# Patient Record
Sex: FEMALE | Race: WHITE | NOT HISPANIC OR LATINO | Employment: OTHER
[De-identification: names, ages, dates, MRNs, and addresses within clinical notes are randomized per-mention and may not be internally consistent; named-entity substitution may affect disease eponyms.]

---

## 2019-11-06 ENCOUNTER — HEALTH MAINTENANCE LETTER (OUTPATIENT)
Age: 74
End: 2019-11-06

## 2020-02-16 ENCOUNTER — HEALTH MAINTENANCE LETTER (OUTPATIENT)
Age: 75
End: 2020-02-16

## 2020-11-29 ENCOUNTER — HEALTH MAINTENANCE LETTER (OUTPATIENT)
Age: 75
End: 2020-11-29

## 2021-04-10 ENCOUNTER — HEALTH MAINTENANCE LETTER (OUTPATIENT)
Age: 76
End: 2021-04-10

## 2021-05-28 ENCOUNTER — RECORDS - HEALTHEAST (OUTPATIENT)
Dept: ADMINISTRATIVE | Facility: CLINIC | Age: 76
End: 2021-05-28

## 2021-07-25 ENCOUNTER — HEALTH MAINTENANCE LETTER (OUTPATIENT)
Age: 76
End: 2021-07-25

## 2021-09-19 ENCOUNTER — HEALTH MAINTENANCE LETTER (OUTPATIENT)
Age: 76
End: 2021-09-19

## 2021-11-14 ENCOUNTER — HEALTH MAINTENANCE LETTER (OUTPATIENT)
Age: 76
End: 2021-11-14

## 2022-03-06 ENCOUNTER — HEALTH MAINTENANCE LETTER (OUTPATIENT)
Age: 77
End: 2022-03-06

## 2022-05-01 ENCOUNTER — HEALTH MAINTENANCE LETTER (OUTPATIENT)
Age: 77
End: 2022-05-01

## 2022-06-08 ENCOUNTER — LAB REQUISITION (OUTPATIENT)
Dept: LAB | Facility: CLINIC | Age: 77
End: 2022-06-08
Payer: COMMERCIAL

## 2022-06-08 DIAGNOSIS — I48.91 UNSPECIFIED ATRIAL FIBRILLATION (H): ICD-10-CM

## 2022-06-09 LAB — INR PPP: 1.87 (ref 0.85–1.15)

## 2022-06-09 PROCEDURE — 85610 PROTHROMBIN TIME: CPT | Performed by: FAMILY MEDICINE

## 2022-06-09 PROCEDURE — P9604 ONE-WAY ALLOW PRORATED TRIP: HCPCS | Performed by: FAMILY MEDICINE

## 2022-06-09 PROCEDURE — 36415 COLL VENOUS BLD VENIPUNCTURE: CPT | Performed by: FAMILY MEDICINE

## 2022-06-11 ENCOUNTER — LAB REQUISITION (OUTPATIENT)
Dept: LAB | Facility: CLINIC | Age: 77
End: 2022-06-11

## 2022-06-11 DIAGNOSIS — I48.91 UNSPECIFIED ATRIAL FIBRILLATION (H): ICD-10-CM

## 2022-06-14 LAB — INR PPP: 1.93 (ref 0.85–1.15)

## 2022-06-14 PROCEDURE — 36415 COLL VENOUS BLD VENIPUNCTURE: CPT | Performed by: FAMILY MEDICINE

## 2022-06-14 PROCEDURE — 85610 PROTHROMBIN TIME: CPT | Performed by: FAMILY MEDICINE

## 2022-06-14 PROCEDURE — P9603 ONE-WAY ALLOW PRORATED MILES: HCPCS | Performed by: FAMILY MEDICINE

## 2022-06-18 ENCOUNTER — LAB REQUISITION (OUTPATIENT)
Dept: LAB | Facility: CLINIC | Age: 77
End: 2022-06-18

## 2022-06-18 DIAGNOSIS — I48.91 UNSPECIFIED ATRIAL FIBRILLATION (H): ICD-10-CM

## 2022-06-21 LAB — INR PPP: 2.84 (ref 0.85–1.15)

## 2022-06-21 PROCEDURE — 36415 COLL VENOUS BLD VENIPUNCTURE: CPT | Performed by: FAMILY MEDICINE

## 2022-06-21 PROCEDURE — P9603 ONE-WAY ALLOW PRORATED MILES: HCPCS | Performed by: FAMILY MEDICINE

## 2022-06-21 PROCEDURE — 85610 PROTHROMBIN TIME: CPT | Performed by: FAMILY MEDICINE

## 2022-06-26 ENCOUNTER — HEALTH MAINTENANCE LETTER (OUTPATIENT)
Age: 77
End: 2022-06-26

## 2022-06-26 ENCOUNTER — LAB REQUISITION (OUTPATIENT)
Dept: LAB | Facility: CLINIC | Age: 77
End: 2022-06-26

## 2022-06-26 DIAGNOSIS — I48.91 UNSPECIFIED ATRIAL FIBRILLATION (H): ICD-10-CM

## 2022-06-26 DIAGNOSIS — I10 ESSENTIAL (PRIMARY) HYPERTENSION: ICD-10-CM

## 2022-06-28 LAB
ANION GAP SERPL CALCULATED.3IONS-SCNC: 13 MMOL/L (ref 5–18)
BUN SERPL-MCNC: 42 MG/DL (ref 8–28)
CALCIUM SERPL-MCNC: 10.9 MG/DL (ref 8.5–10.5)
CHLORIDE BLD-SCNC: 97 MMOL/L (ref 98–107)
CO2 SERPL-SCNC: 29 MMOL/L (ref 22–31)
CREAT SERPL-MCNC: 0.97 MG/DL (ref 0.6–1.1)
GFR SERPL CREATININE-BSD FRML MDRD: 60 ML/MIN/1.73M2
GLUCOSE BLD-MCNC: 73 MG/DL (ref 70–125)
INR PPP: 2.4 (ref 0.85–1.15)
POTASSIUM BLD-SCNC: 4.3 MMOL/L (ref 3.5–5)
SODIUM SERPL-SCNC: 139 MMOL/L (ref 136–145)

## 2022-06-28 PROCEDURE — 80048 BASIC METABOLIC PNL TOTAL CA: CPT | Performed by: FAMILY MEDICINE

## 2022-06-28 PROCEDURE — 36415 COLL VENOUS BLD VENIPUNCTURE: CPT | Performed by: FAMILY MEDICINE

## 2022-06-28 PROCEDURE — 85610 PROTHROMBIN TIME: CPT | Performed by: FAMILY MEDICINE

## 2022-06-28 PROCEDURE — P9603 ONE-WAY ALLOW PRORATED MILES: HCPCS | Performed by: FAMILY MEDICINE

## 2022-06-30 ENCOUNTER — LAB REQUISITION (OUTPATIENT)
Dept: LAB | Facility: CLINIC | Age: 77
End: 2022-06-30

## 2022-06-30 DIAGNOSIS — I48.91 UNSPECIFIED ATRIAL FIBRILLATION (H): ICD-10-CM

## 2022-07-01 LAB — INR PPP: 1.65 (ref 0.85–1.15)

## 2022-07-01 PROCEDURE — P9603 ONE-WAY ALLOW PRORATED MILES: HCPCS | Performed by: FAMILY MEDICINE

## 2022-07-01 PROCEDURE — 36415 COLL VENOUS BLD VENIPUNCTURE: CPT | Performed by: FAMILY MEDICINE

## 2022-07-01 PROCEDURE — 85610 PROTHROMBIN TIME: CPT | Performed by: FAMILY MEDICINE

## 2022-11-09 ENCOUNTER — LAB REQUISITION (OUTPATIENT)
Dept: LAB | Facility: CLINIC | Age: 77
End: 2022-11-09

## 2022-11-09 DIAGNOSIS — R76.12 NONSPECIFIC REACTION TO CELL MEDIATED IMMUNITY MEASUREMENT OF GAMMA INTERFERON ANTIGEN RESPONSE WITHOUT ACTIVE TUBERCULOSIS: ICD-10-CM

## 2022-11-09 RX ORDER — TORSEMIDE 10 MG/1
15 TABLET ORAL 2 TIMES DAILY
Status: ON HOLD | COMMUNITY
End: 2023-10-20

## 2022-11-09 RX ORDER — SIMVASTATIN 20 MG
20 TABLET ORAL AT BEDTIME
Status: ON HOLD | COMMUNITY
End: 2023-10-20

## 2022-11-09 RX ORDER — OXYBUTYNIN CHLORIDE 5 MG/1
5 TABLET ORAL 2 TIMES DAILY PRN
Status: ON HOLD | COMMUNITY
End: 2023-05-31

## 2022-11-09 RX ORDER — METFORMIN HCL 500 MG
2000 TABLET, EXTENDED RELEASE 24 HR ORAL
Status: ON HOLD | COMMUNITY
End: 2023-11-15

## 2022-11-09 RX ORDER — CIPROFLOXACIN 500 MG/1
500 TABLET, FILM COATED ORAL 2 TIMES DAILY
COMMUNITY
End: 2022-11-21

## 2022-11-09 RX ORDER — LISINOPRIL 5 MG/1
5 TABLET ORAL DAILY
Status: ON HOLD | COMMUNITY
End: 2023-10-20

## 2022-11-09 RX ORDER — PREGABALIN 50 MG/1
50 CAPSULE ORAL 3 TIMES DAILY PRN
Status: ON HOLD | COMMUNITY
End: 2023-05-31

## 2022-11-09 NOTE — PROGRESS NOTES
[place] [facility]  PRIMARY CARE PROVIDER AND CLINIC:  Ish Brown MD, [facility] [address], operated by [facility] [address]  Chief Complaint   Patient presents with     Hospital F/U     [facility] Medical Record Number:  [id number]  Place of Service where encounter took place:  [facility] [place] [facility] - [facility] ([facility]) [[id number]]    Linda Loredo  is a 77 year old  (1945), admitted to the above facility from  [facility]. Hospital stay 10/31/22 through 11/9/22..  Admitted to this facility for  rehab, medical management and nursing care.    HPI:    HPI information obtained from: facility chart records, facility staff, patient report and [facility] Epic chart review.   Brief Summary of Hospital Course:   * Pt with PMH notable for T2D on Insulin Pump, Diabetic Neuropathy ( on methadone and Lyrica), diabetic nephropathy, BLE venous statis,    * presented to Ed with non healing Diabetic chronic left heel ulcer, MRI concerning for gas gangrene, s/p I&D and washout x2. Wound vac applied. Dismissed on Cipro and Augmentin unitl 11/16.   * * Evaluated by PMR and felt to benefit from TCU placement.   * Due to TCU regulations, pump put on hold, and started on Lantus 20 U 0- 15 U plus SSI.  Metformin continued.       Today:  - - Resident seen and examined, chart reviewed and discussed with the nursing staff.   - reports feeling fine, denies pain. reports sleep and appetite are fine. Has a follow up appointment with the surgeon.denies fever or chills.   - reports sugar is good and under controlled.      - DNP and RN have no concern today.     -------------------------------  CODE STATUS/ADVANCE DIRECTIVES DISCUSSION:   CPR/Full code    Patient's living condition: lives alone  ALLERGIES: Morphine [fumaric acid], Prednisone, Pregabalin, and Seasonal allergies  PAST MEDICAL HISTORY:  has a past medical history of Depressive disorder, not elsewhere classified, Herpes zoster without mention of  complication, Hypercalcemia (2/24/2007), Mild intermittent asthma, Other urinary incontinence, Type II or unspecified type diabetes mellitus with renal manifestations, uncontrolled(250.42) (H), Type II or unspecified type diabetes mellitus without mention of complication, not stated as uncontrolled, and Unspecified essential hypertension.  PAST SURGICAL HISTORY:   has a past surgical history that includes Cholecystectomy; ligation of fallopian tube; Open reduction internal fixation ankle (10/13/11); and pinning of left 2nd toe.  FAMILY HISTORY: family history includes Cancer in her maternal grandmother and paternal grandmother; Diabetes in her sister; Heart Disease in her father; Hypertension in her mother and sister; Psychotic Disorder in her sister; Respiratory in her sister.  SOCIAL HISTORY:   reports that she has never smoked. She does not have any smokeless tobacco history on file. She reports that she does not drink alcohol and does not use drugs.    Post Discharge Medication Reconciliation Status: discharge medications reconciled and changed, per note/orders  Current Outpatient Medications   Medication Sig Dispense Refill     amoxicillin-clavulanate (AUGMENTIN) 875-125 MG tablet Take 1 tablet by mouth 2 times daily       ciprofloxacin (CIPRO) 500 MG tablet Take 500 mg by mouth 2 times daily       INSULIN PUMP ACCESSORIES MISC as directed 1 one year     lisinopril (ZESTRIL) 10 MG tablet Take 10 mg by mouth daily       metFORMIN (GLUCOPHAGE XR) 500 MG 24 hr tablet Take 2,000 mg by mouth daily (with dinner)       METHADONE HCL 5 MG OR TABS 5mg PO 6 A.M.; 5mg PO 11 A.M.; 10mg PO at 3 p.m.; 5mg PO 8:00 p.m.       ONE TOUCH TEST STRIPS TEST   VI use as directed 100 prn     oxybutynin (DITROPAN) 5 MG tablet Take 5 mg by mouth 2 times daily as needed for bladder spasms       pregabalin (LYRICA) 50 MG capsule Take 50 mg by mouth daily as needed       simvastatin (ZOCOR) 20 MG tablet Take 20 mg by mouth At Bedtime    "    torsemide (DEMADEX) 20 MG tablet Take 40 mg by mouth 2 times daily       Warfarin Therapy Reminder Take 1 each by mouth See Admin Instructions See facility orders for current dose. Next INR 11/10/22       HUMALOG 100 UNIT/ML SC SOLN basal rate 5.0       ROS:  10 point ROS of systems including Constitutional, Eyes, Respiratory, Cardiovascular, Gastroenterology, Genitourinary, Integumentary, Musculoskeletal, Psychiatric were all negative except for pertinent positives noted in my HPI.    Vitals:  /67   Pulse 89   Temp 98  F (36.7  C)   Resp 20   Ht 1.676 m (5' 6\")   Wt 117.9 kg (260 lb)   SpO2 95%   BMI 41.97 kg/m    Exam:  GENERAL APPEARANCE:  in no distress, cooperative  ENT:  Mouth and posterior oropharynx normal, moist mucous membranes, oral mucosa moist, no lesion noted.   EYES:  EOMI, Pupil rounded and equal.  RESP:  lungs clear to auscultation   CV:  S1S2 audible, regular HR, no murmur appreciated.   ABDOMEN:  soft, NT/ND, BS audible. no mass appreciated on palpation.   M/S:   Right leg wrapped.   SKIN:  Wound over plantar surface of left foot. No draining or odor. Some granulation tissue.   NEURO:   No NFD appreciated on observation.   PSYCH: affect and mood normal      Lab/Diagnostic data: Reviewed in the chart and EHR.        ASSESSMENT/PLAN:  --------------------------------    * Pt with PMH notable for T2D on Insulin Pump, Diabetic Neuropathy ( on methadone and Lyrica), diabetic nephropathy, BLE venous statis,  Atrial fibrillation (on coumadin)    * presented to Ed with non healing Diabetic chronic left heel ulcer, MRI concerning for gas gangrene, s/p I&D and washout x2. Wound vac applied. Tissue cx grew mixed anaerobic. Dismissed on Cipro and Augmentin unitl 11/16.     * Evaluated by PMR and felt to benefit from TCU placement.     * Due to TCU regulations, pump put on hold, and started on Lantus 20 U 0- 15 U plus SSI.  Metformin continued.       -started rehab program, making a progress, " continue until desired goal is achieved.   - wound care. Followed by Podiatry team and ID team.   - analgesia optimal  - monitor BGs closely.   - continue torsemide for venous statis.     - Atrial fibrillation,, on coumadin (H): INR 1.9 (11/9).  On 2.5 mg (MWF) and  5 mg AOD.  Today INR is 1.9. continue current regimen, and repeat on Monday.       * Morbid Obesity (H): Body mass index is 41.97 kg/m . Cachil DeHe to reduce cares.         Electronically signed by:  Marely Raygoza MD

## 2022-11-10 ENCOUNTER — TRANSITIONAL CARE UNIT VISIT (OUTPATIENT)
Dept: GERIATRICS | Facility: CLINIC | Age: 77
End: 2022-11-10
Payer: COMMERCIAL

## 2022-11-10 VITALS
DIASTOLIC BLOOD PRESSURE: 67 MMHG | RESPIRATION RATE: 20 BRPM | WEIGHT: 260 LBS | BODY MASS INDEX: 41.78 KG/M2 | TEMPERATURE: 98 F | HEART RATE: 89 BPM | HEIGHT: 66 IN | OXYGEN SATURATION: 95 % | SYSTOLIC BLOOD PRESSURE: 103 MMHG

## 2022-11-10 DIAGNOSIS — L97.919 VENOUS STASIS ULCERS OF BOTH LOWER EXTREMITIES (H): ICD-10-CM

## 2022-11-10 DIAGNOSIS — I48.0 PAROXYSMAL ATRIAL FIBRILLATION (H): ICD-10-CM

## 2022-11-10 DIAGNOSIS — L97.425 DIABETIC ULCER OF LEFT HEEL ASSOCIATED WITH TYPE 2 DIABETES MELLITUS, WITH MUSCLE INVOLVEMENT WITHOUT EVIDENCE OF NECROSIS (H): ICD-10-CM

## 2022-11-10 DIAGNOSIS — L97.929 VENOUS STASIS ULCERS OF BOTH LOWER EXTREMITIES (H): ICD-10-CM

## 2022-11-10 DIAGNOSIS — E66.01 OBESITY, CLASS III, BMI 40-49.9 (MORBID OBESITY) (H): ICD-10-CM

## 2022-11-10 DIAGNOSIS — I83.029 VENOUS STASIS ULCERS OF BOTH LOWER EXTREMITIES (H): ICD-10-CM

## 2022-11-10 DIAGNOSIS — E11.621 DIABETIC ULCER OF LEFT HEEL ASSOCIATED WITH TYPE 2 DIABETES MELLITUS, WITH MUSCLE INVOLVEMENT WITHOUT EVIDENCE OF NECROSIS (H): ICD-10-CM

## 2022-11-10 DIAGNOSIS — I83.019 VENOUS STASIS ULCERS OF BOTH LOWER EXTREMITIES (H): ICD-10-CM

## 2022-11-10 DIAGNOSIS — L02.612 ABSCESS OF LEFT FOOT: Primary | ICD-10-CM

## 2022-11-10 PROCEDURE — 86481 TB AG RESPONSE T-CELL SUSP: CPT | Performed by: NURSE PRACTITIONER

## 2022-11-10 PROCEDURE — 36415 COLL VENOUS BLD VENIPUNCTURE: CPT | Performed by: NURSE PRACTITIONER

## 2022-11-10 PROCEDURE — P9603 ONE-WAY ALLOW PRORATED MILES: HCPCS | Performed by: NURSE PRACTITIONER

## 2022-11-10 PROCEDURE — 99305 1ST NF CARE MODERATE MDM 35: CPT | Performed by: FAMILY MEDICINE

## 2022-11-10 NOTE — LETTER
11/10/2022        RE: Linda Loredo  [address] [id number]        [facility]  PRIMARY CARE PROVIDER AND CLINIC:  Ish Brown MD, [facility] [address] [address] [facility] [address]  Chief Complaint   Patient presents with     Hospital F/U     [facility] Medical Record Number:  [id number]  Place of Service where encounter took place[facility]  [place] [facility] ([facility]) [[id number]]    Linda Loredo  is a 77 year old  (1945), admitted to the above facility from  [facility]. Hospital stay 10/31/22 through 11/9/22..  Admitted to this facility for  rehab, medical management and nursing care.    HPI:    HPI information obtained from: facility chart records, facility staff, patient report and [facility] chart review.   Brief Summary of Hospital Course:   * Pt with PMH notable for T2D on Insulin Pump, Diabetic Neuropathy ( on methadone and Lyrica), diabetic nephropathy, BLE venous statis,    * presented to Ed with non healing Diabetic chronic left heel ulcer, MRI concerning for gas gangrene, s/p I&D and washout x2. Wound vac applied. Dismissed on Cipro and Augmentin unitl 11/16.   * * Evaluated by PMR and felt to benefit from TCU placement.   * Due to TCU regulations, pump put on hold, and started on Lantus 20 U 0- 15 U plus SSI.  Metformin continued.       Today:  - - Resident seen and examined, chart reviewed and discussed with the nursing staff.   - reports feeling fine, denies pain. reports sleep and appetite are fine. Has a follow up appointment with the surgeon.denies fever or chills.   - reports sugar is good and under controlled.      - DNP and RN have no concern today.     -------------------------------  CODE STATUS/ADVANCE DIRECTIVES DISCUSSION:   CPR/Full code    Patient's living condition: lives alone  ALLERGIES: Morphine [fumaric acid], Prednisone, Pregabalin, and Seasonal allergies  PAST MEDICAL HISTORY:  has a  past medical history of Depressive disorder, not elsewhere classified, Herpes zoster without mention of complication, Hypercalcemia (2/24/2007), Mild intermittent asthma, Other urinary incontinence, Type II or unspecified type diabetes mellitus with renal manifestations, uncontrolled(250.42) (H), Type II or unspecified type diabetes mellitus without mention of complication, not stated as uncontrolled, and Unspecified essential hypertension.  PAST SURGICAL HISTORY:   has a past surgical history that includes Cholecystectomy; ligation of fallopian tube; Open reduction internal fixation ankle (10/13/11); and pinning of left 2nd toe.  FAMILY HISTORY: family history includes Cancer in her maternal grandmother and paternal grandmother; Diabetes in her sister; Heart Disease in her father; Hypertension in her mother and sister; Psychotic Disorder in her sister; Respiratory in her sister.  SOCIAL HISTORY:   reports that she has never smoked. She does not have any smokeless tobacco history on file. She reports that she does not drink alcohol and does not use drugs.    Post Discharge Medication Reconciliation Status: discharge medications reconciled and changed, per note/orders  Current Outpatient Medications   Medication Sig Dispense Refill     amoxicillin-clavulanate (AUGMENTIN) 875-125 MG tablet Take 1 tablet by mouth 2 times daily       ciprofloxacin (CIPRO) 500 MG tablet Take 500 mg by mouth 2 times daily       INSULIN PUMP ACCESSORIES MISC as directed 1 one year     lisinopril (ZESTRIL) 10 MG tablet Take 10 mg by mouth daily       metFORMIN (GLUCOPHAGE XR) 500 MG 24 hr tablet Take 2,000 mg by mouth daily (with dinner)       METHADONE HCL 5 MG OR TABS 5mg PO 6 A.M.; 5mg PO 11 A.M.; 10mg PO at 3 p.m.; 5mg PO 8:00 p.m.       ONE TOUCH TEST STRIPS TEST   VI use as directed 100 prn     oxybutynin (DITROPAN) 5 MG tablet Take 5 mg by mouth 2 times daily as needed for bladder spasms       pregabalin (LYRICA) 50 MG capsule Take  "50 mg by mouth daily as needed       simvastatin (ZOCOR) 20 MG tablet Take 20 mg by mouth At Bedtime       torsemide (DEMADEX) 20 MG tablet Take 40 mg by mouth 2 times daily       Warfarin Therapy Reminder Take 1 each by mouth See Admin Instructions See facility orders for current dose. Next INR 11/10/22       HUMALOG 100 UNIT/ML SC SOLN basal rate 5.0       ROS:  10 point ROS of systems including Constitutional, Eyes, Respiratory, Cardiovascular, Gastroenterology, Genitourinary, Integumentary, Musculoskeletal, Psychiatric were all negative except for pertinent positives noted in my HPI.    Vitals:  /67   Pulse 89   Temp 98  F (36.7  C)   Resp 20   Ht 1.676 m (5' 6\")   Wt 117.9 kg (260 lb)   SpO2 95%   BMI 41.97 kg/m    Exam:  GENERAL APPEARANCE:  in no distress, cooperative  ENT:  Mouth and posterior oropharynx normal, moist mucous membranes, oral mucosa moist, no lesion noted.   EYES:  EOMI, Pupil rounded and equal.  RESP:  lungs clear to auscultation   CV:  S1S2 audible, regular HR, no murmur appreciated.   ABDOMEN:  soft, NT/ND, BS audible. no mass appreciated on palpation.   M/S:   Right leg wrapped.   SKIN:  Wound over plantar surface of left foot. No draining or odor. Some granulation tissue.   NEURO:   No NFD appreciated on observation.   PSYCH: affect and mood normal      Lab/Diagnostic data: Reviewed in the chart and EHR.        ASSESSMENT/PLAN:  --------------------------------    * Pt with PMH notable for T2D on Insulin Pump, Diabetic Neuropathy ( on methadone and Lyrica), diabetic nephropathy, BLE venous statis,  Atrial fibrillation (on coumadin)    * presented to Ed with non healing Diabetic chronic left heel ulcer, MRI concerning for gas gangrene, s/p I&D and washout x2. Wound vac applied. Tissue cx grew mixed anaerobic. Dismissed on Cipro and Augmentin unitl 11/16.     * Evaluated by PMR and felt to benefit from TCU placement.     * Due to TCU regulations, pump put on hold, and started " on Lantus 20 U 0- 15 U plus SSI.  Metformin continued.       -started rehab program, making a progress, continue until desired goal is achieved.   - wound care. Followed by Podiatry team and ID team.   - analgesia optimal  - monitor BGs closely.   - continue torsemide for venous statis.     - Atrial fibrillation,, on coumadin (H): INR 1.9 (11/9).  On 2.5 mg (MWF) and  5 mg AOD.  Today INR is 1.9. continue current regimen, and repeat on Monday.       * Morbid Obesity (H): Body mass index is 41.97 kg/m . Torres Martinez to reduce cares.         Electronically signed by:  Marely Raygoza MD         Sincerely,        Marely Raygoza MD

## 2022-11-12 LAB
GAMMA INTERFERON BACKGROUND BLD IA-ACNC: 0.01 IU/ML
M TB IFN-G BLD-IMP: ABNORMAL
M TB IFN-G CD4+ BCKGRND COR BLD-ACNC: 0.22 IU/ML
MITOGEN IGNF BCKGRD COR BLD-ACNC: 0 IU/ML
MITOGEN IGNF BCKGRD COR BLD-ACNC: 0.03 IU/ML
QUANTIFERON MITOGEN: 0.23 IU/ML
QUANTIFERON NIL TUBE: 0.01 IU/ML
QUANTIFERON TB1 TUBE: 0.04 IU/ML
QUANTIFERON TB2 TUBE: 0.01

## 2022-11-13 PROBLEM — R01.1 SYSTOLIC MURMUR: Status: ACTIVE | Noted: 2019-05-29

## 2022-11-13 PROBLEM — E55.9 VITAMIN D DEFICIENCY: Status: ACTIVE | Noted: 2022-09-08

## 2022-11-13 PROBLEM — I83.029 VENOUS STASIS ULCERS OF BOTH LOWER EXTREMITIES (H): Status: ACTIVE | Noted: 2022-01-13

## 2022-11-13 PROBLEM — E11.621: Status: ACTIVE | Noted: 2022-05-17

## 2022-11-13 PROBLEM — L02.612 ABSCESS OF LEFT FOOT: Status: ACTIVE | Noted: 2022-10-31

## 2022-11-13 PROBLEM — K42.9 UMBILICAL HERNIA WITHOUT OBSTRUCTION AND WITHOUT GANGRENE: Status: ACTIVE | Noted: 2018-12-13

## 2022-11-13 PROBLEM — L97.425: Status: ACTIVE | Noted: 2022-05-17

## 2022-11-13 PROBLEM — L97.919 VENOUS STASIS ULCERS OF BOTH LOWER EXTREMITIES (H): Status: ACTIVE | Noted: 2022-01-13

## 2022-11-13 PROBLEM — I83.019 VENOUS STASIS ULCERS OF BOTH LOWER EXTREMITIES (H): Status: ACTIVE | Noted: 2022-01-13

## 2022-11-13 PROBLEM — L97.929 VENOUS STASIS ULCERS OF BOTH LOWER EXTREMITIES (H): Status: ACTIVE | Noted: 2022-01-13

## 2022-11-13 NOTE — PROGRESS NOTES
"[facility] GERIATRICS  ACUTE/EPISODIC VISIT    [facility] Medical Record Number: [id number]  Place of Service where encounter took place[facility] [place] [facility] ([facility]) [[id number]]    Chief Complaint   Patient presents with     RECHECK     HPI:    Linda Loredo is a 77 year old (1945), who is being seen today for an episodic care visit. HPI information obtained from: facility chart records, facility staff, patient report, [facility] chart review and Care Everywhere [facility] chart review.    Today's concern is: Recently hospitalized with left foot ulcer, S/p I&D. Wound vac in place, changed today. Some area of maceration on wound, staff feel drape may not have been properly applied. She denies pain to foot, just wants it to heal. She has been self managing her insulin pump, says her sugars have been \"good.\" Appetite good, is having bowel movements. Denies any SOB or chest pain. Has been afebrile. SLUMS 18/30.   ALLERGIES:   Allergies   Allergen Reactions     Prednisone Nausea and Vomiting     Morphine Nausea and Vomiting     Morphine [Fumaric Acid] Nausea and Vomiting     Pregabalin Unknown     Seasonal Allergies Difficulty breathing      MEDICATIONS:  Post Discharge Medication Reconciliation Status: discharge medications reconciled and changed, per note/orders.     Current Outpatient Medications   Medication Sig Dispense Refill     amoxicillin-clavulanate (AUGMENTIN) 875-125 MG tablet Take 1 tablet by mouth 2 times daily       ciprofloxacin (CIPRO) 500 MG tablet Take 500 mg by mouth 2 times daily       HUMALOG 100 UNIT/ML SC SOLN basal rate 5.0       INSULIN PUMP ACCESSORIES MISC as directed 1 one year     lisinopril (ZESTRIL) 10 MG tablet Take 10 mg by mouth daily       metFORMIN (GLUCOPHAGE XR) 500 MG 24 hr tablet Take 2,000 mg by mouth daily (with dinner)       METHADONE HCL 5 MG OR TABS 5mg PO 6 A.M.; 5mg PO 11 A.M.; 10mg PO at 3 p.m.; 5mg PO 8:00 p.m.       ONE TOUCH TEST " "STRIPS TEST   VI use as directed 100 prn     oxybutynin (DITROPAN) 5 MG tablet Take 5 mg by mouth 2 times daily as needed for bladder spasms       pregabalin (LYRICA) 50 MG capsule Take 50 mg by mouth daily as needed       simvastatin (ZOCOR) 20 MG tablet Take 20 mg by mouth At Bedtime       torsemide (DEMADEX) 20 MG tablet Take 40 mg by mouth 2 times daily       Warfarin Therapy Reminder Take 1 each by mouth See Admin Instructions See facility orders for current dose. Next INR 11/10/22       Medications reviewed:  Medications reconciled to facility chart and changes were made to reflect current medications as identified as above med list. Below are the changes that were made:   Medications stopped since last EPIC medication reconciliation:   There are no discontinued medications.    Medications started since last [facility] medication reconciliation:  No orders of the defined types were placed in this encounter.        REVIEW OF SYSTEMS:  4 point ROS neg other than the symptoms noted above in the HPI.    PHYSICAL EXAM:  /77   Pulse 78   Temp (!) 96  F (35.6  C)   Resp 20   Ht 1.676 m (5' 6\")   Wt 111.6 kg (246 lb)   SpO2 99%   BMI 39.71 kg/m    Physical Exam  Constitutional:       Appearance: Normal appearance. She is obese.   Cardiovascular:      Rate and Rhythm: Normal rate and regular rhythm.      Heart sounds: Normal heart sounds.   Pulmonary:      Effort: Pulmonary effort is normal.      Breath sounds: Normal breath sounds.   Abdominal:      General: Bowel sounds are normal.      Palpations: Abdomen is soft.   Musculoskeletal:      Comments: Non ambulatory, no gross joint deformities   Skin:     Comments: See phot, dry peedling skin to left foot   Neurological:      General: No focal deficit present.      Mental Status: She is alert and oriented to person, place, and time.   Psychiatric:         Mood and Affect: Mood normal.         Behavior: Behavior normal.         Thought Content: Thought content " "normal.         Judgment: Judgment normal.               ASSESSMENT / PLAN:  Abscess of left foot  Diabetic ulcer of left heel associated with type 2 diabetes mellitus, with muscle involvement without evidence of necrosis (H)  S/p I&D. Pain controlled. Dicussed with patient and nursing  - continue wound vac cares  - complete cipro and Augmentin (11/16)  - follow-up with podiatry as scheduled  - NWB to LLE  -analgesia with methadone 5mg QID, Lyrica 50mg daily PRN,   - monitor and adjust     Type 2 diabetes mellitus with foot ulcer, with long-term current use of insulin (H)  A1C 7.6%, Report \"good\" BG in TCU, she self checks, has not been reporting to nursing  - asked nursing staff to record BG   - Insulin pump: patient self managers    Paroxysmal atrial fibrillation (H)  Anticoagulated on Coumadin  HR controlled 60-80, INR 3.3 today is on antibitoics  - Hold coumadin today, give coumadin 2.5mg tomorrow  - next INR on 11/16      Chronic obstructive pulmonary disease, unspecified COPD type (H)  Appear compensated  - monitor     Venous stasis of lower extremity  Chronic, has been getting lymphwraps  - lymph therapy oer PT/OT  - torsemide 20mg daily   - monitor and adjust     Physical deconditioning  Limited participation with therapy. Mobility limited due to weight bearing status, Was advanced from julio to slide board transfer today. Goal is to discharge at wheelchair level  - PT/OT  -  to assist with discharge planning.     Orders:  1. INR 3.3, hold coumadin today, give 2.5mg on 11/15. Recheck INR on 11/16.     Electronically signed by:  MOE Laird CNP  "

## 2022-11-14 ENCOUNTER — TRANSITIONAL CARE UNIT VISIT (OUTPATIENT)
Dept: GERIATRICS | Facility: CLINIC | Age: 77
End: 2022-11-14
Payer: COMMERCIAL

## 2022-11-14 VITALS
HEIGHT: 66 IN | WEIGHT: 246 LBS | OXYGEN SATURATION: 99 % | BODY MASS INDEX: 39.53 KG/M2 | RESPIRATION RATE: 20 BRPM | SYSTOLIC BLOOD PRESSURE: 118 MMHG | HEART RATE: 78 BPM | TEMPERATURE: 96 F | DIASTOLIC BLOOD PRESSURE: 77 MMHG

## 2022-11-14 DIAGNOSIS — I87.8 VENOUS STASIS OF LOWER EXTREMITY: ICD-10-CM

## 2022-11-14 DIAGNOSIS — L97.425 DIABETIC ULCER OF LEFT HEEL ASSOCIATED WITH TYPE 2 DIABETES MELLITUS, WITH MUSCLE INVOLVEMENT WITHOUT EVIDENCE OF NECROSIS (H): ICD-10-CM

## 2022-11-14 DIAGNOSIS — L97.509 TYPE 2 DIABETES MELLITUS WITH FOOT ULCER, WITH LONG-TERM CURRENT USE OF INSULIN (H): ICD-10-CM

## 2022-11-14 DIAGNOSIS — E11.621 TYPE 2 DIABETES MELLITUS WITH FOOT ULCER, WITH LONG-TERM CURRENT USE OF INSULIN (H): ICD-10-CM

## 2022-11-14 DIAGNOSIS — L02.612 ABSCESS OF LEFT FOOT: Primary | ICD-10-CM

## 2022-11-14 DIAGNOSIS — Z79.4 TYPE 2 DIABETES MELLITUS WITH FOOT ULCER, WITH LONG-TERM CURRENT USE OF INSULIN (H): ICD-10-CM

## 2022-11-14 DIAGNOSIS — R53.81 PHYSICAL DECONDITIONING: ICD-10-CM

## 2022-11-14 DIAGNOSIS — J44.9 CHRONIC OBSTRUCTIVE PULMONARY DISEASE, UNSPECIFIED COPD TYPE (H): ICD-10-CM

## 2022-11-14 DIAGNOSIS — Z79.01 ANTICOAGULATED ON COUMADIN: ICD-10-CM

## 2022-11-14 DIAGNOSIS — E11.621 DIABETIC ULCER OF LEFT HEEL ASSOCIATED WITH TYPE 2 DIABETES MELLITUS, WITH MUSCLE INVOLVEMENT WITHOUT EVIDENCE OF NECROSIS (H): ICD-10-CM

## 2022-11-14 DIAGNOSIS — I48.0 PAROXYSMAL ATRIAL FIBRILLATION (H): ICD-10-CM

## 2022-11-14 PROCEDURE — 99309 SBSQ NF CARE MODERATE MDM 30: CPT | Performed by: NURSE PRACTITIONER

## 2022-11-14 RX ORDER — LIDOCAINE 4 G/G
1 PATCH TOPICAL EVERY 24 HOURS
Status: ON HOLD | COMMUNITY
Start: 2022-11-14 | End: 2023-05-31

## 2022-11-14 NOTE — LETTER
"    11/14/2022        RE: Linda Loredo  [address] [facility] [address]        [facility] GERIATRICS  ACUTE/EPISODIC VISIT    [facility] Medical Record Number: [id number]  Place of Service where encounter took place: [place] [facility] [place] [facility] ([facility]) [[id number]]    Chief Complaint   Patient presents with     RECHECK     HPI:    Linda Loredo is a 77 year old (1945), who is being seen today for an episodic care visit. HPI information obtained from: facility chart records, facility staff, patient report, [facility] chart review and Care Everywhere [facility] chart review.    Today's concern is: Recently hospitalized with left foot ulcer, S/p I&D. Wound vac in place, changed today. Some area of maceration on wound, staff feel drape may not have been properly applied. She denies pain to foot, just wants it to heal. She has been self managing her insulin pump, says her sugars have been \"good.\" Appetite good, is having bowel movements. Denies any SOB or chest pain. Has been afebrile. SLUMS 18/30.   ALLERGIES:   Allergies   Allergen Reactions     Prednisone Nausea and Vomiting     Morphine Nausea and Vomiting     Morphine [Fumaric Acid] Nausea and Vomiting     Pregabalin Unknown     Seasonal Allergies Difficulty breathing      MEDICATIONS:  Post Discharge Medication Reconciliation Status: discharge medications reconciled and changed, per note/orders.     Current Outpatient Medications   Medication Sig Dispense Refill     amoxicillin-clavulanate (AUGMENTIN) 875-125 MG tablet Take 1 tablet by mouth 2 times daily       ciprofloxacin (CIPRO) 500 MG tablet Take 500 mg by mouth 2 times daily       HUMALOG 100 UNIT/ML SC SOLN basal rate 5.0       INSULIN PUMP ACCESSORIES MISC as directed 1 one year     lisinopril (ZESTRIL) 10 MG tablet Take 10 mg by mouth daily       metFORMIN (GLUCOPHAGE XR) 500 MG 24 hr tablet Take 2,000 mg by mouth daily (with dinner)       METHADONE HCL " "5 MG OR TABS 5mg PO 6 A.M.; 5mg PO 11 A.M.; 10mg PO at 3 p.m.; 5mg PO 8:00 p.m.       ONE TOUCH TEST STRIPS TEST   VI use as directed 100 prn     oxybutynin (DITROPAN) 5 MG tablet Take 5 mg by mouth 2 times daily as needed for bladder spasms       pregabalin (LYRICA) 50 MG capsule Take 50 mg by mouth daily as needed       simvastatin (ZOCOR) 20 MG tablet Take 20 mg by mouth At Bedtime       torsemide (DEMADEX) 20 MG tablet Take 40 mg by mouth 2 times daily       Warfarin Therapy Reminder Take 1 each by mouth See Admin Instructions See facility orders for current dose. Next INR 11/10/22       Medications reviewed:  Medications reconciled to facility chart and changes were made to reflect current medications as identified as above med list. Below are the changes that were made:   Medications stopped since last EPIC medication reconciliation:   There are no discontinued medications.    Medications started since last [facility] medication reconciliation:  No orders of the defined types were placed in this encounter.        REVIEW OF SYSTEMS:  4 point ROS neg other than the symptoms noted above in the HPI.    PHYSICAL EXAM:  /77   Pulse 78   Temp (!) 96  F (35.6  C)   Resp 20   Ht 1.676 m (5' 6\")   Wt 111.6 kg (246 lb)   SpO2 99%   BMI 39.71 kg/m    Physical Exam  Constitutional:       Appearance: Normal appearance. She is obese.   Cardiovascular:      Rate and Rhythm: Normal rate and regular rhythm.      Heart sounds: Normal heart sounds.   Pulmonary:      Effort: Pulmonary effort is normal.      Breath sounds: Normal breath sounds.   Abdominal:      General: Bowel sounds are normal.      Palpations: Abdomen is soft.   Musculoskeletal:      Comments: Non ambulatory, no gross joint deformities   Skin:     Comments: See phot, dry peedling skin to left foot   Neurological:      General: No focal deficit present.      Mental Status: She is alert and oriented to person, place, and time.   Psychiatric:         Mood and " "Affect: Mood normal.         Behavior: Behavior normal.         Thought Content: Thought content normal.         Judgment: Judgment normal.               ASSESSMENT / PLAN:  Abscess of left foot  Diabetic ulcer of left heel associated with type 2 diabetes mellitus, with muscle involvement without evidence of necrosis (H)  S/p I&D. Pain controlled. Dicussed with patient and nursing  - continue wound vac cares  - complete cipro and Augmentin (11/16)  - follow-up with podiatry as scheduled  - NWB to LLE  -analgesia with methadone 5mg QID, Lyrica 50mg daily PRN,   - monitor and adjust     Type 2 diabetes mellitus with foot ulcer, with long-term current use of insulin (H)  A1C 7.6%, Report \"good\" BG in TCU, she self checks, has not been reporting to nursing  - asked nursing staff to record BG   - Insulin pump: patient self managers    Paroxysmal atrial fibrillation (H)  Anticoagulated on Coumadin  HR controlled 60-80, INR 3.3 today is on antibitoics  - Hold coumadin today, give coumadin 2.5mg tomorrow  - next INR on 11/16      Chronic obstructive pulmonary disease, unspecified COPD type (H)  Appear compensated  - monitor     Venous stasis of lower extremity  Chronic, has been getting lymphwraps  - lymph therapy oer PT/OT  - torsemide 20mg daily   - monitor and adjust     Physical deconditioning  Limited participation with therapy. Mobility limited due to weight bearing status, Was advanced from julio to slide board transfer today. Goal is to discharge at wheelchair level  - PT/OT  -  to assist with discharge planning.     Orders:  1. INR 3.3, hold coumadin today, give 2.5mg on 11/15. Recheck INR on 11/16.     Electronically signed by:  MOE Laird CNP        Sincerely,        MOE Laird CNP    "

## 2022-11-16 ENCOUNTER — TRANSITIONAL CARE UNIT VISIT (OUTPATIENT)
Dept: GERIATRICS | Facility: CLINIC | Age: 77
End: 2022-11-16
Payer: COMMERCIAL

## 2022-11-16 VITALS
RESPIRATION RATE: 18 BRPM | WEIGHT: 264 LBS | HEART RATE: 90 BPM | HEIGHT: 64 IN | BODY MASS INDEX: 45.07 KG/M2 | TEMPERATURE: 98.1 F | DIASTOLIC BLOOD PRESSURE: 75 MMHG | OXYGEN SATURATION: 99 % | SYSTOLIC BLOOD PRESSURE: 114 MMHG

## 2022-11-16 DIAGNOSIS — Z51.81 ENCOUNTER FOR THERAPEUTIC DRUG MONITORING: ICD-10-CM

## 2022-11-16 DIAGNOSIS — I48.0 PAROXYSMAL ATRIAL FIBRILLATION (H): Primary | ICD-10-CM

## 2022-11-16 DIAGNOSIS — Z79.01 LONG TERM (CURRENT) USE OF ANTICOAGULANTS: ICD-10-CM

## 2022-11-16 PROCEDURE — 2894A VOIDCORRECT: CPT | Performed by: NURSE PRACTITIONER

## 2022-11-16 NOTE — LETTER
"    11/16/2022        RE: Linda Loredo  [address] [address] [facility] [address]        [facility] GERIATRICS  ANTICOAGULATION  [place] Medical Record Number: [id number]  Place of Service Where Encounter Took Place: [facility] ([facility]) [[id number]]    HPI:  Linda Loredo is a 77 year old (1945), who is being seen today for an episodic care visit at the above location. HPI information obtained from: facility chart records, facility staff, patient report and [facility] chart review. Today's concern is INR/Coumadin management for A. Fib    ROS/Subjective:  Bleeding Signs/Symptoms: None  Thromboembolic Signs/Symptoms: None  Medication Changes: Yes: completed antibitoics  Dietary Changes: No  Activity Changes: No  Bacterial/Viral Infection: Yes: foot abscess, completed Cipro and Augmentin today  Missed Coumadin Doses: None  On ASA: No  Other Concerns: No    OBJECTIVE:  /75   Pulse 90   Temp 98.1  F (36.7  C)   Resp 18   Ht 1.626 m (5' 4\")   Wt 119.7 kg (264 lb)   SpO2 99%   BMI 45.32 kg/m    Last INR: 3.3 on 11/14  INR Today: 3.2  Current Dose: held on 11/14, 2.5mg on 11/16      ASSESSMENT:  1. Paroxysmal atrial fibrillation (H)    2. Encounter for therapeutic drug monitoring    3. Long term (current) use of anticoagulants      Supratherapeutic INR for goal of 2-3    PLAN:   New Dose: 2.5mg x 2 days    Next INR: 11/18      Electronically signed by:  MOE Laird CNP         Sincerely,        MOE Laird CNP    "

## 2022-11-16 NOTE — PROGRESS NOTES
"[facility] GERIATRICS  ANTICOAGULATION  [place] Medical Record Number: [id number]  Place of Service Where Encounter Took Place: [facility] ([facility]) [[id number]]    HPI:  Linda Loredo is a 77 year old (1945), who is being seen today for an episodic care visit at the above location. HPI information obtained from: facility chart records, facility staff, patient report and [facility] chart review. Today's concern is INR/Coumadin management for A. Fib    ROS/Subjective:  Bleeding Signs/Symptoms: None  Thromboembolic Signs/Symptoms: None  Medication Changes: Yes: completed antibitoics  Dietary Changes: No  Activity Changes: No  Bacterial/Viral Infection: Yes: foot abscess, completed Cipro and Augmentin today  Missed Coumadin Doses: None  On ASA: No  Other Concerns: No    OBJECTIVE:  /75   Pulse 90   Temp 98.1  F (36.7  C)   Resp 18   Ht 1.626 m (5' 4\")   Wt 119.7 kg (264 lb)   SpO2 99%   BMI 45.32 kg/m    Last INR: 3.3 on 11/14  INR Today: 3.2  Current Dose: held on 11/14, 2.5mg on 11/16      ASSESSMENT:  1. Paroxysmal atrial fibrillation (H)    2. Encounter for therapeutic drug monitoring    3. Long term (current) use of anticoagulants      Supratherapeutic INR for goal of 2-3    PLAN:   New Dose: 2.5mg x 2 days    Next INR: 11/18      Electronically signed by:  MOE Laird CNP   "

## 2022-11-17 NOTE — PROGRESS NOTES
"[facility] GERIATRICS  ANTICOAGULATION  [place] Medical Record Number: [id number]  Place of Service Where Encounter Took Place: [facility] ([facility]) [[id number]]    HPI:  Linda Loredo is a 77 year old (1945), who is being seen today for an episodic care visit at the above location. HPI information obtained from: facility chart records, facility staff and [facility] chart review. Today's concern is INR/Coumadin management for ELIE. Fib    ROS/Subjective:  Bleeding Signs/Symptoms: None  Thromboembolic Signs/Symptoms: None  Medication Changes: No  Dietary Changes: No  Activity Changes: No  Bacterial/Viral Infection: No  Missed Coumadin Doses: None  On ASA: No  Other Concerns: No    OBJECTIVE:  /66   Pulse 77   Temp 97.9  F (36.6  C)   Resp 14   Ht 1.626 m (5' 4\")   Wt 119.7 kg (264 lb)   SpO2 99%   BMI 45.32 kg/m    Last INR: 3.2 on 11/16  INR Today: 3.3  Current Dose: 2.5MG DAILY       ASSESSMENT:  1. Paroxysmal atrial fibrillation (H)    2. Encounter for therapeutic drug monitoring    3. Long term (current) use of anticoagulants      Supratherapeutic INR for goal of 2-3    PLAN:   New Dose: No Change  (2.5mg daily)  Next INR: 11.27      Electronically signed by:  MOE Laird CNP   "

## 2022-11-18 ENCOUNTER — TRANSITIONAL CARE UNIT VISIT (OUTPATIENT)
Dept: GERIATRICS | Facility: CLINIC | Age: 77
End: 2022-11-18
Payer: COMMERCIAL

## 2022-11-18 VITALS
OXYGEN SATURATION: 99 % | HEART RATE: 77 BPM | DIASTOLIC BLOOD PRESSURE: 66 MMHG | RESPIRATION RATE: 14 BRPM | SYSTOLIC BLOOD PRESSURE: 136 MMHG | BODY MASS INDEX: 45.07 KG/M2 | WEIGHT: 264 LBS | TEMPERATURE: 97.9 F | HEIGHT: 64 IN

## 2022-11-18 DIAGNOSIS — I48.0 PAROXYSMAL ATRIAL FIBRILLATION (H): Primary | ICD-10-CM

## 2022-11-18 DIAGNOSIS — Z79.01 LONG TERM (CURRENT) USE OF ANTICOAGULANTS: ICD-10-CM

## 2022-11-18 DIAGNOSIS — Z51.81 ENCOUNTER FOR THERAPEUTIC DRUG MONITORING: ICD-10-CM

## 2022-11-18 PROCEDURE — 99308 SBSQ NF CARE LOW MDM 20: CPT | Performed by: NURSE PRACTITIONER

## 2022-11-18 NOTE — LETTER
"    11/18/2022        RE: Linda Loredo  [address] [address] [facility] [address]        [facility] GERIATRICS  ANTICOAGULATION  [place] Medical Record Number: [id number]  Place of Service Where Encounter Took Place: [facility] [place] [facility] ([facility]) [[id number]]    HPI:  Linda Loredo is a 77 year old (1945), who is being seen today for an episodic care visit at the above location. HPI information obtained from: facility chart records, facility staff and [place] [facility] chart review. Today's concern is INR/Coumadin management for A. Fib    ROS/Subjective:  Bleeding Signs/Symptoms: None  Thromboembolic Signs/Symptoms: None  Medication Changes: No  Dietary Changes: No  Activity Changes: No  Bacterial/Viral Infection: No  Missed Coumadin Doses: None  On ASA: No  Other Concerns: No    OBJECTIVE:  /66   Pulse 77   Temp 97.9  F (36.6  C)   Resp 14   Ht 1.626 m (5' 4\")   Wt 119.7 kg (264 lb)   SpO2 99%   BMI 45.32 kg/m    Last INR: 3.2 on 11/16  INR Today: 3.3  Current Dose: 2.5MG DAILY       ASSESSMENT:  1. Paroxysmal atrial fibrillation (H)    2. Encounter for therapeutic drug monitoring    3. Long term (current) use of anticoagulants      Supratherapeutic INR for goal of 2-3    PLAN:   New Dose: No Change  (2.5mg daily)  Next INR: 11.27      Electronically signed by:  MOE Laird CNP         Sincerely,        MOE Laird CNP    "

## 2022-11-20 NOTE — PROGRESS NOTES
[facility] GERIATRICS  ACUTE/EPISODIC VISIT    [facility] Medical Record Number: [id number]  [facility] [[id number][facility]    Chief Complaint   Patient presents with     RECHECK     HPI:    Linda Loredo is a 77 year old (1945), who is being seen today for an episodic care visit. HPI information obtained from: facility chart records, facility staff, patient report and [facility] chart review.    Today's concern is: Recently hospitalized with left foot ulcer, S/p I&D. Wound vac in place. She reports pain is stable, typically at a 6/10 which is her baseline. She has been able to pivot transfer into her wheelchair was annoyed when nurse attempted to transfer with 2 people as she does not feel she needs that much help anymore. Appetite is good, is having bowel movements. Therapy did come in to assist with transfers, they asked about home care vs outpatient care at discharge. Patient became annoyed, told therapy it was not her business and she would tell her when it was her business.     ALLERGIES:   Allergies   Allergen Reactions     Prednisone Nausea and Vomiting     Morphine Nausea and Vomiting     Morphine [Fumaric Acid] Nausea and Vomiting     Pregabalin Unknown     Seasonal Allergies Difficulty breathing      MEDICATIONS:  Post Discharge Medication Reconciliation Status: discharge medications reconciled and changed, per note/orders.     Current Outpatient Medications   Medication Sig Dispense Refill     HUMALOG 100 UNIT/ML SC SOLN Basal rate 5 units; bolus with 1-5 units TID with meals based on BG       INSULIN PUMP ACCESSORIES MISC as directed 1 one year     Lidocaine (LIDOCARE) 4 % Patch Place 1 patch onto the skin every 24 hours To prevent lidocaine toxicity, patient should be patch free for 12 hrs daily. Right knee       lisinopril (ZESTRIL) 10 MG tablet Take 10 mg by mouth daily       metFORMIN (GLUCOPHAGE XR) 500  "MG 24 hr tablet Take 2,000 mg by mouth daily (with dinner)       METHADONE HCL 5 MG OR TABS 5mg PO 6 A.M.; 5mg PO 11 A.M.; 10mg PO at 3 p.m.; 5mg PO 8:00 p.m.       ONE TOUCH TEST STRIPS TEST   VI use as directed 100 prn     oxybutynin (DITROPAN) 5 MG tablet Take 5 mg by mouth 2 times daily as needed for bladder spasms       pregabalin (LYRICA) 50 MG capsule Take 50 mg by mouth daily as needed       simvastatin (ZOCOR) 20 MG tablet Take 20 mg by mouth At Bedtime       torsemide (DEMADEX) 20 MG tablet Take 40 mg by mouth 2 times daily       Warfarin Therapy Reminder Take 1 each by mouth See Admin Instructions See facility orders for current dose. Next INR 11/10/22       Medications reviewed:  Medications reconciled to facility chart and changes were made to reflect current medications as identified as above med list. Below are the changes that were made:   Medications stopped since last EPIC medication reconciliation:   There are no discontinued medications.    Medications started since last [facility] medication reconciliation:  No orders of the defined types were placed in this encounter.        REVIEW OF SYSTEMS:  4 point ROS neg other than the symptoms noted above in the HPI.  PHYSICAL EXAM:  /83   Pulse 95   Temp 98.2  F (36.8  C)   Resp 18   Ht 1.626 m (5' 4\")   Wt 111.6 kg (246 lb)   SpO2 99%   BMI 42.23 kg/m    Physical Exam  Constitutional:       Appearance: Normal appearance.   Cardiovascular:      Rate and Rhythm: Normal rate and regular rhythm.      Heart sounds: Normal heart sounds.   Pulmonary:      Effort: Pulmonary effort is normal.      Breath sounds: Normal breath sounds.   Abdominal:      General: Bowel sounds are normal.      Palpations: Abdomen is soft.   Musculoskeletal:      Right lower leg: Edema present.      Left lower leg: Edema present.   Skin:     Comments: Wound vac to LLE CDI   Neurological:      Mental Status: She is alert and oriented to person, place, and time.   Psychiatric:  "        Mood and Affect: Mood normal.         Behavior: Behavior normal.         ASSESSMENT / PLAN:  Paroxysmal atrial fibrillation (H)  Anticoagulated on Coumadin  HR controlled 70-90, INR 3.2 today, is off antibiotics  - continue coumadin 2.5mg daily  - not on any rate control medications    Abscess of left foot  Diabetic ulcer of left heel associated with type 2 diabetes mellitus, with muscle involvement without evidence of necrosis (H)  S/p I&D. Pain controlled. Last seen by podiatry on 11/17, completed cipro and Augmentin (11/16)   - continue wound vac cares  - follow-up with podiatry as scheduled  - NWB to LLE  -analgesia with methadone 5mg QID, Lyrica 50mg daily PRN,   - monitor and adjust     Type 2 diabetes mellitus with foot ulcer, with long-term current use of insulin (H)  A1C 7.6%. Has been self managing BG in TCU.   - Insulin pump: patient self managers    Chronic obstructive pulmonary disease, unspecified COPD type (H)  Appear compensated  - monitor     Venous stasis of lower extremity  Chronic BLE edema, had been getting lymphwraps in TCU.   - lymph therapy per PT/OT  - torsemide 20mg daily   - monitor and adjust     Physical deconditioning  Making progress with therapy. Sitting up in wheelchair most of the day. Stand pivot with SBA on visit today  - PT/OT  - will likely need home care on discharge.     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 INR 3.2. Continue coumadin 2.5mg daily, recheck INR in 11/23.     Electronically signed by:  MOE Laird CNP

## 2022-11-21 ENCOUNTER — HEALTH MAINTENANCE LETTER (OUTPATIENT)
Age: 77
End: 2022-11-21

## 2022-11-21 ENCOUNTER — TRANSITIONAL CARE UNIT VISIT (OUTPATIENT)
Dept: GERIATRICS | Facility: CLINIC | Age: 77
End: 2022-11-21
Payer: COMMERCIAL

## 2022-11-21 VITALS
RESPIRATION RATE: 18 BRPM | SYSTOLIC BLOOD PRESSURE: 130 MMHG | BODY MASS INDEX: 42 KG/M2 | HEART RATE: 95 BPM | OXYGEN SATURATION: 99 % | WEIGHT: 246 LBS | HEIGHT: 64 IN | TEMPERATURE: 98.2 F | DIASTOLIC BLOOD PRESSURE: 83 MMHG

## 2022-11-21 DIAGNOSIS — Z79.01 ANTICOAGULATED ON COUMADIN: ICD-10-CM

## 2022-11-21 DIAGNOSIS — E11.621 TYPE 2 DIABETES MELLITUS WITH FOOT ULCER, WITH LONG-TERM CURRENT USE OF INSULIN (H): ICD-10-CM

## 2022-11-21 DIAGNOSIS — R53.81 PHYSICAL DECONDITIONING: ICD-10-CM

## 2022-11-21 DIAGNOSIS — L97.509 TYPE 2 DIABETES MELLITUS WITH FOOT ULCER, WITH LONG-TERM CURRENT USE OF INSULIN (H): ICD-10-CM

## 2022-11-21 DIAGNOSIS — J44.9 CHRONIC OBSTRUCTIVE PULMONARY DISEASE, UNSPECIFIED COPD TYPE (H): ICD-10-CM

## 2022-11-21 DIAGNOSIS — L02.612 ABSCESS OF LEFT FOOT: ICD-10-CM

## 2022-11-21 DIAGNOSIS — I87.8 VENOUS STASIS OF LOWER EXTREMITY: ICD-10-CM

## 2022-11-21 DIAGNOSIS — L97.425 DIABETIC ULCER OF LEFT HEEL ASSOCIATED WITH TYPE 2 DIABETES MELLITUS, WITH MUSCLE INVOLVEMENT WITHOUT EVIDENCE OF NECROSIS (H): ICD-10-CM

## 2022-11-21 DIAGNOSIS — I48.0 PAROXYSMAL ATRIAL FIBRILLATION (H): Primary | ICD-10-CM

## 2022-11-21 DIAGNOSIS — Z79.4 TYPE 2 DIABETES MELLITUS WITH FOOT ULCER, WITH LONG-TERM CURRENT USE OF INSULIN (H): ICD-10-CM

## 2022-11-21 DIAGNOSIS — G89.4 CHRONIC PAIN SYNDROME: ICD-10-CM

## 2022-11-21 DIAGNOSIS — E11.621 DIABETIC ULCER OF LEFT HEEL ASSOCIATED WITH TYPE 2 DIABETES MELLITUS, WITH MUSCLE INVOLVEMENT WITHOUT EVIDENCE OF NECROSIS (H): ICD-10-CM

## 2022-11-21 PROCEDURE — 99309 SBSQ NF CARE MODERATE MDM 30: CPT | Performed by: NURSE PRACTITIONER

## 2022-11-21 NOTE — LETTER
11/21/2022        RE: Linda Loredo  [address] [address] [address]        [facility] GERIATRICS  ACUTE/EPISODIC VISIT    [facility] Medical Record Number: [id number]  Place of Service where encounter took place: [facility] [place] [facility] ([facility]) [[id number]]    Chief Complaint   Patient presents with     RECHECK     HPI:    Linda Loredo is a 77 year old (1945), who is being seen today for an episodic care visit. HPI information obtained from: facility chart records, facility staff, patient report and [facility] chart review.    Today's concern is: Recently hospitalized with left foot ulcer, S/p I&D. Wound vac in place. She reports pain is stable, typically at a 6/10 which is her baseline. She has been able to pivot transfer into her wheelchair was annoyed when nurse attempted to transfer with 2 people as she does not feel she needs that much help anymore. Appetite is good, is having bowel movements. Therapy did come in to assist with transfers, they asked about home care vs outpatient care at discharge. Patient became annoyed, told therapy it was not her business and she would tell her when it was her business.     ALLERGIES:   Allergies   Allergen Reactions     Prednisone Nausea and Vomiting     Morphine Nausea and Vomiting     Morphine [Fumaric Acid] Nausea and Vomiting     Pregabalin Unknown     Seasonal Allergies Difficulty breathing      MEDICATIONS:  Post Discharge Medication Reconciliation Status: discharge medications reconciled and changed, per note/orders.     Current Outpatient Medications   Medication Sig Dispense Refill     HUMALOG 100 UNIT/ML SC SOLN Basal rate 5 units; bolus with 1-5 units TID with meals based on BG       INSULIN PUMP ACCESSORIES MISC as directed 1 one year     Lidocaine (LIDOCARE) 4 % Patch Place 1 patch onto the skin every 24 hours To prevent lidocaine toxicity, patient should be patch free for 12 hrs daily. Right  "knee       lisinopril (ZESTRIL) 10 MG tablet Take 10 mg by mouth daily       metFORMIN (GLUCOPHAGE XR) 500 MG 24 hr tablet Take 2,000 mg by mouth daily (with dinner)       METHADONE HCL 5 MG OR TABS 5mg PO 6 A.M.; 5mg PO 11 A.M.; 10mg PO at 3 p.m.; 5mg PO 8:00 p.m.       ONE TOUCH TEST STRIPS TEST   VI use as directed 100 prn     oxybutynin (DITROPAN) 5 MG tablet Take 5 mg by mouth 2 times daily as needed for bladder spasms       pregabalin (LYRICA) 50 MG capsule Take 50 mg by mouth daily as needed       simvastatin (ZOCOR) 20 MG tablet Take 20 mg by mouth At Bedtime       torsemide (DEMADEX) 20 MG tablet Take 40 mg by mouth 2 times daily       Warfarin Therapy Reminder Take 1 each by mouth See Admin Instructions See facility orders for current dose. Next INR 11/10/22       Medications reviewed:  Medications reconciled to facility chart and changes were made to reflect current medications as identified as above med list. Below are the changes that were made:   Medications stopped since last EPIC medication reconciliation:   There are no discontinued medications.    Medications started since last [facility] medication reconciliation:  No orders of the defined types were placed in this encounter.        REVIEW OF SYSTEMS:  4 point ROS neg other than the symptoms noted above in the HPI.  PHYSICAL EXAM:  /83   Pulse 95   Temp 98.2  F (36.8  C)   Resp 18   Ht 1.626 m (5' 4\")   Wt 111.6 kg (246 lb)   SpO2 99%   BMI 42.23 kg/m    Physical Exam  Constitutional:       Appearance: Normal appearance.   Cardiovascular:      Rate and Rhythm: Normal rate and regular rhythm.      Heart sounds: Normal heart sounds.   Pulmonary:      Effort: Pulmonary effort is normal.      Breath sounds: Normal breath sounds.   Abdominal:      General: Bowel sounds are normal.      Palpations: Abdomen is soft.   Musculoskeletal:      Right lower leg: Edema present.      Left lower leg: Edema present.   Skin:     Comments: Wound vac to LLE " CDI   Neurological:      Mental Status: She is alert and oriented to person, place, and time.   Psychiatric:         Mood and Affect: Mood normal.         Behavior: Behavior normal.         ASSESSMENT / PLAN:  Paroxysmal atrial fibrillation (H)  Anticoagulated on Coumadin  HR controlled 70-90, INR 3.2 today, is off antibiotics  - continue coumadin 2.5mg daily  - not on any rate control medications    Abscess of left foot  Diabetic ulcer of left heel associated with type 2 diabetes mellitus, with muscle involvement without evidence of necrosis (H)  S/p I&D. Pain controlled. Last seen by podiatry on 11/17, completed cipro and Augmentin (11/16)   - continue wound vac cares  - follow-up with podiatry as scheduled  - NWB to LLE  -analgesia with methadone 5mg QID, Lyrica 50mg daily PRN,   - monitor and adjust     Type 2 diabetes mellitus with foot ulcer, with long-term current use of insulin (H)  A1C 7.6%. Has been self managing BG in TCU.   - Insulin pump: patient self managers    Chronic obstructive pulmonary disease, unspecified COPD type (H)  Appear compensated  - monitor     Venous stasis of lower extremity  Chronic BLE edema, had been getting lymphwraps in TCU.   - lymph therapy per PT/OT  - torsemide 20mg daily   - monitor and adjust     Physical deconditioning  Making progress with therapy. Sitting up in wheelchair most of the day. Stand pivot with SBA on visit today  - PT/OT  - will likely need home care on discharge.     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 INR 3.2. Continue coumadin 2.5mg daily, recheck INR in 11/23.     Electronically signed by:  MOE Laird CNP        Sincerely,        MOE Laird CNP

## 2022-11-23 ENCOUNTER — TRANSITIONAL CARE UNIT VISIT (OUTPATIENT)
Dept: GERIATRICS | Facility: CLINIC | Age: 77
End: 2022-11-23
Payer: COMMERCIAL

## 2022-11-23 VITALS
WEIGHT: 264 LBS | RESPIRATION RATE: 20 BRPM | HEART RATE: 61 BPM | TEMPERATURE: 98.3 F | SYSTOLIC BLOOD PRESSURE: 130 MMHG | OXYGEN SATURATION: 99 % | HEIGHT: 64 IN | DIASTOLIC BLOOD PRESSURE: 73 MMHG | BODY MASS INDEX: 45.07 KG/M2

## 2022-11-23 DIAGNOSIS — I48.0 PAROXYSMAL ATRIAL FIBRILLATION (H): Primary | ICD-10-CM

## 2022-11-23 DIAGNOSIS — Z51.81 ENCOUNTER FOR THERAPEUTIC DRUG MONITORING: ICD-10-CM

## 2022-11-23 DIAGNOSIS — Z79.01 LONG TERM (CURRENT) USE OF ANTICOAGULANTS: ICD-10-CM

## 2022-11-23 PROCEDURE — 99308 SBSQ NF CARE LOW MDM 20: CPT | Performed by: NURSE PRACTITIONER

## 2022-11-23 NOTE — PROGRESS NOTES
"[facility] GERIATRICS  ANTICOAGULATION  [place] Medical Record Number: [id number]  Place of Service Where Encounter Took Place: [facility] ([facility]) [[id number]]    HPI:  Linda Loredo is a 77 year old (1945), who is being seen today for an episodic care visit at the above location. HPI information obtained from: facility chart records, facility staff and [facility] chart review. Today's concern is INR/Coumadin management for ELIE. Fib    ROS/Subjective:  Bleeding Signs/Symptoms: None  Thromboembolic Signs/Symptoms: None  Medication Changes: No  Dietary Changes: No  Activity Changes: No  Bacterial/Viral Infection: No  Missed Coumadin Doses: None  On ASA: No  Other Concerns: No    OBJECTIVE:  /73   Pulse 61   Temp 98.3  F (36.8  C)   Resp 20   Ht 1.626 m (5' 4\")   Wt 119.7 kg (264 lb)   SpO2 99%   BMI 45.32 kg/m    Last INR: 3.2 on 11/21  INR Today: 3.3  Current Dose: 2.5mg daily      ASSESSMENT:  1. Paroxysmal atrial fibrillation (H)    2. Encounter for therapeutic drug monitoring    3. Long term (current) use of anticoagulants      Supratherapeutic INR for goal of 2-3    PLAN:     New Dose: hold on 11/23, then 2.5mg daily.     Next INR: 11/28      Electronically signed by:  MOE Laird CNP   "

## 2022-11-23 NOTE — LETTER
"    11/23/2022        RE: Linda Loredo  [address] [address] [address]        [facility] GERIATRICS  ANTICOAGULATION  [place] Medical Record Number: [id number]  Place of Service Where Encounter Took Place: [facility]IE ON [facility] ([facility]) [[id number]]    HPI:  Linda Loredo is a 77 year old (1945), who is being seen today for an episodic care visit at the above location. HPI information obtained from: facility chart records, facility staff and [place] Epic chart review. Today's concern is INR/Coumadin management for A. Fib    ROS/Subjective:  Bleeding Signs/Symptoms: None  Thromboembolic Signs/Symptoms: None  Medication Changes: No  Dietary Changes: No  Activity Changes: No  Bacterial/Viral Infection: No  Missed Coumadin Doses: None  On ASA: No  Other Concerns: No    OBJECTIVE:  /73   Pulse 61   Temp 98.3  F (36.8  C)   Resp 20   Ht 1.626 m (5' 4\")   Wt 119.7 kg (264 lb)   SpO2 99%   BMI 45.32 kg/m    Last INR: 3.2 on 11/21  INR Today: 3.3  Current Dose: 2.5mg daily      ASSESSMENT:  1. Paroxysmal atrial fibrillation (H)    2. Encounter for therapeutic drug monitoring    3. Long term (current) use of anticoagulants      Supratherapeutic INR for goal of 2-3    PLAN:     New Dose: hold on 11/23, then 2.5mg daily.     Next INR: 11/28      Electronically signed by:  MOE Laird CNP         Sincerely,        MOE Laird CNP      "

## 2022-11-28 ENCOUNTER — TELEPHONE (OUTPATIENT)
Dept: GERIATRICS | Facility: CLINIC | Age: 77
End: 2022-11-28

## 2022-11-28 NOTE — PROGRESS NOTES
[facility] GERIATRICS DISCHARGE SUMMARY  Patient Name: Linda Loredo  YOB: 1945  [place] Medical Record Number: [id number]  Place of Service Where Encounter Took Place: [facility] ([facility]) [[id number]]    PRIMARY CARE PROVIDER AND CLINIC RESPONSIBLE AFTER TRANSFER: Ish Brown MD, [place] [id number] [facility] [address]; Non-[facility] Provider     Transferring providers: MOE Wei CNP; Marely Raygoza MD  Recent Hospitalization/ED: [facility] stay 10/31/22 to 11/9/22.  Date of SNF Admission: November 09, 2022  Date of SNF (anticipated) Discharge: November 30, 2022  Discharged to: previous independent home  Cognitive Scores: SLUMS: 18/30  Physical Function: Wheelchair dependent and Pivot transfers ,non weightbearing to LLE  DME: Wheelchair and wound vac, insulin pump     CODE STATUS/ADVANCE DIRECTIVES DISCUSSION: No Order - CPR/Full Code  ALLERGIES: Prednisone, Morphine, Morphine [fumaric acid], Pregabalin, and Seasonal allergies     Linda has had L foot ulcer due to diabetes since about 5/2022.  She has been hospitalized and undergone several surgical I&D of wound to improve healing.  She has been working closely with Podiatry.  She is now able to pivot transfer, lives in senior apartment building, and has family and home care staff to assist her.  Nursing notes she appears to have more redness and swelling of bilateral LE today.     NURSING FACILITY COURSE   Medication Changes/Rationale:     Completed course of cipro and augmentin per Podiatry     Summary of nursing facility stay:   Diabetic ulcer of left heel associated with type 2 diabetes mellitus, with muscle involvement without evidence of necrosis (H)  Type 2 diabetes mellitus with foot ulcer, with long-term current use of insulin (H)  Continues with wound vac, changed 3x weekly per protocol and follows closely with Podiatry at [facility].  Wound not  observed today.   -follow up with Podiatry on 12/1/22   - Med Therapy Management Referral    Paroxysmal atrial fibrillation (H)  Long term (current) use of anticoagulants    11/28/22 INR: 2.5  Last INR 11/23 3.3 held x1 then 2.5mg   11/21 3.2 2.5mg   11/18 3.3 2.5mg every day   11/16 3.2  2.5mg every day  On warfarin 2.5 mg daily, which will continue until seen by home care team, at which time her PCP should be contacted for further orders     Venous stasis of lower extremity  Stable bilateral LE edema, mild erythema today but not warm to touch.  On torsemide, and has compression garment on R LE.    -continue torsemide  -continue elevation and use of compression garments    Chronic pain syndrome  Stable on current regimen of lyrica prn, methadone.   -continue current plan       Discharge Medications:  MED REC REQUIRED  Post Medication Reconciliation Status: discharge medications reconciled, continue medications without change    Current Outpatient Medications   Medication Sig Dispense Refill     HUMALOG 100 UNIT/ML SC SOLN Basal rate 5 units; bolus with 1-5 units TID with meals based on BG       INSULIN PUMP ACCESSORIES MISC as directed 1 one year     Lidocaine (LIDOCARE) 4 % Patch Place 1 patch onto the skin every 24 hours To prevent lidocaine toxicity, patient should be patch free for 12 hrs daily. Right knee       lisinopril (ZESTRIL) 10 MG tablet Take 10 mg by mouth daily       metFORMIN (GLUCOPHAGE XR) 500 MG 24 hr tablet Take 2,000 mg by mouth daily (with dinner)       METHADONE HCL 5 MG OR TABS 5mg PO 6 A.M.; 5mg PO 11 A.M.; 10mg PO at 3 p.m.; 5mg PO 8:00 p.m.       ONE TOUCH TEST STRIPS TEST   VI use as directed 100 prn     oxybutynin (DITROPAN) 5 MG tablet Take 5 mg by mouth 2 times daily as needed for bladder spasms       pregabalin (LYRICA) 50 MG capsule Take 50 mg by mouth 3 times daily as needed       simvastatin (ZOCOR) 20 MG tablet Take 20 mg by mouth At Bedtime       torsemide (DEMADEX) 20 MG tablet  "Take 40 mg by mouth 2 times daily       Warfarin Therapy Reminder Take 1 each by mouth See Admin Instructions See facility orders for current dose. Next INR 11/10/22       Controlled medications:   will discharge with remaining pills of lyrica, methadone     Past Medical History:   Past Medical History:   Diagnosis Date     Depressive disorder, not elsewhere classified      Herpes zoster without mention of complication      Hypercalcemia 2/24/2007     Mild intermittent asthma      Other urinary incontinence      Type II or unspecified type diabetes mellitus with renal manifestations, uncontrolled(250.42) (H)      Type II or unspecified type diabetes mellitus without mention of complication, not stated as uncontrolled      Unspecified essential hypertension      Physical Exam:   Vitals: /72   Pulse 80   Temp 98  F (36.7  C)   Resp 18   Ht 1.626 m (5' 4\")   Wt 120.8 kg (266 lb 6.4 oz)   SpO2 98%   BMI 45.73 kg/m    BMI: Body mass index is 45.73 kg/m .  GENERAL APPEARANCE:  Alert, in no distress   CHEST/RESP:  respiratory effort normal, no respiratory distress   CV:  Rate and rhythm reg, 3+ peripheral edema bilateral LE  SKIN:  Bilateral LE skin is purplish red in color without warmth, no weeping  PSYCH:  Alert and oriented to person-place-time , affect anxious, judgement appropriate        SNF labs:   Reviewed in Epic    DISCHARGE PLAN:    Follow up labs: INR due about 12/2/22 to be drawn by home care with results to PCP     Medical Follow Up:     Follow up with Podiatry per schedule on 12/1/22  [facility] scheduled appointments: None.    Discharge Services: Home Care: Physical Therapy, Occupational Therapy, Registered Nurse. From: To be determined.    Discharge Instructions Verbalized to Patient at Discharge:     Weight bearing restrictions:  Non-weight bearing L LE    TOTAL DISCHARGE TIME: Greater than 30 minutes  Electronically signed by:  MOE Wei CNP    Documentation of Face to Face " and Certification for Home Health Services    I certify that services are/were furnished while this patient was under the care of a physician and that a physician or an allowed non-physician practitioner (NPP), had a face-to-face encounter that meets the physician face-to-face encounter requirements. The encounter was in whole, or in part, related to the primary reason for home health. The patient is confined to his/her home and needs intermittent skilled nursing, physical therapy, speech-language pathology, or the continued need for occupational therapy. A plan of care has been established by a physician and is periodically reviewed by a physician.  Date of Face-to-Face Encounter: 11/29/2022.    I certify that, based on my findings, the following services are medically necessary home health services: Nursing, Occupational Therapy and Physical Therapy.    My clinical findings support the need for the above skilled services because: Requires assistance of another person or specialized equipment to access medical services because patient: Is unable to walk greater than 0 feet without rest.., non weightbearing to LL E    Patient to re-establish plan of care with their PCP within 7-10 days after leaving the facility to reestablish care.  Medicare certified [facility] provider: MOE Wei CNP  Date: November 28, 2022

## 2022-11-28 NOTE — TELEPHONE ENCOUNTER
[facility] Triage Nurse INR     Provider: MOE Laboy CNP  Facility: [facility]   Facility Type:  TCU    Caller: Avril   Call Back Number: [phone]  Reason for call: INR  Diagnosis/Goal: A. Fib    Todays INR: 2.5  Last INR 11/23 3.3 held x1 then 2.5mg   11/21 3.2 2.5mg   11/18 3.3 2.5mg every day   11/16 3.2  2.5mg every day      Heparin/Lovenox:  No  Currently on ABX?: No Completed Cipro and Augmentin on 11/16  Other interacting medication:  None  Missed or refused doses: No    Verbal Order/Direction given by Provider:     Warfarin 2.5 mg PO every day. Recheck INR in 1 week 12/5    Provider Giving Order:  MOE Zhang CNP    Verbal Order given to: Galina Alberto RN

## 2022-11-29 ENCOUNTER — DISCHARGE SUMMARY NURSING HOME (OUTPATIENT)
Dept: GERIATRICS | Facility: CLINIC | Age: 77
End: 2022-11-29
Payer: COMMERCIAL

## 2022-11-29 VITALS
HEART RATE: 80 BPM | SYSTOLIC BLOOD PRESSURE: 119 MMHG | WEIGHT: 266.4 LBS | BODY MASS INDEX: 45.48 KG/M2 | OXYGEN SATURATION: 98 % | DIASTOLIC BLOOD PRESSURE: 72 MMHG | TEMPERATURE: 98 F | HEIGHT: 64 IN | RESPIRATION RATE: 18 BRPM

## 2022-11-29 DIAGNOSIS — E11.621 DIABETIC ULCER OF LEFT HEEL ASSOCIATED WITH TYPE 2 DIABETES MELLITUS, WITH MUSCLE INVOLVEMENT WITHOUT EVIDENCE OF NECROSIS (H): Primary | ICD-10-CM

## 2022-11-29 DIAGNOSIS — Z79.4 TYPE 2 DIABETES MELLITUS WITH FOOT ULCER, WITH LONG-TERM CURRENT USE OF INSULIN (H): ICD-10-CM

## 2022-11-29 DIAGNOSIS — L97.509 TYPE 2 DIABETES MELLITUS WITH FOOT ULCER, WITH LONG-TERM CURRENT USE OF INSULIN (H): ICD-10-CM

## 2022-11-29 DIAGNOSIS — Z79.01 LONG TERM (CURRENT) USE OF ANTICOAGULANTS: ICD-10-CM

## 2022-11-29 DIAGNOSIS — I87.8 VENOUS STASIS OF LOWER EXTREMITY: ICD-10-CM

## 2022-11-29 DIAGNOSIS — E11.621 TYPE 2 DIABETES MELLITUS WITH FOOT ULCER, WITH LONG-TERM CURRENT USE OF INSULIN (H): ICD-10-CM

## 2022-11-29 DIAGNOSIS — G89.4 CHRONIC PAIN SYNDROME: ICD-10-CM

## 2022-11-29 DIAGNOSIS — L97.425 DIABETIC ULCER OF LEFT HEEL ASSOCIATED WITH TYPE 2 DIABETES MELLITUS, WITH MUSCLE INVOLVEMENT WITHOUT EVIDENCE OF NECROSIS (H): Primary | ICD-10-CM

## 2022-11-29 DIAGNOSIS — I48.0 PAROXYSMAL ATRIAL FIBRILLATION (H): ICD-10-CM

## 2022-11-29 PROCEDURE — 99316 NF DSCHRG MGMT 30 MIN+: CPT | Performed by: NURSE PRACTITIONER

## 2022-11-29 NOTE — LETTER
11/29/2022        RE: Linda Loredo  [address] [address] [address]        [facility] GERIATRICS DISCHARGE SUMMARY  Patient Name: Linda Loredo  YOB: 1945  [place] Medical Record Number: [id number]  Place of Service Where Encounter Took Place: [place] [facility] (SNF) [[id number]]    PRIMARY CARE PROVIDER AND CLINIC RESPONSIBLE AFTER TRANSFER: Ish Brown MD, [facility] [address] [facility] [address]; Non-[facility] Provider     Transferring providers: MOE Wei CNP; Marely Raygoza MD  Recent Hospitalization/ED: [facility] stay 10/31/22 to 11/9/22.  Date of SNF Admission: November 09, 2022  Date of SNF (anticipated) Discharge: November 30, 2022  Discharged to: previous independent home  Cognitive Scores: SLUMS: 18/30  Physical Function: Wheelchair dependent and Pivot transfers ,non weightbearing to LLE  DME: Wheelchair and wound vac, insulin pump     CODE STATUS/ADVANCE DIRECTIVES DISCUSSION: No Order - CPR/Full Code  ALLERGIES: Prednisone, Morphine, Morphine [fumaric acid], Pregabalin, and Seasonal allergies     Linda has had L foot ulcer due to diabetes since about 5/2022.  She has been hospitalized and undergone several surgical I&D of wound to improve healing.  She has been working closely with Podiatry.  She is now able to pivot transfer, lives in senior apartment building, and has family and home care staff to assist her.  Nursing notes she appears to have more redness and swelling of bilateral LE today.     NURSING FACILITY COURSE   Medication Changes/Rationale:     Completed course of cipro and augmentin per Podiatry     Summary of nursing facility stay:   Diabetic ulcer of left heel associated with type 2 diabetes mellitus, with muscle involvement without evidence of necrosis (H)  Type 2 diabetes mellitus with foot ulcer, with long-term current use of insulin (H)  Continues with  wound vac, changed 3x weekly per protocol and follows closely with Podiatry at [facility].  Wound not observed today.   -follow up with Podiatry on 12/1/22   - Med Therapy Management Referral    Paroxysmal atrial fibrillation (H)  Long term (current) use of anticoagulants    11/28/22 INR: 2.5  Last INR 11/23 3.3 held x1 then 2.5mg   11/21 3.2 2.5mg   11/18 3.3 2.5mg every day   11/16 3.2  2.5mg every day  On warfarin 2.5 mg daily, which will continue until seen by home care team, at which time her PCP should be contacted for further orders     Venous stasis of lower extremity  Stable bilateral LE edema, mild erythema today but not warm to touch.  On torsemide, and has compression garment on R LE.    -continue torsemide  -continue elevation and use of compression garments    Chronic pain syndrome  Stable on current regimen of lyrica prn, methadone.   -continue current plan       Discharge Medications:  MED REC REQUIRED  Post Medication Reconciliation Status: discharge medications reconciled, continue medications without change    Current Outpatient Medications   Medication Sig Dispense Refill     HUMALOG 100 UNIT/ML SC SOLN Basal rate 5 units; bolus with 1-5 units TID with meals based on BG       INSULIN PUMP ACCESSORIES MISC as directed 1 one year     Lidocaine (LIDOCARE) 4 % Patch Place 1 patch onto the skin every 24 hours To prevent lidocaine toxicity, patient should be patch free for 12 hrs daily. Right knee       lisinopril (ZESTRIL) 10 MG tablet Take 10 mg by mouth daily       metFORMIN (GLUCOPHAGE XR) 500 MG 24 hr tablet Take 2,000 mg by mouth daily (with dinner)       METHADONE HCL 5 MG OR TABS 5mg PO 6 A.M.; 5mg PO 11 A.M.; 10mg PO at 3 p.m.; 5mg PO 8:00 p.m.       ONE TOUCH TEST STRIPS TEST   VI use as directed 100 prn     oxybutynin (DITROPAN) 5 MG tablet Take 5 mg by mouth 2 times daily as needed for bladder spasms       pregabalin (LYRICA) 50 MG capsule Take 50 mg by mouth 3 times daily as needed    "    simvastatin (ZOCOR) 20 MG tablet Take 20 mg by mouth At Bedtime       torsemide (DEMADEX) 20 MG tablet Take 40 mg by mouth 2 times daily       Warfarin Therapy Reminder Take 1 each by mouth See Admin Instructions See facility orders for current dose. Next INR 11/10/22       Controlled medications:   will discharge with remaining pills of lyrica, methadone     Past Medical History:   Past Medical History:   Diagnosis Date     Depressive disorder, not elsewhere classified      Herpes zoster without mention of complication      Hypercalcemia 2/24/2007     Mild intermittent asthma      Other urinary incontinence      Type II or unspecified type diabetes mellitus with renal manifestations, uncontrolled(250.42) (H)      Type II or unspecified type diabetes mellitus without mention of complication, not stated as uncontrolled      Unspecified essential hypertension      Physical Exam:   Vitals: /72   Pulse 80   Temp 98  F (36.7  C)   Resp 18   Ht 1.626 m (5' 4\")   Wt 120.8 kg (266 lb 6.4 oz)   SpO2 98%   BMI 45.73 kg/m    BMI: Body mass index is 45.73 kg/m .  GENERAL APPEARANCE:  Alert, in no distress   CHEST/RESP:  respiratory effort normal, no respiratory distress   CV:  Rate and rhythm reg, 3+ peripheral edema bilateral LE  SKIN:  Bilateral LE skin is purplish red in color without warmth, no weeping  PSYCH:  Alert and oriented to person-place-time , affect anxious, judgement appropriate        SNF labs:   Reviewed in Epic    DISCHARGE PLAN:    Follow up labs: INR due about 12/2/22 to be drawn by home care with results to PCP     Medical Follow Up:     Follow up with Podiatry per schedule on 12/1/22  [facility] scheduled appointments: None.    Discharge Services: Home Care: Physical Therapy, Occupational Therapy, Registered Nurse. From: To be determined.    Discharge Instructions Verbalized to Patient at Discharge:     Weight bearing restrictions:  Non-weight bearing L LE    TOTAL DISCHARGE TIME: " Greater than 30 minutes  Electronically signed by:  MOE Wei CNP    Documentation of Face to Face and Certification for Home Health Services    I certify that services are/were furnished while this patient was under the care of a physician and that a physician or an allowed non-physician practitioner (NPP), had a face-to-face encounter that meets the physician face-to-face encounter requirements. The encounter was in whole, or in part, related to the primary reason for home health. The patient is confined to his/her home and needs intermittent skilled nursing, physical therapy, speech-language pathology, or the continued need for occupational therapy. A plan of care has been established by a physician and is periodically reviewed by a physician.  Date of Face-to-Face Encounter: 11/29/2022.    I certify that, based on my findings, the following services are medically necessary home health services: Nursing, Occupational Therapy and Physical Therapy.    My clinical findings support the need for the above skilled services because: Requires assistance of another person or specialized equipment to access medical services because patient: Is unable to walk greater than 0 feet without rest.., non weightbearing to LL E    Patient to re-establish plan of care with their PCP within 7-10 days after leaving the facility to reestablish care.  Medicare certified PECOS provider: MOE Wei CNP  Date: November 28, 2022        Sincerely,        MOE Wei CNP

## 2023-03-17 DIAGNOSIS — I48.0 PAROXYSMAL ATRIAL FIBRILLATION (H): Primary | ICD-10-CM

## 2023-04-16 ENCOUNTER — HEALTH MAINTENANCE LETTER (OUTPATIENT)
Age: 78
End: 2023-04-16

## 2023-05-09 ENCOUNTER — LAB REQUISITION (OUTPATIENT)
Dept: LAB | Facility: CLINIC | Age: 78
End: 2023-05-09

## 2023-05-10 PROCEDURE — 36415 COLL VENOUS BLD VENIPUNCTURE: CPT | Performed by: FAMILY MEDICINE

## 2023-05-10 PROCEDURE — 86481 TB AG RESPONSE T-CELL SUSP: CPT | Performed by: FAMILY MEDICINE

## 2023-05-10 PROCEDURE — P9604 ONE-WAY ALLOW PRORATED TRIP: HCPCS | Performed by: FAMILY MEDICINE

## 2023-05-11 LAB
QUANTIFERON MITOGEN: 0.8 IU/ML
QUANTIFERON NIL TUBE: 0.02 IU/ML
QUANTIFERON TB1 TUBE: 0.16 IU/ML
QUANTIFERON TB2 TUBE: 0.04

## 2023-05-12 LAB
GAMMA INTERFERON BACKGROUND BLD IA-ACNC: 0.02 IU/ML
M TB IFN-G BLD-IMP: NEGATIVE
M TB IFN-G CD4+ BCKGRND COR BLD-ACNC: 0.78 IU/ML
MITOGEN IGNF BCKGRD COR BLD-ACNC: 0.02 IU/ML
MITOGEN IGNF BCKGRD COR BLD-ACNC: 0.14 IU/ML

## 2023-05-25 ENCOUNTER — LAB REQUISITION (OUTPATIENT)
Dept: LAB | Facility: CLINIC | Age: 78
End: 2023-05-25

## 2023-05-25 DIAGNOSIS — I48.0 PAROXYSMAL ATRIAL FIBRILLATION (H): ICD-10-CM

## 2023-05-26 ENCOUNTER — HOSPITAL ENCOUNTER (INPATIENT)
Facility: CLINIC | Age: 78
LOS: 5 days | Discharge: NURSING, REHAB OR LONG-TERM CARE | DRG: 872 | End: 2023-05-31
Attending: FAMILY MEDICINE | Admitting: INTERNAL MEDICINE
Payer: COMMERCIAL

## 2023-05-26 ENCOUNTER — MEDICAL CORRESPONDENCE (OUTPATIENT)
Dept: HEALTH INFORMATION MANAGEMENT | Facility: CLINIC | Age: 78
End: 2023-05-26

## 2023-05-26 ENCOUNTER — APPOINTMENT (OUTPATIENT)
Dept: GENERAL RADIOLOGY | Facility: CLINIC | Age: 78
DRG: 872 | End: 2023-05-26
Attending: FAMILY MEDICINE
Payer: COMMERCIAL

## 2023-05-26 DIAGNOSIS — R11.0 NAUSEA: ICD-10-CM

## 2023-05-26 DIAGNOSIS — B37.31 CANDIDIASIS OF VAGINA: ICD-10-CM

## 2023-05-26 DIAGNOSIS — A41.9 SEPSIS WITHOUT ACUTE ORGAN DYSFUNCTION, DUE TO UNSPECIFIED ORGANISM (H): ICD-10-CM

## 2023-05-26 DIAGNOSIS — L03.818 CELLULITIS OF OTHER SPECIFIED SITE: ICD-10-CM

## 2023-05-26 DIAGNOSIS — E11.8 TYPE 2 DIABETES MELLITUS WITH COMPLICATION, WITH LONG-TERM CURRENT USE OF INSULIN (H): Primary | ICD-10-CM

## 2023-05-26 DIAGNOSIS — K21.9 GASTROESOPHAGEAL REFLUX DISEASE WITHOUT ESOPHAGITIS: ICD-10-CM

## 2023-05-26 DIAGNOSIS — Z79.4 TYPE 2 DIABETES MELLITUS WITH COMPLICATION, WITH LONG-TERM CURRENT USE OF INSULIN (H): Primary | ICD-10-CM

## 2023-05-26 DIAGNOSIS — I48.0 PAROXYSMAL ATRIAL FIBRILLATION (H): ICD-10-CM

## 2023-05-26 DIAGNOSIS — G89.4 CHRONIC PAIN SYNDROME: ICD-10-CM

## 2023-05-26 DIAGNOSIS — B37.2 CANDIDIASIS OF SKIN: ICD-10-CM

## 2023-05-26 DIAGNOSIS — A41.9 SEPSIS, DUE TO UNSPECIFIED ORGANISM, UNSPECIFIED WHETHER ACUTE ORGAN DYSFUNCTION PRESENT (H): ICD-10-CM

## 2023-05-26 DIAGNOSIS — L89.309 PRESSURE INJURY OF SKIN OF BUTTOCK, UNSPECIFIED INJURY STAGE, UNSPECIFIED LATERALITY: ICD-10-CM

## 2023-05-26 DIAGNOSIS — L03.317 CELLULITIS OF BUTTOCK: ICD-10-CM

## 2023-05-26 DIAGNOSIS — L89.90 PRESSURE INJURY OF SKIN, UNSPECIFIED INJURY STAGE, UNSPECIFIED LOCATION: ICD-10-CM

## 2023-05-26 PROBLEM — E86.0 DEHYDRATION: Status: ACTIVE | Noted: 2023-05-26

## 2023-05-26 PROBLEM — N17.9 AKI (ACUTE KIDNEY INJURY) (H): Status: ACTIVE | Noted: 2023-05-26

## 2023-05-26 PROBLEM — N39.0 UTI (URINARY TRACT INFECTION): Status: ACTIVE | Noted: 2023-05-26

## 2023-05-26 LAB
ALBUMIN SERPL BCG-MCNC: 3.5 G/DL (ref 3.5–5.2)
ALBUMIN UR-MCNC: 100 MG/DL
ALP SERPL-CCNC: 90 U/L (ref 35–104)
ALT SERPL W P-5'-P-CCNC: 23 U/L (ref 10–35)
ANION GAP SERPL CALCULATED.3IONS-SCNC: 11 MMOL/L (ref 7–15)
APPEARANCE UR: ABNORMAL
AST SERPL W P-5'-P-CCNC: 43 U/L (ref 10–35)
BACTERIA #/AREA URNS HPF: ABNORMAL /HPF
BASOPHILS # BLD AUTO: 0 10E3/UL (ref 0–0.2)
BASOPHILS NFR BLD AUTO: 0 %
BILIRUB SERPL-MCNC: 0.6 MG/DL
BILIRUB UR QL STRIP: NEGATIVE
BUN SERPL-MCNC: 49.1 MG/DL (ref 8–23)
CA-I BLD-MCNC: 5.4 MG/DL (ref 4.4–5.2)
CALCIUM SERPL-MCNC: 10.9 MG/DL (ref 8.8–10.2)
CHLORIDE SERPL-SCNC: 100 MMOL/L (ref 98–107)
COLOR UR AUTO: ABNORMAL
CREAT SERPL-MCNC: 1.24 MG/DL (ref 0.51–0.95)
CRP SERPL-MCNC: 80.57 MG/L
DEPRECATED HCO3 PLAS-SCNC: 30 MMOL/L (ref 22–29)
EOSINOPHIL # BLD AUTO: 0.1 10E3/UL (ref 0–0.7)
EOSINOPHIL NFR BLD AUTO: 1 %
ERYTHROCYTE [DISTWIDTH] IN BLOOD BY AUTOMATED COUNT: 13.5 % (ref 10–15)
GFR SERPL CREATININE-BSD FRML MDRD: 44 ML/MIN/1.73M2
GLUCOSE BLDC GLUCOMTR-MCNC: 124 MG/DL (ref 70–99)
GLUCOSE BLDC GLUCOMTR-MCNC: 174 MG/DL (ref 70–99)
GLUCOSE BLDC GLUCOMTR-MCNC: 71 MG/DL (ref 70–99)
GLUCOSE SERPL-MCNC: 65 MG/DL (ref 70–99)
GLUCOSE UR STRIP-MCNC: NEGATIVE MG/DL
HCT VFR BLD AUTO: 38.5 % (ref 35–47)
HGB BLD-MCNC: 12.5 G/DL (ref 11.7–15.7)
HGB UR QL STRIP: ABNORMAL
HOLD SPECIMEN: NORMAL
IMM GRANULOCYTES # BLD: 0 10E3/UL
IMM GRANULOCYTES NFR BLD: 0 %
INR PPP: 4.76 (ref 0.85–1.15)
INR PPP: 5.24 (ref 0.85–1.15)
KETONES UR STRIP-MCNC: NEGATIVE MG/DL
LACTATE SERPL-SCNC: 0.9 MMOL/L (ref 0.7–2)
LACTATE SERPL-SCNC: 2.4 MMOL/L (ref 0.7–2)
LACTATE SERPL-SCNC: 4.4 MMOL/L (ref 0.7–2)
LEUKOCYTE ESTERASE UR QL STRIP: ABNORMAL
LYMPHOCYTES # BLD AUTO: 1.5 10E3/UL (ref 0.8–5.3)
LYMPHOCYTES NFR BLD AUTO: 14 %
MAGNESIUM SERPL-MCNC: 1.6 MG/DL (ref 1.7–2.3)
MCH RBC QN AUTO: 29.6 PG (ref 26.5–33)
MCHC RBC AUTO-ENTMCNC: 32.5 G/DL (ref 31.5–36.5)
MCV RBC AUTO: 91 FL (ref 78–100)
MONOCYTES # BLD AUTO: 0.9 10E3/UL (ref 0–1.3)
MONOCYTES NFR BLD AUTO: 8 %
MUCOUS THREADS #/AREA URNS LPF: PRESENT /LPF
NEUTROPHILS # BLD AUTO: 8.1 10E3/UL (ref 1.6–8.3)
NEUTROPHILS NFR BLD AUTO: 77 %
NITRATE UR QL: NEGATIVE
NRBC # BLD AUTO: 0 10E3/UL
NRBC BLD AUTO-RTO: 0 /100
PH UR STRIP: 5 [PH] (ref 5–7)
PLATELET # BLD AUTO: 178 10E3/UL (ref 150–450)
POTASSIUM SERPL-SCNC: 3.7 MMOL/L (ref 3.4–5.3)
PROCALCITONIN SERPL IA-MCNC: 0.15 NG/ML
PROT SERPL-MCNC: 7.5 G/DL (ref 6.4–8.3)
RBC # BLD AUTO: 4.22 10E6/UL (ref 3.8–5.2)
RBC URINE: >182 /HPF
SODIUM SERPL-SCNC: 141 MMOL/L (ref 136–145)
SP GR UR STRIP: 1.01 (ref 1–1.03)
SQUAMOUS EPITHELIAL: 3 /HPF
UROBILINOGEN UR STRIP-MCNC: NORMAL MG/DL
WBC # BLD AUTO: 10.7 10E3/UL (ref 4–11)
WBC URINE: 88 /HPF

## 2023-05-26 PROCEDURE — 51702 INSERT TEMP BLADDER CATH: CPT | Performed by: FAMILY MEDICINE

## 2023-05-26 PROCEDURE — 250N000009 HC RX 250: Performed by: FAMILY MEDICINE

## 2023-05-26 PROCEDURE — 36415 COLL VENOUS BLD VENIPUNCTURE: CPT | Performed by: FAMILY MEDICINE

## 2023-05-26 PROCEDURE — 258N000003 HC RX IP 258 OP 636: Performed by: FAMILY MEDICINE

## 2023-05-26 PROCEDURE — 85025 COMPLETE CBC W/AUTO DIFF WBC: CPT | Performed by: FAMILY MEDICINE

## 2023-05-26 PROCEDURE — 99292 CRITICAL CARE ADDL 30 MIN: CPT | Performed by: FAMILY MEDICINE

## 2023-05-26 PROCEDURE — 87040 BLOOD CULTURE FOR BACTERIA: CPT | Performed by: FAMILY MEDICINE

## 2023-05-26 PROCEDURE — 250N000011 HC RX IP 250 OP 636: Performed by: FAMILY MEDICINE

## 2023-05-26 PROCEDURE — 99291 CRITICAL CARE FIRST HOUR: CPT | Mod: 25 | Performed by: FAMILY MEDICINE

## 2023-05-26 PROCEDURE — 85610 PROTHROMBIN TIME: CPT | Performed by: FAMILY MEDICINE

## 2023-05-26 PROCEDURE — 87088 URINE BACTERIA CULTURE: CPT | Performed by: FAMILY MEDICINE

## 2023-05-26 PROCEDURE — 36569 INSJ PICC 5 YR+ W/O IMAGING: CPT

## 2023-05-26 PROCEDURE — 84145 PROCALCITONIN (PCT): CPT | Performed by: FAMILY MEDICINE

## 2023-05-26 PROCEDURE — 99222 1ST HOSP IP/OBS MODERATE 55: CPT | Mod: AI | Performed by: INTERNAL MEDICINE

## 2023-05-26 PROCEDURE — P9604 ONE-WAY ALLOW PRORATED TRIP: HCPCS | Performed by: FAMILY MEDICINE

## 2023-05-26 PROCEDURE — 200N000001 HC R&B ICU

## 2023-05-26 PROCEDURE — 81001 URINALYSIS AUTO W/SCOPE: CPT | Performed by: FAMILY MEDICINE

## 2023-05-26 PROCEDURE — 82330 ASSAY OF CALCIUM: CPT | Performed by: FAMILY MEDICINE

## 2023-05-26 PROCEDURE — 258N000003 HC RX IP 258 OP 636: Performed by: INTERNAL MEDICINE

## 2023-05-26 PROCEDURE — 82962 GLUCOSE BLOOD TEST: CPT

## 2023-05-26 PROCEDURE — 86140 C-REACTIVE PROTEIN: CPT | Performed by: FAMILY MEDICINE

## 2023-05-26 PROCEDURE — 83605 ASSAY OF LACTIC ACID: CPT | Performed by: FAMILY MEDICINE

## 2023-05-26 PROCEDURE — 83735 ASSAY OF MAGNESIUM: CPT | Performed by: FAMILY MEDICINE

## 2023-05-26 PROCEDURE — 272N000580 HC KIT, 4 OR 5FR DUAL LUMEN POWER PICC

## 2023-05-26 PROCEDURE — 80053 COMPREHEN METABOLIC PANEL: CPT | Performed by: FAMILY MEDICINE

## 2023-05-26 PROCEDURE — 999N000065 XR CHEST PORT 1 VIEW

## 2023-05-26 PROCEDURE — 99291 CRITICAL CARE FIRST HOUR: CPT | Performed by: FAMILY MEDICINE

## 2023-05-26 PROCEDURE — 250N000013 HC RX MED GY IP 250 OP 250 PS 637: Performed by: INTERNAL MEDICINE

## 2023-05-26 PROCEDURE — 250N000013 HC RX MED GY IP 250 OP 250 PS 637: Performed by: FAMILY MEDICINE

## 2023-05-26 RX ORDER — ACETAMINOPHEN 650 MG/1
650 SUPPOSITORY RECTAL EVERY 6 HOURS PRN
Status: DISCONTINUED | OUTPATIENT
Start: 2023-05-26 | End: 2023-05-31 | Stop reason: HOSPADM

## 2023-05-26 RX ORDER — SODIUM CHLORIDE 9 MG/ML
INJECTION, SOLUTION INTRAVENOUS CONTINUOUS
Status: DISCONTINUED | OUTPATIENT
Start: 2023-05-26 | End: 2023-05-28

## 2023-05-26 RX ORDER — METHADONE HYDROCHLORIDE 10 MG/1
10 TABLET ORAL ONCE
Status: COMPLETED | OUTPATIENT
Start: 2023-05-26 | End: 2023-05-26

## 2023-05-26 RX ORDER — ONDANSETRON 4 MG/1
4 TABLET, ORALLY DISINTEGRATING ORAL EVERY 6 HOURS PRN
Status: DISCONTINUED | OUTPATIENT
Start: 2023-05-26 | End: 2023-05-26

## 2023-05-26 RX ORDER — NICOTINE POLACRILEX 4 MG
15-30 LOZENGE BUCCAL
Status: DISCONTINUED | OUTPATIENT
Start: 2023-05-26 | End: 2023-05-31 | Stop reason: HOSPADM

## 2023-05-26 RX ORDER — ACETAMINOPHEN 325 MG/1
650 TABLET ORAL EVERY 4 HOURS PRN
Status: DISCONTINUED | OUTPATIENT
Start: 2023-05-26 | End: 2023-05-26

## 2023-05-26 RX ORDER — SODIUM CHLORIDE 9 MG/ML
INJECTION, SOLUTION INTRAVENOUS CONTINUOUS
Status: DISCONTINUED | OUTPATIENT
Start: 2023-05-26 | End: 2023-05-26

## 2023-05-26 RX ORDER — PIPERACILLIN SODIUM, TAZOBACTAM SODIUM 4; .5 G/20ML; G/20ML
4.5 INJECTION, POWDER, LYOPHILIZED, FOR SOLUTION INTRAVENOUS ONCE
Status: COMPLETED | OUTPATIENT
Start: 2023-05-26 | End: 2023-05-26

## 2023-05-26 RX ORDER — WARFARIN SODIUM 2.5 MG/1
2.5 TABLET ORAL
COMMUNITY
Start: 2023-03-10 | End: 2023-05-31

## 2023-05-26 RX ORDER — ONDANSETRON 4 MG/1
4 TABLET, ORALLY DISINTEGRATING ORAL EVERY 6 HOURS PRN
Status: DISCONTINUED | OUTPATIENT
Start: 2023-05-26 | End: 2023-05-31 | Stop reason: HOSPADM

## 2023-05-26 RX ORDER — PENTOXIFYLLINE 400 MG/1
400 TABLET, EXTENDED RELEASE ORAL
Status: ON HOLD | COMMUNITY
Start: 2023-04-26 | End: 2023-09-23

## 2023-05-26 RX ORDER — ONDANSETRON 2 MG/ML
4 INJECTION INTRAMUSCULAR; INTRAVENOUS EVERY 6 HOURS PRN
Status: DISCONTINUED | OUTPATIENT
Start: 2023-05-26 | End: 2023-05-26

## 2023-05-26 RX ORDER — METHADONE HYDROCHLORIDE 5 MG/1
10 TABLET ORAL DAILY
Status: ON HOLD | COMMUNITY
Start: 2023-05-13 | End: 2023-05-31

## 2023-05-26 RX ORDER — ONDANSETRON 2 MG/ML
4 INJECTION INTRAMUSCULAR; INTRAVENOUS EVERY 6 HOURS PRN
Status: DISCONTINUED | OUTPATIENT
Start: 2023-05-26 | End: 2023-05-31 | Stop reason: HOSPADM

## 2023-05-26 RX ORDER — SIMVASTATIN 20 MG
20 TABLET ORAL AT BEDTIME
Status: DISCONTINUED | OUTPATIENT
Start: 2023-05-26 | End: 2023-05-31 | Stop reason: HOSPADM

## 2023-05-26 RX ORDER — WARFARIN SODIUM 3 MG/1
3 TABLET ORAL
Status: ON HOLD | COMMUNITY
Start: 2023-05-18 | End: 2023-05-31

## 2023-05-26 RX ORDER — ACETAMINOPHEN 325 MG/1
650 TABLET ORAL EVERY 6 HOURS PRN
Status: DISCONTINUED | OUTPATIENT
Start: 2023-05-26 | End: 2023-05-31 | Stop reason: HOSPADM

## 2023-05-26 RX ORDER — LIDOCAINE 40 MG/G
CREAM TOPICAL
Status: ACTIVE | OUTPATIENT
Start: 2023-05-26 | End: 2023-05-29

## 2023-05-26 RX ORDER — WARFARIN SODIUM 3 MG/1
3.75 TABLET ORAL
COMMUNITY
Start: 2022-07-02 | End: 2023-05-26

## 2023-05-26 RX ORDER — LIDOCAINE HYDROCHLORIDE 20 MG/ML
JELLY TOPICAL ONCE
Status: COMPLETED | OUTPATIENT
Start: 2023-05-26 | End: 2023-05-26

## 2023-05-26 RX ORDER — PIPERACILLIN SODIUM, TAZOBACTAM SODIUM 3; .375 G/15ML; G/15ML
3.38 INJECTION, POWDER, LYOPHILIZED, FOR SOLUTION INTRAVENOUS EVERY 6 HOURS
Status: DISCONTINUED | OUTPATIENT
Start: 2023-05-27 | End: 2023-05-27

## 2023-05-26 RX ORDER — DEXTROSE MONOHYDRATE 25 G/50ML
25-50 INJECTION, SOLUTION INTRAVENOUS
Status: DISCONTINUED | OUTPATIENT
Start: 2023-05-26 | End: 2023-05-31 | Stop reason: HOSPADM

## 2023-05-26 RX ORDER — LIDOCAINE 40 MG/G
CREAM TOPICAL
Status: DISCONTINUED | OUTPATIENT
Start: 2023-05-26 | End: 2023-05-31 | Stop reason: HOSPADM

## 2023-05-26 RX ADMIN — SODIUM CHLORIDE 1000 ML: 9 INJECTION, SOLUTION INTRAVENOUS at 18:26

## 2023-05-26 RX ADMIN — SIMVASTATIN 20 MG: 20 TABLET, FILM COATED ORAL at 22:16

## 2023-05-26 RX ADMIN — LIDOCAINE HYDROCHLORIDE 2 ML: 10 INJECTION, SOLUTION EPIDURAL; INFILTRATION; INTRACAUDAL; PERINEURAL at 20:30

## 2023-05-26 RX ADMIN — VANCOMYCIN HYDROCHLORIDE 1500 MG: 1 INJECTION, POWDER, LYOPHILIZED, FOR SOLUTION INTRAVENOUS at 19:15

## 2023-05-26 RX ADMIN — PIPERACILLIN AND TAZOBACTAM 4.5 G: 4; .5 INJECTION, POWDER, FOR SOLUTION INTRAVENOUS at 18:27

## 2023-05-26 RX ADMIN — FAMOTIDINE 20 MG: 10 INJECTION, SOLUTION INTRAVENOUS at 22:16

## 2023-05-26 RX ADMIN — METHADONE HYDROCHLORIDE 10 MG: 10 TABLET ORAL at 18:48

## 2023-05-26 RX ADMIN — LIDOCAINE HYDROCHLORIDE: 20 JELLY TOPICAL at 18:49

## 2023-05-26 RX ADMIN — SODIUM CHLORIDE: 9 INJECTION, SOLUTION INTRAVENOUS at 21:33

## 2023-05-26 ASSESSMENT — ACTIVITIES OF DAILY LIVING (ADL)
ADLS_ACUITY_SCORE: 35
DRESSING/BATHING_DIFFICULTY: YES
DRESS: 1-->ASSISTANCE (EQUIPMENT/PERSON) NEEDED
TOILETING: 1-->ASSISTANCE (EQUIPMENT/PERSON) NEEDED
DOING_ERRANDS_INDEPENDENTLY_DIFFICULTY: NO
TOILETING_ISSUES: YES
DRESSING/BATHING: DRESSING DIFFICULTY, ASSISTANCE 1 PERSON
EQUIPMENT_CURRENTLY_USED_AT_HOME: WALKER, HEMI
WEAR_GLASSES_OR_BLIND: NO
DRESS: 0-->ASSISTANCE NEEDED (DEVELOPMENTALLY APPROPRIATE)
ADLS_ACUITY_SCORE: 33
CHANGE_IN_FUNCTIONAL_STATUS_SINCE_ONSET_OF_CURRENT_ILLNESS/INJURY: YES
ADLS_ACUITY_SCORE: 35
TOILETING: 0-->NOT TOILET TRAINED OR ASSISTANCE NEEDED (DEVELOPMENTALLY APPROPRIATE)
WALKING_OR_CLIMBING_STAIRS_DIFFICULTY: YES
DIFFICULTY_EATING/SWALLOWING: NO
ADLS_ACUITY_SCORE: 35
BATHING: 1-->ASSISTANCE NEEDED
ADLS_ACUITY_SCORE: 35
FALL_HISTORY_WITHIN_LAST_SIX_MONTHS: YES
NUMBER_OF_TIMES_PATIENT_HAS_FALLEN_WITHIN_LAST_SIX_MONTHS: 8
WALKING_OR_CLIMBING_STAIRS: AMBULATION DIFFICULTY, REQUIRES EQUIPMENT
CONCENTRATING,_REMEMBERING_OR_MAKING_DECISIONS_DIFFICULTY: NO
TRANSFERRING: 1-->ASSISTANCE (EQUIPMENT/PERSON) NEEDED (NOT DEVELOPMENTALLY APPROPRIATE)
TRANSFERRING: 1-->ASSISTANCE (EQUIPMENT/PERSON) NEEDED
TOILETING_ASSISTANCE: TOILETING DIFFICULTY, ASSISTANCE 1 PERSON

## 2023-05-26 NOTE — ED PROVIDER NOTES
History     Chief Complaint   Patient presents with     Wound Infection     HPI  Linda Loredo is a 78 year old female who presents via EMS with complaints of possibly worsening ulcers on her bottom and possible worsening infection.  Patient has a past medical history significant for type 2 diabetes, morbid obesity, bilateral lower extremity edema, chronic venous stasis ulcers on both lower extremities, chronic pain, hyperparathyroidism, hypertension, PAF on Coumadin and COPD.  Patient was just discharged from the hospital on May 8, patient was in for infected ulcers on her bottom and sepsis.  Patient was discharged from the hospital to the [facility] for follow-up care.  Patient was discharged on cefazolin.  Patient had elevated blood sugars earlier today up to around  the 600s.  Patient was given some insulin and now it is normalized.  They also noticed at the nursing home that the patient oxygen levels were low earlier but they seem to be better now.  They have been concerned at the nursing home that her buttocks ulcers were looking much better but now they are starting to look infected again.  Patient had a catheter placed when she was just in the hospital and it has not  been changed since it was placed.  They have been noticing some dark urine in there.  There are some question of blood because patient is on Coumadin.    Patient denies feeling lightheaded or dizzy.  Denies any URI-like symptoms.  Denies any chest pain or palpitations.  Nothing makes his symptoms better or worse.    Allergies:  Allergies   Allergen Reactions     Prednisone Nausea and Vomiting     Morphine Nausea and Vomiting     Morphine [Fumaric Acid] Nausea and Vomiting     Pregabalin Unknown     Seasonal Allergies Difficulty breathing       Problem List:    Patient Active Problem List    Diagnosis Date Noted     Cellulitis of buttock 05/26/2023     Priority: Medium     Sepsis without acute organ dysfunction, due to unspecified  organism (H) 05/26/2023     Priority: Medium     Abscess of left foot 10/31/2022     Priority: Medium     Vitamin D deficiency 09/08/2022     Priority: Medium     Anticoagulation monitoring, INR range 2-3 07/06/2022     Priority: Medium     Paroxysmal atrial fibrillation (H) 05/30/2022     Priority: Medium     Formatting of this note might be different from the original.  New onset during 5/2022 admission       Diabetic ulcer of left heel associated with type 2 diabetes mellitus, with muscle involvement without evidence of necrosis (H) 05/17/2022     Priority: Medium     Obesity, Class III, BMI 40-49.9 (morbid obesity) (H) 05/05/2022     Priority: Medium     Venous stasis ulcers of both lower extremities (H) 01/13/2022     Priority: Medium     Bilateral lower extremity edema 05/29/2019     Priority: Medium     Systolic murmur 05/29/2019     Priority: Medium     Umbilical hernia without obstruction and without gangrene 12/13/2018     Priority: Medium     Microalbuminuria due to type 2 diabetes mellitus (H) 10/25/2016     Priority: Medium     Osteopenia 05/20/2015     Priority: Medium     Primary hyperparathyroidism (H) 01/23/2013     Priority: Medium     Formatting of this note might be different from the original.  work up January 2013 Dr. Villareal       Hypotension 10/27/2011     Priority: Medium     Fibromyalgia 10/20/2011     Priority: Medium     Anxiety 10/20/2011     Priority: Medium     Chronic kidney disease 10/20/2011     Priority: Medium     Problem list name updated by automated process. Provider to review       Elevated liver enzymes 10/20/2011     Priority: Medium     Fracture of fibula, distal, left, closed 10/20/2011     Priority: Medium     ORIF 10/13/11       COPD (chronic obstructive pulmonary disease) (H) 04/26/2010     Priority: Medium     Chronic pain 04/26/2010     Priority: Medium     Allergic rhinitis 04/08/2008     Priority: Medium     Major depressive disorder, recurrent episode, moderate  (H) 2008     Priority: Medium     Mixed hyperlipidemia due to type 2 diabetes mellitus (H) 2008     Priority: Medium     Formatting of this note is different from the original.    22 ASCVD 10 year risk: 37.9%     Last Lipids:  Last Lipids:  Chol: 2021 130   63  HDL: 2021 46  Non-HDL: 2021 84  Chol/HDL Ratio: 2021 2.83  LDL DIRECT: . No results found in past 5 years   LDL CHOLESTEROL (mg/dL)   Date Value   2021 71     22   The 10-year ASCVD risk score (Teddy SMITH Jr., et al., 2013) is: 37.9%    Values used to calculate the score:      Age: 77 years      Sex: Female      Is Non- : No      Diabetic: Yes      Tobacco smoker: No      Systolic Blood Pressure: 118 mmHg      Is BP treated: Yes      HDL Cholesterol: 46 mg/dL      Total Cholesterol: 130 mg/dL       Hypersomnia with sleep apnea 10/23/2007     Priority: Medium     Sleep study 2004 showing severe OBSTRUCTIVE SLEEP APNEA and recommend CPAP  Problem list name updated by automated process. Provider to review       Generalized osteoarthrosis, unspecified site 2006     Priority: Medium     2007: Linda felt that Naprosyn was not helpful.       Routine general medical examination at a health care facility 2006     Priority: Medium     Mammogram:  Mammogram due 07.  Pap: 2007 done.  Colonoscopy: scheduled .  Lipids: 2006: CHOL 132, HDL  40,LDL 77,TRIG 78,  Influenza vaccine: 10/06  Pneuovax  Vaccine:   Adacel  Vaccine:        Diabetic polyneuropathy associated with type 2 diabetes mellitus (H) 2006     Priority: Medium     2007: on gabapentin.  She has been switched from gabapentin to lyrica.  Problem list name updated by automated process. Provider to review        OBESITY 2006     Priority: Medium     2007: Body mass index is 48.07 kg/(m^2).        Mixed hyperlipidemia 2005      Priority: Medium     Lipids: 12/22/2006: CHOL 132, HDL  40,LDL 77,TRIG 78,  February 26, 2007: taking: Zetia and atorvastatin.       Insomnia 07/15/2005     Priority: Medium     February 26, 2007: see by Dr. Car. On ambien and trazadone.  Problem list name updated by automated process. Provider to review       Herpes zoster 06/20/2005     Priority: Medium     February 26, 2007: On gabapentin and lidoderm.   She has been switched from gabapentin to lyrica.  Problem list name updated by automated process. Provider to review       DEPRESSION 05/09/2005     Priority: Medium     February 26, 2007: Followed by Dr. Car. currently on: sertraline, bupropion, trazadone and ambilify.  2/28/08: Margaret was asked to speak with her psychiatrist about her psychiatric medications.       Essential hypertension with goal blood pressure less than 140/90 05/09/2005     Priority: Medium     Cardiology referral:   February 26, 2007: taking: losartan, lisinopril, labetalol, aspirin, and furosemide.  Problem list name updated by automated process. Provider to review    Formatting of this note is different from the original.    BP Readings from Last 1 Encounters:   07/11/22 118/66     CREATININE (mg/dL)   Date Value   06/04/2022 0.89     ALBUMIN TO CREATININE RATIO,RAND UR      (mg/g creat)   Date Value   11/12/2009 25.7       combination of stress and urge URINARY INCONTINENCE 05/09/2005     Priority: Medium     February 26, 2007: On Detrol LA.       Type 2 diabetes mellitus with complication, with long-term current use of insulin (H) 04/18/2005     Priority: Medium     11/05: ophthalmology=Dr. German, exam 11/05 no evidence of diabetic retinopathy.  February 26, 2007: Poor control A1C      8.6   12/22/2006.   Medications: insulin pump: Humulog (Basal rate is 1.8 units and 1/2 usual meal bolus of insulin), Byetta, metformin. A CGMS (of insulin pump) is currently being done with diabetic educator Rosemary Pendleton.    Endocrine Consult at  [facility]: see scanned notes for details. Linda has not followed up with them as of yet.  Problem list name updated by automated process. Provider to review    Formatting of this note is different from the original.  diagnosis 2001    HEMOGLOBIN A1C MONITORING (POCT) (%)   Date Value   05/28/2022 7.4 (H)   06/17/2021 7.1 (H)          Past Medical History:    Past Medical History:   Diagnosis Date     Depressive disorder, not elsewhere classified      Herpes zoster without mention of complication      Hypercalcemia 2/24/2007     Mild intermittent asthma      Other urinary incontinence      Type II or unspecified type diabetes mellitus with renal manifestations, uncontrolled(250.42) (H)      Type II or unspecified type diabetes mellitus without mention of complication, not stated as uncontrolled      Unspecified essential hypertension        Past Surgical History:    Past Surgical History:   Procedure Laterality Date     CHOLECYSTECTOMY       ligation of fallopian tube       OPEN REDUCTION INTERNAL FIXATION ANKLE  10/13/11    left     pinning of left 2nd toe         Family History:    Family History   Problem Relation Age of Onset     Hypertension Mother      Heart Disease Father         heart attack and cardiomegaly     Diabetes Sister         type 2     Hypertension Sister      Respiratory Sister      Psychotic Disorder Sister         bipolar     Cancer Maternal Grandmother         throat     Cancer Paternal Grandmother         gastric       Social History:  Marital Status:   [4]  Social History     Tobacco Use     Smoking status: Never     Smokeless tobacco: Never   Substance Use Topics     Alcohol use: No     Drug use: No        Medications:    HUMALOG 100 UNIT/ML SC SOLN  INSULIN PUMP ACCESSORIES MISC  Lidocaine (LIDOCARE) 4 % Patch  lisinopril (ZESTRIL) 10 MG tablet  metFORMIN (GLUCOPHAGE XR) 500 MG 24 hr tablet  METHADONE HCL 5 MG OR TABS  ONE TOUCH TEST STRIPS TEST   VI  oxybutynin (DITROPAN) 5 MG  tablet  pregabalin (LYRICA) 50 MG capsule  simvastatin (ZOCOR) 20 MG tablet  torsemide (DEMADEX) 20 MG tablet  Warfarin Therapy Reminder          Review of Systems   All other systems reviewed and are negative.      Physical Exam   BP: 114/85  Pulse: 111  Temp: 97.9  F (36.6  C)  Resp: 20  Weight: 119 kg (262 lb 6.4 oz)  SpO2: 100 %      Physical Exam  Vitals and nursing note reviewed.   Constitutional:       General: She is not in acute distress.     Appearance: She is well-developed. She is not diaphoretic.   Eyes:      Conjunctiva/sclera: Conjunctivae normal.   Cardiovascular:      Rate and Rhythm: Normal rate and regular rhythm.      Heart sounds: Normal heart sounds. No murmur heard.     No friction rub. No gallop.   Pulmonary:      Effort: Pulmonary effort is normal. No respiratory distress.      Breath sounds: Normal breath sounds. No wheezing or rales.   Chest:      Chest wall: No tenderness.   Abdominal:      General: Bowel sounds are normal. There is no distension.      Palpations: Abdomen is soft. There is no mass.      Tenderness: There is no abdominal tenderness. There is no guarding.   Musculoskeletal:         General: No tenderness. Normal range of motion.      Cervical back: Normal range of motion and neck supple.   Skin:     General: Skin is warm and dry.      Findings: Lesion and rash present.      Comments: Multiple pressure ulcers on the bottom with skin excoriation.  Please see the pictures below.  There is also redness of the legs that appear to be marked, the erythema does not seem to extend past the marking.   Neurological:      Mental Status: She is alert and oriented to person, place, and time.   Psychiatric:         Judgment: Judgment normal.                 ED Course                 Procedures    }  The patient has signs of Severe Sepsis        If one the following conditions is present, a 30 mL/kg bolus is recommended as part of the 6 hour bundle (IBW can be used for BMI >30, or document  "refusal/contraindication):      1.   Initial hypotension  defined as 2 bps < 90 or map < 65 in the 6hrs before or 3hrs after time zero.     2.  Lactate >4.      The patient has signs of Severe Sepsis as evidenced by:    1. 2 SIRS criteria, AND  2. Suspected infection, AND   3. Organ dysfunction: Lactic Acidosis with value >2.0 and Acute coagulopathy with INR >1.5 due to infection    Time severe sepsis diagnosis confirmed: 3pm  05/26/23 as this was the time when Lactate resulted, and the level was > 2.0    3 Hour Severe Sepsis Bundle Completion:    1. Initial Lactic Acid Result:   Recent Labs   Lab Test 05/26/23  1508   LACT 4.4*     2. Blood Cultures before Antibiotics: Yes  3. Broad Spectrum Antibiotics Administered:  yes       Anti-infectives (From admission through now)    Start     Dose/Rate Route Frequency Ordered Stop    05/26/23 1600  vancomycin (VANCOCIN) 1,500 mg in sodium chloride 0.9 % 250 mL intermittent infusion         1,500 mg  over 90 Minutes Intravenous ONCE 05/26/23 1533      05/26/23 1530  piperacillin-tazobactam (ZOSYN) 4.5 g vial to attach to  mL bag        Note to Pharmacy: For [facility], [facility] and [facility]: For Zosyn-naive patients, use the \"Zosyn initial dose + extended infusion\" order panel.    4.5 g  over 30 Minutes Intravenous ONCE 05/26/23 1526            4. Is initial hypotension present?     No (IV fluid bolus NOT required). IV Fluid volume administered: 2000             Results for orders placed or performed during the hospital encounter of 05/26/23 (from the past 24 hour(s))   Glucose by meter   Result Value Ref Range    GLUCOSE BY METER POCT 124 (H) 70 - 99 mg/dL   CBC with platelets differential    Narrative    The following orders were created for panel order CBC with platelets differential.  Procedure                               Abnormality         Status                     ---------                               -----------         ------                     CBC with platelets and " dAnastasiia..[[id number]]                      Final result                 Please view results for these tests on the individual orders.   Comprehensive metabolic panel   Result Value Ref Range    Sodium 141 136 - 145 mmol/L    Potassium 3.7 3.4 - 5.3 mmol/L    Chloride 100 98 - 107 mmol/L    Carbon Dioxide (CO2) 30 (H) 22 - 29 mmol/L    Anion Gap 11 7 - 15 mmol/L    Urea Nitrogen 49.1 (H) 8.0 - 23.0 mg/dL    Creatinine 1.24 (H) 0.51 - 0.95 mg/dL    Calcium 10.9 (H) 8.8 - 10.2 mg/dL    Glucose 65 (L) 70 - 99 mg/dL    Alkaline Phosphatase 90 35 - 104 U/L    AST 43 (H) 10 - 35 U/L    ALT 23 10 - 35 U/L    Protein Total 7.5 6.4 - 8.3 g/dL    Albumin 3.5 3.5 - 5.2 g/dL    Bilirubin Total 0.6 <=1.2 mg/dL    GFR Estimate 44 (L) >60 mL/min/1.73m2   CRP inflammation   Result Value Ref Range    CRP Inflammation 80.57 (H) <5.00 mg/L   Lactic acid whole blood   Result Value Ref Range    Lactic Acid 4.4 (HH) 0.7 - 2.0 mmol/L   Ionized Calcium   Result Value Ref Range    Calcium Ionized 5.4 (H) 4.4 - 5.2 mg/dL   Magnesium   Result Value Ref Range    Magnesium 1.6 (L) 1.7 - 2.3 mg/dL   INR   Result Value Ref Range    INR 5.24 (HH) 0.85 - 1.15    Narrative    Verified by repeat   CBC with platelets and differential   Result Value Ref Range    WBC Count 10.7 4.0 - 11.0 10e3/uL    RBC Count 4.22 3.80 - 5.20 10e6/uL    Hemoglobin 12.5 11.7 - 15.7 g/dL    Hematocrit 38.5 35.0 - 47.0 %    MCV 91 78 - 100 fL    MCH 29.6 26.5 - 33.0 pg    MCHC 32.5 31.5 - 36.5 g/dL    RDW 13.5 10.0 - 15.0 %    Platelet Count 178 150 - 450 10e3/uL    % Neutrophils 77 %    % Lymphocytes 14 %    % Monocytes 8 %    % Eosinophils 1 %    % Basophils 0 %    % Immature Granulocytes 0 %    NRBCs per 100 WBC 0 <1 /100    Absolute Neutrophils 8.1 1.6 - 8.3 10e3/uL    Absolute Lymphocytes 1.5 0.8 - 5.3 10e3/uL    Absolute Monocytes 0.9 0.0 - 1.3 10e3/uL    Absolute Eosinophils 0.1 0.0 - 0.7 10e3/uL    Absolute Basophils 0.0 0.0 - 0.2 10e3/uL    Absolute Immature Granulocytes  0.0 <=0.4 10e3/uL    Absolute NRBCs 0.0 10e3/uL   Procalcitonin   Result Value Ref Range    Procalcitonin 0.15 (H) <0.05 ng/mL   Gideon Draw    Narrative    The following orders were created for panel order Gideon Draw.  Procedure                               Abnormality         Status                     ---------                               -----------         ------                     Extra Blood Culture Bottle[[id number]]                       Final result                 Please view results for these tests on the individual orders.   Extra Blood Culture Bottle   Result Value Ref Range    Hold Specimen JIC    UA with Microscopic reflex to Culture    Specimen: Urine, Braga Catheter   Result Value Ref Range    Color Urine Red (A) Colorless, Straw, Light Yellow, Yellow    Appearance Urine Cloudy (A) Clear    Glucose Urine Negative Negative mg/dL    Bilirubin Urine Negative Negative    Ketones Urine Negative Negative mg/dL    Specific Gravity Urine 1.014 1.003 - 1.035    Blood Urine Large (A) Negative    pH Urine 5.0 5.0 - 7.0    Protein Albumin Urine 100 (A) Negative mg/dL    Urobilinogen Urine Normal Normal, 2.0 mg/dL    Nitrite Urine Negative Negative    Leukocyte Esterase Urine Moderate (A) Negative    Bacteria Urine Few (A) None Seen /HPF    Mucus Urine Present (A) None Seen /LPF    RBC Urine >182 (H) <=2 /HPF    WBC Urine 88 (H) <=5 /HPF    Squamous Epithelials Urine 3 (H) <=1 /HPF    Narrative    Urine Culture ordered based on laboratory criteria   Glucose by meter   Result Value Ref Range    GLUCOSE BY METER POCT 71 70 - 99 mg/dL       Medications   0.9% sodium chloride BOLUS (has no administration in time range)     Followed by   0.9% sodium chloride BOLUS (has no administration in time range)     Followed by   sodium chloride 0.9% infusion (has no administration in time range)   piperacillin-tazobactam (ZOSYN) 4.5 g vial to attach to  mL bag ( Intravenous Canceled Entry 5/26/23 5975)    vancomycin (VANCOCIN) 1,500 mg in sodium chloride 0.9 % 250 mL intermittent infusion (has no administration in time range)   lidocaine (XYLOCAINE) 2 % external gel (has no administration in time range)   methadone (DOLOPHINE) tablet 10 mg (has no administration in time range)     This is a 78-year-old female who presents with signs and symptoms consistent with sepsis likely from the infected pressure ulcers on the gluteal area and a cellulitis of the gluteus and lower leg areas.  Please see the pictures above for this.  White count was normal but lactic was elevated and procalcitonin was slightly elevated.  I did change the patient's catheter as this has been in for more than 3 weeks and a new urine was run off the new catheter and it does look infected but this could be more from the chronic Braga placement.  Urine culture is pending.  We had a hard time getting IV access and patient was poked multiple multiple times.  We did have an IV for a while and then lost it.  We will order a PICC stat to get placement.  Patient was started and I believe given  The Zosyn and vancomycin.  Patient has never been hypotensive, and because of her age I was not super aggressive with the fluid hydration.   Patient's INR was also elevated, this could be because of the patient's infection also.  Patient has no signs or symptoms of bleeding, will continue to monitor this.    Discussed the case with our hospitalist, Dr. Coles who will accept the patient  .  Assessments & Plan (with Medical Decision Making)  Sepsis, cellulitis of the buttocks pressure ulcers     I have reviewed the nursing notes.    I have reviewed the findings, diagnosis, plan and need for follow up with the patient.    5/26/2023   [facility] EMERGENCY DEPT     Pepe Schrader MD  05/26/23 6800

## 2023-05-26 NOTE — ED TRIAGE NOTES
Pt is here with increase to wounds and pain of her buttock and legs, with increased drainage reported and also having very dark urine in jung bag, per staff at elim pt had a blood sugar over 600 and with inulin 18 units it was down to 174, also a report of low oxygen levels but those are in the high 90's as well       She can come in early for her blood work.  As long as she is coming in, we should do all of her lab work.

## 2023-05-26 NOTE — LETTER
Transition Communication Hand-off for Care Transitions to Next Level of Care Provider    Name: Linda Loredo  : 1945  MRN #: [id number]  Primary Care Provider: Ish Brown     Primary Clinic: [facility] [address] [facility] [address]     Reason for Hospitalization:  Cellulitis of buttock [L03.317]  Pressure injury of skin of buttock, unspecified injury stage, unspecified laterality [L89.309]  Sepsis without acute organ dysfunction, due to unspecified organism (H) [A41.9]  Admit Date/Time: 2023  2:04 PM  Discharge Date: 23  Payor Source: Payor: [facility] / Plan: ARE MEDICARE / Product Type: HMO /     Readmission Assessment Measure (RUEL) Risk Score/category: Low Risk          Reason for Communication Hand-off Referral: Other [facility] [place]     Discharge Plan:  Discharge Plan:      Flowsheet Row Most Recent Value   Disposition Comments Accepted to [facility]   Concerns Comments Patient refusing to return back to [facility]. Did not feel she received adequate care. Requested alternative TCU placement             Concern for non-adherence with plan of care:   Y/N No   Discharge Needs Assessment:  Needs      Flowsheet Row Most Recent Value   Equipment Currently Used at Home walker, sarabjit   # of Referrals Placed by CM External Care Coordination, Post Acute Facilities            Already enrolled in Tele-monitoring program and name of program:  No   Follow-up specialty is recommended: Yes- Wound Care     Follow-up plan:  No future appointments.    Any outstanding tests or procedures:        Referrals       Future Labs/Procedures    Wound Care Referral     Process Instructions:    Wound care services don't provide routine dressing changes. If this is for a surgical wound, a consult request should come from surgeon rather than PCP (unless surgeon is already aware).    Comments:    Please be aware that coverage of these  services is subject to the terms and limitations of your health insurance plan.  Call member services at your health plan with any benefit or coverage questions.  Please call to schedule your appointment      Occupational Therapy Adult Consult     Comments:    Evaluate and treat as clinically indicated.    Reason:  generalized weakness, numerous buttocks/perineal and leg wounds    Physical Therapy Adult Consult     Comments:    Evaluate and treat as clinically indicated.    Reason:  generalized weakness, numerous buttocks/perineal and leg wounds            LIZY Pierce  [facility]   [phone]       AVS/Discharge Summary is the source of truth; this is a helpful guide for improved communication of patient story

## 2023-05-27 PROBLEM — R79.1 SUPRATHERAPEUTIC INR: Status: ACTIVE | Noted: 2023-05-27

## 2023-05-27 PROBLEM — D68.32 WARFARIN-INDUCED COAGULOPATHY (H): Status: ACTIVE | Noted: 2023-05-27

## 2023-05-27 PROBLEM — E16.2 HYPOGLYCEMIA: Status: ACTIVE | Noted: 2023-05-27

## 2023-05-27 PROBLEM — A41.9 SEPSIS (H): Status: RESOLVED | Noted: 2023-05-26 | Resolved: 2023-05-27

## 2023-05-27 PROBLEM — T45.515A WARFARIN-INDUCED COAGULOPATHY (H): Status: ACTIVE | Noted: 2023-05-27

## 2023-05-27 PROBLEM — L03.90 CELLULITIS: Status: ACTIVE | Noted: 2023-05-27

## 2023-05-27 PROBLEM — L89.90 DECUBITUS ULCERS: Status: ACTIVE | Noted: 2023-05-27

## 2023-05-27 PROBLEM — I48.0 PAROXYSMAL ATRIAL FIBRILLATION (H): Status: ACTIVE | Noted: 2022-05-30

## 2023-05-27 LAB
ALBUMIN SERPL BCG-MCNC: 2.3 G/DL (ref 3.5–5.2)
ALP SERPL-CCNC: 61 U/L (ref 35–104)
ALT SERPL W P-5'-P-CCNC: 15 U/L (ref 10–35)
ANION GAP SERPL CALCULATED.3IONS-SCNC: 7 MMOL/L (ref 7–15)
AST SERPL W P-5'-P-CCNC: 33 U/L (ref 10–35)
BILIRUB SERPL-MCNC: 0.4 MG/DL
BUN SERPL-MCNC: 41.6 MG/DL (ref 8–23)
CALCIUM SERPL-MCNC: 9.2 MG/DL (ref 8.8–10.2)
CHLORIDE SERPL-SCNC: 105 MMOL/L (ref 98–107)
CREAT SERPL-MCNC: 1.16 MG/DL (ref 0.51–0.95)
CRP SERPL-MCNC: 66.07 MG/L
DEPRECATED HCO3 PLAS-SCNC: 29 MMOL/L (ref 22–29)
ERYTHROCYTE [DISTWIDTH] IN BLOOD BY AUTOMATED COUNT: 13.6 % (ref 10–15)
GFR SERPL CREATININE-BSD FRML MDRD: 48 ML/MIN/1.73M2
GLUCOSE BLDC GLUCOMTR-MCNC: 131 MG/DL (ref 70–99)
GLUCOSE BLDC GLUCOMTR-MCNC: 142 MG/DL (ref 70–99)
GLUCOSE BLDC GLUCOMTR-MCNC: 151 MG/DL (ref 70–99)
GLUCOSE BLDC GLUCOMTR-MCNC: 182 MG/DL (ref 70–99)
GLUCOSE BLDC GLUCOMTR-MCNC: 186 MG/DL (ref 70–99)
GLUCOSE BLDC GLUCOMTR-MCNC: 211 MG/DL (ref 70–99)
GLUCOSE SERPL-MCNC: 152 MG/DL (ref 70–99)
HBA1C MFR BLD: 8.2 %
HCT VFR BLD AUTO: 31.7 % (ref 35–47)
HGB BLD-MCNC: 10.2 G/DL (ref 11.7–15.7)
HGB BLD-MCNC: 10.4 G/DL (ref 11.7–15.7)
INR PPP: 6.83 (ref 0.85–1.15)
MAGNESIUM SERPL-MCNC: 1.5 MG/DL (ref 1.7–2.3)
MAGNESIUM SERPL-MCNC: 2.1 MG/DL (ref 1.7–2.3)
MCH RBC QN AUTO: 29.7 PG (ref 26.5–33)
MCHC RBC AUTO-ENTMCNC: 32.2 G/DL (ref 31.5–36.5)
MCV RBC AUTO: 92 FL (ref 78–100)
PLATELET # BLD AUTO: 124 10E3/UL (ref 150–450)
POTASSIUM SERPL-SCNC: 3.6 MMOL/L (ref 3.4–5.3)
PROCALCITONIN SERPL IA-MCNC: 0.16 NG/ML
PROT SERPL-MCNC: 5.2 G/DL (ref 6.4–8.3)
RBC # BLD AUTO: 3.43 10E6/UL (ref 3.8–5.2)
SODIUM SERPL-SCNC: 141 MMOL/L (ref 136–145)
WBC # BLD AUTO: 6.9 10E3/UL (ref 4–11)

## 2023-05-27 PROCEDURE — 86140 C-REACTIVE PROTEIN: CPT | Performed by: FAMILY MEDICINE

## 2023-05-27 PROCEDURE — 85018 HEMOGLOBIN: CPT | Performed by: FAMILY MEDICINE

## 2023-05-27 PROCEDURE — 84145 PROCALCITONIN (PCT): CPT | Performed by: FAMILY MEDICINE

## 2023-05-27 PROCEDURE — 258N000003 HC RX IP 258 OP 636: Performed by: INTERNAL MEDICINE

## 2023-05-27 PROCEDURE — 250N000011 HC RX IP 250 OP 636: Performed by: FAMILY MEDICINE

## 2023-05-27 PROCEDURE — 83735 ASSAY OF MAGNESIUM: CPT | Performed by: INTERNAL MEDICINE

## 2023-05-27 PROCEDURE — 250N000013 HC RX MED GY IP 250 OP 250 PS 637: Performed by: FAMILY MEDICINE

## 2023-05-27 PROCEDURE — 258N000003 HC RX IP 258 OP 636: Performed by: FAMILY MEDICINE

## 2023-05-27 PROCEDURE — 200N000001 HC R&B ICU

## 2023-05-27 PROCEDURE — 83036 HEMOGLOBIN GLYCOSYLATED A1C: CPT | Performed by: FAMILY MEDICINE

## 2023-05-27 PROCEDURE — 99233 SBSQ HOSP IP/OBS HIGH 50: CPT | Performed by: FAMILY MEDICINE

## 2023-05-27 PROCEDURE — 85610 PROTHROMBIN TIME: CPT | Performed by: INTERNAL MEDICINE

## 2023-05-27 PROCEDURE — 80053 COMPREHEN METABOLIC PANEL: CPT | Performed by: INTERNAL MEDICINE

## 2023-05-27 PROCEDURE — 250N000013 HC RX MED GY IP 250 OP 250 PS 637: Performed by: INTERNAL MEDICINE

## 2023-05-27 PROCEDURE — 250N000011 HC RX IP 250 OP 636: Performed by: INTERNAL MEDICINE

## 2023-05-27 PROCEDURE — 85027 COMPLETE CBC AUTOMATED: CPT | Performed by: INTERNAL MEDICINE

## 2023-05-27 RX ORDER — NALOXONE HYDROCHLORIDE 0.4 MG/ML
0.2 INJECTION, SOLUTION INTRAMUSCULAR; INTRAVENOUS; SUBCUTANEOUS
Status: DISCONTINUED | OUTPATIENT
Start: 2023-05-27 | End: 2023-05-31 | Stop reason: HOSPADM

## 2023-05-27 RX ORDER — METHADONE HYDROCHLORIDE 10 MG/1
10 TABLET ORAL DAILY
Status: DISCONTINUED | OUTPATIENT
Start: 2023-05-27 | End: 2023-05-27

## 2023-05-27 RX ORDER — NALOXONE HYDROCHLORIDE 0.4 MG/ML
0.4 INJECTION, SOLUTION INTRAMUSCULAR; INTRAVENOUS; SUBCUTANEOUS
Status: DISCONTINUED | OUTPATIENT
Start: 2023-05-27 | End: 2023-05-31 | Stop reason: HOSPADM

## 2023-05-27 RX ORDER — MAGNESIUM SULFATE HEPTAHYDRATE 40 MG/ML
4 INJECTION, SOLUTION INTRAVENOUS ONCE
Status: COMPLETED | OUTPATIENT
Start: 2023-05-27 | End: 2023-05-27

## 2023-05-27 RX ORDER — CEFAZOLIN SODIUM/WATER 2 G/20 ML
2 SYRINGE (ML) INTRAVENOUS EVERY 8 HOURS
Status: DISCONTINUED | OUTPATIENT
Start: 2023-05-27 | End: 2023-05-28

## 2023-05-27 RX ORDER — VANCOMYCIN HYDROCHLORIDE 1 G/200ML
1000 INJECTION, SOLUTION INTRAVENOUS EVERY 24 HOURS
Status: DISCONTINUED | OUTPATIENT
Start: 2023-05-27 | End: 2023-05-29

## 2023-05-27 RX ORDER — METHADONE HYDROCHLORIDE 10 MG/1
10 TABLET ORAL DAILY
Status: DISCONTINUED | OUTPATIENT
Start: 2023-05-27 | End: 2023-05-31 | Stop reason: HOSPADM

## 2023-05-27 RX ORDER — PENTOXIFYLLINE 400 MG/1
400 TABLET, EXTENDED RELEASE ORAL
Status: DISCONTINUED | OUTPATIENT
Start: 2023-05-27 | End: 2023-05-31 | Stop reason: HOSPADM

## 2023-05-27 RX ORDER — METHADONE HYDROCHLORIDE 5 MG/1
5 TABLET ORAL 3 TIMES DAILY
Status: DISCONTINUED | OUTPATIENT
Start: 2023-05-27 | End: 2023-05-31 | Stop reason: HOSPADM

## 2023-05-27 RX ADMIN — METHADONE HYDROCHLORIDE 5 MG: 5 TABLET ORAL at 12:15

## 2023-05-27 RX ADMIN — PIPERACILLIN AND TAZOBACTAM 3.38 G: 3; .375 INJECTION, POWDER, FOR SOLUTION INTRAVENOUS at 01:00

## 2023-05-27 RX ADMIN — FAMOTIDINE 20 MG: 10 INJECTION, SOLUTION INTRAVENOUS at 21:25

## 2023-05-27 RX ADMIN — SODIUM CHLORIDE 1000 ML: 9 INJECTION, SOLUTION INTRAVENOUS at 00:54

## 2023-05-27 RX ADMIN — PENTOXIFYLLINE 400 MG: 400 TABLET, EXTENDED RELEASE ORAL at 17:03

## 2023-05-27 RX ADMIN — VANCOMYCIN HYDROCHLORIDE 1000 MG: 1 INJECTION, SOLUTION INTRAVENOUS at 19:03

## 2023-05-27 RX ADMIN — SIMVASTATIN 20 MG: 20 TABLET, FILM COATED ORAL at 21:24

## 2023-05-27 RX ADMIN — SODIUM CHLORIDE, POTASSIUM CHLORIDE, SODIUM LACTATE AND CALCIUM CHLORIDE 500 ML: 600; 310; 30; 20 INJECTION, SOLUTION INTRAVENOUS at 05:23

## 2023-05-27 RX ADMIN — PIPERACILLIN AND TAZOBACTAM 3.38 G: 3; .375 INJECTION, POWDER, FOR SOLUTION INTRAVENOUS at 05:58

## 2023-05-27 RX ADMIN — FAMOTIDINE 20 MG: 10 INJECTION, SOLUTION INTRAVENOUS at 10:50

## 2023-05-27 RX ADMIN — MICONAZOLE NITRATE: 20 POWDER TOPICAL at 12:17

## 2023-05-27 RX ADMIN — MICONAZOLE NITRATE: 20 POWDER TOPICAL at 21:25

## 2023-05-27 RX ADMIN — SODIUM CHLORIDE: 9 INJECTION, SOLUTION INTRAVENOUS at 23:11

## 2023-05-27 RX ADMIN — Medication 2 G: at 19:03

## 2023-05-27 RX ADMIN — SODIUM CHLORIDE 1000 ML: 9 INJECTION, SOLUTION INTRAVENOUS at 05:22

## 2023-05-27 RX ADMIN — METHADONE HYDROCHLORIDE 5 MG: 5 TABLET ORAL at 21:25

## 2023-05-27 RX ADMIN — MAGNESIUM SULFATE HEPTAHYDRATE 4 G: 40 INJECTION, SOLUTION INTRAVENOUS at 08:09

## 2023-05-27 RX ADMIN — Medication 2 G: at 12:10

## 2023-05-27 RX ADMIN — SODIUM CHLORIDE: 9 INJECTION, SOLUTION INTRAVENOUS at 12:48

## 2023-05-27 RX ADMIN — METHADONE HYDROCHLORIDE 10 MG: 10 TABLET ORAL at 14:54

## 2023-05-27 RX ADMIN — PENTOXIFYLLINE 400 MG: 400 TABLET, EXTENDED RELEASE ORAL at 12:15

## 2023-05-27 ASSESSMENT — ACTIVITIES OF DAILY LIVING (ADL)
ADLS_ACUITY_SCORE: 35

## 2023-05-27 NOTE — MEDICATION SCRIBE - ADMISSION MEDICATION HISTORY
Medication Scribe Admission Medication History    Admission medication history is complete. The information provided in this note is only as accurate as the sources available at the time of the update.    Medication reconciliation/reorder completed by provider prior to medication history? No    Information Source(s): Facility ([facility]/) medication list/MAR and  [facility] staff member, Patience and  via phone [phone]    Pertinent Information: Called [facility] staff to verify additional medications not on MAR that was sent in with patient. Per staff member there, all Warfarin therapy has been on hold since 05/24/23 due to INR reading at high at about 6. Plans to restart Warfarin is dependant on INR reading that was supposed to be drawn today but patient is now at [facility]. Last doses for Warfarin has been documented in PTA med list.     Changes made to PTA medication list:    Added: Insulin Aspart (novolog pen) as per outside source & [facility] MAR    Nalaxone 4mg as per outside source & [facility] MAR    Pentoxifylline 400 mg as per outside source & [facility] MAR    Warfarin 2.5 mg and 3 mg both added as per MAR & report from [facility]; order on hold but added anyway to indicate last dose and why order on hold     Deleted: None     Changed: Torsemide 20 mg tablet, take 40 mg BID changed to 10 mg tablet, take 10 mg BID as per [facility] MAR     Medication Affordability:  Not including over the counter (OTC) medications, was there a time in the past 3 months when you did not take your medications as prescribed because of cost?: Yes (patient reports unable to afford insulin and has resorted to charging on credit card just to keep up)    Allergies reviewed with patient and updates made in EHR: yes - removed allergy/intoleramce to Pregablin as per patient report of not ever being allergic or intolerant and unknown as why it was ever added to allergy list     Medication History Completed By: CINDY HERRERA 5/26/2023 8:00 PM    Prior to  Admission medications    Medication Sig Last Dose Taking? Auth Provider Long Term End Date   insulin aspart (NOVOLOG PEN) 100 UNIT/ML pen Inject Subcutaneous 3 times daily Sliding Scale:  150-199= 3 Units 200-249= 6 Units  250-299= 9 Units 300-349= 12 Units 350-399= 15 Units >399= 18 Units & call MD  Yes Reported, Patient     lisinopril (ZESTRIL) 10 MG tablet Take 10 mg by mouth daily 5/26/2023 at 0809 Yes Reported, Patient     menthol-zinc oxide (CALMOSEPTINE) 0.44-20.6 % OINT ointment Apply topically 4 times daily as needed  Yes Reported, Patient     metFORMIN (GLUCOPHAGE XR) 500 MG 24 hr tablet Take 2,000 mg by mouth daily (with dinner) 5/25/2023 at 1558 Yes Reported, Patient     methadone (DOLOPHINE) 5 MG tablet Take 10 mg by mouth daily 5/25/2023 at 1558 Yes Reported, Patient     METHADONE HCL 5 MG OR TABS Take 5 mg by mouth 3 times daily 5/26/2023 at 1219 Yes Reported, Patient     naloxone (NARCAN) 4 MG/0.1ML nasal spray Spray 1 spray in nostril  Yes Reported, Patient     ONE TOUCH TEST STRIPS TEST   VI use as directed  Patient taking differently: 1 strip by In Vitro route 4 times daily ACHS 5/26/2023 at 612 Yes Eloisa Maloney MD     pentoxifylline ER (TRENTAL) 400 MG CR tablet Take 400 mg by mouth 3 times daily (with meals) 5/26/2023 at 0809 Yes Reported, Patient Yes    simvastatin (ZOCOR) 20 MG tablet Take 20 mg by mouth At Bedtime 5/25/2023 at 2015 Yes Reported, Patient     torsemide (DEMADEX) 10 MG tablet Take 10 mg by mouth 2 times daily 5/26/2023 at 0809 Yes Reported, Patient     warfarin ANTICOAGULANT (COUMADIN) 3 MG tablet Take 3 mg by mouth Sunday Tuesday Thursday Saturday 5/23/2023 at 1639 Yes Reported, Patient     HUMALOG 100 UNIT/ML SC SOLN Basal rate 5 units; bolus with 1-5 units TID with meals based on BG  Patient not taking: Reported on 5/26/2023 Not Taking  Reported, Patient     INSULIN PUMP ACCESSORIES MISC as directed  Patient not taking: Reported on 5/26/2023 Not Taking  MicallefEloisa  MD     Lidocaine (LIDOCARE) 4 % Patch Place 1 patch onto the skin every 24 hours To prevent lidocaine toxicity, patient should be patch free for 12 hrs daily. Right knee  Patient not taking: Reported on 5/26/2023 Not Taking  Zulma Molina APRN CNP     oxybutynin (DITROPAN) 5 MG tablet Take 5 mg by mouth 2 times daily as needed for bladder spasms  Patient not taking: Reported on 5/26/2023 Not Taking  Reported, Patient     pregabalin (LYRICA) 50 MG capsule Take 50 mg by mouth 3 times daily as needed  Patient not taking: Reported on 5/26/2023 Not Taking  Reported, Patient     Warfarin Therapy Reminder Take 1 each by mouth See Admin Instructions See facility orders for current dose. Next INR 11/10/22   Reported, Patient

## 2023-05-27 NOTE — PROCEDURES
[facility]    Double Lumen PICC Placement    Date/Time: 5/26/2023 8:30 PM    Performed by: Daphney Chappell RN  Authorized by: Roderick Grace MD  Indications: vascular access      UNIVERSAL PROTOCOL   Site Marked: Yes  Prior Images Obtained and Reviewed:  Yes  Required items: Required blood products, implants, devices and special equipment available    Patient identity confirmed:  Verbally with patient  NA - No sedation, light sedation, or local anesthesia  Confirmation Checklist:  Patient's identity using two indicators  Time out: Immediately prior to the procedure a time out was called    Universal Protocol: the Joint Commission Universal Protocol was followed    Preparation: Patient was prepped and draped in usual sterile fashion       ANESTHESIA    Anesthesia: See MAR for details  Local Anesthetic:  Lidocaine 1% without epinephrine      SEDATION    Patient Sedated: No        Preparation: skin prepped with ChloraPrep  Skin prep agent: skin prep agent completely dried prior to procedure  Sterile barriers: maximum sterile barriers were used: cap, mask, sterile gown, sterile gloves, and large sterile sheet  Hand hygiene: hand hygiene performed prior to central venous catheter insertion  Type of line used: PICC  Catheter type: double lumen  Lumen type: valved and power PICC  Lumen Identification: Purple and Red  Catheter size: 5 Fr  Brand: Bard  Lot number: [id number]  Placement method: venipuncture, MST and ultrasound  Number of attempts: 1  Difficulty threading catheter: no  Successful placement: yes  Orientation: right  Catheter to Vein (%): 28  Location: basilic vein  Tip Location: SVC/RA Junction  Arm circumference: adults 10 cm  Extremity circumference: 33  Visible catheter length: 0  Total catheter length: 44  Internal Lumen Volume: 20 mL    Dressing and securement: adhesive securement device, antibiotic disc placed, statlock and transparent dressing  Post procedure assessment:  blood return through all ports and placement verified by x-ray  PROCEDURE   Patient Tolerance:  Patient tolerated the procedure well with no immediate complicationsDescribe Procedure: Consulted for PICC line. Noted INR: 5.24 & Blood culture pending. Roderick Francis Yang (ER provider) gave ok to place PICC line due to urgency and difficult vascular access. Placed 5F Double-Lumen PICC. Pt tolerated well. Blood return noted and flushes well. Radiologist Dr. Brunner, [place]) confirmed the tip in the CA junction. No complications noted. Ok to use.   Disposal: sharps and needle count correct at the end of procedure, needles and guidewire disposed in sharps container

## 2023-05-27 NOTE — PLAN OF CARE
"Goal Outcome Evaluation:      Plan of Care Reviewed With: patient, child    Overall Patient Progress: no changeOverall Patient Progress: no change    Outcome Evaluation: Pt with soft Bp's ovenright.  TOtal of 3.5 L given between ED ICU.  See flowsheets for wound documentation.  Dressings changed.  Braga with adequate UOP, Dark Nettie with sdiment.  Pt rests comfortably between cares, Frequent O2 desats, pt refused to wear oxygen overnight, hx of BERLIN and COPD.  Interdry in ABD/Groin folds. See pictures in ED provider note.  -170 overnight.  PICC line infusing.  3+ BLE Edema.  not OOB overnight,  Mag to be replaced and re-check at 1245 today    /54   Pulse 98   Temp 97.9  F (36.6  C) (Oral)   Resp 12   Ht 1.676 m (5' 6\")   Wt 119 kg (262 lb 6.4 oz)   SpO2 (!) 89%   BMI 42.35 kg/m     "

## 2023-05-27 NOTE — PHARMACY-ANTICOAGULATION SERVICE
Clinical Pharmacy - Warfarin Dosing Consult     Pharmacy has been consulted to manage this patient s warfarin therapy.  Indication: Atrial Fibrillation  Therapy Goal: INR 2-3  Warfarin Prior to Admission: Yes  Warfarin PTA Regimen: 2.5 mg on Mon, Wed, Fri and 3 mg all other days of the week  Significant drug interactions: Zosyn    INR   Date Value Ref Range Status   05/27/2023 6.83 (HH) 0.85 - 1.15 Final   05/26/2023 5.24 (HH) 0.85 - 1.15 Final     Warfarin has been on hold prior to admission - last dose on 5/23    Recommend to HOLD warfarin today.  Pharmacy will monitor Linda Loredo daily and order warfarin doses to achieve specified goal.      Please contact pharmacy as soon as possible if the warfarin needs to be held for a procedure or if the warfarin goals change.

## 2023-05-27 NOTE — H&P
[facility]    History and Physical - Hospitalist Service       Date of Admission:  5/26/2023    Assessment & Plan      Linda Loredo is a 78 year old female admitted on 5/26/2023 for sepsis, cellulitis, UTI, GUSTAVO, dehydration.    Sepsis. Admit to medical telemetry. Source of infection are likely cellulitis and UTI. Lactic acid 4.4 (note it came down to 2.4 after fluid resuscitation). Continue treatment for he infections and run IVF. Follow up with cultures.    Cellulitis and wound infection. Ulcers in the buttock area. Note patient was recently discharged on outpatient antibiotics for the same diagnosis. Will continue IV zosyn and vancomycin. Will need wound consult when available as we are coming up with the holiday weekend.    UTI. UA came back positive. Will treat with IV antibiotics as above.    GUSTAVO. Cr 1.24 (baseline 0.8). Likely due to dehydration. IVF as above.    Dehydration. IVF.    Supratherapeutic INR. INR 5.24. Hold for now and will have pharmacy consult on coumadin.    HTN. BP soft in the 100s. Hold off home medication in the settings of sepsis until stable.    HLD. Resume home statin.    Hypoglycemia. Patient with Hx of IDDM, BG now 65. Monitor and hypoglycemia protocol for now.    DVT PPx. SCDs.    Code status. Full code.      The patient's care was discussed with the bedside nursing staff        Thad Byrd MD  [facility]  Securely message with the Vocera Web Console (learn more here)  Text page via [employer] Paging/Directory      Visit/Communication Style   Virtual (Video) communication was used to evaluate Linda.  Linda consented to the use of video communication: yes  Video START time: 2300, 5/26/2023  Video STOP time: 2330, 5/26/2023   Patient's location: [facility]   Provider's location during the visit: [facility]-medicine site         ______________________________________________________________________    Chief Complaint   Wound infection.    History is obtained from the patient    History of Present Illness   Linda Loredo is a 78 year old female with past medical history of IDDM, HTN, HLD, COPD (non Ò dependent), paroxysmal afib (on coumadin), presenting to the Er with complaint of wound infection. Per report, the patient has had ulcers in the bottom, was recently hospitalized earlier this month for sepsis due to that ulcer infection. The patient was discharged on cefazolin. Nurse at the nursing home noticed the ulcers have been looking worse. Otherwise, the patient denies any fever, chills, nausea, vomiting, diarrhea, chest pain, coughing or shortness of breath.    In the ER, the patient was having soft BP in the 100s and tachycardia with HR in the 100s. Labs came back with lactic acid 4.4, Cr 1.24, INR 5.24, UA positive, glucose 65. Zosyn and vancomycin were given in the ER. After fluid was given, lactic acid came down to 2.4    Review of Systems      General: negative for fever, chills, sweats, weakness  Eyes: negative for blurred vision, loss of vision  Ear Nose and Throat: negative for pharyngitis, speech or swallowing difficulties  Respiratory:  negative for sputum production, wheezing, RASHID, pleuritic pain, sob or cough  Cardiology:  negative for chest pain, palpitations, orthopnea, PND, edema, syncope   Gastrointestinal: negative for abdominal pain, nausea, vomiting, diarrhea, constipation, hematemesis, melena or hematochezia  Genitourinary: negative for frequency, urgency, dysuria, hematuria   Neurological: negative for focal weakness, paresthesia    Past Medical History    I have reviewed this patient's medical history and updated it with pertinent information if needed.   Past Medical History:   Diagnosis Date     Depressive disorder, not elsewhere classified      Herpes zoster without mention of complication       Hypercalcemia 2/24/2007     Mild intermittent asthma      Other urinary incontinence      Type II or unspecified type diabetes mellitus with renal manifestations, uncontrolled(250.42) (H)      Type II or unspecified type diabetes mellitus without mention of complication, not stated as uncontrolled      Unspecified essential hypertension        Past Surgical History   I have reviewed this patient's surgical history and updated it with pertinent information if needed.  Past Surgical History:   Procedure Laterality Date     CHOLECYSTECTOMY       ligation of fallopian tube       OPEN REDUCTION INTERNAL FIXATION ANKLE  10/13/11    left     pinning of left 2nd toe         Social History   I have reviewed this patient's social history and updated it with pertinent information if needed.  Social History     Tobacco Use     Smoking status: Never     Smokeless tobacco: Never   Substance Use Topics     Alcohol use: No     Drug use: No       Family History   I have reviewed this patient's family history and updated it with pertinent information if needed.  Family History   Problem Relation Age of Onset     Hypertension Mother      Heart Disease Father         heart attack and cardiomegaly     Diabetes Sister         type 2     Hypertension Sister      Respiratory Sister      Psychotic Disorder Sister         bipolar     Cancer Maternal Grandmother         throat     Cancer Paternal Grandmother         gastric       Prior to Admission Medications   Prior to Admission Medications   Prescriptions Last Dose Informant Patient Reported? Taking?   HUMALOG 100 UNIT/ML SC SOLN Not Taking Nursing Home Yes No   Sig: Basal rate 5 units; bolus with 1-5 units TID with meals based on BG   Patient not taking: Reported on 5/26/2023   INSULIN PUMP ACCESSORIES MISC Not Taking Nursing Home No No   Sig: as directed   Patient not taking: Reported on 5/26/2023   Lidocaine (LIDOCARE) 4 % Patch Not Taking Nursing Home Yes No   Sig: Place 1 patch onto  the skin every 24 hours To prevent lidocaine toxicity, patient should be patch free for 12 hrs daily. Right knee   Patient not taking: Reported on 5/26/2023   METHADONE HCL 5 MG OR TABS 5/26/2023 at 1219 Nursing Home Yes Yes   Sig: Take 5 mg by mouth 3 times daily   ONE TOUCH TEST STRIPS TEST   VI 5/26/2023 at 612 Nursing Home No Yes   Sig: use as directed   Patient taking differently: 1 strip by In Vitro route 4 times daily ACHS   Warfarin Therapy Reminder  Nursing Home Yes No   Sig: Take 1 each by mouth See Admin Instructions See facility orders for current dose. Next INR 11/10/22   insulin aspart (NOVOLOG PEN) 100 UNIT/ML pen 5/26/2023 at 1219-18units Nursing Home Yes Yes   Sig: Inject Subcutaneous 3 times daily Sliding Scale:  150-199= 3 Units 200-249= 6 Units  250-299= 9 Units 300-349= 12 Units 350-399= 15 Units >399= 18 Units & call MD   lisinopril (ZESTRIL) 10 MG tablet 5/26/2023 at 0809 Nursing Home Yes Yes   Sig: Take 10 mg by mouth daily   menthol-zinc oxide (CALMOSEPTINE) 0.44-20.6 % OINT ointment  Nursing Home Yes Yes   Sig: Apply topically 4 times daily as needed   metFORMIN (GLUCOPHAGE XR) 500 MG 24 hr tablet 5/25/2023 at 1558 Nursing Home Yes Yes   Sig: Take 2,000 mg by mouth daily (with dinner)   methadone (DOLOPHINE) 5 MG tablet 5/25/2023 at 1558 Nursing Home Yes Yes   Sig: Take 10 mg by mouth daily   naloxone (NARCAN) 4 MG/0.1ML nasal spray  Nursing Home Yes Yes   Sig: Spray 1 spray in nostril   oxybutynin (DITROPAN) 5 MG tablet Not Taking Nursing Home Yes No   Sig: Take 5 mg by mouth 2 times daily as needed for bladder spasms   Patient not taking: Reported on 5/26/2023   pentoxifylline ER (TRENTAL) 400 MG CR tablet 5/26/2023 at 0809 Nursing Home Yes Yes   Sig: Take 400 mg by mouth 3 times daily (with meals)   pregabalin (LYRICA) 50 MG capsule Not Taking Nursing Home Yes No   Sig: Take 50 mg by mouth 3 times daily as needed   Patient not taking: Reported on 5/26/2023   simvastatin (ZOCOR) 20 MG  tablet 5/25/2023 at 2015 Nursing Home Yes Yes   Sig: Take 20 mg by mouth At Bedtime   torsemide (DEMADEX) 10 MG tablet 5/26/2023 at 0809 Nursing Home Yes Yes   Sig: Take 10 mg by mouth 2 times daily   warfarin ANTICOAGULANT (COUMADIN) 2.5 MG tablet 5/24/2023 at 1538  Yes No   Sig: Take 2.5 mg by mouth Monday Wednesday Friday   warfarin ANTICOAGULANT (COUMADIN) 3 MG tablet 5/23/2023 at 1639 Nursing Home Yes Yes   Sig: Take 3 mg by mouth Sunday Tuesday Thursday Saturday      Facility-Administered Medications: None     Allergies   Allergies   Allergen Reactions     Prednisone Nausea and Vomiting     Morphine Nausea and Vomiting     Morphine [Fumaric Acid] Nausea and Vomiting     Seasonal Allergies Difficulty breathing       Physical Exam   Vital Signs: Temp: 97.9  F (36.6  C) Temp src: Oral BP: 102/43 Pulse: 104   Resp: 20 SpO2: (!) 61 % O2 Device: None (Room air)    Weight: 262 lbs 6.4 oz      GENERAL: The patient is not in any acute distressed. Awake and alert.  HEENT: Nonicteric sclerae, PERRLA, EOMI. Oropharynx clear. Moist mucous membranes. Conjunctivae appear well perfused.  HEART: Regular rate and rhythm without murmurs. No lower extremities edema.  LUNGS: Clear to auscultation bilaterally. No wheezing, crackles or rhonchi  ABDOMEN: Soft, positive bowel sounds, nontender.  SKIN: Pressured ulcers on the bottom.  NEUROLOGIC: AxO x 3.  Cranial nerves II-XII intact without motor/sensory deficit.      Data     Recent Labs   Lab 05/26/23  1708 05/26/23  1508 05/26/23  1428 05/26/23  0745   WBC  --  10.7  --   --    HGB  --  12.5  --   --    MCV  --  91  --   --    PLT  --  178  --   --    INR  --  5.24*  --  4.76*   NA  --  141  --   --    POTASSIUM  --  3.7  --   --    CHLORIDE  --  100  --   --    CO2  --  30*  --   --    BUN  --  49.1*  --   --    CR  --  1.24*  --   --    ANIONGAP  --  11  --   --    ZAKIA  --  10.9*  --   --    GLC 71 65* 124*  --    ALBUMIN  --  3.5  --   --    PROTTOTAL  --  7.5  --   --     BILITOTAL  --  0.6  --   --    ALKPHOS  --  90  --   --    ALT  --  23  --   --    AST  --  43*  --   --          Recent Results (from the past 24 hour(s))   XR Chest Port 1 View    Narrative    EXAM: XR CHEST PORT 1 VIEW  LOCATION: [facility]  DATE/TIME: 5/26/2023 8:53 PM CDT    INDICATION: PICC placement  COMPARISON: None.      Impression    IMPRESSION: Right-sided PICC line with tip over atrial caval junction. Borderline enlarged heart. Mildly tortuous atherosclerotic aorta. No pneumothorax or pleural effusion. No acute osseous abnormality.

## 2023-05-27 NOTE — PROGRESS NOTES
Formerly [facility]    Medicine Progress Note - Hospitalist Service    Date of Admission:  5/26/2023    Assessment & Plan    Change to ancef and vanco  Restart home methadone   If further hypotension will need vasopressor initiation  Wound care nurse on 5/30/23 when next available at this facility    Patient is a 78-year-old female with past medical history of type 2 diabetes, hypertension, hyperlipidemia, morbid obesity, COPD, paroxysmal atrial fibrillation on Coumadin, primary hyperparathyroidism, depression, [place] who was hospitalized earlier this month at [place] due to cellulitis from decubitus ulcers in the buttocks and perineal/upper thigh region who improved on IV Rocephin and was discharged to [facility] TCU with ongoing oral cefazolin for 10-day antibiotic course completion.  Patient cellulitis and sores initially improved however in recent days there has been concern from the nursing staff at the TCU that they are becoming reinfected and patient was brought back to the emergency room where she was found to have sepsis secondary to recurrent cellulitis surrounding her buttocks wounds and wounds in bilateral lower extremities and she was admitted and placed on Zosyn and vancomycin.  Due to difficulty in finding IV access a PICC line has been placed and patient has now received 3 L fluid bolusing due to borderline low blood pressures with blood pressure finally appearing to stabilize this morning and cellulitis starting to decrease from lines of demarcation.  She continues to be at risk for further worsening and decompensation and will need close clinical monitoring.  We will continue with intermediate overflow status in the ICU today to ensure further blood pressure stability and improvement of sepsis symptoms.    Principal Problem:    Sepsis without acute organ dysfunction, due to unspecified organism (H)    Cellulitis of buttock    Cellulitis - bilateral lower extremities     Assessment: Patient with mild hypotension responsive to fluids, lactic acid of 4.4, worsening cellulitis surrounding chronic wounds on Zosyn and vancomycin.  PICC line now in place due to difficulty with IV access the patient has gotten 3 L normal saline fluid over the course of the night with most recent blood pressures appearing more stable in the  range    Plan:   -Transition antibiotics to Ancef and vancomycin, discontinue Zosyn  -Continue to monitor fluid closely and if recurrent hypotension occurs going forward would need to consider vasopressor support  -Continue to monitor and nursing to inform if there is any concern for erythema extending outside lines of demarcation  -Wound care nurse to evaluate patient when next available on 5/30/2023  -Monitor serial white blood cell count and CRP    Active Problems:    UTI (urinary tract infection) - possible    Assessment: Patient has chronic indwelling Braga catheter in place that was inserted during her previous hospital stay in an effort to allow ulcers to heal more rapidly.  Braga has been exchanged but there is concern for possible UTI based on UA results.  Urine culture is pending    Plan:   -Continue with antibiotics as outlined above and monitor for culture results      GUSTAVO (acute kidney injury) (H)    Assessment: Creatinine of 1.24 with previous baseline of 0.7-0.8, thought secondary to dehydration and sepsis.  Has slightly improved to 1.16 today with aggressive fluid resuscitation.  Patient is having adequate urinary output with potassium normal and ongoing significantly elevated BUN to creatinine ratio    Plan:   -Continue IV fluids at maintenance dosing  -Recheck creatinine tomorrow to ensure ongoing improvement      Dehydration    Assessment: Suspected at time of admission, likely contributing to acute kidney injury as above    Plan:   -Continue with IV fluids as above      Decubitus ulcers - 5 present on buttocks/perineal region, numerous scabbed  over and unstagable on shin and bilateral feet with some bloody blisters noted on feet    Assessment: Patient had known ulcerations upon arrival to [place] earlier this month and wounds had apparently been closing while at the TCU environment but have worsened in the last week for reasons unclear    Plan:   -Continue with Braga catheter to aid in healing of extensive ulcerations  -Continue current wound care but will have wound care nurse see patient when available following holiday weekend  -Continue with antibiotics for treatment of acute cellulitis as above      Type 2 diabetes mellitus with complication, with long-term current use of insulin (H)    Hypoglycemia    Assessment: Patient is normally on metformin XR 2000 mg daily in addition to sliding scale insulin.  Blood sugars were apparently in the 600 range at the TCU prior to transfer to the ED and patient was given an unknown amount of insulin prior to transfer.  On arrival blood sugar was actually 65 but improved to 71 without intervention and has now increased into the low to mid 100 range.  Home metformin has been held    Plan:   -Continue to hold metformin  -Provide medium resistance sliding scale for hyperglycemia      Essential hypertension with goal blood pressure less than 140/90    Assessment: Patient is normally on lisinopril 10 mg daily, has been held since time of admission due to mild hypotension initially present    Plan:   -Continue to hold lisinopril and monitor blood pressure closely      Mixed hyperlipidemia    Assessment: On simvastatin at baseline    Plan:   -Continue simvastatin 20 mg daily as per home regimen       OBESITY    Assessment: Patient has BMI of 42    Plan:   -Continue supportive cares      Diabetic polyneuropathy associated with type 2 diabetes mellitus (H)    Assessment: Patient on previously has been on Lyrica for this condition however that was stopped at unknown timeframe and patient has been managing chronic pain with  methadone    Plan:   -Restart chronic methadone dosing      Hypersomnia with sleep apnea    Assessment: Patient is supposed to be using CPAP device    Plan:   -We will supplement with oxygen as needed for hypoxemia with sleep      COPD (chronic obstructive pulmonary disease) (H)    Assessment: Previous documented history but details are unknown and patient does not appear to be on any long-acting or rescue medications for this.  No evidence of acute exacerbation    Plan:   -Continue to monitor respiratory status closely      Paroxysmal atrial fibrillation (H)    Assessment: Previous history however patient is not on any rate controlling medications, does take Coumadin for anticoagulation purposes with supratherapeutic INR is below    Plan:   -Continue continuous cardiac monitoring  -Avoid antihypertensive medications at this time due to hypotension  -Hold Coumadin due to supratherapeutic INR      Primary hyperparathyroidism (H)    Assessment: Previously diagnosed, however there has been debate as to whether or not she is truly hyperparathyroid or has severe vitamin D deficiency.      Plan:   -No acute intervention is needed during this hospital stay      Chronic pain    Assessment: Patient is on methadone 5 mg at 8:00, 5 mg at noon, 10 mg at 3 PM, and 5 mg before bedtime    Plan:   -We will restart home regimen      Warfarin-induced coagulopathy (H)    Supratherapeutic INR    Assessment: INR is elevated at 6.83, increasing time of presentation despite Coumadin being held.  No signs of active bleeding currently    Plan:   -We will continue to hold Coumadin and trend downward INR which is anticipated to begin tomorrow  -If patient does have signs of active bleeding and would consider vitamin K reversal to more quickly correct back to goal range       Diet: Combination Diet Regular Diet Adult    DVT Prophylaxis: Warfarin  Braga Catheter: PRESENT, indication: Wound Healing  Lines: PRESENT      PICC 05/26/23 Double Lumen  "Right Basilic-Site Assessment: WDL      Cardiac Monitoring: ACTIVE order. Indication: Telemetry monitoring  Code Status: Full Code      Clinically Significant Risk Factors Present on Admission           # Hypercalcemia: Highest Ca = 10.9 mg/dL in last 2 days, will monitor as appropriate  # Hypomagnesemia: Lowest Mg = 1.5 mg/dL in last 2 days, will replace as needed   # Hypoalbuminemia: Lowest albumin = 2.3 g/dL at 5/27/2023  5:58 AM, will monitor as appropriate  # Drug Induced Coagulation Defect: home medication list includes an anticoagulant medication  # Thrombocytopenia: Lowest platelets = 124 in last 2 days, will monitor for bleeding   # Hypertension: Noted on problem list   # Acute Respiratory Failure: Documented O2 saturation < 91%.  Continue supplemental oxygen as needed     # Severe Obesity: Estimated body mass index is 42.35 kg/m  as calculated from the following:    Height as of this encounter: 1.676 m (5' 6\").    Weight as of this encounter: 119 kg (262 lb 6.4 oz).            Disposition Plan      Expected Discharge Date: 05/28/2023                  Velma Coles MD  Hospitalist Service  [facility]  Securely message with [employer] (more info)  Text page via [employer] Paging/Directory   ______________________________________________________________________    Interval History   Patient's blood pressures continue to become mildly hypotensive and patient required recurrent fluid boluses and has completed her third bolus recently with blood pressures appearing stable in the 100-110 range systolic.  Cellulitis remains within lines of demarcation.  Other vital signs remained stable.  Patient has been very insistent on how things should be done from a nursing perspective at times nursing is having difficulty offloading pressure on ulcers and getting accurate blood pressure reading due to patient's requests however this does appear to be getting better throughout the morning " per nursing as she and the patient are learning in effective communication method.  Patient denies any new symptoms.  No new nursing concerns.    Physical Exam   Vital Signs: Temp: 97.8  F (36.6  C) Temp src: Oral BP: 115/51 Pulse: 93   Resp: 12 SpO2: 92 % O2 Device: None (Room air)    Weight: 262 lbs 6.4 oz    Constitutional: awake, alert, somewhat cooperative, no apparent distress, and appears stated age  Respiratory: No increased work of breathing, good air exchange, clear to auscultation bilaterally, no crackles or wheezing  Cardiovascular: Regular rate and rhythm  Skin: Patient has 5 fairly large decubitus ulcers present on the buttocks and perineal/inner thigh region -appearance also has concern for possible shearing or friction injury.  Patient also has scabs present on her left anterior shin and numerous on her bilateral feet.  She also has some blisters that appeared blood-filled on bilateral feet.  She has significant erythema and swelling surrounding the buttocks and perineal region as well as bilateral lower legs but all areas effective are within the lines of demarcation.  Neurologic: Awake, alert, oriented to name, place and overall to situation    Medical Decision Making       60 MINUTES SPENT BY ME on the date of service doing chart review, history, exam, documentation & further activities per the note.      Data     I have personally reviewed the following data over the past 24 hrs:    6.9  \   10.4 (L)   / 124 (L)     141 105 41.6 (H) /  186 (H)   3.6 29 1.16 (H) \       ALT: 15 AST: 33 AP: 61 TBILI: 0.4   ALB: 2.3 (L) TOT PROTEIN: 5.2 (L) LIPASE: N/A       TSH: N/A T4: N/A A1C: 8.2 (H)       Procal: 0.16 (H) CRP: 66.07 (H) Lactic Acid: N/A       INR:  6.83 (HH) PTT:  N/A   D-dimer:  N/A Fibrinogen:  N/A

## 2023-05-27 NOTE — PROGRESS NOTES
Lisinopril-HCTZ refilled and sent to pharmacy. Please notify pt. S-(situation): Patient arrives to room 217 via cart from [facility]    B-(background): Wounds, comes from [facility] [place]    A-(assessment): se pictures of wounds    R-(recommendations): Orders reviewed with Pt, Daughter. Will monitor patient per MD orders.     Inpatient nursing criteria listed below were met:    Health care directives status obtained and documented: No  VTE ordered/documented: Yes  Skin issues/needs documented:Yes  Isolation addressed and Signage used: Yes  Fall Prevention: Care plan updated Yes Education given and documented Yes  Care Plan initiated and Co-Morbidities added: Yes  Education Assessment documented:Yes  Admission Education Documented: Yes  If present CAUTI/CLABI Education done: Yes  New medication patient education completed and documented (Possible Side Effects of Common Medications handout): Yes  Allergies Reviewed: Yes  Admission Medication Reconciliation completed: Yes  Home medications if not able to send immediately home with family stored here: NA  Reminder note placed in discharge instructions regarding home meds: NA  Individualized care needs/preferences addressed and charted: Yes  Provider Notified that patient has arrived to the unit: Yes

## 2023-05-28 ENCOUNTER — APPOINTMENT (OUTPATIENT)
Dept: PHYSICAL THERAPY | Facility: CLINIC | Age: 78
DRG: 872 | End: 2023-05-28
Attending: FAMILY MEDICINE
Payer: COMMERCIAL

## 2023-05-28 LAB
ALBUMIN SERPL BCG-MCNC: 2.4 G/DL (ref 3.5–5.2)
ALP SERPL-CCNC: 61 U/L (ref 35–104)
ALT SERPL W P-5'-P-CCNC: 10 U/L (ref 10–35)
ANION GAP SERPL CALCULATED.3IONS-SCNC: 9 MMOL/L (ref 7–15)
AST SERPL W P-5'-P-CCNC: 28 U/L (ref 10–35)
BACTERIA UR CULT: ABNORMAL
BILIRUB SERPL-MCNC: 0.3 MG/DL
BUN SERPL-MCNC: 31 MG/DL (ref 8–23)
CALCIUM SERPL-MCNC: 9.1 MG/DL (ref 8.8–10.2)
CHLORIDE SERPL-SCNC: 105 MMOL/L (ref 98–107)
CREAT SERPL-MCNC: 1.15 MG/DL (ref 0.51–0.95)
CRP SERPL-MCNC: 46.61 MG/L
DEPRECATED HCO3 PLAS-SCNC: 27 MMOL/L (ref 22–29)
ERYTHROCYTE [DISTWIDTH] IN BLOOD BY AUTOMATED COUNT: 13.6 % (ref 10–15)
GFR SERPL CREATININE-BSD FRML MDRD: 49 ML/MIN/1.73M2
GLUCOSE BLDC GLUCOMTR-MCNC: 156 MG/DL (ref 70–99)
GLUCOSE BLDC GLUCOMTR-MCNC: 183 MG/DL (ref 70–99)
GLUCOSE BLDC GLUCOMTR-MCNC: 193 MG/DL (ref 70–99)
GLUCOSE BLDC GLUCOMTR-MCNC: 231 MG/DL (ref 70–99)
GLUCOSE BLDC GLUCOMTR-MCNC: 284 MG/DL (ref 70–99)
GLUCOSE SERPL-MCNC: 177 MG/DL (ref 70–99)
HCT VFR BLD AUTO: 31.5 % (ref 35–47)
HGB BLD-MCNC: 10.1 G/DL (ref 11.7–15.7)
INR PPP: 5.57 (ref 0.85–1.15)
MAGNESIUM SERPL-MCNC: 1.9 MG/DL (ref 1.7–2.3)
MCH RBC QN AUTO: 29.7 PG (ref 26.5–33)
MCHC RBC AUTO-ENTMCNC: 32.1 G/DL (ref 31.5–36.5)
MCV RBC AUTO: 93 FL (ref 78–100)
PLATELET # BLD AUTO: 116 10E3/UL (ref 150–450)
POTASSIUM SERPL-SCNC: 3.9 MMOL/L (ref 3.4–5.3)
PROCALCITONIN SERPL IA-MCNC: 0.15 NG/ML
PROT SERPL-MCNC: 5.5 G/DL (ref 6.4–8.3)
RBC # BLD AUTO: 3.4 10E6/UL (ref 3.8–5.2)
SODIUM SERPL-SCNC: 141 MMOL/L (ref 136–145)
WBC # BLD AUTO: 6.5 10E3/UL (ref 4–11)

## 2023-05-28 PROCEDURE — 86140 C-REACTIVE PROTEIN: CPT | Performed by: FAMILY MEDICINE

## 2023-05-28 PROCEDURE — 250N000011 HC RX IP 250 OP 636: Performed by: INTERNAL MEDICINE

## 2023-05-28 PROCEDURE — 250N000012 HC RX MED GY IP 250 OP 636 PS 637: Performed by: INTERNAL MEDICINE

## 2023-05-28 PROCEDURE — 85610 PROTHROMBIN TIME: CPT | Performed by: INTERNAL MEDICINE

## 2023-05-28 PROCEDURE — 250N000013 HC RX MED GY IP 250 OP 250 PS 637: Performed by: INTERNAL MEDICINE

## 2023-05-28 PROCEDURE — 80053 COMPREHEN METABOLIC PANEL: CPT | Performed by: FAMILY MEDICINE

## 2023-05-28 PROCEDURE — 99233 SBSQ HOSP IP/OBS HIGH 50: CPT | Performed by: FAMILY MEDICINE

## 2023-05-28 PROCEDURE — 250N000011 HC RX IP 250 OP 636: Performed by: FAMILY MEDICINE

## 2023-05-28 PROCEDURE — 84145 PROCALCITONIN (PCT): CPT | Performed by: FAMILY MEDICINE

## 2023-05-28 PROCEDURE — 83735 ASSAY OF MAGNESIUM: CPT | Performed by: FAMILY MEDICINE

## 2023-05-28 PROCEDURE — 250N000013 HC RX MED GY IP 250 OP 250 PS 637: Performed by: FAMILY MEDICINE

## 2023-05-28 PROCEDURE — 85027 COMPLETE CBC AUTOMATED: CPT | Performed by: FAMILY MEDICINE

## 2023-05-28 PROCEDURE — 120N000001 HC R&B MED SURG/OB

## 2023-05-28 RX ORDER — HYDROMORPHONE HYDROCHLORIDE 2 MG/1
4 TABLET ORAL EVERY 4 HOURS PRN
Status: DISCONTINUED | OUTPATIENT
Start: 2023-05-28 | End: 2023-05-31 | Stop reason: HOSPADM

## 2023-05-28 RX ORDER — LISINOPRIL 10 MG/1
10 TABLET ORAL DAILY
Status: DISCONTINUED | OUTPATIENT
Start: 2023-05-28 | End: 2023-05-31 | Stop reason: HOSPADM

## 2023-05-28 RX ORDER — MAGNESIUM OXIDE 400 MG/1
400 TABLET ORAL EVERY 4 HOURS
Status: COMPLETED | OUTPATIENT
Start: 2023-05-28 | End: 2023-05-28

## 2023-05-28 RX ORDER — FAMOTIDINE 20 MG/1
20 TABLET, FILM COATED ORAL 2 TIMES DAILY
Status: DISCONTINUED | OUTPATIENT
Start: 2023-05-28 | End: 2023-05-31 | Stop reason: HOSPADM

## 2023-05-28 RX ORDER — CEFTRIAXONE 1 G/1
1 INJECTION, POWDER, FOR SOLUTION INTRAMUSCULAR; INTRAVENOUS EVERY 24 HOURS
Status: DISCONTINUED | OUTPATIENT
Start: 2023-05-28 | End: 2023-05-30

## 2023-05-28 RX ORDER — METFORMIN HCL 500 MG
1000 TABLET, EXTENDED RELEASE 24 HR ORAL
Status: DISCONTINUED | OUTPATIENT
Start: 2023-05-29 | End: 2023-05-31 | Stop reason: HOSPADM

## 2023-05-28 RX ADMIN — SIMVASTATIN 20 MG: 20 TABLET, FILM COATED ORAL at 21:35

## 2023-05-28 RX ADMIN — Medication 2 G: at 18:08

## 2023-05-28 RX ADMIN — Medication 400 MG: at 11:21

## 2023-05-28 RX ADMIN — METHADONE HYDROCHLORIDE 5 MG: 5 TABLET ORAL at 11:21

## 2023-05-28 RX ADMIN — HYDROMORPHONE HYDROCHLORIDE 4 MG: 2 TABLET ORAL at 14:05

## 2023-05-28 RX ADMIN — PENTOXIFYLLINE 400 MG: 400 TABLET, EXTENDED RELEASE ORAL at 18:10

## 2023-05-28 RX ADMIN — METHADONE HYDROCHLORIDE 5 MG: 5 TABLET ORAL at 08:00

## 2023-05-28 RX ADMIN — MICONAZOLE NITRATE: 20 POWDER TOPICAL at 20:51

## 2023-05-28 RX ADMIN — HYDROMORPHONE HYDROCHLORIDE 4 MG: 2 TABLET ORAL at 18:08

## 2023-05-28 RX ADMIN — FAMOTIDINE 20 MG: 10 INJECTION, SOLUTION INTRAVENOUS at 11:21

## 2023-05-28 RX ADMIN — Medication 2 G: at 12:01

## 2023-05-28 RX ADMIN — VANCOMYCIN HYDROCHLORIDE 1000 MG: 1 INJECTION, SOLUTION INTRAVENOUS at 20:32

## 2023-05-28 RX ADMIN — METHADONE HYDROCHLORIDE 10 MG: 10 TABLET ORAL at 18:08

## 2023-05-28 RX ADMIN — CEFTRIAXONE SODIUM 1 G: 1 INJECTION, POWDER, FOR SOLUTION INTRAMUSCULAR; INTRAVENOUS at 21:35

## 2023-05-28 RX ADMIN — Medication 2 G: at 02:52

## 2023-05-28 RX ADMIN — Medication 400 MG: at 07:16

## 2023-05-28 RX ADMIN — PENTOXIFYLLINE 400 MG: 400 TABLET, EXTENDED RELEASE ORAL at 08:00

## 2023-05-28 RX ADMIN — METHADONE HYDROCHLORIDE 5 MG: 5 TABLET ORAL at 20:45

## 2023-05-28 RX ADMIN — FAMOTIDINE 20 MG: 20 TABLET ORAL at 20:45

## 2023-05-28 RX ADMIN — LISINOPRIL 10 MG: 10 TABLET ORAL at 11:21

## 2023-05-28 RX ADMIN — ONDANSETRON HYDROCHLORIDE 4 MG: 2 SOLUTION INTRAMUSCULAR; INTRAVENOUS at 11:59

## 2023-05-28 RX ADMIN — PENTOXIFYLLINE 400 MG: 400 TABLET, EXTENDED RELEASE ORAL at 14:05

## 2023-05-28 RX ADMIN — MICONAZOLE NITRATE: 20 POWDER TOPICAL at 11:21

## 2023-05-28 RX ADMIN — INSULIN ASPART 2 UNITS: 100 INJECTION, SOLUTION INTRAVENOUS; SUBCUTANEOUS at 22:06

## 2023-05-28 ASSESSMENT — ACTIVITIES OF DAILY LIVING (ADL)
ADLS_ACUITY_SCORE: 35

## 2023-05-28 NOTE — PLAN OF CARE
Goal Outcome Evaluation:      Plan of Care Reviewed With: patient    Overall Patient Progress: improvingOverall Patient Progress: improving      Neuro: alert and oriented x4  CMS: chronic neuropathy in arms/hands feet/legs.   Pulmonary: lung sounds clear throughout  CV: afib controlled with occasional PVC's   GI: bowels active. Eating 25% of meals. No bowel movement.   : jung catheter patent. Urine is mirna colored almost looks like old blood. Hgb stable.   Skin: no change. Dressings changed to buttock.   Pain: methadone restarted today to treat chronic pain   Activity: up with assist of 2 walker/GB to chair. Used step stool to help with reposition in chair. Chair reposition slip sheet was brought into room and will be used next time up to chair.   Lines/Tubes/Drains: double lumen PICC   Drips: NS    Plan: encourage activity, dressing changes as needed and daily, monitor blood pressures.

## 2023-05-28 NOTE — PLAN OF CARE
"Goal Outcome Evaluation:      Plan of Care Reviewed With: patient    Overall Patient Progress: no changeOverall Patient Progress: no change    Outcome Evaluation: Dressings changed x1 overnight with small/moderate drainage.  1 large BM overningt.  Urine remains very dark with sediment and small bloot clots.  Q6 HgB checks.  's overnight, BP's WNL with MAP >65.  Tele shows Fib/Flutter.   AM Labs currently drawn and pending    /63   Pulse 77   Temp 98.4  F (36.9  C) (Oral)   Resp 12   Ht 1.676 m (5' 6\")   Wt 117.3 kg (258 lb 8 oz)   SpO2 90%   BMI 41.72 kg/m     "

## 2023-05-28 NOTE — PHARMACY-ANTICOAGULATION SERVICE
Clinical Pharmacy - Warfarin Dosing Consult     Pharmacy has been consulted to manage this patient s warfarin therapy.  Indication: Atrial Fibrillation  Therapy Goal: INR 2-3  Warfarin Prior to Admission: Yes  Warfarin PTA Regimen: 2.5 mg on Mon, Wed, Fri and 3 mg all other days of the week  Significant drug interactions: Zosyn    INR   Date Value Ref Range Status   05/28/2023 5.57 (HH) 0.85 - 1.15 Final   05/27/2023 6.83 (HH) 0.85 - 1.15 Final       Recommend to HOLD warfarin again today.  Pharmacy will monitor Linda Awa Bellgan daily and order warfarin doses to achieve specified goal.      Please contact pharmacy as soon as possible if the warfarin needs to be held for a procedure or if the warfarin goals change.

## 2023-05-28 NOTE — PROGRESS NOTES
05/28/23 1100   Appointment Info   Signing Clinician's Name / Credentials (PT) Ivanna Botello, PT, DPT, OCS, CSCS   Living Environment   People in Home alone   Current Living Arrangements   (came from TCU)   Living Environment Comments came from TCU and unable to return home with current medical and mobility needs, do not anticipate this to change within hospital stay for patient to return home home   Self-Care   Usual Activity Tolerance fair   Current Activity Tolerance poor   Regular Exercise No   Fall history within last six months yes   Number of times patient has fallen within last six months 8   General Information   Onset of Illness/Injury or Date of Surgery 05/26/23   Referring Physician Velma Coles MD   Patient/Family Therapy Goals Statement (PT) improve strength and heal wounds prior to return home   Pertinent History of Current Problem (include personal factors and/or comorbidities that impact the POC) Pt presents to ER from [place] [facility] 5/26/23 with sepsis, cellulitis, UTI, GUSTAVO, dehydration.  Pt was in TCU for wound care and PT/OT.  Patient is not confiendent or able to return home at this time but does not want to return to previous TCU   Existing Precautions/Restrictions   (sacral and LE wounds)   Cognition   Affect/Mental Status (Cognition) WNL   Orientation Status (Cognition) oriented x 4   Pain Assessment   Patient Currently in Pain Yes, see Vital Sign flowsheet   Integumentary/Edema   Integumentary/Edema Comments significant LE wounds and sacral wounds not visualized due to bandages, part of treatment plan   Range of Motion (ROM)   Range of Motion ROM is WFL;ROM deficits secondary to pain   Strength (Manual Muscle Testing)   Strength (Manual Muscle Testing) Deficits observed during functional mobility   Bed Mobility   Bed Mobility rolling left;rolling right;scooting/bridging;supine-sit   Rolling Left [place] (Bed Mobility) modified independence   Rolling Right [place] (Bed  Mobility) modified independence   Scooting/Bridging [place] (Bed Mobility) modified independence   Supine-Sit [place] (Bed Mobility) supervision  (very slow due to pain, step up assistance from nurse/PT)   Bed Mobility Limitations decreased ability to use legs for bridging/pushing   Impairments Contributing to Impaired Bed Mobility pain;decreased strength  (wounds)   Comment, (Bed Mobility) significant use of bed rails and HOB raised required to complete at current assistance   Transfers   Transfers sit-stand transfer   Maintains Weight-bearing Status (Transfers) able to maintain   Transfer Safety Concerns Noted decreased step length  (significant pain)   Impairments Contributing to Impaired Transfers pain;decreased strength;impaired balance   Sit-Stand Transfer   Sit-Stand [place] (Transfers) contact guard;moderate assist (50% patient effort);2 person assist   Assistive Device (Sit-Stand Transfers) walker, front-wheeled   Comment, (Sit-Stand Transfer) pt adament to complete in her own process; slowed speed due to pain, appropriate sequencing and decision processing; use of sliding sheet to avoid shear and get patient to appropraite position   Gait/Stairs (Locomotion)   [place] Level (Gait) minimum assist (75% patient effort)   Assistive Device (Gait) walker, front-wheeled  (support from provider to not have AD move)   Distance in Feet 3   Pattern (Gait) step-to   Deviations/Abnormal Patterns (Gait) antalgic;base of support, wide;ron decreased;festinating/shuffling   Clinical Impression   Criteria for Skilled Therapeutic Intervention Yes, treatment indicated   PT Diagnosis (PT) generalized weakness, decreased mobility   Influenced by the following impairments pain, decreased strength, decreased balance   Functional limitations due to impairments bed mobility, transfers, ambulation   Clinical Presentation (PT Evaluation Complexity) Evolving/Changing   Clinical Presentation Rationale clinical  judgement, chart review   Clinical Decision Making (Complexity) moderate complexity   Planned Therapy Interventions (PT) balance training;bed mobility training;gait training;home exercise program;neuromuscular re-education;patient/family education;ROM (range of motion);stair training;strengthening;stretching   Anticipated Equipment Needs at Discharge (PT)   (to be determined at discharge location)   Risk & Benefits of therapy have been explained evaluation/treatment results reviewed;care plan/treatment goals reviewed;risks/benefits reviewed;current/potential barriers reviewed;participants voiced agreement with care plan;participants included;patient   Clinical Impression Comments The patient is a 77 yo female who was admitted to the hospital in ICU following presentation to ER from TCU on 5/26/23.  Patient had sacral and LE wounds that have progressed into sepsis, cellulitis, UTI, GUSTAVO and dehydration.  Patient not appropriate to return home from hospital and will require futher TCU care for wound management and strengthening to improve mobility.  Skilled physical therapy is required.   PT Total Evaluation Time   PT Eval, Moderate Complexity Minutes (75010) 45   Physical Therapy Goals   PT Predicted Duration/Target Date for Goal Attainment 06/02/23   PT Goals Bed Mobility;Transfers;Gait   PT: Bed Mobility Supine to/from sit;Rolling;Bridging;Modified independent  (without use of bed rails)   PT: Transfers Assistive device;Minimal assist;Sit to/from stand  (without shearing to skin)   PT: Gait Minimal assist;25 feet;Rolling walker   PT Discharge Planning   PT Plan transfers, ambulation, bed mobility   PT Discharge Recommendation (DC Rec) Transitional Care Facility   PT Rationale for DC Rec Pt came from TCU and lives alone [facility] asisstance; patient requires significant use of equipment to complete bed mobility, supine<>sit transfers and sit<>stand transfers; ambulation 3 ft with Min A and FWW to chair; requires Mod A  x 2 to complete stand>sit transfer   PT Brief overview of current status Mod A x 2 with sliding sheet to chair; Mod I with significant use of bed rails and hospital bed features   Total Session Time   Total Session Time (sum of timed and untimed services) 45         Ivanna Botello, PT, DPT, OCS, CSCS  [facility]  [phone]

## 2023-05-28 NOTE — PROGRESS NOTES
[facility]    Medicine Progress Note - Hospitalist Service    Date of Admission:  5/26/2023    Assessment & Plan     Patient is a 78-year-old female with past medical history of type 2 diabetes, hypertension, hyperlipidemia, morbid obesity, COPD, paroxysmal atrial fibrillation on Coumadin, primary hyperparathyroidism, depression, [place] who was hospitalized earlier this month at [place] due to cellulitis from decubitus ulcers in the buttocks and perineal/upper thigh region who improved on IV Rocephin and was discharged to [facility] with ongoing oral cefazolin for 10-day antibiotic course completion.  Patient cellulitis and sores initially improved however in recent days there had been concern from the nursing staff at the TCU that they are becoming reinfected and patient was brought back to the emergency room where she was found to have sepsis secondary to recurrent cellulitis surrounding her buttocks wounds and wounds in bilateral lower extremities and she was admitted and placed on Zosyn and vancomycin.  Due to difficulty in finding/maintaining IV access in patient with repeat hypotension requiring IV fluids and possible need of vasopressors a PICC line was placed and patient was placed intermediate overflow status in the ICU for close monitoring.      Patient has progressively improved with blood pressure stabilizing and now slightly hypertensive and cellulitis starting to decrease from lines of demarcation even after patient was transition to Ancef and vancomycin.  She can transition to general medical floor for ongoing recovery.  Physical therapy is recommending return to TCU but patient's goal is to find a TCU closer to her home and referrals are pending with anticipated discharge in 2 to 3 days.      Discussed with patient and her family today the importance of being mindful of the sores continuously and discussed goals at TCU as well as how to continue to get things to  heal as she returns home.  Family had many concerns regarding patient's baseline habits (such as sleeping in her wheelchair as well as sitting in it for prolonged periods of time - patient had not slept in her bed in years) and discussed options that family could start working on that would improve healing ability going forward once patient is medically ready to go home.  Answered all questions patient and family had    Principal Problem:    Sepsis without acute organ dysfunction, due to unspecified organism (H)    Cellulitis of buttock    Cellulitis - bilateral lower extremities    Assessment: Patient presented with with mild hypotension responsive to fluids, lactic acid of 4.4, worsening cellulitis surrounding chronic wounds.  Improving on Ancef and vancomycin.  PICC line in place due to difficulty with IV access.    Plan:   -Transition patient to MedSurg status  -Continue Ancef and vancomycin  -Wound care nurse to evaluate patient when next available on 5/30/2023  -Monitor serial white blood cell count and CRP  -Possible narrowing of antibiotics to oral regimen in 1 to 2 days as patient continues to clinically improve.  -Social work to continue working on TCU placement at discharge    Active Problems:    UTI (urinary tract infection) - possible    Assessment: Patient has chronic indwelling Braga catheter in place that was inserted during her previous hospital stay in an effort to allow ulcers to heal more rapidly.  Braga has been exchanged in the emergency department but there is concern for possible UTI based on UA results from postexchange Braag.  Urine culture pending    Plan:   -Continue with antibiotics as outlined above and monitor for culture results      GUSTAVO (acute kidney injury) (H)    Assessment: Creatinine of 1.24 with previous baseline of 0.7-0.8, thought secondary to dehydration and sepsis.  Has slightly improved to 1.15 today with aggressive fluid resuscitation.  Patient is having adequate urinary  output with potassium normal and ongoing significantly elevated BUN to creatinine ratio    Plan:   -Continue IV fluids at maintenance dosing  -Recheck creatinine tomorrow to ensure ongoing improvement      Dehydration    Assessment: Suspected at time of admission, likely contributing to acute kidney injury as above    Plan:   -Continue with IV fluids as above      Decubitus ulcers - 5 present on buttocks/perineal region, numerous scabbed over and unstagable on shin and bilateral feet with some bloody blisters noted on feet    Assessment: Patient had known ulcerations upon arrival to [place] earlier this month and wounds had apparently been closing while at the TCU environment but have worsened in the last week for reasons unclear    Plan:   -Continue with Braga catheter to aid in healing of extensive ulcerations  -Continue current wound care but will have wound care nurse see patient when available following holiday weekend  -Continue with antibiotics for treatment of acute cellulitis as above      Type 2 diabetes mellitus with complication, with long-term current use of insulin (H)    Hypoglycemia    Assessment: Patient is normally on metformin XR 2000 mg daily in addition to sliding scale insulin.  Blood sugars were apparently in the 600 range at the TCU prior to transfer to the ED and patient was given an unknown amount of insulin prior to transfer.  On arrival blood sugar was actually 65 but improved to 71 without intervention and has now increased into the low to mid 100 range.  Home metformin has been held    Plan:   -Continue to hold metformin but would restart it as patient's oral intake continues to improve  -Provide medium resistance sliding scale for hyperglycemia      Essential hypertension with goal blood pressure less than 140/90    Assessment: Patient is normally on lisinopril 10 mg daily.  Was transiently held due to persistent hypotension but blood pressures are now consistently elevated    Plan:    -Restart lisinopril and monitor for overall blood pressure improvement      Mixed hyperlipidemia    Assessment: On simvastatin at baseline    Plan:   -Continue simvastatin 20 mg daily as per home regimen       OBESITY    Assessment: Patient has BMI of 42    Plan:   -Continue supportive cares      Diabetic polyneuropathy associated with type 2 diabetes mellitus (H)    Assessment: Patient on previously has been on Lyrica for this condition however that was stopped at unknown timeframe and patient has been managing chronic pain with methadone    Plan:   -Restart chronic methadone dosing      Hypersomnia with sleep apnea    Assessment: Patient is supposed to be using CPAP device    Plan:   -We will supplement with oxygen as needed for hypoxemia with sleep      COPD (chronic obstructive pulmonary disease) (H)    Assessment: Previous documented history but details are unknown and patient does not appear to be on any long-acting or rescue medications for this.  No evidence of acute exacerbation    Plan:   -Continue to monitor respiratory status closely      Paroxysmal atrial fibrillation (H)    Assessment: Previous history however patient is not on any rate controlling medications, does take Coumadin for anticoagulation purposes with supratherapeutic INR is below    Plan:   -Continue continuous cardiac monitoring with discontinue on transition to [facility] floor  -Avoid antihypertensive medications at this time due to hypotension  -Hold Coumadin due to supratherapeutic INR      Primary hyperparathyroidism (H)    Assessment: Previously diagnosed, however there has been debate as to whether or not she is truly hyperparathyroid or has severe vitamin D deficiency.      Plan:   -No acute intervention is needed during this hospital stay      Chronic pain    Assessment: Patient is on methadone 5 mg at 8:00, 5 mg at noon, 10 mg at 3 PM, and 5 mg before bedtime    Plan:   -We will restart home regimen      Warfarin-induced  "coagulopathy (H)    Supratherapeutic INR    Assessment: INR was elevated with peak of 6.83 and has decreased to 5.57 today.  Patient does have small amount of hematuria present in her Braga catheter which appears to be lessening likely related to acute urinary tract infection and supratherapeutic INR    Plan:   -We will continue to hold Coumadin and trend downward INR   -Pharmacy team consulted to assist in Coumadin management  -Nursing will continue to monitor mild hematuria and inform if it is worsening or if significant blood clots are present  -Nursing will also inform if other acute bleeding is noted     Diet: Combination Diet Regular Diet Adult    DVT Prophylaxis: Warfarin  Braga Catheter: PRESENT, indication: Wound Healing  Lines: PRESENT      PICC 05/26/23 Double Lumen Right Basilic-Site Assessment: WDL      Cardiac Monitoring: ACTIVE order. Indication: Telemetry monitoring  Code Status: Full Code      Clinically Significant Risk Factors           # Hypercalcemia: Highest Ca = 10.9 mg/dL in last 2 days, will monitor as appropriate  # Hypomagnesemia: Lowest Mg = 1.5 mg/dL in last 2 days, will replace as needed   # Hypoalbuminemia: Lowest albumin = 2.3 g/dL at 5/27/2023  5:58 AM, will monitor as appropriate  # Coagulation Defect: INR = 5.57 (Ref range: 0.85 - 1.15) and/or PTT = N/A, will monitor for bleeding  # Thrombocytopenia: Lowest platelets = 116 in last 2 days, will monitor for bleeding   # Hypertension: Noted on problem list       # DMII: A1C = 8.2 % (Ref range: <5.7 %) within past 6 months, PRESENT ON ADMISSION  # Severe Obesity: Estimated body mass index is 41.72 kg/m  as calculated from the following:    Height as of this encounter: 1.676 m (5' 6\").    Weight as of this encounter: 117.3 kg (258 lb 8 oz)., PRESENT ON ADMISSION          Disposition Plan    Anticipate discharge to TCU in 2 to 3 days as patient continues to clinically improve    Velma Coles MD  Hospitalist Service  M " [facility]  Securely message with [employer] (more info)  Text page via [employer] Paging/Directory   ______________________________________________________________________    Interval History   Patient afebrile, blood pressures in the upper normal to mildly hypertensive range.  Patient's strength continues to improve.  Areas of redness are decreasing from lines of demarcation.  Patient denying any new symptoms.  Nursing requesting as needed pain medication for before dressing changes and significant activity as discomfort from the source is limiting ability to do either of these things.  No other nursing concerns.    Physical Exam   Vital Signs: Temp: 98.4  F (36.9  C) Temp src: Oral BP: 122/63 Pulse: 77   Resp: 12 SpO2: 90 % O2 Device: None (Room air)    Weight: 258 lbs 8 oz    Constitutional: awake, alert, cooperative, no apparent distress, and appears stated age  Respiratory: No increased work of breathing, good air exchange, clear to auscultation bilaterally, no crackles or wheezing  Cardiovascular: Regular rate and rhythm  GI: Bowel sounds are present, abdomen is obese but nondistended  Musculoskeletal: Patient has ongoing pitting and nonpitting edema in bilateral lower legs.  Erythema is decreasing from lines of demarcation and erythema is much less bright than previous  Neurologic: Awake, alert, oriented to name, place and situation    Medical Decision Making       55 MINUTES SPENT BY ME on the date of service doing chart review, history, exam, documentation & further activities per the note.      Data     I have personally reviewed the following data over the past 24 hrs:    6.5  \   10.1 (L)   / 116 (L)     141 105 31.0 (H) /  193 (H)   3.9 27 1.15 (H) \       ALT: 10 AST: 28 AP: 61 TBILI: 0.3   ALB: 2.4 (L) TOT PROTEIN: 5.5 (L) LIPASE: N/A       Procal: 0.15 (H) CRP: 46.61 (H) Lactic Acid: N/A       INR:  5.57 (HH) PTT:  N/A   D-dimer:  N/A Fibrinogen:  N/A

## 2023-05-29 LAB
ALBUMIN SERPL BCG-MCNC: 2.8 G/DL (ref 3.5–5.2)
ALP SERPL-CCNC: 75 U/L (ref 35–104)
ALT SERPL W P-5'-P-CCNC: 6 U/L (ref 10–35)
ANION GAP SERPL CALCULATED.3IONS-SCNC: 9 MMOL/L (ref 7–15)
AST SERPL W P-5'-P-CCNC: 22 U/L (ref 10–35)
BILIRUB SERPL-MCNC: 0.2 MG/DL
BUN SERPL-MCNC: 25.7 MG/DL (ref 8–23)
CALCIUM SERPL-MCNC: 9.7 MG/DL (ref 8.8–10.2)
CHLORIDE SERPL-SCNC: 103 MMOL/L (ref 98–107)
CREAT SERPL-MCNC: 1.1 MG/DL (ref 0.51–0.95)
CRP SERPL-MCNC: 31.17 MG/L
DEPRECATED HCO3 PLAS-SCNC: 27 MMOL/L (ref 22–29)
ERYTHROCYTE [DISTWIDTH] IN BLOOD BY AUTOMATED COUNT: 13.6 % (ref 10–15)
GFR SERPL CREATININE-BSD FRML MDRD: 51 ML/MIN/1.73M2
GLUCOSE BLDC GLUCOMTR-MCNC: 165 MG/DL (ref 70–99)
GLUCOSE BLDC GLUCOMTR-MCNC: 178 MG/DL (ref 70–99)
GLUCOSE BLDC GLUCOMTR-MCNC: 190 MG/DL (ref 70–99)
GLUCOSE BLDC GLUCOMTR-MCNC: 197 MG/DL (ref 70–99)
GLUCOSE BLDC GLUCOMTR-MCNC: 228 MG/DL (ref 70–99)
GLUCOSE SERPL-MCNC: 187 MG/DL (ref 70–99)
HCT VFR BLD AUTO: 34.4 % (ref 35–47)
HGB BLD-MCNC: 10.7 G/DL (ref 11.7–15.7)
HOLD SPECIMEN: NORMAL
HOLD SPECIMEN: NORMAL
INR PPP: 3.15 (ref 0.85–1.15)
MAGNESIUM SERPL-MCNC: 1.9 MG/DL (ref 1.7–2.3)
MCH RBC QN AUTO: 29.3 PG (ref 26.5–33)
MCHC RBC AUTO-ENTMCNC: 31.1 G/DL (ref 31.5–36.5)
MCV RBC AUTO: 94 FL (ref 78–100)
PLATELET # BLD AUTO: 128 10E3/UL (ref 150–450)
POTASSIUM SERPL-SCNC: 4.1 MMOL/L (ref 3.4–5.3)
PROT SERPL-MCNC: 6.3 G/DL (ref 6.4–8.3)
RBC # BLD AUTO: 3.65 10E6/UL (ref 3.8–5.2)
SODIUM SERPL-SCNC: 139 MMOL/L (ref 136–145)
VANCOMYCIN SERPL-MCNC: 15.1 UG/ML
WBC # BLD AUTO: 6.6 10E3/UL (ref 4–11)

## 2023-05-29 PROCEDURE — 99233 SBSQ HOSP IP/OBS HIGH 50: CPT | Performed by: FAMILY MEDICINE

## 2023-05-29 PROCEDURE — 250N000013 HC RX MED GY IP 250 OP 250 PS 637: Performed by: FAMILY MEDICINE

## 2023-05-29 PROCEDURE — 120N000001 HC R&B MED SURG/OB

## 2023-05-29 PROCEDURE — 250N000011 HC RX IP 250 OP 636: Performed by: FAMILY MEDICINE

## 2023-05-29 PROCEDURE — 258N000003 HC RX IP 258 OP 636: Performed by: FAMILY MEDICINE

## 2023-05-29 PROCEDURE — 83735 ASSAY OF MAGNESIUM: CPT | Performed by: FAMILY MEDICINE

## 2023-05-29 PROCEDURE — 80053 COMPREHEN METABOLIC PANEL: CPT | Performed by: FAMILY MEDICINE

## 2023-05-29 PROCEDURE — 85610 PROTHROMBIN TIME: CPT | Performed by: INTERNAL MEDICINE

## 2023-05-29 PROCEDURE — 250N000013 HC RX MED GY IP 250 OP 250 PS 637: Performed by: INTERNAL MEDICINE

## 2023-05-29 PROCEDURE — 80202 ASSAY OF VANCOMYCIN: CPT | Performed by: FAMILY MEDICINE

## 2023-05-29 PROCEDURE — 85027 COMPLETE CBC AUTOMATED: CPT | Performed by: FAMILY MEDICINE

## 2023-05-29 PROCEDURE — 86140 C-REACTIVE PROTEIN: CPT | Performed by: FAMILY MEDICINE

## 2023-05-29 RX ORDER — WARFARIN SODIUM 1 MG/1
1 TABLET ORAL
Status: COMPLETED | OUTPATIENT
Start: 2023-05-29 | End: 2023-05-29

## 2023-05-29 RX ORDER — CEFAZOLIN SODIUM 1 G/50ML
1250 SOLUTION INTRAVENOUS EVERY 24 HOURS
Status: DISCONTINUED | OUTPATIENT
Start: 2023-05-29 | End: 2023-05-30

## 2023-05-29 RX ORDER — MAGNESIUM OXIDE 400 MG/1
400 TABLET ORAL EVERY 4 HOURS
Status: COMPLETED | OUTPATIENT
Start: 2023-05-29 | End: 2023-05-29

## 2023-05-29 RX ADMIN — VANCOMYCIN HYDROCHLORIDE 1250 MG: 1 INJECTION, POWDER, LYOPHILIZED, FOR SOLUTION INTRAVENOUS at 09:50

## 2023-05-29 RX ADMIN — WARFARIN SODIUM 1 MG: 1 TABLET ORAL at 17:30

## 2023-05-29 RX ADMIN — METHADONE HYDROCHLORIDE 5 MG: 5 TABLET ORAL at 11:58

## 2023-05-29 RX ADMIN — FAMOTIDINE 20 MG: 20 TABLET ORAL at 20:47

## 2023-05-29 RX ADMIN — Medication 400 MG: at 06:22

## 2023-05-29 RX ADMIN — METHADONE HYDROCHLORIDE 5 MG: 5 TABLET ORAL at 08:27

## 2023-05-29 RX ADMIN — MICONAZOLE NITRATE: 20 POWDER TOPICAL at 20:54

## 2023-05-29 RX ADMIN — MICONAZOLE NITRATE: 20 POWDER TOPICAL at 08:27

## 2023-05-29 RX ADMIN — LISINOPRIL 10 MG: 10 TABLET ORAL at 08:27

## 2023-05-29 RX ADMIN — PENTOXIFYLLINE 400 MG: 400 TABLET, EXTENDED RELEASE ORAL at 08:27

## 2023-05-29 RX ADMIN — FAMOTIDINE 20 MG: 20 TABLET ORAL at 08:27

## 2023-05-29 RX ADMIN — METFORMIN HYDROCHLORIDE 1000 MG: 500 TABLET, EXTENDED RELEASE ORAL at 17:30

## 2023-05-29 RX ADMIN — PENTOXIFYLLINE 400 MG: 400 TABLET, EXTENDED RELEASE ORAL at 11:57

## 2023-05-29 RX ADMIN — CEFTRIAXONE SODIUM 1 G: 1 INJECTION, POWDER, FOR SOLUTION INTRAMUSCULAR; INTRAVENOUS at 20:47

## 2023-05-29 RX ADMIN — METHADONE HYDROCHLORIDE 10 MG: 10 TABLET ORAL at 15:54

## 2023-05-29 RX ADMIN — HYDROMORPHONE HYDROCHLORIDE 4 MG: 2 TABLET ORAL at 11:58

## 2023-05-29 RX ADMIN — Medication 400 MG: at 09:50

## 2023-05-29 RX ADMIN — PENTOXIFYLLINE 400 MG: 400 TABLET, EXTENDED RELEASE ORAL at 17:30

## 2023-05-29 RX ADMIN — METHADONE HYDROCHLORIDE 5 MG: 5 TABLET ORAL at 20:46

## 2023-05-29 RX ADMIN — SIMVASTATIN 20 MG: 20 TABLET, FILM COATED ORAL at 21:46

## 2023-05-29 ASSESSMENT — ACTIVITIES OF DAILY LIVING (ADL)
ADLS_ACUITY_SCORE: 39
ADLS_ACUITY_SCORE: 35
ADLS_ACUITY_SCORE: 39
ADLS_ACUITY_SCORE: 35
ADLS_ACUITY_SCORE: 39
ADLS_ACUITY_SCORE: 39
ADLS_ACUITY_SCORE: 35
ADLS_ACUITY_SCORE: 39
ADLS_ACUITY_SCORE: 35
ADLS_ACUITY_SCORE: 35

## 2023-05-29 NOTE — PLAN OF CARE
Goal Outcome Evaluation:      Plan of Care Reviewed With: patient    Overall Patient Progress: improvingOverall Patient Progress: improving     Vitals stable, Pt is A&Ox4. Lung sounds clear/diminished. Warfarin on hold. Good urine output via catheter, urine is dark in color. Dressings remain CDI. Pt continues with +3 edema. Mag protocol will be ran in the AM. Pt has been repositioned throughout shift. Cath cares done. BS at 0200 was 190.

## 2023-05-29 NOTE — PLAN OF CARE
Goal Outcome Evaluation:      Plan of Care Reviewed With: patient    Overall Patient Progress: improvingOverall Patient Progress: improving      Neuro: alert and oriented x4  CMS: chronic neuropathy in arms/hands feet/legs. >5sec bilateral LE cap refill   Pulmonary: lung sounds clear throughout, on room air  CV: afib controlled, telemetry discontinued at transfer   GI: bowels active. Eating 25-50% of meals. No bowel movement. encouraged glucerna snacks to increase protein intake.   : jung catheter patent. Urine continues to clear up slowly.   Skin: no change. Dressings changed to buttock.   Pain: methadone for chronic pain. Started prn oral dilaudid for dressing changes and activity out of bed  Activity: up with assist of 1 walker/GB to chair. Used step stool to help with reposition in chair. Used chair reposition sheet today which worked wonderfully.   Lines/Tubes/Drains: double lumen PICC     Patient is very compliant with cares. Understands the importance of repositioning and getting out of bed. Actively helps as able with wound cares and repositioning.

## 2023-05-29 NOTE — PHARMACY-ANTICOAGULATION SERVICE
Clinical Pharmacy - Warfarin Dosing Consult     Pharmacy has been consulted to manage this patient s warfarin therapy.  Indication: Atrial Fibrillation  Therapy Goal: INR 2-3  Warfarin Prior to Admission: Yes  Warfarin PTA Regimen: 2.5 mg on Mon, Wed, Fri and 3 mg all other days of the week  Significant drug interactions: Zosyn    INR   Date Value Ref Range Status   05/29/2023 3.15 (H) 0.85 - 1.15 Final   05/28/2023 5.57 (HH) 0.85 - 1.15 Final     Warfarin has been on Hold for 5 days (last dose on 5/23)    INR just above therapeutic range, so will restart at a lower dose today.    Recommend warfarin 1 mg today.  Pharmacy will monitor Linda Loredo daily and order warfarin doses to achieve specified goal.      Please contact pharmacy as soon as possible if the warfarin needs to be held for a procedure or if the warfarin goals change.

## 2023-05-29 NOTE — PROGRESS NOTES
Primary RN from [phone]    IV Vanco infusing.  Urine dark and cloudy.  Scheduled Methadone given.  No other acute changes

## 2023-05-29 NOTE — PROGRESS NOTES
[facility]    Medicine Progress Note - Hospitalist Service    Date of Admission:  5/26/2023    Assessment & Plan     Patient is a 78-year-old female with past medical history of type 2 diabetes, hypertension, hyperlipidemia, morbid obesity, COPD, paroxysmal atrial fibrillation on Coumadin, primary hyperparathyroidism, depression, BERLIN who was hospitalized earlier this month at [place] due to cellulitis from decubitus ulcers in the buttocks and perineal/upper thigh region who improved on IV Rocephin and was discharged to [facility] TCU with ongoing oral cefazolin for 10-day antibiotic course completion.  Patient cellulitis and sores initially improved however in recent days there had been concern from the nursing staff at the TCU that they are becoming reinfected and patient was brought back to the emergency room where she was found to have sepsis secondary to recurrent cellulitis surrounding her buttocks wounds and wounds in bilateral lower extremities and she was admitted and placed on Zosyn and vancomycin.  Due to difficulty in finding/maintaining IV access in patient with repeat hypotension requiring IV fluids and possible need of vasopressors a PICC line was placed and patient was placed intermediate overflow status in the ICU for close monitoring.      Patient has progressively improved and she transitioned to the [facility] floor on 5/28/2023 with cellulitis improving even after patient was transition to Ancef and vancomycin.   Today antibiotics have been transitioned to Rocephin and vancomycin as UTI has grown out Enterobacter which is resistant to Ancef but sensitive to Rocephin.  Anticipate possible transition to oral antibiotics tomorrow if patient continues to clinically improve.  Physical therapy is recommending return to TCU but patient's goal is to find a TCU closer to her home and referrals are pending with anticipated discharge in 1 to 2 days.     Principal  Problem:    Sepsis without acute organ dysfunction, due to unspecified organism (H)    Cellulitis of buttock    Cellulitis - bilateral lower extremities    Assessment: Patient presented with with mild hypotension responsive to fluids, lactic acid of 4.4, worsening cellulitis surrounding chronic wounds.  Improving on Ancef and vancomycin with sepsis resolving and cellulitis notably improved.  Now transitioned to Rocephin and vanco give UTI as below for better coverage of Enterobacter species.  PICC line in place due to difficulty with IV access.    Plan:   -Continue Rocephin and vancomycin  -Wound care nurse to evaluate patient when next available on 5/30/2023  -Monitor serial white blood cell count and CRP  -Possible narrowing of antibiotics to oral regimen in 1 to 2 days as patient continues to clinically improve.  -Social work to continue working on TCU placement at discharge    Active Problems:     Acute cystitis with hematuria    Assessment: Patient has chronic indwelling Braga catheter in place that was inserted during her previous hospital stay in an effort to allow ulcers to heal more rapidly.  Braga has been exchanged in the emergency department but there is concern for possible UTI based on UA results from postexchange Braga.  Urine culture showing Entrobacter sensitive to Rocephin    Plan:   -Continue with Rocephin for ongoing coverage      GUSTAVO (acute kidney injury) (H)    Assessment: Creatinine of 1.24 at presentation with previous baseline of 0.7-0.8, thought secondary to dehydration and sepsis.  Has slightly improved to 1.10 today with ongoing fluids.  Patient is having adequate urinary output with potassium normal and ongoing significantly elevated BUN to creatinine ratio    Plan:   -Continue IV fluids at maintenance dosing  -Recheck creatinine tomorrow to ensure ongoing improvement      Dehydration    Assessment: Suspected at time of admission, likely contributing to acute kidney injury as above, no  resolved    Plan:   -Continue to monitor for ongoing resolution      Decubitus ulcers - 5 present on buttocks/perineal region, numerous scabbed over and unstagable on shin and bilateral feet with some bloody blisters noted on feet    Assessment: Patient had known ulcerations upon arrival to [place] earlier this month and wounds had apparently been closing while at the TCU environment but have worsened in the last week prior to admission for reasons unclear    Plan:   -Continue with Braga catheter to aid in healing of extensive ulcerations  -Continue current wound care but will have wound care nurse see patient on 5/30/2021 following holiday weekend  -Continue with antibiotics for treatment of acute cellulitis as above      Type 2 diabetes mellitus with complication, with long-term current use of insulin (H)    Hypoglycemia    Assessment: Patient is normally on metformin XR 2000 mg daily in addition to sliding scale insulin.  Blood sugars were apparently in the 600 range at the TCU prior to transfer to the ED and patient was given an unknown amount of insulin prior to transfer.  On arrival blood sugar was actually 65 but improved to 71 without intervention and has now increased into the low to mid 100 range.  Home metformin has been held    Plan:   -Continue to hold metformin but would restart it as patient's oral intake continues to improve  -Provide medium resistance sliding scale for hyperglycemia      Essential hypertension with goal blood pressure less than 140/90    Assessment: Patient is normally on lisinopril 10 mg daily.  Was transiently held due to persistent hypotension but blood pressures are now consistently elevated and lisinopril has been restarted with blood pressures improving into normal range once more    Plan:   -Continue home lisinopril      Mixed hyperlipidemia    Assessment: On simvastatin at baseline    Plan:   -Continue simvastatin 20 mg daily as per home regimen       OBESITY     Assessment: Patient has BMI of 42    Plan:   -Continue supportive cares      Diabetic polyneuropathy associated with type 2 diabetes mellitus (H)    Assessment: Patient on previously has been on Lyrica for this condition however that was stopped at unknown timeframe and patient has been managing chronic pain with methadone    Plan:   -Continue chronic methadone dosing      Hypersomnia with sleep apnea    Assessment: Patient is supposed to be using CPAP device    Plan:   -We will supplement with oxygen as needed for hypoxemia with sleep      COPD (chronic obstructive pulmonary disease) (H)    Assessment: Previous documented history but details are unknown and patient does not appear to be on any long-acting or rescue medications for this.  No evidence of acute exacerbation    Plan:   -Continue to monitor respiratory status closely      Paroxysmal atrial fibrillation (H)    Assessment: Previous history however patient is not on any rate controlling medications, does take Coumadin for anticoagulation purposes with supratherapeutic INR is below    Plan:   -Continue monitor heart rate on routine vitals  -Appreciate pharmacy's assistance in Coumadin management during the stay      Primary hyperparathyroidism (H)    Assessment: Previously diagnosed, however there has been debate as to whether or not she is truly hyperparathyroid or has severe vitamin D deficiency.      Plan:   -No acute intervention is needed during this hospital stay      Chronic pain    Assessment: Patient is on methadone 5 mg at 8:00, 5 mg at noon, 10 mg at 3 PM, and 5 mg before bedtime    Plan:   -We will restart home regimen and allow for as needed breakthrough pain given acute cause for pain as outlined above      Warfarin-induced coagulopathy (H)    Supratherapeutic INR    Assessment: INR was elevated with peak of 6.83 and has decreased to 3.15 today.  Patient does have small amount of hematuria present in her Braga catheter which appears to be  "lessening.  Hematuria likely related to acute urinary tract infection and supratherapeutic INR and is lessening today with very minimal hematuria noted    Plan:   -Pharmacy inpatient team to assist in Coumadin management  -Nursing will continue to monitor mild hematuria and inform if it is worsening or if significant blood clots are present  -Nursing will also inform if other acute bleeding is noted       Diet: Combination Diet Regular Diet Adult  Snacks/Supplements Adult: Glucerna; Between Meals    DVT Prophylaxis: Warfarin  Braga Catheter: PRESENT, indication: Wound Healing  Lines: PRESENT      PICC 05/26/23 Double Lumen Right Basilic-Site Assessment: WDL      Cardiac Monitoring: None  Code Status: Full Code      Clinically Significant Risk Factors           # Hypercalcemia: corrected calcium is >10.1, will monitor as appropriate    # Hypoalbuminemia: Lowest albumin = 2.3 g/dL at 5/27/2023  5:58 AM, will monitor as appropriate  # Coagulation Defect: INR = 3.15 (Ref range: 0.85 - 1.15) and/or PTT = N/A, will monitor for bleeding  # Thrombocytopenia: Lowest platelets = 116 in last 2 days, will monitor for bleeding   # Hypertension: Noted on problem list       # DMII: A1C = 8.2 % (Ref range: <5.7 %) within past 6 months, PRESENT ON ADMISSION  # Severe Obesity: Estimated body mass index is 41.72 kg/m  as calculated from the following:    Height as of this encounter: 1.676 m (5' 6\").    Weight as of this encounter: 117.3 kg (258 lb 8 oz)., PRESENT ON ADMISSION          Disposition Plan   Anticipate discharge to TCU in 2 to 3 days as patient continues to improve       Velma Coles MD  Hospitalist Service  [facility]  Securely message with [employer] (more info)  Text page via [facility] Paging/Directory   ______________________________________________________________________    Interval History   Patient continues to feel better.  Still has significant tenderness when moving but is " more tolerable and she can feel that the swelling has significantly lessened.  She has been trying very hard to offload and change her positioning and is very motivated to get the source to heal fully.  She has no new symptoms.  No new nursing concerns.    Physical Exam   Vital Signs: Temp: 98.3  F (36.8  C) Temp src: Oral BP: 134/81 Pulse: 76   Resp: 20 SpO2: 98 % O2 Device: None (Room air)    Weight: 258 lbs 8 oz    Constitutional: awake, alert, cooperative, no apparent distress, and appears stated age  Respiratory: No increased work of breathing, good air exchange, clear to auscultation bilaterally, no crackles or wheezing  Cardiovascular: Regular rate and rhythm without murmur  GI: Bowel sounds are present, abdomen is obese but soft  Skin: Decubitus ulcers were visualized again today and the ulcers themselves are overall unchanged however the surrounding erythema and swelling is notably better which makes the ulcers themselves look smaller and less deep than they did 2 days ago.  Cellulitis of the buttocks and perineal region as well as bilateral lower legs is remarkably improved with erythema covering much less of the skin and significant reduction in warmth and swelling present  Musculoskeletal: Ongoing lymphedema in bilateral lower extremities, ongoing recovering cellulitis as above  Neurologic: Awake, alert, oriented to name, place and situation    Medical Decision Making       55 MINUTES SPENT BY ME on the date of service doing chart review, history, exam, documentation & further activities per the note.      Data     I have personally reviewed the following data over the past 24 hrs:    6.6  \   10.7 (L)   / 128 (L)     139 103 25.7 (H) /  228 (H)   4.1 27 1.10 (H) \       ALT: 6 (L) AST: 22 AP: 75 TBILI: 0.2   ALB: 2.8 (L) TOT PROTEIN: 6.3 (L) LIPASE: N/A       Procal: N/A CRP: 31.17 (H) Lactic Acid: N/A       INR:  3.15 (H) PTT:  N/A   D-dimer:  N/A Fibrinogen:  N/A

## 2023-05-29 NOTE — PHARMACY-VANCOMYCIN DOSING SERVICE
Pharmacy Vancomycin Note  Date of Service May 29, 2023  Patient's  1945   78 year old, female    Indication: Sepsis and Skin and Soft Tissue Infection  Day of Therapy: 3  Current vancomycin regimen:  1000 mg IV q24h  Current vancomycin monitoring method: AUC  Current vancomycin therapeutic monitoring goal: 400-600 mg*h/L    [employer] Prediction of Current Vancomycin Regimen  Regimen: 1000 mg IV every 24 hours.  Start time: 20:32 on 2023  Exposure target: AUC24 (range)400-600 mg/L.hr   AUC24,ss: 403 mg/L.hr  Probability of AUC24 > 400: 51 %  Ctrough,ss: 12.8 mg/L  Probability of Ctrough,ss > 20: 21 %  Probability of nephrotoxicity (Lodise COSTA ): 8 %      Current estimated CrCl = Estimated Creatinine Clearance: 54.9 mL/min (A) (based on SCr of 1.1 mg/dL (H)).    Creatinine for last 3 days  2023:  3:08 PM Creatinine 1.24 mg/dL  2023:  5:58 AM Creatinine 1.16 mg/dL  2023:  6:05 AM Creatinine 1.15 mg/dL  2023:  5:18 AM Creatinine 1.10 mg/dL    Recent Vancomycin Levels (past 3 days)  2023:  5:49 AM Vancomycin 15.1 ug/mL    Vancomycin IV Administrations (past 72 hours)                   vancomycin (VANCOCIN) 1000 mg in dextrose 5% 200 mL PREMIX (mg) 1,000 mg New Bag 23     1,000 mg New Bag 23    vancomycin (VANCOCIN) 1,500 mg in sodium chloride 0.9 % 250 mL intermittent infusion (mg) 1,500 mg New Bag 23 191                Nephrotoxins and other renal medications (From now, onward)    Start     Dose/Rate Route Frequency Ordered Stop    23 1030  lisinopril (ZESTRIL) tablet 10 mg         10 mg Oral DAILY 23 1020      23 1900  vancomycin (VANCOCIN) 1000 mg in dextrose 5% 200 mL PREMIX         1,000 mg  200 mL/hr over 1 Hours Intravenous EVERY 24 HOURS 23 1251               Contrast Orders - past 72 hours (72h ago, onward)    None          Interpretation of levels and current regimen:  Vancomycin level is reflective of AUC  400-600    Has serum creatinine changed greater than 50% in last 72 hours: No    Urine output:  unable to determine    Renal Function: Improving    [employer]ight[employer] Prediction of Planned New Vancomycin Regimen  Loading dose: N/A  Regimen: 1250 mg IV every 24 hours.  Start time: 20:32 on 05/29/2023  Exposure target: AUC24 (range)400-600 mg/L.hr   AUC24,ss: 496 mg/L.hr  Probability of AUC24 > 400: 76 %  Ctrough,ss: 15.9 mg/L  Probability of Ctrough,ss > 20: 33 %  Probability of nephrotoxicity (Lodise COSTA 2009): 11 %    Plan:  1. Increase Dose to 1250 mg IV q24h  2. Vancomycin monitoring method: AUC  3. Vancomycin therapeutic monitoring goal: 400-600 mg*h/L  4. Pharmacy will check vancomycin levels as appropriate in 3-5 Days.  5. Serum creatinine levels will be ordered daily for the first week of therapy and at least twice weekly for subsequent weeks.    Miguel Pressley RPH

## 2023-05-29 NOTE — PLAN OF CARE
"Goal Outcome Evaluation:      Plan of Care Reviewed With: patient    Overall Patient Progress: improvingOverall Patient Progress: improving    Outcome Evaluation: Pt is A&Ox4; VSS. LS clear/diminished. Dressning changed X1. PRN Oral Dilaudid given for pain of dressing change. IV Vanco given. Good urine output through jung. Pt up in chair for dinner. Good oral intake. Insulin coverage given per sliding scale. BGs of 165, 197 and 228. PICC saline locked and blood return noted. Warfarin restarted. Mag replaced and AM recheck.     /65 (BP Location: Left arm)   Pulse 72   Temp 97.9  F (36.6  C) (Oral)   Resp 18   Ht 1.676 m (5' 6\")   Wt 117.3 kg (258 lb 8 oz)   SpO2 94%   BMI 41.72 kg/m          "

## 2023-05-30 ENCOUNTER — DOCUMENTATION ONLY (OUTPATIENT)
Dept: OTHER | Facility: CLINIC | Age: 78
End: 2023-05-30
Payer: COMMERCIAL

## 2023-05-30 ENCOUNTER — HOSPITAL ENCOUNTER (INPATIENT)
Dept: WOUND CARE | Facility: CLINIC | Age: 78
Discharge: HOME | DRG: 872 | End: 2023-05-30
Attending: FAMILY MEDICINE
Payer: COMMERCIAL

## 2023-05-30 ENCOUNTER — APPOINTMENT (OUTPATIENT)
Dept: PHYSICAL THERAPY | Facility: CLINIC | Age: 78
DRG: 872 | End: 2023-05-30
Attending: FAMILY MEDICINE
Payer: COMMERCIAL

## 2023-05-30 LAB
ANION GAP SERPL CALCULATED.3IONS-SCNC: 8 MMOL/L (ref 7–15)
BUN SERPL-MCNC: 22.1 MG/DL (ref 8–23)
CALCIUM SERPL-MCNC: 9.6 MG/DL (ref 8.8–10.2)
CHLORIDE SERPL-SCNC: 104 MMOL/L (ref 98–107)
CREAT SERPL-MCNC: 1.07 MG/DL (ref 0.51–0.95)
CRP SERPL-MCNC: 20.12 MG/L
DEPRECATED HCO3 PLAS-SCNC: 28 MMOL/L (ref 22–29)
ERYTHROCYTE [DISTWIDTH] IN BLOOD BY AUTOMATED COUNT: 13.5 % (ref 10–15)
GFR SERPL CREATININE-BSD FRML MDRD: 53 ML/MIN/1.73M2
GLUCOSE BLDC GLUCOMTR-MCNC: 162 MG/DL (ref 70–99)
GLUCOSE BLDC GLUCOMTR-MCNC: 170 MG/DL (ref 70–99)
GLUCOSE BLDC GLUCOMTR-MCNC: 177 MG/DL (ref 70–99)
GLUCOSE BLDC GLUCOMTR-MCNC: 199 MG/DL (ref 70–99)
GLUCOSE BLDC GLUCOMTR-MCNC: 202 MG/DL (ref 70–99)
GLUCOSE SERPL-MCNC: 192 MG/DL (ref 70–99)
HCT VFR BLD AUTO: 32.9 % (ref 35–47)
HGB BLD-MCNC: 10.5 G/DL (ref 11.7–15.7)
INR PPP: 2.11 (ref 0.85–1.15)
MAGNESIUM SERPL-MCNC: 1.8 MG/DL (ref 1.7–2.3)
MCH RBC QN AUTO: 30.2 PG (ref 26.5–33)
MCHC RBC AUTO-ENTMCNC: 31.9 G/DL (ref 31.5–36.5)
MCV RBC AUTO: 95 FL (ref 78–100)
PLATELET # BLD AUTO: 126 10E3/UL (ref 150–450)
POTASSIUM SERPL-SCNC: 4.2 MMOL/L (ref 3.4–5.3)
RBC # BLD AUTO: 3.48 10E6/UL (ref 3.8–5.2)
SODIUM SERPL-SCNC: 140 MMOL/L (ref 136–145)
WBC # BLD AUTO: 7.1 10E3/UL (ref 4–11)

## 2023-05-30 PROCEDURE — 99232 SBSQ HOSP IP/OBS MODERATE 35: CPT | Performed by: FAMILY MEDICINE

## 2023-05-30 PROCEDURE — 97602 WOUND(S) CARE NON-SELECTIVE: CPT

## 2023-05-30 PROCEDURE — 85027 COMPLETE CBC AUTOMATED: CPT | Performed by: FAMILY MEDICINE

## 2023-05-30 PROCEDURE — G0463 HOSPITAL OUTPT CLINIC VISIT: HCPCS | Mod: 25

## 2023-05-30 PROCEDURE — 250N000013 HC RX MED GY IP 250 OP 250 PS 637: Performed by: FAMILY MEDICINE

## 2023-05-30 PROCEDURE — 97110 THERAPEUTIC EXERCISES: CPT | Mod: GP | Performed by: PHYSICAL THERAPIST

## 2023-05-30 PROCEDURE — 80048 BASIC METABOLIC PNL TOTAL CA: CPT | Performed by: FAMILY MEDICINE

## 2023-05-30 PROCEDURE — 86140 C-REACTIVE PROTEIN: CPT | Performed by: FAMILY MEDICINE

## 2023-05-30 PROCEDURE — 250N000013 HC RX MED GY IP 250 OP 250 PS 637: Performed by: INTERNAL MEDICINE

## 2023-05-30 PROCEDURE — 120N000001 HC R&B MED SURG/OB

## 2023-05-30 PROCEDURE — 85610 PROTHROMBIN TIME: CPT | Performed by: INTERNAL MEDICINE

## 2023-05-30 PROCEDURE — 83735 ASSAY OF MAGNESIUM: CPT | Performed by: FAMILY MEDICINE

## 2023-05-30 RX ORDER — MAGNESIUM OXIDE 400 MG/1
400 TABLET ORAL EVERY 4 HOURS
Status: COMPLETED | OUTPATIENT
Start: 2023-05-30 | End: 2023-05-30

## 2023-05-30 RX ORDER — SULFAMETHOXAZOLE/TRIMETHOPRIM 800-160 MG
1 TABLET ORAL 2 TIMES DAILY
Status: DISCONTINUED | OUTPATIENT
Start: 2023-05-30 | End: 2023-05-31 | Stop reason: HOSPADM

## 2023-05-30 RX ORDER — METFORMIN HCL 500 MG
1000 TABLET, EXTENDED RELEASE 24 HR ORAL
Status: DISCONTINUED | OUTPATIENT
Start: 2023-05-30 | End: 2023-05-30

## 2023-05-30 RX ADMIN — METFORMIN HYDROCHLORIDE 1000 MG: 500 TABLET, EXTENDED RELEASE ORAL at 17:29

## 2023-05-30 RX ADMIN — PENTOXIFYLLINE 400 MG: 400 TABLET, EXTENDED RELEASE ORAL at 08:16

## 2023-05-30 RX ADMIN — Medication 400 MG: at 12:12

## 2023-05-30 RX ADMIN — MICONAZOLE NITRATE: 20 POWDER TOPICAL at 08:21

## 2023-05-30 RX ADMIN — METHADONE HYDROCHLORIDE 5 MG: 5 TABLET ORAL at 12:12

## 2023-05-30 RX ADMIN — METHADONE HYDROCHLORIDE 10 MG: 10 TABLET ORAL at 15:16

## 2023-05-30 RX ADMIN — MICONAZOLE NITRATE: 20 POWDER TOPICAL at 21:16

## 2023-05-30 RX ADMIN — SIMVASTATIN 20 MG: 20 TABLET, FILM COATED ORAL at 21:13

## 2023-05-30 RX ADMIN — FAMOTIDINE 20 MG: 20 TABLET ORAL at 21:13

## 2023-05-30 RX ADMIN — PENTOXIFYLLINE 400 MG: 400 TABLET, EXTENDED RELEASE ORAL at 12:11

## 2023-05-30 RX ADMIN — WARFARIN SODIUM 1.25 MG: 2.5 TABLET ORAL at 17:29

## 2023-05-30 RX ADMIN — SULFAMETHOXAZOLE AND TRIMETHOPRIM 1 TABLET: 800; 160 TABLET ORAL at 10:02

## 2023-05-30 RX ADMIN — PENTOXIFYLLINE 400 MG: 400 TABLET, EXTENDED RELEASE ORAL at 17:29

## 2023-05-30 RX ADMIN — LISINOPRIL 10 MG: 10 TABLET ORAL at 08:17

## 2023-05-30 RX ADMIN — METHADONE HYDROCHLORIDE 5 MG: 5 TABLET ORAL at 08:18

## 2023-05-30 RX ADMIN — HYDROMORPHONE HYDROCHLORIDE 4 MG: 2 TABLET ORAL at 13:27

## 2023-05-30 RX ADMIN — METHADONE HYDROCHLORIDE 5 MG: 5 TABLET ORAL at 21:13

## 2023-05-30 RX ADMIN — SULFAMETHOXAZOLE AND TRIMETHOPRIM 1 TABLET: 800; 160 TABLET ORAL at 21:13

## 2023-05-30 RX ADMIN — FAMOTIDINE 20 MG: 20 TABLET ORAL at 08:17

## 2023-05-30 RX ADMIN — Medication 400 MG: at 08:17

## 2023-05-30 ASSESSMENT — ACTIVITIES OF DAILY LIVING (ADL)
ADLS_ACUITY_SCORE: 39
DEPENDENT_IADLS:: CLEANING;COOKING;LAUNDRY;SHOPPING;MEAL PREPARATION;MEDICATION MANAGEMENT;TRANSPORTATION;INCONTINENCE
ADLS_ACUITY_SCORE: 39

## 2023-05-30 NOTE — PLAN OF CARE
Goal Outcome Evaluation:      Plan of Care Reviewed With: patient    Overall Patient Progress: improvingOverall Patient Progress: improving    Outcome Evaluation: patient A&O, vss, blood glucose 162, repositioning for comfort, WOC nurse to see patient 5/30, strict I&O, labs drawn from PICC

## 2023-05-30 NOTE — PHARMACY-ANTICOAGULATION SERVICE
Clinical Pharmacy - Warfarin Dosing Consult     Pharmacy has been consulted to manage this patient s warfarin therapy.  Indication: Atrial Fibrillation  Therapy Goal: INR 2-3  Warfarin Prior to Admission: Yes  Warfarin PTA Regimen: 2.5 mg on Mon, Wed, Fri and 3 mg all other days of the week  Significant drug interactions: Bactrim    INR   Date Value Ref Range Status   05/30/2023 2.11 (H) 0.85 - 1.15 Final   05/29/2023 3.15 (H) 0.85 - 1.15 Final       Recommend warfarin 1.25 mg today which is 50% of home dose (and dose rounded due to availability in the pharmacy) given new start bactrim for enterobacter & SSTI dual coverage.  Pharmacy will monitor Linda Browernigan daily and order warfarin doses to achieve specified goal.      Please contact pharmacy as soon as possible if the warfarin needs to be held for a procedure or if the warfarin goals change.      Thank you,   Rose Chacon [facility] on 5/30/2023 at 10:01 AM

## 2023-05-30 NOTE — PLAN OF CARE
Goal Outcome Evaluation:      Plan of Care Reviewed With: patient    Overall Patient Progress: improvingOverall Patient Progress: improving     Vitals stable, Pain 6/10. Dressings remain CDI. Nystatin powder applied to groin and abdominal folds. Cath care complete. Warfarin on hold. , novolog held. IV Rocephin given.

## 2023-05-30 NOTE — PLAN OF CARE
Goal Outcome Evaluation:      Plan of Care Reviewed With: patient          Outcome Evaluation: Pt is A&Ox4 VSS afebrile. Dressings in place and C/D/I. on scheduled methadone for pain anagement. Pt repos self while in bed. encouraging pt to chair this shift. Braga in place for wound healing. Mag to be replaced tomorrow per previous shift nurse.

## 2023-05-30 NOTE — PROGRESS NOTES
[facility] Ost[facility] done as inpatient:    Reason For Visit: Linda Loredo, 78 year old female, seen for a WOC consultation to evaluate and treat buttock/sreekanth area wound, lower leg and foot wounds.  Patient was admitted on 5/26/23 for sepsis, cellulitis, UTI, GUSTAVO, wounds.  Pt's nurse Lyndsey GARCIA is also present with patient in the room.  Patient is A&Ox4, pleasant upon interaction.     Start of Care in [facility]: 5/30/2023   Referring Provider: Dr EMILY Coles MD  Primary Care Provider: Ish Brown   Wound Location: Right buttock/posterior thigh.  and left buttock, thigh.  Lower legs.  Left foot plantar lateral and medial distal.        Subjective:  Pt with interview during visit today added to the Wound History below.  She reports her concerns and pain are the buttock and thigh wounds.  Feels her lower leg wounds related to swelling are improving and not of significant concern now.  Left plantar foot wound she is unclear what is best options ongoing.       Wound History:   Note on wounds origin and status initial woc nurse visit inpatient here at [facility].  Chart review of most recent hospitalization done with some additional questions not fully clear.  Woc nurse services here will continue to review past documentation to better define wound origins and history.  Pt has a past medical history of wound which includes a significant left foot plantar heel diabetic ulcer that was treated with wound vac over long course and is currently closed.  Past history also of additional wounds related to diabetes to her feet and toes, venous ulcers of her lower legs.  For lower leg edema control the pt reports in the past she was able to wear compression garments bilaterally and wounds healed.  She had open wounds to her lower legs when she was discharge to TCU on 5/8/23, treated with topical dressings and ace wraps for compression, these have dried  to current status.   Buttocks wounds:  The pt presented to the ED at the [facility] and was admitted 5/2/23 - 5/8/23.  Documentation from that admission including wound care assessment states the following active wounds: Right buttock to thigh large unstageable pressure ulcer mostly slough covered with signs of cellulitis infection.  Bilateral lower leg venous ulcers.  Left medial foot, left 2nd toe and left 5th toe diabetic ulcers, all debrided on 5/3/23 to clean wound beds.  Pt was discharged to TCU on 5/8/23 with oral antibiotics for infection.  The pt she states at our initial inpatient visit 5/30/23, that on her admission to the TCU at [facility], she believes she had two pressure sores on her buttocks and now believes there are five, she was discharged .  Pt presented to the ED here at [facility] on 5/26/23, she was admitted with sepsis, cellulitis of the buttocks and legs, wound infections of the buttocks and additional wounds as listed below.     Past Medical History:  Patient Active Problem List   Diagnosis    Type 2 diabetes mellitus with complication, with long-term current use of insulin (H)    DEPRESSION    Essential hypertension with goal blood pressure less than 140/90    combination of stress and urge URINARY INCONTINENCE    Herpes zoster    Insomnia    Mixed hyperlipidemia     OBESITY    Diabetic polyneuropathy associated with type 2 diabetes mellitus (H)    Routine general medical examination at a health care facility    Generalized osteoarthrosis, unspecified site    Hypersomnia with sleep apnea    Fibromyalgia    Anxiety    Chronic kidney disease    Elevated liver enzymes    Fracture of fibula, distal, left, closed    Hypotension    Abscess of left foot    Allergic rhinitis    Anticoagulation monitoring, INR range 2-3    Bilateral lower extremity edema    COPD (chronic obstructive pulmonary disease) (H)    Diabetic ulcer of left heel associated with type 2 diabetes  mellitus, with muscle involvement without evidence of necrosis (H)    Major depressive disorder, recurrent episode, moderate (H)    Microalbuminuria due to type 2 diabetes mellitus (H)    Paroxysmal atrial fibrillation (H)    Osteopenia    Obesity, Class III, BMI 40-49.9 (morbid obesity) (H)    Mixed hyperlipidemia due to type 2 diabetes mellitus (H)    Vitamin D deficiency    Venous stasis ulcers of both lower extremities (H)    Umbilical hernia without obstruction and without gangrene    Systolic murmur    Primary hyperparathyroidism (H)    Chronic pain    Cellulitis of buttock    Sepsis without acute organ dysfunction, due to unspecified organism (H)    UTI (urinary tract infection) - possible    GUSTAVO (acute kidney injury) (H)    Dehydration    Cellulitis - bilateral lower extremities    Decubitus ulcers - 5 present on buttocks/perineal region, numerous scabbed over and unstagable on shin and bilateral feet with some bloody blisters noted on feet    Warfarin-induced coagulopathy (H)    Hypoglycemia    Supratherapeutic INR                Tobacco Use:     Tobacco Use      Smoking status: Never      Smokeless tobacco: Never    Vaping Use      Vaping status: None       Diabetic: type 2  HgbA1C:   Hemoglobin A1C   Date Value Ref Range Status   05/27/2023 8.2 (H) <5.7 % Final     Comment:     Normal <5.7%   Prediabetes 5.7-6.4%    Diabetes 6.5% or higher     Note: Adopted from ADA consensus guidelines.   12/19/2007 10.0 (H) 4.3 - 6.0 % Final   Checks Blood Glucose?:  See Hospital flow sheet for inpatient results.    Personal/social history:  Pt was admitted to [facility] from TCU at [facility]. Prior to her hospital admission to [facility] on 5/2/23 the pt was living alone in private home, home care had been ordered but unclear if services had began.      Objective:   Current treatment plan: Buttocks and posterior thigh ulcers: Triad paste covered with Mepilex gentle adhesive foam dressings.  Last changed:  This am    Wound #1 Right buttock/posterior thigh.  Stage/tissue depth: unstageable pressure injury, full thickness (see Assessment for more details).  Site consists of a large area of nonblanchable erythema with increased warmth to touch, with three distinct open wounds, from the lower right buttock to the proximal posterior thigh.  Total area 25 cm L x 9 cm W x 0.2 cm D  Open aspects:  - Right Ischial tuberosity 2 cm L x 1.8 cm W x 0.1 cm D, wound bed is 100 % red nongranular tissue, moderate amounts serous drainage.  - Right inferior buttock/posterior thigh fold 6 cm L x 4 cm W x 0.1 cm D, wound bed is approximately 70 % red nongranular tissue and 30 % semi moist nonviable dermal tissue, moderate to large amounts serous drainage.  - Right posterior thigh 5.5 cm L x 7 cm W x 0.2 cm D, wound bed is approximately 30 % granular tissue, 50 % red nongranular tissue and 20 % grey eschar (looks more white colored in picture due to Triad paste), large amounts serosanguinous and purulent yellow drainage.  Tunneling: none  Undermining: none  Wound bed type/amount: As listed above for each site; Is fluctuant  Wound Edges: open where there is depth  Periwound: blanchable erythema beyond the total size of the wound and edema.  Drainage: as listed above for each site  Odor: none ntoed  Pain: 10/10 with cares today, posterior thigh wound being the most painful.    Photo's from today's visit 5/30/23, initial inpatient woc nurse assessment.    Wound #2 Left Ischial tuberosity.  Stage/tissue depth: stage 2 pressure injury, partial thickness  2 cm L x 2 cm W x 0 cm D  Tunneling: none  Undermining: none  Wound bed type/amount: 100 % red nongranular tissue; Not fluctuant  Wound Edges: NA no depth  Periwound: blanchable erythema with no increased warmth to touch  Drainage: small to moderate amounts serous.  Odor: none noted  Pain: 10/10 with cares  No photo taken this visit 5/30/23, woc nurse error.    Wound #3 Left inferior  buttock/posterior thigh fold.  Stage/tissue depth: partial thickness, appears more related to intertriginous dermatitis and urine/vaginal drainage related than pressure.  2 cm L x 3.5 cm W x 0 cm D  Tunneling: none  Undermining: none  Wound bed type/amount: approximately 10 % red nongranular, 10 % intact viable dermal tissue and 80 % moist hypopigmented nonviable dermal tissue; Not fluctuant  Wound Edges: NA no depth  Periwound: moist intact skin, scattered blanchable erythema  Drainage: small amounts serous  Odor: none from the wound, sreekanth area odor noted from sreekanth catheter drainage.  Pain: 10/10 with cares    Photo from today's visit 5/30/23, initial inpatient woc nurse assessment.    Wound #4 Right anterior lower leg, venous stasis ulcer.  Stage/tissue depth: unable to confirm but appears full thickness.  Total area of venous dermatitis 14 cm L x 12 cm W x 0 cm D  Large scab of dried drainage and eschar mix 3 cm L x 2.8 cm W x 0 cm D, no drainage present currently.  Tunneling: none noted  Undermining: none noted  Wound bed type/amount: as listed above; NA fluctuant  Wound Edges: scab/eschar edges are lifted, this also covers any open edges potentially below.  Periwound: edema, erythema with no increased warmth to touch.  Drainage: none  Odor: no  Pain: tender to touch but not painful  Photo from today's visit 5/30/23, initial inpatient woc nurse assessment.    Wound #5 Left foot plantar aspect at base of 5th MTH.  Stage/tissue depth: full thickness, origin debateable but likely mix of diabetic and pressure injury.  2.5 cm L x 2.5 cm W x 0 cm D  Tunneling: none visible  Undermining: none visible  Wound bed type/amount: 100 % intact dark sanguinous filled blister raised approximately 0.5 cm; Not fluctuant  Wound Edges: NA  Periwound: edema  Drainage: none  Odor: no  Pain: denies pain at site  Photo from today's visit 5/30/23, initial inpatient woc nurse assessment.    Wound #6 Left foot, medial aspect 1st  [facility].  Stage/tissue depth: full thickness diabetic ulcer  1.5 cm L x 2 cm W x 0 cm D  Tunneling: none  Undermining: none  Wound bed type/amount: approximately 20 % dry black eschar, 20 % intact skin and 60 % hyperkeratotic tissue; Not fluctuant  Wound Edges: closed with eschar and hyperkeratotic tissue  Periwound: edema  Drainage: none  Odor: no  Pain: denies pain at site, tender with cares  Photo from today's visit 5/30/23, initial inpatient woc nurse assessment.    Dorsalis Pedal Pulse: weak but palpable: NA doppler: NA phasic  Hair growth: diminished knees distally  Capillary Refill: 3 seconds  Feet/toes color: pale pink  Nails: wnl  R Leg: Edema plus 3 pitting. Ankle circumference NA cm. Calf circumference NA cm.  L Leg: Edema plus 3 pitting. Ankle circumference NA cm. Calf circumference NA cm.    Mobility: limited  Current offloading/footwear: not assessed this visit  Sensation: peripheral neuropathy  Other callousing/areas of concern:  - Left heel, closed diabetic ulcer, was full thickness. 100 % covered with dry callous and hyperkeratotic tissue.  No drainage.  Photo from today's visit 5/30/23, initial woc nurse inpatient assessment.    Diet: diabetic     Discussed: etiology of wound (Pressure, venous edema, diabetes), pathophysiology and patient specific goals for wound healing.   Education: Role of woc nurse in hospital setting, rational for recommend plan of care    Goals of Wound Healing: For buttocks pressure injuries/  - Transition from unstageable to fully granulating stage of healing  - Maintain moist environment, with Triad paste  - Control exudate, with Triad paste and Mepilex gentle adhesive dressings.  - Autolytic debridement of  necrotic/sloughy tissue with Triad paste  - Protect wound from outside environment, with Triad paste and Mepilex gentle adhesive dressings.  - Antimicrobial Pt is on antibiotics  - Pack open space, with Triad paste where appropriate.  - Promote healing by secondary  intention for complete closure of wound, for full thickness ulcers  - Offloading/pressure reduction, pressure reduction mattress recommendation, pt has pressure reduction cushion for sitting surface at facility    Assessment:  Buttock pressure injuries of the right buttock/posterior thigh.  The area has continuous area of nonblanchable erythema with three distinct open wounds within.  The inferior most wound located on the posterior calf is full thickness and has small amount of eschar remaining, thusly the entire area is measured as one wound today and wound be considered full thickness but unstageable due to the small amount of remaining eschar.  The two other open aspect on the right buttock and right inferior buttock/thigh fold are partial thickness currently.  The nonblanchable erythema present is receeding back from the traced markings, but what remains is noted for an increased in warmth to touch.    The left IT appears to be a new wound since her discharge from [facility].  It is a pressure injury stage 2, all clean, periwound erythema is all blanchable and paler than the deeper aspects noted on the right side.  The left inferior buttock/thigh skin fold has breakdown of dermal tissue but appears more related to intertriginous dermatitis and exposure of sreekanth urethral urine and body fluid drainage, some of which is purulent in appearance.    The right anterior lower leg venous ulcer is stable and dry.    The left foot plantar aspect at base of the 5th MTH has intact blood blister.  Origin is not confirmed, is likely a combination of diabetic and pressure.  Currently all intact with no drainage and no signs of infection.  Blister is soft and appears fluid is re absorbing slowly.  The left foot medial diabetic ulcer of the 1st MTH is all dry and stable.    Barriers to wound healing:   Poor nutrition: inadequate supply of protein, carbohydrates, fatty acids, and trace elements essential for all phases of  wound healing.  Discussed with pt need for protein in diet for healing.  Reduced Blood Supply: inadequate perfusion to heal wound. Pt has ALMA US results in Epic from 5/29/22, results were wnl for both LE, impression adequate arterial flow to promote wound healing.  Medication: NA  Chemotherapy: suppresses the immune system and inflammatory response, NA  Radiotherapy: increases production of free radical which damage cells, NA  Psychological stress: related to painful wounds and unsure placement after hospital discharge  Obesity: decreases tissue perfusion  Infection: prolongs inflammatory phase, uses vital nutrients, impairs epithelialization and releases toxins, cellulitis present, pt is on antibiotics  Underlying Disease: diabetes mellitis, LE edema, lack of ability to reposition self significantly.  Maceration: reduces wound tensile strength and inhibits epithelial migration, at high risk for, addressed with Triad paste today  Patient compliance, TBD appears motivated to heal  Unrelieved pressure, present, will request appropriate pressure reduction mattress  Immobility, limited  Substance abuse: NA    Plan:  Wound cares as follows:    - Cleanse all wounds with soap and water, saline or a no rinse dermal cleanser and dry gently with gauze.    - Buttocks and thigh pressure injuries and intertriginous dermatitis site;  Apply Triad paste to open wounds nickel thick, cover with Mepilex gentle adhesive foam dressings (or similar silicone based gentle adhesive foam dressings), change dressings daily and prn for soiling from internal drainage or external exposure to urine or other body fluids.    - Right lower leg venous stasis ulcer:  Keep clean and dry, resume use of compression garments when available.    - Left plantar foot 5th MTH base blood blister:  Keep clean and dry.  Keep elevated to avoid all pressure and friction.  If site opens recommend to clean and keep covered with dry gauze till provider or wound  specialist can evaluate for new plan of care.  Goal would be to allow to re absorb and not rupture the blister.      - Left foot medial 1st MTH diabetic ulcer:  Keep clean and dry.  Allow eschar and hyperkeratotic tissue to come off on its own over time.    While pt is inpatient here at [facility] nurse services will try to see daily on week days for evaluation and cares.    Interventions to Barriers:  As listed above.    Topical care to wounds as listed in Plan above:   Offloading:   Additional recommendations:  Cares provided as listed above.    Discussed plan of care with patient, her nurse Lyndsey GARCIA and [facility] nurse Nicol murillon. Teaching done with patient, her nurse Lyndsey GARCIA and [facility] nurse Nicol murillon for dressing changes; Pt is unable to do self cares to wounds, will need assist of nursing for all cares.    Discharge instructions updated to include:  Wound cares as listed above.    Supplies: Left additional supplies today in pt's room, Triad paste and Mepilex.  At time of discharge would like to send a weeks works of supplies for buttocks wounds, Mepilex 6x6 and 4x4 dressing and additional Triad paste    [facility] Return visit: Scheduled with Nicol murillon tomorrow 5/31/23.  Nursing to notify Provider and [facility] Nurse if wound deteriorates.    Verbal, written, & demonstrative education provided.  Face to face time (excluding procedure): approximately 45 minutes.  Procedure: less than 1 minute application of Triad paste to one area of remaining eschar of the right buttock/posterior thigh wound. To promote autolytic debridement.  Care plan was not changed.    [phone]

## 2023-05-30 NOTE — PROGRESS NOTES
[facility]    Medicine Progress Note - Hospitalist Service    Date of Admission:  5/26/2023    Assessment & Plan   Patient is a 78-year-old female with past medical history of type 2 diabetes, hypertension, hyperlipidemia, morbid obesity, COPD, paroxysmal atrial fibrillation on Coumadin, primary hyperparathyroidism, depression, [place] who was hospitalized earlier this month at [place] due to cellulitis from decubitus ulcers in the buttocks and perineal/upper thigh region who improved on IV Rocephin and was discharged to [facility]CU with ongoing oral cefazolin for 10-day antibiotic course completion.  Patient cellulitis and sores initially improved however in recent days there had been concern from the nursing staff at the TCU that they are becoming reinfected and patient was brought back to the emergency room where she was found to have sepsis secondary to recurrent cellulitis surrounding her buttocks wounds and wounds in bilateral lower extremities and she was admitted and placed on Zosyn and vancomycin.  Due to difficulty in finding/maintaining IV access in patient with repeat hypotension requiring IV fluids and possible need of vasopressors a PICC line was placed and patient was placed intermediate overflow status in the ICU for close monitoring.      Patient has progressively improved and [facility] with cellulitis improving even after patient was transition to Ancef and Vanco and then further transitioned to Rocephin and vancomycin as UTI has grown out Enterobacter which is resistant to Ancef but sensitive to Rocephin.  Given ongoing clinical improvement, will transition to PO bactrim based on UC sensitivity results and ongoing cellulitis with MRSA risk and this agent should give adequate coverage for both known infections.   Physical therapy is recommending return to TCU but patient's goal is to find a TCU closer to her home and  referrals are pending with anticipated discharge tomorrow if patient continues to improve on oral antibiotics.       Principal Problem:    Sepsis without acute organ dysfunction, due to unspecified organism (H)    Cellulitis of buttock    Cellulitis - bilateral lower extremities    Assessment: Patient presented with with mild hypotension responsive to fluids, lactic acid of 4.4, worsening cellulitis surrounding chronic wounds.  Improving on Rocephin and vancomycin with sepsis resolving and cellulitis notably improved.  PICC line in place due to difficulty with IV access.    Plan:   -Transition to oral Bactrim  -Wound care nurse to evaluate patient today  -Monitor serial white blood cell count and CRP  -Social work to continue working on TCU placement at discharge  -Anticipate discharge tomorrow if TCU placement can be found and cellulitis is improving after transition to oral antibiotics.  PICC line will be removed prior to discharge.    Active Problems:     Acute cystitis with hematuria     UTI due to Indwelling Catheter    Assessment: Patient has chronic indwelling Braga catheter in place that was inserted during her previous hospital stay in an effort to allow ulcers to heal more rapidly.  Braga has been exchanged in the emergency department but there is concern for possible UTI based on UA results from postexchange Braga.  Urine culture showing Entrobacter sensitive to Rocephin and Bactrim    Plan:   -Transition to Bactrim for ongoing treatment      GUSTAVO (acute kidney injury) (H)    Assessment: Creatinine of 1.24 at presentation with previous baseline of 0.7-0.8, thought secondary to dehydration and sepsis.  Has slightly improved to 1.07 today with ongoing fluids but of note patient appears euvolemic and creatinine has been stable in the 1.0-1.1 range for the past 3 days.     Plan:   -Saline lock IV fluids  -Recheck creatinine tomorrow to monitor closely.  Patient's new baseline may be more 1.0-1.1       Dehydration    Assessment: Suspected at time of admission, likely contributing to acute kidney injury as above, no resolved    Plan:   -Continue to monitor for ongoing resolution      Decubitus ulcers - 5 present on buttocks/perineal region, numerous scabbed over and unstagable on shin and bilateral feet with some bloody blisters noted on feet    Assessment: Patient had known ulcerations upon arrival to [facility] earlier this month and wounds had apparently been closing while at the TCU environment but have worsened in the last week prior to admission for reasons unclear    Plan:   -Continue with Braga catheter to aid in healing of extensive ulcerations  -wound care nurse see patient today for assessment of most appropriate wound care plan  -Continue with antibiotics for treatment of acute cellulitis as above      Type 2 diabetes mellitus with complication, with long-term current use of insulin (H)    Hypoglycemia    Assessment: Patient is normally on metformin XR 2000 mg daily in addition to sliding scale insulin.  Blood sugars were apparently in the 600 range at the TCU prior to transfer to the ED and patient was given an unknown amount of insulin prior to transfer.  On arrival blood sugar was actually 65 but improved to 71 without intervention and has now increased into the low to mid 100 range.  Home metformin has been held the patient's oral intake has now started to improve and blood sugars are starting to increase into the upper 100s to low 200 range    Plan:   -We will start metformin at 1000 mg daily (half of patient's normal home dosing) and continue to monitor blood sugars with further upward titration if persistent hyperglycemia is still present.  -Provide medium resistance sliding scale for hyperglycemia      Essential hypertension with goal blood pressure less than 140/90    Assessment: Patient is normally on lisinopril 10 mg daily.  Was transiently held due to persistent hypotension but blood  pressures subsequently became consistently elevated and lisinopril has been restarted with blood pressures improving into normal range once more    Plan:   -Continue home lisinopril      Mixed hyperlipidemia    Assessment: On simvastatin at baseline    Plan:   -Continue simvastatin 20 mg daily as per home regimen       OBESITY    Assessment: Patient has BMI of 42    Plan:   -Continue supportive cares      Diabetic polyneuropathy associated with type 2 diabetes mellitus (H)    Assessment: Patient on previously has been on Lyrica for this condition however that was stopped at unknown timeframe and patient has been managing chronic pain with methadone    Plan:   -Continue chronic methadone dosing      Hypersomnia with sleep apnea    Assessment: Patient is supposed to be using CPAP device    Plan:   -We will supplement with oxygen as needed for hypoxemia with sleep      COPD (chronic obstructive pulmonary disease) (H)    Assessment: Previous documented history but details are unknown and patient does not appear to be on any long-acting or rescue medications for this.  No evidence of acute exacerbation    Plan:   -Continue to monitor respiratory status closely      Paroxysmal atrial fibrillation (H)    Assessment: Previous history however patient is not on any rate controlling medications, does take Coumadin for anticoagulation purposes with supratherapeutic INR is below    Plan:   -Continue monitor heart rate on routine vitals  -Appreciate pharmacy's assistance in Coumadin management during the stay      Primary hyperparathyroidism (H)    Assessment: Previously diagnosed, however there has been debate as to whether or not she is truly hyperparathyroid or has severe vitamin D deficiency.      Plan:   -No acute intervention is needed during this hospital stay      Chronic pain    Assessment: Patient is on methadone 5 mg at 8:00, 5 mg at noon, 10 mg at 3 PM, and 5 mg before bedtime    Plan:   -Continue home regimen and  "allow for as needed oral Dilaudid for breakthrough pain prior to wound care changes and therapy/significant ambulation given acute cause for pain as outlined above      Warfarin-induced coagulopathy (H)    Supratherapeutic INR    Assessment: INR was elevated with peak of 6.83 and has decreased to 2.11 today.  Patient did have small amount of hematuria present in her Braga catheter at time of arrival which has now resolved.     Plan:   -Pharmacy inpatient team to assist in Coumadin management     Diet: Snacks/Supplements Adult: Glucerna; Between Meals  Moderate Consistent Carb (60 g CHO per Meal) Diet    DVT Prophylaxis: Warfarin  Braga Catheter: PRESENT, indication: Wound Healing  Lines: PRESENT      PICC 05/26/23 Double Lumen Right Basilic-Site Assessment: WDL      Cardiac Monitoring: None  Code Status: Full Code      Clinically Significant Risk Factors           # Hypercalcemia: corrected calcium is >10.1, will monitor as appropriate    # Hypoalbuminemia: Lowest albumin = 2.3 g/dL at 5/27/2023  5:58 AM, will monitor as appropriate   # Thrombocytopenia: Lowest platelets = 126 in last 2 days, will monitor for bleeding   # Hypertension: Noted on problem list       # DMII: A1C = 8.2 % (Ref range: <5.7 %) within past 6 months, PRESENT ON ADMISSION  # Severe Obesity: Estimated body mass index is 41.72 kg/m  as calculated from the following:    Height as of this encounter: 1.676 m (5' 6\").    Weight as of this encounter: 117.3 kg (258 lb 8 oz)., PRESENT ON ADMISSION          Disposition Plan      Expected Discharge Date: 05/31/2023      Destination: inpatient rehabilitation facility  Discharge Comments: Patient refuses to return to [facility], placement needed.  [place] 5/31/2023 is anticipated       Velma Coles MD  Hospitalist Service  [facility]  Securely message with [employer] (more info)  Text page via [facility] Paging/Directory "   ______________________________________________________________________    Interval History   Patient remains vitally stable, afebrile with redness continuing to decrease and patient overall very pleased with the improvement in her discomfort.  She denies any new symptoms but is very anxious about possibly leaving the hospital tomorrow given how much she worsened the last time she left.  She has no other medical concerns.  No new nursing concerns.    Physical Exam   Vital Signs: Temp: 97.9  F (36.6  C) Temp src: Oral BP: (!) 148/76 Pulse: 103   Resp: 18 SpO2: 97 % O2 Device: None (Room air)    Weight: 258 lbs 8 oz    Constitutional: awake, alert, cooperative, no apparent distress but does appear slightly nervous, especially when talking about TCU,, and appears stated age  Respiratory: No increased work of breathing, good air exchange, clear to auscultation bilaterally, no crackles or wheezing  Cardiovascular: Normal apical impulse, regular rate and rhythm, normal S1 and S2, no S3 or S4, and no murmur noted  GI: Bowel sounds are present, abdomen is soft and nontender  Skin: Patient has ongoing improvement of the cellulitis with significant reduction in warmth and the redness is now a more dull red and scar within the lines of demarcation  Musculoskeletal: Ongoing lymphedema in bilateral lower extremities but significant swelling that was associated with cellulitis is essentially resolved in bilateral lower legs  Neurologic: Awake, alert, oriented to name, place and situation    Medical Decision Making       45 MINUTES SPENT BY ME on the date of service doing chart review, history, exam, documentation & further activities per the note.      Data     I have personally reviewed the following data over the past 24 hrs:    7.1  \   10.5 (L)   / 126 (L)     140 104 22.1 /  177 (H)   4.2 28 1.07 (H) \       Procal: N/A CRP: 20.12 (H) Lactic Acid: N/A       INR:  2.11 (H) PTT:  N/A   D-dimer:  N/A Fibrinogen:  N/A

## 2023-05-30 NOTE — CONSULTS
Care Management Initial Consult    General Information  Assessment completed with: Patient,    Type of CM/SW Visit: Offer D/C Planning    Primary Care Provider verified and updated as needed: Yes   Readmission within the last 30 days: YES     05/02/2023 - - 05/08/2023  [facility]    [address] [place] [address]    [phone]            Reason for Consult: discharge planning  Advance Care Planning:            Communication Assessment  Patient's communication style: spoken language (English or Bilingual)    Hearing Difficulty or Deaf: no   Wear Glasses or Blind: no    Cognitive  Cognitive/Neuro/Behavioral: WDL                      Living Environment:   People in home: alone     Current living Arrangements: [facility] [place] [address] [facility]    Able to return to prior arrangements: yes  Living Arrangement Comments: Lives in a senior independent apartment.   Was sent to the hospital from [facility] TCU    Family/Social Support:  Care provided by: child(angy) --was in a TCU at [facility] for rehab Provides care for: no one  Marital Status:   Children, Facility resident(s)/Staff          Description of Support System: Supportive, Involved    Support Assessment: Adequate family and caregiver support, Adequate social supports    [facility] # [phone]    Current Resources:   Patient receiving home care services: No  Community Resources: County Programs, County Worker, DME, Transitional Care  Equipment currently used at home: walker, sarabjit  Supplies currently used at home: Wound Care Supplies    Employment/Financial:  Employment Status: disabled, retired        Financial Concerns: No concerns identified      Finance Comments: Approved for MA-needs a MNChoices Assessment    Does the patient's insurance plan have a 3 day qualifying hospital stay waiver?  No    Lifestyle & Psychosocial Needs:  Social Determinants of Health     Tobacco Use: Low Risk  (5/26/2023)    Patient  History      Smoking Tobacco Use: Never      Smokeless Tobacco Use: Never      Passive Exposure: Not on file   Alcohol Use: Not on file   Financial Resource Strain: Not on file   Food Insecurity: Not on file   Transportation Needs: Not on file   Physical Activity: Not on file   Stress: Not on file   Social Connections: Not on file   Intimate Partner Violence: Not on file   Depression: Not on file   Housing Stability: Not on file       Functional Status:  Prior to admission patient needed assistance:   Dependent ADLs:: Bathing, Dressing, Incontinence, Positioning, Transfers, Wheelchair-with assist, Toileting  Dependent IADLs:: Cleaning, Cooking, Laundry, Shopping, Meal Preparation, Medication Management, Transportation, Incontinence  Assesssment of Functional Status: Needs placement in a SNF/TCF for rehabilitation    Mental Health Status:  Mental Health Status: No Current Concerns       Chemical Dependency Status:  Chemical Dependency Status: No Current Concerns             Values/Beliefs:  Spiritual, Cultural Beliefs, Sikh Practices, Values that affect care: no  Description of Beliefs that Will Affect Care: Baptist            Additional Information:  Referral received to assist with discharge planning and services. Admitted from [facility]-however Pt voices her desire to be placed in an alternative TCU location. Does NOT want to return back to [facility] on discharge.     CM met with the Pt to introduce self/role, complete assessment and discuss discharge options and facilities.     Pt voices her reasons for alternative placement, is aware further TCU is needed to regain her strength--but highly prefers to be placed closer to home.     CM offered facilities with her demographic area. Pt lives at [facility]. Was recently approved from [place]. Still needs a MNChoices Assessment and arrangement of increased in-home services before she can be discharged safely back to her  "apartment.     Extended referrals sent to locations of Pt's choice.     CM received call from Marissa-Admission's Director from [facility].   Accepted Pt into their TCU-Wednesday.     Pt voiced feeling very pleased with acceptance.   Stated she had been to [facility] in the past (July 2022) and received \"wonderful care!\"    Pt requested CM to assist with wheelchair transportation arrangements. Aware Pt is a distance from [place] and does not have family/friends that can support her mobility needs for private transport.     CM provided Marissa with a copy of the PAS from [place]    PLAN: Anticipating discharge to [facility] on Wednesday   Main Phone:[phone] Admissions Phone:[phone] Fax: [phone]    RN # for report # [phone]    CM will need to research Pt's MA coverage for transport and call for available agencies      LIZY Oswald  [facility] [phone]   [facility]  [phone]        "

## 2023-05-31 ENCOUNTER — TELEPHONE (OUTPATIENT)
Dept: WOUND CARE | Facility: CLINIC | Age: 78
End: 2023-05-31
Payer: COMMERCIAL

## 2023-05-31 ENCOUNTER — HOSPITAL ENCOUNTER (OUTPATIENT)
Dept: WOUND CARE | Facility: CLINIC | Age: 78
Discharge: HOME | DRG: 872 | End: 2023-05-31
Attending: FAMILY MEDICINE
Payer: COMMERCIAL

## 2023-05-31 VITALS
HEIGHT: 66 IN | BODY MASS INDEX: 41.54 KG/M2 | DIASTOLIC BLOOD PRESSURE: 69 MMHG | SYSTOLIC BLOOD PRESSURE: 143 MMHG | TEMPERATURE: 97.3 F | HEART RATE: 70 BPM | RESPIRATION RATE: 20 BRPM | OXYGEN SATURATION: 100 % | WEIGHT: 258.5 LBS

## 2023-05-31 LAB
ANION GAP SERPL CALCULATED.3IONS-SCNC: 7 MMOL/L (ref 7–15)
BUN SERPL-MCNC: 21.1 MG/DL (ref 8–23)
CALCIUM SERPL-MCNC: 10 MG/DL (ref 8.8–10.2)
CHLORIDE SERPL-SCNC: 102 MMOL/L (ref 98–107)
CREAT SERPL-MCNC: 1.16 MG/DL (ref 0.51–0.95)
CRP SERPL-MCNC: 16.34 MG/L
DEPRECATED HCO3 PLAS-SCNC: 29 MMOL/L (ref 22–29)
ERYTHROCYTE [DISTWIDTH] IN BLOOD BY AUTOMATED COUNT: 13.4 % (ref 10–15)
GFR SERPL CREATININE-BSD FRML MDRD: 48 ML/MIN/1.73M2
GLUCOSE BLDC GLUCOMTR-MCNC: 140 MG/DL (ref 70–99)
GLUCOSE BLDC GLUCOMTR-MCNC: 151 MG/DL (ref 70–99)
GLUCOSE BLDC GLUCOMTR-MCNC: 159 MG/DL (ref 70–99)
GLUCOSE SERPL-MCNC: 167 MG/DL (ref 70–99)
HCT VFR BLD AUTO: 33.2 % (ref 35–47)
HGB BLD-MCNC: 10.6 G/DL (ref 11.7–15.7)
INR PPP: 1.79 (ref 0.85–1.15)
MAGNESIUM SERPL-MCNC: 1.7 MG/DL (ref 1.7–2.3)
MCH RBC QN AUTO: 29.9 PG (ref 26.5–33)
MCHC RBC AUTO-ENTMCNC: 31.9 G/DL (ref 31.5–36.5)
MCV RBC AUTO: 94 FL (ref 78–100)
PLATELET # BLD AUTO: 133 10E3/UL (ref 150–450)
POTASSIUM SERPL-SCNC: 4.5 MMOL/L (ref 3.4–5.3)
RBC # BLD AUTO: 3.54 10E6/UL (ref 3.8–5.2)
SODIUM SERPL-SCNC: 138 MMOL/L (ref 136–145)
WBC # BLD AUTO: 7.4 10E3/UL (ref 4–11)

## 2023-05-31 PROCEDURE — 250N000013 HC RX MED GY IP 250 OP 250 PS 637: Performed by: FAMILY MEDICINE

## 2023-05-31 PROCEDURE — 99239 HOSP IP/OBS DSCHRG MGMT >30: CPT | Performed by: FAMILY MEDICINE

## 2023-05-31 PROCEDURE — 85027 COMPLETE CBC AUTOMATED: CPT | Performed by: FAMILY MEDICINE

## 2023-05-31 PROCEDURE — 80048 BASIC METABOLIC PNL TOTAL CA: CPT | Performed by: FAMILY MEDICINE

## 2023-05-31 PROCEDURE — 86140 C-REACTIVE PROTEIN: CPT | Performed by: FAMILY MEDICINE

## 2023-05-31 PROCEDURE — 250N000011 HC RX IP 250 OP 636: Performed by: FAMILY MEDICINE

## 2023-05-31 PROCEDURE — G0463 HOSPITAL OUTPT CLINIC VISIT: HCPCS

## 2023-05-31 PROCEDURE — 83735 ASSAY OF MAGNESIUM: CPT | Performed by: FAMILY MEDICINE

## 2023-05-31 PROCEDURE — 85610 PROTHROMBIN TIME: CPT | Performed by: INTERNAL MEDICINE

## 2023-05-31 PROCEDURE — 87635 SARS-COV-2 COVID-19 AMP PRB: CPT | Mod: ORL | Performed by: FAMILY MEDICINE

## 2023-05-31 RX ORDER — FLUCONAZOLE 150 MG/1
150 TABLET ORAL
Qty: 3 TABLET | Refills: 0 | DISCHARGE
Start: 2023-06-03 | End: 2023-06-13

## 2023-05-31 RX ORDER — WARFARIN SODIUM 2.5 MG/1
TABLET ORAL
DISCHARGE
Start: 2023-05-31 | End: 2023-06-13 | Stop reason: DRUGHIGH

## 2023-05-31 RX ORDER — FAMOTIDINE 20 MG/1
20 TABLET, FILM COATED ORAL 2 TIMES DAILY
DISCHARGE
Start: 2023-05-31 | End: 2023-06-13 | Stop reason: DRUGHIGH

## 2023-05-31 RX ORDER — ACETAMINOPHEN 325 MG/1
650 TABLET ORAL EVERY 6 HOURS PRN
DISCHARGE
Start: 2023-05-31 | End: 2024-01-08

## 2023-05-31 RX ORDER — SULFAMETHOXAZOLE/TRIMETHOPRIM 800-160 MG
1 TABLET ORAL 2 TIMES DAILY
Qty: 18 TABLET | Refills: 0 | DISCHARGE
Start: 2023-05-31 | End: 2023-06-09

## 2023-05-31 RX ORDER — WARFARIN SODIUM 2.5 MG/1
2.5 TABLET ORAL
Status: DISCONTINUED | OUTPATIENT
Start: 2023-05-31 | End: 2023-05-31 | Stop reason: HOSPADM

## 2023-05-31 RX ORDER — MAGNESIUM SULFATE HEPTAHYDRATE 40 MG/ML
2 INJECTION, SOLUTION INTRAVENOUS ONCE
Status: COMPLETED | OUTPATIENT
Start: 2023-05-31 | End: 2023-05-31

## 2023-05-31 RX ORDER — HYDROMORPHONE HYDROCHLORIDE 4 MG/1
4 TABLET ORAL EVERY 6 HOURS PRN
Qty: 10 TABLET | Refills: 0 | Status: SHIPPED | OUTPATIENT
Start: 2023-05-31 | End: 2023-06-22

## 2023-05-31 RX ORDER — METHADONE HYDROCHLORIDE 10 MG/1
10 TABLET ORAL DAILY
Qty: 30 TABLET | Refills: 0 | Status: ON HOLD | OUTPATIENT
Start: 2023-05-31 | End: 2023-11-15

## 2023-05-31 RX ORDER — ONDANSETRON 4 MG/1
4 TABLET, ORALLY DISINTEGRATING ORAL EVERY 6 HOURS PRN
Status: ON HOLD | DISCHARGE
Start: 2023-05-31 | End: 2023-09-23

## 2023-05-31 RX ORDER — FLUCONAZOLE 150 MG/1
150 TABLET ORAL
Status: DISCONTINUED | OUTPATIENT
Start: 2023-05-31 | End: 2023-05-31 | Stop reason: HOSPADM

## 2023-05-31 RX ORDER — METHADONE HYDROCHLORIDE 5 MG/1
5 TABLET ORAL 3 TIMES DAILY
Qty: 90 TABLET | Refills: 0 | Status: SHIPPED | OUTPATIENT
Start: 2023-05-31 | End: 2023-06-22

## 2023-05-31 RX ADMIN — PENTOXIFYLLINE 400 MG: 400 TABLET, EXTENDED RELEASE ORAL at 08:10

## 2023-05-31 RX ADMIN — PENTOXIFYLLINE 400 MG: 400 TABLET, EXTENDED RELEASE ORAL at 12:09

## 2023-05-31 RX ADMIN — SULFAMETHOXAZOLE AND TRIMETHOPRIM 1 TABLET: 800; 160 TABLET ORAL at 08:09

## 2023-05-31 RX ADMIN — FAMOTIDINE 20 MG: 20 TABLET ORAL at 08:11

## 2023-05-31 RX ADMIN — LISINOPRIL 10 MG: 10 TABLET ORAL at 08:09

## 2023-05-31 RX ADMIN — HYDROMORPHONE HYDROCHLORIDE 4 MG: 2 TABLET ORAL at 10:26

## 2023-05-31 RX ADMIN — METHADONE HYDROCHLORIDE 5 MG: 5 TABLET ORAL at 08:09

## 2023-05-31 RX ADMIN — MAGNESIUM SULFATE HEPTAHYDRATE 2 G: 40 INJECTION, SOLUTION INTRAVENOUS at 08:43

## 2023-05-31 RX ADMIN — FLUCONAZOLE 150 MG: 150 TABLET ORAL at 12:47

## 2023-05-31 RX ADMIN — METHADONE HYDROCHLORIDE 5 MG: 5 TABLET ORAL at 12:08

## 2023-05-31 RX ADMIN — MICONAZOLE NITRATE: 20 POWDER TOPICAL at 08:10

## 2023-05-31 ASSESSMENT — ACTIVITIES OF DAILY LIVING (ADL)
ADLS_ACUITY_SCORE: 39

## 2023-05-31 NOTE — PLAN OF CARE
Goal Outcome Evaluation:      Plan of Care Reviewed With: patient    Overall Patient Progress: no changeOverall Patient Progress: no change    Outcome Evaluation: Pt is A&Ox4, VSS maintaining 02 saturations on RA. Dressings in place and C/D/I. On scheduled methadone for pain management. Pt repos self while in bed. Braga in place for wound healing, good urine output. Continues oral bactrim.

## 2023-05-31 NOTE — DISCHARGE INSTRUCTIONS
It was nice to meet you Pat. I re assessed your wounds today, I recommend you continue plan of care that Riaz SIERRA established yesterday for you. I suggest that you continued to be followed by wound nurse at the new facility your going to. If this is not possible I suggest you be followed by a wound nurse in a outpatient setting. Until follow up with wound nurse, recommend treatment below.  Please complete cares daily and as needed for dislodge or soiled dressing.    Wound cares as follows:    - Cleanse all wounds with soap and water, saline or a no rinse dermal cleanser and dry gently with gauze.    - Buttocks and thigh pressure injuries and intertriginous dermatitis site;  Apply Triad paste to open wounds nickel thick, cover with Mepilex gentle adhesive foam dressings (or similar silicone based gentle adhesive foam dressings), change dressings daily and prn for soiling from internal drainage or external exposure to urine or other body fluids.    - Right and left lower leg venous stasis ulcer:  Keep clean and dry, resume use of compression garments when available.    - Left plantar foot 5th MTH base blood blister:  Keep clean and dry.  Keep elevated to avoid all pressure and friction.  If site opens recommend to clean and keep covered with dry gauze till provider or wound specialist can evaluate for new plan of care.  Goal would be to allow to re absorb and not rupture the blister.      - Left foot medial 1st MTH diabetic ulcer:  Keep clean and dry.  Allow eschar and hyperkeratotic tissue to come off on its own over time.    Thank you, hoping the best for you as you continue to heal.   Nicol Ramesh RN [facility]  [phone]

## 2023-05-31 NOTE — PLAN OF CARE
Physical Therapy Discharge Summary    Reason for therapy discharge:    Discharged to transitional care facility.    Progress towards therapy goal(s). See goals on Care Plan in [facility] electronic health record for goal details.  Goals partially met.  Barriers to achieving goals:   limited tolerance for therapy and discharge from facility.    Therapy recommendation(s):    Continued therapy is recommended.  Rationale/Recommendations:  to continue addressing mobility progressing while facilitating proper wound healing.      Thank you for your referral.  Arleen Mckeon, PT, DPT, ATC, [facility]  O: [phone]  E: [email or web address]

## 2023-05-31 NOTE — PROGRESS NOTES
[facility] done as inpatient:    Reason For Visit: Linda Loredo, 78 year old female, seen for reevaluatation and treatment of buttock/sreekanth area wound, lower legs and foot wounds.  Patient was admitted on 5/26/23 for sepsis, cellulitis, UTI, GUSTAVO, wounds.  Pt's nurse Lyndsey GARCIA is also present with patient in the room.  Patient is A&Ox4, pleasant upon interaction.     Start of Care in [facility]: 5/30/2023   Referring Provider: Dr EMILY Coles MD  Primary Care Provider: Ish Brown   Wound Location: Right buttock/posterior thigh.  and left buttock, thigh.  Lower legs.  Left foot plantar lateral and medial distal.        Subjective:  Pt with interview during visit today added to the Wound History below.  She reports her concerns and pain are the buttock and thigh wounds.  Feels her lower leg wounds related to swelling are improving and not of significant concern now.  Left plantar foot wound she is unclear what is best options ongoing.       Wound History:   Note on wounds origin and status initial woc nurse visit inpatient here at [facility].  Chart review of most recent hospitalization done with some additional questions not fully clear.  [facility] nurse services here will continue to review past documentation to better define wound origins and history.  Pt has a past medical history of wound which includes a significant left foot plantar heel diabetic ulcer that was treated with wound vac over long course and is currently closed.  Past history also of additional wounds related to diabetes to her feet and toes, venous ulcers of her lower legs.  For lower leg edema control the pt reports in the past she was able to wear compression garments bilaterally and wounds healed.  She had open wounds to her lower legs when she was discharge to TCU on 5/8/23, treated with topical dressings and ace wraps for compression, these have dried to  current status.   Buttocks wounds:  The pt presented to the ED at the [facility] and was admitted 5/2/23 - 5/8/23.  Documentation from that admission including wound care assessment states the following active wounds: Right buttock to thigh large unstageable pressure ulcer mostly slough covered with signs of cellulitis infection.  Bilateral lower leg venous ulcers.  Left medial foot, left 2nd toe and left 5th toe diabetic ulcers, all debrided on 5/3/23 to clean wound beds.  Pt was discharged to TCU on 5/8/23 with oral antibiotics for infection.  The pt she states at our initial inpatient visit 5/30/23, that on her admission to the TCU at [facility], she believes she had two pressure sores on her buttocks and now believes there are five, she was discharged .  Pt presented to the ED here at [facility] on 5/26/23, she was admitted with sepsis, cellulitis of the buttocks and legs, wound infections of the buttocks and additional wounds as listed below.     Past Medical History:  Patient Active Problem List   Diagnosis     Type 2 diabetes mellitus with complication, with long-term current use of insulin (H)     DEPRESSION     Essential hypertension with goal blood pressure less than 140/90     combination of stress and urge URINARY INCONTINENCE     Herpes zoster     Insomnia     Mixed hyperlipidemia      OBESITY     Diabetic polyneuropathy associated with type 2 diabetes mellitus (H)     Routine general medical examination at a health care facility     Generalized osteoarthrosis, unspecified site     Hypersomnia with sleep apnea     Fibromyalgia     Anxiety     Chronic kidney disease     Elevated liver enzymes     Fracture of fibula, distal, left, closed     Hypotension     Abscess of left foot     Allergic rhinitis     Anticoagulation monitoring, INR range 2-3     Bilateral lower extremity edema     COPD (chronic obstructive pulmonary disease) (H)     Diabetic ulcer of left heel associated with  type 2 diabetes mellitus, with muscle involvement without evidence of necrosis (H)     Major depressive disorder, recurrent episode, moderate (H)     Microalbuminuria due to type 2 diabetes mellitus (H)     Paroxysmal atrial fibrillation (H)     Osteopenia     Obesity, Class III, BMI 40-49.9 (morbid obesity) (H)     Mixed hyperlipidemia due to type 2 diabetes mellitus (H)     Vitamin D deficiency     Venous stasis ulcers of both lower extremities (H)     Umbilical hernia without obstruction and without gangrene     Systolic murmur     Primary hyperparathyroidism (H)     Chronic pain     Cellulitis of buttock     Sepsis without acute organ dysfunction, due to unspecified organism (H)     UTI (urinary tract infection) - possible     GUSTAVO (acute kidney injury) (H)     Dehydration     Cellulitis - bilateral lower extremities     Decubitus ulcers - 5 present on buttocks/perineal region, numerous scabbed over and unstagable on shin and bilateral feet with some bloody blisters noted on feet     Warfarin-induced coagulopathy (H)     Hypoglycemia     Supratherapeutic INR                Tobacco Use:     Tobacco Use      Smoking status: Never      Smokeless tobacco: Never    Vaping Use      Vaping status: None       Diabetic: type 2  HgbA1C:   Hemoglobin A1C   Date Value Ref Range Status   05/27/2023 8.2 (H) <5.7 % Final     Comment:     Normal <5.7%   Prediabetes 5.7-6.4%    Diabetes 6.5% or higher     Note: Adopted from ADA consensus guidelines.   12/19/2007 10.0 (H) 4.3 - 6.0 % Final   Checks Blood Glucose?:  See Hospital flow sheet for inpatient results.    Personal/social history:  Pt was admitted to [facility] from TCU at [facility]. Prior to her hospital admission to [facility] on 5/2/23 the pt was living alone in private home, home care had been ordered but unclear if services had began.      Objective:   Current treatment plan: Buttocks and posterior thigh ulcers: Triad paste covered with Mepilex gentle  adhesive foam dressings.  Last changed: This am    Wound #1 Right buttock/posterior thigh.  Stage/tissue depth: unstageable pressure injury, full thickness (see Assessment for more details).  Site consists of a large area of nonblanchable erythema with increased warmth to touch, with three distinct open wounds, from the lower right buttock to the proximal posterior thigh.  Total area 23 cm L x 6 cm W x 0.2 cm D  Open aspects:  - Right Ischial tuberosity 1.5 cm L x 1.6 cm W x 0.1 cm D, wound bed is 100 % red nongranular tissue, small amounts serous drainage.  - Right inferior buttock/posterior thigh fold 4 cm L x 4 cm W x 0.1 cm D, wound bed is approximately 70 % red nongranular tissue and 30 % semi moist nonviable dermal tissue, moderate  amounts serous drainage.  - Right posterior thigh 5 cm L x 6 cm W x 0.2 cm D, wound bed is approximately 50 % granular tissue, 40 % red nongranular tissue and 10 % grey eschar, large amounts serosanguinous and purulent yellow drainage.  Tunneling: none  Undermining: none  Wound bed type/amount: As listed above for each site; Is fluctuant  Wound Edges: open where there is depth  Periwound: blanchable erythema/purple discoloration (blanchable as well)   Drainage: as listed above for each site  Odor: none ntoed  Pain: Patient did not rate pain, but did report pre medicating was helpful. Pain was less and more tolerable.    Photos from today's visit 5/31/23    Photo's from 5/30/23, initial inpatient [facility] nurse assessment.    Wound #2 Left Ischial tuberosity.  Stage/tissue depth: stage 2 pressure injury, partial thickness  1.6 cm L x 1.3cm W x 0 cm D  Tunneling: none  Undermining: none  Wound bed type/amount: 100 % red nongranular tissue; Not fluctuant  Wound Edges: NA no depth  Periwound: blanchable erythema with no increased warmth to touch, mirrored denuded tissue to right buttock (blanchable)   Drainage: small sanguinous   Odor: none noted  Pain: mild amount of pain   Photo from  today's visit 5/31/23    Wound #3 Left inferior buttock/posterior thigh fold.  Stage/tissue depth: partial thickness, appears more related to intertriginous dermatitis and urine/vaginal drainage related than pressure.  0 cm L x 0 cm W x 0 cm D  Tunneling: none  Undermining: none  Wound bed type/amount: 100 % intact moist skin; Not fluctuant  Wound Edges: NA no depth  Periwound: moist intact skin  Drainage: none related to wound, notable milk, thick discharge from catheter/vaginal area  Odor: none from the wound, sreekanth area odor noted from sreekanth catheter drainage.  Pain: 10/10 with cares   Photo from today's visit 5/31/23    Photo from 5/30/23, initial inpatient wo nurse assessment.    Wound #4 Right anterior lower leg, venous stasis ulcer.  Stage/tissue depth: unable to confirm but appears full thickness.  Total area of venous dermatitis 11 cm L x 10 cm W x 0 cm D  Large scab of dried drainage and eschar mix 3 cm L x 1 cm W x 0 cm D, no drainage present currently.  Tunneling: none noted  Undermining: none noted  Wound bed type/amount: as listed above; NA fluctuant  Wound Edges: Closed with dry drainage at this time  Periwound: edema, erythema with no increased warmth to touch.  Drainage: none  Odor: no  Pain: no assessed today.   Photo from today's visit 5/31/23  Photo from  5/30/23, initial inpatient wo nurse assessment.    Wound #5 Left foot plantar aspect at base of 5th MTH.  Stage/tissue depth: full thickness, origin debateable but likely mix of diabetic and pressure injury.  2.5 cm L x 2.5 cm W x 0 cm D, no changes 5/31/23  Tunneling: none visible  Undermining: none visible  Wound bed type/amount: 100 % intact dark sanguinous filled blister raised approximately 0.5 cm; Not fluctuant  Wound Edges: NA  Periwound: edema  Drainage: none  Odor: no  Pain: denies pain at site   Photo from today's visit 5/31/23  Photo from 5/30/23, initial inpatient wo nurse assessment.    Wound #6 Left foot, medial aspect 1st  MTH.  Stage/tissue depth: full thickness diabetic ulcer  1.5 cm L x 2 cm W x 0 cm D, no changes 5/31/23  Tunneling: none  Undermining: none  Wound bed type/amount: approximately 40 % dry black eschar, 60 % hyperkeratotic tissue; Not fluctuant  Wound Edges: closed with eschar and hyperkeratotic tissue  Periwound: edema  Drainage: none  Odor: no  Pain: denies pain at site, tender with cares   photo from today's visit 5/31/23  Photo from5/30/23, initial inpatient woc nurse assessment.  Wound # 7 Left anterior lower leg, venous stasis ulcer.  Stage/tissue depth: unable to confirm but appears partial thickness.  Scab of dried drainage 2.5 cm L x 0.5 cm W x 0 cm D, no drainage present currently.  Tunneling: none noted  Undermining: none noted  Wound bed type/amount: as listed above; NA fluctuant  Wound Edges: Closed with dry drainage at this time  Periwound: edema, erythema with no increased warmth to touch.  Drainage: none  Odor: no  Pain: no assessed today.   photo from today's visit 5/31/23, initial assessment    Dorsalis Pedal Pulse: weak but palpable: NA doppler: NA phasic  Hair growth: diminished knees distally  Capillary Refill: 3 seconds  Feet/toes color: pale pink  Nails: wnl  R Leg: Edema plus 3 pitting. Ankle circumference NA cm. Calf circumference NA cm.  L Leg: Edema plus 3 pitting. Ankle circumference NA cm. Calf circumference NA cm.    Mobility: limited  Current offloading/footwear: not assessed this visit  Sensation: peripheral neuropathy  Other callousing/areas of concern:  - Left heel, closed diabetic ulcer, was full thickness. 100 % covered with dry callous and hyperkeratotic tissue.  No drainage.   Photo from today's visit 5/31/23  Photo from today's visit 5/30/23, initial woc nurse inpatient assessment.    Diet: diabetic     Discussed: etiology of wound (Pressure, venous edema, diabetes), pathophysiology and patient specific goals for wound healing.   Education: Role of woc nurse in hospital setting,  rational for recommend plan of care    Goals of Wound Healing: For buttocks pressure injuries/  - Transition from unstageable to fully granulating stage of healing  - Maintain moist environment, with Triad paste  - Control exudate, with Triad paste and Mepilex gentle adhesive dressings.  - Autolytic debridement of  necrotic/sloughy tissue with Triad paste  - Protect wound from outside environment, with Triad paste and Mepilex gentle adhesive dressings.  - Antimicrobial Pt is on antibiotics  - Pack open space, with Triad paste where appropriate.  - Promote healing by secondary intention for complete closure of wound, for full thickness ulcers  - Offloading/pressure reduction, pressure reduction mattress recommendation, pt has pressure reduction cushion for sitting surface at facility    Assessment:    Right buttock/posterior thigh wounds are covered with Mepilex dressings, removed very slow due to high levels of pain. Posterior, distal thigh wound continues to be the most severe related to pain.  Proximal and middle wounds present with 100% viable tissue and small to moderate amount of drainage.  Most distal wound of three is mostly viable tissue, but does haver small area that has loose, stringy slough tissue.  Unable to remove slough tissue with cares today.  Janeth skin remains to have erythema, warmth was not felt today.      Left Ischial tuberosity -viable tissue, with small amount of drainage.  Noted to have mirrored denuded tissue to right IT. Area to Right IT is blanchable otherwise would consider another pressure injury.  Janeth skin has faint erythema and dry, flaking old epidermis layer.   Left Inferior buttock/posterior thigh- WOC was unable to find wound, suggest area being re epithelized, but it is a possibility that WOC nurse over looked area.      Right anterior Lower leg -able to remove some of dry drainage/scabbed area with cleaning.  Continues with dry drainage/scab to remainder or wound bed. Periwound  has blanchable erythema, no increased warmth noted.  Periwound is also dry, thin hyperkertotic tissue scattered throughout gaiter area.    Left foot, plantar- Continues to be intact sanguineous filled blisters.  No changes since yesterday.     Left Medial foot- Covered with eschar and hyperkeratotic tissue, none draining.  Janeth skin intact and free of erythema.     Left Anterior lower leg- nearly all of gaiter area on lower leg has blanchable erythema, no increase warmth and edematous.  Patient reports this to an ongoing issue,  not new or concerning .  At the proximal aspect of erythema is venous ulcer.  Ulcer is covered with dry drainage, not able to define partial thickness or full, but would suggest partial thickness.      Barriers to wound healing:   Poor nutrition: inadequate supply of protein, carbohydrates, fatty acids, and trace elements essential for all phases of wound healing.  Assessed appetite, states no hungry but making herself eat.  Edu. On trying to consume protein source prior to eating rest of meal.  Also drinking nutritional supplement.   Reduced Blood Supply: inadequate perfusion to heal wound. Pt has ALMA US results in Epic from 5/29/22, results were wnl for both LE, impression adequate arterial flow to promote wound healing.  Medication: NA  Chemotherapy: suppresses the immune system and inflammatory response, NA  Radiotherapy: increases production of free radical which damage cells, NA  Psychological stress: related to painful and decline in wounds   Obesity: decreases tissue perfusion  Infection: prolongs inflammatory phase, uses vital nutrients, impairs epithelialization and releases toxins, cellulitis present, pt is on antibiotics  Underlying Disease: diabetes mellitis, LE edema, lack of ability to reposition self significantly.  Maceration: reduces wound tensile strength and inhibits epithelial migration, at high risk for, addressed with Triad paste today  Patient compliance, TBD appears  motivated to heal  Unrelieved pressure, present, will request appropriate pressure reduction mattress  Immobility, limited  Substance abuse: NA    Plan:  No changes to wound care, recommend patient be followed by either WO nurse at discharging facility or by outpatient wound clinic.     Wound cares as follows:    - Cleanse all wounds with soap and water, saline or a no rinse dermal cleanser and dry gently with gauze.    - Buttocks and thigh pressure injuries and intertriginous dermatitis site;  Apply Triad paste to open wounds nickel thick, cover with Mepilex gentle adhesive foam dressings (or similar silicone based gentle adhesive foam dressings), change dressings daily and prn for soiling from internal drainage or external exposure to urine or other body fluids.    - Right and left lower leg venous stasis ulcer:  Keep clean and dry, resume use of compression garments when available.    - Left plantar foot 5th MTH base blood blister:  Keep clean and dry.  Keep elevated to avoid all pressure and friction.  If site opens recommend to clean and keep covered with dry gauze till provider or wound specialist can evaluate for new plan of care.  Goal would be to allow to re absorb and not rupture the blister.      - Left foot medial 1st MTH diabetic ulcer:  Keep clean and dry.  Allow eschar and hyperkeratotic tissue to come off on its own over time.      Interventions to Barriers:  As listed above.    Topical care to wounds as listed in Plan above.    Offloading: As suggest above    Additional recommendations:  To be followed by WO at discharging facility or outpatient wound clinic.     Discussed plan of care with patient, her nurse Lyndsey GARCIA . Teaching done with patient, her nurse Solorio RN for dressing changes; Pt is unable to do self cares to wounds, will need assist of nursing for all cares.    Discharge instructions updated to include:  As listed above    Supplies: Multiple Mepilex dressings to plan for weeks worth of  dressing changes, continues to have Triad Paste and other supplies for cleaning.     WOC Return visit: NA, dsicharged from facility [facility]   Nursing to notify Provider and WOC Nurse if wound deteriorates.    Verbal, written, & demonstrative education provided.  Face to face time (excluding procedure): approximately 45 minutes.  Procedure: less than 1 minute application of Triad paste to one area of remaining eschar of the right buttock/posterior thigh wound. To promote autolytic debridement.  Care plan was not changed.      Nicol Ramesh RN OCN  [phone]

## 2023-05-31 NOTE — PROGRESS NOTES
S-(situation): Patient discharged to [facility] via wheelchair with transport service.    B-(background): Pt here with cellulitis and UTI.    A-(assessment):Pt is A&Ox4. VSS on RA. Pt is afebrile today. Pt is up to wheelchair with SBA and cane. Dressing and wound care done by [facility] nurse today. Pain has been managed well with PO methadone and use of PO dilaudid previous to wound care/dressing change. Pt able to turn self in bed and reposition. Pt refused wanting writer to assist in turning. Pt's appetiet is decreased eating about 50% of meals and glucerna. Jung catheter in place, jung cares done. Urine is ramona. Pt will continue on bactrim PO for cellultitis and UTI.  Nurse to nurse report given to Rickey at [facility].    R-(recommendations): Discharge instructions reviewed with Rickey saldivar [facility]. Listed belongings gathered and returned to patient.          Discharge Nursing Criteria:     Care Plan and Patient education resolved: Yes    New Medications- pt has been educated about purpose and side effects: Yes    Vaccines  Influenza status verified at discharge:  Not Applicable    MISC  Prescriptions if needed, hard copies sent with patient  Yes  Home medications returned to patient: NA  Medication Bin checked and emptied on discharge Yes  Patient reports post-discharge pain management plan is effective: Yes

## 2023-05-31 NOTE — PLAN OF CARE
Goal Outcome Evaluation:      Plan of Care Reviewed With: patient    Overall Patient Progress: improvingOverall Patient Progress: improving    Outcome Evaluation: Pt is A&Ox4. VSS on RA. Pt is afebrile today. Pt is up to wheelchair with SBA and cane. Dressing and wound care done by [facility] nurse today. Pain has been managed well with PO methadone and use of PO dilaudid previous to wound care/dressing change. Pt able to turn self in bed and reposition. Pt refused wanting writer to assist in turning. Pt's appetiet is decreased eating about 50% of meals and glucerna. Jung catheter in place, jung cares done. Urine is ramona. Pt will continue on bactrim PO for cellultitis and UTI.  Nurse to nurse report given to Rickey [place].

## 2023-05-31 NOTE — PHARMACY-ANTICOAGULATION SERVICE
Clinical Pharmacy - Warfarin Dosing Consult     Pharmacy has been consulted to manage this patient s warfarin therapy.  Indication: Atrial Fibrillation  Therapy Goal: INR 2-3  Warfarin Prior to Admission: Yes  Warfarin PTA Regimen: 2.5 mg on Mon, Wed, Fri and 3 mg all other days of the week  Significant drug interactions: Bactrim    INR   Date Value Ref Range Status   05/31/2023 1.79 (H) 0.85 - 1.15 Final   05/30/2023 2.11 (H) 0.85 - 1.15 Final       Recommend warfarin 2.5 mg today.  Pharmacy will monitor Linda Loredo daily and order warfarin doses to achieve specified goal.      Please contact pharmacy as soon as possible if the warfarin needs to be held for a procedure or if the warfarin goals change.

## 2023-05-31 NOTE — DISCHARGE SUMMARY
"[facility]  Hospitalist Discharge Summary      Date of Admission:  5/26/2023  Date of Discharge:  5/31/2023  Discharging Provider: Velma Coles MD  Discharge Service: Hospitalist Service    Discharge Diagnoses   Principal Problem:    Sepsis without acute organ dysfunction, due to unspecified organism (H)  Active Problems:    Cellulitis of buttock    UTI (urinary tract infection) - possible    GUSTAVO (acute kidney injury) (H)    Dehydration    Cellulitis - bilateral lower extremities    Decubitus ulcers - 5 present on buttocks/perineal region, numerous scabbed over and unstagable on shin and bilateral feet with some bloody blisters noted on feet    Hypoglycemia    Type 2 diabetes mellitus with complication, with long-term current use of insulin (H)    Essential hypertension with goal blood pressure less than 140/90    Mixed hyperlipidemia     OBESITY    Diabetic polyneuropathy associated with type 2 diabetes mellitus (H)    Hypersomnia with sleep apnea    COPD (chronic obstructive pulmonary disease) (H)    Major depressive disorder, recurrent episode, moderate (H)    Paroxysmal atrial fibrillation (H)    Primary hyperparathyroidism (H)    Chronic pain    Warfarin-induced coagulopathy (H)    Supratherapeutic INR    Clinically Significant Risk Factors     # DMII: A1C = 8.2 % (Ref range: <5.7 %) within past 6 months  # Severe Obesity: Estimated body mass index is 41.72 kg/m  as calculated from the following:    Height as of this encounter: 1.676 m (5' 6\").    Weight as of this encounter: 117.3 kg (258 lb 8 oz).       Follow-ups Needed After Discharge   Follow-up Appointments     Follow Up and recommended labs and tests      Follow up with Nursing home physician.    Labs should be drawn on 6/5/23 - BMP, CRP inflammation and INR. Further   labs per nursing home provider and INR nurse.   Physician should work with wound care team (if applicable) to determine   when jung catheter " can be safely removed           Discharge Disposition   [facility]  Condition at discharge: Stable    Hospital Course     Patient is a 78-year-old female with past medical history of type 2 diabetes, hypertension, hyperlipidemia, morbid obesity, COPD, paroxysmal atrial fibrillation on Coumadin, primary hyperparathyroidism, depression, [place] who was hospitalized earlier this month at [place] due to cellulitis from decubitus ulcers in the buttocks and perineal/upper thigh region who improved on IV Rocephin and was [facility] with ongoing oral cefazolin for 10-day antibiotic course completion.  She was also discharged with Braga catheter in place to allow for improved healing of the significant ulcerations due to her urinary incontinence.  Patient cellulitis and sores initially improved however in recent days there had been concern from the nursing staff at the TCU that they are becoming reinfected and patient was brought back to the emergency room where she was found to have sepsis secondary to recurrent cellulitis surrounding her buttocks wounds and wounds in bilateral lower extremities and she was admitted and placed on Zosyn and vancomycin.  Braga catheter was exchanged and urine was obtained after sample and look worrisome for infection as well.    Patient has progressively improved and she transitioned to the [facility] floor on 5/28/2023 with cellulitis improving even after patient was transition to Ancef and Vanco and then further transitioned to Rocephin and vancomycin as UTI has grown out Enterobacter.  Following urine sensitivity results patient has been transitioned to PO bactrim with ongoing improvement occurring.   Physical therapy is recommending return to TCU and patient requested a TCU closer to her home -has been has been found at [place] [facility] and patient is discharged there in stable condition.    Principal Problem:    Sepsis without acute organ dysfunction, due  to unspecified organism (H)    Cellulitis of buttock    Cellulitis - bilateral lower extremities    Assessment: Patient presented with with mild hypotension responsive to fluids, lactic acid of 4.4, worsening cellulitis surrounding chronic wounds.  Now on oral Bactrim with cellulitis continue to improve    Plan:   -Discharge with ongoing Bactrim for total course completion of 14 days of antibiotics  -Continue extensive wound care daily dressing changes at the TCU with outpatient wound care RN referral in place  -Braga catheter to remain in place to assist in wound healing until patient is strong enough to get to the bathroom independently.  We will work with rehab provider regarding timing of discontinuation of Braga    Active Problems:     Acute cystitis with hematuria     UTI due to Indwelling Catheter    Assessment: Patient has chronic indwelling Braga catheter in place that was inserted during her previous hospital stay in an effort to allow ulcers to heal more rapidly.  Braga has been exchanged in the emergency department but there is concern for possible UTI based on UA results from postexchange Braga.  Urine culture showing Entrobacter sensitive to Rocephin and Bactrim    Plan:   -Continue with treatment of Bactrim at time of discharge  -Braga will be exchanged every 2 weeks at the TCU to avoid recurrent colonization and infection  -Patient will work with facility provider to determine when the wounds have healed well enough for Braga catheter to be removed      Vaginal candidiasis    Assessment: Suspected near the end of the stay with patient having large amount of white vaginal discharge that had not subsequently been present and patient received several days of broad-spectrum antibiotics    Plan:    -Diflucan 150 mg x 1 given now and will continue every 72 hours until antibiotics are complete.  -Given patient's use of methadone there is a small for possible risk of QT prolongation with a combination of  medicines.  QT interval was assessed via EKG prior to discharge and is well within normal limits even while on methadone therefore the risk of this is felt even smaller.      GUSTAVO (acute kidney injury) (H) - suspected but ruled out    Assessment: Creatinine of 1.24 at presentation with previous baseline of 0.7-0.8, thought secondary to dehydration and sepsis however creatinine has stabilized in the 1.0-1.1 range for the past few days and this is now suspected to be her new baseline.    Plan:   -Continue to monitor intermittently on an outpatient basis to confirm if this is new baseline      Dehydration    Assessment: Suspected at time of admission, now resolved    Plan:  -No further treatment is needed      Decubitus ulcers - 5 present on buttocks/perineal region, numerous scabbed over and unstagable on shin and bilateral feet with some bloody blisters noted on feet    Assessment: Patient has numerous ulcers on her buttocks and perineal region as well as many scabs and blood blisters on her lower legs.  She has been seen by wound care RN several times during this stay    Plan:   -Continue with Braga catheter to aid in healing of extensive ulcerations  -Appreciate wound care recommendations for ongoing wound management following discharge  -Continue with antibiotics for treatment of acute cellulitis as above  -Outpatient order has been placed for wound care RN at [facility] in [place] if patient desires ongoing wound care management.      Type 2 diabetes mellitus with complication, with long-term current use of insulin (H)    Hypoglycemia    Assessment: Patient is normally on metformin XR 2000 mg daily in addition to sliding scale insulin.  Blood sugars were apparently in the 600 range at the TCU prior to transfer to the ED and patient was given an unknown amount of insulin prior to transfer.  On arrival blood sugar was actually 65 but improved to 71 without intervention and has now increased into the low to mid  100 range.  Home metformin has been held the patient's oral intake has now started to improve and blood sugars are now increasing with metformin restarted    Plan:   -Discharge without change to home metformin dosing  -Provide medium resistance sliding scale for hyperglycemia and TCU      Essential hypertension with goal blood pressure less than 140/90    Assessment: Patient is normally on lisinopril 10 mg daily.  Was transiently held due to persistent hypotension but blood pressures subsequently became consistently elevated and lisinopril has been restarted with blood pressures improving into normal range once more    Plan:   -Continue home lisinopril at time of discharge      Mixed hyperlipidemia    Assessment: On simvastatin at baseline    Plan:   -Continue simvastatin 20 mg daily as per home regimen at time of discharge       OBESITY    Assessment: Patient has BMI of 42    Plan:   -Continue supportive cares      Diabetic polyneuropathy associated with type 2 diabetes mellitus (H)    Assessment: Patient on previously has been on Lyrica for this condition however that was stopped at unknown timeframe and patient has been managing chronic pain with methadone    Plan:   -Continue chronic methadone dosing at time of discharge      Hypersomnia with sleep apnea    Assessment: Patient is supposed to be using CPAP device    Plan:   -No acute intervention needed but patient may benefit from repeat evaluation if she is desirous of treatment going forward      COPD (chronic obstructive pulmonary disease) (H)    Assessment: Previous documented history but details are unknown and patient does not appear to be on any long-acting or rescue medications for this.  No evidence of acute exacerbation    Plan:   -Continue to monitor respiratory status at the TCU       Paroxysmal atrial fibrillation (H)    Assessment: Previous history however patient is not on any rate controlling medications, does take Coumadin for anticoagulation  purposes with supratherapeutic INR is below    Plan:   -Continue monitor heart rate on routine vitals  -Appreciate pharmacy's assistance in Coumadin management during the stay and recommendations at time of discharge given new antibiotics and antifungals      Primary hyperparathyroidism (H)    Assessment: Previously diagnosed, however there has been debate as to whether or not she is truly hyperparathyroid or has severe vitamin D deficiency.      Plan:   -No acute intervention is needed       Chronic pain    Assessment: Patient is on methadone 5 mg at 8:00, 5 mg at noon, 10 mg at 3 PM, and 5 mg before bedtime    Plan:   -Continue home regimen and allow for as needed oral Dilaudid for breakthrough pain prior to wound care changes and therapy/significant ambulation given acute cause for pain as outlined above      Warfarin-induced coagulopathy (H)    Supratherapeutic INR    Assessment: INR was elevated with peak of 6.83 and has decreased to 1.79 today.  Patient did have small amount of hematuria present in her Braga catheter at time of arrival which has now resolved.  Is now on Bactrim and Diflucan which can both cause INR elevation    Plan:   -Discharge on Coumadin 1.2 mg every Monday Wednesday Friday and 2.5 mg all other days of the week with recheck INR on 6/5/2023 as per inpatient pharmacy recommendations.    Consultations This Hospital Stay   PHARMACY TO DOSE VANCO  VASCULAR ACCESS ADULT IP CONSULT  PHARMACY TO DOSE VANCO  PHARMACY TO DOSE WARFARIN  CARE MANAGEMENT / SOCIAL WORK IP CONSULT  PHYSICAL THERAPY ADULT IP CONSULT  WOUND OSTOMY CONTINENCE NURSE  IP CONSULT  PHYSICAL THERAPY ADULT IP CONSULT  OCCUPATIONAL THERAPY ADULT IP CONSULT    Code Status   Full Code    Time Spent on this Encounter   I, Velma Coles MD, personally saw the patient today and spent greater than 30 minutes discharging this patient.       Velma Coles MD  [address] MEDICAL SURGICAL  [address]  [place] [address]  Phone: [phone]  ______________________________________________________________________    Physical Exam   Vital Signs: Temp: 97.3  F (36.3  C) Temp src: Oral BP: (!) 143/69 Pulse: 70   Resp: 20 SpO2: 100 % O2 Device: None (Room air)    Weight: 258 lbs 8 oz  Constitutional: awake, alert, cooperative, no apparent distress, and appears stated age  Respiratory: No increased work of breathing, good air exchange, clear to auscultation bilaterally, no crackles or wheezing  Cardiovascular: Regular rate and rhythm  Neurologic: Alert, awake, oriented x4       Primary Care Physician   Ish Brown    Discharge Orders      Wound Care Referral      General info for SNF    Length of Stay Estimate: Short Term Care: Estimated # of Days 31-90  Condition at Discharge: Improving  Level of care:skilled   Rehabilitation Potential: Good  Admission H&P remains valid and up-to-date: Yes  Recent Chemotherapy: N/A  Use Nursing Home Standing Orders: Yes     Mantoux instructions    Give two-step Mantoux (PPD) Per Facility Policy Yes     Follow Up and recommended labs and tests    Follow up with Nursing home physician.    Labs should be drawn on 6/5/23 - BMP, CRP inflammation and INR. Further labs per nursing home provider and INR nurse.   Physician should work with wound care team (if applicable) to determine when jung catheter can be safely removed     Reason for your hospital stay    Sepsis caused by cellulitis of the skin from the ongoing open wounds and also a bladder infection.  You have improved greatly and will be going to [place] [facility] for ongoing strengthening and wound cares     Glucose monitor nursing POCT    Before meals and at bedtime     Jung catheter    To straight gravity drainage. Change catheter every 2 weeks and PRN for leaking or decreased urine output with signs of bladder distention.  Last exchange was 5/26/23.     Activity - Up with nursing assistance     Wound  care    Wound cares as follows:     - Cleanse all wounds with soap and water, saline or a no rinse dermal cleanser and dry gently with gauze.       - Buttocks and thigh pressure injuries and intertriginous dermatitis site;  Apply Triad paste to open wounds nickel thick, cover with Mepilex gentle adhesive foam dressings (or similar silicone based gentle adhesive foam dressings), change dressings daily and prn for soiling from internal drainage or external exposure to urine or other body fluids.       - Right lower leg venous stasis ulcer:  Keep clean and dry, resume use of compression garments when available.       - Left plantar foot 5th MTH base blood blister:  Keep clean and dry.  Keep elevated to avoid all pressure and friction.  If site opens recommend to clean and keep covered with dry gauze till provider or wound specialist can evaluate for new plan of care.  Goal would be to allow to re absorb and not rupture the blister.         - Left foot medial 1st MTH diabetic ulcer:  Keep clean and dry.  Allow eschar and hyperkeratotic tissue to come off on its own over time.     Physical Therapy Adult Consult    Evaluate and treat as clinically indicated.    Reason:  generalized weakness, numerous buttocks/perineal and leg wounds     Occupational Therapy Adult Consult    Evaluate and treat as clinically indicated.    Reason:  generalized weakness, numerous buttocks/perineal and leg wounds     Diet    Follow this diet upon discharge: Moderate Consistent Carb (60 g CHO per Meal) Diet       Significant Results and Procedures   Results for orders placed or performed during the hospital encounter of 05/26/23   XR Chest Port 1 View    Narrative    EXAM: XR CHEST PORT 1 VIEW  LOCATION: [facility]  DATE/TIME: 5/26/2023 8:53 PM CDT    INDICATION: PICC placement  COMPARISON: None.      Impression    IMPRESSION: Right-sided PICC line with tip over atrial caval junction. Borderline enlarged heart.  Mildly tortuous atherosclerotic aorta. No pneumothorax or pleural effusion. No acute osseous abnormality.       Discharge Medications   Current Discharge Medication List      START taking these medications    Details   acetaminophen (TYLENOL) 325 MG tablet Take 2 tablets (650 mg) by mouth every 6 hours as needed for mild pain or other (and adjunct with moderate or severe pain or per patient request)    Associated Diagnoses: Pressure injury of skin, unspecified injury stage, unspecified location      famotidine (PEPCID) 20 MG tablet Take 1 tablet (20 mg) by mouth 2 times daily    Associated Diagnoses: Gastroesophageal reflux disease without esophagitis      fluconazole (DIFLUCAN) 150 MG tablet Take 1 tablet (150 mg) by mouth every 72 hours for 3 doses  Qty: 3 tablet, Refills: 0    Associated Diagnoses: Candidiasis of vagina      HYDROmorphone (DILAUDID) 4 MG tablet Take 1 tablet (4 mg) by mouth every 6 hours as needed for breakthrough pain (from ulcers) - give one tablet 30-45 minutes before anticipated dressing change.  Qty: 10 tablet, Refills: 0    Associated Diagnoses: Pressure injury of skin, unspecified injury stage, unspecified location      miconazole (MICATIN) 2 % external powder Apply topically 2 times daily  Refills: 0    Associated Diagnoses: Candidiasis of skin      ondansetron (ZOFRAN ODT) 4 MG ODT tab Take 1 tablet (4 mg) by mouth every 6 hours as needed for nausea or vomiting    Associated Diagnoses: Nausea      sulfamethoxazole-trimethoprim (BACTRIM DS) 800-160 MG tablet Take 1 tablet by mouth 2 times daily for 9 days  Qty: 18 tablet, Refills: 0    Associated Diagnoses: Cellulitis of other specified site         CONTINUE these medications which have CHANGED    Details   !! insulin aspart (NOVOLOG PEN) 100 UNIT/ML pen Inject 1-7 Units Subcutaneous 3 times daily (before meals) Correction Scale - MEDIUM INSULIN RESISTANCE DOSING     Do Not give Correction Insulin if Pre-Meal BG less than 140.   For  Pre-Meal  - 189 give 1 unit.   For Pre-Meal  - 239 give 2 units.   For Pre-Meal  - 289 give 3 units.   For Pre-Meal  - 339 give 4 units.   For Pre-Meal - 399 give 5 units.   For Pre-Meal -449 give 6 units  For Pre-Meal BG greater than or equal to 450 give 7 units.   To be given with prandial insulin, and based on pre-meal blood glucose.    Notify provider if glucose greater than or equal to 350 mg/dL after administration of correction dose.  If given at mealtime, administer within 30 minutes of start of meal.  Qty: 15 mL    Associated Diagnoses: Type 2 diabetes mellitus with complication, with long-term current use of insulin (H)      !! insulin aspart (NOVOLOG PEN) 100 UNIT/ML pen Inject 1-5 Units Subcutaneous At Bedtime MEDIUM INSULIN RESISTANCE DOSING    Do Not give Bedtime Correction Insulin if BG less than  200.   For  - 249 give 1 units.   For  - 299 give 2 units.   For  - 349 give 3 units.   For  -399 give 4 units.   For BG greater than or equal to 400 give 5 units.  Notify provider if glucose greater than or equal to 350 mg/dL after administration of correction dose.  If given at mealtime, administer within 30 minutes of start of meal.  Qty: 15 mL    Associated Diagnoses: Type 2 diabetes mellitus with complication, with long-term current use of insulin (H)      !! methadone (DOLOPHINE) 10 MG tablet Take 1 tablet (10 mg) by mouth daily At 1500.  Take in addition to the 5 mg three times daily dose  Qty: 30 tablet, Refills: 0    Associated Diagnoses: Chronic pain syndrome      !! methadone (DOLOPHINE) 5 MG tablet Take 1 tablet (5 mg) by mouth 3 times daily At 0800, 1200 and 2100.  Take in addition to the 10 mg tablet at 1500.  Qty: 90 tablet, Refills: 0    Associated Diagnoses: Chronic pain syndrome      naloxone (NARCAN) 4 MG/0.1ML nasal spray Spray 1 spray (4 mg) into one nostril alternating nostrils as needed for opioid reversal every 2-3 minutes  until assistance arrives  Qty: 0.2 mL, Refills: 1    Associated Diagnoses: Chronic pain syndrome      warfarin ANTICOAGULANT (COUMADIN) 2.5 MG tablet Patient to take 0.5 tab (1.25 mg) by mouth on Monday, Wednesday and Friday.  Patient to take 1 tab (2.5 mg) by mouth Sunday, Tuesday, Thursday, and Saturday.  Or as directed by Coumadin clinic.    Associated Diagnoses: Paroxysmal atrial fibrillation (H)       !! - Potential duplicate medications found. Please discuss with provider.      CONTINUE these medications which have NOT CHANGED    Details   lisinopril (ZESTRIL) 10 MG tablet Take 10 mg by mouth daily      metFORMIN (GLUCOPHAGE XR) 500 MG 24 hr tablet Take 2,000 mg by mouth daily (with dinner)      ONE TOUCH TEST STRIPS TEST   VI use as directed  Qty: 100, Refills: prn    Associated Diagnoses: Type II or unspecified type diabetes mellitus without mention of complication, not stated as uncontrolled      pentoxifylline ER (TRENTAL) 400 MG CR tablet Take 400 mg by mouth 3 times daily (with meals)      simvastatin (ZOCOR) 20 MG tablet Take 20 mg by mouth At Bedtime      torsemide (DEMADEX) 10 MG tablet Take 10 mg by mouth 2 times daily      INSULIN PUMP ACCESSORIES MISC as directed  Qty: 1, Refills: one year    Associated Diagnoses: Type II or unspecified type diabetes mellitus with renal manifestations, uncontrolled(250.42) (H)      Warfarin Therapy Reminder Take 1 each by mouth See Admin Instructions See facility orders for current dose. Next INR 11/10/22         STOP taking these medications       HUMALOG 100 UNIT/ML SC SOLN Comments:   Reason for Stopping:         Lidocaine (LIDOCARE) 4 % Patch Comments:   Reason for Stopping:         menthol-zinc oxide (CALMOSEPTINE) 0.44-20.6 % OINT ointment Comments:   Reason for Stopping:         oxybutynin (DITROPAN) 5 MG tablet Comments:   Reason for Stopping:         pregabalin (LYRICA) 50 MG capsule Comments:   Reason for Stopping:             Allergies   Allergies    Allergen Reactions     Prednisone Nausea and Vomiting     Morphine Nausea and Vomiting     Morphine [Fumaric Acid] Nausea and Vomiting     Seasonal Allergies Difficulty breathing

## 2023-05-31 NOTE — TELEPHONE ENCOUNTER
New referral received for pt at discharge from [facility]. Pt will be at [facility]. Due to current clinic volumes and staff availability this month, called TCU and notified them that pt will be able to be seen more timely in [place] [facility]. Referral transferred to [facility] to [place].     Anastasia Gray RN, CWOCN  [phone]

## 2023-05-31 NOTE — PROGRESS NOTES
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  No    PAS Confirmation Code: [id number]    Patient/family educated on Medicare website which has current facility and service quality ratings: yes    Education Provided on the Discharge Plan: Yes    Persons Notified of Discharge Plans: PatientMarissa in admissions at [facility]     Patient/Family in Agreement with the Plan: yes    Handoff Referral Completed: Yes- External     Additional Information:  Patient is medically ready for discharge today.   has spoken with Marissa in admissions at [facility] (Main Phone:[phone] Admissions Phone:[phone] Fax: [phone]).  She has confirmed that patient is accepted there for TCU placement today.  Discussed that  is waiting for patient to be approved for STS (wheelchair transport).       has received call back from Najma from [facility] regarding STS transportation.  Patient is now approved for coverage for wheelchair transport and case # is #[id number].  Richardr is to call transport companies that accept medical assistance payment.       has called [facility] and they have no available transports for today.   has called and left a message with [facility], but they most likely do not contract with medical assistance.       had called [facility] #[phone].  Transport is set up for their arrival between 1:10 and 1:55 pm today.  [facility] billing department is sending a form for patient to review and sign regarding billing. Writer awaiting form to come through on fax machine.      9258- [facility] arrived at  approximately 1250 to transport patient. They did not fax writer the form from their billing office. Writer did provide the STS case number to [facility] over the phone for their billing.  Writer has spoken with Marissa at [facility] regarding the discharge time.  Writer visited with patient prior to [facility] arriving.  Patient in agreement with the discharge plan.       LIZY Pierce  [facility]   [phone]

## 2023-06-01 ENCOUNTER — TRANSITIONAL CARE UNIT VISIT (OUTPATIENT)
Dept: GERIATRICS | Facility: CLINIC | Age: 78
End: 2023-06-01
Payer: COMMERCIAL

## 2023-06-01 ENCOUNTER — LAB REQUISITION (OUTPATIENT)
Dept: LAB | Facility: CLINIC | Age: 78
End: 2023-06-01
Payer: COMMERCIAL

## 2023-06-01 VITALS
HEART RATE: 80 BPM | RESPIRATION RATE: 18 BRPM | HEIGHT: 66 IN | TEMPERATURE: 97.6 F | BODY MASS INDEX: 41.05 KG/M2 | WEIGHT: 255.4 LBS | DIASTOLIC BLOOD PRESSURE: 76 MMHG | OXYGEN SATURATION: 98 % | SYSTOLIC BLOOD PRESSURE: 134 MMHG

## 2023-06-01 DIAGNOSIS — L97.509 TYPE 2 DIABETES MELLITUS WITH FOOT ULCER, WITH LONG-TERM CURRENT USE OF INSULIN (H): Primary | ICD-10-CM

## 2023-06-01 DIAGNOSIS — U07.1 COVID-19: ICD-10-CM

## 2023-06-01 DIAGNOSIS — U07.1 INFECTION DUE TO 2019 NOVEL CORONAVIRUS: ICD-10-CM

## 2023-06-01 DIAGNOSIS — I89.0 LYMPHEDEMA: ICD-10-CM

## 2023-06-01 DIAGNOSIS — I48.91 UNSPECIFIED ATRIAL FIBRILLATION (H): ICD-10-CM

## 2023-06-01 DIAGNOSIS — I83.019 VENOUS STASIS ULCERS OF BOTH LOWER EXTREMITIES (H): ICD-10-CM

## 2023-06-01 DIAGNOSIS — N13.9 OBSTRUCTIVE UROPATHY: ICD-10-CM

## 2023-06-01 DIAGNOSIS — I83.029 VENOUS STASIS ULCERS OF BOTH LOWER EXTREMITIES (H): ICD-10-CM

## 2023-06-01 DIAGNOSIS — E11.621 TYPE 2 DIABETES MELLITUS WITH FOOT ULCER, WITH LONG-TERM CURRENT USE OF INSULIN (H): Primary | ICD-10-CM

## 2023-06-01 DIAGNOSIS — E66.01 MORBID OBESITY (H): ICD-10-CM

## 2023-06-01 DIAGNOSIS — L97.929 VENOUS STASIS ULCERS OF BOTH LOWER EXTREMITIES (H): ICD-10-CM

## 2023-06-01 DIAGNOSIS — L97.919 VENOUS STASIS ULCERS OF BOTH LOWER EXTREMITIES (H): ICD-10-CM

## 2023-06-01 DIAGNOSIS — I48.0 PAROXYSMAL ATRIAL FIBRILLATION (H): ICD-10-CM

## 2023-06-01 DIAGNOSIS — L89.309 PRESSURE INJURY OF SKIN OF BUTTOCK, UNSPECIFIED INJURY STAGE, UNSPECIFIED LATERALITY: ICD-10-CM

## 2023-06-01 DIAGNOSIS — Z79.4 TYPE 2 DIABETES MELLITUS WITH FOOT ULCER, WITH LONG-TERM CURRENT USE OF INSULIN (H): Primary | ICD-10-CM

## 2023-06-01 PROBLEM — R53.1 WEAKNESS: Status: ACTIVE | Noted: 2023-05-02

## 2023-06-01 PROBLEM — F11.20 OPIOID DEPENDENCE, UNCOMPLICATED (H): Status: ACTIVE | Noted: 2023-04-26

## 2023-06-01 LAB
BACTERIA BLD CULT: NO GROWTH
BACTERIA BLD CULT: NO GROWTH
SARS-COV-2 RNA RESP QL NAA+PROBE: POSITIVE

## 2023-06-01 PROCEDURE — 99310 SBSQ NF CARE HIGH MDM 45: CPT | Performed by: NURSE PRACTITIONER

## 2023-06-01 NOTE — PROGRESS NOTES
[facility]  Primary Care Provider & Clinic: Ish Brown MD, [facility] [address] [facility] [address]  Chief Complaint   Patient presents with     Hospital F/U     [facility] 5/26/2023 - 5/31/2023     [facility] Medical Record Number: [id number]  Place of Service Where Encounter Took Place: [facility] (TCU/SNF) [4000]    Linda Loredo is a 78 year old (1945), admitted to the above facility from  [facility]. Hospital stay 5/26/23 through 5/31/23.  Patient's living condition: lives alone    HPI:    Brief Summary of Hospital Course: treated for sepsis and worsening pressure injury and cellulitis of buttock. PMH of venous stasis ulcers on legs, morbid obesity, COPD, A fib on warfarin, BERLIN, DM2.   MEDICATION CHANGES: start acet PRN, famotidine, fluconazole x 3 days, hydromorphone 4 mg q 6 hrs prn prior to dressing changes. Miconazole pwder, zofran, bactrim x 9 days. Changed insulin aspart. STOP - lidocaine patch, calmoseptine, oxybutynin, pregabalin.   RECOMMENDED FOLLOW UP: Wound care [facility] wound [place].   Updates on Status Since Skilled nursing Admission: none    Today: patient reports wounds are painful with dressing changes. Reports that she hasn't had her dressing changed yet today and also hasn't had the lymph wraps applied yet. We talked about her blood sugars. At home this is managed with an insulin pump but she does not know the basal rate. Discussed starting a set dose at meals to help control blood sugars but patient preferred to leave as is because it was working well. She denies SOB, CP, problems sleepling, denies problems with appetite.     External notes reviewed: Facility EHR including progress notes, vital signs. Hospital discharge summary reviewed. Hospital discharge orders reviewed.     CODE STATUS/ADVANCE DIRECTIVES DISCUSSION: Full Code    ALLERGIES:   Allergies   Allergen Reactions      Prednisone Nausea and Vomiting     Morphine Nausea and Vomiting     Morphine [Fumaric Acid] Nausea and Vomiting     Seasonal Allergies Difficulty breathing      PAST MEDICAL HISTORY:   Past Medical History:   Diagnosis Date     Depressive disorder, not elsewhere classified      Herpes zoster without mention of complication      Hypercalcemia 2/24/2007     Mild intermittent asthma      Other urinary incontinence      Type II or unspecified type diabetes mellitus with renal manifestations, uncontrolled(250.42) (H)      Type II or unspecified type diabetes mellitus without mention of complication, not stated as uncontrolled      Unspecified essential hypertension       PAST SURGICAL HISTORY:   has a past surgical history that includes Cholecystectomy; ligation of fallopian tube; Open reduction internal fixation ankle (10/13/11); and pinning of left 2nd toe.  FAMILY HISTORY: family history includes Cancer in her maternal grandmother and paternal grandmother; Diabetes in her sister; Heart Disease in her father; Hypertension in her mother and sister; Psychotic Disorder in her sister; Respiratory in her sister.  SOCIAL HISTORY:   reports that she has never smoked. She has never used smokeless tobacco. She reports that she does not drink alcohol and does not use drugs.    Post Discharge Medication Reconciliation Status: discharge medications reconciled, continue medications without change  Current Outpatient Medications   Medication Sig     acetaminophen (TYLENOL) 325 MG tablet Take 2 tablets (650 mg) by mouth every 6 hours as needed for mild pain or other (and adjunct with moderate or severe pain or per patient request)     famotidine (PEPCID) 20 MG tablet Take 1 tablet (20 mg) by mouth 2 times daily     [START ON 6/3/2023] fluconazole (DIFLUCAN) 150 MG tablet Take 1 tablet (150 mg) by mouth every 72 hours for 3 doses     HYDROmorphone (DILAUDID) 4 MG tablet Take 1 tablet (4 mg) by mouth every 6 hours as needed for  breakthrough pain (from ulcers) - give one tablet 30-45 minutes before anticipated dressing change.     insulin aspart (NOVOLOG PEN) 100 UNIT/ML pen Inject 1-7 Units Subcutaneous 3 times daily (before meals) Correction Scale - MEDIUM INSULIN RESISTANCE DOSING     Do Not give Correction Insulin if Pre-Meal BG less than 140.   For Pre-Meal  - 189 give 1 unit.   For Pre-Meal  - 239 give 2 units.   For Pre-Meal  - 289 give 3 units.   For Pre-Meal  - 339 give 4 units.   For Pre-Meal - 399 give 5 units.   For Pre-Meal -449 give 6 units  For Pre-Meal BG greater than or equal to 450 give 7 units.   To be given with prandial insulin, and based on pre-meal blood glucose.    Notify provider if glucose greater than or equal to 350 mg/dL after administration of correction dose.  If given at mealtime, administer within 30 minutes of start of meal.     insulin aspart (NOVOLOG PEN) 100 UNIT/ML pen Inject 1-5 Units Subcutaneous At Bedtime MEDIUM INSULIN RESISTANCE DOSING    Do Not give Bedtime Correction Insulin if BG less than  200.   For  - 249 give 1 units.   For  - 299 give 2 units.   For  - 349 give 3 units.   For  -399 give 4 units.   For BG greater than or equal to 400 give 5 units.  Notify provider if glucose greater than or equal to 350 mg/dL after administration of correction dose.  If given at mealtime, administer within 30 minutes of start of meal.     INSULIN PUMP ACCESSORIES MISC as directed (Patient not taking: Reported on 5/26/2023)     lisinopril (ZESTRIL) 10 MG tablet Take 10 mg by mouth daily     metFORMIN (GLUCOPHAGE XR) 500 MG 24 hr tablet Take 2,000 mg by mouth daily (with dinner)     methadone (DOLOPHINE) 10 MG tablet Take 1 tablet (10 mg) by mouth daily At 1500.  Take in addition to the 5 mg three times daily dose     methadone (DOLOPHINE) 5 MG tablet Take 1 tablet (5 mg) by mouth 3 times daily At 0800, 1200 and 2100.  Take in addition to the 10 mg  "tablet at 1500.     miconazole (MICATIN) 2 % external powder Apply topically 2 times daily     naloxone (NARCAN) 4 MG/0.1ML nasal spray Spray 1 spray (4 mg) into one nostril alternating nostrils as needed for opioid reversal every 2-3 minutes until assistance arrives     ondansetron (ZOFRAN ODT) 4 MG ODT tab Take 1 tablet (4 mg) by mouth every 6 hours as needed for nausea or vomiting     ONE TOUCH TEST STRIPS TEST   VI use as directed (Patient taking differently: 1 strip by In Vitro route 4 times daily ACHS)     pentoxifylline ER (TRENTAL) 400 MG CR tablet Take 400 mg by mouth 3 times daily (with meals)     simvastatin (ZOCOR) 20 MG tablet Take 20 mg by mouth At Bedtime     sulfamethoxazole-trimethoprim (BACTRIM DS) 800-160 MG tablet Take 1 tablet by mouth 2 times daily for 9 days     torsemide (DEMADEX) 10 MG tablet Take 10 mg by mouth 2 times daily     warfarin ANTICOAGULANT (COUMADIN) 2.5 MG tablet Patient to take 0.5 tab (1.25 mg) by mouth on Monday, Wednesday and Friday.  Patient to take 1 tab (2.5 mg) by mouth Sunday, Tuesday, Thursday, and Saturday.  Or as directed by Coumadin clinic.     Warfarin Therapy Reminder Take 1 each by mouth See Admin Instructions See facility orders for current dose. Next INR 11/10/22     No current facility-administered medications for this visit.       ROS:  4 point ROS including Respiratory, CV, GI and , other than that noted in the HPI,  is negative    Vitals:  /76   Pulse 80   Temp 97.6  F (36.4  C)   Resp 18   Ht 1.676 m (5' 6\")   Wt 115.8 kg (255 lb 6.4 oz)   SpO2 98%   BMI 41.22 kg/m    Exam:  GENERAL APPEARANCE:  Alert, in no distress  RESP:  respiratory effort normal  CV:  edema + 3 pitting lower extrem.   M/S:  Gait and station - in recliner.    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 She would like to use it here but she can't tell me how she usually doses herself. So will wait on that. I offered to do a set dose at mealtimes based on what she was doing at home rather than the sliding scale she is on now and she did not want that.   - no changes  - continue QID blood sugar checks and follow up on Monday.     Obstructive uropathy  Braga placed during hospital stay. Leave in for wounds. Re-eval when wounds healing.     Pressure injury of skin of buttock, unspecified injury stage, unspecified laterality  Sitting in chair. Reviewed pressure relieving with her. And that the best thing to do would be to relieve the pressure. She showed me the pictures that were taken of her buttocks and wounds are extensive.   - continue nursing for wound care.     Lymphedema  Needs to be rewrapped.   - Ocupational Therapy     Morbid obesity (H)  BMI>30, is consistent with obesity, BMI>35 with complicating disease process or BMI>40 is consistent with morbid obesity. This is clinically significant due to increased nursing cares, use of resources and specialty equipment.     Venous stasis ulcers of both lower extremities (H)  Continue current cares and lymph therapy.     Paroxysmal atrial fibrillation (H)  Rate controlled. On warfarin for stroke prevention goal INR 2 - 3 .     Infection due to 2019 novel coronavirus  After my visit during chart review it was noted patient tested positive for COVID. Facility will follow their protocol. No symptoms. Vaccinated.     Orders:  No changes.     45 MINUTES SPENT BY ME on the date of service doing chart review, history, exam, documentation & further activities per the note.       Electronically signed by: Vickie  Stone, NP

## 2023-06-01 NOTE — LETTER
6/1/2023        RE: Linda Loredo  [address] [address]        [facility]  Primary Care Provider & Clinic: Ish rBown MD, [facility] MED[facility] [address] / [facility] [address]  Chief Complaint   Patient presents with     Hospital F/U     [facility] 5/26/2023 - 5/31/2023     [facility] Medical Record Number: [id number]  Place of Service Where Encounter Took Place: [facility] (TCU/SNF) [4000]    Linda Loredo is a 78 year old (1945), admitted to the above facility from  [facility]. Hospital stay 5/26/23 through 5/31/23.  Patient's living condition: lives alone    HPI:    Brief Summary of Hospital Course: treated for sepsis and worsening pressure injury and cellulitis of buttock. PMH of venous stasis ulcers on legs, morbid obesity, COPD, A fib on warfarin, BERLIN, DM2.   MEDICATION CHANGES: start acet PRN, famotidine, fluconazole x 3 days, hydromorphone 4 mg q 6 hrs prn prior to dressing changes. Miconazole pwder, zofran, bactrim x 9 days. Changed insulin aspart. STOP - lidocaine patch, calmoseptine, oxybutynin, pregabalin.   RECOMMENDED FOLLOW UP: Wound care [facility] wound [place].   Updates on Status Since Skilled nursing Admission: none    Today: patient reports wounds are painful with dressing changes. Reports that she hasn't had her dressing changed yet today and also hasn't had the lymph wraps applied yet. We talked about her blood sugars. At home this is managed with an insulin pump but she does not know the basal rate. Discussed starting a set dose at meals to help control blood sugars but patient preferred to leave as is because it was working well. She denies SOB, CP, problems sleepling, denies problems with appetite.     External notes reviewed: Facility EHR including progress notes, vital signs. Hospital discharge summary reviewed. Hospital discharge orders reviewed.      CODE STATUS/ADVANCE DIRECTIVES DISCUSSION: Full Code    ALLERGIES:   Allergies   Allergen Reactions     Prednisone Nausea and Vomiting     Morphine Nausea and Vomiting     Morphine [Fumaric Acid] Nausea and Vomiting     Seasonal Allergies Difficulty breathing      PAST MEDICAL HISTORY:   Past Medical History:   Diagnosis Date     Depressive disorder, not elsewhere classified      Herpes zoster without mention of complication      Hypercalcemia 2/24/2007     Mild intermittent asthma      Other urinary incontinence      Type II or unspecified type diabetes mellitus with renal manifestations, uncontrolled(250.42) (H)      Type II or unspecified type diabetes mellitus without mention of complication, not stated as uncontrolled      Unspecified essential hypertension       PAST SURGICAL HISTORY:   has a past surgical history that includes Cholecystectomy; ligation of fallopian tube; Open reduction internal fixation ankle (10/13/11); and pinning of left 2nd toe.  FAMILY HISTORY: family history includes Cancer in her maternal grandmother and paternal grandmother; Diabetes in her sister; Heart Disease in her father; Hypertension in her mother and sister; Psychotic Disorder in her sister; Respiratory in her sister.  SOCIAL HISTORY:   reports that she has never smoked. She has never used smokeless tobacco. She reports that she does not drink alcohol and does not use drugs.    Post Discharge Medication Reconciliation Status: discharge medications reconciled, continue medications without change  Current Outpatient Medications   Medication Sig     acetaminophen (TYLENOL) 325 MG tablet Take 2 tablets (650 mg) by mouth every 6 hours as needed for mild pain or other (and adjunct with moderate or severe pain or per patient request)     famotidine (PEPCID) 20 MG tablet Take 1 tablet (20 mg) by mouth 2 times daily     [START ON 6/3/2023] fluconazole (DIFLUCAN) 150 MG tablet Take 1 tablet (150 mg) by mouth every 72 hours for 3  doses     HYDROmorphone (DILAUDID) 4 MG tablet Take 1 tablet (4 mg) by mouth every 6 hours as needed for breakthrough pain (from ulcers) - give one tablet 30-45 minutes before anticipated dressing change.     insulin aspart (NOVOLOG PEN) 100 UNIT/ML pen Inject 1-7 Units Subcutaneous 3 times daily (before meals) Correction Scale - MEDIUM INSULIN RESISTANCE DOSING     Do Not give Correction Insulin if Pre-Meal BG less than 140.   For Pre-Meal  - 189 give 1 unit.   For Pre-Meal  - 239 give 2 units.   For Pre-Meal  - 289 give 3 units.   For Pre-Meal  - 339 give 4 units.   For Pre-Meal - 399 give 5 units.   For Pre-Meal -449 give 6 units  For Pre-Meal BG greater than or equal to 450 give 7 units.   To be given with prandial insulin, and based on pre-meal blood glucose.    Notify provider if glucose greater than or equal to 350 mg/dL after administration of correction dose.  If given at mealtime, administer within 30 minutes of start of meal.     insulin aspart (NOVOLOG PEN) 100 UNIT/ML pen Inject 1-5 Units Subcutaneous At Bedtime MEDIUM INSULIN RESISTANCE DOSING    Do Not give Bedtime Correction Insulin if BG less than  200.   For  - 249 give 1 units.   For  - 299 give 2 units.   For  - 349 give 3 units.   For  -399 give 4 units.   For BG greater than or equal to 400 give 5 units.  Notify provider if glucose greater than or equal to 350 mg/dL after administration of correction dose.  If given at mealtime, administer within 30 minutes of start of meal.     INSULIN PUMP ACCESSORIES MISC as directed (Patient not taking: Reported on 5/26/2023)     lisinopril (ZESTRIL) 10 MG tablet Take 10 mg by mouth daily     metFORMIN (GLUCOPHAGE XR) 500 MG 24 hr tablet Take 2,000 mg by mouth daily (with dinner)     methadone (DOLOPHINE) 10 MG tablet Take 1 tablet (10 mg) by mouth daily At 1500.  Take in addition to the 5 mg three times daily dose     methadone (DOLOPHINE) 5 MG  "tablet Take 1 tablet (5 mg) by mouth 3 times daily At 0800, 1200 and 2100.  Take in addition to the 10 mg tablet at 1500.     miconazole (MICATIN) 2 % external powder Apply topically 2 times daily     naloxone (NARCAN) 4 MG/0.1ML nasal spray Spray 1 spray (4 mg) into one nostril alternating nostrils as needed for opioid reversal every 2-3 minutes until assistance arrives     ondansetron (ZOFRAN ODT) 4 MG ODT tab Take 1 tablet (4 mg) by mouth every 6 hours as needed for nausea or vomiting     ONE TOUCH TEST STRIPS TEST   VI use as directed (Patient taking differently: 1 strip by In Vitro route 4 times daily ACHS)     pentoxifylline ER (TRENTAL) 400 MG CR tablet Take 400 mg by mouth 3 times daily (with meals)     simvastatin (ZOCOR) 20 MG tablet Take 20 mg by mouth At Bedtime     sulfamethoxazole-trimethoprim (BACTRIM DS) 800-160 MG tablet Take 1 tablet by mouth 2 times daily for 9 days     torsemide (DEMADEX) 10 MG tablet Take 10 mg by mouth 2 times daily     warfarin ANTICOAGULANT (COUMADIN) 2.5 MG tablet Patient to take 0.5 tab (1.25 mg) by mouth on Monday, Wednesday and Friday.  Patient to take 1 tab (2.5 mg) by mouth Sunday, Tuesday, Thursday, and Saturday.  Or as directed by Coumadin clinic.     Warfarin Therapy Reminder Take 1 each by mouth See Admin Instructions See facility orders for current dose. Next INR 11/10/22     No current facility-administered medications for this visit.       ROS:  4 point ROS including Respiratory, CV, GI and , other than that noted in the HPI,  is negative    Vitals:  /76   Pulse 80   Temp 97.6  F (36.4  C)   Resp 18   Ht 1.676 m (5' 6\")   Wt 115.8 kg (255 lb 6.4 oz)   SpO2 98%   BMI 41.22 kg/m    Exam:  GENERAL APPEARANCE:  Alert, in no distress  RESP:  respiratory effort normal  CV:  edema + 3 pitting lower extrem.   M/S:  Gait and station - in recliner.    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 She would like to use it here but she can't tell me how she usually doses herself. So will wait on that. I offered to do a set dose at mealtimes based on what she was doing at home rather than the sliding scale she is on now and she did not want that.   - no changes  - continue QID blood sugar checks and follow up on Monday.     Obstructive uropathy  Braga placed during hospital stay. Leave in for wounds. Re-eval when wounds healing.     Pressure injury of skin of buttock, unspecified injury stage, unspecified laterality  Sitting in chair. Reviewed pressure relieving with her. And that the best thing to do would be to relieve the pressure. She showed me the pictures that were taken of her buttocks and wounds are extensive.   - continue nursing for wound care.     Lymphedema  Needs to be rewrapped.   - Ocupational Therapy     Morbid obesity (H)  BMI>30, is consistent with obesity, BMI>35 with complicating disease process or BMI>40 is consistent with morbid obesity. This is clinically significant due to increased nursing cares, use of resources and specialty equipment.     Venous stasis ulcers of both lower extremities (H)  Continue current cares and lymph therapy.     Paroxysmal atrial fibrillation (H)  Rate controlled. On warfarin for stroke prevention goal INR 2 - 3 .     Infection due to 2019 novel coronavirus  After my visit during chart review it was noted patient tested positive for COVID. Facility will follow their protocol. No symptoms. Vaccinated.     Orders:  No changes.     45 MINUTES SPENT BY ME on the date of service doing chart  review, history, exam, documentation & further activities per the note.       Electronically signed by: Vickie Ventura NP          Sincerely,        Vickie Ventura NP

## 2023-06-05 ENCOUNTER — TRANSITIONAL CARE UNIT VISIT (OUTPATIENT)
Dept: GERIATRICS | Facility: CLINIC | Age: 78
End: 2023-06-05
Payer: COMMERCIAL

## 2023-06-05 VITALS
DIASTOLIC BLOOD PRESSURE: 67 MMHG | HEART RATE: 89 BPM | HEIGHT: 66 IN | OXYGEN SATURATION: 93 % | SYSTOLIC BLOOD PRESSURE: 130 MMHG | WEIGHT: 268 LBS | RESPIRATION RATE: 18 BRPM | BODY MASS INDEX: 43.07 KG/M2 | TEMPERATURE: 97.7 F

## 2023-06-05 DIAGNOSIS — I83.019 VENOUS STASIS ULCERS OF BOTH LOWER EXTREMITIES (H): ICD-10-CM

## 2023-06-05 DIAGNOSIS — L97.929 VENOUS STASIS ULCERS OF BOTH LOWER EXTREMITIES (H): ICD-10-CM

## 2023-06-05 DIAGNOSIS — L97.919 VENOUS STASIS ULCERS OF BOTH LOWER EXTREMITIES (H): ICD-10-CM

## 2023-06-05 DIAGNOSIS — E66.01 MORBID OBESITY (H): ICD-10-CM

## 2023-06-05 DIAGNOSIS — Z79.4 TYPE 2 DIABETES MELLITUS WITH FOOT ULCER, WITH LONG-TERM CURRENT USE OF INSULIN (H): ICD-10-CM

## 2023-06-05 DIAGNOSIS — L97.509 TYPE 2 DIABETES MELLITUS WITH FOOT ULCER, WITH LONG-TERM CURRENT USE OF INSULIN (H): ICD-10-CM

## 2023-06-05 DIAGNOSIS — I83.029 VENOUS STASIS ULCERS OF BOTH LOWER EXTREMITIES (H): ICD-10-CM

## 2023-06-05 DIAGNOSIS — L89.309 PRESSURE INJURY OF SKIN OF BUTTOCK, UNSPECIFIED INJURY STAGE, UNSPECIFIED LATERALITY: Primary | ICD-10-CM

## 2023-06-05 DIAGNOSIS — U07.1 INFECTION DUE TO 2019 NOVEL CORONAVIRUS: ICD-10-CM

## 2023-06-05 DIAGNOSIS — E11.621 TYPE 2 DIABETES MELLITUS WITH FOOT ULCER, WITH LONG-TERM CURRENT USE OF INSULIN (H): ICD-10-CM

## 2023-06-05 DIAGNOSIS — I48.0 PAROXYSMAL ATRIAL FIBRILLATION (H): ICD-10-CM

## 2023-06-05 DIAGNOSIS — I89.0 LYMPHEDEMA: ICD-10-CM

## 2023-06-05 LAB
ANION GAP SERPL CALCULATED.3IONS-SCNC: 13 MMOL/L (ref 7–15)
BUN SERPL-MCNC: 22.9 MG/DL (ref 8–23)
CALCIUM SERPL-MCNC: 11.3 MG/DL (ref 8.8–10.2)
CHLORIDE SERPL-SCNC: 99 MMOL/L (ref 98–107)
CREAT SERPL-MCNC: 1.4 MG/DL (ref 0.51–0.95)
CRP SERPL-MCNC: 13.8 MG/L
DEPRECATED HCO3 PLAS-SCNC: 26 MMOL/L (ref 22–29)
GFR SERPL CREATININE-BSD FRML MDRD: 38 ML/MIN/1.73M2
GLUCOSE SERPL-MCNC: 153 MG/DL (ref 70–99)
INR PPP: 2.92 (ref 0.85–1.15)
POTASSIUM SERPL-SCNC: 4.4 MMOL/L (ref 3.4–5.3)
SODIUM SERPL-SCNC: 138 MMOL/L (ref 136–145)

## 2023-06-05 PROCEDURE — 85610 PROTHROMBIN TIME: CPT | Mod: ORL | Performed by: FAMILY MEDICINE

## 2023-06-05 PROCEDURE — 36415 COLL VENOUS BLD VENIPUNCTURE: CPT | Mod: ORL | Performed by: FAMILY MEDICINE

## 2023-06-05 PROCEDURE — 80048 BASIC METABOLIC PNL TOTAL CA: CPT | Mod: ORL | Performed by: FAMILY MEDICINE

## 2023-06-05 PROCEDURE — 99310 SBSQ NF CARE HIGH MDM 45: CPT | Performed by: NURSE PRACTITIONER

## 2023-06-05 PROCEDURE — P9603 ONE-WAY ALLOW PRORATED MILES: HCPCS | Mod: ORL | Performed by: FAMILY MEDICINE

## 2023-06-05 PROCEDURE — 86140 C-REACTIVE PROTEIN: CPT | Mod: ORL | Performed by: FAMILY MEDICINE

## 2023-06-05 NOTE — LETTER
6/5/2023        RE: Linda Loredo  [address] [facility] [address]        [facility] GERIATRICS  TCU FOLLOW UP VISIT    Chief Complaint   Patient presents with     RECHECK      Place of Service where encounter took place:  [facility] (TCU/SNF) [4000]    Linda Loredo  is a 78 year old  (1945), who is being seen today at the above facility to discuss progress in therapy, review nursing home EHR, recheck chronic medical problems as well as address any new concerns.       HPI:    Copied forward from previous note: admitted to the above facility from  [facility]. Hospital stay 5/26/23 through 5/31/23.  Patient's living condition: lives alone  Brief Summary of Hospital Course: treated for sepsis and worsening pressure injury and cellulitis of buttock. PMH of venous stasis ulcers on legs, morbid obesity, COPD, A fib on warfarin, BERLIN, DM2.   MEDICATION CHANGES: start acet PRN, famotidine, fluconazole x 3 days, hydromorphone 4 mg q 6 hrs prn prior to dressing changes. Miconazole pwder, zofran, bactrim x 9 days. Changed insulin aspart. STOP - lidocaine patch, calmoseptine, oxybutynin, pregabalin.   RECOMMENDED FOLLOW UP: Wound care [facility] wound [place].   Updates on Status Since Skilled nursing Admission: 6/1 NP visit.     Today: patient expresses frustration over being quarintined to her room. We discussed at length barriers to her getting off her bottom and lying in bed. She denies pain, shortness of breath, itching, anxiety are causing her problems when she lies in bed. She reports that she is in bed about 4 hours out of a 24 hour period. She states that if she could get up and walk she could stay off her bottom. I educated patient that it is her right to do what she wants but the wound will get worse if we do not take the pressure off and keep the moisture low. I would like to see her in bed for 12 hours out of 24 hours  "for healing. I again asked if there was anything we could do to increase the amount of time she spends in bed and she states there is not. She is able to shift her weight in her chair and stand up on her own in her room. Which she does.     Nurse reports that patient refuses dressing changes x last 2 days.  INR is 3.92. refusing to allow pressure reduction efforts. Therapy reports edema is better, has lymph wraps from home that she is going to bring in. Consultant pharmacist recomends decresae of pepcid due to kidney function. Also noted there is no stop date on the fluconazole. I again reviewed discharge orders ffrom the hospital.    Nursing home EHR reviewed including bm record, porgress notes, vital signs and MAR. No bm docuemented but patient states she is going.     ALLERGIES: Prednisone, Morphine, Morphine [fumaric acid], and Seasonal allergies    ROS:  4 point ROS including Respiratory, CV, GI and , other than that noted in the HPI,  is negative    Vitals:  /67   Pulse 89   Temp 97.7  F (36.5  C)   Resp 18   Ht 1.676 m (5' 6\")   Wt 121.6 kg (268 lb)   SpO2 93%   BMI 43.26 kg/m    Exam:  GENERAL APPEARANCE:  Alert, in no distress  RESP:  respiratory effort normal  CV:  edema + 2 pitting lower extrem.   M/S:  Gait and station - in recliner.    SKIN:  Inspection and palpation of skin and subcutaneous tissue at baseline. Wounds on right thigh observed. Patient had removed the dressing stating that there was no sore there. But there actually is an open area where the dressing was.   PSYCH:  insight and judgement, memory seems intact, affect and mood normal    ASSESSMENT/PLAN:  Pressure injury of skin of buttock, unspecified injury stage, unspecified laterality  Sitting in chair. Again reviewed that the only thing that will help the wounds heal and get better is getting off her bottom. Patient adamantly refuses despite counseling. Encouraged patient to allow wound care from the nurses.   - continue " nursing for wound care.   - add multivite  - monitor for infection    Type 2 diabetes mellitus with foot ulcer, with long-term current use of insulin (H)  A1c 5/27/23 8.2, 2/2/23 8.2 10/27/22 7.6  Follows with endocrinology and uses an insulin pump at baseline. She would like to use it here but she can't tell me how she usually doses herself. So will wait on that.   - no changes  - continue QID blood sugar checks      Lymphedema  Improving. Continue Ocupational Therapy      Morbid obesity (H)  BMI>30, is consistent with obesity, BMI>35 with complicating disease process or BMI>40 is consistent with morbid obesity. This is clinically significant due to increased nursing cares, use of resources and specialty equipment. Complicating healing of her      Venous stasis ulcers of both lower extremities (H)  Continue current cares and lymph therapy.      Paroxysmal atrial fibrillation (H)  Rate controlled. On warfarin for stroke prevention goal INR 2 - 3 .      Infection due to 2019 novel coronavirus  Continues without symptoms. Patient frustrated with quarantine.     Orders:  - decrease famotidine to 20 mg po every day  - admit orders state to administer fluconazole q 72 hours x 3 doses  - INR 2.92 warfarin 1.25 mg po on monday and Wednesday. 2.5 mg on Tuesday.   - recheck inr on Thursday.     Electronically signed by: Vickie Ventura NP          Sincerely,        Vickie Ventura NP

## 2023-06-06 NOTE — PROGRESS NOTES
[facility] GERIATRICS  TCU FOLLOW UP VISIT    Chief Complaint   Patient presents with     RECHECK      Place of Service where encounter took place:  [facility] (TCU/SNF) [[id number]]    Linda Loredo  is a 78 year old  (1945), who is being seen today at the above facility to discuss progress in therapy, review nursing home EHR, recheck chronic medical problems as well as address any new concerns.       HPI:    Copied forward from previous note: admitted to the above facility from  [facility]. Hospital stay 5/26/23 through 5/31/23.  Patient's living condition: lives alone  Brief Summary of Hospital Course: treated for sepsis and worsening pressure injury and cellulitis of buttock. PMH of venous stasis ulcers on legs, morbid obesity, COPD, A fib on warfarin, BERLIN, DM2.   MEDICATION CHANGES: start acet PRN, famotidine, fluconazole x 3 days, hydromorphone 4 mg q 6 hrs prn prior to dressing changes. Miconazole pwder, zofran, bactrim x 9 days. Changed insulin aspart. STOP - lidocaine patch, calmoseptine, oxybutynin, pregabalin.   [facility]   Updates on Status Since Skilled nursing Admission: 6/1 NP visit.     Today: patient expresses frustration over being quarintined to her room. We discussed at length barriers to her getting off her bottom and lying in bed. She denies pain, shortness of breath, itching, anxiety are causing her problems when she lies in bed. She reports that she is in bed about 4 hours out of a 24 hour period. She states that if she could get up and walk she could stay off her bottom. I educated patient that it is her right to do what she wants but the wound will get worse if we do not take the pressure off and keep the moisture low. I would like to see her in bed for 12 hours out of 24 hours for healing. I again asked if there was anything we could do to increase the amount of time she spends in  "bed and she states there is not. She is able to shift her weight in her chair and stand up on her own in her room. Which she does.     Nurse reports that patient refuses dressing changes x last 2 days.  INR is 3.92. refusing to allow pressure reduction efforts. Therapy reports edema is better, has lymph wraps from home that she is going to bring in. Consultant pharmacist recomends decresae of pepcid due to kidney function. Also noted there is no stop date on the fluconazole. I again reviewed discharge orders ffrom the hospital.    Nursing home EHR reviewed including bm record, porgress notes, vital signs and MAR. No bm docuemented but patient states she is going.     ALLERGIES: Prednisone, Morphine, Morphine [fumaric acid], and Seasonal allergies    ROS:  4 point ROS including Respiratory, CV, GI and , other than that noted in the HPI,  is negative    Vitals:  /67   Pulse 89   Temp 97.7  F (36.5  C)   Resp 18   Ht 1.676 m (5' 6\")   Wt 121.6 kg (268 lb)   SpO2 93%   BMI 43.26 kg/m    Exam:  GENERAL APPEARANCE:  Alert, in no distress  RESP:  respiratory effort normal  CV:  edema + 2 pitting lower extrem.   M/S:  Gait and station - in recliner.    SKIN:  Inspection and palpation of skin and subcutaneous tissue at baseline. Wounds on right thigh observed. Patient had removed the dressing stating that there was no sore there. But there actually is an open area where the dressing was.   PSYCH:  insight and judgement, memory seems intact, affect and mood normal    ASSESSMENT/PLAN:  Pressure injury of skin of buttock, unspecified injury stage, unspecified laterality  Sitting in chair. Again reviewed that the only thing that will help the wounds heal and get better is getting off her bottom. Patient adamantly refuses despite counseling. Encouraged patient to allow wound care from the nurses.   - continue nursing for wound care.   - add multivite  - monitor for infection    Type 2 diabetes mellitus with foot " ulcer, with long-term current use of insulin (H)  A1c 5/27/23 8.2, 2/2/23 8.2 10/27/22 7.6  Follows with endocrinology and uses an insulin pump at baseline. She would like to use it here but she can't tell me how she usually doses herself. So will wait on that.   - no changes  - continue QID blood sugar checks      Lymphedema  Improving. Continue Ocupational Therapy      Morbid obesity (H)  BMI>30, is consistent with obesity, BMI>35 with complicating disease process or BMI>40 is consistent with morbid obesity. This is clinically significant due to increased nursing cares, use of resources and specialty equipment. Complicating healing of her      Venous stasis ulcers of both lower extremities (H)  Continue current cares and lymph therapy.      Paroxysmal atrial fibrillation (H)  Rate controlled. On warfarin for stroke prevention goal INR 2 - 3 .      Infection due to 2019 novel coronavirus  Continues without symptoms. Patient frustrated with quarantine.     Orders:  - decrease famotidine to 20 mg po every day  - admit orders state to administer fluconazole q 72 hours x 3 doses  - INR 2.92 warfarin 1.25 mg po on monday and Wednesday. 2.5 mg on Tuesday.   - recheck inr on Thursday.     Electronically signed by: Vickie Ventura NP

## 2023-06-07 ENCOUNTER — LAB REQUISITION (OUTPATIENT)
Dept: LAB | Facility: CLINIC | Age: 78
End: 2023-06-07
Payer: COMMERCIAL

## 2023-06-07 DIAGNOSIS — I48.91 UNSPECIFIED ATRIAL FIBRILLATION (H): ICD-10-CM

## 2023-06-08 ENCOUNTER — TRANSITIONAL CARE UNIT VISIT (OUTPATIENT)
Dept: GERIATRICS | Facility: CLINIC | Age: 78
End: 2023-06-08
Payer: COMMERCIAL

## 2023-06-08 VITALS
TEMPERATURE: 98.6 F | OXYGEN SATURATION: 93 % | RESPIRATION RATE: 18 BRPM | BODY MASS INDEX: 43.07 KG/M2 | SYSTOLIC BLOOD PRESSURE: 117 MMHG | HEIGHT: 66 IN | DIASTOLIC BLOOD PRESSURE: 78 MMHG | HEART RATE: 85 BPM | WEIGHT: 268 LBS

## 2023-06-08 DIAGNOSIS — L97.509 TYPE 2 DIABETES MELLITUS WITH FOOT ULCER, WITH LONG-TERM CURRENT USE OF INSULIN (H): ICD-10-CM

## 2023-06-08 DIAGNOSIS — I83.029 VENOUS STASIS ULCERS OF BOTH LOWER EXTREMITIES (H): ICD-10-CM

## 2023-06-08 DIAGNOSIS — I89.0 LYMPHEDEMA: ICD-10-CM

## 2023-06-08 DIAGNOSIS — E11.621 TYPE 2 DIABETES MELLITUS WITH FOOT ULCER, WITH LONG-TERM CURRENT USE OF INSULIN (H): ICD-10-CM

## 2023-06-08 DIAGNOSIS — E66.01 MORBID OBESITY (H): ICD-10-CM

## 2023-06-08 DIAGNOSIS — L97.919 VENOUS STASIS ULCERS OF BOTH LOWER EXTREMITIES (H): ICD-10-CM

## 2023-06-08 DIAGNOSIS — I48.0 PAROXYSMAL ATRIAL FIBRILLATION (H): ICD-10-CM

## 2023-06-08 DIAGNOSIS — L89.309 PRESSURE INJURY OF SKIN OF BUTTOCK, UNSPECIFIED INJURY STAGE, UNSPECIFIED LATERALITY: Primary | ICD-10-CM

## 2023-06-08 DIAGNOSIS — L97.929 VENOUS STASIS ULCERS OF BOTH LOWER EXTREMITIES (H): ICD-10-CM

## 2023-06-08 DIAGNOSIS — I83.019 VENOUS STASIS ULCERS OF BOTH LOWER EXTREMITIES (H): ICD-10-CM

## 2023-06-08 DIAGNOSIS — Z79.4 TYPE 2 DIABETES MELLITUS WITH FOOT ULCER, WITH LONG-TERM CURRENT USE OF INSULIN (H): ICD-10-CM

## 2023-06-08 LAB — INR PPP: 3.91 (ref 0.85–1.15)

## 2023-06-08 PROCEDURE — 36415 COLL VENOUS BLD VENIPUNCTURE: CPT | Mod: ORL | Performed by: NURSE PRACTITIONER

## 2023-06-08 PROCEDURE — P9604 ONE-WAY ALLOW PRORATED TRIP: HCPCS | Mod: ORL | Performed by: NURSE PRACTITIONER

## 2023-06-08 PROCEDURE — 85610 PROTHROMBIN TIME: CPT | Mod: ORL | Performed by: NURSE PRACTITIONER

## 2023-06-08 PROCEDURE — 99309 SBSQ NF CARE MODERATE MDM 30: CPT | Performed by: NURSE PRACTITIONER

## 2023-06-08 NOTE — LETTER
6/8/2023        RE: Linda Loredo  [address] [address] [address]        [facility] GERIATRICS  TCU FOLLOW UP VISIT    Chief Complaint   Patient presents with     RECHECK      Place of Service where encounter took place:  [facility] (TCU/SNF) [[id number]]    Linda Loredo  is a 78 year old  (1945), who is being seen today at the above facility to discuss progress in therapy, review nursing home EHR, recheck chronic medical problems as well as address any new concerns.       HPI:    Copied forward from previous note: admitted to the above facility from  [facility]. Hospital stay 5/26/23 through 5/31/23.  Patient's living condition: lives alone  Brief Summary of Hospital Course: treated for sepsis and worsening pressure injury and cellulitis of buttock. PMH of venous stasis ulcers on legs, morbid obesity, COPD, A fib on warfarin, BERLIN, DM2.   MEDICATION CHANGES: start acet PRN, famotidine, fluconazole x 3 days, hydromorphone 4 mg q 6 hrs prn prior to dressing changes. Miconazole pwder, zofran, bactrim x 9 days. Changed insulin aspart. STOP - lidocaine patch, calmoseptine, oxybutynin, pregabalin.   RECOMMENDED FOLLOW UP: Wound care [facility] wound [place].   Updates on Status Since Skilled nursing Admission: 6/1 NP visit.   6/5 - decrease famotidine to 20 mg po every day  - admit orders state to administer fluconazole q 72 hours x 3 doses  - INR 2.92 warfarin 1.25 mg po on monday and Wednesday. 2.5 mg on Tuesday.   - recheck inr on Thursday.     Today: INR today is 3.91. fluconazole and bactrim are done soon. She denies bleeding and clot. She denies constipation states she is going on her own. She continues with the jung, for now. She states she is looking forward to walking in hte halls tomorrow as her quarantine is done. Facility EHR shows no bm since admit. Last vitals documented on 6/5. Her blood sugars are 150 - 250. She  "is getting sliding scale novolog and happy with his regimen.     ALLERGIES: Prednisone, Morphine, Morphine [fumaric acid], and Seasonal allergies    ROS:  4 point ROS including Respiratory, CV, GI and , other than that noted in the HPI,  is negative    Vitals:  /78   Pulse 85   Temp 98.6  F (37  C)   Resp 18   Ht 1.676 m (5' 6\")   Wt 121.6 kg (268 lb)   SpO2 93%   BMI 43.26 kg/m    Exam:  GENERAL APPEARANCE:  Alert, in no distress  RESP:  respiratory effort normal  CV:  edema HAYDER has wraps on.   M/S:  Gait and station - in recliner.    SKIN:  Inspection and palpation of skin and subcutaneous tissue at baseline.   PSYCH:  insight and judgement, memory seems intact, affect and mood normal    ASSESSMENT/PLAN:  Pressure injury of skin of buttock, unspecified injury stage, unspecified laterality  Sitting in chair.   - continue nursing for wound care.   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   - no changes  - continue QID blood sugar checks      Lymphedema  Improving. Continue Ocupational Therapy      Venous stasis ulcers of both lower extremities (H)  Continue current cares and lymph therapy.      Paroxysmal atrial fibrillation (H)  Rate controlled. On warfarin for stroke prevention goal INR 2 - 3 .      Infection due to 2019 novel coronavirus  Continues without symptoms.     Orders:  - inr 3.91. new orders given. Recheck in 1 week.       Electronically signed by: Vickie Ventura NP          Sincerely,        Vickie Ventura NP      "

## 2023-06-09 ENCOUNTER — LAB REQUISITION (OUTPATIENT)
Dept: LAB | Facility: CLINIC | Age: 78
End: 2023-06-09
Payer: COMMERCIAL

## 2023-06-09 DIAGNOSIS — I48.91 UNSPECIFIED ATRIAL FIBRILLATION (H): ICD-10-CM

## 2023-06-09 NOTE — PROGRESS NOTES
[facility] GERIATRICS  TCU FOLLOW UP VISIT    Chief Complaint   Patient presents with     RECHECK      Place of Service where encounter took place:  [facility] (TCU/SNF) [4000]    Linda Loredo  is a 78 year old  (1945), who is being seen today at the above facility to discuss progress in therapy, review nursing home EHR, recheck chronic medical problems as well as address any new concerns.       HPI:    Copied forward from previous note: admitted to the above facility from  [facility]. Hospital stay 5/26/23 through 5/31/23.  Patient's living condition: lives alone  Brief Summary of Hospital Course: treated for sepsis and worsening pressure injury and cellulitis of buttock. PMH of venous stasis ulcers on legs, morbid obesity, COPD, A fib on warfarin, BERLIN, DM2.   MEDICATION CHANGES: start acet PRN, famotidine, fluconazole x 3 days, hydromorphone 4 mg q 6 hrs prn prior to dressing changes. Miconazole pwder, zofran, bactrim x 9 days. Changed insulin aspart. STOP - lidocaine patch, calmoseptine, oxybutynin, pregabalin.   RECOMMENDED FOLLOW UP: Wound care [facility] wound [place].   Updates on Status Since Skilled nursing Admission: 6/1 NP visit.   6/5 - decrease famotidine to 20 mg po every day  - admit orders state to administer fluconazole q 72 hours x 3 doses  - INR 2.92 warfarin 1.25 mg po on monday and Wednesday. 2.5 mg on Tuesday.   - recheck inr on Thursday.     Today: INR today is 3.91. fluconazole and bactrim are done soon. She denies bleeding and clot. She denies constipation states she is going on her own. She continues with the jung, for now. She states she is looking forward to walking in hte halls tomorrow as her quarantine is done. Facility EHR shows no bm since admit. Last vitals documented on 6/5. Her blood sugars are 150 - 250. She is getting sliding scale novolog and happy with his regimen.     ALLERGIES: Prednisone, Morphine,  "Morphine [fumaric acid], and Seasonal allergies    ROS:  4 point ROS including Respiratory, CV, GI and , other than that noted in the HPI,  is negative    Vitals:  /78   Pulse 85   Temp 98.6  F (37  C)   Resp 18   Ht 1.676 m (5' 6\")   Wt 121.6 kg (268 lb)   SpO2 93%   BMI 43.26 kg/m    Exam:  GENERAL APPEARANCE:  Alert, in no distress  RESP:  respiratory effort normal  CV:  edema HAYDER has wraps on.   M/S:  Gait and station - in recliner.    SKIN:  Inspection and palpation of skin and subcutaneous tissue at baseline.   PSYCH:  insight and judgement, memory seems intact, affect and mood normal    ASSESSMENT/PLAN:  Pressure injury of skin of buttock, unspecified injury stage, unspecified laterality  Sitting in chair.   - continue nursing for wound care.   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   - no changes  - continue QID blood sugar checks      Lymphedema  Improving. Continue Ocupational Therapy      Venous stasis ulcers of both lower extremities (H)  Continue current cares and lymph therapy.      Paroxysmal atrial fibrillation (H)  Rate controlled. On warfarin for stroke prevention goal INR 2 - 3 .      Infection due to 2019 novel coronavirus  Continues without symptoms.     Orders:  - inr 3.91. new orders given. Recheck in 1 week.       Electronically signed by: Vickie Ventura NP    "

## 2023-06-11 NOTE — PROGRESS NOTES
[facility] GERIATRICS  [place] Medical Record Number: [id number]  Chief Complaint   Patient presents with     RECHECK     HPI: Linda Loredo is a 78 year old (1945), who is being seen today for an episodic care visit at: [facility] (TCU/SNF) [[id number]]. Today's concern is:   Copied forward from previous note: admitted to the above facility from  [facility]. Hospital stay 5/26/23 through 5/31/23.  Patient's living condition: lives alone  Brief Summary of Hospital Course: treated for sepsis and worsening pressure injury and cellulitis of buttock. PMH of venous stasis ulcers on legs, morbid obesity, COPD, A fib on warfarin, BERLIN, DM2.   MEDICATION CHANGES: start acet PRN, famotidine, fluconazole x 3 days, hydromorphone 4 mg q 6 hrs prn prior to dressing changes. Miconazole pwder, zofran, bactrim x 9 days. Changed insulin aspart. STOP - lidocaine patch, calmoseptine, oxybutynin, pregabalin.   RECOMMENDED FOLLOW UP: Wound care [facility] wound [place].   Updates on Status Since Skilled nursing Admission: 6/1 NP visit.   6/5 - decrease famotidine to 20 mg po every day  - admit orders state to administer fluconazole q 72 hours x 3 doses  - INR 2.92 warfarin 1.25 mg po on monday and Wednesday. 2.5 mg on Tuesday.   6/8 --INR 3.91 Warfarin dose adjusted  6/12-INR 4.27 Warfarin dose adjusted    Today:  Patient offers no new concerns  Antibiotics and antifungal courses completed. Patient states her stools are loose, staff have not witnessed. Denies any foul odor. Urinary catheter in place to protect perineal tissue until wound healed. Due to be changed today for q 2 week change  Recent [facility] 27/30  Routine COVID screening last week positive.  Patient without symptoms.  Now off quarantine       Allergies and PMH/PSH reviewed in EPIC today.    REVIEW OF SYSTEMS:  4 point ROS including Respiratory, CV, GI and , other than that noted in the HPI,  is  "negative    Objective:   /76   Pulse 102   Temp 98.6  F (37  C)   Resp 16   Ht 1.676 m (5' 6\")   Wt 111.5 kg (245 lb 12.8 oz)   SpO2 99%   BMI 39.67 kg/m    Exam:  GENERAL APPEARANCE:  Alert, in no distress, cooperative, sitting comfortably in WC  RESP:  lungs clear to auscultation , no respiratory distress  CV:  regular rate and rhythm, no murmur, rub, or gallop, LE wraps on  M/S:   weakness, WC  SKIN:  Unable to view wounds.  See photo from facility  PSYCH:  normal insight, judgement and memory, affect and mood normal         Labs:   Recent labs in [facility] reviewed by me today.  and   Most Recent 3 CBC's:  Recent Labs   Lab Test 05/31/23  0530 05/30/23  0519 05/29/23  0518   WBC 7.4 7.1 6.6   HGB 10.6* 10.5* 10.7*   MCV 94 95 94   * 126* 128*     Most Recent 3 BMP's:  Recent Labs   Lab Test 06/05/23  0715 05/31/23  1152 05/31/23  0747 05/31/23  0530 05/30/23  0751 05/30/23  0519     --   --  138  --  140   POTASSIUM 4.4  --   --  4.5  --  4.2   CHLORIDE 99  --   --  102  --  104   CO2 26  --   --  29  --  28   BUN 22.9  --   --  21.1  --  22.1   CR 1.40*  --   --  1.16*  --  1.07*   ANIONGAP 13  --   --  7  --  8   ZAKIA 11.3*  --   --  10.0  --  9.6   * 159* 151* 167*   < > 192*    < > = values in this interval not displayed.     Most Recent 3 INR's:  Recent Labs   Lab Test 06/12/23  0702 06/08/23  0751 06/05/23 0715   INR 4.27* 3.91* 2.92*     Most Recent ESR & CRP:  Recent Labs   Lab Test 06/05/23 0715   CRPI 13.80*       Assessment/Plan:  (L89.212) Pressure injury of right thigh, stage 2 (H)  (primary encounter diagnosis)  Per facility records and photo, wound is healing.  No s/s complications  Continue current wound care  Monitor for infection or other complications    (E11.621,  L97.509,  Z79.4) Type 2 diabetes mellitus with foot ulcer, with long-term current use of insulin (H)  A1c 5/27/23 8.2, 2/2/23 8.2 10/27/22 7.6  No hypoglycemic episodes  Using sliding scale insulin 1-2 " units with meals   Follows with endocrinology, using insulin pump  Continue with current plan  BS:      (I89.0) Lymphedema  (I83.019,  I83.029,  L97.919,  L97.929) Venous stasis ulcers of both lower extremities (H)  Wearing compression garments  Improving   Continue therapy    (G89.4) Chronic pain syndrome  (F11.20) Opioid dependence, uncomplicated (H)  Continues on Methadone 10mg at 3pm, 5 mg TID at 8, noon, 9pm.   Has PRN Dilaudid available, has not required any in past 7 days.  Less pain with wound care  Continue with current regime.  Continue to monitor and adjust as indicated    (I48.0) Paroxysmal atrial fibrillation (H)  Rate controlled  Anticoagulated with Warfarin.    Monitor INRs and adjust as indicated    (U07.1) Infection due to 2019 novel coronavirus  Resolving.  Out of quarantine    MED REC REQUIRED  Post Medication Reconciliation Status: patient was not discharged from an inpatient facility or TCU.  Meds reconciled    Orders:  NNO    Electronically signed by: MOE Fletcher CNP

## 2023-06-12 ENCOUNTER — TRANSITIONAL CARE UNIT VISIT (OUTPATIENT)
Dept: GERIATRICS | Facility: CLINIC | Age: 78
End: 2023-06-12
Payer: COMMERCIAL

## 2023-06-12 VITALS
HEIGHT: 66 IN | SYSTOLIC BLOOD PRESSURE: 115 MMHG | DIASTOLIC BLOOD PRESSURE: 66 MMHG | TEMPERATURE: 97.5 F | BODY MASS INDEX: 43.07 KG/M2 | HEART RATE: 86 BPM | OXYGEN SATURATION: 93 % | RESPIRATION RATE: 16 BRPM | WEIGHT: 268 LBS

## 2023-06-12 DIAGNOSIS — I48.0 PAROXYSMAL ATRIAL FIBRILLATION (H): Primary | ICD-10-CM

## 2023-06-12 DIAGNOSIS — Z79.01 LONG TERM (CURRENT) USE OF ANTICOAGULANTS: ICD-10-CM

## 2023-06-12 DIAGNOSIS — Z51.81 ENCOUNTER FOR THERAPEUTIC DRUG MONITORING: ICD-10-CM

## 2023-06-12 LAB — INR PPP: 4.27 (ref 0.85–1.15)

## 2023-06-12 PROCEDURE — 85610 PROTHROMBIN TIME: CPT | Mod: ORL | Performed by: FAMILY MEDICINE

## 2023-06-12 PROCEDURE — 99308 SBSQ NF CARE LOW MDM 20: CPT | Performed by: NURSE PRACTITIONER

## 2023-06-12 PROCEDURE — 36415 COLL VENOUS BLD VENIPUNCTURE: CPT | Mod: ORL | Performed by: FAMILY MEDICINE

## 2023-06-12 PROCEDURE — P9603 ONE-WAY ALLOW PRORATED MILES: HCPCS | Mod: ORL | Performed by: FAMILY MEDICINE

## 2023-06-12 NOTE — PROGRESS NOTES
"[facility] GERIATRICS  [place] Medical Record Number: [id number]  Place of Service where encounter took place: [facility] (U/SNF) [4000]    HPI:    Linda Loredo is a 78 year old (1945), who is being seen today for an episodic care visit at the above location. Today's concern is INR/Coumadin management for ELIE. Fib    ROS/Subjective:  Bleeding Signs/Symptoms: None  Thromboembolic Signs/Symptoms: None  Medication Changes: No  Dietary Changes: No  Activity Changes: No  Bacterial/Viral Infection: Yes: newly diagnosed with COVID  Missed Coumadin Doses: None  On ASA: No  Other Concerns: No    OBJECTIVE:  /66   Pulse 86   Temp 97.5  F (36.4  C)   Resp 16   Ht 1.676 m (5' 6\")   Wt 121.6 kg (268 lb)   SpO2 93%   BMI 43.26 kg/m    Last INR: 3.91 on 6/8  INR Today: 4.27  Current Dose: 1.25mg M, W, F; 2.5 mg T, TH, Sa, Zacarias      ASSESSMENT:  (I48.0) Paroxysmal atrial fibrillation (H)  (primary encounter diagnosis)  (Z51.81) Encounter for therapeutic drug monitoring  (Z79.01) Long term (current) use of anticoagulants  Supratherapeutic INR for goal of 2-3    PLAN/ORDERS:     New Dose: hold today then 2.5mg M, W, F; 1.25mg T, TH, Sa, Zacarias    Next INR: 6/16      Electronically signed by: MOE Fletcher CNP  "

## 2023-06-12 NOTE — LETTER
"    6/12/2023        RE: Linda Loredo  [address] [address]        [facility]  [place] Medical Record Number: [id number]  Place of Service where encounter took place: [facility] ([facility]/SNF) [4000]    HPI:    Linda Loredo is a 78 year old (1945), who is being seen today for an episodic care visit at the above location. Today's concern is INR/Coumadin management for ELIE. Fib    ROS/Subjective:  Bleeding Signs/Symptoms: None  Thromboembolic Signs/Symptoms: None  Medication Changes: No  Dietary Changes: No  Activity Changes: No  Bacterial/Viral Infection: Yes: newly diagnosed with COVID  Missed Coumadin Doses: None  On ASA: No  Other Concerns: No    OBJECTIVE:  /66   Pulse 86   Temp 97.5  F (36.4  C)   Resp 16   Ht 1.676 m (5' 6\")   Wt 121.6 kg (268 lb)   SpO2 93%   BMI 43.26 kg/m    Last INR: 3.91 on 6/8  INR Today: 4.27  Current Dose: 1.25mg M, W, F; 2.5 mg T, TH, Sa, Zacarias      ASSESSMENT:  (I48.0) Paroxysmal atrial fibrillation (H)  (primary encounter diagnosis)  (Z51.81) Encounter for therapeutic drug monitoring  (Z79.01) Long term (current) use of anticoagulants  Supratherapeutic INR for goal of 2-3    PLAN/ORDERS:     New Dose: hold today then 2.5mg M, W, F; 1.25mg T, TH, Sa, Zacarias    Next INR: 6/16      Electronically signed by: MOE Fletcher CNP      Sincerely,        MOE Fletcher CNP      "

## 2023-06-13 ENCOUNTER — TRANSITIONAL CARE UNIT VISIT (OUTPATIENT)
Dept: GERIATRICS | Facility: CLINIC | Age: 78
End: 2023-06-13
Payer: COMMERCIAL

## 2023-06-13 ENCOUNTER — TELEPHONE (OUTPATIENT)
Dept: GERIATRICS | Facility: CLINIC | Age: 78
End: 2023-06-13

## 2023-06-13 VITALS
OXYGEN SATURATION: 99 % | WEIGHT: 245.8 LBS | SYSTOLIC BLOOD PRESSURE: 127 MMHG | RESPIRATION RATE: 16 BRPM | HEIGHT: 66 IN | BODY MASS INDEX: 39.5 KG/M2 | TEMPERATURE: 98.6 F | HEART RATE: 102 BPM | DIASTOLIC BLOOD PRESSURE: 76 MMHG

## 2023-06-13 DIAGNOSIS — L97.919 VENOUS STASIS ULCERS OF BOTH LOWER EXTREMITIES (H): ICD-10-CM

## 2023-06-13 DIAGNOSIS — I83.029 VENOUS STASIS ULCERS OF BOTH LOWER EXTREMITIES (H): ICD-10-CM

## 2023-06-13 DIAGNOSIS — L89.212 PRESSURE INJURY OF RIGHT THIGH, STAGE 2 (H): Primary | ICD-10-CM

## 2023-06-13 DIAGNOSIS — I83.019 VENOUS STASIS ULCERS OF BOTH LOWER EXTREMITIES (H): ICD-10-CM

## 2023-06-13 DIAGNOSIS — L97.509 TYPE 2 DIABETES MELLITUS WITH FOOT ULCER, WITH LONG-TERM CURRENT USE OF INSULIN (H): ICD-10-CM

## 2023-06-13 DIAGNOSIS — I48.0 PAROXYSMAL ATRIAL FIBRILLATION (H): ICD-10-CM

## 2023-06-13 DIAGNOSIS — Z79.4 TYPE 2 DIABETES MELLITUS WITH FOOT ULCER, WITH LONG-TERM CURRENT USE OF INSULIN (H): ICD-10-CM

## 2023-06-13 DIAGNOSIS — E11.621 TYPE 2 DIABETES MELLITUS WITH FOOT ULCER, WITH LONG-TERM CURRENT USE OF INSULIN (H): ICD-10-CM

## 2023-06-13 DIAGNOSIS — I89.0 LYMPHEDEMA: ICD-10-CM

## 2023-06-13 DIAGNOSIS — L97.929 VENOUS STASIS ULCERS OF BOTH LOWER EXTREMITIES (H): ICD-10-CM

## 2023-06-13 DIAGNOSIS — U07.1 INFECTION DUE TO 2019 NOVEL CORONAVIRUS: ICD-10-CM

## 2023-06-13 DIAGNOSIS — G89.4 CHRONIC PAIN SYNDROME: ICD-10-CM

## 2023-06-13 DIAGNOSIS — F11.20 OPIOID DEPENDENCE, UNCOMPLICATED (H): ICD-10-CM

## 2023-06-13 PROCEDURE — 99309 SBSQ NF CARE MODERATE MDM 30: CPT | Performed by: NURSE PRACTITIONER

## 2023-06-13 RX ORDER — FAMOTIDINE 20 MG/1
20 TABLET, FILM COATED ORAL DAILY
Status: ON HOLD | COMMUNITY
End: 2023-09-23

## 2023-06-13 NOTE — TELEPHONE ENCOUNTER
[facility] [place] [facility] Triage Nurse Telephone Encounter    Provider: Naomi Armenta CNP  Facility: [place]  Facility Type:  TCU    Caller: Misti  Call Back Number: [phone]    Allergies:    Allergies   Allergen Reactions     Prednisone Nausea and Vomiting     Morphine Nausea and Vomiting     Morphine [Fumaric Acid] Nausea and Vomiting     Seasonal Allergies Difficulty breathing        Reason for call: Nurse is calling to report that patient stated she had watery stools x 3 today.  No fever noted.  Patient denies abdominal pain.  Of note, she completed IV antibiotics recently and also completed Bactrim as of this morning.      Verbal Order/Direction given by Provider: Obtain a stool sample to check for C-diff.  Encourage fluids.      Provider giving Order:  Naomi Armenta CNP    Verbal Order given to: Misti Martinez RN

## 2023-06-13 NOTE — LETTER
6/13/2023        RE: Linda Loredo  [address] [address] [address]        [facility] GERIATRICS  [place] Medical Record Number: [id number]  Chief Complaint   Patient presents with     RECHECK     HPI: Linda Loredo is a 78 year old (1945), who is being seen today for an episodic care visit at: [facility] (TCU/SNF) [4000]. Today's concern is:   Copied forward from previous note: admitted to the above facility from  [facility]. Hospital stay 5/26/23 through 5/31/23.  Patient's living condition: lives alone  Brief Summary of Hospital Course: treated for sepsis and worsening pressure injury and cellulitis of buttock. PMH of venous stasis ulcers on legs, morbid obesity, COPD, A fib on warfarin, BERLIN, DM2.   MEDICATION CHANGES: start acet PRN, famotidine, fluconazole x 3 days, hydromorphone 4 mg q 6 hrs prn prior to dressing changes. Miconazole pwder, zofran, bactrim x 9 days. Changed insulin aspart. STOP - lidocaine patch, calmoseptine, oxybutynin, pregabalin.   RECOMMENDED FOLLOW UP: Wound care [facility] wound [place].   Updates on Status Since Skilled nursing Admission: 6/1 NP visit.   6/5 - decrease famotidine to 20 mg po every day  - admit orders state to administer fluconazole q 72 hours x 3 doses  - INR 2.92 warfarin 1.25 mg po on monday and Wednesday. 2.5 mg on Tuesday.   6/8 --INR 3.91 Warfarin dose adjusted  6/12-INR 4.27 Warfarin dose adjusted    Today:  Patient offers no new concerns  Antibiotics and antifungal courses completed. Patient states her stools are loose, staff have not witnessed. Denies any foul odor. Urinary catheter in place to protect perineal tissue until wound healed. Due to be changed today for q 2 week change  Recent [facility] 27/30  Routine COVID screening last week positive.  Patient without symptoms.  Now off quarantine       Allergies and PMH/PSH reviewed in EPIC today.    REVIEW OF  "SYSTEMS:  4 point ROS including Respiratory, CV, GI and , other than that noted in the HPI,  is negative    Objective:   /76   Pulse 102   Temp 98.6  F (37  C)   Resp 16   Ht 1.676 m (5' 6\")   Wt 111.5 kg (245 lb 12.8 oz)   SpO2 99%   BMI 39.67 kg/m    Exam:  GENERAL APPEARANCE:  Alert, in no distress, cooperative, sitting comfortably in WC  RESP:  lungs clear to auscultation , no respiratory distress  CV:  regular rate and rhythm, no murmur, rub, or gallop, LE wraps on  M/S:   weakness, WC  SKIN:  Unable to view wounds.  See photo from facility  PSYCH:  normal insight, judgement and memory, affect and mood normal         Labs:   Recent labs in [facility] reviewed by me today.  and   Most Recent 3 CBC's:  Recent Labs   Lab Test 05/31/23  0530 05/30/23  0519 05/29/23  0518   WBC 7.4 7.1 6.6   HGB 10.6* 10.5* 10.7*   MCV 94 95 94   * 126* 128*     Most Recent 3 BMP's:  Recent Labs   Lab Test 06/05/23  0715 05/31/23  1152 05/31/23  0747 05/31/23  0530 05/30/23  0751 05/30/23  0519     --   --  138  --  140   POTASSIUM 4.4  --   --  4.5  --  4.2   CHLORIDE 99  --   --  102  --  104   CO2 26  --   --  29  --  28   BUN 22.9  --   --  21.1  --  22.1   CR 1.40*  --   --  1.16*  --  1.07*   ANIONGAP 13  --   --  7  --  8   ZAKIA 11.3*  --   --  10.0  --  9.6   * 159* 151* 167*   < > 192*    < > = values in this interval not displayed.     Most Recent 3 INR's:  Recent Labs   Lab Test 06/12/23  0702 06/08/23  0751 06/05/23 0715   INR 4.27* 3.91* 2.92*     Most Recent ESR & CRP:  Recent Labs   Lab Test 06/05/23 0715   CRPI 13.80*       Assessment/Plan:  (L89.212) Pressure injury of right thigh, stage 2 (H)  (primary encounter diagnosis)  Per facility records and photo, wound is healing.  No s/s complications  Continue current wound care  Monitor for infection or other complications    (E11.621,  L97.509,  Z79.4) Type 2 diabetes mellitus with foot ulcer, with long-term current use of insulin (H)  A1c " 5/27/23 8.2, 2/2/23 8.2 10/27/22 7.6  No hypoglycemic episodes  Using sliding scale insulin 1-2 units with meals   Follows with endocrinology, using insulin pump  Continue with current plan  BS:      (I89.0) Lymphedema  (I83.019,  I83.029,  L97.919,  L97.929) Venous stasis ulcers of both lower extremities (H)  Wearing compression garments  Improving   Continue therapy    (G89.4) Chronic pain syndrome  (F11.20) Opioid dependence, uncomplicated (H)  Continues on Methadone 10mg at 3pm, 5 mg TID at 8, noon, 9pm.   Has PRN Dilaudid available, has not required any in past 7 days.  Less pain with wound care  Continue with current regime.  Continue to monitor and adjust as indicated    (I48.0) Paroxysmal atrial fibrillation (H)  Rate controlled  Anticoagulated with Warfarin.    Monitor INRs and adjust as indicated    (U07.1) Infection due to 2019 novel coronavirus  Resolving.  Out of quarantine    MED REC REQUIRED  Post Medication Reconciliation Status: patient was not discharged from an inpatient facility or TCU.  Meds reconciled    Orders:  NNO    Electronically signed by: MOE Fletcher CNP      Sincerely,        MOE Fletcher CNP

## 2023-06-14 ENCOUNTER — LAB REQUISITION (OUTPATIENT)
Dept: LAB | Facility: CLINIC | Age: 78
End: 2023-06-14
Payer: COMMERCIAL

## 2023-06-14 DIAGNOSIS — I48.91 UNSPECIFIED ATRIAL FIBRILLATION (H): ICD-10-CM

## 2023-06-15 NOTE — PROGRESS NOTES
"[facility] GERIATRICS  [place] Medical Record Number: [id number]  Place of Service where encounter took place: [facility] (U/SNF) [4000]    HPI:    Linda Loredo is a 78 year old (1945), who is being seen today for an episodic care visit at the above location. Today's concern is INR/Coumadin management for ELIE. Fib    ROS/Subjective:  Bleeding Signs/Symptoms: None  Thromboembolic Signs/Symptoms: None  Medication Changes: No  Dietary Changes: No  Activity Changes: No  Bacterial/Viral Infection: No  Missed Coumadin Doses: None  On ASA: No  Other Concerns: No    OBJECTIVE:  /76   Pulse 102   Temp 98.6  F (37  C)   Resp 16   Ht 1.676 m (5' 6\")   Wt 111.4 kg (245 lb 8 oz)   SpO2 99%   BMI 39.62 kg/m    Last INR: 4.27 on 6/12  INR Today: 3.18  Current Dose: 2.5mg M, W, R, 1.25mg AOD      ASSESSMENT:  (I48.0) Paroxysmal atrial fibrillation (H)  (primary encounter diagnosis)  (Z51.81) Encounter for therapeutic drug monitoring  (Z79.01) Long term (current) use of anticoagulants  Supratherapeutic INR for goal of 2-3    PLAN/ORDERS:     New Dose: 1.25mg daily x 3 days    Next INR: 6/19      Electronically signed by: MOE Fletcher CNP  "

## 2023-06-16 ENCOUNTER — TRANSITIONAL CARE UNIT VISIT (OUTPATIENT)
Dept: GERIATRICS | Facility: CLINIC | Age: 78
End: 2023-06-16
Payer: COMMERCIAL

## 2023-06-16 VITALS
OXYGEN SATURATION: 99 % | TEMPERATURE: 98.6 F | HEART RATE: 102 BPM | WEIGHT: 245.5 LBS | HEIGHT: 66 IN | DIASTOLIC BLOOD PRESSURE: 76 MMHG | BODY MASS INDEX: 39.46 KG/M2 | SYSTOLIC BLOOD PRESSURE: 127 MMHG | RESPIRATION RATE: 16 BRPM

## 2023-06-16 DIAGNOSIS — Z79.01 LONG TERM (CURRENT) USE OF ANTICOAGULANTS: ICD-10-CM

## 2023-06-16 DIAGNOSIS — Z51.81 ENCOUNTER FOR THERAPEUTIC DRUG MONITORING: ICD-10-CM

## 2023-06-16 DIAGNOSIS — I48.0 PAROXYSMAL ATRIAL FIBRILLATION (H): Primary | ICD-10-CM

## 2023-06-16 LAB — INR PPP: 3.18 (ref 0.85–1.15)

## 2023-06-16 PROCEDURE — P9604 ONE-WAY ALLOW PRORATED TRIP: HCPCS | Mod: ORL | Performed by: FAMILY MEDICINE

## 2023-06-16 PROCEDURE — 36415 COLL VENOUS BLD VENIPUNCTURE: CPT | Mod: ORL | Performed by: FAMILY MEDICINE

## 2023-06-16 PROCEDURE — 85610 PROTHROMBIN TIME: CPT | Mod: ORL | Performed by: FAMILY MEDICINE

## 2023-06-16 PROCEDURE — 99308 SBSQ NF CARE LOW MDM 20: CPT | Performed by: NURSE PRACTITIONER

## 2023-06-16 NOTE — LETTER
"    6/16/2023        RE: Linda Loredo  [address] [address] [address]        [facility]  [place] Medical Record Number: [id number]  Place of Service where encounter took place: [facility] ([facility]/SNF) [4000]    HPI:    Linda Loredo is a 78 year old (1945), who is being seen today for an episodic care visit at the above location. Today's concern is INR/Coumadin management for ELIE. Fib    ROS/Subjective:  Bleeding Signs/Symptoms: None  Thromboembolic Signs/Symptoms: None  Medication Changes: No  Dietary Changes: No  Activity Changes: No  Bacterial/Viral Infection: No  Missed Coumadin Doses: None  On ASA: No  Other Concerns: No    OBJECTIVE:  /76   Pulse 102   Temp 98.6  F (37  C)   Resp 16   Ht 1.676 m (5' 6\")   Wt 111.4 kg (245 lb 8 oz)   SpO2 99%   BMI 39.62 kg/m    Last INR: 4.27 on 6/12  INR Today: 3.18  Current Dose: 2.5mg M, W, R, 1.25mg AOD      ASSESSMENT:  (I48.0) Paroxysmal atrial fibrillation (H)  (primary encounter diagnosis)  (Z51.81) Encounter for therapeutic drug monitoring  (Z79.01) Long term (current) use of anticoagulants  Supratherapeutic INR for goal of 2-3    PLAN/ORDERS:     New Dose: 1.25mg daily x 3 days    Next INR: 6/19      Electronically signed by: MOE Fletcher CNP      Sincerely,        MOE Fletcher CNP      "

## 2023-06-17 ENCOUNTER — LAB REQUISITION (OUTPATIENT)
Dept: LAB | Facility: CLINIC | Age: 78
End: 2023-06-17
Payer: COMMERCIAL

## 2023-06-17 DIAGNOSIS — I48.0 PAROXYSMAL ATRIAL FIBRILLATION (H): ICD-10-CM

## 2023-06-19 ENCOUNTER — LAB REQUISITION (OUTPATIENT)
Dept: LAB | Facility: CLINIC | Age: 78
End: 2023-06-19
Payer: COMMERCIAL

## 2023-06-19 ENCOUNTER — TRANSITIONAL CARE UNIT VISIT (OUTPATIENT)
Dept: GERIATRICS | Facility: CLINIC | Age: 78
End: 2023-06-19
Payer: COMMERCIAL

## 2023-06-19 ENCOUNTER — TELEPHONE (OUTPATIENT)
Dept: GERIATRICS | Facility: CLINIC | Age: 78
End: 2023-06-19

## 2023-06-19 VITALS
BODY MASS INDEX: 39.46 KG/M2 | OXYGEN SATURATION: 97 % | TEMPERATURE: 97.9 F | RESPIRATION RATE: 20 BRPM | WEIGHT: 245.5 LBS | DIASTOLIC BLOOD PRESSURE: 66 MMHG | HEART RATE: 78 BPM | SYSTOLIC BLOOD PRESSURE: 119 MMHG | HEIGHT: 66 IN

## 2023-06-19 DIAGNOSIS — Z79.4 TYPE 2 DIABETES MELLITUS WITH FOOT ULCER, WITH LONG-TERM CURRENT USE OF INSULIN (H): ICD-10-CM

## 2023-06-19 DIAGNOSIS — L97.919 VENOUS STASIS ULCERS OF BOTH LOWER EXTREMITIES (H): ICD-10-CM

## 2023-06-19 DIAGNOSIS — L97.929 VENOUS STASIS ULCERS OF BOTH LOWER EXTREMITIES (H): ICD-10-CM

## 2023-06-19 DIAGNOSIS — I48.91 UNSPECIFIED ATRIAL FIBRILLATION (H): ICD-10-CM

## 2023-06-19 DIAGNOSIS — L89.212 PRESSURE INJURY OF RIGHT THIGH, STAGE 2 (H): Primary | ICD-10-CM

## 2023-06-19 DIAGNOSIS — E11.621 TYPE 2 DIABETES MELLITUS WITH FOOT ULCER, WITH LONG-TERM CURRENT USE OF INSULIN (H): ICD-10-CM

## 2023-06-19 DIAGNOSIS — I48.0 PAROXYSMAL ATRIAL FIBRILLATION (H): ICD-10-CM

## 2023-06-19 DIAGNOSIS — I89.0 LYMPHEDEMA: ICD-10-CM

## 2023-06-19 DIAGNOSIS — I83.019 VENOUS STASIS ULCERS OF BOTH LOWER EXTREMITIES (H): ICD-10-CM

## 2023-06-19 DIAGNOSIS — I83.029 VENOUS STASIS ULCERS OF BOTH LOWER EXTREMITIES (H): ICD-10-CM

## 2023-06-19 DIAGNOSIS — L97.509 TYPE 2 DIABETES MELLITUS WITH FOOT ULCER, WITH LONG-TERM CURRENT USE OF INSULIN (H): ICD-10-CM

## 2023-06-19 PROCEDURE — 99309 SBSQ NF CARE MODERATE MDM 30: CPT | Performed by: NURSE PRACTITIONER

## 2023-06-19 PROCEDURE — P9603 ONE-WAY ALLOW PRORATED MILES: HCPCS | Mod: ORL | Performed by: FAMILY MEDICINE

## 2023-06-19 NOTE — PROGRESS NOTES
[facility] GERIATRICS  TCU FOLLOW UP VISIT    Chief Complaint   Patient presents with     RECHECK      Place of Service where encounter took place:  [facility] (TCU/SNF) [4000]    Linda Loredo  is a 78 year old  (1945), who is being seen today at the above facility to discuss progress in therapy, review nursing home EHR, recheck chronic medical problems as well as address any new concerns.       HPI:    Copied forward from previous note: admitted to the above facility from  [facility]. Hospital stay 5/26/23 through 5/31/23.  Patient's living condition: lives alone  Brief Summary of Hospital Course: treated for sepsis and worsening pressure injury and cellulitis of buttock. PMH of venous stasis ulcers on legs, morbid obesity, COPD, A fib on warfarin, BERLIN, DM2.   MEDICATION CHANGES: start acet PRN, famotidine, fluconazole x 3 days, hydromorphone 4 mg q 6 hrs prn prior to dressing changes. Miconazole pwder, zofran, bactrim x 9 days. Changed insulin aspart. STOP - lidocaine patch, calmoseptine, oxybutynin, pregabalin.   RECOMMENDED FOLLOW UP: Wound care [facility] wound [place].   Updates on Status Since Skilled nursing Admission: 6/1 NP visit.   6/5 - decrease famotidine to 20 mg po every day  - admit orders state to administer fluconazole q 72 hours x 3 doses  - INR 2.92 warfarin 1.25 mg po on monday and Wednesday. 2.5 mg on Tuesday.   - recheck inr on Thursday.   6/13 check stool for C. Difficile (was never done)    Today: Nursing reports wound is healing.  Decreasing in size.  Physical therapy reports last covered day is 6/23.  Discharge date dependent on wound care.  We will attempt catheter removal.  Patient is looking forward to this.  Explained the process for removal and possible need for replacement.  Facility EHR reviewed including BM record, vital signs, progress notes, MAR.  No concerns.    ALLERGIES: Prednisone, Morphine, Morphine [fumaric  "acid], and Seasonal allergies    ROS:  4 point ROS including Respiratory, CV, GI and , other than that noted in the HPI,  is negative    Vitals:  /66   Pulse 78   Temp 97.9  F (36.6  C)   Resp 20   Ht 1.676 m (5' 6\")   Wt 111.4 kg (245 lb 8 oz)   SpO2 97%   BMI 39.62 kg/m    Exam:  GENERAL APPEARANCE:  Alert, in no distress  RESP:  respiratory effort normal  CV:  edema HAYDER has wraps on.   M/S:  Gait and station - in recliner.    SKIN:  Inspection and palpation of skin and subcutaneous tissue at baseline.   PSYCH:  insight and judgement, memory seems intact, affect and mood normal    ASSESSMENT/PLAN:  Pressure injury of skin of buttock, unspecified injury stage, unspecified laterality  Improving.  - continue nursing for wound care.   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   - no changes  - continue QID blood sugar checks      Lymphedema  Improving. Continue Ocupational Therapy.  Weight is down which is to be expected with the fluid losses.      Venous stasis ulcers of both lower extremities (H)  Continue current cares and lymph therapy.      Paroxysmal atrial fibrillation (H)  Rate controlled. On warfarin for stroke prevention goal INR 2 - 3.  INR pending.     Braga catheter in place  Attempt removal    Orders:  - Remove Braga catheter  -Bladder scan every shift x3 days and straight cath if residual greater than 350 mill      Electronically signed by: Vickie Ventura NP    "

## 2023-06-19 NOTE — LETTER
6/19/2023        RE: Linda Loredo  [address] [address]        [facility] GERIATRICS  TCU FOLLOW UP VISIT    Chief Complaint   Patient presents with     RECHECK      Place of Service where encounter took place:  [facility] (TCU/SNF) [4000]    Linda Loredo  is a 78 year old  (1945), who is being seen today at the above facility to discuss progress in therapy, review nursing home EHR, recheck chronic medical problems as well as address any new concerns.       HPI:    Copied forward from previous note: admitted to the above facility from  [facility]. Hospital stay 5/26/23 through 5/31/23.  Patient's living condition: lives alone  Brief Summary of Hospital Course: treated for sepsis and worsening pressure injury and cellulitis of buttock. PMH of venous stasis ulcers on legs, morbid obesity, COPD, A fib on warfarin, BERLIN, DM2.   MEDICATION CHANGES: start acet PRN, famotidine, fluconazole x 3 days, hydromorphone 4 mg q 6 hrs prn prior to dressing changes. Miconazole pwder, zofran, bactrim x 9 days. Changed insulin aspart. STOP - lidocaine patch, calmoseptine, oxybutynin, pregabalin.   RECOMMENDED FOLLOW UP: Wound care [facility] wound [place].   Updates on Status Since Skilled nursing Admission: 6/1 NP visit.   6/5 - decrease famotidine to 20 mg po every day  - admit orders state to administer fluconazole q 72 hours x 3 doses  - INR 2.92 warfarin 1.25 mg po on monday and Wednesday. 2.5 mg on Tuesday.   - recheck inr on Thursday.   6/13 check stool for C. Difficile (was never done)    Today: Nursing reports wound is healing.  Decreasing in size.  Physical therapy reports last covered day is 6/23.  Discharge date dependent on wound care.  We will attempt catheter removal.  Patient is looking forward to this.  Explained the process for removal and possible need for replacement.  Facility EHR reviewed including BM  "record, vital signs, progress notes, MAR.  No concerns.    ALLERGIES: Prednisone, Morphine, Morphine [fumaric acid], and Seasonal allergies    ROS:  4 point ROS including Respiratory, CV, GI and , other than that noted in the HPI,  is negative    Vitals:  /66   Pulse 78   Temp 97.9  F (36.6  C)   Resp 20   Ht 1.676 m (5' 6\")   Wt 111.4 kg (245 lb 8 oz)   SpO2 97%   BMI 39.62 kg/m    Exam:  GENERAL APPEARANCE:  Alert, in no distress  RESP:  respiratory effort normal  CV:  edema HAYDER has wraps on.   M/S:  Gait and station - in recliner.    SKIN:  Inspection and palpation of skin and subcutaneous tissue at baseline.   PSYCH:  insight and judgement, memory seems intact, affect and mood normal    ASSESSMENT/PLAN:  Pressure injury of skin of buttock, unspecified injury stage, unspecified laterality  Improving.  - continue nursing for wound care.   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   - no changes  - continue QID blood sugar checks      Lymphedema  Improving. Continue Ocupational Therapy.  Weight is down which is to be expected with the fluid losses.      Venous stasis ulcers of both lower extremities (H)  Continue current cares and lymph therapy.      Paroxysmal atrial fibrillation (H)  Rate controlled. On warfarin for stroke prevention goal INR 2 - 3.  INR pending.     Braga catheter in place  Attempt removal    Orders:  - Remove Braga catheter  -Bladder scan every shift x3 days and straight cath if residual greater than 350 mill      Electronically signed by: Vickie Ventura NP          Sincerely,        Vickie Ventura NP      "

## 2023-06-19 NOTE — TELEPHONE ENCOUNTER
INR was drawn, but there was not enough blood for specimen to be run.  Patient has been taking Warfarin 1.25mg daily for the last 3 days.  Writer instructed the nurse to give another 1.25mg of Warfarin today and to recheck INR tomorrow.        Nolberto Martinez RN

## 2023-06-20 ENCOUNTER — TELEPHONE (OUTPATIENT)
Dept: GERIATRICS | Facility: CLINIC | Age: 78
End: 2023-06-20

## 2023-06-20 LAB — INR PPP: 2.7 (ref 0.85–1.15)

## 2023-06-20 PROCEDURE — 85610 PROTHROMBIN TIME: CPT | Mod: ORL | Performed by: FAMILY MEDICINE

## 2023-06-20 PROCEDURE — 36415 COLL VENOUS BLD VENIPUNCTURE: CPT | Mod: ORL | Performed by: FAMILY MEDICINE

## 2023-06-20 PROCEDURE — P9603 ONE-WAY ALLOW PRORATED MILES: HCPCS | Mod: ORL | Performed by: FAMILY MEDICINE

## 2023-06-20 NOTE — TELEPHONE ENCOUNTER
[facility] Triage Nurse INR     Provider: Vickie Ventura NP  Facility: [place]   Facility Type:  TCU    Caller: Rickey  Call Back Number: [phone]  Reason for call: INR  Diagnosis/Goal: A. Fib    Todays INR: 2.70  Last INR 3.18 (6/16), 1.25 mg po daily.    Heparin/Lovenox:  No  Currently on ABX?: No  Other interacting medication:  None  Missed or refused doses: No    Verbal Order/Direction given by Provider: Continue same Coumadin dose of 1.25 mg po daily and recheck INR on 6/26.    Provider Giving Order:  Vickie Ventura NP    Verbal Order given to: Rickey Cheatham RN

## 2023-06-21 ENCOUNTER — LAB REQUISITION (OUTPATIENT)
Dept: LAB | Facility: CLINIC | Age: 78
End: 2023-06-21
Payer: COMMERCIAL

## 2023-06-21 DIAGNOSIS — I48.91 UNSPECIFIED ATRIAL FIBRILLATION (H): ICD-10-CM

## 2023-06-22 ENCOUNTER — TRANSITIONAL CARE UNIT VISIT (OUTPATIENT)
Dept: GERIATRICS | Facility: CLINIC | Age: 78
End: 2023-06-22
Payer: COMMERCIAL

## 2023-06-22 VITALS
SYSTOLIC BLOOD PRESSURE: 94 MMHG | TEMPERATURE: 98.6 F | RESPIRATION RATE: 18 BRPM | HEIGHT: 66 IN | WEIGHT: 245.5 LBS | OXYGEN SATURATION: 96 % | DIASTOLIC BLOOD PRESSURE: 54 MMHG | HEART RATE: 80 BPM | BODY MASS INDEX: 39.46 KG/M2

## 2023-06-22 DIAGNOSIS — I83.019 VENOUS STASIS ULCERS OF BOTH LOWER EXTREMITIES (H): ICD-10-CM

## 2023-06-22 DIAGNOSIS — Z79.4 TYPE 2 DIABETES MELLITUS WITH FOOT ULCER, WITH LONG-TERM CURRENT USE OF INSULIN (H): ICD-10-CM

## 2023-06-22 DIAGNOSIS — I83.029 VENOUS STASIS ULCERS OF BOTH LOWER EXTREMITIES (H): ICD-10-CM

## 2023-06-22 DIAGNOSIS — E11.621 TYPE 2 DIABETES MELLITUS WITH FOOT ULCER, WITH LONG-TERM CURRENT USE OF INSULIN (H): ICD-10-CM

## 2023-06-22 DIAGNOSIS — L97.929 VENOUS STASIS ULCERS OF BOTH LOWER EXTREMITIES (H): ICD-10-CM

## 2023-06-22 DIAGNOSIS — L97.509 TYPE 2 DIABETES MELLITUS WITH FOOT ULCER, WITH LONG-TERM CURRENT USE OF INSULIN (H): ICD-10-CM

## 2023-06-22 DIAGNOSIS — G89.4 CHRONIC PAIN SYNDROME: ICD-10-CM

## 2023-06-22 DIAGNOSIS — I48.0 PAROXYSMAL ATRIAL FIBRILLATION (H): ICD-10-CM

## 2023-06-22 DIAGNOSIS — I89.0 LYMPHEDEMA: ICD-10-CM

## 2023-06-22 DIAGNOSIS — L97.919 VENOUS STASIS ULCERS OF BOTH LOWER EXTREMITIES (H): ICD-10-CM

## 2023-06-22 DIAGNOSIS — L89.212 PRESSURE INJURY OF RIGHT THIGH, STAGE 2 (H): Primary | ICD-10-CM

## 2023-06-22 PROCEDURE — 99309 SBSQ NF CARE MODERATE MDM 30: CPT | Performed by: NURSE PRACTITIONER

## 2023-06-22 RX ORDER — MULTIPLE VITAMINS W/ MINERALS TAB 9MG-400MCG
1 TAB ORAL DAILY
Status: ON HOLD | COMMUNITY
End: 2023-09-23

## 2023-06-22 RX ORDER — METHADONE HYDROCHLORIDE 5 MG/1
5 TABLET ORAL 3 TIMES DAILY
Qty: 50 TABLET | Refills: 0 | Status: ON HOLD | OUTPATIENT
Start: 2023-06-22 | End: 2023-11-15

## 2023-06-22 NOTE — PROGRESS NOTES
[facility] GERIATRICS  TCU FOLLOW UP VISIT    Chief Complaint   Patient presents with     RECHECK      Place of Service where encounter took place:  [facility] (TCU/SNF) [[id number]]    Linda Loredo  is a 78 year old  (1945), who is being seen today at the above facility to discuss progress in therapy, review nursing home EHR, recheck chronic medical problems as well as address any new concerns.       HPI:    Copied forward from previous note: admitted to the above facility from  [facility]. Hospital stay 5/26/23 through 5/31/23.  Patient's living condition: lives alone  Brief Summary of Hospital Course: treated for sepsis and worsening pressure injury and cellulitis of buttock. PMH of venous stasis ulcers on legs, morbid obesity, COPD, A fib on warfarin, BERLIN, DM2.   MEDICATION CHANGES: start acet PRN, famotidine, fluconazole x 3 days, hydromorphone 4 mg q 6 hrs prn prior to dressing changes. Miconazole pwder, zofran, bactrim x 9 days. Changed insulin aspart. STOP - lidocaine patch, calmoseptine, oxybutynin, pregabalin.   RECOMMENDED FOLLOW UP: Wound care [facility] wound [place].   Updates on Status Since Skilled nursing Admission: 6/1 NP visit.   6/5 - decrease famotidine to 20 mg po every day  - admit orders state to administer fluconazole q 72 hours x 3 doses  - INR 2.92 warfarin 1.25 mg po on monday and Wednesday. 2.5 mg on Tuesday.   - recheck inr on Thursday.   6/13 check stool for C. Difficile (was never done)  6/22 Remove Braga catheter, wound is healing.     Today:  Facility EHR reviewed including BM record, vital signs, progress notes, MAR shows b/ps 87/48 - 94/54, no fevers, HR 78 - 88.  - 257. dialudid last used on 6/5. Patient reports she is going home on Saturday. She is ok with dicontinueing the dilauidid. She does need a new script of the methadone to get her through to her next apt. Nurse reports patient has more pinkness to legs  "today. And a small open area on right calf 1 cm x 0.6 cm. Patient denies increase in pain. States she doesn't think her legs are more pink. She states the area has been there for a long time. She denies fever and chills and increase in pain. I have no current concern for infection. Will have nursing monitor. I also noticed a slight increase in edema in her thighs. She doesn't think so but reports that her weight is up 3 lbs today from yesterday. She agrees to additional torsemide. Also due to lower blood pressures will decrease lisinopril today. Patient reports highes bladder scan was for 12ml.     ALLERGIES: Prednisone, Morphine, Morphine [fumaric acid], and Seasonal allergies    ROS:  4 point ROS including Respiratory, CV, GI and , other than that noted in the HPI,  is negative    Vitals:  BP 94/54   Pulse 80   Temp 98.6  F (37  C)   Resp 18   Ht 1.676 m (5' 6\")   Wt 111.4 kg (245 lb 8 oz)   SpO2 96%   BMI 39.62 kg/m    Exam:  GENERAL APPEARANCE:  Alert, in no distress  RESP:  respiratory effort normal  CV:  edema HAYDER has wraps on. Seems more in thighs. Thighs light pink. No concern for infection.   M/S:  Gait and station - in recliner.    SKIN:  Inspection and palpation of skin and subcutaneous tissue at baseline.   PSYCH:  insight and judgement, memory seems intact, affect and mood normal    ASSESSMENT/PLAN:  Pressure injury of skin of buttock, unspecified injury stage, unspecified laterality  Improving.  - continue nursing for wound care. plans to discharge this weekend and will have home care nursing.   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   - no changes  - continue QID blood sugar checks      Lymphedema  More today than last week.  Continue Ocupational Therapy. Continue with lymph wraps at discharge.  Weight increasing a " bit. Will increase torsemide.      Chronic pain - will send 30 tabs of methadone to the pharmacy so patient can get to her next apt.     Venous stasis ulcers of both lower extremities (H)  Continue current cares and lymph therapy. no current concern for infection.   - see wound care below and orders for monitor.      Paroxysmal atrial fibrillation (H)  Rate controlled. On warfarin for stroke prevention goal INR 2 - 3.     Hypertension  Lower than needed and adding a few days of extra torsemide  - decrease lisinopril.    Braga catheter in place  Braga removed and voiding without problems.       Medications reconcilled and updated in [facility]      Current Outpatient Medications   Medication Sig Dispense Refill     acetaminophen (TYLENOL) 325 MG tablet Take 2 tablets (650 mg) by mouth every 6 hours as needed for mild pain or other (and adjunct with moderate or severe pain or per patient request)       famotidine (PEPCID) 20 MG tablet Take 20 mg by mouth daily       insulin aspart (NOVOLOG PEN) 100 UNIT/ML pen Inject 1-7 Units Subcutaneous 3 times daily (before meals) Correction Scale - MEDIUM INSULIN RESISTANCE DOSING     Do Not give Correction Insulin if Pre-Meal BG less than 140.   For Pre-Meal  - 189 give 1 unit.   For Pre-Meal  - 239 give 2 units.   For Pre-Meal  - 289 give 3 units.   For Pre-Meal  - 339 give 4 units.   For Pre-Meal - 399 give 5 units.   For Pre-Meal -449 give 6 units  For Pre-Meal BG greater than or equal to 450 give 7 units.   To be given with prandial insulin, and based on pre-meal blood glucose.    Notify provider if glucose greater than or equal to 350 mg/dL after administration of correction dose.  If given at mealtime, administer within 30 minutes of start of meal. 15 mL      insulin aspart (NOVOLOG PEN) 100 UNIT/ML pen Inject 1-5 Units Subcutaneous At Bedtime MEDIUM INSULIN RESISTANCE DOSING    Do Not give Bedtime Correction Insulin if BG less than  200.    For  - 249 give 1 units.   For  - 299 give 2 units.   For  - 349 give 3 units.   For  -399 give 4 units.   For BG greater than or equal to 400 give 5 units.  Notify provider if glucose greater than or equal to 350 mg/dL after administration of correction dose.  If given at mealtime, administer within 30 minutes of start of meal. 15 mL      lisinopril (ZESTRIL) 10 MG tablet Take 5 mg by mouth daily       metFORMIN (GLUCOPHAGE XR) 500 MG 24 hr tablet Take 2,000 mg by mouth daily (with dinner)       methadone (DOLOPHINE) 10 MG tablet Take 1 tablet (10 mg) by mouth daily At 1500.  Take in addition to the 5 mg three times daily dose 30 tablet 0     methadone (DOLOPHINE) 5 MG tablet Take 1 tablet (5 mg) by mouth 3 times daily At 0800, 1200 and 2100.  Take in addition to the 10 mg tablet at 1500. 50 tablet 0     miconazole (MICATIN) 2 % external powder Apply topically 2 times daily  0     multivitamin w/minerals (THERA-VIT-M) tablet Take 1 tablet by mouth daily       naloxone (NARCAN) 4 MG/0.1ML nasal spray Spray 1 spray (4 mg) into one nostril alternating nostrils as needed for opioid reversal every 2-3 minutes until assistance arrives 0.2 mL 1     ondansetron (ZOFRAN ODT) 4 MG ODT tab Take 1 tablet (4 mg) by mouth every 6 hours as needed for nausea or vomiting       pentoxifylline ER (TRENTAL) 400 MG CR tablet Take 400 mg by mouth 3 times daily (with meals)       simvastatin (ZOCOR) 20 MG tablet Take 20 mg by mouth At Bedtime       torsemide (DEMADEX) 10 MG tablet Take 10 mg by mouth 2 times daily       Warfarin Therapy Reminder Take 1 each by mouth See Admin Instructions 6-16-23: 1.25mg daily. Recheck INR 6/19       INSULIN PUMP ACCESSORIES MISC as directed (Patient not taking: Reported on 5/26/2023) 1 one year     ONE TOUCH TEST STRIPS TEST   VI use as directed (Patient taking differently: 1 strip by In Vitro route 4 times daily ACHS) 100 prn         Orders:  - Additional torsemide 10 mg p.o.  twice daily x for 5 days  -Decrease lisinopril to 5 mg p.o. every day  -Check temp every shift x3 days update if greater than 100  -Monitor legs for increase in redness warmth and pain for 3 days  -Discontinue Dilaudid  -Right calf wound-cleanse with wound cleanser, Skin-Prep around wound, Primapore, change every other day    Electronically signed by: Vickie Ventura NP    Documentation of Face to Face and Certification for Home Health Services  I certify that services are/were furnished while this patient was under the care of a physician and that a physician or an allowed non-physician practitioner (NPP), had a face-to-face encounter that meets the physician face-to-face encounter requirements. The encounter was in whole, or in part, related to the primary reason for home health. The patient is confined to his/her home and needs intermittent skilled nursing, physical therapy, speech-language pathology, or the continued need for occupational therapy. A plan of care has been established by a physician and is periodically reviewed by a physician.  Date of Face-to-Face Encounter:  6/22/2023.    I certify that, based on my findings, the following services are medically necessary home health services: Nursing, Occupational Therapy and Physical Therapy.    My clinical findings support the need for the above skilled services because: Requires assistance of another person or specialized equipment to access medical services because patient: Range of motion limitations prevents ability to exit home safely...    Patient to re-establish plan of care with their PCP within 7-10 days after leaving the facility to reestablish care.  Medicare certified PECOS provider: Vickie Ventura NP   Date: June 22, 2023

## 2023-06-22 NOTE — LETTER
6/22/2023        RE: Linda Loredo  [address] [id number]        [facility] GERIATRICS  TCU FOLLOW UP VISIT    Chief Complaint   Patient presents with     RECHECK      Place of Service where encounter took place:  [facility] (TCU/SNF) [[id number]]    Linda Loredo  is a 78 year old  (1945), who is being seen today at the above facility to discuss progress in therapy, review nursing home EHR, recheck chronic medical problems as well as address any new concerns.       HPI:    Copied forward from previous note: admitted to the above facility from  [facility]. Hospital stay 5/26/23 through 5/31/23.  Patient's living condition: lives alone  Brief Summary of Hospital Course: treated for sepsis and worsening pressure injury and cellulitis of buttock. PMH of venous stasis ulcers on legs, morbid obesity, COPD, A fib on warfarin, [place]ERLIN, DM2.   MEDICATION CHANGES: start acet PRN, famotidine, fluconazole x 3 days, hydromorphone 4 mg q 6 hrs prn prior to dressing changes. Miconazole pwder, zofran, bactrim x 9 days. Changed insulin aspart. STOP - lidocaine patch, calmoseptine, oxybutynin, pregabalin.   RECOMMENDED FOLLOW UP: Wound care [facility] wound [place].   Updates on Status Since Skilled nursing Admission: 6/1 NP visit.   6/5 - decrease famotidine to 20 mg po every day  - admit orders state to administer fluconazole q 72 hours x 3 doses  - INR 2.92 warfarin 1.25 mg po on monday and Wednesday. 2.5 mg on Tuesday.   - recheck inr on Thursday.   6/13 check stool for C. Difficile (was never done)  6/22 Remove Braga catheter, wound is healing.     Today:  Facility EHR reviewed including BM record, vital signs, progress notes, MAR shows b/ps 87/48 - 94/54, no fevers, HR 78 - 88.  - 257. dialudid last used on 6/5. Patient reports she is going home on Saturday. She is ok with dicontinueing the dilauidid. She does need a new  "script of the methadone to get her through to her next apt. Nurse reports patient has more pinkness to legs today. And a small open area on right calf 1 cm x 0.6 cm. Patient denies increase in pain. States she doesn't think her legs are more pink. She states the area has been there for a long time. She denies fever and chills and increase in pain. I have no current concern for infection. Will have nursing monitor. I also noticed a slight increase in edema in her thighs. She doesn't think so but reports that her weight is up 3 lbs today from yesterday. She agrees to additional torsemide. Also due to lower blood pressures will decrease lisinopril today. Patient reports highes bladder scan was for 12ml.     ALLERGIES: Prednisone, Morphine, Morphine [fumaric acid], and Seasonal allergies    ROS:  4 point ROS including Respiratory, CV, GI and , other than that noted in the HPI,  is negative    Vitals:  BP 94/54   Pulse 80   Temp 98.6  F (37  C)   Resp 18   Ht 1.676 m (5' 6\")   Wt 111.4 kg (245 lb 8 oz)   SpO2 96%   BMI 39.62 kg/m    Exam:  GENERAL APPEARANCE:  Alert, in no distress  RESP:  respiratory effort normal  CV:  edema HAYDER has wraps on. Seems more in thighs. Thighs light pink. No concern for infection.   M/S:  Gait and station - in recliner.    SKIN:  Inspection and palpation of skin and subcutaneous tissue at baseline.   PSYCH:  insight and judgement, memory seems intact, affect and mood normal    ASSESSMENT/PLAN:  Pressure injury of skin of buttock, unspecified injury stage, unspecified laterality  Improving.  - continue nursing for wound care. plans to discharge this weekend and will have home care nursing.   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   - no changes  - continue QID blood sugar checks      Lymphedema  More today " than last week.  Continue Ocupational Therapy. Continue with lymph wraps at discharge.  Weight increasing a bit. Will increase torsemide.      Chronic pain - will send 30 tabs of methadone to the pharmacy so patient can get to her next apt.     Venous stasis ulcers of both lower extremities (H)  Continue current cares and lymph therapy. no current concern for infection.   - see wound care below and orders for monitor.      Paroxysmal atrial fibrillation (H)  Rate controlled. On warfarin for stroke prevention goal INR 2 - 3.     Hypertension  Lower than needed and adding a few days of extra torsemide  - decrease lisinopril.    Braga catheter in place  Braga removed and voiding without problems.       Medications reconcilled and updated in epic      Current Outpatient Medications   Medication Sig Dispense Refill     acetaminophen (TYLENOL) 325 MG tablet Take 2 tablets (650 mg) by mouth every 6 hours as needed for mild pain or other (and adjunct with moderate or severe pain or per patient request)       famotidine (PEPCID) 20 MG tablet Take 20 mg by mouth daily       insulin aspart (NOVOLOG PEN) 100 UNIT/ML pen Inject 1-7 Units Subcutaneous 3 times daily (before meals) Correction Scale - MEDIUM INSULIN RESISTANCE DOSING     Do Not give Correction Insulin if Pre-Meal BG less than 140.   For Pre-Meal  - 189 give 1 unit.   For Pre-Meal  - 239 give 2 units.   For Pre-Meal  - 289 give 3 units.   For Pre-Meal  - 339 give 4 units.   For Pre-Meal - 399 give 5 units.   For Pre-Meal -449 give 6 units  For Pre-Meal BG greater than or equal to 450 give 7 units.   To be given with prandial insulin, and based on pre-meal blood glucose.    Notify provider if glucose greater than or equal to 350 mg/dL after administration of correction dose.  If given at mealtime, administer within 30 minutes of start of meal. 15 mL      insulin aspart (NOVOLOG PEN) 100 UNIT/ML pen Inject 1-5 Units Subcutaneous At  Bedtime MEDIUM INSULIN RESISTANCE DOSING    Do Not give Bedtime Correction Insulin if BG less than  200.   For  - 249 give 1 units.   For  - 299 give 2 units.   For  - 349 give 3 units.   For  -399 give 4 units.   For BG greater than or equal to 400 give 5 units.  Notify provider if glucose greater than or equal to 350 mg/dL after administration of correction dose.  If given at mealtime, administer within 30 minutes of start of meal. 15 mL      lisinopril (ZESTRIL) 10 MG tablet Take 5 mg by mouth daily       metFORMIN (GLUCOPHAGE XR) 500 MG 24 hr tablet Take 2,000 mg by mouth daily (with dinner)       methadone (DOLOPHINE) 10 MG tablet Take 1 tablet (10 mg) by mouth daily At 1500.  Take in addition to the 5 mg three times daily dose 30 tablet 0     methadone (DOLOPHINE) 5 MG tablet Take 1 tablet (5 mg) by mouth 3 times daily At 0800, 1200 and 2100.  Take in addition to the 10 mg tablet at 1500. 50 tablet 0     miconazole (MICATIN) 2 % external powder Apply topically 2 times daily  0     multivitamin w/minerals (THERA-VIT-M) tablet Take 1 tablet by mouth daily       naloxone (NARCAN) 4 MG/0.1ML nasal spray Spray 1 spray (4 mg) into one nostril alternating nostrils as needed for opioid reversal every 2-3 minutes until assistance arrives 0.2 mL 1     ondansetron (ZOFRAN ODT) 4 MG ODT tab Take 1 tablet (4 mg) by mouth every 6 hours as needed for nausea or vomiting       pentoxifylline ER (TRENTAL) 400 MG CR tablet Take 400 mg by mouth 3 times daily (with meals)       simvastatin (ZOCOR) 20 MG tablet Take 20 mg by mouth At Bedtime       torsemide (DEMADEX) 10 MG tablet Take 10 mg by mouth 2 times daily       Warfarin Therapy Reminder Take 1 each by mouth See Admin Instructions 6-16-23: 1.25mg daily. Recheck INR 6/19       INSULIN PUMP ACCESSORIES MISC as directed (Patient not taking: Reported on 5/26/2023) 1 one year     ONE TOUCH TEST STRIPS TEST   VI use as directed (Patient taking differently:  1 strip by In Vitro route 4 times daily ACHS) 100 prn         Orders:  - Additional torsemide 10 mg p.o. twice daily x for 5 days  -Decrease lisinopril to 5 mg p.o. every day  -Check temp every shift x3 days update if greater than 100  -Monitor legs for increase in redness warmth and pain for 3 days  -Discontinue Dilaudid  -Right calf wound-cleanse with wound cleanser, Skin-Prep around wound, Primapore, change every other day    Electronically signed by: Vickie Ventura NP    Documentation of Face to Face and Certification for Home Health Services  I certify that services are/were furnished while this patient was under the care of a physician and that a physician or an allowed non-physician practitioner (NPP), had a face-to-face encounter that meets the physician face-to-face encounter requirements. The encounter was in whole, or in part, related to the primary reason for home health. The patient is confined to his/her home and needs intermittent skilled nursing, physical therapy, speech-language pathology, or the continued need for occupational therapy. A plan of care has been established by a physician and is periodically reviewed by a physician.  Date of Face-to-Face Encounter:  6/22/2023.    I certify that, based on my findings, the following services are medically necessary home health services: Nursing, Occupational Therapy and Physical Therapy.    My clinical findings support the need for the above skilled services because: Requires assistance of another person or specialized equipment to access medical services because patient: Range of motion limitations prevents ability to exit home safely...    Patient to re-establish plan of care with their PCP within 7-10 days after leaving the facility to reestablish care.  Medicare certified PECOS provider: Vickie Ventura NP   Date: June 22, 2023          Sincerely,        Vickie Ventura NP

## 2023-09-17 ENCOUNTER — HEALTH MAINTENANCE LETTER (OUTPATIENT)
Age: 78
End: 2023-09-17

## 2023-09-22 ENCOUNTER — HOSPITAL ENCOUNTER (INPATIENT)
Facility: CLINIC | Age: 78
LOS: 6 days | Discharge: NURSING, REHAB OR LONG-TERM CARE | DRG: 629 | End: 2023-09-29
Attending: EMERGENCY MEDICINE | Admitting: HOSPITALIST
Payer: COMMERCIAL

## 2023-09-22 ENCOUNTER — APPOINTMENT (OUTPATIENT)
Dept: GENERAL RADIOLOGY | Facility: CLINIC | Age: 78
DRG: 629 | End: 2023-09-22
Attending: FAMILY MEDICINE
Payer: COMMERCIAL

## 2023-09-22 DIAGNOSIS — T45.515A WARFARIN-INDUCED COAGULOPATHY (H): ICD-10-CM

## 2023-09-22 DIAGNOSIS — S91.302A NON-HEALING WOUND OF LEFT HEEL: ICD-10-CM

## 2023-09-22 DIAGNOSIS — I83.029 VENOUS STASIS ULCERS OF BOTH LOWER EXTREMITIES (H): ICD-10-CM

## 2023-09-22 DIAGNOSIS — F11.20 OPIOID DEPENDENCE, UNCOMPLICATED (H): Primary | ICD-10-CM

## 2023-09-22 DIAGNOSIS — E66.01 MORBID OBESITY (H): ICD-10-CM

## 2023-09-22 DIAGNOSIS — L03.213 PERIORBITAL CELLULITIS, UNSPECIFIED LATERALITY: ICD-10-CM

## 2023-09-22 DIAGNOSIS — Z79.4 TYPE 2 DIABETES MELLITUS WITH COMPLICATION, WITH LONG-TERM CURRENT USE OF INSULIN (H): ICD-10-CM

## 2023-09-22 DIAGNOSIS — L97.929 VENOUS STASIS ULCERS OF BOTH LOWER EXTREMITIES (H): ICD-10-CM

## 2023-09-22 DIAGNOSIS — L97.425 DIABETIC ULCER OF LEFT HEEL ASSOCIATED WITH TYPE 2 DIABETES MELLITUS, WITH MUSCLE INVOLVEMENT WITHOUT EVIDENCE OF NECROSIS (H): ICD-10-CM

## 2023-09-22 DIAGNOSIS — E66.01 OBESITY, CLASS III, BMI 40-49.9 (MORBID OBESITY) (H): ICD-10-CM

## 2023-09-22 DIAGNOSIS — I83.019 VENOUS STASIS ULCERS OF BOTH LOWER EXTREMITIES (H): ICD-10-CM

## 2023-09-22 DIAGNOSIS — Z78.9 UNABLE TO CARE FOR SELF: ICD-10-CM

## 2023-09-22 DIAGNOSIS — L97.919 VENOUS STASIS ULCERS OF BOTH LOWER EXTREMITIES (H): ICD-10-CM

## 2023-09-22 DIAGNOSIS — E11.621 DIABETIC ULCER OF LEFT HEEL ASSOCIATED WITH TYPE 2 DIABETES MELLITUS, WITH MUSCLE INVOLVEMENT WITHOUT EVIDENCE OF NECROSIS (H): ICD-10-CM

## 2023-09-22 DIAGNOSIS — E11.8 TYPE 2 DIABETES MELLITUS WITH COMPLICATION, WITH LONG-TERM CURRENT USE OF INSULIN (H): ICD-10-CM

## 2023-09-22 DIAGNOSIS — D68.32 WARFARIN-INDUCED COAGULOPATHY (H): ICD-10-CM

## 2023-09-22 DIAGNOSIS — Z86.79 HISTORY OF ATRIAL FIBRILLATION: ICD-10-CM

## 2023-09-22 LAB
ALBUMIN SERPL BCG-MCNC: 3.1 G/DL (ref 3.5–5.2)
ALP SERPL-CCNC: 82 U/L (ref 35–104)
ALT SERPL W P-5'-P-CCNC: 43 U/L (ref 0–50)
ANION GAP SERPL CALCULATED.3IONS-SCNC: 11 MMOL/L (ref 7–15)
AST SERPL W P-5'-P-CCNC: 90 U/L (ref 0–45)
BASOPHILS # BLD AUTO: 0 10E3/UL (ref 0–0.2)
BASOPHILS NFR BLD AUTO: 0 %
BILIRUB SERPL-MCNC: 0.6 MG/DL
BUN SERPL-MCNC: 55.9 MG/DL (ref 8–23)
CALCIUM SERPL-MCNC: 10.7 MG/DL (ref 8.8–10.2)
CHLORIDE SERPL-SCNC: 99 MMOL/L (ref 98–107)
CREAT SERPL-MCNC: 1.29 MG/DL (ref 0.51–0.95)
CRP SERPL-MCNC: 86.37 MG/L
DEPRECATED HCO3 PLAS-SCNC: 28 MMOL/L (ref 22–29)
EGFRCR SERPLBLD CKD-EPI 2021: 42 ML/MIN/1.73M2
EOSINOPHIL # BLD AUTO: 0 10E3/UL (ref 0–0.7)
EOSINOPHIL NFR BLD AUTO: 0 %
ERYTHROCYTE [DISTWIDTH] IN BLOOD BY AUTOMATED COUNT: 12.4 % (ref 10–15)
GLUCOSE BLDC GLUCOMTR-MCNC: 116 MG/DL (ref 70–99)
GLUCOSE SERPL-MCNC: 162 MG/DL (ref 70–99)
HCT VFR BLD AUTO: 33.4 % (ref 35–47)
HGB BLD-MCNC: 11.4 G/DL (ref 11.7–15.7)
IMM GRANULOCYTES # BLD: 0.1 10E3/UL
IMM GRANULOCYTES NFR BLD: 0 %
INR PPP: 4.08 (ref 0.85–1.15)
LYMPHOCYTES # BLD AUTO: 1.3 10E3/UL (ref 0.8–5.3)
LYMPHOCYTES NFR BLD AUTO: 9 %
MCH RBC QN AUTO: 30.6 PG (ref 26.5–33)
MCHC RBC AUTO-ENTMCNC: 34.1 G/DL (ref 31.5–36.5)
MCV RBC AUTO: 90 FL (ref 78–100)
MONOCYTES # BLD AUTO: 1 10E3/UL (ref 0–1.3)
MONOCYTES NFR BLD AUTO: 7 %
NEUTROPHILS # BLD AUTO: 11.7 10E3/UL (ref 1.6–8.3)
NEUTROPHILS NFR BLD AUTO: 84 %
NRBC # BLD AUTO: 0 10E3/UL
NRBC BLD AUTO-RTO: 0 /100
PLATELET # BLD AUTO: 230 10E3/UL (ref 150–450)
POTASSIUM SERPL-SCNC: 3.9 MMOL/L (ref 3.4–5.3)
PROT SERPL-MCNC: 6.7 G/DL (ref 6.4–8.3)
RBC # BLD AUTO: 3.72 10E6/UL (ref 3.8–5.2)
SODIUM SERPL-SCNC: 138 MMOL/L (ref 136–145)
WBC # BLD AUTO: 14.1 10E3/UL (ref 4–11)

## 2023-09-22 PROCEDURE — G0378 HOSPITAL OBSERVATION PER HR: HCPCS

## 2023-09-22 PROCEDURE — 82962 GLUCOSE BLOOD TEST: CPT

## 2023-09-22 PROCEDURE — 86140 C-REACTIVE PROTEIN: CPT | Performed by: EMERGENCY MEDICINE

## 2023-09-22 PROCEDURE — 96361 HYDRATE IV INFUSION ADD-ON: CPT

## 2023-09-22 PROCEDURE — 99285 EMERGENCY DEPT VISIT HI MDM: CPT | Mod: 25 | Performed by: EMERGENCY MEDICINE

## 2023-09-22 PROCEDURE — 36415 COLL VENOUS BLD VENIPUNCTURE: CPT | Performed by: EMERGENCY MEDICINE

## 2023-09-22 PROCEDURE — 85025 COMPLETE CBC W/AUTO DIFF WBC: CPT | Performed by: EMERGENCY MEDICINE

## 2023-09-22 PROCEDURE — 250N000011 HC RX IP 250 OP 636: Mod: JZ | Performed by: EMERGENCY MEDICINE

## 2023-09-22 PROCEDURE — 93005 ELECTROCARDIOGRAM TRACING: CPT | Performed by: EMERGENCY MEDICINE

## 2023-09-22 PROCEDURE — 258N000003 HC RX IP 258 OP 636: Performed by: EMERGENCY MEDICINE

## 2023-09-22 PROCEDURE — 71045 X-RAY EXAM CHEST 1 VIEW: CPT

## 2023-09-22 PROCEDURE — 85610 PROTHROMBIN TIME: CPT | Performed by: EMERGENCY MEDICINE

## 2023-09-22 PROCEDURE — 80053 COMPREHEN METABOLIC PANEL: CPT | Performed by: EMERGENCY MEDICINE

## 2023-09-22 PROCEDURE — 96374 THER/PROPH/DIAG INJ IV PUSH: CPT

## 2023-09-22 PROCEDURE — 93010 ELECTROCARDIOGRAM REPORT: CPT | Performed by: EMERGENCY MEDICINE

## 2023-09-22 RX ORDER — ONDANSETRON 2 MG/ML
4 INJECTION INTRAMUSCULAR; INTRAVENOUS EVERY 6 HOURS PRN
Status: DISCONTINUED | OUTPATIENT
Start: 2023-09-22 | End: 2023-09-23

## 2023-09-22 RX ORDER — CEFTRIAXONE 1 G/1
1 INJECTION, POWDER, FOR SOLUTION INTRAMUSCULAR; INTRAVENOUS ONCE
Status: COMPLETED | OUTPATIENT
Start: 2023-09-22 | End: 2023-09-22

## 2023-09-22 RX ORDER — ONDANSETRON 4 MG/1
4 TABLET, ORALLY DISINTEGRATING ORAL EVERY 6 HOURS PRN
Status: DISCONTINUED | OUTPATIENT
Start: 2023-09-22 | End: 2023-09-23

## 2023-09-22 RX ADMIN — SODIUM CHLORIDE, POTASSIUM CHLORIDE, SODIUM LACTATE AND CALCIUM CHLORIDE 500 ML: 600; 310; 30; 20 INJECTION, SOLUTION INTRAVENOUS at 19:44

## 2023-09-22 RX ADMIN — CEFTRIAXONE SODIUM 1 G: 1 INJECTION, POWDER, FOR SOLUTION INTRAMUSCULAR; INTRAVENOUS at 21:13

## 2023-09-22 ASSESSMENT — ACTIVITIES OF DAILY LIVING (ADL)
ADLS_ACUITY_SCORE: 35

## 2023-09-22 NOTE — ED PROVIDER NOTES
"  History     Chief Complaint   Patient presents with    Social Work Services     HPI  Linda DELGADILLO Awa Loredo is a 78 year old female with history notable for insulin-dependent diabetes, diabetic neuropathy, obesity, COPD, paroxysmal atrial fibrillation on warfarin, who is brought in by ambulance after welfare check performed by police.  Excerpt from law enforcement \"as I open the door I was hit with a putrid sense of fecal matter.  Lori was on the floor unable to get up or even get her pants on.  All the surfaces in her residence were cluttered and contains trash.  There is extrement on the floor in a different area of the resident that had not been cleaned up.  The apartment in this condition is on level and due to Lori unable to get up, take care of personal needs that she is in imminent danger of causing injury to herself.).    Patient denies any specific complaints.  She does state that she has neuropathy and cannot feel the wounds that are on her feet.  Denies any fevers vomiting or diarrhea.  No chest pain or shortness of breath.  No abdominal pain.    The patient's PMHx, Surgical Hx, Allergies, and Medications were all reviewed with the patient.    Allergies:  Allergies   Allergen Reactions    Prednisone Nausea and Vomiting    Morphine Nausea and Vomiting    Morphine [Fumaric Acid] Nausea and Vomiting    Seasonal Allergies Difficulty breathing       Problem List:    Patient Active Problem List    Diagnosis Date Noted    History of atrial fibrillation 09/22/2023     Priority: Medium    Non-healing wound of left heel 09/22/2023     Priority: Medium    Unable to care for self 09/22/2023     Priority: Medium    Cellulitis - bilateral lower extremities 05/27/2023     Priority: Medium    Decubitus ulcers - 5 present on buttocks/perineal region, numerous scabbed over and unstagable on shin and bilateral feet with some bloody blisters noted on feet 05/27/2023     Priority: Medium    " Warfarin-induced coagulopathy (H) 05/27/2023     Priority: Medium    Hypoglycemia 05/27/2023     Priority: Medium    Supratherapeutic INR 05/27/2023     Priority: Medium    Cellulitis of buttock 05/26/2023     Priority: Medium    Sepsis without acute organ dysfunction, due to unspecified organism (H) 05/26/2023     Priority: Medium    UTI (urinary tract infection) - possible 05/26/2023     Priority: Medium    GUSTAVO (acute kidney injury) (H) 05/26/2023     Priority: Medium    Dehydration 05/26/2023     Priority: Medium    Weakness 05/02/2023     Priority: Medium    Opioid dependence, uncomplicated (H) 04/26/2023     Priority: Medium    Abscess of left foot 10/31/2022     Priority: Medium    Vitamin D deficiency 09/08/2022     Priority: Medium    Anticoagulation monitoring, INR range 2-3 07/06/2022     Priority: Medium    Paroxysmal atrial fibrillation (H) 05/30/2022     Priority: Medium     Formatting of this note might be different from the original.  New onset during 5/2022 admission      Diabetic ulcer of left heel associated with type 2 diabetes mellitus, with muscle involvement without evidence of necrosis (H) 05/17/2022     Priority: Medium    Obesity, Class III, BMI 40-49.9 (morbid obesity) (H) 05/05/2022     Priority: Medium    Venous stasis ulcers of both lower extremities (H) 01/13/2022     Priority: Medium    Bilateral lower extremity edema 05/29/2019     Priority: Medium    Systolic murmur 05/29/2019     Priority: Medium    Umbilical hernia without obstruction and without gangrene 12/13/2018     Priority: Medium    Microalbuminuria due to type 2 diabetes mellitus (H) 10/25/2016     Priority: Medium    Osteopenia 05/20/2015     Priority: Medium    Primary hyperparathyroidism (H) 01/23/2013     Priority: Medium     Formatting of this note might be different from the original.  work up January 2013 Dr. Villareal      Hypotension 10/27/2011     Priority: Medium    Fibromyalgia 10/20/2011     Priority: Medium     Anxiety 10/20/2011     Priority: Medium    Chronic kidney disease 10/20/2011     Priority: Medium     Problem list name updated by automated process. Provider to review      Elevated liver enzymes 10/20/2011     Priority: Medium    Fracture of fibula, distal, left, closed 10/20/2011     Priority: Medium     ORIF 10/13/11      COPD (chronic obstructive pulmonary disease) (H) 2010     Priority: Medium    Chronic pain 2010     Priority: Medium    Allergic rhinitis 2008     Priority: Medium    Major depressive disorder, recurrent episode, moderate (H) 2008     Priority: Medium    Mixed hyperlipidemia due to type 2 diabetes mellitus (H) 2008     Priority: Medium     Formatting of this note is different from the original.    22 ASCVD 10 year risk: 37.9%     Last Lipids:  Last Lipids:  Chol: 2021 130   63  HDL: 2021 46  Non-HDL: 2021 84  Chol/HDL Ratio: 2021 2.83  LDL DIRECT: . No results found in past 5 years   LDL CHOLESTEROL (mg/dL)   Date Value   2021 71     22   The 10-year ASCVD risk score (Teddy SMITH Jr., et al., 2013) is: 37.9%    Values used to calculate the score:      Age: 77 years      Sex: Female      Is Non- : No      Diabetic: Yes      Tobacco smoker: No      Systolic Blood Pressure: 118 mmHg      Is BP treated: Yes      HDL Cholesterol: 46 mg/dL      Total Cholesterol: 130 mg/dL      Hypersomnia with sleep apnea 10/23/2007     Priority: Medium     Sleep study 2004 showing severe OBSTRUCTIVE SLEEP APNEA and recommend CPAP  Problem list name updated by automated process. Provider to review      Generalized osteoarthrosis, unspecified site 2006     Priority: Medium     2007: Linda felt that Naprosyn was not helpful.      Routine general medical examination at a health care facility 2006     Priority: Medium     Mammogram:  Mammogram due 07.  Pap: 2007  done.  Colonoscopy: scheduled 2/07.  Lipids: 12/22/2006: CHOL 132, HDL  40,LDL 77,TRIG 78,  Influenza vaccine: 10/06  Pneuovax  Vaccine: 11/06  Adacel  Vaccine: 1/07      Diabetic polyneuropathy associated with type 2 diabetes mellitus (H) 04/07/2006     Priority: Medium     February 26, 2007: on gabapentin.  She has been switched from gabapentin to lyrica.  Problem list name updated by automated process. Provider to review       OBESITY 03/13/2006     Priority: Medium     February 26, 2007: Body mass index is 48.07 kg/(m^2).       Mixed hyperlipidemia 12/05/2005     Priority: Medium     Lipids: 12/22/2006: CHOL 132, HDL  40,LDL 77,TRIG 78,  February 26, 2007: taking: Zetia and atorvastatin.      Insomnia 07/15/2005     Priority: Medium     February 26, 2007: see by Dr. Car. On ambien and trazadone.  Problem list name updated by automated process. Provider to review      Herpes zoster 06/20/2005     Priority: Medium     February 26, 2007: On gabapentin and lidoderm.   She has been switched from gabapentin to lyrica.  Problem list name updated by automated process. Provider to review      DEPRESSION 05/09/2005     Priority: Medium     February 26, 2007: Followed by Dr. Car. currently on: sertraline, bupropion, trazadone and ambilify.  2/28/08: Margaret was asked to speak with her psychiatrist about her psychiatric medications.      Essential hypertension with goal blood pressure less than 140/90 05/09/2005     Priority: Medium     Cardiology referral:   February 26, 2007: taking: losartan, lisinopril, labetalol, aspirin, and furosemide.  Problem list name updated by automated process. Provider to review    Formatting of this note is different from the original.    BP Readings from Last 1 Encounters:   07/11/22 118/66     CREATININE (mg/dL)   Date Value   06/04/2022 0.89     ALBUMIN TO CREATININE RATIO,RAND UR      (mg/g creat)   Date Value   11/12/2009 25.7      combination of stress and urge URINARY  INCONTINENCE 05/09/2005     Priority: Medium     February 26, 2007: On Detrol LA.      Type 2 diabetes mellitus with complication, with long-term current use of insulin (H) 04/18/2005     Priority: Medium     11/05: ophthalmology=Dr. German, exam 11/05 no evidence of diabetic retinopathy.  February 26, 2007: Poor control A1C      8.6   12/22/2006.   Medications: insulin pump: Humulog (Basal rate is 1.8 units and 1/2 usual meal bolus of insulin), Byetta, metformin. A CGMS (of insulin pump) is currently being done with diabetic educator Rosemary Pendleton.    [facility]: see scanned notes for details. Linda has not followed up with them as of yet.  Problem list name updated by automated process. Provider to review    Formatting of this note is different from the original.  diagnosis 2001    HEMOGLOBIN A1C MONITORING (POCT) (%)   Date Value   05/28/2022 7.4 (H)   06/17/2021 7.1 (H)          Past Medical History:    Past Medical History:   Diagnosis Date    Depressive disorder, not elsewhere classified     Herpes zoster without mention of complication     Hypercalcemia 2/24/2007    Mild intermittent asthma     Other urinary incontinence     Type II or unspecified type diabetes mellitus with renal manifestations, uncontrolled(250.42) (H)     Type II or unspecified type diabetes mellitus without mention of complication, not stated as uncontrolled     Unspecified essential hypertension        Past Surgical History:    Past Surgical History:   Procedure Laterality Date    CHOLECYSTECTOMY      ligation of fallopian tube      OPEN REDUCTION INTERNAL FIXATION ANKLE  10/13/11    left    pinning of left 2nd toe         Family History:    Family History   Problem Relation Age of Onset    Hypertension Mother     Heart Disease Father         heart attack and cardiomegaly    Diabetes Sister         type 2    Hypertension Sister     Respiratory Sister     Psychotic Disorder Sister         bipolar    Cancer Maternal  Grandmother         throat    Cancer Paternal Grandmother         gastric       Social History:  Marital Status:   [4]  Social History     Tobacco Use    Smoking status: Never    Smokeless tobacco: Never   Substance Use Topics    Alcohol use: No    Drug use: No        Medications:    acetaminophen (TYLENOL) 325 MG tablet  famotidine (PEPCID) 20 MG tablet  insulin aspart (NOVOLOG PEN) 100 UNIT/ML pen  insulin aspart (NOVOLOG PEN) 100 UNIT/ML pen  INSULIN PUMP ACCESSORIES MISC  lisinopril (ZESTRIL) 10 MG tablet  metFORMIN (GLUCOPHAGE XR) 500 MG 24 hr tablet  methadone (DOLOPHINE) 10 MG tablet  methadone (DOLOPHINE) 5 MG tablet  miconazole (MICATIN) 2 % external powder  multivitamin w/minerals (THERA-VIT-M) tablet  naloxone (NARCAN) 4 MG/0.1ML nasal spray  ondansetron (ZOFRAN ODT) 4 MG ODT tab  ONE TOUCH TEST STRIPS TEST   VI  pentoxifylline ER (TRENTAL) 400 MG CR tablet  simvastatin (ZOCOR) 20 MG tablet  torsemide (DEMADEX) 10 MG tablet  Warfarin Therapy Reminder          Review of Systems  Pertinent positives and negatives mentioned in HPI    Physical Exam   BP: 125/80  Temp: 98.5  F (36.9  C)  Resp: 10  SpO2: 100 %    Physical Exam  GEN: Awake, alert, and cooperative.  Appears distressed  HENT: Oral mucosa dry.  External ears and nose normal bilaterally.  EYES: EOM intact. Conjunctiva clear. No discharge.   NECK: Symmetric, freely mobile.   CV : Extremities warm and well perfused.  PULM: Normal effort. Speaking in full sentences.  No adventitious sounds in anterior fields  NEURO: Normal speech. Following commands.  Answering questions and interacting appropriately.   EXT: Erythema and scaly skin on bilateral legs consistent with known venous stasis.  There is a wound on her left heel with central eschar.  This small wound on right leg.  Erythema of intertriginous areas under pannus and around perineum.  See photos below.  INT: Warm. No diaphoresis.         ED Course        Procedures         EKG: Interpreted  by Joshua Vega MD narrow complex irregular rhythm with rate of 89 bpm.  Occasional ectopic beat.  Delayed R wave progression.  Nonspecific flattening of T waves.  Significant artifact limiting interpretation.  No ST segment elevations.   No significant change compared to 5/31/2023.    Critical Care time:  none               No results found for this or any previous visit (from the past 24 hour(s)).    Medications   lactated ringers BOLUS 500 mL (has no administration in time range)       Assessments & Plan (with Medical Decision Making)   78 year old female with past medical history notable for insulin-dependent diabetes, obesity, neuropathy, venous stasis and pressure ulcers, brought in by Police Department after welfare check and patient found to be unable to care for self in apartment deemed unlivable by law enforcement.  Patient has wound on the left heel with large eschar.  Legs with chronic venous stasis changes.  Erythema of the skin and intertriginous  areas under breasts and pannus.  Redness in perineum but no breakdown of skin.  Patient will require hospitalization as she has no safe disposition.  Will obtain CBC, CMP, urinalysis, CRP as well as INR.    Case discussed with Kevin Parks with hospitalist service who accepted admission transition orders placed.    Labs resulted in leukocytosis of 14.1 with left shift.  INR supratherapeutic at 4.08.  CRP elevated 86.  Creatinine elevated to 1.29, BUN 55.9 and calcium 10.7.  Major with these values as likely an infection with skin source given her presentation.  Urinalysis however not yet resulted.  Also with mild dehydration probably complicated by her underlying diabetes.  Dose of ceftriaxone was given while in the emergency department.    I have reviewed the nursing notes.         New Prescriptions    No medications on file       Final diagnoses:   Type 2 diabetes mellitus with complication, with long-term current use of insulin (H)   Obesity, Class  III, BMI 40-49.9 (morbid obesity) (H)   Non-healing wound of left heel   Morbid obesity (H)   Unable to care for self   History of atrial fibrillation     Joshua Vega MD  9/22/2023 5:55 PM       9/22/2023   [facility] EMERGENCY DEPT    Disclaimer: This note consists of words and symbols derived from keyboarding and dictation using voice recognition software.  As a result, there may be errors that have gone undetected.  Please consider this when interpreting information found in this note.               Joshua Vega MD  09/22/23 2014

## 2023-09-22 NOTE — ED NOTES
[place] Stefan called to speak with our charge nurse in regards to this patient. Charge nurse will call back when available. [phone]

## 2023-09-22 NOTE — ED TRIAGE NOTES
"Pt states that she has an issue with her wheelchair. She lowered herself to the ground to work on fixing her wheelchair. Pt states that her daughter called the police to check on her as she is currently in [place] and worries about her. Pt states that she feels safe where she lives \"until they came barging in\".     EMS came with form filled out by the PD regarding concern for unsafe living conditions. Reports of home cluttered with trash and fecal matter throughout living environment as well as on pt. Pt was found on the ground and unable to get herself back up. Time of how long pt was down is unknown. Pt reports that she has had multiple falls and that she has even needed to cut her pants when she has needed to clean her brief that she wears for incontinent bowel episodes.     Assessment upon arrival:  Pt has open wounds/scabs on BLE and bottom of feet; extensive excoriated skin under abdominal folds, thighs, and buttocks; scabs and open wounds on back of thighs and rectum. MD notified of findings.  Thorough sreekanth cares completed, with skin cleanser and barrier cream to affected areas; pillows placed under abdominal folds for skin breakdown prevention.   During sreekanth-cares, staff extracted maggots from vaginal region as well as from necrotic open wound on L heel.     Pt states that she has home health care RN through [facility] comes every 3 to 4 days.     [facility] is working on opening up a case for her at this time.         "

## 2023-09-22 NOTE — ED NOTES
Pt visibly shaking and stated that she was cold. Temp taken orally-98.0. Warm blankets given. Staff actively working on obtaining IV access (2 attempts unsuccessful, so US being used). Blood work and then IV fluids to be given.

## 2023-09-22 NOTE — ED NOTES
Spoke with Officer Stefan.  [facility] asked him to call the ER to give additional information.  Pt had a recent fall and was down for an unknown amount of time. Pt was found laying in her excrement. This pt does not have a safe place to return to. Her apartment has been condemned and deemed unlivable.

## 2023-09-23 ENCOUNTER — APPOINTMENT (OUTPATIENT)
Dept: ULTRASOUND IMAGING | Facility: CLINIC | Age: 78
DRG: 629 | End: 2023-09-23
Attending: HOSPITALIST
Payer: COMMERCIAL

## 2023-09-23 ENCOUNTER — APPOINTMENT (OUTPATIENT)
Dept: PHYSICAL THERAPY | Facility: CLINIC | Age: 78
DRG: 629 | End: 2023-09-23
Payer: COMMERCIAL

## 2023-09-23 LAB
ALBUMIN UR-MCNC: 30 MG/DL
ANION GAP SERPL CALCULATED.3IONS-SCNC: 12 MMOL/L (ref 7–15)
APPEARANCE UR: ABNORMAL
BACTERIA #/AREA URNS HPF: ABNORMAL /HPF
BILIRUB UR QL STRIP: NEGATIVE
BUN SERPL-MCNC: 51 MG/DL (ref 8–23)
CALCIUM SERPL-MCNC: 9.9 MG/DL (ref 8.8–10.2)
CHLORIDE SERPL-SCNC: 101 MMOL/L (ref 98–107)
CK SERPL-CCNC: 498 U/L (ref 26–192)
COLOR UR AUTO: YELLOW
CREAT SERPL-MCNC: 1.14 MG/DL (ref 0.51–0.95)
DEPRECATED HCO3 PLAS-SCNC: 27 MMOL/L (ref 22–29)
EGFRCR SERPLBLD CKD-EPI 2021: 49 ML/MIN/1.73M2
ERYTHROCYTE [DISTWIDTH] IN BLOOD BY AUTOMATED COUNT: 12.2 % (ref 10–15)
GLUCOSE BLDC GLUCOMTR-MCNC: 102 MG/DL (ref 70–99)
GLUCOSE BLDC GLUCOMTR-MCNC: 122 MG/DL (ref 70–99)
GLUCOSE BLDC GLUCOMTR-MCNC: 162 MG/DL (ref 70–99)
GLUCOSE BLDC GLUCOMTR-MCNC: 166 MG/DL (ref 70–99)
GLUCOSE BLDC GLUCOMTR-MCNC: 95 MG/DL (ref 70–99)
GLUCOSE SERPL-MCNC: 113 MG/DL (ref 70–99)
GLUCOSE UR STRIP-MCNC: NEGATIVE MG/DL
HBA1C MFR BLD: 7 %
HCT VFR BLD AUTO: 32.5 % (ref 35–47)
HGB BLD-MCNC: 10.7 G/DL (ref 11.7–15.7)
HGB UR QL STRIP: ABNORMAL
INR PPP: 4.97 (ref 0.85–1.15)
KETONES UR STRIP-MCNC: NEGATIVE MG/DL
LEUKOCYTE ESTERASE UR QL STRIP: ABNORMAL
MCH RBC QN AUTO: 30.1 PG (ref 26.5–33)
MCHC RBC AUTO-ENTMCNC: 32.9 G/DL (ref 31.5–36.5)
MCV RBC AUTO: 92 FL (ref 78–100)
MRSA DNA SPEC QL NAA+PROBE: POSITIVE
NITRATE UR QL: NEGATIVE
PH UR STRIP: 5 [PH] (ref 5–7)
PLATELET # BLD AUTO: 196 10E3/UL (ref 150–450)
POTASSIUM SERPL-SCNC: 3.5 MMOL/L (ref 3.4–5.3)
PROCALCITONIN SERPL IA-MCNC: 0.27 NG/ML
RBC # BLD AUTO: 3.55 10E6/UL (ref 3.8–5.2)
RBC URINE: 146 /HPF
SA TARGET DNA: POSITIVE
SARS-COV-2 RNA RESP QL NAA+PROBE: NEGATIVE
SODIUM SERPL-SCNC: 140 MMOL/L (ref 136–145)
SP GR UR STRIP: 1.01 (ref 1–1.03)
SQUAMOUS EPITHELIAL: 2 /HPF
UROBILINOGEN UR STRIP-MCNC: NORMAL MG/DL
WBC # BLD AUTO: 11 10E3/UL (ref 4–11)
WBC CLUMPS #/AREA URNS HPF: PRESENT /HPF
WBC URINE: >182 /HPF

## 2023-09-23 PROCEDURE — 250N000013 HC RX MED GY IP 250 OP 250 PS 637: Performed by: HOSPITALIST

## 2023-09-23 PROCEDURE — 250N000011 HC RX IP 250 OP 636: Mod: JZ | Performed by: HOSPITALIST

## 2023-09-23 PROCEDURE — 250N000012 HC RX MED GY IP 250 OP 636 PS 637: Performed by: HOSPITALIST

## 2023-09-23 PROCEDURE — 80048 BASIC METABOLIC PNL TOTAL CA: CPT | Performed by: HOSPITALIST

## 2023-09-23 PROCEDURE — 250N000013 HC RX MED GY IP 250 OP 250 PS 637: Performed by: STUDENT IN AN ORGANIZED HEALTH CARE EDUCATION/TRAINING PROGRAM

## 2023-09-23 PROCEDURE — 99222 1ST HOSP IP/OBS MODERATE 55: CPT | Mod: 95 | Performed by: HOSPITALIST

## 2023-09-23 PROCEDURE — 87086 URINE CULTURE/COLONY COUNT: CPT | Performed by: HOSPITALIST

## 2023-09-23 PROCEDURE — 97110 THERAPEUTIC EXERCISES: CPT | Mod: GP | Performed by: PHYSICAL MEDICINE & REHABILITATION

## 2023-09-23 PROCEDURE — 97162 PT EVAL MOD COMPLEX 30 MIN: CPT | Mod: GP | Performed by: PHYSICAL MEDICINE & REHABILITATION

## 2023-09-23 PROCEDURE — 93970 EXTREMITY STUDY: CPT

## 2023-09-23 PROCEDURE — 82550 ASSAY OF CK (CPK): CPT | Performed by: HOSPITALIST

## 2023-09-23 PROCEDURE — 87635 SARS-COV-2 COVID-19 AMP PRB: CPT | Performed by: HOSPITALIST

## 2023-09-23 PROCEDURE — 36415 COLL VENOUS BLD VENIPUNCTURE: CPT | Performed by: HOSPITALIST

## 2023-09-23 PROCEDURE — 85027 COMPLETE CBC AUTOMATED: CPT | Performed by: HOSPITALIST

## 2023-09-23 PROCEDURE — 83036 HEMOGLOBIN GLYCOSYLATED A1C: CPT | Performed by: HOSPITALIST

## 2023-09-23 PROCEDURE — G0378 HOSPITAL OBSERVATION PER HR: HCPCS

## 2023-09-23 PROCEDURE — 99207 PR NOT IN PERSON INPATIENT CONSULT STATISTICAL MARKER: CPT | Performed by: HOSPITALIST

## 2023-09-23 PROCEDURE — 81001 URINALYSIS AUTO W/SCOPE: CPT | Performed by: HOSPITALIST

## 2023-09-23 PROCEDURE — 84145 PROCALCITONIN (PCT): CPT | Performed by: HOSPITALIST

## 2023-09-23 PROCEDURE — 120N000001 HC R&B MED SURG/OB

## 2023-09-23 PROCEDURE — 87641 MR-STAPH DNA AMP PROBE: CPT | Performed by: HOSPITALIST

## 2023-09-23 PROCEDURE — 85610 PROTHROMBIN TIME: CPT | Performed by: HOSPITALIST

## 2023-09-23 RX ORDER — FAMOTIDINE 20 MG/1
20 TABLET, FILM COATED ORAL 2 TIMES DAILY
Status: ON HOLD | COMMUNITY
End: 2023-09-23

## 2023-09-23 RX ORDER — MINERAL OIL/HYDROPHIL PETROLAT
OINTMENT (GRAM) TOPICAL
Status: DISCONTINUED | OUTPATIENT
Start: 2023-09-23 | End: 2023-09-29 | Stop reason: HOSPADM

## 2023-09-23 RX ORDER — DEXTROSE MONOHYDRATE 25 G/50ML
25-50 INJECTION, SOLUTION INTRAVENOUS
Status: DISCONTINUED | OUTPATIENT
Start: 2023-09-23 | End: 2023-09-29 | Stop reason: HOSPADM

## 2023-09-23 RX ORDER — SEMAGLUTIDE 0.68 MG/ML
0.5 INJECTION, SOLUTION SUBCUTANEOUS WEEKLY
Status: ON HOLD | COMMUNITY
Start: 2023-08-23 | End: 2023-11-15

## 2023-09-23 RX ORDER — METHADONE HYDROCHLORIDE 5 MG/1
5 TABLET ORAL 3 TIMES DAILY
Status: DISCONTINUED | OUTPATIENT
Start: 2023-09-23 | End: 2023-09-23

## 2023-09-23 RX ORDER — CEFAZOLIN SODIUM 1 G/50ML
1250 SOLUTION INTRAVENOUS EVERY 24 HOURS
Status: DISCONTINUED | OUTPATIENT
Start: 2023-09-24 | End: 2023-09-25

## 2023-09-23 RX ORDER — NALOXONE HYDROCHLORIDE 0.4 MG/ML
0.2 INJECTION, SOLUTION INTRAMUSCULAR; INTRAVENOUS; SUBCUTANEOUS
Status: DISCONTINUED | OUTPATIENT
Start: 2023-09-23 | End: 2023-09-29 | Stop reason: HOSPADM

## 2023-09-23 RX ORDER — CEFTRIAXONE 1 G/1
1 INJECTION, POWDER, FOR SOLUTION INTRAMUSCULAR; INTRAVENOUS EVERY 24 HOURS
Status: DISCONTINUED | OUTPATIENT
Start: 2023-09-23 | End: 2023-09-25

## 2023-09-23 RX ORDER — VANCOMYCIN HYDROCHLORIDE 1 G/200ML
1000 INJECTION, SOLUTION INTRAVENOUS EVERY 24 HOURS
Status: DISCONTINUED | OUTPATIENT
Start: 2023-09-23 | End: 2023-09-23

## 2023-09-23 RX ORDER — INSULIN GLARGINE 100 [IU]/ML
45 INJECTION, SOLUTION SUBCUTANEOUS AT BEDTIME
Status: ON HOLD | COMMUNITY
Start: 2023-08-21 | End: 2023-09-23

## 2023-09-23 RX ORDER — ONDANSETRON 2 MG/ML
4 INJECTION INTRAMUSCULAR; INTRAVENOUS EVERY 6 HOURS PRN
Status: DISCONTINUED | OUTPATIENT
Start: 2023-09-23 | End: 2023-09-29 | Stop reason: HOSPADM

## 2023-09-23 RX ORDER — METHADONE HYDROCHLORIDE 5 MG/1
10 TABLET ORAL DAILY
Status: DISCONTINUED | OUTPATIENT
Start: 2023-09-23 | End: 2023-09-29 | Stop reason: HOSPADM

## 2023-09-23 RX ORDER — ONDANSETRON 4 MG/1
4 TABLET, ORALLY DISINTEGRATING ORAL EVERY 6 HOURS PRN
Status: DISCONTINUED | OUTPATIENT
Start: 2023-09-23 | End: 2023-09-29 | Stop reason: HOSPADM

## 2023-09-23 RX ORDER — OXYCODONE AND ACETAMINOPHEN 5; 325 MG/1; MG/1
1-2 TABLET ORAL EVERY 6 HOURS PRN
Status: ON HOLD | COMMUNITY
Start: 2023-09-15 | End: 2023-10-20

## 2023-09-23 RX ORDER — ACETAMINOPHEN 325 MG/1
650 TABLET ORAL EVERY 6 HOURS PRN
Status: DISCONTINUED | OUTPATIENT
Start: 2023-09-23 | End: 2023-09-29 | Stop reason: HOSPADM

## 2023-09-23 RX ORDER — ASCORBIC ACID 500 MG
500 TABLET ORAL DAILY
COMMUNITY
End: 2023-12-27

## 2023-09-23 RX ORDER — BISACODYL 10 MG
10 SUPPOSITORY, RECTAL RECTAL DAILY PRN
Status: DISCONTINUED | OUTPATIENT
Start: 2023-09-23 | End: 2023-09-29 | Stop reason: HOSPADM

## 2023-09-23 RX ORDER — WARFARIN SODIUM 2.5 MG/1
TABLET ORAL
Status: ON HOLD | COMMUNITY
Start: 2023-09-19 | End: 2023-11-15

## 2023-09-23 RX ORDER — NALOXONE HYDROCHLORIDE 0.4 MG/ML
0.4 INJECTION, SOLUTION INTRAMUSCULAR; INTRAVENOUS; SUBCUTANEOUS
Status: DISCONTINUED | OUTPATIENT
Start: 2023-09-23 | End: 2023-09-29 | Stop reason: HOSPADM

## 2023-09-23 RX ORDER — NICOTINE POLACRILEX 4 MG
15-30 LOZENGE BUCCAL
Status: DISCONTINUED | OUTPATIENT
Start: 2023-09-23 | End: 2023-09-29 | Stop reason: HOSPADM

## 2023-09-23 RX ORDER — METHADONE HYDROCHLORIDE 5 MG/1
10 TABLET ORAL DAILY
Status: DISCONTINUED | OUTPATIENT
Start: 2023-09-23 | End: 2023-09-23

## 2023-09-23 RX ORDER — METHADONE HYDROCHLORIDE 5 MG/1
5 TABLET ORAL 3 TIMES DAILY
Status: DISCONTINUED | OUTPATIENT
Start: 2023-09-23 | End: 2023-09-29 | Stop reason: HOSPADM

## 2023-09-23 RX ADMIN — ACETAMINOPHEN 650 MG: 325 TABLET, FILM COATED ORAL at 16:29

## 2023-09-23 RX ADMIN — VANCOMYCIN HYDROCHLORIDE 1000 MG: 1 INJECTION, SOLUTION INTRAVENOUS at 03:25

## 2023-09-23 RX ADMIN — METHADONE HYDROCHLORIDE 5 MG: 5 TABLET ORAL at 19:51

## 2023-09-23 RX ADMIN — METHADONE HYDROCHLORIDE 10 MG: 5 TABLET ORAL at 14:45

## 2023-09-23 RX ADMIN — ACETAMINOPHEN 650 MG: 325 TABLET, FILM COATED ORAL at 08:14

## 2023-09-23 RX ADMIN — INSULIN ASPART 1 UNITS: 100 INJECTION, SOLUTION INTRAVENOUS; SUBCUTANEOUS at 17:24

## 2023-09-23 RX ADMIN — CEFTRIAXONE SODIUM 1 G: 1 INJECTION, POWDER, FOR SOLUTION INTRAMUSCULAR; INTRAVENOUS at 20:53

## 2023-09-23 ASSESSMENT — ACTIVITIES OF DAILY LIVING (ADL)
ADLS_ACUITY_SCORE: 38
ADLS_ACUITY_SCORE: 38
TOILETING_ISSUES: NO
DOING_ERRANDS_INDEPENDENTLY_DIFFICULTY: NO
HEARING_DIFFICULTY_OR_DEAF: NO
ADLS_ACUITY_SCORE: 38
ADLS_ACUITY_SCORE: 35
DIFFICULTY_EATING/SWALLOWING: NO
TRANSFERRING: 0-->INDEPENDENT
ADLS_ACUITY_SCORE: 35
ADLS_ACUITY_SCORE: 38
FALL_HISTORY_WITHIN_LAST_SIX_MONTHS: YES
WALKING_OR_CLIMBING_STAIRS_DIFFICULTY: YES
NUMBER_OF_TIMES_PATIENT_HAS_FALLEN_WITHIN_LAST_SIX_MONTHS: 2
ADLS_ACUITY_SCORE: 42
WEAR_GLASSES_OR_BLIND: YES
ADLS_ACUITY_SCORE: 35
CONCENTRATING,_REMEMBERING_OR_MAKING_DECISIONS_DIFFICULTY: NO
ADLS_ACUITY_SCORE: 35
WALKING_OR_CLIMBING_STAIRS: TRANSFERRING DIFFICULTY, REQUIRES EQUIPMENT
DIFFICULTY_COMMUNICATING: NO
ADLS_ACUITY_SCORE: 35
CHANGE_IN_FUNCTIONAL_STATUS_SINCE_ONSET_OF_CURRENT_ILLNESS/INJURY: NO
DRESSING/BATHING_DIFFICULTY: NO
ADLS_ACUITY_SCORE: 35
TRANSFERRING: 0-->INDEPENDENT
ADLS_ACUITY_SCORE: 20

## 2023-09-23 NOTE — ED NOTES
Call back to daughter as patient is unable to talk for periods of time - daughter updated about admission per okay from patient - daughter gave history stating she last saw patient about 9 days ago when they delivered lunch for her. Normally this patient is able to get herself into a wheelchair and wheel down from her apartment to the vestibule for visiting. She lives in a senior living setting, independently - does not allow family/daughter to see her apartment and is reluctant to have daughter go to medical appointments with her. Daughter states they have a  involved in her care. States that her father passed away recently. Patient has her phone in her hand although is quite weak and has not answered the cell phone. When prompted, she will respond but will fall back asleep.

## 2023-09-23 NOTE — PROGRESS NOTES
[place] [facility] [place]    Patient admitted to room 2307 approximately 2130 via cart from emergency room. Patient was accompanied by transport tech.     Verbal SBAR report received from Velma GARCIA prior to patient arrival.     Patient trasferred to bed via air silvio. Patient alert and oriented X 3. The patient is not having any pain.  . Admission vital signs: Blood pressure 109/62, pulse 81, temperature 98.7  F (37.1  C), temperature source Oral, resp. rate 12, SpO2 (!) 86 %. Patient was oriented to plan of care, call light, bed controls, tv, telephone, bathroom, and visiting hours.     Risk Assessment    The following safety risks were identified during admission: fall. Yellow risk band applied: YES.     Skin Initial Assessment    This writer admitted this patient and completed a full skin assessment and Suresh score in the Adult PCS flowsheet. Appropriate interventions initiated as needed.     Secondary skin check completed by petr GARCIA.         Education    Patient has a [place] to Observation order: Yes  Observation education completed and documented: Yes      Mary Schirmers, RN

## 2023-09-23 NOTE — PROGRESS NOTES
[place] [facility] [place]    Patient admitted to room 2307 at approximately 2140 via cart from emergency room. Patient was accompanied by nurse.     Verbal SBAR report received from Velma prior to patient arrival.     Patient trasferred to bed via air silvio. Patient alert and oriented X 4. Pain is controlled without any medications.  . Admission vital signs: Blood pressure 109/62, pulse 81, temperature 98.7  F (37.1  C), temperature source Oral, resp. rate 12, SpO2 (!) 86 %. Patient was oriented to plan of care, call light, bed controls, tv, telephone, bathroom, and visiting hours.     Risk Assessment    The following safety risks were identified during admission: fall. Yellow risk band applied: YES.     Skin Initial Assessment    This writer admitted this patient and completed a full skin assessment and Suresh score in the Adult PCS flowsheet. Appropriate interventions initiated as needed.     Secondary skin check completed by Lyndsey Lawson    Patient has a [place] to Observation order: NA  Observation education completed and documented: N/A      Maira Salas RN

## 2023-09-23 NOTE — ED NOTES
[facility]   Admission Handoff    The patient is Linda Loredo, 78 year old who arrived in the ED by AMBULANCE from emergency room with a complaint of Social Work Services  . The patient's current symptoms are new and during this time the symptoms have increased. In the ED, patient was diagnosed with   Final diagnoses:   Type 2 diabetes mellitus with complication, with long-term current use of insulin (H)   Obesity, Class III, BMI 40-49.9 (morbid obesity) (H)   Non-healing wound of left heel   Morbid obesity (H)   Unable to care for self   History of atrial fibrillation         Needed?: No    Allergies:    Allergies   Allergen Reactions    Prednisone Nausea and Vomiting    Morphine Nausea and Vomiting    Morphine [Fumaric Acid] Nausea and Vomiting    Seasonal Allergies Difficulty breathing       Past Medical Hx:   Past Medical History:   Diagnosis Date    Depressive disorder, not elsewhere classified     Herpes zoster without mention of complication     Hypercalcemia 2/24/2007    Mild intermittent asthma     Other urinary incontinence     Type II or unspecified type diabetes mellitus with renal manifestations, uncontrolled(250.42) (H)     Type II or unspecified type diabetes mellitus without mention of complication, not stated as uncontrolled     Unspecified essential hypertension        Initial vitals were: BP: 125/80  Pulse: 106  Temp: 98.5  F (36.9  C)  Resp: 10  SpO2: 100 %   Recent vital Signs: /81   Pulse 106   Temp 98.5  F (36.9  C) (Oral)   Resp 10   SpO2 100%     Elimination Status: Continent: No     Activity Level: Total assist/lift    Fall Status: Reason for falls risk:  Mobility, Reason for falls risk: Elimination, Reason for falls risk: Cognition, and Reason for falls risk: High Risk Medications  bed/chair alarm on    Baseline Mental status: cognitively impaired  Current Mental Status changes: lethargic    Infection present or suspected this  encounter: yes skin/wound/contact  Sepsis suspected: No    Isolation type: Contact    Bariatric equipment needed?: No    In the ED these meds were given:   Medications   lactated ringers BOLUS 500 mL (500 mLs Intravenous $New Bag 9/22/23 1944)       Drips running?  Yes    Home pump  No    Current LDAs: Peripheral IV: Site left upper arm; Gauge 20  lactated Ringer's     Results:   Labs/Imaging  Ordered and Resulted from Time of ED Arrival Up to the Time of Departure from the ED  Results for orders placed or performed during the hospital encounter of 09/22/23 (from the past 24 hour(s))   CBC with platelets differential    Narrative    The following orders were created for panel order CBC with platelets differential.  Procedure                               Abnormality         Status                     ---------                               -----------         ------                     CBC with platelets and d...[[id number]]  Abnormal            Final result                 Please view results for these tests on the individual orders.   CBC with platelets and differential   Result Value Ref Range    WBC Count 14.1 (H) 4.0 - 11.0 10e3/uL    RBC Count 3.72 (L) 3.80 - 5.20 10e6/uL    Hemoglobin 11.4 (L) 11.7 - 15.7 g/dL    Hematocrit 33.4 (L) 35.0 - 47.0 %    MCV 90 78 - 100 fL    MCH 30.6 26.5 - 33.0 pg    MCHC 34.1 31.5 - 36.5 g/dL    RDW 12.4 10.0 - 15.0 %    Platelet Count 230 150 - 450 10e3/uL    % Neutrophils 84 %    % Lymphocytes 9 %    % Monocytes 7 %    % Eosinophils 0 %    % Basophils 0 %    % Immature Granulocytes 0 %    NRBCs per 100 WBC 0 <1 /100    Absolute Neutrophils 11.7 (H) 1.6 - 8.3 10e3/uL    Absolute Lymphocytes 1.3 0.8 - 5.3 10e3/uL    Absolute Monocytes 1.0 0.0 - 1.3 10e3/uL    Absolute Eosinophils 0.0 0.0 - 0.7 10e3/uL    Absolute Basophils 0.0 0.0 - 0.2 10e3/uL    Absolute Immature Granulocytes 0.1 <=0.4 10e3/uL    Absolute NRBCs 0.0 10e3/uL       For the majority of the shift this patient's  behavior was Green     Cardiac Rhythm: Normal Sinus  Pt needs tele? No  Skin/wound Issues:  yes    Code Status: unsure    Pain control: good    Nausea control: good    Abnormal labs/tests/findings requiring intervention:     Patient tested for COVID 19 prior to admission: YES     OBS brochure/video discussed/provided to patient/family: No     Family present during ED course? No     Family Comments/Social Situation comments: No family - found at home in unlivable situation    Tasks needing completion: None    Velma Gurrola RN

## 2023-09-23 NOTE — PROGRESS NOTES
"Reason for Admission:  History of A. Fib.    Activity:  Bed bound    Neuro: A&O x 4. Bilateral numbness present in feet with wound areas being painful.    Cardiac: Apical pulse irregular       Resp: Diminished lung sounds    GI/: Purewick in place, incontinent to bowel. Large bowl movement.     Skin: See flowsheet    Diet: Carb count    IV Access/Drains: Saline locked    Vitals: /45   Pulse 79   Temp 98  F (36.7  C) (Oral)   Resp 18   Ht 1.676 m (5' 6\")   Wt 120.5 kg (265 lb 10.5 oz)   SpO2 100%   BMI 42.88 kg/m       Pain:  Scheduled methadone given for pain.       "

## 2023-09-23 NOTE — PROGRESS NOTES
[facility]    History and Physical - Hospitalist Service       Date of Admission:  9/22/2023    Assessment & Plan      Linda ELIE Lordeo is a 78 year old lady w/ hx of HTN, CKD III, HLD, Vit D def, obesity, pAfib on coumadin, DM2, venous stasis brought in by police after welfare check found to have leg swelling and redness concerning for cellulitis       This am;  Patient looks well. Comfortable. Having breakfast. Says she takes methadone and not received yet.     # likley chronic venous statis more than Cellulitis  #Multiple bodily ulcers  - cont on Vanco and ceftriaxone  - WOC cosult  -bilateral doppler legs  - wound care   - check MRSA   - check procalcitonin   - Prescribe methadone    # DM2  - ISS for now / accuchecks / ADA diet    # CKD III  # Hypercalcemia   - monitor    # pAfib  # Supratherapeutic INR  Ventricular rate controlled  - check INR  - RESUME coumadin based on INR    # HTN  BP stable     PT/OT evaluation;  Patient apartment is condemned so she will need discharge plan    Medication reconciliation will need to be completed on this patient.        Diet: Moderate Consistent Carb (60 g CHO per Meal) Diet    DVT Prophylaxis: HOLD coumadin  Braga Catheter: Not present  Lines: None     Code Status: Full Code      Clinically Significant Risk Factors Present on Admission           # Hypercalcemia: Highest Ca = 10.7 mg/dL in last 2 days, will monitor as appropriate    # Hypoalbuminemia: Lowest albumin = 3.1 g/dL at 9/22/2023  7:41 PM, will monitor as appropriate  # Drug Induced Coagulation Defect: home medication list includes an anticoagulant medication      # Hypertension: Noted on problem list       # DMII: A1C = 7.0 % (Ref range: <5.7 %) within past 6 months                 Expected discharge: >2 days.  Recommended to prior living arrangement once acute medical condition stabilized.   The patient's care was discussed with the Bedside RN      Singh Sparrow  MD Ruchi  [facility]  Securely message with the Vocera Web Console (learn more here)  Text page via [employer] Paging/Directory        Visit/Communication Style   Virtual (Video) communication was used to evaluate Jim.  Jim consented to the use of video communication: yes  Patient's location: [facility]   Provider's location during the visit: remote [facility] site        ______________________________________________________________________        Physical Exam   Vital Signs: Temp: 98  F (36.7  C) Temp src: Oral BP: 120/61 Pulse: 81   Resp: 16 SpO2: 100 % O2 Device: None (Room air) Oxygen Delivery: 2 LPM  Weight: 0 lbs 0 oz    Gen:  Well-developed, well-nourished, in no acute distress, lying semi-supine in hospital stretcher  HEENT:  Anicteric sclera, PER, hearing intact to voice  Resp:  No accessory muscle use, breath sounds clear; no wheezes no rales no rhonchi  Card:  No murmur, normal S1, S2   Abd:  Soft per RN exam, no TTP, non-distended, normoactive bowel sounds are present  Musc:  Normal strength and movement of the major muscle groups without obvious deformity  Psych:  Good insight, oriented to person, place and time, not anxious, not agitated        Data     Recent Labs   Lab 09/23/23  0759 09/23/23  0533 09/23/23  0206 09/22/23  2221 09/22/23  1941   WBC  --  11.0  --   --  14.1*   HGB  --  10.7*  --   --  11.4*   MCV  --  92  --   --  90   PLT  --  196  --   --  230   INR  --  4.97*  --   --  4.08*   NA  --  140  --   --  138   POTASSIUM  --  3.5  --   --  3.9   CHLORIDE  --  101  --   --  99   CO2  --  27  --   --  28   BUN  --  51.0*  --   --  55.9*   CR  --  1.14*  --   --  1.29*   ANIONGAP  --  12  --   --  11   ZAKIA  --  9.9  --   --  10.7*   * 113* 95   < > 162*   ALBUMIN  --   --   --   --  3.1*   PROTTOTAL  --   --   --   --  6.7   BILITOTAL  --   --   --   --  0.6   ALKPHOS  --   --   --   --  82   ALT  --   --   --    --  43   AST  --   --   --   --  90*    < > = values in this interval not displayed.           Recent Results (from the past 24 hour(s))   XR Chest Port 1 View    Narrative    EXAM: XR CHEST PORT 1 VIEW  LOCATION: [facility]  DATE: 9/22/2023    INDICATION: weakness, elevated WBC, unclear source   rule out pneumonia  COMPARISON: 05/26/2023      Impression    IMPRESSION: Heart size is normal. Lungs are clear bilaterally. Mediastinum and visualized bony structures are stable in appearance. Interval removal of right-sided PICC line.

## 2023-09-23 NOTE — PROGRESS NOTES
09/23/23 1100   Appointment Info   Signing Clinician's Name / Credentials (PT) Gurpreet Jang, PT   Living Environment   People in Home alone   Current Living Arrangements apartment   Home Accessibility no concerns   Transportation Anticipated public transportation   Living Environment Comments has elevator at apartment complex, but reports she may not be returning   Self-Care   Equipment Currently Used at Home walker, rolling;wheelchair, manual;shower chair;raised toilet seat   Fall history within last six months yes   Number of times patient has fallen within last six months 3   Activity/Exercise/Self-Care Comment uses mostly a manual w/c but also has taken short distances with a walker, has daughter who helps with shopping/cooking   General Information   Onset of Illness/Injury or Date of Surgery 09/22/23   Referring Physician Dr. Hopper   Patient/Family Therapy Goals Statement (PT) to return home safely   Pertinent History of Current Problem (include personal factors and/or comorbidities that impact the POC) 78 year old lady w/ hx of HTN, CKD III, HLD, Vit D def, obesity, pAfib on coumadin, DM2, venous stasis brought in by police after welfare check found to have leg swelling and redness concerning for cellulitis.   Existing Precautions/Restrictions fall   General Observations unsure of WB precautions   Cognition   Affect/Mental Status (Cognition) agitated   Orientation Status (Cognition) oriented x 4   Pain Assessment   Patient Currently in Pain Yes, see Vital Sign flowsheet   Integumentary/Edema   Integumentary/Edema other (describe)   Integumentary/Edema Comments both legs wrapped   Posture    Posture Forward head position;Kyphosis   Range of Motion (ROM)   Range of Motion ROM is WFL   Strength (Manual Muscle Testing)   Strength (Manual Muscle Testing) Able to perform R SLR;Able to perform L SLR   Bed Mobility   Bed Mobility supine-sit;sit-supine   Supine-Sit Hood (Bed Mobility) moderate assist (50%  patient effort)   Sit-Supine Amonate (Bed Mobility) moderate assist (50% patient effort)   Bed Mobility Limitations impaired ability to control trunk for mobility   Impairments Contributing to Impaired Bed Mobility decreased strength   Assistive Device (Bed Mobility) bed rails   Transfers   Transfers other (see comments)   Comment, (Transfers) not attempted due to pain and WB status in at L heel   Gait/Stairs (Locomotion)   Amonate Level (Gait) not tested   Balance   Balance other (describe)   Sitting Balance: Static fair balance   Balance Quick Add Sitting balance: Static   Clinical Impression   Criteria for Skilled Therapeutic Intervention Yes, treatment indicated   PT Diagnosis (PT) impaired functional mobility   Influenced by the following impairments pain, weakness   Functional limitations due to impairments bed mobility, transfers, gait   Clinical Presentation (PT Evaluation Complexity) Evolving/Changing   Clinical Presentation Rationale clinical judgement   Clinical Decision Making (Complexity) moderate complexity   Planned Therapy Interventions (PT) balance training;bed mobility training;gait training;home exercise program;neuromuscular re-education;ROM (range of motion);strengthening;transfer training;progressive activity/exercise   Risk & Benefits of therapy have been explained evaluation/treatment results reviewed;care plan/treatment goals reviewed;risks/benefits reviewed;current/potential barriers reviewed;participants voiced agreement with care plan;participants included;patient   Clinical Impression Comments Patient's current status is difficult to determine due to inability/pain when placing weight through her LLE. Currently, it is recommended patient is a lift assist.   PT Total Evaluation Time   PT Mayra Moderate Complexity Minutes (71099) 15   Physical Therapy Goals   PT Frequency 5x/week   PT Predicted Duration/Target Date for Goal Attainment 09/30/23   PT Goals Bed  Mobility;Transfers;Gait   PT: Bed Mobility Supervision/stand-by assist;Supine to/from sit   PT: Transfers Supervision/stand-by assist;Sit to/from stand;Bed to/from chair;Assistive device   PT: Gait Supervision/stand-by assist;Assistive device;10 feet   Interventions   Interventions Quick Adds Therapeutic Procedure   Therapeutic Procedure/Exercise   Ther. Procedure: strength, endurance, ROM, flexibillity Minutes (34646) 11   Symptoms Noted During/After Treatment increased pain;fatigue   Treatment Detail/Skilled Intervention Patient provided with and instructed in completion of an exercise program to improve ROM/strength. Exercises included: ankle pumps, SAQ/LAQ, quad/gluteal/HS sets, and SLR. Verbal/tactile cues given on appropriate form and pacing to maximize benefit.   PT Discharge Planning   PT Plan Sat 1/5: PT plan to work on bed mobilitty and attempt stand pivot transfers at next session   PT Discharge Recommendation (DC Rec) Transitional Care Facility   PT Rationale for DC Rec Rec is difficult to determine at this time but may benefit from a TCU in order to improve her strength to eventually be placed in a residential.   PT Brief overview of current status bed mobility assist x1   Total Session Time   Timed Code Treatment Minutes 11   Total Session Time (sum of timed and untimed services) 26

## 2023-09-23 NOTE — PHARMACY-VANCOMYCIN DOSING SERVICE
Pharmacy Vancomycin Initial Note  Date of Service 2023  Patient's  1945  78 year old, female    Indication: Skin and Soft Tissue Infection    Current estimated CrCl = CrCl cannot be calculated (Unknown ideal weight.).    Creatinine for last 3 days  2023:  7:41 PM Creatinine 1.29 mg/dL    Recent Vancomycin Level(s) for last 3 days  No results found for requested labs within last 3 days.      Vancomycin IV Administrations (past 72 hours)        No vancomycin orders with administrations in past 72 hours.                    Nephrotoxins and other renal medications (From now, onward)      Start     Dose/Rate Route Frequency Ordered Stop    23 0200  vancomycin (VANCOCIN) 1,000 mg in 200 mL dextrose intermittent infusion        Note to Pharmacy: Please dose vanc per renal fxn    1,000 mg  200 mL/hr over 1 Hours Intravenous EVERY 24 HOURS 23 0146              Contrast Orders - past 72 hours (72h ago, onward)      None            [employer] Prediction of Planned Initial Vancomycin Regimen  Loading dose: N/A  Regimen: 1000 mg IV every 24 hours.  Start time: 20:32 on 2023  Exposure target: AUC24 (range)400-600 mg/L.hr   AUC24,ss: 403 mg/L.hr  Probability of AUC24 > 400: 51 %  Ctrough,ss: 12.8 mg/L  Probability of Ctrough,ss > 20: 21 %  Probability of nephrotoxicity (Lodise COSTA ): 8 %          Plan:  Start vancomycin  1000 mg IV q24h.   Vancomycin monitoring method: AUC  Vancomycin therapeutic monitoring goal: 400-600 mg*h/L  Pharmacy will check vancomycin levels as appropriate in 1-3 Days.    Serum creatinine levels will be ordered daily for the first week of therapy and at least twice weekly for subsequent weeks.      Stephanie Ventura, [facility]    **ADDENDUM** 23 1549  Weight was inaccurate on admission, adjusting dose to 1250mg q24h to maintain pAUC > 70%    Regimen: 1250 mg IV every 24 hours.  Start time: 15:25 on 2023  Exposure target: AUC24 (range)400-600 mg/L.hr    AUC24,ss: 584 mg/L.hr  Probability of AUC24 > 400: 79 %  Ctrough,ss: 16.6 mg/L  Probability of Ctrough,ss > 20: 38 %  Probability of nephrotoxicity (Lodise COSTA 2009): 12 %    Lavon Patel, [facility]

## 2023-09-23 NOTE — PROGRESS NOTES
Admitting nurse completed full skin assessment, Suresh, score and Suresh interventions. This writer agrees with the initial skin assessment findings.

## 2023-09-23 NOTE — H&P
[facility]    History and Physical - Hospitalist Service       Date of Admission:  9/22/2023    Assessment & Plan      Linda Loredo is a 78 year old lady w/ hx of HTN, CKD III, HLD, Vit D def, obesity, pAfib on coumadin, DM2, venous stasis brought in by police after welfare check found to have leg swelling and redness concerning for cellulitis     # Cellulitis  # Eschar wound / Diabetic ulcer  # Leukocytosis  # Elevated CRP  # Venous stasis  - cont w/ CTX for now; add vanc  - due to limitations of telemedicine, a thorough skin exam was not able to be performed - pt will need a comprehensive skin assessment for pressure ulcers, eval of cellulitis, diabetic wounds   - wound care   - check MRSA   - check procalcitonin   - check duplex     # DM2  - ISS for now / accuchecks / ADA diet    # CKD III  # Hypercalcemia   - monitor    # pAfib  # Supratherapeutic INR  Ventricular rate controlled  - check INR  - hold coumadin    # HTN  BP stable     PT evaluation    Medication reconciliation will need to be completed on this patient.        Diet: Moderate Consistent Carb (60 g CHO per Meal) Diet    DVT Prophylaxis: HOLD coumadin  Braga Catheter: Not present  Lines: None     Code Status: Full Code      Clinically Significant Risk Factors Present on Admission           # Hypercalcemia: Highest Ca = 10.7 mg/dL in last 2 days, will monitor as appropriate    # Hypoalbuminemia: Lowest albumin = 3.1 g/dL at 9/22/2023  7:41 PM, will monitor as appropriate  # Drug Induced Coagulation Defect: home medication list includes an anticoagulant medication    # Hypertension: Noted on problem list     # DMII: A1C = N/A within past 6 months               Expected discharge: >2 days.  Recommended to prior living arrangement once acute medical condition stabilized.   The patient's care was discussed with the Bedside RN      Sherwin Sepulveda MD  [facility]  Securely message with the  [employer] Web Console (learn more here)  Text page via [facility] Paging/Directory        Visit/Communication Style   Virtual (Video) communication was used to evaluate Jim.  Jim consented to the use of video communication: yes  Patient's location: [facility]   Provider's location during the visit: remote [place] [facility] site        ______________________________________________________________________    Chief Complaint   Brought in by Police     History of Present Illness   Linda Loredo is a 78 year old lady w/ hx of HTN, CKD III, HLD, Vit D def, obesity, pAfib on coumadin, DM2, venous stasis brought in by police after welfare check. Pt found on the floor, covered in excrement. History was limited as pt was lethargic and falling asleep. Hx was primarily obtained by the ER notes. Pt reports profound weakness erich of the legs w/ b/l leg apin.     Pt denies any complaints at this time - no fever, chills, cp, shortness of breath, abdominal pain, urinary / bowel complaints. In the ER, Labs resulted w/ leukocytosis of 14.1 with left shift.  INR supratherapeutic at 4.08.  CRP elevated 86.  Creatinine elevated to 1.29, BUN 55.9 and calcium 10.7.- started on CTX.     Review of Systems    All other systems were reviewed and found to be negative except as mentioned in the HPI    Past Medical History    I have reviewed this patient's medical history and updated it with pertinent information if needed.   Past Medical History:   Diagnosis Date     Depressive disorder, not elsewhere classified      Herpes zoster without mention of complication      Hypercalcemia 2/24/2007     Mild intermittent asthma      Other urinary incontinence      Type II or unspecified type diabetes mellitus with renal manifestations, uncontrolled(250.42) (H)      Type II or unspecified type diabetes mellitus without mention of complication, not stated as uncontrolled      Unspecified essential hypertension         Past Surgical History   I have reviewed this patient's surgical history and updated it with pertinent information if needed.  Past Surgical History:   Procedure Laterality Date     CHOLECYSTECTOMY       ligation of fallopian tube       OPEN REDUCTION INTERNAL FIXATION ANKLE  10/13/11    left     pinning of left 2nd toe         Social History   I have reviewed this patient's social history and updated it with pertinent information if needed.  Social History     Tobacco Use     Smoking status: Never     Smokeless tobacco: Never   Substance Use Topics     Alcohol use: No     Drug use: No       Family History   I have reviewed this patient's family history and updated it with pertinent information if needed.  Family History   Problem Relation Age of Onset     Hypertension Mother      Heart Disease Father         heart attack and cardiomegaly     Diabetes Sister         type 2     Hypertension Sister      Respiratory Sister      Psychotic Disorder Sister         bipolar     Cancer Maternal Grandmother         throat     Cancer Paternal Grandmother         gastric       Prior to Admission Medications   Prior to Admission Medications   Prescriptions Last Dose Informant Patient Reported? Taking?   INSULIN PUMP ACCESSORIES [facility]  Nursing Home No No   Sig: as directed   Patient not taking: Reported on 5/26/2023   ONE TOUCH TEST STRIPS TEST     Nursing Home No No   Sig: use as directed   Patient taking differently: 1 strip by In Vitro route 4 times daily ACHS   Warfarin Therapy Reminder  Nursing Home Yes No   Sig: Take 1 each by mouth See Admin Instructions 6-16-23: 1.25mg daily. Recheck INR 6/19   acetaminophen (TYLENOL) 325 MG tablet   No No   Sig: Take 2 tablets (650 mg) by mouth every 6 hours as needed for mild pain or other (and adjunct with moderate or severe pain or per patient request)   famotidine (PEPCID) 20 MG tablet   Yes No   Sig: Take 20 mg by mouth daily   insulin aspart (NOVOLOG PEN) 100 UNIT/ML pen   No  No   Sig: Inject 1-7 Units Subcutaneous 3 times daily (before meals) Correction Scale - MEDIUM INSULIN RESISTANCE DOSING     Do Not give Correction Insulin if Pre-Meal BG less than 140.   For Pre-Meal  - 189 give 1 unit.   For Pre-Meal  - 239 give 2 units.   For Pre-Meal  - 289 give 3 units.   For Pre-Meal  - 339 give 4 units.   For Pre-Meal - 399 give 5 units.   For Pre-Meal -449 give 6 units  For Pre-Meal BG greater than or equal to 450 give 7 units.   To be given with prandial insulin, and based on pre-meal blood glucose.    Notify provider if glucose greater than or equal to 350 mg/dL after administration of correction dose.  If given at mealtime, administer within 30 minutes of start of meal.   insulin aspart (NOVOLOG PEN) 100 UNIT/ML pen   No No   Sig: Inject 1-5 Units Subcutaneous At Bedtime MEDIUM INSULIN RESISTANCE DOSING    Do Not give Bedtime Correction Insulin if BG less than  200.   For  - 249 give 1 units.   For  - 299 give 2 units.   For  - 349 give 3 units.   For  -399 give 4 units.   For BG greater than or equal to 400 give 5 units.  Notify provider if glucose greater than or equal to 350 mg/dL after administration of correction dose.  If given at mealtime, administer within 30 minutes of start of meal.   lisinopril (ZESTRIL) 10 MG tablet  Nursing Home Yes No   Sig: Take 5 mg by mouth daily   metFORMIN (GLUCOPHAGE XR) 500 MG 24 hr tablet  Nursing Home Yes No   Sig: Take 2,000 mg by mouth daily (with dinner)   methadone (DOLOPHINE) 10 MG tablet   No No   Sig: Take 1 tablet (10 mg) by mouth daily At 1500.  Take in addition to the 5 mg three times daily dose   methadone (DOLOPHINE) 5 MG tablet   No No   Sig: Take 1 tablet (5 mg) by mouth 3 times daily At 0800, 1200 and 2100.  Take in addition to the 10 mg tablet at 1500.   miconazole (MICATIN) 2 % external powder   No No   Sig: Apply topically 2 times daily   multivitamin w/minerals  (THERA-VIT-M) tablet   Yes No   Sig: Take 1 tablet by mouth daily   naloxone (NARCAN) 4 MG/0.1ML nasal spray   No No   Sig: Spray 1 spray (4 mg) into one nostril alternating nostrils as needed for opioid reversal every 2-3 minutes until assistance arrives   ondansetron (ZOFRAN ODT) 4 MG ODT tab   No No   Sig: Take 1 tablet (4 mg) by mouth every 6 hours as needed for nausea or vomiting   pentoxifylline ER (TRENTAL) 400 MG CR tablet  Nursing Home Yes No   Sig: Take 400 mg by mouth 3 times daily (with meals)   simvastatin (ZOCOR) 20 MG tablet  Nursing Home Yes No   Sig: Take 20 mg by mouth At Bedtime   torsemide (DEMADEX) 10 MG tablet  Nursing Home Yes No   Sig: Take 10 mg by mouth 2 times daily      Facility-Administered Medications: None     Allergies   Allergies   Allergen Reactions     Prednisone Nausea and Vomiting     Morphine Nausea and Vomiting     Morphine [Fumaric Acid] Nausea and Vomiting     Seasonal Allergies Difficulty breathing       Physical Exam   Vital Signs: Temp: 98.7  F (37.1  C) Temp src: Oral BP: 109/62 Pulse: 81   Resp: 12 SpO2: (!) 86 % O2 Device: Nasal cannula    Weight: 0 lbs 0 oz    Gen:  Well-developed, well-nourished, in no acute distress, lying semi-supine in hospital stretcher  HEENT:  Anicteric sclera, PER, hearing intact to voice  Resp:  No accessory muscle use, breath sounds clear; no wheezes no rales no rhonchi  Card:  No murmur, normal S1, S2   Abd:  Soft per RN exam, no TTP, non-distended, normoactive bowel sounds are present  Musc:  Normal strength and movement of the major muscle groups without obvious deformity  Psych:  Good insight, oriented to person, place and time, not anxious, not agitated        Data     Recent Labs   Lab 09/22/23 2221 09/22/23  1941   WBC  --  14.1*   HGB  --  11.4*   MCV  --  90   PLT  --  230   INR  --  4.08*   NA  --  138   POTASSIUM  --  3.9   CHLORIDE  --  99   CO2  --  28   BUN  --  55.9*   CR  --  1.29*   ANIONGAP  --  11   ZAKIA  --  10.7*   GLC  116* 162*   ALBUMIN  --  3.1*   PROTTOTAL  --  6.7   BILITOTAL  --  0.6   ALKPHOS  --  82   ALT  --  43   AST  --  90*         Recent Results (from the past 24 hour(s))   XR Chest Port 1 View    Narrative    EXAM: XR CHEST PORT 1 VIEW  LOCATION: [facility]  DATE: 9/22/2023    INDICATION: weakness, elevated WBC, unclear source   rule out pneumonia  COMPARISON: 05/26/2023      Impression    IMPRESSION: Heart size is normal. Lungs are clear bilaterally. Mediastinum and visualized bony structures are stable in appearance. Interval removal of right-sided PICC line.

## 2023-09-23 NOTE — MEDICATION SCRIBE - ADMISSION MEDICATION HISTORY
Medication Scribe Admission Medication History    Admission medication history is complete. The information provided in this note is only as accurate as the sources available at the time of the update.    Medication reconciliation/reorder completed by provider prior to medication history? Yes, just a few.    Information Source(s): Patient, Clinic records, and CareEverywhere/SureScripts via  in room with patient and per med bottles patient brought in with her.    Pertinent Information: Patient has only taken 4 doses of the Percocet since she got it.  States it doesn't work for her pain.  Has a bottle of torsemide 20 mg tabs from 1/9/22 besides the 10 mg tabs.  Recently prescribed Co Q10 on 9/12/2023 but never filled it as she wanted to talk it over with some one first?  Checks blood sugar once a day now since not on Insulin pump.  Has 5 mg Lisinopril tabs at home, not 10 mg tabs.    Changes made to PTA medication list:  Added: Semaglutide, Warfarin 2.5 mg tab, Tresiba, Percocet 5-235 mg, Vitamin C 500 mg.  Deleted: Famotidine 20 mg, Novolog insulin, Lantus Insulin, Insulin pump accessories, Miconazole 2%, MVI, Ondansetron 4 mg, Pentoxifylline 400 mg, Warfarin Therapy Reminder.  Changed: Torsemide from 10 mg bid to 15 mg bid.      Medication Affordability:  Not including over the counter (OTC) medications, was there a time in the past 3 months when you did not take your medications as prescribed because of cost?: No    Allergies reviewed with patient and updates made in EHR: yes, no change.    Medication History Completed By: Marianela Lancaster 9/23/2023 1:33 PM    Prior to Admission medications    Medication Sig Last Dose Taking? Auth Provider Long Term End Date   acetaminophen (TYLENOL) 325 MG tablet Take 2 tablets (650 mg) by mouth every 6 hours as needed for mild pain or other (and adjunct with moderate or severe pain or per patient request) never uses at unknown Yes Velma Coles MD     Vitamin C  (ASCORBIC ACID) 500 MG tablet Take 1 tablet by mouth daily 9/21/2023 at am Yes      insulin degludec (TRESIBA) 200 UNIT/ML pen Inject 35 Units Subcutaneous At Bedtime Around 11-11:30 pm 9/21/2023 at hs Yes Reported, Patient     lisinopril (ZESTRIL) 10 MG tablet Take 5 mg by mouth daily 9/21/2023 at morning Yes Reported, Patient     metFORMIN (GLUCOPHAGE XR) 500 MG 24 hr tablet Take 2,000 mg by mouth daily (with dinner) 9/21/2023 at supper Yes Reported, Patient     methadone (DOLOPHINE) 10 MG tablet Take 1 tablet (10 mg) by mouth daily At 1500.  Take in addition to the 5 mg three times daily dose 9/21/2023 at 1500 Yes Velma Colse MD No    methadone (DOLOPHINE) 5 MG tablet Take 1 tablet (5 mg) by mouth 3 times daily At 0800, 1200 and 2100.  Take in addition to the 10 mg tablet at 1500. 9/21/2023 at am Yes Vickie Ventura NP No    naloxone (NARCAN) 4 MG/0.1ML nasal spray Spray 1 spray (4 mg) into one nostril alternating nostrils as needed for opioid reversal every 2-3 minutes until assistance arrives never used at on hand if needed Yes Velma Coles MD Yes    ONE TOUCH TEST STRIPS TEST   VI use as directed  Patient taking differently: 1 strip by In Vitro route daily ACHS Past Week at pm Yes Eloisa Maloney MD     oxyCODONE-acetaminophen (PERCOCET) 5-325 MG tablet Take 1-2 tablets by mouth every 6 hours as needed (leg pain) Past Week at am Yes Reported, Patient     OZEMPIC, 0.25 OR 0.5 MG/DOSE, 2 MG/3ML pen Inject 0.5 mg Subcutaneous once a week On Monday evenings 9/18/2023 at pm Yes Reported, Patient     simvastatin (ZOCOR) 20 MG tablet Take 20 mg by mouth At Bedtime 9/21/2023 at hs Yes Reported, Patient     torsemide (DEMADEX) 10 MG tablet Take 15 mg by mouth 2 times daily 9/21/2023 at pm Yes Reported, Patient     warfarin ANTICOAGULANT (COUMADIN) 2.5 MG tablet 21 - 3.75 mg  22 - 3.75 mg  23 - 2.5 mg  24 - 3.75 mg  25 - 2.5 mg  26 - 3.75 mg  27 - 2.5 mg  28 - 3.75 mg 9/21/2023 at supper  Yes Reported, Patient

## 2023-09-23 NOTE — PROGRESS NOTES
"Note left for physician to order WOC consult.  Bed Cradle attached to bed.  Bilat. LE'S elevated on pillows  Aquaphor applied to bottom and Bilat. LE  Nasal Swabs obtained and sent to lab.  Patient too sleepy to admit at this time, [employer] had difficult time with interviewing questions.  \"I am so tired.\"  Vancomycin  started.  [place] placed  "

## 2023-09-24 LAB
CREAT SERPL-MCNC: 1.13 MG/DL (ref 0.51–0.95)
EGFRCR SERPLBLD CKD-EPI 2021: 50 ML/MIN/1.73M2
GLUCOSE BLDC GLUCOMTR-MCNC: 141 MG/DL (ref 70–99)
GLUCOSE BLDC GLUCOMTR-MCNC: 151 MG/DL (ref 70–99)
GLUCOSE BLDC GLUCOMTR-MCNC: 158 MG/DL (ref 70–99)
GLUCOSE BLDC GLUCOMTR-MCNC: 180 MG/DL (ref 70–99)
GLUCOSE BLDC GLUCOMTR-MCNC: 200 MG/DL (ref 70–99)
HOLD SPECIMEN: NORMAL
INR PPP: 2.18 (ref 0.85–1.15)

## 2023-09-24 PROCEDURE — 36415 COLL VENOUS BLD VENIPUNCTURE: CPT | Performed by: STUDENT IN AN ORGANIZED HEALTH CARE EDUCATION/TRAINING PROGRAM

## 2023-09-24 PROCEDURE — 250N000013 HC RX MED GY IP 250 OP 250 PS 637: Performed by: HOSPITALIST

## 2023-09-24 PROCEDURE — 85610 PROTHROMBIN TIME: CPT | Performed by: STUDENT IN AN ORGANIZED HEALTH CARE EDUCATION/TRAINING PROGRAM

## 2023-09-24 PROCEDURE — 250N000013 HC RX MED GY IP 250 OP 250 PS 637: Performed by: STUDENT IN AN ORGANIZED HEALTH CARE EDUCATION/TRAINING PROGRAM

## 2023-09-24 PROCEDURE — 36415 COLL VENOUS BLD VENIPUNCTURE: CPT | Performed by: HOSPITALIST

## 2023-09-24 PROCEDURE — 258N000003 HC RX IP 258 OP 636: Performed by: STUDENT IN AN ORGANIZED HEALTH CARE EDUCATION/TRAINING PROGRAM

## 2023-09-24 PROCEDURE — 120N000001 HC R&B MED SURG/OB

## 2023-09-24 PROCEDURE — 250N000011 HC RX IP 250 OP 636: Mod: JZ | Performed by: HOSPITALIST

## 2023-09-24 PROCEDURE — 82565 ASSAY OF CREATININE: CPT | Performed by: HOSPITALIST

## 2023-09-24 PROCEDURE — 250N000011 HC RX IP 250 OP 636: Performed by: STUDENT IN AN ORGANIZED HEALTH CARE EDUCATION/TRAINING PROGRAM

## 2023-09-24 RX ORDER — LISINOPRIL 5 MG/1
5 TABLET ORAL DAILY
Status: DISCONTINUED | OUTPATIENT
Start: 2023-09-24 | End: 2023-09-29 | Stop reason: HOSPADM

## 2023-09-24 RX ORDER — ASCORBIC ACID 500 MG
500 TABLET ORAL DAILY
Status: DISCONTINUED | OUTPATIENT
Start: 2023-09-24 | End: 2023-09-29 | Stop reason: HOSPADM

## 2023-09-24 RX ORDER — WARFARIN SODIUM 7.5 MG
3.75 TABLET ORAL
Status: COMPLETED | OUTPATIENT
Start: 2023-09-24 | End: 2023-09-24

## 2023-09-24 RX ORDER — TORSEMIDE 5 MG/1
15 TABLET ORAL 2 TIMES DAILY
Status: DISCONTINUED | OUTPATIENT
Start: 2023-09-24 | End: 2023-09-29 | Stop reason: HOSPADM

## 2023-09-24 RX ORDER — SIMVASTATIN 20 MG
20 TABLET ORAL AT BEDTIME
Status: DISCONTINUED | OUTPATIENT
Start: 2023-09-24 | End: 2023-09-29 | Stop reason: HOSPADM

## 2023-09-24 RX ADMIN — INSULIN ASPART 1 UNITS: 100 INJECTION, SOLUTION INTRAVENOUS; SUBCUTANEOUS at 16:50

## 2023-09-24 RX ADMIN — WHITE PETROLATUM: 1.75 OINTMENT TOPICAL at 11:19

## 2023-09-24 RX ADMIN — OXYCODONE HYDROCHLORIDE AND ACETAMINOPHEN 500 MG: 500 TABLET ORAL at 12:20

## 2023-09-24 RX ADMIN — TORSEMIDE 15 MG: 5 TABLET ORAL at 21:28

## 2023-09-24 RX ADMIN — METHADONE HYDROCHLORIDE 10 MG: 5 TABLET ORAL at 15:13

## 2023-09-24 RX ADMIN — METHADONE HYDROCHLORIDE 5 MG: 5 TABLET ORAL at 06:43

## 2023-09-24 RX ADMIN — VANCOMYCIN HYDROCHLORIDE 1250 MG: 5 INJECTION, POWDER, LYOPHILIZED, FOR SOLUTION INTRAVENOUS at 02:21

## 2023-09-24 RX ADMIN — CEFTRIAXONE SODIUM 1 G: 1 INJECTION, POWDER, FOR SOLUTION INTRAMUSCULAR; INTRAVENOUS at 21:30

## 2023-09-24 RX ADMIN — METHADONE HYDROCHLORIDE 5 MG: 5 TABLET ORAL at 21:28

## 2023-09-24 RX ADMIN — TORSEMIDE 15 MG: 5 TABLET ORAL at 11:26

## 2023-09-24 RX ADMIN — SIMVASTATIN 20 MG: 20 TABLET, FILM COATED ORAL at 21:28

## 2023-09-24 RX ADMIN — METHADONE HYDROCHLORIDE 5 MG: 5 TABLET ORAL at 11:27

## 2023-09-24 RX ADMIN — LISINOPRIL 5 MG: 5 TABLET ORAL at 12:20

## 2023-09-24 RX ADMIN — WARFARIN SODIUM 3.75 MG: 7.5 TABLET ORAL at 19:07

## 2023-09-24 RX ADMIN — INSULIN ASPART 1 UNITS: 100 INJECTION, SOLUTION INTRAVENOUS; SUBCUTANEOUS at 08:17

## 2023-09-24 RX ADMIN — INSULIN ASPART 1 UNITS: 100 INJECTION, SOLUTION INTRAVENOUS; SUBCUTANEOUS at 12:20

## 2023-09-24 ASSESSMENT — ACTIVITIES OF DAILY LIVING (ADL)
ADLS_ACUITY_SCORE: 38
ADLS_ACUITY_SCORE: 42
ADLS_ACUITY_SCORE: 38
ADLS_ACUITY_SCORE: 34
ADLS_ACUITY_SCORE: 38
DEPENDENT_IADLS:: CLEANING;COOKING;LAUNDRY;SHOPPING;MEAL PREPARATION;TRANSPORTATION
ADLS_ACUITY_SCORE: 38
ADLS_ACUITY_SCORE: 38
ADLS_ACUITY_SCORE: 42
ADLS_ACUITY_SCORE: 38
ADLS_ACUITY_SCORE: 38

## 2023-09-24 NOTE — PROGRESS NOTES
"Pt A & O x 4, pain 6-7/10, on room air with O2 in upper 90's, able to make needs known, bed rest, strict I/O, carb count, PIV in L upper arm, flushed and intermittent IV antibiotics, removed pure wick due to skin breakdown, no brief in bed, using chux under bottom to allow for better air flow, heels floating, dressing changed, mepilex on coccyx, giselle sreekanth lotion used with soft disposable clothe, barrier cream applied, new inter dry in abdominal fold, pt non compliment with rotating due to pain and personal comfort, /45   Pulse 76   Temp 98.1  F (36.7  C) (Oral)   Resp 18   Ht 1.676 m (5' 6\")   Wt 120.5 kg (265 lb 10.5 oz)   SpO2 100%   BMI 42.88 kg/m     "

## 2023-09-24 NOTE — PHARMACY-ANTICOAGULATION SERVICE
Clinical Pharmacy - Warfarin Dosing Consult     Pharmacy has been consulted to manage this patient s warfarin therapy.  Indication: Atrial Fibrillation  Therapy Goal: INR 2-3  Provider/Team: [facility]  Warfarin Prior to Admission: Yes  Warfarin PTA Regimen: 2.5mg Mon,Wed,Fri; 3.75mg rest of week  Recent documented change in oral intake/nutrition: Unknown    INR   Date Value Ref Range Status   09/24/2023 2.18 (H) 0.85 - 1.15 Final   09/23/2023 4.97 (H) 0.85 - 1.15 Final       Recommend warfarin 3.75 mg today.  Pharmacy will monitor Linda Loredo daily and order warfarin doses to achieve specified goal.      Please contact pharmacy as soon as possible if the warfarin needs to be held for a procedure or if the warfarin goals change.       Lor Mireles, PharmD

## 2023-09-24 NOTE — PLAN OF CARE
Goal Outcome Evaluation:      Plan of Care Reviewed With: patient      Awaiting WOC consult Monday, 9/25.

## 2023-09-24 NOTE — PROGRESS NOTES
"[facility]    History and Physical - Hospitalist Service       Date of Admission:  9/22/2023    Assessment & Plan      Lindachong Loredo is a 78 year old lady w/ hx of HTN, CKD III, HLD, Vit D def, obesity, pAfib on coumadin, DM2, venous stasis brought in by police after welfare check found to have leg swelling and redness concerning for cellulitis.      This am;  Patient looks well. Comfortable. No c/o.    # likley chronic venous statis more than Cellulitis.  #Multiple bodily ulcers  # Nasal MRSA +VE  - cont on Vanco and ceftriaxone  - WOC cosult  -bilateral doppler legs; no DVT  - wound care   - Prescribe methadone  - TCU    # DM2  - ISS for now / accuchecks / ADA diet    # CKD III  # Hypercalcemia   - monitor    # pAfib  # Supratherapeutic INR  Ventricular rate controlled  - check INR  - hold coumadin    # HTN  BP stable     PT/OT evaluation;  Patient apartment is condemned so she will need discharge plan    Medication reconciliation will need to be completed on this patient.        Diet: Moderate Consistent Carb (60 g CHO per Meal) Diet    DVT Prophylaxis: HOLD coumadin  Braga Catheter: Not present  Lines: None     Code Status: Full Code      Clinically Significant Risk Factors           # Hypercalcemia: Highest Ca = 10.7 mg/dL in last 2 days, will monitor as appropriate    # Hypoalbuminemia: Lowest albumin = 3.1 g/dL at 9/22/2023  7:41 PM, will monitor as appropriate  # Coagulation Defect: INR = 4.97 (Ref range: 0.85 - 1.15) and/or PTT = N/A, will monitor for bleeding      # Hypertension: Noted on problem list         # DMII: A1C = 7.0 % (Ref range: <5.7 %) within past 6 months  , PRESENT ON ADMISSION  # Severe Obesity: Estimated body mass index is 42.88 kg/m  as calculated from the following:    Height as of this encounter: 1.676 m (5' 6\").    Weight as of this encounter: 120.5 kg (265 lb 10.5 oz)., PRESENT ON ADMISSION            Expected discharge: >2 days.  Recommended to " prior living arrangement once acute medical condition stabilized.   The patient's care was discussed with the Bedside RN      Singh Hopper MD  [facility]  Securely message with the [employer] Web Console (learn more here)  Text page via [employer] Paging/Directory        Visit/Communication Style   Virtual (Video) communication was used to evaluate Jim.  Jim consented to the use of video communication: yes  Patient's location: [facility]   Provider's location during the visit: [facility] Tele[facility]med[facility] site        ______________________________________________________________________        Physical Exam   Vital Signs: Temp: 98.5  F (36.9  C) Temp src: Oral BP: 135/57 Pulse: 82   Resp: 18 SpO2: 96 % O2 Device: None (Room air)    Weight: 265 lbs 10.47 oz    Gen:  Well-developed, well-nourished, in no acute distress, lying semi-supine in hospital stretcher  HEENT:  Anicteric sclera, PER, hearing intact to voice  Resp:  No accessory muscle use, breath sounds clear; no wheezes no rales no rhonchi  Card:  No murmur, normal S1, S2   Abd:  Soft per RN exam, no TTP, non-distended, normoactive bowel sounds are present  Musc:  Normal strength and movement of the major muscle groups without obvious deformity  Psych:  Good insight, oriented to person, place and time, not anxious, not agitated        Data     Recent Labs   Lab 09/24/23  0757 09/24/23  0456 09/24/23  0151 09/23/23  2203 09/23/23  0759 09/23/23  0533 09/22/23  2221 09/22/23  1941   WBC  --   --   --   --   --  11.0  --  14.1*   HGB  --   --   --   --   --  10.7*  --  11.4*   MCV  --   --   --   --   --  92  --  90   PLT  --   --   --   --   --  196  --  230   INR  --   --   --   --   --  4.97*  --  4.08*   NA  --   --   --   --   --  140  --  138   POTASSIUM  --   --   --   --   --  3.5  --  3.9   CHLORIDE  --   --   --   --   --  101  --  99   CO2  --   --   --   --   --  27  --  28    BUN  --   --   --   --   --  51.0*  --  55.9*   CR  --  1.13*  --   --   --  1.14*  --  1.29*   ANIONGAP  --   --   --   --   --  12  --  11   ZAKIA  --   --   --   --   --  9.9  --  10.7*   *  --  158* 162*   < > 113*   < > 162*   ALBUMIN  --   --   --   --   --   --   --  3.1*   PROTTOTAL  --   --   --   --   --   --   --  6.7   BILITOTAL  --   --   --   --   --   --   --  0.6   ALKPHOS  --   --   --   --   --   --   --  82   ALT  --   --   --   --   --   --   --  43   AST  --   --   --   --   --   --   --  90*    < > = values in this interval not displayed.           No results found for this or any previous visit (from the past 24 hour(s)).

## 2023-09-24 NOTE — PROGRESS NOTES
"Reason for Admission:  History of atrial fibrillation    Activity:  Bedfast    Neuro: WDL    Cardiac: Apical pulse irregular       Resp: Diminished lung sounds.     GI/: Patient utilizing Purewick, will call to have placed when needed due to increased skin breakdown (abrasion present on labia).      Skin: Refer to flowsheet     Diet: Consistent carb, counting     IV Access/Drains: Saline locked     Vitals: /53 (Patient Position: Semi-Munguia's, Cuff Size: Adult Regular)   Pulse 80   Temp 98  F (36.7  C) (Oral)   Resp 18   Ht 1.676 m (5' 6\")   Wt 120.5 kg (265 lb 10.5 oz)   SpO2 100%   BMI 42.88 kg/m       Pain:  Patient states pain rating 6/10, scheduled methadone administered.      "

## 2023-09-24 NOTE — PROGRESS NOTES
"Pt alert, oriented, scheduled methadone for pain. On RA. Denies nausea, SOB. IV antibiotics given. Resting comfortably. Mepilex on coccyx. Inter dry in abdominal fold. Bedrest. Dressings to wounds clean, dry intact. Blood glucose monitoring and carb count.     /56   Pulse 77   Temp 98.2  F (36.8  C) (Oral)   Resp 18   Ht 1.676 m (5' 6\")   Wt 120.5 kg (265 lb 10.5 oz)   SpO2 99%   BMI 42.88 kg/m        "

## 2023-09-24 NOTE — CONSULTS
Care Management Initial Consult    General Information  Assessment completed with: Patient,    Type of CM/SW Visit: Initial Assessment    Primary Care Provider verified and updated as needed: Yes   Readmission within the last 30 days:        Reason for Consult: discharge planning  Advance Care Planning: Advance Care Planning Reviewed: no concerns identified        Communication Assessment  Patient's communication style: spoken language (English or Bilingual)    Hearing Difficulty or Deaf: no   Wear Glasses or Blind: yes    Cognitive  Cognitive/Neuro/Behavioral: WDL                      Living Environment:   People in home: alone     Current living Arrangements: apartment      Able to return to prior arrangements: no     Family/Social Support:  Care provided by: self, homecare agency, child(angy)  Provides care for: no one  Marital Status:   Children          Description of Support System: Supportive, Involved    Support Assessment: Lacks adequate physical care, Lacks necessary supervision and assistance    Current Resources:   Patient receiving home care services: Yes  Skilled Home Care Services: Skilled Nursing  Community Resources: County Worker, County Programs, Financial/Insurance, Home Care  Equipment currently used at home: wheelchair, manual, shower chair, raised toilet seat, grab bar, tub/shower, grab bar, toilet  Supplies currently used at home: Wound Care Supplies, Diabetic Supplies    Employment/Financial:  Employment Status: retired, disabled        Financial Concerns: No concerns identified     Does the patient's insurance plan have a 3 day qualifying hospital stay waiver?  No    Lifestyle & Psychosocial Needs:  Social Determinants of Health     Food Insecurity: Not on file   Depression: Not on file   Housing Stability: Not on file   Tobacco Use: Low Risk  (6/11/2023)    Patient History     Smoking Tobacco Use: Never     Smokeless Tobacco Use: Never     Passive Exposure: Not on file   Financial  "Resource Strain: Not on file   Alcohol Use: Not on file   Transportation Needs: Not on file   Physical Activity: Not on file   Interpersonal Safety: Not on file   Stress: Not on file   Social Connections: Not on file     Functional Status:  Prior to admission patient needed assistance:   Dependent ADLs:: Wheelchair-independent, Bathing, Dressing, Grooming, Incontinence  Dependent IADLs:: Cleaning, Cooking, Laundry, Shopping, Meal Preparation, Transportation     Mental Health Status:  Mental Health Status: No Current Concerns       Chemical Dependency Status:  Chemical Dependency Status: No Current Concerns           Values/Beliefs:  Spiritual, Cultural Beliefs, Gnosticism Practices, Values that affect care: no             Additional Information:    CM received referral for discharge planning. Completed EMR review and met with patient at bedside and introduced self and role.     Per EMR review from current hospital stay-- brought in by ambulance after welfare check performed by police.  Excerpt from law enforcement \"as I open the door I was hit with a putrid sense of fecal matter. pt was on the floor unable to get up or even get her pants on.  All the surfaces in her residence were cluttered and contains trash.  There is extrement on the floor in a different area of the resident that had not been cleaned up.      EMS came with form filled out by the PD regarding concern for unsafe living conditions. Pt reports that she has had multiple falls and that she has even needed to cut her pants when she has needed to clean her brief that she wears for incontinent bowel episodes.     Pt has open wounds/scabs on BLE and bottom of feet; extensive excoriated skin under abdominal folds, thighs, and buttocks; scabs and open wounds on back of thighs and rectum. During [facility], staff extracted maggots from vaginal region as well as from necrotic open wound on L heel.     Per ER RN: Spoke with Officer Stefan.  [place] Adult " Protections Services asked him to call the ER to give additional information.  Pt had a recent fall and was down for an unknown amount of time. Pt was found laying in her excrement. This pt does not have a safe place to return to. Her apartment has been condemned and deemed unlivable.       CM Met with patient at bedside and discussed the above information, discharge planning, and therapy recommendations for TCU. Patient was alert and oriented and very pleasant during our conversation. CM asked her if she knew that her home was potentially condemned. Patient states that she is aware however states that her home is not in the condition they say it is. Patient reports that her goal is to return to an independent living environment. Discussed TCU for rehab and wound care. She is reluctant to discharge to TCU as she has had a bad experience in the past. However, she is in agreement with referrals being sent.     Patient is current with [facility] ([phone] Fax: [phone]) for RN wound care services.     CM will follow up with [facility] tomorrow.     [facility] report filed: [id number]    PLAN: TCU- referrals pending    BEN STODDARD RN

## 2023-09-25 ENCOUNTER — APPOINTMENT (OUTPATIENT)
Dept: PHYSICAL THERAPY | Facility: CLINIC | Age: 78
DRG: 629 | End: 2023-09-25
Payer: COMMERCIAL

## 2023-09-25 ENCOUNTER — APPOINTMENT (OUTPATIENT)
Dept: OCCUPATIONAL THERAPY | Facility: CLINIC | Age: 78
DRG: 629 | End: 2023-09-25
Attending: STUDENT IN AN ORGANIZED HEALTH CARE EDUCATION/TRAINING PROGRAM
Payer: COMMERCIAL

## 2023-09-25 ENCOUNTER — APPOINTMENT (OUTPATIENT)
Dept: WOUND CARE | Facility: CLINIC | Age: 78
DRG: 629 | End: 2023-09-25
Payer: COMMERCIAL

## 2023-09-25 ENCOUNTER — APPOINTMENT (OUTPATIENT)
Dept: GENERAL RADIOLOGY | Facility: CLINIC | Age: 78
DRG: 629 | End: 2023-09-25
Attending: STUDENT IN AN ORGANIZED HEALTH CARE EDUCATION/TRAINING PROGRAM
Payer: COMMERCIAL

## 2023-09-25 LAB
CREAT SERPL-MCNC: 1.11 MG/DL (ref 0.51–0.95)
EGFRCR SERPLBLD CKD-EPI 2021: 51 ML/MIN/1.73M2
ERYTHROCYTE [DISTWIDTH] IN BLOOD BY AUTOMATED COUNT: 12.5 % (ref 10–15)
GLUCOSE BLDC GLUCOMTR-MCNC: 146 MG/DL (ref 70–99)
GLUCOSE BLDC GLUCOMTR-MCNC: 164 MG/DL (ref 70–99)
GLUCOSE BLDC GLUCOMTR-MCNC: 165 MG/DL (ref 70–99)
GLUCOSE BLDC GLUCOMTR-MCNC: 167 MG/DL (ref 70–99)
GLUCOSE BLDC GLUCOMTR-MCNC: 217 MG/DL (ref 70–99)
HCT VFR BLD AUTO: 29.3 % (ref 35–47)
HGB BLD-MCNC: 9.8 G/DL (ref 11.7–15.7)
INR PPP: 1.8 (ref 0.85–1.15)
MCH RBC QN AUTO: 31.2 PG (ref 26.5–33)
MCHC RBC AUTO-ENTMCNC: 33.4 G/DL (ref 31.5–36.5)
MCV RBC AUTO: 93 FL (ref 78–100)
PLATELET # BLD AUTO: 178 10E3/UL (ref 150–450)
RBC # BLD AUTO: 3.14 10E6/UL (ref 3.8–5.2)
WBC # BLD AUTO: 8.2 10E3/UL (ref 4–11)

## 2023-09-25 PROCEDURE — 99233 SBSQ HOSP IP/OBS HIGH 50: CPT | Mod: 57 | Performed by: PODIATRIST

## 2023-09-25 PROCEDURE — 258N000003 HC RX IP 258 OP 636: Performed by: STUDENT IN AN ORGANIZED HEALTH CARE EDUCATION/TRAINING PROGRAM

## 2023-09-25 PROCEDURE — 73630 X-RAY EXAM OF FOOT: CPT | Mod: RT

## 2023-09-25 PROCEDURE — 120N000001 HC R&B MED SURG/OB

## 2023-09-25 PROCEDURE — 250N000013 HC RX MED GY IP 250 OP 250 PS 637: Performed by: STUDENT IN AN ORGANIZED HEALTH CARE EDUCATION/TRAINING PROGRAM

## 2023-09-25 PROCEDURE — A6240 HYDROCOLLD DRG FILLER PASTE: HCPCS

## 2023-09-25 PROCEDURE — 97165 OT EVAL LOW COMPLEX 30 MIN: CPT | Mod: GO | Performed by: OCCUPATIONAL THERAPIST

## 2023-09-25 PROCEDURE — 97530 THERAPEUTIC ACTIVITIES: CPT | Mod: GP

## 2023-09-25 PROCEDURE — 85027 COMPLETE CBC AUTOMATED: CPT | Performed by: STUDENT IN AN ORGANIZED HEALTH CARE EDUCATION/TRAINING PROGRAM

## 2023-09-25 PROCEDURE — G0463 HOSPITAL OUTPT CLINIC VISIT: HCPCS | Mod: 25

## 2023-09-25 PROCEDURE — 36415 COLL VENOUS BLD VENIPUNCTURE: CPT | Performed by: HOSPITALIST

## 2023-09-25 PROCEDURE — 82565 ASSAY OF CREATININE: CPT | Performed by: HOSPITALIST

## 2023-09-25 PROCEDURE — 97602 WOUND(S) CARE NON-SELECTIVE: CPT

## 2023-09-25 PROCEDURE — 250N000013 HC RX MED GY IP 250 OP 250 PS 637: Performed by: HOSPITALIST

## 2023-09-25 PROCEDURE — 97530 THERAPEUTIC ACTIVITIES: CPT | Mod: GO | Performed by: OCCUPATIONAL THERAPIST

## 2023-09-25 PROCEDURE — 99232 SBSQ HOSP IP/OBS MODERATE 35: CPT | Performed by: STUDENT IN AN ORGANIZED HEALTH CARE EDUCATION/TRAINING PROGRAM

## 2023-09-25 PROCEDURE — A6260 WOUND CLEANSER ANY TYPE/SIZE: HCPCS

## 2023-09-25 PROCEDURE — 85610 PROTHROMBIN TIME: CPT | Performed by: STUDENT IN AN ORGANIZED HEALTH CARE EDUCATION/TRAINING PROGRAM

## 2023-09-25 PROCEDURE — 250N000011 HC RX IP 250 OP 636: Performed by: STUDENT IN AN ORGANIZED HEALTH CARE EDUCATION/TRAINING PROGRAM

## 2023-09-25 PROCEDURE — 87040 BLOOD CULTURE FOR BACTERIA: CPT | Performed by: STUDENT IN AN ORGANIZED HEALTH CARE EDUCATION/TRAINING PROGRAM

## 2023-09-25 PROCEDURE — 36415 COLL VENOUS BLD VENIPUNCTURE: CPT | Performed by: STUDENT IN AN ORGANIZED HEALTH CARE EDUCATION/TRAINING PROGRAM

## 2023-09-25 RX ORDER — WARFARIN SODIUM 2.5 MG/1
2.5 TABLET ORAL
Status: DISCONTINUED | OUTPATIENT
Start: 2023-09-25 | End: 2023-09-25

## 2023-09-25 RX ORDER — OXYCODONE HYDROCHLORIDE 5 MG/1
5 TABLET ORAL ONCE
Status: COMPLETED | OUTPATIENT
Start: 2023-09-25 | End: 2023-09-25

## 2023-09-25 RX ORDER — OXYCODONE HYDROCHLORIDE 5 MG/1
5 TABLET ORAL EVERY 6 HOURS PRN
Status: DISCONTINUED | OUTPATIENT
Start: 2023-09-25 | End: 2023-09-29 | Stop reason: HOSPADM

## 2023-09-25 RX ADMIN — METHADONE HYDROCHLORIDE 5 MG: 5 TABLET ORAL at 05:52

## 2023-09-25 RX ADMIN — SIMVASTATIN 20 MG: 20 TABLET, FILM COATED ORAL at 23:04

## 2023-09-25 RX ADMIN — ACETAMINOPHEN 650 MG: 325 TABLET, FILM COATED ORAL at 13:39

## 2023-09-25 RX ADMIN — TORSEMIDE 15 MG: 5 TABLET ORAL at 20:10

## 2023-09-25 RX ADMIN — INSULIN ASPART 1 UNITS: 100 INJECTION, SOLUTION INTRAVENOUS; SUBCUTANEOUS at 11:53

## 2023-09-25 RX ADMIN — INSULIN ASPART 1 UNITS: 100 INJECTION, SOLUTION INTRAVENOUS; SUBCUTANEOUS at 17:49

## 2023-09-25 RX ADMIN — LISINOPRIL 5 MG: 5 TABLET ORAL at 08:04

## 2023-09-25 RX ADMIN — OXYCODONE HYDROCHLORIDE 5 MG: 5 TABLET ORAL at 13:47

## 2023-09-25 RX ADMIN — METHADONE HYDROCHLORIDE 5 MG: 5 TABLET ORAL at 11:54

## 2023-09-25 RX ADMIN — METHADONE HYDROCHLORIDE 10 MG: 5 TABLET ORAL at 16:08

## 2023-09-25 RX ADMIN — TORSEMIDE 15 MG: 5 TABLET ORAL at 08:04

## 2023-09-25 RX ADMIN — OXYCODONE HYDROCHLORIDE AND ACETAMINOPHEN 500 MG: 500 TABLET ORAL at 08:04

## 2023-09-25 RX ADMIN — INSULIN ASPART 1 UNITS: 100 INJECTION, SOLUTION INTRAVENOUS; SUBCUTANEOUS at 08:05

## 2023-09-25 RX ADMIN — WHITE PETROLATUM: 1.75 OINTMENT TOPICAL at 14:19

## 2023-09-25 RX ADMIN — VANCOMYCIN HYDROCHLORIDE 1250 MG: 5 INJECTION, POWDER, LYOPHILIZED, FOR SOLUTION INTRAVENOUS at 02:01

## 2023-09-25 RX ADMIN — METHADONE HYDROCHLORIDE 5 MG: 5 TABLET ORAL at 20:09

## 2023-09-25 ASSESSMENT — ACTIVITIES OF DAILY LIVING (ADL)
ADLS_ACUITY_SCORE: 34
ADLS_ACUITY_SCORE: 34
ADLS_ACUITY_SCORE: 38
ADLS_ACUITY_SCORE: 34

## 2023-09-25 NOTE — PLAN OF CARE
Reason for Admission:  History of atrial fibrillation    Patient would like to discuss possible options with a WOC nurse and/or PT regarding her wheelchair fit, padding, best way to protect skin while in the wheelchair. Patient stated that she has spent quite a bit of money trying to find padding/alterations that provide effective protection and comfort. Per patient, large blister on left hand is due to trying to push herself in her wheelchair.     Activity:  Bedfast     Neuro: WDL     Cardiac: Apical pulse irregular                                       Resp: Diminished lung sounds.      GI/: Patient utilizing Purewick, will call to have placed when needed due to increased skin breakdown (abrasion present on labia).       Skin: Refer to flowsheet      Diet: Consistent carb, counting      IV Access/Drains: Saline locked      Pain:  Patient states pain rating 6/10, scheduled methadone administered and patient considers it effective enough.    Goal Outcome Evaluation:      Plan of Care Reviewed With: patient

## 2023-09-25 NOTE — H&P (VIEW-ONLY)
[facility] Podiatric Surgical Inpatient Consult    ASSESSMENT:    1.  Decubitus heel ulcer left  2.  Cellulitis left lower extremity  3.  Type II diabetes mellitus with peripheral neuropathy  4.  Venous stasis lower extremity.    RECOMMENDATIONS:    I have explained to Linda about the current condition involving the left foot.  We discussed the severity of the infection including the real possibility of loss of the lower leg if the infection spreads.  We also discussed the possible life threatening condition if the infection get into the blood stream.  At this point, I am recommending surgical debridement of the decubitus heel ulcer on the left foot.  The nature of the patient s condition was discussed at length.  I discussed with patient details of procedure(s), possible risks and complications, alternative treatment options and post-operative course detailed below.   The patient was educated today about risks associated with surgery.  Risks of surgery include, but are not limited to: infection, painful scar, nerve injury, numbness, stiffness, need for repeat surgery, recurrence of condition, ongoing pain, delayed wound healing, blood clot in the legs or lungs, amputation or other unforeseen side effects from undergoing surgery.       The patient was instructed to not smoke or use nicotine patches before and after the surgery as this could result in poor outcomes due to slower healing potentially.  Risks of DVT/PE were discussed in relation to anticipated level of immobilization, inactivity, injury, surgery, medications and personal risk factors.  Perioperative education was provided regarding signs and symptoms of a blood clot.  The patient was encouraged to be vigilant regarding symptoms and pursue risk reduction measures.  Based on patient history, procedure and post-operative plan, risk outweighs benefit of chemical prophylaxis therefore mechanical prophylaxis was encouraged.  Lower extremity range of  motion exercises are encouraged.   Postoperative pain regimens were discussed in great detail the patient.  The risks and benefits of non-narcotic and narcotic pain medications, as well as synergistic agents was also discussed.  The patient will be prescribed Oxycodone for more severe breakthrough pain not relieved by scheduled Tylenol.  The patient was also encouraged to rest, elevate and ice postoperatively to help with swelling and pain control.  The patient was in agreement with this plan.  The patient understands that they would need to remain nonweightbearing weightbearing with use of a wheelchair post-operatively.The recovery process was discussed including impact to work, walking, shoes and daily activities.  We discussed that the patient should anticipate up to 12 months for maximum recovery after surgery.  There is moderate risk involved.        After detailed discussed patient wishes to continue with the proposed surgical intervention.  The procedure will be performed under monitored anesthesia care with a local block for anesthesia.   Consents will be reviewed and signed tomorrow before surgery.      I appreciate the consult.      MARGO Thorne D.P.M., F.A.C.F.NICOS.     --------------------------------------------------------------------------------------------------------------------------------------------------------------------------------------------------------------------------------------  HPI:  Linda is a 78 year old female with history of hypertension chronic kidney disease stage III, hyperlipidemia, vitamin D deficiency, obesity, A-fib on Coumadin, type 2 diabetes mellitus with peripheral neuropathy, venous stasis who was admitted with cellulitis of the lower extremity.  The patient relates having fever and chills.   The patient relates redness and swelling around the ulcer.  Dr. Stewart Holliday requested an inpatient  consultation regarding the left foot condition.     REVIEW OF SYSTEMS:   Constitutional, HEENT, cardiovascular, pulmonary, GI, , musculoskeletal, neuro, skin, endocrine and psych systems are negative, except as otherwise noted.     PAST MEDICAL HISTORY:   Past Medical History:   Diagnosis Date    Depressive disorder, not elsewhere classified     Herpes zoster without mention of complication     Hypercalcemia 2/24/2007    Mild intermittent asthma     Other urinary incontinence     Type II or unspecified type diabetes mellitus with renal manifestations, uncontrolled(250.42) (H)     Type II or unspecified type diabetes mellitus without mention of complication, not stated as uncontrolled     Unspecified essential hypertension         PAST SURGICAL HISTORY:   Past Surgical History:   Procedure Laterality Date    CHOLECYSTECTOMY      ligation of fallopian tube      OPEN REDUCTION INTERNAL FIXATION ANKLE  10/13/11    left    pinning of left 2nd toe          MEDICATIONS:   Current Facility-Administered Medications:     acetaminophen (TYLENOL) tablet 650 mg, 650 mg, Oral, Q6H PRN, 650 mg at 09/25/23 1339 **OR** acetaminophen (TYLENOL) Suppository 650 mg, 650 mg, Rectal, Q6H PRN, Sherwin Sepulveda MD    bisacodyl (DULCOLAX) suppository 10 mg, 10 mg, Rectal, Daily PRN, Sherwin Sepulveda MD    glucose gel 15-30 g, 15-30 g, Oral, Q15 Min PRN **OR** dextrose 50 % injection 25-50 mL, 25-50 mL, Intravenous, Q15 Min PRN **OR** glucagon injection 1 mg, 1 mg, Subcutaneous, Q15 Min PRN, Sherwin Sepulveda MD    insulin aspart (NovoLOG) injection (RAPID ACTING), 1-3 Units, Subcutaneous, TID AC, Sherwin Sepulveda MD, 1 Units at 09/25/23 1153    insulin aspart (NovoLOG) injection (RAPID ACTING), 1-3 Units, Subcutaneous, At Bedtime, Sherwin Sepulveda MD, 1 Units at 09/24/23 2254    lisinopril (ZESTRIL) tablet 5 mg, 5 mg, Oral, Daily, Singh Hopper MD, 5 mg at 09/25/23 0804    melatonin tablet 1 mg, 1 mg, Oral, At Bedtime PRN, Sherwin Sepulveda MD    methadone (DOLOPHINE) tablet 10 mg, 10 mg, Oral, Daily, Singh Hopper  Wes Sparrow MD, 10 mg at 09/25/23 1608    methadone (DOLOPHINE) tablet 5 mg, 5 mg, Oral, TID, Singh Hopper MD, 5 mg at 09/25/23 1154    mineral oil-hydrophilic petrolatum (AQUAPHOR), , Topical, Q1H PRN, Sherwin Sepulveda MD, Given at 09/25/23 1419    naloxone (NARCAN) injection 0.2 mg, 0.2 mg, Intravenous, Q2 Min PRN **OR** naloxone (NARCAN) injection 0.4 mg, 0.4 mg, Intravenous, Q2 Min PRN **OR** naloxone (NARCAN) injection 0.2 mg, 0.2 mg, Intramuscular, Q2 Min PRN **OR** naloxone (NARCAN) injection 0.4 mg, 0.4 mg, Intramuscular, Q2 Min PRN, Singh Hopper MD    ondansetron (ZOFRAN ODT) ODT tab 4 mg, 4 mg, Oral, Q6H PRN **OR** ondansetron (ZOFRAN) injection 4 mg, 4 mg, Intravenous, Q6H PRN, Sherwin Sepulveda MD    oxyCODONE (ROXICODONE) tablet 5 mg, 5 mg, Oral, Q6H PRN, Zacarias Holliday MD    Patient is already receiving anticoagulation with heparin, enoxaparin (LOVENOX), warfarin (COUMADIN)  or other anticoagulant medication, , Does not apply, Continuous PRN, Sherwin Sepulveda MD    simvastatin (ZOCOR) tablet 20 mg, 20 mg, Oral, At Bedtime, Singh Hopper MD, 20 mg at 09/24/23 2128    torsemide (DEMADEX) tablet 15 mg, 15 mg, Oral, BID, Singh Hopper MD, 15 mg at 09/25/23 0804    vitamin C (ASCORBIC ACID) tablet 500 mg, 500 mg, Oral, Daily, Singh Hopper MD, 500 mg at 09/25/23 0804    warfarin ANTICOAGULANT (COUMADIN) tablet 2.5 mg, 2.5 mg, Oral, ONCE at 18:00, Zacarias Holliday MD    Warfarin Dose Required Daily - Pharmacist Managed, 1 each, Oral, See Admin Instructions, Singh Hopper MD     ALLERGIES:    Allergies   Allergen Reactions    Prednisone Nausea and Vomiting    Morphine Nausea and Vomiting    Morphine [Fumaric Acid] Nausea and Vomiting        SOCIAL HISTORY:   Social History     Socioeconomic History    Marital status:      Spouse name: Not on file    Number of children: Not on file    Years of  "education: Not on file    Highest education level: Not on file   Occupational History    Not on file   Tobacco Use    Smoking status: Never    Smokeless tobacco: Never   Substance and Sexual Activity    Alcohol use: No    Drug use: No    Sexual activity: Never   Other Topics Concern    Not on file   Social History Narrative    Not on file     Social Determinants of Health     Financial Resource Strain: Not on file   Food Insecurity: Not on file   Transportation Needs: Not on file   Physical Activity: Not on file   Stress: Not on file   Social Connections: Not on file   Interpersonal Safety: Not on file   Housing Stability: Not on file        FAMILY HISTORY:   Family History   Problem Relation Age of Onset    Hypertension Mother     Heart Disease Father         heart attack and cardiomegaly    Diabetes Sister         type 2    Hypertension Sister     Respiratory Sister     Psychotic Disorder Sister         bipolar    Cancer Maternal Grandmother         throat    Cancer Paternal Grandmother         gastric        EXAM:Vitals: /55 (BP Location: Left arm)   Pulse 78   Temp 97.6  F (36.4  C) (Oral)   Resp 17   Ht 1.676 m (5' 6\")   Wt 120.5 kg (265 lb 10.5 oz)   SpO2 98%   BMI 42.88 kg/m    BMI= Body mass index is 42.88 kg/m .    General appearance: Patient is alert and fully cooperative with history & exam.  No sign of distress is noted during the visit.     Psychiatric: Affect is pleasant & appropriate.  Patient appears motivated to improve health.     Respiratory: Breathing is regular & unlabored while sitting.     HEENT: Hearing is intact to spoken word.  Speech is clear.  No gross evidence of visual impairment that would impact ambulation.     Dermatologic: Noted necrotic tissue over the plantar lateral aspect of the left heel.  Surrounding erythema noted.    Vascular: DP & PT pulses are intact & regular bilaterally.  CFT and skin temperature is normal to both lower extremities.     Neurologic: Lower " extremity sensation is intact to light touch.  No evidence of weakness or contracture in the lower extremities.  Noted evidence of neuropathy.     Musculoskeletal: Patient is ambulatory without assistive device or brace.  No gross ankle deformity noted.  No foot or ankle joint effusion is noted.    LABS:    CBC   Component    Ref Range & Units     5:00 AM  (9/25/23)    2 d ago  (9/23/23)    3 d ago  (9/22/23)    3 mo ago  (5/31/23)    3 mo ago  (5/30/23)    3 mo ago  (5/29/23)    4 mo ago  (5/28/23)       WBC Count 4.0 - 11.0 10e3/uL 8.2 11.0 14.1 High  7.4 7.1 6.6 6.5    RBC Count 3.80 - 5.20 10e6/uL 3.14 Low  3.55 Low  3.72 Low  3.54 Low  3.48 Low  3.65 Low  3.40 Low     Hemoglobin 11.7 - 15.7 g/dL 9.8 Low  10.7 Low  11.4 Low  10.6 Low  10.5 Low  10.7 Low  10.1 Low     Hematocrit 35.0 - 47.0 % 29.3 Low  32.5 Low  33.4 Low  33.2 Low  32.9 Low  34.4 Low  31.5 Low     MCV 78 - 100 fL 93 92 90 94 95 94 93    MCH 26.5 - 33.0 pg 31.2 30.1 30.6 29.9 30.2 29.3 29.7    MCHC 31.5 - 36.5 g/dL 33.4 32.9 34.1 31.9 31.9 31.1 Low  32.1    RDW 10.0 - 15.0 % 12.5 12.2 12.4 13.4 13.5 13.6 13.6    Platelet Count 150 - 450 10e3/uL 178 196 230 133 Low  126 Low  128 Low  116 Low      Basic Metabolic Panel   Component    Ref Range & Units    2 d ago  (9/23/23)    3 d ago  (9/22/23)    3 mo ago  (6/5/23)    3 mo ago  (5/31/23)    3 mo ago  (5/30/23)    3 mo ago  (5/29/23)    4 mo ago  (5/28/23)       Sodium 136 - 145 mmol/L 140 138 138 138 140 139 141    Potassium 3.4 - 5.3 mmol/L 3.5 3.9 4.4 4.5 4.2 4.1 3.9    Chloride 98 - 107 mmol/L 101 99 99 102 104 103 105    Carbon Dioxide (CO2) 22 - 29 mmol/L 27 28 26 29 28 27 27    Anion Gap 7 - 15 mmol/L 12 11 13 7 8 9 9    Urea Nitrogen 8.0 - 23.0 mg/dL 51.0 High  55.9 High  22.9 21.1 22.1 25.7 High  31.0 High     Creatinine 0.51 - 0.95 mg/dL 1.14 High  1.29 High  1.40 High  1.16 High  1.07 High  1.10 High  1.15 High     Calcium 8.8 - 10.2 mg/dL 9.9 10.7 High  11.3 High  10.0 9.6 9.7 9.1     Glucose 70 - 99 mg/dL 113 High  162 High  153 High  167 High  192 High  187 High  177 High     GFR Estimate >60 mL/min/1.73m2 49 Low  42 Low  38 Low  CM 48 Low  CM 53 Low  CM 51 Low  CM 49 Low  CM     CRP   Component    Ref Range & Units    3 d ago  (9/22/23)    3 mo ago  (6/5/23)    3 mo ago  (5/31/23)    3 mo ago  (5/30/23)    3 mo ago  (5/29/23)    4 mo ago  (5/28/23)    4 mo ago  (5/27/23)       CRP Inflammation <5.00 mg/L 86.37 High  13.80 High  16.34 High  20.12 High  31.17 High  46.61 High  66.07 High      CULTURES:  Urine Culture 9/23/23 Culture in progress      <10,000 CFU/mL Enterococcus faecalis Abnormal       <10,000 CFU/mL Staphylococcus aureus Abnormal           DIAGNOSTICS:    Images were independently reviewed and I concur with findings by the radiologist below:    EXAM: US LOWER EXTREMITY VENOUS DUPLEX BILATERAL  LOCATION: [facility]  DATE: 9/23/2023     INDICATION: Bilateral calf/foot wounds; weeping  COMPARISON: None.  TECHNIQUE: Venous Duplex ultrasound of bilateral lower extremities with and without compression, augmentation and duplex. Color flow and spectral Doppler with waveform analysis performed.     FINDINGS: Exam includes the common femoral, femoral, popliteal veins as well as segmentally visualized deep calf veins and greater saphenous vein.      RIGHT: No deep vein thrombosis. No superficial thrombophlebitis. No popliteal cyst.     LEFT: No deep vein thrombosis. No superficial thrombophlebitis. No popliteal cyst.                                                                      IMPRESSION:     No deep venous thrombosis in the bilateral lower extremities.  Tomas Mata MD J. Benjamin Buren, D.P.ALBER., F.A.C.F.A.S.

## 2023-09-25 NOTE — PLAN OF CARE
"Goal Outcome Evaluation:       Reason for Admission:  Wound care, A. fib    Activity:  Patient sat at the side of the bed with PT/OT    Neuro: no feeling in the left heel, otherwise WNL    Cardiac: Apical pulse irregular        Resp: Diminished lung sounds    GI/: Using purwick when have to urinate, disconnected otherwise due to skin breakdown    Skin: See flowsheet    Diet: Carb counting consistent carb    IV Access/Drains: Saline locked    Vitals: /63 (BP Location: Left arm, Patient Position: Supine)   Pulse 68   Temp 98.1  F (36.7  C) (Oral)   Resp 16   Ht 1.676 m (5' 6\")   Wt 120.5 kg (265 lb 10.5 oz)   SpO2 96%   BMI 42.88 kg/m      Pain:  Scheduled methadone given, Tylenol and oxycodone 5 mg given 1339 for wound care.    Plan:  WOC was into see patient today, fallow wound care instructions.                    "

## 2023-09-25 NOTE — PROGRESS NOTES
"CLINICAL NUTRITION SERVICES - ASSESSMENT NOTE     Nutrition Prescription    RECOMMENDATIONS FOR MDs/PROVIDERS TO ORDER:  None at this time    Malnutrition Status:     Patient does not meet two of the established criteria necessary for diagnosing malnutrition but is at risk for malnutrition    Recommendations already ordered by Registered Dietitian (RD):  Please send strawberry Glucerna with patient lunch tray    Future/Additional Recommendations:  Monitor patient weight, intake, labs and GI/BM  Monitor patient tolerance to supplement     REASON FOR ASSESSMENT  Linda Loredo is a/an 78 year old female assessed by the dietitian for Admission Nutrition Risk Screen for positive    NUTRITION HISTORY  Patient is a 78 year old female who presents with likely chronic venous statis, multiple body ulcers, nasal MRSA + VE, DM2, CKD stage 3, hypercalcemia, pAfib, supra therapeutic INR, HTN.    Patient scored positive on nutrition risk screen for 24-33 lb weight loss and decreased appetite.     Per patient interview   Patient reports that her appetite is sporadic. She notes that she typically will eat at least two protein focused meals per day. Patient further notes that she has also started to implement protein shakes at home. Patient denied any chewing or swallowing difficulties. Linda noted no chronic GI concerns at this time. RD went over the importance of protein for wound healing and immunity. Patient was agreeable to a trial of glucerna with a preference for strawberry flavors.     CURRENT NUTRITION ORDERS  Diet: Orders Placed This Encounter      Moderate Consistent Carb (60 g CHO per Meal) Diet      Intake/Tolerance: Patient eating % of meals; patient appetite generally regarded as \"good\".    LABS  Labs reviewed  Creatinine elevated-1.11 mg/dL  GFR estimate decreased-51 mL/min/1.73m2  Glucose elevated-165 mg/dL    MEDICATIONS  Medications reviewed  Scheduled: rocephin, novolog, zestril, dolophine, " "vancocin, ascorbic acid, coumadin   Continuous: None  PRN: None pertinent    ANTHROPOMETRICS  Height: 167.6 cm (5' 6\")  Most Recent Weight: 120.5 kg (265 lb 10.5 oz)    IBW: 59 kg  BMI: Obesity Grade III BMI >40  Weight History:   Wt Readings from Last 15 Encounters:   09/23/23 120.5 kg (265 lb 10.5 oz)   06/22/23 111.4 kg (245 lb 8 oz)   06/19/23 111.4 kg (245 lb 8 oz)   06/16/23 111.4 kg (245 lb 8 oz)   06/13/23 111.5 kg (245 lb 12.8 oz)   06/12/23 121.6 kg (268 lb)   06/08/23 121.6 kg (268 lb)   06/05/23 121.6 kg (268 lb)   06/01/23 115.8 kg (255 lb 6.4 oz)   05/28/23 117.3 kg (258 lb 8 oz)   11/29/22 120.8 kg (266 lb 6.4 oz)   11/23/22 119.7 kg (264 lb)   11/23/22 119.7 kg (264 lb)   11/21/22 111.6 kg (246 lb)   11/18/22 119.7 kg (264 lb)   No significant weight loss in patient chart; difficult to assess for weight loss d/t fluid accumulation.    Dosing Weight: 74.4 kg-adjusted BW used.    ASSESSED NUTRITION NEEDS  Estimated Energy Needs: 1,860-2,232 kcals/day (25 - 30 kcals/kg)  Justification: Obese, adjusted BW used.  Estimated Protein Needs: 74-89 grams protein/day (1 - 1.2+ grams of pro/kg)  Justification: CKD and Increased needs  Estimated Fluid Needs: 1,860-2,232 mL/day (1 mL/kcal)   Justification: Per provider pending fluid status    PHYSICAL FINDINGS  See malnutrition section below.    MALNUTRITION  % Intake: Decreased intake does not meet criteria  % Weight Loss: None noted  Subcutaneous Fat Loss: None observed  Muscle Loss: None observed  Fluid Accumulation/Edema: Mild  Malnutrition Diagnosis: Patient does not meet two of the established criteria necessary for diagnosing malnutrition but is at risk for malnutrition    NUTRITION DIAGNOSIS  Inadequate protein intake related to increased needs as evidenced by wound healing.      INTERVENTIONS  Implementation  Nutrition education for nutrition relationship to health/disease   Collaboration with other providers-IDT rounds  Medical food supplement " therapy-please send strawberry glucerna with lunch meal.     Goals  Patient to consume % of nutritionally adequate meal trays TID, or the equivalent with supplements/snacks.     Monitoring/Evaluation  Progress toward goals will be monitored and evaluated per protocol.  Terrie Lopez RDN, LIV  Clinical Dietitian  Office: [phone]  Weekend pager: [phone]

## 2023-09-25 NOTE — PROGRESS NOTES
Care Management Follow Up    Length of Stay (days): 2    Expected Discharge Date: 09/25/2023     Concerns to be Addressed: adjustment to diagnosis/illness, basic needs, discharge planning     Patient plan of care discussed at interdisciplinary rounds: Yes    Anticipated Discharge Disposition: Transitional Care     Anticipated Discharge Services: Transportation Services  Anticipated Discharge DME: None    Patient/family educated on Medicare website which has current facility and service quality ratings: yes  Education Provided on the Discharge Plan: Yes  Patient/Family in Agreement with the Plan: other (see comments) ([place] apartment)    Referrals Placed by CM/SW: External Care Coordination, Post Acute Facilities, Transportation  Private pay costs discussed: Not applicable    Additional Information:    Per IDT rounds today, MD team states that patient is not medically ready for discharge today.     Spoke with Marcela ([email or web address] Phone: [phone] Fax: [phone]), SW with [facility]. Patient is currently open with [facility]. Marcela states that he went out to the patients home last Friday (9/22). Per marcela- patients home is cluttered with a very foul odor, and what appears to be blood and feces throughout the apartment.     Marcela reports that patient had an appointment for a M[place]hoice assessment earlier this year but she cancelled because she felt was she was doing well and didn't need additional services. Email sent to Arleen Stokes ([email or web address] Phone: [phone] Fax: [phone]), SW with [facility] requesting MNChoice assessment.    Per Arleen- Just talked with APS, sounds like we'll expedite this if we can.  We have an opening tomorrow between eleven and one.  Otherwise we are looking at next week.  If she is no longer admitted, the facility she admits to (if not [facility]) will need to  request an assessment there.  And as always, she needs to agree to the assessment, which historically she has declined.     Spoke with patient regarding MNChoice assessment. Patient states that she is in agreement with completing assessment. MNChoices assessment scheduled for tomorrow (9/26) at 1PM.    Last SLUMS was June 2023, patient scored 27/30. Patient continues to state that her goal is to return home to her independent living apartment.     Patient did not want CM to call her daughter today as she is on her way home from [place]. CM will follow up with daughter tomorrow (9/26).    PLAN: TCU- referrals pending    BEN STODDARD RN

## 2023-09-25 NOTE — INTERIM SUMMARY
Interval note    Noted to have loose wet heal necrosis by wound care nurse. Podiatry consulted with possible debridement in AM.     - NPO at midnight   - Warfarin held   - Left foot X-ray ordered     Zacarias Holliday MD    
WDL

## 2023-09-25 NOTE — PROGRESS NOTES
[facility]    Medicine Progress Note - Hospitalist Service    Date of Admission:  9/22/2023    Assessment & Plan      Linda Loredo is a 78 year old lady w/ hx of HTN, CKD III, HLD, Vit D def, obesity, pAfib on coumadin, DM2, venous stasis brought in by police after welfare check found to have leg swelling and redness raising concern for cellulitis vs worsening of her chronic venous stasis ulcers.     # Likely chronic venous statis more than Cellulitis.  # Multiple bodily ulcers  # Nasal MRSA +VE  # Urine positive for Enteric faecalis and Staphylococcus aureus   Patient has a long history of venous stasis ulcers and it is somewhat unclear if her current ulcers are acutely changed. She did have a leukocytosis to 14 and pro-calcitonin to 0.27 on presentation though leukocytosis rapidly resolved. Doppler without DVT. Appreciated review by antimicrobial stewardship team 9/25. With low suspicion for infection stopped antibiotics 9/25 and refrained from acquiring X-Ray foot. Given Staph in blood did order blood cultures 9/25 to look for possible blood stream infection.   - Discontinued Vanco and ceftriaxone 9/25   - Continued PTA Torsemide 15 mg every day   - WOC consult, Continued wound care   - Continued PTA methadone  - Ordered PTA Oxycodone 5 mg Q6H prn   - May require placement     # DM2  A1c 7.0 9/23/23. On deguldec 35 units HS PTA. Has had minimal sliding scale requirement.   - ISS for now / accuchecks / ADA diet  - Holding PTA Metformin   - Holding PTA Ozempic   - Holding PTA Degludec     # CKD III  # Hypercalcemia   - Daily bmp     # pAfib  # Supratherapeutic INR, resolved   Ventricular rate controlled  - Warfarin pharmacist to dose     # Hx HTN  - Continued PTA lisinopril 5 mg     # Dyslipidemia   - Continued PTA simvastatin 20 mg every day     PT/OT evaluation;  Patient apartment is condemned so she will need discharge plan    Medication reconciliation will need to be  "completed on this patient.        Diet: Moderate Consistent Carb (60 g CHO per Meal) Diet  Snacks/Supplements Adult: Glucerna; With Meals    DVT Prophylaxis: Warfarin  Braga Catheter: Not present  Lines: None     Cardiac Monitoring: None  Code Status: Full Code      Clinically Significant Risk Factors              # Hypoalbuminemia: Lowest albumin = 3.1 g/dL at 9/22/2023  7:41 PM, will monitor as appropriate     # Hypertension: Noted on problem list       # DMII: A1C = 7.0 % (Ref range: <5.7 %) within past 6 months, PRESENT ON ADMISSION  # Severe Obesity: Estimated body mass index is 42.88 kg/m  as calculated from the following:    Height as of this encounter: 1.676 m (5' 6\").    Weight as of this encounter: 120.5 kg (265 lb 10.5 oz)., PRESENT ON ADMISSION            Disposition Plan     Expected Discharge Date: 09/25/2023      Destination: inpatient rehabilitation facility            Zacarias Holliday MD  Hospitalist Service  [facility]  Securely message with [employer] (more info)  Text page via [employer] Paging/Directory   ______________________________________________________________________    Interval History     Reports feeling improved this morning but that she has continued to have her chronic leg pain associated with her venous stasis she states that this has gotten worse over the last few months. Discussed challenges with mobility and home and in using her wheelchair.    Physical Exam   Vital Signs: Temp: 98.1  F (36.7  C) Temp src: Oral BP: 115/63 Pulse: 68   Resp: 16 SpO2: 96 % O2 Device: None (Room air)    Weight: 265 lbs 10.47 oz    General Appearance: Awake and alert sitting up in bed in no acute distress   Respiratory: Breathing easily on room air   Cardiovascular: Irregular rhythm and regular rate extremities warm and well perfused.   GI: Abdomen soft non-tender and non-distended   Skin: LE wrapped with some erythema and edema on shins but excluding feet        Medical " Decision Making       45 MINUTES SPENT BY ME on the date of service doing chart review, history, exam, documentation & further activities per the note.      Data     I have personally reviewed the following data over the past 24 hrs:    8.2  \   9.8 (L)   / 178     N/A N/A N/A /  167 (H)   N/A N/A 1.11 (H) \     INR:  1.80 (H) PTT:  N/A   D-dimer:  N/A Fibrinogen:  N/A

## 2023-09-25 NOTE — CONSULTS
[facility] Nurse Inpatient Assessment     Consulted for: Legs, Coccyx    Summary: Pt was found down in her home after an unknown period of time so a full head to toe assessment was performed. In addition to wounds on bilateral buttocks, she has wounds on bilateral legs and an unstageable pressure injury on her left heel. This writer paged the provider to consult podiatry as area will need to be surgically debrided.     As patient doesn't tolerate the head of the bed flat for boosting, she is at higher risk for further friction/shearing to her buttocks/sacrum/coccyx. Continue to encourage the head of bed to be as low as she can tolerate and elevate legs on pillows while using knee gatch to decrease risk for shearing/friction as much as possible.    Pt will need to go to a transitional care unit with either lymphedema therapy services or nursing services for compression wraps or unna boots for bilateral lower legs.    Patient History (according to provider note(s):      Linda Loredo is a 78 year old lady w/ hx of HTN, CKD III, HLD, Vit D def, obesity, pAfib on coumadin, DM2, venous stasis brought in by police after welfare check. Pt found on the floor, covered in excrement. History was limited as pt was lethargic and falling asleep. Hx was primarily obtained by the ER notes. Pt reports profound weakness erich of the legs w/ b/l leg apin.     # Likely chronic venous statis more than Cellulitis.  # Multiple bodily ulcers  # Nasal MRSA +VE  # Urine positive for Enteric faecalis and Staphylococcus aureus   Patient has a long history of venous stasis ulcers and it is somewhat unclear if her current ulcers are acutely changed. She did have a leukocytosis to 14 and pro-calcitonin to 0.27 on presentation though leukocytosis rapidly resolved. Doppler without DVT. Appreciated review by antimicrobial stewardship team 9/25. With low suspicion for infection stopped antibiotics 9/25 and  refrained from acquiring X-Ray foot. Given Staph in blood did order blood cultures 9/25 to look for possible blood stream infection.   - Discontinued Vanco and ceftriaxone 9/25   - Continued PTA Torsemide 15 mg every day   - WOC consult, Continued wound care   - Continued PTA methadone  - Ordered PTA Oxycodone 5 mg Q6H prn   - May require placement      Assessment:      Areas visualized during today's visit: Complete head to toe     Pressure Injury Location: Left Buttocks      Last photo: 9/25/23  Wound type: Pressure Injury, Friction, Shear, and Moisture Associated Skin Damage (MASD)     Pressure Injury Stage: 2 AND Suspected Deep Tissue Injury, present on admission  Wound history/plan of care:  Pt found down in home for unknown period of time covered in stool  See darker area below tape measure; full area measured 7cm x 14cm with erythema. Within this were 3 measureable areas which all appear to be suspected deep tissue injuries as they were dark and non-blanching. From proximal to distal they measured 1cm x 1.5cm (also with open wound), 1.5cm x 4.5cm, and 1.5cm x 3cm. Since these are suspected deep tissue injuries, these areas are likely to open and reveal deeper tissue damage    Wound base: see photo - mix of clean red and flaky skin     Palpation of the wound bed: normal      Drainage: small     Description of drainage: serosanguinous     Measurements (length x width x depth, in cm) 3cm x 1.5cm x flush     Tunneling N/A     Undermining N/A  Periwound skin: Dry/scaly and Erythema- blanchable      Color: pink and red      Temperature: normal   Odor: none  Pain: during dressing change  Pain intervention prior to dressing change: oral Tylenol and Oxycodone  Treatment goal: Heal   STATUS: initial assessment  Supplies ordered: gathered, discussed with RN, and discussed with patient     Wound location: Right buttocks/posterior thigh    Last photo: 9/25/23  Wound due to: Friction, Shear, and Moisture Associated Skin  Damage (MASD)  Wound history/plan of care:  Pt found down in home for unknown period of time covered in stool  Wound base: pink/purple coloring is all blanchable (measures 30cm in length) with crusty/scaly area on left posterior thigh       Palpation of the wound bed: normal      Drainage: scant     Description of drainage: bloody     Measurements (length x width x depth, in cm): 1cm x 3.5cm x flush     Tunneling: N/A     Undermining: N/A  Periwound skin: Dry/scaly      Color: pink and purple, blanchable      Temperature: normal   Odor: none  Pain: wounds not painful but positioning for wounds to be assessed was painful  Pain intervention prior to dressing change: oral Tylenol and Oxycodone  Treatment goal: Heal   STATUS: initial assessment  Supplies ordered: at bedside, discussed with RN, and discussed with patient      Wound location: Right Lower Leg    Last photo: 9/25/23  Wound due to: Venous Ulcer  Wound history/plan of care: Pt has had venous wounds for quite a while and has followed with wound care providers through [facility]. Pt has been following with [facility] and states unna boots were in use but she wasn't tolerating them r/t pain. She has had treatment with [facility] in the past also.  Wound base: see photos - wounds with mix of non-viable yellow and clean pink/red tissue; wounds on medial leg are dry     Palpation of the wound bed: normal      Drainage: moderate     Description of drainage: serosanguinous     Measurements (length x width x depth, in cm):    Lateral - proximal wound measures 2.5cm x 3cm x flush and distal wound measured 5.3cm x 2cm x flush   Anterior - area on anterior leg with mix of scaly dry areas, dry red, and one open wound measures 17cm x 15cm; most proximal wound measures 2.5cm x 1.5cm and has a mix of clean red and yellow necrotic tissue  Medial - 8.5cm x 5cm with 3 wounds within; all are dry/with dry drainage     Tunneling: N/A     Undermining:  N/A  Periwound skin: Dry/scaly      Color: pink      Temperature: normal   Odor: none  Pain: Legs are very painful for patient with left leg more painful than right  Pain intervention prior to dressing change: oral Tylenol and Oxycodone  Treatment goal: Heal , Drainage control, Infection control/prevention, Increase granulation, and Remove necrotic tissue  STATUS: initial assessment  Supplies ordered: gathered, supplies stored on unit, discussed with RN, and discussed with patient      Wound location: Left Lower Leg    Last photo: 9/25/23  Wound due to: Venous Ulcer  Wound history/plan of care: Pt has had venous wounds for quite a while and has followed with wound care providers through [facility]. Pt has been following with [facility] and states unna boots were in use but she wasn't tolerating them r/t pain. She has had treatment with [facility] in the past also.  Wound base: see photo - wounds are a mix of clean pink/red and yellow nonviable     Palpation of the wound bed: normal      Drainage:  heavy     Description of drainage: serosanguinous     Measurements (length x width x depth, in cm): Pt with small wound immediately distal to knee measuring 0.4cm x 0.4cm x flush (dry yellow tissue); large area of wounds/scaly moist area (anterior leg)  measures 18.5cm x 14cm x flush - 0.2cm; area of open wounds on lateral leg measures 15cm x 7.5cm x flush - 0.2cm     Tunneling: N/A     Undermining: N/A  Periwound skin: Dry/scaly      Color: pink      Temperature: normal   Odor: none  Pain: Legs are very painful for patient with left leg more painful than right  Pain intervention prior to dressing change: oral Tylenol and Oxycodone  Treatment goal: Heal , Drainage control, Infection control/prevention, Increase granulation, and Remove necrotic tissue  STATUS: initial assessment  Supplies ordered: gathered, supplies stored on unit, discussed with RN, and discussed with patient      Pressure Injury  Location: Left Heel    Last photo: 9/25/23  Wound type: Pressure Injury     Pressure Injury Stage: Unstageable, present on admission   Wound history/plan of care:   Pt states she had a prior wound in this location and underwent surgery 10/31/22 followed by a wound VAC. She states the wound subsequently healed. She wasn't aware that the wound had reopened.    Wound base: see photo - mix of minimal clean pink - 5%; with 50% loose dark eschar and 45% yellow non-viable      Palpation of the wound bed: boggy      Drainage: small     Description of drainage: serosanguinous, yellow, and tan     Measurements (length x width x depth, in cm) 6cm x 4.5cm x estimated 0.2-0.3cm     Tunneling N/A     Undermining N/A  Periwound skin: Intact      Color: normal and consistent with surrounding tissue      Temperature: normal   Odor: offensive  Pain: Pt voiced pain with positioning of her leg to be able to see the heel wound; difficult to determine if the heel wound was also painful as pt reported numbness  Pain intervention prior to dressing change: oral Tylenol and Oxycodone  Treatment goal: Infection control/prevention; pt will need surgical debridement  STATUS: initial assessment  Supplies ordered: gathered, at bedside, discussed with RN, and discussed with patient     My PI Risk Assessment     Sensory Perception: 3 - Slightly Limited     Moisture: 3 - Occasionally moist      Activity: 1 - Bedfast      Mobility: 2 - Very limited     Nutrition: 2 - Probably inadequate      Friction/Shear: 1 - Problem     TOTAL: 12     Other areas:  No wound cares needed for the wounds documented below at this time    Left Hand - Suspected Deep Tissue Injury present on admission (blood-filled bullae)    2.5cm x 1.7cm    Left Elbow - Stage 2 pressure injury (based on description, area was a suspected deep tissue injury on admission but has evolved to a Stage 2)    Measures 1cm x 0.9cm x flush with scant bleeding    Right Heel - Suspected Deep Tissue  Injury present on admission    Measures 2cm x 3.3cm - dry/stable    Left foot - Left 5th toe is Suspected Deep Tissue Pressure Injury present on admission; bunion has dried drainage    0.9cm x 0.5cm (lateral 5th toe); 0.4cm x 0.8cm (left bunion) - both are dry/stable    Pedal and PT pulses palpable and  biphasic by doppler bilaterally.  Cap refill <3 sec  Pt with neuropathy BLE  Pitting edema 2-3+ bilateral feet; legs edematous but non-pitting (very painful when this writer attempted to assess for pitting on legs); with wrinkling on legs/feet can tell that edema is reduced from baseline    Treatment Plan:     Buttock wound(s): BID and PRN perineal cares  1.) Cleanse area with Marisol Cleanse and Protect cleansing lotion.  2.) Apply Marisol or Critic-Aid Barrier Paste to any open areas. Apply Aquaphor to any flaky patches.    Bilateral leg wound(s): Daily  1.) Clean intact skin on legs and feet with Wander wipes.   2.) Clean wounds with Microklenz wound cleanser OR can do Vashe soak (Vashe on gauze) to wounds for 5-10 minutes. Then gently wipe wounds with gauze and pat dry. Can use Marisol Cleanse and Protect to remove [facility] but no need to scrub. Remove soiled product and reapply.  3.) Apply Aquaphor to any dry, flaky areas. To any open wounds with yellow tissue, apply [facility]ilic Wound Dressing.  4.) Cover wounds with heavy drainage with Mepilex Transfer (this can be cut to fit). For any flaky areas, apply Vaseline gauze or Curity/Adaptic over the Aquaphor.  5.) Cover area of heavy drainage with ABD pads and secure with rolled gauze.    Repeat Step 5 as needed throughout the day for strikethrough drainage.    Left heel wound: Daily  1.) Clean wound with Microklenz wound cleanser and gauze. Pat dry.  2.) Apply povidone-iodine to woven gauze, squeeze out excess (so that it is moist but not dripping) and apply to wound.   3.) Cover with dry gauze or ABD pad and secured with rolled gauze.    Elevate bilateral legs on pillows  "so that heels are floating.    Pressure Injury Prevention (PIP) Plan:  If patient is declining pressure injury prevention interventions: Explore reason why and address patient's concerns, Educate on pressure injury risk and prevention intervention(s), If patient is still declining, document \"informed refusal\" , and Ensure Care team is aware ( provider, charge nurse, etc)  Mattress: Follow bed algorithm, reassess daily and order specialty mattress, if indicated.  HOB:  Ideally less than 30 degrees but pt isn't tolerating so have HOB as low as pt can tolerated  Repositioning in bed: Every 1-2 hours , Left/right positioning; avoid supine, and Raise foot of bed prior to raising head of bed, to reduce patient sliding down (shear)  Heels: Keep elevated off mattress and Pillows under calves  Protective Dressing: None  Positioning Equipment: Seated Positioning System (SPS)  (#560682) Sling must have contact with chair, no pillow or chair cushion beneath  Chair positioning: Do NOT use a donut for sitting (this increases pressure to smaller area and creates a higher potential for injury)  and Seated Positioning System    If patient has a buttock pressure injury, or high risk for PI use chair cushion or SPS.  Moisture Management: Avoid brief in bed, Consider InterDry (#308830) between folds, and Moisturize dry skin  Under Devices: Inspect skin under all medical devices during skin inspection , Ensure tubes are stabilized without tension, and Ensure patient is not lying on medical devices or equipment when repositioned  Ask provider to discontinue device when no longer needed.     Orders: Written    RECOMMEND PRIMARY TEAM ORDER: Podiatry consult  Education provided: importance of repositioning, plan of care, wound progress, and Off-loading pressure  Discussed plan of care with: Patient and Nurse  WOC nurse follow-up plan: weekly  Notify [facility] if wound(s) deteriorate.  Nursing to notify the Provider(s) and re-consult the WO Nurse " if new skin concern.    DATA:     Current support surface: Standard  Low air loss (BRISA pump, Isolibrium, Pulsate, skin guard, etc)  Containment of urine/stool: Continent of bladder, Continent of bowel, Incontinent pad in bed, and Purewick external catheter   BMI: Body mass index is 42.88 kg/m .   Active diet order: Orders Placed This Encounter      Moderate Consistent Carb (60 g CHO per Meal) Diet      NPO per Anesthesia Guidelines for Procedure/Surgery Except for: Meds     Output: I/O last 3 completed shifts:  In: 472 [P.O.:472]  Out: 2150 [Urine:2150]     Labs:   Recent Labs   Lab 09/25/23  0500 09/24/23  1216 09/23/23  0533 09/22/23  1941   ALBUMIN  --   --   --  3.1*   HGB 9.8*  --  10.7* 11.4*   INR 1.80*   < > 4.97* 4.08*   WBC 8.2  --  11.0 14.1*   A1C  --   --  7.0*  --     < > = values in this interval not displayed.     Pressure injury risk assessment:   Sensory Perception: 3-->slightly limited  Moisture: 3-->occasionally moist  Activity: 1-->bedfast  Mobility: 2-->very limited  Nutrition: 2-->probably inadequate  Friction and Shear: 1-->problem  Suresh Score: 12    Anastasia Gray RN, CWOCN  Vocera group: [employer] Nurse  Dept. Office Number: [phone]

## 2023-09-25 NOTE — PROGRESS NOTES
"Pt alert, oriented, lift. Pt is bedfast. LS diminished, on RA. Pt has multiple wounds on BLLE. Dressings are CDI. Blood glucose monitoring.   Scheduled methadone for pain, effective. Denies nausea.     /63   Pulse 68   Temp 97.8  F (36.6  C) (Oral)   Resp 16   Ht 1.676 m (5' 6\")   Wt 120.5 kg (265 lb 10.5 oz)   SpO2 98%   BMI 42.88 kg/m      "

## 2023-09-25 NOTE — PROGRESS NOTES
Antimicrobial Stewardship Team Note    Antimicrobial Stewardship Program - A joint venture between [facility] and  Physicians to optimize antibiotic management.  NOT a formal consult - Restricted Antimicrobial Review     Patient: Linda Loredo  MRN: [id number]  Allergies: Prednisone, Morphine, and Morphine [fumaric acid]    Brief Summary: Linda Loredo is a 78 year old female with PMHx significant for Type 2 DM c/b neuropathy/diabetic ulcers, obesity, COPD, paroxysmal A. Fib (on warfarin), who was admitted on 9/22/2023 with concerns of wound right and left lower extremity infection.     History of Present Illness: She was brought by EMS from her home after a welfare check performed by police. Police stated the home smelled of fecal matter and that the patient was unable to get up off the floor or put on pants.    On presentation to the ED, she was afebrile (Tmax 98.7), hemodynamically stable, and on room air with brief requirement of 2L supplemental O2 via nasal cannula. Initial labs were notable for elevated WBC (14.1), creatinine 1.29, and CK (498). CXR showed clear lungs and normal heart size. Bilateral LE US was negative for DVT. SARS CoV2 PCR was negative. MRSA nasal swab was positive. 9/23 UC with <10,000 CFU/mL Enterococcus faecalis and Staphylococcus aureus (collected after abx initiated and catheter inserted). While in the ED, she was given vancomycin and ceftriaxone, both of which she remains on today.         Active Anti-infective Medications   (From admission, onward)                 Start     Stop    09/24/23 0200  vancomycin (VANCOCIN) injection  1,250 mg,   Intravenous,   EVERY 24 HOURS        Skin and Soft Tissue Infection       --    09/23/23 2100  cefTRIAXone  1 g,   Intravenous,   EVERY 24 HOURS        Skin and Soft Tissue Infection       --                  Assessment: Venous Stasis vs bilateral, non-purulent SSTI   Patient did not present with symptoms  consistent with SSTI as cellulitis typically is unilateral in presentation. Venous stasis is usually bilateral and can cause skin irritation and redness. Patient s presentation seems to align with venous stasis rather than an infection as the swelling and redness is throughout her legs and her WBC improved quickly in 24 hours. Additionally, the wounds are open, but do not appear infected. Lower suspicion of patient's leukocytosis on admission being attributable to a UTI in the absence of symptoms. Patient does not appear to have a chronic catheter in place nor has previously isolated Staph aureus in the urine. Given isolation of Staph aureus in the urine, recommend obtaining blood cultures to better help rule out hematological seeding. If concerned for ongoing lower extremity infection, recommend a left foot xray for further diagnostic workup. Given quick resolution of leukocytosis (may have been reactive to being found down) and lack of systemic symptoms, consider stopping ceftriaxone and vancomycin with close monitoring. If concerned for ongoing infection, increase ceftriaxone to 2g.    Recommendations:  Consider stopping ceftriaxone and vancomycin with close monitoring  Please obtain 2 sets of blood cultures given isolation of Staph aureus in the urine   Consider xray of the left foot to assess for deeper infection of ulcer  If concerned for infection and favor continuing antibiotic therapy, increase ceftriaxone to 2g    Pharmacy took the following actions: Called/paged provider, Electronic note created.    Discussed with ID Staff: Elvi Elliott MD; Stephanie Ulloa, PharmD, BCIDP  Nyasia Diamond, 2024 PharmD Candidate  [phone]    Vital Signs/Clinical Features:  Vitals         09/23 0700  09/24 0659 09/24 0700  09/25 0659 09/25 0700  09/25 1332   Most Recent      Temp ( F) 98 -  98.5    97.8 -  98.3      98.1     98.1 (36.7) 09/25 0720    Pulse 76 -  89    68 -  80      68     68 09/25 0720    Resp 16 -  18     16 -  18      16     16 09/25 0720    BP 99/44 -  135/57    103/63 -  114/57      115/63     115/63 09/25 0720    SpO2 (%) 96 -  100    95 -  100      96     96 09/25 0720            Labs  Estimated Creatinine Clearance: 55.3 mL/min (A) (based on SCr of 1.11 mg/dL (H)).  Recent Labs   Lab Test 05/31/23  0530 06/05/23  0715 09/22/23 1941 09/23/23  0533 09/24/23  0456 09/25/23  0500   CR 1.16* 1.40* 1.29* 1.14* 1.13* 1.11*       Recent Labs   Lab Test 05/29/23  0518 05/30/23  0519 05/31/23  0530 09/22/23 1941 09/23/23  0533 09/25/23  0500   WBC 6.6 7.1 7.4 14.1* 11.0 8.2   HGB 10.7* 10.5* 10.6* 11.4* 10.7* 9.8*   HCT 34.4* 32.9* 33.2* 33.4* 32.5* 29.3*   MCV 94 95 94 90 92 93   * 126* 133* 230 196 178       Recent Labs   Lab Test 05/26/23  1508 05/27/23 0558 05/28/23 0605 05/29/23 0518 09/22/23 1941   BILITOTAL 0.6 0.4 0.3 0.2 0.6   ALKPHOS 90 61 61 75 82   ALBUMIN 3.5 2.3* 2.4* 2.8* 3.1*   AST 43* 33 28 22 90*   ALT 23 15 10 6* 43       Recent Labs   Lab Test 05/26/23  1508 05/26/23 1943 05/26/23 2234 05/27/23  0558 05/28/23  0605 05/29/23 0518 05/30/23  0519 05/31/23  0530 06/05/23  0715 09/22/23 1941 09/23/23  0533   PCAL 0.15*  --   --  0.16* 0.15*  --   --   --   --   --  0.27*   LACT 4.4* 2.4* 0.9  --   --   --   --   --   --   --   --    CRPI 80.57*  --   --  66.07* 46.61* 31.17* 20.12* 16.34* 13.80* 86.37*  --        Recent Labs   Lab Test 05/29/23  0549   VANCOMYCIN 15.1       Culture Results:  7-Day Micro Results       Procedure Component Value Units Date/Time    Urine Culture [[id number]]  (Abnormal) Collected: 09/23/23 1302    Order Status: Completed Lab Status: Preliminary result Updated: 09/25/23 1001    Specimen: Urine, Catheter      Culture Culture in progress      <10,000 CFU/mL Enterococcus faecalis      <10,000 CFU/mL Staphylococcus aureus    MRSA MSSA PCR, Nasal Swab [[id number]]  (Abnormal) Collected: 09/23/23 0420    Order Status: Completed Lab Status: Final result  Updated: 09/23/23 1230    Specimen: Swab from Nose      MRSA Target DNA Positive     SA Target DNA Positive    Narrative:      The [facility]  Xpert SA Nasal Complete assay performed in the [employer]  Dx System is a qualitative in vitro diagnostic test designed for rapid detection of Staphylococcus aureus (SA) and methicillin-resistant Staphylococcus aureus (MRSA) from nasal swabs in patients at risk for nasal colonization. The test utilizes automated real-time polymerase chain reaction (PCR) to detect MRSA/SA DNA. The Xpert SA Nasal Complete assay is intended to aid in the prevention and control of MRSA/SA infections in healthcare settings. The assay is not intended to diagnose, guide or monitor treatment for MRSA/SA infections, or provide results of susceptibility to methicillin. A negative result does not preclude MRSA/SA nasal colonization.             Recent Labs   Lab Test 05/26/23  1658 09/23/23  1302   URINEPH 5.0 5.0   NITRITE Negative Negative   LEUKEST Moderate* Large*   WBCU 88* >182*                         Imaging: US Lower Extremity Venous Duplex Bilateral    Result Date: 9/23/2023  EXAM: US LOWER EXTREMITY VENOUS DUPLEX BILATERAL LOCATION: [facility] DATE: 9/23/2023 INDICATION: Bilateral calf/foot wounds; weeping COMPARISON: None. TECHNIQUE: Venous Duplex ultrasound of bilateral lower extremities with and without compression, augmentation and duplex. Color flow and spectral Doppler with waveform analysis performed. FINDINGS: Exam includes the common femoral, femoral, popliteal veins as well as segmentally visualized deep calf veins and greater saphenous vein. RIGHT: No deep vein thrombosis. No superficial thrombophlebitis. No popliteal cyst. LEFT: No deep vein thrombosis. No superficial thrombophlebitis. No popliteal cyst.     IMPRESSION: No deep venous thrombosis in the bilateral lower extremities.    XR Chest Port 1 View    Result Date: 9/22/2023  EXAM: XR CHEST PORT 1 VIEW  LOCATION: [facility] DATE: 9/22/2023 INDICATION: weakness, elevated WBC, unclear source   rule out pneumonia COMPARISON: 05/26/2023     IMPRESSION: Heart size is normal. Lungs are clear bilaterally. Mediastinum and visualized bony structures are stable in appearance. Interval removal of right-sided PICC line.

## 2023-09-25 NOTE — CONSULTS
[facility] Podiatric Surgical Inpatient Consult    ASSESSMENT:    1.  Decubitus heel ulcer left  2.  Cellulitis left lower extremity  3.  Type II diabetes mellitus with peripheral neuropathy  4.  Venous stasis lower extremity.    RECOMMENDATIONS:    I have explained to Linda about the current condition involving the left foot.  We discussed the severity of the infection including the real possibility of loss of the lower leg if the infection spreads.  We also discussed the possible life threatening condition if the infection get into the blood stream.  At this point, I am recommending surgical debridement of the decubitus heel ulcer on the left foot.  The nature of the patient s condition was discussed at length.  I discussed with patient details of procedure(s), possible risks and complications, alternative treatment options and post-operative course detailed below.   The patient was educated today about risks associated with surgery.  Risks of surgery include, but are not limited to: infection, painful scar, nerve injury, numbness, stiffness, need for repeat surgery, recurrence of condition, ongoing pain, delayed wound healing, blood clot in the legs or lungs, amputation or other unforeseen side effects from undergoing surgery.       The patient was instructed to not smoke or use nicotine patches before and after the surgery as this could result in poor outcomes due to slower healing potentially.  Risks of DVT/PE were discussed in relation to anticipated level of immobilization, inactivity, injury, surgery, medications and personal risk factors.  Perioperative education was provided regarding signs and symptoms of a blood clot.  The patient was encouraged to be vigilant regarding symptoms and pursue risk reduction measures.  Based on patient history, procedure and post-operative plan, risk outweighs benefit of chemical prophylaxis therefore mechanical prophylaxis was encouraged.  Lower extremity range of  motion exercises are encouraged.   Postoperative pain regimens were discussed in great detail the patient.  The risks and benefits of non-narcotic and narcotic pain medications, as well as synergistic agents was also discussed.  The patient will be prescribed Oxycodone for more severe breakthrough pain not relieved by scheduled Tylenol.  The patient was also encouraged to rest, elevate and ice postoperatively to help with swelling and pain control.  The patient was in agreement with this plan.  The patient understands that they would need to remain nonweightbearing weightbearing with use of a wheelchair post-operatively.The recovery process was discussed including impact to work, walking, shoes and daily activities.  We discussed that the patient should anticipate up to 12 months for maximum recovery after surgery.  There is moderate risk involved.        After detailed discussed patient wishes to continue with the proposed surgical intervention.  The procedure will be performed under monitored anesthesia care with a local block for anesthesia.   Consents will be reviewed and signed tomorrow before surgery.      I appreciate the consult.      MARGO Thorne D.P.M., F.A.C.F.NICOS.     --------------------------------------------------------------------------------------------------------------------------------------------------------------------------------------------------------------------------------------  HPI:  Linda is a 78 year old female with history of hypertension chronic kidney disease stage III, hyperlipidemia, vitamin D deficiency, obesity, A-fib on Coumadin, type 2 diabetes mellitus with peripheral neuropathy, venous stasis who was admitted with cellulitis of the lower extremity.  The patient relates having fever and chills.   The patient relates redness and swelling around the ulcer.  Dr. Stewart Holliday requested an inpatient  consultation regarding the left foot condition.     REVIEW OF SYSTEMS:   Constitutional, HEENT, cardiovascular, pulmonary, GI, , musculoskeletal, neuro, skin, endocrine and psych systems are negative, except as otherwise noted.     PAST MEDICAL HISTORY:   Past Medical History:   Diagnosis Date    Depressive disorder, not elsewhere classified     Herpes zoster without mention of complication     Hypercalcemia 2/24/2007    Mild intermittent asthma     Other urinary incontinence     Type II or unspecified type diabetes mellitus with renal manifestations, uncontrolled(250.42) (H)     Type II or unspecified type diabetes mellitus without mention of complication, not stated as uncontrolled     Unspecified essential hypertension         PAST SURGICAL HISTORY:   Past Surgical History:   Procedure Laterality Date    CHOLECYSTECTOMY      ligation of fallopian tube      OPEN REDUCTION INTERNAL FIXATION ANKLE  10/13/11    left    pinning of left 2nd toe          MEDICATIONS:   Current Facility-Administered Medications:     acetaminophen (TYLENOL) tablet 650 mg, 650 mg, Oral, Q6H PRN, 650 mg at 09/25/23 1339 **OR** acetaminophen (TYLENOL) Suppository 650 mg, 650 mg, Rectal, Q6H PRN, Sherwin Sepulveda MD    bisacodyl (DULCOLAX) suppository 10 mg, 10 mg, Rectal, Daily PRN, Sherwin Sepulveda MD    glucose gel 15-30 g, 15-30 g, Oral, Q15 Min PRN **OR** dextrose 50 % injection 25-50 mL, 25-50 mL, Intravenous, Q15 Min PRN **OR** glucagon injection 1 mg, 1 mg, Subcutaneous, Q15 Min PRN, Sherwin Sepulveda MD    insulin aspart (NovoLOG) injection (RAPID ACTING), 1-3 Units, Subcutaneous, TID AC, Sherwin Sepulveda MD, 1 Units at 09/25/23 1153    insulin aspart (NovoLOG) injection (RAPID ACTING), 1-3 Units, Subcutaneous, At Bedtime, Sherwin Sepulveda MD, 1 Units at 09/24/23 2254    lisinopril (ZESTRIL) tablet 5 mg, 5 mg, Oral, Daily, Singh Hopper MD, 5 mg at 09/25/23 0804    melatonin tablet 1 mg, 1 mg, Oral, At Bedtime PRN, Sherwin Sepulveda MD    methadone (DOLOPHINE) tablet 10 mg, 10 mg, Oral, Daily, Singh Hopper  Wes Sparrow MD, 10 mg at 09/25/23 1608    methadone (DOLOPHINE) tablet 5 mg, 5 mg, Oral, TID, Singh Hopper MD, 5 mg at 09/25/23 1154    mineral oil-hydrophilic petrolatum (AQUAPHOR), , Topical, Q1H PRN, Sherwin Sepulveda MD, Given at 09/25/23 1419    naloxone (NARCAN) injection 0.2 mg, 0.2 mg, Intravenous, Q2 Min PRN **OR** naloxone (NARCAN) injection 0.4 mg, 0.4 mg, Intravenous, Q2 Min PRN **OR** naloxone (NARCAN) injection 0.2 mg, 0.2 mg, Intramuscular, Q2 Min PRN **OR** naloxone (NARCAN) injection 0.4 mg, 0.4 mg, Intramuscular, Q2 Min PRN, Singh Hopper MD    ondansetron (ZOFRAN ODT) ODT tab 4 mg, 4 mg, Oral, Q6H PRN **OR** ondansetron (ZOFRAN) injection 4 mg, 4 mg, Intravenous, Q6H PRN, Sherwin Sepulveda MD    oxyCODONE (ROXICODONE) tablet 5 mg, 5 mg, Oral, Q6H PRN, Zacarias Holliday MD    Patient is already receiving anticoagulation with heparin, enoxaparin (LOVENOX), warfarin (COUMADIN)  or other anticoagulant medication, , Does not apply, Continuous PRN, Sherwin Sepulveda MD    simvastatin (ZOCOR) tablet 20 mg, 20 mg, Oral, At Bedtime, Singh Hopper MD, 20 mg at 09/24/23 2128    torsemide (DEMADEX) tablet 15 mg, 15 mg, Oral, BID, Singh Hopper MD, 15 mg at 09/25/23 0804    vitamin C (ASCORBIC ACID) tablet 500 mg, 500 mg, Oral, Daily, Singh Hopper MD, 500 mg at 09/25/23 0804    warfarin ANTICOAGULANT (COUMADIN) tablet 2.5 mg, 2.5 mg, Oral, ONCE at 18:00, Zacarias Holliday MD    Warfarin Dose Required Daily - Pharmacist Managed, 1 each, Oral, See Admin Instructions, Singh Hopper MD     ALLERGIES:    Allergies   Allergen Reactions    Prednisone Nausea and Vomiting    Morphine Nausea and Vomiting    Morphine [Fumaric Acid] Nausea and Vomiting        SOCIAL HISTORY:   Social History     Socioeconomic History    Marital status:      Spouse name: Not on file    Number of children: Not on file    Years of  "education: Not on file    Highest education level: Not on file   Occupational History    Not on file   Tobacco Use    Smoking status: Never    Smokeless tobacco: Never   Substance and Sexual Activity    Alcohol use: No    Drug use: No    Sexual activity: Never   Other Topics Concern    Not on file   Social History Narrative    Not on file     Social Determinants of Health     Financial Resource Strain: Not on file   Food Insecurity: Not on file   Transportation Needs: Not on file   Physical Activity: Not on file   Stress: Not on file   Social Connections: Not on file   Interpersonal Safety: Not on file   Housing Stability: Not on file        FAMILY HISTORY:   Family History   Problem Relation Age of Onset    Hypertension Mother     Heart Disease Father         heart attack and cardiomegaly    Diabetes Sister         type 2    Hypertension Sister     Respiratory Sister     Psychotic Disorder Sister         bipolar    Cancer Maternal Grandmother         throat    Cancer Paternal Grandmother         gastric        EXAM:Vitals: /55 (BP Location: Left arm)   Pulse 78   Temp 97.6  F (36.4  C) (Oral)   Resp 17   Ht 1.676 m (5' 6\")   Wt 120.5 kg (265 lb 10.5 oz)   SpO2 98%   BMI 42.88 kg/m    BMI= Body mass index is 42.88 kg/m .    General appearance: Patient is alert and fully cooperative with history & exam.  No sign of distress is noted during the visit.     Psychiatric: Affect is pleasant & appropriate.  Patient appears motivated to improve health.     Respiratory: Breathing is regular & unlabored while sitting.     HEENT: Hearing is intact to spoken word.  Speech is clear.  No gross evidence of visual impairment that would impact ambulation.     Dermatologic: Noted necrotic tissue over the plantar lateral aspect of the left heel.  Surrounding erythema noted.    Vascular: DP & PT pulses are intact & regular bilaterally.  CFT and skin temperature is normal to both lower extremities.     Neurologic: Lower " extremity sensation is intact to light touch.  No evidence of weakness or contracture in the lower extremities.  Noted evidence of neuropathy.     Musculoskeletal: Patient is ambulatory without assistive device or brace.  No gross ankle deformity noted.  No foot or ankle joint effusion is noted.    LABS:    CBC   Component    Ref Range & Units     5:00 AM  (9/25/23)    2 d ago  (9/23/23)    3 d ago  (9/22/23)    3 mo ago  (5/31/23)    3 mo ago  (5/30/23)    3 mo ago  (5/29/23)    4 mo ago  (5/28/23)       WBC Count 4.0 - 11.0 10e3/uL 8.2 11.0 14.1 High  7.4 7.1 6.6 6.5    RBC Count 3.80 - 5.20 10e6/uL 3.14 Low  3.55 Low  3.72 Low  3.54 Low  3.48 Low  3.65 Low  3.40 Low     Hemoglobin 11.7 - 15.7 g/dL 9.8 Low  10.7 Low  11.4 Low  10.6 Low  10.5 Low  10.7 Low  10.1 Low     Hematocrit 35.0 - 47.0 % 29.3 Low  32.5 Low  33.4 Low  33.2 Low  32.9 Low  34.4 Low  31.5 Low     MCV 78 - 100 fL 93 92 90 94 95 94 93    MCH 26.5 - 33.0 pg 31.2 30.1 30.6 29.9 30.2 29.3 29.7    MCHC 31.5 - 36.5 g/dL 33.4 32.9 34.1 31.9 31.9 31.1 Low  32.1    RDW 10.0 - 15.0 % 12.5 12.2 12.4 13.4 13.5 13.6 13.6    Platelet Count 150 - 450 10e3/uL 178 196 230 133 Low  126 Low  128 Low  116 Low      Basic Metabolic Panel   Component    Ref Range & Units    2 d ago  (9/23/23)    3 d ago  (9/22/23)    3 mo ago  (6/5/23)    3 mo ago  (5/31/23)    3 mo ago  (5/30/23)    3 mo ago  (5/29/23)    4 mo ago  (5/28/23)       Sodium 136 - 145 mmol/L 140 138 138 138 140 139 141    Potassium 3.4 - 5.3 mmol/L 3.5 3.9 4.4 4.5 4.2 4.1 3.9    Chloride 98 - 107 mmol/L 101 99 99 102 104 103 105    Carbon Dioxide (CO2) 22 - 29 mmol/L 27 28 26 29 28 27 27    Anion Gap 7 - 15 mmol/L 12 11 13 7 8 9 9    Urea Nitrogen 8.0 - 23.0 mg/dL 51.0 High  55.9 High  22.9 21.1 22.1 25.7 High  31.0 High     Creatinine 0.51 - 0.95 mg/dL 1.14 High  1.29 High  1.40 High  1.16 High  1.07 High  1.10 High  1.15 High     Calcium 8.8 - 10.2 mg/dL 9.9 10.7 High  11.3 High  10.0 9.6 9.7 9.1     Glucose 70 - 99 mg/dL 113 High  162 High  153 High  167 High  192 High  187 High  177 High     GFR Estimate >60 mL/min/1.73m2 49 Low  42 Low  38 Low  CM 48 Low  CM 53 Low  CM 51 Low  CM 49 Low  CM     CRP   Component    Ref Range & Units    3 d ago  (9/22/23)    3 mo ago  (6/5/23)    3 mo ago  (5/31/23)    3 mo ago  (5/30/23)    3 mo ago  (5/29/23)    4 mo ago  (5/28/23)    4 mo ago  (5/27/23)       CRP Inflammation <5.00 mg/L 86.37 High  13.80 High  16.34 High  20.12 High  31.17 High  46.61 High  66.07 High      CULTURES:  Urine Culture 9/23/23 Culture in progress      <10,000 CFU/mL Enterococcus faecalis Abnormal       <10,000 CFU/mL Staphylococcus aureus Abnormal           DIAGNOSTICS:    Images were independently reviewed and I concur with findings by the radiologist below:    EXAM: US LOWER EXTREMITY VENOUS DUPLEX BILATERAL  LOCATION: [facility]  DATE: 9/23/2023     INDICATION: Bilateral calf/foot wounds; weeping  COMPARISON: None.  TECHNIQUE: Venous Duplex ultrasound of bilateral lower extremities with and without compression, augmentation and duplex. Color flow and spectral Doppler with waveform analysis performed.     FINDINGS: Exam includes the common femoral, femoral, popliteal veins as well as segmentally visualized deep calf veins and greater saphenous vein.      RIGHT: No deep vein thrombosis. No superficial thrombophlebitis. No popliteal cyst.     LEFT: No deep vein thrombosis. No superficial thrombophlebitis. No popliteal cyst.                                                                      IMPRESSION:     No deep venous thrombosis in the bilateral lower extremities.  Tomas Mata MD J. Benjamin Buren, D.P.ALBER., F.A.C.F.A.S.

## 2023-09-26 ENCOUNTER — ANESTHESIA EVENT (OUTPATIENT)
Dept: SURGERY | Facility: CLINIC | Age: 78
DRG: 629 | End: 2023-09-26
Payer: COMMERCIAL

## 2023-09-26 ENCOUNTER — ANESTHESIA (OUTPATIENT)
Dept: SURGERY | Facility: CLINIC | Age: 78
DRG: 629 | End: 2023-09-26
Payer: COMMERCIAL

## 2023-09-26 ENCOUNTER — APPOINTMENT (OUTPATIENT)
Dept: OCCUPATIONAL THERAPY | Facility: CLINIC | Age: 78
DRG: 629 | End: 2023-09-26
Payer: COMMERCIAL

## 2023-09-26 LAB
ANION GAP SERPL CALCULATED.3IONS-SCNC: 8 MMOL/L (ref 7–15)
BACTERIA UR CULT: ABNORMAL
BUN SERPL-MCNC: 32.4 MG/DL (ref 8–23)
CALCIUM SERPL-MCNC: 9.7 MG/DL (ref 8.8–10.2)
CHLORIDE SERPL-SCNC: 101 MMOL/L (ref 98–107)
CREAT SERPL-MCNC: 1.15 MG/DL (ref 0.51–0.95)
DEPRECATED HCO3 PLAS-SCNC: 33 MMOL/L (ref 22–29)
EGFRCR SERPLBLD CKD-EPI 2021: 49 ML/MIN/1.73M2
ERYTHROCYTE [DISTWIDTH] IN BLOOD BY AUTOMATED COUNT: 12.4 % (ref 10–15)
GLUCOSE BLDC GLUCOMTR-MCNC: 131 MG/DL (ref 70–99)
GLUCOSE BLDC GLUCOMTR-MCNC: 135 MG/DL (ref 70–99)
GLUCOSE BLDC GLUCOMTR-MCNC: 156 MG/DL (ref 70–99)
GLUCOSE BLDC GLUCOMTR-MCNC: 167 MG/DL (ref 70–99)
GLUCOSE BLDC GLUCOMTR-MCNC: 202 MG/DL (ref 70–99)
GLUCOSE BLDC GLUCOMTR-MCNC: 205 MG/DL (ref 70–99)
GLUCOSE SERPL-MCNC: 176 MG/DL (ref 70–99)
GRAM STAIN RESULT: ABNORMAL
HCT VFR BLD AUTO: 33.4 % (ref 35–47)
HGB BLD-MCNC: 10.7 G/DL (ref 11.7–15.7)
INR PPP: 1.61 (ref 0.85–1.15)
MCH RBC QN AUTO: 30.1 PG (ref 26.5–33)
MCHC RBC AUTO-ENTMCNC: 32 G/DL (ref 31.5–36.5)
MCV RBC AUTO: 94 FL (ref 78–100)
PLATELET # BLD AUTO: 209 10E3/UL (ref 150–450)
POTASSIUM SERPL-SCNC: 4.3 MMOL/L (ref 3.4–5.3)
RBC # BLD AUTO: 3.56 10E6/UL (ref 3.8–5.2)
SODIUM SERPL-SCNC: 142 MMOL/L (ref 136–145)
WBC # BLD AUTO: 7.6 10E3/UL (ref 4–11)

## 2023-09-26 PROCEDURE — 999N000141 HC STATISTIC PRE-PROCEDURE NURSING ASSESSMENT: Performed by: PODIATRIST

## 2023-09-26 PROCEDURE — 250N000011 HC RX IP 250 OP 636: Performed by: PODIATRIST

## 2023-09-26 PROCEDURE — 250N000011 HC RX IP 250 OP 636: Mod: JZ | Performed by: STUDENT IN AN ORGANIZED HEALTH CARE EDUCATION/TRAINING PROGRAM

## 2023-09-26 PROCEDURE — 2894A PR VOIDCORRECT: CPT | Performed by: PODIATRIST

## 2023-09-26 PROCEDURE — 87075 CULTR BACTERIA EXCEPT BLOOD: CPT | Performed by: PODIATRIST

## 2023-09-26 PROCEDURE — 11047 DBRDMT BONE EACH ADDL: CPT | Performed by: PODIATRIST

## 2023-09-26 PROCEDURE — 258N000003 HC RX IP 258 OP 636: Performed by: STUDENT IN AN ORGANIZED HEALTH CARE EDUCATION/TRAINING PROGRAM

## 2023-09-26 PROCEDURE — 250N000011 HC RX IP 250 OP 636: Performed by: NURSE ANESTHETIST, CERTIFIED REGISTERED

## 2023-09-26 PROCEDURE — 120N000001 HC R&B MED SURG/OB

## 2023-09-26 PROCEDURE — 85610 PROTHROMBIN TIME: CPT | Performed by: STUDENT IN AN ORGANIZED HEALTH CARE EDUCATION/TRAINING PROGRAM

## 2023-09-26 PROCEDURE — 250N000013 HC RX MED GY IP 250 OP 250 PS 637: Performed by: PODIATRIST

## 2023-09-26 PROCEDURE — 250N000009 HC RX 250: Performed by: NURSE ANESTHETIST, CERTIFIED REGISTERED

## 2023-09-26 PROCEDURE — 87205 SMEAR GRAM STAIN: CPT | Performed by: PODIATRIST

## 2023-09-26 PROCEDURE — 99232 SBSQ HOSP IP/OBS MODERATE 35: CPT | Performed by: STUDENT IN AN ORGANIZED HEALTH CARE EDUCATION/TRAINING PROGRAM

## 2023-09-26 PROCEDURE — 0QBM0ZZ EXCISION OF LEFT TARSAL, OPEN APPROACH: ICD-10-PCS | Performed by: PODIATRIST

## 2023-09-26 PROCEDURE — 258N000003 HC RX IP 258 OP 636: Performed by: PODIATRIST

## 2023-09-26 PROCEDURE — 360N000075 HC SURGERY LEVEL 2, PER MIN: Performed by: PODIATRIST

## 2023-09-26 PROCEDURE — 250N000013 HC RX MED GY IP 250 OP 250 PS 637: Performed by: STUDENT IN AN ORGANIZED HEALTH CARE EDUCATION/TRAINING PROGRAM

## 2023-09-26 PROCEDURE — 250N000013 HC RX MED GY IP 250 OP 250 PS 637: Performed by: NURSE ANESTHETIST, CERTIFIED REGISTERED

## 2023-09-26 PROCEDURE — 97530 THERAPEUTIC ACTIVITIES: CPT | Mod: GO | Performed by: OCCUPATIONAL THERAPIST

## 2023-09-26 PROCEDURE — 88305 TISSUE EXAM BY PATHOLOGIST: CPT | Mod: TC | Performed by: PODIATRIST

## 2023-09-26 PROCEDURE — 370N000017 HC ANESTHESIA TECHNICAL FEE, PER MIN: Performed by: PODIATRIST

## 2023-09-26 PROCEDURE — 87077 CULTURE AEROBIC IDENTIFY: CPT | Performed by: PODIATRIST

## 2023-09-26 PROCEDURE — 710N000009 HC RECOVERY PHASE 1, LEVEL 1, PER MIN: Performed by: PODIATRIST

## 2023-09-26 PROCEDURE — 250N000009 HC RX 250: Performed by: PODIATRIST

## 2023-09-26 PROCEDURE — 11044 DBRDMT BONE 1ST 20 SQ CM/<: CPT | Performed by: PODIATRIST

## 2023-09-26 PROCEDURE — 80048 BASIC METABOLIC PNL TOTAL CA: CPT | Performed by: STUDENT IN AN ORGANIZED HEALTH CARE EDUCATION/TRAINING PROGRAM

## 2023-09-26 PROCEDURE — 36415 COLL VENOUS BLD VENIPUNCTURE: CPT | Performed by: STUDENT IN AN ORGANIZED HEALTH CARE EDUCATION/TRAINING PROGRAM

## 2023-09-26 PROCEDURE — 272N000001 HC OR GENERAL SUPPLY STERILE: Performed by: PODIATRIST

## 2023-09-26 PROCEDURE — 85027 COMPLETE CBC AUTOMATED: CPT | Performed by: STUDENT IN AN ORGANIZED HEALTH CARE EDUCATION/TRAINING PROGRAM

## 2023-09-26 RX ORDER — SODIUM CHLORIDE, SODIUM LACTATE, POTASSIUM CHLORIDE, CALCIUM CHLORIDE 600; 310; 30; 20 MG/100ML; MG/100ML; MG/100ML; MG/100ML
INJECTION, SOLUTION INTRAVENOUS CONTINUOUS
Status: DISCONTINUED | OUTPATIENT
Start: 2023-09-26 | End: 2023-09-26 | Stop reason: HOSPADM

## 2023-09-26 RX ORDER — ONDANSETRON 2 MG/ML
4 INJECTION INTRAMUSCULAR; INTRAVENOUS EVERY 30 MIN PRN
Status: DISCONTINUED | OUTPATIENT
Start: 2023-09-26 | End: 2023-09-26 | Stop reason: HOSPADM

## 2023-09-26 RX ORDER — SILVER SULFADIAZINE 10 MG/G
CREAM TOPICAL PRN
Status: DISCONTINUED | OUTPATIENT
Start: 2023-09-26 | End: 2023-09-26 | Stop reason: HOSPADM

## 2023-09-26 RX ORDER — ONDANSETRON 2 MG/ML
INJECTION INTRAMUSCULAR; INTRAVENOUS PRN
Status: DISCONTINUED | OUTPATIENT
Start: 2023-09-26 | End: 2023-09-26

## 2023-09-26 RX ORDER — ONDANSETRON 4 MG/1
4 TABLET, ORALLY DISINTEGRATING ORAL EVERY 30 MIN PRN
Status: DISCONTINUED | OUTPATIENT
Start: 2023-09-26 | End: 2023-09-26 | Stop reason: HOSPADM

## 2023-09-26 RX ORDER — CEFAZOLIN SODIUM/WATER 3 G/30 ML
3 SYRINGE (ML) INTRAVENOUS
Status: DISCONTINUED | OUTPATIENT
Start: 2023-09-26 | End: 2023-09-29

## 2023-09-26 RX ORDER — SODIUM CHLORIDE, SODIUM LACTATE, POTASSIUM CHLORIDE, CALCIUM CHLORIDE 600; 310; 30; 20 MG/100ML; MG/100ML; MG/100ML; MG/100ML
INJECTION, SOLUTION INTRAVENOUS CONTINUOUS
Status: DISCONTINUED | OUTPATIENT
Start: 2023-09-26 | End: 2023-09-27

## 2023-09-26 RX ORDER — CEFAZOLIN SODIUM/WATER 3 G/30 ML
3 SYRINGE (ML) INTRAVENOUS SEE ADMIN INSTRUCTIONS
Status: DISCONTINUED | OUTPATIENT
Start: 2023-09-26 | End: 2023-09-29

## 2023-09-26 RX ORDER — LIDOCAINE HYDROCHLORIDE 10 MG/ML
INJECTION, SOLUTION INFILTRATION; PERINEURAL PRN
Status: DISCONTINUED | OUTPATIENT
Start: 2023-09-26 | End: 2023-09-26 | Stop reason: HOSPADM

## 2023-09-26 RX ORDER — LIDOCAINE 40 MG/G
CREAM TOPICAL
Status: DISCONTINUED | OUTPATIENT
Start: 2023-09-26 | End: 2023-09-27

## 2023-09-26 RX ORDER — BUPIVACAINE HYDROCHLORIDE 5 MG/ML
INJECTION, SOLUTION PERINEURAL PRN
Status: DISCONTINUED | OUTPATIENT
Start: 2023-09-26 | End: 2023-09-26 | Stop reason: HOSPADM

## 2023-09-26 RX ORDER — ACETAMINOPHEN 325 MG/1
975 TABLET ORAL ONCE
Status: DISCONTINUED | OUTPATIENT
Start: 2023-09-26 | End: 2023-09-26

## 2023-09-26 RX ORDER — PROPOFOL 10 MG/ML
INJECTION, EMULSION INTRAVENOUS CONTINUOUS PRN
Status: DISCONTINUED | OUTPATIENT
Start: 2023-09-26 | End: 2023-09-26

## 2023-09-26 RX ORDER — KETAMINE HYDROCHLORIDE 10 MG/ML
INJECTION INTRAMUSCULAR; INTRAVENOUS PRN
Status: DISCONTINUED | OUTPATIENT
Start: 2023-09-26 | End: 2023-09-26

## 2023-09-26 RX ORDER — FENTANYL CITRATE 50 UG/ML
25 INJECTION, SOLUTION INTRAMUSCULAR; INTRAVENOUS EVERY 5 MIN PRN
Status: DISCONTINUED | OUTPATIENT
Start: 2023-09-26 | End: 2023-09-26 | Stop reason: HOSPADM

## 2023-09-26 RX ORDER — CEFTRIAXONE 1 G/1
1 INJECTION, POWDER, FOR SOLUTION INTRAMUSCULAR; INTRAVENOUS EVERY 24 HOURS
Status: DISCONTINUED | OUTPATIENT
Start: 2023-09-26 | End: 2023-09-28

## 2023-09-26 RX ORDER — GABAPENTIN 100 MG/1
100 CAPSULE ORAL
Status: COMPLETED | OUTPATIENT
Start: 2023-09-26 | End: 2023-09-26

## 2023-09-26 RX ORDER — HYDROMORPHONE HCL IN WATER/PF 6 MG/30 ML
0.2 PATIENT CONTROLLED ANALGESIA SYRINGE INTRAVENOUS EVERY 5 MIN PRN
Status: DISCONTINUED | OUTPATIENT
Start: 2023-09-26 | End: 2023-09-26 | Stop reason: HOSPADM

## 2023-09-26 RX ORDER — CEFAZOLIN SODIUM 1 G/50ML
1250 SOLUTION INTRAVENOUS EVERY 24 HOURS
Status: DISCONTINUED | OUTPATIENT
Start: 2023-09-26 | End: 2023-09-27

## 2023-09-26 RX ADMIN — KETAMINE HYDROCHLORIDE 20 MG: 10 INJECTION INTRAMUSCULAR; INTRAVENOUS at 16:28

## 2023-09-26 RX ADMIN — METHADONE HYDROCHLORIDE 5 MG: 5 TABLET ORAL at 22:49

## 2023-09-26 RX ADMIN — LISINOPRIL 5 MG: 5 TABLET ORAL at 09:06

## 2023-09-26 RX ADMIN — PROPOFOL 25 MCG/KG/MIN: 10 INJECTION, EMULSION INTRAVENOUS at 16:28

## 2023-09-26 RX ADMIN — VANCOMYCIN HYDROCHLORIDE 1250 MG: 5 INJECTION, POWDER, LYOPHILIZED, FOR SOLUTION INTRAVENOUS at 15:14

## 2023-09-26 RX ADMIN — MIDAZOLAM 2 MG: 1 INJECTION INTRAMUSCULAR; INTRAVENOUS at 16:25

## 2023-09-26 RX ADMIN — SODIUM CHLORIDE, POTASSIUM CHLORIDE, SODIUM LACTATE AND CALCIUM CHLORIDE: 600; 310; 30; 20 INJECTION, SOLUTION INTRAVENOUS at 17:29

## 2023-09-26 RX ADMIN — TORSEMIDE 15 MG: 5 TABLET ORAL at 22:50

## 2023-09-26 RX ADMIN — METHADONE HYDROCHLORIDE 5 MG: 5 TABLET ORAL at 05:51

## 2023-09-26 RX ADMIN — OXYCODONE HYDROCHLORIDE AND ACETAMINOPHEN 500 MG: 500 TABLET ORAL at 09:06

## 2023-09-26 RX ADMIN — INSULIN ASPART 1 UNITS: 100 INJECTION, SOLUTION INTRAVENOUS; SUBCUTANEOUS at 14:15

## 2023-09-26 RX ADMIN — METHADONE HYDROCHLORIDE 5 MG: 5 TABLET ORAL at 13:04

## 2023-09-26 RX ADMIN — TORSEMIDE 15 MG: 5 TABLET ORAL at 09:12

## 2023-09-26 RX ADMIN — SIMVASTATIN 20 MG: 20 TABLET, FILM COATED ORAL at 22:49

## 2023-09-26 RX ADMIN — INSULIN ASPART 1 UNITS: 100 INJECTION, SOLUTION INTRAVENOUS; SUBCUTANEOUS at 09:11

## 2023-09-26 RX ADMIN — GABAPENTIN 100 MG: 100 CAPSULE ORAL at 15:44

## 2023-09-26 RX ADMIN — ONDANSETRON 4 MG: 2 INJECTION INTRAMUSCULAR; INTRAVENOUS at 16:28

## 2023-09-26 RX ADMIN — CEFTRIAXONE SODIUM 1 G: 1 INJECTION, POWDER, FOR SOLUTION INTRAMUSCULAR; INTRAVENOUS at 14:14

## 2023-09-26 RX ADMIN — ACETAMINOPHEN 975 MG: 325 TABLET, FILM COATED ORAL at 15:44

## 2023-09-26 ASSESSMENT — ACTIVITIES OF DAILY LIVING (ADL)
ADLS_ACUITY_SCORE: 38
ADLS_ACUITY_SCORE: 32
ADLS_ACUITY_SCORE: 38

## 2023-09-26 ASSESSMENT — ENCOUNTER SYMPTOMS: DYSRHYTHMIAS: 1

## 2023-09-26 ASSESSMENT — COPD QUESTIONNAIRES: COPD: 1

## 2023-09-26 NOTE — PROGRESS NOTES
[facility]    Medicine Progress Note - Hospitalist Service    Date of Admission:  9/22/2023    Assessment & Plan      Lindapancho Loredo is a 78 year old lady w/ hx of HTN, CKD III, HLD, Vit D def, obesity, pAfib on coumadin, DM2, venous stasis brought in by police after welfare check found to have leg swelling and redness raising concern for cellulitis vs worsening of her chronic venous stasis ulcers. Found to have left ankle necrosis with plan to go to OR 9/26.     # Likely chronic venous statis more than Cellulitis.  # Multiple bodily ulcers  # Nasal MRSA +VE  # Urine positive for Enteric faecalis, Staphylococcus aureus, and E. coli   # Decubitus ulcer of left heal   Patient has a long history of venous stasis ulcers and it is somewhat unclear if her current ulcers are acutely changed. She did have a leukocytosis to 14 and pro-calcitonin to 0.27 on presentation though leukocytosis rapidly resolved. Doppler without DVT. Appreciated review by antimicrobial stewardship team 9/25. With low suspicion for infection stopped antibiotics 9/25. however review of wound of left heal by podiatry concern raised for infection and OR scheduled. Given concern for infection antibiotics restarted pending operative findings.    - Foot X-ray pending   - Restarted Vanco and ceftriaxone  - Left foor X-ray formal read pending   - Podiatry consulted and following   - Planning surgical debridement 9/26   - NPO for procedure   - Holding warfarin   - Blood cultures from 9/25 without growth to date, final results pending   - Continued PTA Torsemide 15 mg every day   - Continued wound care   - Continued PTA methadone  - Continued PTA Oxycodone 5 mg Q6H prn   - May require placement however patient resistant to this idea     # DM2  A1c 7.0 9/23/23. On deguldec 35 units HS PTA. Has had minimal sliding scale requirement.   - ISS for now / accuchecks / ADA diet  - Holding PTA Metformin   - Holding PTA Ozempic  "  - Holding PTA Degludec     # CKD III  # Hypercalcemia   - Daily bmp     # pAfib  # Supratherapeutic INR, resolved   Ventricular rate controlled  Holding warfarin with surgical procedure 9/26     # Hx HTN  - Continued PTA lisinopril 5 mg     # Dyslipidemia   - Continued PTA simvastatin 20 mg every day     PT/OT evaluation;  Patient apartment is condemned so she will need discharge plan    Medication reconciliation will need to be completed on this patient.        Diet: Snacks/Supplements Adult: Glucerna; With Meals  NPO per Anesthesia Guidelines for Procedure/Surgery Except for: Meds    DVT Prophylaxis: Warfarin  Braga Catheter: Not present  Lines: None     Cardiac Monitoring: None  Code Status: Full Code      Clinically Significant Risk Factors              # Hypoalbuminemia: Lowest albumin = 3.1 g/dL at 9/22/2023  7:41 PM, will monitor as appropriate       # Hypertension: Noted on problem list       # DMII: A1C = 7.0 % (Ref range: <5.7 %) within past 6 months  , PRESENT ON ADMISSION    # Severe Obesity: Estimated body mass index is 42.88 kg/m  as calculated from the following:    Height as of this encounter: 1.676 m (5' 6\").    Weight as of this encounter: 120.5 kg (265 lb 10.5 oz)., PRESENT ON ADMISSION            Disposition Plan      Expected Discharge Date: 09/27/2023      Destination: inpatient rehabilitation facility            Zacarias Holliday MD  Hospitalist Service  [facility]  Securely message with [employer] (more info)  Text page via [facility] Paging/Directory   ______________________________________________________________________    Interval History   Worried this morning about surgery but denies having any fevers, chills, or sweats. Voiced frustration and worry about possible not being able to return to her appartment.     Physical Exam   Vital Signs: Temp: 97.3  F (36.3  C) Temp src: Oral BP: (!) 143/69 Pulse: 78   Resp: 18 SpO2: 93 % O2 Device: None (Room air)    Weight: " 265 lbs 10.47 oz    General Appearance: Awake and alert sitting up in bed in no acute distress   Respiratory: Breathing easily on room air   Cardiovascular: Irregular rhythm and regular rate extremities warm and well perfused.   GI: Abdomen soft non-tender and non-distended   Skin: LE wrapped with some erythema and edema on shins, left foot wrapped        Medical Decision Making       45 MINUTES SPENT BY ME on the date of service doing chart review, history, exam, documentation & further activities per the note.      Data     I have personally reviewed the following data over the past 24 hrs:    7.6  \   10.7 (L)   / 209     142 101 32.4 (H) /  156 (H)   4.3 33 (H) 1.15 (H) \     INR:  1.61 (H) PTT:  N/A   D-dimer:  N/A Fibrinogen:  N/A

## 2023-09-26 NOTE — ANESTHESIA CARE TRANSFER NOTE
Patient: Linda Loredo    Procedure: Procedure(s):  Surgical debridement of all nonviable skin and soft tissue and bone left foot       Diagnosis: Diabetic ulcer of left heel associated with type 2 diabetes mellitus, with muscle involvement without evidence of necrosis (H) [E11.621, L97.425]  Diagnosis Additional Information: No value filed.    Anesthesia Type:   General     Note:    Oropharynx: oropharynx clear of all foreign objects  Level of Consciousness: awake  Oxygen Supplementation: face mask        Vital Signs Stable: post-procedure vital signs reviewed and stable  Report to RN Given: handoff report given  Patient transferred to: Phase II    Handoff Report: Identifed the Patient, Identified the Reponsible Provider, Reviewed the pertinent medical history, Discussed the surgical course, Reviewed Intra-OP anesthesia mangement and issues during anesthesia, Set expectations for post-procedure period and Allowed opportunity for questions and acknowledgement of understanding  Vitals:  Vitals Value Taken Time   BP     Temp     Pulse     Resp     SpO2         Electronically Signed By: MOE Navarro CRNA  September 26, 2023  4:53 PM

## 2023-09-26 NOTE — BRIEF OP NOTE
[facility]    Brief Operative Note    Pre-operative diagnosis: Diabetic ulcer of left heel associated with type 2 diabetes mellitus, with muscle involvement without evidence of necrosis (H) [E11.621, L97.425]  Post-operative diagnosis Same as pre-operative diagnosis    Procedure: Surgical debridement of all nonviable skin and soft tissue and bone left foot, Left - Leg    Surgeon: Surgeon(s) and Role:     * Nolberto Thorne DPM - Primary  Anesthesia: MAC with Local   Estimated Blood Loss: Less than 50 ml    Drains: None  Specimens:   ID Type Source Tests Collected by Time Destination   1 : left heel tissue Tissue Heel, Left SURGICAL PATHOLOGY EXAM Nolberto Thorne DPM 9/26/2023  4:59 PM    2 : left heel bone Biopsy Heel, Left SURGICAL PATHOLOGY EXAM Nolberto Thorne DPM 9/26/2023  4:59 PM    A : left heel bone for culture and gram stains Bone Biopsy Foot, Left ANAEROBIC BACTERIAL CULTURE ROUTINE, GRAM STAIN, AEROBIC BACTERIAL CULTURE ROUTINE Nolberto Thorne DPM 9/26/2023  4:57 PM    B : left heel tissue for cultures and gram stain Tissue Heel, Left ANAEROBIC BACTERIAL CULTURE ROUTINE, GRAM STAIN, AEROBIC BACTERIAL CULTURE ROUTINE Nolberto Thorne DPM 9/26/2023  4:58 PM      Findings:   None.  Complications: None.  Implants: * No implants in log *

## 2023-09-26 NOTE — PROGRESS NOTES
"Care Management Follow Up    Length of Stay (days): 3    Expected Discharge Date: 09/27/2023     Concerns to be Addressed: adjustment to diagnosis/illness, basic needs     Patient plan of care discussed at interdisciplinary rounds: Yes    Anticipated Discharge Disposition: Transitional Care     Anticipated Discharge Services: Transportation Services  Anticipated Discharge DME: None    Patient/family educated on Medicare website which has current facility and service quality ratings: yes  Education Provided on the Discharge Plan: Yes  Patient/Family in Agreement with the Plan: other (see comments) ([place] apartment)    Referrals Placed by CM/SW: External Care Coordination, Post Acute Facilities, Transportation  Private pay costs discussed: Not applicable- not today     Additional Information:    [facility] assessment in progress at this time. Patient is scheduled for  the OR at 4pm for debridement of left foot. [facility]  will try to accomplish as much as possible but may not be able to complete the entire assessment in one visit.     Writer spoke w/ [facility] [place] [facility] ([phone]) to provide an update. Joshua would \"guess\" that patient has been struggling living independently for a \"couple of weeks\" and unable to care for self. Patient had been followed by [facility] for wound care. Joshua was not aware of any [facility] report filed by [facility].     TCU referrals pending. CM unable to meet w/ patient today d/t [facility]hoices assessment and OR @ 4pm.     CM to follow.     DOYLE Che  Care Management, [facility] [place]  [phone]         "

## 2023-09-26 NOTE — PROGRESS NOTES
"Pt. A & O x 4, pain 6/10, able to make needs known, uses call light, on room air, NPO due to I/D procedure, PIV L upper arm currently running antibiotic, swallows pills whole, offered ceiling lift to chair and pt declined, /62 (BP Location: Right arm)   Pulse 87   Temp 98.9  F (37.2  C) (Oral)   Resp 18   Ht 1.753 m (5' 9\")   Wt 120.2 kg (265 lb)   SpO2 95%   BMI 39.13 kg/m       Cares provided per wound care plan, using pillows to shift weight, pure wick when needing to void, no brief in bed,     Pt very concerned about having to go  into a LTC facility, [facility] here just before pt went for I/D, Writer encouraged pt to be open an honest about the cares she needs, what she can do and what she struggles with. Want her to succeed at home.This was very comforting for her.     "

## 2023-09-26 NOTE — OP NOTE
PREOPERATIVE DIAGNOSIS:   1.  Infected decubitus ulcer, left foot.     POSTOPERATIVE DIAGNOSIS:   1.  Infected decubitus ulcer, left foot.     PROCEDURE:   1.  Surgical excision of all nonviable skin, soft tissue and bone, left foot.     PATHOLOGY:   1.  Bone, left foot.     TISSUE CULTURES: Aerobic/Anaerobic/Gram stain  1.  Soft tissue, heel left   2.  Bone, calcaneus left     ANESTHESIA: Local MAC     ESTIMATED BLOOD LOSS: Less than 50 mL     INDICATIONS FOR PROCEDURE: Linda Loredo is a 78 year old-year-old female with an infected decubitus ulcer of the left foot. The patient was consented for surgical excisional debridement of all nonviable infected skin, soft tissue and bone, left foot.     PROCEDURE IN DETAIL: Under mild sedation, the patient in the operating room and placed on the operating table in the supine position.  After adequate sedation, approximately 20 cc of a 50/50 mixture of 1%  lidocaine plain and 0.5% marcaine plain was injected around the rearfoot of the left foot. The foot was then scrubbed, prepped and draped in the usual aseptic manner.      The procedure began with sharp excisional debridement of all nonviable ligaments, muscles, tendons, skin, subcutaneous tissue and fascia on the left foot with a #10 blade and rongeur.  The lateral aspect of the calcaneus was sharply debrided with a large bone rongeur.  Samples of bone and soft tissue was sent to microbiology and pathology.  The remaining tissue appears healthy with good perfusion.  The resulting wound measured approximately 10 x 8 x 5 cm with a total area of 80 cm   via sharp, excisional debridement with a #10 blade.     The wound was irrigated with copious amounts of normal sterile saline.  All active bleeding was cauterized and liggated as necessary.   The wound was dressed with sterile Silvadene impregnated gauze, ABD pad, 4 x 4s, cast padding and an Ace wrap. The patient tolerated the anesthesia and procedure well, and  was transferred to the PACU with vital signs stable and vascular status intact to the left lower extremity. The patient will be transferred back to the floor for continued antibiotic therapy and wound care.         RAISSA DOTY DPM, FACFAS

## 2023-09-26 NOTE — ANESTHESIA POSTPROCEDURE EVALUATION
Patient: Linda Loredo    Procedure: Procedure(s):  Surgical debridement of all nonviable skin and soft tissue and bone left foot       Anesthesia Type:  General    Note:  Disposition: Inpatient   Postop Pain Control: Uneventful            Sign Out: Well controlled pain   PONV: No   Neuro/Psych: Uneventful            Sign Out: Acceptable/Baseline neuro status   Airway/Respiratory: Uneventful            Sign Out: Acceptable/Baseline resp. status   CV/Hemodynamics: Uneventful            Sign Out: Acceptable CV status; No obvious hypovolemia; No obvious fluid overload   Other NRE: NONE   DID A NON-ROUTINE EVENT OCCUR? No       Last vitals:  Vitals Value Taken Time   /66 09/26/23 1700   Temp     Pulse 78 09/26/23 1700   Resp 18 09/26/23 1700   SpO2 94 % 09/26/23 1708   Vitals shown include unvalidated device data.    Electronically Signed By: MOE Navarro CRNA  September 26, 2023  6:23 PM

## 2023-09-26 NOTE — ANESTHESIA PREPROCEDURE EVALUATION
Anesthesia Pre-Procedure Evaluation    Patient: Linda Loredo   MRN: [id number] : 1945        Procedure : Procedure(s):  Surgical debridement of all nonviable skin and soft tissue and bone left foot          Past Medical History:   Diagnosis Date    Depressive disorder, not elsewhere classified     Herpes zoster without mention of complication     Hypercalcemia 2007    Mild intermittent asthma     Other urinary incontinence     Type II or unspecified type diabetes mellitus with renal manifestations, uncontrolled(250.42) (H)     Type II or unspecified type diabetes mellitus without mention of complication, not stated as uncontrolled     Unspecified essential hypertension       Past Surgical History:   Procedure Laterality Date    CHOLECYSTECTOMY      ligation of fallopian tube      OPEN REDUCTION INTERNAL FIXATION ANKLE  10/13/11    left    pinning of left 2nd toe        Allergies   Allergen Reactions    Prednisone Nausea and Vomiting    Morphine Nausea and Vomiting    Morphine [Fumaric Acid] Nausea and Vomiting      Social History     Tobacco Use    Smoking status: Never    Smokeless tobacco: Never   Substance Use Topics    Alcohol use: No      Wt Readings from Last 1 Encounters:   23 120.5 kg (265 lb 10.5 oz)        Anesthesia Evaluation   Pt has had prior anesthetic. Type: General, Regional and MAC.        ROS/MED HX  ENT/Pulmonary:     (+) sleep apnea,          allergic rhinitis,         Intermittent, asthma    COPD,              Neurologic:  - neg neurologic ROS     Cardiovascular:     (+) Dyslipidemia hypertension- -  CAD -  - -   Taking blood thinners                     dysrhythmias, a-fib,        Previous cardiac testing   Echo: Date: Results:    Stress Test:  Date: Results:    ECG Reviewed:  Date: 23 Results:  Atrial fibrillation -occasional ectopic ventricular beat    -Poor R-wave progression -may be secondary to pulmonary disease  consider old anterior  infarct.  -Diffuse nonspecific T-abnormality.  Low voltage -possible pulmonary disease.  Cath:  Date: Results:      METS/Exercise Tolerance:     Hematologic:  - neg hematologic  ROS     Musculoskeletal: Comment: Fibromyalgia  Decub ulcer     (+)  arthritis,             GI/Hepatic:  - neg GI/hepatic ROS     Renal/Genitourinary:     (+) renal disease, type: CRI,            Endo: Comment: Hyperparathyroidism      (+)  type II DM,       Diabetic complications: neuropathy.      Obesity (Morbid),       Psychiatric/Substance Use:     (+) psychiatric history depression and anxiety       Infectious Disease:     (+)   MRSA (Urine 9/23/22),         Malignancy:  - neg malignancy ROS     Other:  - neg other ROS    (+)  , H/O Chronic Pain,         Physical Exam    Airway        Mallampati: II   TM distance: > 3 FB   Neck ROM: full   Mouth opening: > 3 cm    Respiratory Devices and Support         Dental           Cardiovascular   cardiovascular exam normal          Pulmonary   pulmonary exam normal            OUTSIDE LABS:  CBC:   Lab Results   Component Value Date    WBC 7.6 09/26/2023    WBC 8.2 09/25/2023    HGB 10.7 (L) 09/26/2023    HGB 9.8 (L) 09/25/2023    HCT 33.4 (L) 09/26/2023    HCT 29.3 (L) 09/25/2023     09/26/2023     09/25/2023     BMP:   Lab Results   Component Value Date     09/26/2023     09/23/2023    POTASSIUM 4.3 09/26/2023    POTASSIUM 3.5 09/23/2023    CHLORIDE 101 09/26/2023    CHLORIDE 101 09/23/2023    CO2 33 (H) 09/26/2023    CO2 27 09/23/2023    BUN 32.4 (H) 09/26/2023    BUN 51.0 (H) 09/23/2023    CR 1.15 (H) 09/26/2023    CR 1.11 (H) 09/25/2023     (H) 09/26/2023     (H) 09/26/2023     COAGS:   Lab Results   Component Value Date    PTT 23 06/16/2006    INR 1.61 (H) 09/26/2023     POC:   Lab Results   Component Value Date     (H) 04/27/2006     HEPATIC:   Lab Results   Component Value Date    ALBUMIN 3.1 (L) 09/22/2023    PROTTOTAL 6.7 09/22/2023    ALT  43 09/22/2023    AST 90 (H) 09/22/2023    ALKPHOS 82 09/22/2023    BILITOTAL 0.6 09/22/2023     OTHER:   Lab Results   Component Value Date    LACT 0.9 05/26/2023    A1C 7.0 (H) 09/23/2023    ZAKIA 9.7 09/26/2023    PHOS 3.3 06/20/2005    MAG 1.7 05/31/2023    TSH 2.36 10/15/2007    T4 1.11 09/05/2007    CRP 16.0 (H) 03/28/2008    SED 32 (H) 03/28/2008       Anesthesia Plan    ASA Status:  4, emergent       Anesthesia Type: General.   Induction: Propofol.   Maintenance: TIVA.        Consents    Anesthesia Plan(s) and associated risks, benefits, and realistic alternatives discussed. Questions answered and patient/representative(s) expressed understanding.     - Discussed: Risks, Benefits and Alternatives for BOTH SEDATION and the PROCEDURE were discussed     - Discussed with:  Patient            Postoperative Care    Pain management: IV analgesics, Oral pain medications, Multi-modal analgesia.   PONV prophylaxis: Ondansetron (or other 5HT-3), Dexamethasone or Solumedrol     Comments:                MOE oNrton CRNA

## 2023-09-26 NOTE — PLAN OF CARE
Goal Outcome Evaluation:    Patient alert and oriented. Vitals stable; on room air. Pain in L leg 6/10, partially relieved with scheduled methadone and repositioning. Appetite and oral fluid intake fair. Voiding spontaneously.     Wound care done by [facility] nurse at the beginning of this shift. Patient assisted with repositioned according to PIP plan; legs elevated on pillows and heels floating. PIV saline locked.    Patient is resolute in her desire to return home independently and is not interested in entertaining other housing options at this point. She is looking forward to speaking with social work tomorrow.     Patient is NPO at midnight in preparation for surgery tomorrow.    Temp: 97.8  F (36.6  C) Temp src: Oral BP: 110/52 Pulse: 76   Resp: 18 SpO2: 98 % O2 Device: None (Room air)

## 2023-09-27 ENCOUNTER — APPOINTMENT (OUTPATIENT)
Dept: GENERAL RADIOLOGY | Facility: CLINIC | Age: 78
DRG: 629 | End: 2023-09-27
Attending: STUDENT IN AN ORGANIZED HEALTH CARE EDUCATION/TRAINING PROGRAM
Payer: COMMERCIAL

## 2023-09-27 ENCOUNTER — APPOINTMENT (OUTPATIENT)
Dept: PHYSICAL THERAPY | Facility: CLINIC | Age: 78
DRG: 629 | End: 2023-09-27
Payer: COMMERCIAL

## 2023-09-27 LAB
ANION GAP SERPL CALCULATED.3IONS-SCNC: 9 MMOL/L (ref 7–15)
BUN SERPL-MCNC: 29.2 MG/DL (ref 8–23)
CALCIUM SERPL-MCNC: 9.5 MG/DL (ref 8.8–10.2)
CHLORIDE SERPL-SCNC: 101 MMOL/L (ref 98–107)
CREAT SERPL-MCNC: 1.03 MG/DL (ref 0.51–0.95)
DEPRECATED HCO3 PLAS-SCNC: 32 MMOL/L (ref 22–29)
EGFRCR SERPLBLD CKD-EPI 2021: 55 ML/MIN/1.73M2
ERYTHROCYTE [DISTWIDTH] IN BLOOD BY AUTOMATED COUNT: 12.5 % (ref 10–15)
GLUCOSE BLDC GLUCOMTR-MCNC: 156 MG/DL (ref 70–99)
GLUCOSE BLDC GLUCOMTR-MCNC: 164 MG/DL (ref 70–99)
GLUCOSE BLDC GLUCOMTR-MCNC: 171 MG/DL (ref 70–99)
GLUCOSE BLDC GLUCOMTR-MCNC: 237 MG/DL (ref 70–99)
GLUCOSE BLDC GLUCOMTR-MCNC: 241 MG/DL (ref 70–99)
GLUCOSE SERPL-MCNC: 172 MG/DL (ref 70–99)
HCT VFR BLD AUTO: 32.3 % (ref 35–47)
HGB BLD-MCNC: 10.5 G/DL (ref 11.7–15.7)
INR PPP: 1.4 (ref 0.85–1.15)
INR PPP: 1.4 (ref 0.85–1.15)
MCH RBC QN AUTO: 30.4 PG (ref 26.5–33)
MCHC RBC AUTO-ENTMCNC: 32.5 G/DL (ref 31.5–36.5)
MCV RBC AUTO: 94 FL (ref 78–100)
PLATELET # BLD AUTO: 204 10E3/UL (ref 150–450)
POTASSIUM SERPL-SCNC: 4.5 MMOL/L (ref 3.4–5.3)
RBC # BLD AUTO: 3.45 10E6/UL (ref 3.8–5.2)
SODIUM SERPL-SCNC: 142 MMOL/L (ref 135–145)
VANCOMYCIN SERPL-MCNC: 10.6 UG/ML
WBC # BLD AUTO: 9.1 10E3/UL (ref 4–11)

## 2023-09-27 PROCEDURE — 80202 ASSAY OF VANCOMYCIN: CPT | Performed by: STUDENT IN AN ORGANIZED HEALTH CARE EDUCATION/TRAINING PROGRAM

## 2023-09-27 PROCEDURE — 999N000065 XR CHEST PORT 1 VIEW

## 2023-09-27 PROCEDURE — 258N000003 HC RX IP 258 OP 636: Performed by: PODIATRIST

## 2023-09-27 PROCEDURE — 272N000025 HC KIT POWER PICC SINGLE LUMEN

## 2023-09-27 PROCEDURE — 250N000013 HC RX MED GY IP 250 OP 250 PS 637: Performed by: PODIATRIST

## 2023-09-27 PROCEDURE — 85027 COMPLETE CBC AUTOMATED: CPT | Performed by: STUDENT IN AN ORGANIZED HEALTH CARE EDUCATION/TRAINING PROGRAM

## 2023-09-27 PROCEDURE — 36415 COLL VENOUS BLD VENIPUNCTURE: CPT | Performed by: STUDENT IN AN ORGANIZED HEALTH CARE EDUCATION/TRAINING PROGRAM

## 2023-09-27 PROCEDURE — 97530 THERAPEUTIC ACTIVITIES: CPT | Mod: GP | Performed by: REHABILITATION PRACTITIONER

## 2023-09-27 PROCEDURE — 99232 SBSQ HOSP IP/OBS MODERATE 35: CPT | Performed by: STUDENT IN AN ORGANIZED HEALTH CARE EDUCATION/TRAINING PROGRAM

## 2023-09-27 PROCEDURE — 80048 BASIC METABOLIC PNL TOTAL CA: CPT | Performed by: STUDENT IN AN ORGANIZED HEALTH CARE EDUCATION/TRAINING PROGRAM

## 2023-09-27 PROCEDURE — 85610 PROTHROMBIN TIME: CPT | Performed by: STUDENT IN AN ORGANIZED HEALTH CARE EDUCATION/TRAINING PROGRAM

## 2023-09-27 PROCEDURE — 36569 INSJ PICC 5 YR+ W/O IMAGING: CPT

## 2023-09-27 PROCEDURE — 250N000011 HC RX IP 250 OP 636: Performed by: PODIATRIST

## 2023-09-27 PROCEDURE — 250N000013 HC RX MED GY IP 250 OP 250 PS 637: Performed by: STUDENT IN AN ORGANIZED HEALTH CARE EDUCATION/TRAINING PROGRAM

## 2023-09-27 PROCEDURE — 120N000001 HC R&B MED SURG/OB

## 2023-09-27 PROCEDURE — 85610 PROTHROMBIN TIME: CPT | Performed by: PODIATRIST

## 2023-09-27 RX ORDER — CEFAZOLIN SODIUM 1 G/50ML
1250 SOLUTION INTRAVENOUS
Status: DISCONTINUED | OUTPATIENT
Start: 2023-09-28 | End: 2023-09-29 | Stop reason: HOSPADM

## 2023-09-27 RX ORDER — WARFARIN SODIUM 5 MG/1
5 TABLET ORAL
Status: COMPLETED | OUTPATIENT
Start: 2023-09-27 | End: 2023-09-27

## 2023-09-27 RX ORDER — LIDOCAINE 40 MG/G
CREAM TOPICAL
Status: DISCONTINUED | OUTPATIENT
Start: 2023-09-27 | End: 2023-09-29 | Stop reason: HOSPADM

## 2023-09-27 RX ORDER — SENNOSIDES 8.6 MG
8.6 TABLET ORAL 2 TIMES DAILY PRN
Status: DISCONTINUED | OUTPATIENT
Start: 2023-09-27 | End: 2023-09-29 | Stop reason: HOSPADM

## 2023-09-27 RX ADMIN — CEFTRIAXONE SODIUM 1 G: 1 INJECTION, POWDER, FOR SOLUTION INTRAMUSCULAR; INTRAVENOUS at 13:21

## 2023-09-27 RX ADMIN — SENNOSIDES 8.6 MG: 8.6 TABLET, FILM COATED ORAL at 14:23

## 2023-09-27 RX ADMIN — INSULIN ASPART 1 UNITS: 100 INJECTION, SOLUTION INTRAVENOUS; SUBCUTANEOUS at 08:28

## 2023-09-27 RX ADMIN — METHADONE HYDROCHLORIDE 10 MG: 5 TABLET ORAL at 14:23

## 2023-09-27 RX ADMIN — METHADONE HYDROCHLORIDE 5 MG: 5 TABLET ORAL at 11:01

## 2023-09-27 RX ADMIN — TORSEMIDE 15 MG: 5 TABLET ORAL at 08:27

## 2023-09-27 RX ADMIN — INSULIN ASPART 1 UNITS: 100 INJECTION, SOLUTION INTRAVENOUS; SUBCUTANEOUS at 12:26

## 2023-09-27 RX ADMIN — WARFARIN SODIUM 5 MG: 5 TABLET ORAL at 19:53

## 2023-09-27 RX ADMIN — OXYCODONE HYDROCHLORIDE AND ACETAMINOPHEN 500 MG: 500 TABLET ORAL at 08:27

## 2023-09-27 RX ADMIN — LISINOPRIL 5 MG: 5 TABLET ORAL at 08:27

## 2023-09-27 RX ADMIN — METHADONE HYDROCHLORIDE 5 MG: 5 TABLET ORAL at 06:28

## 2023-09-27 RX ADMIN — TORSEMIDE 15 MG: 5 TABLET ORAL at 19:54

## 2023-09-27 RX ADMIN — VANCOMYCIN HYDROCHLORIDE 1250 MG: 5 INJECTION, POWDER, LYOPHILIZED, FOR SOLUTION INTRAVENOUS at 14:23

## 2023-09-27 RX ADMIN — SIMVASTATIN 20 MG: 20 TABLET, FILM COATED ORAL at 22:22

## 2023-09-27 RX ADMIN — METHADONE HYDROCHLORIDE 5 MG: 5 TABLET ORAL at 19:53

## 2023-09-27 RX ADMIN — INSULIN ASPART 1 UNITS: 100 INJECTION, SOLUTION INTRAVENOUS; SUBCUTANEOUS at 18:58

## 2023-09-27 ASSESSMENT — ACTIVITIES OF DAILY LIVING (ADL)
ADLS_ACUITY_SCORE: 32

## 2023-09-27 NOTE — CONSULTS
Pharmacy Vancomycin Note  Date of Service 2023  Patient's  1945   78 year old, female    Indication: Osteomyelitis and Skin and Soft Tissue Infection  Day of Therapy: 5  Current vancomycin regimen:  1250 mg IV q24h  Current vancomycin monitoring method: AUC  Current vancomycin therapeutic monitoring goal: 400-600 mg*h/L    Insight[employer] Prediction of Current Vancomycin Regimen  Loading dose: N/A  Regimen: 1250 mg IV every 24 hours.  Start time: 15:14 on 2023  Exposure target: AUC24 (range)400-600 mg/L.hr   AUC24,ss: 373 mg/L.hr  Probability of AUC24 > 400: 35 %  Ctrough,ss: 11.4 mg/L  Probability of Ctrough,ss > 20: 1 %  Probability of nephrotoxicity (Lodise COSTA ): 7 %      Current estimated CrCl = Estimated Creatinine Clearance: 62.4 mL/min (A) (based on SCr of 1.03 mg/dL (H)).    Creatinine for last 3 days  2023:  5:00 AM Creatinine 1.11 mg/dL  2023:  5:45 AM Creatinine 1.15 mg/dL  2023:  5:01 AM Creatinine 1.03 mg/dL    Recent Vancomycin Levels (past 3 days)  2023:  1:27 PM Vancomycin 10.6 ug/mL    Vancomycin IV Administrations (past 72 hours)                     vancomycin (VANCOCIN) 1,250 mg in sodium chloride 0.9 % 250 mL intermittent infusion (mg) 1,250 mg New Bag 23 1514    vancomycin (VANCOCIN) 1,250 mg in sodium chloride 0.9 % 250 mL intermittent infusion (mg) 1,250 mg New Bag 23 0201                    Nephrotoxins and other renal medications (From now, onward)      Start     Dose/Rate Route Frequency Ordered Stop    23 1400  vancomycin (VANCOCIN) 1,250 mg in sodium chloride 0.9 % 250 mL intermittent infusion        Note to Pharmacy: Interval changed to q24h per pharmacy protocol.    1,250 mg  over 90 Minutes Intravenous EVERY 24 HOURS 23 1313      23 1200  lisinopril (ZESTRIL) tablet 5 mg        Note to Pharmacy: PTA Sig:Take 5 mg by mouth daily      5 mg Oral DAILY 23 1137      23 0930  torsemide (DEMADEX) tablet  15 mg        Note to Pharmacy: PTA Sig:Take 15 mg by mouth 2 times daily      15 mg Oral 2 TIMES DAILY 09/24/23 0852                 Contrast Orders - past 72 hours (72h ago, onward)      None            Interpretation of levels and current regimen:  Vancomycin level is reflective of subtherapeutic level    Has serum creatinine changed greater than 50% in last 72 hours: No    Urine output:  unable to determine    Renal Function: Improving    InsightRX Prediction of Planned New Vancomycin Regimen  Loading dose: N/A  Regimen: 1250 mg IV every 18 hours.  Start time: 15:14 on 09/28/2023  Exposure target: AUC24 (range)400-600 mg/L.hr   AUC24,ss: 495 mg/L.hr  Probability of AUC24 > 400: 88 %  Ctrough,ss: 16.3 mg/L  Probability of Ctrough,ss > 20: 17 %  Probability of nephrotoxicity (Lodise COSTA 2009): 12 %    Plan:  Increase Dose to 1250 mg every 18 hours.  Vancomycin monitoring method: AUC  Vancomycin therapeutic monitoring goal: 400-600 mg*h/L  Pharmacy will check vancomycin levels as appropriate in 1-3 Days.  Serum creatinine levels will be ordered a minimum of twice weekly.    Jamshid Rivera RPH on 9/27/2023 at 2:14 PM

## 2023-09-27 NOTE — PROGRESS NOTES
Shift Summary    Reason for admission: Hx of A fib    Neuro: Alert and oriented x4. VSS.     Activity: Assist of 1-2 with transfers, ceiling lift.     Diet: Regular    Cardiac: WNL. Apical pulse audible. Denies chest pain.    Respiratory: Denies SOB. O2 sats 96% on room air. LS clear bilaterally.    Skin: Blanchable. Mepilex CDI x2. RLE dressing CDI. BLE elevated. Wraps to BLE in place and CDI.    LDA's: LUE PIV. LR infusing at 10 ml/hr.    Pain: Denies.     Plan: Anticipated discharge in 1-2 days to TCU. TCU referral pending.

## 2023-09-27 NOTE — PROGRESS NOTES
"A&Ox4, Ceiling lift, Pain 6/10. Returned from surgery this afternoon with legs wrapped. Heels elevated. Repositioning/weight shifting frequently due to pressure injury on backside. Mepilex applied x2. Right LE dressing completed. vital signs: Blood pressure 100/55, pulse 69, temperature 97.5  F (36.4  C), temperature source Oral, resp. rate 18, height 1.753 m (5' 9\"), weight 120.2 kg (265 lb), SpO2 96 %  "

## 2023-09-27 NOTE — PROGRESS NOTES
Care Management Follow Up    Length of Stay (days): 4    Expected Discharge Date: 09/28/2023     Concerns to be Addressed: adjustment to diagnosis/illness, basic needs     Patient plan of care discussed at interdisciplinary rounds: Yes    Anticipated Discharge Disposition: Transitional Care     Anticipated Discharge Services: Transportation Services  Anticipated Discharge DME: None    Patient/family educated on Medicare website which has current facility and service quality ratings: yes  Education Provided on the Discharge Plan: Yes  Patient/Family in Agreement with the Plan: other (see comments) ([place] apartment)    Referrals Placed by CM/SW: External Care Coordination, Post Acute Facilities, Transportation  Private pay costs discussed: Not applicable    Additional Information:    Per IDT rounds, MD team states that patient is not medically ready for discharge today, will likely be ready for discharge in 1-2 days.    TCU referrals extended today.     Destination    Service Provider Request Status Selected Services Address Phone Fax Patient Preferred   [facility]  Pending - Request Sent N/A [address] [facility] [address] [phone] --   Internal Comment last updated by Keith Berg MA 9/27/2023 0932    9/27 0850 sent email to Trini gary  9/25 0850 sent email to Trini gary            [facility](U)  Pending - Request Sent N/A [address] [facility] [address] [phone] --   [facility] ([facility])  Pending - Request Sent N/A [address] [facility] [id number] [phone] --   Current Capacity last updated by Sary Barger MA on 9/12/2023 1052    LTC full- check back in 1-2 weeks, as of 9/12 - need FULL referral, d/t not having Epic access per Toribio VARGHESE [place] ()  Pending - Request Sent N/A [address] [facility] [address] [phone] --   Current Capacity last updated by Sary Barger MA on 8/15/2023 1030    LTC wait list is 90 patients long            [facility] ([facility])  Pending - Request Sent N/A [address] [facility] [address] [phone] --   Current Capacity last updated by Juan Briggs RN on 9/8/2023 1459    No weekend admits            [facility] ([facility])  Pending - Request Sent N/A [address] [facility] [address] [phone] --   [facility] ([facility])  Pending - Request Sent N/A [address] [facility] [address] [phone] --   Current Capacity last updated by Velma Milan MA on 8/21/2023 0948    No [employer], No [employer], LTC ph. [phone]            [facility] ()  Pending - Request Sent N/A [address] [facility] [id number] [phone] --   [facility] ([facility])  Pending - Request Sent N/A [address] [facility] [id number] [phone] --   Current Capacity last updated by Velma Milan MA on 9/19/2023 0903    Admissions: [phone]. No MA pending            [facility] ([facility])  Pending - Request Sent N/A [address] [facility] [id number] [phone] --   Current Capacity last updated by Mel Reeder on 9/22/2023 1604    Does not accepted AETNA insurance.            [facility]  Pending - Request Sent N/A [address] [facility] [address] [phone] --   [facility] ()  Pending - Request Sent N/A [address] [facility] [address] [phone] --   Current Capacity last updated by Velma Milan MA on 9/26/2023 1043    9/26: No TCU beds available for a least 2 weeks.            [facility]  Pending - Request Sent N/A [address], [place] [address] [phone] --   [facility] (SNF)  Pending - Request Sent N/A [address] [place] [address] [phone] --   [facility] (SNF)  Pending - Request Sent N/A [address] [address] [phone] --   [facility] (SNF)  Pending - Request Sent N/A [address] [facility] [address] [phone] --   [facility] - REFERRAL ONLY (SNF)  Declined  Patient History, pt hx at previous [facility] TCU stay N/A [address] [facility] [address] [phone] --   [facility] ()  Declined  Bed Not Available N/A [address] [address] [phone] --   Internal Comment last updated by Sary Barger MA 9/27/2023 1010    9/27 0914 follow-up email sent to Alfred  9/26 No female beds at this time-  9/26 0841 follow-up email sent-jeremías  9/25 0843 sent a follow-up email to Alfred STODDARD RN

## 2023-09-27 NOTE — PROGRESS NOTES
[facility]    Medicine Progress Note - Hospitalist Service    Date of Admission:  9/22/2023    Assessment & Plan      Linda Loredo is a 78 year old lady w/ hx of HTN, CKD III, HLD, Vit D def, obesity, pAfib on coumadin, DM2, venous stasis brought in by police after welfare check found to have leg swelling and redness raising concern for cellulitis vs worsening of her chronic venous stasis ulcers. Left heel debrided in OR 9/26 with podiatry revealing osteomyelitis. Remains on broad spectrum antibiotics with cultures pending.      # Decubitus ulcer of left heal   # Left heel osteomyelitis   # Left foot cellulitis, improving   # Chronic venous statis of lower extremities   # Multiple bodily ulcers  # Nasal MRSA +VE  # Urine positive for Enteric faecalis, Staphylococcus aureus, and E. coli   Patient has a long history of venous stasis ulcers initially unclear if her current ulcers are acutely changed however she was found to have an infected decubitus ulcer of her left heel with osteomyelitis. Initial leukocytosis to 14 and pro-calcitonin to 0.27 on presentation though leukocytosis rapidly resolved.. Cultures results pending from debrided tissue plan to further discuss antibiotic plan pending these results.   - Continued Vancomycin and ceftriaxone  - Podiatry consulted and following   - Gram stain with gram positive cocci and bacilli, speciation sensitivities pending  - Blood cultures from 9/25 without growth to date, final results pending   - Plan to discuss antibiotic plan with infectious disease pending culture data  - Ordering PICC line today anticipate 6 weeks of antibiotics  - Continued PTA Torsemide 15 mg every day   - Continued wound care   - Continued PTA methadone  - Continued PTA Oxycodone 5 mg Q6H prn   - May require placement however patient resistant to this idea     # DM2  A1c 7.0 9/23/23. On deguldec 35 units HS PTA. Has had minimal sliding scale requirement.   -  "ISS for now / accuchecks / ADA diet  - Holding PTA Metformin   - Holding PTA Ozempic   - Holding PTA Degludec     # CKD III  # Hypercalcemia   - Daily bmp     # pAfib  # Supratherapeutic INR, resolved   Ventricular rate controlled  Restarting warfarin post procedure    # Hx HTN  - Continued PTA lisinopril 5 mg     # Dyslipidemia   - Continued PTA simvastatin 20 mg every day     #Constipation  -Senna  -Patient in agreement with starting MiraLAX tomorrow if needed       Diet: Snacks/Supplements Adult: Glucerna; With Meals  Regular Diet Adult    DVT Prophylaxis: Warfarin  Braga Catheter: Not present  Lines: None     Cardiac Monitoring: None  Code Status: Full Code      Clinically Significant Risk Factors              # Hypoalbuminemia: Lowest albumin = 3.1 g/dL at 9/22/2023  7:41 PM, will monitor as appropriate       # Hypertension: Noted on problem list       # DMII: A1C = 7.0 % (Ref range: <5.7 %) within past 6 months       # Obesity: Estimated body mass index is 39.13 kg/m  as calculated from the following:    Height as of this encounter: 1.753 m (5' 9\").    Weight as of this encounter: 120.2 kg (265 lb).             Disposition Plan     Expected Discharge Date: 09/27/2023      Destination: inpatient rehabilitation facility            Zacarias Holliday MD  Hospitalist Service  [facility]  Securely message with [employer] (more info)  Text page via [employer] Paging/Directory   ______________________________________________________________________    Interval History   Reports feeling well this morning.  Denies fevers chills or night sweats.  Denies any changes in her foot or ankle pain.    Physical Exam   Vital Signs: Temp: 97.9  F (36.6  C) Temp src: Oral BP: 112/48 Pulse: 63   Resp: 18 SpO2: 95 % O2 Device: None (Room air)    Weight: 265 lbs 0 oz    General Appearance: Awake and alert sitting up in bed in no acute distress   Respiratory: Breathing easily on room air   Cardiovascular: Irregular " rhythm and regular rate extremities warm and well perfused.   GI: Abdomen soft non-tender and non-distended   Skin: LE wrapped with some erythema and edema on shins, left foot wrapped , tenderness to palpation of bilateral lower extremities though unchanged from days prior       Medical Decision Making       45 MINUTES SPENT BY ME on the date of service doing chart review, history, exam, documentation & further activities per the note.      Data     I have personally reviewed the following data over the past 24 hrs:    9.1  \   10.5 (L)   / 204     142 101 29.2 (H) /  172 (H)   4.5 32 (H) 1.03 (H) \     INR:  1.40 (H) PTT:  N/A   D-dimer:  N/A Fibrinogen:  N/A

## 2023-09-27 NOTE — PROGRESS NOTES
"Pt A & O x 4, able to make needs known, on room air, pain 6/10 this is chronic for pt. Carb count, I/O, uses call light, pt does not want to get into chair, did state that tomorrow she will do so. Education provided on the importance of shifting weight to help prevent further skin break down, PIV in L upper arm, infusing antibiotics, running 10 ml/h LR after completion of Vanco, prn bowel medication given at pt has not had bowel movement in days. Wound care completed this shift, bed bath given, linen changed. BP 96/55 (BP Location: Right arm)   Pulse 71   Temp 98.2  F (36.8  C) (Oral)   Resp 18   Ht 1.753 m (5' 9\")   Wt 120.2 kg (265 lb)   SpO2 98%   BMI 39.13 kg/m     "

## 2023-09-27 NOTE — CONSULTS
Clinical Pharmacy - Warfarin Dosing Consult     Pharmacy has been consulted to manage this patient s warfarin therapy.  Indication: Atrial Fibrillation  Therapy Goal: INR 2-3  Provider/Team: [facility]  Warfarin Prior to Admission: Yes  Warfarin PTA Regimen: 2.5mg Mon/Wed/Sat, 3.75mg ROW  Recent documented change in oral intake/nutrition: Unknown  Dose Comments: INR 1.40 - held x2 doses. Increase to 5 mg today.    INR   Date Value Ref Range Status   09/27/2023 1.40 (H) 0.85 - 1.15 Final   09/26/2023 1.61 (H) 0.85 - 1.15 Final       Recommend warfarin 5 mg today.  Pharmacy will monitor Linda Loredo daily and order warfarin doses to achieve specified goal.      Please contact pharmacy as soon as possible if the warfarin needs to be held for a procedure or if the warfarin goals change.    Jamshid Rivera RPH on 9/27/2023 at 1:19 PM

## 2023-09-27 NOTE — PROGRESS NOTES
POD# 1: Surgical debridement decubitus heel ulcer left lower extremity    The patient was seen at bedside in no apparent distress.  The patient relates a good appetite with no nausea or vomiting.  The patient denies any fever or chills.  The patient relates the pain is under control.  The patient relates being able to go the the bathroom.     The patient's vitals are stable and is affebrile    Operative Cultures:   Soft tissue left heel  Gram stain: 3+ gram-positive cocci; 3+ gram-negative bacilli  Aerobic: Pending  Anaerobic: Pending    Bone, calcaneus, left  Gram stain: 2+ gram-positive cocci; 1+ gram-positive bacilli; 1+ gram-negative bacilli; 2+ WBC  Aerobic: Pending  Anaerobic: Pending    Labs:   WBC: 9.1   RBC: 3.45   Hgb: 10.5   Hematocrit: 32.3     Glucose: 172    Exam:  Neurovascular status remains intact with positive epicritic sensation and capillary filling to the digits on the left.  Motor is intact to the left.       The dressing appears clean dry and intact.  No strikethrough noted.    Assessment: Diabetic decubitus heel infection status post surgical debridement of decubitus heel ulcer left lower extremity    Plan:     Wound: Keep dressing clean dry and intact.  Reinforce if noted strikethrough. WOC RN to evaluate wound tomorrow.  Infection: Continue IV antibiotic therapy pending cultures and sensitivities.  Formal infectious disease consult will be needed due to calcaneal bone infection.  Will likely require long-term IV antibiotic therapy requiring PICC line placement.  Disposition: Patient is to remain nonweightbearing on the left foot.  Keep the left foot elevated on 2 pillows.    I appreciate the assistance in the care of this patient from the Hospitalist Service.      MARGO Thorne, LARRY, FACFAS

## 2023-09-28 ENCOUNTER — APPOINTMENT (OUTPATIENT)
Dept: ULTRASOUND IMAGING | Facility: CLINIC | Age: 78
DRG: 629 | End: 2023-09-28
Attending: STUDENT IN AN ORGANIZED HEALTH CARE EDUCATION/TRAINING PROGRAM
Payer: COMMERCIAL

## 2023-09-28 ENCOUNTER — APPOINTMENT (OUTPATIENT)
Dept: PHYSICAL THERAPY | Facility: CLINIC | Age: 78
DRG: 629 | End: 2023-09-28
Payer: COMMERCIAL

## 2023-09-28 ENCOUNTER — APPOINTMENT (OUTPATIENT)
Dept: WOUND CARE | Facility: CLINIC | Age: 78
DRG: 629 | End: 2023-09-28
Payer: COMMERCIAL

## 2023-09-28 LAB
ANION GAP SERPL CALCULATED.3IONS-SCNC: 9 MMOL/L (ref 7–15)
BACTERIA SPEC CULT: ABNORMAL
BACTERIA SPEC CULT: ABNORMAL
BUN SERPL-MCNC: 29.6 MG/DL (ref 8–23)
CALCIUM SERPL-MCNC: 9 MG/DL (ref 8.8–10.2)
CHLORIDE SERPL-SCNC: 97 MMOL/L (ref 98–107)
CREAT SERPL-MCNC: 1.11 MG/DL (ref 0.51–0.95)
EGFRCR SERPLBLD CKD-EPI 2021: 51 ML/MIN/1.73M2
ERYTHROCYTE [DISTWIDTH] IN BLOOD BY AUTOMATED COUNT: 12.5 % (ref 10–15)
GLUCOSE BLDC GLUCOMTR-MCNC: 182 MG/DL (ref 70–99)
GLUCOSE BLDC GLUCOMTR-MCNC: 191 MG/DL (ref 70–99)
GLUCOSE BLDC GLUCOMTR-MCNC: 195 MG/DL (ref 70–99)
GLUCOSE BLDC GLUCOMTR-MCNC: 211 MG/DL (ref 70–99)
GLUCOSE BLDC GLUCOMTR-MCNC: 234 MG/DL (ref 70–99)
GLUCOSE SERPL-MCNC: 194 MG/DL (ref 70–99)
HCO3 SERPL-SCNC: 31 MMOL/L (ref 22–29)
HCT VFR BLD AUTO: 30.6 % (ref 35–47)
HGB BLD-MCNC: 9.9 G/DL (ref 11.7–15.7)
INR PPP: 1.34 (ref 0.85–1.15)
MCH RBC QN AUTO: 30.5 PG (ref 26.5–33)
MCHC RBC AUTO-ENTMCNC: 32.4 G/DL (ref 31.5–36.5)
MCV RBC AUTO: 94 FL (ref 78–100)
PATH REPORT.COMMENTS IMP SPEC: NORMAL
PATH REPORT.COMMENTS IMP SPEC: NORMAL
PATH REPORT.FINAL DX SPEC: NORMAL
PATH REPORT.GROSS SPEC: NORMAL
PATH REPORT.MICROSCOPIC SPEC OTHER STN: NORMAL
PATH REPORT.RELEVANT HX SPEC: NORMAL
PHOTO IMAGE: NORMAL
PLATELET # BLD AUTO: 178 10E3/UL (ref 150–450)
POTASSIUM SERPL-SCNC: 4 MMOL/L (ref 3.4–5.3)
RBC # BLD AUTO: 3.25 10E6/UL (ref 3.8–5.2)
SODIUM SERPL-SCNC: 137 MMOL/L (ref 135–145)
WBC # BLD AUTO: 8.5 10E3/UL (ref 4–11)

## 2023-09-28 PROCEDURE — 250N000011 HC RX IP 250 OP 636: Mod: JZ | Performed by: STUDENT IN AN ORGANIZED HEALTH CARE EDUCATION/TRAINING PROGRAM

## 2023-09-28 PROCEDURE — 85014 HEMATOCRIT: CPT | Performed by: STUDENT IN AN ORGANIZED HEALTH CARE EDUCATION/TRAINING PROGRAM

## 2023-09-28 PROCEDURE — 250N000013 HC RX MED GY IP 250 OP 250 PS 637: Performed by: PODIATRIST

## 2023-09-28 PROCEDURE — 80048 BASIC METABOLIC PNL TOTAL CA: CPT | Performed by: STUDENT IN AN ORGANIZED HEALTH CARE EDUCATION/TRAINING PROGRAM

## 2023-09-28 PROCEDURE — 93922 UPR/L XTREMITY ART 2 LEVELS: CPT

## 2023-09-28 PROCEDURE — G0463 HOSPITAL OUTPT CLINIC VISIT: HCPCS

## 2023-09-28 PROCEDURE — 120N000001 HC R&B MED SURG/OB

## 2023-09-28 PROCEDURE — 85610 PROTHROMBIN TIME: CPT | Performed by: PODIATRIST

## 2023-09-28 PROCEDURE — 250N000013 HC RX MED GY IP 250 OP 250 PS 637: Performed by: STUDENT IN AN ORGANIZED HEALTH CARE EDUCATION/TRAINING PROGRAM

## 2023-09-28 PROCEDURE — 99232 SBSQ HOSP IP/OBS MODERATE 35: CPT | Performed by: STUDENT IN AN ORGANIZED HEALTH CARE EDUCATION/TRAINING PROGRAM

## 2023-09-28 PROCEDURE — 88307 TISSUE EXAM BY PATHOLOGIST: CPT | Mod: 26 | Performed by: PATHOLOGY

## 2023-09-28 PROCEDURE — 88305 TISSUE EXAM BY PATHOLOGIST: CPT | Mod: 26 | Performed by: PATHOLOGY

## 2023-09-28 PROCEDURE — 258N000003 HC RX IP 258 OP 636: Performed by: STUDENT IN AN ORGANIZED HEALTH CARE EDUCATION/TRAINING PROGRAM

## 2023-09-28 PROCEDURE — 97530 THERAPEUTIC ACTIVITIES: CPT | Mod: GP | Performed by: PHYSICAL MEDICINE & REHABILITATION

## 2023-09-28 RX ORDER — CEFEPIME HYDROCHLORIDE 2 G/1
2 INJECTION, POWDER, FOR SOLUTION INTRAVENOUS EVERY 12 HOURS
Status: DISCONTINUED | OUTPATIENT
Start: 2023-09-28 | End: 2023-09-29

## 2023-09-28 RX ORDER — METRONIDAZOLE 500 MG/1
500 TABLET ORAL 3 TIMES DAILY
Status: DISCONTINUED | OUTPATIENT
Start: 2023-09-28 | End: 2023-09-29

## 2023-09-28 RX ORDER — WARFARIN SODIUM 5 MG/1
5 TABLET ORAL
Status: COMPLETED | OUTPATIENT
Start: 2023-09-28 | End: 2023-09-28

## 2023-09-28 RX ADMIN — METRONIDAZOLE 500 MG: 500 TABLET ORAL at 20:30

## 2023-09-28 RX ADMIN — SENNOSIDES 8.6 MG: 8.6 TABLET, FILM COATED ORAL at 09:17

## 2023-09-28 RX ADMIN — INSULIN ASPART 1 UNITS: 100 INJECTION, SOLUTION INTRAVENOUS; SUBCUTANEOUS at 12:45

## 2023-09-28 RX ADMIN — METHADONE HYDROCHLORIDE 5 MG: 5 TABLET ORAL at 05:51

## 2023-09-28 RX ADMIN — SENNOSIDES 8.6 MG: 8.6 TABLET, FILM COATED ORAL at 22:15

## 2023-09-28 RX ADMIN — CEFEPIME 2 G: 2 INJECTION, POWDER, FOR SOLUTION INTRAVENOUS at 22:15

## 2023-09-28 RX ADMIN — VANCOMYCIN HYDROCHLORIDE 1250 MG: 5 INJECTION, POWDER, LYOPHILIZED, FOR SOLUTION INTRAVENOUS at 09:28

## 2023-09-28 RX ADMIN — TORSEMIDE 15 MG: 5 TABLET ORAL at 09:18

## 2023-09-28 RX ADMIN — INSULIN ASPART 1 UNITS: 100 INJECTION, SOLUTION INTRAVENOUS; SUBCUTANEOUS at 09:17

## 2023-09-28 RX ADMIN — OXYCODONE HYDROCHLORIDE AND ACETAMINOPHEN 500 MG: 500 TABLET ORAL at 09:17

## 2023-09-28 RX ADMIN — LISINOPRIL 5 MG: 5 TABLET ORAL at 09:17

## 2023-09-28 RX ADMIN — SIMVASTATIN 20 MG: 20 TABLET, FILM COATED ORAL at 21:42

## 2023-09-28 RX ADMIN — WARFARIN SODIUM 5 MG: 5 TABLET ORAL at 17:25

## 2023-09-28 RX ADMIN — METHADONE HYDROCHLORIDE 5 MG: 5 TABLET ORAL at 20:30

## 2023-09-28 RX ADMIN — METHADONE HYDROCHLORIDE 10 MG: 5 TABLET ORAL at 14:48

## 2023-09-28 RX ADMIN — INSULIN ASPART 1 UNITS: 100 INJECTION, SOLUTION INTRAVENOUS; SUBCUTANEOUS at 17:24

## 2023-09-28 RX ADMIN — TORSEMIDE 15 MG: 5 TABLET ORAL at 20:30

## 2023-09-28 RX ADMIN — CEFEPIME 2 G: 2 INJECTION, POWDER, FOR SOLUTION INTRAVENOUS at 12:45

## 2023-09-28 RX ADMIN — METRONIDAZOLE 500 MG: 500 TABLET ORAL at 13:40

## 2023-09-28 RX ADMIN — METHADONE HYDROCHLORIDE 5 MG: 5 TABLET ORAL at 10:52

## 2023-09-28 ASSESSMENT — ACTIVITIES OF DAILY LIVING (ADL)
ADLS_ACUITY_SCORE: 32

## 2023-09-28 NOTE — PLAN OF CARE
Goal Outcome Evaluation:    Patient alert and oriented. Vitals stable; on room air. Pain in L leg 6/10, partially relieved with scheduled methadone and repositioning. Good appetite and oral fluid intake. Voiding spontaneously; no BM.    Fine crackles in bases of lungs bilaterally but no cough or change in O2 saturation at room air. Patient working on deep breathing and coughing independently. Fluids discontinued.     Patient assisted with repositioning side-to-side according to PIP plan; legs elevated on pillows and heels floating. Surgical dressing and ACE wrap clean, dry, and intact.    PICC placed in R arm this evening. PIV removed.    Temp: 98.8  F (37.1  C) Temp src: Oral BP: 114/58 Pulse: 93   Resp: 18 SpO2: 98 % O2 Device: None (Room air)

## 2023-09-28 NOTE — PROGRESS NOTES
POD# 2: Surgical debridement decubitus heel ulcer left lower extremity    The patient was seen at bedside in no apparent distress.  The patient relates a good appetite with no nausea or vomiting.  The patient denies any fever or chills.  The patient relates the pain is under control.  The patient relates being able to go the the bathroom.     The patient's vitals are stable and is affebrile    Operative Cultures:   Soft tissue left heel  Gram stain: 3+ gram-positive cocci; 3+ gram-negative bacilli  Aerobic: 4+ Proteus mirabilis; 4+ Enterococcus faecalis  Anaerobic: No organisms seen    Bone, calcaneus, left  Gram stain: 2+ gram-positive cocci; 1+ gram-positive bacilli; 1+ gram-negative bacilli; 2+ WBC  Aerobic: 2+ Pseudomonas aeruginosa; 1+ Morganella morganii; 2+ Enterococcus faecalis; 1+ Proteus mirabilis  Anaerobic: No organisms seen    Labs:   WBC: 8.5   RBC: 3.25   Hgb: 9.9   Hematocrit: 30.6     Glucose: 194    Exam:  Neurovascular status remains intact with positive epicritic sensation and capillary filling to the digits on the left.  Motor is intact to the left.       The dressing appears clean dry and intact.  No strikethrough noted.    Assessment: Diabetic decubitus heel infection status post surgical debridement of decubitus heel ulcer left lower extremity    Plan:     Wound: Keep dressing clean dry and intact.  Reinforce if noted strikethrough. WOC RN to evaluate wound today.  Infection: Continue IV antibiotic therapy pending sensitivities.  Formal infectious disease consult will be needed due to calcaneal bone infection.  Will likely require long-term IV antibiotic therapy requiring PICC line placement.  Disposition: Patient is to remain nonweightbearing on the left foot.  Keep the left foot elevated on 2 pillows.  The patient will follow up in wound care clinic after discharge.    I appreciate the assistance in the care of this patient from the Hospitalist Service.      MARGO Thorne, LARRY, FACFAS

## 2023-09-28 NOTE — PROGRESS NOTES
"Pt A & O x 4, pain 6-8/10, pt does not want additional pain management next to her scheduled methadone, bowel medication given this morning, no BM, audible bowel sounds, WOC at bedside to complete wound care, pure wick in place, however complete bed change do to leaking, on room air, able to make needs known, CHG bath given along with morning cares, /51 (BP Location: Left arm)   Pulse 94   Temp 98.2  F (36.8  C) (Oral)   Resp 18   Ht 1.753 m (5' 9\")   Wt 120.2 kg (265 lb)   SpO2 94%   BMI 39.13 kg/m     "

## 2023-09-28 NOTE — PROGRESS NOTES
[facility]  WO Nurse Inpatient Assessment     Consulted for: Legs, Coccyx    Summary: All wounds on legs and buttocks are improved.    Left IT area DTI has evolved to a stage III, left buttock appears to be stage II.   Bilateral leg wounds are clean and granular and edema is much improved.   Left heel wound is clean with exposed bone and odor noted-awaiting bone cultures    Recommendations:  Initiating triad paste then foam bandage to IT wound for debridement. Using foam dressing over left buttock also for protection.    Continue same plan of care for legs but discontinue vaseline gauze as not needed at this time.  Will need a plan for compression therapy prior to discharge. At this time recommending an ALMA ultrasound to rule out PAD, (likely ok as had normal ALMA in May of '22 but given history of poor tolerance to compression in the past and poor healing do recommend). If no concerns on ALMA then recommend lymphedema therapy consult for compression wrapping as inpatient if possible. Pt will need to discharge to a facility with lymphedema therapy certified staff for ongoing compression therapy management for healing stasis ulcers of legs.     Vashe moist gauze to left heel changed daily and as needed, recommending negative pressure wound therapy (wound VAC,) with contact layer over bone once bone cultures are back and pt is determined to be on appropriate antibiotic therapy AND wound odor is resolved.  Recommend heel wedge shoe (orthotics and prosthetics consult,) if pt not able to transfer without bearing weight on this foot. DH walker for right foot. Order placed for orthotics consult.     Writer spoke with care management regarding anticipated needs at TCU as above.     As patient doesn't tolerate the head of the bed flat for boosting, she is at higher risk for further friction/shearing to her buttocks/sacrum/coccyx. Continue to encourage the head of bed to be as low as she can  tolerate and elevate legs on pillows while using knee gatch to decrease risk for shearing/friction as much as possible. Writer did have conversation with pt today regarding need to get into chair/out of bed and have HOB lower as much as able with rationale.  Also recommending TCU PT work with her on wheelchair seating and appropriate cushion as she states the one she has slides in the chair.        Patient History (according to provider note(s):      Linda Loredo is a 78 year old lady w/ hx of HTN, CKD III, HLD, Vit D def, obesity, pAfib on coumadin, DM2, venous stasis brought in by police after welfare check. Pt found on the floor, covered in excrement. History was limited as pt was lethargic and falling asleep. Hx was primarily obtained by the ER notes. Pt reports profound weakness erich of the legs w/ b/l leg apin.     # Likely chronic venous statis more than Cellulitis.  # Multiple bodily ulcers  # Nasal MRSA +VE  # Urine positive for Enteric faecalis and Staphylococcus aureus   Patient has a long history of venous stasis ulcers and it is somewhat unclear if her current ulcers are acutely changed. She did have a leukocytosis to 14 and pro-calcitonin to 0.27 on presentation though leukocytosis rapidly resolved. Doppler without DVT. Appreciated review by antimicrobial stewardship team 9/25. With low suspicion for infection stopped antibiotics 9/25 and refrained from acquiring X-Ray foot. Given Staph in blood did order blood cultures 9/25 to look for possible blood stream infection.   - Discontinued Vanco and ceftriaxone 9/25   - Continued PTA Torsemide 15 mg every day   - WOC consult, Continued wound care   - Continued PTA methadone  - Ordered PTA Oxycodone 5 mg Q6H prn   - May require placement      Assessment:      Areas visualized during today's visit: Complete head to toe     Pressure Injury Location: Left Buttocks      Last photo: 9/28/23  Wound type: Pressure Injury, Friction, Shear, and Moisture  Associated Skin Damage (MASD)     Pressure Injury Stage: 2 AND Suspected Deep Tissue Injury, present on admission  Wound history/plan of care:  Pt found down in home for unknown period of time covered in stool  Wound base: see photo - left IT is about 20% red buds, 50% dark moist tissue, 30% yellow, non-fluctuant. Left buttock mixture of red open tissue (25%) and dried (75%)     Palpation of the wound bed: normal      Drainage: small     Description of drainage: serosanguinous     Measurements (length x width x depth, in cm) Left IT wound is [id number]. Left buttock 4.5x1cm.     Tunneling N/A     Undermining N/A  Periwound skin: Dry/scaly and Erythema- blanchable      Color: pink and red      Temperature: normal   Odor: none  Pain: during dressing change  Pain intervention prior to dressing change: oral Tylenol and Oxycodone  Treatment goal: Heal   STATUS: initial assessment  Supplies ordered: gathered, discussed with RN, and discussed with patient     Wound location: Right buttocks/posterior thigh    No new photos, no open areas  Last photo: 9/25/23  Wound due to: Friction, Shear, and Moisture Associated Skin Damage (MASD)  Wound history/plan of care:  Pt found down in home for unknown period of time covered in stool  Wound base: pink/purple coloring is all blanchable (measures 30cm in length) with crusty/scaly area on left posterior thigh       Palpation of the wound bed: normal      Drainage: scant     Description of drainage: bloody     Measurements (length x width x depth, in cm): 1cm x 3.5cm x flush     Tunneling: N/A     Undermining: N/A  Periwound skin: Dry/scaly      Color: pink and purple, blanchable      Temperature: normal   Odor: none  Pain: wounds not painful but positioning for wounds to be assessed was painful  Pain intervention prior to dressing change: oral Tylenol and Oxycodone  Treatment goal: Heal   STATUS: initial assessment  Supplies ordered: at bedside, discussed with RN, and discussed with  patient      Wound location: Right Lower Leg      New new medial lower leg photo, no open areas here (all dried/resolving here). No new measurements    Last photo: 9/28/23  Wound due to: Venous Ulcer  Wound history/plan of care: Pt has had venous wounds for quite a while and has followed with wound care providers through [facility]. Pt has been following with [facility] and states unna boots were in use but she wasn't tolerating them r/t pain. She has had treatment with Triad Hydrophilic Wound Dressing in the past also.  Wound base: clean red/pink in early granulation     Palpation of the wound bed: normal      Drainage: moderate     Description of drainage: serous, very light green, moderate     Measurements (length x width x depth, in cm):    Lateral - proximal wound measures 2.5cm x 3cm x flush and distal wound measured 5.3cm x 2cm x flush   Anterior - area on anterior leg with mix of scaly dry areas, dry red, and one open wound measures 17cm x 15cm; most proximal wound measures 2.5cm x 1.5cm and has a mix of clean red and yellow necrotic tissue  Medial - 8.5cm x 5cm with 3 wounds within; all are dry/with dry drainage     Tunneling: N/A     Undermining: N/A  Periwound skin: Dry/scaly      Color: pink      Temperature: normal   Odor: none  Pain: Legs are very painful for patient with left leg more painful than right  Pain intervention prior to dressing change: oral Tylenol and Oxycodone  Treatment goal: Heal , Drainage control, Infection control/prevention, Increase granulation, and Remove necrotic tissue  STATUS: initial assessment  Supplies ordered: gathered, supplies stored on unit, discussed with RN, and discussed with patient      Wound location: Left Lower Leg    Last photo: 9/28/23. No new measurements  Wound due to: Venous Ulcer  Wound history/plan of care: Pt has had venous wounds for quite a while and has followed with wound care providers through [facility]. Pt has been following with [facility] and  states unna boots were in use but she wasn't tolerating them r/t pain. She has had treatment with Triad Hydrophilic Wound Dressing in the past also.  Wound base: clean pink/red in early granulation     Palpation of the wound bed: normal      Drainage:  heavy     Description of drainage: serous to light green, moderate     Measurements (length x width x depth, in cm): Pt with small wound immediately distal to knee measuring 0.4cm x 0.4cm x flush (dry yellow tissue); large area of wounds/scaly moist area (anterior leg)  measures 18.5cm x 14cm x flush - 0.2cm; area of open wounds on lateral leg measures 15cm x 7.5cm x flush - 0.2cm     Tunneling: N/A     Undermining: N/A  Periwound skin: Dry/scaly      Color: pink      Temperature: normal   Odor: none  Pain: Legs are very painful for patient with left leg more painful than right  Pain intervention prior to dressing change: oral Tylenol and Oxycodone  Treatment goal: Heal , Drainage control, Infection control/prevention, Increase granulation, and Remove necrotic tissue  STATUS: initial assessment  Supplies ordered: gathered, supplies stored on unit, discussed with RN, and discussed with patient      Pressure Injury Location: Left Heel    New photos are lateral then plantar view    Last photo: 9/28/23  Wound type: Pressure Injury     Pressure Injury Stage: stage IV present on admissoin   Wound history/plan of care:   Pt states she had a prior wound in this location and underwent surgery 10/31/22 followed by a wound VAC. She states the wound subsequently healed. She wasn't aware that the wound had reopened.    Wound base: see photo - tissue is clean red with bone visible in the center     Palpation of the wound bed: firm      Drainage: small     Description of drainage: bloody, serosanguinous, heavy     Measurements (length x width x depth, in cm) 6.7cm x 6cm x estimated 2cm     Tunneling N/A     Undermining N/A  Periwound skin: maceration, few dark spots seen in photo       Color: normal and consistent with surrounding tissue      Temperature: normal   Odor: offensive on initial removal of dressings, seems to resolve with cares  Pain: Pt voiced pain with positioning of her leg to be able to see the heel wound; difficult to determine if the heel wound was also painful as pt reported numbness  Pain intervention prior to dressing change: oral Tylenol and Oxycodone  Treatment goal: Infection control/prevention; pt will need surgical debridement  STATUS: initial assessment  Supplies ordered: gathered, at bedside, discussed with RN, and discussed with patient     My PI Risk Assessment     Sensory Perception: 3 - Slightly Limited     Moisture: 3 - Occasionally moist      Activity: 1 - Bedfast      Mobility: 2 - Very limited     Nutrition: 2 - Probably inadequate      Friction/Shear: 1 - Problem     TOTAL: 12     Other areas:  No wound cares needed for the wounds documented below at this time    Left Hand - Suspected Deep Tissue Injury present on admission (blood-filled bullae)  Not re-asessed this visit    2.5cm x 1.7cm    Left Elbow - Stage 2 pressure injury (based on description, area was a suspected deep tissue injury on admission but has evolved to a Stage 2)  Not reassessed this visit    Measures 1cm x 0.9cm x flush with scant bleeding    Right Heel - Suspected Deep Tissue Injury present on admission  No change    Measures 2cm x 3.3cm - dry/stable    Left foot - Left 5th toe is Suspected Deep Tissue Pressure Injury present on admission; bunion has dried drainage  No change    0.9cm x 0.5cm (lateral 5th toe); 0.4cm x 0.8cm (left bunion) - both are dry/stable    Pedal and PT pulses palpable and  biphasic by doppler bilaterally.  Cap refill <3 sec  Pt with neuropathy BLE  Pitting edema 2-3+ bilateral feet; legs edematous but non-pitting (very painful when this writer attempted to assess for pitting on legs); with wrinkling on legs/feet can tell that edema is reduced from  baseline    Treatment Plan:     Buttock wound(s):  1.) Cleanse left IT and left buttock open areas and at least 4 inches of surrounding skin with microklenz, dry  2.) Apply triad paste over wound (use a separate tube for buttocks and legs, label tubes)  3.) Apply Sureprep to skin to skin around wound where tape edge will be and allow to dry  4.) Cover with foam bandages (3x3 -regular thickness night light/thin- over IT and 4x4 over left buttock)  Change every other day and as needed    Cleanse remainder of buttocks to thighs and sreekanth area with Marisol Cleanse and Protect cleansing lotion.   Apply Aquaphor to any flaky patches. (use a separate tube for buttocks and legs, label tubes) Repeat daily and PRN following incontinence cares    Bilateral leg wound(s): Daily  1.) Clean intact skin on legs and feet with Wander wipes.   2.) Clean wounds with Microklenz wound cleanser OR can do Vashe soak (Vashe on gauze) to wounds for 5-10 minutes. Then gently wipe wounds with gauze and pat dry. Do not scrub to remove Triad, remove soiled product and reapply.  3.) Apply Aquaphor to any dry, flaky areas. (use a separate tube for buttocks and legs, label tubes)  4.) To any open wounds with yellow tissue, apply Triad Hydrophilic Wound Dressing then mepilex transfer. (use a separate tube for buttocks and legs, label tubes)  5.) Cover area of heavy drainage with ABD pads and secure with rolled gauze.    Repeat Step 5 as needed throughout the day for strikethrough drainage.    Left heel wound: Daily  1.) Irrigate wound with Microklenz wound cleanser on stream setting and rub to remove any loose debris.   2.) cleanse at least 4 inches of periwound skin with microklenz and dry  3.) Apply Vashe moistened kerlix fluff to wound bed  4.) Apply criticaide barrier cream to periwound skin (ensure you use the one labeled heel to prevent any cross contamination)  5.) Cover with dry kerlix gauze and ABD pad and secured with rolled gauze    Elevate  "bilateral legs on pillows so that heels are floating.    Pressure Injury Prevention (PIP) Plan:  If patient is declining pressure injury prevention interventions: Explore reason why and address patient's concerns, Educate on pressure injury risk and prevention intervention(s), If patient is still declining, document \"informed refusal\" , and Ensure Care team is aware ( provider, charge nurse, etc)  Mattress: Follow bed algorithm, reassess daily and order specialty mattress, if indicated.  HOB:  Ideally less than 30 degrees but pt isn't tolerating so have HOB as low as pt can tolerated  Repositioning in bed: Every 1-2 hours , Left/right positioning; avoid supine, and Raise foot of bed prior to raising head of bed, to reduce patient sliding down (shear)  Heels: Keep elevated off mattress and Pillows under calves  Protective Dressing: None  Positioning Equipment: Seated Positioning System (SPS)  (#[id number]) Sling must have contact with chair, no pillow or chair cushion beneath  Chair positioning: Do NOT use a donut for sitting (this increases pressure to smaller area and creates a higher potential for injury)  and Seated Positioning System    If patient has a buttock pressure injury, or high risk for PI use chair cushion or SPS.  Moisture Management: Avoid brief in bed, Consider InterDry (#[id number]) between folds, and Moisturize dry skin  Under Devices: Inspect skin under all medical devices during skin inspection , Ensure tubes are stabilized without tension, and Ensure patient is not lying on medical devices or equipment when repositioned  Ask provider to discontinue device when no longer needed.     Orders: Written    RECOMMEND PRIMARY TEAM ORDER: Podiatry consult  Education provided: importance of repositioning, plan of care, wound progress, and Off-loading pressure  Discussed plan of care with: Patient and Nurse  WOC nurse follow-up plan: weekly  Notify WOC if wound(s) deteriorate.  Nursing to notify the Provider(s) " and re-consult the [facility] Nurse if new skin concern.    DATA:     Current support surface: Standard  Low air loss (BRISA pump, Isolibrium, Pulsate, skin guard, etc)  Containment of urine/stool: Continent of bladder, Continent of bowel, Incontinent pad in bed, and Purewick external catheter   BMI: Body mass index is 39.13 kg/m .   Active diet order: Orders Placed This Encounter      Regular Diet Adult     Output: I/O last 3 completed shifts:  In: 2722 [P.O.:2278; I.V.:444]  Out: 3500 [Urine:3500]     Labs:   Recent Labs   Lab 09/28/23  0558 09/24/23  1216 09/23/23  0533 09/22/23  1941   ALBUMIN  --   --   --  3.1*   HGB 9.9*   < > 10.7* 11.4*   INR 1.34*   < > 4.97* 4.08*   WBC 8.5   < > 11.0 14.1*   A1C  --   --  7.0*  --     < > = values in this interval not displayed.     Pressure injury risk assessment:   Sensory Perception: 3-->slightly limited  Moisture: 3-->occasionally moist  Activity: 2-->chairfast  Mobility: 2-->very limited  Nutrition: 3-->adequate  Friction and Shear: 1-->problem  Suresh Score: 14    Anastasia Gray RN, CWOCN  Vocera group: [facility] Nurse  Dept. Office Number: [phone]

## 2023-09-28 NOTE — PROGRESS NOTES
[facility]    Medicine Progress Note - Hospitalist Service    Date of Admission:  9/22/2023    Assessment & Plan      Linda Loredo is a 78 year old lady w/ hx of HTN, CKD III, HLD, Vit D def, obesity, pAfib on coumadin, DM2, venous stasis brought in by police after welfare check found to have osteomyelitis of left heal debrided in OR 9/26. Gram stain growing multiple organisms including Pseudomonas and Enterococcus. Hemodynamically stable since admission pending antibiotic plan. .     # Decubitus ulcer of left heal   # Left heel osteomyelitis   # Left foot cellulitis, improving   # Chronic venous statis of lower extremities   # Multiple bodily ulcers  # Nasal MRSA +VE  # Urine positive for Enteric faecalis, Staphylococcus aureus, and E. coli   Patient has a long history of venous stasis ulcers initially unclear if her current ulcers are acutely changed however she was found to have an infected decubitus ulcer of her left heel with osteomyelitis. Initial leukocytosis to 14 and pro-calcitonin to 0.27 on presentation though leukocytosis rapidly resolved.. Cultures growing multiple organisms from debridement tissue including Pseudomonas aeruginosa, Morganella Livan I, Enterococcus faecalis, and Proteus mirabilis.  Sensitivities pending.  Discussing with infectious disease  - Continued vancomycin pending Enterococcus sensitivities  - Switch ceftriaxone to cefepime with Pseudomonas growing from cultures  - Podiatry following appreciate recommendations  - PICC line placed 9/27  - Blood cultures from 9/25 without growth to date, final results pending   - Plan to discuss antibiotic plan with infectious disease pending culture data  - Formal ID recommendations pending  - Continued PTA Torsemide 15 mg every day   - Continued wound care   - Continued PTA methadone  - Continued PTA Oxycodone 5 mg Q6H prn   - May require placement however patient resistant to this idea     # DM2  A1c 7.0  "9/23/23. On deguldec 35 units HS PTA. Has had minimal sliding scale requirement.   - ISS for now / accuchecks / ADA diet  - Holding PTA Metformin   - Holding PTA Ozempic   - Holding PTA Degludec     # CKD III  # Hypercalcemia   - Daily bmp     # pAfib  # Supratherapeutic INR, resolved   Ventricular rate controlled  -Continued warfarin post procedure    # Hx HTN  - Continued PTA lisinopril 5 mg     # Dyslipidemia   - Continued PTA simvastatin 20 mg every day     #Constipation  -Senna  -Patient in agreement with starting MiraLAX  if needed       Diet: Snacks/Supplements Adult: Glucerna; With Meals  Regular Diet Adult    DVT Prophylaxis: Warfarin  Braga Catheter: Not present  Lines: PRESENT      PICC 09/27/23 Single Lumen Right Basilic antibiotics-Site Assessment: WDL      Cardiac Monitoring: None  Code Status: Full Code      Clinically Significant Risk Factors              # Hypoalbuminemia: Lowest albumin = 3.1 g/dL at 9/22/2023  7:41 PM, will monitor as appropriate       # Hypertension: Noted on problem list       # DMII: A1C = 7.0 % (Ref range: <5.7 %) within past 6 months       # Obesity: Estimated body mass index is 39.13 kg/m  as calculated from the following:    Height as of this encounter: 1.753 m (5' 9\").    Weight as of this encounter: 120.2 kg (265 lb).             Disposition Plan      Expected Discharge Date: 09/30/2023      Destination: inpatient rehabilitation facility  Discharge Comments: Will need final sensitivities from cultures for antibiotic plan before patient will be able to discharge safely.          Zacarias Holliday MD  Hospitalist Service  [employer]  Securely message with [employer] (more info)  Text page via [facility] Paging/Directory   ______________________________________________________________________    Interval History   Reports feeling well again this morning, denies fevers, chills, or night sweats.  States that her leg pain is better controlled today.  Voiced " some frustration about the PICC line needing to be repositioned yesterday.  Hopeful that she will be able to leave the hospital soon but seeming slightly more open to some sort of placement.    Physical Exam   Vital Signs: Temp: 98.1  F (36.7  C) Temp src: Oral BP: 128/45 Pulse: 79   Resp: 16 SpO2: 98 % O2 Device: None (Room air)    Weight: 265 lbs 0 oz    General Appearance: Awake and alert sitting up in bed in no acute distress   Respiratory: Breathing easily on room air   Cardiovascular: Irregular rhythm and regular rate extremities warm and well perfused.   GI: Abdomen soft non-tender and non-distended   Skin: LE wrapped with some erythema and edema on shins, left foot wrapped , tenderness to palpation of bilateral lower extremities improved from yesterday       Medical Decision Making       45 MINUTES SPENT BY ME on the date of service doing chart review, history, exam, documentation & further activities per the note.      Data     I have personally reviewed the following data over the past 24 hrs:    8.5  \   9.9 (L)   / 178     137 97 (L) 29.6 (H) /  191 (H)   4.0 31 (H) 1.11 (H) \     INR:  1.34 (H) PTT:  N/A   D-dimer:  N/A Fibrinogen:  N/A

## 2023-09-28 NOTE — PROCEDURES
[facility]  Procedure Note         PICC      Linda Loredo  MRN# [id number]   September 27, 2023, 8:03 PM Indication: Medication administration           Pause for the cause: Consent for catheter placement procedure signed  Time out completed  Patient ID's verified using two distinct indicators  All necessary equipment is present  Site marked if extremity to be used has been predetermined   Type of line to be used: PICC   Full barrier precautions used: Yes   Skin preparation: Chloraprep   Date of insertion: September 27, 2023, 8:03 PM   Device type: Single lumen, valved, 4.0   Catheter brand: [employer]   Lot number: [id number]   Insertion location: Right basilic vein   Method of placement: Ultrasound   Number of attempts: With ultrasound: 1   Without ultrasound: 1   Difficulty threading: No   Midline IV device: na   Arm circumference: Adults 15 cm   Midline extremity circumference: 33 cm   Internal length: 46 cm   Midline visible catheter length: 0 cm   Total catheter length: 46 cm   Tip termination: Low svc   Method of verification: Chest x-ray   Midline patency post placement: na   Line flush: Line flush documented on the eMAR 20ml   Placement verified by: Registered Nurse   Catheter placed by: Angela Macdonald RN   Discontinuation form initiated: No   Patient tolerance: Tolerated well   PICC Insertion Education Complete: Yes      Summary:  Time out completed with primary RN Radha. Initial xray showed loop in jugular and subclavian vein. Attempted power flush x3 with 20 ml of NS with no success. Conducted over the wire exchange at this time under sterile conditions. Tip of picc terminates at low svc. This procedure was performed without difficulty and she tolerated the procedure well with no immediate complications. Site and dressing CDI. Education left with staff. OK to use      Recorded by Angela Macdonald RN    Attestation:  Angela Macdonald RN     Procedures

## 2023-09-28 NOTE — PROGRESS NOTES
Patient is alert and oriented.  On contact precautions.  Single lumen PICC right upper arm. Plan is for 6 weeks antibiotic.  WOC to see 9/28 for multiple wound issues. Heels elevated off bed with pillows. Reposition every two hours.  Carb count and I&O's.  Chronic pain in legs, hand and back; consistently 6/10. Patient is on scheduled methadone.  Patient is incontinent, requests a Purewick when needing to void.

## 2023-09-29 ENCOUNTER — HOSPITAL ENCOUNTER (INPATIENT)
Facility: CLINIC | Age: 78
LOS: 25 days | Discharge: NURSING, REHAB OR LONG-TERM CARE | DRG: 617 | End: 2023-10-24
Attending: HOSPITALIST | Admitting: INTERNAL MEDICINE
Payer: COMMERCIAL

## 2023-09-29 ENCOUNTER — APPOINTMENT (OUTPATIENT)
Dept: OCCUPATIONAL THERAPY | Facility: CLINIC | Age: 78
DRG: 629 | End: 2023-09-29
Payer: COMMERCIAL

## 2023-09-29 ENCOUNTER — APPOINTMENT (OUTPATIENT)
Dept: PHYSICAL THERAPY | Facility: CLINIC | Age: 78
DRG: 629 | End: 2023-09-29
Payer: COMMERCIAL

## 2023-09-29 VITALS
OXYGEN SATURATION: 98 % | RESPIRATION RATE: 16 BRPM | SYSTOLIC BLOOD PRESSURE: 104 MMHG | TEMPERATURE: 98.2 F | DIASTOLIC BLOOD PRESSURE: 55 MMHG | BODY MASS INDEX: 39.25 KG/M2 | HEART RATE: 76 BPM | WEIGHT: 265 LBS | HEIGHT: 69 IN

## 2023-09-29 DIAGNOSIS — L97.929 VENOUS STASIS ULCERS OF BOTH LOWER EXTREMITIES (H): ICD-10-CM

## 2023-09-29 DIAGNOSIS — I83.029 VENOUS STASIS ULCERS OF BOTH LOWER EXTREMITIES (H): ICD-10-CM

## 2023-09-29 DIAGNOSIS — L97.919 VENOUS ULCER OF RIGHT LEG (H): ICD-10-CM

## 2023-09-29 DIAGNOSIS — I83.019 VENOUS STASIS ULCERS OF BOTH LOWER EXTREMITIES (H): ICD-10-CM

## 2023-09-29 DIAGNOSIS — Z89.512 HX OF BKA, LEFT (H): ICD-10-CM

## 2023-09-29 DIAGNOSIS — L03.90 CELLULITIS, UNSPECIFIED CELLULITIS SITE: Primary | ICD-10-CM

## 2023-09-29 DIAGNOSIS — L25.8 DERMATITIS ASSOCIATED WITH MOISTURE FROM STOOL INCONTINENCE: ICD-10-CM

## 2023-09-29 DIAGNOSIS — L03.213 PERIORBITAL CELLULITIS, UNSPECIFIED LATERALITY: ICD-10-CM

## 2023-09-29 DIAGNOSIS — L97.919 VENOUS STASIS ULCERS OF BOTH LOWER EXTREMITIES (H): ICD-10-CM

## 2023-09-29 DIAGNOSIS — L97.929 VENOUS ULCER OF LEFT LEG (H): ICD-10-CM

## 2023-09-29 DIAGNOSIS — F11.20 OPIOID DEPENDENCE, UNCOMPLICATED (H): ICD-10-CM

## 2023-09-29 DIAGNOSIS — I83.029 VENOUS ULCER OF LEFT LEG (H): ICD-10-CM

## 2023-09-29 DIAGNOSIS — L97.425 DIABETIC ULCER OF LEFT HEEL ASSOCIATED WITH TYPE 2 DIABETES MELLITUS, WITH MUSCLE INVOLVEMENT WITHOUT EVIDENCE OF NECROSIS (H): ICD-10-CM

## 2023-09-29 DIAGNOSIS — R15.9 DERMATITIS ASSOCIATED WITH MOISTURE FROM STOOL INCONTINENCE: ICD-10-CM

## 2023-09-29 DIAGNOSIS — E11.621 DIABETIC ULCER OF LEFT HEEL ASSOCIATED WITH TYPE 2 DIABETES MELLITUS, WITH MUSCLE INVOLVEMENT WITHOUT EVIDENCE OF NECROSIS (H): ICD-10-CM

## 2023-09-29 DIAGNOSIS — Z46.89 ENCOUNTER FOR MANAGEMENT OF WOUND VAC: ICD-10-CM

## 2023-09-29 DIAGNOSIS — Z79.4 TYPE 2 DIABETES MELLITUS WITH COMPLICATION, WITH LONG-TERM CURRENT USE OF INSULIN (H): ICD-10-CM

## 2023-09-29 DIAGNOSIS — E11.8 TYPE 2 DIABETES MELLITUS WITH COMPLICATION, WITH LONG-TERM CURRENT USE OF INSULIN (H): ICD-10-CM

## 2023-09-29 DIAGNOSIS — L97.409 DIABETIC ULCER OF HEEL (H): ICD-10-CM

## 2023-09-29 DIAGNOSIS — I10 ESSENTIAL HYPERTENSION WITH GOAL BLOOD PRESSURE LESS THAN 140/90: ICD-10-CM

## 2023-09-29 DIAGNOSIS — E11.621 DIABETIC ULCER OF HEEL (H): ICD-10-CM

## 2023-09-29 DIAGNOSIS — I83.019 VENOUS ULCER OF RIGHT LEG (H): ICD-10-CM

## 2023-09-29 LAB
ANION GAP SERPL CALCULATED.3IONS-SCNC: 8 MMOL/L (ref 7–15)
BUN SERPL-MCNC: 29.9 MG/DL (ref 8–23)
CALCIUM SERPL-MCNC: 8.9 MG/DL (ref 8.8–10.2)
CHLORIDE SERPL-SCNC: 98 MMOL/L (ref 98–107)
CREAT SERPL-MCNC: 1.18 MG/DL (ref 0.51–0.95)
DEPRECATED HCO3 PLAS-SCNC: 30 MMOL/L (ref 22–29)
EGFRCR SERPLBLD CKD-EPI 2021: 47 ML/MIN/1.73M2
ERYTHROCYTE [DISTWIDTH] IN BLOOD BY AUTOMATED COUNT: 12.7 % (ref 10–15)
GLUCOSE BLDC GLUCOMTR-MCNC: 177 MG/DL (ref 70–99)
GLUCOSE BLDC GLUCOMTR-MCNC: 180 MG/DL (ref 70–99)
GLUCOSE BLDC GLUCOMTR-MCNC: 189 MG/DL (ref 70–99)
GLUCOSE BLDC GLUCOMTR-MCNC: 229 MG/DL (ref 70–99)
GLUCOSE BLDC GLUCOMTR-MCNC: 282 MG/DL (ref 70–99)
GLUCOSE SERPL-MCNC: 193 MG/DL (ref 70–99)
HCT VFR BLD AUTO: 28.1 % (ref 35–47)
HGB BLD-MCNC: 9.3 G/DL (ref 11.7–15.7)
INR PPP: 1.7 (ref 0.85–1.15)
MCH RBC QN AUTO: 30.7 PG (ref 26.5–33)
MCHC RBC AUTO-ENTMCNC: 33.1 G/DL (ref 31.5–36.5)
MCV RBC AUTO: 93 FL (ref 78–100)
PLATELET # BLD AUTO: 156 10E3/UL (ref 150–450)
POTASSIUM SERPL-SCNC: 3.7 MMOL/L (ref 3.4–5.3)
RBC # BLD AUTO: 3.03 10E6/UL (ref 3.8–5.2)
SODIUM SERPL-SCNC: 136 MMOL/L (ref 135–145)
WBC # BLD AUTO: 8.7 10E3/UL (ref 4–11)

## 2023-09-29 PROCEDURE — 85610 PROTHROMBIN TIME: CPT | Performed by: PODIATRIST

## 2023-09-29 PROCEDURE — 99223 1ST HOSP IP/OBS HIGH 75: CPT | Mod: FS | Performed by: INTERNAL MEDICINE

## 2023-09-29 PROCEDURE — 250N000011 HC RX IP 250 OP 636: Mod: JZ | Performed by: STUDENT IN AN ORGANIZED HEALTH CARE EDUCATION/TRAINING PROGRAM

## 2023-09-29 PROCEDURE — 97530 THERAPEUTIC ACTIVITIES: CPT | Mod: GP

## 2023-09-29 PROCEDURE — 250N000013 HC RX MED GY IP 250 OP 250 PS 637: Performed by: PODIATRIST

## 2023-09-29 PROCEDURE — 250N000013 HC RX MED GY IP 250 OP 250 PS 637: Performed by: STUDENT IN AN ORGANIZED HEALTH CARE EDUCATION/TRAINING PROGRAM

## 2023-09-29 PROCEDURE — 258N000003 HC RX IP 258 OP 636: Performed by: STUDENT IN AN ORGANIZED HEALTH CARE EDUCATION/TRAINING PROGRAM

## 2023-09-29 PROCEDURE — 80048 BASIC METABOLIC PNL TOTAL CA: CPT | Performed by: STUDENT IN AN ORGANIZED HEALTH CARE EDUCATION/TRAINING PROGRAM

## 2023-09-29 PROCEDURE — 99418 PROLNG IP/OBS E/M EA 15 MIN: CPT | Performed by: INTERNAL MEDICINE

## 2023-09-29 PROCEDURE — 120N000001 HC R&B MED SURG/OB

## 2023-09-29 PROCEDURE — L3260 AMBULATORY SURGICAL BOOT EAC: HCPCS

## 2023-09-29 PROCEDURE — 85027 COMPLETE CBC AUTOMATED: CPT | Performed by: STUDENT IN AN ORGANIZED HEALTH CARE EDUCATION/TRAINING PROGRAM

## 2023-09-29 PROCEDURE — 97530 THERAPEUTIC ACTIVITIES: CPT | Mod: GO | Performed by: OCCUPATIONAL THERAPIST

## 2023-09-29 PROCEDURE — 99207 PR APP CREDIT; MD BILLING SHARED VISIT: CPT | Performed by: STUDENT IN AN ORGANIZED HEALTH CARE EDUCATION/TRAINING PROGRAM

## 2023-09-29 PROCEDURE — 82962 GLUCOSE BLOOD TEST: CPT

## 2023-09-29 RX ORDER — ONDANSETRON 2 MG/ML
4 INJECTION INTRAMUSCULAR; INTRAVENOUS EVERY 6 HOURS PRN
Status: CANCELLED | OUTPATIENT
Start: 2023-09-29

## 2023-09-29 RX ORDER — LISINOPRIL 5 MG/1
5 TABLET ORAL DAILY
Status: CANCELLED | OUTPATIENT
Start: 2023-09-30

## 2023-09-29 RX ORDER — NALOXONE HYDROCHLORIDE 0.4 MG/ML
0.2 INJECTION, SOLUTION INTRAMUSCULAR; INTRAVENOUS; SUBCUTANEOUS
Status: CANCELLED | OUTPATIENT
Start: 2023-09-29

## 2023-09-29 RX ORDER — MINERAL OIL/HYDROPHIL PETROLAT
OINTMENT (GRAM) TOPICAL
Status: DISCONTINUED | OUTPATIENT
Start: 2023-09-29 | End: 2023-09-29

## 2023-09-29 RX ORDER — ONDANSETRON 2 MG/ML
4 INJECTION INTRAMUSCULAR; INTRAVENOUS EVERY 6 HOURS PRN
Status: DISCONTINUED | OUTPATIENT
Start: 2023-09-29 | End: 2023-10-24 | Stop reason: HOSPADM

## 2023-09-29 RX ORDER — POLYETHYLENE GLYCOL 3350 17 G/17G
17 POWDER, FOR SOLUTION ORAL DAILY PRN
Status: DISCONTINUED | OUTPATIENT
Start: 2023-09-29 | End: 2023-10-24 | Stop reason: HOSPADM

## 2023-09-29 RX ORDER — SENNOSIDES 8.6 MG
8.6 TABLET ORAL 2 TIMES DAILY PRN
Status: DISCONTINUED | OUTPATIENT
Start: 2023-09-29 | End: 2023-09-29

## 2023-09-29 RX ORDER — ONDANSETRON 4 MG/1
4 TABLET, ORALLY DISINTEGRATING ORAL EVERY 6 HOURS PRN
Status: DISCONTINUED | OUTPATIENT
Start: 2023-09-29 | End: 2023-09-29

## 2023-09-29 RX ORDER — NALOXONE HYDROCHLORIDE 0.4 MG/ML
0.4 INJECTION, SOLUTION INTRAMUSCULAR; INTRAVENOUS; SUBCUTANEOUS
Status: CANCELLED | OUTPATIENT
Start: 2023-09-29

## 2023-09-29 RX ORDER — BISACODYL 10 MG
10 SUPPOSITORY, RECTAL RECTAL DAILY PRN
Status: DISCONTINUED | OUTPATIENT
Start: 2023-09-29 | End: 2023-09-29

## 2023-09-29 RX ORDER — DEXTROSE MONOHYDRATE 25 G/50ML
25-50 INJECTION, SOLUTION INTRAVENOUS
Status: CANCELLED | OUTPATIENT
Start: 2023-09-29

## 2023-09-29 RX ORDER — WARFARIN SODIUM 5 MG/1
5 TABLET ORAL
Status: COMPLETED | OUTPATIENT
Start: 2023-09-29 | End: 2023-09-29

## 2023-09-29 RX ORDER — BISACODYL 10 MG
10 SUPPOSITORY, RECTAL RECTAL DAILY PRN
Status: CANCELLED | OUTPATIENT
Start: 2023-09-29

## 2023-09-29 RX ORDER — NALOXONE HYDROCHLORIDE 0.4 MG/ML
0.2 INJECTION, SOLUTION INTRAMUSCULAR; INTRAVENOUS; SUBCUTANEOUS
Status: DISCONTINUED | OUTPATIENT
Start: 2023-09-29 | End: 2023-10-24 | Stop reason: HOSPADM

## 2023-09-29 RX ORDER — NALOXONE HYDROCHLORIDE 0.4 MG/ML
0.2 INJECTION, SOLUTION INTRAMUSCULAR; INTRAVENOUS; SUBCUTANEOUS
Status: DISCONTINUED | OUTPATIENT
Start: 2023-09-29 | End: 2023-09-29

## 2023-09-29 RX ORDER — ACETAMINOPHEN 650 MG/1
650 SUPPOSITORY RECTAL EVERY 6 HOURS PRN
Status: DISCONTINUED | OUTPATIENT
Start: 2023-09-29 | End: 2023-10-07

## 2023-09-29 RX ORDER — ACETAMINOPHEN 325 MG/1
650 TABLET ORAL EVERY 6 HOURS PRN
Status: DISCONTINUED | OUTPATIENT
Start: 2023-09-29 | End: 2023-09-30

## 2023-09-29 RX ORDER — ONDANSETRON 2 MG/ML
4 INJECTION INTRAMUSCULAR; INTRAVENOUS EVERY 6 HOURS PRN
Status: DISCONTINUED | OUTPATIENT
Start: 2023-09-29 | End: 2023-09-29

## 2023-09-29 RX ORDER — SENNOSIDES 8.6 MG
8.6 TABLET ORAL 2 TIMES DAILY PRN
Status: CANCELLED | OUTPATIENT
Start: 2023-09-29

## 2023-09-29 RX ORDER — TORSEMIDE 5 MG/1
15 TABLET ORAL 2 TIMES DAILY
Status: CANCELLED | OUTPATIENT
Start: 2023-09-29

## 2023-09-29 RX ORDER — ASCORBIC ACID 500 MG
500 TABLET ORAL DAILY
Status: DISCONTINUED | OUTPATIENT
Start: 2023-09-30 | End: 2023-09-29

## 2023-09-29 RX ORDER — HYDROMORPHONE HYDROCHLORIDE 1 MG/ML
0.5 INJECTION, SOLUTION INTRAMUSCULAR; INTRAVENOUS; SUBCUTANEOUS
Status: DISCONTINUED | OUTPATIENT
Start: 2023-09-29 | End: 2023-10-05

## 2023-09-29 RX ORDER — OXYCODONE HYDROCHLORIDE 5 MG/1
5 TABLET ORAL EVERY 6 HOURS PRN
Status: DISCONTINUED | OUTPATIENT
Start: 2023-09-29 | End: 2023-09-29

## 2023-09-29 RX ORDER — LIDOCAINE 40 MG/G
CREAM TOPICAL
Status: DISCONTINUED | OUTPATIENT
Start: 2023-09-29 | End: 2023-09-29

## 2023-09-29 RX ORDER — NALOXONE HYDROCHLORIDE 0.4 MG/ML
0.4 INJECTION, SOLUTION INTRAMUSCULAR; INTRAVENOUS; SUBCUTANEOUS
Status: DISCONTINUED | OUTPATIENT
Start: 2023-09-29 | End: 2023-10-24 | Stop reason: HOSPADM

## 2023-09-29 RX ORDER — ONDANSETRON 4 MG/1
4 TABLET, ORALLY DISINTEGRATING ORAL EVERY 6 HOURS PRN
Status: DISCONTINUED | OUTPATIENT
Start: 2023-09-29 | End: 2023-10-24 | Stop reason: HOSPADM

## 2023-09-29 RX ORDER — NALOXONE HYDROCHLORIDE 0.4 MG/ML
0.4 INJECTION, SOLUTION INTRAMUSCULAR; INTRAVENOUS; SUBCUTANEOUS
Status: DISCONTINUED | OUTPATIENT
Start: 2023-09-29 | End: 2023-09-29

## 2023-09-29 RX ORDER — METHADONE HYDROCHLORIDE 5 MG/1
5 TABLET ORAL 3 TIMES DAILY
Status: CANCELLED | OUTPATIENT
Start: 2023-09-29

## 2023-09-29 RX ORDER — SIMVASTATIN 20 MG
20 TABLET ORAL AT BEDTIME
Status: DISCONTINUED | OUTPATIENT
Start: 2023-09-29 | End: 2023-09-29

## 2023-09-29 RX ORDER — DEXTROSE MONOHYDRATE 25 G/50ML
25-50 INJECTION, SOLUTION INTRAVENOUS
Status: DISCONTINUED | OUTPATIENT
Start: 2023-09-29 | End: 2023-10-24 | Stop reason: HOSPADM

## 2023-09-29 RX ORDER — METHADONE HYDROCHLORIDE 5 MG/1
5 TABLET ORAL 3 TIMES DAILY
Status: DISCONTINUED | OUTPATIENT
Start: 2023-09-30 | End: 2023-10-24 | Stop reason: HOSPADM

## 2023-09-29 RX ORDER — ASCORBIC ACID 500 MG
500 TABLET ORAL DAILY
Status: CANCELLED | OUTPATIENT
Start: 2023-09-30

## 2023-09-29 RX ORDER — NICOTINE POLACRILEX 4 MG
15-30 LOZENGE BUCCAL
Status: DISCONTINUED | OUTPATIENT
Start: 2023-09-29 | End: 2023-09-29

## 2023-09-29 RX ORDER — OXYCODONE HYDROCHLORIDE 5 MG/1
5 TABLET ORAL EVERY 6 HOURS PRN
Status: CANCELLED | OUTPATIENT
Start: 2023-09-29

## 2023-09-29 RX ORDER — LISINOPRIL 5 MG/1
5 TABLET ORAL DAILY
Status: DISCONTINUED | OUTPATIENT
Start: 2023-09-30 | End: 2023-09-29

## 2023-09-29 RX ORDER — DEXTROSE MONOHYDRATE 25 G/50ML
25-50 INJECTION, SOLUTION INTRAVENOUS
Status: DISCONTINUED | OUTPATIENT
Start: 2023-09-29 | End: 2023-09-29

## 2023-09-29 RX ORDER — SIMVASTATIN 20 MG
20 TABLET ORAL AT BEDTIME
Status: CANCELLED | OUTPATIENT
Start: 2023-09-29

## 2023-09-29 RX ORDER — NICOTINE POLACRILEX 4 MG
15-30 LOZENGE BUCCAL
Status: DISCONTINUED | OUTPATIENT
Start: 2023-09-29 | End: 2023-10-24 | Stop reason: HOSPADM

## 2023-09-29 RX ORDER — METHADONE HYDROCHLORIDE 5 MG/1
10 TABLET ORAL DAILY
Status: CANCELLED | OUTPATIENT
Start: 2023-09-30

## 2023-09-29 RX ORDER — CEFAZOLIN SODIUM 1 G/50ML
1250 SOLUTION INTRAVENOUS
Status: CANCELLED | OUTPATIENT
Start: 2023-09-29

## 2023-09-29 RX ORDER — ACETAMINOPHEN 650 MG/1
650 SUPPOSITORY RECTAL EVERY 6 HOURS PRN
Status: DISCONTINUED | OUTPATIENT
Start: 2023-09-29 | End: 2023-09-30

## 2023-09-29 RX ORDER — ONDANSETRON 4 MG/1
4 TABLET, ORALLY DISINTEGRATING ORAL EVERY 6 HOURS PRN
Status: CANCELLED | OUTPATIENT
Start: 2023-09-29

## 2023-09-29 RX ORDER — AMOXICILLIN 250 MG
1 CAPSULE ORAL 2 TIMES DAILY PRN
Status: DISCONTINUED | OUTPATIENT
Start: 2023-09-29 | End: 2023-10-24 | Stop reason: HOSPADM

## 2023-09-29 RX ORDER — ACETAMINOPHEN 325 MG/1
650 TABLET ORAL EVERY 6 HOURS PRN
Status: DISCONTINUED | OUTPATIENT
Start: 2023-09-29 | End: 2023-10-07

## 2023-09-29 RX ORDER — MINERAL OIL/HYDROPHIL PETROLAT
OINTMENT (GRAM) TOPICAL
Status: CANCELLED | OUTPATIENT
Start: 2023-09-29

## 2023-09-29 RX ORDER — NICOTINE POLACRILEX 4 MG
15-30 LOZENGE BUCCAL
Status: CANCELLED | OUTPATIENT
Start: 2023-09-29

## 2023-09-29 RX ORDER — ACETAMINOPHEN 325 MG/1
650 TABLET ORAL EVERY 6 HOURS PRN
Status: CANCELLED | OUTPATIENT
Start: 2023-09-29

## 2023-09-29 RX ORDER — METHADONE HYDROCHLORIDE 10 MG/1
10 TABLET ORAL DAILY
Status: DISCONTINUED | OUTPATIENT
Start: 2023-09-30 | End: 2023-10-24 | Stop reason: HOSPADM

## 2023-09-29 RX ORDER — LIDOCAINE 40 MG/G
CREAM TOPICAL
Status: CANCELLED | OUTPATIENT
Start: 2023-09-29 | End: 2023-09-30

## 2023-09-29 RX ORDER — AMOXICILLIN 250 MG
2 CAPSULE ORAL 2 TIMES DAILY PRN
Status: DISCONTINUED | OUTPATIENT
Start: 2023-09-29 | End: 2023-10-24 | Stop reason: HOSPADM

## 2023-09-29 RX ORDER — WARFARIN SODIUM 5 MG/1
5 TABLET ORAL
Status: CANCELLED | OUTPATIENT
Start: 2023-09-29

## 2023-09-29 RX ADMIN — METHADONE HYDROCHLORIDE 5 MG: 5 TABLET ORAL at 05:35

## 2023-09-29 RX ADMIN — IMIPENEM AND CILASTATIN SODIUM 750 MG: 250; 250 INJECTION, POWDER, FOR SOLUTION INTRAVENOUS at 17:05

## 2023-09-29 RX ADMIN — INSULIN ASPART 1 UNITS: 100 INJECTION, SOLUTION INTRAVENOUS; SUBCUTANEOUS at 08:53

## 2023-09-29 RX ADMIN — INSULIN ASPART 2 UNITS: 100 INJECTION, SOLUTION INTRAVENOUS; SUBCUTANEOUS at 11:21

## 2023-09-29 RX ADMIN — LISINOPRIL 5 MG: 5 TABLET ORAL at 08:50

## 2023-09-29 RX ADMIN — WARFARIN SODIUM 5 MG: 5 TABLET ORAL at 17:06

## 2023-09-29 RX ADMIN — METRONIDAZOLE 500 MG: 500 TABLET ORAL at 08:50

## 2023-09-29 RX ADMIN — METRONIDAZOLE 500 MG: 500 TABLET ORAL at 14:49

## 2023-09-29 RX ADMIN — TORSEMIDE 15 MG: 5 TABLET ORAL at 08:50

## 2023-09-29 RX ADMIN — INSULIN ASPART 1 UNITS: 100 INJECTION, SOLUTION INTRAVENOUS; SUBCUTANEOUS at 17:06

## 2023-09-29 RX ADMIN — METHADONE HYDROCHLORIDE 5 MG: 5 TABLET ORAL at 19:54

## 2023-09-29 RX ADMIN — OXYCODONE HYDROCHLORIDE AND ACETAMINOPHEN 500 MG: 500 TABLET ORAL at 08:50

## 2023-09-29 RX ADMIN — METHADONE HYDROCHLORIDE 5 MG: 5 TABLET ORAL at 11:17

## 2023-09-29 RX ADMIN — METHADONE HYDROCHLORIDE 10 MG: 5 TABLET ORAL at 14:49

## 2023-09-29 RX ADMIN — TORSEMIDE 15 MG: 5 TABLET ORAL at 20:07

## 2023-09-29 RX ADMIN — VANCOMYCIN HYDROCHLORIDE 1250 MG: 5 INJECTION, POWDER, LYOPHILIZED, FOR SOLUTION INTRAVENOUS at 02:32

## 2023-09-29 RX ADMIN — VANCOMYCIN HYDROCHLORIDE 1250 MG: 5 INJECTION, POWDER, LYOPHILIZED, FOR SOLUTION INTRAVENOUS at 19:54

## 2023-09-29 RX ADMIN — CEFEPIME 2 G: 2 INJECTION, POWDER, FOR SOLUTION INTRAVENOUS at 10:06

## 2023-09-29 RX ADMIN — SENNOSIDES 8.6 MG: 8.6 TABLET, FILM COATED ORAL at 11:17

## 2023-09-29 ASSESSMENT — ACTIVITIES OF DAILY LIVING (ADL)
ADLS_ACUITY_SCORE: 32
ADLS_ACUITY_SCORE: 28
ADLS_ACUITY_SCORE: 32
ADLS_ACUITY_SCORE: 28
ADLS_ACUITY_SCORE: 32
ADLS_ACUITY_SCORE: 35

## 2023-09-29 NOTE — PLAN OF CARE
"Assumed cares [id number], A&O x 4, purewick was in place for a few hours, patient incontinent, frequently turned, BLE wraps in place, PICC SL with intermittent abx, reported chronic pain, scheduled methadone given per MAR, pt sleeping between cares  /64   Pulse 101   Temp 98.6  F (37  C) (Oral)   Resp 18   Ht 1.753 m (5' 9\")   Wt 120.2 kg (265 lb)   SpO2 100%   BMI 39.13 kg/m             "

## 2023-09-29 NOTE — CARE PLAN
DATE & TIME: 9/29/0700-1500    Cognitive Concerns/ Orientation : WNL    ABNL VS/O2: hypotension: asymptomatic   BP Readings from Last 1 Encounters:   09/29/23 95/54     MOBILITY: A1 gait belt, walker: use short distance up to chair and commode  PAIN MANAGMENT: Methadone scheduled   DIET: diabetic diet  BOWEL/BLADDER: incontinent of urine: constipation Last BM 9/22. Pt doesn't complain of feeling bloated or uncomfortable. Stool softener given.    DRAIN/DEVICES: purewick used at times  SKIN: see flowsheet  TESTS/PROCEDURES: Needs to be transferred per MD to vascular  D/C DATE: unknown    Roro Solis RN BSN

## 2023-09-29 NOTE — DISCHARGE SUMMARY
[place], [place]    Transfer Summary  Hospital Medicine    Date of Admission:  9/22/2023  Date of Transfer:  9/29/2023   Discharging Provider: Zacarias Holliday MD  Date of Service: 9/29/2023     Primary Care     Ish Brown  [facility] [address] [address]      Identification and Chief Compaint: Linda Loredo is a 78 year old female who presented on 9/22/2023 with complaint of being found down on a welfare check.    Reason for Transfer:  Evaluation by vascular for possible intervention to improve perfusion for wound healing.     Transfer Diagnosis:    # Left heel osteomyelitis     Additional Diagnoses this Admission  # Decubitus ulcer of left heal   # Left heel osteomyelitis   # Left foot cellulitis, improving   # Chronic venous statis of lower extremities   # Multiple bodily ulcers  # Nasal MRSA +VE  # Urine positive for Enteric faecalis, Staphylococcus aureus, and E. coli   # DM2  # CKD III  # Hypercalcemia   # pAfib  # Supratherapeutic INR, resolved   # Hx HTN  # Dyslipidemia   #Constipation      Medications Prior to Admission   Medication Sig Dispense Refill Last Dose    acetaminophen (TYLENOL) 325 MG tablet Take 2 tablets (650 mg) by mouth every 6 hours as needed for mild pain or other (and adjunct with moderate or severe pain or per patient request)   never uses at unknown    insulin degludec (TRESIBA) 200 UNIT/ML pen Inject 35 Units Subcutaneous At Bedtime Around 11-11:30 pm   9/25/2023 at 2200    lisinopril (ZESTRIL) 10 MG tablet Take 900 mg by mouth daily   9/26/2023 at 0900    metFORMIN (GLUCOPHAGE XR) 500 MG 24 hr tablet Take 2,000 mg by mouth daily (with dinner)   Past Week at supper    methadone (DOLOPHINE) 10 MG tablet Take 1 tablet (10 mg) by mouth daily At 1500.  Take in addition to the 5 mg three times daily dose 30 tablet 0 9/26/2023 at 0900    methadone (DOLOPHINE) 5 MG tablet Take 1 tablet (5 mg) by mouth 3  times daily At 0800, 1200 and 2100.  Take in addition to the 10 mg tablet at 1500. 50 tablet 0 9/26/2023 at 0900    naloxone (NARCAN) 4 MG/0.1ML nasal spray Spray 1 spray (4 mg) into one nostril alternating nostrils as needed for opioid reversal every 2-3 minutes until assistance arrives 0.2 mL 1 never used at on hand if needed    ONE TOUCH TEST STRIPS TEST   VI use as directed (Patient taking differently: 1 strip by In Vitro route daily ACHS) 100 prn Past Week at pm    oxyCODONE-acetaminophen (PERCOCET) 5-325 MG tablet Take 1-2 tablets by mouth every 6 hours as needed (leg pain)   Past Week at am    OZEMPIC, 0.25 OR 0.5 MG/DOSE, 2 MG/3ML pen Inject 0.5 mg Subcutaneous once a week On Monday evenings   9/18/2023 at pm    simvastatin (ZOCOR) 20 MG tablet Take 20 mg by mouth At Bedtime   9/26/2023 at 2330    torsemide (DEMADEX) 10 MG tablet Take 15 mg by mouth 2 times daily   9/26/2023 at 0900    vitamin C (ASCORBIC ACID) 500 MG tablet Take 500 mg by mouth daily   9/26/2023 at 0900    warfarin ANTICOAGULANT (COUMADIN) 2.5 MG tablet 21 - 3.75 mg  22 - 3.75 mg  23 - 2.5 mg  24 - 3.75 mg  25 - 2.5 mg  26 - 3.75 mg  27 - 2.5 mg  28 - 3.75 mg   9/24/2023 at 1700        Hospital Medications  Current Facility-Administered Medications   Medication    acetaminophen (TYLENOL) tablet 650 mg    Or    acetaminophen (TYLENOL) Suppository 650 mg    bisacodyl (DULCOLAX) suppository 10 mg    glucose gel 15-30 g    Or    dextrose 50 % injection 25-50 mL    Or    glucagon injection 1 mg    imipenem-cilastatin (PRIMAXIN) 750 mg in sodium chloride 0.9% 250 mL intermittent infusion    insulin aspart (NovoLOG) injection (RAPID ACTING)    insulin aspart (NovoLOG) injection (RAPID ACTING)    lidocaine (LMX4) kit    lidocaine 1 % 0.1-5 mL    lisinopril (ZESTRIL) tablet 5 mg    melatonin tablet 1 mg    methadone (DOLOPHINE) tablet 10 mg    methadone (DOLOPHINE) tablet 5 mg    mineral oil-hydrophilic petrolatum (AQUAPHOR)    naloxone (NARCAN)  injection 0.2 mg    Or    naloxone (NARCAN) injection 0.4 mg    Or    naloxone (NARCAN) injection 0.2 mg    Or    naloxone (NARCAN) injection 0.4 mg    ondansetron (ZOFRAN ODT) ODT tab 4 mg    Or    ondansetron (ZOFRAN) injection 4 mg    oxyCODONE (ROXICODONE) tablet 5 mg    Patient is already receiving anticoagulation with heparin, enoxaparin (LOVENOX), warfarin (COUMADIN)  or other anticoagulant medication    sennosides (SENOKOT) tablet 8.6 mg    simvastatin (ZOCOR) tablet 20 mg    sodium chloride (PF) 0.9% PF flush 10-40 mL    sodium chloride (PF) 0.9% PF flush 3 mL    sodium chloride (PF) 0.9% PF flush 3 mL    torsemide (DEMADEX) tablet 15 mg    vancomycin (VANCOCIN) 1,250 mg in sodium chloride 0.9 % 250 mL intermittent infusion    vitamin C (ASCORBIC ACID) tablet 500 mg    warfarin ANTICOAGULANT (COUMADIN) tablet 5 mg    Warfarin Dose Required Daily - Pharmacist Managed          Allergies   Allergies   Allergen Reactions    Prednisone Nausea and Vomiting    Morphine Nausea and Vomiting    Morphine [Fumaric Acid] Nausea and Vomiting       Consultations This Hospital Stay     Podiatry  [facility] infectious disease     PHYSICAL THERAPY ADULT IP CONSULT  WOUND OSTOMY CONTINENCE NURSE  IP CONSULT  PHARMACY TO DOSE WARFARIN  CARE MANAGEMENT / SOCIAL WORK IP CONSULT  OCCUPATIONAL THERAPY ADULT IP CONSULT  PODIATRY IP CONSULT  PHARMACY TO DOSE VANCO  PHARMACY TO DOSE WARFARIN  VASCULAR ACCESS ADULT IP CONSULT  LYMPHEDEMA THERAPY IP CONSULT  ORTHOSIS EXTREMITY LOWER REFERRAL IP CONSULT  PHARMACY TO DOSE VANCO  PHARMACY TO DOSE WARFARIN  PHYSICAL THERAPY ADULT IP CONSULT    Significant Results and Procedures   Procedures  Surgical debridement decubitus heel ulcer left lower extremity     Data      Results for orders placed or performed during the hospital encounter of 09/22/23   XR Chest Port 1 View    Narrative    EXAM: XR CHEST PORT 1 VIEW  LOCATION: [facility]  DATE: 9/22/2023    INDICATION:  weakness, elevated WBC, unclear source   rule out pneumonia  COMPARISON: 05/26/2023      Impression    IMPRESSION: Heart size is normal. Lungs are clear bilaterally. Mediastinum and visualized bony structures are stable in appearance. Interval removal of right-sided PICC line.   US Lower Extremity Venous Duplex Bilateral    Narrative    EXAM: US LOWER EXTREMITY VENOUS DUPLEX BILATERAL  LOCATION: [facility]  DATE: 9/23/2023    INDICATION: Bilateral calf/foot wounds; weeping  COMPARISON: None.  TECHNIQUE: Venous Duplex ultrasound of bilateral lower extremities with and without compression, augmentation and duplex. Color flow and spectral Doppler with waveform analysis performed.    FINDINGS: Exam includes the common femoral, femoral, popliteal veins as well as segmentally visualized deep calf veins and greater saphenous vein.     RIGHT: No deep vein thrombosis. No superficial thrombophlebitis. No popliteal cyst.    LEFT: No deep vein thrombosis. No superficial thrombophlebitis. No popliteal cyst.      Impression    IMPRESSION:    No deep venous thrombosis in the bilateral lower extremities.   XR Foot Port Right 3 Views    Narrative    EXAM: XR FOOT PORT RIGHT 3 VIEWS  LOCATION: [facility]  DATE: 9/25/2023    INDICATION: Necrosis of L heel looking for possible deeper infection ulcer  COMPARISON: None.      Impression    IMPRESSION: There appears to be a skin ulcer at the lateral/plantar hindfoot. Moderate soft tissue swelling around the ankle. No definite evidence of acute osteomyelitis although MRI would be more sensitive. No acute fracture or malalignment. Moderate   degenerative changes throughout the foot. Plate and screw fixation of the distal fibula. Osteopenia. Bony bunion with erosive change. Small plantar calcaneal enthesophyte. Vascular calcification.   XR Chest Port 1 View    Narrative    EXAM: XR CHEST PORT 1 VIEW  LOCATION: [facility]  DATE: 9/27/2023    INDICATION: RN placed PICC   verify tip placement  COMPARISON: Portable AP view of the chest 09/22/2023      Impression    IMPRESSION:     The right PICC has a loop near the confluence of the internal jugular and subclavian veins and then extends caudally to the mid SVC.    Unchanged enlargement of the cardiac silhouette. Vascular pedicle width is normal.    Symmetric lung inflation. No interstitial or alveolar opacities.   XR Chest Port 1 View    Narrative    EXAM: XR CHEST PORT 1 VIEW  LOCATION: [facility]  DATE: 9/27/2023    INDICATION: RN placed PICC   verify tip placement  COMPARISON: Chest radiograph earlier the same day.      Impression    IMPRESSION: Right PICC has been repositioned with tip now overlying the low SVC. Cardiomegaly. No focal consolidation, pleural effusion, or pneumothorax.   XR Chest Port 1 View    Narrative    EXAM: XR CHEST PORT 1 VIEW  LOCATION: [facility]  DATE: 9/27/2023    INDICATION: PICC tip  COMPARISON: 09/27/2023      Impression    IMPRESSION: PICC line tip is in the brachiocephalic vein, likely coiled in the internal jugular vein on the right.   US ALMA with PPG wo Exercise    Narrative    EXAM: RESTING ANKLE-BRACHIAL INDICES (ABIs)  LOCATION: [facility]  DATE: 9/28/2023    INDICATION: Bilateral lower leg wounds with history of poor healing and poor tolerance to compression.  COMPARISON: None.    ALMA FINDINGS:  RIGHT  Brachial: 0  Ankle (PT): -- Index: --  Ankle (DP): NC Index: -NC-  Digit: 119 Index: 1.03    LEFT  Brachial: 115  Ankle (PT): 201 Index: 1.75  Ankle (DP): 206 Index: 1.79  Digit: 52 Index: 0.45    The right ALMA at rest is noncompressible. The left ALMA at rest is 1.79.      WAVEFORMS: The dorsalis pedis and posterior tibial arteries are largely abnormal. On the right, there is blunted monophasic posterior tibial waveform with more brisk dorsal pedal waveform.  "On the left, posterior tibial and dorsal pedal waveforms are   markedly blunt and monophasic. Digital waveforms are very depressed as well.      Impression    IMPRESSION:  1.  RIGHT LOWER EXTREMITY: Noncompressible vessels. Significant peripheral vascular disease with posterior tibial predominant disease encountered.  2.  LEFT LOWER EXTREMITY: Supernormal ALMA likely related to vascular calcification with evidence of significant peripheral vascular disease.       History of Present Illness   From 9/23/2023 H&P   \"Linda Loredo is a 78 year old lady w/ hx of HTN, CKD III, HLD, Vit D def, obesity, pAfib on coumadin, DM2, venous stasis brought in by police after welfare check. Pt found on the floor, covered in excrement. History was limited as pt was lethargic and falling asleep. Hx was primarily obtained by the ER notes. Pt reports profound weakness erich of the legs w/ b/l leg pain.\"    Hospital Course    Linda Loredo is a 78 year old lady w/ hx of HTN, CKD III, HLD, Vit D def, obesity, pAfib on coumadin, DM2, venous stasis brought in by police after welfare check found to have osteomyelitis of left heal debrided in OR 9/26. Gram stain growing multiple organisms including Pseudomonas and Enterococcus. Hemodynamically stable since admission. ALMA completed 9/28 raised concern for ineffective blood flow affecting healing on LE and prompting transfer to facility with capability for vascular surgery evaluation.      # Decubitus ulcer of left heal   # Left heel osteomyelitis, with multiple organisms s/p debridement 9/26 with podiatry   # Left foot cellulitis, improving   # Chronic venous statis of lower extremities   # Multiple bodily ulcers  # Nasal MRSA +VE  # Urine positive for Enteric faecalis, Staphylococcus aureus, and E. coli   Patient has a long history of venous stasis ulcers initially unclear if her current ulcers are acutely changed however she was found to have an infected decubitus ulcer " of her left heel with osteomyelitis. Initial leukocytosis to 14 and pro-calcitonin to 0.27 on presentation though leukocytosis rapidly resolved. Cultures growing multiple organisms from debridement tissue including Pseudomonas aeruginosa, Morganella Livan I, Enterococcus faecalis, bordetella species and Proteus mirabilis. Sensitivities for bordetella pending. Discussing with infectious disease and plan to switch to Imipenem with continuation of vancomycin. With poor blood flow on ALMA concern raised by podiatry that would would not be able to heal and infection would advance prompting transfer for evaluation by vascular surgery.   - Switched cefepime to imipenem   - Continued vancomycin with MRSA history   - Bordetella sensitivities could affect final antibiotic plan   - PICC line placed 9/27  - Blood cultures from 9/25 without growth to date, final results pending   - Continued PTA Torsemide 15 mg every day   - Continued wound care   - Continued PTA methadone  - Continued PTA Oxycodone 5 mg Q6H prn   - Will likely require placement however patient resistant to this idea   - Non-weight bearing on left foot, elevate as able   - Will need ID follow up at discharge      # DM2  A1c 7.0 9/23/23. On deguldec 35 units HS PTA. Has had minimal sliding scale requirement.   - ISS for now / accuchecks / ADA diet  - Holding PTA Metformin   - Holding PTA Ozempic   - Holding PTA Degludec      # CKD III  # Hypercalcemia   - Daily bmp      # pAfib  # Supratherapeutic INR, resolved   -Continued warfarin      # Hx HTN  - Continued PTA lisinopril 5 mg      # Dyslipidemia   - Continued PTA simvastatin 20 mg every day      #Constipation  -Senna  -Patient in agreement with starting MiraLAX  if needed    Pending Results   Unresulted Labs Ordered in the Past 30 Days of this Admission       Date and Time Order Name Status Description    9/26/2023  5:00 PM Tissue Aerobic Bacterial Culture Routine Preliminary     9/26/2023  5:00 PM Bone Biopsy  Aerobic Bacterial Culture Routine Preliminary     9/26/2023  5:00 PM Anaerobic Bacterial Culture Routine Preliminary     9/25/2023  1:52 PM Blood Culture Arm, Right Preliminary     9/25/2023  1:52 PM Blood Culture Arm, Right Preliminary             Physical Exam   Temp:  [98.2  F (36.8  C)-98.7  F (37.1  C)] 98.7  F (37.1  C)  Pulse:  [] 82  Resp:  [16-18] 16  BP: ()/(54-87) 95/54  SpO2:  [96 %-100 %] 96 %  Vitals:    09/23/23 1300 09/26/23 1535   Weight: 120.5 kg (265 lb 10.5 oz) 120.2 kg (265 lb)     General Appearance:  Awake and alert sitting up in bed in no acute distress   Respiratory: Breathing easily on room air   Cardiovascular: Irregular rhythm and regular rate extremities warm and well perfused.   GI: Abdomen soft non-tender and non-distended   Skin: LE wrapped with some erythema and edema on shins, left foot wrapped, tenderness to palpation of bilateral lower extremities similar to yesterday.     The transfer was discussed with Patient and patients daughter are agreeable to transfer.     Total time on this transfer was 55 minutes .    Zacarias Holliday MD

## 2023-09-29 NOTE — PLAN OF CARE
Goal Outcome Evaluation:    Patient somnolent and oriented. Vitals stable; on room air. Pain in bilateral legs (L > R) 6/10, partially relieved with scheduled methadone and repositioning. Good appetite and oral fluid intake. Voiding spontaneously; no BM. PRN senna given.     Fine crackles in lung bases improving; currently heard only in LLL. No cough or change in O2 saturation at room air. Patient working on deep breathing and coughing independently. PICC saline locked.     Patient sat at bedside this evening and stood x 2 with Ax2, walker, and gait belt. Patient is NWB on LLE. She was also assisted with repositioning side-to-side according to PIP plan; legs elevated on pillows and heels floating. Dressings clean, dry, and intact.     Overall Patient Progress: improvingOverall Patient Progress: improving

## 2023-09-29 NOTE — PROGRESS NOTES
S: Pt seen at [facility] room 2307 with nurse available and patient alert bedside. O: I see the EPIC order for the DH2 shoe for the RT and Heel relief shoe for the LT, Patient measured size large. A: She was fit with the DH2 shoe by [employer] on the RT and a Globoped heel relief shoe for the LT. Instructions were given and seemed to be understood. The patient inspected the LT heel exposed out the back of the Globoped shoe so she can instruct or apply the shoe independently in the future. Both shoes were put on her personal bag on the window sill for when needed. P: FU PRN. G: The goal is to support and protect her fragile feet.  Electronically signed Jose Lott CPO, LPO.

## 2023-09-29 NOTE — PROGRESS NOTES
"Patient is transferring to [facility]- Ambulance transfer will be here between [id number].  /51 (BP Location: Left arm)   Pulse 84   Temp 98.2  F (36.8  C) (Oral)   Resp 16   Ht 1.753 m (5' 9\")   Wt 120.2 kg (265 lb)   SpO2 99%   BMI 39.13 kg/m      "

## 2023-09-29 NOTE — PROGRESS NOTES
Infectious Disease Curbside Note  I was called by Dr. Holliday on 9/29/2023 at 11:47 AM to provide input for Linda Loredo MRN [id number]. [facility] The nature of this request for a curbside consultation does not permit me to perform a comprehensive review of health care records, patient/family interview, nor an examination of the patient. I obtained limited patient information from the provider on the phone call and a limited chart review.    Based on only the information I was provided today, I make the following recommendations to the treating provider/team for their review and consideration:     The pt has osteomyelitis of the heel and bone cultures are growing bacteroides, PsA, proteus, morganella, S. Aureus (no susceptibility but prior MRSA in the urine was noted), bordetella, E. Faecalis. The pt has high MICs to Cefepime and intermediate to zosyn. Therefore, the pt will need Imipenem/cilstatin 750mg IV Q8hrs and Vancomycin IV (for MRSA coverage). Will likely need 6 weeks of abx. Will need to follow up with [facility] ID clinic in 2 weeks and at that time the pt can be assessed if a PO switch could be considered.       These recommendations are not intended to take the place of the care team's clinical judgement, which should always be utilized to provide the most appropriate care to meet the unique needs of each patient. The recommendations offered were based on the limited scope of information provided as today's date. Should additional guidance be needed or required a formal consultation with infectious diseases is recommended.    *Primary team: If a patient is discharged on IV antibiotics it is not the responsibility of the ID curbside provider to monitor labs or sign for antibiotic orders unless it is otherwise written by the curbside provider. If further outpatient management is required then place an outpatient ID consult and call [phone] to  facilitate making an ID appointment before discharge.

## 2023-09-29 NOTE — PROGRESS NOTES
Care Management Follow Up    Length of Stay (days): 6    Expected Discharge Date: 09/30/2023     Concerns to be Addressed: adjustment to diagnosis/illness, basic needs     Patient plan of care discussed at interdisciplinary rounds: Yes    Anticipated Discharge Disposition: Transitional Care     Anticipated Discharge Services: Transportation Services  Anticipated Discharge DME: None    Patient/family educated on Medicare website which has current facility and service quality ratings: yes  Education Provided on the Discharge Plan: Yes  Patient/Family in Agreement with the Plan: other (see comments) ([place] apartment[place]    Referrals Placed by CM/SW: External Care Coordination, Post Acute Facilities, Transportation  Private pay costs discussed: Not applicable    Additional Information:    Patient is not medically ready for discharge today. Per notes- patient may need to transfer for vascular.     Patient has been accepted by [facility] (Admissions Phone: [phone] Main Phone: [phone] Fax: [phone]) for TCU cares when medically stable.     CM will continue to follow for discharge planning.     BEN STODDARD RN

## 2023-09-29 NOTE — PLAN OF CARE
Lymphedema Therapy: Lymphedema order received and acknowledged. Based on ALMA findings, compression is contraindicated at this time, discussed with pt, nurse, MD, and WOC. Will discontinue orders.

## 2023-09-29 NOTE — PROGRESS NOTES
RECEIVING UNIT HANDOFF REVIEW   Nurse Handoff Report was reviewed by: Fabian Huston RN on September 29, 2023 at 4:15 PM

## 2023-09-29 NOTE — CONSULTS
Call received from PT about ALMA results. Based on results, this writer recommended NOT compressing at this time. Concern also for wound healing based on ALMA results.    This writer notified Dr. Thorne and Zacarias Holliday MD alerting them about significant PVD BLE and asking whether pt is appropriate to be transferred.    Called and spoke with Meghana Peace, charge nurse who spoke with Dr. Holliday. He states he will talk with Dr. Thorne today.    Anastasia Gray RN, CWOCN  [phone]

## 2023-09-30 ENCOUNTER — APPOINTMENT (OUTPATIENT)
Dept: ULTRASOUND IMAGING | Facility: CLINIC | Age: 78
DRG: 617 | End: 2023-09-30
Attending: HOSPITALIST
Payer: COMMERCIAL

## 2023-09-30 LAB
ANION GAP SERPL CALCULATED.3IONS-SCNC: 9 MMOL/L (ref 7–15)
BACTERIA BLD CULT: NO GROWTH
BACTERIA BLD CULT: NO GROWTH
BACTERIA BONE ANAEROBE+AEROBE CULT: ABNORMAL
BACTERIA BONE ANAEROBE+AEROBE CULT: ABNORMAL
BUN SERPL-MCNC: 30.5 MG/DL (ref 8–23)
CALCIUM SERPL-MCNC: 9.2 MG/DL (ref 8.8–10.2)
CHLORIDE SERPL-SCNC: 98 MMOL/L (ref 98–107)
CHOLEST SERPL-MCNC: 80 MG/DL
CHOLEST SERPL-MCNC: 82 MG/DL
CREAT SERPL-MCNC: 1.15 MG/DL (ref 0.51–0.95)
DEPRECATED HCO3 PLAS-SCNC: 30 MMOL/L (ref 22–29)
EGFRCR SERPLBLD CKD-EPI 2021: 49 ML/MIN/1.73M2
ERYTHROCYTE [DISTWIDTH] IN BLOOD BY AUTOMATED COUNT: 12.8 % (ref 10–15)
ERYTHROCYTE [DISTWIDTH] IN BLOOD BY AUTOMATED COUNT: 13 % (ref 10–15)
GLUCOSE BLDC GLUCOMTR-MCNC: 131 MG/DL (ref 70–99)
GLUCOSE BLDC GLUCOMTR-MCNC: 147 MG/DL (ref 70–99)
GLUCOSE BLDC GLUCOMTR-MCNC: 157 MG/DL (ref 70–99)
GLUCOSE BLDC GLUCOMTR-MCNC: 184 MG/DL (ref 70–99)
GLUCOSE BLDC GLUCOMTR-MCNC: 190 MG/DL (ref 70–99)
GLUCOSE BLDC GLUCOMTR-MCNC: 194 MG/DL (ref 70–99)
GLUCOSE SERPL-MCNC: 170 MG/DL (ref 70–99)
HCT VFR BLD AUTO: 27.3 % (ref 35–47)
HCT VFR BLD AUTO: 29.1 % (ref 35–47)
HDLC SERPL-MCNC: 35 MG/DL
HDLC SERPL-MCNC: 35 MG/DL
HGB BLD-MCNC: 9.1 G/DL (ref 11.7–15.7)
HGB BLD-MCNC: 9.2 G/DL (ref 11.7–15.7)
INR PPP: 1.91 (ref 0.85–1.15)
LDLC SERPL CALC-MCNC: 33 MG/DL
LDLC SERPL CALC-MCNC: 37 MG/DL
MCH RBC QN AUTO: 29.5 PG (ref 26.5–33)
MCH RBC QN AUTO: 30.8 PG (ref 26.5–33)
MCHC RBC AUTO-ENTMCNC: 31.6 G/DL (ref 31.5–36.5)
MCHC RBC AUTO-ENTMCNC: 33.3 G/DL (ref 31.5–36.5)
MCV RBC AUTO: 93 FL (ref 78–100)
MCV RBC AUTO: 93 FL (ref 78–100)
NONHDLC SERPL-MCNC: 45 MG/DL
NONHDLC SERPL-MCNC: 47 MG/DL
PLATELET # BLD AUTO: 142 10E3/UL (ref 150–450)
PLATELET # BLD AUTO: 148 10E3/UL (ref 150–450)
POTASSIUM SERPL-SCNC: 3.6 MMOL/L (ref 3.4–5.3)
RBC # BLD AUTO: 2.95 10E6/UL (ref 3.8–5.2)
RBC # BLD AUTO: 3.12 10E6/UL (ref 3.8–5.2)
SODIUM SERPL-SCNC: 137 MMOL/L (ref 135–145)
TRIGL SERPL-MCNC: 49 MG/DL
TRIGL SERPL-MCNC: 58 MG/DL
UFH PPP CHRO-ACNC: >1.1 IU/ML
WBC # BLD AUTO: 8.2 10E3/UL (ref 4–11)
WBC # BLD AUTO: 8.5 10E3/UL (ref 4–11)

## 2023-09-30 PROCEDURE — 99222 1ST HOSP IP/OBS MODERATE 55: CPT | Mod: GC | Performed by: SURGERY

## 2023-09-30 PROCEDURE — 83718 ASSAY OF LIPOPROTEIN: CPT | Performed by: HOSPITALIST

## 2023-09-30 PROCEDURE — 93925 LOWER EXTREMITY STUDY: CPT

## 2023-09-30 PROCEDURE — 999N000040 HC STATISTIC CONSULT NO CHARGE VASC ACCESS

## 2023-09-30 PROCEDURE — 250N000011 HC RX IP 250 OP 636: Mod: JZ | Performed by: INTERNAL MEDICINE

## 2023-09-30 PROCEDURE — 250N000011 HC RX IP 250 OP 636: Performed by: INTERNAL MEDICINE

## 2023-09-30 PROCEDURE — 85027 COMPLETE CBC AUTOMATED: CPT | Performed by: INTERNAL MEDICINE

## 2023-09-30 PROCEDURE — 250N000012 HC RX MED GY IP 250 OP 636 PS 637: Performed by: INTERNAL MEDICINE

## 2023-09-30 PROCEDURE — 85610 PROTHROMBIN TIME: CPT | Performed by: INTERNAL MEDICINE

## 2023-09-30 PROCEDURE — 85027 COMPLETE CBC AUTOMATED: CPT | Performed by: HOSPITALIST

## 2023-09-30 PROCEDURE — 99233 SBSQ HOSP IP/OBS HIGH 50: CPT | Performed by: HOSPITALIST

## 2023-09-30 PROCEDURE — 99222 1ST HOSP IP/OBS MODERATE 55: CPT | Performed by: PODIATRIST

## 2023-09-30 PROCEDURE — 250N000013 HC RX MED GY IP 250 OP 250 PS 637: Performed by: INTERNAL MEDICINE

## 2023-09-30 PROCEDURE — 250N000011 HC RX IP 250 OP 636: Mod: JZ | Performed by: HOSPITALIST

## 2023-09-30 PROCEDURE — 999N000128 HC STATISTIC PERIPHERAL IV START W/O US GUIDANCE

## 2023-09-30 PROCEDURE — 80048 BASIC METABOLIC PNL TOTAL CA: CPT | Performed by: INTERNAL MEDICINE

## 2023-09-30 PROCEDURE — 120N000001 HC R&B MED SURG/OB

## 2023-09-30 PROCEDURE — 258N000003 HC RX IP 258 OP 636: Performed by: INTERNAL MEDICINE

## 2023-09-30 PROCEDURE — 85520 HEPARIN ASSAY: CPT | Performed by: HOSPITALIST

## 2023-09-30 PROCEDURE — 93922 UPR/L XTREMITY ART 2 LEVELS: CPT

## 2023-09-30 PROCEDURE — 258N000003 HC RX IP 258 OP 636: Performed by: HOSPITALIST

## 2023-09-30 PROCEDURE — 250N000013 HC RX MED GY IP 250 OP 250 PS 637: Performed by: HOSPITALIST

## 2023-09-30 PROCEDURE — 99222 1ST HOSP IP/OBS MODERATE 55: CPT | Performed by: INTERNAL MEDICINE

## 2023-09-30 RX ORDER — HEPARIN SODIUM 10000 [USP'U]/100ML
0-5000 INJECTION, SOLUTION INTRAVENOUS CONTINUOUS
Status: DISCONTINUED | OUTPATIENT
Start: 2023-09-30 | End: 2023-10-20

## 2023-09-30 RX ORDER — ATORVASTATIN CALCIUM 40 MG/1
40 TABLET, FILM COATED ORAL EVERY EVENING
Status: DISCONTINUED | OUTPATIENT
Start: 2023-09-30 | End: 2023-10-24 | Stop reason: HOSPADM

## 2023-09-30 RX ORDER — ASPIRIN 81 MG/1
81 TABLET ORAL DAILY
Status: DISCONTINUED | OUTPATIENT
Start: 2023-09-30 | End: 2023-10-24 | Stop reason: HOSPADM

## 2023-09-30 RX ORDER — DEXTROSE MONOHYDRATE 25 G/50ML
25-50 INJECTION, SOLUTION INTRAVENOUS
Status: DISCONTINUED | OUTPATIENT
Start: 2023-09-30 | End: 2023-09-30

## 2023-09-30 RX ORDER — MEROPENEM 500 MG/1
500 INJECTION, POWDER, FOR SOLUTION INTRAVENOUS EVERY 6 HOURS
Status: DISCONTINUED | OUTPATIENT
Start: 2023-09-30 | End: 2023-10-08

## 2023-09-30 RX ORDER — NICOTINE POLACRILEX 4 MG
15-30 LOZENGE BUCCAL
Status: DISCONTINUED | OUTPATIENT
Start: 2023-09-30 | End: 2023-09-30

## 2023-09-30 RX ADMIN — METHADONE HYDROCHLORIDE 10 MG: 10 TABLET ORAL at 14:50

## 2023-09-30 RX ADMIN — INSULIN ASPART 1 UNITS: 100 INJECTION, SOLUTION INTRAVENOUS; SUBCUTANEOUS at 18:07

## 2023-09-30 RX ADMIN — HEPARIN SODIUM 1200 UNITS/HR: 10000 INJECTION, SOLUTION INTRAVENOUS at 14:43

## 2023-09-30 RX ADMIN — MEROPENEM 500 MG: 500 INJECTION, POWDER, FOR SOLUTION INTRAVENOUS at 18:04

## 2023-09-30 RX ADMIN — ATORVASTATIN CALCIUM 40 MG: 40 TABLET, FILM COATED ORAL at 21:50

## 2023-09-30 RX ADMIN — ASPIRIN 81 MG: 81 TABLET, COATED ORAL at 14:50

## 2023-09-30 RX ADMIN — METHADONE HYDROCHLORIDE 5 MG: 5 TABLET ORAL at 21:50

## 2023-09-30 RX ADMIN — IMIPENEM AND CILASTATIN SODIUM 750 MG: 250; 250 INJECTION, POWDER, FOR SOLUTION INTRAVENOUS at 03:27

## 2023-09-30 RX ADMIN — INSULIN ASPART 1 UNITS: 100 INJECTION, SOLUTION INTRAVENOUS; SUBCUTANEOUS at 11:27

## 2023-09-30 RX ADMIN — METHADONE HYDROCHLORIDE 5 MG: 5 TABLET ORAL at 06:12

## 2023-09-30 RX ADMIN — MEROPENEM 500 MG: 500 INJECTION, POWDER, FOR SOLUTION INTRAVENOUS at 11:57

## 2023-09-30 RX ADMIN — INSULIN ASPART 1 UNITS: 100 INJECTION, SOLUTION INTRAVENOUS; SUBCUTANEOUS at 06:37

## 2023-09-30 RX ADMIN — INSULIN ASPART 2 UNITS: 100 INJECTION, SOLUTION INTRAVENOUS; SUBCUTANEOUS at 04:17

## 2023-09-30 RX ADMIN — METHADONE HYDROCHLORIDE 5 MG: 5 TABLET ORAL at 11:23

## 2023-09-30 RX ADMIN — VANCOMYCIN HYDROCHLORIDE 1250 MG: 10 INJECTION, POWDER, LYOPHILIZED, FOR SOLUTION INTRAVENOUS at 21:50

## 2023-09-30 RX ADMIN — INSULIN DEGLUDEC 15 UNITS: 100 INJECTION, SOLUTION SUBCUTANEOUS at 22:20

## 2023-09-30 ASSESSMENT — ACTIVITIES OF DAILY LIVING (ADL)
ADLS_ACUITY_SCORE: 34
ADLS_ACUITY_SCORE: 36
ADLS_ACUITY_SCORE: 34
ADLS_ACUITY_SCORE: 36
ADLS_ACUITY_SCORE: 34
DIFFICULTY_EATING/SWALLOWING: NO
ADLS_ACUITY_SCORE: 40
WEAR_GLASSES_OR_BLIND: YES
ADLS_ACUITY_SCORE: 36
CONCENTRATING,_REMEMBERING_OR_MAKING_DECISIONS_DIFFICULTY: NO
ADLS_ACUITY_SCORE: 36

## 2023-09-30 NOTE — CONSULTS
VASCULAR SURGERY INPATIENT CONSULTATION / Initial In-Patient visit    VASCULAR SURGEON: Dr Ryan    LOCATION: [facility]    Linda Loredo  Medical Record #:  [id number]  YOB: 1945  Age:  78 year old     Date of Service: 9/29/2023    PRIMARY CARE PROVIDER: Ish Brown    Reason for consultation:  Left heel ulcer    IMPRESSION: Patient with significant lymphedema and venous skin changes to bilateral legs with large left heel ulcer recently underwent debridement at outside hospital now transferred here for vascular evaluation.  Podiatry evaluated patient, no further surgical plans at this time.  Team did mention potential need for amputation if wound worsen or fail to heal, patient not interested at that time.    RECOMMENDATION: We will obtain noninvasive studies with bilateral ALMA and arterial duplex.  Agree with broad-spectrum antibiotics.  Podiatry recommending wound VAC placement.  Further plan pending continued work-up, no intervention planned today.  Recommend starting ASA 81 mg daily if no other contraindication, high-dose statin, glucose control for A1c less than 7.  Vascular to follow.    HPI:  Linda Loredo is a 78 year old female who was seen today in consultation for left heel wound.  Patient is a 78-year-old female who has past medical history of insulin dependent diabetes with A1c of 7, atrial fibrillation on Coumadin, hypertension, hyperlipidemia, CKD stage III with baseline creatinine around 1.2, BERLIN, and bilateral venous insufficiency with lymphedema and venous wounds who presented to outside hospital with worsening left heel wound.  Patient notes presence of heel wound over past year that has slowly worsened and failed to heal..  She denies any previous vascular intervention and has attempted lymphedema therapy in the past but is unable to wear compression socks due to wound on left foot.  She was seen at outside hospital where  "podiatry performed irrigation and debridement and was transferred here for vascular evaluation, podiatry now following as well.  She is nonambulatory at baseline and uses a wheelchair, states she was able to ambulate 1 year ago but has wounds have progressed she cannot bear weight on left foot.  Non-smoker, does not take aspirin but compliant with statin.  Denies any history of claudication or rest pain.    PHH:    Past Medical History:   Diagnosis Date    Depressive disorder, not elsewhere classified     Herpes zoster without mention of complication     Hypercalcemia 2/24/2007    Mild intermittent asthma     Other urinary incontinence     Type II or unspecified type diabetes mellitus with renal manifestations, uncontrolled(250.42) (H)     Type II or unspecified type diabetes mellitus without mention of complication, not stated as uncontrolled     Unspecified essential hypertension          Past Surgical History:   Procedure Laterality Date    CHOLECYSTECTOMY      INCISION AND DRAINAGE FOOT, COMBINED Left 9/26/2023    Procedure: Surgical debridement of all nonviable skin and soft tissue and bone left foot;  Surgeon: Nolberto Thorne DPM;  Location: [facility] OR    ligation of fallopian tube      OPEN REDUCTION INTERNAL FIXATION ANKLE  10/13/11    left    pinning of left 2nd toe          ALLERGIES:     Allergies   Allergen Reactions    Prednisone Nausea and Vomiting    Morphine Nausea and Vomiting    Morphine [Fumaric Acid] Nausea and Vomiting        MEDS:  Confirmed in MAR    SOCIAL HABITS: Non smoker      REVIEW OF SYSTEMS:    A 12 point ROS was reviewed and except for what is listed in the HPI above, all others are negative    PE:    Vital signs:  Temp: 97.5  F (36.4  C) Temp src: Oral BP: 128/66 Pulse: 82   Resp: 16 SpO2: 93 % O2 Device: None (Room air)   Height: 167.6 cm (5' 6\") Weight: 116.4 kg (256 lb 9.9 oz)  Estimated body mass index is 41.42 kg/m  as calculated from the following:    Height as of this " "encounter: 1.676 m (5' 6\").    Weight as of this encounter: 116.4 kg (256 lb 9.9 oz).       Wt Readings from Last 1 Encounters:   09/29/23 116.4 kg (256 lb 9.9 oz)     Body mass index is 41.42 kg/m .    EXAM:  GENERAL: This is a well-developed 78 year old female who appears her stated age  CARDIAC:   Regular rate  CHEST/LUNG:   Increased work of breathing with nasal cannula  GASTROINTESINAL (ABDOMEN): Soft, nontender  MUSCULOSKELETAL: Significant edema and obesity to bilateral lower extremities, strength and sensation intact bilaterally  HEME/LYMPH: notable lymphedema laterally  NEUROLOGIC: Focally intact, Alert and oriented x 3.   PSYCH: appropriate affect  INTEGUMENT: Chronic skin changes to bilateral lower extremity below the knee secondary to edema.  Large ulceration on left heel.  VASCULAR: Right lower extremity with 2+ DP, left lower extremity with strong multiphasic DP, unable to find signal in PT.          DIAGNOSTIC STUDIES:     Images:  US ALMA with PPG wo Exercise     IMPRESSION: 1.  RIGHT LOWER EXTREMITY: Noncompressible vessels. Significant peripheral vascular disease with posterior tibial predominant disease encountered. 2.  LEFT LOWER EXTREMITY: Supernormal ALMA likely related to vascular calcification with evidence of significant peripheral vascular disease.      US Lower Extremity Venous Duplex Bilateral  IMPRESSION: No deep venous thrombosis in the bilateral lower extremities.        I personally reviewed the images and my interpretation is strong PVRs bilaterally with possibly falsely elevated ABIs, toe pressure >100 bilaterally    LABS:      Na 137  Cr 1.15  WBC 8.5  Hgb 9.2  INR 1.9      Total time spent 30 minutes face to face with patient with more than 50% time spent in counseling and coordination of care.    Clover Dorman DO  [facility] Vascular Surgery     STAFF: Reviewed with Dr. Dorman.  Has left heel ulcer already debrided by podiatry.  Evidence of tibial PAD..  Appears to have " adequate blood supply in the dorsalis pedis artery with multiphasic waveforms and palpable DP pulse.  However PT pulses not palpable with decreased waveforms.  This is a concern since the angio sound to the heel is primarily related to the posterior tibial and peroneal arteries.  Due to this we would consider angiography to evaluate the tibial arteries further particular the posterior tibial artery to maximize the arterial blood flow and chances for healing.      Nathan Ryan MD

## 2023-09-30 NOTE — H&P
[facility]    History and Physical - Hospitalist Service       Date of Admission:  9/29/2023    Assessment & Plan      Linda ELIE Loredo is a 78 year old female admitted on 9/29/2023. She presents as a transfer from [facility] for L heel osteomyelitis    L foot decubitus ulcer  L heel osteomyelitis  L foot cellulitis  Chronic venous stasis  *brought in 9/23 after welfare check by police found to have L leg swelling and erythema  *Hx venous stasis ulcers. Presents with L heel decubitus ulcer with osteo. Wound with multiple organisms incl pseudomonas, morganella, e faecalis, bordetella, proteus. Underwent debridement 9/26 by podiatry.   *ALMA w/ poor flow, podiatry requesting vascular surgery consult prior to transfer  *on arrival sl hypotensive, other VSS.   - vascular surgery consult  - Podiatry consult  - ID consult  - WOC consult  - continue vanco, imipenem as per previus rec.   - follow up blood cultures  - will keep NPO pending vascular surgery, podiatry plans  - continue methadone 10 mg daily midday and 5 mg TID, dilaudid for breakthrough  - will hold torsemide 15 mg BID while NPO  - ? MRI, defer to podiatry    DM II  [PTA degludec 35 units at HS, metformin 2000 mg daily, ozempic]  A1c 7.0 9/23/23. Has been on insulin pump in the past but this was changed to tresiba 8/2023.   *long acting insulin held during hospital stay with surprisingly good blood sugars  - med sliding scale insulin  - q4 blood sugars while NPO  - hold metformin, ozempic  - hold degludec    Atrial fibrillation  HTN  HLD  [PTA- warfarin, simvastatin 20 mg daily, lisinopril 5 mg daily]  - hold ACE for possible procedure  - resume statin  - will hold warfarin for now pending vascular plans  - check INR in the am    CKD III  Baseline creatinine 1-1.2. 9/29 at 1.18.   - avoid nephrotoxins  - monitor for now    Anemia  Hgb at 9.3, baseline recent 9-10.   - monitor    Failure to thrive  As above, called by police for  "welfare check. ? Safety at home. SW had seen at [facility].   - SW consult    BERLIN  - continue home cpap     Diet: NPO for Medical/Clinical Reasons Except for: Meds, Ice ChipsNPO  DVT Prophylaxis: Warfarin  Braga Catheter: Not present  Lines: PRESENT             Cardiac Monitoring: None  Code Status: Full CodeFull    Clinically Significant Risk Factors Present on Admission                 # Drug Induced Coagulation Defect: home medication list includes an anticoagulant medication      # Hypertension: Noted on problem list     # DMII: A1C = 7.0 % (Ref range: <5.7 %) within past 6 months      # Severe Obesity: Estimated body mass index is 41.42 kg/m  as calculated from the following:    Height as of this encounter: 1.676 m (5' 6\").    Weight as of this encounter: 116.4 kg (256 lb 9.9 oz).              Disposition Plan      Expected Discharge Date: 10/01/2023                Jose Carrera MD  Hospitalist Service  [facility]  Securely message with [employer] (more info)  Text page via [employer] Paging/Directory     ______________________________________________________________________    Chief Complaint   Diabetic foot ulcer    History is obtained from the patient, electronic health record, and emergency department physician    History of Present Illness   Linda Loredo is a 78 year old female who presents as a transfer from [facility] with diabetic foot ulcer.  Patient reports she has had diabetes since 2001.  She  also has neuropathy and has been dealing with leg pain.  She also notes she has edema and has been dealing with lower extremity ulcers.  She apparently had please call for welfare check and was brought to the hospital.  On arrival she had somnolence and weakness in her legs as well as pain.  Found to have a left heel decubitus/foot ulcer.  Podiatry evaluated and debrided the wound.  She had ABIs due to some concern for vascular disease so she was transferred to [place] [facility].  Her wound was polymicrobial and she has been started on vancomycin and imipenem.    On arrival she is very pleasant and alert.  She states she does have some pain in her legs that occasionally gets worse that she is deals with.  She is on methadone for pain management 4 times a day.  She denies any chest pain or shortness of breath.      Past Medical History    Past Medical History:   Diagnosis Date    Depressive disorder, not elsewhere classified     Herpes zoster without mention of complication     Hypercalcemia 2/24/2007    Mild intermittent asthma     Other urinary incontinence     Type II or unspecified type diabetes mellitus with renal manifestations, uncontrolled(250.42) (H)     Type II or unspecified type diabetes mellitus without mention of complication, not stated as uncontrolled     Unspecified essential hypertension        Past Surgical History   Past Surgical History:   Procedure Laterality Date    CHOLECYSTECTOMY      INCISION AND DRAINAGE FOOT, COMBINED Left 9/26/2023    Procedure: Surgical debridement of all nonviable skin and soft tissue and bone left foot;  Surgeon: Nolberto Thorne DPM;  Location: [facility] OR    ligation of fallopian tube      OPEN REDUCTION INTERNAL FIXATION ANKLE  10/13/11    left    pinning of left 2nd toe         Prior to Admission Medications   Prior to Admission Medications   Prescriptions Last Dose Informant Patient Reported? Taking?   ONE TOUCH TEST STRIPS TEST   VI  Self No No   Sig: use as directed   Patient taking differently: 1 strip by In Vitro route daily ACHS   OZEMPIC, 0.25 OR 0.5 MG/DOSE, 2 MG/3ML pen  Self Yes No   Sig: Inject 0.5 mg Subcutaneous once a week On Monday evenings   acetaminophen (TYLENOL) 325 MG tablet  Self No No   Sig: Take 2 tablets (650 mg) by mouth every 6 hours as needed for mild pain or other (and adjunct with moderate or severe pain or per patient request)   insulin degludec (TRESIBA) 200 UNIT/ML pen  Self Yes No   Sig: Inject 35  Units Subcutaneous At Bedtime Around 11-11:30 pm   lisinopril (ZESTRIL) 5 MG tablet  Self Yes No   Sig: Take 5 mg by mouth daily   metFORMIN (GLUCOPHAGE XR) 500 MG 24 hr tablet  Self Yes No   Sig: Take 2,000 mg by mouth daily (with dinner)   methadone (DOLOPHINE) 10 MG tablet  Self No No   Sig: Take 1 tablet (10 mg) by mouth daily At 1500.  Take in addition to the 5 mg three times daily dose   methadone (DOLOPHINE) 5 MG tablet  Self No No   Sig: Take 1 tablet (5 mg) by mouth 3 times daily At 0800, 1200 and 2100.  Take in addition to the 10 mg tablet at 1500.   naloxone (NARCAN) 4 MG/0.1ML nasal spray  Self No No   Sig: Spray 1 spray (4 mg) into one nostril alternating nostrils as needed for opioid reversal every 2-3 minutes until assistance arrives   oxyCODONE-acetaminophen (PERCOCET) 5-325 MG tablet  Self Yes No   Sig: Take 1-2 tablets by mouth every 6 hours as needed (leg pain)   simvastatin (ZOCOR) 20 MG tablet  Self Yes No   Sig: Take 20 mg by mouth At Bedtime   torsemide (DEMADEX) 10 MG tablet  Self Yes No   Sig: Take 15 mg by mouth 2 times daily   vitamin C (ASCORBIC ACID) 500 MG tablet   Yes No   Sig: Take 500 mg by mouth daily   warfarin ANTICOAGULANT (COUMADIN) 2.5 MG tablet  Self Yes No   Si - 3.75 mg  22 - 3.75 mg  23 - 2.5 mg  24 - 3.75 mg  25 - 2.5 mg  26 - 3.75 mg  27 - 2.5 mg  28 - 3.75 mg      Facility-Administered Medications: None        Review of Systems    The 10 point Review of Systems is negative other than noted in the HPI or here.     Social History   I have reviewed this patient's social history and updated it with pertinent information if needed.  Social History     Tobacco Use    Smoking status: Never    Smokeless tobacco: Never   Substance Use Topics    Alcohol use: No    Drug use: No        Physical Exam   Vital Signs: Temp: 98.3  F (36.8  C) Temp src: Oral BP: 96/40 Pulse: 85   Resp: 18 SpO2: 96 % O2 Device: None (Room air)    Weight: 256 lbs 9.85 oz    General Appearance: Alert,  very pleasant, no distress  Respiratory: clear bilaterally  Cardiovascular: irregular, no murmur. LE edema (wrapped))  GI: obese, doesn't appear tender  Skin: feet wrapped, dressings c/d/I, not examined  Other: CN grossly intact, RICH      Medical Decision Making       80 MINUTES SPENT BY ME on the date of service doing chart review, history, exam, documentation & further activities per the note.      Data     I have personally reviewed the following data over the past 24 hrs:    8.7  \   9.3 (L)   / 156     136 98 29.9 (H) /  180 (H)   3.7 30 (H) 1.18 (H) \     INR:  1.70 (H) PTT:  N/A   D-dimer:  N/A Fibrinogen:  N/A       Imaging results reviewed over the past 24 hrs:   No results found for this or any previous visit (from the past 24 hour(s)).

## 2023-09-30 NOTE — PLAN OF CARE
Summary:  Cellulites/Osteomyelitis of left heel.     DATE & TIME: 9/29/23 night shift [id number]    Cognitive Concerns/ Orientation : A&OX4   BEHAVIOR & AGGRESSION TOOL COLOR: Green  CIWA SCORE: NA   ABNL VS/O2: VSS @RA, Soft BP  MOBILITY: Stand and Pivot to BSC  PAIN MANAGMENT: 6/10 on BLE  DIET: NPO possible surgery  BOWEL/BLADDER: Continent. Purewick in placed  ABNL LAB/BG: NA  DRAIN/DEVICES: PICC on right upper arm. With intermittent of antibiotics   TELEMETRY RHYTHM: NA  SKIN: Multiple wound on BLE and coccyx-meplilex in placed   TESTS/PROCEDURES: Penidng  D/C DAY/GOALS/PLACE: Pending  OTHER IMPORTANT INFO: Pt able to make her needs known. Dressing changed to the LLE.

## 2023-09-30 NOTE — CONSULTS
PATIENT HISTORY:  Linda Loredo is a 78 year old female who was transferred to [facility] for vascular intervention.    I was asked to see Linda Loredo  by  for left foot ulcer.    Patient was admitted to [facility] 1 week ago.  She was found to have a left heel ulcer.  She underwent surgical debridement of this with Dr. Thorne.  They also did blood flow tests and found that her blood flow was lacking.  She was transferred to [facility] to be evaluated by vascular services to try to improve blood flow and improve healing.    Patient was seen at bedside. Notes a lot of pain to both legs, some to the left foot.       Review of Systems:  Patient denies fever, chills, rash,  stiffness, limping,  weakness, heart burn, blood in stool, chest pain with activity, calf pain when walking, shortness of breath with activity, chronic cough, easy bleeding/bruising, swelling of ankles, excessive thirst, fatigue, depression, anxiety.  Patient admits to wound, numbness.     PAST MEDICAL HISTORY:   Past Medical History:   Diagnosis Date    Depressive disorder, not elsewhere classified     Herpes zoster without mention of complication     Hypercalcemia 2/24/2007    Mild intermittent asthma     Other urinary incontinence     Type II or unspecified type diabetes mellitus with renal manifestations, uncontrolled(250.42) (H)     Type II or unspecified type diabetes mellitus without mention of complication, not stated as uncontrolled     Unspecified essential hypertension         PAST SURGICAL HISTORY:   Past Surgical History:   Procedure Laterality Date    CHOLECYSTECTOMY      INCISION AND DRAINAGE FOOT, COMBINED Left 9/26/2023    Procedure: Surgical debridement of all nonviable skin and soft tissue and bone left foot;  Surgeon: Nolberto Thorne DPM;  Location: [facility] OR    ligation of fallopian tube      OPEN REDUCTION INTERNAL FIXATION ANKLE  10/13/11    left    pinning of  left 2nd toe          MEDICATIONS:   Current Facility-Administered Medications:     acetaminophen (TYLENOL) tablet 650 mg, 650 mg, Oral, Q6H PRN **OR** acetaminophen (TYLENOL) Suppository 650 mg, 650 mg, Rectal, Q6H PRN, Jose Carrera MD    acetaminophen (TYLENOL) tablet 650 mg, 650 mg, Oral, Q6H PRN **OR** acetaminophen (TYLENOL) Suppository 650 mg, 650 mg, Rectal, Q6H PRN, Jose Carrera MD    glucose gel 15-30 g, 15-30 g, Oral, Q15 Min PRN **OR** dextrose 50 % injection 25-50 mL, 25-50 mL, Intravenous, Q15 Min PRN **OR** glucagon injection 1 mg, 1 mg, Subcutaneous, Q15 Min PRN, Jose Carrera MD    HYDROmorphone (PF) (DILAUDID) injection 0.5 mg, 0.5 mg, Intravenous, Q2H PRN, Jose Carrera MD    imipenem-cilastatin (PRIMAXIN) 750 mg in sodium chloride 0.9 % 250 mL intermittent infusion, 750 mg, Intravenous, Q8H, Salazar Amato MD, 750 mg at 09/30/23 0327    insulin aspart (NovoLOG) injection (RAPID ACTING), 1-6 Units, Subcutaneous, Q4H, Jose Carrera MD, 1 Units at 09/30/23 0637    melatonin tablet 1 mg, 1 mg, Oral, At Bedtime PRN, Jose Carrera MD    methadone (DOLOPHINE) tablet 10 mg, 10 mg, Oral, Daily, Jose Carrera MD    methadone (DOLOPHINE) tablet 5 mg, 5 mg, Oral, TID, Jose Carrera MD, 5 mg at 09/30/23 0612    naloxone (NARCAN) injection 0.2 mg, 0.2 mg, Intravenous, Q2 Min PRN **OR** naloxone (NARCAN) injection 0.4 mg, 0.4 mg, Intravenous, Q2 Min PRN **OR** naloxone (NARCAN) injection 0.2 mg, 0.2 mg, Intramuscular, Q2 Min PRN **OR** naloxone (NARCAN) injection 0.4 mg, 0.4 mg, Intramuscular, Q2 Min PRN, Salazar Amato MD    ondansetron (ZOFRAN ODT) ODT tab 4 mg, 4 mg, Oral, Q6H PRN **OR** ondansetron (ZOFRAN) injection 4 mg, 4 mg, Intravenous, Q6H PRN, Jose Carrera MD    Patient is already receiving anticoagulation with heparin, enoxaparin (LOVENOX), warfarin (COUMADIN)  or other anticoagulant medication, ,  Does not apply, Continuous PRN, Jose Carrera MD    polyethylene glycol (MIRALAX) Packet 17 g, 17 g, Oral, Daily PRN, Jose Carrera MD    senna-docusate (SENOKOT-S/PERICOLACE) 8.6-50 MG per tablet 1 tablet, 1 tablet, Oral, BID PRN **OR** senna-docusate (SENOKOT-S/PERICOLACE) 8.6-50 MG per tablet 2 tablet, 2 tablet, Oral, BID PRN, Jose Carrera MD    vancomycin (VANCOCIN) 1,250 mg in 0.9% NaCl 250 mL intermittent infusion, 1,250 mg, Intravenous, Q24H, Jose Carrera MD     ALLERGIES:    Allergies   Allergen Reactions    Prednisone Nausea and Vomiting    Morphine Nausea and Vomiting    Morphine [Fumaric Acid] Nausea and Vomiting        SOCIAL HISTORY:   Social History     Socioeconomic History    Marital status:      Spouse name: Not on file    Number of children: Not on file    Years of education: Not on file    Highest education level: Not on file   Occupational History    Not on file   Tobacco Use    Smoking status: Never    Smokeless tobacco: Never   Substance and Sexual Activity    Alcohol use: No    Drug use: No    Sexual activity: Never   Other Topics Concern    Not on file   Social History Narrative    Not on file     Social Determinants of Health     Financial Resource Strain: Not on file   Food Insecurity: Not on file   Transportation Needs: Not on file   Physical Activity: Not on file   Stress: Not on file   Social Connections: Not on file   Interpersonal Safety: Not on file   Housing Stability: Not on file        FAMILY HISTORY:   Family History   Problem Relation Age of Onset    Hypertension Mother     Heart Disease Father         heart attack and cardiomegaly    Diabetes Sister         type 2    Hypertension Sister     Respiratory Sister     Psychotic Disorder Sister         bipolar    Cancer Maternal Grandmother         throat    Cancer Paternal Grandmother         gastric        EXAM:Vitals: /62 (BP Location: Left arm, Patient Position: Sitting,  "Cuff Size: Adult Regular)   Pulse 62   Temp 98.7  F (37.1  C) (Oral)   Resp 16   Ht 1.676 m (5' 6\")   Wt 116.4 kg (256 lb 9.9 oz)   SpO2 95%   BMI 41.42 kg/m    BMI= Body mass index is 41.42 kg/m .    LABS:    WBC   Date Value Ref Range Status   02/22/2008 9.4 4.0 - 11.0 10e9/L Final     WBC Count   Date Value Ref Range Status   09/30/2023 8.5 4.0 - 11.0 10e3/uL Final     HA1C: 7.0 (9/23/23)    INR: 1.91    CRP: 86.37    General appearance: Patient is alert and fully cooperative with history & exam.       Psychiatric: Affect is pleasant & appropriate.  Patient appears motivated to improve health.       Respiratory: Breathing is regular & unlabored while sitting.      HEENT: Hearing is intact to spoken word.  Speech is clear.  No gross evidence of visual impairment that would impact ambulation.       Dermatologic:   Full-thickness ulceration to the plantar lateral aspect of the left heel.  This measures roughly 9 cm in length by 8 cm in width by 2 cm in depth.  There is bone exposed.  No surrounding erythema purulent drainage or malodor noted.     Vascular: DP & PT pulses are not palpable bilaterally but could be from swelling.   significant edema to both legs and varicosities noted.  CFT and skin temperature is normal to both lower extremities.       Neurologic: Lower extremity sensation is diminished to feet.      Musculoskeletal: Patient is normally ambulatory without assistive device or brace. Currently has wheelchair.       ALMA's:  WAVEFORMS: The dorsalis pedis and posterior tibial arteries are largely abnormal. On the right, there is blunted monophasic posterior tibial waveform with more brisk dorsal pedal waveform. On the left, posterior tibial and dorsal pedal waveforms are   markedly blunt and monophasic. Digital waveforms are very depressed as well.    CULTURES:      4+ Proteus mirabilis Abnormal    Susceptibilities done on previous cultures   4+ Morganella morganii Abnormal    Susceptibilities done " on previous cultures   4+ Enterococcus faecalis Abnormal    Susceptibilities done on previous cultures   3+ Enterococcus avium Abnormal       3+ Staphylococcus aureus Abnormal    Susceptibilities done on previous cultures   2+ Normal danny      4+ Pseudomonas aeruginosa Abnormal    Susceptibilities done on previous cultures   4+ Bordetella species Abnormal    Identification is preliminary, confirmation in progress.  Susceptibilities done on previous cultures   3+ Corynebacterium striatum Abnormal         Pathology:    A.  Soft tissue, left heel, excision-  Degenerated fibrous and adipose soft tissue stroma with extensive acute inflammatory change     B.  Bone, left heel, excision-  Nonneoplastic bone and soft tissue with features compatible with acute and chronic osteomyelitis        ASSESSMENT: 78-year-old diabetic female with left heel ulcer and osteomyelitis.     PLAN:  Reviewed patient's chart in epic.  -Dressing changed at bedside to the left foot.  -Currently there is no further surgery planned for the left foot.  -IV antibiotics were recommended per Dr. Win note 2 days ago.  Would recommend an infectious disease for this.  -We will put in a wound nurse order for wound VAC to the left heel given the large tissue loss to this area.  -Will likely need home care or TCU for vac changes.  -paperwork for vac in patients paper chart.  -Discussed with patient that if this becomes infected or does not heal that this could lead to a below the knee amputation.  Patient is not wanting that at this time .  -Patient also getting worked up by vascular for improvement of blood flow to the leg and foot.  -keep foot and dressing dry.   -no weight on the left foot.  Toe touch weight bearing for transfers only.  -Patient to follow up with Dr. Thorne upon discharge from the hospital.   -Will continue to follow from the periphery while inhouse but podiatry is essentially signed off.       Vernell Pollack, LARRY, Podiatry/Foot and  Ankle Surgery    7:28 AM

## 2023-09-30 NOTE — PHARMACY-VANCOMYCIN DOSING SERVICE
Pharmacy Vancomycin Initial Note  Date of Service 2023  Patient's  1945  78 year old, female    Indication: Bone and Joint Infection and Skin and Soft Tissue Infection    Current estimated CrCl = Estimated Creatinine Clearance: 50.9 mL/min (A) (based on SCr of 1.18 mg/dL (H)).    Creatinine for last 3 days  2023:  5:01 AM Creatinine 1.03 mg/dL  2023:  5:58 AM Creatinine 1.11 mg/dL  2023:  5:36 AM Creatinine 1.18 mg/dL    Recent Vancomycin Level(s) for last 3 days  2023:  1:27 PM Vancomycin 10.6 ug/mL      Vancomycin IV Administrations (past 72 hours)                     vancomycin (VANCOCIN) 1,250 mg in sodium chloride 0.9 % 250 mL intermittent infusion (mg) 1,250 mg New Bag 23 1954     1,250 mg New Bag  0232     1,250 mg New Bag 23 0928    vancomycin (VANCOCIN) 1,250 mg in sodium chloride 0.9 % 250 mL intermittent infusion (mg) 1,250 mg New Bag 23 1423                    Nephrotoxins and other renal medications (From now, onward)      Start     Dose/Rate Route Frequency Ordered Stop    23 1400  vancomycin (VANCOCIN) 1,250 mg in 0.9% NaCl 250 mL intermittent infusion        Note to Pharmacy: Interval changed to q18h per pharmacy protocol.    1,250 mg  over 90 Minutes Intravenous EVERY 18 HOURS 23 09  lisinopril (ZESTRIL) tablet 5 mg        Note to Pharmacy: PTA Sig:Take 5 mg by mouth daily      5 mg Oral DAILY 23  torsemide (DEMADEX) tablet 15 mg        Note to Pharmacy: PTA Sig:Take 15 mg by mouth 2 times daily      15 mg Oral 2 TIMES DAILY 23              Contrast Orders - past 72 hours (72h ago, onward)      None            [employer] Prediction of Planned Initial Vancomycin Regimen  Loading dose: N/A  Regimen: 1250 mg IV every 24 hours.  Start time: 20:00 on 2023  Exposure target: AUC24 (range)400-600 mg/L.hr   AUC24,ss: 542 mg/L.hr  Probability of AUC24 > 400: 97  %  Ctrough,ss: 13.4 mg/L  Probability of Ctrough,ss > 20: 0 %  Probability of nephrotoxicity (Lodise COSTA 2009): 9 %          Plan:  Patient is on vancomycin since 9/23/23 ([facility]). Renal function went up and will change current regimen to vancomycin 1250 mg IV q24h.   Vancomycin monitoring method: AUC  Vancomycin therapeutic monitoring goal: 400-600 mg*h/L  Pharmacy will check vancomycin levels as appropriate in 1-3 Days.    Serum creatinine levels will be ordered daily for the first week of therapy and at least twice weekly for subsequent weeks.      Wade Bennett RP

## 2023-09-30 NOTE — PLAN OF CARE
Goal Outcome Evaluation: reviewed with patient   DATE & TIME: 9/30/2023 days     Cognitive Concerns/ Orientation :  alert and oriented x 4   BEHAVIOR & AGGRESSION TOOL COLOR:  green   CIWA SCORE:  na    ABNL VS/O2:  VSS RA   MOBILITY:  up with assistance of 2 to only be toe touch to the left foot   PAIN MANAGMENT:  on scheduled methadone states it keeps her pain at a 6   DIET:  Mod Cho and ate fair for lunch NPO after MN   BOWEL/BLADDER:  using purewick.   ABNL LAB/BG:  , 131 INR 1.91 creat 1.15 hgb 9.2  DRAIN/DEVICES:  pure wick and heparin drip at 1200 units per hour   TELEMETRY RHYTHM:  na   SKIN:  multiple areas of scabs and open areas on bilateral legs and feet   TESTS/PROCEDURES:  US LE arterial, US ALMA doppler   D/C DATE:  uncertain   Discharge Barriers:   OTHER IMPORTANT INFO:  pt awaiting input from vascular surgery. Pt weepy today concerned about possible amputation of left foot. Pt able to have a late lunch. Pt was NPO waiting for input from vascular surgery. WOC to place wound vac not available until Monday.

## 2023-09-30 NOTE — CONSULTS
[facility] [place]    Infectious Disease Consultation     Date of Admission:  9/29/2023  Date of Consult (When I saw the patient): 09/30/23    Assessment & Plan   Linda Loredo is a 78 year old who was admitted on 9/29/2023.     Impression: 1 78-year-old female, underlying venous stasis, probable peripheral vascular disease and a chronic left heel wound, obvious ongoing infection and clinically obvious osteomyelitis now proven by biopsy.  Wound and bone culture growing numerous organisms, doubt all these are involved in the osteo but all coming from the bone culture, almost no chance of salvage of limb long-term certain to have a long-term wound regardless of approach  2 probable peripheral vascular disease  3 chronic venous stasis  4 MRSA colonization among the wound culture as well  5 mild chronic kidney disease    REC 1 discussed in detail with patient and with daughter on the phone, no likely cure of this type of chronic osteo of the heel even with nonweightbearing and prolonged antibiotics, likely headed towards a BKA if our goal is cure.  2 no reason for nonformulary imipenem, however by the available micro carbapenem is reasonable go to meropenem, also Vanco for now quite undesirable long-term and again were not likely  to cure this is a limiting toxicities has to be part of the plan long-term.  3 possible approach here would be some IV antibiotics then long-term oral suppression, this plan is complicated by the microbiology which is quite complicated and likely to be difficult to suppress even.        Demarco Gonzalez MD    Reason for Consult   Reason for consult: I was asked to evaluate this patient for left foot osteomyelitis.    Primary Care Physician   Ish Brown    Chief Complaint   Left foot pain    History is obtained from the patient and medical records    History of Present Illness   Linda Loredo is a 78 year old female who presents with  transfer from [facility] due to a nonhealing left foot wound with infection.  Patient has had this wound for some time including prior wound VAC, several courses of antibiotics.  Now presents with worsening including some leg cellulitis.  No major clinical sepsis but slight amount of that and blood cultures negative.  Culture of the wound and actual bone biopsy growing numerous organisms including MRSA, Pseudomonas somewhat resistant gram-negative's etc.  Vascular work-up underway.    Past Medical History   I have reviewed this patient's medical history and updated it with pertinent information if needed.   Past Medical History:   Diagnosis Date    Depressive disorder, not elsewhere classified     Herpes zoster without mention of complication     Hypercalcemia 2/24/2007    Mild intermittent asthma     Other urinary incontinence     Type II or unspecified type diabetes mellitus with renal manifestations, uncontrolled(250.42) (H)     Type II or unspecified type diabetes mellitus without mention of complication, not stated as uncontrolled     Unspecified essential hypertension        Past Surgical History   I have reviewed this patient's surgical history and updated it with pertinent information if needed.  Past Surgical History:   Procedure Laterality Date    CHOLECYSTECTOMY      INCISION AND DRAINAGE FOOT, COMBINED Left 9/26/2023    Procedure: Surgical debridement of all nonviable skin and soft tissue and bone left foot;  Surgeon: Nolberto Thorne DPM;  Location: [facility] OR    ligation of fallopian tube      OPEN REDUCTION INTERNAL FIXATION ANKLE  10/13/11    left    pinning of left 2nd toe         Prior to Admission Medications   Prior to Admission Medications   Prescriptions Last Dose Informant Patient Reported? Taking?   ONE TOUCH TEST STRIPS TEST   VI  Self No No   Sig: use as directed   Patient taking differently: 1 strip by In Vitro route daily ACHS   OZEMPIC, 0.25 OR 0.5 MG/DOSE, 2 MG/3ML pen  Self Yes No   Sig:  Inject 0.5 mg Subcutaneous once a week On Monday evenings   acetaminophen (TYLENOL) 325 MG tablet  Self No No   Sig: Take 2 tablets (650 mg) by mouth every 6 hours as needed for mild pain or other (and adjunct with moderate or severe pain or per patient request)   insulin degludec (TRESIBA) 200 UNIT/ML pen  Self Yes No   Sig: Inject 35 Units Subcutaneous At Bedtime Around 11-11:30 pm   lisinopril (ZESTRIL) 5 MG tablet  Self Yes No   Sig: Take 5 mg by mouth daily   metFORMIN (GLUCOPHAGE XR) 500 MG 24 hr tablet  Self Yes No   Sig: Take 2,000 mg by mouth daily (with dinner)   methadone (DOLOPHINE) 10 MG tablet  Self No No   Sig: Take 1 tablet (10 mg) by mouth daily At 1500.  Take in addition to the 5 mg three times daily dose   methadone (DOLOPHINE) 5 MG tablet  Self No No   Sig: Take 1 tablet (5 mg) by mouth 3 times daily At 0800, 1200 and 2100.  Take in addition to the 10 mg tablet at 1500.   naloxone (NARCAN) 4 MG/0.1ML nasal spray  Self No No   Sig: Spray 1 spray (4 mg) into one nostril alternating nostrils as needed for opioid reversal every 2-3 minutes until assistance arrives   oxyCODONE-acetaminophen (PERCOCET) 5-325 MG tablet  Self Yes No   Sig: Take 1-2 tablets by mouth every 6 hours as needed (leg pain)   simvastatin (ZOCOR) 20 MG tablet  Self Yes No   Sig: Take 20 mg by mouth At Bedtime   torsemide (DEMADEX) 10 MG tablet  Self Yes No   Sig: Take 15 mg by mouth 2 times daily   vitamin C (ASCORBIC ACID) 500 MG tablet   Yes No   Sig: Take 500 mg by mouth daily   warfarin ANTICOAGULANT (COUMADIN) 2.5 MG tablet  Self Yes No   Si - 3.75 mg  22 - 3.75 mg  23 - 2.5 mg  24 - 3.75 mg  25 - 2.5 mg  26 - 3.75 mg  27 - 2.5 mg  28 - 3.75 mg      Facility-Administered Medications: None     Allergies   Allergies   Allergen Reactions    Prednisone Nausea and Vomiting    Morphine Nausea and Vomiting    Morphine [Fumaric Acid] Nausea and Vomiting       Immunization History   Immunization History   Administered Date(s)  Administered    COVID-19 Monovalent 18+ (Moderna) 03/11/2021, 04/08/2021, 12/08/2021    FLU 6-35 months 08/31/2011    Flu 65+ Years 10/23/2017, 09/19/2018, 09/26/2019    Influenza (High Dose) 3 valent vaccine 10/02/2013, 08/29/2014, 08/31/2015, 09/14/2016    Influenza (IIV3) PF 11/01/2004, 11/03/2005, 10/25/2006, 10/10/2007, 11/12/2007, 10/28/2008, 10/26/2009, 09/16/2010, 08/31/2011, 10/01/2011, 09/21/2012, 10/02/2013    Influenza Vaccine 65+ (FLUAD) 10/04/2021    Influenza Vaccine 65+ (Fluzone HD) 10/16/2020    Mantoux Tuberculin Skin Test 06/07/2022, 06/16/2022    Pneumo Conj 13-V (2010&after) 10/21/2015    Pneumococcal 23 valent 11/06/2006, 10/13/2011    TDAP (Adacel,Boostrix) 10/13/2011    TDAP Vaccine (Adacel) 01/24/2007    Td (Adult), Adsorbed 1945    Tdap (Adult) Unspecified Formulation 1945       Social History   I have reviewed this patient's social history and updated it with pertinent information if needed. Linda Loredo  reports that she has never smoked. She has never used smokeless tobacco. She reports that she does not drink alcohol and does not use drugs.    Family History   I have reviewed this patient's family history and updated it with pertinent information if needed.   Family History   Problem Relation Age of Onset    Hypertension Mother     Heart Disease Father         heart attack and cardiomegaly    Diabetes Sister         type 2    Hypertension Sister     Respiratory Sister     Psychotic Disorder Sister         bipolar    Cancer Maternal Grandmother         throat    Cancer Paternal Grandmother         gastric       Review of Systems   The 10 point Review of Systems is negative septa looks quite sad and anxious about the situation with her foot.  Foot pain is relatively controlled no major fevers chills or    Physical Exam   Temp: 97.5  F (36.4  C) Temp src: Oral BP: 128/66 Pulse: 82   Resp: 16 SpO2: 93 % O2 Device: None (Room air)    Vital Signs with Ranges  Temp:   [97.5  F (36.4  C)-98.7  F (37.1  C)] 97.5  F (36.4  C)  Pulse:  [62-85] 82  Resp:  [16-18] 16  BP: ()/(40-66) 128/66  SpO2:  [93 %-99 %] 93 %  256 lbs 9.85 oz  Body mass index is 41.42 kg/m .    GENERAL APPEARANCE:  awake  EYES: Eyes grossly normal to inspection  NECK: no adenopathy  RESP: lungs clear   CV: regular rates and rhythm  LYMPHATICS: normal ant/post cervical and supraclavicular nodes  ABDOMEN: soft, nontender  MS: extremities normal foot pictures seen, currently wrapped  SKIN: no suspicious lesions or rashes significant stasis edema bilaterally may be slight leg cellulitis        Data   All laboratory and imaging data in the past 24 hours reviewed  No results for input(s): CULT in the last 168 hours.  No lab results found.    Invalid input(s): UC       All cultures:  Recent Labs   Lab 09/26/23  1658 09/26/23  1657 09/25/23  1620 09/25/23  1540 09/23/23  1302   CULTURE 4+ Proteus mirabilis*  4+ Morganella morganii*  4+ Enterococcus faecalis*  3+ Enterococcus avium*  3+ Staphylococcus aureus*  2+ Normal danny  4+ Pseudomonas aeruginosa*  4+ Bordetella species*  3+ Corynebacterium striatum*  4+ Bacteroides fragilis*  4+ Mixed Aerobic and Anaerobic danny Culture in progress  2+ Pseudomonas aeruginosa*  1+ Morganella morganii*  1+ Escherichia coli*  2+ Enterococcus faecalis*  2+ Enterococcus faecalis*  1+ Staphylococcus aureus MRSA*  1+ Proteus mirabilis*  2+ Bordetella species*  1+ Proteus mirabilis*  1+ Normal danny  2+ Bacteroides fragilis*  2+ Mixed Aerobic and Anaerobic danny No growth after 4 days No growth after 4 days <10,000 CFU/mL Enterococcus faecalis*  <10,000 CFU/mL Staphylococcus aureus MRSA*  <10,000 CFU/mL Escherichia coli*      Blood culture:  Results for orders placed or performed during the hospital encounter of 09/22/23   Blood Culture Arm, Right    Specimen: Arm, Right; Blood   Result Value Ref Range    Culture No growth after 4 days    Blood Culture Arm,  Right    Specimen: Arm, Right; Blood   Result Value Ref Range    Culture No growth after 4 days    Results for orders placed or performed during the hospital encounter of 05/26/23   Blood Culture Peripheral Blood    Specimen: Peripheral Blood   Result Value Ref Range    Culture No Growth    Blood Culture Arm, Right    Specimen: Arm, Right; Blood   Result Value Ref Range    Culture No Growth       Urine culture:  Results for orders placed or performed during the hospital encounter of 09/22/23   Urine Culture    Specimen: Urine, Catheter   Result Value Ref Range    Culture <10,000 CFU/mL Enterococcus faecalis (A)     Culture <10,000 CFU/mL Staphylococcus aureus MRSA (A)     Culture <10,000 CFU/mL Escherichia coli (A)        Susceptibility    Enterococcus faecalis - ENDY     Ampicillin <=2 Susceptible ug/mL     Vancomycin 1 Susceptible ug/mL     Nitrofurantoin <=16 Susceptible ug/mL    Escherichia coli - ENDY     Ampicillin 4 Susceptible ug/mL     Ampicillin/ Sulbactam <=2 Susceptible ug/mL     Piperacillin/Tazobactam <=4 Susceptible ug/mL     Cefazolin* <=4 Susceptible ug/mL      * Cefazolin ENDY breakpoints are for the treatment of uncomplicated urinary tract infections. For the treatment of systemic infections, please contact the laboratory for additional testing.     Cefoxitin <=4 Susceptible ug/mL     Ceftazidime <=1 Susceptible ug/mL     Ceftriaxone <=1 Susceptible ug/mL     Cefepime <=1 Susceptible ug/mL     Gentamicin <=1 Susceptible ug/mL     Tobramycin <=1 Susceptible ug/mL     Ciprofloxacin <=0.25 Susceptible ug/mL     Levofloxacin <=0.12 Susceptible ug/mL     Nitrofurantoin <=16 Susceptible ug/mL     Trimethoprim/Sulfamethoxazole <=1/19 Susceptible ug/mL    Staphylococcus aureus MRSA - ENDY*     Oxacillin* >=4 Resistant ug/mL      * Oxacillin susceptible isolates are susceptible to cephalosporins (example: cefazolin and cephalexin) and beta lactam combination agents. Oxacillin resistant isolates are resistant  to these agents.     Gentamicin <=0.5 Susceptible ug/mL     Linezolid 2 Susceptible ug/mL     Vancomycin 1 Susceptible ug/mL     Daptomycin 1 Susceptible ug/mL     Tetracycline <=1 Susceptible ug/mL     Doxycycline <=0.5 Susceptible ug/mL     Nitrofurantoin <=16 Susceptible ug/mL     Trimethoprim/Sulfamethoxazole <=0.5/9.5 Susceptible ug/mL     * MRSA requires contact precautions.   Results for orders placed or performed during the hospital encounter of 05/26/23   Urine Culture    Specimen: Urine, Braga Catheter   Result Value Ref Range    Culture >100,000 CFU/mL Enterobacter cloacae complex (A)        Susceptibility    Enterobacter cloacae complex - ENDY     Ampicillin*  Resistant       * Intrinsically Resistant     Ampicillin/ Sulbactam*  Resistant       * Intrinsically Resistant     Piperacillin/Tazobactam <=4 Susceptible ug/mL     Cefazolin* >=64 Resistant ug/mL      * Cefazolin ENDY breakpoints are for the treatment of uncomplicated urinary tract infections. For the treatment of systemic infections, please contact the laboratory for additional testing.  Intrinsically Resistant     Cefoxitin* >=64 Resistant ug/mL      * Intrinsically Resistant     Ceftazidime <=1 Susceptible ug/mL     Ceftriaxone <=1 Susceptible ug/mL     Cefepime <=1 Susceptible ug/mL     Gentamicin <=1 Susceptible ug/mL     Tobramycin <=1 Susceptible ug/mL     Ciprofloxacin <=0.25 Susceptible ug/mL     Levofloxacin <=0.12 Susceptible ug/mL     Nitrofurantoin 32 Susceptible ug/mL     Trimethoprim/Sulfamethoxazole <=1/19 Susceptible ug/mL

## 2023-09-30 NOTE — PROGRESS NOTES
[facility] [place]    Medicine Progress Note - Hospitalist Service    Date of Admission:  9/29/2023    Assessment & Plan      Lindachong Loredo is a 78 year old female admitted on 9/29/2023. She presents as a transfer from [facility] for L heel osteomyelitis    L foot decubitus ulcer status debridement by podiatry on 9/26 with multiple organisms  L heel osteomyelitis  L foot cellulitis  Chronic venous stasis  MRSA positive  -brought in 9/23 after welfare check by police found to have L leg swelling and erythema  -Hx venous stasis ulcers. Presents with L heel decubitus ulcer with osteo.   -Wound with multiple organisms incl pseudomonas, morganella, e faecalis, bordetella, proteus status underwent debridement 9/26 by podiatry.   -Blood cultures done on 9/25/2023 have been negative so far  -ALMA w/ poor flow, podiatry at outside hospital requested transfer where there are vascular services  -On presentation to the floor patient was mildly hypertensive and was given IV fluids with improvement in blood pressure and she is asymptomatic  -Patient is currently continued on vancomycin and imipenem as per prior recommendations and status PICC line placement on 9/27 and infectious disease, podiatry and vascular surgery team has been consulted  - continue methadone 10 mg daily midday and 5 mg TID, dilaudid for breakthrough  -She was seen by infectious disease team and they have seen the patient and given that patient and patient is not ready for amputation at this point in time   -Seen by podiatry and they have signed off and plan for wound VAC on Monday  -Seen by vascular surgery and I discussed with them and imaging has been ordered and as per them patient can be started on heparin drip and hold Coumadin    Urine culture positive for E faecalis, Staph aureus and E. Coli  -UA done at outside hospital on 9/23 showed above and patient is currently on broad-spectrum antibiotics including vancomycin and  imipenem  -She is already on broad-spectrum antibiotics and continue the same    DM II  [PTA degludec 35 units at HS, metformin 2000 mg daily, ozempic]  A1c 7.0 9/23/23. Has been on insulin pump in the past but this was changed to tresiba 8/2023.   -long acting insulin held during hospital stay with surprisingly good blood sugars  -We will continue to hold metformin, Ozempic and long-acting insulin  -We will restart long-acting insulin along with sliding scale insulin and monitor    Atrial fibrillation  HTN  HLD  [PTA- warfarin, simvastatin 20 mg daily, lisinopril 5 mg daily]  -INR today is 1.91 and it has been subtherapeutic since hospitalization and will hold Coumadin for now along with ACE as patient may need contrast exposure and will touch base with vascular team  -We will continue to monitor INR daily and continue with statin    CKD III  -Baseline creatinine 1-1.2.   -I did review his creatinine and it is stable at 1.15  - avoid nephrotoxins  -We will monitor for now    Anemia  -Hgb at 9.3, baseline recent 9-10.   -Hemoglobin today is 9.3 and it has been relatively stable since the patient has been hospitalized    Failure to thrive  As above, called by police for welfare check. ? Safety at home. SW had seen at [facility].   -Social work team has been consulted    [place]  -We will continue home cpap       Diet: NPO for Medical/Clinical Reasons Except for: Meds, Ice Chips    DVT Prophylaxis: Pneumatic Compression Devices  Braga Catheter: Not present  Lines: PRESENT      PICC 09/27/23 Single Lumen Right Basilic antibiotics-Site Assessment: WDL      Cardiac Monitoring: None  Code Status: Full Code      Clinically Significant Risk Factors Present on Admission               # Drug Induced Coagulation Defect: home medication list includes an anticoagulant medication    # Hypertension: Noted on problem list     # DMII: A1C = 7.0 % (Ref range: <5.7 %) within past 6 months    # Severe Obesity: Estimated body mass index is 41.42  "kg/m  as calculated from the following:    Height as of this encounter: 1.676 m (5' 6\").    Weight as of this encounter: 116.4 kg (256 lb 9.9 oz).              Disposition Plan      Expected Discharge Date: 10/01/2023                  Mendez Quinn MD  Hospitalist Service  [facility]  Securely message with jellyfish (more info)  Text page via [facility] [employer] Paging/Directory   ______________________________________________________________________    Interval History     Seen today and denies any fever, chills, nausea or vomiting.  Her daughter was on the phone and she was seen by ID who might have recommended that she may be headed toward BKA but patient was not on board with the same and wants to see what the vascular team has to say.    I did discuss plan of care with the patient and the nurse.    Physical Exam   Vital Signs: Temp: 97.5  F (36.4  C) Temp src: Oral BP: 128/66 Pulse: 82   Resp: 16 SpO2: 93 % O2 Device: None (Room air)    Weight: 256 lbs 9.85 oz        General: Patient appears comfortable and in no acute distress.  HEENT: Head is atraumatic, normocephalic.  Pupils are equal, round and reactive to light.  No scleral icterus. Oral mucosa is moist   Neck: Neck is supple and No Lymphadenopathy   Respiratory: Lungs are clear to auscultation bilaterally with no wheeze or crackles   Cardiovascular: Regular rate , S1 and S2 normal with no murmer or rubs or gallops  Abdomen:   soft , non tender , non distended and bowel sound present   Skin: Ulcer over the foot and has dressing in place and edema over the legs  Neurologic: Higher functions are within normal limits. No obvious defects in speech, language and memory. No facial droop  Musculoskeletal: Normal Range of motion over upper and lower extremities bilaterally   Psychiatric: cooperative        Medical Decision Making     Spent in care of patient is 55 minutes and I reviewed her labs, discussed plan of care with the patient nurse, discussed " with vascular surgery, reviewed note from infectious disease and vascular team    Data     I have personally reviewed the following data over the past 24 hrs:    8.5  \   9.2 (L)   / 142 (L)     137 98 30.5 (H) /  170 (H)   3.6 30 (H) 1.15 (H) \     INR:  1.91 (H) PTT:  N/A   D-dimer:  N/A Fibrinogen:  N/A       Imaging results reviewed over the past 24 hrs:   No results found for this or any previous visit (from the past 24 hour(s)).

## 2023-09-30 NOTE — PROGRESS NOTES
"SPIRITUAL HEALTH SERVICES - Progress Note   [facility] 66  Saw pt Linda ELIE Loredo per patient request.     Patient/Family Understanding of Illness and Goals of Care - Margaret received some \"potentially bad news\" today and wanted to process her feelings and ask for prayer.     Distress and Loss -   Margaret expresses stress over having to \"make a big decision\" regarding her medical needs.  Margaret talked of ongoing grief and distress due to her father's sudden and unexpected death following surgery, which makes the possibility of facing her own surgery feel \"overwhelming.\"  Margaret values her \"freedom and independence\" and expressed \"fear\" of losing either of those in the future.    Strengths, Coping, and Resources -   Margaret is very close to her daughter, Joy, who provides emotional support and advocacy for her.     Meaning, Beliefs, and Spirituality -   Margaret's Rastafarian tamiko and \"individual relationship with Jay\" are very meaningful. She \"knows she's in a good place\" and \"Brian is with me.\"  Pat requested prayer and I provided one based on the needs she expressed.    Plan of Care - Spiritual Health remains available upon request.       Loretta Cat  Chaplain Resident    [facility] routine referrals *[phone]    [facility] available 24/7 for emergent requests/referrals, either by paging the on-call  or by entering an ASAP/STAT consult in Epic (this will also page the on-call ).   "

## 2023-09-30 NOTE — PHARMACY-ADMISSION MEDICATION HISTORY
Admission medication history completed at [facility]. Please see Pharmacy - Admission Medication History note from 09/23/2023.

## 2023-10-01 ENCOUNTER — APPOINTMENT (OUTPATIENT)
Dept: PHYSICAL THERAPY | Facility: CLINIC | Age: 78
DRG: 617 | End: 2023-10-01
Attending: INTERNAL MEDICINE
Payer: COMMERCIAL

## 2023-10-01 LAB
ANION GAP SERPL CALCULATED.3IONS-SCNC: 8 MMOL/L (ref 7–15)
BUN SERPL-MCNC: 27.3 MG/DL (ref 8–23)
CALCIUM SERPL-MCNC: 9.1 MG/DL (ref 8.8–10.2)
CHLORIDE SERPL-SCNC: 99 MMOL/L (ref 98–107)
CREAT SERPL-MCNC: 1.07 MG/DL (ref 0.51–0.95)
DEPRECATED HCO3 PLAS-SCNC: 30 MMOL/L (ref 22–29)
EGFRCR SERPLBLD CKD-EPI 2021: 53 ML/MIN/1.73M2
ERYTHROCYTE [DISTWIDTH] IN BLOOD BY AUTOMATED COUNT: 12.9 % (ref 10–15)
GLUCOSE BLDC GLUCOMTR-MCNC: 135 MG/DL (ref 70–99)
GLUCOSE BLDC GLUCOMTR-MCNC: 144 MG/DL (ref 70–99)
GLUCOSE BLDC GLUCOMTR-MCNC: 156 MG/DL (ref 70–99)
GLUCOSE BLDC GLUCOMTR-MCNC: 189 MG/DL (ref 70–99)
GLUCOSE BLDC GLUCOMTR-MCNC: 202 MG/DL (ref 70–99)
GLUCOSE SERPL-MCNC: 149 MG/DL (ref 70–99)
HCT VFR BLD AUTO: 27.8 % (ref 35–47)
HGB BLD-MCNC: 9.1 G/DL (ref 11.7–15.7)
INR PPP: 2.14 (ref 0.85–1.15)
MCH RBC QN AUTO: 30.4 PG (ref 26.5–33)
MCHC RBC AUTO-ENTMCNC: 32.7 G/DL (ref 31.5–36.5)
MCV RBC AUTO: 93 FL (ref 78–100)
PLATELET # BLD AUTO: 150 10E3/UL (ref 150–450)
POTASSIUM SERPL-SCNC: 3.6 MMOL/L (ref 3.4–5.3)
RBC # BLD AUTO: 2.99 10E6/UL (ref 3.8–5.2)
SODIUM SERPL-SCNC: 137 MMOL/L (ref 135–145)
UFH PPP CHRO-ACNC: 0.53 IU/ML
UFH PPP CHRO-ACNC: <0.1 IU/ML
VANCOMYCIN SERPL-MCNC: 22.3 UG/ML
WBC # BLD AUTO: 6.8 10E3/UL (ref 4–11)

## 2023-10-01 PROCEDURE — 97110 THERAPEUTIC EXERCISES: CPT | Mod: GP

## 2023-10-01 PROCEDURE — 85027 COMPLETE CBC AUTOMATED: CPT | Performed by: HOSPITALIST

## 2023-10-01 PROCEDURE — 120N000001 HC R&B MED SURG/OB

## 2023-10-01 PROCEDURE — 80048 BASIC METABOLIC PNL TOTAL CA: CPT | Performed by: HOSPITALIST

## 2023-10-01 PROCEDURE — 99232 SBSQ HOSP IP/OBS MODERATE 35: CPT | Performed by: INTERNAL MEDICINE

## 2023-10-01 PROCEDURE — 85610 PROTHROMBIN TIME: CPT | Performed by: INTERNAL MEDICINE

## 2023-10-01 PROCEDURE — 99233 SBSQ HOSP IP/OBS HIGH 50: CPT | Performed by: HOSPITALIST

## 2023-10-01 PROCEDURE — 250N000011 HC RX IP 250 OP 636: Mod: JZ | Performed by: HOSPITALIST

## 2023-10-01 PROCEDURE — 85520 HEPARIN ASSAY: CPT | Performed by: HOSPITALIST

## 2023-10-01 PROCEDURE — 80202 ASSAY OF VANCOMYCIN: CPT | Performed by: HOSPITALIST

## 2023-10-01 PROCEDURE — 250N000011 HC RX IP 250 OP 636: Performed by: INTERNAL MEDICINE

## 2023-10-01 PROCEDURE — 250N000013 HC RX MED GY IP 250 OP 250 PS 637: Performed by: INTERNAL MEDICINE

## 2023-10-01 PROCEDURE — 97161 PT EVAL LOW COMPLEX 20 MIN: CPT | Mod: GP

## 2023-10-01 PROCEDURE — 250N000013 HC RX MED GY IP 250 OP 250 PS 637: Performed by: HOSPITALIST

## 2023-10-01 PROCEDURE — 258N000003 HC RX IP 258 OP 636: Performed by: INTERNAL MEDICINE

## 2023-10-01 RX ADMIN — MEROPENEM 500 MG: 500 INJECTION, POWDER, FOR SOLUTION INTRAVENOUS at 17:15

## 2023-10-01 RX ADMIN — INSULIN ASPART 2 UNITS: 100 INJECTION, SOLUTION INTRAVENOUS; SUBCUTANEOUS at 17:17

## 2023-10-01 RX ADMIN — METHADONE HYDROCHLORIDE 5 MG: 5 TABLET ORAL at 11:00

## 2023-10-01 RX ADMIN — METHADONE HYDROCHLORIDE 10 MG: 10 TABLET ORAL at 15:18

## 2023-10-01 RX ADMIN — HEPARIN SODIUM 1100 UNITS/HR: 10000 INJECTION, SOLUTION INTRAVENOUS at 20:51

## 2023-10-01 RX ADMIN — ASPIRIN 81 MG: 81 TABLET, COATED ORAL at 11:00

## 2023-10-01 RX ADMIN — MEROPENEM 500 MG: 500 INJECTION, POWDER, FOR SOLUTION INTRAVENOUS at 11:07

## 2023-10-01 RX ADMIN — INSULIN DEGLUDEC 15 UNITS: 100 INJECTION, SOLUTION SUBCUTANEOUS at 21:59

## 2023-10-01 RX ADMIN — MEROPENEM 500 MG: 500 INJECTION, POWDER, FOR SOLUTION INTRAVENOUS at 05:18

## 2023-10-01 RX ADMIN — VANCOMYCIN HYDROCHLORIDE 1250 MG: 10 INJECTION, POWDER, LYOPHILIZED, FOR SOLUTION INTRAVENOUS at 20:21

## 2023-10-01 RX ADMIN — MEROPENEM 500 MG: 500 INJECTION, POWDER, FOR SOLUTION INTRAVENOUS at 23:03

## 2023-10-01 RX ADMIN — HEPARIN SODIUM 800 UNITS/HR: 10000 INJECTION, SOLUTION INTRAVENOUS at 12:26

## 2023-10-01 RX ADMIN — MEROPENEM 500 MG: 500 INJECTION, POWDER, FOR SOLUTION INTRAVENOUS at 00:20

## 2023-10-01 RX ADMIN — METHADONE HYDROCHLORIDE 5 MG: 5 TABLET ORAL at 20:20

## 2023-10-01 RX ADMIN — HEPARIN SODIUM 1000 UNITS/HR: 10000 INJECTION, SOLUTION INTRAVENOUS at 05:29

## 2023-10-01 RX ADMIN — INSULIN ASPART 1 UNITS: 100 INJECTION, SOLUTION INTRAVENOUS; SUBCUTANEOUS at 11:37

## 2023-10-01 RX ADMIN — METHADONE HYDROCHLORIDE 5 MG: 5 TABLET ORAL at 05:18

## 2023-10-01 RX ADMIN — ATORVASTATIN CALCIUM 40 MG: 40 TABLET, FILM COATED ORAL at 20:20

## 2023-10-01 ASSESSMENT — ACTIVITIES OF DAILY LIVING (ADL)
ADLS_ACUITY_SCORE: 34

## 2023-10-01 NOTE — PROGRESS NOTES
"   10/01/23 1000   Appointment Info   Signing Clinician's Name / Credentials (PT) Amy Shen DPT   Living Environment   People in Home alone   Current Living Arrangements apartment   Home Accessibility no concerns   Transportation Anticipated public transportation   Self-Care   Usual Activity Tolerance moderate   Current Activity Tolerance fair   Equipment Currently Used at Home wheelchair, manual;shower chair;raised toilet seat;grab bar, tub/shower;grab bar, toilet;walker, rolling;cane, straight   Fall history within last six months yes   Number of times patient has fallen within last six months 3   Activity/Exercise/Self-Care Comment Pt typically uses manual WC but does use FWW for shorter distances. Typically IND for ADLs, dtr assists with groceries   General Information   Onset of Illness/Injury or Date of Surgery 09/29/23   Referring Physician Jose Carrera MD   Pertinent History of Current Problem (include personal factors and/or comorbidities that impact the POC) \"Linda Loredo is a 78 year old female admitted on 9/29/2023. She presents as a transfer from [facility] for L heel osteomyelitis\"   Existing Precautions/Restrictions fall;weight bearing   Weight-Bearing Status - LLE toe touch weight-bearing  (also has offloader shoe)   Cognition   Affect/Mental Status (Cognition) WFL   Orientation Status (Cognition) oriented x 4   Follows Commands (Cognition) WFL   Pain Assessment   Patient Currently in Pain   (6/10 constant pain in LEs)   Integumentary/Edema   Integumentary/Edema Comments dressings to BLE CDI   Posture    Posture Forward head position;Protracted shoulders   Range of Motion (ROM)   Range of Motion ROM is WFL   Strength (Manual Muscle Testing)   Strength (Manual Muscle Testing) Able to perform R SLR   Strength Comments able to complete small L SLR ROM 2/2 weakness, edema   Bed Mobility   Comment, (Bed Mobility) SBA sup>sit, increased time and effort to complete with HOB " elevated and use of bed rail   Transfers   Comment, (Transfers) CGA sit>stand to FWW, offloader shoe donned   Gait/Stairs (Locomotion)   Comment, (Gait/Stairs) Pt amb 3 ft with CGA at FWW, offloader shoe donned, question how much weight pt is truly bearing throughout LLE despite cues for TTWB   Balance   Balance Comments falls risk, requires use of FWW and phys assist for safe upright mobility   Sensory Examination   Sensory Perception Comments BLE neuropathy, reports light touch absent in B toes   Clinical Impression   Criteria for Skilled Therapeutic Intervention Yes, treatment indicated   PT Diagnosis (PT) impaired IND with functional mobility   Influenced by the following impairments impaired functional strength, balance, activity tolerance, WB status   Functional limitations due to impairments impaired bed mobility, transfers, ambulation   Clinical Presentation (PT Evaluation Complexity) Stable/Uncomplicated   Clinical Presentation Rationale Based on current presentation, PMH, social support   Clinical Decision Making (Complexity) low complexity   Planned Therapy Interventions (PT) balance training;bed mobility training;gait training;home exercise program;neuromuscular re-education;ROM (range of motion);strengthening;transfer training;progressive activity/exercise   Risk & Benefits of therapy have been explained evaluation/treatment results reviewed;care plan/treatment goals reviewed;risks/benefits reviewed;current/potential barriers reviewed;participants voiced agreement with care plan;participants included;patient   PT Total Evaluation Time   PT Eval, Low Complexity Minutes (38406) 10   Physical Therapy Goals   PT Frequency 5x/week   PT Predicted Duration/Target Date for Goal Attainment 10/08/23   PT Goals Bed Mobility;Transfers;Gait   PT: Bed Mobility Supervision/stand-by assist;Supine to/from sit;Within precautions   PT: Transfers Supervision/stand-by assist;Sit to/from stand;Bed to/from chair;Assistive  device   PT: Gait 5 feet;Within precautions;Supervision/stand-by assist;Assistive device;Rolling walker   Therapeutic Procedure/Exercise   Ther. Procedure: strength, endurance, ROM, flexibillity Minutes (68054) 8   Symptoms Noted During/After Treatment fatigue;increased pain   Treatment Detail/Skilled Intervention Pt cued for LE therex for ROM, strength and circulation benefits, 10 reps each with VC for form: AP, QS, GS, LAQ, sitting march, SLR. Able to complete full SLR ROM on R, partial on L.   Therapeutic Activity   Therapeutic Activities: dynamic activities to improve functional performance Minutes (14622) 6   Symptoms Noted During/After Treatment Fatigue;Increased pain   Treatment Detail/Skilled Intervention Increased time spent to gather equipment and prepare room for safe mobility. Pt edu on LLE TTWB, has offloader shoe present from stay at [facility]. TotalA to don offloader shoe to LLE, sock to RLE. Cued for safe sit<>Stand procedure and for maintenance of LLE TTWB. Pt edu on importance of mobility during hospitalization, encouraged to mobilize with nursing staff. RN present at end of session, updated regarding pt mobility. Pt remained in chair, alarm armed and needs in reach.   PT Discharge Planning   PT Plan sit<>stand reps, pivot trasnfers with TTWB   PT Discharge Recommendation (DC Rec) Transitional Care Facility   PT Rationale for DC Rec Pt below reported Matt baseline. Mostly limited by LLE TTWB, currently requires Ax1 for safe transfers. Pt would benefit from continued skilled PT at TCU to maximize safety and IND with mobility as well as promote wound healing and adherence to WB precautions.   PT Brief overview of current status Ax1 to pivot with FWW, LLE TTWB   Total Session Time   Timed Code Treatment Minutes 14   Total Session Time (sum of timed and untimed services) 24

## 2023-10-01 NOTE — PLAN OF CARE
Goal Outcome Evaluation:  Summary:  Cellulites/Osteomyelitis of left heel.     DATE & TIME: 9/30/23 night shift 5626-2515    Cognitive Concerns/ Orientation : A&OX4   BEHAVIOR & AGGRESSION TOOL COLOR: Green  CIWA SCORE: NA   ABNL VS/O2: VSS @RA, Soft BP  MOBILITY: Stand and Pivot to BSC  PAIN MANAGMENT: 6/10 on BLE  DIET: NPO at midnight possible surgery  BOWEL/BLADDER: Continent. Purewick in placed  ABNL LAB/BG: , 190. Xa recheck at 0409am. Hep @700 units/hour.   DRAIN/DEVICES: PICC on right upper arm has vanco running. With intermittent of antibiotics   TELEMETRY RHYTHM: NA  SKIN: Multiple wound on BLE and coccyx-meplilex in placed   TESTS/PROCEDURES: Jalen  D/C DAY/GOALS/PLACE: Pending  OTHER IMPORTANT INFO: Pt able to make her needs known.

## 2023-10-01 NOTE — PROGRESS NOTES
[facility]  Infectious Disease Progress Note          Assessment and Plan:   Date of Admission:  9/29/2023  Date of Consult (When I saw the patient): 09/30/23        Assessment & Plan  Linda Loredo is a 78 year old who was admitted on 9/29/2023.      Impression: 1 78-year-old female, underlying venous stasis, probable peripheral vascular disease and a chronic left heel wound, obvious ongoing infection and clinically obvious osteomyelitis now proven by biopsy.  Wound and bone culture growing numerous organisms, doubt all these are involved in the osteo but all coming from the bone culture, almost no chance of salvage of limb long-term certain to have a long-term wound regardless of approach  2 probable peripheral vascular disease  3 chronic venous stasis  4 MRSA colonization among the wound culture as well  5 mild chronic kidney disease     REC 1 discussed in detail with patient and with daughter on the phone, no likely cure of this type of chronic osteo of the heel even with nonweightbearing and prolonged antibiotics, likely headed towards a BKA if our goal is cure.  2 no reason for nonformulary imipenem, however by the available micro carbapenem is reasonable go to meropenem, also Vanco for now quite undesirable long-term and again were not likely  to cure this is a limiting toxicities has to be part of the plan long-term.  3 possible approach here would be some IV antibiotics then long-term oral suppression, this plan is complicated by the microbiology which is quite complicated and likely to be difficult to suppress even.  4 clinically stable micro same pain OK, vascular BAIG pending              Interval History:     no new complaints and doing well; no cp, sob, n/v/d, or abd pain. Micro same pain OK vascular noted              Medications:      aspirin  81 mg Oral Daily    atorvastatin  40 mg Oral QPM    insulin aspart  1-7 Units Subcutaneous TID AC    insulin aspart  1-5  "Units Subcutaneous At Bedtime    insulin degludec  15 Units Subcutaneous At Bedtime    meropenem  500 mg Intravenous Q6H    methadone  10 mg Oral Daily    methadone  5 mg Oral TID    sodium chloride (PF)  10-40 mL Intracatheter Q8H    vancomycin  1,250 mg Intravenous Q24H                  Physical Exam:   Blood pressure 126/70, pulse 86, temperature 97.3  F (36.3  C), temperature source Oral, resp. rate 18, height 1.676 m (5' 6\"), weight 116.4 kg (256 lb 9.9 oz), SpO2 95 %.  Wt Readings from Last 2 Encounters:   09/29/23 116.4 kg (256 lb 9.9 oz)   09/26/23 120.2 kg (265 lb)     Vital Signs with Ranges  Temp:  [97.3  F (36.3  C)-98  F (36.7  C)] 97.3  F (36.3  C)  Pulse:  [76-89] 86  Resp:  [16-18] 18  BP: ()/(55-70) 126/70  SpO2:  [95 %-96 %] 95 %    Constitutional: Awake, alert, cooperative, no apparent distress     Lungs: Clear to auscultation bilaterally, no crackles or wheezing   Cardiovascular: Regular rate and rhythm, normal S1 and S2, and no murmur noted   Abdomen: Normal bowel sounds, soft, non-distended, non-tender   Skin: No rashes, no cyanosis, same  edema some cellulitis,  foot wrapped   Other:           Data:   All microbiology laboratory data reviewed.  Recent Labs   Lab Test 10/01/23  0604 09/30/23  1620 09/30/23  0614   WBC 6.8 8.2 8.5   HGB 9.1* 9.1* 9.2*   HCT 27.8* 27.3* 29.1*   MCV 93 93 93    148* 142*     Recent Labs   Lab Test 10/01/23  0604 09/30/23  0614 09/29/23  0536   CR 1.07* 1.15* 1.18*     No lab results found.  No lab results found.    Invalid input(s):      "

## 2023-10-01 NOTE — PLAN OF CARE
Goal Outcome Evaluation: reviewed with patient   DATE & TIME: 10/1/2023 days     Cognitive Concerns/ Orientation :  alert and oriented x 4    BEHAVIOR & AGGRESSION TOOL COLOR:  green   CIWA SCORE:  na    ABNL VS/O2:  VSS RA  MOBILITY:  up with assistance of 1 and walker and gaitbelt   PAIN MANAGMENT: states her scheduled methadone is keeping her pain at a 6   DIET:  Mod Cho ate well   BOWEL/BLADDER:  using the purewick and no stools this shift   ABNL LAB/BG:  Creat 1.07 , 144 hgb 9.1 INR 2.14 Hep X A was .53 and heparin drip held for 60 mins and recheck 1830.  DRAIN/DEVICES:  Purewick Heparin gtt and PICC for IV antibiotics   TELEMETRY RHYTHM:  na   SKIN:  wounds to bilateral legs and left heel, blisters to hands   TESTS/PROCEDURES:  possible angiogram   D/C DATE:  uncertain   Discharge Barriers:  needs to have wound vac applied   OTHER IMPORTANT INFO:  Pt was up in the chair for short period of time. Tolerated well. VSS RA. Wound was redressed and waiting for WOC to place wound vac tomorrow. Other wounds were redressed.

## 2023-10-01 NOTE — PROGRESS NOTES
[facility]    Medicine Progress Note - Hospitalist Service    Date of Admission:  9/29/2023    Assessment & Plan      Linda ELIE Loredo is a 78 year old female admitted on 9/29/2023. She presents as a transfer from [facility] for L heel osteomyelitis    L foot decubitus ulcer status debridement by podiatry on 9/26 with multiple organisms  L heel osteomyelitis  L foot cellulitis  Chronic venous stasis  MRSA positive  -brought in 9/23 after welfare check by police found to have L leg swelling and erythema  -Hx venous stasis ulcers. Presents with L heel decubitus ulcer with osteo.   -Wound with multiple organisms incl pseudomonas, morganella, e faecalis, bordetella, proteus status underwent debridement 9/26 by podiatry.   -Blood cultures done on 9/25/2023 have been negative so far  -ALMA w/ poor flow, podiatry at outside hospital requested transfer where there are vascular services  -On presentation to the floor patient was mildly hypertensive and was given IV fluids with improvement in blood pressure and she is asymptomatic  -Patient is currently continued on vancomycin and imipenem as per prior recommendations and status PICC line placement on 9/27 and infectious disease, podiatry and vascular surgery team has been consulted  - continue methadone 10 mg daily midday and 5 mg TID, dilaudid for breakthrough  -She was seen by infectious disease team and they have seen the patient and given that patient and patient is not ready for amputation at this point in time   -Seen by podiatry and they have signed off and plan for wound VAC on Monday  -Seen by vascular surgery and and they reviewed her ALMA and arterial duplex and as per them she likely has falsely elevated ALMA in setting of chronic diabetes and diffuse atherosclerotic disease and they will discuss with attending for potential angiogram next week and continue with heparin drip for now and hold Coumadin  -We will continue with current  drip and I did start aspirin and statin as per recommendation from vascular surgery team    Urine culture positive for E faecalis, Staph aureus and E. Coli  -UA done at outside hospital on 9/23 showed above and patient is currently on broad-spectrum antibiotics including vancomycin and imipenem  -She is already on broad-spectrum antibiotics and continue the same    DM II  [PTA degludec 35 units at HS, metformin 2000 mg daily, ozempic]  A1c 7.0 9/23/23. Has been on insulin pump in the past but this was changed to tresiba 8/2023.   -We will continue to hold metformin, Ozempic   -We will continue with  long-acting insulin at reduced dose along with sliding scale insulin and monitor her sugar this morning is stable    Atrial fibrillation  HTN  HLD  [PTA- warfarin, simvastatin 20 mg daily, lisinopril 5 mg daily]  -Continue with statin, continue to hold warfarin, continue with heparin drip as per vascular surgery and continue to monitor    CKD III  -Baseline creatinine 1-1.2.   -I did review his creatinine and it is stable at 1.07  - avoid nephrotoxins  -We will monitor for now    Anemia  -Hgb at 9.3, baseline recent 9-10.   -Hemoglobin today is 9.1 and it has been relatively stable since the patient has been hospitalized    Failure to thrive  As above, called by police for welfare check. ? Safety at home. SW [facility].   -Social work team has been consulted    [place]  -We will continue home cpap       Diet: NPO per Anesthesia Guidelines for Procedure/Surgery Except for: Meds    DVT Prophylaxis: Pneumatic Compression Devices  Braga Catheter: Not present  Lines: PRESENT      PICC 09/27/23 Single Lumen Right Basilic antibiotics-Site Assessment: WDL      Cardiac Monitoring: None  Code Status: Full Code      Clinically Significant Risk Factors                 # Coagulation Defect: INR = 2.14 (Ref range: 0.85 - 1.15) and/or PTT = N/A, will monitor for bleeding      # Hypertension: Noted on problem list       # DMII: A1C =  "7.0 % (Ref range: <5.7 %) within past 6 months  , PRESENT ON ADMISSION    # Severe Obesity: Estimated body mass index is 41.42 kg/m  as calculated from the following:    Height as of this encounter: 1.676 m (5' 6\").    Weight as of this encounter: 116.4 kg (256 lb 9.9 oz)., PRESENT ON ADMISSION            Disposition Plan      Expected Discharge Date: 10/02/2023                  Mendez Quinn MD  Hospitalist Service  [facility]  Securely message with Talk[employer] (more info)  Text page via [employer] Paging/Directory     I am off service from tomorrow morning and her care will be taken over by hospital medicine team  ______________________________________________________________________    Interval History     Seen today and denied any fever, nausea, vomiting, chills.  She does not have any shortness of breath or chest discomfort.  Discussed recommendation to the patient from vascular surgery team and questions were answered to satisfaction.  She is tolerating diet.    I did discuss plan of care with the patient and the nurse.    Physical Exam   Vital Signs: Temp: 97.3  F (36.3  C) Temp src: Oral BP: 126/70 Pulse: 86   Resp: 18 SpO2: 95 % O2 Device: None (Room air)    Weight: 256 lbs 9.85 oz        General: Patient appears comfortable and in no acute distress.  Respiratory: Lungs are clear to auscultation bilaterally with no wheeze or crackles   Cardiovascular: Regular rate , S1 and S2 normal with no murmer or rubs or gallops  Abdomen:   soft , non tender , non distended and bowel sound present   Skin: Ulcer over the foot and has dressing in place and edema over the legs  Neurologic: No facial droop  Musculoskeletal: Normal Range of motion over upper and lower extremities bilaterally   Psychiatric: cooperative        Medical Decision Making         Spent in care of patient is 40 minutes and I reviewed her CBC, basic metabolic panel, INR and discussed plan of care with the patient and the nurse and " questions were answered to satisfaction and plan of care was discussed.    Data     I have personally reviewed the following data over the past 24 hrs:    6.8  \   9.1 (L)   / 150     137 99 27.3 (H) /  135 (H)   3.6 30 (H) 1.07 (H) \     INR:  2.14 (H) PTT:  N/A   D-dimer:  N/A Fibrinogen:  N/A       Imaging results reviewed over the past 24 hrs:   Recent Results (from the past 24 hour(s))   US ALMA Doppler No Exercise    Narrative    EXAM: RESTING ANKLE-BRACHIAL INDICES (ABIs)  LOCATION: [facility]  DATE: 9/30/2023    INDICATION: PAD  COMPARISON: 09/30/2023 ultrasound . 09/20/2023 ALMA study.    ALMA FINDINGS:  RIGHT  Brachial: 123   Ankle (PT): 166  Index: 1.35   Ankle (DP): 168  Index: 1.37   Digit: 94  Index: 0.76     LEFT  Brachial: 114   Ankle (PT): 191  Index: 1.55   Ankle (DP): 148  Index: 1.20   Digit: 120  Index: 0.98     The right ALMA at rest is 1.37 . The left ALMA at rest is 1.55 .      WAVEFORMS: The dorsalis pedis and posterior tibial arteries are variably biphasic and triphasic .      Impression    IMPRESSION:  1.  Improved compression of vessels on today's study.  2.  RIGHT LOWER EXTREMITY: ALMA at rest is at the upper limits of normal, which could be seen with vascular disease.    3.  LEFT LOWER EXTREMITY: ALMA at rest is elevated, which could be seen with vascular disease. The ALMA measures lower than on the comparison study.   US Lower Extremity Arterial Duplex Bilateral    Narrative    EXAM: US LOWER EXTREMITY ARTERIAL DUPLEX BILATERAL  LOCATION: [facility]  DATE: 9/30/2023    INDICATION: PAD, left heel ulcer  COMPARISON: 9/28/23  TECHNIQUE: Duplex utilizing 2D gray-scale imaging, Doppler interrogation with color-flow and spectral waveform analysis.    FINDINGS: There is diffuse atherosclerotic disease on the right and left.     RIGHT LOWER EXTREMITY ARTERIAL ASSESSMENT:  Common femoral artery: 96  cm/s, triphasic  Profunda femoris artery: 93  cm/s,  triphasic  SFA (proximal): 80  cm/s, triphasic  SFA (mid): 107  cm/s, triphasic  SFA (distal): 104  cm/s, triphasic  Popliteal artery: 69-81  cm/s, triphasic  Posterior tibial artery:   cm/s, biphasic  Dorsalis pedis artery: 140  cm/s, biphasic    LEFT LOWER EXTREMITY ARTERIAL ASSESSMENT:  Common femoral artery: 129  cm/s, triphasic  Profunda femoris artery: 73  cm/s, triphasic  SFA (proximal): 134  cm/s, triphasic  SFA (mid): 107  cm/s, triphasic  SFA (distal): 100  cm/s, triphasic  Popliteal artery: 114-116  cm/s, triphasic  Posterior tibial artery: 87-90  cm/s, triphasic  Dorsalis pedis artery: 140  cm/s, triphasic      Impression    IMPRESSION:  1.  Diffuse atherosclerotic disease.   2.  Velocities and waveforms as detailed above.

## 2023-10-01 NOTE — PLAN OF CARE
Goal Outcome Evaluation:    Summary:  Cellulites/Osteomyelitis of left heel.     DATE & TIME: 10/1/23  [id number]   Cognitive Concerns/ Orientation : A&OX4   BEHAVIOR & AGGRESSION TOOL COLOR: Green  CIWA SCORE: NA   ABNL VS/O2: VSS @RA, Soft BP  MOBILITY: Stand and Pivot to BSC. Able to reposition in bed with help.   PAIN MANAGMENT: 6/10 on BLE, managed with scheduled methadone.   DIET: NPO order midnight possible unspecified procedure   BOWEL/BLADDER: Continent. Purewick in placed with adequate output.   ABNL LAB/BG: INR 2.14, Hep Xa < 0.10,  recheck at 11am. Hep @1000 units/hour.   DRAIN/DEVICES: PICC on right upper arm SL with intermittent antibiotics   TELEMETRY RHYTHM: NA  SKIN: Multiple wound on BLE and coccyx-meplilex in place  TESTS/PROCEDURES: Jalen  D/C DAY/GOALS/PLACE: Pending  OTHER IMPORTANT INFO: Pt able to make her needs known.

## 2023-10-01 NOTE — PROGRESS NOTES
Vascular surgery progress note:    ABIs and arterial duplex reviewed.  Patient with likely falsely elevated ABIs in the setting of chronic diabetes and diffuse atherosclerotic disease.  Noted to have multiphasic waveforms throughout bilateral lower extremity although appears to have some areas of stenosis in left SFA although with adequate PVRs in left foot.  Considering chronicity and lifestyle limiting contribution of left heel ulcer will consider angiogram in upcoming week to potentially improve inflow versus discuss future options with patient if she were to progress to needing amputation.     -Continue to hold Coumadin and continue heparin infusion  -We will discuss plan for potential angiogram this week with Dr Otto  -Please contact vascular surgery with any change in exam, will follow    D/W Dr Connor Dorman, DO  Fellow

## 2023-10-01 NOTE — PHARMACY-VANCOMYCIN DOSING SERVICE
Pharmacy Vancomycin Note  Date of Service 2023  Patient's  1945   78 year old, female    Indication: Bone and Joint Infection and Skin and Soft Tissue Infection  Day of Therapy: 10 (started  at [facility])  Current vancomycin regimen:  1250 mg IV q24h  Current vancomycin monitoring method: AUC  Current vancomycin therapeutic monitoring goal: 400-600 mg*h/L    Insight[employer] Prediction of Current Vancomycin Regimen  Loading dose: N/A  Regimen: 1250 mg IV every 24 hours.  Start time: 21:50 on 10/01/2023  Exposure target: AUC24 (range)400-600 mg/L.hr   AUC24,ss: 442 mg/L.hr  Probability of AUC24 > 400: 96 %  Ctrough,ss: 12 mg/L  Probability of Ctrough,ss > 20: 0 %  Probability of nephrotoxicity (Lodise COSTA ): 7 %    Current estimated CrCl = Estimated Creatinine Clearance: 56.2 mL/min (A) (based on SCr of 1.07 mg/dL (H)).    Creatinine for last 3 days  2023:  5:36 AM Creatinine 1.18 mg/dL  2023:  6:14 AM Creatinine 1.15 mg/dL  10/1/2023:  6:04 AM Creatinine 1.07 mg/dL    Recent Vancomycin Levels (past 3 days)  10/1/2023:  6:04 AM Vancomycin 22.3 ug/mL    Vancomycin IV Administrations (past 72 hours)                     vancomycin (VANCOCIN) 1,250 mg in 0.9% NaCl 250 mL intermittent infusion (mg) 1,250 mg New Bag 23    vancomycin (VANCOCIN) 1,250 mg in sodium chloride 0.9 % 250 mL intermittent infusion (mg) 1,250 mg New Bag 23     1,250 mg New Bag  023                    Nephrotoxins and other renal medications (From now, onward)      Start     Dose/Rate Route Frequency Ordered Stop    23  vancomycin (VANCOCIN) 1,250 mg in 0.9% NaCl 250 mL intermittent infusion        Note to Pharmacy: Interval changed to q18h per pharmacy protocol.    1,250 mg  over 90 Minutes Intravenous EVERY 24 HOURS 23                 Contrast Orders - past 72 hours (72h ago, onward)      None            Interpretation of levels and current regimen:  Vancomycin level  is reflective of -600    Has serum creatinine changed greater than 50% in last 72 hours: No    Urine output:  good urine output    Renal Function: Stable    Plan:  Continue Current Dose  Vancomycin monitoring method: AUC  Vancomycin therapeutic monitoring goal: 400-600 mg*h/L  Pharmacy will check vancomycin levels as appropriate in 5-7 Days.  Serum creatinine levels will be ordered a minimum of twice weekly.    Arleen Sterling RPH

## 2023-10-02 LAB
ANION GAP SERPL CALCULATED.3IONS-SCNC: 7 MMOL/L (ref 7–15)
BACTERIA BONE ANAEROBE+AEROBE CULT: ABNORMAL
BACTERIA TISS BX CULT: ABNORMAL
BUN SERPL-MCNC: 24.3 MG/DL (ref 8–23)
CALCIUM SERPL-MCNC: 9.3 MG/DL (ref 8.8–10.2)
CHLORIDE SERPL-SCNC: 101 MMOL/L (ref 98–107)
CREAT SERPL-MCNC: 1.02 MG/DL (ref 0.51–0.95)
DEPRECATED HCO3 PLAS-SCNC: 30 MMOL/L (ref 22–29)
EGFRCR SERPLBLD CKD-EPI 2021: 56 ML/MIN/1.73M2
ERYTHROCYTE [DISTWIDTH] IN BLOOD BY AUTOMATED COUNT: 12.8 % (ref 10–15)
GLUCOSE BLDC GLUCOMTR-MCNC: 123 MG/DL (ref 70–99)
GLUCOSE BLDC GLUCOMTR-MCNC: 127 MG/DL (ref 70–99)
GLUCOSE BLDC GLUCOMTR-MCNC: 152 MG/DL (ref 70–99)
GLUCOSE BLDC GLUCOMTR-MCNC: 162 MG/DL (ref 70–99)
GLUCOSE BLDC GLUCOMTR-MCNC: 184 MG/DL (ref 70–99)
GLUCOSE SERPL-MCNC: 153 MG/DL (ref 70–99)
HCT VFR BLD AUTO: 28.3 % (ref 35–47)
HGB BLD-MCNC: 9 G/DL (ref 11.7–15.7)
INR PPP: 2.05 (ref 0.85–1.15)
MCH RBC QN AUTO: 30 PG (ref 26.5–33)
MCHC RBC AUTO-ENTMCNC: 31.8 G/DL (ref 31.5–36.5)
MCV RBC AUTO: 94 FL (ref 78–100)
PLATELET # BLD AUTO: 161 10E3/UL (ref 150–450)
POTASSIUM SERPL-SCNC: 3.8 MMOL/L (ref 3.4–5.3)
RBC # BLD AUTO: 3 10E6/UL (ref 3.8–5.2)
SODIUM SERPL-SCNC: 138 MMOL/L (ref 135–145)
UFH PPP CHRO-ACNC: 0.3 IU/ML
UFH PPP CHRO-ACNC: 0.42 IU/ML
WBC # BLD AUTO: 6 10E3/UL (ref 4–11)

## 2023-10-02 PROCEDURE — 120N000001 HC R&B MED SURG/OB

## 2023-10-02 PROCEDURE — 85520 HEPARIN ASSAY: CPT | Performed by: INTERNAL MEDICINE

## 2023-10-02 PROCEDURE — 80048 BASIC METABOLIC PNL TOTAL CA: CPT | Performed by: HOSPITALIST

## 2023-10-02 PROCEDURE — 99232 SBSQ HOSP IP/OBS MODERATE 35: CPT | Performed by: PHYSICIAN ASSISTANT

## 2023-10-02 PROCEDURE — 258N000003 HC RX IP 258 OP 636: Performed by: SURGERY

## 2023-10-02 PROCEDURE — 250N000013 HC RX MED GY IP 250 OP 250 PS 637: Performed by: HOSPITALIST

## 2023-10-02 PROCEDURE — 250N000013 HC RX MED GY IP 250 OP 250 PS 637: Performed by: INTERNAL MEDICINE

## 2023-10-02 PROCEDURE — 85027 COMPLETE CBC AUTOMATED: CPT | Performed by: HOSPITALIST

## 2023-10-02 PROCEDURE — 85610 PROTHROMBIN TIME: CPT | Performed by: INTERNAL MEDICINE

## 2023-10-02 PROCEDURE — 258N000003 HC RX IP 258 OP 636: Performed by: INTERNAL MEDICINE

## 2023-10-02 PROCEDURE — 250N000011 HC RX IP 250 OP 636: Mod: JZ | Performed by: SURGERY

## 2023-10-02 PROCEDURE — G0463 HOSPITAL OUTPT CLINIC VISIT: HCPCS

## 2023-10-02 PROCEDURE — 250N000011 HC RX IP 250 OP 636: Mod: JZ | Performed by: INTERNAL MEDICINE

## 2023-10-02 PROCEDURE — 250N000011 HC RX IP 250 OP 636: Mod: JZ | Performed by: HOSPITALIST

## 2023-10-02 PROCEDURE — 99233 SBSQ HOSP IP/OBS HIGH 50: CPT | Performed by: INTERNAL MEDICINE

## 2023-10-02 PROCEDURE — 250N000011 HC RX IP 250 OP 636: Performed by: INTERNAL MEDICINE

## 2023-10-02 RX ORDER — FENTANYL CITRATE 0.05 MG/ML
25-50 INJECTION, SOLUTION INTRAMUSCULAR; INTRAVENOUS EVERY 5 MIN PRN
Status: DISCONTINUED | OUTPATIENT
Start: 2023-10-02 | End: 2023-10-03 | Stop reason: HOSPADM

## 2023-10-02 RX ORDER — NALOXONE HYDROCHLORIDE 0.4 MG/ML
0.2 INJECTION, SOLUTION INTRAMUSCULAR; INTRAVENOUS; SUBCUTANEOUS
Status: DISCONTINUED | OUTPATIENT
Start: 2023-10-02 | End: 2023-10-03 | Stop reason: HOSPADM

## 2023-10-02 RX ORDER — NALOXONE HYDROCHLORIDE 0.4 MG/ML
0.4 INJECTION, SOLUTION INTRAMUSCULAR; INTRAVENOUS; SUBCUTANEOUS
Status: DISCONTINUED | OUTPATIENT
Start: 2023-10-02 | End: 2023-10-03 | Stop reason: HOSPADM

## 2023-10-02 RX ORDER — HEPARIN SODIUM 200 [USP'U]/100ML
1 INJECTION, SOLUTION INTRAVENOUS CONTINUOUS PRN
Status: DISCONTINUED | OUTPATIENT
Start: 2023-10-02 | End: 2023-10-03 | Stop reason: HOSPADM

## 2023-10-02 RX ORDER — FLUMAZENIL 0.1 MG/ML
0.2 INJECTION, SOLUTION INTRAVENOUS
Status: DISCONTINUED | OUTPATIENT
Start: 2023-10-02 | End: 2023-10-03 | Stop reason: HOSPADM

## 2023-10-02 RX ADMIN — METHADONE HYDROCHLORIDE 10 MG: 10 TABLET ORAL at 15:02

## 2023-10-02 RX ADMIN — ASPIRIN 81 MG: 81 TABLET, COATED ORAL at 08:53

## 2023-10-02 RX ADMIN — METHADONE HYDROCHLORIDE 5 MG: 5 TABLET ORAL at 11:38

## 2023-10-02 RX ADMIN — MEROPENEM 500 MG: 500 INJECTION, POWDER, FOR SOLUTION INTRAVENOUS at 17:23

## 2023-10-02 RX ADMIN — VANCOMYCIN HYDROCHLORIDE 1250 MG: 10 INJECTION, POWDER, LYOPHILIZED, FOR SOLUTION INTRAVENOUS at 20:15

## 2023-10-02 RX ADMIN — ATORVASTATIN CALCIUM 40 MG: 40 TABLET, FILM COATED ORAL at 20:17

## 2023-10-02 RX ADMIN — PHYTONADIONE 2 MG: 2 INJECTION, EMULSION INTRAMUSCULAR; INTRAVENOUS; SUBCUTANEOUS at 13:21

## 2023-10-02 RX ADMIN — HEPARIN SODIUM 1100 UNITS/HR: 10000 INJECTION, SOLUTION INTRAVENOUS at 02:31

## 2023-10-02 RX ADMIN — MEROPENEM 500 MG: 500 INJECTION, POWDER, FOR SOLUTION INTRAVENOUS at 23:59

## 2023-10-02 RX ADMIN — INSULIN ASPART 1 UNITS: 100 INJECTION, SOLUTION INTRAVENOUS; SUBCUTANEOUS at 13:08

## 2023-10-02 RX ADMIN — METHADONE HYDROCHLORIDE 5 MG: 5 TABLET ORAL at 20:16

## 2023-10-02 RX ADMIN — MEROPENEM 500 MG: 500 INJECTION, POWDER, FOR SOLUTION INTRAVENOUS at 11:38

## 2023-10-02 RX ADMIN — MEROPENEM 500 MG: 500 INJECTION, POWDER, FOR SOLUTION INTRAVENOUS at 05:19

## 2023-10-02 RX ADMIN — METHADONE HYDROCHLORIDE 5 MG: 5 TABLET ORAL at 05:23

## 2023-10-02 ASSESSMENT — ACTIVITIES OF DAILY LIVING (ADL)
DIFFICULTY_COMMUNICATING: NO
ADLS_ACUITY_SCORE: 28
ADLS_ACUITY_SCORE: 28
DOING_ERRANDS_INDEPENDENTLY_DIFFICULTY: OTHER (SEE COMMENTS)
ADLS_ACUITY_SCORE: 28
WALKING_OR_CLIMBING_STAIRS_DIFFICULTY: YES
CHANGE_IN_FUNCTIONAL_STATUS_SINCE_ONSET_OF_CURRENT_ILLNESS/INJURY: NO
TRANSFERRING: 0-->INDEPENDENT
ADLS_ACUITY_SCORE: 34
FALL_HISTORY_WITHIN_LAST_SIX_MONTHS: YES
ADLS_ACUITY_SCORE: 28
ADLS_ACUITY_SCORE: 34
ADLS_ACUITY_SCORE: 28
ADLS_ACUITY_SCORE: 28
TOILETING_ISSUES: NO
ADLS_ACUITY_SCORE: 34
CONCENTRATING,_REMEMBERING_OR_MAKING_DECISIONS_DIFFICULTY: NO
ADLS_ACUITY_SCORE: 34
WALKING_OR_CLIMBING_STAIRS: OTHER (SEE COMMENTS)
DRESSING/BATHING_DIFFICULTY: NO
WEAR_GLASSES_OR_BLIND: YES
ADLS_ACUITY_SCORE: 34
DIFFICULTY_EATING/SWALLOWING: NO
EQUIPMENT_CURRENTLY_USED_AT_HOME: WHEELCHAIR, MANUAL
TRANSFERRING: 0-->INDEPENDENT
ADLS_ACUITY_SCORE: 28

## 2023-10-02 NOTE — PROGRESS NOTES
[facility]    Hospitalist Progress Note    Assessment & Plan   Linda DELGADILLO Awa Loredo is a 78 year old female admitted on 9/29/2023. She presents as a transfer from [facility] for L heel osteomyelitis     L foot decubitus ulcer status debridement by podiatry on 9/26 with multiple organisms  L heel osteomyelitis  L foot cellulitis  Chronic venous stasis  MRSA positive  -brought in 9/23 after welfare check by police found to have L leg swelling and erythema  -Hx venous stasis ulcers. Presents with L heel decubitus ulcer with osteo.   -Wound with multiple organisms incl pseudomonas, morganella, e faecalis, bordetella, proteus status underwent debridement 9/26 by podiatry.   -Blood cultures done on 9/25/2023 have been negative so far  -ALMA w/ poor flow, podiatry at outside hospital requested transfer where there are vascular services  -On presentation to the floor patient was mildly hypertensive and was given IV fluids with improvement in blood pressure and she is asymptomatic  -Patient is currently continued on vancomycin and imipenem as per prior recommendations and status PICC line placement on 9/27 and infectious disease, podiatry and vascular surgery team has been consulted  - continue methadone 10 mg daily midday and 5 mg TID, dilaudid for breakthrough  -She was seen by infectious disease team and they have seen the patient and given that patient and patient is not ready for amputation at this point in time   -Seen by podiatry and they have signed off and plan for wound VAC on Monday  -Seen by vascular surgery and and they reviewed her ALMA and arterial duplex, they are recommending to continue heparin drip and hold warfarin until 10/3 so that they could do angiogram.  -Continue aspirin and statin, currently on heparin drip.  Will transition to warfarin after angiogram completed.     Urine culture positive for E faecalis, Staph aureus and E. Coli  -UA done at outside hospital on 9/23  "showed above and patient is currently on broad-spectrum antibiotics including vancomycin and imipenem  -She is already on broad-spectrum antibiotics and continue the same.     DM II  [PTA degludec 35 units at HS, metformin 2000 mg daily, ozempic]  A1c 7.0 9/23/23. Has been on insulin pump in the past but this was changed to tresiba 8/2023.   -We will continue to hold metformin, Ozempic   -We will continue with  long-acting insulin at reduced dose along with sliding scale insulin and monitor her sugar this morning is stable     Atrial fibrillation  HTN  HLD  [PTA- warfarin, simvastatin 20 mg daily, lisinopril 5 mg daily]  -Continue with statin, currently is on hold, continue heparin drip.     CKD III  -Baseline creatinine 1-1.2.   -Stable, monitor occasionally, poorly possibly obtained creatinine following angiogram.  -Creatinine 1.02 today     Anemia  -Hgb at 9.3, baseline recent 9-10.   -Hemoglobin noted, 9 today.     Failure to thrive  As above, called by police for welfare check. ? Safety at home. SW had [facility].   -Social work team has been consulted     [place]  -We will continue home cpap      DVT Prophylaxis: heparin drip  Code Status: Full Code     52 MINUTES SPENT BY ME on the date of service doing chart review, history, exam, documentation & further activities per the note.  Disposition: Expected discharge in 1 to 2 days depending on timing of angiogram to TCU.  Clinically Significant Risk Factors               # Coagulation Defect: INR = 2.05 (Ref range: 0.85 - 1.15) and/or PTT = N/A, will monitor for bleeding    # Hypertension: Noted on problem list       # DMII: A1C = 7.0 % (Ref range: <5.7 %) within past 6 months, PRESENT ON ADMISSION  # Severe Obesity: Estimated body mass index is 41.42 kg/m  as calculated from the following:    Height as of this encounter: 1.676 m (5' 6\").    Weight as of this encounter: 116.4 kg (256 lb 9.9 oz)., PRESENT ON ADMISSION            Lynda Courtney MD  Text Page   (7am " to 6pm)    Interval History   Patient is resting, pain controlled, she first told me that she is planning to go to rehab today since vascular surgery has decided against the angiogram, later she was told that she might get angiogram tomorrow.  Her INR is 2 today.    -Data reviewed today: I reviewed all new labs and imaging results over the last 24 hours.   Physical Exam     Vital Signs with Ranges  Temp:  [97.3  F (36.3  C)-97.9  F (36.6  C)] 97.6  F (36.4  C)  Pulse:  [80-86] 81  Resp:  [18] 18  BP: (102-126)/(63-70) 102/65  SpO2:  [95 %-99 %] 99 %  I/O last 3 completed shifts:  In: 1120 [P.O.:1120]  Out: 1100 [Urine:1100]    Constitutional: Awake, alert, cooperative, no apparent distress  Respiratory: Clear to auscultation bilaterally, no crackles or wheezing  Cardiovascular: Regular rate and rhythm, normal S1 and S2, and no murmur noted  GI: Normal bowel sounds, soft, non-distended, non-tender  Skin/Integumen: 2+ edema noted bilateral lower extremities, left heel status post surgery.  Neuro : moving all 4 extremities, no focal deficit noted     Medications    heparin 1,100 Units/hr (10/02/23 0231)    - MEDICATION INSTRUCTIONS -        aspirin  81 mg Oral Daily    atorvastatin  40 mg Oral QPM    insulin aspart  1-7 Units Subcutaneous TID AC    insulin aspart  1-5 Units Subcutaneous At Bedtime    insulin degludec  15 Units Subcutaneous At Bedtime    meropenem  500 mg Intravenous Q6H    methadone  10 mg Oral Daily    methadone  5 mg Oral TID    sodium chloride (PF)  10-40 mL Intracatheter Q8H    vancomycin  1,250 mg Intravenous Q24H       Data   Recent Labs   Lab 10/02/23  0653 10/02/23  0154 10/01/23  2119 10/01/23  0802 10/01/23  0604 10/01/23  0412 09/30/23  1658 09/30/23  1620 09/30/23  1115 09/30/23  0614   WBC 6.0  --   --   --  6.8  --   --  8.2  --  8.5   HGB 9.0*  --   --   --  9.1*  --   --  9.1*  --  9.2*   MCV 94  --   --   --  93  --   --  93  --  93     --   --   --  150  --   --  148*  --  142*    INR 2.05*  --   --   --   --  2.14*  --   --   --  1.91*     --   --   --  137  --   --   --   --  137   POTASSIUM 3.8  --   --   --  3.6  --   --   --   --  3.6   CHLORIDE 101  --   --   --  99  --   --   --   --  98   CO2 30*  --   --   --  30*  --   --   --   --  30*   BUN 24.3*  --   --   --  27.3*  --   --   --   --  30.5*   CR 1.02*  --   --   --  1.07*  --   --   --   --  1.15*   ANIONGAP 7  --   --   --  8  --   --   --   --  9   ZAKIA 9.3  --   --   --  9.1  --   --   --   --  9.2   * 184* 189*   < > 149*  --    < >  --    < > 170*    < > = values in this interval not displayed.     Recent Labs   Lab 10/02/23  0653 10/02/23  0154 10/01/23  2119 10/01/23  1656 10/01/23  1116   * 184* 189* 202* 144*       Imaging:   No results found for this or any previous visit (from the past 24 hour(s)).

## 2023-10-02 NOTE — CONSULTS
[facility] Nurse Inpatient Assessment     Consulted for: Vac placement to Left diabetic foot ulcer. Patient found with numerous wounds to BL legs, coccyx, buttock and left IT.    Summary:   1) Left heel ulcer: Infected with osteomyelitis, recent debridement by podiatry 9/26. Found with moderate purulence in wound. Will hold vac for now and treat with BID vashe soaks and reassess later in the week.  2) BL legs: Chronic venous stasis with superficial healing wounds, has been using Triad paste.  3) Buttock/Coccyx: Mixture of friction and moisture, healing with Triad paste  4) Left IT: Unstageable ulcer present on admission, will treat with triad paste.    Patient History (according to provider note(s):      Linda Loredo is a 78 year old female admitted on 9/29/2023. She presents as a transfer from [facility] for L heel osteomyelitis     L foot decubitus ulcer status debridement by podiatry on 9/26 with multiple organisms  L heel osteomyelitis  L foot cellulitis  Chronic venous stasis  MRSA positive  -brought in 9/23 after welfare check by police found to have L leg swelling and erythema  -Hx venous stasis ulcers. Presents with L heel decubitus ulcer with osteo.   -Wound with multiple organisms incl pseudomonas, morganella, e faecalis, bordetella, proteus status underwent debridement 9/26 by podiatry.   -Blood cultures done on 9/25/2023 have been negative so far  -ALMA w/ poor flow, podiatry at outside hospital requested transfer where there are vascular services  -On presentation to the floor patient was mildly hypertensive and was given IV fluids with improvement in blood pressure and she is asymptomatic  -Patient is currently continued on vancomycin and imipenem as per prior recommendations and status PICC line placement on 9/27 and infectious disease, podiatry and vascular surgery team has been consulted  - continue methadone 10 mg daily midday and 5 mg TID, dilaudid for  breakthrough  -She was seen by infectious disease team and they have seen the patient and given that patient and patient is not ready for amputation at this point in time   -Seen by podiatry and they have signed off and plan for wound VAC on Monday  -Seen by vascular surgery and and they reviewed her ALMA and arterial duplex and as per them she likely has falsely elevated ALMA in setting of chronic diabetes and diffuse atherosclerotic disease and they will discuss with attending for potential angiogram next week and continue with heparin drip for now and hold Coumadin  -We will continue with current drip and I did start aspirin and statin as per recommendation from vascular surgery team    Assessment:      Areas visualized during today's visit: Sacrum/coccyx and Lower extremities     Wound location: Left heel    Last photo: 10/2/23    Wound due to: Pressure Injury and Diabetic Ulcer  Wound history/plan of care: Pt admitted with osteomyelitis and had debridement 9/26. She has ALMA of .98 and will have angiogram 10/3. Due to appearance of the wound, odor and osteo present will treat with vashe soaks to start   Wound base: 70 % fibrin and slough, 20 % non-granular tissue 10% bone     Palpation of the wound bed: firm and fluctuance-palpated down to bone     Drainage: moderate     Description of drainage: serosanguinous and purulent     Measurements (length x width x depth, in cm): 5  x 6  x  1.3 cm      Tunneling: N/A     Undermining: N/A  Periwound skin: Dry/scaly and Superficial erosion      Color:  scattered micro bleeding under tissues      Temperature: normal   Odor: none  Pain: moderate, intermittent, stabbing, and sharp  Pain interventions prior to dressing change: slow and gentle cares  and distraction  Treatment goal: Drainage control, Infection control/prevention, and Soften necrotic tissue  STATUS: initial assessment-page sent to vascular to update on holding off on vac.   Supplies ordered: at bedside, supplies  stored on unit, discussed with RN, and discussed with patient     Wound location: Right posterior LE    Last photo: 10/2/23    Wound due to: Venous Ulcer  Wound history/plan of care: Chronic non healing ulcer, chronic edema with diabetes found covered with a mepilex  Wound base: thin white fibrin overlaying red moist nongranular tissue     Palpation of the wound bed: normal      Drainage: scant     Description of drainage: serosanguinous     Measurements (length x width x depth, in cm): 2  x 2  x  0.1 cm      Tunneling: N/A     Undermining: N/A  Periwound skin: Intact, Edematous, and Erythema- blanchable      Color: pink      Temperature: normal   Odor: none  Pain: denies , none  Pain interventions prior to dressing change: N/A  Treatment goal: Heal  and Infection control/prevention  STATUS: initial assessment  Supplies ordered: at bedside      Wound location: Left lateral LE    Last photo: 10/2/23    Wound due to: Venous Ulcer  Wound history/plan of care: Chronic non healing ulcer, chronic edema with diabetes found covered with a mepilex  Wound base: 100 % red non-granular tissue     Palpation of the wound bed: normal      Drainage: scant     Description of drainage: serosanguinous     Measurements (length x width x depth, in cm): 2  x 0.8  x  0.1 cm      Tunneling: N/A     Undermining: N/A  Periwound skin: Dry/scaly, Edematous, and residual Triad paste      Color: normal and consistent with surrounding tissue      Temperature: normal   Odor: none  Pain: denies , none  Pain interventions prior to dressing change: N/A  Treatment goal: Heal   STATUS: initial assessment  Supplies ordered: at bedside        Wound location: Coccyx/buttock    Last photo: 10/2/23    Wound due to: Friction and Incontinence Associated Dermatitis (IAD)  Wound history/plan of care: Patient with moisture trapping and chronic incontinence. Found with saturated mepilex  Wound base: Denuded tissue over coccyx and BL inner buttock with superficial  scabbing and scattered areas of erythema     Palpation of the wound bed: normal      Drainage:  weepy mixed with dry and scaly     Description of drainage: serous     Measurements (length x width x depth, in cm): Coccyx, Left buttock and Right buttock all less than 2 x 2 x 0.01cm area      Tunneling: N/A     Undermining: N/A  Periwound skin: Intact      Color: normal and consistent with surrounding tissue      Temperature: normal   Odor: none  Pain: mild, tender  Pain interventions prior to dressing change: slow and gentle cares   Treatment goal: Heal  and Decrease moisture  STATUS: initial assessment  Supplies ordered: at bedside       Treatment Plan:     Coccyx, buttock and Left IT wound(s): BID and PRN incontinence  1.Cleanse the area with Marisol cleanse and protect and dinah dry wipes/soft cloth.   2. Apply nickel thick layer of Triad paste (#144831) to wound bed and thin layer over reddened areas   **No need to remove all paste after each incontinent episode, remove soiled paste and reapply as needed.  **If complete removal of paste is necessary use baby oil/mineral oil (Located in Pharmacy) and soft wash cloth.   Leave the brief off in bed to let the area dry as much as possible.   Use only one Covidien pad in between mattress and pt. No brief while in bed.    BL lower legs: Every other day and prn  1.Cleanse the area with Marisol cleanse and protect and dinah dry wipes/soft cloth.   2. Apply nickel thick layer of Triad paste (#225139) to wound bed and thin layer over reddened areas   3. Cover with  Border Dressing (#435101)     Left Heel: BID and PRN  1. Cleanse wound with Vashe #([id number]) soaked gauze. Soak gauze and allow to sit in wound bed for 10 minutes then remove.  2. Moisten new gauze or kerlix with vashe, wring out excess so it is damp and gently pack into wound (this will remain in wound as primary dressing)  3. Apply Cavilon No Sting Skin Prep (#129409) to periwound and allow to dry.  4.  Cover wound  with gauze sponge or ABD. Secure with Kerlix and tape  5. Time and date dressing change  *Float heels at all times with pillows or use Prevalon boots if not maintaining positioning.      Orders: Written    RECOMMEND PRIMARY TEAM ORDER: None, at this time  Education provided: plan of care, wound progress, Moisture management, Hygiene, and Off-loading pressure  Discussed plan of care with: Patient, Family, Nurse, and page sent to Dr. Alvarado  WO nurse follow-up plan:  Wednesday to reassess for wound vac  Notify WOC if wound(s) deteriorate.  Nursing to notify the Provider(s) and re-consult the WOC Nurse if new skin concern.    DATA:     Current support surface: Standard  Low air loss (BRISA pump, Isolibrium, Pulsate, skin guard, etc)  Containment of urine/stool: Incontinence Protocol, Incontinent pad in bed, and Purewick external catheter   BMI: Body mass index is 41.42 kg/m .   Active diet order: Orders Placed This Encounter      Moderate Consistent Carb (60 g CHO per Meal) Diet      NPO per Anesthesia Guidelines for Procedure/Surgery Except for: Meds     Output: I/O last 3 completed shifts:  In: 1120 [P.O.:1120]  Out: 1100 [Urine:1100]     Labs:   Recent Labs   Lab 10/02/23  0653   HGB 9.0*   INR 2.05*   WBC 6.0     Pressure injury risk assessment:   Sensory Perception: 4-->no impairment  Moisture: 3-->occasionally moist  Activity: 3-->walks occasionally  Mobility: 2-->very limited  Nutrition: 3-->adequate  Friction and Shear: 2-->potential problem  Suresh Score: 17    Keith Tinsley RN CWOCN  -Securely message with [employer] (more info) - can reach individually by name or search 'WOC Nurse' (Paramjit) to reach all current WOCs on duty.  WO Office Phone: [phone]

## 2023-10-02 NOTE — PLAN OF CARE
Goal Outcome Evaluation: reviewed with patient and daughter   DATE & TIME: 10/2/2023 days     Cognitive Concerns/ Orientation :  alert and oriented x 4   BEHAVIOR & AGGRESSION TOOL COLOR:  green   CIWA SCORE:  na    ABNL VS/O2:  VSS RA   MOBILITY:  up with assistance of 1 and walker not out of bed this shift   PAIN MANAGMENT:  scheduled methadone keeps pain at 6   DIET:  Mod Cho ate well, NPO after MN   BOWEL/BLADDER:  using the purewick and no stools this shift   ABNL LAB/BG:  , 152 hep XA 0.42, INR 2.05 given Vit K to reverse for angio in the am , Hgb 9.0 creat 1.02   DRAIN/DEVICES: PICC line for antibiotics and IV heparin drip pure wick   TELEMETRY RHYTHM:  na   SKIN:  Scattered open areas on legs and scabs and left heel wound. Periarea redness and open areas and WOC saw today   TESTS/PROCEDURES:  Angio of left leg tomorrow at 1300 heprin drip to stop at 0900.   D/C DATE:  uncertain   Discharge Barriers:   OTHER IMPORTANT INFO:  Pt ate fair today. Concerns re possible amputation of left foot. Pt will have angiogram of left leg tomorrow to assess blood flow. Pt not out of bed this shift. Chronic pain and methadone keeps pain at 6.

## 2023-10-02 NOTE — PLAN OF CARE
Goal Outcome Evaluation:  Summary:  Cellulites/Osteomyelitis of left heel.     DATE & TIME: 10/1/23  [id number]   Cognitive Concerns/ Orientation : A&OX4   BEHAVIOR & AGGRESSION TOOL COLOR: Green  CIWA SCORE: NA   ABNL VS/O2: VSS @RA, Soft BP  MOBILITY: Stand and Pivot to BSC. Able to reposition in bed with help. Encouraged to weight shift every 2 hours  PAIN MANAGMENT: 6/10 on BLE, managed with scheduled methadone.   DIET: NPO order midnight possible unspecified procedure   BOWEL/BLADDER: Continent. Purewick in placed with adequate output.   ABNL LAB/BG:  Hep Xa < 0.10,  recheck at 3am. Hep @1100 units/hour.   DRAIN/DEVICES: PICC on right upper arm SL with intermittent antibiotics   TELEMETRY RHYTHM: NA  SKIN: Multiple wound on BLE and coccyx-meplilex in place  TESTS/PROCEDURES: Jalen  D/C DAY/GOALS/PLACE: Pending  OTHER IMPORTANT INFO: Pt able to make her needs known.

## 2023-10-02 NOTE — PROGRESS NOTES
I discussed plans for left lower extremity angiogram with Margaret and her daughter Julieta at the bedside. Intraoperative wound cultures have returned polymicrobial growth of both tissue and bone specimens. She understands cure of osteomyelitis is not possible without major amputation but would still like to attempt limb salvage, even if this means a nonfunctional limb.  Consent for angiogram placed in chart. NPO at midnight. Vit K given today. Repeat INR in AM.    Torey Alvarado MD

## 2023-10-02 NOTE — PROGRESS NOTES
"Vascular surgery progress    No acute issues.  We reviewed her chronic history of bilateral lower extremity wounds and multiple debridements over the last year and a half of her left heel wound.  She has minimal pain due to peripheral neuropathy.  Has not walked further than from couch to bathroom for several months using a walker due to the left heel wound.      /63 (BP Location: Left arm)   Pulse 79   Temp 98  F (36.7  C) (Oral)   Resp 18   Ht 1.676 m (5' 6\")   Wt 116.4 kg (256 lb 9.9 oz)   SpO2 98%   BMI 41.42 kg/m      Sitting comfortable in the, awake, alert, appropriate  Pleasant obese female  Palpable femoral pulses  Bilateral venous wounds with mild lipodermatosclerosis over bilateral shins  Large open wound in the left heel with mild amount of fibrinous debris in the center of the wound, no significant erythema of the foot or ankle  Biphasic left DP, monophasic left PT Doppler signal  Monophasic right DP and PT Doppler signals        Labs reviewed  INR 2.05  No leukocytosis  Hemoglobin 9.0, stable  Platelets 161  Creatinine 1.02, GFR 56    ABIs are noncompressible, toe pressure on the right is 94 with TBI of 0.76.  Toe pressure on the left is 120 with TBI of 0.98.  Triphasic PVR waveforms at the distal AT and PT at the level of the ankle bilaterally.  Lower extremity arterial duplex ultrasound demonstrates multiphasic waveforms throughout the entirety of both legs without focal focal stenosis.    Assessment/plan:  78-year-old female with type II diabetes, A-fib on warfarin, HTN, HLD, chronic L heel decubitus ulcer /sp debridement on 9/26/23 with podiatry. Polymicrobial intra-op wound cultures including multiple bacteria and bone cultures consistent with osteomyelitis.    -Extensive left heel wound, podiatry is ordered a wound VAC to be placed today and has signed off  -Pat would like to pursue all attempts at limb salvage.  Even if she does not have enough remaining heel tissue to bear weight " in the future, she would still like to forego amputation.  -We will plan for angiogram tomorrow  -Continue holding warfarin, continue antibiotics, heparin bridge as per primary team    Discussed with staff, Dr. Otto.    Torey Alvarado MD

## 2023-10-02 NOTE — PROGRESS NOTES
[facility]  Infectious Disease Progress Note          Assessment and Plan:   Date of Admission:  9/29/2023  Date of Consult (When I saw the patient): 09/30/23        Assessment & Plan  Linda Loredo is a 78 year old who was admitted on 9/29/2023.      Impression:   1. 78-year-old female, underlying venous stasis, probable peripheral vascular disease and a chronic left heel wound, obvious ongoing infection and clinically obvious osteomyelitis now proven by biopsy.  Wound and bone culture growing numerous organisms, doubt all these are involved in the osteo but all coming from the bone culture, almost no chance of salvage of limb long-term certain to have a long-term wound regardless of approach  2.  Probable peripheral vascular disease. Angiogram planned for tomorrow.   3.  Chronic venous stasis  4.  MRSA colonization among the wound culture as well  5.  Mild chronic kidney disease  6.  Clinically stable. Pain under control.      RECOMMENDATIONS:  Discussed in detail with patient and with daughter, no likely cure of this type of chronic osteo of the heel even with nonweightbearing and prolonged antibiotics, likely headed towards a BKA if our goal is cure. Patient is understandably tearful and wanting to avoid BKA if possible.    Continue meropenem and Vanco for now. Quite undesirable to be on these long-term and again we're not likely to cure this. Limiting toxicities has to be part of the plan long-term.  Possible approach if she does not want amputation here would be some IV antibiotics then long-term oral suppression, this plan is complicated by the microbiology which is quite complicated and likely to be difficult to suppress even.  Await angiogram tomorrow to further eval for PAD. Vascular Surgery is following.            Interval History:     no new complaints and doing well; no cp, sob, n/v/d, or abd pain. Micro same, pain OK, vascular plan noted              Medications:  "     aspirin  81 mg Oral Daily    atorvastatin  40 mg Oral QPM    insulin aspart  1-7 Units Subcutaneous TID AC    insulin aspart  1-5 Units Subcutaneous At Bedtime    insulin degludec  15 Units Subcutaneous At Bedtime    meropenem  500 mg Intravenous Q6H    methadone  10 mg Oral Daily    methadone  5 mg Oral TID    sodium chloride (PF)  10-40 mL Intracatheter Q8H    vancomycin  1,250 mg Intravenous Q24H                  Physical Exam:   Blood pressure 102/63, pulse 79, temperature 98  F (36.7  C), temperature source Oral, resp. rate 18, height 1.676 m (5' 6\"), weight 116.4 kg (256 lb 9.9 oz), SpO2 98 %.  Wt Readings from Last 2 Encounters:   09/29/23 116.4 kg (256 lb 9.9 oz)   09/26/23 120.2 kg (265 lb)     Vital Signs with Ranges  Temp:  [97.6  F (36.4  C)-98  F (36.7  C)] 98  F (36.7  C)  Pulse:  [79-84] 79  Resp:  [18] 18  BP: (102)/(63-65) 102/63  SpO2:  [97 %-99 %] 98 %    Constitutional: Awake, alert, cooperative, no apparent distress. Tearful     Lungs: Clear to auscultation bilaterally, no crackles or wheezing   Cardiovascular: Regular rate and rhythm, normal S1 and S2, and no murmur noted   Abdomen: Normal bowel sounds, soft, non-distended, non-tender   Skin: No rashes, no cyanosis, some  edema, some cellulitis,  foot wrapped   Other:           Data:   All microbiology laboratory data reviewed.  Recent Labs   Lab Test 10/02/23  0653 10/01/23  0604 09/30/23  1620   WBC 6.0 6.8 8.2   HGB 9.0* 9.1* 9.1*   HCT 28.3* 27.8* 27.3*   MCV 94 93 93    150 148*     Recent Labs   Lab Test 10/02/23  0653 10/01/23  0604 09/30/23  0614   CR 1.02* 1.07* 1.15*          09/26/2023 1658 09/28/2023 1024 Anaerobic Bacterial Culture Routine [[id number]]   (Abnormal)   Tissue from Heel, Left    Final result Component Value   Culture 4+ Bacteroides fragilis Abnormal     Susceptibilities not routinely done, refer to antibiogram to view typical susceptibility profiles    4+ Mixed Aerobic and Anaerobic danny           "   09/26/2023 1658 09/26/2023 2034 Gram Stain [[id number]]   (Abnormal)   Tissue from Heel, Left    Final result Component Value   Gram Stain Result 3+ Gram positive cocci Abnormal    Gram Stain Result 3+ Gram negative bacilli Abnormal    Gram Stain Result No white blood cells seen Abnormal           09/26/2023 1658 10/02/2023 1202 Tissue Aerobic Bacterial Culture Routine [[id number]]    (Abnormal)   Tissue from Heel, Left    Final result Component Value   Culture 4+ Proteus mirabilis Abnormal     Susceptibilities done on previous cultures    4+ Morganella morganii Abnormal     Susceptibilities done on previous cultures    4+ Enterococcus faecalis Abnormal     Susceptibilities done on previous cultures    3+ Enterococcus avium Abnormal     3+ Staphylococcus aureus Abnormal     Susceptibilities done on previous cultures    2+ Normal danny    4+ Pseudomonas aeruginosa Abnormal     Susceptibilities done on previous cultures    4+ Bordetella trematum Abnormal     Susceptibilities done on previous cultures    3+ Corynebacterium striatum Abnormal     Identification obtained by MALDI-TOF mass spectrometry research use only database. Test characteristics determined and verified by the [facility].  Susceptibilities not routinely done, refer to antibiogram to view typica  l susceptibility profiles       Susceptibility     Enterococcus avium     ENDY     Ampicillin <=2 ug/mL Susceptible     Gentamicin Synergy Susceptible... Susceptible 1     Vancomycin <=0.5 ug/mL Susceptible              1 No high level gentamicin resistance found - therefore combination therapy with an aminoglycoside may be indicated for serious enterococcal infections such as bacteremia and endocarditis.            09/26/2023 1657 09/30/2023 1018 Anaerobic Bacterial Culture Routine [[id number]]   (Abnormal)   Bone Biopsy from Foot, Left    Final result Component Value   Culture 2+ Bacteroides fragilis Abnormal      Susceptibilities not routinely done, refer to antibiogram to view typical susceptibility profiles    2+ Mixed Aerobic and Anaerobic danny             09/26/2023 1657 09/26/2023 2035 Gram Stain [[id number]]   (Abnormal)   Bone Biopsy from Foot, Left    Final result Component Value   Gram Stain Result 2+ Gram positive cocci Abnormal    Gram Stain Result 1+ Gram positive bacilli Abnormal    Gram Stain Result 1+ Gram negative bacilli Abnormal    Gram Stain Result 2+ WBC seen Abnormal           09/26/2023 1657 10/02/2023 1314 Bone Biopsy Aerobic Bacterial Culture Routine [[id number]]    (Abnormal)   Bone Biopsy from Foot, Left    Final result Component Value   Culture 2+ Pseudomonas aeruginosa Abnormal     1+ Morganella morganii Abnormal     1+ Escherichia coli Abnormal     2+ Enterococcus faecalis Abnormal     2+ Enterococcus faecalis Abnormal     1+ Staphylococcus aureus MRSA Abnormal     1+ Proteus mirabilis Abnormal     2+ Bordetella trematum Abnormal     1+ Proteus mirabilis Abnormal     1+ Normal danny       Susceptibility     Pseudomonas aeruginosa Morganella morganii Escherichia coli Enterococcus faecalis (4)     ENDY ENDY ENDY ENDY     Amikacin <=2 ug/mL Susceptible           Ampicillin    Resistant 1 4 ug/mL Susceptible <=2 ug/mL Susceptible     Ampicillin/ Sulbactam   16 ug/mL Intermediate <=2 ug/mL Susceptible       Cefepime 8 ug/mL Susceptible <=1 ug/mL Susceptible <=1 ug/mL Susceptible       Ceftazidime 16 ug/mL Intermediate <=1 ug/mL Susceptible <=1 ug/mL Susceptible       Ceftriaxone   <=1 ug/mL Susceptible <=1 ug/mL Susceptible       Ciprofloxacin <=0.25 ug/mL Susceptible <=0.25 ug/mL Susceptible <=0.25 ug/mL Susceptible       Gentamicin <=1 ug/mL Susceptible <=1 ug/mL Susceptible <=1 ug/mL Susceptible       Gentamicin Synergy       Resistant u... Resistant 2     Levofloxacin 0.25 ug/mL Susceptible <=0.12 ug/mL Susceptible <=0.12 ug/mL Susceptible       Meropenem <=0.25 ug/mL Susceptible <=0.25 ug/mL  Susceptible <=0.25 ug/mL Susceptible       Piperacillin/Tazobactam 64 ug/mL Intermediate <=4 ug/mL Susceptible <=4 ug/mL Susceptible       Tobramycin <=1 ug/mL Susceptible <=1 ug/mL Susceptible <=1 ug/mL Susceptible       Trimethoprim/Sulfamethoxazole   <=1/19 ug/mL Susceptible <=1/19 ug/mL Susceptible       Vancomycin       1 ug/mL Susceptible                    1 Intrinsically Resistant   2 High level gentamicin resistance was found, and this is predictive of resistance to tobramycin and amikacin.        Susceptibility     Enterococcus faecalis (5) Staphylococcus aureus MRSA Proteus mirabilis (7) Bordetella trematum     ENDY ENDY ENDY ENDY     Amikacin       <=16 ug/mL Susceptible     Ampicillin <=2 ug/mL Susceptible   <=2 ug/mL Susceptible       Ampicillin/ Sulbactam     <=2 ug/mL Susceptible       Cefepime     <=1 ug/mL Susceptible 8.0 ug/mL Susceptible     Ceftazidime     <=1 ug/mL Susceptible 4.0 ug/mL Susceptible     Ceftriaxone     <=1 ug/mL Susceptible       Ciprofloxacin     <=0.25 ug/mL Susceptible >2 ug/mL Resistant     Clindamycin   >=4 ug/mL Resistant         Daptomycin   <=0.12 ug/mL Susceptible         Doxycycline   <=0.5 ug/mL Susceptible         Erythromycin   >=8 ug/mL Resistant         Gentamicin   <=0.5 ug/mL Susceptible <=1 ug/mL Susceptible 4.0 ug/mL Susceptible     Gentamicin Synergy Resistant u... Resistant 1           Levofloxacin     <=0.12 ug/mL Susceptible 2.0 ug/mL Susceptible     Linezolid   2 ug/mL Susceptible         Meropenem     <=0.25 ug/mL Susceptible <=1 ug/mL Susceptible     Oxacillin   >=4 ug/mL Resistant 2         Piperacillin/Tazobactam     <=4 ug/mL Susceptible <=4 ug/mL Susceptible     Tetracycline   <=1 ug/mL Susceptible         Tobramycin     <=1 ug/mL Susceptible 4.0 ug/mL Susceptible     Trimethoprim/Sulfamethoxazole   <=0.5/9.5 u... Susceptible <=1/19 ug/mL Susceptible <=2/38 ug/mL Susceptible     Vancomycin 1 ug/mL Susceptible <=0.5 ug/mL Susceptible                         1 High level gentamicin resistance was found, and this is predictive of resistance to tobramycin and amikacin.   2 Oxacillin susceptible isolates are susceptible to cephalosporins (example: cefazolin and cephalexin) and beta lactam combination agents. Oxacillin resistant isolates are resistant to these agents.        Susceptibility     Proteus mirabilis (9)     ENDY     Ampicillin <=2 ug/mL Susceptible     Ampicillin/ Sulbactam <=2 ug/mL Susceptible     Cefepime <=1 ug/mL Susceptible     Ceftazidime <=1 ug/mL Susceptible     Ceftriaxone <=1 ug/mL Susceptible     Ciprofloxacin <=0.25 ug/mL Susceptible     Gentamicin <=1 ug/mL Susceptible     Levofloxacin <=0.12 ug/mL Susceptible     Meropenem <=0.25 ug/mL Susceptible     Piperacillin/Tazobactam <=4 ug/mL Susceptible     Tobramycin <=1 ug/mL Susceptible     Trimethoprim/Sulfamethoxazole <=1/19 ug/mL Susceptible                Susceptibility Comments    Staphylococcus aureus MRSA   MRSA requires contact precautions.         09/25/2023 1620 09/30/2023 2004 Blood Culture Arm, Right [[id number]]    Blood from Arm, Right    Final result Component Value   Culture No Growth             09/25/2023 1540 09/30/2023 2004 Blood Culture Arm, Right [[id number]]   Blood from Arm, Right    Final result Component Value   Culture No Growth

## 2023-10-02 NOTE — PROGRESS NOTES
"SPIRITUAL HEALTH SERVICES Progress Note  [facility]. [address] medical specialties    Saw pt Linda DELGADILLO Awa Loredo per follow up from weekend  support.    Patient/Family Understanding of Illness and Goals of Care - \"Pat\" spoke of challenges with the infection in her foot and trying a number of different antibiotics, and a difficult visit from the \"all doom and gloom\" provider who spoke about the possibility of amputating her foot. She stated she was encouraged to consider continuing treatments versus amputation and that she has talked with her daughter and son in law for support already.    Distress and Loss - Pat began our visit stating she is afraid, and reflected on her illness journey, recent hospitalizations, falls at home and \"stubborn\" infections in her foot. She spoke of her surprise at the doctor visit yesterday and fears of \"losing a part of myself.\"    Strengths, Coping, and Resources - Pat describes herself as \"fiercly independent\" and that she values living on her own, would not want an assisted living type of residence.   Her daughter and a weekly prayer group are sources of support.    Meaning, Beliefs, and Spirituality - Confucianism. Pat shared a meaningful image of clouds she saw yesterday and how they represented Brian and the angels with her. \"I pray every time I am afraid, and I pray for God to guide the medical team and our decision. That's the only right thing to do.\" Pat spoke of the sense of peace she has in knowing Brian is with her.    Plan of Care - unit chaplains will continue to follow.    Una Blanc MDiv  Staff   [facility] routine referrals *[phone]  [facility] available 24/7 for emergent requests/referrals, either by having the on-call  paged or by entering an ASAP/STAT consult in Epic (this will also page the on-call ).    "

## 2023-10-02 NOTE — PLAN OF CARE
Summary:  Cellulites/Osteomyelitis of left heel.     DATE & TIME: 10/02/23 8506-2914  Cognitive Concerns/ Orientation : A&OX4   BEHAVIOR & AGGRESSION TOOL COLOR: Green  CIWA SCORE: NA   ABNL VS/O2: VSS RA, Soft BP  MOBILITY: Stand and Pivot to BSC. Able to reposition in bed with help. Encouraged to weight shift every 2 hours  PAIN MANAGMENT: 6/10 on BLE, managed with scheduled methadone.   DIET: NPO since midnight for possible procedure  BOWEL/BLADDER: Continent. Purewick in placed with adequate output.   ABNL LAB/BG:  Hep Xa 0.42,  next check at 1011.  DRAIN/DEVICES: PICC on right upper arm SL with intermittent antibiotics.  PIV with Hep gtt @ 1100 units/hr.   TELEMETRY RHYTHM: NA  SKIN: Multiple wound on BLE and coccyx-meplilex in place  TESTS/PROCEDURES: Helenang  D/C DAY/GOALS/PLACE: Pending  OTHER IMPORTANT INFO: Pt able to make her needs known. Contact precautions maintained.

## 2023-10-02 NOTE — PROGRESS NOTES
Infection lab results taken by SADAF ORELLANA foot biopsy taken 9/26 at 1657 growing multiple organisms, MRSA growing in Culture along with other visible organisms

## 2023-10-03 ENCOUNTER — APPOINTMENT (OUTPATIENT)
Dept: SURGERY | Facility: PHYSICIAN GROUP | Age: 78
End: 2023-10-03
Payer: COMMERCIAL

## 2023-10-03 ENCOUNTER — APPOINTMENT (OUTPATIENT)
Dept: INTERVENTIONAL RADIOLOGY/VASCULAR | Facility: CLINIC | Age: 78
DRG: 617 | End: 2023-10-03
Attending: SURGERY
Payer: COMMERCIAL

## 2023-10-03 LAB
CREAT SERPL-MCNC: 0.94 MG/DL (ref 0.51–0.95)
EGFRCR SERPLBLD CKD-EPI 2021: 62 ML/MIN/1.73M2
ERYTHROCYTE [DISTWIDTH] IN BLOOD BY AUTOMATED COUNT: 13 % (ref 10–15)
GLUCOSE BLDC GLUCOMTR-MCNC: 111 MG/DL (ref 70–99)
GLUCOSE BLDC GLUCOMTR-MCNC: 116 MG/DL (ref 70–99)
GLUCOSE BLDC GLUCOMTR-MCNC: 123 MG/DL (ref 70–99)
GLUCOSE BLDC GLUCOMTR-MCNC: 135 MG/DL (ref 70–99)
GLUCOSE BLDC GLUCOMTR-MCNC: 91 MG/DL (ref 70–99)
HCT VFR BLD AUTO: 29.5 % (ref 35–47)
HGB BLD-MCNC: 9.3 G/DL (ref 11.7–15.7)
INR PPP: 1.31 (ref 0.85–1.15)
MCH RBC QN AUTO: 29.7 PG (ref 26.5–33)
MCHC RBC AUTO-ENTMCNC: 31.5 G/DL (ref 31.5–36.5)
MCV RBC AUTO: 94 FL (ref 78–100)
PLATELET # BLD AUTO: 154 10E3/UL (ref 150–450)
RBC # BLD AUTO: 3.13 10E6/UL (ref 3.8–5.2)
UFH PPP CHRO-ACNC: 0.25 IU/ML
WBC # BLD AUTO: 6.5 10E3/UL (ref 4–11)

## 2023-10-03 PROCEDURE — 36247 INS CATH ABD/L-EXT ART 3RD: CPT | Performed by: SURGERY

## 2023-10-03 PROCEDURE — 250N000011 HC RX IP 250 OP 636: Mod: JZ | Performed by: HOSPITALIST

## 2023-10-03 PROCEDURE — 250N000013 HC RX MED GY IP 250 OP 250 PS 637: Performed by: HOSPITALIST

## 2023-10-03 PROCEDURE — 250N000011 HC RX IP 250 OP 636: Performed by: INTERNAL MEDICINE

## 2023-10-03 PROCEDURE — 85027 COMPLETE CBC AUTOMATED: CPT | Performed by: HOSPITALIST

## 2023-10-03 PROCEDURE — 258N000003 HC RX IP 258 OP 636: Performed by: STUDENT IN AN ORGANIZED HEALTH CARE EDUCATION/TRAINING PROGRAM

## 2023-10-03 PROCEDURE — 99233 SBSQ HOSP IP/OBS HIGH 50: CPT | Mod: 25 | Performed by: INTERNAL MEDICINE

## 2023-10-03 PROCEDURE — 255N000002 HC RX 255 OP 636: Performed by: SURGERY

## 2023-10-03 PROCEDURE — 85520 HEPARIN ASSAY: CPT | Performed by: INTERNAL MEDICINE

## 2023-10-03 PROCEDURE — 75710 ARTERY X-RAYS ARM/LEG: CPT | Mod: 26 | Performed by: SURGERY

## 2023-10-03 PROCEDURE — 75625 CONTRAST EXAM ABDOMINL AORTA: CPT | Mod: 26 | Performed by: SURGERY

## 2023-10-03 PROCEDURE — 250N000009 HC RX 250: Performed by: STUDENT IN AN ORGANIZED HEALTH CARE EDUCATION/TRAINING PROGRAM

## 2023-10-03 PROCEDURE — 36247 INS CATH ABD/L-EXT ART 3RD: CPT

## 2023-10-03 PROCEDURE — 272N000567 HC SHEATH CR4

## 2023-10-03 PROCEDURE — 99152 MOD SED SAME PHYS/QHP 5/>YRS: CPT | Performed by: SURGERY

## 2023-10-03 PROCEDURE — 272N000116 HC CATH CR1

## 2023-10-03 PROCEDURE — C1760 CLOSURE DEV, VASC: HCPCS

## 2023-10-03 PROCEDURE — 258N000003 HC RX IP 258 OP 636: Performed by: INTERNAL MEDICINE

## 2023-10-03 PROCEDURE — 75774 ARTERY X-RAY EACH VESSEL: CPT

## 2023-10-03 PROCEDURE — 250N000013 HC RX MED GY IP 250 OP 250 PS 637: Performed by: INTERNAL MEDICINE

## 2023-10-03 PROCEDURE — 82565 ASSAY OF CREATININE: CPT | Performed by: HOSPITALIST

## 2023-10-03 PROCEDURE — 250N000011 HC RX IP 250 OP 636: Performed by: STUDENT IN AN ORGANIZED HEALTH CARE EDUCATION/TRAINING PROGRAM

## 2023-10-03 PROCEDURE — 272N000124 HC CATH CR11

## 2023-10-03 PROCEDURE — 120N000001 HC R&B MED SURG/OB

## 2023-10-03 PROCEDURE — 272N000196 HC ACCESSORY CR5

## 2023-10-03 PROCEDURE — C1769 GUIDE WIRE: HCPCS

## 2023-10-03 PROCEDURE — 76937 US GUIDE VASCULAR ACCESS: CPT | Mod: 26 | Performed by: SURGERY

## 2023-10-03 PROCEDURE — 85610 PROTHROMBIN TIME: CPT | Performed by: INTERNAL MEDICINE

## 2023-10-03 RX ORDER — SODIUM CHLORIDE 9 MG/ML
INJECTION, SOLUTION INTRAVENOUS CONTINUOUS
Status: DISCONTINUED | OUTPATIENT
Start: 2023-10-03 | End: 2023-10-03 | Stop reason: HOSPADM

## 2023-10-03 RX ORDER — LIDOCAINE 40 MG/G
CREAM TOPICAL
Status: DISCONTINUED | OUTPATIENT
Start: 2023-10-03 | End: 2023-10-03 | Stop reason: HOSPADM

## 2023-10-03 RX ORDER — IODIXANOL 320 MG/ML
100 INJECTION, SOLUTION INTRAVASCULAR ONCE
Status: COMPLETED | OUTPATIENT
Start: 2023-10-03 | End: 2023-10-03

## 2023-10-03 RX ADMIN — LIDOCAINE HYDROCHLORIDE 12 ML: 10 INJECTION, SOLUTION INFILTRATION; PERINEURAL at 13:40

## 2023-10-03 RX ADMIN — MIDAZOLAM 1 MG: 1 INJECTION INTRAMUSCULAR; INTRAVENOUS at 13:39

## 2023-10-03 RX ADMIN — MEROPENEM 500 MG: 500 INJECTION, POWDER, FOR SOLUTION INTRAVENOUS at 05:53

## 2023-10-03 RX ADMIN — METHADONE HYDROCHLORIDE 5 MG: 5 TABLET ORAL at 11:26

## 2023-10-03 RX ADMIN — ASPIRIN 81 MG: 81 TABLET, COATED ORAL at 08:49

## 2023-10-03 RX ADMIN — MEROPENEM 500 MG: 500 INJECTION, POWDER, FOR SOLUTION INTRAVENOUS at 23:10

## 2023-10-03 RX ADMIN — FENTANYL CITRATE 50 MCG: 50 INJECTION, SOLUTION INTRAMUSCULAR; INTRAVENOUS at 13:36

## 2023-10-03 RX ADMIN — MIDAZOLAM 1 MG: 1 INJECTION INTRAMUSCULAR; INTRAVENOUS at 13:51

## 2023-10-03 RX ADMIN — METHADONE HYDROCHLORIDE 5 MG: 5 TABLET ORAL at 05:53

## 2023-10-03 RX ADMIN — ATORVASTATIN CALCIUM 40 MG: 40 TABLET, FILM COATED ORAL at 20:24

## 2023-10-03 RX ADMIN — SODIUM CHLORIDE: 9 INJECTION, SOLUTION INTRAVENOUS at 08:48

## 2023-10-03 RX ADMIN — MEROPENEM 500 MG: 500 INJECTION, POWDER, FOR SOLUTION INTRAVENOUS at 11:26

## 2023-10-03 RX ADMIN — IODIXANOL 50 ML: 320 INJECTION, SOLUTION INTRAVASCULAR at 13:56

## 2023-10-03 RX ADMIN — MEROPENEM 500 MG: 500 INJECTION, POWDER, FOR SOLUTION INTRAVENOUS at 17:51

## 2023-10-03 RX ADMIN — METHADONE HYDROCHLORIDE 5 MG: 5 TABLET ORAL at 23:10

## 2023-10-03 RX ADMIN — VANCOMYCIN HYDROCHLORIDE 1250 MG: 10 INJECTION, POWDER, LYOPHILIZED, FOR SOLUTION INTRAVENOUS at 20:24

## 2023-10-03 RX ADMIN — HEPARIN SODIUM 4 BAG: 200 INJECTION, SOLUTION INTRAVENOUS at 13:44

## 2023-10-03 RX ADMIN — FENTANYL CITRATE 50 MCG: 50 INJECTION, SOLUTION INTRAMUSCULAR; INTRAVENOUS at 13:50

## 2023-10-03 RX ADMIN — METHADONE HYDROCHLORIDE 10 MG: 10 TABLET ORAL at 18:38

## 2023-10-03 RX ADMIN — FENTANYL CITRATE 50 MCG: 50 INJECTION, SOLUTION INTRAMUSCULAR; INTRAVENOUS at 13:42

## 2023-10-03 RX ADMIN — MIDAZOLAM 1 MG: 1 INJECTION INTRAMUSCULAR; INTRAVENOUS at 13:30

## 2023-10-03 RX ADMIN — INSULIN DEGLUDEC 15 UNITS: 100 INJECTION, SOLUTION SUBCUTANEOUS at 22:03

## 2023-10-03 RX ADMIN — HEPARIN SODIUM 1100 UNITS/HR: 10000 INJECTION, SOLUTION INTRAVENOUS at 00:35

## 2023-10-03 ASSESSMENT — ACTIVITIES OF DAILY LIVING (ADL)
ADLS_ACUITY_SCORE: 28
ADLS_ACUITY_SCORE: 32
ADLS_ACUITY_SCORE: 28
ADLS_ACUITY_SCORE: 32
ADLS_ACUITY_SCORE: 28
ADLS_ACUITY_SCORE: 32

## 2023-10-03 NOTE — PROGRESS NOTES
I went to the patient's bedside to discuss the normal left lower extremity arteriogram.   I wanted to explain that this will lead to a major amputation in the lower extremity - left.   She very respectfully (and with a big smile) requested not to discuss this issue with her today. This is very understandable.     Unfortunately the situation will eventually lead to a left lower extremity below the amputation. Vascular surgery service available as needed.

## 2023-10-03 NOTE — PROGRESS NOTES
I called the patient's son-in-law Abdulazizdayton Majano and explained the findings of the left lower extremity angiogram to him.

## 2023-10-03 NOTE — PLAN OF CARE
Goal Outcome Evaluation: reviewed with patient   DATE & TIME: 10/3/2023 days     Cognitive Concerns/ Orientation :  alert and oriented x 4   BEHAVIOR & AGGRESSION TOOL COLOR:  green   CIWA SCORE: na    ABNL VS/O2:  VSS RA   MOBILITY:  up with assistance of 1 now on bedrest until 1630 due to angiogram   PAIN MANAGMENT:  medicated with scheduled methadone which keeps her pain at a 6   DIET:  Mod Cho was NPO   BOWEL/BLADDER:  using the purewick for urine and no stools this shift   ABNL LAB/BG: creat 0.94, , 111 INR 1.31 hgb 9.3  DRAIN/DEVICES PICC line and SL for IV heparin drip which should be resumed at 1830 at 1100 units per hour and recheck Hep X A 6 hours after   TELEMETRY RHYTHM:  na   SKIN:  bilateral lower leg wounds and left heel wound, noted some petechiae on left lower arm periarea redness and creams applied.   TESTS/PROCEDURES:  L leg angiogram today   D/C DATE:  uncertain   Discharge Barriers:  needs to have plan for wound   OTHER IMPORTANT INFO:  pt was NPO for angiogram which was done this afternoon. Pt will need to lie flat until 1630 with right leg straight. Pt needs to have Heparin drip resumed at 1830.

## 2023-10-03 NOTE — PROGRESS NOTES
[facility]    Hospitalist Progress Note    Assessment & Plan   Linda DELGADILLO Awa Loredo is a 78 year old female admitted on 9/29/2023. She presents as a transfer from [facility] for L heel osteomyelitis     L foot decubitus ulcer status debridement by podiatry on 9/26 with multiple organisms  L heel osteomyelitis  L foot cellulitis  Chronic venous stasis  MRSA positive  -brought in 9/23 after welfare check by police found to have L leg swelling and erythema  -Hx venous stasis ulcers. Presents with L heel decubitus ulcer with osteo.   -Wound with multiple organisms incl pseudomonas, morganella, e faecalis, bordetella, proteus status underwent debridement 9/26 by podiatry.   -Blood cultures done on 9/25/2023 have been negative so far  -ALMA w/ poor flow, podiatry at outside hospital requested transfer where there are vascular services.  -On presentation to the floor patient was mildly hypertensive and was given IV fluids with improvement in blood pressure and she is asymptomatic.  -Patient is currently continued on vancomycin and meropenem as per prior recommendations and status post PICC line placement on 9/27 and infectious disease, podiatry and vascular surgery team has been consulted  - continue methadone 10 mg daily midday and 5 mg TID, dilaudid for breakthrough.  -She was seen by infectious disease team and the patient wants to try limb salvage if possible.  -Seen by podiatry and they have signed off and plan for wound VAC on 10/2.  -Seen by vascular surgery and and they reviewed her ALMA and arterial duplex, they are recommending to continue heparin drip and hold warfarin until 10/3 so that they could do angiogram.  Plan for angiogram later noted later today.  -Continue aspirin and statin, currently on heparin drip.  Will transition to warfarin after angiogram completed.     Urine culture positive for E faecalis, Staph aureus and E. Coli  -UA done at outside hospital on 9/23 showed above  "and patient is currently on broad-spectrum antibiotics including vancomycin and imipenem  -She is already on broad-spectrum antibiotics and continue the same.     DM II  [PTA degludec 35 units at HS, metformin 2000 mg daily, ozempic]  A1c 7.0 9/23/23. Has been on insulin pump in the past but this was changed to tresiba 8/2023.   -We will continue to hold metformin, Ozempic   -We will continue with  long-acting insulin at reduced dose along with sliding scale insulin and monitor her sugar this morning is stable     Atrial fibrillation  HTN  HLD  [PTA- warfarin, simvastatin 20 mg daily, lisinopril 5 mg daily]  -Continue with statin, warfarin currently is on hold, continue heparin drip.     CKD III  -Baseline creatinine 1-1.2.   -Stable, monitor occasionally, poorly possibly obtained creatinine following angiogram.  -Creatinine 0.94 today     Anemia  -Hgb at 9.3, baseline recent 9-10.        Failure to thrive  As above, called by police for welfare check. ? Safety at home. SW had seen at [facility].   -Social work team has been consulted     [place]  -We will continue home cpap      DVT Prophylaxis: heparin drip  Code Status: Full Code     52 MINUTES SPENT BY ME on the date of service doing chart review, history, exam, documentation & further activities per the note.  Disposition: Expected discharge in 1 to 2 days depending on timing of angiogram to TCU.  Clinically Significant Risk Factors               # Coagulation Defect: INR = 1.31 (Ref range: 0.85 - 1.15) and/or PTT = N/A, will monitor for bleeding      # Hypertension: Noted on problem list         # DMII: A1C = 7.0 % (Ref range: <5.7 %) within past 6 months  , PRESENT ON ADMISSION  # Severe Obesity: Estimated body mass index is 41.88 kg/m  as calculated from the following:    Height as of this encounter: 1.676 m (5' 6\").    Weight as of this encounter: 117.7 kg (259 lb 7.7 oz).  , PRESENT ON ADMISSION            Lynda Courtney MD  Text Page   (7am to 6pm)    Interval " History   Patient is n.p.o. for tentative angiogram later today, denies any new concerns, she is concerned about ongoing heel infection, want to do limb salvage if possible.    -Data reviewed today: I reviewed all new labs and imaging results over the last 24 hours.   Physical Exam     Vital Signs with Ranges  Temp:  [97.8  F (36.6  C)-97.9  F (36.6  C)] 97.8  F (36.6  C)  Pulse:  [66-78] 71  Resp:  [18] 18  BP: (119-123)/(60-70) 123/68  SpO2:  [96 %-99 %] 99 %  I/O last 3 completed shifts:  In: 480 [P.O.:480]  Out: 1500 [Urine:1500]    Constitutional: Awake, alert, cooperative, no apparent distress  Respiratory: Clear to auscultation bilaterally, no crackles or wheezing  Cardiovascular: Regular rate and rhythm, normal S1 and S2, and no murmur noted  GI: Normal bowel sounds, soft, non-distended, non-tender  Skin/Integumen: 2+ edema noted bilateral lower extremities, left heel status post surgery.  Neuro : moving all 4 extremities, no focal deficit noted     Medications    heparin      heparin Stopped (10/03/23 0913)    - MEDICATION INSTRUCTIONS -      sodium chloride 75 mL/hr at 10/03/23 0848      aspirin  81 mg Oral Daily    atorvastatin  40 mg Oral QPM    insulin aspart  1-7 Units Subcutaneous TID AC    insulin aspart  1-5 Units Subcutaneous At Bedtime    insulin degludec  15 Units Subcutaneous At Bedtime    meropenem  500 mg Intravenous Q6H    methadone  10 mg Oral Daily    methadone  5 mg Oral TID    sodium chloride (PF)  10-40 mL Intracatheter Q8H    sodium chloride (PF)  3 mL Intracatheter Q8H    vancomycin  1,250 mg Intravenous Q24H       Data   Recent Labs   Lab 10/03/23  0847 10/03/23  0637 10/03/23  0157 10/02/23  2024 10/02/23  0819 10/02/23  0653 10/01/23  0802 10/01/23  0604 10/01/23  0412 09/30/23  1115 09/30/23  0614   WBC  --  6.5  --   --   --  6.0  --  6.8  --    < > 8.5   HGB  --  9.3*  --   --   --  9.0*  --  9.1*  --    < > 9.2*   MCV  --  94  --   --   --  94  --  93  --    < > 93   PLT  --   154  --   --   --  161  --  150  --    < > 142*   INR  --  1.31*  --   --   --  2.05*  --   --  2.14*  --  1.91*   NA  --   --   --   --   --  138  --  137  --   --  137   POTASSIUM  --   --   --   --   --  3.8  --  3.6  --   --  3.6   CHLORIDE  --   --   --   --   --  101  --  99  --   --  98   CO2  --   --   --   --   --  30*  --  30*  --   --  30*   BUN  --   --   --   --   --  24.3*  --  27.3*  --   --  30.5*   CR  --  0.94  --   --   --  1.02*  --  1.07*  --   --  1.15*   ANIONGAP  --   --   --   --   --  7  --  8  --   --  9   ZAKIA  --   --   --   --   --  9.3  --  9.1  --   --  9.2   *  --  135* 162*   < > 153*   < > 149*  --    < > 170*    < > = values in this interval not displayed.       Recent Labs   Lab 10/03/23  0847 10/03/23  0157 10/02/23  2024 10/02/23  1651 10/02/23  1232   * 135* 162* 123* 152*         Imaging:   No results found for this or any previous visit (from the past 24 hour(s)).

## 2023-10-03 NOTE — PLAN OF CARE
Summary:  Cellulites/Osteomyelitis of left heel.     DATE & TIME: 10/03/23 0351-6694  Cognitive Concerns/ Orientation : A&OX4   BEHAVIOR & AGGRESSION TOOL COLOR: Green  CIWA SCORE: NA   ABNL VS/O2: VSS RA  MOBILITY: Ax1 stand and Pivot to BSC, no oob this shift. Repo self in bed with assistance. Weight shifting encouraged  PAIN MANAGMENT: 6/10 BLE  pain, receiving scheduled methadone.   DIET: NPO since midnight  BOWEL/BLADDER: Continent. Purewick in placed with adequate output.   ABNL LAB/BG:  Next Hep Xa at 0600.    DRAIN/DEVICES: PICC on R upper arm saline locked w/intermittent vanco and meropenem.  PIV with Hep gtt @ 1100 units/hr.   TELEMETRY RHYTHM: NA  SKIN: Multiple wounds to BLE CDI; coccyx redness with open areas-meplilex in place - CDI  TESTS/PROCEDURES: LLE angio scheduled for 10/3  D/C DAY/GOALS/PLACE: Pending  OTHER IMPORTANT INFO: To be reassessed 10/4 for wound vac - Hep gtt to be stopped at 0900 10/03                   Yes past 3 months

## 2023-10-03 NOTE — CONSULTS
Care Management Initial Consult    General Information  Assessment completed with: Patient,    Type of CM/SW Visit: Initial Assessment    Primary Care Provider verified and updated as needed: Yes   Readmission within the last 30 days: current reason for admission unrelated to previous admission      Reason for Consult: decision-making  Advance Care Planning:            Communication Assessment  Patient's communication style: spoken language (English or Bilingual)    Hearing Difficulty or Deaf: no   Wear Glasses or Blind: yes    Cognitive  Cognitive/Neuro/Behavioral: WDL                      Living Environment:   People in home: alone     Current living Arrangements: apartment      Able to return to prior arrangements: no       Family/Social Support:  Care provided by: self, child(angy)  Provides care for: no one  Marital Status:   Children          Description of Support System: Supportive         Current Resources:   Patient receiving home care services: Yes  Skilled Home Care Services: Skilled Nursing  Community Resources: County Worker  Equipment currently used at home: grab bar, tub/shower, grab bar, toilet, wheelchair, manual  Supplies currently used at home: Wound Care Supplies    Employment/Financial:  Employment Status: retired        Financial Concerns: No concerns identified           Does the patient's insurance plan have a 3 day qualifying hospital stay waiver?  No    Lifestyle & Psychosocial Needs:  Social Determinants of Health     Food Insecurity: Not on file   Depression: Not on file   Housing Stability: Not on file   Tobacco Use: Low Risk  (10/3/2023)    Patient History     Smoking Tobacco Use: Never     Smokeless Tobacco Use: Never     Passive Exposure: Not on file   Financial Resource Strain: Not on file   Alcohol Use: Not on file   Transportation Needs: Not on file   Physical Activity: Not on file   Interpersonal Safety: Not on file   Stress: Not on file   Social Connections: Not on file  "      Functional Status:  Prior to admission patient needed assistance:   Dependent ADLs:: Wheelchair-with assist  Dependent IADLs:: Meal Preparation, Medication Management, Shopping, Laundry, Cooking, Cleaning  Assesssment of Functional Status: Not at baseline with ADL Functioning, Not at baseline with mobility, Not at  functional baseline, Needs placement in a SNF/TCF for rehabilitation    Mental Health Status:  Mental Health Status: No Current Concerns       Chemical Dependency Status:  Chemical Dependency Status: No Current Concerns             Values/Beliefs:  Spiritual, Cultural Beliefs, Temple Practices, Values that affect care: no               Additional Information:  CM spoke with patient and her daughter regarding plan of care and discharge plans. Noted patient was transferred from [facility] for Lheel Osteomyelitis . Patient was adm to [facility] on 9/22 and transferred on 9/29.  Patient was seen by CM on 9/26 and the decision was patient will be moving to TCU for rehab and patient was accepted by  [facility] (Admissions Phone: [phone] Main Phone: [phone] Fax: [phone]) for TCU cares when medically stable.    Patient stating she was picked by police after a well fare check and patient was found on the floor unable to get up  extrement on the floor in a different area of the resident that had not been cleaned up.     Patient states \" I had a blow out and was unable to make it to the bathroom in time\". Pt has open wounds/scabs on BLE and bottom of feet; extensive excoriated skin under abdominal folds, thighs, and buttocks; scabs and open wounds on back of thighs and rectum   Daughter states patient is in a w/c all day. Patient at baseline is able to stand and pivot to her w/c and manually mobilize the w/c. Daughter states they have been looking for a powered w/c  and her care coordinator is aware of this and is in the process of ordering  one..  Patient is current with " [facility] ([phone] Fax: ([phone]) for RN wound care services.   Patient states she does have a CM that she is working with from [facility].   Patient will need TCU at discharge and daughter is in agreement,  Noreen Velasquez, [phone]

## 2023-10-03 NOTE — PROGRESS NOTES
We did a left lower extremity angiogram on Linda today.  Her aortoiliac segment and femoral-popliteal segment as well as the tibial vessels are all patent.  There was a 20% stenosis in the second segment of the popliteal artery.  Outside of that the circulation is well-preserved.  Not really much to improve in terms of circulation.    I have reviewed the documentation from our colleagues in infectious diseases and podiatry.  It seems that unfortunately she is headed towards the left below-knee amputation.

## 2023-10-03 NOTE — IR NOTE
Interventional Radiology Intra-procedural Nursing Note    Patient Name: Linda Loredo  Medical Record Number: [id number]  Today's Date: October 3, 2023    Procedure consented for : left lower extremity angiogram with possible intervention and moderate sedation  Start time: 1335  End time: 1359  Report provided to: ST Amaya RN  Patient depart time and location: 1410 to 336    Note: Patient entered Interventional Radiology Suite number 1 via cart. Patient awake, alert and oriented. Assisted onto procedural table in supine position. Prepped and draped.  Dr. Otto in room. Time out and procedure started. Vital signs stable. Telemetry reading A fib.    Procedure well tolerated by patient without complications. Procedure end with debrief by Dr. Otto.  Pressure held until hemostasis achieved. Quick clot and tegaderm dressing applied to right groin.    2 hours bedrest.    Administered medication totals:  Lidocaine 1% 12 mL Intradermal    Versed 3 mg IVP  Fentanyl 150 mcg IVP    Last dose of sedation administered at 1351.

## 2023-10-03 NOTE — PLAN OF CARE
Goal Outcome Evaluation:    Summary:  Cellulites/Osteomyelitis of left heel.     DATE & TIME: 10/02/23 [id number]  Cognitive Concerns/ Orientation : A&OX4   BEHAVIOR & AGGRESSION TOOL COLOR: Green  CIWA SCORE: NA   ABNL VS/O2: VSS RA, Soft BP  MOBILITY: Stand and Pivot to BSC. Repo self in bed with assistance. Weight shifting encouraged  PAIN MANAGMENT: 5/10 BLE neuropathic pain, receiving scheduled methadone.   DIET: NPO at midnight   BOWEL/BLADDER: Continent. Purewick in placed with adequate output.   ABNL LAB/BG:  Hep Xa 0.42 (recheck @0600),  , 162  DRAIN/DEVICES: PICC on right upper arm SL w/intermittent vanco and meropenem.  PIV with Hep gtt @ 1100 units/hr.   TELEMETRY RHYTHM: NA  SKIN: Multiple wounds to BLE dressed by WOC today coccyx-meplilex in place - CDI  TESTS/PROCEDURES: LLE angio scheduled for 10/3  D/C DAY/GOALS/PLACE: Pending  OTHER IMPORTANT INFO: To be reassessed 10/4 for wound vac - Hep to be stopped at 0900 10/3

## 2023-10-04 ENCOUNTER — APPOINTMENT (OUTPATIENT)
Dept: PHYSICAL THERAPY | Facility: CLINIC | Age: 78
DRG: 617 | End: 2023-10-04
Attending: HOSPITALIST
Payer: COMMERCIAL

## 2023-10-04 LAB
CREAT SERPL-MCNC: 0.93 MG/DL (ref 0.51–0.95)
EGFRCR SERPLBLD CKD-EPI 2021: 63 ML/MIN/1.73M2
GLUCOSE BLDC GLUCOMTR-MCNC: 141 MG/DL (ref 70–99)
GLUCOSE BLDC GLUCOMTR-MCNC: 144 MG/DL (ref 70–99)
GLUCOSE BLDC GLUCOMTR-MCNC: 154 MG/DL (ref 70–99)
GLUCOSE BLDC GLUCOMTR-MCNC: 162 MG/DL (ref 70–99)
GLUCOSE BLDC GLUCOMTR-MCNC: 170 MG/DL (ref 70–99)
INR PPP: 1.39 (ref 0.85–1.15)
UFH PPP CHRO-ACNC: 0.34 IU/ML
UFH PPP CHRO-ACNC: 0.43 IU/ML
UFH PPP CHRO-ACNC: <0.1 IU/ML
VANCOMYCIN SERPL-MCNC: 15.9 UG/ML

## 2023-10-04 PROCEDURE — 82565 ASSAY OF CREATININE: CPT | Performed by: HOSPITALIST

## 2023-10-04 PROCEDURE — 80202 ASSAY OF VANCOMYCIN: CPT | Performed by: HOSPITALIST

## 2023-10-04 PROCEDURE — 250N000011 HC RX IP 250 OP 636: Mod: JZ | Performed by: HOSPITALIST

## 2023-10-04 PROCEDURE — G0463 HOSPITAL OUTPT CLINIC VISIT: HCPCS

## 2023-10-04 PROCEDURE — 99233 SBSQ HOSP IP/OBS HIGH 50: CPT | Performed by: INTERNAL MEDICINE

## 2023-10-04 PROCEDURE — 250N000011 HC RX IP 250 OP 636: Performed by: INTERNAL MEDICINE

## 2023-10-04 PROCEDURE — 250N000013 HC RX MED GY IP 250 OP 250 PS 637: Performed by: INTERNAL MEDICINE

## 2023-10-04 PROCEDURE — 97530 THERAPEUTIC ACTIVITIES: CPT | Mod: GP

## 2023-10-04 PROCEDURE — 85520 HEPARIN ASSAY: CPT | Performed by: HOSPITALIST

## 2023-10-04 PROCEDURE — 250N000011 HC RX IP 250 OP 636: Performed by: HOSPITALIST

## 2023-10-04 PROCEDURE — 258N000003 HC RX IP 258 OP 636: Performed by: HOSPITALIST

## 2023-10-04 PROCEDURE — B41D1ZZ FLUOROSCOPY OF AORTA AND BILATERAL LOWER EXTREMITY ARTERIES USING LOW OSMOLAR CONTRAST: ICD-10-PCS | Performed by: SURGERY

## 2023-10-04 PROCEDURE — 120N000001 HC R&B MED SURG/OB

## 2023-10-04 PROCEDURE — 999N000128 HC STATISTIC PERIPHERAL IV START W/O US GUIDANCE

## 2023-10-04 PROCEDURE — 250N000013 HC RX MED GY IP 250 OP 250 PS 637: Performed by: HOSPITALIST

## 2023-10-04 PROCEDURE — 85610 PROTHROMBIN TIME: CPT | Performed by: INTERNAL MEDICINE

## 2023-10-04 PROCEDURE — 99232 SBSQ HOSP IP/OBS MODERATE 35: CPT | Performed by: PHYSICIAN ASSISTANT

## 2023-10-04 PROCEDURE — 85520 HEPARIN ASSAY: CPT | Performed by: INTERNAL MEDICINE

## 2023-10-04 PROCEDURE — 999N000040 HC STATISTIC CONSULT NO CHARGE VASC ACCESS

## 2023-10-04 RX ADMIN — VANCOMYCIN HYDROCHLORIDE 1500 MG: 10 INJECTION, POWDER, LYOPHILIZED, FOR SOLUTION INTRAVENOUS at 21:16

## 2023-10-04 RX ADMIN — METHADONE HYDROCHLORIDE 5 MG: 5 TABLET ORAL at 20:01

## 2023-10-04 RX ADMIN — ATORVASTATIN CALCIUM 40 MG: 40 TABLET, FILM COATED ORAL at 20:01

## 2023-10-04 RX ADMIN — HEPARIN SODIUM 1400 UNITS/HR: 10000 INJECTION, SOLUTION INTRAVENOUS at 05:51

## 2023-10-04 RX ADMIN — METHADONE HYDROCHLORIDE 10 MG: 10 TABLET ORAL at 14:52

## 2023-10-04 RX ADMIN — METHADONE HYDROCHLORIDE 5 MG: 5 TABLET ORAL at 11:18

## 2023-10-04 RX ADMIN — METHADONE HYDROCHLORIDE 5 MG: 5 TABLET ORAL at 06:28

## 2023-10-04 RX ADMIN — MEROPENEM 500 MG: 500 INJECTION, POWDER, FOR SOLUTION INTRAVENOUS at 05:50

## 2023-10-04 RX ADMIN — MICONAZOLE NITRATE: 2 POWDER TOPICAL at 20:02

## 2023-10-04 RX ADMIN — INSULIN ASPART 1 UNITS: 100 INJECTION, SOLUTION INTRAVENOUS; SUBCUTANEOUS at 14:52

## 2023-10-04 RX ADMIN — MEROPENEM 500 MG: 500 INJECTION, POWDER, FOR SOLUTION INTRAVENOUS at 11:19

## 2023-10-04 RX ADMIN — INSULIN DEGLUDEC 15 UNITS: 100 INJECTION, SOLUTION SUBCUTANEOUS at 21:08

## 2023-10-04 RX ADMIN — INSULIN ASPART 1 UNITS: 100 INJECTION, SOLUTION INTRAVENOUS; SUBCUTANEOUS at 08:51

## 2023-10-04 RX ADMIN — ASPIRIN 81 MG: 81 TABLET, COATED ORAL at 08:51

## 2023-10-04 RX ADMIN — MEROPENEM 500 MG: 500 INJECTION, POWDER, FOR SOLUTION INTRAVENOUS at 18:41

## 2023-10-04 ASSESSMENT — ACTIVITIES OF DAILY LIVING (ADL)
ADLS_ACUITY_SCORE: 34
ADLS_ACUITY_SCORE: 32
ADLS_ACUITY_SCORE: 34
ADLS_ACUITY_SCORE: 32
ADLS_ACUITY_SCORE: 34
ADLS_ACUITY_SCORE: 32

## 2023-10-04 NOTE — DISCHARGE INSTRUCTIONS
WOUND CARES   Bilateral lower legs: every 3 days and prn:  1. Cleanse any open areas with wound cleanser.  Cover with Mepilex.  2. Mepilex to right heel for protection prn.  3. Elevate legs; float right heel, cushion left BKA.    Right BKA site: Daily and PRN for saturation due to copious drainage   1. Use Vashe soaked gauze fluffs to cleanse sreekanth-wound skin out to about 4 in. Let dry.   2. Place Vashe soaked gauze fluffs over wound bed for about 2 minutes to cleanse and debride wound bed.   3. Swab sreekanth-wound skin with no-sting barrier and let dry completely.   4. Cover two open areas on lateral thigh with Xeroform gauze. Cut to approximately fit wound bed.   5. Cover stump wound with Adaptic.   6. Use Vashe soaked Kerlix roll to cover wound bed.   7. Use two ABD pads to cover BKA stump. Secure with Medipore tape.   8. Wrap with Kerlix and secure with Medipore tape.     Location: Buttock/ left IT/ perineal   Provided: by primary RN qshift   1. Cleanse with generous amount Marisol perineal lotion and soft dry cloths.  Spray the Marisol directly on the skin.  Ensure cleansing to base of all folds.    2. Apply Adapt Stoma powder (#[id number]) to all skin folds and wound areas, ok to apply powder directly onto open bleeding areas.  Spread the powder with a dry cloth - this helps get a thin, even layer. Ok to apply as often as preferred by patient, at least qshift   3. Trial using the PureWick only when needs to void.   4. PIP measures.  Bariatric low air loss mattress.

## 2023-10-04 NOTE — PROGRESS NOTES
[facility]    Hospitalist Progress Note    Assessment & Plan   Linda DELGADILLO Awa Loredo is a 78 year old female admitted on 9/29/2023. She presents as a transfer from [facility] for L heel osteomyelitis     L foot decubitus ulcer status debridement by podiatry on 9/26 with multiple organisms  L heel osteomyelitis  L foot cellulitis  Chronic venous stasis  MRSA positive  -brought in 9/23 after welfare check by police found to have L leg swelling and erythema  -Hx venous stasis ulcers. Presents with L heel decubitus ulcer with osteo.   -Wound with multiple organisms incl pseudomonas, morganella, e faecalis, bordetella, proteus status underwent debridement 9/26 by podiatry.   -Blood cultures done on 9/25/2023 have been negative so far  -ALMA w/ poor flow, podiatry at outside hospital requested transfer where there are vascular services.  -On presentation to the floor patient was mildly hypertensive and was given IV fluids with improvement in blood pressure and she is asymptomatic.  -Patient is currently continued on vancomycin and meropenem as per prior recommendations and status post PICC line placement on 9/27 and infectious disease, podiatry and vascular surgery team has been consulted  - continue methadone 10 mg daily midday and 5 mg TID, dilaudid for breakthrough.  -She was seen by infectious disease team and the patient wants to try limb salvage if possible.  -Seen by podiatry and they have signed off and plan for wound VAC on 10/2.  -Seen by vascular surgery and and they reviewed her ALMA and arterial duplex, they are recommending to continue heparin drip and hold warfarin until 10/3 so that they could do angiogram.  Angiogram done on 10/3 did not indicate any significant obstruction in her left lower extremity.  Vascular surgery is discussing amputation options with the patient.  -Continue aspirin and statin, currently on heparin drip.  Will transition to warfarin after angiogram  completed.  Will wait for plan for further surgery prior to transitioning out.     Urine culture positive for E faecalis, Staph aureus and E. Coli  -UA done at outside hospital on 9/23 showed above and patient is currently on broad-spectrum antibiotics including vancomycin and imipenem  -She is already on broad-spectrum antibiotics and continue the same.     DM II  [PTA degludec 35 units at HS, metformin 2000 mg daily, ozempic]  A1c 7.0 9/23/23. Has been on insulin pump in the past but this was changed to tresiba 8/2023.   -We will continue to hold metformin, Ozempic   -We will continue with  long-acting insulin at reduced dose along with sliding scale insulin and monitor her sugar this morning is stable     Atrial fibrillation  HTN  HLD  [PTA- warfarin, simvastatin 20 mg daily, lisinopril 5 mg daily]  -Continue with statin, warfarin currently is on hold, continue heparin drip.     CKD III  -Baseline creatinine 1-1.2.   -Stable, monitor occasionally, poorly possibly obtained creatinine following angiogram.  -And is currently stable     Anemia  -Hgb at 9.3, baseline recent 9-10.        Failure to thrive  As above, called by police for welfare check. ? Safety at home. SW had seen at [facility].   -Social work team has been consulted, tentative plan is possible discharge to TCU depending on surgical plan.     [place]  -We will continue home cpap      DVT Prophylaxis: heparin drip  Code Status: Full Code     52 MINUTES SPENT BY ME on the date of service doing chart review, history, exam, documentation & further activities per the note.  Disposition: Expected discharge in 1 to 2 days depending plan for surgery.-Amputation  Clinically Significant Risk Factors               # Coagulation Defect: INR = 1.39 (Ref range: 0.85 - 1.15) and/or PTT = N/A, will monitor for bleeding      # Hypertension: Noted on problem list         # DMII: A1C = 7.0 % (Ref range: <5.7 %) within past 6 months     # Severe Obesity: Estimated body mass index  "is 41.88 kg/m  as calculated from the following:    Height as of this encounter: 1.676 m (5' 6\").    Weight as of this encounter: 117.7 kg (259 lb 7.7 oz).               Lynda Courtney MD  Text Page   (7am to 6pm)    Interval History   Patient was told the results of the angiogram which possibly lead to the neck step of management which would be amputation of her left lower extremity, patient is not happy about the plan but she understands that she is at [facility] now that she have to make a decision.    -Data reviewed today: I reviewed all new labs and imaging results over the last 24 hours.   Physical Exam     Vital Signs with Ranges  Temp:  [97.7  F (36.5  C)-98.5  F (36.9  C)] 97.9  F (36.6  C)  Pulse:  [] 71  Resp:  [6-20] 14  BP: (104-146)/(59-99) 134/81  SpO2:  [89 %-98 %] 91 %  I/O last 3 completed shifts:  In: 20 [P.O.:20]  Out: 800 [Urine:800]    Constitutional: Awake, alert, cooperative, no apparent distress  Respiratory: Clear to auscultation bilaterally, no crackles or wheezing  Cardiovascular: Regular rate and rhythm, normal S1 and S2, and no murmur noted  GI: Normal bowel sounds, soft, non-distended, non-tender  Skin/Integumen: 2+ edema noted bilateral lower extremities, left heel status post surgery.  Neuro : moving all 4 extremities, no focal deficit noted     Medications    heparin 1,400 Units/hr (10/04/23 0551)    - MEDICATION INSTRUCTIONS -        aspirin  81 mg Oral Daily    atorvastatin  40 mg Oral QPM    insulin aspart  1-7 Units Subcutaneous TID AC    insulin aspart  1-5 Units Subcutaneous At Bedtime    insulin degludec  15 Units Subcutaneous At Bedtime    meropenem  500 mg Intravenous Q6H    methadone  10 mg Oral Daily    methadone  5 mg Oral TID    sodium chloride (PF)  10-40 mL Intracatheter Q8H    vancomycin  1,250 mg Intravenous Q24H       Data   Recent Labs   Lab 10/04/23  0852 10/04/23  0633 10/04/23  0207 10/03/23  2038 10/03/23  0847 10/03/23  0637 10/02/23  0819 " 10/02/23  0653 10/01/23  0802 10/01/23  0604 09/30/23  1115 09/30/23  0614   WBC  --   --   --   --   --  6.5  --  6.0  --  6.8   < > 8.5   HGB  --   --   --   --   --  9.3*  --  9.0*  --  9.1*   < > 9.2*   MCV  --   --   --   --   --  94  --  94  --  93   < > 93   PLT  --   --   --   --   --  154  --  161  --  150   < > 142*   INR  --  1.39*  --   --   --  1.31*  --  2.05*  --   --    < > 1.91*   NA  --   --   --   --   --   --   --  138  --  137  --  137   POTASSIUM  --   --   --   --   --   --   --  3.8  --  3.6  --  3.6   CHLORIDE  --   --   --   --   --   --   --  101  --  99  --  98   CO2  --   --   --   --   --   --   --  30*  --  30*  --  30*   BUN  --   --   --   --   --   --   --  24.3*  --  27.3*  --  30.5*   CR  --  0.93  --   --   --  0.94  --  1.02*  --  1.07*  --  1.15*   ANIONGAP  --   --   --   --   --   --   --  7  --  8  --  9   ZAKIA  --   --   --   --   --   --   --  9.3  --  9.1  --  9.2   *  --  170* 116*   < >  --    < > 153*   < > 149*   < > 170*    < > = values in this interval not displayed.       Recent Labs   Lab 10/04/23  0852 10/04/23  0207 10/03/23  2038 10/03/23  1630 10/03/23  1247   * 170* 116* 91 111*         Imaging:   Recent Results (from the past 24 hour(s))   IR Lower Extremity Angiogram Left    Narrative    Preprocedure diagnosis: Left calcaneal ulcer with osteomyelitis.    Postprocedure diagnosis: Same.    PROCEDURE:  1. Ultrasound-guided right common femoral artery access placement.  2. Aortoiliac arteriogram with selective catheterization of left  common iliac, external iliac, common femoral, superficial femoral and  popliteal arteries (third order selective).  3. Left lower extremity arteriogram with runoff.  4. Right femoral arteriogram with right common femoral artery access  site closure using 6 Turkish Angio-Seal device.    Sedation: Patient received 3 mg of intravenous Versed and 150 mcg of  intravenous fentanyl. Medication was administered under my  direct  supervision. Patient was continuously monitored. Sedation was  administered by registered nurse in the Department of interventional  radiology.  Sedation time: 24 minutes.  Fluoroscopy time: 5.3 minutes.  Fluoroscopy dose: 111  Contrast: 50 mL.    Indication for procedure: This is a very pleasant and complicated  78-year-old female with left lower extremity calcaneal osteomyelitis  and ulcer. Arteriogram is advised to evaluate for any possibility of  limb salvage and revascularization if indicated.    Procedure details: Patient was identified and then taken to the  radiology suite and placed in supine position. Both the groins were  prepped and a sterile surgical field was created. Preprocedure timeout  was conducted. The right common femoral artery was located anterior to  the right femoral head by way of fluoroscopy. It was further localized  with ultrasonography. Images were stored. Local anesthetic was  administered in the right groin and the right common femoral artery  was accessed under direct sonographic guidance with a micropuncture  needle followed by placement of a microwire. This was then converted  to a 0.035 inch wire system. Short 5 Kiswahili sheath was placed. 0.035  inch Glidewire and Omni Flush catheter were advanced into the  infrarenal aorta. Aortoiliac arteriogram showed that the distal aorta,  both common iliac, hypogastric and external iliac arteries are widely  patent. Wire and catheter were then advanced into the distal left  external iliac artery. Arteriogram showed that the distal left  external iliac artery, common femoral artery, superficial femoral  artery and deep femoral arteries were patent. Wire was advanced into  the superficial femoral artery and then the went in with a Marc cross  catheter. Arteriogram showed that the superficial femoral and  popliteal arteries were patent. Wire and catheter were then advanced  into the second segment of the popliteal artery. Arteriogram  showed  that the popliteal artery, tibioperoneal trunk, anterior tibial  artery, posterior tibial artery and peroneal arteries are all widely  patent down to the foot and ankle with a complete plantar arch.    No intervention was felt necessary. Procedure was concluded. Right  common femoral arteriogram was performed and access site was closed  with a 6 Greenlandic Angio-Seal device.    After deploying the Angio-Seal device I did ultrasound and there was  excellent flow in the superficial and deep femoral arteries beyond the  area of the deployment. The foot plate of the Angio-Seal device was  appropriately deployed.    Procedure was concluded.    Patient was taken to the room in stable condition.    I called her son in law and updated him about our findings.     MANAN QUINN MD         SYSTEM ID:  [id number]

## 2023-10-04 NOTE — PROGRESS NOTES
Care Management Follow Up    Length of Stay (days): 5    Expected Discharge Date: 10/05/2023     Concerns to be Addressed: adjustment to diagnosis/illness, coping/stress, discharge planning     Patient plan of care discussed at interdisciplinary rounds: Yes  Anticipated Discharge Disposition: Transitional Care    Referrals Placed by CM/YUDY:  TCU to be sent  Private pay costs discussed: Not applicable    Additional Information:  YUDY spoke to patient's daughter who said that she has been having conversations for a long time with the patient about finding an assisted living. She and the patient's friends do not believe she is safe at home because of her falls, inability to get up after falling, and her neglecting to call for help. She does not want to move. Patient's daughter believes she may be agreeable to TCU now because of the possible need for IV antibiotics. Patient will likely want to be in [facility]. Family and patient had a bad experience at [facility] and will not go there. She goes to [facility] via medical transport once a month to pain clinic. She will need transport for TCU once one is located.     YUDY met with patient and went over medicare.gov list of TCU options close to [place]. She selected, in order of preference, [facility], [facility], [facility], [facility]. She is agreeable to cost of wheelchair transport. Referrals sesnt.     INNA Dunbar, [facility]   Social Work   [facility]

## 2023-10-04 NOTE — PROGRESS NOTES
"SPIRITUAL HEALTH SERVICES Progress Note  [facility]. [address] medical specialties    Saw pt Linda ELIE Loredo per follow up.  Provided emotional support, engaged pt in reflective conversation around decisions about foot amputation.    Patient/Family Understanding of Illness and Goals of Care - Margaret shared the sequence of events of the past day including her \"angio\" procedure, conversations with family and medical staff, stating she is \"at peace as I consider.\" Pat did not indicate a clear decision during our conversation.    A question that Pat repeated during our visit was if they can be sure how much of the leg would need to be removed before the surgery. She also expressed concern that infection could return even after the amputation; \"How do I know that this will be completely successful?\"    Distress and Loss - \"This is not a little thing. I would be losing a part of myself\" she stated, and spoke of how important it is to treat even a part of her body with respect, in the same ways that we do for an entire body after someone dies.    Strengths, Coping, and Resources - Margaret spoke of her daughter and son in law as \"very clear headed\" and that she has found their input and questions helpful in her process.    Margaret expressed her gratitude for being allowed to carry her rosary with her to the procedure yesterday.    Meaning, Beliefs, and Spirituality - Pat welcomed prayers for clarity and peace in her decision.    Plan of Care - I will follow up tomorrow as able. Unit chaplains will continue to follow.    Una Blanc MDiv  Staff   [facility] routine referrals *[phone]  [facility] available 24/7 for emergent requests/referrals, either by having the on-call  paged or by entering an ASAP/STAT consult in Epic (this will also page the on-call ).    "

## 2023-10-04 NOTE — PROGRESS NOTES
[facility]  Infectious Disease Progress Note          Assessment and Plan:     Assessment & Plan  Linda Loredo is a 78 year old who was admitted on 9/29/2023.      Impression:   1. 78-year-old female, underlying venous stasis, probable peripheral vascular disease and a chronic left heel wound, obvious ongoing infection and clinically obvious osteomyelitis now proven by biopsy.  Wound and bone culture growing numerous organisms, doubt all these are involved in the osteo but all coming from the bone culture, almost no chance of salvage of limb long-term certain to have a long-term wound regardless of approach  2.  Angiogram LLE shows adequate blood flow down leg to foot with no significant PAD.   3.  Chronic venous stasis with chronic edema  4.  MRSA colonization among the wound culture as well  5.  Mild chronic kidney disease  6.  Clinically stable. Pain under control.      RECOMMENDATIONS:  Discussed in detail with patient and with daughter, no likely cure of this type of chronic osteo of the heel even with nonweightbearing and prolonged antibiotics, likely headed towards a BKA if our goal is cure. Patient is understandably tearful and wanting to avoid BKA if possible but knows this is probably what is needed. She wants to think about it more.   Continue meropenem and Vanco for now. Quite undesirable to be on these long-term and again we're not likely to cure this. Limiting toxicities has to be part of the plan long-term.  Possible approach if she does not want amputation here would be some IV antibiotics then long-term oral suppression, this plan is complicated by the microbiology which is quite complicated and likely to be difficult to suppress even.  Discussed patient with Vascular Surgery and evaluated patient alongside Vascular Surgery. Also discussed with [facility] and Home nurse.    Discussed patient and plan with Dr. Gonzalez.      Lor Kowalski PA-C             Interval History:     no  "new complaints and doing well; no cp, sob, n/v/d, or abd pain. Micro same, pain OK, very tearful and overwhelmed given the thought of possible BKA.               Medications:      aspirin  81 mg Oral Daily    atorvastatin  40 mg Oral QPM    insulin aspart  1-7 Units Subcutaneous TID AC    insulin aspart  1-5 Units Subcutaneous At Bedtime    insulin degludec  15 Units Subcutaneous At Bedtime    meropenem  500 mg Intravenous Q6H    methadone  10 mg Oral Daily    methadone  5 mg Oral TID    sodium chloride (PF)  10-40 mL Intracatheter Q8H    vancomycin  1,250 mg Intravenous Q24H                  Physical Exam:   Blood pressure 134/81, pulse 71, temperature 97.9  F (36.6  C), temperature source Oral, resp. rate 14, height 1.676 m (5' 6\"), weight 117.7 kg (259 lb 7.7 oz), SpO2 91 %.  Wt Readings from Last 2 Encounters:   10/03/23 117.7 kg (259 lb 7.7 oz)   09/26/23 120.2 kg (265 lb)     Vital Signs with Ranges  Temp:  [97.7  F (36.5  C)-98.5  F (36.9  C)] 97.9  F (36.6  C)  Pulse:  [] 71  Resp:  [6-20] 14  BP: (104-146)/(59-99) 134/81  SpO2:  [89 %-98 %] 91 %    Constitutional: Awake, alert, cooperative, no apparent distress. Tearful     Lungs: Clear to auscultation bilaterally, no crackles or wheezing   Cardiovascular: Regular rate and rhythm, normal S1 and S2, and no murmur noted   Abdomen: Normal bowel sounds, soft, non-distended, non-tender   Skin: No rashes, no cyanosis, some  edema, some cellulitis,  foot wrapped. Lymphedema and venous stasis changes in legs.    Other:           Data:   All microbiology laboratory data reviewed.  Recent Labs   Lab Test 10/03/23  0637 10/02/23  0653 10/01/23  0604   WBC 6.5 6.0 6.8   HGB 9.3* 9.0* 9.1*   HCT 29.5* 28.3* 27.8*   MCV 94 94 93    161 150     Recent Labs   Lab Test 10/04/23  0633 10/03/23  0637 10/02/23  0653   CR 0.93 0.94 1.02*          09/26/2023 1658 09/28/2023 1024 Anaerobic Bacterial Culture Routine [[id number]]   (Abnormal)   Tissue from Heel, Left "    Final result Component Value   Culture 4+ Bacteroides fragilis Abnormal     Susceptibilities not routinely done, refer to antibiogram to view typical susceptibility profiles    4+ Mixed Aerobic and Anaerobic danny             09/26/2023 1658 09/26/2023 2034 Gram Stain [[id number]]   (Abnormal)   Tissue from Heel, Left    Final result Component Value   Gram Stain Result 3+ Gram positive cocci Abnormal    Gram Stain Result 3+ Gram negative bacilli Abnormal    Gram Stain Result No white blood cells seen Abnormal           09/26/2023 1658 10/02/2023 1202 Tissue Aerobic Bacterial Culture Routine [[id number]]    (Abnormal)   Tissue from Heel, Left    Final result Component Value   Culture 4+ Proteus mirabilis Abnormal     Susceptibilities done on previous cultures    4+ Morganella morganii Abnormal     Susceptibilities done on previous cultures    4+ Enterococcus faecalis Abnormal     Susceptibilities done on previous cultures    3+ Enterococcus avium Abnormal     3+ Staphylococcus aureus Abnormal     Susceptibilities done on previous cultures    2+ Normal danny    4+ Pseudomonas aeruginosa Abnormal     Susceptibilities done on previous cultures    4+ Bordetella trematum Abnormal     Susceptibilities done on previous cultures    3+ Corynebacterium striatum Abnormal     Identification obtained by MALDI-TOF mass spectrometry research use only database. Test characteristics determined and verified by the [facility].  Susceptibilities not routinely done, refer to antibiogram to view typica  l susceptibility profiles       Susceptibility     Enterococcus avium     ENDY     Ampicillin <=2 ug/mL Susceptible     Gentamicin Synergy Susceptible... Susceptible 1     Vancomycin <=0.5 ug/mL Susceptible              1 No high level gentamicin resistance found - therefore combination therapy with an aminoglycoside may be indicated for serious enterococcal infections such as bacteremia and  endocarditis.            09/26/2023 1657 09/30/2023 1018 Anaerobic Bacterial Culture Routine [[id number]]   (Abnormal)   Bone Biopsy from Foot, Left    Final result Component Value   Culture 2+ Bacteroides fragilis Abnormal     Susceptibilities not routinely done, refer to antibiogram to view typical susceptibility profiles    2+ Mixed Aerobic and Anaerobic danny             09/26/2023 1657 09/26/2023 2035 Gram Stain [[id number]]   (Abnormal)   Bone Biopsy from Foot, Left    Final result Component Value   Gram Stain Result 2+ Gram positive cocci Abnormal    Gram Stain Result 1+ Gram positive bacilli Abnormal    Gram Stain Result 1+ Gram negative bacilli Abnormal    Gram Stain Result 2+ WBC seen Abnormal           09/26/2023 1657 10/02/2023 1314 Bone Biopsy Aerobic Bacterial Culture Routine [[id number]]    (Abnormal)   Bone Biopsy from Foot, Left    Final result Component Value   Culture 2+ Pseudomonas aeruginosa Abnormal     1+ Morganella morganii Abnormal     1+ Escherichia coli Abnormal     2+ Enterococcus faecalis Abnormal     2+ Enterococcus faecalis Abnormal     1+ Staphylococcus aureus MRSA Abnormal     1+ Proteus mirabilis Abnormal     2+ Bordetella trematum Abnormal     1+ Proteus mirabilis Abnormal     1+ Normal danny       Susceptibility     Pseudomonas aeruginosa Morganella morganii Escherichia coli Enterococcus faecalis (4)     ENDY ENDY ENDY ENDY     Amikacin <=2 ug/mL Susceptible           Ampicillin    Resistant 1 4 ug/mL Susceptible <=2 ug/mL Susceptible     Ampicillin/ Sulbactam   16 ug/mL Intermediate <=2 ug/mL Susceptible       Cefepime 8 ug/mL Susceptible <=1 ug/mL Susceptible <=1 ug/mL Susceptible       Ceftazidime 16 ug/mL Intermediate <=1 ug/mL Susceptible <=1 ug/mL Susceptible       Ceftriaxone   <=1 ug/mL Susceptible <=1 ug/mL Susceptible       Ciprofloxacin <=0.25 ug/mL Susceptible <=0.25 ug/mL Susceptible <=0.25 ug/mL Susceptible       Gentamicin <=1 ug/mL Susceptible <=1 ug/mL  Susceptible <=1 ug/mL Susceptible       Gentamicin Synergy       Resistant u... Resistant 2     Levofloxacin 0.25 ug/mL Susceptible <=0.12 ug/mL Susceptible <=0.12 ug/mL Susceptible       Meropenem <=0.25 ug/mL Susceptible <=0.25 ug/mL Susceptible <=0.25 ug/mL Susceptible       Piperacillin/Tazobactam 64 ug/mL Intermediate <=4 ug/mL Susceptible <=4 ug/mL Susceptible       Tobramycin <=1 ug/mL Susceptible <=1 ug/mL Susceptible <=1 ug/mL Susceptible       Trimethoprim/Sulfamethoxazole   <=1/19 ug/mL Susceptible <=1/19 ug/mL Susceptible       Vancomycin       1 ug/mL Susceptible                    1 Intrinsically Resistant   2 High level gentamicin resistance was found, and this is predictive of resistance to tobramycin and amikacin.        Susceptibility     Enterococcus faecalis (5) Staphylococcus aureus MRSA Proteus mirabilis (7) Bordetella trematum     ENDY ENDY ENDY ENDY     Amikacin       <=16 ug/mL Susceptible     Ampicillin <=2 ug/mL Susceptible   <=2 ug/mL Susceptible       Ampicillin/ Sulbactam     <=2 ug/mL Susceptible       Cefepime     <=1 ug/mL Susceptible 8.0 ug/mL Susceptible     Ceftazidime     <=1 ug/mL Susceptible 4.0 ug/mL Susceptible     Ceftriaxone     <=1 ug/mL Susceptible       Ciprofloxacin     <=0.25 ug/mL Susceptible >2 ug/mL Resistant     Clindamycin   >=4 ug/mL Resistant         Daptomycin   <=0.12 ug/mL Susceptible         Doxycycline   <=0.5 ug/mL Susceptible         Erythromycin   >=8 ug/mL Resistant         Gentamicin   <=0.5 ug/mL Susceptible <=1 ug/mL Susceptible 4.0 ug/mL Susceptible     Gentamicin Synergy Resistant u... Resistant 1           Levofloxacin     <=0.12 ug/mL Susceptible 2.0 ug/mL Susceptible     Linezolid   2 ug/mL Susceptible         Meropenem     <=0.25 ug/mL Susceptible <=1 ug/mL Susceptible     Oxacillin   >=4 ug/mL Resistant 2         Piperacillin/Tazobactam     <=4 ug/mL Susceptible <=4 ug/mL Susceptible     Tetracycline   <=1 ug/mL Susceptible         Tobramycin      <=1 ug/mL Susceptible 4.0 ug/mL Susceptible     Trimethoprim/Sulfamethoxazole   <=0.5/9.5 u... Susceptible <=1/19 ug/mL Susceptible <=2/38 ug/mL Susceptible     Vancomycin 1 ug/mL Susceptible <=0.5 ug/mL Susceptible                        1 High level gentamicin resistance was found, and this is predictive of resistance to tobramycin and amikacin.   2 Oxacillin susceptible isolates are susceptible to cephalosporins (example: cefazolin and cephalexin) and beta lactam combination agents. Oxacillin resistant isolates are resistant to these agents.        Susceptibility     Proteus mirabilis (9)     ENDY     Ampicillin <=2 ug/mL Susceptible     Ampicillin/ Sulbactam <=2 ug/mL Susceptible     Cefepime <=1 ug/mL Susceptible     Ceftazidime <=1 ug/mL Susceptible     Ceftriaxone <=1 ug/mL Susceptible     Ciprofloxacin <=0.25 ug/mL Susceptible     Gentamicin <=1 ug/mL Susceptible     Levofloxacin <=0.12 ug/mL Susceptible     Meropenem <=0.25 ug/mL Susceptible     Piperacillin/Tazobactam <=4 ug/mL Susceptible     Tobramycin <=1 ug/mL Susceptible     Trimethoprim/Sulfamethoxazole <=1/19 ug/mL Susceptible                Susceptibility Comments    Staphylococcus aureus MRSA   MRSA requires contact precautions.         09/25/2023 1620 09/30/2023 2004 Blood Culture Arm, Right [[id number]]    Blood from Arm, Right    Final result Component Value   Culture No Growth             09/25/2023 1540 09/30/2023 2004 Blood Culture Arm, Right [[id number]]   Blood from Arm, Right    Final result Component Value   Culture No Growth

## 2023-10-04 NOTE — PLAN OF CARE
Summary:  Cellulites/Osteomyelitis of left heel.     DATE & TIME: 10/04/23 1631-0710  Cognitive Concerns/ Orientation : A&OX4   BEHAVIOR & AGGRESSION TOOL COLOR: Green  CIWA SCORE: NA   ABNL VS/O2: VSS RA  MOBILITY: Ax1 stand and Pivot to BSC, not OOB this shift - Weight shifting encouraged  PAIN MANAGMENT: 7/10 BLE  pain, receiving scheduled methadone.   DIET: Mod Carb  BOWEL/BLADDER: Continent. Purewick in placed with adequate output.   ABNL LAB/BG:  Next Hep Xa at 0757.    DRAIN/DEVICES: PICC on R upper arm saline locked w/intermittent vanco and meropenem.  PIV with Hep gtt @ 1400 units/hr.   TELEMETRY RHYTHM: NA  SKIN: Multiple wounds to BLE - wound cares performed - minimal drainage  - dressing CDI; coccyx redness with open areas-meplilex in place - CDI  TESTS/PROCEDURES: LLE angio complete 10/03  D/C DAY/GOALS/PLACE: Pending  OTHER IMPORTANT INFO: To be reassessed 10/4 for wound vac.

## 2023-10-04 NOTE — PROGRESS NOTES
"[facility] Nurse Inpatient Assessment     Consulted for: 9/30 Wound diabetic foot ulcer left foot - wound vac placement. 10/2 Wound right buttocks.     Summary:   1) Left heel ulcer: Infected with osteomyelitis, recent debridement by podiatry 9/26, topical dressing applied 10/4.   2) BL legs: Chronic venous stasis with superficial healing wounds, not assessed 10/4, assessed weekly.   3) Buttock/Coccyx: Mixture of friction and moisture, not assessed 10/4, assessed weekly.   4) Left IT: Unstageable ulcer present on admission, not assessed 10/4, assessed weekly.     Patient History (according to provider note(s):      \"78 year old female admitted on 9/29/2023. She presents as a transfer from [facility] for L heel osteomyelitis \"    Assessment:      Areas visualized during today's visit: Lower extremities left     Wound location: Left heel  Last photo: 10/4/23    Wound due to: Pressure Injury and Diabetic Ulcer  Wound history/plan of care: found with vashe kerlix in place per dressing care orders   Wound base: 70 % fibrin and slough, 20 % non-granular tissue 10% bone     Palpation of the wound bed: firm and fluctuance-palpated down to bone     Drainage: moderate     Description of drainage: serosanguinous     Measurements (length x width x depth, in cm): 11  x 6  x  1.5 cm      Tunneling: N/A     Undermining: N/A  Periwound skin: Dry/scaly and Superficial erosion, nonblanchable maroon       Color:  scattered micro bleeding under tissues      Temperature: normal   Odor: none  Pain: moderate, intermittent, stabbing, and sharp  Pain interventions prior to dressing change: slow and gentle cares  and distraction  Treatment goal: Drainage control and Infection control/prevention  STATUS: improving and stable  Supplies ordered: at bedside and discussed with patient     Wound location: Right posterior LE  Last photo: 10/2/23    Wound due to: Venous Ulcer  Wound history/plan of care: Chronic non " healing ulcer, chronic edema with diabetes found covered with a mepilex  Wound base: thin white fibrin overlaying red moist nongranular tissue     Palpation of the wound bed: normal      Drainage: scant     Description of drainage: serosanguinous     Measurements (length x width x depth, in cm): 2  x 2  x  0.1 cm      Tunneling: N/A     Undermining: N/A  Periwound skin: Intact, Edematous, and Erythema- blanchable      Color: pink      Temperature: normal   Odor: none  Pain: denies , none  Pain interventions prior to dressing change: N/A  Treatment goal: Heal  and Infection control/prevention  STATUS: initial assessment  Supplies ordered: at bedside    Wound location: Left lateral LE  Last photo: 10/2/23    Wound due to: Venous Ulcer  Wound history/plan of care: Chronic non healing ulcer, chronic edema with diabetes found covered with a mepilex  Wound base: 100 % red non-granular tissue     Palpation of the wound bed: normal      Drainage: scant     Description of drainage: serosanguinous     Measurements (length x width x depth, in cm): 2  x 0.8  x  0.1 cm      Tunneling: N/A     Undermining: N/A  Periwound skin: Dry/scaly, Edematous, and residual Triad paste      Color: normal and consistent with surrounding tissue      Temperature: normal   Odor: none  Pain: denies , none  Pain interventions prior to dressing change: N/A  Treatment goal: Heal   STATUS: initial assessment  Supplies ordered: at bedside    Wound location: Coccyx/buttock  Last photo: 10/2/23    Wound due to: Friction and Incontinence Associated Dermatitis (IAD)  Wound history/plan of care: Patient with moisture trapping and chronic incontinence. Found with saturated mepilex  Wound base: Denuded tissue over coccyx and BL inner buttock with superficial scabbing and scattered areas of erythema     Palpation of the wound bed: normal      Drainage:  weepy mixed with dry and scaly     Description of drainage: serous     Measurements (length x width x depth,  in cm): Coccyx, Left buttock and Right buttock all less than 2 x 2 x 0.01cm area      Tunneling: N/A     Undermining: N/A  Periwound skin: Intact      Color: normal and consistent with surrounding tissue      Temperature: normal   Odor: none  Pain: mild, tender  Pain interventions prior to dressing change: slow and gentle cares   Treatment goal: Heal  and Decrease moisture  STATUS: initial assessment  Supplies ordered: at bedside       Treatment Plan:       Wound care  EVERY OTHER DAY      Comments: BL lower legs: Every other day and prn  1.Cleanse the area with Marisol cleanse and protect and dinah dry wipes/soft cloth.  2. Apply nickel thick layer of Triad paste (#521197) to wound bed and thin layer over reddened areas  3. Cover with  Border Dressing (#530516)          Wound care  2 TIMES DAILY        Comments: Coccyx, buttock and Left IT wound(s): BID and PRN incontinence  1.Cleanse the area with Marisol cleanse and protect and dinah dry wipes/soft cloth.  2. Apply nickel thick layer of Triad paste (#353201) to wound bed and thin layer over reddened areas  **No need to remove all paste after each incontinent episode, remove soiled paste and reapply as needed.  **If complete removal of paste is necessary use baby oil/mineral oil (Located in Pharmacy) and soft wash cloth.  Leave the brief off in bed to let the area dry as much as possible.  Use only one Covidien pad in between mattress and pt. No brief while in bed.          Wound care  EVERY MWF      Comments: Location: left heel  Care: provided by primary RN M/W/F  1. Cleanse wound with commercial wound cleanser Micorklenz (avoid vashe due to not compatible with hydrofera) soaked gauze  2. Moisten cut to fit Hydrofera Blue (#731781) with Normal saline, wring out excess so it is damp and gently pack into wound (this will remain in wound as primary dressing)  3. Apply Cavilon No Sting Skin Prep (#235291) to periwound and allow to dry.  4.  Cover wound with gauze  sponge or ABD. Secure with Kerlix and tape  5. Time and date dressing change  *Float heels at all times with pillows or use Prevalon boots if not maintaining positioning.          Orders: Reviewed and Updated    RECOMMEND PRIMARY TEAM ORDER: None, at this time  Education provided: plan of care, wound progress, Moisture management, Hygiene, and Off-loading pressure  Discussed plan of care with: Patient, Family, and Nurse, RAMSES PARIKH   [facility] nurse follow-up plan: weekly  Notify WO if wound(s) deteriorate.  Nursing to notify the Provider(s) and re-consult the [facility] Nurse if new skin concern.    DATA:     Current support surface: Standard  Low air loss (BRISA pump, Isolibrium, Pulsate, skin guard, etc)  Containment of urine/stool: Incontinence Protocol, Incontinent pad in bed, and Purewick external catheter   BMI: Body mass index is 41.88 kg/m .   Active diet order: Orders Placed This Encounter      Moderate Consistent Carb (60 g CHO per Meal) Diet     Output: I/O last 3 completed shifts:  In: 20 [P.O.:20]  Out: 800 [Urine:800]     Labs:   Recent Labs   Lab 10/04/23  0633 10/03/23  0637   HGB  --  9.3*   INR 1.39* 1.31*   WBC  --  6.5       Pressure injury risk assessment:   Sensory Perception: 4-->no impairment  Moisture: 3-->occasionally moist  Activity: 2-->chairfast  Mobility: 2-->very limited  Nutrition: 3-->adequate  Friction and Shear: 2-->potential problem  Suresh Score: 16    Gaby CWOCN   1st choice: Securely message with [employer] ([facility] [employer] [facility])   (2nd option: [facility] Office Phone [phone], messages checked periodically Mon-Fri 8a-4p)

## 2023-10-04 NOTE — PLAN OF CARE
Goal Outcome Evaluation:  Cognitive Concerns/ Orientation : A&OX4. Frustrated that her left foot needs to be amputated   BEHAVIOR & AGGRESSION TOOL COLOR: Green  CIWA SCORE: NA   ABNL VS/O2: VSS, on room air, afebrile  MOBILITY: Ax1 stand and Pivot to chair with PT today, weight shifting   PAIN MANAGMENT: denies pain, receiving scheduled methadone, though did complain of pain with dressing changes.   DIET: Mod Carb-fair  BOWEL/BLADDER: Continent. Purewick in placed with adequate output.   ABNL LAB/BG:  Next Hep Xa at 0757.  , 162  DRAIN/DEVICES: PICC on R upper arm saline locked w/intermittent vanco and meropenem.  PIV with Hep gtt @ 1400 units/hr.   TELEMETRY RHYTHM: NA  SKIN: Multiple wounds to BLE - wound cares performed - minimal drainage  - dressing CDI; coccyx redness with open areas-meplilex in place - CDI  TESTS/PROCEDURES: LLE angio complete 10/03  D/C DAY/GOALS/PLACE: Pending  OTHER IMPORTANT INFO: To be reassessed 10/4 for wound vac. Chair alarm on at shift change-up in the recliner with legs elevated talking to the SW.

## 2023-10-04 NOTE — PLAN OF CARE
Goal Outcome Evaluation:    Summary:  Cellulites/Osteomyelitis of left heel.     DATE & TIME: 10/03/23 2455-9397  Cognitive Concerns/ Orientation : A&OX4   BEHAVIOR & AGGRESSION TOOL COLOR: Green  CIWA SCORE: NA   ABNL VS/O2: VSS RA  MOBILITY: Ax1 stand and Pivot to BSC, not OOB this shift - Weight shifting encouraged  PAIN MANAGMENT: 5/10 BLE  pain, receiving scheduled methadone.   DIET: Mod Carb  BOWEL/BLADDER: Continent. Purewick in placed with adequate output.   ABNL LAB/BG:  Next Hep Xa at 0600.    DRAIN/DEVICES: PICC on R upper arm saline locked w/intermittent vanco and meropenem.  PIV with Hep gtt @ 1100 units/hr.   TELEMETRY RHYTHM: NA  SKIN: Multiple wounds to BLE - wound cares performed - minimal drainage  - dressing CDI; coccyx redness with open areas-meplilex in place - CDI  TESTS/PROCEDURES: LLE angio scheduled for 10/3  D/C DAY/GOALS/PLACE: Pending  OTHER IMPORTANT INFO: To be reassessed 10/4 for wound vac - Hep gtt restarted at 1830 - Pt tearful following consult with physician - pt spent most of shift conversing with family on phone - markedly pleasant. Wound cares performed

## 2023-10-04 NOTE — PROGRESS NOTES
Vascular surgery note    I discussed the findings of the left leg angiogram with Pat today, and separately with her daughter, Lu, by telephone.  She has essentially normal blood flow to the left foot.  This means there is no opportunity to improve blood flow and we cannot expect any improvement in wound healing capacity of the left heel.  The option of below-knee amputation was discussed, including the risk of further wound complications as a result of her venous hypertension, chronic edema, and lipodermatosclerosis of the left calf.  She is still deciding whether or not she would like to proceed with amputation.  All questions of hers and her daughters were answered to their satisfaction.  Vascular surgery will sign off.  Please contact our team if Pat would like to proceed with amputation during this admission.  Phone number for vascular surgery clinic is provided and discharge instructions for [facility] if she would like to proceed with amputation after she discharges.    D/w Dr. Otto.    Torey Alvarado MD

## 2023-10-05 ENCOUNTER — APPOINTMENT (OUTPATIENT)
Dept: PHYSICAL THERAPY | Facility: CLINIC | Age: 78
DRG: 617 | End: 2023-10-05
Attending: HOSPITALIST
Payer: COMMERCIAL

## 2023-10-05 LAB
CREAT SERPL-MCNC: 0.82 MG/DL (ref 0.51–0.95)
EGFRCR SERPLBLD CKD-EPI 2021: 73 ML/MIN/1.73M2
GLUCOSE BLDC GLUCOMTR-MCNC: 120 MG/DL (ref 70–99)
GLUCOSE BLDC GLUCOMTR-MCNC: 135 MG/DL (ref 70–99)
GLUCOSE BLDC GLUCOMTR-MCNC: 151 MG/DL (ref 70–99)
GLUCOSE BLDC GLUCOMTR-MCNC: 181 MG/DL (ref 70–99)
GLUCOSE BLDC GLUCOMTR-MCNC: 99 MG/DL (ref 70–99)
INR PPP: 1.29 (ref 0.85–1.15)
UFH PPP CHRO-ACNC: 0.48 IU/ML

## 2023-10-05 PROCEDURE — 97110 THERAPEUTIC EXERCISES: CPT | Mod: GP

## 2023-10-05 PROCEDURE — 250N000011 HC RX IP 250 OP 636: Performed by: INTERNAL MEDICINE

## 2023-10-05 PROCEDURE — 99232 SBSQ HOSP IP/OBS MODERATE 35: CPT | Performed by: PHYSICIAN ASSISTANT

## 2023-10-05 PROCEDURE — 250N000013 HC RX MED GY IP 250 OP 250 PS 637: Performed by: INTERNAL MEDICINE

## 2023-10-05 PROCEDURE — 97530 THERAPEUTIC ACTIVITIES: CPT | Mod: GP

## 2023-10-05 PROCEDURE — 250N000011 HC RX IP 250 OP 636: Performed by: HOSPITALIST

## 2023-10-05 PROCEDURE — 120N000001 HC R&B MED SURG/OB

## 2023-10-05 PROCEDURE — 85520 HEPARIN ASSAY: CPT | Performed by: INTERNAL MEDICINE

## 2023-10-05 PROCEDURE — 250N000013 HC RX MED GY IP 250 OP 250 PS 637: Performed by: HOSPITALIST

## 2023-10-05 PROCEDURE — 82565 ASSAY OF CREATININE: CPT | Performed by: HOSPITALIST

## 2023-10-05 PROCEDURE — 250N000011 HC RX IP 250 OP 636: Mod: JZ | Performed by: HOSPITALIST

## 2023-10-05 PROCEDURE — 99233 SBSQ HOSP IP/OBS HIGH 50: CPT | Performed by: INTERNAL MEDICINE

## 2023-10-05 PROCEDURE — 258N000003 HC RX IP 258 OP 636: Performed by: HOSPITALIST

## 2023-10-05 PROCEDURE — 85610 PROTHROMBIN TIME: CPT | Performed by: INTERNAL MEDICINE

## 2023-10-05 RX ORDER — HYDROMORPHONE HYDROCHLORIDE 2 MG/1
2 TABLET ORAL EVERY 4 HOURS PRN
Status: DISCONTINUED | OUTPATIENT
Start: 2023-10-05 | End: 2023-10-10

## 2023-10-05 RX ADMIN — METHADONE HYDROCHLORIDE 5 MG: 5 TABLET ORAL at 07:07

## 2023-10-05 RX ADMIN — HEPARIN SODIUM 1400 UNITS/HR: 10000 INJECTION, SOLUTION INTRAVENOUS at 03:06

## 2023-10-05 RX ADMIN — MEROPENEM 500 MG: 500 INJECTION, POWDER, FOR SOLUTION INTRAVENOUS at 07:07

## 2023-10-05 RX ADMIN — HEPARIN SODIUM 1400 UNITS/HR: 10000 INJECTION, SOLUTION INTRAVENOUS at 23:26

## 2023-10-05 RX ADMIN — METHADONE HYDROCHLORIDE 10 MG: 10 TABLET ORAL at 17:39

## 2023-10-05 RX ADMIN — INSULIN DEGLUDEC 15 UNITS: 100 INJECTION, SOLUTION SUBCUTANEOUS at 21:11

## 2023-10-05 RX ADMIN — INSULIN ASPART 1 UNITS: 100 INJECTION, SOLUTION INTRAVENOUS; SUBCUTANEOUS at 17:41

## 2023-10-05 RX ADMIN — MEROPENEM 500 MG: 500 INJECTION, POWDER, FOR SOLUTION INTRAVENOUS at 00:52

## 2023-10-05 RX ADMIN — MICONAZOLE NITRATE: 2 POWDER TOPICAL at 19:47

## 2023-10-05 RX ADMIN — MEROPENEM 500 MG: 500 INJECTION, POWDER, FOR SOLUTION INTRAVENOUS at 17:38

## 2023-10-05 RX ADMIN — METHADONE HYDROCHLORIDE 5 MG: 5 TABLET ORAL at 21:09

## 2023-10-05 RX ADMIN — ATORVASTATIN CALCIUM 40 MG: 40 TABLET, FILM COATED ORAL at 19:42

## 2023-10-05 RX ADMIN — MEROPENEM 500 MG: 500 INJECTION, POWDER, FOR SOLUTION INTRAVENOUS at 23:26

## 2023-10-05 RX ADMIN — MICONAZOLE NITRATE: 2 POWDER TOPICAL at 08:29

## 2023-10-05 RX ADMIN — METHADONE HYDROCHLORIDE 5 MG: 5 TABLET ORAL at 13:12

## 2023-10-05 RX ADMIN — MEROPENEM 500 MG: 500 INJECTION, POWDER, FOR SOLUTION INTRAVENOUS at 13:12

## 2023-10-05 RX ADMIN — ASPIRIN 81 MG: 81 TABLET, COATED ORAL at 08:29

## 2023-10-05 RX ADMIN — VANCOMYCIN HYDROCHLORIDE 1500 MG: 10 INJECTION, POWDER, LYOPHILIZED, FOR SOLUTION INTRAVENOUS at 19:42

## 2023-10-05 ASSESSMENT — ACTIVITIES OF DAILY LIVING (ADL)
ADLS_ACUITY_SCORE: 34
ADLS_ACUITY_SCORE: 34
ADLS_ACUITY_SCORE: 30
ADLS_ACUITY_SCORE: 34
ADLS_ACUITY_SCORE: 30
ADLS_ACUITY_SCORE: 34
ADLS_ACUITY_SCORE: 30
ADLS_ACUITY_SCORE: 34

## 2023-10-05 NOTE — PLAN OF CARE
Goal Outcome Evaluation:         Summary:  Cellulites/Osteomyelitis of left heel.     DATE & TIME: 10/4 2312-0050  Cognitive Concerns/ Orientation : A&OX4. Frustrated that her left foot needs to be amputated   BEHAVIOR & AGGRESSION TOOL COLOR: Green  CIWA SCORE: NA   ABNL VS/O2: VSS, on room air, afebrile  MOBILITY: Ax1 stand and Pivot -  PAIN MANAGMENT: only c/o pain while pivoting, -  on scheduled methadone,    DIET: Mod Carb-good appetite   BOWEL/BLADDER: Continent. Purewick in placed with adequate output.   ABNL LAB/BG:  hep 10A  recheck this morning   DRAIN/DEVICES: PICC on R upper arm saline locked. Left   TELEMETRY RHYTHM: NA  SKIN: Multiple wounds to BLE - dressings CDI; wounds to coccyx and buttocks wound care performed yesterday  TESTS/PROCEDURES: LLE angio complete on 10/03  D/C DAY/GOALS/PLACE: Pending  OTHER IMPORTANT INFO: Vasc signed off..

## 2023-10-05 NOTE — PLAN OF CARE
Goal Outcome Evaluation:         Shift: Thursday 10/5/2023 - 07:00-15:30    Orientation: A&Ox4    Vitals/Tele: VSS on RA ex hypertension at times    IV Access/drains: PIV left hand infusing heparin gtt at 1400 Units/hr; PICC line single-lumen to right arm, saline locked    Diet: mod carb    Mobility: Ax1 pivot to bedside commode    GI/: incontinent of bladder -- Purewick changed and in place; last BM 9/29 per patient and previous charting    Wound/Skin:     Consults: no new consults placed this shift -- Vascular Surgery following, Infectious Disease following peripherally    Discharge Plan: TCU with placement pending      See Flow sheets for assessment

## 2023-10-05 NOTE — PROGRESS NOTES
[facility]    Medicine Progress Note - Hospitalist Service    Date of Admission:  9/29/2023    Assessment & Plan   Lindapancho Loredo is a 78 year old female admitted on 9/29/2023. She presents as a transfer from [facility] for L heel osteomyelitis     L foot decubitus ulcer status debridement by podiatry on 9/26 with multiple organisms       L heel osteomyelitis       L foot cellulitis       Chronic venous stasis       MRSA positive  -brought in 9/23 after welfare check by police found to have L leg swelling and erythema  -Hx venous stasis ulcers. Presents with L heel decubitus ulcer with osteo.   -Wound with multiple organisms incl pseudomonas, morganella, e faecalis, bordetella, proteus status underwent debridement 9/26 by podiatry.   -Blood cultures done on 9/25/2023 have been negative so far  -ALMA w/ poor flow, podiatry at outside hospital requested transfer where there are vascular services.  -On presentation to the floor patient was mildly hypertensive and was given IV fluids with improvement in blood pressure and she is asymptomatic.    -Patient is currently continued on vancomycin and meropenem as per prior recommendations and status post PICC line placement on 9/27 and infectious disease, podiatry and vascular surgery team has been consulted    Vascular surgery consulted and has been following - discussed in detail with pt and her daughter (10/4/23) .  Left angiogram with normal blood flow to the foot.    The option of below the knee amputation discussed with her and all questions answered.  She is still considering this with her daughter (I was able to talk with the daughter on speaker phone while in the room rounding this morning and she states that all of her questions were answered)    Addendum - 1330 - trying to get a hold of vascular surgery this afternoon as she has indicated to ID that she has decided that she does want to pursue amputation procedure    Infectious  disease following - remaining on IV meropenem and IV vanco until BKA, then continue IV abx until 24 hours post-amputation.    Infectious disease also told pt and family that this type of chronic osteo s difficult to treat even with prolonged abx treatment and non-weightbearing on clinical rounds earlier this week.     2.  Acute on top of chronic pain syndrome    - continue methadone 10 mg daily midday and 5 mg TID  Will discontinue IV dilaudid ordered (has not been using)    Small dose po dilaudid prn for breakthrough pain   Suspect that she may have difficulty with post-op pain control when she undergoes BKA - will need to consider pain team consult pending on her post-op course    PDMP reviewed - 9/15 - 40 tabs of oxy/apap and 150 tabls of methadone 5mg filled    3. UTI with mixed organisms - culture positive E faecalis, Staph aureus and E. Coli  -UA done at outside hospital on 9/23 showed above and patient is currently on broad-spectrum antibiotics including vancomycin and imipenem  -She is already on broad-spectrum antibiotics and continue the same.     4. DM II  [PTA degludec 35 units at HS, metformin 2000 mg daily, ozempic]  A1c 7.0 9/23/23. Has been on insulin pump in the past but this was changed to tresiba 8/2023.   -We will continue to hold metformin, Ozempic   -We will continue with  long-acting insulin at reduced dose along with sliding scale insulin and monitor her sugar this morning is stable     10/5/23 - having somewhat variable blood sugars this am (120-99)  Continue with a slightly decreased dose of tresiba of 15 units at bedtime    5. Atrial fibrillation  HTN  HLD  [PTA- warfarin, simvastatin 20 mg daily, lisinopril 5 mg daily]  -Continue with statin, warfarin currently is on hold - continue heparin drip.     6. CKD III  -Baseline creatinine 1-1.2.   -Stable, monitor occasionally, poorly possibly obtained creatinine following angiogram.  -And is currently stable     7. Anemia  -Hgb at 9.3,  "baseline recent 9-10.       8. Failure to thrive  As above, called by police for welfare check. ? Safety at home. SW had seen at [facility].   -Social work team has been consulted, tentative plan is possible discharge to TCU depending on surgical plan.     9. BERLIN  -We will continue home cpap        DVT Prophylaxis: heparin drip  Code Status: Full Code        Medical Decision Making       50 MINUTES SPENT BY ME on the date of service doing chart review, history, exam, documentation & further activities per the note.        PPE Worn:  Mask, gloves     Diet: Moderate Consistent Carb (60 g CHO per Meal) Diet    DVT Prophylaxis: heparin gtt  Braga Catheter: Not present  Lines: PRESENT      PICC 09/27/23 Single Lumen Right Basilic antibiotics-Site Assessment: WDL      Cardiac Monitoring: None  Code Status: Full Code      Clinically Significant Risk Factors               # Coagulation Defect: INR = 1.39 (Ref range: 0.85 - 1.15) and/or PTT = N/A, will monitor for bleeding    # Hypertension: Noted on problem list       # DMII: A1C = 7.0 % (Ref range: <5.7 %) within past 6 months   # Severe Obesity: Estimated body mass index is 41.88 kg/m  as calculated from the following:    Height as of this encounter: 1.676 m (5' 6\").    Weight as of this encounter: 117.7 kg (259 lb 7.7 oz).             Disposition Plan     Expected Discharge Date: 10/05/2023      Destination: inpatient rehabilitation facility            Arabella Cyr DO  Hospitalist Service  [facility]  Securely message with [employer] (more info)  Text page via [employer] Paging/Directory   ______________________________________________________________________    Interval History   Tearful and frustrated.  \"You don't understand unless you are laying here\".  \"You are just going to walk away now, too\".  \"It is not your fault\".  Voices frustration at how this happened after years of wound care and appointments.  Not sure she is ready for BKA but also " not sure what her options are; wants to talk more in private with her daughter.      Physical Exam   Vital Signs: Temp: 97.6  F (36.4  C) Temp src: Oral BP: (!) 140/80 Pulse: 96   Resp: 16 SpO2: 98 % O2 Device: None (Room air)    Weight: 259 lbs 7.7 oz    GEN:  Alert,awake.   HEENT:  Normocephalic/atraumatic, no scleral icterus, no nasal discharge, mouth moist.  CV:  Regular rate and rhythm  LUNGS:  Clear to auscultation ant bilaterally without rales/rhonchi/wheezing/retractions.  Symmetric chest rise on inhalation noted.  EXT:  right leg with chronic venous stasis changes and mild,pitting edema.  Left foot wrapped with clean, dry gauze bandage.   PSYCH: Tearful, frustrated, does not maintain direct eye contact     Medications    heparin 1,400 Units/hr (10/05/23 0306)    - MEDICATION INSTRUCTIONS -        aspirin  81 mg Oral Daily    atorvastatin  40 mg Oral QPM    insulin aspart  1-7 Units Subcutaneous TID AC    insulin aspart  1-5 Units Subcutaneous At Bedtime    insulin degludec  15 Units Subcutaneous At Bedtime    meropenem  500 mg Intravenous Q6H    methadone  10 mg Oral Daily    methadone  5 mg Oral TID    miconazole   Topical BID    sodium chloride (PF)  10-40 mL Intracatheter Q8H    vancomycin  1,500 mg Intravenous Q24H       Data     Labs and Imaging results below reviewed today.  Recent Labs   Lab 10/03/23  0637 10/02/23  0653 10/01/23  0604   WBC 6.5 6.0 6.8   HGB 9.3* 9.0* 9.1*   HCT 29.5* 28.3* 27.8*   MCV 94 94 93    161 150     Recent Labs   Lab 10/05/23  1236 10/05/23  0814 10/05/23  0655 10/05/23  0216 10/04/23  0852 10/04/23  0633 10/03/23  0847 10/03/23  0637 10/02/23  0819 10/02/23  0653 10/01/23  0802 10/01/23  0604 09/30/23  1115 09/30/23  0614   NA  --   --   --   --   --   --   --   --   --  138  --  137  --  137   POTASSIUM  --   --   --   --   --   --   --   --   --  3.8  --  3.6  --  3.6   CHLORIDE  --   --   --   --   --   --   --   --   --  101  --  99  --  98   CO2  --   --   --    --   --   --   --   --   --  30*  --  30*  --  30*   ANIONGAP  --   --   --   --   --   --   --   --   --  7  --  8  --  9   * 99  --  120*   < >  --    < >  --    < > 153*   < > 149*   < > 170*   BUN  --   --   --   --   --   --   --   --   --  24.3*  --  27.3*  --  30.5*   CR  --   --  0.82  --   --  0.93  --  0.94  --  1.02*  --  1.07*  --  1.15*   GFRESTIMATED  --   --  73  --   --  63  --  62  --  56*  --  53*  --  49*   ZAKIA  --   --   --   --   --   --   --   --   --  9.3  --  9.1  --  9.2    < > = values in this interval not displayed.     Recent Labs   Lab 10/05/23  1236 10/05/23  0814 10/05/23  0655 10/05/23  0216 10/04/23  0852 10/04/23  0633 10/03/23  0847 10/03/23  0637 10/02/23  0819 10/02/23  0653 10/01/23  0802 10/01/23  0604 09/30/23  1115 09/30/23  0614   NA  --   --   --   --   --   --   --   --   --  138  --  137  --  137   POTASSIUM  --   --   --   --   --   --   --   --   --  3.8  --  3.6  --  3.6   CHLORIDE  --   --   --   --   --   --   --   --   --  101  --  99  --  98   CO2  --   --   --   --   --   --   --   --   --  30*  --  30*  --  30*   ANIONGAP  --   --   --   --   --   --   --   --   --  7  --  8  --  9   * 99  --  120*   < >  --    < >  --    < > 153*   < > 149*   < > 170*   BUN  --   --   --   --   --   --   --   --   --  24.3*  --  27.3*  --  30.5*   CR  --   --  0.82  --   --  0.93  --  0.94  --  1.02*  --  1.07*  --  1.15*   GFRESTIMATED  --   --  73  --   --  63  --  62  --  56*  --  53*  --  49*   ZAKIA  --   --   --   --   --   --   --   --   --  9.3  --  9.1  --  9.2    < > = values in this interval not displayed.     Recent Labs   Lab 10/05/23  0655 10/04/23  0633 10/03/23  0637   INR 1.29* 1.39* 1.31*     No results for input(s): TSH in the last 168 hours.  No results for input(s): COLOR, APPEARANCE, URINEGLC, URINEBILI, URINEKETONE, SG, UBLD, URINEPH, PROTEIN, UROBILINOGEN, NITRITE, LEUKEST, RBCU, WBCU in the last 168 hours.    No results found for this or any  previous visit (from the past 24 hour(s)).

## 2023-10-05 NOTE — PLAN OF CARE
Goal Outcome Evaluation:    Summary:  Cellulites/Osteomyelitis of left heel.     DATE & TIME: 10/4 evenings   Cognitive Concerns/ Orientation : A&OX4.  BEHAVIOR & AGGRESSION TOOL COLOR: Green  CIWA SCORE: NA   ABNL VS/O2: VSS, on room air, afebrile  MOBILITY: Ax1 stand and Pivot - ambulated to bed from chair  - in chair  most  of  shift   PAIN MANAGMENT: only c/o pain while pivoting, - scheduled methadone,    DIET: Mod Carb-good appetite   BOWEL/BLADDER: Continent. Purewick in placed with adequate output.   ABNL LAB/BG:  hep 10A  drawn and sent  DRAIN/DEVICES: PICC on R upper arm saline locked. Left   TELEMETRY RHYTHM: NA  SKIN: Multiple wounds to BLE - dressings CDI; wounds to coccyx and buttocks wound care performed-TESTS/PROCEDURES: LLE angio complete 10/03  D/C DAY/GOALS/PLACE: Pending  OTHER IMPORTANT INFO: Vasc signed off..

## 2023-10-05 NOTE — PROGRESS NOTES
[facility]  Infectious Disease Progress Note          Assessment and Plan:     Assessment & Plan  Linda Loredo is a 78 year old who was admitted on 9/29/2023.      Impression:   1. 78-year-old female, underlying venous stasis, probable peripheral vascular disease and a chronic left heel wound, obvious ongoing infection and clinically obvious osteomyelitis now proven by biopsy.  Wound and bone culture growing numerous organisms, doubt all these are involved in the osteo but all coming from the bone culture, almost no chance of salvage of limb long-term certain to have a long-term wound regardless of approach  2.  Angiogram LLE shows adequate blood flow down leg to foot with no significant PAD.   3.  Chronic venous stasis with chronic edema  4.  MRSA colonization among the wound culture as well  5.  Mild chronic kidney disease  6.  Clinically stable. Pain under control.      RECOMMENDATIONS:  Discussed in detail with patient and with daughter earlier, no likely cure of this type of chronic osteo of the heel even with non-weightbearing and prolonged antibiotics, likely headed towards a BKA if our goal is cure. Patient has decided she now wants to pursue BKA. Timing of this per Vascular.   Continue meropenem and Vanco for now while awaiting BKA. Then continue IV antibiotics for 24 hours after BKA and then stop.   ID will follow peripherally. Please call with questions.         Lor Kowalski PA-C             Interval History:     no new complaints and doing well; no cp, sob, n/v/d, or abd pain. Micro same, pain OK. Decided she wants to move forward with a BKA now.               Medications:      aspirin  81 mg Oral Daily    atorvastatin  40 mg Oral QPM    insulin aspart  1-7 Units Subcutaneous TID AC    insulin aspart  1-5 Units Subcutaneous At Bedtime    insulin degludec  15 Units Subcutaneous At Bedtime    meropenem  500 mg Intravenous Q6H    methadone  10 mg Oral Daily    methadone   "5 mg Oral TID    sodium chloride (PF)  10-40 mL Intracatheter Q8H    vancomycin  1,250 mg Intravenous Q24H                  Physical Exam:   Blood pressure 134/81, pulse 71, temperature 97.9  F (36.6  C), temperature source Oral, resp. rate 14, height 1.676 m (5' 6\"), weight 117.7 kg (259 lb 7.7 oz), SpO2 91 %.  Wt Readings from Last 2 Encounters:   10/03/23 117.7 kg (259 lb 7.7 oz)   09/26/23 120.2 kg (265 lb)     Vital Signs with Ranges  Temp:  [97.7  F (36.5  C)-98.5  F (36.9  C)] 97.9  F (36.6  C)  Pulse:  [] 71  Resp:  [6-20] 14  BP: (104-146)/(59-99) 134/81  SpO2:  [89 %-98 %] 91 %    Constitutional: Awake, alert, cooperative, no apparent distress. Tearful     Lungs: Clear to auscultation bilaterally, no crackles or wheezing   Cardiovascular: Regular rate and rhythm, normal S1 and S2, and no murmur noted   Abdomen: Normal bowel sounds, soft, non-distended, non-tender   Skin: No rashes, no cyanosis, some  edema, some cellulitis,  foot wrapped. Lymphedema and venous stasis changes in legs.    Other:           Data:   All microbiology laboratory data reviewed.  Recent Labs   Lab Test 10/03/23  0637 10/02/23  0653 10/01/23  0604   WBC 6.5 6.0 6.8   HGB 9.3* 9.0* 9.1*   HCT 29.5* 28.3* 27.8*   MCV 94 94 93    161 150     Recent Labs   Lab Test 10/04/23  0633 10/03/23  0637 10/02/23  0653   CR 0.93 0.94 1.02*          09/26/2023 1658 09/28/2023 1024 Anaerobic Bacterial Culture Routine [[id number]]   (Abnormal)   Tissue from Heel, Left    Final result Component Value   Culture 4+ Bacteroides fragilis Abnormal     Susceptibilities not routinely done, refer to antibiogram to view typical susceptibility profiles    4+ Mixed Aerobic and Anaerobic danny             09/26/2023 1658 09/26/2023 2034 Gram Stain [[id number]]   (Abnormal)   Tissue from Heel, Left    Final result Component Value   Gram Stain Result 3+ Gram positive cocci Abnormal    Gram Stain Result 3+ Gram negative bacilli Abnormal    Gram " Stain Result No white blood cells seen Abnormal           09/26/2023 1658 10/02/2023 1202 Tissue Aerobic Bacterial Culture Routine [[id number]]    (Abnormal)   Tissue from Heel, Left    Final result Component Value   Culture 4+ Proteus mirabilis Abnormal     Susceptibilities done on previous cultures    4+ Morganella morganii Abnormal     Susceptibilities done on previous cultures    4+ Enterococcus faecalis Abnormal     Susceptibilities done on previous cultures    3+ Enterococcus avium Abnormal     3+ Staphylococcus aureus Abnormal     Susceptibilities done on previous cultures    2+ Normal danny    4+ Pseudomonas aeruginosa Abnormal     Susceptibilities done on previous cultures    4+ Bordetella trematum Abnormal     Susceptibilities done on previous cultures    3+ Corynebacterium striatum Abnormal     Identification obtained by MALDI-TOF mass spectrometry research use only database. Test characteristics determined and verified by the Infectious Diseases Diagnostic Laboratory.  Susceptibilities not routinely done, refer to antibiogram to view typica  l susceptibility profiles       Susceptibility     Enterococcus avium     ENDY     Ampicillin <=2 ug/mL Susceptible     Gentamicin Synergy Susceptible... Susceptible 1     Vancomycin <=0.5 ug/mL Susceptible              1 No high level gentamicin resistance found - therefore combination therapy with an aminoglycoside may be indicated for serious enterococcal infections such as bacteremia and endocarditis.            09/26/2023 1657 09/30/2023 1018 Anaerobic Bacterial Culture Routine [[id number]]   (Abnormal)   Bone Biopsy from Foot, Left    Final result Component Value   Culture 2+ Bacteroides fragilis Abnormal     Susceptibilities not routinely done, refer to antibiogram to view typical susceptibility profiles    2+ Mixed Aerobic and Anaerobic danny             09/26/2023 1657 09/26/2023 2035 Gram Stain [[id number]]   (Abnormal)   Bone Biopsy from Foot, Left     Final result Component Value   Gram Stain Result 2+ Gram positive cocci Abnormal    Gram Stain Result 1+ Gram positive bacilli Abnormal    Gram Stain Result 1+ Gram negative bacilli Abnormal    Gram Stain Result 2+ WBC seen Abnormal           09/26/2023 1657 10/02/2023 1314 Bone Biopsy Aerobic Bacterial Culture Routine [[id number]]    (Abnormal)   Bone Biopsy from Foot, Left    Final result Component Value   Culture 2+ Pseudomonas aeruginosa Abnormal     1+ Morganella morganii Abnormal     1+ Escherichia coli Abnormal     2+ Enterococcus faecalis Abnormal     2+ Enterococcus faecalis Abnormal     1+ Staphylococcus aureus MRSA Abnormal     1+ Proteus mirabilis Abnormal     2+ Bordetella trematum Abnormal     1+ Proteus mirabilis Abnormal     1+ Normal danny       Susceptibility     Pseudomonas aeruginosa Morganella morganii Escherichia coli Enterococcus faecalis (4)     ENDY ENDY ENDY ENDY     Amikacin <=2 ug/mL Susceptible           Ampicillin    Resistant 1 4 ug/mL Susceptible <=2 ug/mL Susceptible     Ampicillin/ Sulbactam   16 ug/mL Intermediate <=2 ug/mL Susceptible       Cefepime 8 ug/mL Susceptible <=1 ug/mL Susceptible <=1 ug/mL Susceptible       Ceftazidime 16 ug/mL Intermediate <=1 ug/mL Susceptible <=1 ug/mL Susceptible       Ceftriaxone   <=1 ug/mL Susceptible <=1 ug/mL Susceptible       Ciprofloxacin <=0.25 ug/mL Susceptible <=0.25 ug/mL Susceptible <=0.25 ug/mL Susceptible       Gentamicin <=1 ug/mL Susceptible <=1 ug/mL Susceptible <=1 ug/mL Susceptible       Gentamicin Synergy       Resistant u... Resistant 2     Levofloxacin 0.25 ug/mL Susceptible <=0.12 ug/mL Susceptible <=0.12 ug/mL Susceptible       Meropenem <=0.25 ug/mL Susceptible <=0.25 ug/mL Susceptible <=0.25 ug/mL Susceptible       Piperacillin/Tazobactam 64 ug/mL Intermediate <=4 ug/mL Susceptible <=4 ug/mL Susceptible       Tobramycin <=1 ug/mL Susceptible <=1 ug/mL Susceptible <=1 ug/mL Susceptible       Trimethoprim/Sulfamethoxazole    <=1/19 ug/mL Susceptible <=1/19 ug/mL Susceptible       Vancomycin       1 ug/mL Susceptible                    1 Intrinsically Resistant   2 High level gentamicin resistance was found, and this is predictive of resistance to tobramycin and amikacin.        Susceptibility     Enterococcus faecalis (5) Staphylococcus aureus MRSA Proteus mirabilis (7) Bordetella trematum     ENDY ENDY ENDY ENDY     Amikacin       <=16 ug/mL Susceptible     Ampicillin <=2 ug/mL Susceptible   <=2 ug/mL Susceptible       Ampicillin/ Sulbactam     <=2 ug/mL Susceptible       Cefepime     <=1 ug/mL Susceptible 8.0 ug/mL Susceptible     Ceftazidime     <=1 ug/mL Susceptible 4.0 ug/mL Susceptible     Ceftriaxone     <=1 ug/mL Susceptible       Ciprofloxacin     <=0.25 ug/mL Susceptible >2 ug/mL Resistant     Clindamycin   >=4 ug/mL Resistant         Daptomycin   <=0.12 ug/mL Susceptible         Doxycycline   <=0.5 ug/mL Susceptible         Erythromycin   >=8 ug/mL Resistant         Gentamicin   <=0.5 ug/mL Susceptible <=1 ug/mL Susceptible 4.0 ug/mL Susceptible     Gentamicin Synergy Resistant u... Resistant 1           Levofloxacin     <=0.12 ug/mL Susceptible 2.0 ug/mL Susceptible     Linezolid   2 ug/mL Susceptible         Meropenem     <=0.25 ug/mL Susceptible <=1 ug/mL Susceptible     Oxacillin   >=4 ug/mL Resistant 2         Piperacillin/Tazobactam     <=4 ug/mL Susceptible <=4 ug/mL Susceptible     Tetracycline   <=1 ug/mL Susceptible         Tobramycin     <=1 ug/mL Susceptible 4.0 ug/mL Susceptible     Trimethoprim/Sulfamethoxazole   <=0.5/9.5 u... Susceptible <=1/19 ug/mL Susceptible <=2/38 ug/mL Susceptible     Vancomycin 1 ug/mL Susceptible <=0.5 ug/mL Susceptible                        1 High level gentamicin resistance was found, and this is predictive of resistance to tobramycin and amikacin.   2 Oxacillin susceptible isolates are susceptible to cephalosporins (example: cefazolin and cephalexin) and beta lactam combination agents.  Oxacillin resistant isolates are resistant to these agents.        Susceptibility     Proteus mirabilis (9)     ENDY     Ampicillin <=2 ug/mL Susceptible     Ampicillin/ Sulbactam <=2 ug/mL Susceptible     Cefepime <=1 ug/mL Susceptible     Ceftazidime <=1 ug/mL Susceptible     Ceftriaxone <=1 ug/mL Susceptible     Ciprofloxacin <=0.25 ug/mL Susceptible     Gentamicin <=1 ug/mL Susceptible     Levofloxacin <=0.12 ug/mL Susceptible     Meropenem <=0.25 ug/mL Susceptible     Piperacillin/Tazobactam <=4 ug/mL Susceptible     Tobramycin <=1 ug/mL Susceptible     Trimethoprim/Sulfamethoxazole <=1/19 ug/mL Susceptible                Susceptibility Comments    Staphylococcus aureus MRSA   MRSA requires contact precautions.         09/25/2023 1620 09/30/2023 2004 Blood Culture Arm, Right [[id number]]    Blood from Arm, Right    Final result Component Value   Culture No Growth             09/25/2023 1540 09/30/2023 2004 Blood Culture Arm, Right [[id number]]   Blood from Arm, Right    Final result Component Value   Culture No Growth               O-L Flap Text: The defect edges were debeveled with a #15 scalpel blade.  Given the location of the defect, shape of the defect and the proximity to free margins an O-L flap was deemed most appropriate.  Using a sterile surgical marker, an appropriate advancement flap was drawn incorporating the defect and placing the expected incisions within the relaxed skin tension lines where possible.    The area thus outlined was incised deep to adipose tissue with a #15 scalpel blade.  The skin margins were undermined to an appropriate distance in all directions utilizing iris scissors.

## 2023-10-05 NOTE — PHARMACY-VANCOMYCIN DOSING SERVICE
Pharmacy Vancomycin Note  Date of Service 2023  Patient's  1945   78 year old, female    Indication: Bone and Joint Infection and Skin and Soft Tissue Infection  Day of Therapy: 13 ( started  at [facility]   Current vancomycin regimen:  1250 mg IV q24h  Current vancomycin monitoring method: AUC  Current vancomycin therapeutic monitoring goal: 400-600 mg*h/L    InsightRX Prediction of Current Vancomycin Regimen    Regimen: 1250 mg IV every 24 hours.  Start time: 21:50 on 10/01/2023  Exposure target: AUC24 (range)400-600 mg/L.hr   AUC24,ss: 442 mg/L.hr  Probability of AUC24 > 400: 96 %  Ctrough,ss: 12 mg/L  Probability of Ctrough,ss > 20: 0 %  Probability of nephrotoxicity (Lodise COSTA ): 7 %     Current estimated CrCl = Estimated Creatinine Clearance: 56.2 mL/min (A) (based on SCr of 1.07 mg/dL (H)).     Current estimated CrCl = Estimated Creatinine Clearance: 65.1 mL/min (based on SCr of 0.93 mg/dL).    Creatinine for last 3 days  10/2/2023:  6:53 AM Creatinine 1.02 mg/dL  10/3/2023:  6:37 AM Creatinine 0.94 mg/dL  10/4/2023:  6:33 AM Creatinine 0.93 mg/dL    Recent Vancomycin Levels (past 3 days)  10/4/2023:  2:55 PM Vancomycin 15.9 ug/mL    Vancomycin IV Administrations (past 72 hours)                     vancomycin (VANCOCIN) 1,250 mg in 0.9% NaCl 250 mL intermittent infusion (mg) 1,250 mg New Bag 10/03/23 2024     1,250 mg New Bag 10/02/23 2015     1,250 mg New Bag 10/01/23 2021                    Nephrotoxins and other renal medications (From now, onward)      Start     Dose/Rate Route Frequency Ordered Stop    10/04/23 2000  vancomycin (VANCOCIN) 1,500 mg in 0.9% NaCl 250 mL intermittent infusion        Note to Pharmacy: Interval changed to q18h per pharmacy protocol.    1,500 mg  over 90 Minutes Intravenous EVERY 24 HOURS 10/04/23 1858                 Contrast Orders - past 72 hours (72h ago, onward)      Start     Dose/Rate Route Frequency Stop    10/03/23 1330  iodixanol (VISIPAQUE  320) injection 100 mL         100 mL INTRA-ARTERIAL ONCE 10/03/23 1356            Interpretation of levels and current regimen:  Vancomycin level is reflective of AUC less than 400    Has serum creatinine changed greater than 50% in last 72 hours: No    Urine output:  good urine output    Renal Function: Improving    InsightRX Prediction of Planned New Vancomycin Regimen  Regimen: 1500 mg IV every 24 hours.  Start time: 20:24 on 10/04/2023  Exposure target: AUC24 (range)400-600 mg/L.hr   AUC24,ss: 484 mg/L.hr  Probability of AUC24 > 400: 100 %  Ctrough,ss: 15.1 mg/L  Probability of Ctrough,ss > 20: 0 %  Probability of nephrotoxicity (Lodise COSTA 2009): 10 %      Plan:  Increase Dose to 1500mg q 24 hours   Vancomycin monitoring method: AUC  Vancomycin therapeutic monitoring goal: 400-600 mg*h/L  Pharmacy will check vancomycin levels as appropriate in 3-5 Days.  Serum creatinine levels will be ordered a minimum of twice weekly.    Letitia Diaz RPH

## 2023-10-06 ENCOUNTER — APPOINTMENT (OUTPATIENT)
Dept: PHYSICAL THERAPY | Facility: CLINIC | Age: 78
DRG: 617 | End: 2023-10-06
Attending: HOSPITALIST
Payer: COMMERCIAL

## 2023-10-06 LAB
CREAT SERPL-MCNC: 0.89 MG/DL (ref 0.51–0.95)
EGFRCR SERPLBLD CKD-EPI 2021: 66 ML/MIN/1.73M2
ERYTHROCYTE [DISTWIDTH] IN BLOOD BY AUTOMATED COUNT: 13.1 % (ref 10–15)
GLUCOSE BLDC GLUCOMTR-MCNC: 111 MG/DL (ref 70–99)
GLUCOSE BLDC GLUCOMTR-MCNC: 116 MG/DL (ref 70–99)
GLUCOSE BLDC GLUCOMTR-MCNC: 135 MG/DL (ref 70–99)
GLUCOSE BLDC GLUCOMTR-MCNC: 149 MG/DL (ref 70–99)
GLUCOSE BLDC GLUCOMTR-MCNC: 98 MG/DL (ref 70–99)
HCT VFR BLD AUTO: 27.1 % (ref 35–47)
HGB BLD-MCNC: 8.6 G/DL (ref 11.7–15.7)
INR PPP: 1.3 (ref 0.85–1.15)
MCH RBC QN AUTO: 29.7 PG (ref 26.5–33)
MCHC RBC AUTO-ENTMCNC: 31.7 G/DL (ref 31.5–36.5)
MCV RBC AUTO: 93 FL (ref 78–100)
PLATELET # BLD AUTO: 183 10E3/UL (ref 150–450)
RBC # BLD AUTO: 2.9 10E6/UL (ref 3.8–5.2)
UFH PPP CHRO-ACNC: 0.46 IU/ML
WBC # BLD AUTO: 6.1 10E3/UL (ref 4–11)

## 2023-10-06 PROCEDURE — 250N000011 HC RX IP 250 OP 636: Performed by: HOSPITALIST

## 2023-10-06 PROCEDURE — 250N000013 HC RX MED GY IP 250 OP 250 PS 637: Performed by: INTERNAL MEDICINE

## 2023-10-06 PROCEDURE — 97530 THERAPEUTIC ACTIVITIES: CPT | Mod: GP

## 2023-10-06 PROCEDURE — G0463 HOSPITAL OUTPT CLINIC VISIT: HCPCS

## 2023-10-06 PROCEDURE — 250N000013 HC RX MED GY IP 250 OP 250 PS 637: Performed by: HOSPITALIST

## 2023-10-06 PROCEDURE — 258N000003 HC RX IP 258 OP 636: Performed by: HOSPITALIST

## 2023-10-06 PROCEDURE — 120N000001 HC R&B MED SURG/OB

## 2023-10-06 PROCEDURE — 250N000011 HC RX IP 250 OP 636: Mod: JZ | Performed by: HOSPITALIST

## 2023-10-06 PROCEDURE — 85610 PROTHROMBIN TIME: CPT | Performed by: INTERNAL MEDICINE

## 2023-10-06 PROCEDURE — 82565 ASSAY OF CREATININE: CPT | Performed by: HOSPITALIST

## 2023-10-06 PROCEDURE — 85027 COMPLETE CBC AUTOMATED: CPT | Performed by: HOSPITALIST

## 2023-10-06 PROCEDURE — 99233 SBSQ HOSP IP/OBS HIGH 50: CPT | Performed by: INTERNAL MEDICINE

## 2023-10-06 PROCEDURE — 250N000011 HC RX IP 250 OP 636: Mod: JZ | Performed by: INTERNAL MEDICINE

## 2023-10-06 PROCEDURE — 85520 HEPARIN ASSAY: CPT | Performed by: INTERNAL MEDICINE

## 2023-10-06 RX ADMIN — METHADONE HYDROCHLORIDE 5 MG: 5 TABLET ORAL at 06:33

## 2023-10-06 RX ADMIN — MEROPENEM 500 MG: 500 INJECTION, POWDER, FOR SOLUTION INTRAVENOUS at 23:42

## 2023-10-06 RX ADMIN — MICONAZOLE NITRATE: 2 POWDER TOPICAL at 09:05

## 2023-10-06 RX ADMIN — METHADONE HYDROCHLORIDE 5 MG: 5 TABLET ORAL at 11:52

## 2023-10-06 RX ADMIN — ASPIRIN 81 MG: 81 TABLET, COATED ORAL at 09:05

## 2023-10-06 RX ADMIN — MEROPENEM 500 MG: 500 INJECTION, POWDER, FOR SOLUTION INTRAVENOUS at 11:52

## 2023-10-06 RX ADMIN — HEPARIN SODIUM 1400 UNITS/HR: 10000 INJECTION, SOLUTION INTRAVENOUS at 13:54

## 2023-10-06 RX ADMIN — METHADONE HYDROCHLORIDE 10 MG: 10 TABLET ORAL at 15:44

## 2023-10-06 RX ADMIN — MEROPENEM 500 MG: 500 INJECTION, POWDER, FOR SOLUTION INTRAVENOUS at 06:32

## 2023-10-06 RX ADMIN — INSULIN DEGLUDEC 15 UNITS: 100 INJECTION, SOLUTION SUBCUTANEOUS at 22:28

## 2023-10-06 RX ADMIN — METHADONE HYDROCHLORIDE 5 MG: 5 TABLET ORAL at 20:26

## 2023-10-06 RX ADMIN — VANCOMYCIN HYDROCHLORIDE 1500 MG: 10 INJECTION, POWDER, LYOPHILIZED, FOR SOLUTION INTRAVENOUS at 20:49

## 2023-10-06 RX ADMIN — ATORVASTATIN CALCIUM 40 MG: 40 TABLET, FILM COATED ORAL at 20:27

## 2023-10-06 RX ADMIN — MEROPENEM 500 MG: 500 INJECTION, POWDER, FOR SOLUTION INTRAVENOUS at 18:12

## 2023-10-06 ASSESSMENT — ACTIVITIES OF DAILY LIVING (ADL)
ADLS_ACUITY_SCORE: 30

## 2023-10-06 NOTE — PROGRESS NOTES
"[facility] Nurse Inpatient Assessment     Consulted for: 9/30 Wound diabetic foot ulcer left foot - wound vac placement. 10/2 Wound right buttocks.   10/6: Per chart review, patient is now agreeable to left BKA. Vascular surgery has been re-consulted.     Summary:   1) Left heel ulcer: Infected with osteomyelitis, recent debridement by podiatry 9/26, topical dressing applied 10/4.   2) BL legs: Chronic venous stasis with superficial healing wounds, not assessed 10/4, assessed weekly.   3) Buttock/Coccyx: Mixture of friction and moisture, not assessed 10/4, assessed weekly.   4) Left IT: Unstageable ulcer present on admission, not assessed 10/4, assessed weekly.     Patient History (according to provider note(s):      \"78 year old female admitted on 9/29/2023. She presents as a transfer from [facility] for L heel osteomyelitis \"    Assessment:      Areas visualized during today's visit: Heels , Sacrum/coccyx, and Lower extremities left     Wound location: Left heel  Last photo: 10/6/23        Wound due to: Pressure Injury and Diabetic Ulcer  Wound history/plan of care: * Plan is for BTKA during this hospitalization. Continuing POC with Hydrofera and gauze since supplies are in room.  Wound base: 70 % fibrin and slough, 20 % non-granular tissue 10% bone     Palpation of the wound bed: firm and fluctuance-palpated down to bone     Drainage: moderate     Description of drainage: serosanguinous     Measurements (length x width x depth, in cm): 11  x 6  x  1.5 cm (not measured 10/6)     Tunneling: N/A     Undermining: N/A  Periwound skin: Dry/scaly and Superficial erosion, nonblanchable maroon       Color:  scattered micro bleeding under tissues      Temperature: normal   Odor: none  Pain: moderate, intermittent, stabbing, and sharp  Pain interventions prior to dressing change: slow and gentle cares  and distraction  Treatment goal: Drainage control and Infection control/prevention  STATUS: " improving and stable  Supplies ordered: at bedside and discussed with patient     Wound location: Right posterior LE  Last photo: 10/6/23      Wound due to: Venous Ulcer  Wound history/plan of care: wound found NANCY on assessment  Chronic non healing ulcer, chronic edema with diabetes found covered with a mepilex  Wound base: red moist tissue with small scattered areas of yellow, adherent fibrinous tissue      Palpation of the wound bed: normal      Drainage: scant     Description of drainage: serosanguinous     Measurements (length x width x depth, in cm): 1.5  x 2  x  0.1 cm      Tunneling: N/A     Undermining: N/A  Periwound skin: Intact, Edematous, and Erythema- blanchable      Color: pink      Temperature: normal   Odor: none  Pain: denies , none  Pain interventions prior to dressing change: N/A  Treatment goal: Heal  and Infection control/prevention  STATUS: improving  Supplies ordered: at bedside, supplies stored on unit, and discussed with RN    Wound location: Left lateral LE  Last photo: 10/6/23        Wound due to: Venous Ulcer  Wound history/plan of care: Chronic non healing ulcer, chronic edema with diabetes found covered with a mepilex  Wound base: pink, shiny epidermis     Palpation of the wound bed: normal      Drainage: scant     Description of drainage: serosanguinous     Measurements (length x width x depth, in cm): 2  x 0.8  x  0.1 cm (not measured 10/6)     Tunneling: N/A     Undermining: N/A  Periwound skin: Dry/scaly, Edematous, and residual Triad paste      Color: normal and consistent with surrounding tissue      Temperature: normal   Odor: none  Pain: denies , none  Pain interventions prior to dressing change: N/A  Treatment goal: Heal   STATUS: resurfaced  Supplies ordered: at bedside    Wound location: Coccyx/buttock  Last photo: 10/2/23        Wound due to: Friction and Incontinence Associated Dermatitis (IAD)  Wound history/plan of care: Patient with wounds on admission. Pt reports that  her buttocks and sacrum are painful. Found with Triad paste on buttocks.   Wound base: Moist red tissue, evidence of chronic tissue injury to bilateral buttocks and ITs     Palpation of the wound bed: normal      Drainage: moderate,  serosanguinous and bloody     Description of drainage: serosanguinous and bloody     Measurements (length x width x depth, in cm):  10 x 6 x 0.1 cm (total area spanning buttocks)     Tunneling: N/A     Undermining: N/A  Periwound skin: Dry/scaly and Erythema- blanchable      Color: dusky and purple      Temperature: normal   Odor: none  Pain: mild, tender  Pain interventions prior to dressing change: slow and gentle cares   Treatment goal: Heal , Drainage control, Decrease moisture, and Protection  STATUS: unchanged  Supplies ordered: at bedside, supplies stored on unit, discussed with RN, and discussed with patient     Wound location: Right IT        Last photo: 10/6/23  Wound due to: Mixed Etiology: moisture, friction, pressure  Wound history/plan of care: Patient sitting at >45 degree angle on assessment. Wound covered with Triad. Patient has three incontinence pads under her as well as Purewick in place.    Wound base: Yellow /pink marbled tissue, yellow tissue is adherent, fibrinous,     Palpation of the wound bed: normal      Drainage: scant     Description of drainage: serosanguinous     Measurements (length x width x depth, in cm): 1.2  x 2  x  0.2 cm      Tunneling: N/A     Undermining: N/A  Periwound skin: Dry/scaly and Erythema- blanchable      Color: dusky and pink      Temperature: normal   Odor: none  Pain: mild, stinging  Pain interventions prior to dressing change: soaking and slow and gentle cares   Treatment goal: Heal , Drainage control, Decrease moisture, and Protection  STATUS: initial assessment  Supplies ordered: at bedside, supplies stored on unit, and discussed with RN        Treatment Plan:       Wound care  EVERY OTHER DAY      Comments: BL lower legs: Every  "other day and prn  1.Cleanse the area with Marisol cleanse and protect and dinah dry wipes/soft cloth.  2. Apply nickel thick layer of Triad paste (#621101) to wound bed (right posterior calf)   3. Cover with  Border Dressing (#[id number])          Wound care  Daily and PRN with incontinence        Comments: Coccyx, buttock and Right IT wound(s): BID and PRN incontinence  1.Cleanse the area with Marisol cleanse and protect and dinah dry wipes/soft cloth. Ensure skin is dry.  2. Swab sreekanth-wound skin with no-sting barrier and allow to dry completely.  3. Use a sacral Mepilex to cover open, bleeding areas. Works best to apply dressing \"upside down\".  *Leave the brief off in bed to let the area dry as much as possible.  *Use only one Covidien pad in between mattress and pt. No brief while in bed.  *Follow PIP Plan            Wound care  EVERY MWF      Comments: Location: left heel  Care: provided by primary RN M/W/F  1. Cleanse wound with commercial wound cleanser Micorklenz (avoid vashe due to not compatible with hydrofera) soaked gauze  2. Moisten cut to fit Hydrofera Blue (#512888) with Normal saline, wring out excess so it is damp and gently pack into wound (this will remain in wound as primary dressing)  3. Apply Cavilon No Sting Skin Prep (#175294) to periwound and allow to dry.  4.  Cover wound with gauze sponge or ABD. Secure with Kerlix and tape  5. Time and date dressing change  *Float heels at all times with pillows or use Prevalon boots if not maintaining positioning.        Pressure Injury Prevention (PIP) Plan:  -If patient is declining pressure injury prevention interventions: Explore reason why and address patient's concerns, Educate on pressure injury risk and prevention intervention(s), If patient is still declining, document \"informed refusal\" , and Ensure Care team is aware ( provider, charge nurse, etc)  -Mattress: Follow bed algorithm, reassess daily and order specialty mattress, if indicated.  -HOB: " Maintain at or below 30 degrees, unless contraindicated  -Repositioning in bed: Every 1-2 hours , Left/right positioning; avoid supine, and Raise foot of bed prior to raising head of bed, to reduce patient sliding down (shear)  -Heels: Keep elevated off mattress  -Protective Dressing:  Mepilex to R heel  -Positioning Equipment:  pillows  -Chair positioning: Chair cushion (#[id number]) , Repositions self: patient to shift weight every 15 minutes, and Do NOT use a donut for sitting (this increases pressure to smaller area and creates a higher potential for injury)    -Moisture Management: Perineal cleansing /protection: Follow Incontinence Protocol, Avoid brief in bed, Clean and dry skin folds with bathing , and Moisturize dry skin  -Under Devices: Inspect skin under all medical devices during skin inspection , Ensure tubes are stabilized without tension, and Ensure patient is not lying on medical devices or equipment when repositioned     Orders: Reviewed and Updated    RECOMMEND PRIMARY TEAM ORDER: None, at this time  Education provided: plan of care, wound progress, Moisture management, Hygiene, and Off-loading pressure  Discussed plan of care with: Patient and Nurse   WOC nurse follow-up plan: weekly  Notify WOC if wound(s) deteriorate.  Nursing to notify the Provider(s) and re-consult the WOC Nurse if new skin concern.    DATA:     Current support surface: Standard  Low air loss (BRISA pump, Isolibrium, Pulsate, skin guard, etc)  Containment of urine/stool: Incontinence Protocol, Incontinent pad in bed, and Purewick external catheter   BMI: Body mass index is 41.88 kg/m .   Active diet order: Orders Placed This Encounter      Moderate Consistent Carb (60 g CHO per Meal) Diet     Output: I/O last 3 completed shifts:  In: 120 [P.O.:120]  Out: 1300 [Urine:1300]     Labs:   Recent Labs   Lab 10/06/23  0624   HGB 8.6*   INR 1.30*   WBC 6.1       Pressure injury risk assessment:   Sensory Perception: 4-->no  "impairment  Moisture: 3-->occasionally moist  Activity: 2-->chairfast  Mobility: 2-->very limited  Nutrition: 3-->adequate  Friction and Shear: 2-->potential problem  Suresh Score: 16    Bettye Allison RN, CWON  Please contact via [employer] system at name or group \"[facility] nurse\"- M-F 8A-4P  Leave VM @ *[phone] for non-urgent needs. Checked occasionally M-F.         "

## 2023-10-06 NOTE — PLAN OF CARE
Goal Outcome Evaluation:      Plan of Care Reviewed With: patient  Summary:  Cellulites/Osteomyelitis of left heel.     DATE & TIME: 10/6/23 8343-0073  Cognitive Concerns/ Orientation : A&OX4. Pleasant  BEHAVIOR & AGGRESSION TOOL COLOR: Green  CIWA SCORE: NA   ABNL VS/O2: VSS, on room air, afebrile  MOBILITY: Ax1 stand and Pivot to BSC, Rt heel NWB.   PAIN MANAGMENT: only c/o pain while pivoting, -  on scheduled methadone q6h.    DIET: Mod carb diet, NPO from midnight    BOWEL/BLADDER: Continent, prefers to use purewick while in bed.    ABNL LAB/BG:  hep 10A  0.46 (within goal range), recheck tomorrow, , 98, 116, Hgb 8.6,   DRAIN/DEVICES: PICC on R upper arm saline locked. Left PIV infusing Heparin 1400 Units/hr, order in place to stop Hep gtt at 0100 10/7/23.   TELEMETRY RHYTHM: NA  SKIN: Multiple wounds to BLE and coccyx area, dressings changed per WOC RN;  TESTS/PROCEDURES: Scheduled for Lt BKA tomorrow 0730. Order in place to stop Hep gtt at 0100.   D/C DAY/GOALS/PLACE: Pending  OTHER IMPORTANT INFO: Pt agreeable to operative interventions, vascular team aware. On IV Merrem q6h and IV Vancomycin daily.

## 2023-10-06 NOTE — PLAN OF CARE
Goal Outcome Evaluation:       Summary:  Cellulites/Osteomyelitis of left heel.     DATE & TIME: 10/5/23 [id number]  Cognitive Concerns/ Orientation : A&OX4. Pleasant, markedly   BEHAVIOR & AGGRESSION TOOL COLOR: Green  CIWA SCORE: NA   ABNL VS/O2: VSS, on room air, afebrile  MOBILITY: Ax1 stand and Pivot -  PAIN MANAGMENT: only c/o pain while pivoting, -  on scheduled methadone,    DIET: Mod Carb-good appetite   BOWEL/BLADDER: Continent. Purewick in placed with adequate output.   ABNL LAB/BG:  hep 10A  recheck this morning,   DRAIN/DEVICES: PICC on R upper arm saline locked. Left PIV infusing Heparin  TELEMETRY RHYTHM: NA  SKIN: Multiple wounds to BLE - dressings CDI; wounds to coccyx and buttocks wound care performed   TESTS/PROCEDURES: LLE angio complete on 10/03  D/C DAY/GOALS/PLACE: Pending  OTHER IMPORTANT INFO: Pt agreeable to operative interventions - orders not released d/t procedure not scheduled yet.

## 2023-10-06 NOTE — PROGRESS NOTES
Pt reported she has agreed to go ahead with recommended procedure. Paged vascular, no plans for surgery today, possible tomorrow if there's an opening. Ordered to continue Hep gtt today.

## 2023-10-06 NOTE — PROGRESS NOTES
[facility]    Medicine Progress Note - Hospitalist Service    Date of Admission:  9/29/2023    Assessment & Plan   Linda Loredo is a 78 year old female admitted on 9/29/2023. She was transferred from [facility] for L heel osteomyelitis.     L foot decubitus ulcer status debridement by podiatry on 9/26 with multiple organisms  L heel osteomyelitis  L foot cellulitis  Chronic venous stasis  MRSA positive hx:  Summary:  Brought in 9/23 after welfare check by police found to have L leg swelling and erythema.  Hx venous stasis ulcers. Presents with L heel decubitus ulcer with osteo. Wound with multiple organisms incl pseudomonas, morganella, e faecalis, bordetella, proteus status underwent debridement 9/26 by podiatry.   -Blood cultures done on 9/25/2023 have been negative so far  -ALMA w/ poor flow, podiatry at outside hospital requested transfer where there are vascular services.  -On presentation to the floor patient was mildly hypertensive and was given IV fluids with improvement in blood pressure and she is asymptomatic.     -  Patient is currently continued on vancomycin and meropenem as per prior recommendations and status post PICC line placement on 9/27 and infectious disease, podiatry and vascular surgery team assisting.  After considering options she has decided to proceed with amputation.  Vascular surgery is working on scheduling surgery.   Remaining on IV meropenem and IV vanco until BKA, then continue IV abx until 24 hours post-amputation per ID recommendation..     Acute on chronic pain syndrome:   - continue methadone 10 mg daily midday and 5 mg TID  Discontinue IV dilaudid ordered (has not been using)    Small dose po dilaudid prn for breakthrough pain   Suspect that she may have difficulty with post-op pain control when she undergoes BKA - will need to consider pain team consult pending her post-op course.     PDMP reviewed - 9/15 - 40 tabs of oxy/apap and 150 tabls  of methadone 5mg filled     Urine culture positive for E faecalis, Staph aureus and E. Coli  -UA done at outside hospital on 9/23 showed above and patient is currently on broad-spectrum antibiotics including vancomycin and imipenem  -She is already on broad-spectrum antibiotics and continue the same.     DM II:  [PTA degludec 35 units at HS, metformin 2000 mg daily, ozempic]  A1c 7.0 9/23/23. Has been on insulin pump in the past but this was changed to tresiba 8/2023.   -We will continue to hold metformin, Ozempic   -We will continue with  long-acting insulin at reduced dose along with sliding scale insulin and monitor her sugar this morning is stable  -Continue with a slightly decreased dose of tresiba of 15 units at bedtime     Recent Labs   Lab 10/06/23  1148 10/06/23  0810 10/06/23  0203 10/05/23  2109 10/05/23  1719   GLC 98 111* 135* 151* 181*       Atrial fibrillation  HTN  HLD:  [PTA- warfarin, simvastatin 20 mg daily, lisinopril 5 mg daily]  -Continue with statin, warfarin currently is on hold - continue heparin drip.     CKD III:  -Baseline creatinine 1-1.2.   -Stable, monitor occasionally, poorly possibly obtained creatinine following angiogram.    Anemia:  -Hgb at 9.3, baseline recent 9-10.       Failure to thrive:  As above, called by police for welfare check. ? Safety at home. SW had seen at [facility].   -Social work team has been consulted, tentative plan is possible discharge to TCU depending on surgical plan timing.     BERLIN:  -We will continue home cpap       Medical Decision Making       45 MINUTES SPENT BY ME on the date of service doing chart review, history, exam, documentation & further activities per the note.      Labs/Imaging Reviewed:  See Information above and Data section below    PPE Used:  Mask, gown/gloves       Diet: Moderate Consistent Carb (60 g CHO per Meal) Diet    DVT Prophylaxis: Heparin drip  Braga Catheter: Not present  Lines: PRESENT      PICC 09/27/23 Single Lumen Right Basilic  "antibiotics-Site Assessment: WDL      Cardiac Monitoring: None  Code Status: Full Code      Clinically Significant Risk Factors               # Coagulation Defect: INR = 1.30 (Ref range: 0.85 - 1.15) and/or PTT = N/A, will monitor for bleeding    # Hypertension: Noted on problem list       # DMII: A1C = 7.0 % (Ref range: <5.7 %) within past 6 months   # Severe Obesity: Estimated body mass index is 41.88 kg/m  as calculated from the following:    Height as of this encounter: 1.676 m (5' 6\").    Weight as of this encounter: 117.7 kg (259 lb 7.7 oz).             Disposition Plan      Expected Discharge Date: 10/09/2023      Destination: inpatient rehabilitation facility            Yvon Lezama DO  Hospitalist Service  [facility]  Securely message with [employer] (more info)  Text page via [facility] Paging/Directory   ______________________________________________________________________    Interval History   Assumed care, history reviewed.  No major new complaints.  Expressed understanding of issues and desire to proceed with amputation.    Physical Exam   Vital Signs: Temp: 97.9  F (36.6  C) Temp src: Oral BP: 114/64 Pulse: 64   Resp: 18 SpO2: 96 % O2 Device: None (Room air)    Weight: 259 lbs 7.7 oz    GEN:  Alert, oriented, appears ill but comfortable, no overt distress.  HEENT:  Normocephalic/atraumatic, no scleral icterus, no nasal discharge, mouth moist.  CV:  Regular rate and rhythm, no loud murmur/rub.  LUNGS:  Clear to auscultation bilaterally without rales/rhonchi/wheezing/retractions.  Symmetric chest rise on inhalation noted.  ABD:  Active bowel sounds, soft, non-tender, mildly distended.  No guarding/rigidity.  EXT:  +1-2 edema.  No cyanosis.  Infection site detailed exam deferred as dressed now.  No new spreading erythema up leg.    Medications    heparin 1,400 Units/hr (10/06/23 5734)    - MEDICATION INSTRUCTIONS -        aspirin  81 mg Oral Daily    atorvastatin  40 mg Oral QPM    " insulin aspart  1-7 Units Subcutaneous TID AC    insulin aspart  1-5 Units Subcutaneous At Bedtime    insulin degludec  15 Units Subcutaneous At Bedtime    meropenem  500 mg Intravenous Q6H    methadone  10 mg Oral Daily    methadone  5 mg Oral TID    miconazole   Topical BID    sodium chloride (PF)  10-40 mL Intracatheter Q8H    vancomycin  1,500 mg Intravenous Q24H       Data     Labs and Imaging results below reviewed today.    I have personally reviewed the following data over the past 24 hrs:    6.1  \   8.6 (L)   / 183     N/A N/A N/A /  98   N/A N/A 0.89 \     INR:  1.30 (H) PTT:  N/A   D-dimer:  N/A Fibrinogen:  N/A           No results found for this or any previous visit (from the past 24 hour(s)).

## 2023-10-06 NOTE — PLAN OF CARE
Goal Outcome Evaluation:    Summary:  Cellulites/Osteomyelitis of left heel.     DATE & TIME: 10/5 mid  Cognitive Concerns/ Orientation : A&OX4. Pleasant, markedly   BEHAVIOR & AGGRESSION TOOL COLOR: Green  CIWA SCORE: NA   ABNL VS/O2: VSS, on room air, afebrile  MOBILITY: Ax1 stand and Pivot -  PAIN MANAGMENT: only c/o pain while pivoting, -  on scheduled methadone,    DIET: Mod Carb-good appetite   BOWEL/BLADDER: Continent. Purewick in placed with adequate output.   ABNL LAB/BG:  hep 10A  recheck this morning   DRAIN/DEVICES: PICC on R upper arm saline locked. Left   TELEMETRY RHYTHM: NA  SKIN: Multiple wounds to BLE - dressings CDI; wounds to coccyx and buttocks wound care performed yesterday  TESTS/PROCEDURES: LLE angio complete on 10/03  D/C DAY/GOALS/PLACE: Pending  OTHER IMPORTANT INFO: Pt agreeable to operative interventions - orders not released d/t procedure not scheduled.

## 2023-10-06 NOTE — PROGRESS NOTES
Patient is scheduled for BKA tomorrow morning.  Please make patient n.p.o. at midnight tonight.  Hold heparin drip at 0100 hrs 10/7/2023

## 2023-10-06 NOTE — PROGRESS NOTES
Vascular surgery progress note    I met with Margaret yesterday after she indicated to her care team that she would like to proceed with amputation.  We discussed plans for staged amputation starting with a guillotine below-knee amputation and proceeding to a formal below-knee amputation days later.  She understands that amputation carries significant risk of wound complication and additional wound infection but would still like to proceed.  BKA will be scheduled for the coming days or next week and I will let the patient know when the date and time scheduled.  There are no plans for surgery today.  Okay for diet and continue heparin drip bridge for atrial fibrillation.    Torey Alvarado MD

## 2023-10-07 ENCOUNTER — ANESTHESIA (OUTPATIENT)
Dept: SURGERY | Facility: CLINIC | Age: 78
DRG: 617 | End: 2023-10-07
Payer: COMMERCIAL

## 2023-10-07 ENCOUNTER — ANESTHESIA EVENT (OUTPATIENT)
Dept: SURGERY | Facility: CLINIC | Age: 78
DRG: 617 | End: 2023-10-07
Payer: COMMERCIAL

## 2023-10-07 ENCOUNTER — APPOINTMENT (OUTPATIENT)
Dept: SURGERY | Facility: PHYSICIAN GROUP | Age: 78
End: 2023-10-07
Payer: COMMERCIAL

## 2023-10-07 LAB
ABO/RH(D): NORMAL
ANION GAP SERPL CALCULATED.3IONS-SCNC: 8 MMOL/L (ref 7–15)
ANTIBODY SCREEN: NEGATIVE
BUN SERPL-MCNC: 16.8 MG/DL (ref 8–23)
CALCIUM SERPL-MCNC: 10.2 MG/DL (ref 8.8–10.2)
CHLORIDE SERPL-SCNC: 104 MMOL/L (ref 98–107)
CREAT SERPL-MCNC: 0.9 MG/DL (ref 0.51–0.95)
DEPRECATED HCO3 PLAS-SCNC: 27 MMOL/L (ref 22–29)
EGFRCR SERPLBLD CKD-EPI 2021: 65 ML/MIN/1.73M2
GLUCOSE BLDC GLUCOMTR-MCNC: 105 MG/DL (ref 70–99)
GLUCOSE BLDC GLUCOMTR-MCNC: 107 MG/DL (ref 70–99)
GLUCOSE BLDC GLUCOMTR-MCNC: 113 MG/DL (ref 70–99)
GLUCOSE BLDC GLUCOMTR-MCNC: 117 MG/DL (ref 70–99)
GLUCOSE BLDC GLUCOMTR-MCNC: 141 MG/DL (ref 70–99)
GLUCOSE BLDC GLUCOMTR-MCNC: 164 MG/DL (ref 70–99)
GLUCOSE SERPL-MCNC: 127 MG/DL (ref 70–99)
HGB BLD-MCNC: 7.9 G/DL (ref 11.7–15.7)
INR PPP: 1.2 (ref 0.85–1.15)
POTASSIUM SERPL-SCNC: 4 MMOL/L (ref 3.4–5.3)
SODIUM SERPL-SCNC: 139 MMOL/L (ref 135–145)
SPECIMEN EXPIRATION DATE: NORMAL
UFH PPP CHRO-ACNC: <0.1 IU/ML
VANCOMYCIN SERPL-MCNC: 18.3 UG/ML

## 2023-10-07 PROCEDURE — 360N000076 HC SURGERY LEVEL 3, PER MIN: Performed by: SURGERY

## 2023-10-07 PROCEDURE — 250N000009 HC RX 250: Performed by: NURSE ANESTHETIST, CERTIFIED REGISTERED

## 2023-10-07 PROCEDURE — 250N000013 HC RX MED GY IP 250 OP 250 PS 637: Performed by: STUDENT IN AN ORGANIZED HEALTH CARE EDUCATION/TRAINING PROGRAM

## 2023-10-07 PROCEDURE — 85610 PROTHROMBIN TIME: CPT | Performed by: INTERNAL MEDICINE

## 2023-10-07 PROCEDURE — 258N000003 HC RX IP 258 OP 636: Performed by: NURSE ANESTHETIST, CERTIFIED REGISTERED

## 2023-10-07 PROCEDURE — 258N000003 HC RX IP 258 OP 636: Performed by: STUDENT IN AN ORGANIZED HEALTH CARE EDUCATION/TRAINING PROGRAM

## 2023-10-07 PROCEDURE — 85018 HEMOGLOBIN: CPT | Performed by: STUDENT IN AN ORGANIZED HEALTH CARE EDUCATION/TRAINING PROGRAM

## 2023-10-07 PROCEDURE — 250N000025 HC SEVOFLURANE, PER MIN: Performed by: SURGERY

## 2023-10-07 PROCEDURE — 370N000017 HC ANESTHESIA TECHNICAL FEE, PER MIN: Performed by: SURGERY

## 2023-10-07 PROCEDURE — 85520 HEPARIN ASSAY: CPT | Performed by: INTERNAL MEDICINE

## 2023-10-07 PROCEDURE — 250N000011 HC RX IP 250 OP 636: Performed by: ANESTHESIOLOGY

## 2023-10-07 PROCEDURE — P9045 ALBUMIN (HUMAN), 5%, 250 ML: HCPCS | Mod: JZ | Performed by: NURSE ANESTHETIST, CERTIFIED REGISTERED

## 2023-10-07 PROCEDURE — 250N000011 HC RX IP 250 OP 636: Performed by: STUDENT IN AN ORGANIZED HEALTH CARE EDUCATION/TRAINING PROGRAM

## 2023-10-07 PROCEDURE — 86923 COMPATIBILITY TEST ELECTRIC: CPT | Performed by: INTERNAL MEDICINE

## 2023-10-07 PROCEDURE — 120N000001 HC R&B MED SURG/OB

## 2023-10-07 PROCEDURE — 80202 ASSAY OF VANCOMYCIN: CPT | Performed by: HOSPITALIST

## 2023-10-07 PROCEDURE — 250N000011 HC RX IP 250 OP 636: Mod: JZ | Performed by: STUDENT IN AN ORGANIZED HEALTH CARE EDUCATION/TRAINING PROGRAM

## 2023-10-07 PROCEDURE — 258N000003 HC RX IP 258 OP 636: Performed by: ANESTHESIOLOGY

## 2023-10-07 PROCEDURE — 99233 SBSQ HOSP IP/OBS HIGH 50: CPT | Mod: 25 | Performed by: INTERNAL MEDICINE

## 2023-10-07 PROCEDURE — 0Y6J0Z1 DETACHMENT AT LEFT LOWER LEG, HIGH, OPEN APPROACH: ICD-10-PCS | Performed by: SURGERY

## 2023-10-07 PROCEDURE — 80048 BASIC METABOLIC PNL TOTAL CA: CPT | Performed by: INTERNAL MEDICINE

## 2023-10-07 PROCEDURE — 999N000128 HC STATISTIC PERIPHERAL IV START W/O US GUIDANCE

## 2023-10-07 PROCEDURE — 250N000013 HC RX MED GY IP 250 OP 250 PS 637: Performed by: INTERNAL MEDICINE

## 2023-10-07 PROCEDURE — 86901 BLOOD TYPING SEROLOGIC RH(D): CPT | Performed by: ANESTHESIOLOGY

## 2023-10-07 PROCEDURE — 710N000010 HC RECOVERY PHASE 1, LEVEL 2, PER MIN: Performed by: SURGERY

## 2023-10-07 PROCEDURE — 27882 AMPUTATION OF LOWER LEG: CPT | Mod: LT | Performed by: SURGERY

## 2023-10-07 PROCEDURE — 272N000001 HC OR GENERAL SUPPLY STERILE: Performed by: SURGERY

## 2023-10-07 PROCEDURE — 250N000011 HC RX IP 250 OP 636: Performed by: NURSE ANESTHETIST, CERTIFIED REGISTERED

## 2023-10-07 PROCEDURE — 250N000009 HC RX 250: Performed by: ANESTHESIOLOGY

## 2023-10-07 PROCEDURE — 88307 TISSUE EXAM BY PATHOLOGIST: CPT | Mod: TC | Performed by: SURGERY

## 2023-10-07 PROCEDURE — 999N000141 HC STATISTIC PRE-PROCEDURE NURSING ASSESSMENT: Performed by: SURGERY

## 2023-10-07 PROCEDURE — 250N000011 HC RX IP 250 OP 636: Mod: JZ | Performed by: INTERNAL MEDICINE

## 2023-10-07 PROCEDURE — 250N000009 HC RX 250: Performed by: SURGERY

## 2023-10-07 PROCEDURE — 250N000011 HC RX IP 250 OP 636: Performed by: SURGERY

## 2023-10-07 RX ORDER — HYDROMORPHONE HCL IN WATER/PF 6 MG/30 ML
0.2 PATIENT CONTROLLED ANALGESIA SYRINGE INTRAVENOUS
Status: DISCONTINUED | OUTPATIENT
Start: 2023-10-07 | End: 2023-10-10

## 2023-10-07 RX ORDER — SODIUM CHLORIDE, SODIUM LACTATE, POTASSIUM CHLORIDE, CALCIUM CHLORIDE 600; 310; 30; 20 MG/100ML; MG/100ML; MG/100ML; MG/100ML
INJECTION, SOLUTION INTRAVENOUS CONTINUOUS
Status: DISCONTINUED | OUTPATIENT
Start: 2023-10-07 | End: 2023-10-09

## 2023-10-07 RX ORDER — FLUMAZENIL 0.1 MG/ML
0.2 INJECTION, SOLUTION INTRAVENOUS
Status: DISCONTINUED | OUTPATIENT
Start: 2023-10-07 | End: 2023-10-09

## 2023-10-07 RX ORDER — SODIUM CHLORIDE 9 MG/ML
INJECTION, SOLUTION INTRAVENOUS CONTINUOUS
Status: DISCONTINUED | OUTPATIENT
Start: 2023-10-07 | End: 2023-10-10

## 2023-10-07 RX ORDER — NALOXONE HYDROCHLORIDE 0.4 MG/ML
0.4 INJECTION, SOLUTION INTRAMUSCULAR; INTRAVENOUS; SUBCUTANEOUS
Status: DISCONTINUED | OUTPATIENT
Start: 2023-10-07 | End: 2023-10-07

## 2023-10-07 RX ORDER — ONDANSETRON 4 MG/1
4 TABLET, ORALLY DISINTEGRATING ORAL EVERY 6 HOURS PRN
Status: DISCONTINUED | OUTPATIENT
Start: 2023-10-07 | End: 2023-10-07

## 2023-10-07 RX ORDER — NALOXONE HYDROCHLORIDE 0.4 MG/ML
0.2 INJECTION, SOLUTION INTRAMUSCULAR; INTRAVENOUS; SUBCUTANEOUS
Status: DISCONTINUED | OUTPATIENT
Start: 2023-10-07 | End: 2023-10-07

## 2023-10-07 RX ORDER — ONDANSETRON 2 MG/ML
INJECTION INTRAMUSCULAR; INTRAVENOUS PRN
Status: DISCONTINUED | OUTPATIENT
Start: 2023-10-07 | End: 2023-10-07

## 2023-10-07 RX ORDER — PROPOFOL 10 MG/ML
INJECTION, EMULSION INTRAVENOUS PRN
Status: DISCONTINUED | OUTPATIENT
Start: 2023-10-07 | End: 2023-10-07

## 2023-10-07 RX ORDER — FENTANYL CITRATE 50 UG/ML
INJECTION, SOLUTION INTRAMUSCULAR; INTRAVENOUS PRN
Status: DISCONTINUED | OUTPATIENT
Start: 2023-10-07 | End: 2023-10-07

## 2023-10-07 RX ORDER — ONDANSETRON 2 MG/ML
4 INJECTION INTRAMUSCULAR; INTRAVENOUS EVERY 6 HOURS PRN
Status: DISCONTINUED | OUTPATIENT
Start: 2023-10-07 | End: 2023-10-07

## 2023-10-07 RX ORDER — ACETAMINOPHEN 325 MG/1
650 TABLET ORAL EVERY 4 HOURS PRN
Status: DISCONTINUED | OUTPATIENT
Start: 2023-10-10 | End: 2023-10-24 | Stop reason: HOSPADM

## 2023-10-07 RX ORDER — FENTANYL CITRATE 0.05 MG/ML
50 INJECTION, SOLUTION INTRAMUSCULAR; INTRAVENOUS EVERY 5 MIN PRN
Status: DISCONTINUED | OUTPATIENT
Start: 2023-10-07 | End: 2023-10-07 | Stop reason: HOSPADM

## 2023-10-07 RX ORDER — FENTANYL CITRATE 0.05 MG/ML
25 INJECTION, SOLUTION INTRAMUSCULAR; INTRAVENOUS EVERY 5 MIN PRN
Status: DISCONTINUED | OUTPATIENT
Start: 2023-10-07 | End: 2023-10-07 | Stop reason: HOSPADM

## 2023-10-07 RX ORDER — LABETALOL HYDROCHLORIDE 5 MG/ML
10 INJECTION, SOLUTION INTRAVENOUS
Status: DISCONTINUED | OUTPATIENT
Start: 2023-10-07 | End: 2023-10-07 | Stop reason: HOSPADM

## 2023-10-07 RX ORDER — HYDROMORPHONE HCL IN WATER/PF 6 MG/30 ML
0.4 PATIENT CONTROLLED ANALGESIA SYRINGE INTRAVENOUS EVERY 5 MIN PRN
Status: DISCONTINUED | OUTPATIENT
Start: 2023-10-07 | End: 2023-10-07 | Stop reason: HOSPADM

## 2023-10-07 RX ORDER — SODIUM CHLORIDE, SODIUM LACTATE, POTASSIUM CHLORIDE, CALCIUM CHLORIDE 600; 310; 30; 20 MG/100ML; MG/100ML; MG/100ML; MG/100ML
INJECTION, SOLUTION INTRAVENOUS CONTINUOUS
Status: DISCONTINUED | OUTPATIENT
Start: 2023-10-07 | End: 2023-10-07 | Stop reason: HOSPADM

## 2023-10-07 RX ORDER — ONDANSETRON 2 MG/ML
4 INJECTION INTRAMUSCULAR; INTRAVENOUS EVERY 30 MIN PRN
Status: DISCONTINUED | OUTPATIENT
Start: 2023-10-07 | End: 2023-10-07 | Stop reason: HOSPADM

## 2023-10-07 RX ORDER — CEFAZOLIN SODIUM/WATER 2 G/20 ML
2 SYRINGE (ML) INTRAVENOUS
Status: COMPLETED | OUTPATIENT
Start: 2023-10-07 | End: 2023-10-07

## 2023-10-07 RX ORDER — OXYCODONE HYDROCHLORIDE 5 MG/1
5 TABLET ORAL EVERY 4 HOURS PRN
Status: DISCONTINUED | OUTPATIENT
Start: 2023-10-07 | End: 2023-10-12

## 2023-10-07 RX ORDER — HYDROMORPHONE HCL IN WATER/PF 6 MG/30 ML
0.4 PATIENT CONTROLLED ANALGESIA SYRINGE INTRAVENOUS
Status: DISCONTINUED | OUTPATIENT
Start: 2023-10-07 | End: 2023-10-10

## 2023-10-07 RX ORDER — ONDANSETRON 4 MG/1
4 TABLET, ORALLY DISINTEGRATING ORAL EVERY 30 MIN PRN
Status: DISCONTINUED | OUTPATIENT
Start: 2023-10-07 | End: 2023-10-07 | Stop reason: HOSPADM

## 2023-10-07 RX ORDER — BUPIVACAINE HYDROCHLORIDE AND EPINEPHRINE 5; 5 MG/ML; UG/ML
INJECTION, SOLUTION PERINEURAL
Status: COMPLETED | OUTPATIENT
Start: 2023-10-07 | End: 2023-10-07

## 2023-10-07 RX ORDER — HYDROMORPHONE HCL IN WATER/PF 6 MG/30 ML
0.2 PATIENT CONTROLLED ANALGESIA SYRINGE INTRAVENOUS EVERY 5 MIN PRN
Status: DISCONTINUED | OUTPATIENT
Start: 2023-10-07 | End: 2023-10-07 | Stop reason: HOSPADM

## 2023-10-07 RX ORDER — LIDOCAINE HYDROCHLORIDE 20 MG/ML
INJECTION, SOLUTION INFILTRATION; PERINEURAL PRN
Status: DISCONTINUED | OUTPATIENT
Start: 2023-10-07 | End: 2023-10-07

## 2023-10-07 RX ORDER — CEFAZOLIN SODIUM/WATER 2 G/20 ML
2 SYRINGE (ML) INTRAVENOUS SEE ADMIN INSTRUCTIONS
Status: DISCONTINUED | OUTPATIENT
Start: 2023-10-07 | End: 2023-10-08

## 2023-10-07 RX ORDER — AMOXICILLIN 250 MG
1 CAPSULE ORAL 2 TIMES DAILY
Status: DISCONTINUED | OUTPATIENT
Start: 2023-10-07 | End: 2023-10-12

## 2023-10-07 RX ORDER — POLYETHYLENE GLYCOL 3350 17 G/17G
17 POWDER, FOR SOLUTION ORAL DAILY
Status: DISCONTINUED | OUTPATIENT
Start: 2023-10-08 | End: 2023-10-10

## 2023-10-07 RX ORDER — OXYCODONE HYDROCHLORIDE 5 MG/1
10 TABLET ORAL EVERY 4 HOURS PRN
Status: DISCONTINUED | OUTPATIENT
Start: 2023-10-07 | End: 2023-10-09

## 2023-10-07 RX ORDER — ACETAMINOPHEN 325 MG/1
975 TABLET ORAL EVERY 8 HOURS
Status: COMPLETED | OUTPATIENT
Start: 2023-10-07 | End: 2023-10-10

## 2023-10-07 RX ORDER — BISACODYL 10 MG
10 SUPPOSITORY, RECTAL RECTAL DAILY PRN
Status: DISCONTINUED | OUTPATIENT
Start: 2023-10-07 | End: 2023-10-24 | Stop reason: HOSPADM

## 2023-10-07 RX ORDER — LIDOCAINE 40 MG/G
CREAM TOPICAL
Status: DISCONTINUED | OUTPATIENT
Start: 2023-10-07 | End: 2023-10-09

## 2023-10-07 RX ADMIN — LIDOCAINE HYDROCHLORIDE 40 MG: 20 INJECTION, SOLUTION INFILTRATION; PERINEURAL at 07:52

## 2023-10-07 RX ADMIN — MEROPENEM 500 MG: 500 INJECTION, POWDER, FOR SOLUTION INTRAVENOUS at 23:42

## 2023-10-07 RX ADMIN — FENTANYL CITRATE 25 MCG: 50 INJECTION, SOLUTION INTRAMUSCULAR; INTRAVENOUS at 09:10

## 2023-10-07 RX ADMIN — BUPIVACAINE HYDROCHLORIDE AND EPINEPHRINE BITARTRATE 15 ML: 5; .005 INJECTION, SOLUTION PERINEURAL at 07:26

## 2023-10-07 RX ADMIN — ONDANSETRON 4 MG: 2 INJECTION INTRAMUSCULAR; INTRAVENOUS at 08:18

## 2023-10-07 RX ADMIN — MEROPENEM 500 MG: 500 INJECTION, POWDER, FOR SOLUTION INTRAVENOUS at 17:08

## 2023-10-07 RX ADMIN — METHADONE HYDROCHLORIDE 10 MG: 10 TABLET ORAL at 17:08

## 2023-10-07 RX ADMIN — PHENYLEPHRINE HYDROCHLORIDE 100 MCG: 10 INJECTION INTRAVENOUS at 08:03

## 2023-10-07 RX ADMIN — METHADONE HYDROCHLORIDE 5 MG: 5 TABLET ORAL at 05:49

## 2023-10-07 RX ADMIN — Medication 2 G: at 07:51

## 2023-10-07 RX ADMIN — PROPOFOL 200 MG: 10 INJECTION, EMULSION INTRAVENOUS at 07:59

## 2023-10-07 RX ADMIN — ALBUMIN HUMAN: 0.05 INJECTION, SOLUTION INTRAVENOUS at 08:12

## 2023-10-07 RX ADMIN — ACETAMINOPHEN 975 MG: 325 TABLET, FILM COATED ORAL at 20:10

## 2023-10-07 RX ADMIN — METHADONE HYDROCHLORIDE 5 MG: 5 TABLET ORAL at 20:12

## 2023-10-07 RX ADMIN — SODIUM CHLORIDE, POTASSIUM CHLORIDE, SODIUM LACTATE AND CALCIUM CHLORIDE: 600; 310; 30; 20 INJECTION, SOLUTION INTRAVENOUS at 07:15

## 2023-10-07 RX ADMIN — MIDAZOLAM 1 MG: 1 INJECTION INTRAMUSCULAR; INTRAVENOUS at 07:38

## 2023-10-07 RX ADMIN — MEROPENEM 500 MG: 500 INJECTION, POWDER, FOR SOLUTION INTRAVENOUS at 04:38

## 2023-10-07 RX ADMIN — FENTANYL CITRATE 50 MCG: 50 INJECTION INTRAMUSCULAR; INTRAVENOUS at 08:11

## 2023-10-07 RX ADMIN — MEROPENEM 500 MG: 500 INJECTION, POWDER, FOR SOLUTION INTRAVENOUS at 12:15

## 2023-10-07 RX ADMIN — HEPARIN SODIUM 1400 UNITS/HR: 10000 INJECTION, SOLUTION INTRAVENOUS at 17:19

## 2023-10-07 RX ADMIN — FENTANYL CITRATE 50 MCG: 50 INJECTION INTRAMUSCULAR; INTRAVENOUS at 07:52

## 2023-10-07 RX ADMIN — SODIUM CHLORIDE: 9 INJECTION, SOLUTION INTRAVENOUS at 11:47

## 2023-10-07 RX ADMIN — METHADONE HYDROCHLORIDE 5 MG: 5 TABLET ORAL at 12:18

## 2023-10-07 RX ADMIN — MICONAZOLE NITRATE: 2 POWDER TOPICAL at 20:19

## 2023-10-07 RX ADMIN — Medication 23 ML: at 07:21

## 2023-10-07 RX ADMIN — OXYCODONE HYDROCHLORIDE 5 MG: 5 TABLET ORAL at 12:17

## 2023-10-07 RX ADMIN — SENNOSIDES AND DOCUSATE SODIUM 1 TABLET: 8.6; 5 TABLET ORAL at 20:11

## 2023-10-07 RX ADMIN — PHENYLEPHRINE HYDROCHLORIDE 0.5 MCG/KG/MIN: 10 INJECTION INTRAVENOUS at 08:05

## 2023-10-07 RX ADMIN — ACETAMINOPHEN 975 MG: 325 TABLET, FILM COATED ORAL at 12:17

## 2023-10-07 RX ADMIN — ATORVASTATIN CALCIUM 40 MG: 40 TABLET, FILM COATED ORAL at 20:11

## 2023-10-07 RX ADMIN — MIDAZOLAM 1 MG: 1 INJECTION INTRAMUSCULAR; INTRAVENOUS at 07:27

## 2023-10-07 ASSESSMENT — ACTIVITIES OF DAILY LIVING (ADL)
ADLS_ACUITY_SCORE: 30
ADLS_ACUITY_SCORE: 38
ADLS_ACUITY_SCORE: 38
ADLS_ACUITY_SCORE: 34
ADLS_ACUITY_SCORE: 30
ADLS_ACUITY_SCORE: 38
ADLS_ACUITY_SCORE: 34
ADLS_ACUITY_SCORE: 30

## 2023-10-07 ASSESSMENT — ENCOUNTER SYMPTOMS: DYSRHYTHMIAS: 1

## 2023-10-07 ASSESSMENT — COPD QUESTIONNAIRES: COPD: 1

## 2023-10-07 NOTE — ANESTHESIA PREPROCEDURE EVALUATION
Anesthesia Pre-Procedure Evaluation    Patient: Linda Loredo   MRN: [id number] : 1945        Procedure : Procedure(s):  LEFT GUILLOTINE BELOW KNEE AMPUTATION.          Past Medical History:   Diagnosis Date    Depressive disorder, not elsewhere classified     Herpes zoster without mention of complication     Hypercalcemia 2007    Mild intermittent asthma     Other urinary incontinence     Type II or unspecified type diabetes mellitus with renal manifestations, uncontrolled(250.42) (H)     Type II or unspecified type diabetes mellitus without mention of complication, not stated as uncontrolled     Unspecified essential hypertension       Past Surgical History:   Procedure Laterality Date    CHOLECYSTECTOMY      INCISION AND DRAINAGE FOOT, COMBINED Left 2023    Procedure: Surgical debridement of all nonviable skin and soft tissue and bone left foot;  Surgeon: Nolberto Thorne DPM;  Location: [facility]    IR LOWER EXTREMITY ANGIOGRAM LEFT  10/3/2023    ligation of fallopian tube      OPEN REDUCTION INTERNAL FIXATION ANKLE  10/13/11    left    pinning of left 2nd toe        Allergies   Allergen Reactions    Prednisone Nausea and Vomiting    Morphine Nausea and Vomiting    Morphine [Fumaric Acid] Nausea and Vomiting      Social History     Tobacco Use    Smoking status: Never    Smokeless tobacco: Never   Substance Use Topics    Alcohol use: No      Wt Readings from Last 1 Encounters:   10/03/23 117.7 kg (259 lb 7.7 oz)        Anesthesia Evaluation            ROS/MED HX  ENT/Pulmonary:     (+)                     asthma    COPD,              Neurologic:       Cardiovascular:     (+)  hypertension- -   -  - -                        dysrhythmias, a-fib,             METS/Exercise Tolerance:     Hematologic:       Musculoskeletal:       GI/Hepatic:       Renal/Genitourinary:     (+) renal disease, type: CRI,            Endo:     (+)  type II DM,             Obesity,       Psychiatric/Substance  Use:     (+)  1  H/O chronic opiod use .     Infectious Disease:       Malignancy:       Other:            Physical Exam    Airway  airway exam normal      Mallampati: II   TM distance: > 3 FB   Neck ROM: full   Mouth opening: > 3 cm    Respiratory Devices and Support         Dental       (+) Minor Abnormalities - some fillings, tiny chips      Cardiovascular   cardiovascular exam normal          Pulmonary   pulmonary [place] [address]    [phone] [email or web address]                                                  Transthoracic Echo Report    ALEXIA MERCEDES    Excellian ID: [id number] Age: 77 : 1945 Ordering Provider: GABBI MEADOWS    Exam Date: 2022 10:10 Gender: F Sonographer: NAD    Accession #: [id number] Height: 66 in BSA: 2.29 m  BP: 155 / 73    Weight: 274 lbs BMI: 44.2 kg/m       Location: Inpatient (Portable) Rhythm: Atrial Fibrillation    Procedure Components: 2D imaging with contrast, Color Doppler, Spectral Doppler    Indications: Atrial fibrillation    Technical Quality: Adequate Contrast: Definity    Constrast Dose (ml): 0.15      Final Conclusion Previous Study: 2019    1. Normal left ventricular chamber size.  Calculated left ventricular ejection fraction   (modified Pan technique) is 69 %.    2. Mildly increased left ventricular wall thickness.    3. Mild mitral valve regurgitation.    4. Estimated right ventricular systolic pressure is 47.6 mmHg.    5. The EF was normal on the previous echo also.      Estimated EF: 65-70%   OUTSIDE LABS:  CBC:   Lab Results   Component Value Date    WBC 6.1 10/06/2023    WBC 6.5 10/03/2023    HGB 8.6 (L) 10/06/2023    HGB 9.3 (L) 10/03/2023    HCT 27.1 (L) 10/06/2023    HCT 29.5 (L) 10/03/2023     10/06/2023     10/03/2023     BMP:   Lab Results   Component Value Date      10/02/2023     10/01/2023    POTASSIUM 3.8 10/02/2023    POTASSIUM 3.6 10/01/2023    CHLORIDE 101 10/02/2023    CHLORIDE 99 10/01/2023    CO2 30 (H) 10/02/2023    CO2 30 (H) 10/01/2023    BUN 24.3 (H) 10/02/2023    BUN 27.3 (H) 10/01/2023    CR 0.89 10/06/2023    CR 0.82 10/05/2023     (H) 10/07/2023     (H) 10/07/2023     COAGS:   Lab Results   Component Value Date    PTT 23 06/16/2006    INR 1.30 (H) 10/06/2023     POC:   Lab Results   Component Value Date     (H) 04/27/2006     HEPATIC:   Lab Results   Component Value Date    ALBUMIN 3.1 (L) 09/22/2023    PROTTOTAL 6.7 09/22/2023    ALT 43 09/22/2023    AST 90 (H) 09/22/2023    ALKPHOS 82 09/22/2023    BILITOTAL 0.6 09/22/2023     OTHER:   Lab Results   Component Value Date    LACT 0.9 05/26/2023    A1C 7.0 (H) 09/23/2023    ZAKIA 9.3 10/02/2023    PHOS 3.3 06/20/2005    MAG 1.7 05/31/2023    TSH 2.36 10/15/2007    T4 1.11 09/05/2007    CRP 16.0 (H) 03/28/2008    SED 32 (H) 03/28/2008       Anesthesia Plan    ASA Status:  3    NPO Status:  NPO Appropriate    Anesthesia Type: General.     - Airway: LMA   Induction: Intravenous.   Maintenance: Balanced.        Consents    Anesthesia Plan(s) and associated risks, benefits, and realistic alternatives discussed. Questions answered and patient/representative(s) expressed understanding.     - Discussed:     - Discussed with:  Patient            Postoperative Care    Pain management: IV analgesics, Oral pain medications, Peripheral nerve block (Single Shot).   PONV prophylaxis: Ondansetron (or other 5HT-3), Dexamethasone or Solumedrol, Background Propofol Infusion     Comments:                Danny Panda MD

## 2023-10-07 NOTE — PROGRESS NOTES
[facility]    Medicine Progress Note - Hospitalist Service    Date of Admission:  9/29/2023    Assessment & Plan   Linda Loredo is a 78 year old female admitted on 9/29/2023. She was transferred from [facility] for L heel osteomyelitis.  She underwent BKA on 10/7/23.     L foot decubitus ulcer status debridement by podiatry on 9/26 with multiple organisms, L heel osteomyelitis/cellulitis  Chronic venous stasis  MRSA positive hx  S/P BKA on 10/7/23 by Dr. Alvarado:  Summary:  Brought in 9/23 after welfare check by police found to have L leg swelling and erythema.  Hx venous stasis ulcers. Presents with L heel decubitus ulcer with osteo. Wound with multiple organisms incl pseudomonas, morganella, e faecalis, bordetella, proteus status underwent debridement 9/26 by podiatry.   Blood cultures done on 9/25/2023 have been negative.  ALMA w/ poor flow, podiatry at outside hospital requested transfer where there are vascular services.  Patient is currently on vancomycin and meropenem as per prior recommendations and status post PICC line placement on 9/27 and infectious disease, podiatry and vascular surgery team assisting.  After considering options she decided to proceed with amputation.    -  Appears to be doing well shortly after surgery when I saw her today.  Denied any uncontrolled pain.  She was hungry and looking forward to intake.  -  Remaining on IV meropenem and IV vanco until 24 hours post-amputation per ID recommendation.  -  Post-op site cares, anticoagulation, activity restrictions, pain meds per surgical team.  I will defer to vascular surgery timing when they feel anticoagulation can resume.  -  Trend hgb, some blood loss with surgery and prior hgb under 10     Acute on chronic pain syndrome:   - continue methadone 10 mg daily midday and 5 mg TID  Small dose po dilaudid prn for breakthrough pain   Suspect that she may have difficulty with post-op pain control though right  now doing well so will hold off on pain team consult, other med changes.      Urine culture positive for E faecalis, Staph aureus and E. Coli  -UA done at outside hospital on 9/23 showed above and patient is currently on broad-spectrum antibiotics including vancomycin and imipenem  -She is already on broad-spectrum antibiotics and continue the same.     DM II:  [PTA degludec 35 units at HS, metformin 2000 mg daily, ozempic]  A1c 7.0 9/23/23. Has been on insulin pump in the past but this was changed to tresiba 8/2023.   -We will continue to hold metformin, Ozempic   -We will continue with  long-acting insulin at reduced dose along with sliding scale insulin and monitor her sugar this morning is stable  -Continue with a slightly decreased dose of tresiba of 15 units at bedtime     Recent Labs   Lab 10/07/23  1107 10/07/23  1012 10/07/23  0545 10/07/23  0534 10/07/23  0206   * 105* 117* 127* 164*       Atrial fibrillation  HTN  HLD:  [PTA- warfarin, simvastatin 20 mg daily, lisinopril 5 mg daily]  -Continue with statin, warfarin currently is on hold - Had been on heparin drip up to surgery, will defer resumption timing to vascular surgery.     CKD III:  -Baseline creatinine 1-1.2.   -Stable, monitor occasionally     Anemia:  -Hgb at 9.3, baseline recent 9-10.       Failure to thrive:  As above, called by police for welfare check. ? Safety at home. SW had [facility].   -Social work team has been consulted, tentative plan is possible discharge to TCU depending on surgical recovery.     Obesity:  -  Complicates care    BERLIN:  -We will continue home cpap        Medical Decision Making       Over 50 MINUTES SPENT BY ME on the date of service doing chart review, history, exam, documentation & further activities per the note.      Labs/Imaging Reviewed:  See Information above and Data section below    PPE Used:  Mask, Gown/Gloves       Diet: Clear Liquid Diet    DVT Prophylaxis: Will defer to surgical team  Omi  "Catheter: Not present  Lines: PRESENT      PICC 09/27/23 Single Lumen Right Basilic antibiotics-Site Assessment: WDL      Cardiac Monitoring: None  Code Status: Full Code      Clinically Significant Risk Factors           # Hypercalcemia: Highest Ca = 10.2 mg/dL in last 2 days, will monitor as appropriate     # Coagulation Defect: INR = 1.20 (Ref range: 0.85 - 1.15) and/or PTT = N/A, will monitor for bleeding    # Hypertension: Noted on problem list       # DMII: A1C = 7.0 % (Ref range: <5.7 %) within past 6 months   # Severe Obesity: Estimated body mass index is 41.88 kg/m  as calculated from the following:    Height as of this encounter: 1.676 m (5' 6\").    Weight as of this encounter: 117.7 kg (259 lb 7.7 oz).             Disposition Plan      Expected Discharge Date: 10/13/2023      Destination: inpatient rehabilitation facility            Yvon Lezama DO  Hospitalist Service  [facility]  Securely message with [employer] (more info)  Text page via [employer] Paging/Directory   ______________________________________________________________________    Interval History   Ms. Billings doing well after surgery.  She reports pain controlled.  Denies increased SOB or nausea.  Looking forward to eating more.      Physical Exam   Vital Signs: Temp: 98.5  F (36.9  C) Temp src: Oral BP: 127/64 Pulse: 90   Resp: 19 SpO2: 95 % O2 Device: None (Room air) Oxygen Delivery: 10 LPM  Weight: 259 lbs 7.7 oz    GEN:  Alert, oriented x 3, appears comfortable, no overt distress.  HEENT:  Normocephalic/atraumatic, no scleral icterus, no nasal discharge, mouth moist.  CV:  Regular rate and rhythm, no loud murmur/rub.  LUNGS:  Clear to auscultation bilaterally without rales/rhonchi/wheezing/retractions.  Breath sounds mildly diminsihed bases.  Symmetric chest rise on inhalation noted.  ABD:  Active bowel sounds, soft, non-tender/non-distended.  No guarding/rigidity.  EXT:  Left BKA, detailed exam deferred to surgical " team.  No cyanosis.  SKIN:  Dry to touch, no new exanthems noted in the visualized areas.    Medications    [Held by provider] heparin Stopped (10/07/23 0103)    lactated ringers Stopped (10/07/23 0812)    - MEDICATION INSTRUCTIONS -      sodium chloride 75 mL/hr at 10/07/23 1147      acetaminophen  975 mg Oral Q8H    aspirin  81 mg Oral Daily    atorvastatin  40 mg Oral QPM    ceFAZolin  2 g Intravenous See Admin Instructions    insulin aspart  1-7 Units Subcutaneous TID AC    insulin aspart  1-5 Units Subcutaneous At Bedtime    insulin degludec  15 Units Subcutaneous At Bedtime    meropenem  500 mg Intravenous Q6H    methadone  10 mg Oral Daily    methadone  5 mg Oral TID    miconazole   Topical BID    [START ON 10/8/2023] polyethylene glycol  17 g Oral Daily    senna-docusate  1 tablet Oral BID    sodium chloride (PF)  10-40 mL Intracatheter Q8H    sodium chloride (PF)  3 mL Intracatheter Q8H    vancomycin  1,500 mg Intravenous Q24H       Data     Labs and Imaging results below reviewed today.    I have personally reviewed the following data over the past 24 hrs:    N/A  \   7.9 (L)   / N/A     139 104 16.8 /  113 (H)   4.0 27 0.90 \     INR:  1.20 (H) PTT:  N/A   D-dimer:  N/A Fibrinogen:  N/A       7-Day Micro Results       No results found for the last 168 hours.            No results found for this or any previous visit (from the past 24 hour(s)).

## 2023-10-07 NOTE — ANESTHESIA CARE TRANSFER NOTE
Patient: Linda Loredo    Procedure: Procedure(s):  LEFT GUILLOTINE BELOW KNEE AMPUTATION.       Diagnosis: Diabetic ulcer of left heel associated with type 2 diabetes mellitus, with muscle involvement without evidence of necrosis (H) [E11.621, L97.425]  Diagnosis Additional Information: No value filed.    Anesthesia Type:   General     Note:    Oropharynx: oropharynx clear of all foreign objects  Level of Consciousness: awake  Oxygen Supplementation: blow-by O2 and face mask    Independent Airway: airway patency satisfactory and stable  Dentition: dentition unchanged  Vital Signs Stable: post-procedure vital signs reviewed and stable  Report to RN Given: handoff report given  Patient transferred to: PACU    Handoff Report: Identifed the Patient, Identified the Reponsible Provider, Reviewed the pertinent medical history, Discussed the surgical course, Reviewed Intra-OP anesthesia mangement and issues during anesthesia, Set expectations for post-procedure period and Allowed opportunity for questions and acknowledgement of understanding      Vitals:  Vitals Value Taken Time   BP     Temp     Pulse 89 10/07/23 0848   Resp 9 10/07/23 0848   SpO2 97 % 10/07/23 0848   Vitals shown include unvalidated device data.    Electronically Signed By: MOE Ruffin CRNA  October 7, 2023  8:49 AM

## 2023-10-07 NOTE — PLAN OF CARE
Goal Outcome Evaluation:      Plan of Care Reviewed With: patient    Overall Patient Progress: improvingOverall Patient Progress: improving     Summary:  Cellulites/Osteomyelitis of left heel.     DATE & TIME: 10/6-07/2023 [id number]  Cognitive Concerns/ Orientation : A&OX4. Pleasant  BEHAVIOR & AGGRESSION TOOL COLOR: Green  CIWA SCORE: NA   ABNL VS/O2: VSS, on room air, afebrile  MOBILITY: Ax1 stand and Pivot to BSC, Rt heel NWB. Not  during this shift.   PAIN MANAGMENT: only c/o pain while pivoting, -  on scheduled methadone q6h.    DIET: Mod carb diet, NPO since midnight    BOWEL/BLADDER: Continent, prefers to use purewick while in bed.    ABNL LAB/BG:  hep 10A  0.46 (within goal range), recheck tomorrow, , 164, 117, Hgb 8.6, INR 1.20  DRAIN/DEVICES: PICC on R upper arm saline locked. Hepp drip stopped at 0100. PIV SL. Purewick in place.   TELEMETRY RHYTHM: NA  SKIN: Multiple wounds to BLE and coccyx area, dressings changed per WOC RN;  TESTS/PROCEDURES: Scheduled for Lt BKA at 0730. Hep gtt stopped at 0100   D/C DAY/GOALS/PLACE: Pending  OTHER IMPORTANT INFO: Pt agreeable to operative interventions, vascular team aware. On IV Merrem q6h and IV Vancomycin daily.

## 2023-10-07 NOTE — CONSULTS
Please see my earlier consult note dated 9/30/2023.  Patient is scheduled for left guillotine below-knee amputation today.    Torey Alvarado MD

## 2023-10-07 NOTE — BRIEF OP NOTE
[facility]    Brief Operative Note    Pre-operative diagnosis: Diabetic ulcer of left heel associated with type 2 diabetes mellitus, with muscle involvement without evidence of necrosis (H) [E11.621, L97.425]  Post-operative diagnosis Same as pre-operative diagnosis    Procedure: LEFT GUILLOTINE BELOW KNEE AMPUTATION., Left - Leg    Surgeon: Surgeon(s) and Role:     * Lan Otto MD - Primary     * Torey Alvarado MD - Resident - Assisting  Anesthesia: General   Estimated Blood Loss: 500 ml    Drains: Wound vac on left below-knee amputation stump    Specimens:   ID Type Source Tests Collected by Time Destination   1 : Left Foot and Ankle Tissue Foot, Left SURGICAL PATHOLOGY EXAM Lan Otto MD 10/7/2023  8:24 AM      Findings: Healthy muscle, pulsatile bleeding from all three named tibial arteries.   Complications: None.  Implants: * No implants in log *

## 2023-10-07 NOTE — ANESTHESIA PROCEDURE NOTES
Airway         Procedure Start/Stop Times: 10/7/2023 8:01 AM  Staff -        Anesthesiologist:  Danny Panda MD       CRNA: Nolvia Castillo APRN CRNA       Performed By: CRNA  Consent for Airway        Urgency: elective  Indications and Patient Condition       Indications for airway management: sreekanth-procedural       Induction type:intravenous       Mask difficulty assessment: 0 - not attempted    Final Airway Details       Final airway type: supraglottic airway    Supraglottic Airway Details        Type: LMA       Brand: I-Gel       LMA size: 5    Post intubation assessment        Placement verified by: capnometry, equal breath sounds and chest rise        Number of attempts at approach: 1       Number of other approaches attempted: 0       Secured with: cloth tape       Ease of procedure: easy       Dentition: Intact and Unchanged    Medication(s) Administered   Medication Administration Time: 10/7/2023 8:01 AM

## 2023-10-07 NOTE — ANESTHESIA POSTPROCEDURE EVALUATION
Patient: Linda Loredo    Procedure: Procedure(s):  LEFT GUILLOTINE BELOW KNEE AMPUTATION.       Anesthesia Type:  General    Note:  Disposition: Inpatient   Postop Pain Control: Uneventful            Sign Out: Well controlled pain   PONV: No   Neuro/Psych: Uneventful            Sign Out: Acceptable/Baseline neuro status   Airway/Respiratory: Uneventful            Sign Out: Acceptable/Baseline resp. status   CV/Hemodynamics: Uneventful            Sign Out: Acceptable CV status   Other NRE: NONE   DID A NON-ROUTINE EVENT OCCUR? No           Last vitals:  Vitals Value Taken Time   /82 10/07/23 0930   Temp 36.7  C (98  F) 10/07/23 0850   Pulse 82 10/07/23 0937   Resp 0 10/07/23 0937   SpO2 100 % 10/07/23 0937   Vitals shown include unvalidated device data.    Electronically Signed By: Danny Panda MD  October 7, 2023  9:38 AM

## 2023-10-07 NOTE — ANESTHESIA PROCEDURE NOTES
Popliteal (Sciatic nerve block above the popliteal fossa.) Procedure Note    Pre-Procedure   Staff -        Anesthesiologist:  Danny Panda MD       Performed By: anesthesiologist       Location: pre-op       Procedure Start/Stop Times: 10/7/2023 7:21 AM and 10/7/2023 7:25 AM       Pre-Anesthestic Checklist: patient identified, IV checked, site marked, risks and benefits discussed, informed consent, monitors and equipment checked, pre-op evaluation, at physician/surgeon's request and post-op pain management  Timeout:       Correct Patient: Yes        Correct Procedure: Yes        Correct Site: Yes        Correct Position: Yes        Correct Laterality: Yes        Site Marked: Yes  Procedure Documentation  Procedure: Popliteal (Sciatic nerve block above the popliteal fossa.)       Laterality: left       Patient Position: supine       Patient Prep/Sterile Barriers: sterile gloves, mask       Skin prep: Chloraprep       Local skin infiltrated with 2 mL of 1% lidocaine.        Needle Type: insulated       Needle Gauge: 21.        Needle Length (millimeters): 90        Ultrasound guided       1. Ultrasound was used to identify targeted nerve, plexus, vascular marker, or fascial plane and place a needle adjacent to it in real-time.       2. Ultrasound was used to visualize the spread of anesthetic in close proximity to the above referenced structure.       3. A permanent image is entered into the patient's record.       5. There were no apparent abnormal pathologic findings.    Assessment/Narrative         The placement was negative for: blood aspirated and painful injection       Paresthesias: No.       Bolus given via needle..        Secured via.        Insertion/Infusion Method: Single Shot       Complications: none       Injection made incrementally with aspirations every 5 mL.    Medication(s) Administered   Bupivacaine 0.5% w/ 1:400K Epi (Injection) - Injection   23 mL - 10/7/2023 7:21:00  "AM  Medication Administration Time: 10/7/2023 7:21 AM     Comments:  Pt tolerated well.    No complications.      The surgeon has given a verbal order transferring care of this patient to me for the performance of a regional analgesia block for post-op pain control. It is requested of me because I am uniquely trained and qualified to perform this block and the surgeon is neither trained nor qualified to perform this procedure.    Danny Panda MD   8:07 AM      FOR [facility] ([facility]) ONLY:   Pain Team Contact information: please page the Pain Team Via [employer]. Search \"Pain\". During daytime hours, please page the attending first. At night please page the resident first.      "

## 2023-10-07 NOTE — OP NOTE
Preoperative diagnosis: Left lower extremity calcaneal osteomyelitis with soft tissue defect, nonsalvageable left lower extremity.    Postoperative diagnosis: Same.    Procedure: 1.  Left transtibial below the knee (low tibial) guillotine amputation.   2.  Application of wound VAC.    Surgeon: Lan Otto M.D.   Assistant: Torey Alvarado M.D.     Anesthesia: Regional plus general.  Estimated blood loss: About 500 mL.  Specimens: Left distal leg, foot and ankle.  Drains: Wound VAC was applied.    Indication for procedure: This is a chronically ill 78-year-old female with calcaneal osteomyelitis.  She does not have significant peripheral arterial disease.  However, after much deliberation and discussion between the vascular surgery team, infectious disease team and podiatry team the left foot and ankle a nonsalvageable.  Below-knee amputation is advised.  Due to the chronicity of the infection I proposed staging this with a guillotine amputation.    Procedure details: Patient was identified and then taken to the operating room and placed in supine position.  She had received regional anesthesia of the left lower extremity.  Preoperative intravenous antibiotics were administered.  Left lower extremity was prepped and a sterile surgical field was created.    Preprocedure timeout was conducted.  A sterile vial tourniquet was applied just below the knee.  It was then inflated to 250 mmHg.  About 12 cm proximal to the ankle joint a circumferential incision was made with a #10 blade and deepened with the knife.  All the soft tissue was divided with a knife.  Then with a Gigli saw both the bones were divided.  In spite of the tourniquet being inflated to 250 mmHg she had profuse arterial bleeding through the tibial vessels.  These were first secured with figure-of-eight sutures of 2-0 Vicryl and then with 0 Ethibond sutures.  Tourniquet was then deflated.  Bone wax was applied to the cut edges of the tibia and the fibula.   All the soft tissue was then carefully examined and bleeding was controlled with electrocautery.  There was significant amount of subcutaneous edema in her soft tissues specially in the subcutaneous fat area.    Wound VAC sponge was applied and held in position with Curlex and then activated with good seal.    Counts of instruments, sponges and needle was noted to be correct.    Patient was awakened and extubated and taken to the recovery room in stable condition.    I went outside and spoke with the patient's family and updated them on our progress.

## 2023-10-07 NOTE — CONSULTS
Please see initial consult dated 10/3/2023. Case management will follow along for disposition needs and watch for therapy recommendations.     Stephanie Gomes RN   [facility]   Phone [phone] or [phone]

## 2023-10-07 NOTE — PROGRESS NOTES
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   POD#: 0  Mental Status: Ox4  Activity: Ax2 CL  Diet: CLD, tolerating  Pain: Controlled w/ PO pain meds  /GI: Continent of bowel. Incontinent of bladder. Voiding adequately.   Tele/Restraints/Iso: Contcat for MRSA  LDA: PICC infusing w/ NS at 75ml/hr w/ intermittent IV abx. Wound vac from L amputation at 125 pressure, continuous.   Expected D/C Date: TBD  Other Info: On hep gtt at 1400 unit(s)/hr. Hgb 7.8. Overall doing well shortly after surgery and during this shift.  Hx of debridement of all nonviable skin and soft tissue and bone left foot, Left - Leg 9/26.  Hx of venous stasis ulcers, DM, afib, CKD and anemia.

## 2023-10-07 NOTE — ANESTHESIA PROCEDURE NOTES
Femoral Procedure Note    Pre-Procedure   Staff -        Anesthesiologist:  Danny Panda MD       Performed By: anesthesiologist       Location: pre-op       Procedure Start/Stop Times: 10/7/2023 7:26 AM and 10/7/2023 7:29 AM       Pre-Anesthestic Checklist: patient identified, IV checked, site marked, risks and benefits discussed, informed consent, monitors and equipment checked, pre-op evaluation, at physician/surgeon's request and post-op pain management  Timeout:       Correct Patient: Yes        Correct Procedure: Yes        Correct Site: Yes        Correct Position: Yes        Correct Laterality: Yes   Procedure Documentation  Procedure: Femoral       Laterality: left       Patient Position: supine       Patient Prep/Sterile Barriers: sterile gloves, mask       Skin prep: Chloraprep       Local skin infiltrated with mL of 1% lidocaine.        Needle Type: insulated and short bevel       Needle Gauge: 21.        Needle Length (millimeters): 100        Ultrasound guided       1. Ultrasound was used to identify targeted nerve, plexus, vascular marker, or fascial plane and place a needle adjacent to it in real-time.       2. Ultrasound was used to visualize the spread of anesthetic in close proximity to the above referenced structure.       3. A permanent image is entered into the patient's record.       4. The visualized anatomic structures appeared normal.       5. There were no apparent abnormal pathologic findings.    Assessment/Narrative         The placement was negative for: blood aspirated, painful injection and site bleeding       Paresthesias: No.       Bolus given via needle..        Secured via.        Insertion/Infusion Method: Single Shot       Complications: none       Injection made incrementally with aspirations every 5 mL.    Medication(s) Administered   Bupivacaine 0.5% w/ 1:200K Epi (Other) - Other   15 mL - 10/7/2023 7:26:00 AM  Medication Administration Time: 10/7/2023 7:26 AM    "  Comments:  Attempted adductor canal on L first. Nerve and femoral a, however, very deep, unable to adequately visualize needle. Opted for femoral n block instead.    Pt tolerated well.    No complications.      The surgeon has given a verbal order transferring care of this patient to me for the performance of a regional analgesia block for post-op pain control. It is requested of me because I am uniquely trained and qualified to perform this block and the surgeon is neither trained nor qualified to perform this procedure.    Danny Panda MD   8:08 AM      FOR [facility] ([facility]) ONLY:   Pain Team Contact information: please page the Pain Team Via [employer]. Search \"Pain\". During daytime hours, please page the attending first. At night please page the resident first.      "

## 2023-10-08 LAB
ANION GAP SERPL CALCULATED.3IONS-SCNC: 7 MMOL/L (ref 7–15)
BUN SERPL-MCNC: 14.2 MG/DL (ref 8–23)
CALCIUM SERPL-MCNC: 9.9 MG/DL (ref 8.8–10.2)
CHLORIDE SERPL-SCNC: 104 MMOL/L (ref 98–107)
CREAT SERPL-MCNC: 0.93 MG/DL (ref 0.51–0.95)
DEPRECATED HCO3 PLAS-SCNC: 28 MMOL/L (ref 22–29)
EGFRCR SERPLBLD CKD-EPI 2021: 63 ML/MIN/1.73M2
ERYTHROCYTE [DISTWIDTH] IN BLOOD BY AUTOMATED COUNT: 13.3 % (ref 10–15)
GLUCOSE BLDC GLUCOMTR-MCNC: 108 MG/DL (ref 70–99)
GLUCOSE BLDC GLUCOMTR-MCNC: 118 MG/DL (ref 70–99)
GLUCOSE BLDC GLUCOMTR-MCNC: 152 MG/DL (ref 70–99)
GLUCOSE BLDC GLUCOMTR-MCNC: 155 MG/DL (ref 70–99)
GLUCOSE BLDC GLUCOMTR-MCNC: 83 MG/DL (ref 70–99)
GLUCOSE SERPL-MCNC: 93 MG/DL (ref 70–99)
HCT VFR BLD AUTO: 23.9 % (ref 35–47)
HGB BLD-MCNC: 7.4 G/DL (ref 11.7–15.7)
HOLD SPECIMEN: NORMAL
HOLD SPECIMEN: NORMAL
INR PPP: 1.32 (ref 0.85–1.15)
MCH RBC QN AUTO: 30 PG (ref 26.5–33)
MCHC RBC AUTO-ENTMCNC: 31 G/DL (ref 31.5–36.5)
MCV RBC AUTO: 97 FL (ref 78–100)
PLATELET # BLD AUTO: 174 10E3/UL (ref 150–450)
POTASSIUM SERPL-SCNC: 4.1 MMOL/L (ref 3.4–5.3)
RBC # BLD AUTO: 2.47 10E6/UL (ref 3.8–5.2)
SODIUM SERPL-SCNC: 139 MMOL/L (ref 135–145)
UFH PPP CHRO-ACNC: 0.24 IU/ML
UFH PPP CHRO-ACNC: 0.5 IU/ML
UFH PPP CHRO-ACNC: 0.68 IU/ML
WBC # BLD AUTO: 5.1 10E3/UL (ref 4–11)

## 2023-10-08 PROCEDURE — 85520 HEPARIN ASSAY: CPT | Performed by: STUDENT IN AN ORGANIZED HEALTH CARE EDUCATION/TRAINING PROGRAM

## 2023-10-08 PROCEDURE — 250N000011 HC RX IP 250 OP 636: Performed by: STUDENT IN AN ORGANIZED HEALTH CARE EDUCATION/TRAINING PROGRAM

## 2023-10-08 PROCEDURE — 250N000013 HC RX MED GY IP 250 OP 250 PS 637: Performed by: STUDENT IN AN ORGANIZED HEALTH CARE EDUCATION/TRAINING PROGRAM

## 2023-10-08 PROCEDURE — 80048 BASIC METABOLIC PNL TOTAL CA: CPT | Performed by: STUDENT IN AN ORGANIZED HEALTH CARE EDUCATION/TRAINING PROGRAM

## 2023-10-08 PROCEDURE — 120N000001 HC R&B MED SURG/OB

## 2023-10-08 PROCEDURE — 85520 HEPARIN ASSAY: CPT | Performed by: INTERNAL MEDICINE

## 2023-10-08 PROCEDURE — 250N000011 HC RX IP 250 OP 636: Mod: JZ | Performed by: STUDENT IN AN ORGANIZED HEALTH CARE EDUCATION/TRAINING PROGRAM

## 2023-10-08 PROCEDURE — 258N000003 HC RX IP 258 OP 636: Performed by: INTERNAL MEDICINE

## 2023-10-08 PROCEDURE — 36415 COLL VENOUS BLD VENIPUNCTURE: CPT | Performed by: INTERNAL MEDICINE

## 2023-10-08 PROCEDURE — 99233 SBSQ HOSP IP/OBS HIGH 50: CPT | Performed by: INTERNAL MEDICINE

## 2023-10-08 PROCEDURE — 250N000011 HC RX IP 250 OP 636: Performed by: INTERNAL MEDICINE

## 2023-10-08 PROCEDURE — 85610 PROTHROMBIN TIME: CPT | Performed by: STUDENT IN AN ORGANIZED HEALTH CARE EDUCATION/TRAINING PROGRAM

## 2023-10-08 PROCEDURE — 85027 COMPLETE CBC AUTOMATED: CPT | Performed by: STUDENT IN AN ORGANIZED HEALTH CARE EDUCATION/TRAINING PROGRAM

## 2023-10-08 PROCEDURE — 250N000012 HC RX MED GY IP 250 OP 636 PS 637: Performed by: STUDENT IN AN ORGANIZED HEALTH CARE EDUCATION/TRAINING PROGRAM

## 2023-10-08 RX ORDER — LIDOCAINE 40 MG/G
CREAM TOPICAL
Status: ACTIVE | OUTPATIENT
Start: 2023-10-08 | End: 2023-10-11

## 2023-10-08 RX ORDER — MEROPENEM 500 MG/1
500 INJECTION, POWDER, FOR SOLUTION INTRAVENOUS EVERY 6 HOURS
Status: COMPLETED | OUTPATIENT
Start: 2023-10-08 | End: 2023-10-09

## 2023-10-08 RX ADMIN — INSULIN ASPART 1 UNITS: 100 INJECTION, SOLUTION INTRAVENOUS; SUBCUTANEOUS at 18:30

## 2023-10-08 RX ADMIN — ACETAMINOPHEN 975 MG: 325 TABLET, FILM COATED ORAL at 11:09

## 2023-10-08 RX ADMIN — MICONAZOLE NITRATE: 2 POWDER TOPICAL at 21:11

## 2023-10-08 RX ADMIN — MEROPENEM 500 MG: 500 INJECTION, POWDER, FOR SOLUTION INTRAVENOUS at 18:30

## 2023-10-08 RX ADMIN — METHADONE HYDROCHLORIDE 5 MG: 5 TABLET ORAL at 21:07

## 2023-10-08 RX ADMIN — METHADONE HYDROCHLORIDE 10 MG: 10 TABLET ORAL at 14:09

## 2023-10-08 RX ADMIN — MEROPENEM 500 MG: 500 INJECTION, POWDER, FOR SOLUTION INTRAVENOUS at 07:11

## 2023-10-08 RX ADMIN — METHADONE HYDROCHLORIDE 5 MG: 5 TABLET ORAL at 06:05

## 2023-10-08 RX ADMIN — METHADONE HYDROCHLORIDE 5 MG: 5 TABLET ORAL at 11:10

## 2023-10-08 RX ADMIN — ACETAMINOPHEN 975 MG: 325 TABLET, FILM COATED ORAL at 03:57

## 2023-10-08 RX ADMIN — MICONAZOLE NITRATE: 2 POWDER TOPICAL at 09:05

## 2023-10-08 RX ADMIN — HEPARIN SODIUM 1250 UNITS/HR: 10000 INJECTION, SOLUTION INTRAVENOUS at 13:28

## 2023-10-08 RX ADMIN — ACETAMINOPHEN 975 MG: 325 TABLET, FILM COATED ORAL at 21:06

## 2023-10-08 RX ADMIN — ASPIRIN 81 MG: 81 TABLET, COATED ORAL at 09:05

## 2023-10-08 RX ADMIN — SENNOSIDES AND DOCUSATE SODIUM 1 TABLET: 8.6; 5 TABLET ORAL at 09:05

## 2023-10-08 RX ADMIN — MEROPENEM 500 MG: 500 INJECTION, POWDER, FOR SOLUTION INTRAVENOUS at 13:29

## 2023-10-08 RX ADMIN — VANCOMYCIN HYDROCHLORIDE 1500 MG: 10 INJECTION, POWDER, LYOPHILIZED, FOR SOLUTION INTRAVENOUS at 21:13

## 2023-10-08 RX ADMIN — ATORVASTATIN CALCIUM 40 MG: 40 TABLET, FILM COATED ORAL at 21:06

## 2023-10-08 ASSESSMENT — ACTIVITIES OF DAILY LIVING (ADL)
ADLS_ACUITY_SCORE: 34
ADLS_ACUITY_SCORE: 33
ADLS_ACUITY_SCORE: 33
ADLS_ACUITY_SCORE: 34
ADLS_ACUITY_SCORE: 33
ADLS_ACUITY_SCORE: 34
ADLS_ACUITY_SCORE: 33
ADLS_ACUITY_SCORE: 34
ADLS_ACUITY_SCORE: 33
ADLS_ACUITY_SCORE: 34

## 2023-10-08 NOTE — PROGRESS NOTES
Date & Time: 0700--1930  Surgery/POD#: PO 2 BKA  Behavior & Aggression: Green  Fall Risk: Yes  Orientation:x4  ABNL VS/O2:On 2L NC  ABNL Labs: See chart  Pain Management:Methadone scheduled   Bowel/Bladder: Continent, ext cath in place   Drains: Wound vac, PICC, PIV  Diet:Mod carb diet  Activity Level: Ax2  Tests/Procedures: NA  Anticipated  DC Date: Pending  Significant Information: Heparin gtt infusing, recheck 1930.

## 2023-10-08 NOTE — PLAN OF CARE
10/03 1900 - 2300  Orientation: AOx4    Vitals/Tele: VSS 4 LPM    IV Access/drains: PICC line; No PIV, multiple attempts, vascular consult placed; Hep gtt running at 1400 units/hr    Diet: Clear liquid    Mobility: Ax2 ceiling lift    GI/: Incontinent of urine; PW in place    Wound/Skin: Redness under abdominal folds and sreekanth area. Multiple wounds to BLE and coccyx. Mepi applied to coccyx    Discharge Plan: Pending    Other: L BKA; IV abx; Vanco levels drawn, okay to restart when IV access is obtained; Hep Xa draw due at 0100    See Flow sheets for assessment

## 2023-10-08 NOTE — PROGRESS NOTES
While VAT unavailable, patient underwent multiple peripheral IV attempts by other staff.  If patient to continue multiple IV meds, VAT recommends PICC exchange for double or triple lumen to offset number of peripheral lines patient has needed.  Patient amenable to this plan.

## 2023-10-08 NOTE — PROGRESS NOTES
[facility]    Medicine Progress Note - Hospitalist Service    Date of Admission:  9/29/2023    Assessment & Plan   Linda Loredo is a 78 year old female admitted on 9/29/2023. She was transferred from [facility] for L heel osteomyelitis.  She underwent BKA on 10/7/23.  Plan to return to OR mid-week.     L foot decubitus ulcer status debridement by podiatry on 9/26 with multiple organisms, L heel osteomyelitis/cellulitis  Chronic venous stasis  MRSA positive hx  S/P BKA on 10/7/23 by Dr. Alvarado  Acute blood loss anemia:  Summary:  Brought in 9/23 after welfare check by police found to have L leg swelling and erythema.  Hx venous stasis ulcers. Presents with L heel decubitus ulcer with osteo. Wound with multiple organisms incl pseudomonas, morganella, e faecalis, bordetella, proteus status underwent debridement 9/26 by podiatry.   Blood cultures done on 9/25/2023 have been negative.  ALMA w/ poor flow, podiatry at outside hospital requested transfer where there are vascular services.  Patient is currently on vancomycin and meropenem as per prior recommendations and status post PICC line placement on 9/27 and infectious disease, podiatry and vascular surgery team assisting.  After considering options she decided to proceed with amputation.    -  Appears to be doing well, surgical team plans to have her return to OR mid-week for closure by report.  Remaining on IV meropenem and IV vanco until 24 hours post-amputation per ID recommendation.  I will schedule the last to doses for this evening and then they will stop.  -  Post-op site cares, anticoagulation, activity restrictions, pain meds per surgical team.   -  Remains on heparin drip, no plan to move to oral until after follow-up surgery this week.  -  Trend hgb, some blood loss with surgery and prior hgb under 10.  No current need for transfusion.  Monitor.     Acute on chronic pain syndrome:   - continue methadone 10 mg daily  midday and 5 mg TID  Small dose po dilaudid prn for breakthrough pain      Urine culture positive for E faecalis, Staph aureus and E. Coli  -UA done at outside hospital on 9/23 showed above and patient is currently on broad-spectrum antibiotics including vancomycin and imipenem  -She is already on broad-spectrum antibiotics and continue the same.     DM II:  [PTA degludec 35 units at HS, metformin 2000 mg daily, ozempic]  A1c 7.0 9/23/23. Has been on insulin pump in the past but this was changed to tresiba 8/2023.   -We will continue to hold metformin, Ozempic   -We will continue with  long-acting insulin at reduced dose along with sliding scale insulin and monitor her sugar this morning is stable  -Continue with a slightly decreased dose of tresiba of 15 units at bedtime     Recent Labs   Lab 10/08/23  0815 10/08/23  0603 10/08/23  0127 10/07/23  2157 10/07/23  1650   GLC 83 93 108* 141* 107*   -  Was on clears still this AM. Tolerating and wants more food. Will move back to mod consistent carb diet.    Atrial fibrillation  HTN  HLD:  [PTA- warfarin, simvastatin 20 mg daily, lisinopril 5 mg daily]  -Continue with statin, warfarin currently is on hold - Had been on heparin drip up to surgery, will defer resumption timing to vascular surgery.     CKD III:  -Baseline creatinine 1-1.2.   -Stable, monitor occasionally     Failure to thrive:  As above, called by police for welfare check. ? Safety at home. SW had seen at [facility].   -Social work team has been consulted, tentative plan is possible discharge to TCU depending on surgical recovery.     Obesity:  -  Complicates care    [place]:  -We will continue home cpap        Medical Decision Making       Over 50 MINUTES SPENT BY ME on the date of service doing chart review, history, exam, documentation & further activities per the note.      Labs/Imaging Reviewed:  See Information above and Data section below    PPE Used:  Mask, Gown/Gloves       Diet: Clear Liquid Diet    DVT  Prophylaxis: Will defer to surgical team  Braga Catheter: Not present  Lines: PRESENT      PICC 09/27/23 Single Lumen Right Basilic antibiotics-Site Assessment: WDL      Cardiac Monitoring: None  Code Status: Full Code      Clinically Significant Risk Factors          Disposition Plan      Expected Discharge Date: 10/13/2023      Destination: inpatient rehabilitation facility  Discharge Comments: amputation closure this week.          Yvon Lezama, DO  Hospitalist Service  [facility]  Securely message with [employer] (more info)  Text page via [employer] [email or web address]) Paging/Directory   ______________________________________________________________________    Interval History   Ms. Billings without any new complaints.  On clear liquids still and tolerating well.  She wants a regular diabetic diet.  Feels pain controlled/stable.    Physical Exam   Vital Signs: Temp: 98.1  F (36.7  C) Temp src: Oral BP: 117/65 Pulse: 67   Resp: 18 SpO2: 97 % O2 Device: Nasal cannula Oxygen Delivery: 4 LPM  Weight: 251 lbs 15.77 oz    GEN:  Alert, oriented x 3, appears comfortable, no overt distress.  HEENT:  Normocephalic/atraumatic, no scleral icterus, no nasal discharge, mouth moist.  CV:  Regular rate and rhythm, no loud murmur/rub.  LUNGS:  Clear to auscultation bilaterally without rales/rhonchi/wheezing/retractions.  Breath sounds mildly diminsihed toward bases.  Symmetric chest rise on inhalation noted.  ABD:  Active bowel sounds, soft, non-tender/non-distended.  No guarding/rigidity.  EXT:  Left BKA, detailed exam deferred to surgical team.   SKIN:  Dry to touch, no new exanthems noted in the visualized areas.    Medications    heparin 1,100 Units/hr (10/08/23 0346)    lactated ringers Stopped (10/07/23 0812)    - MEDICATION INSTRUCTIONS -      sodium chloride Stopped (10/07/23 2023)      acetaminophen  975 mg Oral Q8H    aspirin  81 mg Oral Daily    atorvastatin  40 mg Oral QPM    insulin aspart  1-7 Units Subcutaneous TID  AC    insulin aspart  1-5 Units Subcutaneous At Bedtime    insulin degludec  15 Units Subcutaneous At Bedtime    meropenem  500 mg Intravenous Q6H    methadone  10 mg Oral Daily    methadone  5 mg Oral TID    miconazole   Topical BID    polyethylene glycol  17 g Oral Daily    senna-docusate  1 tablet Oral BID    sodium chloride (PF)  10-40 mL Intracatheter Q8H    sodium chloride (PF)  3 mL Intracatheter Q8H    vancomycin  1,500 mg Intravenous Q24H       Data     Labs and Imaging results below reviewed today.    I have personally reviewed the following data over the past 24 hrs:    5.1  \   7.4 (L)   / 174     139 104 14.2 /  83   4.1 28 0.93 \     INR:  1.32 (H) PTT:  N/A   D-dimer:  N/A Fibrinogen:  N/A       7-Day Micro Results       No results found for the last 168 hours.            No results found for this or any previous visit (from the past 24 hour(s)).

## 2023-10-08 NOTE — PHARMACY-VANCOMYCIN DOSING SERVICE
Pharmacy Vancomycin Note  Date of Service 2023  Patient's  1945   78 year old, female    Indication: Skin and Soft Tissue Infection  Day of Therapy: 15 (started  at [facility])  Current vancomycin regimen:  1500 mg IV q24h  Current vancomycin monitoring method: AUC  Current vancomycin therapeutic monitoring goal: 400-600 mg*h/L    Insight[employer] Prediction of Current Vancomycin Regimen  Loading dose: N/A  Regimen: 1500 mg IV every 24 hours.  Start time: 21:15 on 10/07/2023  Exposure target: AUC24 (range)400-600 mg/L.hr   AUC24,ss: 506 mg/L.hr  Probability of AUC24 > 400: 99 %  Ctrough,ss: 17.5 mg/L  Probability of Ctrough,ss > 20: 4 %  Probability of nephrotoxicity (Lodise COSTA ): 13 %    Current estimated CrCl = Estimated Creatinine Clearance: 67.3 mL/min (based on SCr of 0.9 mg/dL).    Creatinine for last 3 days  10/5/2023:  6:55 AM Creatinine 0.82 mg/dL  10/6/2023:  6:24 AM Creatinine 0.89 mg/dL  10/7/2023:  5:34 AM Creatinine 0.90 mg/dL    Recent Vancomycin Levels (past 3 days)  10/7/2023:  8:16 PM Vancomycin 18.3 ug/mL    Vancomycin IV Administrations (past 72 hours)                     vancomycin (VANCOCIN) 1,500 mg in 0.9% NaCl 250 mL intermittent infusion (mg) 1,500 mg New Bag 10/06/23 2049     1,500 mg New Bag 10/05/23 1942                    Nephrotoxins and other renal medications (From now, onward)      Start     Dose/Rate Route Frequency Ordered Stop    10/04/23 2000  vancomycin (VANCOCIN) 1,500 mg in 0.9% NaCl 250 mL intermittent infusion        Note to Pharmacy: Interval changed to q18h per pharmacy protocol.    1,500 mg  over 90 Minutes Intravenous EVERY 24 HOURS 10/04/23 185                 Contrast Orders - past 72 hours (72h ago, onward)      None            Interpretation of levels and current regimen:  Vancomycin level is reflective of -600    Has serum creatinine changed greater than 50% in last 72 hours: No    Urine output:  good urine output    Renal Function:  Stable    Insight[employer] Prediction of Planned New Vancomycin Regimen  Regimen: 1500 mg IV every 24 hours.  Start time: 21:15 on 10/07/2023  Exposure target: AUC24 (range)400-600 mg/L.hr   AUC24,ss: 506 mg/L.hr  Probability of AUC24 > 400: 99 %  Ctrough,ss: 17.5 mg/L  Probability of Ctrough,ss > 20: 4 %  Probability of nephrotoxicity (Lodise COSTA 2009): 13 %    Plan:  Continue Current Dose  Vancomycin monitoring method: AUC  Vancomycin therapeutic monitoring goal: 400-600 mg*h/L  Pharmacy will check vancomycin levels as appropriate in  changes in renal function .  Serum creatinine levels will be ordered every 48 hours.    Najma Martinez RP

## 2023-10-08 NOTE — PLAN OF CARE
"Goal Outcome Evaluation:    Patient is A&O X4, VSS on 4L. POD 1, Left BKA. C/o moderate pain managed with schedule Tylenol and Methadone, declines other pain medications offered. Right upper arm PICC infusing heparin at 1100 units/hr, recheck due at 0939 AM. Left hand IV SL with intermittent antibiotics. Wound on Coccyx, left thigh, right heel and right calf, Mepilex in place. Abdominal and pelvic redness, powder applied. Tolerating clear liquid. Purewick in place, voiding adequately, ramona output. Assist of 2, patient refusing repositioning, states \"Just give me the pillow, I can do it myself. Wound Vac in place on contentious at 125. Continue with plan of care.       "

## 2023-10-08 NOTE — PROGRESS NOTES
"Vascular Surgery Progress Note    No acute issues overnight. Pain generally controlled.    BP 99/46 (BP Location: Left arm, Cuff Size: Adult Regular)   Pulse 68   Temp 97.7  F (36.5  C) (Oral)   Resp 14   Ht 1.676 m (5' 6\")   Wt 114.3 kg (251 lb 15.8 oz)   SpO2 95%   BMI 40.67 kg/m      Resting in bed  L BKA vac intact, ~200 ml in vac canister, no leak  Visibile skin without erythema    Hgb 7.4 from 7.9  plts 174  Wbc 5.1  BMP unremarkable    A/P: POD#1 s/p left guillotine BKA. Recovering well.   Plan to continue heparin drip bridge for A fib. Monitor wound vac. Continue abx as per ID. Tentatively plan for completion BKA this week.    D/w Dr. Keegan Alvarado MD    "

## 2023-10-09 ENCOUNTER — APPOINTMENT (OUTPATIENT)
Dept: GENERAL RADIOLOGY | Facility: CLINIC | Age: 78
DRG: 617 | End: 2023-10-09
Attending: STUDENT IN AN ORGANIZED HEALTH CARE EDUCATION/TRAINING PROGRAM
Payer: COMMERCIAL

## 2023-10-09 ENCOUNTER — APPOINTMENT (OUTPATIENT)
Dept: PHYSICAL THERAPY | Facility: CLINIC | Age: 78
DRG: 617 | End: 2023-10-09
Attending: HOSPITALIST
Payer: COMMERCIAL

## 2023-10-09 LAB
ANION GAP SERPL CALCULATED.3IONS-SCNC: 9 MMOL/L (ref 7–15)
BUN SERPL-MCNC: 15.2 MG/DL (ref 8–23)
CALCIUM SERPL-MCNC: 9.6 MG/DL (ref 8.8–10.2)
CHLORIDE SERPL-SCNC: 103 MMOL/L (ref 98–107)
CREAT SERPL-MCNC: 0.96 MG/DL (ref 0.51–0.95)
DEPRECATED HCO3 PLAS-SCNC: 29 MMOL/L (ref 22–29)
EGFRCR SERPLBLD CKD-EPI 2021: 60 ML/MIN/1.73M2
ERYTHROCYTE [DISTWIDTH] IN BLOOD BY AUTOMATED COUNT: 13.3 % (ref 10–15)
GLUCOSE BLDC GLUCOMTR-MCNC: 131 MG/DL (ref 70–99)
GLUCOSE BLDC GLUCOMTR-MCNC: 145 MG/DL (ref 70–99)
GLUCOSE BLDC GLUCOMTR-MCNC: 146 MG/DL (ref 70–99)
GLUCOSE BLDC GLUCOMTR-MCNC: 146 MG/DL (ref 70–99)
GLUCOSE BLDC GLUCOMTR-MCNC: 164 MG/DL (ref 70–99)
GLUCOSE SERPL-MCNC: 165 MG/DL (ref 70–99)
HCT VFR BLD AUTO: 23.3 % (ref 35–47)
HGB BLD-MCNC: 7.4 G/DL (ref 11.7–15.7)
INR PPP: 1.37 (ref 0.85–1.15)
MCH RBC QN AUTO: 31 PG (ref 26.5–33)
MCHC RBC AUTO-ENTMCNC: 31.8 G/DL (ref 31.5–36.5)
MCV RBC AUTO: 98 FL (ref 78–100)
PLATELET # BLD AUTO: 174 10E3/UL (ref 150–450)
POTASSIUM SERPL-SCNC: 4.4 MMOL/L (ref 3.4–5.3)
RBC # BLD AUTO: 2.39 10E6/UL (ref 3.8–5.2)
SODIUM SERPL-SCNC: 141 MMOL/L (ref 135–145)
UFH PPP CHRO-ACNC: 0.46 IU/ML
UFH PPP CHRO-ACNC: 0.74 IU/ML
UFH PPP CHRO-ACNC: <0.1 IU/ML
WBC # BLD AUTO: 6 10E3/UL (ref 4–11)

## 2023-10-09 PROCEDURE — 99233 SBSQ HOSP IP/OBS HIGH 50: CPT | Performed by: HOSPITALIST

## 2023-10-09 PROCEDURE — 85520 HEPARIN ASSAY: CPT | Performed by: HOSPITALIST

## 2023-10-09 PROCEDURE — 999N000040 HC STATISTIC CONSULT NO CHARGE VASC ACCESS

## 2023-10-09 PROCEDURE — 250N000013 HC RX MED GY IP 250 OP 250 PS 637: Performed by: STUDENT IN AN ORGANIZED HEALTH CARE EDUCATION/TRAINING PROGRAM

## 2023-10-09 PROCEDURE — 85027 COMPLETE CBC AUTOMATED: CPT | Performed by: STUDENT IN AN ORGANIZED HEALTH CARE EDUCATION/TRAINING PROGRAM

## 2023-10-09 PROCEDURE — 250N000011 HC RX IP 250 OP 636: Performed by: INTERNAL MEDICINE

## 2023-10-09 PROCEDURE — 36584 COMPL RPLCMT PICC RS&I: CPT

## 2023-10-09 PROCEDURE — 272N000452 HC KIT SHRLOCK 5FR POWER PICC TRIPLE LUMEN

## 2023-10-09 PROCEDURE — 999N000065 XR CHEST PORT 1 VIEW

## 2023-10-09 PROCEDURE — 250N000011 HC RX IP 250 OP 636: Mod: JZ | Performed by: STUDENT IN AN ORGANIZED HEALTH CARE EDUCATION/TRAINING PROGRAM

## 2023-10-09 PROCEDURE — 80048 BASIC METABOLIC PNL TOTAL CA: CPT | Performed by: INTERNAL MEDICINE

## 2023-10-09 PROCEDURE — 85610 PROTHROMBIN TIME: CPT | Performed by: STUDENT IN AN ORGANIZED HEALTH CARE EDUCATION/TRAINING PROGRAM

## 2023-10-09 PROCEDURE — 85520 HEPARIN ASSAY: CPT | Performed by: SURGERY

## 2023-10-09 PROCEDURE — 97535 SELF CARE MNGMENT TRAINING: CPT | Mod: GP | Performed by: PHYSICAL THERAPIST

## 2023-10-09 PROCEDURE — 97110 THERAPEUTIC EXERCISES: CPT | Mod: GP | Performed by: PHYSICAL THERAPIST

## 2023-10-09 PROCEDURE — 120N000001 HC R&B MED SURG/OB

## 2023-10-09 RX ADMIN — HEPARIN SODIUM 1550 UNITS/HR: 10000 INJECTION, SOLUTION INTRAVENOUS at 06:56

## 2023-10-09 RX ADMIN — INSULIN DEGLUDEC 15 UNITS: 100 INJECTION, SOLUTION SUBCUTANEOUS at 22:35

## 2023-10-09 RX ADMIN — METHADONE HYDROCHLORIDE 5 MG: 5 TABLET ORAL at 06:58

## 2023-10-09 RX ADMIN — MICONAZOLE NITRATE: 2 POWDER TOPICAL at 20:48

## 2023-10-09 RX ADMIN — INSULIN ASPART 1 UNITS: 100 INJECTION, SOLUTION INTRAVENOUS; SUBCUTANEOUS at 12:31

## 2023-10-09 RX ADMIN — METHADONE HYDROCHLORIDE 5 MG: 5 TABLET ORAL at 20:46

## 2023-10-09 RX ADMIN — MICONAZOLE NITRATE: 2 POWDER TOPICAL at 10:05

## 2023-10-09 RX ADMIN — ACETAMINOPHEN 975 MG: 325 TABLET, FILM COATED ORAL at 20:47

## 2023-10-09 RX ADMIN — INSULIN ASPART 1 UNITS: 100 INJECTION, SOLUTION INTRAVENOUS; SUBCUTANEOUS at 17:20

## 2023-10-09 RX ADMIN — METHADONE HYDROCHLORIDE 5 MG: 5 TABLET ORAL at 11:37

## 2023-10-09 RX ADMIN — INSULIN DEGLUDEC 15 UNITS: 100 INJECTION, SOLUTION SUBCUTANEOUS at 00:28

## 2023-10-09 RX ADMIN — ACETAMINOPHEN 975 MG: 325 TABLET, FILM COATED ORAL at 06:58

## 2023-10-09 RX ADMIN — SENNOSIDES AND DOCUSATE SODIUM 1 TABLET: 8.6; 5 TABLET ORAL at 20:46

## 2023-10-09 RX ADMIN — ATORVASTATIN CALCIUM 40 MG: 40 TABLET, FILM COATED ORAL at 20:46

## 2023-10-09 RX ADMIN — ASPIRIN 81 MG: 81 TABLET, COATED ORAL at 10:04

## 2023-10-09 RX ADMIN — MEROPENEM 500 MG: 500 INJECTION, POWDER, FOR SOLUTION INTRAVENOUS at 03:01

## 2023-10-09 RX ADMIN — SENNOSIDES AND DOCUSATE SODIUM 1 TABLET: 8.6; 5 TABLET ORAL at 10:05

## 2023-10-09 RX ADMIN — ACETAMINOPHEN 975 MG: 325 TABLET, FILM COATED ORAL at 11:37

## 2023-10-09 RX ADMIN — METHADONE HYDROCHLORIDE 10 MG: 10 TABLET ORAL at 15:55

## 2023-10-09 ASSESSMENT — ACTIVITIES OF DAILY LIVING (ADL)
ADLS_ACUITY_SCORE: 34

## 2023-10-09 NOTE — PLAN OF CARE
Goal Outcome Evaluation:      Plan of Care Reviewed With: patient    Overall Patient Progress: improvingOverall Patient Progress: improving    POD 1 2 from Left BKA. A&O X4, VSS on 3L NC. Pain managed with schedule tylenol and methadone. New 3 lumens on Right upper arm PICC in placed, infusing heparin at 1250 units/hr, recheck due at 0939 AM, 1 lumen int abx, 1 lumen SL. PIV SL. L BKA dressing CDI with wound vac in place on contentious at 125. Wound on Coccyx, left thigh, right heel and right calf, Mepilex in place. Abdominal and pelvic redness, powder applied. Tolerating Mod carb diet. Purewick used when due to void per pt requested, void adequately. Up A2, refused reposition.

## 2023-10-09 NOTE — PROGRESS NOTES
ID Chart Check    Patient is s/p left BKA for heel osteomyelitis. As planned, antibiotics were stopped 24 hours after amputation. ID will sign off. Please call with questions.     Lor Kowalski PA-C

## 2023-10-09 NOTE — PROGRESS NOTES
Discuss PICC placement with radiologist Dr. Castañeda, recommends retraction of 2cm.  Completed, new sterile dressing, repeat pcxr ordered.  Primary nursing and patient aware of plan.

## 2023-10-09 NOTE — PLAN OF CARE
Goal Outcome Evaluation:       Patient A&Ox4. VSS on RA. Purewick in place- labia redness and skin break down. Coccyx and sacrum- excoriated with scant bleeding. Skin barrier and mepilex applied. Encourage patient to self turn and reposition frequently. Wound vac in place to R BKA. Numbness present baseline. BG monitoring. Continue to monitor.

## 2023-10-09 NOTE — PROGRESS NOTES
[facility]    Medicine Progress Note - Hospitalist Service    Date of Admission:  9/29/2023    Assessment & Plan   Linda Loredo is a 78 year old female admitted on 9/29/2023. She was transferred from [facility] for L heel osteomyelitis.  She underwent BKA on 10/7/23.  Plan to return to OR mid-week.     L foot decubitus ulcer status debridement by podiatry on 9/26 with multiple organisms, L heel osteomyelitis/cellulitis  Chronic venous stasis  MRSA positive hx  S/P BKA on 10/7/23 by Dr. Alvarado  Acute blood loss anemia  Brought in 9/23 after welfare check by police found to have L leg swelling and erythema.  Hx venous stasis ulcers. Presents with L heel decubitus ulcer with osteo. Wound with multiple organisms incl pseudomonas, morganella, e faecalis, bordetella, proteus status underwent debridement 9/26 by podiatry.   Blood cultures done on 9/25/2023 have been negative.  ALMA w/ poor flow, podiatry at outside hospital requested transfer where there are vascular services.  Patient is currently on vancomycin and meropenem as per prior recommendations and status post PICC line placement on 9/27 and infectious disease, podiatry and vascular surgery team assisting.  After considering options she decided to proceed with amputation.   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   -  Remains on heparin drip, no plan to move to oral until after all surgeries completed  -  Monitor hgb, transfuse if <7     Acute on chronic pain syndrome:   - continue methadone 10 mg daily midday and 5 mg TID  - Small dose po dilaudid prn for breakthrough pain      Urine culture positive for E faecalis, Staph  "aureus and E. Coli  -UA done at outside hospital on 9/23 showed above, she completed >10 day course of antibiotics with vancomycin, ceftriaxone, meropenem on 10/8     DM II:  [PTA degludec 35 units at HS, metformin 2000 mg daily, ozempic]  A1c 7.0 9/23/23. Has been on insulin pump in the past but this was changed to tresiba 8/2023.   -hold metformin, Ozempic   - medium resistance SSI  -Continue with tresiba 15 units at bedtime     Atrial fibrillation  HTN  HLD:  [PTA- warfarin, simvastatin 20 mg daily, lisinopril 5 mg daily]  -Continue with statin, warfarin currently is on hold - Had been on heparin drip up to surgery, will defer resumption timing to vascular surgery.     CKD III:  -Baseline creatinine 1-1.2.   -Stable, monitor occasionally     Failure to thrive:  As above, called by police for welfare check. ? Safety at home. SW had seen at [facility].   -Social work team has been consulted, tentative plan is possible discharge to TCU depending on surgical recovery.     Obesity:  -  Complicates care     BERLIN:  -We will continue home cpap       Diet: Moderate Consistent Carb (60 g CHO per Meal) Diet    DVT Prophylaxis: heparin gtt  Braga Catheter: Not present  Lines: PRESENT      PICC 10/09/23 Triple Lumen Right Basilic-Site Assessment: WDL  [REMOVED] PICC 09/27/23 Single Lumen Right Basilic antibiotics-Site Assessment: WDL      Cardiac Monitoring: None  Code Status: Full Code      Clinically Significant Risk Factors               # Coagulation Defect: INR = 1.37 (Ref range: 0.85 - 1.15) and/or PTT = N/A, will monitor for bleeding    # Hypertension: Noted on problem list       # DMII: A1C = 7.0 % (Ref range: <5.7 %) within past 6 months   # Severe Obesity: Estimated body mass index is 43.8 kg/m  as calculated from the following:    Height as of this encounter: 1.676 m (5' 6\").    Weight as of this encounter: 123.1 kg (271 lb 6.2 oz).             Disposition Plan      Expected Discharge Date: 10/13/2023      Destination: " inpatient rehabilitation facility  Discharge Comments: amputation closure this week.          Jennifer Hernandez, DO  Hospitalist Service  [facility]  Securely message with [employer] (more info)  Text page via [employer] Paging/Directory   ______________________________________________________________________    Interval History   Patient seen and examined. She is doing okay. Pain is controlled. Looking forward to finishing up her surgeries this week. Updated daughter Lu via phone. In a waiting/monitoring period for now to determine if appropriate for next surgical step. No fevers. Has a little discomfort at her PICC site. Discussed with RN    Physical Exam   Vital Signs: Temp: 98  F (36.7  C) Temp src: Oral BP: 113/57 Pulse: 77   Resp: 18 SpO2: 90 % O2 Device: None (Room air) Oxygen Delivery: 3 LPM  Weight: 271 lbs 6.18 oz    Constitutional: Awake, alert, cooperative, no apparent distress  Respiratory: Clear to auscultation bilaterally, no crackles or wheezing  Cardiovascular: Regular rate and rhythm, normal S1 and S2, and no murmur noted  GI: Normal bowel sounds, soft, non-distended, non-tender  Skin/Integumen: No rashes, no cyanosis, left lower extremity with amputation--wound vac in place with serosanguinous drainage  Other:  Right upper extremity PICC, no erythema surrounding    Medical Decision Making       35 MINUTES SPENT BY ME on the date of service doing chart review, history, exam, documentation & further activities per the note.      Data     I have personally reviewed the following data over the past 24 hrs:    6.0  \   7.4 (L)   / 174     141 103 15.2 /  146 (H)   4.4 29 0.96 (H) \     INR:  1.37 (H) PTT:  N/A   D-dimer:  N/A Fibrinogen:  N/A     Imaging results reviewed over the past 24 hrs:   Recent Results (from the past 24 hour(s))   XR Chest Port 1 View    Narrative    EXAM: XR CHEST PORT 1 VIEW  LOCATION: [facility]  DATE: 10/9/2023    INDICATION:  Status post right PICC placement. Verify position.  COMPARISON: 09/27/2023      Impression    IMPRESSION: Right PICC terminates in the right atrium. Lateral left lung base is excluded from view. Imaged lung is clear. No right pleural effusion. No pneumothorax. Moderate cardiomegaly is unchanged. Pulmonary vascularity within normal limits.   XR Chest Port 1 View    Narrative    EXAM: XR CHEST PORT 1 VIEW  LOCATION: [facility]  DATE: 10/9/2023    INDICATION: RN placed PICC   verify tip placement  COMPARISON: 10/09/2023.    FINDINGS: Right PICC with tip at the junction of SVC and right atrium in good position. There is no pneumothorax. The heart is enlarged. There is no pulmonary edema. Mild bibasilar atelectasis or scarring. Degenerative disease in the spine.      Impression    IMPRESSION: Right PICC to distal SVC.

## 2023-10-09 NOTE — PROCEDURES
[facility]    Triple Lumen PICC Placement    Date/Time: 10/9/2023 1:46 AM    Performed by: Maira Arrieta RN  Authorized by: Clif Messer MD  Indications: vascular access      UNIVERSAL PROTOCOL   Site Marked: Yes  Prior Images Obtained and Reviewed:  Yes  Required items: Required blood products, implants, devices and special equipment available    Patient identity confirmed:  Verbally with patient, arm band, provided demographic data and hospital-assigned identification number  NA - No sedation, light sedation, or local anesthesia  Confirmation Checklist:  Patient's identity using two indicators, relevant allergies, procedure was appropriate and matched the consent or emergent situation and correct equipment/implants were available  Time out: Immediately prior to the procedure a time out was called    Universal Protocol: the Joint Commission Universal Protocol was followed    Preparation: Patient was prepped and draped in usual sterile fashion      SEDATION    Patient Sedated: No        Preparation: skin prepped with 2% chlorhexidine  Skin prep agent: skin prep agent completely dried prior to procedure  Sterile barriers: maximum sterile barriers were used: cap, mask, sterile gown, sterile gloves, and large sterile sheet  Hand hygiene: hand hygiene performed prior to central venous catheter insertion  Type of line used: PICC  Catheter type: triple lumen  Lumen type: valved and power PICC  Lumen Identification: Red, White and Gray  Catheter size: 5 Fr  Brand: Bard  Lot number: [id number]  Placement method: MST, ultrasound and tip navigation system  Number of attempts: 1  Difficulty threading catheter: no  Successful placement: yes  Orientation: right    Location: basilic vein  Tip Location: SVC  Arm circumference: adults 10 cm  Extremity circumference: 33  Visible catheter length: 2  Total catheter length: 50  Dressing and securement: chlorhexidine disc applied, gloves changed prior to  final dressing, subcutaneous anchor securement system, transparent dressing, sterile dressing applied and alcohol impregnated caps  Post procedure assessment: blood return through all ports, free fluid flow and placement verified by x-ray  PROCEDURE   Patient Tolerance:  Patient tolerated the procedure well with no immediate complicationsDescribe Procedure: Over-the-wire triple lumen PICC exchange completed utilizing current single lumen PICC, patient's right basilic vein.  Poor imagery on ultrasound necessitated xray confirmation.  Disposal: sharps and needle count correct at the end of procedure, needles and guidewire disposed in sharps container

## 2023-10-09 NOTE — PROGRESS NOTES
"SPIRITUAL HEALTH SERVICES - Progress Note   SH Gen Surg  Saw pt Linda ELIE Loredo per follow up from previous visits.     Patient/Family Understanding of Illness and Goals of Care - Pat explained that she \"had surgery a few days ago\" and that she is \"going back in within the next few days for them to finish up.\" She also said they \"have been talking a lot about a prosthesis and I am not interested in that.\"     Distress and Loss -   Pat said she \"had a lot of fear\" previously but is \"now ready to move on and look forward.\"    Strengths, Coping, and Resources -   Pat continues to name her daughter as her primary source of \"strength\" and the person she engages \"in the important conversations with.\"   Pat also recalled personal situations that have caused her grief in the past but also taught her so many ways to \"cope.\" She expressed gratitude for those situations even though they were \"hard at the time.\"    Meaning, Beliefs, and Spirituality - Pat continues to \"trust in God\" and appreciate the support of a prayer group. She asked for prayer, which I provided.     Plan of Care - I will continue to check in with Margaret while she is on the floor. Spiritual Health remains available upon request as well.       Loretta Cat  Chaplain Resident    [facility] routine referrals *[phone]    [facility] available 24/7 for emergent requests/referrals, either by paging the on-call  or by entering an ASAP/STAT consult in Epic (this will also page the on-call ).   "

## 2023-10-09 NOTE — PROGRESS NOTES
"Assess regarding now    No new issues overnight.  Pain appears to be well controlled.  Vac working well.    /57   Pulse 77   Temp 98  F (36.7  C) (Oral)   Resp 18   Ht 1.676 m (5' 6\")   Wt 123.1 kg (271 lb 6.2 oz)   SpO2 90%   BMI 43.80 kg/m      Resting in bed, awake, alert, appropriate  Wound VAC to the left below-knee amputation stump, 400 mL total in vac cannister, serosanguineous. 100 ml in last 24    Hgb stable    Assessment and plan:  Postop day 2 status post left guillotine BKA  Will change vac tomorrow and assess wound for timing of completion left below-knee amputation.  Continue heparin drip bridge for atrial fibrillation.  Antibiotics stopped as per infectious disease.    D/w Dr. Clarita Alvarado MD        "

## 2023-10-10 LAB
ANION GAP SERPL CALCULATED.3IONS-SCNC: 8 MMOL/L (ref 7–15)
BLD PROD TYP BPU: NORMAL
BLOOD COMPONENT TYPE: NORMAL
BUN SERPL-MCNC: 15.3 MG/DL (ref 8–23)
CALCIUM SERPL-MCNC: 10.1 MG/DL (ref 8.8–10.2)
CHLORIDE SERPL-SCNC: 103 MMOL/L (ref 98–107)
CODING SYSTEM: NORMAL
CREAT SERPL-MCNC: 0.92 MG/DL (ref 0.51–0.95)
CROSSMATCH: NORMAL
DEPRECATED HCO3 PLAS-SCNC: 28 MMOL/L (ref 22–29)
EGFRCR SERPLBLD CKD-EPI 2021: 63 ML/MIN/1.73M2
ERYTHROCYTE [DISTWIDTH] IN BLOOD BY AUTOMATED COUNT: 13.8 % (ref 10–15)
GLUCOSE BLDC GLUCOMTR-MCNC: 119 MG/DL (ref 70–99)
GLUCOSE BLDC GLUCOMTR-MCNC: 145 MG/DL (ref 70–99)
GLUCOSE BLDC GLUCOMTR-MCNC: 157 MG/DL (ref 70–99)
GLUCOSE BLDC GLUCOMTR-MCNC: 174 MG/DL (ref 70–99)
GLUCOSE SERPL-MCNC: 121 MG/DL (ref 70–99)
HCT VFR BLD AUTO: 21.5 % (ref 35–47)
HGB BLD-MCNC: 6.6 G/DL (ref 11.7–15.7)
HGB BLD-MCNC: 8.1 G/DL (ref 11.7–15.7)
INR PPP: 1.22 (ref 0.85–1.15)
ISSUE DATE AND TIME: NORMAL
MAGNESIUM SERPL-MCNC: 2 MG/DL (ref 1.7–2.3)
MCH RBC QN AUTO: 29.9 PG (ref 26.5–33)
MCHC RBC AUTO-ENTMCNC: 30.7 G/DL (ref 31.5–36.5)
MCV RBC AUTO: 97 FL (ref 78–100)
PATH REPORT.COMMENTS IMP SPEC: NORMAL
PATH REPORT.COMMENTS IMP SPEC: NORMAL
PATH REPORT.FINAL DX SPEC: NORMAL
PATH REPORT.GROSS SPEC: NORMAL
PATH REPORT.MICROSCOPIC SPEC OTHER STN: NORMAL
PATH REPORT.RELEVANT HX SPEC: NORMAL
PHOSPHATE SERPL-MCNC: 2.9 MG/DL (ref 2.5–4.5)
PHOTO IMAGE: NORMAL
PLATELET # BLD AUTO: 201 10E3/UL (ref 150–450)
POTASSIUM SERPL-SCNC: 4.5 MMOL/L (ref 3.4–5.3)
RBC # BLD AUTO: 2.21 10E6/UL (ref 3.8–5.2)
SODIUM SERPL-SCNC: 139 MMOL/L (ref 135–145)
UFH PPP CHRO-ACNC: 0.39 IU/ML
UFH PPP CHRO-ACNC: 0.41 IU/ML
UNIT ABO/RH: NORMAL
UNIT NUMBER: NORMAL
UNIT STATUS: NORMAL
UNIT TYPE ISBT: 7300
WBC # BLD AUTO: 6.2 10E3/UL (ref 4–11)

## 2023-10-10 PROCEDURE — 99232 SBSQ HOSP IP/OBS MODERATE 35: CPT | Performed by: HOSPITALIST

## 2023-10-10 PROCEDURE — 120N000001 HC R&B MED SURG/OB

## 2023-10-10 PROCEDURE — 250N000011 HC RX IP 250 OP 636: Mod: JZ | Performed by: STUDENT IN AN ORGANIZED HEALTH CARE EDUCATION/TRAINING PROGRAM

## 2023-10-10 PROCEDURE — 97606 NEG PRS WND THER DME>50 SQCM: CPT

## 2023-10-10 PROCEDURE — 85027 COMPLETE CBC AUTOMATED: CPT | Performed by: HOSPITALIST

## 2023-10-10 PROCEDURE — 83735 ASSAY OF MAGNESIUM: CPT | Performed by: HOSPITALIST

## 2023-10-10 PROCEDURE — 250N000013 HC RX MED GY IP 250 OP 250 PS 637: Performed by: STUDENT IN AN ORGANIZED HEALTH CARE EDUCATION/TRAINING PROGRAM

## 2023-10-10 PROCEDURE — 250N000012 HC RX MED GY IP 250 OP 636 PS 637: Performed by: STUDENT IN AN ORGANIZED HEALTH CARE EDUCATION/TRAINING PROGRAM

## 2023-10-10 PROCEDURE — 88311 DECALCIFY TISSUE: CPT | Mod: 26 | Performed by: PATHOLOGY

## 2023-10-10 PROCEDURE — 85520 HEPARIN ASSAY: CPT | Performed by: SURGERY

## 2023-10-10 PROCEDURE — P9016 RBC LEUKOCYTES REDUCED: HCPCS | Performed by: INTERNAL MEDICINE

## 2023-10-10 PROCEDURE — 82310 ASSAY OF CALCIUM: CPT | Performed by: HOSPITALIST

## 2023-10-10 PROCEDURE — 84100 ASSAY OF PHOSPHORUS: CPT | Performed by: HOSPITALIST

## 2023-10-10 PROCEDURE — 85018 HEMOGLOBIN: CPT | Performed by: HOSPITALIST

## 2023-10-10 PROCEDURE — 88307 TISSUE EXAM BY PATHOLOGIST: CPT | Mod: 26 | Performed by: PATHOLOGY

## 2023-10-10 PROCEDURE — 85520 HEPARIN ASSAY: CPT | Performed by: HOSPITALIST

## 2023-10-10 PROCEDURE — 85610 PROTHROMBIN TIME: CPT | Performed by: STUDENT IN AN ORGANIZED HEALTH CARE EDUCATION/TRAINING PROGRAM

## 2023-10-10 RX ORDER — POLYETHYLENE GLYCOL 3350 17 G/17G
17 POWDER, FOR SOLUTION ORAL DAILY PRN
Status: DISCONTINUED | OUTPATIENT
Start: 2023-10-10 | End: 2023-10-12

## 2023-10-10 RX ORDER — HYDROMORPHONE HYDROCHLORIDE 1 MG/ML
0.3 INJECTION, SOLUTION INTRAMUSCULAR; INTRAVENOUS; SUBCUTANEOUS
Status: DISCONTINUED | OUTPATIENT
Start: 2023-10-10 | End: 2023-10-24 | Stop reason: HOSPADM

## 2023-10-10 RX ORDER — HYDROMORPHONE HYDROCHLORIDE 1 MG/ML
0.5 INJECTION, SOLUTION INTRAMUSCULAR; INTRAVENOUS; SUBCUTANEOUS
Status: DISCONTINUED | OUTPATIENT
Start: 2023-10-10 | End: 2023-10-24 | Stop reason: HOSPADM

## 2023-10-10 RX ORDER — HYDROMORPHONE HYDROCHLORIDE 2 MG/1
4 TABLET ORAL EVERY 4 HOURS PRN
Status: DISCONTINUED | OUTPATIENT
Start: 2023-10-10 | End: 2023-10-24 | Stop reason: HOSPADM

## 2023-10-10 RX ADMIN — ASPIRIN 81 MG: 81 TABLET, COATED ORAL at 08:50

## 2023-10-10 RX ADMIN — METHADONE HYDROCHLORIDE 10 MG: 10 TABLET ORAL at 15:09

## 2023-10-10 RX ADMIN — MICONAZOLE NITRATE: 2 POWDER TOPICAL at 08:51

## 2023-10-10 RX ADMIN — METHADONE HYDROCHLORIDE 5 MG: 5 TABLET ORAL at 06:00

## 2023-10-10 RX ADMIN — HYDROMORPHONE HYDROCHLORIDE 2 MG: 2 TABLET ORAL at 08:50

## 2023-10-10 RX ADMIN — INSULIN DEGLUDEC 15 UNITS: 100 INJECTION, SOLUTION SUBCUTANEOUS at 23:23

## 2023-10-10 RX ADMIN — HYDROMORPHONE HYDROCHLORIDE 0.4 MG: 0.2 INJECTION, SOLUTION INTRAMUSCULAR; INTRAVENOUS; SUBCUTANEOUS at 09:47

## 2023-10-10 RX ADMIN — HEPARIN SODIUM 1250 UNITS/HR: 10000 INJECTION, SOLUTION INTRAVENOUS at 21:25

## 2023-10-10 RX ADMIN — MICONAZOLE NITRATE: 2 POWDER TOPICAL at 20:59

## 2023-10-10 RX ADMIN — HEPARIN SODIUM 1250 UNITS/HR: 10000 INJECTION, SOLUTION INTRAVENOUS at 02:52

## 2023-10-10 RX ADMIN — METHADONE HYDROCHLORIDE 5 MG: 5 TABLET ORAL at 12:15

## 2023-10-10 RX ADMIN — METHADONE HYDROCHLORIDE 5 MG: 5 TABLET ORAL at 20:56

## 2023-10-10 RX ADMIN — SENNOSIDES AND DOCUSATE SODIUM 1 TABLET: 8.6; 5 TABLET ORAL at 20:56

## 2023-10-10 RX ADMIN — SENNOSIDES AND DOCUSATE SODIUM 1 TABLET: 8.6; 5 TABLET ORAL at 08:50

## 2023-10-10 RX ADMIN — INSULIN ASPART 1 UNITS: 100 INJECTION, SOLUTION INTRAVENOUS; SUBCUTANEOUS at 18:35

## 2023-10-10 RX ADMIN — ATORVASTATIN CALCIUM 40 MG: 40 TABLET, FILM COATED ORAL at 20:52

## 2023-10-10 RX ADMIN — ACETAMINOPHEN 975 MG: 325 TABLET, FILM COATED ORAL at 04:10

## 2023-10-10 ASSESSMENT — ACTIVITIES OF DAILY LIVING (ADL)
ADLS_ACUITY_SCORE: 34
ADLS_ACUITY_SCORE: 34
ADLS_ACUITY_SCORE: 30
ADLS_ACUITY_SCORE: 34

## 2023-10-10 NOTE — PROGRESS NOTES
Care Management Follow Up    Length of Stay (days): 11    Expected Discharge Date: 10/13/2023     Concerns to be Addressed: discharge planning     Patient plan of care discussed at interdisciplinary rounds: Yes    Anticipated Discharge Disposition: Transitional Care    Patient/family educated on Medicare website which has current facility and service quality ratings:  yes  Education Provided on the Discharge Plan: yes  Patient/Family in Agreement with the Plan: yes    Referrals Placed by CM/SW:  TCU locations  Private pay costs discussed: Not applicable    Additional Information:  YUDY received call from Trini in admissions at [place] ([phone]) saying they can accept the patient on Friday at [facility]. It Is a private room with no extra cost. Transport will need to be scheduled.     ADD: [id number]  Updated patient on the bed availability. She said this was not a preferred choice, it is too far from [place], so she has to think about whether she wants to go there or not. SW reminded her that she does not have any other accepting bed for TCU.     Irlanda James, MSW, LGSW   Social Work   [facility]

## 2023-10-10 NOTE — PLAN OF CARE
Goal Outcome Evaluation:      Plan of Care Reviewed With: patient    Overall Patient Progress: no changeOverall Patient Progress: no change    Date & Time: 10/9 11p - 10/10 7a  Surgery/POD#: POD 3 from a L BKA.  Behavior & Aggression: Green  Fall Risk: Yes  Orientation: A&O x4  ABNL VS/O2: VSS on RA  Tele: NA  ABNL Labs: Hgb 6.6 this AM, 1 unit(s) RBCs ordered by provider  Pain Management: scheduled meds  Bowel/Bladder: purewick used when due to void d/t redness/breakdown, last BM 10/6 per chart  Drains: WV to L stump @ 125 continuous  Lines: triple lumen PICC infusing hep @ 1,250 unit(s)/hr   Skin: perineum/sacrum skin breakdown d/t moisture, see LDAs  Diet: mod carb  Activity Level: x2, T/R  Tests/Procedures: NA  Anticipated  DC Date: pending  Significant Information: WV change today, will return to OR later this week   X2 therapeutic anti-xa levels, recheck next AM.

## 2023-10-10 NOTE — PROGRESS NOTES
"VASCULAR SURGERY PROGRESS NOTE    Subjective:  No new issues overnight.  Pain appears to be well controlled.  Vac working well.        Objective:  Intake/Output Summary (Last 24 hours) at 10/10/2023 1103  Last data filed at 10/10/2023 0830  Gross per 24 hour   Intake 930 ml   Output 1050 ml   Net -120 ml     PHYSICAL EXAM:  /61 (BP Location: Left arm)   Pulse 87   Temp 97.7  F (36.5  C) (Oral)   Resp 16   Ht 5' 6\" (1.676 m)   Wt 253 lb 12 oz (115.1 kg)   SpO2 92%   BMI 40.96 kg/m    Resting in bed, awake, alert, appropriate  Wound VAC to the left below-knee amputation stump  HGB stable      ASSESSMENT:  Postop day 3 status post left guillotine BKA         PLAN:  -OR on Friday for fascia closure over stump  -continue heparin drip bridge for atrial fibrillation  -appreciate all teams involved    Discussed pt history, exam, assessment and plan with Dr. Otto of the vascular surgery service, who is in agreement with the above.    Vanita Carter NP  VASCULAR SURGERY                  "

## 2023-10-10 NOTE — PLAN OF CARE
Goal Outcome Evaluation:                  Date & Time: 10/10/23. [id number]  Surgery/POD#: POD 3 from a L BKA.  Behavior & Aggression: Green  Fall Risk: Yes  Orientation: patient is alert and oriented  x4  ABNL VS/O2: VSS on RA with good sat of 96%  Tele: NA  ABNL Labs: Hgb 6.6, patient transfused and  Blood transfusion completed @1550, no adverse reaction noted or reported.Hgb recheck @1700 with value of 8.1. BS monitored and sliding insulin was given per order parameter.  Pain Management:  per patient use schedule methadone.  Bowel/Bladder: purewick used when due to void d/t redness/breakdown, last BM 10/6 per chart  Drains: Wound vac to L stump @ 125 continuous  Lines: Triple lumen PICC with good blood return. IV hep infusing 1,250 unit(s)/hr.  Skin: perineum/sacrum skin breakdown d/t moisture, see LDAs  Diet: mod carb with good appetite.   Activity Level: Up with assist of 2 with lift  Tests/Procedures: NA  Anticipated  DC Date: pending  Significant Information: wound vac  change today, will return to OR on Friday. Contact precaution for MRSA maintained.    X2 therapeutic Anti-xa levels, with recheck tomorrow morning.

## 2023-10-10 NOTE — PROGRESS NOTES
[facility]    Medicine Progress Note - Hospitalist Service    Date of Admission:  9/29/2023    Assessment & Plan   Linda Loredo is a 78 year old female admitted on 9/29/2023. She was transferred from [facility] for L heel osteomyelitis.  She underwent BKA on 10/7/23.  Plan to return to OR mid-week.     L foot decubitus ulcer status debridement by podiatry on 9/26 with multiple organisms, L heel osteomyelitis/cellulitis  Chronic venous stasis  MRSA positive hx  S/P BKA on 10/7/23 by Dr. Alvarado  Acute blood loss anemia  Brought in 9/23 after welfare check by police found to have L leg swelling and erythema.  Hx venous stasis ulcers. Presents with L heel decubitus ulcer with osteo. Wound with multiple organisms incl pseudomonas, morganella, e faecalis, bordetella, proteus status underwent debridement 9/26 by podiatry.   Blood cultures done on 9/25/2023 have been negative.  ALMA w/ poor flow, podiatry at outside hospital requested transfer where there are vascular services.  Patient is currently on vancomycin and meropenem as per prior recommendations and status post PICC line placement on 9/27 and infectious disease, podiatry and vascular surgery team assisting.  After considering options she decided to proceed with amputation.   * 10/7/23 Left guillotine below knee amputation  *10/10 1u PRBCs given for hgb 6.6  -  Vac changed 10/10 but very painful--increased PRN pain medications to prepare for next vac change (if needed)  - possible completion of left BKA 10/13  - Remained on IV meropenem and IV vanco until 24 hours post-amputation per ID recommendation (last doses 10/8 evening)  -  Post-op site cares, anticoagulation, activity restrictions, pain meds per surgical team.   -  Remains on heparin drip, no plan to move to oral until after all surgeries completed  -  Monitor hgb, transfuse if <7     Acute on chronic pain syndrome:   - continue methadone 10 mg daily midday and 5 mg  "TID  - IV and PO dilaudid prn for breakthrough pain (doses increased 10/10)     Urine culture positive for E faecalis, Staph aureus and E. Coli  -UA done at outside hospital on 9/23 showed above, she completed >10 day course of antibiotics with vancomycin, ceftriaxone, meropenem on 10/8     DM II:  [PTA degludec 35 units at HS, metformin 2000 mg daily, ozempic]  A1c 7.0 9/23/23. Has been on insulin pump in the past but this was changed to tresiba 8/2023.   - hold metformin, Ozempic   - medium resistance SSI  - Continue with tresiba 15 units at bedtime     Atrial fibrillation  HTN  HLD:  [PTA- warfarin, simvastatin 20 mg daily, lisinopril 5 mg daily]  -Continue with statin, warfarin currently is on hold - Had been on heparin drip up to surgery, will defer resumption timing to vascular surgery.     CKD III:  -Baseline creatinine 1-1.2.   -Stable, monitor occasionally     Failure to thrive:  As above, called by police for welfare check. ? Safety at home. SW had seen at [facility].   -Social work team has been consulted, tentative plan is possible discharge to TCU depending on surgical recovery.     Obesity:  - Complicates care     BERLIN:  -continue home cpap       Diet: Moderate Consistent Carb (60 g CHO per Meal) Diet    DVT Prophylaxis: heparin gtt  Braga Catheter: Not present  Lines: PRESENT      PICC 10/09/23 Triple Lumen Right Basilic-Site Assessment: WDL      Cardiac Monitoring: None  Code Status: Full Code      Clinically Significant Risk Factors               # Coagulation Defect: INR = 1.22 (Ref range: 0.85 - 1.15) and/or PTT = N/A, will monitor for bleeding      # Hypertension: Noted on problem list         # DMII: A1C = 7.0 % (Ref range: <5.7 %) within past 6 months     # Severe Obesity: Estimated body mass index is 40.96 kg/m  as calculated from the following:    Height as of this encounter: 1.676 m (5' 6\").    Weight as of this encounter: 115.1 kg (253 lb 12 oz).               Disposition Plan      Expected " Discharge Date: 10/13/2023      Destination: inpatient rehabilitation facility  Discharge Comments: amputation closure this week.          Jennifer Hernandez DO  Hospitalist Service  [facility]  Securely message with [employer] (more info)  Text page via [employer] Paging/Directory   ______________________________________________________________________    Interval History   Patient seen and examined. Had a rough morning. [place] like there wasn't an obvious plan in place in the morning and her wound vac change was much more painful than she expected. We spent about 25 minutes just talking about her morning, about ways that we could make the next vac change easier and providing supportive listening for her concerns. Plan for transfusion today for hgb of 6.6. did have issue with bleeding during vac change. Other than that, she feels okay. Denies fevers, chills. Tolerating diet.    Physical Exam   Vital Signs: Temp: 98.1  F (36.7  C) Temp src: Oral BP: 114/59 Pulse: 80   Resp: 16 SpO2: 96 % O2 Device: None (Room air) Oxygen Delivery: 2 LPM  Weight: 253 lbs 11.99 oz    Constitutional: Awake, alert, cooperative, no apparent distress  Respiratory: Clear to auscultation bilaterally, no crackles or wheezing  Cardiovascular: Regular rate and rhythm, normal S1 and S2, and no murmur noted  GI: Normal bowel sounds, soft, non-distended, non-tender  Skin/Integumen: No rashes, no cyanosis, left lower extremity with amputation--wound vac in place with sanguinous drainage in tubing  Other:  Right upper extremity PICC, no erythema surrounding    Medical Decision Making       35 MINUTES SPENT BY ME on the date of service doing chart review, history, exam, documentation & further activities per the note.      Data     I have personally reviewed the following data over the past 24 hrs:    6.2  \   6.6 (LL)   / 201     139 103 15.3 /  119 (H)   4.5 28 0.92 \     INR:  1.22 (H) PTT:  N/A   D-dimer:  N/A Fibrinogen:  N/A      Imaging results reviewed over the past 24 hrs:   No results found for this or any previous visit (from the past 24 hour(s)).

## 2023-10-10 NOTE — PROGRESS NOTES
Care Management Follow Up    Length of Stay (days): 11    Expected Discharge Date: 10/13/2023     Concerns to be Addressed: adjustment to diagnosis/illness, coping/stress, discharge planning     Patient plan of care discussed at interdisciplinary rounds: Yes    Anticipated Discharge Disposition: Transitional Care     Anticipated Discharge Services:    Anticipated Discharge DME:      Patient/family educated on Medicare website which has current facility and service quality ratings:    Education Provided on the Discharge Plan: Yes  Patient/Family in Agreement with the Plan: yes    Referrals Placed by CM/SW:    Private pay costs discussed: Not applicable    Additional Information:  Writer looked over TCU referrals. A few denials, added another referral and talked to [facility] TCU liaison for [place] regarding pending referrals.     LIZY Sol

## 2023-10-10 NOTE — PROGRESS NOTES
"[facility] Nurse Inpatient Assessment     Consulted for: 9/30 Wound diabetic foot ulcer left foot - wound vac placement. 10/2 Wound right buttocks.   10/6: Per chart review, patient is now agreeable to left BKA. Vascular surgery has been re-consulted.     Summary:   1) Left heel ulcer: Infected with osteomyelitis, recent debridement by podiatry 9/26, topical dressing applied 10/4. S/p 10/7,Guillotine amp, plan for surgery on 10/13  2) BL legs: Chronic venous stasis with superficial healing wounds, not assessed 10/4, assessed weekly.   3) Buttock/Coccyx: Mixture of friction and moisture, not assessed 10/4, assessed weekly.   4) Left IT: Unstageable ulcer present on admission, not assessed 10/4, assessed weekly.     10/10: Not able to evaluate coccyx, right LE or L IT wounds due to request per Vascular team to change NPWT to Left BKA and patient was having \"too much pain\" once dressing hcange was complete. Will return 10/11 to assess these areas.    Patient History (according to provider note(s):      \"78 year old female admitted on 9/29/2023. She presents as a transfer from [facility] for L heel osteomyelitis \"    Assessment:      Areas visualized during today's visit: Heels , Sacrum/coccyx, and Lower extremities left     Wound location: Left heel  Last photo: 10/6/23             10/10  Wound due to: Pressure Injury and Diabetic Ulcer, now surgical wound/amp  Wound history/plan of care: s/p BKA, saritha 10/7  Wound base: 30% bone, 20 % granulation tissue 50% muscle, adipose     Palpation of the wound bed: firm and fluctuance-     Drainage: moderate     Description of drainage: serosanguinous and bloody     Measurements (length x width x depth, in cm): 18  x 16  x 2cm to center      Tunneling: N/A     Undermining: N/A  Periwound skin: Dry/scaly, Edematous, and Macerated, maroon discoloration      Color:  scattered micro bleeding under tissues, steady trickle at 11 o'clock, pressure " "held, bleeding minimized to droplets.  -patient on heparin.        Temperature: normal   Odor: none  Pain: severe, score \"20/20\", Irritable or crying out at intervals, tension to hands, feet and body, facial expression of distress, and during dressing change, intermittent, stabbing, and sharp  Pain interventions prior to dressing change: oral dilaudid, IV dilaudid, slow and gentle cares , and distraction  Treatment goal: Drainage control, Infection control/prevention, Maintain (prevention of deterioration), Protection, and Prepare wound bed for flap/graft  STATUS: stable  Supplies ordered: at bedside and discussed with patient     Negative pressure wound therapy applied to: Left BKA   Last photo: 10/10, see above   Wound due to: Surgical Wound   Wound history/plan of care:    Surgical date: 10/7/23   Service following: Vascular  Date Negative Pressure Wound Therapy initiated: 10/7/23   Interventions in place: repositioning, pressure redistribution with device or dressing, specialty surface in use, moisture/incontinence management, offloading, and elevation  Is patient s nutritional status compromised? no   If yes, what interventions are in place? N/A  Reason for initiating vac therapy? Presence of co-morbidities and Need for accelerated granulation tissue  Which?of?the?following?co-morbidities?apply? Diabetes, Immobility, Obesity, and Depression  If diabetic is patient on a diabetic management program? Yes   Is osteomyelitis present in wound? no   If yes what treatments are in place? N/A       Volume in cannister: 400ml     Last cannister change date: 10/7     Description of drainage: serosanguinous and bloody        Supplies ordered: gathered and discussed with patient , medium black granufoam, cavillon skin prep    Number of foam pieces removed from a wound (excluding foam for bridge) :  1 GranuFoam Black   Verified this matched the number of foam pieces applied last dressing change: Yes   Number of foam pieces " packed into wound (excluding foam for bridge) :  1 GranuFoam Black      Wound location: Right posterior   Last photo: 10/6/23      Wound due to: Venous Ulcer  Wound history/plan of care: wound found NANCY on assessment  Chronic non healing ulcer, chronic edema with diabetes found covered with a mepilex  Wound base: red moist tissue with small scattered areas of yellow, adherent fibrinous tissue      Palpation of the wound bed: normal      Drainage: scant     Description of drainage: serosanguinous     Measurements (length x width x depth, in cm): 1.5  x 2  x  0.1 cm      Tunneling: N/A     Undermining: N/A  Periwound skin: Intact, Edematous, and Erythema- blanchable      Color: pink      Temperature: normal   Odor: none  Pain: denies , none  Pain interventions prior to dressing change: N/A  Treatment goal: Heal  and Infection control/prevention  STATUS: improving  Supplies ordered: at bedside, supplies stored on unit, and discussed with RN    Wound location: Left lateral LE  Last photo: 10/6/23        Wound due to: Venous Ulcer  Wound history/plan of care: Chronic non healing ulcer, chronic edema with diabetes found covered with a mepilex  Wound base: pink, shiny epidermis     Palpation of the wound bed: normal      Drainage: scant     Description of drainage: serosanguinous     Measurements (length x width x depth, in cm): 2  x 0.8  x  0.1 cm (not measured 10/6)     Tunneling: N/A     Undermining: N/A  Periwound skin: Dry/scaly, Edematous, and residual Triad paste      Color: normal and consistent with surrounding tissue      Temperature: normal   Odor: none  Pain: denies , none  Pain interventions prior to dressing change: N/A  Treatment goal: Heal   STATUS: resurfaced  Supplies ordered: at bedside    Wound location: Coccyx/buttock  Last photo: 10/2/23        Wound due to: Friction and Incontinence Associated Dermatitis (IAD)  Wound history/plan of care: Patient with wounds on admission. Pt reports that her buttocks  and sacrum are painful. Found with Triad paste on buttocks.   Wound base: Moist red tissue, evidence of chronic tissue injury to bilateral buttocks and ITs     Palpation of the wound bed: normal      Drainage: moderate,  serosanguinous and bloody     Description of drainage: serosanguinous and bloody     Measurements (length x width x depth, in cm):  10 x 6 x 0.1 cm (total area spanning buttocks)     Tunneling: N/A     Undermining: N/A  Periwound skin: Dry/scaly and Erythema- blanchable      Color: dusky and purple      Temperature: normal   Odor: none  Pain: mild, tender  Pain interventions prior to dressing change: slow and gentle cares   Treatment goal: Heal , Drainage control, Decrease moisture, and Protection  STATUS: unchanged  Supplies ordered: at bedside, supplies stored on unit, discussed with RN, and discussed with patient     Wound location: Right IT        Last photo: 10/6/23  Wound due to: Mixed Etiology: moisture, friction, pressure  Wound history/plan of care: Patient sitting at >45 degree angle on assessment. Wound covered with Triad. Patient has three incontinence pads under her as well as Purewick in place.    Wound base: Yellow /pink marbled tissue, yellow tissue is adherent, fibrinous,     Palpation of the wound bed: normal      Drainage: scant     Description of drainage: serosanguinous     Measurements (length x width x depth, in cm): 1.2  x 2  x  0.2 cm      Tunneling: N/A     Undermining: N/A  Periwound skin: Dry/scaly and Erythema- blanchable      Color: dusky and pink      Temperature: normal   Odor: none  Pain: mild, stinging  Pain interventions prior to dressing change: soaking and slow and gentle cares   Treatment goal: Heal , Drainage control, Decrease moisture, and Protection  STATUS: initial assessment  Supplies ordered: at bedside, supplies stored on unit, and discussed with RN        Treatment Plan:       Wound care  EVERY OTHER DAY      Comments: BL lower legs: Every other day and  "prn  1.Cleanse the area with Marisol cleanse and protect and dinah dry wipes/soft cloth.  2. Apply nickel thick layer of Triad paste (#965701) to wound bed (right posterior calf)   3. Cover with  Border Dressing (#006581)          Wound care  Daily and PRN with incontinence        Comments: Coccyx, buttock and Right IT wound(s): BID and PRN incontinence  1.Cleanse the area with Marisol cleanse and protect and dinha dry wipes/soft cloth. Ensure skin is dry.  2. Swab sreekanth-wound skin with no-sting barrier and allow to dry completely.  3. Use a sacral Mepilex to cover open, bleeding areas. Works best to apply dressing \"upside down\".  *Leave the brief off in bed to let the area dry as much as possible.  *Use only one Covidien pad in between mattress and pt. No brief while in bed.  *Follow PIP Plan            Wound care  EVERY MWF      Comments: Location: left heel  Care: provided by primary RN M/W/F  1. Cleanse wound with commercial wound cleanser Micorklenz (avoid vashe due to not compatible with hydrofera) soaked gauze  2. Moisten cut to fit Hydrofera Blue (#900953) with Normal saline, wring out excess so it is damp and gently pack into wound (this will remain in wound as primary dressing)  3. Apply Cavilon No Sting Skin Prep (#800371) to periwound and allow to dry.  4.  Cover wound with gauze sponge or ABD. Secure with Kerlix and tape  5. Time and date dressing change  *Float heels at all times with pillows or use Prevalon boots if not maintaining positioning.        Negative pressure wound therapy plan:  Wound location: Left BKA   Change Days: Tues/ Fri by [facility] RN    Supplies (including all accessories) used: medium Black foam , Adaptic/Curity oil emulsion contact layer , and Cavilon no sting barrier film  Cleanse with Normal saline and MicroKlenz prior to replacing VAC    Suction setting: -125   Methods used: Window paned all periwound skin with vac drape prior to applying sponge    Staff RN to assess integrity of " "dressing and ensure suction is set at appropriate level every shift.   Date canister. Chart canister output every shift. Change cannister weekly and PRN if full/occluded     Remove foam dressing and replace with BID normal saline moist gauze dressing if:   -a dressing failure which cannot be repaired within 2 hours   -patient is discharging to home without a home pump   -patient is discharging to a facility outside the local area   -if a dressing is a \"Silver Foam\", remove before Radiation Therapy or MRI     The hospital VAC pump is not to be discharged with the patient.?Ensure to disconnect patient from machine prior to discharge. Then,    - If a home KCI VAC pump has been delivered, connect home cannister to dressing tubing then connect cannister to home pump and turn on machine    - If transferring to a nearby facility with a KCI vac, can disconnect and clamp tubing then cover end with glove so can be reconnected within 2 hours       Pressure Injury Prevention (PIP) Plan:  -If patient is declining pressure injury prevention interventions: Explore reason why and address patient's concerns, Educate on pressure injury risk and prevention intervention(s), If patient is still declining, document \"informed refusal\" , and Ensure Care team is aware ( provider, charge nurse, etc)  -Mattress: Follow bed algorithm, reassess daily and order specialty mattress, if indicated.  -HOB: Maintain at or below 30 degrees, unless contraindicated  -Repositioning in bed: Every 1-2 hours , Left/right positioning; avoid supine, and Raise foot of bed prior to raising head of bed, to reduce patient sliding down (shear)  -Heels: Keep elevated off mattress  -Protective Dressing:  Mepilex to R heel  -Positioning Equipment:  pillows  -Chair positioning: Chair cushion (#[id number]) , Repositions self: patient to shift weight every 15 minutes, and Do NOT use a donut for sitting (this increases pressure to smaller area and creates a higher potential " for injury)    -Moisture Management: Perineal cleansing /protection: Follow Incontinence Protocol, Avoid brief in bed, Clean and dry skin folds with bathing , and Moisturize dry skin  -Under Devices: Inspect skin under all medical devices during skin inspection , Ensure tubes are stabilized without tension, and Ensure patient is not lying on medical devices or equipment when repositioned     Orders: Reviewed, Written, and Updated    RECOMMEND PRIMARY TEAM ORDER: None, at this time  Education provided: plan of care, wound progress, Moisture management, Hygiene, and Off-loading pressure  Discussed plan of care with: Patient and Nurse   [facility] nurse follow-up plan: Tuesday/Friday for NPWT dressing changes and wound follow up  Notify [facility] if wound(s) deteriorate.  Nursing to notify the Provider(s) and re-consult the [facility] Nurse if new skin concern.    DATA:     Current support surface: Standard  Low air loss (BRISA pump, Isolibrium, Pulsate, skin guard, etc)  Containment of urine/stool: Incontinence Protocol, Incontinent pad in bed, and Purewick external catheter   BMI: Body mass index is 40.96 kg/m .   Active diet order: Orders Placed This Encounter      Moderate Consistent Carb (60 g CHO per Meal) Diet     Output: I/O last 3 completed shifts:  In: 810 [P.O.:760; I.V.:50]  Out: 1050 [Urine:1050]     Labs:   Recent Labs   Lab 10/10/23  0613   HGB 6.6*   INR 1.22*   WBC 6.2       Pressure injury risk assessment:   Sensory Perception: 3-->slightly limited  Moisture: 3-->occasionally moist  Activity: 2-->chairfast  Mobility: 2-->very limited  Nutrition: 3-->adequate  Friction and Shear: 2-->potential problem  Suresh Score: 15    Lizabeth Connor, RN, BSN, CWON   Pager no longer is use, please contact through [facility] group: [facility] Nurse Paramjit Clarkt. Office Number: [phone]

## 2023-10-10 NOTE — PLAN OF CARE
Goal Outcome Evaluation:  L BKA  Orientation A&Ox4    Vitals/Tele VSS RA, gave dilaudid for pain, on scheduled methadone    IV Access/drains R triple lumen PICC, wound vac 125 cont.    Diet Mod carb, BG checks 121, 119    Mobility Ax2 lift    GI/ Incontinent, purewick placed when patient needs to void    Wound/Skin Edema in extremities, rigo RLE w/ 2 mepilex on heel and calf, L BKA, L mepilex by groin, reddened raw perineum, baseline neuropathy in LE    Consults PT    Discharge Plan Pending TCU referrals    Hgb 6.6, one bag RBC infusing, recheck 1700      See Flow sheets for assessment

## 2023-10-11 ENCOUNTER — APPOINTMENT (OUTPATIENT)
Dept: PHYSICAL THERAPY | Facility: CLINIC | Age: 78
DRG: 617 | End: 2023-10-11
Attending: HOSPITALIST
Payer: COMMERCIAL

## 2023-10-11 LAB
ANION GAP SERPL CALCULATED.3IONS-SCNC: 6 MMOL/L (ref 7–15)
BUN SERPL-MCNC: 15.4 MG/DL (ref 8–23)
CALCIUM SERPL-MCNC: 10.1 MG/DL (ref 8.8–10.2)
CHLORIDE SERPL-SCNC: 103 MMOL/L (ref 98–107)
CREAT SERPL-MCNC: 0.93 MG/DL (ref 0.51–0.95)
DEPRECATED HCO3 PLAS-SCNC: 29 MMOL/L (ref 22–29)
EGFRCR SERPLBLD CKD-EPI 2021: 63 ML/MIN/1.73M2
ERYTHROCYTE [DISTWIDTH] IN BLOOD BY AUTOMATED COUNT: 14.5 % (ref 10–15)
GLUCOSE BLDC GLUCOMTR-MCNC: 108 MG/DL (ref 70–99)
GLUCOSE BLDC GLUCOMTR-MCNC: 112 MG/DL (ref 70–99)
GLUCOSE BLDC GLUCOMTR-MCNC: 119 MG/DL (ref 70–99)
GLUCOSE BLDC GLUCOMTR-MCNC: 149 MG/DL (ref 70–99)
GLUCOSE SERPL-MCNC: 121 MG/DL (ref 70–99)
HCT VFR BLD AUTO: 25.7 % (ref 35–47)
HGB BLD-MCNC: 8.1 G/DL (ref 11.7–15.7)
INR PPP: 1.24 (ref 0.85–1.15)
MCH RBC QN AUTO: 30 PG (ref 26.5–33)
MCHC RBC AUTO-ENTMCNC: 31.5 G/DL (ref 31.5–36.5)
MCV RBC AUTO: 95 FL (ref 78–100)
PLATELET # BLD AUTO: 187 10E3/UL (ref 150–450)
POTASSIUM SERPL-SCNC: 4.3 MMOL/L (ref 3.4–5.3)
RBC # BLD AUTO: 2.7 10E6/UL (ref 3.8–5.2)
SODIUM SERPL-SCNC: 138 MMOL/L (ref 135–145)
UFH PPP CHRO-ACNC: 0.37 IU/ML
WBC # BLD AUTO: 5.9 10E3/UL (ref 4–11)

## 2023-10-11 PROCEDURE — 85027 COMPLETE CBC AUTOMATED: CPT | Performed by: HOSPITALIST

## 2023-10-11 PROCEDURE — 250N000011 HC RX IP 250 OP 636: Mod: JZ | Performed by: STUDENT IN AN ORGANIZED HEALTH CARE EDUCATION/TRAINING PROGRAM

## 2023-10-11 PROCEDURE — 80048 BASIC METABOLIC PNL TOTAL CA: CPT | Performed by: HOSPITALIST

## 2023-10-11 PROCEDURE — G0463 HOSPITAL OUTPT CLINIC VISIT: HCPCS

## 2023-10-11 PROCEDURE — 250N000013 HC RX MED GY IP 250 OP 250 PS 637: Performed by: STUDENT IN AN ORGANIZED HEALTH CARE EDUCATION/TRAINING PROGRAM

## 2023-10-11 PROCEDURE — 85520 HEPARIN ASSAY: CPT | Performed by: HOSPITALIST

## 2023-10-11 PROCEDURE — 85610 PROTHROMBIN TIME: CPT | Performed by: STUDENT IN AN ORGANIZED HEALTH CARE EDUCATION/TRAINING PROGRAM

## 2023-10-11 PROCEDURE — 120N000001 HC R&B MED SURG/OB

## 2023-10-11 PROCEDURE — 97110 THERAPEUTIC EXERCISES: CPT | Mod: GP

## 2023-10-11 PROCEDURE — 99232 SBSQ HOSP IP/OBS MODERATE 35: CPT | Performed by: HOSPITALIST

## 2023-10-11 PROCEDURE — 99024 POSTOP FOLLOW-UP VISIT: CPT

## 2023-10-11 RX ADMIN — METHADONE HYDROCHLORIDE 5 MG: 5 TABLET ORAL at 20:31

## 2023-10-11 RX ADMIN — MICONAZOLE NITRATE: 2 POWDER TOPICAL at 20:35

## 2023-10-11 RX ADMIN — ATORVASTATIN CALCIUM 40 MG: 40 TABLET, FILM COATED ORAL at 20:31

## 2023-10-11 RX ADMIN — METHADONE HYDROCHLORIDE 10 MG: 10 TABLET ORAL at 15:37

## 2023-10-11 RX ADMIN — INSULIN DEGLUDEC 15 UNITS: 100 INJECTION, SOLUTION SUBCUTANEOUS at 22:36

## 2023-10-11 RX ADMIN — ASPIRIN 81 MG: 81 TABLET, COATED ORAL at 10:08

## 2023-10-11 RX ADMIN — METHADONE HYDROCHLORIDE 5 MG: 5 TABLET ORAL at 11:22

## 2023-10-11 RX ADMIN — SENNOSIDES AND DOCUSATE SODIUM 1 TABLET: 8.6; 5 TABLET ORAL at 20:31

## 2023-10-11 RX ADMIN — METHADONE HYDROCHLORIDE 5 MG: 5 TABLET ORAL at 06:18

## 2023-10-11 RX ADMIN — SENNOSIDES AND DOCUSATE SODIUM 1 TABLET: 8.6; 5 TABLET ORAL at 10:09

## 2023-10-11 RX ADMIN — HEPARIN SODIUM 1250 UNITS/HR: 10000 INJECTION, SOLUTION INTRAVENOUS at 17:12

## 2023-10-11 RX ADMIN — MICONAZOLE NITRATE: 2 POWDER TOPICAL at 10:10

## 2023-10-11 ASSESSMENT — ACTIVITIES OF DAILY LIVING (ADL)
ADLS_ACUITY_SCORE: 31
ADLS_ACUITY_SCORE: 30
ADLS_ACUITY_SCORE: 31
ADLS_ACUITY_SCORE: 30
ADLS_ACUITY_SCORE: 31
ADLS_ACUITY_SCORE: 31

## 2023-10-11 NOTE — PROGRESS NOTES
"[facility] Nurse Inpatient Assessment     Consulted for: 9/30 Wound diabetic foot ulcer left foot (now s/p BKA and wound vac placement); 10/2 Wound right buttocks.     Summary:   1) Left heel ulcer: s/p BKA 10/7, vac in place, changed by [facility] 10/10, plan for return to OR 10/13  2) BL legs: Chronic venous stasis with superficial healing wounds   3) Buttock/Coccyx: Mixture of friction and moisture and pressure, very painful and entire perineal area/ groin very macerated 10/11, bariatric low air loss mattress ordered   4) Left IT: Unstageable ulcer present on admission, stable 10/11      Patient History (according to provider note(s):      \"78 year old female admitted on 9/29/2023. She presents as a transfer from [facility] for L heel osteomyelitis \"    Assessment:      Areas visualized during today's visit: Heels , Sacrum/coccyx, and Lower extremities left     Wound location: Left BKA (not assessed 10/11 - assessment below is from 10/10)  Last photo: 10/10/23      Wound due to: Pressure Injury and Diabetic Ulcer, now surgical wound/amp  Wound history/plan of care: s/p BKA, saritha 10/7  Wound base: 30% bone, 20 % granulation tissue 50% muscle, adipose     Palpation of the wound bed: firm and fluctuance-     Drainage: moderate     Description of drainage: serosanguinous and bloody     Measurements (length x width x depth, in cm): 18  x 16  x 2cm to center      Tunneling: N/A     Undermining: N/A  Periwound skin: Dry/scaly, Edematous, and Macerated, maroon discoloration      Color:  scattered micro bleeding under tissues, steady trickle at 11 o'clock, pressure held, bleeding minimized to droplets.  -patient on heparin.        Temperature: normal   Odor: none  Pain: severe, score \"20/20\", Irritable or crying out at intervals, tension to hands, feet and body, facial expression of distress, and during dressing change, intermittent, stabbing, and sharp  Pain interventions prior to dressing " change: oral dilaudid, IV dilaudid, slow and gentle cares , and distraction  Treatment goal: Drainage control, Infection control/prevention, Maintain (prevention of deterioration), Protection, and Prepare wound bed for flap/graft  STATUS: stable  Supplies ordered: at bedside and discussed with patient     Negative pressure wound therapy applied to: Left BKA   Last photo: 10/10, see above   Wound due to: Surgical Wound   Wound history/plan of care:    Surgical date: 10/7/23   Service following: Vascular  Date Negative Pressure Wound Therapy initiated: 10/7/23   Interventions in place: repositioning, pressure redistribution with device or dressing, specialty surface in use, moisture/incontinence management, offloading, and elevation  Is patient s nutritional status compromised? no   If yes, what interventions are in place? N/A  Reason for initiating vac therapy? Presence of co-morbidities and Need for accelerated granulation tissue  Which?of?the?following?co-morbidities?apply? Diabetes, Immobility, Obesity, and Depression  If diabetic is patient on a diabetic management program? Yes   Is osteomyelitis present in wound? no   If yes what treatments are in place? N/A       Volume in cannister: 400ml     Last cannister change date: 10/7     Description of drainage: serosanguinous and bloody        Supplies ordered: gathered and discussed with patient , medium black granufoam, cavillon skin prep    Number of foam pieces removed from a wound (excluding foam for bridge) :  1 GranuFoam Black   Verified this matched the number of foam pieces applied last dressing change: Yes   Number of foam pieces packed into wound (excluding foam for bridge) :  1 GranuFoam Black        Wound location: Right posterior lower leg    Last photo: 10/11/23      Wound due to: Venous Ulcer  Wound history/plan of care: unclear  Chronic non healing ulcer, chronic edema with diabetes    Wound base: drying flaking white-yellow-pink tissue and serous  scabbing     Palpation of the wound bed: normal      Drainage: scant     Description of drainage: serosanguinous     Measurements (length x width x depth, in cm): 1.5  x 2  x  0.1 cm      Tunneling: N/A     Undermining: N/A  Periwound skin: Intact, Edematous, and Erythema- blanchable      Color: pink      Temperature: normal   Odor: none  Pain: denies , none  Pain interventions prior to dressing change: N/A  Treatment goal: Heal  and Infection control/prevention  STATUS: improving  Supplies ordered: at bedside, supplies stored on unit, and discussed with RN      Wound location: Left lateral LE  Last photo: 10/6/23        Wound due to: Venous Ulcer  Wound history/plan of care: Chronic non healing ulcer, chronic edema with diabetes  Wound base: pink, flaking epidermis     Palpation of the wound bed: normal      Drainage: scant     Description of drainage: serosanguinous     Measurements (length x width x depth, in cm): 2  x 0.8  x  0.1 cm (not measured 10/11)     Tunneling: N/A     Undermining: N/A  Periwound skin: Dry/scaly, Edematous       Color: normal and consistent with surrounding tissue      Temperature: normal   Odor: none  Pain: denies , none  Pain interventions prior to dressing change: N/A  Treatment goal: Heal   STATUS: resurfaced  Supplies ordered: at bedside      Wound location: Coccyx/buttock  Last photo: 10-11-23      Wound due to: Friction and Incontinence Associated Dermatitis (IAD)  Wound history/plan of care: Patient with wounds on admission. Pt reports that her buttocks and sacrum are painful. Found with Mepilex Sacral in place 10/11, was painful to remove and skin very macerated underneath, pt requesting to not reapply this type of dressing today   Wound base: patchy areas of moist red tissue, evidence of chronic tissue injury to bilateral buttocks and ITs     Palpation of the wound bed: normal      Drainage: moderate,  serosanguinous and bloody     Description of drainage: serosanguinous and  bloody     Measurements (length x width x depth, in cm):  10 x 6 x 0.1 cm (total area spanning buttocks)     Tunneling: N/A     Undermining: N/A  Periwound skin: Dry/scaly and Erythema- blanchable, flaking peeling epidermis, maceration especially in gluteal cleft and perineal folds; mild red denudement to inner labia under purewick      Color: dusky and purple      Temperature: normal   Odor: none  Pain: mild, tender  Pain interventions prior to dressing change: slow and gentle cares   Treatment goal: Heal , Drainage control, Decrease moisture, and Protection  STATUS: improving slowly  Supplies ordered: at bedside, supplies stored on unit, discussed with RN, and discussed with patient     Wound location: Left IT    Last photo: 10-11-23      Wound due to: Mixed Etiology: moisture, friction, pressure  Wound history/plan of care: Patient refuses to have HOB lowered. Wound covered with Triad. Patient has three incontinence pads under her as well as Purewick in place.    Wound base: Yellow /pink/ red marbled tissue, yellow tissue is adherent, fibrinous     Palpation of the wound bed: normal      Drainage: scant     Description of drainage: serosanguinous     Measurements (length x width x depth, in cm): 1.2  x 2  x  0.2 cm      Tunneling: N/A     Undermining: N/A  Periwound skin: Dry/scaly and Erythema- blanchable      Color: dusky and pink      Temperature: normal   Odor: none  Pain: mild, stinging  Pain interventions prior to dressing change: slow and gentle cares   Treatment goal: Heal , Drainage control, Decrease moisture, and Protection  STATUS: stable  Supplies ordered: at bedside, supplies stored on unit, and discussed with RN        Treatment Plan:     Bilateral lower legs: every 3 days and prn:  Cleanse any open areas with wound cleanser.  Cover with Mepilex.   Mepilex to right heel for protection prn.   Elevate legs; float right heel, cushion left BKA.     Buttock/ left IT/ perineal cares: BID and prn:  Cleanse  "with generous amount Marisol perineal lotion and soft dry cloths.  Spray the Marisol directly on the skin.  Ensure cleansing to base of all folds.  Do not need to remove all old paste every time, only outer soiled layers.   Apply antifungal powder to all skin folds.  Spread the powder with a dry cloth - this helps get a thin, even layer.    Apply Triad paste to buttock wounds and left IT wound, and any other open areas.    Leave open to air or apply Mepilex LITE over the buttock wounds as needed, to help keep the paste and wounds from sticking to the chux.     Place a flat layer of dry cloths or InterDry along anterior groin and pannus folds as needed to help prevent skin on skin chafing.     Trial using the PureWick only when needs to void.   PIP measures.  Bariatric low air loss mattress.      Negative pressure wound therapy plan:  Wound location: Left BKA   Change Days: Tues/ Fri by C RN    Supplies (including all accessories) used: medium Black foam , Adaptic/Curity oil emulsion contact layer , and Cavilon no sting barrier film  Cleanse with Normal saline and MicroKlenz prior to replacing VAC    Suction setting: -125   Methods used: Window paned all periwound skin with vac drape prior to applying sponge    Staff RN to assess integrity of dressing and ensure suction is set at appropriate level every shift.   Date canister. Chart canister output every shift. Change cannister weekly and PRN if full/occluded     Remove foam dressing and replace with BID normal saline moist gauze dressing if:   -a dressing failure which cannot be repaired within 2 hours   -patient is discharging to home without a home pump   -patient is discharging to a facility outside the local area   -if a dressing is a \"Silver Foam\", remove before Radiation Therapy or MRI     The hospital VAC pump is not to be discharged with the patient.?Ensure to disconnect patient from machine prior to discharge. Then,    - If a home KCI VAC pump has been " "delivered, connect home cannister to dressing tubing then connect cannister to home pump and turn on machine    - If transferring to a nearby facility with a KCI vac, can disconnect and clamp tubing then cover end with glove so can be reconnected within 2 hours       Pressure Injury Prevention (PIP) Plan:  -If patient is declining pressure injury prevention interventions: Explore reason why and address patient's concerns, Educate on pressure injury risk and prevention intervention(s), If patient is still declining, document \"informed refusal\" , and Ensure Care team is aware ( provider, charge nurse, etc)  -Mattress: Follow bed algorithm, reassess daily and order specialty mattress, if indicated.  -HOB: Maintain at or below 30 degrees, unless contraindicated  -Repositioning in bed: Every 1-2 hours , Left/right positioning; avoid supine, and Raise foot of bed prior to raising head of bed, to reduce patient sliding down (shear)  -Heels: Keep elevated off mattress  -Protective Dressing:  Mepilex to R heel  -Positioning Equipment:  pillows  -Chair positioning: Chair cushion (#[id number]) , Repositions self: patient to shift weight every 15 minutes, and Do NOT use a donut for sitting (this increases pressure to smaller area and creates a higher potential for injury)    -Moisture Management: Perineal cleansing /protection: Follow Incontinence Protocol, Avoid brief in bed, Clean and dry skin folds with bathing , and Moisturize dry skin  -Under Devices: Inspect skin under all medical devices during skin inspection , Ensure tubes are stabilized without tension, and Ensure patient is not lying on medical devices or equipment when repositioned     Orders: Reviewed, Written, and Updated    RECOMMEND PRIMARY TEAM ORDER: None, at this time  Education provided: plan of care, wound progress, Moisture management, Hygiene, and Off-loading pressure  Discussed plan of care with: Patient and Nurse   WOC nurse follow-up plan: Tuesday/Friday " for NPWT dressing changes and wound follow up  Notify [facility] if wound(s) deteriorate.  Nursing to notify the Provider(s) and re-consult the WO Nurse if new skin concern.    DATA:     Current support surface: Standard  Low air loss (BRISA pump, Isolibrium, Pulsate, skin guard, etc)  Containment of urine/stool: Incontinence Protocol, Incontinent pad in bed, and Purewick external catheter   BMI: Body mass index is 40.96 kg/m .   Active diet order: Orders Placed This Encounter      Moderate Consistent Carb (60 g CHO per Meal) Diet     Output: I/O last 3 completed shifts:  In: 1140 [P.O.:840]  Out: 1230 [Urine:1200; Drains:30]     Labs:   Recent Labs   Lab 10/11/23  0616   HGB 8.1*   INR 1.24*   WBC 5.9     Pressure injury risk assessment:   Sensory Perception: 4-->no impairment  Moisture: 4-->rarely moist  Activity: 1-->bedfast  Mobility: 2-->very limited  Nutrition: 3-->adequate  Friction and Shear: 1-->problem  Suresh Score: 15    Rae Romero RN CWOCN  -Securely message with [employer] ([facility] [employer])   -[facility] Office Phone: [phone] (messages checked periodically Mon-Fri 8a-4p)

## 2023-10-11 NOTE — PLAN OF CARE
AO x4. Fluctuating mood.  VSS on room air.  Hemoglobin 8.1 today.  Heparin XA 0.37.  Heparin drip infusing at 12.5 ml/hr via PICC line in right arm.  CHG wipes.  Took pills with water.  BGM per protocol.  Good appetite on mod cho diet.  Room service assistance.  CO generalized pain relieved with scheduled methadone.  Shifting weight for repositioning and pillow adjustments.  WOC rounded today to assess wounds.  Mepilex on right heel (skin intact) and right calf (open area).  Right lower leg cellulitis.  Heparin drip at 1250 unit(s)/hr.  Janeth area with various open areas and moist macerated skin, and scant bleeding via labia.  Using purewick when she feels urge to void, otherwise not in place.  Antifungal powder and barrier paste applied to perineum and buttock.  Wound vac on left BKA at 125 mmHg, sanguinous output.  Order placed for bariatric pulsate mattress and received today.

## 2023-10-11 NOTE — PROGRESS NOTES
"VASCULAR SURGERY PROGRESS NOTE    Subjective:  No new issues overnight.  Pain appears to be well controlled.  Vac working well.     Objective:  Intake/Output Summary (Last 24 hours) at 10/11/2023 0824  Last data filed at 10/11/2023 0619  Gross per 24 hour   Intake 1140 ml   Output 1230 ml   Net -90 ml     PHYSICAL EXAM:  /72 (BP Location: Left arm)   Pulse 68   Temp 98.2  F (36.8  C) (Oral)   Resp 16   Ht 1.676 m (5' 6\")   Wt 115.1 kg (253 lb 12 oz)   SpO2 92%   BMI 40.96 kg/m    Resting in bed, awake, alert, appropriate  Wound VAC to the left below-knee amputation stump  HGB stable      ASSESSMENT:  Postop day 4 status post left saritha CRUZ      PLAN:  -OR on Friday for fascia closure over stump  -continue heparin drip bridge for atrial fibrillation  -appreciate all teams involved      Vanita Carter NP  VASCULAR SURGERY                   "

## 2023-10-11 NOTE — PROGRESS NOTES
Care Management Follow Up    Length of Stay (days): 12    Expected Discharge Date: 10/15/2023     Concerns to be Addressed: adjustment to diagnosis/illness, coping/stress, discharge planning     Patient plan of care discussed at interdisciplinary rounds: Yes    Anticipated Discharge Disposition: Transitional Care     Anticipated Discharge Services:    Anticipated Discharge DME:      Patient/family educated on Medicare website which has current facility and service quality ratings:    Education Provided on the Discharge Plan: Yes  Patient/Family in Agreement with the Plan: yes    Referrals Placed by CM/SW:    Private pay costs discussed: Not applicable    Additional Information:  Patient most likely not ready for TCU until Sunday or Monday, writer let Jakob Villarreal know and asked about [facility] as an option.     LIZY Sol

## 2023-10-11 NOTE — PROGRESS NOTES
[facility]    Medicine Progress Note - Hospitalist Service    Date of Admission:  9/29/2023    Assessment & Plan   Linda Loredo is a 78 year old female admitted on 9/29/2023. She was transferred from [facility] for L heel osteomyelitis.  She underwent BKA on 10/7/23.  Plan to return to OR mid-week.     L foot decubitus ulcer status debridement by podiatry on 9/26 with multiple organisms, L heel osteomyelitis/cellulitis  Chronic venous stasis  MRSA positive hx  S/P BKA on 10/7/23  Acute blood loss anemia  Brought in 9/23 after welfare check by police found to have L leg swelling and erythema.  Hx venous stasis ulcers. Presents with L heel decubitus ulcer with osteo. Wound with multiple organisms incl pseudomonas, morganella, e faecalis, bordetella, proteus status underwent debridement 9/26 by podiatry.   Blood cultures done on 9/25/2023 have been negative.  ALMA w/ poor flow, podiatry at outside hospital requested transfer where there are vascular services.  Patient is currently on vancomycin and meropenem as per prior recommendations and status post PICC line placement on 9/27 and infectious disease, podiatry and vascular surgery team assisting.  After considering options she decided to proceed with amputation.   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   -  Remains on heparin drip, no plan to move to oral (warfarin) until after all surgeries completed  -  Monitor hgb, transfuse if <7  - planning TCU on discharge     Acute on chronic pain syndrome:   - continue methadone 10 mg  "daily midday and 5 mg TID  - IV and PO dilaudid prn for breakthrough pain (doses increased 10/10)     Urine culture positive for E faecalis, Staph aureus and E. Coli  -UA done at outside hospital on 9/23 showed above, she completed >10 day course of antibiotics with vancomycin, ceftriaxone, meropenem on 10/8     DM II:  [PTA degludec 35 units at HS, metformin 2000 mg daily, ozempic]  A1c 7.0 9/23/23. Has been on insulin pump in the past but this was changed to tresiba 8/2023.   - hold metformin, Ozempic   - medium resistance SSI  - Continue with tresiba 15 units at bedtime ( recommend holding dose 10/12 evening)     Atrial fibrillation  HTN  HLD:  [PTA- warfarin, simvastatin 20 mg daily, lisinopril 5 mg daily]  -Continue with statin, warfarin currently is on hold  - continue heparin gtt, start warfarin once all surgeries completed     CKD III:  -Baseline creatinine 1-1.2.   -Stable, monitor occasionally     Failure to thrive:  As above, called by police for welfare check. ? Safety at home. SW had seen at [facility].   -Social work team has been consulted, tentative plan is possible discharge to TCU depending on surgical recovery.     Obesity:  - Complicates care     [place]:  -continue home cpap       Diet: Moderate Consistent Carb (60 g CHO per Meal) Diet    DVT Prophylaxis: heparin gtt  Braga Catheter: Not present  Lines: PRESENT      PICC 10/09/23 Triple Lumen Right Basilic-Site Assessment: WDL      Cardiac Monitoring: None  Code Status: Full Code      Clinically Significant Risk Factors               # Coagulation Defect: INR = 1.24 (Ref range: 0.85 - 1.15) and/or PTT = N/A, will monitor for bleeding      # Hypertension: Noted on problem list         # DMII: A1C = 7.0 % (Ref range: <5.7 %) within past 6 months     # Severe Obesity: Estimated body mass index is 40.96 kg/m  as calculated from the following:    Height as of this encounter: 1.676 m (5' 6\").    Weight as of this encounter: 115.1 kg (253 lb 12 oz).           "     Disposition Plan      Expected Discharge Date: 10/15/2023      Destination: inpatient rehabilitation facility  Discharge Comments: amputation closure this week.          Jennifer Hernandez DO  Hospitalist Service  [facility]  Securely message with [employer] (more info)  Text page via [employer] Paging/Directory   ______________________________________________________________________    Interval History   Patient seen and examined. Having a better day today. Sleeps intermittently, but easily wakes up. Denies pain at stump, reports just having an awareness of it. She also reports phantom feelings of a heel on her left leg--but not painful. Tolerating diet. Discussed TCU, she is hopeful that some other locations may have bed availability for next week. Discussed briefly with RN and SW    Physical Exam   Vital Signs: Temp: 98.2  F (36.8  C) Temp src: Oral BP: 134/72 Pulse: 68   Resp: 16 SpO2: 92 % O2 Device: None (Room air)    Weight: 253 lbs 11.99 oz    Constitutional: Awake, alert, cooperative, no apparent distress  Respiratory: Clear to auscultation bilaterally, no crackles or wheezing  Cardiovascular: Regular rate and rhythm, normal S1 and S2, and no murmur noted  GI: Normal bowel sounds, soft, non-distended, non-tender  Skin/Integumen: No rashes, no cyanosis, left lower extremity with amputation--wound vac in place with sanguinous drainage in tubing  Other:  Right upper extremity PICC in place    Medical Decision Making       25 MINUTES SPENT BY ME on the date of service doing chart review, history, exam, documentation & further activities per the note.      Data     I have personally reviewed the following data over the past 24 hrs:    5.9  \   8.1 (L)   / 187     138 103 15.4 /  112 (H)   4.3 29 0.93 \     INR:  1.24 (H) PTT:  N/A   D-dimer:  N/A Fibrinogen:  N/A     Imaging results reviewed over the past 24 hrs:   No results found for this or any previous visit (from the past 24 hour(s)).

## 2023-10-11 NOTE — PLAN OF CARE
Summary: 10/10/23-10/11/23; [id number]  Diagnosis: Diabetic foot ulcer  POD: 4  Orientation: A/O x4  Vitals/Tele: soft BP on R  IV Access/drains: R PICC infusing continuous hep at 1,250 U/hr; LLE wound VAC following L BKA, purewick   Diet: Mod carb  Mobility: A2 Lift.  Pain: pain continuous around 6-8/10 managed with scheduled methadone, pt refuses prn pain management  GI/: pt uses purewick but requests it to be removed between uses, hardened sores on sreekanth area, no BM this shift  Wound/Skin: L BKA, pressure  sore on RLE heel, scattered bruising. Pt has pressure injuries and blancheable redness on backside but refused repositioning and turning for skin assessment. UTV. Education given on importance of turns and shifting weight. Patient shows understanding and states she weigh shifts independently.   Consults: PT following   Discharge Plan: expected discharge 10/13 to TCU, transport needs to be scheduled, pt unsure of TCU location

## 2023-10-12 ENCOUNTER — APPOINTMENT (OUTPATIENT)
Dept: PHYSICAL THERAPY | Facility: CLINIC | Age: 78
DRG: 617 | End: 2023-10-12
Attending: HOSPITALIST
Payer: COMMERCIAL

## 2023-10-12 LAB
ERYTHROCYTE [DISTWIDTH] IN BLOOD BY AUTOMATED COUNT: 14.6 % (ref 10–15)
GLUCOSE BLDC GLUCOMTR-MCNC: 113 MG/DL (ref 70–99)
GLUCOSE BLDC GLUCOMTR-MCNC: 122 MG/DL (ref 70–99)
GLUCOSE BLDC GLUCOMTR-MCNC: 130 MG/DL (ref 70–99)
GLUCOSE BLDC GLUCOMTR-MCNC: 137 MG/DL (ref 70–99)
GLUCOSE BLDC GLUCOMTR-MCNC: 174 MG/DL (ref 70–99)
GLUCOSE BLDC GLUCOMTR-MCNC: 96 MG/DL (ref 70–99)
HCT VFR BLD AUTO: 29.6 % (ref 35–47)
HGB BLD-MCNC: 9.3 G/DL (ref 11.7–15.7)
INR PPP: 1.18 (ref 0.85–1.15)
MCH RBC QN AUTO: 29.9 PG (ref 26.5–33)
MCHC RBC AUTO-ENTMCNC: 31.4 G/DL (ref 31.5–36.5)
MCV RBC AUTO: 95 FL (ref 78–100)
PLATELET # BLD AUTO: 190 10E3/UL (ref 150–450)
RBC # BLD AUTO: 3.11 10E6/UL (ref 3.8–5.2)
UFH PPP CHRO-ACNC: 0.43 IU/ML
WBC # BLD AUTO: 6.8 10E3/UL (ref 4–11)

## 2023-10-12 PROCEDURE — 250N000013 HC RX MED GY IP 250 OP 250 PS 637: Performed by: HOSPITALIST

## 2023-10-12 PROCEDURE — 85520 HEPARIN ASSAY: CPT | Performed by: HOSPITALIST

## 2023-10-12 PROCEDURE — 99233 SBSQ HOSP IP/OBS HIGH 50: CPT | Performed by: INTERNAL MEDICINE

## 2023-10-12 PROCEDURE — 250N000011 HC RX IP 250 OP 636: Mod: JZ | Performed by: STUDENT IN AN ORGANIZED HEALTH CARE EDUCATION/TRAINING PROGRAM

## 2023-10-12 PROCEDURE — 36415 COLL VENOUS BLD VENIPUNCTURE: CPT | Performed by: HOSPITALIST

## 2023-10-12 PROCEDURE — 250N000013 HC RX MED GY IP 250 OP 250 PS 637: Performed by: INTERNAL MEDICINE

## 2023-10-12 PROCEDURE — 97110 THERAPEUTIC EXERCISES: CPT | Mod: GP | Performed by: PHYSICAL THERAPIST

## 2023-10-12 PROCEDURE — 250N000013 HC RX MED GY IP 250 OP 250 PS 637: Performed by: STUDENT IN AN ORGANIZED HEALTH CARE EDUCATION/TRAINING PROGRAM

## 2023-10-12 PROCEDURE — 120N000001 HC R&B MED SURG/OB

## 2023-10-12 PROCEDURE — 85027 COMPLETE CBC AUTOMATED: CPT | Performed by: STUDENT IN AN ORGANIZED HEALTH CARE EDUCATION/TRAINING PROGRAM

## 2023-10-12 PROCEDURE — 99024 POSTOP FOLLOW-UP VISIT: CPT

## 2023-10-12 PROCEDURE — 85610 PROTHROMBIN TIME: CPT | Performed by: STUDENT IN AN ORGANIZED HEALTH CARE EDUCATION/TRAINING PROGRAM

## 2023-10-12 RX ORDER — AMOXICILLIN 250 MG
2 CAPSULE ORAL 2 TIMES DAILY
Status: DISCONTINUED | OUTPATIENT
Start: 2023-10-12 | End: 2023-10-24 | Stop reason: HOSPADM

## 2023-10-12 RX ORDER — ARGININE/GLUTAMINE/CALCIUM BMB 7G-7G-1.5G
1 POWDER IN PACKET (EA) ORAL 2 TIMES DAILY
Status: DISCONTINUED | OUTPATIENT
Start: 2023-10-12 | End: 2023-10-19

## 2023-10-12 RX ORDER — MULTIPLE VITAMINS W/ MINERALS TAB 9MG-400MCG
1 TAB ORAL DAILY
Status: DISCONTINUED | OUTPATIENT
Start: 2023-10-12 | End: 2023-10-24 | Stop reason: HOSPADM

## 2023-10-12 RX ADMIN — MICONAZOLE NITRATE: 2 POWDER TOPICAL at 20:46

## 2023-10-12 RX ADMIN — SENNOSIDES AND DOCUSATE SODIUM 1 TABLET: 8.6; 5 TABLET ORAL at 08:14

## 2023-10-12 RX ADMIN — METHADONE HYDROCHLORIDE 10 MG: 10 TABLET ORAL at 15:59

## 2023-10-12 RX ADMIN — ASPIRIN 81 MG: 81 TABLET, COATED ORAL at 08:14

## 2023-10-12 RX ADMIN — MULTIPLE VITAMINS W/ MINERALS TAB 1 TABLET: TAB at 14:08

## 2023-10-12 RX ADMIN — ATORVASTATIN CALCIUM 40 MG: 40 TABLET, FILM COATED ORAL at 20:44

## 2023-10-12 RX ADMIN — METHADONE HYDROCHLORIDE 5 MG: 5 TABLET ORAL at 20:44

## 2023-10-12 RX ADMIN — SENNOSIDES AND DOCUSATE SODIUM 2 TABLET: 50; 8.6 TABLET ORAL at 20:43

## 2023-10-12 RX ADMIN — HEPARIN SODIUM 1250 UNITS/HR: 10000 INJECTION, SOLUTION INTRAVENOUS at 14:08

## 2023-10-12 RX ADMIN — METHADONE HYDROCHLORIDE 5 MG: 5 TABLET ORAL at 06:05

## 2023-10-12 RX ADMIN — HYDROMORPHONE HYDROCHLORIDE 4 MG: 2 TABLET ORAL at 09:24

## 2023-10-12 RX ADMIN — METHADONE HYDROCHLORIDE 5 MG: 5 TABLET ORAL at 10:38

## 2023-10-12 RX ADMIN — MICONAZOLE NITRATE: 2 POWDER TOPICAL at 09:36

## 2023-10-12 ASSESSMENT — ACTIVITIES OF DAILY LIVING (ADL)
ADLS_ACUITY_SCORE: 39
ADLS_ACUITY_SCORE: 39
ADLS_ACUITY_SCORE: 35
ADLS_ACUITY_SCORE: 31
ADLS_ACUITY_SCORE: 35
ADLS_ACUITY_SCORE: 39
ADLS_ACUITY_SCORE: 39
ADLS_ACUITY_SCORE: 31
ADLS_ACUITY_SCORE: 39
ADLS_ACUITY_SCORE: 35

## 2023-10-12 NOTE — PLAN OF CARE
POD 5 from a L BKA. A&O. Agitated at times, frustrated with staff, frequent repeated requests to staff. CMS - 2+ dependent edema, baseline neuropathy to all extremities, LLE dry & rigo with 1+ pulse. LLE heel & calf dressings CDI. Bowel sounds +x4, + flatus, tolerating consistent CHO diet. No BM noted since 10/6, patient refusing mirilax - MD aware, senna increased. VSS. Heparin gtt infusing at 1250, therapeutic. Wound vac WDL to -125 mm Hg. Not OOB this shift - refusing to use lift, would not explain why she doesn't want to use the lift. Refusing repositioning at times, see previous progress note. CHG bath & linen changed provided. Buttock cares completed this evening - small amount of bleeding in gluteal cleft. C/o moderate pain, decreased with dilaudid. Plan for surgery tomorrow, NPO at midnight.

## 2023-10-12 NOTE — PLAN OF CARE
Goal Outcome Evaluation:    BKA  DATE & TIME: 10/11/2023 5180-8191    Cognitive Concerns/ Orientation : A/Ox4   BEHAVIOR & AGGRESSION TOOL COLOR: Green   ABNL VS/O2: Vss on room air   MOBILITY: A2 lift   PAIN MANAGMENT: none given on this shift, Scheduled Methadone   DIET: Mob Carb   BOWEL/BLADDER: Incontinent BB, Purwick- pt will ask when ready to urinate   ABNL LAB/BG: BG 96,122,137  DRAIN/DEVICES: RPICC infusing Heparin 1250ml/hr, Lines flushed with blood return   TELEMETRY RHYTHM: None   SKIN: See Wo nurse Sacrum wounds, BKA- left, Wound right lower leg, rash perineum, inner dry on place, wound cares done   TESTS/PROCEDURES: OR mid week   D/C DATE: 10/15 TCU   Discharge Barriers: TBD   OTHER IMPORTANT INFO: MRSA contact precautions

## 2023-10-12 NOTE — PROGRESS NOTES
[facility]    HOSPITALIST PROGRESS NOTE :   --------------------------------------------------    Date of Admission:  9/29/2023    Cumulative Summary: Linda Loredo is a 78 year old female admitted on 9/29/2023. She was transferred from [facility] for L heel osteomyelitis.  She underwent BKA on 10/7/23.  Plan to return to OR mid-week.     Assessment & Plan     L foot decubitus ulcer status debridement by podiatry on 9/26 with multiple organisms, L heel osteomyelitis/cellulitis  Chronic venous stasis  MRSA positive hx  S/P BKA on 10/7/23  Acute blood loss anemia  Brought in 9/23 after welfare check by police found to have L leg swelling and erythema.  Hx venous stasis ulcers. Presents with L heel decubitus ulcer with osteo. Wound with multiple organisms incl pseudomonas, morganella, e faecalis, bordetella, proteus status underwent debridement 9/26 by podiatry.   Blood cultures done on 9/25/2023 have been negative.  ALMA w/ poor flow, podiatry at outside hospital requested transfer where there are vascular services.  Patient is currently on vancomycin and meropenem as per prior recommendations and status post PICC line placement on 9/27 and infectious disease, podiatry and vascular surgery team assisting.  After considering options she decided to proceed with amputation.   * 10/7/23 Left guillotine below knee amputation  *10/10 1u PRBCs given for hgb 6.6    -- Patient care was assumed this morning , seen and examined   -- As above, patient underwent left below-knee amputation on October/7/23.  --Pain medications were adjusted as wound VAC change on October 10 was very painful  -- Patient requested to be made aware been she is planned to undergo her next wound VAC change so she can get as needed medication half an hour before.  -- Plan for completion of left BKA on October 13 th  -- Will discuss with vascular surgery if patient should be continued on heparin infusion or needs to be  held 6 hours before the procedure.  -- Remained on IV meropenem and IV vanco until 24 hours post-amputation per ID recommendation (last dosed 10/8 evening)  --  Post-op site cares, anticoagulation, activity restrictions, pain meds per surgical team.   --  Remains on heparin drip, no plan to move to oral (warfarin) until after all surgeries completed  --  Monitor hgb, transfuse if <7  --  planning TCU on discharge  -- Make patient n.p.o. after midnight, will adjust insulin, see below.     Acute on chronic pain syndrome:   -- Continue methadone 10 mg daily midday and 5 mg TID  -- IV and PO dilaudid prn for breakthrough pain (doses increased 10/10)     Urine culture positive for E faecalis, Staph aureus and E. Coli  -- UA done at outside hospital on 9/23 showed above, she completed >10 day course of antibiotics with vancomycin, ceftriaxone, meropenem on 10/8     DM II:  [PTA degludec 35 units at HS, metformin 2000 mg daily, ozempic]  A1c 7.0 9/23/23. Has been on insulin pump in the past but this was changed to tresiba 8/2023.     -- hold metformin, Ozempic   -- medium resistance SSI  -- Patient has been receiving Tresiba 15 units at bedtime, will hold tonight in anticipation of patient being n.p.o. and going for surgery tomorrow morning, okay to administer medium dose sliding scale.     Atrial fibrillation  HTN  HLD:  [PTA- warfarin, simvastatin 20 mg daily, lisinopril 5 mg daily]    -- Continue with statin, warfarin currently is on hold  -- Continue heparin gtt, start warfarin once all surgeries completed     CKD III:  --Baseline creatinine 1-1.2.   --Stable, monitor occasionally     Failure to thrive:  As above, called by police for welfare check. ? Safety at home. SW had seen at [facility].     --Social work team has been consulted, tentative plan is possible discharge to TCU depending on surgical recovery.  --According to patient, currently patient lives in independent living and is usually able to take care of  "activities of daily living.  Daughter and son-in-law live very close at 1 mile distance from her home.  Daughter is helpful in bringing her groceries and cooked meal from her home.     Obesity:  -- Complicates care     BERLIN:  -- continue home cpap    Clinically Significant Risk Factors               # Coagulation Defect: INR = 1.24 (Ref range: 0.85 - 1.15) and/or PTT = N/A, will monitor for bleeding    # Hypertension: Noted on problem list       # DMII: A1C = 7.0 % (Ref range: <5.7 %) within past 6 months   # Severe Obesity: Estimated body mass index is 40.96 kg/m  as calculated from the following:    Height as of this encounter: 1.676 m (5' 6\").    Weight as of this encounter: 115.1 kg (253 lb 12 oz).             Diet: Moderate Consistent Carb (60 g CHO per Meal) Diet    Braga Catheter: Not present  DVT Prophylaxis: Heparin GTT  Code Status: Full Code    The patient's care was discussed with the Patient.    Medical Decision Making     55 MINUTES SPENT BY ME on the date of service doing chart review, history, exam, documentation & further activities per the note.        Disposition Plan     Expected Discharge Date: 10/15/2023      Destination: inpatient rehabilitation facility  Discharge Comments: amputation closure this week.     Entered: Divya Song MD 10/12/2023, 8:15 AM       Divya Song MD, MD, FACP  Text Page (7am - 6pm)    ----------------------------------------------------------------------------------------------------------------------      Interval History   Patient care was assumed this morning, patient was seen and examined.  Patient seems slightly frustrated about getting poked for lab work as her line has been getting heparin infusion, was requesting if heparin can be held for a little while while patient gets her labs drawn, will discuss with bedside nursing.  Patient is also requesting her upper left-sided bed railing to be down while patient is alert and awake so she can reach her table " easily.  Patient had questions regarding her surgical procedure tomorrow, answered to the best of my ability, she understood that surgery team can consider her further questions.  All the questions were answered we discussed about increasing her stool softener and holding her Tresiba tonight in anticipation of procedure tomorrow.    -Data reviewed today: I reviewed all new labs and imaging results over the last 24 hours.    I personally reviewed no images or EKG's today.    Physical Exam   Temp: 98.1  F (36.7  C) Temp src: Oral BP: 127/71 Pulse: 74   Resp: 16 SpO2: 95 % O2 Device: None (Room air)    Vitals:    10/08/23 0624 10/09/23 0419 10/10/23 0530   Weight: 114.3 kg (251 lb 15.8 oz) 123.1 kg (271 lb 6.2 oz) 115.1 kg (253 lb 12 oz)     Vital Signs with Ranges  Temp:  [98  F (36.7  C)-98.2  F (36.8  C)] 98.1  F (36.7  C)  Pulse:  [68-81] 74  Resp:  [16] 16  BP: (116-134)/(65-72) 127/71  SpO2:  [92 %-95 %] 95 %  I/O last 3 completed shifts:  In: -   Out: 1700 [Urine:225; Drains:1475]    GENERAL: Alert , awake and oriented. NAD. Conversational, appropriate.   HEENT: Normocephalic. EOMI. No icterus or injection. Nares normal.   LUNGS: Clear to auscultation. No dyspnea at rest.   HEART: Regular rate. Extremities perfused.   ABDOMEN: Soft, nontender, and nondistended. Positive bowel sounds.   EXTREMITIES: Left lower extremity with amputation, wound VAC in place with sanguinous drainage in tubing, right upper extremity PICC line in place  NEUROLOGIC: Moves extremities x4 on command. No acute focal neurologic abnormalities noted.     Medications    heparin 1,250 Units/hr (10/11/23 1712)    - MEDICATION INSTRUCTIONS -        aspirin  81 mg Oral Daily    atorvastatin  40 mg Oral QPM    insulin aspart  1-7 Units Subcutaneous TID AC    insulin aspart  1-5 Units Subcutaneous At Bedtime    insulin degludec  15 Units Subcutaneous At Bedtime    methadone  10 mg Oral Daily    methadone  5 mg Oral TID    miconazole   Topical BID     senna-docusate  1 tablet Oral BID    sodium chloride (PF)  10-40 mL Intracatheter Q8H       Data   Recent Labs   Lab 10/12/23  0623 10/12/23  0203 10/11/23  2214 10/11/23  0822 10/11/23  0616 10/10/23  2229 10/10/23  1726 10/10/23  1156 10/10/23  0613 10/09/23  0807 10/09/23  0248   WBC  --   --   --   --  5.9  --   --   --  6.2  --  6.0   HGB  --   --   --   --  8.1*  --  8.1*  --  6.6*  --  7.4*   MCV  --   --   --   --  95  --   --   --  97  --  98   PLT  --   --   --   --  187  --   --   --  201  --  174   INR  --   --   --   --  1.24*  --   --   --  1.22*  --  1.37*   NA  --   --   --   --  138  --   --   --  139  --  141   POTASSIUM  --   --   --   --  4.3  --   --   --  4.5  --  4.4   CHLORIDE  --   --   --   --  103  --   --   --  103  --  103   CO2  --   --   --   --  29  --   --   --  28  --  29   BUN  --   --   --   --  15.4  --   --   --  15.3  --  15.2   CR  --   --   --   --  0.93  --   --   --  0.92  --  0.96*   ANIONGAP  --   --   --   --  6*  --   --   --  8  --  9   ZAKIA  --   --   --   --  10.1  --   --   --  10.1  --  9.6   GLC 96 122* 137*   < > 121*   < >  --    < > 121*   < > 165*    < > = values in this interval not displayed.       Imaging:   No results found for this or any previous visit (from the past 24 hour(s)).

## 2023-10-12 NOTE — PROGRESS NOTES
"VASCULAR SURGERY PROGRESS NOTE    Subjective:  No new issues overnight.  Pain appears to be well controlled.  Vac working well.     Objective:  Intake/Output Summary (Last 24 hours) at 10/12/2023 1320  Last data filed at 10/12/2023 0600  Gross per 24 hour   Intake --   Output 1700 ml   Net -1700 ml     PHYSICAL EXAM:  /55 (BP Location: Left arm)   Pulse 75   Temp 97.7  F (36.5  C) (Oral)   Resp 16   Ht 1.676 m (5' 6\")   Wt 115.1 kg (253 lb 12 oz)   SpO2 95%   BMI 40.96 kg/m    Resting in bed, awake, alert, appropriate  Wound VAC to the left below-knee amputation stump  HGB stable    ASSESSMENT:  Postop day 5 status post left saritha CRUZ       PLAN:  -OR on Friday for fascia closure over stump  -continue heparin drip bridge for atrial fibrillation  -appreciate all teams involved    Vanita Carter NP  VASCULAR SURGERY                   "

## 2023-10-12 NOTE — PROGRESS NOTES
Care Management Follow Up    Length of Stay (days): 13    Expected Discharge Date: 10/16/2023     Concerns to be Addressed: adjustment to diagnosis/illness, coping/stress, discharge planning     Patient plan of care discussed at interdisciplinary rounds: Yes    Anticipated Discharge Disposition: Transitional Care     Anticipated Discharge Services:    Anticipated Discharge DME:      Patient/family educated on Medicare website which has current facility and service quality ratings:    Education Provided on the Discharge Plan: Yes  Patient/Family in Agreement with the Plan: yes    Referrals Placed by CM/SW:    Private pay costs discussed: Not applicable    Additional Information:  Writer checked on availability of TCU facilities for Monday, Trini ([place] Liaison) was not sure if either [facility] Or [facility] would have bed availablity, need to resend referral when patient is ready.     Addendum - looking like closer to Monday or Tuesday, surgery now Saturday.     LIZY Sol

## 2023-10-12 NOTE — PROGRESS NOTES
"SPIRITUAL HEALTH SERVICES - Progress Note    Gen Surg    Referral Source: follow up   Saw pt. Linda \"Pat\" Awa Loredo. She expressed that she's had  a \"difficult day\" thus far and recounted some stories of \"frustration.\" She said it \"felt good\" to express her concerns and we briefly brainstormed how she would be most comfortable in resolving the parts that were still bothering her. She expressed that this hospitalization has been \"very emotional\" and shared how \"grateful\" she is for the support she has around her. She requested prayer - and then an important phone call came in. I will return later in the day to fulfill that request.    Update (4:00 pm): I was able to provide a prayer for Margaret, as she requested, prior to her surgery tomorrow.    Plan: Spiritual Health remains available as additional needs arise.      Loretta Cat  Chaplain Resident    [facility] routine referrals *[phone]    [facility] available 24/7 for emergent requests/referrals, either by paging the on-call  or by entering an ASAP/STAT consult in Epic (this will also page the on-call ).    "

## 2023-10-12 NOTE — PLAN OF CARE
Goal Outcome Evaluation:      Plan of Care Reviewed With: patient      Summary: 10/11/23; [id number]    Diagnosis: Diabetic foot ulcer, left BKA  POD: 4  Orientation: A/O x4  Vitals/Tele:   IV Access/drains: R PICC infusing continuous hep at 1,250 U/hr; LLE wound VAC following L BKA, PRN purewick   Diet: Mod carb with BGM   Mobility: A2 Lift. Not OOB  Pain: scheduled methadone, pt refuses prn pain management  GI/: pt uses purewick but requests it to be removed between uses, hardened sores on sreekanth area, no BM this shift  Wound/Skin: L BKA, pressure  sore on RLE heel and left calf, scattered bruising. Pt has pressure injuries and blancheable redness on backside..   Consults: PT   Discharge Plan: TBD  Other Import Info:  Antifungal powder and barrier paste applied to perineum and buttock.  Wound vac on left BKA at 125 mmHg.  On bariatric pulsate mattress.

## 2023-10-12 NOTE — PROGRESS NOTES
"CLINICAL NUTRITION SERVICES  -  ASSESSMENT NOTE      Recommendations Ordered by Registered Dietitian (RD):   Ordered 2 pm strawberry Glucerna   Ordered BID Dave for wound healing  Ordered multivitamin w/ minerals (per staff rec) for wound healing    RD introduced self to pt, discussed role in care. RD encouraged pt to continue ordering adequate meals, 3/day. Encouraged protein for wound healing. Pt would like to have Glucerna she received previously. Likes strawberry only. Would like 1/day @ 2 pm. Also willing to try Dave BID. Discussed its impact for wound healing. Encouraged pt to have 2/day for 2-3 weeks. Pt took notes over visit so she will remember the key points. Was very appreciative of visit.      Malnutrition:   % Weight Loss:  Weight loss does not meet criteria for malnutrition  % Intake:  Decreased intake does not meet criteria for malnutrition   Subcutaneous Fat Loss:  None observed  Muscle Loss:  None observed  Fluid Retention:  Mild right LE + left leg edema     Malnutrition Diagnosis: Patient does not meet two of the established criteria necessary for diagnosing malnutrition         REASON FOR ASSESSMENT  Linda Loredo is a 78 year old female seen by Registered Dietitian for [facility]      NUTRITION HISTORY  Information obtained from chart review and d/w pt    PMH of Atrial fibrillation, HTN, HLD, T2DM, CKD stage 3, anemia, BERLIN     Per chart review, pt known to [facility] RD services. Assessed 9/25/23. Nutrition hx obtained = \"Patient reports that her appetite is sporadic. She notes that she typically will eat at least two protein focused meals per day. Patient further notes that she has also started to implement protein shakes at home. Patient denied any chewing or swallowing difficulties. Linda noted no chronic GI concerns at this time. RD went over the importance of protein for wound healing and immunity. Patient was agreeable to a trial of glucerna with a preference for strawberry " "flavors.\" Pt did not meet malnutrition criteria.       CURRENT NUTRITION ORDERS  Diet: Moderate Consistent Carb (60 g CHO per Meal) Diet      Current Intake/Tolerance:  Pt was very pleasant. Reported she has been eating well. RD encouraged pt to continue ordering adequate meals, 3/day. Encouraged protein for wound healing. Pt would like to have Glucerna she received previously. Likes strawberry only. Would like 1/day @ 2 pm. Also willing to try Dave BID. Discussed its impact for wound healing. Encouraged pt to have 2/day for 2-3 weeks. Pt took notes over visit so she will remember the key points. Was very appreciative of visit.   Pt is tolerating diet per chart review.   Pt has been receiving 3 meals/day when diet allows per health touch. Yesterday she received 1645 kcal and 87 g protein. % intakes mostly documented recently per nursing flow sheet.      PER CHART REVIEW  Transfer from [facility] for L heel osteomyelitis     Admitted for:   L foot decubitus ulcer   L heel osteomyelitis   L foot cellulitis   Failure to thrive    10/7: LEFT OLYANDREINE BELOW KNEE AMPUTATION.     WOC following, last assessed 10/11  Wound location: Left BKA (not assessed 10/11 - assessment below is from 10/10)   Wound due to: Pressure Injury and Diabetic Ulcer, now surgical wound/amp  Wound history/plan of care: s/p saritha CRUZ 10/7  STATUS: stable   Negative pressure wound therapy applied to: Left BKA   Wound due to: Surgical Wound    Wound location: Right posterior lower leg   Wound due to: Venous Ulcer  Wound history/plan of care: unclear  STATUS: improving   Wound location: Left lateral LE   Wound due to: Venous Ulcer  Wound history/plan of care: Chronic non healing ulcer, chronic edema with diabetes  STATUS: resurfaced   Wound location: Coccyx/buttock   Wound due to: Friction and Incontinence Associated Dermatitis (IAD)  Wound history/plan of care: Patient with wounds on admission. Pt reports that her buttocks and sacrum are " "painful. Found with Mepilex Sacral in place 10/11, was painful to remove and skin very macerated underneath, pt requesting to not reapply this type of dressing today   STATUS: improving slowly   Wound location: Left IT   Wound due to: Mixed Etiology: moisture, friction, pressure  Wound history/plan of care: Patient refuses to have HOB lowered. Wound covered with Triad. Patient has three incontinence pads under her as well as Purewick in place.    STATUS: stable     Discharge to TCU when medically ready, placement found       NUTRITION FOCUSED PHYSICAL ASSESSMENT FOR DIAGNOSING MALNUTRITION)  Yes         Observed:    No nutrition-related physical findings observed  Minimal loss expected d/t age. Additional loss may be hidden d/t body habitus     Obtained from Chart/Interdisciplinary Team:  Various wounds, as above     ANTHROPOMETRICS  Height: 5' 6\"  Weight: 253 lbs 11.99 oz  Body mass index is 43.5 kg/m . (Adjusted for left BKA)   Weight Status:  Obesity Grade III BMI >40  IBW: 55.6 kg (-5.9% adjustment for left BKA)  % IBW: 207%  Weight History:   Wt loss of 4.4 kg over past 10-11 months (3.6%)  10/10/23  09/26/23 115.1 kg (253 lb 12 oz) --> post BKA  116.4 kg (256 lb 9.9 oz) --> this admit wt   09/26/23 120.2 kg (265 lb)   06/22/23 111.4 kg (245 lb 8 oz)   06/19/23 111.4 kg (245 lb 8 oz)   06/16/23 111.4 kg (245 lb 8 oz)   06/13/23 111.5 kg (245 lb 12.8 oz)   06/01/23 115.8 kg (255 lb 6.4 oz)   05/28/23 117.3 kg (258 lb 8 oz)   11/29/22 120.8 kg (266 lb 6.4 oz)   11/23/22 119.7 kg (264 lb)   11/23/22 119.7 kg (264 lb)   11/21/22 111.6 kg (246 lb)   11/18/22 119.7 kg (264 lb)   11/16/22 119.7 kg (264 lb)   11/14/22 111.6 kg (246 lb)   11/10/22 117.9 kg (260 lb)   11/02/10 132 kg (291 lb)     Care everywhere--  05/02/23 : 118.2 kg (260 lb 9.6 oz)  11/04/22 : 117.9 kg (260 lb)      LABS  Lab 10/12/23  0623 10/12/23  0203 10/11/23  2214 10/11/23  1717 10/11/23  1214 10/11/23  0822   GLC 96 122* 137* 119* 112* 108*    "     MEDICATIONS   insulin aspart (MSSI)  1-7 Units Subcutaneous TID AC    insulin aspart (MSSI)  1-5 Units Subcutaneous At Bedtime    insulin degludec  15 Units Subcutaneous At Bedtime    senna-docusate  1 tablet Oral BID       ASSESSED NUTRITION NEEDS PER APPROVED PRACTICE GUIDELINES:  Dosing Weight: 70.5 kg (adjusted, based on 115.1 kg-- 10/10)  Estimated Energy Needs: 8803-0765 kcal (25-30 Kcal/Kg)  Justification: wound healing w/ BMI in mind  Estimated Protein Needs:  grams protein (1.2-1.5 g pro/Kg)  Justification: wound healing  Estimated Fluid Needs: 1 mL/kcal  Justification: maintenance       MALNUTRITION:  % Weight Loss:  Weight loss does not meet criteria for malnutrition  % Intake:  Decreased intake does not meet criteria for malnutrition   Subcutaneous Fat Loss:  None observed  Muscle Loss:  None observed  Fluid Retention:  Mild right LE + left leg edema     Malnutrition Diagnosis: Patient does not meet two of the established criteria necessary for diagnosing malnutrition        NUTRITION DIAGNOSIS:  Predicted inadequate nutrient intake related to increased needs 2/2 wound healing, potential for menu fatigue 2/2 LOS         NUTRITION INTERVENTIONS  Recommendations / Nutrition Prescription  Ordered 2 pm strawberry Glucerna   Ordered BID Dave for wound healing  Ordered multivitamin w/ minerals (per staff rec) for wound healing  RD introduced self to pt, discussed role in care. RD encouraged pt to continue ordering adequate meals, 3/day. Encouraged protein for wound healing. Pt would like to have Glucerna she received previously. Likes strawberry only. Would like 1/day @ 2 pm. Also willing to try Dave BID. Discussed its impact for wound healing. Encouraged pt to have 2/day for 2-3 weeks. Pt took notes over visit so she will remember the key points. Was very appreciative of visit.       Implementation  Medical food supplement therapy  Multivitamin/mineral supplement therapy  Nutrition education      Nutrition Goals  Pt to consume >75% of 3 meals/day w/ protein at each + supplements         MONITORING AND EVALUATION:  Progress towards goals will be monitored and evaluated per protocol and Practice Guidelines      Alexa Hazel RD, LD   Pager: [phone]

## 2023-10-12 NOTE — PROGRESS NOTES
"Patient got upset with writer this AM after being told that she needed to be repositioning in bed every 1-2 hours. Patient stated \"This bed does that for me\" Writer explained that while she is on a pulsate mattress, she still needs to be turning side to side. After bringing supervisor in to confirm writers instructions about repositioning patient was agreeable to doing so. However patient still refusing throughout the day i.e. when pt was eating lunch and when  visited. Will continue to re approach & reinforce education.  "

## 2023-10-13 ENCOUNTER — APPOINTMENT (OUTPATIENT)
Dept: PHYSICAL THERAPY | Facility: CLINIC | Age: 78
DRG: 617 | End: 2023-10-13
Attending: HOSPITALIST
Payer: COMMERCIAL

## 2023-10-13 LAB
ANION GAP SERPL CALCULATED.3IONS-SCNC: 8 MMOL/L (ref 7–15)
BUN SERPL-MCNC: 17.8 MG/DL (ref 8–23)
CALCIUM SERPL-MCNC: 10.2 MG/DL (ref 8.8–10.2)
CHLORIDE SERPL-SCNC: 104 MMOL/L (ref 98–107)
CREAT SERPL-MCNC: 1.01 MG/DL (ref 0.51–0.95)
CREAT SERPL-MCNC: 1.01 MG/DL (ref 0.51–0.95)
DEPRECATED HCO3 PLAS-SCNC: 30 MMOL/L (ref 22–29)
EGFRCR SERPLBLD CKD-EPI 2021: 57 ML/MIN/1.73M2
EGFRCR SERPLBLD CKD-EPI 2021: 57 ML/MIN/1.73M2
GLUCOSE BLDC GLUCOMTR-MCNC: 129 MG/DL (ref 70–99)
GLUCOSE BLDC GLUCOMTR-MCNC: 135 MG/DL (ref 70–99)
GLUCOSE BLDC GLUCOMTR-MCNC: 158 MG/DL (ref 70–99)
GLUCOSE BLDC GLUCOMTR-MCNC: 165 MG/DL (ref 70–99)
GLUCOSE BLDC GLUCOMTR-MCNC: 168 MG/DL (ref 70–99)
GLUCOSE BLDC GLUCOMTR-MCNC: 171 MG/DL (ref 70–99)
GLUCOSE SERPL-MCNC: 143 MG/DL (ref 70–99)
HGB BLD-MCNC: 8.4 G/DL (ref 11.7–15.7)
INR PPP: 1.15 (ref 0.85–1.15)
POTASSIUM SERPL-SCNC: 4 MMOL/L (ref 3.4–5.3)
SODIUM SERPL-SCNC: 142 MMOL/L (ref 135–145)
UFH PPP CHRO-ACNC: 0.28 IU/ML

## 2023-10-13 PROCEDURE — 97530 THERAPEUTIC ACTIVITIES: CPT | Mod: GP

## 2023-10-13 PROCEDURE — 250N000011 HC RX IP 250 OP 636: Performed by: HOSPITALIST

## 2023-10-13 PROCEDURE — 250N000011 HC RX IP 250 OP 636: Mod: JZ | Performed by: STUDENT IN AN ORGANIZED HEALTH CARE EDUCATION/TRAINING PROGRAM

## 2023-10-13 PROCEDURE — 250N000013 HC RX MED GY IP 250 OP 250 PS 637: Performed by: STUDENT IN AN ORGANIZED HEALTH CARE EDUCATION/TRAINING PROGRAM

## 2023-10-13 PROCEDURE — 99024 POSTOP FOLLOW-UP VISIT: CPT

## 2023-10-13 PROCEDURE — 250N000013 HC RX MED GY IP 250 OP 250 PS 637: Performed by: INTERNAL MEDICINE

## 2023-10-13 PROCEDURE — 250N000013 HC RX MED GY IP 250 OP 250 PS 637: Performed by: HOSPITALIST

## 2023-10-13 PROCEDURE — 80048 BASIC METABOLIC PNL TOTAL CA: CPT | Performed by: INTERNAL MEDICINE

## 2023-10-13 PROCEDURE — 97606 NEG PRS WND THER DME>50 SQCM: CPT

## 2023-10-13 PROCEDURE — 85018 HEMOGLOBIN: CPT | Performed by: INTERNAL MEDICINE

## 2023-10-13 PROCEDURE — 99233 SBSQ HOSP IP/OBS HIGH 50: CPT | Performed by: INTERNAL MEDICINE

## 2023-10-13 PROCEDURE — 120N000001 HC R&B MED SURG/OB

## 2023-10-13 PROCEDURE — 85520 HEPARIN ASSAY: CPT | Performed by: INTERNAL MEDICINE

## 2023-10-13 PROCEDURE — 85610 PROTHROMBIN TIME: CPT | Performed by: STUDENT IN AN ORGANIZED HEALTH CARE EDUCATION/TRAINING PROGRAM

## 2023-10-13 PROCEDURE — G0463 HOSPITAL OUTPT CLINIC VISIT: HCPCS | Mod: 25

## 2023-10-13 PROCEDURE — 82565 ASSAY OF CREATININE: CPT | Performed by: HOSPITALIST

## 2023-10-13 RX ADMIN — HYDROMORPHONE HYDROCHLORIDE 4 MG: 2 TABLET ORAL at 13:51

## 2023-10-13 RX ADMIN — HYDROMORPHONE HYDROCHLORIDE 0.5 MG: 1 INJECTION, SOLUTION INTRAMUSCULAR; INTRAVENOUS; SUBCUTANEOUS at 14:55

## 2023-10-13 RX ADMIN — METHADONE HYDROCHLORIDE 5 MG: 5 TABLET ORAL at 05:44

## 2023-10-13 RX ADMIN — Medication 1 PACKET: at 08:36

## 2023-10-13 RX ADMIN — SENNOSIDES AND DOCUSATE SODIUM 2 TABLET: 50; 8.6 TABLET ORAL at 08:36

## 2023-10-13 RX ADMIN — METHADONE HYDROCHLORIDE 5 MG: 5 TABLET ORAL at 20:26

## 2023-10-13 RX ADMIN — MICONAZOLE NITRATE: 2 POWDER TOPICAL at 20:32

## 2023-10-13 RX ADMIN — ATORVASTATIN CALCIUM 40 MG: 40 TABLET, FILM COATED ORAL at 20:26

## 2023-10-13 RX ADMIN — METHADONE HYDROCHLORIDE 5 MG: 5 TABLET ORAL at 11:00

## 2023-10-13 RX ADMIN — SENNOSIDES AND DOCUSATE SODIUM 2 TABLET: 50; 8.6 TABLET ORAL at 20:27

## 2023-10-13 RX ADMIN — INSULIN ASPART 1 UNITS: 100 INJECTION, SOLUTION INTRAVENOUS; SUBCUTANEOUS at 13:49

## 2023-10-13 RX ADMIN — INSULIN ASPART 1 UNITS: 100 INJECTION, SOLUTION INTRAVENOUS; SUBCUTANEOUS at 17:32

## 2023-10-13 RX ADMIN — MICONAZOLE NITRATE: 2 POWDER TOPICAL at 08:36

## 2023-10-13 RX ADMIN — HEPARIN SODIUM 1250 UNITS/HR: 10000 INJECTION, SOLUTION INTRAVENOUS at 06:28

## 2023-10-13 RX ADMIN — MULTIPLE VITAMINS W/ MINERALS TAB 1 TABLET: TAB at 08:36

## 2023-10-13 RX ADMIN — INSULIN DEGLUDEC 10 UNITS: 100 INJECTION, SOLUTION SUBCUTANEOUS at 11:00

## 2023-10-13 RX ADMIN — METHADONE HYDROCHLORIDE 10 MG: 10 TABLET ORAL at 16:17

## 2023-10-13 RX ADMIN — ASPIRIN 81 MG: 81 TABLET, COATED ORAL at 08:36

## 2023-10-13 ASSESSMENT — ACTIVITIES OF DAILY LIVING (ADL)
ADLS_ACUITY_SCORE: 35

## 2023-10-13 NOTE — PROGRESS NOTES
"VASCULAR SURGERY PROGRESS NOTE    Subjective:  No new issues overnight.  Pain appears to be well controlled.  Vac working well.         Objective:  Intake/Output Summary (Last 24 hours) at 10/13/2023 0730  Last data filed at 10/13/2023 0029  Gross per 24 hour   Intake 0 ml   Output 700 ml   Net -700 ml     PHYSICAL EXAM:  /65 (BP Location: Left arm, Patient Position: Sitting, Cuff Size: Adult Large)   Pulse 73   Temp 98.4  F (36.9  C) (Oral)   Resp 20   Ht 1.676 m (5' 6\")   Wt 115.1 kg (253 lb 12 oz)   SpO2 94%   BMI 40.96 kg/m    Resting in bed, awake, alert, appropriate  Wound VAC to the left below-knee amputation stump  HGB stable      ASSESSMENT:  Postop day 6 status post left saritha CRUZ        PLAN:  -OR tentatively now on Saturday, 10/14 for fascia closure of stump  -continue heparin drip for atrial fibrillation   -appreciate all teams involved    Vanita Carter NP  VASCULAR SURGERY                   "

## 2023-10-13 NOTE — CONSULTS
Triage and Transition - Consult and Liaison     Linda Loredo  October 13, 2023    Session start: 1105  Session end: 1125   Session duration in minutes: 20 minutes   CPT utilized: 23069 - Brief diagnostic assessment (modifier 52)  Patient was seen virtually ([employer] cart or other teleconferencing device).    Diagnosis:   Adjustment Disorders  309.24 (F43.22) With anxiety,  present ;    Plan/Recommendations:   Continue care coordination with care team.   Maintain current transition plan.   Does not wish for any pharmacological intervention or non-pharmacological interventions.    Repots having moments of feeling down but does endorse symptoms of anxiety or depression.   Please enter another psychiatry if further visits are needed. Patients are not followed by Psychiatry C&L Service unless otherwise indicated.     Reason for consult: Psychiatry consult was requested due to concern for depression and anxiety after BKA. Patient was seen by Triage and Transition Consult & Liaison team.     Identifying information: Linda Loredo is a 78 year old female who presents as a transfer from [facility] with diabetic foot ulcer.  Patient reports she has had diabetes since 2001.  She  also has neuropathy and has been dealing with leg pain.  She also notes she has edema and has been dealing with lower extremity ulcers.  She apparently had please call for welfare check and was brought to the hospital.  On arrival she had somnolence and weakness in her legs as well as pain.  Found to have a left heel decubitus/foot ulcer.  Podiatry evaluated and debrided the wound.  She had ABIs due to some concern for vascular disease so she was transferred to [facility].  Her wound was polymicrobial and she has been started on vancomycin and imipenem.     Brief Psychosocial History   Prior to admission Ms. Awa Loredo was living independently.  Unsure of the condition of her home and noted this is concern  assumed by her care team. Family is hoping that Ms. Awa Loredo will to go to an [facility].  Her family and friends do not believe she is safe at home.  She is planning to discharge to a TCU level of care due to need for IV antibiotics.       Summary of Patient Situation  She has reported to spiritual care that yesterday she had a difficulty day and is frustrated.  She notes increased emotions while hospitalized and that she is able to acknowledge and express gratitude for the support she has available to her both formally and informally. She is focused this morning on her wound vac and surgical procedure for tomorrow.  She is confused at Writers presence today and that she is feels this Writer was only present because she was asking questions about her wound vac and requesting to speak to the doctor. Writer reassured her she was not in trouble and that the consult was placed yesterday and that our service is consulted by the care team to offer additional support if needed and they have noted she has been through a lot recently. She denies the need for any mental health support or services. DaughterJulieta, was on the phone and is in agreement that she does not need additional mental health care or supportive services at this time.      Significant Clinical History  Noted within her medical history, a history of depressive disorder NOS.     Current Providers  Primary Care Provider:   Referring Physician: ELSA WARNER Referring Address: [address] [place] [address]   Referring Phone: [phone] Referring Fax: [phone]   Primary Physician: Ish Brown Primary Address: [address]*   Primary Phone: [phone] Primary Fax: [phone]     Psychiatrist: No   Therapist: No   : No   ARMHS: No   ACT Team: No   Other: No    Collateral information:   Reviewed chart and coordinated with with daughterJulieta whom was on the phone.      Mental  Status Exam   Affect: Appropriate  Appearance: Appropriate   Attention Span/Concentration: Attentive    Eye Contact: Engaged  Fund of Knowledge: Appropriate   Language /Speech Content: Fluent  Language /Speech Volume: Normal   Language /Speech Rate/Productions: Normal   Recent Memory: Intact  Remote Memory: Intact  Mood: Depressed   Orientation:   Person: Yes   Place: Yes  Time of Day: Yes   Date: Yes   Situation (Do they understand why they are here?): Yes   Psychomotor Behavior: Normal   Thought Content: Clear  Thought Form: Intact    Current medications:   Current Facility-Administered Medications   Medication    acetaminophen (TYLENOL) tablet 650 mg    aspirin EC tablet 81 mg    atorvastatin (LIPITOR) tablet 40 mg    bisacodyl (DULCOLAX) suppository 10 mg    glucose gel 15-30 g    Or    dextrose 50 % injection 25-50 mL    Or    glucagon injection 1 mg    heparin infusion 25,000 units in D5W 250 mL ANTICOAGULANT    HYDROmorphone (DILAUDID) tablet 4 mg    HYDROmorphone (PF) (DILAUDID) injection 0.3 mg    Or    HYDROmorphone (PF) (DILAUDID) injection 0.5 mg    insulin aspart (NovoLOG) injection (RAPID ACTING)    insulin aspart (NovoLOG) injection (RAPID ACTING)    insulin degludec (TRESIBA) 100 UNIT/ML injection 10 Units    [Held by provider] insulin degludec (TRESIBA) 100 UNIT/ML injection 15 Units    Dave PACK 1 packet    lidocaine 1 % 0.1-1 mL    magnesium hydroxide (MILK OF MAGNESIA) suspension 30 mL    melatonin tablet 1 mg    methadone (DOLOPHINE) tablet 10 mg    methadone (DOLOPHINE) tablet 5 mg    miconazole (MICATIN) 2 % powder    multivitamin w/minerals (THERA-VIT-M) tablet 1 tablet    naloxone (NARCAN) injection 0.2 mg    Or    naloxone (NARCAN) injection 0.4 mg    Or    naloxone (NARCAN) injection 0.2 mg    Or    naloxone (NARCAN) injection 0.4 mg    ondansetron (ZOFRAN ODT) ODT tab 4 mg    Or    ondansetron (ZOFRAN) injection 4 mg    Patient is already receiving anticoagulation with heparin,  enoxaparin (LOVENOX), warfarin (COUMADIN)  or other anticoagulant medication    polyethylene glycol (MIRALAX) Packet 17 g    senna-docusate (SENOKOT-S/PERICOLACE) 8.6-50 MG per tablet 1 tablet    Or    senna-docusate (SENOKOT-S/PERICOLACE) 8.6-50 MG per tablet 2 tablet    senna-docusate (SENOKOT-S/PERICOLACE) 8.6-50 MG per tablet 2 tablet    sodium chloride (PF) 0.9% PF flush 10-20 mL    sodium chloride (PF) 0.9% PF flush 10-40 mL    sodium chloride (PF) 0.9% PF flush 10-40 mL        Therapeutic intervention and progress:  Therapeutic intervention consisted of building therapeutic rapport, active listening, and validation. Patient is making progress towards treatment goals as evidenced by participation in conversation to the best of her ability.     KENNETH NICE   Triage and Transition - Consult and Liaison   [phone]

## 2023-10-13 NOTE — PROVIDER NOTIFICATION
MD Notification    Notified Person: MD    Notified Person Name: LOGAN CarterAnastasiia MONTOYA    Notification Date/Time: 12:25, 10/13/23    Notification Interaction: Page    Purpose of Notification: Pt is wondering is WOC needs to see her if surgery is planned 10/14. She states the wound cares are very painful. Would like to speak to you in person.     Orders Received:    Comments:

## 2023-10-13 NOTE — PLAN OF CARE
Goal Outcome Evaluation:      Summary:  POD#5 LEFT GUILLOTINE BELOW KNEE AMPUTATION.  Hx of DM2. A-fib, HTN, HLD, CKD III, Obesity, BER[place]  Date & Time: 10/12/23  1903-2510   Orientation: A&O X4    Activity Level: A 2 Lift T&R if allowed    Fall Risk: No   Behavior & Aggression: Green  but Agitated at times, frustrated with staff, frequent repeated requests to staff.   Pain Management:  Scheduled Methadone   ABNL VS/O2:  VS RA   Tele: N/A   ABNL Lab/BG: Hgb 9.3, hematocrit 29.6, INR 1.18   Diet: Mod carb but NPO at midnight    Bowel/Bladder: Purewick per demand   Skin:   2+ dependent edema, baseline neuropathy to all extremities, LLE dry & rigo with 1+ pulse. LLE heel & calf dressings CDI.  Drains/Devices: PICC line infusing heparin at 1250 unit/hr, wound Vac to Left wound setting 125 mmHg  Tests/Procedures: Another surgery tomorrow    Anticipated DC Date: TBD   Other Important Info: NPO  at midnight

## 2023-10-13 NOTE — PLAN OF CARE
Goal Outcome Evaluation:      Plan of Care Reviewed With: patient    Overall Patient Progress: no changeOverall Patient Progress: no change       Date & Time: October 13, 2023 1567-6277   Surgery/POD#:  POD 6 LEFT GUILLOTINE BELOW KNEE AMPUTATION.   Behavior & Aggression: Green, fluctuating mood.   Fall Risk: Yes   Orientation:A&Ox4   ABNL VS/O2:VSS on RA   ABNL Labs: BS, see chart  Pain Management:Denies pain, scheduled meds  Bowel/Bladder: Purewick in place, BS active, no BM throughout shift.   Drains: Purewick and wound vac  Diet: NPO at midnight   Activity Level: 2 Assist, lift. Denying repo, will only shift weight and allow boosts.   Tests/Procedures: Surgery today  Anticipated  DC Date: Pending   Significant Information: Surgery today at sometime, not scheduled as of yet. PICC infusing. Wound vac in place. Perineum and coccyx, redness and breakdown noted. LLE heel and calf wound, WDL.

## 2023-10-13 NOTE — PROGRESS NOTES
"[facility] Nurse Inpatient Assessment     Consulted for: 9/30 Wound diabetic foot ulcer left foot (now s/p BKA and wound vac placement); 10/2 Wound right buttocks.     Summary:   1) Left heel ulcer: s/p BKA 10/7, vac in place, changed by [facility] 10/13, plan for return to OR 10/14. Blister noted to posterior leg near surgical site    The following not assesed on 10/13, patient declined after vac change.   2) BL legs: Chronic venous stasis with superficial healing wounds   3) Buttock/Coccyx: Mixture of friction and moisture and pressure, very painful and entire perineal area/ groin very macerated 10/11, bariatric low air loss mattress ordered   4) Left IT: Unstageable ulcer present on admission, stable 10/11      Patient History (according to provider note(s):      \"78 year old female admitted on 9/29/2023. She presents as a transfer from [facility] for L heel osteomyelitis \"    Assessment:      Areas visualized during today's visit: Heels , Sacrum/coccyx, and Lower extremities left     Wound location: Left BKA   Last photo: 10/13/23            Wound due to: Pressure Injury and Diabetic Ulcer, now surgical wound/amp  Wound history/plan of care: s/p BKAsaritha 10/7  Wound base: 30% bone, 20 % granulation tissue 50% muscle, adipose. Lateral/Posterior aspect of wound with large blood blister     Palpation of the wound bed: firm and fluctuance-     Drainage: moderate     Description of drainage: serosanguinous and bloody     Measurements (length x width x depth, in cm): 18  x 16  x 2cm to center , blister measures 3cm x 6cm     Tunneling: N/A     Undermining: N/A  Periwound skin: Blister(s), Dry/scaly, Edematous, and Macerated, maroon discoloration      Color:  scattered micro bleeding under tissues, steady trickle at 6 o'clock, pressure held, bleeding minimized to droplets.         Temperature: normal   Odor: none  Pain: severe, Irritable or crying out at intervals, tension to hands, feet " and body, facial expression of distress, and during dressing change, intermittent, stabbing, and sharp  Pain interventions prior to dressing change: oral dilaudid, IV dilaudid, slow and gentle cares , and distraction  Treatment goal: Drainage control, Infection control/prevention, Maintain (prevention of deterioration), Protection, and Prepare wound bed for flap/graft  STATUS: stable  Supplies ordered: at bedside and discussed with patient     Negative pressure wound therapy applied to: Left BKA   Last photo: 10/10, see above   Wound due to: Surgical Wound   Wound history/plan of care:    Surgical date: 10/7/23   Service following: Vascular  Date Negative Pressure Wound Therapy initiated: 10/7/23   Interventions in place: repositioning, pressure redistribution with device or dressing, specialty surface in use, moisture/incontinence management, offloading, and elevation  Is patient s nutritional status compromised? no   If yes, what interventions are in place? N/A  Reason for initiating vac therapy? Presence of co-morbidities and Need for accelerated granulation tissue  Which?of?the?following?co-morbidities?apply? Diabetes, Immobility, Obesity, and Depression  If diabetic is patient on a diabetic management program? Yes   Is osteomyelitis present in wound? no   If yes what treatments are in place? N/A       Volume in cannister: 750ml     Last cannister change date: 10/13     Description of drainage: serosanguinous and bloody        Supplies ordered: gathered and discussed with patient , medium black granufoam, cavillon skin prep    Number of foam pieces removed from a wound (excluding foam for bridge) :  1 GranuFoam Black and 1 Oil emulsion dressing   Verified this matched the number of foam pieces applied last dressing change: Yes   Number of foam pieces packed into wound (excluding foam for bridge) :  2 GranuFoam Black and 1 Oil emulsion dressing      The following wounds assessed on 10/11    Wound location: Right  posterior lower leg    Last photo: 10/11/23      Wound due to: Venous Ulcer  Wound history/plan of care: unclear  Chronic non healing ulcer, chronic edema with diabetes    Wound base: drying flaking white-yellow-pink tissue and serous scabbing     Palpation of the wound bed: normal      Drainage: scant     Description of drainage: serosanguinous     Measurements (length x width x depth, in cm): 1.5  x 2  x  0.1 cm      Tunneling: N/A     Undermining: N/A  Periwound skin: Intact, Edematous, and Erythema- blanchable      Color: pink      Temperature: normal   Odor: none  Pain: denies , none  Pain interventions prior to dressing change: N/A  Treatment goal: Heal  and Infection control/prevention  STATUS: improving  Supplies ordered: at bedside, supplies stored on unit, and discussed with RN      Wound location: Left lateral LE  Last photo: 10/6/23        Wound due to: Venous Ulcer  Wound history/plan of care: Chronic non healing ulcer, chronic edema with diabetes  Wound base: pink, flaking epidermis     Palpation of the wound bed: normal      Drainage: scant     Description of drainage: serosanguinous     Measurements (length x width x depth, in cm): 2  x 0.8  x  0.1 cm (not measured 10/11)     Tunneling: N/A     Undermining: N/A  Periwound skin: Dry/scaly, Edematous       Color: normal and consistent with surrounding tissue      Temperature: normal   Odor: none  Pain: denies , none  Pain interventions prior to dressing change: N/A  Treatment goal: Heal   STATUS: resurfaced  Supplies ordered: at bedside      Wound location: Coccyx/buttock  Last photo: 10-11-23      Wound due to: Friction and Incontinence Associated Dermatitis (IAD)  Wound history/plan of care: Patient with wounds on admission. Pt reports that her buttocks and sacrum are painful. Found with Mepilex Sacral in place 10/11, was painful to remove and skin very macerated underneath, pt requesting to not reapply this type of dressing today   Wound base:  patchy areas of moist red tissue, evidence of chronic tissue injury to bilateral buttocks and ITs     Palpation of the wound bed: normal      Drainage: moderate,  serosanguinous and bloody     Description of drainage: serosanguinous and bloody     Measurements (length x width x depth, in cm):  10 x 6 x 0.1 cm (total area spanning buttocks)     Tunneling: N/A     Undermining: N/A  Periwound skin: Dry/scaly and Erythema- blanchable, flaking peeling epidermis, maceration especially in gluteal cleft and perineal folds; mild red denudement to inner labia under purewick      Color: dusky and purple      Temperature: normal   Odor: none  Pain: mild, tender  Pain interventions prior to dressing change: slow and gentle cares   Treatment goal: Heal , Drainage control, Decrease moisture, and Protection  STATUS: improving slowly  Supplies ordered: at bedside, supplies stored on unit, discussed with RN, and discussed with patient     Wound location: Left IT    Last photo: 10-11-23      Wound due to: Mixed Etiology: moisture, friction, pressure  Wound history/plan of care: Patient refuses to have HOB lowered. Wound covered with Triad. Patient has three incontinence pads under her as well as Purewick in place.    Wound base: Yellow /pink/ red marbled tissue, yellow tissue is adherent, fibrinous     Palpation of the wound bed: normal      Drainage: scant     Description of drainage: serosanguinous     Measurements (length x width x depth, in cm): 1.2  x 2  x  0.2 cm      Tunneling: N/A     Undermining: N/A  Periwound skin: Dry/scaly and Erythema- blanchable      Color: dusky and pink      Temperature: normal   Odor: none  Pain: mild, stinging  Pain interventions prior to dressing change: slow and gentle cares   Treatment goal: Heal , Drainage control, Decrease moisture, and Protection  STATUS: stable  Supplies ordered: at bedside, supplies stored on unit, and discussed with RN        Treatment Plan:     Bilateral lower legs: every  3 days and prn:  Cleanse any open areas with wound cleanser.  Cover with Mepilex.   Mepilex to right heel for protection prn.   Elevate legs; float right heel, cushion left BKA.     Buttock/ left IT/ perineal cares: BID and prn:  Cleanse with generous amount Marisol perineal lotion and soft dry cloths.  Spray the Marisol directly on the skin.  Ensure cleansing to base of all folds.  Do not need to remove all old paste every time, only outer soiled layers.   Apply antifungal powder to all skin folds.  Spread the powder with a dry cloth - this helps get a thin, even layer.    Apply Triad paste to buttock wounds and left IT wound, and any other open areas.    Leave open to air or apply Mepilex LITE over the buttock wounds as needed, to help keep the paste and wounds from sticking to the chux.     Place a flat layer of dry cloths or InterDry along anterior groin and pannus folds as needed to help prevent skin on skin chafing.     Trial using the PureWick only when needs to void.   PIP measures.  Bariatric low air loss mattress.      Negative pressure wound therapy plan:  Wound location: Left BKA   Change Days: Tues/ Fri by C RN    Supplies (including all accessories) used: medium Black foam , Adaptic/Curity oil emulsion contact layer , and Cavilon no sting barrier film  Cleanse with Normal saline and MicroKlenz prior to replacing VAC    Suction setting: -125   Methods used: Window paned all periwound skin with vac drape prior to applying sponge    Staff RN to assess integrity of dressing and ensure suction is set at appropriate level every shift.   Date canister. Chart canister output every shift. Change cannister weekly and PRN if full/occluded     Remove foam dressing and replace with BID normal saline moist gauze dressing if:   -a dressing failure which cannot be repaired within 2 hours   -patient is discharging to home without a home pump   -patient is discharging to a facility outside the local area   -if a dressing is  "a \"Silver Foam\", remove before Radiation Therapy or MRI     The hospital VAC pump is not to be discharged with the patient.?Ensure to disconnect patient from machine prior to discharge. Then,    - If a home KCI VAC pump has been delivered, connect home cannister to dressing tubing then connect cannister to home pump and turn on machine    - If transferring to a nearby facility with a KCI vac, can disconnect and clamp tubing then cover end with glove so can be reconnected within 2 hours       Pressure Injury Prevention (PIP) Plan:  -If patient is declining pressure injury prevention interventions: Explore reason why and address patient's concerns, Educate on pressure injury risk and prevention intervention(s), If patient is still declining, document \"informed refusal\" , and Ensure Care team is aware ( provider, charge nurse, etc)  -Mattress: Follow bed algorithm, reassess daily and order specialty mattress, if indicated.  -HOB: Maintain at or below 30 degrees, unless contraindicated  -Repositioning in bed: Every 1-2 hours , Left/right positioning; avoid supine, and Raise foot of bed prior to raising head of bed, to reduce patient sliding down (shear)  -Heels: Keep elevated off mattress  -Protective Dressing:  Mepilex to R heel  -Positioning Equipment:  pillows  -Chair positioning: Chair cushion (#[id number]) , Repositions self: patient to shift weight every 15 minutes, and Do NOT use a donut for sitting (this increases pressure to smaller area and creates a higher potential for injury)    -Moisture Management: Perineal cleansing /protection: Follow Incontinence Protocol, Avoid brief in bed, Clean and dry skin folds with bathing , and Moisturize dry skin  -Under Devices: Inspect skin under all medical devices during skin inspection , Ensure tubes are stabilized without tension, and Ensure patient is not lying on medical devices or equipment when repositioned     Orders: Reviewed, Written, and Updated    RECOMMEND PRIMARY " TEAM ORDER: None, at this time  Education provided: plan of care, wound progress, Moisture management, Hygiene, and Off-loading pressure  Discussed plan of care with: Patient and Nurse   WO nurse follow-up plan: Tuesday/Friday for NPWT dressing changes and wound follow up  Notify WO if wound(s) deteriorate.  Nursing to notify the Provider(s) and re-consult the WO Nurse if new skin concern.    DATA:     Current support surface: Standard  Low air loss (BRISA pump, Isolibrium, Pulsate, skin guard, etc)  Containment of urine/stool: Incontinence Protocol, Incontinent pad in bed, and Purewick external catheter   BMI: Body mass index is 40.96 kg/m .   Active diet order: Orders Placed This Encounter      Consistent Carbohydrate Diet Moderate Consistent Carb (60 g CHO per Meal) Diet      NPO per Anesthesia Guidelines for Procedure/Surgery Except for: Meds     Output: No intake/output data recorded.     Labs:   Recent Labs   Lab 10/13/23  0545 10/12/23  0813   HGB 8.4* 9.3*   INR 1.15 1.18*   WBC  --  6.8     Pressure injury risk assessment:   Sensory Perception: 3-->slightly limited  Moisture: 3-->occasionally moist  Activity: 1-->bedfast  Mobility: 2-->very limited  Nutrition: 3-->adequate  Friction and Shear: 1-->problem  Suresh Score: 13    Rae Romero RN CWOCN  -Securely message with [employer] ([facility] [employer])   -[facility] Office Phone: [phone] (messages checked periodically Mon-Fri 8a-4p)

## 2023-10-13 NOTE — PROVIDER NOTIFICATION
MD Notification    Notified Person: MD    Notified Person Name: LOGAN Carter JAN    Notification Date/Time: 0948, 10/13/23    Notification Interaction: Page    Purpose of Notification: Pt very tearful about having WOC see her today. She is wondering if she needs to have her dressing changed if she has surgery 10/14. Would also like to speak to you again.    Orders Received:    Comments:

## 2023-10-13 NOTE — PROGRESS NOTES
[facility]    HOSPITALIST PROGRESS NOTE :   --------------------------------------------------    Date of Admission:  9/29/2023    Cumulative Summary: Linda Loredo is a 78 year old female admitted on 9/29/2023. She was transferred from [facility] for L heel osteomyelitis.  She underwent BKA on 10/7/23.  Plan to return to OR mid-week.     Assessment & Plan     L foot decubitus ulcer status debridement by podiatry on 9/26 with multiple organisms, L heel osteomyelitis/cellulitis  Chronic venous stasis  MRSA positive hx  S/P BKA on 10/7/23  Acute blood loss anemia  Brought in 9/23 after welfare check by police found to have L leg swelling and erythema.  Hx venous stasis ulcers. Presents with L heel decubitus ulcer with osteo. Wound with multiple organisms incl pseudomonas, morganella, e faecalis, bordetella, proteus status underwent debridement 9/26 by podiatry.   Blood cultures done on 9/25/2023 have been negative.  ALMA w/ poor flow, podiatry at outside hospital requested transfer where there are vascular services.  Patient is currently on vancomycin and meropenem as per prior recommendations and status post PICC line placement on 9/27 and infectious disease, podiatry and vascular surgery team assisting.  After considering options she decided to proceed with amputation.   * 10/7/23 Left guillotine below knee amputation  *10/10 1u PRBCs given for hgb 6.6    -- Patient was seen and examined , slightly frustrated about not getting her surgery today  -- As above, patient underwent left below-knee amputation on October/7/23.  --Pain medications were adjusted as wound VAC change on October 10 was very painful  -- Patient requested to be made aware been she is planned to undergo her next wound VAC change so she can get as needed medication half an hour before.  -- Plan for completion of left BKA on October 14th now   -- Will appreciate vascular surgery placing orders for holding heparin  for procedure, no recommendations are currently present in the note.  -- Patient is also requesting to be seen by vascular surgery , according to patient she hasnt talk to vascular surgery about her procedure on 10/12 or 10/13   -- Informed charge nurse if vascular surgery can come and see the patient   -- Patient also have questions regarding her wound vac if it needs to be changed today or can she wait till tomorrow   -- Bedside nursing will reach out to vascular surgery   -- Remained on IV meropenem and IV vanco until 24 hours post-amputation per ID recommendation (last dosed 10/8 evening)  --  Post-op site cares, anticoagulation, activity restrictions, pain meds per surgical team.   --  Remains on heparin drip, no plan to move to oral (warfarin) until after all surgeries completed  --  Monitor hgb, transfuse if <7  --  planning TCU on discharge  -- Make patient n.p.o. after midnight, will adjust insulin, see below.  -- Psych consultation is requested per Staff request, due to patient being very frustrated , concern for underlying depression and coping in the picture of BKA     Acute on chronic pain syndrome:   -- Continue methadone 10 mg daily midday and 5 mg TID  -- IV and PO dilaudid prn for breakthrough pain (doses increased 10/10)     Urine culture positive for E faecalis, Staph aureus and E. Coli  -- UA done at outside hospital on 9/23 showed above, she completed >10 day course of antibiotics with vancomycin, ceftriaxone, meropenem on 10/8     DM II:  [PTA degludec 35 units at HS, metformin 2000 mg daily, ozempic]  A1c 7.0 9/23/23. Has been on insulin pump in the past but this was changed to tresiba 8/2023.     -- hold metformin, Ozempic   -- medium resistance SSI  -- Patient has been receiving Tresiba 15 units at bedtime,did not receive last night in anticipation of surgery this morning    -- Patient is now planned to undergo surgery on 10/14 , as patient did not receive her 15 units of Tresiba last night  "in anticipation for surgery today, will give patient 10 units of Tresiba X 1  -- Plan to continue to hold bedtime tresiba tonight and restart it after the surgery tomorrow      Atrial fibrillation  HTN  HLD:  [PTA- warfarin, simvastatin 20 mg daily, lisinopril 5 mg daily]    -- Continue with statin, warfarin currently is on hold  -- Continue heparin gtt, start warfarin once all surgeries completed     CKD III:  --Baseline creatinine 1-1.2.   --Stable, monitor occasionally     Failure to thrive:  As above, called by police for welfare check. ? Safety at home. SW had seen at [facility].     --Social work team has been consulted, tentative plan is possible discharge to TCU depending on surgical recovery.  --According to patient, currently patient lives in independent living and is usually able to take care of activities of daily living.  Daughter and son-in-law live very close at 1 mile distance from her home.  Daughter is helpful in bringing her groceries and cooked meal from her home.     Obesity:  -- Complicates care     BERLIN:  -- continue home cpap    Clinically Significant Risk Factors           # Hypercalcemia: Highest Ca = 10.2 mg/dL in last 2 days, will monitor as appropriate        # Hypertension: Noted on problem list       # DMII: A1C = 7.0 % (Ref range: <5.7 %) within past 6 months   # Severe Obesity: Estimated body mass index is 40.96 kg/m  as calculated from the following:    Height as of this encounter: 1.676 m (5' 6\").    Weight as of this encounter: 115.1 kg (253 lb 12 oz).             Diet: Snacks/Supplements Adult: Glucerna; Between Meals  Consistent Carbohydrate Diet Moderate Consistent Carb (60 g CHO per Meal) Diet  NPO per Anesthesia Guidelines for Procedure/Surgery Except for: Meds    Braga Catheter: Not present  DVT Prophylaxis: Heparin GTT  Code Status: Full Code    The patient's care was discussed with the Patient.    Medical Decision Making     55 MINUTES SPENT BY ME on the date of service doing " chart review, history, exam, documentation & further activities per the note.        Disposition Plan   Might need rehab after she is done with the surgeries.     Expected Discharge Date: 10/16/2023    Discharge Delays: Procedure Pending (enter procedure & time in comments)  Destination: inpatient rehabilitation facility  Discharge Comments: amputation closure this week.-- now moved to Saturday 10/14     Entered: Divya Song MD 10/13/2023, 8:30 AM       Div[facility] Song MD, [facility], [facility]  Text Page (7am - 6pm)    ----------------------------------------------------------------------------------------------------------------------      Interval History     Patient was seen and examined this morning, frustrated as her surgery has been delayed and it has not been communicated to her by vascular surgery.  Discussed with patient regarding giving her Tresiba 10 units this morning.  Patient also had questions regarding if she can skip wound VAC change today if she is planned to undergo surgery tomorrow.,  Discussed with bedside nursing regarding paging vascular surgery regarding Heparin infusion     -Data reviewed today: I reviewed all new labs and imaging results over the last 24 hours.    I personally reviewed no images or EKG's today.    Physical Exam   Temp: 98  F (36.7  C) Temp src: Oral BP: 102/56 Pulse: 71   Resp: 17 SpO2: 91 % O2 Device: None (Room air)    Vitals:    10/08/23 0624 10/09/23 0419 10/10/23 0530   Weight: 114.3 kg (251 lb 15.8 oz) 123.1 kg (271 lb 6.2 oz) 115.1 kg (253 lb 12 oz)     Vital Signs with Ranges  Temp:  [97.7  F (36.5  C)-99  F (37.2  C)] 98  F (36.7  C)  Pulse:  [71-83] 71  Resp:  [16-20] 17  BP: (102-126)/(55-70) 102/56  SpO2:  [91 %-94 %] 91 %  I/O last 3 completed shifts:  In: 0   Out: 700 [Urine:700]    GENERAL: Alert , awake and oriented. NAD. Conversational, appropriate.   HEENT: Normocephalic. EOMI. No icterus or injection. Nares normal.   LUNGS: Clear to auscultation. No dyspnea at rest.    HEART: Regular rate. Extremities perfused.   ABDOMEN: Soft, nontender, and nondistended. Positive bowel sounds.   EXTREMITIES: Left lower extremity with amputation, wound VAC in place with sanguinous drainage in tubing, right upper extremity PICC line in place  NEUROLOGIC: Moves extremities x4 on command. No acute focal neurologic abnormalities noted.     Medications    heparin 1,250 Units/hr (10/13/23 0628)    - MEDICATION INSTRUCTIONS -        aspirin  81 mg Oral Daily    atorvastatin  40 mg Oral QPM    insulin aspart  1-7 Units Subcutaneous TID AC    insulin aspart  1-5 Units Subcutaneous At Bedtime    insulin degludec  10 Units Subcutaneous Once    [Held by provider] insulin degludec  15 Units Subcutaneous At Bedtime    Dave  1 each Oral BID 09 12    methadone  10 mg Oral Daily    methadone  5 mg Oral TID    miconazole   Topical BID    multivitamin w/minerals  1 tablet Oral Daily    senna-docusate  2 tablet Oral BID    sodium chloride (PF)  10-40 mL Intracatheter Q8H       Data   Recent Labs   Lab 10/13/23  0821 10/13/23  0553 10/13/23  0545 10/12/23  1203 10/12/23  0813 10/11/23  0822 10/11/23  0616 10/10/23  1156 10/10/23  0613   WBC  --   --   --   --  6.8  --  5.9  --  6.2   HGB  --   --  8.4*  --  9.3*  --  8.1*   < > 6.6*   MCV  --   --   --   --  95  --  95  --  97   PLT  --   --   --   --  190  --  187  --  201   INR  --   --  1.15  --  1.18*  --  1.24*  --  1.22*   NA  --   --  142  --   --   --  138  --  139   POTASSIUM  --   --  4.0  --   --   --  4.3  --  4.5   CHLORIDE  --   --  104  --   --   --  103  --  103   CO2  --   --  30*  --   --   --  29  --  28   BUN  --   --  17.8  --   --   --  15.4  --  15.3   CR  --   --  1.01*  1.01*  --   --   --  0.93  --  0.92   ANIONGAP  --   --  8  --   --   --  6*  --  8   ZAKIA  --   --  10.2  --   --   --  10.1  --  10.1   * 129* 143*   < >  --    < > 121*   < > 121*    < > = values in this interval not displayed.       Imaging:   No results found  for this or any previous visit (from the past 24 hour(s)).

## 2023-10-14 ENCOUNTER — ANESTHESIA (OUTPATIENT)
Dept: SURGERY | Facility: CLINIC | Age: 78
DRG: 617 | End: 2023-10-14
Payer: COMMERCIAL

## 2023-10-14 ENCOUNTER — ANESTHESIA EVENT (OUTPATIENT)
Dept: SURGERY | Facility: CLINIC | Age: 78
DRG: 617 | End: 2023-10-14
Payer: COMMERCIAL

## 2023-10-14 LAB
ABO/RH(D): NORMAL
ANTIBODY SCREEN: NEGATIVE
GLUCOSE BLDC GLUCOMTR-MCNC: 101 MG/DL (ref 70–99)
GLUCOSE BLDC GLUCOMTR-MCNC: 128 MG/DL (ref 70–99)
GLUCOSE BLDC GLUCOMTR-MCNC: 132 MG/DL (ref 70–99)
GLUCOSE BLDC GLUCOMTR-MCNC: 142 MG/DL (ref 70–99)
GLUCOSE BLDC GLUCOMTR-MCNC: 92 MG/DL (ref 70–99)
GLUCOSE BLDC GLUCOMTR-MCNC: 95 MG/DL (ref 70–99)
HGB BLD-MCNC: 8 G/DL (ref 11.7–15.7)
INR PPP: 1.16 (ref 0.85–1.15)
SPECIMEN EXPIRATION DATE: NORMAL
UFH PPP CHRO-ACNC: 0.22 IU/ML

## 2023-10-14 PROCEDURE — 710N000009 HC RECOVERY PHASE 1, LEVEL 1, PER MIN: Performed by: SURGERY

## 2023-10-14 PROCEDURE — 250N000009 HC RX 250: Performed by: NURSE ANESTHETIST, CERTIFIED REGISTERED

## 2023-10-14 PROCEDURE — 250N000011 HC RX IP 250 OP 636: Performed by: SURGERY

## 2023-10-14 PROCEDURE — 258N000003 HC RX IP 258 OP 636: Performed by: NURSE ANESTHETIST, CERTIFIED REGISTERED

## 2023-10-14 PROCEDURE — 27886 AMPUTATION FOLLOW-UP SURGERY: CPT | Mod: 58 | Performed by: SURGERY

## 2023-10-14 PROCEDURE — 85520 HEPARIN ASSAY: CPT | Performed by: SURGERY

## 2023-10-14 PROCEDURE — 250N000011 HC RX IP 250 OP 636: Performed by: STUDENT IN AN ORGANIZED HEALTH CARE EDUCATION/TRAINING PROGRAM

## 2023-10-14 PROCEDURE — 250N000013 HC RX MED GY IP 250 OP 250 PS 637: Performed by: STUDENT IN AN ORGANIZED HEALTH CARE EDUCATION/TRAINING PROGRAM

## 2023-10-14 PROCEDURE — 86901 BLOOD TYPING SEROLOGIC RH(D): CPT | Performed by: SURGERY

## 2023-10-14 PROCEDURE — 85018 HEMOGLOBIN: CPT | Performed by: INTERNAL MEDICINE

## 2023-10-14 PROCEDURE — 0QBH0ZZ EXCISION OF LEFT TIBIA, OPEN APPROACH: ICD-10-PCS | Performed by: SURGERY

## 2023-10-14 PROCEDURE — 86850 RBC ANTIBODY SCREEN: CPT | Performed by: SURGERY

## 2023-10-14 PROCEDURE — 258N000003 HC RX IP 258 OP 636: Performed by: ANESTHESIOLOGY

## 2023-10-14 PROCEDURE — 250N000011 HC RX IP 250 OP 636: Performed by: ANESTHESIOLOGY

## 2023-10-14 PROCEDURE — 0QBK0ZZ EXCISION OF LEFT FIBULA, OPEN APPROACH: ICD-10-PCS | Performed by: SURGERY

## 2023-10-14 PROCEDURE — 120N000001 HC R&B MED SURG/OB

## 2023-10-14 PROCEDURE — 250N000011 HC RX IP 250 OP 636: Performed by: NURSE ANESTHETIST, CERTIFIED REGISTERED

## 2023-10-14 PROCEDURE — 0KBT0ZZ EXCISION OF LEFT LOWER LEG MUSCLE, OPEN APPROACH: ICD-10-PCS | Performed by: SURGERY

## 2023-10-14 PROCEDURE — 88300 SURGICAL PATH GROSS: CPT | Mod: TC | Performed by: SURGERY

## 2023-10-14 PROCEDURE — 272N000001 HC OR GENERAL SUPPLY STERILE: Performed by: SURGERY

## 2023-10-14 PROCEDURE — 370N000017 HC ANESTHESIA TECHNICAL FEE, PER MIN: Performed by: SURGERY

## 2023-10-14 PROCEDURE — 88300 SURGICAL PATH GROSS: CPT | Mod: 26 | Performed by: STUDENT IN AN ORGANIZED HEALTH CARE EDUCATION/TRAINING PROGRAM

## 2023-10-14 PROCEDURE — 99233 SBSQ HOSP IP/OBS HIGH 50: CPT | Mod: 25 | Performed by: INTERNAL MEDICINE

## 2023-10-14 PROCEDURE — 250N000011 HC RX IP 250 OP 636: Mod: JZ | Performed by: STUDENT IN AN ORGANIZED HEALTH CARE EDUCATION/TRAINING PROGRAM

## 2023-10-14 PROCEDURE — 250N000013 HC RX MED GY IP 250 OP 250 PS 637: Performed by: INTERNAL MEDICINE

## 2023-10-14 PROCEDURE — 85610 PROTHROMBIN TIME: CPT | Performed by: STUDENT IN AN ORGANIZED HEALTH CARE EDUCATION/TRAINING PROGRAM

## 2023-10-14 PROCEDURE — 999N000141 HC STATISTIC PRE-PROCEDURE NURSING ASSESSMENT: Performed by: SURGERY

## 2023-10-14 PROCEDURE — 360N000076 HC SURGERY LEVEL 3, PER MIN: Performed by: SURGERY

## 2023-10-14 PROCEDURE — 250N000009 HC RX 250: Performed by: ANESTHESIOLOGY

## 2023-10-14 PROCEDURE — 250N000009 HC RX 250: Performed by: SURGERY

## 2023-10-14 RX ORDER — CEFAZOLIN SODIUM/WATER 2 G/20 ML
2 SYRINGE (ML) INTRAVENOUS
Status: COMPLETED | OUTPATIENT
Start: 2023-10-14 | End: 2023-10-14

## 2023-10-14 RX ORDER — LIDOCAINE 40 MG/G
CREAM TOPICAL
Status: DISCONTINUED | OUTPATIENT
Start: 2023-10-14 | End: 2023-10-14 | Stop reason: HOSPADM

## 2023-10-14 RX ORDER — HYDROMORPHONE HCL IN WATER/PF 6 MG/30 ML
0.4 PATIENT CONTROLLED ANALGESIA SYRINGE INTRAVENOUS EVERY 5 MIN PRN
Status: DISCONTINUED | OUTPATIENT
Start: 2023-10-14 | End: 2023-10-14 | Stop reason: HOSPADM

## 2023-10-14 RX ORDER — FENTANYL CITRATE 0.05 MG/ML
50 INJECTION, SOLUTION INTRAMUSCULAR; INTRAVENOUS EVERY 5 MIN PRN
Status: DISCONTINUED | OUTPATIENT
Start: 2023-10-14 | End: 2023-10-14 | Stop reason: HOSPADM

## 2023-10-14 RX ORDER — FENTANYL CITRATE 50 UG/ML
INJECTION, SOLUTION INTRAMUSCULAR; INTRAVENOUS PRN
Status: DISCONTINUED | OUTPATIENT
Start: 2023-10-14 | End: 2023-10-14

## 2023-10-14 RX ORDER — ONDANSETRON 2 MG/ML
4 INJECTION INTRAMUSCULAR; INTRAVENOUS EVERY 30 MIN PRN
Status: DISCONTINUED | OUTPATIENT
Start: 2023-10-14 | End: 2023-10-14 | Stop reason: HOSPADM

## 2023-10-14 RX ORDER — FENTANYL CITRATE 0.05 MG/ML
50 INJECTION, SOLUTION INTRAMUSCULAR; INTRAVENOUS
Status: DISCONTINUED | OUTPATIENT
Start: 2023-10-14 | End: 2023-10-14 | Stop reason: HOSPADM

## 2023-10-14 RX ORDER — PROPOFOL 10 MG/ML
INJECTION, EMULSION INTRAVENOUS CONTINUOUS PRN
Status: DISCONTINUED | OUTPATIENT
Start: 2023-10-14 | End: 2023-10-14

## 2023-10-14 RX ORDER — ONDANSETRON 2 MG/ML
INJECTION INTRAMUSCULAR; INTRAVENOUS PRN
Status: DISCONTINUED | OUTPATIENT
Start: 2023-10-14 | End: 2023-10-14

## 2023-10-14 RX ORDER — CEFAZOLIN SODIUM/WATER 2 G/20 ML
2 SYRINGE (ML) INTRAVENOUS SEE ADMIN INSTRUCTIONS
Status: DISCONTINUED | OUTPATIENT
Start: 2023-10-14 | End: 2023-10-14 | Stop reason: HOSPADM

## 2023-10-14 RX ORDER — HYDROMORPHONE HCL IN WATER/PF 6 MG/30 ML
0.2 PATIENT CONTROLLED ANALGESIA SYRINGE INTRAVENOUS EVERY 5 MIN PRN
Status: DISCONTINUED | OUTPATIENT
Start: 2023-10-14 | End: 2023-10-14 | Stop reason: HOSPADM

## 2023-10-14 RX ORDER — MAGNESIUM HYDROXIDE 1200 MG/15ML
LIQUID ORAL PRN
Status: DISCONTINUED | OUTPATIENT
Start: 2023-10-14 | End: 2023-10-14 | Stop reason: HOSPADM

## 2023-10-14 RX ORDER — ONDANSETRON 4 MG/1
4 TABLET, ORALLY DISINTEGRATING ORAL EVERY 30 MIN PRN
Status: DISCONTINUED | OUTPATIENT
Start: 2023-10-14 | End: 2023-10-14 | Stop reason: HOSPADM

## 2023-10-14 RX ORDER — SODIUM CHLORIDE, SODIUM LACTATE, POTASSIUM CHLORIDE, CALCIUM CHLORIDE 600; 310; 30; 20 MG/100ML; MG/100ML; MG/100ML; MG/100ML
INJECTION, SOLUTION INTRAVENOUS CONTINUOUS
Status: DISCONTINUED | OUTPATIENT
Start: 2023-10-14 | End: 2023-10-14 | Stop reason: HOSPADM

## 2023-10-14 RX ORDER — PROPOFOL 10 MG/ML
INJECTION, EMULSION INTRAVENOUS PRN
Status: DISCONTINUED | OUTPATIENT
Start: 2023-10-14 | End: 2023-10-14

## 2023-10-14 RX ORDER — LIDOCAINE HYDROCHLORIDE 20 MG/ML
INJECTION, SOLUTION INFILTRATION; PERINEURAL PRN
Status: DISCONTINUED | OUTPATIENT
Start: 2023-10-14 | End: 2023-10-14

## 2023-10-14 RX ORDER — FENTANYL CITRATE 0.05 MG/ML
25 INJECTION, SOLUTION INTRAMUSCULAR; INTRAVENOUS EVERY 5 MIN PRN
Status: DISCONTINUED | OUTPATIENT
Start: 2023-10-14 | End: 2023-10-14 | Stop reason: HOSPADM

## 2023-10-14 RX ADMIN — METHADONE HYDROCHLORIDE 5 MG: 5 TABLET ORAL at 06:15

## 2023-10-14 RX ADMIN — ONDANSETRON 4 MG: 2 INJECTION INTRAMUSCULAR; INTRAVENOUS at 13:49

## 2023-10-14 RX ADMIN — PHENYLEPHRINE HYDROCHLORIDE 100 MCG: 10 INJECTION INTRAVENOUS at 13:24

## 2023-10-14 RX ADMIN — METHADONE HYDROCHLORIDE 5 MG: 5 TABLET ORAL at 19:51

## 2023-10-14 RX ADMIN — MICONAZOLE NITRATE: 2 POWDER TOPICAL at 09:14

## 2023-10-14 RX ADMIN — FENTANYL CITRATE 50 MCG: 50 INJECTION, SOLUTION INTRAMUSCULAR; INTRAVENOUS at 13:06

## 2023-10-14 RX ADMIN — MIDAZOLAM 1 MG: 1 INJECTION INTRAMUSCULAR; INTRAVENOUS at 12:37

## 2023-10-14 RX ADMIN — SODIUM CHLORIDE, POTASSIUM CHLORIDE, SODIUM LACTATE AND CALCIUM CHLORIDE: 600; 310; 30; 20 INJECTION, SOLUTION INTRAVENOUS at 13:03

## 2023-10-14 RX ADMIN — MULTIPLE VITAMINS W/ MINERALS TAB 1 TABLET: TAB at 09:14

## 2023-10-14 RX ADMIN — METHADONE HYDROCHLORIDE 5 MG: 5 TABLET ORAL at 10:58

## 2023-10-14 RX ADMIN — SENNOSIDES AND DOCUSATE SODIUM 2 TABLET: 50; 8.6 TABLET ORAL at 09:14

## 2023-10-14 RX ADMIN — ASPIRIN 81 MG: 81 TABLET, COATED ORAL at 09:14

## 2023-10-14 RX ADMIN — PROPOFOL 150 MCG/KG/MIN: 10 INJECTION, EMULSION INTRAVENOUS at 13:19

## 2023-10-14 RX ADMIN — FENTANYL CITRATE 50 MCG: 50 INJECTION, SOLUTION INTRAMUSCULAR; INTRAVENOUS at 14:19

## 2023-10-14 RX ADMIN — FENTANYL CITRATE 50 MCG: 50 INJECTION, SOLUTION INTRAMUSCULAR; INTRAVENOUS at 14:03

## 2023-10-14 RX ADMIN — HEPARIN SODIUM 1250 UNITS/HR: 10000 INJECTION, SOLUTION INTRAVENOUS at 01:54

## 2023-10-14 RX ADMIN — FENTANYL CITRATE 50 MCG: 50 INJECTION, SOLUTION INTRAMUSCULAR; INTRAVENOUS at 13:32

## 2023-10-14 RX ADMIN — HYDROMORPHONE HYDROCHLORIDE 0.5 MG: 1 INJECTION, SOLUTION INTRAMUSCULAR; INTRAVENOUS; SUBCUTANEOUS at 17:15

## 2023-10-14 RX ADMIN — HYDROMORPHONE HYDROCHLORIDE 0.4 MG: 0.2 INJECTION, SOLUTION INTRAMUSCULAR; INTRAVENOUS; SUBCUTANEOUS at 14:56

## 2023-10-14 RX ADMIN — SENNOSIDES AND DOCUSATE SODIUM 2 TABLET: 50; 8.6 TABLET ORAL at 21:41

## 2023-10-14 RX ADMIN — LIDOCAINE HYDROCHLORIDE 100 MG: 20 INJECTION, SOLUTION INFILTRATION; PERINEURAL at 13:17

## 2023-10-14 RX ADMIN — PHENYLEPHRINE HYDROCHLORIDE 100 MCG: 10 INJECTION INTRAVENOUS at 13:25

## 2023-10-14 RX ADMIN — Medication 30 ML: at 12:44

## 2023-10-14 RX ADMIN — PROPOFOL 160 MG: 10 INJECTION, EMULSION INTRAVENOUS at 13:17

## 2023-10-14 RX ADMIN — Medication 2 G: at 13:05

## 2023-10-14 RX ADMIN — ATORVASTATIN CALCIUM 40 MG: 40 TABLET, FILM COATED ORAL at 19:50

## 2023-10-14 ASSESSMENT — ACTIVITIES OF DAILY LIVING (ADL)
ADLS_ACUITY_SCORE: 35
ADLS_ACUITY_SCORE: 31
ADLS_ACUITY_SCORE: 35
ADLS_ACUITY_SCORE: 31
ADLS_ACUITY_SCORE: 35
ADLS_ACUITY_SCORE: 31

## 2023-10-14 ASSESSMENT — ENCOUNTER SYMPTOMS: DYSRHYTHMIAS: 1

## 2023-10-14 ASSESSMENT — LIFESTYLE VARIABLES: TOBACCO_USE: 0

## 2023-10-14 NOTE — ANESTHESIA PROCEDURE NOTES
Popliteal and Sciatic Procedure Note    Pre-Procedure   Staff -        Anesthesiologist:  Nolberto Morales MD       Performed By: anesthesiologist       Location: pre-op       Pre-Anesthestic Checklist: patient identified, IV checked, site marked, risks and benefits discussed, informed consent, monitors and equipment checked, pre-op evaluation, at physician/surgeon's request and post-op pain management  Timeout:       Correct Patient: Yes        Correct Procedure: Yes        Correct Site: Yes        Correct Position: Yes        Correct Laterality: Yes        Site Marked: Yes  Procedure Documentation  Procedure: Popliteal, Sciatic       Laterality: left       Patient Position: supine       Patient Prep/Sterile Barriers: sterile gloves, mask, patient draped       Skin prep: Chloraprep       Local skin infiltrated with 4 mL of 1% lidocaine.        Needle Type: insulated       Ultrasound guided       1. Ultrasound was used to identify targeted nerve, plexus, vascular marker, or fascial plane and place a needle adjacent to it in real-time.       2. Ultrasound was used to visualize the spread of anesthetic in close proximity to the above referenced structure.       3. A permanent image is entered into the patient's record.    Assessment/Narrative         The placement was negative for: blood aspirated, painful injection and site bleeding       Paresthesias: No.       Bolus given via needle..        Secured via.        Insertion/Infusion Method: Single Shot       Complications: none       Injection made incrementally with aspirations every 5 mL.    Medication(s) Administered   Bupivacaine 0.5% w/ 1:400K Epi (Injection) - Injection   30 mL - 10/14/2023 12:44:00 PM   Comments:  Sciatic nerve block via popliteal approach    Patient tolerated the procedure well.    1.  Under ultrasound guidance, the needle was inserted and placed in close proximity to the target nerve(s).  2. Ultrasound was also used to visualize the spread  "of the anesthetic in close proximity to the nerve(s) being blocked.  Local anesthetic was given in incremental doses with intermittent negative aspiration.    3. The nerve(s) appeared anatomically normal.    4. There were no apparent abnormal pathological findings.    5. A permanent ultrasound image was saved in the patient's record.      The surgeon has given a verbal order transferring care of this patient to me for the performance of a regional analgesia block for post-op pain control. It is requested of me because I am uniquely trained and qualified to perform this block and the surgeon is neither trained nor qualified to perform this procedure.Ultrasound Interpretation, peripheral nerve block.        FOR [facility] ([facility] [place] ONLY:   Pain Team Contact information: please page the Pain Team [employer]. Search \"Pain\". During daytime hours, please page the attending first. At night please page the resident first.      "

## 2023-10-14 NOTE — ANESTHESIA PROCEDURE NOTES
Airway       Patient location during procedure: OR  Staff -        CRNA: Capri Solis APRN CRNA       Performed By: CRNA  Consent for Airway        Urgency: elective  Indications and Patient Condition       Indications for airway management: sreekanth-procedural         Mask difficulty assessment: 0 - not attempted    Final Airway Details       Final airway type: supraglottic airway    Supraglottic Airway Details        Type: LMA       Brand: I-Gel       LMA size: 5    Post intubation assessment        Placement verified by: capnometry and equal breath sounds        Number of attempts at approach: 1       Secured with: commercial tube ferrer       Ease of procedure: easy

## 2023-10-14 NOTE — PROGRESS NOTES
Dr. Morales approved pt to Tx back to 2207.  Report called to floor.  Per vascular surgery hold heparin until further notice and reinforce dressing should there be any oozing.

## 2023-10-14 NOTE — PLAN OF CARE
Date & Time: 10/13/23-10/14/23 1439-3897    Surgery/POD#: POD 7 L guillotine BKA    Behavior & Aggression: GREEN, anxious  Fall Risk: yes  Orientation: A&Ox4  ABNL VS/O2: VSS on RA  ABNL Labs: BG checks w/ coverage, hgb: 8.0, INR 1.16  Pain Management: Scheduled methadone  Bowel/Bladder: Continent B/B, no BM this shift  Drains: Purewick in place when pt calls to void w/ adequate output, wound vac on L leg stump at -75 continuous  IV: PICC infusing hep 1250 units/hr, collected hep Xa 0614 - pending results  Diet: NPO ex meds at midnight  Activity Level: A2 w/ lift - refuses T/R - education provided  Tests/Procedures: Plan to have a fascia closure of stump - completion of L BKA possibly at 2pm  Anticipated DC Date: Pending TCU placement  Significant Information: No concerns overnight

## 2023-10-14 NOTE — BRIEF OP NOTE
[facility]    Brief Operative Note    Pre-operative diagnosis: Hx of BKA, left (H) [Z89.512]  Post-operative diagnosis Same as pre-operative diagnosis    Procedure: debridement of left below the knee amputation, Left - Leg    Surgeon: Surgeon(s) and Role:     * Lan Otto MD - Primary     * Clover Dorman DO - Assisting  Anesthesia: General   Estimated Blood Loss: 50 mL from 10/14/2023  1:02 PM to 10/14/2023  1:55 PM      Drains: None  Specimens:   ID Type Source Tests Collected by Time Destination   1 : left tibia and left fibula amputation revision Tissue Leg, Below Knee, Left SURGICAL PATHOLOGY EXAM Lan Otto MD 10/14/2023  1:37 PM      Findings:   Macerated skin with erythema extending up lateral portion of distal leg with significant edema, unable to close BKA at this time. Stump debrided with further resection of tibia and fibula, muscle otherwise appeared healthy and viable. Dry dressings applied to avoid adhesives on skin .  Complications: None.  Implants: * No implants in log *      Cont to hold heparin infusion to ensure hemostasis today

## 2023-10-14 NOTE — ANESTHESIA PREPROCEDURE EVALUATION
Anesthesia Pre-Procedure Evaluation    Patient: Linda Loredo   MRN: [id number] : 1945        Procedure : Procedure(s):  AMPUTATION, BELOW KNEE THIS IS A COMPLETION OF THE LEFT BELOW THE KNEE AMPUTATION          Past Medical History:   Diagnosis Date    Depressive disorder, not elsewhere classified     Herpes zoster without mention of complication     Hypercalcemia 2007    Mild intermittent asthma     Other urinary incontinence     Type II or unspecified type diabetes mellitus with renal manifestations, uncontrolled(250.42) (H)     Type II or unspecified type diabetes mellitus without mention of complication, not stated as uncontrolled     Unspecified essential hypertension       Past Surgical History:   Procedure Laterality Date    AMPUTATE LEG BELOW KNEE Left 10/7/2023    Procedure: LEFT GUILLOTINE BELOW KNEE AMPUTATION.;  Surgeon: Lan Otto MD;  Location: [facility] OR    CHOLECYSTECTOMY      INCISION AND DRAINAGE FOOT, COMBINED Left 2023    Procedure: Surgical debridement of all nonviable skin and soft tissue and bone left foot;  Surgeon: Nolberto Thorne DPM;  Location: [facility] OR    IR LOWER EXTREMITY ANGIOGRAM LEFT  10/3/2023    ligation of fallopian tube      OPEN REDUCTION INTERNAL FIXATION ANKLE  10/13/11    left    pinning of left 2nd toe        Allergies   Allergen Reactions    Prednisone Nausea and Vomiting    Morphine Nausea and Vomiting    Morphine [Fumaric Acid] Nausea and Vomiting      Social History     Tobacco Use    Smoking status: Never    Smokeless tobacco: Never   Substance Use Topics    Alcohol use: No      Wt Readings from Last 1 Encounters:   10/14/23 118.2 kg (260 lb 9.6 oz)        Anesthesia Evaluation            ROS/MED HX  ENT/Pulmonary:     (+) sleep apnea,                    asthma               (-) tobacco use   Neurologic:       Cardiovascular:     (+) Dyslipidemia hypertension- -   -  - -                        dysrhythmias, a-fib,              METS/Exercise Tolerance:     Hematologic:     (+)      anemia,          Musculoskeletal:       GI/Hepatic:       Renal/Genitourinary:     (+) renal disease, type: CRI,            Endo:     (+)  type II DM,             Obesity,       Psychiatric/Substance Use:     (+) psychiatric history depression       Infectious Disease:       Malignancy:       Other:            Physical Exam    Airway        Mallampati: III   TM distance: > 3 FB   Neck ROM: full   Mouth opening: > 3 cm    Respiratory Devices and Support         Dental       (+) Modest Abnormalities - crowns, retainers, 1 or 2 missing teeth      Cardiovascular   cardiovascular exam normal          Pulmonary   pulmonary exam normal                OUTSIDE LABS:  CBC:   Lab Results   Component Value Date    WBC 6.8 10/12/2023    WBC 5.9 10/11/2023    HGB 8.0 (L) 10/14/2023    HGB 8.4 (L) 10/13/2023    HCT 29.6 (L) 10/12/2023    HCT 25.7 (L) 10/11/2023     10/12/2023     10/11/2023     BMP:   Lab Results   Component Value Date     10/13/2023     10/11/2023    POTASSIUM 4.0 10/13/2023    POTASSIUM 4.3 10/11/2023    CHLORIDE 104 10/13/2023    CHLORIDE 103 10/11/2023    CO2 30 (H) 10/13/2023    CO2 29 10/11/2023    BUN 17.8 10/13/2023    BUN 15.4 10/11/2023    CR 1.01 (H) 10/13/2023    CR 1.01 (H) 10/13/2023    GLC 95 10/14/2023     (H) 10/14/2023     COAGS:   Lab Results   Component Value Date    PTT 23 06/16/2006    INR 1.16 (H) 10/14/2023     POC:   Lab Results   Component Value Date     (H) 04/27/2006     HEPATIC:   Lab Results   Component Value Date    ALBUMIN 3.1 (L) 09/22/2023    PROTTOTAL 6.7 09/22/2023    ALT 43 09/22/2023    AST 90 (H) 09/22/2023    ALKPHOS 82 09/22/2023    BILITOTAL 0.6 09/22/2023     OTHER:   Lab Results   Component Value Date    LACT 0.9 05/26/2023    A1C 7.0 (H) 09/23/2023    ZAKIA 10.2 10/13/2023    PHOS 2.9 10/10/2023    MAG 2.0 10/10/2023    TSH 2.36 10/15/2007    T4 1.11 09/05/2007    CRP 16.0 (H)  03/28/2008    SED 32 (H) 03/28/2008       Anesthesia Plan    ASA Status:  3    NPO Status:  NPO Appropriate    Anesthesia Type: General.     - Airway: LMA   Induction: Intravenous, Propofol.   Maintenance: Balanced.        Consents    Anesthesia Plan(s) and associated risks, benefits, and realistic alternatives discussed. Questions answered and patient/representative(s) expressed understanding.     - Discussed:     - Discussed with:  Patient            Postoperative Care    Pain management: Multi-modal analgesia.   PONV prophylaxis: Ondansetron (or other 5HT-3), Dexamethasone or Solumedrol, Background Propofol Infusion     Comments:                Nolberto Morales MD

## 2023-10-14 NOTE — PLAN OF CARE
Date & Time: 10/14/23 3832-3707  Surgery/POD#: 7 L BKA, POD 0 debridement of left below the knee amputation  Fall Risk: Yes  Orientation: A&Ox4  ABNL VS/O2: VSS on RA  ABNL Labs: Hgb 8. , 95  Pain Management: Scheduled Methadone  Bowel/Bladder: Intermittent purewick use  Drains: PICC SL, Wound vac to LLE  Diet: NPO  Activity Level: Lift  Tests/Procedures: Debridement of left below the knee amputation   Anticipated  DC Date: Pending  Significant Information: Contact ISO. Vascular following

## 2023-10-14 NOTE — ANESTHESIA CARE TRANSFER NOTE
Patient: Linda Loredo    Procedure: Procedure(s):  debridement of left below the knee amputation       Diagnosis: Hx of BKA, left (H) [Z89.512]  Diagnosis Additional Information: No value filed.    Anesthesia Type:   General     Note:    Oropharynx: oropharynx clear of all foreign objects  Level of Consciousness: awake  Oxygen Supplementation: face mask  Level of Supplemental Oxygen (L/min / FiO2): 8    Dentition: dentition unchanged  Vital Signs Stable: post-procedure vital signs reviewed and stable  Report to RN Given: handoff report given  Patient transferred to: PACU    Handoff Report: Identifed the Patient, Identified the Reponsible Provider, Reviewed the pertinent medical history, Discussed the surgical course, Reviewed Intra-OP anesthesia mangement and issues during anesthesia, Set expectations for post-procedure period and Allowed opportunity for questions and acknowledgement of understanding      Vitals:  Vitals Value Taken Time   /83 10/14/23 1400   Temp     Pulse 87 10/14/23 1402   Resp 42 10/14/23 1402   SpO2 97 % 10/14/23 1402   Vitals shown include unfiled device data.    Electronically Signed By: MOE Baird CRNA  October 14, 2023  2:03 PM

## 2023-10-14 NOTE — PLAN OF CARE
Date & Time: October 13, 2023 3032-7660  Surgery/POD#:  POD 6 LEFT GUILLOTINE BELOW KNEE AMPUTATION.   Behavior & Aggression: Green, fluctuating mood and anxious at times  Fall Risk: Yes   Orientation:A&Ox4   ABNL VS/O2: VSS on RA   ABNL Labs: BG achs   Pain Management: scheduled methadone for pain along with prn dilaudid for wound vac change  Bowel/Bladder: Continent of B&B. Purewick in place, no BM   Drains: Purewick and wound vac  Diet: NPO at midnight for surgery 10/14  Activity Level: 2 Assist, lift. Denies repo at times, pt educated on benefits. Pt will shift weight sometimes.  Tests/Procedures: Surgery 10/14  Anticipated  DC Date: Pending   Significant Information: PICC infusing at 1250. 10A recheck in am. Wound vac in place. Perineum and coccyx, redness and breakdown noted. LLE heel and calf wound covered with mepilex CDI..

## 2023-10-14 NOTE — PROGRESS NOTES
"VASCULAR SURGERY PROGRESS NOTE    Subjective:  No acute issues or complaints. Planned for OR today for completion left BKA.         Objective:  Intake/Output Summary (Last 24 hours) at 10/13/2023 0730  Last data filed at 10/13/2023 0029  Gross per 24 hour   Intake 0 ml   Output 700 ml   Net -700 ml     PHYSICAL EXAM:  /68 (BP Location: Left arm)   Pulse 77   Temp 98.3  F (36.8  C) (Oral)   Resp 16   Ht 1.676 m (5' 6\")   Wt 118.2 kg (260 lb 9.6 oz)   SpO2 94%   BMI 42.06 kg/m    Resting in bed, awake, alert, appropriate  Wound VAC to the left below-knee amputation stump. Left leg with cellulitis and erythema at stump, drainage from wound on lateral aspect of left leg, skin appears friable and with continued significant edema to LLE.        ASSESSMENT:  Postop day 7 status post left guillotine BKA        PLAN:  -OR tentatively today for completion left BKA  -continue heparin drip for atrial fibrillation, hold on call to OR  -further recs pending OR course  -cont medical care per medical team    Clover Dorman, DO  Fellow  VASCULAR SURGERY                   "

## 2023-10-14 NOTE — ANESTHESIA POSTPROCEDURE EVALUATION
Patient: Linda Loredo    Procedure: Procedure(s):  debridement of left below the knee amputation       Anesthesia Type:  General    Note:  Disposition: Inpatient   Postop Pain Control: Uneventful            Sign Out: Well controlled pain   PONV: No   Neuro/Psych: Uneventful            Sign Out: Acceptable/Baseline neuro status   Airway/Respiratory: Uneventful            Sign Out: Acceptable/Baseline resp. status   CV/Hemodynamics: Uneventful            Sign Out: Acceptable CV status   Other NRE: NONE   DID A NON-ROUTINE EVENT OCCUR? No           Last vitals:  Vitals Value Taken Time   /62 10/14/23 1430   Temp 36.5  C (97.7  F) 10/14/23 1358   Pulse 80 10/14/23 1435   Resp 17 10/14/23 1435   SpO2 92 % 10/14/23 1435   Vitals shown include unfiled device data.    Electronically Signed By: Nolberto Morales MD  October 14, 2023  2:36 PM

## 2023-10-14 NOTE — PROGRESS NOTES
[facility]    HOSPITALIST PROGRESS NOTE :   --------------------------------------------------    Date of Admission:  9/29/2023    Cumulative Summary: Linda Loredo is a 78 year old female admitted on 9/29/2023. She was transferred from [facility] for L heel osteomyelitis.  She underwent BKA on 10/7/23.  Plan to return to OR mid-week.     Assessment & Plan     L foot decubitus ulcer status debridement by podiatry on 9/26 with multiple organisms, L heel osteomyelitis/cellulitis  Chronic venous stasis  MRSA positive hx  S/P BKA on 10/7/23  Acute blood loss anemia  Brought in 9/23 after welfare check by police found to have L leg swelling and erythema.  Hx venous stasis ulcers. Presents with L heel decubitus ulcer with osteo. Wound with multiple organisms incl pseudomonas, morganella, e faecalis, bordetella, proteus status underwent debridement 9/26 by podiatry.   Blood cultures done on 9/25/2023 have been negative.  ALMA w/ poor flow, podiatry at outside hospital requested transfer where there are vascular services.  Patient is currently on vancomycin and meropenem as per prior recommendations and status post PICC line placement on 9/27 and infectious disease, podiatry and vascular surgery team assisting.  After considering options she decided to proceed with amputation.   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  --Pain medications were adjusted as wound VAC change on October 10 was very painful  -- Patient requested to be made aware been she is planned to undergo her next wound VAC change so she can get as needed medication half an hour before.  -- wound vac was changed yesterday , Pain med's were helpful    -- Patient is NPO after midnight   -- discharge Heparin this morning as per Vascular recommendations   -- Plan for completion of left BKA later today   --  Post-op site cares, anticoagulation, activity restrictions, pain meds per surgical team.   --  Remains on heparin drip, no plan to move to oral (warfarin) until after all surgeries completed  --  Monitor hgb, transfuse if <7  --  planning TCU on discharge     Acute on chronic pain syndrome:   -- Continue methadone 10 mg daily midday and 5 mg TID  -- IV and PO dilaudid prn for breakthrough pain (doses increased 10/10)  -- Patient was also evaluated by Psych yesterday , no concern for depression , they encouraged patient to continue to use her coping skills to adjust after this surgery      Urine culture positive for E faecalis, Staph aureus and E. Coli  -- UA done at outside hospital on 9/23 showed above, she completed >10 day course of antibiotics with vancomycin, ceftriaxone, meropenem on 10/8     DM II:  [PTA degludec 35 units at HS, metformin 2000 mg daily, ozempic]  A1c 7.0 9/23/23. Has been on insulin pump in the past but this was changed to tresiba 8/2023.     -- hold metformin, Ozempic   -- medium resistance SSI  -- Patient has been receiving Tresiba 15 units at bedtime,did not receive last night in anticipation of surgery this morning    -- Plan to resume tresiba tonight      Atrial fibrillation  HTN  HLD:  [PTA- warfarin, simvastatin 20 mg daily, lisinopril 5 mg daily]    -- Continue with statin, warfarin currently is on hold  -- Continue heparin gtt, start warfarin once all surgeries completed  -- will follow up on surgery recommendations regarding starting patient back on heparin infusion after the procedure      CKD III:  --Baseline creatinine 1-1.2.   --Stable, monitor occasionally     Failure to thrive:  As above, called by police for welfare check. ? Safety at home. SW had seen at [facility].      Obesity:  -- Complicates care     BERLIN:  -- continue  "home cpap    Clinically Significant Risk Factors           # Hypercalcemia: Highest Ca = 10.2 mg/dL in last 2 days, will monitor as appropriate     # Coagulation Defect: INR = 1.16 (Ref range: 0.85 - 1.15) and/or PTT = N/A, will monitor for bleeding    # Hypertension: Noted on problem list       # DMII: A1C = 7.0 % (Ref range: <5.7 %) within past 6 months   # Severe Obesity: Estimated body mass index is 42.06 kg/m  as calculated from the following:    Height as of this encounter: 1.676 m (5' 6\").    Weight as of this encounter: 118.2 kg (260 lb 9.6 oz).             Diet: Snacks/Supplements Adult: Glucerna; Between Meals  NPO per Anesthesia Guidelines for Procedure/Surgery Except for: Meds    Braga Catheter: Not present  DVT Prophylaxis: Heparin GTT  Code Status: Full Code    The patient's care was discussed with the Patient.    Medical Decision Making     50 MINUTES SPENT BY ME on the date of service doing chart review, history, exam, documentation & further activities per the note.        Disposition Plan   --Social work team has been consulted, tentative plan is possible discharge to TCU depending on surgical recovery.  --According to patient, currently patient lives in independent living and is usually able to take care of activities of daily living.  Daughter and son-in-law live very close at 1 mile distance from her home.  Daughter is helpful in bringing her groceries and cooked meal from her home.       Expected Discharge Date: 10/17/2023    Discharge Delays: Procedure Pending (enter procedure & time in comments)  Destination: inpatient rehabilitation facility  Discharge Comments: amputation closure this week.-- now moved to Saturday 10/14     Entered: Divya Song MD 10/14/2023, 8:03 AM       Divya Song MD, MD, FACP  Text Page (7am - 6pm)    ----------------------------------------------------------------------------------------------------------------------      Interval History     Patient was seen and " examined this morning, currently n.p.o., aware about plan for going for surgery later this afternoon, met with  Only this morning, heparin has been stopped, patient is denying any new complaints, underwent wound VAC changes yesterday, received pain medications before the change and was able to tolerate the procedure much better at this time as compared to last time, appreciative of all the help.    -Data reviewed today: I reviewed all new labs and imaging results over the last 24 hours.    I personally reviewed no images or EKG's today.    Physical Exam   Temp: 98.3  F (36.8  C) Temp src: Oral BP: 124/68 Pulse: 77   Resp: 16 SpO2: 94 % O2 Device: None (Room air)    Vitals:    10/09/23 0419 10/10/23 0530 10/14/23 0600   Weight: 123.1 kg (271 lb 6.2 oz) 115.1 kg (253 lb 12 oz) 118.2 kg (260 lb 9.6 oz)     Vital Signs with Ranges  Temp:  [98  F (36.7  C)-98.7  F (37.1  C)] 98.3  F (36.8  C)  Pulse:  [71-95] 77  Resp:  [15-17] 16  BP: (102-127)/(56-68) 124/68  SpO2:  [90 %-94 %] 94 %  I/O last 3 completed shifts:  In: 20 [P.O.:20]  Out: 400 [Urine:400]    GENERAL: Alert , awake and oriented. NAD. Conversational, appropriate.   HEENT: Normocephalic. EOMI. No icterus or injection. Nares normal.   LUNGS: Clear to auscultation. No dyspnea at rest.   HEART: Regular rate. Extremities perfused.   ABDOMEN: Soft, nontender, and nondistended. Positive bowel sounds.   EXTREMITIES: Left lower extremity with amputation, wound VAC in place with sanguinous drainage in tubing, right upper extremity PICC line in place  NEUROLOGIC: Moves extremities x4 on command. No acute focal neurologic abnormalities noted.     Medications    heparin 1,400 Units/hr (10/14/23 0733)    - MEDICATION INSTRUCTIONS -        aspirin  81 mg Oral Daily    atorvastatin  40 mg Oral QPM    insulin aspart  1-7 Units Subcutaneous TID AC    insulin aspart  1-5 Units Subcutaneous At Bedtime    [Held by provider] insulin degludec  15 Units Subcutaneous At Bedtime     Dave  1 each Oral BID 09 12    methadone  10 mg Oral Daily    methadone  5 mg Oral TID    miconazole   Topical BID    multivitamin w/minerals  1 tablet Oral Daily    senna-docusate  2 tablet Oral BID    sodium chloride (PF)  10-40 mL Intracatheter Q8H       Data   Recent Labs   Lab 10/14/23  0614 10/14/23  0207 10/13/23  2149 10/13/23  1656 10/13/23  0553 10/13/23  0545 10/12/23  1203 10/12/23  0813 10/11/23  0822 10/11/23  0616 10/10/23  1156 10/10/23  0613   WBC  --   --   --   --   --   --   --  6.8  --  5.9  --  6.2   HGB 8.0*  --   --   --   --  8.4*  --  9.3*  --  8.1*   < > 6.6*   MCV  --   --   --   --   --   --   --  95  --  95  --  97   PLT  --   --   --   --   --   --   --  190  --  187  --  201   INR 1.16*  --   --   --   --  1.15  --  1.18*  --  1.24*  --  1.22*   NA  --   --   --   --   --  142  --   --   --  138  --  139   POTASSIUM  --   --   --   --   --  4.0  --   --   --  4.3  --  4.5   CHLORIDE  --   --   --   --   --  104  --   --   --  103  --  103   CO2  --   --   --   --   --  30*  --   --   --  29  --  28   BUN  --   --   --   --   --  17.8  --   --   --  15.4  --  15.3   CR  --   --   --   --   --  1.01*  1.01*  --   --   --  0.93  --  0.92   ANIONGAP  --   --   --   --   --  8  --   --   --  6*  --  8   ZAKIA  --   --   --   --   --  10.2  --   --   --  10.1  --  10.1   GLC  --  142* 168* 158*   < > 143*   < >  --    < > 121*   < > 121*    < > = values in this interval not displayed.       Imaging:   No results found for this or any previous visit (from the past 24 hour(s)).

## 2023-10-15 LAB
ANION GAP SERPL CALCULATED.3IONS-SCNC: 7 MMOL/L (ref 7–15)
BUN SERPL-MCNC: 19 MG/DL (ref 8–23)
CALCIUM SERPL-MCNC: 9.8 MG/DL (ref 8.8–10.2)
CHLORIDE SERPL-SCNC: 104 MMOL/L (ref 98–107)
CREAT SERPL-MCNC: 1.02 MG/DL (ref 0.51–0.95)
CREAT SERPL-MCNC: 1.02 MG/DL (ref 0.51–0.95)
DEPRECATED HCO3 PLAS-SCNC: 29 MMOL/L (ref 22–29)
EGFRCR SERPLBLD CKD-EPI 2021: 56 ML/MIN/1.73M2
EGFRCR SERPLBLD CKD-EPI 2021: 56 ML/MIN/1.73M2
ERYTHROCYTE [DISTWIDTH] IN BLOOD BY AUTOMATED COUNT: 14.6 % (ref 10–15)
GLUCOSE BLDC GLUCOMTR-MCNC: 133 MG/DL (ref 70–99)
GLUCOSE BLDC GLUCOMTR-MCNC: 147 MG/DL (ref 70–99)
GLUCOSE BLDC GLUCOMTR-MCNC: 152 MG/DL (ref 70–99)
GLUCOSE BLDC GLUCOMTR-MCNC: 188 MG/DL (ref 70–99)
GLUCOSE BLDC GLUCOMTR-MCNC: 205 MG/DL (ref 70–99)
GLUCOSE SERPL-MCNC: 172 MG/DL (ref 70–99)
HCT VFR BLD AUTO: 26.8 % (ref 35–47)
HGB BLD-MCNC: 8.2 G/DL (ref 11.7–15.7)
INR PPP: 1.22 (ref 0.85–1.15)
MCH RBC QN AUTO: 30 PG (ref 26.5–33)
MCHC RBC AUTO-ENTMCNC: 30.6 G/DL (ref 31.5–36.5)
MCV RBC AUTO: 98 FL (ref 78–100)
PLATELET # BLD AUTO: 189 10E3/UL (ref 150–450)
POTASSIUM SERPL-SCNC: 4.2 MMOL/L (ref 3.4–5.3)
RBC # BLD AUTO: 2.73 10E6/UL (ref 3.8–5.2)
SODIUM SERPL-SCNC: 140 MMOL/L (ref 135–145)
UFH PPP CHRO-ACNC: 0.21 IU/ML
UFH PPP CHRO-ACNC: 0.61 IU/ML
UFH PPP CHRO-ACNC: <0.1 IU/ML
WBC # BLD AUTO: 7.6 10E3/UL (ref 4–11)

## 2023-10-15 PROCEDURE — 85520 HEPARIN ASSAY: CPT | Performed by: STUDENT IN AN ORGANIZED HEALTH CARE EDUCATION/TRAINING PROGRAM

## 2023-10-15 PROCEDURE — 250N000013 HC RX MED GY IP 250 OP 250 PS 637: Performed by: STUDENT IN AN ORGANIZED HEALTH CARE EDUCATION/TRAINING PROGRAM

## 2023-10-15 PROCEDURE — 85520 HEPARIN ASSAY: CPT | Performed by: INTERNAL MEDICINE

## 2023-10-15 PROCEDURE — 250N000011 HC RX IP 250 OP 636: Performed by: STUDENT IN AN ORGANIZED HEALTH CARE EDUCATION/TRAINING PROGRAM

## 2023-10-15 PROCEDURE — 80048 BASIC METABOLIC PNL TOTAL CA: CPT | Performed by: INTERNAL MEDICINE

## 2023-10-15 PROCEDURE — 250N000011 HC RX IP 250 OP 636: Mod: JZ | Performed by: STUDENT IN AN ORGANIZED HEALTH CARE EDUCATION/TRAINING PROGRAM

## 2023-10-15 PROCEDURE — 85610 PROTHROMBIN TIME: CPT | Performed by: STUDENT IN AN ORGANIZED HEALTH CARE EDUCATION/TRAINING PROGRAM

## 2023-10-15 PROCEDURE — 120N000001 HC R&B MED SURG/OB

## 2023-10-15 PROCEDURE — 99233 SBSQ HOSP IP/OBS HIGH 50: CPT | Performed by: INTERNAL MEDICINE

## 2023-10-15 PROCEDURE — 85027 COMPLETE CBC AUTOMATED: CPT | Performed by: INTERNAL MEDICINE

## 2023-10-15 RX ADMIN — INSULIN ASPART 2 UNITS: 100 INJECTION, SOLUTION INTRAVENOUS; SUBCUTANEOUS at 18:53

## 2023-10-15 RX ADMIN — HYDROMORPHONE HYDROCHLORIDE 4 MG: 2 TABLET ORAL at 07:13

## 2023-10-15 RX ADMIN — Medication 1 PACKET: at 11:35

## 2023-10-15 RX ADMIN — METHADONE HYDROCHLORIDE 5 MG: 5 TABLET ORAL at 11:29

## 2023-10-15 RX ADMIN — MICONAZOLE NITRATE: 2 POWDER TOPICAL at 08:36

## 2023-10-15 RX ADMIN — INSULIN ASPART 1 UNITS: 100 INJECTION, SOLUTION INTRAVENOUS; SUBCUTANEOUS at 11:32

## 2023-10-15 RX ADMIN — HYDROMORPHONE HYDROCHLORIDE 0.5 MG: 1 INJECTION, SOLUTION INTRAMUSCULAR; INTRAVENOUS; SUBCUTANEOUS at 12:39

## 2023-10-15 RX ADMIN — SENNOSIDES AND DOCUSATE SODIUM 2 TABLET: 50; 8.6 TABLET ORAL at 08:34

## 2023-10-15 RX ADMIN — METHADONE HYDROCHLORIDE 10 MG: 10 TABLET ORAL at 14:59

## 2023-10-15 RX ADMIN — HYDROMORPHONE HYDROCHLORIDE 4 MG: 2 TABLET ORAL at 21:42

## 2023-10-15 RX ADMIN — ATORVASTATIN CALCIUM 40 MG: 40 TABLET, FILM COATED ORAL at 21:41

## 2023-10-15 RX ADMIN — METHADONE HYDROCHLORIDE 5 MG: 5 TABLET ORAL at 05:12

## 2023-10-15 RX ADMIN — MICONAZOLE NITRATE: 2 POWDER TOPICAL at 21:43

## 2023-10-15 RX ADMIN — HYDROMORPHONE HYDROCHLORIDE 4 MG: 2 TABLET ORAL at 02:06

## 2023-10-15 RX ADMIN — SENNOSIDES AND DOCUSATE SODIUM 2 TABLET: 50; 8.6 TABLET ORAL at 21:42

## 2023-10-15 RX ADMIN — ASPIRIN 81 MG: 81 TABLET, COATED ORAL at 08:35

## 2023-10-15 RX ADMIN — HYDROMORPHONE HYDROCHLORIDE 0.5 MG: 1 INJECTION, SOLUTION INTRAMUSCULAR; INTRAVENOUS; SUBCUTANEOUS at 08:38

## 2023-10-15 RX ADMIN — HYDROMORPHONE HYDROCHLORIDE 4 MG: 2 TABLET ORAL at 11:29

## 2023-10-15 RX ADMIN — METHADONE HYDROCHLORIDE 5 MG: 5 TABLET ORAL at 21:41

## 2023-10-15 RX ADMIN — HEPARIN SODIUM 1850 UNITS/HR: 10000 INJECTION, SOLUTION INTRAVENOUS at 16:30

## 2023-10-15 RX ADMIN — Medication 1 PACKET: at 08:37

## 2023-10-15 RX ADMIN — MULTIPLE VITAMINS W/ MINERALS TAB 1 TABLET: TAB at 08:35

## 2023-10-15 ASSESSMENT — ACTIVITIES OF DAILY LIVING (ADL)
ADLS_ACUITY_SCORE: 31

## 2023-10-15 NOTE — PROVIDER NOTIFICATION
MD Notification    Notified Person: MD    Notified Person Name:  Song    Notification Date/Time: 10/14/2023 at 6 pm    Notification Interaction: vocera page    Purpose of Notification: L arm swelling. Pt reported new onset of L arm swelling.    Orders Received: elevate the arm. If not ago away by 8 pm, let night provider know.    Comments:

## 2023-10-15 NOTE — PROGRESS NOTES
[facility]    HOSPITALIST PROGRESS NOTE :   --------------------------------------------------    Date of Admission:  9/29/2023    Cumulative Summary: Linda Loredo is a 78 year old female admitted on 9/29/2023. She was transferred from [facility] for L heel osteomyelitis.  She underwent BKA on 10/7/23.  Plan to return to OR mid-week.     Assessment & Plan     L foot decubitus ulcer status debridement by podiatry on 9/26 with multiple organisms, L heel osteomyelitis/cellulitis  Chronic venous stasis  MRSA positive hx  S/P BKA on 10/7/23  Acute blood loss anemia  Brought in 9/23 after welfare check by police found to have L leg swelling and erythema.  Hx venous stasis ulcers. Presents with L heel decubitus ulcer with osteo. Wound with multiple organisms incl pseudomonas, morganella, e faecalis, bordetella, proteus status underwent debridement 9/26 by podiatry.   Blood cultures done on 9/25/2023 have been negative.  ALMA w/ poor flow, podiatry at outside hospital requested transfer where there are vascular services.  Patient is currently on vancomycin and meropenem as per prior recommendations and status post PICC line placement on 9/27 and infectious disease, podiatry and vascular surgery team assisting.  After considering options she decided to proceed with amputation.   * 10/7/23 Left guillotine below knee amputation  *10/10 1u PRBCs given for hgb 6.6  *Finished IV meropenem and IV vanco on 10/08 after initial surgery   *Patient underwent debridement of left BKA,, Unable to close BKA at this time    -- Patient was seen and examined, tolerated procedure well yesterday  -- As above, patient underwent left below-knee amputation on October/7/23.  -- Patient again went to the OR where further debridement of left BKA was done, they were unable to close BKA at this time.  -- Patient had significant discomfort earlier this morning when dressing was changed around 5 AM.  -- Discussed the  case with bedside nursing, I have requested nursing to inform patient before any dressing changes.  --Please premedicate patient with oral Dilaudid half an hour before and then also give her IV Dilaudid 15 minutes before dressing change otherwise dressing changes are very painful for patient  --Heparin infusion has been restarted per vascular surgery, was a stopped earlier this morning due to oozing  --If no further oozing from the local wound, then heparin infusion can be restarted as recommended by vascular surgery.  --Follow-up on vascular surgery recommendations when patient will be returning for completion of left BKA and flap closure.  --  Post-op site cares, anticoagulation, activity restrictions, pain meds per surgical team.   --  Remains on heparin drip, no plan to move to oral (warfarin) until after all surgeries completed  --  Monitor hgb, transfuse if <7  --  planning TCU on discharge  -- I did update daughter also from the bedside who was present on the speaker phone.  I also updated the bedside nursing.     Acute on chronic pain syndrome:   -- Continue methadone 10 mg daily midday and 5 mg TID  -- IV and PO dilaudid prn for breakthrough pain (doses increased 10/10)  -- Patient was also evaluated by Psych  , no concern for depression , they encouraged patient to continue to use her coping skills to adjust after this surgery      Urine culture positive for E faecalis, Staph aureus and E. Coli  -- UA done at outside hospital on 9/23 showed above, she completed >10 day course of antibiotics with vancomycin, ceftriaxone, meropenem on 10/8     DM II:  [PTA degludec 35 units at HS, metformin 2000 mg daily, ozempic]  A1c 7.0 9/23/23. Has been on insulin pump in the past but this was changed to tresiba 8/2023.     -- hold metformin, Ozempic   -- medium resistance SSI  -- Patient has been receiving Tresiba 15 units at bedtime,did not receive last night postoperatively  --Continue resume Tresiba 10 units from  "tonight, can be increased to 15 units from tomorrow.     Atrial fibrillation  HTN  HLD:  [PTA- warfarin, simvastatin 20 mg daily, lisinopril 5 mg daily]    -- Continue with statin, warfarin currently is on hold  -- Continue heparin gtt, start warfarin once all surgeries completed  -- Vascular surgery is okay with resuming heparin infusion postoperatively.     CKD III:  --Baseline creatinine 1-1.2.   --Stable, monitor occasionally     Failure to thrive:  As above, called by police for welfare check. ? Safety at home. SW had seen at [facility].      Obesity:  -- Complicates care     BERLIN:  -- continue home cpap    Clinically Significant Risk Factors               # Coagulation Defect: INR = 1.22 (Ref range: 0.85 - 1.15) and/or PTT = N/A, will monitor for bleeding    # Hypertension: Noted on problem list       # DMII: A1C = 7.0 % (Ref range: <5.7 %) within past 6 months   # Severe Obesity: Estimated body mass index is 42.06 kg/m  as calculated from the following:    Height as of this encounter: 1.676 m (5' 6\").    Weight as of this encounter: 118.2 kg (260 lb 9.6 oz).             Diet: Snacks/Supplements Adult: Glucerna; Between Meals  Consistent Carbohydrate Diet Moderate Consistent Carb (60 g CHO per Meal) Diet    Braga Catheter: Not present  DVT Prophylaxis: Heparin GTT  Code Status: Full Code    The patient's care was discussed with the Patient.    Medical Decision Making     50 MINUTES SPENT BY ME on the date of service doing chart review, history, exam, documentation & further activities per the note.        Disposition Plan     --Social work team has been consulted, tentative plan is possible discharge to TCU depending on surgical recovery.  --According to patient, currently patient lives in independent living and is usually able to take care of activities of daily living.  Daughter and son-in-law live very close at 1 mile distance from her home.  Daughter is helpful in bringing her groceries and cooked meal from her " home.      Expected Discharge Date: 10/17/2023    Discharge Delays: Procedure Pending (enter procedure & time in comments)  Destination: inpatient rehabilitation facility  Discharge Comments: amputation closure this week.-- now moved to Saturday 10/14     Entered: Divya Song MD 10/15/2023, 8:05 AM       Divya Song MD, MD, FACP  Text Page (7am - 6pm)    ----------------------------------------------------------------------------------------------------------------------      Interval History     Patient was seen and examined this morning.  Patient has undergone surgery yesterday but unable to close the flap and patient has undergone further debridement.  Patient told me about incident of dressing change earlier this morning around 5 AM when pain medications were not administered as per plan.  Emotional support was provided, I also discussed the case with patient daughter and patient bedside nursing in detail.  I have added instructions regarding giving patient p.o. and IV pain medications before any dressing changes to a sticky note, on patient pulled in the room and I have also discussed with personally with bedside nursing who will also sign out to the nurse who will come on duty next.  We discussed about giving her Tresiba 10 units this evening and then increasing it back to 15 3 units from tomorrow.    -Data reviewed today: I reviewed all new labs and imaging results over the last 24 hours.    I personally reviewed no images or EKG's today.    Physical Exam   Temp: 98.7  F (37.1  C) Temp src: Oral BP: 131/66 Pulse: 83   Resp: 18 SpO2: 94 % O2 Device: None (Room air) Oxygen Delivery: 7 LPM  Vitals:    10/09/23 0419 10/10/23 0530 10/14/23 0600   Weight: 123.1 kg (271 lb 6.2 oz) 115.1 kg (253 lb 12 oz) 118.2 kg (260 lb 9.6 oz)     Vital Signs with Ranges  Temp:  [97.7  F (36.5  C)-98.7  F (37.1  C)] 98.7  F (37.1  C)  Pulse:  [] 83  Resp:  [11-24] 18  BP: (111-162)/(55-96) 131/66  FiO2 (%):  [2 %] 2  %  SpO2:  [90 %-98 %] 94 %  I/O last 3 completed shifts:  In: 445 [P.O.:15; I.V.:430]  Out: 650 [Urine:600; Blood:50]    GENERAL: Alert , awake and oriented. NAD. Conversational, appropriate.   HEENT: Normocephalic. EOMI. No icterus or injection. Nares normal.   LUNGS: Clear to auscultation. No dyspnea at rest.   HEART: Regular rate. Extremities perfused.   ABDOMEN: Soft, nontender, and nondistended. Positive bowel sounds.   EXTREMITIES: Left lower extremity with amputation, right upper extremity PICC line in place  NEUROLOGIC: Moves extremities x4 on command. No acute focal neurologic abnormalities noted.     Medications    heparin Stopped (10/15/23 0719)    - MEDICATION INSTRUCTIONS -        aspirin  81 mg Oral Daily    atorvastatin  40 mg Oral QPM    insulin aspart  1-7 Units Subcutaneous TID AC    insulin aspart  1-5 Units Subcutaneous At Bedtime    insulin degludec  10 Units Subcutaneous At Bedtime    Dave  1 each Oral BID 09 12    methadone  10 mg Oral Daily    methadone  5 mg Oral TID    miconazole   Topical BID    multivitamin w/minerals  1 tablet Oral Daily    senna-docusate  2 tablet Oral BID    sodium chloride (PF)  10-40 mL Intracatheter Q8H       Data   Recent Labs   Lab 10/15/23  0519 10/15/23  0203 10/14/23  2137 10/14/23  0847 10/14/23  0614 10/13/23  0553 10/13/23  0545 10/12/23  1203 10/12/23  0813 10/11/23  0822 10/11/23  0616   WBC 7.6  --   --   --   --   --   --   --  6.8  --  5.9   HGB 8.2*  --   --   --  8.0*  --  8.4*  --  9.3*  --  8.1*   MCV 98  --   --   --   --   --   --   --  95  --  95     --   --   --   --   --   --   --  190  --  187   INR 1.22*  --   --   --  1.16*  --  1.15  --  1.18*  --  1.24*     --   --   --   --   --  142  --   --   --  138   POTASSIUM 4.2  --   --   --   --   --  4.0  --   --   --  4.3   CHLORIDE 104  --   --   --   --   --  104  --   --   --  103   CO2 29  --   --   --   --   --  30*  --   --   --  29   BUN 19.0  --   --   --   --   --  17.8   --   --   --  15.4   CR 1.02*  1.02*  --   --   --   --   --  1.01*  1.01*  --   --   --  0.93   ANIONGAP 7  --   --   --   --   --  8  --   --   --  6*   ZAKIA 9.8  --   --   --   --   --  10.2  --   --   --  10.1   * 147* 132*   < >  --    < > 143*   < >  --    < > 121*    < > = values in this interval not displayed.       Imaging:   No results found for this or any previous visit (from the past 24 hour(s)).

## 2023-10-15 NOTE — PROGRESS NOTES
Diagnosis: POD 8 of L BKA from left heel osteomyelitis. POD 1 of debridement.  Date & Time: 10/14/2023-10/15/2023 [id number]  Orientation: A&Ox4  Activity Level: A2 w/ lift. Refuses repositioning at night.   Behavior & Aggression: Green  Pain Management: Dilaudid given x1. Scheduled Methadone.   ABNL VS/O2: Hypertensive at times otherwise VSS on RA.   Tele: NA  ABNL Lab/BG: B, 132, hep Xa 0.21 (goal 0.25-0.5, recheck at 1145).  Diet: Consistent carb diet.   Bowel/Bladder: Continent of B&B, patient will call when she wants to urinate. Use purwick then take out. Pt. Refused to go to the bathroom last night and bladder scan was 383. Pt. Refused to void overnight.   Drains/Devices: PICC infusing heparin.   Skin: Dressing on BKA clean, dry, and intact. Redness/blanchable on buttocks.   Anticipated DC Date:   Other Important Info: Heparin restarted @ 1550 units/hr. On contact precautions for MRSA.

## 2023-10-15 NOTE — PROGRESS NOTES
"VASCULAR SURGERY PROGRESS NOTE    Subjective:  No acute issues or complaints.  OR yesterday for BKA revision, unable to close incision due to significant edema and erythematous macerated skin secondary to adhesive dressing circumferentially around left lower leg.  Dressing changed overnight by nursing team    Objective:  Intake/Output Summary (Last 24 hours) at 10/13/2023 0730  Last data filed at 10/13/2023 0029  Gross per 24 hour   Intake 0 ml   Output 700 ml   Net -700 ml     PHYSICAL EXAM:  /66 (BP Location: Left arm)   Pulse 83   Temp 98.7  F (37.1  C) (Oral)   Resp 18   Ht 1.676 m (5' 6\")   Wt 118.2 kg (260 lb 9.6 oz)   SpO2 94%   BMI 42.06 kg/m    Resting in bed, awake, alert, appropriate  Dressing in place to left lower extremity clean dry and intact, significant tenderness on palpation around dressing.  No strikethrough noted this morning      ASSESSMENT:  Postop day 8 status post left guillotine BKA , now status post revision on 10/14/2023      PLAN:  -Okay to restart heparin infusion per prior protocol  -Dressing changes needed, do not anticipate further dressing changes today.  If patient does require dressing change please premedicate with p.o. and IV pain medication.  Okay to reinforce dressing as needed  -Wound care and full dressing change by vascular surgery at bedside tomorrow  -cont medical care per medical team    Clover Dorman, DO  Fellow  VASCULAR SURGERY                   "

## 2023-10-15 NOTE — PROGRESS NOTES
Date & Time: 10/14/2023   Surgery/POD#: 0 pf stump debrided  Behavior & Aggression: green  Fall Risk: yes  Orientation:A&O x4  ABNL VS/O2:VSS on RA  ABNL Labs: HGB 8.0  Pain Management: prn iv diladid  Bowel/Bladder: continent, uses purewick when to go.  Drains:none  Diet:mod car  Activity Level: A2 with lift. Turn/repo  Tests/Procedures: as above  Anticipated  DC Date: pending  Significant Information: contact [facility] for MRSA.

## 2023-10-15 NOTE — PROGRESS NOTES
Date & Time: 10/15/2023 at [id number]  Surgery/POD#: 1 pf stump debrided, reinforced the dressing this morning.  Behavior & Aggression: green  Fall Risk: yes  Orientation:A&O x4  ABNL VS/O2:VSS on RA  ABNL Labs: HGB 8..2  Pain Management: prn iv and oral dilaudid and scheduled methadone.  Bowel/Bladder: continent, uses purewick when to go.  Drains:none  Diet:mod car  Activity Level: A2 with lift. Turn/repo  Tests/Procedures: as above  Anticipated  DC Date: pending  Significant Information: R arm PICC running heparin drip is running at 1850 units/hr, lab recheck 8 pm tonight. Chc bath was done. contact [facility] for MRSA.

## 2023-10-15 NOTE — OP NOTE
Preoperative diagnosis: S/p left transtibial guillotine amputation.    Postoperative diagnosis: Same.    Procedure:  1.  Sharp excisional debridement of skin, subcutaneous tissue, muscle and fascia of left transtibial guillotine amputation.  2.  Tibial and fibular resection.    Surgeon: Lan Otto M.D.   Assistant: Clover Dorman M.D.     Anesthesia: General.  Estimated blood loss: About 50 mL.  Complications: None.  Specimens: Short segmental dissection of tibia and fibula were sent for permanent pathology.    Indication for procedure: This is a 78-year-old female who has undergone a previous guillotine transtibial below the knee amputation.  We have been using the wound VAC device.  Our original plan was to bring her back for completion of amputation but she has significant edema and skin breakdown that a formalization of below-knee amputation will not heal.  We will perform a debridement and tibial and fibular osteotomy.    Procedure details: Patient was identified and then taken to the operating room and placed in supine position.  General anesthesia was induced.  Preoperative intravenous antibiotics were administered.  Preprocedure timeout was conducted.    We started by first debriding the skin, subcutaneous tissue, muscle and fascia.  There was a thick layer of biofilm that is accumulated due to the placement of the wound VAC.  This was sharply excised first with a #10 blade and then with the bristled end of a sterile scrub brush.  Then we cut back the tibia about 4 cm and fibula about 4 cm as well to allow for soft tissue coverage and prevent further exposure of the bones.  Adequate hemostasis was noted the wound was irrigated.  We will then covered wet-to-dry with gauze, Kerlix and a stockinette.    Counts of instruments, sponges and needle was noted to be correct.    Patient awakened and extubated and was taken to the recovery room in stable condition.

## 2023-10-15 NOTE — PROVIDER NOTIFICATION
MD Notification    Notified Person: MD    Notified Person Name: kellen    Notification Date/Time: 10/14/23 2145    Notification Interaction: vocera    Purpose of Notification: pt is supposed to get 15 units of tresiba but blood sugar is 132. do you want it held for the night?    Orders Received: hold for tonight    Comments:

## 2023-10-16 ENCOUNTER — APPOINTMENT (OUTPATIENT)
Dept: PHYSICAL THERAPY | Facility: CLINIC | Age: 78
DRG: 617 | End: 2023-10-16
Attending: HOSPITALIST
Payer: COMMERCIAL

## 2023-10-16 LAB
ANION GAP SERPL CALCULATED.3IONS-SCNC: 9 MMOL/L (ref 7–15)
BUN SERPL-MCNC: 18.6 MG/DL (ref 8–23)
CALCIUM SERPL-MCNC: 9.8 MG/DL (ref 8.8–10.2)
CHLORIDE SERPL-SCNC: 103 MMOL/L (ref 98–107)
CREAT SERPL-MCNC: 0.98 MG/DL (ref 0.51–0.95)
DEPRECATED HCO3 PLAS-SCNC: 27 MMOL/L (ref 22–29)
EGFRCR SERPLBLD CKD-EPI 2021: 59 ML/MIN/1.73M2
ERYTHROCYTE [DISTWIDTH] IN BLOOD BY AUTOMATED COUNT: 14.5 % (ref 10–15)
GLUCOSE BLDC GLUCOMTR-MCNC: 161 MG/DL (ref 70–99)
GLUCOSE BLDC GLUCOMTR-MCNC: 181 MG/DL (ref 70–99)
GLUCOSE BLDC GLUCOMTR-MCNC: 187 MG/DL (ref 70–99)
GLUCOSE BLDC GLUCOMTR-MCNC: 193 MG/DL (ref 70–99)
GLUCOSE SERPL-MCNC: 153 MG/DL (ref 70–99)
HCT VFR BLD AUTO: 26.6 % (ref 35–47)
HGB BLD-MCNC: 8.1 G/DL (ref 11.7–15.7)
INR PPP: 1.25 (ref 0.85–1.15)
MCH RBC QN AUTO: 29.9 PG (ref 26.5–33)
MCHC RBC AUTO-ENTMCNC: 30.5 G/DL (ref 31.5–36.5)
MCV RBC AUTO: 98 FL (ref 78–100)
PATH REPORT.COMMENTS IMP SPEC: NORMAL
PATH REPORT.COMMENTS IMP SPEC: NORMAL
PATH REPORT.FINAL DX SPEC: NORMAL
PATH REPORT.GROSS SPEC: NORMAL
PATH REPORT.MICROSCOPIC SPEC OTHER STN: NORMAL
PATH REPORT.RELEVANT HX SPEC: NORMAL
PHOTO IMAGE: NORMAL
PLATELET # BLD AUTO: 179 10E3/UL (ref 150–450)
POTASSIUM SERPL-SCNC: 4.3 MMOL/L (ref 3.4–5.3)
RBC # BLD AUTO: 2.71 10E6/UL (ref 3.8–5.2)
SODIUM SERPL-SCNC: 139 MMOL/L (ref 135–145)
UFH PPP CHRO-ACNC: 0.37 IU/ML
UFH PPP CHRO-ACNC: 0.38 IU/ML
WBC # BLD AUTO: 8.5 10E3/UL (ref 4–11)

## 2023-10-16 PROCEDURE — 99232 SBSQ HOSP IP/OBS MODERATE 35: CPT | Performed by: HOSPITALIST

## 2023-10-16 PROCEDURE — 250N000011 HC RX IP 250 OP 636: Performed by: STUDENT IN AN ORGANIZED HEALTH CARE EDUCATION/TRAINING PROGRAM

## 2023-10-16 PROCEDURE — 250N000011 HC RX IP 250 OP 636: Mod: JZ | Performed by: STUDENT IN AN ORGANIZED HEALTH CARE EDUCATION/TRAINING PROGRAM

## 2023-10-16 PROCEDURE — 80048 BASIC METABOLIC PNL TOTAL CA: CPT | Performed by: INTERNAL MEDICINE

## 2023-10-16 PROCEDURE — 250N000013 HC RX MED GY IP 250 OP 250 PS 637: Performed by: STUDENT IN AN ORGANIZED HEALTH CARE EDUCATION/TRAINING PROGRAM

## 2023-10-16 PROCEDURE — 120N000001 HC R&B MED SURG/OB

## 2023-10-16 PROCEDURE — 97110 THERAPEUTIC EXERCISES: CPT | Mod: GP | Performed by: PHYSICAL THERAPIST

## 2023-10-16 PROCEDURE — 99024 POSTOP FOLLOW-UP VISIT: CPT

## 2023-10-16 PROCEDURE — 85027 COMPLETE CBC AUTOMATED: CPT | Performed by: INTERNAL MEDICINE

## 2023-10-16 PROCEDURE — 85520 HEPARIN ASSAY: CPT | Performed by: HOSPITALIST

## 2023-10-16 PROCEDURE — 85610 PROTHROMBIN TIME: CPT | Performed by: STUDENT IN AN ORGANIZED HEALTH CARE EDUCATION/TRAINING PROGRAM

## 2023-10-16 PROCEDURE — 250N000013 HC RX MED GY IP 250 OP 250 PS 637: Performed by: HOSPITALIST

## 2023-10-16 RX ORDER — WARFARIN SODIUM 2 MG/1
4 TABLET ORAL
Status: COMPLETED | OUTPATIENT
Start: 2023-10-16 | End: 2023-10-16

## 2023-10-16 RX ADMIN — MULTIPLE VITAMINS W/ MINERALS TAB 1 TABLET: TAB at 09:13

## 2023-10-16 RX ADMIN — HYDROMORPHONE HYDROCHLORIDE 4 MG: 2 TABLET ORAL at 06:34

## 2023-10-16 RX ADMIN — INSULIN ASPART 2 UNITS: 100 INJECTION, SOLUTION INTRAVENOUS; SUBCUTANEOUS at 17:13

## 2023-10-16 RX ADMIN — METHADONE HYDROCHLORIDE 10 MG: 10 TABLET ORAL at 14:30

## 2023-10-16 RX ADMIN — ATORVASTATIN CALCIUM 40 MG: 40 TABLET, FILM COATED ORAL at 21:29

## 2023-10-16 RX ADMIN — SENNOSIDES AND DOCUSATE SODIUM 2 TABLET: 50; 8.6 TABLET ORAL at 21:29

## 2023-10-16 RX ADMIN — Medication 1 PACKET: at 11:59

## 2023-10-16 RX ADMIN — HEPARIN SODIUM 1650 UNITS/HR: 10000 INJECTION, SOLUTION INTRAVENOUS at 23:58

## 2023-10-16 RX ADMIN — SENNOSIDES AND DOCUSATE SODIUM 2 TABLET: 50; 8.6 TABLET ORAL at 09:13

## 2023-10-16 RX ADMIN — MICONAZOLE NITRATE: 2 POWDER TOPICAL at 21:30

## 2023-10-16 RX ADMIN — METHADONE HYDROCHLORIDE 5 MG: 5 TABLET ORAL at 21:29

## 2023-10-16 RX ADMIN — HYDROMORPHONE HYDROCHLORIDE 4 MG: 2 TABLET ORAL at 09:54

## 2023-10-16 RX ADMIN — ASPIRIN 81 MG: 81 TABLET, COATED ORAL at 09:14

## 2023-10-16 RX ADMIN — METHADONE HYDROCHLORIDE 5 MG: 5 TABLET ORAL at 06:34

## 2023-10-16 RX ADMIN — METHADONE HYDROCHLORIDE 5 MG: 5 TABLET ORAL at 11:58

## 2023-10-16 RX ADMIN — HYDROMORPHONE HYDROCHLORIDE 0.5 MG: 1 INJECTION, SOLUTION INTRAMUSCULAR; INTRAVENOUS; SUBCUTANEOUS at 10:18

## 2023-10-16 RX ADMIN — Medication 1 PACKET: at 09:14

## 2023-10-16 RX ADMIN — WARFARIN SODIUM 4 MG: 2 TABLET ORAL at 17:12

## 2023-10-16 RX ADMIN — HEPARIN SODIUM 1650 UNITS/HR: 10000 INJECTION, SOLUTION INTRAVENOUS at 09:13

## 2023-10-16 RX ADMIN — MICONAZOLE NITRATE: 2 POWDER TOPICAL at 09:17

## 2023-10-16 RX ADMIN — INSULIN ASPART 1 UNITS: 100 INJECTION, SOLUTION INTRAVENOUS; SUBCUTANEOUS at 11:59

## 2023-10-16 RX ADMIN — INSULIN ASPART 1 UNITS: 100 INJECTION, SOLUTION INTRAVENOUS; SUBCUTANEOUS at 08:19

## 2023-10-16 ASSESSMENT — ACTIVITIES OF DAILY LIVING (ADL)
ADLS_ACUITY_SCORE: 35

## 2023-10-16 NOTE — PROGRESS NOTES
"Care Management Follow Up    Length of Stay (days): 17    Expected Discharge Date: 10/20/2023     Concerns to be Addressed: adjustment to diagnosis/illness, coping/stress, discharge planning     Patient plan of care discussed at interdisciplinary rounds: Yes    Anticipated Discharge Disposition: Transitional Care     Anticipated Discharge Services:    Anticipated Discharge DME:      Patient/family educated on Medicare website which has current facility and service quality ratings:    Education Provided on the Discharge Plan: Yes  Patient/Family in Agreement with the Plan: yes    Referrals Placed by CM/SW:    Private pay costs discussed: Not applicable    Additional Information:  SW went to speak to patient and patients kimberly Leigh at bedside to talk about possible discharge to TCU facilities prior to surgery to close BKA incision. Patient was very confused and immediately became exteremly upset. Patient began crying and daughter Lu had to console her. Patient was under the impression she would stay in the hospital until her surgery. Patient did not understand who would drive her or how she would possibly arrange for transportation to her surgery from a TCU and back and expressed frustration at this type of transport costing her $150 in the past.   Writer explained that we are still looking for TCU placement. Went over some of the pending referrals and went over denials and reasons for denials. Writer asked patient if we might be able to look at a placement closer to the hospital as patient would need to come back in for a surgery. Patient immediately got very upset again as she \"almost  in a TCU facility and does not know anything about the ones around here.\"   Writer explained that we would revisit the conversation later. Writer let Dr Hernandez know about the conversation.     LIZY Sol    "

## 2023-10-16 NOTE — PHARMACY-ANTICOAGULATION SERVICE
Clinical Pharmacy - Warfarin Dosing Consult     Pharmacy has been consulted to manage this patient s warfarin therapy.  Indication: Atrial Fibrillation  Therapy Goal: INR 2-3  Warfarin Prior to Admission: Yes  Warfarin PTA Regimen: 2.5 mg MWSa & 3.75 mg rest of the week.  Last took warfarin 5 mg x 1 on 9/29.    INR   Date Value Ref Range Status   10/16/2023 1.25 (H) 0.85 - 1.15 Final   10/15/2023 1.22 (H) 0.85 - 1.15 Final       Recommend warfarin 4 mg today.  Pharmacy will monitor Linda Loredo daily and order warfarin doses to achieve specified goal.      Please contact pharmacy as soon as possible if the warfarin needs to be held for a procedure or if the warfarin goals change.

## 2023-10-16 NOTE — PLAN OF CARE
date & Time: 10/16/2023 at [id number]  Surgery/POD#: 1 I&D of L BKA  Behavior & Aggression: green  Fall Risk: yes  Orientation:A&O x4  ABNL VS/O2:VSS on RA  ABNL Labs: HGB 8.1,   Pain Management: scheduled methadone. PRN IV and PO dilaudid for dressing change.  Bowel/Bladder: continent  Drains:none  Diet:Mod Carb  Activity Level: A2 with lift  Tests/Procedures: as above  Anticipated  DC Date: pending  Significant Information: Heparin @ 1650; warfarin started @ 6pm tonight for bridging. Dressing change done this AM

## 2023-10-16 NOTE — PROGRESS NOTES
"VASCULAR SURGERY PROGRESS NOTE    Subjective:  No acute issues or complaints.  OR yesterday for BKA revision, unable to close incision due to significant edema and erythematous macerated skin secondary to adhesive dressing circumferentially around left lower leg.     Objective:  Intake/Output Summary (Last 24 hours) at 10/16/2023 1108  Last data filed at 10/16/2023 0600  Gross per 24 hour   Intake 468 ml   Output 750 ml   Net -282 ml     PHYSICAL EXAM:  /60 (BP Location: Left arm)   Pulse 77   Temp 98.3  F (36.8  C) (Oral)   Resp 18   Ht 1.676 m (5' 6\")   Wt 118.2 kg (260 lb 9.6 oz)   SpO2 93%   BMI 42.06 kg/m    Resting in bed, awake, alert, appropriate  Serosang, odorous drainage from dressing-this is unchanged compared to OR, changed dressing at bedside, no signs of infection          ASSESSMENT:  Postop day 9 status post left guillotine BKA , now status post revision on 10/14/2023       PLAN:  -WOC for wound care around the stump and complex wound management  -Please use Vashe for wet-to-dry dressings  -nursing can change dressing-currently gauze with Vashe, followed by ABD and kerlix and stockinette  -ok to transfer to TCU/ARU  -continue medical care per medical team    Discussed pt history, exam, assessment and plan with Dr. Otto of the vascular surgery service, who is in agreement with the above.    Vanita Carter NP  VASCULAR SURGERY                   "

## 2023-10-16 NOTE — PROGRESS NOTES
[facility]    Medicine Progress Note - Hospitalist Service    Date of Admission:  9/29/2023    Assessment & Plan   Linda Loredo is a 78 year old female admitted on 9/29/2023. She was transferred from [facility] for L heel osteomyelitis.  She underwent BKA on 10/7/23 and further debridement on 10/14/23.    L foot decubitus ulcer status debridement by podiatry on 9/26 with multiple organisms, L heel osteomyelitis/cellulitis  Chronic venous stasis  MRSA positive hx  S/P BKA on 10/7/23  Acute blood loss anemia  Brought in 9/23 after welfare check by police found to have L leg swelling and erythema.  Hx venous stasis ulcers. Presents with L heel decubitus ulcer with osteo. Wound with multiple organisms incl pseudomonas, morganella, e faecalis, bordetella, proteus status underwent debridement 9/26 by podiatry.   Blood cultures done on 9/25/2023 have been negative.  ALMA w/ poor flow, podiatry at outside hospital requested transfer where there are vascular services.  Patient is currently on vancomycin and meropenem as per prior recommendations and status post PICC line placement on 9/27 and infectious disease, podiatry and vascular surgery team assisting.  After considering options she decided to proceed with amputation.   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 Please inform patient prior to dressing changes, premedicate with oral dilaudid ~30min prior, then IV dilaudid 15 min prior-->hopefully can transition to just orals in next few days  --  Post-op site cares, anticoagulation, activity restrictions, pain meds per vascular--sounds like closure would be in a few weeks after continued healing.  --  heparin gtt, resuming PTA warfarin on 10/16  --  Monitor  hgb, transfuse if <7  --  planning TCU on discharge     Acute on chronic pain syndrome:   -- Continue methadone 10 mg daily midday and 5 mg TID  -- IV and PO dilaudid prn for breakthrough pain (doses increased 10/10)  -- Patient was also evaluated by Psych, no concern for depression, they encouraged patient to continue to use her coping skills to adjust after this surgery      Urine culture positive for E faecalis, Staph aureus and E. Coli  -- UA done at outside hospital on 9/23 showed above, she completed >10 day course of antibiotics with vancomycin, ceftriaxone, meropenem on 10/8     DM II:  [PTA degludec 35 units at HS, metformin 2000 mg daily, ozempic]  A1c 7.0 9/23/23. Has been on insulin pump in the past but this was changed to tresiba 8/2023.  -- hold metformin, Ozempic   -- medium resistance SSI  -- Patient had been receiving Tresiba 15 units at bedtime  -- for now, continue Tresiba 12 units nightly     Atrial fibrillation  HTN  HLD:  [PTA- warfarin, simvastatin 20 mg daily, lisinopril 5 mg daily]  -- Continue with statin  -- Continue heparin gtt, resume warfarin on 10/16     CKD III:  --Baseline creatinine 1-1.2.   --Stable, monitor occasionally     Failure to thrive:  As above, called by police for welfare check. ? Safety at home. SW had seen at [facility].      Obesity:  -- Complicates care     BERLIN:  -- continue home cpap          Diet: Snacks/Supplements Adult: Glucerna; Between Meals  Consistent Carbohydrate Diet Moderate Consistent Carb (60 g CHO per Meal) Diet    DVT Prophylaxis: heparin gtt, warfarin  Braga Catheter: Not present  Lines: PRESENT      PICC 10/09/23 Triple Lumen Right Basilic-Site Assessment: WDL      Cardiac Monitoring: None  Code Status: Full Code      Clinically Significant Risk Factors               # Coagulation Defect: INR = 1.25 (Ref range: 0.85 - 1.15) and/or PTT = N/A, will monitor for bleeding    # Hypertension: Noted on problem list       # DMII: A1C = 7.0 % (Ref range: <5.7 %)  "within past 6 months   # Severe Obesity: Estimated body mass index is 42.06 kg/m  as calculated from the following:    Height as of this encounter: 1.676 m (5' 6\").    Weight as of this encounter: 118.2 kg (260 lb 9.6 oz).             Disposition Plan      Expected Discharge Date: 10/20/2023    Discharge Delays: Procedure Pending (enter procedure & time in comments)  Destination: inpatient rehabilitation facility  Discharge Comments: amputation closure in a few weeks, waiting for more healing  TCU once pain controlled with only orals            Jennifer Hernandez DO  Hospitalist Service  [facility]  Securely message with [employer] (more info)  Text page via [employer] Paging/Directory   ______________________________________________________________________    Interval History   Patient seen and examined. She is doing ok. Had wound dressing changed at bedside on 10/16, it was uncomfortable, but she tolerated fairly well with med pre-treatment. Will need to see if she is able to switch to orals only in next few days with dressing treatments. Starting warfarin bridge tonight. Updated patient/RN via phone. Not had a bowel movement, waiting for a larger commode to arrive to bedside. Discussed care plan with vascular.    Physical Exam   Vital Signs: Temp: 98.3  F (36.8  C) Temp src: Oral BP: 133/60 Pulse: 77   Resp: 18 SpO2: 93 % O2 Device: None (Room air)    Weight: 260 lbs 9.6 oz    Constitutional: Awake, alert, cooperative, mild distress with wound dressing change  Respiratory: Clear to auscultation bilaterally, no crackles or wheezing  Cardiovascular: Regular rate and rhythm, normal S1 and S2, and no murmur noted  GI: Normal bowel sounds, soft, non-distended, non-tender  Skin/Integumen: No rashes, no cyanosis, no edema  Other:  Left lower extremity amputation, serosang drainage. Deep red skin change around amputation.    Medical Decision Making       35 MINUTES SPENT BY ME on the date of service doing " chart review, history, exam, documentation & further activities per the note.      Data     I have personally reviewed the following data over the past 24 hrs:    8.5  \   8.1 (L)   / 179     139 103 18.6 /  187 (H)   4.3 27 0.98 (H) \     INR:  1.25 (H) PTT:  N/A   D-dimer:  N/A Fibrinogen:  N/A       Imaging results reviewed over the past 24 hrs:   No results found for this or any previous visit (from the past 24 hour(s)).

## 2023-10-17 ENCOUNTER — APPOINTMENT (OUTPATIENT)
Dept: PHYSICAL THERAPY | Facility: CLINIC | Age: 78
DRG: 617 | End: 2023-10-17
Attending: HOSPITALIST
Payer: COMMERCIAL

## 2023-10-17 LAB
ANION GAP SERPL CALCULATED.3IONS-SCNC: 7 MMOL/L (ref 7–15)
BUN SERPL-MCNC: 18.9 MG/DL (ref 8–23)
CALCIUM SERPL-MCNC: 9.9 MG/DL (ref 8.8–10.2)
CHLORIDE SERPL-SCNC: 102 MMOL/L (ref 98–107)
CREAT SERPL-MCNC: 1.05 MG/DL (ref 0.51–0.95)
DEPRECATED HCO3 PLAS-SCNC: 30 MMOL/L (ref 22–29)
EGFRCR SERPLBLD CKD-EPI 2021: 54 ML/MIN/1.73M2
ERYTHROCYTE [DISTWIDTH] IN BLOOD BY AUTOMATED COUNT: 14.3 % (ref 10–15)
GLUCOSE BLDC GLUCOMTR-MCNC: 136 MG/DL (ref 70–99)
GLUCOSE BLDC GLUCOMTR-MCNC: 141 MG/DL (ref 70–99)
GLUCOSE BLDC GLUCOMTR-MCNC: 148 MG/DL (ref 70–99)
GLUCOSE BLDC GLUCOMTR-MCNC: 159 MG/DL (ref 70–99)
GLUCOSE SERPL-MCNC: 156 MG/DL (ref 70–99)
HCT VFR BLD AUTO: 24.7 % (ref 35–47)
HGB BLD-MCNC: 7.7 G/DL (ref 11.7–15.7)
INR PPP: 1.3 (ref 0.85–1.15)
MAGNESIUM SERPL-MCNC: 1.7 MG/DL (ref 1.7–2.3)
MCH RBC QN AUTO: 30.6 PG (ref 26.5–33)
MCHC RBC AUTO-ENTMCNC: 31.2 G/DL (ref 31.5–36.5)
MCV RBC AUTO: 98 FL (ref 78–100)
PHOSPHATE SERPL-MCNC: 2.6 MG/DL (ref 2.5–4.5)
PLATELET # BLD AUTO: 173 10E3/UL (ref 150–450)
POTASSIUM SERPL-SCNC: 4.1 MMOL/L (ref 3.4–5.3)
RBC # BLD AUTO: 2.52 10E6/UL (ref 3.8–5.2)
SODIUM SERPL-SCNC: 139 MMOL/L (ref 135–145)
UFH PPP CHRO-ACNC: 0.42 IU/ML
WBC # BLD AUTO: 7.3 10E3/UL (ref 4–11)

## 2023-10-17 PROCEDURE — 250N000011 HC RX IP 250 OP 636: Performed by: STUDENT IN AN ORGANIZED HEALTH CARE EDUCATION/TRAINING PROGRAM

## 2023-10-17 PROCEDURE — 250N000013 HC RX MED GY IP 250 OP 250 PS 637: Performed by: STUDENT IN AN ORGANIZED HEALTH CARE EDUCATION/TRAINING PROGRAM

## 2023-10-17 PROCEDURE — G0463 HOSPITAL OUTPT CLINIC VISIT: HCPCS

## 2023-10-17 PROCEDURE — 85027 COMPLETE CBC AUTOMATED: CPT | Performed by: HOSPITALIST

## 2023-10-17 PROCEDURE — 84100 ASSAY OF PHOSPHORUS: CPT | Performed by: HOSPITALIST

## 2023-10-17 PROCEDURE — 80048 BASIC METABOLIC PNL TOTAL CA: CPT | Performed by: HOSPITALIST

## 2023-10-17 PROCEDURE — 250N000013 HC RX MED GY IP 250 OP 250 PS 637: Performed by: HOSPITALIST

## 2023-10-17 PROCEDURE — 120N000001 HC R&B MED SURG/OB

## 2023-10-17 PROCEDURE — 250N000011 HC RX IP 250 OP 636: Mod: JZ | Performed by: STUDENT IN AN ORGANIZED HEALTH CARE EDUCATION/TRAINING PROGRAM

## 2023-10-17 PROCEDURE — 97530 THERAPEUTIC ACTIVITIES: CPT | Mod: GP | Performed by: PHYSICAL THERAPIST

## 2023-10-17 PROCEDURE — 83735 ASSAY OF MAGNESIUM: CPT | Performed by: HOSPITALIST

## 2023-10-17 PROCEDURE — 85610 PROTHROMBIN TIME: CPT | Performed by: STUDENT IN AN ORGANIZED HEALTH CARE EDUCATION/TRAINING PROGRAM

## 2023-10-17 PROCEDURE — 85520 HEPARIN ASSAY: CPT | Performed by: HOSPITALIST

## 2023-10-17 PROCEDURE — 99233 SBSQ HOSP IP/OBS HIGH 50: CPT | Performed by: HOSPITALIST

## 2023-10-17 PROCEDURE — 999N000040 HC STATISTIC CONSULT NO CHARGE VASC ACCESS

## 2023-10-17 RX ORDER — WARFARIN SODIUM 2 MG/1
6 TABLET ORAL
Status: COMPLETED | OUTPATIENT
Start: 2023-10-17 | End: 2023-10-17

## 2023-10-17 RX ADMIN — HEPARIN SODIUM 1650 UNITS/HR: 10000 INJECTION, SOLUTION INTRAVENOUS at 14:55

## 2023-10-17 RX ADMIN — TORSEMIDE 15 MG: 10 TABLET ORAL at 12:06

## 2023-10-17 RX ADMIN — HYDROMORPHONE HYDROCHLORIDE 0.5 MG: 1 INJECTION, SOLUTION INTRAMUSCULAR; INTRAVENOUS; SUBCUTANEOUS at 10:04

## 2023-10-17 RX ADMIN — SENNOSIDES AND DOCUSATE SODIUM 2 TABLET: 50; 8.6 TABLET ORAL at 08:48

## 2023-10-17 RX ADMIN — HYDROMORPHONE HYDROCHLORIDE 0.5 MG: 1 INJECTION, SOLUTION INTRAMUSCULAR; INTRAVENOUS; SUBCUTANEOUS at 14:07

## 2023-10-17 RX ADMIN — Medication 1 PACKET: at 08:49

## 2023-10-17 RX ADMIN — METHADONE HYDROCHLORIDE 10 MG: 10 TABLET ORAL at 14:58

## 2023-10-17 RX ADMIN — ASPIRIN 81 MG: 81 TABLET, COATED ORAL at 08:49

## 2023-10-17 RX ADMIN — INSULIN ASPART 1 UNITS: 100 INJECTION, SOLUTION INTRAVENOUS; SUBCUTANEOUS at 08:48

## 2023-10-17 RX ADMIN — Medication 1 PACKET: at 12:44

## 2023-10-17 RX ADMIN — INSULIN ASPART 1 UNITS: 100 INJECTION, SOLUTION INTRAVENOUS; SUBCUTANEOUS at 12:44

## 2023-10-17 RX ADMIN — METHADONE HYDROCHLORIDE 5 MG: 5 TABLET ORAL at 12:06

## 2023-10-17 RX ADMIN — SENNOSIDES AND DOCUSATE SODIUM 2 TABLET: 50; 8.6 TABLET ORAL at 21:04

## 2023-10-17 RX ADMIN — METHADONE HYDROCHLORIDE 5 MG: 5 TABLET ORAL at 21:04

## 2023-10-17 RX ADMIN — INSULIN ASPART 1 UNITS: 100 INJECTION, SOLUTION INTRAVENOUS; SUBCUTANEOUS at 18:16

## 2023-10-17 RX ADMIN — MULTIPLE VITAMINS W/ MINERALS TAB 1 TABLET: TAB at 08:49

## 2023-10-17 RX ADMIN — MICONAZOLE NITRATE: 2 POWDER TOPICAL at 21:04

## 2023-10-17 RX ADMIN — WARFARIN SODIUM 6 MG: 2 TABLET ORAL at 18:51

## 2023-10-17 RX ADMIN — HYDROMORPHONE HYDROCHLORIDE 4 MG: 2 TABLET ORAL at 09:44

## 2023-10-17 RX ADMIN — MICONAZOLE NITRATE: 2 POWDER TOPICAL at 08:49

## 2023-10-17 RX ADMIN — METHADONE HYDROCHLORIDE 5 MG: 5 TABLET ORAL at 06:01

## 2023-10-17 RX ADMIN — ATORVASTATIN CALCIUM 40 MG: 40 TABLET, FILM COATED ORAL at 21:04

## 2023-10-17 ASSESSMENT — ACTIVITIES OF DAILY LIVING (ADL)
ADLS_ACUITY_SCORE: 35

## 2023-10-17 NOTE — PROGRESS NOTES
[facility]    Medicine Progress Note - Hospitalist Service    Date of Admission:  9/29/2023    Assessment & Plan   Linad Loredo is a 78 year old female admitted on 9/29/2023. She was transferred from [facility] for L heel osteomyelitis.  She underwent BKA on 10/7/23 and further debridement on 10/14/23.    L foot decubitus ulcer status debridement by podiatry on 9/26 with multiple organisms, L heel osteomyelitis/cellulitis  Chronic venous stasis  MRSA positive hx  S/P BKA on 10/7/23  Acute blood loss anemia  Brought in 9/23 after welfare check by police found to have L leg swelling and erythema.  Hx venous stasis ulcers. Presents with L heel decubitus ulcer with osteo. Wound with multiple organisms incl pseudomonas, morganella, e faecalis, bordetella, proteus status underwent debridement 9/26 by podiatry.   Blood cultures done on 9/25/2023 have been negative.  ALMA w/ poor flow, podiatry at outside hospital requested transfer where there are vascular services.  Patient is currently on vancomycin and meropenem as per prior recommendations and status post PICC line placement on 9/27 and infectious disease, podiatry and vascular surgery team assisting.  After considering options she decided to proceed with amputation.   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 Please inform patient prior to dressing changes, premedicate with oral dilaudid ~30min prior, then IV dilaudid 15 min prior-->hopefully can transition to just orals in next few days  --  Post-op site cares, anticoagulation, activity restrictions, pain meds per vascular--sounds like closure is hopeful for about a month after continued healing.  --  heparin gtt, resuming PTA warfarin on 10/16  --   Monitor hgb, transfuse if <7  -- resume low dose torsemide 15mg once daily (instead of BID) and monitor  --  planning TCU on discharge     Acute on chronic pain syndrome:   -- Continue methadone 10 mg daily midday and 5 mg TID  -- IV and PO dilaudid prn for breakthrough pain (doses increased 10/10)  -- Patient was also evaluated by Psych, no concern for depression, they encouraged patient to continue to use her coping skills to adjust after this surgery      Urine culture positive for E faecalis, Staph aureus and E. Coli  -- UA done at outside hospital on 9/23 showed above, she completed >10 day course of antibiotics with vancomycin, ceftriaxone, meropenem on 10/8     DM II:  [PTA degludec 35 units at HS, metformin 2000 mg daily, ozempic]  A1c 7.0 9/23/23. Has been on insulin pump in the past but this was changed to tresiba 8/2023.  -- hold metformin, Ozempic   -- medium resistance SSI  -- Patient had been receiving Tresiba 15 units at bedtime  -- for now, continue Tresiba 12 units nightly     Atrial fibrillation  HTN  HLD:  [PTA- warfarin, simvastatin 20 mg daily, lisinopril 5 mg daily]  -- Continue with statin  -- Continue heparin gtt, resumed warfarin on 10/16--await INR at goal to stop heparin     CKD III:  --Baseline creatinine 1-1.2.   --Stable, monitor occasionally     Failure to thrive:  As above, called by police for welfare check. ? Safety at home. SW had [facility].      Obesity:  -- Complicates care     BERLIN:  -- continue home cpap          Diet: Snacks/Supplements Adult: Glucerna; Between Meals  Consistent Carbohydrate Diet Moderate Consistent Carb (60 g CHO per Meal) Diet    DVT Prophylaxis: heparin gtt, warfarin  Braga Catheter: Not present  Lines: PRESENT      PICC 10/09/23 Triple Lumen Right Basilic-Site Assessment: WDL      Cardiac Monitoring: None  Code Status: Full Code      Clinically Significant Risk Factors                  # Hypertension: Noted on problem list       # DMII: A1C = 7.0 % (Ref  "range: <5.7 %) within past 6 months   # Severe Obesity: Estimated body mass index is 42.06 kg/m  as calculated from the following:    Height as of this encounter: 1.676 m (5' 6\").    Weight as of this encounter: 118.2 kg (260 lb 9.6 oz).             Disposition Plan      Expected Discharge Date: 10/20/2023    Discharge Delays: Procedure Pending (enter procedure & time in comments)  Destination: inpatient rehabilitation facility  Discharge Comments: amputation closure in a few weeks, waiting for more healing  TCU once pain controlled with only orals            Jennifer Hernandez DO  Hospitalist Service  [facility]  Securely message with [employer] (more info)  Text page via [facility] Paging/Directory   ______________________________________________________________________    Interval History   Patient seen and examined. Spent 30 minutes at bedside discussing plan of care with patient and daughter Lu via phone. Pat has concerns about how things would work if she went to a TCU that was far away and she needed to show up for follow-up appts, including cost of transport to these appointments. Appreciate vascular's plans to update patient today. Otherwise, patient is feeling well. She does note that her left stump did have a lot of drainage and would need dressing change today.  Discussed patient with RN, YUDY, vascular.    Physical Exam   Vital Signs: Temp: 97.8  F (36.6  C) Temp src: Oral BP: 138/69 Pulse: 86   Resp: 18 SpO2: 91 % O2 Device: None (Room air)    Weight: 260 lbs 9.6 oz    Constitutional: Awake, alert, cooperative, no acute distress  Respiratory: Clear to auscultation bilaterally, no crackles or wheezing  Cardiovascular: Regular rate and rhythm, normal S1 and S2, and no murmur noted  GI: Normal bowel sounds, soft, non-distended, non-tender  Skin/Integumen: No rashes, no cyanosis, no edema  Other:  Left lower extremity amputation, serosang drainage noted    Medical Decision Making       50 " MINUTES SPENT BY ME on the date of service doing chart review, history, exam, documentation & further activities per the note.      Data     I have personally reviewed the following data over the past 24 hrs:    7.3  \   7.7 (L)   / 173     139 102 18.9 /  148 (H)   4.1 30 (H) 1.05 (H) \     INR:  1.30 (H) PTT:  N/A   D-dimer:  N/A Fibrinogen:  N/A       Imaging results reviewed over the past 24 hrs:   No results found for this or any previous visit (from the past 24 hour(s)).

## 2023-10-17 NOTE — PROGRESS NOTES
"[facility] Nurse Inpatient Assessment     Consulted for: 9/30 Wound diabetic foot ulcer left foot (now s/p BKA and wound vac placement); 10/2 Wound right buttocks.     Summary:   1) Left heel ulcer: s/p BKA 10/7, vac removed and now doing Vashe dressing changes. No plans for closure for about a month. Deroofed blisters to sreekanth-wound skin. Patient needs to be premedicated so contact RN to plan.    The following not assesed on 10/17, patient declined after left BKA wound care.   2) BL legs: Chronic venous stasis with superficial healing wounds- resolved 10/17  3) Buttock/Coccyx: Mixture of friction and moisture and pressure, very painful and entire perineal area/ groin very macerated 10/11, bariatric low air loss mattress ordered   4) Left IT: Unstageable ulcer present on admission, stable 10/11    Patient History (according to provider note(s):      \"78 year old female admitted on 9/29/2023. She presents as a transfer from [facility] for L heel osteomyelitis \"    Assessment:      Areas visualized during today's visit: Heels  and Lower extremities left     Wound location: Left BKA   Last photo: 10/17/23      Left upper lateral thigh      Left lateral thigh      Wound due to: Pressure Injury and Diabetic Ulcer, now surgical wound/amp  Wound history/plan of care: s/p BKA, saritha 10/7  Wound base:  ~ pale pink and white non-granular tissue, strings of adherent fibrinous tissue, some adipose tissue, areas of red, bleeding friable tissue at center of wound bed.  ~Left lateral thigh wounds are pink-red moist tissue from deroofed blisters     Palpation of the wound bed:  spongy and fleshy     Drainage: moderate     Description of drainage: serous, serosanguinous, yellow, and tan     Measurements (length x width x depth, in cm): 18  x 16  x 2cm to center (stable); left upper lateral thigh 8 x 5 x < 0.1 cm; left lateral thigh 6 x 8 x < 0.1 cm     Tunneling: N/A     Undermining: N/A  Periwound " skin: Edematous and Skin stripping,       Color:  red/pink         Temperature: normal   Odor: moderate  Pain: severe, Irritable or crying out at intervals, tension to hands, feet and body, facial expression of distress, and during dressing change,  unable to describe  Pain interventions prior to dressing change: oral dilaudid, IV dilaudid, slow and gentle cares , and distraction, pt reports this dressing change more tolerable than the vac  Treatment goal: Drainage control, Infection control/prevention, Maintain (prevention of deterioration), Protection, and Prepare wound bed for flap/graft  STATUS: stable  Supplies ordered: gathered and discussed with patient ,    Wound location: Right posterior lower leg    Last photo: 10/11/23      Wound due to: Venous Ulcer  Wound history/plan of care: unclear  Chronic non healing ulcer, chronic edema with diabetes    Wound base: drying flaking white-yellow-pink tissue and serous scabbing     Palpation of the wound bed: normal      Drainage: scant     Description of drainage: serosanguinous     Measurements (length x width x depth, in cm): 1.5  x 2  x  0.1 cm      Tunneling: N/A     Undermining: N/A  Periwound skin: Intact, Edematous, and Erythema- blanchable      Color: pink      Temperature: normal   Odor: none  Pain: denies , none  Pain interventions prior to dressing change: N/A  Treatment goal: Heal  and Infection control/prevention  STATUS: improving  Supplies ordered: at bedside, supplies stored on unit, and discussed with RN      Wound location: Left lateral LE  Last photo: 10/6/23        Wound due to: Venous Ulcer  Wound history/plan of care: Chronic non healing ulcer, chronic edema with diabetes  Wound base: pink, flaking epidermis     Palpation of the wound bed: normal      Drainage: scant     Description of drainage: serosanguinous     Measurements (length x width x depth, in cm): 2  x 0.8  x  0.1 cm (not measured 10/11)     Tunneling: N/A     Undermining:  N/A  Periwound skin: Dry/scaly, Edematous       Color: normal and consistent with surrounding tissue      Temperature: normal   Odor: none  Pain: denies , none  Pain interventions prior to dressing change: N/A  Treatment goal: Heal   STATUS: resurfaced  Supplies ordered: at bedside      Wound location: Coccyx/buttock  Last photo: 10-11-23      Wound due to: Friction and Incontinence Associated Dermatitis (IAD)  Wound history/plan of care: Patient with wounds on admission. Pt reports that her buttocks and sacrum are painful. Found with Mepilex Sacral in place 10/11, was painful to remove and skin very macerated underneath, pt requesting to not reapply this type of dressing today   Wound base: patchy areas of moist red tissue, evidence of chronic tissue injury to bilateral buttocks and ITs     Palpation of the wound bed: normal      Drainage: moderate,  serosanguinous and bloody     Description of drainage: serosanguinous and bloody     Measurements (length x width x depth, in cm):  10 x 6 x 0.1 cm (total area spanning buttocks)     Tunneling: N/A     Undermining: N/A  Periwound skin: Dry/scaly and Erythema- blanchable, flaking peeling epidermis, maceration especially in gluteal cleft and perineal folds; mild red denudement to inner labia under purewick      Color: dusky and purple      Temperature: normal   Odor: none  Pain: mild, tender  Pain interventions prior to dressing change: slow and gentle cares   Treatment goal: Heal , Drainage control, Decrease moisture, and Protection  STATUS: improving slowly  Supplies ordered: at bedside, supplies stored on unit, discussed with RN, and discussed with patient     Wound location: Left IT    Last photo: 10-11-23      Wound due to: Mixed Etiology: moisture, friction, pressure  Wound history/plan of care: Patient refuses to have HOB lowered. Wound covered with Triad. Patient has three incontinence pads under her as well as Purewick in place.    Wound base: Yellow /pink/ red  marbled tissue, yellow tissue is adherent, fibrinous     Palpation of the wound bed: normal      Drainage: scant     Description of drainage: serosanguinous     Measurements (length x width x depth, in cm): 1.2  x 2  x  0.2 cm      Tunneling: N/A     Undermining: N/A  Periwound skin: Dry/scaly and Erythema- blanchable      Color: dusky and pink      Temperature: normal   Odor: none  Pain: mild, stinging  Pain interventions prior to dressing change: slow and gentle cares   Treatment goal: Heal , Drainage control, Decrease moisture, and Protection  STATUS: stable  Supplies ordered: at bedside, supplies stored on unit, and discussed with RN        Treatment Plan:     Bilateral lower legs: every 3 days and prn:  Cleanse any open areas with wound cleanser.  Cover with Mepilex.   Mepilex to right heel for protection prn.   Elevate legs; float right heel, cushion left BKA.     Buttock/ left IT/ perineal cares: BID and prn:  Cleanse with generous amount Marisol perineal lotion and soft dry cloths.  Spray the Marisol directly on the skin.  Ensure cleansing to base of all folds.  Do not need to remove all old paste every time, only outer soiled layers.   Apply antifungal powder to all skin folds.  Spread the powder with a dry cloth - this helps get a thin, even layer.    Apply Triad paste to buttock wounds and left IT wound, and any other open areas.    Leave open to air or apply Mepilex LITE over the buttock wounds as needed, to help keep the paste and wounds from sticking to the chux.     Place a flat layer of dry cloths or InterDry along anterior groin and pannus folds as needed to help prevent skin on skin chafing.     Trial using the PureWick only when needs to void.   PIP measures.  Bariatric low air loss mattress.      Right BKA site: Daily and PRN for saturation due to copious drainage  Use Vashe soaked gauze fluffs to cleanse sreekanth-wound skin out to about 4 in. Let dry.  Place Vashe soaked gauze fluffs over wound bed for  "about 2 minutes to cleanse and debride wound bed.  Swab sreekanth-wound skin with no-sting barrier and let dry completely.   Cover two open areas on lateral thigh with Xeroform gauze. Cut to approximately fit wound bed.  Cover stump wound with Adaptic.  Use Vashe soaked Kerlix roll to cover wound bed.  Use two ABD pads to cover BKA stump. Secure with Medipore tape.  Wrap with Kerlix and secure with Medipore tape.    Pressure Injury Prevention (PIP) Plan:  -If patient is declining pressure injury prevention interventions: Explore reason why and address patient's concerns, Educate on pressure injury risk and prevention intervention(s), If patient is still declining, document \"informed refusal\" , and Ensure Care team is aware ( provider, charge nurse, etc)  -Mattress: Follow bed algorithm, reassess daily and order specialty mattress, if indicated.  -HOB: Maintain at or below 30 degrees, unless contraindicated  -Repositioning in bed: Every 1-2 hours , Left/right positioning; avoid supine, and Raise foot of bed prior to raising head of bed, to reduce patient sliding down (shear)  -Heels: Keep elevated off mattress  -Protective Dressing:  Mepilex to R heel  -Positioning Equipment:  pillows  -Chair positioning: Chair cushion (#[id number]) , Repositions self: patient to shift weight every 15 minutes, and Do NOT use a donut for sitting (this increases pressure to smaller area and creates a higher potential for injury)    -Moisture Management: Perineal cleansing /protection: Follow Incontinence Protocol, Avoid brief in bed, Clean and dry skin folds with bathing , and Moisturize dry skin  -Under Devices: Inspect skin under all medical devices during skin inspection , Ensure tubes are stabilized without tension, and Ensure patient is not lying on medical devices or equipment when repositioned     Orders: Reviewed, Written, and Updated    RECOMMEND PRIMARY TEAM ORDER: None, at this time  Education provided: plan of care, wound progress, " "Moisture management, Hygiene, and Off-loading pressure  Discussed plan of care with: Patient and Nurse   WOC nurse follow-up plan: weekly and prn   Notify WOC if wound(s) deteriorate.  Nursing to notify the Provider(s) and re-consult the WOC Nurse if new skin concern.    DATA:     Current support surface: Standard  Low air loss (BRISA pump, Isolibrium, Pulsate, skin guard, etc)  Containment of urine/stool: Incontinence Protocol, Incontinent pad in bed, and Purewick external catheter   BMI: Body mass index is 42.06 kg/m .   Active diet order: Orders Placed This Encounter      Consistent Carbohydrate Diet Moderate Consistent Carb (60 g CHO per Meal) Diet     Output: I/O last 3 completed shifts:  In: 1220 [P.O.:1220]  Out: 650 [Urine:650]     Labs:   Recent Labs   Lab 10/17/23  0602   HGB 7.7*   INR 1.30*   WBC 7.3     Pressure injury risk assessment:   Sensory Perception: 3-->slightly limited  Moisture: 3-->occasionally moist  Activity: 1-->bedfast  Mobility: 2-->very limited  Nutrition: 3-->adequate  Friction and Shear: 2-->potential problem  Suresh Score: 14    Bettye Allison RN, CWON  Please contact via [employer] at name or group \"[facility] nurse\"- M-F 8A-4P  Leave  @ *[phone] for non-urgent needs. Checked occasionally M-F.   "

## 2023-10-17 NOTE — PROGRESS NOTES
"SPIRITUAL HEALTH SERVICES - Progress Note   SH Gen Surg  Saw pt Lindachong Loredo per follow- up at patient request.     Patient/Family Understanding of Illness and Goals of Care - Pat expressed confusion over her plan of care and a desire for more communication about what to expect.     Distress and Loss - Pat shared stories about her previous experience in a TCU facility that was not positive and \"fear\" over what another facility may be like in the future, if that is part of her care plan.     Strengths, Coping, and Resources -   Pat names her daughter, Lu, as her primary support.  Pat keeps records of her care in a notebook as a way to understand what can feel like an \"overwhelming\" amount of information.     Meaning, Beliefs, and Spirituality - Pat relies on her spirituality to \"carry on\" during tough times. She \"prays privately all the time.\" At  her request, we also shared a prayer together.    Plan of Care - I was able to consult with the Unit Care Coordinator regarding Margaret's concerns and will continue to follow up with this patient during her hospitalization. [facility] also remains available upon request.       Loretta Cat  Chaplain Resident    [facility] routine referrals *[phone]    [facility] available 24/7 for emergent requests/referrals, either by paging the on-call  or by entering an ASAP/STAT consult in Epic (this will also page the on-call ).   "

## 2023-10-17 NOTE — PLAN OF CARE
Pt is AOx4, anxious at times, CMS baseline, L popliteal pulse +1, neuropathy baseline, pain is managed with methadone, refuses repositioning at times, edu provided, heparin running, assist x2 with lift, pt requested purewick when voiding, dressing LLE intact, PICC patent, ADLs encouraged. Pending progress.

## 2023-10-17 NOTE — PROGRESS NOTES
Care Management Follow Up    Length of Stay (days): 18    Expected Discharge Date: 10/20/2023     Concerns to be Addressed: adjustment to diagnosis/illness, coping/stress, discharge planning     Patient plan of care discussed at interdisciplinary rounds: No    Anticipated Discharge Disposition: Transitional Care     Anticipated Discharge Services:    Anticipated Discharge DME:      Patient/family educated on Medicare website which has current facility and service quality ratings:    Education Provided on the Discharge Plan: Yes  Patient/Family in Agreement with the Plan: yes    Referrals Placed by CM/SW:    Private pay costs discussed: Not applicable    Additional Information:  Writer spoke with care coordinator regarding pt's case. Care coordinator spoke with Dr. Hernandez who will go and see pt and pt's daughter.   Writer spoke with Dr. Hernandez who is planning on having Vascular NP speak to pt and pt's daughter regarding their medical plan for pt.  instructed vascular NP to notify writer when they have discussed medical plan with pt and pt's daughter so that writer may follow up after.     JOHAN EscuderoW  Social Work  [facility]

## 2023-10-17 NOTE — PLAN OF CARE
Date & Time: 10/17/2023 at 0641-0099  Surgery/POD#: I&D of L BKA  Behavior & Aggression: green  Fall Risk: yes  Orientation:A&O x4  ABNL VS/O2:VSS on RA  ABNL Labs: Hemoglobin 7.7  Pain Management: scheduled methadone. PRN IV and PO dilaudid for PT and dressing change.  Bowel/Bladder: continent  Drains:none  Diet:Mod Carb  Activity Level: A2 with lift  Tests/Procedures:   Anticipated  DC Date: pending  Significant Information: Heparin @ 1650. Dressing change done this afternoon by WOC. Anti Xa recheck tomorrow am

## 2023-10-17 NOTE — PLAN OF CARE
Goal Outcome Evaluation:      Plan of Care Reviewed With: patient    Overall Patient Progress: no changeOverall Patient Progress: no change     Date & Time: 10/16/23 9163-9570  Surgery/POD#: 10 L BKA, POD 3 debridement of left below the knee amputation  Fall Risk: Yes  Orientation: A&Ox4  ABNL VS/O2: VSS on RA  ABNL Labs: Hgb 7.7,  GFR 54. , 156. INR 1.30. Hep XA 0.42 this AM.  Pain Management: Scheduled Methadone, requires PO and IV dilaudid prior to dressing changes.  Bowel/Bladder: Intermittent purewick use, refused bladder scan, states she tends to wait long periods between voiding, large bladder incontinence noted at 630am. No BM this shift  Drains: PICC infusing hep gtt at 16.5mL/hr  Diet: NPO  Activity Level: A2Lift  Tests/Procedures: N/A   Anticipated  DC Date: Pending, possible TCU prior to completion of closure of BKA per note.  Significant Information: Contact ISO. Vascular following. Excoriation continues on buttocks. +2 edema in RLE with BLE ulcers remain. Patient noncompliant despite education by writer. Moderate drainage noted to L BKA dressing.

## 2023-10-18 LAB
ANION GAP SERPL CALCULATED.3IONS-SCNC: 9 MMOL/L (ref 7–15)
BUN SERPL-MCNC: 19.7 MG/DL (ref 8–23)
CALCIUM SERPL-MCNC: 10.1 MG/DL (ref 8.8–10.2)
CHLORIDE SERPL-SCNC: 102 MMOL/L (ref 98–107)
CREAT SERPL-MCNC: 1.03 MG/DL (ref 0.51–0.95)
DEPRECATED HCO3 PLAS-SCNC: 29 MMOL/L (ref 22–29)
EGFRCR SERPLBLD CKD-EPI 2021: 55 ML/MIN/1.73M2
GLUCOSE BLDC GLUCOMTR-MCNC: 127 MG/DL (ref 70–99)
GLUCOSE BLDC GLUCOMTR-MCNC: 138 MG/DL (ref 70–99)
GLUCOSE BLDC GLUCOMTR-MCNC: 161 MG/DL (ref 70–99)
GLUCOSE SERPL-MCNC: 139 MG/DL (ref 70–99)
INR PPP: 1.39 (ref 0.85–1.15)
POTASSIUM SERPL-SCNC: 3.7 MMOL/L (ref 3.4–5.3)
SODIUM SERPL-SCNC: 140 MMOL/L (ref 135–145)
UFH PPP CHRO-ACNC: 0.46 IU/ML

## 2023-10-18 PROCEDURE — 99024 POSTOP FOLLOW-UP VISIT: CPT

## 2023-10-18 PROCEDURE — 99233 SBSQ HOSP IP/OBS HIGH 50: CPT | Performed by: HOSPITALIST

## 2023-10-18 PROCEDURE — 99418 PROLNG IP/OBS E/M EA 15 MIN: CPT | Performed by: NURSE PRACTITIONER

## 2023-10-18 PROCEDURE — 120N000001 HC R&B MED SURG/OB

## 2023-10-18 PROCEDURE — 250N000013 HC RX MED GY IP 250 OP 250 PS 637: Performed by: STUDENT IN AN ORGANIZED HEALTH CARE EDUCATION/TRAINING PROGRAM

## 2023-10-18 PROCEDURE — 250N000013 HC RX MED GY IP 250 OP 250 PS 637: Performed by: HOSPITALIST

## 2023-10-18 PROCEDURE — 250N000011 HC RX IP 250 OP 636: Mod: JZ | Performed by: STUDENT IN AN ORGANIZED HEALTH CARE EDUCATION/TRAINING PROGRAM

## 2023-10-18 PROCEDURE — G0463 HOSPITAL OUTPT CLINIC VISIT: HCPCS

## 2023-10-18 PROCEDURE — 99223 1ST HOSP IP/OBS HIGH 75: CPT | Performed by: NURSE PRACTITIONER

## 2023-10-18 PROCEDURE — 85610 PROTHROMBIN TIME: CPT | Performed by: STUDENT IN AN ORGANIZED HEALTH CARE EDUCATION/TRAINING PROGRAM

## 2023-10-18 PROCEDURE — 80048 BASIC METABOLIC PNL TOTAL CA: CPT | Performed by: HOSPITALIST

## 2023-10-18 PROCEDURE — 85520 HEPARIN ASSAY: CPT | Performed by: HOSPITALIST

## 2023-10-18 PROCEDURE — 250N000011 HC RX IP 250 OP 636: Performed by: STUDENT IN AN ORGANIZED HEALTH CARE EDUCATION/TRAINING PROGRAM

## 2023-10-18 RX ORDER — WARFARIN SODIUM 4 MG/1
8 TABLET ORAL
Status: COMPLETED | OUTPATIENT
Start: 2023-10-18 | End: 2023-10-18

## 2023-10-18 RX ADMIN — METHADONE HYDROCHLORIDE 10 MG: 10 TABLET ORAL at 15:15

## 2023-10-18 RX ADMIN — SENNOSIDES AND DOCUSATE SODIUM 2 TABLET: 50; 8.6 TABLET ORAL at 21:53

## 2023-10-18 RX ADMIN — TORSEMIDE 15 MG: 10 TABLET ORAL at 08:51

## 2023-10-18 RX ADMIN — WARFARIN SODIUM 8 MG: 4 TABLET ORAL at 17:53

## 2023-10-18 RX ADMIN — SENNOSIDES AND DOCUSATE SODIUM 2 TABLET: 50; 8.6 TABLET ORAL at 08:51

## 2023-10-18 RX ADMIN — ATORVASTATIN CALCIUM 40 MG: 40 TABLET, FILM COATED ORAL at 21:53

## 2023-10-18 RX ADMIN — METHADONE HYDROCHLORIDE 5 MG: 5 TABLET ORAL at 22:09

## 2023-10-18 RX ADMIN — MICONAZOLE NITRATE: 2 POWDER TOPICAL at 22:10

## 2023-10-18 RX ADMIN — HEPARIN SODIUM 1650 UNITS/HR: 10000 INJECTION, SOLUTION INTRAVENOUS at 21:48

## 2023-10-18 RX ADMIN — MULTIPLE VITAMINS W/ MINERALS TAB 1 TABLET: TAB at 08:51

## 2023-10-18 RX ADMIN — INSULIN ASPART 1 UNITS: 100 INJECTION, SOLUTION INTRAVENOUS; SUBCUTANEOUS at 17:53

## 2023-10-18 RX ADMIN — METHADONE HYDROCHLORIDE 5 MG: 5 TABLET ORAL at 05:38

## 2023-10-18 RX ADMIN — HYDROMORPHONE HYDROCHLORIDE 0.5 MG: 1 INJECTION, SOLUTION INTRAMUSCULAR; INTRAVENOUS; SUBCUTANEOUS at 21:51

## 2023-10-18 RX ADMIN — ASPIRIN 81 MG: 81 TABLET, COATED ORAL at 08:51

## 2023-10-18 RX ADMIN — MICONAZOLE NITRATE: 2 POWDER TOPICAL at 08:51

## 2023-10-18 RX ADMIN — HEPARIN SODIUM 1650 UNITS/HR: 10000 INJECTION, SOLUTION INTRAVENOUS at 05:38

## 2023-10-18 RX ADMIN — Medication 1 PACKET: at 08:51

## 2023-10-18 RX ADMIN — METHADONE HYDROCHLORIDE 5 MG: 5 TABLET ORAL at 10:45

## 2023-10-18 ASSESSMENT — ACTIVITIES OF DAILY LIVING (ADL)
ADLS_ACUITY_SCORE: 35
ADLS_ACUITY_SCORE: 39
ADLS_ACUITY_SCORE: 39
ADLS_ACUITY_SCORE: 35
ADLS_ACUITY_SCORE: 39

## 2023-10-18 NOTE — PLAN OF CARE
Goal Outcome Evaluation:    Orientation: A&Ox4  Activity Level: A2 w/ lift. Refuses repositioning at night.   Behavior & Aggression: Green  Pain Management: Scheduled Methadone.   ABNL VS/O2: VSS on RA.   Tele: NA  ABNL Lab/BG: B  Diet: Consistent carb diet.   Bowel/Bladder: Continent of B&B, patient will call when she wants to urinate. Use purwick then take out. Pt.   Drains/Devices: PICC infusing heparin.   Skin: Dressing on BKA clean, dry, and intact. Redness/blanchable on buttocks.   Anticipated DC Date: TBD  Other Important Info: Heparin @ 1650 units/hr. Hep-AntiXa resulted .42 this morning. Second consecutive result within normal range. Recheck tomorrow AM.

## 2023-10-18 NOTE — PROGRESS NOTES
Vascular Surgery Progress Note    Pat continues to have adequate pain control, no acute issues.     Discussed with daughter and patient yesterday and again this morning about plan of care.     Unfortunately, her left lower extremity and wounds have not progressed or improved. That being said, will be unable to close for the time being and this will be an ongoing months-long process. There are no viable options at this time, however given her increased risk for infection, aggressive wound cares will play a paramount roll. Discussed with care coordination yesterday, but Pat will likely need LTACH going forward.     From a Vascular Surgery standpoint, we will check in monthly to assess wound progress. Will also consult Palliative Care. Appreciate all teams and staff involved in Margaret's care.     Vanita Carter, Vascular Surgery NP    Total time: 1 hour.

## 2023-10-18 NOTE — PROGRESS NOTES
"[facility] Nurse Inpatient Assessment     Consulted for: 9/30 Wound diabetic foot ulcer left foot (now s/p BKA and wound vac placement); 10/2 Wound right buttocks.     Summary:   [1) assessed 10/17= Left heel ulcer: s/p BKA 10/7, vac removed and now doing Vashe dressing changes. No plans for closure for about a month. Deroofed blisters to sreekanth-wound skin. Patient needs to be premedicated so contact RN to plan]  10/18  2) Buttock/Coccyx: masd and friction, changed plan of care orders 10/18  3) Left IT: Unstageable ulcer present on admission, changed plan of care orders 10/18    Patient History (according to provider note(s):      \"78 year old female admitted on 9/29/2023. She presents as a transfer from [facility] for L heel osteomyelitis \"    Assessment:      Areas visualized during today's visit: Perineal area and Sacrum/coccyxleft     Wound location: Coccyx/buttock  Last photo: 10-18-23        Wound due to: Friction and Incontinence Associated Dermatitis (IAD)  Wound history/plan of care: Patient with wounds on admission  Wound base: dermis      Palpation of the wound bed: normal      Drainage: scant      Description of drainage: serosanguinous     Measurements (length x width x depth, in cm): sacrum and coccyx found intact. Right IT area 5cm x 5cmx 0.1cm      Tunneling: N/A     Undermining: N/A  Periwound skin: Intact      Color: pale, pink, and purple      Temperature: normal   Odor: none  Pain: mild and moderate, intermittent and tender  Pain interventions prior to dressing change: slow and gentle cares   Treatment goal: Heal , Drainage control, Decrease moisture, and Protection  STATUS: improving   Supplies ordered: at bedside, supplies stored on unit, and discussed with patient     Wound location: Left IT  Last photo: 10-18-23    Wound due to: Mixed Etiology: moisture, friction, pressure  Wound history/plan of care: found open to air   Wound base: granulation tissue      Palpation " of the wound bed: normal      Drainage: scant     Description of drainage: serosanguinous     Measurements (length x width x depth, in cm): 1  x 1.8  x  0.1 cm      Tunneling: N/A     Undermining: N/A  Periwound skin: Intact      Color: pale, pink, and purple      Temperature: normal   Odor: none  Pain: mild, stinging  Pain interventions prior to dressing change: slow and gentle cares   Treatment goal: Heal , Drainage control, Decrease moisture, and Protection  STATUS: improving  Supplies ordered: at bedside, supplies stored on unit, and discussed with patient       Wound location: Left BKA   Last photo: 10/17/23      Wound due to: Pressure Injury and Diabetic Ulcer, now surgical wound/amp  Wound history/plan of care: s/p saritha CRUZ 10/7  Wound base:  ~pale pink and white non-granular tissue, strings of adherent fibrinous tissue, some adipose tissue, areas of red, bleeding friable tissue at center of wound bed.  ~Left lateral thigh wounds are pink-red moist tissue from deroofed blisters     Palpation of the wound bed: spongy and fleshy     Drainage: moderate     Description of drainage: serous, serosanguinous, yellow, and tan     Measurements (length x width x depth, in cm): 18  x 16  x 2cm to center (stable); left upper lateral thigh 8 x 5 x < 0.1 cm; left lateral thigh 6 x 8 x < 0.1 cm     Tunneling: N/A     Undermining: N/A  Periwound skin: Edematous and Skin stripping,       Color: red/pink        Temperature: normal   Odor: moderate  Pain: severe, Irritable or crying out at intervals, tension to hands, feet and body, facial expression of distress, and during dressing change, unable to describe  Pain interventions prior to dressing change: oral dilaudid, IV dilaudid, slow and gentle cares , and distraction, pt reports this dressing change more tolerable than the vac  Treatment goal: Drainage control, Infection control/prevention, Maintain (prevention of deterioration), Protection, and Prepare wound bed for  flap/graft  STATUS: stable  Supplies ordered: gathered and discussed with patient ,    Wound location: Right posterior lower leg    Last photo: 10/11/23      Wound due to: Venous Ulcer  Wound history/plan of care: unclear  Chronic non healing ulcer, chronic edema with diabetes    Wound base: drying flaking white-yellow-pink tissue and serous scabbing     Palpation of the wound bed: normal      Drainage: scant     Description of drainage: serosanguinous     Measurements (length x width x depth, in cm): 1.5  x 2  x  0.1 cm      Tunneling: N/A     Undermining: N/A  Periwound skin: Intact, Edematous, and Erythema- blanchable      Color: pink      Temperature: normal   Odor: none  Pain: denies , none  Pain interventions prior to dressing change: N/A  Treatment goal: Heal  and Infection control/prevention  STATUS: improving  Supplies ordered: at bedside, supplies stored on unit, and discussed with RN      Wound location: Left lateral LE  Last photo: 10/6/23        Wound due to: Venous Ulcer  Wound history/plan of care: Chronic non healing ulcer, chronic edema with diabetes  Wound base: pink, flaking epidermis     Palpation of the wound bed: normal      Drainage: scant     Description of drainage: serosanguinous     Measurements (length x width x depth, in cm): 2  x 0.8  x  0.1 cm (not measured 10/11)     Tunneling: N/A     Undermining: N/A  Periwound skin: Dry/scaly, Edematous       Color: normal and consistent with surrounding tissue      Temperature: normal   Odor: none  Pain: denies , none  Pain interventions prior to dressing change: N/A  Treatment goal: Heal   STATUS: resurfaced  Supplies ordered: at bedside  Treatment Plan:       Wound care  EVERY SHIFT        Comments: Location: Buttock/ left IT/ perineal  Provided: by primary RN qshift  1. Cleanse with generous amount Marisol perineal lotion and soft dry cloths.  Spray the Marisol directly on the skin.  Ensure cleansing to base of all folds.    2. Apply Adapt Stoma  powder (#[id number]) to all skin folds and wound areas, ok to apply powder directly onto open bleeding areas.  Spread the powder with a dry cloth - this helps get a thin, even layer. Ok to apply as often as preferred by patient, at least qshift  3. Trial using the PureWick only when needs to void.  4. PIP measures.  Bariatric low air loss mattress.             Wound care  DAILY        Comments: Right BKA site: Daily and PRN for saturation due to copious drainage  1. Use Vashe soaked gauze fluffs to cleanse sreekanth-wound skin out to about 4 in. Let dry.  2. Place Vashe soaked gauze fluffs over wound bed for about 2 minutes to cleanse and debride wound bed.  3. Swab sreekanth-wound skin with no-sting barrier and let dry completely.  4. Cover two open areas on lateral thigh with Xeroform gauze. Cut to approximately fit wound bed.  5. Cover stump wound with Adaptic.  6. Use Vashe soaked Kerlix roll to cover wound bed.  7. Use two ABD pads to cover BKA stump. Secure with Medipore tape.  8. Wrap with Kerlix and secure with Medipore tape.          Wound care  EVERY THIRD DAY      Comments: Bilateral lower legs: every 3 days and prn:  1. Cleanse any open areas with wound cleanser.  Cover with Mepilex.  2. Mepilex to right heel for protection prn.  3. Elevate legs; float right heel, cushion left BKA.        Orders: Reviewed and Updated    RECOMMEND PRIMARY TEAM ORDER: None, at this time  Education provided: plan of care, wound progress, Moisture management, Hygiene, and Off-loading pressure  Discussed plan of care with: Patient and Nurse   WOC nurse follow-up plan: weekly   Notify WOC if wound(s) deteriorate.  Nursing to notify the Provider(s) and re-consult the WOC Nurse if new skin concern.    DATA:     Current support surface: Standard  Low air loss (BRISA pump, Isolibrium, Pulsate, skin guard, etc)  Containment of urine/stool: Incontinence Protocol, Incontinent pad in bed, and Purewick external catheter   BMI: Body mass index is 41.99  kg/m .   Active diet order: Orders Placed This Encounter      Consistent Carbohydrate Diet Moderate Consistent Carb (60 g CHO per Meal) Diet     Output: I/O last 3 completed shifts:  In: 250 [P.O.:250]  Out: 1500 [Urine:1500]     Labs:   Recent Labs   Lab 10/18/23  0610 10/17/23  0602   HGB  --  7.7*   INR 1.39* 1.30*   WBC  --  7.3     Pressure injury risk assessment:   Sensory Perception: 3-->slightly limited  Moisture: 4-->rarely moist  Activity: 2-->chairfast  Mobility: 3-->slightly limited  Nutrition: 3-->adequate  Friction and Shear: 2-->potential problem  Suresh Score: 17    Gaby CWOCN   1st choice: Securely message with [employer] ([facility] [employer])   (2nd option: [facility] Office Phone [phone], messages checked periodically Mon-Fri 8a-4p)

## 2023-10-18 NOTE — CONSULTS
"Palliative Care Consultation Note  [facility]      Patient: Linda Loredo  Date of Admission:  2023    Requesting Clinician / Team: Vanita Carter CNP with Vascular Surgery   Reason for consult:  Symptom management  Patient and family support     Recommendations & Counseling     GOALS OF CARE:   Life-prolonging without limits   Margaret was not expecting the recovery from her BKA to be so long and complicated; she had hoped to \"have the surgery and move on.\" She is willing to continue to undergo the burden of a prolonged recovery, it is just taking some time to adjust her expectations and cope with it.    Margaret was living independently, in a senior living apartment prior to admission. She and her daughter, Lu, spoke today about how that probably wasn't safe anymore and she needs to have a safer plan in place when she is done with rehab. They are trying to figure out where best could meet her needs and if they should move her stuff out of her apartment in the meantime. They are leaning toward an assisted living facility and are grateful they can take their time while she is hospitalized to find the right facility.     ADVANCE CARE PLANNING:  No health care directive on file. Per  informed consent policy, next of kin should be involved if patient becomes unable. Next of kin is Margaret's daughter, Lu. Lu is very involved in her mother's care and already supporting her mother in her decisional process.   There is no POLST form on file,  recommend continued medical treatment and FULL CODE   Code status: Full Code    MEDICAL MANAGEMENT:   #Chronic Neuropathy:   Follows with the pain clinic of [facility]. Sees her provider every 3 months for close management of long-term Methadone.   Current Methadone dosin mg PO TID (06, 12, 20) and 10 mg PO every afternoon at 1500   Palliative Care will not take over management of chronic pain. Documenting for future knowledge of plan and to " "note patient's tolerance to opioids.     #Acute surgical pain related to BKA  First dressing change after amputation was \"horrible and traumatic\" 2/2 uncontrolled pain   Current regimen: Hydromorphone 4 mg PO 30 minutes prior to dressing change; 15 minutes later Hydromorphone 0.5 mg IV; then wait 15 minutes before starting wound care.    Currently satisfied with pain control during dressing changes and does not identify any need for any changes to the regimen. However, finds it comforting that Palliative Care could help adjust medication regimen if needed. Margaret remembers vividly how horrible it was to go through a dressing change without adequate pain control, and can identify it has led to some anticipatory anxiety related to wound care. Also notes there will be at least one more surgery in the future (hopefully to close leg wound) and many more dressing changes, so needs could change.     PSYCHOSOCIAL/SPIRITUAL SUPPORT:  Family : daughter Lu and her  are primary support; grandson Ronaldo (20) and granddaughter (22) are important to Margaret's life and struggling some with her being so sick. Grandchildren also recently had a grandfather die.    Friends : Identifies a large supportive group of friends in her senior living apartment building and beyond. Talks a lot about how she helps others (e.g.helping them place online orders, read documents and understand them, runs a bingo group). Margaret is used to being the one to help others, and struggles getting help.   Marina community: Lutheran marina important part of meaning making and coping tools. Meets once a week for communion and to say The Rosary with fellow Catholics in her building, which she identifies as very comforting.   Identifies a recent rehab stay that preceded a hospitalization for sepsis, as very scary and would like help working through some of the medical trauma associated, as she knows she has a prolonged medical course ahead and does not want these " "fears to keep her from making progress. Will involve our Palliative LICSW, Bettye, in Margaret's care.     Palliative Care will continue to follow.     Thank you for the opportunity to be involved in the care of this patient. Please reach out with questions or concerns.     MOE Chapman CNP  Palliative Care Consult Team  Securely message with the [employer] Web Console (learn more here) or  Text page via [employer] Paging/Directory     130 minutes spent chart reviewing, coordinating care with medical team, discussing complex illness course related to recent BKA, coping, and goals of care with patient and her daughter, and documenting. Face-to-face time: [id number]    Assessment      Linda \"Pat\" Awa Loredo is a 78 year old female with a past medical history of DM type II, CKD stage III, obesity, BERLIN and chronic venous stasis. She underwent a left foot decubitus ulcer debridement by podiatry on 9/26/23 at [facility] for left heel osteomyelitis. She was then transferred to [facility] on 9/29 for ongoing care, and underwent a left BKA on 10/7 and further debridement on 10/14. Post op has been complicated by infection, inability to close surgical wound, and intense wound cares causing significant discomfort.     Today, the patient was seen for goals of care and coping related to:  Chronic venous statis   Left BKA 2/2 heel ulcer and Osteomyelitis  DM type II  Multiple slow healing wounds   Deconditioning related to complex illness     Palliative Care Summary:   I met with Margaret and her daughter, Lu, today to introduce the Palliative Care team and services we provide. We discussed our team's role with symptom management, assistance navigating chronic illness and goals for treatment, counseling, psychosocial and spiritual support.    Margaret understands Palliative Care will not take over management of her chronic pain, but is interested in having someone available to address painful dressing changes, if needs " arise in the future. She is also interested in working through some medical trauma and adjustment to illness concerns with one of our LICSWs.     Prognosis, Goals, & Planning:    Functional Status just prior to this current hospitalization:  ECOG2 (Ambulatory and capable of all selfcare but unable to carry out any work activities; may need help with IADLs up and about > 50% of waking hours)  Notes multiple falls recently that have resulted in her being stuck on the ground for 1-2 days. Family was calling and she didn't tell them what was going on due to fear of being made to leave her apartment and move somewhere with more help. (Now open to this change)    Patient's decision making preferences: independently        Patient has decision-making capacity today for complex decisions: Intact       Coping, Meaning, & Spirituality:   Mood, coping, and/or meaning in the context of serious illness were addressed today: Yes   identified multiple times throughout visit how hard it is to accept she will be in the hospital for multiple more months, and that she is no longer able to live independently.  Is very integrated in her community at her apartment building and this will definitely be a loss when she moves.   Synagogue tamiko is important. Finds comfort in praying the rosary and taking communion.     Medications:  I have reviewed this patient's medication profile and medications from this hospitalization.     Notable medications:  Methadone 5 mg TID (06, 12, 20)  Methadone 10 mg additional dose at 1500 daily    Senna-docusate 2 tabs BID  Tylenol 650 mg q4h prn (0)  Hydromorphone 4 mg PO q4h prn (x1)  Hydromorphone 0.3-0.5 mg IV q2h prn (x2 - 1mg)   Zofran prn (0)  Miralax prn (0)  Senna-docusate prn (0)    ROS:  Comprehensive ROS is reviewed and is negative except as here & per HPI:     Physical Exam   Vital Signs with Ranges  Temp:  [97.9  F (36.6  C)-98.3  F (36.8  C)] 98.2  F (36.8  C)  Pulse:  [73-92] 92  Resp:  [16-18]  18  BP: ()/(52-62) 112/62  SpO2:  [91 %-97 %] 97 %  260 lbs 2.28 oz    PHYSICAL EXAM:  Constitutional: Pleasant female, seen lying in hospital bed. Ill appearing, but in NAD.  ENT: Normocephalic. Atraumatic. No scleral icterus. MMM.   CV: Warm and well perfused. 2+ generalized edema.  Pulm: Non-labored breathing, on room air. Speaking in complete sentences.    Musculoskeletal: left BKA. Generalized weakness but able to move all extremities.   Skin: No jaundice. BKA dressing intact, with moderate serous drainage noted.   Neuro: A/O x 4. Face symmetrical. PERRL. EOMI.   Psych: Speech clear. Affect appropriate to situation. Memory and insight intact.      Data reviewed:  CMP  Recent Labs   Lab 10/18/23  0826 10/18/23  0610 10/17/23  2133 10/17/23  1702 10/17/23  1241 10/17/23  0602 10/16/23  1110 10/16/23  0642 10/15/23  0833 10/15/23  0519   NA  --  140  --   --   --  139  --  139  --  140   POTASSIUM  --  3.7  --   --   --  4.1  --  4.3  --  4.2   CHLORIDE  --  102  --   --   --  102  --  103  --  104   CO2  --  29  --   --   --  30*  --  27  --  29   ANIONGAP  --  9  --   --   --  7  --  9  --  7   * 139* 136* 141*   < > 156*   < > 153*   < > 172*   BUN  --  19.7  --   --   --  18.9  --  18.6  --  19.0   CR  --  1.03*  --   --   --  1.05*  --  0.98*  --  1.02*  1.02*   GFRESTIMATED  --  55*  --   --   --  54*  --  59*  --  56*  56*   ZAKIA  --  10.1  --   --   --  9.9  --  9.8  --  9.8   MAG  --   --   --   --   --  1.7  --   --   --   --    PHOS  --   --   --   --   --  2.6  --   --   --   --     < > = values in this interval not displayed.     CBC  Recent Labs   Lab 10/17/23  0602 10/16/23  0642 10/15/23  0519 10/14/23  0614 10/13/23  0545 10/12/23  0813   WBC 7.3 8.5 7.6  --   --  6.8   RBC 2.52* 2.71* 2.73*  --   --  3.11*   HGB 7.7* 8.1* 8.2* 8.0*   < > 9.3*   HCT 24.7* 26.6* 26.8*  --   --  29.6*   MCV 98 98 98  --   --  95   MCH 30.6 29.9 30.0  --   --  29.9   MCHC 31.2* 30.5* 30.6*  --   --   31.4*   RDW 14.3 14.5 14.6  --   --  14.6    179 189  --   --  190    < > = values in this interval not displayed.     INR  Recent Labs   Lab 10/18/23  0610 10/17/23  0602 10/16/23  0810 10/15/23  0519   INR 1.39* 1.30* 1.25* 1.22*

## 2023-10-18 NOTE — PROGRESS NOTES
Date & Time: 0700-1930  Surgery/POD#: Left BKA  Behavior & Aggression: Green  Fall Risk: Yes  Orientation:x4  ABNL VS/O2:On room air  ABNL Labs: See chart  Pain Management:Methadone   Bowel/Bladder: Continent  Drains: Triple Lumen PICC  Diet:Mod carb  Activity Level: Ax2 w/ lift  Tests/Procedures: NA  Anticipated  DC Date: Pending  Significant Information: Heparin gtt therapeutic this shift, recheck tmrw am. Awaiting placement

## 2023-10-18 NOTE — PROGRESS NOTES
Care Management Follow Up    Length of Stay (days): 19    Expected Discharge Date: 10/20/2023     Concerns to be Addressed: adjustment to diagnosis/illness, coping/stress, discharge planning     Patient plan of care discussed at interdisciplinary rounds: Yes    Anticipated Discharge Disposition: LTACH      Anticipated Discharge Services:    Anticipated Discharge DME:      Patient/family educated on Medicare website which has current facility and service quality ratings:    Education Provided on the Discharge Plan: Yes  Patient/Family in Agreement with the Plan: yes    Referrals Placed by CM/SW:    Private pay costs discussed: Not applicable    Additional Information:  Writer and care coordinator met with patient at bedside to discuss discharge planning to LTACH. Patient understands and is in agreement with referral. Patient would need transportation set up. Patient knows there are two LTACH in the [facility], prefers referral to [facility]    LIZY Sol

## 2023-10-19 ENCOUNTER — APPOINTMENT (OUTPATIENT)
Dept: PHYSICAL THERAPY | Facility: CLINIC | Age: 78
DRG: 617 | End: 2023-10-19
Attending: HOSPITALIST
Payer: COMMERCIAL

## 2023-10-19 LAB
ANION GAP SERPL CALCULATED.3IONS-SCNC: 9 MMOL/L (ref 7–15)
BUN SERPL-MCNC: 18.9 MG/DL (ref 8–23)
CALCIUM SERPL-MCNC: 9.9 MG/DL (ref 8.8–10.2)
CHLORIDE SERPL-SCNC: 102 MMOL/L (ref 98–107)
CREAT SERPL-MCNC: 0.98 MG/DL (ref 0.51–0.95)
DEPRECATED HCO3 PLAS-SCNC: 29 MMOL/L (ref 22–29)
EGFRCR SERPLBLD CKD-EPI 2021: 59 ML/MIN/1.73M2
GLUCOSE BLDC GLUCOMTR-MCNC: 114 MG/DL (ref 70–99)
GLUCOSE BLDC GLUCOMTR-MCNC: 125 MG/DL (ref 70–99)
GLUCOSE BLDC GLUCOMTR-MCNC: 147 MG/DL (ref 70–99)
GLUCOSE BLDC GLUCOMTR-MCNC: 157 MG/DL (ref 70–99)
GLUCOSE BLDC GLUCOMTR-MCNC: 164 MG/DL (ref 70–99)
GLUCOSE SERPL-MCNC: 136 MG/DL (ref 70–99)
INR PPP: 3.05 (ref 0.85–1.15)
INR PPP: 3.62 (ref 0.85–1.15)
POTASSIUM SERPL-SCNC: 3.8 MMOL/L (ref 3.4–5.3)
SODIUM SERPL-SCNC: 140 MMOL/L (ref 135–145)
UFH PPP CHRO-ACNC: 0.4 IU/ML
UFH PPP CHRO-ACNC: >1.1 IU/ML

## 2023-10-19 PROCEDURE — 250N000013 HC RX MED GY IP 250 OP 250 PS 637: Performed by: STUDENT IN AN ORGANIZED HEALTH CARE EDUCATION/TRAINING PROGRAM

## 2023-10-19 PROCEDURE — 250N000013 HC RX MED GY IP 250 OP 250 PS 637: Performed by: HOSPITALIST

## 2023-10-19 PROCEDURE — 120N000001 HC R&B MED SURG/OB

## 2023-10-19 PROCEDURE — 85520 HEPARIN ASSAY: CPT | Performed by: HOSPITALIST

## 2023-10-19 PROCEDURE — 250N000011 HC RX IP 250 OP 636: Mod: JZ | Performed by: STUDENT IN AN ORGANIZED HEALTH CARE EDUCATION/TRAINING PROGRAM

## 2023-10-19 PROCEDURE — 80048 BASIC METABOLIC PNL TOTAL CA: CPT | Performed by: HOSPITALIST

## 2023-10-19 PROCEDURE — 36415 COLL VENOUS BLD VENIPUNCTURE: CPT | Performed by: HOSPITALIST

## 2023-10-19 PROCEDURE — 99233 SBSQ HOSP IP/OBS HIGH 50: CPT | Performed by: HOSPITALIST

## 2023-10-19 PROCEDURE — 97530 THERAPEUTIC ACTIVITIES: CPT | Mod: GP

## 2023-10-19 PROCEDURE — 85610 PROTHROMBIN TIME: CPT | Performed by: HOSPITALIST

## 2023-10-19 PROCEDURE — 250N000011 HC RX IP 250 OP 636: Performed by: HOSPITALIST

## 2023-10-19 RX ADMIN — MULTIPLE VITAMINS W/ MINERALS TAB 1 TABLET: TAB at 08:33

## 2023-10-19 RX ADMIN — SENNOSIDES AND DOCUSATE SODIUM 2 TABLET: 50; 8.6 TABLET ORAL at 08:34

## 2023-10-19 RX ADMIN — METHADONE HYDROCHLORIDE 5 MG: 5 TABLET ORAL at 21:33

## 2023-10-19 RX ADMIN — ASPIRIN 81 MG: 81 TABLET, COATED ORAL at 08:33

## 2023-10-19 RX ADMIN — HYDROMORPHONE HYDROCHLORIDE 4 MG: 2 TABLET ORAL at 16:05

## 2023-10-19 RX ADMIN — METHADONE HYDROCHLORIDE 5 MG: 5 TABLET ORAL at 05:59

## 2023-10-19 RX ADMIN — SENNOSIDES AND DOCUSATE SODIUM 1 TABLET: 50; 8.6 TABLET ORAL at 21:42

## 2023-10-19 RX ADMIN — METHADONE HYDROCHLORIDE 5 MG: 5 TABLET ORAL at 10:12

## 2023-10-19 RX ADMIN — METHADONE HYDROCHLORIDE 10 MG: 10 TABLET ORAL at 14:02

## 2023-10-19 RX ADMIN — ATORVASTATIN CALCIUM 40 MG: 40 TABLET, FILM COATED ORAL at 21:34

## 2023-10-19 RX ADMIN — Medication 1 PACKET: at 08:33

## 2023-10-19 RX ADMIN — HEPARIN SODIUM 1650 UNITS/HR: 10000 INJECTION, SOLUTION INTRAVENOUS at 13:40

## 2023-10-19 RX ADMIN — TORSEMIDE 15 MG: 10 TABLET ORAL at 08:34

## 2023-10-19 RX ADMIN — HYDROMORPHONE HYDROCHLORIDE 0.5 MG: 1 INJECTION, SOLUTION INTRAMUSCULAR; INTRAVENOUS; SUBCUTANEOUS at 17:04

## 2023-10-19 ASSESSMENT — ACTIVITIES OF DAILY LIVING (ADL)
ADLS_ACUITY_SCORE: 35

## 2023-10-19 NOTE — PROGRESS NOTES
Care Management Follow Up    Length of Stay (days): 20    Expected Discharge Date: 10/20/2023     Concerns to be Addressed: adjustment to diagnosis/illness, coping/stress, discharge planning     Patient plan of care discussed at interdisciplinary rounds: Yes    Anticipated Discharge Disposition: LTACH     Anticipated Discharge Services:    Anticipated Discharge DME:      Patient/family educated on Medicare website which has current facility and service quality ratings:    Education Provided on the Discharge Plan: Yes  Patient/Family in Agreement with the Plan: yes    Referrals Placed by CM/SW:    Private pay costs discussed: Not applicable    Additional Information:  Writer called [facility] admissions to get in touch with LTACH admissions. Spoke wit Terrie Faye regarding patients wound care and after care plan. Terrie is checking patients insurance and anticipates that patient can admit early next week.   YUDY following for discharge planning.     LIZY Sol

## 2023-10-19 NOTE — PROGRESS NOTES
[facility]    Medicine Progress Note - Hospitalist Service    Date of Admission:  9/29/2023    Assessment & Plan   Linda Loredo is a 78 year old female admitted on 9/29/2023. She was transferred from [facility] for L heel osteomyelitis.  She underwent BKA on 10/7/23 and further debridement on 10/14/23.    L foot decubitus ulcer status debridement by podiatry on 9/26 with multiple organisms, L heel osteomyelitis/cellulitis  Chronic venous stasis  MRSA positive hx  S/P BKA on 10/7/23  Acute blood loss anemia  Brought in 9/23 after welfare check by police found to have L leg swelling and erythema.  Hx venous stasis ulcers. Presents with L heel decubitus ulcer with osteo. Wound with multiple organisms incl pseudomonas, morganella, e faecalis, bordetella, proteus status underwent debridement 9/26 by podiatry.   Blood cultures done on 9/25/2023 have been negative.  ALMA w/ poor flow, podiatry at outside hospital requested transfer where there are vascular services.  Patient is currently on vancomycin and meropenem as per prior recommendations and status post PICC line placement on 9/27 and infectious disease, podiatry and vascular surgery team assisting.  After considering options she decided to proceed with amputation.   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 Does well with warning prior to planned dressing changes, premedication with oral dilaudid ~30min prior, then IV dilaudid 15 min prior (ensure that all of these meds are not being given at same time as methadone)  --  outpatient follow up with vascular, first appt 11/13, then 12/11/23  --  Monitor hgb, transfuse if <7  -- 10/17 resumed low dose torsemide 15mg once daily (instead of BID) with stable  renal function  --  planning LTACH on discharge, likely bed available week of oct 23     Acute on chronic pain syndrome:   -- Continue methadone 10 mg daily midday and 5 mg TID  -- IV and PO dilaudid prn for breakthrough pain  -- Patient was also evaluated by Psych, no concern for depression, they encouraged patient to continue to use her coping skills to adjust after this surgery   -- palliative care appreciated     Urine culture positive for E faecalis, Staph aureus and E. Coli  -- UA done at outside hospital on 9/23 showed above, she completed >10 day course of antibiotics with vancomycin, ceftriaxone, meropenem on 10/8     DM II:  [PTA degludec 35 units at HS, metformin 2000 mg daily, ozempic]  A1c 7.0 9/23/23. Has been on insulin pump in the past but this was changed to tresiba 8/2023.  -- hold metformin, Ozempic   -- medium resistance SSI  -- Tresiba 12 units qhs     Atrial fibrillation  HTN  HLD:  [PTA- warfarin, simvastatin 20 mg daily, lisinopril 5 mg daily]  -- Continue with statin  -- Continue heparin gtt, resumed warfarin on 10/16  - INR jumped quickly on 10/19--ensure INR at goal on 10/20 then can stop heparin gtt at that time     CKD III:  --Baseline creatinine 1-1.2.   --Stable, monitor occasionally     Failure to thrive:  As above, called by police for welfare check. ? Safety at home. SW had seen at [facility].      Obesity:  -- Complicates care     BERLIN:  -- continue home cpap          Diet: Snacks/Supplements Adult: Glucerna; Between Meals  Consistent Carbohydrate Diet Moderate Consistent Carb (60 g CHO per Meal) Diet  Snacks/Supplements Adult: Gelatein Plus; Between Meals    DVT Prophylaxis: heparin gtt, warfarin  Braga Catheter: Not present  Lines: PRESENT      PICC 10/09/23 Triple Lumen Right Basilic-Site Assessment: WDL      Cardiac Monitoring: None  Code Status: Full Code      Clinically Significant Risk Factors                  # Hypertension: Noted on problem list       # DMII: A1C = 7.0 % (Ref  "range: <5.7 %) within past 6 months   # Severe Obesity: Estimated body mass index is 41.7 kg/m  as calculated from the following:    Height as of this encounter: 1.676 m (5' 6\").    Weight as of this encounter: 117.2 kg (258 lb 6.1 oz).             Disposition Plan      Expected Discharge Date: 10/23/2023    Discharge Delays: Procedure Pending (enter procedure & time in comments)  Destination: inpatient rehabilitation facility  Discharge Comments: amputation closure in a few weeks, waiting for more healing  TCU once pain controlled with only orals            Jennifer Hernandez DO  Hospitalist Service  [facility]  Securely message with [employer] (more info)  Text page via [employer] Paging/Directory   ______________________________________________________________________    Interval History   Patient seen and examined. Issue overnight when patient's left leg dressing needed to be changed, but didn't happen until night-shift. Also issue with plan for pain medication administration at that time--discussed at length with patient, her daughter (via phone) and then discussed separately with charge RN. I added additional recommendations for how to provide pain medications in a safe manner going forward. Otherwise, patient is urinating well. Feels maybe a little less puffy, excited to hear her weight had gone down 2lbs since yesterday since resuming her torsemide.    Physical Exam   Vital Signs: Temp: 97.9  F (36.6  C) Temp src: Oral BP: 109/57 Pulse: 79   Resp: 18 SpO2: 94 % O2 Device: None (Room air)    Weight: 258 lbs 6.07 oz    Constitutional: Awake, alert, cooperative, no acute distress  Respiratory: Clear to auscultation bilaterally, no crackles or wheezing  Cardiovascular: Regular rate and rhythm, normal S1 and S2, and no murmur noted  GI: Normal bowel sounds, soft, non-distended, non-tender  Skin/Integumen: No rashes, no cyanosis, no edema  Other:  Left lower extremity amputation, serous drainage " noted    Medical Decision Making       50 MINUTES SPENT BY ME on the date of service doing chart review, history, exam, documentation & further activities per the note.      Data     I have personally reviewed the following data over the past 24 hrs:    N/A  \   N/A   / N/A     140 102 18.9 /  147 (H)   3.8 29 0.98 (H) \     INR:  3.05 (H) PTT:  N/A   D-dimer:  N/A Fibrinogen:  N/A       Imaging results reviewed over the past 24 hrs:   No results found for this or any previous visit (from the past 24 hour(s)).

## 2023-10-19 NOTE — PROGRESS NOTES
CLINICAL NUTRITION SERVICES - REASSESSMENT NOTE    Recommendations Ordered by Registered Dietitian (RD):   Discontinued Dave  Ordered Gelatein+ BID  Continue Room Service w/ assist   Malnutrition:   % Weight Loss:  Weight loss does not meet criteria for malnutrition  % Intake:  Decreased intake does not meet criteria for malnutrition   Subcutaneous Fat Loss:  None observed  Muscle Loss:  None observed  Fluid Retention:  trace     Malnutrition Diagnosis: Patient does not meet two of the established criteria necessary for diagnosing malnutrition     EVALUATION OF PROGRESS TOWARD GOALS   Diet:  Moderate Consistent Carb  Supplements: Glucerna x1, Dave BID    Intake/Tolerance:    - % intakes per flowsheets  - Ordering TID meals  - Spoke with patient at bedside. She said she does not like the Dave. She has been consuming ~1 per day still but would prefer not to have it. Writer recommended switching to Expedite but patient said she has had that before and doesn't like it either. Writer offered Gelatein+ to help with protein needs and patient willing to try. Per patient, she has liked the quesadilla and tacos. She has tried other items on the menu but always seems to come back to those two items. She said she has been drinking 1 Glucerna per day as well. Writer encouraged adequate intakes and consumption of supplements.    ASSESSED NUTRITION NEEDS:  Dosing Weight: 70.5 kg (adjusted, based on 115.1 kg-- 10/10)  Estimated Energy Needs: 8994-9358 kcal (25-30 Kcal/Kg)  Justification: wound healing w/ BMI in mind  Estimated Protein Needs:  grams protein (1.2-1.5 g pro/Kg)  Justification: wound healing  Estimated Fluid Needs: 1 mL/kcal  Justification: maintenance     NEW FINDINGS:   Procedure 10/14: underwent debridement of left BKA, unable to close BKA at this time     Previous Goals:   Pt to consume >75% of 3 meals/day w/ protein at each + supplements    Evaluation: Met    Previous Nutrition Diagnosis:    Predicted inadequate nutrient intake related to increased needs 2/2 wound healing, potential for menu fatigue 2/2 LOS    Evaluation: No change, updated below    CURRENT NUTRITION DIAGNOSIS  Increased nutrient needs (protein) related to wound healing as evidenced by at least 85 g/day of protein, need for ONS to help with wound healing    INTERVENTIONS  Recommendations / Nutrition Prescription  Discontinued Dave  Ordered Gelatein+ BID  Continue Room Service w/ assist    Implementation  Medical Food Supplement - modified    Goals  Patient to consume >75% of TID meals + 2 supplements per day    MONITORING AND EVALUATION:  Progress towards goals will be monitored and evaluated per protocol and Practice Guidelines    Patrizia Blanc RD, LD  Clinical Dietitian - [facility]

## 2023-10-19 NOTE — PLAN OF CARE
Pt is AOx4, anxious at times, CMS baseline, pain is managed with methadone and dilaudid. Pt was upset about pain meds regimen, communicated with Charge RN and pharmacy, pharmacy stated ok to give methadone and dilaudid together, however dilaudid might have diminishing effect; refuses repositioning at times, edu provided, heparin running, assist x2 with lift, pt requested purewick when voiding. Dressing change and wound care completed per POC on L BKA. PICC flushed, however unable to get enough blood for lab draws, morning labs switched to lab collect, lab is aware, ADLs encouraged. Awaiting therapeutic INR level and placement.

## 2023-10-20 ENCOUNTER — APPOINTMENT (OUTPATIENT)
Dept: PHYSICAL THERAPY | Facility: CLINIC | Age: 78
DRG: 617 | End: 2023-10-20
Attending: HOSPITALIST
Payer: COMMERCIAL

## 2023-10-20 LAB
ANION GAP SERPL CALCULATED.3IONS-SCNC: 9 MMOL/L (ref 7–15)
BUN SERPL-MCNC: 19 MG/DL (ref 8–23)
CALCIUM SERPL-MCNC: 9.9 MG/DL (ref 8.8–10.2)
CHLORIDE SERPL-SCNC: 101 MMOL/L (ref 98–107)
CREAT SERPL-MCNC: 1.04 MG/DL (ref 0.51–0.95)
DEPRECATED HCO3 PLAS-SCNC: 31 MMOL/L (ref 22–29)
EGFRCR SERPLBLD CKD-EPI 2021: 55 ML/MIN/1.73M2
GLUCOSE BLDC GLUCOMTR-MCNC: 115 MG/DL (ref 70–99)
GLUCOSE BLDC GLUCOMTR-MCNC: 136 MG/DL (ref 70–99)
GLUCOSE BLDC GLUCOMTR-MCNC: 158 MG/DL (ref 70–99)
GLUCOSE BLDC GLUCOMTR-MCNC: 160 MG/DL (ref 70–99)
GLUCOSE BLDC GLUCOMTR-MCNC: 195 MG/DL (ref 70–99)
GLUCOSE SERPL-MCNC: 155 MG/DL (ref 70–99)
INR PPP: 3.53 (ref 0.85–1.15)
POTASSIUM SERPL-SCNC: 3.6 MMOL/L (ref 3.4–5.3)
SODIUM SERPL-SCNC: 141 MMOL/L (ref 135–145)
UFH PPP CHRO-ACNC: 0.39 IU/ML

## 2023-10-20 PROCEDURE — 99232 SBSQ HOSP IP/OBS MODERATE 35: CPT | Performed by: HOSPITALIST

## 2023-10-20 PROCEDURE — 80048 BASIC METABOLIC PNL TOTAL CA: CPT | Performed by: HOSPITALIST

## 2023-10-20 PROCEDURE — 97110 THERAPEUTIC EXERCISES: CPT | Mod: GP | Performed by: PHYSICAL THERAPIST

## 2023-10-20 PROCEDURE — 250N000011 HC RX IP 250 OP 636: Mod: JZ | Performed by: STUDENT IN AN ORGANIZED HEALTH CARE EDUCATION/TRAINING PROGRAM

## 2023-10-20 PROCEDURE — 250N000013 HC RX MED GY IP 250 OP 250 PS 637: Performed by: STUDENT IN AN ORGANIZED HEALTH CARE EDUCATION/TRAINING PROGRAM

## 2023-10-20 PROCEDURE — 85610 PROTHROMBIN TIME: CPT | Performed by: STUDENT IN AN ORGANIZED HEALTH CARE EDUCATION/TRAINING PROGRAM

## 2023-10-20 PROCEDURE — 85520 HEPARIN ASSAY: CPT | Performed by: HOSPITALIST

## 2023-10-20 PROCEDURE — 97530 THERAPEUTIC ACTIVITIES: CPT | Mod: GP | Performed by: PHYSICAL THERAPIST

## 2023-10-20 PROCEDURE — 250N000011 HC RX IP 250 OP 636: Performed by: HOSPITALIST

## 2023-10-20 PROCEDURE — 120N000001 HC R&B MED SURG/OB

## 2023-10-20 PROCEDURE — 250N000013 HC RX MED GY IP 250 OP 250 PS 637: Performed by: HOSPITALIST

## 2023-10-20 RX ORDER — TORSEMIDE 5 MG/1
15 TABLET ORAL DAILY
DISCHARGE
Start: 2023-10-20 | End: 2023-11-17

## 2023-10-20 RX ORDER — POTASSIUM CHLORIDE 1500 MG/1
20 TABLET, EXTENDED RELEASE ORAL ONCE
Status: COMPLETED | OUTPATIENT
Start: 2023-10-20 | End: 2023-10-20

## 2023-10-20 RX ORDER — ATORVASTATIN CALCIUM 40 MG/1
40 TABLET, FILM COATED ORAL EVERY EVENING
DISCHARGE
Start: 2023-10-20

## 2023-10-20 RX ORDER — ASPIRIN 81 MG/1
81 TABLET ORAL DAILY
DISCHARGE
Start: 2023-10-21 | End: 2024-07-08

## 2023-10-20 RX ORDER — HYDROMORPHONE HYDROCHLORIDE 4 MG/1
4 TABLET ORAL EVERY 4 HOURS PRN
Status: ON HOLD | DISCHARGE
Start: 2023-10-20 | End: 2023-11-15

## 2023-10-20 RX ORDER — AMOXICILLIN 250 MG
2 CAPSULE ORAL 2 TIMES DAILY
Status: ON HOLD | DISCHARGE
Start: 2023-10-20 | End: 2023-11-15

## 2023-10-20 RX ORDER — MULTIPLE VITAMINS W/ MINERALS TAB 9MG-400MCG
1 TAB ORAL DAILY
DISCHARGE
Start: 2023-10-21 | End: 2024-06-06

## 2023-10-20 RX ADMIN — TORSEMIDE 15 MG: 10 TABLET ORAL at 09:37

## 2023-10-20 RX ADMIN — HEPARIN SODIUM 1650 UNITS/HR: 10000 INJECTION, SOLUTION INTRAVENOUS at 05:33

## 2023-10-20 RX ADMIN — METHADONE HYDROCHLORIDE 5 MG: 5 TABLET ORAL at 05:45

## 2023-10-20 RX ADMIN — HYDROMORPHONE HYDROCHLORIDE 4 MG: 2 TABLET ORAL at 13:27

## 2023-10-20 RX ADMIN — HYDROMORPHONE HYDROCHLORIDE 0.5 MG: 1 INJECTION, SOLUTION INTRAMUSCULAR; INTRAVENOUS; SUBCUTANEOUS at 14:15

## 2023-10-20 RX ADMIN — METHADONE HYDROCHLORIDE 5 MG: 5 TABLET ORAL at 21:29

## 2023-10-20 RX ADMIN — POTASSIUM CHLORIDE 20 MEQ: 1500 TABLET, EXTENDED RELEASE ORAL at 13:27

## 2023-10-20 RX ADMIN — ASPIRIN 81 MG: 81 TABLET, COATED ORAL at 09:38

## 2023-10-20 RX ADMIN — METHADONE HYDROCHLORIDE 5 MG: 5 TABLET ORAL at 11:44

## 2023-10-20 RX ADMIN — SENNOSIDES AND DOCUSATE SODIUM 1 TABLET: 50; 8.6 TABLET ORAL at 21:29

## 2023-10-20 RX ADMIN — ATORVASTATIN CALCIUM 40 MG: 40 TABLET, FILM COATED ORAL at 21:29

## 2023-10-20 RX ADMIN — SENNOSIDES AND DOCUSATE SODIUM 2 TABLET: 50; 8.6 TABLET ORAL at 09:37

## 2023-10-20 RX ADMIN — METHADONE HYDROCHLORIDE 10 MG: 10 TABLET ORAL at 16:16

## 2023-10-20 RX ADMIN — MULTIPLE VITAMINS W/ MINERALS TAB 1 TABLET: TAB at 09:37

## 2023-10-20 ASSESSMENT — ACTIVITIES OF DAILY LIVING (ADL)
ADLS_ACUITY_SCORE: 35

## 2023-10-20 NOTE — PLAN OF CARE
Goal Outcome Evaluation:    Patient is A&O X4, VSS on RA. Left BKA  tan drainage, patient declined dressing change. Prefers it done during the day.  C/o moderate pain managed with schedule Methadone, declines other pain medications offered. Right upper arm PICC infusing heparin at 1650 units/hr, recheck due at 0600 AM. Wound on Coccyx, left thigh, right heel and right calf, Mepilex in place. Abdominal and pelvic redness, powder applied. Tolerating moderate carb diet. Purewick use only when patient call to urinate. Assist of 2, patient refusing repositioning, she says she can self shift weight. Continue with plan of care.

## 2023-10-20 NOTE — PROGRESS NOTES
"Palliative Care Initial Social Work Note  Location: [facility]    Patient Info:  Linda Loredo is a 78 year old female with PMH of DM type II, CKD stage III, obesity, BERLIN and chronic venous stasis.  Underwent a BKA on 10/7.  Post op has been complicated by infection, inability to close surgical wound, and difficulty with wound cares due to discomfort.     Brief summary of visit: Visited with Margaret this afternoon.  Facilitated processing around her experience with her medical care as she makes sense of where she is now, how she feels she arrived to this place, and what she hopes for moving forward.  At one point she shared \"It's mainly a matter of 'one day at a time, 'ask the questions and dig for the answers, and then hope you made the best decision'\".  She feels hopeful about going to [place] which will hopefully take place next week.  Finds comfort in the care she has received and the support she feels from her medical team.  Also has strong support from her daughter.       Date of Admission: 9/29/2023    Reason for consult: Patient and family support    Sources of information: Patient    Recommendations & Plan:  Will follow up next week if discharge to LTACH does not go as planned.        Symptoms & Concerns Addressed Today:  Physical spent time processing her medical journey and how it has felt to navigate her medical care.     Strengths Identified:    Open to visit, able to name needs, hopes and worries / concerns.  Has good family support.     Relationships & Support:  Aspects of relationships and support assessed today:  Identified family members: daughter  Professional supports:  Family coping: no concerns  Bereavement Risk concerns: no concerns    Coping, Mental Health & Adjustment to Illness:   Adjustment to Illness/Hospitalization    Goals, Decision Making & Advance Care Planning:   Prognosis, Goals, and/or Advance Care Planning were assessed today: No  If yes, brief summary of discussion: "   Preferred language: englishe  Patient's decision making preferences: independently  I have concerns about the patient/family's health literacy today: No  Patient has a completed Health Care Directive: Yes, and on file.  Code status per chart review: Full Code      Clinical Social Work Interventions:   Assessment of palliative specific issues    Introduction of Palliative clinical social work interventions   Adjustment to illness counseling  Facilitation of processing of thoughts/feelings      INNA Tao, [facility]   Palliative Care Clinical   Ph: [phone]

## 2023-10-20 NOTE — PLAN OF CARE
Goal Outcome Evaluation:      Diagnosis:L BKA  POD#:13  Mental Status: A & O X 4.  Activity/dangle: In Bed but up with ceiling lift  Diet:Mod Carb diet  Pain:Methadone  Braga/Voiding: pure wick Intermittently  Tele/Restraints/Iso:Contact Iso  02/LDA:# lumen PICC line with one infusing  D/C Date:Pending  Other Info:Wound cares, BG covered per order.

## 2023-10-20 NOTE — PROGRESS NOTES
"SPIRITUAL HEALTH SERVICES - Progress Note    Gen Surg  Saw pt Linda Loredo per patient request for follow up.     Patient/Family Understanding of Illness and Goals of Care - Pat smiled and reported that \"I have made some plans.\" She expects to be discharged, hopefully to [place], early next week while she waits to come back for \"another surgery.\"    Distress and Loss - Pat's main concern today was \"figuring out my apartment\" because \"I won't be able to go back there.\"     Strengths, Coping, and Resources - Margaret knows that her daughter, Lu, will help with her needs as much as she is able to do so. She also believes in the power of prayer.    Meaning, Beliefs, and Spirituality - Pat can name that \"I am not alone\" and says she \"leans on tamiko.\" She requested prayer which I provided.     Plan of Care - [facility] will continue to check in with Pat during her hospitalization.       Loretta Cat  Chaplain Resident    [facility] routine referrals *[phone]    [facility] available 24/7 for emergent requests/referrals, either by paging the on-call  or by entering an ASAP/STAT consult in Epic (this will also page the on-call ).   "

## 2023-10-20 NOTE — PLAN OF CARE
Diagnosis: POD 13 of L BKA from left heel osteomyelitis.   Date & Time: 10/19/2023 3881-1050  Orientation: A&Ox4  Activity Level: A2 w/ lift. Refused repo, able to repo self   Behavior & Aggression: Green  Pain Management: Scheduled Methadone, PRN PO dilaudid & IV dilaudid x1 prior to dressing change    ABNL VS/O2: VSS on RA.   Tele: N/A  ABNL Lab/BG: B, INR 3.05.   Diet: Mod carb diet.   Bowel/Bladder: Continent of B&B, patient will call when she wants to urinate. Use purwick then take out. No BM today.   Drains/Devices: PICC infusing heparin. Redraw therapeutic, scheduled for AM. Villagomez lumen with blood return noted    Skin: Dressing on BKA changed per POC. Redness/blanchable on buttocks, cares provided per POC.   Anticipated DC Date: TBD, pending placement   Other Important Info: Heparin @ 1650 units/hr. Hep-AntiXa resulted .42 this morning. Second consecutive result within normal range. Recheck tomorrow AM.

## 2023-10-20 NOTE — PROGRESS NOTES
[facility]    Medicine Progress Note - Hospitalist Service    Date of Admission:  9/29/2023    Assessment & Plan   Linda Loredo is a 78 year old female admitted on 9/29/2023. She was transferred from [facility] for L heel osteomyelitis.  She underwent BKA on 10/7/23 and further debridement on 10/14/23.    L foot decubitus ulcer status debridement by podiatry on 9/26 with multiple organisms, L heel osteomyelitis/cellulitis  Chronic venous stasis  MRSA positive hx  S/P BKA on 10/7/23  Acute blood loss anemia  Brought in 9/23 after welfare check by police found to have L leg swelling and erythema.  Hx venous stasis ulcers. Presents with L heel decubitus ulcer with osteo. Wound with multiple organisms incl pseudomonas, morganella, e faecalis, bordetella, proteus status underwent debridement 9/26 by podiatry.   Blood cultures done on 9/25/2023 have been negative.  ALMA w/ poor flow, podiatry at outside hospital requested transfer where there are vascular services.  Patient is currently on vancomycin and meropenem as per prior recommendations and status post PICC line placement on 9/27 and infectious disease, podiatry and vascular surgery team assisting.  After considering options she decided to proceed with amputation.   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 Does well with warning prior to planned dressing changes, premedication with oral dilaudid ~30min prior, then IV dilaudid 15 min prior (ensure that all of these meds are not being given at same time as methadone)  --  outpatient follow up with vascular, first appt 11/13, then 12/11/23  --  Monitor hgb, transfuse if <7  -- 10/17 resumed low dose torsemide 15mg once daily (instead of BID) with stable  renal function. Given one time 20meq KCl on 10/20  --  planning LTACH on discharge, likely bed available week of oct 23     Acute on chronic pain syndrome:   -- Continue methadone 10 mg daily midday and 5 mg TID  -- IV and PO dilaudid prn for breakthrough pain  -- Patient was also evaluated by Psych, no concern for depression, they encouraged patient to continue to use her coping skills to adjust after this surgery   -- palliative care appreciated     Urine culture positive for E faecalis, Staph aureus and E. Coli  -- UA done at outside hospital on 9/23 showed above, she completed >10 day course of antibiotics with vancomycin, ceftriaxone, meropenem on 10/8     DM II:  [PTA degludec 35 units at HS, metformin 2000 mg daily, ozempic]  A1c 7.0 9/23/23. Has been on insulin pump in the past but this was changed to tresiba 8/2023.  -- hold metformin, Ozempic   -- medium resistance SSI  -- Tresiba 12 units qhs     Atrial fibrillation  HTN  HLD:  [PTA- warfarin, simvastatin 20 mg daily, lisinopril 5 mg daily]  -- Continue with statin  -- stopped heparin gtt on 10/20 due to INR >goal x2 days  -- continue warfarin per pharmacy (held doses 10/19 and 10/20)     CKD III:  --Baseline creatinine 1-1.2.   --Stable, monitor occasionally     Failure to thrive:  As above, called by police for welfare check. ? Safety at home. SW had seen at [facility].      Obesity:  -- Complicates care     BERLIN:  -- continue home cpap          Diet: Snacks/Supplements Adult: Glucerna; Between Meals  Consistent Carbohydrate Diet Moderate Consistent Carb (60 g CHO per Meal) Diet  Snacks/Supplements Adult: Gelatein Plus; Between Meals    DVT Prophylaxis: heparin gtt, warfarin  Braga Catheter: Not present  Lines: PRESENT      PICC 10/09/23 Triple Lumen Right Basilic-Site Assessment: WDL      Cardiac Monitoring: None  Code Status: Full Code      Clinically Significant Risk Factors                  # Hypertension: Noted on problem list       # DMII: A1C = 7.0 %  "(Ref range: <5.7 %) within past 6 months   # Severe Obesity: Estimated body mass index is 40 kg/m  as calculated from the following:    Height as of this encounter: 1.676 m (5' 6\").    Weight as of this encounter: 112.4 kg (247 lb 12.8 oz).             Disposition Plan      Expected Discharge Date: 10/23/2023    Discharge Delays: Procedure Pending (enter procedure & time in comments)  Destination: inpatient rehabilitation facility  Discharge Comments: amputation closure in a few weeks, waiting for more healing  TCU once pain controlled with only orals            Jennifer Hernandez,   Hospitalist Service  [facility]  Securely message with [email or web address] (more info)  Text page via [employer] Paging/Directory   ______________________________________________________________________    Interval History   Patient seen and examined. INR >goal x2 days, stop heparin. Updated patient and daughter jerrica regarding plan of care. Reviewed weight, looks like weight has come down to 247lbs. She is feeling well otherwise, in good spirits.    Physical Exam   Vital Signs: Temp: 97.5  F (36.4  C) Temp src: Oral BP: (!) 141/115 Pulse: 82   Resp: 18 SpO2: 92 % O2 Device: None (Room air)    Weight: 247 lbs 12.8 oz    Constitutional: Awake, alert, cooperative, no acute distress  Respiratory: Clear to auscultation bilaterally, no crackles or wheezing  Cardiovascular: Regular rate and rhythm, normal S1 and S2, and no murmur noted  GI: Normal bowel sounds, soft, non-distended, non-tender  Skin/Integumen: No rashes, no cyanosis, no edema  Other:  Left lower extremity amputation, serous drainage noted    Medical Decision Making       35 MINUTES SPENT BY ME on the date of service doing chart review, history, exam, documentation & further activities per the note.      Data     I have personally reviewed the following data over the past 24 hrs:    N/A  \   N/A   / N/A     141 101 19.0 /  136 (H)   3.6 31 (H) 1.04 (H) \     INR:  3.53 " (H) PTT:  N/A   D-dimer:  N/A Fibrinogen:  N/A       Imaging results reviewed over the past 24 hrs:   No results found for this or any previous visit (from the past 24 hour(s)).

## 2023-10-20 NOTE — PLAN OF CARE
Date & Time: 10/20/2023 7a-7p  Surgery/POD#: 13 of (L)bka  Behavior & Aggression: green (irritable/labile at times)  Fall Risk: y  Orientation:a/ox4, forgetful (writes everything down)  ABNL VS/O2:vss  ABNL Labs: n/a  Pain Management:methadone (scheduled)  Bowel/Bladder: prurewick prn, lift to commode  Drains: n/a  Diet:consistent cho  Activity Level: 2+lift, repositions self, refuses T/T q2h  Anticipated  DC Date: Mon or Tue (10/23-10/24) to LTACH  Significant Information: VSS, irritable and labile at times. Sched pain meds, dilaudid only given w-dressing change today. Calls for purewick PRN, very irritated labial/perineal area, remove Gently!

## 2023-10-21 LAB
ANION GAP SERPL CALCULATED.3IONS-SCNC: 8 MMOL/L (ref 7–15)
BUN SERPL-MCNC: 20.3 MG/DL (ref 8–23)
CALCIUM SERPL-MCNC: 9.9 MG/DL (ref 8.8–10.2)
CHLORIDE SERPL-SCNC: 102 MMOL/L (ref 98–107)
CREAT SERPL-MCNC: 1.02 MG/DL (ref 0.51–0.95)
DEPRECATED HCO3 PLAS-SCNC: 32 MMOL/L (ref 22–29)
EGFRCR SERPLBLD CKD-EPI 2021: 56 ML/MIN/1.73M2
ERYTHROCYTE [DISTWIDTH] IN BLOOD BY AUTOMATED COUNT: 14.1 % (ref 10–15)
GLUCOSE BLDC GLUCOMTR-MCNC: 138 MG/DL (ref 70–99)
GLUCOSE BLDC GLUCOMTR-MCNC: 155 MG/DL (ref 70–99)
GLUCOSE BLDC GLUCOMTR-MCNC: 165 MG/DL (ref 70–99)
GLUCOSE BLDC GLUCOMTR-MCNC: 173 MG/DL (ref 70–99)
GLUCOSE SERPL-MCNC: 158 MG/DL (ref 70–99)
HCT VFR BLD AUTO: 27.2 % (ref 35–47)
HGB BLD-MCNC: 8.3 G/DL (ref 11.7–15.7)
INR PPP: 2.54 (ref 0.85–1.15)
MAGNESIUM SERPL-MCNC: 1.9 MG/DL (ref 1.7–2.3)
MCH RBC QN AUTO: 29.7 PG (ref 26.5–33)
MCHC RBC AUTO-ENTMCNC: 30.5 G/DL (ref 31.5–36.5)
MCV RBC AUTO: 98 FL (ref 78–100)
PHOSPHATE SERPL-MCNC: 3.2 MG/DL (ref 2.5–4.5)
PLATELET # BLD AUTO: 234 10E3/UL (ref 150–450)
POTASSIUM SERPL-SCNC: 4.2 MMOL/L (ref 3.4–5.3)
RBC # BLD AUTO: 2.79 10E6/UL (ref 3.8–5.2)
SODIUM SERPL-SCNC: 142 MMOL/L (ref 135–145)
WBC # BLD AUTO: 7.3 10E3/UL (ref 4–11)

## 2023-10-21 PROCEDURE — 85027 COMPLETE CBC AUTOMATED: CPT | Performed by: HOSPITALIST

## 2023-10-21 PROCEDURE — 250N000011 HC RX IP 250 OP 636: Performed by: HOSPITALIST

## 2023-10-21 PROCEDURE — 83735 ASSAY OF MAGNESIUM: CPT | Performed by: HOSPITALIST

## 2023-10-21 PROCEDURE — 80048 BASIC METABOLIC PNL TOTAL CA: CPT | Performed by: HOSPITALIST

## 2023-10-21 PROCEDURE — 84100 ASSAY OF PHOSPHORUS: CPT | Performed by: HOSPITALIST

## 2023-10-21 PROCEDURE — 120N000001 HC R&B MED SURG/OB

## 2023-10-21 PROCEDURE — 250N000013 HC RX MED GY IP 250 OP 250 PS 637: Performed by: HOSPITALIST

## 2023-10-21 PROCEDURE — 99232 SBSQ HOSP IP/OBS MODERATE 35: CPT | Performed by: STUDENT IN AN ORGANIZED HEALTH CARE EDUCATION/TRAINING PROGRAM

## 2023-10-21 PROCEDURE — 85610 PROTHROMBIN TIME: CPT | Performed by: STUDENT IN AN ORGANIZED HEALTH CARE EDUCATION/TRAINING PROGRAM

## 2023-10-21 PROCEDURE — 250N000013 HC RX MED GY IP 250 OP 250 PS 637: Performed by: STUDENT IN AN ORGANIZED HEALTH CARE EDUCATION/TRAINING PROGRAM

## 2023-10-21 RX ORDER — WARFARIN SODIUM 2.5 MG/1
2.5 TABLET ORAL
Status: COMPLETED | OUTPATIENT
Start: 2023-10-21 | End: 2023-10-21

## 2023-10-21 RX ADMIN — SENNOSIDES AND DOCUSATE SODIUM 2 TABLET: 50; 8.6 TABLET ORAL at 08:33

## 2023-10-21 RX ADMIN — INSULIN ASPART 1 UNITS: 100 INJECTION, SOLUTION INTRAVENOUS; SUBCUTANEOUS at 17:22

## 2023-10-21 RX ADMIN — SENNOSIDES AND DOCUSATE SODIUM 2 TABLET: 50; 8.6 TABLET ORAL at 20:58

## 2023-10-21 RX ADMIN — TORSEMIDE 15 MG: 10 TABLET ORAL at 08:32

## 2023-10-21 RX ADMIN — ASPIRIN 81 MG: 81 TABLET, COATED ORAL at 08:33

## 2023-10-21 RX ADMIN — METHADONE HYDROCHLORIDE 10 MG: 10 TABLET ORAL at 15:09

## 2023-10-21 RX ADMIN — METHADONE HYDROCHLORIDE 5 MG: 5 TABLET ORAL at 06:32

## 2023-10-21 RX ADMIN — MULTIPLE VITAMINS W/ MINERALS TAB 1 TABLET: TAB at 08:32

## 2023-10-21 RX ADMIN — ATORVASTATIN CALCIUM 40 MG: 40 TABLET, FILM COATED ORAL at 20:58

## 2023-10-21 RX ADMIN — HYDROMORPHONE HYDROCHLORIDE 4 MG: 2 TABLET ORAL at 13:09

## 2023-10-21 RX ADMIN — WARFARIN SODIUM 2.5 MG: 2.5 TABLET ORAL at 17:21

## 2023-10-21 RX ADMIN — HYDROMORPHONE HYDROCHLORIDE 0.5 MG: 1 INJECTION, SOLUTION INTRAMUSCULAR; INTRAVENOUS; SUBCUTANEOUS at 13:45

## 2023-10-21 RX ADMIN — METHADONE HYDROCHLORIDE 5 MG: 5 TABLET ORAL at 20:58

## 2023-10-21 RX ADMIN — METHADONE HYDROCHLORIDE 5 MG: 5 TABLET ORAL at 11:05

## 2023-10-21 ASSESSMENT — ACTIVITIES OF DAILY LIVING (ADL)
ADLS_ACUITY_SCORE: 35
ADLS_ACUITY_SCORE: 35
ADLS_ACUITY_SCORE: 32
ADLS_ACUITY_SCORE: 32
ADLS_ACUITY_SCORE: 31
ADLS_ACUITY_SCORE: 35
ADLS_ACUITY_SCORE: 32
ADLS_ACUITY_SCORE: 35
ADLS_ACUITY_SCORE: 35
ADLS_ACUITY_SCORE: 32
ADLS_ACUITY_SCORE: 32
ADLS_ACUITY_SCORE: 35

## 2023-10-21 NOTE — PROGRESS NOTES
[facility]    Medicine Progress Note - Hospitalist Service    Date of Admission:  9/29/2023    Assessment & Plan   Linda Loredo is a 78 year old female admitted on 9/29/2023. She was transferred from [facility] for L heel osteomyelitis.  She underwent BKA on 10/7/23 and further debridement on 10/14/23.    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  Hx venous stasis ulcers. Presents with L heel decubitus ulcer with osteo. Wound with multiple organisms incl pseudomonas, morganella, e faecalis, bordetella, proteus status underwent debridement 9/26 by podiatry.   Blood cultures done on 9/25/2023 have been negative.  ALMA w/ poor flow, podiatry at outside hospital requested transfer where there are vascular services.  Patient is currently on vancomycin and meropenem as per prior recommendations and status post PICC line placement on 9/27 and infectious disease, podiatry and vascular surgery team assisting.  After considering options she decided to proceed with amputation.   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 Does well with warning prior to planned dressing changes, premedication with oral dilaudid ~30min prior, then IV dilaudid 15 min prior (ensure that all of these meds are not being given at same time as methadone)  --  outpatient follow up with vascular, first appt 11/13, then 12/11/23  --  Monitor hgb, transfuse if <7  --  10/17 resumed low dose torsemide 15mg once daily (instead of BID) with stable renal function. Given one time 20meq KCl on 10/20  --  planning LTACH on discharge, likely bed available week of oct 23  -- vascular surgery feels heeling will be a months long process, currently they are unable to close the stump. They recommend discharge to LTACH for wound cares and they will follow monthly for wound checks.      Acute on chronic pain syndrome:   -- Continue methadone 10 mg daily midday and 5 mg TID  -- IV and PO dilaudid prn for breakthrough pain  -- Patient was also evaluated by Psych, no concern for depression, they encouraged patient to continue to use her coping skills to adjust after this surgery   -- palliative care appreciated     Urine culture positive for E faecalis, Staph aureus and E. Coli  -- UA done at outside hospital on 9/23 showed above, she completed >10 day course of antibiotics with vancomycin, ceftriaxone, meropenem on 10/8     DM II:  [PTA degludec 35 units at HS, metformin 2000 mg daily, ozempic]  A1c 7.0 9/23/23. Has been on insulin pump in the past but this was changed to tresiba 8/2023.  -- hold metformin, Ozempic   -- medium resistance SSI  -- Tresiba 12 units qhs     Atrial fibrillation  HTN  HLD:  [PTA- warfarin, simvastatin 20 mg daily, lisinopril 5 mg daily]  -- Continue with statin  -- stopped heparin gtt on 10/20 due to INR >goal x2 days  -- continue warfarin per pharmacy (held doses 10/19 and 10/20)     CKD III:  --Baseline creatinine 1-1.2.   --Stable, monitor occasionally     Failure to thrive:  As above, called by police for welfare check. ? Safety at home. SW had seen at [facility].      Obesity:  -- Complicates care     BERLIN:  -- continue home cpap          Diet: Snacks/Supplements Adult: Glucerna; Between Meals  Consistent Carbohydrate Diet Moderate Consistent Carb (60 g CHO per Meal) Diet  Snacks/Supplements Adult: Gelatein Plus; Between Meals    DVT Prophylaxis: heparin gtt,  "warfarin  Braga Catheter: Not present  Lines: PRESENT      PICC 10/09/23 Triple Lumen Right Basilic-Site Assessment: WDL      Cardiac Monitoring: None  Code Status: Full Code      Clinically Significant Risk Factors                  # Hypertension: Noted on problem list       # DMII: A1C = 7.0 % (Ref range: <5.7 %) within past 6 months   # Severe Obesity: Estimated body mass index is 40.03 kg/m  as calculated from the following:    Height as of this encounter: 1.676 m (5' 6\").    Weight as of this encounter: 112.5 kg (248 lb).             Disposition Plan     Expected Discharge Date: 10/23/2023    Discharge Delays: Procedure Pending (enter procedure & time in comments)  Destination: inpatient rehabilitation facility  Discharge Comments: amputation closure in a few weeks, waiting for more healing  TCU once pain controlled with only orals            Pierce Aly MD  Hospitalist Service  [facility]  Securely message with [employer] (more info)  Text page via [employer] Paging/Directory   ______________________________________________________________________    Interval History   Patient seen and examined. Discussed with RN, no concerns. Continuing to await ltach bed. Discussed that likely will not happen over the weekend.     Physical Exam   Vital Signs: Temp: 98  F (36.7  C) Temp src: Oral BP: 121/53 Pulse: 74   Resp: 16 SpO2: 91 % O2 Device: None (Room air)    Weight: 248 lbs 0 oz    Constitutional: Awake, alert, cooperative, no acute distress  Respiratory: Clear to auscultation bilaterally, no crackles or wheezing  Cardiovascular: Regular rate and rhythm, normal S1 and S2, and no murmur noted  GI: Normal bowel sounds, soft, non-distended, non-tender  Skin/Integumen: No rashes, no cyanosis, no edema  Other:  Left lower extremity amputation, serous drainage noted    Medical Decision Making       35 MINUTES SPENT BY ME on the date of service doing chart review, history, exam, documentation & " further activities per the note.      Data     I have personally reviewed the following data over the past 24 hrs:    7.3  \   8.3 (L)   / 234     142 102 20.3 /  138 (H)   4.2 32 (H) 1.02 (H) \     INR:  2.54 (H) PTT:  N/A   D-dimer:  N/A Fibrinogen:  N/A       Imaging results reviewed over the past 24 hrs:   No results found for this or any previous visit (from the past 24 hour(s)).

## 2023-10-21 NOTE — PLAN OF CARE
Shift: 2961-1716  Surgery/POD#: POD 7 from an I&D of left LLE. POD 14 from a left BKA.  Behavior & Aggression: Green  Fall Risk: Yes  Orientation: A&O x4  ABNL VS/O2: VSS on RA  ABNL Labs: n/a  Pain Management: Scheduled methadone, PRN dilaudid for dressing changes  Bowel/Bladder: Continent of B&B, requests purewick PRN, bowel sounds normoactive, passing flatus, last BM 10/20  Drains: NA  Lines: PICC SL'd  Skin: LLE daily dressing changed on day shift, buttocks daily wound care, perineum redness  Diet: mod carb  Activity Level: x2 lift, pt ref repositioning, but shifts her weight in bed  Tests/Procedures: NA  Anticipated  DC Date: pending, LTACH week of 10/23  Significant Information: Dressing change done for today

## 2023-10-21 NOTE — PLAN OF CARE
Shift: 7a-3p  Surgery/POD#: POD 7 from an I&D of left LLE. POD 14 from a left BKA.  Behavior & Aggression: Green  Fall Risk: Yes  Orientation: A&O x4  ABNL VS/O2: VSS on RA  ABNL Labs: n/a  Pain Management: scheduled methadone, PRN dilaudid for dressing changes  Bowel/Bladder: continent of B&B, requests purewick PRN, bowel sounds normoactive, passing flatus, last BM 10/20  Drains: NA  Lines: PICC SL'd  Skin: LLE daily dressing change, buttocks daily wound care, perineum redness  Diet: mod carb  Activity Level: x2 lift, pt ref repositioning, but shifts her weight in bed  Tests/Procedures: NA  Anticipated  DC Date: pending, LTACH week of 10/23  Significant Information: Dressing change done for today

## 2023-10-21 NOTE — PLAN OF CARE
Goal Outcome Evaluation:      Plan of Care Reviewed With: patient    Overall Patient Progress: no changeOverall Patient Progress: no change    Shift: 7p - 7a  Surgery/POD#: POD 7 from an I&D of left LLE. POD 14 from a left BKA.  Behavior & Aggression: Green  Fall Risk: Yes  Orientation: A&O x4  ABNL VS/O2: VSS on RA  Tele: NA  ABNL Labs: INR 3.53, recheck daily; glucose 195 & 173 overnight  Pain Management: scheduled methadone, PRN dilaudid for dressing changes  Bowel/Bladder: continent of B&B, requests purewick PRN, bowel sounds normoactive, passing flatus, last BM 10/20  Drains: NA  Lines: PICC SL'd  Skin: LLE daily dressing change, buttocks daily wound care, perineum redness  Diet: mod carb  Activity Level: x2 lift, pt ref repositioning, but shifts her weight in bed  Tests/Procedures: NA  Anticipated  DC Date: pending, LTACH week of 10/23  Significant Information:

## 2023-10-22 LAB
GLUCOSE BLDC GLUCOMTR-MCNC: 122 MG/DL (ref 70–99)
GLUCOSE BLDC GLUCOMTR-MCNC: 129 MG/DL (ref 70–99)
GLUCOSE BLDC GLUCOMTR-MCNC: 143 MG/DL (ref 70–99)
GLUCOSE BLDC GLUCOMTR-MCNC: 168 MG/DL (ref 70–99)
GLUCOSE BLDC GLUCOMTR-MCNC: 175 MG/DL (ref 70–99)
INR PPP: 2.58 (ref 0.85–1.15)

## 2023-10-22 PROCEDURE — 250N000013 HC RX MED GY IP 250 OP 250 PS 637: Performed by: STUDENT IN AN ORGANIZED HEALTH CARE EDUCATION/TRAINING PROGRAM

## 2023-10-22 PROCEDURE — 120N000001 HC R&B MED SURG/OB

## 2023-10-22 PROCEDURE — 250N000011 HC RX IP 250 OP 636: Performed by: HOSPITALIST

## 2023-10-22 PROCEDURE — 99232 SBSQ HOSP IP/OBS MODERATE 35: CPT | Performed by: STUDENT IN AN ORGANIZED HEALTH CARE EDUCATION/TRAINING PROGRAM

## 2023-10-22 PROCEDURE — 250N000013 HC RX MED GY IP 250 OP 250 PS 637: Performed by: HOSPITALIST

## 2023-10-22 PROCEDURE — 85610 PROTHROMBIN TIME: CPT | Performed by: STUDENT IN AN ORGANIZED HEALTH CARE EDUCATION/TRAINING PROGRAM

## 2023-10-22 RX ORDER — WARFARIN SODIUM 2.5 MG/1
2.5 TABLET ORAL
Status: COMPLETED | OUTPATIENT
Start: 2023-10-22 | End: 2023-10-22

## 2023-10-22 RX ADMIN — METHADONE HYDROCHLORIDE 10 MG: 10 TABLET ORAL at 15:42

## 2023-10-22 RX ADMIN — METHADONE HYDROCHLORIDE 5 MG: 5 TABLET ORAL at 19:47

## 2023-10-22 RX ADMIN — METHADONE HYDROCHLORIDE 5 MG: 5 TABLET ORAL at 10:19

## 2023-10-22 RX ADMIN — HYDROMORPHONE HYDROCHLORIDE 0.5 MG: 1 INJECTION, SOLUTION INTRAMUSCULAR; INTRAVENOUS; SUBCUTANEOUS at 13:44

## 2023-10-22 RX ADMIN — ATORVASTATIN CALCIUM 40 MG: 40 TABLET, FILM COATED ORAL at 19:46

## 2023-10-22 RX ADMIN — WARFARIN SODIUM 2.5 MG: 2.5 TABLET ORAL at 19:47

## 2023-10-22 RX ADMIN — ASPIRIN 81 MG: 81 TABLET, COATED ORAL at 10:19

## 2023-10-22 RX ADMIN — SENNOSIDES AND DOCUSATE SODIUM 2 TABLET: 50; 8.6 TABLET ORAL at 10:18

## 2023-10-22 RX ADMIN — TORSEMIDE 15 MG: 10 TABLET ORAL at 10:19

## 2023-10-22 RX ADMIN — HYDROMORPHONE HYDROCHLORIDE 4 MG: 2 TABLET ORAL at 13:00

## 2023-10-22 RX ADMIN — METHADONE HYDROCHLORIDE 5 MG: 5 TABLET ORAL at 05:15

## 2023-10-22 RX ADMIN — SENNOSIDES AND DOCUSATE SODIUM 1 TABLET: 50; 8.6 TABLET ORAL at 21:38

## 2023-10-22 RX ADMIN — MULTIPLE VITAMINS W/ MINERALS TAB 1 TABLET: TAB at 10:19

## 2023-10-22 ASSESSMENT — ACTIVITIES OF DAILY LIVING (ADL)
ADLS_ACUITY_SCORE: 31

## 2023-10-22 NOTE — PROGRESS NOTES
[facility]    Medicine Progress Note - Hospitalist Service    Date of Admission:  9/29/2023    Assessment & Plan   Linda Loredo is a 78 year old female admitted on 9/29/2023. She was transferred from [facility] for L heel osteomyelitis.  She underwent BKA on 10/7/23 and further debridement on 10/14/23.    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  Hx venous stasis ulcers. Presents with L heel decubitus ulcer with osteo. Wound with multiple organisms incl pseudomonas, morganella, e faecalis, bordetella, proteus status underwent debridement 9/26 by podiatry.   Blood cultures done on 9/25/2023 have been negative.  ALMA w/ poor flow, podiatry at outside hospital requested transfer where there are vascular services.  Patient is currently on vancomycin and meropenem as per prior recommendations and status post PICC line placement on 9/27 and infectious disease, podiatry and vascular surgery team assisting.  After considering options she decided to proceed with amputation.   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 Does well with warning prior to planned dressing changes, premedication with oral dilaudid ~30min prior, then IV dilaudid 15 min prior (ensure that all of these meds are not being given at same time as methadone)  --  outpatient follow up with vascular, first appt 11/13, then 12/11/23  --  Monitor hgb, transfuse if <7  --  10/17 resumed low dose torsemide 15mg once daily (instead of BID) with stable renal function. Given one time 20meq KCl on 10/20  --  planning LTACH on discharge, likely bed available week of oct 23  -- vascular surgery feels heeling will be a months long process, currently they are unable to close the stump. They recommend discharge to LTACH for wound cares and they will follow monthly for wound checks.      Acute on chronic pain syndrome:   -- Continue methadone 10 mg daily midday and 5 mg TID  -- IV and PO dilaudid prn for breakthrough pain  -- Patient was also evaluated by Psych, no concern for depression, they encouraged patient to continue to use her coping skills to adjust after this surgery   -- palliative care appreciated     Urine culture positive for E faecalis, Staph aureus and E. Coli  -- UA done at outside hospital on 9/23 showed above, she completed >10 day course of antibiotics with vancomycin, ceftriaxone, meropenem on 10/8     DM II:  [PTA degludec 35 units at HS, metformin 2000 mg daily, ozempic]  A1c 7.0 9/23/23. Has been on insulin pump in the past but this was changed to tresiba 8/2023.  -- hold metformin, Ozempic   -- medium resistance SSI  -- Tresiba 12 units qhs     Atrial fibrillation  HTN  HLD:  [PTA- warfarin, simvastatin 20 mg daily, lisinopril 5 mg daily]  -- Continue with statin  -- stopped heparin gtt on 10/20 due to INR >goal x2 days  -- continue warfarin per pharmacy (held doses 10/19 and 10/20)     CKD III:  --Baseline creatinine 1-1.2.   --Stable, monitor occasionally     Failure to thrive:  As above, called by police for welfare check. ? Safety at home. SW had seen at [facility].      Obesity:  -- Complicates care     BERLIN:  -- continue home cpap          Diet: Snacks/Supplements Adult: Glucerna; Between Meals  Consistent Carbohydrate Diet Moderate Consistent Carb (60 g CHO per Meal) Diet    DVT Prophylaxis: heparin gtt, warfarin  Braga Catheter: Not present  Lines: PRESENT      PICC  "10/09/23 Triple Lumen Right Basilic-Site Assessment: WDL      Cardiac Monitoring: None  Code Status: Full Code      Clinically Significant Risk Factors                  # Hypertension: Noted on problem list       # DMII: A1C = 7.0 % (Ref range: <5.7 %) within past 6 months   # Severe Obesity: Estimated body mass index is 40.38 kg/m  as calculated from the following:    Height as of this encounter: 1.676 m (5' 6\").    Weight as of this encounter: 113.5 kg (250 lb 3.2 oz).             Disposition Plan     Expected Discharge Date: 10/24/2023    Discharge Delays: Procedure Pending (enter procedure & time in comments)  Destination: inpatient rehabilitation facility  Discharge Comments: amputation closure in a few weeks, waiting for more healing  TCU once pain controlled with only orals            Pierce Aly MD  Hospitalist Service  [facility]  Securely message with [employer] (more info)  Text page via [employer] Paging/Directory   ______________________________________________________________________    Interval History   Patient seen and examined. Discussed with RN, no concerns. Daughter prashant on phone during visit and discussed with her. Continue to await LTACH.      Physical Exam   Vital Signs: Temp: 97.7  F (36.5  C) Temp src: Oral BP: (!) 140/73 Pulse: 75   Resp: 18 SpO2: 95 % O2 Device: None (Room air)    Weight: 250 lbs 3.2 oz    Constitutional: Awake, alert, cooperative, no acute distress  Respiratory: Clear to auscultation bilaterally, no crackles or wheezing  Cardiovascular: Regular rate and rhythm, normal S1 and S2, and no murmur noted  GI: Normal bowel sounds, soft, non-distended, non-tender  Skin/Integumen: No rashes, no cyanosis, no edema  Other:  Left lower extremity amputation, serous drainage noted    Medical Decision Making       28 MINUTES SPENT BY ME on the date of service doing chart review, history, exam, documentation & further activities per the note.      Data     I have " personally reviewed the following data over the past 24 hrs:    N/A  \   N/A   / N/A     N/A N/A N/A /  122 (H)   N/A N/A N/A \     INR:  2.58 (H) PTT:  N/A   D-dimer:  N/A Fibrinogen:  N/A       Imaging results reviewed over the past 24 hrs:   No results found for this or any previous visit (from the past 24 hour(s)).

## 2023-10-22 NOTE — PLAN OF CARE
Goal Outcome Evaluation:      Plan of Care Reviewed With: patient    Overall Patient Progress: no changeOverall Patient Progress: no change    Shift: 7p - 11p  Surgery/POD#: POD 7 from an I&D of LLE. POD 14 from a left BKA.  Behavior & Aggression: Green  Fall Risk: Yes  Orientation: A&O x4, forgetful  Tele: NA  ABNL Labs: INR 2.54, recheck daily; glucose 165  Pain Management: scheduled methadone, PRN dilaudid for dressing changes   Bowel/Bladder: continent of B&B, requests purewick PRN for voiding, bowel sounds normoactive, passing flatus, last BM 10/20  Drains: NA  Lines: triple lumen PICC SL'd  Skin: LLE daily dressing change, buttocks daily wound care, perineum redness  Diet: mod carb  Activity Level: x2 lift, pt ref repositioning, but shifts weight in her bed  Tests/Procedures: NA  Anticipated  DC Date: pending, LTACH week of 10/23  Significant Information: dressing changes done for 10/21

## 2023-10-22 NOTE — PLAN OF CARE
Shift: 10/22 7a-7p  Surgery/POD#: 15 from a left BKA.  Behavior & Aggression: Green  Fall Risk: Yes  Orientation: A&O x4, forgetful  ABNL Labs: n/a  Pain Management: scheduled methadone, PRN dilaudid for dressing changes  Bowel/Bladder: cont BB, requests purewick PRN for voiding  Drains: NA  Lines: triple lumen PICC SL  Skin: LLE daily dressing change, buttocks daily wound care  Diet: mod carb  Activity Level: x2 lift to commode, otherwise t/r in bed well  Tests/Procedures: NA  Anticipated  DC Date: [facility] monday  Significant Information:

## 2023-10-23 ENCOUNTER — APPOINTMENT (OUTPATIENT)
Dept: PHYSICAL THERAPY | Facility: CLINIC | Age: 78
DRG: 617 | End: 2023-10-23
Attending: HOSPITALIST
Payer: COMMERCIAL

## 2023-10-23 LAB
GLUCOSE BLDC GLUCOMTR-MCNC: 108 MG/DL (ref 70–99)
GLUCOSE BLDC GLUCOMTR-MCNC: 125 MG/DL (ref 70–99)
GLUCOSE BLDC GLUCOMTR-MCNC: 131 MG/DL (ref 70–99)
GLUCOSE BLDC GLUCOMTR-MCNC: 136 MG/DL (ref 70–99)
GLUCOSE BLDC GLUCOMTR-MCNC: 159 MG/DL (ref 70–99)
INR PPP: 2.83 (ref 0.85–1.15)

## 2023-10-23 PROCEDURE — 97110 THERAPEUTIC EXERCISES: CPT | Mod: GP | Performed by: PHYSICAL THERAPIST

## 2023-10-23 PROCEDURE — 250N000011 HC RX IP 250 OP 636: Mod: JZ | Performed by: INTERNAL MEDICINE

## 2023-10-23 PROCEDURE — 250N000013 HC RX MED GY IP 250 OP 250 PS 637: Performed by: HOSPITALIST

## 2023-10-23 PROCEDURE — 250N000013 HC RX MED GY IP 250 OP 250 PS 637: Performed by: STUDENT IN AN ORGANIZED HEALTH CARE EDUCATION/TRAINING PROGRAM

## 2023-10-23 PROCEDURE — 99207 PR NO CHARGE SIGN-OFF PS: CPT | Performed by: NURSE PRACTITIONER

## 2023-10-23 PROCEDURE — 99207 PR NO BILLABLE SERVICE THIS VISIT: CPT | Performed by: INTERNAL MEDICINE

## 2023-10-23 PROCEDURE — 250N000011 HC RX IP 250 OP 636: Performed by: HOSPITALIST

## 2023-10-23 PROCEDURE — 120N000001 HC R&B MED SURG/OB

## 2023-10-23 PROCEDURE — 85610 PROTHROMBIN TIME: CPT | Performed by: STUDENT IN AN ORGANIZED HEALTH CARE EDUCATION/TRAINING PROGRAM

## 2023-10-23 PROCEDURE — 250N000013 HC RX MED GY IP 250 OP 250 PS 637: Performed by: INTERNAL MEDICINE

## 2023-10-23 RX ORDER — WARFARIN SODIUM 2.5 MG/1
2.5 TABLET ORAL
Status: COMPLETED | OUTPATIENT
Start: 2023-10-23 | End: 2023-10-23

## 2023-10-23 RX ADMIN — TORSEMIDE 15 MG: 10 TABLET ORAL at 08:21

## 2023-10-23 RX ADMIN — HYDROMORPHONE HYDROCHLORIDE 0.5 MG: 1 INJECTION, SOLUTION INTRAMUSCULAR; INTRAVENOUS; SUBCUTANEOUS at 13:54

## 2023-10-23 RX ADMIN — SENNOSIDES AND DOCUSATE SODIUM 2 TABLET: 50; 8.6 TABLET ORAL at 08:21

## 2023-10-23 RX ADMIN — METHADONE HYDROCHLORIDE 5 MG: 5 TABLET ORAL at 06:15

## 2023-10-23 RX ADMIN — ASPIRIN 81 MG: 81 TABLET, COATED ORAL at 08:22

## 2023-10-23 RX ADMIN — METHADONE HYDROCHLORIDE 5 MG: 5 TABLET ORAL at 20:26

## 2023-10-23 RX ADMIN — SENNOSIDES AND DOCUSATE SODIUM 1 TABLET: 50; 8.6 TABLET ORAL at 20:26

## 2023-10-23 RX ADMIN — WARFARIN SODIUM 2.5 MG: 2.5 TABLET ORAL at 17:49

## 2023-10-23 RX ADMIN — HYDROMORPHONE HYDROCHLORIDE 4 MG: 2 TABLET ORAL at 13:18

## 2023-10-23 RX ADMIN — ATORVASTATIN CALCIUM 40 MG: 40 TABLET, FILM COATED ORAL at 20:27

## 2023-10-23 RX ADMIN — METHADONE HYDROCHLORIDE 10 MG: 10 TABLET ORAL at 15:39

## 2023-10-23 RX ADMIN — METHADONE HYDROCHLORIDE 5 MG: 5 TABLET ORAL at 10:19

## 2023-10-23 RX ADMIN — MULTIPLE VITAMINS W/ MINERALS TAB 1 TABLET: TAB at 08:22

## 2023-10-23 ASSESSMENT — ACTIVITIES OF DAILY LIVING (ADL)
ADLS_ACUITY_SCORE: 31

## 2023-10-23 NOTE — PLAN OF CARE
Shift: 7p - 7a  Surgery/POD#: POD 9 from an I&D of LLE, POD 16 from a left BKA.  Behavior & Aggression: Green  Fall Risk: Yes  Orientation: A&O x4  ABNL VS/O2: VSS on RA  Tele: NA  ABNL Labs: INR 2.58, recheck daily; glucose 175 & 136 overnight  Pain Management: scheduled methadone, PRN dilaudid for dressing changes  Bowel/Bladder: continent of B&B, requests purewick PRN for voiding, bowel sounds normoactive, passing flatus, last BM 10/20  Drains: NA  Lines: triple lumen PICC SL'd  Skin: LLE daily dressing change, buttocks daily wound care, perineum redness  Diet: mod carb  Activity Level: x2 lift, pt ref repositioning but shifts weight in her bed  Tests/Procedures: NA  Anticipated  DC Date: LTACH this week  Significant Information:

## 2023-10-23 NOTE — PLAN OF CARE
Shift: 10/23/23 0700 -1500    Surgery/POD#: POD 9 from an I&D of LLE, POD 16 from a left BKA.  Behavior & Aggression: Green  Fall Risk: Yes  Orientation: A&O x4  ABNL VS/O2: VSS on RA  Tele: N/A  ABNL Labs: INR 2.83, recheck daily; glucose 108 & 125  Pain Management: Scheduled methadone, PRN dilaudid for dressing changes  Bowel/Bladder: Continent of B&B, requests purewick PRN for voiding, bowel sounds normoactive, passing flatus, last BM 10/20  Drains: N/A  Lines: Triple lumen PICC SL'd. No blood return, MD paged for alteplase order. CHG bath done.   Skin: LLE daily dressing change, buttocks daily wound care, perineum redness  Diet: Mod carb  Activity Level: Ax2 lift, weight shifting   Tests/Procedures: N/A  Anticipated  DC Date: LTACH this week  Significant Information: BKA dressing change done per POC

## 2023-10-23 NOTE — PROGRESS NOTES
Date & Time: 2717-0976  Surgery/POD#: POD 16 from a left BKA, POD 9 from I&D of LLE   Behavior & Aggression: Green  Fall Risk: Yes  Orientation:A/Ox4  ABNL VS/O2:VSS on RA  ABNL Labs: NA  Pain Management:Scheduled Methadone    Bowel/Bladder: Continent of B/B  Drains: NA  Diet:Mod Carb  Activity Level: 2 assist, Lift  Anticipated DC Date: Possible discharge 10/24 or 10/25 LTACH  Significant Information: LLE dressing CDI,  BS checks, PICC with blood return noted

## 2023-10-23 NOTE — PROGRESS NOTES
Care Management Follow Up    Length of Stay (days): 24    Expected Discharge Date: 10/24/2023     Concerns to be Addressed: adjustment to diagnosis/illness, coping/stress, discharge planning     Patient plan of care discussed at interdisciplinary rounds: Yes    Anticipated Discharge Disposition: LTACH     Anticipated Discharge Services:    Anticipated Discharge DME:      Patient/family educated on Medicare website which has current facility and service quality ratings:    Education Provided on the Discharge Plan: Yes  Patient/Family in Agreement with the Plan: yes    Referrals Placed by CM/SW:    Private pay costs discussed: Not applicable    Additional Information:  Writer called LTACH liaison for an update and they will know more later today about potential admit for possibly Tues or Wed. Will update. Writer following.     LIZY Sol

## 2023-10-23 NOTE — PROGRESS NOTES
"Palliative follow up. Note restorative plan of care with plan for LTACH ideally early this week. Additional support can be provided at LTACH. Our team will sign off. Dr. Zaragoza aware and in agreement. Thanks.    Jannet ROSA, ARNAV  Palliative Medicine   [facility]  Securely message with [employer]   *If you are having trouble locating my name, please ensure \"global\" is checked under contacts tab, within the sites button    "

## 2023-10-24 ENCOUNTER — HOSPITAL ENCOUNTER (INPATIENT)
Facility: CLINIC | Age: 78
LOS: 22 days | Discharge: NURSING, REHAB OR LONG-TERM CARE | DRG: 560 | End: 2023-11-15
Attending: INTERNAL MEDICINE | Admitting: STUDENT IN AN ORGANIZED HEALTH CARE EDUCATION/TRAINING PROGRAM
Payer: COMMERCIAL

## 2023-10-24 VITALS
SYSTOLIC BLOOD PRESSURE: 130 MMHG | OXYGEN SATURATION: 95 % | BODY MASS INDEX: 39.4 KG/M2 | WEIGHT: 245.15 LBS | HEART RATE: 76 BPM | RESPIRATION RATE: 18 BRPM | DIASTOLIC BLOOD PRESSURE: 70 MMHG | HEIGHT: 66 IN | TEMPERATURE: 97.3 F

## 2023-10-24 DIAGNOSIS — Z79.01 LONG TERM CURRENT USE OF ANTICOAGULANT THERAPY: ICD-10-CM

## 2023-10-24 DIAGNOSIS — D68.32 WARFARIN-INDUCED COAGULOPATHY (H): ICD-10-CM

## 2023-10-24 DIAGNOSIS — Z79.4 TYPE 2 DIABETES MELLITUS WITH COMPLICATION, WITH LONG-TERM CURRENT USE OF INSULIN (H): ICD-10-CM

## 2023-10-24 DIAGNOSIS — L30.9 DERMATITIS: ICD-10-CM

## 2023-10-24 DIAGNOSIS — T14.8XXA OPEN WOUND: Primary | ICD-10-CM

## 2023-10-24 DIAGNOSIS — K59.03 DRUG-INDUCED CONSTIPATION: ICD-10-CM

## 2023-10-24 DIAGNOSIS — F11.20 OPIOID DEPENDENCE, UNCOMPLICATED (H): ICD-10-CM

## 2023-10-24 DIAGNOSIS — G89.4 CHRONIC PAIN SYNDROME: ICD-10-CM

## 2023-10-24 DIAGNOSIS — R11.0 NAUSEA: ICD-10-CM

## 2023-10-24 DIAGNOSIS — T45.515A WARFARIN-INDUCED COAGULOPATHY (H): ICD-10-CM

## 2023-10-24 DIAGNOSIS — E11.8 TYPE 2 DIABETES MELLITUS WITH COMPLICATION, WITH LONG-TERM CURRENT USE OF INSULIN (H): ICD-10-CM

## 2023-10-24 PROBLEM — M86.9 OSTEOMYELITIS (H): Status: ACTIVE | Noted: 2023-10-24

## 2023-10-24 LAB
GLUCOSE BLDC GLUCOMTR-MCNC: 122 MG/DL (ref 70–99)
GLUCOSE BLDC GLUCOMTR-MCNC: 146 MG/DL (ref 70–99)
GLUCOSE BLDC GLUCOMTR-MCNC: 148 MG/DL (ref 70–99)
GLUCOSE BLDC GLUCOMTR-MCNC: 95 MG/DL (ref 70–99)
INR PPP: 2.86 (ref 0.85–1.15)
INR PPP: 3.1 (ref 0.85–1.15)

## 2023-10-24 PROCEDURE — 250N000013 HC RX MED GY IP 250 OP 250 PS 637: Performed by: HOSPITALIST

## 2023-10-24 PROCEDURE — 250N000011 HC RX IP 250 OP 636: Performed by: HOSPITALIST

## 2023-10-24 PROCEDURE — 99239 HOSP IP/OBS DSCHRG MGMT >30: CPT | Performed by: INTERNAL MEDICINE

## 2023-10-24 PROCEDURE — 250N000012 HC RX MED GY IP 250 OP 636 PS 637: Performed by: STUDENT IN AN ORGANIZED HEALTH CARE EDUCATION/TRAINING PROGRAM

## 2023-10-24 PROCEDURE — 250N000013 HC RX MED GY IP 250 OP 250 PS 637: Performed by: STUDENT IN AN ORGANIZED HEALTH CARE EDUCATION/TRAINING PROGRAM

## 2023-10-24 PROCEDURE — 85610 PROTHROMBIN TIME: CPT | Performed by: STUDENT IN AN ORGANIZED HEALTH CARE EDUCATION/TRAINING PROGRAM

## 2023-10-24 PROCEDURE — 999N000157 HC STATISTIC RCP TIME EA 10 MIN

## 2023-10-24 PROCEDURE — 999N000040 HC STATISTIC CONSULT NO CHARGE VASC ACCESS

## 2023-10-24 PROCEDURE — 250N000013 HC RX MED GY IP 250 OP 250 PS 637: Performed by: INTERNAL MEDICINE

## 2023-10-24 PROCEDURE — 120N000017 HC R&B RESPIRATORY CARE

## 2023-10-24 RX ORDER — METHADONE HYDROCHLORIDE 10 MG/1
10 TABLET ORAL DAILY
Status: CANCELLED | OUTPATIENT
Start: 2023-10-24

## 2023-10-24 RX ORDER — NALOXONE HYDROCHLORIDE 0.4 MG/ML
0.2 INJECTION, SOLUTION INTRAMUSCULAR; INTRAVENOUS; SUBCUTANEOUS
Status: DISCONTINUED | OUTPATIENT
Start: 2023-10-24 | End: 2023-11-15 | Stop reason: HOSPADM

## 2023-10-24 RX ORDER — DEXTROSE MONOHYDRATE 25 G/50ML
25-50 INJECTION, SOLUTION INTRAVENOUS
Status: CANCELLED | OUTPATIENT
Start: 2023-10-24

## 2023-10-24 RX ORDER — POLYETHYLENE GLYCOL 3350 17 G/17G
17 POWDER, FOR SOLUTION ORAL DAILY PRN
Status: DISCONTINUED | OUTPATIENT
Start: 2023-10-24 | End: 2023-10-25

## 2023-10-24 RX ORDER — NALOXONE HYDROCHLORIDE 0.4 MG/ML
0.4 INJECTION, SOLUTION INTRAMUSCULAR; INTRAVENOUS; SUBCUTANEOUS
Status: CANCELLED | OUTPATIENT
Start: 2023-10-24

## 2023-10-24 RX ORDER — ONDANSETRON 4 MG/1
4 TABLET, ORALLY DISINTEGRATING ORAL EVERY 6 HOURS PRN
Status: DISCONTINUED | OUTPATIENT
Start: 2023-10-24 | End: 2023-11-15 | Stop reason: HOSPADM

## 2023-10-24 RX ORDER — NICOTINE POLACRILEX 4 MG
15-30 LOZENGE BUCCAL
Status: DISCONTINUED | OUTPATIENT
Start: 2023-10-24 | End: 2023-10-25

## 2023-10-24 RX ORDER — HYDROMORPHONE HYDROCHLORIDE 1 MG/ML
0.3 INJECTION, SOLUTION INTRAMUSCULAR; INTRAVENOUS; SUBCUTANEOUS
Status: DISCONTINUED | OUTPATIENT
Start: 2023-10-24 | End: 2023-11-08

## 2023-10-24 RX ORDER — MULTIPLE VITAMINS W/ MINERALS TAB 9MG-400MCG
1 TAB ORAL DAILY
Status: DISCONTINUED | OUTPATIENT
Start: 2023-10-25 | End: 2023-11-15 | Stop reason: HOSPADM

## 2023-10-24 RX ORDER — AMOXICILLIN 250 MG
1 CAPSULE ORAL 2 TIMES DAILY PRN
Status: CANCELLED | OUTPATIENT
Start: 2023-10-24

## 2023-10-24 RX ORDER — WARFARIN SODIUM 2.5 MG/1
2.5 TABLET ORAL
Status: DISCONTINUED | OUTPATIENT
Start: 2023-10-24 | End: 2023-10-24 | Stop reason: HOSPADM

## 2023-10-24 RX ORDER — POLYETHYLENE GLYCOL 3350 17 G/17G
17 POWDER, FOR SOLUTION ORAL DAILY PRN
Status: CANCELLED | OUTPATIENT
Start: 2023-10-24

## 2023-10-24 RX ORDER — NALOXONE HYDROCHLORIDE 0.4 MG/ML
0.2 INJECTION, SOLUTION INTRAMUSCULAR; INTRAVENOUS; SUBCUTANEOUS
Status: CANCELLED | OUTPATIENT
Start: 2023-10-24

## 2023-10-24 RX ORDER — NICOTINE POLACRILEX 4 MG
15-30 LOZENGE BUCCAL
Status: CANCELLED | OUTPATIENT
Start: 2023-10-24

## 2023-10-24 RX ORDER — ATORVASTATIN CALCIUM 40 MG/1
40 TABLET, FILM COATED ORAL EVERY EVENING
Status: CANCELLED | OUTPATIENT
Start: 2023-10-24

## 2023-10-24 RX ORDER — WARFARIN SODIUM 2.5 MG/1
2.5 TABLET ORAL
Status: CANCELLED | OUTPATIENT
Start: 2023-10-24 | End: 2023-10-24

## 2023-10-24 RX ORDER — AMOXICILLIN 250 MG
2 CAPSULE ORAL 2 TIMES DAILY PRN
Status: CANCELLED | OUTPATIENT
Start: 2023-10-24

## 2023-10-24 RX ORDER — WARFARIN SODIUM 2 MG/1
2 TABLET ORAL
Status: COMPLETED | OUTPATIENT
Start: 2023-10-24 | End: 2023-10-24

## 2023-10-24 RX ORDER — DEXTROSE MONOHYDRATE 25 G/50ML
25-50 INJECTION, SOLUTION INTRAVENOUS
Status: DISCONTINUED | OUTPATIENT
Start: 2023-10-24 | End: 2023-10-25

## 2023-10-24 RX ORDER — ASPIRIN 81 MG/1
81 TABLET ORAL DAILY
Status: DISCONTINUED | OUTPATIENT
Start: 2023-10-25 | End: 2023-11-15 | Stop reason: HOSPADM

## 2023-10-24 RX ORDER — MULTIPLE VITAMINS W/ MINERALS TAB 9MG-400MCG
1 TAB ORAL DAILY
Status: CANCELLED | OUTPATIENT
Start: 2023-10-25

## 2023-10-24 RX ORDER — ATORVASTATIN CALCIUM 40 MG/1
40 TABLET, FILM COATED ORAL EVERY EVENING
Status: DISCONTINUED | OUTPATIENT
Start: 2023-10-24 | End: 2023-11-15 | Stop reason: HOSPADM

## 2023-10-24 RX ORDER — NALOXONE HYDROCHLORIDE 0.4 MG/ML
0.4 INJECTION, SOLUTION INTRAMUSCULAR; INTRAVENOUS; SUBCUTANEOUS
Status: DISCONTINUED | OUTPATIENT
Start: 2023-10-24 | End: 2023-11-15 | Stop reason: HOSPADM

## 2023-10-24 RX ORDER — ONDANSETRON 4 MG/1
4 TABLET, ORALLY DISINTEGRATING ORAL EVERY 6 HOURS PRN
Status: CANCELLED | OUTPATIENT
Start: 2023-10-24

## 2023-10-24 RX ORDER — BISACODYL 10 MG
10 SUPPOSITORY, RECTAL RECTAL DAILY PRN
Status: CANCELLED | OUTPATIENT
Start: 2023-10-24

## 2023-10-24 RX ORDER — HYDROMORPHONE HYDROCHLORIDE 1 MG/ML
0.5 INJECTION, SOLUTION INTRAMUSCULAR; INTRAVENOUS; SUBCUTANEOUS
Status: CANCELLED | OUTPATIENT
Start: 2023-10-24

## 2023-10-24 RX ORDER — NICOTINE POLACRILEX 4 MG
15-30 LOZENGE BUCCAL
Status: DISCONTINUED | OUTPATIENT
Start: 2023-10-24 | End: 2023-11-15 | Stop reason: HOSPADM

## 2023-10-24 RX ORDER — METHADONE HYDROCHLORIDE 5 MG/1
5 TABLET ORAL 3 TIMES DAILY
Status: CANCELLED | OUTPATIENT
Start: 2023-10-24

## 2023-10-24 RX ORDER — ASPIRIN 81 MG/1
81 TABLET ORAL DAILY
Status: CANCELLED | OUTPATIENT
Start: 2023-10-25

## 2023-10-24 RX ORDER — ONDANSETRON 2 MG/ML
4 INJECTION INTRAMUSCULAR; INTRAVENOUS EVERY 6 HOURS PRN
Status: CANCELLED | OUTPATIENT
Start: 2023-10-24

## 2023-10-24 RX ORDER — HYDROMORPHONE HYDROCHLORIDE 4 MG/1
4 TABLET ORAL EVERY 4 HOURS PRN
Status: DISCONTINUED | OUTPATIENT
Start: 2023-10-24 | End: 2023-11-06

## 2023-10-24 RX ORDER — HYDROMORPHONE HYDROCHLORIDE 1 MG/ML
0.5 INJECTION, SOLUTION INTRAMUSCULAR; INTRAVENOUS; SUBCUTANEOUS
Status: DISCONTINUED | OUTPATIENT
Start: 2023-10-24 | End: 2023-10-27

## 2023-10-24 RX ORDER — METHADONE HYDROCHLORIDE 10 MG/1
10 TABLET ORAL DAILY
Status: DISCONTINUED | OUTPATIENT
Start: 2023-10-24 | End: 2023-11-15 | Stop reason: HOSPADM

## 2023-10-24 RX ORDER — DEXTROSE MONOHYDRATE 25 G/50ML
25-50 INJECTION, SOLUTION INTRAVENOUS
Status: DISCONTINUED | OUTPATIENT
Start: 2023-10-24 | End: 2023-11-15 | Stop reason: HOSPADM

## 2023-10-24 RX ORDER — SENNOSIDES 8.6 MG
8.6 TABLET ORAL 2 TIMES DAILY PRN
Status: DISCONTINUED | OUTPATIENT
Start: 2023-10-24 | End: 2023-10-25

## 2023-10-24 RX ORDER — AMOXICILLIN 250 MG
2 CAPSULE ORAL 2 TIMES DAILY
Status: CANCELLED | OUTPATIENT
Start: 2023-10-24

## 2023-10-24 RX ORDER — ACETAMINOPHEN 325 MG/1
650 TABLET ORAL EVERY 4 HOURS PRN
Status: CANCELLED | OUTPATIENT
Start: 2023-10-24

## 2023-10-24 RX ORDER — METHADONE HYDROCHLORIDE 5 MG/1
5 TABLET ORAL 3 TIMES DAILY
Status: DISCONTINUED | OUTPATIENT
Start: 2023-10-24 | End: 2023-11-15 | Stop reason: HOSPADM

## 2023-10-24 RX ORDER — ACETAMINOPHEN 325 MG/1
650 TABLET ORAL EVERY 4 HOURS PRN
Status: DISCONTINUED | OUTPATIENT
Start: 2023-10-24 | End: 2023-11-15 | Stop reason: HOSPADM

## 2023-10-24 RX ORDER — HYDROMORPHONE HYDROCHLORIDE 1 MG/ML
0.3 INJECTION, SOLUTION INTRAMUSCULAR; INTRAVENOUS; SUBCUTANEOUS
Status: CANCELLED | OUTPATIENT
Start: 2023-10-24

## 2023-10-24 RX ORDER — HYDROMORPHONE HYDROCHLORIDE 2 MG/1
4 TABLET ORAL EVERY 4 HOURS PRN
Status: CANCELLED | OUTPATIENT
Start: 2023-10-24

## 2023-10-24 RX ADMIN — HYDROMORPHONE HYDROCHLORIDE 4 MG: 2 TABLET ORAL at 11:44

## 2023-10-24 RX ADMIN — HYDROMORPHONE HYDROCHLORIDE 0.5 MG: 1 INJECTION, SOLUTION INTRAMUSCULAR; INTRAVENOUS; SUBCUTANEOUS at 12:11

## 2023-10-24 RX ADMIN — INSULIN ASPART 1 UNITS: 100 INJECTION, SOLUTION INTRAVENOUS; SUBCUTANEOUS at 19:28

## 2023-10-24 RX ADMIN — METHADONE HYDROCHLORIDE 10 MG: 10 TABLET ORAL at 21:35

## 2023-10-24 RX ADMIN — MICONAZOLE NITRATE: 20 POWDER TOPICAL at 21:35

## 2023-10-24 RX ADMIN — SENNOSIDES AND DOCUSATE SODIUM 2 TABLET: 50; 8.6 TABLET ORAL at 08:54

## 2023-10-24 RX ADMIN — WARFARIN SODIUM 2 MG: 2 TABLET ORAL at 19:27

## 2023-10-24 RX ADMIN — TORSEMIDE 15 MG: 10 TABLET ORAL at 08:54

## 2023-10-24 RX ADMIN — MULTIPLE VITAMINS W/ MINERALS TAB 1 TABLET: TAB at 08:55

## 2023-10-24 RX ADMIN — SENNOSIDES 8.6 MG: 8.6 TABLET, FILM COATED ORAL at 21:50

## 2023-10-24 RX ADMIN — INSULIN DEGLUDEC INJECTION 12 UNITS: 100 INJECTION, SOLUTION SUBCUTANEOUS at 21:38

## 2023-10-24 RX ADMIN — METHADONE HYDROCHLORIDE 5 MG: 5 TABLET ORAL at 06:03

## 2023-10-24 RX ADMIN — ATORVASTATIN CALCIUM 40 MG: 40 TABLET, FILM COATED ORAL at 21:37

## 2023-10-24 RX ADMIN — ASPIRIN 81 MG: 81 TABLET, COATED ORAL at 08:54

## 2023-10-24 ASSESSMENT — ACTIVITIES OF DAILY LIVING (ADL)
ADLS_ACUITY_SCORE: 35
ADLS_ACUITY_SCORE: 33
ADLS_ACUITY_SCORE: 35
ADLS_ACUITY_SCORE: 31
DOING_ERRANDS_INDEPENDENTLY_DIFFICULTY: OTHER (SEE COMMENTS)
ADLS_ACUITY_SCORE: 33
ADLS_ACUITY_SCORE: 31
ADLS_ACUITY_SCORE: 37
ADLS_ACUITY_SCORE: 35
ADLS_ACUITY_SCORE: 35
WALKING_OR_CLIMBING_STAIRS: TRANSFERRING DIFFICULTY, ASSISTANCE 1 PERSON
ADLS_ACUITY_SCORE: 35
ADLS_ACUITY_SCORE: 37

## 2023-10-24 NOTE — PROGRESS NOTES
"SPIRITUAL HEALTH SERVICES - Progress Note   SH Gen Surg    Referral Source: follow up from previous consult   Saw pt Margaret Loredo and her daughter for a short visit prior to discharge. Margaret expressed she is both \"scared\" and \"excited\" for the next leg of her journey, as well as \"grateful\" for the care she has already received. She says this hospital stay has been a \"wake up call\" and she is \"listening.\" She asked for a prayer for her journey on to \"next steps,\" which we shared together.    Plan: Spiritual Health remains available      Loretta Cat  Chaplain Resident    [facility] routine referrals *[phone]    [facility] available 24/7 for emergent requests/referrals, either by paging the on-call  or by entering an ASAP/STAT consult in Epic (this will also page the on-call ).    "

## 2023-10-24 NOTE — DISCHARGE SUMMARY
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 Coli  Diabetes  Atrial fibrillation  Hypertension  Dyslipidemia  CKD III  Obesity  BERLIN    Clinically Significant Risk Factors     # DMII: A1C = 7.0 % (Ref range: <5.7 %) within past 6 months  # Obesity: Estimated body mass index is 39.57 kg/m  as calculated from the following:    Height as of this encounter: 1.676 m (5' 6\").    Weight as of this encounter: 111.2 kg (245 lb 2.4 oz).       Follow-ups Needed After Discharge   N/A    Unresulted Labs Ordered in the Past 30 Days of this Admission       No orders found from 8/30/2023 to 9/30/2023.        These results will be followed up by N/A    Discharge Disposition   Discharged to [facility]  Condition at discharge: Stable    Hospital Course   Linda Loredo is a 78 year old female admitted on 9/29/2023. She was transferred from [facility] for L heel osteomyelitis.  She underwent BKA on 10/7/23 and further debridement on 10/14/23.     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  Hx venous stasis ulcers. Presents with " L heel decubitus ulcer with osteo. Wound with multiple organisms incl pseudomonas, morganella, e faecalis, bordetella, proteus status underwent debridement 9/26 by podiatry.   Blood cultures done on 9/25/2023 have been negative.  ALMA w/ poor flow, podiatry at outside hospital requested transfer where there are vascular services.  Patient is currently on vancomycin and meropenem as per prior recommendations and status post PICC line placement on 9/27 and infectious disease, podiatry and vascular surgery team assisting.  After considering options she decided to proceed with amputation.   *10/7/23 Left guillotine below knee amputation  *10/10 1u PRBCs given for hgb 6.6  *Finished IV meropenem and IV vanco on 10/08 after initial surgery   *10/14/23 debridement of left BKA, Unable to close BKA at the time  - has significant discomfort with dressing changes. Does well with warning prior to planned dressing changes, premedication with oral dilaudid ~30min prior, then IV dilaudid 15 min prior (ensure that all of these meds are not being given at same time as methadone)  --  outpatient follow up with vascular, first appt 11/13, then 12/11/23  --  Monitor hgb, transfuse if <7  -- 10/17 resumed low dose torsemide 15mg once daily (instead of BID) with stable renal function.  -- vascular surgery feels heeling will be a months long process, currently they are unable to close the stump. They recommended discharge to [facility] for wound cares and they will follow monthly for wound checks.      Acute on chronic pain syndrome  -- Continue methadone 10 mg daily midday and 5 mg TID  -- IV and PO dilaudid prn for breakthrough pain  -- Patient was also evaluated by Psych, no concern for depression, they encouraged patient to continue to use her coping skills to adjust after this surgery      Urine culture positive for E faecalis, Staph aureus and E. Coli  -- UA done at outside hospital on 9/23 showed above, she completed >10 day course of  antibiotics with vancomycin, ceftriaxone, meropenem on 10/8     Insulin Dependent Type 2 diabetes  [PTA degludec 35 units at HS, metformin 2000 mg daily, ozempic]  A1c 7.0 9/23/23. Has been on insulin pump in the past but this was changed to tresiba 8/2023.  -- holding metformin, Ozempic   -- medium resistance SSI  -- Tresiba 12 units qhs     Atrial fibrillation  Hypertension  Dyslipidemia  [PTA- warfarin, simvastatin 20 mg daily, lisinopril 5 mg daily]  -- Continue with statin  -- stopped heparin gtt on 10/20 due to INR >goal x2 days  -- continue warfarin per pharmacy (held doses 10/19 and 10/20)     CKD III  --Baseline creatinine 1-1.2.   --Stable, monitor occasionally     Failure to thrive  As above, called by police for welfare check. ? Safety at home. SW had seen at [facility].      Obesity  -- Complicates care     [place]  -- continue home cpap    Consultations This Hospital Stay   PHARMACY TO DOSE VANCO  PHARMACY TO DOSE WARFARIN  PHYSICAL THERAPY ADULT IP CONSULT  PHYSICAL THERAPY ADULT IP CONSULT  CARE MANAGEMENT / SOCIAL WORK IP CONSULT  VASCULAR SURGERY IP CONSULT  PODIATRY IP CONSULT  INFECTIOUS DISEASES IP CONSULT  WOUND OSTOMY CONTINENCE NURSE  IP CONSULT  VASCULAR ACCESS ADULT IP CONSULT  WOUND OSTOMY CONTINENCE NURSE  IP CONSULT  PHARMACY IP CONSULT  VASCULAR ACCESS ADULT IP CONSULT  VASCULAR ACCESS ADULT IP CONSULT  WOUND OSTOMY CONTINENCE NURSE  IP CONSULT  VASCULAR ACCESS ADULT IP CONSULT  VASCULAR ACCESS ADULT IP CONSULT  VASCULAR ACCESS ADULT IP CONSULT  VASCULAR ACCESS ADULT IP CONSULT  VASCULAR SURGERY IP CONSULT  CARE MANAGEMENT / SOCIAL WORK IP CONSULT  VASCULAR ACCESS ADULT IP CONSULT  VASCULAR ACCESS ADULT IP CONSULT  VASCULAR ACCESS ADULT IP CONSULT  VASCULAR ACCESS ADULT IP CONSULT  PSYCHIATRY IP CONSULT  PHARMACY TO DOSE WARFARIN  PALLIATIVE CARE ADULT IP CONSULT  PHYSICAL THERAPY ADULT IP CONSULT    Code Status   Full Code    Time Spent on this Encounter   I, Jessica Zaragoza MD, personally  saw the patient today and spent greater than 30 minutes discharging this patient.       Jessica Zaragoza MD  [facility]  [address] [place] [facility] [address]  Phone: [phone]  Fax: [phone]  ______________________________________________________________________    Physical Exam   Vital Signs: Temp: 97.3  F (36.3  C) Temp src: Oral BP: 130/70 Pulse: 76   Resp: 18 SpO2: 95 % O2 Device: None (Room air)    Weight: 245 lbs 2.42 oz  Constitutional: Awake, alert, cooperative, no acute distress  Respiratory: Clear to auscultation bilaterally, no crackles or wheezing  Cardiovascular: Regular rate and rhythm, normal S1 and S2, and no murmur noted  GI: Normal bowel sounds, soft, non-distended, non-tender  Skin/Integumen: No rashes, no cyanosis, no edema  Other:   Left lower extremity amputation, serous drainage noted          Primary Care Physician   Ish Brown    Discharge Orders   No discharge procedures on file.    Significant Results and Procedures   See Epic    Discharge Medications   Current Discharge Medication List        START taking these medications    Details   aspirin 81 MG EC tablet Take 1 tablet (81 mg) by mouth daily    Associated Diagnoses: Hx of BKA, left (H)      atorvastatin (LIPITOR) 40 MG tablet Take 1 tablet (40 mg) by mouth every evening    Associated Diagnoses: Hx of BKA, left (H)      HYDROmorphone (DILAUDID) 4 MG tablet Take 1 tablet (4 mg) by mouth every 4 hours as needed for severe pain    Associated Diagnoses: Hx of BKA, left (H)      miconazole (MICATIN) 2 % external powder Apply topically 2 times daily    Associated Diagnoses: Hx of BKA, left (H)      multivitamin w/minerals (THERA-VIT-M) tablet Take 1 tablet by mouth daily    Associated Diagnoses: Hx of BKA, left (H)      senna-docusate (SENOKOT-S/PERICOLACE) 8.6-50 MG tablet Take 2 tablets by mouth 2 times daily    Associated Diagnoses: Hx of BKA, left (H)           CONTINUE these  medications which have CHANGED    Details   insulin degludec (TRESIBA) 200 UNIT/ML pen Inject 12 Units Subcutaneous at bedtime Around 11-11:30 pm    Associated Diagnoses: Type 2 diabetes mellitus with complication, with long-term current use of insulin (H)      torsemide (DEMADEX) 5 MG tablet Take 3 tablets (15 mg) by mouth daily    Associated Diagnoses: Essential hypertension with goal blood pressure less than 140/90           CONTINUE these medications which have NOT CHANGED    Details   acetaminophen (TYLENOL) 325 MG tablet Take 2 tablets (650 mg) by mouth every 6 hours as needed for mild pain or other (and adjunct with moderate or severe pain or per patient request)    Associated Diagnoses: Pressure injury of skin, unspecified injury stage, unspecified location      metFORMIN (GLUCOPHAGE XR) 500 MG 24 hr tablet Take 2,000 mg by mouth daily (with dinner)      !! methadone (DOLOPHINE) 10 MG tablet Take 1 tablet (10 mg) by mouth daily At 1500.  Take in addition to the 5 mg three times daily dose  Qty: 30 tablet, Refills: 0    Associated Diagnoses: Chronic pain syndrome      !! methadone (DOLOPHINE) 5 MG tablet Take 1 tablet (5 mg) by mouth 3 times daily At 0800, 1200 and 2100.  Take in addition to the 10 mg tablet at 1500.  Qty: 50 tablet, Refills: 0    Associated Diagnoses: Chronic pain syndrome      naloxone (NARCAN) 4 MG/0.1ML nasal spray Spray 1 spray (4 mg) into one nostril alternating nostrils as needed for opioid reversal every 2-3 minutes until assistance arrives  Qty: 0.2 mL, Refills: 1    Associated Diagnoses: Chronic pain syndrome      ONE TOUCH TEST STRIPS TEST   VI use as directed  Qty: 100, Refills: prn    Associated Diagnoses: Type II or unspecified type diabetes mellitus without mention of complication, not stated as uncontrolled      OZEMPIC, 0.25 OR 0.5 MG/DOSE, 2 MG/3ML pen Inject 0.5 mg Subcutaneous once a week On Monday evenings      vitamin C (ASCORBIC ACID) 500 MG tablet Take 500 mg by mouth  daily      warfarin ANTICOAGULANT (COUMADIN) 2.5 MG tablet 21 - 3.75 mg  22 - 3.75 mg  23 - 2.5 mg  24 - 3.75 mg  25 - 2.5 mg  26 - 3.75 mg  27 - 2.5 mg  28 - 3.75 mg       !! - Potential duplicate medications found. Please discuss with provider.        STOP taking these medications       lisinopril (ZESTRIL) 5 MG tablet Comments:   Reason for Stopping:         oxyCODONE-acetaminophen (PERCOCET) 5-325 MG tablet Comments:   Reason for Stopping:         simvastatin (ZOCOR) 20 MG tablet Comments:   Reason for Stopping:             Allergies   Allergies   Allergen Reactions    Prednisone Nausea and Vomiting    Morphine Nausea and Vomiting    Morphine [Fumaric Acid] Nausea and Vomiting

## 2023-10-24 NOTE — PLAN OF CARE
Date & Time: 6978-6438  Surgery/POD#: POD 10 from an I&D of LLE, POD 17 from L BKA  Behavior & Aggression: Green  Fall Risk: Yes  Orientation: A&Ox4  ABNL VS/O2:VSS on RA  Pain Management:Scheduled methadone, PRN dilaudid for dressing changes  Bowel/Bladder: Continent. Uses PRN purewicks for voiding. No BM  Drains: Triple lumen PICC SL. Blood return noted   Diet: Mod carb   Activity Level: Ax2, weight shifting   Anticipated  DC Date: LTACH either Tues or Wed   Significant Information: L HANNAHA dressing CDI.

## 2023-10-24 NOTE — PLAN OF CARE
Physical Therapy Discharge Summary    Reason for therapy discharge:    Discharged to LTACH    Progress towards therapy goal(s). See goals on Care Plan in [facility] electronic health record for goal details.  Goals not met.  Barriers to achieving goals:   discharge from facility.    Therapy recommendation(s):    Continued therapy is recommended.  Rationale/Recommendations:  Pt will benefit from continued skilled PT intervention in order to progress strength, activity tolerance and independence with functional mobility.

## 2023-10-24 NOTE — PROGRESS NOTES
[facility] Medicine Follow-Up  10/23/2023    Unable to see patient today due to to time constraints. Continue current plans with dressing changes, discharge planning.     Please refer to Dr. Aly's note from 10/22/23. No changes in clinical condition in the past 24 hours.     Communicated with daytime nurse. No new concerns. I ordered alteplase for PICC per request.     Still anticipate discharge to LTACH once bed is available. Will call to give sign-out to accepting physician when details are known.     Jessica Zaragoza MD

## 2023-10-24 NOTE — PROGRESS NOTES
Care Management Discharge Note    Discharge Date: 10/24/2023       Discharge Disposition: LTACH    Discharge Services:      Discharge DME:      Discharge Transportation: health plan transportation    Private pay costs discussed: Not applicable    Does the patient's insurance plan have a 3 day qualifying hospital stay waiver?  No    PAS Confirmation Code:    Patient/family educated on Medicare website which has current facility and service quality ratings:      Education Provided on the Discharge Plan: Yes  Persons Notified of Discharge Plans: patient, RN, MD, Vascular Surgery team. Patient notified her daugther  Patient/Family in Agreement with the Plan: yes    Handoff Referral Completed: No    Additional Information:  Discharge today to [facility]. Stretcher transport coordinated for 6988-7117 service window.     Stephanie Gomes RN   [facility]   Phone [phone] or [phone]

## 2023-10-24 NOTE — PROGRESS NOTES
[facility]  ([address])    Margaret Loredo has been accepted for admission to [facility] today.       Ride: [id number]    RN to RN report: Please call [address] at [phone] for nurse to nurse report.before the pt discharges.     Accepting MD: Dr. Miguel Shannon. Please contact Dr. Shannon for a provider to provider report before the pt discharges.    Documentation needed prior to discharge: A visible discharge summary and discharge/readmission orders           Misti Moralez, PT  [facility] Referral Specialist    [facility]      [address] [address]  Admissions Office: [phone]  Fax: [phone]     CONFIDENTIAL Protected under [place] Statute  145.61 et seq

## 2023-10-24 NOTE — PROGRESS NOTES
Order came through for pt to use Cpap.  Pt states she has not used cpap in 30 years and does not to use it.  Will inform doctor and ask to discontinue.

## 2023-10-24 NOTE — PHARMACY-ADMISSION MEDICATION HISTORY
Pharmacy Note: LTACH Admission Drug Regimen Review    Initial admission medication history was completed at [facility]. Please see Medication scribe - Admission Medication History note from 09/23/2023     Medication orders were signed & held for discharge from transferring facility? Yes    Changes made to PTA medication list:  Added:    Aspirin 81mg EC daily    Atorvastatin 40mg daily    Miconazole powder bid    Senna -s 2 tabs bid    Hydromorphone 4mg q4h prn severe pain    MVI/min daily     Deleted:     Lisinopril 10mg daily   Percocet 5/325 1-2 tabs q6h prn leg pain   Simvastatin 20mg at bedtime   Torsemide 15mg bid      Changed:    Tresiba 35 unit at bedtime to 12 units at bedtime          Admission drug regimen review has been completed. Pertinent information or medication issues identified include:    None        The following PTA medications were not continued during hospitalization at [facility]:   Ozempic 0.5mg once weekly on Monday      Please assess whether a future restart would be appropriate.       Thank you for the opportunity to participate in the care of this patient.      Arlyn Fontanez RPh,BCCCP, BCCP    10/24/2023 3:41 PM

## 2023-10-24 NOTE — SIGNIFICANT EVENT
Pt arrived on unit.   Presented to bedside.  Pt feels well presently.  Discussed continuing meds for tonight and making adjustments as needed tomorrow.  Pt reports no concerns at present.    Admission orders entered  Med rec completed  H&P to follow    Miguel Shannon MD

## 2023-10-24 NOTE — PROGRESS NOTES
Care Management Follow Up    Length of Stay (days): 25    Expected Discharge Date: 10/24/2023     Concerns to be Addressed: adjustment to diagnosis/illness, coping/stress, discharge planning     Patient plan of care discussed at interdisciplinary rounds: Yes    Anticipated Discharge Disposition: LTACH     Anticipated Discharge Services:    Anticipated Discharge DME:      Patient/family educated on Medicare website which has current facility and service quality ratings:    Education Provided on the Discharge Plan: Yes  Patient/Family in Agreement with the Plan: yes    Referrals Placed by CM/SW:    Private pay costs discussed: Not applicable    Additional Information:  Writer called [facility]missions liaision. Instructed to set up tentative ride time for today about 1 pm. Writer called [facility] and coordinated a S stretcher ride to [address] [place] [address]. Transport window time [id number]  Patient and RN updated. Await final call from [facility] if bed is ready today.     0911--call received from [facility], Bed is available today so will proceed with discharge. Message sent to MD. Stephanie Gomes, RN   [facility] [place]   Phone [phone] or [phone]

## 2023-10-24 NOTE — PHARMACY-ANTICOAGULATION SERVICE
Clinical Pharmacy - Warfarin Dosing Consult     Pharmacy has been consulted to manage this patient s warfarin therapy.  Dose Comments: Major: torsemide, aspirin    INR   Date Value Ref Range Status   10/24/2023 2.86 (H) 0.85 - 1.15 Final   10/23/2023 2.83 (H) 0.85 - 1.15 Final       Recommend warfarin 2mg today.  Pharmacy will monitor Linda Loredo daily and order warfarin doses to achieve specified goal.      Please contact pharmacy as soon as possible if the warfarin needs to be held for a procedure or if the warfarin goals change.    Arlyn Fontanez, [facility],BCCCP, BCCP

## 2023-10-25 ENCOUNTER — APPOINTMENT (OUTPATIENT)
Dept: PHYSICAL THERAPY | Facility: CLINIC | Age: 78
DRG: 560 | End: 2023-10-25
Attending: STUDENT IN AN ORGANIZED HEALTH CARE EDUCATION/TRAINING PROGRAM
Payer: COMMERCIAL

## 2023-10-25 ENCOUNTER — APPOINTMENT (OUTPATIENT)
Dept: OCCUPATIONAL THERAPY | Facility: CLINIC | Age: 78
DRG: 560 | End: 2023-10-25
Attending: STUDENT IN AN ORGANIZED HEALTH CARE EDUCATION/TRAINING PROGRAM
Payer: COMMERCIAL

## 2023-10-25 LAB
GLUCOSE BLDC GLUCOMTR-MCNC: 113 MG/DL (ref 70–99)
GLUCOSE BLDC GLUCOMTR-MCNC: 143 MG/DL (ref 70–99)
GLUCOSE BLDC GLUCOMTR-MCNC: 186 MG/DL (ref 70–99)
GLUCOSE BLDC GLUCOMTR-MCNC: 232 MG/DL (ref 70–99)
INR PPP: 2.8 (ref 0.85–1.15)

## 2023-10-25 PROCEDURE — 250N000013 HC RX MED GY IP 250 OP 250 PS 637: Performed by: STUDENT IN AN ORGANIZED HEALTH CARE EDUCATION/TRAINING PROGRAM

## 2023-10-25 PROCEDURE — 120N000017 HC R&B RESPIRATORY CARE

## 2023-10-25 PROCEDURE — 85610 PROTHROMBIN TIME: CPT | Performed by: STUDENT IN AN ORGANIZED HEALTH CARE EDUCATION/TRAINING PROGRAM

## 2023-10-25 PROCEDURE — 97165 OT EVAL LOW COMPLEX 30 MIN: CPT | Mod: GO | Performed by: OCCUPATIONAL THERAPIST

## 2023-10-25 PROCEDURE — 97161 PT EVAL LOW COMPLEX 20 MIN: CPT | Mod: GP | Performed by: PHYSICAL THERAPIST

## 2023-10-25 PROCEDURE — 97530 THERAPEUTIC ACTIVITIES: CPT | Mod: GP | Performed by: PHYSICAL THERAPIST

## 2023-10-25 PROCEDURE — 250N000013 HC RX MED GY IP 250 OP 250 PS 637: Performed by: INTERNAL MEDICINE

## 2023-10-25 PROCEDURE — 999N000197 HC STATISTIC WOC PT EDUCATION, 0-15 MIN

## 2023-10-25 PROCEDURE — 97110 THERAPEUTIC EXERCISES: CPT | Mod: GP | Performed by: PHYSICAL THERAPIST

## 2023-10-25 PROCEDURE — 99222 1ST HOSP IP/OBS MODERATE 55: CPT | Performed by: STUDENT IN AN ORGANIZED HEALTH CARE EDUCATION/TRAINING PROGRAM

## 2023-10-25 PROCEDURE — 97535 SELF CARE MNGMENT TRAINING: CPT | Mod: GO | Performed by: OCCUPATIONAL THERAPIST

## 2023-10-25 RX ORDER — BISACODYL 10 MG
10 SUPPOSITORY, RECTAL RECTAL DAILY PRN
Status: DISCONTINUED | OUTPATIENT
Start: 2023-10-25 | End: 2023-11-15 | Stop reason: HOSPADM

## 2023-10-25 RX ORDER — SENNOSIDES 8.6 MG
2 TABLET ORAL 2 TIMES DAILY
Status: DISCONTINUED | OUTPATIENT
Start: 2023-10-25 | End: 2023-10-26

## 2023-10-25 RX ORDER — WARFARIN SODIUM 2.5 MG/1
2.5 TABLET ORAL
Status: COMPLETED | OUTPATIENT
Start: 2023-10-25 | End: 2023-10-25

## 2023-10-25 RX ORDER — BISACODYL 10 MG
20 SUPPOSITORY, RECTAL RECTAL DAILY PRN
Status: DISCONTINUED | OUTPATIENT
Start: 2023-10-25 | End: 2023-10-25

## 2023-10-25 RX ORDER — SENNOSIDES 8.6 MG
8.6 TABLET ORAL 2 TIMES DAILY
Status: DISCONTINUED | OUTPATIENT
Start: 2023-10-25 | End: 2023-10-25

## 2023-10-25 RX ORDER — LIDOCAINE HYDROCHLORIDE 20 MG/ML
JELLY TOPICAL ONCE
Status: COMPLETED | OUTPATIENT
Start: 2023-10-25 | End: 2023-10-25

## 2023-10-25 RX ADMIN — METHADONE HYDROCHLORIDE 10 MG: 10 TABLET ORAL at 17:03

## 2023-10-25 RX ADMIN — SENNOSIDES 8.6 MG: 8.6 TABLET, FILM COATED ORAL at 08:46

## 2023-10-25 RX ADMIN — INSULIN ASPART 1 UNITS: 100 INJECTION, SOLUTION INTRAVENOUS; SUBCUTANEOUS at 12:21

## 2023-10-25 RX ADMIN — MICONAZOLE NITRATE: 20 POWDER TOPICAL at 21:04

## 2023-10-25 RX ADMIN — MULTIPLE VITAMINS W/ MINERALS TAB 1 TABLET: TAB at 08:26

## 2023-10-25 RX ADMIN — BISACODYL 20 MG: 10 SUPPOSITORY RECTAL at 12:47

## 2023-10-25 RX ADMIN — TORSEMIDE 15 MG: 10 TABLET ORAL at 08:30

## 2023-10-25 RX ADMIN — ATORVASTATIN CALCIUM 40 MG: 40 TABLET, FILM COATED ORAL at 21:00

## 2023-10-25 RX ADMIN — METHADONE HYDROCHLORIDE 5 MG: 5 TABLET ORAL at 08:30

## 2023-10-25 RX ADMIN — METHADONE HYDROCHLORIDE 5 MG: 5 TABLET ORAL at 21:01

## 2023-10-25 RX ADMIN — INSULIN DEGLUDEC INJECTION 12 UNITS: 100 INJECTION, SOLUTION SUBCUTANEOUS at 21:01

## 2023-10-25 RX ADMIN — BISACODYL 10 MG: 10 SUPPOSITORY RECTAL at 11:42

## 2023-10-25 RX ADMIN — ASPIRIN 81 MG: 81 TABLET, COATED ORAL at 08:26

## 2023-10-25 RX ADMIN — WARFARIN SODIUM 2.5 MG: 2.5 TABLET ORAL at 17:04

## 2023-10-25 RX ADMIN — SODIUM PHOSPHATE, DIBASIC AND SODIUM PHOSPHATE, MONOBASIC 1 ENEMA: 7; 19 ENEMA RECTAL at 14:48

## 2023-10-25 RX ADMIN — MICONAZOLE NITRATE: 20 POWDER TOPICAL at 08:26

## 2023-10-25 RX ADMIN — INSULIN ASPART 1 UNITS: 100 INJECTION, SOLUTION INTRAVENOUS; SUBCUTANEOUS at 16:37

## 2023-10-25 RX ADMIN — METHADONE HYDROCHLORIDE 5 MG: 5 TABLET ORAL at 00:55

## 2023-10-25 ASSESSMENT — ACTIVITIES OF DAILY LIVING (ADL)
ADLS_ACUITY_SCORE: 37
ADLS_ACUITY_SCORE: 37
ADLS_ACUITY_SCORE: 41
ADLS_ACUITY_SCORE: 37
ADLS_ACUITY_SCORE: 41
ADLS_ACUITY_SCORE: 43
ADLS_ACUITY_SCORE: 37
ADLS_ACUITY_SCORE: 37
ADLS_ACUITY_SCORE: 43
ADLS_ACUITY_SCORE: 43
ADLS_ACUITY_SCORE: 40
ADLS_ACUITY_SCORE: 37

## 2023-10-25 NOTE — H&P
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)  #S/p Tibial/Fibular Resection and Debridement of Stump (10/17)  #Chronic Venous Stasis  - completed course of meropenem/vancomycin 10/8 for osteomyelitis  - vascular surgery planning to close in the future, monthly clinic visits for wound checks with closure at unspecified time in the future  - dilaudid 4mg PO and 0.3mg IV prn pain control (PO 30min prior and IV 15min prior to dressing changes)  - WOC consulted and following  - PT/OT    #Acute on Chronic Pain Syndrome  - methadone 5mg TID and 10mg @ 1500  - Dilaudid IV/PO prn pain and with dressing changes  - previous Psych eval PTA, no associated depression    #Fecal Impaction  #Constipation  - manual disimpaction  - dulcolax 10mg x1, later 20mg x1 (10/25)  - fleet enema (10/25)  - senna 2Q BID  - MoM TID prn  - pt refuses miralax  - viscous lidocaine for anal pain  - will rotate enema/suppository until adequate BM achieved  - additional meds as needed    #IDDM  - pt on degludec 35u qHS, metformin 2000mg daily, ozempic PTA  - continue insulin degludec 12u qHS   - medium ISS    #Afib  #HTN  #HLD  - simvastatin 20mg daily  - warfarin, pharmacy to dose  - continue holding PTA lisinopril 5mg per pt wishes, can restart after discharge    #CKD3  - baseline Cr 1-1.2  - holding lisinopril    #FTT  - nutrition consult  - SW following    #BERLIN  - refusing CPAP            Diet: Consistent Carbohydrate Diet Moderate Consistent Carb (60 g CHO per Meal) Diet  Snacks/Supplements Adult: Glucerna; Between Meals    DVT Prophylaxis: Warfarin  Braga Catheter: Not present  Lines: PRESENT      PICC 10/09/23 Triple Lumen Right Basilic-Site Assessment: WDL      Cardiac Monitoring: None  Code Status: Full Code      Clinically Significant Risk Factors Present on  "Admission               # Drug Induced Coagulation Defect: home medication list includes an anticoagulant medication  # Drug Induced Platelet Defect: home medication list includes an antiplatelet medication   # Hypertension: Noted on problem list     # DMII: A1C = 7.0 % (Ref range: <5.7 %) within past 6 months    # Obesity: Estimated body mass index is 39.5 kg/m  as calculated from the following:    Height as of 9/29/23: 1.676 m (5' 6\").    Weight as of this encounter: 111 kg (244 lb 11.4 oz).              Disposition Plan      Expected Discharge Date: 10/30/2023    Discharge Delays: Complex Discharge  Destination: inpatient rehabilitation facility  Discharge Comments: Wound Care-complex. Pain management.          Miguel Shannon MD  Hospitalist Service  [facility]  Securely message with [employer] (more info)  Text page via [employer] Paging/Directory     ______________________________________________________________________    Chief Complaint   Osteomyelitis s/p L BKA      History of Present Illness   Linda Loredo is a 78y F PMHx IDDM, chronic venous stasis, chronic pain syndrome, afib, HTN, HLD, CKD3, BERLIN, and FTT who presents to [facility] for ongoing wound care and therapies. Pt initially presented 9/23 to [facility] after welfare check by police. She was found to have L leg swelling and edema and was diagnosed with polymicrobial osteomyelitis. She is s/p debridement by podiatry on 9/26. Bcx negative at the time. ALMA with poor flow and pt was transferred to [facility] for vascular surgery evaluation. Pt managed with meropenem and vancomycin at the time for osteo. 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 Vascular surgery was unable to close the stump at the time and elected for ongoing healing prior to surgical closure thus she was discharged to [facility] for ongoing wound care and therapy.       Past Medical History    Past Medical History:   Diagnosis Date    Depressive " disorder, not elsewhere classified     Herpes zoster without mention of complication     Hypercalcemia 2/24/2007    Mild intermittent asthma     Other urinary incontinence     Type II or unspecified type diabetes mellitus with renal manifestations, uncontrolled(250.42) (H)     Type II or unspecified type diabetes mellitus without mention of complication, not stated as uncontrolled     Unspecified essential hypertension        Past Surgical History   Past Surgical History:   Procedure Laterality Date    AMPUTATE LEG BELOW KNEE Left 10/7/2023    Procedure: LEFT GUILLOTINE BELOW KNEE AMPUTATION.;  Surgeon: Lan Otto MD;  Location: [facility] OR    AMPUTATE LEG BELOW KNEE Left 10/14/2023    Procedure: debridement of left below the knee amputation;  Surgeon: Lan Otto MD;  Location: [facility] OR    CHOLECYSTECTOMY      INCISION AND DRAINAGE FOOT, COMBINED Left 9/26/2023    Procedure: Surgical debridement of all nonviable skin and soft tissue and bone left foot;  Surgeon: Nolberto Thorne DPM;  Location: [facility] OR    IR LOWER EXTREMITY ANGIOGRAM LEFT  10/3/2023    ligation of fallopian tube      OPEN REDUCTION INTERNAL FIXATION ANKLE  10/13/11    left    pinning of left 2nd toe         Prior to Admission Medications   Prior to Admission Medications   Prescriptions Last Dose Informant Patient Reported? Taking?   HYDROmorphone (DILAUDID) 4 MG tablet   No No   Sig: Take 1 tablet (4 mg) by mouth every 4 hours as needed for severe pain   ONE TOUCH TEST STRIPS TEST   VI  Self No No   Sig: use as directed   Patient taking differently: 1 strip by In Vitro route daily ACHS   OZEMPIC, 0.25 OR 0.5 MG/DOSE, 2 MG/3ML pen  Self Yes No   Sig: Inject 0.5 mg Subcutaneous once a week On Monday evenings   acetaminophen (TYLENOL) 325 MG tablet  Self No No   Sig: Take 2 tablets (650 mg) by mouth every 6 hours as needed for mild pain or other (and adjunct with moderate or severe pain or per patient request)   aspirin 81 MG EC  tablet   No No   Sig: Take 1 tablet (81 mg) by mouth daily   atorvastatin (LIPITOR) 40 MG tablet   No No   Sig: Take 1 tablet (40 mg) by mouth every evening   insulin degludec (TRESIBA) 200 UNIT/ML pen   No No   Sig: Inject 12 Units Subcutaneous at bedtime Around 11-11:30 pm   metFORMIN (GLUCOPHAGE XR) 500 MG 24 hr tablet  Self Yes No   Sig: Take 2,000 mg by mouth daily (with dinner)   methadone (DOLOPHINE) 10 MG tablet  Self No No   Sig: Take 1 tablet (10 mg) by mouth daily At 1500.  Take in addition to the 5 mg three times daily dose   methadone (DOLOPHINE) 5 MG tablet  Self No No   Sig: Take 1 tablet (5 mg) by mouth 3 times daily At 0800, 1200 and 2100.  Take in addition to the 10 mg tablet at 1500.   miconazole (MICATIN) 2 % external powder   No No   Sig: Apply topically 2 times daily   multivitamin w/minerals (THERA-VIT-M) tablet   No No   Sig: Take 1 tablet by mouth daily   naloxone (NARCAN) 4 MG/0.1ML nasal spray  Self No No   Sig: Spray 1 spray (4 mg) into one nostril alternating nostrils as needed for opioid reversal every 2-3 minutes until assistance arrives   senna-docusate (SENOKOT-S/PERICOLACE) 8.6-50 MG tablet   No No   Sig: Take 2 tablets by mouth 2 times daily   torsemide (DEMADEX) 5 MG tablet   No No   Sig: Take 3 tablets (15 mg) by mouth daily   vitamin C (ASCORBIC ACID) 500 MG tablet   Yes No   Sig: Take 500 mg by mouth daily   warfarin ANTICOAGULANT (COUMADIN) 2.5 MG tablet  Self Yes No   Si - 3.75 mg  22 - 3.75 mg  23 - 2.5 mg  24 - 3.75 mg  25 - 2.5 mg  26 - 3.75 mg  27 - 2.5 mg  28 - 3.75 mg      Facility-Administered Medications: None        Review of Systems    The 10 point Review of Systems is negative other than noted in the HPI.    Social History   I have reviewed this patient's social history and updated it with pertinent information if needed.  Social History     Tobacco Use    Smoking status: Never    Smokeless tobacco: Never   Substance Use Topics    Alcohol use: No    Drug use:  No         Family History   I have reviewed this patient's family history and updated it with pertinent information if needed.  Family History   Problem Relation Age of Onset    Hypertension Mother     Heart Disease Father         heart attack and cardiomegaly    Diabetes Sister         type 2    Hypertension Sister     Respiratory Sister     Psychotic Disorder Sister         bipolar    Cancer Maternal Grandmother         throat    Cancer Paternal Grandmother         gastric         Allergies   Allergies   Allergen Reactions    Prednisone Nausea and Vomiting    Morphine Nausea and Vomiting    Morphine [Fumaric Acid] Nausea and Vomiting        Physical Exam   Vital Signs: Temp: 98.3  F (36.8  C) Temp src: Oral BP: 124/82 Pulse: 85   Resp: 20 SpO2: 100 % O2 Device: None (Room air)    Weight: 244 lbs 11.37 oz  Gen: awake, alert, normal appearing, comfortable  HEET: EOMI, MMM  CV: RRR, nl s1s2, no m/r/g  Pulm: CTAB, good respiratory effort, no w/r/r  GI: soft, NT/ND  MSK: L BKA with dressing in place  Integ: see WOC note for full assessment  Psych: normal mood, normal affect      Medical Decision Making       65 MINUTES SPENT BY ME on the date of service doing chart review, history, exam, documentation & further activities per the note.      Data     I have personally reviewed the following data over the past 24 hrs:    INR:  2.80 (H) PTT:  N/A   D-dimer:  N/A Fibrinogen:  N/A       Imaging results reviewed over the past 24 hrs:   No results found for this or any previous visit (from the past 24 hour(s)).

## 2023-10-25 NOTE — CONSULTS
"CLINICAL NUTRITION SERVICES  -  ASSESSMENT NOTE      Recommendations Ordered by Registered Dietitian (RD):   Continue Glucerna daily  MVI/M - continue  Ordered Gelatein Plus daily - patient previously receiving   Future/Additional Recommendations:   Continue to encourage high protein foods d/t increased needs for wound healing, adjust per patient's preferences   Malnutrition:   % Weight Loss:  Weight loss does not meet criteria for malnutrition - d/t BKA  % Intake:  No decreased intake noted  Subcutaneous Fat Loss:  unable to assess  Muscle Loss:  unable to assess  Fluid Retention:  unable to assess    Malnutrition Diagnosis: Unable to determine due to lack of NFPE     REASON FOR ASSESSMENT  Linda ELIE Loredo is a 78 year old female seen by Registered Dietitian for Provider Order - Nutrition assessment    PMH:  - insulin-dependent T2DM, dyslipidemia, CKD 3, failure to thrive, BERLIN  - Patient admitted to [facility] 9/29/23. She was transferred from [facility] for L heel osteomyelitis.  She underwent BKA on 10/7/23 and further debridement on 10/14/23.   - Admitted to [facility] 10/24 for further cares    NUTRITION HISTORY  - Information obtained from chart  - per 9/25 RD note \"Patient reports that her appetite is sporadic. She notes that she typically will eat at least two protein focused meals per day. Patient further notes that she has also started to implement protein shakes at home. Patient denied any chewing or swallowing difficulties. Linda noted no chronic GI concerns at this time. RD went over the importance of protein for wound healing and immunity. Patient was agreeable to a trial of glucerna with a preference for strawberry flavors.\"  - Last seen by RD on 10/19/23, patient does not like Dave or Expedite per previous RD notes. Was eating well % of meals TID.    CURRENT NUTRITION ORDERS  Diet Order:     Orders Placed This Encounter      Consistent Carbohydrate Diet Moderate " "Consistent Carb (60 g CHO per Meal) Diet    Current Intake/Tolerance:  Ate 100% dinner last night. Historically eating well at previous hospital, receiving oral nutrition supplements for additional protein.    NUTRITION FOCUSED PHYSICAL ASSESSMENT FOR DIAGNOSING MALNUTRITION)  No: Unable to assess, RD off-site    ANTHROPOMETRICS  Height: 5'6\"  Weight: 244 lbs 11.37 oz  Body mass index is 39.5 kg/m .  Weight Status:  Obesity Grade II BMI 35-39.9  Weight History:   Weight loss noted 9/26-10/24 - notably her BKA was within this time period  Wt Readings from Last 10 Encounters:   10/24/23 111 kg (244 lb 11.4 oz)   10/24/23 111.2 kg (245 lb 2.4 oz)   09/26/23 120.2 kg (265 lb)   06/22/23 111.4 kg (245 lb 8 oz)   06/19/23 111.4 kg (245 lb 8 oz)   06/16/23 111.4 kg (245 lb 8 oz)   06/13/23 111.5 kg (245 lb 12.8 oz)   06/12/23 121.6 kg (268 lb)   06/08/23 121.6 kg (268 lb)   06/05/23 121.6 kg (268 lb)     LABS  Labs reviewed  Labs:  Electrolytes  Potassium (mmol/L)   Date Value   10/21/2023 4.2   10/20/2023 3.6   10/19/2023 3.8   06/28/2022 4.3   02/22/2008 4.5   10/15/2007 4.1   09/18/2007 4.0     Phosphorus (mg/dL)   Date Value   10/21/2023 3.2   10/17/2023 2.6   10/10/2023 2.9   06/20/2005 3.3    Blood Glucose  Glucose (mg/dL)   Date Value   06/28/2022 73   02/22/2008 298 (H)   10/15/2007 232 (H)   09/18/2007 199 (H)   09/05/2007 102 (H)   06/08/2007 245 (H)     GLUCOSE BY METER POCT (mg/dL)   Date Value   10/25/2023 113 (H)   10/24/2023 146 (H)   10/24/2023 148 (H)   10/24/2023 95   10/24/2023 122 (H)     Hemoglobin A1C (%)   Date Value   09/23/2023 7.0 (H)   05/27/2023 8.2 (H)   12/19/2007 10.0 (H)   09/05/2007 8.8 (A)   06/08/2007 10.1 (H)   12/22/2006 8.6 (H)   09/18/2006 7.3 (H)    Inflammatory Markers  CRP Inflammation (mg/L)   Date Value   03/28/2008 16.0 (H)     WBC (10e9/L)   Date Value   02/22/2008 9.4   09/18/2007 9.3   02/24/2006 8.5     WBC Count (10e3/uL)   Date Value   10/21/2023 7.3   10/17/2023 7.3 "   10/16/2023 8.5     Albumin (g/dL)   Date Value   09/22/2023 3.1 (L)   05/29/2023 2.8 (L)   05/28/2023 2.4 (L)   02/22/2008 4.1   10/15/2007 4.4   06/08/2007 4.3      Magnesium (mg/dL)   Date Value   10/21/2023 1.9   10/17/2023 1.7   10/10/2023 2.0   06/20/2005 1.4 (L)     Sodium (mmol/L)   Date Value   10/21/2023 142   10/20/2023 141   10/19/2023 140   02/22/2008 140   10/15/2007 141   09/18/2007 142    Renal  Urea Nitrogen (mg/dL)   Date Value   10/21/2023 20.3   10/20/2023 19.0   10/19/2023 18.9   06/28/2022 42 (H)   02/22/2008 15   10/15/2007 17   09/18/2007 14     Creatinine (mg/dL)   Date Value   10/21/2023 1.02 (H)   10/20/2023 1.04 (H)   10/19/2023 0.98 (H)   02/22/2008 0.82   10/15/2007 0.87   09/18/2007 0.75     Additional  Triglycerides (mg/dL)   Date Value   09/30/2023 49   09/30/2023 58   06/08/2007 70   12/22/2006 78   09/18/2006 93     Ketones Urine (mg/dL)   Date Value   09/23/2023 Negative   09/18/2006 Trace (A)        MEDICATIONS  Medications reviewed   aspirin  81 mg Oral Daily    atorvastatin  40 mg Oral QPM    influenza vac high-dose quad  0.7 mL Intramuscular Prior to discharge    insulin aspart  1-7 Units Subcutaneous TID AC    insulin aspart  1-5 Units Subcutaneous At Bedtime    insulin degludec  12 Units Subcutaneous At Bedtime    methadone  10 mg Oral Daily    methadone  5 mg Oral TID    miconazole   Topical BID    multivitamin w/minerals  1 tablet Oral Daily    sennosides  8.6 mg Oral BID    sodium chloride (PF)  10-40 mL Intracatheter Q8H    torsemide  15 mg Oral Daily    Warfarin Therapy Reminder  1 each Oral See Admin Instructions       - MEDICATION INSTRUCTIONS -        acetaminophen, glucose **OR** dextrose **OR** glucagon, HYDROmorphone, HYDROmorphone **OR** HYDROmorphone, melatonin, naloxone **OR** naloxone **OR** naloxone **OR** naloxone, ondansetron, - MEDICATION INSTRUCTIONS -, sodium chloride (PF)     ASSESSED NUTRITION NEEDS PER APPROVED PRACTICE GUIDELINES:  Dosing Weight 111  kg (current weight), IBW 59 kg for protein needs  Estimated Energy Needs: 0378-6486+ kcals (14-17+ Kcal/Kg)  Justification: obesity guidelines, would aim for high-end estimated needs or above d/t increased needs for wound healing  Estimated Protein Needs: 118-148 grams protein (2-2.5 g pro/Kg IBW)  Justification: wound healing and obesity guidelines   Estimated Fluid Needs: 1 mL/Kcal or per provider pending fluid status    NUTRITION DIAGNOSIS:  Increased nutrient needs (protein) related to wound healing as evidenced by recent BKA, osteomyelitis.    NUTRITION INTERVENTIONS  Recommendations / Nutrition Prescription  See top of note.    Implementation  Nutrition education: Per Provider order if indicated   Medical Food Supplement and Multivitamin/Mineral    Nutrition Goals  Patient to consume >75% of TID meals + 2 supplements per day   BG WNL  Wound healing    MONITORING AND EVALUATION:  Progress towards goals will be monitored and evaluated per protocol and Practice Guidelines      LIV Murphy RDN  [facility]  Office: [phone] david Leon

## 2023-10-25 NOTE — CONSULTS
"Care Management Initial Consult    General Information  Assessment completed with: Patient, patient  Type of CM/SW Visit: Initial Assessment    Primary Care Provider verified and updated as needed: Yes   Readmission within the last 30 days:        Reason for Consult: care coordination/care conference, discharge planning  Advance Care Planning: Advance Care Planning Reviewed: present on chart          Communication Assessment  Patient's communication style: spoken language (English or Bilingual)    Hearing Difficulty or Deaf: no   Wear Glasses or Blind: yes    Cognitive  Cognitive/Neuro/Behavioral: WDL                      Living Environment:   People in home: alone     Current living Arrangements: apartment ([facility] [address])      Able to return to prior arrangements: other (see comments).  When asked about discharge goals/discharge planning, patient stated, \" That is no your business, I do not want your help, you dont need to know my business.\"   Patient reports living alone in senior independent apartment in [place], [place].  She reports eamon no longer has wheelchair at home, \" I threw it out.\"  Patient reports she can get some prepared meals in her apartment bldg.   Patient reports having [facility] for skilled RN up until her hospital admit.  She reports daughter assists with shopping, transport, meals, cooking.       Family/Social Support:  Care provided by: self, child(angy) (daughter assists with meals, shopping, cooking, driving) Lu-Daughter    Provides care for: no one, unable/limited ability to care for self  Marital Status:   Children            Description of Support System: Involved, Supportive    Support Assessment: Adequate family and caregiver support, Adequate social supports    Current Resources:   Patient receiving home care services: Yes ([facility] for skilled RN)  Skilled Home Care Services: Skilled Nursing  Community Resources: Other (see comment) " "(Patient reports going to a Pain Clinic)  Equipment currently used at home: grab bar, toilet.  When asked about using a wheelchair at home, patient said, \" I dont have the wheelchair anymore, I threw it out.\"    Supplies currently used at home: Diabetic Supplies    Employment/Financial:  Employment Status: retired        Financial Concerns:     Referral to Financial Worker: No       Does the patient's insurance plan have a 3 day qualifying hospital stay waiver?  No    Lifestyle & Psychosocial Needs:  Social Determinants of Health     Food Insecurity: Not on file   Depression: Not on file   Housing Stability: Not on file   Tobacco Use: Low Risk  (10/16/2023)    Patient History     Smoking Tobacco Use: Never     Smokeless Tobacco Use: Never     Passive Exposure: Not on file   Financial Resource Strain: Not on file   Alcohol Use: Not on file   Transportation Needs: Not on file   Physical Activity: Not on file   Interpersonal Safety: Not on file   Stress: Not on file   Social Connections: Not on file       Functional Status:  Prior to admission patient needed assistance: Patient lived alone.  Assist from daughter and skilled homecare             Mental Health Status:  Mental Health Status: No Current Concerns       Chemical Dependency Status:  Chemical Dependency Status: No Current Concerns             Values/Beliefs:  Spiritual, Cultural Beliefs, Voodoo Practices, Values that affect care: no               Additional Information:      Writer completed What To Expect meeting with patient/family.  LTACH staffing and services were explained, and the ELOS/Target stay of days.    WTE meeting/assessment took place on 10/25/23 @ 07:30.  Care progression meeting scheduled for TBD  Patient's exact discharge date, time, disposition TBD  SW will continue to follow for psychosocial and emotional support of patient and family. SW to facilitate discharge to Home vs TCU when pt no longer requires LTACH level of care. However, When " "asked about discharge goals/discharge planning, patient stated, \" That is no your business, I do not want your help, you dont need to know my business.\"           Eleuterio Torres, MaineGeneral Medical CenterSW      "

## 2023-10-25 NOTE — CONSULTS
[facility] RN Consult Note    Today's Visit: Unable to see patient today. Patient has a detailed procedure for wound care and does not wish to do wound care today until some other concerns are taken care of. [facility] nurse reviewed most recent note from [facility] nurse at previous facility, as well as current orders with RN. Primary nursing will complete wound care today and [facility] nurse will follow up tomorrow 10/26.  ZAMZAM Sethi, RN, PHN, HNB-BC, CWOCN  Pager no longer is use, please contact through [employer] group: [facility] Vocera Group

## 2023-10-25 NOTE — PROGRESS NOTES
10/25/23 1500   Name of Certified Therapeutic Rec Specialist   Name of Certified Therapeutic Rec Specialist YANIRA Snow   Appointment Type   Type of Therapeutic Rec Session Therapeutic Rec Assessment   General Information   Patient Profile Review See Profile for full history and prior level of function   Daily Contact with Relatives or Friends Phone call;Visit   Community Involvement   Community Involvement Retired;Disabled   Outings Shopping   Hobbies/Interests   Word Puzzles Word Search;Crossword;Other (see comments)  (has some from home)   Music   Music Preferences Country;Other (comments)  (oldies from 50's and 60's)   Listens to Recorded Music Yes   Brought Own Equipment Yes   Media   Computer Has own at home;Will use tablet/phone   TV / Movies TV   Reading Books;Has own reading materials   Reading Preferences Mystery   Impression   Open to Socializing with Others Initiates with cues   Barriers to Leisure Endurance;Mobility;Sitting tolerance   Patient, family and / or staff in agreement with Plan of Care Yes   Treatment Plan   Type of Intervention Independent with activity   Equipment and Supplies While on Unit None needed   Assessment Assessment completed, pt was oriented to role of rec therapy and provided with leisure resource list. Pt has no leisure needs at this time, having brought many leisure materials from home. also stated daughter will bring what she needs. Will monitor pt for materials as needed.

## 2023-10-25 NOTE — PLAN OF CARE
Goal Outcome Evaluation:        Patient was calm and cooperative with cares, denied pain and  slept most of the shift, around 0645 patient asked for pain medication, writer asked her if she want prn Dilaudid  tablet or IV , patient stated she wanted methadone, writer told her that methadone was scheduled at 0800, she started asking question about her medication, what time each one was scheduled, writer explained to her. She said writer should hold on the pain medication. She stated she will like to speak with the MD for clarification concerning her medication, Am nurse to follow up.

## 2023-10-25 NOTE — PLAN OF CARE
Problem: Adult Inpatient Plan of Care  Goal: Optimal Comfort and Wellbeing  Outcome: Progressing     Problem: Wound  Goal: Optimal Coping  Outcome: Progressing  Goal: Optimal Pain Control and Function  Outcome: Progressing  Goal: Skin Health and Integrity  Outcome: Progressing  Intervention: Optimize Skin Protection  Recent Flowsheet Documentation  Taken 10/24/2023 1631 by Sarina Lane, RN  Activity Management: activity adjusted per tolerance  Head of Bed (HOB) Positioning: HOB at 20-30 degrees   Goal Outcome Evaluation:       Pt alert/oriented pleasant coop with cares denies pain, right lower leg reddened,sreekanth left buttock crease red, redness under breasts, ate 100% meal, fluids enc last BM 10/20 senna given HS, positive bowel sounds passing flatus, left BKA dressing intact elevated on pillow

## 2023-10-26 ENCOUNTER — APPOINTMENT (OUTPATIENT)
Dept: PHYSICAL THERAPY | Facility: CLINIC | Age: 78
DRG: 560 | End: 2023-10-26
Attending: INTERNAL MEDICINE
Payer: COMMERCIAL

## 2023-10-26 ENCOUNTER — APPOINTMENT (OUTPATIENT)
Dept: OCCUPATIONAL THERAPY | Facility: CLINIC | Age: 78
DRG: 560 | End: 2023-10-26
Attending: INTERNAL MEDICINE
Payer: COMMERCIAL

## 2023-10-26 LAB
GLUCOSE BLDC GLUCOMTR-MCNC: 106 MG/DL (ref 70–99)
GLUCOSE BLDC GLUCOMTR-MCNC: 134 MG/DL (ref 70–99)
GLUCOSE BLDC GLUCOMTR-MCNC: 147 MG/DL (ref 70–99)
GLUCOSE BLDC GLUCOMTR-MCNC: 174 MG/DL (ref 70–99)
GLUCOSE BLDC GLUCOMTR-MCNC: 175 MG/DL (ref 70–99)
INR PPP: 3.59 (ref 0.85–1.15)

## 2023-10-26 PROCEDURE — 250N000013 HC RX MED GY IP 250 OP 250 PS 637: Performed by: INTERNAL MEDICINE

## 2023-10-26 PROCEDURE — 120N000017 HC R&B RESPIRATORY CARE

## 2023-10-26 PROCEDURE — 97110 THERAPEUTIC EXERCISES: CPT | Mod: GP

## 2023-10-26 PROCEDURE — 97530 THERAPEUTIC ACTIVITIES: CPT | Mod: GP

## 2023-10-26 PROCEDURE — 250N000013 HC RX MED GY IP 250 OP 250 PS 637: Performed by: STUDENT IN AN ORGANIZED HEALTH CARE EDUCATION/TRAINING PROGRAM

## 2023-10-26 PROCEDURE — G0463 HOSPITAL OUTPT CLINIC VISIT: HCPCS

## 2023-10-26 PROCEDURE — 99233 SBSQ HOSP IP/OBS HIGH 50: CPT | Performed by: STUDENT IN AN ORGANIZED HEALTH CARE EDUCATION/TRAINING PROGRAM

## 2023-10-26 PROCEDURE — 85610 PROTHROMBIN TIME: CPT | Performed by: STUDENT IN AN ORGANIZED HEALTH CARE EDUCATION/TRAINING PROGRAM

## 2023-10-26 PROCEDURE — 97110 THERAPEUTIC EXERCISES: CPT | Mod: GO | Performed by: OCCUPATIONAL THERAPIST

## 2023-10-26 PROCEDURE — 250N000011 HC RX IP 250 OP 636: Performed by: STUDENT IN AN ORGANIZED HEALTH CARE EDUCATION/TRAINING PROGRAM

## 2023-10-26 RX ORDER — AMOXICILLIN 250 MG
2 CAPSULE ORAL 2 TIMES DAILY
Status: DISCONTINUED | OUTPATIENT
Start: 2023-10-26 | End: 2023-10-28

## 2023-10-26 RX ORDER — POLYETHYLENE GLYCOL 3350 17 G/17G
17 POWDER, FOR SOLUTION ORAL 2 TIMES DAILY
Status: DISCONTINUED | OUTPATIENT
Start: 2023-10-26 | End: 2023-11-15 | Stop reason: HOSPADM

## 2023-10-26 RX ADMIN — HYDROMORPHONE HYDROCHLORIDE 4 MG: 4 TABLET ORAL at 10:26

## 2023-10-26 RX ADMIN — TORSEMIDE 15 MG: 10 TABLET ORAL at 08:25

## 2023-10-26 RX ADMIN — MULTIPLE VITAMINS W/ MINERALS TAB 1 TABLET: TAB at 08:25

## 2023-10-26 RX ADMIN — METHADONE HYDROCHLORIDE 5 MG: 5 TABLET ORAL at 12:18

## 2023-10-26 RX ADMIN — HYDROMORPHONE HYDROCHLORIDE 0.3 MG: 1 INJECTION, SOLUTION INTRAMUSCULAR; INTRAVENOUS; SUBCUTANEOUS at 11:05

## 2023-10-26 RX ADMIN — METHADONE HYDROCHLORIDE 5 MG: 5 TABLET ORAL at 08:25

## 2023-10-26 RX ADMIN — METHADONE HYDROCHLORIDE 5 MG: 5 TABLET ORAL at 22:14

## 2023-10-26 RX ADMIN — ASPIRIN 81 MG: 81 TABLET, COATED ORAL at 08:25

## 2023-10-26 RX ADMIN — SENNOSIDES AND DOCUSATE SODIUM 2 TABLET: 50; 8.6 TABLET ORAL at 22:04

## 2023-10-26 RX ADMIN — METHADONE HYDROCHLORIDE 10 MG: 10 TABLET ORAL at 14:33

## 2023-10-26 RX ADMIN — ATORVASTATIN CALCIUM 40 MG: 40 TABLET, FILM COATED ORAL at 22:05

## 2023-10-26 RX ADMIN — MICONAZOLE NITRATE: 20 POWDER TOPICAL at 08:29

## 2023-10-26 RX ADMIN — INSULIN DEGLUDEC INJECTION 12 UNITS: 100 INJECTION, SOLUTION SUBCUTANEOUS at 22:01

## 2023-10-26 RX ADMIN — WARFARIN SODIUM 1.5 MG: 3 TABLET ORAL at 17:18

## 2023-10-26 RX ADMIN — MICONAZOLE NITRATE: 20 POWDER TOPICAL at 22:05

## 2023-10-26 RX ADMIN — INSULIN ASPART 1 UNITS: 100 INJECTION, SOLUTION INTRAVENOUS; SUBCUTANEOUS at 17:18

## 2023-10-26 ASSESSMENT — ACTIVITIES OF DAILY LIVING (ADL)
ADLS_ACUITY_SCORE: 41
ADLS_ACUITY_SCORE: 39
ADLS_ACUITY_SCORE: 41
ADLS_ACUITY_SCORE: 39
ADLS_ACUITY_SCORE: 41
ADLS_ACUITY_SCORE: 39
ADLS_ACUITY_SCORE: 41

## 2023-10-26 NOTE — DISCHARGE INSTRUCTIONS
WOC DISCHARGE INSTRUCTIONS:  Left BKA:  1. Cleanse with Vashe soaked gauze, including sreekanth wound skin, gently pat dry  2. Apply skin prep to sreekanth wound skin  3. Soak Hydrofera Blue with saline and place over wound bed, cover with abd, and wrap with kerlix to secure  Change daily    Left buttock fold:  1. Cleanse with bath wipes, including sreekanth wound skin, gently pat dry  2. Apply Mepilex  Change every other day    Daily:  Interdry(order#[id number]):   1.  Wash skin gently. Pat, do not rub.  2.  With clean scissors, cut enough fabric to cover the affected area, allowing for a minimum of 2 inches to extend beyond the skin fold for moisture evaporation.  3.  Lay a single layer of fabric in the skin fold, placing one edge into the base of the fold. Gently smooth the rest of the fabric over the skin, keeping it flat.  4.  Leave at least 2 inches of fabric exposed outside the skin fold.  5.  Secure the fabric in one of several ways: with the skin fold, with a small amount of skin-friendly tape, or tucked under clothing.  6.  Remove the fabric before bathing and reuse it when finished. When removing, gently separate the skin fold and lift away.  Helpful Hints  1. InterDry  can be written on with a ballpoint pen. It may be helpful to label each piece of InterDry  with the date you started using it.  2.  Each piece of InterDry  may be used up to 5 days, depending on fabric soiling, odor, amount of moisture and general skin condition. Replace InterDry  if it becomes soiled with blood, urine or stool.  3.  Do not use creams, ointments, or powders with InterDry  as it may interfere with product efficacy.

## 2023-10-26 NOTE — PLAN OF CARE
Problem: Wound  Goal: Optimal Wound Healing  Outcome: Progressing     Problem: Pain Acute  Goal: Optimal Pain Control and Function  Outcome: Progressing     Pt was seen by the WOC RN this shift.  Wound to left BKA was assessed and dressing changed.  PRN dilaudid PO and IV were given prior to dressing change.  Pt reports pain to left BKA is always at least 6/10 and that the use of pain medication is to keep it from going above 6/10.

## 2023-10-26 NOTE — PLAN OF CARE
Problem: Anxiety Signs/Symptoms  Goal: Optimized Energy Level (Anxiety Signs/Symptoms)  Outcome: Not Progressing  Goal: Optimized Cognitive Function (Anxiety Signs/Symptoms)  Outcome: Not Progressing   Goal Outcome Evaluation:         Pt is AOX4, pleasant and polite, in bed with ESTEBAN CRUZ.  She is requesting for MD to clarify her wound dressing pain med order and her Methadone frequency schedule.  MD visited with patient and answered her med questions.      She visited with PT today.    She was on the commode in the morning and had hard impacted stool and having a hard time passing her BM.  She was only able to pass 3 chunks of hard and formed stool.  She stated that she feels like going but could not.  Orders obtained for Bisacodyl supp, manual digital disimpaction and enema.  Bisacodyl supp given X1 and not effective.       She refused to take her scheduled Methadone and refused to see WOC RN until her BM issue is resolved.  Manual digital disimpaction attempted but it was painful for her.   Bisacodyl supp given again X2 and still ineffective.  Fleet enema given     She was still trying to move her bowels at the end of the shift.    Mariza Miller, RN

## 2023-10-26 NOTE — CONSULTS
[facility]  WO Nurse Inpatient Assessment     Consulted for: Left BKA    Patient History (according to provider note(s):      Per H+P:   78y F PMHx IDDM, chronic venous stasis, chronic pain syndrome, afib, HTN, HLD, CKD3, BERLIN, and FTT who presents to [facility] for ongoing wound care and therapies. Pt initially presented 9/23 to [facility] after welfare check by police. She was found to have L leg swelling and edema and was diagnosed with polymicrobial osteomyelitis. She is s/p debridement by podiatry on 9/26. Bcx negative at the time. ALMA with poor flow and pt was transferred to [facility] for vascular surgery evaluation. Pt managed with meropenem and vancomycin at the time for osteo. 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 Vascular surgery was unable to close the stump at the time and elected for ongoing healing prior to surgical closure thus she was discharged to [facility] for ongoing wound care and therapy.      Assessment:      Areas visualized during today's visit: Complete head to toe     Wound location: Left buttock fold and skin folds  Wound due to: Friction and Incontinence Associated Dermatitis (IAD)  Wound history/plan of care: Patient with history of wounds and IAD  Wound base: Left buttock fold dermis - buttocks and perineal Scattered erythema but no rash or other open areas at this time  Skin folds - bilateral breasts, pannus and groin, posterior knee and thigh folds denudement - Interdry in place but not long enough for wicking. Reviewed proper use with patient and nurse, will order new Interdry     Palpation of the wound bed: normal      Drainage: scant      Description of drainage: serosanguinous     Measurements (length x width x depth, in cm): 3 x 4 x 0.2cm     Tunneling: N/A     Undermining: N/A  Periwound skin: Intact      Color: pink      Temperature: normal   Odor: none  Pain: denies   Treatment goal: Heal  and  Protection  STATUS: initial assessment   Supplies ordered: supplies stored on unit       Wound location: Left BKA   Last photo: 10/26     Previous facility      10/26    Wound due to: surgical  Wound history/plan of care: s/p saritha CRUZ 10/7  Wound base:  Granulation and adipose, mid wound dusky/discolored, may be due to recent bleeding, will continue to observe  ~Left lateral thigh wounds are healed, new pink skin     Palpation of the wound bed: soft, boggy     Drainage: moderate     Description of drainage: serosanguinous     Measurements (length x width x depth, in cm): 17  x 17cm, raised in center but edges flush     Tunneling: N/A     Undermining: N/A  Periwound skin: Edematous and intact      Color: red/pink        Temperature: normal   Odor: moderate  Pain: mild to moderate during dressing change  Pain interventions prior to dressing change: oral dilaudid, IV dilaudid, slow and gentle cares   Treatment goal: Drainage control, Infection control/prevention, Increase granulation, Protection, and Prepare wound bed for flap/graft  STATUS: initial assessment  Supplies ordered: stored on unit      Wound location: Plantar right foot    Last photo: 10/26  Wound due to:  Diabetic versus pressure  Wound history/plan of care: Patient states she has had this a long time   Wound base: discoloration under callus     Palpation of the wound bed: normal      Drainage: none     Description of drainage: none     Measurements (length x width x depth, in cm): 4  x 4.5 cm      Tunneling: N/A     Undermining: N/A  Periwound skin: Intact      Color: normal and consistent with surrounding tissue      Temperature: normal   Odor: none  Pain: denies   Treatment goal: Maintain (prevention of deterioration)  STATUS: initial assessment  Patient also has dry patch/callus/eschar on left palm of hand - will monitor      Treatment Plan:     Left BKA:  1. Cleanse with Vashe soaked gauze, including sreekanth wound skin, gently pat dry  2. Apply  skin prep to sreekanth wound skin  3. Soak Hydrofera Blue with saline and place over wound bed, cover with abd, and wrap with kerlix to secure  Change daily    Left buttock fold:  1. Cleanse with bath wipes, including sreekanth wound skin, gently pat dry  2. Apply Mepilex  Change every other day    Daily:  Interdry(order#[id number]):   1.  Wash skin gently. Pat, do not rub.  2.  With clean scissors, cut enough fabric to cover the affected area, allowing for a minimum of 2 inches to extend beyond the skin fold for moisture evaporation.  3.  Lay a single layer of fabric in the skin fold, placing one edge into the base of the fold. Gently smooth the rest of the fabric over the skin, keeping it flat.  4.  Leave at least 2 inches of fabric exposed outside the skin fold.  5.  Secure the fabric in one of several ways: with the skin fold, with a small amount of skin-friendly tape, or tucked under clothing.  6.  Remove the fabric before bathing and reuse it when finished. When removing, gently separate the skin fold and lift away.  Helpful Hints  1. InterDry  can be written on with a ballpoint pen. It may be helpful to label each piece of InterDry  with the date you started using it.  2.  Each piece of InterDry  may be used up to 5 days, depending on fabric soiling, odor, amount of moisture and general skin condition. Replace InterDry  if it becomes soiled with blood, urine or stool.  3.  Do not use creams, ointments, or powders with InterDry  as it may interfere with product efficacy.      Orders: Written    RECOMMEND PRIMARY TEAM ORDER: None, at this time  Education provided: plan of care, wound progress, Moisture management, Hygiene, and Off-loading pressure  Discussed plan of care with: Patient and Nurse   WOC nurse follow-up plan: weekly   Notify WOC if wound(s) deteriorate.  Nursing to notify the Provider(s) and re-consult the WOC Nurse if new skin concern.    DATA:     Current support surface: Standard  Low air loss (BRISA pump,  Isolibrium, Pulsate, skin guard, etc)  Containment of urine/stool: Incontinence Protocol, Incontinent pad in bed, and Purewick external catheter   BMI: Body mass index is 40.84 kg/m .   Active diet order: Orders Placed This Encounter      Consistent Carbohydrate Diet Moderate Consistent Carb (60 g CHO per Meal) Diet     Output: I/O last 3 completed shifts:  In: 720 [P.O.:720]  Out: 200 [Urine:200]     Labs:   Recent Labs   Lab 10/26/23  0526 10/22/23  0512 10/21/23  0637   HGB  --   --  8.3*   INR 3.59*   < > 2.54*   WBC  --   --  7.3    < > = values in this interval not displayed.     Pressure injury risk assessment:   Sensory Perception: 3-->slightly limited  Moisture: 3-->occasionally moist  Activity: 2-->chairfast  Mobility: 3-->slightly limited  Nutrition: 3-->adequate  Friction and Shear: 2-->potential problem  Suresh Score: 16    Mariana Thompson, JOHANN, RN, PHN, HNB-BC, CWOCN  Pager no longer is use, please contact through [employer] group: [facility] [employer]

## 2023-10-26 NOTE — PLAN OF CARE
Goal Outcome Evaluation:    Problem: Adult Inpatient Plan of Care  Goal: Plan of Care Review  Description: The Plan of Care Review/Shift note should be completed every shift.  The Outcome Evaluation is a brief statement about your assessment that the patient is improving, declining, or no change.  This information will be displayed automatically on your shift  note.  10/26/2023 0632 by Bee Dinh RN  Outcome: Progressing  10/26/2023 0631 by Bee Dinh RN  Outcome: Progressing     Problem: Wound  Goal: Optimal Coping  Outcome: Progressing  Intervention: Support Patient and Family Response  Recent Flowsheet Documentation  Taken 10/26/2023 0130 by Bee Dinh RN  Supportive Measures: active listening utilized  Goal: Optimal Functional Ability  Outcome: Progressing  Intervention: Optimize Functional Ability  Recent Flowsheet Documentation  Taken 10/26/2023 0130 by Bee Dinh RN  Assistive Device Utilized: lift device  Activity Management: activity adjusted per tolerance  Activity Assistance Provided: assistance, 2 people     Problem: Anxiety Signs/Symptoms  Goal: Optimized Energy Level (Anxiety Signs/Symptoms)  10/26/2023 0632 by Bee Dinh RN  Outcome: Progressing  10/26/2023 0631 by Bee Dinh RN  Outcome: Progressing     Problem: Pain Acute  Goal: Optimal Pain Control and Function  Outcome: Progressing  Intervention: Prevent or Manage Pain  Recent Flowsheet Documentation  Taken 10/26/2023 0130 by Bee Dinh RN  Sensory Stimulation Regulation: care clustered  Medication Review/Management: medications reviewed  Intervention: Optimize Psychosocial Wellbeing  Recent Flowsheet Documentation  Taken 10/26/2023 0130 by Bee Dinh RN  Supportive Measures: active listening utilized   Pt A/ O X 4, vital signs stable, pt reports pain in  left BKA , rates pain 6/10, but refuses pain medications, left BKA dressing saturated with serosanguinous drainage, but pt refused dressing to be changed and wants to wait until seen by  WOC nurse, pt calm and slept most of shift, call light in reach and encourage pt to call with needs.

## 2023-10-26 NOTE — PROGRESS NOTES
"   10/25/23 0923   Appointment Info   Signing Clinician's Name / Credentials (PT) Stephanie Singh, PT   Rehab Comments (PT) L BKA, Room Air   Living Environment   People in Home alone   Current Living Arrangements apartment   Living Environment Comments Lives in a [address] building, has elevator   Self-Care   Usual Activity Tolerance good   Current Activity Tolerance fair   Activity/Exercise/Self-Care Comment Pt reports she likes to read and journal \"I do whatever I want\"   General Information   Onset of Illness/Injury or Date of Surgery 09/29/23   Referring Physician Miguel Shannon MD   Patient/Family Therapy Goals Statement (PT) Return home in .   Pertinent History of Current Problem (include personal factors and/or comorbidities that impact the POC) Per MD H&P: Brought in 9/23 after welfare check by police found to have L leg swelling and erythema.  Hx venous stasis ulcers. Presents with L heel decubitus ulcer with osteo. Wound with multiple organisms incl pseudomonas, morganella, e faecalis, bordetella, proteus status underwent debridement 9/26 by podiatry.10/16: Left guillotine below knee amputation. Acute on chronic pain syndrome. DM, Afib, obesity, HTN   Weight-Bearing Status - RLE full weight-bearing   Posture    Posture Forward head position;Protracted shoulders   Range of Motion (ROM)   ROM Comment ROM WFL in R ankle, deficits in knee ROM unable to reach full extension, limited ROM L > R hip.   Strength (Manual Muscle Testing)   Strength Comments Strength assessed in supine: R DF:4/5 PF: 5/5; R Knee Ext 4+/5, R Knee Flex 4/5 R Hip 4/5, #xt 5/5; L Hip flex 4-/5 L Hip Ext 4+/5 L Hip abd 5/5, L Hip Add 4+/5   Bed Mobility   Comment, (Bed Mobility) SBA supine > sit, requires extra time for multiple attempts and situating self at EOB, HOB was elevated.   Transfers   Comment, (Transfers) STS from pts elevated bed: Max A x2, unable to acheieve full trunk extension.   Gait/Stairs (Locomotion)   [place] " Level (Gait) not tested  (due to safety concerns)   Balance   Balance Comments Sitting balance EOB: SBA x1, pt uses hands to lean posterior and prop self up. Pertubations applied: pt had no LOB and able to return to [facility] IND.   Sensory Examination   Sensory Perception Comments Unable to feel sensation below mid-shin, able to sense light touch above. RLE: can sense light touch.   Clinical Impression   Criteria for Skilled Therapeutic Intervention Yes, treatment indicated   PT Diagnosis (PT) Impaired functional mobility   Influenced by the following impairments strength, ROM, BKA, activity tolerance   Functional limitations due to impairments transfers, w/c mobility, standing balance   Clinical Presentation (PT Evaluation Complexity) evolving   Clinical Presentation Rationale Per clinical judgement, PMH, and evaluation   Clinical Decision Making (Complexity) low complexity   Planned Therapy Interventions (PT) balance training;bed mobility training;gait training;neuromuscular re-education;patient/family education;postural re-education;ROM (range of motion);stair training;strengthening;stretching;transfer training;wheelchair management/propulsion training;progressive activity/exercise;risk factor education   Risk & Benefits of therapy have been explained evaluation/treatment results reviewed;care plan/treatment goals reviewed;patient;participants voiced agreement with care plan   PT Total Evaluation Time   PT Eval, Low Complexity Minutes (02316) 10   Physical Therapy Goals   PT Frequency 6x/week   PT Predicted Duration/Target Date for Goal Attainment 11/10/23   PT: Transfers Modified independent;Bed to/from chair   PT: Wheelchair Mobility 150 feet;Caregiver SBA;manual wheelchair   PT: Goal 1 Pt will complete STS from w/c with FWW with Min A x1 to assist with functional indepedence and demonstrate increase LE strength   Therapeutic Procedure/Exercise   Ther. Procedure: strength, endurance, ROM, flexibillity Minutes  (38949) 8   Symptoms Noted During/After Treatment fatigue;increased pain   Treatment Detail/Skilled Intervention Facilitated RLE strengthening: ankle pumps, knee flex./ext, hip flex./ext. x10. Pt reports hip exercises are tough. Pain experienced on posterior calf due to previous blisters.   Therapeutic Activity   Therapeutic Activities: dynamic activities to improve functional performance Minutes (11809) 25   Symptoms Noted During/After Treatment None   Treatment Detail/Skilled Intervention Facilitated supine to sit EOB: SBA x1, pt reported wanting no help and able to do so by self. Required multiple scoots to situate self at EOB requiring extra time. Static sitting at EOB: SBA x1, pt sits comfortably in a posterior lean with arms on bed for support but is able to wt shift forward and come off hands. Facilitated STS from pts bed: Max A x2, pt able to wt shift forward but unable to clear bottom fully from bed, VCs to achieve full extension but pt unable to do so IND requesting to sit back down. Pt denied any pain with standing. Scoot to EOB and posterior from EOB: SBA x1, sit > supine: SBA x1, pt requires extra time for multiple wt shift attempts and scooting. Min A for set-up. Writer took extra time to educate pt on POC, goals, and desensitization for residual limb, pt verbalized understanding and was receptive of education.   PT Discharge Planning   PT Plan LE strength and ROM, STS, transfers, w/c mobility   PT Discharge Recommendation (DC Rec) Transitional Care Facility;home   PT Rationale for DC Rec Pt lives alone in an apartment and will not have 24/7 supervision however pt is motivated to return home with AD. Pt currently does not required any O2, feed tube, etc. however has poor to no sensation of R foot due to previous diagnosis of neuropathy.   PT Brief overview of current status PT eval completed. Pt insistent on completing bed mobility IND and is able to do. STS Max A x2 and pt unable to clear bottom from  bed. Pt very receptive and appreciative of writers education and POC.   Total Session Time   Timed Code Treatment Minutes 33   Total Session Time (sum of timed and untimed services) 43   Post Acute Settings Only   What unit is patient on? LTACH   Roll Left and Right   Patient Performance Independent   Sit to Lying   Patient Performance Independent   Lying to Sitting on Side of Bed   Patient Performance Independent   Sit to Stand   Patient Performance Substantial/maximal assist   Chair/Bed to Chair Transfer   Reason if not Attempted Safety concerns   Walk 10 feet   Reason if not Attempted Safety concerns   Walk 50 feet with Two Turns   Reason if not Attempted Safety concerns   Walk 150 feet   Reason if not Attempted Safety concerns   Walk 10 Feet on Uneven Surfaces   Reason if not Attempted Safety concerns   Wheel 50 Feet With Two Turns   Reason if not Attempted Safety concerns   Wheel 150 feet   Reason if not Attempted Safety concerns   Discharge Goal (Admit only) INDEPENDENT   1 Step   Reason if not Attempted Safety concerns   4 Steps   Reason if not Attempted Safety concerns   12 Steps   Reason if not Attempted Safety concerns   Car Transfer   Reason if not Attempted Environmental limitations (e.g., lack of equipment,weather constraints)   Picking up objects   Reason if not Attempted Safety concerns

## 2023-10-26 NOTE — PLAN OF CARE
Problem: Wound  Goal: Absence of Infection Signs and Symptoms  Intervention: Prevent or Manage Infection  Recent Flowsheet Documentation  Taken 10/25/2023 2215 by Pari Mitchell RN  Isolation Precautions: contact precautions maintained     Problem: Wound  Goal: Optimal Pain Control and Function  Outcome: Progressing   Goal Outcome Evaluation:       Patient was pleasant and cooperative with cares but was very particular on how the left BKA wound dressing should be  done and the  products ordered. Patient refused dressing , wanted the WOC to see the wound first. Schedule pain medication given with effect. PRN pain medication given  once.

## 2023-10-26 NOTE — PROGRESS NOTES
[facility]    Medicine Progress Note - Hospitalist Service    Date of Admission:  10/24/2023    Hospital Course  Linda Loredo is a 78y F PMHx IDDM, chronic venous stasis, chronic pain syndrome, afib, HTN, HLD, CKD3, BERLIN, and FTT who presents to [facility] for ongoing wound care and therapies. Pt initially presented 9/23 to [facility] after welfare check by police. She was found to have L leg swelling and edema and was diagnosed with polymicrobial osteomyelitis. She is s/p debridement by podiatry on 9/26. Bcx negative at the time. ALMA with poor flow and pt was transferred to [facility] at the [facility] for vascular surgery evaluation. Pt managed with meropenem and vancomycin at the time for osteo. 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 Vascular surgery was unable to close the stump at the time and elected for ongoing healing prior to surgical closure thus she was discharged to [facility] for ongoing wound care and therapy.      [facility] Course  10/26: miralax BID, senna 2Q BID, MoM TID prn. If no BM by Sat morning, increase regimen.    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)  #S/p Tibial/Fibular Resection and Debridement of Stump (10/17)  #Chronic Venous Stasis  - completed course of meropenem/vancomycin 10/8 for osteomyelitis  - vascular surgery planning to close in the future, monthly clinic visits for wound checks with closure at unspecified time in the future  - dilaudid 4mg PO and 0.3mg IV prn pain control (PO 30min prior and IV 15min prior to dressing changes)  - WOC consulted and following  - PT/OT    #Acute on Chronic Pain Syndrome  - methadone 5mg TID and 10mg @ 1500  - Dilaudid IV/PO prn pain and with dressing changes  - previous Psych eval PTA, no associated depression    #Fecal Impaction - resolved  #Constipation   - manual disimpaction (10/25)  - dulcolax 10mg x1, later 20mg x1  "(10/25)  - fleet enema (10/25)  - soc-senna 2Q BID (10/26)  - miralax BID (10/26)  - MoM TID prn  - viscous lidocaine for anal pain  - if no additional BM by 10/28 will increase regimen    #IDDM  - pt on degludec 35u qHS, metformin 2000mg daily, ozempic PTA  - continue insulin degludec 12u qHS   - medium ISS    #Afib  #HTN  #HLD  - simvastatin 20mg daily  - warfarin, pharmacy to dose  - continue holding PTA lisinopril 5mg per pt wishes, can restart after discharge    #CKD3  - baseline Cr 1-1.2  - holding lisinopril    #FTT  - nutrition consult  - SW following    #BERLIN  - refusing CPAP            Diet: Consistent Carbohydrate Diet Moderate Consistent Carb (60 g CHO per Meal) Diet  Snacks/Supplements Adult: Glucerna; Between Meals  Snacks/Supplements Adult: Gelatein Plus; With Meals    DVT Prophylaxis: Warfarin  Braga Catheter: Not present  Lines: PRESENT      PICC 10/09/23 Triple Lumen Right Basilic-Site Assessment: WDL      Cardiac Monitoring: None  Code Status: Full Code      Clinically Significant Risk Factors                  # Hypertension: Noted on problem list       # DMII: A1C = 7.0 % (Ref range: <5.7 %) within past 6 months, PRESENT ON ADMISSION  # Severe Obesity: Estimated body mass index is 40.84 kg/m  as calculated from the following:    Height as of 9/29/23: 1.676 m (5' 6\").    Weight as of this encounter: 114.8 kg (253 lb)., PRESENT ON ADMISSION            Disposition Plan     Expected Discharge Date: 10/30/2023    Discharge Delays: Complex Discharge  Destination: inpatient rehabilitation facility  Discharge Comments: Wound Care-complex. Pain management.            Miguel Shannon MD  Hospitalist Service  [facility]  Securely message with [employer] (more info)  Text page via [employer] Paging/Directory   ______________________________________________________________________    Interval History   - no acute events ovn  - pt feels much better today after multiple BM y/d, all of which were hard  - discussed continuing " bowel regimen until another good BM occurs to clean her out  - pt skipped senna last night and this morning  - discussed preventing impaction again  - no other acute concerns    Physical Exam   Vital Signs: Temp: 97.9  F (36.6  C) Temp src: Oral BP: (!) 141/78 Pulse: 88   Resp: 22 SpO2: 96 % O2 Device: None (Room air)    Weight: 253 lbs 0 oz  Gen: awake, alert, normal appearing, comfortable  HEET: EOMI, MMM  CV: RRR, nl s1s2, no m/r/g  Pulm: CTAB, good respiratory effort, no w/r/r  GI: soft, NT/ND  MSK: L BKA with dressing in place  Integ: see WOC note for full assessment  Psych: normal mood, normal affect       Medical Decision Making       50 MINUTES SPENT BY ME on the date of service doing chart review, history, exam, documentation & further activities per the note.      Data     I have personally reviewed the following data over the past 24 hrs:    INR:  N/A PTT:  N/A   D-dimer:  N/A Fibrinogen:  N/A       Imaging results reviewed over the past 24 hrs:   No results found for this or any previous visit (from the past 24 hour(s)).

## 2023-10-27 ENCOUNTER — APPOINTMENT (OUTPATIENT)
Dept: PHYSICAL THERAPY | Facility: CLINIC | Age: 78
DRG: 560 | End: 2023-10-27
Attending: INTERNAL MEDICINE
Payer: COMMERCIAL

## 2023-10-27 LAB
GLUCOSE BLDC GLUCOMTR-MCNC: 109 MG/DL (ref 70–99)
GLUCOSE BLDC GLUCOMTR-MCNC: 143 MG/DL (ref 70–99)
GLUCOSE BLDC GLUCOMTR-MCNC: 178 MG/DL (ref 70–99)
GLUCOSE BLDC GLUCOMTR-MCNC: 194 MG/DL (ref 70–99)
INR PPP: 3.54 (ref 0.85–1.15)

## 2023-10-27 PROCEDURE — 250N000013 HC RX MED GY IP 250 OP 250 PS 637: Performed by: INTERNAL MEDICINE

## 2023-10-27 PROCEDURE — 85610 PROTHROMBIN TIME: CPT | Performed by: STUDENT IN AN ORGANIZED HEALTH CARE EDUCATION/TRAINING PROGRAM

## 2023-10-27 PROCEDURE — 99232 SBSQ HOSP IP/OBS MODERATE 35: CPT | Performed by: STUDENT IN AN ORGANIZED HEALTH CARE EDUCATION/TRAINING PROGRAM

## 2023-10-27 PROCEDURE — 97542 WHEELCHAIR MNGMENT TRAINING: CPT | Mod: GP | Performed by: PHYSICAL THERAPIST

## 2023-10-27 PROCEDURE — 120N000017 HC R&B RESPIRATORY CARE

## 2023-10-27 PROCEDURE — 250N000013 HC RX MED GY IP 250 OP 250 PS 637: Performed by: STUDENT IN AN ORGANIZED HEALTH CARE EDUCATION/TRAINING PROGRAM

## 2023-10-27 PROCEDURE — 250N000011 HC RX IP 250 OP 636: Performed by: STUDENT IN AN ORGANIZED HEALTH CARE EDUCATION/TRAINING PROGRAM

## 2023-10-27 PROCEDURE — 97530 THERAPEUTIC ACTIVITIES: CPT | Mod: GP | Performed by: PHYSICAL THERAPIST

## 2023-10-27 RX ORDER — MINERAL OIL/HYDROPHIL PETROLAT
OINTMENT (GRAM) TOPICAL
Status: DISCONTINUED | OUTPATIENT
Start: 2023-10-27 | End: 2023-11-13

## 2023-10-27 RX ADMIN — SENNOSIDES AND DOCUSATE SODIUM 2 TABLET: 50; 8.6 TABLET ORAL at 08:39

## 2023-10-27 RX ADMIN — MICONAZOLE NITRATE: 20 POWDER TOPICAL at 08:41

## 2023-10-27 RX ADMIN — METHADONE HYDROCHLORIDE 5 MG: 5 TABLET ORAL at 21:49

## 2023-10-27 RX ADMIN — POLYETHYLENE GLYCOL 3350 17 G: 17 POWDER, FOR SOLUTION ORAL at 08:39

## 2023-10-27 RX ADMIN — MULTIPLE VITAMINS W/ MINERALS TAB 1 TABLET: TAB at 08:39

## 2023-10-27 RX ADMIN — HYDROMORPHONE HYDROCHLORIDE 0.3 MG: 1 INJECTION, SOLUTION INTRAMUSCULAR; INTRAVENOUS; SUBCUTANEOUS at 11:00

## 2023-10-27 RX ADMIN — WARFARIN SODIUM 1.5 MG: 3 TABLET ORAL at 17:20

## 2023-10-27 RX ADMIN — POLYETHYLENE GLYCOL 3350 17 G: 17 POWDER, FOR SOLUTION ORAL at 21:52

## 2023-10-27 RX ADMIN — SENNOSIDES AND DOCUSATE SODIUM 2 TABLET: 50; 8.6 TABLET ORAL at 21:50

## 2023-10-27 RX ADMIN — INSULIN ASPART 1 UNITS: 100 INJECTION, SOLUTION INTRAVENOUS; SUBCUTANEOUS at 17:19

## 2023-10-27 RX ADMIN — ASPIRIN 81 MG: 81 TABLET, COATED ORAL at 08:39

## 2023-10-27 RX ADMIN — HYDROMORPHONE HYDROCHLORIDE 4 MG: 4 TABLET ORAL at 10:31

## 2023-10-27 RX ADMIN — TORSEMIDE 15 MG: 10 TABLET ORAL at 08:39

## 2023-10-27 RX ADMIN — ATORVASTATIN CALCIUM 40 MG: 40 TABLET, FILM COATED ORAL at 21:49

## 2023-10-27 RX ADMIN — METHADONE HYDROCHLORIDE 5 MG: 5 TABLET ORAL at 13:33

## 2023-10-27 RX ADMIN — INSULIN DEGLUDEC INJECTION 12 UNITS: 100 INJECTION, SOLUTION SUBCUTANEOUS at 21:52

## 2023-10-27 RX ADMIN — INSULIN ASPART 1 UNITS: 100 INJECTION, SOLUTION INTRAVENOUS; SUBCUTANEOUS at 11:49

## 2023-10-27 RX ADMIN — METHADONE HYDROCHLORIDE 5 MG: 5 TABLET ORAL at 08:40

## 2023-10-27 RX ADMIN — METHADONE HYDROCHLORIDE 10 MG: 10 TABLET ORAL at 15:49

## 2023-10-27 ASSESSMENT — ACTIVITIES OF DAILY LIVING (ADL)
ADLS_ACUITY_SCORE: 39
ADLS_ACUITY_SCORE: 43
ADLS_ACUITY_SCORE: 39
ADLS_ACUITY_SCORE: 43
ADLS_ACUITY_SCORE: 39
ADLS_ACUITY_SCORE: 43
ADLS_ACUITY_SCORE: 39
ADLS_ACUITY_SCORE: 43

## 2023-10-27 NOTE — PLAN OF CARE
Problem: Wound  Goal: Optimal Coping  Outcome: Progressing  Intervention: Support Patient and Family Response  Recent Flowsheet Documentation  Taken 10/27/2023 1100 by Ev Lopez RN  Supportive Measures: active listening utilized     Problem: Pain Acute  Goal: Optimal Pain Control and Function  Outcome: Progressing  Intervention: Develop Pain Management Plan  Recent Flowsheet Documentation  Taken 10/27/2023 0812 by Ev Lopez RN  Pain Management Interventions: medication (see MAR)  Intervention: Optimize Psychosocial Wellbeing  Recent Flowsheet Documentation  Taken 10/27/2023 1100 by Ev Lopez RN  Supportive Measures: active listening utilized     Problem: Adult Inpatient Plan of Care  Goal: Absence of Hospital-Acquired Illness or Injury  Intervention: Prevent Infection  Recent Flowsheet Documentation  Taken 10/27/2023 1100 by Ev Lopez RN  Infection Prevention:   hand hygiene promoted   rest/sleep promoted   single patient room provided   equipment surfaces disinfected  Goal: Optimal Comfort and Wellbeing  Intervention: Monitor Pain and Promote Comfort  Recent Flowsheet Documentation  Taken 10/27/2023 0812 by Ev Lopez RN  Pain Management Interventions: medication (see MAR)     Problem: Wound  Goal: Absence of Infection Signs and Symptoms  Intervention: Prevent or Manage Infection  Recent Flowsheet Documentation  Taken 10/27/2023 1100 by Ev Lopez RN  Isolation Precautions: contact precautions maintained  Goal: Optimal Pain Control and Function  Intervention: Prevent or Manage Pain  Recent Flowsheet Documentation  Taken 10/27/2023 0812 by Ev Lopez RN  Pain Management Interventions: medication (see MAR)     Problem: Anxiety Signs/Symptoms  Goal: Improved Mood Symptoms (Anxiety Signs/Symptoms)  Intervention: Optimize Emotion and Mood  Recent Flowsheet Documentation  Taken 10/27/2023 1100 by Ev Lopez  LOGAN, RN  Supportive Measures: active listening utilized   Goal Outcome Evaluation:  Patient wound dressing done, prior to that, multiple PRN medications given per MD orders. Patient was intermittently irritable, staff continue to offer support and encouragement. Patient attended PT sessions. Plan of care continues.

## 2023-10-27 NOTE — PLAN OF CARE
Problem: Adult Inpatient Plan of Care  Goal: Optimal Comfort and Wellbeing  Intervention: Monitor Pain and Promote Comfort  Recent Flowsheet Documentation  Taken 10/26/2023 2339 by Gino Mcghee RN  Pain Management Interventions: medication (see MAR)     Problem: Anxiety Signs/Symptoms  Goal: Optimized Energy Level (Anxiety Signs/Symptoms)  Outcome: Progressing     Problem: Pain Acute  Goal: Optimal Pain Control and Function  Outcome: Progressing  Intervention: Develop Pain Management Plan  Recent Flowsheet Documentation  Taken 10/26/2023 2339 by Gino Mcghee RN  Pain Management Interventions: medication (see MAR)  Intervention: Prevent or Manage Pain  Recent Flowsheet Documentation  Taken 10/27/2023 0200 by Gino Mcghee RN  Sensory Stimulation Regulation: care clustered  Medication Review/Management: medications reviewed   Goal Outcome Evaluation:    Pt c/o generalized pain 6/10, but she said this is normal for her and doesn't need any intervention.  Vitals were stable and slept comfortably most of the night.

## 2023-10-27 NOTE — PLAN OF CARE
Goal Outcome Evaluation:  Patient was sleeping when writer and student nurse checked on her at about 7:15. Patient woke up later and stated she was waiting for medication specifically Methadone. When writer and student nurse went to administer morning medications, pt was irritable and declined nurse to show her medications before removing them from package. Writer insisted she needs to see medications before she remove them but pt declined. All medications scanned and removed from package and gave to her. Few seconds later, pt stated she needs to know which medication was methadone and where it was. When nurse and student  nurse looked into med cup, Methadone was not in the med cap. Nurse asked pt if okay to check her bed, pt became more irritable and stated we are accusing her that she took the medication and also told someone who called her on the phone. Few seconds later, pt pointed to medication which was on her bed. Writer and  student nurse were  at her bedside to supervise patient taking all her medications.  Patient declined wound dressing at this time, will re approach later.

## 2023-10-27 NOTE — PLAN OF CARE
"Goal Outcome Evaluation:       Alert and oriented x4. Complains of pain 6/10, pt states \"my pain is always a steady 6/10\". No PRNs given on evening shift. . Uses call light appropriately. Mood calm and coooperative. Will continue with care of plan.     Asuncion Drummond RN                   "

## 2023-10-27 NOTE — PROGRESS NOTES
[facility]    Medicine Progress Note - Hospitalist Service    Date of Admission:  10/24/2023    Hospital Course  Linda Loredo is a 78y F PMHx IDDM, chronic venous stasis, chronic pain syndrome, afib, HTN, HLD, CKD3, BERLIN, and FTT who presents to [facility] for ongoing wound care and therapies. Pt initially presented 9/23 to [facility] after welfare check by police. She was found to have L leg swelling and edema and was diagnosed with polymicrobial osteomyelitis. She is s/p debridement by podiatry on 9/26. Bcx negative at the time. ALMA with poor flow and pt was transferred to [facility] for vascular surgery evaluation. Pt managed with meropenem and vancomycin at the time for osteo. 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 Vascular surgery was unable to close the stump at the time and elected for ongoing healing prior to surgical closure thus she was discharged to [facility] for ongoing wound care and therapy.      [facility] Course  10/26: miralax BID, senna 2Q BID, MoM TID prn. If no BM by Sat morning, increase regimen.  10/27: RLE with redness, likely dry skin, aquaphor ordered. No BM yet but passing gas. WOC inspected dressing today and provided pt with instructions for dressing change at her request.     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)  #S/p Tibial/Fibular Resection and Debridement of Stump (10/17)  #Chronic Venous Stasis  - completed course of meropenem/vancomycin 10/8 for osteomyelitis  - vascular surgery planning to close in the future, monthly clinic visits for wound checks with closure at unspecified time in the future  - dilaudid 4mg PO and 0.3mg IV prn pain control (PO 30min prior and IV 15min prior to dressing changes)  - WOC consulted and following  - PT/OT    #Acute on Chronic Pain Syndrome  - methadone 5mg TID and 10mg @ 1500  - Dilaudid IV/PO prn pain and  "with dressing changes  - previous Psych eval PTA, no associated depression    #Fecal Impaction - resolved  #Constipation   - manual disimpaction (10/25)  - dulcolax 10mg x1, later 20mg x1 (10/25)  - fleet enema (10/25)  - soc-senna 2Q BID (10/26)  - miralax BID (10/26)  - MoM TID prn  - viscous lidocaine for anal pain  - if no additional BM by 10/28 will increase regimen    #IDDM  - pt on degludec 35u qHS, metformin 2000mg daily, ozempic PTA  - continue insulin degludec 12u qHS   - medium ISS    #Afib  #HTN  #HLD  - simvastatin 20mg daily  - warfarin, pharmacy to dose  - continue holding PTA lisinopril 5mg per pt wishes, can restart after discharge    #CKD3  - baseline Cr 1-1.2  - holding lisinopril    #FTT  - nutrition consult  - SW following    #BERLIN  - refusing CPAP            Diet: Consistent Carbohydrate Diet Moderate Consistent Carb (60 g CHO per Meal) Diet  Snacks/Supplements Adult: Glucerna; Between Meals  Snacks/Supplements Adult: Gelatein Plus; With Meals    DVT Prophylaxis: Warfarin  Braga Catheter: Not present  Lines: PRESENT      PICC 10/09/23 Triple Lumen Right Basilic-Site Assessment: WDL      Cardiac Monitoring: None  Code Status: Full Code      Clinically Significant Risk Factors                  # Hypertension: Noted on problem list       # DMII: A1C = 7.0 % (Ref range: <5.7 %) within past 6 months, PRESENT ON ADMISSION  # Severe Obesity: Estimated body mass index is 40.19 kg/m  as calculated from the following:    Height as of 9/29/23: 1.676 m (5' 6\").    Weight as of this encounter: 112.9 kg (249 lb)., PRESENT ON ADMISSION            Disposition Plan     Expected Discharge Date: 10/30/2023    Discharge Delays: Complex Discharge  Destination: inpatient rehabilitation facility  Discharge Comments: Wound Care-complex. Pain management.            Miguel Shannon MD  Hospitalist Service  [facility]  Securely message with [employer] (more info)  Text page via [facility] Paging/Directory " "  ______________________________________________________________________    Interval History   - no acute events ovn  - pt awake sitting up in bed  - had dressing change to BKA today  - no BM yet but pt can feel \"rumblings\"  - asking about her pain clinic visit, discussed that we will manage pain meds while here and will likely not need visit, she will call the clinic herself to inform of her situation  - some redness to RLE below the knee, pt concerned may lead to infection similar to what happened with her LLE  - no itching, pain, swelling, warmth around redness  - no other acute concerns    Physical Exam   Vital Signs: Temp: 97.7  F (36.5  C) Temp src: Oral BP: 136/61 Pulse: 75   Resp: 18 SpO2: 96 % O2 Device: None (Room air)    Weight: 249 lbs 0 oz  Gen: awake, alert, normal appearing, comfortable  HEET: EOMI, MMM  CV: RRR, nl s1s2, no m/r/g  Pulm: CTAB, good respiratory effort, no w/r/r  GI: soft, NT/ND  MSK: L BKA with dressing in place  Integ: see WOC note for full assessment, RLE with erythema over anterolateral shin below the knee down to the foot, no warmth or swelling  Psych: normal mood, normal affect       Medical Decision Making       45 MINUTES SPENT BY ME on the date of service doing chart review, history, exam, documentation & further activities per the note.      Data     I have personally reviewed the following data over the past 24 hrs:    INR:  N/A PTT:  N/A   D-dimer:  N/A Fibrinogen:  N/A       Imaging results reviewed over the past 24 hrs:   No results found for this or any previous visit (from the past 24 hour(s)).  "

## 2023-10-28 ENCOUNTER — APPOINTMENT (OUTPATIENT)
Dept: PHYSICAL THERAPY | Facility: CLINIC | Age: 78
DRG: 560 | End: 2023-10-28
Attending: INTERNAL MEDICINE
Payer: COMMERCIAL

## 2023-10-28 ENCOUNTER — APPOINTMENT (OUTPATIENT)
Dept: OCCUPATIONAL THERAPY | Facility: CLINIC | Age: 78
DRG: 560 | End: 2023-10-28
Attending: INTERNAL MEDICINE
Payer: COMMERCIAL

## 2023-10-28 LAB
GLUCOSE BLDC GLUCOMTR-MCNC: 104 MG/DL (ref 70–99)
GLUCOSE BLDC GLUCOMTR-MCNC: 129 MG/DL (ref 70–99)
GLUCOSE BLDC GLUCOMTR-MCNC: 199 MG/DL (ref 70–99)
GLUCOSE BLDC GLUCOMTR-MCNC: 201 MG/DL (ref 70–99)
INR PPP: 2.96 (ref 0.85–1.15)

## 2023-10-28 PROCEDURE — 120N000017 HC R&B RESPIRATORY CARE

## 2023-10-28 PROCEDURE — 99232 SBSQ HOSP IP/OBS MODERATE 35: CPT | Performed by: STUDENT IN AN ORGANIZED HEALTH CARE EDUCATION/TRAINING PROGRAM

## 2023-10-28 PROCEDURE — 250N000011 HC RX IP 250 OP 636: Performed by: STUDENT IN AN ORGANIZED HEALTH CARE EDUCATION/TRAINING PROGRAM

## 2023-10-28 PROCEDURE — 97530 THERAPEUTIC ACTIVITIES: CPT | Mod: GP

## 2023-10-28 PROCEDURE — 250N000013 HC RX MED GY IP 250 OP 250 PS 637: Performed by: STUDENT IN AN ORGANIZED HEALTH CARE EDUCATION/TRAINING PROGRAM

## 2023-10-28 PROCEDURE — 85610 PROTHROMBIN TIME: CPT | Performed by: STUDENT IN AN ORGANIZED HEALTH CARE EDUCATION/TRAINING PROGRAM

## 2023-10-28 PROCEDURE — 250N000013 HC RX MED GY IP 250 OP 250 PS 637: Performed by: INTERNAL MEDICINE

## 2023-10-28 PROCEDURE — 97535 SELF CARE MNGMENT TRAINING: CPT | Mod: GO | Performed by: OCCUPATIONAL THERAPIST

## 2023-10-28 RX ORDER — AMOXICILLIN 250 MG
2 CAPSULE ORAL ONCE
Status: COMPLETED | OUTPATIENT
Start: 2023-10-28 | End: 2023-10-28

## 2023-10-28 RX ORDER — AMOXICILLIN 250 MG
4 CAPSULE ORAL 2 TIMES DAILY
Status: DISCONTINUED | OUTPATIENT
Start: 2023-10-28 | End: 2023-10-29

## 2023-10-28 RX ORDER — WARFARIN SODIUM 2 MG/1
2 TABLET ORAL
Status: COMPLETED | OUTPATIENT
Start: 2023-10-28 | End: 2023-10-28

## 2023-10-28 RX ADMIN — HYDROMORPHONE HYDROCHLORIDE 4 MG: 4 TABLET ORAL at 10:30

## 2023-10-28 RX ADMIN — POLYETHYLENE GLYCOL 3350 17 G: 17 POWDER, FOR SOLUTION ORAL at 21:34

## 2023-10-28 RX ADMIN — MICONAZOLE NITRATE: 20 POWDER TOPICAL at 08:12

## 2023-10-28 RX ADMIN — MICONAZOLE NITRATE: 20 POWDER TOPICAL at 21:32

## 2023-10-28 RX ADMIN — SENNOSIDES AND DOCUSATE SODIUM 2 TABLET: 50; 8.6 TABLET ORAL at 08:09

## 2023-10-28 RX ADMIN — METHADONE HYDROCHLORIDE 5 MG: 5 TABLET ORAL at 13:23

## 2023-10-28 RX ADMIN — INSULIN ASPART 2 UNITS: 100 INJECTION, SOLUTION INTRAVENOUS; SUBCUTANEOUS at 17:15

## 2023-10-28 RX ADMIN — MULTIPLE VITAMINS W/ MINERALS TAB 1 TABLET: TAB at 08:09

## 2023-10-28 RX ADMIN — METHADONE HYDROCHLORIDE 5 MG: 5 TABLET ORAL at 08:09

## 2023-10-28 RX ADMIN — INSULIN DEGLUDEC INJECTION 12 UNITS: 100 INJECTION, SOLUTION SUBCUTANEOUS at 21:38

## 2023-10-28 RX ADMIN — SENNOSIDES AND DOCUSATE SODIUM 2 TABLET: 50; 8.6 TABLET ORAL at 21:30

## 2023-10-28 RX ADMIN — METHADONE HYDROCHLORIDE 10 MG: 10 TABLET ORAL at 15:14

## 2023-10-28 RX ADMIN — TORSEMIDE 15 MG: 10 TABLET ORAL at 08:09

## 2023-10-28 RX ADMIN — METHADONE HYDROCHLORIDE 5 MG: 5 TABLET ORAL at 21:28

## 2023-10-28 RX ADMIN — HYDROMORPHONE HYDROCHLORIDE 0.3 MG: 1 INJECTION, SOLUTION INTRAMUSCULAR; INTRAVENOUS; SUBCUTANEOUS at 11:03

## 2023-10-28 RX ADMIN — WARFARIN SODIUM 2 MG: 2 TABLET ORAL at 19:02

## 2023-10-28 RX ADMIN — ASPIRIN 81 MG: 81 TABLET, COATED ORAL at 08:09

## 2023-10-28 RX ADMIN — POLYETHYLENE GLYCOL 3350 17 G: 17 POWDER, FOR SOLUTION ORAL at 08:09

## 2023-10-28 RX ADMIN — ATORVASTATIN CALCIUM 40 MG: 40 TABLET, FILM COATED ORAL at 21:29

## 2023-10-28 ASSESSMENT — ACTIVITIES OF DAILY LIVING (ADL)
ADLS_ACUITY_SCORE: 43
ADLS_ACUITY_SCORE: 45
ADLS_ACUITY_SCORE: 43
ADLS_ACUITY_SCORE: 45
ADLS_ACUITY_SCORE: 43
ADLS_ACUITY_SCORE: 43
ADLS_ACUITY_SCORE: 45

## 2023-10-28 NOTE — PLAN OF CARE
Problem: Wound  Goal: Optimal Coping  Outcome: Progressing  Intervention: Support Patient and Family Response  Recent Flowsheet Documentation  Taken 10/28/2023 0800 by Ev Lopez RN  Supportive Measures: active listening utilized     Problem: Pain Acute  Goal: Optimal Pain Control and Function  Outcome: Progressing  Intervention: Optimize Psychosocial Wellbeing  Recent Flowsheet Documentation  Taken 10/28/2023 0800 by Ev Lopez RN  Supportive Measures: active listening utilized     Problem: Adult Inpatient Plan of Care  Goal: Absence of Hospital-Acquired Illness or Injury  Intervention: Prevent Infection  Recent Flowsheet Documentation  Taken 10/28/2023 0800 by Ev Lopez RN  Infection Prevention:   hand hygiene promoted   rest/sleep promoted   single patient room provided   equipment surfaces disinfected     Problem: Wound  Goal: Absence of Infection Signs and Symptoms  Intervention: Prevent or Manage Infection  Recent Flowsheet Documentation  Taken 10/28/2023 0800 by Ev Lopez RN  Isolation Precautions: contact precautions maintained     Problem: Anxiety Signs/Symptoms  Goal: Improved Mood Symptoms (Anxiety Signs/Symptoms)  Intervention: Optimize Emotion and Mood  Recent Flowsheet Documentation  Taken 10/28/2023 0800 by Ev Lopez RN  Supportive Measures: active listening utilized   Goal Outcome Evaluation:  Patient noted to be pleasant this shift. Patient told writer she was sorry about her behaviors exhibited yesterday. Patient stated she is appreciative of cares given but at times get frustrated. Prn dilaudid PO and IV given per orders prior to BKA wound dressing change. Patient assisted by PT / OT staff to commode during therapy session. Patient had large BM. Md ordered additional bowel medications to relief constipation but patient declined. Family at bedside visiting.  Will continue plan of care.

## 2023-10-28 NOTE — PROGRESS NOTES
[facility]    Medicine Progress Note - Hospitalist Service    Date of Admission:  10/24/2023    Hospital Course  Linda Loredo is a 78y F PMHx IDDM, chronic venous stasis, chronic pain syndrome, afib, HTN, HLD, CKD3, BERLIN, and FTT who presents to [facility] for ongoing wound care and therapies. Pt initially presented 9/23 to [facility] after welfare check by police. She was found to have L leg swelling and edema and was diagnosed with polymicrobial osteomyelitis. She is s/p debridement by podiatry on 9/26. Bcx negative at the time. ALMA with poor flow and pt was transferred to [facility] for vascular surgery evaluation. Pt managed with meropenem and vancomycin at the time for osteo. 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 Vascular surgery was unable to close the stump at the time and elected for ongoing healing prior to surgical closure thus she was discharged to [facility] for ongoing wound care and therapy.      [facility] Course  10/26: miralax BID, senna 2Q BID, MoM TID prn. If no BM by Sat morning, increase regimen.  10/27: RLE with redness, likely dry skin, aquaphor ordered. No BM yet but passing gas. WOC inspected dressing today and provided pt with instructions for dressing change at her request.   10/28: increased to senna 4Q BID, MoM TID scheduled until adequate BMs.    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)  #S/p Tibial/Fibular Resection and Debridement of Stump (10/17)  #Chronic Venous Stasis  - completed course of meropenem/vancomycin 10/8 for osteomyelitis  - vascular surgery planning to close in the future, monthly clinic visits for wound checks with closure at unspecified time in the future  - dilaudid 4mg PO and 0.3mg IV prn pain control (PO 30min prior and IV 15min prior to dressing changes)  - WOC consulted and following  - PT/OT    #Fecal Impaction -  "resolved  #Constipation   - manual disimpaction (10/25)  - dulcolax 10mg x1, later 20mg x1 (10/25)  - fleet enema (10/25)  - doc-senna 4Q BID (10/28)  - miralax BID (10/26)  - MoM TID scheduled (10/28)  - viscous lidocaine for anal pain  - if no additional BM by 10/28 will increase regimen    #Acute on Chronic Pain Syndrome  - methadone 5mg TID and 10mg @ 1500  - Dilaudid IV/PO prn pain and with dressing changes  - previous Psych eval PTA, no associated depression    #IDDM  - pt on degludec 35u qHS, metformin 2000mg daily, ozempic PTA  - continue insulin degludec 12u qHS   - medium ISS    #Afib  #HTN  #HLD  - simvastatin 20mg daily  - warfarin, pharmacy to dose  - continue holding PTA lisinopril 5mg per pt wishes, can restart after discharge    #CKD3  - baseline Cr 1-1.2  - holding lisinopril    #FTT  - nutrition consult  - SW following    #BERLIN  - refusing CPAP            Diet: Consistent Carbohydrate Diet Moderate Consistent Carb (60 g CHO per Meal) Diet  Snacks/Supplements Adult: Glucerna; Between Meals  Snacks/Supplements Adult: Gelatein Plus; With Meals    DVT Prophylaxis: Warfarin  Braga Catheter: Not present  Lines: PRESENT      PICC 10/09/23 Triple Lumen Right Basilic-Site Assessment: WDL      Cardiac Monitoring: None  Code Status: Full Code      Clinically Significant Risk Factors                  # Hypertension: Noted on problem list       # DMII: A1C = 7.0 % (Ref range: <5.7 %) within past 6 months, PRESENT ON ADMISSION  # Severe Obesity: Estimated body mass index is 40.19 kg/m  as calculated from the following:    Height as of 9/29/23: 1.676 m (5' 6\").    Weight as of this encounter: 112.9 kg (249 lb)., PRESENT ON ADMISSION            Disposition Plan     Expected Discharge Date: 10/30/2023    Discharge Delays: Complex Discharge  Destination: inpatient rehabilitation facility  Discharge Comments: Wound Care-complex. Pain management.            Miguel Shannon MD  Hospitalist Service  [facility]  Securely " message with [employer] (more info)  Text page via [employer] Paging/Directory   ______________________________________________________________________    Interval History   - no acute events ovn  - pleasant today  - no BM yet, agreeable to increase in meds  - pain stable  - no other acute concerns    Physical Exam   Vital Signs: Temp: 98.1  F (36.7  C) Temp src: Oral BP: 134/71 Pulse: 95   Resp: 20 SpO2: 100 % O2 Device: None (Room air)    Weight: 249 lbs 0 oz  Gen: awake, alert, normal appearing, comfortable  HEET: EOMI, MMM  CV: RRR, nl s1s2, no m/r/g  Pulm: CTAB, good respiratory effort, no w/r/r  GI: soft, NT/ND  MSK: L BKA with dressing in place  Integ: see WOC note for full assessment, RLE with erythema over anterolateral shin below the knee down to the foot, no warmth or swelling  Psych: normal mood, normal affect       Medical Decision Making       40 MINUTES SPENT BY ME on the date of service doing chart review, history, exam, documentation & further activities per the note.      Data     I have personally reviewed the following data over the past 24 hrs:    INR:  N/A PTT:  N/A   D-dimer:  N/A Fibrinogen:  N/A       Imaging results reviewed over the past 24 hrs:   No results found for this or any previous visit (from the past 24 hour(s)).

## 2023-10-28 NOTE — PROGRESS NOTES
Writer to assume care at 1900. Pt A&Ox4, calm and cooperative.Pt took medications and tolerated care without difficulty. No c/o pain . Pt received scheduled Methadone. Pt  BG was 194. Pt received scheduled insulin, Tresiba.. No sliding scale administered as pt did not meet parameters per MD orders. See MAR.

## 2023-10-28 NOTE — PLAN OF CARE
Problem: Adult Inpatient Plan of Care  Goal: Optimal Comfort and Wellbeing  Outcome: Progressing   Patient alert and oriented x 4. Denies pain overnight. Slept well last night. Left BKA elevated on a pillow. Right leg is warm to touch and reddened. Vitals stable. Will continue to monitor.     Offered prn MOM this AM but patient refused and wants to sleep. Last BM is 10/25/23.    /71 (BP Location: Left arm)   Pulse 95   Temp 98.1  F (36.7  C) (Oral)   Resp 20   Wt 112.9 kg (249 lb)   SpO2 100%   BMI 40.19 kg/m

## 2023-10-29 LAB
GLUCOSE BLDC GLUCOMTR-MCNC: 121 MG/DL (ref 70–99)
GLUCOSE BLDC GLUCOMTR-MCNC: 185 MG/DL (ref 70–99)
GLUCOSE BLDC GLUCOMTR-MCNC: 188 MG/DL (ref 70–99)
GLUCOSE BLDC GLUCOMTR-MCNC: 192 MG/DL (ref 70–99)
INR PPP: 2.78 (ref 0.85–1.15)

## 2023-10-29 PROCEDURE — 120N000017 HC R&B RESPIRATORY CARE

## 2023-10-29 PROCEDURE — 99232 SBSQ HOSP IP/OBS MODERATE 35: CPT | Performed by: STUDENT IN AN ORGANIZED HEALTH CARE EDUCATION/TRAINING PROGRAM

## 2023-10-29 PROCEDURE — 250N000011 HC RX IP 250 OP 636: Performed by: STUDENT IN AN ORGANIZED HEALTH CARE EDUCATION/TRAINING PROGRAM

## 2023-10-29 PROCEDURE — 85610 PROTHROMBIN TIME: CPT | Performed by: STUDENT IN AN ORGANIZED HEALTH CARE EDUCATION/TRAINING PROGRAM

## 2023-10-29 PROCEDURE — 250N000013 HC RX MED GY IP 250 OP 250 PS 637: Performed by: STUDENT IN AN ORGANIZED HEALTH CARE EDUCATION/TRAINING PROGRAM

## 2023-10-29 PROCEDURE — 250N000013 HC RX MED GY IP 250 OP 250 PS 637: Performed by: INTERNAL MEDICINE

## 2023-10-29 RX ORDER — AMOXICILLIN 250 MG
2 CAPSULE ORAL 2 TIMES DAILY PRN
Status: DISCONTINUED | OUTPATIENT
Start: 2023-10-29 | End: 2023-11-06

## 2023-10-29 RX ORDER — WARFARIN SODIUM 2 MG/1
2 TABLET ORAL
Status: COMPLETED | OUTPATIENT
Start: 2023-10-29 | End: 2023-10-29

## 2023-10-29 RX ORDER — AMOXICILLIN 250 MG
2 CAPSULE ORAL 2 TIMES DAILY
Status: DISCONTINUED | OUTPATIENT
Start: 2023-10-29 | End: 2023-11-06

## 2023-10-29 RX ADMIN — INSULIN ASPART 1 UNITS: 100 INJECTION, SOLUTION INTRAVENOUS; SUBCUTANEOUS at 12:27

## 2023-10-29 RX ADMIN — INSULIN DEGLUDEC INJECTION 12 UNITS: 100 INJECTION, SOLUTION SUBCUTANEOUS at 21:16

## 2023-10-29 RX ADMIN — WARFARIN SODIUM 2 MG: 2 TABLET ORAL at 17:30

## 2023-10-29 RX ADMIN — HYDROMORPHONE HYDROCHLORIDE 4 MG: 4 TABLET ORAL at 10:00

## 2023-10-29 RX ADMIN — INSULIN ASPART 2 UNITS: 100 INJECTION, SOLUTION INTRAVENOUS; SUBCUTANEOUS at 17:09

## 2023-10-29 RX ADMIN — ASPIRIN 81 MG: 81 TABLET, COATED ORAL at 08:15

## 2023-10-29 RX ADMIN — MULTIPLE VITAMINS W/ MINERALS TAB 1 TABLET: TAB at 08:15

## 2023-10-29 RX ADMIN — POLYETHYLENE GLYCOL 3350 17 G: 17 POWDER, FOR SOLUTION ORAL at 21:16

## 2023-10-29 RX ADMIN — METHADONE HYDROCHLORIDE 5 MG: 5 TABLET ORAL at 21:12

## 2023-10-29 RX ADMIN — POLYETHYLENE GLYCOL 3350 17 G: 17 POWDER, FOR SOLUTION ORAL at 08:16

## 2023-10-29 RX ADMIN — MICONAZOLE NITRATE: 20 POWDER TOPICAL at 08:16

## 2023-10-29 RX ADMIN — METHADONE HYDROCHLORIDE 5 MG: 5 TABLET ORAL at 08:15

## 2023-10-29 RX ADMIN — SENNOSIDES AND DOCUSATE SODIUM 2 TABLET: 50; 8.6 TABLET ORAL at 08:15

## 2023-10-29 RX ADMIN — MICONAZOLE NITRATE: 20 POWDER TOPICAL at 21:18

## 2023-10-29 RX ADMIN — ATORVASTATIN CALCIUM 40 MG: 40 TABLET, FILM COATED ORAL at 21:33

## 2023-10-29 RX ADMIN — SENNOSIDES AND DOCUSATE SODIUM 2 TABLET: 50; 8.6 TABLET ORAL at 21:12

## 2023-10-29 RX ADMIN — HYDROMORPHONE HYDROCHLORIDE 0.3 MG: 1 INJECTION, SOLUTION INTRAMUSCULAR; INTRAVENOUS; SUBCUTANEOUS at 10:37

## 2023-10-29 RX ADMIN — METHADONE HYDROCHLORIDE 5 MG: 5 TABLET ORAL at 12:30

## 2023-10-29 RX ADMIN — METHADONE HYDROCHLORIDE 10 MG: 10 TABLET ORAL at 15:41

## 2023-10-29 RX ADMIN — TORSEMIDE 15 MG: 10 TABLET ORAL at 08:15

## 2023-10-29 ASSESSMENT — ACTIVITIES OF DAILY LIVING (ADL)
ADLS_ACUITY_SCORE: 45
ADLS_ACUITY_SCORE: 41
ADLS_ACUITY_SCORE: 41
ADLS_ACUITY_SCORE: 45
ADLS_ACUITY_SCORE: 41
ADLS_ACUITY_SCORE: 45
ADLS_ACUITY_SCORE: 41
ADLS_ACUITY_SCORE: 41
ADLS_ACUITY_SCORE: 45
ADLS_ACUITY_SCORE: 41

## 2023-10-29 NOTE — PROGRESS NOTES
[facility]    Medicine Progress Note - Hospitalist Service    Date of Admission:  10/24/2023    Hospital Course  Linda Loredo is a 78y F PMHx IDDM, chronic venous stasis, chronic pain syndrome, afib, HTN, HLD, CKD3, BERLIN, and FTT who presents to [facility] for ongoing wound care and therapies. Pt initially presented 9/23 to [facility] after welfare check by police. She was found to have L leg swelling and edema and was diagnosed with polymicrobial osteomyelitis. She is s/p debridement by podiatry on 9/26. Bcx negative at the time. ALMA with poor flow and pt was transferred to [facility] for vascular surgery evaluation. Pt managed with meropenem and vancomycin at the time for osteo. 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 Vascular surgery was unable to close the stump at the time and elected for ongoing healing prior to surgical closure thus she was discharged to [facility] for ongoing wound care and therapy.      [facility] Course  10/26-10/28: Bowel regimen uptitrated given pt was impacted on arrival and did not completely empty after initial BM. Good BM by end of weekend. RLE with redness below knee, low suspicion of infection, monitoring.       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)  #S/p Tibial/Fibular Resection and Debridement of Stump (10/17)  #Chronic Venous Stasis  - completed course of meropenem/vancomycin 10/8 for osteomyelitis  - vascular surgery planning to close in the future, monthly clinic visits for wound checks with closure at unspecified time in the future  - dilaudid 4mg PO and 0.3mg IV prn pain control (PO 30min prior and IV 15min prior to dressing changes)  - WOC consulted and following  - PT/OT    #Fecal Impaction - resolved  #Constipation - stable  - manual disimpaction (10/25)  - dulcolax 10mg x1, later 20mg x1 (10/25)  - fleet enema (10/25)  -  "doc-senna 2Q BID (with additional 2Q BID prn)  - miralax BID (10/26)  - MoM TID prn  - viscous lidocaine for anal pain  -discussed recommendation for continued increased bowel regimen with pt to clean her out before reducing doses however she does not agree with this plan. Additional regimen made prn.     #Acute on Chronic Pain Syndrome  - methadone 5mg TID and 10mg @ 1500  - Dilaudid IV/PO prn pain and with dressing changes  - previous Psych eval PTA, no associated depression    #IDDM  - pt on degludec 35u qHS, metformin 2000mg daily, ozempic PTA  - continue insulin degludec 12u qHS   - medium ISS  - pt with uptrending BG through day with wave-like pattern  - could consider carb restriction with food or carb based dosing (probably start 1:15) with meals (probably lunch/dinner)    #Afib  #HTN  #HLD  - simvastatin 20mg daily  - warfarin, pharmacy to dose  - continue holding PTA lisinopril 5mg per pt wishes, can restart after discharge    #CKD3  - baseline Cr 1-1.2  - holding lisinopril    #FTT  - nutrition consult  - SW following    #BERLIN  - refusing CPAP            Diet: Consistent Carbohydrate Diet Moderate Consistent Carb (60 g CHO per Meal) Diet  Snacks/Supplements Adult: Glucerna; Between Meals  Snacks/Supplements Adult: Gelatein Plus; With Meals    DVT Prophylaxis: Warfarin  Braga Catheter: Not present  Lines: PRESENT      PICC 10/09/23 Triple Lumen Right Basilic-Site Assessment: WDL      Cardiac Monitoring: None  Code Status: Full Code      Clinically Significant Risk Factors                  # Hypertension: Noted on problem list       # DMII: A1C = 7.0 % (Ref range: <5.7 %) within past 6 months   # Severe Obesity: Estimated body mass index is 40.19 kg/m  as calculated from the following:    Height as of 9/29/23: 1.676 m (5' 6\").    Weight as of this encounter: 112.9 kg (249 lb).             Disposition Plan     Expected Discharge Date: 10/30/2023    Discharge Delays: Complex Discharge  Destination: inpatient " "rehabilitation facility  Discharge Comments: Wound Care-complex. Pain management.            Miguel Shannon MD  Hospitalist Service  [facility]  Securely message with [employer] (more info)  Text page via [employer] Paging/Directory   ______________________________________________________________________    Interval History   - no acute events ovn  - BM y/d, wants to go back to previous bowel regimen  - we discussed that we should try to clean her out before reducing regimen but pt feels she is \"doing just fine\"  - discussed will keep additional regimen on prn and *IF* she gets constipated/impacted again to remember this moment. She laughingly agreed.  - RLE with stable redness, pt using aquaphor and own lotion, no new associated sx today  - no other acute concerns    Physical Exam   Vital Signs: Temp: 97.9  F (36.6  C) Temp src: Oral BP: 127/77 Pulse: 93   Resp: 20 SpO2: 100 % O2 Device: None (Room air)    Weight: 249 lbs 0 oz  Gen: awake, alert, normal appearing, comfortable  HEET: EOMI, MMM  CV: RRR, nl s1s2, no m/r/g  Pulm: CTAB, good respiratory effort, no w/r/r  GI: soft, NT/ND  MSK: L BKA with dressing in place  Integ: see WOC note for full assessment, RLE with erythema over anterolateral shin below the knee down to the foot, no warmth or swelling  Psych: normal mood, normal affect       Medical Decision Making       40 MINUTES SPENT BY ME on the date of service doing chart review, history, exam, documentation & further activities per the note.      Data     I have personally reviewed the following data over the past 24 hrs:    INR:  N/A PTT:  N/A   D-dimer:  N/A Fibrinogen:  N/A       Imaging results reviewed over the past 24 hrs:   No results found for this or any previous visit (from the past 24 hour(s)).  "

## 2023-10-29 NOTE — PLAN OF CARE
Problem: Wound  Goal: Absence of Infection Signs and Symptoms  Outcome: Progressing  Intervention: Prevent or Manage Infection  Recent Flowsheet Documentation  Taken 10/29/2023 0111 by Roma Connor RN  Isolation Precautions: contact precautions maintained  Goal: Optimal Pain Control and Function  Outcome: Progressing     Problem: Anxiety Signs/Symptoms  Goal: Improved Sleep (Anxiety Signs/Symptoms)  Outcome: Progressing     Problem: Pain Acute  Goal: Optimal Pain Control and Function  Outcome: Progressing  Intervention: Prevent or Manage Pain  Recent Flowsheet Documentation  Taken 10/29/2023 0111 by Roma Connor RN  Sensory Stimulation Regulation: care clustered  Medication Review/Management: medications reviewed  Intervention: Optimize Psychosocial Wellbeing  Recent Flowsheet Documentation  Taken 10/29/2023 0111 by Roma Connor RN  Supportive Measures: relaxation techniques promoted   Goal Outcome Evaluation:    Patient appeared to rest well during noc shift, vss, denied pain or discomfort, alert x 4, no prn's adm or requested, repo's self, drsg to wound intact, preventative measures in place and monitored to prevent falls, will continue to monitor.

## 2023-10-29 NOTE — PLAN OF CARE
Problem: Wound  Goal: Optimal Coping  Outcome: Progressing  Intervention: Support Patient and Family Response  Recent Flowsheet Documentation  Taken 10/29/2023 0827 by Ev Lopez RN  Supportive Measures: active listening utilized     Problem: Pain Acute  Goal: Optimal Pain Control and Function  Outcome: Progressing  Intervention: Optimize Psychosocial Wellbeing  Recent Flowsheet Documentation  Taken 10/29/2023 0827 by Ev Lopez RN  Supportive Measures: active listening utilized     Problem: Adult Inpatient Plan of Care  Goal: Absence of Hospital-Acquired Illness or Injury  Intervention: Prevent Skin Injury  Recent Flowsheet Documentation  Taken 10/29/2023 0827 by Ev Lopez RN  Body Position: position changed independently  Intervention: Prevent Infection  Recent Flowsheet Documentation  Taken 10/29/2023 0827 by Ev Lopez RN  Infection Prevention:   hand hygiene promoted   rest/sleep promoted   single patient room provided   equipment surfaces disinfected     Problem: Wound  Goal: Absence of Infection Signs and Symptoms  Intervention: Prevent or Manage Infection  Recent Flowsheet Documentation  Taken 10/29/2023 0827 by Ev Lopez RN  Isolation Precautions: contact precautions maintained  Goal: Skin Health and Integrity  Intervention: Optimize Skin Protection  Recent Flowsheet Documentation  Taken 10/29/2023 0827 by Ev Lopez RN  Head of Bed (HOB) Positioning: HOB at 60-90 degrees     Problem: Anxiety Signs/Symptoms  Goal: Improved Mood Symptoms (Anxiety Signs/Symptoms)  Intervention: Optimize Emotion and Mood  Recent Flowsheet Documentation  Taken 10/29/2023 0827 by Ev Lopez RN  Supportive Measures: active listening utilized   Goal Outcome Evaluation:  Patient pleasant with cares, all daily care needs met. Wound dressing done per orders.  Complained of fingers and left BKA, prn pain medications as well as  schedule medications administered with good effect. Patient declined ordered bowel medications, MD updated and medications were discontinued. Will continue to monitor.

## 2023-10-29 NOTE — PLAN OF CARE
Problem: Adult Inpatient Plan of Care  Goal: Absence of Hospital-Acquired Illness or Injury  Outcome: Progressing  Intervention: Identify and Manage Fall Risk  Recent Flowsheet Documentation  Taken 10/28/2023 1656 by Angie Mcmahan RN  Safety Promotion/Fall Prevention: room door open  Intervention: Prevent Skin Injury  Recent Flowsheet Documentation  Taken 10/28/2023 1656 by Angie Mcmahan RN  Body Position: position changed independently  Intervention: Prevent Infection  Recent Flowsheet Documentation  Taken 10/28/2023 1656 by Angie Mcmahan RN  Infection Prevention: hand hygiene promoted  Goal: Optimal Comfort and Wellbeing  Outcome: Progressing     Problem: Wound  Goal: Optimal Coping  Outcome: Progressing  Intervention: Support Patient and Family Response  Recent Flowsheet Documentation  Taken 10/28/2023 1656 by Angie Mcmahan RN  Supportive Measures: relaxation techniques promoted  Goal: Optimal Functional Ability  Outcome: Progressing  Goal: Absence of Infection Signs and Symptoms  Outcome: Progressing  Intervention: Prevent or Manage Infection  Recent Flowsheet Documentation  Taken 10/28/2023 1656 by Angie Mcmahan RN  Isolation Precautions: contact precautions maintained   Goal Outcome Evaluation:       Pt is A&Ox4 has been pleasant and cooperative. Pt tolerated cares. Pt expressed acceptance of L BKA. Dressing on stump is C/D/I without any visible drainage on the outside of dressing. Pt declined MOM and only wanted partial dose of Senna as she had an extra large soft BM on the previous shift. Pt expressed she intends to speak with MD regarding her scheduled bowel medications. BG levels were: 199, 201 respectively.

## 2023-10-30 ENCOUNTER — APPOINTMENT (OUTPATIENT)
Dept: PHYSICAL THERAPY | Facility: CLINIC | Age: 78
DRG: 560 | End: 2023-10-30
Attending: INTERNAL MEDICINE
Payer: COMMERCIAL

## 2023-10-30 ENCOUNTER — APPOINTMENT (OUTPATIENT)
Dept: OCCUPATIONAL THERAPY | Facility: CLINIC | Age: 78
DRG: 560 | End: 2023-10-30
Attending: INTERNAL MEDICINE
Payer: COMMERCIAL

## 2023-10-30 LAB
GLUCOSE BLDC GLUCOMTR-MCNC: 129 MG/DL (ref 70–99)
GLUCOSE BLDC GLUCOMTR-MCNC: 140 MG/DL (ref 70–99)
GLUCOSE BLDC GLUCOMTR-MCNC: 185 MG/DL (ref 70–99)
GLUCOSE BLDC GLUCOMTR-MCNC: 204 MG/DL (ref 70–99)
INR PPP: 3.19 (ref 0.85–1.15)

## 2023-10-30 PROCEDURE — 99233 SBSQ HOSP IP/OBS HIGH 50: CPT | Performed by: HOSPITALIST

## 2023-10-30 PROCEDURE — 250N000013 HC RX MED GY IP 250 OP 250 PS 637: Performed by: HOSPITALIST

## 2023-10-30 PROCEDURE — 250N000011 HC RX IP 250 OP 636: Performed by: STUDENT IN AN ORGANIZED HEALTH CARE EDUCATION/TRAINING PROGRAM

## 2023-10-30 PROCEDURE — 85610 PROTHROMBIN TIME: CPT | Performed by: STUDENT IN AN ORGANIZED HEALTH CARE EDUCATION/TRAINING PROGRAM

## 2023-10-30 PROCEDURE — 97530 THERAPEUTIC ACTIVITIES: CPT | Mod: GP

## 2023-10-30 PROCEDURE — 250N000013 HC RX MED GY IP 250 OP 250 PS 637: Performed by: STUDENT IN AN ORGANIZED HEALTH CARE EDUCATION/TRAINING PROGRAM

## 2023-10-30 PROCEDURE — 97535 SELF CARE MNGMENT TRAINING: CPT | Mod: GO | Performed by: OCCUPATIONAL THERAPIST

## 2023-10-30 PROCEDURE — 120N000017 HC R&B RESPIRATORY CARE

## 2023-10-30 PROCEDURE — 97542 WHEELCHAIR MNGMENT TRAINING: CPT | Mod: GP

## 2023-10-30 RX ORDER — WARFARIN SODIUM 1 MG/1
1 TABLET ORAL
Status: COMPLETED | OUTPATIENT
Start: 2023-10-30 | End: 2023-10-30

## 2023-10-30 RX ADMIN — MICONAZOLE NITRATE: 20 POWDER TOPICAL at 21:21

## 2023-10-30 RX ADMIN — POLYETHYLENE GLYCOL 3350 17 G: 17 POWDER, FOR SOLUTION ORAL at 08:06

## 2023-10-30 RX ADMIN — ASPIRIN 81 MG: 81 TABLET, COATED ORAL at 08:05

## 2023-10-30 RX ADMIN — METHADONE HYDROCHLORIDE 5 MG: 5 TABLET ORAL at 12:11

## 2023-10-30 RX ADMIN — INSULIN ASPART 1 UNITS: 100 INJECTION, SOLUTION INTRAVENOUS; SUBCUTANEOUS at 12:11

## 2023-10-30 RX ADMIN — METHADONE HYDROCHLORIDE 5 MG: 5 TABLET ORAL at 08:05

## 2023-10-30 RX ADMIN — HYDROMORPHONE HYDROCHLORIDE 0.3 MG: 1 INJECTION, SOLUTION INTRAMUSCULAR; INTRAVENOUS; SUBCUTANEOUS at 11:03

## 2023-10-30 RX ADMIN — MULTIPLE VITAMINS W/ MINERALS TAB 1 TABLET: TAB at 08:05

## 2023-10-30 RX ADMIN — POLYETHYLENE GLYCOL 3350 17 G: 17 POWDER, FOR SOLUTION ORAL at 21:10

## 2023-10-30 RX ADMIN — MICONAZOLE NITRATE: 20 POWDER TOPICAL at 08:06

## 2023-10-30 RX ADMIN — SENNOSIDES AND DOCUSATE SODIUM 2 TABLET: 50; 8.6 TABLET ORAL at 21:10

## 2023-10-30 RX ADMIN — TORSEMIDE 15 MG: 10 TABLET ORAL at 08:05

## 2023-10-30 RX ADMIN — HYDROMORPHONE HYDROCHLORIDE 4 MG: 4 TABLET ORAL at 10:26

## 2023-10-30 RX ADMIN — SENNOSIDES AND DOCUSATE SODIUM 2 TABLET: 50; 8.6 TABLET ORAL at 08:05

## 2023-10-30 RX ADMIN — WARFARIN SODIUM 1 MG: 1 TABLET ORAL at 17:49

## 2023-10-30 RX ADMIN — METHADONE HYDROCHLORIDE 5 MG: 5 TABLET ORAL at 21:10

## 2023-10-30 RX ADMIN — INSULIN DEGLUDEC INJECTION 12 UNITS: 100 INJECTION, SOLUTION SUBCUTANEOUS at 21:19

## 2023-10-30 RX ADMIN — INSULIN ASPART 1 UNITS: 100 INJECTION, SOLUTION INTRAVENOUS; SUBCUTANEOUS at 17:47

## 2023-10-30 RX ADMIN — ATORVASTATIN CALCIUM 40 MG: 40 TABLET, FILM COATED ORAL at 21:08

## 2023-10-30 RX ADMIN — METHADONE HYDROCHLORIDE 10 MG: 10 TABLET ORAL at 14:48

## 2023-10-30 ASSESSMENT — ACTIVITIES OF DAILY LIVING (ADL)
ADLS_ACUITY_SCORE: 41
ADLS_ACUITY_SCORE: 47
ADLS_ACUITY_SCORE: 41
ADLS_ACUITY_SCORE: 47
ADLS_ACUITY_SCORE: 41

## 2023-10-30 NOTE — PROGRESS NOTES
[facility]    Medicine Progress Note - Hospitalist Service    Date of Admission:  10/24/2023    Hospital Course  Linda Loredo is a 78y F PMHx IDDM, chronic venous stasis, chronic pain syndrome, afib, HTN, HLD, CKD3, BERLIN, and FTT who presents to [facility] for ongoing wound care and therapies. Pt initially presented 9/23 to [facility] after welfare check by police. She was found to have L leg swelling and edema and was diagnosed with polymicrobial osteomyelitis. She is s/p debridement by podiatry on 9/26. Bcx negative at the time. AMLA with poor flow and pt was transferred to [facility] for vascular surgery evaluation. Pt managed with meropenem and vancomycin at the time for osteo. 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 Vascular surgery was unable to close the stump at the time and elected for ongoing healing prior to surgical closure thus she was discharged to [facility] for ongoing wound care and therapy.      [facility] Course  10/26-10/30: Bowel regimen uptitrated given pt was impacted on arrival and did not completely empty after initial BM. Good BM by end of weekend. BM 10/29; patient not agreeable to increasing scheduled bowel regimen at this time. RLE with redness below knee, low suspicion of infection, monitoring.     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)  #S/p Tibial/Fibular Resection and Debridement of Stump (10/17)  #Chronic Venous Stasis  - completed course of meropenem/vancomycin 10/8 for osteomyelitis  - vascular surgery planning to close in the future, monthly clinic visits for wound checks with closure at unspecified time in the future  - dilaudid 4mg PO and 0.3mg IV prn pain control (PO 30min prior and IV 15min prior to dressing changes)  - WOC consulted and following  - PT/OT    #Fecal Impaction - resolved  #Constipation - stable  - manual disimpaction  (10/25)  - dulcolax 10mg x1, later 20mg x1 (10/25)  - fleet enema (10/25)  - doc-senna 2Q BID (with additional 2Q BID prn)  - miralax BID (10/26)  - MoM TID prn  - Viscous lidocaine for anal pain  - Discussed recommendation for continued increased bowel regimen with pt to clean her out before reducing doses however she does not agree with this plan. Additional regimen made prn.     #Acute on Chronic Pain Syndrome  - methadone 5mg TID and 10mg @ 1500  - Dilaudid IV/PO prn pain and with dressing changes  - previous Psych eval PTA, no associated depression    #IDDM  - pt on degludec 35u qHS, metformin 2000mg daily, ozempic PTA  - continue insulin degludec 12u qHS   - medium ISS  - pt with uptrending BG through day with wave-like pattern  - could consider carb restriction with food or carb based dosing (probably start 1:15) with meals (probably lunch/dinner). 10/31 - patient does not want to do carb counting. Think it would be beneficial to readdress    #Afib  #HTN  #HLD  - simvastatin 20mg daily  - warfarin, pharmacy to dose  - continue holding PTA lisinopril 5mg per pt wishes, can restart after discharge    #CKD3  - baseline Cr 1-1.2  - holding lisinopril    #FTT  - nutrition consult  - SW following    #BERLIN  - refusing CPAP            Diet: Consistent Carbohydrate Diet Moderate Consistent Carb (60 g CHO per Meal) Diet  Snacks/Supplements Adult: Glucerna; Between Meals  Snacks/Supplements Adult: Gelatein Plus; With Meals    DVT Prophylaxis: Warfarin  Braga Catheter: Not present  Lines: PRESENT      PICC 10/09/23 Triple Lumen Right Basilic-Site Assessment: WDL      Cardiac Monitoring: None  Code Status: Full Code      Clinically Significant Risk Factors                  # Hypertension: Noted on problem list       # DMII: A1C = 7.0 % (Ref range: <5.7 %) within past 6 months              Disposition Plan      Expected Discharge Date: 11/03/2023    Discharge Delays: Complex Discharge  Destination: inpatient rehabilitation  facility  Discharge Comments: Wound Care-complex. Pain management.            Nikki Blanco MD  Hospitalist Service  [facility]  Securely message with [employer] (more info)  Text page via [email or web address] Paging/Directory   ______________________________________________________________________    Interval History   - No acute events overnight   - BM yesterday, soft per patient.   - Patient does not want regular scheduled bowel regimen changed at this time.   - She insists that she does not need additional stool softeners or laxatives to help regulate bowel movements.   - RLE with stable improved      Last 24H PRN:     HYDROmorphone (DILAUDID) tablet 4 mg, 4 mg at 10/30/23 1026    HYDROmorphone (PF) (DILAUDID) injection 0.3 mg, 0.3 mg at 10/30/23 1103 **OR** [DISCONTINUED] HYDROmorphone (PF) (DILAUDID) injection 0.5 mg         Physical Exam   Vital Signs: Temp: 97.9  F (36.6  C) Temp src: Oral BP: 125/67 Pulse: 94   Resp: 20 SpO2: 94 % O2 Device: None (Room air)    Weight: 249 lbs 0 oz  Gen: awake, alert, normal appearing, comfortable  HEET: EOMI, MMM  CV: RRR, nl s1s2, no m/r/g  Pulm: CTAB, good respiratory effort, no w/r/r  GI: soft, NT/ND  MSK: L BKA with dressing in place  Integ: see WOC note for full assessment, RLE with erythema over anterolateral shin below the knee down to the foot improved per patient, no warmth or swelling  Psych: normal mood, normal affect       Medical Decision Making       40 MINUTES SPENT BY ME on the date of service doing chart review, history, exam, documentation & further activities per the note.      Data     I have personally reviewed the following data over the past 24 hrs:    INR:  3.19 (H) PTT:  N/A   D-dimer:  N/A Fibrinogen:  N/A       Imaging results reviewed over the past 24 hrs:   No results found for this or any previous visit (from the past 24 hour(s)).

## 2023-10-30 NOTE — PLAN OF CARE
Problem: Wound  Goal: Optimal Wound Healing  Outcome: Progressing     Problem: Pain Acute  Goal: Optimal Pain Control and Function  Outcome: Progressing     Pt is alert and oriented x 4.  She reports that the pain at left BKA site is always at a 6/10.  Pt is receiving scheduled methadone.  PRN PO dilaudid followed by PRN IV dilaudid given prior to wound dressing change.  Wound dressing change was completed per WOC RN instructions.  Pt denied having any questions or concerns at this time.

## 2023-10-30 NOTE — PLAN OF CARE
Problem: Adult Inpatient Plan of Care  Goal: Absence of Hospital-Acquired Illness or Injury  Outcome: Progressing  Intervention: Identify and Manage Fall Risk  Recent Flowsheet Documentation  Taken 10/30/2023 0143 by Miesha Doe RN  Safety Promotion/Fall Prevention:   room door open   room near nurse's station   activity supervised  Intervention: Prevent Skin Injury  Recent Flowsheet Documentation  Taken 10/30/2023 0143 by Miesha Doe RN  Body Position: position changed independently  Intervention: Prevent Infection  Recent Flowsheet Documentation  Taken 10/30/2023 0143 by Miesha Doe RN  Infection Prevention:   hand hygiene promoted   rest/sleep promoted   single patient room provided   equipment surfaces disinfected     Problem: Wound  Goal: Absence of Infection Signs and Symptoms  Outcome: Progressing  Intervention: Prevent or Manage Infection  Recent Flowsheet Documentation  Taken 10/30/2023 0143 by Miesha Doe RN  Isolation Precautions:   contact precautions maintained   enteric precautions maintained     Problem: Pain Acute  Goal: Optimal Pain Control and Function  Outcome: Progressing  Intervention: Prevent or Manage Pain  Recent Flowsheet Documentation  Taken 10/30/2023 0143 by Miesha Doe RN  Sensory Stimulation Regulation: care clustered  Medication Review/Management: medications reviewed  Intervention: Optimize Psychosocial Wellbeing  Recent Flowsheet Documentation  Taken 10/30/2023 0143 by Miesha Doe RN  Supportive Measures: active listening utilized   Goal Outcome Evaluation:       Pt alert and  oriented. Calm and cooperative with cares.Pt appeared rest well during the night shift.. Vital signs were stable. Np Prn given. Denied any pain. No respiratory distress noted. Left BKA elevated on pillow and right leg is warm to touch and reddened. Will cont to monitor       Miesha Singh RN

## 2023-10-31 ENCOUNTER — APPOINTMENT (OUTPATIENT)
Dept: OCCUPATIONAL THERAPY | Facility: CLINIC | Age: 78
DRG: 560 | End: 2023-10-31
Attending: INTERNAL MEDICINE
Payer: COMMERCIAL

## 2023-10-31 ENCOUNTER — APPOINTMENT (OUTPATIENT)
Dept: PHYSICAL THERAPY | Facility: CLINIC | Age: 78
DRG: 560 | End: 2023-10-31
Attending: INTERNAL MEDICINE
Payer: COMMERCIAL

## 2023-10-31 LAB
GLUCOSE BLDC GLUCOMTR-MCNC: 101 MG/DL (ref 70–99)
GLUCOSE BLDC GLUCOMTR-MCNC: 111 MG/DL (ref 70–99)
GLUCOSE BLDC GLUCOMTR-MCNC: 153 MG/DL (ref 70–99)
GLUCOSE BLDC GLUCOMTR-MCNC: 159 MG/DL (ref 70–99)
INR PPP: 2.65 (ref 0.85–1.15)

## 2023-10-31 PROCEDURE — 250N000013 HC RX MED GY IP 250 OP 250 PS 637: Performed by: HOSPITALIST

## 2023-10-31 PROCEDURE — 85610 PROTHROMBIN TIME: CPT | Performed by: STUDENT IN AN ORGANIZED HEALTH CARE EDUCATION/TRAINING PROGRAM

## 2023-10-31 PROCEDURE — 97530 THERAPEUTIC ACTIVITIES: CPT | Mod: GO | Performed by: OCCUPATIONAL THERAPIST

## 2023-10-31 PROCEDURE — 250N000013 HC RX MED GY IP 250 OP 250 PS 637: Performed by: STUDENT IN AN ORGANIZED HEALTH CARE EDUCATION/TRAINING PROGRAM

## 2023-10-31 PROCEDURE — 120N000017 HC R&B RESPIRATORY CARE

## 2023-10-31 PROCEDURE — 250N000011 HC RX IP 250 OP 636: Performed by: STUDENT IN AN ORGANIZED HEALTH CARE EDUCATION/TRAINING PROGRAM

## 2023-10-31 PROCEDURE — 99232 SBSQ HOSP IP/OBS MODERATE 35: CPT | Performed by: HOSPITALIST

## 2023-10-31 PROCEDURE — 97542 WHEELCHAIR MNGMENT TRAINING: CPT | Mod: GP | Performed by: PHYSICAL THERAPIST

## 2023-10-31 PROCEDURE — 250N000009 HC RX 250: Performed by: HOSPITALIST

## 2023-10-31 PROCEDURE — 97530 THERAPEUTIC ACTIVITIES: CPT | Mod: GP | Performed by: PHYSICAL THERAPIST

## 2023-10-31 RX ORDER — MUPIROCIN 20 MG/G
OINTMENT TOPICAL 2 TIMES DAILY
Status: DISCONTINUED | OUTPATIENT
Start: 2023-10-31 | End: 2023-11-11

## 2023-10-31 RX ADMIN — HYDROMORPHONE HYDROCHLORIDE 0.3 MG: 1 INJECTION, SOLUTION INTRAMUSCULAR; INTRAVENOUS; SUBCUTANEOUS at 11:22

## 2023-10-31 RX ADMIN — METHADONE HYDROCHLORIDE 5 MG: 5 TABLET ORAL at 21:26

## 2023-10-31 RX ADMIN — MUPIROCIN: 20 OINTMENT TOPICAL at 21:27

## 2023-10-31 RX ADMIN — MUPIROCIN: 20 OINTMENT TOPICAL at 12:03

## 2023-10-31 RX ADMIN — METHADONE HYDROCHLORIDE 10 MG: 10 TABLET ORAL at 14:49

## 2023-10-31 RX ADMIN — METHADONE HYDROCHLORIDE 5 MG: 5 TABLET ORAL at 08:33

## 2023-10-31 RX ADMIN — METHADONE HYDROCHLORIDE 5 MG: 5 TABLET ORAL at 12:03

## 2023-10-31 RX ADMIN — INSULIN DEGLUDEC INJECTION 12 UNITS: 100 INJECTION, SOLUTION SUBCUTANEOUS at 21:27

## 2023-10-31 RX ADMIN — INSULIN ASPART 1 UNITS: 100 INJECTION, SOLUTION INTRAVENOUS; SUBCUTANEOUS at 12:03

## 2023-10-31 RX ADMIN — ATORVASTATIN CALCIUM 40 MG: 40 TABLET, FILM COATED ORAL at 21:26

## 2023-10-31 RX ADMIN — HYDROMORPHONE HYDROCHLORIDE 4 MG: 4 TABLET ORAL at 10:37

## 2023-10-31 RX ADMIN — TORSEMIDE 15 MG: 10 TABLET ORAL at 08:37

## 2023-10-31 RX ADMIN — ASPIRIN 81 MG: 81 TABLET, COATED ORAL at 08:33

## 2023-10-31 RX ADMIN — POLYETHYLENE GLYCOL 3350 17 G: 17 POWDER, FOR SOLUTION ORAL at 08:33

## 2023-10-31 RX ADMIN — MICONAZOLE NITRATE: 20 POWDER TOPICAL at 08:34

## 2023-10-31 RX ADMIN — WARFARIN SODIUM 1.5 MG: 3 TABLET ORAL at 17:13

## 2023-10-31 RX ADMIN — POLYETHYLENE GLYCOL 3350 17 G: 17 POWDER, FOR SOLUTION ORAL at 21:26

## 2023-10-31 RX ADMIN — SENNOSIDES AND DOCUSATE SODIUM 2 TABLET: 50; 8.6 TABLET ORAL at 08:33

## 2023-10-31 RX ADMIN — MULTIPLE VITAMINS W/ MINERALS TAB 1 TABLET: TAB at 08:33

## 2023-10-31 RX ADMIN — SENNOSIDES AND DOCUSATE SODIUM 2 TABLET: 50; 8.6 TABLET ORAL at 21:26

## 2023-10-31 ASSESSMENT — ACTIVITIES OF DAILY LIVING (ADL)
ADLS_ACUITY_SCORE: 41

## 2023-10-31 NOTE — PLAN OF CARE
Problem: Wound  Goal: Optimal Wound Healing  Outcome: Progressing     Problem: Pain Acute  Goal: Optimal Pain Control and Function  Outcome: Progressing     Pt is alert and oriented x 4.  She reports that the pain at left BKA site is always at a 6/10.  Pt is receiving scheduled methadone for pain.  PRN PO dilaudid followed by PRN IV dilaudid given prior to wound dressing change.  Wound dressing change was completed per WOC RN instructions.  Pt denied having any questions or concerns at this time.

## 2023-10-31 NOTE — PROGRESS NOTES
[facility]    Medicine Progress Note - Hospitalist Service    Date of Admission:  10/24/2023    Hospital Course  Linda Loredo is a 78y F PMHx IDDM, chronic venous stasis, chronic pain syndrome, afib, HTN, HLD, CKD3, BERLIN, and FTT who presents to [facility] for ongoing wound care and therapies. Pt initially presented 9/23 to [facility] after welfare check by police. She was found to have L leg swelling and edema and was diagnosed with polymicrobial osteomyelitis. She is s/p debridement by podiatry on 9/26. Bcx negative at the time. ALMA with poor flow and pt was transferred to [facility] for vascular surgery evaluation. Pt managed with meropenem and vancomycin at the time for osteo. 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 Vascular surgery was unable to close the stump at the time and elected for ongoing healing prior to surgical closure thus she was discharged to [facility] for ongoing wound care and therapy.      [facility] Course  10/26-10/30: Bowel regimen uptitrated given pt was impacted on arrival and did not completely empty after initial BM. Good BM by end of weekend. BM 10/29; patient not agreeable to increasing scheduled bowel regimen at this time. RLE with redness below knee, low suspicion of infection, monitoring.   10/31.  Progressing well.  Plan for topical Bactroban cream for right lower extremity lesion otherwise no new other changes.    Assessment & Plan    -Continue with current medical management.    Skin lesion, right lower extremity.  Acute.  Bactroban cream, apply twice daily to affected areas.    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)  S/p Tibial/Fibular Resection and Debridement of Stump (10/17)  Chronic Venous Stasis  - completed course of meropenem/vancomycin 10/8 for osteomyelitis  - vascular surgery planning to close in the future, monthly clinic visits for wound checks with  closure at unspecified time in the future  - dilaudid 4mg PO and 0.3mg IV prn pain control (PO 30min prior and IV 15min prior to dressing changes)  - WOC consulted and following  - PT/OT    Fecal Impaction - resolved  Constipation - stable  - manual disimpaction (10/25)  - dulcolax 10mg x1, later 20mg x1 (10/25)  - fleet enema (10/25)  - doc-senna 2Q BID (with additional 2Q BID prn)  - miralax BID (10/26)  - MoM TID prn  - Viscous lidocaine for anal pain      Acute on Chronic Pain Syndrome  - methadone 5mg TID and 10mg @ 1500  - Dilaudid IV/PO prn pain and with dressing changes  - previous Psych eval PTA, no associated depression    IDDM  - pt on degludec 35u qHS, metformin 2000mg daily, ozempic PTA  - continue insulin degludec 12u qHS   - medium ISS  -Monitor blood glucose with Accu-Cheks and adjust insulin accordingly    Afib  HTN  HLD  - simvastatin 20mg daily  - warfarin, pharmacy to dose  - continue holding PTA lisinopril 5mg per pt wishes, can restart after discharge    CKD3  - baseline Cr 1-1.2  - holding lisinopril    FTT  - nutrition consult  - SW following    BERLIN  - refusing CPA      Diet: Consistent Carbohydrate Diet Moderate Consistent Carb (60 g CHO per Meal) Diet  Snacks/Supplements Adult: Glucerna; Between Meals  Snacks/Supplements Adult: Gelatein Plus; With Meals    DVT Prophylaxis: Warfarin  Braga Catheter: Not present  Lines: PRESENT      PICC 10/09/23 Triple Lumen Right Basilic-Site Assessment: WDL      Cardiac Monitoring: None  Code Status: Full Code      Clinically Significant Risk Factors                  # Hypertension: Noted on problem list       # DMII: A1C = 7.0 % (Ref range: <5.7 %) within past 6 months              Disposition Plan     Expected Discharge Date: 11/03/2023    Discharge Delays: Complex Discharge  Destination: inpatient rehabilitation facility  Discharge Comments: Wound Care-complex. Pain management.            Claude Rowe MD  Hospitalist Service  [facility]  Securely message  with Vocera (more info)  Text page via [facility] Paging/Directory   ______________________________________________________________________    Interval History   Patient is in bed comfortably.  She reports she had a good night.  She notes a bullous lesion on her right lower extremity.  She denies any associated pain but states that osteomyelitis that led to her amputation started in a similar fashion.  No new events overnight per RN.      Physical Exam   Vital Signs: Temp: 97.7  F (36.5  C) Temp src: Oral BP: 130/80 Pulse: 88   Resp: 18 SpO2: 98 % O2 Device: None (Room air)    Weight: 249 lbs 0 oz  Gen: She is a morbidly obese female lying in bed comfortably no distress  HEET: Head is normocephalic atraumatic eyes pupils appear equal round and reactive to light  Heart: She has a good S1 and S2 no obvious murmurs, no JVD   Lungs: She has a normal respiratory effort on auscultation good air entry is noted.   Abdomen: Soft nontender nondistended bowel sounds are noted  Musculoskeletal: She has good muscle tone, below the left knee amputation noted  Skin: Erythematous bulla noted on the right lower extremity with surrounding areas of erythema.  Please refer to nursing/wound care note for complete skin exam.      Medical Decision Making       40 MINUTES SPENT BY ME on the date of service doing chart review, history, exam, documentation & further activities per the note.      Data   Lab Results   Component Value Date    WBC 7.3 10/21/2023    WBC 9.4 02/22/2008     Lab Results   Component Value Date    RBC 2.79 10/21/2023    RBC 4.19 02/22/2008     Lab Results   Component Value Date    HGB 8.3 10/21/2023    HGB 12.6 02/22/2008     Lab Results   Component Value Date    HCT 27.2 10/21/2023    HCT 35.6 02/22/2008     Lab Results   Component Value Date    MCV 98 10/21/2023    MCV 85 02/22/2008     Lab Results   Component Value Date    MCH 29.7 10/21/2023    MCH 30.1 02/22/2008     Lab Results   Component Value Date    MCHC 30.5  10/21/2023    MCHC 35.4 02/22/2008     Lab Results   Component Value Date    RDW 14.1 10/21/2023    RDW Test not performed 02/22/2008     Lab Results   Component Value Date     10/21/2023     02/22/2008       Last Comprehensive Metabolic Panel:  Sodium   Date Value Ref Range Status   10/21/2023 142 135 - 145 mmol/L Final     Comment:     Reference intervals for this test were updated on 09/26/2023 to more accurately reflect our healthy population. There may be differences in the flagging of prior results with similar values performed with this method. Interpretation of those prior results can be made in the context of the updated reference intervals.    02/22/2008 140 133 - 144 mmol/L Final     Potassium   Date Value Ref Range Status   10/21/2023 4.2 3.4 - 5.3 mmol/L Final   06/28/2022 4.3 3.5 - 5.0 mmol/L Final   02/22/2008 4.5 3.4 - 5.3 mmol/L Final     Chloride   Date Value Ref Range Status   10/21/2023 102 98 - 107 mmol/L Final   06/28/2022 97 (L) 98 - 107 mmol/L Final   02/22/2008 100 94 - 109 mmol/L Final     Carbon Dioxide   Date Value Ref Range Status   02/22/2008 28 20 - 32 mmol/L Final     Carbon Dioxide (CO2)   Date Value Ref Range Status   10/21/2023 32 (H) 22 - 29 mmol/L Final   06/28/2022 29 22 - 31 mmol/L Final     Anion Gap   Date Value Ref Range Status   10/21/2023 8 7 - 15 mmol/L Final   06/28/2022 13 5 - 18 mmol/L Final   02/22/2008 11 6 - 17 mmol/L Final     Glucose   Date Value Ref Range Status   06/28/2022 73 70 - 125 mg/dL Final   02/22/2008 298 (H) 60 - 99 mg/dL Final     GLUCOSE BY METER POCT   Date Value Ref Range Status   10/31/2023 153 (H) 70 - 99 mg/dL Final     Urea Nitrogen   Date Value Ref Range Status   10/21/2023 20.3 8.0 - 23.0 mg/dL Final   06/28/2022 42 (H) 8 - 28 mg/dL Final   02/22/2008 15 7 - 30 mg/dL Final     Creatinine   Date Value Ref Range Status   10/21/2023 1.02 (H) 0.51 - 0.95 mg/dL Final   02/22/2008 0.82 0.60 - 1.30 mg/dL Final     GFR Estimate   Date  Value Ref Range Status   10/21/2023 56 (L) >60 mL/min/1.73m2 Final   02/22/2008 75 >60 mL/min/1.7m2 Final     Calcium   Date Value Ref Range Status   10/21/2023 9.9 8.8 - 10.2 mg/dL Final   02/22/2008 10.4 8.5 - 10.4 mg/dL Final     Bilirubin Total   Date Value Ref Range Status   09/22/2023 0.6 <=1.2 mg/dL Final   02/22/2008 0.4 0.2 - 1.3 mg/dL Final     Alkaline Phosphatase   Date Value Ref Range Status   09/22/2023 82 35 - 104 U/L Final   02/22/2008 138 40 - 150 U/L Final     ALT   Date Value Ref Range Status   09/22/2023 43 0 - 50 U/L Final     Comment:     Reference intervals for this test were updated on 6/12/2023 to more accurately reflect our healthy population. There may be differences in the flagging of prior results with similar values performed with this method. Interpretation of those prior results can be made in the context of the updated reference intervals.     02/22/2008 28 0 - 50 U/L Final     AST   Date Value Ref Range Status   09/22/2023 90 (H) 0 - 45 U/L Final     Comment:     Reference intervals for this test were updated on 6/12/2023 to more accurately reflect our healthy population. There may be differences in the flagging of prior results with similar values performed with this method. Interpretation of those prior results can be made in the context of the updated reference intervals.   02/22/2008 26 0 - 45 U/L Final           I have personally reviewed the following data over the past 24 hrs:    INR:  2.65 (H) PTT:  N/A   D-dimer:  N/A Fibrinogen:  N/A       Imaging results reviewed over the past 24 hrs:   No results found for this or any previous visit (from the past 24 hour(s)).

## 2023-10-31 NOTE — PLAN OF CARE
Goal Outcome Evaluation:    Problem: Adult Inpatient Plan of Care  Goal: Plan of Care Review  Description: The Plan of Care Review/Shift note should be completed every shift.  The Outcome Evaluation is a brief statement about your assessment that the patient is improving, declining, or no change.  This information will be displayed automatically on your shift  note.  Outcome: Progressing     Problem: Wound  Goal: Optimal Coping  Outcome: Progressing  Intervention: Support Patient and Family Response  Recent Flowsheet Documentation  Taken 10/31/2023 0100 by Bee Dinh RN  Supportive Measures: active listening utilized     Problem: Anxiety Signs/Symptoms  Goal: Optimized Energy Level (Anxiety Signs/Symptoms)  Outcome: Progressing     Problem: Pain Acute  Goal: Optimal Pain Control and Function  Outcome: Progressing  Intervention: Develop Pain Management Plan  Recent Flowsheet Documentation  Taken 10/31/2023 0100 by Bee Dinh RN  Pain Management Interventions: medication offered but refused  Intervention: Prevent or Manage Pain  Recent Flowsheet Documentation  Taken 10/31/2023 0100 by Bee Dinh RN  Sensory Stimulation Regulation: care clustered  Medication Review/Management: medications reviewed  Intervention: Optimize Psychosocial Wellbeing  Recent Flowsheet Documentation  Taken 10/31/2023 0100 by Bee Dinh RN  Supportive Measures: active listening utilized    Pt A/ O X 4, vital signs stable, pt reports general pain, rates pain 6/10, but refuses pain medications, left BKA dressing D/ I, left buttock mepilex dressing changed, used pure wick x 1 for urine,  pt calm and slept most of shift, call light in reach and encourage pt to call with needs.

## 2023-10-31 NOTE — PLAN OF CARE
Problem: Adult Inpatient Plan of Care  Goal: Optimal Comfort and Wellbeing  Outcome: Progressing     Problem: Anxiety Signs/Symptoms  Goal: Optimized Energy Level (Anxiety Signs/Symptoms)  Outcome: Progressing     Problem: Pain Acute  Goal: Optimal Pain Control and Function  Outcome: Progressing  Intervention: Prevent or Manage Pain  Recent Flowsheet Documentation  Taken 10/30/2023 1800 by Madyson Hardwick RN  Medication Review/Management: medications reviewed   Goal Outcome Evaluation:  Pt has complaints of pain 6 of 10.  Pt explained pain is at a 6 of 10 all the time.  Pt declined any prn medications requesting only scheduled medication.  Pt ate 100% of dinner.

## 2023-10-31 NOTE — PROGRESS NOTES
Care Management Follow Up    Length of Stay (days): 7    Expected Discharge Date: 11/03/2023     Concerns to be Addressed:       Patient plan of care discussed at interdisciplinary rounds: Yes    Anticipated Discharge Disposition:  probably TCU; TBD     Anticipated Discharge Services:    Anticipated Discharge DME:      Patient/family educated on Medicare website which has current facility and service quality ratings:    Education Provided on the Discharge Plan:    Patient/Family in Agreement with the Plan:      Referrals Placed by CM/SW:    Private pay costs discussed: Not at this time.    Additional Information:  Did meet with patient briefly this afternoon to introduce CM role and to discuss planning care progression meeting.  Patient reports that her daughter, Lu, is working on packing up her apartment in [place].  Patient plans to find a different living situation, perhaps on the 1st floor, due to her recent BKA.  Patient also said she doesn't want to pay for an apartment that she isn't using at this time.  She did give this writer the OK to reach out to scheduled the CPM with her daughter.  Will continue to follow patient during her [facility] admission.    Steffanie Barrera RN, BSN  Care Coordination  [facility]  [phone]

## 2023-11-01 ENCOUNTER — APPOINTMENT (OUTPATIENT)
Dept: PHYSICAL THERAPY | Facility: CLINIC | Age: 78
DRG: 560 | End: 2023-11-01
Attending: INTERNAL MEDICINE
Payer: COMMERCIAL

## 2023-11-01 LAB
ALBUMIN SERPL BCG-MCNC: 3.2 G/DL (ref 3.5–5.2)
ALP SERPL-CCNC: 97 U/L (ref 35–104)
ALT SERPL W P-5'-P-CCNC: 12 U/L (ref 0–50)
ANION GAP SERPL CALCULATED.3IONS-SCNC: 11 MMOL/L (ref 7–15)
AST SERPL W P-5'-P-CCNC: 19 U/L (ref 0–45)
BASOPHILS # BLD AUTO: 0.1 10E3/UL (ref 0–0.2)
BASOPHILS NFR BLD AUTO: 1 %
BILIRUB SERPL-MCNC: 0.2 MG/DL
BUN SERPL-MCNC: 18 MG/DL (ref 8–23)
CALCIUM SERPL-MCNC: 9.8 MG/DL (ref 8.8–10.2)
CHLORIDE SERPL-SCNC: 100 MMOL/L (ref 98–107)
CREAT SERPL-MCNC: 0.96 MG/DL (ref 0.51–0.95)
DEPRECATED HCO3 PLAS-SCNC: 29 MMOL/L (ref 22–29)
EGFRCR SERPLBLD CKD-EPI 2021: 60 ML/MIN/1.73M2
EOSINOPHIL # BLD AUTO: 0.6 10E3/UL (ref 0–0.7)
EOSINOPHIL NFR BLD AUTO: 10 %
ERYTHROCYTE [DISTWIDTH] IN BLOOD BY AUTOMATED COUNT: 14.6 % (ref 10–15)
GLUCOSE BLDC GLUCOMTR-MCNC: 142 MG/DL (ref 70–99)
GLUCOSE BLDC GLUCOMTR-MCNC: 153 MG/DL (ref 70–99)
GLUCOSE BLDC GLUCOMTR-MCNC: 183 MG/DL (ref 70–99)
GLUCOSE BLDC GLUCOMTR-MCNC: 186 MG/DL (ref 70–99)
GLUCOSE BLDC GLUCOMTR-MCNC: 196 MG/DL (ref 70–99)
GLUCOSE BLDC GLUCOMTR-MCNC: 209 MG/DL (ref 70–99)
GLUCOSE SERPL-MCNC: 149 MG/DL (ref 70–99)
HCT VFR BLD AUTO: 29.3 % (ref 35–47)
HGB BLD-MCNC: 9 G/DL (ref 11.7–15.7)
IMM GRANULOCYTES # BLD: 0 10E3/UL
IMM GRANULOCYTES NFR BLD: 0 %
INR PPP: 2.2 (ref 0.85–1.15)
LYMPHOCYTES # BLD AUTO: 1.9 10E3/UL (ref 0.8–5.3)
LYMPHOCYTES NFR BLD AUTO: 29 %
MCH RBC QN AUTO: 29.6 PG (ref 26.5–33)
MCHC RBC AUTO-ENTMCNC: 30.7 G/DL (ref 31.5–36.5)
MCV RBC AUTO: 96 FL (ref 78–100)
MONOCYTES # BLD AUTO: 0.5 10E3/UL (ref 0–1.3)
MONOCYTES NFR BLD AUTO: 8 %
NEUTROPHILS # BLD AUTO: 3.4 10E3/UL (ref 1.6–8.3)
NEUTROPHILS NFR BLD AUTO: 53 %
PLATELET # BLD AUTO: 268 10E3/UL (ref 150–450)
POTASSIUM SERPL-SCNC: 4.4 MMOL/L (ref 3.4–5.3)
PROT SERPL-MCNC: 6.8 G/DL (ref 6.4–8.3)
RBC # BLD AUTO: 3.04 10E6/UL (ref 3.8–5.2)
SODIUM SERPL-SCNC: 140 MMOL/L (ref 135–145)
WBC # BLD AUTO: 6.4 10E3/UL (ref 4–11)

## 2023-11-01 PROCEDURE — 250N000013 HC RX MED GY IP 250 OP 250 PS 637: Performed by: STUDENT IN AN ORGANIZED HEALTH CARE EDUCATION/TRAINING PROGRAM

## 2023-11-01 PROCEDURE — 99232 SBSQ HOSP IP/OBS MODERATE 35: CPT | Performed by: HOSPITALIST

## 2023-11-01 PROCEDURE — 250N000011 HC RX IP 250 OP 636: Performed by: STUDENT IN AN ORGANIZED HEALTH CARE EDUCATION/TRAINING PROGRAM

## 2023-11-01 PROCEDURE — 85610 PROTHROMBIN TIME: CPT | Performed by: STUDENT IN AN ORGANIZED HEALTH CARE EDUCATION/TRAINING PROGRAM

## 2023-11-01 PROCEDURE — 85025 COMPLETE CBC W/AUTO DIFF WBC: CPT | Performed by: HOSPITALIST

## 2023-11-01 PROCEDURE — 97542 WHEELCHAIR MNGMENT TRAINING: CPT | Mod: GP

## 2023-11-01 PROCEDURE — 120N000017 HC R&B RESPIRATORY CARE

## 2023-11-01 PROCEDURE — 80053 COMPREHEN METABOLIC PANEL: CPT | Performed by: HOSPITALIST

## 2023-11-01 PROCEDURE — 97530 THERAPEUTIC ACTIVITIES: CPT | Mod: GP

## 2023-11-01 PROCEDURE — 250N000013 HC RX MED GY IP 250 OP 250 PS 637: Performed by: HOSPITALIST

## 2023-11-01 RX ADMIN — HYDROMORPHONE HYDROCHLORIDE 4 MG: 4 TABLET ORAL at 11:05

## 2023-11-01 RX ADMIN — METHADONE HYDROCHLORIDE 10 MG: 10 TABLET ORAL at 15:52

## 2023-11-01 RX ADMIN — METHADONE HYDROCHLORIDE 5 MG: 5 TABLET ORAL at 12:56

## 2023-11-01 RX ADMIN — INSULIN DEGLUDEC INJECTION 12 UNITS: 100 INJECTION, SOLUTION SUBCUTANEOUS at 22:18

## 2023-11-01 RX ADMIN — METHADONE HYDROCHLORIDE 5 MG: 5 TABLET ORAL at 09:10

## 2023-11-01 RX ADMIN — MUPIROCIN: 20 OINTMENT TOPICAL at 09:14

## 2023-11-01 RX ADMIN — SENNOSIDES AND DOCUSATE SODIUM 2 TABLET: 50; 8.6 TABLET ORAL at 21:29

## 2023-11-01 RX ADMIN — SENNOSIDES AND DOCUSATE SODIUM 2 TABLET: 50; 8.6 TABLET ORAL at 09:10

## 2023-11-01 RX ADMIN — MICONAZOLE NITRATE: 20 POWDER TOPICAL at 21:32

## 2023-11-01 RX ADMIN — INSULIN ASPART 1 UNITS: 100 INJECTION, SOLUTION INTRAVENOUS; SUBCUTANEOUS at 12:21

## 2023-11-01 RX ADMIN — TORSEMIDE 15 MG: 10 TABLET ORAL at 09:10

## 2023-11-01 RX ADMIN — POLYETHYLENE GLYCOL 3350 17 G: 17 POWDER, FOR SOLUTION ORAL at 09:11

## 2023-11-01 RX ADMIN — ASPIRIN 81 MG: 81 TABLET, COATED ORAL at 09:10

## 2023-11-01 RX ADMIN — METHADONE HYDROCHLORIDE 5 MG: 5 TABLET ORAL at 21:29

## 2023-11-01 RX ADMIN — ATORVASTATIN CALCIUM 40 MG: 40 TABLET, FILM COATED ORAL at 21:29

## 2023-11-01 RX ADMIN — HYDROMORPHONE HYDROCHLORIDE 0.3 MG: 1 INJECTION, SOLUTION INTRAMUSCULAR; INTRAVENOUS; SUBCUTANEOUS at 11:44

## 2023-11-01 RX ADMIN — MULTIPLE VITAMINS W/ MINERALS TAB 1 TABLET: TAB at 09:10

## 2023-11-01 RX ADMIN — MUPIROCIN: 20 OINTMENT TOPICAL at 21:33

## 2023-11-01 RX ADMIN — POLYETHYLENE GLYCOL 3350 17 G: 17 POWDER, FOR SOLUTION ORAL at 21:28

## 2023-11-01 RX ADMIN — INSULIN ASPART 1 UNITS: 100 INJECTION, SOLUTION INTRAVENOUS; SUBCUTANEOUS at 17:36

## 2023-11-01 RX ADMIN — MICONAZOLE NITRATE: 20 POWDER TOPICAL at 09:14

## 2023-11-01 RX ADMIN — WARFARIN SODIUM 1.5 MG: 3 TABLET ORAL at 17:37

## 2023-11-01 ASSESSMENT — ACTIVITIES OF DAILY LIVING (ADL)
ADLS_ACUITY_SCORE: 43
ADLS_ACUITY_SCORE: 41
ADLS_ACUITY_SCORE: 43

## 2023-11-01 NOTE — PROGRESS NOTES
[facility]    Medicine Progress Note - Hospitalist Service    Date of Admission:  10/24/2023    Hospital Course  Linda Loredo is a 78y F PMHx IDDM, chronic venous stasis, chronic pain syndrome, afib, HTN, HLD, CKD3, BERLIN, and FTT who presents to [facility] for ongoing wound care and therapies. Pt initially presented 9/23 to [facility] after welfare check by police. She was found to have L leg swelling and edema and was diagnosed with polymicrobial osteomyelitis. She is s/p debridement by podiatry on 9/26. Bcx negative at the time. ALMA with poor flow and pt was transferred to [facility] for vascular surgery evaluation. Pt managed with meropenem and vancomycin at the time for osteo. 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 Vascular surgery was unable to close the stump at the time and elected for ongoing healing prior to surgical closure thus she was discharged to [facility] for ongoing wound care and therapy.      [facility] Course  10/26-10/30: Bowel regimen uptitrated given pt was impacted on arrival and did not completely empty after initial BM. Good BM by end of weekend. BM 10/29; patient not agreeable to increasing scheduled bowel regimen at this time. RLE with redness below knee, low suspicion of infection, monitoring.   10/31.  Progressing well.  Plan for topical Bactroban cream for right lower extremity lesion otherwise no new other changes.  11/1. Hasn't had a definitive bowel movement, patient refusing suppository. Otherwise, just about the same compared to yesterday    Assessment & Plan    -No new changes at this time    Skin lesion, right lower extremity.  Acute.  Bactroban cream, apply twice daily to affected areas.    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)  S/p Tibial/Fibular Resection and Debridement of Stump (10/17)  Chronic Venous Stasis  - completed course of meropenem/vancomycin 10/8  for osteomyelitis  - vascular surgery planning to close in the future, monthly clinic visits for wound checks with closure at unspecified time in the future  - dilaudid 4mg PO and 0.3mg IV prn pain control (PO 30min prior and IV 15min prior to dressing changes)  - WOC consulted and following  - PT/OT    Fecal Impaction - resolved  Constipation - stable  - manual disimpaction (10/25)  - dulcolax 10mg x1, later 20mg x1 (10/25)  - fleet enema (10/25)  - doc-senna 2Q BID (with additional 2Q BID prn)  - miralax BID (10/26)  - MoM TID prn  - Viscous lidocaine for anal pain      Acute on Chronic Pain Syndrome  - methadone 5mg TID and 10mg @ 1500  - Dilaudid IV/PO prn pain and with dressing changes  - previous Psych eval PTA, no associated depression    IDDM  - pt on degludec 35u qHS, metformin 2000mg daily, ozempic PTA  - continue insulin degludec 12u qHS   - medium ISS  -Monitor blood glucose with Accu-Cheks and adjust insulin accordingly    Afib  HTN  HLD  - simvastatin 20mg daily  - warfarin, pharmacy to dose  - continue holding PTA lisinopril 5mg per pt wishes, can restart after discharge    CKD3  - baseline Cr 1-1.2  - holding lisinopril    FTT  - nutrition consult  - SW following    BERLIN  - refusing CPA      Diet: Consistent Carbohydrate Diet Moderate Consistent Carb (60 g CHO per Meal) Diet  Snacks/Supplements Adult: Glucerna; Between Meals  Snacks/Supplements Adult: Gelatein Plus; With Meals    DVT Prophylaxis: Warfarin  Braga Catheter: Not present  Lines: PRESENT      PICC 10/09/23 Triple Lumen Right Basilic-Site Assessment: WDL      Cardiac Monitoring: None  Code Status: Full Code      Clinically Significant Risk Factors           # Hypercalcemia: corrected calcium is >10.1, will monitor as appropriate    # Hypoalbuminemia: Lowest albumin = 3.2 g/dL at 11/1/2023  6:13 AM, will monitor as appropriate     # Hypertension: Noted on problem list       # DMII: A1C = 7.0 % (Ref range: <5.7 %) within past 6 months               Disposition Plan      Expected Discharge Date: 11/07/2023    Discharge Delays: Complex Discharge  Destination: inpatient rehabilitation facility  Discharge Comments: Wound Care-complex. Pain management.            Claude Rowe MD  Hospitalist Service  [facility]  Securely message with [employer] (more info)  Text page via [employer] Paging/Directory   ______________________________________________________________________    Interval History   No new events overnight per RN. Progressing well with wound care. Has not had a definitive bowel movement but refusing suppository.    Physical Exam   Vital Signs: Temp: 97.9  F (36.6  C) Temp src: Oral BP: (!) 144/68 Pulse: 87   Resp: 20 SpO2: 99 % O2 Device: None (Room air)    Weight: 108 lbs 12.8 oz  Gen: She is a morbidly obese female lying in bed comfortably no distress  HEET: Head is normocephalic atraumatic eyes pupils appear equal round and reactive to light  Heart: She has a good S1 and S2 no obvious murmurs, no JVD   Lungs: She has a normal respiratory effort on auscultation good air entry is noted.   Abdomen: Soft nontender nondistended bowel sounds are noted  Musculoskeletal: She has good muscle tone, below the left knee amputation noted  Skin: Erythematous bulla noted on the right lower extremity with surrounding areas of erythema.  Please refer to nursing/wound care note for complete skin exam.      Medical Decision Making       40 MINUTES SPENT BY ME on the date of service doing chart review, history, exam, documentation & further activities per the note.      Data     Lab Results   Component Value Date    WBC 6.4 11/01/2023    WBC 9.4 02/22/2008     Lab Results   Component Value Date    RBC 3.04 11/01/2023    RBC 4.19 02/22/2008     Lab Results   Component Value Date    HGB 9.0 11/01/2023    HGB 12.6 02/22/2008     Lab Results   Component Value Date    HCT 29.3 11/01/2023    HCT 35.6 02/22/2008     Lab Results   Component Value Date    MCV 96 11/01/2023    MCV 85  02/22/2008     Lab Results   Component Value Date    MCH 29.6 11/01/2023    MCH 30.1 02/22/2008     Lab Results   Component Value Date    MCHC 30.7 11/01/2023    MCHC 35.4 02/22/2008     Lab Results   Component Value Date    RDW 14.6 11/01/2023    RDW Test not performed 02/22/2008     Lab Results   Component Value Date     11/01/2023     02/22/2008       Last Comprehensive Metabolic Panel:  Sodium   Date Value Ref Range Status   11/01/2023 140 135 - 145 mmol/L Final     Comment:     Reference intervals for this test were updated on 09/26/2023 to more accurately reflect our healthy population. There may be differences in the flagging of prior results with similar values performed with this method. Interpretation of those prior results can be made in the context of the updated reference intervals.    02/22/2008 140 133 - 144 mmol/L Final     Potassium   Date Value Ref Range Status   11/01/2023 4.4 3.4 - 5.3 mmol/L Final   06/28/2022 4.3 3.5 - 5.0 mmol/L Final   02/22/2008 4.5 3.4 - 5.3 mmol/L Final     Chloride   Date Value Ref Range Status   11/01/2023 100 98 - 107 mmol/L Final   06/28/2022 97 (L) 98 - 107 mmol/L Final   02/22/2008 100 94 - 109 mmol/L Final     Carbon Dioxide   Date Value Ref Range Status   02/22/2008 28 20 - 32 mmol/L Final     Carbon Dioxide (CO2)   Date Value Ref Range Status   11/01/2023 29 22 - 29 mmol/L Final   06/28/2022 29 22 - 31 mmol/L Final     Anion Gap   Date Value Ref Range Status   11/01/2023 11 7 - 15 mmol/L Final   06/28/2022 13 5 - 18 mmol/L Final   02/22/2008 11 6 - 17 mmol/L Final     Glucose   Date Value Ref Range Status   06/28/2022 73 70 - 125 mg/dL Final   02/22/2008 298 (H) 60 - 99 mg/dL Final     GLUCOSE BY METER POCT   Date Value Ref Range Status   11/01/2023 153 (H) 70 - 99 mg/dL Final     Urea Nitrogen   Date Value Ref Range Status   11/01/2023 18.0 8.0 - 23.0 mg/dL Final   06/28/2022 42 (H) 8 - 28 mg/dL Final   02/22/2008 15 7 - 30 mg/dL Final      Creatinine   Date Value Ref Range Status   11/01/2023 0.96 (H) 0.51 - 0.95 mg/dL Final   02/22/2008 0.82 0.60 - 1.30 mg/dL Final     GFR Estimate   Date Value Ref Range Status   11/01/2023 60 (L) >60 mL/min/1.73m2 Final   02/22/2008 75 >60 mL/min/1.7m2 Final     Calcium   Date Value Ref Range Status   11/01/2023 9.8 8.8 - 10.2 mg/dL Final   02/22/2008 10.4 8.5 - 10.4 mg/dL Final     Bilirubin Total   Date Value Ref Range Status   11/01/2023 0.2 <=1.2 mg/dL Final   02/22/2008 0.4 0.2 - 1.3 mg/dL Final     Alkaline Phosphatase   Date Value Ref Range Status   11/01/2023 97 35 - 104 U/L Final   02/22/2008 138 40 - 150 U/L Final     ALT   Date Value Ref Range Status   11/01/2023 12 0 - 50 U/L Final     Comment:     Reference intervals for this test were updated on 6/12/2023 to more accurately reflect our healthy population. There may be differences in the flagging of prior results with similar values performed with this method. Interpretation of those prior results can be made in the context of the updated reference intervals.     02/22/2008 28 0 - 50 U/L Final     AST   Date Value Ref Range Status   11/01/2023 19 0 - 45 U/L Final     Comment:     Reference intervals for this test were updated on 6/12/2023 to more accurately reflect our healthy population. There may be differences in the flagging of prior results with similar values performed with this method. Interpretation of those prior results can be made in the context of the updated reference intervals.   02/22/2008 26 0 - 45 U/L Final           I have personally reviewed the following data over the past 24 hrs:    6.4  \   9.0 (L)   / 268     140 100 18.0 /  153 (H)   4.4 29 0.96 (H) \     ALT: 12 AST: 19 AP: 97 TBILI: 0.2   ALB: 3.2 (L) TOT PROTEIN: 6.8 LIPASE: N/A     INR:  2.20 (H) PTT:  N/A   D-dimer:  N/A Fibrinogen:  N/A       Imaging results reviewed over the past 24 hrs:   No results found for this or any previous visit (from the past 24 hour(s)).

## 2023-11-01 NOTE — PLAN OF CARE
Goal Outcome Evaluation:    Problem: Adult Inpatient Plan of Care  Goal: Plan of Care Review  Description: The Plan of Care Review/Shift note should be completed every shift.  The Outcome Evaluation is a brief statement about your assessment that the patient is improving, declining, or no change.  This information will be displayed automatically on your shift  note.  Outcome: Progressing     Problem: Wound  Goal: Optimal Coping  Outcome: Progressing  Intervention: Support Patient and Family Response  Recent Flowsheet Documentation  Taken 11/1/2023 0100 by Bee Dinh RN  Supportive Measures: active listening utilized     Problem: Anxiety Signs/Symptoms  Goal: Optimized Energy Level (Anxiety Signs/Symptoms)  Outcome: Progressing     Problem: Pain Acute  Goal: Optimal Pain Control and Function  Outcome: Progressing  Intervention: Develop Pain Management Plan  Recent Flowsheet Documentation  Taken 11/1/2023 0048 by Bee Dinh RN  Pain Management Interventions: medication offered but refused  Intervention: Prevent or Manage Pain  Recent Flowsheet Documentation  Taken 11/1/2023 0100 by Bee Dinh RN  Sensory Stimulation Regulation:   television on   care clustered  Medication Review/Management: medications reviewed  Intervention: Optimize Psychosocial Wellbeing  Recent Flowsheet Documentation  Taken 11/1/2023 0100 by Bee Dinh RN  Supportive Measures: active listening utilized   Pt A/ O X 4, vital signs stable, pt reports general pain, rates pain 6/10, but refuses pain medications, left BKA dressing D/ I, left buttock mepilex dressing D/I,  no void this shift, pt calm and slept most of shift, call light in reach and encourage pt to call with needs.                            numerical 0-10

## 2023-11-01 NOTE — PLAN OF CARE
Problem: Adult Inpatient Plan of Care  Goal: Optimal Comfort and Wellbeing  Outcome: Progressing     Problem: Anxiety Signs/Symptoms  Goal: Improved Mood Symptoms (Anxiety Signs/Symptoms)  Outcome: Progressing  Intervention: Optimize Emotion and Mood  Recent Flowsheet Documentation  Taken 10/31/2023 1630 by Bob Gutierrez RN  Supportive Measures: active listening utilized     Problem: Pain Acute  Goal: Optimal Pain Control and Function  Outcome: Progressing  Intervention: Prevent or Manage Pain  Recent Flowsheet Documentation  Taken 10/31/2023 1630 by Bob Gutierrez RN  Sensory Stimulation Regulation:   television on   care clustered  Medication Review/Management: medications reviewed  Intervention: Optimize Psychosocial Wellbeing  Recent Flowsheet Documentation  Taken 10/31/2023 1630 by Bob Gutierrez RN  Supportive Measures: active listening utilized   Goal Outcome Evaluation:  VSS. Alert and oriented x4. Complains of pain 6/10 and given scheduled pain med. Denies nausea, dizziness, shortness of breath and light headedness. On room air. Adequate intake and output. Continent for bladder and uses pure wick only when voiding. Open wound on buttock and mepilex applied. Left BKA and dressing changed. Uses of call lights appropriately. Mood calm and cooperative.

## 2023-11-01 NOTE — PLAN OF CARE
Problem: Adult Inpatient Plan of Care  Goal: Optimal Comfort and Wellbeing  Outcome: Progressing     Problem: Wound  Goal: Optimal Coping  Outcome: Progressing  Intervention: Support Patient and Family Response  Recent Flowsheet Documentation  Taken 11/1/2023 1200 by Bouchra Montejo RN  Supportive Measures: active listening utilized  Goal: Optimal Pain Control and Function  Outcome: Progressing     Problem: Pain Acute  Goal: Optimal Pain Control and Function  Outcome: Progressing  Intervention: Prevent or Manage Pain  Recent Flowsheet Documentation  Taken 11/1/2023 1200 by Bouchra Montejo RN  Bowel Elimination Promotion: adequate fluid intake promoted  Intervention: Optimize Psychosocial Wellbeing  Recent Flowsheet Documentation  Taken 11/1/2023 1200 by Bouchra Montejo RN  Supportive Measures: active listening utilized   Goal Outcome Evaluation:         Patient is very pleasant. Able to communicate her needs, likes to write down on a paper for reminder. Asked the RN to make sure she got pain medications prior to wound dressing change, given prn hyromorphone in addition to scheduled methadone, had no bowel movement for the past 3 days, taking senna, and miralax. Was also given 120 ml of prune juice. Got  up to chair, and back to bed using the sliding board. Tolerated cares.

## 2023-11-01 NOTE — PLAN OF CARE
Occupational Therapy Discharge Summary    Reason for therapy discharge:    All goals and outcomes met, no further needs identified.    Progress towards therapy goal(s). See goals on Care Plan in [facility] electronic health record for goal details.  Goals met    Therapy recommendation(s):    Continue home exercise program.    Avril Ventura, OTR/L

## 2023-11-01 NOTE — PROGRESS NOTES
Care Management Follow Up    Length of Stay (days): 8    Expected Discharge Date: 11/7/23      Concerns to be Addressed:       Patient plan of care discussed at interdisciplinary rounds: Yes    Anticipated Discharge Disposition:  TCU     Anticipated Discharge Services:  wound cares  Anticipated Discharge DME: JODI,      Patient/family educated on Medicare website which has current facility and service quality ratings:    Education Provided on the Discharge Plan:    Patient/Family in Agreement with the Plan:      Referrals Placed by CM/SW:    Private pay costs discussed: Not applicable    Additional Information:  Did discuss hedy's case in morning rounds.  She is requiring daily wound cares for her L BKA.  Wound will require further surgery to close, but this has not been set up yet. She will have a wheelchair consult on Monday 11/6/23 - she doesn't have a wheelchair of her own currently.  She has done well with OT and has been discharged by them.  Sent out referrals to two skilled nursing facilities near to her hometown.  Will reach out to daughter, Lu, today to set up a care meeting next week.      Addendum:  did reach patient's daughter, Lu, to set up CPM on Monday, 11/6/23 at 2pm.      Steffanie Barrera RN, BSN  Care Coordination  [facility]  [phone]

## 2023-11-02 ENCOUNTER — APPOINTMENT (OUTPATIENT)
Dept: PHYSICAL THERAPY | Facility: CLINIC | Age: 78
DRG: 560 | End: 2023-11-02
Attending: INTERNAL MEDICINE
Payer: COMMERCIAL

## 2023-11-02 LAB
GLUCOSE BLDC GLUCOMTR-MCNC: 144 MG/DL (ref 70–99)
GLUCOSE BLDC GLUCOMTR-MCNC: 202 MG/DL (ref 70–99)
GLUCOSE BLDC GLUCOMTR-MCNC: 203 MG/DL (ref 70–99)
GLUCOSE BLDC GLUCOMTR-MCNC: 240 MG/DL (ref 70–99)
INR PPP: 2 (ref 0.85–1.15)

## 2023-11-02 PROCEDURE — 250N000011 HC RX IP 250 OP 636: Performed by: STUDENT IN AN ORGANIZED HEALTH CARE EDUCATION/TRAINING PROGRAM

## 2023-11-02 PROCEDURE — 85610 PROTHROMBIN TIME: CPT | Performed by: STUDENT IN AN ORGANIZED HEALTH CARE EDUCATION/TRAINING PROGRAM

## 2023-11-02 PROCEDURE — 97542 WHEELCHAIR MNGMENT TRAINING: CPT | Mod: GP

## 2023-11-02 PROCEDURE — 99232 SBSQ HOSP IP/OBS MODERATE 35: CPT | Performed by: HOSPITALIST

## 2023-11-02 PROCEDURE — 250N000013 HC RX MED GY IP 250 OP 250 PS 637: Performed by: STUDENT IN AN ORGANIZED HEALTH CARE EDUCATION/TRAINING PROGRAM

## 2023-11-02 PROCEDURE — 250N000013 HC RX MED GY IP 250 OP 250 PS 637: Performed by: HOSPITALIST

## 2023-11-02 PROCEDURE — 120N000017 HC R&B RESPIRATORY CARE

## 2023-11-02 PROCEDURE — 97530 THERAPEUTIC ACTIVITIES: CPT | Mod: GP

## 2023-11-02 RX ORDER — WARFARIN SODIUM 2 MG/1
2 TABLET ORAL
Status: COMPLETED | OUTPATIENT
Start: 2023-11-02 | End: 2023-11-02

## 2023-11-02 RX ADMIN — INSULIN ASPART 1 UNITS: 100 INJECTION, SOLUTION INTRAVENOUS; SUBCUTANEOUS at 09:19

## 2023-11-02 RX ADMIN — METHADONE HYDROCHLORIDE 5 MG: 5 TABLET ORAL at 22:21

## 2023-11-02 RX ADMIN — INSULIN DEGLUDEC INJECTION 12 UNITS: 100 INJECTION, SOLUTION SUBCUTANEOUS at 22:34

## 2023-11-02 RX ADMIN — MULTIPLE VITAMINS W/ MINERALS TAB 1 TABLET: TAB at 09:21

## 2023-11-02 RX ADMIN — POLYETHYLENE GLYCOL 3350 17 G: 17 POWDER, FOR SOLUTION ORAL at 09:19

## 2023-11-02 RX ADMIN — METHADONE HYDROCHLORIDE 5 MG: 5 TABLET ORAL at 09:21

## 2023-11-02 RX ADMIN — HYDROMORPHONE HYDROCHLORIDE 0.3 MG: 1 INJECTION, SOLUTION INTRAMUSCULAR; INTRAVENOUS; SUBCUTANEOUS at 12:16

## 2023-11-02 RX ADMIN — METHADONE HYDROCHLORIDE 5 MG: 5 TABLET ORAL at 12:43

## 2023-11-02 RX ADMIN — ASPIRIN 81 MG: 81 TABLET, COATED ORAL at 09:21

## 2023-11-02 RX ADMIN — INSULIN ASPART 3 UNITS: 100 INJECTION, SOLUTION INTRAVENOUS; SUBCUTANEOUS at 17:25

## 2023-11-02 RX ADMIN — TORSEMIDE 15 MG: 10 TABLET ORAL at 09:21

## 2023-11-02 RX ADMIN — HYDROMORPHONE HYDROCHLORIDE 4 MG: 4 TABLET ORAL at 11:29

## 2023-11-02 RX ADMIN — INSULIN ASPART 2 UNITS: 100 INJECTION, SOLUTION INTRAVENOUS; SUBCUTANEOUS at 12:41

## 2023-11-02 RX ADMIN — WARFARIN SODIUM 2 MG: 2 TABLET ORAL at 17:25

## 2023-11-02 RX ADMIN — MICONAZOLE NITRATE: 20 POWDER TOPICAL at 09:29

## 2023-11-02 RX ADMIN — SENNOSIDES AND DOCUSATE SODIUM 2 TABLET: 50; 8.6 TABLET ORAL at 09:21

## 2023-11-02 RX ADMIN — SENNOSIDES AND DOCUSATE SODIUM 2 TABLET: 50; 8.6 TABLET ORAL at 22:22

## 2023-11-02 RX ADMIN — MUPIROCIN: 20 OINTMENT TOPICAL at 09:29

## 2023-11-02 RX ADMIN — MUPIROCIN: 20 OINTMENT TOPICAL at 22:38

## 2023-11-02 RX ADMIN — METHADONE HYDROCHLORIDE 10 MG: 10 TABLET ORAL at 15:03

## 2023-11-02 RX ADMIN — ATORVASTATIN CALCIUM 40 MG: 40 TABLET, FILM COATED ORAL at 22:25

## 2023-11-02 ASSESSMENT — ACTIVITIES OF DAILY LIVING (ADL)
ADLS_ACUITY_SCORE: 43
ADLS_ACUITY_SCORE: 45
ADLS_ACUITY_SCORE: 43

## 2023-11-02 NOTE — PROGRESS NOTES
[facility]    Medicine Progress Note - Hospitalist Service    Date of Admission:  10/24/2023    Hospital Course  Linda Loredo is a 78y F PMHx IDDM, chronic venous stasis, chronic pain syndrome, afib, HTN, HLD, CKD3, BERLIN, and FTT who presents to [facility] for ongoing wound care and therapies. Pt initially presented 9/23 to [facility] after welfare check by police. She was found to have L leg swelling and edema and was diagnosed with polymicrobial osteomyelitis. She is s/p debridement by podiatry on 9/26. Bcx negative at the time. ALMA with poor flow and pt was transferred to [facility] for vascular surgery evaluation. Pt managed with meropenem and vancomycin at the time for osteo. 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 Vascular surgery was unable to close the stump at the time and elected for ongoing healing prior to surgical closure thus she was discharged to [facility] for ongoing wound care and therapy.      [facility] Course  10/26-10/30: Bowel regimen uptitrated given pt was impacted on arrival and did not completely empty after initial BM. Good BM by end of weekend. BM 10/29; patient not agreeable to increasing scheduled bowel regimen at this time. RLE with redness below knee, low suspicion of infection, monitoring.   10/31.  Progressing well.  Plan for topical Bactroban cream for right lower extremity lesion otherwise no new other changes.  11/1. Hasn't had a definitive bowel movement, patient refusing suppository. Otherwise, just about the same compared to yesterday  11/2.  Patient reports small bowel movements this morning that is corroborated with RN.  Otherwise she is progressing well with wound care and therapies.  No new changes at this time    Assessment & Plan    -Continue current medical management  Skin lesion, right lower extremity.  Acute.    -Improving  -Bactroban cream, apply twice daily to affected areas.    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/7)  S/p Tibial/Fibular Resection and Debridement of Stump (10/17)  Chronic Venous Stasis  - completed course of meropenem/vancomycin 10/8 for osteomyelitis  - vascular surgery planning to close in the future, monthly clinic visits for wound checks with closure at unspecified time in the future  - dilaudid 4mg PO and 0.3mg IV prn pain control (PO 30min prior and IV 15min prior to dressing changes)  - WOC consulted and following  - PT/OT    Fecal Impaction - resolved  Constipation   - manual disimpaction (10/25)  - dulcolax 10mg x1, later 20mg x1 (10/25)  - fleet enema (10/25)  - doc-senna 2Q BID (with additional 2Q BID prn)  - miralax BID (10/26)  - MoM TID prn  - Viscous lidocaine for anal pain    Acute on Chronic Pain Syndrome  - methadone 5mg TID and 10mg @ 1500  - Dilaudid IV/PO prn pain and with dressing changes  - previous Psych eval PTA, no associated depression    IDDM  - pt on degludec 35u qHS, metformin 2000mg daily, ozempic PTA  - continue insulin degludec 12u qHS   - medium ISS  -Monitor blood glucose with Accu-Cheks and adjust insulin accordingly    Afib  HTN  HLD  - simvastatin 20mg daily  - warfarin, pharmacy to dose  - continue holding PTA lisinopril 5mg per pt wishes, can restart after discharge    CKD3  - baseline Cr 1-1.2  - holding lisinopril    FTT  - nutrition consult  - SW following    BERLIN  - refusing CPA      Diet: Consistent Carbohydrate Diet Moderate Consistent Carb (60 g CHO per Meal) Diet  Snacks/Supplements Adult: Glucerna; Between Meals  Snacks/Supplements Adult: Gelatein Plus; With Meals    DVT Prophylaxis: Warfarin  Braga Catheter: Not present  Lines: PRESENT      PICC 10/09/23 Triple Lumen Right Basilic-Site Assessment: WDL      Cardiac Monitoring: None  Code Status: Full Code      Clinically Significant Risk Factors           # Hypercalcemia: corrected calcium is >10.1, will monitor as appropriate    # Hypoalbuminemia:  Lowest albumin = 3.2 g/dL at 11/1/2023  6:13 AM, will monitor as appropriate     # Hypertension: Noted on problem list       # DMII: A1C = 7.0 % (Ref range: <5.7 %) within past 6 months              Disposition Plan     Expected Discharge Date: 11/07/2023    Discharge Delays: Complex Discharge  Destination: inpatient rehabilitation facility  Discharge Comments: Wound Care-complex. Pain management.            Claude Rowe MD  Hospitalist Service  [facility]  Securely message with [employer] (more info)  Text page via [employer] Paging/Directory   ______________________________________________________________________    Interval History   Patient is in bed comfortably.  She reports having a bowel movement this morning.  States she is doing well.  She reports no new complaints at this time.  No new events overnight per RN    Physical Exam   Vital Signs: Temp: 98.5  F (36.9  C) Temp src: Oral BP: 133/70 Pulse: 75   Resp: 18 SpO2: 99 % O2 Device: None (Room air)    Weight: 143 lbs 4.8 oz  Gen: She is a morbidly obese female lying in bed comfortably no distress  HEET: Head is normocephalic atraumatic eyes pupils appear equal round and reactive to light  Heart: She has a good S1 and S2 no obvious murmurs, no JVD   Lungs: She has a normal respiratory effort on auscultation good air entry is noted.   Abdomen: Soft nontender nondistended bowel sounds are noted  Musculoskeletal: She has good muscle tone, below the left knee amputation noted  Skin: Erythematous bulla noted on the right lower extremity with surrounding areas of erythema.  Please refer to nursing/wound care note for complete skin exam.    Medical Decision Making       40 MINUTES SPENT BY ME on the date of service doing chart review, history, exam, documentation & further activities per the note.      Data     Lab Results   Component Value Date    WBC 6.4 11/01/2023    WBC 9.4 02/22/2008     Lab Results   Component Value Date    RBC 3.04 11/01/2023    RBC 4.19 02/22/2008      Lab Results   Component Value Date    HGB 9.0 11/01/2023    HGB 12.6 02/22/2008     Lab Results   Component Value Date    HCT 29.3 11/01/2023    HCT 35.6 02/22/2008     Lab Results   Component Value Date    MCV 96 11/01/2023    MCV 85 02/22/2008     Lab Results   Component Value Date    MCH 29.6 11/01/2023    MCH 30.1 02/22/2008     Lab Results   Component Value Date    MCHC 30.7 11/01/2023    MCHC 35.4 02/22/2008     Lab Results   Component Value Date    RDW 14.6 11/01/2023    RDW Test not performed 02/22/2008     Lab Results   Component Value Date     11/01/2023     02/22/2008       Last Comprehensive Metabolic Panel:  Sodium   Date Value Ref Range Status   11/01/2023 140 135 - 145 mmol/L Final     Comment:     Reference intervals for this test were updated on 09/26/2023 to more accurately reflect our healthy population. There may be differences in the flagging of prior results with similar values performed with this method. Interpretation of those prior results can be made in the context of the updated reference intervals.    02/22/2008 140 133 - 144 mmol/L Final     Potassium   Date Value Ref Range Status   11/01/2023 4.4 3.4 - 5.3 mmol/L Final   06/28/2022 4.3 3.5 - 5.0 mmol/L Final   02/22/2008 4.5 3.4 - 5.3 mmol/L Final     Chloride   Date Value Ref Range Status   11/01/2023 100 98 - 107 mmol/L Final   06/28/2022 97 (L) 98 - 107 mmol/L Final   02/22/2008 100 94 - 109 mmol/L Final     Carbon Dioxide   Date Value Ref Range Status   02/22/2008 28 20 - 32 mmol/L Final     Carbon Dioxide (CO2)   Date Value Ref Range Status   11/01/2023 29 22 - 29 mmol/L Final   06/28/2022 29 22 - 31 mmol/L Final     Anion Gap   Date Value Ref Range Status   11/01/2023 11 7 - 15 mmol/L Final   06/28/2022 13 5 - 18 mmol/L Final   02/22/2008 11 6 - 17 mmol/L Final     Glucose   Date Value Ref Range Status   06/28/2022 73 70 - 125 mg/dL Final   02/22/2008 298 (H) 60 - 99 mg/dL Final     GLUCOSE BY METER POCT   Date  Value Ref Range Status   11/02/2023 144 (H) 70 - 99 mg/dL Final     Urea Nitrogen   Date Value Ref Range Status   11/01/2023 18.0 8.0 - 23.0 mg/dL Final   06/28/2022 42 (H) 8 - 28 mg/dL Final   02/22/2008 15 7 - 30 mg/dL Final     Creatinine   Date Value Ref Range Status   11/01/2023 0.96 (H) 0.51 - 0.95 mg/dL Final   02/22/2008 0.82 0.60 - 1.30 mg/dL Final     GFR Estimate   Date Value Ref Range Status   11/01/2023 60 (L) >60 mL/min/1.73m2 Final   02/22/2008 75 >60 mL/min/1.7m2 Final     Calcium   Date Value Ref Range Status   11/01/2023 9.8 8.8 - 10.2 mg/dL Final   02/22/2008 10.4 8.5 - 10.4 mg/dL Final     Bilirubin Total   Date Value Ref Range Status   11/01/2023 0.2 <=1.2 mg/dL Final   02/22/2008 0.4 0.2 - 1.3 mg/dL Final     Alkaline Phosphatase   Date Value Ref Range Status   11/01/2023 97 35 - 104 U/L Final   02/22/2008 138 40 - 150 U/L Final     ALT   Date Value Ref Range Status   11/01/2023 12 0 - 50 U/L Final     Comment:     Reference intervals for this test were updated on 6/12/2023 to more accurately reflect our healthy population. There may be differences in the flagging of prior results with similar values performed with this method. Interpretation of those prior results can be made in the context of the updated reference intervals.     02/22/2008 28 0 - 50 U/L Final     AST   Date Value Ref Range Status   11/01/2023 19 0 - 45 U/L Final     Comment:     Reference intervals for this test were updated on 6/12/2023 to more accurately reflect our healthy population. There may be differences in the flagging of prior results with similar values performed with this method. Interpretation of those prior results can be made in the context of the updated reference intervals.   02/22/2008 26 0 - 45 U/L Final           I have personally reviewed the following data over the past 24 hrs:    INR:  2.00 (H) PTT:  N/A   D-dimer:  N/A Fibrinogen:  N/A       Imaging results reviewed over the past 24 hrs:   No results  found for this or any previous visit (from the past 24 hour(s)).

## 2023-11-02 NOTE — PROGRESS NOTES
[facility] ([facility])  SPIRITUAL ASSESSMENT Progress Note  [facility]. Unit [facility]     REFERRAL SOURCE: LOS     I attempted a followup visit with Pat multiple times today but each time she was busy with cares.      PLAN: I will plan to meet with Pat, as able, the next time I'm on site.      Jeanne Solares, Ph.D., [facility]      [facility] available 24/7 for emergency requests/referrals, either by having the on-call  paged or by entering an ASAP/STAT consult in Epic (this will also page the on-call ).

## 2023-11-02 NOTE — PLAN OF CARE
Problem: Adult Inpatient Plan of Care  Goal: Plan of Care Review  Description: The Plan of Care Review/Shift note should be completed every shift.  The Outcome Evaluation is a brief statement about your assessment that the patient is improving, declining, or no change.  This information will be displayed automatically on your shift  note.  Outcome: Progressing   Goal Outcome Evaluation:        Pt's BS last km was 196. Pt expressed  concern that her BS was high & states she usually doesn't run this high. Writer rechecks pt's BS 45 minutes later for 186. Pt is questioning if the banana she ate for dinner had something to do with increased BS reading. Fluid filled blister remains intact over (R) shin with surrounding erythema. Admits to numbness in all extremities, which she states is neuropathic & chronic. Slept soundly t/o the night. Awakens this am & asks for PureWick to void. INR drawn from PICC this am. Pt concerned that rash on (L) f/a is spreading. She reports newly noted rash this am on (R) inner f/a. She appears to have sensitive skin. Possibly reacting to daily CHG wipes that are used on her skin. Msg left for the MD.

## 2023-11-03 ENCOUNTER — APPOINTMENT (OUTPATIENT)
Dept: PHYSICAL THERAPY | Facility: CLINIC | Age: 78
DRG: 560 | End: 2023-11-03
Attending: INTERNAL MEDICINE
Payer: COMMERCIAL

## 2023-11-03 LAB
GLUCOSE BLDC GLUCOMTR-MCNC: 142 MG/DL (ref 70–99)
GLUCOSE BLDC GLUCOMTR-MCNC: 161 MG/DL (ref 70–99)
GLUCOSE BLDC GLUCOMTR-MCNC: 165 MG/DL (ref 70–99)
GLUCOSE BLDC GLUCOMTR-MCNC: 210 MG/DL (ref 70–99)
INR PPP: 1.91 (ref 0.85–1.15)

## 2023-11-03 PROCEDURE — 85610 PROTHROMBIN TIME: CPT | Performed by: STUDENT IN AN ORGANIZED HEALTH CARE EDUCATION/TRAINING PROGRAM

## 2023-11-03 PROCEDURE — 120N000017 HC R&B RESPIRATORY CARE

## 2023-11-03 PROCEDURE — 250N000011 HC RX IP 250 OP 636: Performed by: STUDENT IN AN ORGANIZED HEALTH CARE EDUCATION/TRAINING PROGRAM

## 2023-11-03 PROCEDURE — G0463 HOSPITAL OUTPT CLINIC VISIT: HCPCS

## 2023-11-03 PROCEDURE — 97530 THERAPEUTIC ACTIVITIES: CPT | Mod: GP

## 2023-11-03 PROCEDURE — 250N000013 HC RX MED GY IP 250 OP 250 PS 637: Performed by: HOSPITALIST

## 2023-11-03 PROCEDURE — 99232 SBSQ HOSP IP/OBS MODERATE 35: CPT | Performed by: HOSPITALIST

## 2023-11-03 PROCEDURE — 97542 WHEELCHAIR MNGMENT TRAINING: CPT | Mod: GP

## 2023-11-03 PROCEDURE — 250N000013 HC RX MED GY IP 250 OP 250 PS 637: Performed by: STUDENT IN AN ORGANIZED HEALTH CARE EDUCATION/TRAINING PROGRAM

## 2023-11-03 RX ORDER — WARFARIN SODIUM 2 MG/1
2 TABLET ORAL
Status: DISCONTINUED | OUTPATIENT
Start: 2023-11-03 | End: 2023-11-03

## 2023-11-03 RX ADMIN — INSULIN ASPART 1 UNITS: 100 INJECTION, SOLUTION INTRAVENOUS; SUBCUTANEOUS at 12:18

## 2023-11-03 RX ADMIN — METHADONE HYDROCHLORIDE 10 MG: 10 TABLET ORAL at 16:04

## 2023-11-03 RX ADMIN — ASPIRIN 81 MG: 81 TABLET, COATED ORAL at 08:21

## 2023-11-03 RX ADMIN — MUPIROCIN: 20 OINTMENT TOPICAL at 21:31

## 2023-11-03 RX ADMIN — ATORVASTATIN CALCIUM 40 MG: 40 TABLET, FILM COATED ORAL at 21:24

## 2023-11-03 RX ADMIN — METHADONE HYDROCHLORIDE 5 MG: 5 TABLET ORAL at 21:25

## 2023-11-03 RX ADMIN — INSULIN DEGLUDEC INJECTION 12 UNITS: 100 INJECTION, SOLUTION SUBCUTANEOUS at 21:27

## 2023-11-03 RX ADMIN — METHADONE HYDROCHLORIDE 5 MG: 5 TABLET ORAL at 13:04

## 2023-11-03 RX ADMIN — HYDROMORPHONE HYDROCHLORIDE 4 MG: 4 TABLET ORAL at 10:36

## 2023-11-03 RX ADMIN — MICONAZOLE NITRATE: 20 POWDER TOPICAL at 21:31

## 2023-11-03 RX ADMIN — HYDROMORPHONE HYDROCHLORIDE 0.3 MG: 1 INJECTION, SOLUTION INTRAMUSCULAR; INTRAVENOUS; SUBCUTANEOUS at 11:06

## 2023-11-03 RX ADMIN — MUPIROCIN: 20 OINTMENT TOPICAL at 08:31

## 2023-11-03 RX ADMIN — MULTIPLE VITAMINS W/ MINERALS TAB 1 TABLET: TAB at 08:21

## 2023-11-03 RX ADMIN — SENNOSIDES AND DOCUSATE SODIUM 2 TABLET: 50; 8.6 TABLET ORAL at 08:21

## 2023-11-03 RX ADMIN — INSULIN ASPART 1 UNITS: 100 INJECTION, SOLUTION INTRAVENOUS; SUBCUTANEOUS at 08:19

## 2023-11-03 RX ADMIN — TORSEMIDE 15 MG: 10 TABLET ORAL at 08:21

## 2023-11-03 RX ADMIN — INSULIN ASPART 1 UNITS: 100 INJECTION, SOLUTION INTRAVENOUS; SUBCUTANEOUS at 17:29

## 2023-11-03 RX ADMIN — WARFARIN SODIUM 1.5 MG: 3 TABLET ORAL at 17:30

## 2023-11-03 RX ADMIN — SENNOSIDES AND DOCUSATE SODIUM 2 TABLET: 50; 8.6 TABLET ORAL at 21:25

## 2023-11-03 RX ADMIN — METHADONE HYDROCHLORIDE 5 MG: 5 TABLET ORAL at 08:22

## 2023-11-03 ASSESSMENT — ACTIVITIES OF DAILY LIVING (ADL)
ADLS_ACUITY_SCORE: 45

## 2023-11-03 NOTE — PLAN OF CARE
Problem: Adult Inpatient Plan of Care  Goal: Optimal Comfort and Wellbeing  Outcome: Progressing     Problem: Wound  Goal: Optimal Coping  Outcome: Progressing  Intervention: Support Patient and Family Response  Recent Flowsheet Documentation  Taken 11/2/2023 1600 by Bouchra Montejo RN  Supportive Measures: active listening utilized  Taken 11/2/2023 0930 by Bouchra Montejo RN  Supportive Measures: active listening utilized     Problem: Pain Acute  Goal: Optimal Pain Control and Function  Outcome: Progressing  Intervention: Prevent or Manage Pain  Recent Flowsheet Documentation  Taken 11/2/2023 1600 by Bouchra Montejo RN  Bowel Elimination Promotion: adequate fluid intake promoted  Taken 11/2/2023 0930 by Bouchra Montejo RN  Bowel Elimination Promotion: adequate fluid intake promoted  Intervention: Optimize Psychosocial Wellbeing  Recent Flowsheet Documentation  Taken 11/2/2023 1600 by Bouchra Montejo RN  Supportive Measures: active listening utilized  Taken 11/2/2023 0930 by Bouchra Montejo RN  Supportive Measures: active listening utilized   Goal Outcome Evaluation:       Patient has a rash over part of her  arms, and the back side of the left BKA, her skin may be sensitive to chlorhexidine wipes that were used for bed bath, MD updated, note on the door to avoid using the CHG wipes on patient. Also wound nurse was informed, and will reevaluate on 11/03/23   ATUL wound dressing had to be changed twice due to falling off during cares. Pain medications were given, and helpful, patient tolerated cares.

## 2023-11-03 NOTE — PROGRESS NOTES
HEMODIALYSIS TREATMENT NOTE    Date: 10/27/2020  Time: 11:39 AM    Data:  Pre Wt: 17.3 kg (38 lb 2.2 oz)   Desired Wt: 16.5 kg   Post Wt: 17.3 kg (38 lb 2.2 oz)  Weight change: 0 kg  Ultrafiltration - Post Run Net Total Removed (mL): 0 mL  Vascular Access Status: CVC  patent  Dialyzer Rinse: Streaked, Light  Total Blood Volume Processed: 17.85 L   Total Dialysis (Treatment) Time: 3 hours   Dialysate Bath: K 3, Ca 3  Heparin 500 units loading + 500 units/hr    Lab:   Sodium 130 (L)   Potassium 4.5   Chloride 89 (L)   Carbon Dioxide 30   Urea Nitrogen 78 (H)   Creatinine 5.05 (H)   Calcium 9.9   Anion Gap 11   Phosphorus 2.6 (L)   Albumin 4.4   Glucose 89       Interventions/Assessment:  Net UF set for 800 mL per orders. Loco in crit-line, decrease in BP, and small emesis 30 minutes into HD treatment. UF goal matched, 30 mL NS given. Symptoms subsided following intervention. Nephrologist informed of emesis, decrease in BP, and Sodium 130. Verbal order given to decrease to 3 hours if not tolerated and change to 138 dialysate. SBP in the 80's during treatment. Cramping LE. Net UF goal of 0 mL and treatment decreased to 3 hours. Switched to K3 bath per order following potassium results. Dressing reinforced. [facility]. Hand off report given to Angely Lozano 5 RN.     Plan:    Next HD treatment Wednesday 10/28/20     [facility]  WO Nurse Inpatient Assessment     Consulted for: Left BKA    Patient History (according to provider note(s):      Per H+P:   78y F PMHx IDDM, chronic venous stasis, chronic pain syndrome, afib, HTN, HLD, CKD3, BERLIN, and FTT who presents to [facility] for ongoing wound care and therapies. Pt initially presented 9/23 to [facility] after welfare check by police. She was found to have L leg swelling and edema and was diagnosed with polymicrobial osteomyelitis. She is s/p debridement by podiatry on 9/26. Bcx negative at the time. ALMA with poor flow and pt was transferred to [facility] for vascular surgery evaluation. Pt managed with meropenem and vancomycin at the time for osteo. 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 Vascular surgery was unable to close the stump at the time and elected for ongoing healing prior to surgical closure thus she was discharged to [facility] for ongoing wound care and therapy.      Assessment:      Areas visualized during today's visit: Lower extremities     Wound location: Left buttock fold and skin folds  Wound due to: Friction and Incontinence Associated Dermatitis (IAD)  Wound history/plan of care: Patient with history of wounds and IAD  Wound base: Left buttock fold dermis - buttocks and perineal Scattered erythema but no rash or other open areas at this time  Skin folds - bilateral breasts, pannus and groin, posterior knee and thigh folds denudement - Interdry in place but not long enough for wicking. Reviewed proper use with patient and nurse, will order new Interdry     Palpation of the wound bed: normal      Drainage: scant      Description of drainage: serosanguinous     Measurements (length x width x depth, in cm): 3 x 4 x 0.2cm     Tunneling: N/A     Undermining: N/A  Periwound skin: Intact      Color: pink      Temperature: normal   Odor: none  Pain: denies   Treatment goal: Heal  and  Protection  STATUS: stable   Supplies ordered: supplies stored on unit       Wound location: Left BKA   Last photo: 11/3     Previous facility      10/26      11/3    Wound due to: surgical  Wound history/plan of care: s/p adriel CRUZnaomy 10/7  Wound base:  Granulation and adipose, tissue quality improved  ~Left lateral thigh wounds are healed, new pink skin     Palpation of the wound bed: soft, boggy     Drainage: moderate     Description of drainage: serosanguinous     Measurements (length x width x depth, in cm): 17  x 17cm, raised in center but edges flush     Tunneling: N/A     Undermining: N/A  Periwound skin: Edematous and intact      Color: red/pink        Temperature: normal   Odor: moderate  Pain: mild to moderate during dressing change  Pain interventions prior to dressing change: oral dilaudid, IV dilaudid, slow and gentle cares   Treatment goal: Drainage control, Infection control/prevention, Increase granulation, Protection, and Prepare wound bed for flap/graft  STATUS: iimproving  Supplies ordered: stored on unit      Wound location: Plantar right foot    Last photo: 10/26  Wound due to:  Diabetic versus pressure  Wound history/plan of care: Patient states she has had this a long time   Wound base: discoloration under callus     Palpation of the wound bed: normal      Drainage: none     Description of drainage: none     Measurements (length x width x depth, in cm): 4  x 4.5 cm      Tunneling: N/A     Undermining: N/A  Periwound skin: Intact      Color: normal and consistent with surrounding tissue      Temperature: normal   Odor: none  Pain: denies   Treatment goal: Maintain (prevention of deterioration)  STATUS: stable  Patient also has dry patch/callus/eschar on left palm of hand - will monitor      Treatment Plan:     Left BKA: PLEASE READ ALL STEPS PRIOR TO STARTING (and administer pain meds per orders):  1. Apply Vashe soaked gauze to skin, including sreekanth wound skin, let soak for 5 min  2. Rinse  with saline and pat dry (MUST DO THIS - Vashe is NOT compatible with Hydrofera  3. Apply skin prep to sreekanth wound skin  4. Cut Hydrofera Blue in half and soak with saline and place over wound bed - use both halves but do not place it a one piece to reduce pressure  5. cover with abd, and wrap with kerlix to secure  Change daily    Left buttock fold:  1. Cleanse with bath wipes, including sreekanth wound skin, gently pat dry  2. Apply Mepilex  Change every other day    Daily:  Interdry(order#[id number]):   1.  Wash skin gently. Pat, do not rub.  2.  With clean scissors, cut enough fabric to cover the affected area, allowing for a minimum of 2 inches to extend beyond the skin fold for moisture evaporation.  3.  Lay a single layer of fabric in the skin fold, placing one edge into the base of the fold. Gently smooth the rest of the fabric over the skin, keeping it flat.  4.  Leave at least 2 inches of fabric exposed outside the skin fold.  5.  Secure the fabric in one of several ways: with the skin fold, with a small amount of skin-friendly tape, or tucked under clothing.  6.  Remove the fabric before bathing and reuse it when finished. When removing, gently separate the skin fold and lift away.  Helpful Hints  1. InterDry  can be written on with a ballpoint pen. It may be helpful to label each piece of InterDry  with the date you started using it.  2.  Each piece of InterDry  may be used up to 5 days, depending on fabric soiling, odor, amount of moisture and general skin condition. Replace InterDry  if it becomes soiled with blood, urine or stool.  3.  Do not use creams, ointments, or powders with InterDry  as it may interfere with product efficacy.      Orders: Reviewed    RECOMMEND PRIMARY TEAM ORDER: None, at this time  Education provided: plan of care, wound progress, Moisture management, Hygiene, and Off-loading pressure  Discussed plan of care with: Patient and Nurse   WOC nurse follow-up plan: weekly   Notify WOC if  wound(s) deteriorate.  Nursing to notify the Provider(s) and re-consult the [facility] Nurse if new skin concern.    DATA:     Current support surface: Standard  Low air loss (BRISA pump, Isolibrium, Pulsate, skin guard, etc)  Containment of urine/stool: Incontinence Protocol, Incontinent pad in bed, and Purewick external catheter   BMI: Body mass index is 39.77 kg/m .   Active diet order: Orders Placed This Encounter      Consistent Carbohydrate Diet Moderate Consistent Carb (60 g CHO per Meal) Diet     Output: I/O last 3 completed shifts:  In: 180 [P.O.:120; I.V.:60]  Out: 1950 [Urine:1950]     Labs:   Recent Labs   Lab 11/03/23  0558 11/02/23  0624 11/01/23  0613   ALBUMIN  --   --  3.2*   HGB  --   --  9.0*   INR 1.91*   < > 2.20*   WBC  --   --  6.4    < > = values in this interval not displayed.     Pressure injury risk assessment:   Sensory Perception: 3-->slightly limited  Moisture: 3-->occasionally moist  Activity: 2-->chairfast  Mobility: 3-->slightly limited  Nutrition: 3-->adequate  Friction and Shear: 2-->potential problem  Suresh Score: 16    Mariana Thompson, JOHANN, RN, PHN, HNB-BC, CWOCN  Pager no longer is use, please contact through [employer] group: [facility] [employer]

## 2023-11-03 NOTE — PROGRESS NOTES
[facility]    Medicine Progress Note - Hospitalist Service    Date of Admission:  10/24/2023    Hospital Course  Linda Loredo is a 78y F PMHx IDDM, chronic venous stasis, chronic pain syndrome, afib, HTN, HLD, CKD3, BERLIN, and FTT who presents to [facility] for ongoing wound care and therapies. Pt initially presented 9/23 to [facility] after welfare check by police. She was found to have L leg swelling and edema and was diagnosed with polymicrobial osteomyelitis. She is s/p debridement by podiatry on 9/26. Bcx negative at the time. ALMA with poor flow and pt was transferred to [facility] for vascular surgery evaluation. Pt managed with meropenem and vancomycin at the time for osteo. 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 Vascular surgery was unable to close the stump at the time and elected for ongoing healing prior to surgical closure thus she was discharged to [facility] for ongoing wound care and therapy.      [facility] Course  10/26-10/30: Bowel regimen uptitrated given pt was impacted on arrival and did not completely empty after initial BM. Good BM by end of weekend. BM 10/29; patient not agreeable to increasing scheduled bowel regimen at this time. RLE with redness below knee, low suspicion of infection, monitoring.   10/31.  Progressing well.  Plan for topical Bactroban cream for right lower extremity lesion otherwise no new other changes.  11/1. Hasn't had a definitive bowel movement, patient refusing suppository. Otherwise, just about the same compared to yesterday  11/2.  Patient reports small bowel movements this morning that is corroborated with RN.  Otherwise she is progressing well with wound care and therapies.  No new changes at this time  11/3.  Doing well with wound care.  Skin lesion healing and patient reports bowel movement.  Continue current medical management.    Assessment & Plan    - No new changes at this time    Skin lesion, right lower  extremity.  Acute.    -Improving  -Bactroban cream, apply twice daily to affected areas.    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)  S/p Tibial/Fibular Resection and Debridement of Stump (10/17)  Chronic Venous Stasis  - Completed course of meropenem/vancomycin 10/8 for osteomyelitis  - Vascular surgery planning to close in the future, monthly clinic visits for wound checks with closure at unspecified time in the future  - Dilaudid 4mg PO and 0.3mg IV prn pain control (PO 30min prior and IV 15min prior to dressing changes)  - WOC consulted and following  - PT/OT    Fecal Impaction - resolved  Constipation   - manual disimpaction (10/25)  - dulcolax 10mg x1, later 20mg x1 (10/25)  - fleet enema (10/25)  - doc-senna 2Q BID (with additional 2Q BID prn)  - miralax BID (10/26)  - MoM TID prn  - Viscous lidocaine for anal pain    Acute on Chronic Pain Syndrome  - methadone 5mg TID and 10mg @ 1500  - Dilaudid IV/PO prn pain and with dressing changes  - previous Psych eval PTA, no associated depression    IDDM  - pt on degludec 35u qHS, metformin 2000mg daily, ozempic PTA  - continue insulin degludec 12u qHS   - medium ISS  -Monitor blood glucose with Accu-Cheks and adjust insulin accordingly    Afib  HTN  HLD  - simvastatin 20mg daily  - warfarin, pharmacy to dose  - continue holding PTA lisinopril 5mg per pt wishes, can restart after discharge    CKD3  - baseline Cr 1-1.2  - holding lisinopril    FTT  - nutrition consult  - SW following    BERLIN  - refusing CPA      Diet: Consistent Carbohydrate Diet Moderate Consistent Carb (60 g CHO per Meal) Diet  Snacks/Supplements Adult: Glucerna; Between Meals  Snacks/Supplements Adult: Gelatein Plus; With Meals    DVT Prophylaxis: Warfarin  Braga Catheter: Not present  Lines: PRESENT      PICC 10/09/23 Triple Lumen Right Basilic-Site Assessment: WDL      Cardiac Monitoring: None  Code Status: Full Code       Clinically Significant Risk Factors              # Hypoalbuminemia: Lowest albumin = 3.2 g/dL at 11/1/2023  6:13 AM, will monitor as appropriate     # Hypertension: Noted on problem list       # DMII: A1C = 7.0 % (Ref range: <5.7 %) within past 6 months              Disposition Plan      Expected Discharge Date: 11/09/2023    Discharge Delays: Complex Discharge  Destination: inpatient rehabilitation facility  Discharge Comments: Wound Care-complex. Pain management.            Claude Rowe MD  Hospitalist Service  [facility]  Securely message with [employer] (more info)  Text page via [employer] Paging/Directory   ______________________________________________________________________    Interval History   No new events overnight per RN.  Patient is in bed comfortably and reports she had a good night.  Progressing well.  She reports no new complaints at this time.    Physical Exam   Vital Signs: Temp: 97.4  F (36.3  C) Temp src: Oral BP: (!) 155/72 Pulse: 84   Resp: 17 SpO2: 99 % O2 Device: None (Room air)    Weight: 246 lbs 6.4 oz  Gen: She is a morbidly obese female lying in bed comfortably no distress  HEET: Head is normocephalic atraumatic eyes pupils appear equal round and reactive to light  Heart: She has a good S1 and S2 no obvious murmurs, no JVD   Lungs: She has a normal respiratory effort on auscultation good air entry is noted.   Abdomen: Soft nontender nondistended bowel sounds are noted  Musculoskeletal: She has good muscle tone, below the left knee amputation noted  Skin: Crusted skin lesion, previous surrounding areas of erythema have resolved.  Please refer to nursing/wound care note for complete skin exam.    Medical Decision Making       40 MINUTES SPENT BY ME on the date of service doing chart review, history, exam, documentation & further activities per the note.      Data   Lab Results   Component Value Date    WBC 6.4 11/01/2023    WBC 9.4 02/22/2008     Lab Results   Component Value Date    RBC 3.04  11/01/2023    RBC 4.19 02/22/2008     Lab Results   Component Value Date    HGB 9.0 11/01/2023    HGB 12.6 02/22/2008     Lab Results   Component Value Date    HCT 29.3 11/01/2023    HCT 35.6 02/22/2008     Lab Results   Component Value Date    MCV 96 11/01/2023    MCV 85 02/22/2008     Lab Results   Component Value Date    MCH 29.6 11/01/2023    MCH 30.1 02/22/2008     Lab Results   Component Value Date    MCHC 30.7 11/01/2023    MCHC 35.4 02/22/2008     Lab Results   Component Value Date    RDW 14.6 11/01/2023    RDW Test not performed 02/22/2008     Lab Results   Component Value Date     11/01/2023     02/22/2008       Last Comprehensive Metabolic Panel:  Sodium   Date Value Ref Range Status   11/01/2023 140 135 - 145 mmol/L Final     Comment:     Reference intervals for this test were updated on 09/26/2023 to more accurately reflect our healthy population. There may be differences in the flagging of prior results with similar values performed with this method. Interpretation of those prior results can be made in the context of the updated reference intervals.    02/22/2008 140 133 - 144 mmol/L Final     Potassium   Date Value Ref Range Status   11/01/2023 4.4 3.4 - 5.3 mmol/L Final   06/28/2022 4.3 3.5 - 5.0 mmol/L Final   02/22/2008 4.5 3.4 - 5.3 mmol/L Final     Chloride   Date Value Ref Range Status   11/01/2023 100 98 - 107 mmol/L Final   06/28/2022 97 (L) 98 - 107 mmol/L Final   02/22/2008 100 94 - 109 mmol/L Final     Carbon Dioxide   Date Value Ref Range Status   02/22/2008 28 20 - 32 mmol/L Final     Carbon Dioxide (CO2)   Date Value Ref Range Status   11/01/2023 29 22 - 29 mmol/L Final   06/28/2022 29 22 - 31 mmol/L Final     Anion Gap   Date Value Ref Range Status   11/01/2023 11 7 - 15 mmol/L Final   06/28/2022 13 5 - 18 mmol/L Final   02/22/2008 11 6 - 17 mmol/L Final     Glucose   Date Value Ref Range Status   06/28/2022 73 70 - 125 mg/dL Final   02/22/2008 298 (H) 60 - 99 mg/dL Final      GLUCOSE BY METER POCT   Date Value Ref Range Status   11/03/2023 161 (H) 70 - 99 mg/dL Final     Urea Nitrogen   Date Value Ref Range Status   11/01/2023 18.0 8.0 - 23.0 mg/dL Final   06/28/2022 42 (H) 8 - 28 mg/dL Final   02/22/2008 15 7 - 30 mg/dL Final     Creatinine   Date Value Ref Range Status   11/01/2023 0.96 (H) 0.51 - 0.95 mg/dL Final   02/22/2008 0.82 0.60 - 1.30 mg/dL Final     GFR Estimate   Date Value Ref Range Status   11/01/2023 60 (L) >60 mL/min/1.73m2 Final   02/22/2008 75 >60 mL/min/1.7m2 Final     Calcium   Date Value Ref Range Status   11/01/2023 9.8 8.8 - 10.2 mg/dL Final   02/22/2008 10.4 8.5 - 10.4 mg/dL Final     Bilirubin Total   Date Value Ref Range Status   11/01/2023 0.2 <=1.2 mg/dL Final   02/22/2008 0.4 0.2 - 1.3 mg/dL Final     Alkaline Phosphatase   Date Value Ref Range Status   11/01/2023 97 35 - 104 U/L Final   02/22/2008 138 40 - 150 U/L Final     ALT   Date Value Ref Range Status   11/01/2023 12 0 - 50 U/L Final     Comment:     Reference intervals for this test were updated on 6/12/2023 to more accurately reflect our healthy population. There may be differences in the flagging of prior results with similar values performed with this method. Interpretation of those prior results can be made in the context of the updated reference intervals.     02/22/2008 28 0 - 50 U/L Final     AST   Date Value Ref Range Status   11/01/2023 19 0 - 45 U/L Final     Comment:     Reference intervals for this test were updated on 6/12/2023 to more accurately reflect our healthy population. There may be differences in the flagging of prior results with similar values performed with this method. Interpretation of those prior results can be made in the context of the updated reference intervals.   02/22/2008 26 0 - 45 U/L Final           I have personally reviewed the following data over the past 24 hrs:    INR:  1.91 (H) PTT:  N/A   D-dimer:  N/A Fibrinogen:  N/A       Imaging results reviewed  over the past 24 hrs:   No results found for this or any previous visit (from the past 24 hour(s)).

## 2023-11-03 NOTE — PLAN OF CARE
"  Problem: Adult Inpatient Plan of Care  Goal: Plan of Care Review  Description: The Plan of Care Review/Shift note should be completed every shift.  The Outcome Evaluation is a brief statement about your assessment that the patient is improving, declining, or no change.  This information will be displayed automatically on your shift  note.  Outcome: Progressing   Goal Outcome Evaluation:        Pt continues to have dry rash over (L) f/a, which has not worsened. Pt offers minor c/o rash itching. Faint rash over (R) inner f/a is nearly resolved now. We are currently not using CHG bath wipes on pt to r/o cause of rash. Will use bath wipes instead for daily bath. Fluid filled blister over (R) shin has now opened up & appears to be scabbing over. Continues to receive Bactroban ointment to affect area. Noted reduced erythema over (R) shin on km shift. Used the bedside commode @ hs to void. On nights uses the PureWick upon her request to void then immediately remove. Pt is on scheduled Methadone, rates her pain a 6/10 & tells writer that it's always a \"6\". HS blood sugar 203, pt doesn't quite know what's causing her blood sugars to be elevated. Slept soundly t/o the night with all lights on in room per usual.       "

## 2023-11-03 NOTE — PLAN OF CARE
Problem: Adult Inpatient Plan of Care  Goal: Plan of Care Review  Description: The Plan of Care Review/Shift note should be completed every shift.  The Outcome Evaluation is a brief statement about your assessment that the patient is improving, declining, or no change.  This information will be displayed automatically on your shift  note.  Outcome: Progressing     Problem: Wound  Goal: Optimal Coping  Outcome: Progressing   Goal Outcome Evaluation:    Patient alert and cooperative; medication and care complaint. Irritable at times. PRN dilaudid given prior to dressing change per MD orders. WOC RN changed wound dressing to left BKA. Ate well at meals; OOB with therapy. Purewick used for voiding. Uneventful shift. Plan of care ongoing. /62 (BP Location: Left arm)   Pulse 99   Temp 97.9  F (36.6  C) (Oral)   Resp 18   Wt 111.8 kg (246 lb 6.4 oz)   SpO2 96%   BMI 39.77 kg/m     Gila Robins RN

## 2023-11-04 ENCOUNTER — APPOINTMENT (OUTPATIENT)
Dept: PHYSICAL THERAPY | Facility: CLINIC | Age: 78
DRG: 560 | End: 2023-11-04
Attending: INTERNAL MEDICINE
Payer: COMMERCIAL

## 2023-11-04 LAB
GLUCOSE BLDC GLUCOMTR-MCNC: 133 MG/DL (ref 70–99)
GLUCOSE BLDC GLUCOMTR-MCNC: 162 MG/DL (ref 70–99)
GLUCOSE BLDC GLUCOMTR-MCNC: 172 MG/DL (ref 70–99)
GLUCOSE BLDC GLUCOMTR-MCNC: 184 MG/DL (ref 70–99)
GLUCOSE BLDC GLUCOMTR-MCNC: 190 MG/DL (ref 70–99)
INR PPP: 1.9 (ref 0.85–1.15)

## 2023-11-04 PROCEDURE — 99232 SBSQ HOSP IP/OBS MODERATE 35: CPT | Performed by: STUDENT IN AN ORGANIZED HEALTH CARE EDUCATION/TRAINING PROGRAM

## 2023-11-04 PROCEDURE — 85610 PROTHROMBIN TIME: CPT | Performed by: STUDENT IN AN ORGANIZED HEALTH CARE EDUCATION/TRAINING PROGRAM

## 2023-11-04 PROCEDURE — 250N000013 HC RX MED GY IP 250 OP 250 PS 637: Performed by: STUDENT IN AN ORGANIZED HEALTH CARE EDUCATION/TRAINING PROGRAM

## 2023-11-04 PROCEDURE — 120N000017 HC R&B RESPIRATORY CARE

## 2023-11-04 PROCEDURE — 97542 WHEELCHAIR MNGMENT TRAINING: CPT | Mod: GP

## 2023-11-04 PROCEDURE — 97530 THERAPEUTIC ACTIVITIES: CPT | Mod: GP

## 2023-11-04 PROCEDURE — 250N000011 HC RX IP 250 OP 636: Performed by: STUDENT IN AN ORGANIZED HEALTH CARE EDUCATION/TRAINING PROGRAM

## 2023-11-04 RX ORDER — WARFARIN SODIUM 2 MG/1
2 TABLET ORAL
Status: COMPLETED | OUTPATIENT
Start: 2023-11-04 | End: 2023-11-04

## 2023-11-04 RX ADMIN — INSULIN ASPART 2 UNITS: 100 INJECTION, SOLUTION INTRAVENOUS; SUBCUTANEOUS at 12:17

## 2023-11-04 RX ADMIN — MULTIPLE VITAMINS W/ MINERALS TAB 1 TABLET: TAB at 08:09

## 2023-11-04 RX ADMIN — ASPIRIN 81 MG: 81 TABLET, COATED ORAL at 08:10

## 2023-11-04 RX ADMIN — INSULIN ASPART 1 UNITS: 100 INJECTION, SOLUTION INTRAVENOUS; SUBCUTANEOUS at 08:11

## 2023-11-04 RX ADMIN — MICONAZOLE NITRATE: 20 POWDER TOPICAL at 08:10

## 2023-11-04 RX ADMIN — METHADONE HYDROCHLORIDE 5 MG: 5 TABLET ORAL at 08:10

## 2023-11-04 RX ADMIN — METHADONE HYDROCHLORIDE 5 MG: 5 TABLET ORAL at 12:17

## 2023-11-04 RX ADMIN — ATORVASTATIN CALCIUM 40 MG: 40 TABLET, FILM COATED ORAL at 21:25

## 2023-11-04 RX ADMIN — MUPIROCIN: 20 OINTMENT TOPICAL at 08:11

## 2023-11-04 RX ADMIN — SENNOSIDES AND DOCUSATE SODIUM 2 TABLET: 50; 8.6 TABLET ORAL at 08:09

## 2023-11-04 RX ADMIN — METHADONE HYDROCHLORIDE 10 MG: 10 TABLET ORAL at 15:49

## 2023-11-04 RX ADMIN — MUPIROCIN: 20 OINTMENT TOPICAL at 21:27

## 2023-11-04 RX ADMIN — SENNOSIDES AND DOCUSATE SODIUM 2 TABLET: 50; 8.6 TABLET ORAL at 21:25

## 2023-11-04 RX ADMIN — TORSEMIDE 15 MG: 10 TABLET ORAL at 08:09

## 2023-11-04 RX ADMIN — HYDROMORPHONE HYDROCHLORIDE 0.3 MG: 1 INJECTION, SOLUTION INTRAMUSCULAR; INTRAVENOUS; SUBCUTANEOUS at 10:29

## 2023-11-04 RX ADMIN — MICONAZOLE NITRATE: 20 POWDER TOPICAL at 21:27

## 2023-11-04 RX ADMIN — METHADONE HYDROCHLORIDE 5 MG: 5 TABLET ORAL at 21:25

## 2023-11-04 RX ADMIN — INSULIN DEGLUDEC INJECTION 12 UNITS: 100 INJECTION, SOLUTION SUBCUTANEOUS at 21:26

## 2023-11-04 RX ADMIN — WARFARIN SODIUM 2 MG: 2 TABLET ORAL at 18:01

## 2023-11-04 RX ADMIN — HYDROMORPHONE HYDROCHLORIDE 4 MG: 4 TABLET ORAL at 09:57

## 2023-11-04 ASSESSMENT — ACTIVITIES OF DAILY LIVING (ADL)
ADLS_ACUITY_SCORE: 45
ADLS_ACUITY_SCORE: 42
ADLS_ACUITY_SCORE: 41
ADLS_ACUITY_SCORE: 41
ADLS_ACUITY_SCORE: 46
ADLS_ACUITY_SCORE: 45
ADLS_ACUITY_SCORE: 42
ADLS_ACUITY_SCORE: 45
ADLS_ACUITY_SCORE: 45
ADLS_ACUITY_SCORE: 41
ADLS_ACUITY_SCORE: 45
ADLS_ACUITY_SCORE: 41

## 2023-11-04 NOTE — PLAN OF CARE
Problem: Adult Inpatient Plan of Care  Goal: Absence of Hospital-Acquired Illness or Injury  Intervention: Identify and Manage Fall Risk  Recent Flowsheet Documentation  Taken 11/4/2023 1637 by Gila Robins RN  Safety Promotion/Fall Prevention: activity supervised  Intervention: Prevent Infection  Recent Flowsheet Documentation  Taken 11/4/2023 1637 by Gila Robins RN  Infection Prevention: hand hygiene promoted     Problem: Wound  Goal: Absence of Infection Signs and Symptoms  Intervention: Prevent or Manage Infection  Recent Flowsheet Documentation  Taken 11/4/2023 1637 by Gila Robins RN  Isolation Precautions: contact precautions maintained     Problem: Pain Acute  Goal: Optimal Pain Control and Function  Outcome: Progressing  Intervention: Prevent or Manage Pain  Recent Flowsheet Documentation  Taken 11/4/2023 1637 by Gila Robins RN  Medication Review/Management: medications reviewed   Goal Outcome Evaluation:         Gila Robins RN

## 2023-11-04 NOTE — PROGRESS NOTES
[facility]    Medicine Progress Note - Hospitalist Service    Date of Admission:  10/24/2023    Hospital Course  Linda Loredo is a 78y F PMHx IDDM, chronic venous stasis, chronic pain syndrome, afib, HTN, HLD, CKD3, BERLIN, and FTT who presents to [facility] for ongoing wound care and therapies. Pt initially presented 9/23 to [facility] after welfare check by police. She was found to have L leg swelling and edema and was diagnosed with polymicrobial osteomyelitis. She is s/p debridement by podiatry on 9/26. Bcx negative at the time. ALMA with poor flow and pt was transferred to [facility] for vascular surgery evaluation. Pt managed with meropenem and vancomycin at the time for osteo. 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 Vascular surgery was unable to close the stump at the time and elected for ongoing healing prior to surgical closure thus she was discharged to [facility] for ongoing wound care and therapy.      [facility]  10/26-10/30: Bowel regimen uptitrated given pt was impacted on arrival and did not completely empty after initial BM. Good BM by end of weekend. BM 10/29; patient not agreeable to increasing scheduled bowel regimen at this time. RLE with redness below knee, low suspicion of infection, monitoring.   10/31.  Progressing well.  Plan for topical Bactroban cream for right lower extremity lesion otherwise no new other changes.  11/1. Hasn't had a definitive bowel movement, patient refusing suppository. Otherwise, just about the same compared to yesterday  11/2.  Patient reports small bowel movements this morning that is corroborated with RN.  Otherwise she is progressing well with wound care and therapies.  No new changes at this time  11/3.  Doing well with wound care.  Skin lesion healing and patient reports bowel movement.  Continue current medical management.  11/4: Stable today.    Assessment & Plan   Skin lesion, right lower extremity.  Acute.     -Improving  -Bactroban cream, apply twice daily to affected areas.    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)  S/p Tibial/Fibular Resection and Debridement of Stump (10/17)  Chronic Venous Stasis  - Completed course of meropenem/vancomycin 10/8 for osteomyelitis  - Vascular surgery planning to close in the future, monthly clinic visits for wound checks with closure at unspecified time in the future  - Dilaudid 4mg PO and 0.3mg IV prn pain control (PO 30min prior and IV 15min prior to dressing changes)  - WOC consulted and following  - PT/OT    Fecal Impaction - resolved  Constipation   - manual disimpaction (10/25)  - dulcolax 10mg x1, later 20mg x1 (10/25)  - fleet enema (10/25)  - doc-senna 2Q BID (with additional 2Q BID prn)  - miralax BID (10/26)  - MoM TID prn  - Viscous lidocaine for anal pain    Acute on Chronic Pain Syndrome  - methadone 5mg TID and 10mg @ 1500  - Dilaudid IV/PO prn pain and with dressing changes  - previous Psych eval PTA, no associated depression    IDDM  - pt on degludec 35u qHS, metformin 2000mg daily, ozempic PTA  - continue insulin degludec 12u qHS   - medium ISS  -Monitor blood glucose with Accu-Cheks and adjust insulin accordingly    Afib  HTN  HLD  - simvastatin 20mg daily  - warfarin, pharmacy to dose  - continue holding PTA lisinopril 5mg per pt wishes, can restart after discharge    CKD3  - baseline Cr 1-1.2  - holding lisinopril    FTT  - nutrition consult  - SW following    BERLIN  - refusing CPA      Diet: Consistent Carbohydrate Diet Moderate Consistent Carb (60 g CHO per Meal) Diet  Snacks/Supplements Adult: Glucerna; Between Meals  Snacks/Supplements Adult: Gelatein Plus; With Meals    DVT Prophylaxis: Warfarin  Braga Catheter: Not present  Lines: PRESENT      PICC 10/09/23 Triple Lumen Right Basilic-Site Assessment: WDL      Cardiac Monitoring: None  Code Status: Full Code      Clinically Significant  Risk Factors              # Hypoalbuminemia: Lowest albumin = 3.2 g/dL at 11/1/2023  6:13 AM, will monitor as appropriate     # Hypertension: Noted on problem list       # DMII: A1C = 7.0 % (Ref range: <5.7 %) within past 6 months              Disposition Plan     Expected Discharge Date: 11/09/2023    Discharge Delays: Complex Discharge  Destination: inpatient rehabilitation facility  Discharge Comments: Wound Care-complex. Pain management.            Miguel Shannon MD  Hospitalist Service  [facility]  Securely message with [employer] (more info)  Text page via [facility] Paging/Directory   ______________________________________________________________________    Interval History   - no acute events ovn  - sitting up in bed  - last BM 3 days ago, says bananas helped her go  - doesn't feel that she needs more bowel regimen  - pain remains stable  - no other acute concerns    Physical Exam   Vital Signs: Temp: 98  F (36.7  C) Temp src: Oral BP: (!) 145/65 Pulse: 77   Resp: 20 SpO2: 100 % O2 Device: None (Room air)    Weight: 246 lbs 6.4 oz  Gen: She is a morbidly obese female lying in bed comfortably no distress  HEET: Head is normocephalic atraumatic eyes pupils appear equal round and reactive to light  Heart: She has a good S1 and S2 no obvious murmurs, no JVD   Lungs: She has a normal respiratory effort on auscultation good air entry is noted.   Abdomen: Soft nontender nondistended bowel sounds are noted  Musculoskeletal: She has good muscle tone, below the left knee amputation noted  Skin: Crusted skin lesion, previous surrounding areas of erythema have resolved.  Please refer to nursing/wound care note for complete skin exam.    Medical Decision Making       40 MINUTES SPENT BY ME on the date of service doing chart review, history, exam, documentation & further activities per the note.      Data   Lab Results   Component Value Date    WBC 6.4 11/01/2023    WBC 9.4 02/22/2008     Lab Results   Component Value Date    RBC 3.04  11/01/2023    RBC 4.19 02/22/2008     Lab Results   Component Value Date    HGB 9.0 11/01/2023    HGB 12.6 02/22/2008     Lab Results   Component Value Date    HCT 29.3 11/01/2023    HCT 35.6 02/22/2008     Lab Results   Component Value Date    MCV 96 11/01/2023    MCV 85 02/22/2008     Lab Results   Component Value Date    MCH 29.6 11/01/2023    MCH 30.1 02/22/2008     Lab Results   Component Value Date    MCHC 30.7 11/01/2023    MCHC 35.4 02/22/2008     Lab Results   Component Value Date    RDW 14.6 11/01/2023    RDW Test not performed 02/22/2008     Lab Results   Component Value Date     11/01/2023     02/22/2008       Last Comprehensive Metabolic Panel:  Sodium   Date Value Ref Range Status   11/01/2023 140 135 - 145 mmol/L Final     Comment:     Reference intervals for this test were updated on 09/26/2023 to more accurately reflect our healthy population. There may be differences in the flagging of prior results with similar values performed with this method. Interpretation of those prior results can be made in the context of the updated reference intervals.    02/22/2008 140 133 - 144 mmol/L Final     Potassium   Date Value Ref Range Status   11/01/2023 4.4 3.4 - 5.3 mmol/L Final   06/28/2022 4.3 3.5 - 5.0 mmol/L Final   02/22/2008 4.5 3.4 - 5.3 mmol/L Final     Chloride   Date Value Ref Range Status   11/01/2023 100 98 - 107 mmol/L Final   06/28/2022 97 (L) 98 - 107 mmol/L Final   02/22/2008 100 94 - 109 mmol/L Final     Carbon Dioxide   Date Value Ref Range Status   02/22/2008 28 20 - 32 mmol/L Final     Carbon Dioxide (CO2)   Date Value Ref Range Status   11/01/2023 29 22 - 29 mmol/L Final   06/28/2022 29 22 - 31 mmol/L Final     Anion Gap   Date Value Ref Range Status   11/01/2023 11 7 - 15 mmol/L Final   06/28/2022 13 5 - 18 mmol/L Final   02/22/2008 11 6 - 17 mmol/L Final     Glucose   Date Value Ref Range Status   06/28/2022 73 70 - 125 mg/dL Final   02/22/2008 298 (H) 60 - 99 mg/dL Final      GLUCOSE BY METER POCT   Date Value Ref Range Status   11/03/2023 210 (H) 70 - 99 mg/dL Final     Urea Nitrogen   Date Value Ref Range Status   11/01/2023 18.0 8.0 - 23.0 mg/dL Final   06/28/2022 42 (H) 8 - 28 mg/dL Final   02/22/2008 15 7 - 30 mg/dL Final     Creatinine   Date Value Ref Range Status   11/01/2023 0.96 (H) 0.51 - 0.95 mg/dL Final   02/22/2008 0.82 0.60 - 1.30 mg/dL Final     GFR Estimate   Date Value Ref Range Status   11/01/2023 60 (L) >60 mL/min/1.73m2 Final   02/22/2008 75 >60 mL/min/1.7m2 Final     Calcium   Date Value Ref Range Status   11/01/2023 9.8 8.8 - 10.2 mg/dL Final   02/22/2008 10.4 8.5 - 10.4 mg/dL Final     Bilirubin Total   Date Value Ref Range Status   11/01/2023 0.2 <=1.2 mg/dL Final   02/22/2008 0.4 0.2 - 1.3 mg/dL Final     Alkaline Phosphatase   Date Value Ref Range Status   11/01/2023 97 35 - 104 U/L Final   02/22/2008 138 40 - 150 U/L Final     ALT   Date Value Ref Range Status   11/01/2023 12 0 - 50 U/L Final     Comment:     Reference intervals for this test were updated on 6/12/2023 to more accurately reflect our healthy population. There may be differences in the flagging of prior results with similar values performed with this method. Interpretation of those prior results can be made in the context of the updated reference intervals.     02/22/2008 28 0 - 50 U/L Final     AST   Date Value Ref Range Status   11/01/2023 19 0 - 45 U/L Final     Comment:     Reference intervals for this test were updated on 6/12/2023 to more accurately reflect our healthy population. There may be differences in the flagging of prior results with similar values performed with this method. Interpretation of those prior results can be made in the context of the updated reference intervals.   02/22/2008 26 0 - 45 U/L Final           I have personally reviewed the following data over the past 24 hrs:    INR:  N/A PTT:  N/A   D-dimer:  N/A Fibrinogen:  N/A       Imaging results reviewed over the  past 24 hrs:   No results found for this or any previous visit (from the past 24 hour(s)).

## 2023-11-04 NOTE — PLAN OF CARE
Problem: Pain Acute  Goal: Optimal Pain Control and Function  Outcome: Not Progressing  Intervention: Prevent or Manage Pain  Recent Flowsheet Documentation  Taken 11/4/2023 0803 by Bob Gutierrez RN  Sensory Stimulation Regulation:   care clustered   television on  Bowel Elimination Promotion: adequate fluid intake promoted  Medication Review/Management: medications reviewed  Intervention: Optimize Psychosocial Wellbeing  Recent Flowsheet Documentation  Taken 11/4/2023 0803 by Bob Gutierrez RN  Supportive Measures: active listening utilized   Goal Outcome Evaluation:    VSS. Alert and oriented x 4. Extremity pain always 6/10 before and after pain meds. Denies nausea, dizziness, shortness of breath and light headedness. On room air. PRN dilaudid PO and IV given before wound care. Adequate intake and output. No BM for the shift. Uses pure wick PRN. Uses of call lights appropriately. Mood calm and cooperative.

## 2023-11-04 NOTE — PROGRESS NOTES
CLINICAL NUTRITION SERVICES - REASSESSMENT NOTE      Recommendations Ordered by Registered Dietitian (RD):   Ordered Expedite daily   Future/Additional Recommendations:   Continue to monitor PO intakes, wound healing, weight trends, stooling   Malnutrition:   % Weight Loss:  None noted  % Intake:  No decreased intake noted  Subcutaneous Fat Loss:  None observed  Muscle Loss:  Temporal region mild depletion, Clavicle bone region mild depletion, and Dorsal hand region mild depletion  Fluid Retention:  Moderate-severe in bilateral LE    Malnutrition Diagnosis: Patient does not meet two of the above criteria necessary for diagnosing malnutrition     EVALUATION OF PROGRESS TOWARD GOALS   Diet:  Orders Placed This Encounter      Consistent Carbohydrate Diet Moderate Consistent Carb (60 g CHO per Meal) Diet    Intake/Tolerance:    Patient states that she is eating well, 100% of meals TID. Not reflected in flowsheets. Likes strawberry Glucerna, will continue. Discussed Expedite/Dave, patient willing to try.    ASSESSED NUTRITION NEEDS:  Dosing Weight 111 kg (current weight), IBW 59 kg for protein needs  Estimated Energy Needs: 2992-6599+ kcals (14-17+ Kcal/Kg)  Justification: obesity guidelines, would aim for high-end estimated needs or above d/t increased needs for wound healing  Estimated Protein Needs: 118-148 grams protein (2-2.5 g pro/Kg IBW)  Justification: wound healing and obesity guidelines   Estimated Fluid Needs: 1 mL/Kcal or per provider pending fluid status    NEW FINDINGS:   - Overall stable, doing well w/ wound cares, constipated    Skin: saritha CRUZ 10/7  I/O: -10L in 2 weeks per chart   BM: LBM 11/2, only BM recorded this week per chart  Meds: novolog, methadone, MVI/M, Miralax BID, senna-docusate BID  Electrolytes: WNL  BG: intermittent hyperglycemia  Weight: stable, 244-246#  Recent Labs   Lab 11/04/23  1146 11/04/23  0806 11/03/23  2122 11/03/23  1700 11/03/23  1157 11/03/23  0805   * 162*  210* 165* 161* 142*     Labs:  Electrolytes  Potassium (mmol/L)   Date Value   11/01/2023 4.4   10/21/2023 4.2   10/20/2023 3.6   06/28/2022 4.3   02/22/2008 4.5   10/15/2007 4.1   09/18/2007 4.0     Phosphorus (mg/dL)   Date Value   10/21/2023 3.2   10/17/2023 2.6   10/10/2023 2.9   06/20/2005 3.3    Blood Glucose  Glucose (mg/dL)   Date Value   06/28/2022 73   02/22/2008 298 (H)   10/15/2007 232 (H)   09/18/2007 199 (H)   09/05/2007 102 (H)   06/08/2007 245 (H)     GLUCOSE BY METER POCT (mg/dL)   Date Value   11/04/2023 190 (H)   11/04/2023 162 (H)   11/03/2023 210 (H)   11/03/2023 165 (H)   11/03/2023 161 (H)     Hemoglobin A1C (%)   Date Value   09/23/2023 7.0 (H)   05/27/2023 8.2 (H)   12/19/2007 10.0 (H)   09/05/2007 8.8 (A)   06/08/2007 10.1 (H)   12/22/2006 8.6 (H)   09/18/2006 7.3 (H)    Inflammatory Markers  CRP Inflammation (mg/L)   Date Value   03/28/2008 16.0 (H)     WBC (10e9/L)   Date Value   02/22/2008 9.4   09/18/2007 9.3   02/24/2006 8.5     WBC Count (10e3/uL)   Date Value   11/01/2023 6.4   10/21/2023 7.3   10/17/2023 7.3     Albumin (g/dL)   Date Value   11/01/2023 3.2 (L)   09/22/2023 3.1 (L)   05/29/2023 2.8 (L)   02/22/2008 4.1   10/15/2007 4.4   06/08/2007 4.3      Magnesium (mg/dL)   Date Value   10/21/2023 1.9   10/17/2023 1.7   10/10/2023 2.0   06/20/2005 1.4 (L)     Sodium (mmol/L)   Date Value   11/01/2023 140   10/21/2023 142   10/20/2023 141   02/22/2008 140   10/15/2007 141   09/18/2007 142    Renal  Urea Nitrogen (mg/dL)   Date Value   11/01/2023 18.0   10/21/2023 20.3   10/20/2023 19.0   06/28/2022 42 (H)   02/22/2008 15   10/15/2007 17   09/18/2007 14     Creatinine (mg/dL)   Date Value   11/01/2023 0.96 (H)   10/21/2023 1.02 (H)   10/20/2023 1.04 (H)   02/22/2008 0.82   10/15/2007 0.87   09/18/2007 0.75     Additional  Triglycerides (mg/dL)   Date Value   09/30/2023 49   09/30/2023 58   06/08/2007 70   12/22/2006 78   09/18/2006 93     Ketones Urine (mg/dL)   Date Value    09/23/2023 Negative   09/18/2006 Trace (A)        Previous Goals:   Patient to consume >75% of TID meals + 2 supplements per day - met per patient  BG WNL - progressing  Wound healing - progressing    Previous Nutrition Diagnosis:   Increased nutrient needs (protein) related to wound healing as evidenced by recent BKA, osteomyelitis.   Evaluation: No change    CURRENT NUTRITION DIAGNOSIS  Increased nutrient needs (protein) related to wound healing as evidenced by recent BKA, osteomyelitis.     INTERVENTIONS  Recommendations / Nutrition Prescription  See top of note.    Implementation  Medical Food Supplement and Multivitamin/Mineral    Goals  Patient to consume >75% of TID meals + 2 supplements per day  BG WNL  Wound healing    MONITORING AND EVALUATION:  Progress towards goals will be monitored and evaluated per protocol and Practice Guidelines    Fide Ramirez RDN, LD  Clinical Dietitian

## 2023-11-04 NOTE — PLAN OF CARE
Goal Outcome Evaluation:  Patient is alert and oriented x 4, vital signs are stable. BP (!) 145/65 (BP Location: Left arm)   Pulse 77   Temp 98  F (36.7  C) (Oral)   Resp 20   Wt 111.8 kg (246 lb 6.4 oz)   SpO2 100%   BMI 39.77 kg/m    Uneventful night for the most part, pt slept between cares.   The patient is able to verbalize their needs, denies pain that is worthy of intervention (She states she is at a constant 6/10 for pain due to peripheral neuropathy) remained  mostly calm and cooperative throughout the shift. She can occasionally get irritable and insists on documenting every action on her own. Has Purewick on standby with the suction causing issues, necessitating bed change. Continue to monitor.  Vimal Yoder RN on 11/4/2023 at 7:15 AM

## 2023-11-05 LAB
GLUCOSE BLDC GLUCOMTR-MCNC: 146 MG/DL (ref 70–99)
GLUCOSE BLDC GLUCOMTR-MCNC: 176 MG/DL (ref 70–99)
GLUCOSE BLDC GLUCOMTR-MCNC: 186 MG/DL (ref 70–99)
GLUCOSE BLDC GLUCOMTR-MCNC: 204 MG/DL (ref 70–99)
INR PPP: 1.69 (ref 0.85–1.15)

## 2023-11-05 PROCEDURE — 250N000013 HC RX MED GY IP 250 OP 250 PS 637: Performed by: HOSPITALIST

## 2023-11-05 PROCEDURE — 120N000017 HC R&B RESPIRATORY CARE

## 2023-11-05 PROCEDURE — 99232 SBSQ HOSP IP/OBS MODERATE 35: CPT | Performed by: HOSPITALIST

## 2023-11-05 PROCEDURE — 250N000011 HC RX IP 250 OP 636: Performed by: STUDENT IN AN ORGANIZED HEALTH CARE EDUCATION/TRAINING PROGRAM

## 2023-11-05 PROCEDURE — 85610 PROTHROMBIN TIME: CPT | Performed by: STUDENT IN AN ORGANIZED HEALTH CARE EDUCATION/TRAINING PROGRAM

## 2023-11-05 PROCEDURE — 250N000013 HC RX MED GY IP 250 OP 250 PS 637: Performed by: STUDENT IN AN ORGANIZED HEALTH CARE EDUCATION/TRAINING PROGRAM

## 2023-11-05 RX ORDER — WARFARIN SODIUM 2.5 MG/1
2.5 TABLET ORAL
Status: COMPLETED | OUTPATIENT
Start: 2023-11-05 | End: 2023-11-05

## 2023-11-05 RX ORDER — HYDROCORTISONE 2.5 %
CREAM (GRAM) TOPICAL 2 TIMES DAILY
Status: DISCONTINUED | OUTPATIENT
Start: 2023-11-05 | End: 2023-11-06

## 2023-11-05 RX ADMIN — INSULIN DEGLUDEC INJECTION 12 UNITS: 100 INJECTION, SOLUTION SUBCUTANEOUS at 21:32

## 2023-11-05 RX ADMIN — METHADONE HYDROCHLORIDE 5 MG: 5 TABLET ORAL at 11:51

## 2023-11-05 RX ADMIN — TORSEMIDE 15 MG: 10 TABLET ORAL at 08:15

## 2023-11-05 RX ADMIN — MULTIPLE VITAMINS W/ MINERALS TAB 1 TABLET: TAB at 08:16

## 2023-11-05 RX ADMIN — MUPIROCIN: 20 OINTMENT TOPICAL at 21:27

## 2023-11-05 RX ADMIN — POLYETHYLENE GLYCOL 3350 17 G: 17 POWDER, FOR SOLUTION ORAL at 10:00

## 2023-11-05 RX ADMIN — MUPIROCIN: 20 OINTMENT TOPICAL at 08:16

## 2023-11-05 RX ADMIN — INSULIN ASPART 1 UNITS: 100 INJECTION, SOLUTION INTRAVENOUS; SUBCUTANEOUS at 17:16

## 2023-11-05 RX ADMIN — POLYETHYLENE GLYCOL 3350 17 G: 17 POWDER, FOR SOLUTION ORAL at 21:27

## 2023-11-05 RX ADMIN — SENNOSIDES AND DOCUSATE SODIUM 2 TABLET: 50; 8.6 TABLET ORAL at 21:26

## 2023-11-05 RX ADMIN — METHADONE HYDROCHLORIDE 10 MG: 10 TABLET ORAL at 16:49

## 2023-11-05 RX ADMIN — METHADONE HYDROCHLORIDE 5 MG: 5 TABLET ORAL at 08:16

## 2023-11-05 RX ADMIN — INSULIN ASPART 1 UNITS: 100 INJECTION, SOLUTION INTRAVENOUS; SUBCUTANEOUS at 09:00

## 2023-11-05 RX ADMIN — HYDROMORPHONE HYDROCHLORIDE 0.3 MG: 1 INJECTION, SOLUTION INTRAMUSCULAR; INTRAVENOUS; SUBCUTANEOUS at 14:07

## 2023-11-05 RX ADMIN — HYDROCORTISONE: 25 CREAM TOPICAL at 14:26

## 2023-11-05 RX ADMIN — INSULIN ASPART 1 UNITS: 100 INJECTION, SOLUTION INTRAVENOUS; SUBCUTANEOUS at 12:56

## 2023-11-05 RX ADMIN — MICONAZOLE NITRATE: 20 POWDER TOPICAL at 08:16

## 2023-11-05 RX ADMIN — ATORVASTATIN CALCIUM 40 MG: 40 TABLET, FILM COATED ORAL at 21:24

## 2023-11-05 RX ADMIN — BISACODYL 10 MG: 10 SUPPOSITORY RECTAL at 09:59

## 2023-11-05 RX ADMIN — METHADONE HYDROCHLORIDE 5 MG: 5 TABLET ORAL at 21:26

## 2023-11-05 RX ADMIN — MICONAZOLE NITRATE: 20 POWDER TOPICAL at 21:27

## 2023-11-05 RX ADMIN — HYDROCORTISONE: 25 CREAM TOPICAL at 21:26

## 2023-11-05 RX ADMIN — WARFARIN SODIUM 2.5 MG: 2.5 TABLET ORAL at 18:35

## 2023-11-05 RX ADMIN — ASPIRIN 81 MG: 81 TABLET, COATED ORAL at 08:16

## 2023-11-05 RX ADMIN — HYDROMORPHONE HYDROCHLORIDE 4 MG: 4 TABLET ORAL at 13:33

## 2023-11-05 ASSESSMENT — ACTIVITIES OF DAILY LIVING (ADL)
ADLS_ACUITY_SCORE: 41

## 2023-11-05 NOTE — PLAN OF CARE
Goal Outcome Evaluation:     Patient is alert and oriented x 4, vital signs are stable. /62   Pulse 72   Temp 98  F (36.7  C)   Resp 18   Wt 111.8 kg (246 lb 6.4 oz)   SpO2 98%   BMI 39.77 kg/m      The patient is able to verbalize their needs, denies pain (denies anything above her baseline of 6/10) or anxiety, and remained calm and cooperative throughout the shift.   At the end of shift a rash on her hand, bilateral,  was noticed. Follow up with pt duglas Yoder RN on 11/5/2023 at 7:21 AM

## 2023-11-05 NOTE — PROGRESS NOTES
[facility]    Medicine Progress Note - Hospitalist Service    Date of Admission:  10/24/2023    Hospital Course  Linda Loredo is a 78y F PMHx IDDM, chronic venous stasis, chronic pain syndrome, afib, HTN, HLD, CKD3, BERLIN, and FTT who presents to [facility] for ongoing wound care and therapies. Pt initially presented 9/23 to [facility] after welfare check by police. She was found to have L leg swelling and edema and was diagnosed with polymicrobial osteomyelitis. She is s/p debridement by podiatry on 9/26. Bcx negative at the time. ALMA with poor flow and pt was transferred to [facility] for vascular surgery evaluation. Pt managed with meropenem and vancomycin at the time for osteo. 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 Vascular surgery was unable to close the stump at the time and elected for ongoing healing prior to surgical closure thus she was discharged to [facility] for ongoing wound care and therapy.      [facility] Course  10/26-10/30: Bowel regimen uptitrated given pt was impacted on arrival and did not completely empty after initial BM. Good BM by end of weekend. BM 10/29; patient not agreeable to increasing scheduled bowel regimen at this time. RLE with redness below knee, low suspicion of infection, monitoring.   10/31.  Progressing well.  Plan for topical Bactroban cream for right lower extremity lesion otherwise no new other changes.  11/1. Hasn't had a definitive bowel movement, patient refusing suppository. Otherwise, just about the same compared to yesterday  11/2.  Patient reports small bowel movements this morning that is corroborated with RN.  Otherwise she is progressing well with wound care and therapies.  No new changes at this time  11/3.  Doing well with wound care.  Skin lesion healing and patient reports bowel movement.  Continue current medical management.    10/31 - 11/5.  Progressing well.  We have had difficulty with constipation.  Has been  refusing suppository but now seems agreeable to cathartics and stool softeners.  Has been having minimal bowel movements.  11/5 itchy rash noted on both hands most likely an allergic reaction.  Plan for hydrocortisone cream    Assessment & Plan    - Continue with current medical management    Skin lesion, right lower extremity.  Acute.    -Improving  -Bactroban cream, apply twice daily to affected areas.    Dermatitis on hands, acute, most likely allergic reaction.  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)  S/p Tibial/Fibular Resection and Debridement of Stump (10/17)  Chronic Venous Stasis  - Completed course of meropenem/vancomycin 10/8 for osteomyelitis  - Vascular surgery planning to close in the future, monthly clinic visits for wound checks with closure at unspecified time in the future  - Dilaudid 4mg PO and 0.3mg IV prn pain control (PO 30min prior and IV 15min prior to dressing changes)  - WOC consulted and following  - PT/OT    Constipation   - manual disimpaction (10/25)  - dulcolax 10mg x1, later 20mg x1 (10/25)  - fleet enema (10/25)  - doc-senna 2Q BID (with additional 2Q BID prn)  - miralax BID (10/26)  - MoM TID prn  - Viscous lidocaine for anal pain    Acute on Chronic Pain Syndrome  - methadone 5mg TID and 10mg @ 1500  - Dilaudid IV/PO prn pain and with dressing changes  - previous Psych eval PTA, no associated depression    IDDM  - Tresiba 12u qHS,   - ISS  - Monitor blood glucose with Accu-Cheks and adjust insulin accordingly    Afib  HTN  HLD  -Rate controlled at this time.  -Warfarin, pharmacy to dose  -Continue with statin  -Holding PTA lisinopril 5mg per pt wishes, can restart after discharge    CKD3.  Stable at this time  - baseline Cr 1-1.2  - holding lisinopril  -Monitor with BMP    FTT  - nutrition consult  - SW following    BERLIN  - refusing CPAP      Diet: Consistent  Carbohydrate Diet Moderate Consistent Carb (60 g CHO per Meal) Diet  Snacks/Supplements Adult: Glucerna; Between Meals  Snacks/Supplements Adult: Expedite Bottle; With Meals    DVT Prophylaxis: Warfarin  Braga Catheter: Not present  Lines: PRESENT      PICC 10/09/23 Triple Lumen Right Basilic-Site Assessment: WDL      Cardiac Monitoring: None  Code Status: Full Code      Clinically Significant Risk Factors              # Hypoalbuminemia: Lowest albumin = 3.2 g/dL at 11/1/2023  6:13 AM, will monitor as appropriate     # Hypertension: Noted on problem list       # DMII: A1C = 7.0 % (Ref range: <5.7 %) within past 6 months              Disposition Plan     Expected Discharge Date: 11/09/2023    Discharge Delays: Complex Discharge  Destination: inpatient rehabilitation facility  Discharge Comments: Wound Care-complex. Pain management.            Claude Rowe MD  Hospitalist Service  [facility]  Securely message with [employer] (more info)  Text page via [employer] Paging/Directory   ______________________________________________________________________    Interval History   Patient is in bed comfortably.  RN reports patient did not take her senna this morning.  Patient reports she had a small bowel movement and is now willing to take her senna/Dulcolax.  Complains of itchy rash on hands, plan for hydrocortisone cream.    Physical Exam   Vital Signs: Temp: 97.5  F (36.4  C) Temp src: Oral BP: (!) 149/82 Pulse: 88   Resp: 20 SpO2: 95 % O2 Device: None (Room air)    Weight: 246 lbs 6.4 oz  Gen: She is a morbidly obese female lying in bed comfortably no distress  HEET: Head is normocephalic atraumatic eyes pupils appear equal round and reactive to light  Heart: She has a good S1 and S2 no obvious murmurs, no JVD   Lungs: She has a normal respiratory effort on auscultation good air entry is noted.   Abdomen: Soft nontender nondistended bowel sounds are noted  Musculoskeletal: She has good muscle tone, below the left knee amputation  noted  Skin: Crusted skin lesion on right leg, previous surrounding areas of erythema have resolved.  Erythematous macules noted on both hands.  Please refer to nursing/wound care note for complete skin exam.    Medical Decision Making       40 MINUTES SPENT BY ME on the date of service doing chart review, history, exam, documentation & further activities per the note.      Data   Lab Results   Component Value Date    WBC 6.4 11/01/2023    WBC 9.4 02/22/2008     Lab Results   Component Value Date    RBC 3.04 11/01/2023    RBC 4.19 02/22/2008     Lab Results   Component Value Date    HGB 9.0 11/01/2023    HGB 12.6 02/22/2008     Lab Results   Component Value Date    HCT 29.3 11/01/2023    HCT 35.6 02/22/2008     Lab Results   Component Value Date    MCV 96 11/01/2023    MCV 85 02/22/2008     Lab Results   Component Value Date    MCH 29.6 11/01/2023    MCH 30.1 02/22/2008     Lab Results   Component Value Date    MCHC 30.7 11/01/2023    MCHC 35.4 02/22/2008     Lab Results   Component Value Date    RDW 14.6 11/01/2023    RDW Test not performed 02/22/2008     Lab Results   Component Value Date     11/01/2023     02/22/2008       Last Comprehensive Metabolic Panel:  Sodium   Date Value Ref Range Status   11/01/2023 140 135 - 145 mmol/L Final     Comment:     Reference intervals for this test were updated on 09/26/2023 to more accurately reflect our healthy population. There may be differences in the flagging of prior results with similar values performed with this method. Interpretation of those prior results can be made in the context of the updated reference intervals.    02/22/2008 140 133 - 144 mmol/L Final     Potassium   Date Value Ref Range Status   11/01/2023 4.4 3.4 - 5.3 mmol/L Final   06/28/2022 4.3 3.5 - 5.0 mmol/L Final   02/22/2008 4.5 3.4 - 5.3 mmol/L Final     Chloride   Date Value Ref Range Status   11/01/2023 100 98 - 107 mmol/L Final   06/28/2022 97 (L) 98 - 107 mmol/L Final   02/22/2008  100 94 - 109 mmol/L Final     Carbon Dioxide   Date Value Ref Range Status   02/22/2008 28 20 - 32 mmol/L Final     Carbon Dioxide (CO2)   Date Value Ref Range Status   11/01/2023 29 22 - 29 mmol/L Final   06/28/2022 29 22 - 31 mmol/L Final     Anion Gap   Date Value Ref Range Status   11/01/2023 11 7 - 15 mmol/L Final   06/28/2022 13 5 - 18 mmol/L Final   02/22/2008 11 6 - 17 mmol/L Final     Glucose   Date Value Ref Range Status   06/28/2022 73 70 - 125 mg/dL Final   02/22/2008 298 (H) 60 - 99 mg/dL Final     GLUCOSE BY METER POCT   Date Value Ref Range Status   11/05/2023 176 (H) 70 - 99 mg/dL Final     Urea Nitrogen   Date Value Ref Range Status   11/01/2023 18.0 8.0 - 23.0 mg/dL Final   06/28/2022 42 (H) 8 - 28 mg/dL Final   02/22/2008 15 7 - 30 mg/dL Final     Creatinine   Date Value Ref Range Status   11/01/2023 0.96 (H) 0.51 - 0.95 mg/dL Final   02/22/2008 0.82 0.60 - 1.30 mg/dL Final     GFR Estimate   Date Value Ref Range Status   11/01/2023 60 (L) >60 mL/min/1.73m2 Final   02/22/2008 75 >60 mL/min/1.7m2 Final     Calcium   Date Value Ref Range Status   11/01/2023 9.8 8.8 - 10.2 mg/dL Final   02/22/2008 10.4 8.5 - 10.4 mg/dL Final     Bilirubin Total   Date Value Ref Range Status   11/01/2023 0.2 <=1.2 mg/dL Final   02/22/2008 0.4 0.2 - 1.3 mg/dL Final     Alkaline Phosphatase   Date Value Ref Range Status   11/01/2023 97 35 - 104 U/L Final   02/22/2008 138 40 - 150 U/L Final     ALT   Date Value Ref Range Status   11/01/2023 12 0 - 50 U/L Final     Comment:     Reference intervals for this test were updated on 6/12/2023 to more accurately reflect our healthy population. There may be differences in the flagging of prior results with similar values performed with this method. Interpretation of those prior results can be made in the context of the updated reference intervals.     02/22/2008 28 0 - 50 U/L Final     AST   Date Value Ref Range Status   11/01/2023 19 0 - 45 U/L Final     Comment:     Reference  intervals for this test were updated on 6/12/2023 to more accurately reflect our healthy population. There may be differences in the flagging of prior results with similar values performed with this method. Interpretation of those prior results can be made in the context of the updated reference intervals.   02/22/2008 26 0 - 45 U/L Final           I have personally reviewed the following data over the past 24 hrs:    INR:  1.69 (H) PTT:  N/A   D-dimer:  N/A Fibrinogen:  N/A       Imaging results reviewed over the past 24 hrs:   No results found for this or any previous visit (from the past 24 hour(s)).

## 2023-11-05 NOTE — PLAN OF CARE
Problem: Adult Inpatient Plan of Care  Goal: Plan of Care Review  Description: The Plan of Care Review/Shift note should be completed every shift.  The Outcome Evaluation is a brief statement about your assessment that the patient is improving, declining, or no change.  This information will be displayed automatically on your shift  note.  Outcome: Progressing     Problem: Wound  Goal: Optimal Coping  Outcome: Progressing  Intervention: Support Patient and Family Response  Recent Flowsheet Documentation  Taken 11/5/2023 4548 by Gila Robins RN  Supportive Measures: active listening utilized    Patient alert and cooperative; pleasant on approach. Medication and care compliant except for Senna this AM. Pt refused to take medication because it was a different color and she thought it could be what was causing her rash to be worse. Later in shift pt became constipated; stool hard and difficult to pass. Given prn suppository which was somewhat helpful; medium results. MD notified of hard stool, bilateral hand rash, and refusal of some bowel meds. Hydrocortisone cream ordered for rash. PRN dilaudid given prior to stump dressing change per order which was helpful. Patient requested 1500 Methadone be given at 1630 d/t dilaudid being given for dressing change. She stated she was told dilaudid and Methadone should be given 2 hours apart. Time changed in MAR and receiving RN notified. Plan of care ongoing. /58 (BP Location: Left arm)   Pulse 76   Temp 98  F (36.7  C) (Oral)   Resp 20   Wt 111.8 kg (246 lb 6.4 oz)   SpO2 100%   BMI 39.77 kg/m     Gila Robins RN

## 2023-11-06 ENCOUNTER — APPOINTMENT (OUTPATIENT)
Dept: PHYSICAL THERAPY | Facility: CLINIC | Age: 78
DRG: 560 | End: 2023-11-06
Attending: INTERNAL MEDICINE
Payer: COMMERCIAL

## 2023-11-06 LAB
GLUCOSE BLDC GLUCOMTR-MCNC: 148 MG/DL (ref 70–99)
GLUCOSE BLDC GLUCOMTR-MCNC: 155 MG/DL (ref 70–99)
GLUCOSE BLDC GLUCOMTR-MCNC: 174 MG/DL (ref 70–99)
GLUCOSE BLDC GLUCOMTR-MCNC: 175 MG/DL (ref 70–99)
INR PPP: 1.66 (ref 0.85–1.15)

## 2023-11-06 PROCEDURE — 97542 WHEELCHAIR MNGMENT TRAINING: CPT | Mod: GP | Performed by: PHYSICAL THERAPIST

## 2023-11-06 PROCEDURE — 97530 THERAPEUTIC ACTIVITIES: CPT | Mod: GP | Performed by: PHYSICAL THERAPIST

## 2023-11-06 PROCEDURE — 99232 SBSQ HOSP IP/OBS MODERATE 35: CPT | Performed by: STUDENT IN AN ORGANIZED HEALTH CARE EDUCATION/TRAINING PROGRAM

## 2023-11-06 PROCEDURE — 120N000017 HC R&B RESPIRATORY CARE

## 2023-11-06 PROCEDURE — 250N000011 HC RX IP 250 OP 636: Performed by: STUDENT IN AN ORGANIZED HEALTH CARE EDUCATION/TRAINING PROGRAM

## 2023-11-06 PROCEDURE — 250N000013 HC RX MED GY IP 250 OP 250 PS 637: Performed by: STUDENT IN AN ORGANIZED HEALTH CARE EDUCATION/TRAINING PROGRAM

## 2023-11-06 PROCEDURE — 999N000150 HC STATISTIC PT MED CONFERENCE < 30 MIN: Performed by: PHYSICAL THERAPIST

## 2023-11-06 PROCEDURE — 85610 PROTHROMBIN TIME: CPT | Performed by: STUDENT IN AN ORGANIZED HEALTH CARE EDUCATION/TRAINING PROGRAM

## 2023-11-06 RX ORDER — HYDROCORTISONE 2.5 %
CREAM (GRAM) TOPICAL 2 TIMES DAILY
Status: DISCONTINUED | OUTPATIENT
Start: 2023-11-06 | End: 2023-11-15 | Stop reason: HOSPADM

## 2023-11-06 RX ORDER — AMOXICILLIN 250 MG
4 CAPSULE ORAL 2 TIMES DAILY
Status: DISCONTINUED | OUTPATIENT
Start: 2023-11-06 | End: 2023-11-15 | Stop reason: HOSPADM

## 2023-11-06 RX ORDER — WARFARIN SODIUM 3 MG/1
3 TABLET ORAL
Status: COMPLETED | OUTPATIENT
Start: 2023-11-06 | End: 2023-11-06

## 2023-11-06 RX ORDER — HYDROMORPHONE HYDROCHLORIDE 2 MG/1
2 TABLET ORAL EVERY 4 HOURS PRN
Status: DISCONTINUED | OUTPATIENT
Start: 2023-11-06 | End: 2023-11-15 | Stop reason: HOSPADM

## 2023-11-06 RX ADMIN — MUPIROCIN: 20 OINTMENT TOPICAL at 09:26

## 2023-11-06 RX ADMIN — METHADONE HYDROCHLORIDE 5 MG: 5 TABLET ORAL at 20:44

## 2023-11-06 RX ADMIN — TORSEMIDE 15 MG: 10 TABLET ORAL at 09:25

## 2023-11-06 RX ADMIN — INSULIN ASPART 1 UNITS: 100 INJECTION, SOLUTION INTRAVENOUS; SUBCUTANEOUS at 17:45

## 2023-11-06 RX ADMIN — ASPIRIN 81 MG: 81 TABLET, COATED ORAL at 09:25

## 2023-11-06 RX ADMIN — MICONAZOLE NITRATE: 20 POWDER TOPICAL at 20:46

## 2023-11-06 RX ADMIN — SENNOSIDES AND DOCUSATE SODIUM 2 TABLET: 50; 8.6 TABLET ORAL at 09:24

## 2023-11-06 RX ADMIN — HYDROCORTISONE: 25 CREAM TOPICAL at 21:02

## 2023-11-06 RX ADMIN — INSULIN ASPART 1 UNITS: 100 INJECTION, SOLUTION INTRAVENOUS; SUBCUTANEOUS at 12:39

## 2023-11-06 RX ADMIN — HYDROMORPHONE HYDROCHLORIDE 4 MG: 4 TABLET ORAL at 15:57

## 2023-11-06 RX ADMIN — DOCUSATE SODIUM AND SENNOSIDES 4 TABLET: 8.6; 5 TABLET, FILM COATED ORAL at 20:44

## 2023-11-06 RX ADMIN — WHITE PETROLATUM: 1.75 OINTMENT TOPICAL at 09:25

## 2023-11-06 RX ADMIN — HYDROMORPHONE HYDROCHLORIDE 0.3 MG: 1 INJECTION, SOLUTION INTRAMUSCULAR; INTRAVENOUS; SUBCUTANEOUS at 16:50

## 2023-11-06 RX ADMIN — METHADONE HYDROCHLORIDE 5 MG: 5 TABLET ORAL at 12:53

## 2023-11-06 RX ADMIN — WARFARIN SODIUM 3 MG: 3 TABLET ORAL at 17:45

## 2023-11-06 RX ADMIN — ATORVASTATIN CALCIUM 40 MG: 40 TABLET, FILM COATED ORAL at 20:44

## 2023-11-06 RX ADMIN — MUPIROCIN: 20 OINTMENT TOPICAL at 20:45

## 2023-11-06 RX ADMIN — POLYETHYLENE GLYCOL 3350 17 G: 17 POWDER, FOR SOLUTION ORAL at 20:45

## 2023-11-06 RX ADMIN — INSULIN DEGLUDEC INJECTION 12 UNITS: 100 INJECTION, SOLUTION SUBCUTANEOUS at 20:53

## 2023-11-06 RX ADMIN — METHADONE HYDROCHLORIDE 5 MG: 5 TABLET ORAL at 09:23

## 2023-11-06 RX ADMIN — POLYETHYLENE GLYCOL 3350 17 G: 17 POWDER, FOR SOLUTION ORAL at 09:25

## 2023-11-06 RX ADMIN — MULTIPLE VITAMINS W/ MINERALS TAB 1 TABLET: TAB at 09:25

## 2023-11-06 RX ADMIN — HYDROCORTISONE: 25 CREAM TOPICAL at 09:26

## 2023-11-06 RX ADMIN — INSULIN ASPART 1 UNITS: 100 INJECTION, SOLUTION INTRAVENOUS; SUBCUTANEOUS at 09:24

## 2023-11-06 RX ADMIN — MICONAZOLE NITRATE: 20 POWDER TOPICAL at 09:25

## 2023-11-06 ASSESSMENT — ACTIVITIES OF DAILY LIVING (ADL)
ADLS_ACUITY_SCORE: 41
ADLS_ACUITY_SCORE: 43
ADLS_ACUITY_SCORE: 41
ADLS_ACUITY_SCORE: 41
ADLS_ACUITY_SCORE: 43

## 2023-11-06 NOTE — PLAN OF CARE
Goal Outcome Evaluation:       Pt alert and oriented x 4. Calm and cooperative with cares. Ate 100% of her dinner and consumed adequate amount of fluids.  and 204 coverage given. Pure wick used x 1  and incontinent of urine x 1. Wound dressing clean and intact. Pain managed by scheduled pain medications. Vs stable.          Problem: Adult Inpatient Plan of Care  Goal: Absence of Hospital-Acquired Illness or Injury  Intervention: Identify and Manage Fall Risk  Recent Flowsheet Documentation  Taken 11/5/2023 1814 by Marielle Toledo RN  Safety Promotion/Fall Prevention:   room door open   lighting adjusted   supervised activity  Intervention: Prevent Infection  Recent Flowsheet Documentation  Taken 11/5/2023 1814 by Marielle Toledo RN  Infection Prevention: hand hygiene promoted  Goal: Optimal Comfort and Wellbeing  Intervention: Monitor Pain and Promote Comfort  Recent Flowsheet Documentation  Taken 11/5/2023 1500 by Marielle Toledo RN  Pain Management Interventions: (pt stated had medication recently.) medication (see MAR)     Problem: Wound  Goal: Optimal Coping  Intervention: Support Patient and Family Response  Recent Flowsheet Documentation  Taken 11/5/2023 1814 by Marielle Toledo RN  Supportive Measures: active listening utilized  Goal: Optimal Functional Ability  Intervention: Optimize Functional Ability  Recent Flowsheet Documentation  Taken 11/5/2023 1814 by Marielle Toledo RN  Activity Management:   activity adjusted per tolerance   up to bedside commode  Goal: Absence of Infection Signs and Symptoms  Intervention: Prevent or Manage Infection  Recent Flowsheet Documentation  Taken 11/5/2023 1814 by Marielle Toledo RN  Isolation Precautions: contact precautions maintained  Goal: Optimal Pain Control and Function  Intervention: Prevent or Manage Pain  Recent Flowsheet Documentation  Taken 11/5/2023 1500 by Marielle Toledo RN  Pain Management Interventions: (pt stated had medication  recently.) medication (see MAR)  Goal: Skin Health and Integrity  Intervention: Optimize Skin Protection  Recent Flowsheet Documentation  Taken 11/5/2023 1814 by Marielle Toledo RN  Activity Management:   activity adjusted per tolerance   up to bedside commode     Problem: Anxiety Signs/Symptoms  Goal: Improved Mood Symptoms (Anxiety Signs/Symptoms)  Intervention: Optimize Emotion and Mood  Recent Flowsheet Documentation  Taken 11/5/2023 1814 by Marielle Toledo RN  Supportive Measures: active listening utilized  Goal: Enhanced Social, Occupational or Functional Skills (Anxiety Signs/Symptoms)  Intervention: Promote Social, Occupational and Functional Ability  Recent Flowsheet Documentation  Taken 11/5/2023 1814 by Marielle Toledo RN  Social Functional Ability Promotion: autonomy promoted     Problem: Pain Acute  Goal: Optimal Pain Control and Function  Intervention: Develop Pain Management Plan  Recent Flowsheet Documentation  Taken 11/5/2023 1500 by Marielle Toledo RN  Pain Management Interventions: (pt stated had medication recently.) medication (see MAR)  Intervention: Prevent or Manage Pain  Recent Flowsheet Documentation  Taken 11/5/2023 1814 by Marielle Toledo RN  Sensory Stimulation Regulation:   television on   care clustered  Bowel Elimination Promotion: adequate fluid intake promoted  Medication Review/Management: medications reviewed  Intervention: Optimize Psychosocial Wellbeing  Recent Flowsheet Documentation  Taken 11/5/2023 1814 by Marielle Toledo RN  Supportive Measures: active listening utilized

## 2023-11-06 NOTE — PROGRESS NOTES
11/02/23 9637   Appointment Info   Signing Clinician's Name / Credentials (PT) Eleuterio Scanlon   Rehab Comments (PT) L BKA, Room Air   Therapeutic Activity   Therapeutic Activities: dynamic activities to improve functional performance Minutes (44599) 15   Symptoms Noted During/After Treatment Fatigue   Treatment Detail/Skilled Intervention Pt. Mod I with bed mobility.  Pt. performing SB transfer to R and L both with setup assist and CGA.  Pt. needing cueing for placement of SB.   Wheelchair Managment/Training   Wheelchair Mgmt/Training Minutes (11405) 24   Symptoms Noted During/After Treatment Fatigue   Treatment Detail/Skilled Intervention Pt. self propelled w/c on tile floor SBA and on carpet needing occasional min A.  Pt. needing min A to manage using and elevator.  Pt. propelled self >500', pt. needing frequent rest breaks on carpet due to to UE fatigue   PT Discharge Planning   PT Plan LE strength and ROM, STS @ kathi bar, transfers, w/c mobility   PT Discharge Recommendation (DC Rec) Transitional Care Facility;home   PT Rationale for DC Rec Pt lives alone in an apartment and will not have 24/7 supervision however pt is motivated to return home with AD. Pt currently does not required any O2, feed tube, etc. however has poor to no sensation of R foot due to previous diagnosis of neuropathy.   PT Brief overview of current status Pt. working on w/c mobility.  Pt. needing occasional min A on carpet due to UE fatigue   Total Session Time   Timed Code Treatment Minutes 39   Total Session Time (sum of timed and untimed services) 39

## 2023-11-06 NOTE — PROGRESS NOTES
REHAB CARE PROGRESSION MEETING:    Medical Team Conference:  PT present for 10 minutes.  See progress noted dated today anoop by RN Care Coordinator for details. Stephanie Singh, PT and JESSICA Hendrickson were present for conference.

## 2023-11-06 NOTE — CARE CONFERENCE
"Care progression meeting from 2 - 245 pm, in patient's room, 4120.    Family present in room:  Patient and daughter, Lu     Staff:  Fide - RD; Livan - PT student and Jaqueline - PT; Dr Miguel Shannon and Steffanie - RN CC    RD:  Fide, Registered Dietician, shared that she is focused on proper nutrition with patient.  Patient is eating well and she knows a lot of nutrition and wound healing already.  Patient is encouraged to eat high protein foods.  She likes Glucerna and will try to drink daily.  She is also going to try to take the Expedite - which she doesn't particularly like.  Weight has been stable.  Patient is instructed to keep trying to keep carb choices under 60 gramps each meal.  Patient did mention that sometimes she has \"extra\" carb servings on her tray.  RD asked patient to let her know if that happens again.  Fide said that the kitchen is supposed to keep it to 60 grams and they will substitute food items if needed to lower carb count.    PT: Livan, Physical Therapy Student, shared that patient is doing well with bed mobility.  They have been working on transferring safely from bed to wheelchair and that the sliding board is the safest way to accomplish transfer now.  She did also mention that patient's feet need to be on the ground, not the wheelchair, when doing this transfer.  PT has also been working on patient's ability to stand at grab bar with moderate assist (50%). And also working on full extension of back and buttocks.  At some point may want/need to do a standing transfer, so working on upper body strength as well as arm strengthening.  Therapist noted that patient does lean toward the left while seated in the wheelchair.  Will continue to work to on strengthening and safe transfers.  PT is working to get patient a new light weight wheelchair with [employer].  Patient was assessed by [employer] earlier today.  PT has been working with patient 5-6 x per week.      OT has " signed off.    MD - Dr Miguel Shannon has been working with patient on establishing a bowel regimen for patient to clean her out and to prevent further constipation.  She will now be getting 4 senna tabs 2 x day to keep bowels moving.  Patient has been passing gas today and last BM was on Saturday 11/4/23.  Patient has orders for PRN bowels meds: patient is instructed by MD to ASK for PRNs as needed.    Dr Rivera explained how stool impaction is when the stool gets so big and hard that it can't pass through rectum.  This has been an ongoing problem for patient.  Patient will also be getting miralax daily, to help promote regular BM's.  MD instructed patient that if bowels are moving too much, then he will adjust the senna dose back down.   MD then discussed pain management strategies and reviewed patient's current pain regimen.  She is getting methadone 5 x day.  She is also getting PO dilaudid and IV dilaudid prior to daily wound cares.  Oral dose of dilaudid will be decreased to 2mg.  Patient reluctant to have changes made to her pain meds, but definitely doesn't want to take the dilaudid and methadone together - she was told by other MD that they should be  and that's what she wants to continue.  Dr Rivera did explain the actions of methadone and dilaudid - methadone is for long acting coverage and the dilaudid is the short acting pain meds. MD stated that he wants to manage her pain well, but is reluctant to change much with the methadone that she has been taking for a very long time.   The last thing MD discussed was glucose management - patient's blood sugars during the day have been rising a bit.  Current dose of tresiba at 12 units is less than she was taking at home.  He is going to start patient on novolog with lunch and supper, based on the number of carbs she is eating to get better control of her sugars.  Also discussed that it is a goal for patient to be off IV pain meds prior to going to TCU.   "    RN CC - This writer did discuss plan for TCU when patient is ready.  Patient got very tearful when this writer mentions the word \"amputation\" when discussing all that patient has gone through in the last couple months.  Daughter said that this word is a \"trigger\" for patient.  This writer did apologize - didn't mean to upset patient, but was trying to offer support to patient who has been through a lot.  She is anxious about going to TCU.  Will discuss further as we get a bit closer to discharge.   Patient said that she does not want to go back to [facility] - she said if was terrible.      Steffanie Barrera, RN, BSN  Care Coordination  [facility]  [phone]        "

## 2023-11-06 NOTE — PROGRESS NOTES
11/04/23 1310   Appointment Info   Signing Clinician's Name / Credentials (PT) Shama Henry, PT, DPT   Rehab Comments (PT) L BKA, Room Air   Therapeutic Activity   Therapeutic Activities: dynamic activities to improve functional performance Minutes (87547) 10   Symptoms Noted During/After Treatment Fatigue   Treatment Detail/Skilled Intervention Pt Crista with bed mobility and SB transfer to wheelchair. Assist req'd for w/c positioning and placing L armrest. Pt returned to room and stayed in w/c post-tx   Wheelchair Managment/Training   Wheelchair Mgmt/Training Minutes (67718) 25   Symptoms Noted During/After Treatment Fatigue   Treatment Detail/Skilled Intervention Patient self-propelled wheelchair >500ft around unit and downstairs outside the hospital on uneven surfaces. Pt needed min/mod help for propulsion over large cracks in cement. When navigating the elevator, pt would reach for R railing to pull her instead of pushing the wheelchair.   PT Discharge Planning   PT Plan LE strength and ROM, STS @ kathi bar, transfers, w/c mobility   PT Discharge Recommendation (DC Rec) Transitional Care Facility;home   PT Rationale for DC Rec Pt lives alone in an apartment and will not have 24/7 supervision however pt is motivated to return home with AD. Pt currently does not required any O2, feed tube, etc. however has poor to no sensation of R foot due to previous diagnosis of neuropathy.   PT Brief overview of current status Pt demos good ability to navigate wheelchair, but will continue to benefit from further w/c management training as she makes wide turns. Pt req'd min-modA for uneven surfaces due to strength deficits and fatigue and will benefit from continued PT to address these impairments.   Total Session Time   Timed Code Treatment Minutes 35   Total Session Time (sum of timed and untimed services) 35

## 2023-11-06 NOTE — PROGRESS NOTES
[facility]    Medicine Progress Note - Hospitalist Service    Date of Admission:  10/24/2023    Hospital Course  Linda Loredo is a 78y F PMHx IDDM, chronic venous stasis, chronic pain syndrome, afib, HTN, HLD, CKD3, BERLIN, and FTT who presents to [facility] for ongoing wound care and therapies. Pt initially presented 9/23 to [facility] after welfare check by police. She was found to have L leg swelling and edema and was diagnosed with polymicrobial osteomyelitis. She is s/p debridement by podiatry on 9/26. Bcx negative at the time. ALMA with poor flow and pt was transferred to [facility] for vascular surgery evaluation. Pt managed with meropenem and vancomycin at the time for osteo. 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 Vascular surgery was unable to close the stump at the time and elected for ongoing healing prior to surgical closure thus she was discharged to [facility] for ongoing wound care and therapy.      [facility]  10/26-10/30: Bowel regimen uptitrated given pt was impacted on arrival and did not completely empty after initial BM. Good BM by end of weekend. BM 10/29; patient not agreeable to increasing scheduled bowel regimen at this time. RLE with redness below knee, low suspicion of infection, monitoring.   10/31 - 11/5.  Progressing well.  We have had difficulty with constipation.  Has been refusing suppository but now seems agreeable to cathartics and stool softeners.  Has been having minimal bowel movements.  11/5 itchy rash noted on both hands most likely an allergic reaction.  Plan for hydrocortisone cream.    11/6: further constipation last week, increase senna to 4Q BID and encourage MoM usage. Start I:C ratio 1:15 with lunch and dinner. Decrease dilaudid to 2mg PO.     Assessment & Plan   Skin lesion, right lower extremity, Acute  -Improving  -Bactroban cream, apply twice daily to affected areas.    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)  S/p Tibial/Fibular Resection and Debridement of Stump (10/17)  Chronic Venous Stasis  - Completed course of meropenem/vancomycin 10/8 for osteomyelitis  - Vascular surgery planning to close in the future, monthly clinic visits for wound checks with closure at unspecified time in the future  - Dilaudid 2mg PO and 0.3mg IV prn pain control (PO 30min prior and IV 15min prior to dressing changes)  - WOC consulted and following  - PT/OT    Constipation   - manual disimpaction (10/25)  - dulcolax 10mg x1, later 20mg x1 (10/25)  - fleet enema (10/25)  - increase to doc-senna 4Q BID (11/6)  - miralax BID (10/26)  - MoM TID prn  - Viscous lidocaine for anal pain    Acute on Chronic Pain Syndrome  - methadone 5mg TID and 10mg @ 1500  - Dilaudid IV/PO prn pain and with dressing changes  - previous Psych eval PTA, no associated depression    IDDM  - Tresiba 12u at bedtime  - start 1:15 insulin:carb ratio with lunch/dinner  - ISS  - Monitor blood glucose with Accu-Cheks and adjust insulin accordingly    Afib  HTN  HLD  -Rate controlled at this time.  -Warfarin, pharmacy to dose  -Continue with statin  -Holding PTA lisinopril 5mg per pt wishes, can restart after discharge    CKD3.  Stable at this time  - baseline Cr 1-1.2  - holding lisinopril  -Monitor with BMP    FTT  - nutrition consult  - SW following    BERLIN  - refusing CPAP      Diet: Consistent Carbohydrate Diet Moderate Consistent Carb (60 g CHO per Meal) Diet  Snacks/Supplements Adult: Glucerna; Between Meals  Snacks/Supplements Adult: Expedite Bottle; With Meals    DVT Prophylaxis: Warfarin  Braga Catheter: Not present  Lines: PRESENT      PICC 10/09/23 Triple Lumen Right Basilic-Site Assessment: WDL      Cardiac Monitoring: None  Code Status: Full Code      Clinically Significant Risk  Factors              # Hypoalbuminemia: Lowest albumin = 3.2 g/dL at 11/1/2023  6:13 AM, will monitor as appropriate     # Hypertension: Noted on problem list       # DMII: A1C = 7.0 % (Ref range: <5.7 %) within past 6 months              Disposition Plan      Expected Discharge Date: 11/10/2023    Discharge Delays: Complex Discharge  Destination: inpatient rehabilitation facility  Discharge Comments: Wound Care-complex. Pain management.            Miguel Shannon MD  Hospitalist Service  [facility]  Securely message with [employer] (more info)  Text page via [employer] Paging/Directory   ______________________________________________________________________    Interval History   - no acute events ovn  - Care Conference today  - worsening constipation, agreeable to increasing bowel regimen today  - discussed lowering dilaudid dose, agreeable to 2mg PO dose  - discussed starting I:C ratio with lunch/dinner, pt agreeable  - no other acute concerns    Physical Exam   Vital Signs: Temp: 97.8  F (36.6  C) Temp src: Oral BP: 135/65 Pulse: 77   Resp: 20 SpO2: 99 % O2 Device: None (Room air)    Weight: 237 lbs 14.4 oz  Gen: She is a morbidly obese female lying in bed comfortably no distress  HEET: Head is normocephalic atraumatic eyes pupils appear equal round and reactive to light  Heart: She has a good S1 and S2 no obvious murmurs, no JVD   Lungs: She has a normal respiratory effort on auscultation good air entry is noted.   Abdomen: Soft nontender nondistended bowel sounds are noted  Musculoskeletal: She has good muscle tone, below the left knee amputation noted  Skin: Crusted skin lesion on right leg, previous surrounding areas of erythema have resolved.  Erythematous macules noted on both hands.  Please refer to nursing/wound care note for complete skin exam.    Medical Decision Making       40 MINUTES SPENT BY ME on the date of service doing chart review, history, exam, documentation & further activities per the note.      Data    Lab Results   Component Value Date    WBC 6.4 11/01/2023    WBC 9.4 02/22/2008     Lab Results   Component Value Date    RBC 3.04 11/01/2023    RBC 4.19 02/22/2008     Lab Results   Component Value Date    HGB 9.0 11/01/2023    HGB 12.6 02/22/2008     Lab Results   Component Value Date    HCT 29.3 11/01/2023    HCT 35.6 02/22/2008     Lab Results   Component Value Date    MCV 96 11/01/2023    MCV 85 02/22/2008     Lab Results   Component Value Date    MCH 29.6 11/01/2023    MCH 30.1 02/22/2008     Lab Results   Component Value Date    MCHC 30.7 11/01/2023    MCHC 35.4 02/22/2008     Lab Results   Component Value Date    RDW 14.6 11/01/2023    RDW Test not performed 02/22/2008     Lab Results   Component Value Date     11/01/2023     02/22/2008       Last Comprehensive Metabolic Panel:  Sodium   Date Value Ref Range Status   11/01/2023 140 135 - 145 mmol/L Final     Comment:     Reference intervals for this test were updated on 09/26/2023 to more accurately reflect our healthy population. There may be differences in the flagging of prior results with similar values performed with this method. Interpretation of those prior results can be made in the context of the updated reference intervals.    02/22/2008 140 133 - 144 mmol/L Final     Potassium   Date Value Ref Range Status   11/01/2023 4.4 3.4 - 5.3 mmol/L Final   06/28/2022 4.3 3.5 - 5.0 mmol/L Final   02/22/2008 4.5 3.4 - 5.3 mmol/L Final     Chloride   Date Value Ref Range Status   11/01/2023 100 98 - 107 mmol/L Final   06/28/2022 97 (L) 98 - 107 mmol/L Final   02/22/2008 100 94 - 109 mmol/L Final     Carbon Dioxide   Date Value Ref Range Status   02/22/2008 28 20 - 32 mmol/L Final     Carbon Dioxide (CO2)   Date Value Ref Range Status   11/01/2023 29 22 - 29 mmol/L Final   06/28/2022 29 22 - 31 mmol/L Final     Anion Gap   Date Value Ref Range Status   11/01/2023 11 7 - 15 mmol/L Final   06/28/2022 13 5 - 18 mmol/L Final   02/22/2008 11 6 - 17  mmol/L Final     Glucose   Date Value Ref Range Status   06/28/2022 73 70 - 125 mg/dL Final   02/22/2008 298 (H) 60 - 99 mg/dL Final     GLUCOSE BY METER POCT   Date Value Ref Range Status   11/06/2023 155 (H) 70 - 99 mg/dL Final     Urea Nitrogen   Date Value Ref Range Status   11/01/2023 18.0 8.0 - 23.0 mg/dL Final   06/28/2022 42 (H) 8 - 28 mg/dL Final   02/22/2008 15 7 - 30 mg/dL Final     Creatinine   Date Value Ref Range Status   11/01/2023 0.96 (H) 0.51 - 0.95 mg/dL Final   02/22/2008 0.82 0.60 - 1.30 mg/dL Final     GFR Estimate   Date Value Ref Range Status   11/01/2023 60 (L) >60 mL/min/1.73m2 Final   02/22/2008 75 >60 mL/min/1.7m2 Final     Calcium   Date Value Ref Range Status   11/01/2023 9.8 8.8 - 10.2 mg/dL Final   02/22/2008 10.4 8.5 - 10.4 mg/dL Final     Bilirubin Total   Date Value Ref Range Status   11/01/2023 0.2 <=1.2 mg/dL Final   02/22/2008 0.4 0.2 - 1.3 mg/dL Final     Alkaline Phosphatase   Date Value Ref Range Status   11/01/2023 97 35 - 104 U/L Final   02/22/2008 138 40 - 150 U/L Final     ALT   Date Value Ref Range Status   11/01/2023 12 0 - 50 U/L Final     Comment:     Reference intervals for this test were updated on 6/12/2023 to more accurately reflect our healthy population. There may be differences in the flagging of prior results with similar values performed with this method. Interpretation of those prior results can be made in the context of the updated reference intervals.     02/22/2008 28 0 - 50 U/L Final     AST   Date Value Ref Range Status   11/01/2023 19 0 - 45 U/L Final     Comment:     Reference intervals for this test were updated on 6/12/2023 to more accurately reflect our healthy population. There may be differences in the flagging of prior results with similar values performed with this method. Interpretation of those prior results can be made in the context of the updated reference intervals.   02/22/2008 26 0 - 45 U/L Final           I have personally reviewed the  following data over the past 24 hrs:    INR:  1.66 (H) PTT:  N/A   D-dimer:  N/A Fibrinogen:  N/A       Imaging results reviewed over the past 24 hrs:   No results found for this or any previous visit (from the past 24 hour(s)).

## 2023-11-06 NOTE — PLAN OF CARE
Problem: Adult Inpatient Plan of Care  Goal: Absence of Hospital-Acquired Illness or Injury  Outcome: Progressing  Intervention: Identify and Manage Fall Risk  Recent Flowsheet Documentation  Taken 11/6/2023 0307 by Gris Carpenter RN  Safety Promotion/Fall Prevention:   assistive device/personal items within reach   room near nurse's station   room door open   safety round/check completed  Intervention: Prevent Skin Injury  Recent Flowsheet Documentation  Taken 11/6/2023 0610 by Gris Carpenter RN  Body Position: position changed independently  Intervention: Prevent Infection  Recent Flowsheet Documentation  Taken 11/6/2023 0307 by Gris Carpenter, RN  Infection Prevention:   cohorting utilized   hand hygiene promoted   personal protective equipment utilized   rest/sleep promoted   single patient room provided     Problem: Adult Inpatient Plan of Care  Goal: Optimal Comfort and Wellbeing  Outcome: Progressing   Goal Outcome Evaluation:  Patient slept about 90% of the shift; used the purewick with urge to void.  Patient pleasant and cooperative with cares.  Blood draw done for INR

## 2023-11-06 NOTE — PROGRESS NOTES
"   11/03/23 1410   Appointment Info   Signing Clinician's Name / Credentials (PT) Eleuterio Scanlon, PT   Rehab Comments (PT) L BKA, Room Air   Therapeutic Activity   Therapeutic Activities: dynamic activities to improve functional performance Minutes (48805) 25   Symptoms Noted During/After Treatment Fatigue   Treatment Detail/Skilled Intervention Pt. performing supine to sit and scooting to EOB Mod I.  Pt. performed SB transfer from bed to w/c (R) with SBA.  Pt. was able to place and remove SB without physical assist.  Pt. performing 5x sit to stands at kathi bar on 4\" box from w/c.  Pt. performed 3x with max A of 1, writer blocking L knee.  Then performed mod A of 2.  All stands pt. partially flexed at hips and knee but significantly more so with Ax1.   Wheelchair Managment/Training   Wheelchair Mgmt/Training Minutes (69592) 10   Symptoms Noted During/After Treatment Fatigue   Treatment Detail/Skilled Intervention Patient self-propelled manual wheelchair to/from therapy gym with SBA. Patient has a tendency to deviate R and reach for railing along wall to compensate.   PT Discharge Planning   PT Plan LE strength and ROM, STS @ kathi bar, transfers, w/c mobility   PT Discharge Recommendation (DC Rec) Transitional Care Facility;home   PT Rationale for DC Rec Pt lives alone in an apartment and will not have 24/7 supervision however pt is motivated to return home with AD. Pt currently does not required any O2, feed tube, etc. however has poor to no sensation of R foot due to previous diagnosis of neuropathy.   PT Brief overview of current status Pt. demo'd improved safety with SB transfer with better positioning/placement of SB.   Total Session Time   Timed Code Treatment Minutes 35   Total Session Time (sum of timed and untimed services) 35       "

## 2023-11-06 NOTE — PLAN OF CARE
Problem: Pain Acute  Goal: Optimal Pain Control and Function  Outcome: Progressing   Goal Outcome Evaluation:         Alert and oriented X4, pain managed by scheduled Methadone, VS stable, , 175, no BM, good appetite, refused dressing change due to care plan meeting and conflict with scheduled methadone.  Family at bedside.  Denita East RN

## 2023-11-07 ENCOUNTER — APPOINTMENT (OUTPATIENT)
Dept: PHYSICAL THERAPY | Facility: CLINIC | Age: 78
DRG: 560 | End: 2023-11-07
Attending: INTERNAL MEDICINE
Payer: COMMERCIAL

## 2023-11-07 LAB
GLUCOSE BLDC GLUCOMTR-MCNC: 113 MG/DL (ref 70–99)
GLUCOSE BLDC GLUCOMTR-MCNC: 143 MG/DL (ref 70–99)
GLUCOSE BLDC GLUCOMTR-MCNC: 160 MG/DL (ref 70–99)
GLUCOSE BLDC GLUCOMTR-MCNC: 183 MG/DL (ref 70–99)
INR PPP: 2.19 (ref 0.85–1.15)

## 2023-11-07 PROCEDURE — 250N000011 HC RX IP 250 OP 636: Performed by: STUDENT IN AN ORGANIZED HEALTH CARE EDUCATION/TRAINING PROGRAM

## 2023-11-07 PROCEDURE — 250N000013 HC RX MED GY IP 250 OP 250 PS 637: Performed by: STUDENT IN AN ORGANIZED HEALTH CARE EDUCATION/TRAINING PROGRAM

## 2023-11-07 PROCEDURE — 120N000017 HC R&B RESPIRATORY CARE

## 2023-11-07 PROCEDURE — 85610 PROTHROMBIN TIME: CPT | Performed by: STUDENT IN AN ORGANIZED HEALTH CARE EDUCATION/TRAINING PROGRAM

## 2023-11-07 PROCEDURE — 97542 WHEELCHAIR MNGMENT TRAINING: CPT | Mod: GP | Performed by: PHYSICAL THERAPIST

## 2023-11-07 PROCEDURE — 97530 THERAPEUTIC ACTIVITIES: CPT | Mod: GP | Performed by: PHYSICAL THERAPIST

## 2023-11-07 PROCEDURE — 99232 SBSQ HOSP IP/OBS MODERATE 35: CPT | Performed by: STUDENT IN AN ORGANIZED HEALTH CARE EDUCATION/TRAINING PROGRAM

## 2023-11-07 RX ORDER — WARFARIN SODIUM 1 MG/1
1 TABLET ORAL
Status: COMPLETED | OUTPATIENT
Start: 2023-11-07 | End: 2023-11-07

## 2023-11-07 RX ADMIN — POLYETHYLENE GLYCOL 3350 17 G: 17 POWDER, FOR SOLUTION ORAL at 20:52

## 2023-11-07 RX ADMIN — MICONAZOLE NITRATE: 20 POWDER TOPICAL at 20:54

## 2023-11-07 RX ADMIN — MULTIPLE VITAMINS W/ MINERALS TAB 1 TABLET: TAB at 08:39

## 2023-11-07 RX ADMIN — METHADONE HYDROCHLORIDE 5 MG: 5 TABLET ORAL at 20:52

## 2023-11-07 RX ADMIN — MUPIROCIN: 20 OINTMENT TOPICAL at 08:40

## 2023-11-07 RX ADMIN — MAGNESIUM HYDROXIDE 30 ML: 2400 SUSPENSION ORAL at 11:16

## 2023-11-07 RX ADMIN — HYDROCORTISONE: 25 CREAM TOPICAL at 08:40

## 2023-11-07 RX ADMIN — METHADONE HYDROCHLORIDE 10 MG: 10 TABLET ORAL at 16:29

## 2023-11-07 RX ADMIN — ATORVASTATIN CALCIUM 40 MG: 40 TABLET, FILM COATED ORAL at 20:53

## 2023-11-07 RX ADMIN — DOCUSATE SODIUM AND SENNOSIDES 4 TABLET: 8.6; 5 TABLET, FILM COATED ORAL at 08:39

## 2023-11-07 RX ADMIN — POLYETHYLENE GLYCOL 3350 17 G: 17 POWDER, FOR SOLUTION ORAL at 08:39

## 2023-11-07 RX ADMIN — MICONAZOLE NITRATE: 20 POWDER TOPICAL at 08:40

## 2023-11-07 RX ADMIN — TORSEMIDE 15 MG: 10 TABLET ORAL at 08:39

## 2023-11-07 RX ADMIN — INSULIN ASPART 1 UNITS: 100 INJECTION, SOLUTION INTRAVENOUS; SUBCUTANEOUS at 18:04

## 2023-11-07 RX ADMIN — DOCUSATE SODIUM AND SENNOSIDES 4 TABLET: 8.6; 5 TABLET, FILM COATED ORAL at 20:52

## 2023-11-07 RX ADMIN — HYDROMORPHONE HYDROCHLORIDE 2 MG: 2 TABLET ORAL at 10:39

## 2023-11-07 RX ADMIN — ASPIRIN 81 MG: 81 TABLET, COATED ORAL at 08:39

## 2023-11-07 RX ADMIN — HYDROCORTISONE: 25 CREAM TOPICAL at 20:53

## 2023-11-07 RX ADMIN — INSULIN DEGLUDEC INJECTION 12 UNITS: 100 INJECTION, SOLUTION SUBCUTANEOUS at 20:55

## 2023-11-07 RX ADMIN — INSULIN ASPART 1 UNITS: 100 INJECTION, SOLUTION INTRAVENOUS; SUBCUTANEOUS at 12:39

## 2023-11-07 RX ADMIN — METHADONE HYDROCHLORIDE 5 MG: 5 TABLET ORAL at 08:39

## 2023-11-07 RX ADMIN — WARFARIN SODIUM 1 MG: 1 TABLET ORAL at 18:03

## 2023-11-07 RX ADMIN — HYDROMORPHONE HYDROCHLORIDE 0.3 MG: 1 INJECTION, SOLUTION INTRAMUSCULAR; INTRAVENOUS; SUBCUTANEOUS at 11:15

## 2023-11-07 RX ADMIN — MUPIROCIN: 20 OINTMENT TOPICAL at 20:53

## 2023-11-07 RX ADMIN — METHADONE HYDROCHLORIDE 5 MG: 5 TABLET ORAL at 13:35

## 2023-11-07 ASSESSMENT — ACTIVITIES OF DAILY LIVING (ADL)
ADLS_ACUITY_SCORE: 43

## 2023-11-07 NOTE — PLAN OF CARE
Problem: Wound  Goal: Optimal Coping  Outcome: Progressing  Intervention: Support Patient and Family Response  Recent Flowsheet Documentation  Taken 11/6/2023 1600 by Ev Lopez RN  Supportive Measures: active listening utilized     Problem: Pain Acute  Goal: Optimal Pain Control and Function  Outcome: Progressing  Intervention: Develop Pain Management Plan  Recent Flowsheet Documentation  Taken 11/6/2023 1557 by Ev Lopez RN  Pain Management Interventions: medication (see MAR)  Intervention: Prevent or Manage Pain  Recent Flowsheet Documentation  Taken 11/6/2023 1600 by Ev Lopez RN  Medication Review/Management: medications reviewed  Intervention: Optimize Psychosocial Wellbeing  Recent Flowsheet Documentation  Taken 11/6/2023 1600 by Ev Lopez RN  Supportive Measures: active listening utilized     Problem: Adult Inpatient Plan of Care  Goal: Absence of Hospital-Acquired Illness or Injury  Intervention: Identify and Manage Fall Risk  Recent Flowsheet Documentation  Taken 11/6/2023 1600 by Ev Lopez RN  Safety Promotion/Fall Prevention: clutter free environment maintained  Goal: Optimal Comfort and Wellbeing  Intervention: Monitor Pain and Promote Comfort  Recent Flowsheet Documentation  Taken 11/6/2023 1557 by Ev Lopez RN  Pain Management Interventions: medication (see MAR)     Problem: Wound  Goal: Optimal Pain Control and Function  Intervention: Prevent or Manage Pain  Recent Flowsheet Documentation  Taken 11/6/2023 1557 by Ev Lopez RN  Pain Management Interventions: medication (see MAR)     Problem: Anxiety Signs/Symptoms  Goal: Improved Mood Symptoms (Anxiety Signs/Symptoms)  Intervention: Optimize Emotion and Mood  Recent Flowsheet Documentation  Taken 11/6/2023 1600 by Ev Lopez RN  Supportive Measures: active listening utilized   Goal Outcome Evaluation:  Patient BKA wound dressing  done, prior to that, prn medications given per orders. Patient ate 100% of dinner with adequate fluids intake. Plan of care continues.

## 2023-11-07 NOTE — PLAN OF CARE
Problem: Adult Inpatient Plan of Care  Goal: Absence of Hospital-Acquired Illness or Injury  Intervention: Identify and Manage Fall Risk  Recent Flowsheet Documentation  Taken 11/7/2023 1057 by Gila Robins RN  Safety Promotion/Fall Prevention: activity supervised  Intervention: Prevent Infection  Recent Flowsheet Documentation  Taken 11/7/2023 1057 by Gila Robins RN  Infection Prevention: hand hygiene promoted   Goal Outcome Evaluation:         Problem: Wound  Goal: Optimal Coping  Outcome: Progressing  Intervention: Support Patient and Family Response  Recent Flowsheet Documentation  Taken 11/7/2023 1057 by Gila Robins RN  Supportive Measures: active listening utilized     Patient alert and cooperative; pleasant on approach. Medication and care compliant. Given prn pain medication prior to wound dressing change; helpful. Small green colored stitch noted to wound bed. WOC RN informed and assessed. Ate well at meals; good fluid intake. PRN milk of mag for constipation. Uneventful shift. Plan of care ongoing. /69 (BP Location: Left arm, Patient Position: Supine)   Pulse 91   Temp 98.1  F (36.7  C) (Oral)   Resp 20   Wt 107.9 kg (237 lb 14.4 oz)   SpO2 96%   BMI 38.40 kg/m     Gila Robins RN

## 2023-11-07 NOTE — PLAN OF CARE
Problem: Anxiety Signs/Symptoms  Goal: Improved Mood Symptoms (Anxiety Signs/Symptoms)  Outcome: Progressing     Problem: Pain Acute  Goal: Optimal Pain Control and Function  Outcome: Progressing   Goal Outcome Evaluation:         Pt alert and oriented X4. Able to make her  needs known. BF at  mg/dl. No coverage given. Pt appears comfortable in bed watching TV. Complained of pain  on her LE but received her schedule pain medication. Will continue plan of cares.

## 2023-11-07 NOTE — PLAN OF CARE
Problem: Adult Inpatient Plan of Care  Goal: Absence of Hospital-Acquired Illness or Injury  Outcome: Progressing  Intervention: Identify and Manage Fall Risk  Recent Flowsheet Documentation  Taken 11/7/2023 0200 by Gris Carpenter RN  Safety Promotion/Fall Prevention:   assistive device/personal items within reach   room near nurse's station   room door open   safety round/check completed  Intervention: Prevent Skin Injury  Recent Flowsheet Documentation  Taken 11/7/2023 0617 by Gris Carpenter RN  Device Skin Pressure Protection: (uses interdry on abdominal folds) other (see comments)  Intervention: Prevent Infection  Recent Flowsheet Documentation  Taken 11/7/2023 0200 by Gris Carpenter RN  Infection Prevention:   cohorting utilized   hand hygiene promoted   personal protective equipment utilized   rest/sleep promoted   single patient room provided     Problem: Adult Inpatient Plan of Care  Goal: Optimal Comfort and Wellbeing  Outcome: Progressing     Problem: Pain Acute  Goal: Optimal Pain Control and Function  Intervention: Prevent or Manage Pain  Recent Flowsheet Documentation  Taken 11/7/2023 0200 by Gris Carpenter RN  Sensory Stimulation Regulation: (per patient's preferences) television on  Medication Review/Management: medications reviewed  Intervention: Optimize Psychosocial Wellbeing  Recent Flowsheet Documentation  Taken 11/7/2023 0200 by Gris Carpenter RN  Supportive Measures: active listening utilized   Goal Outcome Evaluation:  Patient cooperative with cares; called once this shift for incontinent urine.  Dressings on buttock and back of thigh wet with urine; dressing change done.  Blood draw done INR check.  Patient is pleasant, writing on her notebook cares done to her; slept well this shift.

## 2023-11-07 NOTE — PROGRESS NOTES
[facility]    Medicine Progress Note - Hospitalist Service    Date of Admission:  10/24/2023    Hospital Course  Linda Loredo is a 78y F PMHx IDDM, chronic venous stasis, chronic pain syndrome, afib, HTN, HLD, CKD3, BERLIN, and FTT who presents to [facility] for ongoing wound care and therapies. Pt initially presented 9/23 to [facility] after welfare check by police. She was found to have L leg swelling and edema and was diagnosed with polymicrobial osteomyelitis. She is s/p debridement by podiatry on 9/26. Bcx negative at the time. ALMA with poor flow and pt was transferred to [facility] for vascular surgery evaluation. Pt managed with meropenem and vancomycin at the time for osteo. 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 Vascular surgery was unable to close the stump at the time and elected for ongoing healing prior to surgical closure thus she was discharged to [facility] for ongoing wound care and therapy.      [facility] Course  10/26-10/30: Bowel regimen uptitrated given pt was impacted on arrival and did not completely empty after initial BM. Good BM by end of weekend. BM 10/29; patient not agreeable to increasing scheduled bowel regimen at this time. RLE with redness below knee, low suspicion of infection, monitoring.   10/31 - 11/5.  Progressing well.  We have had difficulty with constipation.  Has been refusing suppository but now seems agreeable to cathartics and stool softeners.  Has been having minimal bowel movements.  11/5 itchy rash noted on both hands most likely an allergic reaction.  Plan for hydrocortisone cream.    11/6: further constipation last week, increase senna to 4Q BID and encourage MoM usage. Start I:C ratio 1:15 with lunch and dinner. Decrease dilaudid to 2mg PO.   11/7: No BM yet, pt taking her 4 senna BID but possibly not her miralax. She will take 1-2 doses of MoM today. Keep dilaudid stable dose today/tomorrow, plan to decrease Thursday.  Monitor BG, improved with I:C ratio, further adjustments possible later this week as indicated. Rash on arms/hands improving.    Assessment & Plan   Constipation   - doc-senna 4Q BID (11/6)  - miralax BID (10/26)  - MoM TID prn  - Viscous lidocaine for anal pain  - escalate QD to QOD until pt has 2-3 significant BM or reaches loose/watery consistency as suspect pt has significant stool burden    Skin lesion, right lower extremity, Acute  -Improving  -Bactroban cream, apply twice daily to affected areas.    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)  S/p Tibial/Fibular Resection and Debridement of Stump (10/17)  Chronic Venous Stasis  - Completed course of meropenem/vancomycin 10/8 for osteomyelitis  - Vascular surgery planning to close in the future, monthly clinic visits for wound checks with closure at unspecified time in the future  - Dilaudid 2mg PO and 0.3mg IV prn pain control (PO 30min prior and IV 15min prior to dressing changes)  - WOC consulted and following  - PT/OT    Acute on Chronic Pain Syndrome  - methadone 5mg TID and 10mg @ 1500  - dilaudid 2mg PO and 0.3mg IV - plan to drop IV dose on 11/9  - previous Psych eval PTA, no associated depression    IDDM  - Tresiba 12u at bedtime  - 1:15 insulin:carb ratio with lunch/dinner  - ISS  - Monitor blood glucose with Accu-Cheks and adjust insulin accordingly    Afib  HTN  HLD  -Rate controlled at this time.  -Warfarin, pharmacy to dose  -Continue with statin  -Holding PTA lisinopril 5mg per pt wishes, can restart after discharge    CKD3.  Stable at this time  - baseline Cr 1-1.2  - holding lisinopril  -Monitor with BMP    FTT  - nutrition consult  - SW following    BERLIN  - refusing CPAP      Diet:  Consistent Carbohydrate Diet Moderate Consistent Carb (60 g CHO per Meal) Diet  Snacks/Supplements Adult: Glucerna; Between Meals  Snacks/Supplements Adult: Expedite Bottle; With Meals    DVT Prophylaxis: Warfarin  Braga Catheter: Not present  Lines: PRESENT      PICC 10/09/23 Triple Lumen Right Basilic-Site Assessment: WDL      Cardiac Monitoring: None  Code Status: Full Code      Clinically Significant Risk Factors              # Hypoalbuminemia: Lowest albumin = 3.2 g/dL at 11/1/2023  6:13 AM, will monitor as appropriate     # Hypertension: Noted on problem list       # DMII: A1C = 7.0 % (Ref range: <5.7 %) within past 6 months              Disposition Plan     Expected Discharge Date: 11/10/2023    Discharge Delays: Complex Discharge  Destination: inpatient rehabilitation facility  Discharge Comments: Wound Care-complex. Pain management.            Miguel Shannon MD  Hospitalist Service  [facility]  Securely message with [employer] (more info)  Text page via [employer] Paging/Directory   ______________________________________________________________________    Interval History   - no acute events ovn  - no BM yet  - agreeable to MoM today  - discussed improvement in BG, will monitor further today  - discussed keeping dilaudid dosing stable today/tomorrow  - no other acute concerns    Physical Exam   Vital Signs: Temp: 97.7  F (36.5  C) Temp src: Oral BP: 135/72 Pulse: 70   Resp: 20 SpO2: 100 % O2 Device: None (Room air)    Weight: 237 lbs 14.4 oz  Gen: She is a morbidly obese female lying in bed comfortably no distress  HEET: Head is normocephalic atraumatic eyes pupils appear equal round and reactive to light  Heart: She has a good S1 and S2 no obvious murmurs, no JVD   Lungs: She has a normal respiratory effort on auscultation good air entry is noted.   Abdomen: Soft nontender nondistended bowel sounds are noted  Musculoskeletal: She has good muscle tone, below the left knee amputation noted  Skin: Crusted skin lesion on  right leg, previous surrounding areas of erythema have resolved.  Erythematous macules noted on both hands.  Please refer to nursing/wound care note for complete skin exam.    Medical Decision Making       40 MINUTES SPENT BY ME on the date of service doing chart review, history, exam, documentation & further activities per the note.      Data   Lab Results   Component Value Date    WBC 6.4 11/01/2023    WBC 9.4 02/22/2008     Lab Results   Component Value Date    RBC 3.04 11/01/2023    RBC 4.19 02/22/2008     Lab Results   Component Value Date    HGB 9.0 11/01/2023    HGB 12.6 02/22/2008     Lab Results   Component Value Date    HCT 29.3 11/01/2023    HCT 35.6 02/22/2008     Lab Results   Component Value Date    MCV 96 11/01/2023    MCV 85 02/22/2008     Lab Results   Component Value Date    MCH 29.6 11/01/2023    MCH 30.1 02/22/2008     Lab Results   Component Value Date    MCHC 30.7 11/01/2023    MCHC 35.4 02/22/2008     Lab Results   Component Value Date    RDW 14.6 11/01/2023    RDW Test not performed 02/22/2008     Lab Results   Component Value Date     11/01/2023     02/22/2008       Last Comprehensive Metabolic Panel:  Sodium   Date Value Ref Range Status   11/01/2023 140 135 - 145 mmol/L Final     Comment:     Reference intervals for this test were updated on 09/26/2023 to more accurately reflect our healthy population. There may be differences in the flagging of prior results with similar values performed with this method. Interpretation of those prior results can be made in the context of the updated reference intervals.    02/22/2008 140 133 - 144 mmol/L Final     Potassium   Date Value Ref Range Status   11/01/2023 4.4 3.4 - 5.3 mmol/L Final   06/28/2022 4.3 3.5 - 5.0 mmol/L Final   02/22/2008 4.5 3.4 - 5.3 mmol/L Final     Chloride   Date Value Ref Range Status   11/01/2023 100 98 - 107 mmol/L Final   06/28/2022 97 (L) 98 - 107 mmol/L Final   02/22/2008 100 94 - 109 mmol/L Final      Carbon Dioxide   Date Value Ref Range Status   02/22/2008 28 20 - 32 mmol/L Final     Carbon Dioxide (CO2)   Date Value Ref Range Status   11/01/2023 29 22 - 29 mmol/L Final   06/28/2022 29 22 - 31 mmol/L Final     Anion Gap   Date Value Ref Range Status   11/01/2023 11 7 - 15 mmol/L Final   06/28/2022 13 5 - 18 mmol/L Final   02/22/2008 11 6 - 17 mmol/L Final     Glucose   Date Value Ref Range Status   06/28/2022 73 70 - 125 mg/dL Final   02/22/2008 298 (H) 60 - 99 mg/dL Final     GLUCOSE BY METER POCT   Date Value Ref Range Status   11/06/2023 174 (H) 70 - 99 mg/dL Final     Urea Nitrogen   Date Value Ref Range Status   11/01/2023 18.0 8.0 - 23.0 mg/dL Final   06/28/2022 42 (H) 8 - 28 mg/dL Final   02/22/2008 15 7 - 30 mg/dL Final     Creatinine   Date Value Ref Range Status   11/01/2023 0.96 (H) 0.51 - 0.95 mg/dL Final   02/22/2008 0.82 0.60 - 1.30 mg/dL Final     GFR Estimate   Date Value Ref Range Status   11/01/2023 60 (L) >60 mL/min/1.73m2 Final   02/22/2008 75 >60 mL/min/1.7m2 Final     Calcium   Date Value Ref Range Status   11/01/2023 9.8 8.8 - 10.2 mg/dL Final   02/22/2008 10.4 8.5 - 10.4 mg/dL Final     Bilirubin Total   Date Value Ref Range Status   11/01/2023 0.2 <=1.2 mg/dL Final   02/22/2008 0.4 0.2 - 1.3 mg/dL Final     Alkaline Phosphatase   Date Value Ref Range Status   11/01/2023 97 35 - 104 U/L Final   02/22/2008 138 40 - 150 U/L Final     ALT   Date Value Ref Range Status   11/01/2023 12 0 - 50 U/L Final     Comment:     Reference intervals for this test were updated on 6/12/2023 to more accurately reflect our healthy population. There may be differences in the flagging of prior results with similar values performed with this method. Interpretation of those prior results can be made in the context of the updated reference intervals.     02/22/2008 28 0 - 50 U/L Final     AST   Date Value Ref Range Status   11/01/2023 19 0 - 45 U/L Final     Comment:     Reference intervals for this test  were updated on 6/12/2023 to more accurately reflect our healthy population. There may be differences in the flagging of prior results with similar values performed with this method. Interpretation of those prior results can be made in the context of the updated reference intervals.   02/22/2008 26 0 - 45 U/L Final           I have personally reviewed the following data over the past 24 hrs:    INR:  N/A PTT:  N/A   D-dimer:  N/A Fibrinogen:  N/A       Imaging results reviewed over the past 24 hrs:   No results found for this or any previous visit (from the past 24 hour(s)).

## 2023-11-08 ENCOUNTER — APPOINTMENT (OUTPATIENT)
Dept: PHYSICAL THERAPY | Facility: CLINIC | Age: 78
DRG: 560 | End: 2023-11-08
Attending: INTERNAL MEDICINE
Payer: COMMERCIAL

## 2023-11-08 LAB
GLUCOSE BLDC GLUCOMTR-MCNC: 113 MG/DL (ref 70–99)
GLUCOSE BLDC GLUCOMTR-MCNC: 131 MG/DL (ref 70–99)
GLUCOSE BLDC GLUCOMTR-MCNC: 138 MG/DL (ref 70–99)
GLUCOSE BLDC GLUCOMTR-MCNC: 146 MG/DL (ref 70–99)
INR PPP: 2.23 (ref 0.85–1.15)

## 2023-11-08 PROCEDURE — 99233 SBSQ HOSP IP/OBS HIGH 50: CPT | Performed by: STUDENT IN AN ORGANIZED HEALTH CARE EDUCATION/TRAINING PROGRAM

## 2023-11-08 PROCEDURE — 250N000013 HC RX MED GY IP 250 OP 250 PS 637: Performed by: STUDENT IN AN ORGANIZED HEALTH CARE EDUCATION/TRAINING PROGRAM

## 2023-11-08 PROCEDURE — 97542 WHEELCHAIR MNGMENT TRAINING: CPT | Mod: GP | Performed by: PHYSICAL THERAPIST

## 2023-11-08 PROCEDURE — 120N000017 HC R&B RESPIRATORY CARE

## 2023-11-08 PROCEDURE — 85610 PROTHROMBIN TIME: CPT | Performed by: STUDENT IN AN ORGANIZED HEALTH CARE EDUCATION/TRAINING PROGRAM

## 2023-11-08 PROCEDURE — 97530 THERAPEUTIC ACTIVITIES: CPT | Mod: GP | Performed by: PHYSICAL THERAPIST

## 2023-11-08 PROCEDURE — 250N000011 HC RX IP 250 OP 636: Performed by: STUDENT IN AN ORGANIZED HEALTH CARE EDUCATION/TRAINING PROGRAM

## 2023-11-08 RX ORDER — WARFARIN SODIUM 2 MG/1
2 TABLET ORAL
Status: COMPLETED | OUTPATIENT
Start: 2023-11-08 | End: 2023-11-08

## 2023-11-08 RX ADMIN — MUPIROCIN: 20 OINTMENT TOPICAL at 21:18

## 2023-11-08 RX ADMIN — METHADONE HYDROCHLORIDE 5 MG: 5 TABLET ORAL at 21:07

## 2023-11-08 RX ADMIN — METHADONE HYDROCHLORIDE 5 MG: 5 TABLET ORAL at 08:42

## 2023-11-08 RX ADMIN — DOCUSATE SODIUM AND SENNOSIDES 4 TABLET: 8.6; 5 TABLET, FILM COATED ORAL at 08:42

## 2023-11-08 RX ADMIN — HYDROMORPHONE HYDROCHLORIDE 2 MG: 2 TABLET ORAL at 10:29

## 2023-11-08 RX ADMIN — DOCUSATE SODIUM AND SENNOSIDES 4 TABLET: 8.6; 5 TABLET, FILM COATED ORAL at 21:07

## 2023-11-08 RX ADMIN — MUPIROCIN: 20 OINTMENT TOPICAL at 08:43

## 2023-11-08 RX ADMIN — MICONAZOLE NITRATE: 20 POWDER TOPICAL at 08:43

## 2023-11-08 RX ADMIN — WARFARIN SODIUM 2 MG: 2 TABLET ORAL at 17:47

## 2023-11-08 RX ADMIN — METHADONE HYDROCHLORIDE 5 MG: 5 TABLET ORAL at 12:27

## 2023-11-08 RX ADMIN — WHITE PETROLATUM: 1.75 OINTMENT TOPICAL at 08:43

## 2023-11-08 RX ADMIN — POLYETHYLENE GLYCOL 3350 17 G: 17 POWDER, FOR SOLUTION ORAL at 21:18

## 2023-11-08 RX ADMIN — ASPIRIN 81 MG: 81 TABLET, COATED ORAL at 08:42

## 2023-11-08 RX ADMIN — INSULIN DEGLUDEC INJECTION 12 UNITS: 100 INJECTION, SOLUTION SUBCUTANEOUS at 21:15

## 2023-11-08 RX ADMIN — MICONAZOLE NITRATE: 20 POWDER TOPICAL at 21:20

## 2023-11-08 RX ADMIN — MAGNESIUM HYDROXIDE 30 ML: 2400 SUSPENSION ORAL at 11:42

## 2023-11-08 RX ADMIN — ATORVASTATIN CALCIUM 40 MG: 40 TABLET, FILM COATED ORAL at 21:07

## 2023-11-08 RX ADMIN — HYDROMORPHONE HYDROCHLORIDE 0.3 MG: 1 INJECTION, SOLUTION INTRAMUSCULAR; INTRAVENOUS; SUBCUTANEOUS at 11:00

## 2023-11-08 RX ADMIN — TORSEMIDE 15 MG: 10 TABLET ORAL at 08:42

## 2023-11-08 RX ADMIN — MULTIPLE VITAMINS W/ MINERALS TAB 1 TABLET: TAB at 08:42

## 2023-11-08 RX ADMIN — METHADONE HYDROCHLORIDE 10 MG: 10 TABLET ORAL at 16:27

## 2023-11-08 RX ADMIN — INSULIN ASPART 1 UNITS: 100 INJECTION, SOLUTION INTRAVENOUS; SUBCUTANEOUS at 12:28

## 2023-11-08 RX ADMIN — HYDROCORTISONE: 25 CREAM TOPICAL at 08:43

## 2023-11-08 RX ADMIN — HYDROCORTISONE: 25 CREAM TOPICAL at 21:18

## 2023-11-08 RX ADMIN — POLYETHYLENE GLYCOL 3350 17 G: 17 POWDER, FOR SOLUTION ORAL at 08:42

## 2023-11-08 ASSESSMENT — ACTIVITIES OF DAILY LIVING (ADL)
ADLS_ACUITY_SCORE: 43

## 2023-11-08 NOTE — PLAN OF CARE
Problem: Adult Inpatient Plan of Care  Goal: Plan of Care Review  Description: The Plan of Care Review/Shift note should be completed every shift.  The Outcome Evaluation is a brief statement about your assessment that the patient is improving, declining, or no change.  This information will be displayed automatically on your shift  note.  Outcome: Progressing     Problem: Wound  Goal: Optimal Coping  Outcome: Progressing  Intervention: Support Patient and Family Response  Recent Flowsheet Documentation  Taken 11/8/2023 1053 by Gila Robins RN  Supportive Measures: active listening utilized   Goal Outcome Evaluation:    Patient alert and cooperative; pleasant on approach. Medication and care compliant. PRN dilaudid given prior to wound dressing change per order. PRN Milk of Mag given for constipation. Large hard BM this shift on commode.. OOB for therapy and tolerated well. Continues to use purewick on demand for urination. PICC line patent. Uneventful shift. Plan of care ongoing. /58 (BP Location: Left arm, Patient Position: Sitting, Cuff Size: Adult Regular)   Pulse 115   Temp 98.4  F (36.9  C) (Oral)   Resp 20   Wt 107.9 kg (237 lb 14.4 oz)   SpO2 92%   BMI 38.40 kg/m     Gila Robins, JOSE

## 2023-11-08 NOTE — PROGRESS NOTES
Care Management Follow Up    Length of Stay (days): 15    Expected Discharge Date: 11/15/2023     Concerns to be Addressed:       Patient plan of care discussed at interdisciplinary rounds: Yes    Anticipated Discharge Disposition:  Most likely TCU.       Anticipated Discharge Services:    Anticipated Discharge DME:      Patient/family educated on Medicare website which has current facility and service quality ratings:    Education Provided on the Discharge Plan:    Patient/Family in Agreement with the Plan:      Referrals Placed by CM/SW:    Private pay costs discussed: Not applicable    Additional Information:  Patient discussed at morning rounds and chart reviewed.  Patient is doing OK She was constipated but did have BM today.  On a bowel regimen.  Patient is going to be off IV pain meds starting tomorrow.  Care progression meeting was done on 11/6/23.  Patient and family are considering options for discharge placement. Did sent out two more referrals to SNF near her home town.this afternoon.      Steffanie Barrera RN, BSN  Care Coordination  [facility]  [phone]

## 2023-11-08 NOTE — PROGRESS NOTES
[facility]    Medicine Progress Note - Hospitalist Service    Date of Admission:  10/24/2023    Hospital Course  Linda Loredo is a 78y F PMHx IDDM, chronic venous stasis, chronic pain syndrome, afib, HTN, HLD, CKD3, BERLIN, and FTT who presents to [facility] for ongoing wound care and therapies. Pt initially presented 9/23 to [facility] after welfare check by police. She was found to have L leg swelling and edema and was diagnosed with polymicrobial osteomyelitis. She is s/p debridement by podiatry on 9/26. Bcx negative at the time. ALMA with poor flow and pt was transferred to [facility] for vascular surgery evaluation. Pt managed with meropenem and vancomycin at the time for osteo. 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 Vascular surgery was unable to close the stump at the time and elected for ongoing healing prior to surgical closure thus she was discharged to [facility] for ongoing wound care and therapy.      [facility] Course  10/26-10/30: Bowel regimen uptitrated given pt was impacted on arrival and did not completely empty after initial BM. Good BM by end of weekend. BM 10/29; patient not agreeable to increasing scheduled bowel regimen at this time. RLE with redness below knee, low suspicion of infection, monitoring.   10/31 - 11/5.  Progressing well.  We have had difficulty with constipation.  Has been refusing suppository but now seems agreeable to cathartics and stool softeners.  Has been having minimal bowel movements.  11/5 itchy rash noted on both hands most likely an allergic reaction.  Plan for hydrocortisone cream.    11/6: further constipation last week, increase senna to 4Q BID and encourage MoM usage. Start I:C ratio 1:15 with lunch and dinner. Decrease dilaudid to 2mg PO.   11/7: No BM yet, pt taking her 4 senna BID but possibly not her miralax. She will take 1-2 doses of MoM today. Keep dilaudid stable dose today/tomorrow, plan to decrease Thursday.  Monitor BG, improved with I:C ratio, further adjustments possible later this week as indicated. Rash on arms/hands improving.  11/8: Increase I:C ratio to 1:10. BM today, agreeable to continuing bowel regimen for now until cleaned out. Stop dilaudid IV dose.     Assessment & Plan   Constipation   - doc-senna 4Q BID (11/6)  - miralax BID (10/26)  - MoM TID prn  - Viscous lidocaine for anal pain    Skin lesion, right lower extremity, Acute  -Improving  -Bactroban cream, apply twice daily to affected areas.    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)  S/p Tibial/Fibular Resection and Debridement of Stump (10/17)  Chronic Venous Stasis  - Completed course of meropenem/vancomycin 10/8 for osteomyelitis  - Vascular surgery planning to close in the future, monthly clinic visits for wound checks with closure at unspecified time in the future  - Dilaudid 2mg PO, stop IV dose  - WOC consulted and following  - PT/OT    Acute on Chronic Pain Syndrome  - methadone 5mg TID and 10mg @ 1500  - dilaudid 2mg PO  - previous Psych eval PTA, no associated depression    IDDM  - Tresiba 12u at bedtime  - increase to 1:10 insulin:carb ratio with lunch/dinner  - ISS  - Monitor blood glucose with Accu-Cheks and adjust insulin accordingly    Afib  HTN  HLD  -Rate controlled at this time.  -Warfarin, pharmacy to dose  -Continue with statin  -Holding PTA lisinopril 5mg per pt wishes, can restart after discharge    CKD3.  Stable at this time  - baseline Cr 1-1.2  - holding lisinopril  -Monitor with BMP    FTT  - nutrition consult  - SW following    BERLIN  - refusing CPAP      Diet: Consistent Carbohydrate Diet Moderate Consistent Carb (60 g CHO per Meal) Diet  Snacks/Supplements  Adult: Glucerna; Between Meals  Snacks/Supplements Adult: Expedite Bottle; With Meals    DVT Prophylaxis: Warfarin  Braga Catheter: Not present  Lines: PRESENT      PICC 10/09/23 Triple Lumen Right Basilic-Site Assessment: WDL      Cardiac Monitoring: None  Code Status: Full Code      Clinically Significant Risk Factors              # Hypoalbuminemia: Lowest albumin = 3.2 g/dL at 11/1/2023  6:13 AM, will monitor as appropriate     # Hypertension: Noted on problem list       # DMII: A1C = 7.0 % (Ref range: <5.7 %) within past 6 months              Disposition Plan     Expected Discharge Date: 11/15/2023    Discharge Delays: Complex Discharge  Destination: inpatient rehabilitation facility  Discharge Comments: Wound Care-daily. Pain management. - still on IV pain meds prior to wound cares.            Miguel Shannon MD  Hospitalist Service  [facility]  Securely message with [employer] (more info)  Text page via [employer] Paging/Directory   ______________________________________________________________________    Interval History   - no acute events ovn  - pt doing well today  - had a good BM this afternoon, agreeable to continuing bowel regimen presently as ordered  - discussed stopping IV dilaudid dose, pt agreeable for tomorrow  - no other acute concerns    Physical Exam   Vital Signs: Temp: 98.4  F (36.9  C) Temp src: Oral BP: 126/64 Pulse: 94   Resp: 20 SpO2: 99 % O2 Device: None (Room air)    Weight: 237 lbs 14.4 oz  Gen: She is a morbidly obese female lying in bed comfortably no distress  HEET: Head is normocephalic atraumatic eyes pupils appear equal round and reactive to light  Heart: She has a good S1 and S2 no obvious murmurs, no JVD   Lungs: She has a normal respiratory effort on auscultation good air entry is noted.   Abdomen: Soft nontender nondistended bowel sounds are noted  Musculoskeletal: She has good muscle tone, below the left knee amputation noted  Skin: Crusted skin lesion on right leg, previous surrounding  areas of erythema have resolved.  Erythematous macules noted on both hands.  Please refer to nursing/wound care note for complete skin exam.    Medical Decision Making       50 MINUTES SPENT BY ME on the date of service doing chart review, history, exam, documentation & further activities per the note.      Data   Lab Results   Component Value Date    WBC 6.4 11/01/2023    WBC 9.4 02/22/2008     Lab Results   Component Value Date    RBC 3.04 11/01/2023    RBC 4.19 02/22/2008     Lab Results   Component Value Date    HGB 9.0 11/01/2023    HGB 12.6 02/22/2008     Lab Results   Component Value Date    HCT 29.3 11/01/2023    HCT 35.6 02/22/2008     Lab Results   Component Value Date    MCV 96 11/01/2023    MCV 85 02/22/2008     Lab Results   Component Value Date    MCH 29.6 11/01/2023    MCH 30.1 02/22/2008     Lab Results   Component Value Date    MCHC 30.7 11/01/2023    MCHC 35.4 02/22/2008     Lab Results   Component Value Date    RDW 14.6 11/01/2023    RDW Test not performed 02/22/2008     Lab Results   Component Value Date     11/01/2023     02/22/2008       Last Comprehensive Metabolic Panel:  Sodium   Date Value Ref Range Status   11/01/2023 140 135 - 145 mmol/L Final     Comment:     Reference intervals for this test were updated on 09/26/2023 to more accurately reflect our healthy population. There may be differences in the flagging of prior results with similar values performed with this method. Interpretation of those prior results can be made in the context of the updated reference intervals.    02/22/2008 140 133 - 144 mmol/L Final     Potassium   Date Value Ref Range Status   11/01/2023 4.4 3.4 - 5.3 mmol/L Final   06/28/2022 4.3 3.5 - 5.0 mmol/L Final   02/22/2008 4.5 3.4 - 5.3 mmol/L Final     Chloride   Date Value Ref Range Status   11/01/2023 100 98 - 107 mmol/L Final   06/28/2022 97 (L) 98 - 107 mmol/L Final   02/22/2008 100 94 - 109 mmol/L Final     Carbon Dioxide   Date Value Ref Range  Status   02/22/2008 28 20 - 32 mmol/L Final     Carbon Dioxide (CO2)   Date Value Ref Range Status   11/01/2023 29 22 - 29 mmol/L Final   06/28/2022 29 22 - 31 mmol/L Final     Anion Gap   Date Value Ref Range Status   11/01/2023 11 7 - 15 mmol/L Final   06/28/2022 13 5 - 18 mmol/L Final   02/22/2008 11 6 - 17 mmol/L Final     Glucose   Date Value Ref Range Status   06/28/2022 73 70 - 125 mg/dL Final   02/22/2008 298 (H) 60 - 99 mg/dL Final     GLUCOSE BY METER POCT   Date Value Ref Range Status   11/07/2023 183 (H) 70 - 99 mg/dL Final     Urea Nitrogen   Date Value Ref Range Status   11/01/2023 18.0 8.0 - 23.0 mg/dL Final   06/28/2022 42 (H) 8 - 28 mg/dL Final   02/22/2008 15 7 - 30 mg/dL Final     Creatinine   Date Value Ref Range Status   11/01/2023 0.96 (H) 0.51 - 0.95 mg/dL Final   02/22/2008 0.82 0.60 - 1.30 mg/dL Final     GFR Estimate   Date Value Ref Range Status   11/01/2023 60 (L) >60 mL/min/1.73m2 Final   02/22/2008 75 >60 mL/min/1.7m2 Final     Calcium   Date Value Ref Range Status   11/01/2023 9.8 8.8 - 10.2 mg/dL Final   02/22/2008 10.4 8.5 - 10.4 mg/dL Final     Bilirubin Total   Date Value Ref Range Status   11/01/2023 0.2 <=1.2 mg/dL Final   02/22/2008 0.4 0.2 - 1.3 mg/dL Final     Alkaline Phosphatase   Date Value Ref Range Status   11/01/2023 97 35 - 104 U/L Final   02/22/2008 138 40 - 150 U/L Final     ALT   Date Value Ref Range Status   11/01/2023 12 0 - 50 U/L Final     Comment:     Reference intervals for this test were updated on 6/12/2023 to more accurately reflect our healthy population. There may be differences in the flagging of prior results with similar values performed with this method. Interpretation of those prior results can be made in the context of the updated reference intervals.     02/22/2008 28 0 - 50 U/L Final     AST   Date Value Ref Range Status   11/01/2023 19 0 - 45 U/L Final     Comment:     Reference intervals for this test were updated on 6/12/2023 to more accurately  reflect our healthy population. There may be differences in the flagging of prior results with similar values performed with this method. Interpretation of those prior results can be made in the context of the updated reference intervals.   02/22/2008 26 0 - 45 U/L Final           I have personally reviewed the following data over the past 24 hrs:    INR:  N/A PTT:  N/A   D-dimer:  N/A Fibrinogen:  N/A       Imaging results reviewed over the past 24 hrs:   No results found for this or any previous visit (from the past 24 hour(s)).

## 2023-11-08 NOTE — PLAN OF CARE
Problem: Adult Inpatient Plan of Care  Goal: Optimal Comfort and Wellbeing  Outcome: Progressing     Problem: Wound  Goal: Skin Health and Integrity  Outcome: Progressing  Intervention: Optimize Skin Protection  Recent Flowsheet Documentation  Taken 11/8/2023 0606 by Isabela Ramos RN  Activity Management: activity adjusted per tolerance  Taken 11/8/2023 0300 by Isabela Ramos RN  Activity Management: activity adjusted per tolerance     Problem: Pain Acute  Goal: Optimal Pain Control and Function  Outcome: Progressing  Intervention: Prevent or Manage Pain  Recent Flowsheet Documentation  Taken 11/8/2023 0300 by Isabela Ramos RN  Bowel Elimination Promotion: adequate fluid intake promoted  Medication Review/Management: medications reviewed   Goal Outcome Evaluation:       BP at the beginning of night shift was 158/67, rechecked was  135/72. Patient A &O X4. Able to make her needs known. Noted her dressing in the stump on her left leg  has small amount of drainage at HS . Pt refused to have it changed because she does not want her medications/ narcotics  messed up . Patient was continent with urine. Refused to take PRN Milk of Magnesia at HS because she said she does not want to deal with it at night.  She wants to sleep.Pt is able to offload her weight in bed  occasionally. Slept majority of the shift.

## 2023-11-09 ENCOUNTER — APPOINTMENT (OUTPATIENT)
Dept: PHYSICAL THERAPY | Facility: CLINIC | Age: 78
DRG: 560 | End: 2023-11-09
Attending: INTERNAL MEDICINE
Payer: COMMERCIAL

## 2023-11-09 LAB
GLUCOSE BLDC GLUCOMTR-MCNC: 120 MG/DL (ref 70–99)
GLUCOSE BLDC GLUCOMTR-MCNC: 163 MG/DL (ref 70–99)
GLUCOSE BLDC GLUCOMTR-MCNC: 184 MG/DL (ref 70–99)
GLUCOSE BLDC GLUCOMTR-MCNC: 93 MG/DL (ref 70–99)
INR PPP: 2.01 (ref 0.85–1.15)

## 2023-11-09 PROCEDURE — 250N000013 HC RX MED GY IP 250 OP 250 PS 637: Performed by: STUDENT IN AN ORGANIZED HEALTH CARE EDUCATION/TRAINING PROGRAM

## 2023-11-09 PROCEDURE — 97530 THERAPEUTIC ACTIVITIES: CPT | Mod: GP | Performed by: PHYSICAL THERAPIST

## 2023-11-09 PROCEDURE — 120N000017 HC R&B RESPIRATORY CARE

## 2023-11-09 PROCEDURE — 99232 SBSQ HOSP IP/OBS MODERATE 35: CPT | Performed by: STUDENT IN AN ORGANIZED HEALTH CARE EDUCATION/TRAINING PROGRAM

## 2023-11-09 PROCEDURE — 85610 PROTHROMBIN TIME: CPT | Performed by: STUDENT IN AN ORGANIZED HEALTH CARE EDUCATION/TRAINING PROGRAM

## 2023-11-09 PROCEDURE — 97542 WHEELCHAIR MNGMENT TRAINING: CPT | Mod: GP | Performed by: PHYSICAL THERAPIST

## 2023-11-09 RX ORDER — WARFARIN SODIUM 2 MG/1
2 TABLET ORAL
Status: COMPLETED | OUTPATIENT
Start: 2023-11-09 | End: 2023-11-09

## 2023-11-09 RX ADMIN — TORSEMIDE 15 MG: 10 TABLET ORAL at 08:20

## 2023-11-09 RX ADMIN — MUPIROCIN: 20 OINTMENT TOPICAL at 20:29

## 2023-11-09 RX ADMIN — HYDROCORTISONE: 25 CREAM TOPICAL at 20:27

## 2023-11-09 RX ADMIN — METHADONE HYDROCHLORIDE 10 MG: 10 TABLET ORAL at 15:02

## 2023-11-09 RX ADMIN — WARFARIN SODIUM 2 MG: 2 TABLET ORAL at 18:20

## 2023-11-09 RX ADMIN — MUPIROCIN: 20 OINTMENT TOPICAL at 08:22

## 2023-11-09 RX ADMIN — WHITE PETROLATUM: 1.75 OINTMENT TOPICAL at 20:29

## 2023-11-09 RX ADMIN — MAGNESIUM HYDROXIDE 30 ML: 2400 SUSPENSION ORAL at 20:27

## 2023-11-09 RX ADMIN — POLYETHYLENE GLYCOL 3350 17 G: 17 POWDER, FOR SOLUTION ORAL at 20:21

## 2023-11-09 RX ADMIN — DOCUSATE SODIUM AND SENNOSIDES 4 TABLET: 8.6; 5 TABLET, FILM COATED ORAL at 20:21

## 2023-11-09 RX ADMIN — HYDROCORTISONE: 25 CREAM TOPICAL at 08:22

## 2023-11-09 RX ADMIN — MAGNESIUM HYDROXIDE 30 ML: 2400 SUSPENSION ORAL at 15:02

## 2023-11-09 RX ADMIN — METHADONE HYDROCHLORIDE 5 MG: 5 TABLET ORAL at 12:39

## 2023-11-09 RX ADMIN — ATORVASTATIN CALCIUM 40 MG: 40 TABLET, FILM COATED ORAL at 20:21

## 2023-11-09 RX ADMIN — HYDROMORPHONE HYDROCHLORIDE 2 MG: 2 TABLET ORAL at 10:35

## 2023-11-09 RX ADMIN — ASPIRIN 81 MG: 81 TABLET, COATED ORAL at 08:20

## 2023-11-09 RX ADMIN — DOCUSATE SODIUM AND SENNOSIDES 4 TABLET: 8.6; 5 TABLET, FILM COATED ORAL at 08:20

## 2023-11-09 RX ADMIN — POLYETHYLENE GLYCOL 3350 17 G: 17 POWDER, FOR SOLUTION ORAL at 08:22

## 2023-11-09 RX ADMIN — MULTIPLE VITAMINS W/ MINERALS TAB 1 TABLET: TAB at 08:20

## 2023-11-09 RX ADMIN — MICONAZOLE NITRATE: 20 POWDER TOPICAL at 08:21

## 2023-11-09 RX ADMIN — METHADONE HYDROCHLORIDE 5 MG: 5 TABLET ORAL at 08:20

## 2023-11-09 RX ADMIN — INSULIN DEGLUDEC INJECTION 12 UNITS: 100 INJECTION, SOLUTION SUBCUTANEOUS at 20:34

## 2023-11-09 RX ADMIN — MAGNESIUM HYDROXIDE 30 ML: 2400 SUSPENSION ORAL at 00:16

## 2023-11-09 RX ADMIN — METHADONE HYDROCHLORIDE 5 MG: 5 TABLET ORAL at 20:21

## 2023-11-09 RX ADMIN — MICONAZOLE NITRATE: 20 POWDER TOPICAL at 20:28

## 2023-11-09 RX ADMIN — INSULIN ASPART 1 UNITS: 100 INJECTION, SOLUTION INTRAVENOUS; SUBCUTANEOUS at 12:39

## 2023-11-09 ASSESSMENT — ACTIVITIES OF DAILY LIVING (ADL)
ADLS_ACUITY_SCORE: 45
ADLS_ACUITY_SCORE: 43
ADLS_ACUITY_SCORE: 44
ADLS_ACUITY_SCORE: 43
ADLS_ACUITY_SCORE: 45
ADLS_ACUITY_SCORE: 44
ADLS_ACUITY_SCORE: 43
ADLS_ACUITY_SCORE: 45

## 2023-11-09 NOTE — PROGRESS NOTES
[facility]    Medicine Progress Note - Hospitalist Service    Date of Admission:  10/24/2023    Hospital Course  Linda Loredo is a 78y F PMHx IDDM, chronic venous stasis, chronic pain syndrome, afib, HTN, HLD, CKD3, BERLIN, and FTT who presents to [facility] for ongoing wound care and therapies. Pt initially presented 9/23 to [facility] after welfare check by police. She was found to have L leg swelling and edema and was diagnosed with polymicrobial osteomyelitis. She is s/p debridement by podiatry on 9/26. Bcx negative at the time. ALMA with poor flow and pt was transferred to [facility] for vascular surgery evaluation. Pt managed with meropenem and vancomycin at the time for osteo. 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 Vascular surgery was unable to close the stump at the time and elected for ongoing healing prior to surgical closure thus she was discharged to [facility] for ongoing wound care and therapy.      [facility] Course  10/26-10/30: Bowel regimen uptitrated given pt was impacted on arrival and did not completely empty after initial BM. Good BM by end of weekend. BM 10/29; patient not agreeable to increasing scheduled bowel regimen at this time. RLE with redness below knee, low suspicion of infection, monitoring.   10/31 - 11/5.  Progressing well.  We have had difficulty with constipation.  Has been refusing suppository but now seems agreeable to cathartics and stool softeners.  Has been having minimal bowel movements.  11/5 itchy rash noted on both hands most likely an allergic reaction.  Plan for hydrocortisone cream.    11/6: further constipation last week, increase senna to 4Q BID and encourage MoM usage. Start I:C ratio 1:15 with lunch and dinner. Decrease dilaudid to 2mg PO.   11/7: No BM yet, pt taking her 4 senna BID but possibly not her miralax. She will take 1-2 doses of MoM today. Keep dilaudid stable dose today/tomorrow, plan to decrease Thursday.  Monitor BG, improved with I:C ratio, further adjustments possible later this week as indicated. Rash on arms/hands improving.  11/8: Increase I:C ratio to 1:10. BM today, agreeable to continuing bowel regimen for now until cleaned out. Stop dilaudid IV dose.   11/9: I:C ratio to 1:12. Continue bowel regimen. Schedule MoM.     Assessment & Plan   Constipation   - doc-senna 4Q BID (11/6)  - miralax BID (10/26)  - MoM TID scheduled (11/9)  - Viscous lidocaine for anal pain    Skin lesion, right lower extremity, Acute - improving  -Bactroban cream, apply twice daily to affected areas.    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)  S/p Tibial/Fibular Resection and Debridement of Stump (10/17)  Chronic Venous Stasis  - Completed course of meropenem/vancomycin 10/8 for osteomyelitis  - Vascular surgery planning to close in the future, monthly clinic visits for wound checks with closure at unspecified time in the future  - Dilaudid 2mg PO, stop IV dose  - WOC consulted and following  - PT/OT    Acute on Chronic Pain Syndrome  - methadone 5mg TID and 10mg @ 1500  - dilaudid 2mg PO  - previous Psych eval PTA, no associated depression    IDDM  - Tresiba 12u at bedtime  - increase to 1:12 insulin:carb ratio with lunch/dinner  - ISS  - Monitor blood glucose with Accu-Cheks and adjust insulin accordingly    Afib  HTN  HLD  -Rate controlled at this time.  -Warfarin, pharmacy to dose  -Continue with statin  -Holding PTA lisinopril 5mg per pt wishes, can restart after discharge    CKD3.  Stable at this time  - baseline Cr 1-1.2  - holding lisinopril  -Monitor with BMP    FTT  - nutrition consult  - SW following    BERLIN  - refusing CPAP      Diet: Consistent Carbohydrate  Diet Moderate Consistent Carb (60 g CHO per Meal) Diet  Snacks/Supplements Adult: Glucerna; Between Meals  Snacks/Supplements Adult: Expedite Bottle; With Meals    DVT Prophylaxis: Warfarin  Barga Catheter: Not present  Lines: PRESENT      PICC 10/09/23 Triple Lumen Right Basilic-Site Assessment: WDL      Cardiac Monitoring: None  Code Status: Full Code      Clinically Significant Risk Factors              # Hypoalbuminemia: Lowest albumin = 3.2 g/dL at 11/1/2023  6:13 AM, will monitor as appropriate     # Hypertension: Noted on problem list       # DMII: A1C = 7.0 % (Ref range: <5.7 %) within past 6 months              Disposition Plan     Expected Discharge Date: 11/15/2023    Discharge Delays: Complex Discharge  Destination: inpatient rehabilitation facility  Discharge Comments: Wound Care-daily. Pain management. - still on IV pain meds prior to wound cares.            Miguel Shannon MD  Hospitalist Service  [facility]  Securely message with [employer] (more info)  Text page via [employer] Paging/Directory   ______________________________________________________________________    Interval History   - no acute events ovn  - no BM since y/d  - pain stable today, will trial PO dilaudid only with wound changes  - has pain clinic appointment next week, figuring out whether virtual or in person needed  - no other acute concerns    Physical Exam   Vital Signs: Temp: 98.4  F (36.9  C) Temp src: Oral BP: 127/59 Pulse: 75   Resp: 18 SpO2: 100 % O2 Device: None (Room air)    Weight: 236 lbs 12.8 oz  Gen: She is a morbidly obese female lying in bed comfortably no distress  HEET: Head is normocephalic atraumatic eyes pupils appear equal round and reactive to light  Heart: She has a good S1 and S2 no obvious murmurs, no JVD   Lungs: She has a normal respiratory effort on auscultation good air entry is noted.   Abdomen: Soft nontender nondistended bowel sounds are noted  Musculoskeletal: She has good muscle tone, below the left knee  amputation noted  Skin: Crusted skin lesion on right leg, previous surrounding areas of erythema have resolved.  Erythematous macules noted on both hands.  Please refer to nursing/wound care note for complete skin exam.    Medical Decision Making       40 MINUTES SPENT BY ME on the date of service doing chart review, history, exam, documentation & further activities per the note.      Data   Lab Results   Component Value Date    WBC 6.4 11/01/2023    WBC 9.4 02/22/2008     Lab Results   Component Value Date    RBC 3.04 11/01/2023    RBC 4.19 02/22/2008     Lab Results   Component Value Date    HGB 9.0 11/01/2023    HGB 12.6 02/22/2008     Lab Results   Component Value Date    HCT 29.3 11/01/2023    HCT 35.6 02/22/2008     Lab Results   Component Value Date    MCV 96 11/01/2023    MCV 85 02/22/2008     Lab Results   Component Value Date    MCH 29.6 11/01/2023    MCH 30.1 02/22/2008     Lab Results   Component Value Date    MCHC 30.7 11/01/2023    MCHC 35.4 02/22/2008     Lab Results   Component Value Date    RDW 14.6 11/01/2023    RDW Test not performed 02/22/2008     Lab Results   Component Value Date     11/01/2023     02/22/2008       Last Comprehensive Metabolic Panel:  Sodium   Date Value Ref Range Status   11/01/2023 140 135 - 145 mmol/L Final     Comment:     Reference intervals for this test were updated on 09/26/2023 to more accurately reflect our healthy population. There may be differences in the flagging of prior results with similar values performed with this method. Interpretation of those prior results can be made in the context of the updated reference intervals.    02/22/2008 140 133 - 144 mmol/L Final     Potassium   Date Value Ref Range Status   11/01/2023 4.4 3.4 - 5.3 mmol/L Final   06/28/2022 4.3 3.5 - 5.0 mmol/L Final   02/22/2008 4.5 3.4 - 5.3 mmol/L Final     Chloride   Date Value Ref Range Status   11/01/2023 100 98 - 107 mmol/L Final   06/28/2022 97 (L) 98 - 107 mmol/L Final    02/22/2008 100 94 - 109 mmol/L Final     Carbon Dioxide   Date Value Ref Range Status   02/22/2008 28 20 - 32 mmol/L Final     Carbon Dioxide (CO2)   Date Value Ref Range Status   11/01/2023 29 22 - 29 mmol/L Final   06/28/2022 29 22 - 31 mmol/L Final     Anion Gap   Date Value Ref Range Status   11/01/2023 11 7 - 15 mmol/L Final   06/28/2022 13 5 - 18 mmol/L Final   02/22/2008 11 6 - 17 mmol/L Final     Glucose   Date Value Ref Range Status   06/28/2022 73 70 - 125 mg/dL Final   02/22/2008 298 (H) 60 - 99 mg/dL Final     GLUCOSE BY METER POCT   Date Value Ref Range Status   11/08/2023 131 (H) 70 - 99 mg/dL Final     Urea Nitrogen   Date Value Ref Range Status   11/01/2023 18.0 8.0 - 23.0 mg/dL Final   06/28/2022 42 (H) 8 - 28 mg/dL Final   02/22/2008 15 7 - 30 mg/dL Final     Creatinine   Date Value Ref Range Status   11/01/2023 0.96 (H) 0.51 - 0.95 mg/dL Final   02/22/2008 0.82 0.60 - 1.30 mg/dL Final     GFR Estimate   Date Value Ref Range Status   11/01/2023 60 (L) >60 mL/min/1.73m2 Final   02/22/2008 75 >60 mL/min/1.7m2 Final     Calcium   Date Value Ref Range Status   11/01/2023 9.8 8.8 - 10.2 mg/dL Final   02/22/2008 10.4 8.5 - 10.4 mg/dL Final     Bilirubin Total   Date Value Ref Range Status   11/01/2023 0.2 <=1.2 mg/dL Final   02/22/2008 0.4 0.2 - 1.3 mg/dL Final     Alkaline Phosphatase   Date Value Ref Range Status   11/01/2023 97 35 - 104 U/L Final   02/22/2008 138 40 - 150 U/L Final     ALT   Date Value Ref Range Status   11/01/2023 12 0 - 50 U/L Final     Comment:     Reference intervals for this test were updated on 6/12/2023 to more accurately reflect our healthy population. There may be differences in the flagging of prior results with similar values performed with this method. Interpretation of those prior results can be made in the context of the updated reference intervals.     02/22/2008 28 0 - 50 U/L Final     AST   Date Value Ref Range Status   11/01/2023 19 0 - 45 U/L Final     Comment:      Reference intervals for this test were updated on 6/12/2023 to more accurately reflect our healthy population. There may be differences in the flagging of prior results with similar values performed with this method. Interpretation of those prior results can be made in the context of the updated reference intervals.   02/22/2008 26 0 - 45 U/L Final           I have personally reviewed the following data over the past 24 hrs:    INR:  N/A PTT:  N/A   D-dimer:  N/A Fibrinogen:  N/A       Imaging results reviewed over the past 24 hrs:   No results found for this or any previous visit (from the past 24 hour(s)).

## 2023-11-09 NOTE — PLAN OF CARE
Problem: Adult Inpatient Plan of Care  Goal: Optimal Comfort and Wellbeing  Outcome: Progressing   Goal Outcome Evaluation:  Patient is alert and oriented x4. Able to make needs known. Vital signs stable. Slept on and off during the night. Continent of bowel and bladder. Scheduled pain medications given. No concerns throughout the shift. All needs attended.

## 2023-11-09 NOTE — PLAN OF CARE
Problem: Adult Inpatient Plan of Care  Goal: Plan of Care Review  Description: The Plan of Care Review/Shift note should be completed every shift.  The Outcome Evaluation is a brief statement about your assessment that the patient is improving, declining, or no change.  This information will be displayed automatically on your shift  note.  Outcome: Progressing     Problem: Wound  Goal: Optimal Coping  Outcome: Progressing  Intervention: Support Patient and Family Response  Recent Flowsheet Documentation  Taken 11/9/2023 1732 by Gila Robins RN  Supportive Measures: active listening utilized     Problem: Pain Acute  Goal: Optimal Pain Control and Function  Outcome: Progressing  Intervention: Prevent or Manage Pain  Recent Flowsheet Documentation  Taken 11/9/2023 1732 by Gila Robins RN  Bowel Elimination Promotion: adequate fluid intake promoted  Medication Review/Management: medications reviewed  Intervention: Optimize Psychosocial Wellbeing  Recent Flowsheet Documentation  Taken 11/9/2023 1732 by Gila Robins RN  Supportive Measures: active listening utilized   Goal Outcome Evaluation:     Patient alert and cooperative; pleasant on approach. Medication and care compliant. Given prn dilaudid prior to wound dressing change. Pt tolerated well. Ate well with good fluid intake; encouraged to increase water intake. Prior to supper BS 96. Pt reported feeling like her sugar was low; ate two small candy bars prior to blood sugar being taken. Pt visibly shaking and stated she doesn't feel well; reporting she thinks she is getting too much insulin. Given glass of orange juice.  MD notified and instructed to hold supper carb dosed insulin. Plan of care ongooing. /66 (BP Location: Left arm)   Pulse 97   Temp 97.7  F (36.5  C) (Oral)   Resp 18   Wt 107.4 kg (236 lb 12.8 oz)   SpO2 93%   BMI 38.22 kg/m     Gila Robins RN

## 2023-11-09 NOTE — PROGRESS NOTES
Social Work Note:  Needs TCU.  Currently Homeless      Patient chart reviewed.  Reviewed RN case manager note and spoke with attending MD.  It appears patient needs TCU for un-going therapy and wound care.  TCU referrals made today:  [facility]  [facility]  [facility] ([facility]).  [facility]-Declined  [facility]  [facility]-Declined  [facility]-declined due to wound care  [facility] [place]ined  [facility]  [facility]  [place]  [place]: [facility] referral made to [facility] at [place].       Eleuterio Torres, [facility]/[place]  [phone]

## 2023-11-10 ENCOUNTER — APPOINTMENT (OUTPATIENT)
Dept: PHYSICAL THERAPY | Facility: CLINIC | Age: 78
DRG: 560 | End: 2023-11-10
Attending: INTERNAL MEDICINE
Payer: COMMERCIAL

## 2023-11-10 LAB
GLUCOSE BLDC GLUCOMTR-MCNC: 117 MG/DL (ref 70–99)
GLUCOSE BLDC GLUCOMTR-MCNC: 144 MG/DL (ref 70–99)
GLUCOSE BLDC GLUCOMTR-MCNC: 158 MG/DL (ref 70–99)
GLUCOSE BLDC GLUCOMTR-MCNC: 212 MG/DL (ref 70–99)
INR PPP: 1.84 (ref 0.85–1.15)

## 2023-11-10 PROCEDURE — 97110 THERAPEUTIC EXERCISES: CPT | Mod: GP | Performed by: PHYSICAL THERAPIST

## 2023-11-10 PROCEDURE — 120N000017 HC R&B RESPIRATORY CARE

## 2023-11-10 PROCEDURE — 99232 SBSQ HOSP IP/OBS MODERATE 35: CPT | Performed by: STUDENT IN AN ORGANIZED HEALTH CARE EDUCATION/TRAINING PROGRAM

## 2023-11-10 PROCEDURE — G0463 HOSPITAL OUTPT CLINIC VISIT: HCPCS

## 2023-11-10 PROCEDURE — 250N000013 HC RX MED GY IP 250 OP 250 PS 637: Performed by: STUDENT IN AN ORGANIZED HEALTH CARE EDUCATION/TRAINING PROGRAM

## 2023-11-10 PROCEDURE — 97530 THERAPEUTIC ACTIVITIES: CPT | Mod: GP | Performed by: PHYSICAL THERAPIST

## 2023-11-10 PROCEDURE — 85610 PROTHROMBIN TIME: CPT | Performed by: STUDENT IN AN ORGANIZED HEALTH CARE EDUCATION/TRAINING PROGRAM

## 2023-11-10 PROCEDURE — 97542 WHEELCHAIR MNGMENT TRAINING: CPT | Mod: GP | Performed by: PHYSICAL THERAPIST

## 2023-11-10 RX ORDER — WARFARIN SODIUM 2 MG/1
2 TABLET ORAL
Status: COMPLETED | OUTPATIENT
Start: 2023-11-10 | End: 2023-11-10

## 2023-11-10 RX ADMIN — WARFARIN SODIUM 2 MG: 2 TABLET ORAL at 17:35

## 2023-11-10 RX ADMIN — MAGNESIUM HYDROXIDE 30 ML: 2400 SUSPENSION ORAL at 20:31

## 2023-11-10 RX ADMIN — MUPIROCIN: 20 OINTMENT TOPICAL at 08:34

## 2023-11-10 RX ADMIN — DOCUSATE SODIUM AND SENNOSIDES 4 TABLET: 8.6; 5 TABLET, FILM COATED ORAL at 08:33

## 2023-11-10 RX ADMIN — ASPIRIN 81 MG: 81 TABLET, COATED ORAL at 08:34

## 2023-11-10 RX ADMIN — INSULIN ASPART 2 UNITS: 100 INJECTION, SOLUTION INTRAVENOUS; SUBCUTANEOUS at 12:03

## 2023-11-10 RX ADMIN — INSULIN DEGLUDEC INJECTION 12 UNITS: 100 INJECTION, SOLUTION SUBCUTANEOUS at 20:30

## 2023-11-10 RX ADMIN — MAGNESIUM HYDROXIDE 30 ML: 2400 SUSPENSION ORAL at 08:33

## 2023-11-10 RX ADMIN — HYDROCORTISONE: 25 CREAM TOPICAL at 20:37

## 2023-11-10 RX ADMIN — HYDROCORTISONE: 25 CREAM TOPICAL at 08:35

## 2023-11-10 RX ADMIN — ATORVASTATIN CALCIUM 40 MG: 40 TABLET, FILM COATED ORAL at 20:32

## 2023-11-10 RX ADMIN — MUPIROCIN: 20 OINTMENT TOPICAL at 21:03

## 2023-11-10 RX ADMIN — POLYETHYLENE GLYCOL 3350 17 G: 17 POWDER, FOR SOLUTION ORAL at 20:32

## 2023-11-10 RX ADMIN — MICONAZOLE NITRATE: 20 POWDER TOPICAL at 20:30

## 2023-11-10 RX ADMIN — MAGNESIUM HYDROXIDE 30 ML: 2400 SUSPENSION ORAL at 13:23

## 2023-11-10 RX ADMIN — POLYETHYLENE GLYCOL 3350 17 G: 17 POWDER, FOR SOLUTION ORAL at 08:34

## 2023-11-10 RX ADMIN — TORSEMIDE 15 MG: 10 TABLET ORAL at 08:36

## 2023-11-10 RX ADMIN — DOCUSATE SODIUM AND SENNOSIDES 4 TABLET: 8.6; 5 TABLET, FILM COATED ORAL at 20:31

## 2023-11-10 RX ADMIN — METHADONE HYDROCHLORIDE 5 MG: 5 TABLET ORAL at 08:33

## 2023-11-10 RX ADMIN — MICONAZOLE NITRATE: 20 POWDER TOPICAL at 08:34

## 2023-11-10 RX ADMIN — INSULIN ASPART 1 UNITS: 100 INJECTION, SOLUTION INTRAVENOUS; SUBCUTANEOUS at 17:34

## 2023-11-10 RX ADMIN — MULTIPLE VITAMINS W/ MINERALS TAB 1 TABLET: TAB at 08:33

## 2023-11-10 RX ADMIN — METHADONE HYDROCHLORIDE 5 MG: 5 TABLET ORAL at 20:31

## 2023-11-10 RX ADMIN — METHADONE HYDROCHLORIDE 10 MG: 10 TABLET ORAL at 15:51

## 2023-11-10 RX ADMIN — METHADONE HYDROCHLORIDE 5 MG: 5 TABLET ORAL at 12:02

## 2023-11-10 ASSESSMENT — ACTIVITIES OF DAILY LIVING (ADL)
ADLS_ACUITY_SCORE: 43
ADLS_ACUITY_SCORE: 45
ADLS_ACUITY_SCORE: 43
ADLS_ACUITY_SCORE: 45
ADLS_ACUITY_SCORE: 45

## 2023-11-10 NOTE — PLAN OF CARE
Problem: Adult Inpatient Plan of Care  Goal: Absence of Hospital-Acquired Illness or Injury  Outcome: Progressing  Intervention: Prevent Infection  Recent Flowsheet Documentation  Taken 11/10/2023 1025 by Mariza Miller RN  Infection Prevention: hand hygiene promoted  Goal: Optimal Comfort and Wellbeing  Outcome: Progressing  Intervention: Monitor Pain and Promote Comfort  Recent Flowsheet Documentation  Taken 11/10/2023 1025 by Mariza Miller RN  Pain Management Interventions: declines     Problem: Wound  Goal: Optimal Coping  Outcome: Progressing  Intervention: Support Patient and Family Response  Recent Flowsheet Documentation  Taken 11/10/2023 1025 by Mariza Miller RN  Supportive Measures: active listening utilized  Goal: Optimal Functional Ability  Outcome: Progressing  Goal: Absence of Infection Signs and Symptoms  Outcome: Progressing  Intervention: Prevent or Manage Infection  Recent Flowsheet Documentation  Taken 11/10/2023 1025 by Mariza Miller RN  Isolation Precautions: contact precautions maintained  Goal: Improved Oral Intake  Outcome: Progressing  Intervention: Promote and Optimize Oral Intake  Recent Flowsheet Documentation  Taken 11/10/2023 1025 by Mariza Miller RN  Nutrition Interventions: supplemental foods provided  Goal: Optimal Pain Control and Function  Outcome: Progressing  Intervention: Prevent or Manage Pain  Recent Flowsheet Documentation  Taken 11/10/2023 1025 by Mariza Miller RN  Pain Management Interventions: declines  Goal: Skin Health and Integrity  Outcome: Progressing  Goal: Optimal Wound Healing  Outcome: Progressing     Problem: Anxiety Signs/Symptoms  Goal: Optimized Energy Level (Anxiety Signs/Symptoms)  Outcome: Progressing  Goal: Optimized Cognitive Function (Anxiety Signs/Symptoms)  Outcome: Progressing  Goal: Improved Mood Symptoms (Anxiety Signs/Symptoms)  Outcome: Progressing  Intervention: Optimize Emotion and  Mood  Recent Flowsheet Documentation  Taken 11/10/2023 1025 by Mariza Miller RN  Supportive Measures: active listening utilized     Problem: Pain Acute  Goal: Optimal Pain Control and Function  Outcome: Progressing  Intervention: Develop Pain Management Plan  Recent Flowsheet Documentation  Taken 11/10/2023 1025 by Mariza Miller RN  Pain Management Interventions: declines  Intervention: Prevent or Manage Pain  Recent Flowsheet Documentation  Taken 11/10/2023 1025 by Mariza Miller RN  Bowel Elimination Promotion: adequate fluid intake promoted  Intervention: Optimize Psychosocial Wellbeing  Recent Flowsheet Documentation  Taken 11/10/2023 1025 by Mariza Miller RN  Supportive Measures: active listening utilized   Goal Outcome Evaluation:         Pt is AOX4.  She is pleasant and very involved in her care.  Pt stated that her baseline pain is 6/10 which is neuropathic pain on both fingers and foot.        She was seen by WOC RN at around 1015 together with Bro  Roro to check on her L BKA wound.  She agreed to have  be in the room during wound dressing change.      L BKA wound looks good, moderate drainage with little to no swelling/inflammation.      Pt had a very large formed bowel movement this afternoon.  She verbalized feeling very great about the BM and she feels so empty.      She had a good appetite during dinner.  Continent with bowel but incontinent with bladder.  She is able to make her needs known.  She still has pain at 6/10 per her baseline at the end of the shift.       Mariza Miller RN

## 2023-11-10 NOTE — PROGRESS NOTES
[facility]    Medicine Progress Note - Hospitalist Service    Date of Admission:  10/24/2023    Hospital Course  Linda Loredo is a 78y F PMHx IDDM, chronic venous stasis, chronic pain syndrome, afib, HTN, HLD, CKD3, BERLIN, and FTT who presents to [facility] for ongoing wound care and therapies. Pt initially presented 9/23 to [facility] after welfare check by police. She was found to have L leg swelling and edema and was diagnosed with polymicrobial osteomyelitis. She is s/p debridement by podiatry on 9/26. Bcx negative at the time. ALMA with poor flow and pt was transferred to [facility] for vascular surgery evaluation. Pt managed with meropenem and vancomycin at the time for osteo. 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 Vascular surgery was unable to close the stump at the time and elected for ongoing healing prior to surgical closure thus she was discharged to [facility] for ongoing wound care and therapy.      [facility] Course  10/26-10/30: Bowel regimen uptitrated given pt was impacted on arrival and did not completely empty after initial BM. Good BM by end of weekend. BM 10/29; patient not agreeable to increasing scheduled bowel regimen at this time. RLE with redness below knee, low suspicion of infection, monitoring.   10/31 - 11/5.  Progressing well.  We have had difficulty with constipation.  Has been refusing suppository but now seems agreeable to cathartics and stool softeners.  Has been having minimal bowel movements.  11/5 itchy rash noted on both hands most likely an allergic reaction.  Plan for hydrocortisone cream.    11/6: further constipation last week, increase senna to 4Q BID and encourage MoM usage. Start I:C ratio 1:15 with lunch and dinner. Decrease dilaudid to 2mg PO.   11/7: No BM yet, pt taking her 4 senna BID but possibly not her miralax. She will take 1-2 doses of MoM today. Keep dilaudid stable dose today/tomorrow, plan to decrease Thursday.  Monitor BG, improved with I:C ratio, further adjustments possible later this week as indicated. Rash on arms/hands improving.  11/8: Increase I:C ratio to 1:10. BM today, agreeable to continuing bowel regimen for now until cleaned out. Stop dilaudid IV dose.   11/9: I:C ratio to 1:12. Continue bowel regimen. Schedule MoM. Held dinner dose of insulin as pt felt shaky and hypoglycemic (BG 96)  11/10: I:C to 1:15. Pain stable on dilaudid 2mg PO. No BM yet, maintain current regimen. Bro eval in person today.     Assessment & Plan   Constipation   - doc-senna 4Q BID (11/6)  - miralax BID (10/26)  - MoM TID scheduled (11/9)  - Viscous lidocaine for anal pain    Skin lesion, right lower extremity, Acute - improving  -Bactroban cream, apply twice daily to affected areas.    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)  S/p Tibial/Fibular Resection and Debridement of Stump (10/17)  Chronic Venous Stasis  - Completed course of meropenem/vancomycin 10/8 for osteomyelitis  - Vascular surgery planning to close in the future, monthly clinic visits for wound checks with closure at unspecified time in the future  - Dilaudid 2mg PO  - WOC consulted and following  - PT/OT    Acute on Chronic Pain Syndrome  - methadone 5mg TID and 10mg @ 1500  - dilaudid 2mg PO  - previous Psych eval PTA, no associated depression    IDDM  - Tresiba 12u at bedtime  - increase to 1:15 insulin:carb ratio with lunch/dinner  - ISS  - Monitor blood glucose with Accu-Cheks and adjust insulin accordingly    Afib  HTN  HLD  -Rate controlled at this time.  -Warfarin, pharmacy to dose  -Continue with statin  -Holding PTA lisinopril 5mg per pt wishes, can restart after discharge    CKD3.  Stable at  this time  - baseline Cr 1-1.2  - holding lisinopril  -Monitor with BMP    FTT  - nutrition consult  - SW following    BERLIN  - refusing CPAP      Diet: Consistent Carbohydrate Diet Moderate Consistent Carb (60 g CHO per Meal) Diet  Snacks/Supplements Adult: Glucerna; Between Meals  Snacks/Supplements Adult: Expedite Bottle; With Meals    DVT Prophylaxis: Warfarin  Braga Catheter: Not present  Lines: PRESENT      PICC 10/09/23 Triple Lumen Right Basilic-Site Assessment: WDL      Cardiac Monitoring: None  Code Status: Full Code      Clinically Significant Risk Factors              # Hypoalbuminemia: Lowest albumin = 3.2 g/dL at 11/1/2023  6:13 AM, will monitor as appropriate     # Hypertension: Noted on problem list       # DMII: A1C = 7.0 % (Ref range: <5.7 %) within past 6 months              Disposition Plan     Expected Discharge Date: 11/14/2023    Discharge Delays: Complex Discharge  *Medically Ready for Discharge  Placement - TCU  Destination: inpatient rehabilitation facility  Discharge Comments: Wound Care-daily. Pain management. - still on IV pain meds prior to wound cares.            Miguel Shannon MD  Hospitalist Service  [facility]  Securely message with [employer] (more info)  Text page via [employer] Paging/Directory   ______________________________________________________________________    Interval History   - no acute events ovn  - pt doing well this morning  - Bro here to eval in person  - pain stable y/d on 2mg dilaudid dose  - no BM ovn, OK to keep current regimen  - reported she felt hypoglycemic y/d, discussed lowering insulin dose back to 1:15 ratio  - no other acute concerns    Physical Exam   Vital Signs: Temp: 98.5  F (36.9  C) Temp src: Oral BP: (!) 156/80 Pulse: 85   Resp: 20 SpO2: 100 % O2 Device: None (Room air)    Weight: 236 lbs 12.8 oz  Gen: She is a morbidly obese female lying in bed comfortably no distress  HEET: Head is normocephalic atraumatic eyes pupils appear equal round and reactive to  light  Heart: She has a good S1 and S2 no obvious murmurs, no JVD   Lungs: She has a normal respiratory effort on auscultation good air entry is noted.   Abdomen: Soft nontender nondistended bowel sounds are noted  Musculoskeletal: She has good muscle tone, below the left knee amputation noted  Skin: Crusted skin lesion on right leg, previous surrounding areas of erythema have resolved.  Erythematous macules noted on both hands.  Please refer to nursing/wound care note for complete skin exam.    Medical Decision Making       45 MINUTES SPENT BY ME on the date of service doing chart review, history, exam, documentation & further activities per the note.      Data   Lab Results   Component Value Date    WBC 6.4 11/01/2023    WBC 9.4 02/22/2008     Lab Results   Component Value Date    RBC 3.04 11/01/2023    RBC 4.19 02/22/2008     Lab Results   Component Value Date    HGB 9.0 11/01/2023    HGB 12.6 02/22/2008     Lab Results   Component Value Date    HCT 29.3 11/01/2023    HCT 35.6 02/22/2008     Lab Results   Component Value Date    MCV 96 11/01/2023    MCV 85 02/22/2008     Lab Results   Component Value Date    MCH 29.6 11/01/2023    MCH 30.1 02/22/2008     Lab Results   Component Value Date    MCHC 30.7 11/01/2023    MCHC 35.4 02/22/2008     Lab Results   Component Value Date    RDW 14.6 11/01/2023    RDW Test not performed 02/22/2008     Lab Results   Component Value Date     11/01/2023     02/22/2008       Last Comprehensive Metabolic Panel:  Sodium   Date Value Ref Range Status   11/01/2023 140 135 - 145 mmol/L Final     Comment:     Reference intervals for this test were updated on 09/26/2023 to more accurately reflect our healthy population. There may be differences in the flagging of prior results with similar values performed with this method. Interpretation of those prior results can be made in the context of the updated reference intervals.    02/22/2008 140 133 - 144 mmol/L Final      Potassium   Date Value Ref Range Status   11/01/2023 4.4 3.4 - 5.3 mmol/L Final   06/28/2022 4.3 3.5 - 5.0 mmol/L Final   02/22/2008 4.5 3.4 - 5.3 mmol/L Final     Chloride   Date Value Ref Range Status   11/01/2023 100 98 - 107 mmol/L Final   06/28/2022 97 (L) 98 - 107 mmol/L Final   02/22/2008 100 94 - 109 mmol/L Final     Carbon Dioxide   Date Value Ref Range Status   02/22/2008 28 20 - 32 mmol/L Final     Carbon Dioxide (CO2)   Date Value Ref Range Status   11/01/2023 29 22 - 29 mmol/L Final   06/28/2022 29 22 - 31 mmol/L Final     Anion Gap   Date Value Ref Range Status   11/01/2023 11 7 - 15 mmol/L Final   06/28/2022 13 5 - 18 mmol/L Final   02/22/2008 11 6 - 17 mmol/L Final     Glucose   Date Value Ref Range Status   06/28/2022 73 70 - 125 mg/dL Final   02/22/2008 298 (H) 60 - 99 mg/dL Final     GLUCOSE BY METER POCT   Date Value Ref Range Status   11/10/2023 117 (H) 70 - 99 mg/dL Final     Urea Nitrogen   Date Value Ref Range Status   11/01/2023 18.0 8.0 - 23.0 mg/dL Final   06/28/2022 42 (H) 8 - 28 mg/dL Final   02/22/2008 15 7 - 30 mg/dL Final     Creatinine   Date Value Ref Range Status   11/01/2023 0.96 (H) 0.51 - 0.95 mg/dL Final   02/22/2008 0.82 0.60 - 1.30 mg/dL Final     GFR Estimate   Date Value Ref Range Status   11/01/2023 60 (L) >60 mL/min/1.73m2 Final   02/22/2008 75 >60 mL/min/1.7m2 Final     Calcium   Date Value Ref Range Status   11/01/2023 9.8 8.8 - 10.2 mg/dL Final   02/22/2008 10.4 8.5 - 10.4 mg/dL Final     Bilirubin Total   Date Value Ref Range Status   11/01/2023 0.2 <=1.2 mg/dL Final   02/22/2008 0.4 0.2 - 1.3 mg/dL Final     Alkaline Phosphatase   Date Value Ref Range Status   11/01/2023 97 35 - 104 U/L Final   02/22/2008 138 40 - 150 U/L Final     ALT   Date Value Ref Range Status   11/01/2023 12 0 - 50 U/L Final     Comment:     Reference intervals for this test were updated on 6/12/2023 to more accurately reflect our healthy population. There may be differences in the flagging  of prior results with similar values performed with this method. Interpretation of those prior results can be made in the context of the updated reference intervals.     02/22/2008 28 0 - 50 U/L Final     AST   Date Value Ref Range Status   11/01/2023 19 0 - 45 U/L Final     Comment:     Reference intervals for this test were updated on 6/12/2023 to more accurately reflect our healthy population. There may be differences in the flagging of prior results with similar values performed with this method. Interpretation of those prior results can be made in the context of the updated reference intervals.   02/22/2008 26 0 - 45 U/L Final           I have personally reviewed the following data over the past 24 hrs:    INR:  1.84 (H) PTT:  N/A   D-dimer:  N/A Fibrinogen:  N/A       Imaging results reviewed over the past 24 hrs:   No results found for this or any previous visit (from the past 24 hour(s)).

## 2023-11-10 NOTE — PROGRESS NOTES
Social Work Note:    Writer and attending MD met patient today to discuss discharge planning and need for TCU.    Roro from [facility] ([facility]) TCU came down to visit patient today.  They are screening patient for acceptance.  Left message for admissions at [facility].  Roro will go in room with our WOC RN to assessment wound care needs    Writer sent email/placed call to Trini @ [place] admissions Global referral made to Trini at [place]. Requesting she consider [place], or any facility in that area.     Eleuterio Torres, [facility]  [phone]

## 2023-11-10 NOTE — PLAN OF CARE
Problem: Pain Acute  Goal: Optimal Pain Control and Function  Outcome: Progressing  Intervention: Prevent or Manage Pain  Recent Flowsheet Documentation  Taken 11/10/2023 0123 by Elvin Hartmann Jr, RN  Sensory Stimulation Regulation: television on  Intervention: Optimize Psychosocial Wellbeing  Recent Flowsheet Documentation  Taken 11/10/2023 0123 by Elvin Hartmann Jr, RN  Supportive Measures: active listening utilized  Patient is alert and oriented x 4, has been able to let needs known. Patient verbalized of having an 8/10 body pain and scheduled medication for pain was then given. Patient slept well and is well rested althrough the night.

## 2023-11-10 NOTE — PROGRESS NOTES
Social Work Note:  Needs TCU.  Currently Homeless        Patient chart reviewed.  Reviewed RN case manager note and spoke with attending MD.  It appears patient needs TCU for un-going therapy and wound care.  TCU referrals made today:  [facility]  [facility]  [facility] ([facility]).  [facility]-Declined  [facility] [facility] [place]  [facility]-Declined  [facility]-declined due to wound care  [facility] [place]  [facility]  [facility]  [place]  [place]: Global referral made to Trini at [place].         Eleuterio Torres, [facility]  [phone]

## 2023-11-10 NOTE — PROGRESS NOTES
[facility]  WO Nurse Inpatient Assessment     Consulted for: Left BKA    Summary: Roro from [facility] ([facility] was present for visit today to assess wound and plan of care.     Patient History (according to provider note(s):      Per H+P:   78y F PMHx IDDM, chronic venous stasis, chronic pain syndrome, afib, HTN, HLD, CKD3, BERLIN, and FTT who presents to [facility] for ongoing wound care and therapies. Pt initially presented 9/23 to [facility] after welfare check by police. She was found to have L leg swelling and edema and was diagnosed with polymicrobial osteomyelitis. She is s/p debridement by podiatry on 9/26. Bcx negative at the time. ALMA with poor flow and pt was transferred to [facility] for vascular surgery evaluation. Pt managed with meropenem and vancomycin at the time for osteo. 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 Vascular surgery was unable to close the stump at the time and elected for ongoing healing prior to surgical closure thus she was discharged to [facility] for ongoing wound care and therapy.      Assessment:      Areas visualized during today's visit: Lower extremities  and buttocks    Wound location: Left buttock fold and skin folds  Wound due to: Friction and Incontinence Associated Dermatitis (IAD)  Wound history/plan of care: Patient with history of wounds and IAD  Healed       Wound location: Left BKA   Last photo: 11/3     Previous facility      10/26      11/3      11/10    Wound due to: surgical  Wound history/plan of care: s/p BKA, guillotine 10/7  Wound base:  Granulation a     Palpation of the wound bed: soft, boggy     Drainage: moderate     Description of drainage: serosanguinous     Measurements (length x width x depth, in cm): 17  x 17cm, raised in center but edges flush     Tunneling: N/A     Undermining: N/A  Periwound skin: intact, edema decreasing      Color: red/pink        Temperature: normal   Odor:  moderate  Pain: mild to moderate during dressing change  Pain interventions prior to dressing change: oral pain meds, slow and gentle cares  Treatment goal: Drainage control, Infection control/prevention, Increase granulation, Protection, and Prepare wound bed for flap/graft  STATUS: improving  Supplies ordered: stored on unit      Wound location: Plantar right foot    Last photo: 10/26  Wound due to:  Diabetic versus pressure  Wound history/plan of care: Patient states she has had this a long time   Wound base: discoloration under callus     Palpation of the wound bed: normal      Drainage: none     Description of drainage: none     Measurements (length x width x depth, in cm): 4  x 4.5 cm      Tunneling: N/A     Undermining: N/A  Periwound skin: Intact      Color: normal and consistent with surrounding tissue      Temperature: normal   Odor: none  Pain: denies   Treatment goal: Maintain (prevention of deterioration)  STATUS: stable  Patient also has dry patch/callus/eschar on left palm of hand - will monitor      Treatment Plan:     Left BKA: PLEASE READ ALL STEPS PRIOR TO STARTING (and administer pain meds per orders):  1. Apply Vashe soaked gauze to skin, including sreekanth wound skin, let soak for 5 min  2. Rinse with saline and pat dry (MUST DO THIS - Vashe is NOT compatible with Hydrofera  3. Apply skin prep to sreekanth wound skin  4. Cut Hydrofera Blue in half and soak with saline and place over wound bed - use both halves but do not place it a one piece to reduce pressure  5. cover with abd, and wrap with kerlix to secure  Change daily    Left buttock fold:  1. Cleanse with bath wipes, including sreekanth wound skin, gently pat dry  2. Apply Mepilex  Change every other day    Daily:  Interdry(order#[id number]):   1.  Wash skin gently. Pat, do not rub.  2.  With clean scissors, cut enough fabric to cover the affected area, allowing for a minimum of 2 inches to extend beyond the skin fold for moisture evaporation.  3.  Lay a  single layer of fabric in the skin fold, placing one edge into the base of the fold. Gently smooth the rest of the fabric over the skin, keeping it flat.  4.  Leave at least 2 inches of fabric exposed outside the skin fold.  5.  Secure the fabric in one of several ways: with the skin fold, with a small amount of skin-friendly tape, or tucked under clothing.  6.  Remove the fabric before bathing and reuse it when finished. When removing, gently separate the skin fold and lift away.  Helpful Hints  1. InterDry  can be written on with a ballpoint pen. It may be helpful to label each piece of InterDry  with the date you started using it.  2.  Each piece of InterDry  may be used up to 5 days, depending on fabric soiling, odor, amount of moisture and general skin condition. Replace InterDry  if it becomes soiled with blood, urine or stool.  3.  Do not use creams, ointments, or powders with InterDry  as it may interfere with product efficacy.      Orders: Reviewed    RECOMMEND PRIMARY TEAM ORDER: None, at this time  Education provided: plan of care, wound progress, Moisture management, Hygiene, and Off-loading pressure  Discussed plan of care with: Patient and Nurse   WOC nurse follow-up plan: weekly   Notify WOC if wound(s) deteriorate.  Nursing to notify the Provider(s) and re-consult the WOC Nurse if new skin concern.    DATA:     Current support surface: Standard  Low air loss (BRISA pump, Isolibrium, Pulsate, skin guard, etc)  Containment of urine/stool: Incontinence Protocol, Incontinent pad in bed, and Purewick external catheter   BMI: Body mass index is 38.22 kg/m .   Active diet order: Orders Placed This Encounter      Consistent Carbohydrate Diet Moderate Consistent Carb (60 g CHO per Meal) Diet     Output: I/O last 3 completed shifts:  In: 720 [P.O.:720]  Out: 2400 [Urine:2400]     Labs:   Recent Labs   Lab 11/10/23  0518   INR 1.84*     Pressure injury risk assessment:   Sensory Perception: 3-->slightly  limited  Moisture: 3-->occasionally moist  Activity: 2-->chairfast  Mobility: 3-->slightly limited  Nutrition: 3-->adequate  Friction and Shear: 2-->potential problem  Suresh Score: 16    JOHAN SethiN, RN, PHN, HNB-BC, CWOCN  Pager no longer is use, please contact through [facility] Connect group: [facility] [employer]

## 2023-11-11 LAB
GLUCOSE BLDC GLUCOMTR-MCNC: 134 MG/DL (ref 70–99)
GLUCOSE BLDC GLUCOMTR-MCNC: 138 MG/DL (ref 70–99)
GLUCOSE BLDC GLUCOMTR-MCNC: 164 MG/DL (ref 70–99)
GLUCOSE BLDC GLUCOMTR-MCNC: 178 MG/DL (ref 70–99)
INR PPP: 1.8 (ref 0.85–1.15)

## 2023-11-11 PROCEDURE — 99232 SBSQ HOSP IP/OBS MODERATE 35: CPT | Performed by: STUDENT IN AN ORGANIZED HEALTH CARE EDUCATION/TRAINING PROGRAM

## 2023-11-11 PROCEDURE — 85610 PROTHROMBIN TIME: CPT | Performed by: STUDENT IN AN ORGANIZED HEALTH CARE EDUCATION/TRAINING PROGRAM

## 2023-11-11 PROCEDURE — 97530 THERAPEUTIC ACTIVITIES: CPT | Mod: GP

## 2023-11-11 PROCEDURE — 250N000013 HC RX MED GY IP 250 OP 250 PS 637: Performed by: STUDENT IN AN ORGANIZED HEALTH CARE EDUCATION/TRAINING PROGRAM

## 2023-11-11 PROCEDURE — 97110 THERAPEUTIC EXERCISES: CPT | Mod: GP

## 2023-11-11 PROCEDURE — 120N000017 HC R&B RESPIRATORY CARE

## 2023-11-11 PROCEDURE — 97542 WHEELCHAIR MNGMENT TRAINING: CPT | Mod: GP

## 2023-11-11 RX ORDER — WARFARIN SODIUM 3 MG/1
3 TABLET ORAL
Status: COMPLETED | OUTPATIENT
Start: 2023-11-11 | End: 2023-11-11

## 2023-11-11 RX ADMIN — POLYETHYLENE GLYCOL 3350 17 G: 17 POWDER, FOR SOLUTION ORAL at 20:07

## 2023-11-11 RX ADMIN — ATORVASTATIN CALCIUM 40 MG: 40 TABLET, FILM COATED ORAL at 20:07

## 2023-11-11 RX ADMIN — DOCUSATE SODIUM AND SENNOSIDES 4 TABLET: 8.6; 5 TABLET, FILM COATED ORAL at 20:07

## 2023-11-11 RX ADMIN — MICONAZOLE NITRATE: 20 POWDER TOPICAL at 08:45

## 2023-11-11 RX ADMIN — METHADONE HYDROCHLORIDE 10 MG: 10 TABLET ORAL at 16:03

## 2023-11-11 RX ADMIN — MICONAZOLE NITRATE: 20 POWDER TOPICAL at 20:16

## 2023-11-11 RX ADMIN — INSULIN DEGLUDEC INJECTION 12 UNITS: 100 INJECTION, SOLUTION SUBCUTANEOUS at 20:15

## 2023-11-11 RX ADMIN — HYDROCORTISONE: 25 CREAM TOPICAL at 20:18

## 2023-11-11 RX ADMIN — METHADONE HYDROCHLORIDE 5 MG: 5 TABLET ORAL at 21:11

## 2023-11-11 RX ADMIN — DOCUSATE SODIUM AND SENNOSIDES 4 TABLET: 8.6; 5 TABLET, FILM COATED ORAL at 08:44

## 2023-11-11 RX ADMIN — MULTIPLE VITAMINS W/ MINERALS TAB 1 TABLET: TAB at 08:44

## 2023-11-11 RX ADMIN — METHADONE HYDROCHLORIDE 5 MG: 5 TABLET ORAL at 08:44

## 2023-11-11 RX ADMIN — INSULIN ASPART 1 UNITS: 100 INJECTION, SOLUTION INTRAVENOUS; SUBCUTANEOUS at 17:14

## 2023-11-11 RX ADMIN — METHADONE HYDROCHLORIDE 5 MG: 5 TABLET ORAL at 12:58

## 2023-11-11 RX ADMIN — MUPIROCIN: 20 OINTMENT TOPICAL at 08:45

## 2023-11-11 RX ADMIN — ASPIRIN 81 MG: 81 TABLET, COATED ORAL at 08:44

## 2023-11-11 RX ADMIN — TORSEMIDE 15 MG: 10 TABLET ORAL at 08:46

## 2023-11-11 RX ADMIN — WARFARIN SODIUM 3 MG: 3 TABLET ORAL at 17:23

## 2023-11-11 RX ADMIN — POLYETHYLENE GLYCOL 3350 17 G: 17 POWDER, FOR SOLUTION ORAL at 08:44

## 2023-11-11 RX ADMIN — HYDROCORTISONE: 25 CREAM TOPICAL at 08:45

## 2023-11-11 ASSESSMENT — ACTIVITIES OF DAILY LIVING (ADL)
ADLS_ACUITY_SCORE: 47
ADLS_ACUITY_SCORE: 45
ADLS_ACUITY_SCORE: 47
ADLS_ACUITY_SCORE: 47
ADLS_ACUITY_SCORE: 45
ADLS_ACUITY_SCORE: 47
ADLS_ACUITY_SCORE: 41
ADLS_ACUITY_SCORE: 47
ADLS_ACUITY_SCORE: 47
ADLS_ACUITY_SCORE: 39
ADLS_ACUITY_SCORE: 47
ADLS_ACUITY_SCORE: 41

## 2023-11-11 NOTE — PLAN OF CARE
Problem: Adult Inpatient Plan of Care  Goal: Optimal Comfort and Wellbeing  Outcome: Progressing     Problem: Wound  Goal: Absence of Infection Signs and Symptoms  Outcome: Progressing  Intervention: Prevent or Manage Infection  Recent Flowsheet Documentation  Taken 11/11/2023 0300 by Lacy Trimble RN  Isolation Precautions: contact precautions maintained     Problem: Pain Acute  Goal: Optimal Pain Control and Function  Outcome: Progressing  Intervention: Prevent or Manage Pain  Recent Flowsheet Documentation  Taken 11/11/2023 0300 by Lacy Trimble RN  Medication Review/Management: medications reviewed   Goal Outcome Evaluation:         Pt alert and oriented, pleasant and cooperative with cares. Able to verbalize needs.   Complained of neuropathic pain on fingers and foot , rated it 6/10 but stated it's usually her baseline pain rate. No PRN given. Pt's left BKA dressing was removed accidentally on the shift during repositioning, new dressing applied. Slept during the shift with interruptions.

## 2023-11-11 NOTE — PROGRESS NOTES
[facility]    Medicine Progress Note - Hospitalist Service    Date of Admission:  10/24/2023    Hospital Course  Linda Loredo is a 78y F PMHx IDDM, chronic venous stasis, chronic pain syndrome, afib, HTN, HLD, CKD3, BERLIN, and FTT who presents to [facility] for ongoing wound care and therapies. Pt initially presented 9/23 to [facility] after welfare check by police. She was found to have L leg swelling and edema and was diagnosed with polymicrobial osteomyelitis. She is s/p debridement by podiatry on 9/26. Bcx negative at the time. ALMA with poor flow and pt was transferred to [facility] for vascular surgery evaluation. Pt managed with meropenem and vancomycin at the time for osteo. 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 Vascular surgery was unable to close the stump at the time and elected for ongoing healing prior to surgical closure thus she was discharged to [facility] for ongoing wound care and therapy.      [facility] Course  10/26-10/30: Bowel regimen uptitrated given pt was impacted on arrival and did not completely empty after initial BM. Good BM by end of weekend. BM 10/29; patient not agreeable to increasing scheduled bowel regimen at this time. RLE with redness below knee, low suspicion of infection, monitoring.   10/31 - 11/5.  Progressing well.  We have had difficulty with constipation.  Has been refusing suppository but now seems agreeable to cathartics and stool softeners.  Has been having minimal bowel movements.  11/5 itchy rash noted on both hands most likely an allergic reaction.  Plan for hydrocortisone cream.    11/6: further constipation last week, increase senna to 4Q BID and encourage MoM usage. Start I:C ratio 1:15 with lunch and dinner. Decrease dilaudid to 2mg PO.   11/7: No BM yet, pt taking her 4 senna BID but possibly not her miralax. She will take 1-2 doses of MoM today. Keep dilaudid stable dose today/tomorrow, plan to decrease Thursday.  Monitor BG, improved with I:C ratio, further adjustments possible later this week as indicated. Rash on arms/hands improving.  11/8: Increase I:C ratio to 1:10. BM today, agreeable to continuing bowel regimen for now until cleaned out. Stop dilaudid IV dose.   11/9: I:C ratio to 1:12. Continue bowel regimen. Schedule MoM. Held dinner dose of insulin as pt felt shaky and hypoglycemic (BG 96)  11/10: I:C to 1:15. Pain stable on dilaudid 2mg PO. No BM yet, maintain current regimen. Bro eval in person today.   11/11: Good BM y/d, maintain regimen today. Wound changes now w/o dilaudid beforehand but will maintain prn in case pt asks for it.     Assessment & Plan   Constipation - improving  - good BM on 11/9 and 11/10  - plan to clean out until loose BM, then back down on regimen. Will still require scheduled regimen in the long term.  - doc-senna 4Q BID (11/6)  - miralax BID (10/26)  - MoM TID scheduled (11/9)  - Viscous lidocaine for anal pain    Skin lesion, right lower extremity, Acute - improving  -Bactroban cream, apply twice daily to affected areas.    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)  S/p Tibial/Fibular Resection and Debridement of Stump (10/17)  Chronic Venous Stasis  - Completed course of meropenem/vancomycin 10/8 for osteomyelitis  - Vascular surgery planning to close in the future, monthly clinic visits for wound checks with closure at unspecified time in the future  - Dilaudid 2mg PO  - WOC consulted and following  - PT/OT    Acute on Chronic Pain Syndrome  - methadone 5mg TID and 10mg @ 1500  - dilaudid 2mg PO  - previous Psych eval PTA, no associated depression    IDDM  - Tresiba 12u at bedtime  - 1:15 insulin:carb ratio with  lunch/dinner  - ISS  - Monitor blood glucose with Accu-Cheks and adjust insulin accordingly    Afib  HTN  HLD  -Rate controlled at this time.  -Warfarin, pharmacy to dose  -Continue with statin  -Holding PTA lisinopril 5mg per pt wishes, can restart after discharge    CKD3.  Stable at this time  - baseline Cr 1-1.2  - holding lisinopril  -Monitor with BMP    FTT  - nutrition consult  - SW following    BELRIN  - refusing CPAP      Diet: Consistent Carbohydrate Diet Moderate Consistent Carb (60 g CHO per Meal) Diet  Snacks/Supplements Adult: Glucerna; Between Meals  Snacks/Supplements Adult: Expedite Bottle; With Meals    DVT Prophylaxis: Warfarin  Braga Catheter: Not present  Lines: PRESENT      PICC 10/09/23 Triple Lumen Right Basilic-Site Assessment: WDL      Cardiac Monitoring: None  Code Status: Full Code      Clinically Significant Risk Factors              # Hypoalbuminemia: Lowest albumin = 3.2 g/dL at 11/1/2023  6:13 AM, will monitor as appropriate     # Hypertension: Noted on problem list       # DMII: A1C = 7.0 % (Ref range: <5.7 %) within past 6 months              Disposition Plan     Expected Discharge Date: 11/14/2023    Discharge Delays: Complex Discharge  *Medically Ready for Discharge  Placement - TCU  Destination: inpatient rehabilitation facility  Discharge Comments: Wound Care-daily. Pain management. - still on IV pain meds prior to wound cares.            Miguel Shannon MD  Hospitalist Service  [facility]  Securely message with [employer] (more info)  Text page via [employer] Paging/Directory   ______________________________________________________________________    Interval History   - no acute events ovn  - good BM y/d  - discussed continuing to clean out until soft and/or watery BM, then back off - pt agreeable  - did wound dressing change ovn w/o dilaudid, tolerated fine, agreeable to holding dilaudid before dressing changes for now but wishes to keep prn just in case  - no other acute concerns    Physical  Exam   Vital Signs: Temp: 98.6  F (37  C) Temp src: Oral BP: (!) 145/85 Pulse: 81   Resp: 20 SpO2: 95 % O2 Device: None (Room air)    Weight: 236 lbs 12.8 oz  Gen: She is a morbidly obese female lying in bed comfortably no distress  HEET: Head is normocephalic atraumatic eyes pupils appear equal round and reactive to light  Heart: She has a good S1 and S2 no obvious murmurs, no JVD   Lungs: She has a normal respiratory effort on auscultation good air entry is noted.   Abdomen: Soft nontender nondistended bowel sounds are noted  Musculoskeletal: She has good muscle tone, below the left knee amputation noted  Skin: Crusted skin lesion on right leg, previous surrounding areas of erythema have resolved.  Erythematous macules noted on both hands.  Please refer to nursing/wound care note for complete skin exam.    Medical Decision Making       45 MINUTES SPENT BY ME on the date of service doing chart review, history, exam, documentation & further activities per the note.      Data   Lab Results   Component Value Date    WBC 6.4 11/01/2023    WBC 9.4 02/22/2008     Lab Results   Component Value Date    RBC 3.04 11/01/2023    RBC 4.19 02/22/2008     Lab Results   Component Value Date    HGB 9.0 11/01/2023    HGB 12.6 02/22/2008     Lab Results   Component Value Date    HCT 29.3 11/01/2023    HCT 35.6 02/22/2008     Lab Results   Component Value Date    MCV 96 11/01/2023    MCV 85 02/22/2008     Lab Results   Component Value Date    MCH 29.6 11/01/2023    MCH 30.1 02/22/2008     Lab Results   Component Value Date    MCHC 30.7 11/01/2023    MCHC 35.4 02/22/2008     Lab Results   Component Value Date    RDW 14.6 11/01/2023    RDW Test not performed 02/22/2008     Lab Results   Component Value Date     11/01/2023     02/22/2008       Last Comprehensive Metabolic Panel:  Sodium   Date Value Ref Range Status   11/01/2023 140 135 - 145 mmol/L Final     Comment:     Reference intervals for this test were updated on  09/26/2023 to more accurately reflect our healthy population. There may be differences in the flagging of prior results with similar values performed with this method. Interpretation of those prior results can be made in the context of the updated reference intervals.    02/22/2008 140 133 - 144 mmol/L Final     Potassium   Date Value Ref Range Status   11/01/2023 4.4 3.4 - 5.3 mmol/L Final   06/28/2022 4.3 3.5 - 5.0 mmol/L Final   02/22/2008 4.5 3.4 - 5.3 mmol/L Final     Chloride   Date Value Ref Range Status   11/01/2023 100 98 - 107 mmol/L Final   06/28/2022 97 (L) 98 - 107 mmol/L Final   02/22/2008 100 94 - 109 mmol/L Final     Carbon Dioxide   Date Value Ref Range Status   02/22/2008 28 20 - 32 mmol/L Final     Carbon Dioxide (CO2)   Date Value Ref Range Status   11/01/2023 29 22 - 29 mmol/L Final   06/28/2022 29 22 - 31 mmol/L Final     Anion Gap   Date Value Ref Range Status   11/01/2023 11 7 - 15 mmol/L Final   06/28/2022 13 5 - 18 mmol/L Final   02/22/2008 11 6 - 17 mmol/L Final     Glucose   Date Value Ref Range Status   06/28/2022 73 70 - 125 mg/dL Final   02/22/2008 298 (H) 60 - 99 mg/dL Final     GLUCOSE BY METER POCT   Date Value Ref Range Status   11/10/2023 144 (H) 70 - 99 mg/dL Final     Urea Nitrogen   Date Value Ref Range Status   11/01/2023 18.0 8.0 - 23.0 mg/dL Final   06/28/2022 42 (H) 8 - 28 mg/dL Final   02/22/2008 15 7 - 30 mg/dL Final     Creatinine   Date Value Ref Range Status   11/01/2023 0.96 (H) 0.51 - 0.95 mg/dL Final   02/22/2008 0.82 0.60 - 1.30 mg/dL Final     GFR Estimate   Date Value Ref Range Status   11/01/2023 60 (L) >60 mL/min/1.73m2 Final   02/22/2008 75 >60 mL/min/1.7m2 Final     Calcium   Date Value Ref Range Status   11/01/2023 9.8 8.8 - 10.2 mg/dL Final   02/22/2008 10.4 8.5 - 10.4 mg/dL Final     Bilirubin Total   Date Value Ref Range Status   11/01/2023 0.2 <=1.2 mg/dL Final   02/22/2008 0.4 0.2 - 1.3 mg/dL Final     Alkaline Phosphatase   Date Value Ref Range Status    11/01/2023 97 35 - 104 U/L Final   02/22/2008 138 40 - 150 U/L Final     ALT   Date Value Ref Range Status   11/01/2023 12 0 - 50 U/L Final     Comment:     Reference intervals for this test were updated on 6/12/2023 to more accurately reflect our healthy population. There may be differences in the flagging of prior results with similar values performed with this method. Interpretation of those prior results can be made in the context of the updated reference intervals.     02/22/2008 28 0 - 50 U/L Final     AST   Date Value Ref Range Status   11/01/2023 19 0 - 45 U/L Final     Comment:     Reference intervals for this test were updated on 6/12/2023 to more accurately reflect our healthy population. There may be differences in the flagging of prior results with similar values performed with this method. Interpretation of those prior results can be made in the context of the updated reference intervals.   02/22/2008 26 0 - 45 U/L Final           I have personally reviewed the following data over the past 24 hrs:    INR:  N/A PTT:  N/A   D-dimer:  N/A Fibrinogen:  N/A       Imaging results reviewed over the past 24 hrs:   No results found for this or any previous visit (from the past 24 hour(s)).

## 2023-11-11 NOTE — PLAN OF CARE
Problem: Adult Inpatient Plan of Care  Goal: Absence of Hospital-Acquired Illness or Injury  Outcome: Progressing  Intervention: Prevent Skin Injury  Recent Flowsheet Documentation  Taken 11/11/2023 1603 by Mariza Miller RN  Device Skin Pressure Protection: positioning supports utilized  Intervention: Prevent Infection  Recent Flowsheet Documentation  Taken 11/11/2023 1603 by Mariza Miller RN  Infection Prevention: hand hygiene promoted  Taken 11/11/2023 0844 by Mariza Miller RN  Infection Prevention: hand hygiene promoted  Goal: Optimal Comfort and Wellbeing  Outcome: Progressing  Intervention: Monitor Pain and Promote Comfort  Recent Flowsheet Documentation  Taken 11/11/2023 1724 by Mariza Miller RN  Pain Management Interventions: diversional activity provided  Taken 11/11/2023 1603 by Mariza Miller RN  Pain Management Interventions: diversional activity provided  Taken 11/11/2023 0844 by Mariza Miller RN  Pain Management Interventions: diversional activity provided     Problem: Wound  Goal: Optimal Coping  Outcome: Progressing  Intervention: Support Patient and Family Response  Recent Flowsheet Documentation  Taken 11/11/2023 1603 by Mariza Miller RN  Supportive Measures: active listening utilized  Taken 11/11/2023 0844 by Mariza Miller RN  Supportive Measures: active listening utilized  Goal: Optimal Functional Ability  Outcome: Progressing  Goal: Absence of Infection Signs and Symptoms  Outcome: Progressing  Intervention: Prevent or Manage Infection  Recent Flowsheet Documentation  Taken 11/11/2023 1603 by Mariza Miller RN  Isolation Precautions: contact precautions maintained  Taken 11/11/2023 0844 by Mariza Miller RN  Isolation Precautions: contact precautions maintained  Goal: Optimal Pain Control and Function  Outcome: Progressing  Intervention: Prevent or Manage Pain  Recent Flowsheet Documentation  Taken 11/11/2023  1724 by Mariza Miller RN  Pain Management Interventions: diversional activity provided  Taken 11/11/2023 1603 by Mariza Miller RN  Pain Management Interventions: diversional activity provided  Taken 11/11/2023 0844 by Mariza Miller RN  Pain Management Interventions: diversional activity provided  Goal: Skin Health and Integrity  Outcome: Progressing  Intervention: Optimize Skin Protection  Recent Flowsheet Documentation  Taken 11/11/2023 1603 by Mariza Miller RN  Pressure Reduction Techniques:   frequent weight shift encouraged   heels elevated off bed   positioned off wounds   pressure points protected   weight shift assistance provided  Pressure Reduction Devices:   chair cushion utilized   feet on footrest/footstool   positioning supports utilized   pressure-redistributing mattress utilized   specialty bed utilized  Goal: Optimal Wound Healing  Outcome: Progressing   Goal Outcome Evaluation:         Pt is AOX3, disoriented to situation.  She is pleasant and very involved in her care.  Her baseline pain is 6/10 which is neuropathic pain on both fingers and foot.      Pt was with therapy at 1200.     L BKA wound looks good, moderate drainage with little to no swelling/inflammation, dressing changed at 1600.       She had a good appetite with all meals today.  Continent of bowel but incontinent of bladder.  She is able to make her needs known.      She still has pain at 6/10 per her baseline at the end of the shift. Comfortable and sleeping at 1920.     Mariza Miller RN

## 2023-11-12 LAB
GLUCOSE BLDC GLUCOMTR-MCNC: 135 MG/DL (ref 70–99)
GLUCOSE BLDC GLUCOMTR-MCNC: 143 MG/DL (ref 70–99)
GLUCOSE BLDC GLUCOMTR-MCNC: 151 MG/DL (ref 70–99)
GLUCOSE BLDC GLUCOMTR-MCNC: 182 MG/DL (ref 70–99)
INR PPP: 1.79 (ref 0.85–1.15)

## 2023-11-12 PROCEDURE — 99232 SBSQ HOSP IP/OBS MODERATE 35: CPT | Performed by: STUDENT IN AN ORGANIZED HEALTH CARE EDUCATION/TRAINING PROGRAM

## 2023-11-12 PROCEDURE — 120N000017 HC R&B RESPIRATORY CARE

## 2023-11-12 PROCEDURE — 250N000013 HC RX MED GY IP 250 OP 250 PS 637: Performed by: STUDENT IN AN ORGANIZED HEALTH CARE EDUCATION/TRAINING PROGRAM

## 2023-11-12 PROCEDURE — 85610 PROTHROMBIN TIME: CPT | Performed by: STUDENT IN AN ORGANIZED HEALTH CARE EDUCATION/TRAINING PROGRAM

## 2023-11-12 RX ORDER — WARFARIN SODIUM 4 MG/1
4 TABLET ORAL
Status: COMPLETED | OUTPATIENT
Start: 2023-11-12 | End: 2023-11-12

## 2023-11-12 RX ADMIN — DOCUSATE SODIUM AND SENNOSIDES 4 TABLET: 8.6; 5 TABLET, FILM COATED ORAL at 08:03

## 2023-11-12 RX ADMIN — METHADONE HYDROCHLORIDE 5 MG: 5 TABLET ORAL at 22:20

## 2023-11-12 RX ADMIN — POLYETHYLENE GLYCOL 3350 17 G: 17 POWDER, FOR SOLUTION ORAL at 22:21

## 2023-11-12 RX ADMIN — TORSEMIDE 15 MG: 10 TABLET ORAL at 08:03

## 2023-11-12 RX ADMIN — HYDROCORTISONE: 25 CREAM TOPICAL at 22:28

## 2023-11-12 RX ADMIN — METHADONE HYDROCHLORIDE 5 MG: 5 TABLET ORAL at 08:03

## 2023-11-12 RX ADMIN — ATORVASTATIN CALCIUM 40 MG: 40 TABLET, FILM COATED ORAL at 22:36

## 2023-11-12 RX ADMIN — MICONAZOLE NITRATE: 20 POWDER TOPICAL at 22:23

## 2023-11-12 RX ADMIN — HYDROCORTISONE: 25 CREAM TOPICAL at 08:03

## 2023-11-12 RX ADMIN — INSULIN ASPART 1 UNITS: 100 INJECTION, SOLUTION INTRAVENOUS; SUBCUTANEOUS at 08:02

## 2023-11-12 RX ADMIN — MULTIPLE VITAMINS W/ MINERALS TAB 1 TABLET: TAB at 08:03

## 2023-11-12 RX ADMIN — INSULIN ASPART 1 UNITS: 100 INJECTION, SOLUTION INTRAVENOUS; SUBCUTANEOUS at 11:43

## 2023-11-12 RX ADMIN — POLYETHYLENE GLYCOL 3350 17 G: 17 POWDER, FOR SOLUTION ORAL at 08:02

## 2023-11-12 RX ADMIN — MICONAZOLE NITRATE: 20 POWDER TOPICAL at 08:02

## 2023-11-12 RX ADMIN — METHADONE HYDROCHLORIDE 10 MG: 10 TABLET ORAL at 16:59

## 2023-11-12 RX ADMIN — ASPIRIN 81 MG: 81 TABLET, COATED ORAL at 08:03

## 2023-11-12 RX ADMIN — WARFARIN SODIUM 4 MG: 4 TABLET ORAL at 17:00

## 2023-11-12 RX ADMIN — METHADONE HYDROCHLORIDE 5 MG: 5 TABLET ORAL at 13:26

## 2023-11-12 RX ADMIN — INSULIN DEGLUDEC INJECTION 12 UNITS: 100 INJECTION, SOLUTION SUBCUTANEOUS at 22:19

## 2023-11-12 RX ADMIN — DOCUSATE SODIUM AND SENNOSIDES 4 TABLET: 8.6; 5 TABLET, FILM COATED ORAL at 22:22

## 2023-11-12 ASSESSMENT — ACTIVITIES OF DAILY LIVING (ADL)
ADLS_ACUITY_SCORE: 51
ADLS_ACUITY_SCORE: 48
ADLS_ACUITY_SCORE: 47
ADLS_ACUITY_SCORE: 48
ADLS_ACUITY_SCORE: 49
ADLS_ACUITY_SCORE: 51
ADLS_ACUITY_SCORE: 48
ADLS_ACUITY_SCORE: 51
ADLS_ACUITY_SCORE: 49
ADLS_ACUITY_SCORE: 52

## 2023-11-12 NOTE — PLAN OF CARE
Goal Outcome Evaluation:      Plan of Care Reviewed With: patient    Overall Patient Progress: improvingOverall Patient Progress: improving         Problem: Wound  Goal: Optimal Wound Healing  Outcome: Progressing     Problem: Wound  Goal: Absence of Infection Signs and Symptoms  Intervention: Prevent or Manage Infection  Recent Flowsheet Documentation  Taken 11/12/2023 0800 by Sita Gonzales RN  Isolation Precautions: contact precautions maintained   VSS.  Wound dressing done on day shift. Wound is red with granulation. Dressing change done per ordered directions. No purulent drainage noted at this time. Plan is for pt to speak with Vascular tomorrow morning.      Problem: Pain Acute  Goal: Optimal Pain Control and Function  Outcome: Progressing  Intervention: Develop Pain Management Plan  Recent Flowsheet Documentation  Taken 11/12/2023 0803 by Sita Gonzales RN  Pain Management Interventions:   distraction   emotional support  Intervention: Prevent or Manage Pain  Recent Flowsheet Documentation  Taken 11/12/2023 0800 by Sita Gonzales RN  Medication Review/Management:   medications reviewed   high-risk medications identified   Pt receiving schedule Methadone. No requests for PRN pain meds at this time.    Sita MCCARTY RN 11/12/2023

## 2023-11-12 NOTE — PLAN OF CARE
Problem: Adult Inpatient Plan of Care  Goal: Optimal Comfort and Wellbeing  Outcome: Progressing  Intervention: Monitor Pain and Promote Comfort  Recent Flowsheet Documentation  Taken 11/12/2023 0018 by Ratna Holliday, RN  Pain Management Interventions:   distraction   emotional support  Taken 11/11/2023 2000 by Ratna Holliday, RN  Pain Management Interventions:   distraction   emotional support     Pt resting quietly in bed at start of shift, states pain is consistently 6/10 to L) BKA stump, denies need for PRNs. Pt awake most of shift watching television. Refuses full skin check as states her backside is fine however called later and was willing to have skin checked. Pt c/o Pulsate mattress did not feel right, writer checked pump and while checking pump pt stated the bed seemed to be working better now. Later bed not working again, not inflated fully per patient. Discussed with charge RN. [place] in place with 800 mL urine output. Pt had documented BM yesterday, she was asking writer to make sure it was documented. HS blood sugar 178. /72 at midnight.

## 2023-11-12 NOTE — PROGRESS NOTES
[facility]    Medicine Progress Note - Hospitalist Service    Date of Admission:  10/24/2023    Hospital Course  Linda Loredo is a 78y F PMHx IDDM, chronic venous stasis, chronic pain syndrome, afib, HTN, HLD, CKD3, BERLIN, and FTT who presents to [facility] for ongoing wound care and therapies. Pt initially presented 9/23 to [facility] after welfare check by police. She was found to have L leg swelling and edema and was diagnosed with polymicrobial osteomyelitis. She is s/p debridement by podiatry on 9/26. Bcx negative at the time. ALMA with poor flow and pt was transferred to [facility] for vascular surgery evaluation. Pt managed with meropenem and vancomycin at the time for osteo. 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 Vascular surgery was unable to close the stump at the time and elected for ongoing healing prior to surgical closure thus she was discharged to [facility] for ongoing wound care and therapy.      [facility] Course  10/26-10/30: Bowel regimen uptitrated given pt was impacted on arrival and did not completely empty after initial BM. Good BM by end of weekend. BM 10/29; patient not agreeable to increasing scheduled bowel regimen at this time. RLE with redness below knee, low suspicion of infection, monitoring.   10/31 - 11/5.  Progressing well.  We have had difficulty with constipation.  Has been refusing suppository but now seems agreeable to cathartics and stool softeners.  Has been having minimal bowel movements.  11/5 itchy rash noted on both hands most likely an allergic reaction.  Plan for hydrocortisone cream.  11/6-11/12: Remained constipated, significant increase in bowel regimen, good BM x2 but not empty yet. Started I:C ratio this week with lunch/dinner, dose titrated multiple times this week. Stopped dilaudid PO/IV with wound cares, prn PO remains but pt not using.       Assessment & Plan   Constipation - improving  - good BM on 11/9 and 11/10  -  plan to clean out until loose BM, then back down on regimen. Will still require scheduled regimen in the long term.  - doc-senna 4Q BID (11/6)  - miralax BID (10/26)  - MoM TID scheduled (11/9)  - Viscous lidocaine for anal pain    Skin lesion, right lower extremity, Acute - improving  -Bactroban cream, apply twice daily to affected areas.    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)  S/p Tibial/Fibular Resection and Debridement of Stump (10/17)  Chronic Venous Stasis  - Completed course of meropenem/vancomycin 10/8 for osteomyelitis  - Vascular surgery planning to close in the future, monthly clinic visits for wound checks with closure at unspecified time in the future  - Dilaudid 2mg PO  - WOC consulted and following  - PT/OT    Acute on Chronic Pain Syndrome  - methadone 5mg TID and 10mg @ 1500  - dilaudid 2mg PO  - previous Psych eval PTA, no associated depression    IDDM  - Tresiba 12u at bedtime  - 1:15 insulin:carb ratio with lunch/dinner  - ISS  - Monitor blood glucose with Accu-Cheks and adjust insulin accordingly    Afib  HTN  HLD  -Rate controlled at this time.  -Warfarin, pharmacy to dose  -Continue with statin  -Holding PTA lisinopril 5mg per pt wishes, can restart after discharge    CKD3.  Stable at this time  - baseline Cr 1-1.2  - holding lisinopril  -Monitor with BMP    FTT  - nutrition consult  - SW following    BERLIN  - refusing CPAP      Diet: Consistent Carbohydrate Diet Moderate Consistent Carb (60 g CHO per Meal) Diet  Snacks/Supplements Adult: Glucerna; Between Meals  Snacks/Supplements Adult: Expedite Bottle; With Meals    DVT Prophylaxis: Warfarin  Braga Catheter: Not present  Lines: PRESENT      PICC 10/09/23 Triple Lumen Right  Basilic-Site Assessment: WDL      Cardiac Monitoring: None  Code Status: Full Code      Clinically Significant Risk Factors              # Hypoalbuminemia: Lowest albumin = 3.2 g/dL at 11/1/2023  6:13 AM, will monitor as appropriate     # Hypertension: Noted on problem list       # DMII: A1C = 7.0 % (Ref range: <5.7 %) within past 6 months              Disposition Plan     Expected Discharge Date: 11/14/2023    Discharge Delays: Complex Discharge  *Medically Ready for Discharge  Placement - TCU  Destination: inpatient rehabilitation facility  Discharge Comments: Wound Care-daily. Pain management. - still on IV pain meds prior to wound cares.            Miguel Shannon MD  Hospitalist Service  LTACH  Securely message with [employer] (more info)  Text page via [employer] Paging/[employer]   ______________________________________________________________________    Interval History   - no acute events ovn  - feeling well today  - had small BM y/d  - still agreeable to present regimen to clean out before changing regimen down  - no other acute concerns    Physical Exam   Vital Signs: Temp: 97.9  F (36.6  C) Temp src: Oral BP: (!) 147/72 Pulse: 90   Resp: 22 SpO2: 97 % O2 Device: None (Room air)    Weight: 236 lbs 12.8 oz  Gen: She is a morbidly obese female lying in bed comfortably no distress  HEET: Head is normocephalic atraumatic eyes pupils appear equal round and reactive to light  Heart: She has a good S1 and S2 no obvious murmurs, no JVD   Lungs: She has a normal respiratory effort on auscultation good air entry is noted.   Abdomen: Soft nontender nondistended bowel sounds are noted  Musculoskeletal: She has good muscle tone, below the left knee amputation noted  Skin: Crusted skin lesion on right leg, previous surrounding areas of erythema have resolved.  Erythematous macules noted on both hands.  Please refer to nursing/wound care note for complete skin exam.    Medical Decision Making       40 MINUTES SPENT BY ME on the  date of service doing chart review, history, exam, documentation & further activities per the note.      Data   Lab Results   Component Value Date    WBC 6.4 11/01/2023    WBC 9.4 02/22/2008     Lab Results   Component Value Date    RBC 3.04 11/01/2023    RBC 4.19 02/22/2008     Lab Results   Component Value Date    HGB 9.0 11/01/2023    HGB 12.6 02/22/2008     Lab Results   Component Value Date    HCT 29.3 11/01/2023    HCT 35.6 02/22/2008     Lab Results   Component Value Date    MCV 96 11/01/2023    MCV 85 02/22/2008     Lab Results   Component Value Date    MCH 29.6 11/01/2023    MCH 30.1 02/22/2008     Lab Results   Component Value Date    MCHC 30.7 11/01/2023    MCHC 35.4 02/22/2008     Lab Results   Component Value Date    RDW 14.6 11/01/2023    RDW Test not performed 02/22/2008     Lab Results   Component Value Date     11/01/2023     02/22/2008       Last Comprehensive Metabolic Panel:  Sodium   Date Value Ref Range Status   11/01/2023 140 135 - 145 mmol/L Final     Comment:     Reference intervals for this test were updated on 09/26/2023 to more accurately reflect our healthy population. There may be differences in the flagging of prior results with similar values performed with this method. Interpretation of those prior results can be made in the context of the updated reference intervals.    02/22/2008 140 133 - 144 mmol/L Final     Potassium   Date Value Ref Range Status   11/01/2023 4.4 3.4 - 5.3 mmol/L Final   06/28/2022 4.3 3.5 - 5.0 mmol/L Final   02/22/2008 4.5 3.4 - 5.3 mmol/L Final     Chloride   Date Value Ref Range Status   11/01/2023 100 98 - 107 mmol/L Final   06/28/2022 97 (L) 98 - 107 mmol/L Final   02/22/2008 100 94 - 109 mmol/L Final     Carbon Dioxide   Date Value Ref Range Status   02/22/2008 28 20 - 32 mmol/L Final     Carbon Dioxide (CO2)   Date Value Ref Range Status   11/01/2023 29 22 - 29 mmol/L Final   06/28/2022 29 22 - 31 mmol/L Final     Anion Gap   Date Value Ref  Range Status   11/01/2023 11 7 - 15 mmol/L Final   06/28/2022 13 5 - 18 mmol/L Final   02/22/2008 11 6 - 17 mmol/L Final     Glucose   Date Value Ref Range Status   06/28/2022 73 70 - 125 mg/dL Final   02/22/2008 298 (H) 60 - 99 mg/dL Final     GLUCOSE BY METER POCT   Date Value Ref Range Status   11/11/2023 178 (H) 70 - 99 mg/dL Final     Urea Nitrogen   Date Value Ref Range Status   11/01/2023 18.0 8.0 - 23.0 mg/dL Final   06/28/2022 42 (H) 8 - 28 mg/dL Final   02/22/2008 15 7 - 30 mg/dL Final     Creatinine   Date Value Ref Range Status   11/01/2023 0.96 (H) 0.51 - 0.95 mg/dL Final   02/22/2008 0.82 0.60 - 1.30 mg/dL Final     GFR Estimate   Date Value Ref Range Status   11/01/2023 60 (L) >60 mL/min/1.73m2 Final   02/22/2008 75 >60 mL/min/1.7m2 Final     Calcium   Date Value Ref Range Status   11/01/2023 9.8 8.8 - 10.2 mg/dL Final   02/22/2008 10.4 8.5 - 10.4 mg/dL Final     Bilirubin Total   Date Value Ref Range Status   11/01/2023 0.2 <=1.2 mg/dL Final   02/22/2008 0.4 0.2 - 1.3 mg/dL Final     Alkaline Phosphatase   Date Value Ref Range Status   11/01/2023 97 35 - 104 U/L Final   02/22/2008 138 40 - 150 U/L Final     ALT   Date Value Ref Range Status   11/01/2023 12 0 - 50 U/L Final     Comment:     Reference intervals for this test were updated on 6/12/2023 to more accurately reflect our healthy population. There may be differences in the flagging of prior results with similar values performed with this method. Interpretation of those prior results can be made in the context of the updated reference intervals.     02/22/2008 28 0 - 50 U/L Final     AST   Date Value Ref Range Status   11/01/2023 19 0 - 45 U/L Final     Comment:     Reference intervals for this test were updated on 6/12/2023 to more accurately reflect our healthy population. There may be differences in the flagging of prior results with similar values performed with this method. Interpretation of those prior results can be made in the context  of the updated reference intervals.   02/22/2008 26 0 - 45 U/L Final           I have personally reviewed the following data over the past 24 hrs:    INR:  N/A PTT:  N/A   D-dimer:  N/A Fibrinogen:  N/A       Imaging results reviewed over the past 24 hrs:   No results found for this or any previous visit (from the past 24 hour(s)).

## 2023-11-13 ENCOUNTER — APPOINTMENT (OUTPATIENT)
Dept: PHYSICAL THERAPY | Facility: CLINIC | Age: 78
DRG: 560 | End: 2023-11-13
Attending: INTERNAL MEDICINE
Payer: COMMERCIAL

## 2023-11-13 ENCOUNTER — VIRTUAL VISIT (OUTPATIENT)
Dept: OTHER | Facility: CLINIC | Age: 78
End: 2023-11-13
Payer: COMMERCIAL

## 2023-11-13 DIAGNOSIS — Z89.512 STATUS POST BELOW-KNEE AMPUTATION OF LEFT LOWER EXTREMITY (H): Primary | ICD-10-CM

## 2023-11-13 LAB
GLUCOSE BLDC GLUCOMTR-MCNC: 119 MG/DL (ref 70–99)
GLUCOSE BLDC GLUCOMTR-MCNC: 175 MG/DL (ref 70–99)
GLUCOSE BLDC GLUCOMTR-MCNC: 178 MG/DL (ref 70–99)
GLUCOSE BLDC GLUCOMTR-MCNC: 182 MG/DL (ref 70–99)
INR PPP: 1.93 (ref 0.85–1.15)

## 2023-11-13 PROCEDURE — 120N000017 HC R&B RESPIRATORY CARE

## 2023-11-13 PROCEDURE — 99232 SBSQ HOSP IP/OBS MODERATE 35: CPT | Performed by: HOSPITALIST

## 2023-11-13 PROCEDURE — 99024 POSTOP FOLLOW-UP VISIT: CPT | Mod: 95

## 2023-11-13 PROCEDURE — 85610 PROTHROMBIN TIME: CPT | Performed by: STUDENT IN AN ORGANIZED HEALTH CARE EDUCATION/TRAINING PROGRAM

## 2023-11-13 PROCEDURE — 250N000013 HC RX MED GY IP 250 OP 250 PS 637: Performed by: INTERNAL MEDICINE

## 2023-11-13 PROCEDURE — 250N000013 HC RX MED GY IP 250 OP 250 PS 637: Performed by: STUDENT IN AN ORGANIZED HEALTH CARE EDUCATION/TRAINING PROGRAM

## 2023-11-13 PROCEDURE — 97542 WHEELCHAIR MNGMENT TRAINING: CPT | Mod: GP | Performed by: PHYSICAL THERAPIST

## 2023-11-13 PROCEDURE — 97530 THERAPEUTIC ACTIVITIES: CPT | Mod: GP | Performed by: PHYSICAL THERAPIST

## 2023-11-13 RX ORDER — MINERAL OIL/HYDROPHIL PETROLAT
OINTMENT (GRAM) TOPICAL 2 TIMES DAILY
Status: DISCONTINUED | OUTPATIENT
Start: 2023-11-13 | End: 2023-11-15 | Stop reason: HOSPADM

## 2023-11-13 RX ORDER — WARFARIN SODIUM 4 MG/1
4 TABLET ORAL
Status: COMPLETED | OUTPATIENT
Start: 2023-11-13 | End: 2023-11-13

## 2023-11-13 RX ORDER — LORAZEPAM 0.5 MG/1
0.25 TABLET ORAL
Status: COMPLETED | OUTPATIENT
Start: 2023-11-13 | End: 2023-11-13

## 2023-11-13 RX ADMIN — INSULIN DEGLUDEC INJECTION 12 UNITS: 100 INJECTION, SOLUTION SUBCUTANEOUS at 20:56

## 2023-11-13 RX ADMIN — LORAZEPAM 0.25 MG: 0.5 TABLET ORAL at 03:40

## 2023-11-13 RX ADMIN — ATORVASTATIN CALCIUM 40 MG: 40 TABLET, FILM COATED ORAL at 20:48

## 2023-11-13 RX ADMIN — POLYETHYLENE GLYCOL 3350 17 G: 17 POWDER, FOR SOLUTION ORAL at 20:50

## 2023-11-13 RX ADMIN — MULTIPLE VITAMINS W/ MINERALS TAB 1 TABLET: TAB at 08:21

## 2023-11-13 RX ADMIN — METHADONE HYDROCHLORIDE 5 MG: 5 TABLET ORAL at 12:56

## 2023-11-13 RX ADMIN — WARFARIN SODIUM 4 MG: 4 TABLET ORAL at 17:04

## 2023-11-13 RX ADMIN — INSULIN ASPART 1 UNITS: 100 INJECTION, SOLUTION INTRAVENOUS; SUBCUTANEOUS at 17:42

## 2023-11-13 RX ADMIN — METHADONE HYDROCHLORIDE 5 MG: 5 TABLET ORAL at 20:48

## 2023-11-13 RX ADMIN — MICONAZOLE NITRATE: 20 POWDER TOPICAL at 08:26

## 2023-11-13 RX ADMIN — ASPIRIN 81 MG: 81 TABLET, COATED ORAL at 08:21

## 2023-11-13 RX ADMIN — MICONAZOLE NITRATE: 20 POWDER TOPICAL at 20:52

## 2023-11-13 RX ADMIN — METHADONE HYDROCHLORIDE 5 MG: 5 TABLET ORAL at 08:21

## 2023-11-13 RX ADMIN — HYDROCORTISONE: 25 CREAM TOPICAL at 08:26

## 2023-11-13 RX ADMIN — HYDROCORTISONE: 25 CREAM TOPICAL at 20:53

## 2023-11-13 RX ADMIN — HYDROMORPHONE HYDROCHLORIDE 2 MG: 2 TABLET ORAL at 00:30

## 2023-11-13 RX ADMIN — DOCUSATE SODIUM AND SENNOSIDES 4 TABLET: 8.6; 5 TABLET, FILM COATED ORAL at 08:20

## 2023-11-13 RX ADMIN — POLYETHYLENE GLYCOL 3350 17 G: 17 POWDER, FOR SOLUTION ORAL at 08:20

## 2023-11-13 RX ADMIN — Medication: at 20:53

## 2023-11-13 RX ADMIN — DOCUSATE SODIUM AND SENNOSIDES 4 TABLET: 8.6; 5 TABLET, FILM COATED ORAL at 20:48

## 2023-11-13 RX ADMIN — TORSEMIDE 15 MG: 10 TABLET ORAL at 08:21

## 2023-11-13 RX ADMIN — INSULIN ASPART 1 UNITS: 100 INJECTION, SOLUTION INTRAVENOUS; SUBCUTANEOUS at 12:56

## 2023-11-13 RX ADMIN — METHADONE HYDROCHLORIDE 10 MG: 10 TABLET ORAL at 14:50

## 2023-11-13 ASSESSMENT — ACTIVITIES OF DAILY LIVING (ADL)
ADLS_ACUITY_SCORE: 43
ADLS_ACUITY_SCORE: 45
ADLS_ACUITY_SCORE: 43
ADLS_ACUITY_SCORE: 45
ADLS_ACUITY_SCORE: 43
ADLS_ACUITY_SCORE: 48
ADLS_ACUITY_SCORE: 43

## 2023-11-13 NOTE — PLAN OF CARE
Problem: Adult Inpatient Plan of Care  Goal: Optimal Comfort and Wellbeing  Outcome: Progressing     Problem: Wound  Goal: Optimal Coping  Outcome: Progressing  Intervention: Support Patient and Family Response  Recent Flowsheet Documentation  Taken 11/13/2023 0151 by Angie Mcmahan RN  Supportive Measures: relaxation techniques promoted  Goal: Optimal Functional Ability  Outcome: Progressing  Intervention: Optimize Functional Ability  Recent Flowsheet Documentation  Taken 11/13/2023 0151 by Angie Mcmahan RN  Activity Management: activity adjusted per tolerance  Goal: Absence of Infection Signs and Symptoms  Outcome: Progressing  Intervention: Prevent or Manage Infection  Recent Flowsheet Documentation  Taken 11/13/2023 0151 by Angie Mcmahan RN  Isolation Precautions: contact precautions maintained  Goal: Optimal Pain Control and Function  Outcome: Progressing  Goal: Skin Health and Integrity  Outcome: Progressing  Intervention: Optimize Skin Protection  Recent Flowsheet Documentation  Taken 11/13/2023 0151 by Angie Mcmahan RN  Activity Management: activity adjusted per tolerance  Head of Bed (HOB) Positioning: HOB at 20-30 degrees   Goal Outcome Evaluation:      Pt A&Ox4 . Pt reported anxiety regarding phone call with MD from vascular clinic scheduled for today. Pt Dilaudid 2 mg given for Bilateral  leg pain had also noted to have elevated B/P . Writer to update MD regarding B/P and anxiety.New order for Lorazepam 0.5 mgx1 dose. RX effective for alleviating anxiety and B/P decreased in AM. Continue to monitor.

## 2023-11-13 NOTE — PROGRESS NOTES
Pat is a 78 year old who is being evaluated via a billable telephone visit.      What phone number would you like to be contacted at?   [phone]      How would you like to obtain your AVS? Ana Stacy MA

## 2023-11-13 NOTE — PROGRESS NOTES
Patient requesting ativan for anxiety. On scheduled Methadone and PRN dilaudid    Plans - Ativan x1 dose

## 2023-11-13 NOTE — PLAN OF CARE
Problem: Adult Inpatient Plan of Care  Goal: Absence of Hospital-Acquired Illness or Injury  Intervention: Identify and Manage Fall Risk  Recent Flowsheet Documentation  Taken 11/13/2023 1602 by Neeta Jolley RN  Safety Promotion/Fall Prevention: room door open  Taken 11/13/2023 0900 by Neeta Jolley RN  Safety Promotion/Fall Prevention: room door open     Problem: Wound  Goal: Optimal Coping  Intervention: Support Patient and Family Response  Recent Flowsheet Documentation  Taken 11/13/2023 1602 by Neeta Jolley RN  Supportive Measures: active listening utilized  Taken 11/13/2023 0900 by Neeta Jolley RN  Supportive Measures: active listening utilized  Goal: Optimal Functional Ability  Intervention: Optimize Functional Ability  Recent Flowsheet Documentation  Taken 11/13/2023 1602 by Neeta Jolley RN  Activity Management: bedrest  Activity Assistance Provided: assistance, 1 person  Goal: Skin Health and Integrity  Intervention: Optimize Skin Protection  Recent Flowsheet Documentation  Taken 11/13/2023 1602 by Neeta Jolley RN  Activity Management: bedrest     Problem: Anxiety Signs/Symptoms  Goal: Improved Mood Symptoms (Anxiety Signs/Symptoms)  Intervention: Optimize Emotion and Mood  Recent Flowsheet Documentation  Taken 11/13/2023 1602 by Neeta Jolley RN  Supportive Measures: active listening utilized  Taken 11/13/2023 0900 by Neeta Jolley RN  Supportive Measures: active listening utilized  Goal: Enhanced Social, Occupational or Functional Skills (Anxiety Signs/Symptoms)  Intervention: Promote Social, Occupational and Functional Ability  Recent Flowsheet Documentation  Taken 11/13/2023 1602 by Neeta Jolley RN  Social Functional Ability Promotion: autonomy promoted  Taken 11/13/2023 0900 by Neeta Jolley RN  Social Functional Ability Promotion: autonomy promoted     Problem: Pain Acute  Goal: Optimal Pain Control and Function  Intervention: Prevent  or Manage Pain  Recent Flowsheet Documentation  Taken 11/13/2023 1602 by Neeta Jolley RN  Sensory Stimulation Regulation: television on  Bowel Elimination Promotion: adequate fluid intake promoted  Medication Review/Management: medications reviewed  Taken 11/13/2023 0900 by Neeta Jolley RN  Sensory Stimulation Regulation: television on  Bowel Elimination Promotion: adequate fluid intake promoted  Medication Review/Management: medications reviewed  Intervention: Optimize Psychosocial Wellbeing  Recent Flowsheet Documentation  Taken 11/13/2023 1602 by Neeta Jolley RN  Supportive Measures: active listening utilized  Taken 11/13/2023 0900 by Neeta Jolley RN  Supportive Measures: active listening utilized   Goal Outcome Evaluation:         Patient verbalized feeling anxious this am in anticipation of a phone call from the  vascular surgeon. Patient calm and cooperative after the phone call.Temp: 97.7  F (36.5  C) Temp src: Oral BP: 130/70 Pulse: 83   Resp: 18 SpO2: 98 % O2 Device: None (Room air)   Patient complained of pain throughout the shift, received scheduled Methadone with minimal effect. Patient's wound cares to LLE(AKA) done. Patient currently in bed resting, daughter visited in am.    Neeta Jolley RN

## 2023-11-13 NOTE — PLAN OF CARE
Problem: Adult Inpatient Plan of Care  Goal: Plan of Care Review  Description: The Plan of Care Review/Shift note should be completed every shift.  The Outcome Evaluation is a brief statement about your assessment that the patient is improving, declining, or no change.  This information will be displayed automatically on your shift  note.  Outcome: Not Progressing  Flowsheets (Taken 11/13/2023 0009)  Plan of Care Reviewed With: patient  Overall Patient Progress: improving       Problem: Wound  Goal: Optimal Coping  Outcome: Progressing  Intervention: Support Patient and Family Response  Recent Flowsheet Documentation  Taken 11/12/2023 1655 by Kim Restrepo RN  Supportive Measures: active listening utilized     Problem: Wound  Goal: Improved Oral Intake  Outcome: Progressing     Goal Outcome Evaluation:      Plan of Care Reviewed With: patient    Overall Patient Progress: improvingOverall Patient Progress: improving    Patient is alert and oriented   X 4; able yo make her needs known. Her Vital signs were stable on Room Air. Her BG at  1650 =  135 mg/dL; no correction  dose of Insulin was due.  She  got  Novolog Insulin 1 Unit  for her 21  grams Carbohydrates at supper time.  At  at bedtime  her BG was  182 mg / dL.  No sliding scale of Novolog  Insulin  necessary. She received Tresiba Insulin  12 Units scheduled dose though.  Her  left BKA dressing  fell off and had to be redone as ordered by Providers.  She is on scheduled Methadone for her pain management; and tolerated the dressing change well.  Will keep monitoring patient's progress and safety.

## 2023-11-13 NOTE — PROGRESS NOTES
Margaret oLredo is a 78 year old female for a telephone visit. She underwent first stage BKA with Dr. Otto, however had significant break down of the skin around the BKA resulting in discontinuation of the wound vac.     Today her wound looks significantly improved. We would like photos of the around the wound bed of the skin, but possibly could skin graft once we see pictures of skin grafting within the next week or so. If her LTACH could upload pictures this week of the sreekanth-wound area, can move forward with scheduling.     Total telephone time: 15 minutes    Vanita Carter, CNP

## 2023-11-13 NOTE — PROGRESS NOTES
[facility]    Medicine Progress Note - Hospitalist Service    Date of Admission:  10/24/2023    Hospital Course  Linda Loredo is a 78y F PMHx IDDM, chronic venous stasis, chronic pain syndrome, afib, HTN, HLD, CKD3, BERLIN, and FTT who presents to [facility] for ongoing wound care and therapies. Pt initially presented 9/23 to [facility] after welfare check by police. She was found to have L leg swelling and edema and was diagnosed with polymicrobial osteomyelitis. She is s/p debridement by podiatry on 9/26. Bcx negative at the time. ALMA with poor flow and pt was transferred to [facility] for vascular surgery evaluation. Pt managed with meropenem and vancomycin at the time for osteo. 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 Vascular surgery was unable to close the stump at the time and elected for ongoing healing prior to surgical closure thus she was discharged to [facility] for ongoing wound care and therapy.      [facility] Course  10/26-10/30: Bowel regimen uptitrated given pt was impacted on arrival and did not completely empty after initial BM. Good BM by end of weekend. BM 10/29; patient not agreeable to increasing scheduled bowel regimen at this time. RLE with redness below knee, low suspicion of infection, monitoring.   10/31.  Progressing well.  Plan for topical Bactroban cream for right lower extremity lesion otherwise no new other changes.  11/1. Hasn't had a definitive bowel movement, patient refusing suppository. Otherwise, just about the same compared to yesterday  11/2.  Patient reports small bowel movements this morning that is corroborated with RN.  Otherwise she is progressing well with wound care and therapies.  No new changes at this time  11/3.  Doing well with wound care.  Skin lesion healing and patient reports bowel movement.  Continue current medical management.    10/31 - 11/5.  Progressing well.  We have had difficulty with constipation.  Has been  refusing suppository but now seems agreeable to cathartics and stool softeners.  Has been having minimal bowel movements.  11/5 itchy rash noted on both hands most likely an allergic reaction.  Plan for hydrocortisone cream  11/6-11/12: Remained constipated, significant increase in bowel regimen, good BM x2 but not empty yet. Started I:C ratio this week with lunch/dinner, dose titrated multiple times this week. Stopped dilaudid PO/IV with wound cares, prn PO remains but pt not using.   11/13.  Progressing well.  Continue current medical management.  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)  S/p Tibial/Fibular Resection and Debridement of Stump (10/17)  Chronic Venous Stasis  - Completed course of meropenem/vancomycin 10/8 for osteomyelitis  - Vascular surgery planning to close in the future, monthly clinic visits for wound checks with closure at unspecified time in the future  - Dilaudid 4mg PO and 0.3mg IV prn pain control (PO 30min prior and IV 15min prior to dressing changes)  - WOC consulted and following  - PT/OT    Constipation   -improving  - good BM on 11/9 and 11/10   - manual disimpaction (10/25)  - dulcolax 10mg x1, later 20mg x1 (10/25)  - fleet enema (10/25)  - doc-senna 2Q BID (with additional 2Q BID prn)  - miralax BID (10/26)  - MoM TID prn  - Viscous lidocaine for anal pain    Acute on Chronic Pain Syndrome  - methadone 5mg TID and 10mg @ 1500  - Dilaudid IV/PO prn pain and with dressing changes  - previous Psych eval PTA, no associated depression    IDDM  - Tresiba 12u qHS,   - ISS  - Monitor blood glucose with Accu-Cheks and adjust insulin accordingly    Afib  HTN  HLD  -Rate controlled at this time.  -Warfarin, pharmacy to dose  -Continue with statin  -Holding PTA lisinopril 5mg per pt wishes, can restart after discharge    CKD3.  Stable at this time  - baseline Cr 1-1.2  -  holding lisinopril  -Monitor with BMP    FTT  - nutrition consult  - SW following    BERLIN  - refusing CPAP    Skin lesion, right lower extremity.  stable    -Improving  -Bactroban cream, apply twice daily to affected areas.    Dermatitis on hands, resolved. most likely allergic reaction.    -s/p hydrocortisone cream 2.5% topically twice daily        Diet: Consistent Carbohydrate Diet Moderate Consistent Carb (60 g CHO per Meal) Diet  Snacks/Supplements Adult: Glucerna; Between Meals  Snacks/Supplements Adult: Expedite Bottle; With Meals    DVT Prophylaxis: Warfarin  Braga Catheter: Not present  Lines: PRESENT      PICC 10/09/23 Triple Lumen Right Basilic-Site Assessment: WDL      Cardiac Monitoring: None  Code Status: Full Code      Clinically Significant Risk Factors              # Hypoalbuminemia: Lowest albumin = 3.2 g/dL at 11/1/2023  6:13 AM, will monitor as appropriate     # Hypertension: Noted on problem list       # DMII: A1C = 7.0 % (Ref range: <5.7 %) within past 6 months              Disposition Plan      Expected Discharge Date: 11/15/2023    Discharge Delays: Complex Discharge  *Medically Ready for Discharge  Placement - TCU  Destination: inpatient rehabilitation facility  Discharge Comments: Wound Care-daily. Pain management. - still on IV pain meds prior to wound cares.            Claude Rowe MD  Hospitalist Service  [facility]  Securely message with [employer] (more info)  Text page via [employer] Paging/Directory   ______________________________________________________________________    Interval History   No new events overnight. Patient in bed comfortably, daughter at bedside. States she is progressing well. No new complaints at this time    Physical Exam   Vital Signs: Temp: 97.7  F (36.5  C) Temp src: Oral BP: 130/70 Pulse: 83   Resp: 18 SpO2: 98 % O2 Device: None (Room air)    Weight: 230 lbs 3.2 oz  Gen: She is a morbidly obese female lying in bed comfortably no distress  HEET: Head is normocephalic  atraumatic eyes pupils appear equal round and reactive to light  Heart: She has a good S1 and S2 no obvious murmurs, no JVD   Lungs: She has a normal respiratory effort on auscultation good air entry is noted.   Abdomen: Soft nontender nondistended bowel sounds are noted  Musculoskeletal: She has good muscle tone, below the left knee amputation noted  Skin: Crusted skin lesion on right leg, previous surrounding areas of erythema have resolved.  Erythematous macules noted on both hands.  Please refer to nursing/wound care note for complete skin exam.    Medical Decision Making       40 MINUTES SPENT BY ME on the date of service doing chart review, history, exam, documentation & further activities per the note.      Data   Lab Results   Component Value Date    WBC 6.4 11/01/2023    WBC 9.4 02/22/2008     Lab Results   Component Value Date    RBC 3.04 11/01/2023    RBC 4.19 02/22/2008     Lab Results   Component Value Date    HGB 9.0 11/01/2023    HGB 12.6 02/22/2008     Lab Results   Component Value Date    HCT 29.3 11/01/2023    HCT 35.6 02/22/2008     Lab Results   Component Value Date    MCV 96 11/01/2023    MCV 85 02/22/2008     Lab Results   Component Value Date    MCH 29.6 11/01/2023    MCH 30.1 02/22/2008     Lab Results   Component Value Date    MCHC 30.7 11/01/2023    MCHC 35.4 02/22/2008     Lab Results   Component Value Date    RDW 14.6 11/01/2023    RDW Test not performed 02/22/2008     Lab Results   Component Value Date     11/01/2023     02/22/2008       Last Comprehensive Metabolic Panel:  Sodium   Date Value Ref Range Status   11/01/2023 140 135 - 145 mmol/L Final     Comment:     Reference intervals for this test were updated on 09/26/2023 to more accurately reflect our healthy population. There may be differences in the flagging of prior results with similar values performed with this method. Interpretation of those prior results can be made in the context of the updated reference  intervals.    02/22/2008 140 133 - 144 mmol/L Final     Potassium   Date Value Ref Range Status   11/01/2023 4.4 3.4 - 5.3 mmol/L Final   06/28/2022 4.3 3.5 - 5.0 mmol/L Final   02/22/2008 4.5 3.4 - 5.3 mmol/L Final     Chloride   Date Value Ref Range Status   11/01/2023 100 98 - 107 mmol/L Final   06/28/2022 97 (L) 98 - 107 mmol/L Final   02/22/2008 100 94 - 109 mmol/L Final     Carbon Dioxide   Date Value Ref Range Status   02/22/2008 28 20 - 32 mmol/L Final     Carbon Dioxide (CO2)   Date Value Ref Range Status   11/01/2023 29 22 - 29 mmol/L Final   06/28/2022 29 22 - 31 mmol/L Final     Anion Gap   Date Value Ref Range Status   11/01/2023 11 7 - 15 mmol/L Final   06/28/2022 13 5 - 18 mmol/L Final   02/22/2008 11 6 - 17 mmol/L Final     Glucose   Date Value Ref Range Status   06/28/2022 73 70 - 125 mg/dL Final   02/22/2008 298 (H) 60 - 99 mg/dL Final     GLUCOSE BY METER POCT   Date Value Ref Range Status   11/13/2023 182 (H) 70 - 99 mg/dL Final     Urea Nitrogen   Date Value Ref Range Status   11/01/2023 18.0 8.0 - 23.0 mg/dL Final   06/28/2022 42 (H) 8 - 28 mg/dL Final   02/22/2008 15 7 - 30 mg/dL Final     Creatinine   Date Value Ref Range Status   11/01/2023 0.96 (H) 0.51 - 0.95 mg/dL Final   02/22/2008 0.82 0.60 - 1.30 mg/dL Final     GFR Estimate   Date Value Ref Range Status   11/01/2023 60 (L) >60 mL/min/1.73m2 Final   02/22/2008 75 >60 mL/min/1.7m2 Final     Calcium   Date Value Ref Range Status   11/01/2023 9.8 8.8 - 10.2 mg/dL Final   02/22/2008 10.4 8.5 - 10.4 mg/dL Final     Bilirubin Total   Date Value Ref Range Status   11/01/2023 0.2 <=1.2 mg/dL Final   02/22/2008 0.4 0.2 - 1.3 mg/dL Final     Alkaline Phosphatase   Date Value Ref Range Status   11/01/2023 97 35 - 104 U/L Final   02/22/2008 138 40 - 150 U/L Final     ALT   Date Value Ref Range Status   11/01/2023 12 0 - 50 U/L Final     Comment:     Reference intervals for this test were updated on 6/12/2023 to more accurately reflect our healthy  population. There may be differences in the flagging of prior results with similar values performed with this method. Interpretation of those prior results can be made in the context of the updated reference intervals.     02/22/2008 28 0 - 50 U/L Final     AST   Date Value Ref Range Status   11/01/2023 19 0 - 45 U/L Final     Comment:     Reference intervals for this test were updated on 6/12/2023 to more accurately reflect our healthy population. There may be differences in the flagging of prior results with similar values performed with this method. Interpretation of those prior results can be made in the context of the updated reference intervals.   02/22/2008 26 0 - 45 U/L Final           I have personally reviewed the following data over the past 24 hrs:    INR:  1.93 (H) PTT:  N/A   D-dimer:  N/A Fibrinogen:  N/A       Imaging results reviewed over the past 24 hrs:   No results found for this or any previous visit (from the past 24 hour(s)).

## 2023-11-14 ENCOUNTER — APPOINTMENT (OUTPATIENT)
Dept: PHYSICAL THERAPY | Facility: CLINIC | Age: 78
DRG: 560 | End: 2023-11-14
Attending: INTERNAL MEDICINE
Payer: COMMERCIAL

## 2023-11-14 LAB
GLUCOSE BLDC GLUCOMTR-MCNC: 142 MG/DL (ref 70–99)
GLUCOSE BLDC GLUCOMTR-MCNC: 143 MG/DL (ref 70–99)
GLUCOSE BLDC GLUCOMTR-MCNC: 184 MG/DL (ref 70–99)
GLUCOSE BLDC GLUCOMTR-MCNC: 224 MG/DL (ref 70–99)
INR PPP: 2.26 (ref 0.85–1.15)

## 2023-11-14 PROCEDURE — 99232 SBSQ HOSP IP/OBS MODERATE 35: CPT | Performed by: HOSPITALIST

## 2023-11-14 PROCEDURE — 97530 THERAPEUTIC ACTIVITIES: CPT | Mod: GP | Performed by: PHYSICAL THERAPIST

## 2023-11-14 PROCEDURE — 85610 PROTHROMBIN TIME: CPT | Performed by: STUDENT IN AN ORGANIZED HEALTH CARE EDUCATION/TRAINING PROGRAM

## 2023-11-14 PROCEDURE — 120N000017 HC R&B RESPIRATORY CARE

## 2023-11-14 PROCEDURE — 250N000013 HC RX MED GY IP 250 OP 250 PS 637: Performed by: STUDENT IN AN ORGANIZED HEALTH CARE EDUCATION/TRAINING PROGRAM

## 2023-11-14 PROCEDURE — 97542 WHEELCHAIR MNGMENT TRAINING: CPT | Mod: GP | Performed by: PHYSICAL THERAPIST

## 2023-11-14 RX ORDER — WARFARIN SODIUM 2 MG/1
2 TABLET ORAL
Status: COMPLETED | OUTPATIENT
Start: 2023-11-14 | End: 2023-11-14

## 2023-11-14 RX ADMIN — POLYETHYLENE GLYCOL 3350 17 G: 17 POWDER, FOR SOLUTION ORAL at 09:07

## 2023-11-14 RX ADMIN — INSULIN ASPART 1 UNITS: 100 INJECTION, SOLUTION INTRAVENOUS; SUBCUTANEOUS at 12:48

## 2023-11-14 RX ADMIN — HYDROCORTISONE: 25 CREAM TOPICAL at 09:08

## 2023-11-14 RX ADMIN — METHADONE HYDROCHLORIDE 10 MG: 10 TABLET ORAL at 16:01

## 2023-11-14 RX ADMIN — INSULIN ASPART 1 UNITS: 100 INJECTION, SOLUTION INTRAVENOUS; SUBCUTANEOUS at 09:07

## 2023-11-14 RX ADMIN — METHADONE HYDROCHLORIDE 5 MG: 5 TABLET ORAL at 09:06

## 2023-11-14 RX ADMIN — DOCUSATE SODIUM AND SENNOSIDES 4 TABLET: 8.6; 5 TABLET, FILM COATED ORAL at 09:06

## 2023-11-14 RX ADMIN — ATORVASTATIN CALCIUM 40 MG: 40 TABLET, FILM COATED ORAL at 20:47

## 2023-11-14 RX ADMIN — INSULIN DEGLUDEC INJECTION 12 UNITS: 100 INJECTION, SOLUTION SUBCUTANEOUS at 20:47

## 2023-11-14 RX ADMIN — METHADONE HYDROCHLORIDE 5 MG: 5 TABLET ORAL at 12:49

## 2023-11-14 RX ADMIN — TORSEMIDE 15 MG: 10 TABLET ORAL at 09:06

## 2023-11-14 RX ADMIN — METHADONE HYDROCHLORIDE 5 MG: 5 TABLET ORAL at 20:47

## 2023-11-14 RX ADMIN — MULTIPLE VITAMINS W/ MINERALS TAB 1 TABLET: TAB at 09:06

## 2023-11-14 RX ADMIN — ASPIRIN 81 MG: 81 TABLET, COATED ORAL at 09:07

## 2023-11-14 RX ADMIN — Medication: at 09:08

## 2023-11-14 RX ADMIN — HYDROCORTISONE: 25 CREAM TOPICAL at 20:49

## 2023-11-14 RX ADMIN — MICONAZOLE NITRATE: 20 POWDER TOPICAL at 09:08

## 2023-11-14 RX ADMIN — INSULIN ASPART 1 UNITS: 100 INJECTION, SOLUTION INTRAVENOUS; SUBCUTANEOUS at 17:56

## 2023-11-14 RX ADMIN — MICONAZOLE NITRATE: 20 POWDER TOPICAL at 20:48

## 2023-11-14 RX ADMIN — WARFARIN SODIUM 2 MG: 2 TABLET ORAL at 17:56

## 2023-11-14 ASSESSMENT — ACTIVITIES OF DAILY LIVING (ADL)
ADLS_ACUITY_SCORE: 45

## 2023-11-14 NOTE — PLAN OF CARE
Problem: Wound  Goal: Optimal Coping  Outcome: Progressing     Problem: Adult Inpatient Plan of Care  Goal: Plan of Care Review  Description: The Plan of Care Review/Shift note should be completed every shift.  The Outcome Evaluation is a brief statement about your assessment that the patient is improving, declining, or no change.  This information will be displayed automatically on your shift  note.  Outcome: Progressing   Goal Outcome Evaluation:         Patient alert and cooperative; pleasant on approach. Ate well at meals; continues on I:C coverage. C/O pain 6/10 to left BKA site, declined prn. Wound dressing changed per WOC orders. OOB in afternoon and tolerated well. Uneventful shift; plan of care ongoing. /71 (BP Location: Left arm)   Pulse 87   Temp 99  F (37.2  C) (Oral)   Resp 20   Wt 104.4 kg (230 lb 3.2 oz)   SpO2 96%   BMI 37.16 kg/m     Gila Robins RN

## 2023-11-14 NOTE — PROGRESS NOTES
[facility]    Medicine Progress Note - Hospitalist Service    Date of Admission:  10/24/2023    Hospital Course  Linda Loredo is a 78y F PMHx IDDM, chronic venous stasis, chronic pain syndrome, afib, HTN, HLD, CKD3, BERLIN, and FTT who presents to [facility] for ongoing wound care and therapies. Pt initially presented 9/23 to [facility] after welfare check by police. She was found to have L leg swelling and edema and was diagnosed with polymicrobial osteomyelitis. She is s/p debridement by podiatry on 9/26. Bcx negative at the time. ALMA with poor flow and pt was transferred to [facility] for vascular surgery evaluation. Pt managed with meropenem and vancomycin at the time for osteo. 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 Vascular surgery was unable to close the stump at the time and elected for ongoing healing prior to surgical closure thus she was discharged to [facility] for ongoing wound care and therapy.      [facility] Course  10/26-10/30: Bowel regimen uptitrated given pt was impacted on arrival and did not completely empty after initial BM. Good BM by end of weekend. BM 10/29; patient not agreeable to increasing scheduled bowel regimen at this time. RLE with redness below knee, low suspicion of infection, monitoring.   10/31.  Progressing well.  Plan for topical Bactroban cream for right lower extremity lesion otherwise no new other changes.  11/1. Hasn't had a definitive bowel movement, patient refusing suppository. Otherwise, just about the same compared to yesterday  11/2.  Patient reports small bowel movements this morning that is corroborated with RN.  Otherwise she is progressing well with wound care and therapies.  No new changes at this time  11/3.  Doing well with wound care.  Skin lesion healing and patient reports bowel movement.  Continue current medical management.    10/31 - 11/5.  Progressing well.  We have had difficulty with constipation.  Has been  refusing suppository but now seems agreeable to cathartics and stool softeners.  Has been having minimal bowel movements.  11/5 itchy rash noted on both hands most likely an allergic reaction.  Plan for hydrocortisone cream  11/6-11/12: Remained constipated, significant increase in bowel regimen, good BM x2 but not empty yet. Started I:C ratio this week with lunch/dinner, dose titrated multiple times this week. Stopped dilaudid PO/IV with wound cares, prn PO remains but pt not using.   11/13.  Progressing well.  Continue current medical management.  No new changes at this time  11/14. Continues to progress well. May discharge tomorrow. 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)  S/p Tibial/Fibular Resection and Debridement of Stump (10/17)  Chronic Venous Stasis  - Completed course of meropenem/vancomycin 10/8 for osteomyelitis  - Vascular surgery planning to close in the future, monthly clinic visits for wound checks with closure at unspecified time in the future  - Dilaudid 4mg PO and 0.3mg IV prn pain control (PO 30min prior and IV 15min prior to dressing changes)  - WOC consulted and following  - PT/OT    Constipation   -improving  - good BM on 11/9 and 11/10   - manual disimpaction (10/25)  - dulcolax 10mg x1, later 20mg x1 (10/25)  - fleet enema (10/25)  - doc-senna 2Q BID (with additional 2Q BID prn)  - miralax BID (10/26)  - MoM TID prn  - Viscous lidocaine for anal pain    Acute on Chronic Pain Syndrome  - methadone 5mg TID and 10mg @ 1500  - Dilaudid IV/PO prn pain and with dressing changes  - previous Psych eval PTA, no associated depression    IDDM  - Tresiba 12u qHS,   - ISS  - Monitor blood glucose with Accu-Cheks and adjust insulin accordingly    Afib  HTN  HLD  -Rate controlled at this time.  -Warfarin, pharmacy to dose  -Continue with statin  -Holding PTA lisinopril  5mg per pt wishes, can restart after discharge    CKD3.  Stable at this time  - baseline Cr 1-1.2  - holding lisinopril  -Monitor with BMP    FTT  - nutrition consult  - SW following    BERLIN  - refusing CPAP    Skin lesion, right lower extremity.  stable    -Improving  -Bactroban cream, apply twice daily to affected areas.    Dermatitis on hands, resolved. most likely allergic reaction.    -s/p hydrocortisone cream 2.5% topically twice daily        Diet: Consistent Carbohydrate Diet Moderate Consistent Carb (60 g CHO per Meal) Diet  Snacks/Supplements Adult: Glucerna; Between Meals  Snacks/Supplements Adult: Expedite Bottle; With Meals    DVT Prophylaxis: Warfarin  Braga Catheter: Not present  Lines: PRESENT      PICC 10/09/23 Triple Lumen Right Basilic-Site Assessment: WDL      Cardiac Monitoring: None  Code Status: Full Code      Clinically Significant Risk Factors              # Hypoalbuminemia: Lowest albumin = 3.2 g/dL at 11/1/2023  6:13 AM, will monitor as appropriate     # Hypertension: Noted on problem list       # DMII: A1C = 7.0 % (Ref range: <5.7 %) within past 6 months              Disposition Plan      Expected Discharge Date: 11/15/2023    Discharge Delays: Complex Discharge  *Medically Ready for Discharge  Placement - TCU  Destination: inpatient rehabilitation facility  Discharge Comments: Wound Care-daily. Pain management. - on oral pain meds          Claude Rowe MD  Hospitalist Service  [facility]  Securely message with [employer] (more info)  Text page via [employer] Paging/Directory   ______________________________________________________________________    Interval History   Patient is resting in bed comfortably. She states she is doing well. Reports no new complaints at this time    Physical Exam   Vital Signs: Temp: 99  F (37.2  C) Temp src: Oral BP: 132/71 Pulse: 87   Resp: 20 SpO2: 96 % O2 Device: None (Room air)    Weight: 230 lbs 3.2 oz  Gen: She is a morbidly obese female lying in bed comfortably no  distress  HEET: Head is normocephalic atraumatic eyes pupils appear equal round and reactive to light  Heart: She has a good S1 and S2 no obvious murmurs, no JVD   Lungs: She has a normal respiratory effort on auscultation good air entry is noted.   Abdomen: Soft nontender nondistended bowel sounds are noted  Musculoskeletal: She has good muscle tone, below the left knee amputation noted  Skin: Crusted skin lesion on right leg, previous surrounding areas of erythema have resolved.   Please refer to nursing/wound care note for complete skin exam.    Medical Decision Making       40 MINUTES SPENT BY ME on the date of service doing chart review, history, exam, documentation & further activities per the note.      Data   Lab Results   Component Value Date    WBC 6.4 11/01/2023    WBC 9.4 02/22/2008     Lab Results   Component Value Date    RBC 3.04 11/01/2023    RBC 4.19 02/22/2008     Lab Results   Component Value Date    HGB 9.0 11/01/2023    HGB 12.6 02/22/2008     Lab Results   Component Value Date    HCT 29.3 11/01/2023    HCT 35.6 02/22/2008     Lab Results   Component Value Date    MCV 96 11/01/2023    MCV 85 02/22/2008     Lab Results   Component Value Date    MCH 29.6 11/01/2023    MCH 30.1 02/22/2008     Lab Results   Component Value Date    MCHC 30.7 11/01/2023    MCHC 35.4 02/22/2008     Lab Results   Component Value Date    RDW 14.6 11/01/2023    RDW Test not performed 02/22/2008     Lab Results   Component Value Date     11/01/2023     02/22/2008       Last Comprehensive Metabolic Panel:  Sodium   Date Value Ref Range Status   11/01/2023 140 135 - 145 mmol/L Final     Comment:     Reference intervals for this test were updated on 09/26/2023 to more accurately reflect our healthy population. There may be differences in the flagging of prior results with similar values performed with this method. Interpretation of those prior results can be made in the context of the updated reference  intervals.    02/22/2008 140 133 - 144 mmol/L Final     Potassium   Date Value Ref Range Status   11/01/2023 4.4 3.4 - 5.3 mmol/L Final   06/28/2022 4.3 3.5 - 5.0 mmol/L Final   02/22/2008 4.5 3.4 - 5.3 mmol/L Final     Chloride   Date Value Ref Range Status   11/01/2023 100 98 - 107 mmol/L Final   06/28/2022 97 (L) 98 - 107 mmol/L Final   02/22/2008 100 94 - 109 mmol/L Final     Carbon Dioxide   Date Value Ref Range Status   02/22/2008 28 20 - 32 mmol/L Final     Carbon Dioxide (CO2)   Date Value Ref Range Status   11/01/2023 29 22 - 29 mmol/L Final   06/28/2022 29 22 - 31 mmol/L Final     Anion Gap   Date Value Ref Range Status   11/01/2023 11 7 - 15 mmol/L Final   06/28/2022 13 5 - 18 mmol/L Final   02/22/2008 11 6 - 17 mmol/L Final     Glucose   Date Value Ref Range Status   06/28/2022 73 70 - 125 mg/dL Final   02/22/2008 298 (H) 60 - 99 mg/dL Final     GLUCOSE BY METER POCT   Date Value Ref Range Status   11/14/2023 184 (H) 70 - 99 mg/dL Final     Urea Nitrogen   Date Value Ref Range Status   11/01/2023 18.0 8.0 - 23.0 mg/dL Final   06/28/2022 42 (H) 8 - 28 mg/dL Final   02/22/2008 15 7 - 30 mg/dL Final     Creatinine   Date Value Ref Range Status   11/01/2023 0.96 (H) 0.51 - 0.95 mg/dL Final   02/22/2008 0.82 0.60 - 1.30 mg/dL Final     GFR Estimate   Date Value Ref Range Status   11/01/2023 60 (L) >60 mL/min/1.73m2 Final   02/22/2008 75 >60 mL/min/1.7m2 Final     Calcium   Date Value Ref Range Status   11/01/2023 9.8 8.8 - 10.2 mg/dL Final   02/22/2008 10.4 8.5 - 10.4 mg/dL Final     Bilirubin Total   Date Value Ref Range Status   11/01/2023 0.2 <=1.2 mg/dL Final   02/22/2008 0.4 0.2 - 1.3 mg/dL Final     Alkaline Phosphatase   Date Value Ref Range Status   11/01/2023 97 35 - 104 U/L Final   02/22/2008 138 40 - 150 U/L Final     ALT   Date Value Ref Range Status   11/01/2023 12 0 - 50 U/L Final     Comment:     Reference intervals for this test were updated on 6/12/2023 to more accurately reflect our healthy  population. There may be differences in the flagging of prior results with similar values performed with this method. Interpretation of those prior results can be made in the context of the updated reference intervals.     02/22/2008 28 0 - 50 U/L Final     AST   Date Value Ref Range Status   11/01/2023 19 0 - 45 U/L Final     Comment:     Reference intervals for this test were updated on 6/12/2023 to more accurately reflect our healthy population. There may be differences in the flagging of prior results with similar values performed with this method. Interpretation of those prior results can be made in the context of the updated reference intervals.   02/22/2008 26 0 - 45 U/L Final           I have personally reviewed the following data over the past 24 hrs:    INR:  2.26 (H) PTT:  N/A   D-dimer:  N/A Fibrinogen:  N/A       Imaging results reviewed over the past 24 hrs:   No results found for this or any previous visit (from the past 24 hour(s)).

## 2023-11-14 NOTE — PROGRESS NOTES
Care Management Follow Up    Length of Stay (days): 21    Expected Discharge Date: 11/15/23     Concerns to be Addressed:       Patient plan of care discussed at interdisciplinary rounds: Yes    Anticipated Discharge Disposition: Transitional Care, [facility]     Anticipated Discharge Services: Transportation Services  Anticipated Discharge DME: Wheelchair    Patient/family educated on Medicare website which has current facility and service quality ratings:    Education Provided on the Discharge Plan: Yes  Patient/Family in Agreement with the Plan:      Referrals Placed by CM/SW:    Private pay costs discussed: Not applicable    Additional Information:    Patient discussed at IDT rounds and chart reviewed.  Planned discharge has been moved to tomorrow am.  Dr Rowe has been informed that patient will need the paper scripts for her pain meds.  PAS form was completed today and updated to flowsheet.   Patient is aware of her discharge to [facility] tomorrow.      Steffanie Barrera, RN, BSN  Care Coordination  [facility]  [phone]

## 2023-11-14 NOTE — PLAN OF CARE
Problem: Pain Acute  Goal: Optimal Pain Control and Function  Outcome: Progressing  Intervention: Prevent or Manage Pain  Recent Flowsheet Documentation  Taken 11/14/2023 0128 by Susie Shaw, RN  Sensory Stimulation Regulation: lighting decreased  Bowel Elimination Promotion: adequate fluid intake promoted  Medication Review/Management: medications reviewed  Intervention: Optimize Psychosocial Wellbeing  Recent Flowsheet Documentation  Taken 11/14/2023 0128 by Susie Shaw, RN  Supportive Measures: active listening utilized   Patient slept well and does not want to be disturbed at night. She received her scheduled methadone at  and states that she always have pain on her extremities with rate of 6/10 and the methadone helps relieves it. Vitals stable last night. Last BM recorded 11/11/23. She received her scheduled senna and miralax last evening. Will offer prn dulcolax suppository today. Will continue to monitor.    BP (!) 141/61 (BP Location: Other (Comment), Patient Position: Semi-Munguia's)   Pulse 66   Temp 98.6  F (37  C) (Oral)   Resp 20   Wt 104.4 kg (230 lb 3.2 oz)   SpO2 96%   BMI 37.16 kg/m

## 2023-11-15 ENCOUNTER — LAB REQUISITION (OUTPATIENT)
Dept: LAB | Facility: CLINIC | Age: 78
End: 2023-11-15
Payer: COMMERCIAL

## 2023-11-15 VITALS
TEMPERATURE: 98.8 F | OXYGEN SATURATION: 97 % | HEART RATE: 72 BPM | WEIGHT: 230.2 LBS | DIASTOLIC BLOOD PRESSURE: 80 MMHG | SYSTOLIC BLOOD PRESSURE: 149 MMHG | RESPIRATION RATE: 20 BRPM | BODY MASS INDEX: 37.16 KG/M2

## 2023-11-15 DIAGNOSIS — M86.8X9: ICD-10-CM

## 2023-11-15 DIAGNOSIS — E11.8 TYPE 2 DIABETES MELLITUS WITH UNSPECIFIED COMPLICATIONS (H): ICD-10-CM

## 2023-11-15 DIAGNOSIS — Z11.1 ENCOUNTER FOR SCREENING FOR RESPIRATORY TUBERCULOSIS: ICD-10-CM

## 2023-11-15 LAB
GLUCOSE BLDC GLUCOMTR-MCNC: 141 MG/DL (ref 70–99)
INR PPP: 2.45 (ref 0.85–1.15)

## 2023-11-15 PROCEDURE — 250N000013 HC RX MED GY IP 250 OP 250 PS 637: Performed by: STUDENT IN AN ORGANIZED HEALTH CARE EDUCATION/TRAINING PROGRAM

## 2023-11-15 PROCEDURE — 99239 HOSP IP/OBS DSCHRG MGMT >30: CPT | Performed by: HOSPITALIST

## 2023-11-15 PROCEDURE — 85610 PROTHROMBIN TIME: CPT | Performed by: STUDENT IN AN ORGANIZED HEALTH CARE EDUCATION/TRAINING PROGRAM

## 2023-11-15 RX ORDER — BISACODYL 10 MG
10 SUPPOSITORY, RECTAL RECTAL DAILY PRN
Qty: 30 SUPPOSITORY | Refills: 0 | Status: SHIPPED | OUTPATIENT
Start: 2023-11-15 | End: 2024-01-08

## 2023-11-15 RX ORDER — METHADONE HYDROCHLORIDE 5 MG/1
5 TABLET ORAL 3 TIMES DAILY
Qty: 30 TABLET | Refills: 0 | Status: SHIPPED | OUTPATIENT
Start: 2023-11-15 | End: 2023-11-17

## 2023-11-15 RX ORDER — HYDROMORPHONE HYDROCHLORIDE 2 MG/1
2 TABLET ORAL EVERY 4 HOURS PRN
Qty: 21 TABLET | Refills: 0 | Status: SHIPPED | OUTPATIENT
Start: 2023-11-15 | End: 2023-11-17

## 2023-11-15 RX ORDER — HYDROCORTISONE 2.5 %
CREAM (GRAM) TOPICAL 2 TIMES DAILY
Qty: 30 G | Refills: 0 | Status: SHIPPED | OUTPATIENT
Start: 2023-11-15 | End: 2023-11-27

## 2023-11-15 RX ORDER — AMOXICILLIN 250 MG
4 CAPSULE ORAL 2 TIMES DAILY
Qty: 240 TABLET | Refills: 0 | Status: SHIPPED | OUTPATIENT
Start: 2023-11-15 | End: 2023-11-20

## 2023-11-15 RX ORDER — WARFARIN SODIUM 2.5 MG/1
2.5 TABLET ORAL DAILY
Qty: 30 TABLET | Refills: 0 | Status: SHIPPED | OUTPATIENT
Start: 2023-11-15 | End: 2023-11-17

## 2023-11-15 RX ORDER — METHADONE HYDROCHLORIDE 10 MG/1
10 TABLET ORAL DAILY
Qty: 30 TABLET | Refills: 0 | Status: SHIPPED | OUTPATIENT
Start: 2023-11-15 | End: 2023-11-17

## 2023-11-15 RX ORDER — POLYETHYLENE GLYCOL 3350 17 G/17G
17 POWDER, FOR SOLUTION ORAL 2 TIMES DAILY
Qty: 30 EACH | Refills: 0 | Status: SHIPPED | OUTPATIENT
Start: 2023-11-15 | End: 2023-11-20

## 2023-11-15 RX ORDER — ONDANSETRON 4 MG/1
4 TABLET, ORALLY DISINTEGRATING ORAL EVERY 6 HOURS PRN
Qty: 21 TABLET | Refills: 0 | Status: SHIPPED | OUTPATIENT
Start: 2023-11-15 | End: 2024-01-08

## 2023-11-15 RX ORDER — WARFARIN SODIUM 2.5 MG/1
2.5 TABLET ORAL
Status: DISCONTINUED | OUTPATIENT
Start: 2023-11-15 | End: 2023-11-15 | Stop reason: HOSPADM

## 2023-11-15 RX ADMIN — HYDROCORTISONE: 25 CREAM TOPICAL at 08:47

## 2023-11-15 RX ADMIN — MICONAZOLE NITRATE: 20 POWDER TOPICAL at 08:47

## 2023-11-15 RX ADMIN — METHADONE HYDROCHLORIDE 5 MG: 5 TABLET ORAL at 08:45

## 2023-11-15 RX ADMIN — MULTIPLE VITAMINS W/ MINERALS TAB 1 TABLET: TAB at 08:44

## 2023-11-15 RX ADMIN — Medication: at 08:47

## 2023-11-15 RX ADMIN — TORSEMIDE 15 MG: 10 TABLET ORAL at 08:44

## 2023-11-15 RX ADMIN — ASPIRIN 81 MG: 81 TABLET, COATED ORAL at 08:44

## 2023-11-15 RX ADMIN — POLYETHYLENE GLYCOL 3350 17 G: 17 POWDER, FOR SOLUTION ORAL at 08:44

## 2023-11-15 RX ADMIN — INSULIN ASPART 1 UNITS: 100 INJECTION, SOLUTION INTRAVENOUS; SUBCUTANEOUS at 08:45

## 2023-11-15 ASSESSMENT — ACTIVITIES OF DAILY LIVING (ADL)
ADLS_ACUITY_SCORE: 45

## 2023-11-15 NOTE — PLAN OF CARE
Problem: Pain Acute  Goal: Optimal Pain Control and Function  Outcome: Progressing  Intervention: Prevent or Manage Pain  Recent Flowsheet Documentation  Taken 11/15/2023 0100 by Lizbeth Del Rosario RN  Sensory Stimulation Regulation:   care clustered   lighting decreased   quiet environment promoted  Bowel Elimination Promotion: adequate fluid intake promoted  Medication Review/Management: medications reviewed  Intervention: Optimize Psychosocial Wellbeing  Recent Flowsheet Documentation  Taken 11/15/2023 0100 by Lizbeth Del Rosario RN  Supportive Measures: active listening utilized     Problem: Sleep Disturbance  Goal: Adequate Sleep/Rest  Outcome: Progressing   Goal Outcome Evaluation:       Pt has been coop with cares, denied pain / discomforts  V/signs stable, will continue to monitor.

## 2023-11-15 NOTE — PROGRESS NOTES
Care Management Discharge Note    Discharge Date: 11/15/2023       Discharge Disposition: Transitional Care. [facility]    Discharge Services: Transportation Services    Discharge DME: Wheelchair    Discharge Transportation:      Private pay costs discussed: Not applicable    Does the patient's insurance plan have a 3 day qualifying hospital stay waiver?  No    PAS Confirmation Code: [id number]  Patient/family educated on Medicare website which has current facility and service quality ratings:      Education Provided on the Discharge Plan: Yes  Persons Notified of Discharge Plans: patient, admission team  Patient/Family in Agreement with the Plan:   Yes    Handoff Referral Completed: Yes, [facility]  Nurse to nurse hand-off to be completed by Gila May RN. (Call to     Additional Information:      Steffanie Barrera RN, BSN  Care Coordination  [facility]  [phone]

## 2023-11-15 NOTE — DISCHARGE SUMMARY
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)  S/p Tibial/Fibular Resection and Debridement of Stump (10/17)  Chronic Venous Stasis  - Completed course of meropenem/vancomycin 10/8 for osteomyelitis  - Vascular surgery planning to close in the future, monthly clinic visits for wound checks with closure at unspecified time in the future  - Dilaudid 4mg PO and 0.3mg IV prn pain control (PO 30min prior and IV 15min prior to dressing changes)  - WOC consulted and following  - PT/OT    Constipation   -improving  - good BM on 11/9 and 11/10   - manual disimpaction (10/25)  - dulcolax 10mg x1, later 20mg x1 (10/25)  - fleet enema (10/25)  - doc-senna 2Q BID (with additional 2Q BID prn)  - miralax BID (10/26)  - MoM TID prn  - Viscous lidocaine for anal pain    Acute on Chronic Pain Syndrome  - methadone 5mg TID and 10mg @ 1500  - Dilaudid PO prn pain and with dressing changes  - previous Psych eval PTA, no associated depression    IDDM  - Tresiba 12u qHS,   - ISS  - Monitor  blood glucose with Accu-Cheks and adjust insulin accordingly    Afib  HTN  HLD  -Rate controlled at this time.  -Warfarin, pharmacy to dose  -Continue with statin  -Holding PTA lisinopril 5mg per pt wishes, can restart after discharge    CKD3.  Stable at this time  - baseline Cr 1-1.2  - holding lisinopril  -Monitor with BMP    FTT  - nutrition consult  - SW following    BERLIN  - refusing CPAP    Skin lesion, right lower extremity.  stable    -Improving  -Bactroban cream, apply twice daily to affected areas.    Dermatitis on hands, resolved. most likely allergic reaction.    -s/p hydrocortisone cream 2.5% topically twice daily    Consultations This Hospital Stay   THERAPEUTIC RECREATION EVAL & TREAT  OCCUPATIONAL THERAPY ADULT IP CONSULT  PHYSICAL THERAPY ADULT IP CONSULT  NUTRITION SERVICES ADULT IP CONSULT  WOUND OSTOMY CONTINENCE NURSE  IP CONSULT  PHARMACY TO DOSE WARFARIN  CARE MANAGEMENT / SOCIAL WORK IP CONSULT  PHYSICAL THERAPY ADULT IP CONSULT  OCCUPATIONAL THERAPY ADULT IP CONSULT    Code Status   Full Code    Time Spent on this Encounter   IClaude MD, personally saw the patient today and spent greater than 30 minutes discharging this patient.       Claude Rowe MD  [facility] [address] [place] [address]  Phone: [phone]  Fax: [phone]  ______________________________________________________________________    Physical Exam   Vital Signs: Temp: 98.8  F (37.1  C) Temp src: Oral BP: (!) 149/80 Pulse: 72   Resp: 20 SpO2: 97 % O2 Device: None (Room air)    Weight: 230 lbs 3.2 oz  Gen: She is a morbidly obese female lying in bed comfortably no distress  HEET: Head is normocephalic atraumatic eyes pupils appear equal round and reactive to light  Heart: She has a good S1 and S2 no obvious murmurs, no JVD   Lungs: She has a normal respiratory effort on auscultation good air entry is noted.   Abdomen: Soft nontender nondistended bowel sounds are  noted  Musculoskeletal: She has good muscle tone, below the left knee amputation noted  Skin: Crusted skin lesion on right leg, previous surrounding areas of erythema have resolved.   Please refer to nursing/wound care note for complete skin exam.       Primary Care Physician   Ish Brown    Discharge Orders      General info for SNF    Length of Stay Estimate: Short Term Care: Estimated # of Days <30  Condition at Discharge: Stable  Level of care:skilled   Rehabilitation Potential: Good  Admission H&P remains valid and up-to-date: No (If no, please update H&P)  Recent Chemotherapy: N/A  Use Nursing Home Standing Orders: Yes     Mantoux instructions    Give two-step Mantoux (PPD) Per Facility Policy Yes     Follow Up and recommended labs and tests    Follow up with long-term physician.  The following labs/tests are recommended: cbc and cmp.     Reason for your hospital stay    Please see discharge summary     Glucose monitor nursing POCT    Before meals and at bedtime     Intake and output    Every shift     Daily weights    Call Provider for weight gain of more than 2 pounds per day or 5 pounds per week.     Wound care    Site:   Left BKA stump  Instructions:  per [facility] NURSE     Activity - Up with nursing assistance     Full Code     Physical Therapy Adult Consult    Evaluate and treat as clinically indicated.    Reason:  strength and conditioning     Occupational Therapy Adult Consult    Evaluate and treat as clinically indicated.    Reason:  cognition and balance     Fall precautions     Diet    Follow this diet upon discharge: Orders Placed This Encounter      Snacks/Supplements Adult: Glucerna; Between Meals      Snacks/Supplements Adult: Expedite Bottle; With Meals      Consistent Carbohydrate Diet Moderate Consistent Carb (60 g CHO per Meal) Diet       Significant Results and Procedures   Most Recent 3 CBC's:  Recent Labs   Lab Test 11/01/23  0613 10/21/23  0637 10/17/23  0602   WBC 6.4 7.3  7.3   HGB 9.0* 8.3* 7.7*   MCV 96 98 98    234 173     Most Recent 3 BMP's:  Recent Labs   Lab Test 11/15/23  0800 11/14/23  2046 11/14/23  1703 11/01/23  0814 11/01/23  0613 10/21/23  0805 10/21/23  0637 10/20/23  0820 10/20/23  0544   NA  --   --   --   --  140  --  142  --  141   POTASSIUM  --   --   --   --  4.4  --  4.2  --  3.6   CHLORIDE  --   --   --   --  100  --  102  --  101   CO2  --   --   --   --  29  --  32*  --  31*   BUN  --   --   --   --  18.0  --  20.3  --  19.0   CR  --   --   --   --  0.96*  --  1.02*  --  1.04*   ANIONGAP  --   --   --   --  11  --  8  --  9   ZAKIA  --   --   --   --  9.8  --  9.9  --  9.9   * 224* 142*   < > 149*   < > 158*   < > 155*    < > = values in this interval not displayed.       Discharge Medications   Current Discharge Medication List        START taking these medications    Details   bisacodyl (DULCOLAX) 10 MG suppository Place 1 suppository (10 mg) rectally daily as needed for constipation  Qty: 30 suppository, Refills: 0    Associated Diagnoses: Drug-induced constipation      hydrocortisone 2.5 % cream Apply topically 2 times daily  Qty: 30 g, Refills: 0    Associated Diagnoses: Dermatitis      !! insulin aspart (NOVOLOG PEN) 100 UNIT/ML pen Inject 1-7 Units Subcutaneous 3 times daily (before meals)  Qty: 15 mL, Refills: 0    Associated Diagnoses: Type 2 diabetes mellitus with complication, with long-term current use of insulin (H)      !! insulin aspart (NOVOLOG PEN) 100 UNIT/ML pen Inject 1-5 Units Subcutaneous at bedtime  Qty: 15 mL, Refills: 0    Associated Diagnoses: Type 2 diabetes mellitus with complication, with long-term current use of insulin (H)      ondansetron (ZOFRAN ODT) 4 MG ODT tab Take 1 tablet (4 mg) by mouth every 6 hours as needed for nausea or vomiting  Qty: 21 tablet, Refills: 0    Associated Diagnoses: Nausea      polyethylene glycol (MIRALAX) 17 g packet Take 17 g by mouth 2 times daily  Qty: 30 each, Refills: 0    Associated  Diagnoses: Drug-induced constipation       !! - Potential duplicate medications found. Please discuss with provider.        CONTINUE these medications which have CHANGED    Details   HYDROmorphone (DILAUDID) 2 MG tablet Take 1 tablet (2 mg) by mouth every 4 hours as needed for severe pain  Qty: 21 tablet, Refills: 0    Associated Diagnoses: Chronic pain syndrome      !! methadone (DOLOPHINE) 10 MG tablet Take 1 tablet (10 mg) by mouth daily  Qty: 30 tablet, Refills: 0    Associated Diagnoses: Opioid dependence, uncomplicated (H)      !! methadone (DOLOPHINE) 5 MG tablet Take 1 tablet (5 mg) by mouth 3 times daily  Qty: 30 tablet, Refills: 0    Associated Diagnoses: Opioid dependence, uncomplicated (H)       !! - Potential duplicate medications found. Please discuss with provider.        CONTINUE these medications which have NOT CHANGED    Details   acetaminophen (TYLENOL) 325 MG tablet Take 2 tablets (650 mg) by mouth every 6 hours as needed for mild pain or other (and adjunct with moderate or severe pain or per patient request)    Associated Diagnoses: Pressure injury of skin, unspecified injury stage, unspecified location      aspirin 81 MG EC tablet Take 1 tablet (81 mg) by mouth daily    Associated Diagnoses: Hx of BKA, left (H)      atorvastatin (LIPITOR) 40 MG tablet Take 1 tablet (40 mg) by mouth every evening    Associated Diagnoses: Hx of BKA, left (H)      insulin degludec (TRESIBA) 200 UNIT/ML pen Inject 12 Units Subcutaneous at bedtime Around 11-11:30 pm    Associated Diagnoses: Type 2 diabetes mellitus with complication, with long-term current use of insulin (H)      miconazole (MICATIN) 2 % external powder Apply topically 2 times daily    Associated Diagnoses: Hx of BKA, left (H)      multivitamin w/minerals (THERA-VIT-M) tablet Take 1 tablet by mouth daily    Associated Diagnoses: Hx of BKA, left (H)      ONE TOUCH TEST STRIPS TEST   VI use as directed  Qty: 100, Refills: prn    Associated Diagnoses:  Type II or unspecified type diabetes mellitus without mention of complication, not stated as uncontrolled      senna-docusate (SENOKOT-S/PERICOLACE) 8.6-50 MG tablet Take 2 tablets by mouth 2 times daily    Associated Diagnoses: Hx of BKA, left (H)      torsemide (DEMADEX) 5 MG tablet Take 3 tablets (15 mg) by mouth daily    Associated Diagnoses: Essential hypertension with goal blood pressure less than 140/90      vitamin C (ASCORBIC ACID) 500 MG tablet Take 500 mg by mouth daily      warfarin ANTICOAGULANT (COUMADIN) 2.5 MG tablet 21 - 3.75 mg  22 - 3.75 mg  23 - 2.5 mg  24 - 3.75 mg  25 - 2.5 mg  26 - 3.75 mg  27 - 2.5 mg  28 - 3.75 mg           STOP taking these medications       metFORMIN (GLUCOPHAGE XR) 500 MG 24 hr tablet Comments:   Reason for Stopping:         naloxone (NARCAN) 4 MG/0.1ML nasal spray Comments:   Reason for Stopping:         OZEMPIC, 0.25 OR 0.5 MG/DOSE, 2 MG/3ML pen Comments:   Reason for Stopping:             Allergies   Allergies   Allergen Reactions    Prednisone Nausea and Vomiting    Morphine Nausea and Vomiting    Morphine [Fumaric Acid] Nausea and Vomiting

## 2023-11-15 NOTE — PLAN OF CARE
Patient alert and cooperative. Medication and care compliant. Ate well at breakfast; good fluid intake. Wound care to left BKA completed per orders. Pt discharged at approx 1215 to [facility] T[facility] room 5152 with belongings. Report given to receiving RN and pt transported to 5th floor with staff. BP (!) 149/80 (BP Location: Left arm)   Pulse 72   Temp 98.8  F (37.1  C) (Oral)   Resp 20   Wt 104.4 kg (230 lb 3.2 oz)   SpO2 97%   BMI 37.16 kg/m     Gila Robins RN

## 2023-11-15 NOTE — PROGRESS NOTES
[facility] GERIATRICS    PRIMARY CARE PROVIDER AND CLINIC:  Ish Brown MD, [facility] MEDICAL [facility] [address] / [facility] [id number]  Chief Complaint   Patient presents with    Hospital /U      [place] Medical Record Number:  [id number]  Place of Service where encounter took place:  [facility] (TCU)(SNF) [[id number]]    Linda Loredo  is a 78 year old  (1945), admitted to the above facility from  [facility] . Hospital stay 10/24/23 through 11/15/23..   HPI:    Linda Loredo is a 78 year old female admitted on 9/29/2023. She was transferred from [facility] for L heel osteomyelitis.  She underwent BKA on 10/7/23 and further debridement on 10/14/23. Long extensive rehab at [place] until 11/15/23. Admitted to [facility] TCU for ongoing therapy and nursing needs at this time.     The primary encounter diagnosis was Physical deconditioning. Diagnoses of Generalized muscle weakness, Diabetic ulcer of left heel associated with type 2 diabetes mellitus, with muscle involvement without evidence of necrosis (H), 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), Status post debridement, Osteomyelitis of left foot, unspecified type (H), Opioid dependence, uncomplicated (H), Chronic pain syndrome, Venous stasis ulcers of both lower extremities (H), Lymphedema, Type 2 diabetes mellitus with foot ulcer, with long-term current use of insulin (H), Obesity, Class III, BMI 40-49.9 (morbid obesity) (H), Slow transit constipation, Paroxysmal atrial fibrillation (H), Long term (current) use of anticoagulants, Warfarin-induced coagulopathy , Essential hypertension with goal blood pressure less than 140/90, Mixed hyperlipidemia, Stage 3 chronic kidney disease, unspecified whether stage 3a or 3b CKD (H), Chronic right-sided heart failure (H), Failure to thrive in adult, Anxiety, Major depressive disorder, recurrent episode,  moderate (H), Chronic obstructive pulmonary disease, unspecified COPD type (H), BERLIN (obstructive sleep apnea), Dermatitis, Skin lesion, Primary hyperparathyroidism (H24), and PICC (peripherally inserted central catheter) flush were also pertinent to this visit.    Met with patient who was found sitting upright in wheelchair today. She denies any chest pain, palpitations, shortness of breath, RASHID, lightheadedness, dizziness, or cough. Denies any abdominal discomfort. Denies N&V. Denies B&B concerns. Denies dysuria or frequency. Denies loose or constipation. Appetite good. Sleeping well. Reports pain stable at this time with current regimen. Reports not needing PRN dilaudid for over a week and wants this discontinued. PICC line to RUE in place trip-le lumen which all lumens there is no blood return. Staff to contact PICC team to come out to assess for blood return. Will start flush orders and dressing changes per need at this time. Open to recommendations below. Mood stable. No recent falls.     BP Readings from Last 3 Encounters:   11/17/23 128/85   11/15/23 (!) 149/80   10/24/23 130/70     Wt Readings from Last 5 Encounters:   11/17/23 104.3 kg (230 lb)   11/12/23 104.4 kg (230 lb 3.2 oz)   10/24/23 111.2 kg (245 lb 2.4 oz)   09/26/23 120.2 kg (265 lb)   06/22/23 111.4 kg (245 lb 8 oz)     CODE STATUS/ADVANCE DIRECTIVES DISCUSSION:  Full Code  CPR/Full code   ALLERGIES:   Allergies   Allergen Reactions    Prednisone Nausea and Vomiting    Morphine Nausea and Vomiting    Morphine [Fumaric Acid] Nausea and Vomiting      PAST MEDICAL HISTORY:   Past Medical History:   Diagnosis Date    Depressive disorder, not elsewhere classified     Herpes zoster without mention of complication     Hypercalcemia 2/24/2007    Mild intermittent asthma     Other urinary incontinence     Type II or unspecified type diabetes mellitus with renal manifestations, uncontrolled(250.42) (H)     Type II or unspecified type diabetes mellitus  without mention of complication, not stated as uncontrolled     Unspecified essential hypertension       PAST SURGICAL HISTORY:   has a past surgical history that includes Cholecystectomy; ligation of fallopian tube; Open reduction internal fixation ankle (10/13/11); pinning of left 2nd toe; Incision and drainage foot, combined (Left, 9/26/2023); IR Lower Extremity Angiogram Left (10/3/2023); Amputate leg below knee (Left, 10/7/2023); and Amputate leg below knee (Left, 10/14/2023).  FAMILY HISTORY: family history includes Cancer in her maternal grandmother and paternal grandmother; Diabetes in her sister; Heart Disease in her father; Hypertension in her mother and sister; Psychotic Disorder in her sister; Respiratory in her sister.  SOCIAL HISTORY:   reports that she has never smoked. She has never used smokeless tobacco. She reports that she does not drink alcohol and does not use drugs.  Patient's living condition: lives alone    Post Discharge Medication Reconciliation Status:   MED REC REQUIRED  Post Medication Reconciliation Status:  Discharge medications reconciled and changed, see notes/orders       Current Outpatient Medications   Medication Sig    methadone (DOLOPHINE) 10 MG tablet Take 1 tablet (10 mg) by mouth daily Daily at 1500    methadone (DOLOPHINE) 5 MG tablet Take 1 tablet (5 mg) by mouth 3 times daily Give 0800/1200 and 2100    mupirocin (BACTROBAN) 2 % external ointment Apply topically 2 times daily Apply to RLE lesion BID    sodium chloride, PF, (SODIUM CHLORIDE FLUSH) 0.9% PF flush Inject 10 mLs into the vein every 12 hours Flush each PICC lumen with 10mL 0.9% sodium chloride daily and PRN before and after use.    torsemide (DEMADEX) 5 MG tablet Take 3 tablets (15 mg) by mouth daily HOLD if SBP<100    Warfarin Therapy Reminder Per INR    acetaminophen (TYLENOL) 325 MG tablet Take 2 tablets (650 mg) by mouth every 6 hours as needed for mild pain or other (and adjunct with moderate or severe pain  "or per patient request)    aspirin 81 MG EC tablet Take 1 tablet (81 mg) by mouth daily    atorvastatin (LIPITOR) 40 MG tablet Take 1 tablet (40 mg) by mouth every evening    bisacodyl (DULCOLAX) 10 MG suppository Place 1 suppository (10 mg) rectally daily as needed for constipation    hydrocortisone 2.5 % cream Apply topically 2 times daily    insulin aspart (NOVOLOG PEN) 100 UNIT/ML pen Inject 1-7 Units Subcutaneous 3 times daily (before meals)    insulin aspart (NOVOLOG PEN) 100 UNIT/ML pen Inject 1-5 Units Subcutaneous at bedtime    insulin degludec (TRESIBA) 200 UNIT/ML pen Inject 12 Units Subcutaneous at bedtime Around 11-11:30 pm    miconazole (MICATIN) 2 % external powder Apply topically 2 times daily    multivitamin w/minerals (THERA-VIT-M) tablet Take 1 tablet by mouth daily    ondansetron (ZOFRAN ODT) 4 MG ODT tab Take 1 tablet (4 mg) by mouth every 6 hours as needed for nausea or vomiting    polyethylene glycol (MIRALAX) 17 g packet Take 17 g by mouth 2 times daily    senna-docusate (SENOKOT-S/PERICOLACE) 8.6-50 MG tablet Take 4 tablets by mouth 2 times daily for 30 days    vitamin C (ASCORBIC ACID) 500 MG tablet Take 500 mg by mouth daily     No current facility-administered medications for this visit.       ROS:  10 point ROS of systems including Constitutional, Eyes, Respiratory, Cardiovascular, Gastroenterology, Genitourinary, Integumentary, Musculoskeletal, Psychiatric were all negative except for pertinent positives noted in my HPI.    Vitals:  /85   Pulse 87   Temp 98.5  F (36.9  C)   Resp 20   Ht 1.676 m (5' 6\")   Wt 104.3 kg (230 lb)   SpO2 98%   BMI 37.12 kg/m    Exam:  GENERAL APPEARANCE:  Alert, in no distress, oriented, morbidly obese, cooperative  ENT:  Mouth and posterior oropharynx normal, moist mucous membranes, normal hearing acuity  EYES:  EOM, conjunctivae, lids, pupils and irises normal  RESP:  respiratory effort and palpation of chest normal, lungs clear to auscultation " , no respiratory distress  CV:  Palpation and auscultation of heart done , regular rate and rhythm, no murmur, rub, or gallop  ABDOMEN:  normal bowel sounds, soft, nontender, no hepatosplenomegaly or other masses, no guarding or rebound  M/S:   Slide board transfers. Wheelchair bound. Staff to assist for all ADLs, transfers and cares  SKIN:  Inspection of skin and subcutaneous tissue baseline, wound healing well, no signs of infection did not observe wound to left stump today as dressing is C/D/i  NEURO:   Cranial nerves 2-12 are normal tested and grossly at patient's baseline, no purposeful movement in upper and lower extremities  PSYCH:  oriented X 3, normal insight, judgement and memory, affect and mood normal    Lab/Diagnostic data:  Most Recent 3 CBC's:  Recent Labs   Lab Test 11/17/23 1033 11/01/23  0613 10/21/23  0637   WBC 7.8 6.4 7.3   HGB 10.5* 9.0* 8.3*   MCV 95 96 98    268 234     Most Recent 3 BMP's:  Recent Labs   Lab Test 11/17/23 1033 11/15/23  0800 11/14/23  2046 11/01/23  0814 11/01/23  0613 10/21/23  0805 10/21/23  0637     --   --   --  140  --  142   POTASSIUM 4.3  --   --   --  4.4  --  4.2   CHLORIDE 101  --   --   --  100  --  102   CO2 29  --   --   --  29  --  32*   BUN 27.9*  --   --   --  18.0  --  20.3   CR 0.93  --   --   --  0.96*  --  1.02*   ANIONGAP 11  --   --   --  11  --  8   ZAKIA 10.9*  --   --   --  9.8  --  9.9   * 141* 224*   < > 149*   < > 158*    < > = values in this interval not displayed.     Most Recent 2 LFT's:  Recent Labs   Lab Test 11/17/23 1033 11/01/23  0613   AST 20 19   ALT 11 12   ALKPHOS 90 97   BILITOTAL 0.5 0.2     Most Recent 3 INR's:  Recent Labs   Lab Test 11/17/23 1033 11/15/23  0644 11/14/23  0606   INR 1.69* 2.45* 2.26*     Most Recent INR's and Anticoagulation Dosing History:  Anticoagulation Dose History  More data exists         Latest Ref Rng & Units 11/9/2023 11/10/2023 11/11/2023 11/12/2023 11/13/2023 11/14/2023 11/15/2023    Recent Dosing and Labs   warfarin ANTICOAGULANT (COUMADIN) tablet 2 mg - 2 mg, $Given 2 mg, $Given - - - 2 mg, $Given -   warfarin ANTICOAGULANT (COUMADIN) tablet 3 mg - - - 3 mg, $Given - - - -   warfarin ANTICOAGULANT (COUMADIN) tablet 4 mg - - - - 4 mg, $Given 4 mg, $Given - -   INR 0.85 - 1.15 2.01  1.84  1.80  1.79  1.93  2.26  2.45      Most Recent Cholesterol Panel:  Recent Labs   Lab Test 09/30/23  1620   CHOL 82   LDL 37   HDL 35*   TRIG 49     Most Recent Hemoglobin A1c:  Recent Labs   Lab Test 09/23/23  0533   A1C 7.0*     Most Recent Urinalysis:  Recent Labs   Lab Test 09/23/23  1302   COLOR Yellow   APPEARANCE Cloudy*   URINEGLC Negative   URINEBILI Negative   URINEKETONE Negative   SG 1.015   UBLD Large*   URINEPH 5.0   PROTEIN 30*   NITRITE Negative   LEUKEST Large*   RBCU 146*   WBCU >182*     Most Recent Anemia Panel:  Recent Labs   Lab Test 11/17/23  1033   WBC 7.8   HGB 10.5*   HCT 34.4*   MCV 95          ASSESSMENT/PLAN:    (R53.81) Physical deconditioning  (primary encounter diagnosis)  (M62.81) Generalized muscle weakness  Comment: Acute on chronic. S/T below diagnosis  Plan:   -Continue Physical therapy and Occupational therapy as directed  -SW to remain involved for safe discharge planning needs    (E11.621,  L97.425) Diabetic ulcer of left heel associated with type 2 diabetes mellitus, with muscle involvement without evidence of necrosis (H)  (Z89.512) 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)  (Z98.890) Status post debridement  (M86.9) Osteomyelitis of left foot, unspecified type (H)  (F11.20) Opioid dependence, uncomplicated (H)  (G89.4) Chronic pain syndrome  (I83.019,  I83.029,  L97.919,  L97.929) Venous stasis ulcers of both lower extremities (H)  (I89.0) Lymphedema  Comment: Chronic. S/p debridement 9/26/23. S/p Left guillotine BKA on 10/7/23. S/p tibial/fibular resection and debridement of stump 10/17/23. Followed by vascular. Completed course of  meropenem/vancomycin 10/8 for osteomyelitis. Vascular surgery planning to close in the future, monthly clinic visits for wound checks with closure at unspecified time in the future  Plan:   -Monitor pain complaints  -In house wound provider to follow for ongoing needs  -Discontinue PRN dilaudid due to non use.   -Continue methadone 10mg daily at 1500 and 5mg TID scheduled.   -Tylenol PRN  -Continue triple lumen PICC to RUE as directed. Added daily flush and PRN along with dressing weekly changes. Staff to contact PICC team today to come out to assess PICC and to assist with PICC de-clogging needs  -CMP, CBC, and INR due today 11/17/23  -BMP, CBC, INR, TSH, and free T4 due 11/24/23  -Follow up with vascular as directed. Scheduled for 12/11/23 and 1/15/24  -Continue current wound cares as directed;  *Wound Care - Left BKA : *Change daily* 1. Cleanse w/Vashe soaked gauze, including sreekanth wound skin, gentle pat dry. 2. Apply skin prep to sreekanth wound skin. 3. Soak Hydrofera Blue with saline and place over wound bed, cover with abd, and wrap with kerlix to secure.    (E11.621,  L97.509,  Z79.4) Type 2 diabetes mellitus with foot ulcer, with long-term current use of insulin (H)  (E66.01) Obesity, Class III, BMI 40-49.9 (morbid obesity) (H)  Comment: Chronic. Last A1c 7% in sept 2023. Goal <9%.   Plan:   -Monitor Blood Glucose QID as directed  -Continue sliding scale insulin as directed TID with meals and HS  -Continue tresiba 12 units at HS  -Recommend hgb A1c due Dec 2023.   -CMP, CBC, and INR due today 11/17/23  -BMP, CBC, INR, TSH, and free T4 due 11/24/23    (K59.01) Slow transit constipation  Comment: Acute on chronic. Improving with regimen below.   Plan:   -Monitor BM patterns  -Miralax BID  -Senna S 4 tabs BID  -Zofran PRN  -Bisacodyl daily PRN  -CMP, CBC, and INR due today 11/17/23  -BMP, CBC, INR, TSH, and free T4 due 11/24/23    (I48.0) Paroxysmal atrial fibrillation (H)  (Z79.01) Long term (current) use of  anticoagulants  (D68.32,  T45.515A) Warfarin-induced coagulopathy   (I10) Essential hypertension with goal blood pressure less than 140/90  (E78.2) Mixed hyperlipidemia  (N18.30) Stage 3 chronic kidney disease, unspecified whether stage 3a or 3b CKD (H)  (I50.812) Chronic right-sided heart failure (H)  Comment: Chronic. Baseline Creatinine~ 1-1.2. Based on JNC-8 goals,  patients age of 78 year old, presence of diabetes or CKD, and goals of care goal BP is  <140/90 mm Hg. Patient is stable with current plan of care and routine assessment..Holding lisinopril per chart review per patient wishes. INR goal 2-3. Last INR 2.45 on 11/15/23. INR today 11/17/23-1.69  Plan:   -Monitor for worsening s/symptoms of concerns  -Monitor BP and HR as directed  -Continue asa 81mg daily  -Continue atorvastatin 40mg daily  -Change coumadin to 2.5mg on mon/thurs and 3mg AOD  -Continue Torsemide 15mg daily. HOLD if SBP<100  -CMP, CBC, and INR due today 11/17/23  -BMP, CBC, INR, TSH, and free T4 due 11/24/23    (R62.7) Failure to thrive in adult  (F41.9) Anxiety  (F33.1) Major depressive disorder, recurrent episode, moderate (H)  Comment: Chronic. S/T current health condition  Plan:   -Monitor mood and behaviors  -Monitor for worsening s/symptoms of concerns  -Dietician to remain involved  -ACP offered while on TCU  -Monitor for changes in mobility, eating and sleeping patterns  -CMP, CBC, and INR due today 11/17/23  -BMP, CBC, INR, TSH, and free T4 due 11/24/23    (J44.9) Chronic obstructive pulmonary disease, unspecified COPD type (H)  (G47.33) BERLIN (obstructive sleep apnea)  Comment: Chronic. Non compliant with CPAP use.   Plan:   -Monitor respiratory status  -Would recommend CPAP restart if compliant  -CMP, CBC, and INR due today 11/17/23  -BMP, CBC, INR, TSH, and free T4 due 11/24/23    (L30.9) Dermatitis  Comment: Acute on chronic. History of allergic reaction to bilateral hands per chart review.   Plan:   -Monitor for worsening  s/symptoms of concerns  -Hydrocortisone 2.5% topically BID  -CMP, CBC, and INR due today 11/17/23  -BMP, CBC, INR, TSH, and free T4 due 11/24/23    (L98.9) Skin lesion  Comment: Acute on chronic. Noted to RLE per chart review.   Plan:   -In house provider to continue to follow  -Monitor for worsening s/symptoms of concerns  -Bactroban cream to areas BID per orders  -CMP, CBC, and INR due today 11/17/23  -BMP, CBC, INR, TSH, and free T4 due 11/24/23    (E21.0) Primary hyperparathyroidism (H24)  Comment: Acute. Noted on chart review. Last TSH level in 09/2022 was 1.73.   Plan:   -Not currently on any supplementation.   -Monitor for worsening s/symptoms of concerns  --CMP, CBC, and INR due today 11/17/23  -BMP, CBC, INR, TSH, and free T4 due 11/24/23      Electronically signed by:  Dr. Haylie Rowe DNP, APRN, FNP-C, WCS-C, EDS-C

## 2023-11-15 NOTE — PROGRESS NOTES
[facility]  WO Nurse Inpatient Assessment     Consulted for: Left BKA    Summary: Vascular requesting photo of sreekanth wound skin:      Previous assessment below:    Patient History (according to provider note(s):      Per H+P:   78y F PMHx IDDM, chronic venous stasis, chronic pain syndrome, afib, HTN, HLD, CKD3, BERLIN, and FTT who presents to [facility] for ongoing wound care and therapies. Pt initially presented 9/23 to [facility] after welfare check by police. She was found to have L leg swelling and edema and was diagnosed with polymicrobial osteomyelitis. She is s/p debridement by podiatry on 9/26. Bcx negative at the time. ALMA with poor flow and pt was transferred to [facility] for vascular surgery evaluation. Pt managed with meropenem and vancomycin at the time for osteo. 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 Vascular surgery was unable to close the stump at the time and elected for ongoing healing prior to surgical closure thus she was discharged to [facility] for ongoing wound care and therapy.      Assessment:      Areas visualized during today's visit: Lower extremities  and buttocks    Wound location: Left buttock fold and skin folds  Wound due to: Friction and Incontinence Associated Dermatitis (IAD)  Wound history/plan of care: Patient with history of wounds and IAD  Healed       Wound location: Left BKA   Last photo: 11/3     Previous facility      10/26      11/3      11/10    Wound due to: surgical  Wound history/plan of care: s/p BKA, guillotine 10/7  Wound base:  Granulation a     Palpation of the wound bed: soft, boggy     Drainage: moderate     Description of drainage: serosanguinous     Measurements (length x width x depth, in cm): 17  x 17cm, raised in center but edges flush     Tunneling: N/A     Undermining: N/A  Periwound skin: intact, edema decreasing      Color: red/pink        Temperature: normal   Odor: moderate  Pain: mild  to moderate during dressing change  Pain interventions prior to dressing change: oral pain meds, slow and gentle cares  Treatment goal: Drainage control, Infection control/prevention, Increase granulation, Protection, and Prepare wound bed for flap/graft  STATUS: improving  Supplies ordered: stored on unit      Wound location: Plantar right foot    Last photo: 10/26  Wound due to:  Diabetic versus pressure  Wound history/plan of care: Patient states she has had this a long time   Wound base: discoloration under callus     Palpation of the wound bed: normal      Drainage: none     Description of drainage: none     Measurements (length x width x depth, in cm): 4  x 4.5 cm      Tunneling: N/A     Undermining: N/A  Periwound skin: Intact      Color: normal and consistent with surrounding tissue      Temperature: normal   Odor: none  Pain: denies   Treatment goal: Maintain (prevention of deterioration)  STATUS: stable  Patient also has dry patch/callus/eschar on left palm of hand - will monitor      Treatment Plan:     Left BKA: PLEASE READ ALL STEPS PRIOR TO STARTING (and administer pain meds per orders):  1. Apply Vashe soaked gauze to skin, including sreekanth wound skin, let soak for 5 min  2. Rinse with saline and pat dry (MUST DO THIS - Vashe is NOT compatible with Hydrofera  3. Apply skin prep to sreekanth wound skin  4. Cut Hydrofera Blue in half and soak with saline and place over wound bed - use both halves but do not place it a one piece to reduce pressure  5. cover with abd, and wrap with kerlix to secure  Change daily    Left buttock fold:  1. Cleanse with bath wipes, including sreekanth wound skin, gently pat dry  2. Apply Mepilex  Change every other day    Daily:  Interdry(order#[id number]):   1.  Wash skin gently. Pat, do not rub.  2.  With clean scissors, cut enough fabric to cover the affected area, allowing for a minimum of 2 inches to extend beyond the skin fold for moisture evaporation.  3.  Lay a single layer of  fabric in the skin fold, placing one edge into the base of the fold. Gently smooth the rest of the fabric over the skin, keeping it flat.  4.  Leave at least 2 inches of fabric exposed outside the skin fold.  5.  Secure the fabric in one of several ways: with the skin fold, with a small amount of skin-friendly tape, or tucked under clothing.  6.  Remove the fabric before bathing and reuse it when finished. When removing, gently separate the skin fold and lift away.  Helpful Hints  1. InterDry  can be written on with a ballpoint pen. It may be helpful to label each piece of InterDry  with the date you started using it.  2.  Each piece of InterDry  may be used up to 5 days, depending on fabric soiling, odor, amount of moisture and general skin condition. Replace InterDry  if it becomes soiled with blood, urine or stool.  3.  Do not use creams, ointments, or powders with InterDry  as it may interfere with product efficacy.      Orders: Reviewed    RECOMMEND PRIMARY TEAM ORDER: None, at this time  Education provided: plan of care, wound progress, Moisture management, Hygiene, and Off-loading pressure  Discussed plan of care with: Patient and Nurse   WOC nurse follow-up plan: weekly   Notify WOC if wound(s) deteriorate.  Nursing to notify the Provider(s) and re-consult the WOC Nurse if new skin concern.    DATA:     Current support surface: Standard  Low air loss (BRISA pump, Isolibrium, Pulsate, skin guard, etc)  Containment of urine/stool: Incontinence Protocol, Incontinent pad in bed, and Purewick external catheter   BMI: Body mass index is 37.16 kg/m .   Active diet order: Orders Placed This Encounter      Consistent Carbohydrate Diet Moderate Consistent Carb (60 g CHO per Meal) Diet     Output: I/O last 3 completed shifts:  In: 1080 [P.O.:1080]  Out: 1650 [Urine:1650]     Labs:   Recent Labs   Lab 11/14/23  0606   INR 2.26*     Pressure injury risk assessment:   Sensory Perception: 4-->no impairment  Moisture:  3-->occasionally moist  Activity: 2-->chairfast  Mobility: 3-->slightly limited  Nutrition: 3-->adequate  Friction and Shear: 2-->potential problem  Suresh Score: 17    JOHAN SethiN, RN, PHN, HNB-BC, CWOCN  Pager no longer is use, please contact through [employer] group: [facility] [email or web address]

## 2023-11-15 NOTE — PLAN OF CARE
Physical Therapy Discharge Summary    Reason for therapy discharge:    Discharged to transitional care facility.    Progress towards therapy goal(s). See goals on Care Plan in [facility] electronic health record for goal details.  Goals partially met.  Barriers to achieving goals:   discharge from facility.    Therapy recommendation(s):    Continued therapy is recommended.  Rationale/Recommendations:  Pt remains below baseline of IND with functional mobility. Pt currently is Mod I with bed mobility and is SBA with Slide board transfers. Pt would benefit from continued PT in order to improve overall strength to progress to low stand pivot transfers and IND with all transfers and wc mobility in order to return home.  .** Pt started process of getting a new light weight manual wc via [employer] (Naomi Licona, SIMONE). NSM will be delivering trial lightweight wc on 11/21.

## 2023-11-16 ENCOUNTER — LAB REQUISITION (OUTPATIENT)
Dept: LAB | Facility: CLINIC | Age: 78
End: 2023-11-16
Payer: COMMERCIAL

## 2023-11-16 ENCOUNTER — PATIENT OUTREACH (OUTPATIENT)
Dept: CARE COORDINATION | Facility: CLINIC | Age: 78
End: 2023-11-16
Payer: COMMERCIAL

## 2023-11-16 DIAGNOSIS — I48.91 UNSPECIFIED ATRIAL FIBRILLATION (H): ICD-10-CM

## 2023-11-16 NOTE — PROGRESS NOTES
[facility]    Background: Transitional Care Management program identified per system criteria and reviewed by [facility] team for possible outreach.    Assessment: Upon chart review, CCRC Team member will not proceed with patient outreach related to this episode of Transitional Care Management program due to reason below:    Non-[facility] TCU: CCRC team member noted patient discharged to TCU/ARU/LTACH. Patient is not established with a [facility] currently supported by Primary Care-Care Coordination therefore handoff to Primary Care-Care Coordination is not appropriate at this time.    Plan: Transitional Care Management episode addressed appropriately per reason noted above.      PAMELA Mendez  [facility] [place], [facility]    *Connected Care Resource Team does NOT follow patient ongoing. Referrals are identified based on internal discharge reports and the outreach is to ensure patient has an understanding of their discharge instructions.

## 2023-11-17 ENCOUNTER — TRANSITIONAL CARE UNIT VISIT (OUTPATIENT)
Dept: GERIATRICS | Facility: CLINIC | Age: 78
End: 2023-11-17
Payer: COMMERCIAL

## 2023-11-17 VITALS
HEIGHT: 66 IN | SYSTOLIC BLOOD PRESSURE: 128 MMHG | HEART RATE: 87 BPM | BODY MASS INDEX: 36.96 KG/M2 | DIASTOLIC BLOOD PRESSURE: 85 MMHG | RESPIRATION RATE: 20 BRPM | WEIGHT: 230 LBS | OXYGEN SATURATION: 98 % | TEMPERATURE: 98.5 F

## 2023-11-17 DIAGNOSIS — E21.0 PRIMARY HYPERPARATHYROIDISM (H): ICD-10-CM

## 2023-11-17 DIAGNOSIS — D68.32 WARFARIN-INDUCED COAGULOPATHY (H): ICD-10-CM

## 2023-11-17 DIAGNOSIS — E78.2 MIXED HYPERLIPIDEMIA: ICD-10-CM

## 2023-11-17 DIAGNOSIS — Z89.512 STATUS POST BELOW-KNEE AMPUTATION OF LEFT LOWER EXTREMITY (H): ICD-10-CM

## 2023-11-17 DIAGNOSIS — N18.30 STAGE 3 CHRONIC KIDNEY DISEASE, UNSPECIFIED WHETHER STAGE 3A OR 3B CKD (H): ICD-10-CM

## 2023-11-17 DIAGNOSIS — K59.01 SLOW TRANSIT CONSTIPATION: ICD-10-CM

## 2023-11-17 DIAGNOSIS — L30.9 DERMATITIS: ICD-10-CM

## 2023-11-17 DIAGNOSIS — I48.0 PAROXYSMAL ATRIAL FIBRILLATION (H): ICD-10-CM

## 2023-11-17 DIAGNOSIS — L97.425 DIABETIC ULCER OF LEFT HEEL ASSOCIATED WITH TYPE 2 DIABETES MELLITUS, WITH MUSCLE INVOLVEMENT WITHOUT EVIDENCE OF NECROSIS (H): ICD-10-CM

## 2023-11-17 DIAGNOSIS — I83.019 VENOUS STASIS ULCERS OF BOTH LOWER EXTREMITIES (H): ICD-10-CM

## 2023-11-17 DIAGNOSIS — Z98.890 STATUS POST DEBRIDEMENT: ICD-10-CM

## 2023-11-17 DIAGNOSIS — M62.81 GENERALIZED MUSCLE WEAKNESS: ICD-10-CM

## 2023-11-17 DIAGNOSIS — Z79.4 TYPE 2 DIABETES MELLITUS WITH FOOT ULCER, WITH LONG-TERM CURRENT USE OF INSULIN (H): ICD-10-CM

## 2023-11-17 DIAGNOSIS — F33.1 MAJOR DEPRESSIVE DISORDER, RECURRENT EPISODE, MODERATE (H): ICD-10-CM

## 2023-11-17 DIAGNOSIS — L97.509 TYPE 2 DIABETES MELLITUS WITH FOOT ULCER, WITH LONG-TERM CURRENT USE OF INSULIN (H): ICD-10-CM

## 2023-11-17 DIAGNOSIS — R53.81 PHYSICAL DECONDITIONING: Primary | ICD-10-CM

## 2023-11-17 DIAGNOSIS — I89.0 LYMPHEDEMA: ICD-10-CM

## 2023-11-17 DIAGNOSIS — G89.4 CHRONIC PAIN SYNDROME: ICD-10-CM

## 2023-11-17 DIAGNOSIS — L97.919 VENOUS STASIS ULCERS OF BOTH LOWER EXTREMITIES (H): ICD-10-CM

## 2023-11-17 DIAGNOSIS — I83.029 VENOUS STASIS ULCERS OF BOTH LOWER EXTREMITIES (H): ICD-10-CM

## 2023-11-17 DIAGNOSIS — F11.20 OPIOID DEPENDENCE, UNCOMPLICATED (H): ICD-10-CM

## 2023-11-17 DIAGNOSIS — R62.7 FAILURE TO THRIVE IN ADULT: ICD-10-CM

## 2023-11-17 DIAGNOSIS — I50.812 CHRONIC RIGHT-SIDED HEART FAILURE (H): ICD-10-CM

## 2023-11-17 DIAGNOSIS — G47.33 OSA (OBSTRUCTIVE SLEEP APNEA): ICD-10-CM

## 2023-11-17 DIAGNOSIS — E11.621 TYPE 2 DIABETES MELLITUS WITH FOOT ULCER, WITH LONG-TERM CURRENT USE OF INSULIN (H): ICD-10-CM

## 2023-11-17 DIAGNOSIS — I10 ESSENTIAL HYPERTENSION WITH GOAL BLOOD PRESSURE LESS THAN 140/90: ICD-10-CM

## 2023-11-17 DIAGNOSIS — E11.621 DIABETIC ULCER OF LEFT HEEL ASSOCIATED WITH TYPE 2 DIABETES MELLITUS, WITH MUSCLE INVOLVEMENT WITHOUT EVIDENCE OF NECROSIS (H): ICD-10-CM

## 2023-11-17 DIAGNOSIS — E66.01 OBESITY, CLASS III, BMI 40-49.9 (MORBID OBESITY) (H): ICD-10-CM

## 2023-11-17 DIAGNOSIS — Z45.2 PICC (PERIPHERALLY INSERTED CENTRAL CATHETER) FLUSH: ICD-10-CM

## 2023-11-17 DIAGNOSIS — Z79.01 LONG TERM (CURRENT) USE OF ANTICOAGULANTS: ICD-10-CM

## 2023-11-17 DIAGNOSIS — M86.9 OSTEOMYELITIS OF LEFT FOOT, UNSPECIFIED TYPE (H): ICD-10-CM

## 2023-11-17 DIAGNOSIS — L98.9 SKIN LESION: ICD-10-CM

## 2023-11-17 DIAGNOSIS — L97.929 VENOUS STASIS ULCERS OF BOTH LOWER EXTREMITIES (H): ICD-10-CM

## 2023-11-17 DIAGNOSIS — J44.9 CHRONIC OBSTRUCTIVE PULMONARY DISEASE, UNSPECIFIED COPD TYPE (H): ICD-10-CM

## 2023-11-17 DIAGNOSIS — T45.515A WARFARIN-INDUCED COAGULOPATHY (H): ICD-10-CM

## 2023-11-17 DIAGNOSIS — F41.9 ANXIETY: ICD-10-CM

## 2023-11-17 LAB
ALBUMIN SERPL BCG-MCNC: 3.8 G/DL (ref 3.5–5.2)
ALP SERPL-CCNC: 90 U/L (ref 40–150)
ALT SERPL W P-5'-P-CCNC: 11 U/L (ref 0–50)
ANION GAP SERPL CALCULATED.3IONS-SCNC: 11 MMOL/L (ref 7–15)
AST SERPL W P-5'-P-CCNC: 20 U/L (ref 0–45)
BILIRUB SERPL-MCNC: 0.5 MG/DL
BUN SERPL-MCNC: 27.9 MG/DL (ref 8–23)
CALCIUM SERPL-MCNC: 10.9 MG/DL (ref 8.8–10.2)
CHLORIDE SERPL-SCNC: 101 MMOL/L (ref 98–107)
CREAT SERPL-MCNC: 0.93 MG/DL (ref 0.51–0.95)
DEPRECATED HCO3 PLAS-SCNC: 29 MMOL/L (ref 22–29)
EGFRCR SERPLBLD CKD-EPI 2021: 63 ML/MIN/1.73M2
ERYTHROCYTE [DISTWIDTH] IN BLOOD BY AUTOMATED COUNT: 14.6 % (ref 10–15)
GLUCOSE SERPL-MCNC: 205 MG/DL (ref 70–99)
HCT VFR BLD AUTO: 34.4 % (ref 35–47)
HGB BLD-MCNC: 10.5 G/DL (ref 11.7–15.7)
HOLD SPECIMEN: NORMAL
INR PPP: 1.69 (ref 0.85–1.15)
MCH RBC QN AUTO: 28.9 PG (ref 26.5–33)
MCHC RBC AUTO-ENTMCNC: 30.5 G/DL (ref 31.5–36.5)
MCV RBC AUTO: 95 FL (ref 78–100)
PLATELET # BLD AUTO: 218 10E3/UL (ref 150–450)
POTASSIUM SERPL-SCNC: 4.3 MMOL/L (ref 3.4–5.3)
PROT SERPL-MCNC: 7.9 G/DL (ref 6.4–8.3)
RBC # BLD AUTO: 3.63 10E6/UL (ref 3.8–5.2)
SODIUM SERPL-SCNC: 141 MMOL/L (ref 135–145)
WBC # BLD AUTO: 7.8 10E3/UL (ref 4–11)

## 2023-11-17 PROCEDURE — 99309 SBSQ NF CARE MODERATE MDM 30: CPT | Performed by: NURSE PRACTITIONER

## 2023-11-17 PROCEDURE — 80053 COMPREHEN METABOLIC PANEL: CPT | Performed by: NURSE PRACTITIONER

## 2023-11-17 PROCEDURE — 85027 COMPLETE CBC AUTOMATED: CPT | Performed by: NURSE PRACTITIONER

## 2023-11-17 PROCEDURE — 86481 TB AG RESPONSE T-CELL SUSP: CPT | Performed by: NURSE PRACTITIONER

## 2023-11-17 PROCEDURE — 36415 COLL VENOUS BLD VENIPUNCTURE: CPT | Performed by: NURSE PRACTITIONER

## 2023-11-17 PROCEDURE — 85610 PROTHROMBIN TIME: CPT | Performed by: NURSE PRACTITIONER

## 2023-11-17 RX ORDER — MUPIROCIN 20 MG/G
OINTMENT TOPICAL 2 TIMES DAILY
Qty: 60 G | Refills: 11 | Status: SHIPPED | OUTPATIENT
Start: 2023-11-17 | End: 2023-12-01

## 2023-11-17 RX ORDER — TORSEMIDE 5 MG/1
15 TABLET ORAL DAILY
Start: 2023-11-17 | End: 2023-12-08

## 2023-11-17 RX ORDER — METHADONE HYDROCHLORIDE 10 MG/1
10 TABLET ORAL DAILY
Qty: 30 TABLET | Refills: 0 | Status: SHIPPED | OUTPATIENT
Start: 2023-11-17 | End: 2023-11-27

## 2023-11-17 RX ORDER — MUPIROCIN 20 MG/G
OINTMENT TOPICAL DAILY
Qty: 60 G | Refills: 11 | Status: SHIPPED | OUTPATIENT
Start: 2023-11-17 | End: 2023-11-17

## 2023-11-17 RX ORDER — METHADONE HYDROCHLORIDE 5 MG/1
5 TABLET ORAL 3 TIMES DAILY
Qty: 90 TABLET | Refills: 0 | Status: SHIPPED | OUTPATIENT
Start: 2023-11-17 | End: 2023-11-26

## 2023-11-18 LAB
QUANTIFERON MITOGEN: 1.28 IU/ML
QUANTIFERON NIL TUBE: 0.01 IU/ML
QUANTIFERON TB1 TUBE: 0.01 IU/ML
QUANTIFERON TB2 TUBE: 0.01

## 2023-11-19 LAB
GAMMA INTERFERON BACKGROUND BLD IA-ACNC: 0.01 IU/ML
M TB IFN-G BLD-IMP: NEGATIVE
M TB IFN-G CD4+ BCKGRND COR BLD-ACNC: 1.27 IU/ML
MITOGEN IGNF BCKGRD COR BLD-ACNC: 0 IU/ML
MITOGEN IGNF BCKGRD COR BLD-ACNC: 0 IU/ML

## 2023-11-19 NOTE — PROGRESS NOTES
[facility] GERIATRICS    Chief Complaint   Patient presents with    Nursing Home Acute     HPI:  Linda Loredo is a 78 year old  (1945), who is being seen today for an episodic care visit at: [facility] ([facility]) [[id number]]. Today's concern is: The primary encounter diagnosis was Physical deconditioning. Diagnoses of Generalized muscle weakness, Diabetic ulcer of left heel associated with type 2 diabetes mellitus, with muscle involvement without evidence of necrosis (H), 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), Status post debridement, Osteomyelitis of left foot, unspecified type (H), Opioid dependence, uncomplicated (H), Chronic pain syndrome, Venous stasis ulcers of both lower extremities (H), Lymphedema, Type 2 diabetes mellitus with foot ulcer, with long-term current use of insulin (H), Obesity, Class III, BMI 40-49.9 (morbid obesity) (H), Slow transit constipation, Paroxysmal atrial fibrillation (H), Long term (current) use of anticoagulants, Warfarin-induced coagulopathy , Essential hypertension with goal blood pressure less than 140/90, Mixed hyperlipidemia, Stage 3 chronic kidney disease, unspecified whether stage 3a or 3b CKD (H), Chronic right-sided heart failure (H), Failure to thrive in adult, Anxiety, Major depressive disorder, recurrent episode, moderate (H), Chronic obstructive pulmonary disease, unspecified COPD type (H), BERLIN (obstructive sleep apnea), Dermatitis, Skin lesion, Primary hyperparathyroidism (H24), PICC (peripherally inserted central catheter) flush, Edema of right lower extremity, Redness, Pressure injury of left buttock, unstageable (H), and Drug-induced constipation were also pertinent to this visit.    Met with patient who denies any chest pain, palpitations, shortness of breath, RASHID, lightheadedness, dizziness, or cough. Denies any abdominal discomfort. Denies N&V. Denies dysuria or frequency. Reports  "stools have been loose S/T currently on increased BM regimen S/T history of constipation. She is open to changing these to PRN. Appetite good. Sleeping well. Increased pain with warmth and redness to RLE over the past several days per her report. Tolerable pain to left stump region today. Patient is worried about darkened areas to left stump today. Will send update to vascular team to notify for review. PICC team did not come out to address blood return on PICC. Staff to contact them again as ordered. History of reports of skin breakdown in the past to buttock region, however these areas have healed. Now developed upper buttock pressure injuries. Suspect due to friction S/T slide board, however patient does not agree. She believes this is from stool being left on skin without proper hygiene clean up. I educated and advised to apply foam dressings which she refused. I also educated use of slide board which I suspect is cause, however she declines.     BP Readings from Last 3 Encounters:   11/20/23 125/74   11/17/23 128/85   11/15/23 (!) 149/80     Wt Readings from Last 5 Encounters:   11/20/23 100.5 kg (221 lb 9.6 oz)   11/17/23 104.3 kg (230 lb)   11/12/23 104.4 kg (230 lb 3.2 oz)   10/24/23 111.2 kg (245 lb 2.4 oz)   09/26/23 120.2 kg (265 lb)     Allergies, and PMH/PSH reviewed in EPIC today.  REVIEW OF SYSTEMS:  10 point ROS of systems including Constitutional, Eyes, Respiratory, Cardiovascular, Gastroenterology, Genitourinary, Integumentary, Musculoskeletal, Psychiatric were all negative except for pertinent positives noted in my HPI.    Objective:   /74   Pulse 80   Temp 97.7  F (36.5  C)   Resp 17   Ht 1.676 m (5' 6\")   Wt 100.5 kg (221 lb 9.6 oz)   SpO2 100%   BMI 35.77 kg/m    GENERAL APPEARANCE:  Alert, in no distress, oriented, cooperative  ENT:  Mouth and posterior oropharynx normal, moist mucous membranes, normal hearing acuity  EYES:  EOM, conjunctivae, lids, pupils and irises normal  NECK: "  No adenopathy,masses or thyromegaly  RESP:  respiratory effort and palpation of chest normal, lungs clear to auscultation , no respiratory distress  CV:  Palpation and auscultation of heart done , regular rate and rhythm, no murmur, rub, or gallop, peripheral edema 1+ in RLE with increased redness and warmth  ABDOMEN:  normal bowel sounds, soft, nontender, no hepatosplenomegaly or other masses, no guarding or rebound  M/S:   Slide board transfers. Wheelchair bound.   SKIN:  see photo of left stump wound below. Increased redness with warmth to RLE as noted. Increased dry skin to right heel. Pressure injury noted to bilateral buttock S/T friction.   NEURO:   Cranial nerves 2-12 are normal tested and grossly at patient's baseline, no purposeful movement in upper and lower extremities  PSYCH:  oriented X 3, normal insight, judgement and memory, affect and mood normal                        Most Recent 3 CBC's:  Recent Labs   Lab Test 11/17/23  1033 11/01/23  0613 10/21/23  0637   WBC 7.8 6.4 7.3   HGB 10.5* 9.0* 8.3*   MCV 95 96 98    268 234     Most Recent 3 BMP's:  Recent Labs   Lab Test 11/17/23  1033 11/15/23  0800 11/14/23  2046 11/01/23  0814 11/01/23  0613 10/21/23  0805 10/21/23  0637     --   --   --  140  --  142   POTASSIUM 4.3  --   --   --  4.4  --  4.2   CHLORIDE 101  --   --   --  100  --  102   CO2 29  --   --   --  29  --  32*   BUN 27.9*  --   --   --  18.0  --  20.3   CR 0.93  --   --   --  0.96*  --  1.02*   ANIONGAP 11  --   --   --  11  --  8   ZAKIA 10.9*  --   --   --  9.8  --  9.9   * 141* 224*   < > 149*   < > 158*    < > = values in this interval not displayed.     Most Recent 2 LFT's:  Recent Labs   Lab Test 11/17/23  1033 11/01/23  0613   AST 20 19   ALT 11 12   ALKPHOS 90 97   BILITOTAL 0.5 0.2     Most Recent 3 INR's:  Recent Labs   Lab Test 11/17/23  1033 11/15/23  0644 11/14/23  0606   INR 1.69* 2.45* 2.26*     Most Recent Anemia Panel:  Recent Labs   Lab Test  11/17/23  1033   WBC 7.8   HGB 10.5*   HCT 34.4*   MCV 95          ASSESSMENT/PLAN:     (R53.81) Physical deconditioning  (primary encounter diagnosis)  (M62.81) Generalized muscle weakness  Comment: Acute on chronic. S/T below diagnosis  Plan:   -Continue Physical therapy and Occupational therapy as directed  -SW to remain involved for safe discharge planning needs     (E11.621,  L97.425) Diabetic ulcer of left heel associated with type 2 diabetes mellitus, with muscle involvement without evidence of necrosis (H)  (Z89.512) 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)  (Z98.890) Status post debridement  (M86.9) Osteomyelitis of left foot, unspecified type (H)  (F11.20) Opioid dependence, uncomplicated (H)  (G89.4) Chronic pain syndrome  (I83.019,  I83.029,  L97.919,  L97.929) Venous stasis ulcers of both lower extremities (H)  (I89.0) Lymphedema  Comment: Chronic. S/p debridement 9/26/23. S/p Left guillotine BKA on 10/7/23. S/p tibial/fibular resection and debridement of stump 10/17/23. Followed by vascular. Completed course of meropenem/vancomycin 10/8 for osteomyelitis. Vascular surgery planning to close in the future, monthly clinic visits for wound checks with closure at unspecified time in the future  Plan:   -Monitor pain complaints  -In house wound provider to follow for ongoing needs  -Continue methadone 10mg daily at 1500 and 5mg TID scheduled.   -Tylenol PRN  -Continue triple lumen PICC to RUE as directed. Added daily flush and PRN along with dressing weekly changes. Staff to contact PICC team today to come out to assess PICC and to assist with PICC de-clogging needs. Would recommend removing PICC at this time due to non use, however patient wants to keep due to pending future surgery needs. No surgery date confirmed.   -BMP, CBC, INR, TSH, and free T4 due 11/24/23  -Follow up with vascular as directed. Scheduled for 12/11/23 and 1/15/24  -Apply hemp lotion per her request to right  heel dry skin BID  -Continue current wound cares as directed;  *Wound Care - Left BKA : *Change daily* 1. Cleanse w/Vashe soaked gauze, including sreekanth wound skin, gentle pat dry. 2. Apply skin prep to sreekanth wound skin. 3. Soak Hydrofera Blue with saline and place over wound bed, cover with abd, and wrap with kerlix to secure.     (E11.621,  L97.509,  Z79.4) Type 2 diabetes mellitus with foot ulcer, with long-term current use of insulin (H)  (E66.01) Obesity, Class III, BMI 40-49.9 (morbid obesity) (H)  Comment: Chronic. Last A1c 7% in sept 2023. Goal <9%.   Plan:   -Monitor Blood Glucose QID as directed  -Continue sliding scale insulin as directed TID with meals and HS  -Continue tresiba 12 units at HS  -Recommend hgb A1c due Dec 2023.   -BMP, CBC, INR, TSH, and free T4 due 11/24/23         (K59.01) Slow transit constipation  Comment: Acute on chronic. Improving with regimen below.   Plan:   -Monitor BM patterns  -Change miralax to 17gm BID PRN  -Change senna S to 2 tabs BID PRN  -Zofran PRN  -Bisacodyl daily PRN  -BMP, CBC, INR, TSH, and free T4 due 11/24/23     (I48.0) Paroxysmal atrial fibrillation (H)  (Z79.01) Long term (current) use of anticoagulants  (D68.32,  T45.515A) Warfarin-induced coagulopathy   (I10) Essential hypertension with goal blood pressure less than 140/90  (E78.2) Mixed hyperlipidemia  (N18.30) Stage 3 chronic kidney disease, unspecified whether stage 3a or 3b CKD (H)  (I50.812) Chronic right-sided heart failure (H)  Comment: Chronic. Baseline Creatinine~ 1-1.2. Based on JNC-8 goals,  patients age of 78 year old, presence of diabetes or CKD, and goals of care goal BP is  <140/90 mm Hg. Patient is stable with current plan of care and routine assessment..Holding lisinopril per chart review per patient wishes. INR goal 2-3.INR 11/17/23-1.69  Plan:   -Monitor for worsening s/symptoms of concerns  -Monitor BP and HR as directed  -Continue asa 81mg daily  -Continue atorvastatin 40mg daily  -Continue  coumadin 2.5mg on mon/thurs and 3mg AOD  -Continue Torsemide 15mg daily. HOLD if SBP<100  -BMP, CBC, INR, TSH, and free T4 due 11/24/23     (R62.7) Failure to thrive in adult  (F41.9) Anxiety  (F33.1) Major depressive disorder, recurrent episode, moderate (H)  Comment: Chronic. S/T current health condition  Plan:   -Monitor mood and behaviors  -Monitor for worsening s/symptoms of concerns  -Dietician to remain involved  -ACP offered while on TCU  -Monitor for changes in mobility, eating and sleeping patterns  -BMP, CBC, INR, TSH, and free T4 due 11/24/23    (R60.0) Edema of right extremity  (L53.9) Redness  Comment: Acute. Noted increased redness to RLE with warmth and pain. Currently on coumadin.   Plan:  -Ultrasound to RLE STAT  -Apply bactroban to areas BID  -Monitor for worsening s/symptoms of concerns  -BMP, CBC, INR, TSH, and free T4 due 11/24/23        (J44.9) Chronic obstructive pulmonary disease, unspecified COPD type (H)  (G47.33) BERLIN (obstructive sleep apnea)  Comment: Chronic. Non compliant with CPAP use.   Plan:   -Monitor respiratory status  -Would recommend CPAP restart if compliant  -BMP, CBC, INR, TSH, and free T4 due 11/24/23     (L30.9) Dermatitis  Comment: Acute on chronic. History of allergic reaction to bilateral hands per chart review.   Plan:   -Monitor for worsening s/symptoms of concerns  -Hydrocortisone 2.5% topically BID  -BMP, CBC, INR, TSH, and free T4 due 11/24/23     (L98.9) Skin lesion  Comment: Acute on chronic. Noted to RLE per chart review.   Plan:   -In house provider to continue to follow  -Monitor for worsening s/symptoms of concerns  -Bactroban cream to areas BID per orders  -BMP, CBC, INR, TSH, and free T4 due 11/24/23     (E21.0) Primary hyperparathyroidism (H24)  Comment: Acute. Noted on chart review. Last TSH level in 09/2022 was 1.73.   Plan:   -Not currently on any supplementation.   -Monitor for worsening s/symptoms of concerns  -BMP, CBC, INR, TSH, and free T4 due  11/24/23     (L89.320) Pressure injury of left buttock  Comment: Acute. History of reports of skin breakdown in the past to buttock region, however these areas have healed. Now developed upper buttock pressure injuries. Suspect due to friction S/T slide board, however patient does not agree. She believes this is from stool being left on skin without proper hygiene clean up. I educated and advised to apply foam dressings which she refused. I also educated use of slide board which I suspect is cause, however she declines.   Plan:  -Monitor skin for further breakdown  -Continue therapies for appropriate slide board use.   -In house skin/wound provider to follow  -Monitor for worsening s/symptoms of concerns  -BMP, CBC, INR, TSH, and free T4 due 11/24/23     Electronically signed by:  Dr. Haylie Rowe DNP, APRN, FNP-C, WCS-C, EDS-C

## 2023-11-20 ENCOUNTER — TRANSITIONAL CARE UNIT VISIT (OUTPATIENT)
Dept: GERIATRICS | Facility: CLINIC | Age: 78
End: 2023-11-20
Payer: COMMERCIAL

## 2023-11-20 VITALS
DIASTOLIC BLOOD PRESSURE: 74 MMHG | RESPIRATION RATE: 17 BRPM | TEMPERATURE: 97.7 F | SYSTOLIC BLOOD PRESSURE: 125 MMHG | BODY MASS INDEX: 35.62 KG/M2 | OXYGEN SATURATION: 100 % | HEART RATE: 80 BPM | WEIGHT: 221.6 LBS | HEIGHT: 66 IN

## 2023-11-20 DIAGNOSIS — L53.9 REDNESS: ICD-10-CM

## 2023-11-20 DIAGNOSIS — N18.30 STAGE 3 CHRONIC KIDNEY DISEASE, UNSPECIFIED WHETHER STAGE 3A OR 3B CKD (H): ICD-10-CM

## 2023-11-20 DIAGNOSIS — G47.33 OSA (OBSTRUCTIVE SLEEP APNEA): ICD-10-CM

## 2023-11-20 DIAGNOSIS — E11.621 TYPE 2 DIABETES MELLITUS WITH FOOT ULCER, WITH LONG-TERM CURRENT USE OF INSULIN (H): ICD-10-CM

## 2023-11-20 DIAGNOSIS — I83.019 VENOUS STASIS ULCERS OF BOTH LOWER EXTREMITIES (H): ICD-10-CM

## 2023-11-20 DIAGNOSIS — I10 ESSENTIAL HYPERTENSION WITH GOAL BLOOD PRESSURE LESS THAN 140/90: ICD-10-CM

## 2023-11-20 DIAGNOSIS — K59.03 DRUG-INDUCED CONSTIPATION: ICD-10-CM

## 2023-11-20 DIAGNOSIS — Z45.2 PICC (PERIPHERALLY INSERTED CENTRAL CATHETER) FLUSH: ICD-10-CM

## 2023-11-20 DIAGNOSIS — G89.4 CHRONIC PAIN SYNDROME: ICD-10-CM

## 2023-11-20 DIAGNOSIS — I48.0 PAROXYSMAL ATRIAL FIBRILLATION (H): ICD-10-CM

## 2023-11-20 DIAGNOSIS — I50.812 CHRONIC RIGHT-SIDED HEART FAILURE (H): ICD-10-CM

## 2023-11-20 DIAGNOSIS — R60.0 EDEMA OF RIGHT LOWER EXTREMITY: ICD-10-CM

## 2023-11-20 DIAGNOSIS — L97.425 DIABETIC ULCER OF LEFT HEEL ASSOCIATED WITH TYPE 2 DIABETES MELLITUS, WITH MUSCLE INVOLVEMENT WITHOUT EVIDENCE OF NECROSIS (H): ICD-10-CM

## 2023-11-20 DIAGNOSIS — I83.029 VENOUS STASIS ULCERS OF BOTH LOWER EXTREMITIES (H): ICD-10-CM

## 2023-11-20 DIAGNOSIS — E21.0 PRIMARY HYPERPARATHYROIDISM (H): ICD-10-CM

## 2023-11-20 DIAGNOSIS — D68.32 WARFARIN-INDUCED COAGULOPATHY (H): ICD-10-CM

## 2023-11-20 DIAGNOSIS — F33.1 MAJOR DEPRESSIVE DISORDER, RECURRENT EPISODE, MODERATE (H): ICD-10-CM

## 2023-11-20 DIAGNOSIS — L98.9 SKIN LESION: ICD-10-CM

## 2023-11-20 DIAGNOSIS — T45.515A WARFARIN-INDUCED COAGULOPATHY (H): ICD-10-CM

## 2023-11-20 DIAGNOSIS — Z89.512 STATUS POST BELOW-KNEE AMPUTATION OF LEFT LOWER EXTREMITY (H): ICD-10-CM

## 2023-11-20 DIAGNOSIS — L97.929 VENOUS STASIS ULCERS OF BOTH LOWER EXTREMITIES (H): ICD-10-CM

## 2023-11-20 DIAGNOSIS — Z98.890 STATUS POST DEBRIDEMENT: ICD-10-CM

## 2023-11-20 DIAGNOSIS — I89.0 LYMPHEDEMA: ICD-10-CM

## 2023-11-20 DIAGNOSIS — E11.621 DIABETIC ULCER OF LEFT HEEL ASSOCIATED WITH TYPE 2 DIABETES MELLITUS, WITH MUSCLE INVOLVEMENT WITHOUT EVIDENCE OF NECROSIS (H): ICD-10-CM

## 2023-11-20 DIAGNOSIS — L97.919 VENOUS STASIS ULCERS OF BOTH LOWER EXTREMITIES (H): ICD-10-CM

## 2023-11-20 DIAGNOSIS — Z79.4 TYPE 2 DIABETES MELLITUS WITH FOOT ULCER, WITH LONG-TERM CURRENT USE OF INSULIN (H): ICD-10-CM

## 2023-11-20 DIAGNOSIS — J44.9 CHRONIC OBSTRUCTIVE PULMONARY DISEASE, UNSPECIFIED COPD TYPE (H): ICD-10-CM

## 2023-11-20 DIAGNOSIS — L89.320 PRESSURE INJURY OF LEFT BUTTOCK, UNSTAGEABLE (H): ICD-10-CM

## 2023-11-20 DIAGNOSIS — F41.9 ANXIETY: ICD-10-CM

## 2023-11-20 DIAGNOSIS — E66.01 OBESITY, CLASS III, BMI 40-49.9 (MORBID OBESITY) (H): ICD-10-CM

## 2023-11-20 DIAGNOSIS — L97.509 TYPE 2 DIABETES MELLITUS WITH FOOT ULCER, WITH LONG-TERM CURRENT USE OF INSULIN (H): ICD-10-CM

## 2023-11-20 DIAGNOSIS — R62.7 FAILURE TO THRIVE IN ADULT: ICD-10-CM

## 2023-11-20 DIAGNOSIS — M86.9 OSTEOMYELITIS OF LEFT FOOT, UNSPECIFIED TYPE (H): ICD-10-CM

## 2023-11-20 DIAGNOSIS — F11.20 OPIOID DEPENDENCE, UNCOMPLICATED (H): ICD-10-CM

## 2023-11-20 DIAGNOSIS — Z79.01 LONG TERM (CURRENT) USE OF ANTICOAGULANTS: ICD-10-CM

## 2023-11-20 DIAGNOSIS — M62.81 GENERALIZED MUSCLE WEAKNESS: ICD-10-CM

## 2023-11-20 DIAGNOSIS — E78.2 MIXED HYPERLIPIDEMIA: ICD-10-CM

## 2023-11-20 DIAGNOSIS — K59.01 SLOW TRANSIT CONSTIPATION: ICD-10-CM

## 2023-11-20 DIAGNOSIS — L30.9 DERMATITIS: ICD-10-CM

## 2023-11-20 DIAGNOSIS — R53.81 PHYSICAL DECONDITIONING: Primary | ICD-10-CM

## 2023-11-20 PROCEDURE — 99309 SBSQ NF CARE MODERATE MDM 30: CPT | Performed by: NURSE PRACTITIONER

## 2023-11-20 RX ORDER — POLYETHYLENE GLYCOL 3350 17 G/17G
17 POWDER, FOR SOLUTION ORAL 2 TIMES DAILY PRN
Start: 2023-11-20 | End: 2024-01-08

## 2023-11-20 RX ORDER — AMOXICILLIN 250 MG
2 CAPSULE ORAL 2 TIMES DAILY PRN
Start: 2023-11-20 | End: 2024-01-08

## 2023-11-20 NOTE — LETTER
11/20/2023        RE: Linda Loredo  [address]  [address]  [facility] [address]        [facility] GERIATRICS    Chief Complaint   Patient presents with     Nursing Home Acute     HPI:  Linda Loredo is a 78 year old  (1945), who is being seen today for an episodic care visit at: [facility] ([facility])([facility]) [[id number]]. Today's concern is: The primary encounter diagnosis was Physical deconditioning. Diagnoses of Generalized muscle weakness, Diabetic ulcer of left heel associated with type 2 diabetes mellitus, with muscle involvement without evidence of necrosis (H), 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), Status post debridement, Osteomyelitis of left foot, unspecified type (H), Opioid dependence, uncomplicated (H), Chronic pain syndrome, Venous stasis ulcers of both lower extremities (H), Lymphedema, Type 2 diabetes mellitus with foot ulcer, with long-term current use of insulin (H), Obesity, Class III, BMI 40-49.9 (morbid obesity) (H), Slow transit constipation, Paroxysmal atrial fibrillation (H), Long term (current) use of anticoagulants, Warfarin-induced coagulopathy , Essential hypertension with goal blood pressure less than 140/90, Mixed hyperlipidemia, Stage 3 chronic kidney disease, unspecified whether stage 3a or 3b CKD (H), Chronic right-sided heart failure (H), Failure to thrive in adult, Anxiety, Major depressive disorder, recurrent episode, moderate (H), Chronic obstructive pulmonary disease, unspecified COPD type (H), BERLIN (obstructive sleep apnea), Dermatitis, Skin lesion, Primary hyperparathyroidism (H24), PICC (peripherally inserted central catheter) flush, Edema of right lower extremity, Redness, and Pressure injury of left buttock, unstageable (H) were also pertinent to this visit.    Met with patient who denies any chest pain, palpitations, shortness of breath, RASHID, lightheadedness, dizziness, or cough.  "Denies any abdominal discomfort. Denies N&V. Denies dysuria or frequency. Reports stools have been loose S/T currently on increased BM regimen S/T history of constipation. She is open to changing these to PRN. Appetite good. Sleeping well. Increased pain with warmth and redness to RLE over the past several days per her report. Tolerable pain to left stump region today. Patient is worried about darkened areas to left stump today. Will send update to vascular team to notify for review. PICC team did not come out to address blood return on PICC. Staff to contact them again as ordered. History of reports of skin breakdown in the past to buttock region, however these areas have healed. Now developed upper buttock pressure injuries. Suspect due to friction S/T slide board, however patient does not agree. She believes this is from stool being left on skin without proper hygiene clean up. I educated and advised to apply foam dressings which she refused. I also educated use of slide board which I suspect is cause, however she declines.     BP Readings from Last 3 Encounters:   11/20/23 125/74   11/17/23 128/85   11/15/23 (!) 149/80     Wt Readings from Last 5 Encounters:   11/20/23 100.5 kg (221 lb 9.6 oz)   11/17/23 104.3 kg (230 lb)   11/12/23 104.4 kg (230 lb 3.2 oz)   10/24/23 111.2 kg (245 lb 2.4 oz)   09/26/23 120.2 kg (265 lb)     Allergies, and PMH/PSH reviewed in EPIC today.  REVIEW OF SYSTEMS:  10 point ROS of systems including Constitutional, Eyes, Respiratory, Cardiovascular, Gastroenterology, Genitourinary, Integumentary, Musculoskeletal, Psychiatric were all negative except for pertinent positives noted in my HPI.    Objective:   /74   Pulse 80   Temp 97.7  F (36.5  C)   Resp 17   Ht 1.676 m (5' 6\")   Wt 100.5 kg (221 lb 9.6 oz)   SpO2 100%   BMI 35.77 kg/m    GENERAL APPEARANCE:  Alert, in no distress, oriented, cooperative  ENT:  Mouth and posterior oropharynx normal, moist mucous membranes, " normal hearing acuity  EYES:  EOM, conjunctivae, lids, pupils and irises normal  NECK:  No adenopathy,masses or thyromegaly  RESP:  respiratory effort and palpation of chest normal, lungs clear to auscultation , no respiratory distress  CV:  Palpation and auscultation of heart done , regular rate and rhythm, no murmur, rub, or gallop, peripheral edema 1+ in RLE with increased redness and warmth  ABDOMEN:  normal bowel sounds, soft, nontender, no hepatosplenomegaly or other masses, no guarding or rebound  M/S:   Slide board transfers. Wheelchair bound.   SKIN:  see photo of left stump wound below. Increased redness with warmth to RLE as noted. Increased dry skin to right heel. Pressure injury noted to bilateral buttock S/T friction.   NEURO:   Cranial nerves 2-12 are normal tested and grossly at patient's baseline, no purposeful movement in upper and lower extremities  PSYCH:  oriented X 3, normal insight, judgement and memory, affect and mood normal                        Most Recent 3 CBC's:  Recent Labs   Lab Test 11/17/23  1033 11/01/23  0613 10/21/23  0637   WBC 7.8 6.4 7.3   HGB 10.5* 9.0* 8.3*   MCV 95 96 98    268 234     Most Recent 3 BMP's:  Recent Labs   Lab Test 11/17/23  1033 11/15/23  0800 11/14/23  2046 11/01/23  0814 11/01/23  0613 10/21/23  0805 10/21/23  0637     --   --   --  140  --  142   POTASSIUM 4.3  --   --   --  4.4  --  4.2   CHLORIDE 101  --   --   --  100  --  102   CO2 29  --   --   --  29  --  32*   BUN 27.9*  --   --   --  18.0  --  20.3   CR 0.93  --   --   --  0.96*  --  1.02*   ANIONGAP 11  --   --   --  11  --  8   ZAKIA 10.9*  --   --   --  9.8  --  9.9   * 141* 224*   < > 149*   < > 158*    < > = values in this interval not displayed.     Most Recent 2 LFT's:  Recent Labs   Lab Test 11/17/23  1033 11/01/23  0613   AST 20 19   ALT 11 12   ALKPHOS 90 97   BILITOTAL 0.5 0.2     Most Recent 3 INR's:  Recent Labs   Lab Test 11/17/23  1033 11/15/23  0644  11/14/23  0606   INR 1.69* 2.45* 2.26*     Most Recent Anemia Panel:  Recent Labs   Lab Test 11/17/23  1033   WBC 7.8   HGB 10.5*   HCT 34.4*   MCV 95          ASSESSMENT/PLAN:     (R53.81) Physical deconditioning  (primary encounter diagnosis)  (M62.81) Generalized muscle weakness  Comment: Acute on chronic. S/T below diagnosis  Plan:   -Continue Physical therapy and Occupational therapy as directed  -SW to remain involved for safe discharge planning needs     (E11.621,  L97.425) Diabetic ulcer of left heel associated with type 2 diabetes mellitus, with muscle involvement without evidence of necrosis (H)  (Z89.512) 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)  (Z98.890) Status post debridement  (M86.9) Osteomyelitis of left foot, unspecified type (H)  (F11.20) Opioid dependence, uncomplicated (H)  (G89.4) Chronic pain syndrome  (I83.019,  I83.029,  L97.919,  L97.929) Venous stasis ulcers of both lower extremities (H)  (I89.0) Lymphedema  Comment: Chronic. S/p debridement 9/26/23. S/p Left guillotine BKA on 10/7/23. S/p tibial/fibular resection and debridement of stump 10/17/23. Followed by vascular. Completed course of meropenem/vancomycin 10/8 for osteomyelitis. Vascular surgery planning to close in the future, monthly clinic visits for wound checks with closure at unspecified time in the future  Plan:   -Monitor pain complaints  -In house wound provider to follow for ongoing needs  -Continue methadone 10mg daily at 1500 and 5mg TID scheduled.   -Tylenol PRN  -Continue triple lumen PICC to RUE as directed. Added daily flush and PRN along with dressing weekly changes. Staff to contact PICC team today to come out to assess PICC and to assist with PICC de-clogging needs  -BMP, CBC, INR, TSH, and free T4 due 11/24/23  -Follow up with vascular as directed. Scheduled for 12/11/23 and 1/15/24  -Apply hemp lotion per her request to right heel dry skin BID  -Continue current wound cares as  directed;  *Wound Care - Left BKA : *Change daily* 1. Cleanse w/Vashe soaked gauze, including sreekanth wound skin, gentle pat dry. 2. Apply skin prep to sreekanth wound skin. 3. Soak Hydrofera Blue with saline and place over wound bed, cover with abd, and wrap with kerlix to secure.     (E11.621,  L97.509,  Z79.4) Type 2 diabetes mellitus with foot ulcer, with long-term current use of insulin (H)  (E66.01) Obesity, Class III, BMI 40-49.9 (morbid obesity) (H)  Comment: Chronic. Last A1c 7% in sept 2023. Goal <9%.   Plan:   -Monitor Blood Glucose QID as directed  -Continue sliding scale insulin as directed TID with meals and HS  -Continue tresiba 12 units at HS  -Recommend hgb A1c due Dec 2023.   -BMP, CBC, INR, TSH, and free T4 due 11/24/23         (K59.01) Slow transit constipation  Comment: Acute on chronic. Improving with regimen below.   Plan:   -Monitor BM patterns  -Change miralax to 17gm BID PRN  -Change senna S to 2 tabs BID PRN  -Zofran PRN  -Bisacodyl daily PRN  -BMP, CBC, INR, TSH, and free T4 due 11/24/23     (I48.0) Paroxysmal atrial fibrillation (H)  (Z79.01) Long term (current) use of anticoagulants  (D68.32,  T45.515A) Warfarin-induced coagulopathy   (I10) Essential hypertension with goal blood pressure less than 140/90  (E78.2) Mixed hyperlipidemia  (N18.30) Stage 3 chronic kidney disease, unspecified whether stage 3a or 3b CKD (H)  (I50.812) Chronic right-sided heart failure (H)  Comment: Chronic. Baseline Creatinine~ 1-1.2. Based on JNC-8 goals,  patients age of 78 year old, presence of diabetes or CKD, and goals of care goal BP is  <140/90 mm Hg. Patient is stable with current plan of care and routine assessment..Holding lisinopril per chart review per patient wishes. INR goal 2-3.INR 11/17/23-1.69  Plan:   -Monitor for worsening s/symptoms of concerns  -Monitor BP and HR as directed  -Continue asa 81mg daily  -Continue atorvastatin 40mg daily  -Continue coumadin 2.5mg on mon/thurs and 3mg AOD  -Continue  Torsemide 15mg daily. HOLD if SBP<100  -BMP, CBC, INR, TSH, and free T4 due 11/24/23     (R62.7) Failure to thrive in adult  (F41.9) Anxiety  (F33.1) Major depressive disorder, recurrent episode, moderate (H)  Comment: Chronic. S/T current health condition  Plan:   -Monitor mood and behaviors  -Monitor for worsening s/symptoms of concerns  -Dietician to remain involved  -ACP offered while on TCU  -Monitor for changes in mobility, eating and sleeping patterns  -BMP, CBC, INR, TSH, and free T4 due 11/24/23    (R60.0) Edema of right extremity  (L53.9) Redness  Comment: Acute. Noted increased redness to RLE with warmth and pain. Currently on coumadin.   Plan:  -Ultrasound to RLE STAT  -Apply bactroban to areas BID  -Monitor for worsening s/symptoms of concerns  -BMP, CBC, INR, TSH, and free T4 due 11/24/23    (J44.9) Chronic obstructive pulmonary disease, unspecified COPD type (H)  (G47.33) BERLIN (obstructive sleep apnea)  Comment: Chronic. Non compliant with CPAP use.   Plan:   -Monitor respiratory status  -Would recommend CPAP restart if compliant  -BMP, CBC, INR, TSH, and free T4 due 11/24/23     (L30.9) Dermatitis  Comment: Acute on chronic. History of allergic reaction to bilateral hands per chart review.   Plan:   -Monitor for worsening s/symptoms of concerns  -Hydrocortisone 2.5% topically BID  -BMP, CBC, INR, TSH, and free T4 due 11/24/23     (L98.9) Skin lesion  Comment: Acute on chronic. Noted to RLE per chart review.   Plan:   -In house provider to continue to follow  -Monitor for worsening s/symptoms of concerns  -Bactroban cream to areas BID per orders  -BMP, CBC, INR, TSH, and free T4 due 11/24/23     (E21.0) Primary hyperparathyroidism (H24)  Comment: Acute. Noted on chart review. Last TSH level in 09/2022 was 1.73.   Plan:   -Not currently on any supplementation.   -Monitor for worsening s/symptoms of concerns  -BMP, CBC, INR, TSH, and free T4 due 11/24/23     (L89.320) Pressure injury of left  buttock  Comment: Acute. History of reports of skin breakdown in the past to buttock region, however these areas have healed. Now developed upper buttock pressure injuries. Suspect due to friction S/T slide board, however patient does not agree. She believes this is from stool being left on skin without proper hygiene clean up. I educated and advised to apply foam dressings which she refused. I also educated use of slide board which I suspect is cause, however she declines.   Plan:  -Monitor skin for further breakdown  -Continue therapies for appropriate slide board use.   -In house skin/wound provider to follow  -Monitor for worsening s/symptoms of concerns  -BMP, CBC, INR, TSH, and free T4 due 11/24/23     Electronically signed by:  Dr. Haylie Rowe DNP, APRN, FNP-C, WCS-C, EDS-C             Sincerely,        Haylie Rowe, MOE CNP

## 2023-11-21 NOTE — PROGRESS NOTES
[facility] GERIATRICS    Chief Complaint   Patient presents with    RECHECK     HPI:  Linda Loredo is a 78 year old  (1945), who is being seen today for an episodic care visit at: [facility] ([facility])([facility]) [[id number]]. Today's concern is: The primary encounter diagnosis was Physical deconditioning. Diagnoses of Generalized muscle weakness, Diabetic ulcer of left heel associated with type 2 diabetes mellitus, with muscle involvement without evidence of necrosis (H), 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), Status post debridement, Osteomyelitis of left foot, unspecified type (H), Opioid dependence, uncomplicated (H), Chronic pain syndrome, Venous stasis ulcers of both lower extremities (H), Lymphedema, Type 2 diabetes mellitus with foot ulcer, with long-term current use of insulin (H), Obesity, Class III, BMI 40-49.9 (morbid obesity) (H), Slow transit constipation, Paroxysmal atrial fibrillation (H), Long term (current) use of anticoagulants, Warfarin-induced coagulopathy , Essential hypertension with goal blood pressure less than 140/90, Mixed hyperlipidemia, Stage 3 chronic kidney disease, unspecified whether stage 3a or 3b CKD (H), Chronic right-sided heart failure (H), Failure to thrive in adult, Anxiety, Major depressive disorder, recurrent episode, moderate (H), Chronic obstructive pulmonary disease, unspecified COPD type (H), BERLIN (obstructive sleep apnea), Dermatitis, Skin lesion, Primary hyperparathyroidism (H24), PICC (peripherally inserted central catheter) flush, Edema of right lower extremity, Redness, Pressure injury of right buttock, unstageable (H), and Cellulitis of right lower extremity were also pertinent to this visit.    Met with patient who was found upright sitting in wheelchair. She denies any chest pain, palpitations, shortness of breath, RASHID, lightheadedness, dizziness, or cough. Denies any abdominal discomfort. Denies  "N&V. Denies B&B concerns. Denies dysuria or frequency. Denies loose or constipation, reports stools have been looser now since being on aggressive BM management, these now are PRN. Requires maximum assistance for toileting cares and hygiene at this time. Slide board transfers. Appetite good. Sleeping well. Reports pain to wound areas about 7.5/10. Declines offer to restart dilaudid today. Had virtual visit with wound provider today, unknown of recommendations for wound management at this time. Increased edema to RLE with open wound present to back of right calf. She declines any trauma or hitting leg against something. PICC remains in place. Grey line was able to get blood return. No blood return on other ports. Poor compliance with removal of PICC at this time. PICC was placed 9/29/23. She wants to keep in place until future surgery needs, however no surgery is scheduled at this time. If she does discharge home prior to surgery date, then PICC will need to be removed at that time due to no need. She reports ongoing concerns with nursing staff. I have directed her concerns to management team today.     BP Readings from Last 3 Encounters:   11/22/23 135/80   11/20/23 125/74   11/17/23 128/85     Wt Readings from Last 5 Encounters:   11/22/23 100.8 kg (222 lb 3.2 oz)   11/20/23 100.5 kg (221 lb 9.6 oz)   11/17/23 104.3 kg (230 lb)   11/12/23 104.4 kg (230 lb 3.2 oz)   10/24/23 111.2 kg (245 lb 2.4 oz)     Allergies, and PMH/PSH reviewed in EPIC today.  REVIEW OF SYSTEMS:  4 point ROS including Respiratory, CV, GI and , other than that noted in the HPI,  is negative    Objective:   /80   Pulse 94   Temp 98.4  F (36.9  C)   Resp 18   Ht 1.676 m (5' 6\")   Wt 100.8 kg (222 lb 3.2 oz)   SpO2 99%   BMI 35.86 kg/m    GENERAL APPEARANCE:  Alert, in no distress, oriented, morbidly obese, cooperative  ENT:  Mouth and posterior oropharynx normal, moist mucous membranes, normal hearing acuity  EYES:  EOM, " conjunctivae, lids, pupils and irises normal  NECK:  No adenopathy,masses or thyromegaly  RESP:  respiratory effort and palpation of chest normal, lungs clear to auscultation , no respiratory distress  CV:  Palpation and auscultation of heart done , regular rate and rhythm, no murmur, rub, or gallop, peripheral edema 1+ in RLE  ABDOMEN:  normal bowel sounds, soft, nontender, no hepatosplenomegaly or other masses, no guarding or rebound  M/S:   Slide board transfers. Wheelchair bound. Assist with all needs, ADLS, and transfers  SKIN:  Inspection of skin and subcutaneous tissue baseline, see photo. Stump wound present--appears stable. New open area to back of left calf present with increased redness present. Edema to RLE. See photos below  NEURO:   Cranial nerves 2-12 are normal tested and grossly at patient's baseline, no purposeful movement in upper and lower extremities  PSYCH:  oriented X 3, normal insight, judgement and memory, affect and mood normal                                Most Recent 3 CBC's:  Recent Labs   Lab Test 11/17/23  1033 11/01/23  0613 10/21/23  0637   WBC 7.8 6.4 7.3   HGB 10.5* 9.0* 8.3*   MCV 95 96 98    268 234     Most Recent 3 BMP's:  Recent Labs   Lab Test 11/17/23  1033 11/15/23  0800 11/14/23  2046 11/01/23  0814 11/01/23  0613 10/21/23  0805 10/21/23  0637     --   --   --  140  --  142   POTASSIUM 4.3  --   --   --  4.4  --  4.2   CHLORIDE 101  --   --   --  100  --  102   CO2 29  --   --   --  29  --  32*   BUN 27.9*  --   --   --  18.0  --  20.3   CR 0.93  --   --   --  0.96*  --  1.02*   ANIONGAP 11  --   --   --  11  --  8   ZAKIA 10.9*  --   --   --  9.8  --  9.9   * 141* 224*   < > 149*   < > 158*    < > = values in this interval not displayed.     Most Recent 2 LFT's:  Recent Labs   Lab Test 11/17/23  1033 11/01/23  0613   AST 20 19   ALT 11 12   ALKPHOS 90 97   BILITOTAL 0.5 0.2     Most Recent 3 INR's:  Recent Labs   Lab Test 11/17/23  1033 11/15/23  0644  11/14/23  0606   INR 1.69* 2.45* 2.26*     Most Recent Cholesterol Panel:  Recent Labs   Lab Test 09/30/23  1620   CHOL 82   LDL 37   HDL 35*   TRIG 49     Most Recent Hemoglobin A1c:  Recent Labs   Lab Test 09/23/23  0533   A1C 7.0*     Most Recent Anemia Panel:  Recent Labs   Lab Test 11/17/23  1033   WBC 7.8   HGB 10.5*   HCT 34.4*   MCV 95          ASSESSMENT/PLAN:     (R53.81) Physical deconditioning  (primary encounter diagnosis)  (M62.81) Generalized muscle weakness  Comment: Acute on chronic. S/T below diagnosis. Per therapy they project LCD potentially 12/1. Still requiring max assistance for toileting cares along with wound care needs  Plan:   -Continue Physical therapy and Occupational therapy as directed  -SW to remain involved for safe discharge planning needs  -Suspect will need DME needs for discharge planning goals.      (E11.621,  L97.425) Diabetic ulcer of left heel associated with type 2 diabetes mellitus, with muscle involvement without evidence of necrosis (H)  (Z89.512) 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)  (Z98.890) Status post debridement  (M86.9) Osteomyelitis of left foot, unspecified type (H)  (F11.20) Opioid dependence, uncomplicated (H)  (G89.4) Chronic pain syndrome  (I83.019,  I83.029,  L97.919,  L97.929) Venous stasis ulcers of both lower extremities (H)  (I89.0) Lymphedema  Comment: Chronic. S/p debridement 9/26/23. S/p Left guillotine BKA on 10/7/23. S/p tibial/fibular resection and debridement of stump 10/17/23. Followed by vascular. Completed course of meropenem/vancomycin 10/8 for osteomyelitis. Vascular surgery planning to close in the future, monthly clinic visits for wound checks with closure at unspecified time in the future  Plan:   -Monitor pain complaints  -In house wound provider to follow for ongoing needs (had virtual visit today 11/22/23)  -Continue methadone 10mg daily at 1500 and 5mg TID scheduled.   -Tylenol PRN  -Continue triple  lumen PICC to RUE as directed. Added daily flush and PRN along with dressing weekly changes. Would recommend removing PICC at this time due to non use, however patient wants to keep due to pending future surgery needs. No surgery date confirmed. Would need to remove if TCU discharge.   -BMP, CBC, INR, TSH, and free T4 due 11/24/23  -Follow up with vascular as directed. Scheduled for 12/11/23 and 1/15/24  -Apply hemp lotion per her request to right heel dry skin BID--keep at bedside and staff to apply accordingly  -Continue current wound cares as directed;  *Wound Care - Left BKA : *Change daily* 1. Cleanse w/Vashe soaked gauze, including sreekanth wound skin, gentle pat dry. 2. Apply skin prep to sreekanth wound skin. 3. Soak Hydrofera Blue with saline and place over wound bed, cover with abd, and wrap with kerlix to secure     (E11.621,  L97.509,  Z79.4) Type 2 diabetes mellitus with foot ulcer, with long-term current use of insulin (H)  (E66.01) Obesity, Class III, BMI 40-49.9 (morbid obesity) (H)  Comment: Chronic. Last A1c 7% in sept 2023. Goal <9%.   Plan:   -Monitor Blood Glucose QID as directed  -Continue sliding scale insulin as directed TID with meals and HS  -Continue tresiba 12 units at HS  -Recommend hgb A1c due Dec 2023.   -BMP, CBC, INR, TSH, and free T4 due 11/24/23     (K59.01) Slow transit constipation  Comment: Acute on chronic. Stable  Plan:   -Monitor BM patterns  -Continue miralax 17gm BID PRN  -Continue senna S 2 tabs BID PRN  -Zofran PRN  -Bisacodyl daily PRN  -BMP, CBC, INR, TSH, and free T4 due 11/24/23     (I48.0) Paroxysmal atrial fibrillation (H)  (Z79.01) Long term (current) use of anticoagulants  (D68.32,  T45.515A) Warfarin-induced coagulopathy   (I10) Essential hypertension with goal blood pressure less than 140/90  (E78.2) Mixed hyperlipidemia  (N18.30) Stage 3 chronic kidney disease, unspecified whether stage 3a or 3b CKD (H)  (I50.812) Chronic right-sided heart failure (H)  Comment: Chronic.  Baseline Creatinine~ 1-1.2. Based on JNC-8 goals,  patients age of 78 year old, presence of diabetes or CKD, and goals of care goal BP is  <140/90 mm Hg. Patient is stable with current plan of care and routine assessment..Holding lisinopril per chart review per patient wishes. INR goal 2-3.INR 11/17/23-1.69  Plan:   -Monitor for worsening s/symptoms of concerns  -Monitor BP and HR as directed  -Continue asa 81mg daily  -Continue atorvastatin 40mg daily  -Continue coumadin 2.5mg on mon/thurs and 3mg AOD  -Monitor weights as directed. Baseline weight around 222-224#.   -Continue Torsemide 15mg daily. HOLD if SBP<100  -BMP, CBC, INR, TSH, and free T4 due 11/24/23     (R62.7) Failure to thrive in adult  (F41.9) Anxiety  (F33.1) Major depressive disorder, recurrent episode, moderate (H)  Comment: Chronic. S/T current health condition  Plan:   -Monitor mood and behaviors  -Monitor for worsening s/symptoms of concerns  -Dietician to remain involved  -ACP offered while on TCU and declined at this time  -Monitor for changes in mobility, eating and sleeping patterns  -BMP, CBC, INR, TSH, and free T4 due 11/24/23     (R60.0) Edema of right extremity  (L53.9) Redness  (L03.115) Cellulitis of right lower extremity  Comment: Acute. Noted increased redness to RLE with warmth and pain. Currently on coumadin. US negative for DVT risks. Now open area noted to back of calf. Due to increased redness with wound present will treat for suspected cellulitis. Unable to start prednisone given allergy risk. Suspect edema may also be contributing to wound concerns to RLE.   Plan:  -Start wound care to RLE for now:   *Cleanse with normal saline. Apply bactroban to open areas. Leave non open areas to air. Cover weeping wounds with mepilex. Perform daily and PRN  -Recommend patient brings in velcro wraps for use to assist with edema control.   -Start cephalexin 500mg TID x 10 days for suspected cellulitis  -Unable to start prednisone due to  allergy of N&V risks.  -Monitor for worsening s/symptoms of concerns  -BMP, CBC, INR, TSH, and free T4 due 11/24/23          (J44.9) Chronic obstructive pulmonary disease, unspecified COPD type (H)  (G47.33) BERLIN (obstructive sleep apnea)  Comment: Chronic. Non compliant with CPAP use.   Plan:   -Monitor respiratory status  -Would recommend CPAP restart if compliant  -BMP, CBC, INR, TSH, and free T4 due 11/24/23     (L30.9) Dermatitis  Comment: Acute on chronic. History of allergic reaction to bilateral hands per chart review. IMPROVED  Plan:   -Monitor for worsening s/symptoms of concerns  -Hydrocortisone 2.5% topically BID to hands  -BMP, CBC, INR, TSH, and free T4 due 11/24/23     (E21.0) Primary hyperparathyroidism (H24)  Comment: Acute. Noted on chart review. Last TSH level in 09/2022 was 1.73.   Plan:   -Not currently on any supplementation.   -Monitor for worsening s/symptoms of concerns  -BMP, CBC, INR, TSH, and free T4 due 11/24/23     (L89.320) Pressure injury of left buttock  Comment: Acute. History of reports of skin breakdown in the past to buttock region, however these areas have healed PTA. Now developed upper buttock pressure injuries. Suspect due to friction S/T slide board, however patient does not agree. She believes this is from stool being left on skin without proper hygiene clean up. I educated and advised to apply foam dressings which she refused. I also educated use of slide board which I suspect is cause, however she declines.   Plan:  -Monitor skin for further breakdown  -Continue therapies for appropriate slide board use. I did discuss with therapy today to monitor this with appropriate transfer needs.   -In house skin/wound provider to follow  -Would recommend mepilex to prevent skin breakdown, but she declines at this time.   -Monitor for worsening s/symptoms of concerns  -BMP, CBC, INR, TSH, and free T4 due 11/24/23     Electronically signed by:  Dr. Haylie Rowe DNP, APRN, FNP-C,  WCS-C, EDS-C

## 2023-11-22 ENCOUNTER — TRANSITIONAL CARE UNIT VISIT (OUTPATIENT)
Dept: GERIATRICS | Facility: CLINIC | Age: 78
End: 2023-11-22
Payer: COMMERCIAL

## 2023-11-22 VITALS
SYSTOLIC BLOOD PRESSURE: 135 MMHG | HEIGHT: 66 IN | RESPIRATION RATE: 18 BRPM | OXYGEN SATURATION: 99 % | DIASTOLIC BLOOD PRESSURE: 80 MMHG | BODY MASS INDEX: 35.71 KG/M2 | WEIGHT: 222.2 LBS | HEART RATE: 94 BPM | TEMPERATURE: 98.4 F

## 2023-11-22 DIAGNOSIS — F33.1 MAJOR DEPRESSIVE DISORDER, RECURRENT EPISODE, MODERATE (H): ICD-10-CM

## 2023-11-22 DIAGNOSIS — G89.4 CHRONIC PAIN SYNDROME: ICD-10-CM

## 2023-11-22 DIAGNOSIS — Z79.4 TYPE 2 DIABETES MELLITUS WITH FOOT ULCER, WITH LONG-TERM CURRENT USE OF INSULIN (H): ICD-10-CM

## 2023-11-22 DIAGNOSIS — E78.2 MIXED HYPERLIPIDEMIA: ICD-10-CM

## 2023-11-22 DIAGNOSIS — J44.9 CHRONIC OBSTRUCTIVE PULMONARY DISEASE, UNSPECIFIED COPD TYPE (H): ICD-10-CM

## 2023-11-22 DIAGNOSIS — I83.019 VENOUS STASIS ULCERS OF BOTH LOWER EXTREMITIES (H): ICD-10-CM

## 2023-11-22 DIAGNOSIS — E21.0 PRIMARY HYPERPARATHYROIDISM (H): ICD-10-CM

## 2023-11-22 DIAGNOSIS — Z79.01 LONG TERM (CURRENT) USE OF ANTICOAGULANTS: ICD-10-CM

## 2023-11-22 DIAGNOSIS — G47.33 OSA (OBSTRUCTIVE SLEEP APNEA): ICD-10-CM

## 2023-11-22 DIAGNOSIS — I89.0 LYMPHEDEMA: ICD-10-CM

## 2023-11-22 DIAGNOSIS — L30.9 DERMATITIS: ICD-10-CM

## 2023-11-22 DIAGNOSIS — M62.81 GENERALIZED MUSCLE WEAKNESS: ICD-10-CM

## 2023-11-22 DIAGNOSIS — L89.310 PRESSURE INJURY OF RIGHT BUTTOCK, UNSTAGEABLE (H): ICD-10-CM

## 2023-11-22 DIAGNOSIS — R60.0 EDEMA OF RIGHT LOWER EXTREMITY: ICD-10-CM

## 2023-11-22 DIAGNOSIS — L03.115 CELLULITIS OF RIGHT LOWER EXTREMITY: ICD-10-CM

## 2023-11-22 DIAGNOSIS — L98.9 SKIN LESION: ICD-10-CM

## 2023-11-22 DIAGNOSIS — I10 ESSENTIAL HYPERTENSION WITH GOAL BLOOD PRESSURE LESS THAN 140/90: ICD-10-CM

## 2023-11-22 DIAGNOSIS — E66.01 OBESITY, CLASS III, BMI 40-49.9 (MORBID OBESITY) (H): ICD-10-CM

## 2023-11-22 DIAGNOSIS — Z45.2 PICC (PERIPHERALLY INSERTED CENTRAL CATHETER) FLUSH: ICD-10-CM

## 2023-11-22 DIAGNOSIS — E11.621 DIABETIC ULCER OF LEFT HEEL ASSOCIATED WITH TYPE 2 DIABETES MELLITUS, WITH MUSCLE INVOLVEMENT WITHOUT EVIDENCE OF NECROSIS (H): ICD-10-CM

## 2023-11-22 DIAGNOSIS — Z98.890 STATUS POST DEBRIDEMENT: ICD-10-CM

## 2023-11-22 DIAGNOSIS — F11.20 OPIOID DEPENDENCE, UNCOMPLICATED (H): ICD-10-CM

## 2023-11-22 DIAGNOSIS — L97.509 TYPE 2 DIABETES MELLITUS WITH FOOT ULCER, WITH LONG-TERM CURRENT USE OF INSULIN (H): ICD-10-CM

## 2023-11-22 DIAGNOSIS — L97.919 VENOUS STASIS ULCERS OF BOTH LOWER EXTREMITIES (H): ICD-10-CM

## 2023-11-22 DIAGNOSIS — E11.621 TYPE 2 DIABETES MELLITUS WITH FOOT ULCER, WITH LONG-TERM CURRENT USE OF INSULIN (H): ICD-10-CM

## 2023-11-22 DIAGNOSIS — R62.7 FAILURE TO THRIVE IN ADULT: ICD-10-CM

## 2023-11-22 DIAGNOSIS — K59.01 SLOW TRANSIT CONSTIPATION: ICD-10-CM

## 2023-11-22 DIAGNOSIS — D68.32 WARFARIN-INDUCED COAGULOPATHY (H): ICD-10-CM

## 2023-11-22 DIAGNOSIS — I50.812 CHRONIC RIGHT-SIDED HEART FAILURE (H): ICD-10-CM

## 2023-11-22 DIAGNOSIS — L97.929 VENOUS STASIS ULCERS OF BOTH LOWER EXTREMITIES (H): ICD-10-CM

## 2023-11-22 DIAGNOSIS — Z89.512 STATUS POST BELOW-KNEE AMPUTATION OF LEFT LOWER EXTREMITY (H): ICD-10-CM

## 2023-11-22 DIAGNOSIS — L97.425 DIABETIC ULCER OF LEFT HEEL ASSOCIATED WITH TYPE 2 DIABETES MELLITUS, WITH MUSCLE INVOLVEMENT WITHOUT EVIDENCE OF NECROSIS (H): ICD-10-CM

## 2023-11-22 DIAGNOSIS — M86.9 OSTEOMYELITIS OF LEFT FOOT, UNSPECIFIED TYPE (H): ICD-10-CM

## 2023-11-22 DIAGNOSIS — F41.9 ANXIETY: ICD-10-CM

## 2023-11-22 DIAGNOSIS — N18.30 STAGE 3 CHRONIC KIDNEY DISEASE, UNSPECIFIED WHETHER STAGE 3A OR 3B CKD (H): ICD-10-CM

## 2023-11-22 DIAGNOSIS — T45.515A WARFARIN-INDUCED COAGULOPATHY (H): ICD-10-CM

## 2023-11-22 DIAGNOSIS — I48.0 PAROXYSMAL ATRIAL FIBRILLATION (H): ICD-10-CM

## 2023-11-22 DIAGNOSIS — R53.81 PHYSICAL DECONDITIONING: Primary | ICD-10-CM

## 2023-11-22 DIAGNOSIS — I83.029 VENOUS STASIS ULCERS OF BOTH LOWER EXTREMITIES (H): ICD-10-CM

## 2023-11-22 DIAGNOSIS — L53.9 REDNESS: ICD-10-CM

## 2023-11-22 PROCEDURE — 99309 SBSQ NF CARE MODERATE MDM 30: CPT | Performed by: NURSE PRACTITIONER

## 2023-11-22 RX ORDER — CEPHALEXIN 500 MG/1
500 CAPSULE ORAL 3 TIMES DAILY
Qty: 30 CAPSULE | Refills: 0 | Status: SHIPPED | OUTPATIENT
Start: 2023-11-22 | End: 2023-12-04

## 2023-11-22 NOTE — LETTER
11/22/2023        RE: Linda Loredo  [address]  [address]  [facility] [address]        [facility] GERIATRICS    Chief Complaint   Patient presents with     RECHECK     HPI:  Linda Loredo is a 78 year old  (1945), who is being seen today for an episodic care visit at: [facility] ([facility])() [[id number]]. Today's concern is: The primary encounter diagnosis was Physical deconditioning. Diagnoses of Generalized muscle weakness, Diabetic ulcer of left heel associated with type 2 diabetes mellitus, with muscle involvement without evidence of necrosis (H), 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), Status post debridement, Osteomyelitis of left foot, unspecified type (H), Opioid dependence, uncomplicated (H), Chronic pain syndrome, Venous stasis ulcers of both lower extremities (H), Lymphedema, Type 2 diabetes mellitus with foot ulcer, with long-term current use of insulin (H), Obesity, Class III, BMI 40-49.9 (morbid obesity) (H), Slow transit constipation, Paroxysmal atrial fibrillation (H), Long term (current) use of anticoagulants, Warfarin-induced coagulopathy , Essential hypertension with goal blood pressure less than 140/90, Mixed hyperlipidemia, Stage 3 chronic kidney disease, unspecified whether stage 3a or 3b CKD (H), Chronic right-sided heart failure (H), Failure to thrive in adult, Anxiety, Major depressive disorder, recurrent episode, moderate (H), Chronic obstructive pulmonary disease, unspecified COPD type (H), BERLIN (obstructive sleep apnea), Dermatitis, Skin lesion, Primary hyperparathyroidism (H24), PICC (peripherally inserted central catheter) flush, Edema of right lower extremity, Redness, Pressure injury of right buttock, unstageable (H), and Cellulitis of right lower extremity were also pertinent to this visit.    Met with patient who was found upright sitting in wheelchair. She denies any chest pain,  "palpitations, shortness of breath, RASHID, lightheadedness, dizziness, or cough. Denies any abdominal discomfort. Denies N&V. Denies B&B concerns. Denies dysuria or frequency. Denies loose or constipation, reports stools have been looser now since being on aggressive BM management, these now are PRN. Requires maximum assistance for toileting cares and hygiene at this time. Slide board transfers. Appetite good. Sleeping well. Reports pain to wound areas about 7.5/10. Declines offer to restart dilaudid today. Had virtual visit with wound provider today, unknown of recommendations for wound management at this time. Increased edema to RLE with open wound present to back of right calf. She declines any trauma or hitting leg against something. PICC remains in place. Grey line was able to get blood return. No blood return on other ports. Poor compliance with removal of PICC at this time. PICC was placed 9/29/23. She wants to keep in place until future surgery needs, however no surgery is scheduled at this time. If she does discharge home prior to surgery date, then PICC will need to be removed at that time due to no need. She reports ongoing concerns with nursing staff. I have directed her concerns to management team today.     BP Readings from Last 3 Encounters:   11/22/23 135/80   11/20/23 125/74   11/17/23 128/85     Wt Readings from Last 5 Encounters:   11/22/23 100.8 kg (222 lb 3.2 oz)   11/20/23 100.5 kg (221 lb 9.6 oz)   11/17/23 104.3 kg (230 lb)   11/12/23 104.4 kg (230 lb 3.2 oz)   10/24/23 111.2 kg (245 lb 2.4 oz)     Allergies, and PMH/PSH reviewed in EPIC today.  REVIEW OF SYSTEMS:  4 point ROS including Respiratory, CV, GI and , other than that noted in the HPI,  is negative    Objective:   /80   Pulse 94   Temp 98.4  F (36.9  C)   Resp 18   Ht 1.676 m (5' 6\")   Wt 100.8 kg (222 lb 3.2 oz)   SpO2 99%   BMI 35.86 kg/m    GENERAL APPEARANCE:  Alert, in no distress, oriented, morbidly obese, " cooperative  ENT:  Mouth and posterior oropharynx normal, moist mucous membranes, normal hearing acuity  EYES:  EOM, conjunctivae, lids, pupils and irises normal  NECK:  No adenopathy,masses or thyromegaly  RESP:  respiratory effort and palpation of chest normal, lungs clear to auscultation , no respiratory distress  CV:  Palpation and auscultation of heart done , regular rate and rhythm, no murmur, rub, or gallop, peripheral edema 1+ in RLE  ABDOMEN:  normal bowel sounds, soft, nontender, no hepatosplenomegaly or other masses, no guarding or rebound  M/S:   Slide board transfers. Wheelchair bound. Assist with all needs, ADLS, and transfers  SKIN:  Inspection of skin and subcutaneous tissue baseline, see photo. Stump wound present--appears stable. New open area to back of left calf present with increased redness present. Edema to RLE. See photos below  NEURO:   Cranial nerves 2-12 are normal tested and grossly at patient's baseline, no purposeful movement in upper and lower extremities  PSYCH:  oriented X 3, normal insight, judgement and memory, affect and mood normal                                Most Recent 3 CBC's:  Recent Labs   Lab Test 11/17/23  1033 11/01/23  0613 10/21/23  0637   WBC 7.8 6.4 7.3   HGB 10.5* 9.0* 8.3*   MCV 95 96 98    268 234     Most Recent 3 BMP's:  Recent Labs   Lab Test 11/17/23  1033 11/15/23  0800 11/14/23  2046 11/01/23  0814 11/01/23  0613 10/21/23  0805 10/21/23  0637     --   --   --  140  --  142   POTASSIUM 4.3  --   --   --  4.4  --  4.2   CHLORIDE 101  --   --   --  100  --  102   CO2 29  --   --   --  29  --  32*   BUN 27.9*  --   --   --  18.0  --  20.3   CR 0.93  --   --   --  0.96*  --  1.02*   ANIONGAP 11  --   --   --  11  --  8   ZAKIA 10.9*  --   --   --  9.8  --  9.9   * 141* 224*   < > 149*   < > 158*    < > = values in this interval not displayed.     Most Recent 2 LFT's:  Recent Labs   Lab Test 11/17/23  1033 11/01/23  0613   AST 20 19   ALT 11 12    ALKPHOS 90 97   BILITOTAL 0.5 0.2     Most Recent 3 INR's:  Recent Labs   Lab Test 11/17/23  1033 11/15/23  0644 11/14/23  0606   INR 1.69* 2.45* 2.26*     Most Recent Cholesterol Panel:  Recent Labs   Lab Test 09/30/23  1620   CHOL 82   LDL 37   HDL 35*   TRIG 49     Most Recent Hemoglobin A1c:  Recent Labs   Lab Test 09/23/23  0533   A1C 7.0*     Most Recent Anemia Panel:  Recent Labs   Lab Test 11/17/23  1033   WBC 7.8   HGB 10.5*   HCT 34.4*   MCV 95          ASSESSMENT/PLAN:     (R53.81) Physical deconditioning  (primary encounter diagnosis)  (M62.81) Generalized muscle weakness  Comment: Acute on chronic. S/T below diagnosis. Per therapy they project LCD potentially 12/1. Still requiring max assistance for toileting cares along with wound care needs  Plan:   -Continue Physical therapy and Occupational therapy as directed  -SW to remain involved for safe discharge planning needs  -Suspect will need DME needs for discharge planning goals.      (E11.621,  L97.425) Diabetic ulcer of left heel associated with type 2 diabetes mellitus, with muscle involvement without evidence of necrosis (H)  (Z89.512) 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)  (Z98.890) Status post debridement  (M86.9) Osteomyelitis of left foot, unspecified type (H)  (F11.20) Opioid dependence, uncomplicated (H)  (G89.4) Chronic pain syndrome  (I83.019,  I83.029,  L97.919,  L97.929) Venous stasis ulcers of both lower extremities (H)  (I89.0) Lymphedema  Comment: Chronic. S/p debridement 9/26/23. S/p Left guillotine BKA on 10/7/23. S/p tibial/fibular resection and debridement of stump 10/17/23. Followed by vascular. Completed course of meropenem/vancomycin 10/8 for osteomyelitis. Vascular surgery planning to close in the future, monthly clinic visits for wound checks with closure at unspecified time in the future  Plan:   -Monitor pain complaints  -In house wound provider to follow for ongoing needs (had virtual visit  today 11/22/23)  -Continue methadone 10mg daily at 1500 and 5mg TID scheduled.   -Tylenol PRN  -Continue triple lumen PICC to RUE as directed. Added daily flush and PRN along with dressing weekly changes. Would recommend removing PICC at this time due to non use, however patient wants to keep due to pending future surgery needs. No surgery date confirmed. Would need to remove if TCU discharge.   -BMP, CBC, INR, TSH, and free T4 due 11/24/23  -Follow up with vascular as directed. Scheduled for 12/11/23 and 1/15/24  -Apply hemp lotion per her request to right heel dry skin BID--keep at bedside and staff to apply accordingly  -Continue current wound cares as directed;  *Wound Care - Left BKA : *Change daily* 1. Cleanse w/Vashe soaked gauze, including sreekanth wound skin, gentle pat dry. 2. Apply skin prep to sreekanth wound skin. 3. Soak Hydrofera Blue with saline and place over wound bed, cover with abd, and wrap with kerlix to secure     (E11.621,  L97.509,  Z79.4) Type 2 diabetes mellitus with foot ulcer, with long-term current use of insulin (H)  (E66.01) Obesity, Class III, BMI 40-49.9 (morbid obesity) (H)  Comment: Chronic. Last A1c 7% in sept 2023. Goal <9%.   Plan:   -Monitor Blood Glucose QID as directed  -Continue sliding scale insulin as directed TID with meals and HS  -Continue tresiba 12 units at HS  -Recommend hgb A1c due Dec 2023.   -BMP, CBC, INR, TSH, and free T4 due 11/24/23     (K59.01) Slow transit constipation  Comment: Acute on chronic. Stable  Plan:   -Monitor BM patterns  -Continue miralax 17gm BID PRN  -Continue senna S 2 tabs BID PRN  -Zofran PRN  -Bisacodyl daily PRN  -BMP, CBC, INR, TSH, and free T4 due 11/24/23     (I48.0) Paroxysmal atrial fibrillation (H)  (Z79.01) Long term (current) use of anticoagulants  (D68.32,  T45.515A) Warfarin-induced coagulopathy   (I10) Essential hypertension with goal blood pressure less than 140/90  (E78.2) Mixed hyperlipidemia  (N18.30) Stage 3 chronic kidney  disease, unspecified whether stage 3a or 3b CKD (H)  (I50.812) Chronic right-sided heart failure (H)  Comment: Chronic. Baseline Creatinine~ 1-1.2. Based on JNC-8 goals,  patients age of 78 year old, presence of diabetes or CKD, and goals of care goal BP is  <140/90 mm Hg. Patient is stable with current plan of care and routine assessment..Holding lisinopril per chart review per patient wishes. INR goal 2-3.INR 11/17/23-1.69  Plan:   -Monitor for worsening s/symptoms of concerns  -Monitor BP and HR as directed  -Continue asa 81mg daily  -Continue atorvastatin 40mg daily  -Continue coumadin 2.5mg on mon/thurs and 3mg AOD  -Monitor weights as directed. Baseline weight around 222-224#.   -Continue Torsemide 15mg daily. HOLD if SBP<100  -BMP, CBC, INR, TSH, and free T4 due 11/24/23     (R62.7) Failure to thrive in adult  (F41.9) Anxiety  (F33.1) Major depressive disorder, recurrent episode, moderate (H)  Comment: Chronic. S/T current health condition  Plan:   -Monitor mood and behaviors  -Monitor for worsening s/symptoms of concerns  -Dietician to remain involved  -ACP offered while on TCU and declined at this time  -Monitor for changes in mobility, eating and sleeping patterns  -BMP, CBC, INR, TSH, and free T4 due 11/24/23     (R60.0) Edema of right extremity  (L53.9) Redness  (L03.115) Cellulitis of right lower extremity  Comment: Acute. Noted increased redness to RLE with warmth and pain. Currently on coumadin. US negative for DVT risks. Now open area noted to back of calf. Due to increased redness with wound present will treat for suspected cellulitis. Unable to start prednisone given allergy risk. Suspect edema may also be contributing to wound concerns to RLE.   Plan:  -Start wound care to RLE for now:   *Cleanse with normal saline. Apply bactroban to open areas. Leave non open areas to air. Cover weeping wounds with mepilex. Perform daily and PRN  -Recommend patient brings in velcro wraps for use to assist with  edema control.   -Start cephalexin 500mg TID x 10 days for suspected cellulitis  -Unable to start prednisone due to allergy of N&V risks.  -Monitor for worsening s/symptoms of concerns  -BMP, CBC, INR, TSH, and free T4 due 11/24/23          (J44.9) Chronic obstructive pulmonary disease, unspecified COPD type (H)  (G47.33) BERLIN (obstructive sleep apnea)  Comment: Chronic. Non compliant with CPAP use.   Plan:   -Monitor respiratory status  -Would recommend CPAP restart if compliant  -BMP, CBC, INR, TSH, and free T4 due 11/24/23     (L30.9) Dermatitis  Comment: Acute on chronic. History of allergic reaction to bilateral hands per chart review. IMPROVED  Plan:   -Monitor for worsening s/symptoms of concerns  -Hydrocortisone 2.5% topically BID to hands  -BMP, CBC, INR, TSH, and free T4 due 11/24/23     (E21.0) Primary hyperparathyroidism (H24)  Comment: Acute. Noted on chart review. Last TSH level in 09/2022 was 1.73.   Plan:   -Not currently on any supplementation.   -Monitor for worsening s/symptoms of concerns  -BMP, CBC, INR, TSH, and free T4 due 11/24/23     (L89.320) Pressure injury of left buttock  Comment: Acute. History of reports of skin breakdown in the past to buttock region, however these areas have healed PTA. Now developed upper buttock pressure injuries. Suspect due to friction S/T slide board, however patient does not agree. She believes this is from stool being left on skin without proper hygiene clean up. I educated and advised to apply foam dressings which she refused. I also educated use of slide board which I suspect is cause, however she declines.   Plan:  -Monitor skin for further breakdown  -Continue therapies for appropriate slide board use. I did discuss with therapy today to monitor this with appropriate transfer needs.   -In house skin/wound provider to follow  -Would recommend mepilex to prevent skin breakdown, but she declines at this time.   -Monitor for worsening s/symptoms of  concerns  -BMP, CBC, INR, TSH, and free T4 due 11/24/23     Electronically signed by:  Dr. Haylie Rowe DNP, APRN, FNP-C, WCS-C, EDS-C               Sincerely,        Haylie Rowe, MOE CNP

## 2023-11-23 ENCOUNTER — LAB REQUISITION (OUTPATIENT)
Dept: LAB | Facility: CLINIC | Age: 78
End: 2023-11-23
Payer: COMMERCIAL

## 2023-11-23 DIAGNOSIS — I50.22 CHRONIC SYSTOLIC (CONGESTIVE) HEART FAILURE (H): ICD-10-CM

## 2023-11-23 DIAGNOSIS — I15.1 HYPERTENSION SECONDARY TO OTHER RENAL DISORDERS: ICD-10-CM

## 2023-11-23 DIAGNOSIS — E03.9 HYPOTHYROIDISM, UNSPECIFIED: ICD-10-CM

## 2023-11-23 DIAGNOSIS — I48.0 PAROXYSMAL ATRIAL FIBRILLATION (H): ICD-10-CM

## 2023-11-23 DIAGNOSIS — D64.9 ANEMIA, UNSPECIFIED: ICD-10-CM

## 2023-11-23 DIAGNOSIS — N18.4 CHRONIC KIDNEY DISEASE, STAGE 4 (SEVERE) (H): ICD-10-CM

## 2023-11-23 DIAGNOSIS — I15.2 HYPERTENSION SECONDARY TO ENDOCRINE DISORDERS: ICD-10-CM

## 2023-11-23 DIAGNOSIS — N18.30 CHRONIC KIDNEY DISEASE, STAGE 3 UNSPECIFIED (H): ICD-10-CM

## 2023-11-23 NOTE — PROGRESS NOTES
"[facility] GERIATRICS  [place] Medical Record Number:  [id number]  Place of Service where encounter took place: [facility])([facility]) [[id number]]    HPI:    Linda Loredo is a 78 year old  (1945), who is being seen today for an episodic care visit at the above location. Today's concern is INR/Coumadin management for NICO Rolle    ROS/Subjective:  Bleeding Signs/Symptoms:  None  Thromboembolic Signs/Symptoms:  None  Medication Changes:  Yes: started on keflex x 10 days for cellulitis 11/23/23  Dietary Changes:  No  Activity Changes: No  Bacterial/Viral Infection:  Yes: as above  Missed Coumadin Doses:  None  On ASA: Yes - 81 mg po q day  Other Concerns:  No    OBJECTIVE:  BP (!) 152/89   Pulse 97   Temp 98.6  F (37  C)   Resp 16   Ht 1.676 m (5' 6\")   Wt 100.7 kg (222 lb)   SpO2 100%   BMI 35.83 kg/m    Last INR: 1.69 on 11/17/23  INR Today:  3.50  Current Dose:  2.5mg on Mon/Thurs and 3mg AOD    ASSESSMENT:     Paroxysmal atrial fibrillation (H)  Long term (current) use of anticoagulants  Warfarin-induced coagulopathy   Encounter for therapeutic drug monitoring  Supratherapeutic INR for goal of 2-3    PLAN/ORDERS:   New Dose: Reduce down to 2mg on Mondays and 2.5mg AOD    Next INR: 1 week 12/1/23    Electronically signed by:  Dr. Haylie Rowe DNP, APRN, FNP-C, WCS-C, EDS-C      "

## 2023-11-24 ENCOUNTER — TRANSITIONAL CARE UNIT VISIT (OUTPATIENT)
Dept: GERIATRICS | Facility: CLINIC | Age: 78
End: 2023-11-24
Payer: COMMERCIAL

## 2023-11-24 VITALS
SYSTOLIC BLOOD PRESSURE: 152 MMHG | HEIGHT: 66 IN | RESPIRATION RATE: 16 BRPM | HEART RATE: 97 BPM | WEIGHT: 222 LBS | DIASTOLIC BLOOD PRESSURE: 89 MMHG | OXYGEN SATURATION: 100 % | BODY MASS INDEX: 35.68 KG/M2 | TEMPERATURE: 98.6 F

## 2023-11-24 DIAGNOSIS — T45.515A WARFARIN-INDUCED COAGULOPATHY (H): ICD-10-CM

## 2023-11-24 DIAGNOSIS — D68.32 WARFARIN-INDUCED COAGULOPATHY (H): ICD-10-CM

## 2023-11-24 DIAGNOSIS — Z51.81 ENCOUNTER FOR THERAPEUTIC DRUG MONITORING: ICD-10-CM

## 2023-11-24 DIAGNOSIS — Z79.01 LONG TERM (CURRENT) USE OF ANTICOAGULANTS: ICD-10-CM

## 2023-11-24 DIAGNOSIS — F11.20 OPIOID DEPENDENCE, UNCOMPLICATED (H): ICD-10-CM

## 2023-11-24 DIAGNOSIS — I48.0 PAROXYSMAL ATRIAL FIBRILLATION (H): Primary | ICD-10-CM

## 2023-11-24 LAB
ANION GAP SERPL CALCULATED.3IONS-SCNC: 11 MMOL/L (ref 7–15)
BASOPHILS # BLD AUTO: 0 10E3/UL (ref 0–0.2)
BASOPHILS NFR BLD AUTO: 1 %
BUN SERPL-MCNC: 35.2 MG/DL (ref 8–23)
CALCIUM SERPL-MCNC: 10.4 MG/DL (ref 8.8–10.2)
CHLORIDE SERPL-SCNC: 101 MMOL/L (ref 98–107)
CREAT SERPL-MCNC: 0.98 MG/DL (ref 0.51–0.95)
DEPRECATED HCO3 PLAS-SCNC: 28 MMOL/L (ref 22–29)
EGFRCR SERPLBLD CKD-EPI 2021: 59 ML/MIN/1.73M2
EOSINOPHIL # BLD AUTO: 0.3 10E3/UL (ref 0–0.7)
EOSINOPHIL NFR BLD AUTO: 4 %
ERYTHROCYTE [DISTWIDTH] IN BLOOD BY AUTOMATED COUNT: 13.9 % (ref 10–15)
GLUCOSE SERPL-MCNC: 228 MG/DL (ref 70–99)
HCT VFR BLD AUTO: 31.9 % (ref 35–47)
HGB BLD-MCNC: 9.9 G/DL (ref 11.7–15.7)
IMM GRANULOCYTES # BLD: 0 10E3/UL
IMM GRANULOCYTES NFR BLD: 0 %
INR PPP: 3.5 (ref 0.85–1.15)
LYMPHOCYTES # BLD AUTO: 1.2 10E3/UL (ref 0.8–5.3)
LYMPHOCYTES NFR BLD AUTO: 18 %
MCH RBC QN AUTO: 29 PG (ref 26.5–33)
MCHC RBC AUTO-ENTMCNC: 31 G/DL (ref 31.5–36.5)
MCV RBC AUTO: 94 FL (ref 78–100)
MONOCYTES # BLD AUTO: 0.5 10E3/UL (ref 0–1.3)
MONOCYTES NFR BLD AUTO: 7 %
NEUTROPHILS # BLD AUTO: 5 10E3/UL (ref 1.6–8.3)
NEUTROPHILS NFR BLD AUTO: 70 %
NRBC # BLD AUTO: 0 10E3/UL
NRBC BLD AUTO-RTO: 0 /100
PLATELET # BLD AUTO: 228 10E3/UL (ref 150–450)
POTASSIUM SERPL-SCNC: 4.5 MMOL/L (ref 3.4–5.3)
RBC # BLD AUTO: 3.41 10E6/UL (ref 3.8–5.2)
SODIUM SERPL-SCNC: 140 MMOL/L (ref 135–145)
T4 FREE SERPL-MCNC: 1.2 NG/DL (ref 0.9–1.7)
TSH SERPL DL<=0.005 MIU/L-ACNC: 3.46 UIU/ML (ref 0.3–4.2)
WBC # BLD AUTO: 7 10E3/UL (ref 4–11)

## 2023-11-24 PROCEDURE — 36415 COLL VENOUS BLD VENIPUNCTURE: CPT | Performed by: NURSE PRACTITIONER

## 2023-11-24 PROCEDURE — 84439 ASSAY OF FREE THYROXINE: CPT | Performed by: NURSE PRACTITIONER

## 2023-11-24 PROCEDURE — 82310 ASSAY OF CALCIUM: CPT | Performed by: NURSE PRACTITIONER

## 2023-11-24 PROCEDURE — 85610 PROTHROMBIN TIME: CPT | Performed by: NURSE PRACTITIONER

## 2023-11-24 PROCEDURE — 84443 ASSAY THYROID STIM HORMONE: CPT | Performed by: NURSE PRACTITIONER

## 2023-11-24 PROCEDURE — 99308 SBSQ NF CARE LOW MDM 20: CPT | Performed by: NURSE PRACTITIONER

## 2023-11-24 PROCEDURE — 85025 COMPLETE CBC W/AUTO DIFF WBC: CPT | Performed by: NURSE PRACTITIONER

## 2023-11-24 NOTE — LETTER
"    11/24/2023        RE: Linda Loredo  [address]  [address]  [facility] [address]        [facility]  [place] Medical Record Number:  [id number]  Place of Service where encounter took place: [facility] ([facility])([facility]) [[id number]]    HPI:    Linda Loredo is a 78 year old  (1945), who is being seen today for an episodic care visit at the above location. Today's concern is INR/Coumadin management for ELIE. Fib    ROS/Subjective:  Bleeding Signs/Symptoms:  None  Thromboembolic Signs/Symptoms:  None  Medication Changes:  Yes: started on keflex x 10 days for cellulitis 11/23/23  Dietary Changes:  No  Activity Changes: No  Bacterial/Viral Infection:  Yes: as above  Missed Coumadin Doses:  None  On ASA: Yes - 81 mg po q day  Other Concerns:  No    OBJECTIVE:  BP (!) 152/89   Pulse 97   Temp 98.6  F (37  C)   Resp 16   Ht 1.676 m (5' 6\")   Wt 100.7 kg (222 lb)   SpO2 100%   BMI 35.83 kg/m    Last INR: 1.69 on 11/17/23  INR Today:  3.50  Current Dose:  2.5mg on Mon/Thurs and 3mg AOD    ASSESSMENT:     Paroxysmal atrial fibrillation (H)  Long term (current) use of anticoagulants  Warfarin-induced coagulopathy   Encounter for therapeutic drug monitoring  Supratherapeutic INR for goal of 2-3    PLAN/ORDERS:   New Dose: Reduce down to 2mg on Mondays and 2.5mg AOD    Next INR: 1 week 12/1/23    Electronically signed by:  Dr. Haylie Rowe DNP, APRN, FNP-C, WCS-C, EDS-C          Sincerely,        Haylie Rowe, MOE CNP      "

## 2023-11-26 RX ORDER — METHADONE HYDROCHLORIDE 5 MG/1
5 TABLET ORAL 3 TIMES DAILY
Qty: 90 TABLET | Refills: 0 | Status: SHIPPED | OUTPATIENT
Start: 2023-11-26 | End: 2023-12-04

## 2023-11-27 ENCOUNTER — TRANSITIONAL CARE UNIT VISIT (OUTPATIENT)
Dept: GERIATRICS | Facility: CLINIC | Age: 78
End: 2023-11-27
Payer: COMMERCIAL

## 2023-11-27 ENCOUNTER — TELEPHONE (OUTPATIENT)
Dept: GERIATRICS | Facility: CLINIC | Age: 78
End: 2023-11-27

## 2023-11-27 VITALS
TEMPERATURE: 97.6 F | BODY MASS INDEX: 36.38 KG/M2 | DIASTOLIC BLOOD PRESSURE: 77 MMHG | WEIGHT: 226.4 LBS | HEIGHT: 66 IN | RESPIRATION RATE: 18 BRPM | SYSTOLIC BLOOD PRESSURE: 121 MMHG | OXYGEN SATURATION: 98 % | HEART RATE: 88 BPM

## 2023-11-27 DIAGNOSIS — F33.1 MAJOR DEPRESSIVE DISORDER, RECURRENT EPISODE, MODERATE (H): ICD-10-CM

## 2023-11-27 DIAGNOSIS — R53.81 PHYSICAL DECONDITIONING: ICD-10-CM

## 2023-11-27 DIAGNOSIS — F11.20 OPIOID DEPENDENCE, UNCOMPLICATED (H): ICD-10-CM

## 2023-11-27 DIAGNOSIS — Z98.890 STATUS POST DEBRIDEMENT: ICD-10-CM

## 2023-11-27 DIAGNOSIS — L97.929 VENOUS STASIS ULCERS OF BOTH LOWER EXTREMITIES (H): ICD-10-CM

## 2023-11-27 DIAGNOSIS — E11.621 TYPE 2 DIABETES MELLITUS WITH FOOT ULCER, WITH LONG-TERM CURRENT USE OF INSULIN (H): ICD-10-CM

## 2023-11-27 DIAGNOSIS — R62.7 FAILURE TO THRIVE IN ADULT: ICD-10-CM

## 2023-11-27 DIAGNOSIS — Z45.2 PICC (PERIPHERALLY INSERTED CENTRAL CATHETER) FLUSH: ICD-10-CM

## 2023-11-27 DIAGNOSIS — N18.30 STAGE 3 CHRONIC KIDNEY DISEASE, UNSPECIFIED WHETHER STAGE 3A OR 3B CKD (H): ICD-10-CM

## 2023-11-27 DIAGNOSIS — J44.9 CHRONIC OBSTRUCTIVE PULMONARY DISEASE, UNSPECIFIED COPD TYPE (H): ICD-10-CM

## 2023-11-27 DIAGNOSIS — E78.2 MIXED HYPERLIPIDEMIA: ICD-10-CM

## 2023-11-27 DIAGNOSIS — I83.029 VENOUS STASIS ULCERS OF BOTH LOWER EXTREMITIES (H): ICD-10-CM

## 2023-11-27 DIAGNOSIS — L97.509 TYPE 2 DIABETES MELLITUS WITH FOOT ULCER, WITH LONG-TERM CURRENT USE OF INSULIN (H): ICD-10-CM

## 2023-11-27 DIAGNOSIS — Z79.01 LONG TERM (CURRENT) USE OF ANTICOAGULANTS: ICD-10-CM

## 2023-11-27 DIAGNOSIS — R19.7 DIARRHEA, UNSPECIFIED TYPE: ICD-10-CM

## 2023-11-27 DIAGNOSIS — G47.33 OSA (OBSTRUCTIVE SLEEP APNEA): ICD-10-CM

## 2023-11-27 DIAGNOSIS — M62.81 GENERALIZED MUSCLE WEAKNESS: ICD-10-CM

## 2023-11-27 DIAGNOSIS — Z89.512 STATUS POST BELOW-KNEE AMPUTATION OF LEFT LOWER EXTREMITY (H): ICD-10-CM

## 2023-11-27 DIAGNOSIS — I48.0 PAROXYSMAL ATRIAL FIBRILLATION (H): Primary | ICD-10-CM

## 2023-11-27 DIAGNOSIS — E21.0 PRIMARY HYPERPARATHYROIDISM (H): ICD-10-CM

## 2023-11-27 DIAGNOSIS — I50.812 CHRONIC RIGHT-SIDED HEART FAILURE (H): ICD-10-CM

## 2023-11-27 DIAGNOSIS — I83.019 VENOUS STASIS ULCERS OF BOTH LOWER EXTREMITIES (H): ICD-10-CM

## 2023-11-27 DIAGNOSIS — F41.9 ANXIETY: ICD-10-CM

## 2023-11-27 DIAGNOSIS — T45.515A WARFARIN-INDUCED COAGULOPATHY (H): ICD-10-CM

## 2023-11-27 DIAGNOSIS — Z79.4 TYPE 2 DIABETES MELLITUS WITH FOOT ULCER, WITH LONG-TERM CURRENT USE OF INSULIN (H): ICD-10-CM

## 2023-11-27 DIAGNOSIS — L30.9 DERMATITIS: ICD-10-CM

## 2023-11-27 DIAGNOSIS — R60.0 EDEMA OF RIGHT LOWER EXTREMITY: ICD-10-CM

## 2023-11-27 DIAGNOSIS — I10 ESSENTIAL HYPERTENSION WITH GOAL BLOOD PRESSURE LESS THAN 140/90: ICD-10-CM

## 2023-11-27 DIAGNOSIS — M86.9 OSTEOMYELITIS OF LEFT FOOT, UNSPECIFIED TYPE (H): ICD-10-CM

## 2023-11-27 DIAGNOSIS — K59.01 SLOW TRANSIT CONSTIPATION: ICD-10-CM

## 2023-11-27 DIAGNOSIS — I89.0 LYMPHEDEMA: ICD-10-CM

## 2023-11-27 DIAGNOSIS — E66.01 OBESITY, CLASS III, BMI 40-49.9 (MORBID OBESITY) (H): ICD-10-CM

## 2023-11-27 DIAGNOSIS — L98.9 SKIN LESION: ICD-10-CM

## 2023-11-27 DIAGNOSIS — L97.425 DIABETIC ULCER OF LEFT HEEL ASSOCIATED WITH TYPE 2 DIABETES MELLITUS, WITH MUSCLE INVOLVEMENT WITHOUT EVIDENCE OF NECROSIS (H): ICD-10-CM

## 2023-11-27 DIAGNOSIS — D68.32 WARFARIN-INDUCED COAGULOPATHY (H): ICD-10-CM

## 2023-11-27 DIAGNOSIS — G89.4 CHRONIC PAIN SYNDROME: ICD-10-CM

## 2023-11-27 DIAGNOSIS — E11.621 DIABETIC ULCER OF LEFT HEEL ASSOCIATED WITH TYPE 2 DIABETES MELLITUS, WITH MUSCLE INVOLVEMENT WITHOUT EVIDENCE OF NECROSIS (H): ICD-10-CM

## 2023-11-27 DIAGNOSIS — L97.919 VENOUS STASIS ULCERS OF BOTH LOWER EXTREMITIES (H): ICD-10-CM

## 2023-11-27 PROCEDURE — 99309 SBSQ NF CARE MODERATE MDM 30: CPT | Performed by: NURSE PRACTITIONER

## 2023-11-27 RX ORDER — METHADONE HYDROCHLORIDE 10 MG/1
10 TABLET ORAL DAILY
Qty: 30 TABLET | Refills: 0 | Status: SHIPPED | OUTPATIENT
Start: 2023-11-27 | End: 2023-12-04

## 2023-11-27 RX ORDER — HYDROCORTISONE 2.5 %
CREAM (GRAM) TOPICAL 2 TIMES DAILY
Qty: 30 G | Refills: 11 | Status: SHIPPED | OUTPATIENT
Start: 2023-11-27 | End: 2024-01-03

## 2023-11-27 RX ORDER — DIPHENHYDRAMINE HCL 25 MG
25 TABLET ORAL EVERY 6 HOURS PRN
Qty: 60 TABLET | Refills: 3 | Status: SHIPPED | OUTPATIENT
Start: 2023-11-27 | End: 2024-01-26

## 2023-11-27 RX ORDER — LOPERAMIDE HYDROCHLORIDE 2 MG/1
2 TABLET ORAL 4 TIMES DAILY PRN
Qty: 60 TABLET | Refills: 1 | Status: SHIPPED | OUTPATIENT
Start: 2023-11-27 | End: 2024-01-08

## 2023-11-27 NOTE — LETTER
11/27/2023        RE: Linda Loredo  [address]  [address]  [facility] [id number]        [facility] GERIATRICS    Chief Complaint   Patient presents with     RECHECK     HPI:  Linda Loredo is a 78 year old  (1945), who is being seen today for an episodic care visit at: [facility] ([facility])([facility]) [[id number]]. Today's concern is: The primary encounter diagnosis was Paroxysmal atrial fibrillation (H). Diagnoses of Warfarin-induced coagulopathy , Long term (current) use of anticoagulants, Physical deconditioning, Generalized muscle weakness, Diabetic ulcer of left heel associated with type 2 diabetes mellitus, with muscle involvement without evidence of necrosis (H), 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), Status post debridement, Osteomyelitis of left foot, unspecified type (H), Opioid dependence, uncomplicated (H), Chronic pain syndrome, Venous stasis ulcers of both lower extremities (H), Lymphedema, Type 2 diabetes mellitus with foot ulcer, with long-term current use of insulin (H), Obesity, Class III, BMI 40-49.9 (morbid obesity) (H), Slow transit constipation, Essential hypertension with goal blood pressure less than 140/90, Mixed hyperlipidemia, Stage 3 chronic kidney disease, unspecified whether stage 3a or 3b CKD (H), Chronic right-sided heart failure (H), Failure to thrive in adult, Anxiety, Major depressive disorder, recurrent episode, moderate (H), Chronic obstructive pulmonary disease, unspecified COPD type (H), BERLIN (obstructive sleep apnea), Dermatitis, Skin lesion, Primary hyperparathyroidism (H24), PICC (peripherally inserted central catheter) flush, and Edema of right lower extremity were also pertinent to this visit.    Met with patient who was found sitting upright in wheelchair today. She reports staff verbalizing over the weekend that her wound cares were changed to every other day, which was not ordered by  "this provider as all changes should be discussed with approval by patient. Unknown what occurred to cause this concerns. I did discuss with management who is looking into this at this time. Now developed a very light rash to arms, mostly noticeable to left forearm. No rash noted to back or Right arm by provider today. Increased reports of itch complaints. Suspect may be related to recent antibiotic use. Known history of allergy reactions in past. Might also be related to linen soaps, etc? She denies any chest pain, palpitations, shortness of breath, RASHID, lightheadedness, dizziness, or cough. Denies any abdominal discomfort. Denies N&V. Denies dysuria or frequency. She reports stools are on softer side, again this is suspected due to antibiotic use adverse reaction as well. Appetite good. Sleeping well. Pain stable with current regimen. Wound appearance today appears to be stable. No evidence of infection noted.     BP Readings from Last 3 Encounters:   11/27/23 121/77   11/24/23 (!) 152/89   11/22/23 135/80     Wt Readings from Last 5 Encounters:   11/27/23 102.7 kg (226 lb 6.4 oz)   11/24/23 100.7 kg (222 lb)   11/22/23 100.8 kg (222 lb 3.2 oz)   11/20/23 100.5 kg (221 lb 9.6 oz)   11/17/23 104.3 kg (230 lb)     Allergies, and PMH/PSH reviewed in EPIC today.  REVIEW OF SYSTEMS:  4 point ROS including Respiratory, CV, GI and , other than that noted in the HPI,  is negative    Objective:   /77   Pulse 88   Temp 97.6  F (36.4  C)   Resp 18   Ht 1.676 m (5' 6\")   Wt 102.7 kg (226 lb 6.4 oz)   SpO2 98%   BMI 36.54 kg/m    GENERAL APPEARANCE:  Alert, in no distress, oriented, cooperative  ENT:  Mouth and posterior oropharynx normal, moist mucous membranes, normal hearing acuity  EYES:  EOM, conjunctivae, lids, pupils and irises normal  NECK:  No adenopathy,masses or thyromegaly  RESP:  respiratory effort and palpation of chest normal, lungs clear to auscultation , no respiratory distress  CV:  Palpation " and auscultation of heart done , regular rate and rhythm, no murmur, rub, or gallop, peripheral edema 1+ in RLE  ABDOMEN:  normal bowel sounds, soft, nontender, no hepatosplenomegaly or other masses, no guarding or rebound  M/S:   Slide board transfers. Wheelchair bound. Staff to assist as directed  SKIN:  see photos below. Left stump area present. Mild drainage present. No signs of infection. Minimal redness present to RLE. Did not observe buttock wounds today.   NEURO:   Cranial nerves 2-12 are normal tested and grossly at patient's baseline, no purposeful movement in upper and lower extremities  PSYCH:  oriented X 3, normal insight, judgement and memory, affect and mood normal                    Most Recent 3 CBC's:  Recent Labs   Lab Test 11/24/23  1012 11/17/23  1033 11/01/23  0613   WBC 7.0 7.8 6.4   HGB 9.9* 10.5* 9.0*   MCV 94 95 96    218 268     Most Recent 3 BMP's:  Recent Labs   Lab Test 11/24/23  1012 11/17/23  1033 11/15/23  0800 11/01/23  0814 11/01/23  0613    141  --   --  140   POTASSIUM 4.5 4.3  --   --  4.4   CHLORIDE 101 101  --   --  100   CO2 28 29  --   --  29   BUN 35.2* 27.9*  --   --  18.0   CR 0.98* 0.93  --   --  0.96*   ANIONGAP 11 11  --   --  11   ZAKIA 10.4* 10.9*  --   --  9.8   * 205* 141*   < > 149*    < > = values in this interval not displayed.     Most Recent 2 LFT's:  Recent Labs   Lab Test 11/17/23  1033 11/01/23  0613   AST 20 19   ALT 11 12   ALKPHOS 90 97   BILITOTAL 0.5 0.2     Most Recent 3 INR's:  Recent Labs   Lab Test 11/24/23  1012 11/17/23  1033 11/15/23  0644   INR 3.50* 1.69* 2.45*     Most Recent TSH and T4:  Recent Labs   Lab Test 11/24/23  1012   TSH 3.46   T4 1.20     Most Recent Anemia Panel:  Recent Labs   Lab Test 11/24/23  1012   WBC 7.0   HGB 9.9*   HCT 31.9*   MCV 94          ASSESSMENT/PLAN:     (R53.81) Physical deconditioning  (primary encounter diagnosis)  (M62.81) Generalized muscle weakness  Comment: Acute on chronic. S/T  below diagnosis. Per therapy they project LCD potentially 12/1. Still requiring max assistance for toileting cares along with wound care needs  Plan:   -Continue Physical therapy and Occupational therapy as directed  -SW to remain involved for safe discharge planning needs  -Suspect will need DME needs for discharge planning goals.      (E11.621,  L97.425) Diabetic ulcer of left heel associated with type 2 diabetes mellitus, with muscle involvement without evidence of necrosis (H)  (Z89.512) 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)  (Z98.890) Status post debridement  (M86.9) Osteomyelitis of left foot, unspecified type (H)  (F11.20) Opioid dependence, uncomplicated (H)  (G89.4) Chronic pain syndrome  (I83.019,  I83.029,  L97.919,  L97.929) Venous stasis ulcers of both lower extremities (H)  (I89.0) Lymphedema  Comment: Chronic. S/p debridement 9/26/23. S/p Left guillotine BKA on 10/7/23. S/p tibial/fibular resection and debridement of stump 10/17/23. Followed by vascular. Completed course of meropenem/vancomycin 10/8 for osteomyelitis. Vascular surgery planning to close in the future, monthly clinic visits for wound checks with closure at unspecified time in the future  Plan:   -Monitor pain complaints  -In house wound provider to follow for ongoing needs (had virtual visit done11/22/23--do not see any records of this yet)  -Continue methadone 10mg daily at 1500 and 5mg TID scheduled.   -Tylenol PRN  -Continue triple lumen PICC to RUE as directed. Added daily flush and PRN along with dressing weekly changes. Would recommend removing PICC at this time due to non use, however patient wants to keep due to pending future surgery needs. No surgery date confirmed. Would need to remove if TCU discharge.   -BMP, CBC, and INR due 12/1/23  -Follow up with vascular as directed. Scheduled for 12/11/23 via telephone and 1/15/24   -Apply hemp lotion per her request to right heel dry skin BID--keep at bedside  and staff to apply accordingly  -Continue current wound cares as directed;  *Wound Care - Left BKA : *Change daily* 1. Cleanse w/Vashe soaked gauze, including sreekanth wound skin, gentle pat dry. 2. Apply skin prep to sreekatnh wound skin. 3. Soak Hydrofera Blue with saline and place over wound bed, cover with abd, and wrap with kerlix to secure     (E11.621,  L97.509,  Z79.4) Type 2 diabetes mellitus with foot ulcer, with long-term current use of insulin (H)  (E66.01) Obesity, Class III, BMI 40-49.9 (morbid obesity) (H)  Comment: Chronic. Last A1c 7% in sept 2023. Goal <9%.   Plan:   -Monitor Blood Glucose QID as directed  -Continue sliding scale insulin as directed TID with meals and HS  -Continue tresiba 12 units at HS  -Recommend hgb A1c due Dec 2023.   -BMP, CBC, and INR due 12/1/23         (K59.01) Slow transit constipation  Comment: Acute on chronic. Increased loose stools present, suspect due to antibiotic course.   Plan:   -Monitor BM patterns  -Change miralax to 17gm BID PRN  -Imodium 2mg QID PRN added for diarrheal concerns. Use if needed  -Continue senna S 2 tabs BID PRN  -Zofran PRN  -Bisacodyl daily PRN  -BMP, CBC, and INR due 12/1/23     (I48.0) Paroxysmal atrial fibrillation (H)  (Z79.01) Long term (current) use of anticoagulants  (D68.32,  T45.515A) Warfarin-induced coagulopathy   (I10) Essential hypertension with goal blood pressure less than 140/90  (E78.2) Mixed hyperlipidemia  (N18.30) Stage 3 chronic kidney disease, unspecified whether stage 3a or 3b CKD (H)  (I50.812) Chronic right-sided heart failure (H)  Comment: Chronic. Baseline Creatinine~ 1-1.2. Based on JNC-8 goals,  patients age of 78 year old, presence of diabetes or CKD, and goals of care goal BP is  <140/90 mm Hg. Patient is stable with current plan of care and routine assessment..Holding lisinopril per chart review per patient wishes. INR goal 2-3.INR 11/24/23 was 3.50  Plan:   -Monitor for worsening s/symptoms of concerns  -Monitor BP and  HR as directed  -Continue asa 81mg daily  -Continue atorvastatin 40mg daily  -Continue coumadin 2mg Mondays and 2.5mg AOD  -Monitor weights as directed. Baseline weight around 222-224#.   -Continue Torsemide 15mg daily. HOLD if SBP<100  -BMP, CBC, and INR due 12/1/23         (R62.7) Failure to thrive in adult  (F41.9) Anxiety  (F33.1) Major depressive disorder, recurrent episode, moderate (H)  Comment: Chronic. S/T current health condition  Plan:   -Monitor mood and behaviors  -Monitor for worsening s/symptoms of concerns  -Dietician to remain involved  -ACP offered while on TCU and declined at this time  -Monitor for changes in mobility, eating and sleeping patterns  -BMP, CBC, and INR due 12/1/23     (R60.0) Edema of right extremity  (L53.9) Redness  (L03.115) Cellulitis of right lower extremity  Comment: Acute. Noted increased redness to RLE with warmth and pain soon after admission. Currently on coumadin. US negative for DVT risks. Now open area noted to back of calf. Due to increased redness with wound present is currently being treated for suspected cellulitis. Unable to start prednisone given allergy risk. Suspect edema may also be contributing to wound concerns to RLE.   Plan:  -Lymphedema therapy to assess and assist with edema control  -Continue wound care to RLE for now:   *Cleanse with normal saline. Apply bactroban to open areas. Leave non open areas to air. Cover weeping wounds with mepilex. Perform daily and PRN  -Recommend patient brings in velcro wraps for use to assist with edema control.   -Continue cephalexin 500mg TID x 10 days for suspected cellulitis--due to stop 12/2 PM  -Unable to start prednisone due to allergy of N&V risks.  -Monitor for worsening s/symptoms of concerns  -Start benadryl every 6 hours PRN  -Change hydrocortisone cream to apply to back, arms and right leg redness BID and BID PRN  -BMP, CBC, and INR due 12/1/23          (J44.9) Chronic obstructive pulmonary disease,  unspecified COPD type (H)  (G47.33) BERLIN (obstructive sleep apnea)  Comment: Chronic. Non compliant with CPAP use.   Plan:   -Monitor respiratory status  -Would recommend CPAP restart if compliant  -BMP, CBC, and INR due 12/1/23     (L30.9) Dermatitis  Comment: Acute on chronic. History of allergic reaction to bilateral hands per chart review.   Plan:   -Monitor for worsening s/symptoms of concerns  -Benadryl PRN  -Hydrocortisone 2.5% topically BID to back, arms and redness to RLE BID and BID PRN  -BMP, CBC, and INR due 12/1/23     (E21.0) Primary hyperparathyroidism (H24)  Comment: Acute. Noted on chart review. Last TSH level stable of 3.46   Plan:   -Not currently on any supplementation.   -Monitor for worsening s/symptoms of concerns  -BMP, CBC, and INR due 12/1/23     (L89.320) Pressure injury of left buttock  Comment: Acute. History of reports of skin breakdown in the past to buttock region, however these areas have healed PTA. Now developed upper buttock pressure injuries. Suspect due to friction S/T slide board, however patient does not agree. She believes this is from stool being left on skin without proper hygiene clean up. I also educated use of slide board which I suspect is cause, however she declines.   Plan:  -Monitor skin for further breakdown  -Continue therapies for appropriate slide board use. I did discuss with therapy today to monitor this with appropriate transfer needs.   -In house skin/wound provider to follow  -Continue wound treatment course as directed:  *Wound care to buttock open wounds. Cleanse with normal saline. Pat dry, use barrier skin to surrounding skin, then cover w/sacral mepilex.  -Monitor for worsening s/symptoms of concerns  -BMP, CBC, and INR due 12/1/23     Electronically signed by:  Dr. Haylie Rowe DNP, APRN, FNP-C, WCS-C, EDS-C             Sincerely,        Haylie Rowe, MOE CNP

## 2023-11-27 NOTE — PROGRESS NOTES
RN called to report patient has a new rash on hands and body. Started on antibiotic 11/24/23. No respiratory distress.     Plan:  Benadryl 25 mg once. Update PCP in AM.       Natalia Connor CNP

## 2023-11-28 NOTE — PROGRESS NOTES
[facility]  INITIAL VISIT NOTE  November 29, 2023    PRIMARY CARE PROVIDER AND CLINIC:  [facility] [address] / [facility] [address]    Chief Complaint   Patient presents with     Hospital F/U       HPI:    Linda Loredo is a 78 year old  (1945) female who was seen at [facility] on November 29, 2023 for an initial visit.     Medical history is notable for DM type II, diabetic neuropathy, left heel diabetic ulcer, hypertension, dyslipidemia, PAD, atrial fibrillation, CKD, COPD, asthma, MRSA infection, UTI, herpes zoster, primary hyperparathyroidism, umbilical hernia, chronic pain, depression, anxiety, osteopenia, severe obesity, and BRELIN.    Summary of hospital course:  Patient was initially hospitalized at [facility] from September 29 through October 24, 2023 for left heel diabetic ulcer and left calcaneal osteomyelitis for which she underwent left BKA on October 7, 2023 and sharp excisional debridement and tibial/fibular resection on October 14, 2023.  Tissue culture was polymicrobial.  She was treated with vancomycin and meropenem inpatient.  Hospital course was complicated by ABLA with a ralf hemoglobin 6.6 for which she received 1 unit of PRBC on October 10.  She was discharged to [facility] where she stayed from October 24 through November 15, 2023.    Patient is now admitted to this facility for ongoing medical management, nursing care, and rehab.     Of note, history was obtained from patient, facility RN, and extensive review of the chart.    Today's visit:  Patient was seen in her room, while sitting in a wheelchair.  She appears somewhat frail but comfortable.  She has been started on cephalexin for right lower extremity cellulitis.  She has sustained a wound to her right calf after hitting the bed in TCU.  She denies fever, chills, chest pain,  palpitation, dyspnea, nausea, vomiting, abdominal pain, or urinary symptoms.  She had a bowel movement 3 days ago.      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, 2023 and sharp excisional debridement and tibial/fibular resection October 14, 2023  Lower extremity venous stasis  DM type II  Diabetic polyneuropathy  Hypertension  Dyslipidemia  PAD (diffuse atherosclerotic disease per lower extremity arterial Doppler in September 2023)  Atrial fibrillation  CKD stage II-IIIa, baseline creatinine 0.9-1.1  COPD  Mild intermittent asthma  MRSA nares  UTI  Herpes zoster  Primary hyperparathyroidism  Umbilical hernia  Chronic pain  Depression  Anxiety  Osteopenia  Severe obesity, BMI 36  BERLIN, noncompliant with CPAP for several years    Past Medical History:   Diagnosis Date     Depressive disorder, not elsewhere classified      Herpes zoster without mention of complication      Hypercalcemia 2/24/2007     Mild intermittent asthma      Other urinary incontinence      Type II or unspecified type diabetes mellitus with renal manifestations, uncontrolled(250.42) (H)      Type II or unspecified type diabetes mellitus without mention of complication, not stated as uncontrolled      Unspecified essential hypertension        PAST SURGICAL HISTORY:   Past Surgical History:   Procedure Laterality Date     AMPUTATE LEG BELOW KNEE Left 10/7/2023    Procedure: LEFT GUILLOTINE BELOW KNEE AMPUTATION.;  Surgeon: Lan Otto MD;  Location:  OR     AMPUTATE LEG BELOW KNEE Left 10/14/2023    Procedure: debridement of left below the knee amputation;  Surgeon: Lan Otto MD;  Location:  OR     CHOLECYSTECTOMY       INCISION AND DRAINAGE FOOT, COMBINED Left 9/26/2023    Procedure: Surgical debridement of all nonviable skin and soft tissue and bone left foot;  Surgeon: Nolberto Thorne  LARRY Ibanez;  Location: [facility] OR     IR LOWER EXTREMITY ANGIOGRAM LEFT  10/3/2023     ligation of fallopian tube       OPEN REDUCTION INTERNAL FIXATION ANKLE  10/13/11    left     pinning of left 2nd toe          FAMILY HISTORY:   Family History   Problem Relation Age of Onset     Hypertension Mother      Heart Disease Father         heart attack and cardiomegaly     Diabetes Sister         type 2     Hypertension Sister      Respiratory Sister      Psychotic Disorder Sister         bipolar     Cancer Maternal Grandmother         throat     Cancer Paternal Grandmother         gastric       SOCIAL HISTORY:  Social History     Tobacco Use     Smoking status: Never     Smokeless tobacco: Never   Substance Use Topics     Alcohol use: No       MEDICATIONS:  Current Outpatient Medications   Medication Sig Dispense Refill     acetaminophen (TYLENOL) 325 MG tablet Take 2 tablets (650 mg) by mouth every 6 hours as needed for mild pain or other (and adjunct with moderate or severe pain or per patient request)       alteplase (CATHFLO ACTIVASE) 2 MG injection Triple Lumen PICC to RUE. May flush each lumen of 2mg each for blood return needs 3 mL 1     aspirin 81 MG EC tablet Take 1 tablet (81 mg) by mouth daily       atorvastatin (LIPITOR) 40 MG tablet Take 1 tablet (40 mg) by mouth every evening       bisacodyl (DULCOLAX) 10 MG suppository Place 1 suppository (10 mg) rectally daily as needed for constipation 30 suppository 0     cephALEXin (KEFLEX) 500 MG capsule Take 1 capsule (500 mg) by mouth 3 times daily for 10 days 30 capsule 0     diphenhydrAMINE (BENADRYL) 25 MG tablet Take 1 tablet (25 mg) by mouth every 6 hours as needed for itching or allergies 60 tablet 3     hydrocortisone 2.5 % cream Apply topically 2 times daily Apply to back, arms and to redness to RLE BID and BID PRN 30 g 11     insulin aspart (NOVOLOG PEN) 100 UNIT/ML pen Inject 1-7 Units Subcutaneous 3 times daily (before meals) 15 mL 0     insulin aspart  (NOVOLOG PEN) 100 UNIT/ML pen Inject 1-5 Units Subcutaneous at bedtime 15 mL 0     insulin degludec (TRESIBA) 200 UNIT/ML pen Inject 12 Units Subcutaneous at bedtime Around 11-11:30 pm       loperamide (IMODIUM A-D) 2 MG tablet Take 1 tablet (2 mg) by mouth 4 times daily as needed for diarrhea 60 tablet 1     methadone (DOLOPHINE) 10 MG tablet Take 1 tablet (10 mg) by mouth daily Daily at 1500 30 tablet 0     methadone (DOLOPHINE) 5 MG tablet Take 1 tablet (5 mg) by mouth 3 times daily 90 tablet 0     miconazole (MICATIN) 2 % external powder Apply topically 2 times daily       multivitamin w/minerals (THERA-VIT-M) tablet Take 1 tablet by mouth daily       mupirocin (BACTROBAN) 2 % external ointment Apply topically 2 times daily Apply to RLE lesion BID 60 g 11     ondansetron (ZOFRAN ODT) 4 MG ODT tab Take 1 tablet (4 mg) by mouth every 6 hours as needed for nausea or vomiting 21 tablet 0     polyethylene glycol (MIRALAX) 17 GM/Dose powder Take 17 g by mouth 2 times daily as needed for constipation       senna-docusate (SENOKOT-S/PERICOLACE) 8.6-50 MG tablet Take 2 tablets by mouth 2 times daily as needed for constipation       sodium chloride, PF, (SODIUM CHLORIDE FLUSH) 0.9% PF flush Flush each PICC lumen (x3) with 10mL 0.9% sodium chloride daily and PRN before and after use. 1000 mL 11     torsemide (DEMADEX) 5 MG tablet Take 3 tablets (15 mg) by mouth daily HOLD if SBP<100       vitamin C (ASCORBIC ACID) 500 MG tablet Take 500 mg by mouth daily       Warfarin Therapy Reminder Per INR         MED REC REQUIRED  Post Medication Reconciliation Status: medication reconcilation previously completed during another office visit        ALLERGIES:  Allergies   Allergen Reactions     Prednisone Nausea and Vomiting     Morphine Nausea and Vomiting     Morphine [Fumaric Acid] Nausea and Vomiting       ROS:  10 point ROS were negative other than the symptoms noted above in the HPI.    PHYSICAL EXAM:  Vital signs were reviewed  "in the chart.  Vital Signs: BP (!) 152/74   Pulse 76   Temp 97.9  F (36.6  C)   Resp 18   Ht 1.676 m (5' 6\")   Wt 103.4 kg (227 lb 14.4 oz)   SpO2 98%   BMI 36.78 kg/m    General: Somewhat frail appearing but comfortable and in no acute distress  HEENT: Conjunctival pallor, no scleral icterus or injection, moist oral mucosa  Cardiovascular: Normal S1, S2, irregularly irregular rhythm  Respiratory: Lungs clear to auscultation bilaterally  GI: Abdomen soft, non-tender, non-distended, +BS  Extremities: 3-4+ right lower extremity edema,, s/p L BKA   Neuro: CX II-XII grossly intact; ROM in all four extremities grossly intact  Psych: Alert and oriented x3; normal affect  Skin: There is moderate size right calf wound that is dressed and clean, mild erythema noted over the right shin and surrounding area    LABORATORY/IMAGING DATA:  All relevant labs and imaging data in [facility] and/or Care Everywhere were personally reviewed today.      Most Recent 3 CBC's:Recent Labs   Lab Test 11/24/23  1012 11/17/23  1033 11/01/23  0613   WBC 7.0 7.8 6.4   HGB 9.9* 10.5* 9.0*   MCV 94 95 96    218 268     Most Recent 3 BMP's:Recent Labs   Lab Test 11/24/23  1012 11/17/23  1033 11/15/23  0800 11/01/23  0814 11/01/23  0613    141  --   --  140   POTASSIUM 4.5 4.3  --   --  4.4   CHLORIDE 101 101  --   --  100   CO2 28 29  --   --  29   BUN 35.2* 27.9*  --   --  18.0   CR 0.98* 0.93  --   --  0.96*   ANIONGAP 11 11  --   --  11   ZAKIA 10.4* 10.9*  --   --  9.8   * 205* 141*   < > 149*    < > = values in this interval not displayed.     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  Culture " polymicrobial.  Treated with meropenem and vancomycin inpatient.  Afebrile and hemodynamic stable.  Plan:  Continue wound care per instructions  Continue pain management with methadone and as needed acetaminophen as ordered  Follow-up with vascular surgery as directed    Right lower extremity cellulitis,  Traumatic right calf wound, initial encounter.  She has sustained right calf wound after hitting the bed in TCU.  Started on 10-day course of cephalexin in TCU.  Plan:  Continue wound care/dressing per orders  Continue cephalexin 500 mg 3 times daily through December 2, 2023  Monitor    Lower extremity venous stasis.  Echocardiogram in May 2022 with LVEF 69%, mild mitral and tricuspid valve regurgitation, elevated RV systolic pressure of 47.6 mmHg, and normal RV systolic function.  Plan:  Continue torsemide 15 mg daily  Continue lymphedema therapy  Monitor    ABLA.  Patient was transfused with 1 unit of PRBC on October 10 for hemoglobin 6.6.  Last hemoglobin 9.9 on November 24.  Plan:  Monitor hemoglobin periodically  Neck CBC on December 1    DM type II,  Diabetic polyneuropathy.  Last hemoglobin A1c 7% on September 23, 2023.  Metformin and Ozempic were discontinued in LTACH.  Blood glucose levels are in range of 149-223.  Plan:  Increase insulin degludec from 12 units to 14 units subcu at bedtime  Continue sliding scale NovoLog  Monitor blood glucose before meals and at bedtime    Hypertension.  Blood pressure is fairly controlled.  Plan:  Continue torsemide 15 mg daily  Monitor blood pressure    Dyslipidemia,  PAD (diffuse atherosclerotic disease per lower extremity arterial Doppler in September 2023).  Plan:  Continue aspirin 81 mg daily  Continue atorvastatin 40 mg daily    Atrial fibrillation.  Seems to be persistent.   Not on rate control medications.  Currently on warfarin 2.5 mg every Tuesday, Wednesday, Thursday, Friday, Saturday, and Sunday, and 2 mg every Monday.  Rate is controlled.  Plan:  Continue  anticoagulation with warfarin  Adjust warfarin dose for INR target 2-3  Next INR on December 1   Monitor heart rate    CKD stage II-IIIa.  Baseline creatinine 0.9-1.1  Last creatinine 0.98 on November 24, 2023.  Plan:  Avoid NSAIDs and nephrotoxins  Monitor renal function periodically  Next BMP on December 1    Primary hyperparathyroidism.  Last calcium level 10.4 on November 24.  Plan  Monitor calcium level periodically  Follow-up as outpatient    COPD,  Mild intermittent asthma.  Not on inhalers.  Stable from a respiratory standpoint.  Plan:,   Monitor respiratory status    Chronic pain,  Opiate dependence.  Plan:  Continue methadone 5 mg 3 times daily and 10 mg at 3 PM as ordered    Pressure injury of left buttock.  Acute.  Plan:  Continue wound care per instructions  Monitor skin for further breakdown    Slow transit constipation.  Plan:  Continue the bowel regimen    Severe obesity, BMI 36,  BERLIN.  Noncompliant with CPAP for several years.  Plan:  Monitor O2 sats    Physical deconditioning.  Plan:  Continue PT/OT evaluation and therapy        Orders written by provider at facility:  Increase insulin degludec to 14 units subcu at bedtime, hold for glucose less than 100, DX: DM type II        Total time: 65 minutes including face to face time with the patient, reviewing the chart in EPIC, adjusting medications, communicating the plan of care with RN, and documenting all information electronically.      Disclaimer: This note contains text created using speech-recognition software and may have unintended word substitutions.      Electronically signed by:  Gatito Escobar MD

## 2023-11-29 ENCOUNTER — TRANSITIONAL CARE UNIT VISIT (OUTPATIENT)
Dept: GERIATRICS | Facility: CLINIC | Age: 78
End: 2023-11-29
Payer: COMMERCIAL

## 2023-11-29 VITALS
HEIGHT: 66 IN | DIASTOLIC BLOOD PRESSURE: 74 MMHG | SYSTOLIC BLOOD PRESSURE: 152 MMHG | BODY MASS INDEX: 36.62 KG/M2 | HEART RATE: 76 BPM | WEIGHT: 227.9 LBS | RESPIRATION RATE: 18 BRPM | OXYGEN SATURATION: 98 % | TEMPERATURE: 97.9 F

## 2023-11-29 DIAGNOSIS — I48.91 ATRIAL FIBRILLATION, UNSPECIFIED TYPE (H): ICD-10-CM

## 2023-11-29 DIAGNOSIS — I87.8 VENOUS STASIS: ICD-10-CM

## 2023-11-29 DIAGNOSIS — J44.9 CHRONIC OBSTRUCTIVE PULMONARY DISEASE, UNSPECIFIED COPD TYPE (H): ICD-10-CM

## 2023-11-29 DIAGNOSIS — D62 ACUTE BLOOD LOSS ANEMIA: ICD-10-CM

## 2023-11-29 DIAGNOSIS — N18.31 STAGE 3A CHRONIC KIDNEY DISEASE (H): ICD-10-CM

## 2023-11-29 DIAGNOSIS — M86.672 OTHER CHRONIC OSTEOMYELITIS OF LEFT FOOT (H): ICD-10-CM

## 2023-11-29 DIAGNOSIS — K59.01 SLOW TRANSIT CONSTIPATION: ICD-10-CM

## 2023-11-29 DIAGNOSIS — Z79.4 TYPE 2 DIABETES MELLITUS WITH DIABETIC POLYNEUROPATHY, WITH LONG-TERM CURRENT USE OF INSULIN (H): ICD-10-CM

## 2023-11-29 DIAGNOSIS — E11.42 TYPE 2 DIABETES MELLITUS WITH DIABETIC POLYNEUROPATHY, WITH LONG-TERM CURRENT USE OF INSULIN (H): ICD-10-CM

## 2023-11-29 DIAGNOSIS — G89.29 OTHER CHRONIC PAIN: ICD-10-CM

## 2023-11-29 DIAGNOSIS — I73.9 PAD (PERIPHERAL ARTERY DISEASE) (H): ICD-10-CM

## 2023-11-29 DIAGNOSIS — E21.0 PRIMARY HYPERPARATHYROIDISM (H): ICD-10-CM

## 2023-11-29 DIAGNOSIS — E78.5 DYSLIPIDEMIA: ICD-10-CM

## 2023-11-29 DIAGNOSIS — J45.20 MILD INTERMITTENT ASTHMA WITHOUT COMPLICATION: ICD-10-CM

## 2023-11-29 DIAGNOSIS — L03.115 CELLULITIS OF RIGHT LOWER EXTREMITY: ICD-10-CM

## 2023-11-29 DIAGNOSIS — E11.621 DIABETIC ULCER OF LEFT HEEL ASSOCIATED WITH TYPE 2 DIABETES MELLITUS, WITH NECROSIS OF BONE (H): Primary | ICD-10-CM

## 2023-11-29 DIAGNOSIS — G47.33 OSA (OBSTRUCTIVE SLEEP APNEA): ICD-10-CM

## 2023-11-29 DIAGNOSIS — L97.424 DIABETIC ULCER OF LEFT HEEL ASSOCIATED WITH TYPE 2 DIABETES MELLITUS, WITH NECROSIS OF BONE (H): Primary | ICD-10-CM

## 2023-11-29 DIAGNOSIS — I10 ESSENTIAL HYPERTENSION: ICD-10-CM

## 2023-11-29 DIAGNOSIS — Z89.512 STATUS POST BELOW-KNEE AMPUTATION OF LEFT LOWER EXTREMITY (H): ICD-10-CM

## 2023-11-29 DIAGNOSIS — R53.81 PHYSICAL DECONDITIONING: ICD-10-CM

## 2023-11-29 DIAGNOSIS — E66.01 SEVERE OBESITY (H): ICD-10-CM

## 2023-11-29 PROCEDURE — 99306 1ST NF CARE HIGH MDM 50: CPT | Performed by: INTERNAL MEDICINE

## 2023-11-29 NOTE — LETTER
11/29/2023        RE: Linda Loredo  [address]  [address]  [facility] [address]        [place] [facility]  INITIAL VISIT NOTE  November 29, 2023    PRIMARY CARE PROVIDER AND CLINIC:  [facility] [address] [facility] [address]    Chief Complaint   Patient presents with     Hospital F/U       HPI:    Linda Loredo is a 78 year old  (1945) female who was seen at [facility] on November 29, 2023 for an initial visit.     Medical history is notable for DM type II, diabetic neuropathy, left heel diabetic ulcer, hypertension, dyslipidemia, PAD, atrial fibrillation, CKD, COPD, asthma, MRSA infection, UTI, herpes zoster, primary hyperparathyroidism, umbilical hernia, chronic pain, depression, anxiety, osteopenia, severe obesity, and BERLIN.    Summary of hospital course:  Patient was initially hospitalized at [facility] from September 29 through October 24, 2023 for left heel diabetic ulcer and left calcaneal osteomyelitis for which she underwent left BKA on October 7, 2023 and sharp excisional debridement and tibial/fibular resection on October 14, 2023.  Tissue culture was polymicrobial.  She was treated with vancomycin and meropenem inpatient.  Hospital course was complicated by ABLA with a ralf hemoglobin 6.6 for which she received 1 unit of PRBC on October 10.  She was discharged to [facility] where she stayed from October 24 through November 15, 2023.    Patient is now admitted to this facility for ongoing medical management, nursing care, and rehab.     Of note, history was obtained from patient, facility RN, and extensive review of the chart.    Today's visit:  Patient was seen in her room, while sitting in a wheelchair.  She appears somewhat frail but comfortable.  She has been started on cephalexin for right lower extremity cellulitis.  She has  sustained a wound to her right calf after hitting the bed in TCU.  She denies fever, chills, chest pain, palpitation, dyspnea, nausea, vomiting, abdominal pain, or urinary symptoms.  She had a bowel movement 3 days ago.      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, 2023 and sharp excisional debridement and tibial/fibular resection October 14, 2023  Lower extremity venous stasis  DM type II  Diabetic polyneuropathy  Hypertension  Dyslipidemia  PAD (diffuse atherosclerotic disease per lower extremity arterial Doppler in September 2023)  Atrial fibrillation  CKD stage II-IIIa, baseline creatinine 0.9-1.1  COPD  Mild intermittent asthma  MRSA nares  UTI  Herpes zoster  Primary hyperparathyroidism  Umbilical hernia  Chronic pain  Depression  Anxiety  Osteopenia  Severe obesity, BMI 36  BERLIN, noncompliant with CPAP for several years    Past Medical History:   Diagnosis Date     Depressive disorder, not elsewhere classified      Herpes zoster without mention of complication      Hypercalcemia 2/24/2007     Mild intermittent asthma      Other urinary incontinence      Type II or unspecified type diabetes mellitus with renal manifestations, uncontrolled(250.42) (H)      Type II or unspecified type diabetes mellitus without mention of complication, not stated as uncontrolled      Unspecified essential hypertension        PAST SURGICAL HISTORY:   Past Surgical History:   Procedure Laterality Date     AMPUTATE LEG BELOW KNEE Left 10/7/2023    Procedure: LEFT GUILLOTINE BELOW KNEE AMPUTATION.;  Surgeon: Lan Otto MD;  Location:  OR     AMPUTATE LEG BELOW KNEE Left 10/14/2023    Procedure: debridement of left below the knee amputation;  Surgeon: Lan Otto MD;  Location:  OR     CHOLECYSTECTOMY       INCISION AND DRAINAGE FOOT, COMBINED Left 9/26/2023    Procedure:  Surgical debridement of all nonviable skin and soft tissue and bone left foot;  Surgeon: Nolberto Thorne DPM;  Location: [facility] OR     IR LOWER EXTREMITY ANGIOGRAM LEFT  10/3/2023     ligation of fallopian tube       OPEN REDUCTION INTERNAL FIXATION ANKLE  10/13/11    left     pinning of left 2nd toe          FAMILY HISTORY:   Family History   Problem Relation Age of Onset     Hypertension Mother      Heart Disease Father         heart attack and cardiomegaly     Diabetes Sister         type 2     Hypertension Sister      Respiratory Sister      Psychotic Disorder Sister         bipolar     Cancer Maternal Grandmother         throat     Cancer Paternal Grandmother         gastric       SOCIAL HISTORY:  Social History     Tobacco Use     Smoking status: Never     Smokeless tobacco: Never   Substance Use Topics     Alcohol use: No       MEDICATIONS:  Current Outpatient Medications   Medication Sig Dispense Refill     acetaminophen (TYLENOL) 325 MG tablet Take 2 tablets (650 mg) by mouth every 6 hours as needed for mild pain or other (and adjunct with moderate or severe pain or per patient request)       alteplase (CATHFLO ACTIVASE) 2 MG injection Triple Lumen PICC to RUE. May flush each lumen of 2mg each for blood return needs 3 mL 1     aspirin 81 MG EC tablet Take 1 tablet (81 mg) by mouth daily       atorvastatin (LIPITOR) 40 MG tablet Take 1 tablet (40 mg) by mouth every evening       bisacodyl (DULCOLAX) 10 MG suppository Place 1 suppository (10 mg) rectally daily as needed for constipation 30 suppository 0     cephALEXin (KEFLEX) 500 MG capsule Take 1 capsule (500 mg) by mouth 3 times daily for 10 days 30 capsule 0     diphenhydrAMINE (BENADRYL) 25 MG tablet Take 1 tablet (25 mg) by mouth every 6 hours as needed for itching or allergies 60 tablet 3     hydrocortisone 2.5 % cream Apply topically 2 times daily Apply to back, arms and to redness to RLE BID and BID PRN 30 g 11     insulin aspart (NOVOLOG PEN) 100  UNIT/ML pen Inject 1-7 Units Subcutaneous 3 times daily (before meals) 15 mL 0     insulin aspart (NOVOLOG PEN) 100 UNIT/ML pen Inject 1-5 Units Subcutaneous at bedtime 15 mL 0     insulin degludec (TRESIBA) 200 UNIT/ML pen Inject 12 Units Subcutaneous at bedtime Around 11-11:30 pm       loperamide (IMODIUM A-D) 2 MG tablet Take 1 tablet (2 mg) by mouth 4 times daily as needed for diarrhea 60 tablet 1     methadone (DOLOPHINE) 10 MG tablet Take 1 tablet (10 mg) by mouth daily Daily at 1500 30 tablet 0     methadone (DOLOPHINE) 5 MG tablet Take 1 tablet (5 mg) by mouth 3 times daily 90 tablet 0     miconazole (MICATIN) 2 % external powder Apply topically 2 times daily       multivitamin w/minerals (THERA-VIT-M) tablet Take 1 tablet by mouth daily       mupirocin (BACTROBAN) 2 % external ointment Apply topically 2 times daily Apply to RLE lesion BID 60 g 11     ondansetron (ZOFRAN ODT) 4 MG ODT tab Take 1 tablet (4 mg) by mouth every 6 hours as needed for nausea or vomiting 21 tablet 0     polyethylene glycol (MIRALAX) 17 GM/Dose powder Take 17 g by mouth 2 times daily as needed for constipation       senna-docusate (SENOKOT-S/PERICOLACE) 8.6-50 MG tablet Take 2 tablets by mouth 2 times daily as needed for constipation       sodium chloride, PF, (SODIUM CHLORIDE FLUSH) 0.9% PF flush Flush each PICC lumen (x3) with 10mL 0.9% sodium chloride daily and PRN before and after use. 1000 mL 11     torsemide (DEMADEX) 5 MG tablet Take 3 tablets (15 mg) by mouth daily HOLD if SBP<100       vitamin C (ASCORBIC ACID) 500 MG tablet Take 500 mg by mouth daily       Warfarin Therapy Reminder Per INR         MED REC REQUIRED  Post Medication Reconciliation Status: medication reconcilation previously completed during another office visit        ALLERGIES:  Allergies   Allergen Reactions     Prednisone Nausea and Vomiting     Morphine Nausea and Vomiting     Morphine [Fumaric Acid] Nausea and Vomiting       ROS:  10 point ROS were  "negative other than the symptoms noted above in the HPI.    PHYSICAL EXAM:  Vital signs were reviewed in the chart.  Vital Signs: BP (!) 152/74   Pulse 76   Temp 97.9  F (36.6  C)   Resp 18   Ht 1.676 m (5' 6\")   Wt 103.4 kg (227 lb 14.4 oz)   SpO2 98%   BMI 36.78 kg/m    General: Somewhat frail appearing but comfortable and in no acute distress  HEENT: Conjunctival pallor, no scleral icterus or injection, moist oral mucosa  Cardiovascular: Normal S1, S2, irregularly irregular rhythm  Respiratory: Lungs clear to auscultation bilaterally  GI: Abdomen soft, non-tender, non-distended, +BS  Extremities: 3-4+ right lower extremity edema,, s/p L BKA   Neuro: CX II-XII grossly intact; ROM in all four extremities grossly intact  Psych: Alert and oriented x3; normal affect  Skin: There is moderate size right calf wound that is dressed and clean, mild erythema noted over the right shin and surrounding area    LABORATORY/IMAGING DATA:  All relevant labs and imaging data in [facility] and/or Care Everywhere were personally reviewed today.      Most Recent 3 CBC's:Recent Labs   Lab Test 11/24/23  1012 11/17/23  1033 11/01/23  0613   WBC 7.0 7.8 6.4   HGB 9.9* 10.5* 9.0*   MCV 94 95 96    218 268     Most Recent 3 BMP's:Recent Labs   Lab Test 11/24/23  1012 11/17/23  1033 11/15/23  0800 11/01/23  0814 11/01/23  0613    141  --   --  140   POTASSIUM 4.5 4.3  --   --  4.4   CHLORIDE 101 101  --   --  100   CO2 28 29  --   --  29   BUN 35.2* 27.9*  --   --  18.0   CR 0.98* 0.93  --   --  0.96*   ANIONGAP 11 11  --   --  11   ZAKIA 10.4* 10.9*  --   --  9.8   * 205* 141*   < > 149*    < > = values in this interval not displayed.     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  Culture polymicrobial.  Treated with meropenem and vancomycin inpatient.  Afebrile and hemodynamic stable.  Plan:  Continue wound care per instructions  Continue pain management with methadone and as needed acetaminophen as ordered  Follow-up with vascular surgery as directed    Right lower extremity cellulitis,  Traumatic right calf wound, initial encounter.  She has sustained right calf wound after hitting the bed in TCU.  Started on 10-day course of cephalexin in TCU.  Plan:  Continue wound care/dressing per orders  Continue cephalexin 500 mg 3 times daily through December 2, 2023  Monitor    Lower extremity venous stasis.  Echocardiogram in May 2022 with LVEF 69%, mild mitral and tricuspid valve regurgitation, elevated RV systolic pressure of 47.6 mmHg, and normal RV systolic function.  Plan:  Continue torsemide 15 mg daily  Continue lymphedema therapy  Monitor    ABLA.  Patient was transfused with 1 unit of PRBC on October 10 for hemoglobin 6.6.  Last hemoglobin 9.9 on November 24.  Plan:  Monitor hemoglobin periodically  Neck CBC on December 1    DM type II,  Diabetic polyneuropathy.  Last hemoglobin A1c 7% on September 23, 2023.  Metformin and Ozempic were discontinued in [facility]ACH.  Blood glucose levels are in range of 149-223.  Plan:  Increase insulin degludec from 12 units to 14 units subcu at bedtime  Continue sliding scale NovoLog  Monitor blood glucose before meals and at bedtime    Hypertension.  Blood pressure is fairly controlled.  Plan:  Continue torsemide 15 mg daily  Monitor blood pressure    Dyslipidemia,  PAD (diffuse atherosclerotic disease per lower extremity arterial Doppler in September 2023).  Plan:  Continue aspirin 81 mg daily  Continue atorvastatin 40 mg daily    Atrial fibrillation.  Seems to be persistent.   Not on rate control medications.  Currently on warfarin 2.5 mg every Tuesday, Wednesday, Thursday,  Friday, Saturday, and Sunday, and 2 mg every Monday.  Rate is controlled.  Plan:  Continue anticoagulation with warfarin  Adjust warfarin dose for INR target 2-3  Next INR on December 1   Monitor heart rate    CKD stage II-IIIa.  Baseline creatinine 0.9-1.1  Last creatinine 0.98 on November 24, 2023.  Plan:  Avoid NSAIDs and nephrotoxins  Monitor renal function periodically  Next BMP on December 1    Primary hyperparathyroidism.  Last calcium level 10.4 on November 24.  Plan  Monitor calcium level periodically  Follow-up as outpatient    COPD,  Mild intermittent asthma.  Not on inhalers.  Stable from a respiratory standpoint.  Plan:,   Monitor respiratory status    Chronic pain,  Opiate dependence.  Plan:  Continue methadone 5 mg 3 times daily and 10 mg at 3 PM as ordered    Pressure injury of left buttock.  Acute.  Plan:  Continue wound care per instructions  Monitor skin for further breakdown    Slow transit constipation.  Plan:  Continue the bowel regimen    Severe obesity, BMI 36,  BERLIN.  Noncompliant with CPAP for several years.  Plan:  Monitor O2 sats    Physical deconditioning.  Plan:  Continue PT/OT evaluation and therapy        Orders written by provider at facility:  Increase insulin degludec to 14 units subcu at bedtime, hold for glucose less than 100, DX: DM type II        Total time: 65 minutes including face to face time with the patient, reviewing the chart in EPIC, adjusting medications, communicating the plan of care with RN, and documenting all information electronically.      Disclaimer: This note contains text created using speech-recognition software and may have unintended word substitutions.      Electronically signed by:  Gatito Escobar MD                        Sincerely,        Gatito Escobar MD

## 2023-11-30 ENCOUNTER — LAB REQUISITION (OUTPATIENT)
Dept: LAB | Facility: CLINIC | Age: 78
End: 2023-11-30
Payer: COMMERCIAL

## 2023-11-30 VITALS
HEIGHT: 66 IN | TEMPERATURE: 97.9 F | BODY MASS INDEX: 37.25 KG/M2 | HEART RATE: 80 BPM | WEIGHT: 231.8 LBS | DIASTOLIC BLOOD PRESSURE: 75 MMHG | OXYGEN SATURATION: 98 % | RESPIRATION RATE: 18 BRPM | SYSTOLIC BLOOD PRESSURE: 135 MMHG

## 2023-11-30 DIAGNOSIS — N18.30 CHRONIC KIDNEY DISEASE, STAGE 3 UNSPECIFIED (H): ICD-10-CM

## 2023-11-30 DIAGNOSIS — I48.0 PAROXYSMAL ATRIAL FIBRILLATION (H): ICD-10-CM

## 2023-11-30 DIAGNOSIS — I15.1 HYPERTENSION SECONDARY TO OTHER RENAL DISORDERS: ICD-10-CM

## 2023-11-30 DIAGNOSIS — D64.9 ANEMIA, UNSPECIFIED: ICD-10-CM

## 2023-11-30 NOTE — PROGRESS NOTES
"[facility]  [place] Medical Record Number:  [id number]  Place of Service where encounter took place: [facility]TC[facility])([facility]) [[id number]]    HPI:    Linda Loredo is a 78 year old  (1945), who is being seen today for an episodic care visit at the above location. Today's concern is INR/Coumadin management for ELIE. Fib    ROS/Subjective:  Bleeding Signs/Symptoms:  None  Thromboembolic Signs/Symptoms:  None  Medication Changes:  No  Dietary Changes:  No  Activity Changes: No  Bacterial/Viral Infection:  Yes: completing antibiotic course for cellulitis today  Missed Coumadin Doses:  None  On ASA: Yes - 81 mg po q day  Other Concerns:  No    OBJECTIVE:  /75   Pulse 80   Temp 97.9  F (36.6  C)   Resp 18   Ht 1.676 m (5' 6\")   Wt 105.1 kg (231 lb 12.8 oz)   SpO2 98%   BMI 37.41 kg/m    Last INR: 3.50 on 11/24/23  INR Today:  3.40 via finger stick. Unable to draw peripherally per labs x 2.   Current Dose:  2mg on Mondays and 2.5mg AOD    ASSESSMENT:     Paroxysmal atrial fibrillation (H)  Warfarin-induced coagulopathy   Long term (current) use of anticoagulants  Encounter for therapeutic drug monitoring  Supratherapeutic INR for goal of 2-3    PLAN/ORDERS:   New Dose: 2mg daily    Next INR: 1 week 12/8/23      Electronically signed by:  Dr. Haylie Rowe DNP, APRN, FNP-C, WCS-C, EDS-C    "

## 2023-12-01 ENCOUNTER — TRANSITIONAL CARE UNIT VISIT (OUTPATIENT)
Dept: GERIATRICS | Facility: CLINIC | Age: 78
End: 2023-12-01
Payer: COMMERCIAL

## 2023-12-01 DIAGNOSIS — L98.9 SKIN LESION: ICD-10-CM

## 2023-12-01 DIAGNOSIS — T45.515A WARFARIN-INDUCED COAGULOPATHY (H): ICD-10-CM

## 2023-12-01 DIAGNOSIS — I48.0 PAROXYSMAL ATRIAL FIBRILLATION (H): Primary | ICD-10-CM

## 2023-12-01 DIAGNOSIS — Z79.01 LONG TERM (CURRENT) USE OF ANTICOAGULANTS: ICD-10-CM

## 2023-12-01 DIAGNOSIS — Z51.81 ENCOUNTER FOR THERAPEUTIC DRUG MONITORING: ICD-10-CM

## 2023-12-01 DIAGNOSIS — D68.32 WARFARIN-INDUCED COAGULOPATHY (H): ICD-10-CM

## 2023-12-01 PROCEDURE — 99308 SBSQ NF CARE LOW MDM 20: CPT | Performed by: NURSE PRACTITIONER

## 2023-12-01 RX ORDER — MUPIROCIN 20 MG/G
OINTMENT TOPICAL 2 TIMES DAILY
Start: 2023-12-01 | End: 2024-01-26

## 2023-12-01 NOTE — LETTER
"    12/1/2023        RE: Linda Loredo  [address]  [address]  [facility] [address]        [facility] Medical Record Number:  [id number]  Place of Service where encounter took place: [facility] ([facility])([facility]) [[id number]]    HPI:    Linda Loredo is a 78 year old  (1945), who is being seen today for an episodic care visit at the above location. Today's concern is INR/Coumadin management for ELIE. Fib    ROS/Subjective:  Bleeding Signs/Symptoms:  None  Thromboembolic Signs/Symptoms:  None  Medication Changes:  No  Dietary Changes:  No  Activity Changes: No  Bacterial/Viral Infection:  Yes: completing antibiotic course for cellulitis today  Missed Coumadin Doses:  None  On ASA: Yes - 81 mg po q day  Other Concerns:  No    OBJECTIVE:  /75   Pulse 80   Temp 97.9  F (36.6  C)   Resp 18   Ht 1.676 m (5' 6\")   Wt 105.1 kg (231 lb 12.8 oz)   SpO2 98%   BMI 37.41 kg/m    Last INR: 3.50 on 11/24/23  INR Today:  3.40 via finger stick. Unable to draw peripherally per labs x 2.   Current Dose:  2mg on Mondays and 2.5mg AOD    ASSESSMENT:     Paroxysmal atrial fibrillation (H)  Warfarin-induced coagulopathy   Long term (current) use of anticoagulants  Encounter for therapeutic drug monitoring  Supratherapeutic INR for goal of 2-3    PLAN/ORDERS:   New Dose: 2mg daily    Next INR: 1 week 12/8/23      Electronically signed by:  Dr. Haylie Rowe DNP, APRN, FNP-C, WCS-C, EDS-C        Sincerely,        Haylie Rowe, MOE CNP      "

## 2023-12-03 NOTE — PROGRESS NOTES
[facility] GERIATRICS    Chief Complaint   Patient presents with    RECHECK     HPI:  Linda Loredo is a 78 year old  (1945), who is being seen today for an episodic care visit at: [facility] ([facility])([facility]) [[id number]]. Today's concern is: The primary encounter diagnosis was Paroxysmal atrial fibrillation (H). Diagnoses of Warfarin-induced coagulopathy , Long term (current) use of anticoagulants, Skin lesion, Diabetic ulcer of left heel associated with type 2 diabetes mellitus, with necrosis of bone (H), Other chronic osteomyelitis of left foot (H), 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), Cellulitis of right lower extremity, Venous stasis, Acute blood loss anemia, Type 2 diabetes mellitus with diabetic polyneuropathy, with long-term current use of insulin (H), Essential hypertension, Dyslipidemia, PAD (peripheral artery disease) (H24), Atrial fibrillation, unspecified type (H), Stage 3a chronic kidney disease (H), Primary hyperparathyroidism (H24), Chronic obstructive pulmonary disease, unspecified COPD type (H), Mild intermittent asthma without complication, Other chronic pain, Slow transit constipation, Severe obesity (H), BERLIN (obstructive sleep apnea), Physical deconditioning, Generalized muscle weakness, Diabetic ulcer of left heel associated with type 2 diabetes mellitus, with muscle involvement without evidence of necrosis (H), Status post debridement, Osteomyelitis of left foot, unspecified type (H), Opioid dependence, uncomplicated (H), Chronic pain syndrome, Chronic right-sided heart failure (H), and Major depressive disorder, recurrent episode, moderate (H) were also pertinent to this visit.    Met with patient who was found sitting upright in wheelchair. She denies any chest pain, palpitations, shortness of breath, RASHID, lightheadedness, dizziness, or cough. Denies any abdominal discomfort. Denies N&V. Denies dysuria or frequency.  "Reports stools are on softer side despite not using any BM agents. Appetite good. Sleeping well. No complaints of itch, Minimal pain complaints. Rash improving to UE. Redness resolving to RLE. Open wounds present but appear to be slowly resolving with current regimen.     BP Readings from Last 3 Encounters:   12/04/23 (!) 143/80   11/30/23 135/75   11/29/23 (!) 152/74     Wt Readings from Last 5 Encounters:   12/04/23 107.4 kg (236 lb 12.8 oz)   11/30/23 105.1 kg (231 lb 12.8 oz)   11/29/23 103.4 kg (227 lb 14.4 oz)   11/27/23 102.7 kg (226 lb 6.4 oz)   11/24/23 100.7 kg (222 lb)     Allergies, and PMH/PSH reviewed in EPIC today.  REVIEW OF SYSTEMS:  10 point ROS of systems including Constitutional, Eyes, Respiratory, Cardiovascular, Gastroenterology, Genitourinary, Integumentary, Musculoskeletal, Psychiatric were all negative except for pertinent positives noted in my HPI.    Objective:   BP (!) 143/80   Pulse 78   Temp 97.9  F (36.6  C)   Resp 18   Ht 1.676 m (5' 6\")   Wt 107.4 kg (236 lb 12.8 oz)   SpO2 94%   BMI 38.22 kg/m    GENERAL APPEARANCE:  Alert, in no distress, oriented, morbidly obese, cooperative  ENT:  Mouth and posterior oropharynx normal, moist mucous membranes, normal hearing acuity  EYES:  EOM, conjunctivae, lids, pupils and irises normal  NECK:  No adenopathy,masses or thyromegaly  RESP:  respiratory effort and palpation of chest normal, lungs clear to auscultation , no respiratory distress  CV:  Palpation and auscultation of heart done , regular rate and rhythm, no murmur, rub, or gallop, peripheral edema 2+ in RLE  ABDOMEN:  normal bowel sounds, soft, nontender, no hepatosplenomegaly or other masses, no guarding or rebound  M/S:   Slide board transfers. Wheelchair bound  SKIN:  Inspection of skin and subcutaneous tissue baseline, wound healing well, no signs of infection see photos  NEURO:   Cranial nerves 2-12 are normal tested and grossly at patient's baseline, no purposeful movement " in upper and lower extremities  PSYCH:  oriented X 3, normal insight, judgement and memory, affect and mood normal            Most Recent 3 CBC's:  Recent Labs   Lab Test 11/24/23  1012 11/17/23  1033 11/01/23  0613   WBC 7.0 7.8 6.4   HGB 9.9* 10.5* 9.0*   MCV 94 95 96    218 268     Most Recent 3 BMP's:  Recent Labs   Lab Test 11/24/23  1012 11/17/23  1033 11/15/23  0800 11/01/23  0814 11/01/23  0613    141  --   --  140   POTASSIUM 4.5 4.3  --   --  4.4   CHLORIDE 101 101  --   --  100   CO2 28 29  --   --  29   BUN 35.2* 27.9*  --   --  18.0   CR 0.98* 0.93  --   --  0.96*   ANIONGAP 11 11  --   --  11   ZAKIA 10.4* 10.9*  --   --  9.8   * 205* 141*   < > 149*    < > = values in this interval not displayed.     Most Recent 2 LFT's:  Recent Labs   Lab Test 11/17/23  1033 11/01/23  0613   AST 20 19   ALT 11 12   ALKPHOS 90 97   BILITOTAL 0.5 0.2     Most Recent 3 INR's:  Recent Labs   Lab Test 11/24/23  1012 11/17/23  1033 11/15/23  0644   INR 3.50* 1.69* 2.45*     Most Recent TSH and T4:  Recent Labs   Lab Test 11/24/23  1012   TSH 3.46   T4 1.20     Most Recent Hemoglobin A1c:  Recent Labs   Lab Test 09/23/23  0533   A1C 7.0*     Most Recent Anemia Panel:  Recent Labs   Lab Test 11/24/23  1012   WBC 7.0   HGB 9.9*   HCT 31.9*   MCV 94          ASSESSMENT/PLAN:     (R53.81) Physical deconditioning  (primary encounter diagnosis)  (M62.81) Generalized muscle weakness  Comment: Acute on chronic. S/T below diagnosis. Still requiring max assistance for toileting cares along with wound care needs  Plan:   -Continue Physical therapy and Occupational therapy as directed  -SW to remain involved for safe discharge planning needs  -Suspect will need DME needs for discharge planning goals.      (E11.621,  L97.425) Diabetic ulcer of left heel associated with type 2 diabetes mellitus, with muscle involvement without evidence of necrosis (H)  (Z89.512) Status post below-knee amputation of left lower  extremity (H)  (Z98.890) Status post debridement  (M86.9) Osteomyelitis of left foot, unspecified type (H)  (F11.20) Opioid dependence, uncomplicated (H)  (G89.4) Chronic pain syndrome  (I83.019,  I83.029,  L97.919,  L97.929) Venous stasis ulcers of both lower extremities (H)  (I89.0) Lymphedema  Comment: Chronic. S/p debridement 9/26/23. S/p Left saritha BKA on 10/7/23. S/p tibial/fibular resection and debridement of stump 10/17/23. Followed by vascular. Completed course of meropenem/vancomycin 10/8 for osteomyelitis. Vascular surgery planning to close in the future, monthly clinic visits for wound checks with closure at unspecified time in the future  Plan:   -Monitor pain complaints  -In house wound provider to follow for ongoing needs   -Continue methadone 10mg daily at 1500 and 5mg TID scheduled.   -Tylenol PRN  -Continue triple lumen PICC to RUE as directed. Added daily flush and PRN along with dressing weekly changes. Would recommend removing PICC at this time due to non use, however patient wants to keep due to pending future surgery needs. No surgery date confirmed. Would need to remove if TCU discharge. Would recommend to remove around end of December given 3 month placement.   -BMP, CBC, and INR due 12/8/23  -Follow up with vascular as directed. Scheduled for 12/11/23 via telephone and 1/15/24   -Apply hemp lotion per her request to right heel dry skin BID--keep at bedside and staff to apply accordingly  -Continue current wound cares as directed;  *Wound Care - Left BKA : *Change daily* 1. Cleanse w/Vashe soaked gauze, including sreekanth wound skin, gentle pat dry. 2. Apply skin prep to sreekanth wound skin. 3. Soak Hydrofera Blue with saline and place over wound bed, cover with abd, and wrap with kerlix to secure     (E11.621,  L97.509,  Z79.4) Type 2 diabetes mellitus with foot ulcer, with long-term current use of insulin (H)  (E66.01) Obesity, Class III, BMI 40-49.9 (morbid obesity) (H)  Comment: Chronic.  Last A1c 7% in sept 2023. Goal <9%.   Plan:   -Monitor Blood Glucose QID as directed  -Continue sliding scale insulin as directed TID with meals and HS  -Continue tresiba 14 units at HS  -Recommend hgb A1c due Dec 2023.   -BMP, CBC, and INR due 12/8/23         (K59.01) Slow transit constipation  Comment: Acute on chronic.    Plan:   -Monitor BM patterns  -miralax 17gm BID PRN  -Imodium 2mg QID PRN added for diarrheal concerns. Use if needed  -Continue senna S 2 tabs BID PRN  -Zofran PRN  -Bisacodyl daily PRN  -BMP, CBC, and INR due 12/8/23     (I48.0) Paroxysmal atrial fibrillation (H)  (Z79.01) Long term (current) use of anticoagulants  (D68.32,  T45.515A) Warfarin-induced coagulopathy   (I10) Essential hypertension with goal blood pressure less than 140/90  (E78.2) Mixed hyperlipidemia  (N18.30) Stage 3 chronic kidney disease, unspecified whether stage 3a or 3b CKD (H)  (I50.812) Chronic right-sided heart failure (H)  Comment: Chronic. Baseline Creatinine~ 1-1.2. Based on JNC-8 goals,  patients age of 78 year old, presence of diabetes or CKD, and goals of care goal BP is  <140/90 mm Hg. Patient is stable with current plan of care and routine assessment..Holding lisinopril per chart review per patient wishes. INR goal 2-3.INR 12/1/23 via fingerstick was 3.40  Plan:   -Monitor for worsening s/symptoms of concerns  -Monitor BP and HR as directed  -Continue asa 81mg daily  -Continue atorvastatin 40mg daily  -Continue coumadin 2mg daily  -Monitor weights as directed. Baseline weight around 222-224# on admission but now weight noted to be 235#. If weight continues to trend upward, would recommend diuretic dose adjustment   -Continue Torsemide 15mg daily. HOLD if SBP<100  -BMP, CBC, and INR due 12/8/23          (R62.7) Failure to thrive in adult  (F41.9) Anxiety  (F33.1) Major depressive disorder, recurrent episode, moderate (H)  Comment: Chronic. S/T current health condition  Plan:   -Monitor mood and  behaviors  -Monitor for worsening s/symptoms of concerns  -Dietician to remain involved  -ACP offered while on TCU and declined at this time  -Monitor for changes in mobility, eating and sleeping patterns  -BMP, CBC, and INR due 12/8/23     (R60.0) Edema of right extremity  (L53.9) Redness  (L03.115) Cellulitis of right lower extremity  Comment: Acute. Noted increased redness to RLE with warmth and pain soon after admission. Currently on coumadin. US negative for DVT risks. Now open area noted to back of calf. Due to increased redness with wound present is currently being treated for suspected cellulitis. Unable to start prednisone given allergy risk. Suspect edema may also be contributing to wound concerns to RLE.   Plan:  -Lymphedema therapy to assess and assist with edema control---currently on hold for now per request  -Continue wound care to RLE for now:   *Cleanse with normal saline. Apply bactroban to open areas BID. Leave NANCY  -Monitor for worsening s/symptoms of concerns  -Continue benadryl every 6 hours PRN  -Continue hydrocortisone cream to apply to back, arms and right leg redness BID and BID PRN  -BMP, CBC, and INR due 12/8/23      (J44.9) Chronic obstructive pulmonary disease, unspecified COPD type (H)  (G47.33) BERLIN (obstructive sleep apnea)  Comment: Chronic. Non compliant with CPAP use.   Plan:   -Monitor respiratory status  -Would recommend CPAP restart if compliant  -BMP, CBC, and INR due 12/8/23     (L30.9) Dermatitis  Comment: Acute on chronic. History of allergic reaction to bilateral hands per chart review.   Plan:   -Monitor for worsening s/symptoms of concerns  -Benadryl PRN  -Hydrocortisone 2.5% topically BID to back, arms and redness to RLE BID and BID PRN  -BMP, CBC, and INR due 12/8/23     (E21.0) Primary hyperparathyroidism (H24)  Comment: Acute. Noted on chart review. Last TSH level stable of 3.46   Plan:   -Not currently on any supplementation.   -Monitor for worsening s/symptoms of  concerns  -BMP, CBC, and INR due 12/8/23     (L89.320) Pressure injury of left buttock  Comment: Acute. History of reports of skin breakdown in the past to buttock region, however these areas have healed PTA. Now developed upper buttock pressure injuries. Suspect due to friction S/T slide board, however patient does not agree. She believes this is from stool being left on skin without proper hygiene clean up. I also educated use of slide board which I suspect is cause, however she declines.   Plan:  -Monitor skin for further breakdown  -In house skin/wound provider to follow  -Continue wound treatment course as directed:  *Wound care to buttock open wounds. Cleanse with normal saline. Pat dry, use barrier skin to surrounding skin, then cover w/sacral mepilex.  -Monitor for worsening s/symptoms of concerns  -BMP, CBC, and INR due 12/8/23     Electronically signed by:  Dr. Haylie Rowe DNP, APRN, FNP-C, WCS-C, EDS-C

## 2023-12-04 ENCOUNTER — TRANSITIONAL CARE UNIT VISIT (OUTPATIENT)
Dept: GERIATRICS | Facility: CLINIC | Age: 78
End: 2023-12-04
Payer: COMMERCIAL

## 2023-12-04 VITALS
RESPIRATION RATE: 18 BRPM | SYSTOLIC BLOOD PRESSURE: 143 MMHG | HEIGHT: 66 IN | HEART RATE: 78 BPM | TEMPERATURE: 97.9 F | BODY MASS INDEX: 38.06 KG/M2 | OXYGEN SATURATION: 94 % | DIASTOLIC BLOOD PRESSURE: 80 MMHG | WEIGHT: 236.8 LBS

## 2023-12-04 DIAGNOSIS — E66.01 SEVERE OBESITY (H): ICD-10-CM

## 2023-12-04 DIAGNOSIS — I87.8 VENOUS STASIS: ICD-10-CM

## 2023-12-04 DIAGNOSIS — E21.0 PRIMARY HYPERPARATHYROIDISM (H): ICD-10-CM

## 2023-12-04 DIAGNOSIS — L97.919 VENOUS STASIS ULCERS OF BOTH LOWER EXTREMITIES (H): ICD-10-CM

## 2023-12-04 DIAGNOSIS — T45.515A WARFARIN-INDUCED COAGULOPATHY (H): ICD-10-CM

## 2023-12-04 DIAGNOSIS — Z79.4 TYPE 2 DIABETES MELLITUS WITH DIABETIC POLYNEUROPATHY, WITH LONG-TERM CURRENT USE OF INSULIN (H): ICD-10-CM

## 2023-12-04 DIAGNOSIS — D68.32 WARFARIN-INDUCED COAGULOPATHY (H): ICD-10-CM

## 2023-12-04 DIAGNOSIS — I10 ESSENTIAL HYPERTENSION: ICD-10-CM

## 2023-12-04 DIAGNOSIS — M62.81 GENERALIZED MUSCLE WEAKNESS: ICD-10-CM

## 2023-12-04 DIAGNOSIS — N18.31 STAGE 3A CHRONIC KIDNEY DISEASE (H): ICD-10-CM

## 2023-12-04 DIAGNOSIS — E11.621 DIABETIC ULCER OF LEFT HEEL ASSOCIATED WITH TYPE 2 DIABETES MELLITUS, WITH MUSCLE INVOLVEMENT WITHOUT EVIDENCE OF NECROSIS (H): ICD-10-CM

## 2023-12-04 DIAGNOSIS — I83.029 VENOUS STASIS ULCERS OF BOTH LOWER EXTREMITIES (H): ICD-10-CM

## 2023-12-04 DIAGNOSIS — I48.91 ATRIAL FIBRILLATION, UNSPECIFIED TYPE (H): ICD-10-CM

## 2023-12-04 DIAGNOSIS — I48.0 PAROXYSMAL ATRIAL FIBRILLATION (H): Primary | ICD-10-CM

## 2023-12-04 DIAGNOSIS — R53.81 PHYSICAL DECONDITIONING: ICD-10-CM

## 2023-12-04 DIAGNOSIS — I73.9 PAD (PERIPHERAL ARTERY DISEASE) (H): ICD-10-CM

## 2023-12-04 DIAGNOSIS — K59.01 SLOW TRANSIT CONSTIPATION: ICD-10-CM

## 2023-12-04 DIAGNOSIS — F33.1 MAJOR DEPRESSIVE DISORDER, RECURRENT EPISODE, MODERATE (H): ICD-10-CM

## 2023-12-04 DIAGNOSIS — Z98.890 STATUS POST DEBRIDEMENT: ICD-10-CM

## 2023-12-04 DIAGNOSIS — Z79.01 LONG TERM (CURRENT) USE OF ANTICOAGULANTS: ICD-10-CM

## 2023-12-04 DIAGNOSIS — E11.42 TYPE 2 DIABETES MELLITUS WITH DIABETIC POLYNEUROPATHY, WITH LONG-TERM CURRENT USE OF INSULIN (H): ICD-10-CM

## 2023-12-04 DIAGNOSIS — I50.812 CHRONIC RIGHT-SIDED HEART FAILURE (H): ICD-10-CM

## 2023-12-04 DIAGNOSIS — L97.424 DIABETIC ULCER OF LEFT HEEL ASSOCIATED WITH TYPE 2 DIABETES MELLITUS, WITH NECROSIS OF BONE (H): ICD-10-CM

## 2023-12-04 DIAGNOSIS — G47.33 OSA (OBSTRUCTIVE SLEEP APNEA): ICD-10-CM

## 2023-12-04 DIAGNOSIS — G89.29 OTHER CHRONIC PAIN: ICD-10-CM

## 2023-12-04 DIAGNOSIS — E11.621 DIABETIC ULCER OF LEFT HEEL ASSOCIATED WITH TYPE 2 DIABETES MELLITUS, WITH NECROSIS OF BONE (H): ICD-10-CM

## 2023-12-04 DIAGNOSIS — D62 ACUTE BLOOD LOSS ANEMIA: ICD-10-CM

## 2023-12-04 DIAGNOSIS — L03.115 CELLULITIS OF RIGHT LOWER EXTREMITY: ICD-10-CM

## 2023-12-04 DIAGNOSIS — G89.4 CHRONIC PAIN SYNDROME: ICD-10-CM

## 2023-12-04 DIAGNOSIS — E78.5 DYSLIPIDEMIA: ICD-10-CM

## 2023-12-04 DIAGNOSIS — F11.20 OPIOID DEPENDENCE, UNCOMPLICATED (H): ICD-10-CM

## 2023-12-04 DIAGNOSIS — M86.672 OTHER CHRONIC OSTEOMYELITIS OF LEFT FOOT (H): ICD-10-CM

## 2023-12-04 DIAGNOSIS — I83.019 VENOUS STASIS ULCERS OF BOTH LOWER EXTREMITIES (H): ICD-10-CM

## 2023-12-04 DIAGNOSIS — L97.929 VENOUS STASIS ULCERS OF BOTH LOWER EXTREMITIES (H): ICD-10-CM

## 2023-12-04 DIAGNOSIS — M86.9 OSTEOMYELITIS OF LEFT FOOT, UNSPECIFIED TYPE (H): ICD-10-CM

## 2023-12-04 DIAGNOSIS — L97.425 DIABETIC ULCER OF LEFT HEEL ASSOCIATED WITH TYPE 2 DIABETES MELLITUS, WITH MUSCLE INVOLVEMENT WITHOUT EVIDENCE OF NECROSIS (H): ICD-10-CM

## 2023-12-04 DIAGNOSIS — Z89.512 STATUS POST BELOW-KNEE AMPUTATION OF LEFT LOWER EXTREMITY (H): ICD-10-CM

## 2023-12-04 DIAGNOSIS — J45.20 MILD INTERMITTENT ASTHMA WITHOUT COMPLICATION: ICD-10-CM

## 2023-12-04 DIAGNOSIS — J44.9 CHRONIC OBSTRUCTIVE PULMONARY DISEASE, UNSPECIFIED COPD TYPE (H): ICD-10-CM

## 2023-12-04 DIAGNOSIS — L98.9 SKIN LESION: ICD-10-CM

## 2023-12-04 PROCEDURE — 99309 SBSQ NF CARE MODERATE MDM 30: CPT | Performed by: NURSE PRACTITIONER

## 2023-12-04 RX ORDER — METHADONE HYDROCHLORIDE 10 MG/1
10 TABLET ORAL DAILY
Qty: 30 TABLET | Refills: 0 | Status: SHIPPED | OUTPATIENT
Start: 2023-12-04 | End: 2023-12-22

## 2023-12-04 RX ORDER — METHADONE HYDROCHLORIDE 5 MG/1
5 TABLET ORAL 3 TIMES DAILY
Qty: 90 TABLET | Refills: 0 | Status: SHIPPED | OUTPATIENT
Start: 2023-12-04 | End: 2023-12-22

## 2023-12-04 NOTE — LETTER
12/4/2023        RE: Linda Loredo  [address]  [address]  [facility] [address]        M [facility] GERIATRICS    Chief Complaint   Patient presents with     RECHECK     HPI:  Linda Loredo is a 78 year old  (1945), who is being seen today for an episodic care visit at: [facility])([facility]) [[id number]]. Today's concern is: The primary encounter diagnosis was Paroxysmal atrial fibrillation (H). Diagnoses of Warfarin-induced coagulopathy , Long term (current) use of anticoagulants, Skin lesion, Diabetic ulcer of left heel associated with type 2 diabetes mellitus, with necrosis of bone (H), Other chronic osteomyelitis of left foot (H), 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), Cellulitis of right lower extremity, Venous stasis, Acute blood loss anemia, Type 2 diabetes mellitus with diabetic polyneuropathy, with long-term current use of insulin (H), Essential hypertension, Dyslipidemia, PAD (peripheral artery disease) (H24), Atrial fibrillation, unspecified type (H), Stage 3a chronic kidney disease (H), Primary hyperparathyroidism (H24), Chronic obstructive pulmonary disease, unspecified COPD type (H), Mild intermittent asthma without complication, Other chronic pain, Slow transit constipation, Severe obesity (H), BERLIN (obstructive sleep apnea), Physical deconditioning, Generalized muscle weakness, Diabetic ulcer of left heel associated with type 2 diabetes mellitus, with muscle involvement without evidence of necrosis (H), Status post debridement, Osteomyelitis of left foot, unspecified type (H), Opioid dependence, uncomplicated (H), Chronic pain syndrome, Chronic right-sided heart failure (H), and Major depressive disorder, recurrent episode, moderate (H) were also pertinent to this visit.    Met with patient who was found sitting upright in wheelchair. She denies any chest pain, palpitations, shortness of breath, RASHID,  "lightheadedness, dizziness, or cough. Denies any abdominal discomfort. Denies N&V. Denies dysuria or frequency. Reports stools are on softer side despite not using any BM agents. Appetite good. Sleeping well. No complaints of itch, Minimal pain complaints. Rash improving to UE. Redness resolving to RLE. Open wounds present but appear to be slowly resolving with current regimen.     BP Readings from Last 3 Encounters:   12/04/23 (!) 143/80   11/30/23 135/75   11/29/23 (!) 152/74     Wt Readings from Last 5 Encounters:   12/04/23 107.4 kg (236 lb 12.8 oz)   11/30/23 105.1 kg (231 lb 12.8 oz)   11/29/23 103.4 kg (227 lb 14.4 oz)   11/27/23 102.7 kg (226 lb 6.4 oz)   11/24/23 100.7 kg (222 lb)     Allergies, and PMH/PSH reviewed in EPIC today.  REVIEW OF SYSTEMS:  10 point ROS of systems including Constitutional, Eyes, Respiratory, Cardiovascular, Gastroenterology, Genitourinary, Integumentary, Musculoskeletal, Psychiatric were all negative except for pertinent positives noted in my HPI.    Objective:   BP (!) 143/80   Pulse 78   Temp 97.9  F (36.6  C)   Resp 18   Ht 1.676 m (5' 6\")   Wt 107.4 kg (236 lb 12.8 oz)   SpO2 94%   BMI 38.22 kg/m    GENERAL APPEARANCE:  Alert, in no distress, oriented, morbidly obese, cooperative  ENT:  Mouth and posterior oropharynx normal, moist mucous membranes, normal hearing acuity  EYES:  EOM, conjunctivae, lids, pupils and irises normal  NECK:  No adenopathy,masses or thyromegaly  RESP:  respiratory effort and palpation of chest normal, lungs clear to auscultation , no respiratory distress  CV:  Palpation and auscultation of heart done , regular rate and rhythm, no murmur, rub, or gallop, peripheral edema 2+ in RLE  ABDOMEN:  normal bowel sounds, soft, nontender, no hepatosplenomegaly or other masses, no guarding or rebound  M/S:   Slide board transfers. Wheelchair bound  SKIN:  Inspection of skin and subcutaneous tissue baseline, wound healing well, no signs of infection see " photos  NEURO:   Cranial nerves 2-12 are normal tested and grossly at patient's baseline, no purposeful movement in upper and lower extremities  PSYCH:  oriented X 3, normal insight, judgement and memory, affect and mood normal            Most Recent 3 CBC's:  Recent Labs   Lab Test 11/24/23  1012 11/17/23  1033 11/01/23  0613   WBC 7.0 7.8 6.4   HGB 9.9* 10.5* 9.0*   MCV 94 95 96    218 268     Most Recent 3 BMP's:  Recent Labs   Lab Test 11/24/23  1012 11/17/23  1033 11/15/23  0800 11/01/23  0814 11/01/23  0613    141  --   --  140   POTASSIUM 4.5 4.3  --   --  4.4   CHLORIDE 101 101  --   --  100   CO2 28 29  --   --  29   BUN 35.2* 27.9*  --   --  18.0   CR 0.98* 0.93  --   --  0.96*   ANIONGAP 11 11  --   --  11   ZAKIA 10.4* 10.9*  --   --  9.8   * 205* 141*   < > 149*    < > = values in this interval not displayed.     Most Recent 2 LFT's:  Recent Labs   Lab Test 11/17/23  1033 11/01/23  0613   AST 20 19   ALT 11 12   ALKPHOS 90 97   BILITOTAL 0.5 0.2     Most Recent 3 INR's:  Recent Labs   Lab Test 11/24/23  1012 11/17/23  1033 11/15/23  0644   INR 3.50* 1.69* 2.45*     Most Recent TSH and T4:  Recent Labs   Lab Test 11/24/23  1012   TSH 3.46   T4 1.20     Most Recent Hemoglobin A1c:  Recent Labs   Lab Test 09/23/23  0533   A1C 7.0*     Most Recent Anemia Panel:  Recent Labs   Lab Test 11/24/23  1012   WBC 7.0   HGB 9.9*   HCT 31.9*   MCV 94          ASSESSMENT/PLAN:     (R53.81) Physical deconditioning  (primary encounter diagnosis)  (M62.81) Generalized muscle weakness  Comment: Acute on chronic. S/T below diagnosis. Still requiring max assistance for toileting cares along with wound care needs  Plan:   -Continue Physical therapy and Occupational therapy as directed  -SW to remain involved for safe discharge planning needs  -Suspect will need DME needs for discharge planning goals.      (E11.671,  C53.587) Diabetic ulcer of left heel associated with type 2 diabetes mellitus, with  muscle involvement without evidence of necrosis (H)  (Z89.512) 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)  (Z98.890) Status post debridement  (M86.9) Osteomyelitis of left foot, unspecified type (H)  (F11.20) Opioid dependence, uncomplicated (H)  (G89.4) Chronic pain syndrome  (I83.019,  I83.029,  L97.919,  L97.929) Venous stasis ulcers of both lower extremities (H)  (I89.0) Lymphedema  Comment: Chronic. S/p debridement 9/26/23. S/p Left saritha BKA on 10/7/23. S/p tibial/fibular resection and debridement of stump 10/17/23. Followed by vascular. Completed course of meropenem/vancomycin 10/8 for osteomyelitis. Vascular surgery planning to close in the future, monthly clinic visits for wound checks with closure at unspecified time in the future  Plan:   -Monitor pain complaints  -In house wound provider to follow for ongoing needs   -Continue methadone 10mg daily at 1500 and 5mg TID scheduled.   -Tylenol PRN  -Continue triple lumen PICC to RUE as directed. Added daily flush and PRN along with dressing weekly changes. Would recommend removing PICC at this time due to non use, however patient wants to keep due to pending future surgery needs. No surgery date confirmed. Would need to remove if TCU discharge. Would recommend to remove around end of December given 3 month placement.   -BMP, CBC, and INR due 12/8/23  -Follow up with vascular as directed. Scheduled for 12/11/23 via telephone and 1/15/24   -Apply hemp lotion per her request to right heel dry skin BID--keep at bedside and staff to apply accordingly  -Continue current wound cares as directed;  *Wound Care - Left BKA : *Change daily* 1. Cleanse w/Vashe soaked gauze, including sreekanth wound skin, gentle pat dry. 2. Apply skin prep to sreekanth wound skin. 3. Soak Hydrofera Blue with saline and place over wound bed, cover with abd, and wrap with kerlix to secure     (E11.621,  L97.509,  Z79.4) Type 2 diabetes mellitus with foot ulcer, with long-term  current use of insulin (H)  (E66.01) Obesity, Class III, BMI 40-49.9 (morbid obesity) (H)  Comment: Chronic. Last A1c 7% in sept 2023. Goal <9%.   Plan:   -Monitor Blood Glucose QID as directed  -Continue sliding scale insulin as directed TID with meals and HS  -Continue tresiba 14 units at HS  -Recommend hgb A1c due Dec 2023.   -BMP, CBC, and INR due 12/8/23         (K59.01) Slow transit constipation  Comment: Acute on chronic.    Plan:   -Monitor BM patterns  -miralax 17gm BID PRN  -Imodium 2mg QID PRN added for diarrheal concerns. Use if needed  -Continue senna S 2 tabs BID PRN  -Zofran PRN  -Bisacodyl daily PRN  -BMP, CBC, and INR due 12/8/23     (I48.0) Paroxysmal atrial fibrillation (H)  (Z79.01) Long term (current) use of anticoagulants  (D68.32,  T45.515A) Warfarin-induced coagulopathy   (I10) Essential hypertension with goal blood pressure less than 140/90  (E78.2) Mixed hyperlipidemia  (N18.30) Stage 3 chronic kidney disease, unspecified whether stage 3a or 3b CKD (H)  (I50.812) Chronic right-sided heart failure (H)  Comment: Chronic. Baseline Creatinine~ 1-1.2. Based on JNC-8 goals,  patients age of 78 year old, presence of diabetes or CKD, and goals of care goal BP is  <140/90 mm Hg. Patient is stable with current plan of care and routine assessment..Holding lisinopril per chart review per patient wishes. INR goal 2-3.INR 12/1/23 via fingerstick was 3.40  Plan:   -Monitor for worsening s/symptoms of concerns  -Monitor BP and HR as directed  -Continue asa 81mg daily  -Continue atorvastatin 40mg daily  -Continue coumadin 2mg daily  -Monitor weights as directed. Baseline weight around 222-224# on admission but now weight noted to be 235#. If weight continues to trend upward, would recommend diuretic dose adjustment   -Continue Torsemide 15mg daily. HOLD if SBP<100  -BMP, CBC, and INR due 12/8/23          (R62.7) Failure to thrive in adult  (F41.9) Anxiety  (F33.1) Major depressive disorder, recurrent  episode, moderate (H)  Comment: Chronic. S/T current health condition  Plan:   -Monitor mood and behaviors  -Monitor for worsening s/symptoms of concerns  -Dietician to remain involved  -ACP offered while on TCU and declined at this time  -Monitor for changes in mobility, eating and sleeping patterns  -BMP, CBC, and INR due 12/8/23     (R60.0) Edema of right extremity  (L53.9) Redness  (L03.115) Cellulitis of right lower extremity  Comment: Acute. Noted increased redness to RLE with warmth and pain soon after admission. Currently on coumadin. US negative for DVT risks. Now open area noted to back of calf. Due to increased redness with wound present is currently being treated for suspected cellulitis. Unable to start prednisone given allergy risk. Suspect edema may also be contributing to wound concerns to RLE.   Plan:  -Lymphedema therapy to assess and assist with edema control---currently on hold for now per request  -Continue wound care to RLE for now:   *Cleanse with normal saline. Apply bactroban to open areas BID. Leave NANCY  -Monitor for worsening s/symptoms of concerns  -Continue benadryl every 6 hours PRN  -Continue hydrocortisone cream to apply to back, arms and right leg redness BID and BID PRN  -BMP, CBC, and INR due 12/8/23      (J44.9) Chronic obstructive pulmonary disease, unspecified COPD type (H)  (G47.33) BERLIN (obstructive sleep apnea)  Comment: Chronic. Non compliant with CPAP use.   Plan:   -Monitor respiratory status  -Would recommend CPAP restart if compliant  -BMP, CBC, and INR due 12/8/23     (L30.9) Dermatitis  Comment: Acute on chronic. History of allergic reaction to bilateral hands per chart review.   Plan:   -Monitor for worsening s/symptoms of concerns  -Benadryl PRN  -Hydrocortisone 2.5% topically BID to back, arms and redness to RLE BID and BID PRN  -BMP, CBC, and INR due 12/8/23     (E21.0) Primary hyperparathyroidism (H24)  Comment: Acute. Noted on chart review. Last TSH level stable  of 3.46   Plan:   -Not currently on any supplementation.   -Monitor for worsening s/symptoms of concerns  -BMP, CBC, and INR due 12/8/23     (L89.320) Pressure injury of left buttock  Comment: Acute. History of reports of skin breakdown in the past to buttock region, however these areas have healed PTA. Now developed upper buttock pressure injuries. Suspect due to friction S/T slide board, however patient does not agree. She believes this is from stool being left on skin without proper hygiene clean up. I also educated use of slide board which I suspect is cause, however she declines.   Plan:  -Monitor skin for further breakdown  -In house skin/wound provider to follow  -Continue wound treatment course as directed:  *Wound care to buttock open wounds. Cleanse with normal saline. Pat dry, use barrier skin to surrounding skin, then cover w/sacral mepilex.  -Monitor for worsening s/symptoms of concerns  -BMP, CBC, and INR due 12/8/23     Electronically signed by:  Dr. Haylie Rowe DNP, APRN, FNP-C, WCS-C, EDS-C             Sincerely,        Haylie Rowe, MOE CNP

## 2023-12-07 ENCOUNTER — LAB REQUISITION (OUTPATIENT)
Dept: LAB | Facility: CLINIC | Age: 78
End: 2023-12-07
Payer: COMMERCIAL

## 2023-12-07 VITALS
SYSTOLIC BLOOD PRESSURE: 139 MMHG | WEIGHT: 230 LBS | TEMPERATURE: 97.6 F | HEART RATE: 93 BPM | BODY MASS INDEX: 36.96 KG/M2 | HEIGHT: 66 IN | RESPIRATION RATE: 17 BRPM | DIASTOLIC BLOOD PRESSURE: 87 MMHG | OXYGEN SATURATION: 100 %

## 2023-12-07 DIAGNOSIS — I48.0 PAROXYSMAL ATRIAL FIBRILLATION (H): ICD-10-CM

## 2023-12-07 DIAGNOSIS — I15.1 HYPERTENSION SECONDARY TO OTHER RENAL DISORDERS: ICD-10-CM

## 2023-12-07 DIAGNOSIS — D64.9 ANEMIA, UNSPECIFIED: ICD-10-CM

## 2023-12-07 DIAGNOSIS — E11.9 TYPE 2 DIABETES MELLITUS WITHOUT COMPLICATIONS (H): ICD-10-CM

## 2023-12-07 DIAGNOSIS — I48.91 UNSPECIFIED ATRIAL FIBRILLATION (H): ICD-10-CM

## 2023-12-07 DIAGNOSIS — N18.30 CHRONIC KIDNEY DISEASE, STAGE 3 UNSPECIFIED (H): ICD-10-CM

## 2023-12-07 NOTE — PROGRESS NOTES
"HPI:    Linda Loredo is a 78 year old  (1945), who is being seen today for an episodic care visit at [facility]. Today's concern: lab recheck of BMP, CBC with diff, INR and hgb A1c.       SUBJECTIVE/OBJECTIVE: No C/O's of pain reported.  A & O x 3, NAD. Lungs CTA, non labored. RRR, S1/S2 w/o murmur,gallop or rub.  2-3 edema present to RLE. See photos for reference of wounds. Abdomen soft, nontender, +BT'S. No focal neurological deficits. History of completing keflex with good improvement, but now redness persists to anterior portion of calf. Patient reports redness comes and goes daily. Redness was marked with black marker, Offered to start antibiotic course now, which she is accepting of this today. In agreement to this plan below.  Unable to start prednisone given allergy risk. Suspect edema may also be contributing to wound concerns to RLE.     /87   Pulse 93   Temp 97.6  F (36.4  C)   Resp 17   Ht 1.676 m (5' 6\")   Wt 104.3 kg (230 lb)   SpO2 100%   BMI 37.12 kg/m                                          LABS: today 12/8/23:    Lab Results   Component Value Date    WBC 7.5 12/08/2023    WBC 9.4 02/22/2008     Lab Results   Component Value Date    RBC 3.43 12/08/2023    RBC 4.19 02/22/2008     Lab Results   Component Value Date    HGB 9.6 12/08/2023    HGB 12.6 02/22/2008     Lab Results   Component Value Date    HCT 31.1 12/08/2023    HCT 35.6 02/22/2008       INR   Date Value Ref Range Status   12/08/2023 2.73 (H) 0.85 - 1.15 Final   06/16/2006 0.98 0.86 - 1.14 Final      Hemoglobin A1C   Date Value Ref Range Status   12/08/2023 7.6 (H) <5.7 % Final     Comment:     Normal <5.7%   Prediabetes 5.7-6.4%    Diabetes 6.5% or higher     Note: Adopted from ADA consensus guidelines.   12/19/2007 10.0 (H) 4.3 - 6.0 % Final     Last Comprehensive Metabolic Panel:  Lab Results   Component Value Date     12/08/2023    POTASSIUM 4.6 12/08/2023    CHLORIDE 98 " 12/08/2023    CO2 30 (H) 12/08/2023    ANIONGAP 9 12/08/2023     (H) 12/08/2023    BUN 32.4 (H) 12/08/2023    CR 0.96 (H) 12/08/2023    GFRESTIMATED 60 (L) 12/08/2023    ZAKIA 10.1 12/08/2023           ASSESSMENT/PLAN:  (E11.621,  L97.509,  Z79.4) Type 2 diabetes mellitus with foot ulcer, with long-term current use of insulin (H)  (E66.01) Obesity, Class III, BMI 40-49.9 (morbid obesity) (H)  Comment: Chronic. Last A1c 7% in sept 2023. Results today 12/8/23 were 7.6%. Goal <9%.   Plan:   -Monitor Blood Glucose QID as directed  -Continue sliding scale insulin as directed TID with meals and HS  -Continue tresiba 14 units at HS  -Trend labs periodically while on TCU     (I48.0) Paroxysmal atrial fibrillation (H)  (Z79.01) Long term (current) use of anticoagulants  (D68.32,  T45.515A) Warfarin-induced coagulopathy   (I10) Essential hypertension with goal blood pressure less than 140/90  (E78.2) Mixed hyperlipidemia  (N18.30) Stage 3 chronic kidney disease, unspecified whether stage 3a or 3b CKD (H)  (I50.812) Chronic right-sided heart failure (H)  Comment: Chronic. Baseline Creatinine~ 1-1.2. Based on JNC-8 goals,  patients age of 78 year old, presence of diabetes or CKD, and goals of care goal BP is  <140/90 mm Hg. Patient is stable with current plan of care and routine assessment..Holding lisinopril per chart review per patient wishes. INR goal 2-3.INR 12/1/23 via fingerstick was 3.40. INR today 12/8/23-2.73  Plan:   -Monitor for worsening s/symptoms of concerns  -Monitor BP and HR as directed  -Continue asa 81mg daily  -Continue atorvastatin 40mg daily  -Continue coumadin 2mg daily  -Monitor weights as directed. Baseline weight around 222-224# on admission but now weight noted to be 235#. If weight continues to trend upward, would recommend diuretic dose adjustment   -Increase torsemide to 20mg daily. HOLD if SBP<100  -Trend labs periodically while on TCU  -INR due- 12/15/23    (R60.0) Edema of right  extremity  (L53.9) Redness  (L03.115) Cellulitis of right lower extremity  Comment: Acute on chronic. Noted increased redness to RLE with warmth and pain soon after admission. Currently on coumadin. US negative for DVT risks. 4 areas areas are present to right leg. 3 anterior and 1 posterior (see photo above for reference). History of completing keflex with good improvement, but now redness persists to anterior portion of calf. Patient reports redness comes and goes daily. Redness was marked with black marker, Offered to start antibiotic course now, however patient wants to wait and monitor for now. I advised her that if redness persists or worsens, then to notify staff and we will start antibiotic treatment right away. In agreement to this plan below.  Unable to start prednisone given allergy risk. Suspect edema may also be contributing to wound concerns to RLE.   Plan:  -Lymphedema therapy to assess and assist with edema control---currently on hold for now per request  -Continue wound care to RLE for now:   *Cleanse with normal saline. Apply bactroban to open areas BID. Leave NANCY. Offered more intervention with dressings, however she declines at this time.   -Start clindamycin x 10 day course.   -Monitor for worsening s/symptoms of concerns  -Continue benadryl every 6 hours PRN  -Continue hydrocortisone cream to apply to back, arms and right leg redness BID and BID PRN  -BMP, CBC, and INR due 12/8/23    (E11.621,  L97.425) Diabetic ulcer of left heel associated with type 2 diabetes mellitus, with muscle involvement without evidence of necrosis (H)  (Z89.512) 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)  (Z98.890) Status post debridement  (M86.9) Osteomyelitis of left foot, unspecified type (H)  (F11.20) Opioid dependence, uncomplicated (H)  (G89.4) Chronic pain syndrome  (I83.019,  I83.029,  L97.919,  L97.929) Venous stasis ulcers of both lower extremities (H)  (I89.0) Lymphedema  Comment:  Chronic. S/p debridement 9/26/23. S/p Left saritha BKA on 10/7/23. S/p tibial/fibular resection and debridement of stump 10/17/23. Followed by vascular. Completed course of meropenem/vancomycin 10/8 for osteomyelitis. Vascular surgery planning to close in the future, monthly clinic visits for wound checks with closure at unspecified time in the future  Plan:   -Monitor pain complaints  -In house wound provider to follow for ongoing needs   -Continue methadone 10mg daily at 1500 and 5mg TID scheduled.   -Tylenol PRN  -Continue triple lumen PICC to RUE as directed. Added daily flush and PRN along with dressing weekly changes. Would recommend removing PICC at this time due to non use, however patient wants to keep due to pending future surgery needs. No surgery date confirmed. Would need to remove if TCU discharge. Would recommend to remove around end of December given 3 month placement.   -BMP, CBC, and INR due 12/8/23  -Follow up with vascular as directed. Scheduled for 12/11/23 via telephone and 1/15/24   -Apply hemp lotion per her request to right heel dry skin BID--keep at bedside and staff to apply accordingly  -Continue current wound cares as directed;  *Wound Care - Left BKA : *Change daily* 1. Cleanse w/Vashe soaked gauze, including sreekanth wound skin, gentle pat dry. 2. Apply skin prep to sreekanth wound skin. 3. Soak Hydrofera Blue with saline and place over wound bed, cover with abd, and wrap with kerlix to secure    Electronically Signed by:  Dr. Haylie Rowe DNP, APRN, FNP-C, WCS-C, EDS-C

## 2023-12-08 ENCOUNTER — TRANSITIONAL CARE UNIT VISIT (OUTPATIENT)
Dept: GERIATRICS | Facility: CLINIC | Age: 78
End: 2023-12-08
Payer: COMMERCIAL

## 2023-12-08 DIAGNOSIS — N18.31 STAGE 3A CHRONIC KIDNEY DISEASE (H): ICD-10-CM

## 2023-12-08 DIAGNOSIS — Z89.512 HX OF BKA, LEFT (H): ICD-10-CM

## 2023-12-08 DIAGNOSIS — I48.0 PAROXYSMAL ATRIAL FIBRILLATION (H): Primary | ICD-10-CM

## 2023-12-08 DIAGNOSIS — E11.42 TYPE 2 DIABETES MELLITUS WITH DIABETIC POLYNEUROPATHY, WITH LONG-TERM CURRENT USE OF INSULIN (H): ICD-10-CM

## 2023-12-08 DIAGNOSIS — Z79.01 LONG TERM (CURRENT) USE OF ANTICOAGULANTS: ICD-10-CM

## 2023-12-08 DIAGNOSIS — T45.515A WARFARIN-INDUCED COAGULOPATHY (H): ICD-10-CM

## 2023-12-08 DIAGNOSIS — Z89.512 STATUS POST BELOW-KNEE AMPUTATION OF LEFT LOWER EXTREMITY (H): ICD-10-CM

## 2023-12-08 DIAGNOSIS — I50.812 CHRONIC RIGHT-SIDED HEART FAILURE (H): ICD-10-CM

## 2023-12-08 DIAGNOSIS — I89.0 LYMPHEDEMA: ICD-10-CM

## 2023-12-08 DIAGNOSIS — D68.32 WARFARIN-INDUCED COAGULOPATHY (H): ICD-10-CM

## 2023-12-08 DIAGNOSIS — E11.621 DIABETIC ULCER OF LEFT HEEL ASSOCIATED WITH TYPE 2 DIABETES MELLITUS, WITH NECROSIS OF BONE (H): ICD-10-CM

## 2023-12-08 DIAGNOSIS — Z98.890 STATUS POST DEBRIDEMENT: ICD-10-CM

## 2023-12-08 DIAGNOSIS — M86.672 OTHER CHRONIC OSTEOMYELITIS OF LEFT FOOT (H): ICD-10-CM

## 2023-12-08 DIAGNOSIS — L53.9 REDNESS: ICD-10-CM

## 2023-12-08 DIAGNOSIS — L97.424 DIABETIC ULCER OF LEFT HEEL ASSOCIATED WITH TYPE 2 DIABETES MELLITUS, WITH NECROSIS OF BONE (H): ICD-10-CM

## 2023-12-08 DIAGNOSIS — I87.8 VENOUS STASIS: ICD-10-CM

## 2023-12-08 DIAGNOSIS — Z45.2 PICC (PERIPHERALLY INSERTED CENTRAL CATHETER) FLUSH: ICD-10-CM

## 2023-12-08 DIAGNOSIS — I10 ESSENTIAL HYPERTENSION WITH GOAL BLOOD PRESSURE LESS THAN 140/90: ICD-10-CM

## 2023-12-08 DIAGNOSIS — E66.01 SEVERE OBESITY (H): ICD-10-CM

## 2023-12-08 DIAGNOSIS — F11.20 OPIOID DEPENDENCE, UNCOMPLICATED (H): ICD-10-CM

## 2023-12-08 DIAGNOSIS — I10 ESSENTIAL HYPERTENSION: ICD-10-CM

## 2023-12-08 DIAGNOSIS — Z79.4 TYPE 2 DIABETES MELLITUS WITH DIABETIC POLYNEUROPATHY, WITH LONG-TERM CURRENT USE OF INSULIN (H): ICD-10-CM

## 2023-12-08 DIAGNOSIS — E78.5 DYSLIPIDEMIA: ICD-10-CM

## 2023-12-08 DIAGNOSIS — L03.115 CELLULITIS OF RIGHT LOWER EXTREMITY: ICD-10-CM

## 2023-12-08 LAB
ANION GAP SERPL CALCULATED.3IONS-SCNC: 9 MMOL/L (ref 7–15)
BASOPHILS # BLD AUTO: 0 10E3/UL (ref 0–0.2)
BASOPHILS NFR BLD AUTO: 0 %
BUN SERPL-MCNC: 32.4 MG/DL (ref 8–23)
CALCIUM SERPL-MCNC: 10.1 MG/DL (ref 8.8–10.2)
CHLORIDE SERPL-SCNC: 98 MMOL/L (ref 98–107)
CREAT SERPL-MCNC: 0.96 MG/DL (ref 0.51–0.95)
DEPRECATED HCO3 PLAS-SCNC: 30 MMOL/L (ref 22–29)
EGFRCR SERPLBLD CKD-EPI 2021: 60 ML/MIN/1.73M2
EOSINOPHIL # BLD AUTO: 0.2 10E3/UL (ref 0–0.7)
EOSINOPHIL NFR BLD AUTO: 3 %
ERYTHROCYTE [DISTWIDTH] IN BLOOD BY AUTOMATED COUNT: 13.9 % (ref 10–15)
GLUCOSE SERPL-MCNC: 185 MG/DL (ref 70–99)
HBA1C MFR BLD: 7.6 %
HCT VFR BLD AUTO: 31.1 % (ref 35–47)
HGB BLD-MCNC: 9.6 G/DL (ref 11.7–15.7)
IMM GRANULOCYTES # BLD: 0 10E3/UL
IMM GRANULOCYTES NFR BLD: 0 %
INR PPP: 2.73 (ref 0.85–1.15)
LYMPHOCYTES # BLD AUTO: 1.3 10E3/UL (ref 0.8–5.3)
LYMPHOCYTES NFR BLD AUTO: 17 %
MCH RBC QN AUTO: 28 PG (ref 26.5–33)
MCHC RBC AUTO-ENTMCNC: 30.9 G/DL (ref 31.5–36.5)
MCV RBC AUTO: 91 FL (ref 78–100)
MONOCYTES # BLD AUTO: 0.6 10E3/UL (ref 0–1.3)
MONOCYTES NFR BLD AUTO: 7 %
NEUTROPHILS # BLD AUTO: 5.4 10E3/UL (ref 1.6–8.3)
NEUTROPHILS NFR BLD AUTO: 73 %
NRBC # BLD AUTO: 0 10E3/UL
NRBC BLD AUTO-RTO: 0 /100
PLATELET # BLD AUTO: 198 10E3/UL (ref 150–450)
POTASSIUM SERPL-SCNC: 4.6 MMOL/L (ref 3.4–5.3)
RBC # BLD AUTO: 3.43 10E6/UL (ref 3.8–5.2)
SODIUM SERPL-SCNC: 137 MMOL/L (ref 135–145)
WBC # BLD AUTO: 7.5 10E3/UL (ref 4–11)

## 2023-12-08 PROCEDURE — 80048 BASIC METABOLIC PNL TOTAL CA: CPT | Performed by: NURSE PRACTITIONER

## 2023-12-08 PROCEDURE — 83036 HEMOGLOBIN GLYCOSYLATED A1C: CPT | Performed by: NURSE PRACTITIONER

## 2023-12-08 PROCEDURE — 36415 COLL VENOUS BLD VENIPUNCTURE: CPT | Performed by: NURSE PRACTITIONER

## 2023-12-08 PROCEDURE — 85610 PROTHROMBIN TIME: CPT | Performed by: NURSE PRACTITIONER

## 2023-12-08 PROCEDURE — 99309 SBSQ NF CARE MODERATE MDM 30: CPT | Performed by: NURSE PRACTITIONER

## 2023-12-08 PROCEDURE — 85004 AUTOMATED DIFF WBC COUNT: CPT | Performed by: NURSE PRACTITIONER

## 2023-12-08 RX ORDER — CLINDAMYCIN HCL 300 MG
300 CAPSULE ORAL 3 TIMES DAILY
Qty: 30 CAPSULE | Refills: 0 | Status: SHIPPED | OUTPATIENT
Start: 2023-12-08 | End: 2023-12-18

## 2023-12-08 RX ORDER — TORSEMIDE 10 MG/1
20 TABLET ORAL DAILY
Qty: 60 TABLET | Refills: 11 | Status: SHIPPED | OUTPATIENT
Start: 2023-12-08 | End: 2024-01-08

## 2023-12-08 NOTE — LETTER
"    12/8/2023        RE: Linda Loredo  [address]  [address]  [facility] [address]        HPI:    Linda Loredo is a 78 year old  (1945), who is being seen today for an episodic care visit at [facility]. Today's concern: lab recheck of BMP, CBC with diff, INR and hgb A1c.       SUBJECTIVE/OBJECTIVE: No C/O's of pain reported.  A & O x 3, NAD. Lungs CTA, non labored. RRR, S1/S2 w/o murmur,gallop or rub.  2-3 edema present to RLE. See photos for reference of wounds. Abdomen soft, nontender, +BT'S. No focal neurological deficits. History of completing keflex with good improvement, but now redness persists to anterior portion of calf. Patient reports redness comes and goes daily. Redness was marked with black marker, Offered to start antibiotic course now, which she is accepting of this today. In agreement to this plan below.  Unable to start prednisone given allergy risk. Suspect edema may also be contributing to wound concerns to RLE.     /87   Pulse 93   Temp 97.6  F (36.4  C)   Resp 17   Ht 1.676 m (5' 6\")   Wt 104.3 kg (230 lb)   SpO2 100%   BMI 37.12 kg/m                                          LABS: today 12/8/23:    Lab Results   Component Value Date    WBC 7.5 12/08/2023    WBC 9.4 02/22/2008     Lab Results   Component Value Date    RBC 3.43 12/08/2023    RBC 4.19 02/22/2008     Lab Results   Component Value Date    HGB 9.6 12/08/2023    HGB 12.6 02/22/2008     Lab Results   Component Value Date    HCT 31.1 12/08/2023    HCT 35.6 02/22/2008       INR   Date Value Ref Range Status   12/08/2023 2.73 (H) 0.85 - 1.15 Final   06/16/2006 0.98 0.86 - 1.14 Final      Hemoglobin A1C   Date Value Ref Range Status   12/08/2023 7.6 (H) <5.7 % Final     Comment:     Normal <5.7%   Prediabetes 5.7-6.4%    Diabetes 6.5% or higher     Note: Adopted from ADA consensus guidelines.   12/19/2007 10.0 (H) 4.3 - 6.0 % Final     Last Comprehensive " Metabolic Panel:  Lab Results   Component Value Date     12/08/2023    POTASSIUM 4.6 12/08/2023    CHLORIDE 98 12/08/2023    CO2 30 (H) 12/08/2023    ANIONGAP 9 12/08/2023     (H) 12/08/2023    BUN 32.4 (H) 12/08/2023    CR 0.96 (H) 12/08/2023    GFRESTIMATED 60 (L) 12/08/2023    ZAKIA 10.1 12/08/2023           ASSESSMENT/PLAN:  (E11.621,  L97.509,  Z79.4) Type 2 diabetes mellitus with foot ulcer, with long-term current use of insulin (H)  (E66.01) Obesity, Class III, BMI 40-49.9 (morbid obesity) (H)  Comment: Chronic. Last A1c 7% in sept 2023. Results today 12/8/23 were 7.6%. Goal <9%.   Plan:   -Monitor Blood Glucose QID as directed  -Continue sliding scale insulin as directed TID with meals and HS  -Continue tresiba 14 units at HS  -Trend labs periodically while on TCU     (I48.0) Paroxysmal atrial fibrillation (H)  (Z79.01) Long term (current) use of anticoagulants  (D68.32,  T45.515A) Warfarin-induced coagulopathy   (I10) Essential hypertension with goal blood pressure less than 140/90  (E78.2) Mixed hyperlipidemia  (N18.30) Stage 3 chronic kidney disease, unspecified whether stage 3a or 3b CKD (H)  (I50.812) Chronic right-sided heart failure (H)  Comment: Chronic. Baseline Creatinine~ 1-1.2. Based on JNC-8 goals,  patients age of 78 year old, presence of diabetes or CKD, and goals of care goal BP is  <140/90 mm Hg. Patient is stable with current plan of care and routine assessment..Holding lisinopril per chart review per patient wishes. INR goal 2-3.INR 12/1/23 via fingerstick was 3.40. INR today 12/8/23-2.73  Plan:   -Monitor for worsening s/symptoms of concerns  -Monitor BP and HR as directed  -Continue asa 81mg daily  -Continue atorvastatin 40mg daily  -Continue coumadin 2mg daily  -Monitor weights as directed. Baseline weight around 222-224# on admission but now weight noted to be 235#. If weight continues to trend upward, would recommend diuretic dose adjustment   -Increase torsemide to 20mg  daily. HOLD if SBP<100  -Trend labs periodically while on TCU  -INR due- 12/15/23    (R60.0) Edema of right extremity  (L53.9) Redness  (L03.115) Cellulitis of right lower extremity  Comment: Acute on chronic. Noted increased redness to RLE with warmth and pain soon after admission. Currently on coumadin. US negative for DVT risks. 4 areas areas are present to right leg. 3 anterior and 1 posterior (see photo above for reference). History of completing keflex with good improvement, but now redness persists to anterior portion of calf. Patient reports redness comes and goes daily. Redness was marked with black marker, Offered to start antibiotic course now, however patient wants to wait and monitor for now. I advised her that if redness persists or worsens, then to notify staff and we will start antibiotic treatment right away. In agreement to this plan below.  Unable to start prednisone given allergy risk. Suspect edema may also be contributing to wound concerns to RLE.   Plan:  -Lymphedema therapy to assess and assist with edema control---currently on hold for now per request  -Continue wound care to RLE for now:   *Cleanse with normal saline. Apply bactroban to open areas BID. Leave NANCY. Offered more intervention with dressings, however she declines at this time.   -Start clindamycin x 10 day course.   -Monitor for worsening s/symptoms of concerns  -Continue benadryl every 6 hours PRN  -Continue hydrocortisone cream to apply to back, arms and right leg redness BID and BID PRN  -BMP, CBC, and INR due 12/8/23    (E11.621,  L97.425) Diabetic ulcer of left heel associated with type 2 diabetes mellitus, with muscle involvement without evidence of necrosis (H)  (Z89.512) 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)  (Z98.890) Status post debridement  (M86.9) Osteomyelitis of left foot, unspecified type (H)  (F11.20) Opioid dependence, uncomplicated (H)  (G89.4) Chronic pain syndrome  (I83.019,  I83.029,   L97.919,  L97.929) Venous stasis ulcers of both lower extremities (H)  (I89.0) Lymphedema  Comment: Chronic. S/p debridement 9/26/23. S/p Left saritha BKA on 10/7/23. S/p tibial/fibular resection and debridement of stump 10/17/23. Followed by vascular. Completed course of meropenem/vancomycin 10/8 for osteomyelitis. Vascular surgery planning to close in the future, monthly clinic visits for wound checks with closure at unspecified time in the future  Plan:   -Monitor pain complaints  -In house wound provider to follow for ongoing needs   -Continue methadone 10mg daily at 1500 and 5mg TID scheduled.   -Tylenol PRN  -Continue triple lumen PICC to RUE as directed. Added daily flush and PRN along with dressing weekly changes. Would recommend removing PICC at this time due to non use, however patient wants to keep due to pending future surgery needs. No surgery date confirmed. Would need to remove if TCU discharge. Would recommend to remove around end of December given 3 month placement.   -BMP, CBC, and INR due 12/8/23  -Follow up with vascular as directed. Scheduled for 12/11/23 via telephone and 1/15/24   -Apply hemp lotion per her request to right heel dry skin BID--keep at bedside and staff to apply accordingly  -Continue current wound cares as directed;  *Wound Care - Left BKA : *Change daily* 1. Cleanse w/Vashe soaked gauze, including sreekanth wound skin, gentle pat dry. 2. Apply skin prep to sreekanth wound skin. 3. Soak Hydrofera Blue with saline and place over wound bed, cover with abd, and wrap with kerlix to secure    Electronically Signed by:  Dr. Haylie Rowe DNP, APRN, FNP-C, WCS-C, EDS-C            Sincerely,        Haylie Rowe, APRN CNP

## 2023-12-11 ENCOUNTER — TRANSITIONAL CARE UNIT VISIT (OUTPATIENT)
Dept: GERIATRICS | Facility: CLINIC | Age: 78
End: 2023-12-11
Payer: COMMERCIAL

## 2023-12-11 ENCOUNTER — VIRTUAL VISIT (OUTPATIENT)
Dept: OTHER | Facility: CLINIC | Age: 78
End: 2023-12-11
Payer: COMMERCIAL

## 2023-12-11 ENCOUNTER — TELEPHONE (OUTPATIENT)
Dept: OTHER | Facility: CLINIC | Age: 78
End: 2023-12-11

## 2023-12-11 VITALS
RESPIRATION RATE: 17 BRPM | SYSTOLIC BLOOD PRESSURE: 144 MMHG | WEIGHT: 236.4 LBS | TEMPERATURE: 97.7 F | HEIGHT: 66 IN | BODY MASS INDEX: 37.99 KG/M2 | DIASTOLIC BLOOD PRESSURE: 64 MMHG | HEART RATE: 70 BPM | OXYGEN SATURATION: 98 %

## 2023-12-11 DIAGNOSIS — Z79.01 LONG TERM (CURRENT) USE OF ANTICOAGULANTS: ICD-10-CM

## 2023-12-11 DIAGNOSIS — T45.515A WARFARIN-INDUCED COAGULOPATHY (H): ICD-10-CM

## 2023-12-11 DIAGNOSIS — F11.20 OPIOID DEPENDENCE, UNCOMPLICATED (H): ICD-10-CM

## 2023-12-11 DIAGNOSIS — L98.9 SKIN LESION: ICD-10-CM

## 2023-12-11 DIAGNOSIS — E11.621 DIABETIC ULCER OF LEFT HEEL ASSOCIATED WITH TYPE 2 DIABETES MELLITUS, WITH NECROSIS OF BONE (H): ICD-10-CM

## 2023-12-11 DIAGNOSIS — I10 ESSENTIAL HYPERTENSION: ICD-10-CM

## 2023-12-11 DIAGNOSIS — D62 ACUTE BLOOD LOSS ANEMIA: ICD-10-CM

## 2023-12-11 DIAGNOSIS — N18.31 STAGE 3A CHRONIC KIDNEY DISEASE (H): ICD-10-CM

## 2023-12-11 DIAGNOSIS — Z89.512 STATUS POST BELOW-KNEE AMPUTATION OF LEFT LOWER EXTREMITY (H): ICD-10-CM

## 2023-12-11 DIAGNOSIS — Z89.512 HX OF BKA, LEFT (H): ICD-10-CM

## 2023-12-11 DIAGNOSIS — E78.5 DYSLIPIDEMIA: ICD-10-CM

## 2023-12-11 DIAGNOSIS — M86.672 OTHER CHRONIC OSTEOMYELITIS OF LEFT FOOT (H): ICD-10-CM

## 2023-12-11 DIAGNOSIS — E66.01 SEVERE OBESITY (H): ICD-10-CM

## 2023-12-11 DIAGNOSIS — I48.0 PAROXYSMAL ATRIAL FIBRILLATION (H): Primary | ICD-10-CM

## 2023-12-11 DIAGNOSIS — L03.115 CELLULITIS OF RIGHT LOWER EXTREMITY: ICD-10-CM

## 2023-12-11 DIAGNOSIS — D68.32 WARFARIN-INDUCED COAGULOPATHY (H): ICD-10-CM

## 2023-12-11 DIAGNOSIS — L97.424 DIABETIC ULCER OF LEFT HEEL ASSOCIATED WITH TYPE 2 DIABETES MELLITUS, WITH NECROSIS OF BONE (H): ICD-10-CM

## 2023-12-11 DIAGNOSIS — I50.812 CHRONIC RIGHT-SIDED HEART FAILURE (H): ICD-10-CM

## 2023-12-11 DIAGNOSIS — Z98.890 STATUS POST DEBRIDEMENT: ICD-10-CM

## 2023-12-11 DIAGNOSIS — E11.42 TYPE 2 DIABETES MELLITUS WITH DIABETIC POLYNEUROPATHY, WITH LONG-TERM CURRENT USE OF INSULIN (H): ICD-10-CM

## 2023-12-11 DIAGNOSIS — I48.91 ATRIAL FIBRILLATION, UNSPECIFIED TYPE (H): ICD-10-CM

## 2023-12-11 DIAGNOSIS — T14.8XXA OPEN WOUND: Primary | ICD-10-CM

## 2023-12-11 DIAGNOSIS — I87.8 VENOUS STASIS: ICD-10-CM

## 2023-12-11 DIAGNOSIS — I10 ESSENTIAL HYPERTENSION WITH GOAL BLOOD PRESSURE LESS THAN 140/90: ICD-10-CM

## 2023-12-11 DIAGNOSIS — Z79.4 TYPE 2 DIABETES MELLITUS WITH DIABETIC POLYNEUROPATHY, WITH LONG-TERM CURRENT USE OF INSULIN (H): ICD-10-CM

## 2023-12-11 DIAGNOSIS — I89.0 LYMPHEDEMA: ICD-10-CM

## 2023-12-11 DIAGNOSIS — L53.9 REDNESS: ICD-10-CM

## 2023-12-11 DIAGNOSIS — I73.9 PAD (PERIPHERAL ARTERY DISEASE) (H): ICD-10-CM

## 2023-12-11 PROCEDURE — 99309 SBSQ NF CARE MODERATE MDM 30: CPT | Performed by: NURSE PRACTITIONER

## 2023-12-11 PROCEDURE — 99024 POSTOP FOLLOW-UP VISIT: CPT | Mod: 95

## 2023-12-11 NOTE — PROGRESS NOTES
Pat is a 78 year old who is being evaluated via a billable telephone visit.      What phone number would you like to be contacted at?   [phone]        How would you like to obtain your AVS? Ana Stacy MA

## 2023-12-11 NOTE — PROGRESS NOTES
Margaret Loredo is a 78 year old female who underwent a first stage BKA with Dr. Otto, however had significant breakdown of skin around the BKA resulting in discontinuation of wound vac. Discharged to [facility].     Today her wound is significantly improved, minimal to no skin breakdown around BKA wound per pictures.     Discussed with Dr. Otto and Margaret, we will proceed to skin graft BKA stump, will need wound vac. One of our schedulers will reach out to schedule    Vanita Carter CNP    Total time: 15 minutes

## 2023-12-11 NOTE — PROGRESS NOTES
[facility] GERIATRICS    Chief Complaint   Patient presents with    RECHECK     HPI:  Linda Loredo is a 78 year old  (1945), who is being seen today for an episodic care visit at: [facility] ([facility]) [[id number]]. Today's concern is: The primary encounter diagnosis was Paroxysmal atrial fibrillation (H). Diagnoses of Warfarin-induced coagulopathy , Long term (current) use of anticoagulants, Essential hypertension, Dyslipidemia, Stage 3a chronic kidney disease (H), Chronic right-sided heart failure (H), Type 2 diabetes mellitus with diabetic polyneuropathy, with long-term current use of insulin (H), Severe obesity (H), Diabetic ulcer of left heel associated with type 2 diabetes mellitus, with necrosis of bone (H), Other chronic osteomyelitis of left foot (H), 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), Cellulitis of right lower extremity, Venous stasis, Lymphedema, Status post debridement, Opioid dependence, uncomplicated (H), Redness, Hx of BKA, left (H), Essential hypertension with goal blood pressure less than 140/90, Skin lesion, Acute blood loss anemia, PAD (peripheral artery disease) (H24), and Atrial fibrillation, unspecified type (H) were also pertinent to this visit.    Met with patient who was accompanied by daughter at bedside. She reports having a telephone visit with vascular who notified them of flap surgery is recommended and advised within the next few weeks if possible. No date confirmed quite yet noted per chart review. I did advise her when surgery is planned then this provider can complete pre op that is necessary for that surgery. They were told to anticipate a few night hospital admission post surgery and then will transition back to TCU/LTC for ongoing management needs. She denies any chest pain, palpitations, shortness of breath, RASHID, lightheadedness, dizziness, or cough. Denies any abdominal discomfort. Denies N&V.  "Denies B&B concerns. Denies dysuria or frequency. Denies loose or constipation. Appetite good. Sleeping well. New mild rash noted to bilateral hands--I suspect it is due to recent antibiotic course for cellulitis to RLE. She has not applied any hydrocortisone cream but only aquaphor for dryness to hands. I advised to start hydrocortisone for now and to monitor. No itch complaints reported. No pain complaints.     BP Readings from Last 3 Encounters:   12/11/23 (!) 144/64   12/07/23 139/87   12/04/23 (!) 143/80     Wt Readings from Last 5 Encounters:   12/11/23 107.2 kg (236 lb 6.4 oz)   12/07/23 104.3 kg (230 lb)   12/04/23 107.4 kg (236 lb 12.8 oz)   11/30/23 105.1 kg (231 lb 12.8 oz)   11/29/23 103.4 kg (227 lb 14.4 oz)     Allergies, and PMH/PSH reviewed in EPIC today.  REVIEW OF SYSTEMS:  4 point ROS including Respiratory, CV, GI and , other than that noted in the HPI,  is negative    Objective:   BP (!) 144/64   Pulse 70   Temp 97.7  F (36.5  C)   Resp 17   Ht 1.676 m (5' 6\")   Wt 107.2 kg (236 lb 6.4 oz)   SpO2 98%   BMI 38.16 kg/m    GENERAL APPEARANCE:  Alert, in no distress, oriented, morbidly obese, cooperative  ENT:  Mouth and posterior oropharynx normal, moist mucous membranes, normal hearing acuity  EYES:  EOM, conjunctivae, lids, pupils and irises normal  NECK:  No adenopathy,masses or thyromegaly  RESP:  respiratory effort and palpation of chest normal, lungs clear to auscultation , no respiratory distress  CV:  Palpation and auscultation of heart done , regular rate and rhythm, no murmur, rub, or gallop, peripheral edema 2-3+ in RLE  ABDOMEN:  normal bowel sounds, soft, nontender, no hepatosplenomegaly or other masses, no guarding or rebound  M/S:   Slide board transfers. Wheelchair bound. Staff to assist as directed  SKIN:  Inspection of skin and subcutaneous tissue baseline, wound healing well, no signs of infection see photos.   NEURO:   Cranial nerves 2-12 are normal tested and grossly at " patient's baseline, no purposeful movement in upper and lower extremities  PSYCH:  oriented X 3, normal insight, judgement and memory, affect and mood normal                      Most Recent 3 CBC's:  Recent Labs   Lab Test 12/08/23  0600 11/24/23  1012 11/17/23  1033   WBC 7.5 7.0 7.8   HGB 9.6* 9.9* 10.5*   MCV 91 94 95    228 218     Most Recent 3 BMP's:  Recent Labs   Lab Test 12/08/23  1226 11/24/23  1012 11/17/23  1033    140 141   POTASSIUM 4.6 4.5 4.3   CHLORIDE 98 101 101   CO2 30* 28 29   BUN 32.4* 35.2* 27.9*   CR 0.96* 0.98* 0.93   ANIONGAP 9 11 11   ZAKIA 10.1 10.4* 10.9*   * 228* 205*     Most Recent 2 LFT's:  Recent Labs   Lab Test 11/17/23  1033 11/01/23  0613   AST 20 19   ALT 11 12   ALKPHOS 90 97   BILITOTAL 0.5 0.2     Most Recent 3 INR's:  Recent Labs   Lab Test 12/08/23  1226 11/24/23  1012 11/17/23  1033   INR 2.73* 3.50* 1.69*     Most Recent Hemoglobin A1c:  Recent Labs   Lab Test 12/08/23  1226   A1C 7.6*     Most Recent Anemia Panel:  Recent Labs   Lab Test 12/08/23  0600   WBC 7.5   HGB 9.6*   HCT 31.1*   MCV 91          ASSESSMENT/PLAN:  (E11.621,  L97.509,  Z79.4) Type 2 diabetes mellitus with foot ulcer, with long-term current use of insulin (H)  (E66.01) Obesity, Class III, BMI 40-49.9 (morbid obesity) (H)  Comment: Chronic. Last A1c 7% in sept 2023. Results 12/8/23 were 7.6%. Goal <9%.   Plan:   -Monitor Blood Glucose QID as directed  -Continue sliding scale insulin as directed TID with meals and HS  -Continue tresiba 14 units at HS  -Trend labs periodically while on TCU     (I48.0) Paroxysmal atrial fibrillation (H)  (Z79.01) Long term (current) use of anticoagulants  (D68.32,  T45.515A) Warfarin-induced coagulopathy   (I10) Essential hypertension with goal blood pressure less than 140/90  (E78.2) Mixed hyperlipidemia  (N18.30) Stage 3 chronic kidney disease, unspecified whether stage 3a or 3b CKD (H)  (I50.812) Chronic right-sided heart failure  (H)  Comment: Chronic. Baseline Creatinine~ 1-1.2. Based on JNC-8 goals,  patients age of 78 year old, presence of diabetes or CKD, and goals of care goal BP is  <140/90 mm Hg. Patient is stable with current plan of care and routine assessment..Holding lisinopril per chart review per patient wishes. INR goal 2-3.INR 12/8/23-2.73  Plan:   -Monitor for worsening s/symptoms of concerns  -Monitor BP and HR as directed  -Continue asa 81mg daily  -Continue atorvastatin 40mg daily  -Continue coumadin 2mg daily  -Monitor weights as directed. Baseline weight around 222-224# on admission but now weight noted to be 236#.   -Continue torsemide 20mg daily. HOLD if SBP<100  -Trend labs periodically while on TCU  -INR due- 12/15/23     (R60.0) Edema of right extremity  (L53.9) Redness  (L03.115) Cellulitis of right lower extremity  Comment: Acute on chronic. Noted increased redness to RLE with warmth and pain soon after admission. Currently on coumadin. US negative for DVT risks. 4 areas areas are present to right leg. 3 anterior and 1 posterior (see photo above for reference). History of completing keflex with good improvement, but now redness persists to anterior portion of calf. Patient reports redness comes and goes daily. Redness was marked with black marker, Offered to start antibiotic course now, however patient wants to wait and monitor for now. I advised her that if redness persists or worsens, then to notify staff and we will start antibiotic treatment right away. In agreement to this plan below.  Unable to start prednisone given allergy risk. Suspect edema may also be contributing to wound concerns to RLE. SLOWLY IMPROVING  Plan:  -Lymphedema therapy to assess and assist with edema control---currently on hold for now per request  -Continue wound care to RLE for now:   *Cleanse with normal saline. Apply bactroban to open areas BID. Leave NANCY. Offered more intervention with dressings, however she declines at this time.    -Continue clindamycin x 10 day course until completion as directed  -Monitor for worsening s/symptoms of concerns  -Continue benadryl every 6 hours PRN  -Continue hydrocortisone cream to apply to back, arms and right leg redness BID and BID PRN  -Trend labs periodically while on TCU     (E11.621,  L97.425) Diabetic ulcer of left heel associated with type 2 diabetes mellitus, with muscle involvement without evidence of necrosis (H)  (Z89.512) 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)  (Z98.890) Status post debridement  (M86.9) Osteomyelitis of left foot, unspecified type (H)  (F11.20) Opioid dependence, uncomplicated (H)  (G89.4) Chronic pain syndrome  (I83.019,  I83.029,  L97.919,  L97.929) Venous stasis ulcers of both lower extremities (H)  (I89.0) Lymphedema  Comment: Chronic. S/p debridement 9/26/23. S/p Left guillotine BKA on 10/7/23. S/p tibial/fibular resection and debridement of stump 10/17/23. Followed by vascular. Completed course of meropenem/vancomycin 10/8 for osteomyelitis. Vascular surgery planning to close in the future, monthly clinic visits for wound checks with closure at unspecified time in the future  Plan:   -Monitor pain complaints  -In house wound provider to follow for ongoing needs   -Continue methadone 10mg daily at 1500 and 5mg TID scheduled.   -Tylenol PRN  -Continue triple lumen PICC to RUE as directed. Added daily flush and PRN along with dressing weekly changes.   -Trend labs periodically while on TCU  -Follow up with vascular as directed. Scheduled for 12/11/23 via telephone and 1/15/24 --pending surgery confirmation date at this time.   -Apply hemp lotion per her request to right heel dry skin BID--keep at bedside and staff to apply accordingly  -Continue current wound cares as directed;  *Wound Care - Left BKA : *Change daily* 1. Cleanse w/Vashe soaked gauze, including sreekanth wound skin, gentle pat dry. 2. Apply skin prep to sreekanth wound skin. 3. Soak Hydrofera  Blue with saline and place over wound bed, cover with abd, and wrap with kerlix to secure     Electronically Signed by:  Dr. Haylie Rowe DNP, APRN, FNP-C, WCS-C, EDS-C

## 2023-12-11 NOTE — TELEPHONE ENCOUNTER
Spoke with patient.  She would prefer to be scheduled for her surgery in December.  She would prefer a Friday, and if that is not available a Tuesday or Wednesday is fine.    Informed her we will work on getting her scheduled and will get back to her.

## 2023-12-11 NOTE — LETTER
12/11/2023        RE: Linda Loredo  [address]  [address]  [facility] [address]        M [facility] GERIATRICS    Chief Complaint   Patient presents with     RECHECK     HPI:  Linda Loredo is a 78 year old  (1945), who is being seen today for an episodic care visit at: [facility])([facility]) [[id number]]. Today's concern is: The primary encounter diagnosis was Paroxysmal atrial fibrillation (H). Diagnoses of Warfarin-induced coagulopathy , Long term (current) use of anticoagulants, Essential hypertension, Dyslipidemia, Stage 3a chronic kidney disease (H), Chronic right-sided heart failure (H), Type 2 diabetes mellitus with diabetic polyneuropathy, with long-term current use of insulin (H), Severe obesity (H), Diabetic ulcer of left heel associated with type 2 diabetes mellitus, with necrosis of bone (H), Other chronic osteomyelitis of left foot (H), 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), Cellulitis of right lower extremity, Venous stasis, Lymphedema, Status post debridement, Opioid dependence, uncomplicated (H), Redness, Hx of BKA, left (H), Essential hypertension with goal blood pressure less than 140/90, Skin lesion, Acute blood loss anemia, PAD (peripheral artery disease) (H24), and Atrial fibrillation, unspecified type (H) were also pertinent to this visit.    Met with patient who was accompanied by daughter at bedside. She reports having a telephone visit with vascular who notified them of flap surgery is recommended and advised within the next few weeks if possible. No date confirmed quite yet noted per chart review. I did advise her when surgery is planned then this provider can complete pre op that is necessary for that surgery. They were told to anticipate a few night hospital admission post surgery and then will transition back to TCU/LTC for ongoing management needs. She denies any chest pain, palpitations,  "shortness of breath, RASHID, lightheadedness, dizziness, or cough. Denies any abdominal discomfort. Denies N&V. Denies B&B concerns. Denies dysuria or frequency. Denies loose or constipation. Appetite good. Sleeping well. New mild rash noted to bilateral hands--I suspect it is due to recent antibiotic course for cellulitis to RLE. She has not applied any hydrocortisone cream but only aquaphor for dryness to hands. I advised to start hydrocortisone for now and to monitor. No itch complaints reported. No pain complaints.     BP Readings from Last 3 Encounters:   12/11/23 (!) 144/64   12/07/23 139/87   12/04/23 (!) 143/80     Wt Readings from Last 5 Encounters:   12/11/23 107.2 kg (236 lb 6.4 oz)   12/07/23 104.3 kg (230 lb)   12/04/23 107.4 kg (236 lb 12.8 oz)   11/30/23 105.1 kg (231 lb 12.8 oz)   11/29/23 103.4 kg (227 lb 14.4 oz)     Allergies, and PMH/PSH reviewed in EPIC today.  REVIEW OF SYSTEMS:  4 point ROS including Respiratory, CV, GI and , other than that noted in the HPI,  is negative    Objective:   BP (!) 144/64   Pulse 70   Temp 97.7  F (36.5  C)   Resp 17   Ht 1.676 m (5' 6\")   Wt 107.2 kg (236 lb 6.4 oz)   SpO2 98%   BMI 38.16 kg/m    GENERAL APPEARANCE:  Alert, in no distress, oriented, morbidly obese, cooperative  ENT:  Mouth and posterior oropharynx normal, moist mucous membranes, normal hearing acuity  EYES:  EOM, conjunctivae, lids, pupils and irises normal  NECK:  No adenopathy,masses or thyromegaly  RESP:  respiratory effort and palpation of chest normal, lungs clear to auscultation , no respiratory distress  CV:  Palpation and auscultation of heart done , regular rate and rhythm, no murmur, rub, or gallop, peripheral edema 2-3+ in RLE  ABDOMEN:  normal bowel sounds, soft, nontender, no hepatosplenomegaly or other masses, no guarding or rebound  M/S:   Slide board transfers. Wheelchair bound. Staff to assist as directed  SKIN:  Inspection of skin and subcutaneous tissue baseline, wound " healing well, no signs of infection see photos.   NEURO:   Cranial nerves 2-12 are normal tested and grossly at patient's baseline, no purposeful movement in upper and lower extremities  PSYCH:  oriented X 3, normal insight, judgement and memory, affect and mood normal                      Most Recent 3 CBC's:  Recent Labs   Lab Test 12/08/23  0600 11/24/23  1012 11/17/23  1033   WBC 7.5 7.0 7.8   HGB 9.6* 9.9* 10.5*   MCV 91 94 95    228 218     Most Recent 3 BMP's:  Recent Labs   Lab Test 12/08/23  1226 11/24/23  1012 11/17/23  1033    140 141   POTASSIUM 4.6 4.5 4.3   CHLORIDE 98 101 101   CO2 30* 28 29   BUN 32.4* 35.2* 27.9*   CR 0.96* 0.98* 0.93   ANIONGAP 9 11 11   ZAKIA 10.1 10.4* 10.9*   * 228* 205*     Most Recent 2 LFT's:  Recent Labs   Lab Test 11/17/23  1033 11/01/23  0613   AST 20 19   ALT 11 12   ALKPHOS 90 97   BILITOTAL 0.5 0.2     Most Recent 3 INR's:  Recent Labs   Lab Test 12/08/23  1226 11/24/23  1012 11/17/23  1033   INR 2.73* 3.50* 1.69*     Most Recent Hemoglobin A1c:  Recent Labs   Lab Test 12/08/23  1226   A1C 7.6*     Most Recent Anemia Panel:  Recent Labs   Lab Test 12/08/23  0600   WBC 7.5   HGB 9.6*   HCT 31.1*   MCV 91          ASSESSMENT/PLAN:  (E11.621,  L97.509,  Z79.4) Type 2 diabetes mellitus with foot ulcer, with long-term current use of insulin (H)  (E66.01) Obesity, Class III, BMI 40-49.9 (morbid obesity) (H)  Comment: Chronic. Last A1c 7% in sept 2023. Results 12/8/23 were 7.6%. Goal <9%.   Plan:   -Monitor Blood Glucose QID as directed  -Continue sliding scale insulin as directed TID with meals and HS  -Continue tresiba 14 units at HS  -Trend labs periodically while on TCU     (I48.0) Paroxysmal atrial fibrillation (H)  (Z79.01) Long term (current) use of anticoagulants  (D68.32,  T45.515A) Warfarin-induced coagulopathy   (I10) Essential hypertension with goal blood pressure less than 140/90  (E78.2) Mixed hyperlipidemia  (N18.30) Stage 3 chronic  kidney disease, unspecified whether stage 3a or 3b CKD (H)  (I50.812) Chronic right-sided heart failure (H)  Comment: Chronic. Baseline Creatinine~ 1-1.2. Based on JNC-8 goals,  patients age of 78 year old, presence of diabetes or CKD, and goals of care goal BP is  <140/90 mm Hg. Patient is stable with current plan of care and routine assessment..Holding lisinopril per chart review per patient wishes. INR goal 2-3.INR 12/8/23-2.73  Plan:   -Monitor for worsening s/symptoms of concerns  -Monitor BP and HR as directed  -Continue asa 81mg daily  -Continue atorvastatin 40mg daily  -Continue coumadin 2mg daily  -Monitor weights as directed. Baseline weight around 222-224# on admission but now weight noted to be 236#.   -Continue torsemide 20mg daily. HOLD if SBP<100  -Trend labs periodically while on TCU  -INR due- 12/15/23     (R60.0) Edema of right extremity  (L53.9) Redness  (L03.115) Cellulitis of right lower extremity  Comment: Acute on chronic. Noted increased redness to RLE with warmth and pain soon after admission. Currently on coumadin. US negative for DVT risks. 4 areas areas are present to right leg. 3 anterior and 1 posterior (see photo above for reference). History of completing keflex with good improvement, but now redness persists to anterior portion of calf. Patient reports redness comes and goes daily. Redness was marked with black marker, Offered to start antibiotic course now, however patient wants to wait and monitor for now. I advised her that if redness persists or worsens, then to notify staff and we will start antibiotic treatment right away. In agreement to this plan below.  Unable to start prednisone given allergy risk. Suspect edema may also be contributing to wound concerns to RLE. SLOWLY IMPROVING  Plan:  -Lymphedema therapy to assess and assist with edema control---currently on hold for now per request  -Continue wound care to RLE for now:   *Cleanse with normal saline. Apply bactroban to  open areas BID. Leave NANCY. Offered more intervention with dressings, however she declines at this time.   -Continue clindamycin x 10 day course until completion as directed  -Monitor for worsening s/symptoms of concerns  -Continue benadryl every 6 hours PRN  -Continue hydrocortisone cream to apply to back, arms and right leg redness BID and BID PRN  -Trend labs periodically while on TCU     (E11.621,  L97.425) Diabetic ulcer of left heel associated with type 2 diabetes mellitus, with muscle involvement without evidence of necrosis (H)  (Z89.512) 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)  (Z98.890) Status post debridement  (M86.9) Osteomyelitis of left foot, unspecified type (H)  (F11.20) Opioid dependence, uncomplicated (H)  (G89.4) Chronic pain syndrome  (I83.019,  I83.029,  L97.919,  L97.929) Venous stasis ulcers of both lower extremities (H)  (I89.0) Lymphedema  Comment: Chronic. S/p debridement 9/26/23. S/p Left guillotine BKA on 10/7/23. S/p tibial/fibular resection and debridement of stump 10/17/23. Followed by vascular. Completed course of meropenem/vancomycin 10/8 for osteomyelitis. Vascular surgery planning to close in the future, monthly clinic visits for wound checks with closure at unspecified time in the future  Plan:   -Monitor pain complaints  -In house wound provider to follow for ongoing needs   -Continue methadone 10mg daily at 1500 and 5mg TID scheduled.   -Tylenol PRN  -Continue triple lumen PICC to RUE as directed. Added daily flush and PRN along with dressing weekly changes.   -Trend labs periodically while on TCU  -Follow up with vascular as directed. Scheduled for 12/11/23 via telephone and 1/15/24 --pending surgery confirmation date at this time.   -Apply hemp lotion per her request to right heel dry skin BID--keep at bedside and staff to apply accordingly  -Continue current wound cares as directed;  *Wound Care - Left BKA : *Change daily* 1. Cleanse w/Vashe soaked gauze,  including sreekanth wound skin, gentle pat dry. 2. Apply skin prep to sreekanth wound skin. 3. Soak Hydrofera Blue with saline and place over wound bed, cover with abd, and wrap with kerlix to secure     Electronically Signed by:  Dr. Haylie Rowe DNP, APRN, FNP-C, WCS-C, EDS-C            Sincerely,        Haylie Rowe, MOE CNP

## 2023-12-11 NOTE — TELEPHONE ENCOUNTER
CASE REQUEST RECEIVED ON 12/11/23 FOR: APPLICATION OF SPLIT-THICKNESS SKIN GRAFT TO LEFT BELOW KNEE STUMP, GRAFT FROM LEFT THIGH AND APPLICATION OF WOUND VAC    CASE ID: [id number]

## 2023-12-12 DIAGNOSIS — T14.8XXA OPEN WOUND: ICD-10-CM

## 2023-12-12 DIAGNOSIS — Z89.512 STATUS POST BELOW-KNEE AMPUTATION OF LEFT LOWER EXTREMITY (H): Primary | ICD-10-CM

## 2023-12-12 NOTE — TELEPHONE ENCOUNTER
Patient left a message on the Surgery Scheduling line that she can be scheduled on any Tuesday, Wednesday or Friday.  There are no dates to avoid.      Spoke with patient and acknowledged her request.  Informed her we will work on getting her scheduled and will get back to her.

## 2023-12-13 NOTE — TELEPHONE ENCOUNTER
Spoke with patient and reviewed dates/times for surgery.      I will call patient back about the post-op appointment.

## 2023-12-14 ENCOUNTER — LAB REQUISITION (OUTPATIENT)
Dept: LAB | Facility: CLINIC | Age: 78
End: 2023-12-14
Payer: COMMERCIAL

## 2023-12-14 DIAGNOSIS — I48.0 PAROXYSMAL ATRIAL FIBRILLATION (H): ICD-10-CM

## 2023-12-14 NOTE — PROGRESS NOTES
"[facility] GERIATRICS  [place] Medical Record Number:  [id number]  Place of Service where encounter took place: [facility] (TCU)([facility]) [[id number]]    HPI:    Linda Loredo is a 78 year old  (1945), who is being seen today for an episodic care visit at the above location. Today's concern is INR/Coumadin management for ELIE. Sariah    ROS/Subjective:  Bleeding Signs/Symptoms:  None  Thromboembolic Signs/Symptoms:  None  Medication Changes:  No  Dietary Changes:  No  Activity Changes: No  Bacterial/Viral Infection:  Yes: Currently on clindamycin for suspected cellulitis to RLE  Missed Coumadin Doses:  None  On ASA: Yes - 81 mg po q day  Other Concerns:  No    OBJECTIVE:  BP (!) 149/79   Pulse 86   Temp 97.9  F (36.6  C)   Resp 18   Ht 1.676 m (5' 6\")   Wt 106.5 kg (234 lb 12.8 oz)   SpO2 100%   BMI 37.90 kg/m    Last INR: 2.73 on 12/8/23  INR Today:  3.10  Current Dose:  Coumadin 2mg daily    ASSESSMENT:     Paroxysmal atrial fibrillation (H)  Warfarin-induced coagulopathy   Long term (current) use of anticoagulants  Encounter for therapeutic drug monitoring  Supratherapeutic INR for goal of 2-3    PLAN/ORDERS:   New Dose: Give 1.5mg on mon/thurs and 2mg AOD    Next INR: 1 week 12/22/23    Electronically signed by:  Dr. Haylie Rowe DNP, APRN, FNP-C, WCS-C, EDS-C    "

## 2023-12-15 ENCOUNTER — TRANSITIONAL CARE UNIT VISIT (OUTPATIENT)
Dept: GERIATRICS | Facility: CLINIC | Age: 78
End: 2023-12-15
Payer: COMMERCIAL

## 2023-12-15 VITALS
BODY MASS INDEX: 37.73 KG/M2 | SYSTOLIC BLOOD PRESSURE: 149 MMHG | RESPIRATION RATE: 18 BRPM | WEIGHT: 234.8 LBS | TEMPERATURE: 97.9 F | OXYGEN SATURATION: 100 % | HEART RATE: 86 BPM | DIASTOLIC BLOOD PRESSURE: 79 MMHG | HEIGHT: 66 IN

## 2023-12-15 DIAGNOSIS — I48.0 PAROXYSMAL ATRIAL FIBRILLATION (H): Primary | ICD-10-CM

## 2023-12-15 DIAGNOSIS — Z51.81 ENCOUNTER FOR THERAPEUTIC DRUG MONITORING: ICD-10-CM

## 2023-12-15 DIAGNOSIS — Z79.01 LONG TERM (CURRENT) USE OF ANTICOAGULANTS: ICD-10-CM

## 2023-12-15 DIAGNOSIS — T45.515A WARFARIN-INDUCED COAGULOPATHY (H): ICD-10-CM

## 2023-12-15 DIAGNOSIS — D68.32 WARFARIN-INDUCED COAGULOPATHY (H): ICD-10-CM

## 2023-12-15 PROCEDURE — 99308 SBSQ NF CARE LOW MDM 20: CPT | Performed by: NURSE PRACTITIONER

## 2023-12-15 NOTE — LETTER
"    12/15/2023        RE: Linda Loredo  [address]  [address]  [facility] [address]        [facility]  [place] Medical Record Number:  [id number]  Place of Service where encounter took place: [facility] (TCU)([facility]) [[id number]]    HPI:    Linda Loredo is a 78 year old  (1945), who is being seen today for an episodic care visit at the above location. Today's concern is INR/Coumadin management for ELIE. Fib    ROS/Subjective:  Bleeding Signs/Symptoms:  None  Thromboembolic Signs/Symptoms:  None  Medication Changes:  No  Dietary Changes:  No  Activity Changes: No  Bacterial/Viral Infection:  Yes: Currently on clindamycin for suspected cellulitis to RLE  Missed Coumadin Doses:  None  On ASA: Yes - 81 mg po q day  Other Concerns:  No    OBJECTIVE:  BP (!) 149/79   Pulse 86   Temp 97.9  F (36.6  C)   Resp 18   Ht 1.676 m (5' 6\")   Wt 106.5 kg (234 lb 12.8 oz)   SpO2 100%   BMI 37.90 kg/m    Last INR: 2.73 on 12/8/23  INR Today:  3.10  Current Dose:  Coumadin 2mg daily    ASSESSMENT:     Paroxysmal atrial fibrillation (H)  Warfarin-induced coagulopathy   Long term (current) use of anticoagulants  Encounter for therapeutic drug monitoring  Supratherapeutic INR for goal of 2-3    PLAN/ORDERS:   New Dose: Give 1.5mg on mon/thurs and 2mg AOD    Next INR: 1 week 12/22/23    Electronically signed by:  Dr. Haylie Rowe DNP, APRN, FNP-C, WCS-C, EDS-C        Sincerely,        Haylie Rowe, MOE CNP      "

## 2023-12-17 NOTE — PROGRESS NOTES
[facility] GERIATRICS  [address] [place] [address]  Phone: [phone]  Fax: [phone]  Primary Provider: Ish Brown  Pre-op Performing Provider: ANNIKA ALMAZAN    PREOPERATIVE EVALUATION:  Today's date: 12/18/2023    Margaret is a 78 year old, presenting for the following:  Pre-Op Exam      Surgical Information:  Surgery/Procedure: Application of left split thickness skin graft to left below knee stump; graft from left thigh; wound vac application to left below knee stump  Surgery Location: [facility]  Surgeon: Dr Leta Otto  Surgery Date: 1/2/24  Time of Surgery: 0930a  Where patient plans to recover: At a TCU (Transitional Care Unit)  Fax number for surgical facility: Note does not need to be faxed, will be available electronically in Epic.    Patient has a Health Care Directive or Living Will:  YES see file      Assessment & Plan     The proposed surgical procedure is considered INTERMEDIATE risk.    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      Risks and Recommendations:  The patient has the following additional risks and recommendations for perioperative complications:   - Consult Hospitalist / IM to assist with post-op medical management   - Morbid obesity (BMI >40)    Diabetes:  - Patient is on insulin therapy; diabetic NPO guidelines provided and discussed.  Pulmonary:    - Incentive spirometry post-op  Obstructive Sleep Apnea:   Remains noncompliant with CPAP  Anemia/Bleeding/Clotting:    - Anemia and does not require treatment prior to surgery. Monitor hemoglobin postoperatively  Infection:    - Patient has a history of  "MRSA    Antiplatelet or Anticoagulation Medication Instructions:   - aspirin: Bleeding risk is low for this procedure and daily aspirin may be continued without modification.    - warfarin: Thromboembolic risk is low (<4%/year). HOLD warfarin for 5 days without bridging before procedure.     Additional Medication Instructions:  May continue all scheduled medications EXCEPT:   - Diuretics: May continue due to heart failure.   - Statins: Continue taking on the day of surgery.    - Long acting insulin (e.g. glargine, detemir): Take 80% of the usual evening or morning dose before surgery of tresiba. Give 10units subcutaneous day of surgery x 1   - short acting insulin (e.g. regular, lispro, aspart): HOLD on the morning of surgery.    - fiber, laxatives: HOLD day of surgery and  - loperamide, diphenoxylate/atropine: HOLD day of surgery.     - Herbal medications and vitamins: HOLD 14 days prior to surgery. (HOLD MVI AND VITAMIN C)- HOLD starting 12/19/23 until further notice    -Topicals: HOLD morning of surgery    Start NPO status on 1/3/24 at 1100PM, may have clear fluids until 0500.   Shower with hibiclens night prior to surgery and morning of surgery as directed    RECOMMENDATION:  APPROVAL GIVEN to proceed with proposed procedure, without further diagnostic evaluation.     Subjective       HPI related to upcoming procedure: S/p debridement 9/26/23. S/p Left guillotine BKA on 10/7/23. S/p tibial/fibular resection and debridement of stump 10/17/23. Followed by vascular. Completed course of meropenem/vancomycin 10/8 for osteomyelitis. Planning for graft flap closure with wound vac placement needs on 1/2/24.     Health Care Directive:  Patient has a Health Care Directive on file    {PREOP QUESTIONNAIRE OPTIONS(by MA):176211::    1.NO- BKA to left leg hx - Do you have a history of heart attack, stroke, stent, bypass or surgery on an artery in the head, neck, heart or legs?\"  2. NO Do you ever have any pain or discomfort " "in your chest?\",\"  3. YES CHF present with torsemide treatment. Do you have a history of  Heart Failure?\",  4. NOAre you troubled by shortness of breath when: walking on the level, up a slight hill or at night?\",  5. NO Do you currently have a cold, bronchitis or other respiratory infection?\",  6. NODo you have a cough, shortness of breath or wheezing?\",  7. NODo you sometimes get pains in the calves of your legs when you walk?\",  8. NODo you or anyone in your family have previous history of blood clots?\",  9. NODo you or does anyone in your family have a serious bleeding problem such as prolonged bleeding following surgeries or cuts?\",  10.NOHave you ever had problems with anemia or been told to take iron pills?\",  11. NO Have you had any abnormal blood loss such as black, tarry or bloody stools, or abnormal vaginal bleeding?\",  12. YES- No acute concerns. Have you ever had a blood transfusion?\",  13. NOHave you or any of your relatives ever had problems with anesthesia?\",  14. YES BERLIN present, but noncompliant with CPAP use. Do you have sleep apnea, excessive snoring or daytime drowsiness?\",  15. NODo you have any prosthetic heart valves?\",  16. NO- Do you have prosthetic joints?    Health Care Directive:  Patient has a Health Care Directive on file      Preoperative Review of :   reviewed - controlled substances reflected in medication list.    Status of Chronic Conditions:  See problem list for active medical problems.  Problems all longstanding and stable, except as noted/documented.  See ROS for pertinent symptoms related to these conditions.    A-FIB - Patient has a longstanding history of chronic A-fib currently on rate control. Current treatment regimen includes Warfarin and Aspirin for stroke prevention and denies significant symptoms of lightheadedness, palpitations or dyspnea.     CHF - Patient has a longstanding history of moderate-severe CHF. Exacerbating conditions include hypertension, COPD, " and atrial fibrilation. Currently the patient's condition is improving. Current treatment regimen includes ASA and diuretic. The patient denies chest pain, edema, orthopnea, SOB or recent weight gain. Last Echocardiogram 5/2022 (see results below) , EKG on 12/16/23: see results below            COPD - Patient has a longstanding history of moderate-severe COPD . Patient has been doing well overall noting NO SYMPTOMS with no treatment course except for diuretic daily use without adverse reactions or side effects.    DIABETES - Patient has a longstanding history of DiabetesType Type II . Patient is being treated with diet, exercise, insulin injections, and ASA and denies significant side effects. Control has been good. Complicating factors include but are not limited to: hypertension, hyperlipidemia, foot ulcer, chronic kidney disease, and morbid obesity .     HYPERLIPIDEMIA - Patient has a long history of significant Hyperlipidemia requiring medication for treatment with recent good control. Patient reports no problems or side effects with the medication.     HYPERTENSION - Patient has longstanding history of HTN , currently denies any symptoms referable to elevated blood pressure. Specifically denies chest pain, palpitations, dyspnea, orthopnea, PND or peripheral edema. Blood pressure readings have been in normal range. Current medication regimen is as listed below. Patient denies any side effects of medication.     RENAL INSUFFICIENCY - Patient has a longstanding history of moderate-severe chronic renal insufficiency. Last Cr 0.96 on 12/8/23.     Review of Systems  CONSTITUTIONAL: NEGATIVE for fever, chills, change in weight  INTEGUMENTARY/SKIN: Present for lesions to RLE along with stump wound   EYES: NEGATIVE for vision changes or irritation  ENT/MOUTH: NEGATIVE for ear, mouth and throat problems  RESP: NEGATIVE for significant cough or SOB  CV: NEGATIVE for chest pain, palpitations or peripheral edema  GI:  NEGATIVE for nausea, abdominal pain, heartburn, or change in bowel habits  : NEGATIVE for frequency, dysuria, or hematuria  MUSCULOSKELETAL: NEGATIVE for significant arthralgias or myalgia  NEURO: NEGATIVE for weakness, dizziness or paresthesias  ENDOCRINE: NEGATIVE for temperature intolerance, skin/hair changes  HEME: NEGATIVE for bleeding problems  PSYCHIATRIC: NEGATIVE for changes in mood or affect    Patient Active Problem List    Diagnosis Date Noted    Chronic right-sided heart failure (H) 11/16/2023     Priority: Medium    Osteomyelitis (H) 10/24/2023     Priority: Medium    Diabetic foot ulcer (H) 09/29/2023     Priority: Medium    History of atrial fibrillation 09/22/2023     Priority: Medium    Non-healing wound of left heel 09/22/2023     Priority: Medium    Unable to care for self 09/22/2023     Priority: Medium    Cellulitis - bilateral lower extremities 05/27/2023     Priority: Medium    Decubitus ulcers - 5 present on buttocks/perineal region, numerous scabbed over and unstagable on shin and bilateral feet with some bloody blisters noted on feet 05/27/2023     Priority: Medium    Warfarin-induced coagulopathy  05/27/2023     Priority: Medium    Hypoglycemia 05/27/2023     Priority: Medium    Supratherapeutic INR 05/27/2023     Priority: Medium    Cellulitis of buttock 05/26/2023     Priority: Medium    Sepsis without acute organ dysfunction, due to unspecified organism (H) 05/26/2023     Priority: Medium    UTI (urinary tract infection) - possible 05/26/2023     Priority: Medium    GUSTAVO (acute kidney injury) (H24) 05/26/2023     Priority: Medium    Dehydration 05/26/2023     Priority: Medium    Weakness 05/02/2023     Priority: Medium    Opioid dependence, uncomplicated (H) 04/26/2023     Priority: Medium    Abscess of left foot 10/31/2022     Priority: Medium    Vitamin D deficiency 09/08/2022     Priority: Medium    Anticoagulation monitoring, INR range 2-3 07/06/2022     Priority: Medium     Paroxysmal atrial fibrillation (H) 05/30/2022     Priority: Medium     Formatting of this note might be different from the original.  New onset during 5/2022 admission      Diabetic ulcer of left heel associated with type 2 diabetes mellitus, with muscle involvement without evidence of necrosis (H) 05/17/2022     Priority: Medium    Obesity, Class III, BMI 40-49.9 (morbid obesity) (H) 05/05/2022     Priority: Medium    Venous stasis ulcers of both lower extremities (H) 01/13/2022     Priority: Medium    Bilateral lower extremity edema 05/29/2019     Priority: Medium    Systolic murmur 05/29/2019     Priority: Medium    Umbilical hernia without obstruction and without gangrene 12/13/2018     Priority: Medium    Microalbuminuria due to type 2 diabetes mellitus (H) 10/25/2016     Priority: Medium    Osteopenia 05/20/2015     Priority: Medium    Primary hyperparathyroidism (H24) 01/23/2013     Priority: Medium     Formatting of this note might be different from the original.  work up January 2013 Dr. Villareal      Hypotension 10/27/2011     Priority: Medium    Fibromyalgia 10/20/2011     Priority: Medium    Anxiety 10/20/2011     Priority: Medium    Chronic kidney disease 10/20/2011     Priority: Medium     Problem list name updated by automated process. Provider to review      Elevated liver enzymes 10/20/2011     Priority: Medium    Fracture of fibula, distal, left, closed 10/20/2011     Priority: Medium     ORIF 10/13/11      COPD (chronic obstructive pulmonary disease) (H) 04/26/2010     Priority: Medium    Chronic pain 04/26/2010     Priority: Medium    Allergic rhinitis 04/08/2008     Priority: Medium    Major depressive disorder, recurrent episode, moderate (H) 04/08/2008     Priority: Medium    Mixed hyperlipidemia due to type 2 diabetes mellitus (H) 04/08/2008     Priority: Medium     Formatting of this note is different from the original.    07/20/22 ASCVD 10 year risk: 37.9%     Last Lipids:  Last  Lipids:  Chol: 2021 130   63  HDL: 2021 46  Non-HDL: 2021 84  Chol/HDL Ratio: 2021 2.83  LDL DIRECT: . No results found in past 5 years   LDL CHOLESTEROL (mg/dL)   Date Value   2021 71     22   The 10-year ASCVD risk score (Teddy SMITH Jr., et al., 2013) is: 37.9%    Values used to calculate the score:      Age: 77 years      Sex: Female      Is Non- : No      Diabetic: Yes      Tobacco smoker: No      Systolic Blood Pressure: 118 mmHg      Is BP treated: Yes      HDL Cholesterol: 46 mg/dL      Total Cholesterol: 130 mg/dL      Hypersomnia with sleep apnea 10/23/2007     Priority: Medium     Sleep study 2004 showing severe OBSTRUCTIVE SLEEP APNEA and recommend CPAP  Problem list name updated by automated process. Provider to review      Generalized osteoarthrosis, unspecified site 2006     Priority: Medium     2007: Linda felt that Naprosyn was not helpful.      Routine general medical examination at a [facility] care facility 2006     Priority: Medium     Mammogram:  Mammogram due 07.  Pap: 2007 done.  Colonoscopy: scheduled .  Lipids: 2006: CHOL 132, HDL  40,LDL 77,TRIG 78,  Influenza vaccine: 10/06  Pneuovax  Vaccine:   Adacel  Vaccine:       Diabetic polyneuropathy associated with type 2 diabetes mellitus (H) 2006     Priority: Medium     2007: on gabapentin.  She has been switched from gabapentin to lyrica.  Problem list name updated by automated process. Provider to review       OBESITY 2006     Priority: Medium     2007: Body mass index is 48.07 kg/(m^2).       Mixed hyperlipidemia 2005     Priority: Medium     Lipids: 2006: CHOL 132, HDL  40,LDL 77,TRIG 78,  2007: taking: Zetia and atorvastatin.      Insomnia 07/15/2005     Priority: Medium     2007: see by Dr. Car. On ambien and trazadone.  Problem list  name updated by automated process. Provider to review      Herpes zoster 06/20/2005     Priority: Medium     February 26, 2007: On gabapentin and lidoderm.   She has been switched from gabapentin to lyrica.  Problem list name updated by automated process. Provider to review      DEPRESSION 05/09/2005     Priority: Medium     February 26, 2007: Followed by Dr. Car. currently on: sertraline, bupropion, trazadone and ambilify.  2/28/08: Margaret was asked to speak with her psychiatrist about her psychiatric medications.      Essential hypertension with goal blood pressure less than 140/90 05/09/2005     Priority: Medium     Cardiology referral:   February 26, 2007: taking: losartan, lisinopril, labetalol, aspirin, and furosemide.  Problem list name updated by automated process. Provider to review    Formatting of this note is different from the original.    BP Readings from Last 1 Encounters:   07/11/22 118/66     CREATININE (mg/dL)   Date Value   06/04/2022 0.89     ALBUMIN TO CREATININE RATIO,RAND UR      (mg/g creat)   Date Value   11/12/2009 25.7      combination of stress and urge URINARY INCONTINENCE 05/09/2005     Priority: Medium     February 26, 2007: On Detrol LA.      Type 2 diabetes mellitus with complication, with long-term current use of insulin (H) 04/18/2005     Priority: Medium     11/05: ophthalmology=Dr. German, exam 11/05 no evidence of diabetic retinopathy.  February 26, 2007: Poor control A1C      8.6   12/22/2006.   Medications: insulin pump: Humulog (Basal rate is 1.8 units and 1/2 usual meal bolus of insulin), Byetta, metformin. A CGMS (of insulin pump) is currently being done with diabetic educator Rosemary Pendleton.    Endocrine Consult at [facility]: see scanned notes for details. Linda has not followed up with them as of yet.  Problem list name updated by automated process. Provider to review    Formatting of this note is different from the original.  diagnosis 2001    HEMOGLOBIN A1C MONITORING  (POCT) (%)   Date Value   05/28/2022 7.4 (H)   06/17/2021 7.1 (H)        Past Medical History:   Diagnosis Date    Depressive disorder, not elsewhere classified     Herpes zoster without mention of complication     Hypercalcemia 2/24/2007    Mild intermittent asthma     Other urinary incontinence     Type II or unspecified type diabetes mellitus with renal manifestations, uncontrolled(250.42) (H)     Type II or unspecified type diabetes mellitus without mention of complication, not stated as uncontrolled     Unspecified essential hypertension      Past Surgical History:   Procedure Laterality Date    AMPUTATE LEG BELOW KNEE Left 10/7/2023    Procedure: LEFT GUILLOTINE BELOW KNEE AMPUTATION.;  Surgeon: Lan Otto MD;  Location: [facility] OR    AMPUTATE LEG BELOW KNEE Left 10/14/2023    Procedure: debridement of left below the knee amputation;  Surgeon: Lan Otto MD;  Location: [facility] OR    CHOLECYSTECTOMY      INCISION AND DRAINAGE FOOT, COMBINED Left 9/26/2023    Procedure: Surgical debridement of all nonviable skin and soft tissue and bone left foot;  Surgeon: Nolberto Thorne DPM;  Location: [facility] OR    IR LOWER EXTREMITY ANGIOGRAM LEFT  10/3/2023    ligation of fallopian tube      OPEN REDUCTION INTERNAL FIXATION ANKLE  10/13/11    left    pinning of left 2nd toe       Current Outpatient Medications   Medication Sig Dispense Refill    acetaminophen (TYLENOL) 325 MG tablet Take 2 tablets (650 mg) by mouth every 6 hours as needed for mild pain or other (and adjunct with moderate or severe pain or per patient request)      aspirin 81 MG EC tablet Take 1 tablet (81 mg) by mouth daily      atorvastatin (LIPITOR) 40 MG tablet Take 1 tablet (40 mg) by mouth every evening      bisacodyl (DULCOLAX) 10 MG suppository Place 1 suppository (10 mg) rectally daily as needed for constipation 30 suppository 0    chlorhexidine (HIBICLENS) 4 % liquid Hibiclens shower night prior to surgery on 1/1/24 AND morning of  surgery on 1/2/24 473 mL 0    clindamycin (CLEOCIN) 300 MG capsule Take 1 capsule (300 mg) by mouth 3 times daily for 10 days 30 capsule 0    diphenhydrAMINE (BENADRYL) 25 MG tablet Take 1 tablet (25 mg) by mouth every 6 hours as needed for itching or allergies 60 tablet 3    hydrocortisone 2.5 % cream Apply topically 2 times daily Apply to back, arms and to redness to RLE BID and BID PRN 30 g 11    insulin aspart (NOVOLOG PEN) 100 UNIT/ML pen Inject 1-7 Units Subcutaneous 3 times daily (before meals) 15 mL 0    insulin aspart (NOVOLOG PEN) 100 UNIT/ML pen Inject 1-5 Units Subcutaneous at bedtime 15 mL 0    insulin degludec (TRESIBA) 200 UNIT/ML pen Inject 14 Units Subcutaneous at bedtime Hold for blood glucose less than 100      loperamide (IMODIUM A-D) 2 MG tablet Take 1 tablet (2 mg) by mouth 4 times daily as needed for diarrhea 60 tablet 1    methadone (DOLOPHINE) 10 MG tablet Take 1 tablet (10 mg) by mouth daily Daily at 1500 30 tablet 0    methadone (DOLOPHINE) 5 MG tablet Take 1 tablet (5 mg) by mouth 3 times daily 90 tablet 0    miconazole (MICATIN) 2 % external powder Apply topically 2 times daily Apply to buttock and fungal areas BID and BID PRN 85 g 11    multivitamin w/minerals (THERA-VIT-M) tablet Take 1 tablet by mouth daily      mupirocin (BACTROBAN) 2 % external ointment Apply topically 2 times daily Apply to RLE lesion 3x weekly with lymphedema changes      ondansetron (ZOFRAN ODT) 4 MG ODT tab Take 1 tablet (4 mg) by mouth every 6 hours as needed for nausea or vomiting 21 tablet 0    polyethylene glycol (MIRALAX) 17 GM/Dose powder Take 17 g by mouth 2 times daily as needed for constipation      senna-docusate (SENOKOT-S/PERICOLACE) 8.6-50 MG tablet Take 2 tablets by mouth 2 times daily as needed for constipation      torsemide (DEMADEX) 10 MG tablet Take 2 tablets (20 mg) by mouth daily HOLD if SBP<100 60 tablet 11    Warfarin Therapy Reminder Per INR      alteplase (CATHFLO ACTIVASE) 2 MG  "injection Triple Lumen PICC to RUE. May flush each lumen of 2mg each for blood return needs 3 mL 1    sodium chloride, PF, (SODIUM CHLORIDE FLUSH) 0.9% PF flush Flush each PICC lumen (x3) with 10mL 0.9% sodium chloride daily and PRN before and after use. (Patient not taking: Reported on 12/18/2023) 1000 mL 11    vitamin C (ASCORBIC ACID) 500 MG tablet Take 500 mg by mouth daily (Patient not taking: Reported on 12/18/2023)         Allergies   Allergen Reactions    Prednisone Nausea and Vomiting    Morphine Nausea and Vomiting    Morphine [Fumaric Acid] Nausea and Vomiting        Social History     Tobacco Use    Smoking status: Never    Smokeless tobacco: Never   Substance Use Topics    Alcohol use: No     Family History   Problem Relation Age of Onset    Hypertension Mother     Heart Disease Father         heart attack and cardiomegaly    Diabetes Sister         type 2    Hypertension Sister     Respiratory Sister     Psychotic Disorder Sister         bipolar    Cancer Maternal Grandmother         throat    Cancer Paternal Grandmother         gastric     History   Drug Use No         Objective     /69   Pulse 90   Temp 97.9  F (36.6  C)   Resp 18   Ht 1.676 m (5' 6\")   Wt 106.8 kg (235 lb 6.4 oz)   SpO2 97%   BMI 37.99 kg/m      Physical Exam    GENERAL APPEARANCE: healthy, alert and no distress     EYES: EOMI, PERRL     HENT: ear canals and TM's normal and nose and mouth without ulcers or lesions     NECK: no adenopathy, no asymmetry, masses, or scars and thyroid normal to palpation     RESP: lungs clear to auscultation - no rales, rhonchi or wheezes     CV: regular rates and rhythm, normal S1 S2, no S3 or S4 and no murmur, click or rub     ABDOMEN:  soft, nontender, no HSM or masses and bowel sounds normal     MS: extremities normal- no gross deformities noted, no evidence of inflammation in joints, FROM in all extremities.     SKIN: see photo below. Stump wound present to Left stump. Lesions noted to " RLE which are acute on chronic-slowly improving     NEURO: Normal strength and tone, sensory exam grossly normal, mentation intact and speech normal     PSYCH: mentation appears normal. and affect normal/bright     LYMPHATICS: No cervical adenopathy                      Recent Labs   Lab Test 12/08/23  1226 12/08/23  0600 11/24/23  1012 09/24/23  0456 09/23/23  0533   HGB  --  9.6* 9.9*   < > 10.7*   PLT  --  198 228   < > 196   INR 2.73*  --  3.50*   < > 4.97*     --  140   < > 140   POTASSIUM 4.6  --  4.5   < > 3.5   CR 0.96*  --  0.98*   < > 1.14*   A1C 7.6*  --   --   --  7.0*    < > = values in this interval not displayed.        Diagnostics:  Recent Results (from the past 240 hour(s))   CBC with platelets and differential    Collection Time: 12/08/23  6:00 AM   Result Value Ref Range    WBC Count 7.5 4.0 - 11.0 10e3/uL    RBC Count 3.43 (L) 3.80 - 5.20 10e6/uL    Hemoglobin 9.6 (L) 11.7 - 15.7 g/dL    Hematocrit 31.1 (L) 35.0 - 47.0 %    MCV 91 78 - 100 fL    MCH 28.0 26.5 - 33.0 pg    MCHC 30.9 (L) 31.5 - 36.5 g/dL    RDW 13.9 10.0 - 15.0 %    Platelet Count 198 150 - 450 10e3/uL    % Neutrophils 73 %    % Lymphocytes 17 %    % Monocytes 7 %    % Eosinophils 3 %    % Basophils 0 %    % Immature Granulocytes 0 %    NRBCs per 100 WBC 0 <1 /100    Absolute Neutrophils 5.4 1.6 - 8.3 10e3/uL    Absolute Lymphocytes 1.3 0.8 - 5.3 10e3/uL    Absolute Monocytes 0.6 0.0 - 1.3 10e3/uL    Absolute Eosinophils 0.2 0.0 - 0.7 10e3/uL    Absolute Basophils 0.0 0.0 - 0.2 10e3/uL    Absolute Immature Granulocytes 0.0 <=0.4 10e3/uL    Absolute NRBCs 0.0 10e3/uL   Basic metabolic panel    Collection Time: 12/08/23 12:26 PM   Result Value Ref Range    Sodium 137 135 - 145 mmol/L    Potassium 4.6 3.4 - 5.3 mmol/L    Chloride 98 98 - 107 mmol/L    Carbon Dioxide (CO2) 30 (H) 22 - 29 mmol/L    Anion Gap 9 7 - 15 mmol/L    Urea Nitrogen 32.4 (H) 8.0 - 23.0 mg/dL    Creatinine 0.96 (H) 0.51 - 0.95 mg/dL    GFR Estimate 60 (L)  >60 mL/min/1.73m2    Calcium 10.1 8.8 - 10.2 mg/dL    Glucose 185 (H) 70 - 99 mg/dL   Hemoglobin A1c    Collection Time: 23 12:26 PM   Result Value Ref Range    Hemoglobin A1C 7.6 (H) <5.7 %   INR    Collection Time: 23 12:26 PM   Result Value Ref Range    INR 2.73 (H) 0.85 - 1.15      [id number]        Revised Cardiac Risk Index (RCRI):  The patient has the following serious cardiovascular risks for perioperative complications:   - Congestive Heart Failure (pulmonary edema, PND, s3 marsha, CXR with pulmonary congestion, basilar rales) = 1 point   - Diabetes Mellitus (on Insulin) = 1 point     RCRI Interpretation: 2 points: Class III (moderate risk - 6.6% complication rate)     Preoperative Review of :   reviewed - controlled substances reflected in medication list.      Signed Electronically by: MOE Fitch CNP  Copy of this evaluation report is provided to requesting physician.

## 2023-12-18 ENCOUNTER — TELEPHONE (OUTPATIENT)
Dept: OTHER | Facility: CLINIC | Age: 78
End: 2023-12-18

## 2023-12-18 ENCOUNTER — TRANSITIONAL CARE UNIT VISIT (OUTPATIENT)
Dept: GERIATRICS | Facility: CLINIC | Age: 78
End: 2023-12-18
Payer: COMMERCIAL

## 2023-12-18 VITALS
BODY MASS INDEX: 37.83 KG/M2 | RESPIRATION RATE: 18 BRPM | OXYGEN SATURATION: 97 % | SYSTOLIC BLOOD PRESSURE: 119 MMHG | WEIGHT: 235.4 LBS | DIASTOLIC BLOOD PRESSURE: 69 MMHG | TEMPERATURE: 97.9 F | HEIGHT: 66 IN | HEART RATE: 90 BPM

## 2023-12-18 DIAGNOSIS — E11.621 DIABETIC ULCER OF LEFT HEEL ASSOCIATED WITH TYPE 2 DIABETES MELLITUS, WITH NECROSIS OF BONE (H): ICD-10-CM

## 2023-12-18 DIAGNOSIS — Z01.818 PRE-OP EXAM: Primary | ICD-10-CM

## 2023-12-18 DIAGNOSIS — M86.672 OTHER CHRONIC OSTEOMYELITIS OF LEFT FOOT (H): ICD-10-CM

## 2023-12-18 DIAGNOSIS — Z89.512 STATUS POST BELOW-KNEE AMPUTATION OF LEFT LOWER EXTREMITY (H): ICD-10-CM

## 2023-12-18 DIAGNOSIS — L97.424 DIABETIC ULCER OF LEFT HEEL ASSOCIATED WITH TYPE 2 DIABETES MELLITUS, WITH NECROSIS OF BONE (H): ICD-10-CM

## 2023-12-18 PROCEDURE — 99310 SBSQ NF CARE HIGH MDM 45: CPT | Performed by: NURSE PRACTITIONER

## 2023-12-18 NOTE — TELEPHONE ENCOUNTER
----- Message from Vanita Carter NP sent at 12/18/2023 10:23 AM CST -----  Needs wound vac for procedure    Chart review: procedure is on 1/2/24 with Dr. Otto.     Procedure Laterality Anesthesia   APPLICATION OF SPLIT-THICKNESS SKIN GRAFT TO LEFT BELOW KNEE STUMP, GRAFT FROM LEFT THIGH Left General   WOUND VAC APPLICATION TO LEFT BELOW KNEE STUMP Left General     3M/[facility] wound vac order......  on ....

## 2023-12-18 NOTE — LETTER
12/18/2023        RE: Linda Loredo  [address]  [address]  [facility] [address]        [facility] GERIATRICS  [address] [place] [address]  Phone: [phone]  Fax: [phone]  Primary Provider: Ish Brown  Pre-op Performing Provider: ANNIKA ALMAZAN    PREOPERATIVE EVALUATION:  Today's date: 12/18/2023    Margaret is a 78 year old, presenting for the following:  Pre-Op Exam      Surgical Information:  Surgery/Procedure: Application of left split thickness skin graft to left below knee stump; graft from left thigh; wound vac application to left below knee stump  Surgery Location: [facility]  Surgeon: Dr Leta Otto  Surgery Date: 1/2/24  Time of Surgery: 0930a  Where patient plans to recover: At a TCU (Transitional Care Unit)  Fax number for surgical facility: Note does not need to be faxed, will be available electronically in Epic.    Patient has a Health Care Directive or Living Will:  YES see file      Assessment & Plan    The proposed surgical procedure is considered INTERMEDIATE risk.    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      Risks and Recommendations:  The patient has the following additional risks and recommendations for perioperative complications:   - Consult Hospitalist / IM to assist with post-op medical management   - Morbid obesity (BMI >40)    Diabetes:  - Patient is on insulin therapy; diabetic NPO guidelines provided and discussed.  Pulmonary:    - Incentive spirometry post-op  Obstructive Sleep Apnea:   Remains noncompliant with CPAP  Anemia/Bleeding/Clotting:    - Anemia and does not require  Mother of patient called to another ER and was able to find a quicker wait time. Mother of patient will be leaving to another ER.    treatment prior to surgery. Monitor hemoglobin postoperatively  Infection:    - Patient has a history of MRSA    Antiplatelet or Anticoagulation Medication Instructions:   - aspirin: Bleeding risk is low for this procedure and daily aspirin may be continued without modification.    - warfarin: Thromboembolic risk is low (<4%/year). HOLD warfarin for 5 days without bridging before procedure.     Additional Medication Instructions:  May continue all scheduled medications EXCEPT:   - Diuretics: May continue due to heart failure.   - Statins: Continue taking on the day of surgery.    - Long acting insulin (e.g. glargine, detemir): Take 80% of the usual evening or morning dose before surgery of tresiba. Give 10units subcutaneous day of surgery x 1   - short acting insulin (e.g. regular, lispro, aspart): HOLD on the morning of surgery.    - fiber, laxatives: HOLD day of surgery and  - loperamide, diphenoxylate/atropine: HOLD day of surgery.     - Herbal medications and vitamins: HOLD 14 days prior to surgery. (HOLD MVI AND VITAMIN C)- HOLD starting 12/19/23 until further notice    -Topicals: HOLD morning of surgery    Start NPO status on 1/3/24 at 1100PM, may have clear fluids until 0500.   Shower with hibiclens night prior to surgery and morning of surgery as directed    RECOMMENDATION:  APPROVAL GIVEN to proceed with proposed procedure, without further diagnostic evaluation.     Subjective      HPI related to upcoming procedure: S/p debridement 9/26/23. S/p Left guillotine BKA on 10/7/23. S/p tibial/fibular resection and debridement of stump 10/17/23. Followed by vascular. Completed course of meropenem/vancomycin 10/8 for osteomyelitis. Planning for graft flap closure with wound vac placement needs on 1/2/24.     Health Care Directive:  Patient has a Health Care Directive on file    {PREOP QUESTIONNAIRE OPTIONS(by MA):461674::    1.NO- BKA to left leg hx - Do you have a history of heart attack, stroke, stent, bypass or  "surgery on an artery in the head, neck, heart or legs?\"  2. NO Do you ever have any pain or discomfort in your chest?\",\"  3. YES CHF present with torsemide treatment. Do you have a history of  Heart Failure?\",  4. NOAre you troubled by shortness of breath when: walking on the level, up a slight hill or at night?\",  5. NO Do you currently have a cold, bronchitis or other respiratory infection?\",  6. NODo you have a cough, shortness of breath or wheezing?\",  7. NODo you sometimes get pains in the calves of your legs when you walk?\",  8. NODo you or anyone in your family have previous history of blood clots?\",  9. NODo you or does anyone in your family have a serious bleeding problem such as prolonged bleeding following surgeries or cuts?\",  10.NOHave you ever had problems with anemia or been told to take iron pills?\",  11. NO Have you had any abnormal blood loss such as black, tarry or bloody stools, or abnormal vaginal bleeding?\",  12. YES- No acute concerns. Have you ever had a blood transfusion?\",  13. NOHave you or any of your relatives ever had problems with anesthesia?\",  14. YES BERLIN present, but noncompliant with CPAP use. Do you have sleep apnea, excessive snoring or daytime drowsiness?\",  15. NODo you have any prosthetic heart valves?\",  16. NO- Do you have prosthetic joints?    Health Care Directive:  Patient has a Health Care Directive on file      Preoperative Review of :   reviewed - controlled substances reflected in medication list.    Status of Chronic Conditions:  See problem list for active medical problems.  Problems all longstanding and stable, except as noted/documented.  See ROS for pertinent symptoms related to these conditions.    A-FIB - Patient has a longstanding history of chronic A-fib currently on rate control. Current treatment regimen includes Warfarin and Aspirin for stroke prevention and denies significant symptoms of lightheadedness, palpitations or dyspnea.     CHF - Patient " has a longstanding history of moderate-severe CHF. Exacerbating conditions include hypertension, COPD, and atrial fibrilation. Currently the patient's condition is improving. Current treatment regimen includes ASA and diuretic. The patient denies chest pain, edema, orthopnea, SOB or recent weight gain. Last Echocardiogram 5/2022 (see results below) , EKG on 12/16/23: see results below            COPD - Patient has a longstanding history of moderate-severe COPD . Patient has been doing well overall noting NO SYMPTOMS with no treatment course except for diuretic daily use without adverse reactions or side effects.    DIABETES - Patient has a longstanding history of DiabetesType Type II . Patient is being treated with diet, exercise, insulin injections, and ASA and denies significant side effects. Control has been good. Complicating factors include but are not limited to: hypertension, hyperlipidemia, foot ulcer, chronic kidney disease, and morbid obesity .     HYPERLIPIDEMIA - Patient has a long history of significant Hyperlipidemia requiring medication for treatment with recent good control. Patient reports no problems or side effects with the medication.     HYPERTENSION - Patient has longstanding history of HTN , currently denies any symptoms referable to elevated blood pressure. Specifically denies chest pain, palpitations, dyspnea, orthopnea, PND or peripheral edema. Blood pressure readings have been in normal range. Current medication regimen is as listed below. Patient denies any side effects of medication.     RENAL INSUFFICIENCY - Patient has a longstanding history of moderate-severe chronic renal insufficiency. Last Cr 0.96 on 12/8/23.     Review of Systems  CONSTITUTIONAL: NEGATIVE for fever, chills, change in weight  INTEGUMENTARY/SKIN: Present for lesions to RLE along with stump wound   EYES: NEGATIVE for vision changes or irritation  ENT/MOUTH: NEGATIVE for ear, mouth and throat problems  RESP: NEGATIVE  for significant cough or SOB  CV: NEGATIVE for chest pain, palpitations or peripheral edema  GI: NEGATIVE for nausea, abdominal pain, heartburn, or change in bowel habits  : NEGATIVE for frequency, dysuria, or hematuria  MUSCULOSKELETAL: NEGATIVE for significant arthralgias or myalgia  NEURO: NEGATIVE for weakness, dizziness or paresthesias  ENDOCRINE: NEGATIVE for temperature intolerance, skin/hair changes  HEME: NEGATIVE for bleeding problems  PSYCHIATRIC: NEGATIVE for changes in mood or affect    Patient Active Problem List    Diagnosis Date Noted     Chronic right-sided heart failure (H) 11/16/2023     Priority: Medium     Osteomyelitis (H) 10/24/2023     Priority: Medium     Diabetic foot ulcer (H) 09/29/2023     Priority: Medium     History of atrial fibrillation 09/22/2023     Priority: Medium     Non-healing wound of left heel 09/22/2023     Priority: Medium     Unable to care for self 09/22/2023     Priority: Medium     Cellulitis - bilateral lower extremities 05/27/2023     Priority: Medium     Decubitus ulcers - 5 present on buttocks/perineal region, numerous scabbed over and unstagable on shin and bilateral feet with some bloody blisters noted on feet 05/27/2023     Priority: Medium     Warfarin-induced coagulopathy  05/27/2023     Priority: Medium     Hypoglycemia 05/27/2023     Priority: Medium     Supratherapeutic INR 05/27/2023     Priority: Medium     Cellulitis of buttock 05/26/2023     Priority: Medium     Sepsis without acute organ dysfunction, due to unspecified organism (H) 05/26/2023     Priority: Medium     UTI (urinary tract infection) - possible 05/26/2023     Priority: Medium     GUSTAVO (acute kidney injury) (H24) 05/26/2023     Priority: Medium     Dehydration 05/26/2023     Priority: Medium     Weakness 05/02/2023     Priority: Medium     Opioid dependence, uncomplicated (H) 04/26/2023     Priority: Medium     Abscess of left foot 10/31/2022     Priority: Medium     Vitamin D deficiency  09/08/2022     Priority: Medium     Anticoagulation monitoring, INR range 2-3 07/06/2022     Priority: Medium     Paroxysmal atrial fibrillation (H) 05/30/2022     Priority: Medium     Formatting of this note might be different from the original.  New onset during 5/2022 admission       Diabetic ulcer of left heel associated with type 2 diabetes mellitus, with muscle involvement without evidence of necrosis (H) 05/17/2022     Priority: Medium     Obesity, Class III, BMI 40-49.9 (morbid obesity) (H) 05/05/2022     Priority: Medium     Venous stasis ulcers of both lower extremities (H) 01/13/2022     Priority: Medium     Bilateral lower extremity edema 05/29/2019     Priority: Medium     Systolic murmur 05/29/2019     Priority: Medium     Umbilical hernia without obstruction and without gangrene 12/13/2018     Priority: Medium     Microalbuminuria due to type 2 diabetes mellitus (H) 10/25/2016     Priority: Medium     Osteopenia 05/20/2015     Priority: Medium     Primary hyperparathyroidism (H24) 01/23/2013     Priority: Medium     Formatting of this note might be different from the original.  work up January 2013 Dr. Villareal       Hypotension 10/27/2011     Priority: Medium     Fibromyalgia 10/20/2011     Priority: Medium     Anxiety 10/20/2011     Priority: Medium     Chronic kidney disease 10/20/2011     Priority: Medium     Problem list name updated by automated process. Provider to review       Elevated liver enzymes 10/20/2011     Priority: Medium     Fracture of fibula, distal, left, closed 10/20/2011     Priority: Medium     ORIF 10/13/11       COPD (chronic obstructive pulmonary disease) (H) 04/26/2010     Priority: Medium     Chronic pain 04/26/2010     Priority: Medium     Allergic rhinitis 04/08/2008     Priority: Medium     Major depressive disorder, recurrent episode, moderate (H) 04/08/2008     Priority: Medium     Mixed hyperlipidemia due to type 2 diabetes mellitus (H) 04/08/2008     Priority:  Medium     Formatting of this note is different from the original.    22 ASCVD 10 year risk: 37.9%     Last Lipids:  Last Lipids:  Chol: 2021 130   63  HDL: 2021 46  Non-HDL: 2021 84  Chol/HDL Ratio: 2021 2.83  LDL DIRECT: . No results found in past 5 years   LDL CHOLESTEROL (mg/dL)   Date Value   2021 71     22   The 10-year ASCVD risk score (Teddy SMITH Jr., et al., 2013) is: 37.9%    Values used to calculate the score:      Age: 77 years      Sex: Female      Is Non- : No      Diabetic: Yes      Tobacco smoker: No      Systolic Blood Pressure: 118 mmHg      Is BP treated: Yes      HDL Cholesterol: 46 mg/dL      Total Cholesterol: 130 mg/dL       Hypersomnia with sleep apnea 10/23/2007     Priority: Medium     Sleep study 2004 showing severe OBSTRUCTIVE SLEEP APNEA and recommend CPAP  Problem list name updated by automated process. Provider to review       Generalized osteoarthrosis, unspecified site 2006     Priority: Medium     2007: Linda felt that Naprosyn was not helpful.       Routine general medical examination at a health care facility 2006     Priority: Medium     Mammogram:  Mammogram due 07.  Pap: 2007 done.  Colonoscopy: scheduled .  Lipids: 2006: CHOL 132, HDL  40,LDL 77,TRIG 78,  Influenza vaccine: 10/06  Pneuovax  Vaccine:   Adacel  Vaccine:        Diabetic polyneuropathy associated with type 2 diabetes mellitus (H) 2006     Priority: Medium     2007: on gabapentin.  She has been switched from gabapentin to lyrica.  Problem list name updated by automated process. Provider to review        OBESITY 2006     Priority: Medium     2007: Body mass index is 48.07 kg/(m^2).        Mixed hyperlipidemia 2005     Priority: Medium     Lipids: 2006: CHOL 132, HDL  40,LDL 77,TRIG 78,  2007: taking: Zetia and  atorvastatin.       Insomnia 07/15/2005     Priority: Medium     February 26, 2007: see by Dr. Car. On ambien and trazadone.  Problem list name updated by automated process. Provider to review       Herpes zoster 06/20/2005     Priority: Medium     February 26, 2007: On gabapentin and lidoderm.   She has been switched from gabapentin to lyrica.  Problem list name updated by automated process. Provider to review       DEPRESSION 05/09/2005     Priority: Medium     February 26, 2007: Followed by Dr. Car. currently on: sertraline, bupropion, trazadone and ambilify.  2/28/08: Margaret was asked to speak with her psychiatrist about her psychiatric medications.       Essential hypertension with goal blood pressure less than 140/90 05/09/2005     Priority: Medium     Cardiology referral:   February 26, 2007: taking: losartan, lisinopril, labetalol, aspirin, and furosemide.  Problem list name updated by automated process. Provider to review    Formatting of this note is different from the original.    BP Readings from Last 1 Encounters:   07/11/22 118/66     CREATININE (mg/dL)   Date Value   06/04/2022 0.89     ALBUMIN TO CREATININE RATIO,RAND UR      (mg/g creat)   Date Value   11/12/2009 25.7       combination of stress and urge URINARY INCONTINENCE 05/09/2005     Priority: Medium     February 26, 2007: On Detrol LA.       Type 2 diabetes mellitus with complication, with long-term current use of insulin (H) 04/18/2005     Priority: Medium     11/05: ophthalmology=Dr. German, exam 11/05 no evidence of diabetic retinopathy.  February 26, 2007: Poor control A1C      8.6   12/22/2006.   Medications: insulin pump: Humulog (Basal rate is 1.8 units and 1/2 usual meal bolus of insulin), Byetta, metformin. A CGMS (of insulin pump) is currently being done with diabetic educator Rosemary Pendleton.    Endocrine Consult at [facility]: see scanned notes for details. Linda has not followed up with them as of yet.  Problem list name  updated by automated process. Provider to review    Formatting of this note is different from the original.  diagnosis 2001    HEMOGLOBIN A1C MONITORING (POCT) (%)   Date Value   05/28/2022 7.4 (H)   06/17/2021 7.1 (H)        Past Medical History:   Diagnosis Date     Depressive disorder, not elsewhere classified      Herpes zoster without mention of complication      Hypercalcemia 2/24/2007     Mild intermittent asthma      Other urinary incontinence      Type II or unspecified type diabetes mellitus with renal manifestations, uncontrolled(250.42) (H)      Type II or unspecified type diabetes mellitus without mention of complication, not stated as uncontrolled      Unspecified essential hypertension      Past Surgical History:   Procedure Laterality Date     AMPUTATE LEG BELOW KNEE Left 10/7/2023    Procedure: LEFT GUILLOTINE BELOW KNEE AMPUTATION.;  Surgeon: Lan Otto MD;  Location: [facility] OR     AMPUTATE LEG BELOW KNEE Left 10/14/2023    Procedure: debridement of left below the knee amputation;  Surgeon: Lan Otto MD;  Location: [facility] OR     CHOLECYSTECTOMY       INCISION AND DRAINAGE FOOT, COMBINED Left 9/26/2023    Procedure: Surgical debridement of all nonviable skin and soft tissue and bone left foot;  Surgeon: Nolberto Thorne DPM;  Location: [facility] OR     IR LOWER EXTREMITY ANGIOGRAM LEFT  10/3/2023     ligation of fallopian tube       OPEN REDUCTION INTERNAL FIXATION ANKLE  10/13/11    left     pinning of left 2nd toe       Current Outpatient Medications   Medication Sig Dispense Refill     acetaminophen (TYLENOL) 325 MG tablet Take 2 tablets (650 mg) by mouth every 6 hours as needed for mild pain or other (and adjunct with moderate or severe pain or per patient request)       aspirin 81 MG EC tablet Take 1 tablet (81 mg) by mouth daily       atorvastatin (LIPITOR) 40 MG tablet Take 1 tablet (40 mg) by mouth every evening       bisacodyl (DULCOLAX) 10 MG suppository Place 1 suppository  (10 mg) rectally daily as needed for constipation 30 suppository 0     chlorhexidine (HIBICLENS) 4 % liquid Hibiclens shower night prior to surgery on 1/1/24 AND morning of surgery on 1/2/24 473 mL 0     clindamycin (CLEOCIN) 300 MG capsule Take 1 capsule (300 mg) by mouth 3 times daily for 10 days 30 capsule 0     diphenhydrAMINE (BENADRYL) 25 MG tablet Take 1 tablet (25 mg) by mouth every 6 hours as needed for itching or allergies 60 tablet 3     hydrocortisone 2.5 % cream Apply topically 2 times daily Apply to back, arms and to redness to RLE BID and BID PRN 30 g 11     insulin aspart (NOVOLOG PEN) 100 UNIT/ML pen Inject 1-7 Units Subcutaneous 3 times daily (before meals) 15 mL 0     insulin aspart (NOVOLOG PEN) 100 UNIT/ML pen Inject 1-5 Units Subcutaneous at bedtime 15 mL 0     insulin degludec (TRESIBA) 200 UNIT/ML pen Inject 14 Units Subcutaneous at bedtime Hold for blood glucose less than 100       loperamide (IMODIUM A-D) 2 MG tablet Take 1 tablet (2 mg) by mouth 4 times daily as needed for diarrhea 60 tablet 1     methadone (DOLOPHINE) 10 MG tablet Take 1 tablet (10 mg) by mouth daily Daily at 1500 30 tablet 0     methadone (DOLOPHINE) 5 MG tablet Take 1 tablet (5 mg) by mouth 3 times daily 90 tablet 0     miconazole (MICATIN) 2 % external powder Apply topically 2 times daily Apply to buttock and fungal areas BID and BID PRN 85 g 11     multivitamin w/minerals (THERA-VIT-M) tablet Take 1 tablet by mouth daily       mupirocin (BACTROBAN) 2 % external ointment Apply topically 2 times daily Apply to RLE lesion 3x weekly with lymphedema changes       ondansetron (ZOFRAN ODT) 4 MG ODT tab Take 1 tablet (4 mg) by mouth every 6 hours as needed for nausea or vomiting 21 tablet 0     polyethylene glycol (MIRALAX) 17 GM/Dose powder Take 17 g by mouth 2 times daily as needed for constipation       senna-docusate (SENOKOT-S/PERICOLACE) 8.6-50 MG tablet Take 2 tablets by mouth 2 times daily as needed for constipation    "    torsemide (DEMADEX) 10 MG tablet Take 2 tablets (20 mg) by mouth daily HOLD if SBP<100 60 tablet 11     Warfarin Therapy Reminder Per INR       alteplase (CATHFLO ACTIVASE) 2 MG injection Triple Lumen PICC to RUE. May flush each lumen of 2mg each for blood return needs 3 mL 1     sodium chloride, PF, (SODIUM CHLORIDE FLUSH) 0.9% PF flush Flush each PICC lumen (x3) with 10mL 0.9% sodium chloride daily and PRN before and after use. (Patient not taking: Reported on 12/18/2023) 1000 mL 11     vitamin C (ASCORBIC ACID) 500 MG tablet Take 500 mg by mouth daily (Patient not taking: Reported on 12/18/2023)         Allergies   Allergen Reactions     Prednisone Nausea and Vomiting     Morphine Nausea and Vomiting     Morphine [Fumaric Acid] Nausea and Vomiting        Social History     Tobacco Use     Smoking status: Never     Smokeless tobacco: Never   Substance Use Topics     Alcohol use: No     Family History   Problem Relation Age of Onset     Hypertension Mother      Heart Disease Father         heart attack and cardiomegaly     Diabetes Sister         type 2     Hypertension Sister      Respiratory Sister      Psychotic Disorder Sister         bipolar     Cancer Maternal Grandmother         throat     Cancer Paternal Grandmother         gastric     History   Drug Use No         Objective    /69   Pulse 90   Temp 97.9  F (36.6  C)   Resp 18   Ht 1.676 m (5' 6\")   Wt 106.8 kg (235 lb 6.4 oz)   SpO2 97%   BMI 37.99 kg/m      Physical Exam    GENERAL APPEARANCE: healthy, alert and no distress     EYES: EOMI, PERRL     HENT: ear canals and TM's normal and nose and mouth without ulcers or lesions     NECK: no adenopathy, no asymmetry, masses, or scars and thyroid normal to palpation     RESP: lungs clear to auscultation - no rales, rhonchi or wheezes     CV: regular rates and rhythm, normal S1 S2, no S3 or S4 and no murmur, click or rub     ABDOMEN:  soft, nontender, no HSM or masses and bowel sounds normal   "   MS: extremities normal- no gross deformities noted, no evidence of inflammation in joints, FROM in all extremities.     SKIN: see photo below. Stump wound present to Left stump. Lesions noted to RLE which are acute on chronic-slowly improving     NEURO: Normal strength and tone, sensory exam grossly normal, mentation intact and speech normal     PSYCH: mentation appears normal. and affect normal/bright     LYMPHATICS: No cervical adenopathy                      Recent Labs   Lab Test 12/08/23  1226 12/08/23  0600 11/24/23  1012 09/24/23  0456 09/23/23  0533   HGB  --  9.6* 9.9*   < > 10.7*   PLT  --  198 228   < > 196   INR 2.73*  --  3.50*   < > 4.97*     --  140   < > 140   POTASSIUM 4.6  --  4.5   < > 3.5   CR 0.96*  --  0.98*   < > 1.14*   A1C 7.6*  --   --   --  7.0*    < > = values in this interval not displayed.        Diagnostics:  Recent Results (from the past 240 hour(s))   CBC with platelets and differential    Collection Time: 12/08/23  6:00 AM   Result Value Ref Range    WBC Count 7.5 4.0 - 11.0 10e3/uL    RBC Count 3.43 (L) 3.80 - 5.20 10e6/uL    Hemoglobin 9.6 (L) 11.7 - 15.7 g/dL    Hematocrit 31.1 (L) 35.0 - 47.0 %    MCV 91 78 - 100 fL    MCH 28.0 26.5 - 33.0 pg    MCHC 30.9 (L) 31.5 - 36.5 g/dL    RDW 13.9 10.0 - 15.0 %    Platelet Count 198 150 - 450 10e3/uL    % Neutrophils 73 %    % Lymphocytes 17 %    % Monocytes 7 %    % Eosinophils 3 %    % Basophils 0 %    % Immature Granulocytes 0 %    NRBCs per 100 WBC 0 <1 /100    Absolute Neutrophils 5.4 1.6 - 8.3 10e3/uL    Absolute Lymphocytes 1.3 0.8 - 5.3 10e3/uL    Absolute Monocytes 0.6 0.0 - 1.3 10e3/uL    Absolute Eosinophils 0.2 0.0 - 0.7 10e3/uL    Absolute Basophils 0.0 0.0 - 0.2 10e3/uL    Absolute Immature Granulocytes 0.0 <=0.4 10e3/uL    Absolute NRBCs 0.0 10e3/uL   Basic metabolic panel    Collection Time: 12/08/23 12:26 PM   Result Value Ref Range    Sodium 137 135 - 145 mmol/L    Potassium 4.6 3.4 - 5.3 mmol/L    Chloride 98 98  - 107 mmol/L    Carbon Dioxide (CO2) 30 (H) 22 - 29 mmol/L    Anion Gap 9 7 - 15 mmol/L    Urea Nitrogen 32.4 (H) 8.0 - 23.0 mg/dL    Creatinine 0.96 (H) 0.51 - 0.95 mg/dL    GFR Estimate 60 (L) >60 mL/min/1.73m2    Calcium 10.1 8.8 - 10.2 mg/dL    Glucose 185 (H) 70 - 99 mg/dL   Hemoglobin A1c    Collection Time: 23 12:26 PM   Result Value Ref Range    Hemoglobin A1C 7.6 (H) <5.7 %   INR    Collection Time: 23 12:26 PM   Result Value Ref Range    INR 2.73 (H) 0.85 - 1.15      EK23        Revised Cardiac Risk Index (RCRI):  The patient has the following serious cardiovascular risks for perioperative complications:   - Congestive Heart Failure (pulmonary edema, PND, s3 marsha, CXR with pulmonary congestion, basilar rales) = 1 point   - Diabetes Mellitus (on Insulin) = 1 point     RCRI Interpretation: 2 points: Class III (moderate risk - 6.6% complication rate)     Preoperative Review of :   reviewed - controlled substances reflected in medication list.      Signed Electronically by: MOE Fitch CNP  Copy of this evaluation report is provided to requesting physician.      Sincerely,        MOE Fitch CNP

## 2023-12-21 ENCOUNTER — LAB REQUISITION (OUTPATIENT)
Dept: LAB | Facility: CLINIC | Age: 78
End: 2023-12-21
Payer: COMMERCIAL

## 2023-12-21 DIAGNOSIS — D64.9 ANEMIA, UNSPECIFIED: ICD-10-CM

## 2023-12-21 DIAGNOSIS — I15.2 HYPERTENSION SECONDARY TO ENDOCRINE DISORDERS: ICD-10-CM

## 2023-12-21 DIAGNOSIS — N18.30 CHRONIC KIDNEY DISEASE, STAGE 3 UNSPECIFIED (H): ICD-10-CM

## 2023-12-21 DIAGNOSIS — I48.0 PAROXYSMAL ATRIAL FIBRILLATION (H): ICD-10-CM

## 2023-12-21 NOTE — PROGRESS NOTES
"[facility] GERIATRICS  [place] Medical Record Number:  [id number]  Place of Service where encounter took place: [facility] (TCU)([facility]) [[id number]]    HPI:    Linda Loredo is a 78 year old  (1945), who is being seen today for an episodic care visit at the above location. Today's concern is INR/Coumadin management for NICO Rolle    ROS/Subjective:  Bleeding Signs/Symptoms:  None  Thromboembolic Signs/Symptoms:  None  Medication Changes:  No  Dietary Changes:  No  Activity Changes: No  Bacterial/Viral Infection:  No  Missed Coumadin Doses:  None  On ASA: Yes - 81 mg po q day  Other Concerns:  No    OBJECTIVE:  /75   Pulse 85   Temp 97.9  F (36.6  C)   Resp 18   Ht 1.676 m (5' 6\")   Wt 106.7 kg (235 lb 3.2 oz)   SpO2 100%   BMI 37.96 kg/m    Last INR: 3.10 on 12/15/23  INR Today:  2.30 via fingerstick  Current Dose:  Coumadin 1.5mg on mon/thurs and 2mg AOD      ASSESSMENT:     Paroxysmal atrial fibrillation (H)  Warfarin-induced coagulopathy   Long term (current) use of anticoagulants  Encounter for therapeutic drug monitoring  Therapeutic INR for goal of 2-3    PLAN/ORDERS:   New Dose: Continue coumadin 1.5mg on mon/thurs and 2mg AOD x 1 week. due to upcoming surgery will need to stop coumadin starting 12/29/23    Next INR: 12/29/23      Electronically signed by:  Dr. Haylie Rowe DNP, APRN, FNP-C, WCS-C, EDS-C      "

## 2023-12-22 ENCOUNTER — TRANSITIONAL CARE UNIT VISIT (OUTPATIENT)
Dept: GERIATRICS | Facility: CLINIC | Age: 78
End: 2023-12-22
Payer: COMMERCIAL

## 2023-12-22 VITALS
RESPIRATION RATE: 18 BRPM | HEIGHT: 66 IN | SYSTOLIC BLOOD PRESSURE: 119 MMHG | HEART RATE: 85 BPM | WEIGHT: 235.2 LBS | TEMPERATURE: 97.9 F | DIASTOLIC BLOOD PRESSURE: 75 MMHG | BODY MASS INDEX: 37.8 KG/M2 | OXYGEN SATURATION: 100 %

## 2023-12-22 DIAGNOSIS — Z79.01 LONG TERM (CURRENT) USE OF ANTICOAGULANTS: ICD-10-CM

## 2023-12-22 DIAGNOSIS — T45.515A WARFARIN-INDUCED COAGULOPATHY (H): ICD-10-CM

## 2023-12-22 DIAGNOSIS — F11.20 OPIOID DEPENDENCE, UNCOMPLICATED (H): ICD-10-CM

## 2023-12-22 DIAGNOSIS — D68.32 WARFARIN-INDUCED COAGULOPATHY (H): ICD-10-CM

## 2023-12-22 DIAGNOSIS — I48.0 PAROXYSMAL ATRIAL FIBRILLATION (H): Primary | ICD-10-CM

## 2023-12-22 DIAGNOSIS — Z51.81 ENCOUNTER FOR THERAPEUTIC DRUG MONITORING: ICD-10-CM

## 2023-12-22 PROCEDURE — 99308 SBSQ NF CARE LOW MDM 20: CPT | Performed by: NURSE PRACTITIONER

## 2023-12-22 RX ORDER — METHADONE HYDROCHLORIDE 5 MG/1
5 TABLET ORAL 3 TIMES DAILY
Qty: 90 TABLET | Refills: 0 | Status: SHIPPED | OUTPATIENT
Start: 2023-12-22 | End: 2023-12-27

## 2023-12-22 RX ORDER — METHADONE HYDROCHLORIDE 10 MG/1
10 TABLET ORAL DAILY
Qty: 30 TABLET | Refills: 0 | Status: SHIPPED | OUTPATIENT
Start: 2023-12-22 | End: 2023-12-27

## 2023-12-22 NOTE — LETTER
"    12/22/2023        RE: Linda Loredo  [address]  [address]  [facility] [address]        [facility]  [place] Medical Record Number:  [id number]  Place of Service where encounter took place: [facility] (TCU)([facility]) [[id number]]    HPI:    Linda Loredo is a 78 year old  (1945), who is being seen today for an episodic care visit at the above location. Today's concern is INR/Coumadin management for NICO Fib    ROS/Subjective:  Bleeding Signs/Symptoms:  None  Thromboembolic Signs/Symptoms:  None  Medication Changes:  No  Dietary Changes:  No  Activity Changes: No  Bacterial/Viral Infection:  No  Missed Coumadin Doses:  None  On ASA: Yes - 81 mg po q day  Other Concerns:  No    OBJECTIVE:  /75   Pulse 85   Temp 97.9  F (36.6  C)   Resp 18   Ht 1.676 m (5' 6\")   Wt 106.7 kg (235 lb 3.2 oz)   SpO2 100%   BMI 37.96 kg/m    Last INR: 3.10 on 12/15/23  INR Today:  2.30 via fingerstick  Current Dose:  Coumadin 1.5mg on mon/thurs and 2mg AOD      ASSESSMENT:     Paroxysmal atrial fibrillation (H)  Warfarin-induced coagulopathy   Long term (current) use of anticoagulants  Encounter for therapeutic drug monitoring  Therapeutic INR for goal of 2-3    PLAN/ORDERS:   New Dose: Continue coumadin 1.5mg on mon/thurs and 2mg AOD x 1 week. due to upcoming surgery will need to stop coumadin starting 12/29/23    Next INR: 12/29/23      Electronically signed by:  Dr. Haylie Rowe DNP, APRN, FNP-C, WCS-C, EDS-C          Sincerely,        Haylie Rowe, MOE CNP      "

## 2023-12-22 NOTE — TELEPHONE ENCOUNTER
Wound vac order pended.  Unable to complete until procedure is within 1 week.  Will complete order on 12/27/23.  JOHAN CoelhoN, RN, CV-[facility]

## 2023-12-25 ENCOUNTER — LAB REQUISITION (OUTPATIENT)
Dept: LAB | Facility: CLINIC | Age: 78
End: 2023-12-25
Payer: COMMERCIAL

## 2023-12-25 DIAGNOSIS — I15.1 HYPERTENSION SECONDARY TO OTHER RENAL DISORDERS: ICD-10-CM

## 2023-12-25 DIAGNOSIS — D64.9 ANEMIA, UNSPECIFIED: ICD-10-CM

## 2023-12-26 VITALS
HEART RATE: 72 BPM | SYSTOLIC BLOOD PRESSURE: 130 MMHG | BODY MASS INDEX: 37.8 KG/M2 | TEMPERATURE: 97.9 F | HEIGHT: 66 IN | WEIGHT: 235.2 LBS | RESPIRATION RATE: 17 BRPM | OXYGEN SATURATION: 98 % | DIASTOLIC BLOOD PRESSURE: 78 MMHG

## 2023-12-26 NOTE — PROGRESS NOTES
[facility] GERIATRICS    Chief Complaint   Patient presents with    RECHECK     HPI:  Linda Loredo is a 78 year old  (1945), who is being seen today for an episodic care visit at: [facility] (U)([facility]) [[id number]]. Today's concern is: The primary encounter diagnosis was Paroxysmal atrial fibrillation (H). Diagnoses of Warfarin-induced coagulopathy , Opioid dependence, uncomplicated (H), Diabetic ulcer of left heel associated with type 2 diabetes mellitus, with necrosis of bone (H), Other chronic osteomyelitis of left foot (H), 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), Essential hypertension, Dyslipidemia, Stage 3a chronic kidney disease (H), Chronic right-sided heart failure (H), Type 2 diabetes mellitus with diabetic polyneuropathy, with long-term current use of insulin (H), Severe obesity (H), Cellulitis of right lower extremity, Venous stasis, Lymphedema, Status post debridement, Redness, Hx of BKA, left (H), Essential hypertension with goal blood pressure less than 140/90, Skin lesion, Acute blood loss anemia, PAD (peripheral artery disease) (H24), PICC (peripherally inserted central catheter) flush, Primary hyperparathyroidism (H24), Chronic obstructive pulmonary disease, unspecified COPD type (H), Mild intermittent asthma without complication, Other chronic pain, and Major depressive disorder, recurrent episode, moderate (H) were also pertinent to this visit.    Met with patient who denies any chest pain, palpitations, shortness of breath, RASHID, lightheadedness, dizziness, or cough. Denies any abdominal discomfort. Denies N&V. Denies B&B concerns. Denies dysuria or frequency. Denies loose or constipation. Appetite good. Sleeping well. She reports getting ready for her surgery next week. Staff and HUC are all aware and are in process of obtaining appropriate transportation needs. Pre op completed 12/18. Last day of coumadin is planned for  "12/28. INR due 12/29. She reports wound to stump area is getting smaller. Wounds to RLE are scabbing over and slowly healing as well. Pain stable with current methadone orders. Continues to have PICC in place. Flushes well, but not able to get blood return.     BP Readings from Last 3 Encounters:   12/26/23 130/78   12/22/23 119/75   12/18/23 119/69     Wt Readings from Last 5 Encounters:   12/26/23 106.7 kg (235 lb 3.2 oz)   12/22/23 106.7 kg (235 lb 3.2 oz)   12/18/23 106.8 kg (235 lb 6.4 oz)   12/15/23 106.5 kg (234 lb 12.8 oz)   12/11/23 107.2 kg (236 lb 6.4 oz)     Allergies, and PMH/PSH reviewed in EPIC today.  REVIEW OF SYSTEMS:  4 point ROS including Respiratory, CV, GI and , other than that noted in the HPI,  is negative    Objective:   /78   Pulse 72   Temp 97.9  F (36.6  C)   Resp 17   Ht 1.676 m (5' 6\")   Wt 106.7 kg (235 lb 3.2 oz)   SpO2 98%   BMI 37.96 kg/m    GENERAL APPEARANCE:  Alert, in no distress, oriented, morbidly obese, cooperative  ENT:  Mouth and posterior oropharynx normal, moist mucous membranes, normal hearing acuity  EYES:  EOM, conjunctivae, lids, pupils and irises normal  NECK:  No adenopathy,masses or thyromegaly  RESP:  respiratory effort and palpation of chest normal, lungs clear to auscultation , no respiratory distress  CV:  Palpation and auscultation of heart done , regular rate and rhythm, no murmur, rub, or gallop  ABDOMEN:  normal bowel sounds, soft, nontender, no hepatosplenomegaly or other masses, no guarding or rebound  M/S:   Slide board transfers. Wheelchair bound. Independent with propelling.   SKIN:  wound healing well, no signs of infection slowly resolving. Stump open, pending flap repair surgery   NEURO:   Cranial nerves 2-12 are normal tested and grossly at patient's baseline, no purposeful movement in upper and lower extremities  PSYCH:  oriented X 3, normal insight, judgement and memory, affect and mood normal    Most Recent 3 CBC's:  Recent Labs "   Lab Test 12/27/23  0958 12/08/23  0600 11/24/23  1012   WBC 8.0 7.5 7.0   HGB 10.5* 9.6* 9.9*   MCV 90 91 94    198 228     Most Recent 3 BMP's:  Recent Labs   Lab Test 12/27/23  0958 12/08/23  1226 11/24/23  1012    137 140   POTASSIUM 4.5 4.6 4.5   CHLORIDE 96* 98 101   CO2 32* 30* 28   BUN 40.4* 32.4* 35.2*   CR 0.95 0.96* 0.98*   ANIONGAP 9 9 11   ZAKIA 10.6* 10.1 10.4*   * 185* 228*     Most Recent 2 LFT's:  Recent Labs   Lab Test 11/17/23  1033 11/01/23  0613   AST 20 19   ALT 11 12   ALKPHOS 90 97   BILITOTAL 0.5 0.2     Most Recent 3 INR's:  Recent Labs   Lab Test 12/08/23  1226 11/24/23  1012 11/17/23  1033   INR 2.73* 3.50* 1.69*     Most Recent TSH and T4:  Recent Labs   Lab Test 11/24/23  1012   TSH 3.46   T4 1.20     Most Recent Hemoglobin A1c:  Recent Labs   Lab Test 12/08/23  1226   A1C 7.6*     Most Recent Anemia Panel:  Recent Labs   Lab Test 12/27/23  0958   WBC 8.0   HGB 10.5*   HCT 33.5*   MCV 90          ASSESSMENT/PLAN:  (E11.621,  L97.509,  Z79.4) Type 2 diabetes mellitus with foot ulcer, with long-term current use of insulin (H)  (E66.01) Obesity, Class III, BMI 40-49.9 (morbid obesity) (H)  Comment: Chronic. Last A1c 7% in sept 2023. Results 12/8/23 were 7.6%. Goal <9%.   Plan:   -Monitor Blood Glucose QID as directed  -Continue sliding scale insulin as directed TID with meals and HS  -Continue tresiba 14 units at HS--Give 10Units at HS night prior to surgery as directed  -BMP and CBC due today 12/27/23--results pending  -Trend labs upon surgery return     (I48.0) Paroxysmal atrial fibrillation (H)  (Z79.01) Long term (current) use of anticoagulants  (D68.32,  T45.515A) Warfarin-induced coagulopathy   (I10) Essential hypertension with goal blood pressure less than 140/90  (E78.2) Mixed hyperlipidemia  (N18.30) Stage 3 chronic kidney disease, unspecified whether stage 3a or 3b CKD (H)  (I50.812) Chronic right-sided heart failure (H)  Comment: Chronic. Baseline  Creatinine~ 1-1.2. Based on JNC-8 goals,  patients age of 78 year old, presence of diabetes or CKD, and goals of care goal BP is  <140/90 mm Hg. Patient is stable with current plan of care and routine assessment..Holding lisinopril per chart review per patient wishes. INR goal 2-3.INR via fingerstick on 12/22 was 2.30  Plan:   -Monitor for worsening s/symptoms of concerns  -Monitor BP and HR as directed  -Continue asa 81mg daily  -Continue atorvastatin 40mg daily  -Continue coumadin 1.5mg on Mon/Thurs and 2mg AOD until 12/28/23 INR due 12/29/23. HOLD coumadin starting 12/29 until further notice given surgery next week  -Monitor weights as directed. Baseline weight around 222-224# on admission but now weight noted to be 235#.   -Continue torsemide 20mg daily. HOLD if SBP<100  -BMP and CBC due today 12/27/23--results pending  -Trend labs upon surgery return     (R60.0) Edema of right extremity  (L53.9) Redness  (L03.115) Cellulitis of right lower extremity  Comment: Acute on chronic. Noted increased redness to RLE with warmth and pain soon after admission. Currently on coumadin. US negative for DVT risks. 4 areas areas are present to right leg. 3 anterior and 1 posterior (see photo above for reference). History of completing keflex with good improvement, but now redness persists to anterior portion of calf. Patient reports redness comes and goes daily. Redness was marked with black marker, Offered to start antibiotic course now, however patient wants to wait and monitor for now. I advised her that if redness persists or worsens, then to notify staff and we will start antibiotic treatment right away. In agreement to this plan below.  Unable to start prednisone given allergy risk. Suspect edema may also be contributing to wound concerns to RLE. SLOWLY IMPROVING with 10 day course of clindamycin  Plan:  -Lymphedema therapy to assess and assist with edema control---currently on hold for now per request  -Continue wound  care to RLE for now:   *Cleanse with normal saline. Apply bactroban to open areas BID. Leave NANCY. Offered more intervention with dressings, however she declines at this time.   -Monitor for worsening s/symptoms of concerns  -Continue benadryl every 6 hours PRN  -Continue hydrocortisone cream to apply to back, arms and right leg redness BID and BID PRN  -BMP and CBC due today 12/27/23--results pending  -Trend labs upon surgery return     (E11.621,  L97.425) Diabetic ulcer of left heel associated with type 2 diabetes mellitus, with muscle involvement without evidence of necrosis (H)  (Z89.512) 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)  (Z98.890) Status post debridement  (M86.9) Osteomyelitis of left foot, unspecified type (H)  (F11.20) Opioid dependence, uncomplicated (H)  (G89.4) Chronic pain syndrome  (I83.019,  I83.029,  L97.919,  L97.929) Venous stasis ulcers of both lower extremities (H)  (I89.0) Lymphedema  Comment: Chronic. S/p debridement 9/26/23. S/p Left guillotine BKA on 10/7/23. S/p tibial/fibular resection and debridement of stump 10/17/23. Followed by vascular. Completed course of meropenem/vancomycin 10/8 for osteomyelitis. Surgery scheduled for 1/2/24 per review.   Plan:   -Monitor pain complaints  -In house wound provider to follow for ongoing needs   -Continue methadone 10mg daily at 1500 and 5mg TID scheduled.   -Tylenol PRN  -Continue triple lumen PICC to RUE as directed. Added daily flush and PRN along with dressing weekly changes.   -BMP and CBC due today 12/27/23--results pending  -Trend labs upon surgery return  -Follow up with vascular as directed. Surgery scheduled at [facility] on 1/2/24 at 0930. Needs to arrive 2 hours prior. (PRE-OP COMPLETED 12/18/23)  -Apply hemp lotion per her request to right heel dry skin BID--keep at bedside and staff to apply accordingly  -Continue current wound cares as directed;  *Wound Care - Left BKA : *Change daily* 1. Cleanse w/Vashe soaked  gauze, including sreekanth wound skin, gentle pat dry. 2. Apply skin prep to sreekanth wound skin. 3. Soak Hydrofera Blue with saline and place over wound bed, cover with abd, and wrap with kerlix to secure     Electronically Signed by:  Dr. Haylie Rowe DNP, APRN, FNP-C, WCS-C, EDS-C

## 2023-12-27 ENCOUNTER — TRANSITIONAL CARE UNIT VISIT (OUTPATIENT)
Dept: GERIATRICS | Facility: CLINIC | Age: 78
End: 2023-12-27
Payer: COMMERCIAL

## 2023-12-27 DIAGNOSIS — E11.621 DIABETIC ULCER OF LEFT HEEL ASSOCIATED WITH TYPE 2 DIABETES MELLITUS, WITH NECROSIS OF BONE (H): ICD-10-CM

## 2023-12-27 DIAGNOSIS — Z89.512 HX OF BKA, LEFT (H): ICD-10-CM

## 2023-12-27 DIAGNOSIS — J45.20 MILD INTERMITTENT ASTHMA WITHOUT COMPLICATION: ICD-10-CM

## 2023-12-27 DIAGNOSIS — Z89.512 STATUS POST BELOW-KNEE AMPUTATION OF LEFT LOWER EXTREMITY (H): ICD-10-CM

## 2023-12-27 DIAGNOSIS — I48.0 PAROXYSMAL ATRIAL FIBRILLATION (H): Primary | ICD-10-CM

## 2023-12-27 DIAGNOSIS — I73.9 PAD (PERIPHERAL ARTERY DISEASE) (H): ICD-10-CM

## 2023-12-27 DIAGNOSIS — L53.9 REDNESS: ICD-10-CM

## 2023-12-27 DIAGNOSIS — L97.424 DIABETIC ULCER OF LEFT HEEL ASSOCIATED WITH TYPE 2 DIABETES MELLITUS, WITH NECROSIS OF BONE (H): ICD-10-CM

## 2023-12-27 DIAGNOSIS — E11.42 TYPE 2 DIABETES MELLITUS WITH DIABETIC POLYNEUROPATHY, WITH LONG-TERM CURRENT USE OF INSULIN (H): ICD-10-CM

## 2023-12-27 DIAGNOSIS — Z98.890 STATUS POST DEBRIDEMENT: ICD-10-CM

## 2023-12-27 DIAGNOSIS — Z79.4 TYPE 2 DIABETES MELLITUS WITH DIABETIC POLYNEUROPATHY, WITH LONG-TERM CURRENT USE OF INSULIN (H): ICD-10-CM

## 2023-12-27 DIAGNOSIS — N18.31 STAGE 3A CHRONIC KIDNEY DISEASE (H): ICD-10-CM

## 2023-12-27 DIAGNOSIS — E78.5 DYSLIPIDEMIA: ICD-10-CM

## 2023-12-27 DIAGNOSIS — I10 ESSENTIAL HYPERTENSION WITH GOAL BLOOD PRESSURE LESS THAN 140/90: ICD-10-CM

## 2023-12-27 DIAGNOSIS — J44.9 CHRONIC OBSTRUCTIVE PULMONARY DISEASE, UNSPECIFIED COPD TYPE (H): ICD-10-CM

## 2023-12-27 DIAGNOSIS — D62 ACUTE BLOOD LOSS ANEMIA: ICD-10-CM

## 2023-12-27 DIAGNOSIS — I87.8 VENOUS STASIS: ICD-10-CM

## 2023-12-27 DIAGNOSIS — D68.32 WARFARIN-INDUCED COAGULOPATHY (H): ICD-10-CM

## 2023-12-27 DIAGNOSIS — I89.0 LYMPHEDEMA: ICD-10-CM

## 2023-12-27 DIAGNOSIS — I10 ESSENTIAL HYPERTENSION: ICD-10-CM

## 2023-12-27 DIAGNOSIS — L03.115 CELLULITIS OF RIGHT LOWER EXTREMITY: ICD-10-CM

## 2023-12-27 DIAGNOSIS — E21.0 PRIMARY HYPERPARATHYROIDISM (H): ICD-10-CM

## 2023-12-27 DIAGNOSIS — G89.29 OTHER CHRONIC PAIN: ICD-10-CM

## 2023-12-27 DIAGNOSIS — E66.01 SEVERE OBESITY (H): ICD-10-CM

## 2023-12-27 DIAGNOSIS — T45.515A WARFARIN-INDUCED COAGULOPATHY (H): ICD-10-CM

## 2023-12-27 DIAGNOSIS — L98.9 SKIN LESION: ICD-10-CM

## 2023-12-27 DIAGNOSIS — Z45.2 PICC (PERIPHERALLY INSERTED CENTRAL CATHETER) FLUSH: ICD-10-CM

## 2023-12-27 DIAGNOSIS — F33.1 MAJOR DEPRESSIVE DISORDER, RECURRENT EPISODE, MODERATE (H): ICD-10-CM

## 2023-12-27 DIAGNOSIS — M86.672 OTHER CHRONIC OSTEOMYELITIS OF LEFT FOOT (H): ICD-10-CM

## 2023-12-27 DIAGNOSIS — I50.812 CHRONIC RIGHT-SIDED HEART FAILURE (H): ICD-10-CM

## 2023-12-27 DIAGNOSIS — F11.20 OPIOID DEPENDENCE, UNCOMPLICATED (H): ICD-10-CM

## 2023-12-27 LAB
ANION GAP SERPL CALCULATED.3IONS-SCNC: 9 MMOL/L (ref 7–15)
BASOPHILS # BLD AUTO: 0 10E3/UL (ref 0–0.2)
BASOPHILS NFR BLD AUTO: 1 %
BUN SERPL-MCNC: 40.4 MG/DL (ref 8–23)
CALCIUM SERPL-MCNC: 10.6 MG/DL (ref 8.8–10.2)
CHLORIDE SERPL-SCNC: 96 MMOL/L (ref 98–107)
CREAT SERPL-MCNC: 0.95 MG/DL (ref 0.51–0.95)
DEPRECATED HCO3 PLAS-SCNC: 32 MMOL/L (ref 22–29)
EGFRCR SERPLBLD CKD-EPI 2021: 61 ML/MIN/1.73M2
EOSINOPHIL # BLD AUTO: 0.4 10E3/UL (ref 0–0.7)
EOSINOPHIL NFR BLD AUTO: 6 %
ERYTHROCYTE [DISTWIDTH] IN BLOOD BY AUTOMATED COUNT: 14.6 % (ref 10–15)
GLUCOSE SERPL-MCNC: 261 MG/DL (ref 70–99)
HCT VFR BLD AUTO: 33.5 % (ref 35–47)
HGB BLD-MCNC: 10.5 G/DL (ref 11.7–15.7)
IMM GRANULOCYTES # BLD: 0 10E3/UL
IMM GRANULOCYTES NFR BLD: 0 %
LYMPHOCYTES # BLD AUTO: 1.4 10E3/UL (ref 0.8–5.3)
LYMPHOCYTES NFR BLD AUTO: 18 %
MCH RBC QN AUTO: 28.1 PG (ref 26.5–33)
MCHC RBC AUTO-ENTMCNC: 31.3 G/DL (ref 31.5–36.5)
MCV RBC AUTO: 90 FL (ref 78–100)
MONOCYTES # BLD AUTO: 0.6 10E3/UL (ref 0–1.3)
MONOCYTES NFR BLD AUTO: 7 %
NEUTROPHILS # BLD AUTO: 5.4 10E3/UL (ref 1.6–8.3)
NEUTROPHILS NFR BLD AUTO: 68 %
NRBC # BLD AUTO: 0 10E3/UL
NRBC BLD AUTO-RTO: 0 /100
PLATELET # BLD AUTO: 199 10E3/UL (ref 150–450)
POTASSIUM SERPL-SCNC: 4.5 MMOL/L (ref 3.4–5.3)
RBC # BLD AUTO: 3.74 10E6/UL (ref 3.8–5.2)
SODIUM SERPL-SCNC: 137 MMOL/L (ref 135–145)
WBC # BLD AUTO: 8 10E3/UL (ref 4–11)

## 2023-12-27 PROCEDURE — 85025 COMPLETE CBC W/AUTO DIFF WBC: CPT | Performed by: NURSE PRACTITIONER

## 2023-12-27 PROCEDURE — 80048 BASIC METABOLIC PNL TOTAL CA: CPT | Performed by: NURSE PRACTITIONER

## 2023-12-27 PROCEDURE — 99309 SBSQ NF CARE MODERATE MDM 30: CPT | Performed by: NURSE PRACTITIONER

## 2023-12-27 PROCEDURE — 36415 COLL VENOUS BLD VENIPUNCTURE: CPT | Performed by: NURSE PRACTITIONER

## 2023-12-27 RX ORDER — METHADONE HYDROCHLORIDE 5 MG/1
5 TABLET ORAL 3 TIMES DAILY
Qty: 90 TABLET | Refills: 0 | Status: SHIPPED | OUTPATIENT
Start: 2023-12-27 | End: 2024-01-02

## 2023-12-27 RX ORDER — METHADONE HYDROCHLORIDE 10 MG/1
10 TABLET ORAL DAILY
Qty: 30 TABLET | Refills: 0 | Status: SHIPPED | OUTPATIENT
Start: 2023-12-27 | End: 2024-01-02

## 2023-12-27 NOTE — LETTER
12/27/2023        RE: Linda Loredo  [address]  [address]  [facility] [address]        [facility] GERIATRICS    Chief Complaint   Patient presents with     RECHECK     HPI:  Linda Loredo is a 78 year old  (1945), who is being seen today for an episodic care visit at: [facility] (TC)([facility]) [[id number]]. Today's concern is: The primary encounter diagnosis was Paroxysmal atrial fibrillation (H). Diagnoses of Warfarin-induced coagulopathy , Opioid dependence, uncomplicated (H), Diabetic ulcer of left heel associated with type 2 diabetes mellitus, with necrosis of bone (H), Other chronic osteomyelitis of left foot (H), 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), Essential hypertension, Dyslipidemia, Stage 3a chronic kidney disease (H), Chronic right-sided heart failure (H), Type 2 diabetes mellitus with diabetic polyneuropathy, with long-term current use of insulin (H), Severe obesity (H), Cellulitis of right lower extremity, Venous stasis, Lymphedema, Status post debridement, Redness, Hx of BKA, left (H), Essential hypertension with goal blood pressure less than 140/90, Skin lesion, Acute blood loss anemia, PAD (peripheral artery disease) (H24), PICC (peripherally inserted central catheter) flush, Primary hyperparathyroidism (H24), Chronic obstructive pulmonary disease, unspecified COPD type (H), Mild intermittent asthma without complication, Other chronic pain, and Major depressive disorder, recurrent episode, moderate (H) were also pertinent to this visit.    Met with patient who denies any chest pain, palpitations, shortness of breath, RASHID, lightheadedness, dizziness, or cough. Denies any abdominal discomfort. Denies N&V. Denies B&B concerns. Denies dysuria or frequency. Denies loose or constipation. Appetite good. Sleeping well. She reports getting ready for her surgery next week. Staff and HUC are all aware and are in  "process of obtaining appropriate transportation needs. Pre op completed 12/18. Last day of coumadin is planned for 12/28. INR due 12/29. She reports wound to stump area is getting smaller. Wounds to RLE are scabbing over and slowly healing as well. Pain stable with current methadone orders. Continues to have PICC in place. Flushes well, but not able to get blood return.     BP Readings from Last 3 Encounters:   12/26/23 130/78   12/22/23 119/75   12/18/23 119/69     Wt Readings from Last 5 Encounters:   12/26/23 106.7 kg (235 lb 3.2 oz)   12/22/23 106.7 kg (235 lb 3.2 oz)   12/18/23 106.8 kg (235 lb 6.4 oz)   12/15/23 106.5 kg (234 lb 12.8 oz)   12/11/23 107.2 kg (236 lb 6.4 oz)     Allergies, and PMH/PSH reviewed in EPIC today.  REVIEW OF SYSTEMS:  4 point ROS including Respiratory, CV, GI and , other than that noted in the HPI,  is negative    Objective:   /78   Pulse 72   Temp 97.9  F (36.6  C)   Resp 17   Ht 1.676 m (5' 6\")   Wt 106.7 kg (235 lb 3.2 oz)   SpO2 98%   BMI 37.96 kg/m    GENERAL APPEARANCE:  Alert, in no distress, oriented, morbidly obese, cooperative  ENT:  Mouth and posterior oropharynx normal, moist mucous membranes, normal hearing acuity  EYES:  EOM, conjunctivae, lids, pupils and irises normal  NECK:  No adenopathy,masses or thyromegaly  RESP:  respiratory effort and palpation of chest normal, lungs clear to auscultation , no respiratory distress  CV:  Palpation and auscultation of heart done , regular rate and rhythm, no murmur, rub, or gallop  ABDOMEN:  normal bowel sounds, soft, nontender, no hepatosplenomegaly or other masses, no guarding or rebound  M/S:   Slide board transfers. Wheelchair bound. Independent with propelling.   SKIN:  wound healing well, no signs of infection slowly resolving. Stump open, pending flap repair surgery   NEURO:   Cranial nerves 2-12 are normal tested and grossly at patient's baseline, no purposeful movement in upper and lower extremities  PSYCH: "  oriented X 3, normal insight, judgement and memory, affect and mood normal    Most Recent 3 CBC's:  Recent Labs   Lab Test 12/27/23  0958 12/08/23  0600 11/24/23  1012   WBC 8.0 7.5 7.0   HGB 10.5* 9.6* 9.9*   MCV 90 91 94    198 228     Most Recent 3 BMP's:  Recent Labs   Lab Test 12/27/23  0958 12/08/23  1226 11/24/23  1012    137 140   POTASSIUM 4.5 4.6 4.5   CHLORIDE 96* 98 101   CO2 32* 30* 28   BUN 40.4* 32.4* 35.2*   CR 0.95 0.96* 0.98*   ANIONGAP 9 9 11   ZAKIA 10.6* 10.1 10.4*   * 185* 228*     Most Recent 2 LFT's:  Recent Labs   Lab Test 11/17/23  1033 11/01/23  0613   AST 20 19   ALT 11 12   ALKPHOS 90 97   BILITOTAL 0.5 0.2     Most Recent 3 INR's:  Recent Labs   Lab Test 12/08/23  1226 11/24/23  1012 11/17/23  1033   INR 2.73* 3.50* 1.69*     Most Recent TSH and T4:  Recent Labs   Lab Test 11/24/23  1012   TSH 3.46   T4 1.20     Most Recent Hemoglobin A1c:  Recent Labs   Lab Test 12/08/23  1226   A1C 7.6*     Most Recent Anemia Panel:  Recent Labs   Lab Test 12/27/23  0958   WBC 8.0   HGB 10.5*   HCT 33.5*   MCV 90          ASSESSMENT/PLAN:  (E11.621,  L97.509,  Z79.4) Type 2 diabetes mellitus with foot ulcer, with long-term current use of insulin (H)  (E66.01) Obesity, Class III, BMI 40-49.9 (morbid obesity) (H)  Comment: Chronic. Last A1c 7% in sept 2023. Results 12/8/23 were 7.6%. Goal <9%.   Plan:   -Monitor Blood Glucose QID as directed  -Continue sliding scale insulin as directed TID with meals and HS  -Continue tresiba 14 units at HS--Give 10Units at HS night prior to surgery as directed  -BMP and CBC due today 12/27/23--results pending  -Trend labs upon surgery return     (I48.0) Paroxysmal atrial fibrillation (H)  (Z79.01) Long term (current) use of anticoagulants  (D68.32,  T45.515A) Warfarin-induced coagulopathy   (I10) Essential hypertension with goal blood pressure less than 140/90  (E78.2) Mixed hyperlipidemia  (N18.30) Stage 3 chronic kidney disease, unspecified  whether stage 3a or 3b CKD (H)  (I50.812) Chronic right-sided heart failure (H)  Comment: Chronic. Baseline Creatinine~ 1-1.2. Based on JNC-8 goals,  patients age of 78 year old, presence of diabetes or CKD, and goals of care goal BP is  <140/90 mm Hg. Patient is stable with current plan of care and routine assessment..Holding lisinopril per chart review per patient wishes. INR goal 2-3.INR via fingerstick on 12/22 was 2.30  Plan:   -Monitor for worsening s/symptoms of concerns  -Monitor BP and HR as directed  -Continue asa 81mg daily  -Continue atorvastatin 40mg daily  -Continue coumadin 1.5mg on Mon/Thurs and 2mg AOD until 12/28/23 INR due 12/29/23. HOLD coumadin starting 12/29 until further notice given surgery next week  -Monitor weights as directed. Baseline weight around 222-224# on admission but now weight noted to be 235#.   -Continue torsemide 20mg daily. HOLD if SBP<100  -BMP and CBC due today 12/27/23--results pending  -Trend labs upon surgery return     (R60.0) Edema of right extremity  (L53.9) Redness  (L03.115) Cellulitis of right lower extremity  Comment: Acute on chronic. Noted increased redness to RLE with warmth and pain soon after admission. Currently on coumadin. US negative for DVT risks. 4 areas areas are present to right leg. 3 anterior and 1 posterior (see photo above for reference). History of completing keflex with good improvement, but now redness persists to anterior portion of calf. Patient reports redness comes and goes daily. Redness was marked with black marker, Offered to start antibiotic course now, however patient wants to wait and monitor for now. I advised her that if redness persists or worsens, then to notify staff and we will start antibiotic treatment right away. In agreement to this plan below.  Unable to start prednisone given allergy risk. Suspect edema may also be contributing to wound concerns to RLE. SLOWLY IMPROVING with 10 day course of clindamycin  Plan:  -Lymphedema  therapy to assess and assist with edema control---currently on hold for now per request  -Continue wound care to RLE for now:   *Cleanse with normal saline. Apply bactroban to open areas BID. Leave NANCY. Offered more intervention with dressings, however she declines at this time.   -Monitor for worsening s/symptoms of concerns  -Continue benadryl every 6 hours PRN  -Continue hydrocortisone cream to apply to back, arms and right leg redness BID and BID PRN  -BMP and CBC due today 12/27/23--results pending  -Trend labs upon surgery return     (E11.621,  L97.425) Diabetic ulcer of left heel associated with type 2 diabetes mellitus, with muscle involvement without evidence of necrosis (H)  (Z89.512) 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)  (Z98.890) Status post debridement  (M86.9) Osteomyelitis of left foot, unspecified type (H)  (F11.20) Opioid dependence, uncomplicated (H)  (G89.4) Chronic pain syndrome  (I83.019,  I83.029,  L97.919,  L97.929) Venous stasis ulcers of both lower extremities (H)  (I89.0) Lymphedema  Comment: Chronic. S/p debridement 9/26/23. S/p Left guillotine BKA on 10/7/23. S/p tibial/fibular resection and debridement of stump 10/17/23. Followed by vascular. Completed course of meropenem/vancomycin 10/8 for osteomyelitis. Surgery scheduled for 1/2/24 per review.   Plan:   -Monitor pain complaints  -In house wound provider to follow for ongoing needs   -Continue methadone 10mg daily at 1500 and 5mg TID scheduled.   -Tylenol PRN  -Continue triple lumen PICC to RUE as directed. Added daily flush and PRN along with dressing weekly changes.   -BMP and CBC due today 12/27/23--results pending  -Trend labs upon surgery return  -Follow up with vascular as directed. Surgery scheduled at [facility] on 1/2/24 at 0930. Needs to arrive 2 hours prior. (PRE-OP COMPLETED 12/18/23)  -Apply hemp lotion per her request to right heel dry skin BID--keep at bedside and staff to apply accordingly  -Continue  current wound cares as directed;  *Wound Care - Left BKA : *Change daily* 1. Cleanse w/Vashe soaked gauze, including sreekanth wound skin, gentle pat dry. 2. Apply skin prep to sreekanth wound skin. 3. Soak Hydrofera Blue with saline and place over wound bed, cover with abd, and wrap with kerlix to secure     Electronically Signed by:  Dr. Haylie Rowe DNP, APRN, FNP-C, WCS-C, EDS-C              Sincerely,        Haylie Rowe, MOE CNP

## 2023-12-27 NOTE — TELEPHONE ENCOUNTER
Received instructions from Vanita Carter NP about post op appts.  Set patient up for 01/08/24 with Vanita.  This must be in person appointment.  Telephone visit on 01/15/24 was canceled.  LM asking patient to call me to discuss.

## 2023-12-27 NOTE — TELEPHONE ENCOUNTER
Wound vac order placed and faxed to [facility] at [phone].  Documents included insurance auth form, demographics, and recent office note.    Prescription sent to Dr. Otto via email to be signed.    Confirmed via RightFax at 0928.    To view the progress for your rental order with [facility] please visit the following link:    [email or web address]      This link will remain active until the order has been fulfilled, canceled, or until two weeks have elapsed since the product was requested.  If you have further questions about the status of your order, please call our National Contact Center at [phone]    JOHAN CoelhoN, RN, [facility]

## 2023-12-28 NOTE — PROGRESS NOTES
"[facility] GERIATRICS  [place] Medical Record Number:  [id number]  Place of Service where encounter took place: [facility])([facility]) [[id number]]    HPI:    Linda Loredo is a 78 year old  (1945), who is being seen today for an episodic care visit at the above location. Today's concern is INR/Coumadin management for NICO Rolle    ROS/Subjective:  Bleeding Signs/Symptoms:  None  Thromboembolic Signs/Symptoms:  None  Medication Changes:  No  Dietary Changes:  No  Activity Changes: No  Bacterial/Viral Infection:  No  Missed Coumadin Doses:  None  On ASA: Yes - 81 mg po q day  Other Concerns:  No    OBJECTIVE:  /79   Pulse 85   Temp 97.7  F (36.5  C)   Resp 17   Ht 1.676 m (5' 6\")   Wt 106.4 kg (234 lb 9.6 oz)   SpO2 100%   BMI 37.87 kg/m    Last INR: 2.30 on 12/22/23 via fingerstick  INR Today:  2.6  Current Dose:  Coumadin 1.5mg Mon/thurs and 2mg AOD until 12/28/23- surgery scheduled next week therefore will stop coumadin until post surgery.       ASSESSMENT:     Paroxysmal atrial fibrillation (H)  Warfarin-induced coagulopathy   Encounter for therapeutic drug monitoring  Long term (current) use of anticoagulants  Therapeutic INR for goal of 2-3    PLAN/ORDERS:   New Dose: STOP coumadin until further notice until post surgery 1/2/24    Next INR: undetermined given surgery with potential hospital admission.     Electronically signed by:  Dr. Haylie Rowe DNP, APRN, FNP-C, WCS-C, EDS-C      "

## 2023-12-29 ENCOUNTER — TRANSITIONAL CARE UNIT VISIT (OUTPATIENT)
Dept: GERIATRICS | Facility: CLINIC | Age: 78
End: 2023-12-29
Payer: COMMERCIAL

## 2023-12-29 VITALS
RESPIRATION RATE: 17 BRPM | OXYGEN SATURATION: 100 % | DIASTOLIC BLOOD PRESSURE: 79 MMHG | SYSTOLIC BLOOD PRESSURE: 129 MMHG | TEMPERATURE: 97.7 F | HEIGHT: 66 IN | WEIGHT: 234.6 LBS | BODY MASS INDEX: 37.7 KG/M2 | HEART RATE: 85 BPM

## 2023-12-29 DIAGNOSIS — I48.0 PAROXYSMAL ATRIAL FIBRILLATION (H): Primary | ICD-10-CM

## 2023-12-29 DIAGNOSIS — T45.515A WARFARIN-INDUCED COAGULOPATHY (H): ICD-10-CM

## 2023-12-29 DIAGNOSIS — D68.32 WARFARIN-INDUCED COAGULOPATHY (H): ICD-10-CM

## 2023-12-29 DIAGNOSIS — Z51.81 ENCOUNTER FOR THERAPEUTIC DRUG MONITORING: ICD-10-CM

## 2023-12-29 DIAGNOSIS — Z79.01 LONG TERM (CURRENT) USE OF ANTICOAGULANTS: ICD-10-CM

## 2023-12-29 PROCEDURE — 99308 SBSQ NF CARE LOW MDM 20: CPT | Performed by: NURSE PRACTITIONER

## 2023-12-29 NOTE — LETTER
"    12/29/2023        RE: Linda Lordeo  [address]  [address]  [facility] [address]        [facility] Medical Record Number:  [id number]  Place of Service where encounter took place: [facility])([facility]) [[id number]]    HPI:    Linda Loredo is a 78 year old  (1945), who is being seen today for an episodic care visit at the above location. Today's concern is INR/Coumadin management for NICO Fib    ROS/Subjective:  Bleeding Signs/Symptoms:  None  Thromboembolic Signs/Symptoms:  None  Medication Changes:  No  Dietary Changes:  No  Activity Changes: No  Bacterial/Viral Infection:  No  Missed Coumadin Doses:  None  On ASA: Yes - 81 mg po q day  Other Concerns:  No    OBJECTIVE:  /79   Pulse 85   Temp 97.7  F (36.5  C)   Resp 17   Ht 1.676 m (5' 6\")   Wt 106.4 kg (234 lb 9.6 oz)   SpO2 100%   BMI 37.87 kg/m    Last INR: 2.30 on 12/22/23 via fingerstick  INR Today:  2.6  Current Dose:  Coumadin 1.5mg Mon/thurs and 2mg AOD until 12/28/23- surgery scheduled next week therefore will stop coumadin until post surgery.       ASSESSMENT:     Paroxysmal atrial fibrillation (H)  Warfarin-induced coagulopathy   Encounter for therapeutic drug monitoring  Long term (current) use of anticoagulants  Therapeutic INR for goal of 2-3    PLAN/ORDERS:   New Dose: STOP coumadin until further notice until post surgery 1/2/24    Next INR: undetermined given surgery with potential hospital admission.     Electronically signed by:  Dr. Haylie Rowe DNP, APRN, FNP-C, WCS-C, EDS-C          Sincerely,        Haylie Rowe, MOE CNP      "

## 2023-12-29 NOTE — TELEPHONE ENCOUNTER
LM again asking patient to call to verify post op appointments. I talked with NP who said the daughter has been involved.  Called Lu and she was grateful for the information and will relay change in post op to her mom and her care center.

## 2024-01-02 ENCOUNTER — APPOINTMENT (OUTPATIENT)
Dept: SURGERY | Facility: PHYSICIAN GROUP | Age: 79
End: 2024-01-02
Payer: COMMERCIAL

## 2024-01-02 ENCOUNTER — DOCUMENTATION ONLY (OUTPATIENT)
Dept: GERIATRICS | Facility: CLINIC | Age: 79
End: 2024-01-02

## 2024-01-02 ENCOUNTER — ANESTHESIA EVENT (OUTPATIENT)
Dept: SURGERY | Facility: CLINIC | Age: 79
End: 2024-01-02
Payer: COMMERCIAL

## 2024-01-02 ENCOUNTER — HOSPITAL ENCOUNTER (OUTPATIENT)
Facility: CLINIC | Age: 79
Discharge: HOME | End: 2024-01-02
Attending: SURGERY | Admitting: SURGERY
Payer: COMMERCIAL

## 2024-01-02 ENCOUNTER — ANESTHESIA (OUTPATIENT)
Dept: SURGERY | Facility: CLINIC | Age: 79
End: 2024-01-02
Payer: COMMERCIAL

## 2024-01-02 VITALS
OXYGEN SATURATION: 97 % | RESPIRATION RATE: 12 BRPM | DIASTOLIC BLOOD PRESSURE: 94 MMHG | HEART RATE: 72 BPM | BODY MASS INDEX: 37.28 KG/M2 | TEMPERATURE: 97.1 F | WEIGHT: 232 LBS | SYSTOLIC BLOOD PRESSURE: 169 MMHG | HEIGHT: 66 IN

## 2024-01-02 DIAGNOSIS — T87.89 NON-HEALING WOUND OF AMPUTATION STUMP (H): Primary | ICD-10-CM

## 2024-01-02 LAB
ANION GAP SERPL CALCULATED.3IONS-SCNC: 8 MMOL/L (ref 7–15)
BUN SERPL-MCNC: 37.1 MG/DL (ref 8–23)
CALCIUM SERPL-MCNC: 11 MG/DL (ref 8.8–10.2)
CHLORIDE SERPL-SCNC: 94 MMOL/L (ref 98–107)
CREAT SERPL-MCNC: 0.87 MG/DL (ref 0.51–0.95)
DEPRECATED HCO3 PLAS-SCNC: 34 MMOL/L (ref 22–29)
EGFRCR SERPLBLD CKD-EPI 2021: 68 ML/MIN/1.73M2
GLUCOSE BLDC GLUCOMTR-MCNC: 208 MG/DL (ref 70–99)
GLUCOSE BLDC GLUCOMTR-MCNC: 226 MG/DL (ref 70–99)
GLUCOSE SERPL-MCNC: 232 MG/DL (ref 70–99)
HBA1C MFR BLD: 8.7 %
INR PPP: 1.47 (ref 0.85–1.15)
POTASSIUM SERPL-SCNC: 4.1 MMOL/L (ref 3.4–5.3)
SODIUM SERPL-SCNC: 136 MMOL/L (ref 135–145)

## 2024-01-02 PROCEDURE — 82962 GLUCOSE BLOOD TEST: CPT

## 2024-01-02 PROCEDURE — 360N000076 HC SURGERY LEVEL 3, PER MIN: Performed by: SURGERY

## 2024-01-02 PROCEDURE — 36415 COLL VENOUS BLD VENIPUNCTURE: CPT | Performed by: ANESTHESIOLOGY

## 2024-01-02 PROCEDURE — 710N000012 HC RECOVERY PHASE 2, PER MINUTE: Performed by: SURGERY

## 2024-01-02 PROCEDURE — 15100 SPLT AGRFT T/A/L 1ST 100SQCM: CPT | Mod: 58 | Performed by: SURGERY

## 2024-01-02 PROCEDURE — 999N000141 HC STATISTIC PRE-PROCEDURE NURSING ASSESSMENT: Performed by: SURGERY

## 2024-01-02 PROCEDURE — 370N000017 HC ANESTHESIA TECHNICAL FEE, PER MIN: Performed by: SURGERY

## 2024-01-02 PROCEDURE — 258N000003 HC RX IP 258 OP 636: Performed by: SURGERY

## 2024-01-02 PROCEDURE — 710N000010 HC RECOVERY PHASE 1, LEVEL 2, PER MIN: Performed by: SURGERY

## 2024-01-02 PROCEDURE — 36415 COLL VENOUS BLD VENIPUNCTURE: CPT | Performed by: SURGERY

## 2024-01-02 PROCEDURE — 93005 ELECTROCARDIOGRAM TRACING: CPT

## 2024-01-02 PROCEDURE — 250N000012 HC RX MED GY IP 250 OP 636 PS 637: Performed by: ANESTHESIOLOGY

## 2024-01-02 PROCEDURE — 250N000009 HC RX 250: Performed by: SURGERY

## 2024-01-02 PROCEDURE — 250N000025 HC SEVOFLURANE, PER MIN: Performed by: SURGERY

## 2024-01-02 PROCEDURE — 999N000054 HC STATISTIC EKG NON-CHARGEABLE

## 2024-01-02 PROCEDURE — 258N000003 HC RX IP 258 OP 636: Performed by: NURSE ANESTHETIST, CERTIFIED REGISTERED

## 2024-01-02 PROCEDURE — 80048 BASIC METABOLIC PNL TOTAL CA: CPT | Performed by: SURGERY

## 2024-01-02 PROCEDURE — 83036 HEMOGLOBIN GLYCOSYLATED A1C: CPT | Performed by: SURGERY

## 2024-01-02 PROCEDURE — 85610 PROTHROMBIN TIME: CPT | Performed by: ANESTHESIOLOGY

## 2024-01-02 PROCEDURE — 250N000011 HC RX IP 250 OP 636: Performed by: NURSE ANESTHETIST, CERTIFIED REGISTERED

## 2024-01-02 PROCEDURE — 250N000009 HC RX 250: Performed by: NURSE ANESTHETIST, CERTIFIED REGISTERED

## 2024-01-02 PROCEDURE — 250N000011 HC RX IP 250 OP 636: Performed by: SURGERY

## 2024-01-02 PROCEDURE — 272N000001 HC OR GENERAL SUPPLY STERILE: Performed by: SURGERY

## 2024-01-02 RX ORDER — HYDROMORPHONE HCL IN WATER/PF 6 MG/30 ML
0.2 PATIENT CONTROLLED ANALGESIA SYRINGE INTRAVENOUS EVERY 5 MIN PRN
Status: DISCONTINUED | OUTPATIENT
Start: 2024-01-02 | End: 2024-01-02 | Stop reason: HOSPADM

## 2024-01-02 RX ORDER — LIDOCAINE 40 MG/G
CREAM TOPICAL
Status: DISCONTINUED | OUTPATIENT
Start: 2024-01-02 | End: 2024-01-02 | Stop reason: HOSPADM

## 2024-01-02 RX ORDER — ONDANSETRON 4 MG/1
4 TABLET, ORALLY DISINTEGRATING ORAL EVERY 30 MIN PRN
Status: DISCONTINUED | OUTPATIENT
Start: 2024-01-02 | End: 2024-01-02 | Stop reason: HOSPADM

## 2024-01-02 RX ORDER — OXYCODONE HYDROCHLORIDE 5 MG/1
5 TABLET ORAL
Status: DISCONTINUED | OUTPATIENT
Start: 2024-01-02 | End: 2024-01-02 | Stop reason: HOSPADM

## 2024-01-02 RX ORDER — METHADONE HYDROCHLORIDE 10 MG/1
10 TABLET ORAL DAILY
Qty: 30 TABLET | Refills: 0 | Status: SHIPPED | OUTPATIENT
Start: 2024-01-02 | End: 2024-01-15

## 2024-01-02 RX ORDER — FENTANYL CITRATE 0.05 MG/ML
25 INJECTION, SOLUTION INTRAMUSCULAR; INTRAVENOUS EVERY 5 MIN PRN
Status: DISCONTINUED | OUTPATIENT
Start: 2024-01-02 | End: 2024-01-02 | Stop reason: HOSPADM

## 2024-01-02 RX ORDER — NALOXONE HYDROCHLORIDE 0.4 MG/ML
INJECTION, SOLUTION INTRAMUSCULAR; INTRAVENOUS; SUBCUTANEOUS PRN
Status: DISCONTINUED | OUTPATIENT
Start: 2024-01-02 | End: 2024-01-02

## 2024-01-02 RX ORDER — OXYCODONE HYDROCHLORIDE 5 MG/1
5-10 TABLET ORAL EVERY 4 HOURS PRN
Qty: 30 TABLET | Refills: 0 | Status: SHIPPED | OUTPATIENT
Start: 2024-01-02 | End: 2024-01-08

## 2024-01-02 RX ORDER — ACETAMINOPHEN 325 MG/1
650 TABLET ORAL
Status: DISCONTINUED | OUTPATIENT
Start: 2024-01-02 | End: 2024-01-02 | Stop reason: HOSPADM

## 2024-01-02 RX ORDER — FENTANYL CITRATE 0.05 MG/ML
50 INJECTION, SOLUTION INTRAMUSCULAR; INTRAVENOUS EVERY 5 MIN PRN
Status: DISCONTINUED | OUTPATIENT
Start: 2024-01-02 | End: 2024-01-02 | Stop reason: HOSPADM

## 2024-01-02 RX ORDER — SODIUM CHLORIDE, SODIUM LACTATE, POTASSIUM CHLORIDE, CALCIUM CHLORIDE 600; 310; 30; 20 MG/100ML; MG/100ML; MG/100ML; MG/100ML
INJECTION, SOLUTION INTRAVENOUS CONTINUOUS
Status: DISCONTINUED | OUTPATIENT
Start: 2024-01-02 | End: 2024-01-02 | Stop reason: HOSPADM

## 2024-01-02 RX ORDER — OXYCODONE HYDROCHLORIDE 5 MG/1
5-10 TABLET ORAL EVERY 4 HOURS PRN
Qty: 6 TABLET | Refills: 0 | Status: SHIPPED | OUTPATIENT
Start: 2024-01-02 | End: 2024-01-02

## 2024-01-02 RX ORDER — FENTANYL CITRATE 50 UG/ML
INJECTION, SOLUTION INTRAMUSCULAR; INTRAVENOUS PRN
Status: DISCONTINUED | OUTPATIENT
Start: 2024-01-02 | End: 2024-01-02

## 2024-01-02 RX ORDER — ONDANSETRON 2 MG/ML
INJECTION INTRAMUSCULAR; INTRAVENOUS PRN
Status: DISCONTINUED | OUTPATIENT
Start: 2024-01-02 | End: 2024-01-02

## 2024-01-02 RX ORDER — HYDRALAZINE HYDROCHLORIDE 20 MG/ML
2.5-5 INJECTION INTRAMUSCULAR; INTRAVENOUS
Status: DISCONTINUED | OUTPATIENT
Start: 2024-01-02 | End: 2024-01-02 | Stop reason: HOSPADM

## 2024-01-02 RX ORDER — DEXMEDETOMIDINE HYDROCHLORIDE 4 UG/ML
INJECTION, SOLUTION INTRAVENOUS PRN
Status: DISCONTINUED | OUTPATIENT
Start: 2024-01-02 | End: 2024-01-02

## 2024-01-02 RX ORDER — LABETALOL HYDROCHLORIDE 5 MG/ML
5 INJECTION, SOLUTION INTRAVENOUS
Status: DISCONTINUED | OUTPATIENT
Start: 2024-01-02 | End: 2024-01-02 | Stop reason: HOSPADM

## 2024-01-02 RX ORDER — PROPOFOL 10 MG/ML
INJECTION, EMULSION INTRAVENOUS CONTINUOUS PRN
Status: DISCONTINUED | OUTPATIENT
Start: 2024-01-02 | End: 2024-01-02

## 2024-01-02 RX ORDER — MAGNESIUM HYDROXIDE 1200 MG/15ML
LIQUID ORAL PRN
Status: DISCONTINUED | OUTPATIENT
Start: 2024-01-02 | End: 2024-01-02 | Stop reason: HOSPADM

## 2024-01-02 RX ORDER — METHADONE HYDROCHLORIDE 5 MG/1
5 TABLET ORAL 3 TIMES DAILY
Qty: 90 TABLET | Refills: 0 | Status: SHIPPED | OUTPATIENT
Start: 2024-01-02 | End: 2024-01-15

## 2024-01-02 RX ORDER — SODIUM CHLORIDE, SODIUM LACTATE, POTASSIUM CHLORIDE, CALCIUM CHLORIDE 600; 310; 30; 20 MG/100ML; MG/100ML; MG/100ML; MG/100ML
INJECTION, SOLUTION INTRAVENOUS CONTINUOUS PRN
Status: DISCONTINUED | OUTPATIENT
Start: 2024-01-02 | End: 2024-01-02

## 2024-01-02 RX ORDER — ONDANSETRON 2 MG/ML
4 INJECTION INTRAMUSCULAR; INTRAVENOUS EVERY 30 MIN PRN
Status: DISCONTINUED | OUTPATIENT
Start: 2024-01-02 | End: 2024-01-02 | Stop reason: HOSPADM

## 2024-01-02 RX ORDER — LIDOCAINE HYDROCHLORIDE 20 MG/ML
INJECTION, SOLUTION INFILTRATION; PERINEURAL PRN
Status: DISCONTINUED | OUTPATIENT
Start: 2024-01-02 | End: 2024-01-02

## 2024-01-02 RX ORDER — PROPOFOL 10 MG/ML
INJECTION, EMULSION INTRAVENOUS PRN
Status: DISCONTINUED | OUTPATIENT
Start: 2024-01-02 | End: 2024-01-02

## 2024-01-02 RX ORDER — CEFAZOLIN SODIUM/WATER 2 G/20 ML
2 SYRINGE (ML) INTRAVENOUS SEE ADMIN INSTRUCTIONS
Status: DISCONTINUED | OUTPATIENT
Start: 2024-01-02 | End: 2024-01-02 | Stop reason: HOSPADM

## 2024-01-02 RX ORDER — MINERAL OIL
OIL (ML) MISCELLANEOUS PRN
Status: DISCONTINUED | OUTPATIENT
Start: 2024-01-02 | End: 2024-01-02 | Stop reason: HOSPADM

## 2024-01-02 RX ORDER — CEFAZOLIN SODIUM/WATER 2 G/20 ML
2 SYRINGE (ML) INTRAVENOUS
Status: COMPLETED | OUTPATIENT
Start: 2024-01-02 | End: 2024-01-02

## 2024-01-02 RX ADMIN — ONDANSETRON 4 MG: 2 INJECTION INTRAMUSCULAR; INTRAVENOUS at 11:05

## 2024-01-02 RX ADMIN — PHENYLEPHRINE HYDROCHLORIDE 150 MCG: 10 INJECTION INTRAVENOUS at 11:12

## 2024-01-02 RX ADMIN — INSULIN ASPART 2 UNITS: 100 INJECTION, SOLUTION INTRAVENOUS; SUBCUTANEOUS at 09:30

## 2024-01-02 RX ADMIN — FENTANYL CITRATE 50 MCG: 50 INJECTION INTRAMUSCULAR; INTRAVENOUS at 10:22

## 2024-01-02 RX ADMIN — PROPOFOL 100 MCG/KG/MIN: 10 INJECTION, EMULSION INTRAVENOUS at 10:35

## 2024-01-02 RX ADMIN — Medication 2 G: at 10:23

## 2024-01-02 RX ADMIN — NALOXONE HYDROCHLORIDE 0.04 MG: 0.4 INJECTION, SOLUTION INTRAMUSCULAR; INTRAVENOUS; SUBCUTANEOUS at 11:19

## 2024-01-02 RX ADMIN — LIDOCAINE HYDROCHLORIDE 40 MG: 20 INJECTION, SOLUTION INFILTRATION; PERINEURAL at 10:32

## 2024-01-02 RX ADMIN — PHENYLEPHRINE HYDROCHLORIDE 150 MCG: 10 INJECTION INTRAVENOUS at 11:19

## 2024-01-02 RX ADMIN — DEXMEDETOMIDINE HYDROCHLORIDE 12 MCG: 200 INJECTION INTRAVENOUS at 10:57

## 2024-01-02 RX ADMIN — MIDAZOLAM 1 MG: 1 INJECTION INTRAMUSCULAR; INTRAVENOUS at 10:20

## 2024-01-02 RX ADMIN — SODIUM CHLORIDE, POTASSIUM CHLORIDE, SODIUM LACTATE AND CALCIUM CHLORIDE: 600; 310; 30; 20 INJECTION, SOLUTION INTRAVENOUS at 08:49

## 2024-01-02 RX ADMIN — PHENYLEPHRINE HYDROCHLORIDE 0.5 MCG/KG/MIN: 10 INJECTION INTRAVENOUS at 10:35

## 2024-01-02 RX ADMIN — FENTANYL CITRATE 50 MCG: 50 INJECTION INTRAMUSCULAR; INTRAVENOUS at 10:52

## 2024-01-02 RX ADMIN — PHENYLEPHRINE HYDROCHLORIDE 100 MCG: 10 INJECTION INTRAVENOUS at 10:32

## 2024-01-02 RX ADMIN — DEXMEDETOMIDINE HYDROCHLORIDE 8 MCG: 200 INJECTION INTRAVENOUS at 11:01

## 2024-01-02 RX ADMIN — PROPOFOL 120 MG: 10 INJECTION, EMULSION INTRAVENOUS at 10:32

## 2024-01-02 ASSESSMENT — ACTIVITIES OF DAILY LIVING (ADL)
ADLS_ACUITY_SCORE: 32

## 2024-01-02 ASSESSMENT — COPD QUESTIONNAIRES: COPD: 1

## 2024-01-02 ASSESSMENT — ENCOUNTER SYMPTOMS: DYSRHYTHMIAS: 1

## 2024-01-02 NOTE — ANESTHESIA CARE TRANSFER NOTE
Patient: Linda Loredo    Procedure: Procedure(s):  APPLICATION OF SPLIT-THICKNESS SKIN GRAFT TO LEFT BELOW KNEE STUMP, GRAFT FROM LEFT THIGH  WOUND VAC APPLICATION TO LEFT BELOW KNEE STUMP       Diagnosis: Open wound [T14.8XXA]  Diagnosis Additional Information: No value filed.    Anesthesia Type:   General     Note:    Oropharynx: oropharynx clear of all foreign objects and spontaneously breathing  Level of Consciousness: awake  Oxygen Supplementation: face mask  Level of Supplemental Oxygen (L/min / FiO2): 6  Independent Airway: airway patency satisfactory and stable  Dentition: dentition unchanged  Vital Signs Stable: post-procedure vital signs reviewed and stable  Report to RN Given: handoff report given  Patient transferred to: PACU  Comments: At end of procedure, spontaneous respirations, adequate tidal volumes, followed commands to voice, LMA removed atraumatically, oropharynx suctioned, airway patent after LMA removal. Oxygen via facemask at 6 liters per minute to PACU. Oxygen tubing connected to wall O2 in PACU, SpO2, NiBP, and EKG monitors and alarms on and functioning, Armand Hugger warmer connected to patient gown, report on patient's clinical status given to PACU RN, RN questions answered.      Handoff Report: Identifed the Patient, Identified the Reponsible Provider, Reviewed the pertinent medical history, Discussed the surgical course, Reviewed Intra-OP anesthesia mangement and issues during anesthesia, Set expectations for post-procedure period and Allowed opportunity for questions and acknowledgement of understanding      Vitals:  Vitals Value Taken Time   /57 01/02/24 1127   Temp     Pulse 77 01/02/24 1130   Resp 12 01/02/24 1130   SpO2 100 % 01/02/24 1130   Vitals shown include unfiled device data.    Electronically Signed By: Christina Mendez  January 2, 2024  11:31 AM

## 2024-01-02 NOTE — DISCHARGE INSTRUCTIONS
Please keep the dressings and wound vac on the left leg until the clinic visit on next Monday.  Please take tylenol/ibuprofen for pain. You are also prescribed with Oxycodone to help with pain in short-term. Please take it as prescribed.  OK to take shower tomorrow, no bath tub/swimming/submerging in water for next 6 weeks. Please be careful not to wet the left leg.  Please be cautious when moving not to disturb wound vac on the left leg.  Please let us know or come to the nearest ED if there is any concerns or alarming symptoms.      Same Day Surgery Discharge Instructions for  Sedation and General Anesthesia     It's not unusual to feel dizzy, light-headed or faint for up to 24 hours after surgery or while taking pain medication.  If you have these symptoms: sit for a few minutes before standing and have someone assist you when you get up to walk or use the bathroom.    You should rest and relax for the next 24 hours. We recommend you make arrangements to have an adult stay with you for at least 24 hours after your discharge.  Avoid hazardous and strenuous activity.    DO NOT DRIVE any vehicle or operate mechanical equipment for 24 hours following the end of your surgery.  Even though you may feel normal, your reactions may be affected by the medication you have received.    Do not drink alcoholic beverages for 24 hours following surgery.     Slowly progress to your regular diet as you feel able. It's not unusual to feel nauseated and/or vomit after receiving anesthesia.  If you develop these symptoms, drink clear liquids (apple juice, ginger ale, broth, 7-up, etc. ) until you feel better.  If your nausea and vomiting persists for 24 hours, please notify your surgeon.      All narcotic pain medications, along with inactivity and anesthesia, can cause constipation. Drinking plenty of liquids and increasing fiber intake will help.    For any questions of a medical nature, call your surgeon.    Do not make important  decisions for 24 hours.    If you had general anesthesia, you may have a sore throat for a couple of days related to the breathing tube used during surgery.  You may use Cepacol lozenges to help with this discomfort.  If it worsens or if you develop a fever, contact your surgeon.     If you feel your pain is not well managed with the pain medications prescribed by your surgeon, please contact your surgeon's office to let them know so they can address your concerns.      **If you have questions or concerns about your procedure  call Dr. Otto at [phone]**

## 2024-01-02 NOTE — ANESTHESIA PROCEDURE NOTES
Airway       Patient location during procedure: OR  Staff -        Anesthesiologist:  Dominik Monge MD       CRNA: Eden Wang APRN CRNA       Other Anesthesia Staff: Milo Burgos       Performed By: SRNA and with CRNAs       Procedure performed by resident/fellow/CRNA in presence of a teaching physician.    Consent for Airway        Urgency: elective  Indications and Patient Condition       Indications for airway management: sreekanth-procedural       Induction type:intravenous       Mask difficulty assessment: 0 - not attempted    Final Airway Details       Final airway type: supraglottic airway    Supraglottic Airway Details        Type: LMA       Brand: I-Gel       LMA size: 5    Post intubation assessment        Placement verified by: capnometry, equal breath sounds and chest rise        Number of attempts at approach: 1       Number of other approaches attempted: 0       Secured with: tape       Ease of procedure: easy       Dentition: Intact

## 2024-01-02 NOTE — ANESTHESIA PREPROCEDURE EVALUATION
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) (H)     Type II or unspecified type diabetes mellitus without mention of complication, not stated as uncontrolled     Unspecified essential hypertension       Past Surgical History:   Procedure Laterality Date    AMPUTATE LEG BELOW KNEE Left 10/7/2023    Procedure: LEFT GUILLOTINE BELOW KNEE AMPUTATION.;  Surgeon: Lan Otto MD;  Location: [facility] OR    AMPUTATE LEG BELOW KNEE Left 10/14/2023    Procedure: debridement of left below the knee amputation;  Surgeon: Lan Otto MD;  Location:  OR    CHOLECYSTECTOMY      INCISION AND DRAINAGE FOOT, COMBINED Left 2023    Procedure: Surgical debridement of all nonviable skin and soft tissue and bone left foot;  Surgeon: Nolberto Thorne DPM;  Location: [facility] OR    IR LOWER EXTREMITY ANGIOGRAM LEFT  10/3/2023    ligation of fallopian tube      OPEN REDUCTION INTERNAL FIXATION ANKLE  10/13/11    left    pinning of left 2nd toe        Allergies   Allergen Reactions    Prednisone Nausea and Vomiting    Morphine Nausea and Vomiting    Morphine [Fumaric Acid] Nausea and Vomiting      Social History     Tobacco Use    Smoking status: Never    Smokeless tobacco: Never   Substance Use Topics    Alcohol use: No      Wt Readings from Last 1 Encounters:   24 105.2 kg (232 lb)        Anesthesia Evaluation            ROS/MED HX  ENT/Pulmonary:     (+)  sleep apnea,          allergic rhinitis,               COPD,              Neurologic:       Cardiovascular:     (+)  hypertension- -   -  - -                        dysrhythmias, a-fib,        Previous cardiac testing   Echo: Date: Results:    Stress Test:  Date: Results:    ECG Reviewed:  Date: 1/24 Results:  Atrial fibrillation at 97 bpm  Cath:  Date: Results:      METS/Exercise Tolerance:     Hematologic:       Musculoskeletal: Comment: S/p BKA  Decubitus ulcers      GI/Hepatic:       Renal/Genitourinary:     (+) renal disease, type: CRI,            Endo: Comment: Hyperparathyroidism    (+)  type II DM,       Diabetic complications: neuropathy.      Obesity,       Psychiatric/Substance Use:     (+) psychiatric history depression  H/O chronic opiod use .     Infectious Disease:       Malignancy:       Other: Comment: Fibromyalgia     (+)  , H/O Chronic Pain,            OUTSIDE LABS:  CBC:   Lab Results   Component Value Date    WBC 8.0 12/27/2023    WBC 7.5 12/08/2023    HGB 10.5 (L) 12/27/2023    HGB 9.6 (L) 12/08/2023    HCT 33.5 (L) 12/27/2023    HCT 31.1 (L) 12/08/2023     12/27/2023     12/08/2023     BMP:   Lab Results   Component Value Date     12/27/2023     12/08/2023    POTASSIUM 4.5 12/27/2023    POTASSIUM 4.6 12/08/2023    CHLORIDE 96 (L) 12/27/2023    CHLORIDE 98 12/08/2023    CO2 32 (H) 12/27/2023    CO2 30 (H) 12/08/2023    BUN 40.4 (H) 12/27/2023    BUN 32.4 (H) 12/08/2023    CR 0.95 12/27/2023    CR 0.96 (H) 12/08/2023     (H) 12/27/2023     (H) 12/08/2023     COAGS:   Lab Results   Component Value Date    PTT 23 06/16/2006    INR 2.73 (H) 12/08/2023     POC:   Lab Results   Component Value Date     (H) 04/27/2006     HEPATIC:   Lab Results   Component Value Date    ALBUMIN 3.8 11/17/2023    PROTTOTAL 7.9 11/17/2023    ALT 11 11/17/2023    AST 20 11/17/2023    ALKPHOS 90 11/17/2023    BILITOTAL 0.5 11/17/2023     OTHER:   Lab Results   Component Value  Date    LACT 0.9 05/26/2023    A1C 7.6 (H) 12/08/2023    ZAKIA 10.6 (H) 12/27/2023    PHOS 3.2 10/21/2023    MAG 1.9 10/21/2023    TSH 3.46 11/24/2023    T4 1.20 11/24/2023    CRP 16.0 (H) 03/28/2008    SED 32 (H) 03/28/2008       Anesthesia Plan    ASA Status:  3       Anesthesia Type: General.     - Airway: LMA   Induction: Intravenous, Propofol.   Maintenance: Balanced.        Consents            Postoperative Care    Pain management: Multi-modal analgesia.   PONV prophylaxis: Ondansetron (or other 5HT-3)     Comments:               Dominik Monge MD    I have reviewed the pertinent notes and labs in the chart from the past 30 days and (re)examined the patient.  Any updates or changes from those notes are reflected in this note.

## 2024-01-02 NOTE — ANESTHESIA POSTPROCEDURE EVALUATION
Patient: Linda Loredo    Procedure: Procedure(s):  APPLICATION OF SPLIT-THICKNESS SKIN GRAFT TO LEFT BELOW KNEE STUMP, GRAFT FROM LEFT THIGH  WOUND VAC APPLICATION TO LEFT BELOW KNEE STUMP       Anesthesia Type:  General    Note:     Postop Pain Control: Uneventful            Sign Out: Well controlled pain   PONV:    Neuro/Psych: Uneventful            Sign Out: Acceptable/Baseline neuro status   Airway/Respiratory: Uneventful            Sign Out: Acceptable/Baseline resp. status   CV/Hemodynamics: Uneventful            Sign Out: Acceptable CV status; No obvious hypovolemia; No obvious fluid overload   Other NRE:    DID A NON-ROUTINE EVENT OCCUR?            Last vitals:  Vitals Value Taken Time   BP 96/54 01/02/24 1215   Temp     Pulse 91 01/02/24 1228   Resp 10 01/02/24 1228   SpO2 97 % 01/02/24 1228   Vitals shown include unfiled device data.    Electronically Signed By: Dominik Monge MD  January 2, 2024  12:30 PM

## 2024-01-02 NOTE — OR NURSING
Patient able to transfer to toilet with assist of one. Patient voided without difficulty. Incontinence noted and pad changed. Patient tolerating wound vac therapy. Patient and family verbalized understanding of discharge instructions. Paper script given to family with discharge packet for review with care home staff. Report called to care home regarding ongoing care of patient post procedure. Patient transported via medical transport back to long term care facility.

## 2024-01-02 NOTE — OP NOTE
Noted evidence: Left below-knee amputation stump site wound.    Postoperative diagnosis: Same.    Procedure:  1.  Preparation of left below-knee amputation stump wound for skin grafting (75 cm ).  2.  [place] of left thigh split-thickness skin graft for skin grafting (20/1000 inch).  3.  Application of KCI wound VAC to skin grafting site.    Surgeon: Lan Otto M.D.   Assistant: Elle Eli M.D.     Anesthesia: General plus tumescent anesthetic was administered at the skin harvest site.  Estimated blood loss: About 10 mL.  Complications: None.    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  Due to severe swelling we could not complete the amputation.  The wound bed, however, has granulated and is ready for skin grafting.    Procedure details: Patient was identified and then taken to the operating room and placed in supine position.  General anesthesia was induced.  Preoperative intravenous antibiotics were administered.  The left lower extremity was circumferentially prepped and a sterile surgical field was created.    Preprocedure timeout was conducted.  We measured out the wound to be measuring 75 cm .  An area of skin was marked out in the left thigh.  Generous tumescent anesthetic was administered.  Then we scrubbed the wound with the bristled end of the sterile scrub brush and prepared the wound bed.  There was adequate pinpoint bleeding.  Then the skin was harvested with a depth of 20/1000 of an inch.  It was meshed 1-1.5.  Skin graft was applied to the wound and secured circumferentially in place with 3-0 chromic sutures.  Xeroform gauze was applied followed by KCI wound VAC sponge and adhesive.  Wound VAC sponge was activated with excellent seal.  The donor site was covered with Xeroform and clear adhesive dressing.    Consult instruments, sponges and needle was noted to be  correct.    Patient was awakened and extubated and taken to the recovery room  Condition.

## 2024-01-02 NOTE — OR NURSING
Report given to Bro GARCIA. Pt transferred to portable wound vac, all vac supplies given to pt's daughter. Printed rx for oxycodone in packet for Bro.

## 2024-01-02 NOTE — ANESTHESIA POSTPROCEDURE EVALUATION
Patient: Linda Loredo    Procedure: Procedure(s):  APPLICATION OF SPLIT-THICKNESS SKIN GRAFT TO LEFT BELOW KNEE STUMP, GRAFT FROM LEFT THIGH  WOUND VAC APPLICATION TO LEFT BELOW KNEE STUMP       Anesthesia Type:  General    Note:     Postop Pain Control: Uneventful            Sign Out: Well controlled pain   PONV:    Neuro/Psych: Uneventful            Sign Out: Acceptable/Baseline neuro status   Airway/Respiratory: Uneventful            Sign Out: Acceptable/Baseline resp. status   CV/Hemodynamics: Uneventful            Sign Out: Acceptable CV status; No obvious hypovolemia; No obvious fluid overload   Other NRE:    DID A NON-ROUTINE EVENT OCCUR?            Last vitals:  Vitals Value Taken Time   /81 01/02/24 1315   Temp 36  C (96.8  F) 01/02/24 1230   Pulse 93 01/02/24 1315   Resp 12 01/02/24 1315   SpO2 99 % 01/02/24 1315   Vitals shown include unfiled device data.    Electronically Signed By: Dominik Monge MD  January 2, 2024  3:16 PM

## 2024-01-02 NOTE — PROGRESS NOTES
PTA medications completed by Medication Scribe day of surgery     Medication history sources: Surescripts, H&P, and MAR ([facility] [phone])  In the past week, patient estimated taking medication this percent of the time: Greater than 90%      Significant changes made to the medication list:  None      Additional medication history information:   None    Medication reconciliation completed by provider prior to medication history? No    Time spent in this activity: 40 minutes    The information provided in this note is only as accurate as the sources available at the time of update(s)      Prior to Admission medications    Medication Sig Last Dose Taking? Auth Provider Long Term End Date   acetaminophen (TYLENOL) 325 MG tablet Take 2 tablets (650 mg) by mouth every 6 hours as needed for mild pain or other (and adjunct with moderate or severe pain or per patient request) Unknown at PRN Yes Velma Cloes MD     alteplase (CATHFLO ACTIVASE) 2 MG injection Triple Lumen PICC to RUE. May flush each lumen of 2mg each for blood return needs 1/1/2024 at HS Yes Haylie Rowe APRN CNP     aspirin 81 MG EC tablet Take 1 tablet (81 mg) by mouth daily 1/1/2024 at AM Yes Jennifer Hernandez DO     atorvastatin (LIPITOR) 40 MG tablet Take 1 tablet (40 mg) by mouth every evening 1/1/2024 at PM Yes Jennifer Hernandez DO Yes    bisacodyl (DULCOLAX) 10 MG suppository Place 1 suppository (10 mg) rectally daily as needed for constipation Unknown at PRN Yes Claude Rowe MD     chlorhexidine (HIBICLENS) 4 % liquid Hibiclens shower night prior to surgery on 1/1/24 AND morning of surgery on 1/2/24 1/2/2024 at AM Yes Haylie Rowe APRN CNP     diphenhydrAMINE (BENADRYL) 25 MG tablet Take 1 tablet (25 mg) by mouth every 6 hours as needed for itching or allergies Unknown at PRN Yes Haylie Rowe APRN CNP     hydrocortisone 2.5 % cream Apply topically 2 times daily Apply  to back, arms and to redness to RLE BID and BID PRN 1/2/2024 at PM Yes Haylie Rowe APRN CNP     insulin aspart (NOVOLOG PEN) 100 UNIT/ML pen Inject 1-7 Units Subcutaneous 3 times daily (before meals) 1/1/2024 at PM Yes Claude Rowe MD Yes    insulin aspart (NOVOLOG PEN) 100 UNIT/ML pen Inject 1-5 Units Subcutaneous at bedtime 1/1/2024 at PM Yes Claude Rowe MD Yes    insulin degludec (TRESIBA) 200 UNIT/ML pen Inject 14 Units Subcutaneous at bedtime Hold for blood glucose less than 100 1/1/2024 at HS Yes Reported, Patient     loperamide (IMODIUM A-D) 2 MG tablet Take 1 tablet (2 mg) by mouth 4 times daily as needed for diarrhea Unknown at PRN Yes Haylie Rowe APRN CNP     methadone (DOLOPHINE) 10 MG tablet Take 1 tablet (10 mg) by mouth daily Daily at 1500 1/1/2024 at 1500 Yes Haylie Rowe APRN CNP No    methadone (DOLOPHINE) 5 MG tablet Take 1 tablet (5 mg) by mouth 3 times daily 1/1/2024 at [facility]. Yes Haylie Rowe APRN CNP No    miconazole (MICATIN) 2 % external powder Apply topically 2 times daily Apply to buttock and fungal areas BID and BID PRN 1/1/2024 at PM Yes Haylie Rowe APRN CNP     multivitamin w/minerals (THERA-VIT-M) tablet Take 1 tablet by mouth daily Unknown at ON HOLD Yes Jennifer Hernandez,      mupirocin (BACTROBAN) 2 % external ointment Apply topically 2 times daily Apply to RLE lesion 3x weekly with lymphedema changes 1/1/2024 at AM Yes Haylie Rowe APRN CNP     ondansetron (ZOFRAN ODT) 4 MG ODT tab Take 1 tablet (4 mg) by mouth every 6 hours as needed for nausea or vomiting Unknown at PRN Yes Claude Rowe MD     polyethylene glycol (MIRALAX) 17 GM/Dose powder Take 17 g by mouth 2 times daily as needed for constipation Unknown at PRN Yes Haylie Rowe APRN CNP     senna-docusate (SENOKOT-S/PERICOLACE) 8.6-50 MG tablet Take 2 tablets by mouth 2 times daily as needed for constipation Unknown at PRN Yes Haylie Rowe APRN CNP     sodium chloride, PF, (SODIUM  CHLORIDE FLUSH) 0.9% PF flush Flush each PICC lumen (x3) with 10mL 0.9% sodium chloride daily and PRN before and after use. 1/1/2024 at NOC Yes Haylie Rowe APRN CNP     torsemide (DEMADEX) 10 MG tablet Take 2 tablets (20 mg) by mouth daily HOLD if SBP<100 1/1/2024 at AM Yes Haylie Rowe APRN CNP Yes    Warfarin Therapy Reminder Per INR Unknown Yes Haylie Rowe APRN CNP No        Medication history completed by: Gay Reilly LPN

## 2024-01-03 ENCOUNTER — TRANSITIONAL CARE UNIT VISIT (OUTPATIENT)
Dept: GERIATRICS | Facility: CLINIC | Age: 79
End: 2024-01-03
Payer: COMMERCIAL

## 2024-01-03 VITALS
TEMPERATURE: 98.4 F | RESPIRATION RATE: 18 BRPM | DIASTOLIC BLOOD PRESSURE: 67 MMHG | HEART RATE: 81 BPM | SYSTOLIC BLOOD PRESSURE: 116 MMHG | WEIGHT: 232.2 LBS | HEIGHT: 66 IN | BODY MASS INDEX: 37.32 KG/M2 | OXYGEN SATURATION: 98 %

## 2024-01-03 DIAGNOSIS — I89.0 LYMPHEDEMA: ICD-10-CM

## 2024-01-03 DIAGNOSIS — I10 ESSENTIAL HYPERTENSION: ICD-10-CM

## 2024-01-03 DIAGNOSIS — Z89.512 STATUS POST BELOW-KNEE AMPUTATION OF LEFT LOWER EXTREMITY (H): ICD-10-CM

## 2024-01-03 DIAGNOSIS — Z79.4 TYPE 2 DIABETES MELLITUS WITH DIABETIC POLYNEUROPATHY, WITH LONG-TERM CURRENT USE OF INSULIN (H): ICD-10-CM

## 2024-01-03 DIAGNOSIS — T87.89 NON-HEALING WOUND OF AMPUTATION STUMP (H): ICD-10-CM

## 2024-01-03 DIAGNOSIS — I87.8 VENOUS STASIS: ICD-10-CM

## 2024-01-03 DIAGNOSIS — Z79.01 LONG TERM (CURRENT) USE OF ANTICOAGULANTS: ICD-10-CM

## 2024-01-03 DIAGNOSIS — E78.5 DYSLIPIDEMIA: ICD-10-CM

## 2024-01-03 DIAGNOSIS — F11.20 OPIOID DEPENDENCE, UNCOMPLICATED (H): ICD-10-CM

## 2024-01-03 DIAGNOSIS — M86.672 OTHER CHRONIC OSTEOMYELITIS OF LEFT FOOT (H): ICD-10-CM

## 2024-01-03 DIAGNOSIS — G89.29 OTHER CHRONIC PAIN: ICD-10-CM

## 2024-01-03 DIAGNOSIS — E11.621 DIABETIC ULCER OF LEFT HEEL ASSOCIATED WITH TYPE 2 DIABETES MELLITUS, WITH NECROSIS OF BONE (H): ICD-10-CM

## 2024-01-03 DIAGNOSIS — N18.31 STAGE 3A CHRONIC KIDNEY DISEASE (H): ICD-10-CM

## 2024-01-03 DIAGNOSIS — J45.20 MILD INTERMITTENT ASTHMA WITHOUT COMPLICATION: ICD-10-CM

## 2024-01-03 DIAGNOSIS — L53.9 REDNESS: ICD-10-CM

## 2024-01-03 DIAGNOSIS — L97.919 VENOUS STASIS ULCERS OF BOTH LOWER EXTREMITIES (H): ICD-10-CM

## 2024-01-03 DIAGNOSIS — Z94.5 HISTORY OF SKIN GRAFT: ICD-10-CM

## 2024-01-03 DIAGNOSIS — I48.0 PAROXYSMAL ATRIAL FIBRILLATION (H): Primary | ICD-10-CM

## 2024-01-03 DIAGNOSIS — J44.9 CHRONIC OBSTRUCTIVE PULMONARY DISEASE, UNSPECIFIED COPD TYPE (H): ICD-10-CM

## 2024-01-03 DIAGNOSIS — D62 ACUTE BLOOD LOSS ANEMIA: ICD-10-CM

## 2024-01-03 DIAGNOSIS — I83.029 VENOUS STASIS ULCERS OF BOTH LOWER EXTREMITIES (H): ICD-10-CM

## 2024-01-03 DIAGNOSIS — Z98.890 STATUS POST DEBRIDEMENT: ICD-10-CM

## 2024-01-03 DIAGNOSIS — Z45.2 PICC (PERIPHERALLY INSERTED CENTRAL CATHETER) FLUSH: ICD-10-CM

## 2024-01-03 DIAGNOSIS — L30.9 DERMATITIS: ICD-10-CM

## 2024-01-03 DIAGNOSIS — D68.32 WARFARIN-INDUCED COAGULOPATHY (H): ICD-10-CM

## 2024-01-03 DIAGNOSIS — E66.01 SEVERE OBESITY (H): ICD-10-CM

## 2024-01-03 DIAGNOSIS — L97.929 VENOUS STASIS ULCERS OF BOTH LOWER EXTREMITIES (H): ICD-10-CM

## 2024-01-03 DIAGNOSIS — L89.310 PRESSURE INJURY OF RIGHT BUTTOCK, UNSTAGEABLE (H): ICD-10-CM

## 2024-01-03 DIAGNOSIS — I83.019 VENOUS STASIS ULCERS OF BOTH LOWER EXTREMITIES (H): ICD-10-CM

## 2024-01-03 DIAGNOSIS — E11.42 TYPE 2 DIABETES MELLITUS WITH DIABETIC POLYNEUROPATHY, WITH LONG-TERM CURRENT USE OF INSULIN (H): ICD-10-CM

## 2024-01-03 DIAGNOSIS — I50.812 CHRONIC RIGHT-SIDED HEART FAILURE (H): ICD-10-CM

## 2024-01-03 DIAGNOSIS — I73.9 PAD (PERIPHERAL ARTERY DISEASE) (H): ICD-10-CM

## 2024-01-03 DIAGNOSIS — L97.424 DIABETIC ULCER OF LEFT HEEL ASSOCIATED WITH TYPE 2 DIABETES MELLITUS, WITH NECROSIS OF BONE (H): ICD-10-CM

## 2024-01-03 DIAGNOSIS — F33.1 MAJOR DEPRESSIVE DISORDER, RECURRENT EPISODE, MODERATE (H): ICD-10-CM

## 2024-01-03 DIAGNOSIS — T45.515A WARFARIN-INDUCED COAGULOPATHY (H): ICD-10-CM

## 2024-01-03 DIAGNOSIS — E21.0 PRIMARY HYPERPARATHYROIDISM (H): ICD-10-CM

## 2024-01-03 PROCEDURE — 99309 SBSQ NF CARE MODERATE MDM 30: CPT | Performed by: NURSE PRACTITIONER

## 2024-01-03 RX ORDER — HYDROCORTISONE 2.5 %
CREAM (GRAM) TOPICAL 2 TIMES DAILY PRN
Start: 2024-01-03 | End: 2024-01-26

## 2024-01-03 NOTE — PROGRESS NOTES
[facility] GERIATRICS    PRIMARY CARE PROVIDER AND CLINIC:  Ish Brown MD, [facility] [address] [facility] / [facility] [address]  Chief Complaint   Patient presents with    [facility] Medical Record Number:  [id number]  Place of Service where encounter took place:  [facility] (TCU)(SNF) [[id number]]    Linda Loredo  is a 78 year old  (1945), returned to the above facility from  [facility]. Hospital stay 1/2/24 - 1/2/24. Same Day surgery noted. .   HPI:     Linda Loredo is a 78 year old female admitted on 9/29/2023. She was transferred from [facility] for L heel osteomyelitis.  She underwent BKA on 10/7/23 and further debridement on 10/14/23. Long extensive rehab at [place] until 11/15/23. Admitted to [facility] TCU for ongoing therapy and nursing needs at this time. Went out for surgery on 1/2/24 for procedure listed below--  Procedure on 1/2/24:  1.  Preparation of left below-knee amputation stump wound for skin grafting (75 cm ).  2.  [place] of left thigh split-thickness skin graft for skin grafting (20/1000 inch).  3.  Application of KCI wound VAC to skin grafting site.    The primary encounter diagnosis was Paroxysmal atrial fibrillation (H). Diagnoses of Warfarin-induced coagulopathy , Long term (current) use of anticoagulants, Opioid dependence, uncomplicated (H), Diabetic ulcer of left heel associated with type 2 diabetes mellitus, with necrosis of bone (H), Other chronic osteomyelitis of left foot (H), 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), Essential hypertension, Dyslipidemia, Stage 3a chronic kidney disease (H), Chronic right-sided heart failure (H), Type 2 diabetes mellitus with diabetic polyneuropathy, with long-term current use of insulin (H), Severe obesity (H), Venous stasis, Lymphedema, Status post debridement, Redness, PAD (peripheral artery disease) (H24),  PICC (peripherally inserted central catheter) flush, Primary hyperparathyroidism (H24), Chronic obstructive pulmonary disease, unspecified COPD type (H), Mild intermittent asthma without complication, Other chronic pain, Major depressive disorder, recurrent episode, moderate (H), Acute blood loss anemia, History of skin graft, Non-healing wound of amputation stump (H), Venous stasis ulcers of both lower extremities (H), Dermatitis, and Pressure injury of right buttock, unstageable (H) were also pertinent to this visit.    Met with patient who denies any chest pain, palpitations, shortness of breath, RASHID, lightheadedness, dizziness, or cough. Denies any abdominal discomfort. Denies N&V. Denies B&B concerns. Denies dysuria or frequency. Denies loose or constipation. Appetite good. Sleeping well. Increased complaints of pain to surgical areas. Has been using her PRN oxycodone appropriately with good relief. She reports not ideally liking to take these agents more than she should. She has follow up scheduled for Monday. Wounds to RLE appears to be scabbing over and slowly improving. PICC line to RUE does not have any blood return and she is overdue for this to be replaced. After discussion with patient, she was compliant with removal by this provider today without concerns.     BP Readings from Last 3 Encounters:   01/03/24 116/67   01/02/24 (!) 169/94   12/29/23 129/79     Wt Readings from Last 5 Encounters:   01/03/24 105.3 kg (232 lb 3.2 oz)   01/02/24 105.2 kg (232 lb)   12/29/23 106.4 kg (234 lb 9.6 oz)   12/26/23 106.7 kg (235 lb 3.2 oz)   12/22/23 106.7 kg (235 lb 3.2 oz)       CODE STATUS/ADVANCE DIRECTIVES DISCUSSION:  Prior  CPR/Full code   ALLERGIES:   Allergies   Allergen Reactions    Prednisone Nausea and Vomiting    Morphine Nausea and Vomiting    Morphine [Fumaric Acid] Nausea and Vomiting      PAST MEDICAL HISTORY:   Past Medical History:   Diagnosis Date    Depressive disorder, not elsewhere classified      Herpes zoster without mention of complication     Hypercalcemia 2/24/2007    Mild intermittent asthma     Other urinary incontinence     Type II or unspecified type diabetes mellitus with renal manifestations, uncontrolled(250.42) (H)     Type II or unspecified type diabetes mellitus without mention of complication, not stated as uncontrolled     Unspecified essential hypertension       PAST SURGICAL HISTORY:   has a past surgical history that includes Cholecystectomy; ligation of fallopian tube; Open reduction internal fixation ankle (10/13/11); pinning of left 2nd toe; Incision and drainage foot, combined (Left, 9/26/2023); IR Lower Extremity Angiogram Left (10/3/2023); Amputate leg below knee (Left, 10/7/2023); Amputate leg below knee (Left, 10/14/2023); Graft skin split thickness from trunk to head (Left, 1/2/2024); and Apply Wound Vac (Left, 1/2/2024).  FAMILY HISTORY: family history includes Cancer in her maternal grandmother and paternal grandmother; Diabetes in her sister; Heart Disease in her father; Hypertension in her mother and sister; Psychotic Disorder in her sister; Respiratory in her sister.  SOCIAL HISTORY:   reports that she has never smoked. She has never used smokeless tobacco. She reports that she does not drink alcohol and does not use drugs.  Patient's living condition: lives alone    Post Discharge Medication Reconciliation Status:   MED REC REQUIRED  Post Medication Reconciliation Status:  Discharge medications reconciled and changed, see notes/orders       Current Outpatient Medications   Medication Sig    hydrocortisone 2.5 % cream Apply topically 2 times daily as needed    methadone (DOLOPHINE) 10 MG tablet Take 1 tablet (10 mg) by mouth daily Daily at 1500    methadone (DOLOPHINE) 5 MG tablet Take 1 tablet (5 mg) by mouth 3 times daily    oxyCODONE (ROXICODONE) 5 MG tablet Take 1-2 tablets (5-10 mg) by mouth every 4 hours as needed for moderate to severe pain 1 tablet for pain 1-5 and  2 tablets for pain >6    acetaminophen (TYLENOL) 325 MG tablet Take 2 tablets (650 mg) by mouth every 6 hours as needed for mild pain or other (and adjunct with moderate or severe pain or per patient request)    aspirin 81 MG EC tablet Take 1 tablet (81 mg) by mouth daily    atorvastatin (LIPITOR) 40 MG tablet Take 1 tablet (40 mg) by mouth every evening    bisacodyl (DULCOLAX) 10 MG suppository Place 1 suppository (10 mg) rectally daily as needed for constipation    diphenhydrAMINE (BENADRYL) 25 MG tablet Take 1 tablet (25 mg) by mouth every 6 hours as needed for itching or allergies    insulin aspart (NOVOLOG PEN) 100 UNIT/ML pen Inject 1-7 Units Subcutaneous 3 times daily (before meals)    insulin aspart (NOVOLOG PEN) 100 UNIT/ML pen Inject 1-5 Units Subcutaneous at bedtime    insulin degludec (TRESIBA) 200 UNIT/ML pen Inject 14 Units Subcutaneous at bedtime Hold for blood glucose less than 100    loperamide (IMODIUM A-D) 2 MG tablet Take 1 tablet (2 mg) by mouth 4 times daily as needed for diarrhea    miconazole (MICATIN) 2 % external powder Apply topically 2 times daily Apply to buttock and fungal areas BID and BID PRN    multivitamin w/minerals (THERA-VIT-M) tablet Take 1 tablet by mouth daily    mupirocin (BACTROBAN) 2 % external ointment Apply topically 2 times daily Apply to RLE lesion 3x weekly with lymphedema changes    ondansetron (ZOFRAN ODT) 4 MG ODT tab Take 1 tablet (4 mg) by mouth every 6 hours as needed for nausea or vomiting    polyethylene glycol (MIRALAX) 17 GM/Dose powder Take 17 g by mouth 2 times daily as needed for constipation    senna-docusate (SENOKOT-S/PERICOLACE) 8.6-50 MG tablet Take 2 tablets by mouth 2 times daily as needed for constipation    torsemide (DEMADEX) 10 MG tablet Take 2 tablets (20 mg) by mouth daily HOLD if SBP<100    Warfarin Therapy Reminder Per INR     No current facility-administered medications for this visit.       ROS:  10 point ROS of systems including  "Constitutional, Eyes, Respiratory, Cardiovascular, Gastroenterology, Genitourinary, Integumentary, Musculoskeletal, Psychiatric were all negative except for pertinent positives noted in my HPI.    Vitals:  /67   Pulse 81   Temp 98.4  F (36.9  C)   Resp 18   Ht 1.676 m (5' 6\")   Wt 105.3 kg (232 lb 3.2 oz)   SpO2 98%   BMI 37.48 kg/m    Exam:  GENERAL APPEARANCE:  Alert, in no distress, oriented, cooperative  ENT:  Mouth and posterior oropharynx normal, moist mucous membranes, normal hearing acuity  EYES:  EOM, conjunctivae, lids, pupils and irises normal  NECK:  No adenopathy,masses or thyromegaly  RESP:  respiratory effort and palpation of chest normal, lungs clear to auscultation , no respiratory distress  CV:  Palpation and auscultation of heart done , regular rate and rhythm, no murmur, rub, or gallop  ABDOMEN:  normal bowel sounds, soft, nontender, no hepatosplenomegaly or other masses, no guarding or rebound  M/S:   Slide board transfers. Staff to assist as directed  SKIN:  Inspection of skin and subcutaneous tissue baseline, see photos. Wound vac in place.   NEURO:   Cranial nerves 2-12 are normal tested and grossly at patient's baseline, no purposeful movement in upper and lower extremities  PSYCH:  oriented X 3, normal insight, judgement and memory, affect and mood normal                                Lab/Diagnostic data:  Most Recent 3 CBC's:  Recent Labs   Lab Test 12/27/23  0958 12/08/23  0600 11/24/23  1012   WBC 8.0 7.5 7.0   HGB 10.5* 9.6* 9.9*   MCV 90 91 94    198 228     Most Recent 3 BMP's:  Recent Labs   Lab Test 01/02/24  1225 01/02/24  1136 01/02/24  0804 12/27/23  0958 12/08/23  1226   NA  --   --  136 137 137   POTASSIUM  --   --  4.1 4.5 4.6   CHLORIDE  --   --  94* 96* 98   CO2  --   --  34* 32* 30*   BUN  --   --  37.1* 40.4* 32.4*   CR  --   --  0.87 0.95 0.96*   ANIONGAP  --   --  8 9 9   ZAKIA  --   --  11.0* 10.6* 10.1   * 226* 232* 261* 185*     Most Recent 2 " LFT's:  Recent Labs   Lab Test 11/17/23  1033 11/01/23  0613   AST 20 19   ALT 11 12   ALKPHOS 90 97   BILITOTAL 0.5 0.2     Most Recent 3 INR's:  Recent Labs   Lab Test 01/02/24  0857 12/08/23  1226 11/24/23  1012   INR 1.47* 2.73* 3.50*     Most Recent TSH and T4:  Recent Labs   Lab Test 11/24/23  1012   TSH 3.46   T4 1.20     Most Recent Hemoglobin A1c:  Recent Labs   Lab Test 01/02/24  0804   A1C 8.7*     Most Recent Anemia Panel:  Recent Labs   Lab Test 12/27/23  0958   WBC 8.0   HGB 10.5*   HCT 33.5*   MCV 90          ASSESSMENT/PLAN:  (E11.621,  L97.509,  Z79.4) Type 2 diabetes mellitus with foot ulcer, with long-term current use of insulin (H)  (E66.01) Obesity, Class III, BMI 40-49.9 (morbid obesity) (H)  Comment: Chronic. Last A1c 7% in sept 2023. Results 12/8/23 were 7.6%. Goal <9%.   Plan:   -Monitor Blood Glucose QID as directed  -Continue sliding scale insulin as directed TID with meals and HS  -Continue tresiba 14 units at HS--Give 10Units at HS night prior to surgery as directed  -Obtain hgb A1c in 3 months. Due march 2024     (I48.0) Paroxysmal atrial fibrillation (H)  (Z79.01) Long term (current) use of anticoagulants  (D68.32,  T45.515A) Warfarin-induced coagulopathy   (I10) Essential hypertension with goal blood pressure less than 140/90  (E78.2) Mixed hyperlipidemia  (N18.30) Stage 3 chronic kidney disease, unspecified whether stage 3a or 3b CKD (H)  (I50.812) Chronic right-sided heart failure (H)  Comment: Chronic. Baseline Creatinine~ 1-1.2. Based on JNC-8 goals,  patients age of 78 year old, presence of diabetes or CKD, and goals of care goal BP is  <140/90 mm Hg. Patient is stable with current plan of care and routine assessment..Holding lisinopril per chart review per patient wishes. INR goal 2-3.INR via fingerstick on 12/29 was 2.6. Coumadin was placed on HOLD pending surgery. She returned without any orders. INR on 1/2/24 was 1.47 in hospital prior to discharge. INR today via  fingerstick-1.3  Plan:   -Monitor for worsening s/symptoms of concerns  -Monitor BP and HR as directed  -Continue asa 81mg daily  -Continue atorvastatin 40mg daily  -Start coumadin 2mg daily. Recheck INR via fingerstick on 1/5/24 for spot check  -Monitor weights as directed. Baseline weight around 222-224# on admission but now weight noted to be 232#.   -Continue torsemide 20mg daily. HOLD if SBP<100  -BMP, CBC and INR due 1/8/24     (R60.0) Edema of right extremity  (L53.9) Redness  (L03.115) Cellulitis of right lower extremity  Comment: Acute on chronic. Noted increased redness to RLE with warmth and pain soon after admission. Currently on coumadin. US negative for DVT risks. 4 areas areas are present to right leg. 3 anterior and 1 posterior (see photo above for reference). History of completing keflex with good improvement, but now redness persists to anterior portion of calf. Patient reports redness comes and goes daily. Redness was marked with black marker, Offered to start antibiotic course now, however patient wants to wait and monitor for now. I advised her that if redness persists or worsens, then to notify staff and we will start antibiotic treatment right away. In agreement to this plan below.  Unable to start prednisone given allergy risk. Suspect edema may also be contributing to wound concerns to RLE. SLOWLY IMPROVING with 10 day course of clindamycin  Plan:  -Lymphedema therapy to assess and assist with edema control---currently on hold for now per request  -Continue wound care to RLE for now: Cleanse with normal saline. Apply bactroban to open areas BID. Leave NANCY. Offered more intervention with dressings, however she declines at this time.   -Monitor for worsening s/symptoms of concerns  -Continue benadryl every 6 hours PRN  -Continue hydrocortisone cream BID PRN  -BMP, CBC and INR due 1/8/24     (E11.621,  L97.425) Diabetic ulcer of left heel associated with type 2 diabetes mellitus, with muscle  involvement without evidence of necrosis (H)  (Z89.512) 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)  (Z98.890) Status post debridement  (M86.9) Osteomyelitis of left foot, unspecified type (H)  (F11.20) Opioid dependence, uncomplicated (H)  (G89.4) Chronic pain syndrome  (I83.019,  I83.029,  L97.919,  L97.929) Venous stasis ulcers of both lower extremities (H)  (I89.0) Lymphedema  (Z94.5) History of skin graft placement on 1/2/24 as above  Comment: Chronic. S/p debridement 9/26/23. S/p Left guillotine BKA on 10/7/23. S/p tibial/fibular resection and debridement of stump 10/17/23. Followed by vascular. Completed course of meropenem/vancomycin 10/8 for osteomyelitis. Surgery completed 1/2/24 per review.   Plan:   -Monitor pain complaints  -In house wound provider to follow for ongoing needs   -Continue methadone 10mg daily at 1500 and 5mg TID scheduled.   -Tylenol PRN  -Please keep the dressings and wound vac on the left leg until the clinic visit on next Monday.   -BMP, CBC and INR due 1/8/24  -Follow up with vascular as directed. Scheduled for in clinic appt on 1/8/24  -OK to take shower tomorrow, no bath tub/swimming/submerging in water for next 6 weeks. Please be careful not to wet the left leg.  Please be cautious when moving not to disturb wound vac on the left leg.  -Apply hemp lotion per her request to right heel dry skin BID--keep at bedside and staff to apply accordingly     Electronically Signed by:  Dr. Haylie Rowe DNP, APRN, FNP-C, WCS-C, EDS-C

## 2024-01-03 NOTE — LETTER
1/3/2024        RE: Linda Loredo  [address] [facility] [address]        [facility] GERIATRICS    PRIMARY CARE PROVIDER AND CLINIC:  Ish Brown MD, [facility] [address] [facility] / [facility] [address]  Chief Complaint   Patient presents with     RECHECK      [place] Medical Record Number:  [id number]  Place of Service where encounter took place:  [facility] (TC[facility])([facility]) [[id number]]    Linda Loredo  is a 78 year old  (1945), returned to the above facility from  [facility]. Hospital stay 1/2/24 - 1/2/24. Same Day surgery noted. .   HPI:     Linda Loredo is a 78 year old female admitted on 9/29/2023. She was transferred from [facility] for L heel osteomyelitis.  She underwent BKA on 10/7/23 and further debridement on 10/14/23. Long extensive rehab at [place] until 11/15/23. Admitted to [facility] TCU for ongoing therapy and nursing needs at this time. Went out for surgery on 1/2/24 for procedure listed below--  Procedure on 1/2/24:  1.  Preparation of left below-knee amputation stump wound for skin grafting (75 cm ).  2.  Sumas of left thigh split-thickness skin graft for skin grafting (20/1000 inch).  3.  Application of KCI wound VAC to skin grafting site.    The primary encounter diagnosis was Paroxysmal atrial fibrillation (H). Diagnoses of Warfarin-induced coagulopathy , Long term (current) use of anticoagulants, Opioid dependence, uncomplicated (H), Diabetic ulcer of left heel associated with type 2 diabetes mellitus, with necrosis of bone (H), Other chronic osteomyelitis of left foot (H), 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), Essential hypertension, Dyslipidemia, Stage 3a chronic kidney disease (H), Chronic right-sided heart failure (H), Type 2 diabetes mellitus with diabetic polyneuropathy, with long-term current use of insulin (H), Severe obesity  (H), Venous stasis, Lymphedema, Status post debridement, Redness, PAD (peripheral artery disease) (H24), PICC (peripherally inserted central catheter) flush, Primary hyperparathyroidism (H24), Chronic obstructive pulmonary disease, unspecified COPD type (H), Mild intermittent asthma without complication, Other chronic pain, Major depressive disorder, recurrent episode, moderate (H), Acute blood loss anemia, History of skin graft, Non-healing wound of amputation stump (H), Venous stasis ulcers of both lower extremities (H), Dermatitis, and Pressure injury of right buttock, unstageable (H) were also pertinent to this visit.    Met with patient who denies any chest pain, palpitations, shortness of breath, RASHID, lightheadedness, dizziness, or cough. Denies any abdominal discomfort. Denies N&V. Denies B&B concerns. Denies dysuria or frequency. Denies loose or constipation. Appetite good. Sleeping well. Increased complaints of pain to surgical areas. Has been using her PRN oxycodone appropriately with good relief. She reports not ideally liking to take these agents more than she should. She has follow up scheduled for Monday. Wounds to RLE appears to be scabbing over and slowly improving. PICC line to RUE does not have any blood return and she is overdue for this to be replaced. After discussion with patient, she was compliant with removal by this provider today without concerns.     BP Readings from Last 3 Encounters:   01/03/24 116/67   01/02/24 (!) 169/94   12/29/23 129/79     Wt Readings from Last 5 Encounters:   01/03/24 105.3 kg (232 lb 3.2 oz)   01/02/24 105.2 kg (232 lb)   12/29/23 106.4 kg (234 lb 9.6 oz)   12/26/23 106.7 kg (235 lb 3.2 oz)   12/22/23 106.7 kg (235 lb 3.2 oz)       CODE STATUS/ADVANCE DIRECTIVES DISCUSSION:  Prior  CPR/Full code   ALLERGIES:   Allergies   Allergen Reactions     Prednisone Nausea and Vomiting     Morphine Nausea and Vomiting     Morphine [Fumaric Acid] Nausea and Vomiting      PAST  MEDICAL HISTORY:   Past Medical History:   Diagnosis Date     Depressive disorder, not elsewhere classified      Herpes zoster without mention of complication      Hypercalcemia 2/24/2007     Mild intermittent asthma      Other urinary incontinence      Type II or unspecified type diabetes mellitus with renal manifestations, uncontrolled(250.42) (H)      Type II or unspecified type diabetes mellitus without mention of complication, not stated as uncontrolled      Unspecified essential hypertension       PAST SURGICAL HISTORY:   has a past surgical history that includes Cholecystectomy; ligation of fallopian tube; Open reduction internal fixation ankle (10/13/11); pinning of left 2nd toe; Incision and drainage foot, combined (Left, 9/26/2023); IR Lower Extremity Angiogram Left (10/3/2023); Amputate leg below knee (Left, 10/7/2023); Amputate leg below knee (Left, 10/14/2023); Graft skin split thickness from trunk to head (Left, 1/2/2024); and Apply Wound Vac (Left, 1/2/2024).  FAMILY HISTORY: family history includes Cancer in her maternal grandmother and paternal grandmother; Diabetes in her sister; Heart Disease in her father; Hypertension in her mother and sister; Psychotic Disorder in her sister; Respiratory in her sister.  SOCIAL HISTORY:   reports that she has never smoked. She has never used smokeless tobacco. She reports that she does not drink alcohol and does not use drugs.  Patient's living condition: lives alone    Post Discharge Medication Reconciliation Status:   MED REC REQUIRED  Post Medication Reconciliation Status:  Discharge medications reconciled and changed, see notes/orders       Current Outpatient Medications   Medication Sig     hydrocortisone 2.5 % cream Apply topically 2 times daily as needed     methadone (DOLOPHINE) 10 MG tablet Take 1 tablet (10 mg) by mouth daily Daily at 1500     methadone (DOLOPHINE) 5 MG tablet Take 1 tablet (5 mg) by mouth 3 times daily     oxyCODONE (ROXICODONE) 5 MG  tablet Take 1-2 tablets (5-10 mg) by mouth every 4 hours as needed for moderate to severe pain 1 tablet for pain 1-5 and 2 tablets for pain >6     acetaminophen (TYLENOL) 325 MG tablet Take 2 tablets (650 mg) by mouth every 6 hours as needed for mild pain or other (and adjunct with moderate or severe pain or per patient request)     aspirin 81 MG EC tablet Take 1 tablet (81 mg) by mouth daily     atorvastatin (LIPITOR) 40 MG tablet Take 1 tablet (40 mg) by mouth every evening     bisacodyl (DULCOLAX) 10 MG suppository Place 1 suppository (10 mg) rectally daily as needed for constipation     diphenhydrAMINE (BENADRYL) 25 MG tablet Take 1 tablet (25 mg) by mouth every 6 hours as needed for itching or allergies     insulin aspart (NOVOLOG PEN) 100 UNIT/ML pen Inject 1-7 Units Subcutaneous 3 times daily (before meals)     insulin aspart (NOVOLOG PEN) 100 UNIT/ML pen Inject 1-5 Units Subcutaneous at bedtime     insulin degludec (TRESIBA) 200 UNIT/ML pen Inject 14 Units Subcutaneous at bedtime Hold for blood glucose less than 100     loperamide (IMODIUM A-D) 2 MG tablet Take 1 tablet (2 mg) by mouth 4 times daily as needed for diarrhea     miconazole (MICATIN) 2 % external powder Apply topically 2 times daily Apply to buttock and fungal areas BID and BID PRN     multivitamin w/minerals (THERA-VIT-M) tablet Take 1 tablet by mouth daily     mupirocin (BACTROBAN) 2 % external ointment Apply topically 2 times daily Apply to RLE lesion 3x weekly with lymphedema changes     ondansetron (ZOFRAN ODT) 4 MG ODT tab Take 1 tablet (4 mg) by mouth every 6 hours as needed for nausea or vomiting     polyethylene glycol (MIRALAX) 17 GM/Dose powder Take 17 g by mouth 2 times daily as needed for constipation     senna-docusate (SENOKOT-S/PERICOLACE) 8.6-50 MG tablet Take 2 tablets by mouth 2 times daily as needed for constipation     torsemide (DEMADEX) 10 MG tablet Take 2 tablets (20 mg) by mouth daily HOLD if SBP<100     Warfarin Therapy  "Reminder Per INR     No current facility-administered medications for this visit.       ROS:  10 point ROS of systems including Constitutional, Eyes, Respiratory, Cardiovascular, Gastroenterology, Genitourinary, Integumentary, Musculoskeletal, Psychiatric were all negative except for pertinent positives noted in my HPI.    Vitals:  /67   Pulse 81   Temp 98.4  F (36.9  C)   Resp 18   Ht 1.676 m (5' 6\")   Wt 105.3 kg (232 lb 3.2 oz)   SpO2 98%   BMI 37.48 kg/m    Exam:  GENERAL APPEARANCE:  Alert, in no distress, oriented, cooperative  ENT:  Mouth and posterior oropharynx normal, moist mucous membranes, normal hearing acuity  EYES:  EOM, conjunctivae, lids, pupils and irises normal  NECK:  No adenopathy,masses or thyromegaly  RESP:  respiratory effort and palpation of chest normal, lungs clear to auscultation , no respiratory distress  CV:  Palpation and auscultation of heart done , regular rate and rhythm, no murmur, rub, or gallop  ABDOMEN:  normal bowel sounds, soft, nontender, no hepatosplenomegaly or other masses, no guarding or rebound  M/S:   Slide board transfers. Staff to assist as directed  SKIN:  Inspection of skin and subcutaneous tissue baseline, see photos. Wound vac in place.   NEURO:   Cranial nerves 2-12 are normal tested and grossly at patient's baseline, no purposeful movement in upper and lower extremities  PSYCH:  oriented X 3, normal insight, judgement and memory, affect and mood normal                                Lab/Diagnostic data:  Most Recent 3 CBC's:  Recent Labs   Lab Test 12/27/23  0958 12/08/23  0600 11/24/23  1012   WBC 8.0 7.5 7.0   HGB 10.5* 9.6* 9.9*   MCV 90 91 94    198 228     Most Recent 3 BMP's:  Recent Labs   Lab Test 01/02/24  1225 01/02/24  1136 01/02/24  0804 12/27/23  0958 12/08/23  1226   NA  --   --  136 137 137   POTASSIUM  --   --  4.1 4.5 4.6   CHLORIDE  --   --  94* 96* 98   CO2  --   --  34* 32* 30*   BUN  --   --  37.1* 40.4* 32.4*   CR  --   " --  0.87 0.95 0.96*   ANIONGAP  --   --  8 9 9   ZAKIA  --   --  11.0* 10.6* 10.1   * 226* 232* 261* 185*     Most Recent 2 LFT's:  Recent Labs   Lab Test 11/17/23  1033 11/01/23  0613   AST 20 19   ALT 11 12   ALKPHOS 90 97   BILITOTAL 0.5 0.2     Most Recent 3 INR's:  Recent Labs   Lab Test 01/02/24  0857 12/08/23  1226 11/24/23  1012   INR 1.47* 2.73* 3.50*     Most Recent TSH and T4:  Recent Labs   Lab Test 11/24/23  1012   TSH 3.46   T4 1.20     Most Recent Hemoglobin A1c:  Recent Labs   Lab Test 01/02/24  0804   A1C 8.7*     Most Recent Anemia Panel:  Recent Labs   Lab Test 12/27/23  0958   WBC 8.0   HGB 10.5*   HCT 33.5*   MCV 90          ASSESSMENT/PLAN:  (E11.621,  L97.509,  Z79.4) Type 2 diabetes mellitus with foot ulcer, with long-term current use of insulin (H)  (E66.01) Obesity, Class III, BMI 40-49.9 (morbid obesity) (H)  Comment: Chronic. Last A1c 7% in sept 2023. Results 12/8/23 were 7.6%. Goal <9%.   Plan:   -Monitor Blood Glucose QID as directed  -Continue sliding scale insulin as directed TID with meals and HS  -Continue tresiba 14 units at HS--Give 10Units at HS night prior to surgery as directed  -Obtain hgb A1c in 3 months. Due march 2024     (I48.0) Paroxysmal atrial fibrillation (H)  (Z79.01) Long term (current) use of anticoagulants  (D68.32,  T45.515A) Warfarin-induced coagulopathy   (I10) Essential hypertension with goal blood pressure less than 140/90  (E78.2) Mixed hyperlipidemia  (N18.30) Stage 3 chronic kidney disease, unspecified whether stage 3a or 3b CKD (H)  (I50.812) Chronic right-sided heart failure (H)  Comment: Chronic. Baseline Creatinine~ 1-1.2. Based on JNC-8 goals,  patients age of 78 year old, presence of diabetes or CKD, and goals of care goal BP is  <140/90 mm Hg. Patient is stable with current plan of care and routine assessment..Holding lisinopril per chart review per patient wishes. INR goal 2-3.INR via fingerstick on 12/29 was 2.6. Coumadin was placed on  HOLD pending surgery. She returned without any orders. INR on 1/2/24 was 1.47 in hospital prior to discharge. INR today via fingerstick-1.3  Plan:   -Monitor for worsening s/symptoms of concerns  -Monitor BP and HR as directed  -Continue asa 81mg daily  -Continue atorvastatin 40mg daily  -Start coumadin 2mg daily. Recheck INR via fingerstick on 1/5/24 for spot check  -Monitor weights as directed. Baseline weight around 222-224# on admission but now weight noted to be 232#.   -Continue torsemide 20mg daily. HOLD if SBP<100  -BMP, CBC and INR due 1/8/24     (R60.0) Edema of right extremity  (L53.9) Redness  (L03.115) Cellulitis of right lower extremity  Comment: Acute on chronic. Noted increased redness to RLE with warmth and pain soon after admission. Currently on coumadin. US negative for DVT risks. 4 areas areas are present to right leg. 3 anterior and 1 posterior (see photo above for reference). History of completing keflex with good improvement, but now redness persists to anterior portion of calf. Patient reports redness comes and goes daily. Redness was marked with black marker, Offered to start antibiotic course now, however patient wants to wait and monitor for now. I advised her that if redness persists or worsens, then to notify staff and we will start antibiotic treatment right away. In agreement to this plan below.  Unable to start prednisone given allergy risk. Suspect edema may also be contributing to wound concerns to RLE. SLOWLY IMPROVING with 10 day course of clindamycin  Plan:  -Lymphedema therapy to assess and assist with edema control---currently on hold for now per request  -Continue wound care to RLE for now: Cleanse with normal saline. Apply bactroban to open areas BID. Leave NANCY. Offered more intervention with dressings, however she declines at this time.   -Monitor for worsening s/symptoms of concerns  -Continue benadryl every 6 hours PRN  -Continue hydrocortisone cream BID PRN  -BMP, CBC and  INR due 1/8/24     (E11.621,  L97.425) Diabetic ulcer of left heel associated with type 2 diabetes mellitus, with muscle involvement without evidence of necrosis (H)  (Z89.512) 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)  (Z98.890) Status post debridement  (M86.9) Osteomyelitis of left foot, unspecified type (H)  (F11.20) Opioid dependence, uncomplicated (H)  (G89.4) Chronic pain syndrome  (I83.019,  I83.029,  L97.919,  L97.929) Venous stasis ulcers of both lower extremities (H)  (I89.0) Lymphedema  (Z94.5) History of skin graft placement on 1/2/24 as above  Comment: Chronic. S/p debridement 9/26/23. S/p Left guillotine BKA on 10/7/23. S/p tibial/fibular resection and debridement of stump 10/17/23. Followed by vascular. Completed course of meropenem/vancomycin 10/8 for osteomyelitis. Surgery completed 1/2/24 per review.   Plan:   -Monitor pain complaints  -In house wound provider to follow for ongoing needs   -Continue methadone 10mg daily at 1500 and 5mg TID scheduled.   -Tylenol PRN  -Please keep the dressings and wound vac on the left leg until the clinic visit on next Monday.   -BMP, CBC and INR due 1/8/24  -Follow up with vascular as directed. Scheduled for in clinic appt on 1/8/24  -OK to take shower tomorrow, no bath tub/swimming/submerging in water for next 6 weeks. Please be careful not to wet the left leg.  Please be cautious when moving not to disturb wound vac on the left leg.  -Apply hemp lotion per her request to right heel dry skin BID--keep at bedside and staff to apply accordingly     Electronically Signed by:  Dr. Haylie Rowe DNP, APRN, FNP-C, WCS-C, EDS-C                         Sincerely,        Haylie Rowe, APRN CNP

## 2024-01-04 LAB
ATRIAL RATE - MUSE: 182 BPM
DIASTOLIC BLOOD PRESSURE - MUSE: NORMAL MMHG
INTERPRETATION ECG - MUSE: NORMAL
P AXIS - MUSE: NORMAL DEGREES
PR INTERVAL - MUSE: NORMAL MS
QRS DURATION - MUSE: 82 MS
QT - MUSE: 318 MS
QTC - MUSE: 403 MS
R AXIS - MUSE: -19 DEGREES
SYSTOLIC BLOOD PRESSURE - MUSE: NORMAL MMHG
T AXIS - MUSE: 12 DEGREES
VENTRICULAR RATE- MUSE: 97 BPM

## 2024-01-04 NOTE — PROGRESS NOTES
"[facility] GERIATRICS  [place] Medical Record Number:  [id number]  Place of Service where encounter took place: [facility])([facility]) [[id number]]    HPI:    Linda Loredo is a 78 year old  (1945), who is being seen today for an episodic care visit at the above location. Today's concern is INR/Coumadin management for NICO Fib    ROS/Subjective:  Bleeding Signs/Symptoms:  None  Thromboembolic Signs/Symptoms:  None  Medication Changes:  No  Dietary Changes:  No  Activity Changes: No  Bacterial/Viral Infection:  No  Missed Coumadin Doses:  HEld 12/29 until 1/2/24 due to surgery.  On ASA: Yes - 81 mg po q day  Other Concerns:  No    OBJECTIVE:  /72   Pulse 84   Temp 97.5  F (36.4  C)   Resp 18   Ht 1.676 m (5' 6\")   Wt 108 kg (238 lb)   SpO2 98%   BMI 38.41 kg/m    Last INR: 1.3 on 1/3/24 via fingerstick  INR Today:  1.2 via fingerstick  Current Dose:  2mg daily since 1/3/24    ASSESSMENT:     Paroxysmal atrial fibrillation (H)  Warfarin-induced coagulopathy   Long term (current) use of anticoagulants  Encounter for therapeutic drug monitoring  Subtherapeutic INR for goal of 2-3    PLAN/ORDERS:   New Dose: No Change    Next INR: Monday 1/8/24    Electronically signed by:  Dr. Haylie Rowe DNP, APRN, FNP-C, WCS-C, EDS-C      "

## 2024-01-05 ENCOUNTER — TRANSITIONAL CARE UNIT VISIT (OUTPATIENT)
Dept: GERIATRICS | Facility: CLINIC | Age: 79
End: 2024-01-05
Payer: COMMERCIAL

## 2024-01-05 VITALS
WEIGHT: 238 LBS | TEMPERATURE: 97.5 F | HEIGHT: 66 IN | BODY MASS INDEX: 38.25 KG/M2 | DIASTOLIC BLOOD PRESSURE: 72 MMHG | HEART RATE: 84 BPM | SYSTOLIC BLOOD PRESSURE: 132 MMHG | RESPIRATION RATE: 18 BRPM | OXYGEN SATURATION: 98 %

## 2024-01-05 DIAGNOSIS — D68.32 WARFARIN-INDUCED COAGULOPATHY (H): ICD-10-CM

## 2024-01-05 DIAGNOSIS — Z51.81 ENCOUNTER FOR THERAPEUTIC DRUG MONITORING: ICD-10-CM

## 2024-01-05 DIAGNOSIS — Z79.01 LONG TERM (CURRENT) USE OF ANTICOAGULANTS: ICD-10-CM

## 2024-01-05 DIAGNOSIS — T45.515A WARFARIN-INDUCED COAGULOPATHY (H): ICD-10-CM

## 2024-01-05 DIAGNOSIS — I48.0 PAROXYSMAL ATRIAL FIBRILLATION (H): Primary | ICD-10-CM

## 2024-01-05 PROCEDURE — 99308 SBSQ NF CARE LOW MDM 20: CPT | Performed by: NURSE PRACTITIONER

## 2024-01-05 NOTE — LETTER
"    1/5/2024        RE: Linda Loredo  [address] [address]        [facility]  [place] Medical Record Number:  [id number]  Place of Service where encounter took place: [facility] ([facility])([facility]) [[id number]]    HPI:    Linda Loredo is a 78 year old  (1945), who is being seen today for an episodic care visit at the above location. Today's concern is INR/Coumadin management for ELIE. Fib    ROS/Subjective:  Bleeding Signs/Symptoms:  None  Thromboembolic Signs/Symptoms:  None  Medication Changes:  No  Dietary Changes:  No  Activity Changes: No  Bacterial/Viral Infection:  No  Missed Coumadin Doses:  HEld 12/29 until 1/2/24 due to surgery.  On ASA: Yes - 81 mg po q day  Other Concerns:  No    OBJECTIVE:  /72   Pulse 84   Temp 97.5  F (36.4  C)   Resp 18   Ht 1.676 m (5' 6\")   Wt 108 kg (238 lb)   SpO2 98%   BMI 38.41 kg/m    Last INR: 1.3 on 1/3/24 via fingerstick  INR Today:  1.2 via fingerstick  Current Dose:  2mg daily since 1/3/24    ASSESSMENT:     Paroxysmal atrial fibrillation (H)  Warfarin-induced coagulopathy   Long term (current) use of anticoagulants  Encounter for therapeutic drug monitoring  Subtherapeutic INR for goal of 2-3    PLAN/ORDERS:   New Dose: No Change    Next INR: Monday 1/8/24    Electronically signed by:  Dr. Haylie Rowe DNP, APRN, FNP-C, WCS-C, EDS-C          Sincerely,        Haylie Rowe, MOE CNP      "

## 2024-01-07 ENCOUNTER — LAB REQUISITION (OUTPATIENT)
Dept: LAB | Facility: CLINIC | Age: 79
End: 2024-01-07
Payer: COMMERCIAL

## 2024-01-07 DIAGNOSIS — I10 ESSENTIAL (PRIMARY) HYPERTENSION: ICD-10-CM

## 2024-01-07 DIAGNOSIS — N18.4 CHRONIC KIDNEY DISEASE, STAGE 4 (SEVERE) (H): ICD-10-CM

## 2024-01-07 DIAGNOSIS — I48.21 PERMANENT ATRIAL FIBRILLATION (H): ICD-10-CM

## 2024-01-07 DIAGNOSIS — D64.9 ANEMIA, UNSPECIFIED: ICD-10-CM

## 2024-01-07 NOTE — PROGRESS NOTES
[facility] GERIATRICS    Chief Complaint   Patient presents with    RECHECK     HPI:  Linda Loredo is a 78 year old  (1945), who is being seen today for an episodic care visit at: [facility]) [[id number]]. Today's concern is: The primary encounter diagnosis was Paroxysmal atrial fibrillation (H). Diagnoses of Warfarin-induced coagulopathy  (H24), Long term (current) use of anticoagulants, Opioid dependence, uncomplicated (H), Diabetic ulcer of left heel associated with type 2 diabetes mellitus, with necrosis of bone (H), Other chronic osteomyelitis of left foot (H), 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), Essential hypertension, Dyslipidemia, Stage 3a chronic kidney disease (H), Chronic right-sided heart failure (H), Type 2 diabetes mellitus with diabetic polyneuropathy, with long-term current use of insulin (H), Severe obesity (H), Venous stasis, Lymphedema, Status post debridement, Redness, PAD (peripheral artery disease) (H24), Pressure injury of skin, unspecified injury stage, unspecified location, Drug-induced constipation, Nausea, Slow transit constipation, Diarrhea, unspecified type, and Essential hypertension with goal blood pressure less than 140/90 were also pertinent to this visit.    Met with patient who came back from vascular appt today. She reports she was told by vascular that they suspected her wound will never heal, when she heard that she reports being upset and she was not told this before. However, per appearance of wound today vascular is hopeful that this will heal. They gave orders and recommendations for wound changes with follow up with them via telephone on 1/22/24. She denies any chest pain, palpitations, shortness of breath, RASHID, lightheadedness, dizziness, or cough. Denies any abdominal discomfort. Denies N&V. Denies B&B concerns. Denies dysuria or frequency. Denies loose or constipation. Appetite  "good. Sleeping well. Increased redness and edema present to RLE. Wounds are slowly scabbing over and healing. Open to recommendations below. Mood stable. She also reports finding [facility] placement with [facility]. Pending Discharge plans in future when able.     BP Readings from Last 3 Encounters:   01/08/24 117/77   01/08/24 126/72   01/05/24 132/72     Wt Readings from Last 5 Encounters:   01/08/24 105.9 kg (233 lb 6.4 oz)   01/05/24 108 kg (238 lb)   01/03/24 105.3 kg (232 lb 3.2 oz)   01/02/24 105.2 kg (232 lb)   12/29/23 106.4 kg (234 lb 9.6 oz)     Allergies, and PMH/PSH reviewed in EPIC today.  REVIEW OF SYSTEMS:  4 point ROS including Respiratory, CV, GI and , other than that noted in the HPI,  is negative    Objective:   /72   Pulse 73   Temp 97.9  F (36.6  C)   Resp 18   Ht 1.676 m (5' 6\")   Wt 105.9 kg (233 lb 6.4 oz)   SpO2 99%   BMI 37.67 kg/m    GENERAL APPEARANCE:  Alert, in no distress, oriented, morbidly obese, cooperative  ENT:  Mouth and posterior oropharynx normal, moist mucous membranes, [place]  EYES:  EOM, conjunctivae, lids, pupils and irises normal  NECK:  No adenopathy,masses or thyromegaly  RESP:  respiratory effort and palpation of chest normal, lungs clear to auscultation , no respiratory distress  CV:  Palpation and auscultation of heart done , regular rate and rhythm, no murmur, rub, or gallop, peripheral edema 2-3+ in RLE  ABDOMEN:  normal bowel sounds, soft, nontender, no hepatosplenomegaly or other masses, no guarding or rebound  M/S:   Slide board transfers. Wheelchair bound  SKIN:  Inspection of skin and subcutaneous tissue baseline, wound healing well, no signs of infection vascular following for LLE. Redness and edema present to RLE.   NEURO:   Cranial nerves 2-12 are normal tested and grossly at patient's baseline, no purposeful movement in upper and lower extremities  PSYCH:  oriented X 3, normal insight, judgement and memory, affect and mood normal    Most " Recent 3 CBC's:  Recent Labs   Lab Test 12/27/23  0958 12/08/23  0600 11/24/23  1012   WBC 8.0 7.5 7.0   HGB 10.5* 9.6* 9.9*   MCV 90 91 94    198 228     Most Recent 3 BMP's:  Recent Labs   Lab Test 01/02/24  1225 01/02/24  1136 01/02/24  0804 12/27/23  0958 12/08/23  1226   NA  --   --  136 137 137   POTASSIUM  --   --  4.1 4.5 4.6   CHLORIDE  --   --  94* 96* 98   CO2  --   --  34* 32* 30*   BUN  --   --  37.1* 40.4* 32.4*   CR  --   --  0.87 0.95 0.96*   ANIONGAP  --   --  8 9 9   ZAKIA  --   --  11.0* 10.6* 10.1   * 226* 232* 261* 185*     Most Recent 2 LFT's:  Recent Labs   Lab Test 11/17/23  1033 11/01/23  0613   AST 20 19   ALT 11 12   ALKPHOS 90 97   BILITOTAL 0.5 0.2     Most Recent 3 INR's:  Recent Labs   Lab Test 01/02/24  0857 12/08/23  1226 11/24/23  1012   INR 1.47* 2.73* 3.50*     Most Recent Hemoglobin A1c:  Recent Labs   Lab Test 01/02/24  0804   A1C 8.7*     Most Recent Anemia Panel:  Recent Labs   Lab Test 12/27/23  0958   WBC 8.0   HGB 10.5*   HCT 33.5*   MCV 90          ASSESSMENT/PLAN:  (E11.621,  L97.509,  Z79.4) Type 2 diabetes mellitus with foot ulcer, with long-term current use of insulin (H)  (E66.01) Obesity, Class III, BMI 40-49.9 (morbid obesity) (H)  Comment: Chronic. Last A1c 7% in sept 2023. Results 12/8/23 were 7.6%. Goal <9%.   Plan:   -Monitor Blood Glucose QID as directed  -Continue sliding scale insulin as directed TID with meals and HS  -Change tresiba to 18 units at HS  -Obtain hgb A1c in 3 months. Due march 2024         (I48.0) Paroxysmal atrial fibrillation (H)  (Z79.01) Long term (current) use of anticoagulants  (D68.32,  T45.515A) Warfarin-induced coagulopathy   (I10) Essential hypertension with goal blood pressure less than 140/90  (E78.2) Mixed hyperlipidemia  (N18.30) Stage 3 chronic kidney disease, unspecified whether stage 3a or 3b CKD (H)  (I50.812) Chronic right-sided heart failure (H)  Comment: Chronic. Baseline Creatinine~ 1-1.2. Based on JNC-8  goals,  patients age of 78 year old, presence of diabetes or CKD, and goals of care goal BP is  <140/90 mm Hg. Patient is stable with current plan of care and routine assessment..Holding lisinopril per chart review per patient wishes. INR goal 2-3.INR via fingerstick on 12/29 was 2.6. Coumadin was placed on HOLD pending surgery. She returned without any orders. INR on 1/2/24 was 1.47 in hospital prior to discharge. INR on 1/5/24 was 1.2 via fingerstick. INR today-1.5 via fingerstick  Plan:   -Monitor for worsening s/symptoms of concerns  -Monitor BP and HR as directed  -Continue asa 81mg daily  -Continue atorvastatin 40mg daily  -Increase coumadin to: 2mg MWF and 2.5mg AOD  -Monitor weights as directed. Baseline weight around 222-224# on admission but now weight noted to be 233#. Highest documented was 238#.   -Increase torsemide to 30mg daily. HOLD if SBP<100  -BMP, CBC and INR due 1/8/24---results pending  -BMP and CBC due 1/26/24  -Recheck INR due 1/15/24     (R60.0) Edema of right extremity  (L53.9) Redness  Comment: Acute on chronic. Noted increased redness to RLE with warmth and pain soon after admission. Currently on coumadin. US negative for DVT risks. 4 areas areas are present to right leg. 3 anterior and 1 posterior (see photo above for reference). History of completing keflex with good improvement, but now redness persists to anterior portion of calf. Patient reports redness comes and goes daily. Redness was marked with black marker, Offered to start antibiotic course now, however patient wants to wait and monitor for now. I advised her that if redness persists or worsens, then to notify staff and we will start antibiotic treatment right away. In agreement to this plan below.  Unable to start prednisone given allergy risk. Suspect edema may also be contributing to wound concerns to RLE. SLOWLY IMPROVING with 10 day course of clindamycin  Plan:  -Lymphedema therapy to assess and assist with edema  control---currently on hold for now per request  -Continue wound care to RLE for now: Cleanse with normal saline. Apply bactroban to open areas BID. Leave NANCY. Offered more intervention with dressings, however she declines at this time.   -Monitor for worsening s/symptoms of concerns  -Continue benadryl every 6 hours PRN  -Continue hydrocortisone cream BID PRN  -BMP, CBC and INR due 1/8/24---results pending  -BMP and CBC due 1/26/24     (E11.621,  L97.425) Diabetic ulcer of left heel associated with type 2 diabetes mellitus, with muscle involvement without evidence of necrosis (H)  (Z89.512) 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)  (Z98.890) Status post debridement  (M86.9) Osteomyelitis of left foot, unspecified type (H)  (F11.20) Opioid dependence, uncomplicated (H)  (G89.4) Chronic pain syndrome  (I83.019,  I83.029,  L97.919,  L97.929) Venous stasis ulcers of both lower extremities (H)  (I89.0) Lymphedema  (Z94.5) History of skin graft placement on 1/2/24 as above  Comment: Chronic. S/p debridement 9/26/23. S/p Left guillotine BKA on 10/7/23. S/p tibial/fibular resection and debridement of stump 10/17/23. Followed by vascular. Completed course of meropenem/vancomycin 10/8 for osteomyelitis. Surgery completed 1/2/24 per review.   Plan:   -Monitor pain complaints  -In house wound provider to follow for ongoing needs   -Continue methadone 10mg daily at 1500 and 5mg TID scheduled.   -Tylenol PRN  -Discontinue PRN oxycodone due to non use  -BMP, CBC and INR due 1/8/24---results pending  -BMP and CBC due 1/26/24  -Follow up with vascular as directed. Scheduled for telephone 11/22/24  For the thigh wound(donor site): let dry out, cover as needed. Can take hair dryer to wound, after taking off ABD, for 10 minutes a day.     For Graft site(BKA stump): Place Xeroform, followed by ABD, cover with spandage or stockinette. In two weeks will have phone visit to assess wound.        -Apply hemp lotion per  her request to right heel dry skin BID--keep at bedside and staff to apply accordingly     Electronically Signed by:  Dr. Haylie Rowe DNP, APRN, FNP-C, WCS-C, EDS-C

## 2024-01-08 ENCOUNTER — OFFICE VISIT (OUTPATIENT)
Dept: OTHER | Facility: CLINIC | Age: 79
End: 2024-01-08
Payer: COMMERCIAL

## 2024-01-08 ENCOUNTER — LAB REQUISITION (OUTPATIENT)
Dept: LAB | Facility: CLINIC | Age: 79
End: 2024-01-08
Payer: COMMERCIAL

## 2024-01-08 ENCOUNTER — TRANSITIONAL CARE UNIT VISIT (OUTPATIENT)
Dept: GERIATRICS | Facility: CLINIC | Age: 79
End: 2024-01-08
Payer: COMMERCIAL

## 2024-01-08 VITALS
BODY MASS INDEX: 37.51 KG/M2 | HEART RATE: 73 BPM | SYSTOLIC BLOOD PRESSURE: 126 MMHG | OXYGEN SATURATION: 99 % | DIASTOLIC BLOOD PRESSURE: 72 MMHG | RESPIRATION RATE: 18 BRPM | WEIGHT: 233.4 LBS | HEIGHT: 66 IN | TEMPERATURE: 97.9 F

## 2024-01-08 VITALS — DIASTOLIC BLOOD PRESSURE: 77 MMHG | HEART RATE: 71 BPM | OXYGEN SATURATION: 97 % | SYSTOLIC BLOOD PRESSURE: 117 MMHG

## 2024-01-08 DIAGNOSIS — K59.01 SLOW TRANSIT CONSTIPATION: ICD-10-CM

## 2024-01-08 DIAGNOSIS — D68.32 WARFARIN-INDUCED COAGULOPATHY (H): ICD-10-CM

## 2024-01-08 DIAGNOSIS — I48.0 PAROXYSMAL ATRIAL FIBRILLATION (H): Primary | ICD-10-CM

## 2024-01-08 DIAGNOSIS — Z79.4 TYPE 2 DIABETES MELLITUS WITH DIABETIC POLYNEUROPATHY, WITH LONG-TERM CURRENT USE OF INSULIN (H): ICD-10-CM

## 2024-01-08 DIAGNOSIS — N18.4 CHRONIC KIDNEY DISEASE, STAGE 4 (SEVERE) (H): ICD-10-CM

## 2024-01-08 DIAGNOSIS — L97.424 DIABETIC ULCER OF LEFT HEEL ASSOCIATED WITH TYPE 2 DIABETES MELLITUS, WITH NECROSIS OF BONE (H): ICD-10-CM

## 2024-01-08 DIAGNOSIS — I10 ESSENTIAL HYPERTENSION WITH GOAL BLOOD PRESSURE LESS THAN 140/90: ICD-10-CM

## 2024-01-08 DIAGNOSIS — K59.03 DRUG-INDUCED CONSTIPATION: ICD-10-CM

## 2024-01-08 DIAGNOSIS — E66.01 SEVERE OBESITY (H): ICD-10-CM

## 2024-01-08 DIAGNOSIS — E11.621 DIABETIC ULCER OF LEFT HEEL ASSOCIATED WITH TYPE 2 DIABETES MELLITUS, WITH NECROSIS OF BONE (H): ICD-10-CM

## 2024-01-08 DIAGNOSIS — R52 PAIN: Primary | ICD-10-CM

## 2024-01-08 DIAGNOSIS — T45.515A WARFARIN-INDUCED COAGULOPATHY (H): ICD-10-CM

## 2024-01-08 DIAGNOSIS — I50.812 CHRONIC RIGHT-SIDED HEART FAILURE (H): ICD-10-CM

## 2024-01-08 DIAGNOSIS — Z79.01 LONG TERM (CURRENT) USE OF ANTICOAGULANTS: ICD-10-CM

## 2024-01-08 DIAGNOSIS — M86.672 OTHER CHRONIC OSTEOMYELITIS OF LEFT FOOT (H): ICD-10-CM

## 2024-01-08 DIAGNOSIS — I87.8 VENOUS STASIS: ICD-10-CM

## 2024-01-08 DIAGNOSIS — Z89.512 STATUS POST BELOW-KNEE AMPUTATION OF LEFT LOWER EXTREMITY (H): Primary | ICD-10-CM

## 2024-01-08 DIAGNOSIS — Z89.512 STATUS POST BELOW-KNEE AMPUTATION OF LEFT LOWER EXTREMITY (H): ICD-10-CM

## 2024-01-08 DIAGNOSIS — R19.7 DIARRHEA, UNSPECIFIED TYPE: ICD-10-CM

## 2024-01-08 DIAGNOSIS — I10 ESSENTIAL (PRIMARY) HYPERTENSION: ICD-10-CM

## 2024-01-08 DIAGNOSIS — R11.0 NAUSEA: ICD-10-CM

## 2024-01-08 DIAGNOSIS — E78.5 DYSLIPIDEMIA: ICD-10-CM

## 2024-01-08 DIAGNOSIS — L89.90 PRESSURE INJURY OF SKIN, UNSPECIFIED INJURY STAGE, UNSPECIFIED LOCATION: ICD-10-CM

## 2024-01-08 DIAGNOSIS — I89.0 LYMPHEDEMA: ICD-10-CM

## 2024-01-08 DIAGNOSIS — I73.9 PAD (PERIPHERAL ARTERY DISEASE) (H): ICD-10-CM

## 2024-01-08 DIAGNOSIS — Z98.890 STATUS POST DEBRIDEMENT: ICD-10-CM

## 2024-01-08 DIAGNOSIS — I10 ESSENTIAL HYPERTENSION: ICD-10-CM

## 2024-01-08 DIAGNOSIS — I48.91 UNSPECIFIED ATRIAL FIBRILLATION (H): ICD-10-CM

## 2024-01-08 DIAGNOSIS — E11.42 TYPE 2 DIABETES MELLITUS WITH DIABETIC POLYNEUROPATHY, WITH LONG-TERM CURRENT USE OF INSULIN (H): ICD-10-CM

## 2024-01-08 DIAGNOSIS — N18.31 STAGE 3A CHRONIC KIDNEY DISEASE (H): ICD-10-CM

## 2024-01-08 DIAGNOSIS — L53.9 REDNESS: ICD-10-CM

## 2024-01-08 DIAGNOSIS — D64.9 ANEMIA, UNSPECIFIED: ICD-10-CM

## 2024-01-08 DIAGNOSIS — F11.20 OPIOID DEPENDENCE, UNCOMPLICATED (H): ICD-10-CM

## 2024-01-08 PROCEDURE — 99309 SBSQ NF CARE MODERATE MDM 30: CPT | Performed by: NURSE PRACTITIONER

## 2024-01-08 PROCEDURE — 99024 POSTOP FOLLOW-UP VISIT: CPT

## 2024-01-08 PROCEDURE — G0463 HOSPITAL OUTPT CLINIC VISIT: HCPCS

## 2024-01-08 RX ORDER — OXYCODONE HYDROCHLORIDE 5 MG/1
5 TABLET ORAL
COMMUNITY
End: 2024-01-08

## 2024-01-08 RX ORDER — BISACODYL 10 MG
10 SUPPOSITORY, RECTAL RECTAL DAILY PRN
Start: 2024-01-08 | End: 2024-02-09

## 2024-01-08 RX ORDER — LOPERAMIDE HYDROCHLORIDE 2 MG/1
2 TABLET ORAL 4 TIMES DAILY PRN
Start: 2024-01-08 | End: 2024-02-09

## 2024-01-08 RX ORDER — ACETAMINOPHEN 325 MG/1
650 TABLET ORAL EVERY 6 HOURS PRN
Status: ON HOLD
Start: 2024-01-08 | End: 2024-06-10

## 2024-01-08 RX ORDER — TORSEMIDE 10 MG/1
30 TABLET ORAL DAILY
Qty: 60 TABLET | Refills: 11 | Status: SHIPPED | OUTPATIENT
Start: 2024-01-08 | End: 2024-02-02

## 2024-01-08 RX ORDER — AMOXICILLIN 250 MG
2 CAPSULE ORAL 2 TIMES DAILY PRN
Start: 2024-01-08 | End: 2024-02-23

## 2024-01-08 RX ORDER — ONDANSETRON 4 MG/1
4 TABLET, ORALLY DISINTEGRATING ORAL EVERY 6 HOURS PRN
Start: 2024-01-08

## 2024-01-08 RX ORDER — POLYETHYLENE GLYCOL 3350 17 G/17G
17 POWDER, FOR SOLUTION ORAL 2 TIMES DAILY PRN
Start: 2024-01-08

## 2024-01-08 NOTE — PROGRESS NOTES
Patient is here to discuss follow up    /77 (BP Location: Left arm, Patient Position: Chair, Cuff Size: Adult Regular)   Pulse 71   SpO2 97%     Questions patient would like addressed today are: N/A.    Refills are needed: No    Has homecare services and agency name:  [facility]

## 2024-01-08 NOTE — PATIENT INSTRUCTIONS
For the thigh wound(donor site): let dry out, cover as needed. Can take hair dryer to wound, after taking off ABD, for 10 minutes a day.    For Graft site(BKA stump): Place Xeroform, followed by ABD, cover with spandage or stockinette. In two weeks will have phone visit to assess wound.

## 2024-01-08 NOTE — LETTER
1/8/2024        RE: Linda Loredo  [address] [address] [id number]        [facility] GERIATRICS    Chief Complaint   Patient presents with     RECHECK     HPI:  Linda Loredo is a 78 year old  (1945), who is being seen today for an episodic care visit at: [facility])([facility]) [[id number]]. Today's concern is: The primary encounter diagnosis was Paroxysmal atrial fibrillation (H). Diagnoses of Warfarin-induced coagulopathy  (H24), Long term (current) use of anticoagulants, Opioid dependence, uncomplicated (H), Diabetic ulcer of left heel associated with type 2 diabetes mellitus, with necrosis of bone (H), Other chronic osteomyelitis of left foot (H), 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), Essential hypertension, Dyslipidemia, Stage 3a chronic kidney disease (H), Chronic right-sided heart failure (H), Type 2 diabetes mellitus with diabetic polyneuropathy, with long-term current use of insulin (H), Severe obesity (H), Venous stasis, Lymphedema, Status post debridement, Redness, PAD (peripheral artery disease) (H24), Pressure injury of skin, unspecified injury stage, unspecified location, Drug-induced constipation, Nausea, Slow transit constipation, Diarrhea, unspecified type, and Essential hypertension with goal blood pressure less than 140/90 were also pertinent to this visit.    Met with patient who came back from vascular appt today. She reports she was told by vascular that they suspected her wound will never heal, when she heard that she reports being upset and she was not told this before. However, per appearance of wound today vascular is hopeful that this will heal. They gave orders and recommendations for wound changes with follow up with them via telephone on 1/22/24. She denies any chest pain, palpitations, shortness of breath, RASHID, lightheadedness, dizziness, or cough. Denies any abdominal discomfort.  "Denies N&V. Denies B&B concerns. Denies dysuria or frequency. Denies loose or constipation. Appetite good. Sleeping well. Increased redness and edema present to RLE. Wounds are slowly scabbing over and healing. Open to recommendations below. Mood stable. She also reports finding [facility] placement with [facility]. Pending Discharge plans in future when able.     BP Readings from Last 3 Encounters:   01/08/24 117/77   01/08/24 126/72   01/05/24 132/72     Wt Readings from Last 5 Encounters:   01/08/24 105.9 kg (233 lb 6.4 oz)   01/05/24 108 kg (238 lb)   01/03/24 105.3 kg (232 lb 3.2 oz)   01/02/24 105.2 kg (232 lb)   12/29/23 106.4 kg (234 lb 9.6 oz)     Allergies, and PMH/PSH reviewed in EPIC today.  REVIEW OF SYSTEMS:  4 point ROS including Respiratory, CV, GI and , other than that noted in the HPI,  is negative    Objective:   /72   Pulse 73   Temp 97.9  F (36.6  C)   Resp 18   Ht 1.676 m (5' 6\")   Wt 105.9 kg (233 lb 6.4 oz)   SpO2 99%   BMI 37.67 kg/m    GENERAL APPEARANCE:  Alert, in no distress, oriented, morbidly obese, cooperative  ENT:  Mouth and posterior oropharynx normal, moist mucous membranes, [place]  EYES:  EOM, conjunctivae, lids, pupils and irises normal  NECK:  No adenopathy,masses or thyromegaly  RESP:  respiratory effort and palpation of chest normal, lungs clear to auscultation , no respiratory distress  CV:  Palpation and auscultation of heart done , regular rate and rhythm, no murmur, rub, or gallop, peripheral edema 2-3+ in RLE  ABDOMEN:  normal bowel sounds, soft, nontender, no hepatosplenomegaly or other masses, no guarding or rebound  M/S:   Slide board transfers. Wheelchair bound  SKIN:  Inspection of skin and subcutaneous tissue baseline, wound healing well, no signs of infection vascular following for LLE. Redness and edema present to RLE.   NEURO:   Cranial nerves 2-12 are normal tested and grossly at patient's baseline, no purposeful movement in upper and lower " extremities  PSYCH:  oriented X 3, normal insight, judgement and memory, affect and mood normal    Most Recent 3 CBC's:  Recent Labs   Lab Test 12/27/23  0958 12/08/23  0600 11/24/23  1012   WBC 8.0 7.5 7.0   HGB 10.5* 9.6* 9.9*   MCV 90 91 94    198 228     Most Recent 3 BMP's:  Recent Labs   Lab Test 01/02/24  1225 01/02/24  1136 01/02/24  0804 12/27/23  0958 12/08/23  1226   NA  --   --  136 137 137   POTASSIUM  --   --  4.1 4.5 4.6   CHLORIDE  --   --  94* 96* 98   CO2  --   --  34* 32* 30*   BUN  --   --  37.1* 40.4* 32.4*   CR  --   --  0.87 0.95 0.96*   ANIONGAP  --   --  8 9 9   ZAKIA  --   --  11.0* 10.6* 10.1   * 226* 232* 261* 185*     Most Recent 2 LFT's:  Recent Labs   Lab Test 11/17/23  1033 11/01/23  0613   AST 20 19   ALT 11 12   ALKPHOS 90 97   BILITOTAL 0.5 0.2     Most Recent 3 INR's:  Recent Labs   Lab Test 01/02/24  0857 12/08/23  1226 11/24/23  1012   INR 1.47* 2.73* 3.50*     Most Recent Hemoglobin A1c:  Recent Labs   Lab Test 01/02/24  0804   A1C 8.7*     Most Recent Anemia Panel:  Recent Labs   Lab Test 12/27/23  0958   WBC 8.0   HGB 10.5*   HCT 33.5*   MCV 90          ASSESSMENT/PLAN:  (E11.621,  L97.509,  Z79.4) Type 2 diabetes mellitus with foot ulcer, with long-term current use of insulin (H)  (E66.01) Obesity, Class III, BMI 40-49.9 (morbid obesity) (H)  Comment: Chronic. Last A1c 7% in sept 2023. Results 12/8/23 were 7.6%. Goal <9%.   Plan:   -Monitor Blood Glucose QID as directed  -Continue sliding scale insulin as directed TID with meals and HS  -Change tresiba to 18 units at HS  -Obtain hgb A1c in 3 months. Due march 2024         (I48.0) Paroxysmal atrial fibrillation (H)  (Z79.01) Long term (current) use of anticoagulants  (D68.32,  T45.515A) Warfarin-induced coagulopathy   (I10) Essential hypertension with goal blood pressure less than 140/90  (E78.2) Mixed hyperlipidemia  (N18.30) Stage 3 chronic kidney disease, unspecified whether stage 3a or 3b CKD  (H)  (I50.812) Chronic right-sided heart failure (H)  Comment: Chronic. Baseline Creatinine~ 1-1.2. Based on JNC-8 goals,  patients age of 78 year old, presence of diabetes or CKD, and goals of care goal BP is  <140/90 mm Hg. Patient is stable with current plan of care and routine assessment..Holding lisinopril per chart review per patient wishes. INR goal 2-3.INR via fingerstick on 12/29 was 2.6. Coumadin was placed on HOLD pending surgery. She returned without any orders. INR on 1/2/24 was 1.47 in hospital prior to discharge. INR on 1/5/24 was 1.2 via fingerstick. INR today-1.5 via fingerstick  Plan:   -Monitor for worsening s/symptoms of concerns  -Monitor BP and HR as directed  -Continue asa 81mg daily  -Continue atorvastatin 40mg daily  -Increase coumadin to: 2mg MWF and 2.5mg AOD  -Monitor weights as directed. Baseline weight around 222-224# on admission but now weight noted to be 233#. Highest documented was 238#.   -Increase torsemide to 30mg daily. HOLD if SBP<100  -BMP, CBC and INR due 1/8/24---results pending  -BMP and CBC due 1/26/24  -Recheck INR due 1/15/24     (R60.0) Edema of right extremity  (L53.9) Redness  Comment: Acute on chronic. Noted increased redness to RLE with warmth and pain soon after admission. Currently on coumadin. US negative for DVT risks. 4 areas areas are present to right leg. 3 anterior and 1 posterior (see photo above for reference). History of completing keflex with good improvement, but now redness persists to anterior portion of calf. Patient reports redness comes and goes daily. Redness was marked with black marker, Offered to start antibiotic course now, however patient wants to wait and monitor for now. I advised her that if redness persists or worsens, then to notify staff and we will start antibiotic treatment right away. In agreement to this plan below.  Unable to start prednisone given allergy risk. Suspect edema may also be contributing to wound concerns to RLE.  SLOWLY IMPROVING with 10 day course of clindamycin  Plan:  -Lymphedema therapy to assess and assist with edema control---currently on hold for now per request  -Continue wound care to RLE for now: Cleanse with normal saline. Apply bactroban to open areas BID. Leave NANCY. Offered more intervention with dressings, however she declines at this time.   -Monitor for worsening s/symptoms of concerns  -Continue benadryl every 6 hours PRN  -Continue hydrocortisone cream BID PRN  -BMP, CBC and INR due 1/8/24---results pending  -BMP and CBC due 1/26/24     (E11.621,  L97.425) Diabetic ulcer of left heel associated with type 2 diabetes mellitus, with muscle involvement without evidence of necrosis (H)  (Z89.512) 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)  (Z98.890) Status post debridement  (M86.9) Osteomyelitis of left foot, unspecified type (H)  (F11.20) Opioid dependence, uncomplicated (H)  (G89.4) Chronic pain syndrome  (I83.019,  I83.029,  L97.919,  L97.929) Venous stasis ulcers of both lower extremities (H)  (I89.0) Lymphedema  (Z94.5) History of skin graft placement on 1/2/24 as above  Comment: Chronic. S/p debridement 9/26/23. S/p Left guillotine BKA on 10/7/23. S/p tibial/fibular resection and debridement of stump 10/17/23. Followed by vascular. Completed course of meropenem/vancomycin 10/8 for osteomyelitis. Surgery completed 1/2/24 per review.   Plan:   -Monitor pain complaints  -In house wound provider to follow for ongoing needs   -Continue methadone 10mg daily at 1500 and 5mg TID scheduled.   -Tylenol PRN  -Discontinue PRN oxycodone due to non use  -BMP, CBC and INR due 1/8/24---results pending  -BMP and CBC due 1/26/24  -Follow up with vascular as directed. Scheduled for telephone 11/22/24  For the thigh wound(donor site): let dry out, cover as needed. Can take hair dryer to wound, after taking off ABD, for 10 minutes a day.     For Graft site(BKA stump): Place Xeroform, followed by ABD, cover  with spandage or stockinette. In two weeks will have phone visit to assess wound.        -Apply hemp lotion per her request to right heel dry skin BID--keep at bedside and staff to apply accordingly     Electronically Signed by:  Dr. Haylie Rowe DNP, APRN, FNP-C, WCS-C, EDS-C          Sincerely,        Haylie Rowe, MOE CNP

## 2024-01-09 RX ORDER — OXYCODONE HYDROCHLORIDE 5 MG/1
TABLET ORAL
Qty: 2 TABLET | Refills: 0 | Status: SHIPPED | OUTPATIENT
Start: 2024-01-09 | End: 2024-01-26

## 2024-01-09 NOTE — PROGRESS NOTES
VASCULAR SURGERY PROGRESS NOTE    LOCATION: [facility]    Linda Loredo  Medical Record #: [id number]  YOB: 1945  Age: 78 year old     Date of Service: 1/8/2024    PRIMARY CARE PROVIDER: Ish Brown    Reason for visit:  skin graft/wound vac removal    Margaret Loredo is a 78 year old female who underwent skin grafting on 01/02/2024 with Dr. Otto with wound vac application. She comes into clinic today for wound vac removal and assessment of her skin graft.     Wound vac taken down without incident, with 100% take of the skin graft. No issues.  Measurements are 5 cm x 4.5 cm with a depth of 0.1 cm    Donor site unremarkable.     Pat is alert and oriented x4, neurologically intact  Vitals are stable   Denies pain        RECOMMENDATION/RISKS: Overall very pleased with how Pat is doing. Would recommend Xeroform over skin graft(stump), to be changed once a day, with stockinette. Donor site can cover due to drainage, use hair dryer for 10 minutes on donor site. Will do telephone visit in roughly 2 weeks time to assess progress.     REVIEW OF SYSTEMS:    A 12 point ROS was reviewed and is negative except for what is listed above in HPI.    PHH:    Past Medical History:   Diagnosis Date    Depressive disorder, not elsewhere classified     Herpes zoster without mention of complication     Hypercalcemia 2/24/2007    Mild intermittent asthma     Other urinary incontinence     Type II or unspecified type diabetes mellitus with renal manifestations, uncontrolled(250.42) (H)     Type II or unspecified type diabetes mellitus without mention of complication, not stated as uncontrolled     Unspecified essential hypertension           Past Surgical History:   Procedure Laterality Date    AMPUTATE LEG BELOW KNEE Left 10/7/2023    Procedure: LEFT GUILLOTINE BELOW KNEE AMPUTATION.;  Surgeon: Lan Otto MD;  Location: [facility] OR    AMPUTATE LEG BELOW KNEE Left  10/14/2023    Procedure: debridement of left below the knee amputation;  Surgeon: Lan Otto MD;  Location: [facility] OR    APPLY WOUND VAC Left 1/2/2024    Procedure: WOUND VAC APPLICATION TO LEFT BELOW KNEE STUMP;  Surgeon: Lan Otto MD;  Location: [facility] OR    CHOLECYSTECTOMY      GRAFT SKIN SPLIT THICKNESS FROM TRUNK TO HEAD Left 1/2/2024    Procedure: APPLICATION OF SPLIT-THICKNESS SKIN GRAFT TO LEFT BELOW KNEE STUMP, GRAFT FROM LEFT THIGH;  Surgeon: Lan Otto MD;  Location: [facility] OR    INCISION AND DRAINAGE FOOT, COMBINED Left 9/26/2023    Procedure: Surgical debridement of all nonviable skin and soft tissue and bone left foot;  Surgeon: Nolberto Thorne DPM;  Location: [facility]    IR LOWER EXTREMITY ANGIOGRAM LEFT  10/3/2023    ligation of fallopian tube      OPEN REDUCTION INTERNAL FIXATION ANKLE  10/13/11    left    pinning of left 2nd toe         ALLERGIES:  Prednisone, Morphine, and Morphine [fumaric acid]    MEDS:    Current Outpatient Medications:     aspirin 81 MG EC tablet, Take 1 tablet (81 mg) by mouth daily, Disp: , Rfl:     atorvastatin (LIPITOR) 40 MG tablet, Take 1 tablet (40 mg) by mouth every evening, Disp: , Rfl:     diphenhydrAMINE (BENADRYL) 25 MG tablet, Take 1 tablet (25 mg) by mouth every 6 hours as needed for itching or allergies, Disp: 60 tablet, Rfl: 3    hydrocortisone 2.5 % cream, Apply topically 2 times daily as needed, Disp: , Rfl:     insulin aspart (NOVOLOG PEN) 100 UNIT/ML pen, Inject 1-7 Units Subcutaneous 3 times daily (before meals), Disp: 15 mL, Rfl: 0    insulin aspart (NOVOLOG PEN) 100 UNIT/ML pen, Inject 1-5 Units Subcutaneous at bedtime, Disp: 15 mL, Rfl: 0    methadone (DOLOPHINE) 10 MG tablet, Take 1 tablet (10 mg) by mouth daily Daily at 1500, Disp: 30 tablet, Rfl: 0    methadone (DOLOPHINE) 5 MG tablet, Take 1 tablet (5 mg) by mouth 3 times daily, Disp: 90 tablet, Rfl: 0    miconazole (MICATIN) 2 % external powder, Apply topically 2 times  daily Apply to buttock and fungal areas BID and BID PRN, Disp: 85 g, Rfl: 11    multivitamin w/minerals (THERA-VIT-M) tablet, Take 1 tablet by mouth daily, Disp: , Rfl:     mupirocin (BACTROBAN) 2 % external ointment, Apply topically 2 times daily Apply to RLE lesion 3x weekly with lymphedema changes, Disp: , Rfl:     Warfarin Therapy Reminder, Per INR, Disp: , Rfl:     acetaminophen (TYLENOL) 325 MG tablet, Take 2 tablets (650 mg) by mouth every 6 hours as needed for mild pain or other (and adjunct with moderate or severe pain or per patient request), Disp: , Rfl:     bisacodyl (DULCOLAX) 10 MG suppository, Place 1 suppository (10 mg) rectally daily as needed for constipation, Disp: , Rfl:     insulin degludec (TRESIBA) 200 UNIT/ML pen, Inject 18 Units Subcutaneous at bedtime Hold for blood glucose less than 100, Disp: 15 mL, Rfl: 1    loperamide (IMODIUM A-D) 2 MG tablet, Take 1 tablet (2 mg) by mouth 4 times daily as needed for diarrhea, Disp: , Rfl:     ondansetron (ZOFRAN ODT) 4 MG ODT tab, Take 1 tablet (4 mg) by mouth every 6 hours as needed for nausea or vomiting, Disp: , Rfl:     polyethylene glycol (MIRALAX) 17 GM/Dose powder, Take 17 g by mouth 2 times daily as needed for constipation, Disp: , Rfl:     senna-docusate (SENOKOT-S/PERICOLACE) 8.6-50 MG tablet, Take 2 tablets by mouth 2 times daily as needed for constipation, Disp: , Rfl:     torsemide (DEMADEX) 10 MG tablet, Take 3 tablets (30 mg) by mouth daily HOLD if SBP<100, Disp: 60 tablet, Rfl: 11    SOCIAL HABITS:    History   Smoking Status    Never   Smokeless Tobacco    Never     Social History    Substance and Sexual Activity      Alcohol use: No      History   Drug Use No       FAMILY HISTORY:    Family History   Problem Relation Age of Onset    Hypertension Mother     Heart Disease Father         heart attack and cardiomegaly    Diabetes Sister         type 2    Hypertension Sister     Respiratory Sister     Psychotic Disorder Sister          bipolar    Cancer Maternal Grandmother         throat    Cancer Paternal Grandmother         gastric       PE:  /77 (BP Location: Left arm, Patient Position: Chair, Cuff Size: Adult Regular)   Pulse 71   SpO2 97%   Wt Readings from Last 1 Encounters:   01/08/24 233 lb 6.4 oz (105.9 kg)     There is no height or weight on file to calculate BMI.    EXAM:  GENERAL: well-developed 78 year old female who appears her stated age  CARDIAC: normal   CHEST/LUNG: normal respiratory effort   MUSCULOSKELETAL: grossly normal and both lower extremities are intact, no lower extremity edema  NEUROLOGIC: focally intact, alert and oriented x 3  PSYCH: appropriate affect  VASCULAR:     DIAGNOSTIC STUDIES:     Images:  No results found.    LABS:      Sodium   Date Value Ref Range Status   01/02/2024 136 135 - 145 mmol/L Final     Comment:     Reference intervals for this test were updated on 09/26/2023 to more accurately reflect our healthy population. There may be differences in the flagging of prior results with similar values performed with this method. Interpretation of those prior results can be made in the context of the updated reference intervals.    12/27/2023 137 135 - 145 mmol/L Final     Comment:     Reference intervals for this test were updated on 09/26/2023 to more accurately reflect our healthy population. There may be differences in the flagging of prior results with similar values performed with this method. Interpretation of those prior results can be made in the context of the updated reference intervals.    12/08/2023 137 135 - 145 mmol/L Final     Comment:     Reference intervals for this test were updated on 09/26/2023 to more accurately reflect our healthy population. There may be differences in the flagging of prior results with similar values performed with this method. Interpretation of those prior results can be made in the context of the updated reference intervals.    02/22/2008 140 133 - 144 mmol/L  Final   10/15/2007 141 133 - 144 mmol/L Final   09/18/2007 142 133 - 144 mmol/L Final     Urea Nitrogen   Date Value Ref Range Status   01/02/2024 37.1 (H) 8.0 - 23.0 mg/dL Final   12/27/2023 40.4 (H) 8.0 - 23.0 mg/dL Final   12/08/2023 32.4 (H) 8.0 - 23.0 mg/dL Final   06/28/2022 42 (H) 8 - 28 mg/dL Final   02/22/2008 15 7 - 30 mg/dL Final   10/15/2007 17 7 - 30 mg/dL Final   09/18/2007 14 7 - 30 mg/dL Final     Hemoglobin   Date Value Ref Range Status   12/27/2023 10.5 (L) 11.7 - 15.7 g/dL Final   12/08/2023 9.6 (L) 11.7 - 15.7 g/dL Final   11/24/2023 9.9 (L) 11.7 - 15.7 g/dL Final   02/22/2008 12.6 11.7 - 15.7 g/dL Final   09/18/2007 14.3 11.7 - 15.7 g/dL Final   06/16/2006 13.1 11.7 - 15.7 g/dL Final     Platelet Count   Date Value Ref Range Status   12/27/2023 199 150 - 450 10e3/uL Final   12/08/2023 198 150 - 450 10e3/uL Final   11/24/2023 228 150 - 450 10e3/uL Final   02/22/2008 177 150 - 450 10e9/L Final   09/18/2007 220 150 - 450 10e9/L Final   06/16/2006 207 150 - 450 10e9/L Final     BNP   Date Value Ref Range Status   02/24/2006 21 5 - 100 pg/mL Final   12/07/2005 28 5 - 100 pg/mL Final     INR   Date Value Ref Range Status   01/02/2024 1.47 (H) 0.85 - 1.15 Final   12/08/2023 2.73 (H) 0.85 - 1.15 Final   11/24/2023 3.50 (H) 0.85 - 1.15 Final   06/16/2006 0.98 0.86 - 1.14 Final       30 minutes spent on the day of encounter doing chart review, history and exam, documentation, and further activities as noted.     Vanita Carter NP  VASCULAR SURGERY

## 2024-01-09 NOTE — PROGRESS NOTES
[facility]  January 10, 2024      CHIEF COMPLAINT:  Episodic/follow-up visit    HPI:    Linda Loredo is a 78 year old  (1945), who is being seen today for an episodic care visit at [facility].     Medical history is notable for DM type II, diabetic neuropathy, left heel diabetic ulcer, hypertension, dyslipidemia, PAD, atrial fibrillation, CKD, COPD, asthma, MRSA infection, UTI, herpes zoster, primary hyperparathyroidism, umbilical hernia, chronic pain, depression, anxiety, osteopenia, severe obesity, and BERLIN.    Summary of hospital course:  Patient was initially hospitalized at [facility] from September 29 through October 24, 2023 for left heel diabetic ulcer and left calcaneal osteomyelitis for which she underwent left BKA on October 7, 2023 and sharp excisional debridement and tibial/fibular resection on October 14, 2023.  Tissue culture was polymicrobial.  She was treated with vancomycin and meropenem inpatient.  Hospital course was complicated by ABLA with a ralf hemoglobin 6.6 for which she received 1 unit of PRBC on October 10.  She was discharged to [facility] ([facility] where she stayed from October 24 through November 15, 2023. She was then admitted to this facility for ongoing medical management, nursing care, and rehab.       Today's visit:  Patient was seen today in her room, while sitting in a wheelchair.  She on January 2, 2024 underwent harvest of left thigh split-thickness skin graft, application of KCI wound VAC to skin graft site, and left stump skin grafting by Dr. Otto of vascular surgery.  She does report some pain at the graft harvest in her thigh.  She denies fever, chills, chest pain, palpitation, dyspnea, nausea, vomiting, abdominal pain, or urinary symptoms.  She had a bowel movement yesterday.    CODE STATUS:   CPR/Full code     PAST MEDICAL HISTORY:   Left heel diabetic ulcer and left calcaneal chronic  osteomyelitis, s/p left guillotine below-knee amputation on October 7, 2023 and sharp excisional debridement and tibial/fibular resection October 14, 2023, and left BKA stump skin grafting on January 2, 2023  Lower extremity venous stasis  DM type II  Diabetic polyneuropathy  Hypertension  Dyslipidemia  PAD (diffuse atherosclerotic disease per lower extremity arterial Doppler in September 2023)  Atrial fibrillation  CKD stage II-IIIa, baseline creatinine 0.9-1.1  COPD  Mild intermittent asthma  MRSA nares  UTI  Herpes zoster  Primary hyperparathyroidism  Umbilical hernia  Chronic pain  Depression  Anxiety  Osteopenia  Severe obesity, BMI 37  BERLIN, noncompliant with CPAP for several years    Past Surgical, Family, and Social History were reviewed in [facility].    Current Outpatient Medications   Medication Sig Dispense Refill     acetaminophen (TYLENOL) 325 MG tablet Take 2 tablets (650 mg) by mouth every 6 hours as needed for mild pain or other (and adjunct with moderate or severe pain or per patient request)       aspirin 81 MG EC tablet Take 1 tablet (81 mg) by mouth daily       atorvastatin (LIPITOR) 40 MG tablet Take 1 tablet (40 mg) by mouth every evening       bisacodyl (DULCOLAX) 10 MG suppository Place 1 suppository (10 mg) rectally daily as needed for constipation       diphenhydrAMINE (BENADRYL) 25 MG tablet Take 1 tablet (25 mg) by mouth every 6 hours as needed for itching or allergies 60 tablet 3     hydrocortisone 2.5 % cream Apply topically 2 times daily as needed       insulin aspart (NOVOLOG PEN) 100 UNIT/ML pen Inject 1-7 Units Subcutaneous 3 times daily (before meals) 15 mL 0     insulin aspart (NOVOLOG PEN) 100 UNIT/ML pen Inject 1-5 Units Subcutaneous at bedtime 15 mL 0     insulin degludec (TRESIBA) 200 UNIT/ML pen Inject 18 Units Subcutaneous at bedtime Hold for blood glucose less than 100 15 mL 1     loperamide (IMODIUM A-D) 2 MG tablet Take 1 tablet (2 mg) by mouth 4 times daily as needed for  "diarrhea       methadone (DOLOPHINE) 10 MG tablet Take 1 tablet (10 mg) by mouth daily Daily at 1500 30 tablet 0     methadone (DOLOPHINE) 5 MG tablet Take 1 tablet (5 mg) by mouth 3 times daily 90 tablet 0     miconazole (MICATIN) 2 % external powder Apply topically 2 times daily Apply to buttock and fungal areas BID and BID PRN 85 g 11     multivitamin w/minerals (THERA-VIT-M) tablet Take 1 tablet by mouth daily       mupirocin (BACTROBAN) 2 % external ointment Apply topically 2 times daily Apply to RLE lesion 3x weekly with lymphedema changes       ondansetron (ZOFRAN ODT) 4 MG ODT tab Take 1 tablet (4 mg) by mouth every 6 hours as needed for nausea or vomiting       polyethylene glycol (MIRALAX) 17 GM/Dose powder Take 17 g by mouth 2 times daily as needed for constipation       senna-docusate (SENOKOT-S/PERICOLACE) 8.6-50 MG tablet Take 2 tablets by mouth 2 times daily as needed for constipation       torsemide (DEMADEX) 10 MG tablet Take 3 tablets (30 mg) by mouth daily HOLD if SBP<100 60 tablet 11     Warfarin Therapy Reminder Per INR         MED REC REQUIRED  Post Medication Reconciliation Status: medication reconcilation previously completed during another office visit      ALLERGIES: Prednisone, Morphine, and Morphine [fumaric acid]    REVIEW OF SYSTEMS:  10 point ROS were negative other than the symptoms noted above in the HPI.    PHYSICAL EXAM:  Vital signs were reviewed in the chart.  Vital Signs:  BP (!) 142/83   Pulse 93   Temp 97.7  F (36.5  C)   Resp 16   Ht 1.676 m (5' 6\")   Wt 106.4 kg (234 lb 9.6 oz)   SpO2 100%   BMI 37.87 kg/m    General: Comfortable and in no acute distress  HEENT: Conjunctival pallor, no scleral icterus or injection, moist oral mucosa  Cardiovascular: Normal S1, S2, irregularly irregular rhythm  Respiratory: Lungs clear to auscultation bilaterally  GI: Abdomen soft, non-tender, non-distended, +BS  Extremities: Left BKA stump graft looks good, she has 2+ right lower " extremity edema  Neuro: CX II-XII grossly intact; ROM in all four extremities grossly intact  Psych: Alert and oriented x3; normal affect  Skin: No significant erythema, right leg wounds are healing well    LABORATORY/IMAGING DATA:  All relevant labs and imaging data in [facility] and/or Care Everywhere were personally reviewed today.      Most Recent 3 CBC's:Recent Labs   Lab Test 12/27/23  0958 12/08/23  0600 11/24/23  1012   WBC 8.0 7.5 7.0   HGB 10.5* 9.6* 9.9*   MCV 90 91 94    198 228     Most Recent 3 BMP's:Recent Labs   Lab Test 01/02/24  1225 01/02/24  1136 01/02/24  0804 12/27/23  0958 12/08/23  1226   NA  --   --  136 137 137   POTASSIUM  --   --  4.1 4.5 4.6   CHLORIDE  --   --  94* 96* 98   CO2  --   --  34* 32* 30*   BUN  --   --  37.1* 40.4* 32.4*   CR  --   --  0.87 0.95 0.96*   ANIONGAP  --   --  8 9 9   ZAKIA  --   --  11.0* 10.6* 10.1   * 226* 232* 261* 185*     Most Recent 2 LFT's:Recent Labs   Lab Test 11/17/23  1033 11/01/23  0613   AST 20 19   ALT 11 12   ALKPHOS 90 97   BILITOTAL 0.5 0.2       ASSESSMENT/PLAN:  Left heel diabetic ulcer and left calcaneal chronic osteomyelitis, s/p left guillotine below-knee amputation on October 7, 2023 and sharp excisional debridement and tibial/fibular resection October 14, 2023,  S/P left stump skin grafting on January 2, 2024.  Culture polymicrobial.  Treated with meropenem and vancomycin inpatient.  Underwent harvest of left thigh split-thickness skin graft and skin grafting on January 2, 2024.  Wound VAC was removed on January 8 at the vascular clinic.  Patient reports mild-moderate pain at the graft harvest.  Plan:  Continue wound care per instructions  Continue pain management with methadone and PRN acetaminophen as ordered  Follow-up with vascular surgery as directed     Lower extremity venous stasis,  RLE cellulitis,  Traumatic right calf wound (due to hitting the bed in TCU).  Echocardiogram in May 2022 with LVEF 69%, mild mitral and  tricuspid valve regurgitation, elevated RV systolic pressure of 47.6 mmHg, and normal RV systolic function.  Patient received 10-day course of cephalexin followed by 10-day course of clindamycin.  Cellulitis has resolved and wound is healing well.  Plan:  Continue torsemide 30 mg daily  Continue lymphedema therapy  Continue wound care/dressings per orders  Monitor     ABLA.  Patient was transfused with 1 unit of PRBC on October 10 for hemoglobin 6.6.  Anemia is improving  Last hemoglobin 10.5 on December 27.  Plan:  Monitor hemoglobin periodically    Hypertension.    Blood pressure is fairly controlled  Plan:  Continue torsemide 30 mg daily  Monitor blood pressure    DM type II,  Diabetic polyneuropathy.  Metformin and Ozempic were discontinued in LTACH.  Last hemoglobin A1c 8.7% on January 2, 2024.  Insulin degludec dose was recently increased in TCU.  Blood glucose levels are in range of 250-350.   Plan:  Continue insulin degludec 18 units subcu at bedtime  Continue sliding scale NovoLog  Monitor blood glucose before meals and at bedtime and further adjust diabetic regimen as indicated     Dyslipidemia,  PAD (diffuse atherosclerotic disease per lower extremity arterial Doppler in September 2023).  Plan:  Continue aspirin 81 mg daily  Continue atorvastatin 40 mg daily     Atrial fibrillation.  Now persistent.  Not on rate control medications.  Currently on warfarin 2 mg every Monday, Wednesday and Friday, and 2.5 mg every Tuesday, Thursday, Saturday, and Sunday.  Rate is controlled.  Last INR 1.47 on January 2.   Plan:  Continue anticoagulation with warfarin  Adjust warfarin dose for INR target 2-3 (next INR on January 15)  Monitor heart rate     CKD stage II-IIIa.  Baseline creatinine 0.9-1.1  Last creatinine 0.87 January 2..  Plan:  Avoid NSAIDs and nephrotoxins  Monitor renal function periodically     Primary hyperparathyroidism.  Last calcium level 11 on January 2.  Plan  Monitor calcium level  periodically  Follow-up as outpatient     COPD,  Mild intermittent asthma.  Not on inhalers.  Stable from a respiratory standpoint.  Plan:,   Monitor respiratory status     Chronic pain,  Opiate dependence.  Plan:  Continue methadone 5 mg 3 times daily and 10 mg at 3 PM as ordered     Pressure injury of left buttock.  Acute.  Plan:  Continue wound care per instructions  Monitor skin for further breakdown     Slow transit constipation.  Plan:  Continue the bowel regimen     Severe obesity, BMI 37,  BERLIN.  Noncompliant with CPAP for several years.  Plan:  Monitor O2 sats     Physical deconditioning.  Plan:  Continue PT/OT evaluation and therapy         Orders written by provider at facility:  None.            Disclaimer: This note contains text created using speech-recognition software and may have unintended word substitutions.      Electronically signed by  Gatito Escobar MD

## 2024-01-10 ENCOUNTER — TELEPHONE (OUTPATIENT)
Dept: OTHER | Facility: CLINIC | Age: 79
End: 2024-01-10

## 2024-01-10 ENCOUNTER — TRANSITIONAL CARE UNIT VISIT (OUTPATIENT)
Dept: GERIATRICS | Facility: CLINIC | Age: 79
End: 2024-01-10
Payer: COMMERCIAL

## 2024-01-10 VITALS
OXYGEN SATURATION: 100 % | SYSTOLIC BLOOD PRESSURE: 142 MMHG | HEART RATE: 93 BPM | DIASTOLIC BLOOD PRESSURE: 83 MMHG | RESPIRATION RATE: 16 BRPM | TEMPERATURE: 97.7 F | BODY MASS INDEX: 37.7 KG/M2 | HEIGHT: 66 IN | WEIGHT: 234.6 LBS

## 2024-01-10 DIAGNOSIS — J45.20 MILD INTERMITTENT ASTHMA WITHOUT COMPLICATION: ICD-10-CM

## 2024-01-10 DIAGNOSIS — L97.424 DIABETIC ULCER OF LEFT HEEL ASSOCIATED WITH TYPE 2 DIABETES MELLITUS, WITH NECROSIS OF BONE (H): Primary | ICD-10-CM

## 2024-01-10 DIAGNOSIS — J44.9 CHRONIC OBSTRUCTIVE PULMONARY DISEASE, UNSPECIFIED COPD TYPE (H): ICD-10-CM

## 2024-01-10 DIAGNOSIS — Z89.512 STATUS POST BELOW-KNEE AMPUTATION OF LEFT LOWER EXTREMITY (H): ICD-10-CM

## 2024-01-10 DIAGNOSIS — D62 ACUTE BLOOD LOSS ANEMIA: ICD-10-CM

## 2024-01-10 DIAGNOSIS — E11.42 TYPE 2 DIABETES MELLITUS WITH DIABETIC POLYNEUROPATHY, WITH LONG-TERM CURRENT USE OF INSULIN (H): ICD-10-CM

## 2024-01-10 DIAGNOSIS — I48.19 PERSISTENT ATRIAL FIBRILLATION (H): ICD-10-CM

## 2024-01-10 DIAGNOSIS — G89.29 OTHER CHRONIC PAIN: ICD-10-CM

## 2024-01-10 DIAGNOSIS — R53.81 PHYSICAL DECONDITIONING: ICD-10-CM

## 2024-01-10 DIAGNOSIS — N18.31 STAGE 3A CHRONIC KIDNEY DISEASE (H): ICD-10-CM

## 2024-01-10 DIAGNOSIS — G47.33 OSA (OBSTRUCTIVE SLEEP APNEA): ICD-10-CM

## 2024-01-10 DIAGNOSIS — M86.672 OTHER CHRONIC OSTEOMYELITIS OF LEFT FOOT (H): ICD-10-CM

## 2024-01-10 DIAGNOSIS — E78.5 DYSLIPIDEMIA: ICD-10-CM

## 2024-01-10 DIAGNOSIS — E11.621 DIABETIC ULCER OF LEFT HEEL ASSOCIATED WITH TYPE 2 DIABETES MELLITUS, WITH NECROSIS OF BONE (H): Primary | ICD-10-CM

## 2024-01-10 DIAGNOSIS — E21.0 PRIMARY HYPERPARATHYROIDISM (H): ICD-10-CM

## 2024-01-10 DIAGNOSIS — I73.9 PAD (PERIPHERAL ARTERY DISEASE) (H): ICD-10-CM

## 2024-01-10 DIAGNOSIS — Z79.4 TYPE 2 DIABETES MELLITUS WITH DIABETIC POLYNEUROPATHY, WITH LONG-TERM CURRENT USE OF INSULIN (H): ICD-10-CM

## 2024-01-10 DIAGNOSIS — I87.8 VENOUS STASIS: ICD-10-CM

## 2024-01-10 DIAGNOSIS — K59.01 SLOW TRANSIT CONSTIPATION: ICD-10-CM

## 2024-01-10 DIAGNOSIS — E66.01 SEVERE OBESITY (BMI 35.0-39.9) WITH COMORBIDITY (H): ICD-10-CM

## 2024-01-10 DIAGNOSIS — I10 ESSENTIAL HYPERTENSION: ICD-10-CM

## 2024-01-10 PROCEDURE — 99309 SBSQ NF CARE MODERATE MDM 30: CPT | Performed by: INTERNAL MEDICINE

## 2024-01-10 NOTE — LETTER
1/10/2024        RE: Linda Loredo  [address] [facility] [id number]        [place] [facility]  January 10, 2024      CHIEF COMPLAINT:  Episodic/follow-up visit    HPI:    Linda Loredo is a 78 year old  (1945), who is being seen today for an episodic care visit at [facility].     Medical history is notable for DM type II, diabetic neuropathy, left heel diabetic ulcer, hypertension, dyslipidemia, PAD, atrial fibrillation, CKD, COPD, asthma, MRSA infection, UTI, herpes zoster, primary hyperparathyroidism, umbilical hernia, chronic pain, depression, anxiety, osteopenia, severe obesity, and BERLIN.    Summary of hospital course:  Patient was initially hospitalized at [facility] from September 29 through October 24, 2023 for left heel diabetic ulcer and left calcaneal osteomyelitis for which she underwent left BKA on October 7, 2023 and sharp excisional debridement and tibial/fibular resection on October 14, 2023.  Tissue culture was polymicrobial.  She was treated with vancomycin and meropenem inpatient.  Hospital course was complicated by ABLA with a ralf hemoglobin 6.6 for which she received 1 unit of PRBC on October 10.  She was discharged to [facility] ([facility]) where she stayed from October 24 through November 15, 2023. She was then admitted to this facility for ongoing medical management, nursing care, and rehab.       Today's visit:  Patient was seen today in her room, while sitting in a wheelchair.  She on January 2, 2024 underwent harvest of left thigh split-thickness skin graft, application of KCI wound VAC to skin graft site, and left stump skin grafting by Dr. Otto of vascular surgery.  She does report some pain at the graft harvest in her thigh.  She denies fever, chills, chest pain, palpitation, dyspnea, nausea, vomiting, abdominal pain, or urinary symptoms.  She had a bowel movement yesterday.    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, 2023 and sharp excisional debridement and tibial/fibular resection October 14, 2023, and left BKA stump skin grafting on January 2, 2023  Lower extremity venous stasis  DM type II  Diabetic polyneuropathy  Hypertension  Dyslipidemia  PAD (diffuse atherosclerotic disease per lower extremity arterial Doppler in September 2023)  Atrial fibrillation  CKD stage II-IIIa, baseline creatinine 0.9-1.1  COPD  Mild intermittent asthma  MRSA nares  UTI  Herpes zoster  Primary hyperparathyroidism  Umbilical hernia  Chronic pain  Depression  Anxiety  Osteopenia  Severe obesity, BMI 37  BERLIN, noncompliant with CPAP for several years    Past Surgical, Family, and Social History were reviewed in EPIC.    Current Outpatient Medications   Medication Sig Dispense Refill     acetaminophen (TYLENOL) 325 MG tablet Take 2 tablets (650 mg) by mouth every 6 hours as needed for mild pain or other (and adjunct with moderate or severe pain or per patient request)       aspirin 81 MG EC tablet Take 1 tablet (81 mg) by mouth daily       atorvastatin (LIPITOR) 40 MG tablet Take 1 tablet (40 mg) by mouth every evening       bisacodyl (DULCOLAX) 10 MG suppository Place 1 suppository (10 mg) rectally daily as needed for constipation       diphenhydrAMINE (BENADRYL) 25 MG tablet Take 1 tablet (25 mg) by mouth every 6 hours as needed for itching or allergies 60 tablet 3     hydrocortisone 2.5 % cream Apply topically 2 times daily as needed       insulin aspart (NOVOLOG PEN) 100 UNIT/ML pen Inject 1-7 Units Subcutaneous 3 times daily (before meals) 15 mL 0     insulin aspart (NOVOLOG PEN) 100 UNIT/ML pen Inject 1-5 Units Subcutaneous at bedtime 15 mL 0     insulin degludec (TRESIBA) 200 UNIT/ML pen Inject 18 Units Subcutaneous at bedtime Hold for blood glucose less than 100 15 mL 1      "loperamide (IMODIUM A-D) 2 MG tablet Take 1 tablet (2 mg) by mouth 4 times daily as needed for diarrhea       methadone (DOLOPHINE) 10 MG tablet Take 1 tablet (10 mg) by mouth daily Daily at 1500 30 tablet 0     methadone (DOLOPHINE) 5 MG tablet Take 1 tablet (5 mg) by mouth 3 times daily 90 tablet 0     miconazole (MICATIN) 2 % external powder Apply topically 2 times daily Apply to buttock and fungal areas BID and BID PRN 85 g 11     multivitamin w/minerals (THERA-VIT-M) tablet Take 1 tablet by mouth daily       mupirocin (BACTROBAN) 2 % external ointment Apply topically 2 times daily Apply to RLE lesion 3x weekly with lymphedema changes       ondansetron (ZOFRAN ODT) 4 MG ODT tab Take 1 tablet (4 mg) by mouth every 6 hours as needed for nausea or vomiting       polyethylene glycol (MIRALAX) 17 GM/Dose powder Take 17 g by mouth 2 times daily as needed for constipation       senna-docusate (SENOKOT-S/PERICOLACE) 8.6-50 MG tablet Take 2 tablets by mouth 2 times daily as needed for constipation       torsemide (DEMADEX) 10 MG tablet Take 3 tablets (30 mg) by mouth daily HOLD if SBP<100 60 tablet 11     Warfarin Therapy Reminder Per INR         MED REC REQUIRED  Post Medication Reconciliation Status: medication reconcilation previously completed during another office visit      ALLERGIES: Prednisone, Morphine, and Morphine [fumaric acid]    REVIEW OF SYSTEMS:  10 point ROS were negative other than the symptoms noted above in the HPI.    PHYSICAL EXAM:  Vital signs were reviewed in the chart.  Vital Signs:  BP (!) 142/83   Pulse 93   Temp 97.7  F (36.5  C)   Resp 16   Ht 1.676 m (5' 6\")   Wt 106.4 kg (234 lb 9.6 oz)   SpO2 100%   BMI 37.87 kg/m    General: Comfortable and in no acute distress  HEENT: Conjunctival pallor, no scleral icterus or injection, moist oral mucosa  Cardiovascular: Normal S1, S2, irregularly irregular rhythm  Respiratory: Lungs clear to auscultation bilaterally  GI: Abdomen soft, " non-tender, non-distended, +BS  Extremities: Left BKA stump graft looks good, she has 2+ right lower extremity edema  Neuro: CX II-XII grossly intact; ROM in all four extremities grossly intact  Psych: Alert and oriented x3; normal affect  Skin: No significant erythema, right leg wounds are healing well    LABORATORY/IMAGING DATA:  All relevant labs and imaging data in [facility] and/or Care Everywhere were personally reviewed today.      Most Recent 3 CBC's:Recent Labs   Lab Test 12/27/23  0958 12/08/23  0600 11/24/23  1012   WBC 8.0 7.5 7.0   HGB 10.5* 9.6* 9.9*   MCV 90 91 94    198 228     Most Recent 3 BMP's:Recent Labs   Lab Test 01/02/24  1225 01/02/24  1136 01/02/24  0804 12/27/23  0958 12/08/23  1226   NA  --   --  136 137 137   POTASSIUM  --   --  4.1 4.5 4.6   CHLORIDE  --   --  94* 96* 98   CO2  --   --  34* 32* 30*   BUN  --   --  37.1* 40.4* 32.4*   CR  --   --  0.87 0.95 0.96*   ANIONGAP  --   --  8 9 9   ZAKIA  --   --  11.0* 10.6* 10.1   * 226* 232* 261* 185*     Most Recent 2 LFT's:Recent Labs   Lab Test 11/17/23  1033 11/01/23  0613   AST 20 19   ALT 11 12   ALKPHOS 90 97   BILITOTAL 0.5 0.2       ASSESSMENT/PLAN:  Left heel diabetic ulcer and left calcaneal chronic osteomyelitis, s/p left guillotine below-knee amputation on October 7, 2023 and sharp excisional debridement and tibial/fibular resection October 14, 2023,  S/P left stump skin grafting on January 2, 2024.  Culture polymicrobial.  Treated with meropenem and vancomycin inpatient.  Underwent harvest of left thigh split-thickness skin graft and skin grafting on January 2, 2024.  Wound VAC was removed on January 8 at the vascular clinic.  Patient reports mild-moderate pain at the graft harvest.  Plan:  Continue wound care per instructions  Continue pain management with methadone and PRN acetaminophen as ordered  Follow-up with vascular surgery as directed     Lower extremity venous stasis,  RLE cellulitis,  Traumatic right calf wound  (due to hitting the bed in TCU).  Echocardiogram in May 2022 with LVEF 69%, mild mitral and tricuspid valve regurgitation, elevated RV systolic pressure of 47.6 mmHg, and normal RV systolic function.  Patient received 10-day course of cephalexin followed by 10-day course of clindamycin.  Cellulitis has resolved and wound is healing well.  Plan:  Continue torsemide 30 mg daily  Continue lymphedema therapy  Continue wound care/dressings per orders  Monitor     ABLA.  Patient was transfused with 1 unit of PRBC on October 10 for hemoglobin 6.6.  Anemia is improving  Last hemoglobin 10.5 on December 27.  Plan:  Monitor hemoglobin periodically    Hypertension.    Blood pressure is fairly controlled  Plan:  Continue torsemide 30 mg daily  Monitor blood pressure    DM type II,  Diabetic polyneuropathy.  Metformin and Ozempic were discontinued in LTACH.  Last hemoglobin A1c 8.7% on January 2, 2024.  Insulin degludec dose was recently increased in TCU.  Blood glucose levels are in range of 250-350.   Plan:  Continue insulin degludec 18 units subcu at bedtime  Continue sliding scale NovoLog  Monitor blood glucose before meals and at bedtime and further adjust diabetic regimen as indicated     Dyslipidemia,  PAD (diffuse atherosclerotic disease per lower extremity arterial Doppler in September 2023).  Plan:  Continue aspirin 81 mg daily  Continue atorvastatin 40 mg daily     Atrial fibrillation.  Now persistent.  Not on rate control medications.  Currently on warfarin 2 mg every Monday, Wednesday and Friday, and 2.5 mg every Tuesday, Thursday, Saturday, and Sunday.  Rate is controlled.  Last INR 1.47 on January 2.   Plan:  Continue anticoagulation with warfarin  Adjust warfarin dose for INR target 2-3 (next INR on January 15)  Monitor heart rate     CKD stage II-IIIa.  Baseline creatinine 0.9-1.1  Last creatinine 0.87 January 2..  Plan:  Avoid NSAIDs and nephrotoxins  Monitor renal function periodically     Primary  hyperparathyroidism.  Last calcium level 11 on January 2.  Plan  Monitor calcium level periodically  Follow-up as outpatient     COPD,  Mild intermittent asthma.  Not on inhalers.  Stable from a respiratory standpoint.  Plan:,   Monitor respiratory status     Chronic pain,  Opiate dependence.  Plan:  Continue methadone 5 mg 3 times daily and 10 mg at 3 PM as ordered     Pressure injury of left buttock.  Acute.  Plan:  Continue wound care per instructions  Monitor skin for further breakdown     Slow transit constipation.  Plan:  Continue the bowel regimen     Severe obesity, BMI 37,  BERLIN.  Noncompliant with CPAP for several years.  Plan:  Monitor O2 sats     Physical deconditioning.  Plan:  Continue PT/OT evaluation and therapy         Orders written by provider at facility:  None.            Disclaimer: This note contains text created using speech-recognition software and may have unintended word substitutions.      Electronically signed by  Gatito Escobar MD                        Sincerely,        Gatito Escobar MD

## 2024-01-10 NOTE — TELEPHONE ENCOUNTER
[facility]    Who is the name of the provider?:  MANAN QUINN   What is the location you see this provider at/preferred location?: [place]  Person calling / Facility: Al ([employer] )   Phone number:  [phone]  Nurse call back needed:  ?     Reason for call:     (formerly [facility]) needing History& Physical, Operative report, Wound Assessment with measurements (closest to 1/2/24)    Fax# [phone]    Pharmacy location:     [id number] - [place], [place] - [address] [facility]  [facility] [address] [place] [facility] - [place], [place] - [address] [place] [address] [address] - [place], [place] - [address] [facility] - [place], [place] - [address] [facility] - [place], [place] - [address] [facility]  Outside Imaging: n/a   Can we leave a detailed message on this number?  YES     1/10/2024, 3:49 PM

## 2024-01-11 NOTE — TELEPHONE ENCOUNTER
Al calling to find out the status of the request for documents.    Informed him of the information in today's encounter of the date/time faxed.

## 2024-01-11 NOTE — TELEPHONE ENCOUNTER
Requested documents faxed to [facility] at 5-.  Confirmed via RightFax on 1/11/24 at 0841.    Maegan Pickard, JOHANN, RN, [facility]

## 2024-01-14 NOTE — PROGRESS NOTES
[facility]    Chief Complaint   Patient presents with    RECHECK     HPI:  Linda Loredo is a 78 year old  (1945), who is being seen today for an episodic care visit at: [facility] ([facility]) [[id number]]. Today's concern is: The primary encounter diagnosis was Other chronic osteomyelitis of left foot (H). Diagnoses of Diabetic ulcer of left heel associated with type 2 diabetes mellitus, with necrosis of bone (H), 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), Acute blood loss anemia, Venous stasis, Essential hypertension, Type 2 diabetes mellitus with diabetic polyneuropathy, with long-term current use of insulin (H), Dyslipidemia, PAD (peripheral artery disease) (H24), Persistent atrial fibrillation (H), Stage 3a chronic kidney disease (H), Primary hyperparathyroidism (H24), Chronic obstructive pulmonary disease, unspecified COPD type (H), Mild intermittent asthma without complication, Other chronic pain, Slow transit constipation, Severe obesity (BMI 35.0-39.9) with comorbidity (H), BERLIN (obstructive sleep apnea), Physical deconditioning, Pain, Paroxysmal atrial fibrillation (H), Warfarin-induced coagulopathy  (H24), Long term (current) use of anticoagulants, Opioid dependence, uncomplicated (H), Chronic right-sided heart failure (H), Severe obesity (H), Lymphedema, Status post debridement, Redness, Pressure injury of skin, unspecified injury stage, unspecified location, and Drug-induced constipation were also pertinent to this visit.    Met with patient who denies any chest pain, palpitations, shortness of breath, RASHID, lightheadedness, dizziness, or cough. Denies any abdominal discomfort. Denies N&V. Denies B&B concerns. Denies dysuria or frequency. Denies loose or constipation. Appetite good. Sleeping well. Blood Glucose have been trending upward, I suspect it is due to expedite supplementation which is causing increased Blood Glucose  "values. She is open to recommendations below. Open areas to RLE are mostly all scabbed over, but anterior lower portion one continues to weep quite a bit, she is open to recommendations below, only open to limited interventions. Reports pain stable with current regimen.     BP Readings from Last 3 Encounters:   01/15/24 125/71   01/10/24 (!) 142/83   01/08/24 117/77     Wt Readings from Last 5 Encounters:   01/15/24 105 kg (231 lb 6.4 oz)   01/10/24 106.4 kg (234 lb 9.6 oz)   01/08/24 105.9 kg (233 lb 6.4 oz)   01/05/24 108 kg (238 lb)   01/03/24 105.3 kg (232 lb 3.2 oz)     Allergies, and PMH/PSH reviewed in EPIC today.  REVIEW OF SYSTEMS:  4 point ROS including Respiratory, CV, GI and , other than that noted in the HPI,  is negative    Objective:   /71   Pulse 87   Temp 98.1  F (36.7  C)   Resp 18   Ht 1.676 m (5' 6\")   Wt 105 kg (231 lb 6.4 oz)   SpO2 98%   BMI 37.35 kg/m    GENERAL APPEARANCE:  Alert, in no distress, oriented, morbidly obese, cooperative  ENT:  Mouth and posterior oropharynx normal, moist mucous membranes, normal hearing acuity  EYES:  EOM, conjunctivae, lids, pupils and irises normal  NECK:  No adenopathy,masses or thyromegaly  RESP:  respiratory effort and palpation of chest normal, lungs clear to auscultation , no respiratory distress  CV:  Palpation and auscultation of heart done , regular rate and rhythm, no murmur, rub, or gallop, peripheral edema 2+ in RLE, stump present to left. Wound to stump was not observed today.   ABDOMEN:  normal bowel sounds, soft, nontender, no hepatosplenomegaly or other masses, no guarding or rebound  M/S:   Slide board transfers. Wheelchair bound. Staff to assist as directed  SKIN:  Inspection of skin and subcutaneous tissue baseline, see photos  NEURO:   Cranial nerves 2-12 are normal tested and grossly at patient's baseline, no purposeful movement in upper and lower extremities  PSYCH:  oriented X 3, normal insight, judgement and memory, " affect and mood normal                Most Recent 3 CBC's:  Recent Labs   Lab Test 12/27/23  0958 12/08/23  0600 11/24/23  1012   WBC 8.0 7.5 7.0   HGB 10.5* 9.6* 9.9*   MCV 90 91 94    198 228     Most Recent 3 BMP's:  Recent Labs   Lab Test 01/02/24  1225 01/02/24  1136 01/02/24  0804 12/27/23  0958 12/08/23  1226   NA  --   --  136 137 137   POTASSIUM  --   --  4.1 4.5 4.6   CHLORIDE  --   --  94* 96* 98   CO2  --   --  34* 32* 30*   BUN  --   --  37.1* 40.4* 32.4*   CR  --   --  0.87 0.95 0.96*   ANIONGAP  --   --  8 9 9   ZAKIA  --   --  11.0* 10.6* 10.1   * 226* 232* 261* 185*     Most Recent 2 LFT's:  Recent Labs   Lab Test 11/17/23  1033 11/01/23  0613   AST 20 19   ALT 11 12   ALKPHOS 90 97   BILITOTAL 0.5 0.2     Most Recent 3 INR's:  Recent Labs   Lab Test 01/02/24  0857 12/08/23  1226 11/24/23  1012   INR 1.47* 2.73* 3.50*     Most Recent TSH and T4:  Recent Labs   Lab Test 11/24/23  1012   TSH 3.46   T4 1.20     Most Recent Hemoglobin A1c:  Recent Labs   Lab Test 01/02/24  0804   A1C 8.7*     Most Recent Anemia Panel:  Recent Labs   Lab Test 12/27/23  0958   WBC 8.0   HGB 10.5*   HCT 33.5*   MCV 90          ASSESSMENT/PLAN:  (E11.621,  L97.509,  Z79.4) Type 2 diabetes mellitus with foot ulcer, with long-term current use of insulin (H)  (E66.01) Obesity, Class III, BMI 40-49.9 (morbid obesity) (H)  Comment: Chronic. Last A1c 7% in sept 2023. Results on 1/2/24 was 8.7%. Goal <9%.   Plan:   -Monitor Blood Glucose QID as directed  -Change Novlog sliding scale to high coverage TID and at HS  -Increase tresiba to 22 units at HS  -Obtain hgb A1c in 3 months. Due march 2024  -Discontinue expedite due to increased sugar contents  -BMP and CBC and INR due 1/26/24          (I48.0) Paroxysmal atrial fibrillation (H)  (Z79.01) Long term (current) use of anticoagulants  (D68.32,  T45.515A) Warfarin-induced coagulopathy   (I10) Essential hypertension with goal blood pressure less than  140/90  (E78.2) Mixed hyperlipidemia  (N18.30) Stage 3 chronic kidney disease, unspecified whether stage 3a or 3b CKD (H)  (I50.812) Chronic right-sided heart failure (H)  Comment: Chronic. Baseline Creatinine~ 1-1.2. Based on JNC-8 goals,  patients age of 78 year old, presence of diabetes or CKD, and goals of care goal BP is  <140/90 mm Hg. Patient is stable with current plan of care and routine assessment..Holding lisinopril per chart review per patient wishes. INR goal 2-3.INR via fingerstick on 12/29 was 2.6. Coumadin was placed on HOLD pending surgery. She returned without any orders. INR on 1/2/24 was 1.47 in hospital prior to discharge. Last INR via fingerstick was 1.5. INR today-2.8 via fingerstick  Plan:   -Monitor for worsening s/symptoms of concerns  -Monitor BP and HR as directed  -Continue asa 81mg daily  -Continue atorvastatin 40mg daily  -Reduce coumadin down to 2mg daily  -Monitor weights as directed. Baseline weight around 222-224# on admission but now weight noted to be 231#. Highest documented was 238#.   -Continue torsemide to 30mg daily. HOLD if SBP<100  -BMP and CBC and INR due 1/26/24     (R60.0) Edema of right extremity  (L53.9) Redness  Comment: Acute on chronic. Noted increased redness to RLE with warmth and pain soon after admission. Currently on coumadin. US negative for DVT risks. 4 areas areas are present to right leg. 3 anterior and 1 posterior (see photo above for reference). History of completing keflex with good improvement, but now redness persists to anterior portion of calf. Patient reports redness comes and goes daily. Redness was marked with black marker, Offered to start antibiotic course now, however patient wants to wait and monitor for now. I advised her that if redness persists or worsens, then to notify staff and we will start antibiotic treatment right away. In agreement to this plan below.  Unable to start prednisone given allergy risk. Suspect edema may also be  contributing to wound concerns to RLE. SLOWLY IMPROVING with 10 day course of clindamycin  Plan:  -Lymphedema therapy to assess and assist with edema control---currently on hold for now per request  -Continue wound care to RLE for now: Cleanse with normal saline. Apply bactroban to open areas. Apply weeping area with betadine. Cover weeping area with folded ABD pad and apply light wrap to RLE to keep ABD in place. Perform 1-2x and PRN given drainage. Offered more intervention with dressings, however she declines at this time.   -Monitor for worsening s/symptoms of concerns  -Continue benadryl every 6 hours PRN  -Continue hydrocortisone cream BID PRN  -BMP and CBC and INR due 1/26/24     (E11.621,  L97.425) Diabetic ulcer of left heel associated with type 2 diabetes mellitus, with muscle involvement without evidence of necrosis (H)  (Z89.512) 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)  (Z98.890) Status post debridement  (M86.9) Osteomyelitis of left foot, unspecified type (H)  (F11.20) Opioid dependence, uncomplicated (H)  (G89.4) Chronic pain syndrome  (I83.019,  I83.029,  L97.919,  L97.929) Venous stasis ulcers of both lower extremities (H)  (I89.0) Lymphedema  (Z94.5) History of skin graft placement on 1/2/24 as above  Comment: Chronic. S/p debridement 9/26/23. S/p Left guillotine BKA on 10/7/23. S/p tibial/fibular resection and debridement of stump 10/17/23. Followed by vascular. Completed course of meropenem/vancomycin 10/8 for osteomyelitis. Surgery completed 1/2/24 per review.   Plan:   -Monitor pain complaints  -In house wound provider to follow for ongoing needs   -Continue methadone 10mg daily at 1500 and 5mg TID scheduled.   -Tylenol PRN  -BMP and CBC and INR due 1/26/24  -Follow up with vascular as directed. Scheduled for telephone 11/22/24  For the thigh wound(donor site): let dry out, cover as needed. Can take hair dryer to wound, after taking off ABD, for 10 minutes a day.     For  Graft site(BKA stump): Place Xeroform, followed by ABD, cover with spandage or stockinette. In two weeks will have phone visit to assess wound.         -Apply hemp lotion per her request to right heel dry skin BID--keep at bedside and staff to apply accordingly     Electronically Signed by:  Dr. Haylie Rowe DNP, APRN, FNP-C, WCS-C, EDS-C

## 2024-01-15 ENCOUNTER — TRANSITIONAL CARE UNIT VISIT (OUTPATIENT)
Dept: GERIATRICS | Facility: CLINIC | Age: 79
End: 2024-01-15
Payer: COMMERCIAL

## 2024-01-15 VITALS
HEIGHT: 66 IN | HEART RATE: 87 BPM | DIASTOLIC BLOOD PRESSURE: 71 MMHG | SYSTOLIC BLOOD PRESSURE: 125 MMHG | RESPIRATION RATE: 18 BRPM | TEMPERATURE: 98.1 F | BODY MASS INDEX: 37.19 KG/M2 | OXYGEN SATURATION: 98 % | WEIGHT: 231.4 LBS

## 2024-01-15 DIAGNOSIS — I89.0 LYMPHEDEMA: ICD-10-CM

## 2024-01-15 DIAGNOSIS — L97.424 DIABETIC ULCER OF LEFT HEEL ASSOCIATED WITH TYPE 2 DIABETES MELLITUS, WITH NECROSIS OF BONE (H): ICD-10-CM

## 2024-01-15 DIAGNOSIS — E66.01 SEVERE OBESITY (BMI 35.0-39.9) WITH COMORBIDITY (H): ICD-10-CM

## 2024-01-15 DIAGNOSIS — J44.9 CHRONIC OBSTRUCTIVE PULMONARY DISEASE, UNSPECIFIED COPD TYPE (H): ICD-10-CM

## 2024-01-15 DIAGNOSIS — Z89.512 STATUS POST BELOW-KNEE AMPUTATION OF LEFT LOWER EXTREMITY (H): ICD-10-CM

## 2024-01-15 DIAGNOSIS — T45.515A WARFARIN-INDUCED COAGULOPATHY (H): ICD-10-CM

## 2024-01-15 DIAGNOSIS — N18.31 STAGE 3A CHRONIC KIDNEY DISEASE (H): ICD-10-CM

## 2024-01-15 DIAGNOSIS — G89.29 OTHER CHRONIC PAIN: ICD-10-CM

## 2024-01-15 DIAGNOSIS — Z98.890 STATUS POST DEBRIDEMENT: ICD-10-CM

## 2024-01-15 DIAGNOSIS — E78.5 DYSLIPIDEMIA: ICD-10-CM

## 2024-01-15 DIAGNOSIS — L89.90 PRESSURE INJURY OF SKIN, UNSPECIFIED INJURY STAGE, UNSPECIFIED LOCATION: ICD-10-CM

## 2024-01-15 DIAGNOSIS — J45.20 MILD INTERMITTENT ASTHMA WITHOUT COMPLICATION: ICD-10-CM

## 2024-01-15 DIAGNOSIS — I48.19 PERSISTENT ATRIAL FIBRILLATION (H): ICD-10-CM

## 2024-01-15 DIAGNOSIS — D62 ACUTE BLOOD LOSS ANEMIA: ICD-10-CM

## 2024-01-15 DIAGNOSIS — L53.9 REDNESS: ICD-10-CM

## 2024-01-15 DIAGNOSIS — I10 ESSENTIAL HYPERTENSION: ICD-10-CM

## 2024-01-15 DIAGNOSIS — R53.81 PHYSICAL DECONDITIONING: ICD-10-CM

## 2024-01-15 DIAGNOSIS — E66.01 SEVERE OBESITY (H): ICD-10-CM

## 2024-01-15 DIAGNOSIS — E11.42 TYPE 2 DIABETES MELLITUS WITH DIABETIC POLYNEUROPATHY, WITH LONG-TERM CURRENT USE OF INSULIN (H): ICD-10-CM

## 2024-01-15 DIAGNOSIS — Z79.4 TYPE 2 DIABETES MELLITUS WITH DIABETIC POLYNEUROPATHY, WITH LONG-TERM CURRENT USE OF INSULIN (H): ICD-10-CM

## 2024-01-15 DIAGNOSIS — E21.0 PRIMARY HYPERPARATHYROIDISM (H): ICD-10-CM

## 2024-01-15 DIAGNOSIS — M86.672 OTHER CHRONIC OSTEOMYELITIS OF LEFT FOOT (H): Primary | ICD-10-CM

## 2024-01-15 DIAGNOSIS — F11.20 OPIOID DEPENDENCE, UNCOMPLICATED (H): ICD-10-CM

## 2024-01-15 DIAGNOSIS — R52 PAIN: ICD-10-CM

## 2024-01-15 DIAGNOSIS — D68.32 WARFARIN-INDUCED COAGULOPATHY (H): ICD-10-CM

## 2024-01-15 DIAGNOSIS — K59.03 DRUG-INDUCED CONSTIPATION: ICD-10-CM

## 2024-01-15 DIAGNOSIS — I73.9 PAD (PERIPHERAL ARTERY DISEASE) (H): ICD-10-CM

## 2024-01-15 DIAGNOSIS — E11.621 DIABETIC ULCER OF LEFT HEEL ASSOCIATED WITH TYPE 2 DIABETES MELLITUS, WITH NECROSIS OF BONE (H): ICD-10-CM

## 2024-01-15 DIAGNOSIS — I50.812 CHRONIC RIGHT-SIDED HEART FAILURE (H): ICD-10-CM

## 2024-01-15 DIAGNOSIS — K59.01 SLOW TRANSIT CONSTIPATION: ICD-10-CM

## 2024-01-15 DIAGNOSIS — Z79.01 LONG TERM (CURRENT) USE OF ANTICOAGULANTS: ICD-10-CM

## 2024-01-15 DIAGNOSIS — I87.8 VENOUS STASIS: ICD-10-CM

## 2024-01-15 DIAGNOSIS — G47.33 OSA (OBSTRUCTIVE SLEEP APNEA): ICD-10-CM

## 2024-01-15 DIAGNOSIS — I48.0 PAROXYSMAL ATRIAL FIBRILLATION (H): ICD-10-CM

## 2024-01-15 PROCEDURE — 99309 SBSQ NF CARE MODERATE MDM 30: CPT | Performed by: NURSE PRACTITIONER

## 2024-01-15 RX ORDER — METHADONE HYDROCHLORIDE 10 MG/1
10 TABLET ORAL DAILY
Qty: 30 TABLET | Refills: 0 | Status: SHIPPED | OUTPATIENT
Start: 2024-01-15 | End: 2024-02-09

## 2024-01-15 RX ORDER — INSULIN ASPART 100 [IU]/ML
INJECTION, SOLUTION INTRAVENOUS; SUBCUTANEOUS
Qty: 15 ML | Refills: 11 | Status: SHIPPED | OUTPATIENT
Start: 2024-01-15 | End: 2024-03-22

## 2024-01-15 RX ORDER — METHADONE HYDROCHLORIDE 5 MG/1
5 TABLET ORAL 3 TIMES DAILY
Qty: 90 TABLET | Refills: 0 | Status: SHIPPED | OUTPATIENT
Start: 2024-01-15 | End: 2024-02-09

## 2024-01-15 NOTE — LETTER
1/15/2024        RE: Linda Loredo  [address] [facility] [id number]        [facility] GERIATRICS    Chief Complaint   Patient presents with     RECHECK     HPI:  Linda Loredo is a 78 year old  (1945), who is being seen today for an episodic care visit at: [facility])([facility]) [[id number]]. Today's concern is: The primary encounter diagnosis was Other chronic osteomyelitis of left foot (H). Diagnoses of Diabetic ulcer of left heel associated with type 2 diabetes mellitus, with necrosis of bone (H), 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), Acute blood loss anemia, Venous stasis, Essential hypertension, Type 2 diabetes mellitus with diabetic polyneuropathy, with long-term current use of insulin (H), Dyslipidemia, PAD (peripheral artery disease) (H24), Persistent atrial fibrillation (H), Stage 3a chronic kidney disease (H), Primary hyperparathyroidism (H24), Chronic obstructive pulmonary disease, unspecified COPD type (H), Mild intermittent asthma without complication, Other chronic pain, Slow transit constipation, Severe obesity (BMI 35.0-39.9) with comorbidity (H), BERLIN (obstructive sleep apnea), Physical deconditioning, Pain, Paroxysmal atrial fibrillation (H), Warfarin-induced coagulopathy  (H24), Long term (current) use of anticoagulants, Opioid dependence, uncomplicated (H), Chronic right-sided heart failure (H), Severe obesity (H), Lymphedema, Status post debridement, Redness, Pressure injury of skin, unspecified injury stage, unspecified location, and Drug-induced constipation were also pertinent to this visit.    Met with patient who denies any chest pain, palpitations, shortness of breath, RASHID, lightheadedness, dizziness, or cough. Denies any abdominal discomfort. Denies N&V. Denies B&B concerns. Denies dysuria or frequency. Denies loose or constipation. Appetite good. Sleeping well. Blood Glucose have been  "trending upward, I suspect it is due to expedite supplementation which is causing increased Blood Glucose values. She is open to recommendations below. Open areas to RLE are mostly all scabbed over, but anterior lower portion one continues to weep quite a bit, she is open to recommendations below, only open to limited interventions. Reports pain stable with current regimen.     BP Readings from Last 3 Encounters:   01/15/24 125/71   01/10/24 (!) 142/83   01/08/24 117/77     Wt Readings from Last 5 Encounters:   01/15/24 105 kg (231 lb 6.4 oz)   01/10/24 106.4 kg (234 lb 9.6 oz)   01/08/24 105.9 kg (233 lb 6.4 oz)   01/05/24 108 kg (238 lb)   01/03/24 105.3 kg (232 lb 3.2 oz)     Allergies, and PMH/PSH reviewed in EPIC today.  REVIEW OF SYSTEMS:  4 point ROS including Respiratory, CV, GI and , other than that noted in the HPI,  is negative    Objective:   /71   Pulse 87   Temp 98.1  F (36.7  C)   Resp 18   Ht 1.676 m (5' 6\")   Wt 105 kg (231 lb 6.4 oz)   SpO2 98%   BMI 37.35 kg/m    GENERAL APPEARANCE:  Alert, in no distress, oriented, morbidly obese, cooperative  ENT:  Mouth and posterior oropharynx normal, moist mucous membranes, normal hearing acuity  EYES:  EOM, conjunctivae, lids, pupils and irises normal  NECK:  No adenopathy,masses or thyromegaly  RESP:  respiratory effort and palpation of chest normal, lungs clear to auscultation , no respiratory distress  CV:  Palpation and auscultation of heart done , regular rate and rhythm, no murmur, rub, or gallop, peripheral edema 2+ in RLE, stump present to left. Wound to stump was not observed today.   ABDOMEN:  normal bowel sounds, soft, nontender, no hepatosplenomegaly or other masses, no guarding or rebound  M/S:   Slide board transfers. Wheelchair bound. Staff to assist as directed  SKIN:  Inspection of skin and subcutaneous tissue baseline, see photos  NEURO:   Cranial nerves 2-12 are normal tested and grossly at patient's baseline, no purposeful " movement in upper and lower extremities  PSYCH:  oriented X 3, normal insight, judgement and memory, affect and mood normal                Most Recent 3 CBC's:  Recent Labs   Lab Test 12/27/23  0958 12/08/23  0600 11/24/23  1012   WBC 8.0 7.5 7.0   HGB 10.5* 9.6* 9.9*   MCV 90 91 94    198 228     Most Recent 3 BMP's:  Recent Labs   Lab Test 01/02/24  1225 01/02/24  1136 01/02/24  0804 12/27/23  0958 12/08/23  1226   NA  --   --  136 137 137   POTASSIUM  --   --  4.1 4.5 4.6   CHLORIDE  --   --  94* 96* 98   CO2  --   --  34* 32* 30*   BUN  --   --  37.1* 40.4* 32.4*   CR  --   --  0.87 0.95 0.96*   ANIONGAP  --   --  8 9 9   ZAKIA  --   --  11.0* 10.6* 10.1   * 226* 232* 261* 185*     Most Recent 2 LFT's:  Recent Labs   Lab Test 11/17/23  1033 11/01/23  0613   AST 20 19   ALT 11 12   ALKPHOS 90 97   BILITOTAL 0.5 0.2     Most Recent 3 INR's:  Recent Labs   Lab Test 01/02/24  0857 12/08/23  1226 11/24/23  1012   INR 1.47* 2.73* 3.50*     Most Recent TSH and T4:  Recent Labs   Lab Test 11/24/23  1012   TSH 3.46   T4 1.20     Most Recent Hemoglobin A1c:  Recent Labs   Lab Test 01/02/24  0804   A1C 8.7*     Most Recent Anemia Panel:  Recent Labs   Lab Test 12/27/23  0958   WBC 8.0   HGB 10.5*   HCT 33.5*   MCV 90          ASSESSMENT/PLAN:  (E11.621,  L97.509,  Z79.4) Type 2 diabetes mellitus with foot ulcer, with long-term current use of insulin (H)  (E66.01) Obesity, Class III, BMI 40-49.9 (morbid obesity) (H)  Comment: Chronic. Last A1c 7% in sept 2023. Results on 1/2/24 was 8.7%. Goal <9%.   Plan:   -Monitor Blood Glucose QID as directed  -Change Novlog sliding scale to high coverage TID and at HS  -Increase tresiba to 22 units at HS  -Obtain hgb A1c in 3 months. Due march 2024  -Discontinue expedite due to increased sugar contents  -BMP and CBC and INR due 1/26/24          (I48.0) Paroxysmal atrial fibrillation (H)  (Z79.01) Long term (current) use of anticoagulants  (D68.32,  T45.515A)  Warfarin-induced coagulopathy   (I10) Essential hypertension with goal blood pressure less than 140/90  (E78.2) Mixed hyperlipidemia  (N18.30) Stage 3 chronic kidney disease, unspecified whether stage 3a or 3b CKD (H)  (I50.812) Chronic right-sided heart failure (H)  Comment: Chronic. Baseline Creatinine~ 1-1.2. Based on JNC-8 goals,  patients age of 78 year old, presence of diabetes or CKD, and goals of care goal BP is  <140/90 mm Hg. Patient is stable with current plan of care and routine assessment..Holding lisinopril per chart review per patient wishes. INR goal 2-3.INR via fingerstick on 12/29 was 2.6. Coumadin was placed on HOLD pending surgery. She returned without any orders. INR on 1/2/24 was 1.47 in hospital prior to discharge. Last INR via fingerstick was 1.5. INR today-2.8 via fingerstick  Plan:   -Monitor for worsening s/symptoms of concerns  -Monitor BP and HR as directed  -Continue asa 81mg daily  -Continue atorvastatin 40mg daily  -Reduce coumadin down to 2mg daily  -Monitor weights as directed. Baseline weight around 222-224# on admission but now weight noted to be 231#. Highest documented was 238#.   -Continue torsemide to 30mg daily. HOLD if SBP<100  -BMP and CBC and INR due 1/26/24     (R60.0) Edema of right extremity  (L53.9) Redness  Comment: Acute on chronic. Noted increased redness to RLE with warmth and pain soon after admission. Currently on coumadin. US negative for DVT risks. 4 areas areas are present to right leg. 3 anterior and 1 posterior (see photo above for reference). History of completing keflex with good improvement, but now redness persists to anterior portion of calf. Patient reports redness comes and goes daily. Redness was marked with black marker, Offered to start antibiotic course now, however patient wants to wait and monitor for now. I advised her that if redness persists or worsens, then to notify staff and we will start antibiotic treatment right away. In agreement to  this plan below.  Unable to start prednisone given allergy risk. Suspect edema may also be contributing to wound concerns to RLE. SLOWLY IMPROVING with 10 day course of clindamycin  Plan:  -Lymphedema therapy to assess and assist with edema control---currently on hold for now per request  -Continue wound care to RLE for now: Cleanse with normal saline. Apply bactroban to open areas. Apply weeping area with betadine. Cover weeping area with folded ABD pad and apply light wrap to RLE to keep ABD in place. Perform 1-2x and PRN given drainage. Offered more intervention with dressings, however she declines at this time.   -Monitor for worsening s/symptoms of concerns  -Continue benadryl every 6 hours PRN  -Continue hydrocortisone cream BID PRN  -BMP and CBC and INR due 1/26/24     (E11.621,  L97.425) Diabetic ulcer of left heel associated with type 2 diabetes mellitus, with muscle involvement without evidence of necrosis (H)  (Z89.512) 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)  (Z98.890) Status post debridement  (M86.9) Osteomyelitis of left foot, unspecified type (H)  (F11.20) Opioid dependence, uncomplicated (H)  (G89.4) Chronic pain syndrome  (I83.019,  I83.029,  L97.919,  L97.929) Venous stasis ulcers of both lower extremities (H)  (I89.0) Lymphedema  (Z94.5) History of skin graft placement on 1/2/24 as above  Comment: Chronic. S/p debridement 9/26/23. S/p Left guillotine BKA on 10/7/23. S/p tibial/fibular resection and debridement of stump 10/17/23. Followed by vascular. Completed course of meropenem/vancomycin 10/8 for osteomyelitis. Surgery completed 1/2/24 per review.   Plan:   -Monitor pain complaints  -In house wound provider to follow for ongoing needs   -Continue methadone 10mg daily at 1500 and 5mg TID scheduled.   -Tylenol PRN  -BMP and CBC and INR due 1/26/24  -Follow up with vascular as directed. Scheduled for telephone 11/22/24  For the thigh wound(donor site): let dry out, cover as  needed. Can take hair dryer to wound, after taking off ABD, for 10 minutes a day.     For Graft site(BKA stump): Place Xeroform, followed by ABD, cover with spandage or stockinette. In two weeks will have phone visit to assess wound.         -Apply hemp lotion per her request to right heel dry skin BID--keep at bedside and staff to apply accordingly     Electronically Signed by:  Dr. Haylie Rowe DNP, APRN, FNP-C, WCS-C, EDS-C        Sincerely,        Haylie Rowe, MOE CNP

## 2024-01-16 NOTE — TELEPHONE ENCOUNTER
Al received faxed info, however, is requesting clarification on a few points and is asking for a call back.    Al  [phone]

## 2024-01-17 NOTE — TELEPHONE ENCOUNTER
Returned call to Al.  Discussed wound measurements are listed in 1/8/24 office note that was faxed.  No additional questions.  Maegan Pickard, JOHANN, RN, -[facility]

## 2024-01-19 ENCOUNTER — MYC MEDICAL ADVICE (OUTPATIENT)
Dept: OTHER | Facility: CLINIC | Age: 79
End: 2024-01-19
Payer: COMMERCIAL

## 2024-01-22 ENCOUNTER — VIRTUAL VISIT (OUTPATIENT)
Dept: OTHER | Facility: CLINIC | Age: 79
End: 2024-01-22
Payer: COMMERCIAL

## 2024-01-22 DIAGNOSIS — Z89.512 STATUS POST BELOW-KNEE AMPUTATION OF LEFT LOWER EXTREMITY (H): Primary | ICD-10-CM

## 2024-01-22 PROCEDURE — 99024 POSTOP FOLLOW-UP VISIT: CPT | Mod: 93

## 2024-01-22 NOTE — PROGRESS NOTES
[facility]    Margaret Loredo is a 78 year old patient who underwent skin grafting on 01/02/2024 with Dr. Otto of her BKA. Telephone visit today to assess progress.    Skin graft continues to take, and overall via pictures is smaller in size with healthy tissue. Per Pat and her daughter, there is no issues and continues to improve. Denies pain. Donor site healed.     Overall very pleased with how Margaret is doing. Would recommend Xeroform over skin graft(stump), to be changed every other day, with stockinette. Donor site can cover due to drainage, use hair dryer for 10 minutes on donor site. Will do telephone visit in roughly 2 weeks time to assess progress.      Total telephone visit/visit: 10 minutes    Vanita Carter, CNP

## 2024-01-22 NOTE — PROGRESS NOTES
Pat is a 78 year old who is being evaluated via a billable telephone visit.      What phone number would you like to be contacted at? [phone]   How would you like to obtain your AVS? Ana Stacy MA

## 2024-01-23 NOTE — TELEPHONE ENCOUNTER
"LOV 1/22/24 with Vanita Carter NP. Pt s/p left BKA on , skin grafting of bka on 1/2/24 with Dr. Otto.   \"Will do telephone visit in roughly 2 weeks time to assess progress.\"    Pt had sent pictures on 1/19/24.   Discussed with Vanita Carter NP, she has reviewed pictures at time of 1/22/24 OV. No further action other than above. Pt has appointment on 2/7/24 for follow up already scheduled with Vanita Carter NP.    ZAMZAM Tirado, RN  [facility]  Office:  [phone] Fax: [phone]     "

## 2024-01-25 ENCOUNTER — LAB REQUISITION (OUTPATIENT)
Dept: LAB | Facility: CLINIC | Age: 79
End: 2024-01-25
Payer: COMMERCIAL

## 2024-01-25 DIAGNOSIS — N18.30 CHRONIC KIDNEY DISEASE, STAGE 3 UNSPECIFIED (H): ICD-10-CM

## 2024-01-25 DIAGNOSIS — I15.1 HYPERTENSION SECONDARY TO OTHER RENAL DISORDERS: ICD-10-CM

## 2024-01-25 DIAGNOSIS — D64.9 ANEMIA, UNSPECIFIED: ICD-10-CM

## 2024-01-25 DIAGNOSIS — I50.22 CHRONIC SYSTOLIC (CONGESTIVE) HEART FAILURE (H): ICD-10-CM

## 2024-01-25 DIAGNOSIS — I48.0 PAROXYSMAL ATRIAL FIBRILLATION (H): ICD-10-CM

## 2024-01-25 NOTE — PROGRESS NOTES
[facility] GERIATRICS    Chief Complaint   Patient presents with    RECHECK     HPI:  Linda Loredo is a 78 year old  (1945), who is being seen today for an episodic care visit at: [facility] ([facility])() [[id number]]. Today's concern is: The primary encounter diagnosis was Other chronic osteomyelitis of left foot (H). Diagnoses of Diabetic ulcer of left heel associated with type 2 diabetes mellitus, with necrosis of bone (H), 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), Acute blood loss anemia, Venous stasis, Essential hypertension, Type 2 diabetes mellitus with diabetic polyneuropathy, with long-term current use of insulin (H), Dyslipidemia, PAD (peripheral artery disease) (H24), Persistent atrial fibrillation (H), Stage 3a chronic kidney disease (H), Primary hyperparathyroidism (H24), Chronic obstructive pulmonary disease, unspecified COPD type (H), Mild intermittent asthma without complication, Other chronic pain, Slow transit constipation, Severe obesity (BMI 35.0-39.9) with comorbidity (H), BERLIN (obstructive sleep apnea), Physical deconditioning, Paroxysmal atrial fibrillation (H), Warfarin-induced coagulopathy  (H24), Long term (current) use of anticoagulants, Opioid dependence, uncomplicated (H), Chronic right-sided heart failure (H), Severe obesity (H), Lymphedema, Status post debridement, Redness, Pressure injury of skin, unspecified injury stage, unspecified location, Pain, and Skin lesion were also pertinent to this visit.    Met with patient who denies any chest pain, palpitations, shortness of breath, RASHID, lightheadedness, dizziness, or cough. Denies any abdominal discomfort. Denies N&V. Denies B&B concerns. Denies dysuria or frequency. Denies loose or constipation. Appetite good. Sleeping well. Blood Glucose values have been elevated despite treatment course. Suspect stress may be contributing. Pain stable with current treatment. Small  "area to right shin noted and continues to weep clear fluid despite numerous different wound cares. History of poor compliant with compression, however after discussion today she is open to recommendations. She reports  is coming out next week to assess her for snf placement soon.     BP Readings from Last 3 Encounters:   01/26/24 (!) 149/92   01/15/24 125/71   01/10/24 (!) 142/83     Wt Readings from Last 5 Encounters:   01/26/24 100.6 kg (221 lb 12.8 oz)   01/15/24 105 kg (231 lb 6.4 oz)   01/10/24 106.4 kg (234 lb 9.6 oz)   01/08/24 105.9 kg (233 lb 6.4 oz)   01/05/24 108 kg (238 lb)     Allergies, and PMH/PSH reviewed in EPIC today.  REVIEW OF SYSTEMS:  4 point ROS including Respiratory, CV, GI and , other than that noted in the HPI,  is negative    Objective:   BP (!) 149/92   Pulse 83   Temp 97.9  F (36.6  C)   Resp 19   Ht 1.676 m (5' 6\")   Wt 100.6 kg (221 lb 12.8 oz)   SpO2 100%   BMI 35.80 kg/m    GENERAL APPEARANCE:  Alert, in no distress, oriented, morbidly obese, cooperative  ENT:  Mouth and posterior oropharynx normal, moist mucous membranes, normal hearing acuity  EYES:  EOM, conjunctivae, lids, pupils and irises normal  NECK:  No adenopathy,masses or thyromegaly  RESP:  respiratory effort and palpation of chest normal, lungs clear to auscultation , no respiratory distress  CV:  Palpation and auscultation of heart done , regular rate and rhythm, no murmur, rub, or gallop, peripheral edema 1-2+ in RLE. Stump present with healing wound  ABDOMEN:  normal bowel sounds, soft, nontender, no hepatosplenomegaly or other masses, no guarding or rebound  M/S:   Slide board transfers. Wheelchair bound  SKIN:  scabbed areas present to RLE. Open area noted to anterior left shin area the is non healing and weeping. Stump present with healing wound and flap area to anterior left thigh present healing. See photo  NEURO:   Cranial nerves 2-12 are normal tested and grossly at patient's baseline, " no purposeful movement in upper and lower extremities  PSYCH:  oriented X 3, normal insight, judgement and memory, affect and mood normal                                Most Recent 3 CBC's:  Recent Labs   Lab Test 12/27/23  0958 12/08/23  0600 11/24/23  1012   WBC 8.0 7.5 7.0   HGB 10.5* 9.6* 9.9*   MCV 90 91 94    198 228     Most Recent 3 BMP's:  Recent Labs   Lab Test 01/02/24  1225 01/02/24  1136 01/02/24  0804 12/27/23  0958 12/08/23  1226   NA  --   --  136 137 137   POTASSIUM  --   --  4.1 4.5 4.6   CHLORIDE  --   --  94* 96* 98   CO2  --   --  34* 32* 30*   BUN  --   --  37.1* 40.4* 32.4*   CR  --   --  0.87 0.95 0.96*   ANIONGAP  --   --  8 9 9   ZAKIA  --   --  11.0* 10.6* 10.1   * 226* 232* 261* 185*     Most Recent 2 LFT's:  Recent Labs   Lab Test 11/17/23  1033 11/01/23  0613   AST 20 19   ALT 11 12   ALKPHOS 90 97   BILITOTAL 0.5 0.2     Most Recent 3 INR's:  Recent Labs   Lab Test 01/02/24  0857 12/08/23  1226 11/24/23  1012   INR 1.47* 2.73* 3.50*     Most Recent TSH and T4:  Recent Labs   Lab Test 11/24/23  1012   TSH 3.46   T4 1.20     Most Recent Hemoglobin A1c:  Recent Labs   Lab Test 01/02/24  0804   A1C 8.7*     Most Recent Anemia Panel:  Recent Labs   Lab Test 12/27/23  0958   WBC 8.0   HGB 10.5*   HCT 33.5*   MCV 90          ASSESSMENT/PLAN:  (E11.621,  L97.509,  Z79.4) Type 2 diabetes mellitus with foot ulcer, with long-term current use of insulin (H)  (E66.01) Obesity, Class III, BMI 40-49.9 (morbid obesity) (H)  Comment: Chronic. Last A1c 7% in sept 2023. Results on 1/2/24 was 8.7%. Goal <9%.   Plan:   -Monitor Blood Glucose QID as directed  -Continue Novlog sliding scale high coverage TID and at HS  -Increase tresiba to 26 units at HS  -Obtain hgb A1c in 3 months. Due march 2024  -Labs of BMP and CBC and INR due 1/26/24, however difficult stick therefore BMP and CBC was not completed.   -BMP, CBC and INR due 2/2/24           (I48.0) Paroxysmal atrial fibrillation  (H)  (Z79.01) Long term (current) use of anticoagulants  (D68.32,  T45.515A) Warfarin-induced coagulopathy   (I10) Essential hypertension with goal blood pressure less than 140/90  (E78.2) Mixed hyperlipidemia  (N18.30) Stage 3 chronic kidney disease, unspecified whether stage 3a or 3b CKD (H)  (I50.812) Chronic right-sided heart failure (H)  Comment: Chronic. Baseline Creatinine~ 1-1.2. Based on JNC-8 goals,  patients age of 78 year old, presence of diabetes or CKD, and goals of care goal BP is  <140/90 mm Hg. Patient is stable with current plan of care and routine assessment..Holding lisinopril per chart review per patient wishes. INR goal 2-3.INR on 1/2/24 was 1.47 in hospital prior to discharge. Last INR via fingerstick was 2.8 on 1/15/24. INR today via fingerstick 2.6  Plan:   -Monitor for worsening s/symptoms of concerns  -Monitor BP and HR as directed  -Continue asa 81mg daily  -Continue atorvastatin 40mg daily  -Continue coumadin 2mg daily  -Monitor weights as directed. Baseline weight around 222-224# on admission but noted highest weight noted to be 238#. Highest documented was 238#. Now recent weight 221#  -Continue torsemide to 30mg daily. HOLD if SBP<100  -Labs of BMP and CBC and INR due 1/26/24, however difficult stick therefore BMP and CBC was not completed.   -BMP, CBC and INR due 2/2/24      (R60.0) Edema of right extremity  (L53.9) Redness  Comment: Acute on chronic. Noted increased redness to RLE with warmth and pain soon after admission. Currently on coumadin. US negative for DVT risks. 4 areas areas are present to right leg. 3 anterior and 1 posterior (see photo above for reference). Suspect edema may also be contributing to wound concerns to RLE. Most areas are scabbed over except for left anterior shin area continues to weep clear fluid. No itch.   Plan:  -Lymphedema therapy to assess and assist with edema control---currently on hold for now per request  -Continue wound care to RLE for now:  Cleanse with normal saline. Apply bactroban to scabbed areas. Apply weeping area with betadine. Cover weeping area with NS moistened hydrofera blue then cover with folded ABD pad then wrap with kerlix. Ace warp to secure in place. Perform BID and PRN until healed.    -Monitor for worsening s/symptoms of concerns  -Discontinue benadryl   -Discontinue hydrocortisone cream   -Labs of BMP and CBC and INR due 1/26/24, however difficult stick therefore BMP and CBC was not completed.   -BMP, CBC and INR due 2/2/24      (E11.621,  L97.425) Diabetic ulcer of left heel associated with type 2 diabetes mellitus, with muscle involvement without evidence of necrosis (H)  (Z89.512) 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)  (Z98.890) Status post debridement  (M86.9) Osteomyelitis of left foot, unspecified type (H)  (F11.20) Opioid dependence, uncomplicated (H)  (G89.4) Chronic pain syndrome  (I83.019,  I83.029,  L97.919,  L97.929) Venous stasis ulcers of both lower extremities (H)  (I89.0) Lymphedema  (Z94.5) History of skin graft placement on 1/2/24 as above  Comment: Chronic. S/p debridement 9/26/23. S/p Left guillotine BKA on 10/7/23. S/p tibial/fibular resection and debridement of stump 10/17/23. Followed by vascular. Completed course of meropenem/vancomycin 10/8 for osteomyelitis. Surgery completed 1/2/24 per review.   Plan:   -Monitor pain complaints  -In house wound provider to follow for ongoing needs   -Continue methadone 10mg daily at 1500 and 5mg TID scheduled.   -Tylenol PRN  -Labs of BMP and CBC and INR due 1/26/24, however difficult stick therefore BMP and CBC was not completed.   -BMP, CBC and INR due 2/2/24   -Follow up with vascular as directed. Scheduled for telephone 2/7/24  -Discontinue thigh donor site wound care as area is well healed today.    For Graft site(BKA stump): Place Xeroform, followed by ABD, cover with spandage or stockinette. Perform every other day.          -Apply hemp  lotion per her request to right heel dry skin BID--keep at bedside and staff to apply accordingly     Electronically Signed by:  Dr. Haylie Rowe DNP, APRN, FNP-C, WCS-C, EDS-C

## 2024-01-26 ENCOUNTER — TRANSITIONAL CARE UNIT VISIT (OUTPATIENT)
Dept: GERIATRICS | Facility: CLINIC | Age: 79
End: 2024-01-26
Payer: COMMERCIAL

## 2024-01-26 VITALS
DIASTOLIC BLOOD PRESSURE: 92 MMHG | WEIGHT: 221.8 LBS | SYSTOLIC BLOOD PRESSURE: 149 MMHG | OXYGEN SATURATION: 100 % | RESPIRATION RATE: 19 BRPM | HEIGHT: 66 IN | HEART RATE: 83 BPM | TEMPERATURE: 97.9 F | BODY MASS INDEX: 35.65 KG/M2

## 2024-01-26 DIAGNOSIS — E21.0 PRIMARY HYPERPARATHYROIDISM (H): ICD-10-CM

## 2024-01-26 DIAGNOSIS — F11.20 OPIOID DEPENDENCE, UNCOMPLICATED (H): ICD-10-CM

## 2024-01-26 DIAGNOSIS — J44.9 CHRONIC OBSTRUCTIVE PULMONARY DISEASE, UNSPECIFIED COPD TYPE (H): ICD-10-CM

## 2024-01-26 DIAGNOSIS — Z89.512 STATUS POST BELOW-KNEE AMPUTATION OF LEFT LOWER EXTREMITY (H): ICD-10-CM

## 2024-01-26 DIAGNOSIS — L97.424 DIABETIC ULCER OF LEFT HEEL ASSOCIATED WITH TYPE 2 DIABETES MELLITUS, WITH NECROSIS OF BONE (H): ICD-10-CM

## 2024-01-26 DIAGNOSIS — K59.01 SLOW TRANSIT CONSTIPATION: ICD-10-CM

## 2024-01-26 DIAGNOSIS — I10 ESSENTIAL HYPERTENSION: ICD-10-CM

## 2024-01-26 DIAGNOSIS — D68.32 WARFARIN-INDUCED COAGULOPATHY (H): ICD-10-CM

## 2024-01-26 DIAGNOSIS — J45.20 MILD INTERMITTENT ASTHMA WITHOUT COMPLICATION: ICD-10-CM

## 2024-01-26 DIAGNOSIS — D62 ACUTE BLOOD LOSS ANEMIA: ICD-10-CM

## 2024-01-26 DIAGNOSIS — I89.0 LYMPHEDEMA: ICD-10-CM

## 2024-01-26 DIAGNOSIS — E11.621 DIABETIC ULCER OF LEFT HEEL ASSOCIATED WITH TYPE 2 DIABETES MELLITUS, WITH NECROSIS OF BONE (H): ICD-10-CM

## 2024-01-26 DIAGNOSIS — T45.515A WARFARIN-INDUCED COAGULOPATHY (H): ICD-10-CM

## 2024-01-26 DIAGNOSIS — L89.90 PRESSURE INJURY OF SKIN, UNSPECIFIED INJURY STAGE, UNSPECIFIED LOCATION: ICD-10-CM

## 2024-01-26 DIAGNOSIS — Z98.890 STATUS POST DEBRIDEMENT: ICD-10-CM

## 2024-01-26 DIAGNOSIS — Z79.01 LONG TERM (CURRENT) USE OF ANTICOAGULANTS: ICD-10-CM

## 2024-01-26 DIAGNOSIS — Z79.4 TYPE 2 DIABETES MELLITUS WITH DIABETIC POLYNEUROPATHY, WITH LONG-TERM CURRENT USE OF INSULIN (H): ICD-10-CM

## 2024-01-26 DIAGNOSIS — R52 PAIN: ICD-10-CM

## 2024-01-26 DIAGNOSIS — E78.5 DYSLIPIDEMIA: ICD-10-CM

## 2024-01-26 DIAGNOSIS — I48.0 PAROXYSMAL ATRIAL FIBRILLATION (H): ICD-10-CM

## 2024-01-26 DIAGNOSIS — I48.19 PERSISTENT ATRIAL FIBRILLATION (H): ICD-10-CM

## 2024-01-26 DIAGNOSIS — I50.812 CHRONIC RIGHT-SIDED HEART FAILURE (H): ICD-10-CM

## 2024-01-26 DIAGNOSIS — E66.01 SEVERE OBESITY (H): ICD-10-CM

## 2024-01-26 DIAGNOSIS — G89.29 OTHER CHRONIC PAIN: ICD-10-CM

## 2024-01-26 DIAGNOSIS — I73.9 PAD (PERIPHERAL ARTERY DISEASE) (H): ICD-10-CM

## 2024-01-26 DIAGNOSIS — N18.31 STAGE 3A CHRONIC KIDNEY DISEASE (H): ICD-10-CM

## 2024-01-26 DIAGNOSIS — G47.33 OSA (OBSTRUCTIVE SLEEP APNEA): ICD-10-CM

## 2024-01-26 DIAGNOSIS — M86.672 OTHER CHRONIC OSTEOMYELITIS OF LEFT FOOT (H): Primary | ICD-10-CM

## 2024-01-26 DIAGNOSIS — I87.8 VENOUS STASIS: ICD-10-CM

## 2024-01-26 DIAGNOSIS — E66.01 SEVERE OBESITY (BMI 35.0-39.9) WITH COMORBIDITY (H): ICD-10-CM

## 2024-01-26 DIAGNOSIS — E11.42 TYPE 2 DIABETES MELLITUS WITH DIABETIC POLYNEUROPATHY, WITH LONG-TERM CURRENT USE OF INSULIN (H): ICD-10-CM

## 2024-01-26 DIAGNOSIS — L98.9 SKIN LESION: ICD-10-CM

## 2024-01-26 DIAGNOSIS — L53.9 REDNESS: ICD-10-CM

## 2024-01-26 DIAGNOSIS — R53.81 PHYSICAL DECONDITIONING: ICD-10-CM

## 2024-01-26 PROCEDURE — 99309 SBSQ NF CARE MODERATE MDM 30: CPT | Performed by: NURSE PRACTITIONER

## 2024-01-26 RX ORDER — MUPIROCIN 20 MG/G
OINTMENT TOPICAL 2 TIMES DAILY
Start: 2024-01-26 | End: 2024-03-01

## 2024-01-26 NOTE — LETTER
1/26/2024        RE: Linda Loredo  [address] [facility] [id number]        [facility]    Chief Complaint   Patient presents with     RECHECK     HPI:  Linda Loredo is a 78 year old  (1945), who is being seen today for an episodic care visit at: [facility] ([facility])([facility]) [[id number]]. Today's concern is: The primary encounter diagnosis was Other chronic osteomyelitis of left foot (H). Diagnoses of Diabetic ulcer of left heel associated with type 2 diabetes mellitus, with necrosis of bone (H), 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), Acute blood loss anemia, Venous stasis, Essential hypertension, Type 2 diabetes mellitus with diabetic polyneuropathy, with long-term current use of insulin (H), Dyslipidemia, PAD (peripheral artery disease) (H24), Persistent atrial fibrillation (H), Stage 3a chronic kidney disease (H), Primary hyperparathyroidism (H24), Chronic obstructive pulmonary disease, unspecified COPD type (H), Mild intermittent asthma without complication, Other chronic pain, Slow transit constipation, Severe obesity (BMI 35.0-39.9) with comorbidity (H), BERLIN (obstructive sleep apnea), Physical deconditioning, Paroxysmal atrial fibrillation (H), Warfarin-induced coagulopathy  (H24), Long term (current) use of anticoagulants, Opioid dependence, uncomplicated (H), Chronic right-sided heart failure (H), Severe obesity (H), Lymphedema, Status post debridement, Redness, Pressure injury of skin, unspecified injury stage, unspecified location, Pain, and Skin lesion were also pertinent to this visit.    Met with patient who denies any chest pain, palpitations, shortness of breath, RASHID, lightheadedness, dizziness, or cough. Denies any abdominal discomfort. Denies N&V. Denies B&B concerns. Denies dysuria or frequency. Denies loose or constipation. Appetite good. Sleeping well. Blood Glucose values have been elevated  "despite treatment course. Suspect stress may be contributing. Pain stable with current treatment. Small area to right shin noted and continues to weep clear fluid despite numerous different wound cares. History of poor compliant with compression, however after discussion today she is open to recommendations. She reports  is coming out next week to assess her for TOMMY placement soon.     BP Readings from Last 3 Encounters:   01/26/24 (!) 149/92   01/15/24 125/71   01/10/24 (!) 142/83     Wt Readings from Last 5 Encounters:   01/26/24 100.6 kg (221 lb 12.8 oz)   01/15/24 105 kg (231 lb 6.4 oz)   01/10/24 106.4 kg (234 lb 9.6 oz)   01/08/24 105.9 kg (233 lb 6.4 oz)   01/05/24 108 kg (238 lb)     Allergies, and PMH/PSH reviewed in EPIC today.  REVIEW OF SYSTEMS:  4 point ROS including Respiratory, CV, GI and , other than that noted in the HPI,  is negative    Objective:   BP (!) 149/92   Pulse 83   Temp 97.9  F (36.6  C)   Resp 19   Ht 1.676 m (5' 6\")   Wt 100.6 kg (221 lb 12.8 oz)   SpO2 100%   BMI 35.80 kg/m    GENERAL APPEARANCE:  Alert, in no distress, oriented, morbidly obese, cooperative  ENT:  Mouth and posterior oropharynx normal, moist mucous membranes, normal hearing acuity  EYES:  EOM, conjunctivae, lids, pupils and irises normal  NECK:  No adenopathy,masses or thyromegaly  RESP:  respiratory effort and palpation of chest normal, lungs clear to auscultation , no respiratory distress  CV:  Palpation and auscultation of heart done , regular rate and rhythm, no murmur, rub, or gallop, peripheral edema 1-2+ in RLE. Stump present with healing wound  ABDOMEN:  normal bowel sounds, soft, nontender, no hepatosplenomegaly or other masses, no guarding or rebound  M/S:   Slide board transfers. Wheelchair bound  SKIN:  scabbed areas present to RLE. Open area noted to anterior left shin area the is non healing and weeping. Stump present with healing wound and flap area to anterior left thigh present " healing. See photo  NEURO:   Cranial nerves 2-12 are normal tested and grossly at patient's baseline, no purposeful movement in upper and lower extremities  PSYCH:  oriented X 3, normal insight, judgement and memory, affect and mood normal                                Most Recent 3 CBC's:  Recent Labs   Lab Test 12/27/23  0958 12/08/23  0600 11/24/23  1012   WBC 8.0 7.5 7.0   HGB 10.5* 9.6* 9.9*   MCV 90 91 94    198 228     Most Recent 3 BMP's:  Recent Labs   Lab Test 01/02/24  1225 01/02/24  1136 01/02/24  0804 12/27/23  0958 12/08/23  1226   NA  --   --  136 137 137   POTASSIUM  --   --  4.1 4.5 4.6   CHLORIDE  --   --  94* 96* 98   CO2  --   --  34* 32* 30*   BUN  --   --  37.1* 40.4* 32.4*   CR  --   --  0.87 0.95 0.96*   ANIONGAP  --   --  8 9 9   ZAKIA  --   --  11.0* 10.6* 10.1   * 226* 232* 261* 185*     Most Recent 2 LFT's:  Recent Labs   Lab Test 11/17/23  1033 11/01/23  0613   AST 20 19   ALT 11 12   ALKPHOS 90 97   BILITOTAL 0.5 0.2     Most Recent 3 INR's:  Recent Labs   Lab Test 01/02/24  0857 12/08/23  1226 11/24/23  1012   INR 1.47* 2.73* 3.50*     Most Recent TSH and T4:  Recent Labs   Lab Test 11/24/23  1012   TSH 3.46   T4 1.20     Most Recent Hemoglobin A1c:  Recent Labs   Lab Test 01/02/24  0804   A1C 8.7*     Most Recent Anemia Panel:  Recent Labs   Lab Test 12/27/23  0958   WBC 8.0   HGB 10.5*   HCT 33.5*   MCV 90          ASSESSMENT/PLAN:  (E11.621,  L97.509,  Z79.4) Type 2 diabetes mellitus with foot ulcer, with long-term current use of insulin (H)  (E66.01) Obesity, Class III, BMI 40-49.9 (morbid obesity) (H)  Comment: Chronic. Last A1c 7% in sept 2023. Results on 1/2/24 was 8.7%. Goal <9%.   Plan:   -Monitor Blood Glucose QID as directed  -Continue Novlog sliding scale high coverage TID and at HS  -Increase tresiba to 26 units at HS  -Obtain hgb A1c in 3 months. Due march 2024  -Labs of BMP and CBC and INR due 1/26/24, however difficult stick therefore BMP and CBC was  not completed.   -BMP, CBC and INR due 2/2/24           (I48.0) Paroxysmal atrial fibrillation (H)  (Z79.01) Long term (current) use of anticoagulants  (D68.32,  T45.515A) Warfarin-induced coagulopathy   (I10) Essential hypertension with goal blood pressure less than 140/90  (E78.2) Mixed hyperlipidemia  (N18.30) Stage 3 chronic kidney disease, unspecified whether stage 3a or 3b CKD (H)  (I50.812) Chronic right-sided heart failure (H)  Comment: Chronic. Baseline Creatinine~ 1-1.2. Based on JNC-8 goals,  patients age of 78 year old, presence of diabetes or CKD, and goals of care goal BP is  <140/90 mm Hg. Patient is stable with current plan of care and routine assessment..Holding lisinopril per chart review per patient wishes. INR goal 2-3.INR on 1/2/24 was 1.47 in hospital prior to discharge. Last INR via fingerstick was 2.8 on 1/15/24. INR today via fingerstick 2.6  Plan:   -Monitor for worsening s/symptoms of concerns  -Monitor BP and HR as directed  -Continue asa 81mg daily  -Continue atorvastatin 40mg daily  -Continue coumadin 2mg daily  -Monitor weights as directed. Baseline weight around 222-224# on admission but noted highest weight noted to be 238#. Highest documented was 238#. Now recent weight 221#  -Continue torsemide to 30mg daily. HOLD if SBP<100  -Labs of BMP and CBC and INR due 1/26/24, however difficult stick therefore BMP and CBC was not completed.   -BMP, CBC and INR due 2/2/24      (R60.0) Edema of right extremity  (L53.9) Redness  Comment: Acute on chronic. Noted increased redness to RLE with warmth and pain soon after admission. Currently on coumadin. US negative for DVT risks. 4 areas areas are present to right leg. 3 anterior and 1 posterior (see photo above for reference). Suspect edema may also be contributing to wound concerns to RLE. Most areas are scabbed over except for left anterior shin area continues to weep clear fluid. No itch.   Plan:  -Lymphedema therapy to assess and assist with  edema control---currently on hold for now per request  -Continue wound care to RLE for now: Cleanse with normal saline. Apply bactroban to scabbed areas. Apply weeping area with betadine. Cover weeping area with NS moistened hydrofera blue then cover with folded ABD pad then wrap with kerlix. Ace warp to secure in place. Perform BID and PRN until healed.    -Monitor for worsening s/symptoms of concerns  -Discontinue benadryl   -Discontinue hydrocortisone cream   -Labs of BMP and CBC and INR due 1/26/24, however difficult stick therefore BMP and CBC was not completed.   -BMP, CBC and INR due 2/2/24      (E11.621,  L97.425) Diabetic ulcer of left heel associated with type 2 diabetes mellitus, with muscle involvement without evidence of necrosis (H)  (Z89.512) 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)  (Z98.890) Status post debridement  (M86.9) Osteomyelitis of left foot, unspecified type (H)  (F11.20) Opioid dependence, uncomplicated (H)  (G89.4) Chronic pain syndrome  (I83.019,  I83.029,  L97.919,  L97.929) Venous stasis ulcers of both lower extremities (H)  (I89.0) Lymphedema  (Z94.5) History of skin graft placement on 1/2/24 as above  Comment: Chronic. S/p debridement 9/26/23. S/p Left guillotine BKA on 10/7/23. S/p tibial/fibular resection and debridement of stump 10/17/23. Followed by vascular. Completed course of meropenem/vancomycin 10/8 for osteomyelitis. Surgery completed 1/2/24 per review.   Plan:   -Monitor pain complaints  -In house wound provider to follow for ongoing needs   -Continue methadone 10mg daily at 1500 and 5mg TID scheduled.   -Tylenol PRN  -Labs of BMP and CBC and INR due 1/26/24, however difficult stick therefore BMP and CBC was not completed.   -BMP, CBC and INR due 2/2/24   -Follow up with vascular as directed. Scheduled for telephone 2/7/24  -Discontinue thigh donor site wound care as area is well healed today.    For Graft site(BKA stump): Place Xeroform, followed by  ABD, cover with spandage or stockinette. Perform every other day.          -Apply hemp lotion per her request to right heel dry skin BID--keep at bedside and staff to apply accordingly     Electronically Signed by:  Dr. Haylie Rowe DNP, APRN, FNP-C, WCS-C, EDS-C          Sincerely,        Haylie Rowe, MOE CNP

## 2024-02-01 ENCOUNTER — LAB REQUISITION (OUTPATIENT)
Dept: LAB | Facility: CLINIC | Age: 79
End: 2024-02-01
Payer: COMMERCIAL

## 2024-02-01 DIAGNOSIS — I10 ESSENTIAL (PRIMARY) HYPERTENSION: ICD-10-CM

## 2024-02-01 DIAGNOSIS — N18.9 CHRONIC KIDNEY DISEASE, UNSPECIFIED: ICD-10-CM

## 2024-02-01 DIAGNOSIS — I48.0 PAROXYSMAL ATRIAL FIBRILLATION (H): ICD-10-CM

## 2024-02-01 DIAGNOSIS — D64.9 ANEMIA, UNSPECIFIED: ICD-10-CM

## 2024-02-01 PROBLEM — L97.919 VENOUS STASIS ULCERS OF BOTH LOWER EXTREMITIES (H): Status: RESOLVED | Noted: 2022-01-13 | Resolved: 2024-02-01

## 2024-02-01 PROBLEM — L97.929 VENOUS STASIS ULCERS OF BOTH LOWER EXTREMITIES (H): Status: RESOLVED | Noted: 2022-01-13 | Resolved: 2024-02-01

## 2024-02-01 PROBLEM — I83.019 VENOUS STASIS ULCERS OF BOTH LOWER EXTREMITIES (H): Status: RESOLVED | Noted: 2022-01-13 | Resolved: 2024-02-01

## 2024-02-01 PROBLEM — L89.310 PRESSURE INJURY OF RIGHT BUTTOCK, UNSTAGEABLE (H): Status: RESOLVED | Noted: 2024-01-03 | Resolved: 2024-02-01

## 2024-02-01 PROBLEM — I83.029 VENOUS STASIS ULCERS OF BOTH LOWER EXTREMITIES (H): Status: RESOLVED | Noted: 2022-01-13 | Resolved: 2024-02-01

## 2024-02-01 NOTE — PROGRESS NOTES
[facility] GERIATRICS    Chief Complaint   Patient presents with    RECHECK     HPI:  Linda Loredo is a 78 year old  (1945), who is being seen today for an episodic care visit at: [facility] ([facility])([facility]) [[id number]]. Today's concern is: The primary encounter diagnosis was Other chronic osteomyelitis of left foot (H). Diagnoses of Diabetic ulcer of left heel associated with type 2 diabetes mellitus, with necrosis of bone (H), 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), Acute blood loss anemia, Venous stasis, Essential hypertension, Type 2 diabetes mellitus with diabetic polyneuropathy, with long-term current use of insulin (H), Dyslipidemia, PAD (peripheral artery disease) (H24), Persistent atrial fibrillation (H), Stage 3a chronic kidney disease (H), Primary hyperparathyroidism (H24), Chronic obstructive pulmonary disease, unspecified COPD type (H), Mild intermittent asthma without complication, Other chronic pain, Slow transit constipation, Severe obesity (BMI 35.0-39.9) with comorbidity (H), BERLIN (obstructive sleep apnea), Physical deconditioning, Paroxysmal atrial fibrillation (H), Warfarin-induced coagulopathy  (H24), Long term (current) use of anticoagulants, Opioid dependence, uncomplicated (H), Chronic right-sided heart failure (H), Severe obesity (H), Lymphedema, Status post debridement, Redness, Skin lesion, Major depressive disorder, recurrent episode, moderate (H), and Essential hypertension with goal blood pressure less than 140/90 were also pertinent to this visit.    Met with patient who denies any chest pain, palpitations, shortness of breath, RASHID, lightheadedness, dizziness, or cough. Denies any abdominal discomfort. Denies N&V. Denies B&B concerns. Denies dysuria or frequency. Denies loose or constipation. She reports now trialing using the toilet in bathroom versus bedside commode. Appetite good. Sleeping well. Blood Glucose  "values fluctuating--suspect stress contributing. She reports previously being followed by endocrine. Was previously on metformin but unknown why this stopped. She was instructed to contact endocrine to schedule appt soon when able, which she reports she will do. She was recently assessed for [facility], but per patient she was declined due to her weeping wound on RLE. RLE continues to weep. Historically noncompliant with compression. Now open to recommendations. She does report weeping has limited with current course, however still weeps. Redness worsens when no compression is applied. Mood stable, but often reports staff concerns  pain stable with current regimen. She reports torsemide medication was out yesterday therefore no dose was given. Weight gain noted.     BP Readings from Last 3 Encounters:   02/02/24 114/66   01/26/24 (!) 149/92   01/15/24 125/71     Wt Readings from Last 5 Encounters:   02/02/24 104.6 kg (230 lb 9.6 oz)   01/26/24 100.6 kg (221 lb 12.8 oz)   01/15/24 105 kg (231 lb 6.4 oz)   01/10/24 106.4 kg (234 lb 9.6 oz)   01/08/24 105.9 kg (233 lb 6.4 oz)     Allergies, and PMH/PSH reviewed in EPIC today.  REVIEW OF SYSTEMS:  4 point ROS including Respiratory, CV, GI and , other than that noted in the HPI,  is negative    Objective:   /66   Pulse 90   Temp 98.1  F (36.7  C)   Resp 18   Ht 1.676 m (5' 6\")   Wt 104.6 kg (230 lb 9.6 oz)   SpO2 98%   BMI 37.22 kg/m    GENERAL APPEARANCE:  Alert, in no distress, oriented, morbidly obese, cooperative  ENT:  Mouth and posterior oropharynx normal, moist mucous membranes, normal hearing acuity  EYES:  EOM, conjunctivae, lids, pupils and irises normal  NECK:  No adenopathy,masses or thyromegaly  RESP:  respiratory effort and palpation of chest normal, lungs clear to auscultation , no respiratory distress  CV:  Palpation and auscultation of heart done , regular rate and rhythm, no murmur, rub, or gallop, peripheral edema 2-3+ in RLE  ABDOMEN:  " normal bowel sounds, soft, nontender, no hepatosplenomegaly or other masses, no guarding or rebound  M/S:   Slide board transfers. Wheelchair bound.   SKIN:  wound healing well, no signs of infection see photos below  NEURO:   Cranial nerves 2-12 are normal tested and grossly at patient's baseline, no purposeful movement in upper and lower extremities  PSYCH:  oriented X 3, normal insight, judgement and memory, affect and mood normal                            Most Recent 3 CBC's:  Recent Labs   Lab Test 02/02/24  1043 12/27/23  0958 12/08/23  0600   WBC 9.2 8.0 7.5   HGB 10.9* 10.5* 9.6*   MCV 91 90 91    199 198     Most Recent 3 BMP's:  Recent Labs   Lab Test 01/02/24  1225 01/02/24  1136 01/02/24  0804 12/27/23  0958 12/08/23  1226   NA  --   --  136 137 137   POTASSIUM  --   --  4.1 4.5 4.6   CHLORIDE  --   --  94* 96* 98   CO2  --   --  34* 32* 30*   BUN  --   --  37.1* 40.4* 32.4*   CR  --   --  0.87 0.95 0.96*   ANIONGAP  --   --  8 9 9   ZAKIA  --   --  11.0* 10.6* 10.1   * 226* 232* 261* 185*     Most Recent 2 LFT's:  Recent Labs   Lab Test 11/17/23  1033 11/01/23  0613   AST 20 19   ALT 11 12   ALKPHOS 90 97   BILITOTAL 0.5 0.2     Most Recent 3 INR's:  Recent Labs   Lab Test 01/02/24  0857 12/08/23  1226 11/24/23  1012   INR 1.47* 2.73* 3.50*     Most Recent Hemoglobin A1c:  Recent Labs   Lab Test 01/02/24  0804   A1C 8.7*     Most Recent Anemia Panel:  Recent Labs   Lab Test 02/02/24  1043   WBC 9.2   HGB 10.9*   HCT 34.6*   MCV 91          ASSESSMENT/PLAN:  (E11.621,  L97.509,  Z79.4) Type 2 diabetes mellitus with foot ulcer, with long-term current use of insulin (H)  (E66.01) Obesity, Class III, BMI 40-49.9 (morbid obesity) (H)  Comment: Chronic. Last A1c 7% in sept 2023. Results on 1/2/24 was 8.7%. Goal <9%. Discussed history of being on metformin in the past. No longer on this and she does not know why. I suspect it may be due to GUSTAVO history. She reports being followed by  endocrine and is wanting to see them again.   Plan:   -Monitor Blood Glucose QID as directed  -Continue Novlog sliding scale high coverage TID and at HS  -Increase tresiba 30 units at HS  -Advised her to schedule follow up with endocrinology and staff to assist with scheduling transportation. Pending appt  -Obtain hgb A1c in 3 months. Due march 2024  -BMP, CBC and INR due 2/2/24 --results pending  -BMP and INR due 2/16/24               (I48.0) Paroxysmal atrial fibrillation (H)  (Z79.01) Long term (current) use of anticoagulants  (D68.32,  T45.515A) Warfarin-induced coagulopathy   (I10) Essential hypertension with goal blood pressure less than 140/90  (E78.2) Mixed hyperlipidemia  (N18.30) Stage 3 chronic kidney disease, unspecified whether stage 3a or 3b CKD (H)  (I50.812) Chronic right-sided heart failure (H)  Comment: Chronic. Baseline Creatinine~ 1-1.2. Based on JNC-8 goals,  patients age of 78 year old, presence of diabetes or CKD, and goals of care goal BP is  <140/90 mm Hg. Patient is stable with current plan of care and routine assessment..Holding lisinopril per chart review per patient wishes. INR goal 2-3. Last INR via fingerstick was 2.6 on 1/26/24. INR today via fingerstick 2.8  Plan:   -Monitor for worsening s/symptoms of concerns  -Monitor BP and HR as directed  -Continue asa 81mg daily  -Continue atorvastatin 40mg daily  -Continue coumadin 2mg daily  -Monitor weights as directed. Baseline weight around 222-224# on admission but noted highest weight noted to be 238#. Highest documented was 238#. Now recent weight 230#  -Increase torsemide to 40mg daily. HOLD if SBP<100   -BMP, CBC and INR due 2/2/24 --results pending  -BMP and INR due 2/16/24         (R60.0) Edema of right extremity  (L53.9) Redness  Comment: Acute on chronic. Noted increased redness to RLE with warmth and pain soon after admission. Currently on coumadin. US negative for DVT risks. 4 areas areas are present to right leg. 3 anterior and  1 posterior (see photo above for reference). Suspect edema may also be contributing to wound concerns to RLE. Most areas are scabbed over except for left anterior shin area continues to weep clear fluid. No itch.   Plan:  -Lymphedema therapy to assess and assist with edema control---currently on hold for now per request  -Change wound care to RLE for now: Cleanse with normal saline. Apply bactroban to scabbed areas. Apply weeping area with betadine. Cover weeping area with calcium alignate then cover with folded ABD pad then wrap with kerlix. Ace wrap to secure in place. Perform BID and PRN until healed.    -Monitor for worsening s/symptoms of concerns   -BMP, CBC and INR due 2/2/24 --results pending  -BMP and INR due 2/16/24     (E11.621,  L97.425) Diabetic ulcer of left heel associated with type 2 diabetes mellitus, with muscle involvement without evidence of necrosis (H)  (Z89.512) 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)  (Z98.890) Status post debridement  (M86.9) Osteomyelitis of left foot, unspecified type (H)  (F11.20) Opioid dependence, uncomplicated (H)  (G89.4) Chronic pain syndrome  (I83.019,  I83.029,  L97.919,  L97.929) Venous stasis ulcers of both lower extremities (H)  (I89.0) Lymphedema  (Z94.5) History of skin graft placement on 1/2/24 as above  Comment: Chronic. S/p debridement 9/26/23. S/p Left guillotine BKA on 10/7/23. S/p tibial/fibular resection and debridement of stump 10/17/23. Followed by vascular. Completed course of meropenem/vancomycin 10/8 for osteomyelitis. Surgery completed 1/2/24 per review.   Plan:   -Monitor pain complaints  -Continue methadone 10mg daily at 1500 and 5mg TID scheduled.   -Tylenol PRN  -BMP, CBC and INR due 2/2/24 --results pending  -BMP and INR due 2/16/24  -Follow up with vascular as directed. Scheduled for telephone 2/7/24  -Aquaphor to thigh daily and PRN  -For Graft site(BKA stump): Place Xeroform, followed by ABD, cover with spandage or  dilshad. Perform every other day.    Electronically Signed by:  Dr. Haylie Rowe DNP, APRN, FNP-C, WCS-C, EDS-C         Face to Face and Medical Necessity Statement for DME Provider visit    Demographic Information on Linda Loredo:  Gender: female  : 1945  [id number] [facility] [address]  [phone] (home)     Medical Record: [id number]  Social Security Number: [id number]  Primary Care Provider: Ish Brown  Insurance: Payor: [facility] / Plan: [employer] MEDICARE / Product Type: HMO /     HPI:   Linda Loredo is a 78 year old  (1945), who is being seen today for a face to face provider visit at [facility]; medical necessity statement for DME included. This patient requires the following:  DME Ordered and Medical Necessity Statement   Custom wheelchair per therapy documentation--see mobility device form with additional documentation per therapy team.    Wheelchair Documentation  Size: Per therapy recommendations  Corresponding cushion: Yes: per therapy recommendations  Standard foot rests:  per therapy recommendations  Elevating leg rests:  per therapy recommendations  Arm rests: Yes: per therapy recommendations  Lap tray: No  Dose the patient use oxygen? No   Is the patient able to propel wheelchair? Yes   1. The patient has mobility limitations that impairs their ability to participate in one or more mobility related activities: Toileting, Grooming, and Bathing.  The wheelchair is suitable and necessary for use in the patient's home.  2. The patient's mobility limitations cannot be safely resolved by using a cane/walker:Yes    Reason why a cane or walker will not meet the patient's needs. (ie: balance, tolerance, level of assistance) AKA of left leg. Nonambulatory status  3. The patients home has adequate access to use a manual wheelchair:Yes  4. The use of a manual wheelchair on a regular basis will improve the patients  ability to participate in mobility related ADL's at home:Yes  5. The patient is willing to use a manual wheelchair at home:Yes  6. The patient has adequate upper body strength and the mental capability to safely use a manual wheelchair and/or has a caregiver that is able to assist: Yes  7. Does the patient have a lower extremity injury or edema?Yes  Reason for Type of Wheelchair  Wt Readings from Last 5 Encounters:   02/02/24 104.6 kg (230 lb 9.6 oz)   01/26/24 100.6 kg (221 lb 12.8 oz)   01/15/24 105 kg (231 lb 6.4 oz)   01/10/24 106.4 kg (234 lb 9.6 oz)   01/08/24 105.9 kg (233 lb 6.4 oz)     Pt needing above DME with expected length of need of 99 year  due to medical necessity associated with following diagnosis:     Other chronic osteomyelitis of left foot (H)  Diabetic ulcer of left heel associated with type 2 diabetes mellitus, with necrosis of bone (H)  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)  Acute blood loss anemia  Venous stasis  Essential hypertension  Type 2 diabetes mellitus with diabetic polyneuropathy, with long-term current use of insulin (H)  Dyslipidemia  PAD (peripheral artery disease) (H24)  Persistent atrial fibrillation (H)  Stage 3a chronic kidney disease (H)  Primary hyperparathyroidism (H24)  Chronic obstructive pulmonary disease, unspecified COPD type (H)  Mild intermittent asthma without complication  Other chronic pain  Slow transit constipation  Severe obesity (BMI 35.0-39.9) with comorbidity (H)  BERLIN (obstructive sleep apnea)  Physical deconditioning  Paroxysmal atrial fibrillation (H)  Warfarin-induced coagulopathy  (H24)  Long term (current) use of anticoagulants  Opioid dependence, uncomplicated (H)  Chronic right-sided heart failure (H)  Severe obesity (H)  Lymphedema  Status post debridement  Redness  Skin lesion  Major depressive disorder, recurrent episode, moderate (H)  Essential hypertension with goal blood pressure less than 140/90      PMH   has a past  "medical history of Depressive disorder, not elsewhere classified, Herpes zoster without mention of complication, Hypercalcemia (2/24/2007), Mild intermittent asthma, Other urinary incontinence, Type II or unspecified type diabetes mellitus with renal manifestations, uncontrolled(250.42) (H), Type II or unspecified type diabetes mellitus without mention of complication, not stated as uncontrolled, and Unspecified essential hypertension.    ROS:4 point ROS including Respiratory, CV, GI and , other than that noted in the HPI,  is negative    EXAM  /66   Pulse 90   Temp 98.1  F (36.7  C)   Resp 18   Ht 1.676 m (5' 6\")   Wt 104.6 kg (230 lb 9.6 oz)   SpO2 98%   BMI 37.22 kg/m    GENERAL APPEARANCE:  Alert, in no distress, oriented, morbidly obese, cooperative  ENT:  Mouth and posterior oropharynx normal, moist mucous membranes, normal hearing acuity  EYES:  EOM, conjunctivae, lids, pupils and irises normal  NECK:  No adenopathy,masses or thyromegaly  RESP:  respiratory effort and palpation of chest normal, lungs clear to auscultation , no respiratory distress  CV:  Palpation and auscultation of heart done , regular rate and rhythm, no murmur, rub, or gallop, peripheral edema 2-3+ in RLE  ABDOMEN:  normal bowel sounds, soft, nontender, no hepatosplenomegaly or other masses, no guarding or rebound  M/S:   Slide board transfers. Wheelchair bound.   SKIN:  wound healing well, no signs of infection see photos below  NEURO:   Cranial nerves 2-12 are normal tested and grossly at patient's baseline, no purposeful movement in upper and lower extremities  PSYCH:  oriented X 3, normal insight, judgement and memory, affect and mood normal      ASSESSMENT/PLAN:  1. Other chronic osteomyelitis of left foot (H)    2. Diabetic ulcer of left heel associated with type 2 diabetes mellitus, with necrosis of bone (H)    3. 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)    4. Acute blood loss anemia    5. Venous " stasis    6. Essential hypertension    7. Type 2 diabetes mellitus with diabetic polyneuropathy, with long-term current use of insulin (H)    8. Dyslipidemia    9. PAD (peripheral artery disease) (H24)    10. Persistent atrial fibrillation (H)    11. Stage 3a chronic kidney disease (H)    12. Primary hyperparathyroidism (H24)    13. Chronic obstructive pulmonary disease, unspecified COPD type (H)    14. Mild intermittent asthma without complication    15. Other chronic pain    16. Slow transit constipation    17. Severe obesity (BMI 35.0-39.9) with comorbidity (H)    18. BERLIN (obstructive sleep apnea)    19. Physical deconditioning    20. Paroxysmal atrial fibrillation (H)    21. Warfarin-induced coagulopathy  (H24)    22. Long term (current) use of anticoagulants    23. Opioid dependence, uncomplicated (H)    24. Chronic right-sided heart failure (H)    25. Severe obesity (H)    26. Lymphedema    27. Status post debridement    28. Redness    29. Skin lesion    30. Major depressive disorder, recurrent episode, moderate (H)    31. Essential hypertension with goal blood pressure less than 140/90        Orders:  1. Facility staff/TC to contact DME company to get their order form for provider to fill out    ELECTRONICALLY SIGNED BY ROJELIO CERTIFIED PROVIDER:  MOE Fitch CNP   NPI: [id number]  [facility]  [address] [place] [address], [address]  [place] [address]

## 2024-02-02 ENCOUNTER — TRANSITIONAL CARE UNIT VISIT (OUTPATIENT)
Dept: GERIATRICS | Facility: CLINIC | Age: 79
End: 2024-02-02
Payer: COMMERCIAL

## 2024-02-02 ENCOUNTER — MEDICAL CORRESPONDENCE (OUTPATIENT)
Dept: HEALTH INFORMATION MANAGEMENT | Facility: CLINIC | Age: 79
End: 2024-02-02

## 2024-02-02 VITALS
WEIGHT: 230.6 LBS | OXYGEN SATURATION: 98 % | HEART RATE: 90 BPM | RESPIRATION RATE: 18 BRPM | BODY MASS INDEX: 37.06 KG/M2 | HEIGHT: 66 IN | TEMPERATURE: 98.1 F | SYSTOLIC BLOOD PRESSURE: 114 MMHG | DIASTOLIC BLOOD PRESSURE: 66 MMHG

## 2024-02-02 DIAGNOSIS — L53.9 REDNESS: ICD-10-CM

## 2024-02-02 DIAGNOSIS — J45.20 MILD INTERMITTENT ASTHMA WITHOUT COMPLICATION: ICD-10-CM

## 2024-02-02 DIAGNOSIS — I48.19 PERSISTENT ATRIAL FIBRILLATION (H): ICD-10-CM

## 2024-02-02 DIAGNOSIS — I10 ESSENTIAL HYPERTENSION: ICD-10-CM

## 2024-02-02 DIAGNOSIS — I48.0 PAROXYSMAL ATRIAL FIBRILLATION (H): ICD-10-CM

## 2024-02-02 DIAGNOSIS — F11.20 OPIOID DEPENDENCE, UNCOMPLICATED (H): ICD-10-CM

## 2024-02-02 DIAGNOSIS — N18.31 STAGE 3A CHRONIC KIDNEY DISEASE (H): ICD-10-CM

## 2024-02-02 DIAGNOSIS — E66.01 SEVERE OBESITY (H): ICD-10-CM

## 2024-02-02 DIAGNOSIS — K59.01 SLOW TRANSIT CONSTIPATION: ICD-10-CM

## 2024-02-02 DIAGNOSIS — G47.33 OSA (OBSTRUCTIVE SLEEP APNEA): ICD-10-CM

## 2024-02-02 DIAGNOSIS — E78.5 DYSLIPIDEMIA: ICD-10-CM

## 2024-02-02 DIAGNOSIS — E11.621 DIABETIC ULCER OF LEFT HEEL ASSOCIATED WITH TYPE 2 DIABETES MELLITUS, WITH NECROSIS OF BONE (H): ICD-10-CM

## 2024-02-02 DIAGNOSIS — T45.515A WARFARIN-INDUCED COAGULOPATHY (H): ICD-10-CM

## 2024-02-02 DIAGNOSIS — R53.81 PHYSICAL DECONDITIONING: ICD-10-CM

## 2024-02-02 DIAGNOSIS — D68.32 WARFARIN-INDUCED COAGULOPATHY (H): ICD-10-CM

## 2024-02-02 DIAGNOSIS — E11.42 TYPE 2 DIABETES MELLITUS WITH DIABETIC POLYNEUROPATHY, WITH LONG-TERM CURRENT USE OF INSULIN (H): ICD-10-CM

## 2024-02-02 DIAGNOSIS — E66.01 SEVERE OBESITY (BMI 35.0-39.9) WITH COMORBIDITY (H): ICD-10-CM

## 2024-02-02 DIAGNOSIS — I50.812 CHRONIC RIGHT-SIDED HEART FAILURE (H): ICD-10-CM

## 2024-02-02 DIAGNOSIS — Z98.890 STATUS POST DEBRIDEMENT: ICD-10-CM

## 2024-02-02 DIAGNOSIS — M86.672 OTHER CHRONIC OSTEOMYELITIS OF LEFT FOOT (H): Primary | ICD-10-CM

## 2024-02-02 DIAGNOSIS — D62 ACUTE BLOOD LOSS ANEMIA: ICD-10-CM

## 2024-02-02 DIAGNOSIS — I89.0 LYMPHEDEMA: ICD-10-CM

## 2024-02-02 DIAGNOSIS — Z79.01 LONG TERM (CURRENT) USE OF ANTICOAGULANTS: ICD-10-CM

## 2024-02-02 DIAGNOSIS — Z89.512 STATUS POST BELOW-KNEE AMPUTATION OF LEFT LOWER EXTREMITY (H): ICD-10-CM

## 2024-02-02 DIAGNOSIS — J44.9 CHRONIC OBSTRUCTIVE PULMONARY DISEASE, UNSPECIFIED COPD TYPE (H): ICD-10-CM

## 2024-02-02 DIAGNOSIS — Z79.4 TYPE 2 DIABETES MELLITUS WITH DIABETIC POLYNEUROPATHY, WITH LONG-TERM CURRENT USE OF INSULIN (H): ICD-10-CM

## 2024-02-02 DIAGNOSIS — L97.424 DIABETIC ULCER OF LEFT HEEL ASSOCIATED WITH TYPE 2 DIABETES MELLITUS, WITH NECROSIS OF BONE (H): ICD-10-CM

## 2024-02-02 DIAGNOSIS — G89.29 OTHER CHRONIC PAIN: ICD-10-CM

## 2024-02-02 DIAGNOSIS — F33.1 MAJOR DEPRESSIVE DISORDER, RECURRENT EPISODE, MODERATE (H): ICD-10-CM

## 2024-02-02 DIAGNOSIS — I10 ESSENTIAL HYPERTENSION WITH GOAL BLOOD PRESSURE LESS THAN 140/90: ICD-10-CM

## 2024-02-02 DIAGNOSIS — I73.9 PAD (PERIPHERAL ARTERY DISEASE) (H): ICD-10-CM

## 2024-02-02 DIAGNOSIS — I87.8 VENOUS STASIS: ICD-10-CM

## 2024-02-02 DIAGNOSIS — E21.0 PRIMARY HYPERPARATHYROIDISM (H): ICD-10-CM

## 2024-02-02 DIAGNOSIS — L98.9 SKIN LESION: ICD-10-CM

## 2024-02-02 LAB
ANION GAP SERPL CALCULATED.3IONS-SCNC: 8 MMOL/L (ref 7–15)
BASOPHILS # BLD AUTO: 0 10E3/UL (ref 0–0.2)
BASOPHILS NFR BLD AUTO: 0 %
BUN SERPL-MCNC: 46.2 MG/DL (ref 8–23)
CALCIUM SERPL-MCNC: 10.5 MG/DL (ref 8.8–10.2)
CHLORIDE SERPL-SCNC: 97 MMOL/L (ref 98–107)
CREAT SERPL-MCNC: 1.04 MG/DL (ref 0.51–0.95)
DEPRECATED HCO3 PLAS-SCNC: 33 MMOL/L (ref 22–29)
EGFRCR SERPLBLD CKD-EPI 2021: 55 ML/MIN/1.73M2
EOSINOPHIL # BLD AUTO: 0.2 10E3/UL (ref 0–0.7)
EOSINOPHIL NFR BLD AUTO: 2 %
ERYTHROCYTE [DISTWIDTH] IN BLOOD BY AUTOMATED COUNT: 15 % (ref 10–15)
GLUCOSE SERPL-MCNC: 228 MG/DL (ref 70–99)
HCT VFR BLD AUTO: 34.6 % (ref 35–47)
HGB BLD-MCNC: 10.9 G/DL (ref 11.7–15.7)
IMM GRANULOCYTES # BLD: 0 10E3/UL
IMM GRANULOCYTES NFR BLD: 0 %
LYMPHOCYTES # BLD AUTO: 1.1 10E3/UL (ref 0.8–5.3)
LYMPHOCYTES NFR BLD AUTO: 12 %
MCH RBC QN AUTO: 28.6 PG (ref 26.5–33)
MCHC RBC AUTO-ENTMCNC: 31.5 G/DL (ref 31.5–36.5)
MCV RBC AUTO: 91 FL (ref 78–100)
MONOCYTES # BLD AUTO: 0.7 10E3/UL (ref 0–1.3)
MONOCYTES NFR BLD AUTO: 7 %
NEUTROPHILS # BLD AUTO: 7.2 10E3/UL (ref 1.6–8.3)
NEUTROPHILS NFR BLD AUTO: 79 %
NRBC # BLD AUTO: 0 10E3/UL
NRBC BLD AUTO-RTO: 0 /100
PLATELET # BLD AUTO: 200 10E3/UL (ref 150–450)
POTASSIUM SERPL-SCNC: 4.5 MMOL/L (ref 3.4–5.3)
RBC # BLD AUTO: 3.81 10E6/UL (ref 3.8–5.2)
SODIUM SERPL-SCNC: 138 MMOL/L (ref 135–145)
WBC # BLD AUTO: 9.2 10E3/UL (ref 4–11)

## 2024-02-02 PROCEDURE — 99309 SBSQ NF CARE MODERATE MDM 30: CPT | Performed by: NURSE PRACTITIONER

## 2024-02-02 PROCEDURE — 85025 COMPLETE CBC W/AUTO DIFF WBC: CPT | Performed by: NURSE PRACTITIONER

## 2024-02-02 PROCEDURE — 36415 COLL VENOUS BLD VENIPUNCTURE: CPT | Performed by: NURSE PRACTITIONER

## 2024-02-02 PROCEDURE — 80048 BASIC METABOLIC PNL TOTAL CA: CPT | Performed by: NURSE PRACTITIONER

## 2024-02-02 RX ORDER — MINERAL OIL/HYDROPHIL PETROLAT
OINTMENT (GRAM) TOPICAL
Qty: 396 G | Refills: 1 | Status: SHIPPED | OUTPATIENT
Start: 2024-02-02 | End: 2024-02-23

## 2024-02-02 RX ORDER — TORSEMIDE 20 MG/1
40 TABLET ORAL DAILY
Qty: 60 TABLET | Refills: 11 | Status: SHIPPED | OUTPATIENT
Start: 2024-02-02 | End: 2024-02-09

## 2024-02-02 NOTE — LETTER
2/2/2024        RE: Linda Loredo  [address] [facility] [address]        [facility] GERIATRICS    Chief Complaint   Patient presents with     RECHECK     HPI:  Linda Loredo is a 78 year old  (1945), who is being seen today for an episodic care visit at: [facility] ([facility])([facility]) [[id number]]. Today's concern is: The primary encounter diagnosis was Other chronic osteomyelitis of left foot (H). Diagnoses of Diabetic ulcer of left heel associated with type 2 diabetes mellitus, with necrosis of bone (H), 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), Acute blood loss anemia, Venous stasis, Essential hypertension, Type 2 diabetes mellitus with diabetic polyneuropathy, with long-term current use of insulin (H), Dyslipidemia, PAD (peripheral artery disease) (H24), Persistent atrial fibrillation (H), Stage 3a chronic kidney disease (H), Primary hyperparathyroidism (H24), Chronic obstructive pulmonary disease, unspecified COPD type (H), Mild intermittent asthma without complication, Other chronic pain, Slow transit constipation, Severe obesity (BMI 35.0-39.9) with comorbidity (H), BERLIN (obstructive sleep apnea), Physical deconditioning, Paroxysmal atrial fibrillation (H), Warfarin-induced coagulopathy  (H24), Long term (current) use of anticoagulants, Opioid dependence, uncomplicated (H), Chronic right-sided heart failure (H), Severe obesity (H), Lymphedema, Status post debridement, Redness, Skin lesion, Major depressive disorder, recurrent episode, moderate (H), and Essential hypertension with goal blood pressure less than 140/90 were also pertinent to this visit.    Met with patient who denies any chest pain, palpitations, shortness of breath, RASHID, lightheadedness, dizziness, or cough. Denies any abdominal discomfort. Denies N&V. Denies B&B concerns. Denies dysuria or frequency. Denies loose or constipation. She reports now trialing  "using the toilet in bathroom versus bedside commode. Appetite good. Sleeping well. Blood Glucose values fluctuating--suspect stress contributing. She reports previously being followed by endocrine. Was previously on metformin but unknown why this stopped. She was instructed to contact endocrine to schedule appt soon when able, which she reports she will do. She was recently assessed for snf living, but per patient she was declined due to her weeping wound on RLE. RLE continues to weep. Historically noncompliant with compression. Now open to recommendations. She does report weeping has limited with current course, however still weeps. Redness worsens when no compression is applied. Mood stable, but often reports staff concerns  pain stable with current regimen. She reports torsemide medication was out yesterday therefore no dose was given. Weight gain noted.     BP Readings from Last 3 Encounters:   02/02/24 114/66   01/26/24 (!) 149/92   01/15/24 125/71     Wt Readings from Last 5 Encounters:   02/02/24 104.6 kg (230 lb 9.6 oz)   01/26/24 100.6 kg (221 lb 12.8 oz)   01/15/24 105 kg (231 lb 6.4 oz)   01/10/24 106.4 kg (234 lb 9.6 oz)   01/08/24 105.9 kg (233 lb 6.4 oz)     Allergies, and PMH/PSH reviewed in EPIC today.  REVIEW OF SYSTEMS:  4 point ROS including Respiratory, CV, GI and , other than that noted in the HPI,  is negative    Objective:   /66   Pulse 90   Temp 98.1  F (36.7  C)   Resp 18   Ht 1.676 m (5' 6\")   Wt 104.6 kg (230 lb 9.6 oz)   SpO2 98%   BMI 37.22 kg/m    GENERAL APPEARANCE:  Alert, in no distress, oriented, morbidly obese, cooperative  ENT:  Mouth and posterior oropharynx normal, moist mucous membranes, normal hearing acuity  EYES:  EOM, conjunctivae, lids, pupils and irises normal  NECK:  No adenopathy,masses or thyromegaly  RESP:  respiratory effort and palpation of chest normal, lungs clear to auscultation , no respiratory distress  CV:  Palpation and auscultation of heart " done , regular rate and rhythm, no murmur, rub, or gallop, peripheral edema 2-3+ in RLE  ABDOMEN:  normal bowel sounds, soft, nontender, no hepatosplenomegaly or other masses, no guarding or rebound  M/S:   Slide board transfers. Wheelchair bound.   SKIN:  wound healing well, no signs of infection see photos below  NEURO:   Cranial nerves 2-12 are normal tested and grossly at patient's baseline, no purposeful movement in upper and lower extremities  PSYCH:  oriented X 3, normal insight, judgement and memory, affect and mood normal                            Most Recent 3 CBC's:  Recent Labs   Lab Test 02/02/24  1043 12/27/23  0958 12/08/23  0600   WBC 9.2 8.0 7.5   HGB 10.9* 10.5* 9.6*   MCV 91 90 91    199 198     Most Recent 3 BMP's:  Recent Labs   Lab Test 01/02/24  1225 01/02/24  1136 01/02/24  0804 12/27/23  0958 12/08/23  1226   NA  --   --  136 137 137   POTASSIUM  --   --  4.1 4.5 4.6   CHLORIDE  --   --  94* 96* 98   CO2  --   --  34* 32* 30*   BUN  --   --  37.1* 40.4* 32.4*   CR  --   --  0.87 0.95 0.96*   ANIONGAP  --   --  8 9 9   ZAKIA  --   --  11.0* 10.6* 10.1   * 226* 232* 261* 185*     Most Recent 2 LFT's:  Recent Labs   Lab Test 11/17/23  1033 11/01/23  0613   AST 20 19   ALT 11 12   ALKPHOS 90 97   BILITOTAL 0.5 0.2     Most Recent 3 INR's:  Recent Labs   Lab Test 01/02/24  0857 12/08/23  1226 11/24/23  1012   INR 1.47* 2.73* 3.50*     Most Recent Hemoglobin A1c:  Recent Labs   Lab Test 01/02/24  0804   A1C 8.7*     Most Recent Anemia Panel:  Recent Labs   Lab Test 02/02/24  1043   WBC 9.2   HGB 10.9*   HCT 34.6*   MCV 91          ASSESSMENT/PLAN:  (E11.621,  L97.509,  Z79.4) Type 2 diabetes mellitus with foot ulcer, with long-term current use of insulin (H)  (E66.01) Obesity, Class III, BMI 40-49.9 (morbid obesity) (H)  Comment: Chronic. Last A1c 7% in sept 2023. Results on 1/2/24 was 8.7%. Goal <9%. Discussed history of being on metformin in the past. No longer on this and  she does not know why. I suspect it may be due to GUSTAVO history. She reports being followed by endocrine and is wanting to see them again.   Plan:   -Monitor Blood Glucose QID as directed  -Continue Novlog sliding scale high coverage TID and at HS  -Increase tresiba 30 units at HS  -Advised her to schedule follow up with endocrinology and staff to assist with scheduling transportation. Pending appt  -Obtain hgb A1c in 3 months. Due march 2024  -BMP, CBC and INR due 2/2/24 --results pending  -BMP and INR due 2/16/24               (I48.0) Paroxysmal atrial fibrillation (H)  (Z79.01) Long term (current) use of anticoagulants  (D68.32,  T45.515A) Warfarin-induced coagulopathy   (I10) Essential hypertension with goal blood pressure less than 140/90  (E78.2) Mixed hyperlipidemia  (N18.30) Stage 3 chronic kidney disease, unspecified whether stage 3a or 3b CKD (H)  (I50.812) Chronic right-sided heart failure (H)  Comment: Chronic. Baseline Creatinine~ 1-1.2. Based on JNC-8 goals,  patients age of 78 year old, presence of diabetes or CKD, and goals of care goal BP is  <140/90 mm Hg. Patient is stable with current plan of care and routine assessment..Holding lisinopril per chart review per patient wishes. INR goal 2-3. Last INR via fingerstick was 2.6 on 1/26/24. INR today via fingerstick 2.8  Plan:   -Monitor for worsening s/symptoms of concerns  -Monitor BP and HR as directed  -Continue asa 81mg daily  -Continue atorvastatin 40mg daily  -Continue coumadin 2mg daily  -Monitor weights as directed. Baseline weight around 222-224# on admission but noted highest weight noted to be 238#. Highest documented was 238#. Now recent weight 230#  -Increase torsemide to 40mg daily. HOLD if SBP<100   -BMP, CBC and INR due 2/2/24 --results pending  -BMP and INR due 2/16/24         (R60.0) Edema of right extremity  (L53.9) Redness  Comment: Acute on chronic. Noted increased redness to RLE with warmth and pain soon after admission. Currently  on coumadin. US negative for DVT risks. 4 areas areas are present to right leg. 3 anterior and 1 posterior (see photo above for reference). Suspect edema may also be contributing to wound concerns to RLE. Most areas are scabbed over except for left anterior shin area continues to weep clear fluid. No itch.   Plan:  -Lymphedema therapy to assess and assist with edema control---currently on hold for now per request  -Change wound care to RLE for now: Cleanse with normal saline. Apply bactroban to scabbed areas. Apply weeping area with betadine. Cover weeping area with calcium alignate then cover with folded ABD pad then wrap with kerlix. Ace wrap to secure in place. Perform BID and PRN until healed.    -Monitor for worsening s/symptoms of concerns   -BMP, CBC and INR due 2/2/24 --results pending  -BMP and INR due 2/16/24     (E11.621,  L97.425) Diabetic ulcer of left heel associated with type 2 diabetes mellitus, with muscle involvement without evidence of necrosis (H)  (Z89.512) 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)  (Z98.890) Status post debridement  (M86.9) Osteomyelitis of left foot, unspecified type (H)  (F11.20) Opioid dependence, uncomplicated (H)  (G89.4) Chronic pain syndrome  (I83.019,  I83.029,  L97.919,  L97.929) Venous stasis ulcers of both lower extremities (H)  (I89.0) Lymphedema  (Z94.5) History of skin graft placement on 1/2/24 as above  Comment: Chronic. S/p debridement 9/26/23. S/p Left guillotine BKA on 10/7/23. S/p tibial/fibular resection and debridement of stump 10/17/23. Followed by vascular. Completed course of meropenem/vancomycin 10/8 for osteomyelitis. Surgery completed 1/2/24 per review.   Plan:   -Monitor pain complaints  -Continue methadone 10mg daily at 1500 and 5mg TID scheduled.   -Tylenol PRN  -BMP, CBC and INR due 2/2/24 --results pending  -BMP and INR due 2/16/24  -Follow up with vascular as directed. Scheduled for telephone 2/7/24  -Aquaphor to thigh daily  and PRN  -For Graft site(BKA stump): Place Xeroform, followed by ABD, cover with spandage or stockinette. Perform every other day.    Electronically Signed by:  Dr. Haylie Rowe DNP, APRN, FNP-C, WCS-C, EDS-C         Face to Face and Medical Necessity Statement for DME Provider visit    Demographic Information on Linda Loredo:  Gender: female  : 1945  [address] [facility] [address]  [phone] (home)     Medical Record: [id number]  Social Security Number: [id number]  Primary Care Provider: Ish Brown  Insurance: Payor: [employer] / Plan: [employer] MEDICARE / Product Type: HMO /     HPI:   Linda Loredo is a 78 year old  (1945), who is being seen today for a face to face provider visit at [facility]; medical necessity statement for DME included. This patient requires the following:  DME Ordered and Medical Necessity Statement   Custom wheelchair per therapy documentation--see mobility device form with additional documentation per therapy team.    Wheelchair Documentation  Size: Per therapy recommendations  Corresponding cushion: Yes: per therapy recommendations  Standard foot rests:  per therapy recommendations  Elevating leg rests:  per therapy recommendations  Arm rests: Yes: per therapy recommendations  Lap tray: No  Dose the patient use oxygen? No   Is the patient able to propel wheelchair? Yes   1. The patient has mobility limitations that impairs their ability to participate in one or more mobility related activities: Toileting, Grooming, and Bathing.  The wheelchair is suitable and necessary for use in the patient's home.  2. The patient's mobility limitations cannot be safely resolved by using a cane/walker:Yes    Reason why a cane or walker will not meet the patient's needs. (ie: balance, tolerance, level of assistance) AKA of left leg. Nonambulatory status  3. The patients home has adequate access to use a manual  wheelchair:Yes  4. The use of a manual wheelchair on a regular basis will improve the patients ability to participate in mobility related ADL's at home:Yes  5. The patient is willing to use a manual wheelchair at home:Yes  6. The patient has adequate upper body strength and the mental capability to safely use a manual wheelchair and/or has a caregiver that is able to assist: Yes  7. Does the patient have a lower extremity injury or edema?Yes  Reason for Type of Wheelchair  Wt Readings from Last 5 Encounters:   02/02/24 104.6 kg (230 lb 9.6 oz)   01/26/24 100.6 kg (221 lb 12.8 oz)   01/15/24 105 kg (231 lb 6.4 oz)   01/10/24 106.4 kg (234 lb 9.6 oz)   01/08/24 105.9 kg (233 lb 6.4 oz)     Pt needing above DME with expected length of need of 99 year  due to medical necessity associated with following diagnosis:     Other chronic osteomyelitis of left foot (H)  Diabetic ulcer of left heel associated with type 2 diabetes mellitus, with necrosis of bone (H)  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)  Acute blood loss anemia  Venous stasis  Essential hypertension  Type 2 diabetes mellitus with diabetic polyneuropathy, with long-term current use of insulin (H)  Dyslipidemia  PAD (peripheral artery disease) (H24)  Persistent atrial fibrillation (H)  Stage 3a chronic kidney disease (H)  Primary hyperparathyroidism (H24)  Chronic obstructive pulmonary disease, unspecified COPD type (H)  Mild intermittent asthma without complication  Other chronic pain  Slow transit constipation  Severe obesity (BMI 35.0-39.9) with comorbidity (H)  BERLIN (obstructive sleep apnea)  Physical deconditioning  Paroxysmal atrial fibrillation (H)  Warfarin-induced coagulopathy  (H24)  Long term (current) use of anticoagulants  Opioid dependence, uncomplicated (H)  Chronic right-sided heart failure (H)  Severe obesity (H)  Lymphedema  Status post debridement  Redness  Skin lesion  Major depressive disorder, recurrent episode,  "moderate (H)  Essential hypertension with goal blood pressure less than 140/90      PMH   has a past medical history of Depressive disorder, not elsewhere classified, Herpes zoster without mention of complication, Hypercalcemia (2/24/2007), Mild intermittent asthma, Other urinary incontinence, Type II or unspecified type diabetes mellitus with renal manifestations, uncontrolled(250.42) (H), Type II or unspecified type diabetes mellitus without mention of complication, not stated as uncontrolled, and Unspecified essential hypertension.    ROS:4 point ROS including Respiratory, CV, GI and , other than that noted in the HPI,  is negative    EXAM  /66   Pulse 90   Temp 98.1  F (36.7  C)   Resp 18   Ht 1.676 m (5' 6\")   Wt 104.6 kg (230 lb 9.6 oz)   SpO2 98%   BMI 37.22 kg/m    GENERAL APPEARANCE:  Alert, in no distress, oriented, morbidly obese, cooperative  ENT:  Mouth and posterior oropharynx normal, moist mucous membranes, normal hearing acuity  EYES:  EOM, conjunctivae, lids, pupils and irises normal  NECK:  No adenopathy,masses or thyromegaly  RESP:  respiratory effort and palpation of chest normal, lungs clear to auscultation , no respiratory distress  CV:  Palpation and auscultation of heart done , regular rate and rhythm, no murmur, rub, or gallop, peripheral edema 2-3+ in RLE  ABDOMEN:  normal bowel sounds, soft, nontender, no hepatosplenomegaly or other masses, no guarding or rebound  M/S:   Slide board transfers. Wheelchair bound.   SKIN:  wound healing well, no signs of infection see photos below  NEURO:   Cranial nerves 2-12 are normal tested and grossly at patient's baseline, no purposeful movement in upper and lower extremities  PSYCH:  oriented X 3, normal insight, judgement and memory, affect and mood normal      ASSESSMENT/PLAN:  1. Other chronic osteomyelitis of left foot (H)    2. Diabetic ulcer of left heel associated with type 2 diabetes mellitus, with necrosis of bone (H)    3. " 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)    4. Acute blood loss anemia    5. Venous stasis    6. Essential hypertension    7. Type 2 diabetes mellitus with diabetic polyneuropathy, with long-term current use of insulin (H)    8. Dyslipidemia    9. PAD (peripheral artery disease) (H24)    10. Persistent atrial fibrillation (H)    11. Stage 3a chronic kidney disease (H)    12. Primary hyperparathyroidism (H24)    13. Chronic obstructive pulmonary disease, unspecified COPD type (H)    14. Mild intermittent asthma without complication    15. Other chronic pain    16. Slow transit constipation    17. Severe obesity (BMI 35.0-39.9) with comorbidity (H)    18. BERLIN (obstructive sleep apnea)    19. Physical deconditioning    20. Paroxysmal atrial fibrillation (H)    21. Warfarin-induced coagulopathy  (H24)    22. Long term (current) use of anticoagulants    23. Opioid dependence, uncomplicated (H)    24. Chronic right-sided heart failure (H)    25. Severe obesity (H)    26. Lymphedema    27. Status post debridement    28. Redness    29. Skin lesion    30. Major depressive disorder, recurrent episode, moderate (H)    31. Essential hypertension with goal blood pressure less than 140/90        Orders:  1. Facility staff/TC to contact DME company to get their order form for provider to fill out    ELECTRONICALLY SIGNED BY ROJELIO CERTIFIED PROVIDER:  MOE Fitch CNP   NPI: [id number]  [facility]  [address] [place] [address], [address]  [place] [address]               Sincerely,        MOE Fitch CNP

## 2024-02-02 NOTE — LETTER
2/2/2024        RE: Linda Loredo  [address] [facility] [address]        [facility] GERIATRICS    Chief Complaint   Patient presents with     RECHECK     HPI:  Linda Loredo is a 78 year old  (1945), who is being seen today for an episodic care visit at: [facility] ([facility]) [[id number]]. Today's concern is: The primary encounter diagnosis was Other chronic osteomyelitis of left foot (H). Diagnoses of Diabetic ulcer of left heel associated with type 2 diabetes mellitus, with necrosis of bone (H), 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), Acute blood loss anemia, Venous stasis, Essential hypertension, Type 2 diabetes mellitus with diabetic polyneuropathy, with long-term current use of insulin (H), Dyslipidemia, PAD (peripheral artery disease) (H24), Persistent atrial fibrillation (H), Stage 3a chronic kidney disease (H), Primary hyperparathyroidism (H24), Chronic obstructive pulmonary disease, unspecified COPD type (H), Mild intermittent asthma without complication, Other chronic pain, Slow transit constipation, Severe obesity (BMI 35.0-39.9) with comorbidity (H), BERLIN (obstructive sleep apnea), Physical deconditioning, Paroxysmal atrial fibrillation (H), Warfarin-induced coagulopathy  (H24), Long term (current) use of anticoagulants, Opioid dependence, uncomplicated (H), Chronic right-sided heart failure (H), Severe obesity (H), Lymphedema, Status post debridement, Redness, Skin lesion, Major depressive disorder, recurrent episode, moderate (H), and Essential hypertension with goal blood pressure less than 140/90 were also pertinent to this visit.    Met with patient who denies any chest pain, palpitations, shortness of breath, RASHID, lightheadedness, dizziness, or cough. Denies any abdominal discomfort. Denies N&V. Denies B&B concerns. Denies dysuria or frequency. Denies loose or constipation. She reports now trialing  "using the toilet in bathroom versus bedside commode. Appetite good. Sleeping well. Blood Glucose values fluctuating--suspect stress contributing. She reports previously being followed by endocrine. Was previously on metformin but unknown why this stopped. She was instructed to contact endocrine to schedule appt soon when able, which she reports she will do. She was recently assessed for shelter living, but per patient she was declined due to her weeping wound on RLE. RLE continues to weep. Historically noncompliant with compression. Now open to recommendations. She does report weeping has limited with current course, however still weeps. Redness worsens when no compression is applied. Mood stable, but often reports staff concerns  pain stable with current regimen. She reports torsemide medication was out yesterday therefore no dose was given. Weight gain noted.     BP Readings from Last 3 Encounters:   02/02/24 114/66   01/26/24 (!) 149/92   01/15/24 125/71     Wt Readings from Last 5 Encounters:   02/02/24 104.6 kg (230 lb 9.6 oz)   01/26/24 100.6 kg (221 lb 12.8 oz)   01/15/24 105 kg (231 lb 6.4 oz)   01/10/24 106.4 kg (234 lb 9.6 oz)   01/08/24 105.9 kg (233 lb 6.4 oz)     Allergies, and PMH/PSH reviewed in EPIC today.  REVIEW OF SYSTEMS:  4 point ROS including Respiratory, CV, GI and , other than that noted in the HPI,  is negative    Objective:   /66   Pulse 90   Temp 98.1  F (36.7  C)   Resp 18   Ht 1.676 m (5' 6\")   Wt 104.6 kg (230 lb 9.6 oz)   SpO2 98%   BMI 37.22 kg/m    GENERAL APPEARANCE:  Alert, in no distress, oriented, morbidly obese, cooperative  ENT:  Mouth and posterior oropharynx normal, moist mucous membranes, normal hearing acuity  EYES:  EOM, conjunctivae, lids, pupils and irises normal  NECK:  No adenopathy,masses or thyromegaly  RESP:  respiratory effort and palpation of chest normal, lungs clear to auscultation , no respiratory distress  CV:  Palpation and auscultation of heart " done , regular rate and rhythm, no murmur, rub, or gallop, peripheral edema 2-3+ in RLE  ABDOMEN:  normal bowel sounds, soft, nontender, no hepatosplenomegaly or other masses, no guarding or rebound  M/S:   Slide board transfers. Wheelchair bound.   SKIN:  wound healing well, no signs of infection see photos below  NEURO:   Cranial nerves 2-12 are normal tested and grossly at patient's baseline, no purposeful movement in upper and lower extremities  PSYCH:  oriented X 3, normal insight, judgement and memory, affect and mood normal                            Most Recent 3 CBC's:  Recent Labs   Lab Test 02/02/24  1043 12/27/23  0958 12/08/23  0600   WBC 9.2 8.0 7.5   HGB 10.9* 10.5* 9.6*   MCV 91 90 91    199 198     Most Recent 3 BMP's:  Recent Labs   Lab Test 01/02/24  1225 01/02/24  1136 01/02/24  0804 12/27/23  0958 12/08/23  1226   NA  --   --  136 137 137   POTASSIUM  --   --  4.1 4.5 4.6   CHLORIDE  --   --  94* 96* 98   CO2  --   --  34* 32* 30*   BUN  --   --  37.1* 40.4* 32.4*   CR  --   --  0.87 0.95 0.96*   ANIONGAP  --   --  8 9 9   ZAKIA  --   --  11.0* 10.6* 10.1   * 226* 232* 261* 185*     Most Recent 2 LFT's:  Recent Labs   Lab Test 11/17/23  1033 11/01/23  0613   AST 20 19   ALT 11 12   ALKPHOS 90 97   BILITOTAL 0.5 0.2     Most Recent 3 INR's:  Recent Labs   Lab Test 01/02/24  0857 12/08/23  1226 11/24/23  1012   INR 1.47* 2.73* 3.50*     Most Recent Hemoglobin A1c:  Recent Labs   Lab Test 01/02/24  0804   A1C 8.7*     Most Recent Anemia Panel:  Recent Labs   Lab Test 02/02/24  1043   WBC 9.2   HGB 10.9*   HCT 34.6*   MCV 91          ASSESSMENT/PLAN:  (E11.621,  L97.509,  Z79.4) Type 2 diabetes mellitus with foot ulcer, with long-term current use of insulin (H)  (E66.01) Obesity, Class III, BMI 40-49.9 (morbid obesity) (H)  Comment: Chronic. Last A1c 7% in sept 2023. Results on 1/2/24 was 8.7%. Goal <9%. Discussed history of being on metformin in the past. No longer on this and  she does not know why. I suspect it may be due to GUSTAVO history. She reports being followed by endocrine and is wanting to see them again.   Plan:   -Monitor Blood Glucose QID as directed  -Continue Novlog sliding scale high coverage TID and at HS  -Increase tresiba 30 units at HS  -Advised her to schedule follow up with endocrinology and staff to assist with scheduling transportation. Pending appt  -Obtain hgb A1c in 3 months. Due march 2024  -BMP, CBC and INR due 2/2/24 --results pending  -BMP and INR due 2/16/24               (I48.0) Paroxysmal atrial fibrillation (H)  (Z79.01) Long term (current) use of anticoagulants  (D68.32,  T45.515A) Warfarin-induced coagulopathy   (I10) Essential hypertension with goal blood pressure less than 140/90  (E78.2) Mixed hyperlipidemia  (N18.30) Stage 3 chronic kidney disease, unspecified whether stage 3a or 3b CKD (H)  (I50.812) Chronic right-sided heart failure (H)  Comment: Chronic. Baseline Creatinine~ 1-1.2. Based on JNC-8 goals,  patients age of 78 year old, presence of diabetes or CKD, and goals of care goal BP is  <140/90 mm Hg. Patient is stable with current plan of care and routine assessment..Holding lisinopril per chart review per patient wishes. INR goal 2-3. Last INR via fingerstick was 2.6 on 1/26/24. INR today via fingerstick 2.8  Plan:   -Monitor for worsening s/symptoms of concerns  -Monitor BP and HR as directed  -Continue asa 81mg daily  -Continue atorvastatin 40mg daily  -Continue coumadin 2mg daily  -Monitor weights as directed. Baseline weight around 222-224# on admission but noted highest weight noted to be 238#. Highest documented was 238#. Now recent weight 230#  -Increase torsemide to 40mg daily. HOLD if SBP<100   -BMP, CBC and INR due 2/2/24 --results pending  -BMP and INR due 2/16/24         (R60.0) Edema of right extremity  (L53.9) Redness  Comment: Acute on chronic. Noted increased redness to RLE with warmth and pain soon after admission. Currently  on coumadin. US negative for DVT risks. 4 areas areas are present to right leg. 3 anterior and 1 posterior (see photo above for reference). Suspect edema may also be contributing to wound concerns to RLE. Most areas are scabbed over except for left anterior shin area continues to weep clear fluid. No itch.   Plan:  -Lymphedema therapy to assess and assist with edema control---currently on hold for now per request  -Change wound care to RLE for now: Cleanse with normal saline. Apply bactroban to scabbed areas. Apply weeping area with betadine. Cover weeping area with calcium alignate then cover with folded ABD pad then wrap with kerlix. Ace wrap to secure in place. Perform BID and PRN until healed.    -Monitor for worsening s/symptoms of concerns   -BMP, CBC and INR due 2/2/24 --results pending  -BMP and INR due 2/16/24     (E11.621,  L97.425) Diabetic ulcer of left heel associated with type 2 diabetes mellitus, with muscle involvement without evidence of necrosis (H)  (Z89.512) 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)  (Z98.890) Status post debridement  (M86.9) Osteomyelitis of left foot, unspecified type (H)  (F11.20) Opioid dependence, uncomplicated (H)  (G89.4) Chronic pain syndrome  (I83.019,  I83.029,  L97.919,  L97.929) Venous stasis ulcers of both lower extremities (H)  (I89.0) Lymphedema  (Z94.5) History of skin graft placement on 1/2/24 as above  Comment: Chronic. S/p debridement 9/26/23. S/p Left guillotine BKA on 10/7/23. S/p tibial/fibular resection and debridement of stump 10/17/23. Followed by vascular. Completed course of meropenem/vancomycin 10/8 for osteomyelitis. Surgery completed 1/2/24 per review.   Plan:   -Monitor pain complaints  -Continue methadone 10mg daily at 1500 and 5mg TID scheduled.   -Tylenol PRN  -BMP, CBC and INR due 2/2/24 --results pending  -BMP and INR due 2/16/24  -Follow up with vascular as directed. Scheduled for telephone 2/7/24  -Aquaphor to thigh daily  and PRN  -For Graft site(BKA stump): Place Xeroform, followed by ABD, cover with spandage or stockinette. Perform every other day.    Electronically Signed by:  Dr. Haylie Rowe DNP, APRN, FNP-C, WCS-C, EDS-C         Face to Face and Medical Necessity Statement for DME Provider visit    Demographic Information on Linda Loredo:  Gender: female  : 1945  [address] [facility] [address]  [phone] (home)     Medical Record: [id number]  Social Security Number: [id number]  Primary Care Provider: Ish Brown  Insurance: Payor: [employer] / Plan: [employer] MEDICARE / Product Type: HMO /     HPI:   Linda Loredo is a 78 year old  (1945), who is being seen today for a face to face provider visit at [facility]; medical necessity statement for DME included. This patient requires the following:  DME Ordered and Medical Necessity Statement   Custom wheelchair per therapy documentation--see mobility device form with additional documentation per therapy team.    Wheelchair Documentation  Size: Per therapy recommendations  Corresponding cushion: Yes: per therapy recommendations  Standard foot rests:  per therapy recommendations  Elevating leg rests:  per therapy recommendations  Arm rests: Yes: per therapy recommendations  Lap tray: No  Dose the patient use oxygen? No   Is the patient able to propel wheelchair? Yes   1. The patient has mobility limitations that impairs their ability to participate in one or more mobility related activities: Toileting, Grooming, and Bathing.  The wheelchair is suitable and necessary for use in the patient's home.  2. The patient's mobility limitations cannot be safely resolved by using a cane/walker:Yes    Reason why a cane or walker will not meet the patient's needs. (ie: balance, tolerance, level of assistance) AKA of left leg. Nonambulatory status  3. The patients home has adequate access to use a manual  wheelchair:Yes  4. The use of a manual wheelchair on a regular basis will improve the patients ability to participate in mobility related ADL's at home:Yes  5. The patient is willing to use a manual wheelchair at home:Yes  6. The patient has adequate upper body strength and the mental capability to safely use a manual wheelchair and/or has a caregiver that is able to assist: Yes  7. Does the patient have a lower extremity injury or edema?Yes  Reason for Type of Wheelchair  Wt Readings from Last 5 Encounters:   02/02/24 104.6 kg (230 lb 9.6 oz)   01/26/24 100.6 kg (221 lb 12.8 oz)   01/15/24 105 kg (231 lb 6.4 oz)   01/10/24 106.4 kg (234 lb 9.6 oz)   01/08/24 105.9 kg (233 lb 6.4 oz)     Pt needing above DME with expected length of need of 99 year  due to medical necessity associated with following diagnosis:     Other chronic osteomyelitis of left foot (H)  Diabetic ulcer of left heel associated with type 2 diabetes mellitus, with necrosis of bone (H)  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)  Acute blood loss anemia  Venous stasis  Essential hypertension  Type 2 diabetes mellitus with diabetic polyneuropathy, with long-term current use of insulin (H)  Dyslipidemia  PAD (peripheral artery disease) (H24)  Persistent atrial fibrillation (H)  Stage 3a chronic kidney disease (H)  Primary hyperparathyroidism (H24)  Chronic obstructive pulmonary disease, unspecified COPD type (H)  Mild intermittent asthma without complication  Other chronic pain  Slow transit constipation  Severe obesity (BMI 35.0-39.9) with comorbidity (H)  BERLIN (obstructive sleep apnea)  Physical deconditioning  Paroxysmal atrial fibrillation (H)  Warfarin-induced coagulopathy  (H24)  Long term (current) use of anticoagulants  Opioid dependence, uncomplicated (H)  Chronic right-sided heart failure (H)  Severe obesity (H)  Lymphedema  Status post debridement  Redness  Skin lesion  Major depressive disorder, recurrent episode,  "moderate (H)  Essential hypertension with goal blood pressure less than 140/90      PMH   has a past medical history of Depressive disorder, not elsewhere classified, Herpes zoster without mention of complication, Hypercalcemia (2/24/2007), Mild intermittent asthma, Other urinary incontinence, Type II or unspecified type diabetes mellitus with renal manifestations, uncontrolled(250.42) (H), Type II or unspecified type diabetes mellitus without mention of complication, not stated as uncontrolled, and Unspecified essential hypertension.    ROS:4 point ROS including Respiratory, CV, GI and , other than that noted in the HPI,  is negative    EXAM  /66   Pulse 90   Temp 98.1  F (36.7  C)   Resp 18   Ht 1.676 m (5' 6\")   Wt 104.6 kg (230 lb 9.6 oz)   SpO2 98%   BMI 37.22 kg/m    GENERAL APPEARANCE:  Alert, in no distress, oriented, morbidly obese, cooperative  ENT:  Mouth and posterior oropharynx normal, moist mucous membranes, normal hearing acuity  EYES:  EOM, conjunctivae, lids, pupils and irises normal  NECK:  No adenopathy,masses or thyromegaly  RESP:  respiratory effort and palpation of chest normal, lungs clear to auscultation , no respiratory distress  CV:  Palpation and auscultation of heart done , regular rate and rhythm, no murmur, rub, or gallop, peripheral edema 2-3+ in RLE  ABDOMEN:  normal bowel sounds, soft, nontender, no hepatosplenomegaly or other masses, no guarding or rebound  M/S:   Slide board transfers. Wheelchair bound.   SKIN:  wound healing well, no signs of infection see photos below  NEURO:   Cranial nerves 2-12 are normal tested and grossly at patient's baseline, no purposeful movement in upper and lower extremities  PSYCH:  oriented X 3, normal insight, judgement and memory, affect and mood normal      ASSESSMENT/PLAN:  1. Other chronic osteomyelitis of left foot (H)    2. Diabetic ulcer of left heel associated with type 2 diabetes mellitus, with necrosis of bone (H)    3. " 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)    4. Acute blood loss anemia    5. Venous stasis    6. Essential hypertension    7. Type 2 diabetes mellitus with diabetic polyneuropathy, with long-term current use of insulin (H)    8. Dyslipidemia    9. PAD (peripheral artery disease) (H24)    10. Persistent atrial fibrillation (H)    11. Stage 3a chronic kidney disease (H)    12. Primary hyperparathyroidism (H24)    13. Chronic obstructive pulmonary disease, unspecified COPD type (H)    14. Mild intermittent asthma without complication    15. Other chronic pain    16. Slow transit constipation    17. Severe obesity (BMI 35.0-39.9) with comorbidity (H)    18. BERLIN (obstructive sleep apnea)    19. Physical deconditioning    20. Paroxysmal atrial fibrillation (H)    21. Warfarin-induced coagulopathy  (H24)    22. Long term (current) use of anticoagulants    23. Opioid dependence, uncomplicated (H)    24. Chronic right-sided heart failure (H)    25. Severe obesity (H)    26. Lymphedema    27. Status post debridement    28. Redness    29. Skin lesion    30. Major depressive disorder, recurrent episode, moderate (H)    31. Essential hypertension with goal blood pressure less than 140/90        Orders:  1. Facility staff/TC to contact DME company to get their order form for provider to fill out    ELECTRONICALLY SIGNED BY ROJELIO CERTIFIED PROVIDER:  MOE Fitch CNP   NPI: [id number]  [facility]  [address] [place] [address], [address]  [place] [address]               Sincerely,        MOE Fitch CNP

## 2024-02-02 NOTE — LETTER
2/2/2024        RE: Linda Loredo  [address] [facility] [address]        [facility] GERIATR[facility]    Chief Complaint   Patient presents with     RECHECK     HPI:  Linda Loredo is a 78 year old  (1945), who is being seen today for an episodic care visit at: [facility] ([facility])([facility]) [[id number]]. Today's concern is: The primary encounter diagnosis was Other chronic osteomyelitis of left foot (H). Diagnoses of Diabetic ulcer of left heel associated with type 2 diabetes mellitus, with necrosis of bone (H), 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), Acute blood loss anemia, Venous stasis, Essential hypertension, Type 2 diabetes mellitus with diabetic polyneuropathy, with long-term current use of insulin (H), Dyslipidemia, PAD (peripheral artery disease) (H24), Persistent atrial fibrillation (H), Stage 3a chronic kidney disease (H), Primary hyperparathyroidism (H24), Chronic obstructive pulmonary disease, unspecified COPD type (H), Mild intermittent asthma without complication, Other chronic pain, Slow transit constipation, Severe obesity (BMI 35.0-39.9) with comorbidity (H), BERLIN (obstructive sleep apnea), Physical deconditioning, Paroxysmal atrial fibrillation (H), Warfarin-induced coagulopathy  (H24), Long term (current) use of anticoagulants, Opioid dependence, uncomplicated (H), Chronic right-sided heart failure (H), Severe obesity (H), Lymphedema, Status post debridement, Redness, Skin lesion, Major depressive disorder, recurrent episode, moderate (H), and Essential hypertension with goal blood pressure less than 140/90 were also pertinent to this visit.    Met with patient who denies any chest pain, palpitations, shortness of breath, RASHID, lightheadedness, dizziness, or cough. Denies any abdominal discomfort. Denies N&V. Denies B&B concerns. Denies dysuria or frequency. Denies loose or constipation. She reports now trialing  "using the toilet in bathroom versus bedside commode. Appetite good. Sleeping well. Blood Glucose values fluctuating--suspect stress contributing. She reports previously being followed by endocrine. Was previously on metformin but unknown why this stopped. She was instructed to contact endocrine to schedule appt soon when able, which she reports she will do. She was recently assessed for assisted living, but per patient she was declined due to her weeping wound on RLE. RLE continues to weep. Historically noncompliant with compression. Now open to recommendations. She does report weeping has limited with current course, however still weeps. Redness worsens when no compression is applied. Mood stable, but often reports staff concerns  pain stable with current regimen. She reports torsemide medication was out yesterday therefore no dose was given. Weight gain noted.     BP Readings from Last 3 Encounters:   02/02/24 114/66   01/26/24 (!) 149/92   01/15/24 125/71     Wt Readings from Last 5 Encounters:   02/02/24 104.6 kg (230 lb 9.6 oz)   01/26/24 100.6 kg (221 lb 12.8 oz)   01/15/24 105 kg (231 lb 6.4 oz)   01/10/24 106.4 kg (234 lb 9.6 oz)   01/08/24 105.9 kg (233 lb 6.4 oz)     Allergies, and PMH/PSH reviewed in EPIC today.  REVIEW OF SYSTEMS:  4 point ROS including Respiratory, CV, GI and , other than that noted in the HPI,  is negative    Objective:   /66   Pulse 90   Temp 98.1  F (36.7  C)   Resp 18   Ht 1.676 m (5' 6\")   Wt 104.6 kg (230 lb 9.6 oz)   SpO2 98%   BMI 37.22 kg/m    GENERAL APPEARANCE:  Alert, in no distress, oriented, morbidly obese, cooperative  ENT:  Mouth and posterior oropharynx normal, moist mucous membranes, normal hearing acuity  EYES:  EOM, conjunctivae, lids, pupils and irises normal  NECK:  No adenopathy,masses or thyromegaly  RESP:  respiratory effort and palpation of chest normal, lungs clear to auscultation , no respiratory distress  CV:  Palpation and auscultation of heart " done , regular rate and rhythm, no murmur, rub, or gallop, peripheral edema 2-3+ in RLE  ABDOMEN:  normal bowel sounds, soft, nontender, no hepatosplenomegaly or other masses, no guarding or rebound  M/S:   Slide board transfers. Wheelchair bound.   SKIN:  wound healing well, no signs of infection see photos below  NEURO:   Cranial nerves 2-12 are normal tested and grossly at patient's baseline, no purposeful movement in upper and lower extremities  PSYCH:  oriented X 3, normal insight, judgement and memory, affect and mood normal                            Most Recent 3 CBC's:  Recent Labs   Lab Test 02/02/24  1043 12/27/23  0958 12/08/23  0600   WBC 9.2 8.0 7.5   HGB 10.9* 10.5* 9.6*   MCV 91 90 91    199 198     Most Recent 3 BMP's:  Recent Labs   Lab Test 01/02/24  1225 01/02/24  1136 01/02/24  0804 12/27/23  0958 12/08/23  1226   NA  --   --  136 137 137   POTASSIUM  --   --  4.1 4.5 4.6   CHLORIDE  --   --  94* 96* 98   CO2  --   --  34* 32* 30*   BUN  --   --  37.1* 40.4* 32.4*   CR  --   --  0.87 0.95 0.96*   ANIONGAP  --   --  8 9 9   ZAKIA  --   --  11.0* 10.6* 10.1   * 226* 232* 261* 185*     Most Recent 2 LFT's:  Recent Labs   Lab Test 11/17/23  1033 11/01/23  0613   AST 20 19   ALT 11 12   ALKPHOS 90 97   BILITOTAL 0.5 0.2     Most Recent 3 INR's:  Recent Labs   Lab Test 01/02/24  0857 12/08/23  1226 11/24/23  1012   INR 1.47* 2.73* 3.50*     Most Recent Hemoglobin A1c:  Recent Labs   Lab Test 01/02/24  0804   A1C 8.7*     Most Recent Anemia Panel:  Recent Labs   Lab Test 02/02/24  1043   WBC 9.2   HGB 10.9*   HCT 34.6*   MCV 91          ASSESSMENT/PLAN:  (E11.621,  L97.509,  Z79.4) Type 2 diabetes mellitus with foot ulcer, with long-term current use of insulin (H)  (E66.01) Obesity, Class III, BMI 40-49.9 (morbid obesity) (H)  Comment: Chronic. Last A1c 7% in sept 2023. Results on 1/2/24 was 8.7%. Goal <9%. Discussed history of being on metformin in the past. No longer on this and  she does not know why. I suspect it may be due to GUSTAVO history. She reports being followed by endocrine and is wanting to see them again.   Plan:   -Monitor Blood Glucose QID as directed  -Continue Novlog sliding scale high coverage TID and at HS  -Increase tresiba 30 units at HS  -Advised her to schedule follow up with endocrinology and staff to assist with scheduling transportation. Pending appt  -Obtain hgb A1c in 3 months. Due march 2024  -BMP, CBC and INR due 2/2/24 --results pending  -BMP and INR due 2/16/24               (I48.0) Paroxysmal atrial fibrillation (H)  (Z79.01) Long term (current) use of anticoagulants  (D68.32,  T45.515A) Warfarin-induced coagulopathy   (I10) Essential hypertension with goal blood pressure less than 140/90  (E78.2) Mixed hyperlipidemia  (N18.30) Stage 3 chronic kidney disease, unspecified whether stage 3a or 3b CKD (H)  (I50.812) Chronic right-sided heart failure (H)  Comment: Chronic. Baseline Creatinine~ 1-1.2. Based on JNC-8 goals,  patients age of 78 year old, presence of diabetes or CKD, and goals of care goal BP is  <140/90 mm Hg. Patient is stable with current plan of care and routine assessment..Holding lisinopril per chart review per patient wishes. INR goal 2-3. Last INR via fingerstick was 2.6 on 1/26/24. INR today via fingerstick 2.8  Plan:   -Monitor for worsening s/symptoms of concerns  -Monitor BP and HR as directed  -Continue asa 81mg daily  -Continue atorvastatin 40mg daily  -Continue coumadin 2mg daily  -Monitor weights as directed. Baseline weight around 222-224# on admission but noted highest weight noted to be 238#. Highest documented was 238#. Now recent weight 230#  -Increase torsemide to 40mg daily. HOLD if SBP<100   -BMP, CBC and INR due 2/2/24 --results pending  -BMP and INR due 2/16/24         (R60.0) Edema of right extremity  (L53.9) Redness  Comment: Acute on chronic. Noted increased redness to RLE with warmth and pain soon after admission. Currently  on coumadin. US negative for DVT risks. 4 areas areas are present to right leg. 3 anterior and 1 posterior (see photo above for reference). Suspect edema may also be contributing to wound concerns to RLE. Most areas are scabbed over except for left anterior shin area continues to weep clear fluid. No itch.   Plan:  -Lymphedema therapy to assess and assist with edema control---currently on hold for now per request  -Change wound care to RLE for now: Cleanse with normal saline. Apply bactroban to scabbed areas. Apply weeping area with betadine. Cover weeping area with calcium alignate then cover with folded ABD pad then wrap with kerlix. Ace wrap to secure in place. Perform BID and PRN until healed.    -Monitor for worsening s/symptoms of concerns   -BMP, CBC and INR due 2/2/24 --results pending  -BMP and INR due 2/16/24     (E11.621,  L97.425) Diabetic ulcer of left heel associated with type 2 diabetes mellitus, with muscle involvement without evidence of necrosis (H)  (Z89.512) 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)  (Z98.890) Status post debridement  (M86.9) Osteomyelitis of left foot, unspecified type (H)  (F11.20) Opioid dependence, uncomplicated (H)  (G89.4) Chronic pain syndrome  (I83.019,  I83.029,  L97.919,  L97.929) Venous stasis ulcers of both lower extremities (H)  (I89.0) Lymphedema  (Z94.5) History of skin graft placement on 1/2/24 as above  Comment: Chronic. S/p debridement 9/26/23. S/p Left guillotine BKA on 10/7/23. S/p tibial/fibular resection and debridement of stump 10/17/23. Followed by vascular. Completed course of meropenem/vancomycin 10/8 for osteomyelitis. Surgery completed 1/2/24 per review.   Plan:   -Monitor pain complaints  -Continue methadone 10mg daily at 1500 and 5mg TID scheduled.   -Tylenol PRN  -BMP, CBC and INR due 2/2/24 --results pending  -BMP and INR due 2/16/24  -Follow up with vascular as directed. Scheduled for telephone 2/7/24  -Aquaphor to thigh daily  and PRN  -For Graft site(BKA stump): Place Xeroform, followed by ABD, cover with spandage or stockinette. Perform every other day.    Electronically Signed by:  Dr. Haylie Rowe DNP, APRN, FNP-C, WCS-C, EDS-C         Face to Face and Medical Necessity Statement for DME Provider visit    Demographic Information on Linda Loredo:  Gender: female  : 1945  [address] [facility] [address]  [phone] (home)     Medical Record: [id number]  Social Security Number: [id number]  Primary Care Provider: Ish Brown  Insurance: Payor: [employer] / Plan: UCARE MEDICARE / Product Type: HMO /     HPI:   Linda Loredo is a 78 year old  (1945), who is being seen today for a face to face provider visit at [facility] T[facility]; medical necessity statement for DME included. This patient requires the following:  DME Ordered and Medical Necessity Statement   Custom wheelchair per therapy documentation--see mobility device form with additional documentation per therapy team.    Wheelchair Documentation  Size: Per therapy recommendations  Corresponding cushion: Yes: per therapy recommendations  Standard foot rests:  per therapy recommendations  Elevating leg rests:  per therapy recommendations  Arm rests: Yes: per therapy recommendations  Lap tray: No  Dose the patient use oxygen? No   Is the patient able to propel wheelchair? Yes   1. The patient has mobility limitations that impairs their ability to participate in one or more mobility related activities: Toileting, Grooming, and Bathing.  The wheelchair is suitable and necessary for use in the patient's home.  2. The patient's mobility limitations cannot be safely resolved by using a cane/walker:Yes    Reason why a cane or walker will not meet the patient's needs. (ie: balance, tolerance, level of assistance) AKA of left leg. Nonambulatory status  3. The patients home has adequate access to use a manual  wheelchair:Yes  4. The use of a manual wheelchair on a regular basis will improve the patients ability to participate in mobility related ADL's at home:Yes  5. The patient is willing to use a manual wheelchair at home:Yes  6. The patient has adequate upper body strength and the mental capability to safely use a manual wheelchair and/or has a caregiver that is able to assist: Yes  7. Does the patient have a lower extremity injury or edema?Yes  Reason for Type of Wheelchair  Wt Readings from Last 5 Encounters:   02/02/24 104.6 kg (230 lb 9.6 oz)   01/26/24 100.6 kg (221 lb 12.8 oz)   01/15/24 105 kg (231 lb 6.4 oz)   01/10/24 106.4 kg (234 lb 9.6 oz)   01/08/24 105.9 kg (233 lb 6.4 oz)     Pt needing above DME with expected length of need of 99 year  due to medical necessity associated with following diagnosis:     Other chronic osteomyelitis of left foot (H)  Diabetic ulcer of left heel associated with type 2 diabetes mellitus, with necrosis of bone (H)  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)  Acute blood loss anemia  Venous stasis  Essential hypertension  Type 2 diabetes mellitus with diabetic polyneuropathy, with long-term current use of insulin (H)  Dyslipidemia  PAD (peripheral artery disease) (H24)  Persistent atrial fibrillation (H)  Stage 3a chronic kidney disease (H)  Primary hyperparathyroidism (H24)  Chronic obstructive pulmonary disease, unspecified COPD type (H)  Mild intermittent asthma without complication  Other chronic pain  Slow transit constipation  Severe obesity (BMI 35.0-39.9) with comorbidity (H)  BERLIN (obstructive sleep apnea)  Physical deconditioning  Paroxysmal atrial fibrillation (H)  Warfarin-induced coagulopathy  (H24)  Long term (current) use of anticoagulants  Opioid dependence, uncomplicated (H)  Chronic right-sided heart failure (H)  Severe obesity (H)  Lymphedema  Status post debridement  Redness  Skin lesion  Major depressive disorder, recurrent episode,  "moderate (H)  Essential hypertension with goal blood pressure less than 140/90      PMH   has a past medical history of Depressive disorder, not elsewhere classified, Herpes zoster without mention of complication, Hypercalcemia (2/24/2007), Mild intermittent asthma, Other urinary incontinence, Type II or unspecified type diabetes mellitus with renal manifestations, uncontrolled(250.42) (H), Type II or unspecified type diabetes mellitus without mention of complication, not stated as uncontrolled, and Unspecified essential hypertension.    ROS:4 point ROS including Respiratory, CV, GI and , other than that noted in the HPI,  is negative    EXAM  /66   Pulse 90   Temp 98.1  F (36.7  C)   Resp 18   Ht 1.676 m (5' 6\")   Wt 104.6 kg (230 lb 9.6 oz)   SpO2 98%   BMI 37.22 kg/m    GENERAL APPEARANCE:  Alert, in no distress, oriented, morbidly obese, cooperative  ENT:  Mouth and posterior oropharynx normal, moist mucous membranes, normal hearing acuity  EYES:  EOM, conjunctivae, lids, pupils and irises normal  NECK:  No adenopathy,masses or thyromegaly  RESP:  respiratory effort and palpation of chest normal, lungs clear to auscultation , no respiratory distress  CV:  Palpation and auscultation of heart done , regular rate and rhythm, no murmur, rub, or gallop, peripheral edema 2-3+ in RLE  ABDOMEN:  normal bowel sounds, soft, nontender, no hepatosplenomegaly or other masses, no guarding or rebound  M/S:   Slide board transfers. Wheelchair bound.   SKIN:  wound healing well, no signs of infection see photos below  NEURO:   Cranial nerves 2-12 are normal tested and grossly at patient's baseline, no purposeful movement in upper and lower extremities  PSYCH:  oriented X 3, normal insight, judgement and memory, affect and mood normal      ASSESSMENT/PLAN:  1. Other chronic osteomyelitis of left foot (H)    2. Diabetic ulcer of left heel associated with type 2 diabetes mellitus, with necrosis of bone (H)    3. " 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)    4. Acute blood loss anemia    5. Venous stasis    6. Essential hypertension    7. Type 2 diabetes mellitus with diabetic polyneuropathy, with long-term current use of insulin (H)    8. Dyslipidemia    9. PAD (peripheral artery disease) (H24)    10. Persistent atrial fibrillation (H)    11. Stage 3a chronic kidney disease (H)    12. Primary hyperparathyroidism (H24)    13. Chronic obstructive pulmonary disease, unspecified COPD type (H)    14. Mild intermittent asthma without complication    15. Other chronic pain    16. Slow transit constipation    17. Severe obesity (BMI 35.0-39.9) with comorbidity (H)    18. BERLIN (obstructive sleep apnea)    19. Physical deconditioning    20. Paroxysmal atrial fibrillation (H)    21. Warfarin-induced coagulopathy  (H24)    22. Long term (current) use of anticoagulants    23. Opioid dependence, uncomplicated (H)    24. Chronic right-sided heart failure (H)    25. Severe obesity (H)    26. Lymphedema    27. Status post debridement    28. Redness    29. Skin lesion    30. Major depressive disorder, recurrent episode, moderate (H)    31. Essential hypertension with goal blood pressure less than 140/90        Orders:  1. Facility staff/TC to contact DME company to get their order form for provider to fill out    ELECTRONICALLY SIGNED BY ROJELIO CERTIFIED PROVIDER:  MOE Fitch CNP   NPI: [id number]  [facility]  [address] [place] [address], [address]  [place] [address]               Sincerely,        MOE Fitch CNP

## 2024-02-07 ENCOUNTER — VIRTUAL VISIT (OUTPATIENT)
Dept: OTHER | Facility: CLINIC | Age: 79
End: 2024-02-07
Payer: COMMERCIAL

## 2024-02-07 DIAGNOSIS — Z89.512 STATUS POST BELOW-KNEE AMPUTATION OF LEFT LOWER EXTREMITY (H): Primary | ICD-10-CM

## 2024-02-07 PROCEDURE — 99024 POSTOP FOLLOW-UP VISIT: CPT | Mod: 93

## 2024-02-07 NOTE — PROGRESS NOTES
Pat is a 78 year old who is being evaluated via a billable telephone visit.      What phone number would you like to be contacted at?   [phone]        How would you like to obtain your AVS? Ana Stacy MA

## 2024-02-07 NOTE — PROGRESS NOTES
Margaret Loredo is a pleasant 78 year old female who underwent BKA and then several weeks later underwent skin grafting of BKA stump. Based off the pictures and patient's assessment, BKA is fully healed. Discussed with Margaret that she can start discussing prosthetics, will refer her. Follow-up with Vascular Surgery as needed.     Total telephone visit: 10 minutes    Vanita Carter CNP

## 2024-02-08 VITALS
DIASTOLIC BLOOD PRESSURE: 65 MMHG | TEMPERATURE: 97.9 F | RESPIRATION RATE: 18 BRPM | HEART RATE: 81 BPM | HEIGHT: 66 IN | OXYGEN SATURATION: 100 % | BODY MASS INDEX: 37.61 KG/M2 | WEIGHT: 234 LBS | SYSTOLIC BLOOD PRESSURE: 114 MMHG

## 2024-02-08 NOTE — PROGRESS NOTES
[facility] GERIATRICS  Chief Complaint   Patient presents with    [facility] Medical Record Number:  [id number]  Place of Service where encounter took place:  [facility] ([facility] ([place]) [[id number]]    HPI:    Linda Loredo  is 78 year old (1945), who is being seen today for a federally mandated E/M visit. Today's concerns are:     Other chronic osteomyelitis of left foot (H)  Diabetic ulcer of left heel associated with type 2 diabetes mellitus, with necrosis of bone (H)  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)  Acute blood loss anemia  Venous stasis  Essential hypertension  Type 2 diabetes mellitus with diabetic polyneuropathy, with long-term current use of insulin (H)  Dyslipidemia  Persistent atrial fibrillation (H)  PAD (peripheral artery disease) (H24)  Stage 3a chronic kidney disease (H)  Primary hyperparathyroidism (H24)  Chronic obstructive pulmonary disease, unspecified COPD type (H)  Mild intermittent asthma without complication  Other chronic pain  Slow transit constipation  Severe obesity (BMI 35.0-39.9) with comorbidity (H)  BERLIN (obstructive sleep apnea)  Physical deconditioning  Paroxysmal atrial fibrillation (H)  Warfarin-induced coagulopathy  (H24)  Long term (current) use of anticoagulants  Opioid dependence, uncomplicated (H)  Chronic right-sided heart failure (H)  Severe obesity (H)  Lymphedema  Status post debridement  Redness  Skin lesion  Major depressive disorder, recurrent episode, moderate (H)    Met with patient who denies any chest pain, palpitations, shortness of breath, RASHID, lightheadedness, dizziness, or cough. Denies any abdominal discomfort. Denies N&V. Denies B&B concerns. Denies dysuria or frequency. Denies loose or constipation. Appetite good. She reports her daughter makes her meals and brings it in for her consumption. Weight gain present. Suspect due to edema concerns to RLE.  Sleeping well. No complaints of pain to RLE. I did write message to vascular for additional recommendations. Would recommend she schedule an in person visit to discuss wound care goals, as this may be a barrier of why she cannot find TOMMY placement.     BP Readings from Last 3 Encounters:   02/08/24 114/65   02/02/24 114/66   01/26/24 (!) 149/92     Wt Readings from Last 5 Encounters:   02/08/24 106.1 kg (234 lb)   02/02/24 104.6 kg (230 lb 9.6 oz)   01/26/24 100.6 kg (221 lb 12.8 oz)   01/15/24 105 kg (231 lb 6.4 oz)   01/10/24 106.4 kg (234 lb 9.6 oz)     ALLERGIES:Prednisone, Morphine, and Morphine [fumaric acid]  PAST MEDICAL HISTORY:   Past Medical History:   Diagnosis Date    Depressive disorder, not elsewhere classified     Herpes zoster without mention of complication     Hypercalcemia 2/24/2007    Mild intermittent asthma     Other urinary incontinence     Type II or unspecified type diabetes mellitus with renal manifestations, uncontrolled(250.42) (H)     Type II or unspecified type diabetes mellitus without mention of complication, not stated as uncontrolled     Unspecified essential hypertension      PAST SURGICAL HISTORY:   has a past surgical history that includes Cholecystectomy; ligation of fallopian tube; Open reduction internal fixation ankle (10/13/11); pinning of left 2nd toe; Incision and drainage foot, combined (Left, 9/26/2023); IR Lower Extremity Angiogram Left (10/3/2023); Amputate leg below knee (Left, 10/7/2023); Amputate leg below knee (Left, 10/14/2023); Graft skin split thickness from trunk to head (Left, 1/2/2024); and Apply Wound Vac (Left, 1/2/2024).  FAMILY HISTORY: family history includes Cancer in her maternal grandmother and paternal grandmother; Diabetes in her sister; Heart Disease in her father; Hypertension in her mother and sister; Psychotic Disorder in her sister; Respiratory in her sister.  SOCIAL HISTORY:  reports that she has never smoked. She has never used smokeless  tobacco. She reports that she does not drink alcohol and does not use drugs.    MEDICATIONS:  MED REC REQUIRED  Post Medication Reconciliation Status:  Medication reconciliation previously completed during another office visit         Review of your medicines            Accurate as of February 9, 2024  8:41 AM. If you have any questions, ask your nurse or doctor.                CONTINUE these medicines which have NOT CHANGED        Dose / Directions   acetaminophen 325 MG tablet  Commonly known as: TYLENOL  Used for: Pressure injury of skin, unspecified injury stage, unspecified location      Dose: 650 mg  Take 2 tablets (650 mg) by mouth every 6 hours as needed for mild pain or other (and adjunct with moderate or severe pain or per patient request)  Refills: 0     aspirin 81 MG EC tablet  Used for: Hx of BKA, left (H)      Dose: 81 mg  Take 1 tablet (81 mg) by mouth daily  Refills: 0     atorvastatin 40 MG tablet  Commonly known as: LIPITOR  Used for: Hx of BKA, left (H)      Dose: 40 mg  Take 1 tablet (40 mg) by mouth every evening  Refills: 0     bisacodyl 10 MG suppository  Commonly known as: DULCOLAX  Used for: Drug-induced constipation      Dose: 10 mg  Place 1 suppository (10 mg) rectally daily as needed for constipation  Refills: 0     insulin degludec 200 UNIT/ML pen  Commonly known as: TRESIBA  Indication: Type 2 Diabetes  Used for: Type 2 diabetes mellitus with diabetic polyneuropathy, with long-term current use of insulin (H)      Dose: 30 Units  Inject 30 Units Subcutaneous at bedtime Hold for blood glucose less than 100  Refills: 0     loperamide 2 MG tablet  Commonly known as: IMODIUM A-D  Used for: Diarrhea, unspecified type      Dose: 2 mg  Take 1 tablet (2 mg) by mouth 4 times daily as needed for diarrhea  Refills: 0     * methadone 10 MG tablet  Commonly known as: DOLOPHINE  Used for: Opioid dependence, uncomplicated (H)      Dose: 10 mg  Take 1 tablet (10 mg) by mouth daily Daily at  1500  Quantity: 30 tablet  Refills: 0     * methadone 5 MG tablet  Commonly known as: DOLOPHINE  Used for: Opioid dependence, uncomplicated (H)      Dose: 5 mg  Take 1 tablet (5 mg) by mouth 3 times daily  Quantity: 90 tablet  Refills: 0     miconazole 2 % external powder  Commonly known as: MICATIN  Used for: Hx of BKA, left (H)      Apply topically 2 times daily Apply to buttock and fungal areas BID and BID PRN  Quantity: 85 g  Refills: 11     mineral oil-hydrophilic petrolatum external ointment  Used for: Skin lesion      Apply to left thigh wound daily and daily PRN  Quantity: 396 g  Refills: 1     multivitamin w/minerals tablet  Used for: Hx of BKA, left (H)      Dose: 1 tablet  Take 1 tablet by mouth daily  Refills: 0     mupirocin 2 % external ointment  Commonly known as: BACTROBAN  Used for: Skin lesion      Apply topically 2 times daily Apply to RLE lesions  Refills: 0     NovoLOG FLEXPEN 100 UNIT/ML soln  Used for: Type 2 diabetes mellitus with diabetic polyneuropathy, with long-term current use of insulin (H)  Generic drug: insulin aspart      If Pre-meal Glucose: 140 -164 give 1 unit, 165 -189 give 2 units, 190 -214 give 3 units, 215 -239 give 4 units, 240 -264 give 5 units, 265 -289 give 6 units, 290 -314 give 7 units, 315 -339 give 8 units, 340+ give 9 units. If Bedtime Glucose: 200 -214 give 1 unit, 215 -264 give 2 units, 265 -314 give 3 units, 315+ give 4 units.  Quantity: 15 mL  Refills: 11     ondansetron 4 MG ODT tab  Commonly known as: ZOFRAN ODT  Used for: Nausea      Dose: 4 mg  Take 1 tablet (4 mg) by mouth every 6 hours as needed for nausea or vomiting  Refills: 0     polyethylene glycol 17 GM/Dose powder  Commonly known as: MIRALAX  Used for: Drug-induced constipation, Slow transit constipation      Dose: 17 g  Take 17 g by mouth 2 times daily as needed for constipation  Refills: 0     senna-docusate 8.6-50 MG tablet  Commonly known as: SENOKOT-S/PERICOLACE  Used for: Drug-induced  "constipation, Slow transit constipation      Dose: 2 tablet  Take 2 tablets by mouth 2 times daily as needed for constipation  Refills: 0     torsemide 20 MG tablet  Commonly known as: DEMADEX  Used for: Essential hypertension with goal blood pressure less than 140/90      Dose: 40 mg  Take 2 tablets (40 mg) by mouth daily HOLD if SBP<100  Quantity: 60 tablet  Refills: 11     Warfarin Therapy Reminder  Used for: Paroxysmal atrial fibrillation (H), Long term (current) use of anticoagulants, Warfarin-induced coagulopathy  (H24)      Per INR  Refills: 0           * This list has 2 medication(s) that are the same as other medications prescribed for you. Read the directions carefully, and ask your doctor or other care provider to review them with you.                 Case Management:  I have reviewed the care plan and MDS and do agree with the plan. Patient's desire to return to the community is present, but is not able due to care needs . Information reviewed:  Medications, vital signs, orders, and nursing notes.    ROS:  4 point ROS including Respiratory, CV, GI and , other than that noted in the HPI,  is negative    Vitals:  /65   Pulse 81   Temp 97.9  F (36.6  C)   Resp 18   Ht 1.676 m (5' 6\")   Wt 106.1 kg (234 lb)   SpO2 100%   BMI 37.77 kg/m    Body mass index is 37.77 kg/m .  Exam:  GENERAL APPEARANCE:  Alert, in no distress, oriented, morbidly obese, cooperative  ENT:  Mouth and posterior oropharynx normal, moist mucous membranes, normal hearing acuity  EYES:  EOM, conjunctivae, lids, pupils and irises normal  NECK:  No adenopathy,masses or thyromegaly  RESP:  respiratory effort and palpation of chest normal, lungs clear to auscultation , no respiratory distress  CV:  Palpation and auscultation of heart done , regular rate and rhythm, no murmur, rub, or gallop, peripheral edema 3+ in RLE  ABDOMEN:  normal bowel sounds, soft, nontender, no hepatosplenomegaly or other masses, no guarding or " rebound  M/S:   slide board transfers. Wheelchair bound  SKIN:  Inspection of skin and subcutaneous tissue baseline, see photos. Increased edema and redness present to RLE suspect due to edema.   NEURO:   Cranial nerves 2-12 are normal tested and grossly at patient's baseline, no purposeful movement in upper and lower extremities  PSYCH:  oriented X 3, normal insight, judgement and memory, affect and mood normal                            Lab/Diagnostic data:   Most Recent 3 CBC's:  Recent Labs   Lab Test 02/02/24  1043 12/27/23  0958 12/08/23  0600   WBC 9.2 8.0 7.5   HGB 10.9* 10.5* 9.6*   MCV 91 90 91    199 198     Most Recent 3 BMP's:  Recent Labs   Lab Test 02/02/24  1043 01/02/24  1225 01/02/24  1136 01/02/24  0804 12/27/23  0958     --   --  136 137   POTASSIUM 4.5  --   --  4.1 4.5   CHLORIDE 97*  --   --  94* 96*   CO2 33*  --   --  34* 32*   BUN 46.2*  --   --  37.1* 40.4*   CR 1.04*  --   --  0.87 0.95   ANIONGAP 8  --   --  8 9   ZAKIA 10.5*  --   --  11.0* 10.6*   * 208* 226* 232* 261*     Most Recent 2 LFT's:  Recent Labs   Lab Test 11/17/23  1033 11/01/23  0613   AST 20 19   ALT 11 12   ALKPHOS 90 97   BILITOTAL 0.5 0.2     Most Recent 3 INR's:  Recent Labs   Lab Test 01/02/24  0857 12/08/23  1226 11/24/23  1012   INR 1.47* 2.73* 3.50*     Most Recent TSH and T4:  Recent Labs   Lab Test 11/24/23  1012   TSH 3.46   T4 1.20     Most Recent Hemoglobin A1c:  Recent Labs   Lab Test 01/02/24  0804   A1C 8.7*     Most Recent Anemia Panel:  Recent Labs   Lab Test 02/02/24  1043   WBC 9.2   HGB 10.9*   HCT 34.6*   MCV 91          ASSESSMENT/PLAN:  (E11.621,  L97.509,  Z79.4) Type 2 diabetes mellitus with foot ulcer, with long-term current use of insulin (H)  (E66.01) Obesity, Class III, BMI 40-49.9 (morbid obesity) (H)  Comment: Chronic. Last A1c 7% in sept 2023. Results on 1/2/24 was 8.7%. Goal <9%. Discussed history of being on metformin in the past. No longer on this and she does  not know why. I suspect it may be due to GUSTAVO history. She reports being followed by endocrine and is wanting to see them again.   Plan:   -Monitor Blood Glucose QID as directed  -Continue Novlog sliding scale high coverage TID and at HS  -Continue tresiba 30 units at HS  -Advised her to schedule follow up with endocrinology and staff to assist with scheduling transportation. Pending appt. Follow up with Dr Remy Villareal with [facility] [phone]   -Obtain hgb A1c in 3 months. Due march 2024  -BMP and INR due 2/16/24              (I48.0) Paroxysmal atrial fibrillation (H)  (Z79.01) Long term (current) use of anticoagulants  (D68.32,  T45.515A) Warfarin-induced coagulopathy   (I10) Essential hypertension with goal blood pressure less than 140/90  (E78.2) Mixed hyperlipidemia  (N18.30) Stage 3 chronic kidney disease, unspecified whether stage 3a or 3b CKD (H)  (I50.812) Chronic right-sided heart failure (H)  Comment: Chronic. Baseline Creatinine~ 1-1.2. Based on JNC-8 goals,  patients age of 78 year old, presence of diabetes or CKD, and goals of care goal BP is  <140/90 mm Hg. Patient is stable with current plan of care and routine assessment..Holding lisinopril per chart review per patient wishes. INR goal 2-3. Last INR via fingerstick was 2.6 on 1/26/24. INR last week via fingerstick 2.8  Plan:   -Monitor for worsening s/symptoms of concerns  -Monitor BP and HR as directed  -Continue asa 81mg daily  -Continue atorvastatin 40mg daily  -Continue coumadin 2mg daily  -Monitor weights as directed. Baseline weight around 222-224# on admission but noted highest weight noted to be 238#. Highest documented was 238#. Now recent weight 235.3# today per reports  -Increase torsemide to 80mg daily x 3 days. After 3 days then reduce back to 40mg daily thereafter. HOLD if SBP<100   -BMP and INR due 2/16/24          (R60.0) Edema of right extremity  (L53.9) Redness  Comment: Acute on chronic. Noted increased redness to RLE with warmth  and pain soon after admission. Currently on coumadin. US negative for DVT risks. 4 areas areas are present to right leg. 3 anterior and 1 posterior (see photo above for reference). Suspect edema may also be contributing to wound concerns to RLE. Most areas are scabbed over except for left anterior shin area continues to weep clear fluid. No itch.   Plan:  -Restart Lymphedema therapy to assess and assist with edema control  -Continue wound care to RLE for now: Cleanse with normal saline. Apply bactroban to scabbed areas. Apply weeping area with betadine. Cover weeping area with calcium alignate then cover with folded ABD pad then wrap with kerlix. Ace wrap to secure in place from toes to knee. Perform BID and PRN until healed.    -Monitor for worsening s/symptoms of concerns   -Would recommend to follow up with vascular. I did inVettro message for advisement/recommendations. Advised to schedule a virtual appt   -BMP and INR due 2/16/24     (E11.621,  L97.425) Diabetic ulcer of left heel associated with type 2 diabetes mellitus, with muscle involvement without evidence of necrosis (H)  (Z89.512) 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)  (Z98.890) Status post debridement  (M86.9) Osteomyelitis of left foot, unspecified type (H)  (F11.20) Opioid dependence, uncomplicated (H)  (G89.4) Chronic pain syndrome  (I83.019,  I83.029,  L97.919,  L97.929) Venous stasis ulcers of both lower extremities (H)  (I89.0) Lymphedema  (Z94.5) History of skin graft placement on 1/2/24 as above  Comment: Chronic. S/p debridement 9/26/23. S/p Left guillotine BKA on 10/7/23. S/p tibial/fibular resection and debridement of stump 10/17/23. Followed by vascular. Completed course of meropenem/vancomycin 10/8 for osteomyelitis. Surgery completed 1/2/24 per review.   Plan:   -Monitor pain complaints  -Continue methadone 10mg daily at 1500 and 5mg TID scheduled.   -Tylenol PRN  -BMP and INR due 2/16/24  -Follow up with vascular  as directed. Pending prosthetic to follow up.   -Aquaphor to thigh daily and PRN     Electronically Signed by:  Dr. Haylie Rowe DNP, APRN, FNP-C, WCS-C, EDS-C

## 2024-02-09 ENCOUNTER — NURSING HOME VISIT (OUTPATIENT)
Dept: GERIATRICS | Facility: CLINIC | Age: 79
End: 2024-02-09
Payer: COMMERCIAL

## 2024-02-09 DIAGNOSIS — I73.9 PAD (PERIPHERAL ARTERY DISEASE) (H): ICD-10-CM

## 2024-02-09 DIAGNOSIS — I89.0 LYMPHEDEMA: ICD-10-CM

## 2024-02-09 DIAGNOSIS — M86.672 OTHER CHRONIC OSTEOMYELITIS OF LEFT FOOT (H): Primary | ICD-10-CM

## 2024-02-09 DIAGNOSIS — E11.42 TYPE 2 DIABETES MELLITUS WITH DIABETIC POLYNEUROPATHY, WITH LONG-TERM CURRENT USE OF INSULIN (H): ICD-10-CM

## 2024-02-09 DIAGNOSIS — I10 ESSENTIAL HYPERTENSION WITH GOAL BLOOD PRESSURE LESS THAN 140/90: ICD-10-CM

## 2024-02-09 DIAGNOSIS — Z79.01 LONG TERM (CURRENT) USE OF ANTICOAGULANTS: ICD-10-CM

## 2024-02-09 DIAGNOSIS — I48.0 PAROXYSMAL ATRIAL FIBRILLATION (H): ICD-10-CM

## 2024-02-09 DIAGNOSIS — I87.8 VENOUS STASIS: ICD-10-CM

## 2024-02-09 DIAGNOSIS — I50.812 CHRONIC RIGHT-SIDED HEART FAILURE (H): ICD-10-CM

## 2024-02-09 DIAGNOSIS — Z79.4 TYPE 2 DIABETES MELLITUS WITH DIABETIC POLYNEUROPATHY, WITH LONG-TERM CURRENT USE OF INSULIN (H): ICD-10-CM

## 2024-02-09 DIAGNOSIS — D62 ACUTE BLOOD LOSS ANEMIA: ICD-10-CM

## 2024-02-09 DIAGNOSIS — E21.0 PRIMARY HYPERPARATHYROIDISM (H): ICD-10-CM

## 2024-02-09 DIAGNOSIS — J44.9 CHRONIC OBSTRUCTIVE PULMONARY DISEASE, UNSPECIFIED COPD TYPE (H): ICD-10-CM

## 2024-02-09 DIAGNOSIS — G89.29 OTHER CHRONIC PAIN: ICD-10-CM

## 2024-02-09 DIAGNOSIS — E11.621 DIABETIC ULCER OF LEFT HEEL ASSOCIATED WITH TYPE 2 DIABETES MELLITUS, WITH NECROSIS OF BONE (H): ICD-10-CM

## 2024-02-09 DIAGNOSIS — G47.33 OSA (OBSTRUCTIVE SLEEP APNEA): ICD-10-CM

## 2024-02-09 DIAGNOSIS — Z89.512 STATUS POST BELOW-KNEE AMPUTATION OF LEFT LOWER EXTREMITY (H): ICD-10-CM

## 2024-02-09 DIAGNOSIS — L97.424 DIABETIC ULCER OF LEFT HEEL ASSOCIATED WITH TYPE 2 DIABETES MELLITUS, WITH NECROSIS OF BONE (H): ICD-10-CM

## 2024-02-09 DIAGNOSIS — L98.9 SKIN LESION: ICD-10-CM

## 2024-02-09 DIAGNOSIS — R53.81 PHYSICAL DECONDITIONING: ICD-10-CM

## 2024-02-09 DIAGNOSIS — J45.20 MILD INTERMITTENT ASTHMA WITHOUT COMPLICATION: ICD-10-CM

## 2024-02-09 DIAGNOSIS — T45.515A WARFARIN-INDUCED COAGULOPATHY (H): ICD-10-CM

## 2024-02-09 DIAGNOSIS — N18.31 STAGE 3A CHRONIC KIDNEY DISEASE (H): ICD-10-CM

## 2024-02-09 DIAGNOSIS — E78.5 DYSLIPIDEMIA: ICD-10-CM

## 2024-02-09 DIAGNOSIS — D68.32 WARFARIN-INDUCED COAGULOPATHY (H): ICD-10-CM

## 2024-02-09 DIAGNOSIS — Z98.890 STATUS POST DEBRIDEMENT: ICD-10-CM

## 2024-02-09 DIAGNOSIS — L53.9 REDNESS: ICD-10-CM

## 2024-02-09 DIAGNOSIS — I10 ESSENTIAL HYPERTENSION: ICD-10-CM

## 2024-02-09 DIAGNOSIS — K59.01 SLOW TRANSIT CONSTIPATION: ICD-10-CM

## 2024-02-09 DIAGNOSIS — E66.01 SEVERE OBESITY (H): ICD-10-CM

## 2024-02-09 DIAGNOSIS — F11.20 OPIOID DEPENDENCE, UNCOMPLICATED (H): ICD-10-CM

## 2024-02-09 DIAGNOSIS — I48.19 PERSISTENT ATRIAL FIBRILLATION (H): ICD-10-CM

## 2024-02-09 DIAGNOSIS — F33.1 MAJOR DEPRESSIVE DISORDER, RECURRENT EPISODE, MODERATE (H): ICD-10-CM

## 2024-02-09 DIAGNOSIS — E66.01 SEVERE OBESITY (BMI 35.0-39.9) WITH COMORBIDITY (H): ICD-10-CM

## 2024-02-09 PROCEDURE — 99309 SBSQ NF CARE MODERATE MDM 30: CPT | Performed by: NURSE PRACTITIONER

## 2024-02-09 RX ORDER — METHADONE HYDROCHLORIDE 10 MG/1
10 TABLET ORAL DAILY
Qty: 30 TABLET | Refills: 0 | Status: SHIPPED | OUTPATIENT
Start: 2024-02-09 | End: 2024-04-03

## 2024-02-09 RX ORDER — TORSEMIDE 20 MG/1
40 TABLET ORAL DAILY
Qty: 60 TABLET | Refills: 11 | Status: SHIPPED | OUTPATIENT
Start: 2024-02-12 | End: 2024-02-23 | Stop reason: ALTCHOICE

## 2024-02-09 RX ORDER — METHADONE HYDROCHLORIDE 5 MG/1
5 TABLET ORAL 3 TIMES DAILY
Qty: 90 TABLET | Refills: 0 | Status: SHIPPED | OUTPATIENT
Start: 2024-02-09 | End: 2024-04-03

## 2024-02-09 NOTE — LETTER
2/9/2024        RE: Linda Loredo  [address] [facility] [address]        [facility] GERIATRICS  Chief Complaint   Patient presents with     group home Regulatory     [place] Medical Record Number:  [id number]  Place of Service where encounter took place:  [facility] ([place]) [[id number]]    HPI:    Linda Loredo  is 78 year old (1945), who is being seen today for a federally mandated E/M visit. Today's concerns are:     Other chronic osteomyelitis of left foot (H)  Diabetic ulcer of left heel associated with type 2 diabetes mellitus, with necrosis of bone (H)  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)  Acute blood loss anemia  Venous stasis  Essential hypertension  Type 2 diabetes mellitus with diabetic polyneuropathy, with long-term current use of insulin (H)  Dyslipidemia  Persistent atrial fibrillation (H)  PAD (peripheral artery disease) (H24)  Stage 3a chronic kidney disease (H)  Primary hyperparathyroidism (H24)  Chronic obstructive pulmonary disease, unspecified COPD type (H)  Mild intermittent asthma without complication  Other chronic pain  Slow transit constipation  Severe obesity (BMI 35.0-39.9) with comorbidity (H)  BERLIN (obstructive sleep apnea)  Physical deconditioning  Paroxysmal atrial fibrillation (H)  Warfarin-induced coagulopathy  (H24)  Long term (current) use of anticoagulants  Opioid dependence, uncomplicated (H)  Chronic right-sided heart failure (H)  Severe obesity (H)  Lymphedema  Status post debridement  Redness  Skin lesion  Major depressive disorder, recurrent episode, moderate (H)    Met with patient who denies any chest pain, palpitations, shortness of breath, RASHID, lightheadedness, dizziness, or cough. Denies any abdominal discomfort. Denies N&V. Denies B&B concerns. Denies dysuria or frequency. Denies loose or constipation. Appetite good. She reports her daughter makes her meals  and brings it in for her consumption. Weight gain present. Suspect due to edema concerns to RLE. Sleeping well. No complaints of pain to RLE. I did write message to vascular for additional recommendations. Would recommend she schedule an in person visit to discuss wound care goals, as this may be a barrier of why she cannot find [facility]MY placement.     BP Readings from Last 3 Encounters:   02/08/24 114/65   02/02/24 114/66   01/26/24 (!) 149/92     Wt Readings from Last 5 Encounters:   02/08/24 106.1 kg (234 lb)   02/02/24 104.6 kg (230 lb 9.6 oz)   01/26/24 100.6 kg (221 lb 12.8 oz)   01/15/24 105 kg (231 lb 6.4 oz)   01/10/24 106.4 kg (234 lb 9.6 oz)     ALLERGIES:Prednisone, Morphine, and Morphine [fumaric acid]  PAST MEDICAL HISTORY:   Past Medical History:   Diagnosis Date     Depressive disorder, not elsewhere classified      Herpes zoster without mention of complication      Hypercalcemia 2/24/2007     Mild intermittent asthma      Other urinary incontinence      Type II or unspecified type diabetes mellitus with renal manifestations, uncontrolled(250.42) (H)      Type II or unspecified type diabetes mellitus without mention of complication, not stated as uncontrolled      Unspecified essential hypertension      PAST SURGICAL HISTORY:   has a past surgical history that includes Cholecystectomy; ligation of fallopian tube; Open reduction internal fixation ankle (10/13/11); pinning of left 2nd toe; Incision and drainage foot, combined (Left, 9/26/2023); IR Lower Extremity Angiogram Left (10/3/2023); Amputate leg below knee (Left, 10/7/2023); Amputate leg below knee (Left, 10/14/2023); Graft skin split thickness from trunk to head (Left, 1/2/2024); and Apply Wound Vac (Left, 1/2/2024).  FAMILY HISTORY: family history includes Cancer in her maternal grandmother and paternal grandmother; Diabetes in her sister; Heart Disease in her father; Hypertension in her mother and sister; Psychotic Disorder in her sister;  Respiratory in her sister.  SOCIAL HISTORY:  reports that she has never smoked. She has never used smokeless tobacco. She reports that she does not drink alcohol and does not use drugs.    MEDICATIONS:  MED REC REQUIRED  Post Medication Reconciliation Status:  Medication reconciliation previously completed during another office visit         Review of your medicines            Accurate as of February 9, 2024  8:41 AM. If you have any questions, ask your nurse or doctor.                CONTINUE these medicines which have NOT CHANGED        Dose / Directions   acetaminophen 325 MG tablet  Commonly known as: TYLENOL  Used for: Pressure injury of skin, unspecified injury stage, unspecified location      Dose: 650 mg  Take 2 tablets (650 mg) by mouth every 6 hours as needed for mild pain or other (and adjunct with moderate or severe pain or per patient request)  Refills: 0     aspirin 81 MG EC tablet  Used for: Hx of BKA, left (H)      Dose: 81 mg  Take 1 tablet (81 mg) by mouth daily  Refills: 0     atorvastatin 40 MG tablet  Commonly known as: LIPITOR  Used for: Hx of BKA, left (H)      Dose: 40 mg  Take 1 tablet (40 mg) by mouth every evening  Refills: 0     bisacodyl 10 MG suppository  Commonly known as: DULCOLAX  Used for: Drug-induced constipation      Dose: 10 mg  Place 1 suppository (10 mg) rectally daily as needed for constipation  Refills: 0     insulin degludec 200 UNIT/ML pen  Commonly known as: TRESIBA  Indication: Type 2 Diabetes  Used for: Type 2 diabetes mellitus with diabetic polyneuropathy, with long-term current use of insulin (H)      Dose: 30 Units  Inject 30 Units Subcutaneous at bedtime Hold for blood glucose less than 100  Refills: 0     loperamide 2 MG tablet  Commonly known as: IMODIUM A-D  Used for: Diarrhea, unspecified type      Dose: 2 mg  Take 1 tablet (2 mg) by mouth 4 times daily as needed for diarrhea  Refills: 0     * methadone 10 MG tablet  Commonly known as: DOLOPHINE  Used for:  Opioid dependence, uncomplicated (H)      Dose: 10 mg  Take 1 tablet (10 mg) by mouth daily Daily at 1500  Quantity: 30 tablet  Refills: 0     * methadone 5 MG tablet  Commonly known as: DOLOPHINE  Used for: Opioid dependence, uncomplicated (H)      Dose: 5 mg  Take 1 tablet (5 mg) by mouth 3 times daily  Quantity: 90 tablet  Refills: 0     miconazole 2 % external powder  Commonly known as: MICATIN  Used for: Hx of BKA, left (H)      Apply topically 2 times daily Apply to buttock and fungal areas BID and BID PRN  Quantity: 85 g  Refills: 11     mineral oil-hydrophilic petrolatum external ointment  Used for: Skin lesion      Apply to left thigh wound daily and daily PRN  Quantity: 396 g  Refills: 1     multivitamin w/minerals tablet  Used for: Hx of BKA, left (H)      Dose: 1 tablet  Take 1 tablet by mouth daily  Refills: 0     mupirocin 2 % external ointment  Commonly known as: BACTROBAN  Used for: Skin lesion      Apply topically 2 times daily Apply to RLE lesions  Refills: 0     NovoLOG FLEXPEN 100 UNIT/ML soln  Used for: Type 2 diabetes mellitus with diabetic polyneuropathy, with long-term current use of insulin (H)  Generic drug: insulin aspart      If Pre-meal Glucose: 140 -164 give 1 unit, 165 -189 give 2 units, 190 -214 give 3 units, 215 -239 give 4 units, 240 -264 give 5 units, 265 -289 give 6 units, 290 -314 give 7 units, 315 -339 give 8 units, 340+ give 9 units. If Bedtime Glucose: 200 -214 give 1 unit, 215 -264 give 2 units, 265 -314 give 3 units, 315+ give 4 units.  Quantity: 15 mL  Refills: 11     ondansetron 4 MG ODT tab  Commonly known as: ZOFRAN ODT  Used for: Nausea      Dose: 4 mg  Take 1 tablet (4 mg) by mouth every 6 hours as needed for nausea or vomiting  Refills: 0     polyethylene glycol 17 GM/Dose powder  Commonly known as: MIRALAX  Used for: Drug-induced constipation, Slow transit constipation      Dose: 17 g  Take 17 g by mouth 2 times daily as needed for constipation  Refills: 0    "  senna-docusate 8.6-50 MG tablet  Commonly known as: SENOKOT-S/PERICOLACE  Used for: Drug-induced constipation, Slow transit constipation      Dose: 2 tablet  Take 2 tablets by mouth 2 times daily as needed for constipation  Refills: 0     torsemide 20 MG tablet  Commonly known as: DEMADEX  Used for: Essential hypertension with goal blood pressure less than 140/90      Dose: 40 mg  Take 2 tablets (40 mg) by mouth daily HOLD if SBP<100  Quantity: 60 tablet  Refills: 11     Warfarin Therapy Reminder  Used for: Paroxysmal atrial fibrillation (H), Long term (current) use of anticoagulants, Warfarin-induced coagulopathy  (H24)      Per INR  Refills: 0           * This list has 2 medication(s) that are the same as other medications prescribed for you. Read the directions carefully, and ask your doctor or other care provider to review them with you.                 Case Management:  I have reviewed the care plan and MDS and do agree with the plan. Patient's desire to return to the community is present, but is not able due to care needs . Information reviewed:  Medications, vital signs, orders, and nursing notes.    ROS:  4 point ROS including Respiratory, CV, GI and , other than that noted in the HPI,  is negative    Vitals:  /65   Pulse 81   Temp 97.9  F (36.6  C)   Resp 18   Ht 1.676 m (5' 6\")   Wt 106.1 kg (234 lb)   SpO2 100%   BMI 37.77 kg/m    Body mass index is 37.77 kg/m .  Exam:  GENERAL APPEARANCE:  Alert, in no distress, oriented, morbidly obese, cooperative  ENT:  Mouth and posterior oropharynx normal, moist mucous membranes, normal hearing acuity  EYES:  EOM, conjunctivae, lids, pupils and irises normal  NECK:  No adenopathy,masses or thyromegaly  RESP:  respiratory effort and palpation of chest normal, lungs clear to auscultation , no respiratory distress  CV:  Palpation and auscultation of heart done , regular rate and rhythm, no murmur, rub, or gallop, peripheral edema 3+ in RLE  ABDOMEN:  " normal bowel sounds, soft, nontender, no hepatosplenomegaly or other masses, no guarding or rebound  M/S:   slide board transfers. Wheelchair bound  SKIN:  Inspection of skin and subcutaneous tissue baseline, see photos. Increased edema and redness present to RLE suspect due to edema.   NEURO:   Cranial nerves 2-12 are normal tested and grossly at patient's baseline, no purposeful movement in upper and lower extremities  PSYCH:  oriented X 3, normal insight, judgement and memory, affect and mood normal                            Lab/Diagnostic data:   Most Recent 3 CBC's:  Recent Labs   Lab Test 02/02/24  1043 12/27/23  0958 12/08/23  0600   WBC 9.2 8.0 7.5   HGB 10.9* 10.5* 9.6*   MCV 91 90 91    199 198     Most Recent 3 BMP's:  Recent Labs   Lab Test 02/02/24  1043 01/02/24  1225 01/02/24  1136 01/02/24  0804 12/27/23  0958     --   --  136 137   POTASSIUM 4.5  --   --  4.1 4.5   CHLORIDE 97*  --   --  94* 96*   CO2 33*  --   --  34* 32*   BUN 46.2*  --   --  37.1* 40.4*   CR 1.04*  --   --  0.87 0.95   ANIONGAP 8  --   --  8 9   ZAKIA 10.5*  --   --  11.0* 10.6*   * 208* 226* 232* 261*     Most Recent 2 LFT's:  Recent Labs   Lab Test 11/17/23  1033 11/01/23  0613   AST 20 19   ALT 11 12   ALKPHOS 90 97   BILITOTAL 0.5 0.2     Most Recent 3 INR's:  Recent Labs   Lab Test 01/02/24  0857 12/08/23  1226 11/24/23  1012   INR 1.47* 2.73* 3.50*     Most Recent TSH and T4:  Recent Labs   Lab Test 11/24/23  1012   TSH 3.46   T4 1.20     Most Recent Hemoglobin A1c:  Recent Labs   Lab Test 01/02/24  0804   A1C 8.7*     Most Recent Anemia Panel:  Recent Labs   Lab Test 02/02/24  1043   WBC 9.2   HGB 10.9*   HCT 34.6*   MCV 91          ASSESSMENT/PLAN:  (E11.621,  L97.509,  Z79.4) Type 2 diabetes mellitus with foot ulcer, with long-term current use of insulin (H)  (E66.01) Obesity, Class III, BMI 40-49.9 (morbid obesity) (H)  Comment: Chronic. Last A1c 7% in sept 2023. Results on 1/2/24 was 8.7%.  Goal <9%. Discussed history of being on metformin in the past. No longer on this and she does not know why. I suspect it may be due to GUSTAVO history. She reports being followed by endocrine and is wanting to see them again.   Plan:   -Monitor Blood Glucose QID as directed  -Continue Novlog sliding scale high coverage TID and at HS  -Continue tresiba 30 units at HS  -Advised her to schedule follow up with endocrinology and staff to assist with scheduling transportation. Pending appt. Follow up with Dr Remy Villareal with [facility] [phone]   -Obtain hgb A1c in 3 months. Due march 2024  -BMP and INR due 2/16/24              (I48.0) Paroxysmal atrial fibrillation (H)  (Z79.01) Long term (current) use of anticoagulants  (D68.32,  T45.515A) Warfarin-induced coagulopathy   (I10) Essential hypertension with goal blood pressure less than 140/90  (E78.2) Mixed hyperlipidemia  (N18.30) Stage 3 chronic kidney disease, unspecified whether stage 3a or 3b CKD (H)  (I50.812) Chronic right-sided heart failure (H)  Comment: Chronic. Baseline Creatinine~ 1-1.2. Based on JNC-8 goals,  patients age of 78 year old, presence of diabetes or CKD, and goals of care goal BP is  <140/90 mm Hg. Patient is stable with current plan of care and routine assessment..Holding lisinopril per chart review per patient wishes. INR goal 2-3. Last INR via fingerstick was 2.6 on 1/26/24. INR last week via fingerstick 2.8  Plan:   -Monitor for worsening s/symptoms of concerns  -Monitor BP and HR as directed  -Continue asa 81mg daily  -Continue atorvastatin 40mg daily  -Continue coumadin 2mg daily  -Monitor weights as directed. Baseline weight around 222-224# on admission but noted highest weight noted to be 238#. Highest documented was 238#. Now recent weight 235.3# today per reports  -Increase torsemide to 80mg daily x 3 days. After 3 days then reduce back to 40mg daily thereafter. HOLD if SBP<100   -BMP and INR due 2/16/24          (R60.0) Edema of right  extremity  (L53.9) Redness  Comment: Acute on chronic. Noted increased redness to RLE with warmth and pain soon after admission. Currently on coumadin. US negative for DVT risks. 4 areas areas are present to right leg. 3 anterior and 1 posterior (see photo above for reference). Suspect edema may also be contributing to wound concerns to RLE. Most areas are scabbed over except for left anterior shin area continues to weep clear fluid. No itch.   Plan:  -Restart Lymphedema therapy to assess and assist with edema control  -Continue wound care to RLE for now: Cleanse with normal saline. Apply bactroban to scabbed areas. Apply weeping area with betadine. Cover weeping area with calcium alignate then cover with folded ABD pad then wrap with kerlix. Ace wrap to secure in place from toes to knee. Perform BID and PRN until healed.    -Monitor for worsening s/symptoms of concerns   -Would recommend to follow up with vascular. I did inTV Pixie message for advisement/recommendations. Advised to schedule a virtual appt   -BMP and INR due 2/16/24     (E11.621,  L97.425) Diabetic ulcer of left heel associated with type 2 diabetes mellitus, with muscle involvement without evidence of necrosis (H)  (Z89.512) 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)  (Z98.890) Status post debridement  (M86.9) Osteomyelitis of left foot, unspecified type (H)  (F11.20) Opioid dependence, uncomplicated (H)  (G89.4) Chronic pain syndrome  (I83.019,  I83.029,  L97.919,  L97.929) Venous stasis ulcers of both lower extremities (H)  (I89.0) Lymphedema  (Z94.5) History of skin graft placement on 1/2/24 as above  Comment: Chronic. S/p debridement 9/26/23. S/p Left guillotine BKA on 10/7/23. S/p tibial/fibular resection and debridement of stump 10/17/23. Followed by vascular. Completed course of meropenem/vancomycin 10/8 for osteomyelitis. Surgery completed 1/2/24 per review.   Plan:   -Monitor pain complaints  -Continue methadone 10mg daily  at 1500 and 5mg TID scheduled.   -Tylenol PRN  -BMP and INR due 2/16/24  -Follow up with vascular as directed. Pending prosthetic to follow up.   -Aquaphor to thigh daily and PRN     Electronically Signed by:  Dr. Haylie Rowe DNP, APRN, FNP-C, WCS-C, EDS-C               Sincerely,        Haylie Rowe, APRN CNP

## 2024-02-12 DIAGNOSIS — I73.9 PAD (PERIPHERAL ARTERY DISEASE) (H): ICD-10-CM

## 2024-02-12 DIAGNOSIS — T14.8XXA OPEN WOUND: ICD-10-CM

## 2024-02-12 DIAGNOSIS — I87.8 VENOUS STASIS: Primary | ICD-10-CM

## 2024-02-13 ENCOUNTER — DOCUMENTATION ONLY (OUTPATIENT)
Dept: GERIATRICS | Facility: CLINIC | Age: 79
End: 2024-02-13
Payer: COMMERCIAL

## 2024-02-14 DIAGNOSIS — I10 ESSENTIAL HYPERTENSION: Primary | ICD-10-CM

## 2024-02-14 DIAGNOSIS — I89.0 LYMPHEDEMA: ICD-10-CM

## 2024-02-14 RX ORDER — FUROSEMIDE 40 MG
40 TABLET ORAL 2 TIMES DAILY
Qty: 60 TABLET | Refills: 1 | Status: SHIPPED | OUTPATIENT
Start: 2024-02-14 | End: 2024-02-19 | Stop reason: ALTCHOICE

## 2024-02-15 ENCOUNTER — LAB REQUISITION (OUTPATIENT)
Dept: LAB | Facility: CLINIC | Age: 79
End: 2024-02-15
Payer: COMMERCIAL

## 2024-02-15 VITALS
BODY MASS INDEX: 38.6 KG/M2 | OXYGEN SATURATION: 99 % | HEIGHT: 66 IN | HEART RATE: 84 BPM | SYSTOLIC BLOOD PRESSURE: 126 MMHG | DIASTOLIC BLOOD PRESSURE: 66 MMHG | TEMPERATURE: 97.9 F | WEIGHT: 240.2 LBS | RESPIRATION RATE: 17 BRPM

## 2024-02-15 DIAGNOSIS — N18.30 CHRONIC KIDNEY DISEASE, STAGE 3 UNSPECIFIED (H): ICD-10-CM

## 2024-02-15 DIAGNOSIS — I48.0 PAROXYSMAL ATRIAL FIBRILLATION (H): ICD-10-CM

## 2024-02-15 DIAGNOSIS — I10 ESSENTIAL (PRIMARY) HYPERTENSION: ICD-10-CM

## 2024-02-15 NOTE — PROGRESS NOTES
[facility]IATRICS    Chief Complaint   Patient presents with    RECHECK     HPI:  Linda Loredo is a 78 year old  (1945), who is being seen today for an episodic care visit at: [facility] ([facility] & ) (NF) [[id number]]. Today's concern is: The primary encounter diagnosis was Essential hypertension. Diagnoses of Lymphedema, Venous stasis, PAD (peripheral artery disease) (H24), Open wound, Other chronic osteomyelitis of left foot (H), Diabetic ulcer of left heel associated with type 2 diabetes mellitus, with necrosis of bone (H), 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), Acute blood loss anemia, Type 2 diabetes mellitus with diabetic polyneuropathy, with long-term current use of insulin (H), Dyslipidemia, Persistent atrial fibrillation (H), Skin lesion, Stage 3a chronic kidney disease (H), Primary hyperparathyroidism (H24), Slow transit constipation, Severe obesity (BMI 35.0-39.9) with comorbidity (H), Physical deconditioning, Paroxysmal atrial fibrillation (H), Warfarin-induced coagulopathy  (H24), Long term (current) use of anticoagulants, Opioid dependence, uncomplicated (H), Chronic right-sided heart failure (H), Severe obesity (H), Status post debridement, and Redness were also pertinent to this visit.    Met with patient who was found sitting upright in wheelchair. Propels self around unit appropriately without concerns. She denies any chest pain, palpitations, shortness of breath, RASHID, lightheadedness, dizziness, or cough. Denies any abdominal discomfort. Denies N&V. Denies B&B concerns. Denies dysuria or frequency. Denies loose or constipation. Appetite good. Daughter mostly brings in home cooked meals for consumption. Sleeping well. Chronic pain complaints which is stable with current regimen. Pending prosthetic needs. Has some edema to left upper thigh, therefore will start lymphedema to left thigh to assist with obtaining prosthetic  "in near future. RLE wounds remain, but appear to be slowly improving. She reports weeping drainage has slowly been reducing in amounts. Tolerating lymphedema services at this time. Mood stable. No acute concerns noted.     BP Readings from Last 3 Encounters:   02/15/24 126/66   02/08/24 114/65   02/02/24 114/66     Wt Readings from Last 5 Encounters:   02/15/24 109 kg (240 lb 3.2 oz)   02/08/24 106.1 kg (234 lb)   02/02/24 104.6 kg (230 lb 9.6 oz)   01/26/24 100.6 kg (221 lb 12.8 oz)   01/15/24 105 kg (231 lb 6.4 oz)     Allergies, and PMH/PSH reviewed in EPIC today.  REVIEW OF SYSTEMS:  10 point ROS of systems including Constitutional, Eyes, Respiratory, Cardiovascular, Gastroenterology, Genitourinary, Integumentary, Musculoskeletal, Psychiatric were all negative except for pertinent positives noted in my HPI.    Objective:   /66   Pulse 84   Temp 97.9  F (36.6  C)   Resp 17   Ht 1.676 m (5' 6\")   Wt 109 kg (240 lb 3.2 oz)   SpO2 99%   BMI 38.77 kg/m    GENERAL APPEARANCE:  Alert, in no distress, oriented, morbidly obese, cooperative  ENT:  Mouth and posterior oropharynx normal, moist mucous membranes, normal hearing acuity  EYES:  EOM, conjunctivae, lids, pupils and irises normal  NECK:  No adenopathy,masses or thyromegaly  RESP:  respiratory effort and palpation of chest normal, lungs clear to auscultation , no respiratory distress  CV:  Palpation and auscultation of heart done , regular rate and rhythm, no murmur, rub, or gallop, edema present to left stump site along with right lower extremity. RLE is about2+ edema.   ABDOMEN:  normal bowel sounds, soft, nontender, no hepatosplenomegaly or other masses, no guarding or rebound  M/S:   Wheelchair bound. Slide board transfers for all needs. Independent with wheelchair mobility. Independent with self transfers. Needs assistance with minimal LB cares.   SKIN:  Inspection of skin and subcutaneous tissue baseline, wound healing well, no signs of infection " see photos  NEURO:   Cranial nerves 2-12 are normal tested and grossly at patient's baseline, no purposeful movement in upper and lower extremities  PSYCH:  oriented X 3, normal insight, judgement and memory, affect and mood normal                    Most Recent 3 CBC's:  Recent Labs   Lab Test 02/02/24  1043 12/27/23  0958 12/08/23  0600   WBC 9.2 8.0 7.5   HGB 10.9* 10.5* 9.6*   MCV 91 90 91    199 198     Most Recent 3 BMP's:  Recent Labs   Lab Test 02/02/24  1043 01/02/24  1225 01/02/24  1136 01/02/24  0804 12/27/23  0958     --   --  136 137   POTASSIUM 4.5  --   --  4.1 4.5   CHLORIDE 97*  --   --  94* 96*   CO2 33*  --   --  34* 32*   BUN 46.2*  --   --  37.1* 40.4*   CR 1.04*  --   --  0.87 0.95   ANIONGAP 8  --   --  8 9   ZAKIA 10.5*  --   --  11.0* 10.6*   * 208* 226* 232* 261*     Most Recent 2 LFT's:  Recent Labs   Lab Test 11/17/23  1033 11/01/23  0613   AST 20 19   ALT 11 12   ALKPHOS 90 97   BILITOTAL 0.5 0.2     Most Recent 3 INR's:  Recent Labs   Lab Test 01/02/24  0857 12/08/23  1226 11/24/23  1012   INR 1.47* 2.73* 3.50*     Most Recent Hemoglobin A1c:  Recent Labs   Lab Test 01/02/24  0804   A1C 8.7*     Most Recent Anemia Panel:  Recent Labs   Lab Test 02/02/24  1043   WBC 9.2   HGB 10.9*   HCT 34.6*   MCV 91          ASSESSMENT/PLAN:  (E11.621,  L97.509,  Z79.4) Type 2 diabetes mellitus with foot ulcer, with long-term current use of insulin (H)  (E66.01) Obesity, Class III, BMI 40-49.9 (morbid obesity) (H)  Comment: Chronic. Last A1c on 1/2/24 was 8.7%. Goal <9%. Discussed history of being on metformin in the past. No longer on this and she does not know why. I suspect it may be due to GUSTAVO history. She reports being followed by endocrine and is wanting to see them again.   Plan:   -Monitor Blood Glucose QID as directed  -Continue Novlog sliding scale high coverage TID and at HS  -Increase tresiba to 34 units at HS  -Advised her to schedule follow up with endocrinology  and staff to assist with scheduling transportation. Pending appt. Follow up with Dr Remy Villareal with [facility] [phone]   -Obtain hgb A1c in 3 months. Due march 2024  -BMP and INR due 2/16/24--results pending  -BMP, CBC, hgb A1c, and INR due 3/1/24             (I48.0) Paroxysmal atrial fibrillation (H)  (Z79.01) Long term (current) use of anticoagulants  (D68.32,  T45.515A) Warfarin-induced coagulopathy   (I10) Essential hypertension with goal blood pressure less than 140/90  (E78.2) Mixed hyperlipidemia  (N18.30) Stage 3 chronic kidney disease, unspecified whether stage 3a or 3b CKD (H)  (I50.812) Chronic right-sided heart failure (H)  Comment: Chronic. Baseline Creatinine~ 1-1.2. Based on JNC-8 goals,  patients age of 78 year old, presence of diabetes or CKD, and goals of care goal BP is  <140/90 mm Hg. Patient is stable with current plan of care and routine assessment.. INR goal 2-3. Last INR via fingerstick was 2.8, INR today was 2.1 via fingerstick with staff  Plan:   -Monitor for worsening s/symptoms of concerns  -Monitor BP and HR as directed  -Continue asa 81mg daily  -Continue atorvastatin 40mg daily  -Continue coumadin 2mg daily  -Monitor weights as directed. Baseline weight around 222-224# on admission but noted highest weight noted to be 240#.   -Per discussion with patient, she reports improvement with edema in past and tolerated better with furosemide dose vs torsemide therefore changed to furosemide 40mg BID. HOLD if SBP<100   -Continue lymphedema therapy to bilateral lower extremities as directed.   -BMP and INR due 2/16/24--results pending  -BMP, CBC, hgb A1c, and INR due 3/1/24     (R60.0) Edema of right extremity  (L53.9) Redness  Comment: Acute on chronic. Noted increased redness to RLE with warmth and pain soon after admission. Currently on coumadin. US negative for DVT risks. 4 areas areas are present to right leg. 3 anterior and 1 posterior (see photo above for reference). Suspect edema may  also be contributing to wound concerns to RLE. Most areas are scabbed over except for left anterior shin area continues to weep clear fluid. No itch.   Plan:  -Continue Lymphedema therapy to assess and assist with edema control M-F on site.   -Follow up with wound clinic as directed. Scheduled for 2/26/24.   -Continue wound care to RLE for now: Cleanse with normal saline. Apply bactroban to scabbed areas and open area. Cover weeping area with calcium alignate then cover with folded ABD pad then wrap with kerlix. Perform daily with lymphedema therapy M-F compression and On weekends then Ace wrap to secure in place from toes to knee vs lymphedema. May benefit for unna boot?  -Monitor for worsening s/symptoms of concerns   -BMP and INR due 2/16/24--results pending  -BMP, CBC, hgb A1c, and INR due 3/1/24     (E11.621,  L97.425) Diabetic ulcer of left heel associated with type 2 diabetes mellitus, with muscle involvement without evidence of necrosis (H)  (Z89.512) 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)  (Z98.890) Status post debridement  (M86.9) Osteomyelitis of left foot, unspecified type (H)  (F11.20) Opioid dependence, uncomplicated (H)  (G89.4) Chronic pain syndrome  (I83.019,  I83.029,  L97.919,  L97.929) Venous stasis ulcers of both lower extremities (H)  (I89.0) Lymphedema  (Z94.5) History of skin graft placement on 1/2/24 as above  Comment: Chronic. S/p debridement 9/26/23. S/p Left guillotine BKA on 10/7/23. S/p tibial/fibular resection and debridement of stump 10/17/23. Followed by vascular. Completed course of meropenem/vancomycin 10/8 for osteomyelitis. Surgery completed 1/2/24 per review.   Plan:   -Monitor pain complaints  -Continue methadone 10mg daily at 1500 and 5mg TID scheduled.   -Tylenol PRN  -BMP and INR due 2/16/24--results pending  -BMP, CBC, hgb A1c, and INR due 3/1/24  -Follow up with vascular as directed PRN. Prosthetic follow up as directed  -Lymphedema needs to left  "stump to assist with prosthetic needs  -Aquaphor to thigh and stump BID     Electronically Signed by:  Dr. Haylie Rowe DNP, APRN, FNP-C, WCS-C, EDS-C        Face to Face and Medical Necessity Statement for DME Provider visit    Demographic Information on Linda Loredo:  Gender: female  : 1945  [address] [facility] [address]  [phone] (home)     Medical Record: [id number]  Social Security Number: [id number]  Primary Care Provider: Haylie Rowe  Insurance: Payor: [facility] / Plan: [employer] MEDICARE / Product Type: HMO /     HPI:   Linda Loredo is a 78 year old  (1945), who is being seen today for a face to face provider visit at [facility]; medical necessity statement for DME included. This patient requires the following:  DME Ordered and Medical Necessity Statement     Left below knee prosethesis-- [place]ges to assist with measurement and ordering to obtain.       Ht Readings from Last 2 Encounters:   02/15/24 1.676 m (5' 6\")   24 1.676 m (5' 6\")     Wt Readings from Last 5 Encounters:   02/15/24 109 kg (240 lb 3.2 oz)   24 106.1 kg (234 lb)   24 104.6 kg (230 lb 9.6 oz)   24 100.6 kg (221 lb 12.8 oz)   01/15/24 105 kg (231 lb 6.4 oz)      Level 2: Establishing activities that prove patient will walk on uneven terrain and expected to achieve within 6 months:  Patient has ability or potential for for ambulation with the ability to traverse low level environmental barriers such as curbs, stairs, or uneven surfaces. Typical of the limited community ambulator. With prosthetic, patient would have potential for the ability to walk up to 2-3 blocks throughout the day on uneven terrain. Active on less than 50 stairs per day, community errands, household duties such as light cleaning needs.     Prior to amputation: patient was independent living at home. She was ambulatory with wheeled walker. Independent with all ADLs, " transfers and own cares. She was independent with ambulation on uneven terrain. She was independent with all house hold cleaning, shopping, and driving.     Current level of musculoskeletal examination: Patient is independent with slide board transfers. Active ROM intact to bilateral upper extremities. Stump present to left side with some edema present. Therapy will assist with lymphedema needs and control.  Edema present to Right lower extremity. Nonambulatory at this time due to lack of prosthetic. Patient is independent with most ADLs, transfers and cares. Currently requires minimal CGA for lower body needs. Patient has potential for ambulation once prosthetic is obtained. Patient participated well with physical therapy and occupational therapy while in LTC/TCU. Patient currently is using modified wheelchair for all long distance needs. Independent with wheelchair positioning and propel.     Pt needing above DME with expected length of need of 99 year due to medical necessity associated with following diagnosis:     Essential hypertension  Lymphedema  Venous stasis  PAD (peripheral artery disease) (H24)  Open wound  Other chronic osteomyelitis of left foot (H)  Diabetic ulcer of left heel associated with type 2 diabetes mellitus, with necrosis of bone (H)  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)  Acute blood loss anemia  Type 2 diabetes mellitus with diabetic polyneuropathy, with long-term current use of insulin (H)  Dyslipidemia  Persistent atrial fibrillation (H)  Skin lesion  Stage 3a chronic kidney disease (H)  Primary hyperparathyroidism (H24)  Slow transit constipation  Severe obesity (BMI 35.0-39.9) with comorbidity (H)  Physical deconditioning  Paroxysmal atrial fibrillation (H)  Warfarin-induced coagulopathy  (H24)  Long term (current) use of anticoagulants  Opioid dependence, uncomplicated (H)  Chronic right-sided heart failure (H)  Severe obesity (H)  Status post  "debridement  Redness      PMH   has a past medical history of Depressive disorder, not elsewhere classified, Herpes zoster without mention of complication, Hypercalcemia (2/24/2007), Mild intermittent asthma, Other urinary incontinence, Type II or unspecified type diabetes mellitus with renal manifestations, uncontrolled(250.42) (H), Type II or unspecified type diabetes mellitus without mention of complication, not stated as uncontrolled, and Unspecified essential hypertension.    ROS:10 point ROS of systems including Constitutional, Eyes, Respiratory, Cardiovascular, Gastroenterology, Genitourinary, Integumentary, Musculoskeletal, Psychiatric were all negative except for pertinent positives noted in my HPI.    EXAM  Vitals: /66   Pulse 84   Temp 97.9  F (36.6  C)   Resp 17   Ht 1.676 m (5' 6\")   Wt 109 kg (240 lb 3.2 oz)   SpO2 99%   BMI 38.77 kg/m  ;BMI= Body mass index is 38.77 kg/m .  GENERAL APPEARANCE:  Alert, in no distress, oriented, morbidly obese, cooperative  ENT:  Mouth and posterior oropharynx normal, moist mucous membranes, normal hearing acuity  EYES:  EOM, conjunctivae, lids, pupils and irises normal  NECK:  No adenopathy,masses or thyromegaly  RESP:  respiratory effort and palpation of chest normal, lungs clear to auscultation , no respiratory distress  CV:  Palpation and auscultation of heart done , regular rate and rhythm, no murmur, rub, or gallop, edema present to left stump site along with right lower extremity. RLE is about2+ edema.   ABDOMEN:  normal bowel sounds, soft, nontender, no hepatosplenomegaly or other masses, no guarding or rebound  M/S:   Wheelchair bound. Slide board transfers for all needs. Independent with wheelchair mobility. Independent with self transfers. Needs assistance with minimal LB cares.   SKIN:  Inspection of skin and subcutaneous tissue baseline, wound healing well, no signs of infection see photos  NEURO:   Cranial nerves 2-12 are normal tested and " grossly at patient's baseline, no purposeful movement in upper and lower extremities  PSYCH:  oriented X 3, normal insight, judgement and memory, affect and mood normal    See photos of wounds and amputation site above.      ASSESSMENT/PLAN:  1. Essential hypertension    2. Lymphedema    3. Venous stasis    4. PAD (peripheral artery disease) (H24)    5. Open wound    6. Other chronic osteomyelitis of left foot (H)    7. Diabetic ulcer of left heel associated with type 2 diabetes mellitus, with necrosis of bone (H)    8. 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)    9. Acute blood loss anemia    10. Type 2 diabetes mellitus with diabetic polyneuropathy, with long-term current use of insulin (H)    11. Dyslipidemia    12. Persistent atrial fibrillation (H)    13. Skin lesion    14. Stage 3a chronic kidney disease (H)    15. Primary hyperparathyroidism (H24)    16. Slow transit constipation    17. Severe obesity (BMI 35.0-39.9) with comorbidity (H)    18. Physical deconditioning    19. Paroxysmal atrial fibrillation (H)    20. Warfarin-induced coagulopathy  (H24)    21. Long term (current) use of anticoagulants    22. Opioid dependence, uncomplicated (H)    23. Chronic right-sided heart failure (H)    24. Severe obesity (H)    25. Status post debridement    26. Redness        Orders:  1. Facility staff/TC to contact DME company to get their order form for provider to fill out    ELECTRONICALLY SIGNED BY ROJELIO CERTIFIED PROVIDER:  MOE Fitch CNP   NPI: [id number]  [facility]  [address] [place] [address], [address]  [place] [address]

## 2024-02-16 ENCOUNTER — NURSING HOME VISIT (OUTPATIENT)
Dept: GERIATRICS | Facility: CLINIC | Age: 79
End: 2024-02-16
Payer: COMMERCIAL

## 2024-02-16 DIAGNOSIS — F11.20 OPIOID DEPENDENCE, UNCOMPLICATED (H): ICD-10-CM

## 2024-02-16 DIAGNOSIS — I73.9 PAD (PERIPHERAL ARTERY DISEASE) (H): ICD-10-CM

## 2024-02-16 DIAGNOSIS — L97.424 DIABETIC ULCER OF LEFT HEEL ASSOCIATED WITH TYPE 2 DIABETES MELLITUS, WITH NECROSIS OF BONE (H): ICD-10-CM

## 2024-02-16 DIAGNOSIS — E11.621 DIABETIC ULCER OF LEFT HEEL ASSOCIATED WITH TYPE 2 DIABETES MELLITUS, WITH NECROSIS OF BONE (H): ICD-10-CM

## 2024-02-16 DIAGNOSIS — Z79.4 TYPE 2 DIABETES MELLITUS WITH DIABETIC POLYNEUROPATHY, WITH LONG-TERM CURRENT USE OF INSULIN (H): ICD-10-CM

## 2024-02-16 DIAGNOSIS — E11.42 TYPE 2 DIABETES MELLITUS WITH DIABETIC POLYNEUROPATHY, WITH LONG-TERM CURRENT USE OF INSULIN (H): ICD-10-CM

## 2024-02-16 DIAGNOSIS — K59.01 SLOW TRANSIT CONSTIPATION: ICD-10-CM

## 2024-02-16 DIAGNOSIS — E78.5 DYSLIPIDEMIA: ICD-10-CM

## 2024-02-16 DIAGNOSIS — E66.01 SEVERE OBESITY (BMI 35.0-39.9) WITH COMORBIDITY (H): ICD-10-CM

## 2024-02-16 DIAGNOSIS — Z79.01 LONG TERM (CURRENT) USE OF ANTICOAGULANTS: ICD-10-CM

## 2024-02-16 DIAGNOSIS — E66.01 SEVERE OBESITY (H): ICD-10-CM

## 2024-02-16 DIAGNOSIS — I89.0 LYMPHEDEMA: ICD-10-CM

## 2024-02-16 DIAGNOSIS — N18.31 STAGE 3A CHRONIC KIDNEY DISEASE (H): ICD-10-CM

## 2024-02-16 DIAGNOSIS — I48.19 PERSISTENT ATRIAL FIBRILLATION (H): ICD-10-CM

## 2024-02-16 DIAGNOSIS — R53.81 PHYSICAL DECONDITIONING: ICD-10-CM

## 2024-02-16 DIAGNOSIS — L98.9 SKIN LESION: ICD-10-CM

## 2024-02-16 DIAGNOSIS — E21.0 PRIMARY HYPERPARATHYROIDISM (H): ICD-10-CM

## 2024-02-16 DIAGNOSIS — T14.8XXA OPEN WOUND: ICD-10-CM

## 2024-02-16 DIAGNOSIS — M86.672 OTHER CHRONIC OSTEOMYELITIS OF LEFT FOOT (H): ICD-10-CM

## 2024-02-16 DIAGNOSIS — L53.9 REDNESS: ICD-10-CM

## 2024-02-16 DIAGNOSIS — I87.8 VENOUS STASIS: ICD-10-CM

## 2024-02-16 DIAGNOSIS — D62 ACUTE BLOOD LOSS ANEMIA: ICD-10-CM

## 2024-02-16 DIAGNOSIS — I48.0 PAROXYSMAL ATRIAL FIBRILLATION (H): ICD-10-CM

## 2024-02-16 DIAGNOSIS — Z98.890 STATUS POST DEBRIDEMENT: ICD-10-CM

## 2024-02-16 DIAGNOSIS — I50.812 CHRONIC RIGHT-SIDED HEART FAILURE (H): ICD-10-CM

## 2024-02-16 DIAGNOSIS — D68.32 WARFARIN-INDUCED COAGULOPATHY (H): ICD-10-CM

## 2024-02-16 DIAGNOSIS — Z89.512 STATUS POST BELOW-KNEE AMPUTATION OF LEFT LOWER EXTREMITY (H): ICD-10-CM

## 2024-02-16 DIAGNOSIS — T45.515A WARFARIN-INDUCED COAGULOPATHY (H): ICD-10-CM

## 2024-02-16 DIAGNOSIS — I10 ESSENTIAL HYPERTENSION: Primary | ICD-10-CM

## 2024-02-16 LAB
ANION GAP SERPL CALCULATED.3IONS-SCNC: 11 MMOL/L (ref 7–15)
BUN SERPL-MCNC: 33.1 MG/DL (ref 8–23)
CALCIUM SERPL-MCNC: 10.4 MG/DL (ref 8.8–10.2)
CHLORIDE SERPL-SCNC: 96 MMOL/L (ref 98–107)
CREAT SERPL-MCNC: 1 MG/DL (ref 0.51–0.95)
DEPRECATED HCO3 PLAS-SCNC: 31 MMOL/L (ref 22–29)
EGFRCR SERPLBLD CKD-EPI 2021: 57 ML/MIN/1.73M2
GLUCOSE SERPL-MCNC: 253 MG/DL (ref 70–99)
INR PPP: 2.08 (ref 0.85–1.15)
POTASSIUM SERPL-SCNC: 3.8 MMOL/L (ref 3.4–5.3)
SODIUM SERPL-SCNC: 138 MMOL/L (ref 135–145)

## 2024-02-16 PROCEDURE — 99309 SBSQ NF CARE MODERATE MDM 30: CPT | Performed by: NURSE PRACTITIONER

## 2024-02-16 PROCEDURE — 85610 PROTHROMBIN TIME: CPT | Mod: ORL | Performed by: NURSE PRACTITIONER

## 2024-02-16 PROCEDURE — 36415 COLL VENOUS BLD VENIPUNCTURE: CPT | Mod: ORL | Performed by: NURSE PRACTITIONER

## 2024-02-16 PROCEDURE — 80048 BASIC METABOLIC PNL TOTAL CA: CPT | Mod: ORL | Performed by: NURSE PRACTITIONER

## 2024-02-16 NOTE — LETTER
2/16/2024        RE: Linda Loredo  [address] [facility] [address]        [facility]    Chief Complaint   Patient presents with     RECHECK     HPI:  Linda Loredo is a 78 year old  (1945), who is being seen today for an episodic care visit at: [facility] ([facility] & ) (NF) [[id number]]. Today's concern is: The primary encounter diagnosis was Essential hypertension. Diagnoses of Lymphedema, Venous stasis, PAD (peripheral artery disease) (H24), Open wound, Other chronic osteomyelitis of left foot (H), Diabetic ulcer of left heel associated with type 2 diabetes mellitus, with necrosis of bone (H), 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), Acute blood loss anemia, Type 2 diabetes mellitus with diabetic polyneuropathy, with long-term current use of insulin (H), Dyslipidemia, Persistent atrial fibrillation (H), Skin lesion, Stage 3a chronic kidney disease (H), Primary hyperparathyroidism (H24), Slow transit constipation, Severe obesity (BMI 35.0-39.9) with comorbidity (H), Physical deconditioning, Paroxysmal atrial fibrillation (H), Warfarin-induced coagulopathy  (H24), Long term (current) use of anticoagulants, Opioid dependence, uncomplicated (H), Chronic right-sided heart failure (H), Severe obesity (H), Status post debridement, and Redness were also pertinent to this visit.    Met with patient who was found sitting upright in wheelchair. Propels self around unit appropriately without concerns. She denies any chest pain, palpitations, shortness of breath, RASHID, lightheadedness, dizziness, or cough. Denies any abdominal discomfort. Denies N&V. Denies B&B concerns. Denies dysuria or frequency. Denies loose or constipation. Appetite good. Daughter mostly brings in home cooked meals for consumption. Sleeping well. Chronic pain complaints which is stable with current regimen. Pending prosthetic needs. Has some edema to  "left upper thigh, therefore will start lymphedema to left thigh to assist with obtaining prosthetic in near future. RLE wounds remain, but appear to be slowly improving. She reports weeping drainage has slowly been reducing in amounts. Tolerating lymphedema services at this time. Mood stable. No acute concerns noted.     BP Readings from Last 3 Encounters:   02/15/24 126/66   02/08/24 114/65   02/02/24 114/66     Wt Readings from Last 5 Encounters:   02/15/24 109 kg (240 lb 3.2 oz)   02/08/24 106.1 kg (234 lb)   02/02/24 104.6 kg (230 lb 9.6 oz)   01/26/24 100.6 kg (221 lb 12.8 oz)   01/15/24 105 kg (231 lb 6.4 oz)     Allergies, and PMH/PSH reviewed in EPIC today.  REVIEW OF SYSTEMS:  10 point ROS of systems including Constitutional, Eyes, Respiratory, Cardiovascular, Gastroenterology, Genitourinary, Integumentary, Musculoskeletal, Psychiatric were all negative except for pertinent positives noted in my HPI.    Objective:   /66   Pulse 84   Temp 97.9  F (36.6  C)   Resp 17   Ht 1.676 m (5' 6\")   Wt 109 kg (240 lb 3.2 oz)   SpO2 99%   BMI 38.77 kg/m    GENERAL APPEARANCE:  Alert, in no distress, oriented, morbidly obese, cooperative  ENT:  Mouth and posterior oropharynx normal, moist mucous membranes, normal hearing acuity  EYES:  EOM, conjunctivae, lids, pupils and irises normal  NECK:  No adenopathy,masses or thyromegaly  RESP:  respiratory effort and palpation of chest normal, lungs clear to auscultation , no respiratory distress  CV:  Palpation and auscultation of heart done , regular rate and rhythm, no murmur, rub, or gallop, edema present to left stump site along with right lower extremity. RLE is about2+ edema.   ABDOMEN:  normal bowel sounds, soft, nontender, no hepatosplenomegaly or other masses, no guarding or rebound  M/S:   Wheelchair bound. Slide board transfers for all needs. Independent with wheelchair mobility. Independent with self transfers. Needs assistance with minimal LB cares. "   SKIN:  Inspection of skin and subcutaneous tissue baseline, wound healing well, no signs of infection see photos  NEURO:   Cranial nerves 2-12 are normal tested and grossly at patient's baseline, no purposeful movement in upper and lower extremities  PSYCH:  oriented X 3, normal insight, judgement and memory, affect and mood normal                    Most Recent 3 CBC's:  Recent Labs   Lab Test 02/02/24  1043 12/27/23  0958 12/08/23  0600   WBC 9.2 8.0 7.5   HGB 10.9* 10.5* 9.6*   MCV 91 90 91    199 198     Most Recent 3 BMP's:  Recent Labs   Lab Test 02/02/24  1043 01/02/24  1225 01/02/24  1136 01/02/24  0804 12/27/23  0958     --   --  136 137   POTASSIUM 4.5  --   --  4.1 4.5   CHLORIDE 97*  --   --  94* 96*   CO2 33*  --   --  34* 32*   BUN 46.2*  --   --  37.1* 40.4*   CR 1.04*  --   --  0.87 0.95   ANIONGAP 8  --   --  8 9   ZAKIA 10.5*  --   --  11.0* 10.6*   * 208* 226* 232* 261*     Most Recent 2 LFT's:  Recent Labs   Lab Test 11/17/23  1033 11/01/23  0613   AST 20 19   ALT 11 12   ALKPHOS 90 97   BILITOTAL 0.5 0.2     Most Recent 3 INR's:  Recent Labs   Lab Test 01/02/24  0857 12/08/23  1226 11/24/23  1012   INR 1.47* 2.73* 3.50*     Most Recent Hemoglobin A1c:  Recent Labs   Lab Test 01/02/24  0804   A1C 8.7*     Most Recent Anemia Panel:  Recent Labs   Lab Test 02/02/24  1043   WBC 9.2   HGB 10.9*   HCT 34.6*   MCV 91          ASSESSMENT/PLAN:  (E11.621,  L97.509,  Z79.4) Type 2 diabetes mellitus with foot ulcer, with long-term current use of insulin (H)  (E66.01) Obesity, Class III, BMI 40-49.9 (morbid obesity) (H)  Comment: Chronic. Last A1c on 1/2/24 was 8.7%. Goal <9%. Discussed history of being on metformin in the past. No longer on this and she does not know why. I suspect it may be due to GUSTAVO history. She reports being followed by endocrine and is wanting to see them again.   Plan:   -Monitor Blood Glucose QID as directed  -Continue Novlog sliding scale high coverage  TID and at HS  -Increase tresiba to 34 units at HS  -Advised her to schedule follow up with endocrinology and staff to assist with scheduling transportation. Pending appt. Follow up with Dr Remy Villareal with [facility] [phone]   -Obtain hgb A1c in 3 months. Due march 2024  -BMP and INR due 2/16/24--results pending  -BMP, CBC, hgb A1c, and INR due 3/1/24             (I48.0) Paroxysmal atrial fibrillation (H)  (Z79.01) Long term (current) use of anticoagulants  (D68.32,  T45.515A) Warfarin-induced coagulopathy   (I10) Essential hypertension with goal blood pressure less than 140/90  (E78.2) Mixed hyperlipidemia  (N18.30) Stage 3 chronic kidney disease, unspecified whether stage 3a or 3b CKD (H)  (I50.812) Chronic right-sided heart failure (H)  Comment: Chronic. Baseline Creatinine~ 1-1.2. Based on JNC-8 goals,  patients age of 78 year old, presence of diabetes or CKD, and goals of care goal BP is  <140/90 mm Hg. Patient is stable with current plan of care and routine assessment.. INR goal 2-3. Last INR via fingerstick was 2.8, INR today was 2.1 via fingerstick with staff  Plan:   -Monitor for worsening s/symptoms of concerns  -Monitor BP and HR as directed  -Continue asa 81mg daily  -Continue atorvastatin 40mg daily  -Continue coumadin 2mg daily  -Monitor weights as directed. Baseline weight around 222-224# on admission but noted highest weight noted to be 240#.   -Per discussion with patient, she reports improvement with edema in past and tolerated better with furosemide dose vs torsemide therefore changed to furosemide 40mg BID. HOLD if SBP<100   -Continue lymphedema therapy to bilateral lower extremities as directed.   -BMP and INR due 2/16/24--results pending  -BMP, CBC, hgb A1c, and INR due 3/1/24     (R60.0) Edema of right extremity  (L53.9) Redness  Comment: Acute on chronic. Noted increased redness to RLE with warmth and pain soon after admission. Currently on coumadin. US negative for DVT risks. 4 areas  areas are present to right leg. 3 anterior and 1 posterior (see photo above for reference). Suspect edema may also be contributing to wound concerns to RLE. Most areas are scabbed over except for left anterior shin area continues to weep clear fluid. No itch.   Plan:  -Continue Lymphedema therapy to assess and assist with edema control M-F on site.   -Follow up with wound clinic as directed. Scheduled for 2/26/24.   -Continue wound care to RLE for now: Cleanse with normal saline. Apply bactroban to scabbed areas and open area. Cover weeping area with calcium alignate then cover with folded ABD pad then wrap with kerlix. Perform daily with lymphedema therapy M-F compression and On weekends then Ace wrap to secure in place from toes to knee vs lymphedema. May benefit for unna boot?  -Monitor for worsening s/symptoms of concerns   -BMP and INR due 2/16/24--results pending  -BMP, CBC, hgb A1c, and INR due 3/1/24     (E11.621,  L97.425) Diabetic ulcer of left heel associated with type 2 diabetes mellitus, with muscle involvement without evidence of necrosis (H)  (Z89.512) 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)  (Z98.890) Status post debridement  (M86.9) Osteomyelitis of left foot, unspecified type (H)  (F11.20) Opioid dependence, uncomplicated (H)  (G89.4) Chronic pain syndrome  (I83.019,  I83.029,  L97.919,  L97.929) Venous stasis ulcers of both lower extremities (H)  (I89.0) Lymphedema  (Z94.5) History of skin graft placement on 1/2/24 as above  Comment: Chronic. S/p debridement 9/26/23. S/p Left guillotine BKA on 10/7/23. S/p tibial/fibular resection and debridement of stump 10/17/23. Followed by vascular. Completed course of meropenem/vancomycin 10/8 for osteomyelitis. Surgery completed 1/2/24 per review.   Plan:   -Monitor pain complaints  -Continue methadone 10mg daily at 1500 and 5mg TID scheduled.   -Tylenol PRN  -BMP and INR due 2/16/24--results pending  -BMP, CBC, hgb A1c, and INR due  "3/1/24  -Follow up with vascular as directed PRN. Prosthetic follow up as directed  -Lymphedema needs to left stump to assist with prosthetic needs  -Aquaphor to thigh and stump BID     Electronically Signed by:  Dr. Haylie Rowe DNP, APRN, FNP-C, WCS-C, EDS-C        Face to Face and Medical Necessity Statement for DME Provider visit    Demographic Information on Linda Loredo:  Gender: female  : 1945  [address] [facility] [address]  [phone] (home)     Medical Record: [id number]  Social Security Number: [id number]  Primary Care Provider: Haylie Rowe  Insurance: Payor: [employer] / Plan: [employer] MEDICARE / Product Type: HMO /     HPI:   Linda Loredo is a 78 year old  (1945), who is being seen today for a face to face provider visit at [facility] TCU[facility]LTC; medical necessity statement for DME included. This patient requires the following:  DME Ordered and Medical Necessity Statement     Left below knee prosethesis-- Tillges to assist with measurement and ordering to obtain.       Ht Readings from Last 2 Encounters:   02/15/24 1.676 m (5' 6\")   24 1.676 m (5' 6\")     Wt Readings from Last 5 Encounters:   02/15/24 109 kg (240 lb 3.2 oz)   24 106.1 kg (234 lb)   24 104.6 kg (230 lb 9.6 oz)   24 100.6 kg (221 lb 12.8 oz)   01/15/24 105 kg (231 lb 6.4 oz)      Level 2: Establishing activities that prove patient will walk on uneven terrain and expected to achieve within 6 months:  Patient has ability or potential for for ambulation with the ability to traverse low level environmental barriers such as curbs, stairs, or uneven surfaces. Typical of the limited community ambulator. With prosthetic, patient would have potential for the ability to walk up to 2-3 blocks throughout the day on uneven terrain. Active on less than 50 stairs per day, community errands, household duties such as light cleaning needs.     Prior to amputation: " patient was independent living at home. She was ambulatory with wheeled walker. Independent with all ADLs, transfers and own cares. She was independent with ambulation on uneven terrain. She was independent with all house hold cleaning, shopping, and driving.     Current level of musculoskeletal examination: Patient is independent with slide board transfers. Active ROM intact to bilateral upper extremities. Stump present to left side with some edema present. Therapy will assist with lymphedema needs and control.  Edema present to Right lower extremity. Nonambulatory at this time due to lack of prosthetic. Patient is independent with most ADLs, transfers and cares. Currently requires minimal CGA for lower body needs. Patient has potential for ambulation once prosthetic is obtained. Patient participated well with physical therapy and occupational therapy while in LTC/TCU. Patient currently is using modified wheelchair for all long distance needs. Independent with wheelchair positioning and propel.     Pt needing above DME with expected length of need of 99 year due to medical necessity associated with following diagnosis:     Essential hypertension  Lymphedema  Venous stasis  PAD (peripheral artery disease) (H24)  Open wound  Other chronic osteomyelitis of left foot (H)  Diabetic ulcer of left heel associated with type 2 diabetes mellitus, with necrosis of bone (H)  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)  Acute blood loss anemia  Type 2 diabetes mellitus with diabetic polyneuropathy, with long-term current use of insulin (H)  Dyslipidemia  Persistent atrial fibrillation (H)  Skin lesion  Stage 3a chronic kidney disease (H)  Primary hyperparathyroidism (H24)  Slow transit constipation  Severe obesity (BMI 35.0-39.9) with comorbidity (H)  Physical deconditioning  Paroxysmal atrial fibrillation (H)  Warfarin-induced coagulopathy  (H24)  Long term (current) use of anticoagulants  Opioid dependence,  "uncomplicated (H)  Chronic right-sided heart failure (H)  Severe obesity (H)  Status post debridement  Redness      PMH   has a past medical history of Depressive disorder, not elsewhere classified, Herpes zoster without mention of complication, Hypercalcemia (2/24/2007), Mild intermittent asthma, Other urinary incontinence, Type II or unspecified type diabetes mellitus with renal manifestations, uncontrolled(250.42) (H), Type II or unspecified type diabetes mellitus without mention of complication, not stated as uncontrolled, and Unspecified essential hypertension.    ROS:10 point ROS of systems including Constitutional, Eyes, Respiratory, Cardiovascular, Gastroenterology, Genitourinary, Integumentary, Musculoskeletal, Psychiatric were all negative except for pertinent positives noted in my HPI.    EXAM  Vitals: /66   Pulse 84   Temp 97.9  F (36.6  C)   Resp 17   Ht 1.676 m (5' 6\")   Wt 109 kg (240 lb 3.2 oz)   SpO2 99%   BMI 38.77 kg/m  ;BMI= Body mass index is 38.77 kg/m .  GENERAL APPEARANCE:  Alert, in no distress, oriented, morbidly obese, cooperative  ENT:  Mouth and posterior oropharynx normal, moist mucous membranes, normal hearing acuity  EYES:  EOM, conjunctivae, lids, pupils and irises normal  NECK:  No adenopathy,masses or thyromegaly  RESP:  respiratory effort and palpation of chest normal, lungs clear to auscultation , no respiratory distress  CV:  Palpation and auscultation of heart done , regular rate and rhythm, no murmur, rub, or gallop, edema present to left stump site along with right lower extremity. RLE is about2+ edema.   ABDOMEN:  normal bowel sounds, soft, nontender, no hepatosplenomegaly or other masses, no guarding or rebound  M/S:   Wheelchair bound. Slide board transfers for all needs. Independent with wheelchair mobility. Independent with self transfers. Needs assistance with minimal LB cares.   SKIN:  Inspection of skin and subcutaneous tissue baseline, wound healing " well, no signs of infection see photos  NEURO:   Cranial nerves 2-12 are normal tested and grossly at patient's baseline, no purposeful movement in upper and lower extremities  PSYCH:  oriented X 3, normal insight, judgement and memory, affect and mood normal    See photos of wounds and amputation site above.      ASSESSMENT/PLAN:  1. Essential hypertension    2. Lymphedema    3. Venous stasis    4. PAD (peripheral artery disease) (H24)    5. Open wound    6. Other chronic osteomyelitis of left foot (H)    7. Diabetic ulcer of left heel associated with type 2 diabetes mellitus, with necrosis of bone (H)    8. 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)    9. Acute blood loss anemia    10. Type 2 diabetes mellitus with diabetic polyneuropathy, with long-term current use of insulin (H)    11. Dyslipidemia    12. Persistent atrial fibrillation (H)    13. Skin lesion    14. Stage 3a chronic kidney disease (H)    15. Primary hyperparathyroidism (H24)    16. Slow transit constipation    17. Severe obesity (BMI 35.0-39.9) with comorbidity (H)    18. Physical deconditioning    19. Paroxysmal atrial fibrillation (H)    20. Warfarin-induced coagulopathy  (H24)    21. Long term (current) use of anticoagulants    22. Opioid dependence, uncomplicated (H)    23. Chronic right-sided heart failure (H)    24. Severe obesity (H)    25. Status post debridement    26. Redness        Orders:  1. Facility staff/TC to contact DME company to get their order form for provider to fill out    ELECTRONICALLY SIGNED BY ROJELIO CERTIFIED PROVIDER:  MOE Fitch CNP   NPI: [id number]  [place] [facility]  [address] [place] [address], [address]  [place] [address]        Sincerely,        MOE Fitch CNP

## 2024-02-19 DIAGNOSIS — I10 ESSENTIAL HYPERTENSION: ICD-10-CM

## 2024-02-19 DIAGNOSIS — I89.0 LYMPHEDEMA: Primary | ICD-10-CM

## 2024-02-19 RX ORDER — TORSEMIDE 20 MG/1
40 TABLET ORAL DAILY
Qty: 60 TABLET | Refills: 11 | Status: ON HOLD | OUTPATIENT
Start: 2024-02-19 | End: 2024-06-10

## 2024-02-19 NOTE — PROGRESS NOTES
[place] [facility] TELEPHONE ENCOUNTER    Linda ELIE Loredo is a 79 year old  (1945),Nurse called today to report: weight gain now up to 242# despite adjustment to furosemide. She reports also not urinating as frequent when compared to torsemide    ASSESSMENT/PLAN    Discontinue furosemide  Restart torsemide tomorrow  Continue lymphedema therapy goals  May benefit from adding alternative hydrochlorothiazide or weekly metolazone if she is open to it. She is going to think about it for now    Electronically signed by:   MOE Fitch CNP

## 2024-02-21 ENCOUNTER — TELEPHONE (OUTPATIENT)
Dept: GERIATRICS | Facility: CLINIC | Age: 79
End: 2024-02-21
Payer: COMMERCIAL

## 2024-02-21 DIAGNOSIS — I89.0 LYMPHEDEMA: Primary | ICD-10-CM

## 2024-02-21 DIAGNOSIS — R63.5 WEIGHT GAIN: ICD-10-CM

## 2024-02-21 RX ORDER — METOLAZONE 2.5 MG/1
2.5 TABLET ORAL ONCE
Qty: 1 TABLET | Refills: 0 | Status: SHIPPED | OUTPATIENT
Start: 2024-02-22 | End: 2024-02-23 | Stop reason: ALTCHOICE

## 2024-02-21 NOTE — TELEPHONE ENCOUNTER
[place] [facility] TELEPHONE ENCOUNTER    Chief Complaint   Patient presents with    Weight Problem       Linda Loredo is a 79 year old  (1945),Patient reports ongoing weight gain with worsening wound appearance to her RLE. I did discuss potential weight gain may be related to heart failure. She is insistent that she does not have heart failure and does not need any follow up with cardiology. I did advise and recommend to continue with lymphedema therapy and perhaps may need to go higher than knee to assist with edema to thighs. She is reluctant on doing so as this will limit her independence in WC propelling.     ASSESSMENT/PLAN    Give one time dose of metolazone 2.5mg one time tomorrow  Continue torsemide 40mg daily  Continue lymphedema therapy as directed  Continue current wound care orders  Follow up with wound clinic on 2/26 as directed  Recommend to be mindful of water intake and salt.     Electronically signed by:   MOE Fitch CNP

## 2024-02-22 NOTE — PROGRESS NOTES
[facility] GERIATRICS    Chief Complaint   Patient presents with    RECHECK     HPI:  Linda Loredo is a 79 year old  (1945), who is being seen today for an episodic care visit at: [facility] ([facility] & ) (NF) [[id number]]. Today's concern is: The primary encounter diagnosis was Lymphedema. Diagnoses of Weight gain, Venous stasis, Essential hypertension, PAD (peripheral artery disease) (H24), Other chronic osteomyelitis of left foot (H), Open wound, Diabetic ulcer of left heel associated with type 2 diabetes mellitus, with necrosis of bone (H), 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), Acute blood loss anemia, Type 2 diabetes mellitus with diabetic polyneuropathy, with long-term current use of insulin (H), Dyslipidemia, Persistent atrial fibrillation (H), Skin lesion, Stage 3a chronic kidney disease (H), Severe obesity (BMI 35.0-39.9) with comorbidity (H), Paroxysmal atrial fibrillation (H), Warfarin-induced coagulopathy  (H24), Long term (current) use of anticoagulants, Slow transit constipation, Hx of skin graft, Opioid dependence, uncomplicated (H), Chronic right-sided heart failure (H), Severe obesity (H), Status post debridement, and Redness were also pertinent to this visit.    Met with patient who was found sitting upright in wheelchair. Patient is independent with stand pivot transfers in and out of wheelchair appropriately. She is able to utilize slide board for transfers independently if she needs as well. She is independent in wheelchair and able to propel self without concerns. She prefers to sleep upright in wheelchair but does elevate her RLE on stool. Did discuss option for recliner, but she reports trying a recliner in the past PTA and did not like it. She denies any chest pain, palpitations, shortness of breath, RASHID, lightheadedness, dizziness, or cough. Denies any abdominal discomfort. Denies N&V. Denies dysuria or frequency.  "Does report her stools have been on harder side. Has been utilizing PRN agents with good relief. LBM XL yesterday 2/22. Appetite good. Only consumes what daughter brings from home. Sleeping well. Chronic pain complaints but stable with current regimen. Patient pending to be assessed by long-term facility. Would HIGHLY recommend [facility] staff come out to assess patient in person to obtain a better picture for adequate assessment needs. Would recommend [facility] for patient at this time    BP Readings from Last 3 Encounters:   02/22/24 114/59   02/15/24 126/66   02/08/24 114/65     Wt Readings from Last 5 Encounters:   02/23/24 108.3 kg (238 lb 12.8 oz)   02/15/24 109 kg (240 lb 3.2 oz)   02/08/24 106.1 kg (234 lb)   02/02/24 104.6 kg (230 lb 9.6 oz)   01/26/24 100.6 kg (221 lb 12.8 oz)     Allergies, and PMH/PSH reviewed in EPIC today.  REVIEW OF SYSTEMS:  4 point ROS including Respiratory, CV, GI and , other than that noted in the HPI,  is negative    Objective:   /59   Pulse 70   Temp 98.8  F (37.1  C)   Resp 18   Ht 1.676 m (5' 6\")   Wt 108.3 kg (238 lb 12.8 oz)   SpO2 97%   BMI 38.54 kg/m    GENERAL APPEARANCE:  Alert, in no distress, oriented, cooperative  ENT:  Mouth and posterior oropharynx normal, moist mucous membranes, normal hearing acuity  EYES:  EOM, conjunctivae, lids, pupils and irises normal  NECK:  No adenopathy,masses or thyromegaly  RESP:  respiratory effort and palpation of chest normal, lungs clear to auscultation , no respiratory distress  CV:  Palpation and auscultation of heart done , regular rate and rhythm, no murmur, rub, or gallop, peripheral edema 1+ in RLE, stump present to LLE  ABDOMEN:  normal bowel sounds, soft, nontender, no hepatosplenomegaly or other masses, no guarding or rebound  M/S:   independent transfers with stand pivot or slide board. Independent with wheelchair needs.   SKIN:  wound healing well, no signs of infection see photos. Stump wound well healed with dry skin " present. Skin graft site to left upper thigh well healed with pink skin present. Open areas noted to RLE, but no longer weeping and slowly improving in size. Patient is able to assist with wound care needs with minimal assistance by Registered Nurse for completion of RLE cares. See photos below  NEURO:   Cranial nerves 2-12 are normal tested and grossly at patient's baseline, no purposeful movement in upper and lower extremities  PSYCH:  oriented X 3, normal insight, judgement and memory, affect and mood normal                                      Most Recent 3 CBC's:  Recent Labs   Lab Test 02/02/24  1043 12/27/23  0958 12/08/23  0600   WBC 9.2 8.0 7.5   HGB 10.9* 10.5* 9.6*   MCV 91 90 91    199 198     Most Recent 3 BMP's:  Recent Labs   Lab Test 02/16/24  1111 02/02/24  1043 01/02/24  1225 01/02/24  1136 01/02/24  0804    138  --   --  136   POTASSIUM 3.8 4.5  --   --  4.1   CHLORIDE 96* 97*  --   --  94*   CO2 31* 33*  --   --  34*   BUN 33.1* 46.2*  --   --  37.1*   CR 1.00* 1.04*  --   --  0.87   ANIONGAP 11 8  --   --  8   ZAKIA 10.4* 10.5*  --   --  11.0*   * 228* 208*   < > 232*    < > = values in this interval not displayed.     Most Recent 2 LFT's:  Recent Labs   Lab Test 11/17/23  1033 11/01/23  0613   AST 20 19   ALT 11 12   ALKPHOS 90 97   BILITOTAL 0.5 0.2     Most Recent 3 INR's:  Recent Labs   Lab Test 02/16/24  1111 01/02/24  0857 12/08/23  1226   INR 2.08* 1.47* 2.73*     Most Recent Cholesterol Panel:  Recent Labs   Lab Test 09/30/23  1620   CHOL 82   LDL 37   HDL 35*   TRIG 49     Most Recent TSH and T4:  Recent Labs   Lab Test 11/24/23  1012   TSH 3.46   T4 1.20     Most Recent Hemoglobin A1c:  Recent Labs   Lab Test 01/02/24  0804   A1C 8.7*     Most Recent Anemia Panel:  Recent Labs   Lab Test 02/02/24  1043   WBC 9.2   HGB 10.9*   HCT 34.6*   MCV 91          ASSESSMENT/PLAN:  (E11.621,  L97.509,  Z79.4) Type 2 diabetes mellitus with foot ulcer, with long-term  current use of insulin (H)  (E66.01) Obesity, Class III, BMI 40-49.9 (morbid obesity) (H)  Comment: Chronic. Last A1c on 1/2/24 was 8.7%. Goal <9%. Discussed history of being on metformin in the past. No longer on this and she does not know why. I suspect it may be due to GUSTAVO history. She reports being followed by endocrine and is wanting to see them again.   Plan:   -Monitor Blood Glucose QID as directed  -Continue Novlog sliding scale high coverage TID and at HS  -Continue tresiba 34 units at HS  -Advised her to schedule follow up with endocrinology and staff to assist with scheduling transportation. Pending appt. Follow up with Dr Remy Villareal with [facility] [phone]   -BMP, CBC, hgb A1c, and INR due 3/1/24              (I48.0) Paroxysmal atrial fibrillation (H)  (Z79.01) Long term (current) use of anticoagulants  (D68.32,  T45.515A) Warfarin-induced coagulopathy   (I10) Essential hypertension with goal blood pressure less than 140/90  (E78.2) Mixed hyperlipidemia  (N18.30) Stage 3 chronic kidney disease, unspecified whether stage 3a or 3b CKD (H)  (I50.812) Chronic right-sided heart failure (H)  Comment: Chronic. Baseline Creatinine~ 1-1.2. Based on JNC-8 goals,  patients age of 78 year old, presence of diabetes or CKD, and goals of care goal BP is  <140/90 mm Hg. Patient is stable with current plan of care and routine assessment.. INR goal 2-3. Last INR via fingerstick was 2.8, INR today was 2.1 via fingerstick with staff. Trending weight gain present despite adjustment of torsemide dose. Attempted to transition to furosemide with no relief. Did receive one time dose of metolazone 2.5mg on 2/22/24. Weight decreased down to 238.8# today. She is now open to resuming metolazone once a week for now until weight stabilizes.   Plan:   -Monitor for worsening s/symptoms of concerns  -Monitor BP and HR as directed  -Continue asa 81mg daily  -Continue metolazone 2.5mg once a week on Thursdays for now.   -Continue  atorvastatin 40mg daily  -Continue coumadin 2mg daily  -Monitor weights as directed daily. Baseline weight around 222-224# on admission but noted highest weight noted to be 242#. Weight today 238/8#  -Continue torsemide 40mg daily. HOLD if SBP<100  -Continue lymphedema therapy to bilateral lower extremities as directed.   -Would benefit from cardiology referral. Declines at this time as she believes she does not have CHF history.   -BMP, CBC, hgb A1c, and INR due 3/1/24     (R60.0) Edema of right extremity  (L53.9) Redness  Comment: Acute on chronic. Noted increased redness to RLE with warmth and pain soon after admission. Currently on coumadin. US negative for DVT risks. 4 areas areas are present to right leg. 3 anterior and 1 posterior (see photo above for reference). Suspect edema may also be contributing to wound concerns to RLE. Most areas are scabbed over except for left anterior shin area continues to weep clear fluid. No itch.   Plan:  -Continue Lymphedema therapy to assess and assist with edema control M-F on site.   -Follow up with wound clinic as directed. Scheduled for 2/26/24.   -Continue wound care to RLE for now: Cleanse with normal saline. Apply bactroban to scabbed areas and open area. Cover weeping area with calcium alignate then cover with folded ABD pad then wrap with kerlix. Perform daily with lymphedema therapy M-F compression and On weekends then Ace wrap to secure in place from toes to knee vs lymphedema. May benefit for unna boot?  -Monitor for worsening s/symptoms of concerns   -BMP, CBC, hgb A1c, and INR due 3/1/24     (E11.621,  L97.425) Diabetic ulcer of left heel associated with type 2 diabetes mellitus, with muscle involvement without evidence of necrosis (H)  (Z89.512) 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)  (Z98.890) Status post debridement  (M86.9) Osteomyelitis of left foot, unspecified type (H)  (F11.20) Opioid dependence, uncomplicated (H)  (G89.4) Chronic  pain syndrome  (I83.019,  I83.029,  L97.919,  L97.929) Venous stasis ulcers of both lower extremities (H)  (I89.0) Lymphedema  (Z94.5) History of skin graft placement on 1/2/24 as above  Comment: Chronic. S/p debridement 9/26/23. S/p Left guillotine BKA on 10/7/23. S/p tibial/fibular resection and debridement of stump 10/17/23. Followed by vascular. Completed course of meropenem/vancomycin 10/8 for osteomyelitis. Surgery completed 1/2/24 per review.   Plan:   -Monitor pain complaints  -Continue methadone 10mg daily at 1500 and 5mg TID scheduled.   -Tylenol PRN  -BMP, CBC, hgb A1c, and INR due 3/1/24  -Follow up with vascular as directed PRN. Prosthetic follow up as directed  -Lymphedema needs to left stump to assist with prosthetic needs MWF  -Aquaphor to thigh and stump BID    (K59.01) Constipation  Comment: Acute on chronic. Reports harder stools. Declines offer for scheduling agents.   Plan:  -Miralax BID PRN  -Senna BID PRN  -Monitor BM patterns      Patient pending to be assessed by half-way facility. Would HIGHLY recommend [facility] staff come out to assess patient in person to obtain a better picture for adequate assessment needs. Would recommend [facility] for patient at this time     Electronically Signed by:  Dr. Haylie Rowe DNP, APRN, FNP-C, WCS-C, EDS-C

## 2024-02-23 ENCOUNTER — NURSING HOME VISIT (OUTPATIENT)
Dept: GERIATRICS | Facility: CLINIC | Age: 79
End: 2024-02-23
Payer: COMMERCIAL

## 2024-02-23 VITALS
HEART RATE: 70 BPM | DIASTOLIC BLOOD PRESSURE: 59 MMHG | RESPIRATION RATE: 18 BRPM | HEIGHT: 66 IN | WEIGHT: 238.8 LBS | BODY MASS INDEX: 38.38 KG/M2 | SYSTOLIC BLOOD PRESSURE: 114 MMHG | TEMPERATURE: 98.8 F | OXYGEN SATURATION: 97 %

## 2024-02-23 DIAGNOSIS — K59.01 SLOW TRANSIT CONSTIPATION: ICD-10-CM

## 2024-02-23 DIAGNOSIS — L53.9 REDNESS: ICD-10-CM

## 2024-02-23 DIAGNOSIS — Z98.890 STATUS POST DEBRIDEMENT: ICD-10-CM

## 2024-02-23 DIAGNOSIS — T14.8XXA OPEN WOUND: ICD-10-CM

## 2024-02-23 DIAGNOSIS — Z79.4 TYPE 2 DIABETES MELLITUS WITH DIABETIC POLYNEUROPATHY, WITH LONG-TERM CURRENT USE OF INSULIN (H): ICD-10-CM

## 2024-02-23 DIAGNOSIS — M86.672 OTHER CHRONIC OSTEOMYELITIS OF LEFT FOOT (H): ICD-10-CM

## 2024-02-23 DIAGNOSIS — Z94.5 HX OF SKIN GRAFT: ICD-10-CM

## 2024-02-23 DIAGNOSIS — I73.9 PAD (PERIPHERAL ARTERY DISEASE) (H): ICD-10-CM

## 2024-02-23 DIAGNOSIS — Z79.01 LONG TERM (CURRENT) USE OF ANTICOAGULANTS: ICD-10-CM

## 2024-02-23 DIAGNOSIS — E66.01 SEVERE OBESITY (BMI 35.0-39.9) WITH COMORBIDITY (H): ICD-10-CM

## 2024-02-23 DIAGNOSIS — Z89.512 STATUS POST BELOW-KNEE AMPUTATION OF LEFT LOWER EXTREMITY (H): ICD-10-CM

## 2024-02-23 DIAGNOSIS — F11.20 OPIOID DEPENDENCE, UNCOMPLICATED (H): ICD-10-CM

## 2024-02-23 DIAGNOSIS — E66.01 SEVERE OBESITY (H): ICD-10-CM

## 2024-02-23 DIAGNOSIS — T45.515A WARFARIN-INDUCED COAGULOPATHY (H): ICD-10-CM

## 2024-02-23 DIAGNOSIS — D68.32 WARFARIN-INDUCED COAGULOPATHY (H): ICD-10-CM

## 2024-02-23 DIAGNOSIS — R63.5 WEIGHT GAIN: ICD-10-CM

## 2024-02-23 DIAGNOSIS — I89.0 LYMPHEDEMA: Primary | ICD-10-CM

## 2024-02-23 DIAGNOSIS — K59.03 DRUG-INDUCED CONSTIPATION: ICD-10-CM

## 2024-02-23 DIAGNOSIS — E78.5 DYSLIPIDEMIA: ICD-10-CM

## 2024-02-23 DIAGNOSIS — I48.19 PERSISTENT ATRIAL FIBRILLATION (H): ICD-10-CM

## 2024-02-23 DIAGNOSIS — D62 ACUTE BLOOD LOSS ANEMIA: ICD-10-CM

## 2024-02-23 DIAGNOSIS — I48.0 PAROXYSMAL ATRIAL FIBRILLATION (H): ICD-10-CM

## 2024-02-23 DIAGNOSIS — L98.9 SKIN LESION: ICD-10-CM

## 2024-02-23 DIAGNOSIS — N18.31 STAGE 3A CHRONIC KIDNEY DISEASE (H): ICD-10-CM

## 2024-02-23 DIAGNOSIS — I10 ESSENTIAL HYPERTENSION: ICD-10-CM

## 2024-02-23 DIAGNOSIS — L97.424 DIABETIC ULCER OF LEFT HEEL ASSOCIATED WITH TYPE 2 DIABETES MELLITUS, WITH NECROSIS OF BONE (H): ICD-10-CM

## 2024-02-23 DIAGNOSIS — I50.812 CHRONIC RIGHT-SIDED HEART FAILURE (H): ICD-10-CM

## 2024-02-23 DIAGNOSIS — E11.42 TYPE 2 DIABETES MELLITUS WITH DIABETIC POLYNEUROPATHY, WITH LONG-TERM CURRENT USE OF INSULIN (H): ICD-10-CM

## 2024-02-23 DIAGNOSIS — I87.8 VENOUS STASIS: ICD-10-CM

## 2024-02-23 DIAGNOSIS — E11.621 DIABETIC ULCER OF LEFT HEEL ASSOCIATED WITH TYPE 2 DIABETES MELLITUS, WITH NECROSIS OF BONE (H): ICD-10-CM

## 2024-02-23 PROCEDURE — 99309 SBSQ NF CARE MODERATE MDM 30: CPT | Performed by: NURSE PRACTITIONER

## 2024-02-23 RX ORDER — METOLAZONE 2.5 MG/1
2.5 TABLET ORAL WEEKLY
Qty: 4 TABLET | Refills: 2 | Status: ON HOLD | OUTPATIENT
Start: 2024-02-23 | End: 2024-07-16

## 2024-02-23 RX ORDER — MINERAL OIL/HYDROPHIL PETROLAT
OINTMENT (GRAM) TOPICAL
Start: 2024-02-23

## 2024-02-23 RX ORDER — AMOXICILLIN 250 MG
1 CAPSULE ORAL 2 TIMES DAILY PRN
Start: 2024-02-23

## 2024-02-23 NOTE — LETTER
2/23/2024        RE: Linda Loredo  [id number] [address] [facility] [address]        [facility] GERIATRICS    Chief Complaint   Patient presents with     RECHECK     HPI:  Linda Loredo is a 79 year old  (1945), who is being seen today for an episodic care visit at: [facility] ([facility] & ) (NF) [[id number]]. Today's concern is: The primary encounter diagnosis was Lymphedema. Diagnoses of Weight gain, Venous stasis, Essential hypertension, PAD (peripheral artery disease) (H24), Other chronic osteomyelitis of left foot (H), Open wound, Diabetic ulcer of left heel associated with type 2 diabetes mellitus, with necrosis of bone (H), 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), Acute blood loss anemia, Type 2 diabetes mellitus with diabetic polyneuropathy, with long-term current use of insulin (H), Dyslipidemia, Persistent atrial fibrillation (H), Skin lesion, Stage 3a chronic kidney disease (H), Severe obesity (BMI 35.0-39.9) with comorbidity (H), Paroxysmal atrial fibrillation (H), Warfarin-induced coagulopathy  (H24), Long term (current) use of anticoagulants, Slow transit constipation, Hx of skin graft, Opioid dependence, uncomplicated (H), Chronic right-sided heart failure (H), Severe obesity (H), Status post debridement, and Redness were also pertinent to this visit.    Met with patient who was found sitting upright in wheelchair. Patient is independent with stand pivot transfers in and out of wheelchair appropriately. She is able to utilize slide board for transfers independently if she needs as well. She is independent in wheelchair and able to propel self without concerns. She prefers to sleep upright in wheelchair but does elevate her RLE on stool. Did discuss option for recliner, but she reports trying a recliner in the past PTA and did not like it. She denies any chest pain, palpitations, shortness of breath, RASHID,  "lightheadedness, dizziness, or cough. Denies any abdominal discomfort. Denies N&V. Denies dysuria or frequency. Does report her stools have been on harder side. Has been utilizing PRN agents with good relief. LBM XL yesterday 2/22. Appetite good. Only consumes what daughter brings from home. Sleeping well. Chronic pain complaints but stable with current regimen. Patient pending to be assessed by long term facility. Would HIGHLY recommend long term staff come out to assess patient in person to obtain a better picture for adequate assessment needs. Would recommend long term for patient at this time    BP Readings from Last 3 Encounters:   02/22/24 114/59   02/15/24 126/66   02/08/24 114/65     Wt Readings from Last 5 Encounters:   02/23/24 108.3 kg (238 lb 12.8 oz)   02/15/24 109 kg (240 lb 3.2 oz)   02/08/24 106.1 kg (234 lb)   02/02/24 104.6 kg (230 lb 9.6 oz)   01/26/24 100.6 kg (221 lb 12.8 oz)     Allergies, and PMH/PSH reviewed in EPIC today.  REVIEW OF SYSTEMS:  4 point ROS including Respiratory, CV, GI and , other than that noted in the HPI,  is negative    Objective:   /59   Pulse 70   Temp 98.8  F (37.1  C)   Resp 18   Ht 1.676 m (5' 6\")   Wt 108.3 kg (238 lb 12.8 oz)   SpO2 97%   BMI 38.54 kg/m    GENERAL APPEARANCE:  Alert, in no distress, oriented, cooperative  ENT:  Mouth and posterior oropharynx normal, moist mucous membranes, normal hearing acuity  EYES:  EOM, conjunctivae, lids, pupils and irises normal  NECK:  No adenopathy,masses or thyromegaly  RESP:  respiratory effort and palpation of chest normal, lungs clear to auscultation , no respiratory distress  CV:  Palpation and auscultation of heart done , regular rate and rhythm, no murmur, rub, or gallop, peripheral edema 1+ in RLE, stump present to LLE  ABDOMEN:  normal bowel sounds, soft, nontender, no hepatosplenomegaly or other masses, no guarding or rebound  M/S:   independent transfers with stand pivot or slide board. Independent with wheelchair " needs.   SKIN:  wound healing well, no signs of infection see photos. Stump wound well healed with dry skin present. Skin graft site to left upper thigh well healed with pink skin present. Open areas noted to RLE, but no longer weeping and slowly improving in size. Patient is able to assist with wound care needs with minimal assistance by Registered Nurse for completion of RLE cares. See photos below  NEURO:   Cranial nerves 2-12 are normal tested and grossly at patient's baseline, no purposeful movement in upper and lower extremities  PSYCH:  oriented X 3, normal insight, judgement and memory, affect and mood normal                                      Most Recent 3 CBC's:  Recent Labs   Lab Test 02/02/24  1043 12/27/23  0958 12/08/23  0600   WBC 9.2 8.0 7.5   HGB 10.9* 10.5* 9.6*   MCV 91 90 91    199 198     Most Recent 3 BMP's:  Recent Labs   Lab Test 02/16/24  1111 02/02/24  1043 01/02/24  1225 01/02/24  1136 01/02/24  0804    138  --   --  136   POTASSIUM 3.8 4.5  --   --  4.1   CHLORIDE 96* 97*  --   --  94*   CO2 31* 33*  --   --  34*   BUN 33.1* 46.2*  --   --  37.1*   CR 1.00* 1.04*  --   --  0.87   ANIONGAP 11 8  --   --  8   ZAKIA 10.4* 10.5*  --   --  11.0*   * 228* 208*   < > 232*    < > = values in this interval not displayed.     Most Recent 2 LFT's:  Recent Labs   Lab Test 11/17/23  1033 11/01/23  0613   AST 20 19   ALT 11 12   ALKPHOS 90 97   BILITOTAL 0.5 0.2     Most Recent 3 INR's:  Recent Labs   Lab Test 02/16/24  1111 01/02/24  0857 12/08/23  1226   INR 2.08* 1.47* 2.73*     Most Recent Cholesterol Panel:  Recent Labs   Lab Test 09/30/23  1620   CHOL 82   LDL 37   HDL 35*   TRIG 49     Most Recent TSH and T4:  Recent Labs   Lab Test 11/24/23  1012   TSH 3.46   T4 1.20     Most Recent Hemoglobin A1c:  Recent Labs   Lab Test 01/02/24  0804   A1C 8.7*     Most Recent Anemia Panel:  Recent Labs   Lab Test 02/02/24  1043   WBC 9.2   HGB 10.9*   HCT 34.6*   MCV 91           ASSESSMENT/PLAN:  (E11.621,  L97.509,  Z79.4) Type 2 diabetes mellitus with foot ulcer, with long-term current use of insulin (H)  (E66.01) Obesity, Class III, BMI 40-49.9 (morbid obesity) (H)  Comment: Chronic. Last A1c on 1/2/24 was 8.7%. Goal <9%. Discussed history of being on metformin in the past. No longer on this and she does not know why. I suspect it may be due to GUSTAVO history. She reports being followed by endocrine and is wanting to see them again.   Plan:   -Monitor Blood Glucose QID as directed  -Continue Novlog sliding scale high coverage TID and at HS  -Continue tresiba 34 units at HS  -Advised her to schedule follow up with endocrinology and staff to assist with scheduling transportation. Pending appt. Follow up with Dr Remy Villareal with [facility] [phone]   -BMP, CBC, hgb A1c, and INR due 3/1/24              (I48.0) Paroxysmal atrial fibrillation (H)  (Z79.01) Long term (current) use of anticoagulants  (D68.32,  T45.515A) Warfarin-induced coagulopathy   (I10) Essential hypertension with goal blood pressure less than 140/90  (E78.2) Mixed hyperlipidemia  (N18.30) Stage 3 chronic kidney disease, unspecified whether stage 3a or 3b CKD (H)  (I50.812) Chronic right-sided heart failure (H)  Comment: Chronic. Baseline Creatinine~ 1-1.2. Based on JNC-8 goals,  patients age of 78 year old, presence of diabetes or CKD, and goals of care goal BP is  <140/90 mm Hg. Patient is stable with current plan of care and routine assessment.. INR goal 2-3. Last INR via fingerstick was 2.8, INR today was 2.1 via fingerstick with staff. Trending weight gain present despite adjustment of torsemide dose. Attempted to transition to furosemide with no relief. Did receive one time dose of metolazone 2.5mg on 2/22/24. Weight decreased down to 238.8# today. She is now open to resuming metolazone once a week for now until weight stabilizes.   Plan:   -Monitor for worsening s/symptoms of concerns  -Monitor BP and HR as  directed  -Continue asa 81mg daily  -Continue metolazone 2.5mg once a week on Thursdays for now.   -Continue atorvastatin 40mg daily  -Continue coumadin 2mg daily  -Monitor weights as directed daily. Baseline weight around 222-224# on admission but noted highest weight noted to be 242#. Weight today 238/8#  -Continue torsemide 40mg daily. HOLD if SBP<100  -Continue lymphedema therapy to bilateral lower extremities as directed.   -Would benefit from cardiology referral. Declines at this time as she believes she does not have CHF history.   -BMP, CBC, hgb A1c, and INR due 3/1/24     (R60.0) Edema of right extremity  (L53.9) Redness  Comment: Acute on chronic. Noted increased redness to RLE with warmth and pain soon after admission. Currently on coumadin. US negative for DVT risks. 4 areas areas are present to right leg. 3 anterior and 1 posterior (see photo above for reference). Suspect edema may also be contributing to wound concerns to RLE. Most areas are scabbed over except for left anterior shin area continues to weep clear fluid. No itch.   Plan:  -Continue Lymphedema therapy to assess and assist with edema control M-F on site.   -Follow up with wound clinic as directed. Scheduled for 2/26/24.   -Continue wound care to RLE for now: Cleanse with normal saline. Apply bactroban to scabbed areas and open area. Cover weeping area with calcium alignate then cover with folded ABD pad then wrap with kerlix. Perform daily with lymphedema therapy M-F compression and On weekends then Ace wrap to secure in place from toes to knee vs lymphedema. May benefit for unna boot?  -Monitor for worsening s/symptoms of concerns   -BMP, CBC, hgb A1c, and INR due 3/1/24     (E11.621,  L97.425) Diabetic ulcer of left heel associated with type 2 diabetes mellitus, with muscle involvement without evidence of necrosis (H)  (Z89.512) 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)  (Z98.890) Status post debridement  (M86.9)  Osteomyelitis of left foot, unspecified type (H)  (F11.20) Opioid dependence, uncomplicated (H)  (G89.4) Chronic pain syndrome  (I83.019,  I83.029,  L97.919,  L97.929) Venous stasis ulcers of both lower extremities (H)  (I89.0) Lymphedema  (Z94.5) History of skin graft placement on 1/2/24 as above  Comment: Chronic. S/p debridement 9/26/23. S/p Left guillotine BKA on 10/7/23. S/p tibial/fibular resection and debridement of stump 10/17/23. Followed by vascular. Completed course of meropenem/vancomycin 10/8 for osteomyelitis. Surgery completed 1/2/24 per review.   Plan:   -Monitor pain complaints  -Continue methadone 10mg daily at 1500 and 5mg TID scheduled.   -Tylenol PRN  -BMP, CBC, hgb A1c, and INR due 3/1/24  -Follow up with vascular as directed PRN. Prosthetic follow up as directed  -Lymphedema needs to left stump to assist with prosthetic needs MWF  -Aquaphor to thigh and stump BID    (K59.01) Constipation  Comment: Acute on chronic. Reports harder stools. Declines offer for scheduling agents.   Plan:  -Miralax BID PRN  -Senna BID PRN  -Monitor BM patterns      Patient pending to be assessed by long-term facility. Would HIGHLY recommend long-term staff come out to assess patient in person to obtain a better picture for adequate assessment needs. Would recommend long-term for patient at this time     Electronically Signed by:  Dr. Haylie Rowe DNP, APRN, FNP-C, WCS-C, EDS-C           Sincerely,        Haylie Rowe, MOE CNP

## 2024-02-26 ENCOUNTER — OFFICE VISIT (OUTPATIENT)
Dept: VASCULAR SURGERY | Facility: CLINIC | Age: 79
End: 2024-02-26
Attending: NURSE PRACTITIONER
Payer: COMMERCIAL

## 2024-02-26 VITALS — DIASTOLIC BLOOD PRESSURE: 89 MMHG | SYSTOLIC BLOOD PRESSURE: 146 MMHG | HEART RATE: 80 BPM | OXYGEN SATURATION: 99 %

## 2024-02-26 DIAGNOSIS — I73.9 PAD (PERIPHERAL ARTERY DISEASE) (H): ICD-10-CM

## 2024-02-26 DIAGNOSIS — T14.8XXA OPEN WOUND: ICD-10-CM

## 2024-02-26 DIAGNOSIS — L97.912 SKIN ULCER OF RIGHT LOWER LEG WITH FAT LAYER EXPOSED (H): Primary | ICD-10-CM

## 2024-02-26 DIAGNOSIS — R60.0 LOWER EXTREMITY EDEMA: ICD-10-CM

## 2024-02-26 DIAGNOSIS — I87.8 VENOUS STASIS: ICD-10-CM

## 2024-02-26 PROCEDURE — G0463 HOSPITAL OUTPT CLINIC VISIT: HCPCS | Performed by: FAMILY MEDICINE

## 2024-02-26 PROCEDURE — 99204 OFFICE O/P NEW MOD 45 MIN: CPT | Mod: 24 | Performed by: FAMILY MEDICINE

## 2024-02-26 ASSESSMENT — PAIN SCALES - GENERAL: PAINLEVEL: SEVERE PAIN (6)

## 2024-02-26 NOTE — PROGRESS NOTES
Wound Clinic Note         Visit date: 02/26/2024       Isabella Complaint:     Linda Loredo is a 79 year old  female had concerns including right leg wound .  The patient has lower extremity edema and right leg ulcers         HISTORY OF PRESENT ILLNESS:    Linda Loredo reports the wound has been present since early February 2024.  The wound began as a water blister that ruptured.   She reports she has had a number of wounds open and closed on both lower extremities.    She is currently living in a skilled nursing facility and has a lymphedema therapist who wraps her legs 5 days a week.  She then changes the bandages herself on the weekends.  Since today is Monday she comes into the wound clinic with the bandages that she is applied herself yesterday.  She bandaged the area with Aquacel, a dry dressing and an Ace wrap which was unraveling.        The pateint denies fevers or chills.  They report the pain from the wound has been 0/10 and has remained about the same recently.      The patient reports they currently do not have any routine for elevating their legs.  The patient confirms they do sleep sitting up in a recliner with their legs down in the dependent position.     Today the patient reports maintaining a regular diet without special attention to protein.        The patient denies a history of smoking or chronic steroid use.  The patient confirms having diabetes and reports the blood sugars are greater than 200 once every few days.         The patient has not had any symptoms of infection relating to the wound recently and is not currently on antibiotics.       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    Procedure: LEFT GUILLOTINE BELOW KNEE AMPUTATION.;  Surgeon: Lan Otto MD;  Location: [facility] OR    AMPUTATE LEG BELOW KNEE Left 10/14/2023    Procedure: debridement of left below the knee amputation;  Surgeon: Lan Otto MD;  Location:  OR    APPLY WOUND VAC Left 1/2/2024    Procedure: WOUND VAC APPLICATION TO LEFT BELOW KNEE STUMP;  Surgeon: Lan Otto MD;  Location:  OR    CHOLECYSTECTOMY      GRAFT SKIN SPLIT THICKNESS FROM TRUNK TO HEAD Left 1/2/2024    Procedure: APPLICATION OF SPLIT-THICKNESS SKIN GRAFT TO LEFT BELOW KNEE STUMP, GRAFT FROM LEFT THIGH;  Surgeon: Lan Otto MD;  Location: [facility] OR    INCISION AND DRAINAGE FOOT, COMBINED Left 9/26/2023    Procedure: Surgical debridement of all nonviable skin and soft tissue and bone left foot;  Surgeon: Nolberto Thorne DPM;  Location: [facility]    IR LOWER EXTREMITY ANGIOGRAM LEFT  10/3/2023    ligation of fallopian tube      OPEN REDUCTION INTERNAL FIXATION ANKLE  10/13/11    left    pinning of left 2nd toe            Allergies:    Allergies   Allergen Reactions    Prednisone Nausea and Vomiting    Morphine Nausea and Vomiting    Morphine [Fumaric Acid] Nausea and Vomiting              Medication History:    Current Outpatient Medications   Medication Sig    acetaminophen (TYLENOL) 325 MG tablet Take 2 tablets (650 mg) by mouth every 6 hours as needed for mild pain or  other (and adjunct with moderate or severe pain or per patient request)    aspirin 81 MG EC tablet Take 1 tablet (81 mg) by mouth daily    atorvastatin (LIPITOR) 40 MG tablet Take 1 tablet (40 mg) by mouth every evening    insulin aspart (NOVOLOG FLEXPEN) 100 UNIT/ML pen If Pre-meal Glucose: 140 -164 give 1 unit, 165 -189 give 2 units, 190 -214 give 3 units, 215 -239 give 4 units, 240 -264 give 5 units, 265 -289 give 6 units, 290 -314 give 7 units, 315 -339 give 8 units, 340+ give 9 units. If Bedtime Glucose: 200 -214 give 1 unit, 215 -264 give 2 units, 265 -314 give 3 units, 315+ give 4 units.    insulin degludec (TRESIBA) 200 UNIT/ML pen Inject 34 Units Subcutaneous at bedtime Hold for blood glucose less than 100    methadone (DOLOPHINE) 10 MG tablet Take 1 tablet (10 mg) by mouth daily Daily at 1500    methadone (DOLOPHINE) 5 MG tablet Take 1 tablet (5 mg) by mouth 3 times daily    metolazone (ZAROXOLYN) 2.5 MG tablet Take 1 tablet (2.5 mg) by mouth once a week Give on thursdays    miconazole (MICATIN) 2 % external powder Apply topically 2 times daily Apply to buttock and fungal areas BID and BID PRN    mineral oil-hydrophilic petrolatum (AQUAPHOR) external ointment Apply to left thigh wound BID    multivitamin w/minerals (THERA-VIT-M) tablet Take 1 tablet by mouth daily    mupirocin (BACTROBAN) 2 % external ointment Apply topically 2 times daily Apply to RLE lesions    ondansetron (ZOFRAN ODT) 4 MG ODT tab Take 1 tablet (4 mg) by mouth every 6 hours as needed for nausea or vomiting    polyethylene glycol (MIRALAX) 17 GM/Dose powder Take 17 g by mouth 2 times daily as needed for constipation    senna-docusate (SENOKOT-S/PERICOLACE) 8.6-50 MG tablet Take 1 tablet by mouth 2 times daily as needed for constipation    torsemide (DEMADEX) 20 MG tablet Take 2 tablets (40 mg) by mouth daily HOLD if SBP<100    Warfarin Therapy Reminder Per INR     No current facility-administered medications for this visit.          Tobacco History:  reports that she has never smoked. She has never used smokeless tobacco.       REVIEW OF SYMPTOMS:   The review of systems was negative except as noted in the HPI.           PHYSICAL EXAMINATION:     BP (!) 146/89   Pulse 80   SpO2 99%            GENERAL: The patient overall appears well and is no acute distress.   HEAD: normocephalic   EYES: Sclera and conjunctiva clear   NECK: no obvious masses   LUNGS: breathing is unlabored.   EXTREMITIES: No clubbing, cyanosis or edema   SKIN: No rashes or other abnormalities except as noted under the Wound section below.   NEUROLOGICAL: normal motor and sensory function   EDEMA: Sever       WOUND: The wound appears healthy with no sign of infection.   Wound bed: granulation tissue  Periwound: healthy intact skin  There is some erythema of the right leg however the patient and her daughter confirm that this is the same color that her skin has been for at least the last month and actually was more red before.  The skin is not more warm to the touch than her other skin.  This looks consistent with lower extremity venous congestion.  The 2 wounds that she has on the right medial leg are consistent with ruptured water blisters and are very superficial.      Also see below for wound details:       Circumferential volume measures:             No data to display                Ulceration(s)/Wound(s):   Please see the media tab under the chart review for pictures of the wounds.  Nursing staff removed dressings and cleansed wound.    VASC Wound right medial leg (Active)   Pre Size Length 1 02/26/24 1200   Pre Size Width 1 02/26/24 1200   Pre Size Depth 0.1 02/26/24 1200   Pre Total Sq cm 1 02/26/24 1200       VASC Wound right posterior leg (Active)   Pre Size Length 2 02/26/24 1200   Pre Size Width 2 02/26/24 1200   Pre Size Depth 1 02/26/24 1200   Pre Total Sq cm 4 02/26/24 1200       VASC Wound left BKA (Active)   Description blister 02/26/24 1200              Recent Labs   Lab Test 01/02/24  0804 12/08/23  1226 09/23/23  0533   A1C 8.7* 7.6* 7.0*          Recent Labs   Lab Test 11/17/23  1033 11/01/23  0613 09/22/23  1941   ALBUMIN 3.8 3.2* 3.1*         WBC   Date Value Ref Range Status   02/22/2008 9.4 4.0 - 11.0 10e9/L Final     WBC Count   Date Value Ref Range Status   02/02/2024 9.2 4.0 - 11.0 10e3/uL Final     Albumin   Date Value Ref Range Status   11/17/2023 3.8 3.5 - 5.2 g/dL Final   02/22/2008 4.1 3.2 - 4.5 g/dL Final           No sharp debridement performed today.                  ASSESSMENT:   This is a 79 year old  female with right leg ulcers, the patient also has lower extremity edema which was also managed during today's clinic visit.          PLAN:   We will bandage the area with alginate, Mepilex bandage and the lymphedema wrap change 3 times a week by the lymphedema therapist at her facility.  If she comes into the clinic with the lymphedema wraps on at her next clinic visit we can measure her for a pair of Velcro compression garments to use long-term to control her swelling.    Separate from the wound care instructions we then discussed management strategies for lower extremity edema.  I explained the keys for managing lower extremity edema are compression and elevation.  I explained to the patient today that controlling the edema is probably the most important thing we can do to help heal the wound.  I have specifically recommended that they lay down with their legs above the level of the heart for 30 minutes at least twice a day.  I also explained to the patient that sleeping flat is also very important for controlling the edema.  We discussed several things they can try to get their legs elevate better at night.     I have explained to the patient the importance of protein intake to wound healing.  I have explained that increasing protein intake will speed wound healing.  We discussed several types of food that are high in protein and the wound  care nurse gave the patient a handout that summarizes this information.  In addition to further speed wound healing I have encouraged the patient to take a protein supplement.   The patient will return to the wound clinic in 3 weeks to see me again.        45 minutes spent on the date of the encounter doing chart review, history and exam, documentation and further activities per the note, this time excludes any procedure time      Luis E Morley MD  02/26/2024   1:34 PM   [facility]  [phone]    This note was electronically signed by Luis E Morley MD

## 2024-02-26 NOTE — PATIENT INSTRUCTIONS
Wound Care Instructions    THREE TIMES A WEEK and as needed, Cleanse your RLE wound(s) with Normal saline.    Pat Dry with non-sterile gauze    Apply Lotion to the intact skin surrounding your wound and other dry skin locations. Some good lotions include: Remedy Skin Repair Cream, Sarna, Vanicream or Cetaphil    Primary Dressing: Apply Aquacel Ag into/onto the wounds    Secondary dressing: Cover with silicone foam    Compression: per lymphedema    It is ok to get your wound wet in the bath or shower    It is recommended that you elevate your legs throughout the day: approx 2-3 times each day  Elevate them above the level of your heart for 30 min.   Ways to do this:   Lay on the couch or your bed and prop your legs up on pillows   Recline back as far as you can go in your recliner and prop your legs on pillows.     Doing these things will help reduce the edema in your legs.     High Protein Foods  When you have an open ulcer, your bodies protein needs are much higher, so it is recommended eat good sources of protein or take a protein supplement!    Protein Supplements  -Premier Protein  -Ensure  -Boost  -Glucerna, if diabetic    Chicken  -Chicken breast, 3.5oz.-30 grams protein  -Chicken meat, cooked, 4 oz.    Beef  -Hamburger jonathan, 4 oz-28 grams protein  -Steak, 6 oz-42 grams  -Most cuts of beef- 7 grams of protein per ounce    Fish  -Most fish fillets or steaks are about 22 grams of protein for 3 1/2 oz(100 grams) of cooked fish, or 6 grams per ounce  -Tuna, 6 oz can-40 grams of protein    Pork  -Pork chop, average-22 grams protein  -Pork loin or tenderloin, 4 oz.-29 grams  -Ham, 3oz serving- 19 grams  -Wilkes, 1 slice-3 grams    Eggs and Dairy  -Egg, large-7 grams  -Milk, 1 cup-8 grams  -Cottage cheese, 1/2 cup-15 grams  -Greek yogurt, 1 cup-usually 8-12 grams, check label    Beans  -Soy milk, 1 cup-6-10 grams  -Most beans(black, harmon, lentils, etc.) about 7-10 grams protein per half cup of cooked  beans    Nuts and Seeds  -Peanut butter, 2 Tablespoons- 8 grams protein  -Almonds, 1/4 cup- 8 grams  -Peanuts, 1/4 cup-9 grams  -Sunflower seeds, 1/4 cup- 6 grams       If for some reason you are not able to get your dressing(s) changed as outlined above (due to illness, lack of supplies, lack of help) please do the following: remove old, soiled dressings; wash the wounds with saline; pat dry; apply ABD pad or other absorbant pad and secure with rolled gauze; avoid tape directly on your skin; Call the clinic as soon as possible to let us know what the current issues are in receiving wound care [phone].      SEEK MEDICAL CARE IF:  You have an increase in swelling, pain, or redness around the wound.  You have an increase in the amount of pus coming from the wound.  There is a bad smell coming from the wound.  The wound appears to be worsening/enlarging  You have a fever greater than 101.5 F      It is ok to continue current wound care treatment/products for the next 2-3 days until new wound care supplies are ordered and arrive. If longer than this please contact our office at [phone].    If you have a 2 layer or 4 layer compression wrap on these are safe to have on for ONLY 7 days. If for some reason you are not able to get the wrap(s) changed (due to illness; lack of supplies, lack of help, lack of transportation) please do the following: unwrap the old 2 or 4 layer compression wrap; avoid using scissors as you could cut your skin and cause wounds; use tubular compression when available. Call to reschedule your home care or clinic visit appointment as soon as possible.    Please NOTE: if you are 15 minutes late to your clinic appointment you will have to be rescheduled. Please call our clinic as soon as possible if you know you will not be able to get to your appointment at [phone].    If you fail to show up to 3 scheduled clinic appointments you will be dismissed from our clinic.              We want  to hear from you!  In the next few weeks, you should receive a call or email to complete a survey about your visit at [facility]. Please help us improve your appointment experience by letting us know how we did today. We strive to make your experience good and value any ways in which we could do better.      We value your input and suggestions.    Thank you for choosing the [facility]!

## 2024-02-29 ENCOUNTER — LAB REQUISITION (OUTPATIENT)
Dept: LAB | Facility: CLINIC | Age: 79
End: 2024-02-29
Payer: COMMERCIAL

## 2024-02-29 DIAGNOSIS — E11.29 TYPE 2 DIABETES MELLITUS WITH OTHER DIABETIC KIDNEY COMPLICATION (H): ICD-10-CM

## 2024-02-29 DIAGNOSIS — N18.30 CHRONIC KIDNEY DISEASE, STAGE 3 UNSPECIFIED (H): ICD-10-CM

## 2024-02-29 DIAGNOSIS — I10 ESSENTIAL (PRIMARY) HYPERTENSION: ICD-10-CM

## 2024-02-29 DIAGNOSIS — I48.0 PAROXYSMAL ATRIAL FIBRILLATION (H): ICD-10-CM

## 2024-02-29 DIAGNOSIS — D64.9 ANEMIA, UNSPECIFIED: ICD-10-CM

## 2024-02-29 NOTE — PROGRESS NOTES
[facility] GERIATRICS    Chief Complaint   Patient presents with    RECHECK     HPI:  Linda Loredo is a 79 year old  (1945), who is being seen today for an episodic care visit at: [facility] ([facility] & ) (NF) [[id number]]. Today's concern is: The primary encounter diagnosis was Lymphedema. Diagnoses of Weight gain, Venous stasis, Essential hypertension, PAD (peripheral artery disease) (H24), Other chronic osteomyelitis of left foot (H), Open wound, Diabetic ulcer of left heel associated with type 2 diabetes mellitus, with necrosis of bone (H), 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), Acute blood loss anemia, Type 2 diabetes mellitus with diabetic polyneuropathy, with long-term current use of insulin (H), Dyslipidemia, Persistent atrial fibrillation (H), Stage 3a chronic kidney disease (H), Severe obesity (BMI 35.0-39.9) with comorbidity (H), Paroxysmal atrial fibrillation (H), Warfarin-induced coagulopathy  (H24), Long term (current) use of anticoagulants, Slow transit constipation, Hx of skin graft, Opioid dependence, uncomplicated (H), Chronic right-sided heart failure (H), Severe obesity (H), and Status post debridement were also pertinent to this visit.    Met with patient who denies any chest pain, palpitations, shortness of breath, RASHID, lightheadedness, dizziness, or cough. Denies any abdominal discomfort. Denies N&V. Denies B&B concerns. Denies dysuria or frequency. Denies loose or constipation. Appetite good. Sleeping well. No further weeping noted to RLE. No evidence of infection noted. Slowly improving per appearance. She reports going out to [facility] yesterday for interview with hopes of being accepted to transition to [facility] soon. [facility] refused her originally however they never came out to assess her in person, therefore she filed an appeal with them and went up in person for interview to be thoroughly assessed and  "reviewed to see if they can accept her given her improved health status. Patient reports Soham  came out today and provided a new orthotic shrinkers to be used during the day and night with appropriate wear program to assist with edema and to obtain her prosthetic when ready. Soham to continue to follow.     BP Readings from Last 3 Encounters:   03/01/24 105/58   02/26/24 (!) 146/89   02/22/24 114/59     Wt Readings from Last 5 Encounters:   03/01/24 109.5 kg (241 lb 6.4 oz)   02/23/24 108.3 kg (238 lb 12.8 oz)   02/15/24 109 kg (240 lb 3.2 oz)   02/08/24 106.1 kg (234 lb)   02/02/24 104.6 kg (230 lb 9.6 oz)     Allergies, and PMH/PSH reviewed in EPIC today.  REVIEW OF SYSTEMS:  4 point ROS including Respiratory, CV, GI and , other than that noted in the HPI,  is negative    Objective:   /58   Pulse 74   Temp 98.4  F (36.9  C)   Resp 17   Ht 1.676 m (5' 6\")   Wt 109.5 kg (241 lb 6.4 oz)   SpO2 99%   BMI 38.96 kg/m    GENERAL APPEARANCE:  Alert, in no distress, oriented, morbidly obese, cooperative  ENT:  Mouth and posterior oropharynx normal, moist mucous membranes, normal hearing acuity  EYES:  EOM, conjunctivae, lids, pupils and irises normal  NECK:  No adenopathy,masses or thyromegaly  RESP:  respiratory effort and palpation of chest normal, lungs clear to auscultation , no respiratory distress  CV:  Palpation and auscultation of heart done , regular rate and rhythm, no murmur, rub, or gallop, peripheral edema 2+ in RLE. Stump present to LLE  ABDOMEN:  normal bowel sounds, soft, nontender, no hepatosplenomegaly or other masses, no guarding or rebound  M/S:   Self transfers. Slide board use or stand pivot. Wheelchair bound  SKIN:  Inspection of skin and subcutaneous tissue baseline, wound healing well, no signs of infection see photo  NEURO:   Cranial nerves 2-12 are normal tested and grossly at patient's baseline, no purposeful movement in upper and lower extremities  PSYCH:  oriented X 3, " normal insight, judgement and memory, affect and mood normal        Most Recent 3 CBC's:  Recent Labs   Lab Test 03/01/24  1309 02/02/24  1043 12/27/23  0958   WBC 7.0 9.2 8.0   HGB 10.3* 10.9* 10.5*   MCV 92 91 90    200 199     Most Recent 3 BMP's:  Recent Labs   Lab Test 02/16/24  1111 02/02/24  1043 01/02/24  1225 01/02/24  1136 01/02/24  0804    138  --   --  136   POTASSIUM 3.8 4.5  --   --  4.1   CHLORIDE 96* 97*  --   --  94*   CO2 31* 33*  --   --  34*   BUN 33.1* 46.2*  --   --  37.1*   CR 1.00* 1.04*  --   --  0.87   ANIONGAP 11 8  --   --  8   ZAKIA 10.4* 10.5*  --   --  11.0*   * 228* 208*   < > 232*    < > = values in this interval not displayed.     Most Recent 2 LFT's:  Recent Labs   Lab Test 11/17/23  1033 11/01/23  0613   AST 20 19   ALT 11 12   ALKPHOS 90 97   BILITOTAL 0.5 0.2     Most Recent 3 INR's:  Recent Labs   Lab Test 03/01/24  1319 02/16/24  1111 01/02/24  0857   INR 1.64* 2.08* 1.47*     Most Recent Hemoglobin A1c:  Recent Labs   Lab Test 03/01/24  1319   A1C 9.9*     Most Recent Anemia Panel:  Recent Labs   Lab Test 03/01/24  1309   WBC 7.0   HGB 10.3*   HCT 32.9*   MCV 92          ASSESSMENT/PLAN:  (E11.621,  L97.509,  Z79.4) Type 2 diabetes mellitus with foot ulcer, with long-term current use of insulin (H)  (E66.01) Obesity, Class III, BMI 40-49.9 (morbid obesity) (H)  Comment: Chronic. Last A1c  today 9.9%. Goal <9%. Discussed history of being on metformin in the past. No longer on this and she does not know why. I suspect it may be due to GUSTAVO history. She reports being followed by endocrine and is wanting to see them again.   Plan:   -Monitor Blood Glucose QID as directed  -Continue Novlog sliding scale high coverage TID and at HS  -Increase tresiba to 40 units at HS  -Advised her to schedule follow up with endocrinology and staff to assist with scheduling transportation. Pending appt. Follow up with Dr Remy Villareal with [facility] [phone]   -BMP, CBC and  INR due today 3/1/24  -Trend hgb A1c in 3 months. Due June 2024  -Trend labs periodically               (I48.0) Paroxysmal atrial fibrillation (H)  (Z79.01) Long term (current) use of anticoagulants  (D68.32,  T45.515A) Warfarin-induced coagulopathy   (I10) Essential hypertension with goal blood pressure less than 140/90  (E78.2) Mixed hyperlipidemia  (N18.30) Stage 3 chronic kidney disease, unspecified whether stage 3a or 3b CKD (H)  (I50.812) Chronic right-sided heart failure (H)  (R63.5) Weight gain  Comment: Chronic. Baseline Creatinine~ 1-1.2. Based on JNC-8 goals,  patients age of 78 year old, presence of diabetes or CKD, and goals of care goal BP is  <140/90 mm Hg. Patient is stable with current plan of care and routine assessment.. INR goal 2-3. Last INR via fingerstick was 2.1, INR today was 1.6 via fingerstick with staff, however machine malfunction so unknown if accurate. Per labs INR today 1.64. Trending weight gain present despite adjustment of torsemide dose. Attempted to transition to furosemide with no relief. Did receive one time dose of metolazone 2.5mg on 2/22/24. Weight decreased down to 239# today. She is now open to resuming metolazone once a week for now until weight stabilizes.   Plan:   -Monitor for worsening s/symptoms of concerns  -Monitor BP and HR as directed  -Continue asa 81mg daily  -Continue metolazone 2.5mg once a week on Thursdays for now.   -Continue atorvastatin 40mg daily  -Change coumadin to 2.5mg daily. Recheck INR in 1 week on 3/8/24  -Monitor weights as directed daily. Baseline weight around 222-224# on admission but noted highest weight noted to be 242#. Weight today 239#  -Continue torsemide 40mg daily. HOLD if SBP<100  -Continue lymphedema therapy to bilateral lower extremities as directed.   -Would benefit from cardiology referral. Declines at this time as she believes she does not have CHF history.   -BMP, CBC and INR due today 3/1/24  -Trend labs periodically       (I89.0) Lymphedema  (I87.8) Venous stasis  Comment: Acute on chronic. Suspect edema may also be contributing to wound concerns to RLE. Seen by wound provider in clinic on 2/26/24.   Plan:  -Continue Lymphedema therapy to assess and assist with edema control M-F on site.   -Discontinue mupirocin  -Follow up with wound clinic as directed. Scheduled for 2/26/24.   -WOUND CARE TO RLE: Three times a week and as needed. Cleanse RLE wounds with Normal saline. Pat dry with non-sterile gauze. Apply lotion to the intact skin surrounding your wound and other dry skin locations. Some good options include Remedy skin repair Cream, Sarna, Vanicream and Cetaphil. Primary dressing: Apply Aquacel Ag into/onto wounds. Secondary dressing: Cover with silicone foam. Compression per lymphedema.  -Monitor for worsening s/symptoms of concerns   -BMP, CBC and INR due today 3/1/24  -Trend labs periodically      (E11.621,  L97.425) Diabetic ulcer of left heel associated with type 2 diabetes mellitus, with muscle involvement without evidence of necrosis (H)  (Z89.512) 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)  (Z98.890) Status post debridement  (M86.9) Osteomyelitis of left foot, unspecified type (H)  (F11.20) Opioid dependence, uncomplicated (H)  (G89.4) Chronic pain syndrome  (I83.019,  I83.029,  L97.919,  L97.929) Venous stasis ulcers of both lower extremities (H)  (I89.0) Lymphedema  (Z94.5) History of skin graft placement on 1/2/24 as above  Comment: Chronic. S/p debridement 9/26/23. S/p Left guillotine BKA on 10/7/23. S/p tibial/fibular resection and debridement of stump 10/17/23. Followed by vascular. Completed course of meropenem/vancomycin 10/8 for osteomyelitis. Surgery completed 1/2/24 per review.   Plan:   -Monitor pain complaints  -Continue methadone 10mg daily at 1500 and 5mg TID scheduled.   -Tylenol PRN  -Continue orthotic lymph  needs as directed and advised per Soham in preparation for  prosthetic.   -Follow up with vascular as directed PRN. Prosthetic follow up as directed  -Lymphedema needs to left stump to assist with prosthetic needs MWF  -Aquaphor to thigh and stump BID  -BMP, CBC and INR due today 3/1/24  -Trend labs periodically      (K59.01) Constipation  Comment: Acute on chronic. Reports harder stools. Declines offer for scheduling agents.   Plan:  -Miralax BID PRN  -Senna BID PRN  -Monitor BM patterns      Electronically Signed by:  Dr. Haylie Rowe DNP, APRN, FNP-C, WCS-C, EDS-C

## 2024-03-01 ENCOUNTER — NURSING HOME VISIT (OUTPATIENT)
Dept: GERIATRICS | Facility: CLINIC | Age: 79
End: 2024-03-01
Payer: COMMERCIAL

## 2024-03-01 VITALS
HEIGHT: 66 IN | DIASTOLIC BLOOD PRESSURE: 58 MMHG | SYSTOLIC BLOOD PRESSURE: 105 MMHG | RESPIRATION RATE: 17 BRPM | WEIGHT: 241.4 LBS | OXYGEN SATURATION: 99 % | HEART RATE: 74 BPM | BODY MASS INDEX: 38.8 KG/M2 | TEMPERATURE: 98.4 F

## 2024-03-01 DIAGNOSIS — T45.515A WARFARIN-INDUCED COAGULOPATHY (H): ICD-10-CM

## 2024-03-01 DIAGNOSIS — E11.621 DIABETIC ULCER OF LEFT HEEL ASSOCIATED WITH TYPE 2 DIABETES MELLITUS, WITH NECROSIS OF BONE (H): ICD-10-CM

## 2024-03-01 DIAGNOSIS — N18.31 STAGE 3A CHRONIC KIDNEY DISEASE (H): ICD-10-CM

## 2024-03-01 DIAGNOSIS — E66.01 SEVERE OBESITY (H): ICD-10-CM

## 2024-03-01 DIAGNOSIS — F11.20 OPIOID DEPENDENCE, UNCOMPLICATED (H): ICD-10-CM

## 2024-03-01 DIAGNOSIS — Z94.5 HX OF SKIN GRAFT: ICD-10-CM

## 2024-03-01 DIAGNOSIS — D68.32 WARFARIN-INDUCED COAGULOPATHY (H): ICD-10-CM

## 2024-03-01 DIAGNOSIS — Z98.890 STATUS POST DEBRIDEMENT: ICD-10-CM

## 2024-03-01 DIAGNOSIS — I48.0 PAROXYSMAL ATRIAL FIBRILLATION (H): ICD-10-CM

## 2024-03-01 DIAGNOSIS — Z79.4 TYPE 2 DIABETES MELLITUS WITH DIABETIC POLYNEUROPATHY, WITH LONG-TERM CURRENT USE OF INSULIN (H): ICD-10-CM

## 2024-03-01 DIAGNOSIS — I50.812 CHRONIC RIGHT-SIDED HEART FAILURE (H): ICD-10-CM

## 2024-03-01 DIAGNOSIS — E11.42 TYPE 2 DIABETES MELLITUS WITH DIABETIC POLYNEUROPATHY, WITH LONG-TERM CURRENT USE OF INSULIN (H): ICD-10-CM

## 2024-03-01 DIAGNOSIS — I73.9 PAD (PERIPHERAL ARTERY DISEASE) (H): ICD-10-CM

## 2024-03-01 DIAGNOSIS — I48.19 PERSISTENT ATRIAL FIBRILLATION (H): ICD-10-CM

## 2024-03-01 DIAGNOSIS — I87.8 VENOUS STASIS: ICD-10-CM

## 2024-03-01 DIAGNOSIS — R63.5 WEIGHT GAIN: ICD-10-CM

## 2024-03-01 DIAGNOSIS — Z79.01 LONG TERM (CURRENT) USE OF ANTICOAGULANTS: ICD-10-CM

## 2024-03-01 DIAGNOSIS — T14.8XXA OPEN WOUND: ICD-10-CM

## 2024-03-01 DIAGNOSIS — K59.01 SLOW TRANSIT CONSTIPATION: ICD-10-CM

## 2024-03-01 DIAGNOSIS — E78.5 DYSLIPIDEMIA: ICD-10-CM

## 2024-03-01 DIAGNOSIS — D62 ACUTE BLOOD LOSS ANEMIA: ICD-10-CM

## 2024-03-01 DIAGNOSIS — I89.0 LYMPHEDEMA: Primary | ICD-10-CM

## 2024-03-01 DIAGNOSIS — Z89.512 STATUS POST BELOW-KNEE AMPUTATION OF LEFT LOWER EXTREMITY (H): ICD-10-CM

## 2024-03-01 DIAGNOSIS — M86.672 OTHER CHRONIC OSTEOMYELITIS OF LEFT FOOT (H): ICD-10-CM

## 2024-03-01 DIAGNOSIS — L97.424 DIABETIC ULCER OF LEFT HEEL ASSOCIATED WITH TYPE 2 DIABETES MELLITUS, WITH NECROSIS OF BONE (H): ICD-10-CM

## 2024-03-01 DIAGNOSIS — I10 ESSENTIAL HYPERTENSION: ICD-10-CM

## 2024-03-01 DIAGNOSIS — E66.01 SEVERE OBESITY (BMI 35.0-39.9) WITH COMORBIDITY (H): ICD-10-CM

## 2024-03-01 LAB
BASOPHILS # BLD AUTO: 0 10E3/UL (ref 0–0.2)
BASOPHILS NFR BLD AUTO: 1 %
EOSINOPHIL # BLD AUTO: 0.2 10E3/UL (ref 0–0.7)
EOSINOPHIL NFR BLD AUTO: 3 %
ERYTHROCYTE [DISTWIDTH] IN BLOOD BY AUTOMATED COUNT: 15 % (ref 10–15)
HBA1C MFR BLD: 9.9 %
HCT VFR BLD AUTO: 32.9 % (ref 35–47)
HGB BLD-MCNC: 10.3 G/DL (ref 11.7–15.7)
IMM GRANULOCYTES # BLD: 0 10E3/UL
IMM GRANULOCYTES NFR BLD: 0 %
INR PPP: 1.64 (ref 0.85–1.15)
LYMPHOCYTES # BLD AUTO: 1.5 10E3/UL (ref 0.8–5.3)
LYMPHOCYTES NFR BLD AUTO: 21 %
MCH RBC QN AUTO: 28.9 PG (ref 26.5–33)
MCHC RBC AUTO-ENTMCNC: 31.3 G/DL (ref 31.5–36.5)
MCV RBC AUTO: 92 FL (ref 78–100)
MONOCYTES # BLD AUTO: 0.7 10E3/UL (ref 0–1.3)
MONOCYTES NFR BLD AUTO: 9 %
NEUTROPHILS # BLD AUTO: 4.6 10E3/UL (ref 1.6–8.3)
NEUTROPHILS NFR BLD AUTO: 66 %
NRBC # BLD AUTO: 0 10E3/UL
NRBC BLD AUTO-RTO: 0 /100
PLATELET # BLD AUTO: 178 10E3/UL (ref 150–450)
RBC # BLD AUTO: 3.57 10E6/UL (ref 3.8–5.2)
WBC # BLD AUTO: 7 10E3/UL (ref 4–11)

## 2024-03-01 PROCEDURE — 85610 PROTHROMBIN TIME: CPT | Performed by: NURSE PRACTITIONER

## 2024-03-01 PROCEDURE — 80048 BASIC METABOLIC PNL TOTAL CA: CPT | Mod: ORL | Performed by: NURSE PRACTITIONER

## 2024-03-01 PROCEDURE — 99309 SBSQ NF CARE MODERATE MDM 30: CPT | Performed by: NURSE PRACTITIONER

## 2024-03-01 PROCEDURE — 83036 HEMOGLOBIN GLYCOSYLATED A1C: CPT | Performed by: NURSE PRACTITIONER

## 2024-03-01 PROCEDURE — 85025 COMPLETE CBC W/AUTO DIFF WBC: CPT | Mod: ORL | Performed by: NURSE PRACTITIONER

## 2024-03-01 PROCEDURE — 36415 COLL VENOUS BLD VENIPUNCTURE: CPT | Performed by: NURSE PRACTITIONER

## 2024-03-01 NOTE — LETTER
3/1/2024        RE: Linda Loredo  [address] [address] [address]        [facility] GERIATRICS    Chief Complaint   Patient presents with     RECHECK     HPI:  Linda Loredo is a 79 year old  (1945), who is being seen today for an episodic care visit at: [facility] ([facility] & ) (NF) [[id number]]. Today's concern is: The primary encounter diagnosis was Lymphedema. Diagnoses of Weight gain, Venous stasis, Essential hypertension, PAD (peripheral artery disease) (H24), Other chronic osteomyelitis of left foot (H), Open wound, Diabetic ulcer of left heel associated with type 2 diabetes mellitus, with necrosis of bone (H), 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), Acute blood loss anemia, Type 2 diabetes mellitus with diabetic polyneuropathy, with long-term current use of insulin (H), Dyslipidemia, Persistent atrial fibrillation (H), Stage 3a chronic kidney disease (H), Severe obesity (BMI 35.0-39.9) with comorbidity (H), Paroxysmal atrial fibrillation (H), Warfarin-induced coagulopathy  (H24), Long term (current) use of anticoagulants, Slow transit constipation, Hx of skin graft, Opioid dependence, uncomplicated (H), Chronic right-sided heart failure (H), Severe obesity (H), and Status post debridement were also pertinent to this visit.    Met with patient who denies any chest pain, palpitations, shortness of breath, RASHID, lightheadedness, dizziness, or cough. Denies any abdominal discomfort. Denies N&V. Denies B&B concerns. Denies dysuria or frequency. Denies loose or constipation. Appetite good. Sleeping well. No further weeping noted to RLE. No evidence of infection noted. Slowly improving per appearance. She reports going out to [facility] yesterday for interview with hopes of being accepted to transition to [facility] soon. [facility] refused her originally however they never came out to assess her in person,  "therefore she filed an appeal with them and went up in person for interview to be thoroughly assessed and reviewed to see if they can accept her given her improved health status. Patient reports Soham  came out today and provided a new orthotic shrinkers to be used during the day and night with appropriate wear program to assist with edema and to obtain her prosthetic when ready. Soham to continue to follow.     BP Readings from Last 3 Encounters:   03/01/24 105/58   02/26/24 (!) 146/89   02/22/24 114/59     Wt Readings from Last 5 Encounters:   03/01/24 109.5 kg (241 lb 6.4 oz)   02/23/24 108.3 kg (238 lb 12.8 oz)   02/15/24 109 kg (240 lb 3.2 oz)   02/08/24 106.1 kg (234 lb)   02/02/24 104.6 kg (230 lb 9.6 oz)     Allergies, and PMH/PSH reviewed in EPIC today.  REVIEW OF SYSTEMS:  4 point ROS including Respiratory, CV, GI and , other than that noted in the HPI,  is negative    Objective:   /58   Pulse 74   Temp 98.4  F (36.9  C)   Resp 17   Ht 1.676 m (5' 6\")   Wt 109.5 kg (241 lb 6.4 oz)   SpO2 99%   BMI 38.96 kg/m    GENERAL APPEARANCE:  Alert, in no distress, oriented, morbidly obese, cooperative  ENT:  Mouth and posterior oropharynx normal, moist mucous membranes, normal hearing acuity  EYES:  EOM, conjunctivae, lids, pupils and irises normal  NECK:  No adenopathy,masses or thyromegaly  RESP:  respiratory effort and palpation of chest normal, lungs clear to auscultation , no respiratory distress  CV:  Palpation and auscultation of heart done , regular rate and rhythm, no murmur, rub, or gallop, peripheral edema 2+ in RLE. Stump present to LLE  ABDOMEN:  normal bowel sounds, soft, nontender, no hepatosplenomegaly or other masses, no guarding or rebound  M/S:   Self transfers. Slide board use or stand pivot. Wheelchair bound  SKIN:  Inspection of skin and subcutaneous tissue baseline, wound healing well, no signs of infection see photo  NEURO:   Cranial nerves 2-12 are normal tested and " grossly at patient's baseline, no purposeful movement in upper and lower extremities  PSYCH:  oriented X 3, normal insight, judgement and memory, affect and mood normal        Most Recent 3 CBC's:  Recent Labs   Lab Test 03/01/24  1309 02/02/24  1043 12/27/23  0958   WBC 7.0 9.2 8.0   HGB 10.3* 10.9* 10.5*   MCV 92 91 90    200 199     Most Recent 3 BMP's:  Recent Labs   Lab Test 02/16/24  1111 02/02/24  1043 01/02/24  1225 01/02/24  1136 01/02/24  0804    138  --   --  136   POTASSIUM 3.8 4.5  --   --  4.1   CHLORIDE 96* 97*  --   --  94*   CO2 31* 33*  --   --  34*   BUN 33.1* 46.2*  --   --  37.1*   CR 1.00* 1.04*  --   --  0.87   ANIONGAP 11 8  --   --  8   ZAKIA 10.4* 10.5*  --   --  11.0*   * 228* 208*   < > 232*    < > = values in this interval not displayed.     Most Recent 2 LFT's:  Recent Labs   Lab Test 11/17/23  1033 11/01/23  0613   AST 20 19   ALT 11 12   ALKPHOS 90 97   BILITOTAL 0.5 0.2     Most Recent 3 INR's:  Recent Labs   Lab Test 03/01/24  1319 02/16/24  1111 01/02/24  0857   INR 1.64* 2.08* 1.47*     Most Recent Hemoglobin A1c:  Recent Labs   Lab Test 03/01/24  1319   A1C 9.9*     Most Recent Anemia Panel:  Recent Labs   Lab Test 03/01/24  1309   WBC 7.0   HGB 10.3*   HCT 32.9*   MCV 92          ASSESSMENT/PLAN:  (E11.621,  L97.509,  Z79.4) Type 2 diabetes mellitus with foot ulcer, with long-term current use of insulin (H)  (E66.01) Obesity, Class III, BMI 40-49.9 (morbid obesity) (H)  Comment: Chronic. Last A1c  today 9.9%. Goal <9%. Discussed history of being on metformin in the past. No longer on this and she does not know why. I suspect it may be due to GUSTAVO history. She reports being followed by endocrine and is wanting to see them again.   Plan:   -Monitor Blood Glucose QID as directed  -Continue Novlog sliding scale high coverage TID and at HS  -Increase tresiba to 40 units at HS  -Advised her to schedule follow up with endocrinology and staff to assist with  scheduling transportation. Pending appt. Follow up with Dr Remy Villareal with [facility] [phone]   -BMP, CBC and INR due today 3/1/24  -Trend hgb A1c in 3 months. Due June 2024  -Trend labs periodically               (I48.0) Paroxysmal atrial fibrillation (H)  (Z79.01) Long term (current) use of anticoagulants  (D68.32,  T45.515A) Warfarin-induced coagulopathy   (I10) Essential hypertension with goal blood pressure less than 140/90  (E78.2) Mixed hyperlipidemia  (N18.30) Stage 3 chronic kidney disease, unspecified whether stage 3a or 3b CKD (H)  (I50.812) Chronic right-sided heart failure (H)  (R63.5) Weight gain  Comment: Chronic. Baseline Creatinine~ 1-1.2. Based on JNC-8 goals,  patients age of 78 year old, presence of diabetes or CKD, and goals of care goal BP is  <140/90 mm Hg. Patient is stable with current plan of care and routine assessment.. INR goal 2-3. Last INR via fingerstick was 2.1, INR today was 1.6 via fingerstick with staff, however machine malfunction so unknown if accurate. Per labs INR today 1.64. Trending weight gain present despite adjustment of torsemide dose. Attempted to transition to furosemide with no relief. Did receive one time dose of metolazone 2.5mg on 2/22/24. Weight decreased down to 239# today. She is now open to resuming metolazone once a week for now until weight stabilizes.   Plan:   -Monitor for worsening s/symptoms of concerns  -Monitor BP and HR as directed  -Continue asa 81mg daily  -Continue metolazone 2.5mg once a week on Thursdays for now.   -Continue atorvastatin 40mg daily  -Change coumadin to 2.5mg daily. Recheck INR in 1 week on 3/8/24  -Monitor weights as directed daily. Baseline weight around 222-224# on admission but noted highest weight noted to be 242#. Weight today 239#  -Continue torsemide 40mg daily. HOLD if SBP<100  -Continue lymphedema therapy to bilateral lower extremities as directed.   -Would benefit from cardiology referral. Declines at this time as  she believes she does not have CHF history.   -BMP, CBC and INR due today 3/1/24  -Trend labs periodically      (I89.0) Lymphedema  (I87.8) Venous stasis  Comment: Acute on chronic. Suspect edema may also be contributing to wound concerns to RLE. Seen by wound provider in clinic on 2/26/24.   Plan:  -Continue Lymphedema therapy to assess and assist with edema control M-F on site.   -Discontinue mupirocin  -Follow up with wound clinic as directed. Scheduled for 2/26/24.   -WOUND CARE TO RLE: Three times a week and as needed. Cleanse RLE wounds with Normal saline. Pat dry with non-sterile gauze. Apply lotion to the intact skin surrounding your wound and other dry skin locations. Some good options include Remedy skin repair Cream, Sarna, Vanicream and Cetaphil. Primary dressing: Apply Aquacel Ag into/onto wounds. Secondary dressing: Cover with silicone foam. Compression per lymphedema.  -Monitor for worsening s/symptoms of concerns   -BMP, CBC and INR due today 3/1/24  -Trend labs periodically      (E11.621,  L97.425) Diabetic ulcer of left heel associated with type 2 diabetes mellitus, with muscle involvement without evidence of necrosis (H)  (Z89.512) 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)  (Z98.890) Status post debridement  (M86.9) Osteomyelitis of left foot, unspecified type (H)  (F11.20) Opioid dependence, uncomplicated (H)  (G89.4) Chronic pain syndrome  (I83.019,  I83.029,  L97.919,  L97.929) Venous stasis ulcers of both lower extremities (H)  (I89.0) Lymphedema  (Z94.5) History of skin graft placement on 1/2/24 as above  Comment: Chronic. S/p debridement 9/26/23. S/p Left guillotine BKA on 10/7/23. S/p tibial/fibular resection and debridement of stump 10/17/23. Followed by vascular. Completed course of meropenem/vancomycin 10/8 for osteomyelitis. Surgery completed 1/2/24 per review.   Plan:   -Monitor pain complaints  -Continue methadone 10mg daily at 1500 and 5mg TID scheduled.    -Tylenol PRN  -Continue orthotic lymph  needs as directed and advised per Soham in preparation for prosthetic.   -Follow up with vascular as directed PRN. Prosthetic follow up as directed  -Lymphedema needs to left stump to assist with prosthetic needs MWF  -Aquaphor to thigh and stump BID  -BMP, CBC and INR due today 3/1/24  -Trend labs periodically      (K59.01) Constipation  Comment: Acute on chronic. Reports harder stools. Declines offer for scheduling agents.   Plan:  -Miralax BID PRN  -Senna BID PRN  -Monitor BM patterns      Electronically Signed by:  Dr. Haylie Rowe DNP, APRN, FNP-C, WCS-C, EDS-C          Sincerely,        Haylie Rowe, MOE CNP

## 2024-03-02 LAB
ANION GAP SERPL CALCULATED.3IONS-SCNC: 8 MMOL/L (ref 7–15)
BUN SERPL-MCNC: 34.8 MG/DL (ref 8–23)
CALCIUM SERPL-MCNC: 10.3 MG/DL (ref 8.8–10.2)
CHLORIDE SERPL-SCNC: 92 MMOL/L (ref 98–107)
CREAT SERPL-MCNC: 1.05 MG/DL (ref 0.51–0.95)
DEPRECATED HCO3 PLAS-SCNC: 37 MMOL/L (ref 22–29)
EGFRCR SERPLBLD CKD-EPI 2021: 54 ML/MIN/1.73M2
GLUCOSE SERPL-MCNC: 368 MG/DL (ref 70–99)
POTASSIUM SERPL-SCNC: 4.1 MMOL/L (ref 3.4–5.3)
SODIUM SERPL-SCNC: 137 MMOL/L (ref 135–145)

## 2024-03-07 NOTE — PROGRESS NOTES
"[facility] GERIATRICS  [place] Medical Record Number:  [id number]  Place of Service where encounter took place: [facility] ([place]) [[id number]]    HPI:    Linda Loredo is a 79 year old  (1945), who is being seen today for an episodic care visit at the above location. Today's concern is INR/Coumadin management for NICO Rolle    ROS/Subjective:  Bleeding Signs/Symptoms:  None  Thromboembolic Signs/Symptoms:  None  Medication Changes:  No  Dietary Changes:  No  Activity Changes: No  Bacterial/Viral Infection:  No  Missed Coumadin Doses:  None  On ASA: No  Other Concerns:  No    OBJECTIVE:  /73   Pulse 80   Temp 97.7  F (36.5  C)   Resp 18   Ht 1.676 m (5' 6\")   Wt 107.6 kg (237 lb 3.2 oz)   SpO2 100%   BMI 38.29 kg/m    Last INR: 1.64 on 3/1/24  INR Today:  1.9  Current Dose:  coumadin 2.5mg daily    ASSESSMENT:     Paroxysmal atrial fibrillation (H)  Warfarin-induced coagulopathy  (H24)  Long term (current) use of anticoagulants  Subtherapeutic INR for goal of 2-3    PLAN/ORDERS:   New Dose: Change coumadin to 3mg on Mon/thurs and 2.5mg AOD    Next INR: 2 weeks    Electronically signed by:  Dr. Haylie Rowe DNP, APRN, FNP-C, WCS-C, EDS-C      "

## 2024-03-08 ENCOUNTER — NURSING HOME VISIT (OUTPATIENT)
Dept: GERIATRICS | Facility: CLINIC | Age: 79
End: 2024-03-08
Payer: COMMERCIAL

## 2024-03-08 VITALS
DIASTOLIC BLOOD PRESSURE: 73 MMHG | HEART RATE: 80 BPM | SYSTOLIC BLOOD PRESSURE: 137 MMHG | BODY MASS INDEX: 38.12 KG/M2 | OXYGEN SATURATION: 100 % | RESPIRATION RATE: 18 BRPM | HEIGHT: 66 IN | TEMPERATURE: 97.7 F | WEIGHT: 237.2 LBS

## 2024-03-08 DIAGNOSIS — T45.515A WARFARIN-INDUCED COAGULOPATHY (H): ICD-10-CM

## 2024-03-08 DIAGNOSIS — I48.0 PAROXYSMAL ATRIAL FIBRILLATION (H): Primary | ICD-10-CM

## 2024-03-08 DIAGNOSIS — Z79.01 LONG TERM (CURRENT) USE OF ANTICOAGULANTS: ICD-10-CM

## 2024-03-08 DIAGNOSIS — D68.32 WARFARIN-INDUCED COAGULOPATHY (H): ICD-10-CM

## 2024-03-08 PROCEDURE — 99309 SBSQ NF CARE MODERATE MDM 30: CPT | Performed by: NURSE PRACTITIONER

## 2024-03-08 NOTE — LETTER
"    3/8/2024        RE: Linda Loredo  [address] [address] [address]        [facility]  [place] Medical Record Number:  [id number]  Place of Service where encounter took place: [facility] ([facility] & ) ([place]) [[id number]]    HPI:    Linda Loredo is a 79 year old  (1945), who is being seen today for an episodic care visit at the above location. Today's concern is INR/Coumadin management for NICO Fib    ROS/Subjective:  Bleeding Signs/Symptoms:  None  Thromboembolic Signs/Symptoms:  None  Medication Changes:  No  Dietary Changes:  No  Activity Changes: No  Bacterial/Viral Infection:  No  Missed Coumadin Doses:  None  On ASA: No  Other Concerns:  No    OBJECTIVE:  /73   Pulse 80   Temp 97.7  F (36.5  C)   Resp 18   Ht 1.676 m (5' 6\")   Wt 107.6 kg (237 lb 3.2 oz)   SpO2 100%   BMI 38.29 kg/m    Last INR: 1.64 on 3/1/24  INR Today:  1.9  Current Dose:  coumadin 2.5mg daily    ASSESSMENT:     Paroxysmal atrial fibrillation (H)  Warfarin-induced coagulopathy  (H24)  Long term (current) use of anticoagulants  Subtherapeutic INR for goal of 2-3    PLAN/ORDERS:   New Dose: Change coumadin to 3mg on Mon/thurs and 2.5mg AOD    Next INR: 2 weeks    Electronically signed by:  Dr. Haylie Rowe DNP, APRN, FNP-C, WCS-C, EDS-C          Sincerely,        Haylie Rowe, MOE CNP      "

## 2024-03-18 ENCOUNTER — OFFICE VISIT (OUTPATIENT)
Dept: VASCULAR SURGERY | Facility: CLINIC | Age: 79
End: 2024-03-18
Attending: FAMILY MEDICINE
Payer: COMMERCIAL

## 2024-03-18 ENCOUNTER — NURSING HOME VISIT (OUTPATIENT)
Dept: GERIATRICS | Facility: CLINIC | Age: 79
End: 2024-03-18
Payer: COMMERCIAL

## 2024-03-18 VITALS — SYSTOLIC BLOOD PRESSURE: 112 MMHG | DIASTOLIC BLOOD PRESSURE: 72 MMHG | HEART RATE: 91 BPM | OXYGEN SATURATION: 96 %

## 2024-03-18 VITALS
HEART RATE: 95 BPM | RESPIRATION RATE: 17 BRPM | DIASTOLIC BLOOD PRESSURE: 69 MMHG | HEIGHT: 66 IN | SYSTOLIC BLOOD PRESSURE: 110 MMHG | WEIGHT: 227.8 LBS | BODY MASS INDEX: 36.61 KG/M2 | TEMPERATURE: 97.9 F | OXYGEN SATURATION: 100 %

## 2024-03-18 DIAGNOSIS — I87.8 VENOUS STASIS: ICD-10-CM

## 2024-03-18 DIAGNOSIS — I50.812 CHRONIC RIGHT-SIDED HEART FAILURE (H): ICD-10-CM

## 2024-03-18 DIAGNOSIS — N18.31 STAGE 3A CHRONIC KIDNEY DISEASE (H): ICD-10-CM

## 2024-03-18 DIAGNOSIS — F11.20 OPIOID DEPENDENCE, UNCOMPLICATED (H): ICD-10-CM

## 2024-03-18 DIAGNOSIS — E11.42 TYPE 2 DIABETES MELLITUS WITH DIABETIC POLYNEUROPATHY, WITH LONG-TERM CURRENT USE OF INSULIN (H): Primary | ICD-10-CM

## 2024-03-18 DIAGNOSIS — E66.01 MORBID OBESITY (H): ICD-10-CM

## 2024-03-18 DIAGNOSIS — Z79.4 TYPE 2 DIABETES MELLITUS WITH DIABETIC POLYNEUROPATHY, WITH LONG-TERM CURRENT USE OF INSULIN (H): Primary | ICD-10-CM

## 2024-03-18 DIAGNOSIS — I48.0 PAROXYSMAL ATRIAL FIBRILLATION (H): ICD-10-CM

## 2024-03-18 DIAGNOSIS — Z89.512 STATUS POST BELOW-KNEE AMPUTATION OF LEFT LOWER EXTREMITY (H): ICD-10-CM

## 2024-03-18 DIAGNOSIS — I73.9 PAD (PERIPHERAL ARTERY DISEASE) (H): ICD-10-CM

## 2024-03-18 DIAGNOSIS — I89.0 LYMPHEDEMA: ICD-10-CM

## 2024-03-18 DIAGNOSIS — L97.912 SKIN ULCER OF RIGHT LOWER LEG WITH FAT LAYER EXPOSED (H): Primary | ICD-10-CM

## 2024-03-18 PROCEDURE — 99214 OFFICE O/P EST MOD 30 MIN: CPT | Mod: 24 | Performed by: FAMILY MEDICINE

## 2024-03-18 PROCEDURE — G0463 HOSPITAL OUTPT CLINIC VISIT: HCPCS | Performed by: FAMILY MEDICINE

## 2024-03-18 PROCEDURE — 99309 SBSQ NF CARE MODERATE MDM 30: CPT | Performed by: INTERNAL MEDICINE

## 2024-03-18 RX ORDER — LOPERAMIDE HYDROCHLORIDE 2 MG/1
2 TABLET ORAL EVERY 6 HOURS PRN
COMMUNITY
End: 2024-03-22

## 2024-03-18 RX ORDER — BENZOCAINE/MENTHOL 6 MG-10 MG
LOZENGE MUCOUS MEMBRANE 2 TIMES DAILY
COMMUNITY
End: 2024-03-22

## 2024-03-18 ASSESSMENT — PAIN SCALES - GENERAL: PAINLEVEL: NO PAIN (0)

## 2024-03-18 NOTE — PROGRESS NOTES
[place] [facility]  PHYSICIAN NOTE    Chief Complaint   Patient presents with    custodial Regulatory       HPI:    Linda Loredo is a 79 year old  (1945), who is being seen today for a federally mandated E/M visit at [facility].     Blood sugars range 100-300 during daytime and higher at -400s range. Her Tresiba was increased on 3/1/24 and plans to re-establish with endocrinology on 4/1/24 Dr. Villareal. Weight nicely down recently to 220s from 230s and her edema has responded well to current therapy, -140/60-80s with HR 60-100s.     Pat is seen on site today after group activity. Was snacking on chips/salsa that was provided. Is atune to fluctuations in glucoses as noted above. Looking forward to reconnecting with Dr. Villareal. Used to be on an insulin pump and wonders about going back on that. She is thankful for facility staff/therapy and sees she has worked together with them as a team for her recovery. Wounds improved and edema improving too. Looking forward to moving to a new [facility] at [facility] on April 2nd; previously prior to her amputation was living independently. Has her daughter and son-in-law in [place] too. No reports of pain or dyspnea today. Had outpatient vascular wound clinic appointment earlier today; notes in Epic reviewed. Pictures also noted in Media tab. Tiny scabs on RLE and L BKA but no open wounds and no further wound care needed. Per their clinic, she can follow up PRN but continue lymph wraps and elevation and eventually transition back to her previous Velcro wraps.    Past Medical History:   Diagnosis Date    Depressive disorder, not elsewhere classified     Herpes zoster without mention of complication     Hypercalcemia 2/24/2007    Mild intermittent asthma     Other urinary incontinence     Type II or unspecified type diabetes mellitus with renal manifestations, uncontrolled(250.42) (H)     Type II or unspecified type  diabetes mellitus without mention of complication, not stated as uncontrolled     Unspecified essential hypertension         CODE STATUS: FULL    ALLERGIES: Prednisone, Morphine, and Morphine [fumaric acid]    MEDICATIONS: Reviewed and updated in Epic according to facility MAR  Current Outpatient Medications   Medication Sig Dispense Refill    acetaminophen (TYLENOL) 325 MG tablet Take 2 tablets (650 mg) by mouth every 6 hours as needed for mild pain or other (and adjunct with moderate or severe pain or per patient request)      Ascorbic Acid (VITAMIN C) 500 MG CHEW Take 500 mg by mouth daily      aspirin 81 MG EC tablet Take 1 tablet (81 mg) by mouth daily      atorvastatin (LIPITOR) 40 MG tablet Take 1 tablet (40 mg) by mouth every evening      hydrocortisone (CORTAID) 1 % external cream Apply topically 2 times daily to rash on graft site - patient can self administer      insulin aspart (NOVOLOG FLEXPEN) 100 UNIT/ML pen If Pre-meal Glucose: 140 -164 give 1 unit, 165 -189 give 2 units, 190 -214 give 3 units, 215 -239 give 4 units, 240 -264 give 5 units, 265 -289 give 6 units, 290 -314 give 7 units, 315 -339 give 8 units, 340+ give 9 units. If Bedtime Glucose: 200 -214 give 1 unit, 215 -264 give 2 units, 265 -314 give 3 units, 315+ give 4 units. 15 mL 11    insulin degludec (TRESIBA) 200 UNIT/ML pen Inject 40 Units Subcutaneous at bedtime Hold for blood glucose less than 100 15 mL 1    loperamide (IMODIUM A-D) 2 MG tablet Take 2 mg by mouth every 6 hours as needed for diarrhea      methadone (DOLOPHINE) 10 MG tablet Take 1 tablet (10 mg) by mouth daily Daily at 1500 30 tablet 0    methadone (DOLOPHINE) 5 MG tablet Take 1 tablet (5 mg) by mouth 3 times daily 90 tablet 0    metolazone (ZAROXOLYN) 2.5 MG tablet Take 1 tablet (2.5 mg) by mouth once a week Give on thursdays 4 tablet 2    miconazole (MICATIN) 2 % external powder Apply topically 2 times daily Apply to buttock and fungal areas BID and BID PRN 85 g 11     "mineral oil-hydrophilic petrolatum (AQUAPHOR) external ointment Apply to left thigh wound BID      multivitamin w/minerals (THERA-VIT-M) tablet Take 1 tablet by mouth daily      ondansetron (ZOFRAN ODT) 4 MG ODT tab Take 1 tablet (4 mg) by mouth every 6 hours as needed for nausea or vomiting      polyethylene glycol (MIRALAX) 17 GM/Dose powder Take 17 g by mouth 2 times daily as needed for constipation      senna-docusate (SENOKOT-S/PERICOLACE) 8.6-50 MG tablet Take 1 tablet by mouth 2 times daily as needed for constipation      torsemide (DEMADEX) 20 MG tablet Take 2 tablets (40 mg) by mouth daily HOLD if SBP<100 60 tablet 11    Warfarin Therapy Reminder Per INR         ROS:  4 point ROS including Respiratory, CV, GI and , other than that noted in the HPI,  is negative    Exam:  /69   Pulse 95   Temp 97.9  F (36.6  C)   Resp 17   Ht 1.676 m (5' 6\")   Wt 103.3 kg (227 lb 12.8 oz)   SpO2 100%   BMI 36.77 kg/m    Alert, pleasant, casually dressed, sitting up in WC that she is able to self propel  Moist oral mucosa  Breathing non-labored on RA  Pictures from this AM's vascular wound clinic appointment reviewed in Epic under \"Media\" tab; currently has RLE in clean lymph wrap and L NANCY also covered  Cheerful disposition, engaged in visit  Seems to be good historian for recent medical events and follow up plans    Lab/Diagnostic Data:    Most Recent 3 CBC's:  Recent Labs   Lab Test 03/01/24  1309 02/02/24  1043 12/27/23  0958   WBC 7.0 9.2 8.0   HGB 10.3* 10.9* 10.5*   MCV 92 91 90    200 199     Most Recent 3 BMP's:  Recent Labs   Lab Test 03/01/24  1319 02/16/24  1111 02/02/24  1043    138 138   POTASSIUM 4.1 3.8 4.5   CHLORIDE 92* 96* 97*   CO2 37* 31* 33*   BUN 34.8* 33.1* 46.2*   CR 1.05* 1.00* 1.04*   ANIONGAP 8 11 8   ZAKIA 10.3* 10.4* 10.5*   * 253* 228*   Estimated Creatinine Clearance: 52.7 mL/min (A) (based on SCr of 1.05 mg/dL (H)).    Most Recent 2 LFT's:  Recent Labs   Lab " Test 11/17/23  1033 11/01/23  0613   AST 20 19   ALT 11 12   ALKPHOS 90 97   BILITOTAL 0.5 0.2       Most Recent TSH and T4:  Recent Labs   Lab Test 11/24/23  1012   TSH 3.46   T4 1.20     Most Recent Hemoglobin A1c:  Recent Labs   Lab Test 03/01/24  1319   A1C 9.9*     May 2022 TTE per CareEverywhere:  Final Conclusion Previous Study: 06/18/2019    1. Normal left ventricular chamber size.  Calculated left ventricular ejection fraction   (modified Pan technique) is 69 %.    2. Mildly increased left ventricular wall thickness.    3. Mild mitral valve regurgitation.    4. Estimated right ventricular systolic pressure is 47.6 mmHg.    5. The EF was normal on the previous echo also.       ASSESSMENT/PLAN:  Type 2 diabetes mellitus with diabetic polyneuropathy, with long-term current use of insulin (H)  Morbid obesity (H)  Offered to slightly increase her Tresiba further today but she declined as is looking forward to re-establishing with her endocrinologist Dr. Villareal 4/1/24  She does reportedly eat a fair amount of snacks and food family brings in  Has sliding scale insulin which I collaborated with on site staff in preparation for upcoming new transition to [facility] early next month and it does seem [facility] would like her to be on a set meal/bed time dosing insulin regimen rather than sliding scale insulin so my colleague will address that later this week and I appreciate the assistance    PAD (peripheral artery disease) (H24)  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)  Continue ASA, Atorvastatin  No open wounds but some remaining dry skin on amputation site as noted in Media tab    Paroxysmal atrial fibrillation (H)  Continues on Coumadin  Has INR 3/22/24    Venous stasis  Lymphedema  Chronic right-sided heart failure (H)  Stage 3a chronic kidney disease (H)  Appreciate care team and wound care clinic  Some on-going lymphedema but improved and weight trend slightly down  Continue current diuretics as  noted above along with elevation and compression  Follow up with wound care clinic PRN  Seems euvolemic today  Has labs planned 3/22/24: BMP , CBC, INR    Opioid dependence, uncomplicated (H)  Continues on Methadone therapy; seems compensated  Alert and engaged with facility activities  No pain reported today    Electronically signed by:  Courtney Ruiz DO

## 2024-03-18 NOTE — LETTER
3/18/2024        RE: Linda Loredo  [id number] [address] [id number]        [place] [facility]  PHYSICIAN NOTE    Chief Complaint   Patient presents with     alf Regulatory       HPI:    Linda Loredo is a 79 year old  (1945), who is being seen today for a federally mandated E/M visit at [facility].     Blood sugars range 100-300 during daytime and higher at -400s range. Her Tresiba was increased on 3/1/24 and plans to re-establish with endocrinology on 4/1/24 Dr. Villareal. Weight nicely down recently to 220s from 230s and her edema has responded well to current therapy, -140/60-80s with HR 60-100s.     Pat is seen on site today after group activity. Was snacking on chips/salsa that was provided. Is atune to fluctuations in glucoses as noted above. Looking forward to reconnecting with Dr. Villareal. Used to be on an insulin pump and wonders about going back on that. She is thankful for facility staff/therapy and sees she has worked together with them as a team for her recovery. Wounds improved and edema improving too. Looking forward to moving to a new [facility] at [facility] on April 2nd; previously prior to her amputation was living independently. Has her daughter and son-in-law in [place] too. No reports of pain or dyspnea today. Had outpatient vascular wound clinic appointment earlier today; notes in Epic reviewed. Pictures also noted in Media tab. Tiny scabs on RLE and L BKA but no open wounds and no further wound care needed. Per their clinic, she can follow up PRN but continue lymph wraps and elevation and eventually transition back to her previous Velcro wraps.    Past Medical History:   Diagnosis Date     Depressive disorder, not elsewhere classified      Herpes zoster without mention of complication      Hypercalcemia 2/24/2007     Mild intermittent asthma      Other urinary incontinence      Type II or unspecified  type diabetes mellitus with renal manifestations, uncontrolled(250.42) (H)      Type II or unspecified type diabetes mellitus without mention of complication, not stated as uncontrolled      Unspecified essential hypertension         CODE STATUS: FULL    ALLERGIES: Prednisone, Morphine, and Morphine [fumaric acid]    MEDICATIONS: Reviewed and updated in Epic according to facility MAR  Current Outpatient Medications   Medication Sig Dispense Refill     acetaminophen (TYLENOL) 325 MG tablet Take 2 tablets (650 mg) by mouth every 6 hours as needed for mild pain or other (and adjunct with moderate or severe pain or per patient request)       Ascorbic Acid (VITAMIN C) 500 MG CHEW Take 500 mg by mouth daily       aspirin 81 MG EC tablet Take 1 tablet (81 mg) by mouth daily       atorvastatin (LIPITOR) 40 MG tablet Take 1 tablet (40 mg) by mouth every evening       hydrocortisone (CORTAID) 1 % external cream Apply topically 2 times daily to rash on graft site - patient can self administer       insulin aspart (NOVOLOG FLEXPEN) 100 UNIT/ML pen If Pre-meal Glucose: 140 -164 give 1 unit, 165 -189 give 2 units, 190 -214 give 3 units, 215 -239 give 4 units, 240 -264 give 5 units, 265 -289 give 6 units, 290 -314 give 7 units, 315 -339 give 8 units, 340+ give 9 units. If Bedtime Glucose: 200 -214 give 1 unit, 215 -264 give 2 units, 265 -314 give 3 units, 315+ give 4 units. 15 mL 11     insulin degludec (TRESIBA) 200 UNIT/ML pen Inject 40 Units Subcutaneous at bedtime Hold for blood glucose less than 100 15 mL 1     loperamide (IMODIUM A-D) 2 MG tablet Take 2 mg by mouth every 6 hours as needed for diarrhea       methadone (DOLOPHINE) 10 MG tablet Take 1 tablet (10 mg) by mouth daily Daily at 1500 30 tablet 0     methadone (DOLOPHINE) 5 MG tablet Take 1 tablet (5 mg) by mouth 3 times daily 90 tablet 0     metolazone (ZAROXOLYN) 2.5 MG tablet Take 1 tablet (2.5 mg) by mouth once a week Give on thursdays 4 tablet 2     miconazole  "(MICATIN) 2 % external powder Apply topically 2 times daily Apply to buttock and fungal areas BID and BID PRN 85 g 11     mineral oil-hydrophilic petrolatum (AQUAPHOR) external ointment Apply to left thigh wound BID       multivitamin w/minerals (THERA-VIT-M) tablet Take 1 tablet by mouth daily       ondansetron (ZOFRAN ODT) 4 MG ODT tab Take 1 tablet (4 mg) by mouth every 6 hours as needed for nausea or vomiting       polyethylene glycol (MIRALAX) 17 GM/Dose powder Take 17 g by mouth 2 times daily as needed for constipation       senna-docusate (SENOKOT-S/PERICOLACE) 8.6-50 MG tablet Take 1 tablet by mouth 2 times daily as needed for constipation       torsemide (DEMADEX) 20 MG tablet Take 2 tablets (40 mg) by mouth daily HOLD if SBP<100 60 tablet 11     Warfarin Therapy Reminder Per INR         ROS:  4 point ROS including Respiratory, CV, GI and , other than that noted in the HPI,  is negative    Exam:  /69   Pulse 95   Temp 97.9  F (36.6  C)   Resp 17   Ht 1.676 m (5' 6\")   Wt 103.3 kg (227 lb 12.8 oz)   SpO2 100%   BMI 36.77 kg/m    Alert, pleasant, casually dressed, sitting up in WC that she is able to self propel  Moist oral mucosa  Breathing non-labored on RA  Pictures from this AM's vascular wound clinic appointment reviewed in Epic under \"Media\" tab; currently has RLE in clean lymph wrap and L NANCY also covered  Cheerful disposition, engaged in visit  Seems to be good historian for recent medical events and follow up plans    Lab/Diagnostic Data:    Most Recent 3 CBC's:  Recent Labs   Lab Test 03/01/24  1309 02/02/24  1043 12/27/23  0958   WBC 7.0 9.2 8.0   HGB 10.3* 10.9* 10.5*   MCV 92 91 90    200 199     Most Recent 3 BMP's:  Recent Labs   Lab Test 03/01/24  1319 02/16/24  1111 02/02/24  1043    138 138   POTASSIUM 4.1 3.8 4.5   CHLORIDE 92* 96* 97*   CO2 37* 31* 33*   BUN 34.8* 33.1* 46.2*   CR 1.05* 1.00* 1.04*   ANIONGAP 8 11 8   ZAKIA 10.3* 10.4* 10.5*   * 253* 228* "   Estimated Creatinine Clearance: 52.7 mL/min (A) (based on SCr of 1.05 mg/dL (H)).    Most Recent 2 LFT's:  Recent Labs   Lab Test 11/17/23  1033 11/01/23  0613   AST 20 19   ALT 11 12   ALKPHOS 90 97   BILITOTAL 0.5 0.2       Most Recent TSH and T4:  Recent Labs   Lab Test 11/24/23  1012   TSH 3.46   T4 1.20     Most Recent Hemoglobin A1c:  Recent Labs   Lab Test 03/01/24  1319   A1C 9.9*     May 2022 TTE per CareEverywhere:  Final Conclusion Previous Study: 06/18/2019    1. Normal left ventricular chamber size.  Calculated left ventricular ejection fraction   (modified Pan technique) is 69 %.    2. Mildly increased left ventricular wall thickness.    3. Mild mitral valve regurgitation.    4. Estimated right ventricular systolic pressure is 47.6 mmHg.    5. The EF was normal on the previous echo also.       ASSESSMENT/PLAN:  Type 2 diabetes mellitus with diabetic polyneuropathy, with long-term current use of insulin (H)  Morbid obesity (H)  Offered to slightly increase her Tresiba further today but she declined as is looking forward to re-establishing with her endocrinologist Dr. Villareal 4/1/24  She does reportedly eat a fair amount of snacks and food family brings in  Has sliding scale insulin which I collaborated with on site staff in preparation for upcoming new transition to [facility] early next month and it does seem [facility] would like her to be on a set meal/bed time dosing insulin regimen rather than sliding scale insulin so my colleague will address that later this week and I appreciate the assistance    PAD (peripheral artery disease) (H24)  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)  Continue ASA, Atorvastatin  No open wounds but some remaining dry skin on amputation site as noted in Media tab    Paroxysmal atrial fibrillation (H)  Continues on Coumadin  Has INR 3/22/24    Venous stasis  Lymphedema  Chronic right-sided heart failure (H)  Stage 3a chronic kidney disease (H)  Appreciate care  team and wound care clinic  Some on-going lymphedema but improved and weight trend slightly down  Continue current diuretics as noted above along with elevation and compression  Follow up with wound care clinic PRN  Seems euvolemic today  Has labs planned 3/22/24: BMP , CBC, INR    Opioid dependence, uncomplicated (H)  Continues on Methadone therapy; seems compensated  Alert and engaged with facility activities  No pain reported today    Electronically signed by:  Courtney Ruiz DO      Sincerely,        Courtney Ruiz DO

## 2024-03-18 NOTE — PROGRESS NOTES
Wound Clinic Note         Visit date: 03/18/2024       Nidiaif Complaint:     Linda Loredo is a 79 year old  female had concerns including right leg wound .  The patient has lower extremity edema and right leg ulcers         HISTORY OF PRESENT ILLNESS:    Linda Loredo reports the wound has been present since early February 2024.  The wound began as a water blister that ruptured.   She reports she has had a number of wounds open and closed on both lower extremities.    Initially after her last clinic visit with me the home health nurses came out and change the bandages 3 times a week using alginate, Mepilex bandage and the lymphedema wrap.  However over the last week or so there is been no drainage from the area and they have just been using the lymphedema wraps.  Therapist was scheduled to come out this morning to replace the lymphedema wraps but they called in sick so she comes into the clinic with no lymphedema wraps on today.  She reports that they are coming out this afternoon to replace the lymphedema wrap.  She does report that she has Velcro compression garments which she has used previously to help control her swelling long-term.        The pateint denies fevers or chills.  They report the pain from the wound has been 0/10 and has remained about the same recently.      The patient reports laying down to elevate their legs above the level of their heart at least twice a day.  The patient reports they sleep in a recliner, but the recliner is in a flat position.    Today the patient reports maintaining a high protein diet, but has not been taking protein supplements lately.        The patient denies a history of smoking or chronic steroid use.  The patient confirms having diabetes and reports the blood sugars are greater than 200 once every few days.         The patient has not had any symptoms of infection relating to the wound recently and is not currently on antibiotics.        Problem List:   Past Medical History:   Diagnosis Date    Depressive disorder, not elsewhere classified     Herpes zoster without mention of complication     Hypercalcemia 2/24/2007    Mild intermittent asthma     Other urinary incontinence     Type II or unspecified type diabetes mellitus with renal manifestations, uncontrolled(250.42) (H)     Type II or unspecified type diabetes mellitus without mention of complication, not stated as uncontrolled     Unspecified essential hypertension              Family Hx: family history includes Cancer in her maternal grandmother and paternal grandmother; Diabetes in her sister; Heart Disease in her father; Hypertension in her mother and sister; Psychotic Disorder in her sister; Respiratory in her sister.       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    Procedure: LEFT GUILLOTINE BELOW KNEE AMPUTATION.;  Surgeon: Lan Otto MD;  Location: [facility] OR    AMPUTATE LEG BELOW KNEE Left 10/14/2023    Procedure: debridement of left below the knee amputation;  Surgeon: Lan Otto MD;  Location:  OR    APPLY WOUND VAC Left 1/2/2024    Procedure: WOUND VAC APPLICATION TO LEFT BELOW KNEE STUMP;  Surgeon: Lan Otto MD;  Location:  OR    CHOLECYSTECTOMY      GRAFT SKIN SPLIT THICKNESS FROM TRUNK TO HEAD Left 1/2/2024    Procedure: APPLICATION OF SPLIT-THICKNESS SKIN GRAFT TO LEFT BELOW KNEE STUMP, GRAFT FROM LEFT THIGH;  Surgeon: Lan Otto MD;  Location:  OR    INCISION AND DRAINAGE FOOT, COMBINED Left 9/26/2023    Procedure: Surgical debridement of all nonviable skin and soft tissue and bone left foot;  Surgeon: Nolberto Thorne DPM;  Location: [facility]    IR LOWER EXTREMITY ANGIOGRAM LEFT  10/3/2023    ligation of fallopian tube      OPEN REDUCTION INTERNAL FIXATION ANKLE  10/13/11    left    pinning of left 2nd toe            Allergies:    Allergies   Allergen  Reactions    Prednisone Nausea and Vomiting    Morphine Nausea and Vomiting    Morphine [Fumaric Acid] Nausea and Vomiting              Medication History:    Current Outpatient Medications   Medication Sig    acetaminophen (TYLENOL) 325 MG tablet Take 2 tablets (650 mg) by mouth every 6 hours as needed for mild pain or other (and adjunct with moderate or severe pain or per patient request)    Ascorbic Acid (VITAMIN C) 500 MG CHEW Take 500 mg by mouth daily    aspirin 81 MG EC tablet Take 1 tablet (81 mg) by mouth daily    atorvastatin (LIPITOR) 40 MG tablet Take 1 tablet (40 mg) by mouth every evening    hydrocortisone (CORTAID) 1 % external cream Apply topically 2 times daily to rash on graft site - patient can self administer    insulin aspart (NOVOLOG FLEXPEN) 100 UNIT/ML pen If Pre-meal Glucose: 140 -164 give 1 unit, 165 -189 give 2 units, 190 -214 give 3 units, 215 -239 give 4 units, 240 -264 give 5 units, 265 -289 give 6 units, 290 -314 give 7 units, 315 -339 give 8 units, 340+ give 9 units. If Bedtime Glucose: 200 -214 give 1 unit, 215 -264 give 2 units, 265 -314 give 3 units, 315+ give 4 units.    insulin degludec (TRESIBA) 200 UNIT/ML pen Inject 40 Units Subcutaneous at bedtime Hold for blood glucose less than 100    loperamide (IMODIUM A-D) 2 MG tablet Take 2 mg by mouth every 6 hours as needed for diarrhea    methadone (DOLOPHINE) 10 MG tablet Take 1 tablet (10 mg) by mouth daily Daily at 1500    methadone (DOLOPHINE) 5 MG tablet Take 1 tablet (5 mg) by mouth 3 times daily    metolazone (ZAROXOLYN) 2.5 MG tablet Take 1 tablet (2.5 mg) by mouth once a week Give on thursdays    miconazole (MICATIN) 2 % external powder Apply topically 2 times daily Apply to buttock and fungal areas BID and BID PRN    mineral oil-hydrophilic petrolatum (AQUAPHOR) external ointment Apply to left thigh wound BID    multivitamin w/minerals (THERA-VIT-M) tablet Take 1 tablet by mouth daily    ondansetron (ZOFRAN ODT) 4 MG ODT  tab Take 1 tablet (4 mg) by mouth every 6 hours as needed for nausea or vomiting    polyethylene glycol (MIRALAX) 17 GM/Dose powder Take 17 g by mouth 2 times daily as needed for constipation    senna-docusate (SENOKOT-S/PERICOLACE) 8.6-50 MG tablet Take 1 tablet by mouth 2 times daily as needed for constipation    torsemide (DEMADEX) 20 MG tablet Take 2 tablets (40 mg) by mouth daily HOLD if SBP<100    Warfarin Therapy Reminder Per INR     No current facility-administered medications for this visit.         Tobacco History:  reports that she has never smoked. She has never used smokeless tobacco.       REVIEW OF SYMPTOMS:   The review of systems was negative except as noted in the HPI.           PHYSICAL EXAMINATION:     /72   Pulse 91   SpO2 96%            GENERAL: The patient overall appears well and is no acute distress.   HEAD: normocephalic   EYES: Sclera and conjunctiva clear   NECK: no obvious masses   LUNGS: breathing is unlabored.   EXTREMITIES: No clubbing, cyanosis or edema   SKIN: No rashes or other abnormalities except as noted under the Wound section below.   NEUROLOGICAL: normal motor and sensory function   EDEMA: Sever       WOUND: Healed      Also see below for wound details:       Circumferential volume measures:             No data to display                Ulceration(s)/Wound(s):   Please see the media tab under the chart review for pictures of the wounds.  Nursing staff removed dressings and cleansed wound.                 Recent Labs   Lab Test 01/02/24  0804 12/08/23  1226 09/23/23  0533   A1C 8.7* 7.6* 7.0*          Recent Labs   Lab Test 11/17/23  1033 11/01/23  0613 09/22/23  1941   ALBUMIN 3.8 3.2* 3.1*         WBC   Date Value Ref Range Status   02/22/2008 9.4 4.0 - 11.0 10e9/L Final     WBC Count   Date Value Ref Range Status   03/01/2024 7.0 4.0 - 11.0 10e3/uL Final     Albumin   Date Value Ref Range Status   11/17/2023 3.8 3.5 - 5.2 g/dL Final   02/22/2008 4.1 3.2 - 4.5 g/dL  Final           No sharp debridement performed today.                  ASSESSMENT:   This is a 79 year old  female with right leg ulcers, the patient also has lower extremity edema which was also managed during today's clinic visit.          PLAN:   No further bandages are required.  I have encouraged her to continue to have the lymphedema wrap applied as long as they can and then she should transition back to using her Velcro compression garment every day to help control her swelling long-term.    Separate from the wound care instructions we then discussed management strategies for lower extremity edema.  I explained the keys for managing lower extremity edema are compression and elevation.  I have encouraged the patient to continue to elevate the legs as they have been doing, including laying down with their legs above the level of the heart for 30 minutes at least twice a day.  I also explained to the patient that sleeping flat is also very important for controlling the edema.  We discussed several things they can try to get their legs elevate better at night.     I have encouraged the patient to continue on their high protein diet to aid in wound healing.   The patient will return to the wound clinic on an as-needed basis if further wounds develop.        30 minutes spent on the date of the encounter doing chart review, history and exam, documentation and further activities per the note, this time excludes any procedure time      Luis E Morley MD  03/18/2024   1:21 PM   [facility]  [phone]    This note was electronically signed by Luis E Morley MD

## 2024-03-21 ENCOUNTER — LAB REQUISITION (OUTPATIENT)
Dept: LAB | Facility: CLINIC | Age: 79
End: 2024-03-21
Payer: COMMERCIAL

## 2024-03-21 DIAGNOSIS — I10 ESSENTIAL (PRIMARY) HYPERTENSION: ICD-10-CM

## 2024-03-21 DIAGNOSIS — N18.30 CHRONIC KIDNEY DISEASE, STAGE 3 UNSPECIFIED (H): ICD-10-CM

## 2024-03-21 DIAGNOSIS — I48.0 PAROXYSMAL ATRIAL FIBRILLATION (H): ICD-10-CM

## 2024-03-21 DIAGNOSIS — D64.9 ANEMIA, UNSPECIFIED: ICD-10-CM

## 2024-03-21 DIAGNOSIS — I48.20 CHRONIC ATRIAL FIBRILLATION, UNSPECIFIED (H): ICD-10-CM

## 2024-03-21 DIAGNOSIS — I50.22 CHRONIC SYSTOLIC (CONGESTIVE) HEART FAILURE (H): ICD-10-CM

## 2024-03-21 NOTE — PROGRESS NOTES
[facility]IATR[facility]    Chief Complaint   Patient presents with    RECHECK     HPI:  Linda Loredo is a 79 year old  (1945), who is being seen today for an episodic care visit at: [facility] ([facility] ) (NF) [[id number]]. Today's concern is: The primary encounter diagnosis was Type 2 diabetes mellitus with diabetic polyneuropathy, with long-term current use of insulin (H). Diagnoses of Morbid obesity (H), PAD (peripheral artery disease) (H24), 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), Paroxysmal atrial fibrillation (H), Venous stasis, Lymphedema, Chronic right-sided heart failure (H), Stage 3a chronic kidney disease (H), Opioid dependence, uncomplicated (H), Warfarin-induced coagulopathy  (H24), Long term (current) use of anticoagulants, Weight gain, Essential hypertension, Other chronic osteomyelitis of left foot (H), Open wound, Diabetic ulcer of left heel associated with type 2 diabetes mellitus, with necrosis of bone (H), Acute blood loss anemia, Dyslipidemia, Persistent atrial fibrillation (H), Severe obesity (BMI 35.0-39.9) with comorbidity (H), Slow transit constipation, Hx of skin graft, Severe obesity (H), Status post debridement, Skin lesion, Drug-induced constipation, Primary hyperparathyroidism (H24), Physical deconditioning, Chronic obstructive pulmonary disease, unspecified COPD type (H), and Major depressive disorder, recurrent episode, moderate (H) were also pertinent to this visit.    Met with patient who denies any chest pain, palpitations, shortness of breath, RASHID, lightheadedness, dizziness, or cough. Denies any abdominal discomfort. Denies N&V. Denies B&B concerns. Denies dysuria or frequency. Denies loose or constipation. Appetite good. Sleeping well. Blood Glucose values having been improving since insulin adjustment. Skin to RLE improvement and resolved. Patient is eager to be able to discharge soon to new [facility] setting in  "April.     BP Readings from Last 3 Encounters:   03/22/24 123/73   03/18/24 112/72   03/18/24 110/69     Wt Readings from Last 5 Encounters:   03/22/24 104.1 kg (229 lb 9.6 oz)   03/18/24 103.3 kg (227 lb 12.8 oz)   03/08/24 107.6 kg (237 lb 3.2 oz)   03/01/24 109.5 kg (241 lb 6.4 oz)   02/23/24 108.3 kg (238 lb 12.8 oz)     Allergies, and PMH/PSH reviewed in EPIC today.  REVIEW OF SYSTEMS:  4 point ROS including Respiratory, CV, GI and , other than that noted in the HPI,  is negative    Objective:   /73   Pulse 91   Temp 98.8  F (37.1  C)   Resp 17   Ht 1.676 m (5' 6\")   Wt 104.1 kg (229 lb 9.6 oz)   SpO2 100%   BMI 37.06 kg/m    GENERAL APPEARANCE:  Alert, in no distress, oriented, cooperative  ENT:  Mouth and posterior oropharynx normal, moist mucous membranes, normal hearing acuity  EYES:  EOM, conjunctivae, lids, pupils and irises normal  NECK:  No adenopathy,masses or thyromegaly  RESP:  respiratory effort and palpation of chest normal, lungs clear to auscultation , no respiratory distress  CV:  Palpation and auscultation of heart done , regular rate and rhythm, no murmur, rub, or gallop, no edema  ABDOMEN:  normal bowel sounds, soft, nontender, no hepatosplenomegaly or other masses, no guarding or rebound  M/S:   Wheelchair for main mobility. Able to independent transfer self and provide own ADL needs  SKIN:  Inspection of skin and subcutaneous tissue baseline, Palpation of skin and subcutaneous tissue baseline, see photos  NEURO:   Cranial nerves 2-12 are normal tested and grossly at patient's baseline, no purposeful movement in upper and lower extremities  PSYCH:  oriented X 3, normal insight, judgement and memory, affect and mood normal              Most Recent 3 CBC's:  Recent Labs   Lab Test 03/01/24  1309 02/02/24  1043 12/27/23  0958   WBC 7.0 9.2 8.0   HGB 10.3* 10.9* 10.5*   MCV 92 91 90    200 199     Most Recent 3 BMP's:  Recent Labs   Lab Test 03/01/24  1319 02/16/24  1111 " 02/02/24  1043    138 138   POTASSIUM 4.1 3.8 4.5   CHLORIDE 92* 96* 97*   CO2 37* 31* 33*   BUN 34.8* 33.1* 46.2*   CR 1.05* 1.00* 1.04*   ANIONGAP 8 11 8   ZAKIA 10.3* 10.4* 10.5*   * 253* 228*     Most Recent 2 LFT's:  Recent Labs   Lab Test 11/17/23  1033 11/01/23  0613   AST 20 19   ALT 11 12   ALKPHOS 90 97   BILITOTAL 0.5 0.2     Most Recent 3 INR's:  Recent Labs   Lab Test 03/01/24  1319 02/16/24  1111 01/02/24  0857   INR 1.64* 2.08* 1.47*     Most Recent TSH and T4:  Recent Labs   Lab Test 11/24/23  1012   TSH 3.46   T4 1.20     Most Recent Hemoglobin A1c:  Recent Labs   Lab Test 03/01/24  1319   A1C 9.9*     Most Recent Anemia Panel:  Recent Labs   Lab Test 03/01/24  1309   WBC 7.0   HGB 10.3*   HCT 32.9*   MCV 92          ASSESSMENT/PLAN:  (E11.621,  L97.509,  Z79.4) Type 2 diabetes mellitus with foot ulcer, with long-term current use of insulin (H)  (E66.01) Obesity, Class III, BMI 40-49.9 (morbid obesity) (H)  Comment: Chronic. Last A1c  today 9.9%. Goal <9%. Discussed history of being on metformin in the past. No longer on this and she does not know why. I suspect it may be due to GUSTAVO history. She reports being followed by endocrine and is wanting to see them again. Per last endo note from 6 months ago, patient was not on any mealtime insulin with recommendations to start ozempic once a week with continuing tresiba. Patient also reports wanting to restart her insulin pump as she reports having one <1 year ago as this was stopped by her previous PCP   Plan:   -Monitor Blood Glucose QID as directed  -Discontinued sliding scale coverage due to transitioning to longterm setting within the next couple weeks. Started insulin aspart 3units TID with meals. HOLD if Blood Glucose<120.   -Continue tresiba 45 units at HS  -Advised her to schedule follow up with endocrinology and staff to assist with scheduling transportation. Pending appt. Follow up with Dr Remy Villareal with [facility] [phone]    -BMP, CBC and INR due today 3/22/24  -Trend labs routinely and PRN  -Trend hgb A1c in 3 months. Due June 2024            (I48.0) Paroxysmal atrial fibrillation (H)  (Z79.01) Long term (current) use of anticoagulants  (D68.32,  T45.515A) Warfarin-induced coagulopathy   (I10) Essential hypertension with goal blood pressure less than 140/90  (E78.2) Mixed hyperlipidemia  (N18.30) Stage 3 chronic kidney disease, unspecified whether stage 3a or 3b CKD (H)  (I50.812) Chronic right-sided heart failure (H)  (R63.5) Weight gain  Comment: Chronic. Baseline Creatinine~ 1-1.2. Based on JNC-8 goals,  patients age of 78 year old, presence of diabetes or CKD, and goals of care goal BP is  <140/90 mm Hg. Patient is stable with current plan of care and routine assessment.. INR goal 2-3. Last INR via fingerstick was 1.9 on 3/8, INR today- 2.3 via fingerstick today. Trending weight gain present despite adjustment of torsemide dose. Attempted to transition to furosemide with no relief. Did receive one time dose of metolazone 2.5mg on 2/22/24. Weight decreased down to 239# today. She is now open to resuming metolazone once a week for now until weight stabilizes.   Plan:   -Monitor for worsening s/symptoms of concerns  -Monitor BP and HR as directed  -Continue asa 81mg daily  -Continue metolazone 2.5mg once a week on Thursdays for now.   -Continue atorvastatin 40mg daily  -Continue coumadin 3mg on Mon/Thurs and 2.5mg AOD.   -Monitor weights as directed daily. Baseline weight around 222-224# on admission but noted highest weight noted to be 242#. Weight today 229#  -Continue torsemide 40mg daily. HOLD if SBP<100  -Continue lymphedema therapy to bilateral lower extremities as directed.   -Would benefit from cardiology referral. Declines at this time as she believes she does not have CHF history.   -BMP, CBC and INR due today 3/22/24  -Trend labs routinely and PRN  -INR due Monday 4/1/24 for discharge planning needs         (I89.0)  Lymphedema  (I87.8) Venous stasis  Comment: Acute on chronic. Suspect edema may also be contributing to wound concerns to RLE. Seen by wound provider in clinic on 2/26/24. Areas RESOLVED.   Plan:  -Continue Lymphedema therapy to assess and assist with edema control MWF on site.   -Follow up with wound clinic as directed PRN  -Monitor RLE for worsening s/symptoms of concerns. No dressings needed at this time.   -BMP, CBC and INR due today 3/22/24  -Trend labs routinely and PRN     (E11.621,  L97.425) Diabetic ulcer of left heel associated with type 2 diabetes mellitus, with muscle involvement without evidence of necrosis (H)  (Z89.512) 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)  (Z98.890) Status post debridement  (M86.9) Osteomyelitis of left foot, unspecified type (H)  (F11.20) Opioid dependence, uncomplicated (H)  (G89.4) Chronic pain syndrome  (I83.019,  I83.029,  L97.919,  L97.929) Venous stasis ulcers of both lower extremities (H)  (I89.0) Lymphedema  (Z94.5) History of skin graft placement on 1/2/24 as above  Comment: Chronic. S/p debridement 9/26/23. S/p Left guillotine BKA on 10/7/23. S/p tibial/fibular resection and debridement of stump 10/17/23. Followed by vascular. Completed course of meropenem/vancomycin 10/8 for osteomyelitis. Surgery completed 1/2/24 per review.   Plan:   -Monitor pain complaints  -Continue methadone 10mg daily at 1500 and 5mg TID scheduled.   -Tylenol PRN  -Continue orthotic lymph  needs as directed and advised per Soham in preparation for prosthetic.   -Follow up with vascular as directed PRN. Prosthetic follow up as directed  -Aquaphor to thigh and stump BID  -BMP, CBC and INR due today 3/22/24     (K59.01) Constipation  Comment: Acute on chronic. Reports harder stools. Declines offer for scheduling agents.   Plan:  -Miralax BID PRN  -Senna BID PRN  -Monitor BM patterns      Electronically Signed by:  Dr. Haylie Rowe DNP, APRN, FNP-C, WCS-C,  "EDS-C      Face to Face and Medical Necessity Statement for DME Provider visit    Demographic Information on Linda Loredo:  Gender: female  : 1945  [address] [facility] [address]  [phone] (home)     Medical Record: [id number]  Social Security Number: [id number]  Primary Care Provider: Haylie Rowe  Insurance: Payor: [facility] / Plan: [facility] MEDICARE / Product Type: HMO /     HPI:   Linda Loredo is a 79 year old  (1945), who is being seen today for a face to face provider visit at [facility]; medical necessity statement for DME included. This patient requires the following:  DME Ordered and Medical Necessity Statement     Recliner. Patient will be independent with use of recliner in [facility] apartment setting. Able to use independently and will assist/promote continued independence.     2. Shower chair with back. Patient will be independent with use of shower chair device. Able to use within her [facility] apartment without concerns    3. Hospital Bed: variable height, semi-electric  Mattress Type: Standard  Rail Type: half    Height:   Ht Readings from Last 2 Encounters:   24 1.676 m (5' 6\")   24 1.676 m (5' 6\")     Weight:  Wt Readings from Last 2 Encounters:   24 104.1 kg (229 lb 9.6 oz)   24 103.3 kg (227 lb 12.8 oz)     The patient does require positioning of the body in ways not feasible with an ordinary bed due to a medical condition that is expected to last at least 1 month due to CHF, alleviation of chronic pain, along with left below knee amputation. The patient does require the head of bed elevated more than 30* most of the time due to CHF. The patient does not require traction that can only be attached to a hospital bed. The patient does  require a bed height different than a fixed height hospital bed to permit tranfers to wheelchair or standing position. The patient does  require frequent or immediate " changes in body position due to CHF, alleviation of chronic pain, along with left below knee amputation.     Pt needing above DME with expected length of need of 99 year  due to medical necessity associated with following diagnosis:     Type 2 diabetes mellitus with diabetic polyneuropathy, with long-term current use of insulin (H)  Morbid obesity (H)  PAD (peripheral artery disease) (H24)  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)  Paroxysmal atrial fibrillation (H)  Venous stasis  Lymphedema  Chronic right-sided heart failure (H)  Stage 3a chronic kidney disease (H)  Opioid dependence, uncomplicated (H)  Warfarin-induced coagulopathy (H24)  Long term (current) use of anticoagulants  Weight gain  Essential hypertension  Other chronic osteomyelitis of left foot (H)  Open wound  Diabetic ulcer of left heel associated with type 2 diabetes mellitus, with necrosis of bone (H)  Acute blood loss anemia  Dyslipidemia  Persistent atrial fibrillation (H)  Severe obesity (BMI 35.0-39.9) with comorbidity (H)  Slow transit constipation  Hx of skin graft  Severe obesity (H)  Status post debridement  Skin lesion  Drug-induced constipation  Primary hyperparathyroidism (H24)  Physical deconditioning  Chronic obstructive pulmonary disease, unspecified COPD type (H)  Major depressive disorder, recurrent episode, moderate (H)      PMH   has a past medical history of Depressive disorder, not elsewhere classified, Herpes zoster without mention of complication, Hypercalcemia (2/24/2007), Mild intermittent asthma, Other urinary incontinence, Type II or unspecified type diabetes mellitus with renal manifestations, uncontrolled(250.42) (H), Type II or unspecified type diabetes mellitus without mention of complication, not stated as uncontrolled, and Unspecified essential hypertension.    ROS:4 point ROS including Respiratory, CV, GI and , other than that noted in the HPI,  is negative    EXAM  Vitals: /73    "Pulse 91   Temp 98.8  F (37.1  C)   Resp 17   Ht 1.676 m (5' 6\")   Wt 104.1 kg (229 lb 9.6 oz)   SpO2 100%   BMI 37.06 kg/m  ;BMI= Body mass index is 37.06 kg/m .  GENERAL APPEARANCE:  Alert, in no distress, oriented, cooperative  ENT:  Mouth and posterior oropharynx normal, moist mucous membranes, normal hearing acuity  EYES:  EOM, conjunctivae, lids, pupils and irises normal  NECK:  No adenopathy,masses or thyromegaly  RESP:  respiratory effort and palpation of chest normal, lungs clear to auscultation , no respiratory distress  CV:  Palpation and auscultation of heart done , regular rate and rhythm, no murmur, rub, or gallop, no edema  ABDOMEN:  normal bowel sounds, soft, nontender, no hepatosplenomegaly or other masses, no guarding or rebound  M/S:   Wheelchair for main mobility. Able to independent transfer self and provide own ADL needs  SKIN:  Inspection of skin and subcutaneous tissue baseline, Palpation of skin and subcutaneous tissue baseline, see photos  NEURO:   Cranial nerves 2-12 are normal tested and grossly at patient's baseline, no purposeful movement in upper and lower extremities  PSYCH:  oriented X 3, normal insight, judgement and memory, affect and mood normal     ASSESSMENT/PLAN:  1. Type 2 diabetes mellitus with diabetic polyneuropathy, with long-term current use of insulin (H)    2. Morbid obesity (H)    3. PAD (peripheral artery disease) (H24)    4. 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)    5. Paroxysmal atrial fibrillation (H)    6. Venous stasis    7. Lymphedema    8. Chronic right-sided heart failure (H)    9. Stage 3a chronic kidney disease (H)    10. Opioid dependence, uncomplicated (H)    11. Warfarin-induced coagulopathy  (H24)    12. Long term (current) use of anticoagulants    13. Weight gain    14. Essential hypertension    15. Other chronic osteomyelitis of left foot (H)    16. Open wound    17. Diabetic ulcer of left heel associated with type 2 " diabetes mellitus, with necrosis of bone (H)    18. Acute blood loss anemia    19. Dyslipidemia    20. Persistent atrial fibrillation (H)    21. Severe obesity (BMI 35.0-39.9) with comorbidity (H)    22. Slow transit constipation    23. Hx of skin graft    24. Severe obesity (H)    25. Status post debridement    26. Skin lesion    27. Drug-induced constipation    28. Primary hyperparathyroidism (H24)    29. Physical deconditioning    30. Chronic obstructive pulmonary disease, unspecified COPD type (H)    31. Major depressive disorder, recurrent episode, moderate (H)        Orders:  1. Facility staff/TC to contact DME company to get their order form for provider to fill out    ELECTRONICALLY SIGNED BY [employer] CERTIFIED PROVIDER:  MOE Fitch CNP   NPI: [id number]  [facility]  [address] [place] [address], [address]  [place] [address]

## 2024-03-22 ENCOUNTER — NURSING HOME VISIT (OUTPATIENT)
Dept: GERIATRICS | Facility: CLINIC | Age: 79
End: 2024-03-22
Payer: COMMERCIAL

## 2024-03-22 VITALS
HEART RATE: 91 BPM | HEIGHT: 66 IN | OXYGEN SATURATION: 100 % | RESPIRATION RATE: 17 BRPM | TEMPERATURE: 98.8 F | SYSTOLIC BLOOD PRESSURE: 123 MMHG | WEIGHT: 229.6 LBS | BODY MASS INDEX: 36.9 KG/M2 | DIASTOLIC BLOOD PRESSURE: 73 MMHG

## 2024-03-22 DIAGNOSIS — E11.42 TYPE 2 DIABETES MELLITUS WITH DIABETIC POLYNEUROPATHY, WITH LONG-TERM CURRENT USE OF INSULIN (H): Primary | ICD-10-CM

## 2024-03-22 DIAGNOSIS — Z89.512 STATUS POST BELOW-KNEE AMPUTATION OF LEFT LOWER EXTREMITY (H): ICD-10-CM

## 2024-03-22 DIAGNOSIS — D62 ACUTE BLOOD LOSS ANEMIA: ICD-10-CM

## 2024-03-22 DIAGNOSIS — J44.9 CHRONIC OBSTRUCTIVE PULMONARY DISEASE, UNSPECIFIED COPD TYPE (H): ICD-10-CM

## 2024-03-22 DIAGNOSIS — N18.31 STAGE 3A CHRONIC KIDNEY DISEASE (H): ICD-10-CM

## 2024-03-22 DIAGNOSIS — T14.8XXA OPEN WOUND: ICD-10-CM

## 2024-03-22 DIAGNOSIS — D68.32 WARFARIN-INDUCED COAGULOPATHY (H): ICD-10-CM

## 2024-03-22 DIAGNOSIS — E21.0 PRIMARY HYPERPARATHYROIDISM (H): ICD-10-CM

## 2024-03-22 DIAGNOSIS — Z94.5 HX OF SKIN GRAFT: ICD-10-CM

## 2024-03-22 DIAGNOSIS — K59.01 SLOW TRANSIT CONSTIPATION: ICD-10-CM

## 2024-03-22 DIAGNOSIS — L98.9 SKIN LESION: ICD-10-CM

## 2024-03-22 DIAGNOSIS — I48.0 PAROXYSMAL ATRIAL FIBRILLATION (H): ICD-10-CM

## 2024-03-22 DIAGNOSIS — Z79.01 LONG TERM (CURRENT) USE OF ANTICOAGULANTS: ICD-10-CM

## 2024-03-22 DIAGNOSIS — I50.812 CHRONIC RIGHT-SIDED HEART FAILURE (H): ICD-10-CM

## 2024-03-22 DIAGNOSIS — L97.424 DIABETIC ULCER OF LEFT HEEL ASSOCIATED WITH TYPE 2 DIABETES MELLITUS, WITH NECROSIS OF BONE (H): ICD-10-CM

## 2024-03-22 DIAGNOSIS — K59.03 DRUG-INDUCED CONSTIPATION: ICD-10-CM

## 2024-03-22 DIAGNOSIS — I73.9 PAD (PERIPHERAL ARTERY DISEASE) (H): ICD-10-CM

## 2024-03-22 DIAGNOSIS — F33.1 MAJOR DEPRESSIVE DISORDER, RECURRENT EPISODE, MODERATE (H): ICD-10-CM

## 2024-03-22 DIAGNOSIS — Z79.4 TYPE 2 DIABETES MELLITUS WITH DIABETIC POLYNEUROPATHY, WITH LONG-TERM CURRENT USE OF INSULIN (H): Primary | ICD-10-CM

## 2024-03-22 DIAGNOSIS — I89.0 LYMPHEDEMA: ICD-10-CM

## 2024-03-22 DIAGNOSIS — Z98.890 STATUS POST DEBRIDEMENT: ICD-10-CM

## 2024-03-22 DIAGNOSIS — I10 ESSENTIAL HYPERTENSION: ICD-10-CM

## 2024-03-22 DIAGNOSIS — R63.5 WEIGHT GAIN: ICD-10-CM

## 2024-03-22 DIAGNOSIS — E66.01 SEVERE OBESITY (H): ICD-10-CM

## 2024-03-22 DIAGNOSIS — I48.19 PERSISTENT ATRIAL FIBRILLATION (H): ICD-10-CM

## 2024-03-22 DIAGNOSIS — F11.20 OPIOID DEPENDENCE, UNCOMPLICATED (H): ICD-10-CM

## 2024-03-22 DIAGNOSIS — E78.5 DYSLIPIDEMIA: ICD-10-CM

## 2024-03-22 DIAGNOSIS — E11.621 DIABETIC ULCER OF LEFT HEEL ASSOCIATED WITH TYPE 2 DIABETES MELLITUS, WITH NECROSIS OF BONE (H): ICD-10-CM

## 2024-03-22 DIAGNOSIS — I87.8 VENOUS STASIS: ICD-10-CM

## 2024-03-22 DIAGNOSIS — T45.515A WARFARIN-INDUCED COAGULOPATHY (H): ICD-10-CM

## 2024-03-22 DIAGNOSIS — E66.01 MORBID OBESITY (H): ICD-10-CM

## 2024-03-22 DIAGNOSIS — E66.01 SEVERE OBESITY (BMI 35.0-39.9) WITH COMORBIDITY (H): ICD-10-CM

## 2024-03-22 DIAGNOSIS — M86.672 OTHER CHRONIC OSTEOMYELITIS OF LEFT FOOT (H): ICD-10-CM

## 2024-03-22 DIAGNOSIS — R53.81 PHYSICAL DECONDITIONING: ICD-10-CM

## 2024-03-22 LAB
BASOPHILS # BLD AUTO: 0.1 10E3/UL (ref 0–0.2)
BASOPHILS NFR BLD AUTO: 1 %
EOSINOPHIL # BLD AUTO: 0.3 10E3/UL (ref 0–0.7)
EOSINOPHIL NFR BLD AUTO: 4 %
ERYTHROCYTE [DISTWIDTH] IN BLOOD BY AUTOMATED COUNT: 14.6 % (ref 10–15)
HCT VFR BLD AUTO: 35.8 % (ref 35–47)
HGB BLD-MCNC: 11.5 G/DL (ref 11.7–15.7)
IMM GRANULOCYTES # BLD: 0 10E3/UL
IMM GRANULOCYTES NFR BLD: 0 %
LYMPHOCYTES # BLD AUTO: 1.7 10E3/UL (ref 0.8–5.3)
LYMPHOCYTES NFR BLD AUTO: 20 %
MCH RBC QN AUTO: 29.5 PG (ref 26.5–33)
MCHC RBC AUTO-ENTMCNC: 32.1 G/DL (ref 31.5–36.5)
MCV RBC AUTO: 92 FL (ref 78–100)
MONOCYTES # BLD AUTO: 0.6 10E3/UL (ref 0–1.3)
MONOCYTES NFR BLD AUTO: 7 %
NEUTROPHILS # BLD AUTO: 5.8 10E3/UL (ref 1.6–8.3)
NEUTROPHILS NFR BLD AUTO: 68 %
NRBC # BLD AUTO: 0 10E3/UL
NRBC BLD AUTO-RTO: 0 /100
PLATELET # BLD AUTO: 152 10E3/UL (ref 150–450)
RBC # BLD AUTO: 3.9 10E6/UL (ref 3.8–5.2)
WBC # BLD AUTO: 8.4 10E3/UL (ref 4–11)

## 2024-03-22 PROCEDURE — 36415 COLL VENOUS BLD VENIPUNCTURE: CPT | Mod: ORL | Performed by: NURSE PRACTITIONER

## 2024-03-22 PROCEDURE — 99309 SBSQ NF CARE MODERATE MDM 30: CPT | Performed by: NURSE PRACTITIONER

## 2024-03-22 PROCEDURE — 80048 BASIC METABOLIC PNL TOTAL CA: CPT | Mod: ORL | Performed by: NURSE PRACTITIONER

## 2024-03-22 PROCEDURE — 85025 COMPLETE CBC W/AUTO DIFF WBC: CPT | Mod: ORL | Performed by: NURSE PRACTITIONER

## 2024-03-22 RX ORDER — INSULIN ASPART 100 [IU]/ML
3 INJECTION, SOLUTION INTRAVENOUS; SUBCUTANEOUS
Status: SHIPPED
Start: 2024-03-22 | End: 2024-06-06

## 2024-03-22 NOTE — LETTER
3/22/2024        RE: Linda Loredo  [address] [facility] [address]        M [facility] GERIATRICS    Chief Complaint   Patient presents with     RECHECK     HPI:  Linda Loredo is a 79 year old  (1945), who is being seen today for an episodic care visit at: [facility] (NF) [[id number]]. Today's concern is: The primary encounter diagnosis was Type 2 diabetes mellitus with diabetic polyneuropathy, with long-term current use of insulin (H). Diagnoses of Morbid obesity (H), PAD (peripheral artery disease) (H24), 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), Paroxysmal atrial fibrillation (H), Venous stasis, Lymphedema, Chronic right-sided heart failure (H), Stage 3a chronic kidney disease (H), Opioid dependence, uncomplicated (H), Warfarin-induced coagulopathy  (H24), Long term (current) use of anticoagulants, Weight gain, Essential hypertension, Other chronic osteomyelitis of left foot (H), Open wound, Diabetic ulcer of left heel associated with type 2 diabetes mellitus, with necrosis of bone (H), Acute blood loss anemia, Dyslipidemia, Persistent atrial fibrillation (H), Severe obesity (BMI 35.0-39.9) with comorbidity (H), Slow transit constipation, Hx of skin graft, Severe obesity (H), Status post debridement, Skin lesion, Drug-induced constipation, Primary hyperparathyroidism (H24), Physical deconditioning, Chronic obstructive pulmonary disease, unspecified COPD type (H), and Major depressive disorder, recurrent episode, moderate (H) were also pertinent to this visit.    Met with patient who denies any chest pain, palpitations, shortness of breath, RASHID, lightheadedness, dizziness, or cough. Denies any abdominal discomfort. Denies N&V. Denies B&B concerns. Denies dysuria or frequency. Denies loose or constipation. Appetite good. Sleeping well. Blood Glucose values having been improving since insulin adjustment. Skin  "to RLE improvement and resolved. Patient is eager to be able to discharge soon to new [facility] setting in April.     BP Readings from Last 3 Encounters:   03/22/24 123/73   03/18/24 112/72   03/18/24 110/69     Wt Readings from Last 5 Encounters:   03/22/24 104.1 kg (229 lb 9.6 oz)   03/18/24 103.3 kg (227 lb 12.8 oz)   03/08/24 107.6 kg (237 lb 3.2 oz)   03/01/24 109.5 kg (241 lb 6.4 oz)   02/23/24 108.3 kg (238 lb 12.8 oz)     Allergies, and PMH/PSH reviewed in EPIC today.  REVIEW OF SYSTEMS:  4 point ROS including Respiratory, CV, GI and , other than that noted in the HPI,  is negative    Objective:   /73   Pulse 91   Temp 98.8  F (37.1  C)   Resp 17   Ht 1.676 m (5' 6\")   Wt 104.1 kg (229 lb 9.6 oz)   SpO2 100%   BMI 37.06 kg/m    GENERAL APPEARANCE:  Alert, in no distress, oriented, cooperative  ENT:  Mouth and posterior oropharynx normal, moist mucous membranes, normal hearing acuity  EYES:  EOM, conjunctivae, lids, pupils and irises normal  NECK:  No adenopathy,masses or thyromegaly  RESP:  respiratory effort and palpation of chest normal, lungs clear to auscultation , no respiratory distress  CV:  Palpation and auscultation of heart done , regular rate and rhythm, no murmur, rub, or gallop, no edema  ABDOMEN:  normal bowel sounds, soft, nontender, no hepatosplenomegaly or other masses, no guarding or rebound  M/S:   Wheelchair for main mobility. Able to independent transfer self and provide own ADL needs  SKIN:  Inspection of skin and subcutaneous tissue baseline, Palpation of skin and subcutaneous tissue baseline, see photos  NEURO:   Cranial nerves 2-12 are normal tested and grossly at patient's baseline, no purposeful movement in upper and lower extremities  PSYCH:  oriented X 3, normal insight, judgement and memory, affect and mood normal              Most Recent 3 CBC's:  Recent Labs   Lab Test 03/01/24  1309 02/02/24  1043 12/27/23  0958   WBC 7.0 9.2 8.0   HGB 10.3* 10.9* 10.5*   MCV 92 91 " 90    200 199     Most Recent 3 BMP's:  Recent Labs   Lab Test 03/01/24  1319 02/16/24  1111 02/02/24  1043    138 138   POTASSIUM 4.1 3.8 4.5   CHLORIDE 92* 96* 97*   CO2 37* 31* 33*   BUN 34.8* 33.1* 46.2*   CR 1.05* 1.00* 1.04*   ANIONGAP 8 11 8   ZAKIA 10.3* 10.4* 10.5*   * 253* 228*     Most Recent 2 LFT's:  Recent Labs   Lab Test 11/17/23  1033 11/01/23  0613   AST 20 19   ALT 11 12   ALKPHOS 90 97   BILITOTAL 0.5 0.2     Most Recent 3 INR's:  Recent Labs   Lab Test 03/01/24  1319 02/16/24  1111 01/02/24  0857   INR 1.64* 2.08* 1.47*     Most Recent TSH and T4:  Recent Labs   Lab Test 11/24/23  1012   TSH 3.46   T4 1.20     Most Recent Hemoglobin A1c:  Recent Labs   Lab Test 03/01/24  1319   A1C 9.9*     Most Recent Anemia Panel:  Recent Labs   Lab Test 03/01/24  1309   WBC 7.0   HGB 10.3*   HCT 32.9*   MCV 92          ASSESSMENT/PLAN:  (E11.621,  L97.509,  Z79.4) Type 2 diabetes mellitus with foot ulcer, with long-term current use of insulin (H)  (E66.01) Obesity, Class III, BMI 40-49.9 (morbid obesity) (H)  Comment: Chronic. Last A1c  today 9.9%. Goal <9%. Discussed history of being on metformin in the past. No longer on this and she does not know why. I suspect it may be due to GUSTAVO history. She reports being followed by endocrine and is wanting to see them again. Per last endo note from 6 months ago, patient was not on any mealtime insulin with recommendations to start ozempic once a week with continuing tresiba. Patient also reports wanting to restart her insulin pump as she reports having one <1 year ago as this was stopped by her previous PCP   Plan:   -Monitor Blood Glucose QID as directed  -Discontinued sliding scale coverage due to transitioning to [facility] setting within the next couple weeks. Started insulin aspart 3units TID with meals. HOLD if Blood Glucose<120.   -Continue tresiba 45 units at HS  -Advised her to schedule follow up with endocrinology and staff to assist with  scheduling transportation. Pending appt. Follow up with Dr Remy Villareal with [facility] [phone]   -BMP, CBC and INR due today 3/22/24  -Trend labs routinely and PRN  -Trend hgb A1c in 3 months. Due June 2024            (I48.0) Paroxysmal atrial fibrillation (H)  (Z79.01) Long term (current) use of anticoagulants  (D68.32,  T45.515A) Warfarin-induced coagulopathy   (I10) Essential hypertension with goal blood pressure less than 140/90  (E78.2) Mixed hyperlipidemia  (N18.30) Stage 3 chronic kidney disease, unspecified whether stage 3a or 3b CKD (H)  (I50.812) Chronic right-sided heart failure (H)  (R63.5) Weight gain  Comment: Chronic. Baseline Creatinine~ 1-1.2. Based on JNC-8 goals,  patients age of 78 year old, presence of diabetes or CKD, and goals of care goal BP is  <140/90 mm Hg. Patient is stable with current plan of care and routine assessment.. INR goal 2-3. Last INR via fingerstick was 1.9 on 3/8, INR today- 2.3 via fingerstick today. Trending weight gain present despite adjustment of torsemide dose. Attempted to transition to furosemide with no relief. Did receive one time dose of metolazone 2.5mg on 2/22/24. Weight decreased down to 239# today. She is now open to resuming metolazone once a week for now until weight stabilizes.   Plan:   -Monitor for worsening s/symptoms of concerns  -Monitor BP and HR as directed  -Continue asa 81mg daily  -Continue metolazone 2.5mg once a week on Thursdays for now.   -Continue atorvastatin 40mg daily  -Continue coumadin 3mg on Mon/Thurs and 2.5mg AOD.   -Monitor weights as directed daily. Baseline weight around 222-224# on admission but noted highest weight noted to be 242#. Weight today 229#  -Continue torsemide 40mg daily. HOLD if SBP<100  -Continue lymphedema therapy to bilateral lower extremities as directed.   -Would benefit from cardiology referral. Declines at this time as she believes she does not have CHF history.   -BMP, CBC and INR due today 3/22/24  -Trend  labs routinely and PRN  -INR due Monday 4/1/24 for discharge planning needs         (I89.0) Lymphedema  (I87.8) Venous stasis  Comment: Acute on chronic. Suspect edema may also be contributing to wound concerns to RLE. Seen by wound provider in clinic on 2/26/24. Areas RESOLVED.   Plan:  -Continue Lymphedema therapy to assess and assist with edema control MWF on site.   -Follow up with wound clinic as directed PRN  -Monitor RLE for worsening s/symptoms of concerns. No dressings needed at this time.   -BMP, CBC and INR due today 3/22/24  -Trend labs routinely and PRN     (E11.621,  L97.425) Diabetic ulcer of left heel associated with type 2 diabetes mellitus, with muscle involvement without evidence of necrosis (H)  (Z89.512) 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)  (Z98.890) Status post debridement  (M86.9) Osteomyelitis of left foot, unspecified type (H)  (F11.20) Opioid dependence, uncomplicated (H)  (G89.4) Chronic pain syndrome  (I83.019,  I83.029,  L97.919,  L97.929) Venous stasis ulcers of both lower extremities (H)  (I89.0) Lymphedema  (Z94.5) History of skin graft placement on 1/2/24 as above  Comment: Chronic. S/p debridement 9/26/23. S/p Left guillotine BKA on 10/7/23. S/p tibial/fibular resection and debridement of stump 10/17/23. Followed by vascular. Completed course of meropenem/vancomycin 10/8 for osteomyelitis. Surgery completed 1/2/24 per review.   Plan:   -Monitor pain complaints  -Continue methadone 10mg daily at 1500 and 5mg TID scheduled.   -Tylenol PRN  -Continue orthotic lymph  needs as directed and advised per Soham in preparation for prosthetic.   -Follow up with vascular as directed PRN. Prosthetic follow up as directed  -Aquaphor to thigh and stump BID  -BMP, CBC and INR due today 3/22/24     (K59.01) Constipation  Comment: Acute on chronic. Reports harder stools. Declines offer for scheduling agents.   Plan:  -Miralax BID PRN  -Senna BID PRN  -Monitor BM  "patterns      Electronically Signed by:  Dr. Haylie Rowe DNP, APRN, FNP-C, WCS-C, EDS-C      Face to Face and Medical Necessity Statement for DME Provider visit    Demographic Information on Linda Loredo:  Gender: female  : 1945  [address] [facility] [address]  [phone] (home)     Medical Record: [id number]  Social Security Number: [id number]  Primary Care Provider: Haylie Rowe  Insurance: Payor: [facility] / Plan: ARE MEDICARE / Product Type: HMO /     HPI:   Linda Loredo is a 79 year old  (1945), who is being seen today for a face to face provider visit at [facility]; medical necessity statement for DME included. This patient requires the following:  DME Ordered and Medical Necessity Statement     Recliner. Patient will be independent with use of recliner in [facility] apartment setting. Able to use independently and will assist/promote continued independence.     2. Shower chair with back. Patient will be independent with use of shower chair device. Able to use within her new [facility] apartment without concerns    3. Hospital Bed: variable height, semi-electric  Mattress Type: Standard  Rail Type: half    Height:   Ht Readings from Last 2 Encounters:   24 1.676 m (5' 6\")   24 1.676 m (5' 6\")     Weight:  Wt Readings from Last 2 Encounters:   24 104.1 kg (229 lb 9.6 oz)   24 103.3 kg (227 lb 12.8 oz)     The patient does require positioning of the body in ways not feasible with an ordinary bed due to a medical condition that is expected to last at least 1 month due to CHF, alleviation of chronic pain, along with left below knee amputation. The patient does require the head of bed elevated more than 30* most of the time due to CHF. The patient does not require traction that can only be attached to a hospital bed. The patient does  require a bed height different than a fixed height hospital bed to permit tranfers to " wheelchair or standing position. The patient does  require frequent or immediate changes in body position due to CHF, alleviation of chronic pain, along with left below knee amputation.     Pt needing above DME with expected length of need of 99 year  due to medical necessity associated with following diagnosis:     Type 2 diabetes mellitus with diabetic polyneuropathy, with long-term current use of insulin (H)  Morbid obesity (H)  PAD (peripheral artery disease) (H24)  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)  Paroxysmal atrial fibrillation (H)  Venous stasis  Lymphedema  Chronic right-sided heart failure (H)  Stage 3a chronic kidney disease (H)  Opioid dependence, uncomplicated (H)  Warfarin-induced coagulopathy (H24)  Long term (current) use of anticoagulants  Weight gain  Essential hypertension  Other chronic osteomyelitis of left foot (H)  Open wound  Diabetic ulcer of left heel associated with type 2 diabetes mellitus, with necrosis of bone (H)  Acute blood loss anemia  Dyslipidemia  Persistent atrial fibrillation (H)  Severe obesity (BMI 35.0-39.9) with comorbidity (H)  Slow transit constipation  Hx of skin graft  Severe obesity (H)  Status post debridement  Skin lesion  Drug-induced constipation  Primary hyperparathyroidism (H24)  Physical deconditioning  Chronic obstructive pulmonary disease, unspecified COPD type (H)  Major depressive disorder, recurrent episode, moderate (H)      PMH   has a past medical history of Depressive disorder, not elsewhere classified, Herpes zoster without mention of complication, Hypercalcemia (2/24/2007), Mild intermittent asthma, Other urinary incontinence, Type II or unspecified type diabetes mellitus with renal manifestations, uncontrolled(250.42) (H), Type II or unspecified type diabetes mellitus without mention of complication, not stated as uncontrolled, and Unspecified essential hypertension.    ROS:4 point ROS including Respiratory, CV, GI and ,  "other than that noted in the HPI,  is negative    EXAM  Vitals: /73   Pulse 91   Temp 98.8  F (37.1  C)   Resp 17   Ht 1.676 m (5' 6\")   Wt 104.1 kg (229 lb 9.6 oz)   SpO2 100%   BMI 37.06 kg/m  ;BMI= Body mass index is 37.06 kg/m .  GENERAL APPEARANCE:  Alert, in no distress, oriented, cooperative  ENT:  Mouth and posterior oropharynx normal, moist mucous membranes, normal hearing acuity  EYES:  EOM, conjunctivae, lids, pupils and irises normal  NECK:  No adenopathy,masses or thyromegaly  RESP:  respiratory effort and palpation of chest normal, lungs clear to auscultation , no respiratory distress  CV:  Palpation and auscultation of heart done , regular rate and rhythm, no murmur, rub, or gallop, no edema  ABDOMEN:  normal bowel sounds, soft, nontender, no hepatosplenomegaly or other masses, no guarding or rebound  M/S:   Wheelchair for main mobility. Able to independent transfer self and provide own ADL needs  SKIN:  Inspection of skin and subcutaneous tissue baseline, Palpation of skin and subcutaneous tissue baseline, see photos  NEURO:   Cranial nerves 2-12 are normal tested and grossly at patient's baseline, no purposeful movement in upper and lower extremities  PSYCH:  oriented X 3, normal insight, judgement and memory, affect and mood normal     ASSESSMENT/PLAN:  1. Type 2 diabetes mellitus with diabetic polyneuropathy, with long-term current use of insulin (H)    2. Morbid obesity (H)    3. PAD (peripheral artery disease) (H24)    4. 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)    5. Paroxysmal atrial fibrillation (H)    6. Venous stasis    7. Lymphedema    8. Chronic right-sided heart failure (H)    9. Stage 3a chronic kidney disease (H)    10. Opioid dependence, uncomplicated (H)    11. Warfarin-induced coagulopathy  (H24)    12. Long term (current) use of anticoagulants    13. Weight gain    14. Essential hypertension    15. Other chronic osteomyelitis of left foot (H)  "   16. Open wound    17. Diabetic ulcer of left heel associated with type 2 diabetes mellitus, with necrosis of bone (H)    18. Acute blood loss anemia    19. Dyslipidemia    20. Persistent atrial fibrillation (H)    21. Severe obesity (BMI 35.0-39.9) with comorbidity (H)    22. Slow transit constipation    23. Hx of skin graft    24. Severe obesity (H)    25. Status post debridement    26. Skin lesion    27. Drug-induced constipation    28. Primary hyperparathyroidism (H24)    29. Physical deconditioning    30. Chronic obstructive pulmonary disease, unspecified COPD type (H)    31. Major depressive disorder, recurrent episode, moderate (H)        Orders:  1. Facility staff/TC to contact DME company to get their order form for provider to fill out    ELECTRONICALLY SIGNED BY ROJELIO CERTIFIED PROVIDER:  MOE Fitch CNP   NPI: [id number]  [facility]  [address] [place] [address], [address]  [place] [address]               Sincerely,        MOE Fitch CNP

## 2024-03-22 NOTE — LETTER
3/22/2024        RE: Linda Loredo  [address] [facility] [address]        M [facility]    Chief Complaint   Patient presents with     RECHECK     HPI:  Linda Loredo is a 79 year old  (1945), who is being seen today for an episodic care visit at: [facility] ([facility] & ) (NF) [[id number]]. Today's concern is: The primary encounter diagnosis was Type 2 diabetes mellitus with diabetic polyneuropathy, with long-term current use of insulin (H). Diagnoses of Morbid obesity (H), PAD (peripheral artery disease) (H24), 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), Paroxysmal atrial fibrillation (H), Venous stasis, Lymphedema, Chronic right-sided heart failure (H), Stage 3a chronic kidney disease (H), Opioid dependence, uncomplicated (H), Warfarin-induced coagulopathy  (H24), Long term (current) use of anticoagulants, Weight gain, Essential hypertension, Other chronic osteomyelitis of left foot (H), Open wound, Diabetic ulcer of left heel associated with type 2 diabetes mellitus, with necrosis of bone (H), Acute blood loss anemia, Dyslipidemia, Persistent atrial fibrillation (H), Severe obesity (BMI 35.0-39.9) with comorbidity (H), Slow transit constipation, Hx of skin graft, Severe obesity (H), Status post debridement, Skin lesion, Drug-induced constipation, Primary hyperparathyroidism (H24), Physical deconditioning, Chronic obstructive pulmonary disease, unspecified COPD type (H), and Major depressive disorder, recurrent episode, moderate (H) were also pertinent to this visit.    Met with patient who denies any chest pain, palpitations, shortness of breath, RASHID, lightheadedness, dizziness, or cough. Denies any abdominal discomfort. Denies N&V. Denies B&B concerns. Denies dysuria or frequency. Denies loose or constipation. Appetite good. Sleeping well. Blood Glucose values having been improving since insulin adjustment. Skin  "to RLE improvement and resolved. Patient is eager to be able to discharge soon to new [facility] setting in April.     BP Readings from Last 3 Encounters:   03/22/24 123/73   03/18/24 112/72   03/18/24 110/69     Wt Readings from Last 5 Encounters:   03/22/24 104.1 kg (229 lb 9.6 oz)   03/18/24 103.3 kg (227 lb 12.8 oz)   03/08/24 107.6 kg (237 lb 3.2 oz)   03/01/24 109.5 kg (241 lb 6.4 oz)   02/23/24 108.3 kg (238 lb 12.8 oz)     Allergies, and PMH/PSH reviewed in EPIC today.  REVIEW OF SYSTEMS:  4 point ROS including Respiratory, CV, GI and , other than that noted in the HPI,  is negative    Objective:   /73   Pulse 91   Temp 98.8  F (37.1  C)   Resp 17   Ht 1.676 m (5' 6\")   Wt 104.1 kg (229 lb 9.6 oz)   SpO2 100%   BMI 37.06 kg/m    GENERAL APPEARANCE:  Alert, in no distress, oriented, cooperative  ENT:  Mouth and posterior oropharynx normal, moist mucous membranes, normal hearing acuity  EYES:  EOM, conjunctivae, lids, pupils and irises normal  NECK:  No adenopathy,masses or thyromegaly  RESP:  respiratory effort and palpation of chest normal, lungs clear to auscultation , no respiratory distress  CV:  Palpation and auscultation of heart done , regular rate and rhythm, no murmur, rub, or gallop, no edema  ABDOMEN:  normal bowel sounds, soft, nontender, no hepatosplenomegaly or other masses, no guarding or rebound  M/S:   Wheelchair for main mobility. Able to independent transfer self and provide own ADL needs  SKIN:  Inspection of skin and subcutaneous tissue baseline, Palpation of skin and subcutaneous tissue baseline, see photos  NEURO:   Cranial nerves 2-12 are normal tested and grossly at patient's baseline, no purposeful movement in upper and lower extremities  PSYCH:  oriented X 3, normal insight, judgement and memory, affect and mood normal              Most Recent 3 CBC's:  Recent Labs   Lab Test 03/01/24  1309 02/02/24  1043 12/27/23  0958   WBC 7.0 9.2 8.0   HGB 10.3* 10.9* 10.5*   MCV 92 91 " 90    200 199     Most Recent 3 BMP's:  Recent Labs   Lab Test 03/01/24  1319 02/16/24  1111 02/02/24  1043    138 138   POTASSIUM 4.1 3.8 4.5   CHLORIDE 92* 96* 97*   CO2 37* 31* 33*   BUN 34.8* 33.1* 46.2*   CR 1.05* 1.00* 1.04*   ANIONGAP 8 11 8   ZAKIA 10.3* 10.4* 10.5*   * 253* 228*     Most Recent 2 LFT's:  Recent Labs   Lab Test 11/17/23  1033 11/01/23  0613   AST 20 19   ALT 11 12   ALKPHOS 90 97   BILITOTAL 0.5 0.2     Most Recent 3 INR's:  Recent Labs   Lab Test 03/01/24  1319 02/16/24  1111 01/02/24  0857   INR 1.64* 2.08* 1.47*     Most Recent TSH and T4:  Recent Labs   Lab Test 11/24/23  1012   TSH 3.46   T4 1.20     Most Recent Hemoglobin A1c:  Recent Labs   Lab Test 03/01/24  1319   A1C 9.9*     Most Recent Anemia Panel:  Recent Labs   Lab Test 03/01/24  1309   WBC 7.0   HGB 10.3*   HCT 32.9*   MCV 92          ASSESSMENT/PLAN:  (E11.621,  L97.509,  Z79.4) Type 2 diabetes mellitus with foot ulcer, with long-term current use of insulin (H)  (E66.01) Obesity, Class III, BMI 40-49.9 (morbid obesity) (H)  Comment: Chronic. Last A1c  today 9.9%. Goal <9%. Discussed history of being on metformin in the past. No longer on this and she does not know why. I suspect it may be due to GUSTAVO history. She reports being followed by endocrine and is wanting to see them again. Per last endo note from 6 months ago, patient was not on any mealtime insulin with recommendations to start ozempic once a week with continuing tresiba. Patient also reports wanting to restart her insulin pump as she reports having one <1 year ago as this was stopped by her previous PCP   Plan:   -Monitor Blood Glucose QID as directed  -Discontinued sliding scale coverage due to transitioning to [facility] setting within the next couple weeks. Started insulin aspart 3units TID with meals. HOLD if Blood Glucose<120.   -Continue tresiba 45 units at HS  -Advised her to schedule follow up with endocrinology and staff to assist with  scheduling transportation. Pending appt. Follow up with Dr Remy Villareal with [facility] [phone]   -BMP, CBC and INR due today 3/22/24  -Trend labs routinely and PRN  -Trend hgb A1c in 3 months. Due June 2024            (I48.0) Paroxysmal atrial fibrillation (H)  (Z79.01) Long term (current) use of anticoagulants  (D68.32,  T45.515A) Warfarin-induced coagulopathy   (I10) Essential hypertension with goal blood pressure less than 140/90  (E78.2) Mixed hyperlipidemia  (N18.30) Stage 3 chronic kidney disease, unspecified whether stage 3a or 3b CKD (H)  (I50.812) Chronic right-sided heart failure (H)  (R63.5) Weight gain  Comment: Chronic. Baseline Creatinine~ 1-1.2. Based on JNC-8 goals,  patients age of 78 year old, presence of diabetes or CKD, and goals of care goal BP is  <140/90 mm Hg. Patient is stable with current plan of care and routine assessment.. INR goal 2-3. Last INR via fingerstick was 1.9 on 3/8, INR today- 2.3 via fingerstick today. Trending weight gain present despite adjustment of torsemide dose. Attempted to transition to furosemide with no relief. Did receive one time dose of metolazone 2.5mg on 2/22/24. Weight decreased down to 239# today. She is now open to resuming metolazone once a week for now until weight stabilizes.   Plan:   -Monitor for worsening s/symptoms of concerns  -Monitor BP and HR as directed  -Continue asa 81mg daily  -Continue metolazone 2.5mg once a week on Thursdays for now.   -Continue atorvastatin 40mg daily  -Continue coumadin 3mg on Mon/Thurs and 2.5mg AOD.   -Monitor weights as directed daily. Baseline weight around 222-224# on admission but noted highest weight noted to be 242#. Weight today 229#  -Continue torsemide 40mg daily. HOLD if SBP<100  -Continue lymphedema therapy to bilateral lower extremities as directed.   -Would benefit from cardiology referral. Declines at this time as she believes she does not have CHF history.   -BMP, CBC and INR due today 3/22/24  -Trend  labs routinely and PRN  -INR due Monday 4/1/24 for discharge planning needs         (I89.0) Lymphedema  (I87.8) Venous stasis  Comment: Acute on chronic. Suspect edema may also be contributing to wound concerns to RLE. Seen by wound provider in clinic on 2/26/24. Areas RESOLVED.   Plan:  -Continue Lymphedema therapy to assess and assist with edema control MWF on site.   -Follow up with wound clinic as directed PRN  -Monitor RLE for worsening s/symptoms of concerns. No dressings needed at this time.   -BMP, CBC and INR due today 3/22/24  -Trend labs routinely and PRN     (E11.621,  L97.425) Diabetic ulcer of left heel associated with type 2 diabetes mellitus, with muscle involvement without evidence of necrosis (H)  (Z89.512) 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)  (Z98.890) Status post debridement  (M86.9) Osteomyelitis of left foot, unspecified type (H)  (F11.20) Opioid dependence, uncomplicated (H)  (G89.4) Chronic pain syndrome  (I83.019,  I83.029,  L97.919,  L97.929) Venous stasis ulcers of both lower extremities (H)  (I89.0) Lymphedema  (Z94.5) History of skin graft placement on 1/2/24 as above  Comment: Chronic. S/p debridement 9/26/23. S/p Left guillotine BKA on 10/7/23. S/p tibial/fibular resection and debridement of stump 10/17/23. Followed by vascular. Completed course of meropenem/vancomycin 10/8 for osteomyelitis. Surgery completed 1/2/24 per review.   Plan:   -Monitor pain complaints  -Continue methadone 10mg daily at 1500 and 5mg TID scheduled.   -Tylenol PRN  -Continue orthotic lymph  needs as directed and advised per Soham in preparation for prosthetic.   -Follow up with vascular as directed PRN. Prosthetic follow up as directed  -Aquaphor to thigh and stump BID  -BMP, CBC and INR due today 3/22/24     (K59.01) Constipation  Comment: Acute on chronic. Reports harder stools. Declines offer for scheduling agents.   Plan:  -Miralax BID PRN  -Senna BID PRN  -Monitor BM  "patterns      Electronically Signed by:  Dr. Haylie Rowe DNP, APRN, FNP-C, WCS-C, EDS-C      Face to Face and Medical Necessity Statement for DME Provider visit    Demographic Information on Linda Loredo:  Gender: female  : 1945  [address] [facility] [address]  [phone] (home)     Medical Record: [id number]  Social Security Number: [id number]  Primary Care Provider: Haylie Rowe  Insurance: Payor: [facility] / Plan: ARE MEDICARE / Product Type: HMO /     HPI:   Linda Loredo is a 79 year old  (1945), who is being seen today for a face to face provider visit at [facility]; medical necessity statement for DME included. This patient requires the following:  DME Ordered and Medical Necessity Statement     Recliner. Patient will be independent with use of recliner in [facility] apartment setting. Able to use independently and will assist/promote continued independence.     2. Shower chair with back. Patient will be independent with use of shower chair device. Able to use within her new [facility] apartment without concerns    3. Hospital Bed: variable height, semi-electric  Mattress Type: Standard  Rail Type: half    Height:   Ht Readings from Last 2 Encounters:   24 1.676 m (5' 6\")   24 1.676 m (5' 6\")     Weight:  Wt Readings from Last 2 Encounters:   24 104.1 kg (229 lb 9.6 oz)   24 103.3 kg (227 lb 12.8 oz)     The patient does require positioning of the body in ways not feasible with an ordinary bed due to a medical condition that is expected to last at least 1 month due to CHF, alleviation of chronic pain, along with left below knee amputation. The patient does require the head of bed elevated more than 30* most of the time due to CHF. The patient does not require traction that can only be attached to a hospital bed. The patient does  require a bed height different than a fixed height hospital bed to permit tranfers to " wheelchair or standing position. The patient does  require frequent or immediate changes in body position due to CHF, alleviation of chronic pain, along with left below knee amputation.     Pt needing above DME with expected length of need of 99 year  due to medical necessity associated with following diagnosis:     Type 2 diabetes mellitus with diabetic polyneuropathy, with long-term current use of insulin (H)  Morbid obesity (H)  PAD (peripheral artery disease) (H24)  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)  Paroxysmal atrial fibrillation (H)  Venous stasis  Lymphedema  Chronic right-sided heart failure (H)  Stage 3a chronic kidney disease (H)  Opioid dependence, uncomplicated (H)  Warfarin-induced coagulopathy (H24)  Long term (current) use of anticoagulants  Weight gain  Essential hypertension  Other chronic osteomyelitis of left foot (H)  Open wound  Diabetic ulcer of left heel associated with type 2 diabetes mellitus, with necrosis of bone (H)  Acute blood loss anemia  Dyslipidemia  Persistent atrial fibrillation (H)  Severe obesity (BMI 35.0-39.9) with comorbidity (H)  Slow transit constipation  Hx of skin graft  Severe obesity (H)  Status post debridement  Skin lesion  Drug-induced constipation  Primary hyperparathyroidism (H24)  Physical deconditioning  Chronic obstructive pulmonary disease, unspecified COPD type (H)  Major depressive disorder, recurrent episode, moderate (H)      PMH   has a past medical history of Depressive disorder, not elsewhere classified, Herpes zoster without mention of complication, Hypercalcemia (2/24/2007), Mild intermittent asthma, Other urinary incontinence, Type II or unspecified type diabetes mellitus with renal manifestations, uncontrolled(250.42) (H), Type II or unspecified type diabetes mellitus without mention of complication, not stated as uncontrolled, and Unspecified essential hypertension.    ROS:4 point ROS including Respiratory, CV, GI and ,  "other than that noted in the HPI,  is negative    EXAM  Vitals: /73   Pulse 91   Temp 98.8  F (37.1  C)   Resp 17   Ht 1.676 m (5' 6\")   Wt 104.1 kg (229 lb 9.6 oz)   SpO2 100%   BMI 37.06 kg/m  ;BMI= Body mass index is 37.06 kg/m .  GENERAL APPEARANCE:  Alert, in no distress, oriented, cooperative  ENT:  Mouth and posterior oropharynx normal, moist mucous membranes, normal hearing acuity  EYES:  EOM, conjunctivae, lids, pupils and irises normal  NECK:  No adenopathy,masses or thyromegaly  RESP:  respiratory effort and palpation of chest normal, lungs clear to auscultation , no respiratory distress  CV:  Palpation and auscultation of heart done , regular rate and rhythm, no murmur, rub, or gallop, no edema  ABDOMEN:  normal bowel sounds, soft, nontender, no hepatosplenomegaly or other masses, no guarding or rebound  M/S:   Wheelchair for main mobility. Able to independent transfer self and provide own ADL needs  SKIN:  Inspection of skin and subcutaneous tissue baseline, Palpation of skin and subcutaneous tissue baseline, see photos  NEURO:   Cranial nerves 2-12 are normal tested and grossly at patient's baseline, no purposeful movement in upper and lower extremities  PSYCH:  oriented X 3, normal insight, judgement and memory, affect and mood normal     ASSESSMENT/PLAN:  1. Type 2 diabetes mellitus with diabetic polyneuropathy, with long-term current use of insulin (H)    2. Morbid obesity (H)    3. PAD (peripheral artery disease) (H24)    4. 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)    5. Paroxysmal atrial fibrillation (H)    6. Venous stasis    7. Lymphedema    8. Chronic right-sided heart failure (H)    9. Stage 3a chronic kidney disease (H)    10. Opioid dependence, uncomplicated (H)    11. Warfarin-induced coagulopathy  (H24)    12. Long term (current) use of anticoagulants    13. Weight gain    14. Essential hypertension    15. Other chronic osteomyelitis of left foot (H)  "   16. Open wound    17. Diabetic ulcer of left heel associated with type 2 diabetes mellitus, with necrosis of bone (H)    18. Acute blood loss anemia    19. Dyslipidemia    20. Persistent atrial fibrillation (H)    21. Severe obesity (BMI 35.0-39.9) with comorbidity (H)    22. Slow transit constipation    23. Hx of skin graft    24. Severe obesity (H)    25. Status post debridement    26. Skin lesion    27. Drug-induced constipation    28. Primary hyperparathyroidism (H24)    29. Physical deconditioning    30. Chronic obstructive pulmonary disease, unspecified COPD type (H)    31. Major depressive disorder, recurrent episode, moderate (H)        Orders:  1. Facility staff/TC to contact DME company to get their order form for provider to fill out    ELECTRONICALLY SIGNED BY ROJELIO CERTIFIED PROVIDER:  MOE Fitch CNP   NPI: [id number]  [facility]  [address] [place] [address], [address]  [place] [address]               Sincerely,        MOE Fitch CNP

## 2024-03-23 LAB
ANION GAP SERPL CALCULATED.3IONS-SCNC: 9 MMOL/L (ref 7–15)
BUN SERPL-MCNC: 34.6 MG/DL (ref 8–23)
CALCIUM SERPL-MCNC: 10.4 MG/DL (ref 8.8–10.2)
CHLORIDE SERPL-SCNC: 95 MMOL/L (ref 98–107)
CREAT SERPL-MCNC: 1.07 MG/DL (ref 0.51–0.95)
DEPRECATED HCO3 PLAS-SCNC: 38 MMOL/L (ref 22–29)
EGFRCR SERPLBLD CKD-EPI 2021: 53 ML/MIN/1.73M2
GLUCOSE SERPL-MCNC: 181 MG/DL (ref 70–99)
POTASSIUM SERPL-SCNC: 3.8 MMOL/L (ref 3.4–5.3)
SODIUM SERPL-SCNC: 142 MMOL/L (ref 135–145)

## 2024-03-29 ENCOUNTER — DISCHARGE SUMMARY NURSING HOME (OUTPATIENT)
Dept: GERIATRICS | Facility: CLINIC | Age: 79
End: 2024-03-29
Payer: COMMERCIAL

## 2024-03-29 VITALS
TEMPERATURE: 97.7 F | OXYGEN SATURATION: 99 % | RESPIRATION RATE: 16 BRPM | DIASTOLIC BLOOD PRESSURE: 70 MMHG | BODY MASS INDEX: 37.32 KG/M2 | SYSTOLIC BLOOD PRESSURE: 123 MMHG | HEIGHT: 66 IN | WEIGHT: 232.2 LBS | HEART RATE: 86 BPM

## 2024-03-29 DIAGNOSIS — M86.672 OTHER CHRONIC OSTEOMYELITIS OF LEFT FOOT (H): ICD-10-CM

## 2024-03-29 DIAGNOSIS — R63.5 WEIGHT GAIN: ICD-10-CM

## 2024-03-29 DIAGNOSIS — Z79.01 LONG TERM (CURRENT) USE OF ANTICOAGULANTS: ICD-10-CM

## 2024-03-29 DIAGNOSIS — E66.01 SEVERE OBESITY (BMI 35.0-39.9) WITH COMORBIDITY (H): ICD-10-CM

## 2024-03-29 DIAGNOSIS — R53.81 PHYSICAL DECONDITIONING: ICD-10-CM

## 2024-03-29 DIAGNOSIS — L97.424 DIABETIC ULCER OF LEFT HEEL ASSOCIATED WITH TYPE 2 DIABETES MELLITUS, WITH NECROSIS OF BONE (H): ICD-10-CM

## 2024-03-29 DIAGNOSIS — E78.5 DYSLIPIDEMIA: ICD-10-CM

## 2024-03-29 DIAGNOSIS — I73.9 PAD (PERIPHERAL ARTERY DISEASE) (H): ICD-10-CM

## 2024-03-29 DIAGNOSIS — E21.0 PRIMARY HYPERPARATHYROIDISM (H): ICD-10-CM

## 2024-03-29 DIAGNOSIS — F11.20 OPIOID DEPENDENCE, UNCOMPLICATED (H): ICD-10-CM

## 2024-03-29 DIAGNOSIS — E11.42 TYPE 2 DIABETES MELLITUS WITH DIABETIC POLYNEUROPATHY, WITH LONG-TERM CURRENT USE OF INSULIN (H): Primary | ICD-10-CM

## 2024-03-29 DIAGNOSIS — I48.0 PAROXYSMAL ATRIAL FIBRILLATION (H): ICD-10-CM

## 2024-03-29 DIAGNOSIS — I89.0 LYMPHEDEMA: ICD-10-CM

## 2024-03-29 DIAGNOSIS — N18.31 STAGE 3A CHRONIC KIDNEY DISEASE (H): ICD-10-CM

## 2024-03-29 DIAGNOSIS — K59.01 SLOW TRANSIT CONSTIPATION: ICD-10-CM

## 2024-03-29 DIAGNOSIS — I48.19 PERSISTENT ATRIAL FIBRILLATION (H): ICD-10-CM

## 2024-03-29 DIAGNOSIS — T14.8XXA OPEN WOUND: ICD-10-CM

## 2024-03-29 DIAGNOSIS — L98.9 SKIN LESION: ICD-10-CM

## 2024-03-29 DIAGNOSIS — J44.9 CHRONIC OBSTRUCTIVE PULMONARY DISEASE, UNSPECIFIED COPD TYPE (H): ICD-10-CM

## 2024-03-29 DIAGNOSIS — Z94.5 HX OF SKIN GRAFT: ICD-10-CM

## 2024-03-29 DIAGNOSIS — Z79.4 TYPE 2 DIABETES MELLITUS WITH DIABETIC POLYNEUROPATHY, WITH LONG-TERM CURRENT USE OF INSULIN (H): Primary | ICD-10-CM

## 2024-03-29 DIAGNOSIS — Z89.512 STATUS POST BELOW-KNEE AMPUTATION OF LEFT LOWER EXTREMITY (H): ICD-10-CM

## 2024-03-29 DIAGNOSIS — F33.1 MAJOR DEPRESSIVE DISORDER, RECURRENT EPISODE, MODERATE (H): ICD-10-CM

## 2024-03-29 DIAGNOSIS — E66.01 MORBID OBESITY (H): ICD-10-CM

## 2024-03-29 DIAGNOSIS — I50.812 CHRONIC RIGHT-SIDED HEART FAILURE (H): ICD-10-CM

## 2024-03-29 DIAGNOSIS — D62 ACUTE BLOOD LOSS ANEMIA: ICD-10-CM

## 2024-03-29 DIAGNOSIS — I10 ESSENTIAL HYPERTENSION: ICD-10-CM

## 2024-03-29 DIAGNOSIS — E66.01 SEVERE OBESITY (H): ICD-10-CM

## 2024-03-29 DIAGNOSIS — E11.621 DIABETIC ULCER OF LEFT HEEL ASSOCIATED WITH TYPE 2 DIABETES MELLITUS, WITH NECROSIS OF BONE (H): ICD-10-CM

## 2024-03-29 DIAGNOSIS — Z98.890 STATUS POST DEBRIDEMENT: ICD-10-CM

## 2024-03-29 DIAGNOSIS — D68.32 WARFARIN-INDUCED COAGULOPATHY (H): ICD-10-CM

## 2024-03-29 DIAGNOSIS — I87.8 VENOUS STASIS: ICD-10-CM

## 2024-03-29 DIAGNOSIS — T45.515A WARFARIN-INDUCED COAGULOPATHY (H): ICD-10-CM

## 2024-03-29 DIAGNOSIS — K59.03 DRUG-INDUCED CONSTIPATION: ICD-10-CM

## 2024-03-29 PROCEDURE — 99316 NF DSCHRG MGMT 30 MIN+: CPT | Performed by: NURSE PRACTITIONER

## 2024-03-29 RX ORDER — WARFARIN SODIUM 3 MG/1
3 TABLET ORAL
COMMUNITY
Start: 2024-03-22 | End: 2024-04-01

## 2024-03-29 RX ORDER — WARFARIN SODIUM 2.5 MG/1
2.5 TABLET ORAL
COMMUNITY
Start: 2024-03-22 | End: 2024-04-01

## 2024-03-29 NOTE — LETTER
3/29/2024        RE: Linda Loredo  [address] [facility] [address]        [facility] GERIATRICS DISCHARGE SUMMARY  PATIENT'S NAME: Linda Loredo  YOB: 1945  MEDICAL RECORD NUMBER:  [id number]  Place of Service where encounter took place:  [facility] ([facility]) [[id number]]    PRIMARY CARE PROVIDER AND CLINIC RESPONSIBLE AFTER TRANSFER:   Will be signing onto in house services in [place] setting   Non-[facility] Provider     Transferring providers: MOE Fitch CNP, Courtney uRiz MD  Recent Hospitalization/ED:  [facility]  stay 10/24/23 to 11/15/23.  Date of SNF Admission:  11/15/24  Date of SNF (anticipated) Discharge:  4/2/24  Discharged to: new assisted living for patient [place]  Cognitive Scores: BIMS: 15/15  Physical Function: Wheelchair dependent and Pivot transfers  DME: DME F2F done     CODE STATUS/ADVANCE DIRECTIVES DISCUSSION:  Prior   ALLERGIES: Prednisone, Morphine, and Morphine [fumaric acid]    NURSING FACILITY COURSE   Medication Changes/Rationale:   See below    Summary of nursing facility stay:     (E11.621,  L97.509,  Z79.4) Type 2 diabetes mellitus with foot ulcer, with long-term current use of insulin (H)  (E66.01) Obesity, Class III, BMI 40-49.9 (morbid obesity) (H)  Comment: Chronic. Last A1c 9.9%. Goal <9%. Discussed history of being on metformin in the past. No longer on this and she does not know why. I suspect it may be due to GUSTAVO history. She reports being followed by endocrine and is wanting to see them again. Per last endo note from 6 months ago, patient was not on any mealtime insulin with recommendations to start ozempic once a week with continuing tresiba. Patient also reports wanting to restart her insulin pump as she reports having one <1 year ago as this was stopped by her previous PCP   Plan:   -Monitor Blood Glucose QID as directed  -Discontinued  sliding scale coverage due to transitioning to [facility] setting within the next couple weeks. Started insulin aspart 3units TID with meals. HOLD if Blood Glucose<120.   -Continue tresiba 45 units at HS  -Advised her to schedule follow up with endocrinology and staff to assist with scheduling transportation. Scheduled for 4/1/24  -Trend labs with PCP post TCU  -Trend hgb A1c in 3 months. Due June 2024             (I48.0) Paroxysmal atrial fibrillation (H)  (Z79.01) Long term (current) use of anticoagulants  (D68.32,  T45.515A) Warfarin-induced coagulopathy   (I10) Essential hypertension with goal blood pressure less than 140/90  (E78.2) Mixed hyperlipidemia  (N18.30) Stage 3 chronic kidney disease, unspecified whether stage 3a or 3b CKD (H)  (I50.812) Chronic right-sided heart failure (H)  (R63.5) Weight gain  Comment: Chronic. Baseline Creatinine~ 1-1.2. Based on JNC-8 goals,  patients age of 78 year old, presence of diabetes or CKD, and goals of care goal BP is  <140/90 mm Hg. Patient is stable with current plan of care and routine assessment.. INR goal 2-3. Last INR via fingerstick was 1.9 on 3/8, INR today- 2.3 via fingerstick today. Trending weight gain present despite adjustment of torsemide dose. Attempted to transition to furosemide with no relief. She is now open to resuming metolazone once a week for now until weight stabilizes.   Plan:   -Monitor for worsening s/symptoms of concerns  -Monitor BP and HR as directed  -Continue asa 81mg daily  -Continue metolazone 2.5mg once a week on Thursdays for now.   -Continue atorvastatin 40mg daily  -Continue coumadin 3mg on Mon/Thurs and 2.5mg AOD.   -Monitor weights as directed daily. Baseline weight around 222-224# on admission but noted highest weight noted to be 242#. Weight today 232#  -Continue torsemide 40mg daily. HOLD if SBP<100  -Continue lymphedema therapy to bilateral lower extremities as directed.   -Would benefit from cardiology referral. Declines at this  time as she believes she does not have CHF history.   -Trend labs with PCP post TCU  -INR due Monday 4/1/24 for discharge planning needs      (I89.0) Lymphedema  (I87.8) Venous stasis  Comment: Acute on chronic. Suspect edema may also be contributing to wound concerns to RLE. Seen by wound provider in clinic on 2/26/24. Areas RESOLVED.   Plan:  -Continue Lymphedema therapy to assess and assist with edema control MWF on site.   -Follow up with wound clinic as directed PRN  -Monitor RLE for worsening s/symptoms of concerns. No dressings needed at this time.   -Trend labs with PCP post TCU     (E11.621,  L97.425) Diabetic ulcer of left heel associated with type 2 diabetes mellitus, with muscle involvement without evidence of necrosis (H)  (Z89.512) 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)  (Z98.890) Status post debridement  (M86.9) Osteomyelitis of left foot, unspecified type (H)  (F11.20) Opioid dependence, uncomplicated (H)  (G89.4) Chronic pain syndrome  (I83.019,  I83.029,  L97.919,  L97.929) Venous stasis ulcers of both lower extremities (H)  (I89.0) Lymphedema  (Z94.5) History of skin graft placement on 1/2/24 as above  Comment: Chronic. S/p debridement 9/26/23. S/p Left guillotine BKA on 10/7/23. S/p tibial/fibular resection and debridement of stump 10/17/23. Followed by vascular. Completed course of meropenem/vancomycin 10/8 for osteomyelitis. Surgery completed 1/2/24 per review.   Plan:   -Monitor pain complaints  -Continue methadone 10mg daily at 1500 and 5mg TID scheduled.   -Tylenol PRN  -Continue orthotic lymph  needs as directed and advised per Soham in preparation for prosthetic.   -Follow up with vascular as directed PRN. Prosthetic follow up as directed  -Aquaphor to thigh and stump BID  -Trend labs with PCP post TCU    (K59.01) Constipation  Comment: Acute on chronic. Reports harder stools. Declines offer for scheduling agents.   Plan:  -Miralax BID PRN  -Senna BID  PRN  -Monitor BM patterns    Discharge Medications:  MED REC REQUIRED  Post Medication Reconciliation Status:  Medication reconciliation previously completed during another office visit     Current Outpatient Medications   Medication Sig Dispense Refill     acetaminophen (TYLENOL) 325 MG tablet Take 2 tablets (650 mg) by mouth every 6 hours as needed for mild pain or other (and adjunct with moderate or severe pain or per patient request)       aspirin 81 MG EC tablet Take 1 tablet (81 mg) by mouth daily       atorvastatin (LIPITOR) 40 MG tablet Take 1 tablet (40 mg) by mouth every evening       insulin aspart (NOVOLOG FLEXPEN) 100 UNIT/ML pen Inject 3 Units Subcutaneous 3 times daily (with meals)       insulin degludec (TRESIBA) 200 UNIT/ML pen Inject 45 Units Subcutaneous at bedtime Hold for blood glucose less than 100       methadone (DOLOPHINE) 10 MG tablet Take 1 tablet (10 mg) by mouth daily Daily at 1500 30 tablet 0     methadone (DOLOPHINE) 5 MG tablet Take 1 tablet (5 mg) by mouth 3 times daily 90 tablet 0     metolazone (ZAROXOLYN) 2.5 MG tablet Take 1 tablet (2.5 mg) by mouth once a week Give on thursdays 4 tablet 2     mineral oil-hydrophilic petrolatum (AQUAPHOR) external ointment Apply to left thigh wound BID       multivitamin w/minerals (THERA-VIT-M) tablet Take 1 tablet by mouth daily       ondansetron (ZOFRAN ODT) 4 MG ODT tab Take 1 tablet (4 mg) by mouth every 6 hours as needed for nausea or vomiting       polyethylene glycol (MIRALAX) 17 GM/Dose powder Take 17 g by mouth 2 times daily as needed for constipation       senna-docusate (SENOKOT-S/PERICOLACE) 8.6-50 MG tablet Take 1 tablet by mouth 2 times daily as needed for constipation       torsemide (DEMADEX) 20 MG tablet Take 2 tablets (40 mg) by mouth daily HOLD if SBP<100 60 tablet 11     warfarin ANTICOAGULANT (COUMADIN) 2.5 MG tablet Take 2.5 mg by mouth five times a week Give 2.5 mg by mouth  one time a day every Tue, Wed, Fri, Sat, Sun for  "A-fib  until 03/31/2024 23:59 Give between 6048-2321       warfarin ANTICOAGULANT (COUMADIN) 3 MG tablet Take 3 mg by mouth twice a week Give 3 mg by mouth one time a  day every Mon, Thu for A-fib until 03/31/2024 23:59  Give between 4149-0178       Warfarin Therapy Reminder Per INR        Controlled medications:   Medication methadone electronically prescribed to [facility] pharmacy     Past Medical History:   Past Medical History:   Diagnosis Date     Depressive disorder, not elsewhere classified      Herpes zoster without mention of complication      Hypercalcemia 2/24/2007     Mild intermittent asthma      Other urinary incontinence      Type II or unspecified type diabetes mellitus with renal manifestations, uncontrolled(250.42) (H)      Type II or unspecified type diabetes mellitus without mention of complication, not stated as uncontrolled      Unspecified essential hypertension      Physical Exam:   Vitals: /70   Pulse 86   Temp 97.7  F (36.5  C)   Resp 16   Ht 1.676 m (5' 6\")   Wt 105.3 kg (232 lb 3.2 oz)   SpO2 99%   BMI 37.48 kg/m    BMI: Body mass index is 37.48 kg/m .  GENERAL APPEARANCE:  Alert, in no distress, oriented, cooperative  ENT:  Mouth and posterior oropharynx normal, moist mucous membranes, normal hearing acuity  EYES:  EOM, conjunctivae, lids, pupils and irises normal  NECK:  No adenopathy,masses or thyromegaly  RESP:  respiratory effort and palpation of chest normal, lungs clear to auscultation , no respiratory distress  CV:  Palpation and auscultation of heart done , regular rate and rhythm, no murmur, rub, or gallop, minimal edema to RLE. STump present to left  ABDOMEN:  normal bowel sounds, soft, nontender, no hepatosplenomegaly or other masses, no guarding or rebound  M/S:   Stand pivot. Ambulated 3 steps. Wheelchair for main mobility needs. Independent with cares  SKIN:  Inspection of skin and subcutaneous tissue baseline, Palpation of skin and subcutaneous tissue " baseline  NEURO:   Cranial nerves 2-12 are normal tested and grossly at patient's baseline, no purposeful movement in upper and lower extremities  PSYCH:  oriented X 3, normal insight, judgement and memory, affect and mood normal     SNF labs: Most Recent 3 CBC's:  Recent Labs   Lab Test 03/22/24  1357 03/01/24  1309 02/02/24  1043   WBC 8.4 7.0 9.2   HGB 11.5* 10.3* 10.9*   MCV 92 92 91    178 200     Most Recent 3 BMP's:  Recent Labs   Lab Test 03/22/24  1357 03/01/24  1319 02/16/24  1111    137 138   POTASSIUM 3.8 4.1 3.8   CHLORIDE 95* 92* 96*   CO2 38* 37* 31*   BUN 34.6* 34.8* 33.1*   CR 1.07* 1.05* 1.00*   ANIONGAP 9 8 11   ZAKIA 10.4* 10.3* 10.4*   * 368* 253*     Most Recent 2 LFT's:  Recent Labs   Lab Test 11/17/23  1033 11/01/23  0613   AST 20 19   ALT 11 12   ALKPHOS 90 97   BILITOTAL 0.5 0.2     Most Recent 3 INR's:  Recent Labs   Lab Test 03/01/24  1319 02/16/24  1111 01/02/24  0857   INR 1.64* 2.08* 1.47*     Most Recent Cholesterol Panel:  Recent Labs   Lab Test 09/30/23  1620   CHOL 82   LDL 37   HDL 35*   TRIG 49     Most Recent TSH and T4:  Recent Labs   Lab Test 11/24/23  1012   TSH 3.46   T4 1.20     Most Recent Hemoglobin A1c:  Recent Labs   Lab Test 03/01/24  1319   A1C 9.9*     Most Recent Anemia Panel:  Recent Labs   Lab Test 03/22/24  1357   WBC 8.4   HGB 11.5*   HCT 35.8   MCV 92          DISCHARGE PLAN:  Follow up labs: INR via fingerstick will be drawn on 4/1/24 with anticipation to follow up with coumadin clinic post discharge. Suspect will be due week of April 15, 2024  Medical Follow Up:      Follow up with primary care provider in 1-2 weeks  Follow up with specialist as directed   Current [place] scheduled appointments:  Appointments in Next Year      Discharge Services: Home Care:  Occupational Therapy, Physical Therapy, Registered Nurse, Home Health Aide, and lymphedema therapy  Discharge Instructions Verbalized to Patient at Discharge:   Weight bearing  restrictions:  Weight bearing as tolerated.   Monitor blood glucose monitoring 4 times a day. Keep a record and bring it to your next primary provider visit.   Continue to follow your diet:  regular.   Weigh yourself daily in the morning and keep a record. Call your primary clinic: a) if you are more short of breath, or b) if your weight changes more than 3 pounds in one day or more than 5 pounds in one week.   Incision may be kept open to air.     TOTAL DISCHARGE TIME:   Greater than 30 minutes  Electronically signed by:  Dr. Haylie Rowe DNP, APRN, FNP-C, WCS-C, EDS-C      Documentation of Face to Face and Certification for Home Health Services    I certify that patient: Linda Loredo is under my care and that I, or a nurse practitioner or physician's assistant working with me, had a face-to-face encounter that meets the physician face-to-face encounter requirements with this patient on: 3/29/2024.    This encounter with the patient was in whole, or in part, for the following medical condition, which is the primary reason for home health care: The primary encounter diagnosis was Type 2 diabetes mellitus with diabetic polyneuropathy, with long-term current use of insulin (H). Diagnoses of Morbid obesity (H), PAD (peripheral artery disease) (H24), 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), Paroxysmal atrial fibrillation (H), Venous stasis, Lymphedema, Stage 3a chronic kidney disease (H), Chronic right-sided heart failure (H), Opioid dependence, uncomplicated (H), Warfarin-induced coagulopathy  (H24), Long term (current) use of anticoagulants, Weight gain, Essential hypertension, Other chronic osteomyelitis of left foot (H), Open wound, Diabetic ulcer of left heel associated with type 2 diabetes mellitus, with necrosis of bone (H), Acute blood loss anemia, Dyslipidemia, Persistent atrial fibrillation (H), Severe obesity (BMI 35.0-39.9) with comorbidity (H), Slow transit  constipation, Hx of skin graft, Severe obesity (H), Status post debridement, Skin lesion, Drug-induced constipation, Primary hyperparathyroidism (H24), Physical deconditioning, Chronic obstructive pulmonary disease, unspecified COPD type (H), and Major depressive disorder, recurrent episode, moderate (H) were also pertinent to this visit..    I certify that, based on my findings, the following services are medically necessary home health services: Nursing, Occupational Therapy, Physical Therapy, and lymphedema .    My clinical findings support the need for the above services because: Nurse is needed: To assess medication management, education after changes in medications or other medical regimen.., Occupational Therapy Services are needed to assess and treat cognitive ability and address ADL safety due to impairment in deconditioning., and Physical Therapy Services are needed to assess and treat the following functional impairments: deconditioning/lypmhedema.    Further, I certify that my clinical findings support that this patient is homebound (i.e. absences from home require considerable and taxing effort and are for medical reasons or [employer] services or infrequently or of short duration when for other reasons) because: Requires assistance of another person or specialized equipment to access medical services because patient: Is unable to walk greater than 0 feet without rest. and Requires supervision of another for safe transfer...    Based on the above findings. I certify that this patient is confined to the home and needs intermittent skilled nursing care, physical therapy and/or speech therapy.  The patient is under my care, and I have initiated the establishment of the plan of care.  This patient will be followed by a physician who will periodically review the plan of care.  Physician/Provider to provide follow up care: Will be signing onto in house services in [facility] setting    Attending hospital physician  (the Medicare certified [facility] provider): Dr.Emily Joseph MD, signing F2F and only signing for initial order. Please send all follow up questions and concerns or needed follow up signatures to the PCP, who [place] has on file as:  Will be signing onto in house services in [place] setting    Physician Signature: See electronic signature associated with these discharge orders.  Date: 3/28/2024                Sincerely,        MOE Fitch CNP

## 2024-03-29 NOTE — PROGRESS NOTES
[facility] GERIATRICS DISCHARGE SUMMARY  PATIENT'S NAME: Linda Loredo  YOB: 1945  MEDICAL RECORD NUMBER:  [id number]  Place of Service where encounter took place:  [facility] ([place]) [[id number]]    PRIMARY CARE PROVIDER AND CLINIC RESPONSIBLE AFTER TRANSFER:   Will be signing onto in house services in [place] setting   Non-FMG Provider     Transferring providers: MOE Fitch CNP, Courtney Ruiz MD  Recent Hospitalization/ED:  [facility]  stay 10/24/23 to 11/15/23.  Date of SNF Admission:  11/15/24  Date of SNF (anticipated) Discharge:  4/2/24  Discharged to: new assisted living for patient tanya Kipling  Cognitive Scores: BIMS: 15/15  Physical Function: Wheelchair dependent and Pivot transfers  DME: DME F2F done     CODE STATUS/ADVANCE DIRECTIVES DISCUSSION:  Prior   ALLERGIES: Prednisone, Morphine, and Morphine [fumaric acid]    NURSING FACILITY COURSE   Medication Changes/Rationale:   See below    Summary of nursing facility stay:     (E11.621,  L97.509,  Z79.4) Type 2 diabetes mellitus with foot ulcer, with long-term current use of insulin (H)  (E66.01) Obesity, Class III, BMI 40-49.9 (morbid obesity) (H)  Comment: Chronic. Last A1c 9.9%. Goal <9%. Discussed history of being on metformin in the past. No longer on this and she does not know why. I suspect it may be due to GUSTAVO history. She reports being followed by endocrine and is wanting to see them again. Per last endo note from 6 months ago, patient was not on any mealtime insulin with recommendations to start ozempic once a week with continuing tresiba. Patient also reports wanting to restart her insulin pump as she reports having one <1 year ago as this was stopped by her previous PCP   Plan:   -Monitor Blood Glucose QID as directed  -Discontinued sliding scale coverage due to transitioning to [place] setting within the next couple weeks. Started  insulin aspart 3units TID with meals. HOLD if Blood Glucose<120.   -Continue tresiba 45 units at HS  -Advised her to schedule follow up with endocrinology and staff to assist with scheduling transportation. Scheduled for 4/1/24  -Trend labs with PCP post TCU  -Trend hgb A1c in 3 months. Due June 2024             (I48.0) Paroxysmal atrial fibrillation (H)  (Z79.01) Long term (current) use of anticoagulants  (D68.32,  T45.515A) Warfarin-induced coagulopathy   (I10) Essential hypertension with goal blood pressure less than 140/90  (E78.2) Mixed hyperlipidemia  (N18.30) Stage 3 chronic kidney disease, unspecified whether stage 3a or 3b CKD (H)  (I50.812) Chronic right-sided heart failure (H)  (R63.5) Weight gain  Comment: Chronic. Baseline Creatinine~ 1-1.2. Based on JNC-8 goals,  patients age of 78 year old, presence of diabetes or CKD, and goals of care goal BP is  <140/90 mm Hg. Patient is stable with current plan of care and routine assessment.. INR goal 2-3. Last INR via fingerstick was 1.9 on 3/8, INR today- 2.3 via fingerstick today. Trending weight gain present despite adjustment of torsemide dose. Attempted to transition to furosemide with no relief. She is now open to resuming metolazone once a week for now until weight stabilizes.   Plan:   -Monitor for worsening s/symptoms of concerns  -Monitor BP and HR as directed  -Continue asa 81mg daily  -Continue metolazone 2.5mg once a week on Thursdays for now.   -Continue atorvastatin 40mg daily  -Continue coumadin 3mg on Mon/Thurs and 2.5mg AOD.   -Monitor weights as directed daily. Baseline weight around 222-224# on admission but noted highest weight noted to be 242#. Weight today 232#  -Continue torsemide 40mg daily. HOLD if SBP<100  -Continue lymphedema therapy to bilateral lower extremities as directed.   -Would benefit from cardiology referral. Declines at this time as she believes she does not have CHF history.   -Trend labs with PCP post TCU  -INR due Monday  4/1/24 for discharge planning needs      (I89.0) Lymphedema  (I87.8) Venous stasis  Comment: Acute on chronic. Suspect edema may also be contributing to wound concerns to RLE. Seen by wound provider in clinic on 2/26/24. Areas RESOLVED.   Plan:  -Continue Lymphedema therapy to assess and assist with edema control MWF on site.   -Follow up with wound clinic as directed PRN  -Monitor RLE for worsening s/symptoms of concerns. No dressings needed at this time.   -Trend labs with PCP post TCU     (E11.621,  L97.425) Diabetic ulcer of left heel associated with type 2 diabetes mellitus, with muscle involvement without evidence of necrosis (H)  (Z89.512) 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)  (Z98.890) Status post debridement  (M86.9) Osteomyelitis of left foot, unspecified type (H)  (F11.20) Opioid dependence, uncomplicated (H)  (G89.4) Chronic pain syndrome  (I83.019,  I83.029,  L97.919,  L97.929) Venous stasis ulcers of both lower extremities (H)  (I89.0) Lymphedema  (Z94.5) History of skin graft placement on 1/2/24 as above  Comment: Chronic. S/p debridement 9/26/23. S/p Left guillotine BKA on 10/7/23. S/p tibial/fibular resection and debridement of stump 10/17/23. Followed by vascular. Completed course of meropenem/vancomycin 10/8 for osteomyelitis. Surgery completed 1/2/24 per review.   Plan:   -Monitor pain complaints  -Continue methadone 10mg daily at 1500 and 5mg TID scheduled.   -Tylenol PRN  -Continue orthotic lymph  needs as directed and advised per Soham in preparation for prosthetic.   -Follow up with vascular as directed PRN. Prosthetic follow up as directed  -Aquaphor to thigh and stump BID  -Trend labs with PCP post TCU    (K59.01) Constipation  Comment: Acute on chronic. Reports harder stools. Declines offer for scheduling agents.   Plan:  -Miralax BID PRN  -Senna BID PRN  -Monitor BM patterns    Discharge Medications:  MED REC REQUIRED  Post Medication Reconciliation  Status:  Medication reconciliation previously completed during another office visit     Current Outpatient Medications   Medication Sig Dispense Refill    acetaminophen (TYLENOL) 325 MG tablet Take 2 tablets (650 mg) by mouth every 6 hours as needed for mild pain or other (and adjunct with moderate or severe pain or per patient request)      aspirin 81 MG EC tablet Take 1 tablet (81 mg) by mouth daily      atorvastatin (LIPITOR) 40 MG tablet Take 1 tablet (40 mg) by mouth every evening      insulin aspart (NOVOLOG FLEXPEN) 100 UNIT/ML pen Inject 3 Units Subcutaneous 3 times daily (with meals)      insulin degludec (TRESIBA) 200 UNIT/ML pen Inject 45 Units Subcutaneous at bedtime Hold for blood glucose less than 100      methadone (DOLOPHINE) 10 MG tablet Take 1 tablet (10 mg) by mouth daily Daily at 1500 30 tablet 0    methadone (DOLOPHINE) 5 MG tablet Take 1 tablet (5 mg) by mouth 3 times daily 90 tablet 0    metolazone (ZAROXOLYN) 2.5 MG tablet Take 1 tablet (2.5 mg) by mouth once a week Give on thursdays 4 tablet 2    mineral oil-hydrophilic petrolatum (AQUAPHOR) external ointment Apply to left thigh wound BID      multivitamin w/minerals (THERA-VIT-M) tablet Take 1 tablet by mouth daily      ondansetron (ZOFRAN ODT) 4 MG ODT tab Take 1 tablet (4 mg) by mouth every 6 hours as needed for nausea or vomiting      polyethylene glycol (MIRALAX) 17 GM/Dose powder Take 17 g by mouth 2 times daily as needed for constipation      senna-docusate (SENOKOT-S/PERICOLACE) 8.6-50 MG tablet Take 1 tablet by mouth 2 times daily as needed for constipation      torsemide (DEMADEX) 20 MG tablet Take 2 tablets (40 mg) by mouth daily HOLD if SBP<100 60 tablet 11    warfarin ANTICOAGULANT (COUMADIN) 2.5 MG tablet Take 2.5 mg by mouth five times a week Give 2.5 mg by mouth  one time a day every Tue, Wed, Fri, Sat, Sun for A-fib  until 03/31/2024 23:59 Give between 5896-0590      warfarin ANTICOAGULANT (COUMADIN) 3 MG tablet Take 3 mg by  "mouth twice a week Give 3 mg by mouth one time a  day every Mon, Thu for A-fib until 03/31/2024 23:59  Give between 2071-3448      Warfarin Therapy Reminder Per INR        Controlled medications:   [facility]     Past Medical History:   Past Medical History:   Diagnosis Date    Depressive disorder, not elsewhere classified     Herpes zoster without mention of complication     Hypercalcemia 2/24/2007    Mild intermittent asthma     Other urinary incontinence     Type II or unspecified type diabetes mellitus with renal manifestations, uncontrolled(250.42) (H)     Type II or unspecified type diabetes mellitus without mention of complication, not stated as uncontrolled     Unspecified essential hypertension      Physical Exam:   Vitals: /70   Pulse 86   Temp 97.7  F (36.5  C)   Resp 16   Ht 1.676 m (5' 6\")   Wt 105.3 kg (232 lb 3.2 oz)   SpO2 99%   BMI 37.48 kg/m    BMI: Body mass index is 37.48 kg/m .  GENERAL APPEARANCE:  Alert, in no distress, oriented, cooperative  ENT:  Mouth and posterior oropharynx normal, moist mucous membranes, normal hearing acuity  EYES:  EOM, conjunctivae, lids, pupils and irises normal  NECK:  No adenopathy,masses or thyromegaly  RESP:  respiratory effort and palpation of chest normal, lungs clear to auscultation , no respiratory distress  CV:  Palpation and auscultation of heart done , regular rate and rhythm, no murmur, rub, or gallop, minimal edema to RLE. STump present to left  ABDOMEN:  normal bowel sounds, soft, nontender, no hepatosplenomegaly or other masses, no guarding or rebound  M/S:   Stand pivot. Ambulated 3 steps. Wheelchair for main mobility needs. Independent with cares  SKIN:  Inspection of skin and subcutaneous tissue baseline, Palpation of skin and subcutaneous tissue baseline  NEURO:   Cranial nerves 2-12 are normal tested and grossly at patient's baseline, no purposeful movement in upper and " lower extremities  PSYCH:  oriented X 3, normal insight, judgement and memory, affect and mood normal     SNF labs: Most Recent 3 CBC's:  Recent Labs   Lab Test 03/22/24  1357 03/01/24  1309 02/02/24  1043   WBC 8.4 7.0 9.2   HGB 11.5* 10.3* 10.9*   MCV 92 92 91    178 200     Most Recent 3 BMP's:  Recent Labs   Lab Test 03/22/24  1357 03/01/24  1319 02/16/24  1111    137 138   POTASSIUM 3.8 4.1 3.8   CHLORIDE 95* 92* 96*   CO2 38* 37* 31*   BUN 34.6* 34.8* 33.1*   CR 1.07* 1.05* 1.00*   ANIONGAP 9 8 11   ZAKIA 10.4* 10.3* 10.4*   * 368* 253*     Most Recent 2 LFT's:  Recent Labs   Lab Test 11/17/23  1033 11/01/23  0613   AST 20 19   ALT 11 12   ALKPHOS 90 97   BILITOTAL 0.5 0.2     Most Recent 3 INR's:  Recent Labs   Lab Test 03/01/24  1319 02/16/24  1111 01/02/24  0857   INR 1.64* 2.08* 1.47*     Most Recent Cholesterol Panel:  Recent Labs   Lab Test 09/30/23  1620   CHOL 82   LDL 37   HDL 35*   TRIG 49     Most Recent TSH and T4:  Recent Labs   Lab Test 11/24/23  1012   TSH 3.46   T4 1.20     Most Recent Hemoglobin A1c:  Recent Labs   Lab Test 03/01/24  1319   A1C 9.9*     Most Recent Anemia Panel:  Recent Labs   Lab Test 03/22/24  1357   WBC 8.4   HGB 11.5*   HCT 35.8   MCV 92          DISCHARGE PLAN:  Follow up labs: INR via fingerstick will be drawn on 4/1/24 with anticipation to follow up with coumadin clinic post discharge. Suspect will be due week of April 15, 2024  Medical Follow Up:      Follow up with primary care provider in 1-2 weeks  Follow up with specialist as directed   Current [place] scheduled appointments:  Appointments in Next Year      Discharge Services: Home Care:  Occupational Therapy, Physical Therapy, Registered Nurse, Home Health Aide, and lymphedema therapy  Discharge Instructions Verbalized to Patient at Discharge:   Weight bearing restrictions:  Weight bearing as tolerated.   Monitor blood glucose monitoring 4 times a day. Keep a record and bring it to your  next primary provider visit.   Continue to follow your diet:  regular.   Weigh yourself daily in the morning and keep a record. Call your primary clinic: a) if you are more short of breath, or b) if your weight changes more than 3 pounds in one day or more than 5 pounds in one week.   Incision may be kept open to air.     TOTAL DISCHARGE TIME:   Greater than 30 minutes  Electronically signed by:  Dr. Haylie Rowe DNP, APRN, FNP-C, WCS-C, EDS-C      Documentation of Face to Face and Certification for Home Health Services    I certify that patient: Linda Loredo is under my care and that I, or a nurse practitioner or physician's assistant working with me, had a face-to-face encounter that meets the physician face-to-face encounter requirements with this patient on: 3/29/2024.    This encounter with the patient was in whole, or in part, for the following medical condition, which is the primary reason for home health care: The primary encounter diagnosis was Type 2 diabetes mellitus with diabetic polyneuropathy, with long-term current use of insulin (H). Diagnoses of Morbid obesity (H), PAD (peripheral artery disease) (H24), 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), Paroxysmal atrial fibrillation (H), Venous stasis, Lymphedema, Stage 3a chronic kidney disease (H), Chronic right-sided heart failure (H), Opioid dependence, uncomplicated (H), Warfarin-induced coagulopathy  (H24), Long term (current) use of anticoagulants, Weight gain, Essential hypertension, Other chronic osteomyelitis of left foot (H), Open wound, Diabetic ulcer of left heel associated with type 2 diabetes mellitus, with necrosis of bone (H), Acute blood loss anemia, Dyslipidemia, Persistent atrial fibrillation (H), Severe obesity (BMI 35.0-39.9) with comorbidity (H), Slow transit constipation, Hx of skin graft, Severe obesity (H), Status post debridement, Skin lesion, Drug-induced constipation, Primary  hyperparathyroidism (H24), Physical deconditioning, Chronic obstructive pulmonary disease, unspecified COPD type (H), and Major depressive disorder, recurrent episode, moderate (H) were also pertinent to this visit..    I certify that, based on my findings, the following services are medically necessary home health services: Nursing, Occupational Therapy, Physical Therapy, and lymphedema .    My clinical findings support the need for the above services because: Nurse is needed: To assess medication management, education after changes in medications or other medical regimen.., Occupational Therapy Services are needed to assess and treat cognitive ability and address ADL safety due to impairment in deconditioning., and Physical Therapy Services are needed to assess and treat the following functional impairments: deconditioning/lypmhedema.    Further, I certify that my clinical findings support that this patient is homebound (i.e. absences from home require considerable and taxing effort and are for medical reasons or [facility] services or infrequently or of short duration when for other reasons) because: Requires assistance of another person or specialized equipment to access medical services because patient: Is unable to walk greater than 0 feet without rest. and Requires supervision of another for safe transfer...    Based on the above findings. I certify that this patient is confined to the home and needs intermittent skilled nursing care, physical therapy and/or speech therapy.  The patient is under my care, and I have initiated the establishment of the plan of care.  This patient will be followed by a physician who will periodically review the plan of care.  Physician/Provider to provide follow up care: Will be signing onto in house services in [place] setting    Attending hospital physician (the Medicare certified [facility]EL[facility] provider): Dr.Emily Joseph MD, signing F2F and only signing for initial order. Please send all  follow up questions and concerns or needed follow up signatures to the PCP, who Edith has on file as:  Will be signing onto in house services in [place] setting    Physician Signature: See electronic signature associated with these discharge orders.  Date: 3/28/2024

## 2024-03-31 NOTE — PROGRESS NOTES
"[facility] GERIATRICS  [place] Medical Record Number:  [id number]  Place of Service where encounter took place: [facility] ([place]) [[id number]]    HPI:    Linda Loredo is a 79 year old  (1945), who is being seen today for an episodic care visit at the above location. Today's concern is INR/Coumadin management for NICO Rolle    ROS/Subjective:  Bleeding Signs/Symptoms:  None  Thromboembolic Signs/Symptoms:  None  Medication Changes:  No  Dietary Changes:  No  Activity Changes: No  Bacterial/Viral Infection:  No  Missed Coumadin Doses:  None  On ASA: Yes  Other Concerns:  No    OBJECTIVE:  /69   Pulse 94   Temp 98.6  F (37  C)   Resp 17   Ht 1.676 m (5' 6\")   Wt 104.4 kg (230 lb 3.2 oz)   SpO2 100%   BMI 37.16 kg/m    Last INR: 1.9 on 3/8/24  INR Today:  2.4 via fingerstick  Current Dose:  3mg Mon/Thurs and 2.5mg AOD    ASSESSMENT:     Paroxysmal atrial fibrillation (H)  Warfarin-induced coagulopathy (H24)  Long term (current) use of anticoagulants  Encounter for therapeutic drug monitoring  Subtherapeutic INR for goal of 2-3    PLAN/ORDERS:     New Dose: No Change    Next INR: 2 weeks 4/15/24 with home care staff to be doses by PCP at new setting    Electronically signed by:  Dr. Haylie Rowe DNP, APRN, FNP-C, WCS-C, EDS-C      "

## 2024-04-01 ENCOUNTER — NURSING HOME VISIT (OUTPATIENT)
Dept: GERIATRICS | Facility: CLINIC | Age: 79
End: 2024-04-01
Payer: COMMERCIAL

## 2024-04-01 VITALS
TEMPERATURE: 98.6 F | HEIGHT: 66 IN | RESPIRATION RATE: 17 BRPM | SYSTOLIC BLOOD PRESSURE: 110 MMHG | BODY MASS INDEX: 37 KG/M2 | DIASTOLIC BLOOD PRESSURE: 69 MMHG | OXYGEN SATURATION: 100 % | WEIGHT: 230.2 LBS | HEART RATE: 94 BPM

## 2024-04-01 DIAGNOSIS — D68.32 WARFARIN-INDUCED COAGULOPATHY (H): ICD-10-CM

## 2024-04-01 DIAGNOSIS — Z79.01 LONG TERM (CURRENT) USE OF ANTICOAGULANTS: ICD-10-CM

## 2024-04-01 DIAGNOSIS — T45.515A WARFARIN-INDUCED COAGULOPATHY (H): ICD-10-CM

## 2024-04-01 DIAGNOSIS — Z51.81 ENCOUNTER FOR THERAPEUTIC DRUG MONITORING: ICD-10-CM

## 2024-04-01 DIAGNOSIS — I48.0 PAROXYSMAL ATRIAL FIBRILLATION (H): Primary | ICD-10-CM

## 2024-04-01 PROCEDURE — 99308 SBSQ NF CARE LOW MDM 20: CPT | Performed by: NURSE PRACTITIONER

## 2024-04-01 RX ORDER — WARFARIN SODIUM 1 MG/1
TABLET ORAL
Qty: 90 TABLET | Refills: 0 | Status: SHIPPED | OUTPATIENT
Start: 2024-04-01 | End: 2024-06-06

## 2024-04-01 NOTE — LETTER
"    4/1/2024        RE: Linda Loredo  [address] [address] [address]        [facility]  [place] Medical Record Number:  [id number]  Place of Service where encounter took place: [facility] ([facility] ) ([place]) [[id number]]    HPI:    Linda Loredo is a 79 year old  (1945), who is being seen today for an episodic care visit at the above location. Today's concern is INR/Coumadin management for NICO Fib    ROS/Subjective:  Bleeding Signs/Symptoms:  None  Thromboembolic Signs/Symptoms:  None  Medication Changes:  No  Dietary Changes:  No  Activity Changes: No  Bacterial/Viral Infection:  No  Missed Coumadin Doses:  None  On ASA: Yes  Other Concerns:  No    OBJECTIVE:  /69   Pulse 94   Temp 98.6  F (37  C)   Resp 17   Ht 1.676 m (5' 6\")   Wt 104.4 kg (230 lb 3.2 oz)   SpO2 100%   BMI 37.16 kg/m    Last INR: 1.9 on 3/8/24  INR Today:  2.4 via fingerstick  Current Dose:  3mg Mon/Thurs and 2.5mg AOD    ASSESSMENT:     Paroxysmal atrial fibrillation (H)  Warfarin-induced coagulopathy (H24)  Long term (current) use of anticoagulants  Encounter for therapeutic drug monitoring  Subtherapeutic INR for goal of 2-3    PLAN/ORDERS:     New Dose: No Change    Next INR: 2 weeks 4/15/24 with home care staff to be doses by PCP at new setting    Electronically signed by:  Dr. Haylie Rowe DNP, APRN, FNP-C, WCS-C, EDS-C        Sincerely,        Haylie Rowe, MOE CNP      "

## 2024-04-03 DIAGNOSIS — F11.20 OPIOID DEPENDENCE, UNCOMPLICATED (H): ICD-10-CM

## 2024-04-03 RX ORDER — METHADONE HYDROCHLORIDE 5 MG/1
5 TABLET ORAL 3 TIMES DAILY
Qty: 90 TABLET | Refills: 0 | Status: ON HOLD | OUTPATIENT
Start: 2024-04-03 | End: 2024-06-10

## 2024-04-03 RX ORDER — METHADONE HYDROCHLORIDE 10 MG/1
10 TABLET ORAL DAILY
Qty: 30 TABLET | Refills: 0 | Status: SHIPPED | OUTPATIENT
Start: 2024-04-03 | End: 2024-06-06

## 2024-04-14 ENCOUNTER — LAB REQUISITION (OUTPATIENT)
Dept: LAB | Facility: CLINIC | Age: 79
End: 2024-04-14
Payer: COMMERCIAL

## 2024-04-14 DIAGNOSIS — I48.20 CHRONIC ATRIAL FIBRILLATION, UNSPECIFIED (H): ICD-10-CM

## 2024-04-15 ENCOUNTER — HOSPITAL ENCOUNTER (OUTPATIENT)
Facility: CLINIC | Age: 79
Discharge: HOME | End: 2024-04-15
Admitting: PHYSICIAN ASSISTANT
Payer: COMMERCIAL

## 2024-04-15 LAB — INR PPP: 2.27 (ref 0.85–1.15)

## 2024-04-15 PROCEDURE — 85610 PROTHROMBIN TIME: CPT | Mod: ORL | Performed by: PHYSICIAN ASSISTANT

## 2024-04-15 PROCEDURE — P9603 ONE-WAY ALLOW PRORATED MILES: HCPCS | Mod: ORL | Performed by: PHYSICIAN ASSISTANT

## 2024-04-15 PROCEDURE — 36415 COLL VENOUS BLD VENIPUNCTURE: CPT | Mod: ORL | Performed by: PHYSICIAN ASSISTANT

## 2024-04-28 ENCOUNTER — LAB REQUISITION (OUTPATIENT)
Dept: LAB | Facility: CLINIC | Age: 79
End: 2024-04-28
Payer: COMMERCIAL

## 2024-04-28 DIAGNOSIS — I48.20 CHRONIC ATRIAL FIBRILLATION, UNSPECIFIED (H): ICD-10-CM

## 2024-04-29 LAB — INR PPP: 1.67 (ref 0.85–1.15)

## 2024-04-29 PROCEDURE — 85610 PROTHROMBIN TIME: CPT | Mod: ORL | Performed by: PHYSICIAN ASSISTANT

## 2024-04-29 PROCEDURE — 36415 COLL VENOUS BLD VENIPUNCTURE: CPT | Mod: ORL | Performed by: PHYSICIAN ASSISTANT

## 2024-04-29 PROCEDURE — P9603 ONE-WAY ALLOW PRORATED MILES: HCPCS | Mod: ORL | Performed by: PHYSICIAN ASSISTANT

## 2024-05-05 ENCOUNTER — LAB REQUISITION (OUTPATIENT)
Dept: LAB | Facility: CLINIC | Age: 79
End: 2024-05-05
Payer: COMMERCIAL

## 2024-05-05 DIAGNOSIS — I48.20 CHRONIC ATRIAL FIBRILLATION, UNSPECIFIED (H): ICD-10-CM

## 2024-05-06 LAB — INR PPP: 1.79 (ref 0.85–1.15)

## 2024-05-06 PROCEDURE — 85610 PROTHROMBIN TIME: CPT | Mod: ORL | Performed by: PHYSICIAN ASSISTANT

## 2024-05-06 PROCEDURE — P9603 ONE-WAY ALLOW PRORATED MILES: HCPCS | Mod: ORL | Performed by: PHYSICIAN ASSISTANT

## 2024-05-06 PROCEDURE — 36415 COLL VENOUS BLD VENIPUNCTURE: CPT | Mod: ORL | Performed by: PHYSICIAN ASSISTANT

## 2024-05-10 ENCOUNTER — MEDICAL CORRESPONDENCE (OUTPATIENT)
Dept: HEALTH INFORMATION MANAGEMENT | Facility: CLINIC | Age: 79
End: 2024-05-10

## 2024-05-12 ENCOUNTER — LAB REQUISITION (OUTPATIENT)
Dept: LAB | Facility: CLINIC | Age: 79
End: 2024-05-12
Payer: COMMERCIAL

## 2024-05-12 DIAGNOSIS — I48.20 CHRONIC ATRIAL FIBRILLATION, UNSPECIFIED (H): ICD-10-CM

## 2024-05-14 ENCOUNTER — LAB REQUISITION (OUTPATIENT)
Dept: LAB | Facility: CLINIC | Age: 79
End: 2024-05-14
Payer: COMMERCIAL

## 2024-05-14 DIAGNOSIS — I48.20 CHRONIC ATRIAL FIBRILLATION, UNSPECIFIED (H): ICD-10-CM

## 2024-05-15 LAB — INR PPP: 2.61 (ref 0.85–1.15)

## 2024-05-15 PROCEDURE — P9603 ONE-WAY ALLOW PRORATED MILES: HCPCS | Mod: ORL | Performed by: PHYSICIAN ASSISTANT

## 2024-05-15 PROCEDURE — 85610 PROTHROMBIN TIME: CPT | Mod: ORL | Performed by: PHYSICIAN ASSISTANT

## 2024-05-28 ENCOUNTER — LAB REQUISITION (OUTPATIENT)
Dept: LAB | Facility: CLINIC | Age: 79
End: 2024-05-28
Payer: COMMERCIAL

## 2024-05-28 DIAGNOSIS — I48.20 CHRONIC ATRIAL FIBRILLATION, UNSPECIFIED (H): ICD-10-CM

## 2024-05-29 LAB — INR PPP: 2.05 (ref 0.85–1.15)

## 2024-05-29 PROCEDURE — 36415 COLL VENOUS BLD VENIPUNCTURE: CPT | Mod: ORL | Performed by: PHYSICIAN ASSISTANT

## 2024-05-29 PROCEDURE — 85610 PROTHROMBIN TIME: CPT | Mod: ORL | Performed by: PHYSICIAN ASSISTANT

## 2024-05-29 PROCEDURE — P9603 ONE-WAY ALLOW PRORATED MILES: HCPCS | Mod: ORL | Performed by: PHYSICIAN ASSISTANT

## 2024-06-06 ENCOUNTER — HOSPITAL ENCOUNTER (INPATIENT)
Facility: CLINIC | Age: 79
LOS: 4 days | Discharge: NURSING, REHAB OR LONG-TERM CARE | DRG: 872 | End: 2024-06-10
Attending: FAMILY MEDICINE | Admitting: FAMILY MEDICINE
Payer: COMMERCIAL

## 2024-06-06 DIAGNOSIS — I89.0 LYMPHEDEMA: ICD-10-CM

## 2024-06-06 DIAGNOSIS — R52 PAIN: ICD-10-CM

## 2024-06-06 DIAGNOSIS — I10 ESSENTIAL HYPERTENSION: ICD-10-CM

## 2024-06-06 DIAGNOSIS — E11.42 TYPE 2 DIABETES MELLITUS WITH DIABETIC POLYNEUROPATHY, WITH LONG-TERM CURRENT USE OF INSULIN (H): ICD-10-CM

## 2024-06-06 DIAGNOSIS — R50.9 FEVER OF UNKNOWN ORIGIN: ICD-10-CM

## 2024-06-06 DIAGNOSIS — B35.4 TINEA CORPORIS: Primary | ICD-10-CM

## 2024-06-06 DIAGNOSIS — G47.00 INSOMNIA, UNSPECIFIED TYPE: ICD-10-CM

## 2024-06-06 DIAGNOSIS — L03.115 CELLULITIS OF RIGHT LOWER EXTREMITY: ICD-10-CM

## 2024-06-06 DIAGNOSIS — Z79.4 TYPE 2 DIABETES MELLITUS WITH DIABETIC POLYNEUROPATHY, WITH LONG-TERM CURRENT USE OF INSULIN (H): ICD-10-CM

## 2024-06-06 DIAGNOSIS — F11.20 OPIOID DEPENDENCE, UNCOMPLICATED (H): ICD-10-CM

## 2024-06-06 PROBLEM — E11.621 DIABETIC FOOT ULCER (H): Status: ACTIVE | Noted: 2023-09-29

## 2024-06-06 PROBLEM — I50.812 CHRONIC RIGHT-SIDED HEART FAILURE (H): Status: ACTIVE | Noted: 2023-11-16

## 2024-06-06 PROBLEM — Z79.01 ANTICOAGULATION MONITORING, INR RANGE 2-3: Status: ACTIVE | Noted: 2022-07-06

## 2024-06-06 PROBLEM — Z86.79 HISTORY OF ATRIAL FIBRILLATION: Status: ACTIVE | Noted: 2023-09-22

## 2024-06-06 PROBLEM — L97.509 DIABETIC FOOT ULCER (H): Status: ACTIVE | Noted: 2023-09-29

## 2024-06-06 LAB
ALBUMIN SERPL BCG-MCNC: 3.6 G/DL (ref 3.5–5.2)
ALBUMIN UR-MCNC: 100 MG/DL
ALP SERPL-CCNC: 123 U/L (ref 40–150)
ALT SERPL W P-5'-P-CCNC: 24 U/L (ref 0–50)
ANION GAP SERPL CALCULATED.3IONS-SCNC: 15 MMOL/L (ref 7–15)
APPEARANCE UR: ABNORMAL
AST SERPL W P-5'-P-CCNC: 30 U/L (ref 0–45)
BACTERIA #/AREA URNS HPF: ABNORMAL /HPF
BASOPHILS # BLD AUTO: 0.1 10E3/UL (ref 0–0.2)
BASOPHILS NFR BLD AUTO: 0 %
BILIRUB SERPL-MCNC: 0.7 MG/DL
BILIRUB UR QL STRIP: NEGATIVE
BUN SERPL-MCNC: 41.9 MG/DL (ref 8–23)
CALCIUM SERPL-MCNC: 10.2 MG/DL (ref 8.8–10.2)
CHLORIDE SERPL-SCNC: 93 MMOL/L (ref 98–107)
COLOR UR AUTO: YELLOW
CREAT SERPL-MCNC: 1.09 MG/DL (ref 0.51–0.95)
CRP SERPL-MCNC: 89.75 MG/L
DEPRECATED HCO3 PLAS-SCNC: 32 MMOL/L (ref 22–29)
EGFRCR SERPLBLD CKD-EPI 2021: 51 ML/MIN/1.73M2
EOSINOPHIL # BLD AUTO: 0.1 10E3/UL (ref 0–0.7)
EOSINOPHIL NFR BLD AUTO: 0 %
ERYTHROCYTE [DISTWIDTH] IN BLOOD BY AUTOMATED COUNT: 14.6 % (ref 10–15)
GLUCOSE BLDC GLUCOMTR-MCNC: 140 MG/DL (ref 70–99)
GLUCOSE BLDC GLUCOMTR-MCNC: 163 MG/DL (ref 70–99)
GLUCOSE SERPL-MCNC: 285 MG/DL (ref 70–99)
GLUCOSE UR STRIP-MCNC: NEGATIVE MG/DL
HCT VFR BLD AUTO: 38.1 % (ref 35–47)
HGB BLD-MCNC: 12.5 G/DL (ref 11.7–15.7)
HGB UR QL STRIP: ABNORMAL
HYALINE CASTS: 2 /LPF
IMM GRANULOCYTES # BLD: 0.1 10E3/UL
IMM GRANULOCYTES NFR BLD: 1 %
INR PPP: 3.33 (ref 0.85–1.15)
KETONES UR STRIP-MCNC: NEGATIVE MG/DL
LACTATE SERPL-SCNC: 2.4 MMOL/L (ref 0.7–2)
LACTATE SERPL-SCNC: 3.9 MMOL/L (ref 0.7–2)
LACTATE SERPL-SCNC: 4.2 MMOL/L (ref 0.7–2)
LEUKOCYTE ESTERASE UR QL STRIP: ABNORMAL
LYMPHOCYTES # BLD AUTO: 0.3 10E3/UL (ref 0.8–5.3)
LYMPHOCYTES NFR BLD AUTO: 2 %
MCH RBC QN AUTO: 30.9 PG (ref 26.5–33)
MCHC RBC AUTO-ENTMCNC: 32.8 G/DL (ref 31.5–36.5)
MCV RBC AUTO: 94 FL (ref 78–100)
MONOCYTES # BLD AUTO: 0.2 10E3/UL (ref 0–1.3)
MONOCYTES NFR BLD AUTO: 1 %
MUCOUS THREADS #/AREA URNS LPF: PRESENT /LPF
NEUTROPHILS # BLD AUTO: 18.8 10E3/UL (ref 1.6–8.3)
NEUTROPHILS NFR BLD AUTO: 96 %
NITRATE UR QL: NEGATIVE
NRBC # BLD AUTO: 0 10E3/UL
NRBC BLD AUTO-RTO: 0 /100
PH UR STRIP: 9 [PH] (ref 5–7)
PLATELET # BLD AUTO: 208 10E3/UL (ref 150–450)
POTASSIUM SERPL-SCNC: 3.8 MMOL/L (ref 3.4–5.3)
PROT SERPL-MCNC: 8.1 G/DL (ref 6.4–8.3)
RBC # BLD AUTO: 4.04 10E6/UL (ref 3.8–5.2)
RBC URINE: >182 /HPF
SODIUM SERPL-SCNC: 140 MMOL/L (ref 135–145)
SP GR UR STRIP: 1.01 (ref 1–1.03)
UROBILINOGEN UR STRIP-MCNC: NORMAL MG/DL
WBC # BLD AUTO: 19.5 10E3/UL (ref 4–11)
WBC URINE: 55 /HPF

## 2024-06-06 PROCEDURE — 258N000003 HC RX IP 258 OP 636: Mod: JZ | Performed by: FAMILY MEDICINE

## 2024-06-06 PROCEDURE — 250N000012 HC RX MED GY IP 250 OP 636 PS 637

## 2024-06-06 PROCEDURE — 83605 ASSAY OF LACTIC ACID: CPT | Performed by: FAMILY MEDICINE

## 2024-06-06 PROCEDURE — 85610 PROTHROMBIN TIME: CPT | Performed by: FAMILY MEDICINE

## 2024-06-06 PROCEDURE — 99285 EMERGENCY DEPT VISIT HI MDM: CPT | Performed by: FAMILY MEDICINE

## 2024-06-06 PROCEDURE — 83605 ASSAY OF LACTIC ACID: CPT

## 2024-06-06 PROCEDURE — 99291 CRITICAL CARE FIRST HOUR: CPT | Mod: 25 | Performed by: FAMILY MEDICINE

## 2024-06-06 PROCEDURE — 36415 COLL VENOUS BLD VENIPUNCTURE: CPT

## 2024-06-06 PROCEDURE — 81003 URINALYSIS AUTO W/O SCOPE: CPT | Performed by: FAMILY MEDICINE

## 2024-06-06 PROCEDURE — 250N000011 HC RX IP 250 OP 636: Mod: JZ | Performed by: FAMILY MEDICINE

## 2024-06-06 PROCEDURE — 87186 SC STD MICRODIL/AGAR DIL: CPT | Performed by: FAMILY MEDICINE

## 2024-06-06 PROCEDURE — 87149 DNA/RNA DIRECT PROBE: CPT | Performed by: FAMILY MEDICINE

## 2024-06-06 PROCEDURE — 99207 PR APP CREDIT; MD BILLING SHARED VISIT: CPT | Mod: FS

## 2024-06-06 PROCEDURE — 258N000003 HC RX IP 258 OP 636: Mod: JZ

## 2024-06-06 PROCEDURE — 87086 URINE CULTURE/COLONY COUNT: CPT | Performed by: FAMILY MEDICINE

## 2024-06-06 PROCEDURE — 36415 COLL VENOUS BLD VENIPUNCTURE: CPT | Performed by: FAMILY MEDICINE

## 2024-06-06 PROCEDURE — 99222 1ST HOSP IP/OBS MODERATE 55: CPT | Mod: AI | Performed by: FAMILY MEDICINE

## 2024-06-06 PROCEDURE — 85025 COMPLETE CBC W/AUTO DIFF WBC: CPT | Performed by: FAMILY MEDICINE

## 2024-06-06 PROCEDURE — 96365 THER/PROPH/DIAG IV INF INIT: CPT | Performed by: FAMILY MEDICINE

## 2024-06-06 PROCEDURE — 120N000001 HC R&B MED SURG/OB

## 2024-06-06 PROCEDURE — 250N000013 HC RX MED GY IP 250 OP 250 PS 637

## 2024-06-06 PROCEDURE — 82374 ASSAY BLOOD CARBON DIOXIDE: CPT | Performed by: FAMILY MEDICINE

## 2024-06-06 PROCEDURE — 86140 C-REACTIVE PROTEIN: CPT | Performed by: FAMILY MEDICINE

## 2024-06-06 PROCEDURE — 250N000011 HC RX IP 250 OP 636: Mod: JZ

## 2024-06-06 RX ORDER — LIDOCAINE 40 MG/G
CREAM TOPICAL
Status: DISCONTINUED | OUTPATIENT
Start: 2024-06-06 | End: 2024-06-10 | Stop reason: HOSPADM

## 2024-06-06 RX ORDER — SEMAGLUTIDE 0.68 MG/ML
0.5 INJECTION, SOLUTION SUBCUTANEOUS
Status: ON HOLD | COMMUNITY
Start: 2024-04-03 | End: 2024-06-10

## 2024-06-06 RX ORDER — MULTIVIT WITH MINERALS/LUTEIN
1 TABLET ORAL DAILY
Status: ON HOLD | COMMUNITY
End: 2024-07-11

## 2024-06-06 RX ORDER — PIPERACILLIN SODIUM, TAZOBACTAM SODIUM 3; .375 G/15ML; G/15ML
3.38 INJECTION, POWDER, LYOPHILIZED, FOR SOLUTION INTRAVENOUS ONCE
Status: COMPLETED | OUTPATIENT
Start: 2024-06-06 | End: 2024-06-06

## 2024-06-06 RX ORDER — DEXTROSE MONOHYDRATE 25 G/50ML
25-50 INJECTION, SOLUTION INTRAVENOUS
Status: DISCONTINUED | OUTPATIENT
Start: 2024-06-06 | End: 2024-06-10 | Stop reason: HOSPADM

## 2024-06-06 RX ORDER — CALCIUM CARBONATE 500 MG/1
1000 TABLET, CHEWABLE ORAL 4 TIMES DAILY PRN
Status: DISCONTINUED | OUTPATIENT
Start: 2024-06-06 | End: 2024-06-10 | Stop reason: HOSPADM

## 2024-06-06 RX ORDER — ONDANSETRON 2 MG/ML
4 INJECTION INTRAMUSCULAR; INTRAVENOUS EVERY 6 HOURS PRN
Status: DISCONTINUED | OUTPATIENT
Start: 2024-06-06 | End: 2024-06-10 | Stop reason: HOSPADM

## 2024-06-06 RX ORDER — ONDANSETRON 4 MG/1
4 TABLET, ORALLY DISINTEGRATING ORAL EVERY 6 HOURS PRN
Status: DISCONTINUED | OUTPATIENT
Start: 2024-06-06 | End: 2024-06-10 | Stop reason: HOSPADM

## 2024-06-06 RX ORDER — ACETAMINOPHEN 325 MG/1
650 TABLET ORAL EVERY 4 HOURS PRN
Status: DISCONTINUED | OUTPATIENT
Start: 2024-06-06 | End: 2024-06-10 | Stop reason: HOSPADM

## 2024-06-06 RX ORDER — HYDROMORPHONE HYDROCHLORIDE 1 MG/ML
.3-.5 INJECTION, SOLUTION INTRAMUSCULAR; INTRAVENOUS; SUBCUTANEOUS
Status: CANCELLED | OUTPATIENT
Start: 2024-06-06

## 2024-06-06 RX ORDER — LORAZEPAM 0.5 MG/1
0.5 TABLET ORAL EVERY 4 HOURS PRN
Status: DISCONTINUED | OUTPATIENT
Start: 2024-06-06 | End: 2024-06-10 | Stop reason: HOSPADM

## 2024-06-06 RX ORDER — PIPERACILLIN SODIUM, TAZOBACTAM SODIUM 4; .5 G/20ML; G/20ML
4.5 INJECTION, POWDER, LYOPHILIZED, FOR SOLUTION INTRAVENOUS EVERY 6 HOURS
Status: DISCONTINUED | OUTPATIENT
Start: 2024-06-06 | End: 2024-06-08

## 2024-06-06 RX ORDER — NALOXONE HYDROCHLORIDE 0.4 MG/ML
0.2 INJECTION, SOLUTION INTRAMUSCULAR; INTRAVENOUS; SUBCUTANEOUS
Status: DISCONTINUED | OUTPATIENT
Start: 2024-06-06 | End: 2024-06-10 | Stop reason: HOSPADM

## 2024-06-06 RX ORDER — NALOXONE HYDROCHLORIDE 0.4 MG/ML
0.4 INJECTION, SOLUTION INTRAMUSCULAR; INTRAVENOUS; SUBCUTANEOUS
Status: DISCONTINUED | OUTPATIENT
Start: 2024-06-06 | End: 2024-06-10 | Stop reason: HOSPADM

## 2024-06-06 RX ORDER — NICOTINE POLACRILEX 4 MG
15-30 LOZENGE BUCCAL
Status: DISCONTINUED | OUTPATIENT
Start: 2024-06-06 | End: 2024-06-10 | Stop reason: HOSPADM

## 2024-06-06 RX ORDER — SODIUM CHLORIDE, SODIUM LACTATE, POTASSIUM CHLORIDE, CALCIUM CHLORIDE 600; 310; 30; 20 MG/100ML; MG/100ML; MG/100ML; MG/100ML
INJECTION, SOLUTION INTRAVENOUS CONTINUOUS
Status: DISCONTINUED | OUTPATIENT
Start: 2024-06-06 | End: 2024-06-07

## 2024-06-06 RX ORDER — TORSEMIDE 20 MG/1
60 TABLET ORAL DAILY
Status: DISCONTINUED | OUTPATIENT
Start: 2024-06-07 | End: 2024-06-10 | Stop reason: HOSPADM

## 2024-06-06 RX ORDER — CEFAZOLIN SODIUM 1 G/50ML
1250 SOLUTION INTRAVENOUS ONCE
Status: COMPLETED | OUTPATIENT
Start: 2024-06-06 | End: 2024-06-06

## 2024-06-06 RX ORDER — ONDANSETRON 4 MG/1
4 TABLET, ORALLY DISINTEGRATING ORAL EVERY 6 HOURS PRN
Status: CANCELLED | OUTPATIENT
Start: 2024-06-06

## 2024-06-06 RX ORDER — DEXTROSE MONOHYDRATE, SODIUM CHLORIDE, AND POTASSIUM CHLORIDE 50; 1.49; 4.5 G/1000ML; G/1000ML; G/1000ML
INJECTION, SOLUTION INTRAVENOUS CONTINUOUS
Status: CANCELLED | OUTPATIENT
Start: 2024-06-06

## 2024-06-06 RX ORDER — LORAZEPAM 2 MG/ML
0.5 INJECTION INTRAMUSCULAR EVERY 4 HOURS PRN
Status: DISCONTINUED | OUTPATIENT
Start: 2024-06-06 | End: 2024-06-10 | Stop reason: HOSPADM

## 2024-06-06 RX ORDER — ATORVASTATIN CALCIUM 20 MG/1
40 TABLET, FILM COATED ORAL EVERY EVENING
Status: DISCONTINUED | OUTPATIENT
Start: 2024-06-06 | End: 2024-06-10 | Stop reason: HOSPADM

## 2024-06-06 RX ORDER — ASPIRIN 81 MG/1
81 TABLET ORAL DAILY
Status: DISCONTINUED | OUTPATIENT
Start: 2024-06-07 | End: 2024-06-10 | Stop reason: HOSPADM

## 2024-06-06 RX ORDER — METHADONE HYDROCHLORIDE 5 MG/1
5 TABLET ORAL 4 TIMES DAILY
Status: DISCONTINUED | OUTPATIENT
Start: 2024-06-06 | End: 2024-06-10 | Stop reason: HOSPADM

## 2024-06-06 RX ORDER — AMOXICILLIN 250 MG
1 CAPSULE ORAL 2 TIMES DAILY PRN
Status: DISCONTINUED | OUTPATIENT
Start: 2024-06-06 | End: 2024-06-10 | Stop reason: HOSPADM

## 2024-06-06 RX ORDER — METOLAZONE 2.5 MG/1
2.5 TABLET ORAL WEEKLY
Status: DISCONTINUED | OUTPATIENT
Start: 2024-06-06 | End: 2024-06-10 | Stop reason: HOSPADM

## 2024-06-06 RX ORDER — WARFARIN SODIUM 3 MG/1
1 TABLET ORAL DAILY
COMMUNITY
Start: 2024-05-29 | End: 2024-06-12 | Stop reason: DRUGHIGH

## 2024-06-06 RX ORDER — AMOXICILLIN 250 MG
2 CAPSULE ORAL 2 TIMES DAILY PRN
Status: DISCONTINUED | OUTPATIENT
Start: 2024-06-06 | End: 2024-06-10 | Stop reason: HOSPADM

## 2024-06-06 RX ORDER — ONDANSETRON 2 MG/ML
4 INJECTION INTRAMUSCULAR; INTRAVENOUS EVERY 6 HOURS PRN
Status: CANCELLED | OUTPATIENT
Start: 2024-06-06

## 2024-06-06 RX ORDER — OXYCODONE HYDROCHLORIDE 5 MG/1
5 TABLET ORAL EVERY 4 HOURS PRN
Status: DISCONTINUED | OUTPATIENT
Start: 2024-06-06 | End: 2024-06-10 | Stop reason: HOSPADM

## 2024-06-06 RX ADMIN — PIPERACILLIN AND TAZOBACTAM 4.5 G: 4; .5 INJECTION, POWDER, FOR SOLUTION INTRAVENOUS at 17:49

## 2024-06-06 RX ADMIN — PIPERACILLIN AND TAZOBACTAM 3.38 G: 3; .375 INJECTION, POWDER, FOR SOLUTION INTRAVENOUS at 12:46

## 2024-06-06 RX ADMIN — SODIUM CHLORIDE 1250 MG: 9 INJECTION, SOLUTION INTRAVENOUS at 14:14

## 2024-06-06 RX ADMIN — SODIUM CHLORIDE, POTASSIUM CHLORIDE, SODIUM LACTATE AND CALCIUM CHLORIDE: 600; 310; 30; 20 INJECTION, SOLUTION INTRAVENOUS at 18:24

## 2024-06-06 RX ADMIN — INSULIN ASPART 1 UNITS: 100 INJECTION, SOLUTION INTRAVENOUS; SUBCUTANEOUS at 18:29

## 2024-06-06 RX ADMIN — SODIUM CHLORIDE, POTASSIUM CHLORIDE, SODIUM LACTATE AND CALCIUM CHLORIDE 1000 ML: 600; 310; 30; 20 INJECTION, SOLUTION INTRAVENOUS at 15:14

## 2024-06-06 RX ADMIN — SODIUM CHLORIDE 1000 ML: 9 INJECTION, SOLUTION INTRAVENOUS at 13:30

## 2024-06-06 RX ADMIN — PIPERACILLIN AND TAZOBACTAM 4.5 G: 4; .5 INJECTION, POWDER, FOR SOLUTION INTRAVENOUS at 23:36

## 2024-06-06 RX ADMIN — SODIUM CHLORIDE 500 ML: 9 INJECTION, SOLUTION INTRAVENOUS at 14:29

## 2024-06-06 RX ADMIN — METHADONE HYDROCHLORIDE 5 MG: 5 TABLET ORAL at 19:48

## 2024-06-06 RX ADMIN — ATORVASTATIN CALCIUM 40 MG: 20 TABLET, FILM COATED ORAL at 19:48

## 2024-06-06 ASSESSMENT — ACTIVITIES OF DAILY LIVING (ADL)
ADLS_ACUITY_SCORE: 38
ADLS_ACUITY_SCORE: 38
ADLS_ACUITY_SCORE: 33
ADLS_ACUITY_SCORE: 33
ADLS_ACUITY_SCORE: 38
ADLS_ACUITY_SCORE: 33
ADLS_ACUITY_SCORE: 38
ADLS_ACUITY_SCORE: 33
ADLS_ACUITY_SCORE: 38
ADLS_ACUITY_SCORE: 38
ADLS_ACUITY_SCORE: 33

## 2024-06-06 NOTE — PHARMACY-ANTICOAGULATION SERVICE
Clinical Pharmacy - Warfarin Dosing Consult     Pharmacy has been consulted to manage this patient s warfarin therapy.  Indication: Atrial Fibrillation  Therapy Goal: INR 2-3  OP Anticoag Clinic: [facility]  Warfarin Prior to Admission: Yes  Warfarin PTA Regimen: 3mg daily  Recent documented change in oral intake/nutrition: Unknown  Dose Comments: hold for INR 3.33    INR   Date Value Ref Range Status   06/06/2024 3.33 (H) 0.85 - 1.15 Final   05/29/2024 2.05 (H) 0.85 - 1.15 Final       Recommend warfarin 0 mg today.  Pharmacy will monitor Linda Loredo daily and order warfarin doses to achieve specified goal.      Please contact pharmacy as soon as possible if the warfarin needs to be held for a procedure or if the warfarin goals change.

## 2024-06-06 NOTE — CONSULTS
Pharmacy Vancomycin Initial Note  Date of Service 2024  Patient's  1945  79 year old, female    Indication: Skin and Soft Tissue Infection    Current estimated CrCl = Estimated Creatinine Clearance: 51.1 mL/min (A) (based on SCr of 1.09 mg/dL (H)).    Creatinine for last 3 days  2024: 11:50 AM Creatinine 1.09 mg/dL    Recent Vancomycin Level(s) for last 3 days  No results found for requested labs within last 3 days.      Vancomycin IV Administrations (past 72 hours)        No vancomycin orders with administrations in past 72 hours.                    Nephrotoxins and other renal medications (From now, onward)      Start     Dose/Rate Route Frequency Ordered Stop    24 1400  vancomycin (VANCOCIN) 1,500 mg in sodium chloride 0.9 % 250 mL intermittent infusion         1,500 mg  over 90 Minutes Intravenous EVERY 24 HOURS 24 1341      24 1115  vancomycin (VANCOCIN) 1,250 mg in sodium chloride 0.9 % 250 mL intermittent infusion         1,250 mg  over 90 Minutes Intravenous ONCE 24 1114              Contrast Orders - past 72 hours (72h ago, onward)      None            [employer] Prediction of Planned Initial Vancomycin Regimen  Loading dose: 1250 mg at 14:00 2024.  Regimen: 1500 mg IV every 24 hours.  Start time: 14:00 on 2024  Exposure target: AUC24 (range)400-600 mg/L.hr   AUC24,ss: 498 mg/L.hr  Probability of AUC24 > 400: 74 %  Ctrough,ss: 15.3 mg/L  Probability of Ctrough,ss > 20: 25 %  Probability of nephrotoxicity (Lodise COSTA ): 11 %       Plan:  Start vancomycin  1250 mg IV x 1, then 1500 mg IV q24h.   Vancomycin monitoring method: AUC  Vancomycin therapeutic monitoring goal: 400-600 mg*h/L  Pharmacy will check vancomycin levels as appropriate in 1-3 Days.    Serum creatinine levels will be ordered daily for the first week of therapy and at least twice weekly for subsequent weeks.      Stephanie Botello, PharmD

## 2024-06-06 NOTE — ED PROVIDER NOTES
"  History     Chief Complaint   Patient presents with    Wound Check   Red leg  HPI  Linda DELGADILLO Awa Loredo is a 79 year old female, past medical history includes depression, hypercalcemia, asthma, urinary incontinence, type 2 diabetes, hypertension, history of left BKA, long-term use of anticoagulants, stage III renal disease, CHF, osteomyelitis, opioid dependence, presents to the emergency department from [facility] with concerns of right extremity swelling over a month now with increased redness and discharge from pre-existing lower extremity wounds.  History is obtained from the EHR as well as from the patient who states that she has had significant swelling in her right lower extremity for \"months\", 3 times weekly wound care with dressings both anteriorly and posteriorly for some skin breakdown.  She reports increasing redness over the last 24 hours alone.  She denied any fever however she did have a temperature of 100.2 at presentation and reports chills and sweats.      Allergies:  Allergies   Allergen Reactions    Prednisone Nausea and Vomiting    Morphine Nausea and Vomiting    Morphine [Fumaric Acid] Nausea and Vomiting    Nitrofurantoin      Per nursing home       Problem List:    Patient Active Problem List    Diagnosis Date Noted    Cellulitis of right lower extremity 06/06/2024     Priority: Medium    Skin ulcer of right lower leg with fat layer exposed (H) 02/26/2024     Priority: Medium    Chronic right-sided heart failure (H) 11/16/2023     Priority: Medium    Osteomyelitis (H) 10/24/2023     Priority: Medium    Diabetic foot ulcer (H) 09/29/2023     Priority: Medium    History of atrial fibrillation 09/22/2023     Priority: Medium    Non-healing wound of left heel 09/22/2023     Priority: Medium    Unable to care for self 09/22/2023     Priority: Medium    Cellulitis - bilateral lower extremities 05/27/2023     Priority: Medium    Decubitus ulcers - 5 present on buttocks/perineal " region, numerous scabbed over and unstagable on shin and bilateral feet with some bloody blisters noted on feet 05/27/2023     Priority: Medium    Warfarin-induced coagulopathy (H24) 05/27/2023     Priority: Medium    Hypoglycemia 05/27/2023     Priority: Medium    Supratherapeutic INR 05/27/2023     Priority: Medium    Cellulitis of buttock 05/26/2023     Priority: Medium    Sepsis without acute organ dysfunction, due to unspecified organism (H) 05/26/2023     Priority: Medium    UTI (urinary tract infection) - possible 05/26/2023     Priority: Medium    GUSTAVO (acute kidney injury) (H24) 05/26/2023     Priority: Medium    Dehydration 05/26/2023     Priority: Medium    Weakness 05/02/2023     Priority: Medium    Opioid dependence, uncomplicated (H) 04/26/2023     Priority: Medium    Abscess of left foot 10/31/2022     Priority: Medium    Vitamin D deficiency 09/08/2022     Priority: Medium    Anticoagulation monitoring, INR range 2-3 07/06/2022     Priority: Medium    Paroxysmal atrial fibrillation (H) 05/30/2022     Priority: Medium     Formatting of this note might be different from the original.  New onset during 5/2022 admission      Diabetic ulcer of left heel associated with type 2 diabetes mellitus, with muscle involvement without evidence of necrosis (H) 05/17/2022     Priority: Medium    Obesity, Class III, BMI 40-49.9 (morbid obesity) (H) 05/05/2022     Priority: Medium    Lower extremity edema 05/29/2019     Priority: Medium    Systolic murmur 05/29/2019     Priority: Medium    Umbilical hernia without obstruction and without gangrene 12/13/2018     Priority: Medium    Microalbuminuria due to type 2 diabetes mellitus (H) 10/25/2016     Priority: Medium    Osteopenia 05/20/2015     Priority: Medium    Primary hyperparathyroidism (H24) 01/23/2013     Priority: Medium     Formatting of this note might be different from the original.  work up January 2013 Dr. Villareal      Hypotension 10/27/2011     Priority:  Medium    Fibromyalgia 10/20/2011     Priority: Medium    Anxiety 10/20/2011     Priority: Medium    Chronic kidney disease 10/20/2011     Priority: Medium     Problem list name updated by automated process. Provider to review      Elevated liver enzymes 10/20/2011     Priority: Medium    Fracture of fibula, distal, left, closed 10/20/2011     Priority: Medium     ORIF 10/13/11      COPD (chronic obstructive pulmonary disease) (H) 04/26/2010     Priority: Medium    Chronic pain 04/26/2010     Priority: Medium    Allergic rhinitis 04/08/2008     Priority: Medium    Major depressive disorder, recurrent episode, moderate (H) 04/08/2008     Priority: Medium    Hypersomnia with sleep apnea 10/23/2007     Priority: Medium     Sleep study 2004 showing severe OBSTRUCTIVE SLEEP APNEA and recommend CPAP  Problem list name updated by automated process. Provider to review      Generalized osteoarthrosis, unspecified site 09/18/2006     Priority: Medium     February 26, 2007: Linda felt that Naprosyn was not helpful.      Routine general medical examination at a health care facility 07/19/2006     Priority: Medium     Mammogram:  Mammogram due 2/27/07.  Pap: February 26, 2007 done.  Colonoscopy: scheduled 2/07.  Lipids: 12/22/2006: CHOL 132, HDL  40,LDL 77,TRIG 78,  Influenza vaccine: 10/06  Pneuovax  Vaccine: 11/06  Adacel  Vaccine: 1/07      Diabetic polyneuropathy associated with type 2 diabetes mellitus (H) 04/07/2006     Priority: Medium     February 26, 2007: on gabapentin.  She has been switched from gabapentin to lyrica.  Problem list name updated by automated process. Provider to review       OBESITY 03/13/2006     Priority: Medium     February 26, 2007: Body mass index is 48.07 kg/(m^2).       Mixed hyperlipidemia 12/05/2005     Priority: Medium     Lipids: 12/22/2006: CHOL 132, HDL  40,LDL 77,TRIG 78,  February 26, 2007: taking: Zetia and atorvastatin.      Insomnia 07/15/2005     Priority: Medium     February 26,  2007: see by Dr. Car. On ambien and trazadone.  Problem list name updated by automated process. Provider to review      Herpes zoster 06/20/2005     Priority: Medium     February 26, 2007: On gabapentin and lidoderm.   She has been switched from gabapentin to lyrica.  Problem list name updated by automated process. Provider to review      Essential hypertension with goal blood pressure less than 140/90 05/09/2005     Priority: Medium     Cardiology referral:   February 26, 2007: taking: losartan, lisinopril, labetalol, aspirin, and furosemide.  Problem list name updated by automated process. Provider to review    Formatting of this note is different from the original.    BP Readings from Last 1 Encounters:   07/11/22 118/66     CREATININE (mg/dL)   Date Value   06/04/2022 0.89     ALBUMIN TO CREATININE RATIO,RAND UR      (mg/g creat)   Date Value   11/12/2009 25.7      combination of stress and urge URINARY INCONTINENCE 05/09/2005     Priority: Medium     February 26, 2007: On Detrol LA.      Type 2 diabetes mellitus with complication, with long-term current use of insulin (H) 04/18/2005     Priority: Medium     11/05: ophthalmology=Dr. German, exam 11/05 no evidence of diabetic retinopathy.  February 26, 2007: Poor control A1C      8.6   12/22/2006.   Medications: insulin pump: Humulog (Basal rate is 1.8 units and 1/2 usual meal bolus of insulin), Byetta, metformin. A CGMS (of insulin pump) is currently being done with diabetic educator Rosemary Pendleton.    Endocrine Consult at [facility]: see scanned notes for details. Linda has not followed up with them as of yet.  Problem list name updated by automated process. Provider to review    Formatting of this note is different from the original.  diagnosis 2001    HEMOGLOBIN A1C MONITORING (POCT) (%)   Date Value   05/28/2022 7.4 (H)   06/17/2021 7.1 (H)          Past Medical History:    Past Medical History:   Diagnosis Date    Depressive disorder, not elsewhere  classified     Herpes zoster without mention of complication     Hypercalcemia 02/24/2007    Mild intermittent asthma     Mixed hyperlipidemia due to type 2 diabetes mellitus (H) 04/08/2008    Other urinary incontinence     Type II or unspecified type diabetes mellitus with renal manifestations, uncontrolled(250.42) (H)     Type II or unspecified type diabetes mellitus without mention of complication, not stated as uncontrolled     Unspecified essential hypertension        Past Surgical History:    Past Surgical History:   Procedure Laterality Date    AMPUTATE LEG BELOW KNEE Left 10/7/2023    Procedure: LEFT GUILLOTINE BELOW KNEE AMPUTATION.;  Surgeon: Lan Otto MD;  Location:  OR    AMPUTATE LEG BELOW KNEE Left 10/14/2023    Procedure: debridement of left below the knee amputation;  Surgeon: Lan Otto MD;  Location:  OR    APPLY WOUND VAC Left 1/2/2024    Procedure: WOUND VAC APPLICATION TO LEFT BELOW KNEE STUMP;  Surgeon: Lan Otto MD;  Location:  OR    CHOLECYSTECTOMY      GRAFT SKIN SPLIT THICKNESS FROM TRUNK TO HEAD Left 1/2/2024    Procedure: APPLICATION OF SPLIT-THICKNESS SKIN GRAFT TO LEFT BELOW KNEE STUMP, GRAFT FROM LEFT THIGH;  Surgeon: Lan Otto MD;  Location:  OR    INCISION AND DRAINAGE FOOT, COMBINED Left 9/26/2023    Procedure: Surgical debridement of all nonviable skin and soft tissue and bone left foot;  Surgeon: Nolberto Thorne DPM;  Location: [facility] OR    IR LOWER EXTREMITY ANGIOGRAM LEFT  10/3/2023    ligation of fallopian tube      OPEN REDUCTION INTERNAL FIXATION ANKLE  10/13/11    left    pinning of left 2nd toe         Family History:    Family History   Problem Relation Age of Onset    Hypertension Mother     Heart Disease Father         heart attack and cardiomegaly    Diabetes Sister         type 2    Hypertension Sister     Respiratory Sister     Psychotic Disorder Sister         bipolar    Cancer Maternal Grandmother          throat    Cancer Paternal Grandmother         gastric       Social History:  Marital Status:   [4]  Social History     Tobacco Use    Smoking status: Never    Smokeless tobacco: Never   Substance Use Topics    Alcohol use: No    Drug use: No        Medications:    acetaminophen (TYLENOL) 325 MG tablet  aspirin 81 MG EC tablet  atorvastatin (LIPITOR) 40 MG tablet  insulin degludec (TRESIBA) 200 UNIT/ML pen  JANTOVEN ANTICOAGULANT 3 MG tablet  methadone (DOLOPHINE) 5 MG tablet  metolazone (ZAROXOLYN) 2.5 MG tablet  mineral oil-hydrophilic petrolatum (AQUAPHOR) external ointment  multivitamin (CENTRUM SILVER) tablet  ondansetron (ZOFRAN ODT) 4 MG ODT tab  OZEMPIC, 0.25 OR 0.5 MG/DOSE, 2 MG/3ML pen  polyethylene glycol (MIRALAX) 17 GM/Dose powder  senna-docusate (SENOKOT-S/PERICOLACE) 8.6-50 MG tablet  torsemide (DEMADEX) 20 MG tablet          Review of Systems   All other systems reviewed and are negative.      Physical Exam   BP: 128/83  Pulse: 68  Temp: 100.2  F (37.9  C)  Resp: 18  Weight: 104.3 kg (230 lb)      Physical Exam  Vitals and nursing note reviewed.   Constitutional:       General: She is not in acute distress.     Appearance: Normal appearance. She is normal weight. She is ill-appearing.      Comments: Patient appears ill, smells strongly of urine.   HENT:      Head: Normocephalic and atraumatic.      Right Ear: Tympanic membrane, ear canal and external ear normal.      Left Ear: Tympanic membrane, ear canal and external ear normal.      Nose: Nose normal.      Mouth/Throat:      Mouth: Mucous membranes are dry.      Pharynx: Oropharynx is clear.   Eyes:      Extraocular Movements: Extraocular movements intact.      Conjunctiva/sclera: Conjunctivae normal.      Pupils: Pupils are equal, round, and reactive to light.   Cardiovascular:      Rate and Rhythm: Normal rate and regular rhythm.      Pulses: Normal pulses.      Heart sounds: Normal heart sounds.   Pulmonary:      Effort: Pulmonary effort  is normal.      Breath sounds: Normal breath sounds.   Abdominal:      General: Bowel sounds are normal.      Palpations: Abdomen is soft.   Musculoskeletal:      Cervical back: Normal range of motion and neck supple.        Legs:    Skin:     General: Skin is warm and dry.      Capillary Refill: Capillary refill takes less than 2 seconds.   Neurological:      General: No focal deficit present.      Mental Status: She is alert and oriented to person, place, and time.   Psychiatric:         Mood and Affect: Mood normal.         Behavior: Behavior normal.         ED Course        Procedures         Results for orders placed or performed during the hospital encounter of 06/06/24 (from the past 24 hour(s))   CBC with platelets, differential    Narrative    The following orders were created for panel order CBC with platelets, differential.  Procedure                               Abnormality         Status                     ---------                               -----------         ------                     CBC with platelets and d...[[id number]]  Abnormal            Final result                 Please view results for these tests on the individual orders.   Comprehensive metabolic panel   Result Value Ref Range    Sodium 140 135 - 145 mmol/L    Potassium 3.8 3.4 - 5.3 mmol/L    Carbon Dioxide (CO2) 32 (H) 22 - 29 mmol/L    Anion Gap 15 7 - 15 mmol/L    Urea Nitrogen 41.9 (H) 8.0 - 23.0 mg/dL    Creatinine 1.09 (H) 0.51 - 0.95 mg/dL    GFR Estimate 51 (L) >60 mL/min/1.73m2    Calcium 10.2 8.8 - 10.2 mg/dL    Chloride 93 (L) 98 - 107 mmol/L    Glucose 285 (H) 70 - 99 mg/dL    Alkaline Phosphatase 123 40 - 150 U/L    AST 30 0 - 45 U/L    ALT 24 0 - 50 U/L    Protein Total 8.1 6.4 - 8.3 g/dL    Albumin 3.6 3.5 - 5.2 g/dL    Bilirubin Total 0.7 <=1.2 mg/dL   CRP Inflammation   Result Value Ref Range    CRP Inflammation 89.75 (H) <5.00 mg/L   Lactic acid whole blood   Result Value Ref Range    Lactic Acid 4.2 (HH) 0.7 -  2.0 mmol/L   INR   Result Value Ref Range    INR 3.33 (H) 0.85 - 1.15   CBC with platelets and differential   Result Value Ref Range    WBC Count 19.5 (H) 4.0 - 11.0 10e3/uL    RBC Count 4.04 3.80 - 5.20 10e6/uL    Hemoglobin 12.5 11.7 - 15.7 g/dL    Hematocrit 38.1 35.0 - 47.0 %    MCV 94 78 - 100 fL    MCH 30.9 26.5 - 33.0 pg    MCHC 32.8 31.5 - 36.5 g/dL    RDW 14.6 10.0 - 15.0 %    Platelet Count 208 150 - 450 10e3/uL    % Neutrophils 96 %    % Lymphocytes 2 %    % Monocytes 1 %    % Eosinophils 0 %    % Basophils 0 %    % Immature Granulocytes 1 %    NRBCs per 100 WBC 0 <1 /100    Absolute Neutrophils 18.8 (H) 1.6 - 8.3 10e3/uL    Absolute Lymphocytes 0.3 (L) 0.8 - 5.3 10e3/uL    Absolute Monocytes 0.2 0.0 - 1.3 10e3/uL    Absolute Eosinophils 0.1 0.0 - 0.7 10e3/uL    Absolute Basophils 0.1 0.0 - 0.2 10e3/uL    Absolute Immature Granulocytes 0.1 <=0.4 10e3/uL    Absolute NRBCs 0.0 10e3/uL   1:30 PM  Reviewed this patient with Dr. Alvarado for the hospitalist service for admission.  Agrees with admission to full inpatient status.  Transition orders placed.    Medications   sodium chloride 0.9% BOLUS 500 mL (has no administration in time range)   vancomycin (VANCOCIN) 1,250 mg in sodium chloride 0.9 % 250 mL intermittent infusion (has no administration in time range)   sodium chloride 0.9% BOLUS 1,000 mL (has no administration in time range)   vancomycin (VANCOCIN) 1,500 mg in sodium chloride 0.9 % 250 mL intermittent infusion (has no administration in time range)   piperacillin-tazobactam (ZOSYN) 3.375 g vial to attach to  mL bag (3.375 g Intravenous $New Bag 6/6/24 0474)       Assessments & Plan (with Medical Decision Making)   Assessments and plan with medical decision making at the time stamp above.    Disclaimer: This note consists of symbols derived from keyboarding, dictation and/or voice recognition software. As a result, there may be errors in the script that have gone undetected. Please consider  this when interpreting information found in this chart.        I have reviewed the nursing notes.    I have reviewed the findings, diagnosis, plan and need for follow up with the patient.          New Prescriptions    No medications on file       Final diagnoses:   Cellulitis of right lower extremity       6/6/2024   [facility] EMERGENCY DEPT       Nahun Cardoso MD  06/12/24 [id number]

## 2024-06-06 NOTE — MEDICATION SCRIBE - ADMISSION MEDICATION HISTORY
Medication Scribe Admission Medication History    Admission medication history is complete. The information provided in this note is only as accurate as the sources available at the time of the update.    Information Source(s): Facility ([facility]/) medication list/MAR via phone and [facility]  [phone]    Pertinent Information: pt refused asa, tresiba, methadone, multi vit and torsemide this morning    Changes made to PTA medication list:  Added: ozempic (Every Friday)  Deleted: methadone 10 mg, novolog, thera vit  Changed: warfarin 1 mg to jantoven 3 mg daily, methadone tid to qid, tresiba 45 unit(s) at hs to 34 unit(s) at 0700, torsemide 20 mg, 2 every day to 3 qd    Allergies reviewed with patient and updates made in EHR: yes    Medication History Completed By: Shelbie Steiner 6/6/2024 1:25 PM    PTA Med List   Medication Sig Note Last Dose    acetaminophen (TYLENOL) 325 MG tablet Take 2 tablets (650 mg) by mouth every 6 hours as needed for mild pain or other (and adjunct with moderate or severe pain or per patient request)  Unknown at unknown    aspirin 81 MG EC tablet Take 1 tablet (81 mg) by mouth daily 6/6/2024: Attempted to give med today, but pt refused 6/5/2024 at 0700    atorvastatin (LIPITOR) 40 MG tablet Take 1 tablet (40 mg) by mouth every evening  6/5/2024 at 2000    insulin degludec (TRESIBA) 200 UNIT/ML pen Inject 45 Units Subcutaneous at bedtime Hold for blood glucose less than 100 (Patient taking differently: Inject 34 Units Subcutaneous every morning Hold for blood glucose less than 100) 6/6/2024: Attempted to give med today, but pt refused   6/5/2024 at 0700    JANTOVEN ANTICOAGULANT 3 MG tablet Take 1 tablet by mouth daily  6/5/2024 at 2000    methadone (DOLOPHINE) 5 MG tablet Take 1 tablet (5 mg) by mouth 3 times daily (Patient taking differently: Take 5 mg by mouth 4 times daily 0700, 1100, 1500, 2000)  6/6/2024 at 0700    metolazone (ZAROXOLYN) 2.5 MG tablet Take 1 tablet  (2.5 mg) by mouth once a week Give on thursdays 6/6/2024: Attempted to give med today, but pt refused   6/5/2024 at 0700    mineral oil-hydrophilic petrolatum (AQUAPHOR) external ointment Apply to left thigh wound BID  6/5/2024 at pm    multivitamin (CENTRUM SILVER) tablet Take 1 tablet by mouth daily 6/6/2024: Attempted to give med today, but pt refused   6/5/2024 at 0700    ondansetron (ZOFRAN ODT) 4 MG ODT tab Take 1 tablet (4 mg) by mouth every 6 hours as needed for nausea or vomiting  Unknown at unknown    OZEMPIC, 0.25 OR 0.5 MG/DOSE, 2 MG/3ML pen Inject 0.5 mg Subcutaneous every 7 days 6/6/2024: Pt receives this injection every Friday 5/31/2024 at am    polyethylene glycol (MIRALAX) 17 GM/Dose powder Take 17 g by mouth 2 times daily as needed for constipation  6/5/2024 at am    senna-docusate (SENOKOT-S/PERICOLACE) 8.6-50 MG tablet Take 1 tablet by mouth 2 times daily as needed for constipation  Unknown at unknown    torsemide (DEMADEX) 20 MG tablet Take 2 tablets (40 mg) by mouth daily HOLD if SBP<100 (Patient taking differently: Take 3 tablets by mouth daily HOLD if SBP<100) 6/6/2024: Attempted to give med today, but pt refused   6/6/2024 at 0700

## 2024-06-06 NOTE — ED TRIAGE NOTES
Pt from [facility] in [place]. Has been having lower right leg edema since April 2. Right leg is weeping wounds and redness developed last night and more pain this morning. Pt takes methadone for pain management and took at 0830 today. EMS gave 25 mcg fent at 1020.

## 2024-06-06 NOTE — H&P
[facility]    History and Physical  Hospital Medicine       Date of Admission:  6/6/2024  Date of Service: 6/6/2024     Assessment & Plan   Linda DELGADILLO Awa Loredo is a 79 year old female with past medical hx of T2DM, hx of LLE amputation, chronic venous stasis, paroxysmal afib on warfarin, CHF, CKD, chronic opioid use who presents on 6/6/2024 with concerns for RLE cellulitis.    Cellulitis of right lower extremity  Severe sepsis without septic shock    From [facility]. Has chronic venous stasis and diabetic ulcers at baseline. Has home wound nurse 3x/wk. Has had increased swelling in RLE for 1-2m. Developed severe redness and pain 24hrs PTA. Tmax 100.2. . WBC 19.5 with left shift (18.8). CRP 90. Lactate 4.2 ?  3.9 after 1.5L IVF. Has a hx of LLE amputation due to osteomyelitis from diabetic ulcer and is very concerned she will lose the RLE.    - LR 125ml/hr after fluid boluses  - WOC and SW consulted  - Zosyn 4.5g Q6h  - Vanc; pharmacy dosed (hx of MRSA in LLE osteomyelitis and in UTIs)  - Blood cultures pending  - Consider MRI to evaluate for osteo if non-improving    UTI with hematuria  UA with large LE, many bacteria, 55 WBC, >182 RBC, moderate blood. Previous urine culture 9/23/23 grew enterococcus faecalis, MRSA, E. Coli.  - Abx as above    Type 2 diabetes mellitus with complication, with long-term current use of insulin (H)  A1c 9.1 on 4/1/2024. Uncontrolled.   Managed prior to admission with insulin degludec 45U Qam, Ozempic 0.5 weekly.  - sliding scale insulin  - Consistent carb diet  - Hold Ozempic  - Continue Degludec 45U    Chronic right-sided heart failure (H)  Essential hypertension with goal blood pressure less than 140/90  Echo 5/2022 showed EF 69%, mild MR, indeterminate diastolic function.  Managed prior to admission with Torsemide 40, Metolazone 2.5 weekly.  - Continue Torsemide  - Hold Metolazone    Paroxysmal atrial fibrillation (H)  Anticoagulation monitoring,  INR range 2-3  INR 3.33 on admit.  Managed prior to admission with Warfarin and Aspirin 81  - Pharmacy to dose Warfarin  - Continue Aspirin    Chronic venous stasis  Hx of LLE amputation due to osteomyelitis   Has chronic venous stasis with chronic non-healing wounds. Gets home wound nurse 3x/wk. 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    Mixed hyperlipidemia  Managed prior to admission with Atorvastatin 40, continue.    Chronic kidney disease  Cr 1.09 on admit (baseline ~1).  - BMP in am    Fibromyalgia  Chronic opioid use  Managed prior to admission with Methadone 5 TID, continue.    Primary hyperparathyroidism (H24)  Ca 10.2 on admit (baseline 10-11).  - BMP in am      Clinically Significant Risk Factors Present on Admission           # Hypercalcemia: Highest Ca = 10.2 mg/dL in last 2 days, will monitor as appropriate       # Drug Induced Coagulation Defect: home medication list includes an anticoagulant medication  # Drug Induced Platelet Defect: home medication list includes an antiplatelet medication   # Hypertension: Noted on problem list            # DMII: A1C = N/A within past 6 months                Diet:  Moderate consistent carb diet  DVT Prophylaxis: Warfarin  Braga Catheter: Not present  Code Status:  Full Code  Lines: peripheral IV    Disposition Plan      Expected Discharge Date: 06/07/2024             Entered: Can Vee PA-C 06/06/2024, 3:10 PM     Status: Patient is appropriate for inpatient admission for IV abx and consideration of MRI for eval of oseto  Can Vee PA-C        The patient's care was discussed with the Attending Physician, Dr. Gibbons .    Primary Care Physician   No Ref-Primary, Physician None    History is obtained from the patient, her daughter, and review of old records via the EMR.    History of Present Illness   Linda Loredo is a 79 year old female with past medical hx of T2DM, hx of LLE  amputation, chronic venous stasis, paroxysmal afib on warfarin, CHF, CKD, chronic opioid use who presents on 2024 with concerns for RLE cellulitis.    Pt presents with right leg redness and pain that progressed rapidly over 24hrs. She states it's been progressively swelling over 1-2m. Has a hx of uncontrolled T2DM and has lost her left foot due to diabetic foot ulcer that progressed to osteomyelitis. She is worried she will lose this foot as well. Is very lethargic due to recent opioid use.     Review of Systems   The 5 point Review of Systems is negative other than noted in the HPI or here.     Past Medical History      Past Medical History:   Diagnosis Date    Depressive disorder, not elsewhere classified     Herpes zoster without mention of complication     Hypercalcemia 2007    Mild intermittent asthma     Mixed hyperlipidemia due to type 2 diabetes mellitus (H) 2008    Formatting of this note is different from the original.     22 ASCVD 10 year risk: 37.9%      Last Lipids:  Last Lipids:  Chol: 2021 130   63  HDL: 2021 46  Non-HDL: 2021 84  Chol/HDL Ratio: 2021 2.83  LDL DIRECT: . No results found in past 5 years   LDL CHOLESTEROL (mg/dL)   Date Value   2021 71      22   The 10-year ASCVD risk score (Teddyjaney SMITH Jr.    Other urinary incontinence     Type II or unspecified type diabetes mellitus with renal manifestations, uncontrolled(250.42) (H)     Type II or unspecified type diabetes mellitus without mention of complication, not stated as uncontrolled     Unspecified essential hypertension      Patient Active Problem List    Diagnosis Date Noted    Cellulitis of right lower extremity 2024     Priority: Medium    Skin ulcer of right lower leg with fat layer exposed (H) 2024     Priority: Medium    Chronic right-sided heart failure (H) 2023     Priority: Medium    Osteomyelitis (H) 10/24/2023     Priority: Medium    Diabetic foot  ulcer (H) 09/29/2023     Priority: Medium    History of atrial fibrillation 09/22/2023     Priority: Medium    Non-healing wound of left heel 09/22/2023     Priority: Medium    Unable to care for self 09/22/2023     Priority: Medium    Cellulitis - bilateral lower extremities 05/27/2023     Priority: Medium    Decubitus ulcers - 5 present on buttocks/perineal region, numerous scabbed over and unstagable on shin and bilateral feet with some bloody blisters noted on feet 05/27/2023     Priority: Medium    Warfarin-induced coagulopathy (H24) 05/27/2023     Priority: Medium    Hypoglycemia 05/27/2023     Priority: Medium    Supratherapeutic INR 05/27/2023     Priority: Medium    Cellulitis of buttock 05/26/2023     Priority: Medium    Sepsis without acute organ dysfunction, due to unspecified organism (H) 05/26/2023     Priority: Medium    UTI (urinary tract infection) - possible 05/26/2023     Priority: Medium    GUSTAVO (acute kidney injury) (H24) 05/26/2023     Priority: Medium    Dehydration 05/26/2023     Priority: Medium    Weakness 05/02/2023     Priority: Medium    Opioid dependence, uncomplicated (H) 04/26/2023     Priority: Medium    Abscess of left foot 10/31/2022     Priority: Medium    Vitamin D deficiency 09/08/2022     Priority: Medium    Anticoagulation monitoring, INR range 2-3 07/06/2022     Priority: Medium    Paroxysmal atrial fibrillation (H) 05/30/2022     Priority: Medium     Formatting of this note might be different from the original.  New onset during 5/2022 admission      Diabetic ulcer of left heel associated with type 2 diabetes mellitus, with muscle involvement without evidence of necrosis (H) 05/17/2022     Priority: Medium    Obesity, Class III, BMI 40-49.9 (morbid obesity) (H) 05/05/2022     Priority: Medium    Lower extremity edema 05/29/2019     Priority: Medium    Systolic murmur 05/29/2019     Priority: Medium    Umbilical hernia without obstruction and without gangrene 12/13/2018      Priority: Medium    Microalbuminuria due to type 2 diabetes mellitus (H) 10/25/2016     Priority: Medium    Osteopenia 05/20/2015     Priority: Medium    Primary hyperparathyroidism (H24) 01/23/2013     Priority: Medium     Formatting of this note might be different from the original.  work up January 2013 Dr. Villareal      Hypotension 10/27/2011     Priority: Medium    Fibromyalgia 10/20/2011     Priority: Medium    Anxiety 10/20/2011     Priority: Medium    Chronic kidney disease 10/20/2011     Priority: Medium     Problem list name updated by automated process. Provider to review      Elevated liver enzymes 10/20/2011     Priority: Medium    Fracture of fibula, distal, left, closed 10/20/2011     Priority: Medium     ORIF 10/13/11      COPD (chronic obstructive pulmonary disease) (H) 04/26/2010     Priority: Medium    Chronic pain 04/26/2010     Priority: Medium    Allergic rhinitis 04/08/2008     Priority: Medium    Major depressive disorder, recurrent episode, moderate (H) 04/08/2008     Priority: Medium    Hypersomnia with sleep apnea 10/23/2007     Priority: Medium     Sleep study 2004 showing severe OBSTRUCTIVE SLEEP APNEA and recommend CPAP  Problem list name updated by automated process. Provider to review      Generalized osteoarthrosis, unspecified site 09/18/2006     Priority: Medium     February 26, 2007: Linda felt that Naprosyn was not helpful.      Routine general medical examination at a health care facility 07/19/2006     Priority: Medium     Mammogram:  Mammogram due 2/27/07.  Pap: February 26, 2007 done.  Colonoscopy: scheduled 2/07.  Lipids: 12/22/2006: CHOL 132, HDL  40,LDL 77,TRIG 78,  Influenza vaccine: 10/06  Pneuovax  Vaccine: 11/06  Adacel  Vaccine: 1/07      Diabetic polyneuropathy associated with type 2 diabetes mellitus (H) 04/07/2006     Priority: Medium     February 26, 2007: on gabapentin.  She has been switched from gabapentin to lyrica.  Problem list name updated by automated  process. Provider to review       OBESITY 03/13/2006     Priority: Medium     February 26, 2007: Body mass index is 48.07 kg/(m^2).       Mixed hyperlipidemia 12/05/2005     Priority: Medium     Lipids: 12/22/2006: CHOL 132, HDL  40,LDL 77,TRIG 78,  February 26, 2007: taking: Zetia and atorvastatin.      Insomnia 07/15/2005     Priority: Medium     February 26, 2007: see by Dr. Car. On ambien and trazadone.  Problem list name updated by automated process. Provider to review      Herpes zoster 06/20/2005     Priority: Medium     February 26, 2007: On gabapentin and lidoderm.   She has been switched from gabapentin to lyrica.  Problem list name updated by automated process. Provider to review      Essential hypertension with goal blood pressure less than 140/90 05/09/2005     Priority: Medium     Cardiology referral:   February 26, 2007: taking: losartan, lisinopril, labetalol, aspirin, and furosemide.  Problem list name updated by automated process. Provider to review    Formatting of this note is different from the original.    BP Readings from Last 1 Encounters:   07/11/22 118/66     CREATININE (mg/dL)   Date Value   06/04/2022 0.89     ALBUMIN TO CREATININE RATIO,RAND UR      (mg/g creat)   Date Value   11/12/2009 25.7      combination of stress and urge URINARY INCONTINENCE 05/09/2005     Priority: Medium     February 26, 2007: On Detrol LA.      Type 2 diabetes mellitus with complication, with long-term current use of insulin (H) 04/18/2005     Priority: Medium     11/05: ophthalmology=Dr. German, exam 11/05 no evidence of diabetic retinopathy.  February 26, 2007: Poor control A1C      8.6   12/22/2006.   Medications: insulin pump: Humulog (Basal rate is 1.8 units and 1/2 usual meal bolus of insulin), Byetta, metformin. A CGMS (of insulin pump) is currently being done with diabetic educator Rosemary Pendleton.    Endocrine Consult at [facility]: see scanned notes for details. Linda has not followed up with them  as of yet.  Problem list name updated by automated process. Provider to review    Formatting of this note is different from the original.  diagnosis 2001    HEMOGLOBIN A1C MONITORING (POCT) (%)   Date Value   05/28/2022 7.4 (H)   06/17/2021 7.1 (H)          Past Surgical History   Past Surgical History:   Procedure Laterality Date    AMPUTATE LEG BELOW KNEE Left 10/7/2023    Procedure: LEFT GUILLOTINE BELOW KNEE AMPUTATION.;  Surgeon: Lan Otto MD;  Location: [facility] OR    AMPUTATE LEG BELOW KNEE Left 10/14/2023    Procedure: debridement of left below the knee amputation;  Surgeon: Lan Otto MD;  Location: [facility] OR    APPLY WOUND VAC Left 1/2/2024    Procedure: WOUND VAC APPLICATION TO LEFT BELOW KNEE STUMP;  Surgeon: Lan Otto MD;  Location: [facility] OR    CHOLECYSTECTOMY      GRAFT SKIN SPLIT THICKNESS FROM TRUNK TO HEAD Left 1/2/2024    Procedure: APPLICATION OF SPLIT-THICKNESS SKIN GRAFT TO LEFT BELOW KNEE STUMP, GRAFT FROM LEFT THIGH;  Surgeon: Lan Otto MD;  Location: [facility] OR    INCISION AND DRAINAGE FOOT, COMBINED Left 9/26/2023    Procedure: Surgical debridement of all nonviable skin and soft tissue and bone left foot;  Surgeon: Nolberto Thorne DPM;  Location: [facility] OR    IR LOWER EXTREMITY ANGIOGRAM LEFT  10/3/2023    ligation of fallopian tube      OPEN REDUCTION INTERNAL FIXATION ANKLE  10/13/11    left    pinning of left 2nd toe          Prior to Admission Medications   Prior to Admission Medications   Prescriptions Last Dose Informant Patient Reported? Taking?   JANTOVEN ANTICOAGULANT 3 MG tablet 6/5/2024 at 2000 Nursing Home Yes Yes   Sig: Take 1 tablet by mouth daily   OZEMPIC, 0.25 OR 0.5 MG/DOSE, 2 MG/3ML pen 5/31/2024 at am Nursing Home Yes Yes   Sig: Inject 0.5 mg Subcutaneous every 7 days   acetaminophen (TYLENOL) 325 MG tablet Unknown at unknown detention No Yes   Sig: Take 2 tablets (650 mg) by mouth every 6 hours as needed for mild pain or other (and  adjunct with moderate or severe pain or per patient request)   aspirin 81 MG EC tablet 6/5/2024 at 0700 Nursing Home No Yes   Sig: Take 1 tablet (81 mg) by mouth daily   atorvastatin (LIPITOR) 40 MG tablet 6/5/2024 at 2000 Nursing Home No Yes   Sig: Take 1 tablet (40 mg) by mouth every evening   insulin degludec (TRESIBA) 200 UNIT/ML pen 6/5/2024 at 0700 [facility] No Yes   Sig: Inject 45 Units Subcutaneous at bedtime Hold for blood glucose less than 100   Patient taking differently: Inject 34 Units Subcutaneous every morning Hold for blood glucose less than 100   methadone (DOLOPHINE) 5 MG tablet 6/6/2024 at 0700 Nursing Home No Yes   Sig: Take 1 tablet (5 mg) by mouth 3 times daily   Patient taking differently: Take 5 mg by mouth 4 times daily 0700, 1100, 1500, 2000   metolazone (ZAROXOLYN) 2.5 MG tablet 6/5/2024 at 0700 [facility] No Yes   Sig: Take 1 tablet (2.5 mg) by mouth once a week Give on thursdays   mineral oil-hydrophilic petrolatum (AQUAPHOR) external ointment 6/5/2024 at pm Nursing Home No Yes   Sig: Apply to left thigh wound BID   multivitamin (CENTRUM SILVER) tablet 6/5/2024 at 0700 [facility] Yes Yes   Sig: Take 1 tablet by mouth daily   ondansetron (ZOFRAN ODT) 4 MG ODT tab Unknown at unknown snf No Yes   Sig: Take 1 tablet (4 mg) by mouth every 6 hours as needed for nausea or vomiting   polyethylene glycol (MIRALAX) 17 GM/Dose powder 6/5/2024 at am Nursing Home No Yes   Sig: Take 17 g by mouth 2 times daily as needed for constipation   senna-docusate (SENOKOT-S/PERICOLACE) 8.6-50 MG tablet Unknown at unknown snf No Yes   Sig: Take 1 tablet by mouth 2 times daily as needed for constipation   torsemide (DEMADEX) 20 MG tablet 6/6/2024 at 0700 Nursing Home No Yes   Sig: Take 2 tablets (40 mg) by mouth daily HOLD if SBP<100   Patient taking differently: Take 3 tablets by mouth daily HOLD if SBP<100      Facility-Administered Medications: None     Allergies   Allergies   Allergen  Reactions    Prednisone Nausea and Vomiting    Morphine Nausea and Vomiting    Morphine [Fumaric Acid] Nausea and Vomiting    Nitrofurantoin      Per nursing home       Family History    Family History   Problem Relation Age of Onset    Hypertension Mother     Heart Disease Father         heart attack and cardiomegaly    Diabetes Sister         type 2    Hypertension Sister     Respiratory Sister     Psychotic Disorder Sister         bipolar    Cancer Maternal Grandmother         throat    Cancer Paternal Grandmother         gastric     Social History   Social History     Socioeconomic History    Marital status:      Spouse name: Not on file    Number of children: Not on file    Years of education: Not on file    Highest education level: Not on file   Occupational History    Not on file   Tobacco Use    Smoking status: Never    Smokeless tobacco: Never   Substance and Sexual Activity    Alcohol use: No    Drug use: No    Sexual activity: Never   Other Topics Concern    Not on file   Social History Narrative    Not on file     Social Determinants of Health     Financial Resource Strain: Medium Risk (3/7/2022)    [facility], [facility]    Financial Resource Strain     Difficulty of Paying Living Expenses: 1     Difficulty of Paying Living Expenses: 2   Food Insecurity: No Food Insecurity (3/7/2022)    [facility]    Food Insecurity     Worried About Running Out of Food in the Last Year: 1   Transportation Needs: No Transportation Needs (3/7/2022)    [facility], [facility]    Transportation Needs     Lack of Transportation (Medical): 1   Physical Activity: Not on file   Stress: Not on file   Social Connections: Unknown (3/9/2023)    [facility] [employer]  [facility], [facility]    Social Connections     Frequency of Communication with Friends and Family: Not on file   Interpersonal Safety: Not on file   Housing Stability: Low Risk  (3/7/2022)    Received from [facility], [facility]    Housing Stability     Unable to Pay for Housing in the Last Year: 1     Physical Exam   /51   Pulse 107   Temp 100.2  F (37.9  C) (Oral)   Resp 18   Wt 104.3 kg (230 lb)   SpO2 (!) 72%   BMI 37.12 kg/m       Weight: 230 lbs 0 oz Body mass index is 37.12 kg/m .     Constitutional: Lethargic, oriented, cooperative, in no acute distress, appears nontoxic.  HENT: Oropharynx is clear. No evidence of cranial trauma. Normocephalic. Eyes anicteric.   Cardiovascular: Regular rate and rhythm, normal S1 and S2, and no murmur noted. Lymphedema  Respiratory: Clear to auscultation bilaterally with no adventitious breath sounds.   GI: Soft, non-tender, normal bowel sounds, no hepatomegaly, no masses.  Musculoskeletal: Normal muscle bulk and tone. FROM in all extremities   Skin: see media. Wounds draining purulent fluid  Neurologic: Cranial nerves 2-12 are grossly intact.         Data   Data reviewed today:   Recent Labs   Lab 06/06/24  1150   WBC 19.5*   HGB 12.5   MCV 94      INR 3.33*      POTASSIUM 3.8   CHLORIDE 93*   CO2 32*   BUN 41.9*   CR 1.09*   ANIONGAP 15   ZAKIA 10.2   *   ALBUMIN 3.6   PROTTOTAL 8.1   BILITOTAL 0.7   ALKPHOS 123   ALT 24   AST 30       No results found for this or any previous visit (from the past 24 hour(s)).    I personally reviewed no images or EKG's today

## 2024-06-07 ENCOUNTER — APPOINTMENT (OUTPATIENT)
Dept: MRI IMAGING | Facility: CLINIC | Age: 79
DRG: 872 | End: 2024-06-07
Attending: FAMILY MEDICINE
Payer: COMMERCIAL

## 2024-06-07 LAB
ANION GAP SERPL CALCULATED.3IONS-SCNC: 8 MMOL/L (ref 7–15)
BUN SERPL-MCNC: 35.8 MG/DL (ref 8–23)
CALCIUM SERPL-MCNC: 9.3 MG/DL (ref 8.8–10.2)
CHLORIDE SERPL-SCNC: 97 MMOL/L (ref 98–107)
CREAT SERPL-MCNC: 1.05 MG/DL (ref 0.51–0.95)
CRP SERPL-MCNC: 231.62 MG/L
DEPRECATED HCO3 PLAS-SCNC: 32 MMOL/L (ref 22–29)
EGFRCR SERPLBLD CKD-EPI 2021: 54 ML/MIN/1.73M2
ENTEROCOCCUS FAECALIS: NOT DETECTED
ENTEROCOCCUS FAECIUM: NOT DETECTED
ERYTHROCYTE [DISTWIDTH] IN BLOOD BY AUTOMATED COUNT: 14.7 % (ref 10–15)
GLUCOSE BLDC GLUCOMTR-MCNC: 133 MG/DL (ref 70–99)
GLUCOSE BLDC GLUCOMTR-MCNC: 60 MG/DL (ref 70–99)
GLUCOSE BLDC GLUCOMTR-MCNC: 66 MG/DL (ref 70–99)
GLUCOSE BLDC GLUCOMTR-MCNC: 71 MG/DL (ref 70–99)
GLUCOSE BLDC GLUCOMTR-MCNC: 82 MG/DL (ref 70–99)
GLUCOSE BLDC GLUCOMTR-MCNC: 85 MG/DL (ref 70–99)
GLUCOSE BLDC GLUCOMTR-MCNC: 91 MG/DL (ref 70–99)
GLUCOSE SERPL-MCNC: 101 MG/DL (ref 70–99)
HCT VFR BLD AUTO: 42.8 % (ref 35–47)
HGB BLD-MCNC: 13.8 G/DL (ref 11.7–15.7)
INR PPP: 4.69 (ref 0.85–1.15)
LISTERIA SPECIES (DETECTED/NOT DETECTED): NOT DETECTED
MCH RBC QN AUTO: 30.6 PG (ref 26.5–33)
MCHC RBC AUTO-ENTMCNC: 32.2 G/DL (ref 31.5–36.5)
MCV RBC AUTO: 95 FL (ref 78–100)
PLATELET # BLD AUTO: 122 10E3/UL (ref 150–450)
POTASSIUM SERPL-SCNC: 3.4 MMOL/L (ref 3.4–5.3)
RBC # BLD AUTO: 4.51 10E6/UL (ref 3.8–5.2)
SODIUM SERPL-SCNC: 137 MMOL/L (ref 135–145)
STAPHYLOCOCCUS AUREUS: NOT DETECTED
STAPHYLOCOCCUS EPIDERMIDIS: NOT DETECTED
STAPHYLOCOCCUS LUGDUNENSIS: NOT DETECTED
STAPHYLOCOCCUS SPECIES: NOT DETECTED
STREPTOCOCCUS AGALACTIAE: NOT DETECTED
STREPTOCOCCUS ANGINOSUS GROUP: NOT DETECTED
STREPTOCOCCUS PNEUMONIAE: NOT DETECTED
STREPTOCOCCUS PYOGENES: NOT DETECTED
STREPTOCOCCUS SPECIES: DETECTED
WBC # BLD AUTO: 18 10E3/UL (ref 4–11)

## 2024-06-07 PROCEDURE — 250N000013 HC RX MED GY IP 250 OP 250 PS 637: Performed by: FAMILY MEDICINE

## 2024-06-07 PROCEDURE — 250N000013 HC RX MED GY IP 250 OP 250 PS 637

## 2024-06-07 PROCEDURE — 85014 HEMATOCRIT: CPT | Performed by: FAMILY MEDICINE

## 2024-06-07 PROCEDURE — 86140 C-REACTIVE PROTEIN: CPT

## 2024-06-07 PROCEDURE — 250N000011 HC RX IP 250 OP 636: Performed by: FAMILY MEDICINE

## 2024-06-07 PROCEDURE — 80048 BASIC METABOLIC PNL TOTAL CA: CPT | Performed by: FAMILY MEDICINE

## 2024-06-07 PROCEDURE — 36415 COLL VENOUS BLD VENIPUNCTURE: CPT | Performed by: FAMILY MEDICINE

## 2024-06-07 PROCEDURE — 87040 BLOOD CULTURE FOR BACTERIA: CPT | Performed by: FAMILY MEDICINE

## 2024-06-07 PROCEDURE — 258N000003 HC RX IP 258 OP 636: Mod: JZ | Performed by: FAMILY MEDICINE

## 2024-06-07 PROCEDURE — 73718 MRI LOWER EXTREMITY W/O DYE: CPT | Mod: 26 | Performed by: RADIOLOGY

## 2024-06-07 PROCEDURE — 73718 MRI LOWER EXTREMITY W/O DYE: CPT | Mod: RT

## 2024-06-07 PROCEDURE — 250N000011 HC RX IP 250 OP 636: Mod: JZ

## 2024-06-07 PROCEDURE — 120N000001 HC R&B MED SURG/OB

## 2024-06-07 PROCEDURE — 99232 SBSQ HOSP IP/OBS MODERATE 35: CPT | Performed by: FAMILY MEDICINE

## 2024-06-07 PROCEDURE — 258N000003 HC RX IP 258 OP 636: Mod: JZ

## 2024-06-07 PROCEDURE — G0463 HOSPITAL OUTPT CLINIC VISIT: HCPCS

## 2024-06-07 PROCEDURE — 87040 BLOOD CULTURE FOR BACTERIA: CPT | Performed by: INTERNAL MEDICINE

## 2024-06-07 PROCEDURE — 85610 PROTHROMBIN TIME: CPT

## 2024-06-07 RX ORDER — GADOBUTROL 604.72 MG/ML
11 INJECTION INTRAVENOUS ONCE
Status: DISCONTINUED | OUTPATIENT
Start: 2024-06-07 | End: 2024-06-10 | Stop reason: HOSPADM

## 2024-06-07 RX ADMIN — METHADONE HYDROCHLORIDE 5 MG: 5 TABLET ORAL at 16:39

## 2024-06-07 RX ADMIN — METHADONE HYDROCHLORIDE 5 MG: 5 TABLET ORAL at 06:35

## 2024-06-07 RX ADMIN — PIPERACILLIN AND TAZOBACTAM 4.5 G: 4; .5 INJECTION, POWDER, FOR SOLUTION INTRAVENOUS at 19:00

## 2024-06-07 RX ADMIN — METHADONE HYDROCHLORIDE 5 MG: 5 TABLET ORAL at 13:11

## 2024-06-07 RX ADMIN — VANCOMYCIN HYDROCHLORIDE 1500 MG: 5 INJECTION, POWDER, LYOPHILIZED, FOR SOLUTION INTRAVENOUS at 14:47

## 2024-06-07 RX ADMIN — PIPERACILLIN AND TAZOBACTAM 4.5 G: 4; .5 INJECTION, POWDER, FOR SOLUTION INTRAVENOUS at 06:21

## 2024-06-07 RX ADMIN — MICONAZOLE NITRATE ANTIFUNGAL POWDER: 2 POWDER TOPICAL at 23:18

## 2024-06-07 RX ADMIN — ATORVASTATIN CALCIUM 40 MG: 20 TABLET, FILM COATED ORAL at 23:18

## 2024-06-07 RX ADMIN — TORSEMIDE 60 MG: 20 TABLET ORAL at 08:11

## 2024-06-07 RX ADMIN — METHADONE HYDROCHLORIDE 5 MG: 5 TABLET ORAL at 23:18

## 2024-06-07 RX ADMIN — OXYCODONE HYDROCHLORIDE 5 MG: 5 TABLET ORAL at 08:11

## 2024-06-07 RX ADMIN — PIPERACILLIN AND TAZOBACTAM 4.5 G: 4; .5 INJECTION, POWDER, FOR SOLUTION INTRAVENOUS at 13:13

## 2024-06-07 RX ADMIN — ASPIRIN 81 MG: 81 TABLET ORAL at 08:11

## 2024-06-07 RX ADMIN — SODIUM CHLORIDE, POTASSIUM CHLORIDE, SODIUM LACTATE AND CALCIUM CHLORIDE: 600; 310; 30; 20 INJECTION, SOLUTION INTRAVENOUS at 03:23

## 2024-06-07 ASSESSMENT — ACTIVITIES OF DAILY LIVING (ADL)
ADLS_ACUITY_SCORE: 38
ADLS_ACUITY_SCORE: 46
ADLS_ACUITY_SCORE: 38
ADLS_ACUITY_SCORE: 47
ADLS_ACUITY_SCORE: 46
ADLS_ACUITY_SCORE: 34
ADLS_ACUITY_SCORE: 47
ADLS_ACUITY_SCORE: 34
ADLS_ACUITY_SCORE: 34
ADLS_ACUITY_SCORE: 47
ADLS_ACUITY_SCORE: 46
ADLS_ACUITY_SCORE: 47
ADLS_ACUITY_SCORE: 34
ADLS_ACUITY_SCORE: 47
ADLS_ACUITY_SCORE: 47
ADLS_ACUITY_SCORE: 34
ADLS_ACUITY_SCORE: 47
ADLS_ACUITY_SCORE: 34
ADLS_ACUITY_SCORE: 34
ADLS_ACUITY_SCORE: 46
ADLS_ACUITY_SCORE: 34

## 2024-06-07 NOTE — PHARMACY-ANTICOAGULATION SERVICE
Warfarin Therapy Hold Note  This patient is currently receiving warfarin for Atrial fibrillation.    Goal INR:  2-3.      Anticoagulation Dose History  More data exists         Latest Ref Rng & Units 4/15/2024 4/29/2024 5/6/2024 5/15/2024 5/29/2024 6/6/2024 6/7/2024   Recent Dosing and Labs   INR 0.85 - 1.15 2.27  1.67  1.79  2.61  2.05  3.33  4.69          Bleeding Signs/Symptoms:  None    Assessment:  Current INR supratherapeutic.  This is most likely due to: current illness and antibiotics    Plan:  HOLD today s warfarin dose.   An order has been placed in EPIC for  Warfarin- No Dose Today     Do not recommend reversal with vitamin K or FFP at this time.    Recheck next INR :  tomorrow with AM labs    The primary team has been contacted about the above plan/primary team is aware of need to hold dose/team notification not necessary.    Thank you,    Mariana Shay, ElliD

## 2024-06-07 NOTE — PROGRESS NOTES
Patient is alert and orientated. SHe will let her needs be known. She has had the periwick in and had 600ml output that was red urine. She has a left PIV that is running 125ml/hr LR and antibiotics Zosyn every 6 hours as well. She is up with assist of 2 with a lift and is normally wheel chair bound. She takes Methadone for Pain. SHe is a blood glucose and they have been 140 at HS and 82 at 0200. Her weight was 114.5kg this am.SHe has been on 1L of oxygen over night. She has been on the pulse oximetry and dips to 85-90's when on room air.

## 2024-06-07 NOTE — DISCHARGE INSTRUCTIONS
Right leg wound care:  Cleanse with wound cleanser, pat dry  Cover with adaptic, then ABD, secure with kerlix    Please call the wound clinic to schedule follow up: [phone]    Warfarin Discharge instructions:  - your INR has been elevated while admitted to the hospital   - hold your warfarin the evening on 6/10/2024 (Monday) and have INR checked on 6/11/2024 (Tuesday)  - Resume warfarin 3 mg daily once your INR is no longer elevated or as directed by the managing provider.

## 2024-06-07 NOTE — PROGRESS NOTES
Skin affirmation note    Admitting nurse completed full skin assessment, Suresh score and Suresh interventions. This writer agrees with the initial skin assessment findings. \

## 2024-06-07 NOTE — PROGRESS NOTES
Antimicrobial Stewardship Team Note    Antimicrobial Stewardship Program - A joint venture between [facility] and  Physicians to optimize antibiotic management.  NOT a formal consult - Restricted Antimicrobial Review     Patient: Linda Loredo  MRN: [id number]  Allergies: Prednisone, Morphine, Morphine [fumaric acid], and Nitrofurantoin    Brief Summary: Linda Loredo is a 79-year-old female with a past medical history of T2DM, LLE amputation, chronic venous stasis, paroxysmal atrial fibrillation on warfarin, CHF, CKD3, and chronic opioid use who was admitted on 6/6/24 with concerns for RLE cellulitis.     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 Patient presented with right leg redness and pain that has progressed rapidly over 24 hours. She stated that swelling of her right leg has progressed over the last 1-2 months.  Patient was noted to be very lethargic on presentation due to recent opioid use. Patient presented with a low-grade fever (100.2F), tachycardia (), and hypoxia (O2 saturations 72%) requiring 1L of oxygen via nasal cannula. Labs included leukocytosis (WBC 19.5), high lactate (4.2), which decreased to 3.9 upon recheck after 2 hours, and elevated CRP (89.75). Urine analysis from catheter obtained on 6/6/24 with large leukocyte esterase, moderate blood and WBC (55), and negative for glucose and nitrites. Reflexed urine culture pending. An MRI of the right leg was performed today with findings compatible with cellulitis of myositis, and no evidence of osteomyelitis. Peripheral blood cultures from 6/6/24 growing Gram-positive cocci in pairs and chains in 1/4 bottles, identified as Streptococcus species per Verigene. Repeat blood cultures from 6/7/24 pending. Patient was started on Zosyn and vancomycin on  6/6/24, which she remains on today.         Active Anti-infective Medications   (From admission, onward)                 Start     Stop    06/07/24 1400  vancomycin (VANCOCIN) injection  1,500 mg,   Intravenous,   EVERY 24 HOURS        Skin and Soft Tissue Infection       --    06/06/24 1830  piperacillin-tazobactam  4.5 g,   Intravenous,   EVERY 6 HOURS        Skin and Soft Tissue Infection       --                  Assessment: Gram-positive cocci in pairs/chains, presumed Streptococcus species, bacteremia due to non-purulent cellulitis of the RLE  Today, patient is afebrile, tachycardic, and remains on 1L of oxygen. Pain curve showing decline correlating with increased utilization of pain medications. Leukocytosis (WBC 18) and lactate (2.4) persist yet decreasing, but CRP continues to rise (231.62). Suspect rise in CRP is reactive given bacteremia and RLE cellulitis with antibiotic initiation yesterday. Patient's bacteremia is likely stemming from cellulitis of RLE with indicative markers given rapid onset of redness and pain with Strep species being a common skin colonizer and most frequently implicated in non-purulent cellulitis. Other sources of bacteremia seem less likely. Patient does not have any obvious urinary symptoms. Supplemental oxygen needs are low and appear to be more for comfort in the absence of cough, sputum production that would align more with a pneumonia. An endocardiac source is low with a lower inoculum (1/4 bottles) in the blood presently, which aligns more so with translocation from the skin. Patient does not appear to have any prosthetic or foreign material in place that may be serving as a bacteremia origin. Patient does not have any obvious foot wound, lowering risk for polymicrobial infections and no evidence of osteomyelitis on imaging, recommend de-escalating from Zosyn to ceftriaxone. Given possible resistant Streptococcus species with beta-lactams (while awaiting identification),  reasonable to continue vancomycin pending culture finalization including susceptibilities for now. Given non-purulent infection with signs of improvement, recommend a 14-day total antibiotic course from first negative blood culture. Patient will likely require IV ceftriaxone upon discharge pending culture finalization and susceptibilities, consider placing a PICC line after blood cultures finalized as no growth for 48-72 hours.    Recommendations:  De-escalate from Zosyn to ceftriaxone 2 g IV every 24 hours  Agree with continuing vancomycin pending culture finalization including susceptibilities - anticipate being able to stop in ~1-2 days  Anticipate a 14-day total antibiotic course from first negative blood culture  Consider placing a PICC line after blood cultures finalized as no growth for 48-72 hours for continuation of IV ceftriaxone upon discharge pending above  On discharge, recommend weekly CBC w/ diff, SCr, ALT, AST for laboratory monitoring - Dr. Oakley will not follow outpatient labs     Pharmacy took the following actions: Called/paged provider, Electronic note created.    Discussed with ID Staff Velma Macedo MD and Stephanie Ulloa, PharmD, BCIDP     Walker Maguire, PharmD, PGY1 Resident   Phone number: [phone]     Vital Signs/Clinical Features:  Vitals         06/05 0700  06/06 0659 06/06 0700  06/07 0659 06/07 0700  06/07 1301   Most Recent      Temp ( F)   98 -  100.2    96.5 -  97.9     97.9 (36.6) 06/07 1205    Pulse   68 -  107    89 -  106     89 06/07 1205    Resp   18 -  20      18     18 06/07 1205    BP   105/57 -  134/54    105/58 -  111/60     105/58 06/07 1205    SpO2 (%)   72 -  100    93 -  96     96 06/07 1205            Labs  Estimated Creatinine Clearance: 55.8 mL/min (A) (based on SCr of 1.05 mg/dL (H)).  Recent Labs   Lab Test 02/02/24  1043 02/16/24  1111 03/01/24  1319 03/22/24  1357 06/06/24  1150 06/07/24  0536   CR 1.04* 1.00* 1.05* 1.07* 1.09* 1.05*       Recent Labs   Lab  Test 12/27/23  0958 02/02/24  1043 03/01/24  1309 03/22/24  1357 06/06/24  1150 06/07/24  0536   WBC 8.0 9.2 7.0 8.4 19.5* 18.0*   HGB 10.5* 10.9* 10.3* 11.5* 12.5 13.8   HCT 33.5* 34.6* 32.9* 35.8 38.1 42.8   MCV 90 91 92 92 94 95    200 178 152 208 122*       Recent Labs   Lab Test 05/28/23  0605 05/29/23  0518 09/22/23  1941 11/01/23  0613 11/17/23  1033 06/06/24  1150   BILITOTAL 0.3 0.2 0.6 0.2 0.5 0.7   ALKPHOS 61 75 82 97 90 123   ALBUMIN 2.4* 2.8* 3.1* 3.2* 3.8 3.6   AST 28 22 90* 19 20 30   ALT 10 6* 43 12 11 24       Recent Labs   Lab Test 05/26/23  1508 05/26/23  1943 05/26/23  2234 05/27/23  0558 05/28/23  0605 05/29/23  0518 05/30/23  0519 05/31/23  0530 06/05/23  0715 09/22/23  1941 09/23/23  0533 06/06/24  1150 06/06/24  1437 06/06/24  1945/24  0536   PCAL 0.15*  --   --  0.16* 0.15*  --   --   --   --   --  0.27*  --   --   --   --    LACT 4.4* 2.4* 0.9  --   --   --   --   --   --   --   --  4.2* 3.9* 2.4*  --    CRPI 80.57*  --   --  66.07* 46.61*   < > 20.12* 16.34* 13.80* 86.37*  --  89.75*  --   --  231.62*    < > = values in this interval not displayed.       Recent Labs   Lab Test 10/07/23  2016   VANCOMYCIN 18.3       Culture Results:  7-Day Micro Results       Procedure Component Value Units Date/Time    Blood Culture Hand, Left [[id number]] Collected: 06/07/24 1057    Order Status: Sent Lab Status: In process Updated: 06/07/24 1100    Specimen: Blood from Hand, Left     Blood Culture Hand, Right [[id number]] Collected: 06/07/24 1052    Order Status: Sent Lab Status: In process Updated: 06/07/24 1100    Specimen: Blood from Hand, Right     Blood Culture Arm, Right [[id number]] Collected: 06/07/24 0536    Order Status: Resulted Lab Status: In process Updated: 06/07/24 0656    Specimen: Blood from Arm, Right     Blood Culture Arm, Right [[id number]] Collected: 06/07/24 0536    Order Status: Resulted Lab Status: In process Updated: 06/07/24 0656    Specimen: Blood from Arm,  Right     Blood Culture Peripheral Blood     Order Status: Canceled Lab Status: No result     Specimen: Peripheral Blood     Blood Culture Peripheral Blood     Order Status: Canceled Lab Status: No result     Specimen: Peripheral Blood     Urine Culture [[id number]] Collected: 06/06/24 1414    Order Status: Sent Lab Status: In process Updated: 06/06/24 1433    Specimen: Urine, Catheter     Blood Culture Peripheral Blood [[id number]]  (Normal) Collected: 06/06/24 1220    Order Status: Completed Lab Status: Preliminary result Updated: 06/07/24 0331    Specimen: Peripheral Blood      Culture No growth after 12 hours    Narrative:      Only an Aerobic Blood Culture Bottle was collected, interpret results with caution.        Blood Culture Peripheral Blood [[id number]]  (Abnormal) Collected: 06/06/24 1150    Order Status: Completed Lab Status: Preliminary result Updated: 06/07/24 0118    Specimen: Peripheral Blood      Culture Positive on the 1st day of incubation      Gram positive cocci in pairs and chains     Comment: 1 of 2 bottles       Verigene GP Panel [[id number]]  (Abnormal) Collected: 06/06/24 1150    Order Status: Completed Lab Status: Final result Updated: 06/07/24 0352    Specimen: Peripheral Blood      Staphylococcus species Not Detected     Staphylococcus aureus Not Detected     Staphylococcus epidermidis Not Detected     Staphylococcus lugdunensis Not Detected     Enterococcus faecalis Not Detected     Enterococcus faecium Not Detected     Streptococcus species Detected     Comment: Positive for Streptococcus species other than Streptococcus pneumococcus, Streptococcus anginosus group, Streptococcus pyogenes and Streptococcus agalactiae. Performed using b-datum multiplex nucleic acid test. Final identification and antimicrobial susceptibility testing will be verified by standard methods.        Streptococcus agalactiae Not Detected     Streptococcus anginosus group Not Detected     Streptococcus  pneumoniae Not Detected     Streptococcus pyogenes Not Detected     Listeria species Not Detected    Narrative:      Specimen tested with Verigene multiplex, gram-positive blood culture nucleic acid test for the following targets: Staphylococcus aureus, Staphylococcus epidermidis, Staphylococcus lugdunensis, other Staphylococcus species, Enterococcus faecalis, Enterococcus faecium, Streptococcus species, Streptococcus agalactiae, Streptococcus anginosus group, Streptococcus pneumoniae, Streptococcus pyogenes, Listeria species, mecA (methicillin resistance), and Orlin/vanB (vancomycin resistance).            Recent Labs   Lab Test 05/26/23  1658 09/23/23  1302 06/06/24  1414   URINEPH 5.0 5.0 9.0*   NITRITE Negative Negative Negative   LEUKEST Moderate* Large* Large*   WBCU 88* >182* 55*                         Imaging: MR Tibia Fibula Lower Leg Right wo Contrast    Result Date: 6/7/2024  MR right leg without contrast 6/7/2024 9:46 AM Techniques: Multiplanar multisequence imaging of the right leg was obtained without  administration of intra-articular or intravenous contrast using routing protocol. History: cellulitis with skin wounds, markedly elevated CRP, rule out osteomyelitis or other deeper infection Comparison: None Exam was terminated due to patient discomfort before intravenous contrast was administered. Findings: Suboptimal evaluation of the bone and soft tissues owing to the large field-of-view. Diffuse subcutaneous and intramuscular edema throughout the right leg. There is no evidence of a loculated fluid collection. Evaluation of the osseous structures demonstrates normal bone marrow signal pattern. No evidence of marrow signal replacement to suggest osteomyelitis. No acute fracture or focal osseous lesion. Degenerative changes of the medial compartment of the right knee. Multi compartmental fatty infiltration of the muscles of the lower leg.     Impression: Limited exam owing to the large field-of-view  and early termination due to patient discomfort. 1. Diffuse subcutaneous and intramuscular edema throughout the right lower leg, compatible with cellulitis and myositis in the provided clinical context. 2. No marrow signal abnormality to suggest osteomyelitis. I have personally reviewed the examination and initial interpretation and I agree with the findings. APRIL RODRIGUEZ MD   SYSTEM ID:  [id number]

## 2024-06-07 NOTE — PROGRESS NOTES
[facility] ADMISSION NOTE    Patient admitted to room 2306 at approximately 1630 via cart from emergency room. Patient was accompanied by daughter.     Verbal SBAR report received from Tani GARCIA prior to patient arrival. -patient did not want male nurse.    Patient trasferred to bed via air silvio. Patient alert and oriented X 3. The patient is not having any pain.  . Admission vital signs: Blood pressure 134/54, pulse 101, temperature 98  F (36.7  C), temperature source Oral, resp. rate 18, weight 104.3 kg (230 lb), SpO2 92%. Patient and daughter  was oriented to plan of care, call light, bed controls, tv, telephone, bathroom, and visiting hours.     Risk Assessment    The following safety risks were identified during admission: fall, skin. Yellow risk band applied: yes     Skin Initial Assessment    This writer admitted this patient and completed a full skin assessment and Suresh score in the Adult PCS flowsheet.   Photo documentation of skin problem and/or wound competed via [employer] application (located under Media):  Yes    Appropriate interventions initiated as needed.     Secondary skin check completed by Theresa Prince Risk Assessment  Sensory Perception: 3-->slightly limited  Moisture: 3-->occasionally moist  Activity: 2-->chairfast  Mobility: 2-->very limited  Nutrition: 3-->adequate  Friction and Shear: 1-->problem  Suresh Score: 14  Moisture Interventions: Urinary collection device  Friction/Shear Interventions: Silicone foam sacral dressing    Education    Patient has a [place] to Observation order: No  Observation education completed and documented: N/A      Blanquita Lehman RN

## 2024-06-07 NOTE — CONSULTS
[facility]  WOC Nurse Inpatient Assessment     Consulted for: RLE    Summary: weeping to RLE, will need daily dressing changes. Patient initially refusing any kind of wound cares, took discussion and some convincing to get her to agree to applying a dressing.     Patient History (according to provider note(s):          Assessment:      Areas visualized during today's visit: Focused:    Wound location: RLE    Last photo: NA  Wound due to: Venous Ulcer  Wound history/plan of care: patient with significant edema to RLE and chronic wounds  Wound base: 100 % non-granular tissue     Palpation of the wound bed: normal      Drainage: moderate - as noted on the chux pad underneath leg     Description of drainage: serous     Measurements (length x width x depth, in cm): 15  x 25  x  0.1 cm      Tunneling: N/A     Undermining: N/A  Periwound skin: Edematous      Color: red      Temperature: warm  Odor: none  Pain: moderate  Pain interventions prior to dressing change: patient tolerated well and slow and gentle cares   Treatment goal: Heal   STATUS: initial assessment  Supplies ordered: supplies stored on unit       Treatment Plan:     RLE wound(s): Daily cleanse with wound cleanser pat dry  Cover open areas with adaptic, then ABD, secure with kerlix.      Orders: Written    RECOMMEND PRIMARY TEAM ORDER: None, at this time - patient would benefit from lymphedema therapy, however she is refusing at this time.   Education provided: plan of care  Discussed plan of care with: Patient and Nurse  WOC nurse follow-up plan: weekly  Notify WOC if wound(s) deteriorate.  Nursing to notify the Provider(s) and re-consult the WOC Nurse if new skin concern.    DATA:     Current support surface: Standard  Standard Isoflex gel  Containment of urine/stool: Incontinence Protocol  BMI: Body mass index is 40.74 kg/m .   Active diet order: Orders Placed This Encounter      Combination Diet Moderate Consistent Carb (60 g  CHO per Meal) Diet; 2 gm NA Diet     Output: I/O last 3 completed shifts:  In: -   Out: 600 [Urine:600]     Labs:   Recent Labs   Lab 06/07/24  0536 06/06/24  1150   ALBUMIN  --  3.6   HGB 13.8 12.5   INR 4.69* 3.33*   WBC 18.0* 19.5*     Pressure injury risk assessment:   Sensory Perception: 3-->slightly limited  Moisture: 3-->occasionally moist  Activity: 2-->chairfast  Mobility: 2-->very limited  Nutrition: 3-->adequate  Friction and Shear: 1-->problem  Suresh Score: 14    ERMA NUNEZ RN CWOCN, CFCN  Pager no longer is use, please contact through [employer]   Dept. Office Number: [phone]

## 2024-06-07 NOTE — PROGRESS NOTES
Patient's blood glucose was 66 at 0836.  Patient given 240 ml orange juice with 2 sugar packets prior to going to MRI.  Recheck blood glucose was 91 at 1008 upon return from MRI.  Dr. Gibbons was informed and he said to HOLD this mornings tresiba.

## 2024-06-07 NOTE — PROGRESS NOTES
[facility] Hospitalist Progress Note           Assessment & Plan        Linda DELGADILLO Awa Loredo is a 79 year old female with past medical hx of T2DM, hx of LLE amputation, chronic venous stasis, paroxysmal afib on warfarin, CHF, CKD, chronic opioid use who presents on 6/6/2024 with concerns for RLE cellulitis.     Cellulitis of right lower extremity  Severe sepsis without septic shock due to above   Bacteremia - likely strep - due to above     From [facility]. Has chronic venous stasis and diabetic ulcers at baseline. Has home wound nurse 3x/wk. Has had increased swelling in RLE for 1-2m. Developed severe redness and pain 24hrs PTA. Tmax 100.2. . WBC 19.5 with left shift (18.8). CRP 90. Lactate 4.2 ?  3.9 after 1.5L IVF. Has a hx of LLE amputation due to osteomyelitis from diabetic ulcer and is very concerned she will lose the RLE  - LR 125ml/hr after fluid boluses initially, then saline locked 6/7    - WOC and SW consulted  - Zosyn 4.5g Q6h  - Vanc; pharmacy dosed (hx of MRSA in LLE osteomyelitis and in UTIs)    - WBC slightly better but CRP up notably 6/7, appearance unchanged 6/7.     - checking MRI right lower leg 6/7   - Blood cultures from 6/6 positive in 1 of 2 for likely strep by verigene - pending   - repeat blood cultures from 6/7 pending      UTI with hematuria  UA with large LE, many bacteria, 55 WBC, >182 RBC, moderate blood. Previous urine culture 9/23/23 grew enterococcus faecalis, MRSA, E. Coli.  - urine culture pending   - Abx as above     Type 2 diabetes mellitus with complication, with long-term current use of insulin (H)  A1c 9.1 on 4/1/2024. Uncontrolled.   Managed prior to admission with insulin degludec 45U Qam, Ozempic 0.5 weekly.  - sliding scale insulin  - Consistent carb diet  - Held Ozempic while inpatient   - Continued Degludec 45U  - good control so far - following     Chronic right-sided heart failure (H)  Essential hypertension with goal blood pressure less than  140/90  Echo 5/2022 showed EF 69%, mild MR, indeterminate diastolic function.  Managed prior to admission with Torsemide 40, Metolazone 2.5 weekly.  - Continue Torsemide  - Held Metolazone on admission, likely resume 6/8    - follow weight and I/Os.       Paroxysmal atrial fibrillation (H)  Anticoagulation monitoring, INR range 2-3  INR 3.33 on admit.  Managed prior to admission with Warfarin and Aspirin 81  - Pharmacy to dose Warfarin  - Continue Aspirin      Chronic venous stasis  Hx of LLE amputation due to osteomyelitis   Has chronic venous stasis with chronic non-healing wounds. Gets home wound nurse 3x/wk. 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  - wound care consulted here as above.       Mixed hyperlipidemia  Managed prior to admission with Atorvastatin 40, continue.     Chronic kidney disease  Cr 1.09 on admit (baseline ~1).  - stable so far      Fibromyalgia  Chronic opioid use  Managed prior to admission with Methadone 5 TID, continued.     Primary hyperparathyroidism (H24)  Ca 10.2 on admit (baseline 10-11).  - stable so far               Clinically Significant Risk Factors Present on Admission           # Hypercalcemia: Highest Ca = 10.2 mg/dL in last 2 days, will monitor as appropriate        # Drug Induced Coagulation Defect: home medication list includes an anticoagulant medication  # Drug Induced Platelet Defect: home medication list includes an antiplatelet medication   # Hypertension: Noted on problem list                # DMII: A1C = N/A within past 6 months               DVT Prophylaxis: Warfarin     Braga Catheter: Not present  Code Status:  Full Code  Lines: peripheral IV              Diet  Orders Placed This Encounter      Combination Diet Moderate Consistent Carb (60 g CHO per Meal) Diet; 2 gm NA Diet                      Disposition Plan        Medically Ready for Discharge: Anticipated in 2-4 Days                  Nahun Gibbons  MD  Hospitalist Service  [facility]  Securely message with the [employer] Web Console (learn more here)  Text page via [email or web address] Paging/Directory             Interval History:   No change.  Leg feels about the same, no worse.  No fever or chills. No dyspnea.  Was on oxygen briefly overnight, but satting 96-95% on room air this AM.  No new or worsening symptoms. Erythema unchanged.                 Review of Systems:    ROS: 10 point ROS neg other than the symptoms noted above in the HPI.           Medications:   Current active medications and PTA medications reviewed, see medication list for details.            Physical Exam:   Vitals were reviewed  Patient Vitals for the past 24 hrs:   BP Temp Temp src Pulse Resp SpO2 Weight   24 0807 111/60 (!) 96.5  F (35.8  C) Oral 106 -- 93 % --   24 0622 -- -- -- -- -- -- 114.5 kg (252 lb 6.8 oz)   24 0446 115/69 99  F (37.2  C) Axillary 103 20 -- --   24 2357 109/62 99.9  F (37.7  C) Axillary 100 19 -- --   24 -- -- -- -- -- 94 % --   24 -- -- -- -- -- (!) 85 % --   24 1930 112/58 99.4  F (37.4  C) Oral 103 20 91 % --   24 1700 134/54 98  F (36.7  C) Oral 101 18 92 % --   24 1528 105/57 -- -- -- -- 90 % --   24 1458 115/54 -- -- -- -- 100 % --   24 1437 124/51 -- -- 107 -- -- --   24 1052 128/83 -- -- -- -- (!) 72 % --   24 1050 128/83 100.2  F (37.9  C) Oral 68 18 -- 104.3 kg (230 lb)       Temperatures:  Current - Temp: (!) 96.5  F (35.8  C); Max - Temp  Av.8  F (37.1  C)  Min: 96.5  F (35.8  C)  Max: 100.2  F (37.9  C)  Respiration range: Resp  Av  Min: 18  Max: 20  Pulse range: Pulse  Av.3  Min: 68  Max: 107  Blood pressure range: Systolic (24hrs), Av , Min:105 , Max:134   ; Diastolic (24hrs), Av, Min:51, Max:83    Pulse oximetry range: SpO2  Av.6 %  Min: 72 %  Max: 100 %  I/O last 3 completed shifts:  In: -   Out: 600  [Urine:600]    Intake/Output Summary (Last 24 hours) at 6/7/2024 0812  Last data filed at 6/7/2024 0000  Gross per 24 hour   Intake --   Output 600 ml   Net -600 ml     EXAM:  General: awake and alert, NAD, oriented x 3  Head: normocephalic  Neck: unremarkable, no lymphadenopathy   HEENT: oropharynx pink and moist    Heart: Regular rate and rhythm, no murmurs, rubs, or gallops  Lungs: clear to auscultation bilaterally with good air movement throughout  Abdomen: soft, non-tender, no masses or organomegaly  Extremities: left lower extremity s/p amputation but otherwise unremarkable.  Right lower extremity shows persistent erythema from ankle up to mid thigh, unchanged from yesterday.  No foot wounds or erythema.  Skin breaks unchanged, edema unchanged.    Skin otherwise unremarkable as visualized.               Data:     Reviewed data:  Results for orders placed or performed during the hospital encounter of 06/06/24 (from the past 24 hour(s))   CBC with platelets, differential    Narrative    The following orders were created for panel order CBC with platelets, differential.  Procedure                               Abnormality         Status                     ---------                               -----------         ------                     CBC with platelets and d...[[id number]]  Abnormal            Final result                 Please view results for these tests on the individual orders.   Comprehensive metabolic panel   Result Value Ref Range    Sodium 140 135 - 145 mmol/L    Potassium 3.8 3.4 - 5.3 mmol/L    Carbon Dioxide (CO2) 32 (H) 22 - 29 mmol/L    Anion Gap 15 7 - 15 mmol/L    Urea Nitrogen 41.9 (H) 8.0 - 23.0 mg/dL    Creatinine 1.09 (H) 0.51 - 0.95 mg/dL    GFR Estimate 51 (L) >60 mL/min/1.73m2    Calcium 10.2 8.8 - 10.2 mg/dL    Chloride 93 (L) 98 - 107 mmol/L    Glucose 285 (H) 70 - 99 mg/dL    Alkaline Phosphatase 123 40 - 150 U/L    AST 30 0 - 45 U/L    ALT 24 0 - 50 U/L    Protein Total 8.1 6.4 -  8.3 g/dL    Albumin 3.6 3.5 - 5.2 g/dL    Bilirubin Total 0.7 <=1.2 mg/dL   CRP Inflammation   Result Value Ref Range    CRP Inflammation 89.75 (H) <5.00 mg/L   Blood Culture Peripheral Blood    Specimen: Peripheral Blood   Result Value Ref Range    Culture Positive on the 1st day of incubation (A)     Culture Gram positive cocci in pairs and chains (AA)    Lactic acid whole blood   Result Value Ref Range    Lactic Acid 4.2 (HH) 0.7 - 2.0 mmol/L   INR   Result Value Ref Range    INR 3.33 (H) 0.85 - 1.15   CBC with platelets and differential   Result Value Ref Range    WBC Count 19.5 (H) 4.0 - 11.0 10e3/uL    RBC Count 4.04 3.80 - 5.20 10e6/uL    Hemoglobin 12.5 11.7 - 15.7 g/dL    Hematocrit 38.1 35.0 - 47.0 %    MCV 94 78 - 100 fL    MCH 30.9 26.5 - 33.0 pg    MCHC 32.8 31.5 - 36.5 g/dL    RDW 14.6 10.0 - 15.0 %    Platelet Count 208 150 - 450 10e3/uL    % Neutrophils 96 %    % Lymphocytes 2 %    % Monocytes 1 %    % Eosinophils 0 %    % Basophils 0 %    % Immature Granulocytes 1 %    NRBCs per 100 WBC 0 <1 /100    Absolute Neutrophils 18.8 (H) 1.6 - 8.3 10e3/uL    Absolute Lymphocytes 0.3 (L) 0.8 - 5.3 10e3/uL    Absolute Monocytes 0.2 0.0 - 1.3 10e3/uL    Absolute Eosinophils 0.1 0.0 - 0.7 10e3/uL    Absolute Basophils 0.1 0.0 - 0.2 10e3/uL    Absolute Immature Granulocytes 0.1 <=0.4 10e3/uL    Absolute NRBCs 0.0 10e3/uL   Verigene GP Panel    Specimen: Peripheral Blood   Result Value Ref Range    Staphylococcus species Not Detected Not Detected    Staphylococcus aureus Not Detected Not Detected    Staphylococcus epidermidis Not Detected Not Detected    Staphylococcus lugdunensis Not Detected Not Detected    Enterococcus faecalis Not Detected Not Detected    Enterococcus faecium Not Detected Not Detected    Streptococcus species Detected (A) Not Detected    Streptococcus agalactiae Not Detected Not Detected    Streptococcus anginosus group Not Detected Not Detected    Streptococcus pneumoniae Not Detected Not  Detected    Streptococcus pyogenes Not Detected Not Detected    Listeria species Not Detected Not Detected    Narrative    Specimen tested with Verigene multiplex, gram-positive blood culture nucleic acid test for the following targets: Staphylococcus aureus, Staphylococcus epidermidis, Staphylococcus lugdunensis, other Staphylococcus species, Enterococcus faecalis, Enterococcus faecium, Streptococcus species, Streptococcus agalactiae, Streptococcus anginosus group, Streptococcus pneumoniae, Streptococcus pyogenes, Listeria species, mecA (methicillin resistance), and Orlin/vanB (vancomycin resistance).   Blood Culture Peripheral Blood    Specimen: Peripheral Blood   Result Value Ref Range    Culture No growth after 12 hours     Narrative    Only an Aerobic Blood Culture Bottle was collected, interpret results with caution.       UA Macroscopic with reflex to Microscopic and Culture    Specimen: Urine, Catheter   Result Value Ref Range    Color Urine Yellow Colorless, Straw, Light Yellow, Yellow    Appearance Urine Cloudy (A) Clear    Glucose Urine Negative Negative mg/dL    Bilirubin Urine Negative Negative    Ketones Urine Negative Negative mg/dL    Specific Gravity Urine 1.014 1.003 - 1.035    Blood Urine Moderate (A) Negative    pH Urine 9.0 (H) 5.0 - 7.0    Protein Albumin Urine 100 (A) Negative mg/dL    Urobilinogen Urine Normal Normal, 2.0 mg/dL    Nitrite Urine Negative Negative    Leukocyte Esterase Urine Large (A) Negative    Bacteria Urine Many (A) None Seen /HPF    Mucus Urine Present (A) None Seen /LPF    RBC Urine >182 (H) <=2 /HPF    WBC Urine 55 (H) <=5 /HPF    Hyaline Casts Urine 2 <=2 /LPF    Narrative    Urine Culture ordered based on laboratory criteria   Lactic acid whole blood   Result Value Ref Range    Lactic Acid 3.9 (H) 0.7 - 2.0 mmol/L   Glucose by meter   Result Value Ref Range    GLUCOSE BY METER POCT 163 (H) 70 - 99 mg/dL   Lactic acid whole blood   Result Value Ref Range    Lactic Acid  2.4 (H) 0.7 - 2.0 mmol/L   Glucose by meter   Result Value Ref Range    GLUCOSE BY METER POCT 140 (H) 70 - 99 mg/dL   Glucose by meter   Result Value Ref Range    GLUCOSE BY METER POCT 82 70 - 99 mg/dL   INR   Result Value Ref Range    INR 4.69 (H) 0.85 - 1.15   CBC with platelets   Result Value Ref Range    WBC Count 18.0 (H) 4.0 - 11.0 10e3/uL    RBC Count 4.51 3.80 - 5.20 10e6/uL    Hemoglobin 13.8 11.7 - 15.7 g/dL    Hematocrit 42.8 35.0 - 47.0 %    MCV 95 78 - 100 fL    MCH 30.6 26.5 - 33.0 pg    MCHC 32.2 31.5 - 36.5 g/dL    RDW 14.7 10.0 - 15.0 %    Platelet Count 122 (L) 150 - 450 10e3/uL   Basic metabolic panel   Result Value Ref Range    Sodium 137 135 - 145 mmol/L    Potassium 3.4 3.4 - 5.3 mmol/L    Chloride 97 (L) 98 - 107 mmol/L    Carbon Dioxide (CO2) 32 (H) 22 - 29 mmol/L    Anion Gap 8 7 - 15 mmol/L    Urea Nitrogen 35.8 (H) 8.0 - 23.0 mg/dL    Creatinine 1.05 (H) 0.51 - 0.95 mg/dL    GFR Estimate 54 (L) >60 mL/min/1.73m2    Calcium 9.3 8.8 - 10.2 mg/dL    Glucose 101 (H) 70 - 99 mg/dL   CRP inflammation   Result Value Ref Range    CRP Inflammation 231.62 (H) <5.00 mg/L           Attestation:  I have reviewed today's vital signs, notes, medications, labs and imaging.  Amount of time spent managing this patient today:  45 minutes.

## 2024-06-07 NOTE — PLAN OF CARE
Problem: Adult Inpatient Plan of Care  Goal: Absence of Hospital-Acquired Illness or Injury  Intervention: Prevent Skin Injury  Recent Flowsheet Documentation  Taken 6/7/2024 1330 by Monica Boone RN  Body Position: (RLE elevated)   log-rolled   weight shifting   position changed independently  Taken 6/7/2024 1300 by Monica Boone RN  Body Position: (RLE elevated) position changed independently  Taken 6/7/2024 0817 by Monica Boone RN  Skin Protection: adhesive use limited  Device Skin Pressure Protection:   absorbent pad utilized/changed   tubing/devices free from skin contact     Problem: Adult Inpatient Plan of Care  Goal: Absence of Hospital-Acquired Illness or Injury  Intervention: Prevent and Manage VTE (Venous Thromboembolism) Risk  Recent Flowsheet Documentation  Taken 6/7/2024 0817 by Monica Boone RN  VTE Prevention/Management: (anticoagulation adjusted by pharmacy) other (see comments)     Problem: Adult Inpatient Plan of Care  Goal: Absence of Hospital-Acquired Illness or Injury  Intervention: Prevent Infection  Recent Flowsheet Documentation  Taken 6/7/2024 0817 by Monica Boone RN  Infection Prevention: hand hygiene promoted     Problem: Adult Inpatient Plan of Care  Goal: Optimal Comfort and Wellbeing  Intervention: Monitor Pain and Promote Comfort  Recent Flowsheet Documentation  Taken 6/7/2024 0811 by Monica Boone RN  Pain Management Interventions: medication (see MAR)     Problem: Skin or Soft Tissue Infection  Goal: Absence of Infection Signs and Symptoms  Outcome: Not Progressing  Intervention: Minimize and Manage Infection Progression  Recent Flowsheet Documentation  Taken 6/7/2024 0817 by Monica Boone RN  Infection Prevention: hand hygiene promoted  Isolation Precautions: contact precautions maintained     Problem: Pain Acute  Goal: Optimal Pain Control and Function  Intervention: Develop Pain Management Plan  Recent Flowsheet  Documentation  Taken 6/7/2024 0811 by Monica Boone RN  Pain Management Interventions: medication (see MAR)   Goal Outcome Evaluation:      Plan of Care Reviewed With: patient    Overall Patient Progress: no changeOverall Patient Progress: no change    Outcome Evaluation: Patient continues to have pain, redness, edema and drainage from RLE.  [facility] nurse Terrie saw patient and applied dressing.  Patient went to MRI this morning via cart; she was able to scoot herself over from the bed to the cart.  She refused to use the ceiling lift.  Encouraged elevation of RLE on pillows.  Does not want her RLE touched due to pain.  Medicated with oxycodone 5 mg orally x 1 dose today; along with her scheduled methadone.  Drinking good amounts of fluid; but no appetite for solid foods.

## 2024-06-07 NOTE — CONSULTS
Care Management Initial Consult    General Information  Assessment completed with: VM-chart review, Other (Annie RN at [facility]),    Type of CM/SW Visit: Initial Assessment    Primary Care Provider verified and updated as needed: Yes   Readmission within the last 30 days: no previous admission in last 30 days      Reason for Consult: discharge planning  Advance Care Planning: Advance Care Planning Reviewed: no concerns identified        Communication Assessment  Patient's communication style: spoken language (English or Bilingual)    Hearing Difficulty or Deaf: no   Wear Glasses or Blind: yes    Cognitive  Cognitive/Neuro/Behavioral: WDL                      Living Environment:   People in home: facility resident     Current living Arrangements: assisted living  Name of Facility: [facility]   Able to return to prior arrangements: yes     Family/Social Support:  Care provided by: self, child(angy), other (see comments) (facility staff)  Provides care for: no one  Marital Status:   Children, Facility resident(s)/Staff          Description of Support System: Supportive, Involved    Support Assessment: Adequate family and caregiver support, Adequate social supports    Current Resources:   Patient receiving home care services: Yes  Skilled Home Care Services: Skilled Nursing  Community Resources: Home Care  Equipment currently used at home: grab bar, tub/shower, grab bar, toilet, wheelchair, manual, raised toilet seat (reacher, not using a prosthesis)  Supplies currently used at home: Diabetic Supplies    Employment/Financial:  Employment Status: retired     Financial Concerns: none   Does the patient's insurance plan have a 3 day qualifying hospital stay waiver?  No    Lifestyle & Psychosocial Needs:  Social Determinants of Health     Food Insecurity: No Food Insecurity (3/7/2022)    [facility]    Food  Insecurity     Worried About Running Out of Food in the Last Year: 1   Depression: Not at risk (9/12/2023)    Received from [facility] [place] [facility]    PHQ-2     PHQ-2 TOTAL SCORE: 1   Housing Stability: Low Risk  (3/7/2022)    Received from [facility]    Housing Stability     Unable to Pay for Housing in the Last Year: 1   Tobacco Use: Low Risk  (4/1/2024)    Received from [facility] [place] [facility], [facility]    Patient History     Smoking Tobacco Use: Never     Smokeless Tobacco Use: Never     Passive Exposure: Not on file   Financial Resource Strain: Medium Risk (3/7/2022)    Received from [facility] [place] [facility]    Financial Resource Strain     Difficulty of Paying Living Expenses: 1     Difficulty of Paying Living Expenses: 2   Alcohol Use: Not on file   Transportation Needs: No Transportation Needs (3/7/2022)    Received from [facility] [place] [facility]    Transportation Needs     Lack of Transportation (Medical): 1   Physical Activity: Not on file   Interpersonal Safety: Not on file   Stress: Not on file   Social Connections: Unknown (3/9/2023)    Received from [facility] [place] [facility]    Social Connections     Frequency of Communication with Friends and Family: Not on file   Health Literacy: Not on file     Functional Status:  Prior to admission patient needed assistance:   Dependent ADLs:: Independent  Dependent IADLs:: Meal Preparation, Medication Management, Shopping, Laundry, Cooking, Cleaning     Mental Health Status:  Mental Health Status: No Current Concerns       Chemical Dependency Status:  Chemical Dependency  "Status: No Current Concerns           Values/Beliefs:  Spiritual, Cultural Beliefs, Scientology Practices, Values that affect care: no             Additional Information:    CM received referral for discharge planning.     Patient resides at [facility] (Phone: [phone] Fax:[phone]). Spoke with JOSE Valencia at [facility]. Per Annie- at baseline patient is w/c bound, however she is independent with ADLS and transfers. Facility staff assist with medications, insulin, housekeeping, and meals.      CM discussed the possibility of patient being ready for discharge in the next couple days. Annie immediately stated \"we are requesting she goes to TCU.\"  CM informed her that therapy will evaluate the patient for potential placement needs.     CM to call JOSE Kowalski on-call ([phone]) if the patient is ready for discharge back to [facility] over the weekend.     Patient is current with [facility] RN services for 3x/week wound care.     PLAN: Return to [facility] with current HC-RN services-  awaiting therapy donna STODDARD RN     "

## 2024-06-08 ENCOUNTER — APPOINTMENT (OUTPATIENT)
Dept: OCCUPATIONAL THERAPY | Facility: CLINIC | Age: 79
DRG: 872 | End: 2024-06-08
Payer: COMMERCIAL

## 2024-06-08 ENCOUNTER — APPOINTMENT (OUTPATIENT)
Dept: PHYSICAL THERAPY | Facility: CLINIC | Age: 79
DRG: 872 | End: 2024-06-08
Payer: COMMERCIAL

## 2024-06-08 LAB
ANION GAP SERPL CALCULATED.3IONS-SCNC: 7 MMOL/L (ref 7–15)
BACTERIA UR CULT: ABNORMAL
BUN SERPL-MCNC: 41.1 MG/DL (ref 8–23)
CALCIUM SERPL-MCNC: 9.5 MG/DL (ref 8.8–10.2)
CHLORIDE SERPL-SCNC: 97 MMOL/L (ref 98–107)
CREAT SERPL-MCNC: 1.28 MG/DL (ref 0.51–0.95)
CRP SERPL-MCNC: 270.25 MG/L
DEPRECATED HCO3 PLAS-SCNC: 35 MMOL/L (ref 22–29)
EGFRCR SERPLBLD CKD-EPI 2021: 42 ML/MIN/1.73M2
ERYTHROCYTE [DISTWIDTH] IN BLOOD BY AUTOMATED COUNT: 14.8 % (ref 10–15)
GLUCOSE BLDC GLUCOMTR-MCNC: 114 MG/DL (ref 70–99)
GLUCOSE BLDC GLUCOMTR-MCNC: 122 MG/DL (ref 70–99)
GLUCOSE BLDC GLUCOMTR-MCNC: 140 MG/DL (ref 70–99)
GLUCOSE BLDC GLUCOMTR-MCNC: 145 MG/DL (ref 70–99)
GLUCOSE BLDC GLUCOMTR-MCNC: 201 MG/DL (ref 70–99)
GLUCOSE BLDC GLUCOMTR-MCNC: 81 MG/DL (ref 70–99)
GLUCOSE SERPL-MCNC: 90 MG/DL (ref 70–99)
HCT VFR BLD AUTO: 33.1 % (ref 35–47)
HGB BLD-MCNC: 10.5 G/DL (ref 11.7–15.7)
INR PPP: 5.01 (ref 0.85–1.15)
MCH RBC QN AUTO: 30.5 PG (ref 26.5–33)
MCHC RBC AUTO-ENTMCNC: 31.7 G/DL (ref 31.5–36.5)
MCV RBC AUTO: 96 FL (ref 78–100)
PLATELET # BLD AUTO: 161 10E3/UL (ref 150–450)
POTASSIUM SERPL-SCNC: 4.3 MMOL/L (ref 3.4–5.3)
RBC # BLD AUTO: 3.44 10E6/UL (ref 3.8–5.2)
SODIUM SERPL-SCNC: 139 MMOL/L (ref 135–145)
WBC # BLD AUTO: 21.1 10E3/UL (ref 4–11)

## 2024-06-08 PROCEDURE — 250N000011 HC RX IP 250 OP 636: Performed by: FAMILY MEDICINE

## 2024-06-08 PROCEDURE — 97162 PT EVAL MOD COMPLEX 30 MIN: CPT | Mod: GP | Performed by: PHYSICAL THERAPIST

## 2024-06-08 PROCEDURE — 97530 THERAPEUTIC ACTIVITIES: CPT | Mod: GP | Performed by: PHYSICAL THERAPIST

## 2024-06-08 PROCEDURE — 250N000013 HC RX MED GY IP 250 OP 250 PS 637: Performed by: FAMILY MEDICINE

## 2024-06-08 PROCEDURE — 250N000011 HC RX IP 250 OP 636: Mod: JZ

## 2024-06-08 PROCEDURE — 85610 PROTHROMBIN TIME: CPT

## 2024-06-08 PROCEDURE — 120N000001 HC R&B MED SURG/OB

## 2024-06-08 PROCEDURE — 99232 SBSQ HOSP IP/OBS MODERATE 35: CPT | Performed by: FAMILY MEDICINE

## 2024-06-08 PROCEDURE — 250N000011 HC RX IP 250 OP 636: Mod: JZ | Performed by: FAMILY MEDICINE

## 2024-06-08 PROCEDURE — 86140 C-REACTIVE PROTEIN: CPT | Performed by: FAMILY MEDICINE

## 2024-06-08 PROCEDURE — 258N000003 HC RX IP 258 OP 636: Mod: JZ | Performed by: FAMILY MEDICINE

## 2024-06-08 PROCEDURE — 97165 OT EVAL LOW COMPLEX 30 MIN: CPT | Mod: GO

## 2024-06-08 PROCEDURE — 85014 HEMATOCRIT: CPT | Performed by: FAMILY MEDICINE

## 2024-06-08 PROCEDURE — 97535 SELF CARE MNGMENT TRAINING: CPT | Mod: GO

## 2024-06-08 PROCEDURE — 250N000013 HC RX MED GY IP 250 OP 250 PS 637

## 2024-06-08 PROCEDURE — 80048 BASIC METABOLIC PNL TOTAL CA: CPT | Performed by: FAMILY MEDICINE

## 2024-06-08 PROCEDURE — 36415 COLL VENOUS BLD VENIPUNCTURE: CPT

## 2024-06-08 RX ORDER — CEFTRIAXONE 2 G/1
2 INJECTION, POWDER, FOR SOLUTION INTRAMUSCULAR; INTRAVENOUS EVERY 24 HOURS
Status: DISCONTINUED | OUTPATIENT
Start: 2024-06-08 | End: 2024-06-10 | Stop reason: HOSPADM

## 2024-06-08 RX ORDER — MULTIPLE VITAMINS W/ MINERALS TAB 9MG-400MCG
1 TAB ORAL DAILY
Status: DISCONTINUED | OUTPATIENT
Start: 2024-06-08 | End: 2024-06-10 | Stop reason: HOSPADM

## 2024-06-08 RX ADMIN — VANCOMYCIN HYDROCHLORIDE 1500 MG: 5 INJECTION, POWDER, LYOPHILIZED, FOR SOLUTION INTRAVENOUS at 13:02

## 2024-06-08 RX ADMIN — CEFTRIAXONE SODIUM 2 G: 2 INJECTION, POWDER, FOR SOLUTION INTRAMUSCULAR; INTRAVENOUS at 11:49

## 2024-06-08 RX ADMIN — METHADONE HYDROCHLORIDE 5 MG: 5 TABLET ORAL at 06:39

## 2024-06-08 RX ADMIN — ASPIRIN 81 MG: 81 TABLET ORAL at 09:01

## 2024-06-08 RX ADMIN — PIPERACILLIN AND TAZOBACTAM 4.5 G: 4; .5 INJECTION, POWDER, FOR SOLUTION INTRAVENOUS at 01:09

## 2024-06-08 RX ADMIN — METHADONE HYDROCHLORIDE 5 MG: 5 TABLET ORAL at 16:18

## 2024-06-08 RX ADMIN — Medication 1 TABLET: at 13:02

## 2024-06-08 RX ADMIN — MICONAZOLE NITRATE ANTIFUNGAL POWDER: 2 POWDER TOPICAL at 21:20

## 2024-06-08 RX ADMIN — ATORVASTATIN CALCIUM 40 MG: 20 TABLET, FILM COATED ORAL at 21:05

## 2024-06-08 RX ADMIN — PIPERACILLIN AND TAZOBACTAM 4.5 G: 4; .5 INJECTION, POWDER, FOR SOLUTION INTRAVENOUS at 06:33

## 2024-06-08 RX ADMIN — MICONAZOLE NITRATE ANTIFUNGAL POWDER: 2 POWDER TOPICAL at 11:44

## 2024-06-08 RX ADMIN — TORSEMIDE 60 MG: 20 TABLET ORAL at 09:01

## 2024-06-08 RX ADMIN — METHADONE HYDROCHLORIDE 5 MG: 5 TABLET ORAL at 11:44

## 2024-06-08 RX ADMIN — METHADONE HYDROCHLORIDE 5 MG: 5 TABLET ORAL at 21:04

## 2024-06-08 ASSESSMENT — ACTIVITIES OF DAILY LIVING (ADL)
ADLS_ACUITY_SCORE: 47
ADLS_ACUITY_SCORE: 45
ADLS_ACUITY_SCORE: 45
ADLS_ACUITY_SCORE: 43
ADLS_ACUITY_SCORE: 47
PREVIOUS_RESPONSIBILITIES: LAUNDRY;FINANCES
ADLS_ACUITY_SCORE: 47
ADLS_ACUITY_SCORE: 45
ADLS_ACUITY_SCORE: 47
ADLS_ACUITY_SCORE: 45
ADLS_ACUITY_SCORE: 47
ADLS_ACUITY_SCORE: 45
ADLS_ACUITY_SCORE: 43
ADLS_ACUITY_SCORE: 47
ADLS_ACUITY_SCORE: 45
ADLS_ACUITY_SCORE: 47
ADLS_ACUITY_SCORE: 45
ADLS_ACUITY_SCORE: 43
ADLS_ACUITY_SCORE: 43
ADLS_ACUITY_SCORE: 47
ADLS_ACUITY_SCORE: 45
ADLS_ACUITY_SCORE: 45

## 2024-06-08 NOTE — PLAN OF CARE
Problem: Adult Inpatient Plan of Care  Goal: Absence of Hospital-Acquired Illness or Injury  Intervention: Prevent and Manage VTE (Venous Thromboembolism) Risk  Recent Flowsheet Documentation  Taken 6/8/2024 0100 by Nereida Aguirre RN  VTE Prevention/Management: (anticoag per pharmacy) other (see comments)     Problem: Skin or Soft Tissue Infection  Goal: Absence of Infection Signs and Symptoms  Outcome: Not Progressing     Problem: Pain Acute  Goal: Optimal Pain Control and Function  Intervention: Prevent or Manage Pain  Recent Flowsheet Documentation  Taken 6/8/2024 0100 by Nereida Aguirre RN  Bowel Elimination Promotion: adequate fluid intake promoted         Goal Outcome Evaluation:  Patient utilizing purewick external catheter with sufficient output.  Urine is dark ramona in color.  Patient has red/rigo edematous R leg and foot.  Dressing with Kerlix intact.  Mepilex dressings to buttocks and sacrum.  Abdominal folds with rash.  Cleansed perineum and buttocks with external catheter change.

## 2024-06-08 NOTE — PROGRESS NOTES
"CLINICAL NUTRITION SERVICES - ASSESSMENT NOTE     Nutrition Prescription    RECOMMENDATIONS FOR MDs/PROVIDERS TO ORDER:  None at this time    Malnutrition Status:    Patient does not meet two of the established criteria necessary for diagnosing malnutrition but is at risk for malnutrition    Recommendations already ordered by Registered Dietitian (RD):  -Ensure Max Protein with meals  -MVI-M    Future/Additional Recommendations:  Monitor PO intake, supplement use, pertinent labs, wt trends.     REASON FOR ASSESSMENT  Linda Loredo is a/an 79 year old female assessed by the dietitian for Provider Order - Suresh less than 3 consult    CLINICAL HISTORY  PMH significant for T2DM, LLE amputation, chronic venous stasis, paroxysmal afib on warfarin, CHF, CKD, chronic opioid use. Admitted 6/6/24 due to concern for RLE cellulitis.     NUTRITION HISTORY  Unable to meet with patient in person d/t working remotely, attempted to speak to patient over the phone without success. Will send Ensure Max Protein with meals to encourage intake and promote wound healing d/t pt reporting poor appetite and only able to tolerate liquids at this time.     GI: no concerns reported    CURRENT NUTRITION ORDERS  Diet: Moderate Consistent Carbohydrate (2 gm Na restriction)  Supplement: none  Intake/Tolerance: pt reporting no appetite, declining meals per flowsheets    LABS  BUN 41.1H  Cr 1.28H  GFR 42L  .25H  Hgb A1c 9.1 on 4/1    MEDICATIONS  Novolog, zosyn, torsemide    ANTHROPOMETRICS  Height: 167.6 cm (5' 6\")  Most Recent Weight: 114.5 kg (252 lb 6.8 oz)    IBW: 59.1 kg   BMI: Obesity Grade III BMI >40  Weight History:   Wt Readings from Last 15 Encounters:   06/07/24 114.5 kg (252 lb 6.8 oz)   04/01/24 104.4 kg (230 lb 3.2 oz)   03/29/24 105.3 kg (232 lb 3.2 oz)   03/22/24 104.1 kg (229 lb 9.6 oz)   03/18/24 103.3 kg (227 lb 12.8 oz)   03/08/24 107.6 kg (237 lb 3.2 oz)   03/01/24 109.5 kg (241 lb 6.4 oz)   02/23/24 108.3 " kg (238 lb 12.8 oz)   02/15/24 109 kg (240 lb 3.2 oz)   02/08/24 106.1 kg (234 lb)   02/02/24 104.6 kg (230 lb 9.6 oz)   01/26/24 100.6 kg (221 lb 12.8 oz)   01/15/24 105 kg (231 lb 6.4 oz)   01/10/24 106.4 kg (234 lb 9.6 oz)   01/08/24 105.9 kg (233 lb 6.4 oz)   No recent wt loss, wt fluctuations noted    Dosing Weight: 67 kg (adjusted for BKA/obesity)    ASSESSED NUTRITION NEEDS  Estimated Energy Needs: 1675 - 2010 kcals/day (25 - 30 kcals/kg)  Justification: Maintenance  Estimated Protein Needs: 114 - 136 grams protein/day (1 - 1.2 grams of pro/kg)  Justification: Wound healing  Estimated Fluid Needs: 1 mL/kcal  Justification: Maintenance or Per Provider    PHYSICAL FINDINGS  See malnutrition section below.  Suresh: 12  Non-healing wound to RLE edematous, reddened and weeping, WOCN following    MALNUTRITION  % Intake: Decreased intake does not meet criteria  % Weight Loss: None noted  Subcutaneous Fat Loss: Unable to assess  Muscle Loss: Unable to assess  Fluid Accumulation/Edema: Unable to assess  Malnutrition Diagnosis: Patient does not meet two of the established criteria necessary for diagnosing malnutrition but is at risk for malnutrition    NUTRITION DIAGNOSIS  Inadequate oral intake related to poor appetite as evidenced by refusing meals since admission.       INTERVENTIONS  Implementation  Nutrition Education: RD role in care   Medical food supplement therapy  Multivitamin/mineral supplement therapy     Goals  Patient to consume % of nutritionally adequate meal trays TID, or the equivalent with supplements/snacks.     Monitoring/Evaluation  Progress toward goals will be monitored and evaluated per protocol.    Annie Tang, MPH, RDN, LD  Weekend/Holiday Vocera: Weekend Holiday Clinical Dietitian [[facility]]

## 2024-06-08 NOTE — PLAN OF CARE
Problem: Adult Inpatient Plan of Care  Goal: Absence of Hospital-Acquired Illness or Injury  Intervention: Identify and Manage Fall Risk  Recent Flowsheet Documentation  Taken 6/8/2024 1628 by Monica Boone RN  Safety Promotion/Fall Prevention:   activity supervised   assistive device/personal items within reach   clutter free environment maintained   lighting adjusted   nonskid shoes/slippers when out of bed  Taken 6/8/2024 0913 by Monica Boone RN  Safety Promotion/Fall Prevention:   activity supervised   assistive device/personal items within reach   clutter free environment maintained   lighting adjusted   nonskid shoes/slippers when out of bed     Problem: Adult Inpatient Plan of Care  Goal: Absence of Hospital-Acquired Illness or Injury  Intervention: Prevent Skin Injury  Recent Flowsheet Documentation  Taken 6/8/2024 1628 by Monica Boone RN  Body Position: (pillows under right leg) position changed independently  Skin Protection: adhesive use limited  Device Skin Pressure Protection:   absorbent pad utilized/changed   adhesive use limited   tubing/devices free from skin contact  Taken 6/8/2024 1618 by Monica Boone RN  Body Position:   legs elevated   heels elevated  Taken 6/8/2024 1200 by Monica Boone RN  Body Position: (RLE elevated on pillows)   position changed independently   log-rolled   legs elevated   heels elevated   weight shifting  Taken 6/8/2024 0913 by Monica Boone RN  Body Position: (pillows under right leg) position changed independently  Skin Protection: adhesive use limited  Device Skin Pressure Protection:   absorbent pad utilized/changed   adhesive use limited   tubing/devices free from skin contact     Problem: Adult Inpatient Plan of Care  Goal: Absence of Hospital-Acquired Illness or Injury  Intervention: Prevent and Manage VTE (Venous Thromboembolism) Risk  Recent Flowsheet Documentation  Taken 6/8/2024 1628 by Monica Boone RN  VTE  Prevention/Management: (anticoagulation adjusted by pharmacy; no warfarin today) other (see comments)  Taken 6/8/2024 0913 by Monica Boone RN  VTE Prevention/Management: (anticoagulation adjusted by pharmacy) other (see comments)     Problem: Adult Inpatient Plan of Care  Goal: Absence of Hospital-Acquired Illness or Injury  Intervention: Prevent Infection  Recent Flowsheet Documentation  Taken 6/8/2024 1628 by Monica Boone RN  Infection Prevention: hand hygiene promoted  Taken 6/8/2024 0913 by Monica Boone RN  Infection Prevention: hand hygiene promoted     Problem: Adult Inpatient Plan of Care  Goal: Optimal Comfort and Wellbeing  Outcome: Not Progressing  Intervention: Monitor Pain and Promote Comfort  Recent Flowsheet Documentation  Taken 6/8/2024 1618 by Monica Boone RN  Pain Management Interventions: (methadone given) medication (see MAR)  Taken 6/8/2024 1144 by Monica Boone RN  Pain Management Interventions: (scheduled methadone) medication (see MAR)     Problem: Skin or Soft Tissue Infection  Goal: Absence of Infection Signs and Symptoms  Intervention: Minimize and Manage Infection Progression  Recent Flowsheet Documentation  Taken 6/8/2024 1628 by Monica Boone RN  Infection Prevention: hand hygiene promoted  Isolation Precautions: contact precautions maintained  Taken 6/8/2024 0913 by Monica Boone RN  Infection Prevention: hand hygiene promoted  Isolation Precautions: contact precautions maintained     Problem: Pain Acute  Goal: Optimal Pain Control and Function  Outcome: Not Progressing  Intervention: Develop Pain Management Plan  Recent Flowsheet Documentation  Taken 6/8/2024 1618 by Monica Boone RN  Pain Management Interventions: (methadone given) medication (see MAR)  Taken 6/8/2024 1144 by Monica Boone RN  Pain Management Interventions: (scheduled methadone) medication (see MAR)  Intervention: Prevent or Manage Pain  Recent  "Flowsheet Documentation  Taken 6/8/2024 1628 by Monica Boone RN  Sensory Stimulation Regulation: care clustered  Bowel Elimination Promotion: (declined bowel medication)   adequate fluid intake promoted   ambulation promoted  Medication Review/Management:   medications reviewed   high-risk medications identified  Taken 6/8/2024 0913 by Monica Boone RN  Sensory Stimulation Regulation: care clustered  Bowel Elimination Promotion:   adequate fluid intake promoted   ambulation promoted  Medication Review/Management:   medications reviewed   high-risk medications identified  Intervention: Optimize Psychosocial Wellbeing  Recent Flowsheet Documentation  Taken 6/8/2024 1628 by Monica Boone RN  Supportive Measures:   active listening utilized   verbalization of feelings encouraged  Taken 6/8/2024 0913 by Monica Boone RN  Supportive Measures:   active listening utilized   verbalization of feelings encouraged   Goal Outcome Evaluation:      Plan of Care Reviewed With: patient          Outcome Evaluation: Patient continues to have pain, redness and swelling in RLE.  Wound cares done per plan of care orders.  RLE elevated on pillows to float heel off bed.  Left stump elevated off of bed on pillow. Medicated with scheduled methadone for pain management.  Pain is \"6-8\" on 0-10 pain scale.  Patient attempted to get out of bed with physical therapy this morning; she was sitting on the edge of the bed but was unable to stand.  Declines to use ceiling lift stating \"no that hurts, I've tried it before.\"  Encouraged to frequently shift weight while in bed.      "

## 2024-06-08 NOTE — PROGRESS NOTES
06/08/24 0900   Appointment Info   Signing Clinician's Name / Credentials (PT) Lori Cardoza, PT, DPT   Living Environment   People in Home alone   Self-Care   Usual Activity Tolerance moderate   Current Activity Tolerance poor   Regular Exercise No   Equipment Currently Used at Home grab bar, tub/shower;grab bar, toilet;wheelchair, manual;raised toilet seat   Fall history within last six months no   Activity/Exercise/Self-Care Comment Pt states she lives alone and performs all transfers Indep with w/c.   General Information   Onset of Illness/Injury or Date of Surgery 06/07/24   Referring Physician Nahun Gibbons MD   Patient/Family Therapy Goals Statement (PT) return home   Pertinent History of Current Problem (include personal factors and/or comorbidities that impact the POC) Linda Loredo is a 79 year old female with past medical hx of T2DM, hx of LLE amputation, chronic venous stasis, paroxysmal afib on warfarin, CHF, CKD, chronic opioid use who presents on 6/6/2024 with concerns for RLE cellulitis.   General Observations Pt alert and agreeable to PT but states significant RLE pain.   Cognition   Affect/Mental Status (Cognition) WFL   Orientation Status (Cognition) oriented x 4   Pain Assessment   Patient Currently in Pain Yes, see Vital Sign flowsheet   Integumentary/Edema   Integumentary/Edema Comments RLE red and swollen   Range of Motion (ROM)   Range of Motion ROM deficits secondary to pain   Strength (Manual Muscle Testing)   Strength (Manual Muscle Testing) Deficits observed during functional mobility   Bed Mobility   Bed Mobility supine-sit-supine   Supine-Sit-Supine [place] (Bed Mobility) contact guard;set up   Bed Mobility Limitations decreased ability to use legs for bridging/pushing   Impairments Contributing to Impaired Bed Mobility pain;decreased ROM;decreased strength   Assistive Device (Bed Mobility) bed rails   Transfers   Transfers bed-chair transfer   Impairments  Contributing to Impaired Transfers impaired balance;pain;decreased strength;decreased ROM   Comment, (Transfers) Attempted pivot tranfer from bed to chair with assistx2 but pt unable to do so   Bed-Chair Transfer   Bed-Chair [place] (Transfers) dependent (less than 25% patient effort);2 person assist   Comment, (Bed-Chair Transfer) unable to complete transfer   Gait/Stairs (Locomotion)   [place] Level (Gait) unable to assess   Balance   Balance Comments pt has good unsupported sitting balance   Sensory Examination   Sensory Perception WFL   Coordination   Coordination no deficits were identified   Clinical Impression   Criteria for Skilled Therapeutic Intervention Yes, treatment indicated   PT Diagnosis (PT) deconditioning   Influenced by the following impairments weakness, pain, LLE  BKA   Functional limitations due to impairments mobility, transfers, balance   Clinical Presentation (PT Evaluation Complexity) evolving   Clinical Presentation Rationale cinical reasoning   Clinical Decision Making (Complexity) moderate complexity   Planned Therapy Interventions (PT) balance training;bed mobility training;home exercise program;patient/family education;ROM (range of motion);strengthening;transfer training;home program guidelines;risk factor education;progressive activity/exercise   Risk & Benefits of therapy have been explained evaluation/treatment results reviewed;care plan/treatment goals reviewed;risks/benefits reviewed;current/potential barriers reviewed;participants voiced agreement with care plan;participants included;patient   Clinical Impression Comments Pt alert and agreeable to PT after discussion of PT benefits. Pt has recent (Oct 2023) L BKA and presents with RLE cellulitis. Pt has significant pain leading to inability to perform transfers. Demario benefit from cont PT services.   PT Total Evaluation Time   PT Eval, Moderate Complexity Minutes (07259) 8   Physical Therapy Goals   PT Frequency 6x/week    PT Predicted Duration/Target Date for Goal Attainment 06/13/24   PT Goals Transfers;Bed Mobility   PT: Bed Mobility Modified independent;Supine to/from sit   PT: Transfers Moderate assist;Bed to/from chair   Interventions   Interventions Quick Adds Therapeutic Activity   Therapeutic Activity   Therapeutic Activities: dynamic activities to improve functional performance Minutes (00781) 38   Symptoms Noted During/After Treatment Fatigue;Increased pain   Treatment Detail/Skilled Intervention Pt transfer supine to sit EOB with assist for set up. Sat unsupported at EOB for about 10 min and discussed plan to transfer to chair. She did not want to use a lift. Attempted squat pivot transfer to chair using FWW and assistx2 but Pt was unable to complete d/t RLE pain. Return to supine in bed with modA for positioning. Pt supien in bed with RN in room at end of session.   PT Discharge Planning   PT Plan 6x/week transfers, LE strengthening   PT Discharge Recommendation (DC Rec) Transitional Care Facility   PT Rationale for DC Rec Pt lives home alone and was able to perform all transfers indep at baseline. Currently dependent for transfers. Will require cont PT and TCU to return to baseline and facilitate safe return home.   Total Session Time   Timed Code Treatment Minutes 38   Total Session Time (sum of timed and untimed services) 46

## 2024-06-08 NOTE — PLAN OF CARE
Alert and oriented patient refusing to get out of bed this shift. Offered assistance with ceiling lift, slider board as well as different chair/cushion and pain management. Educated on increased skin and health risks from decreased mobility, Patient continued to refuse and insistent on use of purewick instead of getting up to C.     Right LLE dressing remained intact this shift from [facility] RN change this afternoon. Patient has mepilix to coccyx with noted midline crack and blanchable redness. Also noted during shift assessment, patient has quarter size intact blister on right upper buttock. Mepilix foam dressing applied for protection.     Intradry changed in abdominal folds. Medicated powder as ordered applied as well.  Problem: Adult Inpatient Plan of Care  Goal: Absence of Hospital-Acquired Illness or Injury  Intervention: Prevent Skin Injury  Recent Flowsheet Documentation  Taken 6/7/2024 2000 by Suzanna Cole RN  Body Position:   log-rolled   legs elevated   heels elevated  Taken 6/7/2024 1830 by Suaznna Cole RN  Body Position:   weight shifting   lower extremity elevated   heels elevated  Skin Protection: adhesive use limited  Device Skin Pressure Protection:   absorbent pad utilized/changed   tubing/devices free from skin contact  Taken 6/7/2024 1620 by Suzanna Cole RN  Body Position: log-rolled

## 2024-06-08 NOTE — PROGRESS NOTES
06/08/24 1300   Appointment Info   Signing Clinician's Name / Credentials (OT) Katherine Theodore, OTR/L   Living Environment   People in Home facility resident   Current Living Arrangements assisted living   Home Accessibility no concerns   Transportation Anticipated agency   Living Environment Comments Lives at [facility] bound since L BKA in Oct 2023. At baseline mod I with ADLs from manual WC   Self-Care   Usual Activity Tolerance moderate   Current Activity Tolerance poor   Regular Exercise No   Equipment Currently Used at Home grab bar, tub/shower;grab bar, toilet;wheelchair, manual;raised toilet seat   Fall history within last six months no   Activity/Exercise/Self-Care Comment Prev mod I ADLs from WC level. Has meals, med management, housekeeping svc provided by facility.   Instrumental Activities of Daily Living (IADL)   Previous Responsibilities laundry;finances   General Information   Onset of Illness/Injury or Date of Surgery 06/06/24   Referring Physician Dr. Gibbons   Patient/Family Therapy Goal Statement (OT) Doesn't want to go to TCU   Additional Occupational Profile Info/Pertinent History of Current Problem Linda Loredo is a 79 year old female with past medical hx of T2DM, hx of LLE amputation, chronic venous stasis, paroxysmal afib on warfarin, CHF, CKD, chronic opioid use who presents on 6/6/2024 with concerns for RLE cellulitis.   Existing Precautions/Restrictions fall   Heart Disease Risk Factors Lack of physical activity;Overweight;Medical history;Age   General Observations and Info Slightly agitated. Reports has had lymphedema therapy in the past. Wears velcro compression wraps normally, but hasn't been wearing them dt wound. RLE wrapped in gauze. Red, tight, fibrotic. 3+ edema from foot to thigh.   Cognitive Status Examination   Orientation Status orientation to person, place and time   Cognitive Status Comments oriented x3, agitated.   Pain Assessment   Patient Currently in  Pain Yes, see Vital Sign flowsheet   Range of Motion Comprehensive   General Range of Motion no range of motion deficits identified   Strength Comprehensive (MMT)   General Manual Muscle Testing (MMT) Assessment no strength deficits identified   Coordination   Upper Extremity Coordination No deficits were identified   Bed Mobility   Bed Mobility supine-sit   Supine-Sit [place] (Bed Mobility) maximum assist (25% patient effort);2 person assist   Transfers   Transfer Comments Ax2, reports prev used SB   Activities of Daily Living   BADL Assessment/Intervention toileting   Toileting   Assistive Devices (Toileting) bedpan  (purewick)   [place] Level (Toileting) dependent (less than 25% patient effort)   Clinical Impression   Criteria for Skilled Therapeutic Interventions Met (OT) Yes, treatment indicated   OT Diagnosis decreased functional ind   Influenced by the following impairments recent L BKA   OT Problem List-Impairments impacting ADL problems related to;activity tolerance impaired;balance;strength;pain   Assessment of Occupational Performance 3-5 Performance Deficits   Identified Performance Deficits transfers, dressing, toileting, bathing   Planned Therapy Interventions (OT) ADL retraining;strengthening;transfer training;progressive activity/exercise   Clinical Decision Making Complexity (OT) problem focused assessment/low complexity   Risk & Benefits of therapy have been explained evaluation/treatment results reviewed;care plan/treatment goals reviewed;risks/benefits reviewed;current/potential barriers reviewed;participants voiced agreement with care plan;participants included;patient   Clinical Impression Comments Pt will benefit from ongoing OT to maximize safety and ind with ADLs/transfers   OT Total Evaluation Time   OT Eval, Low Complexity Minutes (04682) 10   OT Goals   Therapy Frequency (OT) 5 times/week   OT Predicted Duration/Target Date for Goal Attainment 06/15/24   OT Goals Lower Body  Dressing;Toilet Transfer/Toileting;Transfers   OT: Lower Body Dressing Supervision/stand-by assist;using adaptive equipment;within precautions   OT: Transfer Supervision/stand-by assist  (SB tx)   OT: Toilet Transfer/Toileting Supervision/stand-by assist;toilet transfer;cleaning and garment management;using adaptive equipment   Interventions   Interventions Quick Adds Self-Care/Home Management   Self-Care/Home Management   Self-Care/Home Mgmt/ADL, Compensatory, Meal Prep Minutes (68288) 10   Symptoms Noted During/After Treatment (Meal Preparation/Planning Training) increased pain   Treatment Detail/Skilled Intervention Educated on the role of OT and POC. Pt currently needing max Ax2 to complete bed mobility, unable to safely complete SPT. Reports she was using SB after recent BKA, and had just transitioned to SPT. Plan to bring SB tomorrow and trial for bed > WC tx. Educated on UE ROM, armrest push ups in bed to maintain UB strength for tx's. Pt demo's understanding.   OT Discharge Planning   OT Plan Sat 1/5; co-tx with PT SB tx   OT Discharge Recommendation (DC Rec) Transitional Care Facility   OT Rationale for DC Rec Currently recommend TCU as Pt req Ax2 for transfers.   OT Brief overview of current status Max Ax2 bed mobs, max Ax2 tx, total A toileting   Total Session Time   Timed Code Treatment Minutes 10   Total Session Time (sum of timed and untimed services) 20

## 2024-06-08 NOTE — PROGRESS NOTES
[facility] Hospitalist Progress Note           Assessment & Plan          Linda DELGADILLO Awa Loredo is a 79 year old female with past medical hx of T2DM, hx of LLE amputation, chronic venous stasis, paroxysmal afib on warfarin, CHF, CKD, chronic opioid use who presents on 6/6/2024 with concerns for RLE cellulitis.     Cellulitis of right lower extremity  Severe sepsis without septic shock due to above   Bacteremia - likely strep - due to above     From [facility]. Has chronic venous stasis and diabetic ulcers at baseline. Has home wound nurse 3x/wk. Has had increased swelling in RLE for 1-2m. Developed severe redness and pain 24hrs PTA. Tmax 100.2. . WBC 19.5 with left shift (18.8). CRP 90. Lactate 4.2 ?  3.9 after 1.5L IVF. Has a hx of LLE amputation due to osteomyelitis from diabetic ulcer and is very concerned she will lose the RLE  - LR 125ml/hr after fluid boluses initially, then saline locked 6/7    - WOC and SW consulted  - admitted on Zosyn 4.5g Every 6 hours and Vancomycin; pharmacy dosed (hx of MRSA in LLE osteomyelitis and in UTIs)    - WBC slightly better but CRP up notably 6/7, appearance unchanged 6/7.     - checking MRI right lower leg 6/7   - Blood cultures from 6/6 positive in 1 of 2 for likely strep by verigene - pending    - switched zosyn to rocephin 2g qd 6/8 in agreement with AMS recommendations and based on urine culture results as below.  Continued vanco.    - WBC and CRP up a bit 6/8, exam slightly improved.    - Will likely need 14 day course of IV rocephin from first negative blood culture per AMS, consider placing PICC once repeat cultures negative x 48-72 hours.   Will also need weekly CBC w/ diff, SCr, ALT, AST while on IV antibiotics per AMS.    - repeat blood cultures from 6/7 negative so far, pending      UTI with hematuria  UA with large LE, many bacteria, 55 WBC, >182 RBC, moderate blood. Previous urine culture 9/23/23 grew enterococcus faecalis, MRSA, E. Coli.  - urine  culture growing providencia rettgeri - resistant to zosyn/unasyn,  susceptible to rocephin - switched zosyn to rocephin 6/8 as above    - Abx as above     Type 2 diabetes mellitus with complication, with long-term current use of insulin (H)  A1c 9.1 on 4/1/2024. Uncontrolled.   Managed prior to admission with insulin Tresiba 36U Qam, Ozempic 0.5 weekly.  - sliding scale insulin  - Consistent carb diet  - Held Ozempic while inpatient   - initially continued Tresiba, but then held AM 6/8 due to mild hypoglycemia - ok off this so far, reassess 6/9      Chronic right-sided heart failure (H)  Essential hypertension with goal blood pressure less than 140/90  Echo 5/2022 showed EF 69%, mild MR, indeterminate diastolic function.  Managed prior to admission with Torsemide 40, Metolazone 2.5 weekly.  - Continue Torsemide  - Held Metolazone on admission, likely resume 6/9 if creatinine stable.    - follow weight and I/Os (initial reported weight of 230 clearly inaccurate).        Paroxysmal atrial fibrillation (H)  Anticoagulation monitoring, INR range 2-3  INR 3.33 on admit.  Managed prior to admission with Warfarin and Aspirin 81  - Pharmacy to dose Warfarin  - Continue Aspirin   - INR up to 5 6/8 - no active bleeding, pharmacy dosing/holding coumadin - follow for now.       Chronic venous stasis  Hx of LLE amputation due to osteomyelitis   Has chronic venous stasis with chronic non-healing wounds. Gets home wound nurse 3x/wk. 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  - wound care consulted here as above.        Mixed hyperlipidemia  Managed prior to admission with Atorvastatin 40, continue.     Chronic kidney disease  Cr 1.09 on admit (baseline ~1).  - creatinine up slightly 6/8 but not true GUSTAVO yet - following.       Fibromyalgia  Chronic opioid use  Managed prior to admission with Methadone 5 TID, continued.     Primary hyperparathyroidism (H24)  Ca 10.2 on admit  (baseline 10-11).  - stable so far               Clinically Significant Risk Factors Present on Admission           # Hypercalcemia: Highest Ca = 10.2 mg/dL in last 2 days, will monitor as appropriate        # Drug Induced Coagulation Defect: home medication list includes an anticoagulant medication  # Drug Induced Platelet Defect: home medication list includes an antiplatelet medication   # Hypertension: Noted on problem list                # DMII: A1C = N/A within past 6 months                DVT Prophylaxis: Warfarin      Braga Catheter: Not present  Code Status:  Full Code  Lines: peripheral IV           Diet  Orders Placed This Encounter      Combination Diet Moderate Consistent Carb (60 g CHO per Meal) Diet; 2 gm NA Diet                      Disposition Plan         Medically Ready for Discharge: Anticipated in 2-4 Days                  Nahun Gibbons MD  Hospitalist Service  [facility]  Securely message with the Vocera Web Console (learn more here)  Text page via [employer] Paging/Directory             Interval History:   Feels about the same today, leg perhaps very slightly better . No new or worsening symptoms.  No fever or chills. No dyspnea.                  Review of Systems:    ROS: 10 point ROS neg other than the symptoms noted above in the HPI.           Medications:   Current active medications and PTA medications reviewed, see medication list for details.            Physical Exam:   Vitals were reviewed  Patient Vitals for the past 24 hrs:   BP Temp Temp src Pulse Resp SpO2   24 0754 119/65 97.7  F (36.5  C) Oral 96 18 95 %   24 1945 130/66 98.1  F (36.7  C) Oral 92 18 95 %   24 1658 122/52 98  F (36.7  C) Oral 91 18 96 %   24 1205 105/58 97.9  F (36.6  C) Oral 89 18 96 %       Temperatures:  Current - Temp: 97.7  F (36.5  C); Max - Temp  Av.9  F (36.6  C)  Min: 97.7  F (36.5  C)  Max: 98.1  F (36.7  C)  Respiration range: Resp  Av  Min: 18   Max: 18  Pulse range: Pulse  Av  Min: 89  Max: 96  Blood pressure range: Systolic (24hrs), Av , Min:105 , Max:130   ; Diastolic (24hrs), Av, Min:52, Max:66    Pulse oximetry range: SpO2  Av.5 %  Min: 95 %  Max: 96 %  I/O last 3 completed shifts:  In: 4646 [P.O.:2560; I.V.:]  Out: 475 [Urine:475]    Intake/Output Summary (Last 24 hours) at 2024 0845  Last data filed at 2024 0700  Gross per 24 hour   Intake 2751 ml   Output 875 ml   Net 1876 ml     EXAM:  General: awake and alert, NAD, oriented x 3  Head: normocephalic  Neck: unremarkable, no lymphadenopathy   HEENT: oropharynx pink and moist    Heart: Regular rate and rhythm, no murmurs, rubs, or gallops  Lungs: clear to auscultation bilaterally with good air movement throughout  Abdomen: soft, non-tender, no masses or organomegaly  Extremities: edema unchanged in lower extremities   Skin shows slight improvement in proximal erythema of upper leg, lower leg about the same, foot unchanged.  Otherwise unremarkable as visualized.               Data:     Reviewed data:  Results for orders placed or performed during the hospital encounter of 24 (from the past 24 hour(s))   MR Tibia Fibula Lower Leg Right wo Contrast    Narrative    MR right leg without contrast 2024 9:46 AM    Techniques: Multiplanar multisequence imaging of the right leg was  obtained without  administration of intra-articular or intravenous  contrast using routing protocol.    History: cellulitis with skin wounds, markedly elevated CRP, rule out  osteomyelitis or other deeper infection    Comparison: None    Exam was terminated due to patient discomfort before intravenous  contrast was administered.    Findings:    Suboptimal evaluation of the bone and soft tissues owing to the large  field-of-view.    Diffuse subcutaneous and intramuscular edema throughout the right leg.  There is no evidence of a loculated fluid collection.    Evaluation of the osseous  structures demonstrates normal bone marrow  signal pattern. No evidence of marrow signal replacement to suggest  osteomyelitis. No acute fracture or focal osseous lesion. Degenerative  changes of the medial compartment of the right knee.    Multi compartmental fatty infiltration of the muscles of the lower  leg.      Impression    Impression:  Limited exam owing to the large field-of-view and early termination  due to patient discomfort.    1. Diffuse subcutaneous and intramuscular edema throughout the right  lower leg, compatible with cellulitis and myositis in the provided  clinical context.    2. No marrow signal abnormality to suggest osteomyelitis.    I have personally reviewed the examination and initial interpretation  and I agree with the findings.    APRIL RODRIGUEZ MD         SYSTEM ID:  [id number]   Glucose by meter   Result Value Ref Range    GLUCOSE BY METER POCT 91 70 - 99 mg/dL   Blood Culture Hand, Right    Specimen: Hand, Right; Blood   Result Value Ref Range    Culture No growth after 12 hours     Narrative    Only an Aerobic Blood Culture Bottle was collected, interpret results with caution.       Blood Culture Hand, Left    Specimen: Hand, Left; Blood   Result Value Ref Range    Culture No growth after 12 hours     Narrative    Only an Aerobic Blood Culture Bottle was collected, interpret results with caution.       Glucose by meter   Result Value Ref Range    GLUCOSE BY METER POCT 133 (H) 70 - 99 mg/dL   Glucose by meter   Result Value Ref Range    GLUCOSE BY METER POCT 60 (L) 70 - 99 mg/dL   Glucose by meter   Result Value Ref Range    GLUCOSE BY METER POCT 85 70 - 99 mg/dL   Glucose by meter   Result Value Ref Range    GLUCOSE BY METER POCT 71 70 - 99 mg/dL   Glucose by meter   Result Value Ref Range    GLUCOSE BY METER POCT 114 (H) 70 - 99 mg/dL   INR   Result Value Ref Range    INR 5.01 (HH) 0.85 - 1.15   CRP inflammation   Result Value Ref Range    CRP Inflammation 270.25 (H) <5.00 mg/L   CBC  with platelets   Result Value Ref Range    WBC Count 21.1 (H) 4.0 - 11.0 10e3/uL    RBC Count 3.44 (L) 3.80 - 5.20 10e6/uL    Hemoglobin 10.5 (L) 11.7 - 15.7 g/dL    Hematocrit 33.1 (L) 35.0 - 47.0 %    MCV 96 78 - 100 fL    MCH 30.5 26.5 - 33.0 pg    MCHC 31.7 31.5 - 36.5 g/dL    RDW 14.8 10.0 - 15.0 %    Platelet Count 161 150 - 450 10e3/uL   Basic metabolic panel   Result Value Ref Range    Sodium 139 135 - 145 mmol/L    Potassium 4.3 3.4 - 5.3 mmol/L    Chloride 97 (L) 98 - 107 mmol/L    Carbon Dioxide (CO2) 35 (H) 22 - 29 mmol/L    Anion Gap 7 7 - 15 mmol/L    Urea Nitrogen 41.1 (H) 8.0 - 23.0 mg/dL    Creatinine 1.28 (H) 0.51 - 0.95 mg/dL    GFR Estimate 42 (L) >60 mL/min/1.73m2    Calcium 9.5 8.8 - 10.2 mg/dL    Glucose 90 70 - 99 mg/dL   Glucose by meter   Result Value Ref Range    GLUCOSE BY METER POCT 81 70 - 99 mg/dL   Glucose by meter   Result Value Ref Range    GLUCOSE BY METER POCT 122 (H) 70 - 99 mg/dL           Attestation:  I have reviewed today's vital signs, notes, medications, labs and imaging.  Amount of time spent managing this patient today:  45 minutes.                          g

## 2024-06-09 ENCOUNTER — APPOINTMENT (OUTPATIENT)
Dept: OCCUPATIONAL THERAPY | Facility: CLINIC | Age: 79
DRG: 872 | End: 2024-06-09
Payer: COMMERCIAL

## 2024-06-09 ENCOUNTER — APPOINTMENT (OUTPATIENT)
Dept: PHYSICAL THERAPY | Facility: CLINIC | Age: 79
DRG: 872 | End: 2024-06-09
Payer: COMMERCIAL

## 2024-06-09 LAB
ANION GAP SERPL CALCULATED.3IONS-SCNC: 12 MMOL/L (ref 7–15)
BACTERIA BLD CULT: ABNORMAL
BACTERIA BLD CULT: ABNORMAL
BUN SERPL-MCNC: 38 MG/DL (ref 8–23)
CALCIUM SERPL-MCNC: 9.3 MG/DL (ref 8.8–10.2)
CHLORIDE SERPL-SCNC: 96 MMOL/L (ref 98–107)
CREAT SERPL-MCNC: 1.22 MG/DL (ref 0.51–0.95)
CRP SERPL-MCNC: 216.12 MG/L
DEPRECATED HCO3 PLAS-SCNC: 31 MMOL/L (ref 22–29)
EGFRCR SERPLBLD CKD-EPI 2021: 45 ML/MIN/1.73M2
ERYTHROCYTE [DISTWIDTH] IN BLOOD BY AUTOMATED COUNT: 14.6 % (ref 10–15)
GLUCOSE BLDC GLUCOMTR-MCNC: 166 MG/DL (ref 70–99)
GLUCOSE BLDC GLUCOMTR-MCNC: 169 MG/DL (ref 70–99)
GLUCOSE BLDC GLUCOMTR-MCNC: 186 MG/DL (ref 70–99)
GLUCOSE BLDC GLUCOMTR-MCNC: 198 MG/DL (ref 70–99)
GLUCOSE BLDC GLUCOMTR-MCNC: 237 MG/DL (ref 70–99)
GLUCOSE SERPL-MCNC: 170 MG/DL (ref 70–99)
HCT VFR BLD AUTO: 32 % (ref 35–47)
HGB BLD-MCNC: 10.2 G/DL (ref 11.7–15.7)
INR PPP: 5.32 (ref 0.85–1.15)
MCH RBC QN AUTO: 30.4 PG (ref 26.5–33)
MCHC RBC AUTO-ENTMCNC: 31.9 G/DL (ref 31.5–36.5)
MCV RBC AUTO: 96 FL (ref 78–100)
PLATELET # BLD AUTO: 165 10E3/UL (ref 150–450)
POTASSIUM SERPL-SCNC: 3.4 MMOL/L (ref 3.4–5.3)
RBC # BLD AUTO: 3.35 10E6/UL (ref 3.8–5.2)
SODIUM SERPL-SCNC: 139 MMOL/L (ref 135–145)
VANCOMYCIN SERPL-MCNC: 19.1 UG/ML
WBC # BLD AUTO: 14.3 10E3/UL (ref 4–11)

## 2024-06-09 PROCEDURE — 120N000001 HC R&B MED SURG/OB

## 2024-06-09 PROCEDURE — 99232 SBSQ HOSP IP/OBS MODERATE 35: CPT | Performed by: FAMILY MEDICINE

## 2024-06-09 PROCEDURE — 258N000003 HC RX IP 258 OP 636: Mod: JZ | Performed by: FAMILY MEDICINE

## 2024-06-09 PROCEDURE — 97530 THERAPEUTIC ACTIVITIES: CPT | Mod: GP | Performed by: PHYSICAL THERAPIST

## 2024-06-09 PROCEDURE — 250N000013 HC RX MED GY IP 250 OP 250 PS 637

## 2024-06-09 PROCEDURE — 36415 COLL VENOUS BLD VENIPUNCTURE: CPT

## 2024-06-09 PROCEDURE — 85027 COMPLETE CBC AUTOMATED: CPT | Performed by: FAMILY MEDICINE

## 2024-06-09 PROCEDURE — 80202 ASSAY OF VANCOMYCIN: CPT | Performed by: FAMILY MEDICINE

## 2024-06-09 PROCEDURE — 250N000013 HC RX MED GY IP 250 OP 250 PS 637: Performed by: FAMILY MEDICINE

## 2024-06-09 PROCEDURE — 86140 C-REACTIVE PROTEIN: CPT | Performed by: FAMILY MEDICINE

## 2024-06-09 PROCEDURE — 250N000011 HC RX IP 250 OP 636: Mod: JZ | Performed by: FAMILY MEDICINE

## 2024-06-09 PROCEDURE — 80048 BASIC METABOLIC PNL TOTAL CA: CPT | Performed by: FAMILY MEDICINE

## 2024-06-09 PROCEDURE — 97530 THERAPEUTIC ACTIVITIES: CPT | Mod: GO

## 2024-06-09 PROCEDURE — 85610 PROTHROMBIN TIME: CPT

## 2024-06-09 RX ORDER — CEFAZOLIN SODIUM 1 G/50ML
1250 SOLUTION INTRAVENOUS EVERY 24 HOURS
Status: DISCONTINUED | OUTPATIENT
Start: 2024-06-09 | End: 2024-06-10

## 2024-06-09 RX ADMIN — MICONAZOLE NITRATE ANTIFUNGAL POWDER: 2 POWDER TOPICAL at 11:40

## 2024-06-09 RX ADMIN — ASPIRIN 81 MG: 81 TABLET ORAL at 07:04

## 2024-06-09 RX ADMIN — MICONAZOLE NITRATE ANTIFUNGAL POWDER: 2 POWDER TOPICAL at 20:23

## 2024-06-09 RX ADMIN — METHADONE HYDROCHLORIDE 5 MG: 5 TABLET ORAL at 11:40

## 2024-06-09 RX ADMIN — INSULIN ASPART 2 UNITS: 100 INJECTION, SOLUTION INTRAVENOUS; SUBCUTANEOUS at 17:12

## 2024-06-09 RX ADMIN — METHADONE HYDROCHLORIDE 5 MG: 5 TABLET ORAL at 20:19

## 2024-06-09 RX ADMIN — INSULIN ASPART 1 UNITS: 100 INJECTION, SOLUTION INTRAVENOUS; SUBCUTANEOUS at 11:53

## 2024-06-09 RX ADMIN — METHADONE HYDROCHLORIDE 5 MG: 5 TABLET ORAL at 16:15

## 2024-06-09 RX ADMIN — SODIUM CHLORIDE 1250 MG: 9 INJECTION, SOLUTION INTRAVENOUS at 13:47

## 2024-06-09 RX ADMIN — TORSEMIDE 60 MG: 20 TABLET ORAL at 07:04

## 2024-06-09 RX ADMIN — Medication 1 TABLET: at 07:04

## 2024-06-09 RX ADMIN — ATORVASTATIN CALCIUM 40 MG: 20 TABLET, FILM COATED ORAL at 20:19

## 2024-06-09 RX ADMIN — METHADONE HYDROCHLORIDE 5 MG: 5 TABLET ORAL at 07:04

## 2024-06-09 RX ADMIN — CEFTRIAXONE SODIUM 2 G: 2 INJECTION, POWDER, FOR SOLUTION INTRAMUSCULAR; INTRAVENOUS at 11:48

## 2024-06-09 ASSESSMENT — ACTIVITIES OF DAILY LIVING (ADL)
ADLS_ACUITY_SCORE: 45
ADLS_ACUITY_SCORE: 49
ADLS_ACUITY_SCORE: 45
ADLS_ACUITY_SCORE: 49
ADLS_ACUITY_SCORE: 45
ADLS_ACUITY_SCORE: 45
ADLS_ACUITY_SCORE: 49
ADLS_ACUITY_SCORE: 45
ADLS_ACUITY_SCORE: 45
ADLS_ACUITY_SCORE: 49
ADLS_ACUITY_SCORE: 45
ADLS_ACUITY_SCORE: 49
ADLS_ACUITY_SCORE: 49
ADLS_ACUITY_SCORE: 45
ADLS_ACUITY_SCORE: 45
ADLS_ACUITY_SCORE: 49

## 2024-06-09 NOTE — PROGRESS NOTES
[facility] Hospitalist Progress Note           Assessment & Plan            Linda DELGADILLO Awa Loredo is a 79 year old female with past medical hx of T2DM, hx of LLE amputation, chronic venous stasis, paroxysmal afib on warfarin, CHF, CKD, chronic opioid use who presents on 6/6/2024 with concerns for RLE cellulitis.     Cellulitis/myositis of right lower extremity  Severe sepsis without septic shock due to above   Bacteremia - likely strep - due to above     From [facility]. Has chronic venous stasis and diabetic ulcers at baseline. Has home wound nurse 3x/wk. Has had increased swelling in RLE for 1-2m. Developed severe redness and pain 24hrs PTA. Tmax 100.2. . WBC 19.5 with left shift (18.8). CRP 90. Lactate 4.2 ?  3.9 after 1.5L IVF. Has a hx of LLE amputation due to osteomyelitis from diabetic ulcer and is very concerned she will lose the RLE  - LR 125ml/hr after fluid boluses initially, then saline locked 6/7    - WOC and SW consulted  - admitted on Zosyn 4.5g Every 6 hours and Vancomycin; pharmacy dosed (hx of MRSA in LLE osteomyelitis and in UTIs)    - WBC slightly better but CRP up notably 6/7, appearance unchanged 6/7.     - checking MRI right lower leg 6/7   - Blood cultures from 6/6 positive in 1 of 2 for likely strep by verigene - pending    - switched zosyn to rocephin 2g qd 6/8 in agreement with AMS recommendations and based on urine culture results as below.  Continued vanco.    - WBC and CRP up a bit 6/8, exam slightly improved.  Exam about the same but labs clearly improving 6/9.    - Will likely need 14 day course of IV rocephin from first negative blood culture per AMS, consider placing PICC once repeat cultures negative x 48-72 hours.   Will also need weekly CBC w/ diff, SCr, ALT, AST while on IV antibiotics per AMS.    - repeat blood cultures from 6/7 negative so far, pending      UTI with hematuria  UA with large LE, many bacteria, 55 WBC, >182 RBC, moderate blood. Previous urine  culture 9/23/23 grew enterococcus faecalis, MRSA, E. Coli.  - urine culture growing providencia rettgeri - resistant to zosyn/unasyn,  susceptible to rocephin - switched zosyn to rocephin 6/8 as above    - Abx as above     Type 2 diabetes mellitus with complication, with long-term current use of insulin (H)  A1c 9.1 on 4/1/2024. Uncontrolled.   Managed prior to admission with insulin Tresiba 36U Qam, Ozempic 0.5 weekly.  - sliding scale insulin  - Consistent carb diet  - Held Ozempic while inpatient   - initially continued Tresiba, but then held AM 6/8 due to mild hypoglycemia - ok off this so far, reassess 6/10, may be that our consistent carb diet is significantly different than her home diet.       Chronic right-sided heart failure (H)  Essential hypertension with goal blood pressure less than 140/90  Echo 5/2022 showed EF 69%, mild MR, indeterminate diastolic function.  Managed prior to admission with Torsemide 40, Metolazone 2.5 weekly.  - Continue Torsemide  - Held Metolazone on admission, due for next dose 6/13  - follow weight and I/Os (initial reported weight of 230 clearly inaccurate).        Paroxysmal atrial fibrillation (H)  Anticoagulation monitoring, INR range 2-3  INR 3.33 on admit.  Managed prior to admission with Warfarin and Aspirin 81  - Pharmacy to dose Warfarin  - Continue Aspirin   - INR up to 5.32 6/9 - no active bleeding, pharmacy dosing/holding coumadin - follow for now.       Chronic venous stasis  Hx of LLE amputation due to osteomyelitis   Has chronic venous stasis with chronic non-healing wounds. Gets home wound nurse 3x/wk. 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  - wound care consulted here as above.        Mixed hyperlipidemia  Managed prior to admission with Atorvastatin 40, continue.     Chronic kidney disease  Cr 1.09 on admit (baseline ~1).  - creatinine up slightly 6/8-6/9 but not true GUSTAVO yet - following.        Fibromyalgia  Chronic opioid use  Managed prior to admission with Methadone 5 TID, continued.     Primary hyperparathyroidism (H24)  Ca 10.2 on admit (baseline 10-11).  - stable so far      Generalized weakness  Due to poor baseline now worse with acute infection as above.  Likely will need TCU on discontinue, physical therapy and occupational therapy following.             Clinically Significant Risk Factors Present on Admission           # Hypercalcemia: Highest Ca = 10.2 mg/dL in last 2 days, will monitor as appropriate        # Drug Induced Coagulation Defect: home medication list includes an anticoagulant medication  # Drug Induced Platelet Defect: home medication list includes an antiplatelet medication   # Hypertension: Noted on problem list                # DMII: A1C = N/A within past 6 months                DVT Prophylaxis: Warfarin      Braga Catheter: Not present  Code Status:  Full Code  Lines: peripheral IV              Diet  Orders Placed This Encounter      Combination Diet Moderate Consistent Carb (60 g CHO per Meal) Diet; 2 gm NA Diet                        Disposition Plan           Medically Ready for Discharge: Anticipated in 2-4 Days  Likely to TCU                  Nahun Gibbons MD  Hospitalist Service  [facility]  Securely message with the Vocera Web Console (learn more here)  Text page via [employer] Paging/Directory             Interval History:   Patient feels about the same today.  Generally weak but no focal complaints other than leg which is unchanged.  No fever or chills.  No new or worsening symptoms.  No pain at present                Review of Systems:    ROS: 10 point ROS neg other than the symptoms noted above in the HPI.           Medications:   Current active medications and PTA medications reviewed, see medication list for details.            Physical Exam:   Vitals were reviewed  Patient Vitals for the past 24 hrs:   BP Temp Temp src Pulse Resp SpO2  Weight   24 0755 134/74 97.7  F (36.5  C) Oral 90 16 96 % 114.8 kg (253 lb 1.4 oz)   24 0506 -- -- -- -- -- -- 114.8 kg (253 lb 1.4 oz)   24 2247 132/80 98  F (36.7  C) Oral 98 20 94 % --   24 1552 130/74 97.8  F (36.6  C) Oral 98 18 99 % --       Temperatures:  Current - Temp: 97.7  F (36.5  C); Max - Temp  Av.8  F (36.6  C)  Min: 97.7  F (36.5  C)  Max: 98  F (36.7  C)  Respiration range: Resp  Av  Min: 16  Max: 20  Pulse range: Pulse  Av.3  Min: 90  Max: 98  Blood pressure range: Systolic (24hrs), Av , Min:130 , Max:134   ; Diastolic (24hrs), Av, Min:74, Max:80    Pulse oximetry range: SpO2  Av.3 %  Min: 94 %  Max: 99 %  I/O last 3 completed shifts:  In:  [P.O.:1640; I.V.:441]  Out: 2300 [Urine:2300]    Intake/Output Summary (Last 24 hours) at 2024 1510  Last data filed at 2024 1349  Gross per 24 hour   Intake 2081 ml   Output 2300 ml   Net -219 ml     EXAM:  General: awake and alert, NAD, oriented x 3  Head: normocephalic  Neck: unremarkable, no lymphadenopathy   HEENT: oropharynx pink and moist    Heart: Regular rate and rhythm, no murmurs, rubs, or gallops  Lungs: clear to auscultation bilaterally with good air movement throughout  Abdomen: soft, non-tender, no masses or organomegaly  Extremities: trace edema in lower extremities, left lower extremity s/p amputation, right lower extremity as below  Skin shows persistent erythema right lower extremity, mildly improved from admission, requester that boundary be marked today, otherwise unremarkable as visualized.   n            Data:     Reviewed data:  Results for orders placed or performed during the hospital encounter of 06/06/24 (from the past 24 hour(s))   Glucose by meter   Result Value Ref Range    GLUCOSE BY METER POCT 145 (H) 70 - 99 mg/dL   Glucose by meter   Result Value Ref Range    GLUCOSE BY METER POCT 201 (H) 70 - 99 mg/dL   Glucose by meter   Result Value Ref Range    GLUCOSE BY  METER POCT 166 (H) 70 - 99 mg/dL   INR   Result Value Ref Range    INR 5.32 (HH) 0.85 - 1.15   CBC with platelets   Result Value Ref Range    WBC Count 14.3 (H) 4.0 - 11.0 10e3/uL    RBC Count 3.35 (L) 3.80 - 5.20 10e6/uL    Hemoglobin 10.2 (L) 11.7 - 15.7 g/dL    Hematocrit 32.0 (L) 35.0 - 47.0 %    MCV 96 78 - 100 fL    MCH 30.4 26.5 - 33.0 pg    MCHC 31.9 31.5 - 36.5 g/dL    RDW 14.6 10.0 - 15.0 %    Platelet Count 165 150 - 450 10e3/uL   Basic metabolic panel   Result Value Ref Range    Sodium 139 135 - 145 mmol/L    Potassium 3.4 3.4 - 5.3 mmol/L    Chloride 96 (L) 98 - 107 mmol/L    Carbon Dioxide (CO2) 31 (H) 22 - 29 mmol/L    Anion Gap 12 7 - 15 mmol/L    Urea Nitrogen 38.0 (H) 8.0 - 23.0 mg/dL    Creatinine 1.22 (H) 0.51 - 0.95 mg/dL    GFR Estimate 45 (L) >60 mL/min/1.73m2    Calcium 9.3 8.8 - 10.2 mg/dL    Glucose 170 (H) 70 - 99 mg/dL   CRP inflammation   Result Value Ref Range    CRP Inflammation 216.12 (H) <5.00 mg/L   Vancomycin level   Result Value Ref Range    Vancomycin 19.1   ug/mL   Glucose by meter   Result Value Ref Range    GLUCOSE BY METER POCT 169 (H) 70 - 99 mg/dL   Glucose by meter   Result Value Ref Range    GLUCOSE BY METER POCT 186 (H) 70 - 99 mg/dL           Attestation:  I have reviewed today's vital signs, notes, medications, labs and imaging.  Amount of time spent managing this patient today:  45 minutes.

## 2024-06-09 NOTE — PHARMACY-VANCOMYCIN DOSING SERVICE
Pharmacy Vancomycin Note  Date of Service 2024  Patient's  1945   79 year old, female    Indication: Bacteremia and Skin and Soft Tissue Infection  Day of Therapy: 3  Current vancomycin regimen:  1500 mg IV q24h  Current vancomycin monitoring method: AUC  Current vancomycin therapeutic monitoring goal: 400-600 mg*h/L    [employer] Prediction of Current Vancomycin Regimen  Regimen: 1500 mg IV every 24 hours.  Start time: 13:02 on 2024  Exposure target: AUC24 (range)400-600 mg/L.hr   AUC24,ss: 673 mg/L.hr  Probability of AUC24 > 400: 97 %  Ctrough,ss: 22.2 mg/L  Probability of Ctrough,ss > 20: 58 %  Probability of nephrotoxicity (Lodise COSTA ): 21 %      Current estimated CrCl = Estimated Creatinine Clearance: 48.1 mL/min (A) (based on SCr of 1.22 mg/dL (H)).    Creatinine for last 3 days  2024: 11:50 AM Creatinine 1.09 mg/dL  2024:  5:36 AM Creatinine 1.05 mg/dL  2024:  5:46 AM Creatinine 1.28 mg/dL  2024:  5:25 AM Creatinine 1.22 mg/dL    Recent Vancomycin Levels (past 3 days)  2024:  5:25 AM Vancomycin 19.1 ug/mL    Vancomycin IV Administrations (past 72 hours)                     vancomycin (VANCOCIN) 1,500 mg in sodium chloride 0.9 % 250 mL intermittent infusion (mg) 1,500 mg New Bag 24 1302     1,500 mg New Bag 24 1447    vancomycin (VANCOCIN) 1,250 mg in sodium chloride 0.9 % 250 mL intermittent infusion (mg) 1,250 mg New Bag 24 1414                    Nephrotoxins and other renal medications (From now, onward)      Start     Dose/Rate Route Frequency Ordered Stop    24 1400  vancomycin (VANCOCIN) 1,500 mg in sodium chloride 0.9 % 250 mL intermittent infusion         1,500 mg  over 90 Minutes Intravenous EVERY 24 HOURS 24 1341      24 0800  torsemide (DEMADEX) tablet 60 mg        Note to Pharmacy: PTA Sig:Take 2 tablets (40 mg) by mouth daily HOLD if SBP<100  Patient taking differently: Take 3 tablets by mouth daily HOLD if SBP<100       60 mg Oral DAILY 06/06/24 1631                 Contrast Orders - past 72 hours (72h ago, onward)      Start     Dose/Rate Route Frequency Stop    06/07/24 0930  gadobutrol (GADAVIST) injection 11 mL         11 mL Intravenous ONCE              Interpretation of levels and current regimen:  Vancomycin level is reflective of AUC greater than 600    Has serum creatinine changed greater than 50% in last 72 hours: No    Urine output:  good urine output    Renal Function: Stable    [employer] Prediction of Planned New Vancomycin Regimen  Regimen: 1250 mg IV every 24 hours.  Start time: 13:02 on 06/09/2024  Exposure target: AUC24 (range)400-600 mg/L.hr   AUC24,ss: 570 mg/L.hr  Probability of AUC24 > 400: 87 %  Ctrough,ss: 18.8 mg/L  Probability of Ctrough,ss > 20: 46 %  Probability of nephrotoxicity (Lodise COSTA 2009): 15 %      Plan:  Decrease Dose to 1250mg q24h  Vancomycin monitoring method: AUC  Vancomycin therapeutic monitoring goal: 400-600 mg*h/L  Pharmacy will check vancomycin levels as appropriate in 1-3 Days.  Serum creatinine levels will be ordered daily for the first week of therapy and at least twice weekly for subsequent weeks.    Lavon Patel [facility]

## 2024-06-09 NOTE — PLAN OF CARE
Problem: Adult Inpatient Plan of Care  Goal: Plan of Care Review  Description: The Plan of Care Review/Shift note should be completed every shift.  The Outcome Evaluation is a brief statement about your assessment that the patient is improving, declining, or no change.  This information will be displayed automatically on your shift  note.  Outcome: Progressing      DATE & TIME: 6/8 7P-7A    Cognitive Concerns/ Orientation : Alert and oriented but occasionally forgetful   BEHAVIOR & AGGRESSION TOOL COLOR: Green, cooperative but frustrated at times   ABNL VS/O2: VSS on room air  MOBILITY: Not out of bed this shift, repositioned self as tolerated. Does not tolerate laying flat. Does not want anyone touching her legs. 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 Purewick changed. 550 dark ramona urine output between 2300 and 0500.   ABNL LAB/BG: , 166. WBC 14.3 (trend down),  (trend down), Cr 1.22 (trend down). INR 5.32 (trend up)  DRAIN/DEVICES: Intermittent Rochephin and vanco.   SKIN: RLE red, warm, swollen, dressing CDI.  Scabs on left stump. Scattered bruising.   D/C DATE: Pending. Back to assisted vs TCU  OTHER IMPORTANT INFO: May need PICC for abx course

## 2024-06-10 ENCOUNTER — APPOINTMENT (OUTPATIENT)
Dept: GENERAL RADIOLOGY | Facility: CLINIC | Age: 79
DRG: 872 | End: 2024-06-10
Attending: INTERNAL MEDICINE
Payer: COMMERCIAL

## 2024-06-10 VITALS
HEART RATE: 96 BPM | RESPIRATION RATE: 16 BRPM | TEMPERATURE: 97 F | OXYGEN SATURATION: 97 % | DIASTOLIC BLOOD PRESSURE: 74 MMHG | WEIGHT: 253.97 LBS | SYSTOLIC BLOOD PRESSURE: 138 MMHG | BODY MASS INDEX: 40.99 KG/M2

## 2024-06-10 LAB
ANION GAP SERPL CALCULATED.3IONS-SCNC: 13 MMOL/L (ref 7–15)
BUN SERPL-MCNC: 32.4 MG/DL (ref 8–23)
CALCIUM SERPL-MCNC: 9.2 MG/DL (ref 8.8–10.2)
CHLORIDE SERPL-SCNC: 95 MMOL/L (ref 98–107)
CREAT SERPL-MCNC: 1.13 MG/DL (ref 0.51–0.95)
CRP SERPL-MCNC: 136.8 MG/L
DEPRECATED HCO3 PLAS-SCNC: 29 MMOL/L (ref 22–29)
EGFRCR SERPLBLD CKD-EPI 2021: 49 ML/MIN/1.73M2
ERYTHROCYTE [DISTWIDTH] IN BLOOD BY AUTOMATED COUNT: 14.6 % (ref 10–15)
GLUCOSE BLDC GLUCOMTR-MCNC: 142 MG/DL (ref 70–99)
GLUCOSE BLDC GLUCOMTR-MCNC: 161 MG/DL (ref 70–99)
GLUCOSE BLDC GLUCOMTR-MCNC: 192 MG/DL (ref 70–99)
GLUCOSE SERPL-MCNC: 232 MG/DL (ref 70–99)
HCT VFR BLD AUTO: 32.9 % (ref 35–47)
HGB BLD-MCNC: 10.3 G/DL (ref 11.7–15.7)
INR PPP: 4.83 (ref 0.85–1.15)
MCH RBC QN AUTO: 29.9 PG (ref 26.5–33)
MCHC RBC AUTO-ENTMCNC: 31.3 G/DL (ref 31.5–36.5)
MCV RBC AUTO: 96 FL (ref 78–100)
PLATELET # BLD AUTO: 177 10E3/UL (ref 150–450)
POTASSIUM SERPL-SCNC: 3.4 MMOL/L (ref 3.4–5.3)
RBC # BLD AUTO: 3.44 10E6/UL (ref 3.8–5.2)
SODIUM SERPL-SCNC: 137 MMOL/L (ref 135–145)
WBC # BLD AUTO: 10 10E3/UL (ref 4–11)

## 2024-06-10 PROCEDURE — 85027 COMPLETE CBC AUTOMATED: CPT | Performed by: FAMILY MEDICINE

## 2024-06-10 PROCEDURE — 250N000009 HC RX 250: Performed by: INTERNAL MEDICINE

## 2024-06-10 PROCEDURE — 85610 PROTHROMBIN TIME: CPT

## 2024-06-10 PROCEDURE — 86140 C-REACTIVE PROTEIN: CPT | Performed by: FAMILY MEDICINE

## 2024-06-10 PROCEDURE — 250N000013 HC RX MED GY IP 250 OP 250 PS 637: Performed by: FAMILY MEDICINE

## 2024-06-10 PROCEDURE — 272N000579 HC TRAY POWER PICC SOLO 4FR SINGLE LUMEN

## 2024-06-10 PROCEDURE — 999N000065 XR CHEST PORT 1 VIEW

## 2024-06-10 PROCEDURE — 250N000011 HC RX IP 250 OP 636: Mod: JZ | Performed by: FAMILY MEDICINE

## 2024-06-10 PROCEDURE — 36415 COLL VENOUS BLD VENIPUNCTURE: CPT

## 2024-06-10 PROCEDURE — 250N000013 HC RX MED GY IP 250 OP 250 PS 637

## 2024-06-10 PROCEDURE — 36569 INSJ PICC 5 YR+ W/O IMAGING: CPT

## 2024-06-10 PROCEDURE — 80048 BASIC METABOLIC PNL TOTAL CA: CPT | Performed by: FAMILY MEDICINE

## 2024-06-10 PROCEDURE — 99239 HOSP IP/OBS DSCHRG MGMT >30: CPT | Performed by: INTERNAL MEDICINE

## 2024-06-10 RX ORDER — LIDOCAINE 40 MG/G
CREAM TOPICAL
Status: DISCONTINUED | OUTPATIENT
Start: 2024-06-10 | End: 2024-06-10 | Stop reason: HOSPADM

## 2024-06-10 RX ORDER — TORSEMIDE 20 MG/1
60 TABLET ORAL DAILY
Status: ON HOLD | COMMUNITY
Start: 2024-06-10 | End: 2024-07-16

## 2024-06-10 RX ORDER — ACETAMINOPHEN 500 MG
500-1000 TABLET ORAL EVERY 8 HOURS PRN
Status: ON HOLD | DISCHARGE
Start: 2024-06-10 | End: 2024-07-16

## 2024-06-10 RX ORDER — CEFTRIAXONE 2 G/1
2 INJECTION, POWDER, FOR SOLUTION INTRAMUSCULAR; INTRAVENOUS EVERY 24 HOURS
DISCHARGE
Start: 2024-06-10 | End: 2024-06-20

## 2024-06-10 RX ORDER — METHADONE HYDROCHLORIDE 5 MG/1
5 TABLET ORAL 4 TIMES DAILY
COMMUNITY
Start: 2024-06-10 | End: 2024-06-11

## 2024-06-10 RX ADMIN — INSULIN ASPART 2 UNITS: 100 INJECTION, SOLUTION INTRAVENOUS; SUBCUTANEOUS at 08:03

## 2024-06-10 RX ADMIN — METHADONE HYDROCHLORIDE 5 MG: 5 TABLET ORAL at 12:25

## 2024-06-10 RX ADMIN — ASPIRIN 81 MG: 81 TABLET ORAL at 08:02

## 2024-06-10 RX ADMIN — TORSEMIDE 60 MG: 20 TABLET ORAL at 08:02

## 2024-06-10 RX ADMIN — METHADONE HYDROCHLORIDE 5 MG: 5 TABLET ORAL at 06:28

## 2024-06-10 RX ADMIN — CEFTRIAXONE SODIUM 2 G: 2 INJECTION, POWDER, FOR SOLUTION INTRAMUSCULAR; INTRAVENOUS at 12:26

## 2024-06-10 RX ADMIN — Medication 1 TABLET: at 08:02

## 2024-06-10 RX ADMIN — MICONAZOLE NITRATE ANTIFUNGAL POWDER: 2 POWDER TOPICAL at 08:05

## 2024-06-10 RX ADMIN — INSULIN ASPART 1 UNITS: 100 INJECTION, SOLUTION INTRAVENOUS; SUBCUTANEOUS at 12:25

## 2024-06-10 RX ADMIN — LIDOCAINE HYDROCHLORIDE 3 ML: 10 INJECTION, SOLUTION EPIDURAL; INFILTRATION; INTRACAUDAL; PERINEURAL at 14:51

## 2024-06-10 RX ADMIN — METHADONE HYDROCHLORIDE 5 MG: 5 TABLET ORAL at 15:27

## 2024-06-10 ASSESSMENT — ACTIVITIES OF DAILY LIVING (ADL)
ADLS_ACUITY_SCORE: 51
ADLS_ACUITY_SCORE: 49
ADLS_ACUITY_SCORE: 51
ADLS_ACUITY_SCORE: 49
ADLS_ACUITY_SCORE: 51
ADLS_ACUITY_SCORE: 49
ADLS_ACUITY_SCORE: 49
ADLS_ACUITY_SCORE: 51
ADLS_ACUITY_SCORE: 49

## 2024-06-10 NOTE — PROCEDURES
[facility]  Procedure Note         PICC      Linda Loredo  MRN# [id number]   Janna 10, 2024, 2:49 PM Indication: Medication administration           Pause for the cause: Consent for catheter placement procedure signed  Time out completed  Patient ID's verified using two distinct indicators  All necessary equipment is present   Type of line to be used: PICC   Full barrier precautions used: Yes   Skin preparation: Chloraprep   Date of insertion: Janna 10, 2024, 2:49 PM   Device type: Single lumen, valved, 4.0   Catheter brand: Kabongo   Lot number: [id number]   Insertion location: Right cephalic vein   Method of placement: Venipuncture  MST  Ultrasound   Number of attempts: With ultrasound: 1   Without ultrasound: 0   Difficulty threading: No   Midline IV device: Dressing dry and intact   Arm circumference: 33cm       Internal length: 47 cm   Midline visible catheter length: 0 cm   Total catheter length: 47 cm   Tip termination: SVC   Method of verification: Chest x-ray   Midline patency post placement: Positive blood return  Flushes without difficulty  Saline locked   Line flush: Line flush documented on the eMARyes   Placement verified by: Registered Nurse   Catheter placed by: Vernell Fregoso RN   Discontinuation form initiated: No   Patient tolerance: Tolerated well   PICC Insertion Education Complete: Yes      Summary:  This procedure was performed without difficulty and she tolerated the procedure well with no immediate complications.       Recorded by Vernell Fregoso RN    Attestation:  Vernell Fregoso RN     Procedures

## 2024-06-10 NOTE — PLAN OF CARE
"  Problem: Adult Inpatient Plan of Care  Goal: Plan of Care Review  Description: The Plan of Care Review/Shift note should be completed every shift.  The Outcome Evaluation is a brief statement about your assessment that the patient is improving, declining, or no change.  This information will be displayed automatically on your shift  note.  Outcome: Not Progressing  Flowsheets (Taken 6/9/2024 1915)  Outcome Evaluation:   Patient continues to have red, swollen, painful RLE. Dressing had serous drainage   removed old dressing. Cleansed wounds with microklenz spray, patted dry and applied adaptic, ABD pads and kerlix as ordered by WOC RN. Patient does have new large blisters to right lower extremity that are weeping serous drainage.  Pt's sacrum/buttocks are red, blanchable, excoriated   there is a crack in the gluteal crease. Applied Triad paste. Using purwick with chux pad. She moves around in bed and the purwick moves so she has frequent moisture to her skin. Applied Isoflex pump to bed. Refuses to use the ceiling lift to get out of bed due to pain   refuses pillows for side to side repositioning. Pillows under lower extremities to float off of bed. Declines oxycodone for pain due to \"it makes me loopy.\" Taking scheduled methadone. Poor intake of food   and refuses the supplemental drinks.  Plan of Care Reviewed With: patient  Overall Patient Progress: no change  Goal: Patient-Specific Goal (Individualized)  Description: You can add care plan individualizations to a care plan. Examples of Individualization might be:  \"Parent requests to be called daily at 9am for status\", \"I have a hard time hearing out of my right ear\", or \"Do not touch me to wake me up as it startles  me\".  Outcome: Not Progressing  Goal: Absence of Hospital-Acquired Illness or Injury  Outcome: Not Progressing  Intervention: Identify and Manage Fall Risk  Recent Flowsheet Documentation  Taken 6/9/2024 1816 by Monica Boone RN  Safety " Promotion/Fall Prevention:   activity supervised   assistive device/personal items within reach   clutter free environment maintained   lighting adjusted   nonskid shoes/slippers when out of bed  Taken 6/9/2024 1200 by Monica Boone RN  Safety Promotion/Fall Prevention:   activity supervised   assistive device/personal items within reach   clutter free environment maintained   lighting adjusted   nonskid shoes/slippers when out of bed  Intervention: Prevent Skin Injury  Recent Flowsheet Documentation  Taken 6/9/2024 1816 by Monica Boone RN  Body Position: (refuses to use ceiling lift to get out of bed) other (see comments)  Skin Protection:   adhesive use limited   drying agents applied  Device Skin Pressure Protection:   absorbent pad utilized/changed   tubing/devices free from skin contact  Taken 6/9/2024 1200 by Monica Boone RN  Body Position: (refuses to use ceiling lift to get out of bed) other (see comments)  Skin Protection:   adhesive use limited   drying agents applied  Device Skin Pressure Protection:   absorbent pad utilized/changed   tubing/devices free from skin contact  Intervention: Prevent Infection  Recent Flowsheet Documentation  Taken 6/9/2024 1816 by Monica Boone RN  Infection Prevention: hand hygiene promoted  Taken 6/9/2024 1200 by Monica Boone RN  Infection Prevention: hand hygiene promoted  Goal: Optimal Comfort and Wellbeing  Outcome: Not Progressing  Intervention: Provide Person-Centered Care  Recent Flowsheet Documentation  Taken 6/9/2024 1816 by Monica Boone RN  Trust Relationship/Rapport:   care explained   choices provided   thoughts/feelings acknowledged  Taken 6/9/2024 1200 by Monica Boone RN  Trust Relationship/Rapport:   care explained   choices provided   thoughts/feelings acknowledged  Goal: Readiness for Transition of Care  Outcome: Not Progressing     Problem: Risk for Delirium  Goal: Optimal Coping  Outcome: Not  Progressing  Intervention: Optimize Psychosocial Adjustment to Delirium  Recent Flowsheet Documentation  Taken 6/9/2024 1816 by Monica Boone RN  Supportive Measures:   active listening utilized   verbalization of feelings encouraged  Taken 6/9/2024 1200 by Monica Boone RN  Supportive Measures:   active listening utilized   verbalization of feelings encouraged  Goal: Improved Behavioral Control  Outcome: Not Progressing  Intervention: Prevent and Manage Agitation  Recent Flowsheet Documentation  Taken 6/9/2024 1816 by Monica Boone RN  Environment Familiarity/Consistency: daily routine followed  Taken 6/9/2024 1200 by Monica Boone RN  Environment Familiarity/Consistency: daily routine followed  Intervention: Minimize Safety Risk  Recent Flowsheet Documentation  Taken 6/9/2024 1816 by Monica Boone RN  Communication Enhancement Strategies: call light answered in person  Enhanced Safety Measures:   assistive devices when indicated   pain management   patient/family teach back on injury risk   review medications for side effects with activity  Trust Relationship/Rapport:   care explained   choices provided   thoughts/feelings acknowledged  Taken 6/9/2024 1200 by Monica Boone RN  Communication Enhancement Strategies: call light answered in person  Enhanced Safety Measures:   assistive devices when indicated   pain management   patient/family teach back on injury risk   review medications for side effects with activity  Trust Relationship/Rapport:   care explained   choices provided   thoughts/feelings acknowledged  Goal: Improved Attention and Thought Clarity  Outcome: Not Progressing  Intervention: Maximize Cognitive Function  Recent Flowsheet Documentation  Taken 6/9/2024 1816 by Monica Boone RN  Sensory Stimulation Regulation:   care clustered   television on  Reorientation Measures: clock in view  Taken 6/9/2024 1200 by Monica Boone RN  Sensory Stimulation  Regulation:   care clustered   television on  Reorientation Measures: clock in view  Goal: Improved Sleep  Outcome: Not Progressing     Problem: Skin or Soft Tissue Infection  Goal: Absence of Infection Signs and Symptoms  Outcome: Not Progressing  Intervention: Minimize and Manage Infection Progression  Recent Flowsheet Documentation  Taken 6/9/2024 1816 by Monica Boone RN  Infection Prevention: hand hygiene promoted  Isolation Precautions: contact precautions maintained  Taken 6/9/2024 1200 by Monica Boone RN  Infection Prevention: hand hygiene promoted  Isolation Precautions: contact precautions maintained     Problem: Pain Acute  Goal: Optimal Pain Control and Function  Outcome: Not Progressing  Intervention: Prevent or Manage Pain  Recent Flowsheet Documentation  Taken 6/9/2024 1816 by Monica Boone RN  Sensory Stimulation Regulation:   care clustered   television on  Bowel Elimination Promotion:   adequate fluid intake promoted   ambulation promoted  Medication Review/Management:   medications reviewed   high-risk medications identified  Taken 6/9/2024 1200 by Monica Boone RN  Sensory Stimulation Regulation:   care clustered   television on  Bowel Elimination Promotion:   adequate fluid intake promoted   ambulation promoted  Medication Review/Management:   medications reviewed   high-risk medications identified  Intervention: Optimize Psychosocial Wellbeing  Recent Flowsheet Documentation  Taken 6/9/2024 1816 by Monica Boone RN  Supportive Measures:   active listening utilized   verbalization of feelings encouraged  Taken 6/9/2024 1200 by Monica Boone RN  Supportive Measures:   active listening utilized   verbalization of feelings encouraged   Goal Outcome Evaluation:      Plan of Care Reviewed With: patient    Overall Patient Progress: no changeOverall Patient Progress: no change    Outcome Evaluation: Patient continues to have red, swollen, painful RLE. Dressing  "had serous drainage; removed old dressing. Cleansed wounds with microklenz spray, patted dry and applied adaptic, ABD pads and kerlix as ordered by WOC RN. Patient does have new large blisters to right lower extremity that are weeping serous drainage.  Pt's sacrum/buttocks are red, blanchable, excoriated; there is a crack in the gluteal crease. Applied Triad paste. Using purwick with chux pad. She moves around in bed and the purwick moves so she has frequent moisture to her skin. Applied Isoflex pump to bed. Refuses to use the ceiling lift to get out of bed due to pain; refuses pillows for side to side repositioning. Pillows under lower extremities to float off of bed. Declines oxycodone for pain due to \"it makes me loopy.\" Taking scheduled methadone. Poor intake of food; and refuses the supplemental drinks.      "

## 2024-06-10 NOTE — PROGRESS NOTES
Care Management Discharge Note    Discharge Date: 06/11/2024       Discharge Disposition: Home Care, Assisted Living    Discharge Services: Transportation Services    Discharge DME: None    Discharge Transportation: agency    Private pay costs discussed: transportation costs    Does the patient's insurance plan have a 3 day qualifying hospital stay waiver?  No    PAS Confirmation Code: [id number]  Patient/family educated on Medicare website which has current facility and service quality ratings: no    Education Provided on the Discharge Plan: No  Persons Notified of Discharge Plans: PatientKathleen in admissions at [facility]  Patient/Family in Agreement with the Plan: yes    Handoff Referral Completed: Yes    Additional Information:    Per IDT rounds, MD team states that patient is medically stable for discharge today.      Patient has been accepted at [facility] Phone (Main Phone:[phone] Admissions Phone:[phone] Fax: [phone]) for today.     Met with patient at bedside for discharge planning. She is in agreement with discharge to [facility] and request that  arrange transport.     Spoke with JOSE Kowalski at [facility] and provided discharge update.     PLAN: [facility] today  Transport: [facility] between [id number]    BEN STODDARD RN

## 2024-06-10 NOTE — PLAN OF CARE
DATE & TIME: 6/9 Night    Cognitive Concerns/ Orientation : Alert and oriented   BEHAVIOR & AGGRESSION TOOL COLOR: Green, cooperative but frustrated at times   ABNL VS/O2: VSS on room air   MOBILITY: Refusing out of bed. Weight shifts in bed at times.   PAIN MANAGEMENT: Scheduled methadone given   DIET: Mod Carb, 2 gram sodium  BOWEL/BLADDER: Purewick in place with good urine output. No BM for several days per pt report  ABNL LAB/BG: Labs trending down. BGM checks   DRAIN/DEVICES: Intermittent Rocephin and vanco. Purewick.   SKIN: RLE redness within marked boarder. Several serous filled blisters, occasionally draining. Triad paste applied to excoriated coccyx.   D/C DATE: pending

## 2024-06-10 NOTE — DISCHARGE SUMMARY
[facility]  Hospitalist Discharge Summary      Date of Admission:  6/6/2024  Date of Discharge:  6/10/2024  Discharging Provider: Nick Luciano DO  Discharge Service: Hospitalist Service    Discharge Diagnoses   Cellulitis/myositis of right lower extremity  Severe sepsis without septic shock  Bacteremia/UTI with hematuria  Type 2 diabetes  Chronic right-sided heart failure  Essential hypertension  Paroxysmal atrial fibrillation  Anticoagulation monitoring on warfarin  Chronic stasis  History of left lower extremity potation due to osteomyelitis  Mixed hyperlipidemia  Chronic kidney disease  Fibromyalgia  Primary hypothyroidism  Generalized weakness    Clinically Significant Risk Factors     # DMII: A1C = N/A within past 6 months       Follow-ups Needed After Discharge   Follow-up Appointments     Follow Up and recommended labs and tests      Please follow-up with primary care provider at patient's earliest   convenience.  Please follow-up with recommendations from transitional care unit   provider.            Unresulted Labs Ordered in the Past 30 Days of this Admission       Date and Time Order Name Status Description    6/7/2024  8:18 AM Blood Culture Hand, Left Preliminary     6/7/2024  8:18 AM Blood Culture Hand, Right Preliminary     6/7/2024  1:56 AM Blood Culture Arm, Right Preliminary     6/7/2024  1:56 AM Blood Culture Arm, Right Preliminary     6/6/2024 11:07 AM Blood Culture Peripheral Blood Preliminary         These results will be followed up by primary care provider.    Discharge Disposition   Discharged to home  Condition at discharge: Stable    Hospital Course   Cellulitis/myositis of right lower extremity  Severe sepsis without septic shock due to above   Bacteremia - likely strep - due to above     [facility]. Has chronic venous stasis and diabetic ulcers at baseline. Has home wound nurse 3x/wk. Has had increased swelling in RLE for 1-2m. Developed severe redness and  pain 24hrs PTA. Tmax 100.2. . WBC 19.5 with left shift (18.8). CRP 90. Lactate 4.2 ?  3.9 after 1.5L IVF. Has a hx of LLE amputation due to osteomyelitis from diabetic ulcer and is very concerned she will lose the RLE  - LR 125ml/hr after fluid boluses initially, then saline locked 6/7    - WOC and SW consulted  - admitted on Zosyn 4.5g Every 6 hours and Vancomycin; pharmacy dosed (hx of MRSA in LLE osteomyelitis and in UTIs)    - WBC slightly better but CRP up notably 6/7, appearance unchanged 6/7.     - checking MRI right lower leg 6/7   - Blood cultures from 6/6 positive in 1 of 2 for likely strep by verigene - pending    - switched zosyn to rocephin 2g qd 6/8 in agreement with AMS recommendations and based on urine culture results as below.  Continued vanco.    - WBC and CRP up a bit 6/8, exam slightly improved.  Exam about the same but labs clearly improving 6/9.    - Will likely need 14 day course of IV rocephin from first negative blood culture per AMS, consider placing PICC once repeat cultures negative x 48-72 hours.   Will also need weekly CBC w/ diff, SCr, ALT, AST while on IV antibiotics per AMS.    - repeat blood cultures from 6/7 negative so far, pending      UTI with hematuria  UA with large LE, many bacteria, 55 WBC, >182 RBC, moderate blood. Previous urine culture 9/23/23 grew enterococcus faecalis, MRSA, E. Coli.  - urine culture growing providencia rettgeri - resistant to zosyn/unasyn,  susceptible to rocephin - switched zosyn to rocephin 6/8 as above    - Abx as above     Type 2 diabetes mellitus with complication, with long-term current use of insulin (H)  A1c 9.1 on 4/1/2024. Uncontrolled.   Managed prior to admission with insulin Tresiba 36U Qam, Ozempic 0.5 weekly.  - sliding scale insulin  - Consistent carb diet  - Held Ozempic while inpatient   - initially continued Tresiba, but then held AM 6/8 due to mild hypoglycemia - ok off this so far, reassess 6/10, may be that our consistent  carb diet is significantly different than her home diet.       Chronic right-sided heart failure (H)  Essential hypertension with goal blood pressure less than 140/90  Echo 5/2022 showed EF 69%, mild MR, indeterminate diastolic function.  Managed prior to admission with Torsemide 40, Metolazone 2.5 weekly.  - Continue Torsemide  - Held Metolazone on admission, due for next dose 6/13  - follow weight and I/Os (initial reported weight of 230 clearly inaccurate).        Paroxysmal atrial fibrillation (H)  Anticoagulation monitoring, INR range 2-3  INR 3.33 on admit.  Managed prior to admission with Warfarin and Aspirin 81  - Pharmacy to dose Warfarin  - Continue Aspirin   - INR up to 5.32 6/9 - no active bleeding, pharmacy dosing/holding coumadin - follow for now.       Chronic venous stasis  Hx of LLE amputation due to osteomyelitis   Has chronic venous stasis with chronic non-healing wounds. Gets home wound nurse 3x/wk. 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  - wound care consulted here as above.        Mixed hyperlipidemia  Managed prior to admission with Atorvastatin 40, continue.     Chronic kidney disease  Cr 1.09 on admit (baseline ~1).  - creatinine up slightly 6/8-6/9 but not true GUSTAVO yet - following.       Fibromyalgia  Chronic opioid use  Managed prior to admission with Methadone 5 TID, continued.     Primary hyperparathyroidism (H24)  Ca 10.2 on admit (baseline 10-11).  - stable so far      Generalized weakness  Due to poor baseline now worse with acute infection as above.  Likely will need TCU on discontinue, physical therapy and occupational therapy following.       Consultations This Hospital Stay   PHARMACY TO DOSE VANCO  PHARMACY TO DOSE VANCO  PHARMACY TO DOSE WARFARIN  WOUND OSTOMY CONTINENCE NURSE  IP CONSULT  CARE MANAGEMENT / SOCIAL WORK IP CONSULT  PHARMACY TO DOSE VANCO  PHYSICAL THERAPY ADULT IP CONSULT  OCCUPATIONAL THERAPY ADULT IP  CONSULT  NUTRITION SERVICES ADULT IP CONSULT  WOUND OSTOMY CONTINENCE NURSE  IP CONSULT  PHYSICAL THERAPY ADULT IP CONSULT  OCCUPATIONAL THERAPY ADULT IP CONSULT    Code Status   Full Code    Time Spent on this Encounter   I, Nick Luciano DO, personally saw the patient today and spent greater than 30 minutes discharging this patient.       Nick Luciano DO, S  [facility] SURGICAL  [address] [address]  Phone: [phone]  Fax: [phone]  ______________________________________________________________________       Primary Care Physician   Physician No Ref-Primary    Discharge Orders      General info for SNF    Length of Stay Estimate: Short Term Care: Estimated # of Days <30  Condition at Discharge: Improving  Level of care:skilled   Rehabilitation Potential: Good  Admission H&P remains valid and up-to-date: Yes  Recent Chemotherapy: N/A  Use Nursing Home Standing Orders: Yes     Follow Up and recommended labs and tests    Please follow-up with primary care provider at patient's earliest convenience.  Please follow-up with recommendations from transitional care unit provider.     Reason for your hospital stay    Patient was admitted to the hospital for further evaluation and treatment of right lower extremity cellulitis/myositis.     Activity - Up with nursing assistance     Full Code    Per electronic medical record     Physical Therapy Adult Consult    Evaluate and treat as clinically indicated.    Reason: Deconditioning     Occupational Therapy Adult Consult    Evaluate and treat as clinically indicated.    Reason: Deconditioning     Contact Isolation     Diet    Follow this diet upon discharge: Orders Placed This Encounter      Snacks/Supplements Adult: Ensure Max Protein (bariatric); With Meals      Combination Diet Moderate Consistent Carb (60 g CHO per Meal) Diet; 2 gm NA Diet       Significant Results and Procedures   Results for orders placed or performed  during the hospital encounter of 06/06/24   MR Tibia Fibula Lower Leg Right wo Contrast    Narrative    MR right leg without contrast 6/7/2024 9:46 AM    Techniques: Multiplanar multisequence imaging of the right leg was  obtained without  administration of intra-articular or intravenous  contrast using routing protocol.    History: cellulitis with skin wounds, markedly elevated CRP, rule out  osteomyelitis or other deeper infection    Comparison: None    Exam was terminated due to patient discomfort before intravenous  contrast was administered.    Findings:    Suboptimal evaluation of the bone and soft tissues owing to the large  field-of-view.    Diffuse subcutaneous and intramuscular edema throughout the right leg.  There is no evidence of a loculated fluid collection.    Evaluation of the osseous structures demonstrates normal bone marrow  signal pattern. No evidence of marrow signal replacement to suggest  osteomyelitis. No acute fracture or focal osseous lesion. Degenerative  changes of the medial compartment of the right knee.    Multi compartmental fatty infiltration of the muscles of the lower  leg.      Impression    Impression:  Limited exam owing to the large field-of-view and early termination  due to patient discomfort.    1. Diffuse subcutaneous and intramuscular edema throughout the right  lower leg, compatible with cellulitis and myositis in the provided  clinical context.    2. No marrow signal abnormality to suggest osteomyelitis.    I have personally reviewed the examination and initial interpretation  and I agree with the findings.    APRIL RODRIGUEZ MD         SYSTEM ID:  [id number]       Discharge Medications   Current Discharge Medication List        START taking these medications    Details   cefTRIAXone (ROCEPHIN) 2 GM vial Inject 2 g into the vein every 24 hours for 10 days    Associated Diagnoses: Cellulitis of right lower extremity      melatonin 1 MG TABS tablet Take 1 tablet (1 mg) by  mouth nightly as needed for sleep    Associated Diagnoses: Insomnia, unspecified type      miconazole (MICATIN) 2 % external powder Apply topically 2 times daily    Associated Diagnoses: Tinea corporis           CONTINUE these medications which have CHANGED    Details   acetaminophen (TYLENOL) 500 MG tablet Take 1-2 tablets (500-1,000 mg) by mouth every 8 hours as needed for mild pain    Associated Diagnoses: Pain      insulin degludec (TRESIBA) 200 UNIT/ML pen Inject 34 Units Subcutaneous every morning Hold for blood glucose less than 100    Associated Diagnoses: Type 2 diabetes mellitus with diabetic polyneuropathy, with long-term current use of insulin (H)      methadone (DOLOPHINE) 5 MG tablet Take 1 tablet (5 mg) by mouth 4 times daily 0700, 1100, 1500, 2000    Associated Diagnoses: Opioid dependence, uncomplicated (H)      torsemide (DEMADEX) 20 MG tablet Take 3 tablets (60 mg) by mouth daily HOLD if SBP<100    Associated Diagnoses: Lymphedema; Essential hypertension           CONTINUE these medications which have NOT CHANGED    Details   aspirin 81 MG EC tablet Take 1 tablet (81 mg) by mouth daily    Associated Diagnoses: Hx of BKA, left (H)      atorvastatin (LIPITOR) 40 MG tablet Take 1 tablet (40 mg) by mouth every evening    Associated Diagnoses: Hx of BKA, left (H)      JANTOVEN ANTICOAGULANT 3 MG tablet Take 1 tablet by mouth daily      metolazone (ZAROXOLYN) 2.5 MG tablet Take 1 tablet (2.5 mg) by mouth once a week Give on thursdays  Qty: 4 tablet, Refills: 2    Associated Diagnoses: Lymphedema; Weight gain      mineral oil-hydrophilic petrolatum (AQUAPHOR) external ointment Apply to left thigh wound BID    Associated Diagnoses: Skin lesion      multivitamin (CENTRUM SILVER) tablet Take 1 tablet by mouth daily      ondansetron (ZOFRAN ODT) 4 MG ODT tab Take 1 tablet (4 mg) by mouth every 6 hours as needed for nausea or vomiting    Associated Diagnoses: Nausea      polyethylene glycol (MIRALAX) 17  GM/Dose powder Take 17 g by mouth 2 times daily as needed for constipation    Associated Diagnoses: Drug-induced constipation; Slow transit constipation      senna-docusate (SENOKOT-S/PERICOLACE) 8.6-50 MG tablet Take 1 tablet by mouth 2 times daily as needed for constipation    Associated Diagnoses: Slow transit constipation; Drug-induced constipation           STOP taking these medications       OZEMPIC, 0.25 OR 0.5 MG/DOSE, 2 MG/3ML pen Comments:   Reason for Stopping:             Allergies   Allergies   Allergen Reactions    Prednisone Nausea and Vomiting    Morphine Nausea and Vomiting    Morphine [Fumaric Acid] Nausea and Vomiting    Nitrofurantoin      Per nursing home

## 2024-06-11 ENCOUNTER — DOCUMENTATION ONLY (OUTPATIENT)
Dept: GERIATRICS | Facility: CLINIC | Age: 79
End: 2024-06-11
Payer: COMMERCIAL

## 2024-06-11 ENCOUNTER — TELEPHONE (OUTPATIENT)
Dept: GERIATRICS | Facility: CLINIC | Age: 79
End: 2024-06-11
Payer: COMMERCIAL

## 2024-06-11 ENCOUNTER — PATIENT OUTREACH (OUTPATIENT)
Dept: CARE COORDINATION | Facility: CLINIC | Age: 79
End: 2024-06-11
Payer: COMMERCIAL

## 2024-06-11 ENCOUNTER — LAB REQUISITION (OUTPATIENT)
Dept: LAB | Facility: CLINIC | Age: 79
End: 2024-06-11
Payer: COMMERCIAL

## 2024-06-11 DIAGNOSIS — I48.20 CHRONIC ATRIAL FIBRILLATION, UNSPECIFIED (H): ICD-10-CM

## 2024-06-11 LAB — BACTERIA BLD CULT: NO GROWTH

## 2024-06-11 RX ORDER — METHADONE HYDROCHLORIDE 5 MG/1
5 TABLET ORAL 4 TIMES DAILY
Qty: 8 TABLET | Refills: 0 | Status: SHIPPED | OUTPATIENT
Start: 2024-06-11 | End: 2024-06-12

## 2024-06-11 NOTE — PROGRESS NOTES
[facility] DISCHARGE NOTE    Patient discharged to transitional care unit at 5:30 PM via cart. Accompanied by other: BLS staff. Discharge instructions reviewed with patient, opportunity offered to ask questions. Prescriptions sent to patients preferred pharmacy. All belongings sent with patient.  Patient updated daughter.    Blanquita Lehman RN

## 2024-06-11 NOTE — TELEPHONE ENCOUNTER
[facility] Triage Nurse Telephone Encounter    Provider: Vickie Ventura NP  Facility: [facility] Type:  TCU    Caller: Rickey  Call Back Number: [phone]    Allergies:    Allergies   Allergen Reactions    Prednisone Nausea and Vomiting    Morphine Nausea and Vomiting    Morphine [Fumaric Acid] Nausea and Vomiting    Nitrofurantoin      Per nursing home        Reason for call: Nurse called to report that patient is a new admit to TCU from yesterday.  Patient was suppose to have an INR drawn today but order was never sent to lab company.  Coumadin held last evening due to INR being 4.83 in hospital.  Nurse wants to know what Coumadin dose to give tonight and if they should put in the INR to be done tomorrow.      Verbal Order/Direction given by Provider: Hold Coumadin tonight and recheck INR tomorrow.      Provider giving Order:  Naomi Armenta CNP    Verbal Order given to: Rickey Cheatham RN

## 2024-06-11 NOTE — PROGRESS NOTES
Connected Care Resource Center: [facility]    Background: Transitional Care Management program identified per system criteria and reviewed by [facility] team for possible outreach.    Assessment: Upon chart review, [facility] Team member will not proceed with patient outreach related to this episode of Transitional Care Management program due to reason below:    Patient has discharged to a Memory Care, Long-term Care, Assisted Living or Group Home where patient is receiving on-site support with their daily cares, including support with hospital follow up plan.    Plan: Transitional Care Management episode addressed appropriately per reason noted above.      Katie Villarreal  Community Health Worker  [facility], [facility]  [phone]      *Connected Care Resource Team does NOT follow patient ongoing. Referrals are identified based on internal discharge reports and the outreach is to ensure patient has an understanding of their discharge instructions.

## 2024-06-11 NOTE — PLAN OF CARE
Occupational Therapy Discharge Summary    Reason for therapy discharge:    Discharged to transitional care facility.    Progress towards therapy goal(s). See goals on Care Plan in [facility] electronic health record for goal details.  Goals partially met.  Barriers to achieving goals:   discharge from facility.    Therapy recommendation(s):    Continued therapy is recommended.  Rationale/Recommendations:  Pt would benefit from continued skilled OT services to increase safety and independence with ADL.

## 2024-06-12 ENCOUNTER — LAB REQUISITION (OUTPATIENT)
Dept: LAB | Facility: CLINIC | Age: 79
End: 2024-06-12
Payer: COMMERCIAL

## 2024-06-12 ENCOUNTER — TRANSITIONAL CARE UNIT VISIT (OUTPATIENT)
Dept: GERIATRICS | Facility: CLINIC | Age: 79
End: 2024-06-12
Payer: COMMERCIAL

## 2024-06-12 VITALS
RESPIRATION RATE: 18 BRPM | BODY MASS INDEX: 39.46 KG/M2 | WEIGHT: 245.5 LBS | OXYGEN SATURATION: 93 % | TEMPERATURE: 98.4 F | HEART RATE: 93 BPM | SYSTOLIC BLOOD PRESSURE: 120 MMHG | DIASTOLIC BLOOD PRESSURE: 68 MMHG | HEIGHT: 66 IN

## 2024-06-12 DIAGNOSIS — I50.812 CHRONIC RIGHT-SIDED HEART FAILURE (H): ICD-10-CM

## 2024-06-12 DIAGNOSIS — F33.1 MAJOR DEPRESSIVE DISORDER, RECURRENT EPISODE, MODERATE (H): ICD-10-CM

## 2024-06-12 DIAGNOSIS — L03.115 CELLULITIS OF RIGHT LOWER EXTREMITY: Primary | ICD-10-CM

## 2024-06-12 DIAGNOSIS — I89.0 LYMPHEDEMA: ICD-10-CM

## 2024-06-12 DIAGNOSIS — R78.81 BACTEREMIA: ICD-10-CM

## 2024-06-12 DIAGNOSIS — N18.32 CHRONIC KIDNEY DISEASE, STAGE 3B (H): ICD-10-CM

## 2024-06-12 DIAGNOSIS — I48.91 UNSPECIFIED ATRIAL FIBRILLATION (H): ICD-10-CM

## 2024-06-12 DIAGNOSIS — Z79.4 TYPE 2 DIABETES MELLITUS WITH DIABETIC POLYNEUROPATHY, WITH LONG-TERM CURRENT USE OF INSULIN (H): ICD-10-CM

## 2024-06-12 DIAGNOSIS — G89.4 CHRONIC PAIN SYNDROME: ICD-10-CM

## 2024-06-12 DIAGNOSIS — E66.01 MORBID OBESITY (H): ICD-10-CM

## 2024-06-12 DIAGNOSIS — Z79.01 LONG TERM (CURRENT) USE OF ANTICOAGULANTS: ICD-10-CM

## 2024-06-12 DIAGNOSIS — F11.20 OPIOID DEPENDENCE, UNCOMPLICATED (H): ICD-10-CM

## 2024-06-12 DIAGNOSIS — E11.42 TYPE 2 DIABETES MELLITUS WITH DIABETIC POLYNEUROPATHY, WITH LONG-TERM CURRENT USE OF INSULIN (H): ICD-10-CM

## 2024-06-12 DIAGNOSIS — R53.81 PHYSICAL DECONDITIONING: ICD-10-CM

## 2024-06-12 DIAGNOSIS — I10 ESSENTIAL HYPERTENSION: ICD-10-CM

## 2024-06-12 DIAGNOSIS — E78.5 DYSLIPIDEMIA: ICD-10-CM

## 2024-06-12 DIAGNOSIS — Z89.512 STATUS POST BELOW-KNEE AMPUTATION OF LEFT LOWER EXTREMITY (H): ICD-10-CM

## 2024-06-12 DIAGNOSIS — I48.0 PAROXYSMAL ATRIAL FIBRILLATION (H): ICD-10-CM

## 2024-06-12 PROBLEM — E11.621: Status: RESOLVED | Noted: 2022-05-17 | Resolved: 2024-06-12

## 2024-06-12 PROBLEM — L97.425: Status: RESOLVED | Noted: 2022-05-17 | Resolved: 2024-06-12

## 2024-06-12 LAB
BACTERIA BLD CULT: NO GROWTH

## 2024-06-12 PROCEDURE — 99309 SBSQ NF CARE MODERATE MDM 30: CPT | Performed by: NURSE PRACTITIONER

## 2024-06-12 RX ORDER — METHADONE HYDROCHLORIDE 5 MG/1
5 TABLET ORAL 4 TIMES DAILY
Qty: 40 TABLET | Refills: 0 | Status: SHIPPED | OUTPATIENT
Start: 2024-06-12 | End: 2024-06-20

## 2024-06-12 NOTE — PROGRESS NOTES
[facility] GERIATRICS    PRIMARY CARE PROVIDER AND CLINIC:  Physician No Ref-Primary, No address on file:  [facility] Currently has [facility], but is not happy with them.  Would like to transfer to [facility]  Chief Complaint   Patient presents with    Hospital F/U      [facility] Medical Record Number:  [id number]  Place of Service where encounter took place:  [facility] (TCU/SNF) [[id number]]    Linda Loredo  is a 79 year old  (1945), admitted to the above facility from  [facility]. Hospital stay 6/6/24 through 6/10/24..   HPI:      78 y/o female with significant past medical hx of T2DM, hx of LLE amputation, fibromyalgia,  chronic venous stasis, paroxysmal afib on warfarin, CHF, CKD, chronic opioid use who presented to hospital on 6/6/2024 with concerns for RLE cellulitis. Redness and swelling rapdily expanded. Initially treated with IV Zosyn, Vancomycin. Urine culture also grew Providencia rettgeri resistant to zosyn.  IV AB switched to Rocephin.   Had some hypoglycemic episodes, Ozempic and Tresiba held.  Resumed Tresiba at hospital discharge. INR elevated, Warfarin held. Creat was up slightly, not true GUSTAVO. Deconditioned and needing IV antibiotics, therefore transferred to this TCU.    Since TCU admission:  Per nursing staff, patient has been resistant and demanding of cares.  She is particularly resistance of having labs drawn due to the challenge of finding veins  She is checking her own blood glucose 3 times daily and reporting to nursing staff the results.  Per patient, her dressing is saturating more frequently than staff is changing it.  She does believe that she is making some progress that the infection is improving her pain is well-managed she has a good appetite she is sleeping fine denies any pain with urination or any issues with her bowels.  Denies cough, chest pain, or shortness of breath.  She  does admit to the stress of repeated infection and fears of losing her other leg.      CODE STATUS/ADVANCE DIRECTIVES DISCUSSION:  Full Code  CPR/Full code   ALLERGIES:   Allergies   Allergen Reactions    Prednisone Nausea and Vomiting    Morphine Nausea and Vomiting    Morphine [Fumaric Acid] Nausea and Vomiting    Nitrofurantoin      Per nursing home      PAST MEDICAL HISTORY:   Past Medical History:   Diagnosis Date    Depressive disorder, not elsewhere classified     Herpes zoster without mention of complication     Hypercalcemia 2007    Mild intermittent asthma     Mixed hyperlipidemia due to type 2 diabetes mellitus (H) 2008    Formatting of this note is different from the original.     22 ASCVD 10 year risk: 37.9%      Last Lipids:  Last Lipids:  Chol: 2021 130   63  HDL: 2021 46  Non-HDL: 2021 84  Chol/HDL Ratio: 2021 2.83  LDL DIRECT: . No results found in past 5 years   LDL CHOLESTEROL (mg/dL)   Date Value   2021 71      22   The 10-year ASCVD risk score ([place] LUIS Jr.    Other urinary incontinence     Type II or unspecified type diabetes mellitus with renal manifestations, uncontrolled(250.42) (H)     Type II or unspecified type diabetes mellitus without mention of complication, not stated as uncontrolled     Unspecified essential hypertension       PAST SURGICAL HISTORY:   has a past surgical history that includes Cholecystectomy; ligation of fallopian tube; Open reduction internal fixation ankle (10/13/11); pinning of left 2nd toe; Incision and drainage foot, combined (Left, 2023); IR Lower Extremity Angiogram Left (10/3/2023); Amputate leg below knee (Left, 10/7/2023); Amputate leg below knee (Left, 10/14/2023); Graft skin split thickness from trunk to head (Left, 2024); and Apply Wound Vac (Left, 2024).  FAMILY HISTORY: family history includes Cancer in her maternal grandmother and paternal grandmother; Diabetes in her sister;  Heart Disease in her father; Hypertension in her mother and sister; Psychotic Disorder in her sister; Respiratory in her sister.  SOCIAL HISTORY:   reports that she has never smoked. She has never used smokeless tobacco. She reports that she does not drink alcohol and does not use drugs.  Patient's living condition: lives in an assisted living facility    Post Discharge Medication Reconciliation Status:   MED REC REQUIRED  Post Medication Reconciliation Status: discharge medications reconciled, continue medications without change       Current Outpatient Medications   Medication Sig Dispense Refill    methadone (DOLOPHINE) 5 MG tablet Take 1 tablet (5 mg) by mouth 4 times daily 0700, 1100, 1500, 2000 40 tablet 0    acetaminophen (TYLENOL) 500 MG tablet Take 1-2 tablets (500-1,000 mg) by mouth every 8 hours as needed for mild pain      aspirin 81 MG EC tablet Take 1 tablet (81 mg) by mouth daily      atorvastatin (LIPITOR) 40 MG tablet Take 1 tablet (40 mg) by mouth every evening      cefTRIAXone (ROCEPHIN) 2 GM vial Inject 2 g into the vein every 24 hours for 10 days      insulin degludec (TRESIBA) 200 UNIT/ML pen Inject 34 Units Subcutaneous every morning Hold for blood glucose less than 100      melatonin 1 MG TABS tablet Take 1 tablet (1 mg) by mouth nightly as needed for sleep      metolazone (ZAROXOLYN) 2.5 MG tablet Take 1 tablet (2.5 mg) by mouth once a week Give on thursdays 4 tablet 2    miconazole (MICATIN) 2 % external powder Apply topically 2 times daily      mineral oil-hydrophilic petrolatum (AQUAPHOR) external ointment Apply to left thigh wound BID      multivitamin (CENTRUM SILVER) tablet Take 1 tablet by mouth daily      ondansetron (ZOFRAN ODT) 4 MG ODT tab Take 1 tablet (4 mg) by mouth every 6 hours as needed for nausea or vomiting      polyethylene glycol (MIRALAX) 17 GM/Dose powder Take 17 g by mouth 2 times daily as needed for constipation      senna-docusate (SENOKOT-S/PERICOLACE) 8.6-50 MG  "tablet Take 1 tablet by mouth 2 times daily as needed for constipation      torsemide (DEMADEX) 20 MG tablet Take 3 tablets (60 mg) by mouth daily HOLD if SBP<100       No current facility-administered medications for this visit.       ROS:  10 point ROS of systems including Constitutional, Eyes, Respiratory, Cardiovascular, Gastroenterology, Genitourinary, Integumentary, Musculoskeletal, Psychiatric were all negative except for pertinent positives noted in my HPI.    Vitals:  /68   Pulse 93   Temp 98.4  F (36.9  C)   Resp 18   Ht 1.676 m (5' 6\")   Wt 111.4 kg (245 lb 8 oz)   SpO2 93%   BMI 39.62 kg/m    Exam:  GENERAL APPEARANCE:  Alert, in no distress, cooperative  RESP:  lungs clear to auscultation , no respiratory distress  CV:  regular rate and rhythm, no murmur, rub, or gallop, lymphedema  ABDOMEN:  obese, bowel sounds normal, soft, non-tender  M/S:   Generalized weakness  SKIN:  Large area of red cellulitis encompassing entire right leg and up to the buttocks. decreased shade of red compared to previous photos.  Redness decreasing from demarcation   NEURO:   NFEDE  PSYCH:  memory impaired , stated that she has been here for 1 week when actually she is only been here 1 day, anxious    Lab/Diagnostic data:  Recent labs in [facility] reviewed by me today.  and Most Recent 3 CBC's:  Recent Labs   Lab Test 06/10/24  0600 06/09/24  0525 06/08/24  0546   WBC 10.0 14.3* 21.1*   HGB 10.3* 10.2* 10.5*   MCV 96 96 96    165 161     Most Recent 3 BMP's:  Recent Labs   Lab Test 06/10/24  1146 06/10/24  0757 06/10/24  0600 06/09/24  0753 06/09/24  0525 06/08/24  0600 06/08/24  0546   NA  --   --  137  --  139  --  139   POTASSIUM  --   --  3.4  --  3.4  --  4.3   CHLORIDE  --   --  95*  --  96*  --  97*   CO2  --   --  29  --  31*  --  35*   BUN  --   --  32.4*  --  38.0*  --  41.1*   CR  --   --  1.13*  --  1.22*  --  1.28*   ANIONGAP  --   --  13  --  12  --  7   ZAKIA  --   --  9.2  --  9.3  --  9.5   GLC " 161* 192* 232*   < > 170*   < > 90    < > = values in this interval not displayed.     Most Recent 3 INR's:  Recent Labs   Lab Test 06/10/24  0600 06/09/24  0525 06/08/24  0546   INR 4.83* 5.32* 5.01*     7-Day Micro Results       Collected Updated Procedure Result Status      06/07/2024 1057 06/12/2024 1501 Blood Culture Hand, Left [[id number]]    Blood from Hand, Left    Final result Component Value   Culture No Growth               06/07/2024 1052 06/12/2024 1501 Blood Culture Hand, Right [[id number]]    Blood from Hand, Right    Final result Component Value   Culture No Growth               06/07/2024 0536 06/12/2024 0917 Blood Culture Arm, Right [[id number]]    Blood from Arm, Right    Final result Component Value   Culture No Growth               06/07/2024 0536 06/12/2024 0917 Blood Culture Arm, Right [[id number]]    Blood from Arm, Right    Final result Component Value   Culture No Growth               06/06/2024 1414 06/08/2024 0256 Urine Culture [[id number]]    (Abnormal)   Urine, Catheter    Final result Component Value   Culture >100,000 CFU/mL Providencia rettgeri        Susceptibility        Providencia rettgeri      ENDY      Ampicillin  Resistant  [1]       Ampicillin/ Sulbactam 16 ug/mL Intermediate      Cefazolin  Resistant  [2]       Cefepime <=1 ug/mL Susceptible      Cefoxitin 8 ug/mL Susceptible      Ceftazidime <=1 ug/mL Susceptible      Ceftriaxone <=1 ug/mL Susceptible      Ciprofloxacin <=0.25 ug/mL Susceptible      Gentamicin <=1 ug/mL Susceptible      Levofloxacin 0.25 ug/mL Susceptible      Nitrofurantoin  Resistant  [1]       Piperacillin/Tazobactam >=128 ug/mL Resistant      Tobramycin <=1 ug/mL Susceptible      Trimethoprim/Sulfamethoxazole <=1/19 ug/mL Susceptible                   [1]  Intrinsically Resistant     [2]  Cefazolin ENDY breakpoints are for the treatment of uncomplicated urinary tract infections. For the treatment of systemic infections, please contact the  laboratory for additional testing.  Intrinsically Resistant                    06/06/2024 1220 06/11/2024 1531 Blood Culture Peripheral Blood [[id number]]    Peripheral Blood    Final result Component Value   Culture No Growth               06/06/2024 1150 06/09/2024 1031 Blood Culture Peripheral Blood [[id number]]    (Abnormal)   Peripheral Blood    Final result Component Value   Culture Positive on the 1st day of incubation    Streptococcus dysgalactiae (Group G Streptococcus)    1 of 2 bottles        Susceptibility        Streptococcus dysgalactiae (Group G Streptococcus)      ENDY      Ampicillin <=0.06 ug/mL Susceptible      Cefotaxime <=0.25 ug/mL Susceptible      Ceftriaxone <=0.25 ug/mL Susceptible      Clindamycin <=0.06 ug/mL Susceptible      Meropenem <=0.06 ug/mL Susceptible      Penicillin <=0.03 ug/mL Susceptible      Vancomycin 0.25 ug/mL Susceptible                           06/06/2024 1150 06/07/2024 0352 Verigene GP Panel [[id number]]    (Abnormal)   Peripheral Blood    Final result Component Value   Staphylococcus species Not Detected   Staphylococcus aureus Not Detected   Staphylococcus epidermidis Not Detected   Staphylococcus lugdunensis Not Detected   Enterococcus faecalis Not Detected   Enterococcus faecium Not Detected   Streptococcus species Detected   Positive for Streptococcus species other than Streptococcus pneumococcus, Streptococcus anginosus group, Streptococcus pyogenes and Streptococcus agalactiae. Performed using [employer] multiplex nucleic acid test. Final identification and antimicrobial susceptibility testing will be verified by standard methods.   Streptococcus agalactiae Not Detected   Streptococcus anginosus group Not Detected   Streptococcus pneumoniae Not Detected   Streptococcus pyogenes Not Detected   Listeria species Not Detected                  Most Recent Hemoglobin A1c:  Recent Labs   Lab Test 03/01/24  1319   A1C 9.9*     Most Recent ESR & CRP:  Recent Labs    Lab Test 06/10/24  0600   CRPI 136.80*       ASSESSMENT/PLAN:    Cellulitis of right lower extremity  Bacteremia  Physical deconditioning  Cellulitis improving  Blood cultures returning negative for growth  Continue IV Rocephin through June 20  Obtaining weekly labs may be challenging requested that nursing staff use PICC line  Physical and Occupational Therapy for conditioning    Type 2 diabetes mellitus with diabetic polyneuropathy, with long-term current use of insulin (H)  A1c in April 2024 was 9.1  Remains off of Ozempic but Tresiba was restarted at hospital discharge  Blood sugars:  fasting 140-160's, others mid 200s  Encouraged to follow constant carbohydrate diet  Educated on importance of maintaining good diabetic control for preservation of limbs and kidneys, reduction in infections  Continue to monitor and adjust as indicated    Chronic right-sided heart failure (H)  Lymphedema  Echocardiogram in May 2022 showed ejection fraction of 69% with mild mitral regurgitation and indeterminate diastolic function  No current weights on file  Significant lower extremity edema  Follow daily weights  Lymphedema therapist recommended after wound drainage is less  Continue on torsemide 40 mg daily and metolazone 2.5 mg weekly      Essential hypertension  Blood pressures variable, from 120s-150s/60-70's  Continue to monitor and adjust as indicated    Paroxysmal atrial fibrillation (H)  Long term (current) use of anticoagulants  INR was elevated at 3.3 on hospital admission and 5.32-day before hospital discharge.  Warfarin was held.  Lab was not notified to obtain an INR yesterday and today patient allowed them 1 chance and she was unable to obtain blood.  Patient refused any other attempts.  Patient did mention that she has a point-of-care INR machine at home  Discussed with nursing staff regarding either obtaining that or using PICC line for blood samples as the PICC line is not anticoagulated    Status post  below-knee amputation of left lower extremity (H)  Morbid obesity (H)  Causes increased workload for nursing staff  Patient did have her son bring in her personal  wheelchair that fits her well    Dyslipidemia  Chronic  Continue with prior to admission atorvastatin 40 mg daily    Chronic kidney disease, stage 3b (H)  Baseline creatinine around 1.  Up slightly during hospitalization  Continue to follow  Avoid nephrotoxins  Renally dose medications  Will check labs next week    Major depressive disorder, recurrent episode, moderate (H)  Historic  Not on any medications  Appears to be struggling with recurrent infection and fear of losing her other leg.   Recommend in-house psych to follow    Chronic pain syndrome  Opioid dependence, uncomplicated (H)  Chronic pain  Followed by pain clinic  Has been on methadone for years, dose recently reduced to 5 mg 4 times daily.   Continue prior to admission regime  Follow with pain clinic at TCU discharge  - methadone (DOLOPHINE) 5 MG tablet; Take 1 tablet (5 mg) by mouth 4 times daily 0700, 1100, 1500, 2000        Orders:  INR on 6/13--check if family can bring in point of care INR machine.  OR OK to draw blood from PIC if blood draw by phelbotomist unsuccessful  CBC, BMP, CRP in 1 week -- OK to draw from PIC  Wound care--can increase frequency if saturating through dressings in < 24 hours.   In house psych referral for adjustment    Electronically signed by:  MOE Fletcher CNP

## 2024-06-12 NOTE — LETTER
6/12/2024      Linad Loredo  [address]  [facility] [address]        [facility] GERIATR[facility]    PRIMARY CARE PROVIDER AND CLINIC:  Physician No Ref-Primary, No address on file:  [facility] Currently has [facility], but is not happy with them.  Would like to transfer to [facility]  Chief Complaint   Patient presents with     Hospital F/U      [facility] Medical Record Number:  [id number]  Place of Service where encounter took place:  [facility] (TCU/SNF) [[id number]]    Linda Loredo  is a 79 year old  (1945), admitted to the above facility from  [facility]. Hospital stay 6/6/24 through 6/10/24..   HPI:      80 y/o female with significant past medical hx of T2DM, hx of LLE amputation, fibromyalgia,  chronic venous stasis, paroxysmal afib on warfarin, CHF, CKD, chronic opioid use who presented to hospital on 6/6/2024 with concerns for RLE cellulitis. Redness and swelling rapdily expanded. Initially treated with IV Zosyn, Vancomycin. Urine culture also grew Providencia rettgeri resistant to zosyn.  IV AB switched to Rocephin.   Had some hypoglycemic episodes, Ozempic and Tresiba held.  Resumed Tresiba at hospital discharge. INR elevated, Warfarin held. Creat was up slightly, not true GUSTAVO. Deconditioned and needing IV antibiotics, therefore transferred to this TCU.    Since TCU admission:  Per nursing staff, patient has been resistant and demanding of cares.  She is particularly resistance of having labs drawn due to the challenge of finding veins  She is checking her own blood glucose 3 times daily and reporting to nursing staff the results.  Per patient, her dressing is saturating more frequently than staff is changing it.  She does believe that she is making some progress that the infection is improving her pain is well-managed she has a good appetite she is sleeping fine denies any pain with  urination or any issues with her bowels.  Denies cough, chest pain, or shortness of breath.  She does admit to the stress of repeated infection and fears of losing her other leg.      CODE STATUS/ADVANCE DIRECTIVES DISCUSSION:  Full Code  CPR/Full code   ALLERGIES:   Allergies   Allergen Reactions     Prednisone Nausea and Vomiting     Morphine Nausea and Vomiting     Morphine [Fumaric Acid] Nausea and Vomiting     Nitrofurantoin      Per nursing home      PAST MEDICAL HISTORY:   Past Medical History:   Diagnosis Date     Depressive disorder, not elsewhere classified      Herpes zoster without mention of complication      Hypercalcemia 2007     Mild intermittent asthma      Mixed hyperlipidemia due to type 2 diabetes mellitus (H) 2008    Formatting of this note is different from the original.     22 ASCVD 10 year risk: 37.9%      Last Lipids:  Last Lipids:  Chol: 2021 130   63  HDL: 2021 46  Non-HDL: 2021 84  Chol/HDL Ratio: 2021 2.83  LDL DIRECT: . No results found in past 5 years   LDL CHOLESTEROL (mg/dL)   Date Value   2021 71      22   The 10-year ASCVD risk score ([place] LUIS Jr.     Other urinary incontinence      Type II or unspecified type diabetes mellitus with renal manifestations, uncontrolled(250.42) (H)      Type II or unspecified type diabetes mellitus without mention of complication, not stated as uncontrolled      Unspecified essential hypertension       PAST SURGICAL HISTORY:   has a past surgical history that includes Cholecystectomy; ligation of fallopian tube; Open reduction internal fixation ankle (10/13/11); pinning of left 2nd toe; Incision and drainage foot, combined (Left, 2023); IR Lower Extremity Angiogram Left (10/3/2023); Amputate leg below knee (Left, 10/7/2023); Amputate leg below knee (Left, 10/14/2023); Graft skin split thickness from trunk to head (Left, 2024); and Apply Wound Vac (Left, 2024).  FAMILY HISTORY:  family history includes Cancer in her maternal grandmother and paternal grandmother; Diabetes in her sister; Heart Disease in her father; Hypertension in her mother and sister; Psychotic Disorder in her sister; Respiratory in her sister.  SOCIAL HISTORY:   reports that she has never smoked. She has never used smokeless tobacco. She reports that she does not drink alcohol and does not use drugs.  Patient's living condition: lives in an assisted living facility    Post Discharge Medication Reconciliation Status:   MED REC REQUIRED  Post Medication Reconciliation Status: discharge medications reconciled, continue medications without change       Current Outpatient Medications   Medication Sig Dispense Refill     methadone (DOLOPHINE) 5 MG tablet Take 1 tablet (5 mg) by mouth 4 times daily 0700, 1100, 1500, 2000 40 tablet 0     acetaminophen (TYLENOL) 500 MG tablet Take 1-2 tablets (500-1,000 mg) by mouth every 8 hours as needed for mild pain       aspirin 81 MG EC tablet Take 1 tablet (81 mg) by mouth daily       atorvastatin (LIPITOR) 40 MG tablet Take 1 tablet (40 mg) by mouth every evening       cefTRIAXone (ROCEPHIN) 2 GM vial Inject 2 g into the vein every 24 hours for 10 days       insulin degludec (TRESIBA) 200 UNIT/ML pen Inject 34 Units Subcutaneous every morning Hold for blood glucose less than 100       melatonin 1 MG TABS tablet Take 1 tablet (1 mg) by mouth nightly as needed for sleep       metolazone (ZAROXOLYN) 2.5 MG tablet Take 1 tablet (2.5 mg) by mouth once a week Give on thursdays 4 tablet 2     miconazole (MICATIN) 2 % external powder Apply topically 2 times daily       mineral oil-hydrophilic petrolatum (AQUAPHOR) external ointment Apply to left thigh wound BID       multivitamin (CENTRUM SILVER) tablet Take 1 tablet by mouth daily       ondansetron (ZOFRAN ODT) 4 MG ODT tab Take 1 tablet (4 mg) by mouth every 6 hours as needed for nausea or vomiting       polyethylene glycol (MIRALAX) 17 GM/Dose  "powder Take 17 g by mouth 2 times daily as needed for constipation       senna-docusate (SENOKOT-S/PERICOLACE) 8.6-50 MG tablet Take 1 tablet by mouth 2 times daily as needed for constipation       torsemide (DEMADEX) 20 MG tablet Take 3 tablets (60 mg) by mouth daily HOLD if SBP<100       No current facility-administered medications for this visit.       ROS:  10 point ROS of systems including Constitutional, Eyes, Respiratory, Cardiovascular, Gastroenterology, Genitourinary, Integumentary, Musculoskeletal, Psychiatric were all negative except for pertinent positives noted in my HPI.    Vitals:  /68   Pulse 93   Temp 98.4  F (36.9  C)   Resp 18   Ht 1.676 m (5' 6\")   Wt 111.4 kg (245 lb 8 oz)   SpO2 93%   BMI 39.62 kg/m    Exam:  GENERAL APPEARANCE:  Alert, in no distress, cooperative  RESP:  lungs clear to auscultation , no respiratory distress  CV:  regular rate and rhythm, no murmur, rub, or gallop, lymphedema  ABDOMEN:  obese, bowel sounds normal, soft, non-tender  M/S:   Generalized weakness  SKIN:  Large area of red cellulitis encompassing entire right leg and up to the buttocks. decreased shade of red compared to previous photos.  Redness decreasing from demarcation   NEURO:   NFEDE  PSYCH:  memory impaired , stated that she has been here for 1 week when actually she is only been here 1 day, anxious    Lab/Diagnostic data:  Recent labs in [facility] reviewed by me today.  and Most Recent 3 CBC's:  Recent Labs   Lab Test 06/10/24  0600 06/09/24  0525 06/08/24  0546   WBC 10.0 14.3* 21.1*   HGB 10.3* 10.2* 10.5*   MCV 96 96 96    165 161     Most Recent 3 BMP's:  Recent Labs   Lab Test 06/10/24  1146 06/10/24  0757 06/10/24  0600 06/09/24  0753 06/09/24  0525 06/08/24  0600 06/08/24  0546   NA  --   --  137  --  139  --  139   POTASSIUM  --   --  3.4  --  3.4  --  4.3   CHLORIDE  --   --  95*  --  96*  --  97*   CO2  --   --  29  --  31*  --  35*   BUN  --   --  32.4*  --  38.0*  --  41.1*   CR  " --   --  1.13*  --  1.22*  --  1.28*   ANIONGAP  --   --  13  --  12  --  7   ZAKIA  --   --  9.2  --  9.3  --  9.5   * 192* 232*   < > 170*   < > 90    < > = values in this interval not displayed.     Most Recent 3 INR's:  Recent Labs   Lab Test 06/10/24  0600 06/09/24  0525 06/08/24  0546   INR 4.83* 5.32* 5.01*     7-Day Micro Results       Collected Updated Procedure Result Status      06/07/2024 1057 06/12/2024 1501 Blood Culture Hand, Left [[id number]]    Blood from Hand, Left    Final result Component Value   Culture No Growth               06/07/2024 1052 06/12/2024 1501 Blood Culture Hand, Right [[id number]]    Blood from Hand, Right    Final result Component Value   Culture No Growth               06/07/2024 0536 06/12/2024 0917 Blood Culture Arm, Right [[id number]]    Blood from Arm, Right    Final result Component Value   Culture No Growth               06/07/2024 0536 06/12/2024 0917 Blood Culture Arm, Right [[id number]]    Blood from Arm, Right    Final result Component Value   Culture No Growth               06/06/2024 1414 06/08/2024 0256 Urine Culture [[id number]]    (Abnormal)   Urine, Catheter    Final result Component Value   Culture >100,000 CFU/mL Providencia rettgeri        Susceptibility        Providencia rettgeri      ENDY      Ampicillin  Resistant  [1]       Ampicillin/ Sulbactam 16 ug/mL Intermediate      Cefazolin  Resistant  [2]       Cefepime <=1 ug/mL Susceptible      Cefoxitin 8 ug/mL Susceptible      Ceftazidime <=1 ug/mL Susceptible      Ceftriaxone <=1 ug/mL Susceptible      Ciprofloxacin <=0.25 ug/mL Susceptible      Gentamicin <=1 ug/mL Susceptible      Levofloxacin 0.25 ug/mL Susceptible      Nitrofurantoin  Resistant  [1]       Piperacillin/Tazobactam >=128 ug/mL Resistant      Tobramycin <=1 ug/mL Susceptible      Trimethoprim/Sulfamethoxazole <=1/19 ug/mL Susceptible                   [1]  Intrinsically Resistant     [2]  Cefazolin ENDY breakpoints are for the  treatment of uncomplicated urinary tract infections. For the treatment of systemic infections, please contact the laboratory for additional testing.  Intrinsically Resistant                    06/06/2024 1220 06/11/2024 1531 Blood Culture Peripheral Blood [[id number]]    Peripheral Blood    Final result Component Value   Culture No Growth               06/06/2024 1150 06/09/2024 1031 Blood Culture Peripheral Blood [[id number]]    (Abnormal)   Peripheral Blood    Final result Component Value   Culture Positive on the 1st day of incubation    Streptococcus dysgalactiae (Group G Streptococcus)    1 of 2 bottles        Susceptibility        Streptococcus dysgalactiae (Group G Streptococcus)      ENDY      Ampicillin <=0.06 ug/mL Susceptible      Cefotaxime <=0.25 ug/mL Susceptible      Ceftriaxone <=0.25 ug/mL Susceptible      Clindamycin <=0.06 ug/mL Susceptible      Meropenem <=0.06 ug/mL Susceptible      Penicillin <=0.03 ug/mL Susceptible      Vancomycin 0.25 ug/mL Susceptible                           06/06/2024 1150 06/07/2024 0352 Verigene GP Panel [[id number]]    (Abnormal)   Peripheral Blood    Final result Component Value   Staphylococcus species Not Detected   Staphylococcus aureus Not Detected   Staphylococcus epidermidis Not Detected   Staphylococcus lugdunensis Not Detected   Enterococcus faecalis Not Detected   Enterococcus faecium Not Detected   Streptococcus species Detected   Positive for Streptococcus species other than Streptococcus pneumococcus, Streptococcus anginosus group, Streptococcus pyogenes and Streptococcus agalactiae. Performed using [employer] multiplex nucleic acid test. Final identification and antimicrobial susceptibility testing will be verified by standard methods.   Streptococcus agalactiae Not Detected   Streptococcus anginosus group Not Detected   Streptococcus pneumoniae Not Detected   Streptococcus pyogenes Not Detected   Listeria species Not Detected                  Most  Recent Hemoglobin A1c:  Recent Labs   Lab Test 03/01/24  1319   A1C 9.9*     Most Recent ESR & CRP:  Recent Labs   Lab Test 06/10/24  0600   CRPI 136.80*       ASSESSMENT/PLAN:    Cellulitis of right lower extremity  Bacteremia  Physical deconditioning  Cellulitis improving  Blood cultures returning negative for growth  Continue IV Rocephin through June 20  Obtaining weekly labs may be challenging requested that nursing staff use PICC line  Physical and Occupational Therapy for conditioning    Type 2 diabetes mellitus with diabetic polyneuropathy, with long-term current use of insulin (H)  A1c in April 2024 was 9.1  Remains off of Ozempic but Tresiba was restarted at hospital discharge  Blood sugars:  fasting 140-160's, others mid 200s  Encouraged to follow constant carbohydrate diet  Educated on importance of maintaining good diabetic control for preservation of limbs and kidneys, reduction in infections  Continue to monitor and adjust as indicated    Chronic right-sided heart failure (H)  Lymphedema  Echocardiogram in May 2022 showed ejection fraction of 69% with mild mitral regurgitation and indeterminate diastolic function  No current weights on file  Significant lower extremity edema  Follow daily weights  Lymphedema therapist recommended after wound drainage is less  Continue on torsemide 40 mg daily and metolazone 2.5 mg weekly      Essential hypertension  Blood pressures variable, from 120s-150s/60-70's  Continue to monitor and adjust as indicated    Paroxysmal atrial fibrillation (H)  Long term (current) use of anticoagulants  INR was elevated at 3.3 on hospital admission and 5.32-day before hospital discharge.  Warfarin was held.  Lab was not notified to obtain an INR yesterday and today patient allowed them 1 chance and she was unable to obtain blood.  Patient refused any other attempts.  Patient did mention that she has a point-of-care INR machine at home  Discussed with nursing staff regarding either  obtaining that or using PICC line for blood samples as the PICC line is not anticoagulated    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)  Morbid obesity (H)  Causes increased workload for nursing staff  Patient did have her son bring in her personal  wheelchair that fits her well    Dyslipidemia  Chronic  Continue with prior to admission atorvastatin 40 mg daily    Chronic kidney disease, stage 3b (H)  Baseline creatinine around 1.  Up slightly during hospitalization  Continue to follow  Avoid nephrotoxins  Renally dose medications  Will check labs next week    Major depressive disorder, recurrent episode, moderate (H)  Historic  Not on any medications  Appears to be struggling with recurrent infection and fear of losing her other leg.   Recommend in-house psych to follow    Chronic pain syndrome  Opioid dependence, uncomplicated (H)  Chronic pain  Followed by pain clinic  Has been on methadone for years, dose recently reduced to 5 mg 4 times daily.   Continue prior to admission regime  Follow with pain clinic at TCU discharge  - methadone (DOLOPHINE) 5 MG tablet; Take 1 tablet (5 mg) by mouth 4 times daily 0700, 1100, 1500, 2000        Orders:  INR on 6/13--check if family can bring in point of care INR machine.  OR OK to draw blood from PIC if blood draw by phelbotomist unsuccessful  CBC, BMP, CRP in 1 week -- OK to draw from PIC  Wound care--can increase frequency if saturating through dressings in < 24 hours.   In house psych referral for adjustment    Electronically signed by:  MOE Fletcher CNP                   Sincerely,        MOE Fletcher CNP

## 2024-06-12 NOTE — PROGRESS NOTES
[facility] GERIATRICS    PRIMARY CARE PROVIDER AND CLINIC:  Physician No Ref-Primary, No address on file***  Chief Complaint   Patient presents with    Hospital F/U      [facility] Medical Record Number:  [id number]  Place of Service where encounter took place:  [facility] (TCU/SNF) [[id number]]    Linda Loredo  is a 79 year old  (1945), admitted to the above facility from  [facility]. Hospital stay 24 through 6/10/24..   HPI:    ***    CODE STATUS/ADVANCE DIRECTIVES DISCUSSION:  Full Code  CPR/Full code   ALLERGIES:   Allergies   Allergen Reactions    Prednisone Nausea and Vomiting    Morphine Nausea and Vomiting    Morphine [Fumaric Acid] Nausea and Vomiting    Nitrofurantoin      Per nursing home      PAST MEDICAL HISTORY:   Past Medical History:   Diagnosis Date    Depressive disorder, not elsewhere classified     Herpes zoster without mention of complication     Hypercalcemia 2007    Mild intermittent asthma     Mixed hyperlipidemia due to type 2 diabetes mellitus (H) 2008    Formatting of this note is different from the original.     22 ASCVD 10 year risk: 37.9%      Last Lipids:  Last Lipids:  Chol: 2021 130   63  HDL: 2021 46  Non-HDL: 2021 84  Chol/HDL Ratio: 2021 2.83  LDL DIRECT: . No results found in past 5 years   LDL CHOLESTEROL (mg/dL)   Date Value   2021 71      22   The 10-year ASCVD risk score ([place] LUIS Jr.    Other urinary incontinence     Type II or unspecified type diabetes mellitus with renal manifestations, uncontrolled(250.42) (H)     Type II or unspecified type diabetes mellitus without mention of complication, not stated as uncontrolled     Unspecified essential hypertension       PAST SURGICAL HISTORY:   has a past surgical history that includes Cholecystectomy; ligation of fallopian tube; Open reduction internal fixation ankle (10/13/11); pinning of left 2nd toe;  Incision and drainage foot, combined (Left, 9/26/2023); IR Lower Extremity Angiogram Left (10/3/2023); Amputate leg below knee (Left, 10/7/2023); Amputate leg below knee (Left, 10/14/2023); Graft skin split thickness from trunk to head (Left, 1/2/2024); and Apply Wound Vac (Left, 1/2/2024).  FAMILY HISTORY: family history includes Cancer in her maternal grandmother and paternal grandmother; Diabetes in her sister; Heart Disease in her father; Hypertension in her mother and sister; Psychotic Disorder in her sister; Respiratory in her sister.  SOCIAL HISTORY:   reports that she has never smoked. She has never used smokeless tobacco. She reports that she does not drink alcohol and does not use drugs.  Patient's living condition: lives in an assisted living facility    Post Discharge Medication Reconciliation Status:   MED REC REQUIRED{TIP  Click the link below to document or use med rec list, use list to pull in response :890634}  Post Medication Reconciliation Status: {MED REC LIST:791836}       Current Outpatient Medications   Medication Sig Dispense Refill    acetaminophen (TYLENOL) 500 MG tablet Take 1-2 tablets (500-1,000 mg) by mouth every 8 hours as needed for mild pain      aspirin 81 MG EC tablet Take 1 tablet (81 mg) by mouth daily      atorvastatin (LIPITOR) 40 MG tablet Take 1 tablet (40 mg) by mouth every evening      cefTRIAXone (ROCEPHIN) 2 GM vial Inject 2 g into the vein every 24 hours for 10 days      insulin degludec (TRESIBA) 200 UNIT/ML pen Inject 34 Units Subcutaneous every morning Hold for blood glucose less than 100      JANTOVEN ANTICOAGULANT 3 MG tablet Take 1 tablet by mouth daily      melatonin 1 MG TABS tablet Take 1 tablet (1 mg) by mouth nightly as needed for sleep      methadone (DOLOPHINE) 5 MG tablet Take 1 tablet (5 mg) by mouth 4 times daily 0700, 1100, 1500, 2000 8 tablet 0    metolazone (ZAROXOLYN) 2.5 MG tablet Take 1 tablet (2.5 mg) by mouth once a week Give on thursdays 4  "tablet 2    miconazole (MICATIN) 2 % external powder Apply topically 2 times daily      mineral oil-hydrophilic petrolatum (AQUAPHOR) external ointment Apply to left thigh wound BID      multivitamin (CENTRUM SILVER) tablet Take 1 tablet by mouth daily      ondansetron (ZOFRAN ODT) 4 MG ODT tab Take 1 tablet (4 mg) by mouth every 6 hours as needed for nausea or vomiting      polyethylene glycol (MIRALAX) 17 GM/Dose powder Take 17 g by mouth 2 times daily as needed for constipation      senna-docusate (SENOKOT-S/PERICOLACE) 8.6-50 MG tablet Take 1 tablet by mouth 2 times daily as needed for constipation      torsemide (DEMADEX) 20 MG tablet Take 3 tablets (60 mg) by mouth daily HOLD if SBP<100       No current facility-administered medications for this visit.       ROS:  {ROS FGS:469001}    Vitals:  /68   Pulse 93   Temp 98.4  F (36.9  C)   Resp 18   Ht 1.676 m (5' 6\")   Wt 111.4 kg (245 lb 8 oz)   SpO2 93%   BMI 39.62 kg/m    Exam:  {Nursing home physical exam :855371}    Lab/Diagnostic data:  {fgslab:788776}    ASSESSMENT/PLAN:    {FGS DX2:279105}    Orders:  {fgsorders:516795}  ***    Electronically signed by:  Teddy Morris ***                "

## 2024-06-13 ENCOUNTER — TELEPHONE (OUTPATIENT)
Dept: GERIATRICS | Facility: CLINIC | Age: 79
End: 2024-06-13
Payer: COMMERCIAL

## 2024-06-13 DIAGNOSIS — I48.0 PAROXYSMAL ATRIAL FIBRILLATION (H): Primary | ICD-10-CM

## 2024-06-13 NOTE — TELEPHONE ENCOUNTER
INR via point of care home machine=2.1.  Consulted with [facility] pharmacist who recommended resuming previous dosing  3mg Mondays and Thursdays  2.5mg AOD  Recheck INR 1/17

## 2024-06-14 NOTE — PROGRESS NOTES
"[facility] GERIATRICS    PRIMARY CARE PROVIDER AND CLINIC:  Physician No Ref-Primary, No address on file  Chief Complaint   Patient presents with    Hospital F/U      [place] Medical Record Number:  [id number]  Place of Service where encounter took place:  [facility] (TCU/SNF) [4000]    Linda Loredo  is a 79 year old  (1945), admitted to the above facility from  [facility]. Hospital stay 6/6/24 through 6/10/24..   HPI:    As per DNP's note:\"80 y/o female with significant past medical hx of T2DM, hx of LLE amputation, fibromyalgia,  chronic venous stasis, paroxysmal afib on warfarin, CHF, CKD, chronic opioid use who presented to hospital on 6/6/2024 with concerns for RLE cellulitis. Redness and swelling rapidly expanded. Initially treated with IV Zosyn, Vancomycin. Urine culture also grew Providencia rettgeri resistant to zosyn.  IV AB switched to Rocephin.   Had some hypoglycemic episodes, Ozempic and Tresiba held.  Resumed Tresiba at hospital discharge. INR elevated, Warfarin held. Creat was up slightly, not true GUSTAVO. Deconditioned and needing IV antibiotics, therefore transferred to this TCU.\"      TODAY:  -RLE cellulitis on Rocephin:     -Rehab: reports going to start.     -Pain: was on 25 mg of methadone, total per day, but now the dose  to 20 mg per day  ( 5 mg qid) for the last few months.   Pt reports pain in her left arm wondering if she pulled a muscles. Patient has pain with movement her left arm.   ================================================================================  CODE STATUS/ADVANCE DIRECTIVES DISCUSSION:  Full Code    ALLERGIES:   Allergies   Allergen Reactions    Prednisone Nausea and Vomiting    Morphine Nausea and Vomiting    Morphine [Fumaric Acid] Nausea and Vomiting    Nitrofurantoin      Per nursing home      PAST MEDICAL HISTORY:   Past Medical History:   Diagnosis Date    Depressive disorder, not elsewhere " classified     Herpes zoster without mention of complication     Hypercalcemia 2007    Mild intermittent asthma     Mixed hyperlipidemia due to type 2 diabetes mellitus (H) 2008    Formatting of this note is different from the original.     22 ASCVD 10 year risk: 37.9%      Last Lipids:  Last Lipids:  Chol: 2021 130   63  HDL: 2021 46  Non-HDL: 2021 84  Chol/HDL Ratio: 2021 2.83  LDL DIRECT: . No results found in past 5 years   LDL CHOLESTEROL (mg/dL)   Date Value   2021 71      22   The 10-year ASCVD risk score (Teddyjanye SMITH Jr.    Other urinary incontinence     Type II or unspecified type diabetes mellitus with renal manifestations, uncontrolled(250.42) (H)     Type II or unspecified type diabetes mellitus without mention of complication, not stated as uncontrolled     Unspecified essential hypertension       PAST SURGICAL HISTORY:   has a past surgical history that includes Cholecystectomy; ligation of fallopian tube; Open reduction internal fixation ankle (10/13/11); pinning of left 2nd toe; Incision and drainage foot, combined (Left, 2023); IR Lower Extremity Angiogram Left (10/3/2023); Amputate leg below knee (Left, 10/7/2023); Amputate leg below knee (Left, 10/14/2023); Graft skin split thickness from trunk to head (Left, 2024); and Apply Wound Vac (Left, 2024).  FAMILY HISTORY: family history includes Cancer in her maternal grandmother and paternal grandmother; Diabetes in her sister; Heart Disease in her father; Hypertension in her mother and sister; Psychotic Disorder in her sister; Respiratory in her sister.  SOCIAL HISTORY:   reports that she has never smoked. She has never used smokeless tobacco. She reports that she does not drink alcohol and does not use drugs.  Patient's living condition: lives in an assisted living facility    Post Discharge Medication Reconciliation Status:   MED REC REQUIRED  Post Medication Reconciliation  Status: medication reconcilation previously completed during another office visit       Current Outpatient Medications   Medication Sig Dispense Refill    acetaminophen (TYLENOL) 500 MG tablet Take 1-2 tablets (500-1,000 mg) by mouth every 8 hours as needed for mild pain      aspirin 81 MG EC tablet Take 1 tablet (81 mg) by mouth daily      atorvastatin (LIPITOR) 40 MG tablet Take 1 tablet (40 mg) by mouth every evening      cefTRIAXone (ROCEPHIN) 2 GM vial Inject 2 g into the vein every 24 hours for 10 days      insulin degludec (TRESIBA) 200 UNIT/ML pen Inject 34 Units Subcutaneous every morning Hold for blood glucose less than 100      melatonin 1 MG TABS tablet Take 1 tablet (1 mg) by mouth nightly as needed for sleep      methadone (DOLOPHINE) 5 MG tablet Take 1 tablet (5 mg) by mouth 4 times daily 0700, 1100, 1500, 2000 40 tablet 0    metolazone (ZAROXOLYN) 2.5 MG tablet Take 1 tablet (2.5 mg) by mouth once a week Give on thursdays 4 tablet 2    miconazole (MICATIN) 2 % external powder Apply topically 2 times daily      mineral oil-hydrophilic petrolatum (AQUAPHOR) external ointment Apply to left thigh wound BID      multivitamin (CENTRUM SILVER) tablet Take 1 tablet by mouth daily      ondansetron (ZOFRAN ODT) 4 MG ODT tab Take 1 tablet (4 mg) by mouth every 6 hours as needed for nausea or vomiting      polyethylene glycol (MIRALAX) 17 GM/Dose powder Take 17 g by mouth 2 times daily as needed for constipation      senna-docusate (SENOKOT-S/PERICOLACE) 8.6-50 MG tablet Take 1 tablet by mouth 2 times daily as needed for constipation      torsemide (DEMADEX) 20 MG tablet Take 3 tablets (60 mg) by mouth daily HOLD if SBP<100       No current facility-administered medications for this visit.       ROS:  10 point ROS of systems including Constitutional, Eyes, Respiratory, Cardiovascular, Gastroenterology, Genitourinary, Integumentary, Musculoskeletal, Psychiatric were all negative except for pertinent positives  "noted in my HPI.    Vitals:  /81   Pulse 92   Temp 97.7  F (36.5  C)   Resp 18   Ht 1.676 m (5' 6\")   Wt 104.3 kg (230 lb)   SpO2 96%   BMI 37.12 kg/m    Exam:  GENERAL APPEARANCE:  in no distress,   RESP:  Unlabored breathing. CTA b/l.   CV:  S1S2 audible, regular HR, no murmur appreciated.   ABDOMEN:  soft, NT/ND, BS audible.   M/S: Left BKA. Edema touts left hand/forearm and elbow area, milder over left arm and shoulder area. Warmth to touch mainly in hand/forearm and elbow area. ROM limited due to pain.   SKIN:  PICC line RUE. Wrinkled skin over left foot. Mid right leg covered with Kerlix. . Peeled dry skin over right thigh.   NEURO:   No NFD appreciated on observation.   PSYCH:  affect and mood normal    Lab/Diagnostic data: Reviewed in the chart and EHR.        ASSESSMENT/PLAN:  -----------------------------  Cellulitis of right lower extremity  History of bacteremia  - tolerating rocephin. Lab weekly. Followed by ID.   -     Chronic atrial fibrillation (H)  Long term (current) use of anticoagulants  -was supratherapeutic last week. Challenged obtaining blood sample. PharmD consulted.   - CVR, not on meds.   - 3 mg on Mon/Thu, 2.5 mg AOD. Today INR is 7.0.   - will hold on coumadin on 6/17, 18 and repeat on 6/19.       Chronic right-sided heart failure (H)  Essential hypertension  Lymphedema  - RLE dry and wrinkled, enmanuel dry skin.   -  appears compensated. Continue current POC.   - routine follow up    Type 2 diabetes mellitus with diabetic polyneuropathy, with long-term current use of insulin (H)   A1C 9.9 03/01/2024    A1C 8.7 01/02/2024    A1C 7.6 12/08/2023   - uncontrolled. Had hypoglycemia while IP, Ozempic held.  ON Degludec. Continue current POC  - BG is monitored closely at TCU.     CKD3a GFR 45-59 ml/min (H)  -- Avoid nephrotoxic  medications. Renally dose medications. Monitor electrolytes, and dehydration status      Status post below-knee amputation of left lower extremity  11/2023 " secondary to OM of foot(H)  Physical deconditioning  Chronic pain syndrome  Opioid (Methadone) dependent, uncomplicated  Neuropathy  - has a contract with [facility], [facility] [place].  started rehab program, making a progress, continue until desired goal is achieved.     LUE edema, query tenosynovitis/tendonitis  -affecting ROM.  - will order USG  - will start naproxen 500 mg bid x 5 days. Take with food.       Orders:  -  USG LUE  - Naproxen 500 mg bid x 5 days. Take with food.   - hold coumadin, recheck on 6/19        Electronically signed by:  Marely Raygoza MD

## 2024-06-17 ENCOUNTER — TRANSITIONAL CARE UNIT VISIT (OUTPATIENT)
Dept: GERIATRICS | Facility: CLINIC | Age: 79
End: 2024-06-17
Payer: COMMERCIAL

## 2024-06-17 ENCOUNTER — LAB REQUISITION (OUTPATIENT)
Dept: LAB | Facility: CLINIC | Age: 79
End: 2024-06-17
Payer: COMMERCIAL

## 2024-06-17 VITALS
OXYGEN SATURATION: 96 % | RESPIRATION RATE: 18 BRPM | SYSTOLIC BLOOD PRESSURE: 137 MMHG | TEMPERATURE: 97.7 F | HEIGHT: 66 IN | WEIGHT: 230 LBS | BODY MASS INDEX: 36.96 KG/M2 | DIASTOLIC BLOOD PRESSURE: 81 MMHG | HEART RATE: 92 BPM

## 2024-06-17 DIAGNOSIS — I48.20 CHRONIC ATRIAL FIBRILLATION (H): ICD-10-CM

## 2024-06-17 DIAGNOSIS — I10 ESSENTIAL HYPERTENSION: ICD-10-CM

## 2024-06-17 DIAGNOSIS — R60.0 EDEMA OF LEFT UPPER ARM: ICD-10-CM

## 2024-06-17 DIAGNOSIS — I89.0 LYMPHEDEMA: ICD-10-CM

## 2024-06-17 DIAGNOSIS — I50.812 CHRONIC RIGHT-SIDED HEART FAILURE (H): ICD-10-CM

## 2024-06-17 DIAGNOSIS — Z89.512 STATUS POST BELOW-KNEE AMPUTATION OF LEFT LOWER EXTREMITY (H): ICD-10-CM

## 2024-06-17 DIAGNOSIS — L03.115 CELLULITIS OF RIGHT LOWER EXTREMITY: Primary | ICD-10-CM

## 2024-06-17 DIAGNOSIS — Z79.01 LONG TERM (CURRENT) USE OF ANTICOAGULANTS: ICD-10-CM

## 2024-06-17 DIAGNOSIS — Z87.898 HISTORY OF BACTEREMIA: ICD-10-CM

## 2024-06-17 DIAGNOSIS — N18.31 STAGE 3A CHRONIC KIDNEY DISEASE (H): ICD-10-CM

## 2024-06-17 DIAGNOSIS — E11.42 TYPE 2 DIABETES MELLITUS WITH DIABETIC POLYNEUROPATHY, WITH LONG-TERM CURRENT USE OF INSULIN (H): ICD-10-CM

## 2024-06-17 DIAGNOSIS — R53.81 PHYSICAL DECONDITIONING: ICD-10-CM

## 2024-06-17 DIAGNOSIS — F11.20 OPIOID DEPENDENCE, UNCOMPLICATED (H): ICD-10-CM

## 2024-06-17 DIAGNOSIS — L03.90 CELLULITIS, UNSPECIFIED: ICD-10-CM

## 2024-06-17 DIAGNOSIS — Z79.4 TYPE 2 DIABETES MELLITUS WITH DIABETIC POLYNEUROPATHY, WITH LONG-TERM CURRENT USE OF INSULIN (H): ICD-10-CM

## 2024-06-17 PROCEDURE — 99305 1ST NF CARE MODERATE MDM 35: CPT | Performed by: FAMILY MEDICINE

## 2024-06-17 NOTE — LETTER
" 6/17/2024      Linda Loredo  [address] [address]        [facility] GERIATRICS    PRIMARY CARE PROVIDER AND CLINIC:  Physician No Ref-Primary, No address on file  Chief Complaint   Patient presents with     Hospital F/U      [place] Medical Record Number:  [id number]  Place of Service where encounter took place:  [facility] (TCU/SNF) [4000]    Linda Loredo  is a 79 year old  (1945), admitted to the above facility from  [facility]. Hospital stay 6/6/24 through 6/10/24..   HPI:    As per DNP's note:\"78 y/o female with significant past medical hx of T2DM, hx of LLE amputation, fibromyalgia,  chronic venous stasis, paroxysmal afib on warfarin, CHF, CKD, chronic opioid use who presented to hospital on 6/6/2024 with concerns for RLE cellulitis. Redness and swelling rapidly expanded. Initially treated with IV Zosyn, Vancomycin. Urine culture also grew Providencia rettgeri resistant to zosyn.  IV AB switched to Rocephin.   Had some hypoglycemic episodes, Ozempic and Tresiba held.  Resumed Tresiba at hospital discharge. INR elevated, Warfarin held. Creat was up slightly, not true GUSTAVO. Deconditioned and needing IV antibiotics, therefore transferred to this TCU.\"      TODAY:  -RLE cellulitis on Rocephin:     -Rehab: reports going to start.     -Pain: was on 25 mg of methadone, total per day, but now the dose  to 20 mg per day  ( 5 mg qid) for the last few months.   Pt reports pain in her left arm wondering if she pulled a muscles. Patient has pain with movement her left arm.   ================================================================================  CODE STATUS/ADVANCE DIRECTIVES DISCUSSION:  Full Code    ALLERGIES:   Allergies   Allergen Reactions     Prednisone Nausea and Vomiting     Morphine Nausea and Vomiting     Morphine [Fumaric Acid] Nausea and Vomiting     Nitrofurantoin      Per nursing home      PAST " MEDICAL HISTORY:   Past Medical History:   Diagnosis Date     Depressive disorder, not elsewhere classified      Herpes zoster without mention of complication      Hypercalcemia 2007     Mild intermittent asthma      Mixed hyperlipidemia due to type 2 diabetes mellitus (H) 2008    Formatting of this note is different from the original.     22 ASCVD 10 year risk: 37.9%      Last Lipids:  Last Lipids:  Chol: 2021 130   63  HDL: 2021 46  Non-HDL: 2021 84  Chol/HDL Ratio: 2021 2.83  LDL DIRECT: . No results found in past 5 years   LDL CHOLESTEROL (mg/dL)   Date Value   2021 71      22   The 10-year ASCVD risk score (Teddyjaney SMITH Jr.     Other urinary incontinence      Type II or unspecified type diabetes mellitus with renal manifestations, uncontrolled(250.42) (H)      Type II or unspecified type diabetes mellitus without mention of complication, not stated as uncontrolled      Unspecified essential hypertension       PAST SURGICAL HISTORY:   has a past surgical history that includes Cholecystectomy; ligation of fallopian tube; Open reduction internal fixation ankle (10/13/11); pinning of left 2nd toe; Incision and drainage foot, combined (Left, 2023); IR Lower Extremity Angiogram Left (10/3/2023); Amputate leg below knee (Left, 10/7/2023); Amputate leg below knee (Left, 10/14/2023); Graft skin split thickness from trunk to head (Left, 2024); and Apply Wound Vac (Left, 2024).  FAMILY HISTORY: family history includes Cancer in her maternal grandmother and paternal grandmother; Diabetes in her sister; Heart Disease in her father; Hypertension in her mother and sister; Psychotic Disorder in her sister; Respiratory in her sister.  SOCIAL HISTORY:   reports that she has never smoked. She has never used smokeless tobacco. She reports that she does not drink alcohol and does not use drugs.  Patient's living condition: lives in an assisted living  facility    Post Discharge Medication Reconciliation Status:   MED REC REQUIRED  Post Medication Reconciliation Status: medication reconcilation previously completed during another office visit       Current Outpatient Medications   Medication Sig Dispense Refill     acetaminophen (TYLENOL) 500 MG tablet Take 1-2 tablets (500-1,000 mg) by mouth every 8 hours as needed for mild pain       aspirin 81 MG EC tablet Take 1 tablet (81 mg) by mouth daily       atorvastatin (LIPITOR) 40 MG tablet Take 1 tablet (40 mg) by mouth every evening       cefTRIAXone (ROCEPHIN) 2 GM vial Inject 2 g into the vein every 24 hours for 10 days       insulin degludec (TRESIBA) 200 UNIT/ML pen Inject 34 Units Subcutaneous every morning Hold for blood glucose less than 100       melatonin 1 MG TABS tablet Take 1 tablet (1 mg) by mouth nightly as needed for sleep       methadone (DOLOPHINE) 5 MG tablet Take 1 tablet (5 mg) by mouth 4 times daily 0700, 1100, 1500, 2000 40 tablet 0     metolazone (ZAROXOLYN) 2.5 MG tablet Take 1 tablet (2.5 mg) by mouth once a week Give on thursdays 4 tablet 2     miconazole (MICATIN) 2 % external powder Apply topically 2 times daily       mineral oil-hydrophilic petrolatum (AQUAPHOR) external ointment Apply to left thigh wound BID       multivitamin (CENTRUM SILVER) tablet Take 1 tablet by mouth daily       ondansetron (ZOFRAN ODT) 4 MG ODT tab Take 1 tablet (4 mg) by mouth every 6 hours as needed for nausea or vomiting       polyethylene glycol (MIRALAX) 17 GM/Dose powder Take 17 g by mouth 2 times daily as needed for constipation       senna-docusate (SENOKOT-S/PERICOLACE) 8.6-50 MG tablet Take 1 tablet by mouth 2 times daily as needed for constipation       torsemide (DEMADEX) 20 MG tablet Take 3 tablets (60 mg) by mouth daily HOLD if SBP<100       No current facility-administered medications for this visit.       ROS:  10 point ROS of systems including Constitutional, Eyes, Respiratory, Cardiovascular,  "Gastroenterology, Genitourinary, Integumentary, Musculoskeletal, Psychiatric were all negative except for pertinent positives noted in my HPI.    Vitals:  /81   Pulse 92   Temp 97.7  F (36.5  C)   Resp 18   Ht 1.676 m (5' 6\")   Wt 104.3 kg (230 lb)   SpO2 96%   BMI 37.12 kg/m    Exam:  GENERAL APPEARANCE:  in no distress,   RESP:  Unlabored breathing. CTA b/l.   CV:  S1S2 audible, regular HR, no murmur appreciated.   ABDOMEN:  soft, NT/ND, BS audible.   M/S: Left BKA. Edema touts left hand/forearm and elbow area, milder over left arm and shoulder area. Warmth to touch mainly in hand/forearm and elbow area. ROM limited due to pain.   SKIN:  PICC line RUE. Wrinkled skin over left foot. Mid right leg covered with Kerlix. . Peeled dry skin over right thigh.   NEURO:   No NFD appreciated on observation.   PSYCH:  affect and mood normal    Lab/Diagnostic data: Reviewed in the chart and EHR.        ASSESSMENT/PLAN:  -----------------------------  Cellulitis of right lower extremity  History of bacteremia  - tolerating rocephin. Lab weekly. Followed by ID.   -     Chronic atrial fibrillation (H)  Long term (current) use of anticoagulants  -was supratherapeutic last week. Challenged obtaining blood sample. PharmD consulted.   - CVR, not on meds.   - 3 mg on Mon/Thu, 2.5 mg AOD. Today INR is 7.0.   - will hold on coumadin on 6/17, 18 and repeat on 6/19.       Chronic right-sided heart failure (H)  Essential hypertension  Lymphedema  - RLE dry and wrinkled, enmanuel dry skin.   -  appears compensated. Continue current POC.   - routine follow up    Type 2 diabetes mellitus with diabetic polyneuropathy, with long-term current use of insulin (H)   A1C 9.9 03/01/2024    A1C 8.7 01/02/2024    A1C 7.6 12/08/2023   - uncontrolled. Had hypoglycemia while IP, Ozempic held.  ON Degludec. Continue current POC  - BG is monitored closely at TCU.     CKD3a GFR 45-59 ml/min (H)  -- Avoid nephrotoxic  medications. Renally dose " medications. Monitor electrolytes, and dehydration status      Status post below-knee amputation of left lower extremity  11/2023 secondary to OM of foot(H)  Physical deconditioning  Chronic pain syndrome  Opioid (Methadone) dependent, uncomplicated  Neuropathy  - has a contract with [facility], [place].  started rehab program, making a progress, continue until desired goal is achieved.     LUE edema, query tenosynovitis/tendonitis  -affecting ROM.  - will order USG  - will start naproxen 500 mg bid x 5 days. Take with food.       Orders:  -  USG LUE  - Naproxen 500 mg bid x 5 days. Take with food.   - hold coumadin, recheck on 6/19        Electronically signed by:  Marely Raygoza MD                     Sincerely,        Marely Raygoza MD

## 2024-06-18 ENCOUNTER — TRANSFERRED RECORDS (OUTPATIENT)
Dept: HEALTH INFORMATION MANAGEMENT | Facility: CLINIC | Age: 79
End: 2024-06-18
Payer: COMMERCIAL

## 2024-06-18 ENCOUNTER — TELEPHONE (OUTPATIENT)
Dept: GERIATRICS | Facility: CLINIC | Age: 79
End: 2024-06-18
Payer: COMMERCIAL

## 2024-06-18 NOTE — TELEPHONE ENCOUNTER
[facility] Lab Note     Provider: Vickie Ventura NP  Facility: [place]  Facility Type:  TCU    Allergies   Allergen Reactions    Prednisone Nausea and Vomiting    Morphine Nausea and Vomiting    Morphine [Fumaric Acid] Nausea and Vomiting    Nitrofurantoin      Per nursing home       Labs Reviewed by provider: US     Verbal Order/Direction given by Provider: NNO    Provider giving Order:  Vickie Ventura NP    Verbal Order given to: Rickey De Dios RN

## 2024-06-19 ENCOUNTER — LAB REQUISITION (OUTPATIENT)
Dept: LAB | Facility: CLINIC | Age: 79
End: 2024-06-19
Payer: COMMERCIAL

## 2024-06-19 ENCOUNTER — TELEPHONE (OUTPATIENT)
Dept: GERIATRICS | Facility: CLINIC | Age: 79
End: 2024-06-19

## 2024-06-19 DIAGNOSIS — L03.90 CELLULITIS, UNSPECIFIED: ICD-10-CM

## 2024-06-19 LAB
ANION GAP SERPL CALCULATED.3IONS-SCNC: 14 MMOL/L (ref 7–15)
BUN SERPL-MCNC: 50.1 MG/DL (ref 8–23)
CALCIUM SERPL-MCNC: 9.8 MG/DL (ref 8.8–10.2)
CHLORIDE SERPL-SCNC: 87 MMOL/L (ref 98–107)
CREAT SERPL-MCNC: 1.65 MG/DL (ref 0.51–0.95)
CRP SERPL-MCNC: 193 MG/L
DEPRECATED HCO3 PLAS-SCNC: 34 MMOL/L (ref 22–29)
EGFRCR SERPLBLD CKD-EPI 2021: 31 ML/MIN/1.73M2
ERYTHROCYTE [DISTWIDTH] IN BLOOD BY AUTOMATED COUNT: 14.5 % (ref 10–15)
GLUCOSE SERPL-MCNC: 213 MG/DL (ref 70–99)
HCT VFR BLD AUTO: 32.2 % (ref 35–47)
HGB BLD-MCNC: 10.2 G/DL (ref 11.7–15.7)
MCH RBC QN AUTO: 29.8 PG (ref 26.5–33)
MCHC RBC AUTO-ENTMCNC: 31.7 G/DL (ref 31.5–36.5)
MCV RBC AUTO: 94 FL (ref 78–100)
PLATELET # BLD AUTO: 287 10E3/UL (ref 150–450)
POTASSIUM SERPL-SCNC: 3.2 MMOL/L (ref 3.4–5.3)
RBC # BLD AUTO: 3.42 10E6/UL (ref 3.8–5.2)
SODIUM SERPL-SCNC: 135 MMOL/L (ref 135–145)
WBC # BLD AUTO: 10.7 10E3/UL (ref 4–11)

## 2024-06-19 PROCEDURE — 80048 BASIC METABOLIC PNL TOTAL CA: CPT | Mod: ORL | Performed by: FAMILY MEDICINE

## 2024-06-19 PROCEDURE — 86140 C-REACTIVE PROTEIN: CPT | Mod: ORL | Performed by: FAMILY MEDICINE

## 2024-06-19 PROCEDURE — 85027 COMPLETE CBC AUTOMATED: CPT | Mod: ORL | Performed by: FAMILY MEDICINE

## 2024-06-19 RX ORDER — POTASSIUM CHLORIDE 1500 MG/1
20 TABLET, EXTENDED RELEASE ORAL 2 TIMES DAILY
COMMUNITY

## 2024-06-19 NOTE — TELEPHONE ENCOUNTER
[facility] Lab Note     Provider: Vickie Ventura NP  Facility: [place]  Facility Type:  TCU    Allergies   Allergen Reactions    Prednisone Nausea and Vomiting    Morphine Nausea and Vomiting    Morphine [Fumaric Acid] Nausea and Vomiting    Nitrofurantoin      Per nursing home       Labs Reviewed by provider: BMP and CBC. INR via fingerstick - 3.9    Verbal Order/Direction given by Provider: Potassium Chloride 20 meq PO every day. Check INR on 6/20/24.    Provider giving Order:  Vickie Ventura NP    Verbal Order given to: Maxine De Dios RN

## 2024-06-20 ENCOUNTER — TRANSITIONAL CARE UNIT VISIT (OUTPATIENT)
Dept: GERIATRICS | Facility: CLINIC | Age: 79
End: 2024-06-20
Payer: COMMERCIAL

## 2024-06-20 VITALS
WEIGHT: 232 LBS | SYSTOLIC BLOOD PRESSURE: 124 MMHG | BODY MASS INDEX: 37.28 KG/M2 | RESPIRATION RATE: 18 BRPM | DIASTOLIC BLOOD PRESSURE: 60 MMHG | TEMPERATURE: 97.3 F | HEIGHT: 66 IN | OXYGEN SATURATION: 97 % | HEART RATE: 83 BPM

## 2024-06-20 DIAGNOSIS — E11.42 TYPE 2 DIABETES MELLITUS WITH DIABETIC POLYNEUROPATHY, WITH LONG-TERM CURRENT USE OF INSULIN (H): ICD-10-CM

## 2024-06-20 DIAGNOSIS — L03.115 CELLULITIS OF RIGHT LOWER EXTREMITY: Primary | ICD-10-CM

## 2024-06-20 DIAGNOSIS — I48.20 CHRONIC ATRIAL FIBRILLATION (H): ICD-10-CM

## 2024-06-20 DIAGNOSIS — Z79.4 TYPE 2 DIABETES MELLITUS WITH DIABETIC POLYNEUROPATHY, WITH LONG-TERM CURRENT USE OF INSULIN (H): ICD-10-CM

## 2024-06-20 DIAGNOSIS — Z89.512 STATUS POST BELOW-KNEE AMPUTATION OF LEFT LOWER EXTREMITY (H): ICD-10-CM

## 2024-06-20 DIAGNOSIS — I50.812 CHRONIC RIGHT-SIDED HEART FAILURE (H): ICD-10-CM

## 2024-06-20 DIAGNOSIS — F11.20 OPIOID DEPENDENCE, UNCOMPLICATED (H): ICD-10-CM

## 2024-06-20 DIAGNOSIS — Z79.01 LONG TERM (CURRENT) USE OF ANTICOAGULANTS: ICD-10-CM

## 2024-06-20 DIAGNOSIS — I89.0 LYMPHEDEMA: ICD-10-CM

## 2024-06-20 PROBLEM — Z86.79 PERSONAL HISTORY OF OTHER DISEASES OF THE CIRCULATORY SYSTEM: Status: ACTIVE | Noted: 2023-09-22

## 2024-06-20 PROBLEM — F33.41 MAJOR DEPRESSIVE DISORDER, RECURRENT, IN PARTIAL REMISSION (H): Status: ACTIVE | Noted: 2024-04-16

## 2024-06-20 PROBLEM — M62.81 MUSCLE WEAKNESS (GENERALIZED): Status: ACTIVE | Noted: 2023-11-15

## 2024-06-20 PROBLEM — S91.302D UNSPECIFIED OPEN WOUND, LEFT FOOT, SUBSEQUENT ENCOUNTER: Status: ACTIVE | Noted: 2023-09-22

## 2024-06-20 PROBLEM — T14.8XXD OTHER INJURY OF UNSPECIFIED BODY REGION, SUBSEQUENT ENCOUNTER: Status: ACTIVE | Noted: 2023-11-15

## 2024-06-20 PROBLEM — K59.00 CONSTIPATION: Status: ACTIVE | Noted: 2024-04-16

## 2024-06-20 PROBLEM — I87.8 VENOUS STASIS: Status: ACTIVE | Noted: 2024-04-16

## 2024-06-20 PROBLEM — K59.03 DRUG INDUCED CONSTIPATION: Status: ACTIVE | Noted: 2023-11-15

## 2024-06-20 PROBLEM — R11.0 NAUSEA: Status: ACTIVE | Noted: 2023-11-15

## 2024-06-20 PROBLEM — R60.0 LOCALIZED EDEMA: Status: ACTIVE | Noted: 2019-05-29

## 2024-06-20 PROBLEM — R26.89 OTHER ABNORMALITIES OF GAIT AND MOBILITY: Status: ACTIVE | Noted: 2023-11-15

## 2024-06-20 PROBLEM — Z87.39 HX OF OSTEOMYELITIS: Status: ACTIVE | Noted: 2024-04-16

## 2024-06-20 PROBLEM — T45.515A ADVERSE EFFECT OF ANTICOAGULANTS, INITIAL ENCOUNTER: Status: ACTIVE | Noted: 2024-04-16

## 2024-06-20 PROBLEM — R01.1 CARDIAC MURMUR, UNSPECIFIED: Status: ACTIVE | Noted: 2019-05-29

## 2024-06-20 PROCEDURE — 99309 SBSQ NF CARE MODERATE MDM 30: CPT | Performed by: NURSE PRACTITIONER

## 2024-06-20 RX ORDER — METHADONE HYDROCHLORIDE 5 MG/1
5 TABLET ORAL 4 TIMES DAILY
Qty: 40 TABLET | Refills: 0 | Status: SHIPPED | OUTPATIENT
Start: 2024-06-20 | End: 2024-06-27

## 2024-06-20 NOTE — LETTER
" 6/20/2024      Linda Loredo  [address] [address]        [facility] GERIATRICS  TCU FOLLOW UP VISIT    Chief Complaint   Patient presents with     RECHECK      Place of Service where encounter took place:  [facility] (TCU/SNF) [[id number]]    Linda Loredo  is a 79 year old  (1945), who is being seen today at the above facility to discuss progress in therapy, review nursing home EHR, recheck chronic medical problems as well as address any new concerns.       HPI:    Copied forward from previous note: admitted to the above facility from  [facility]. Hospital stay 6/6/24 through 6/10/24..   HPI:    As per DNP's note:\"78 y/o female with significant past medical hx of T2DM, hx of LLE amputation, fibromyalgia,  chronic venous stasis, paroxysmal afib on warfarin, CHF, CKD, chronic opioid use who presented to hospital on 6/6/2024 with concerns for RLE cellulitis. Redness and swelling rapidly expanded. Initially treated with IV Zosyn, Vancomycin. Urine culture also grew Providencia rettgeri resistant to zosyn.  IV AB switched to Rocephin.   Had some hypoglycemic episodes, Ozempic and Tresiba held.  Resumed Tresiba at hospital discharge. INR elevated, Warfarin held. Creat was up slightly, not true GUSTAVO. Deconditioned and needing IV antibiotics, therefore transferred to this TCU.\"  UPDATES SINCE ADMISSION TO TCU:   6/17 MD visit. -  USG LUE  - Naproxen 500 mg bid x 5 days. Take with food.   - hold coumadin, recheck on 6/19 6/18 ultrasound positive for superficial clot. No New Orders.     Today: Nurse reports has orders for Accu-Cheks before meals and bedtime gets insulin in the a.m. before breakfast continue decrease Accu-Cheks.  Discussed with patient and she has a CGM and gets results to the nurse she would like to continue as is for now.  INR not done yet today.  Will do warfarin 3 mg today and check INR tomorrow.  Most " "recently INR was elevated warfarin has been held for several days.  MD had previously wanted a venous blood draw of the INR but this was unable to be obtained due to not being drawn in the correct tube.  Also patient refuses lab draws.     Facility EHR reviewed including bm record, progress notes, vital signs and MAR.     ALLERGIES: Prednisone, Morphine, Morphine [fumaric acid], and Nitrofurantoin    ROS:  4 point ROS including Respiratory, CV, GI and , other than that noted in the HPI,  is negative    Vitals:  /60   Pulse 83   Temp 97.3  F (36.3  C)   Resp 18   Ht 1.676 m (5' 6\")   Wt 105.2 kg (232 lb)   SpO2 97%   BMI 37.45 kg/m    Exam:  GENERAL APPEARANCE:  Alert, in no distress  RESP:  respiratory effort normal.   CV:  edema +2 pitting right LE. Per patient improving.   SKIN:  Inspection and palpation of skin right LE with edema and redness. Some yellowish drainage noted on bandage.   PSYCH:  insight and judgement, memory intact, affect and mood normal    ASSESSMENT/PLAN:  Opioid dependence, uncomplicated (H)  Reviewed and reordered narcotic pain medication.  - methadone (DOLOPHINE) 5 MG tablet; Take 1 tablet (5 mg) by mouth 4 times daily 0700, 1100, 1500, 2000    Cellulitis of right lower extremity  Completed IV antibiotics.  Will write orders to remove PICC line    Chronic atrial fibrillation (H)  Long term (current) use of anticoagulants  INR not checked today.  Will order 3 mg today as her home dose was 3 mg once daily and recheck tomorrow.    Chronic right-sided heart failure (H)  No current concerns.    Type 2 diabetes mellitus with diabetic polyneuropathy, with long-term current use of insulin (H)  Continue with PTA regimen.    Lymphedema  Continue with wound care and diuretics.  Cellulitis improving.    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)  Complicating rehab efforts.    LUE edema, query tenosynovitis/tendonitis   -Pain significantly improved edema still " significantly improved.    Orders:  Warfarin 3 mg p.o. today check INR tomorrow  -Okay to remove PICC line    Electronically signed by: Vickie Ventura NP       Sincerely,        Vickie Ventura NP

## 2024-06-23 ENCOUNTER — HEALTH MAINTENANCE LETTER (OUTPATIENT)
Age: 79
End: 2024-06-23

## 2024-06-24 ENCOUNTER — TRANSITIONAL CARE UNIT VISIT (OUTPATIENT)
Dept: GERIATRICS | Facility: CLINIC | Age: 79
End: 2024-06-24
Payer: COMMERCIAL

## 2024-06-24 VITALS
HEIGHT: 66 IN | RESPIRATION RATE: 18 BRPM | SYSTOLIC BLOOD PRESSURE: 152 MMHG | TEMPERATURE: 97.5 F | WEIGHT: 226.8 LBS | OXYGEN SATURATION: 95 % | HEART RATE: 77 BPM | DIASTOLIC BLOOD PRESSURE: 80 MMHG | BODY MASS INDEX: 36.45 KG/M2

## 2024-06-24 DIAGNOSIS — F11.20 OPIOID DEPENDENCE, UNCOMPLICATED (H): Primary | ICD-10-CM

## 2024-06-24 PROCEDURE — 99309 SBSQ NF CARE MODERATE MDM 30: CPT | Performed by: NURSE PRACTITIONER

## 2024-06-24 NOTE — LETTER
" 6/24/2024      Linda Loredo  [address] [address]        [facility] GERIATRICS  TCU FOLLOW UP VISIT    Chief Complaint   Patient presents with     RECHECK      Place of Service where encounter took place:  [facility] (TCU/SNF) [4000]    Linda Loredo  is a 79 year old  (1945), who is being seen today at the above facility to discuss progress in therapy, review nursing home EHR, recheck chronic medical problems as well as address any new concerns.       HPI:    Copied forward from previous note: admitted to the above facility from  [facility]. Hospital stay 6/6/24 through 6/10/24..   HPI:    As per DNP's note:\"78 y/o female with significant past medical hx of T2DM, hx of LLE amputation, fibromyalgia,  chronic venous stasis, paroxysmal afib on warfarin, CHF, CKD, chronic opioid use who presented to hospital on 6/6/2024 with concerns for RLE cellulitis. Redness and swelling rapidly expanded. Initially treated with IV Zosyn, Vancomycin. Urine culture also grew Providencia rettgeri resistant to zosyn.  IV AB switched to Rocephin.   Had some hypoglycemic episodes, Ozempic and Tresiba held.  Resumed Tresiba at hospital discharge. INR elevated, Warfarin held. Creat was up slightly, not true GUSTAVO. Deconditioned and needing IV antibiotics, therefore transferred to this TCU.\"  UPDATES SINCE ADMISSION TO TCU:   6/17 MD visit. -  USG LUE  - Naproxen 500 mg bid x 5 days. Take with food.   - hold coumadin, recheck on 6/19 6/18 ultrasound positive for superficial clot. No New Orders.   6/20 completed antibiotics and PICC line removed.    Today: Therapy reports requires contact-guard with a sliding board pivot transfer.  Requires 2 people for transfers.  Upper body dressing is independent.  Lower body dressing is max assist.  She was refusing wound care this weekend and refused wound care this morning stating she wanted to go " "home.   reports assisted living is coming out tomorrow to assess her.  Nursing reports INR today was three 3.0.  Facility EHR notes last BM was 6/23.  Blood pressure is 120/66-1 68/79.  Pulse 77-92.  Blood sugar 82-2 98.  The redness on her light right lower extremity continues to improve.  Her edema continues to improve.  Her weight has declined as expected.  She states she still has a dressing on the right hip which she believes is healed.  We reviewed her med list and reviewed her progress in therapy.  Discharge planning discussed.    Facility EHR reviewed including bm record, progress notes, vital signs and MAR.     ALLERGIES: Prednisone, Morphine, Morphine [fumaric acid], and Nitrofurantoin    ROS:  4 point ROS including Respiratory, CV, GI and , other than that noted in the HPI,  is negative    Vitals:  BP (!) 152/80   Pulse 77   Temp 97.5  F (36.4  C)   Resp 18   Ht 1.676 m (5' 6\")   Wt 102.9 kg (226 lb 12.8 oz)   SpO2 95%   BMI 36.61 kg/m    Exam:  GENERAL APPEARANCE:  Alert, in no distress  RESP:  respiratory effort normal.   CV:  edema +2 pitting right LE.   SKIN:  Inspection and palpation of skin right LE with edema and redness. Open to air and dry.   PSYCH:  insight and judgement, memory intact, affect and mood normal    ASSESSMENT/PLAN:  Chronic atrial fibrillation (H)  Long term (current) use of anticoagulants  INR 3.0 today.  Previous home dosing of 3 mg every day warfarin.  INR was 4.2 on Friday and warfarin was held for 3 days.  Patient questions if she can get more strips she needs to get those from her home.  Since the facility does not supply them.  -Warfarin 1.5 mg Monday Wednesday.  3 mg Tuesday Thursday  Recheck INR on Friday-     Chronic right-sided heart failure (H)  No current concerns.    Type 2 diabetes mellitus with diabetic polyneuropathy, with long-term current use of insulin (H)  Continue with PTA regimen.    Lymphedema  Continue with wound care and diuretics.  " Cellulitis improving.    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)  Complicating rehab efforts.    Orders:  -INR 3.0 warfarin 1.5 mg p.o. Monday Wednesday at 3 mg p.o. Tuesday Thursday.  Recheck INR on Friday      Electronically signed by: Vickie Ventura NP       Sincerely,        Vickie Ventura NP

## 2024-06-24 NOTE — PROGRESS NOTES
"[facility] GERIATRICS  TCU FOLLOW UP VISIT    Chief Complaint   Patient presents with    RECHECK      Place of Service where encounter took place:  [facility] (TCU/SNF) [4000]    Linda Loredo  is a 79 year old  (1945), who is being seen today at the above facility to discuss progress in therapy, review nursing home EHR, recheck chronic medical problems as well as address any new concerns.       HPI:    Copied forward from previous note: admitted to the above facility from  [facility]. Hospital stay 6/6/24 through 6/10/24..   HPI:    As per DNP's note:\"80 y/o female with significant past medical hx of T2DM, hx of LLE amputation, fibromyalgia,  chronic venous stasis, paroxysmal afib on warfarin, CHF, CKD, chronic opioid use who presented to hospital on 6/6/2024 with concerns for RLE cellulitis. Redness and swelling rapidly expanded. Initially treated with IV Zosyn, Vancomycin. Urine culture also grew Providencia rettgeri resistant to zosyn.  IV AB switched to Rocephin.   Had some hypoglycemic episodes, Ozempic and Tresiba held.  Resumed Tresiba at hospital discharge. INR elevated, Warfarin held. Creat was up slightly, not true GUSTAVO. Deconditioned and needing IV antibiotics, therefore transferred to this TCU.\"  UPDATES SINCE ADMISSION TO TCU:   6/17 MD visit. -  USG LUE  - Naproxen 500 mg bid x 5 days. Take with food.   - hold coumadin, recheck on 6/19 6/18 ultrasound positive for superficial clot. No New Orders.     Today: Nurse reports has orders for Accu-Cheks before meals and bedtime gets insulin in the a.m. before breakfast continue decrease Accu-Cheks.  Discussed with patient and she has a CGM and gets results to the nurse she would like to continue as is for now.  INR not done yet today.  Will do warfarin 3 mg today and check INR tomorrow.  Most recently INR was elevated warfarin has been held for several days.  MD had previously wanted a " "venous blood draw of the INR but this was unable to be obtained due to not being drawn in the correct tube.  Also patient refuses lab draws.     Facility EHR reviewed including bm record, progress notes, vital signs and MAR.     ALLERGIES: Prednisone, Morphine, Morphine [fumaric acid], and Nitrofurantoin    ROS:  4 point ROS including Respiratory, CV, GI and , other than that noted in the HPI,  is negative    Vitals:  /60   Pulse 83   Temp 97.3  F (36.3  C)   Resp 18   Ht 1.676 m (5' 6\")   Wt 105.2 kg (232 lb)   SpO2 97%   BMI 37.45 kg/m    Exam:  GENERAL APPEARANCE:  Alert, in no distress  RESP:  respiratory effort normal.   CV:  edema +2 pitting right LE. Per patient improving.   SKIN:  Inspection and palpation of skin right LE with edema and redness. Some yellowish drainage noted on bandage.   PSYCH:  insight and judgement, memory intact, affect and mood normal    ASSESSMENT/PLAN:  Opioid dependence, uncomplicated (H)  Reviewed and reordered narcotic pain medication.  - methadone (DOLOPHINE) 5 MG tablet; Take 1 tablet (5 mg) by mouth 4 times daily 0700, 1100, 1500, 2000    Cellulitis of right lower extremity  Completed IV antibiotics.  Will write orders to remove PICC line    Chronic atrial fibrillation (H)  Long term (current) use of anticoagulants  INR not checked today.  Will order 3 mg today as her home dose was 3 mg once daily and recheck tomorrow.    Chronic right-sided heart failure (H)  No current concerns.    Type 2 diabetes mellitus with diabetic polyneuropathy, with long-term current use of insulin (H)  Continue with PTA regimen.    Lymphedema  Continue with wound care and diuretics.  Cellulitis improving.    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)  Complicating rehab efforts.    LUE edema, query tenosynovitis/tendonitis   -Pain significantly improved edema still significantly improved.    Orders:  Warfarin 3 mg p.o. today check INR tomorrow  -Okay to remove PICC " line    Electronically signed by: Vickie Ventura NP

## 2024-06-26 NOTE — PROGRESS NOTES
"[facility] GERIATRICS  TCU FOLLOW UP VISIT    Chief Complaint   Patient presents with    RECHECK      Place of Service where encounter took place:  [facility] (TCU/SNF) [[id number]]    Linda Loredo  is a 79 year old  (1945), who is being seen today at the above facility to discuss progress in therapy, review nursing home EHR, recheck chronic medical problems as well as address any new concerns.       HPI:    Copied forward from previous note: admitted to the above facility from  [facility]. Hospital stay 6/6/24 through 6/10/24..   HPI:    As per DNP's note:\"78 y/o female with significant past medical hx of T2DM, hx of LLE amputation, fibromyalgia,  chronic venous stasis, paroxysmal afib on warfarin, CHF, CKD, chronic opioid use who presented to hospital on 6/6/2024 with concerns for RLE cellulitis. Redness and swelling rapidly expanded. Initially treated with IV Zosyn, Vancomycin. Urine culture also grew Providencia rettgeri resistant to zosyn.  IV AB switched to Rocephin.   Had some hypoglycemic episodes, Ozempic and Tresiba held.  Resumed Tresiba at hospital discharge. INR elevated, Warfarin held. Creat was up slightly, not true GUSTAVO. Deconditioned and needing IV antibiotics, therefore transferred to this TCU.\"  UPDATES SINCE ADMISSION TO TCU:   6/17 MD visit. -  USG LUE  - Naproxen 500 mg bid x 5 days. Take with food.   - hold coumadin, recheck on 6/19 6/18 ultrasound positive for superficial clot. No New Orders.   6/20 completed antibiotics and PICC line removed.    Today: Therapy reports requires contact-guard with a sliding board pivot transfer.  Requires 2 people for transfers.  Upper body dressing is independent.  Lower body dressing is max assist.  She was refusing wound care this weekend and refused wound care this morning stating she wanted to go home.   reports assisted living is coming out tomorrow to assess her.  Nursing " "reports INR today was three 3.0.  Facility EHR notes last BM was 6/23.  Blood pressure is 120/66-1 68/79.  Pulse 77-92.  Blood sugar 82-2 98.  The redness on her light right lower extremity continues to improve.  Her edema continues to improve.  Her weight has declined as expected.  She states she still has a dressing on the right hip which she believes is healed.  We reviewed her med list and reviewed her progress in therapy.  Discharge planning discussed.    Facility EHR reviewed including bm record, progress notes, vital signs and MAR.     ALLERGIES: Prednisone, Morphine, Morphine [fumaric acid], and Nitrofurantoin    ROS:  4 point ROS including Respiratory, CV, GI and , other than that noted in the HPI,  is negative    Vitals:  BP (!) 152/80   Pulse 77   Temp 97.5  F (36.4  C)   Resp 18   Ht 1.676 m (5' 6\")   Wt 102.9 kg (226 lb 12.8 oz)   SpO2 95%   BMI 36.61 kg/m    Exam:  GENERAL APPEARANCE:  Alert, in no distress  RESP:  respiratory effort normal.   CV:  edema +2 pitting right LE.   SKIN:  Inspection and palpation of skin right LE with edema and redness. Open to air and dry.   PSYCH:  insight and judgement, memory intact, affect and mood normal    ASSESSMENT/PLAN:  Chronic atrial fibrillation (H)  Long term (current) use of anticoagulants  INR 3.0 today.  Previous home dosing of 3 mg every day warfarin.  INR was 4.2 on Friday and warfarin was held for 3 days.  Patient questions if she can get more strips she needs to get those from her home.  Since the facility does not supply them.  -Warfarin 1.5 mg Monday Wednesday.  3 mg Tuesday Thursday  Recheck INR on Friday-     Chronic right-sided heart failure (H)  No current concerns.    Type 2 diabetes mellitus with diabetic polyneuropathy, with long-term current use of insulin (H)  Continue with PTA regimen.    Lymphedema  Continue with wound care and diuretics.  Cellulitis improving.    Status post below-knee amputation of left lower extremity " (H)  Complicating rehab efforts.    Orders:  -INR 3.0 warfarin 1.5 mg p.o. Monday Wednesday at 3 mg p.o. Tuesday Thursday.  Recheck INR on Friday      Electronically signed by: Vickie Ventura NP

## 2024-06-27 DIAGNOSIS — I89.0 LYMPHEDEMA: ICD-10-CM

## 2024-06-27 DIAGNOSIS — R63.5 WEIGHT GAIN: ICD-10-CM

## 2024-06-27 DIAGNOSIS — F11.20 OPIOID DEPENDENCE, UNCOMPLICATED (H): ICD-10-CM

## 2024-06-27 RX ORDER — METHADONE HYDROCHLORIDE 5 MG/1
5 TABLET ORAL 4 TIMES DAILY
Qty: 40 TABLET | Refills: 0 | Status: ON HOLD | OUTPATIENT
Start: 2024-06-27 | End: 2024-07-16

## 2024-06-28 ENCOUNTER — TELEPHONE (OUTPATIENT)
Dept: GERIATRICS | Facility: CLINIC | Age: 79
End: 2024-06-28
Payer: COMMERCIAL

## 2024-06-28 NOTE — TELEPHONE ENCOUNTER
[facility] Geriatrics Triage Nurse INR     Provider: Vickie Ventura NP  Facility: [facility]   Facility Type:  TCU    Caller: [place]  Call Back Number: [phone]  Reason for call: INR  Diagnosis/Goal: A. Fib    Date INR New Dose/Orders   6/19 3.9 held   6/20  3mg    6/21 4.5 held   6/24 3.0 1.5mg M/W and 3mg T/TH 6/28 3.0         Heparin/Lovenox:  No  Currently on ABX?: No  Other interacting medication:  None  Missed or refused doses: No    Verbal Order/Direction given by Provider:   1.5 mg F/Sat/Sun and  3 mg Monday recheck tues 7/2    Provider Giving Order:  Vickie Ventura NP    Verbal Order given to: Anyi Alberto RN

## 2024-07-02 ENCOUNTER — TELEPHONE (OUTPATIENT)
Dept: GERIATRICS | Facility: CLINIC | Age: 79
End: 2024-07-02
Payer: COMMERCIAL

## 2024-07-02 NOTE — TELEPHONE ENCOUNTER
[facility] Geriatrics Triage Nurse INR     Provider: Vickie Ventura NP  Facility: [place]   Facility Type:  TCU    Caller: Anabell  Call Back Number: [phone]  Reason for call: INR  Diagnosis/Goal: DVT    Date INR New Dose/Orders        6/24/24 3.0 Take 1.5mg Mon/Wed and 3mg Tues/Thurs.  Next INR 6/28/24.   6/28/24 3.0 Take 1.5mg Fri/Sat/Sun and 3mg Mon.  Next INR 7/2/24.   7/2/24 4.9 Hold x 2 days then take 1mg daily thereafter.  Next INR 7/8/24.        Heparin/Lovenox:  No  Currently on ABX?: No  Other interacting medication:  ASA  Missed or refused doses: No    Verbal Order/Direction given by Provider: Hold Warfarin x 2 days then take 1mg daily thereafter.  Next INR 7/8/24.      Provider Giving Order:  Vickie Ventura NP    Verbal Order given to: Anabell Martinez RN

## 2024-07-03 ENCOUNTER — TRANSITIONAL CARE UNIT VISIT (OUTPATIENT)
Dept: GERIATRICS | Facility: CLINIC | Age: 79
End: 2024-07-03
Payer: COMMERCIAL

## 2024-07-03 ENCOUNTER — LAB REQUISITION (OUTPATIENT)
Dept: LAB | Facility: CLINIC | Age: 79
End: 2024-07-03
Payer: COMMERCIAL

## 2024-07-03 VITALS
SYSTOLIC BLOOD PRESSURE: 139 MMHG | RESPIRATION RATE: 18 BRPM | OXYGEN SATURATION: 90 % | DIASTOLIC BLOOD PRESSURE: 78 MMHG | HEIGHT: 66 IN | HEART RATE: 94 BPM | WEIGHT: 223 LBS | BODY MASS INDEX: 35.84 KG/M2 | TEMPERATURE: 97.5 F

## 2024-07-03 DIAGNOSIS — I83.009 VENOUS ULCER (H): ICD-10-CM

## 2024-07-03 DIAGNOSIS — L30.8 DERMATITIS ASSOCIATED WITH MOISTURE: ICD-10-CM

## 2024-07-03 DIAGNOSIS — L03.115 CELLULITIS OF RIGHT LOWER EXTREMITY: ICD-10-CM

## 2024-07-03 DIAGNOSIS — I89.0 LYMPHEDEMA: Primary | ICD-10-CM

## 2024-07-03 DIAGNOSIS — L97.909 VENOUS ULCER (H): ICD-10-CM

## 2024-07-03 DIAGNOSIS — I48.91 UNSPECIFIED ATRIAL FIBRILLATION (H): ICD-10-CM

## 2024-07-03 PROCEDURE — 99310 SBSQ NF CARE HIGH MDM 45: CPT | Performed by: NURSE PRACTITIONER

## 2024-07-03 NOTE — LETTER
" 7/3/2024      Linda Loredo  [address] [address]        [facility] GERIATRIC [facility]  Acute visit    Chief Complaint   Patient presents with     RECHECK     HPI:  Linda Loredo is a 79 year old  (1945), who is being seen today for an episodic care visit at: [facility] (U/SNF) [4000]. Today's concern is:      Lymphedema  Cellulitis of right lower extremity  Venous ulcer (H)  Dermatitis associated with moisture    Nurse reports new right lower extremity ulcer with signs of infection.    OT had wrapped her leg but patient cut it off.  Nursing is also reporting that patient refuses dressing changes and incontinence cares at times.  OT is requesting a air mattress.    Patient reports painful.  Reports that her leg is sticking to the pillow.  Advised that she use a dressing.  I reviewed the dressing that I recommend and she agrees.  Will also start antibiotics for cellulitis.    Plan of care reviewed with patient, nursing, OT    ROS:  4 point ROS including Respiratory, CV, GI and , other than that noted in the HPI,  is negative    Vitals:  /78   Pulse 94   Temp 97.5  F (36.4  C)   Resp 18   Ht 1.676 m (5' 6\")   Wt 101.2 kg (223 lb)   SpO2 90%   BMI 35.99 kg/m    Exam:  General: Alert.  In no distress right lower extremity with large open ulcer.  Edema seems at baseline.  Wt Readings from Last 4 Encounters:   07/03/24 101.2 kg (223 lb)   06/24/24 102.9 kg (226 lb 12.8 oz)   06/20/24 105.2 kg (232 lb)   06/17/24 104.3 kg (230 lb)     MASD right thigh.      Venous ulcer right lower leg      ASSESSMENT/PLAN:  Lymphedema  Continue with diuretics.  Patient did not tolerate compression therapy.    Cellulitis of right lower extremity  Treat with Keflex    Venous ulcer (H)  Wound care per orders    Dermatitis associated with moisture  Improving.  Continue zinc oxide barrier cream and incontinence cares.    Currently has no areas of pressure " injury.  Would not qualify for a pressure reduction mattress.    Orders:  -Right leg wound clean with wound cleanser, apply Vaseline gauze, then ABD and then wrapped with Kerlix change once daily  -Keflex 500 mg p.o. 3 times daily x 10 days      Electronically signed by: Vickie Ventura NP       Sincerely,        Vickie Ventura NP

## 2024-07-04 NOTE — PROGRESS NOTES
"[place] GERIATRIC SERVICES  Acute visit    Chief Complaint   Patient presents with    RECHECK     HPI:  Linda Loredo is a 79 year old  (1945), who is being seen today for an episodic care visit at: [facility] (U/SNF) [[id number]]. Today's concern is:      Lymphedema  Cellulitis of right lower extremity  Venous ulcer (H)  Dermatitis associated with moisture    Nurse reports new right lower extremity ulcer with signs of infection.    OT had wrapped her leg but patient cut it off.  Nursing is also reporting that patient refuses dressing changes and incontinence cares at times.  OT is requesting a air mattress.    Patient reports painful.  Reports that her leg is sticking to the pillow.  Advised that she use a dressing.  I reviewed the dressing that I recommend and she agrees.  Will also start antibiotics for cellulitis.    Plan of care reviewed with patient, nursing, OT    ROS:  4 point ROS including Respiratory, CV, GI and , other than that noted in the HPI,  is negative    Vitals:  /78   Pulse 94   Temp 97.5  F (36.4  C)   Resp 18   Ht 1.676 m (5' 6\")   Wt 101.2 kg (223 lb)   SpO2 90%   BMI 35.99 kg/m    Exam:  General: Alert.  In no distress right lower extremity with large open ulcer.  Edema seems at baseline.  Wt Readings from Last 4 Encounters:   07/03/24 101.2 kg (223 lb)   06/24/24 102.9 kg (226 lb 12.8 oz)   06/20/24 105.2 kg (232 lb)   06/17/24 104.3 kg (230 lb)     MASD right thigh.      Venous ulcer right lower leg      ASSESSMENT/PLAN:  Lymphedema  Continue with diuretics.  Patient did not tolerate compression therapy.    Cellulitis of right lower extremity  Treat with Keflex    Venous ulcer (H)  Wound care per orders    Dermatitis associated with moisture  Improving.  Continue zinc oxide barrier cream and incontinence cares.    Currently has no areas of pressure injury.  Would not qualify for a pressure reduction mattress.    Orders:  -Right leg wound clean " with wound cleanser, apply Vaseline gauze, then ABD and then wrapped with Kerlix change once daily  -Keflex 500 mg p.o. 3 times daily x 10 days      Electronically signed by: Vickie Ventura NP

## 2024-07-08 ENCOUNTER — DISCHARGE SUMMARY NURSING HOME (OUTPATIENT)
Dept: GERIATRICS | Facility: CLINIC | Age: 79
End: 2024-07-08
Payer: COMMERCIAL

## 2024-07-08 VITALS
RESPIRATION RATE: 18 BRPM | HEIGHT: 66 IN | BODY MASS INDEX: 35.07 KG/M2 | OXYGEN SATURATION: 92 % | HEART RATE: 104 BPM | WEIGHT: 218.2 LBS | TEMPERATURE: 97.9 F | SYSTOLIC BLOOD PRESSURE: 154 MMHG | DIASTOLIC BLOOD PRESSURE: 96 MMHG

## 2024-07-08 DIAGNOSIS — F11.20 OPIOID DEPENDENCE, UNCOMPLICATED (H): ICD-10-CM

## 2024-07-08 DIAGNOSIS — I83.009 VENOUS ULCER (H): ICD-10-CM

## 2024-07-08 DIAGNOSIS — L03.115 CELLULITIS OF RIGHT LOWER EXTREMITY: ICD-10-CM

## 2024-07-08 DIAGNOSIS — L97.909 VENOUS ULCER (H): ICD-10-CM

## 2024-07-08 DIAGNOSIS — Z89.512 STATUS POST BELOW-KNEE AMPUTATION OF LEFT LOWER EXTREMITY (H): ICD-10-CM

## 2024-07-08 DIAGNOSIS — E11.42 TYPE 2 DIABETES MELLITUS WITH DIABETIC POLYNEUROPATHY, WITH LONG-TERM CURRENT USE OF INSULIN (H): ICD-10-CM

## 2024-07-08 DIAGNOSIS — L30.8 DERMATITIS ASSOCIATED WITH MOISTURE: ICD-10-CM

## 2024-07-08 DIAGNOSIS — I89.0 LYMPHEDEMA: Primary | ICD-10-CM

## 2024-07-08 DIAGNOSIS — Z79.4 TYPE 2 DIABETES MELLITUS WITH DIABETIC POLYNEUROPATHY, WITH LONG-TERM CURRENT USE OF INSULIN (H): ICD-10-CM

## 2024-07-08 DIAGNOSIS — I50.812 CHRONIC RIGHT-SIDED HEART FAILURE (H): ICD-10-CM

## 2024-07-08 DIAGNOSIS — I48.20 CHRONIC ATRIAL FIBRILLATION (H): ICD-10-CM

## 2024-07-08 PROCEDURE — 99316 NF DSCHRG MGMT 30 MIN+: CPT | Performed by: NURSE PRACTITIONER

## 2024-07-08 NOTE — PROGRESS NOTES
"[facility] GERIATRIC SERVICES DISCHARGE SUMMARY    PATIENT'S NAME: Linda Loredo  YOB: 1945  MEDICAL RECORD NUMBER:  [id number]  Place of Service where encounter took place:  [facility] (TCU/SNF) [4000]    PRIMARY CARE PROVIDER AND CLINIC RESPONSIBLE AFTER TRANSFER: HEATHER    Transferring providers: Vickie Ventura NP  Recent Hospitalization/ED: [facility] 6/6 through 6/10  Date of SNF Admission: 6/10  Date of SNF (anticipated) Discharge: 7/9  Discharged to: previous assisted living  Cognitive Scores: BIMS 15/15  Physical Function: Transfers with standby assist  DME: No new equipment    CODE STATUS/ADVANCE DIRECTIVES DISCUSSION:  Full Code   ALLERGIES: Prednisone, Morphine, Morphine [fumaric acid], and Nitrofurantoin    DISCHARGE DIAGNOSIS/NURSING FACILITY COURSE:   Copied forward from previous note: admitted to the above facility from  [facility]. Hospital stay 6/6/24 through 6/10/24..   HPI:    As per DNP's note:\"78 y/o female with significant past medical hx of T2DM, hx of LLE amputation, fibromyalgia,  chronic venous stasis, paroxysmal afib on warfarin, CHF, CKD, chronic opioid use who presented to hospital on 6/6/2024 with concerns for RLE cellulitis. Redness and swelling rapidly expanded. Initially treated with IV Zosyn, Vancomycin. Urine culture also grew Providencia rettgeri resistant to zosyn.  IV AB switched to Rocephin.   Had some hypoglycemic episodes, Ozempic and Tresiba held.  Resumed Tresiba at hospital discharge. INR elevated, Warfarin held. Creat was up slightly, not true GUSTAVO. Deconditioned and needing IV antibiotics, therefore transferred to this TCU.\"  UPDATES SINCE ADMISSION TO TCU:   6/17 MD visit. -  USG LUE  - Naproxen 500 mg bid x 5 days. Take with food.   - hold coumadin, recheck on 6/19 6/18 ultrasound positive for superficial clot. No New Orders.   6/20 completed antibiotics and PICC line removed.  6/24 " Therapy reports requires contact-guard with a sliding board pivot transfer. Requires 2 people for transfers. Upper body dressing is independent. Lower body dressing is max assist.     Dermatitis associated with moisture  Continue barrier cream. Encouraged to allow help for pericare.     Opioid dependence, uncomplicated (H)  Continues on home doses of methadone.    Chronic atrial fibrillation (H)  Long term (current) use of anticoagulants  Warfarin home dosing of 3 mg/day.  Due to antibiotic use reading is elevated will hold dose for 3 days and recheck on Thursday.    Cellulitis of right lower extremity  Venous ulcer (H)  Patient was treated with IV Rocephin and completed antibiotics.  On 7/3 she developed a new worsening venous ulcer and was started on Keflex for 10 days.  Today patient complains of feeling dry.  Encouraged patient to allow lab draw but she declines.      Chronic right-sided heart failure (H)  No current concerns.  Weights have been stable.     Type 2 diabetes mellitus with diabetic polyneuropathy, with long-term current use of insulin (H)  Continue with PTA regimen.     Lymphedema  Continue with wound care and diuretics.  Cellulitis improving.     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)  Complicating rehab efforts.    PAST MEDICAL HISTORY:  has a past medical history of Depressive disorder, not elsewhere classified, Herpes zoster without mention of complication, Hypercalcemia (02/24/2007), Mild intermittent asthma, Mixed hyperlipidemia due to type 2 diabetes mellitus (H) (04/08/2008), Other urinary incontinence, Type II or unspecified type diabetes mellitus with renal manifestations, uncontrolled(250.42) (H), Type II or unspecified type diabetes mellitus without mention of complication, not stated as uncontrolled, and Unspecified essential hypertension.    DISCHARGE MEDICATIONS:  Current Outpatient Medications   Medication Sig Dispense Refill    acetaminophen (TYLENOL) 500 MG  "tablet Take 1-2 tablets (500-1,000 mg) by mouth every 8 hours as needed for mild pain      atorvastatin (LIPITOR) 40 MG tablet Take 1 tablet (40 mg) by mouth every evening      insulin degludec (TRESIBA) 200 UNIT/ML pen Inject 34 Units Subcutaneous every morning Hold for blood glucose less than 100      melatonin 1 MG TABS tablet Take 1 tablet (1 mg) by mouth nightly as needed for sleep      methadone (DOLOPHINE) 5 MG tablet Take 1 tablet (5 mg) by mouth 4 times daily 0700, 1100, 1500, 2000 40 tablet 0    metolazone (ZAROXOLYN) 2.5 MG tablet Take 1 tablet (2.5 mg) by mouth once a week Give on thursdays 4 tablet 2    miconazole (MICATIN) 2 % external powder Apply topically 2 times daily      ondansetron (ZOFRAN ODT) 4 MG ODT tab Take 1 tablet (4 mg) by mouth every 6 hours as needed for nausea or vomiting      polyethylene glycol (MIRALAX) 17 GM/Dose powder Take 17 g by mouth 2 times daily as needed for constipation      potassium chloride ER (K-TAB) 20 MEQ CR tablet Take 20 mEq by mouth daily      senna-docusate (SENOKOT-S/PERICOLACE) 8.6-50 MG tablet Take 1 tablet by mouth 2 times daily as needed for constipation      torsemide (DEMADEX) 20 MG tablet Take 3 tablets (60 mg) by mouth daily HOLD if SBP<100      mineral oil-hydrophilic petrolatum (AQUAPHOR) external ointment Apply to left thigh wound BID      multivitamin (CENTRUM SILVER) tablet Take 1 tablet by mouth daily      WARFARIN SODIUM PO 7/8 INR >8. Patient refused lab draw. Hold warfarin x 3 days and recheck INR.         MEDICATION CHANGES/RATIONALE:   Keflex added for cellulitis     ROS:    4 point ROS including Respiratory, CV, GI and , other than that noted in the HPI,  is negative    Physical Exam:   Vitals: BP (!) 154/96   Pulse 104   Temp 97.9  F (36.6  C)   Resp 18   Ht 1.676 m (5' 6\")   Wt 99 kg (218 lb 3.2 oz)   SpO2 92%   BMI 35.22 kg/m    BMI= Body mass index is 35.22 kg/m .  Alert. In no distress.     DISCHARGE PLAN:  Occupational " Therapy, Physical Therapy, Registered Nurse, and Home Health Aide  Patient instructed to follow-up with:  PCP in 7 days      Current [place] scheduled appointments:  Future Appointments   Date Time Provider Department Center   7/11/2024  8:20 AM [facility]       Pending labs: none  SNF labs: none  Discharge Instructions:  -Check blood sugar before meals and at bedtime  -Zinc oxide every 2 hours with brief changes  -Right leg wound-cleanse with wound cleanser, apply Vaseline gauze, then ABD and then wrapped with Kerlix.  Change daily  - Ok to discharge to home with current medications and treatments  - Home Physical Therapy / Ocupational Therapy / RN / HHA  - Follow up with Primary care provider in 1 week    TOTAL DISCHARGE TIME:   Greater than 30 minutes  Electronically signed by: Vickie Ventura NP    Documentation of Face to Face and Certification for Home Health Services  I certify that services are/were furnished while this patient was under the care of a physician and that a physician or an allowed non-physician practitioner (NPP), had a face-to-face encounter that meets the physician face-to-face encounter requirements. The encounter was in whole, or in part, related to the primary reason for home health. The patient is confined to his/her home and needs intermittent skilled nursing, physical therapy, speech-language pathology, or the continued need for occupational therapy. A plan of care has been established by a physician and is periodically reviewed by a physician.  Date of Face-to-Face Encounter:  7/8/2024.    I certify that, based on my findings, the following services are medically necessary home health services: Nursing, Occupational Therapy, and Physical Therapy.    My clinical findings support the need for the above skilled services because: Requires assistance of another person or specialized equipment to access medical services because patient: Requires supervision of  another for safe transfer...    Patient to re-establish plan of care with their PCP within 7-10 days after leaving the facility to reestablish care.  Medicare certified PECOS provider: Vickie Ventura NP   Date: July 8, 2024

## 2024-07-08 NOTE — LETTER
" 7/8/2024      Linda Loredo  [id number] [address]        [facility] GERIATRIC SERVICES DISCHARGE SUMMARY    PATIENT'S NAME: Linda Loredo  YOB: 1945  MEDICAL RECORD NUMBER:  [id number]  Place of Service where encounter took place:  [facility] (TCU/SNF) [4000]    PRIMARY CARE PROVIDER AND CLINIC RESPONSIBLE AFTER TRANSFER: HEATHER    Transferring providers: Vickie Ventura NP  Recent Hospitalization/ED: [facility] 6/6 through 6/10  Date of SNF Admission: 6/10  Date of SNF (anticipated) Discharge: 7/9  Discharged to: previous assisted living  Cognitive Scores: BIMS 15/15  Physical Function: Transfers with standby assist  DME: No new equipment    CODE STATUS/ADVANCE DIRECTIVES DISCUSSION:  Full Code   ALLERGIES: Prednisone, Morphine, Morphine [fumaric acid], and Nitrofurantoin    DISCHARGE DIAGNOSIS/NURSING FACILITY COURSE:   Copied forward from previous note: admitted to the above facility from  [facility]. Hospital stay 6/6/24 through 6/10/24..   HPI:    As per DNP's note:\"80 y/o female with significant past medical hx of T2DM, hx of LLE amputation, fibromyalgia,  chronic venous stasis, paroxysmal afib on warfarin, CHF, CKD, chronic opioid use who presented to hospital on 6/6/2024 with concerns for RLE cellulitis. Redness and swelling rapidly expanded. Initially treated with IV Zosyn, Vancomycin. Urine culture also grew Providencia rettgeri resistant to zosyn.  IV AB switched to Rocephin.   Had some hypoglycemic episodes, Ozempic and Tresiba held.  Resumed Tresiba at hospital discharge. INR elevated, Warfarin held. Creat was up slightly, not true GUSTAVO. Deconditioned and needing IV antibiotics, therefore transferred to this TCU.\"  UPDATES SINCE ADMISSION TO TCU:   6/17 MD visit. -  USG LUE  - Naproxen 500 mg bid x 5 days. Take with food.   - hold coumadin, recheck on 6/19 6/18 ultrasound positive for " superficial clot. No New Orders.   6/20 completed antibiotics and PICC line removed.  6/24 Therapy reports requires contact-guard with a sliding board pivot transfer. Requires 2 people for transfers. Upper body dressing is independent. Lower body dressing is max assist.     Dermatitis associated with moisture  Continue barrier cream. Encouraged to allow help for pericare.     Opioid dependence, uncomplicated (H)  Continues on home doses of methadone.    Chronic atrial fibrillation (H)  Long term (current) use of anticoagulants  Warfarin home dosing of 3 mg/day.  Due to antibiotic use reading is elevated will hold dose for 3 days and recheck on Thursday.    Cellulitis of right lower extremity  Venous ulcer (H)  Patient was treated with IV Rocephin and completed antibiotics.  On 7/3 she developed a new worsening venous ulcer and was started on Keflex for 10 days.  Today patient complains of feeling dry.  Encouraged patient to allow lab draw but she declines.      Chronic right-sided heart failure (H)  No current concerns.  Weights have been stable.     Type 2 diabetes mellitus with diabetic polyneuropathy, with long-term current use of insulin (H)  Continue with PTA regimen.     Lymphedema  Continue with wound care and diuretics.  Cellulitis improving.     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)  Complicating rehab efforts.    PAST MEDICAL HISTORY:  has a past medical history of Depressive disorder, not elsewhere classified, Herpes zoster without mention of complication, Hypercalcemia (02/24/2007), Mild intermittent asthma, Mixed hyperlipidemia due to type 2 diabetes mellitus (H) (04/08/2008), Other urinary incontinence, Type II or unspecified type diabetes mellitus with renal manifestations, uncontrolled(250.42) (H), Type II or unspecified type diabetes mellitus without mention of complication, not stated as uncontrolled, and Unspecified essential hypertension.    DISCHARGE MEDICATIONS:  Current  "Outpatient Medications   Medication Sig Dispense Refill     acetaminophen (TYLENOL) 500 MG tablet Take 1-2 tablets (500-1,000 mg) by mouth every 8 hours as needed for mild pain       atorvastatin (LIPITOR) 40 MG tablet Take 1 tablet (40 mg) by mouth every evening       insulin degludec (TRESIBA) 200 UNIT/ML pen Inject 34 Units Subcutaneous every morning Hold for blood glucose less than 100       melatonin 1 MG TABS tablet Take 1 tablet (1 mg) by mouth nightly as needed for sleep       methadone (DOLOPHINE) 5 MG tablet Take 1 tablet (5 mg) by mouth 4 times daily 0700, 1100, 1500, 2000 40 tablet 0     metolazone (ZAROXOLYN) 2.5 MG tablet Take 1 tablet (2.5 mg) by mouth once a week Give on thursdays 4 tablet 2     miconazole (MICATIN) 2 % external powder Apply topically 2 times daily       ondansetron (ZOFRAN ODT) 4 MG ODT tab Take 1 tablet (4 mg) by mouth every 6 hours as needed for nausea or vomiting       polyethylene glycol (MIRALAX) 17 GM/Dose powder Take 17 g by mouth 2 times daily as needed for constipation       potassium chloride ER (K-TAB) 20 MEQ CR tablet Take 20 mEq by mouth daily       senna-docusate (SENOKOT-S/PERICOLACE) 8.6-50 MG tablet Take 1 tablet by mouth 2 times daily as needed for constipation       torsemide (DEMADEX) 20 MG tablet Take 3 tablets (60 mg) by mouth daily HOLD if SBP<100       mineral oil-hydrophilic petrolatum (AQUAPHOR) external ointment Apply to left thigh wound BID       multivitamin (CENTRUM SILVER) tablet Take 1 tablet by mouth daily       WARFARIN SODIUM PO 7/8 INR >8. Patient refused lab draw. Hold warfarin x 3 days and recheck INR.         MEDICATION CHANGES/RATIONALE:   Keflex added for cellulitis     ROS:    4 point ROS including Respiratory, CV, GI and , other than that noted in the HPI,  is negative    Physical Exam:   Vitals: BP (!) 154/96   Pulse 104   Temp 97.9  F (36.6  C)   Resp 18   Ht 1.676 m (5' 6\")   Wt 99 kg (218 lb 3.2 oz)   SpO2 92%   BMI 35.22 kg/m  "   BMI= Body mass index is 35.22 kg/m .  Alert. In no distress.     DISCHARGE PLAN:  Occupational Therapy, Physical Therapy, Registered Nurse, and Home Health Aide  Patient instructed to follow-up with:  PCP in 7 days      [facility] scheduled appointments:  Future Appointments   Date Time Provider Department Center   7/11/2024  8:20 AM [facility]       Pending labs: none  SNF labs: none  Discharge Instructions:  -Check blood sugar before meals and at bedtime  -Zinc oxide every 2 hours with brief changes  -Right leg wound-cleanse with wound cleanser, apply Vaseline gauze, then ABD and then wrapped with Kerlix.  Change daily  - Ok to discharge to home with current medications and treatments  - Home Physical Therapy / Ocupational Therapy / RN / HHA  - Follow up with Primary care provider in 1 week    TOTAL DISCHARGE TIME:   Greater than 30 minutes  Electronically signed by: Vickie Ventura NP    Documentation of Face to Face and Certification for Home Health Services  I certify that services are/were furnished while this patient was under the care of a physician and that a physician or an allowed non-physician practitioner (NPP), had a face-to-face encounter that meets the physician face-to-face encounter requirements. The encounter was in whole, or in part, related to the primary reason for home health. The patient is confined to his/her home and needs intermittent skilled nursing, physical therapy, speech-language pathology, or the continued need for occupational therapy. A plan of care has been established by a physician and is periodically reviewed by a physician.  Date of Face-to-Face Encounter:  7/8/2024.    I certify that, based on my findings, the following services are medically necessary home health services: Nursing, Occupational Therapy, and Physical Therapy.    My clinical findings support the need for the above skilled services because: Requires assistance of another  person or specialized equipment to access medical services because patient: Requires supervision of another for safe transfer...    Patient to re-establish plan of care with their PCP within 7-10 days after leaving the facility to reestablish care.  Medicare certified PECOS provider: Vickie Ventura NP   Date: July 8, 2024      Sincerely,        Vickie Ventura NP

## 2024-07-10 ENCOUNTER — HOSPITAL ENCOUNTER (INPATIENT)
Facility: CLINIC | Age: 79
LOS: 5 days | Discharge: NURSING, REHAB OR LONG-TERM CARE | DRG: 853 | End: 2024-07-16
Attending: EMERGENCY MEDICINE | Admitting: INTERNAL MEDICINE
Payer: COMMERCIAL

## 2024-07-10 DIAGNOSIS — L03.115 CELLULITIS OF RIGHT LOWER EXTREMITY: ICD-10-CM

## 2024-07-10 DIAGNOSIS — L97.911 ULCER OF RIGHT LOWER EXTREMITY, LIMITED TO BREAKDOWN OF SKIN (H): ICD-10-CM

## 2024-07-10 DIAGNOSIS — G89.29 OTHER CHRONIC PAIN: ICD-10-CM

## 2024-07-10 DIAGNOSIS — I48.0 PAROXYSMAL ATRIAL FIBRILLATION (H): ICD-10-CM

## 2024-07-10 DIAGNOSIS — I87.8 VENOUS STASIS: ICD-10-CM

## 2024-07-10 DIAGNOSIS — A41.9 SEPSIS, DUE TO UNSPECIFIED ORGANISM, UNSPECIFIED WHETHER ACUTE ORGAN DYSFUNCTION PRESENT (H): ICD-10-CM

## 2024-07-10 DIAGNOSIS — G93.40 ENCEPHALOPATHY: Primary | ICD-10-CM

## 2024-07-10 DIAGNOSIS — Z79.01 ANTICOAGULATION MONITORING, INR RANGE 2-3: ICD-10-CM

## 2024-07-10 DIAGNOSIS — N17.9 ACUTE KIDNEY INJURY (H): ICD-10-CM

## 2024-07-10 DIAGNOSIS — Z79.4 TYPE 2 DIABETES MELLITUS WITH DIABETIC POLYNEUROPATHY, WITH LONG-TERM CURRENT USE OF INSULIN (H): ICD-10-CM

## 2024-07-10 DIAGNOSIS — R52 PAIN: ICD-10-CM

## 2024-07-10 DIAGNOSIS — F11.20 OPIOID DEPENDENCE, UNCOMPLICATED (H): ICD-10-CM

## 2024-07-10 DIAGNOSIS — E11.42 DIABETIC POLYNEUROPATHY ASSOCIATED WITH TYPE 2 DIABETES MELLITUS (H): ICD-10-CM

## 2024-07-10 DIAGNOSIS — L89.890: ICD-10-CM

## 2024-07-10 DIAGNOSIS — Z79.01 LONG TERM CURRENT USE OF ANTICOAGULANT THERAPY: ICD-10-CM

## 2024-07-10 DIAGNOSIS — L89.152 PRESSURE INJURY OF SACRAL REGION, STAGE 2 (H): ICD-10-CM

## 2024-07-10 DIAGNOSIS — E11.42 TYPE 2 DIABETES MELLITUS WITH DIABETIC POLYNEUROPATHY, WITH LONG-TERM CURRENT USE OF INSULIN (H): ICD-10-CM

## 2024-07-10 PROCEDURE — 99285 EMERGENCY DEPT VISIT HI MDM: CPT | Performed by: EMERGENCY MEDICINE

## 2024-07-10 PROCEDURE — 99285 EMERGENCY DEPT VISIT HI MDM: CPT | Mod: 25 | Performed by: EMERGENCY MEDICINE

## 2024-07-10 NOTE — LETTER
Transition Communication Hand-off for Care Transitions to Next Level of Care Provider    Name: Linda Loredo  : 1945  MRN #: [id number]  Primary Care Provider: [facility]     Primary Clinic: [address] [place] [id number]     Reason for Hospitalization:  Other chronic pain [G89.29]  Venous stasis [I87.8]  Paroxysmal atrial fibrillation (H) [I48.0]  Anticoagulation monitoring, INR range 2-3 [Z79.01]  Acute kidney injury (H24) [N17.9]  Cellulitis of right lower extremity [L03.115]  Diabetic polyneuropathy associated with type 2 diabetes mellitus (H) [E11.42]  Sepsis, due to unspecified organism, unspecified whether acute organ dysfunction present (H) [A41.9]  Admit Date/Time: 7/10/2024 11:31 PM  Discharge Date: 24  Payor Source: Payor: [facility] / Plan: ARE MEDICARE / Product Type: HMO /              Reason for Communication Hand-off Referral: Fragility    Discharge Plan:  Discharge Plan:      Flowsheet Row Most Recent Value   Disposition Comments [facility] TCU             Concern for non-adherence with plan of care:   Y/N N  Discharge Needs Assessment:  Needs      Flowsheet Row Most Recent Value   Equipment Currently Used at Home grab bar, toilet, glucometer, raised toilet seat, lift device, wheelchair, manual   # of Referrals Placed by CM External Care Coordination, Transportation              Follow-up plan:  No future appointments.    Any outstanding tests or procedures:        Referrals       Future Labs/Procedures    Occupational Therapy Adult Consult     Comments:    Evaluate and treat as clinically indicated.    Reason:  Physical deconditioning    Physical Therapy Adult Consult     Comments:    Evaluate and treat as clinically indicated.    Reason:  Physical deconditioning              Key Recommendations:  TCU    Caitlin Serrano RN    AVS/Discharge Summary is the source of truth; this is a helpful guide for improved communication  of patient story

## 2024-07-11 PROBLEM — L97.509 DIABETIC FOOT ULCER (H): Status: RESOLVED | Noted: 2023-09-29 | Resolved: 2024-07-11

## 2024-07-11 PROBLEM — M86.9 OSTEOMYELITIS (H): Status: RESOLVED | Noted: 2023-10-24 | Resolved: 2024-07-11

## 2024-07-11 PROBLEM — R11.0 NAUSEA: Status: RESOLVED | Noted: 2023-11-15 | Resolved: 2024-07-11

## 2024-07-11 PROBLEM — L89.90 DECUBITUS ULCERS: Status: RESOLVED | Noted: 2023-05-27 | Resolved: 2024-07-11

## 2024-07-11 PROBLEM — A41.9 SEPSIS WITHOUT ACUTE ORGAN DYSFUNCTION, DUE TO UNSPECIFIED ORGANISM (H): Status: RESOLVED | Noted: 2023-05-26 | Resolved: 2024-07-11

## 2024-07-11 PROBLEM — T45.515A ADVERSE EFFECT OF ANTICOAGULANTS, INITIAL ENCOUNTER: Status: RESOLVED | Noted: 2024-04-16 | Resolved: 2024-07-11

## 2024-07-11 PROBLEM — N39.0 UTI (URINARY TRACT INFECTION): Status: RESOLVED | Noted: 2023-05-26 | Resolved: 2024-07-11

## 2024-07-11 PROBLEM — S91.302D UNSPECIFIED OPEN WOUND, LEFT FOOT, SUBSEQUENT ENCOUNTER: Status: RESOLVED | Noted: 2023-09-22 | Resolved: 2024-07-11

## 2024-07-11 PROBLEM — L89.156 PRESSURE INJURY OF DEEP TISSUE OF SACRAL REGION: Status: ACTIVE | Noted: 2024-07-11

## 2024-07-11 PROBLEM — T45.515A WARFARIN-INDUCED COAGULOPATHY (H): Status: RESOLVED | Noted: 2023-05-27 | Resolved: 2024-07-11

## 2024-07-11 PROBLEM — I48.20 CHRONIC ATRIAL FIBRILLATION (H): Status: ACTIVE | Noted: 2022-05-30

## 2024-07-11 PROBLEM — L03.317 CELLULITIS OF BUTTOCK: Status: RESOLVED | Noted: 2023-05-26 | Resolved: 2024-07-11

## 2024-07-11 PROBLEM — Z79.01 ANTICOAGULATION MONITORING, INR RANGE 2-3: Chronic | Status: ACTIVE | Noted: 2022-07-06

## 2024-07-11 PROBLEM — D68.32 WARFARIN-INDUCED COAGULOPATHY (H): Status: RESOLVED | Noted: 2023-05-27 | Resolved: 2024-07-11

## 2024-07-11 PROBLEM — N17.9 AKI (ACUTE KIDNEY INJURY) (H): Status: RESOLVED | Noted: 2023-05-26 | Resolved: 2024-07-11

## 2024-07-11 PROBLEM — A41.9 SEPSIS, DUE TO UNSPECIFIED ORGANISM, UNSPECIFIED WHETHER ACUTE ORGAN DYSFUNCTION PRESENT (H): Status: ACTIVE | Noted: 2024-07-11

## 2024-07-11 PROBLEM — E16.2 HYPOGLYCEMIA: Status: RESOLVED | Noted: 2023-05-27 | Resolved: 2024-07-11

## 2024-07-11 PROBLEM — N18.32 CHRONIC KIDNEY DISEASE, STAGE 3B (H): Chronic | Status: ACTIVE | Noted: 2024-06-12

## 2024-07-11 PROBLEM — L02.612 ABSCESS OF LEFT FOOT: Status: RESOLVED | Noted: 2022-10-31 | Resolved: 2024-07-11

## 2024-07-11 PROBLEM — E11.621 DIABETIC FOOT ULCER (H): Status: RESOLVED | Noted: 2023-09-29 | Resolved: 2024-07-11

## 2024-07-11 PROBLEM — M62.81 MUSCLE WEAKNESS (GENERALIZED): Status: RESOLVED | Noted: 2023-11-15 | Resolved: 2024-07-11

## 2024-07-11 PROBLEM — E86.0 DEHYDRATION: Status: RESOLVED | Noted: 2023-05-26 | Resolved: 2024-07-11

## 2024-07-11 PROBLEM — L03.90 CELLULITIS: Status: RESOLVED | Noted: 2023-05-27 | Resolved: 2024-07-11

## 2024-07-11 PROBLEM — S91.302A NON-HEALING WOUND OF LEFT HEEL: Status: RESOLVED | Noted: 2023-09-22 | Resolved: 2024-07-11

## 2024-07-11 PROBLEM — G93.41 METABOLIC ENCEPHALOPATHY: Status: ACTIVE | Noted: 2024-07-11

## 2024-07-11 PROBLEM — D64.9 ANEMIA: Status: ACTIVE | Noted: 2024-07-11

## 2024-07-11 PROBLEM — T14.8XXD OTHER INJURY OF UNSPECIFIED BODY REGION, SUBSEQUENT ENCOUNTER: Status: RESOLVED | Noted: 2023-11-15 | Resolved: 2024-07-11

## 2024-07-11 PROBLEM — R79.1 SUPRATHERAPEUTIC INR: Status: RESOLVED | Noted: 2023-05-27 | Resolved: 2024-07-11

## 2024-07-11 PROBLEM — F11.20 OPIOID DEPENDENCE, UNCOMPLICATED (H): Chronic | Status: ACTIVE | Noted: 2023-04-26

## 2024-07-11 PROBLEM — I87.8 VENOUS STASIS: Chronic | Status: ACTIVE | Noted: 2024-04-16

## 2024-07-11 PROBLEM — G89.29 OTHER CHRONIC PAIN: Status: ACTIVE | Noted: 2024-07-11

## 2024-07-11 PROBLEM — N17.9 ACUTE KIDNEY INJURY (H): Status: ACTIVE | Noted: 2024-07-11

## 2024-07-11 PROBLEM — I27.20 PULMONARY HYPERTENSION (H): Chronic | Status: ACTIVE | Noted: 2024-07-11

## 2024-07-11 PROBLEM — L97.912 SKIN ULCER OF RIGHT LOWER LEG WITH FAT LAYER EXPOSED (H): Status: RESOLVED | Noted: 2024-02-26 | Resolved: 2024-07-11

## 2024-07-11 PROBLEM — N30.00 ACUTE CYSTITIS WITHOUT HEMATURIA: Status: ACTIVE | Noted: 2024-07-11

## 2024-07-11 PROBLEM — Z89.512 STATUS POST BELOW-KNEE AMPUTATION OF LEFT LOWER EXTREMITY (H): Chronic | Status: ACTIVE | Noted: 2024-07-11

## 2024-07-11 PROBLEM — I48.20 CHRONIC ATRIAL FIBRILLATION (H): Chronic | Status: ACTIVE | Noted: 2022-05-30

## 2024-07-11 LAB
ALBUMIN SERPL BCG-MCNC: 2.9 G/DL (ref 3.5–5.2)
ALBUMIN UR-MCNC: NEGATIVE MG/DL
ALP SERPL-CCNC: 101 U/L (ref 40–150)
ALT SERPL W P-5'-P-CCNC: 21 U/L (ref 0–50)
ANION GAP SERPL CALCULATED.3IONS-SCNC: 13 MMOL/L (ref 7–15)
APPEARANCE UR: ABNORMAL
AST SERPL W P-5'-P-CCNC: 56 U/L (ref 0–45)
BASE EXCESS BLDV CALC-SCNC: 12.2 MMOL/L (ref -3–3)
BASOPHILS # BLD AUTO: 0 10E3/UL (ref 0–0.2)
BASOPHILS NFR BLD AUTO: 0 %
BILIRUB SERPL-MCNC: 0.7 MG/DL
BILIRUB UR QL STRIP: NEGATIVE
BUN SERPL-MCNC: 84.1 MG/DL (ref 8–23)
CALCIUM SERPL-MCNC: 9.9 MG/DL (ref 8.8–10.2)
CHLORIDE SERPL-SCNC: 88 MMOL/L (ref 98–107)
COLOR UR AUTO: YELLOW
CREAT SERPL-MCNC: 2 MG/DL (ref 0.51–0.95)
DEPRECATED HCO3 PLAS-SCNC: 31 MMOL/L (ref 22–29)
EGFRCR SERPLBLD CKD-EPI 2021: 25 ML/MIN/1.73M2
EOSINOPHIL # BLD AUTO: 0.2 10E3/UL (ref 0–0.7)
EOSINOPHIL NFR BLD AUTO: 2 %
ERYTHROCYTE [DISTWIDTH] IN BLOOD BY AUTOMATED COUNT: 15.4 % (ref 10–15)
FERRITIN SERPL-MCNC: 189 NG/ML (ref 11–328)
GLUCOSE BLDC GLUCOMTR-MCNC: 102 MG/DL (ref 70–99)
GLUCOSE BLDC GLUCOMTR-MCNC: 111 MG/DL (ref 70–99)
GLUCOSE BLDC GLUCOMTR-MCNC: 152 MG/DL (ref 70–99)
GLUCOSE BLDC GLUCOMTR-MCNC: 49 MG/DL (ref 70–99)
GLUCOSE SERPL-MCNC: 77 MG/DL (ref 70–99)
GLUCOSE UR STRIP-MCNC: NEGATIVE MG/DL
HCO3 BLDV-SCNC: 38 MMOL/L (ref 21–28)
HCT VFR BLD AUTO: 31.1 % (ref 35–47)
HGB BLD-MCNC: 10.3 G/DL (ref 11.7–15.7)
HGB UR QL STRIP: ABNORMAL
HOLD SPECIMEN: NORMAL
HOLD SPECIMEN: NORMAL
IMM GRANULOCYTES # BLD: 0 10E3/UL
IMM GRANULOCYTES NFR BLD: 0 %
INR PPP: 2.96 (ref 0.85–1.15)
IRON BINDING CAPACITY (ROCHE): 169 UG/DL (ref 240–430)
IRON SATN MFR SERPL: 30 % (ref 15–46)
IRON SERPL-MCNC: 51 UG/DL (ref 37–145)
KETONES UR STRIP-MCNC: NEGATIVE MG/DL
LACTATE SERPL-SCNC: 1.4 MMOL/L (ref 0.7–2)
LEUKOCYTE ESTERASE UR QL STRIP: ABNORMAL
LYMPHOCYTES # BLD AUTO: 2 10E3/UL (ref 0.8–5.3)
LYMPHOCYTES NFR BLD AUTO: 18 %
MCH RBC QN AUTO: 29.9 PG (ref 26.5–33)
MCHC RBC AUTO-ENTMCNC: 33.1 G/DL (ref 31.5–36.5)
MCV RBC AUTO: 90 FL (ref 78–100)
MONOCYTES # BLD AUTO: 1 10E3/UL (ref 0–1.3)
MONOCYTES NFR BLD AUTO: 9 %
NEUTROPHILS # BLD AUTO: 7.9 10E3/UL (ref 1.6–8.3)
NEUTROPHILS NFR BLD AUTO: 71 %
NITRATE UR QL: NEGATIVE
O2/TOTAL GAS SETTING VFR VENT: 21 %
OXYHGB MFR BLDV: 46 % (ref 70–75)
PCO2 BLDV: 54 MM HG (ref 40–50)
PH BLDV: 7.46 [PH] (ref 7.32–7.43)
PH UR STRIP: 6 [PH] (ref 5–7)
PLATELET # BLD AUTO: 289 10E3/UL (ref 150–450)
PO2 BLDV: 28 MM HG (ref 25–47)
POTASSIUM SERPL-SCNC: 3.6 MMOL/L (ref 3.4–5.3)
PROCALCITONIN SERPL IA-MCNC: 0.25 NG/ML
PROT SERPL-MCNC: 8.3 G/DL (ref 6.4–8.3)
RBC # BLD AUTO: 3.44 10E6/UL (ref 3.8–5.2)
RBC URINE: 24 /HPF
SAO2 % BLDV: 47.3 % (ref 70–75)
SODIUM SERPL-SCNC: 132 MMOL/L (ref 135–145)
SP GR UR STRIP: 1.01 (ref 1–1.03)
SQUAMOUS EPITHELIAL: 3 /HPF
UROBILINOGEN UR STRIP-MCNC: NORMAL MG/DL
VIT B12 SERPL-MCNC: 1349 PG/ML (ref 232–1245)
WBC # BLD AUTO: 11.1 10E3/UL (ref 4–11)
WBC CLUMPS #/AREA URNS HPF: PRESENT /HPF
WBC URINE: >182 /HPF

## 2024-07-11 PROCEDURE — 36415 COLL VENOUS BLD VENIPUNCTURE: CPT | Performed by: INTERNAL MEDICINE

## 2024-07-11 PROCEDURE — 83550 IRON BINDING TEST: CPT | Performed by: INTERNAL MEDICINE

## 2024-07-11 PROCEDURE — 80053 COMPREHEN METABOLIC PANEL: CPT | Performed by: EMERGENCY MEDICINE

## 2024-07-11 PROCEDURE — 96360 HYDRATION IV INFUSION INIT: CPT | Performed by: EMERGENCY MEDICINE

## 2024-07-11 PROCEDURE — 99207 PR NOT IN PERSON INPATIENT CONSULT STATISTICAL MARKER: CPT | Performed by: INTERNAL MEDICINE

## 2024-07-11 PROCEDURE — 82805 BLOOD GASES W/O2 SATURATION: CPT | Performed by: INTERNAL MEDICINE

## 2024-07-11 PROCEDURE — 99223 1ST HOSP IP/OBS HIGH 75: CPT | Mod: 95 | Performed by: INTERNAL MEDICINE

## 2024-07-11 PROCEDURE — 250N000013 HC RX MED GY IP 250 OP 250 PS 637: Performed by: EMERGENCY MEDICINE

## 2024-07-11 PROCEDURE — 250N000013 HC RX MED GY IP 250 OP 250 PS 637: Performed by: INTERNAL MEDICINE

## 2024-07-11 PROCEDURE — 84145 PROCALCITONIN (PCT): CPT | Performed by: EMERGENCY MEDICINE

## 2024-07-11 PROCEDURE — 250N000011 HC RX IP 250 OP 636: Performed by: EMERGENCY MEDICINE

## 2024-07-11 PROCEDURE — 258N000003 HC RX IP 258 OP 636: Performed by: EMERGENCY MEDICINE

## 2024-07-11 PROCEDURE — 81001 URINALYSIS AUTO W/SCOPE: CPT | Performed by: INTERNAL MEDICINE

## 2024-07-11 PROCEDURE — 83605 ASSAY OF LACTIC ACID: CPT | Performed by: EMERGENCY MEDICINE

## 2024-07-11 PROCEDURE — 82607 VITAMIN B-12: CPT | Performed by: INTERNAL MEDICINE

## 2024-07-11 PROCEDURE — 120N000001 HC R&B MED SURG/OB

## 2024-07-11 PROCEDURE — 99207 PR APP CREDIT; MD BILLING SHARED VISIT: CPT | Mod: FS | Performed by: INTERNAL MEDICINE

## 2024-07-11 PROCEDURE — 258N000001 HC RX 258: Performed by: INTERNAL MEDICINE

## 2024-07-11 PROCEDURE — 85025 COMPLETE CBC W/AUTO DIFF WBC: CPT | Performed by: EMERGENCY MEDICINE

## 2024-07-11 PROCEDURE — 85610 PROTHROMBIN TIME: CPT | Performed by: EMERGENCY MEDICINE

## 2024-07-11 PROCEDURE — 82728 ASSAY OF FERRITIN: CPT | Performed by: INTERNAL MEDICINE

## 2024-07-11 PROCEDURE — 87040 BLOOD CULTURE FOR BACTERIA: CPT | Performed by: EMERGENCY MEDICINE

## 2024-07-11 PROCEDURE — 36415 COLL VENOUS BLD VENIPUNCTURE: CPT | Performed by: EMERGENCY MEDICINE

## 2024-07-11 PROCEDURE — 87086 URINE CULTURE/COLONY COUNT: CPT | Performed by: INTERNAL MEDICINE

## 2024-07-11 PROCEDURE — 93005 ELECTROCARDIOGRAM TRACING: CPT

## 2024-07-11 RX ORDER — MULTIVITAMIN,THERAPEUTIC
1 TABLET ORAL DAILY
Status: DISCONTINUED | OUTPATIENT
Start: 2024-07-11 | End: 2024-07-16 | Stop reason: HOSPADM

## 2024-07-11 RX ORDER — CEPHALEXIN 500 MG/1
CAPSULE ORAL
Status: ON HOLD | COMMUNITY
Start: 2024-07-09 | End: 2024-07-16

## 2024-07-11 RX ORDER — POTASSIUM CHLORIDE 1500 MG/1
20 TABLET, EXTENDED RELEASE ORAL DAILY
Status: DISCONTINUED | OUTPATIENT
Start: 2024-07-11 | End: 2024-07-11

## 2024-07-11 RX ORDER — CALCIUM CARBONATE 500 MG/1
1000 TABLET, CHEWABLE ORAL 4 TIMES DAILY PRN
Status: DISCONTINUED | OUTPATIENT
Start: 2024-07-11 | End: 2024-07-16 | Stop reason: HOSPADM

## 2024-07-11 RX ORDER — ACETAMINOPHEN 500 MG
1 TABLET ORAL EVERY 8 HOURS PRN
Status: ON HOLD | COMMUNITY
End: 2024-07-16

## 2024-07-11 RX ORDER — METHADONE HYDROCHLORIDE 5 MG/1
5 TABLET ORAL 4 TIMES DAILY
Status: DISCONTINUED | OUTPATIENT
Start: 2024-07-11 | End: 2024-07-16 | Stop reason: HOSPADM

## 2024-07-11 RX ORDER — MULTIVITAMIN WITH FOLIC ACID 400 MCG
1 TABLET ORAL
COMMUNITY
Start: 2024-07-09

## 2024-07-11 RX ORDER — TORSEMIDE 20 MG/1
60 TABLET ORAL DAILY
Status: DISCONTINUED | OUTPATIENT
Start: 2024-07-11 | End: 2024-07-16 | Stop reason: HOSPADM

## 2024-07-11 RX ORDER — METOLAZONE 2.5 MG/1
2.5 TABLET ORAL WEEKLY
Status: DISCONTINUED | OUTPATIENT
Start: 2024-07-11 | End: 2024-07-16 | Stop reason: HOSPADM

## 2024-07-11 RX ORDER — NICOTINE POLACRILEX 4 MG
15-30 LOZENGE BUCCAL
Status: DISCONTINUED | OUTPATIENT
Start: 2024-07-11 | End: 2024-07-16 | Stop reason: HOSPADM

## 2024-07-11 RX ORDER — AMOXICILLIN 250 MG
2 CAPSULE ORAL 2 TIMES DAILY PRN
Status: DISCONTINUED | OUTPATIENT
Start: 2024-07-11 | End: 2024-07-16 | Stop reason: HOSPADM

## 2024-07-11 RX ORDER — POLYETHYLENE GLYCOL 3350 17 G/17G
17 POWDER, FOR SOLUTION ORAL 2 TIMES DAILY PRN
Status: DISCONTINUED | OUTPATIENT
Start: 2024-07-11 | End: 2024-07-16 | Stop reason: HOSPADM

## 2024-07-11 RX ORDER — WARFARIN SODIUM 1 MG/1
1 TABLET ORAL
Status: DISCONTINUED | OUTPATIENT
Start: 2024-07-11 | End: 2024-07-11

## 2024-07-11 RX ORDER — WARFARIN SODIUM 3 MG/1
3 TABLET ORAL
Status: COMPLETED | OUTPATIENT
Start: 2024-07-11 | End: 2024-07-11

## 2024-07-11 RX ORDER — ACETAMINOPHEN 500 MG
500-1000 TABLET ORAL EVERY 8 HOURS PRN
Status: DISCONTINUED | OUTPATIENT
Start: 2024-07-11 | End: 2024-07-16 | Stop reason: HOSPADM

## 2024-07-11 RX ORDER — MULTIVIT WITH MINERALS/LUTEIN
1 TABLET ORAL DAILY
Status: DISCONTINUED | OUTPATIENT
Start: 2024-07-11 | End: 2024-07-11

## 2024-07-11 RX ORDER — CALCIUM CARBONATE 500 MG/1
1000 TABLET, CHEWABLE ORAL 4 TIMES DAILY PRN
Status: DISCONTINUED | OUTPATIENT
Start: 2024-07-11 | End: 2024-07-11

## 2024-07-11 RX ORDER — AMOXICILLIN 250 MG
1 CAPSULE ORAL 2 TIMES DAILY PRN
Status: DISCONTINUED | OUTPATIENT
Start: 2024-07-11 | End: 2024-07-11

## 2024-07-11 RX ORDER — POTASSIUM CHLORIDE 1500 MG/1
20 TABLET, EXTENDED RELEASE ORAL DAILY
Status: DISCONTINUED | OUTPATIENT
Start: 2024-07-11 | End: 2024-07-16 | Stop reason: HOSPADM

## 2024-07-11 RX ORDER — DEXTROSE MONOHYDRATE 25 G/50ML
25-50 INJECTION, SOLUTION INTRAVENOUS
Status: DISCONTINUED | OUTPATIENT
Start: 2024-07-11 | End: 2024-07-16 | Stop reason: HOSPADM

## 2024-07-11 RX ORDER — ATORVASTATIN CALCIUM 20 MG/1
40 TABLET, FILM COATED ORAL EVERY EVENING
Status: DISCONTINUED | OUTPATIENT
Start: 2024-07-11 | End: 2024-07-16 | Stop reason: HOSPADM

## 2024-07-11 RX ORDER — LIDOCAINE 40 MG/G
CREAM TOPICAL
Status: DISCONTINUED | OUTPATIENT
Start: 2024-07-11 | End: 2024-07-16 | Stop reason: HOSPADM

## 2024-07-11 RX ORDER — PIPERACILLIN SODIUM, TAZOBACTAM SODIUM 2; .25 G/10ML; G/10ML
2.25 INJECTION, POWDER, LYOPHILIZED, FOR SOLUTION INTRAVENOUS EVERY 6 HOURS
Status: DISCONTINUED | OUTPATIENT
Start: 2024-07-11 | End: 2024-07-13

## 2024-07-11 RX ORDER — FUROSEMIDE 10 MG/ML
40 INJECTION INTRAMUSCULAR; INTRAVENOUS DAILY
Status: DISCONTINUED | OUTPATIENT
Start: 2024-07-11 | End: 2024-07-16 | Stop reason: HOSPADM

## 2024-07-11 RX ORDER — METHADONE HYDROCHLORIDE 5 MG/1
5 TABLET ORAL ONCE
Status: COMPLETED | OUTPATIENT
Start: 2024-07-11 | End: 2024-07-11

## 2024-07-11 RX ORDER — AMOXICILLIN 250 MG
1 CAPSULE ORAL 2 TIMES DAILY PRN
Status: DISCONTINUED | OUTPATIENT
Start: 2024-07-11 | End: 2024-07-16 | Stop reason: HOSPADM

## 2024-07-11 RX ADMIN — ACETAMINOPHEN 1000 MG: 500 TABLET, FILM COATED ORAL at 19:35

## 2024-07-11 RX ADMIN — THERA TABS 1 TABLET: TAB at 12:02

## 2024-07-11 RX ADMIN — PIPERACILLIN AND TAZOBACTAM 2.25 G: 2; .25 INJECTION, POWDER, FOR SOLUTION INTRAVENOUS at 18:16

## 2024-07-11 RX ADMIN — METHADONE HYDROCHLORIDE 5 MG: 5 TABLET ORAL at 01:04

## 2024-07-11 RX ADMIN — PIPERACILLIN AND TAZOBACTAM 2.25 G: 2; .25 INJECTION, POWDER, FOR SOLUTION INTRAVENOUS at 01:18

## 2024-07-11 RX ADMIN — SODIUM CHLORIDE 1000 ML: 9 INJECTION, SOLUTION INTRAVENOUS at 00:09

## 2024-07-11 RX ADMIN — DEXTROSE MONOHYDRATE 50 ML: 25 INJECTION, SOLUTION INTRAVENOUS at 08:19

## 2024-07-11 RX ADMIN — POTASSIUM CHLORIDE 20 MEQ: 1500 TABLET, EXTENDED RELEASE ORAL at 12:02

## 2024-07-11 RX ADMIN — PIPERACILLIN AND TAZOBACTAM 2.25 G: 2; .25 INJECTION, POWDER, FOR SOLUTION INTRAVENOUS at 06:49

## 2024-07-11 RX ADMIN — MICONAZOLE NITRATE: 20 POWDER TOPICAL at 12:44

## 2024-07-11 RX ADMIN — MICONAZOLE NITRATE: 20 POWDER TOPICAL at 19:36

## 2024-07-11 RX ADMIN — WARFARIN SODIUM 3 MG: 3 TABLET ORAL at 18:16

## 2024-07-11 RX ADMIN — ATORVASTATIN CALCIUM 40 MG: 20 TABLET, FILM COATED ORAL at 19:35

## 2024-07-11 RX ADMIN — PIPERACILLIN AND TAZOBACTAM 2.25 G: 2; .25 INJECTION, POWDER, FOR SOLUTION INTRAVENOUS at 12:03

## 2024-07-11 ASSESSMENT — ACTIVITIES OF DAILY LIVING (ADL)
ADLS_ACUITY_SCORE: 44
ADLS_ACUITY_SCORE: 44
ADLS_ACUITY_SCORE: 45
ADLS_ACUITY_SCORE: 45
ADLS_ACUITY_SCORE: 39
ADLS_ACUITY_SCORE: 44
ADLS_ACUITY_SCORE: 44
ADLS_ACUITY_SCORE: 40
ADLS_ACUITY_SCORE: 45
ADLS_ACUITY_SCORE: 45
DEPENDENT_IADLS:: CLEANING;COOKING;LAUNDRY;SHOPPING;MEAL PREPARATION;MEDICATION MANAGEMENT;TRANSPORTATION
ADLS_ACUITY_SCORE: 39
ADLS_ACUITY_SCORE: 45
ADLS_ACUITY_SCORE: 39
ADLS_ACUITY_SCORE: 41
ADLS_ACUITY_SCORE: 40
ADLS_ACUITY_SCORE: 45
ADLS_ACUITY_SCORE: 41
ADLS_ACUITY_SCORE: 39
ADLS_ACUITY_SCORE: 45

## 2024-07-11 NOTE — PROGRESS NOTES
Care Management Initial Consult    General Information  Assessment completed with: Lu Perez  Type of CM/SW Visit: Offer D/C Planning    Primary Care Provider verified and updated as needed: Yes   Readmission within the last 30 days: no previous admission in last 30 days         Advance Care Planning: Advance Care Planning Reviewed: present on chart          Communication Assessment  Patient's communication style: spoken language (English or Bilingual)    Hearing Difficulty or Deaf: no   Wear Glasses or Blind: yes    Cognitive  Cognitive/Neuro/Behavioral: WDL                      Living Environment:   People in home: alone     Current living Arrangements: apartment, assisted living  Name of Facility: [facility]   Able to return to prior arrangements: other (see comments) (Undetermined)       Family/Social Support:  Care provided by: other (see comments) ([facility] staff)  Provides care for: no one     Children, Facility resident(s)/Staff          Description of Support System: Supportive, Involved    Support Assessment: Adequate family and caregiver support    Current Resources:   Patient receiving home care services: Yes  Skilled Home Care Services: Skilled Nursing, Physical Therapy, Occupational Therapy  Community Resources: Home Care  Equipment currently used at home: grab bar, toilet, grab bar, tub/shower, glucometer, lift device, raised toilet seat, shower chair, wheelchair, manual  Supplies currently used at home: Incontinence Supplies, Diabetic Supplies, Wound Care Supplies    Employment/Financial:  Employment Status: retired        Financial Concerns: none           Does the patient's insurance plan have a 3 day qualifying hospital stay waiver?  No    Lifestyle & Psychosocial Needs:  Social Determinants of Health     Food Insecurity: No Food Insecurity (3/7/2022)    Received from [facility], [facility]    Food Insecurity      Worried About Running Out of Food in the Last Year: 1   Depression: Not at risk (9/12/2023)    Received from [facility] [employer] [facility], [facility] [employer]    PHQ-2     PHQ-2 TOTAL SCORE: 1   Housing Stability: Low Risk  (3/7/2022)    Received from [facility] [employer] [facility], [facility]    Housing Stability     Unable to Pay for Housing in the Last Year: 1   Tobacco Use: Low Risk  (4/1/2024)    Received from [facility] [employer] [facility]    Patient History     Smoking Tobacco Use: Never     Smokeless Tobacco Use: Never     Passive Exposure: Not on file   Financial Resource Strain: Medium Risk (3/7/2022)    Received from [facility] [employer] [facility], [facility]    Financial Resource Strain     Difficulty of Paying Living Expenses: 1     Difficulty of Paying Living Expenses: 2   Alcohol Use: Not on file   Transportation Needs: No Transportation Needs (3/7/2022)    Received from [facility] [employer] [facility], [facility]    Transportation Needs     Lack of Transportation (Medical): 1   Physical Activity: Not on file   Interpersonal Safety: Not on file   Stress: Not on file   Social Connections: Unknown (3/9/2023)    [facility], [facility]    Social Connections     Frequency of Communication with Friends and Family: Not on file   Health Literacy: Not on file       Functional Status:  Prior to admission patient needed assistance:   Dependent ADLs:: Bathing, Dressing, Grooming, Incontinence, Transfers, Wheelchair-with assist, Toileting  Dependent IADLs:: Cleaning, Cooking, Laundry, Shopping, Meal Preparation, Medication Management, Transportation       Mental Health Status:  Mental Health  Status: No Current Concerns       Chemical Dependency Status:  Chemical Dependency Status: No Current Concerns             Values/Beliefs:  Spiritual, Cultural Beliefs, Temple Practices, Values that affect care: no               Additional Information:    Spoke with Daughter, Lu as the Patient was asleep for discharge planning due to assisted living resident and high risk for readmission.    The Patient lives at [facility] (Phone: [phone] Fax:[phone] RN report: [phone] or [phone]).  Lu reports Patient receives assistance with bathing, dressing, transfers with a lift from wheelchair and medication management.  She has left [facility] and is wheelchair bound.   Spoke with Meghana GARCIA, Critical access hospital regarding services.  She is currently open with [facility] (Phone: [phone] Fax: [phone]) RN for wound care, PT and OT.  Referral placed to resume home care.    The Patient was hospitalized 6/6-6/10/24 at [facility] for right lower extremity cellulitis and discharged to [facility] TCU.  She discharged from TCU 7/8.    Care Management will arrange transportation upon discharge.    Plan:  Return to [facility] with resumption of [facility]      Eloisa Morales RN

## 2024-07-11 NOTE — H&P
HOSPITALIST TELEMED ADMISSION H&P    Service Date : 2024  Dr. Amy CARDENAS am located in [place]    Linda Loredo is located in [place] at [facility]  RN Pat  on Med/Surg is assisting me today with this patient.    chief complaint:   altered mental status    History of Present Illness:  Linda Loredo is a 79 year old female  with type 2 diabetes, chronic right-sided heart failure, hypertension, paroxysmal atrial fibrillation,  on Coumadin, hyperlipidemia, CKD, hypothyroidism, left lower extremity amputation secondary to osteomyelitis, presenting to ED today  with  Altered mental status.  The patient was hospitalized in  for sepsis secondary to cellulitis,   She was discharged to a TCU, and then  to a Nursing home and was prescribed oral cephalexin for some skin changes that were noted on the right lower extremity.  At the NH they noted that she was confused and was difficult to arouse.     Emergency Room Course - in the emergency room, serologies for CMP, CBC, lactic acid, procalcitonin, INR, blood cultures x 2     Radiological diagnostics included:      None    Past Medical History  Past Medical History:   Diagnosis Date    Depressive disorder, not elsewhere classified     Herpes zoster without mention of complication     Hypercalcemia 2007    Mild intermittent asthma     Mixed hyperlipidemia due to type 2 diabetes mellitus (H) 2008    Formatting of this note is different from the original.     22 ASCVD 10 year risk: 37.9%      Last Lipids:  Last Lipids:  Chol: 2021 130   63  HDL: 2021 46  Non-HDL: 2021 84  Chol/HDL Ratio: 2021 2.83  LDL DIRECT: . No results found in past 5 years   LDL CHOLESTEROL (mg/dL)   Date Value   2021 71      22   The 10-year ASCVD risk score (Alford LUIS Jr.    Other urinary incontinence     Type II or unspecified type diabetes mellitus with renal manifestations,  uncontrolled(250.42) (H)     Type II or unspecified type diabetes mellitus without mention of complication, not stated as uncontrolled     Unspecified essential hypertension       Patient Active Problem List   Diagnosis    Type 2 diabetes mellitus with complication, with long-term current use of insulin (H)    Essential hypertension with goal blood pressure less than 140/90    combination of stress and urge URINARY INCONTINENCE    Herpes zoster    Insomnia    Mixed hyperlipidemia     OBESITY    Diabetic polyneuropathy associated with type 2 diabetes mellitus (H)    Routine general medical examination at a health care facility    Generalized osteoarthrosis, unspecified site    Hypersomnia with sleep apnea    Fibromyalgia    Anxiety    Chronic kidney disease    Elevated liver enzymes    Fracture of fibula, distal, left, closed    Hypotension    Abscess of left foot    Allergic rhinitis    Anticoagulation monitoring, INR range 2-3    Lower extremity edema    COPD (chronic obstructive pulmonary disease) (H)    Major depressive disorder, recurrent episode, moderate (H)    Microalbuminuria due to type 2 diabetes mellitus (H)    Paroxysmal atrial fibrillation (H)    Osteopenia    Obesity, Class III, BMI 40-49.9 (morbid obesity) (H)    Vitamin D deficiency    Umbilical hernia without obstruction and without gangrene    Systolic murmur    Primary hyperparathyroidism (H24)    Chronic pain    Cellulitis of buttock    Sepsis without acute organ dysfunction, due to unspecified organism (H)    UTI (urinary tract infection) - possible    GUSTAVO (acute kidney injury) (H24)    Dehydration    Cellulitis - bilateral lower extremities    Decubitus ulcers - 5 present on buttocks/perineal region, numerous scabbed over and unstagable on shin and bilateral feet with some bloody blisters noted on feet    Warfarin-induced coagulopathy (H24)    Hypoglycemia    Supratherapeutic INR    Opioid dependence, uncomplicated (H)    Weakness    History of  atrial fibrillation    Non-healing wound of left heel    Unable to care for self    Diabetic foot ulcer (H)    Osteomyelitis (H)    Chronic right-sided heart failure (H)    Skin ulcer of right lower leg with fat layer exposed (H)    Cellulitis of right lower extremity    Chronic kidney disease, stage 3b (H)    Adverse effect of anticoagulants, initial encounter    Cardiac murmur, unspecified    Hx of osteomyelitis    Localized edema    Lymphedema, not elsewhere classified    Muscle weakness (generalized)    Major depressive disorder, recurrent, in partial remission (H24)    Nausea    Drug induced constipation    Constipation    Other specified disorders of bone density and structure, unspecified site    Other injury of unspecified body region, subsequent encounter    Other abnormalities of gait and mobility    Venous stasis    Unspecified open wound, left foot, subsequent encounter    Personal history of other diseases of the circulatory system    Other chronic pain    Acute kidney injury (H24)    Sepsis, due to unspecified organism, unspecified whether acute organ dysfunction present (H)         Past Surgical History  Past Surgical History:   Procedure Laterality Date    AMPUTATE LEG BELOW KNEE Left 10/7/2023    Procedure: LEFT GUILLOTINE BELOW KNEE AMPUTATION.;  Surgeon: Lan Otto MD;  Location:  OR    AMPUTATE LEG BELOW KNEE Left 10/14/2023    Procedure: debridement of left below the knee amputation;  Surgeon: Lan Otto MD;  Location:  OR    APPLY WOUND VAC Left 1/2/2024    Procedure: WOUND VAC APPLICATION TO LEFT BELOW KNEE STUMP;  Surgeon: Lan Otto MD;  Location:  OR    CHOLECYSTECTOMY      GRAFT SKIN SPLIT THICKNESS FROM TRUNK TO HEAD Left 1/2/2024    Procedure: APPLICATION OF SPLIT-THICKNESS SKIN GRAFT TO LEFT BELOW KNEE STUMP, GRAFT FROM LEFT THIGH;  Surgeon: Lan Otto MD;  Location:  OR    INCISION AND DRAINAGE FOOT, COMBINED Left 9/26/2023     Procedure: Surgical debridement of all nonviable skin and soft tissue and bone left foot;  Surgeon: Nolberto Thorne DPM;  Location: [facility] OR    IR LOWER EXTREMITY ANGIOGRAM LEFT  10/3/2023    ligation of fallopian tube      OPEN REDUCTION INTERNAL FIXATION ANKLE  10/13/11    left    pinning of left 2nd toe        Social History  Linda  reports that she has never smoked. She has never used smokeless tobacco. She reports that she does not drink alcohol and does not use drugs.    Family History  Linda's family history includes Cancer in her maternal grandmother and paternal grandmother; Diabetes in her sister; Heart Disease in her father; Hypertension in her mother and sister; Psychotic Disorder in her sister; Respiratory in her sister.    Home Medications  Current Outpatient Medications   Medication Instructions    acetaminophen (TYLENOL) 500-1,000 mg, Oral, EVERY 8 HOURS PRN    atorvastatin (LIPITOR) 40 mg, Oral, EVERY EVENING    insulin degludec (TRESIBA) 34 Units, Subcutaneous, EVERY MORNING, Hold for blood glucose less than 100    melatonin 1 mg, Oral, AT BEDTIME PRN    methadone (DOLOPHINE) 5 mg, Oral, 4 TIMES DAILY, 0700, 1100, 1500, 2000    metolazone (ZAROXOLYN) 2.5 mg, Oral, WEEKLY, Give on thursdays    miconazole (MICATIN) 2 % external powder Topical, 2 TIMES DAILY    mineral oil-hydrophilic petrolatum (AQUAPHOR) external ointment Apply to left thigh wound BID    multivitamin (CENTRUM SILVER) tablet 1 tablet, Oral, DAILY    ondansetron (ZOFRAN ODT) 4 mg, Oral, EVERY 6 HOURS PRN    polyethylene glycol (MIRALAX) 17 g, Oral, 2 TIMES DAILY PRN    potassium chloride ER (K-TAB) 20 MEQ CR tablet 20 mEq, Oral, DAILY    senna-docusate (SENOKOT-S/PERICOLACE) 8.6-50 MG tablet 1 tablet, Oral, 2 TIMES DAILY PRN    torsemide (DEMADEX) 60 mg, Oral, DAILY, HOLD if SBP<100    WARFARIN SODIUM PO Oral, 7/8 INR >8. Patient refused lab draw. Hold warfarin x 3 days and recheck INR.        Allergies  Allergies   Allergen  Reactions    Prednisone Nausea and Vomiting    Morphine Nausea and Vomiting    Morphine [Fumaric Acid] Nausea and Vomiting    Nitrofurantoin Unknown     Per nursing home        10 pt ROS neg except as noted in HPI    Physical Exam:  I performed all aspects of the physical examination via Telemedicine associated with two way audio and video communication.    Vital Signs: Blood pressure 124/56, pulse 84, temperature 97.5  F (36.4  C), temperature source Oral, resp. rate 18, SpO2 99%.    Physical Exam    Gen:  Well-developed, well-nourished, in no acute distress, lying semi-supine in her hospital bed  HEENT: NC/AT, EOMI,  hearing intact to voice  Resp:  No accessory muscle use, breath sounds clear; no wheezes no rales no rhonchi  Card:  (+)murmur, normal S1, S2   Abd:  Soft per RN exam, no TTP, non-distended, normoactive bowel sounds are present  Ext: Left BKA with chronic wound on the stump and erythema,  right lower extremity  noted with open wounds or oozing, and an erythematous leg  Skin:  as noted above, buttocks with a sacral decubitus, erythema is noted around the lower abdomen  Psych: Not anxious, not agitated    DATA:    I&O: No intake/output data recorded.     Clinically Significant Risk Factors Present on Admission              Labs:  I have personally reviewed the following studies:  Recent Labs   Lab 07/11/24  0006   POTASSIUM 3.6   CHLORIDE 88*   CO2 31*   BUN 84.1*   ALBUMIN 2.9*   ALKPHOS 101   AST 56*   ALT 21   INR 2.96*      Recent Labs   Lab 07/11/24  0006   WBC 11.1*   HGB 10.3*   HCT 31.1*            Imaging: ER imaging reviewed    Misc  DVT prophylaxis: Coumadin  Code Status: Full code   Cardiac Monitoring:   Braga:  none  Lines:  peripheral IV  Diet:  diabetic    ASSESSMENT/PLAN:   79-year-old female with confusion,  failed outpatient treatment for cellulitis of the right leg, will be admitted for IV antibiotics.     This patient's current admission to an acute care setting is essential  for the treatment of sepsis and cellulitis.      The patient's recovery is dependent on the following treatments of  IV antibiotics and correcting any electrolyte imbalances to stabilize this condition.     The patient is at risk of developing further complications of  end organ failure if not treated in an acute care setting. I expect the patient's hospital stay to require  2 - 4 days      Our patient will be admitted under the hospitalist services and further management to be based on patient's clinical progress.         # ACTIVE PROBLEM LISTS:    # Hypertension: Stable  continue with monitoring,  patient is currently not taking any medications for hypertension    #Chronic Right Sided Heart failure:  hold Zaroxolyn,  and torsemide, consider IV Lasix given the GUSTAVO    # Acute Kidney Injury, unspecified: based on a >150% or 0.3 mg/dL increase in last creatinine compared to past 90 day average, will monitor renal function          # Hyperlipidemia:   Cholesterol   Date Value Ref Range Status   09/30/2023 82 <200 mg/dL Final   06/08/2007 123 0 - 200 mg/dL Final     Comment:     LDL Cholesterol is the primary guide to therapy: LDL-cholesterol goal in high   risk patients is <100 mg/dL and in very high risk patients is <70 mg/dL.   The NCEP recommends further evaluation of: patients with cholesterol <200 mg/dL   if additional risk factors are present, cholesterol >240 mg/dL, triglycerides   >150 mg/dL, or HDL <40 mg/dL.    Stable, Continue medication management with  atorvastatin    # Hypercalcemia/ primary hyperparathyroidism: corrected calcium is >10.1, will monitor as appropriate      # Hypoalbuminemia: Lowest albumin = 2.9 g/dL at 7/11/2024 12:06 AM, will monitor as appropriate     #/ Type II Diabetes:     Hemoglobin A1C   Date Value Ref Range Status   03/01/2024 9.9 (H) <5.7 % Final     Comment:     Normal <5.7%   Prediabetes 5.7-6.4%    Diabetes 6.5% or higher     Note: Adopted from ADA consensus guidelines.  "  12/19/2007 10.0 (H) 4.3 - 6.0 % Final        Monitor blood glucose levels daily, Continue  with Tresiba    # Fibromyalgia:  continue with methadone    # Anemia: based on hgb <11,  will monitor    # Obesity: Estimated body mass index is 35.22 kg/m  as calculated from the following:    Height as of 7/8/24: 1.676 m (5' 6\").    Weight as of 7/8/24: 99 kg (218 lb 3.2 oz).       Our patient will be admitted under the hospitalist services and further management to be based on patient's clinical progress.      As the provider for the telehealth service, I attest that I introduced myself to the patient and provided my credentials. Based on review of the patient s chart and/or a discussion with members of the patient s treatment team, I determined that telemedicine via a real-time, two-way, interactive audio and video platform is an appropriate means of providing this service. The patient and I mutually agreed that this visit is appropriate for telemedicine as well.    The encounter was approximately 10 minutes. The nurse was present for the duration of the encounter.    Chart reviewed,labs and available imaging reviewed,consultant notes reviewed. Previous available medical records have been reviewed  Care plan discussed with care team    I have seen and examined the patient today. Documentation for this visit was completed using a template. Everything documented was personally performed today with the necessary additions, deletions and changes made as appropriate with the diagnoses being listed in no particular order of clinical relevance.    Amy Robles MD, FACP  Securely message with the Vocera Web Console (learn more here)  Text page via [email or web address] Paging/Directory   "

## 2024-07-11 NOTE — PHARMACY-ANTICOAGULATION SERVICE
Clinical Pharmacy - Warfarin Dosing Consult     Pharmacy has been consulted to manage this patient s warfarin therapy.  Indication: Atrial Fibrillation  INR Goal: 2-3  Warfarin Prior to Admission: Yes  PTA regimen: 3 mg once daily ([facility]  - Prescriber: Lor Braun DNP)       INR   Date Value Ref Range Status   07/11/2024 2.96 (H) 0.85 - 1.15 Final   06/10/2024 4.83 (H) 0.85 - 1.15 Final       Recommend warfarin 3 mg today.  Pharmacy will monitor Linda Loredo daily and order warfarin doses to achieve specified goal.      Please contact pharmacy as soon as possible if the warfarin needs to be held for a procedure or if the warfarin goals change.       Thank you,  Primitivo Carmen, PharmD  Clinical Pharmacist - System [facility]

## 2024-07-11 NOTE — PROGRESS NOTES
"[facility] ADMISSION NOTE    Patient admitted to room 2301 at approximately 0228 via cart from emergency room. Patient was accompanied by transport tech.     Verbal SBAR report received from Nyasia BOLTON RN prior to patient arrival.     Patient trasferred to bed via slip sheet & 4 caregivers. Patient alert and oriented X 4. Pain is controlled with current analgesics.  Medication(s) being used: methadone 5mg given in ED.  . Admission vital signs: Blood pressure 100/49, pulse 94, temperature 97.6  F (36.4  C), temperature source Oral, resp. rate 18, height 1.676 m (5' 6\"), weight 99.6 kg (219 lb 9.3 oz), SpO2 98%. Patient was oriented to plan of care, call light, bed controls, tv, telephone, bathroom, and visiting hours.     Risk Assessment    The following safety risks were identified during admission: skin. Yellow risk band applied: YES.     Skin Initial Assessment    This writer admitted this patient and completed a full skin assessment and Suresh score in the Adult PCS flowsheet.   Photo documentation of skin problem and/or wound competed via [employer] application (located under Media):  No    Appropriate interventions initiated as needed.     Secondary skin check completed by Suzanne LANGSTON RN.    Suresh Risk Assessment  Sensory Perception: 3-->slightly limited  Moisture: 2-->very moist  Activity: 1-->bedfast  Mobility: 3-->slightly limited  Nutrition: 3-->adequate  Friction and Shear: 2-->potential problem  Suresh Score: 14  Moisture Interventions: Urinary collection device, [place] textile in skin fold (InterDry)  Friction/Shear Interventions: Other (comment) (vaseline gauze applied to buttock & inner thigh wounds)    Education    Patient has a [place] to Observation order: No  Observation education completed and documented: N/A      Giuliana Gillis RN      "

## 2024-07-11 NOTE — PROGRESS NOTES
"WOC consult noted, pt will be seen Friday 7/12/24 for in person assessment. Initiating temporary care orders today based on photos reviewed in EPIC and this writer's familiarity with pt from prior episodes of care.    Buttock wound(s):  1.) Cleanse with microklenz, dry  2.) Cover with Mepilex foam bandages sized appropriately to fit area  Change every other day and as needed    Pressure Injury Prevention (PIP) Plan:  If patient is declining pressure injury prevention interventions: Explore reason why and address patient's concerns, Educate on pressure injury risk and prevention intervention(s), If patient is still declining, document \"informed refusal\" , and Ensure Care team is aware ( provider, charge nurse, etc)  Mattress: Follow bed algorithm, reassess daily and order specialty mattress, if indicated.  HOB: Maintain at or below 30 degrees, unless contraindicated  Repositioning in bed: Every 1-2 hours , Left/right positioning; avoid supine, and Raise foot of bed prior to raising head of bed, to reduce patient sliding down (shear)  Heels: Keep elevated off mattress  Chair positioning: Chair cushion (#[id number]) , Repositions self: patient to shift weight every 15 minutes, and Assist patient to reposition hourly   If patient has a buttock pressure injury, or high risk for PI use chair cushion or SPS.  Moisture Management: Perineal cleansing /protection: Follow Incontinence Protocol  Under Devices: Inspect skin under all medical devices during skin inspection , Ensure tubes are stabilized without tension, and Ensure patient is not lying on medical devices or equipment when repositioned  Ask provider to discontinue device when no longer needed.        Right leg wound(s): Daily  1.) Clean intact skin on legs and feet with Wander wipes.   2.) Clean wounds with Vashe soaked gauze on wounds for 5-10 minutes. Then gently wipe wounds with gauze and pat dry.   3.) Apply Aquaphor to any dry, flaky areas  4.) Apply triad paste to " adaptic then lay over over wounds for debridement   5.) Cover with ABD pads and secure with rolled gauze.    Elevate leg on 2 pillows in bed    Lizabeth Botello RN, CWOCN

## 2024-07-11 NOTE — MEDICATION SCRIBE - ADMISSION MEDICATION HISTORY
Medication Scribe Admission Medication History    Admission medication history is complete. The information provided in this note is only as accurate as the sources available at the time of the update.    Information Source(s): Facility ([facility]/NH/) medication list/MAR via  MAR from [facility]    Pertinent Information:     Changes made to PTA medication list:  Added: tylenol 500, q8, prn, bio freeze, tab-a-bakari, zinc oxide  Deleted: centrum silver vit  Changed: tylenol 500, 1-2, q8hr for pain 6-10/10 to 2 tabs q8hr for pain 6-10/10    Allergies reviewed with patient and updates made in EHR: yes    Medication History Completed By: Shelbie Steiner 7/11/2024 10:21 AM    PTA Med List   Medication Sig Note Last Dose    acetaminophen (TYLENOL) 500 MG tablet Take 1 tablet by mouth every 8 hours as needed for mild pain Pain 1-5/10  Unknown at unknown    acetaminophen (TYLENOL) 500 MG tablet Take 1-2 tablets (500-1,000 mg) by mouth every 8 hours as needed for mild pain (Patient taking differently: Take 2 tablets by mouth every 8 hours as needed for mild pain Pain 6-10/10)  Unknown at unknown    atorvastatin (LIPITOR) 40 MG tablet Take 1 tablet (40 mg) by mouth every evening  7/10/2024 at 2000    cephALEXin (KEFLEX) 500 MG capsule TAKE 1 CAP BY MOUTH THREE TIMES DAILY FOR CELLULITIS FOR 10 DAYS STOP 7/13/24  7/10/2024 at 2000    insulin degludec (TRESIBA) 200 UNIT/ML pen Inject 34 Units Subcutaneous every morning Hold for blood glucose less than 100  7/10/2024 at 0700    melatonin 1 MG TABS tablet Take 1 tablet (1 mg) by mouth nightly as needed for sleep  Unknown at unknown    Menthol, Topical Analgesic, (BIOFREEZE EX) Apply to neck and shoulders  Unknown at unknown    methadone (DOLOPHINE) 5 MG tablet Take 1 tablet (5 mg) by mouth 4 times daily 0700, 1100, 1500, 2000  7/10/2024 at 2000    metolazone (ZAROXOLYN) 2.5 MG tablet Take 1 tablet (2.5 mg) by mouth once a week Give on thursdays 7/11/2024: DUE TODAY  Unknown at unknown    miconazole (MICATIN) 2 % external powder Apply topically 2 times daily  7/10/2024 at hs    Multiple Vitamin (TAB-A-YUN) TABS Take 1 tablet by mouth daily at 2 pm  7/10/2024 at 0700    ondansetron (ZOFRAN ODT) 4 MG ODT tab Take 1 tablet (4 mg) by mouth every 6 hours as needed for nausea or vomiting  Unknown at unknown    polyethylene glycol (MIRALAX) 17 GM/Dose powder Take 17 g by mouth 2 times daily as needed for constipation  Unknown    potassium chloride ER (K-TAB) 20 MEQ CR tablet Take 20 mEq by mouth daily  7/10/2024 at 0700    senna-docusate (SENOKOT-S/PERICOLACE) 8.6-50 MG tablet Take 1 tablet by mouth 2 times daily as needed for constipation  Unknown at unknown    torsemide (DEMADEX) 20 MG tablet Take 3 tablets (60 mg) by mouth daily HOLD if SBP<100  7/10/2024 at 700    WARFARIN SODIUM PO 7/8 INR >8. Patient refused lab draw. Hold warfarin x 3 days and recheck INR.      Zinc oxide 10 % CREA Externally apply topically 3 times daily Apply to coccyx area once per shift after brief changes  7/10/2024 at hs

## 2024-07-11 NOTE — PLAN OF CARE
"  Problem: Adult Inpatient Plan of Care  Goal: Plan of Care Review  Description: The Plan of Care Review/Shift note should be completed every shift.  The Outcome Evaluation is a brief statement about your assessment that the patient is improving, declining, or no change.  This information will be displayed automatically on your shift  note.  7/11/2024 1542 by Madyson Roman RN  Outcome: Not Progressing  7/11/2024 1541 by Madyson Roman RN  Outcome: Not Progressing  Flowsheets (Taken 7/11/2024 1541)  Plan of Care Reviewed With:   patient   child  Overall Patient Progress: no change  7/11/2024 1137 by Madyson Roman RN  Outcome: Progressing  Flowsheets (Taken 7/11/2024 1137)  Plan of Care Reviewed With:   patient   child  Overall Patient Progress: no change  Goal: Patient-Specific Goal (Individualized)  Description: You can add care plan individualizations to a care plan. Examples of Individualization might be:  \"Parent requests to be called daily at 9am for status\", \"I have a hard time hearing out of my right ear\", or \"Do not touch me to wake me up as it startles  me\".  7/11/2024 1542 by Madyson Roman RN  Outcome: Not Progressing  7/11/2024 1541 by Madyson Roman RN  Outcome: Not Progressing  7/11/2024 1137 by Madyson Roman RN  Outcome: Progressing  Goal: Absence of Hospital-Acquired Illness or Injury  7/11/2024 1542 by Madyson Roman RN  Outcome: Not Progressing  7/11/2024 1541 by Madyson Roman RN  Outcome: Not Progressing  7/11/2024 1137 by Madyson Roman RN  Outcome: Progressing  Intervention: Identify and Manage Fall Risk  Recent Flowsheet Documentation  Taken 7/11/2024 1000 by Madyson Roman RN  Safety Promotion/Fall Prevention:   assistive device/personal items within reach   clutter free environment maintained   lighting adjusted  Intervention: Prevent Skin Injury  Recent Flowsheet Documentation  Taken 7/11/2024 1000 by Madyson Roman RN  Body Position: supine  Intervention: Prevent " "Infection  Recent Flowsheet Documentation  Taken 7/11/2024 1000 by Madyson Roman RN  Infection Prevention: hand hygiene promoted  Goal: Optimal Comfort and Wellbeing  7/11/2024 1542 by Madyson Roman RN  Outcome: Not Progressing  7/11/2024 1541 by Madyson Roman RN  Outcome: Not Progressing  7/11/2024 1137 by Madyson Roman RN  Outcome: Progressing  Goal: Readiness for Transition of Care  7/11/2024 1542 by Madyson Roman RN  Outcome: Not Progressing  7/11/2024 1541 by Madyson Roman RN  Outcome: Not Progressing  7/11/2024 1137 by Madyson Roman RN  Outcome: Progressing   Goal Outcome Evaluation:      Plan of Care Reviewed With: patient, child    Overall Patient Progress: no changeOverall Patient Progress: no change    Patient had visitors/phone calls most of the morning. Patient had a bath with 2 staff this afternoon and to allow for nurse to visualize and photograph her buttocks wounds. Staff trying to be as gentle as possible with her. She screamed during entire bath. Refusing staff to do anything with her right leg wounds. Vaseline dressings need to be changed on her legs, but her behavior impedes doing her cares. Does not want to move or turn or allow staff to do treatments with her. Asked her which side she would like to turn onto and she responded by saying,\"Don't be so condescending.\" Refuses to have the head of her bed put down for turning and cares, but there really is no other way to do adequate cares, skin assessments and repositioning. Daughter says she sleeps upright all the time. She is refusing to turn. Brief removed as it was stuck to her buttocks. Buttock wounds bleeding. Applied barrier cream and that is all she would allow.WOC will be seeing her tomorrow. Suggest they block off a fair amount of time to see her due to resistive behaviors.            "

## 2024-07-11 NOTE — PROGRESS NOTES
[facility]    Medicine Progress Note - Hospitalist Service    Date of Admission:  7/10/2024    Assessment & Plan    Lindapancho Loredo is a 79 year old female  with type 2 diabetes, chronic right-sided heart failure, atrial fibrillation on Coumadin, hyperlipidemia, CKD, left lower extremity amputation secondary to osteomyelitis, presenting to ED today with  Altered mental status.  Hospitalized 6/6-10/24 for sepsis secondary to cellulitis of right lower extremity with 1/1 blood cultures positive for pan-sensitive S. dysgalactiae. Repeat BCX 6/7 were negative. She also had a UTI and urine culture grew providencia rettgeri - resistant to zosyn/unasyn,  susceptible to rocephin. She was treated with zosyn and vanco, changed to ceftriaxone and discharged to a TCU with ceftriaxone for 2 weeks. She was then prescribed oral cephalexin 7/3 for some skin changes that were noted on the right lower extremity.  She discharged to Nursing home 7/8. At the NH they noted that she was confused and was difficult to arouse.     Multiple admissions last 2 years     Encephalopathy  Presume due to sepsis, right lower extremity cellulitis    - Check VBG  - Check UA/UC  - Hold methadone     Right lower extremity cellulitis  Recently started on keflex 7/3 without improvement   On admission WBC 11, afebrile, lactate 1.4   Started on Zosyn in ED    CRP 6/19/24 193  Procalcitonin 0.25    - Check CRP  - Blood cultures pending     Wounds  Venous stasis   Coccyx, right lower extremity   - Wound Care RN consult     Chronic kidney disease 3  Acute Kidney Injury, unspecified   Baseline creatinine 1.0- 1.1 (though 1.6 when last checked). Admission Creatinine 2.00  Weight stable, down from discharge in 6/2024  - Hold diuretics  - UA  - Follow, consider Intravenous fluids     Atrial fibrillation, likely permanent   New onset during 5/2022 admission ...  Last EKG 1/2024 - afib - flutter. EKG 9/2023 atrial fibrillation    EKG 7/10/2024 - fib - flutter, rate controlled  - Telemetry   - Pharmacy to dose warfarin    Anemia, normocytic, likely anemia of chronic disease   Admission HGB 10.3. Baseline mostly 10-10.5  No evidence acute blood loss  - Follow   - Check B12, iron studies     Type 2 Diabetes:   Last A1c 9.9 3/2024  Monitor blood glucose levels daily  Low BS this AM 7/11/2024   - Hold Tresiba 34    'Chronic Right Sided Heart failure'   Pulmonary hypertension by echo and mild to moderate pulmonary hypertension by angiogram 2006  Chronic lower extremities edema  Venous stasis   Echo 5/2022 [facility] - Left Ventricle Normal left ventricular chamber size. Normal left ventricular systolic function. Calculated left ventricular ejection  fraction (modified Pan technique) is 69 %. No regional wall motion abnormalities. Mildly increased left    ventricular wall thickness. Indeterminate left ventricular diastolic function. Right ventricle was not well visualized. Estimated right ventricular systolic pressure is 34.3 mmHg plus right atrial pressure. Estimated right ventricular systolic pressure is 47.6 mmHg.    Normal right ventricular systolic function. Mild mitral valve regurgitation. Mild tricuspid valve regurgitation.   - Hold Zaroxolyn 2.5, and torsemide 20 given the GUSTAVO, KCL 20    Obstructive sleep apnea  Sleep study 2004 reportedly showing severe OBSTRUCTIVE SLEEP APNEA and recommend CPAP, Not available for review. Patient is untreated      Hypercalcemia  Primary hyperparathyroidism per problem list  Work up January 2013 Dr. Villareal, not subsequently addressed 2014  On admission corrected calcium is >10.1, will monitor as appropriate    - Check ionized calcium     S/p left below-knee amputation 107/23    Fibromyalgia  Opioid dependence  - continue with methadone 5     History of hypertension  Patient is currently not taking any medications for hypertension    Hyperlipidemia  - Continue atorvastatin 40     Obesity  Complicates  "cares and contributes to morbidity    Hypoalbuminemia  Albumin = 2.9 g/dL at 7/11/2024, will monitor as appropriate           Diet: Combination Diet Moderate Consistent Carb (60 g CHO per Meal) Diet; Low Saturated Fat Na <2400mg Diet, No Caffeine Diet    DVT Prophylaxis: Warfarin  Braga Catheter: Not present  Lines: PRESENT             Cardiac Monitoring: None  Code Status: Full Code      Clinically Significant Risk Factors Present on Admission           # Hypercalcemia: corrected calcium is >10.1, will monitor as appropriate    # Hypoalbuminemia: Lowest albumin = 2.9 g/dL at 7/11/2024 12:06 AM, will monitor as appropriate  # Drug Induced Coagulation Defect: home medication list includes an anticoagulant medication   # Acute Kidney Injury, unspecified: based on a >150% or 0.3 mg/dL increase in last creatinine compared to past 90 day average, will monitor renal function  # Hypertension: Noted on problem list    # Anemia: based on hgb <11          # DMII: A1C = N/A within past 6 months    # Obesity: Estimated body mass index is 35.44 kg/m  as calculated from the following:    Height as of this encounter: 1.676 m (5' 6\").    Weight as of this encounter: 99.6 kg (219 lb 9.3 oz).       # Financial/Environmental Concerns:           Disposition Plan     Medically Ready for Discharge: Anticipated in 2-4 Days             Samy Tim MD  Hospitalist Service  [facility]  Securely message with [employer] (more info)  Text page via [employer] Paging/Directory   ______________________________________________________________________    Interval History   No events    Physical Exam   Vital Signs: Temp: 97.9  F (36.6  C) Temp src: Axillary BP: 101/45 Pulse: 85   Resp: 18 SpO2: 91 % O2 Device: None (Room air)    Weight: 219 lbs 9.25 oz    Constitutional: somnolent, rouses to voice answers yes/no    Medical Decision Making       >60 MINUTES SPENT BY ME on the date of service doing chart review, history, exam, " documentation & further activities per the note.      Data     CMP  Recent Labs   Lab 07/11/24  0844 07/11/24  0810 07/11/24 0006   NA  --   --  132*   POTASSIUM  --   --  3.6   CHLORIDE  --   --  88*   CO2  --   --  31*   ANIONGAP  --   --  13   * 49* 77   BUN  --   --  84.1*   CR  --   --  2.00*   GFRESTIMATED  --   --  25*   ZAKIA  --   --  9.9   PROTTOTAL  --   --  8.3   ALBUMIN  --   --  2.9*   BILITOTAL  --   --  0.7   ALKPHOS  --   --  101   AST  --   --  56*   ALT  --   --  21     CBC  Recent Labs   Lab 07/11/24 0006   WBC 11.1*   RBC 3.44*   HGB 10.3*   HCT 31.1*   MCV 90   MCH 29.9   MCHC 33.1   RDW 15.4*        INR  Recent Labs   Lab 07/11/24 0006   INR 2.96*       Lab Results   Component Value Date    TSH 3.46 11/24/2023    TSH 2.36 10/15/2007

## 2024-07-11 NOTE — ED TRIAGE NOTES
Just discharged from TCU yesterday to [facility]. Staff found her tonight and were unable to arouse her. Said her temp was 95, she was confused and unable to get O2 reading. She's had decrease in appetite, and increase in confusion since Monday.

## 2024-07-11 NOTE — ED NOTES
[facility]   Admission Handoff    The patient is Linda Loredo, 79 year old who arrived in the ED by AMBULANCE from [facility] with a complaint of Altered Mental Status  . The patient's current symptoms are a recurrence of a past episode and during this time the symptoms have decreased. In the ED, patient was diagnosed with   Final diagnoses:   Cellulitis of right lower extremity   Sepsis, due to unspecified organism, unspecified whether acute organ dysfunction present (H)   Venous stasis   Acute kidney injury (H24)   Diabetic polyneuropathy associated with type 2 diabetes mellitus (H)   Anticoagulation monitoring, INR range 2-3   Paroxysmal atrial fibrillation (H)   Other chronic pain         Needed?: No    Allergies:    Allergies   Allergen Reactions    Prednisone Nausea and Vomiting    Morphine Nausea and Vomiting    Morphine [Fumaric Acid] Nausea and Vomiting    Nitrofurantoin Unknown     Per nursing home       Past Medical Hx:   Past Medical History:   Diagnosis Date    Depressive disorder, not elsewhere classified     Herpes zoster without mention of complication     Hypercalcemia 2007    Mild intermittent asthma     Mixed hyperlipidemia due to type 2 diabetes mellitus (H) 2008    Formatting of this note is different from the original.     22 ASCVD 10 year risk: 37.9%      Last Lipids:  Last Lipids:  Chol: 2021 130   63  HDL: 2021 46  Non-HDL: 2021 84  Chol/HDL Ratio: 2021 2.83  LDL DIRECT: . No results found in past 5 years   LDL CHOLESTEROL (mg/dL)   Date Value   2021 71      22   The 10-year ASCVD risk score (Teddyjaney SMITH Jr.    Other urinary incontinence     Type II or unspecified type diabetes mellitus with renal manifestations, uncontrolled(250.42) (H)     Type II or unspecified type diabetes mellitus without mention of complication, not stated as uncontrolled     Unspecified essential  hypertension        Initial vitals were: BP: 110/60  Pulse: 101  Temp: 97.5  F (36.4  C)  Resp: 18  SpO2: 100 %   Recent vital Signs: /61   Pulse 85   Temp 97.5  F (36.4  C) (Oral)   Resp 18   SpO2 100%     Elimination Status: Continent: purewick     Activity Level: 2 assist    Fall Status: Reason for falls risk:  Mobility and Reason for falls risk: Elimination  bed/chair alarm on, nonskid shoes/slippers when out of bed, and arm band in place    Baseline Mental status: WDL  Current Mental Status changes: at basesline    Infection present or suspected this encounter: cultures pending  Sepsis suspected: Yes    Isolation type:     Bariatric equipment needed?: No    In the ED these meds were given:   Medications   sodium chloride (PF) 0.9% PF flush 3 mL (has no administration in time range)   sodium chloride (PF) 0.9% PF flush 3 mL ( Intracatheter Canceled Entry 7/11/24 0011)   piperacillin-tazobactam (ZOSYN) 2.25 g vial to attach to  ml bag (has no administration in time range)   sodium chloride 0.9% BOLUS 1,000 mL (1,000 mLs Intravenous $New Bag 7/11/24 0009)   methadone (DOLOPHINE) tablet 5 mg (5 mg Oral $Given 7/11/24 0104)       Drips running?  No    Home pump  No    Current LDAs: Peripheral IV: Site R AC; Gauge 20  none     Results:   Labs/Imaging  Ordered and Resulted from Time of ED Arrival Up to the Time of Departure from the ED  Results for orders placed or performed during the hospital encounter of 07/10/24 (from the past 24 hour(s))   CBC with platelets differential    Narrative    The following orders were created for panel order CBC with platelets differential.  Procedure                               Abnormality         Status                     ---------                               -----------         ------                     CBC with platelets and d...[[id number]]  Abnormal            Final result                 Please view results for these tests on the individual orders.    Comprehensive metabolic panel   Result Value Ref Range    Sodium 132 (L) 135 - 145 mmol/L    Potassium 3.6 3.4 - 5.3 mmol/L    Carbon Dioxide (CO2) 31 (H) 22 - 29 mmol/L    Anion Gap 13 7 - 15 mmol/L    Urea Nitrogen 84.1 (H) 8.0 - 23.0 mg/dL    Creatinine 2.00 (H) 0.51 - 0.95 mg/dL    GFR Estimate 25 (L) >60 mL/min/1.73m2    Calcium 9.9 8.8 - 10.2 mg/dL    Chloride 88 (L) 98 - 107 mmol/L    Glucose 77 70 - 99 mg/dL    Alkaline Phosphatase 101 40 - 150 U/L    AST 56 (H) 0 - 45 U/L    ALT 21 0 - 50 U/L    Protein Total 8.3 6.4 - 8.3 g/dL    Albumin 2.9 (L) 3.5 - 5.2 g/dL    Bilirubin Total 0.7 <=1.2 mg/dL   Lactic Acid Whole Blood with 1X Repeat in 2 HR when >2   Result Value Ref Range    Lactic Acid, Initial 1.4 0.7 - 2.0 mmol/L   Procalcitonin   Result Value Ref Range    Procalcitonin 0.25 <0.50 ng/mL   INR   Result Value Ref Range    INR 2.96 (H) 0.85 - 1.15   CBC with platelets and differential   Result Value Ref Range    WBC Count 11.1 (H) 4.0 - 11.0 10e3/uL    RBC Count 3.44 (L) 3.80 - 5.20 10e6/uL    Hemoglobin 10.3 (L) 11.7 - 15.7 g/dL    Hematocrit 31.1 (L) 35.0 - 47.0 %    MCV 90 78 - 100 fL    MCH 29.9 26.5 - 33.0 pg    MCHC 33.1 31.5 - 36.5 g/dL    RDW 15.4 (H) 10.0 - 15.0 %    Platelet Count 289 150 - 450 10e3/uL    % Neutrophils 71 %    % Lymphocytes 18 %    % Monocytes 9 %    % Eosinophils 2 %    % Basophils 0 %    % Immature Granulocytes 0 %    Absolute Neutrophils 7.9 1.6 - 8.3 10e3/uL    Absolute Lymphocytes 2.0 0.8 - 5.3 10e3/uL    Absolute Monocytes 1.0 0.0 - 1.3 10e3/uL    Absolute Eosinophils 0.2 0.0 - 0.7 10e3/uL    Absolute Basophils 0.0 0.0 - 0.2 10e3/uL    Absolute Immature Granulocytes 0.0 <=0.4 10e3/uL       For the majority of the shift this patient's behavior was Green     Cardiac Rhythm: Normal Sinus  Pt needs tele? No  Skin/wound Issues:  Leg wound and coccy pressure ulcer,see LDA    Code Status: Full Code    Pain control: good    Nausea control: good    Abnormal labs/tests/findings  requiring intervention:     Patient tested for COVID 19 prior to admission: NO     OBS brochure/video discussed/provided to patient/family: N/A     Family present during ED course? Yes     Family Comments/Social Situation comments:     Tasks needing completion: None    Nyasia Gillis RN

## 2024-07-11 NOTE — PLAN OF CARE
Problem: Skin or Soft Tissue Infection  Goal: Absence of Infection Signs and Symptoms  Outcome: Progressing   Goal Outcome Evaluation: RLE slightly swollen, angry red, warm to touch. Multiple open areas covered w/ vaseline gauze weeping small amount seroganguenous fluid. Pt afebrile. Receiving IV antibiotics.

## 2024-07-11 NOTE — PROGRESS NOTES
Patient voided around 400ml in external catheter canister. Bladder scan for post void residual 146 ml.

## 2024-07-11 NOTE — PLAN OF CARE
"  Problem: Adult Inpatient Plan of Care  Goal: Plan of Care Review  Description: The Plan of Care Review/Shift note should be completed every shift.  The Outcome Evaluation is a brief statement about your assessment that the patient is improving, declining, or no change.  This information will be displayed automatically on your shift  note.  Outcome: Progressing  Flowsheets (Taken 7/11/2024 1137)  Plan of Care Reviewed With:   patient   child  Overall Patient Progress: no change  Goal: Patient-Specific Goal (Individualized)  Description: You can add care plan individualizations to a care plan. Examples of Individualization might be:  \"Parent requests to be called daily at 9am for status\", \"I have a hard time hearing out of my right ear\", or \"Do not touch me to wake me up as it startles  me\".  Outcome: Progressing  Goal: Absence of Hospital-Acquired Illness or Injury  Outcome: Progressing  Intervention: Identify and Manage Fall Risk  Recent Flowsheet Documentation  Taken 7/11/2024 1000 by Madyson Roman RN  Safety Promotion/Fall Prevention:   assistive device/personal items within reach   clutter free environment maintained   lighting adjusted  Intervention: Prevent Skin Injury  Recent Flowsheet Documentation  Taken 7/11/2024 1000 by Madyson Roman RN  Body Position: supine  Intervention: Prevent Infection  Recent Flowsheet Documentation  Taken 7/11/2024 1000 by Madyson Roman RN  Infection Prevention: hand hygiene promoted  Goal: Optimal Comfort and Wellbeing  Outcome: Progressing  Goal: Readiness for Transition of Care  Outcome: Progressing   Goal Outcome Evaluation:      Plan of Care Reviewed With: patient, child    Overall Patient Progress: no changeOverall Patient Progress: no change    Patient was lethargic and difficult to arouse this am. Skin moist. BG-49. Dextrose given IV and aroused after that, but dozes off easily. Disoriented when daughter came to talk to her, but after talking to her daughter she is " now alert. Dr. Tim did talk to daughter and update her on plan of care. Vitals are stable.

## 2024-07-12 ENCOUNTER — APPOINTMENT (OUTPATIENT)
Dept: ULTRASOUND IMAGING | Facility: CLINIC | Age: 79
DRG: 853 | End: 2024-07-12
Attending: INTERNAL MEDICINE
Payer: COMMERCIAL

## 2024-07-12 ENCOUNTER — APPOINTMENT (OUTPATIENT)
Dept: GENERAL RADIOLOGY | Facility: CLINIC | Age: 79
DRG: 853 | End: 2024-07-12
Attending: INTERNAL MEDICINE
Payer: COMMERCIAL

## 2024-07-12 PROBLEM — Z87.39 HX OF OSTEOMYELITIS: Chronic | Status: ACTIVE | Noted: 2024-04-16

## 2024-07-12 PROBLEM — L97.919 ULCER OF RIGHT LOWER EXTREMITY (H): Status: ACTIVE | Noted: 2024-07-12

## 2024-07-12 LAB
ANION GAP SERPL CALCULATED.3IONS-SCNC: 9 MMOL/L (ref 7–15)
BACTERIA UR CULT: NORMAL
BUN SERPL-MCNC: 51.7 MG/DL (ref 8–23)
CA-I BLD-MCNC: 5 MG/DL (ref 4.4–5.2)
CALCIUM SERPL-MCNC: 9.2 MG/DL (ref 8.8–10.2)
CHLORIDE SERPL-SCNC: 96 MMOL/L (ref 98–107)
CREAT SERPL-MCNC: 1.45 MG/DL (ref 0.51–0.95)
CRP SERPL-MCNC: 62.98 MG/L
DEPRECATED HCO3 PLAS-SCNC: 30 MMOL/L (ref 22–29)
EGFRCR SERPLBLD CKD-EPI 2021: 37 ML/MIN/1.73M2
ERYTHROCYTE [DISTWIDTH] IN BLOOD BY AUTOMATED COUNT: 15.8 % (ref 10–15)
GLUCOSE SERPL-MCNC: 131 MG/DL (ref 70–99)
HCT VFR BLD AUTO: 27.2 % (ref 35–47)
HGB BLD-MCNC: 9.1 G/DL (ref 11.7–15.7)
INR PPP: 4.21 (ref 0.85–1.15)
MAGNESIUM SERPL-MCNC: 2.2 MG/DL (ref 1.7–2.3)
MCH RBC QN AUTO: 30.2 PG (ref 26.5–33)
MCHC RBC AUTO-ENTMCNC: 33.5 G/DL (ref 31.5–36.5)
MCV RBC AUTO: 90 FL (ref 78–100)
PLATELET # BLD AUTO: 205 10E3/UL (ref 150–450)
POTASSIUM SERPL-SCNC: 3.2 MMOL/L (ref 3.4–5.3)
RBC # BLD AUTO: 3.01 10E6/UL (ref 3.8–5.2)
SODIUM SERPL-SCNC: 135 MMOL/L (ref 135–145)
WBC # BLD AUTO: 10.6 10E3/UL (ref 4–11)

## 2024-07-12 PROCEDURE — 83735 ASSAY OF MAGNESIUM: CPT | Performed by: INTERNAL MEDICINE

## 2024-07-12 PROCEDURE — 73620 X-RAY EXAM OF FOOT: CPT | Mod: RT

## 2024-07-12 PROCEDURE — 85610 PROTHROMBIN TIME: CPT | Performed by: INTERNAL MEDICINE

## 2024-07-12 PROCEDURE — 250N000013 HC RX MED GY IP 250 OP 250 PS 637: Performed by: INTERNAL MEDICINE

## 2024-07-12 PROCEDURE — 86140 C-REACTIVE PROTEIN: CPT | Performed by: INTERNAL MEDICINE

## 2024-07-12 PROCEDURE — G0463 HOSPITAL OUTPT CLINIC VISIT: HCPCS

## 2024-07-12 PROCEDURE — 80048 BASIC METABOLIC PNL TOTAL CA: CPT | Performed by: INTERNAL MEDICINE

## 2024-07-12 PROCEDURE — 99232 SBSQ HOSP IP/OBS MODERATE 35: CPT | Performed by: INTERNAL MEDICINE

## 2024-07-12 PROCEDURE — 82330 ASSAY OF CALCIUM: CPT | Performed by: INTERNAL MEDICINE

## 2024-07-12 PROCEDURE — 85027 COMPLETE CBC AUTOMATED: CPT | Performed by: INTERNAL MEDICINE

## 2024-07-12 PROCEDURE — 36415 COLL VENOUS BLD VENIPUNCTURE: CPT | Performed by: INTERNAL MEDICINE

## 2024-07-12 PROCEDURE — 250N000011 HC RX IP 250 OP 636: Performed by: EMERGENCY MEDICINE

## 2024-07-12 PROCEDURE — 120N000001 HC R&B MED SURG/OB

## 2024-07-12 PROCEDURE — 93922 UPR/L XTREMITY ART 2 LEVELS: CPT | Mod: 52

## 2024-07-12 RX ORDER — NALOXONE HYDROCHLORIDE 0.4 MG/ML
0.2 INJECTION, SOLUTION INTRAMUSCULAR; INTRAVENOUS; SUBCUTANEOUS
Status: DISCONTINUED | OUTPATIENT
Start: 2024-07-12 | End: 2024-07-16 | Stop reason: HOSPADM

## 2024-07-12 RX ORDER — NALOXONE HYDROCHLORIDE 0.4 MG/ML
0.4 INJECTION, SOLUTION INTRAMUSCULAR; INTRAVENOUS; SUBCUTANEOUS
Status: DISCONTINUED | OUTPATIENT
Start: 2024-07-12 | End: 2024-07-16 | Stop reason: HOSPADM

## 2024-07-12 RX ORDER — OXYCODONE HYDROCHLORIDE 5 MG/1
5 TABLET ORAL EVERY 6 HOURS PRN
Status: DISCONTINUED | OUTPATIENT
Start: 2024-07-12 | End: 2024-07-12

## 2024-07-12 RX ADMIN — PIPERACILLIN AND TAZOBACTAM 2.25 G: 2; .25 INJECTION, POWDER, FOR SOLUTION INTRAVENOUS at 19:40

## 2024-07-12 RX ADMIN — PIPERACILLIN AND TAZOBACTAM 2.25 G: 2; .25 INJECTION, POWDER, FOR SOLUTION INTRAVENOUS at 13:13

## 2024-07-12 RX ADMIN — METHADONE HYDROCHLORIDE 5 MG: 5 TABLET ORAL at 10:28

## 2024-07-12 RX ADMIN — METHADONE HYDROCHLORIDE 5 MG: 5 TABLET ORAL at 17:43

## 2024-07-12 RX ADMIN — PIPERACILLIN AND TAZOBACTAM 2.25 G: 2; .25 INJECTION, POWDER, FOR SOLUTION INTRAVENOUS at 08:21

## 2024-07-12 RX ADMIN — MICONAZOLE NITRATE: 20 POWDER TOPICAL at 19:40

## 2024-07-12 RX ADMIN — MICONAZOLE NITRATE: 20 POWDER TOPICAL at 08:22

## 2024-07-12 RX ADMIN — THERA TABS 1 TABLET: TAB at 08:22

## 2024-07-12 RX ADMIN — ATORVASTATIN CALCIUM 40 MG: 20 TABLET, FILM COATED ORAL at 19:40

## 2024-07-12 RX ADMIN — PIPERACILLIN AND TAZOBACTAM 2.25 G: 2; .25 INJECTION, POWDER, FOR SOLUTION INTRAVENOUS at 00:05

## 2024-07-12 RX ADMIN — POTASSIUM CHLORIDE 20 MEQ: 1500 TABLET, EXTENDED RELEASE ORAL at 08:22

## 2024-07-12 ASSESSMENT — ACTIVITIES OF DAILY LIVING (ADL)
ADLS_ACUITY_SCORE: 45
ADLS_ACUITY_SCORE: 44
ADLS_ACUITY_SCORE: 45
ADLS_ACUITY_SCORE: 42
ADLS_ACUITY_SCORE: 45
ADLS_ACUITY_SCORE: 44
ADLS_ACUITY_SCORE: 48
ADLS_ACUITY_SCORE: 44
ADLS_ACUITY_SCORE: 42
ADLS_ACUITY_SCORE: 45
ADLS_ACUITY_SCORE: 48
ADLS_ACUITY_SCORE: 41
ADLS_ACUITY_SCORE: 48
ADLS_ACUITY_SCORE: 44
ADLS_ACUITY_SCORE: 45
ADLS_ACUITY_SCORE: 44

## 2024-07-12 NOTE — PLAN OF CARE
"Goal Outcome Evaluation:      Plan of Care Reviewed With: patient    Overall Patient Progress: no changeOverall Patient Progress: no change    Outcome Evaluation: Pt A&Ox4. Able to make needs known. C/O pain during cares which resolves with rest and scheduled analgesics. Agrees to micro adjustments. O2 dips into 80s while asleep, immediately returning to mid/high 90s without supplementation. Snoring noted.  As night progressed, O2 sats required 1 L O2 supplementation to maintain above 90.    Visit Vitals  /49 (BP Location: Left arm)   Pulse 86   Temp 97.9  F (36.6  C) (Oral)   Resp 16       Problem: Adult Inpatient Plan of Care  Goal: Plan of Care Review  Description: The Plan of Care Review/Shift note should be completed every shift.  The Outcome Evaluation is a brief statement about your assessment that the patient is improving, declining, or no change.  This information will be displayed automatically on your shift  note.  Outcome: Not Progressing  Flowsheets (Taken 7/12/2024 0252)  Outcome Evaluation: Pt A&Ox4. Able to make needs known. C/O pain during cares which resolves with rest and scheduled analgesics. Agrees to micro adjustments. O2 dips into 80s while asleep, immediately returning to mid/high 90s without supplementation. Snoring noted.  Plan of Care Reviewed With: patient  Overall Patient Progress: no change  Goal: Patient-Specific Goal (Individualized)  Description: You can add care plan individualizations to a care plan. Examples of Individualization might be:  \"Parent requests to be called daily at 9am for status\", \"I have a hard time hearing out of my right ear\", or \"Do not touch me to wake me up as it startles  me\".  Outcome: Not Progressing  Goal: Absence of Hospital-Acquired Illness or Injury  Outcome: Not Progressing  Intervention: Identify and Manage Fall Risk  Recent Flowsheet Documentation  Taken 7/12/2024 0000 by Sophie Brower RN  Safety Promotion/Fall Prevention:   activity " supervised   assistive device/personal items within reach   nonskid shoes/slippers when out of bed   room door open   room near nurse's station   safety round/check completed   supervised activity   lighting adjusted   clutter free environment maintained   increase visualization of patient  Intervention: Prevent Skin Injury  Recent Flowsheet Documentation  Taken 7/12/2024 0000 by Sophie Brower RN  Body Position:   turned   left   log-rolled  Intervention: Prevent and Manage VTE (Venous Thromboembolism) Risk  Recent Flowsheet Documentation  Taken 7/12/2024 0000 by Sophie Brower RN  VTE Prevention/Management: (anticoagulation therapy maintained)   SCDs off (sequential compression devices)   other (see comments)  Intervention: Prevent Infection  Recent Flowsheet Documentation  Taken 7/12/2024 0000 by Sophie Brower RN  Infection Prevention:   hand hygiene promoted   rest/sleep promoted   environmental surveillance performed  Goal: Optimal Comfort and Wellbeing  Outcome: Not Progressing  Intervention: Monitor Pain and Promote Comfort  Recent Flowsheet Documentation  Taken 7/12/2024 0000 by Sophie Brower RN  Pain Management Interventions:   repositioned   pillow support provided   rest   emotional support   diversional activity provided   care clustered   medication offered but refused  Goal: Readiness for Transition of Care  Outcome: Not Progressing

## 2024-07-12 NOTE — PROGRESS NOTES
[facility]    Medicine Progress Note - Hospitalist Service    Date of Admission:  7/10/2024    Assessment & Plan   Lindapancho Loredo is a 79 year old female  with type 2 diabetes, chronic right-sided heart failure, atrial fibrillation on Coumadin, hyperlipidemia, CKD, left lower extremity amputation secondary to osteomyelitis, presenting to ED today with  Altered mental status.  Hospitalized 6/6-10/24 for sepsis secondary to cellulitis of right lower extremity with 1/1 blood cultures positive for pan-sensitive S. dysgalactiae. Repeat BCX 6/7 were negative. She also had a UTI and urine culture grew providencia rettgeri - resistant to zosyn/unasyn,  susceptible to rocephin. She was treated with zosyn and vanco, changed to ceftriaxone and discharged to a TCU with ceftriaxone for 2 weeks. She was then prescribed oral cephalexin 7/3 for some skin changes that were noted on the right lower extremity.  She discharged to Nursing home 7/8. At the NH they noted that she was confused and was difficult to arouse.     Multiple admissions last 2 years     Encephalopathy  Presume due to infection, right lower extremity cellulitis vs osteomyelitis vs urinary tract infection    7/11/2024     Resolved, rapid improvement after admission    Right lower extremity cellulitis, possible osteomyelitis  Recently started on keflex 7/3 without improvement   On admission WBC 11, afebrile, lactate 1.4   Started on Zosyn in ED    CRP 6/19/24 193  Procalcitonin 0.25  Xray negative for osteomyelitis    Afebrile, WBC 10  - MRI right foot   - Check CRP  - Blood cultures pending     Urinary tract infection  UA 7/11/2024:  >182 wbc. 24 rbc, large LE    On zosyn  - UC pending     Wounds  Venous stasis   Coccyx, right lower extremity (large area on calf suspect pressure/venous stasis, 2 medium sized areas right lateral mid-foot)  Right distal lateral mid-foot ulcer worrisome for osteomyelitis   Seen by WOC JOSE  7/12/2024   - Check right lower extremity arterial duplex    Chronic kidney disease 3  Acute Kidney Injury, unspecified   Baseline creatinine 1.0- 1.1 (though 1.6 when last checked). Admission BUN/creatinine 84/2.0  Weight stable, down from discharge in 6/2024    BUN/creatinine improved 51/1.4  - Hold diuretics  - Follow, consider Intravenous fluids       Atrial fibrillation, likely permanent   New onset during 5/2022 admission ...  Last EKG 1/2024 - afib - flutter. EKG 9/2023 atrial fibrillation   EKG 7/10/2024 - fib - flutter, rate controlled    Telemetry rate controlled atrial fibrillation/atrial flutter   INR elevated  - Pharmacy to dose warfarin  - Discontinue telemetry     Anemia, normocytic, likely anemia of chronic disease/chronic kidney disease   Admission HGB 10.3. Baseline mostly 10-10.5  No evidence acute blood loss  B12 1349. Ferritin 189. %Fe sat 30    HGB 9.1   - Follow     Type 2 Diabetes:   Last A1c 9.9 3/2024  Monitor blood glucose levels daily    BS are good  - Hold Tresiba 34    'Chronic Right Sided Heart failure'   Pulmonary hypertension by echo and mild to moderate pulmonary hypertension by angiogram 2006  Chronic lower extremities edema  Venous stasis   Echo 5/2022 [facility] - Left Ventricle Normal left ventricular chamber size. Normal left ventricular systolic function. Calculated left ventricular ejection  fraction (modified Pan technique) is 69 %. No regional wall motion abnormalities. Mildly increased left    ventricular wall thickness. Indeterminate left ventricular diastolic function. Right ventricle was not well visualized. Estimated right ventricular systolic pressure is 34.3 mmHg plus right atrial pressure. Estimated right ventricular systolic pressure is 47.6 mmHg.    Normal right ventricular systolic function. Mild mitral valve regurgitation. Mild tricuspid valve regurgitation.   - Hold Zaroxolyn 2.5, and torsemide 20 given the GUSTAVO, KCL 20    Hypokalemia  Replace per protocol      Obstructive sleep apnea  Sleep study 2004 reportedly showed severe obstructive sleep apnea and recommend CPAP, Not available for review. Patient is untreated      Hypercalcemia   Primary hyperparathyroidism per problem list  Work up January 2013 juan Hernández. Hypercalcemia with elevated PTH. Not subsequently addressed 2014  On admission corrected calcium is >10.1, will monitor as appropriate    - ionized calcium normal 5.0    S/p left below-knee amputation 10/2023    Fibromyalgia  Opioid dependence  - Continue with methadone 5     History of hypertension  Patient is currently not taking any medications for hypertension    Hyperlipidemia  - Continue atorvastatin 40     Obesity  Complicates cares and contributes to morbidity    Hypoalbuminemia  Albumin = 2.9 g/dL at 7/11/2024, will monitor as appropriate           Diet: Combination Diet Moderate Consistent Carb (60 g CHO per Meal) Diet; Low Saturated Fat Na <2400mg Diet, No Caffeine Diet    DVT Prophylaxis: Warfarin  Braga Catheter: Not present  Lines: None     Cardiac Monitoring: None  Code Status: Full Code      Clinically Significant Risk Factors              Disposition Plan     Medically Ready for Discharge: Anticipated in 2-4 Days             Samy Tim MD  Hospitalist Service  [facility]  Securely message with [employer] (more info)  Text page via [employer] Paging/Directory   ______________________________________________________________________    Interval History   As above    Physical Exam   Vital Signs: Temp: 98.1  F (36.7  C) Temp src: Oral BP: 127/54 Pulse: 78   Resp: 18 SpO2: 95 % O2 Device: None (Room air)    Weight: 219 lbs 9.25 oz    Constitutional: awake, alert, cooperative, no apparent distress, and appears stated age    Medical Decision Making       45 MINUTES SPENT BY ME on the date of service doing chart review, history, exam, documentation & further activities per the note.      Data     Recent Labs   Lab  07/12/24 0709 07/11/24 1129 07/11/24  1021 07/11/24  0844 07/11/24  0810 07/11/24  0006   * 102* 111* 152* 49* 77     CMP  Recent Labs   Lab 07/12/24 0709 07/11/24 1129 07/11/24  1021 07/11/24  0844 07/11/24  0810 07/11/24  0006     --   --   --   --  132*   POTASSIUM 3.2*  --   --   --   --  3.6   CHLORIDE 96*  --   --   --   --  88*   CO2 30*  --   --   --   --  31*   ANIONGAP 9  --   --   --   --  13   * 102* 111* 152*   < > 77   BUN 51.7*  --   --   --   --  84.1*   CR 1.45*  --   --   --   --  2.00*   GFRESTIMATED 37*  --   --   --   --  25*   ZAKIA 9.2  --   --   --   --  9.9   MAG 2.2  --   --   --   --   --    PROTTOTAL  --   --   --   --   --  8.3   ALBUMIN  --   --   --   --   --  2.9*   BILITOTAL  --   --   --   --   --  0.7   ALKPHOS  --   --   --   --   --  101   AST  --   --   --   --   --  56*   ALT  --   --   --   --   --  21    < > = values in this interval not displayed.     CBC  Recent Labs   Lab 07/12/24 0709 07/11/24 0006   WBC 10.6 11.1*   RBC 3.01* 3.44*   HGB 9.1* 10.3*   HCT 27.2* 31.1*   MCV 90 90   MCH 30.2 29.9   MCHC 33.5 33.1   RDW 15.8* 15.4*    289     INR  Recent Labs   Lab 07/12/24 0709 07/11/24 0006   INR 4.21* 2.96*       Venous Blood Gas  Recent Labs   Lab 07/11/24 0915   PHV 7.46*   PCO2V 54*   PO2V 28   HCO3V 38*   GUILLERMINA 12.2*   O2PER 21      UA RESULTS:  Recent Labs   Lab Test 07/11/24  1238   COLOR Yellow   APPEARANCE Cloudy*   URINEGLC Negative   URINEBILI Negative   URINEKETONE Negative   SG 1.013   UBLD Moderate*   URINEPH 6.0   PROTEIN Negative   NITRITE Negative   LEUKEST Large*   RBCU 24*   WBCU >182*

## 2024-07-12 NOTE — PLAN OF CARE
Goal Outcome Evaluation:      Plan of Care Reviewed With: patient    Overall Patient Progress: no changeOverall Patient Progress: no change    Assumed care of patient [id number]. Alert and oriented. Bedrest overnight. Patient has been pleasant and cooperative, but resists being moved. Complaints of pain in right leg which subsided after repositioning and receiving tylenol. Purewick in place.

## 2024-07-12 NOTE — PROGRESS NOTES
Care Management Follow Up    Length of Stay (days): 1    Expected Discharge Date: 07/12/2024     Concerns to be Addressed: discharge planning     Patient plan of care discussed at interdisciplinary rounds: Yes    Anticipated Discharge Disposition: Assisted Living, Home Care     Anticipated Discharge Services: Transportation Services  Anticipated Discharge DME: None    Patient/family educated on Medicare website which has current facility and service quality ratings: no  Education Provided on the Discharge Plan: Yes  Patient/Family in Agreement with the Plan: yes    Referrals Placed by CM/SW: External Care Coordination, Homecare, Transportation  Private pay costs discussed: Not applicable    Additional Information:    Spoke with JOSE Kowalski at [facility] (Phone: [phone] Fax:[phone] RN report: [phone] or [phone]) regarding potential discharge today/tomorrow.     Per Meghana- staff has concerns about their ability to care for patient at the [facility] and that she needs to be reviewed by their Director of Nursing (Marcela) on Monday prior to returning.     PLAN: Return to [facility] on Monday    BEN STODDARD RN

## 2024-07-12 NOTE — CONSULTS
[facility]  WO Nurse Inpatient Assessment     Consulted for: Buttocks, RLE, LLE stump     Summary: Pt known to this [facility] from prior hospitalizations. Pt has multiple wounds on her sacrum/coccyx, right foot, and an extensive area of necrosis on her right calf. Recommended ABIs with toe pressures to determine blood flow for healing as she's not had ABIs or other studies done since angiogram in 10/2023.   Pt in extreme pain during consult r/t not being on Methadone inpatient. This was resumed after cares were completed.    Patient History (according to provider note(s):      History of Present Illness:  Linda Loredo is a 79 year old female  with type 2 diabetes, chronic right-sided heart failure, hypertension, paroxysmal atrial fibrillation,  on Coumadin, hyperlipidemia, CKD, hypothyroidism, left lower extremity amputation secondary to osteomyelitis, presenting to ED today  with  Altered mental status.  The patient was hospitalized in June for sepsis secondary to cellulitis,   She was discharged to a TCU, and then  to a Nursing home and was prescribed oral cephalexin for some skin changes that were noted on the right lower extremity.  At the NH they noted that she was confused and was difficult to arouse.     Assessment:      Areas visualized during today's visit: Focused:sacrum/coccyx, posterior legs, right lower leg, left stump, right foot    Pressure Injury Location: coccyx/sacrum          Last photo: 7/12/24  Wound type: Pressure Injury     Pressure Injury Stage: 2, present on admission  Wound history/plan of care:   unknown    Wound base: 100 clean pink/red %     Palpation of the wound bed: normal      Drainage: moderate     Description of drainage: serosanguinous     Measurements (length x width x depth, in cm) 5cm x 9.5cm x 0.2cm     Tunneling N/A     Undermining N/A  Periwound skin: Dry/scaly      Color:  tan      Temperature: normal   Odor: none  Pain:  extreme - pt  hasn't received Methadone since admission  Pain intervention prior to dressing change: slow and gentle cares  and distraction  Treatment goal: Infection control/prevention, Protection, and Promote epidermal migration  STATUS: initial assessment  Supplies ordered: supplies stored on unit, discussed with RN, and discussed with patient     Pressure Injury Location: Right posterior thigh      Last photo: 7/12/24  Wound type: Pressure Injury     Pressure Injury Stage: 3, present on admission      This is a Medical Device Related Pressure Injury (MDRPI) due to  n/a  Wound history/plan of care:   unknown    Wound base: 100% slough     Palpation of the wound bed: normal      Drainage: small     Description of drainage: serosanguinous     Measurements (length x width x depth, in cm) 1.2cm x 1.5cm x 0.5cm     Tunneling N/A     Undermining N/A  Periwound skin: Intact      Color:  one dusky area visible in above photo      Temperature: normal   Odor: none  Pain: extreme - pt hasn't received Methadone since admission  Pain intervention prior to dressing change: slow and gentle cares  and distraction  Treatment goal: Infection control/prevention, Per patient preference, and Soften necrotic tissue  STATUS: initial assessment  Supplies ordered: supplies stored on unit, discussed with RN, and discussed with patient     Wound location: Right anterior leg    Last photo: 7/12/24  Wound due to: Venous Ulcer  Wound history/plan of care: pt previously followed in [place] but hasn't followed up since February  Wound base: 100% clean red     Palpation of the wound bed: normal      Drainage: moderate     Description of drainage: serosanguinous     Measurements (length x width x depth, in cm): 11cm x 13cm x 0.1cm     Tunneling: N/A     Undermining: N/A  Periwound skin: Edematous and venous stasis      Color: pink      Temperature: normal   Odor: none  Pain: extreme - pt hasn't received Methadone since admission  Pain intervention prior to  dressing change: slow and gentle cares  and distraction  Treatment goal: Infection control/prevention, Protection, and Promote epidermal migration  STATUS: initial assessment  Supplies ordered: supplies stored on unit, discussed with RN, and discussed with patient      Pressure Injury Location: Right calf      Last photo: 7/12/24  Wound type: Pressure Injury     Pressure Injury Stage: Unstageable, present on admission   Wound history/plan of care:   unknown    Wound base: >95% brown, <5% yellow     Palpation of the wound bed: firm      Drainage: small     Description of drainage: yellow     Measurements (length x width x depth, in cm) 14cm x 20cm x 0.1cm      Tunneling N/A     Undermining N/A  Periwound skin: Superficial erosion      Color: pink      Temperature: normal   Odor: moderate  Pain: extreme - pt hasn't received Methadone since admission  Pain intervention prior to dressing change: slow and gentle cares  and distraction  Treatment goal: Protection  STATUS: initial assessment  Supplies ordered: supplies stored on unit, discussed with RN, and discussed with patient     Pressure Injury Location: right foot      Last photo: 7/12/24  Wound type: Pressure Injury     Pressure Injury Stage: Unstageable, present on admission   Wound history/plan of care:   unknown    Wound base: not open - lateral foot is brown eschar; heels are closed blood blisters;     Palpation of the wound bed: normal      Drainage: none     Description of drainage: none     Measurements (length x width x depth, in cm)   1cm x 1.5cm x 0.1cm  1.5cm x 1.5cm - closed lateral/posterior foot  1cm x 4.5cm - closed (heel)  0.3cm x 0.2cm area of unblanchable redness on 5th toe     Tunneling N/A     Undermining N/A  Periwound skin: Intact and peeling skin      Color: normal and consistent with surrounding tissue      Temperature: normal   Odor: none  Pain: extreme - pt hasn't received Methadone since admission  Pain intervention prior to dressing  "change: slow and gentle cares  and distraction  Treatment goal: Protection  STATUS: initial assessment  Supplies ordered: open to air; heel boot for protection    My PI Risk Assessment     Sensory Perception: 2 - Very Limited     Moisture: 3 - Occasionally moist      Activity: 2 - Chairfast     Mobility: 2 - Very limited     Nutrition: 2 - Probably inadequate      Friction/Shear: 1 - Problem     TOTAL: 12       3 areas of eschar with a stitch showing. These areas are stable. Photo 7/12/24    Treatment Plan:     Sacral/Coccyx/Right Thigh wounds:  1.) Cleanse with Vashe on gauze soak for 5 minutes. Pat dry.  2.) Cover with Mepilex foam bandages sized appropriately to fit area  Change every other day and as needed.    Pressure Injury Prevention (PIP) Plan:  If patient is declining pressure injury prevention interventions: Explore reason why and address patient's concerns, Educate on pressure injury risk and prevention intervention(s), If patient is still declining, document \"informed refusal\" , and Ensure Care team is aware ( provider, charge nurse, etc)  Mattress: Follow bed algorithm, reassess daily and order specialty mattress, if indicated.  HOB: Maintain at or below 30 degrees, unless contraindicated  Repositioning in bed: Every 1-2 hours , Left/right positioning; avoid supine, and Raise foot of bed prior to raising head of bed, to reduce patient sliding down (shear)  Heels: Keep elevated off mattress; heel boot to right  Chair positioning: Chair cushion (#[id number]) and Assist patient to reposition hourly   If patient has a buttock pressure injury, or high risk for PI use chair cushion or SPS.  Moisture Management: Perineal cleansing /protection: Follow Incontinence Protocol  Under Devices: Inspect skin under all medical devices during skin inspection , Ensure tubes are stabilized without tension, and Ensure patient is not lying on medical devices or equipment when repositioned  Ask provider to discontinue device " when no longer needed.         Right leg wound(s): Daily  1.) Vashe soaked gauze to right foot and lower leg for 5 minutes. Then gently wipe wounds with gauze and pat dry.   3.) Apply Aquaphor to any dry, flaky areas  4.) Adaptic to open wounds on anterior leg.  5.) Cover all wounds with ABD pads and secure with rolled gauze.     Heel boot to right with leg elevated on pillows.    Orders: Updated    RECOMMEND PRIMARY TEAM ORDER: Surgical consult and see below  Education provided: importance of repositioning, plan of care, Moisture management, Hygiene, and Off-loading pressure  Discussed plan of care with: Patient, Nurse, and Physician - requested ABIs with toe pressures, assessment of foot wounds (Dr. Tim ordered xray and following that order MRI)  [facility] nurse follow-up plan: weekly  Notify [facility] if wound(s) deteriorate.  Nursing to notify the Provider(s) and re-consult the [facility] Nurse if new skin concern.    DATA:     Current support surface: Standard  Low air loss (BRISA pump, Isolibrium, Pulsate)  Containment of urine/stool: Incontinence Protocol, Incontinent pad in bed, and Suction based external urinary catheter   BMI: Body mass index is 35.44 kg/m .   Active diet order: Orders Placed This Encounter      Combination Diet Moderate Consistent Carb (60 g CHO per Meal) Diet; Low Saturated Fat Na <2400mg Diet, No Caffeine Diet     Output: I/O last 3 completed shifts:  In: -   Out: 1350 [Urine:1350]     Labs:   Recent Labs   Lab 07/12/24  0709 07/11/24  0006   ALBUMIN  --  2.9*   HGB 9.1* 10.3*   INR 4.21* 2.96*   WBC 10.6 11.1*     Pressure injury risk assessment:   Sensory Perception: 3-->slightly limited  Moisture: 3-->occasionally moist  Activity: 2-->chairfast  Mobility: 2-->very limited  Nutrition: 3-->adequate  Friction and Shear: 2-->potential problem  Suresh Score: 15    Anastasia Gray RN, CWOCN   Pager no longer is use, please contact through [employer] group: [facility] Nurse   Dept. Office Number: [phone]

## 2024-07-12 NOTE — PHARMACY-ANTICOAGULATION SERVICE
Clinical Pharmacy - Warfarin Dosing Consult     Pharmacy has been consulted to manage this patient s warfarin therapy.  Indication: Atrial Fibrillation  Therapy Goal: INR 2-3  OP Anticoag Clinic: [facility]  Warfarin Prior to Admission: Yes  Warfarin PTA Regimen: 3 mg daily  Significant drug interactions: N/A  Recent documented change in oral intake/nutrition: Unknown  Dose Comments: Significant rise from 7/11 to 7/12, will hold dose today    INR   Date Value Ref Range Status   07/12/2024 4.21 (H) 0.85 - 1.15 Final   07/11/2024 2.96 (H) 0.85 - 1.15 Final       Recommend warfarin 0 mg today.  Pharmacy will monitor Linda Loredo daily and order warfarin doses to achieve specified goal.      Please contact pharmacy as soon as possible if the warfarin needs to be held for a procedure or if the warfarin goals change.

## 2024-07-13 ENCOUNTER — ANESTHESIA EVENT (OUTPATIENT)
Dept: SURGERY | Facility: CLINIC | Age: 79
DRG: 853 | End: 2024-07-13
Payer: COMMERCIAL

## 2024-07-13 ENCOUNTER — APPOINTMENT (OUTPATIENT)
Dept: MRI IMAGING | Facility: CLINIC | Age: 79
DRG: 853 | End: 2024-07-13
Attending: INTERNAL MEDICINE
Payer: COMMERCIAL

## 2024-07-13 ENCOUNTER — APPOINTMENT (OUTPATIENT)
Dept: ULTRASOUND IMAGING | Facility: CLINIC | Age: 79
DRG: 853 | End: 2024-07-13
Attending: INTERNAL MEDICINE
Payer: COMMERCIAL

## 2024-07-13 LAB
ANION GAP SERPL CALCULATED.3IONS-SCNC: 8 MMOL/L (ref 7–15)
BUN SERPL-MCNC: 33.7 MG/DL (ref 8–23)
CALCIUM SERPL-MCNC: 9 MG/DL (ref 8.8–10.2)
CHLORIDE SERPL-SCNC: 99 MMOL/L (ref 98–107)
CREAT SERPL-MCNC: 1.14 MG/DL (ref 0.51–0.95)
DEPRECATED HCO3 PLAS-SCNC: 32 MMOL/L (ref 22–29)
EGFRCR SERPLBLD CKD-EPI 2021: 49 ML/MIN/1.73M2
ERYTHROCYTE [DISTWIDTH] IN BLOOD BY AUTOMATED COUNT: 16.1 % (ref 10–15)
GLUCOSE BLDC GLUCOMTR-MCNC: 173 MG/DL (ref 70–99)
GLUCOSE BLDC GLUCOMTR-MCNC: 226 MG/DL (ref 70–99)
GLUCOSE BLDC GLUCOMTR-MCNC: 241 MG/DL (ref 70–99)
GLUCOSE BLDC GLUCOMTR-MCNC: 251 MG/DL (ref 70–99)
GLUCOSE SERPL-MCNC: 157 MG/DL (ref 70–99)
HCT VFR BLD AUTO: 27.7 % (ref 35–47)
HGB BLD-MCNC: 9 G/DL (ref 11.7–15.7)
INR PPP: 1.76 (ref 0.85–1.15)
INR PPP: 3.3 (ref 0.85–1.15)
INR PPP: 3.51 (ref 0.85–1.15)
MCH RBC QN AUTO: 30.3 PG (ref 26.5–33)
MCHC RBC AUTO-ENTMCNC: 32.5 G/DL (ref 31.5–36.5)
MCV RBC AUTO: 93 FL (ref 78–100)
PLATELET # BLD AUTO: 205 10E3/UL (ref 150–450)
POTASSIUM SERPL-SCNC: 2.9 MMOL/L (ref 3.4–5.3)
POTASSIUM SERPL-SCNC: 3.9 MMOL/L (ref 3.4–5.3)
RBC # BLD AUTO: 2.97 10E6/UL (ref 3.8–5.2)
SODIUM SERPL-SCNC: 139 MMOL/L (ref 135–145)
WBC # BLD AUTO: 8.8 10E3/UL (ref 4–11)

## 2024-07-13 PROCEDURE — 255N000002 HC RX 255 OP 636: Performed by: INTERNAL MEDICINE

## 2024-07-13 PROCEDURE — 250N000013 HC RX MED GY IP 250 OP 250 PS 637: Performed by: INTERNAL MEDICINE

## 2024-07-13 PROCEDURE — 84132 ASSAY OF SERUM POTASSIUM: CPT | Performed by: INTERNAL MEDICINE

## 2024-07-13 PROCEDURE — 85610 PROTHROMBIN TIME: CPT | Performed by: INTERNAL MEDICINE

## 2024-07-13 PROCEDURE — 93926 LOWER EXTREMITY STUDY: CPT | Mod: RT

## 2024-07-13 PROCEDURE — 85610 PROTHROMBIN TIME: CPT

## 2024-07-13 PROCEDURE — A9585 GADOBUTROL INJECTION: HCPCS | Performed by: INTERNAL MEDICINE

## 2024-07-13 PROCEDURE — 73720 MRI LWR EXTREMITY W/O&W/DYE: CPT | Mod: RT

## 2024-07-13 PROCEDURE — 36415 COLL VENOUS BLD VENIPUNCTURE: CPT

## 2024-07-13 PROCEDURE — 80048 BASIC METABOLIC PNL TOTAL CA: CPT | Performed by: INTERNAL MEDICINE

## 2024-07-13 PROCEDURE — 258N000003 HC RX IP 258 OP 636: Performed by: INTERNAL MEDICINE

## 2024-07-13 PROCEDURE — 250N000011 HC RX IP 250 OP 636: Performed by: INTERNAL MEDICINE

## 2024-07-13 PROCEDURE — 250N000011 HC RX IP 250 OP 636: Performed by: EMERGENCY MEDICINE

## 2024-07-13 PROCEDURE — 36415 COLL VENOUS BLD VENIPUNCTURE: CPT | Performed by: INTERNAL MEDICINE

## 2024-07-13 PROCEDURE — 99232 SBSQ HOSP IP/OBS MODERATE 35: CPT | Performed by: INTERNAL MEDICINE

## 2024-07-13 PROCEDURE — 99221 1ST HOSP IP/OBS SF/LOW 40: CPT | Mod: FS | Performed by: SURGERY

## 2024-07-13 PROCEDURE — 85027 COMPLETE CBC AUTOMATED: CPT | Performed by: INTERNAL MEDICINE

## 2024-07-13 PROCEDURE — 250N000012 HC RX MED GY IP 250 OP 636 PS 637: Performed by: INTERNAL MEDICINE

## 2024-07-13 PROCEDURE — 120N000001 HC R&B MED SURG/OB

## 2024-07-13 RX ORDER — PIPERACILLIN SODIUM, TAZOBACTAM SODIUM 3; .375 G/15ML; G/15ML
3.38 INJECTION, POWDER, LYOPHILIZED, FOR SOLUTION INTRAVENOUS EVERY 6 HOURS
Status: DISCONTINUED | OUTPATIENT
Start: 2024-07-13 | End: 2024-07-16 | Stop reason: HOSPADM

## 2024-07-13 RX ORDER — DIAZEPAM 2 MG
2 TABLET ORAL
Status: DISCONTINUED | OUTPATIENT
Start: 2024-07-13 | End: 2024-07-16 | Stop reason: HOSPADM

## 2024-07-13 RX ORDER — POTASSIUM CHLORIDE 1500 MG/1
20 TABLET, EXTENDED RELEASE ORAL ONCE
Status: COMPLETED | OUTPATIENT
Start: 2024-07-13 | End: 2024-07-13

## 2024-07-13 RX ORDER — POTASSIUM CHLORIDE 1500 MG/1
40 TABLET, EXTENDED RELEASE ORAL ONCE
Status: COMPLETED | OUTPATIENT
Start: 2024-07-13 | End: 2024-07-13

## 2024-07-13 RX ORDER — GADOBUTROL 604.72 MG/ML
9.5 INJECTION INTRAVENOUS ONCE
Status: COMPLETED | OUTPATIENT
Start: 2024-07-13 | End: 2024-07-13

## 2024-07-13 RX ADMIN — PIPERACILLIN AND TAZOBACTAM 2.25 G: 2; .25 INJECTION, POWDER, FOR SOLUTION INTRAVENOUS at 06:29

## 2024-07-13 RX ADMIN — POTASSIUM CHLORIDE 20 MEQ: 1500 TABLET, EXTENDED RELEASE ORAL at 08:41

## 2024-07-13 RX ADMIN — INSULIN ASPART 3 UNITS: 100 INJECTION, SOLUTION INTRAVENOUS; SUBCUTANEOUS at 22:03

## 2024-07-13 RX ADMIN — INSULIN ASPART 2 UNITS: 100 INJECTION, SOLUTION INTRAVENOUS; SUBCUTANEOUS at 16:16

## 2024-07-13 RX ADMIN — ATORVASTATIN CALCIUM 40 MG: 20 TABLET, FILM COATED ORAL at 21:24

## 2024-07-13 RX ADMIN — PIPERACILLIN AND TAZOBACTAM 2.25 G: 2; .25 INJECTION, POWDER, FOR SOLUTION INTRAVENOUS at 00:25

## 2024-07-13 RX ADMIN — THERA TABS 1 TABLET: TAB at 08:41

## 2024-07-13 RX ADMIN — PIPERACILLIN AND TAZOBACTAM 3.38 G: 3; .375 INJECTION, POWDER, FOR SOLUTION INTRAVENOUS at 15:52

## 2024-07-13 RX ADMIN — INSULIN GLARGINE 20 UNITS: 100 INJECTION, SOLUTION SUBCUTANEOUS at 16:15

## 2024-07-13 RX ADMIN — MICONAZOLE NITRATE: 20 POWDER TOPICAL at 10:28

## 2024-07-13 RX ADMIN — METHADONE HYDROCHLORIDE 5 MG: 5 TABLET ORAL at 21:23

## 2024-07-13 RX ADMIN — METHADONE HYDROCHLORIDE 5 MG: 5 TABLET ORAL at 12:26

## 2024-07-13 RX ADMIN — GADOBUTROL 9.5 ML: 604.72 INJECTION INTRAVENOUS at 17:26

## 2024-07-13 RX ADMIN — METHADONE HYDROCHLORIDE 5 MG: 5 TABLET ORAL at 18:30

## 2024-07-13 RX ADMIN — METHADONE HYDROCHLORIDE 5 MG: 5 TABLET ORAL at 08:41

## 2024-07-13 RX ADMIN — METHADONE HYDROCHLORIDE 5 MG: 5 TABLET ORAL at 00:25

## 2024-07-13 RX ADMIN — DIAZEPAM 2 MG: 2 TABLET ORAL at 15:52

## 2024-07-13 RX ADMIN — PHYTONADIONE 2 MG: 2 INJECTION, EMULSION INTRAMUSCULAR; INTRAVENOUS; SUBCUTANEOUS at 12:30

## 2024-07-13 RX ADMIN — PIPERACILLIN AND TAZOBACTAM 3.38 G: 3; .375 INJECTION, POWDER, FOR SOLUTION INTRAVENOUS at 22:00

## 2024-07-13 RX ADMIN — POTASSIUM CHLORIDE 40 MEQ: 1500 TABLET, EXTENDED RELEASE ORAL at 06:30

## 2024-07-13 ASSESSMENT — ACTIVITIES OF DAILY LIVING (ADL)
ADLS_ACUITY_SCORE: 42
ADLS_ACUITY_SCORE: 48
ADLS_ACUITY_SCORE: 42
ADLS_ACUITY_SCORE: 48
ADLS_ACUITY_SCORE: 48
ADLS_ACUITY_SCORE: 42
ADLS_ACUITY_SCORE: 46
ADLS_ACUITY_SCORE: 42
ADLS_ACUITY_SCORE: 48
ADLS_ACUITY_SCORE: 46
ADLS_ACUITY_SCORE: 48
ADLS_ACUITY_SCORE: 46
ADLS_ACUITY_SCORE: 42
ADLS_ACUITY_SCORE: 48
ADLS_ACUITY_SCORE: 42
ADLS_ACUITY_SCORE: 48
ADLS_ACUITY_SCORE: 42
ADLS_ACUITY_SCORE: 42
ADLS_ACUITY_SCORE: 48
ADLS_ACUITY_SCORE: 48
ADLS_ACUITY_SCORE: 46

## 2024-07-13 ASSESSMENT — ENCOUNTER SYMPTOMS: DYSRHYTHMIAS: 1

## 2024-07-13 NOTE — PROGRESS NOTES
[facility]    Medicine Progress Note - Hospitalist Service    Date of Admission:  7/10/2024    Assessment & Plan   Linda Loredo is a 79 year old female  with type 2 diabetes, chronic right-sided heart failure, atrial fibrillation on Coumadin, hyperlipidemia, CKD, left lower extremity amputation secondary to osteomyelitis, presenting to ED today with  Altered mental status.  Hospitalized 6/6-10/24 for sepsis secondary to cellulitis of right lower extremity with 1/1 blood cultures positive for pan-sensitive S. dysgalactiae. Repeat BCX 6/7 were negative. She also had a UTI and urine culture grew providencia rettgeri - resistant to zosyn/unasyn,  susceptible to rocephin. She was treated with zosyn and vanco, changed to ceftriaxone and discharged to a TCU with ceftriaxone for 2 weeks. She was then prescribed oral cephalexin 7/3 for some skin changes that were noted on the right lower extremity.  She discharged to Nursing home 7/8. At the NH they noted that she was confused and was difficult to arouse.     Multiple admissions last 2 years     Encephalopathy  Presume due to infection, right lower extremity cellulitis vs osteomyelitis vs urinary tract infection    7/11/2024     Resolved, rapid improvement after admission    Right lower extremity cellulitis  Multiple wounds suspect mixed venous stasis and pressure, r/o ischemic component  Possible osteomyelitis of foot  Large ulcer posterior calf  Ulcers on buttock/coccyx, right lower extremity (large area on posterior calf suspect pressure/venous stasis, 2 medium sized areas right lateral mid-foot, 2 abterior shin ulcers medial and lateral). Right distal lateral mid-foot ulcer worrisome for osteomyelitis     Recently started on keflex 7/3 without improvement   On admission WBC 11, afebrile, lactate 1.4   Started on Zosyn in ED    CRP 6/19/24 193  Procalcitonin 0.25  Xray negative for osteomyelitis    ABIs 7/12/2024 -  ALMA  cannot be obtained due to noncompressible vessels. Monophasic waveforms on the right suggests arterial disease which could be further evaluated with duplex ultrasound     Seen by WOC RN 7/12/2024  Surgery consulted  7/13/2024     MRI foot refused by patient     Afebrile, WBC 10  Plan debridement in OR tomorrow if INR <=1.6   - Check right lower extremity arterial duplex  - MRI with diazepam pre-treatment  - Continue zosyn   - Blood cultures pending     Urinary tract infection  UA 7/11/2024:  >182 wbc. 24 rbc, large LE    On zosyn  - UC pending     Chronic kidney disease 3  Acute Kidney Injury, unspecified   Baseline creatinine 1.0- 1.1 (though 1.6 when last checked). Admission BUN/creatinine 84/2.0  Weight stable, down from discharge in 6/2024    BUN/creatinine improved 51/1.4  - Hold diuretics  - Follow, consider Intravenous fluids       Atrial fibrillation, likely permanent   New onset during 5/2022 admission ...  Last EKG 1/2024 - afib - flutter. EKG 9/2023 atrial fibrillation   EKG 7/10/2024 - fib - flutter, rate controlled    Telemetry rate controlled atrial fibrillation/atrial flutter . Discontinued telemetry 7/12/2024     INR elevated  Want INR tomorrow <= 1.6 for debridement  Vitamin K 2 mg IV given at 1230  - Recheck INR in 6 hours  - Pharmacy to dose warfarin    Anemia, normocytic, likely anemia of chronic disease/chronic kidney disease   Admission HGB 10.3. Baseline mostly 10-10.5  No evidence acute blood loss  B12 1349. Ferritin 189. %Fe sat 30    HGB stable 9.0 - 9.1   - Follow     Type 2 Diabetes:   Last A1c 9.9 3/2024  Monitor blood glucose levels daily    BS are mildly elevated  - Retstart Tresiba 34 in am , Lantus 20 x1  - Medium insulin sliding scale     'Chronic Right Sided Heart failure'   Pulmonary hypertension by echo and mild to moderate pulmonary hypertension by angiogram 2006  Chronic lower extremities edema  Venous stasis   Echo 5/2022 [facility] - Left Ventricle Normal left ventricular  chamber size. Normal left ventricular systolic function. Calculated left ventricular ejection  fraction (modified Pan technique) is 69 %. No regional wall motion abnormalities. Mildly increased left    ventricular wall thickness. Indeterminate left ventricular diastolic function. Right ventricle was not well visualized. Estimated right ventricular systolic pressure is 34.3 mmHg plus right atrial pressure. Estimated right ventricular systolic pressure is 47.6 mmHg.    Normal right ventricular systolic function. Mild mitral valve regurgitation. Mild tricuspid valve regurgitation.   - Hold Zaroxolyn 2.5, and torsemide 20 given the GUSTAVO, KCL 20    Hypokalemia  Replace per protocol     Obstructive sleep apnea  Sleep study 2004 reportedly showed severe obstructive sleep apnea and recommend CPAP, Not available for review. Patient is untreated      Hypercalcemia   Primary hyperparathyroidism per problem list  Work up January 2013 juan Hernández. Hypercalcemia with elevated PTH. Not subsequently addressed 2014  On admission corrected calcium is >10.1, will monitor as appropriate    - ionized calcium normal 5.0    S/p left below-knee amputation 10/2023    Fibromyalgia  Opioid dependence  - Continue with methadone 5     History of hypertension  Patient is currently not taking any medications for hypertension    Hyperlipidemia  - Continue atorvastatin 40     Obesity  Complicates cares and contributes to morbidity    Hypoalbuminemia  Albumin = 2.9 g/dL at 7/11/2024, will monitor as appropriate           Diet: Combination Diet Moderate Consistent Carb (60 g CHO per Meal) Diet; Low Saturated Fat Na <2400mg Diet, No Caffeine Diet  NPO per Anesthesia Guidelines for Procedure/Surgery Except for: Meds    DVT Prophylaxis: Warfarin  Braga Catheter: Not present  Lines: None     Cardiac Monitoring: None  Code Status: Full Code      Clinically Significant Risk Factors        # Hypokalemia: Lowest K = 2.9 mmol/L in last 2 days, will  "replace as needed       # Hypoalbuminemia: Lowest albumin = 2.9 g/dL at 7/11/2024 12:06 AM, will monitor as appropriate     # Hypertension: Noted on problem list             # DMII: A1C = N/A within past 6 months, PRESENT ON ADMISSION  # Obesity: Estimated body mass index is 35.44 kg/m  as calculated from the following:    Height as of this encounter: 1.676 m (5' 6\").    Weight as of this encounter: 99.6 kg (219 lb 9.3 oz)., PRESENT ON ADMISSION       # Financial/Environmental Concerns: none         Disposition Plan     Medically Ready for Discharge: Anticipated in 2-4 Days             Samy Tim MD  Hospitalist Service  [facility]  Securely message with DigitalChalk (more info)  Text page via surespot Paging/Directory   ______________________________________________________________________    Interval History   No events    Physical Exam   Vital Signs: Temp: 98.1  F (36.7  C) Temp src: Oral BP: 111/64 Pulse: 68   Resp: 16 SpO2: 94 % O2 Device: None (Room air)    Weight: 219 lbs 9.25 oz    Constitutional: awake, alert, cooperative, no apparent distress, and appears stated age    Medical Decision Making       35 MINUTES SPENT BY ME on the date of service doing chart review, history, exam, documentation & further activities per the note.      Data     Recent Labs   Lab 07/13/24  1505 07/13/24  0516 07/13/24  0242 07/12/24  0709 07/11/24  1129 07/11/24  1021   * 157* 173* 131* 102* 111*       CMP  Recent Labs   Lab 07/13/24  1505 07/13/24  1339 07/13/24  0516 07/13/24  0242 07/12/24  0709 07/11/24  0810 07/11/24  0006   NA  --   --  139  --  135  --  132*   POTASSIUM  --  3.9 2.9*  --  3.2*  --  3.6   CHLORIDE  --   --  99  --  96*  --  88*   CO2  --   --  32*  --  30*  --  31*   ANIONGAP  --   --  8  --  9  --  13   *  --  157* 173* 131*   < > 77   BUN  --   --  33.7*  --  51.7*  --  84.1*   CR  --   --  1.14*  --  1.45*  --  2.00*   GFRESTIMATED  --   --  49*  --  37*  --  25* "   ZAKIA  --   --  9.0  --  9.2  --  9.9   MAG  --   --   --   --  2.2  --   --    PROTTOTAL  --   --   --   --   --   --  8.3   ALBUMIN  --   --   --   --   --   --  2.9*   BILITOTAL  --   --   --   --   --   --  0.7   ALKPHOS  --   --   --   --   --   --  101   AST  --   --   --   --   --   --  56*   ALT  --   --   --   --   --   --  21    < > = values in this interval not displayed.     CBC  Recent Labs   Lab 07/13/24  0516 07/12/24  0709 07/11/24  0006   WBC 8.8 10.6 11.1*   RBC 2.97* 3.01* 3.44*   HGB 9.0* 9.1* 10.3*   HCT 27.7* 27.2* 31.1*   MCV 93 90 90   MCH 30.3 30.2 29.9   MCHC 32.5 33.5 33.1   RDW 16.1* 15.8* 15.4*    205 289     INR  Recent Labs   Lab 07/13/24  1339 07/13/24  0516 07/12/24  0709 07/11/24  0006   INR 3.30* 3.51* 4.21* 2.96*

## 2024-07-13 NOTE — PLAN OF CARE
Problem: Adult Inpatient Plan of Care  Goal: Absence of Hospital-Acquired Illness or Injury  Outcome: Progressing  Intervention: Identify and Manage Fall Risk  Recent Flowsheet Documentation  Taken 7/12/2024 2359 by Roberta Marcelino RN  Safety Promotion/Fall Prevention:   activity supervised   assistive device/personal items within reach   clutter free environment maintained   increase visualization of patient   lighting adjusted   nonskid shoes/slippers when out of bed   patient and family education  Intervention: Prevent Skin Injury  Recent Flowsheet Documentation  Taken 7/12/2024 2359 by Roberta Marcelino RN  Body Position:   position changed independently   neutral body alignment  Intervention: Prevent and Manage VTE (Venous Thromboembolism) Risk  Recent Flowsheet Documentation  Taken 7/12/2024 2359 by Roberta Marcelino RN  VTE Prevention/Management:   SCDs off (sequential compression devices)   patient refused intervention  Intervention: Prevent Infection  Recent Flowsheet Documentation  Taken 7/12/2024 2359 by Roberta Marcelino, RN  Infection Prevention:   environmental surveillance performed   equipment surfaces disinfected   hand hygiene promoted   personal protective equipment utilized   rest/sleep promoted   single patient room provided     Problem: Adult Inpatient Plan of Care  Goal: Absence of Hospital-Acquired Illness or Injury  Intervention: Identify and Manage Fall Risk  Recent Flowsheet Documentation  Taken 7/12/2024 2359 by Roberta Marcelino, RN  Safety Promotion/Fall Prevention:   activity supervised   assistive device/personal items within reach   clutter free environment maintained   increase visualization of patient   lighting adjusted   nonskid shoes/slippers when out of bed   patient and family education     Problem: Adult Inpatient Plan of Care  Goal: Absence of Hospital-Acquired Illness or Injury  Intervention: Prevent Skin Injury  Recent Flowsheet Documentation  Taken 7/12/2024 2359 by  Roberta Marcelino RN  Body Position:   position changed independently   neutral body alignment     Problem: Adult Inpatient Plan of Care  Goal: Absence of Hospital-Acquired Illness or Injury  Intervention: Prevent and Manage VTE (Venous Thromboembolism) Risk  Recent Flowsheet Documentation  Taken 7/12/2024 2359 by Roberta Marcelino RN  VTE Prevention/Management:   SCDs off (sequential compression devices)   patient refused intervention     Problem: Adult Inpatient Plan of Care  Goal: Absence of Hospital-Acquired Illness or Injury  Intervention: Prevent Infection  Recent Flowsheet Documentation  Taken 7/12/2024 2359 by Roberta Marcelino RN  Infection Prevention:   environmental surveillance performed   equipment surfaces disinfected   hand hygiene promoted   personal protective equipment utilized   rest/sleep promoted   single patient room provided     Problem: Adult Inpatient Plan of Care  Goal: Optimal Comfort and Wellbeing  Outcome: Progressing  Intervention: Provide Person-Centered Care  Recent Flowsheet Documentation  Taken 7/12/2024 2359 by Roberta Marcelino RN  Trust Relationship/Rapport:   care explained   choices provided     Problem: Adult Inpatient Plan of Care  Goal: Optimal Comfort and Wellbeing  Intervention: Provide Person-Centered Care  Recent Flowsheet Documentation  Taken 7/12/2024 2359 by Roberta Marcelino RN  Trust Relationship/Rapport:   care explained   choices provided     Problem: Adult Inpatient Plan of Care  Goal: Readiness for Transition of Care  Outcome: Progressing     Problem: Risk for Delirium  Goal: Optimal Coping  Outcome: Progressing  Intervention: Optimize Psychosocial Adjustment to Delirium  Recent Flowsheet Documentation  Taken 7/12/2024 2359 by Roberta Marcelino RN  Supportive Measures:   active listening utilized   positive reinforcement provided     Problem: Risk for Delirium  Goal: Optimal Coping  Intervention: Optimize Psychosocial Adjustment to Delirium  Recent Flowsheet  Documentation  Taken 7/12/2024 2359 by Roberta Marcelino, RN  Supportive Measures:   active listening utilized   positive reinforcement provided     Problem: Risk for Delirium  Goal: Improved Behavioral Control  Intervention: Minimize Safety Risk  Recent Flowsheet Documentation  Taken 7/12/2024 2359 by Roberta Marcelino, RN  Communication Enhancement Strategies:   call light answered in person   extra time allowed for response  Enhanced Safety Measures:   assistive devices when indicated   pain management   room near unit station  Trust Relationship/Rapport:   care explained   choices provided     Problem: Risk for Delirium  Goal: Improved Attention and Thought Clarity  Outcome: Progressing  Intervention: Maximize Cognitive Function  Recent Flowsheet Documentation  Taken 7/12/2024 2359 by Roberta Marcelino, RN  Sensory Stimulation Regulation:   care clustered   lighting decreased  Reorientation Measures: glasses use encouraged     Problem: Risk for Delirium  Goal: Improved Attention and Thought Clarity  Intervention: Maximize Cognitive Function  Recent Flowsheet Documentation  Taken 7/12/2024 2359 by Roberta Marcelino, RN  Sensory Stimulation Regulation:   care clustered   lighting decreased  Reorientation Measures: glasses use encouraged     Problem: Risk for Delirium  Goal: Improved Sleep  Outcome: Progressing     Problem: Skin or Soft Tissue Infection  Goal: Absence of Infection Signs and Symptoms  Outcome: Progressing  Intervention: Minimize and Manage Infection Progression  Recent Flowsheet Documentation  Taken 7/12/2024 2359 by Roberta Marcelino, RN  Infection Prevention:   environmental surveillance performed   equipment surfaces disinfected   hand hygiene promoted   personal protective equipment utilized   rest/sleep promoted   single patient room provided  Isolation Precautions: contact precautions maintained     Problem: Skin or Soft Tissue Infection  Goal: Absence of Infection Signs and Symptoms  Intervention:  Minimize and Manage Infection Progression  Recent Flowsheet Documentation  Taken 7/12/2024 2359 by Roberta Marcelino, RN  Infection Prevention:   environmental surveillance performed   equipment surfaces disinfected   hand hygiene promoted   personal protective equipment utilized   rest/sleep promoted   single patient room provided  Isolation Precautions: contact precautions maintained     Problem: Fall Injury Risk  Goal: Absence of Fall and Fall-Related Injury  Outcome: Progressing  Intervention: Identify and Manage Contributors  Recent Flowsheet Documentation  Taken 7/12/2024 2359 by Roberta Marcelino, RN  Medication Review/Management: medications reviewed  Intervention: Promote Injury-Free Environment  Recent Flowsheet Documentation  Taken 7/12/2024 2359 by Roberta Marcelino, RN  Safety Promotion/Fall Prevention:   activity supervised   assistive device/personal items within reach   clutter free environment maintained   increase visualization of patient   lighting adjusted   nonskid shoes/slippers when out of bed   patient and family education   Goal Outcome Evaluation:      Plan of Care Reviewed With: patient    Overall Patient Progress: no change

## 2024-07-13 NOTE — PLAN OF CARE
Problem: Adult Inpatient Plan of Care  Goal: Optimal Comfort and Wellbeing  Outcome: Progressing   Goal Outcome Evaluation: Able to make needs known. Lethargic for entire time spent with writer. Not cooperative with wanting to be checked for incontinence or repositioning.

## 2024-07-13 NOTE — PLAN OF CARE
Problem: Adult Inpatient Plan of Care  Goal: Plan of Care Review  7/13/2024 1358 by Monica Coles RN  Outcome: Not Progressing  Flowsheets (Taken 7/13/2024 1358)  Plan of Care Reviewed With: patient  Overall Patient Progress: no change  Goal: Patient-Specific Goal (Individualized)  Outcome: Not Progressing  Goal: Absence of Hospital-Acquired Illness or Injury  Outcome: Not Progressing  Intervention: Identify and Manage Fall Risk  Recent Flowsheet Documentation  Taken 7/13/2024 1357 by Monica Coles RN  Safety Promotion/Fall Prevention:   activity supervised   clutter free environment maintained   mobility aid in reach   room door open   room near nurse's station   safety round/check completed  Intervention: Prevent Skin Injury  Recent Flowsheet Documentation  Taken 7/13/2024 0900 by Monica Coles RN  Body Position:   position changed independently   neutral body alignment  Intervention: Prevent and Manage VTE (Venous Thromboembolism) Risk  Recent Flowsheet Documentation  Taken 7/13/2024 0900 by Monica Coles RN  VTE Prevention/Management:   SCDs off (sequential compression devices)   patient refused intervention  Intervention: Prevent Infection  Recent Flowsheet Documentation  Taken 7/13/2024 1357 by Monica Coles RN  Infection Prevention:   personal protective equipment utilized   rest/sleep promoted   single patient room provided  Goal: Optimal Comfort and Wellbeing  Outcome: Not Progressing  Intervention: Monitor Pain and Promote Comfort  Recent Flowsheet Documentation  Taken 7/13/2024 1358 by Monica Coles RN  Pain Management Interventions:   medication (see MAR)   repositioned  Intervention: Provide Person-Centered Care  Recent Flowsheet Documentation  Taken 7/13/2024 0900 by Monica Coles RN  Trust Relationship/Rapport:   care explained   choices provided  Goal: Readiness for Transition of Care  Outcome: Not Progressing     Problem: Risk for Delirium  Goal: Optimal  Coping  Outcome: Not Progressing  Intervention: Optimize Psychosocial Adjustment to Delirium  Recent Flowsheet Documentation  Taken 7/13/2024 1357 by Monica Coles RN  Supportive Measures:   active listening utilized   positive reinforcement provided  Goal: Improved Behavioral Control  Outcome: Not Progressing  Intervention: Minimize Safety Risk  Recent Flowsheet Documentation  Taken 7/13/2024 0900 by Monica Coles RN  Enhanced Safety Measures:   assistive devices when indicated   pain management   room near unit station  Trust Relationship/Rapport:   care explained   choices provided  Goal: Improved Attention and Thought Clarity  Outcome: Not Progressing  Intervention: Maximize Cognitive Function  Recent Flowsheet Documentation  Taken 7/13/2024 1358 by Monica Coles RN  Sensory Stimulation Regulation:   care clustered   lighting decreased  Goal: Improved Sleep  Outcome: Not Progressing  Intervention: Promote Sleep  Recent Flowsheet Documentation  Taken 7/13/2024 1358 by Monica Coles RN  Sleep/Rest Enhancement:   awakenings minimized   relaxation techniques promoted   comfort measures     Problem: Skin or Soft Tissue Infection  Goal: Absence of Infection Signs and Symptoms  7/13/2024 1357 by Monica Coles RN  Outcome: Not Progressing  7/13/2024 1254 by Monica Coles RN  Outcome: Not Progressing  Intervention: Minimize and Manage Infection Progression  Recent Flowsheet Documentation  Taken 7/13/2024 1358 by Monica Coles RN  Isolation Precautions: contact precautions maintained  7/13/2024 1357 by Monica Coles RN  Flowsheets  Taken 7/13/2024 1357  Infection Prevention:   personal protective equipment utilized   rest/sleep promoted   single patient room provided     Problem: Infection  Goal: Absence of Infection Signs and Symptoms  Outcome: Not Progressing  Intervention: Prevent or Manage Infection  Flowsheets  Taken 7/13/2024 1358  Isolation Precautions: contact precautions  maintained     Goal Outcome Evaluation:  Pt is A&O, lethargy noted, otherwise able to make her needs known appropriately. Pt continues to c/o pain to right lower extremity and buttocks d/t pressure ulcers. Scheduled methadone given bid this shift. Pt had new PIV placed this afternoon d/t previous access leaking at site. Pt received rest of AM dose of IV zosyn around 1030, pharmacy updated to request moving afternoon dose back by a few hours. This author and pt spoke with surgical team regarding potential wound debridement of necrotic pressure ulcer on right posterior calf that is not healing, pt originally reluctant to have done, but after speaking with daughter was agreeable to have done.     Pt received dose of IV Vitamin K d/t elevated INR level and high risk of bleeding if debridement was scheduled for 7/14. Pending debridement for 7/14 depending on pt's INR level. Pt on K replacement protocol, replacement given this AM, re-draw this afternoon was WDL, re-draw scheduled for tomorrow AM. Wound care completed per WOC orders. Pt reluctant to reposition throughout shift. Will continue to monitor per plan of care.        Plan of Care Reviewed With: patient    Overall Patient Progress: no change

## 2024-07-13 NOTE — CONSULTS
Surgical Consultation  [facility]  7/13/2024    Linda is seen in consultation for right lower extremity wound, at the request of Dr. Tim.    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   Patient resides in a long term care facility. Staff noted that she was less responsive than normal. She is noted to have worsening redness to her right lower extremity. She was on outpatient oral antibiotics to treat the redness.     Patient is tearful during visit. She states that she does not want surgery. She does not want care. She is complaining of pain, but does then state it is much improved from when she presented to hospital.   Her daughter joined interview via phone. Daughter lives locally, but is out of town at the moment.     Daughter expresses that she desires her mother to proceed with all recommended interventions. Patient states she does want surgery now, because she wants to proceed with what her daughter thinks is best.     Patient denies feeling fevers or chills. She notes that she does have pain to her lower extremity. She wants a debridement, she does not want another amputation.       Patient Active Problem List   Diagnosis    Type 2 diabetes mellitus with complication, with long-term current use of insulin (H)    Essential hypertension with goal blood pressure less than 140/90    Mixed stress and urge urinary incontinence    Insomnia    Mixed hyperlipidemia    Obesity with BMI >35 and comorbidity     Diabetic polyneuropathy associated with type 2 diabetes mellitus (H)    Osteoarthritis    BERLIN (obstructive sleep apnea)    Fibromyalgia    Allergic rhinitis    Anticoagulation monitoring, INR range 2-3    Microalbuminuria due to type 2 diabetes mellitus (H)    Chronic atrial fibrillation (H)    Osteopenia    Vitamin D deficiency    Umbilical hernia without obstruction and without gangrene    Primary hyperparathyroidism (H24)    Chronic pain    Opioid dependence, uncomplicated (H)    Chronic right-sided heart failure (H)    Cellulitis of right lower extremity    Chronic kidney disease, stage 3b (H)    Cardiac murmur, unspecified    Hx of osteomyelitis    Lymphedema, not elsewhere classified    Constipation    Other abnormalities of gait and mobility    Venous stasis    Acute kidney injury (H24)    Sepsis, due to unspecified organism, unspecified whether acute organ dysfunction present (H)    Metabolic encephalopathy    Pressure injury of deep tissue of sacral region    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)    Anemia    Pulmonary hypertension (H)    Acute cystitis without hematuria    Ulcer of right lower extremity (H)       Past Medical History:   Diagnosis Date    Abscess of left foot 10/31/2022    Adverse effect of anticoagulants, initial encounter 04/16/2024    GUSTAVO (acute kidney injury) (H24) 05/26/2023    Anxiety 10/20/2011    Cellulitis - bilateral lower extremities 05/27/2023    Cellulitis of buttock 05/26/2023    Decubitus ulcers - 5 present on buttocks/perineal region, numerous scabbed over and unstagable on shin and bilateral feet with some bloody blisters noted on feet 05/27/2023    Depressive disorder, not elsewhere classified     Diabetic foot ulcer (H) 09/29/2023    Elevated liver enzymes 10/20/2011    Fracture of fibula, distal, left, closed 10/20/2011    ORIF 10/13/11      Herpes zoster 06/20/2005 February 26, 2007: On gabapentin and lidoderm. She has been switched from  gabapentin to lyrica.       Herpes zoster without mention of complication     Hypercalcemia 2007    Hypoglycemia 2023    Hypotension 10/27/2011    Major depressive disorder, recurrent episode, moderate (H) 2008    Mild intermittent asthma     Mixed hyperlipidemia due to type 2 diabetes mellitus (H) 2008    Formatting of this note is different from the original.     22 ASCVD 10 year risk: 37.9%      Last Lipids:  Last Lipids:  Chol: 2021 130   63  HDL: 2021 46  Non-HDL: 2021 84  Chol/HDL Ratio: 2021 2.83  LDL DIRECT: . No results found in past 5 years   LDL CHOLESTEROL (mg/dL)   Date Value   2021 71      22   The 10-year ASCVD risk score (Teddy SMITH Jr.    Non-healing wound of left heel 2023    Osteomyelitis (H) 10/24/2023    Other urinary incontinence     Sepsis without acute organ dysfunction, due to unspecified organism (H) 2023    Skin ulcer of right lower leg with fat layer exposed (H) 2024    Supratherapeutic INR 2023    Type II or unspecified type diabetes mellitus with renal manifestations, uncontrolled(250.42) (H)     Type II or unspecified type diabetes mellitus without mention of complication, not stated as uncontrolled     Unspecified essential hypertension     Unspecified open wound, left foot, subsequent encounter 2023    UTI (urinary tract infection) - possible 2023    Warfarin-induced coagulopathy (H24) 2023       Past Surgical History:   Procedure Laterality Date    AMPUTATE LEG BELOW KNEE Left 10/7/2023    Procedure: LEFT GUILLOTINE BELOW KNEE AMPUTATION.;  Surgeon: Lan Otto MD;  Location:  OR    AMPUTATE LEG BELOW KNEE Left 10/14/2023    Procedure: debridement of left below the knee amputation;  Surgeon: Lan Otto MD;  Location:  OR    APPLY WOUND VAC Left 2024    Procedure: WOUND VAC APPLICATION TO LEFT BELOW KNEE STUMP;  Surgeon: Lan Otto  MD Williams;  Location: [facility] OR    CHOLECYSTECTOMY      GRAFT SKIN SPLIT THICKNESS FROM TRUNK TO HEAD Left 1/2/2024    Procedure: APPLICATION OF SPLIT-THICKNESS SKIN GRAFT TO LEFT BELOW KNEE STUMP, GRAFT FROM LEFT THIGH;  Surgeon: Lan Otto MD;  Location: [facility] OR    INCISION AND DRAINAGE FOOT, COMBINED Left 9/26/2023    Procedure: Surgical debridement of all nonviable skin and soft tissue and bone left foot;  Surgeon: Nolberto Thorne DPM;  Location: [facility] OR    IR LOWER EXTREMITY ANGIOGRAM LEFT  10/3/2023    ligation of fallopian tube      OPEN REDUCTION INTERNAL FIXATION ANKLE  10/13/11    left    pinning of left 2nd toe         Family History   Problem Relation Age of Onset    Hypertension Mother     Heart Disease Father         heart attack and cardiomegaly    Diabetes Sister         type 2    Hypertension Sister     Respiratory Sister     Psychotic Disorder Sister         bipolar    Cancer Maternal Grandmother         throat    Cancer Paternal Grandmother         gastric       Social History     Tobacco Use    Smoking status: Never    Smokeless tobacco: Never   Substance Use Topics    Alcohol use: No        History   Drug Use No       No current outpatient medications on file.       Allergies   Allergen Reactions    Prednisone Nausea and Vomiting    Morphine Nausea and Vomiting    Morphine [Fumaric Acid] Nausea and Vomiting    Nitrofurantoin Unknown     Per nursing home       Review of Systems:   Constitutional - Denies fevers, weight loss, malaise, lethargy  Neuro - Denies tremors or seizures  Pulmon - Denies SOB, dyspnea, hemoptysis, chronic cough or use of an inhaler  CV - Denies CP, SOB, chronic bilateral lower extremity edema now s/p amputation to left lwoer extremity, endorses difficulty w/ stairs due to physical capacity and due to mobility  GI - Denies hematemesis, BRBPR, melena, chronic diarrhea or epigastric pain   - Denies hematuria, difficulty voiding, h/o STDs  Hematology - Denies blood  "clotting disorders, chronic anemias  Dermatology - No melanomas or skin cancers, right lower extremity redness, swelling and wounds  Rheumatology - No h/o RA  Pysch - Denies depression, bipolar d/o or schizophrenia    Physical Exam:  /64 (BP Location: Right arm)   Pulse 68   Temp 98.1  F (36.7  C) (Oral)   Resp 16   Ht 1.676 m (5' 6\")   Wt 99.6 kg (219 lb 9.3 oz)   SpO2 94%   BMI 35.44 kg/m      Constitutional- No acute distress, well nourished, non-toxic  Respiratory- non-labored breathing on room air, good inspiratory effort  Cardiovascular - regular rate   Abdomen - Soft, non-tender, obese abdomen   Neuro - Alert and oriented x 3  Psych: Appropriate mood and affect, tearful and frustrated  Musculoskeletal: left lower extremity below the knee amputation. Right lower extremity with multiple foot ulcers and ulcers to anterior shin. Posterior right lower leg with large area of eschar and necrotic tissue. Skin is edematous with erythema, improved from prior photographs at ED admission. There is tenderness surrounding area of necrotic tissue. There is skin sloughing. There is tenderness and examination of wound is limited due to this.   Skin: Warm, Dry    ROUTINE IP LABS (Last four results)  BMP  Recent Labs   Lab 07/13/24  0516 07/13/24  0242 07/12/24  0709 07/11/24  1129 07/11/24  0810 07/11/24  0006     --  135  --   --  132*   POTASSIUM 2.9*  --  3.2*  --   --  3.6   CHLORIDE 99  --  96*  --   --  88*   ZAKIA 9.0  --  9.2  --   --  9.9   CO2 32*  --  30*  --   --  31*   BUN 33.7*  --  51.7*  --   --  84.1*   CR 1.14*  --  1.45*  --   --  2.00*   * 173* 131* 102*   < > 77    < > = values in this interval not displayed.     CBC  Recent Labs   Lab 07/13/24  0516 07/12/24  0709 07/11/24  0006   WBC 8.8 10.6 11.1*   RBC 2.97* 3.01* 3.44*   HGB 9.0* 9.1* 10.3*   HCT 27.7* 27.2* 31.1*   MCV 93 90 90   MCH 30.3 30.2 29.9   MCHC 32.5 33.5 33.1   RDW 16.1* 15.8* 15.4*    205 289     INR  Recent " Labs   Lab 07/13/24  0516 07/12/24  0709 07/11/24  0006   INR 3.51* 4.21* 2.96*      Imaging:  Narrative & Impression   EXAM: RESTING ANKLE-BRACHIAL INDICES (ABIs)  LOCATION: [facility]  DATE: 7/12/2024     INDICATION: non healing ulcers  COMPARISON: None.     ALMA FINDINGS:  RIGHT  Brachial: 119  Ankle (PT): Noncompressible vessels  Ankle (DP): Noncompressible vessels   Digit: 122 Index: 1.03     The right ALMA at rest cannot be obtained.     WAVEFORMS: The dorsalis pedis and posterior tibial arteries are monophasic.                                                                      IMPRESSION:  1.  RIGHT LOWER EXTREMITY: ALMA cannot be obtained due to noncompressible vessels. Monophasic waveforms on the right suggests arterial disease which could be further evaluated with duplex ultrasound as clinically indicated.       Narrative & Impression   FOOT TWO VIEWS RIGHT  7/12/2024 12:23 PM      HISTORY: ulcer, possible osteomyelitis, lateral mid foot  COMPARISON: None.                                                                      IMPRESSION: Limited evaluation due to positioning and osteopenia.  Diffuse soft tissue swelling. No definite evidence of acute  osteomyelitis or fracture. Mild-to-moderate degenerative changes  throughout the foot. Small plantar calcaneal enthesophyte. Bony  bunion. Vascular calcification.     KENNETH DOLL MD        Assessment:  1. 79 year old female with complex medical history including lymphedema and venous stasis who presented with right lower extremity cellulitis and chronic wound with necrotic tissue       Plan:   1. Hold warfarin, patient INR needs to be <1.6 for surgery   2. Case request placed for surgical debridement, planning for 7/14/2024 AM. We will reschedule if patient's INR is >1.6  3. Continue wound cares per wound ostomy nurse recommendations   4. Continue IV antibiotics: Zosyn q6hr  5. DVT ppx per medicine  6. Appreciate medicine recommendations  for medical management         MELO QUINTANA PA-C  7/13/2024 at 1:04 PM

## 2024-07-13 NOTE — PLAN OF CARE
Problem: Pain Acute  Goal: Optimal Pain Control and Function  Intervention: Develop Pain Management Plan  Recent Flowsheet Documentation  Taken 7/12/2024 1743 by Amy Song RN  Pain Management Interventions:   medication (see MAR)   repositioned     Problem: Pain Acute  Goal: Optimal Pain Control and Function  Intervention: Prevent or Manage Pain  Recent Flowsheet Documentation  Taken 7/12/2024 1630 by Amy Song RN  Sensory Stimulation Regulation:   care clustered   lighting decreased  Bowel Elimination Promotion: adequate fluid intake promoted  Medication Review/Management: medications reviewed  Taken 7/12/2024 0820 by Amy Song RN  Sensory Stimulation Regulation:   care clustered   lighting decreased  Bowel Elimination Promotion: adequate fluid intake promoted     Problem: Fall Injury Risk  Goal: Absence of Fall and Fall-Related Injury  Intervention: Promote Injury-Free Environment  Recent Flowsheet Documentation  Taken 7/12/2024 1630 by Amy Song RN  Safety Promotion/Fall Prevention:   activity supervised   assistive device/personal items within reach   clutter free environment maintained   increase visualization of patient   lighting adjusted   nonskid shoes/slippers when out of bed   patient and family education   Goal Outcome Evaluation:      Plan of Care Reviewed With: patient    Overall Patient Progress: no changeOverall Patient Progress: no change    Outcome Evaluation: Patient able to make need sknown, Scheduled methadone reordered for pain, verbalizes some relief.  New WOC placed today, dressing and cares done, clean dry intact.  Despite encouragement patient refuses offloading heal boot to RLE, refuses repositing, agreeable to weight shifting.  Purewick in place, large urinary incontinence x2, linens changed x 2.  Venous US to RLE completed today, patient refused MRI despite encouragement.  saying I just had one I'm not doing another.

## 2024-07-14 ENCOUNTER — ANESTHESIA (OUTPATIENT)
Dept: SURGERY | Facility: CLINIC | Age: 79
DRG: 853 | End: 2024-07-14
Payer: COMMERCIAL

## 2024-07-14 ENCOUNTER — APPOINTMENT (OUTPATIENT)
Dept: CT IMAGING | Facility: CLINIC | Age: 79
DRG: 853 | End: 2024-07-14
Attending: INTERNAL MEDICINE
Payer: COMMERCIAL

## 2024-07-14 LAB
ANION GAP SERPL CALCULATED.3IONS-SCNC: 9 MMOL/L (ref 7–15)
BUN SERPL-MCNC: 25.7 MG/DL (ref 8–23)
CALCIUM SERPL-MCNC: 9 MG/DL (ref 8.8–10.2)
CHLORIDE SERPL-SCNC: 99 MMOL/L (ref 98–107)
CREAT SERPL-MCNC: 0.99 MG/DL (ref 0.51–0.95)
DEPRECATED HCO3 PLAS-SCNC: 29 MMOL/L (ref 22–29)
EGFRCR SERPLBLD CKD-EPI 2021: 58 ML/MIN/1.73M2
ERYTHROCYTE [DISTWIDTH] IN BLOOD BY AUTOMATED COUNT: 16 % (ref 10–15)
GLUCOSE BLDC GLUCOMTR-MCNC: 126 MG/DL (ref 70–99)
GLUCOSE BLDC GLUCOMTR-MCNC: 136 MG/DL (ref 70–99)
GLUCOSE BLDC GLUCOMTR-MCNC: 195 MG/DL (ref 70–99)
GLUCOSE BLDC GLUCOMTR-MCNC: 257 MG/DL (ref 70–99)
GLUCOSE BLDC GLUCOMTR-MCNC: 51 MG/DL (ref 70–99)
GLUCOSE SERPL-MCNC: 148 MG/DL (ref 70–99)
HCT VFR BLD AUTO: 30.1 % (ref 35–47)
HGB BLD-MCNC: 9.7 G/DL (ref 11.7–15.7)
INR PPP: 1.4 (ref 0.85–1.15)
INR PPP: 1.59 (ref 0.85–1.15)
MCH RBC QN AUTO: 30.1 PG (ref 26.5–33)
MCHC RBC AUTO-ENTMCNC: 32.2 G/DL (ref 31.5–36.5)
MCV RBC AUTO: 94 FL (ref 78–100)
PLATELET # BLD AUTO: 210 10E3/UL (ref 150–450)
POTASSIUM SERPL-SCNC: 3.5 MMOL/L (ref 3.4–5.3)
RBC # BLD AUTO: 3.22 10E6/UL (ref 3.8–5.2)
SODIUM SERPL-SCNC: 137 MMOL/L (ref 135–145)
WBC # BLD AUTO: 10 10E3/UL (ref 4–11)

## 2024-07-14 PROCEDURE — 250N000011 HC RX IP 250 OP 636: Performed by: NURSE ANESTHETIST, CERTIFIED REGISTERED

## 2024-07-14 PROCEDURE — 250N000009 HC RX 250: Performed by: INTERNAL MEDICINE

## 2024-07-14 PROCEDURE — 0JBN0ZZ EXCISION OF RIGHT LOWER LEG SUBCUTANEOUS TISSUE AND FASCIA, OPEN APPROACH: ICD-10-PCS | Performed by: SURGERY

## 2024-07-14 PROCEDURE — 36415 COLL VENOUS BLD VENIPUNCTURE: CPT | Performed by: INTERNAL MEDICINE

## 2024-07-14 PROCEDURE — 11045 DBRDMT SUBQ TISS EACH ADDL: CPT | Performed by: SURGERY

## 2024-07-14 PROCEDURE — 99233 SBSQ HOSP IP/OBS HIGH 50: CPT | Performed by: INTERNAL MEDICINE

## 2024-07-14 PROCEDURE — 85610 PROTHROMBIN TIME: CPT | Performed by: INTERNAL MEDICINE

## 2024-07-14 PROCEDURE — 360N000075 HC SURGERY LEVEL 2, PER MIN: Performed by: SURGERY

## 2024-07-14 PROCEDURE — 250N000011 HC RX IP 250 OP 636: Performed by: INTERNAL MEDICINE

## 2024-07-14 PROCEDURE — 258N000003 HC RX IP 258 OP 636: Performed by: NURSE ANESTHETIST, CERTIFIED REGISTERED

## 2024-07-14 PROCEDURE — 250N000013 HC RX MED GY IP 250 OP 250 PS 637: Performed by: INTERNAL MEDICINE

## 2024-07-14 PROCEDURE — 120N000001 HC R&B MED SURG/OB

## 2024-07-14 PROCEDURE — 250N000009 HC RX 250: Performed by: NURSE ANESTHETIST, CERTIFIED REGISTERED

## 2024-07-14 PROCEDURE — 370N000017 HC ANESTHESIA TECHNICAL FEE, PER MIN: Performed by: SURGERY

## 2024-07-14 PROCEDURE — 11042 DBRDMT SUBQ TIS 1ST 20SQCM/<: CPT | Performed by: SURGERY

## 2024-07-14 PROCEDURE — 73706 CT ANGIO LWR EXTR W/O&W/DYE: CPT | Mod: RT

## 2024-07-14 PROCEDURE — 85018 HEMOGLOBIN: CPT | Performed by: INTERNAL MEDICINE

## 2024-07-14 PROCEDURE — 272N000001 HC OR GENERAL SUPPLY STERILE: Performed by: SURGERY

## 2024-07-14 PROCEDURE — 710N000012 HC RECOVERY PHASE 2, PER MINUTE: Performed by: SURGERY

## 2024-07-14 PROCEDURE — 80048 BASIC METABOLIC PNL TOTAL CA: CPT | Performed by: INTERNAL MEDICINE

## 2024-07-14 RX ORDER — ONDANSETRON 4 MG/1
4 TABLET, ORALLY DISINTEGRATING ORAL EVERY 30 MIN PRN
Status: DISCONTINUED | OUTPATIENT
Start: 2024-07-14 | End: 2024-07-14 | Stop reason: HOSPADM

## 2024-07-14 RX ORDER — KETAMINE HYDROCHLORIDE 10 MG/ML
INJECTION INTRAMUSCULAR; INTRAVENOUS PRN
Status: DISCONTINUED | OUTPATIENT
Start: 2024-07-14 | End: 2024-07-14

## 2024-07-14 RX ORDER — SODIUM CHLORIDE 9 MG/ML
INJECTION, SOLUTION INTRAVENOUS CONTINUOUS PRN
Status: DISCONTINUED | OUTPATIENT
Start: 2024-07-14 | End: 2024-07-14

## 2024-07-14 RX ORDER — PIPERACILLIN SODIUM, TAZOBACTAM SODIUM 3; .375 G/15ML; G/15ML
3.38 INJECTION, POWDER, LYOPHILIZED, FOR SOLUTION INTRAVENOUS ONCE
Status: COMPLETED | OUTPATIENT
Start: 2024-07-14 | End: 2024-07-14

## 2024-07-14 RX ORDER — HYDROMORPHONE HCL IN WATER/PF 6 MG/30 ML
0.4 PATIENT CONTROLLED ANALGESIA SYRINGE INTRAVENOUS EVERY 5 MIN PRN
Status: DISCONTINUED | OUTPATIENT
Start: 2024-07-14 | End: 2024-07-14 | Stop reason: HOSPADM

## 2024-07-14 RX ORDER — FENTANYL CITRATE 50 UG/ML
50 INJECTION, SOLUTION INTRAMUSCULAR; INTRAVENOUS EVERY 5 MIN PRN
Status: DISCONTINUED | OUTPATIENT
Start: 2024-07-14 | End: 2024-07-14 | Stop reason: HOSPADM

## 2024-07-14 RX ORDER — HYDROXYZINE HYDROCHLORIDE 10 MG/1
10 TABLET, FILM COATED ORAL EVERY 6 HOURS PRN
Status: DISCONTINUED | OUTPATIENT
Start: 2024-07-14 | End: 2024-07-14 | Stop reason: HOSPADM

## 2024-07-14 RX ORDER — ONDANSETRON 2 MG/ML
4 INJECTION INTRAMUSCULAR; INTRAVENOUS EVERY 30 MIN PRN
Status: DISCONTINUED | OUTPATIENT
Start: 2024-07-14 | End: 2024-07-14 | Stop reason: HOSPADM

## 2024-07-14 RX ORDER — DEXAMETHASONE SODIUM PHOSPHATE 4 MG/ML
4 INJECTION, SOLUTION INTRA-ARTICULAR; INTRALESIONAL; INTRAMUSCULAR; INTRAVENOUS; SOFT TISSUE
Status: DISCONTINUED | OUTPATIENT
Start: 2024-07-14 | End: 2024-07-14 | Stop reason: HOSPADM

## 2024-07-14 RX ORDER — SODIUM CHLORIDE, SODIUM LACTATE, POTASSIUM CHLORIDE, CALCIUM CHLORIDE 600; 310; 30; 20 MG/100ML; MG/100ML; MG/100ML; MG/100ML
INJECTION, SOLUTION INTRAVENOUS CONTINUOUS
Status: DISCONTINUED | OUTPATIENT
Start: 2024-07-14 | End: 2024-07-14 | Stop reason: HOSPADM

## 2024-07-14 RX ORDER — NALOXONE HYDROCHLORIDE 0.4 MG/ML
0.1 INJECTION, SOLUTION INTRAMUSCULAR; INTRAVENOUS; SUBCUTANEOUS
Status: DISCONTINUED | OUTPATIENT
Start: 2024-07-14 | End: 2024-07-14 | Stop reason: HOSPADM

## 2024-07-14 RX ORDER — PROPOFOL 10 MG/ML
INJECTION, EMULSION INTRAVENOUS CONTINUOUS PRN
Status: DISCONTINUED | OUTPATIENT
Start: 2024-07-14 | End: 2024-07-14

## 2024-07-14 RX ORDER — LIDOCAINE 40 MG/G
CREAM TOPICAL
Status: DISCONTINUED | OUTPATIENT
Start: 2024-07-14 | End: 2024-07-14 | Stop reason: HOSPADM

## 2024-07-14 RX ORDER — LIDOCAINE HYDROCHLORIDE 20 MG/ML
INJECTION, SOLUTION INFILTRATION; PERINEURAL PRN
Status: DISCONTINUED | OUTPATIENT
Start: 2024-07-14 | End: 2024-07-14

## 2024-07-14 RX ORDER — ALBUTEROL SULFATE 0.83 MG/ML
2.5 SOLUTION RESPIRATORY (INHALATION) EVERY 4 HOURS PRN
Status: DISCONTINUED | OUTPATIENT
Start: 2024-07-14 | End: 2024-07-14 | Stop reason: HOSPADM

## 2024-07-14 RX ORDER — GLYCOPYRROLATE 0.2 MG/ML
INJECTION, SOLUTION INTRAMUSCULAR; INTRAVENOUS PRN
Status: DISCONTINUED | OUTPATIENT
Start: 2024-07-14 | End: 2024-07-14

## 2024-07-14 RX ORDER — IOPAMIDOL 755 MG/ML
100 INJECTION, SOLUTION INTRAVASCULAR ONCE
Status: COMPLETED | OUTPATIENT
Start: 2024-07-14 | End: 2024-07-14

## 2024-07-14 RX ORDER — LORAZEPAM 2 MG/ML
1 INJECTION INTRAMUSCULAR ONCE
Status: DISCONTINUED | OUTPATIENT
Start: 2024-07-14 | End: 2024-07-16 | Stop reason: HOSPADM

## 2024-07-14 RX ADMIN — MICONAZOLE NITRATE: 20 POWDER TOPICAL at 08:06

## 2024-07-14 RX ADMIN — SODIUM CHLORIDE: 0.9 INJECTION, SOLUTION INTRAVENOUS at 10:42

## 2024-07-14 RX ADMIN — THERA TABS 1 TABLET: TAB at 07:58

## 2024-07-14 RX ADMIN — METHADONE HYDROCHLORIDE 5 MG: 5 TABLET ORAL at 17:35

## 2024-07-14 RX ADMIN — PIPERACILLIN AND TAZOBACTAM 3.38 G: 3; .375 INJECTION, POWDER, FOR SOLUTION INTRAVENOUS at 17:45

## 2024-07-14 RX ADMIN — KETAMINE HYDROCHLORIDE 50 MG: 10 INJECTION INTRAMUSCULAR; INTRAVENOUS at 10:46

## 2024-07-14 RX ADMIN — MIDAZOLAM 2 MG: 1 INJECTION INTRAMUSCULAR; INTRAVENOUS at 10:41

## 2024-07-14 RX ADMIN — LIDOCAINE HYDROCHLORIDE 100 MG: 20 INJECTION, SOLUTION INFILTRATION; PERINEURAL at 10:47

## 2024-07-14 RX ADMIN — ATORVASTATIN CALCIUM 40 MG: 20 TABLET, FILM COATED ORAL at 20:59

## 2024-07-14 RX ADMIN — SODIUM CHLORIDE 100 ML: 9 INJECTION, SOLUTION INTRAVENOUS at 19:05

## 2024-07-14 RX ADMIN — GLYCOPYRROLATE 0.2 MG: 0.2 INJECTION, SOLUTION INTRAMUSCULAR; INTRAVENOUS at 11:00

## 2024-07-14 RX ADMIN — PIPERACILLIN AND TAZOBACTAM 3.38 G: 3; .375 INJECTION, POWDER, FOR SOLUTION INTRAVENOUS at 11:31

## 2024-07-14 RX ADMIN — METHADONE HYDROCHLORIDE 5 MG: 5 TABLET ORAL at 12:17

## 2024-07-14 RX ADMIN — INSULIN ASPART 2 UNITS: 100 INJECTION, SOLUTION INTRAVENOUS; SUBCUTANEOUS at 02:43

## 2024-07-14 RX ADMIN — METHADONE HYDROCHLORIDE 5 MG: 5 TABLET ORAL at 07:58

## 2024-07-14 RX ADMIN — INSULIN DEGLUDEC 34 UNITS: 100 INJECTION, SOLUTION SUBCUTANEOUS at 07:58

## 2024-07-14 RX ADMIN — IOPAMIDOL 100 ML: 755 INJECTION, SOLUTION INTRAVENOUS at 19:05

## 2024-07-14 RX ADMIN — ACETAMINOPHEN 1000 MG: 500 TABLET, FILM COATED ORAL at 12:17

## 2024-07-14 RX ADMIN — METHADONE HYDROCHLORIDE 5 MG: 5 TABLET ORAL at 22:46

## 2024-07-14 RX ADMIN — POTASSIUM CHLORIDE 20 MEQ: 1500 TABLET, EXTENDED RELEASE ORAL at 07:58

## 2024-07-14 RX ADMIN — PROPOFOL 100 MCG/KG/MIN: 10 INJECTION, EMULSION INTRAVENOUS at 10:46

## 2024-07-14 RX ADMIN — PIPERACILLIN AND TAZOBACTAM 3.38 G: 3; .375 INJECTION, POWDER, FOR SOLUTION INTRAVENOUS at 11:29

## 2024-07-14 RX ADMIN — MICONAZOLE NITRATE: 20 POWDER TOPICAL at 21:04

## 2024-07-14 RX ADMIN — PIPERACILLIN AND TAZOBACTAM 3.38 G: 3; .375 INJECTION, POWDER, FOR SOLUTION INTRAVENOUS at 04:23

## 2024-07-14 ASSESSMENT — ACTIVITIES OF DAILY LIVING (ADL)
ADLS_ACUITY_SCORE: 47
ADLS_ACUITY_SCORE: 51
ADLS_ACUITY_SCORE: 47
ADLS_ACUITY_SCORE: 43
ADLS_ACUITY_SCORE: 48
ADLS_ACUITY_SCORE: 51
ADLS_ACUITY_SCORE: 48
ADLS_ACUITY_SCORE: 47
ADLS_ACUITY_SCORE: 51
ADLS_ACUITY_SCORE: 47
ADLS_ACUITY_SCORE: 47
ADLS_ACUITY_SCORE: 48
ADLS_ACUITY_SCORE: 47
ADLS_ACUITY_SCORE: 47
ADLS_ACUITY_SCORE: 48
ADLS_ACUITY_SCORE: 51
ADLS_ACUITY_SCORE: 48
ADLS_ACUITY_SCORE: 47
ADLS_ACUITY_SCORE: 48
ADLS_ACUITY_SCORE: 51

## 2024-07-14 NOTE — ANESTHESIA POSTPROCEDURE EVALUATION
Patient: Linda Loredo    Procedure: Procedure(s):  IRRIGATION AND DEBRIDEMENT, LOWER EXTREMITY, RIGHT LOWER LEG       Anesthesia Type:  General    Note:  Disposition: Inpatient   Postop Pain Control:             Sign Out: Pain at Preoperative BASELINE   PONV:    Neuro/Psych:             Sign Out: Acceptable/Baseline neuro status   Airway/Respiratory:             Sign Out: Acceptable/Baseline resp. status   CV/Hemodynamics:             Sign Out: Acceptable CV status   Other NRE: NONE   DID A NON-ROUTINE EVENT OCCUR? No       Last vitals:  Vitals Value Taken Time   /66 07/14/24 1502   Temp 36.8  C (98.2  F) 07/14/24 1125   Pulse 123 07/14/24 1512   Resp 9 07/14/24 1512   SpO2 99 % 07/14/24 1512   Vitals shown include unfiled device data.    Electronically Signed By: MOE Loera CRNA  July 14, 2024  4:19 PM

## 2024-07-14 NOTE — PROGRESS NOTES
[facility] TRANSPORT NOTE  Data:   Reason for Transport:  Irrigation and debridement    Linda Lroedo was transported to surgery via bed at 1030.  Patient was accompanied by Registered Nurse, Nursing Assistant, and Anesthesiologist. Equipment used for transport: None. Family was aware of reason for transport: yes    Action:  Report: given to Surgical nurse      Response:  Patient's condition when transferred off unit was stable.    Mariana Gilliam RN

## 2024-07-14 NOTE — PROGRESS NOTES
[facility]    Hospitalist Progress Note    Date of Service (when I saw the patient): 07/14/2024    Assessment & Plan   Linda DELGADILLO Awa Loredo is a 79 year old female presented to ED on 7/10/2024 with altered mental status.      Metabolic encephalopathy  Presume due to infection, right lower extremity cellulitis vs osteomyelitis vs urinary tract infection    7/11/2024   -Resolved, rapid improvement after admission     Right lower extremity cellulitis  Multiple wounds suspect mixed venous stasis and pressure, r/o ischemic component  Possible osteomyelitis of foot  Large ulcer posterior calf  Ulcers on buttock/coccyx, right lower extremity (large area on posterior calf suspect pressure/venous stasis, 2 medium sized areas right lateral mid-foot, 2 abterior shin ulcers medial and lateral). Right distal lateral mid-foot ulcer worrisome for osteomyelitis     Recently started on keflex 7/3 without improvement.  On admission WBC 11, afebrile, lactate 1.4.  Started on Zosyn in ED.  CRP 6/19/24 193.  Procalcitonin 0.25.  Xray negative for osteomyelitis    ABIs 7/12/2024 -  ALMA cannot be obtained due to noncompressible vessels. Monophasic waveforms on the right suggests arterial disease which could be further evaluated with duplex ultrasound     Right lower extremity arterial duplex with sluggish arterial flow with no focal stenosis.    MRI right foot shows no evidence of osteomyelitis, septic arthropathy, or abscess.  -Continue zosyn   -Blood cultures x 2 from 7/11 shows no growth x 3 days  -Patient underwent debridement in the OR on 7/14/2024, postoperative cares per general surgery  -CT angiogram right lower extremity to evaluate for arterial stenoses, pretreat with Ativan for claustrophobia     Urinary tract infection  Recently hospitalized 6/6-10/24 for sepsis secondary to cellulitis of right lower extremity with 1/1 blood cultures positive for pan-sensitive S. dysgalactiae. Repeat BCX  6/7 were negative. She also had a UTI and urine culture grew providencia rettgeri - resistant to zosyn/unasyn,  susceptible to rocephin. She was treated with zosyn and vanco, changed to ceftriaxone and discharged to a TCU with ceftriaxone for 2 weeks.  UA 7/11/2024:  >182 wbc. 24 rbc, large LE   -On zosyn  -UC from 7/11 shows less than 10K urogenital danny, but collected after zosyn given.     Chronic kidney disease 3a  Acute Kidney Injury, unspecified   Baseline creatinine 1.0- 1.1 (though 1.6 when last checked). Admission BUN/creatinine 84/2.0  Weight stable, down from discharge in 6/2024  -BUN/creatinine improved 25.7 / 0.99  -Hold diuretics  -Repeat BMP in a.m.     Atrial fibrillation, likely permanent   New onset during 5/2022 admission ...    Last EKG 1/2024 - afib - flutter. EKG 9/2023 atrial fibrillation     EKG 7/10/2024 - fib - flutter, rate controlled    Telemetry rate controlled atrial fibrillation/atrial flutter . Discontinued telemetry 7/12/2024   Vitamin K 2 mg IV given at 1230  -INR 1.40 on 7/14/2024, prior to surgery  -Hold Coumadin     Anemia, normocytic, likely anemia of chronic disease/chronic kidney disease   Admission HGB 10.3. Baseline mostly 10-10.5.  No evidence acute blood loss    B12 1349. Ferritin 189. %Fe sat 30     HGB stable 9.0 - 9.1   -Follow      Type 2 Diabetes:   Last A1c 9.9 3/2024  -Retstart Tresiba 34 in am , Lantus 20 x1  -Medium insulin sliding scale      'Chronic Right Sided Heart failure'   Pulmonary hypertension by echo and mild to moderate pulmonary hypertension by angiogram 2006  Chronic lower extremities edema  Venous stasis      Echo 5/2022 [facility] - Left Ventricle Normal left ventricular chamber size. Normal left ventricular systolic function. Calculated left ventricular ejection  fraction (modified Pan technique) is 69 %. No regional wall motion abnormalities. Mildly increased left ventricular wall thickness. Indeterminate left ventricular diastolic  function. Right ventricle was not well visualized. Estimated right ventricular systolic pressure is 34.3 mmHg plus right atrial pressure. Estimated right ventricular systolic pressure is 47.6 mmHg.      Normal right ventricular systolic function. Mild mitral valve regurgitation. Mild tricuspid valve regurgitation.   -Hold Zaroxolyn 2.5, and torsemide 20 given the GUSTAVO, KCL 20     Hypokalemia  Replace per protocol      Obstructive sleep apnea  Sleep study 2004 reportedly showed severe obstructive sleep apnea and recommend CPAP, Not available for review. Patient is untreated      Hypercalcemia   Primary hyperparathyroidism per problem list  Work up January 2013 juan Hernández. Hypercalcemia with elevated PTH. Not subsequently addressed 2014  On admission corrected calcium is >10.1, will monitor as appropriate    -ionized calcium normal 5.0     S/p left below-knee amputation 10/2023     Fibromyalgia  Opioid dependence  -Continue with methadone 5     History of hypertension  Patient is currently not taking any medications for hypertension     Hyperlipidemia  -Continue atorvastatin 40     Obesity  Complicates cares and contributes to morbidity     Hypoalbuminemia  Albumin = 2.9 g/dL at 7/11/2024, will monitor as appropriate     Diet: Combination Diet Moderate Consistent Carb (60 g CHO per Meal) Diet; Low Saturated Fat Na <2400mg Diet, No Caffeine Diet  NPO per Anesthesia Guidelines for Procedure/Surgery Except for: Meds    DVT Prophylaxis: Warfarin  Braga Catheter: Not present  Lines: None     Cardiac Monitoring: None  Code Status: Full Code       Disposition: Expected discharge pending need for future debridement.    50 MINUTES SPENT BY ME on the date of service doing chart review, history, exam, documentation & further activities per the note.    Jimmy Barfield MD    Interval History   Patient chief complaint is anxiety prior to surgery.  States her pain is well-controlled.    -Data reviewed today: I reviewed  all new labs and imaging results over the last 24 hours. I personally reviewed no images or EKG's today.    Physical Exam   Temp: 98.2  F (36.8  C) Temp src: Axillary BP: 126/87 Pulse: 104   Resp: 16 SpO2: 99 % O2 Device: None (Room air)    Vitals:    07/11/24 0302   Weight: 99.6 kg (219 lb 9.3 oz)     Vital Signs with Ranges  Temp:  [97.8  F (36.6  C)-99.6  F (37.6  C)] 98.2  F (36.8  C)  Pulse:  [] 104  Resp:  [16-18] 16  BP: (105-128)/(56-87) 126/87  SpO2:  [94 %-100 %] 99 %  I/O last 3 completed shifts:  In: 1560 [P.O.:1560]  Out: 1200 [Urine:1200]    Gen: Well nourished, debilitated elderly female, alert and oriented x 3, no acute distressed  HEENT: Atraumatic, normocephalic; sclera non-injected, anicterric; oral mucosa moist, no lesion, no exudate  Lungs: Clear to ausculation, no wheezes, no rhonchi, no rales  Heart: Regular rate, regular rhythm, no gallops, no rubs, no murmurs  GI: Bowel sound normal, no hepatosplenomegaly, no masses, non-tender, non-distended, no guarding, no rebound tenderness  Lymph: No lymphadenopathy,  Skin: LLE BKA, RLE bandaged below knee    Medications   Current Facility-Administered Medications   Medication Dose Route Frequency Provider Last Rate Last Admin    Patient is already receiving anticoagulation with heparin, enoxaparin (LOVENOX), warfarin (COUMADIN)  or other anticoagulant medication   Does not apply Continuous PRN Amy Robles MD         Current Facility-Administered Medications   Medication Dose Route Frequency Provider Last Rate Last Admin    atorvastatin (LIPITOR) tablet 40 mg  40 mg Oral QPM Amy Robles MD   40 mg at 07/13/24 2124    [Held by provider] furosemide (LASIX) injection 40 mg  40 mg Intravenous Daily Amy Robles MD        insulin aspart (NovoLOG) injection (RAPID ACTING)  1-7 Units Subcutaneous TID AC Samy Tim MD   2 Units at 07/13/24 1615    insulin aspart (NovoLOG) injection (RAPID ACTING)  1-5 Units Subcutaneous At  Bedtime Samy Tim MD   2 Units at 07/13/24 2203    insulin aspart (NovoLOG) injection (RAPID ACTING)  1-7 Units Subcutaneous Q4H Samy Tim MD   2 Units at 07/14/24 0243    insulin degludec (TRESIBA) 100 UNIT/ML injection 34 Units  34 Units Subcutaneous QAM Samy Tim MD   34 Units at 07/14/24 0758    methadone (DOLOPHINE) half-tab 5 mg  5 mg Oral 4x Daily Samy Tim MD   5 mg at 07/14/24 0758    [Held by provider] metolazone (ZAROXOLYN) tablet 2.5 mg  2.5 mg Oral Weekly Amy Robles MD        miconazole (MICATIN) 2 % powder   Topical BID Amy Robles MD   Given at 07/14/24 0806    multivitamin, therapeutic (THERA-VIT) tablet 1 tablet  1 tablet Oral Daily Samy Tim MD   1 tablet at 07/14/24 0758    piperacillin-tazobactam (ZOSYN) 3.375 g vial to attach to  mL bag  3.375 g Intravenous Q6H Samy Tim MD   3.375 g at 07/14/24 1129    potassium chloride aldair ER (KLOR-CON M20) CR tablet 20 mEq  20 mEq Oral Daily Samy Tim MD   20 mEq at 07/14/24 0758    sodium chloride (PF) 0.9% PF flush 3 mL  3 mL Intracatheter Q8H Amy Robles MD   3 mL at 07/14/24 0806    [Held by provider] torsemide (DEMADEX) tablet 60 mg  60 mg Oral Daily Amy Robles MD        [Held by provider] Warfarin Dose Required Daily - Pharmacist Managed  1 each Oral See Admin Instructions Samy Tim MD           Data   Recent Labs   Lab 07/14/24  0749 07/14/24  0614 07/14/24  0527 07/14/24  0214 07/13/24  2352 07/13/24  2150 07/13/24  1505 07/13/24  1339 07/13/24  0516 07/13/24  0242 07/12/24  0709 07/11/24  0810 07/11/24  0006   WBC  --   --  10.0  --   --   --   --   --  8.8  --  10.6  --  11.1*   HGB  --   --  9.7*  --   --   --   --   --  9.0*  --  9.1*  --  10.3*   MCV  --   --  94  --   --   --   --   --  93  --  90  --  90   PLT  --   --  210  --   --   --   --   --  205  --  205  --  289   INR  --   --  1.40*  --  1.59* 1.76*  --  3.30* 3.51*  --  4.21*  --  2.96*   NA  --   --  137   --   --   --   --   --  139  --  135  --  132*   POTASSIUM  --   --  3.5  --   --   --   --  3.9 2.9*  --  3.2*  --  3.6   CHLORIDE  --   --  99  --   --   --   --   --  99  --  96*  --  88*   CO2  --   --  29  --   --   --   --   --  32*  --  30*  --  31*   BUN  --   --  25.7*  --   --   --   --   --  33.7*  --  51.7*  --  84.1*   CR  --   --  0.99*  --   --   --   --   --  1.14*  --  1.45*  --  2.00*   ANIONGAP  --   --  9  --   --   --   --   --  8  --  9  --  13   ZAKIA  --   --  9.0  --   --   --   --   --  9.0  --  9.2  --  9.9   * 136* 148*   < >  --   --    < >  --  157*   < > 131*   < > 77   ALBUMIN  --   --   --   --   --   --   --   --   --   --   --   --  2.9*   PROTTOTAL  --   --   --   --   --   --   --   --   --   --   --   --  8.3   BILITOTAL  --   --   --   --   --   --   --   --   --   --   --   --  0.7   ALKPHOS  --   --   --   --   --   --   --   --   --   --   --   --  101   ALT  --   --   --   --   --   --   --   --   --   --   --   --  21   AST  --   --   --   --   --   --   --   --   --   --   --   --  56*    < > = values in this interval not displayed.       Recent Results (from the past 24 hour(s))   MR Foot Right w/o & w Contrast    Narrative    EXAM: MR FOOT RIGHT W/O and W CONTRAST  LOCATION: [facility]  DATE: 07/13/2024    INDICATION: Ulcer, possible osteomyelitis, pain and swelling.  COMPARISON: None.  TECHNIQUE: Routine. Additional post-gadolinium T1 sequences were obtained.  IV CONTRAST: Gadavist 9.5 mL.    FINDINGS:   JOINTS AND BONES:   -No evidence for fracture. No evidence for osteomyelitis. No effusion to suggest septic arthropathy. Degenerative change 1st MTP joint. Hammertoe deformities.    TENDONS:   -The flexor and extensor tendons are negative for tendinopathy, tenosynovitis, or tearing. The hallucis tendons are negative.     LIGAMENTS:   -The ligament of Lisfranc is intact. The collateral ligaments at the MTP joints are all  intact.    MUSCLES AND SOFT TISSUES:   -Fatty infiltration and atrophy of the plantar and interosseous musculature. Edema or cellulitis along the dorsal aspect of the foot.      Impression    IMPRESSION:  1.  No evidence for osteomyelitis, septic arthropathy, or abscess.  2.  No evidence for fracture.  3.  Hammertoe deformities.  4.  Degenerative change 1st MTP joint.  5.  Fatty infiltration and atrophy of the plantar and interosseous musculature.  6.  Edema or cellulitis along the dorsal aspect of the foot.

## 2024-07-14 NOTE — PROGRESS NOTES
Brief General Surgery Note  7/14/2024  9:38 AM    Planning for surgical debridement.   No improvement in wound.   No medical change in patient  Patient desires procedure.   INR is <1.6

## 2024-07-14 NOTE — ANESTHESIA PREPROCEDURE EVALUATION
Anesthesia Pre-Procedure Evaluation    Patient: Linda Loredo   MRN: [id number] : 1945        Procedure : Procedure(s):  IRRIGATION AND DEBRIDEMENT, LOWER EXTREMITY, RIGHT LOWER LEG          Past Medical History:   Diagnosis Date     Abscess of left foot 10/31/2022     Adverse effect of anticoagulants, initial encounter 2024     GUSTAVO (acute kidney injury) (H24) 2023     Anxiety 10/20/2011     Cellulitis - bilateral lower extremities 2023     Cellulitis of buttock 2023     Decubitus ulcers - 5 present on buttocks/perineal region, numerous scabbed over and unstagable on shin and bilateral feet with some bloody blisters noted on feet 2023     Depressive disorder, not elsewhere classified      Diabetic foot ulcer (H) 2023     Elevated liver enzymes 10/20/2011     Fracture of fibula, distal, left, closed 10/20/2011    ORIF 10/13/11       Herpes zoster 2005: On gabapentin and lidoderm. She has been switched from gabapentin to lyrica.        Herpes zoster without mention of complication      Hypercalcemia 2007     Hypoglycemia 2023     Hypotension 10/27/2011     Major depressive disorder, recurrent episode, moderate (H) 2008     Mild intermittent asthma      Mixed hyperlipidemia due to type 2 diabetes mellitus (H) 2008    Formatting of this note is different from the original.     22 ASCVD 10 year risk: 37.9%      Last Lipids:  Last Lipids:  Chol: 2021 130   63  HDL: 2021 46  Non-HDL: 2021 84  Chol/HDL Ratio: 2021 2.83  LDL DIRECT: . No results found in past 5 years   LDL CHOLESTEROL (mg/dL)   Date Value   2021 71      22   The 10-year ASCVD risk score ([place] SMITH Jr.     Non-healing wound of left heel 2023     Osteomyelitis (H) 10/24/2023     Other urinary incontinence      Sepsis without acute organ dysfunction, due to unspecified organism (H) 2023     Skin  ulcer of right lower leg with fat layer exposed (H) 02/26/2024     Supratherapeutic INR 05/27/2023     Type II or unspecified type diabetes mellitus with renal manifestations, uncontrolled(250.42) (H)      Type II or unspecified type diabetes mellitus without mention of complication, not stated as uncontrolled      Unspecified essential hypertension      Unspecified open wound, left foot, subsequent encounter 09/22/2023     UTI (urinary tract infection) - possible 05/26/2023     Warfarin-induced coagulopathy (H24) 05/27/2023      Past Surgical History:   Procedure Laterality Date     AMPUTATE LEG BELOW KNEE Left 10/7/2023    Procedure: LEFT GUILLOTINE BELOW KNEE AMPUTATION.;  Surgeon: Lan Otto MD;  Location:  OR     AMPUTATE LEG BELOW KNEE Left 10/14/2023    Procedure: debridement of left below the knee amputation;  Surgeon: Lan Otto MD;  Location:  OR     APPLY WOUND VAC Left 1/2/2024    Procedure: WOUND VAC APPLICATION TO LEFT BELOW KNEE STUMP;  Surgeon: Lan Otto MD;  Location:  OR     CHOLECYSTECTOMY       GRAFT SKIN SPLIT THICKNESS FROM TRUNK TO HEAD Left 1/2/2024    Procedure: APPLICATION OF SPLIT-THICKNESS SKIN GRAFT TO LEFT BELOW KNEE STUMP, GRAFT FROM LEFT THIGH;  Surgeon: Lan Otto MD;  Location:  OR     INCISION AND DRAINAGE FOOT, COMBINED Left 9/26/2023    Procedure: Surgical debridement of all nonviable skin and soft tissue and bone left foot;  Surgeon: Nolberto Thorne DPM;  Location: [facility] OR     IR LOWER EXTREMITY ANGIOGRAM LEFT  10/3/2023     ligation of fallopian tube       OPEN REDUCTION INTERNAL FIXATION ANKLE  10/13/11    left     pinning of left 2nd toe        Allergies   Allergen Reactions     Prednisone Nausea and Vomiting     Morphine Nausea and Vomiting     Morphine [Fumaric Acid] Nausea and Vomiting     Nitrofurantoin Unknown     Per nursing home      Social History     Tobacco Use     Smoking status: Never     Smokeless  tobacco: Never   Substance Use Topics     Alcohol use: No      Wt Readings from Last 1 Encounters:   07/11/24 99.6 kg (219 lb 9.3 oz)        Anesthesia Evaluation   Pt has had prior anesthetic.         ROS/MED HX  ENT/Pulmonary:     (+) sleep apnea,          allergic rhinitis,           asthma                  Neurologic: Comment: Metabolic encephalopathy       Cardiovascular:     (+) Dyslipidemia hypertension- Peripheral Vascular Disease-   -  - -   Taking blood thinners   CHF (Right sided)  Last EF: 69% date: 05/2022               dysrhythmias, a-fib,  valvular problems/murmurs type: MR TR.   pulmonary hypertension, Previous cardiac testing   Echo: Date: 05/31/2022 Results:  Estimated EF: 65-70%    FINDINGS    Left Ventricle Normal left ventricular chamber size. Normal left ventricular systolic function. Calculated left ventricular ejection    fraction (modified Pan technique) is 69 %. No regional wall motion abnormalities. Mildly increased left    ventricular wall thickness.    Diastolic Function Indeterminate left ventricular diastolic function.    Right Ventricle Normal right ventricular chamber size. Right ventricle was not well visualized. Estimated right ventricular systolic    pressure is 34.3 mmHg plus right atrial pressure. Estimated right ventricular systolic pressure is 47.6 mmHg.    Normal right ventricular systolic function.     Stress Test:  Date: Results:    ECG Reviewed:  Date: 07/11/2024 Results:  Atrial fibrillation   -Inferior infarct -age undetermined -Old anterior infarct.    ABNORMAL  Cath:  Date: Results:      METS/Exercise Tolerance:     Hematologic:     (+)      anemia,          Musculoskeletal: Comment: Osteoarthritis  Left BKA  Pressure ulcers  (+)  arthritis,             GI/Hepatic:       Renal/Genitourinary: Comment: Mixed stress and urge urinary incontinence   Microalbinuria      (+) renal disease, type: ARF and CRI,            Endo: Comment: hyperparathyroidism    (+)  type II  DM, Last HgA1c: 9.9, date: 03/2024, Using insulin,          Obesity,    Type I DM: 9.9.   Psychiatric/Substance Use:     (+) psychiatric history depression and anxiety  H/O chronic opiod use .     Infectious Disease: Comment: Sepsis    (+)   MRSA (09/23/23),         Malignancy:       Other: Comment: Lymphedema     (+)  , H/O Chronic Pain (fibromyalgia),         Physical Exam    Airway  airway exam normal      Mallampati: II   TM distance: > 3 FB   Neck ROM: full   Mouth opening: > 3 cm    Respiratory Devices and Support         Dental       (+) Multiple visibly decayed, broken teeth      Cardiovascular   cardiovascular exam normal          Pulmonary   pulmonary exam normal        breath sounds clear to auscultation       OUTSIDE LABS:  CBC:   Lab Results   Component Value Date    WBC 8.8 07/13/2024    WBC 10.6 07/12/2024    HGB 9.0 (L) 07/13/2024    HGB 9.1 (L) 07/12/2024    HCT 27.7 (L) 07/13/2024    HCT 27.2 (L) 07/12/2024     07/13/2024     07/12/2024     BMP:   Lab Results   Component Value Date     07/13/2024     07/12/2024    POTASSIUM 3.9 07/13/2024    POTASSIUM 2.9 (L) 07/13/2024    CHLORIDE 99 07/13/2024    CHLORIDE 96 (L) 07/12/2024    CO2 32 (H) 07/13/2024    CO2 30 (H) 07/12/2024    BUN 33.7 (H) 07/13/2024    BUN 51.7 (H) 07/12/2024    CR 1.14 (H) 07/13/2024    CR 1.45 (H) 07/12/2024     (H) 07/13/2024     (H) 07/13/2024     COAGS:   Lab Results   Component Value Date    PTT 23 06/16/2006    INR 3.30 (H) 07/13/2024     POC:   Lab Results   Component Value Date     (H) 04/27/2006     HEPATIC:   Lab Results   Component Value Date    ALBUMIN 2.9 (L) 07/11/2024    PROTTOTAL 8.3 07/11/2024    ALT 21 07/11/2024    AST 56 (H) 07/11/2024    ALKPHOS 101 07/11/2024    BILITOTAL 0.7 07/11/2024     OTHER:   Lab Results   Component Value Date    LACT 1.4 07/11/2024    A1C 9.9 (H) 03/01/2024    ZAKIA 9.0 07/13/2024    PHOS 3.2 10/21/2023    MAG 2.2 07/12/2024    TSH 3.46  11/24/2023    T4 1.20 11/24/2023    CRP 16.0 (H) 03/28/2008    SED 32 (H) 03/28/2008       Anesthesia Plan    ASA Status:  3    NPO Status:  NPO Appropriate    Anesthesia Type: General.     - Airway: Native airway   Induction: Intravenous.   Maintenance: TIVA.        Consents    Anesthesia Plan(s) and associated risks, benefits, and realistic alternatives discussed. Questions answered and patient/representative(s) expressed understanding.     - Discussed: Risks, Benefits and Alternatives for BOTH SEDATION and the PROCEDURE were discussed     - Discussed with:  Patient            Postoperative Care       PONV prophylaxis: Ondansetron (or other 5HT-3)     Comments:             MOE Shaw CRNA    I have reviewed the pertinent notes and labs in the chart from the past 30 days and (re)examined the patient.  Any updates or changes from those notes are reflected in this note.

## 2024-07-14 NOTE — PLAN OF CARE
Goal Outcome Evaluation:      Plan of Care Reviewed With: patient    Overall Patient Progress: improvingOverall Patient Progress: improving  Patient back from surgery, was c/o of pain agreed to take Tylenol. Patient currently sleeping and declined moving at this time.

## 2024-07-14 NOTE — PLAN OF CARE
Problem: Pain Acute  Goal: Optimal Pain Control and Function  Outcome: Not Progressing     Problem: Skin or Soft Tissue Infection  Goal: Absence of Infection Signs and Symptoms  Outcome: Not Progressing     Problem: Infection  Goal: Absence of Infection Signs and Symptoms  Outcome: Not Progressing     Problem: Fall Injury Risk  Goal: Absence of Fall and Fall-Related Injury  Outcome: Progressing     Patient has been alert and oriented x4. Refusing to reposition in bed. Anxious this evening prior to MRI. Patient was given PRN Valium which was effective.  Goal for INR to be <1.6, rechecking at midnight.

## 2024-07-14 NOTE — ANESTHESIA CARE TRANSFER NOTE
Patient: Linda Loredo    Procedure: Procedure(s):  IRRIGATION AND DEBRIDEMENT, LOWER EXTREMITY, RIGHT LOWER LEG       Diagnosis: Cellulitis of right lower extremity [L03.115]  Diagnosis Additional Information: No value filed.    Anesthesia Type:   General     Note:    Oropharynx: spontaneously breathing  Level of Consciousness: drowsy  Oxygen Supplementation: face mask  Level of Supplemental Oxygen (L/min / FiO2): 6  Independent Airway: airway patency satisfactory and stable  Dentition: dentition unchanged  Vital Signs Stable: post-procedure vital signs reviewed and stable  Report to RN Given: handoff report given  Patient transferred to: PACU    Handoff Report: Identifed the Patient, Identified the Reponsible Provider, Reviewed the pertinent medical history, Discussed the surgical course, Reviewed Intra-OP anesthesia mangement and issues during anesthesia, Set expectations for post-procedure period and Allowed opportunity for questions and acknowledgement of understanding  Vitals:  Vitals Value Taken Time   BP     Temp     Pulse     Resp     SpO2         Electronically Signed By: MOE Loera CRNA  July 14, 2024  11:29 AM

## 2024-07-14 NOTE — PROGRESS NOTES
Brief General Surgery   7/14/2024  11:32 AM    Patient now s/p debridement     Wound care: defer to wound care nurses for treatment     Patient's right lower extremity is covered with xeroform, gauze, abd, wrapped with cast padding and covered with 6 inch ace wrap.     Do not restart patient's warfarin until 7/15/2024    If patient dressing is saturated, remove dressing, replace dressing with xeroform, gauze, abd and kerlix rolls 6 inch ace to create pressure dressing

## 2024-07-14 NOTE — PLAN OF CARE
Problem: Risk for Delirium  Goal: Improved Sleep  Outcome: Progressing     Problem: Pain Acute  Goal: Optimal Pain Control and Function  Outcome: Progressing     Problem: Fall Injury Risk  Goal: Absence of Fall and Fall-Related Injury  Outcome: Progressing   Goal Outcome Evaluation:  A&O x4  Pt was able to sleep during the night. Bed change was required for incontinence care. Had some complaints of pain but declined any pain medication.   INR recheck at midnight was 1.59

## 2024-07-14 NOTE — OP NOTE
[facility]  Operative Note    Pre-operative diagnosis: Cellulitis of right lower extremity [L03.115]   Post-operative diagnosis Cellulitis, right lower extremity ulcer with ischemia   Procedure: Procedure(s):  Excisional debridement of right lower extremity [id number]   Surgeon: Phuong Gutierrez MD   Assistants(s): Cammie Fonseca PA-C  assist was needed for positioning/prepping, visualization and bleeding management by retraction, suctioning, and suturing/closure.    Anesthesia: Choice    Estimated blood loss: Minimal                Drains: None     Specimens       * No specimens in log *   Implants: none   Findings: Large area of eschar; there was good bleeding below the ischemic dermis and well into the fatty tissue.  .   Complications: None.   Condition: Stable       Indications for the procedure:   This is a 79-year-old female with a very complex past medical history and peripheral arterial disease struggling with multiple wounds.  There is an area on the right lower leg that needs debridement as there is a large area of eschar and nonhealing wound.  Risks, benefits, alternatives were explained to the patient she showed understanding wish to proceed.     Description of procedure:  Patient was properly identified in the preop holding area.  Patient was then brought back to the operative suite.  Placed in a supine position.  Anesthesia was induced per anesthesia protocol.  We are able to elevate the right lower extremity up to see the posterior aspect of the lower leg.  Area was then prepped and draped in usual sterile fashion.  A timeout was then done to confirm the correct patient positioning and procedure.  We started our procedure by utilizing a 10 blade scalpel to completely excise the large ischemic eschar around the calf muscles.  There was granulation tissue that was ischemic appearing and I completely excised this sharply with a #10 blade scalpel I excised all around this area of  concern until we have healthy bleeding.  We held pressure for several minutes and noted that it did slow down the oozing.  Area was explored and there is no additional area of ischemia or need excisional debridement.  Last area was measured as 18 x 11 x 0.5cm.  The entire dermis was completely excised sharply along the with the ischemic subcutaneous fat.  Area was then dressed with gauze Xeroform gauze, 4 x 4 gauze, ABD and wrapped with Kerlix.  The entire leg was lightly wrapped with Ace wrap.  Patient is to resume wound care.  Coumadin is to resume tomorrow unless there is significant amount of bleeding.  Patient can resume her normal diet.  All counts were correct x 2.      Remi-Jennifer Gutierrez, DO, FACOS    Disclaimer: This note consists of words and symbols derived from keyboarding and dictation using voice recognition software.  As a result, there may be errors that have gone undetected.  Please consider this when interpreting information found in this note.

## 2024-07-15 ENCOUNTER — APPOINTMENT (OUTPATIENT)
Dept: PHYSICAL THERAPY | Facility: CLINIC | Age: 79
DRG: 853 | End: 2024-07-15
Payer: COMMERCIAL

## 2024-07-15 ENCOUNTER — APPOINTMENT (OUTPATIENT)
Dept: OCCUPATIONAL THERAPY | Facility: CLINIC | Age: 79
DRG: 853 | End: 2024-07-15
Payer: COMMERCIAL

## 2024-07-15 LAB
ANION GAP SERPL CALCULATED.3IONS-SCNC: 7 MMOL/L (ref 7–15)
BUN SERPL-MCNC: 23.3 MG/DL (ref 8–23)
CALCIUM SERPL-MCNC: 8.7 MG/DL (ref 8.8–10.2)
CHLORIDE SERPL-SCNC: 98 MMOL/L (ref 98–107)
CREAT SERPL-MCNC: 0.99 MG/DL (ref 0.51–0.95)
DEPRECATED HCO3 PLAS-SCNC: 28 MMOL/L (ref 22–29)
EGFRCR SERPLBLD CKD-EPI 2021: 58 ML/MIN/1.73M2
ERYTHROCYTE [DISTWIDTH] IN BLOOD BY AUTOMATED COUNT: 16.1 % (ref 10–15)
GLUCOSE BLDC GLUCOMTR-MCNC: 186 MG/DL (ref 70–99)
GLUCOSE BLDC GLUCOMTR-MCNC: 222 MG/DL (ref 70–99)
GLUCOSE BLDC GLUCOMTR-MCNC: 228 MG/DL (ref 70–99)
GLUCOSE BLDC GLUCOMTR-MCNC: 240 MG/DL (ref 70–99)
GLUCOSE SERPL-MCNC: 268 MG/DL (ref 70–99)
HCT VFR BLD AUTO: 27.4 % (ref 35–47)
HGB BLD-MCNC: 8.8 G/DL (ref 11.7–15.7)
INR PPP: 1.42 (ref 0.85–1.15)
MCH RBC QN AUTO: 29.9 PG (ref 26.5–33)
MCHC RBC AUTO-ENTMCNC: 32.1 G/DL (ref 31.5–36.5)
MCV RBC AUTO: 93 FL (ref 78–100)
PLATELET # BLD AUTO: 190 10E3/UL (ref 150–450)
POTASSIUM SERPL-SCNC: 3.6 MMOL/L (ref 3.4–5.3)
RBC # BLD AUTO: 2.94 10E6/UL (ref 3.8–5.2)
SODIUM SERPL-SCNC: 133 MMOL/L (ref 135–145)
WBC # BLD AUTO: 8.1 10E3/UL (ref 4–11)

## 2024-07-15 PROCEDURE — 99232 SBSQ HOSP IP/OBS MODERATE 35: CPT | Performed by: INTERNAL MEDICINE

## 2024-07-15 PROCEDURE — 250N000013 HC RX MED GY IP 250 OP 250 PS 637: Performed by: INTERNAL MEDICINE

## 2024-07-15 PROCEDURE — 97530 THERAPEUTIC ACTIVITIES: CPT | Mod: GP

## 2024-07-15 PROCEDURE — 85610 PROTHROMBIN TIME: CPT | Performed by: INTERNAL MEDICINE

## 2024-07-15 PROCEDURE — G0463 HOSPITAL OUTPT CLINIC VISIT: HCPCS

## 2024-07-15 PROCEDURE — 80048 BASIC METABOLIC PNL TOTAL CA: CPT | Performed by: INTERNAL MEDICINE

## 2024-07-15 PROCEDURE — 120N000001 HC R&B MED SURG/OB

## 2024-07-15 PROCEDURE — 250N000011 HC RX IP 250 OP 636: Performed by: INTERNAL MEDICINE

## 2024-07-15 PROCEDURE — 85027 COMPLETE CBC AUTOMATED: CPT | Performed by: INTERNAL MEDICINE

## 2024-07-15 PROCEDURE — 36415 COLL VENOUS BLD VENIPUNCTURE: CPT | Performed by: INTERNAL MEDICINE

## 2024-07-15 PROCEDURE — 97161 PT EVAL LOW COMPLEX 20 MIN: CPT | Mod: GP

## 2024-07-15 PROCEDURE — 97535 SELF CARE MNGMENT TRAINING: CPT | Mod: GO

## 2024-07-15 PROCEDURE — 97165 OT EVAL LOW COMPLEX 30 MIN: CPT | Mod: GO

## 2024-07-15 RX ORDER — HYDROMORPHONE HYDROCHLORIDE 1 MG/ML
0.5 INJECTION, SOLUTION INTRAMUSCULAR; INTRAVENOUS; SUBCUTANEOUS DAILY PRN
Status: DISCONTINUED | OUTPATIENT
Start: 2024-07-15 | End: 2024-07-16 | Stop reason: HOSPADM

## 2024-07-15 RX ORDER — WARFARIN SODIUM 3 MG/1
3 TABLET ORAL
Status: COMPLETED | OUTPATIENT
Start: 2024-07-15 | End: 2024-07-15

## 2024-07-15 RX ADMIN — MICONAZOLE NITRATE: 20 POWDER TOPICAL at 21:11

## 2024-07-15 RX ADMIN — PIPERACILLIN AND TAZOBACTAM 3.38 G: 3; .375 INJECTION, POWDER, FOR SOLUTION INTRAVENOUS at 04:51

## 2024-07-15 RX ADMIN — PIPERACILLIN AND TAZOBACTAM 3.38 G: 3; .375 INJECTION, POWDER, FOR SOLUTION INTRAVENOUS at 12:33

## 2024-07-15 RX ADMIN — WARFARIN SODIUM 3 MG: 3 TABLET ORAL at 17:35

## 2024-07-15 RX ADMIN — MICONAZOLE NITRATE: 20 POWDER TOPICAL at 08:30

## 2024-07-15 RX ADMIN — ACETAMINOPHEN 1000 MG: 500 TABLET, FILM COATED ORAL at 08:25

## 2024-07-15 RX ADMIN — THERA TABS 1 TABLET: TAB at 08:25

## 2024-07-15 RX ADMIN — INSULIN DEGLUDEC 34 UNITS: 100 INJECTION, SOLUTION SUBCUTANEOUS at 08:26

## 2024-07-15 RX ADMIN — METHADONE HYDROCHLORIDE 5 MG: 5 TABLET ORAL at 12:33

## 2024-07-15 RX ADMIN — HYDROMORPHONE HYDROCHLORIDE 0.5 MG: 1 INJECTION, SOLUTION INTRAMUSCULAR; INTRAVENOUS; SUBCUTANEOUS at 15:10

## 2024-07-15 RX ADMIN — PIPERACILLIN AND TAZOBACTAM 3.38 G: 3; .375 INJECTION, POWDER, FOR SOLUTION INTRAVENOUS at 23:03

## 2024-07-15 RX ADMIN — POTASSIUM CHLORIDE 20 MEQ: 1500 TABLET, EXTENDED RELEASE ORAL at 08:26

## 2024-07-15 RX ADMIN — METHADONE HYDROCHLORIDE 5 MG: 5 TABLET ORAL at 21:11

## 2024-07-15 RX ADMIN — ATORVASTATIN CALCIUM 40 MG: 20 TABLET, FILM COATED ORAL at 21:11

## 2024-07-15 RX ADMIN — PIPERACILLIN AND TAZOBACTAM 3.38 G: 3; .375 INJECTION, POWDER, FOR SOLUTION INTRAVENOUS at 00:17

## 2024-07-15 RX ADMIN — PIPERACILLIN AND TAZOBACTAM 3.38 G: 3; .375 INJECTION, POWDER, FOR SOLUTION INTRAVENOUS at 17:35

## 2024-07-15 RX ADMIN — METHADONE HYDROCHLORIDE 5 MG: 5 TABLET ORAL at 08:25

## 2024-07-15 RX ADMIN — METHADONE HYDROCHLORIDE 5 MG: 5 TABLET ORAL at 17:35

## 2024-07-15 ASSESSMENT — ACTIVITIES OF DAILY LIVING (ADL)
ADLS_ACUITY_SCORE: 45
ADLS_ACUITY_SCORE: 45
ADLS_ACUITY_SCORE: 55
ADLS_ACUITY_SCORE: 55
ADLS_ACUITY_SCORE: 45
ADLS_ACUITY_SCORE: 45
ADLS_ACUITY_SCORE: 55
ADLS_ACUITY_SCORE: 43
ADLS_ACUITY_SCORE: 55
ADLS_ACUITY_SCORE: 45
ADLS_ACUITY_SCORE: 55
PREVIOUS_RESPONSIBILITIES: LAUNDRY;FINANCES
ADLS_ACUITY_SCORE: 55

## 2024-07-15 NOTE — PROGRESS NOTES
[facility] HEALTH SERVICES  SPIRITUAL ASSESSMENT Progress Note  [facility] - Med Surg    Referral Source: Patient request on admission    I shared a reflective conversation with Margaret which incorporated elements of illness and family narrative along with spiritual history.    - Margaret welcomed visit and right away showed me her rosary.  She mentioned how important prayer is for her and how that legacy goes way back to her parents and grand parents.  She said that prayer brings her peace and connects her to God. She has many friends who are part of her prayer support team.      - She also spoke of her daughter and how she and her family are on a family vacation, checking in regularly with her.  Margaret said that when she and her   she kept both her maiden and  names, hyphenated, so that her daughter would feel connected to both of her parents.      - Margaret welcomed prayer together.  I affirmed that  is available for support during her stay and that we are on-site daily.  She expressed appreciation for the visit and knowing that support was available.    Plan:  will monitor patient's ongoing need for support during LOS.    Jimmy Kelly M.A., [facility]  Staff Chaplain [facility]  Office: [phone]  Pager [phone]

## 2024-07-15 NOTE — DISCHARGE INSTRUCTIONS
Sacral/Coccyx/Right Thigh wounds:  1.) Cleanse with Vashe on gauze soak for 5 minutes. Pat dry.  2.) Apply skin barrier (no-sting wipes,) to surrounding skin where adhesive will go and allow to dry  3.) Cover with Mepilex foam bandages sized appropriately to fit area, then wipe over edges with skin barrier  Do not remove for routine skin assessments due to pain  Change every other day and as needed.      Pressure Injury Prevention (PIP) Plan:  Mattress: low air loss mattress.  HOB: Maintain at or below 30 degrees, needs regular reminders for this  Repositioning in bed: Every 1-2 hours , Left/right positioning; avoid supine, and Raise foot of bed prior to raising head of bed, to reduce patient sliding down (shear)  Heels: Keep elevated off mattress; heel boot to right  Chair positioning: Work with wheelchair provider and PT/OT on appropriate seating due to buttock pressure injury  Assist patient to reposition hourly if able (lean to one side for several minutes, tilt chair if it has this capability)  Moisture Management: Use Marisol cleanse and protect lotion to clean sreekanth-area with perineal cares, avoid brief in bed, use suction external catheter in bed if possible          Right lower leg wound(s): Check daily initially, if drainage allows change every 2-3 days but if cover bandage is saturated or leaking the change daily. Soak bandages with saline as needed to remove. Pre-medicate for pain prior to bandage change.   1.) Cleanse periwound/leg with Wander wipes.   2.) Apply Vashe soaked gauze to all open wounds for 5 minutes. Then gently wipe with gauze and pat dry.   3.) Apply Aquaphor to any dry, flaky areas   4.) Apply xeroform to anterior lower leg wounds and foot wounds if open (if dry leave dry, cover with dry gauze)   5.) Apply aquacel Ag to posterior leg wound (cut to fit wound)  6.) Cover posterior leg wound with Mextra Super absorbant pad and abd pad over anterior lower leg  7.) Secure with rolled  gauze    Elevate lower leg on 2 pillows with heel floating as much as able     Need to follow-up with vascular for leg as soon as possible. Will likely need a compression therapy plan, deferring this until vascular consult can happen. Work on regular leg elevation as much as able.    Warfarin Discharge Instructions:  Continue home dose of warfarin 3 mg daily.  Recheck INR within 1 week at [facility] TCU.

## 2024-07-15 NOTE — PROGRESS NOTES
"   07/15/24 1400   Appointment Info   Signing Clinician's Name / Credentials (OT) Janiya Salomon OTR/L   Living Environment   People in Home alone   Current Living Arrangements assisted living   Home Accessibility no concerns   Living Environment Comments Pt lives in [facility] , w/c bound at time since BKA L LE in Oct of 2023 and stating sleeping  in w/c at times. Pt was ind with BADLs from manual w/c level   Self-Care   Equipment Currently Used at Home grab bar, toilet;glucometer;raised toilet seat;lift device;wheelchair, manual   Fall history within last six months no   Activity/Exercise/Self-Care Comment Pt stating likes to be very ind declining assist of TH often. Pt stating does own laundry from w/c   Instrumental Activities of Daily Living (IADL)   Previous Responsibilities laundry;finances   General Information   Onset of Illness/Injury or Date of Surgery 07/10/24   Referring Physician Dr Jimmy Barfield   Patient/Family Therapy Goal Statement (OT) states she will get to decide if she is willing to go to TCU   Additional Occupational Profile Info/Pertinent History of Current Problem 79 y.o. 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   Left Lower Extremity (Weight-bearing Status)   (L LE BKA)   Cognitive Status Examination   Orientation Status orientation to person, place and time   Behavioral Issues inappropriate language;difficulty managing stress   Affect/Mental Status (Cognitive) agitated   Attention Deficit selective attention   Cognitive Status Comments Pt appearing tangental and frustrated t/o tx not wanting help from TH then stating\" If you dont want to help me get the hell out\"   Pain Assessment   Patient Currently in Pain Yes, see Vital Sign flowsheet   Range of Motion Comprehensive   General Range of Motion bilateral upper extremity ROM WNL   Coordination   Upper Extremity Coordination No deficits were identified   Bed Mobility   Bed Mobility supine-sit "   Supine-Sit Sargent (Bed Mobility) supervision   Transfers   Transfer Comments pt declined attempting to bear wt on R LE   Clinical Impression   Criteria for Skilled Therapeutic Interventions Met (OT) Yes, treatment indicated   OT Diagnosis decreased ability to perform BADLs   OT Problem List-Impairments impacting ADL problems related to;activity tolerance impaired;balance;mobility;pain   Assessment of Occupational Performance 1-3 Performance Deficits   Identified Performance Deficits LB dressing, transfers needing lift, toileting   Planned Therapy Interventions (OT) ADL retraining;transfer training;progressive activity/exercise   Clinical Decision Making Complexity (OT) problem focused assessment/low complexity   Risk & Benefits of therapy have been explained evaluation/treatment results reviewed;care plan/treatment goals reviewed;risks/benefits reviewed;current/potential barriers reviewed;participants voiced agreement with care plan;participants included;patient   Clinical Impression Comments Pt educated on importance of OT/PT in agreement but not in agreement of reasoning to help decide POC . Pt would benefit from skilled OT for increased overall strength and act kushal for ind and safety with BADLs and transfers   OT Total Evaluation Time   OT Eval, Low Complexity Minutes (54572) 10   OT Goals   Therapy Frequency (OT) 5 times/week   OT Predicted Duration/Target Date for Goal Attainment 07/22/24   OT Goals Lower Body Dressing;OT Goal 1;OT Goal 2;Hygiene/Grooming   OT: Hygiene/Grooming supervision/stand-by assist  (kushal 5 min sitting EOB)   OT: Lower Body Dressing Moderate assist   OT: Goal 1 Pt will require max A 2 to stand at EOB.   OT: Goal 2 Pt will perform UE HEP prior to d/c for increased overall strength for sit to stand and transfers   Interventions   Interventions Quick Adds Self-Care/Home Management;Therapeutic Activity;Therapeutic Procedures/Exercise   Self-Care/Home Management   Self-Care/Home  "Mgmt/ADL, Compensatory, Meal Prep Minutes (20174) 15   Symptoms Noted During/After Treatment (Meal Preparation/Planning Training) increased pain   Treatment Detail/Skilled Intervention Pt fluctuated between wanting to work with TH and stating \" then get the hell out\" Pt stating wants to be independent and telling TH \" dont move that table I move it . I want to be ind.\" pt sat EOB with SBA using bed rail with HOB up. Pt only kushal approx 2-3 min sitting EOB stating buttock was painful needing to lay down. Pt declined attempting to stand with TH at this time. Pt seen for co-tx with PT due to not having stood up for many days   OT Discharge Planning   OT Plan POC 1/5 Mon LB dressing, co tx with PT for sit to stand, UE HEP, g/h seated EOB   OT Discharge Recommendation (DC Rec) Transitional Care Facility;home with home care occupational therapy   OT Rationale for DC Rec recommend TCU at this time for increased safety and ind with BADLs and transfers with pt below baseline   OT Brief overview of current status SBA bed mobility, pt declined standing   Total Session Time   Timed Code Treatment Minutes 15   Total Session Time (sum of timed and untimed services) 25       "

## 2024-07-15 NOTE — PLAN OF CARE
Problem: Infection  Goal: Absence of Infection Signs and Symptoms  Intervention: Prevent or Manage Infection  Recent Flowsheet Documentation  Taken 7/15/2024 0910 by Suzanna Cole RN  Isolation Precautions: contact precautions maintained     Problem: Pain Acute  Goal: Optimal Pain Control and Function  7/15/2024 1608 by Suzanna Cole RN  Outcome: Progressing     Problem: Skin or Soft Tissue Infection  Goal: Absence of Infection Signs and Symptoms  7/15/2024 1608 by Suzanna Cole RN  Outcome: Progressing  7/15/2024 1431 by Suzanna Cole RN  Outcome: Not Progressing  Intervention: Minimize and Manage Infection Progression  Recent Flowsheet Documentation  Taken 7/15/2024 0910 by Suzanna Cole RN  Infection Prevention: hand hygiene promoted  Isolation Precautions: contact precautions maintained   Goal Outcome Evaluation:      Plan of Care Reviewed With: patient    Overall Patient Progress: improvingOverall Patient Progress: improving        Alert and oriented patient.   IV antibiotics given. Methadone scheduled for pain and prn Tylenol given.   Obtained order for Dilaudid IV, given prior to 1515 wound care performed by [facility] RN.   Able to let needs be known, can be uncooperative at times.  Refused out of bed to chair for shift. Did sit at edge of bed with therapy after much encouragement. Carb consistent diet order. Patient eating 75% of meals. Asking for ice cream throughout shift. Offered suger free dessert alternates and educated on diabetic diet and blood sugar levels. Patient verbalized understanding, however still insistent on ice cream for snack.

## 2024-07-15 NOTE — PROGRESS NOTES
Care Management Follow Up    Length of Stay (days): 4    Expected Discharge Date: 7/16/24     Concerns to be Addressed: discharge planning     Patient plan of care discussed at interdisciplinary rounds: Yes    Anticipated Discharge Disposition: Transitional Care     Anticipated Discharge Services: Transportation Services  Anticipated Discharge DME: None    Patient/family educated on Medicare website which has current facility and service quality ratings: Yes  Education Provided on the Discharge Plan: Yes  Patient/Family in Agreement with the Plan: yes    Referrals Placed by CM/SW: External Care Coordination, Transportation  Private pay costs discussed: transportation costs    Additional Information:  Per IDT rounds today, MD team states that pt IS NOT medically stable for discharge today.  MD anticipates pt to remain hospitalized another 1-2 days.     PT/OT recommends TCU vs LTC upon discharge as pt did not participate with them very much today.  YUDY met with pt at bedside, pt called her daughter, Lu, via her cellphone and the three of us discussed discharge planning.  After a lengthy discussion, tears by the pt, she is in agreement with this plan of care.      YUDY has provided education regarding Medicare.gov; how pt's insurance will cover services upon discharge, Medicare's homebound status, and offered choice of agency for cares.  Per discussion with pt/family, they requested referrals be sent to:  Destination    Service Provider Request Status Selected Services Address Phone Fax Patient Preferred   [facility] REFERRAL  Pending - Request Sent N/A [address] [facility] [id number] [phone] --      Internal Comment last updated by Stephanie Levine LSW 7/15/2024  4:06 PM- [facility] Pending - Request Sent N/A [id number] [place] [id number] [phone]    [facility]  Pending - Request Sent  N/A [address] [facility] [id number] [phone] --   [facility] REFERRAL ONLY Pending - Request Sent N/A [id number] [facility] [id number] [phone] --   Current Capacity last updated by Sary Barger MA on 2/20/2024  7:46 AM    Admissions [phone]            [facility] Pending - Request Sent N/A [address] [facility] [id number] [phone] --      Transportation discussed with pt/family.  [facility] wheelchair vs stretcher will be used, depending upon pt's ability to pivot at time of discharge.  Pt and family was informed on cost of transportation services for out of pocket costs.  PCS form, if needed, will be completed by YUDY.        Care Management team to follow for discharge plans.        LIZY Rowley

## 2024-07-15 NOTE — PLAN OF CARE
Problem: Pain Acute  Goal: Optimal Pain Control and Function  Outcome: Progressing  Intervention: Prevent or Manage Pain  Recent Flowsheet Documentation  Taken 7/15/2024 0100 by Devick, Karen M, RN  Medication Review/Management: medications reviewed  Intervention: Optimize Psychosocial Wellbeing  Recent Flowsheet Documentation  Taken 7/15/2024 0000 by Devick, Karen M, RN  Supportive Measures: active listening utilized     Problem: Fall Injury Risk  Goal: Absence of Fall and Fall-Related Injury  Outcome: Progressing  Intervention: Identify and Manage Contributors  Recent Flowsheet Documentation  Taken 7/15/2024 0100 by Devick, Karen M, RN  Medication Review/Management: medications reviewed  Intervention: Promote Injury-Free Environment  Recent Flowsheet Documentation  Taken 7/15/2024 0100 by Devick, Karen M, RN  Safety Promotion/Fall Prevention: (ceiling lift)   activity supervised   clutter free environment maintained   supervised activity   safety round/check completed   room organization consistent   room near nurse's station   room door open   other (see comments)     Problem: Infection  Goal: Absence of Infection Signs and Symptoms  Outcome: Progressing  Intervention: Prevent or Manage Infection  Recent Flowsheet Documentation  Taken 7/15/2024 0100 by Devick, Karen M, RN  Isolation Precautions: contact precautions maintained     Goal Outcome Evaluation:  Patient alert and oriented. On Contact Precautions for MRSA. Saline locked. On IV antibiotics. Mepilex on sacrum. Right Lower Extremity wound Ace Wrapped after Debridement yesterday. Takes pills one at a time. Denies pain. Chairfast, use ceiling lift. Patients wheelchair in room. On K+ protocol. K+ is 3.6 this AM, recheck in AM, order submitted.

## 2024-07-15 NOTE — PROGRESS NOTES
"   07/15/24 1316   Appointment Info   Signing Clinician's Name / Credentials (PT) Henrietta Guzman, PT   Living Environment   People in Home alone   Current Living Arrangements assisted living   Home Accessibility no concerns   Living Environment Comments Lives at [facility] bound since L BKA in Oct 2023. At baseline mod I with ADLs from manual WC   Self-Care   Equipment Currently Used at Home grab bar, toilet;grab bar, tub/shower;glucometer;lift device;raised toilet seat;shower chair;wheelchair, manual   Fall history within last six months no   Activity/Exercise/Self-Care Comment indep with squat pivot transfer to/from wheelchair, pt frustrated with fit of wheelchair. pt stating completes her own laundry from wheelchair at 2AM.   General Information   Onset of Illness/Injury or Date of Surgery 07/10/24   Referring Physician Jimmy Barfield MD   Patient/Family Therapy Goals Statement (PT) return home, pt stating she's \"fully independent\"   Pertinent History of Current Problem (include personal factors and/or comorbidities that impact the POC) 79 y.o. 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   Existing Precautions/Restrictions fall   Cognition   Cognitive Status Comments tangential with conversation, appearing very frustrated with PT/OT seeing pt for discharge recs   Pain Assessment   Patient Currently in Pain Yes, see Vital Sign flowsheet  (RLE, always 6/10)   Integumentary/Edema   Integumentary/Edema Comments RLE wrapped after WOC appt this AM, L BKA   Range of Motion (ROM)   Range of Motion ROM is WFL   Strength (Manual Muscle Testing)   Strength (Manual Muscle Testing) Deficits observed during functional mobility   Bed Mobility   Comment, (Bed Mobility) supine>sit with Gulshan and use of bedrail, needs increased time to complete due to pain, pt refusing assist throughout completion of transfer   Transfers   Comment, (Transfers) declines completing due to pain in RLE, states " "she only squat pivots to wheelchair \"with my arms, I don't need my leg to transfer\"   Gait/Stairs (Locomotion)   Comment, (Gait/Stairs) does not walk, states she has prosthetic for LLE but has \"worn 5 times since I got it\", asked when pt received prosthetic however no clear answer given   Clinical Impression   Criteria for Skilled Therapeutic Intervention Yes, treatment indicated   PT Diagnosis (PT) impaired functional mobility   Influenced by the following impairments L BKA 10/2023, pain, weakness   Functional limitations due to impairments impaired bed mobility, transfers   Clinical Presentation (PT Evaluation Complexity) evolving   Clinical Presentation Rationale clinical reasoning   Clinical Decision Making (Complexity) low complexity   Planned Therapy Interventions (PT) balance training;bed mobility training;patient/family education;strengthening;transfer training;progressive activity/exercise;risk factor education;home program guidelines   Risk & Benefits of therapy have been explained evaluation/treatment results reviewed;care plan/treatment goals reviewed;risks/benefits reviewed;current/potential barriers reviewed;participants voiced agreement with care plan;participants included;patient   Clinical Impression Comments Pt initially agreeable to PT/OT with educaiton on role and purpose of PT session. Pt adamately declining any kind of assistance  for mobility or cues throughout session as she \"wants to keep my independence\", encouraged pt to complete as much as she is able to do within her limitations of pain, strength, etc. Pt unable to progress past sitting EOB due to RLE pain, able to lift BLE into bed, declines assistance when offered to help reposition due to her pain, pt then stating \"if you're not going to help, then get the hell out\". discussed PT/OT just offered to assist pt reposition but pt declined, pt then stating \"I can do it all myself and don't need help\". Pt then using negative explitives " "toward PT/OT, asked if pt wanted continued therapy during hospitalization due to this negative feeling, pt stating she would like continued therapy and then pleasantly thanked PT/OT for assisting her.   PT Total Evaluation Time   PT Mayra, Low Complexity Minutes (31522) 20   Physical Therapy Goals   PT Frequency 5x/week   PT Predicted Duration/Target Date for Goal Attainment 07/22/24   PT Goals Bed Mobility;Transfers   PT: Bed Mobility Modified independent;Supine to/from sit   PT: Transfers Moderate assist;Bed to/from chair;Within precautions   Interventions   Interventions Quick Adds Therapeutic Activity   Therapeutic Activity   Therapeutic Activities: dynamic activities to improve functional performance Minutes (42846) 12   Symptoms Noted During/After Treatment Increased pain   Treatment Detail/Skilled Intervention edu on role of PT to assess mobility and assist in safe discharge plan, pt initially agreeable with edu on purpose of MD order to assess mobility however also stating \"tough shit\" in re: to edu on purpose of session. seen with OT as pt stating has not been out of bed since Friday. edu on importance of mobility to increase healing. pt inconsistent in her goals throughout session, goes from being very rude toward PT/OT and telling to \"get the hell out\" to pleasantly thanking us at end of session and agreeable to therapy tomorrow. pt remains in bed, declines assist for repositioning and stating she will call if needing assist, bed alarm on.   PT Discharge Planning   PT Plan Mon 1/5; see as cotreat with OT, progress bed mobility and transfers as pt allows, LE strengthening   PT Discharge Recommendation (DC Rec) Transitional Care Facility;Long term care facility   PT Rationale for DC Rec pt typically indep with pivot transfers in/out of wheelchair, ADL's, and some IADL's. per chart review, pt has had progressive decline due to weakness, L BKA in Oct 2023, and now RLE cellulitis, unable to return to senior care if not " able to complete pivot transfer, currently not able to complete so rec TCU, pt resistive to assist as wanting to remain as indep as possible   PT Brief overview of current status supine<>sit with Gulshan   Total Session Time   Timed Code Treatment Minutes 12   Total Session Time (sum of timed and untimed services) 32

## 2024-07-15 NOTE — CONSULTS
"[facility] Nurse Inpatient Assessment     Consulted for: left LE post-op    Summary: All cares taking considerable amount of time due to pt's pain and asking staff to stop, this is even despite IV dilaudid for leg wound cares. Pt frequently upset with multiple staff, making comments like \"you don't care\" and calling staff names.     Buttocks improving with present plan of care but pt often found sitting up at 90 degree angle in bed. Per pt she got a new wheelchair at her facility but ongoing modifications needs to be made.    Leg wound with copious drainage in need of absorptive bandages to decrease frequency of care, non-stick gelling Aquacel Ag recommended for improved pain control.     Likely needs plan for compression therapy but deferring this for now until she can be seen by vascular, CTA does show 3 vessel run off to foot but is concerning that she developed such a large area of eschar here while in compression. Would focus on elevation for now.     Patient History (according to provider note(s):    Linda Loredo is a 79 year old female presented to ED on 7/10/2024 with altered mental status.       Metabolic encephalopathy  Presume due to infection, right lower extremity cellulitis vs osteomyelitis vs urinary tract infection    7/11/2024   -Resolved, rapid improvement after admission     Right lower extremity cellulitis  Multiple wounds suspect mixed venous stasis and pressure, r/o ischemic component  Possible osteomyelitis of foot  Large ulcer posterior calf  Ulcers on buttock/coccyx, right lower extremity (large area on posterior calf suspect pressure/venous stasis, 2 medium sized areas right lateral mid-foot, 2 abterior shin ulcers medial and lateral). Right distal lateral mid-foot ulcer worrisome for osteomyelitis     Recently started on keflex 7/3 without improvement.  On admission WBC 11, afebrile, lactate 1.4.  Started on Zosyn in ED.  CRP 6/19/24 " 193.  Procalcitonin 0.25.  Xray negative for osteomyelitis    ABIs 7/12/2024 -  ALMA cannot be obtained due to noncompressible vessels. Monophasic waveforms on the right suggests arterial disease which could be further evaluated with duplex ultrasound     Right lower extremity arterial duplex with sluggish arterial flow with no focal stenosis.    MRI right foot shows no evidence of osteomyelitis, septic arthropathy, or abscess.  -Continue zosyn   -Blood cultures x 2 from 7/11 shows no growth x 3 days  -Patient underwent debridement in the OR on 7/14/2024, postoperative cares per general surgery  -CT angiogram right lower extremity without evidence for arterial stenoses  -Wound consult for wound care  -OT and PT consults recommending TCU     Urinary tract infection  Recently hospitalized 6/6-10/24 for sepsis secondary to cellulitis of right lower extremity with 1/1 blood cultures positive for pan-sensitive S. dysgalactiae. Repeat BCX 6/7 were negative. She also had a UTI and urine culture grew providencia rettgeri - resistant to zosyn/unasyn,  susceptible to rocephin. She was treated with zosyn and vanco, changed to ceftriaxone and discharged to a TCU with ceftriaxone for 2 weeks.  UA 7/11/2024:  >182 wbc. 24 rbc, large LE   -On zosyn, started 7/11  -UC from 7/11 shows less than 10K urogenital danny, but collected after zosyn given.     Chronic kidney disease 3a  Acute Kidney Injury, unspecified   Baseline creatinine 1.0- 1.1 (though 1.6 when last checked). Admission BUN/creatinine 84/2.0  Weight stable, down from discharge in 6/2024  -BUN/creatinine improved 25.7 / 0.99  -Hold diuretics  -Repeat BMP in a.m.     Atrial fibrillation, likely permanent   New onset during 5/2022 admission ...    Last EKG 1/2024 - afib - flutter. EKG 9/2023 atrial fibrillation     EKG 7/10/2024 - fib - flutter, rate controlled    Telemetry rate controlled atrial fibrillation/atrial flutter . Discontinued telemetry 7/12/2024   Vitamin  K 2 mg IV given at 1230  -INR 1.40 on 7/14/2024, prior to surgery  -Okay to resume Coumadin on 7/15 per general surgery     Anemia, normocytic, likely anemia of chronic disease/chronic kidney disease   Admission HGB 10.3. Baseline mostly 10-10.5.  No evidence acute blood loss    B12 1349. Ferritin 189. %Fe sat 30     HGB stable 9.0 - 9.1   -Follow      Type 2 Diabetes:   Last A1c 9.9 3/2024  -Retstart Tresiba 34 in am , Lantus 20 x1  -Medium insulin sliding scale      'Chronic Right Sided Heart failure'   Pulmonary hypertension by echo and mild to moderate pulmonary hypertension by angiogram 2006  Chronic lower extremities edema  Venous stasis      Echo 5/2022 [facility] - Left Ventricle Normal left ventricular chamber size. Normal left ventricular systolic function. Calculated left ventricular ejection  fraction (modified Pan technique) is 69 %. No regional wall motion abnormalities. Mildly increased left ventricular wall thickness. Indeterminate left ventricular diastolic function. Right ventricle was not well visualized. Estimated right ventricular systolic pressure is 34.3 mmHg plus right atrial pressure. Estimated right ventricular systolic pressure is 47.6 mmHg.      Normal right ventricular systolic function. Mild mitral valve regurgitation. Mild tricuspid valve regurgitation.   -Hold Zaroxolyn 2.5, and torsemide 20 given the GUSTAVO, KCL 20     Hypokalemia  Replace per protocol      Obstructive sleep apnea  Sleep study 2004 reportedly showed severe obstructive sleep apnea and recommend CPAP, Not available for review. Patient is untreated      Hypercalcemia   Primary hyperparathyroidism per problem list  Work up January 2013 Dr. Villareal, juan. Hypercalcemia with elevated PTH. Not subsequently addressed 2014  On admission corrected calcium is >10.1, will monitor as appropriate    -ionized calcium normal 5.0     S/p left below-knee amputation 10/2023     Fibromyalgia  Opioid dependence  -Continue with methadone  5     History of hypertension  Patient is currently not taking any medications for hypertension     Hyperlipidemia  -Continue atorvastatin 40     Obesity  Complicates cares and contributes to morbidity     Hypoalbuminemia  Albumin = 2.9 g/dL at 7/11/2024, will monitor as appropriate       Assessment:      Areas visualized during today's visit: Focused:    Wound location: Buttocks      Last photo: 7/15/24  Wound due to: Pressure Injury stage II  Wound history/plan of care: present on admission  Wound base: clean red/pale with red buds      Palpation of the wound bed: normal      Drainage: moderate     Description of drainage: serosanguinous     Measurements (length x width x depth, in cm): left side 1.4x0.8x0.1cm, right side medial 2.9x3.5x0.1cm, lateral 1.5x1.3x0.1cm  and third smaller one lateral to this not measured     Tunneling: N/A     Undermining: N/A  Periwound skin:  several scattered smaller areas      Color: pink      Temperature: normal   Odor: none  Pain:  reportedly extreme, even hollering out with no to minimal touch when staff near area  Pain interventions prior to dressing change: oral methadone and tylenol, does not like oxycodone  Treatment goal: Heal , Drainage control, Infection control/prevention, and Promote epidermal migration  STATUS: healing  Supplies ordered: supplies stored on unit    Wound location: right thigh    Last photo: 7/15/24  Wound due to: Pressure Injury, stage III  Wound history/plan of care: present on admission  Wound base: pale to pink      Palpation of the wound bed: normal      Drainage: moderate     Description of drainage: serosanguinous     Measurements (length x width x depth, in cm): 0.7x1.5x0.2cm       Tunneling: no     Undermining: no, some rolled skin edges  Periwound skin: Intact      Color: normal and consistent with surrounding tissue      Temperature: normal   Odor: none  Pain:  see above  Pain interventions prior to dressing change: see above  Treatment goal:  Heal , Infection control/prevention, and Increase granulation  STATUS: healing  Supplies ordered: supplies stored on unit    Wound location: Right lower leg    Posterior, anterior    Last photo: 7/15/24  Wound due to: Mixed Etiology: Mixed arterial/venous disease  Wound history/plan of care: posterior leg wound surgically debrided yesterday- xeroform on posterior wound sticking badly despite copious serous drainage  Wound base: primary red/clean full thickness with smaller pale area distal wound and several areas of slough- see photo. Anterior wounds area red to pale, no slough     Palpation of the wound bed: normal      Drainage: copious     Description of drainage: serous and serosanguinous     Measurements (length x width x depth, in cm): posterior leg- 19.5cm x 15cm x up to 0.3cm (d); anterior lower leg, 1.8x3.8x0.1cm medial most and 5,5x1.2x0.1cm lateral most      Tunneling: no     Undermining: no  Periwound skin: Dry/scaly and edema      Color: normal and consistent with surrounding tissue      Temperature: normal   Odor: none  Pain:  severe despite IV dilaudid, methadone and tylenol  Pain interventions prior to dressing change: slow and gentle cares  and distraction  Treatment goal: Heal , Drainage control, Infection control/prevention, Increase granulation, Decrease moisture, and Pain control  STATUS: initial assessment  Supplies ordered: gathered and at bedside       Treatment Plan:     Continue same cares buttock wounds     Right lower leg wound(s): Check daily initially, if drainage allows change every 2-3 days but if cover bandage is saturated or leaking the change daily. Soak bandages with saline as needed to remove. Pre-medicate for pain prior to bandage change.   1.) Cleanse periwound/leg with Wander wipes.   2.) Apply Vashe soaked gauze to all open wounds for 5 minutes. Then gently wipe with gauze and pat dry.   3.) Apply Aquaphor to any dry, flaky areas   4.) Apply xeroform to anterior lower leg  wounds and foot wounds if open (if dry leave dry, cover with dry gauze)   5.) Apply aquacel Ag to posterior leg wound (cut to fit wound)  6.) Cover posterior leg wound with Mextra Super absorbant pad and abd pad over anterior lower leg  7.) Secure with rolled gauze    Elevate lower leg on 2 pillows with heel floating as much as able        Orders: Written    RECOMMEND PRIMARY TEAM ORDER: None, at this time; plan is for vascular consult on discharge  Education provided: importance of repositioning, plan of care, wound progress, and Off-loading pressure  Discussed plan of care with: Patient and Nurse  [facility] nurse follow-up plan: twice weekly  Notify [facility] if wound(s) deteriorate.  Nursing to notify the Provider(s) and re-consult the [facility] Nurse if new skin concern.    DATA:     Current support surface: Standard  Standard Isoflex gel  Containment of urine/stool: Incontinence Protocol and Suction based external urinary catheter   BMI: Body mass index is 35.44 kg/m .   Active diet order: Orders Placed This Encounter      Regular Diet Adult     Output: I/O last 3 completed shifts:  In: 300 [I.V.:300]  Out: 1010 [Urine:1000; Blood:10]     Labs:   Recent Labs   Lab 07/15/24  0513 07/12/24  0709 07/11/24  0006   ALBUMIN  --   --  2.9*   HGB 8.8*   < > 10.3*   INR 1.42*   < > 2.96*   WBC 8.1   < > 11.1*    < > = values in this interval not displayed.     Pressure injury risk assessment:   Sensory Perception: 3-->slightly limited  Moisture: 3-->occasionally moist  Activity: 2-->chairfast  Mobility: 2-->very limited  Nutrition: 3-->adequate  Friction and Shear: 1-->problem  Suresh Score: 14    Lizabeth Botello RN, CWOCN  Pager no longer is use, please contact through [employer] group: [facility] Nurse   Dept. Office Number: [phone]

## 2024-07-15 NOTE — PROGRESS NOTES
[facility]    Hospitalist Progress Note    Date of Service (when I saw the patient): 07/15/2024    Assessment & Plan   Linda ELIE Loredo is a 79 year old female presented to ED on 7/10/2024 with altered mental status.      Metabolic encephalopathy  Presume due to infection, right lower extremity cellulitis vs osteomyelitis vs urinary tract infection    7/11/2024   -Resolved, rapid improvement after admission     Right lower extremity cellulitis  Multiple wounds suspect mixed venous stasis and pressure, r/o ischemic component  Possible osteomyelitis of foot  Large ulcer posterior calf  Ulcers on buttock/coccyx, right lower extremity (large area on posterior calf suspect pressure/venous stasis, 2 medium sized areas right lateral mid-foot, 2 abterior shin ulcers medial and lateral). Right distal lateral mid-foot ulcer worrisome for osteomyelitis     Recently started on keflex 7/3 without improvement.  On admission WBC 11, afebrile, lactate 1.4.  Started on Zosyn in ED.  CRP 6/19/24 193.  Procalcitonin 0.25.  Xray negative for osteomyelitis    ABIs 7/12/2024 -  ALMA cannot be obtained due to noncompressible vessels. Monophasic waveforms on the right suggests arterial disease which could be further evaluated with duplex ultrasound     Right lower extremity arterial duplex with sluggish arterial flow with no focal stenosis.    MRI right foot shows no evidence of osteomyelitis, septic arthropathy, or abscess.  -Continue zosyn   -Blood cultures x 2 from 7/11 shows no growth x 3 days  -Patient underwent debridement in the OR on 7/14/2024, postoperative cares per general surgery  -CT angiogram right lower extremity without evidence for arterial stenoses  -Wound consult for wound care  -OT and PT consults recommending TCU     Urinary tract infection  Recently hospitalized 6/6-10/24 for sepsis secondary to cellulitis of right lower extremity with 1/1 blood cultures positive for  pan-sensitive S. dysgalactiae. Repeat BCX 6/7 were negative. She also had a UTI and urine culture grew providencia rettgeri - resistant to zosyn/unasyn,  susceptible to rocephin. She was treated with zosyn and vanco, changed to ceftriaxone and discharged to a TCU with ceftriaxone for 2 weeks.  UA 7/11/2024:  >182 wbc. 24 rbc, large LE   -On zosyn, started 7/11  -UC from 7/11 shows less than 10K urogenital danny, but collected after zosyn given.     Chronic kidney disease 3a  Acute Kidney Injury, unspecified   Baseline creatinine 1.0- 1.1 (though 1.6 when last checked). Admission BUN/creatinine 84/2.0  Weight stable, down from discharge in 6/2024  -BUN/creatinine improved 25.7 / 0.99  -Hold diuretics  -Repeat BMP in a.m.     Atrial fibrillation, likely permanent   New onset during 5/2022 admission ...    Last EKG 1/2024 - afib - flutter. EKG 9/2023 atrial fibrillation     EKG 7/10/2024 - fib - flutter, rate controlled    Telemetry rate controlled atrial fibrillation/atrial flutter . Discontinued telemetry 7/12/2024   Vitamin K 2 mg IV given at 1230  -INR 1.40 on 7/14/2024, prior to surgery  -Okay to resume Coumadin on 7/15 per general surgery     Anemia, normocytic, likely anemia of chronic disease/chronic kidney disease   Admission HGB 10.3. Baseline mostly 10-10.5.  No evidence acute blood loss    B12 1349. Ferritin 189. %Fe sat 30     HGB stable 9.0 - 9.1   -Follow      Type 2 Diabetes:   Last A1c 9.9 3/2024  -Retstart Tresiba 34 in am , Lantus 20 x1  -Medium insulin sliding scale      'Chronic Right Sided Heart failure'   Pulmonary hypertension by echo and mild to moderate pulmonary hypertension by angiogram 2006  Chronic lower extremities edema  Venous stasis      Echo 5/2022 [facility] - Left Ventricle Normal left ventricular chamber size. Normal left ventricular systolic function. Calculated left ventricular ejection  fraction (modified Pan technique) is 69 %. No regional wall motion abnormalities.  Mildly increased left ventricular wall thickness. Indeterminate left ventricular diastolic function. Right ventricle was not well visualized. Estimated right ventricular systolic pressure is 34.3 mmHg plus right atrial pressure. Estimated right ventricular systolic pressure is 47.6 mmHg.      Normal right ventricular systolic function. Mild mitral valve regurgitation. Mild tricuspid valve regurgitation.   -Hold Zaroxolyn 2.5, and torsemide 20 given the GUSTAVO, KCL 20     Hypokalemia  Replace per protocol      Obstructive sleep apnea  Sleep study 2004 reportedly showed severe obstructive sleep apnea and recommend CPAP, Not available for review. Patient is untreated      Hypercalcemia   Primary hyperparathyroidism per problem list  Work up January 2013 juan Hernández. Hypercalcemia with elevated PTH. Not subsequently addressed 2014  On admission corrected calcium is >10.1, will monitor as appropriate    -ionized calcium normal 5.0     S/p left below-knee amputation 10/2023     Fibromyalgia  Opioid dependence  -Continue with methadone 5     History of hypertension  Patient is currently not taking any medications for hypertension     Hyperlipidemia  -Continue atorvastatin 40     Obesity  Complicates cares and contributes to morbidity     Hypoalbuminemia  Albumin = 2.9 g/dL at 7/11/2024, will monitor as appropriate     Diet: Combination Diet Moderate Consistent Carb (60 g CHO per Meal) Diet; Low Saturated Fat Na <2400mg Diet, No Caffeine Diet  NPO per Anesthesia Guidelines for Procedure/Surgery Except for: Meds    DVT Prophylaxis: Warfarin  Braga Catheter: Not present  Lines: None     Cardiac Monitoring: None  Code Status: Full Code       Disposition: Expected discharge pending need for future debridement.    35 MINUTES SPENT BY ME on the date of service doing chart review, history, exam, documentation & further activities per the note.    Jimmy Barfield MD    Interval History   Patient complains of pain in her  right ankle.  She states she is not interested in TCU at this time.    -Data reviewed today: I reviewed all new labs and imaging results over the last 24 hours. I personally reviewed no images or EKG's today.    Physical Exam   Temp: 97.4  F (36.3  C) Temp src: Oral BP: 120/62 Pulse: 73   Resp: 16 SpO2: 100 % O2 Device: None (Room air)    Vitals:    07/11/24 0302   Weight: 99.6 kg (219 lb 9.3 oz)     Vital Signs with Ranges  Temp:  [97.4  F (36.3  C)-98.7  F (37.1  C)] 97.4  F (36.3  C)  Pulse:  [70-98] 73  Resp:  [14-17] 16  BP: (110-120)/(47-73) 120/62  SpO2:  [98 %-100 %] 100 %  I/O last 3 completed shifts:  In: 300 [I.V.:300]  Out: 1010 [Urine:1000; Blood:10]    Gen: Well nourished, debilitated elderly female, alert and oriented x 3, no acute distressed  HEENT: Atraumatic, normocephalic; sclera non-injected, anicterric; oral mucosa moist, no lesion, no exudate  Lungs: Clear to ausculation, no wheezes, no rhonchi, no rales  Heart: Regular rate, regular rhythm, no gallops, no rubs, no murmurs  GI: Bowel sound normal, no hepatosplenomegaly, no masses, non-tender, non-distended, no guarding, no rebound tenderness  Lymph: No lymphadenopathy,  Skin: LLE BKA, RLE bandaged below knee    Medications   Current Facility-Administered Medications   Medication Dose Route Frequency Provider Last Rate Last Admin    Patient is already receiving anticoagulation with heparin, enoxaparin (LOVENOX), warfarin (COUMADIN)  or other anticoagulant medication   Does not apply Continuous PRN Amy Robles MD         Current Facility-Administered Medications   Medication Dose Route Frequency Provider Last Rate Last Admin    atorvastatin (LIPITOR) tablet 40 mg  40 mg Oral QPM Amy Robles MD   40 mg at 07/14/24 2059    [Held by provider] furosemide (LASIX) injection 40 mg  40 mg Intravenous Daily Amy Robles MD        insulin aspart (NovoLOG) injection (RAPID ACTING)  1-7 Units Subcutaneous TID AC Samy Tim MD   2  Units at 07/15/24 0841    insulin aspart (NovoLOG) injection (RAPID ACTING)  1-5 Units Subcutaneous At Bedtime Samy Tim MD   2 Units at 07/14/24 2246    insulin degludec (TRESIBA) 100 UNIT/ML injection 34 Units  34 Units Subcutaneous QAM Samy Tim MD   34 Units at 07/15/24 0826    LORazepam (ATIVAN) injection 1 mg  1 mg Intravenous Once Jimmy Barfield MD        methadone (DOLOPHINE) half-tab 5 mg  5 mg Oral 4x Daily Samy Tim MD   5 mg at 07/15/24 0825    [Held by provider] metolazone (ZAROXOLYN) tablet 2.5 mg  2.5 mg Oral Weekly Amy Robles MD        miconazole (MICATIN) 2 % powder   Topical BID Amy Robles MD   Given at 07/15/24 0830    multivitamin, therapeutic (THERA-VIT) tablet 1 tablet  1 tablet Oral Daily Samy Tim MD   1 tablet at 07/15/24 0825    piperacillin-tazobactam (ZOSYN) 3.375 g vial to attach to  mL bag  3.375 g Intravenous Q6H Samy Tim MD   3.375 g at 07/15/24 0451    potassium chloride aldair ER (KLOR-CON M20) CR tablet 20 mEq  20 mEq Oral Daily Samy Tim MD   20 mEq at 07/15/24 0826    sodium chloride (PF) 0.9% PF flush 3 mL  3 mL Intracatheter Q8H Amy Robles MD   3 mL at 07/15/24 0844    [Held by provider] torsemide (DEMADEX) tablet 60 mg  60 mg Oral Daily Amy Robles MD        Warfarin Dose Required Daily - Pharmacist Managed  1 each Oral See Admin Instructions Jimmy Barfield MD           Data   Recent Labs   Lab 07/15/24  0755 07/15/24  0513 07/14/24  2210 07/14/24  0614 07/14/24  0527 07/14/24  0214 07/13/24  2352 07/13/24  1505 07/13/24  1339 07/13/24  0516 07/11/24  0810 07/11/24  0006   WBC  --  8.1  --   --  10.0  --   --   --   --  8.8   < > 11.1*   HGB  --  8.8*  --   --  9.7*  --   --   --   --  9.0*   < > 10.3*   MCV  --  93  --   --  94  --   --   --   --  93   < > 90   PLT  --  190  --   --  210  --   --   --   --  205   < > 289   INR  --  1.42*  --   --  1.40*  --  1.59*   < > 3.30* 3.51*   < > 2.96*    NA  --  133*  --   --  137  --   --   --   --  139   < > 132*   POTASSIUM  --  3.6  --   --  3.5  --   --   --  3.9 2.9*   < > 3.6   CHLORIDE  --  98  --   --  99  --   --   --   --  99   < > 88*   CO2  --  28  --   --  29  --   --   --   --  32*   < > 31*   BUN  --  23.3*  --   --  25.7*  --   --   --   --  33.7*   < > 84.1*   CR  --  0.99*  --   --  0.99*  --   --   --   --  1.14*   < > 2.00*   ANIONGAP  --  7  --   --  9  --   --   --   --  8   < > 13   ZAKIA  --  8.7*  --   --  9.0  --   --   --   --  9.0   < > 9.9   * 268* 257*   < > 148*   < >  --    < >  --  157*   < > 77   ALBUMIN  --   --   --   --   --   --   --   --   --   --   --  2.9*   PROTTOTAL  --   --   --   --   --   --   --   --   --   --   --  8.3   BILITOTAL  --   --   --   --   --   --   --   --   --   --   --  0.7   ALKPHOS  --   --   --   --   --   --   --   --   --   --   --  101   ALT  --   --   --   --   --   --   --   --   --   --   --  21   AST  --   --   --   --   --   --   --   --   --   --   --  56*    < > = values in this interval not displayed.       Recent Results (from the past 24 hour(s))   CTA Lower Extremity Right with Contrast    Narrative    EXAM: CTA LOWER EXTREMITY RIGHT WITH CONTRAST  LOCATION: [facility]  DATE: 7/14/2024    INDICATION: Nonhealing right lower extremity ulcer.  COMPARISON: None.  TECHNIQUE: Helical acquisition through the right lower extremity was performed during the arterial phase of contrast enhancement using IV Contrast. 2D and 3D reconstructions were performed by the CT technologist. Dose reduction techniques were used.   CONTRAST: 100mL isovue 370    FINDINGS:  ANGIOGRAM:  Abdominal Aorta: Mild to moderate calcifications of the visualized abdominal aorta.    Right Common Iliac Artery: Patent.  Right External Iliac Artery: Patent.  Right Internal Iliac Artery: Patent.    Right Common Femoral Artery: Patent.  Right Superficial Femoral Artery: Patent. Extensive vascular  calcifications without significant stenosis.  Right Deep Femoral Artery: Patent.  Right Popliteal Artery: Patent. Extensive vascular calcifications without significant stenosis.    Right Tibioperoneal Trunk: Patent.  Right Anterior Tibial Artery: Patent.  Right Posterior Tibial Artery: Patent.  Right Peroneal Artery: Patent.      OTHER FINDINGS:  Status post cholecystectomy. Visualized liver and right kidney are grossly unremarkable. Large amount of stool in the rectum, which is distended to 8.8 cm in diameter. Mild rectal wall thickening and perirectal fat stranding. Large amount of stool in the   visualized colon. Visualized small bowel appears grossly unremarkable. Visualized uterus appears grossly unremarkable. Visualized urinary bladder appears grossly unremarkable.    Severe atrophy of the right gastrocnemius muscle and moderate atrophy of the soleus muscle. Atrophy of the intrinsic foot musculature.    No acute fracture or dislocation.    Multilevel degenerative changes of the visualized spine. Severe tricompartmental degenerative changes of the right knee. Moderate degenerative changes of the midfoot.    No cortical erosion, focal osteolysis, or periosteal reaction to suggest CT evidence of osteomyelitis.    Status post left below-the-knee amputation seen on  radiograph.    Mild skin thickening and subcutaneous edema of the right leg. Moderate subcutaneous edema of the dorsal foot as well as adjacent to the calcaneus. No rim-enhancing fluid collection identified.      Impression    IMPRESSION:  1.  No significant arterial stenosis or thrombosis in the right lower extremity. Three-vessel runoff to the foot.    2.  Large amount of stool in the distended rectum, with mild wall thickening and fat stranding. Correlate for fecal impaction and stercoral colitis.

## 2024-07-15 NOTE — PROGRESS NOTES
Patient down to CT after LOTS of coaching from 3 different RN'S!  EMERGENCY ROOM here to start 18 g. In AC for CT.  Supper BS = 51 rec'd 2 org juices  Patient placed on zoom cart via CL,for CT  Patient tolerating Methadone schedule

## 2024-07-15 NOTE — PLAN OF CARE
Goal Outcome Evaluation:      Plan of Care Reviewed With: patient, caregiver, family          Outcome Evaluation: TCU cares post hospital    LIZY Rowley on 7/15/2024 at 3:50 PM

## 2024-07-15 NOTE — PROGRESS NOTES
"GENERAL SURGERY Progress Note    Admission Date: 7/10/2024  7/15/2024         Assessment and Plan:     Linda Loredo is a 79 year old female S/P Procedure(s):  IRRIGATION AND DEBRIDEMENT, LOWER EXTREMITY, RIGHT LOWER LEG, 1 Day Post-Op.    - Okay for regular diet  -Continue wound care per wound care RN  -Defer to medicine team for appropriate antibiotics given patient's wound and cellulitis  -Discussed with Dr. Gutierrez  -Surgery team will sign off  -Recommend outpatient vascular surgery consultation              Interval History:     Patient is with wound care team during examination. Does not voice concerns. States she is reasonably comfortable given her wounds and issues. Denies fevers or chills. Denies nausea or vomiting. Afebrile and WBC normal.                      Physical Exam:   Blood pressure 120/62, pulse 73, temperature 97.4  F (36.3  C), temperature source Oral, resp. rate 16, height 1.676 m (5' 6\"), weight 99.6 kg (219 lb 9.3 oz), SpO2 100%.  Temperature Temp  Av.2  F (36.8  C)  Min: 97.4  F (36.3  C)  Max: 98.7  F (37.1  C)   I/O last 3 completed shifts:  In: 300 [I.V.:300]  Out: 1010 [Urine:1000; Blood:10]    Constitutional- No acute distress, well nourished, non-toxic  Respiratory- nonlabored breathing on room air  Cardiovascular - Heart RRR  Musculoskeletal: left below the knee amputation. Right lower extremity with dressing in place. No strikethrough. No expanding redness, fluctuance or palpable fluid collection.   Skin: Warm, Dry         Data:     Recent Labs   Lab Test 07/15/24  0513 24  0527 24  0516   WBC 8.1 10.0 8.8   HGB 8.8* 9.7* 9.0*   HCT 27.4* 30.1* 27.7*    210 205      Recent Labs   Lab Test 07/15/24  0513 24  0527 24  1339 24  0516   * 137  --  139   POTASSIUM 3.6 3.5 3.9 2.9*   CHLORIDE 98 99  --  99   CO2 28 29  --  32*   BUN 23.3* 25.7*  --  33.7*   CR 0.99* 0.99*  --  1.14*     Recent Labs   Lab Test 24  0006 " 06/06/24  1150 11/17/23  1033   BILITOTAL 0.7 0.7 0.5   ALT 21 24 11   AST 56* 30 20   ALKPHOS 101 123 90      Recent Labs   Lab Test 07/15/24  0513 07/14/24  0527 07/13/24  2352   INR 1.42* 1.40* 1.59*     Recent Labs   Lab Test 07/15/24  0513 07/14/24  0527 07/13/24  0516 07/12/24  0709 11/01/23  0613 10/21/23  0637 10/18/23  0610 10/17/23  0602 10/11/23  0616 10/10/23  0613   ZAKIA 8.7* 9.0 9.0 9.2   < > 9.9   < > 9.9   < > 10.1   PHOS  --   --   --   --   --  3.2  --  2.6  --  2.9   MAG  --   --   --  2.2  --  1.9  --  1.7  --  2.0    < > = values in this interval not displayed.     Recent Labs   Lab Test 07/15/24  0513 07/14/24  0527 07/13/24  0516 07/12/24  0709 07/11/24  1238 07/11/24  0006 06/07/24  0536 06/06/24  1414 06/06/24  1150 11/24/23  1012 11/17/23  1033 09/26/23  0545 09/23/23  1302   ANIONGAP 7 9 8   < >  --  13   < >  --  15   < > 11   < >  --    PROTEIN  --   --   --   --  Negative  --   --  100*  --   --   --   --  30*   ALBUMIN  --   --   --   --   --  2.9*  --   --  3.6  --  3.8   < >  --     < > = values in this interval not displayed.       MELO QUINTANA PA-C  07/15/24

## 2024-07-16 ENCOUNTER — LAB REQUISITION (OUTPATIENT)
Dept: LAB | Facility: CLINIC | Age: 79
End: 2024-07-16
Payer: COMMERCIAL

## 2024-07-16 VITALS
RESPIRATION RATE: 18 BRPM | WEIGHT: 219.58 LBS | SYSTOLIC BLOOD PRESSURE: 105 MMHG | TEMPERATURE: 97.9 F | HEART RATE: 74 BPM | BODY MASS INDEX: 35.29 KG/M2 | DIASTOLIC BLOOD PRESSURE: 58 MMHG | OXYGEN SATURATION: 100 % | HEIGHT: 66 IN

## 2024-07-16 DIAGNOSIS — L03.115 CELLULITIS OF RIGHT LOWER LIMB: ICD-10-CM

## 2024-07-16 LAB
ANION GAP SERPL CALCULATED.3IONS-SCNC: 9 MMOL/L (ref 7–15)
BACTERIA BLD CULT: NO GROWTH
BACTERIA BLD CULT: NO GROWTH
BUN SERPL-MCNC: 19.4 MG/DL (ref 8–23)
CALCIUM SERPL-MCNC: 8.6 MG/DL (ref 8.8–10.2)
CHLORIDE SERPL-SCNC: 102 MMOL/L (ref 98–107)
CREAT SERPL-MCNC: 1.03 MG/DL (ref 0.51–0.95)
EGFRCR SERPLBLD CKD-EPI 2021: 55 ML/MIN/1.73M2
ERYTHROCYTE [DISTWIDTH] IN BLOOD BY AUTOMATED COUNT: 15.9 % (ref 10–15)
GLUCOSE BLDC GLUCOMTR-MCNC: 237 MG/DL (ref 70–99)
GLUCOSE BLDC GLUCOMTR-MCNC: 301 MG/DL (ref 70–99)
GLUCOSE SERPL-MCNC: 231 MG/DL (ref 70–99)
HCO3 SERPL-SCNC: 27 MMOL/L (ref 22–29)
HCT VFR BLD AUTO: 29.3 % (ref 35–47)
HGB BLD-MCNC: 9.5 G/DL (ref 11.7–15.7)
INR PPP: 1.46 (ref 0.85–1.15)
MCH RBC QN AUTO: 30.4 PG (ref 26.5–33)
MCHC RBC AUTO-ENTMCNC: 32.4 G/DL (ref 31.5–36.5)
MCV RBC AUTO: 94 FL (ref 78–100)
PLATELET # BLD AUTO: 190 10E3/UL (ref 150–450)
POTASSIUM SERPL-SCNC: 3.5 MMOL/L (ref 3.4–5.3)
RBC # BLD AUTO: 3.13 10E6/UL (ref 3.8–5.2)
SODIUM SERPL-SCNC: 138 MMOL/L (ref 135–145)
WBC # BLD AUTO: 8.3 10E3/UL (ref 4–11)

## 2024-07-16 PROCEDURE — 250N000013 HC RX MED GY IP 250 OP 250 PS 637: Performed by: INTERNAL MEDICINE

## 2024-07-16 PROCEDURE — 36415 COLL VENOUS BLD VENIPUNCTURE: CPT | Performed by: INTERNAL MEDICINE

## 2024-07-16 PROCEDURE — 99239 HOSP IP/OBS DSCHRG MGMT >30: CPT | Performed by: INTERNAL MEDICINE

## 2024-07-16 PROCEDURE — 80048 BASIC METABOLIC PNL TOTAL CA: CPT | Performed by: INTERNAL MEDICINE

## 2024-07-16 PROCEDURE — 85027 COMPLETE CBC AUTOMATED: CPT | Performed by: INTERNAL MEDICINE

## 2024-07-16 PROCEDURE — 85610 PROTHROMBIN TIME: CPT | Performed by: INTERNAL MEDICINE

## 2024-07-16 PROCEDURE — 250N000011 HC RX IP 250 OP 636: Performed by: INTERNAL MEDICINE

## 2024-07-16 RX ORDER — ACETAMINOPHEN 500 MG
1000 TABLET ORAL EVERY 8 HOURS PRN
DISCHARGE
Start: 2024-07-16

## 2024-07-16 RX ORDER — METHADONE HYDROCHLORIDE 5 MG/1
5 TABLET ORAL 4 TIMES DAILY
Qty: 20 TABLET | Refills: 0 | Status: SHIPPED | OUTPATIENT
Start: 2024-07-16 | End: 2024-07-19

## 2024-07-16 RX ADMIN — PIPERACILLIN AND TAZOBACTAM 3.38 G: 3; .375 INJECTION, POWDER, FOR SOLUTION INTRAVENOUS at 11:19

## 2024-07-16 RX ADMIN — ACETAMINOPHEN 1000 MG: 500 TABLET, FILM COATED ORAL at 08:02

## 2024-07-16 RX ADMIN — METHADONE HYDROCHLORIDE 5 MG: 5 TABLET ORAL at 11:50

## 2024-07-16 RX ADMIN — THERA TABS 1 TABLET: TAB at 08:02

## 2024-07-16 RX ADMIN — POTASSIUM CHLORIDE 20 MEQ: 1500 TABLET, EXTENDED RELEASE ORAL at 08:02

## 2024-07-16 RX ADMIN — MICONAZOLE NITRATE: 20 POWDER TOPICAL at 08:04

## 2024-07-16 RX ADMIN — PIPERACILLIN AND TAZOBACTAM 3.38 G: 3; .375 INJECTION, POWDER, FOR SOLUTION INTRAVENOUS at 05:45

## 2024-07-16 RX ADMIN — INSULIN DEGLUDEC 34 UNITS: 100 INJECTION, SOLUTION SUBCUTANEOUS at 08:02

## 2024-07-16 RX ADMIN — METHADONE HYDROCHLORIDE 5 MG: 5 TABLET ORAL at 08:03

## 2024-07-16 ASSESSMENT — ACTIVITIES OF DAILY LIVING (ADL)
ADLS_ACUITY_SCORE: 55

## 2024-07-16 NOTE — PLAN OF CARE
"  Problem: Adult Inpatient Plan of Care  Goal: Plan of Care Review  Description: The Plan of Care Review/Shift note should be completed every shift.  The Outcome Evaluation is a brief statement about your assessment that the patient is improving, declining, or no change.  This information will be displayed automatically on your shift  note.  Outcome: Not Progressing  Goal: Patient-Specific Goal (Individualized)  Description: You can add care plan individualizations to a care plan. Examples of Individualization might be:  \"Parent requests to be called daily at 9am for status\", \"I have a hard time hearing out of my right ear\", or \"Do not touch me to wake me up as it startles  me\".  Outcome: Not Progressing  Problem: Infection  Goal: Absence of Infection Signs and Symptoms  Outcome: Not Progressing  Intervention: Prevent or Manage Infection  Recent Flowsheet Documentation  Taken 7/15/2024 1700 by Clarita Martinez RN  Isolation Precautions: contact precautions maintained   Goal Outcome Evaluation:    Pt not progressing, overall. Spent most of evening asleep or crying in pain. When asked about pain pt stated \"it hurt before but its fine now\". Pt ate small amount for dinner but asked for ice cream. Pt refusing repositioning attempts from multiple staff. Leg dressing intact.       "

## 2024-07-16 NOTE — PROGRESS NOTES
Care Management Discharge Note    Discharge Date: 07/16/2024       Discharge Disposition: Transitional Care    Discharge Services: Transportation Services    Discharge DME: None    Discharge Transportation: agency    Private pay costs discussed: transportation costs    Does the patient's insurance plan have a 3 day qualifying hospital stay waiver?  Yes     Which insurance plan 3 day waiver is available? Alternative insurance waiver    Will the waiver be used for post-acute placement? Yes    PAS Confirmation Code: [id number]  Patient/family educated on Medicare website which has current facility and service quality ratings: no    Education Provided on the Discharge Plan: Yes  Persons Notified of Discharge Plans: patient, daughter Mel Leigh admissions at [facility]  Patient/Family in Agreement with the Plan: yes    Handoff Referral Completed: Yes    Additional Information:  Per MD at IDT rounds pt is ready for discharge today.   CM received call from Mel at [facility] Phone: (Admissions: [phone] RN Report: [phone] Fax: [phone]) that they are able to accept pt for cares today. Cm updated pt and bk Leigh who are in agreement, Per therapy recommending stretcher transport.    CM set up EMS transport to take pts w.c with, as this is collapsible. Pt given address and phone number for TCU.  Cm updated Meghana RN at [facility] with pts disposition plan.     Plan: [facility] Phone: (Admissions: [phone] RN Report: [phone] Fax: [phone])     Transport: stretcher between [id number]    WAQAR Garvin  Care Transitions Registered Nurse  Tele: [phone]

## 2024-07-16 NOTE — PLAN OF CARE
Physical Therapy Discharge Summary    Reason for therapy discharge:    Discharged to transitional care facility.    Progress towards therapy goal(s). See goals on Care Plan in [facility] electronic health record for goal details.  Goals partially met.  Barriers to achieving goals:   discharge from facility.    Therapy recommendation(s):    Continued therapy is recommended.  Rationale/Recommendations:  Recommend continued PT at TCU to maximize safe and indep mobility, likely needs higher level of care following TCU stay.

## 2024-07-16 NOTE — DISCHARGE SUMMARY
[facility]  Hospitalist Discharge Summary       Date of Admission:  7/10/2024  Date of Discharge:  7/16/2024  Discharging Provider: Jimmy Barfield MD      Discharge Diagnoses     Metabolic encephalopathy  Right lower extremity cellulitis  Multiple wounds suspect mixed venous stasis and pressure, r/o ischemic component  Possible osteomyelitis of foot  Large ulcer posterior calf  Urinary tract infection  Chronic kidney disease 3a  Acute Kidney Injury, unspecified   Atrial fibrillation, likely permanent   Anemia, normocytic, likely anemia of chronic disease/chronic kidney disease   Type 2 Diabetes:   Chronic Right Sided Heart failure'   Pulmonary hypertension by echo and mild to moderate pulmonary hypertension by angiogram 2006  Chronic lower extremities edema  Venous stasis   Hypokalemia  Obstructive sleep apnea  Hypercalcemia   Primary hyperparathyroidism per problem list  S/p left below-knee amputation 10/2023  Fibromyalgia  Opioid dependence  History of hypertension  Hyperlipidemia  Obesity  Hypoalbuminemia    Follow-ups Needed After Discharge   Follow-up Appointments     Follow Up and recommended labs and tests      Follow up with long term physician.  The following labs/tests are   recommended: CMP and CBC with differential.          Discharge Disposition   Discharged to short-term care facility  Condition at discharge: Fair    Hospital Course   Linda Loredo is a 79 year old female presented to ED on 7/10/2024 with altered mental status.      Metabolic encephalopathy  Presume due to infection, right lower extremity cellulitis vs osteomyelitis vs urinary tract infection    7/11/2024   -Resolved, rapid improvement after admission     Right lower extremity cellulitis  Multiple wounds suspect mixed venous stasis and pressure, r/o ischemic component  Possible osteomyelitis of foot  Large ulcer posterior calf  Ulcers on buttock/coccyx, right lower extremity (large  area on posterior calf suspect pressure/venous stasis, 2 medium sized areas right lateral mid-foot, 2 abterior shin ulcers medial and lateral). Right distal lateral mid-foot ulcer worrisome for osteomyelitis     Recently started on keflex 7/3 without improvement.  On admission WBC 11, afebrile, lactate 1.4.  Started on Zosyn in ED.  CRP 6/19/24 193.  Procalcitonin 0.25.  Xray negative for osteomyelitis    ABIs 7/12/2024 -  ALMA cannot be obtained due to noncompressible vessels. Monophasic waveforms on the right suggests arterial disease which could be further evaluated with duplex ultrasound     Right lower extremity arterial duplex with sluggish arterial flow with no focal stenosis.    MRI right foot shows no evidence of osteomyelitis, septic arthropathy, or abscess.  -Continue zosyn   -Blood cultures x 2 from 7/11 shows no growth x 3 days  -Patient underwent debridement in the OR on 7/14/2024, postoperative cares per general surgery  -CT angiogram right lower extremity without evidence for arterial stenoses  -Wound consult for wound care  -OT and PT consults recommending TCU     Urinary tract infection  Recently hospitalized 6/6-10/24 for sepsis secondary to cellulitis of right lower extremity with 1/1 blood cultures positive for pan-sensitive S. dysgalactiae. Repeat BCX 6/7 were negative. She also had a UTI and urine culture grew providencia rettgeri - resistant to zosyn/unasyn,  susceptible to rocephin. She was treated with zosyn and vanco, changed to ceftriaxone and discharged to a TCU with ceftriaxone for 2 weeks.  UA 7/11/2024:  >182 wbc. 24 rbc, large LE   -On zosyn, started 7/11  -UC from 7/11 shows less than 10K urogenital danny, but collected after zosyn given.  -Completed course of Zosyn during hospitalization, no further antibiotics indicated     Chronic kidney disease 3a  Acute Kidney Injury, unspecified   Baseline creatinine 1.0- 1.1 (though 1.6 when last checked). Admission BUN/creatinine  84/2.0  Weight stable, down from discharge in 6/2024  -BUN/creatinine improved 19.4/  1.03 at discharge  -Hold diuretics at discharge  -Daily weights at TCU     Atrial fibrillation, likely permanent   New onset during 5/2022 admission ...    Last EKG 1/2024 - afib - flutter. EKG 9/2023 atrial fibrillation     EKG 7/10/2024 - fib - flutter, rate controlled    Telemetry rate controlled atrial fibrillation/atrial flutter . Discontinued telemetry 7/12/2024   Vitamin K 2 mg IV given at 1230  -INR 1.40 on 7/14/2024, prior to surgery  -Okay to resume Coumadin on 7/15 per general surgery     Anemia, normocytic, likely anemia of chronic disease/chronic kidney disease   Admission HGB 10.3. Baseline mostly 10-10.5.  No evidence acute blood loss    B12 1349. Ferritin 189. %Fe sat 30     HGB stable 9.0 - 9.1   - Follow up CBC at discharge     Type 2 Diabetes:   Last A1c 9.9 3/2024  -Resume Tresiba 34 at discharge     'Chronic Right Sided Heart failure'   Pulmonary hypertension by echo and mild to moderate pulmonary hypertension by angiogram 2006  Chronic lower extremities edema  Venous stasis      Echo 5/2022 [facility] - Left Ventricle Normal left ventricular chamber size. Normal left ventricular systolic function. Calculated left ventricular ejection  fraction (modified Pan technique) is 69 %. No regional wall motion abnormalities. Mildly increased left ventricular wall thickness. Indeterminate left ventricular diastolic function. Right ventricle was not well visualized. Estimated right ventricular systolic pressure is 34.3 mmHg plus right atrial pressure. Estimated right ventricular systolic pressure is 47.6 mmHg.      Normal right ventricular systolic function. Mild mitral valve regurgitation. Mild tricuspid valve regurgitation.   -Hold Zaroxolyn 2.5, and torsemide 20 given the GUSTAVO, KCL 20     Hypokalemia  Replace per protocol      Obstructive sleep apnea  Sleep study 2004 reportedly showed severe obstructive sleep  apnea and recommend CPAP, Not available for review. Patient is untreated      Hypercalcemia   Primary hyperparathyroidism per problem list  Work up January 2013 juan Hernández. Hypercalcemia with elevated PTH. Not subsequently addressed 2014  On admission corrected calcium is >10.1, will monitor as appropriate    -ionized calcium normal 5.0     S/p left below-knee amputation 10/2023     Fibromyalgia  Opioid dependence  -Continue with methadone 5     History of hypertension  Patient is currently not taking any medications for hypertension     Hyperlipidemia  -Continue atorvastatin 40     Obesity  Complicates cares and contributes to morbidity     Hypoalbuminemia  Albumin = 2.9 g/dL at 7/11/2024, will monitor as appropriate     Consultations This Hospital Stay   WOUND OSTOMY CONTINENCE NURSE  IP CONSULT  WOUND OSTOMY CONTINENCE NURSE  IP CONSULT  PHARMACY TO DOSE WARFARIN  CARE MANAGEMENT / SOCIAL WORK IP CONSULT  SURGERY GENERAL IP CONSULT  PHARMACY TO DOSE WARFARIN  SPIRITUAL HEALTH SERVICES IP CONSULT  PHYSICAL THERAPY ADULT IP CONSULT  OCCUPATIONAL THERAPY ADULT IP CONSULT  WOUND OSTOMY CONTINENCE NURSE  IP CONSULT  WOUND OSTOMY CONTINENCE NURSE  IP CONSULT  PHYSICAL THERAPY ADULT IP CONSULT  OCCUPATIONAL THERAPY ADULT IP CONSULT    Code Status   Full Code    40 MINUTES SPENT BY ME on the date of service doing chart review, history, exam, documentation & further activities per the note.         Jimmy Barfield MD  [facility]  ______________________________________________________________________    Physical Exam   Vital Signs: Temp: 97.9  F (36.6  C) Temp src: Oral BP: 123/62 Pulse: 74   Resp: 18 SpO2: 100 % O2 Device: None (Room air)    Weight: 219 lbs 9.25 oz       Gen: Well nourished, debilitated elderly female, alert and oriented x 3, no acute distressed  HEENT: Atraumatic, normocephalic; sclera non-injected, anicterric; oral mucosa moist, no lesion, no exudate  Lungs: Clear  to ausculation, no wheezes, no rhonchi, no rales  Heart: Regular rate, regular rhythm, no gallops, no rubs, no murmurs  GI: Bowel sound normal, no hepatosplenomegaly, no masses, non-tender, non-distended, no guarding, no rebound tenderness  Lymph: No lymphadenopathy,  Skin: LLE BKA, RLE bandaged over calf    Primary Care Physician   [facility]    Discharge Orders      General info for SNF    Length of Stay Estimate: Short Term Care: Estimated # of Days <30  Condition at Discharge: Improving  Level of care:skilled   Rehabilitation Potential: Good  Admission H&P remains valid and up-to-date: Yes  Recent Chemotherapy: N/A  Use Nursing Home Standing Orders: Yes     Mantoux instructions    Give two-step Mantoux (PPD) Per Facility Policy Yes     Reason for your hospital stay    This is a 39-year-old female admitted with a nonhealing infected right calf wound.     Follow Up and recommended labs and tests    Follow up with [facility] physician.  The following labs/tests are recommended: CMP and CBC with differential.     Activity - Up with nursing assistance     Glucose monitor nursing POCT    Before meals and at bedtime     Daily weights    Call Provider for weight gain of more than 2 pounds per day or 5 pounds per week.     Wound care    Right lower leg wound(s): Check daily initially, if drainage allows change every 2-3 days but if cover bandage is saturated or leaking the change daily. Soak bandages with saline as needed to remove. Pre-medicate for pain prior to bandage change.   1.) Cleanse periwound/leg with Wander wipes.   2.) Apply Vashe soaked gauze to all open wounds for 5 minutes. Then gently wipe with gauze and pat dry.   3.) Apply Aquaphor to any dry, flaky areas   4.) Apply xeroform to anterior lower leg wounds and foot wounds if open (if dry leave dry, cover with dry gauze)   5.) Apply aquacel Ag to posterior leg wound (cut to fit wound)  6.) Cover posterior leg wound with Mextra Super  absorbant pad and abd pad over anterior lower leg  7.) Secure with rolled gauze     Elevate lower leg on 2 pillows with heel floating as much as able     Physical Therapy Adult Consult    Evaluate and treat as clinically indicated.    Reason:  Physical deconditioning     Occupational Therapy Adult Consult    Evaluate and treat as clinically indicated.    Reason:  Physical deconditioning     Fall precautions     Diet    Follow this diet upon discharge: Orders Placed This Encounter      Regular Diet Adult         Significant Results and Procedures   Most Recent 3 CBC's:  Recent Labs   Lab Test 07/16/24  0529 07/15/24  0513 07/14/24 0527   WBC 8.3 8.1 10.0   HGB 9.5* 8.8* 9.7*   MCV 94 93 94    190 210     Most Recent 3 BMP's:  Recent Labs   Lab Test 07/16/24  1132 07/16/24  0800 07/16/24  0529 07/15/24  0755 07/15/24  0513 07/14/24  0614 07/14/24  0527   NA  --   --  138  --  133*  --  137   POTASSIUM  --   --  3.5  --  3.6  --  3.5   CHLORIDE  --   --  102  --  98  --  99   CO2  --   --  27  --  28  --  29   BUN  --   --  19.4  --  23.3*  --  25.7*   CR  --   --  1.03*  --  0.99*  --  0.99*   ANIONGAP  --   --  9  --  7  --  9   ZAKIA  --   --  8.6*  --  8.7*  --  9.0   * 237* 231*   < > 268*   < > 148*    < > = values in this interval not displayed.   ,   Results for orders placed or performed during the hospital encounter of 07/10/24   XR Foot Right 2 Views    Narrative    FOOT TWO VIEWS RIGHT  7/12/2024 12:23 PM     HISTORY: ulcer, possible osteomyelitis, lateral mid foot  COMPARISON: None.      Impression    IMPRESSION: Limited evaluation due to positioning and osteopenia.  Diffuse soft tissue swelling. No definite evidence of acute  osteomyelitis or fracture. Mild-to-moderate degenerative changes  throughout the foot. Small plantar calcaneal enthesophyte. Bony  bunion. Vascular calcification.    KENNETH DOLL MD         SYSTEM ID:  [id number]   US ALMA with PPG wo Exercise Right    Narrative     EXAM: RESTING ANKLE-BRACHIAL INDICES (ABIs)  LOCATION: [facility]  DATE: 7/12/2024    INDICATION: non healing ulcers  COMPARISON: None.    ALMA FINDINGS:  RIGHT  Brachial: 119  Ankle (PT): Noncompressible vessels  Ankle (DP): Noncompressible vessels   Digit: 122 Index: 1.03    The right ALMA at rest cannot be obtained.    WAVEFORMS: The dorsalis pedis and posterior tibial arteries are monophasic.      Impression    IMPRESSION:  1.  RIGHT LOWER EXTREMITY: ALMA cannot be obtained due to noncompressible vessels. Monophasic waveforms on the right suggests arterial disease which could be further evaluated with duplex ultrasound as clinically indicated.     US Lower Extremity Arterial Duplex Right    Narrative    EXAM: US LOWER EXTREMITY ARTERIAL DUPLEX RIGHT  LOCATION: [facility]  DATE: 7/13/2024    INDICATION: Nonhealing right lower extremity ulcers   COMPARISON: 9/30/2023   TECHNIQUE: Duplex utilizing 2D gray-scale imaging, Doppler interrogation with color-flow and spectral waveform analysis.    FINDINGS: Diffuse right lower extremity arterial atherosclerosis with no significant focal stenoses. Since 9/30/2023, blood flow has become sluggish with damped monophasic waveforms.     RIGHT LOWER EXTREMITY ARTERIAL ASSESSMENT:  Common femoral artery: 147  cm/s, biphasic  Profunda femoris artery: 111  cm/s, biphasic  SFA (proximal): 139  cm/s, monophasic  SFA (mid): 165  cm/s, monophasic  SFA (distal): 148  cm/s, monophasic  Popliteal artery: 152  cm/s, monophasic  Anterior tibial artery: 47  cm/s, monophasic  Posterior tibial artery: 73  cm/s, monophasic  Dorsalis pedis artery: 70  cm/s, monophasic      Impression    IMPRESSION:  Sluggish right lower extremity arterial blood flow with no focal stenoses    MR Foot Right w/o & w Contrast    Narrative    EXAM: MR FOOT RIGHT W/O and W CONTRAST  LOCATION: [facility]  DATE: 07/13/2024    INDICATION: Ulcer,  possible osteomyelitis, pain and swelling.  COMPARISON: None.  TECHNIQUE: Routine. Additional post-gadolinium T1 sequences were obtained.  IV CONTRAST: Gadavist 9.5 mL.    FINDINGS:   JOINTS AND BONES:   -No evidence for fracture. No evidence for osteomyelitis. No effusion to suggest septic arthropathy. Degenerative change 1st MTP joint. Hammertoe deformities.    TENDONS:   -The flexor and extensor tendons are negative for tendinopathy, tenosynovitis, or tearing. The hallucis tendons are negative.     LIGAMENTS:   -The ligament of Lisfranc is intact. The collateral ligaments at the MTP joints are all intact.    MUSCLES AND SOFT TISSUES:   -Fatty infiltration and atrophy of the plantar and interosseous musculature. Edema or cellulitis along the dorsal aspect of the foot.      Impression    IMPRESSION:  1.  No evidence for osteomyelitis, septic arthropathy, or abscess.  2.  No evidence for fracture.  3.  Hammertoe deformities.  4.  Degenerative change 1st MTP joint.  5.  Fatty infiltration and atrophy of the plantar and interosseous musculature.  6.  Edema or cellulitis along the dorsal aspect of the foot.   CTA Lower Extremity Right with Contrast    Narrative    EXAM: CTA LOWER EXTREMITY RIGHT WITH CONTRAST  LOCATION: [facility]  DATE: 7/14/2024    INDICATION: Nonhealing right lower extremity ulcer.  COMPARISON: None.  TECHNIQUE: Helical acquisition through the right lower extremity was performed during the arterial phase of contrast enhancement using IV Contrast. 2D and 3D reconstructions were performed by the CT technologist. Dose reduction techniques were used.   CONTRAST: 100mL isovue 370    FINDINGS:  ANGIOGRAM:  Abdominal Aorta: Mild to moderate calcifications of the visualized abdominal aorta.    Right Common Iliac Artery: Patent.  Right External Iliac Artery: Patent.  Right Internal Iliac Artery: Patent.    Right Common Femoral Artery: Patent.  Right Superficial Femoral Artery:  Patent. Extensive vascular calcifications without significant stenosis.  Right Deep Femoral Artery: Patent.  Right Popliteal Artery: Patent. Extensive vascular calcifications without significant stenosis.    Right Tibioperoneal Trunk: Patent.  Right Anterior Tibial Artery: Patent.  Right Posterior Tibial Artery: Patent.  Right Peroneal Artery: Patent.      OTHER FINDINGS:  Status post cholecystectomy. Visualized liver and right kidney are grossly unremarkable. Large amount of stool in the rectum, which is distended to 8.8 cm in diameter. Mild rectal wall thickening and perirectal fat stranding. Large amount of stool in the   visualized colon. Visualized small bowel appears grossly unremarkable. Visualized uterus appears grossly unremarkable. Visualized urinary bladder appears grossly unremarkable.    Severe atrophy of the right gastrocnemius muscle and moderate atrophy of the soleus muscle. Atrophy of the intrinsic foot musculature.    No acute fracture or dislocation.    Multilevel degenerative changes of the visualized spine. Severe tricompartmental degenerative changes of the right knee. Moderate degenerative changes of the midfoot.    No cortical erosion, focal osteolysis, or periosteal reaction to suggest CT evidence of osteomyelitis.    Status post left below-the-knee amputation seen on  radiograph.    Mild skin thickening and subcutaneous edema of the right leg. Moderate subcutaneous edema of the dorsal foot as well as adjacent to the calcaneus. No rim-enhancing fluid collection identified.      Impression    IMPRESSION:  1.  No significant arterial stenosis or thrombosis in the right lower extremity. Three-vessel runoff to the foot.    2.  Large amount of stool in the distended rectum, with mild wall thickening and fat stranding. Correlate for fecal impaction and stercoral colitis.       Discharge Medications   Current Discharge Medication List        START taking these medications    Details    amoxicillin-clavulanate (AUGMENTIN) 875-125 MG tablet Take 1 tablet by mouth 2 times daily for 10 doses    Associated Diagnoses: Ulcer of right lower extremity, limited to breakdown of skin (H)           CONTINUE these medications which have CHANGED    Details   acetaminophen (TYLENOL) 500 MG tablet Take 2 tablets (1,000 mg) by mouth every 8 hours as needed for mild pain Pain 1-5/10    Associated Diagnoses: Ulcer of right lower extremity, limited to breakdown of skin (H)      insulin degludec (TRESIBA) 200 UNIT/ML pen Inject 40 Units subcutaneously every morning Hold for blood glucose less than 100    Associated Diagnoses: Type 2 diabetes mellitus with diabetic polyneuropathy, with long-term current use of insulin (H)      methadone (DOLOPHINE) 5 MG tablet Take 1 tablet (5 mg) by mouth 4 times daily 0700, 1100, 1500, 2000  Qty: 20 tablet, Refills: 0    Associated Diagnoses: Opioid dependence, uncomplicated (H)           CONTINUE these medications which have NOT CHANGED    Details   atorvastatin (LIPITOR) 40 MG tablet Take 1 tablet (40 mg) by mouth every evening    Associated Diagnoses: Hx of BKA, left (H)      melatonin 1 MG TABS tablet Take 1 tablet (1 mg) by mouth nightly as needed for sleep    Associated Diagnoses: Insomnia, unspecified type      Menthol, Topical Analgesic, (BIOFREEZE EX) Apply to neck and shoulders      miconazole (MICATIN) 2 % external powder Apply topically 2 times daily    Associated Diagnoses: Tinea corporis      Multiple Vitamin (TAB-A-YUN) TABS Take 1 tablet by mouth daily at 2 pm      ondansetron (ZOFRAN ODT) 4 MG ODT tab Take 1 tablet (4 mg) by mouth every 6 hours as needed for nausea or vomiting    Associated Diagnoses: Nausea      polyethylene glycol (MIRALAX) 17 GM/Dose powder Take 17 g by mouth 2 times daily as needed for constipation    Associated Diagnoses: Drug-induced constipation; Slow transit constipation      potassium chloride ER (K-TAB) 20 MEQ CR tablet Take 20 mEq by  mouth daily      senna-docusate (SENOKOT-S/PERICOLACE) 8.6-50 MG tablet Take 1 tablet by mouth 2 times daily as needed for constipation    Associated Diagnoses: Slow transit constipation; Drug-induced constipation      WARFARIN SODIUM PO 7/8 INR >8. Patient refused lab draw. Hold warfarin x 3 days and recheck INR.      Zinc oxide 10 % CREA Externally apply topically 3 times daily Apply to coccyx area once per shift after brief changes      mineral oil-hydrophilic petrolatum (AQUAPHOR) external ointment Apply to left thigh wound BID    Associated Diagnoses: Skin lesion           STOP taking these medications       cephALEXin (KEFLEX) 500 MG capsule Comments:   Reason for Stopping:         metolazone (ZAROXOLYN) 2.5 MG tablet Comments:   Reason for Stopping:         torsemide (DEMADEX) 20 MG tablet Comments:   Reason for Stopping:             Allergies   Allergies   Allergen Reactions    Prednisone Nausea and Vomiting    Morphine Nausea and Vomiting    Morphine [Fumaric Acid] Nausea and Vomiting    Nitrofurantoin Unknown     Per nursing home

## 2024-07-16 NOTE — PROGRESS NOTES
[facility] DISCHARGE NOTE    Patient discharged to transitional care unit ([facility] in [place]) via cart. Accompanied by other:EMS and staff. Discharge instructions reviewed with patient, opportunity offered to ask questions. Prescriptions sent with patient to fill. All belongings sent with patient.  Capri from [facility] was called and transfer discussed with her.

## 2024-07-16 NOTE — PHARMACY-ANTICOAGULATION SERVICE
Clinical Pharmacy- Warfarin Discharge Note  This patient is currently on warfarin for the treatment of Atrial fibrillation.  INR Goal= 2-3  Expected length of therapy lifetime.    Warfarin PTA Regimen: 3 mg daily      Anticoagulation Dose History  More data exists         Latest Ref Rng & Units 6/10/2024 7/11/2024 7/12/2024 7/13/2024 7/14/2024 7/15/2024 7/16/2024   Recent Dosing and Labs   warfarin ANTICOAGULANT (COUMADIN) 3 MG tablet - - 3 mg, $Given - - - 3 mg, $Given -   INR 0.85 - 1.15 4.83  2.96  4.21  1.59  1.76  3.30  3.51  1.40  1.42  1.46       Details          Multiple values from one day are sorted in reverse-chronological order               Vitamin K doses administered during the last 7 days: none  FFP administered during the last 7 days: none  Recommend discharging the patient on a warfarin regimen of 3 mg daily.    The patient should have an INR checked  in 1 week .     Liliana Botello, ElliD

## 2024-07-16 NOTE — PLAN OF CARE
Problem: Adult Inpatient Plan of Care  Goal: Plan of Care Review  Description: The Plan of Care Review/Shift note should be completed every shift.  The Outcome Evaluation is a brief statement about your assessment that the patient is improving, declining, or no change.  This information will be displayed automatically on your shift  note.  Outcome: Not Progressing  Flowsheets (Taken 7/16/2024 9882)  Outcome Evaluation: Patient continues to be noncompliant with cares. Patient refuses repositioning and will be angry and rude to staff when trying to convince her to roll to change her bedding that is wet from urine. Patient also accusing staff of stealing from her. Patient accused writer of stealing her rosary and writer found it in bed with her under her side. Patient cries in pain and requires a lot of motivation from staff.  Plan of Care Reviewed With: patient  Overall Patient Progress: no change     Problem: Pain Acute  Goal: Optimal Pain Control and Function  Outcome: Not Progressing

## 2024-07-16 NOTE — PLAN OF CARE
Occupational Therapy Discharge Summary    Reason for therapy discharge:    Discharged to transitional care facility.    Progress towards therapy goal(s). See goals on Care Plan in [facility] electronic health record for goal details.  Goals partially met.  Barriers to achieving goals:   discharge from facility.    Therapy recommendation(s):    Continued therapy is recommended.  Rationale/Recommendations:  Pt would benefit from continued skilled OT services in TCU to increase independence and safety with ADL.

## 2024-07-17 ENCOUNTER — PATIENT OUTREACH (OUTPATIENT)
Dept: CARE COORDINATION | Facility: CLINIC | Age: 79
End: 2024-07-17

## 2024-07-17 ENCOUNTER — DOCUMENTATION ONLY (OUTPATIENT)
Dept: WOUND CARE | Facility: CLINIC | Age: 79
End: 2024-07-17

## 2024-07-17 ENCOUNTER — ANTICOAGULATION THERAPY VISIT (OUTPATIENT)
Dept: ANTICOAGULATION | Facility: CLINIC | Age: 79
End: 2024-07-17

## 2024-07-17 ENCOUNTER — TRANSITIONAL CARE UNIT VISIT (OUTPATIENT)
Dept: GERIATRICS | Facility: CLINIC | Age: 79
End: 2024-07-17
Payer: COMMERCIAL

## 2024-07-17 ENCOUNTER — DOCUMENTATION ONLY (OUTPATIENT)
Dept: GERIATRICS | Facility: CLINIC | Age: 79
End: 2024-07-17

## 2024-07-17 VITALS
TEMPERATURE: 97.5 F | WEIGHT: 219.2 LBS | DIASTOLIC BLOOD PRESSURE: 72 MMHG | SYSTOLIC BLOOD PRESSURE: 123 MMHG | HEIGHT: 66 IN | RESPIRATION RATE: 20 BRPM | OXYGEN SATURATION: 98 % | BODY MASS INDEX: 35.23 KG/M2 | HEART RATE: 117 BPM

## 2024-07-17 DIAGNOSIS — G47.33 OSA (OBSTRUCTIVE SLEEP APNEA): Chronic | ICD-10-CM

## 2024-07-17 DIAGNOSIS — Z89.512 STATUS POST BELOW-KNEE AMPUTATION OF LEFT LOWER EXTREMITY (H): ICD-10-CM

## 2024-07-17 DIAGNOSIS — I48.20 CHRONIC ATRIAL FIBRILLATION (H): Primary | Chronic | ICD-10-CM

## 2024-07-17 DIAGNOSIS — F11.20 OPIOID DEPENDENCE, UNCOMPLICATED (H): ICD-10-CM

## 2024-07-17 DIAGNOSIS — M79.7 FIBROMYALGIA: ICD-10-CM

## 2024-07-17 DIAGNOSIS — I50.812 CHRONIC RIGHT-SIDED HEART FAILURE (H): ICD-10-CM

## 2024-07-17 DIAGNOSIS — I89.0 LYMPHEDEMA: Primary | ICD-10-CM

## 2024-07-17 DIAGNOSIS — I48.20 CHRONIC ATRIAL FIBRILLATION (H): ICD-10-CM

## 2024-07-17 DIAGNOSIS — Z79.4 TYPE 2 DIABETES MELLITUS WITH DIABETIC POLYNEUROPATHY, WITH LONG-TERM CURRENT USE OF INSULIN (H): ICD-10-CM

## 2024-07-17 DIAGNOSIS — L03.115 CELLULITIS OF RIGHT LOWER EXTREMITY: ICD-10-CM

## 2024-07-17 DIAGNOSIS — L97.909 VENOUS ULCER (H): ICD-10-CM

## 2024-07-17 DIAGNOSIS — I83.009 VENOUS ULCER (H): ICD-10-CM

## 2024-07-17 DIAGNOSIS — E11.42 TYPE 2 DIABETES MELLITUS WITH DIABETIC POLYNEUROPATHY, WITH LONG-TERM CURRENT USE OF INSULIN (H): ICD-10-CM

## 2024-07-17 DIAGNOSIS — G89.4 CHRONIC PAIN SYNDROME: Chronic | ICD-10-CM

## 2024-07-17 DIAGNOSIS — E78.2 MIXED HYPERLIPIDEMIA: Chronic | ICD-10-CM

## 2024-07-17 LAB
ALBUMIN SERPL BCG-MCNC: 2.7 G/DL (ref 3.5–5.2)
ALP SERPL-CCNC: 74 U/L (ref 40–150)
ALT SERPL W P-5'-P-CCNC: 12 U/L (ref 0–50)
ANION GAP SERPL CALCULATED.3IONS-SCNC: 6 MMOL/L (ref 7–15)
AST SERPL W P-5'-P-CCNC: 24 U/L (ref 0–45)
BASOPHILS # BLD AUTO: 0 10E3/UL (ref 0–0.2)
BASOPHILS NFR BLD AUTO: 1 %
BILIRUB SERPL-MCNC: 0.4 MG/DL
BUN SERPL-MCNC: 14.3 MG/DL (ref 8–23)
CALCIUM SERPL-MCNC: 9.1 MG/DL (ref 8.8–10.4)
CHLORIDE SERPL-SCNC: 103 MMOL/L (ref 98–107)
CREAT SERPL-MCNC: 0.89 MG/DL (ref 0.51–0.95)
EGFRCR SERPLBLD CKD-EPI 2021: 66 ML/MIN/1.73M2
EOSINOPHIL # BLD AUTO: 0.4 10E3/UL (ref 0–0.7)
EOSINOPHIL NFR BLD AUTO: 6 %
ERYTHROCYTE [DISTWIDTH] IN BLOOD BY AUTOMATED COUNT: 16.3 % (ref 10–15)
GLUCOSE SERPL-MCNC: 149 MG/DL (ref 70–99)
HCO3 SERPL-SCNC: 29 MMOL/L (ref 22–29)
HCT VFR BLD AUTO: 29.5 % (ref 35–47)
HGB BLD-MCNC: 9.4 G/DL (ref 11.7–15.7)
IMM GRANULOCYTES # BLD: 0 10E3/UL
IMM GRANULOCYTES NFR BLD: 1 %
INR (EXTERNAL): 1.6 (ref 0.9–1.1)
LYMPHOCYTES # BLD AUTO: 1.6 10E3/UL (ref 0.8–5.3)
LYMPHOCYTES NFR BLD AUTO: 22 %
MCH RBC QN AUTO: 29.9 PG (ref 26.5–33)
MCHC RBC AUTO-ENTMCNC: 31.9 G/DL (ref 31.5–36.5)
MCV RBC AUTO: 94 FL (ref 78–100)
MONOCYTES # BLD AUTO: 0.5 10E3/UL (ref 0–1.3)
MONOCYTES NFR BLD AUTO: 7 %
NEUTROPHILS # BLD AUTO: 4.7 10E3/UL (ref 1.6–8.3)
NEUTROPHILS NFR BLD AUTO: 63 %
NRBC # BLD AUTO: 0 10E3/UL
NRBC BLD AUTO-RTO: 0 /100
PLATELET # BLD AUTO: 216 10E3/UL (ref 150–450)
POTASSIUM SERPL-SCNC: 4 MMOL/L (ref 3.4–5.3)
PROT SERPL-MCNC: 7.7 G/DL (ref 6.4–8.3)
RBC # BLD AUTO: 3.14 10E6/UL (ref 3.8–5.2)
SODIUM SERPL-SCNC: 138 MMOL/L (ref 135–145)
WBC # BLD AUTO: 7.3 10E3/UL (ref 4–11)

## 2024-07-17 PROCEDURE — 36415 COLL VENOUS BLD VENIPUNCTURE: CPT | Mod: ORL | Performed by: NURSE PRACTITIONER

## 2024-07-17 PROCEDURE — 80053 COMPREHEN METABOLIC PANEL: CPT | Mod: ORL | Performed by: NURSE PRACTITIONER

## 2024-07-17 PROCEDURE — P9604 ONE-WAY ALLOW PRORATED TRIP: HCPCS | Mod: ORL | Performed by: NURSE PRACTITIONER

## 2024-07-17 PROCEDURE — 85025 COMPLETE CBC W/AUTO DIFF WBC: CPT | Mod: ORL | Performed by: NURSE PRACTITIONER

## 2024-07-17 PROCEDURE — 99310 SBSQ NF CARE HIGH MDM 45: CPT | Performed by: NURSE PRACTITIONER

## 2024-07-17 NOTE — PROGRESS NOTES
[facility]: [facility] [place]    Background: Transitional Care Management program identified per system criteria and reviewed by [facility] team for possible outreach.    Assessment: Upon chart review, CCRC Team member will not proceed with patient outreach related to this episode of Transitional Care Management program due to reason below:    Non-[facility] TCU: CCRC team member noted patient discharged to TCU/ARU/LTACH. Patient is not established with a [facility] currently supported by Primary Care-Care Coordination therefore handoff to Primary Care-Care Coordination is not appropriate at this time.    Plan: Transitional Care Management episode addressed appropriately per reason noted above.      Katie Villarreal  Community Health Worker  [facility], [facility]  [phone]      *Connected Care Resource Team does NOT follow patient ongoing. Referrals are identified based on internal discharge reports and the outreach is to ensure patient has an understanding of their discharge instructions.

## 2024-07-17 NOTE — PROGRESS NOTES
[facility] GERIATRIC SERVICES    Code Status:  FULL CODE   Visit Type:   Chief Complaint   Patient presents with    TCU Admission     [facility] 7/10/2024 - 7/16/2024     Facility:  [facility] ([facility]) [[id number]]         HPI: Linda Loredo is a 79 year old female who I am seeing today for admit to the TCU. 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  Patient recently hospitalized with altered mental status.  Patient previously living at [facility].  Patient found to have right lower extremity cellulitis secondary to a large venous stasis ulcer from chronic lymphedema.  Patient treated with IV Zosyn.  Blood cultures x 2 shows no growth.  She did undergo debridement in the OR on 7/14/2024.  CT angiogram of the right lower extremity without evidence for arterial stenosis.  UTI.  Antibiotics complete.    Transitional Care Course: Today patient sitting up in wheelchair.  She has a friend present on exam.  Patient with history of chronic lymphedema.  She was being seen outpatient by lymphedema therapist.  She reports having a small open area on the back of her right leg that got larger at her last assisted living facility.  She continues on topical treatment.  She was seen by the house wound MD today.  She did show me a picture on her phone.  Large area with granulation present.  Moderate serosanguineous drainage.  No redness or warmth.  She does have large extremities with 2+ edema.  She is currently off of her diuretics secondary to acute on chronic kidney disease.  Creatinine up to 1.9 during hospitalization however at discharge was 1.03.  She denies  any shortness of breath no chest pain.  She does have underlying congestive heart failure.  UTI.  She has completed her oral antibiotics.  She denies any dysuria frequency burning or pain.  Diabetes mellitus type 2.  She is noncompliant with diabetic diet.  Today she was eating [employer].  She continues on Tresiba.  Chronic anemia on iron.  A-fib on chronic anticoagulation.  She denies any chest pain.  History of left BKA in 2023.  She is able to transfer from bed to chair and from wheelchair to the toilet.  Today she tells me she received a custom wheelchair at a previous nursing home however feels it is inadequate.  History of chronic pain syndrome/fibromyalgia on chronic methadone.      Assessment/Plan:      -Right lower extremity cellulitis  -Right lower extremity wounds of mixed etiology including venous stasis impressions.  -Coccyx Ulcer  -MRI of the right foot negative for osteomyelitis.  -Patient had been treated outpatient on Keflex 7/3 with no improvement.    -During hospitalization patient treated with IV Zosyn for elevated white count and CRP.   -Continues Augmentin twice daily x 10 days.  Stop date 7/21.  -ABIs attempted on 7/12/2024 -  ALMA cannot be obtained due to noncompressible vessels. Monophasic waveforms on the right suggests arterial disease which could be further evaluated with duplex ultrasound     Right lower extremity arterial duplex with sluggish arterial flow with no focal stenosis.    MRI right foot shows no evidence of osteomyelitis, septic arthropathy, or abscess.  -Blood cultures x 2 from 7/11 shows no growth x 3 days  -Patient status post I&D on 7/14/2024  -CT angiogram right lower extremity without evidence for arterial stenoses  -Continues with topical treatment to both right lower extremity and coccyx.  -Ok for House Wound MD to consult.     Chronic Lymphedema   -Add Ace wrap to RLE over dressings.   -Pt has custom compression wraps to be resumed once wounds are healed.  -Currently  diuretics on hold.     Urinary tract infection  Recently hospitalized 6/6-10/24 for sepsis secondary to cellulitis of right lower extremity with 1/1 blood cultures positive for pan-sensitive S. dysgalactiae. Repeat BCX 6/7 were negative. She also had a UTI and urine culture grew providencia rettgeri - resistant to zosyn/unasyn,  susceptible to rocephin. She was treated with zosyn and vanco, changed to ceftriaxone and discharged to a TCU with ceftriaxone for 2 weeks.  -UC from 7/11 <10,000 urogenital danny.   -Completed course of Zosyn during hospitalization  -Denies dysuria.      GUSTAVO on CKD stage 3  -Baseline Cr 1.0- 1.1. Hospital 2.0< 1.03.   -Diuretics held during hospitalization continue to be held.  -Creatinine 0.89.  -Okay to resume torsemide.      Atrial fibrillation, likely permanent   -New onset during 5/2022 admission   -EKG 7/10/2024 - fib - flutter, rate controlled  -Chronically on warfarin.  -Coumadin clinic to manage INRs.  -INR today 1.6.     Anemia, normocytic/chronic   -Admission HGB 10.3. Baseline mostly 10-10.5.  -B12 1349. Ferritin 189. %Fe sat 30   -Repeat CBC today with hgb 9.4.      Type 2 Diabetes:   Last A1c 9.9 3/2024  -continueTresiba 40 units Q AM.   -consistent carb diet, 2 gm sodium   -Blood sugar checks QID.      Chronic Right Sided Heart failure'   Pulmonary hypertension by echo and mild to moderate pulmonary hypertension by angiogram 2006  Chronic lower extremities edema  Venous stasis   -Echo 5/2022 [facility] - Left Ventricle Normal left ventricular chamber size. Normal left ventricular systolic function. Calculated left ventricular ejection  fraction (modified Pan technique) is 69 %. No regional wall motion abnormalities. Mildly increased left ventricular wall thickness. Indeterminate left ventricular diastolic function. Right ventricle was not well visualized. Estimated right ventricular systolic pressure is 34.3 mmHg plus right atrial pressure. Estimated right ventricular  systolic pressure is 47.6 mmHg. Normal right ventricular systolic function. Mild mitral valve regurgitation. Mild tricuspid valve regurgitation.   -Resume torsemide 20 mg daily today.   -Previously on metolazone 2.5 mg 3 times weekly.  Monitor weights, edema and shortness of breath or resuming.  -Daily weights.  -2 g sodium diet.     Obstructive sleep apnea  -Sleep study 2004 reportedly showed severe obstructive sleep apnea and recommend CPAP  -Currently not on CPAP.     Hypercalcemia   Primary hyperparathyroidism per problem list  -Work up January 2013 Dr. Villareal, juan. Hypercalcemia with elevated PTH.   -follow up out pt.      S/p left below-knee amputation 10/2023  -has prosthetic but does not wear it. States it is too big and has caused sores.   -consult prosthetic company for adjustments.      Fibromyalgia  Opioid dependence  -Continue with methadone 5 mg QID.   -Continue Biofreeze.      History of hypertension  -resume torsemide.      Hyperlipidemia  -Continue atorvastatin 40 mg daily.    -ok to PT, OT to eval and treat.     Active Ambulatory Problems     Diagnosis Date Noted    Type 2 diabetes mellitus with complication, with long-term current use of insulin (H) 04/18/2005    Essential hypertension with goal blood pressure less than 140/90 05/09/2005    Mixed stress and urge urinary incontinence 05/09/2005    Insomnia 07/15/2005    Mixed hyperlipidemia 12/05/2005    Obesity with BMI >35 and comorbidity 03/13/2006    Diabetic polyneuropathy associated with type 2 diabetes mellitus (H) 04/07/2006    Osteoarthritis 09/18/2006    BERLIN (obstructive sleep apnea) 10/23/2007    Fibromyalgia 10/20/2011    Allergic rhinitis 04/08/2008    Anticoagulation monitoring, INR range 2-3 07/06/2022    Microalbuminuria due to type 2 diabetes mellitus (H) 10/25/2016    Chronic atrial fibrillation (H) 05/30/2022    Osteopenia 05/20/2015    Vitamin D deficiency 09/08/2022    Umbilical hernia without obstruction and without  gangrene 12/13/2018    Primary hyperparathyroidism (H24) 01/23/2013    Chronic pain 04/26/2010    Opioid dependence, uncomplicated (H) 04/26/2023    Chronic right-sided heart failure (H) 11/16/2023    Cellulitis of right lower extremity 06/06/2024    Chronic kidney disease, stage 3b (H) 06/12/2024    Cardiac murmur, unspecified 05/29/2019    Hx of osteomyelitis 04/16/2024    Lymphedema, not elsewhere classified 02/09/2024    Constipation 04/16/2024    Other abnormalities of gait and mobility 11/15/2023    Venous stasis 04/16/2024    Acute kidney injury (H24) 07/11/2024    Sepsis, due to unspecified organism, unspecified whether acute organ dysfunction present (H) 07/11/2024    Metabolic encephalopathy 07/11/2024    Pressure injury of deep tissue of sacral region 07/11/2024    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) 07/11/2024    Anemia 07/11/2024    Pulmonary hypertension (H) 07/11/2024    Acute cystitis without hematuria 07/11/2024    Ulcer of right lower extremity (H) 07/12/2024     Resolved Ambulatory Problems     Diagnosis Date Noted    DEPRESSION 05/09/2005    Herpes zoster 06/20/2005    Anxiety 10/20/2011    Chronic kidney disease 10/20/2011    Elevated liver enzymes 10/20/2011    Fracture of fibula, distal, left, closed 10/20/2011    Hypotension 10/27/2011    Abscess of left foot 10/31/2022    Diabetic ulcer of left heel associated with type 2 diabetes mellitus, with muscle involvement without evidence of necrosis (H) 05/17/2022    Major depressive disorder, recurrent episode, moderate (H) 04/08/2008    Mixed hyperlipidemia due to type 2 diabetes mellitus (H) 04/08/2008    Venous stasis ulcers of both lower extremities (H) 01/13/2022    Cellulitis of buttock 05/26/2023    Sepsis without acute organ dysfunction, due to unspecified organism (H) 05/26/2023    Sepsis (H) 05/26/2023    UTI (urinary tract infection) - possible 05/26/2023    GUSTAVO (acute kidney injury) (H24) 05/26/2023     Dehydration 05/26/2023    Cellulitis - bilateral lower extremities 05/27/2023    Decubitus ulcers - 5 present on buttocks/perineal region, numerous scabbed over and unstagable on shin and bilateral feet with some bloody blisters noted on feet 05/27/2023    Warfarin-induced coagulopathy (H24) 05/27/2023    Hypoglycemia 05/27/2023    Supratherapeutic INR 05/27/2023    Non-healing wound of left heel 09/22/2023    Diabetic foot ulcer (H) 09/29/2023    Osteomyelitis (H) 10/24/2023    Pressure injury of right buttock, unstageable (H) 01/03/2024    Skin ulcer of right lower leg with fat layer exposed (H) 02/26/2024    Adverse effect of anticoagulants, initial encounter 04/16/2024    Muscle weakness (generalized) 11/15/2023    Nausea 11/15/2023    Other specified disorders of bone density and structure, unspecified site 05/20/2015    Other injury of unspecified body region, subsequent encounter 11/15/2023    Unspecified open wound, left foot, subsequent encounter 09/22/2023     Past Medical History:   Diagnosis Date    Herpes zoster without mention of complication     Hypercalcemia 02/24/2007    Mild intermittent asthma     Other urinary incontinence     Type II or unspecified type diabetes mellitus with renal manifestations, uncontrolled(250.42) (H)     Type II or unspecified type diabetes mellitus without mention of complication, not stated as uncontrolled     Unspecified essential hypertension      Allergies   Allergen Reactions    Prednisone Nausea and Vomiting    Morphine Nausea and Vomiting    Morphine [Fumaric Acid] Nausea and Vomiting    Nitrofurantoin Unknown     Per nursing home       All Meds and Allergies reviewed in the record at the facility and is the most up-to-date.    Post Discharge Medication Reconciliation Status: discharge medications reconciled and changed, per note/orders  Current Outpatient Medications   Medication Sig Dispense Refill    acetaminophen (TYLENOL) 500 MG tablet Take 2 tablets (1,000 mg)  "by mouth every 8 hours as needed for mild pain Pain 1-5/10      amoxicillin-clavulanate (AUGMENTIN) 875-125 MG tablet Take 1 tablet by mouth 2 times daily for 10 doses      atorvastatin (LIPITOR) 40 MG tablet Take 1 tablet (40 mg) by mouth every evening      insulin degludec (TRESIBA) 200 UNIT/ML pen Inject 40 Units subcutaneously every morning Hold for blood glucose less than 100      melatonin 1 MG TABS tablet Take 1 tablet (1 mg) by mouth nightly as needed for sleep      methadone (DOLOPHINE) 5 MG tablet Take 1 tablet (5 mg) by mouth 4 times daily 0700, 1100, 1500, 2000 20 tablet 0    miconazole (MICATIN) 2 % external powder Apply topically 2 times daily      mineral oil-hydrophilic petrolatum (AQUAPHOR) external ointment Apply to left thigh wound BID      Multiple Vitamin (TAB-A-YUN) TABS Take 1 tablet by mouth daily at 2 pm      ondansetron (ZOFRAN ODT) 4 MG ODT tab Take 1 tablet (4 mg) by mouth every 6 hours as needed for nausea or vomiting      polyethylene glycol (MIRALAX) 17 GM/Dose powder Take 17 g by mouth 2 times daily as needed for constipation      potassium chloride ER (K-TAB) 20 MEQ CR tablet Take 20 mEq by mouth daily      senna-docusate (SENOKOT-S/PERICOLACE) 8.6-50 MG tablet Take 1 tablet by mouth 2 times daily as needed for constipation      WARFARIN SODIUM PO Take by mouth daily Dosing in coordination with INR results      Zinc oxide 10 % CREA Externally apply topically 3 times daily Apply to coccyx area once per shift after brief changes      Menthol, Topical Analgesic, (BIOFREEZE EX) Apply to neck and shoulders (Patient not taking: Reported on 7/17/2024)       No current facility-administered medications for this visit.       REVIEW OF SYSTEMS:   10 point review of systems reviewed and pertinent positives in the HPI.     PHYSICAL EXAMINATION:  Physical Exam     Vital signs: /72   Pulse 117   Temp 97.5  F (36.4  C)   Resp 20   Ht 1.676 m (5' 6\")   Wt 99.4 kg (219 lb 3.2 oz)   SpO2 " 98%   BMI 35.38 kg/m    General: Awake, Alert, oriented x3, sitting up in wheelchair, appropriately, follows simple commands, conversant  HEENT:Pink conjunctiva, moist oral mucosa  NECK: Supple  CVS:  S1  S2, without murmur or gallop.   LUNG: Clear to auscultation, No wheezes, rales or rhonci.  BACK: No kyphosis of the thoracic spine  ABDOMEN: Soft, obese, nontender to palpation, with positive bowel sounds  EXTREMITIES: Moves both upper and lower extremities. Left BKA, chronic lymphedema to RLE. 2+ edema to RLE.   SKIN: Coccyx ulcer not observed. RLE with dressing dry and intact.   NEUROLOGIC: Intact, pulses palpable  PSYCHIATRIC: Cognition intact      Labs:  All labs reviewed in the nursing home record and Epic   @  Lab Results   Component Value Date    WBC 7.3 07/17/2024    WBC 9.4 02/22/2008     Lab Results   Component Value Date    RBC 3.14 07/17/2024    RBC 4.19 02/22/2008     Lab Results   Component Value Date    HGB 9.4 07/17/2024    HGB 12.6 02/22/2008     Lab Results   Component Value Date    HCT 29.5 07/17/2024    HCT 35.6 02/22/2008     Lab Results   Component Value Date    MCV 94 07/17/2024    MCV 85 02/22/2008     Lab Results   Component Value Date    MCH 29.9 07/17/2024    MCH 30.1 02/22/2008     Lab Results   Component Value Date    MCHC 31.9 07/17/2024    MCHC 35.4 02/22/2008     Lab Results   Component Value Date    RDW 16.3 07/17/2024    RDW Test not performed 02/22/2008     Lab Results   Component Value Date     07/17/2024     02/22/2008        @Last Comprehensive Metabolic Panel:  Sodium   Date Value Ref Range Status   07/16/2024 138 135 - 145 mmol/L Final   02/22/2008 140 133 - 144 mmol/L Final     Potassium   Date Value Ref Range Status   07/16/2024 3.5 3.4 - 5.3 mmol/L Final   06/28/2022 4.3 3.5 - 5.0 mmol/L Final   02/22/2008 4.5 3.4 - 5.3 mmol/L Final     Chloride   Date Value Ref Range Status   07/16/2024 102 98 - 107 mmol/L Final   06/28/2022 97 (L) 98 - 107 mmol/L Final    02/22/2008 100 94 - 109 mmol/L Final     Carbon Dioxide   Date Value Ref Range Status   02/22/2008 28 20 - 32 mmol/L Final     Carbon Dioxide (CO2)   Date Value Ref Range Status   07/16/2024 27 22 - 29 mmol/L Final   06/28/2022 29 22 - 31 mmol/L Final     Anion Gap   Date Value Ref Range Status   07/16/2024 9 7 - 15 mmol/L Final   06/28/2022 13 5 - 18 mmol/L Final   02/22/2008 11 6 - 17 mmol/L Final     Glucose   Date Value Ref Range Status   06/28/2022 73 70 - 125 mg/dL Final   02/22/2008 298 (H) 60 - 99 mg/dL Final     GLUCOSE BY METER POCT   Date Value Ref Range Status   07/16/2024 301 (H) 70 - 99 mg/dL Final     Comment:     Dr/RN Notified     Urea Nitrogen   Date Value Ref Range Status   07/16/2024 19.4 8.0 - 23.0 mg/dL Final   06/28/2022 42 (H) 8 - 28 mg/dL Final   02/22/2008 15 7 - 30 mg/dL Final     Creatinine   Date Value Ref Range Status   07/16/2024 1.03 (H) 0.51 - 0.95 mg/dL Final   02/22/2008 0.82 0.60 - 1.30 mg/dL Final     GFR Estimate   Date Value Ref Range Status   07/16/2024 55 (L) >60 mL/min/1.73m2 Final     Comment:     eGFR calculated using 2021 CKD-EPI equation.   02/22/2008 75 >60 mL/min/1.7m2 Final     Calcium   Date Value Ref Range Status   07/16/2024 8.6 (L) 8.8 - 10.2 mg/dL Final   02/22/2008 10.4 8.5 - 10.4 mg/dL Final     Greater than 45 minutes spent preparing for this visit, reviewing hospitalization records reviewing multiple hospitalizations, medications, labs, imaging as well as face-to-face time spent with patient and collaborating with the nursing staff and wound doctor.    This note has been dictated using voice recognition software. Any grammatical or context distortions are unintentional and inherent to the software    Electronically signed by: Abby Jacinto CNP

## 2024-07-17 NOTE — PROGRESS NOTES
Vascular Referral Intake    Referred by: MATTHIEU Hayes (saw pt inpatient in [place], general surgery debrided right calf wound) for right leg wounds, buttocks wounds    Specialty: Wound Clinic    Specific Provider if Necessary:  MD Luis E Morley    Visit Type: Return    Time Frame: Next Available    Testing/Imaging Needed Before Consult: none (recent imaging but was done in [place] so not as accurate as [employer])    Appt Note: (to be pasted into appt comments, also add where additional information is located, ie, outside images pushed to PACS, in Epic, [facility], etc) JERMAIN, NEEDS BED; NO BEDS BACK TO BACK; NO MORE THAN 4 BEDS/DAY; NEEDS 40 MIN APPOINTMENT!

## 2024-07-17 NOTE — PROGRESS NOTES
Care Management Manager (CCM) was given a message from staff stating that EMS refused to take the pt's wheelchair at discharge. CMM reviewed the notes and found that per RN note at Discharge the pt was discharged with all belongings. CMM called TCU admissions and spoke with Susana who transferred the CMM to the RN Tyra who was able to confirm that the Pt has their personal wheelchair with them. CMM to follow up with Transport to clarify the messaging.

## 2024-07-17 NOTE — PROGRESS NOTES
ANTICOAGULATION MANAGEMENT     Linda Loredo 79 year old female is on warfarin with subtherapeutic INR result. (Goal INR 2.0-3.0)    Recent labs: (last 7 days)     07/17/24  1538   INR 1.6*       ASSESSMENT     Source(s): Chart Review and Home Care/Facility Nurse    Warfarin doses taken: While hospitalized on 07/10-07/16: anticoagulation calendar updated with inpatient dosing.  Discharged on: 3 mg daily. 2 mg vitamin K given 7/13/2024  Diet:  unsure of baseline   Medication/supplement changes:  discharged on augmentin with end date of 7/21/2024  New illness, injury, or hospitalization: Yes: hospitalized, for wound and encephalopathy  Signs or symptoms of bleeding or clotting: No  Previous result: Subtherapeutic  Additional findings: None       PLAN     Recommended plan for temporary change(s) affecting INR     Dosing Instructions: Continue your current warfarin dose with next INR in 5 days       Summary  As of 7/17/2024      Full warfarin instructions:  3 mg every day   Next INR check:  7/22/2024               Telephone call with [facility] nurse (medical care for seniors) who agrees to plan and repeated back plan correctly    Orders given to  Homecare nurse/facility to recheck    Education provided: None required    Plan made with [facility] Pharmacist Capri Espinoza, RN  Anticoagulation Clinic  7/17/2024    _______________________________________________________________________     Anticoagulation Episode Summary       Current INR goal:  2.0-3.0   TTR:  --   Target end date:  Indefinite   Send INR reminders to:  [facility] (TCU/LTC/TOMMY)    Indications    Chronic atrial fibrillation (H) [I48.20]             Comments:               Anticoagulation Care Providers       Provider Role Specialty Phone number    Abby Jacinto NP Referring Nurse Practitioner [phone]

## 2024-07-17 NOTE — LETTER
7/17/2024      Linda Loredo  [address] [address]        [facility]    Code Status:  FULL CODE   Visit Type:   Chief Complaint   Patient presents with     TCU Admission     LEONEL Monsivais 7/10/2024 - 7/16/2024     Facility:  [facility] ([facility]) [[id number]]         HPI: Linda Loredo is a 79 year old female who I am seeing today for admit to the TCU. 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  Patient recently hospitalized with altered mental status.  Patient previously living at [facility].  Patient found to have right lower extremity cellulitis secondary to a large venous stasis ulcer from chronic lymphedema.  Patient treated with IV Zosyn.  Blood cultures x 2 shows no growth.  She did undergo debridement in the OR on 7/14/2024.  CT angiogram of the right lower extremity without evidence for arterial stenosis.  UTI.  Antibiotics complete.    Transitional Care Course: Today patient sitting up in wheelchair.  She has a friend present on exam.  Patient with history of chronic lymphedema.  She was being seen outpatient by lymphedema therapist.  She reports having a small open area on the back of her right leg that got larger at her last assisted living facility.  She continues on topical treatment.  She was seen by the house wound MD today.  She did show me a picture on her phone.  Large area with granulation present.  Moderate serosanguineous drainage.  No redness or warmth.  She does have large extremities with 2+ edema.  She is currently off of her diuretics secondary to acute on chronic kidney  disease.  Creatinine up to 1.9 during hospitalization however at discharge was 1.03.  She denies any shortness of breath no chest pain.  She does have underlying congestive heart failure.  UTI.  She has completed her oral antibiotics.  She denies any dysuria frequency burning or pain.  Diabetes mellitus type 2.  She is noncompliant with diabetic diet.  Today she was eating [employer].  She continues on Tresiba.  Chronic anemia on iron.  A-fib on chronic anticoagulation.  She denies any chest pain.  History of left BKA in 2023.  She is able to transfer from bed to chair and from wheelchair to the toilet.  Today she tells me she received a custom wheelchair at a previous nursing home however feels it is inadequate.  History of chronic pain syndrome/fibromyalgia on chronic methadone.      Assessment/Plan:      -Right lower extremity cellulitis  -Right lower extremity wounds of mixed etiology including venous stasis impressions.  -Coccyx Ulcer  -MRI of the right foot negative for osteomyelitis.  -Patient had been treated outpatient on Keflex 7/3 with no improvement.    -During hospitalization patient treated with IV Zosyn for elevated white count and CRP.   -Continues Augmentin twice daily x 10 days.  Stop date 7/21.  -ABIs attempted on 7/12/2024 -  ALMA cannot be obtained due to noncompressible vessels. Monophasic waveforms on the right suggests arterial disease which could be further evaluated with duplex ultrasound     Right lower extremity arterial duplex with sluggish arterial flow with no focal stenosis.    MRI right foot shows no evidence of osteomyelitis, septic arthropathy, or abscess.  -Blood cultures x 2 from 7/11 shows no growth x 3 days  -Patient status post I&D on 7/14/2024  -CT angiogram right lower extremity without evidence for arterial stenoses  -Continues with topical treatment to both right lower extremity and coccyx.  -Ok for House Wound MD to consult.     Chronic Lymphedema   -Add Ace wrap to RLE over  dressings.   -Pt has custom compression wraps to be resumed once wounds are healed.  -Currently diuretics on hold.     Urinary tract infection  Recently hospitalized 6/6-10/24 for sepsis secondary to cellulitis of right lower extremity with 1/1 blood cultures positive for pan-sensitive S. dysgalactiae. Repeat BCX 6/7 were negative. She also had a UTI and urine culture grew providencia rettgeri - resistant to zosyn/unasyn,  susceptible to rocephin. She was treated with zosyn and vanco, changed to ceftriaxone and discharged to a TCU with ceftriaxone for 2 weeks.  -UC from 7/11 <10,000 urogenital danny.   -Completed course of Zosyn during hospitalization  -Denies dysuria.      GUSTAVO on CKD stage 3  -Baseline Cr 1.0- 1.1. Hospital 2.0< 1.03.   -Diuretics held during hospitalization continue to be held.  -Creatinine 0.89.  -Okay to resume torsemide.      Atrial fibrillation, likely permanent   -New onset during 5/2022 admission   -EKG 7/10/2024 - fib - flutter, rate controlled  -Chronically on warfarin.  -Coumadin clinic to manage INRs.  -INR today 1.6.     Anemia, normocytic/chronic   -Admission HGB 10.3. Baseline mostly 10-10.5.  -B12 1349. Ferritin 189. %Fe sat 30   -Repeat CBC today with hgb 9.4.      Type 2 Diabetes:   Last A1c 9.9 3/2024  -continueTresiba 40 units Q AM.   -consistent carb diet, 2 gm sodium   -Blood sugar checks QID.      Chronic Right Sided Heart failure'   Pulmonary hypertension by echo and mild to moderate pulmonary hypertension by angiogram 2006  Chronic lower extremities edema  Venous stasis   -Echo 5/2022 [facility] - Left Ventricle Normal left ventricular chamber size. Normal left ventricular systolic function. Calculated left ventricular ejection  fraction (modified Pan technique) is 69 %. No regional wall motion abnormalities. Mildly increased left ventricular wall thickness. Indeterminate left ventricular diastolic function. Right ventricle was not well visualized. Estimated right  ventricular systolic pressure is 34.3 mmHg plus right atrial pressure. Estimated right ventricular systolic pressure is 47.6 mmHg. Normal right ventricular systolic function. Mild mitral valve regurgitation. Mild tricuspid valve regurgitation.   -Resume torsemide 20 mg daily today.   -Previously on metolazone 2.5 mg 3 times weekly.  Monitor weights, edema and shortness of breath or resuming.  -Daily weights.  -2 g sodium diet.     Obstructive sleep apnea  -Sleep study 2004 reportedly showed severe obstructive sleep apnea and recommend CPAP  -Currently not on CPAP.     Hypercalcemia   Primary hyperparathyroidism per problem list  -Work up January 2013 Dr. Villareal, endo. Hypercalcemia with elevated PTH.   -follow up out pt.      S/p left below-knee amputation 10/2023  -has prosthetic but does not wear it. States it is too big and has caused sores.   -consult prosthetic company for adjustments.      Fibromyalgia  Opioid dependence  -Continue with methadone 5 mg QID.   -Continue Biofreeze.      History of hypertension  -resume torsemide.      Hyperlipidemia  -Continue atorvastatin 40 mg daily.    -ok to PT, OT to eval and treat.     Active Ambulatory Problems     Diagnosis Date Noted     Type 2 diabetes mellitus with complication, with long-term current use of insulin (H) 04/18/2005     Essential hypertension with goal blood pressure less than 140/90 05/09/2005     Mixed stress and urge urinary incontinence 05/09/2005     Insomnia 07/15/2005     Mixed hyperlipidemia 12/05/2005     Obesity with BMI >35 and comorbidity 03/13/2006     Diabetic polyneuropathy associated with type 2 diabetes mellitus (H) 04/07/2006     Osteoarthritis 09/18/2006     BERLIN (obstructive sleep apnea) 10/23/2007     Fibromyalgia 10/20/2011     Allergic rhinitis 04/08/2008     Anticoagulation monitoring, INR range 2-3 07/06/2022     Microalbuminuria due to type 2 diabetes mellitus (H) 10/25/2016     Chronic atrial fibrillation (H) 05/30/2022      Osteopenia 05/20/2015     Vitamin D deficiency 09/08/2022     Umbilical hernia without obstruction and without gangrene 12/13/2018     Primary hyperparathyroidism (H24) 01/23/2013     Chronic pain 04/26/2010     Opioid dependence, uncomplicated (H) 04/26/2023     Chronic right-sided heart failure (H) 11/16/2023     Cellulitis of right lower extremity 06/06/2024     Chronic kidney disease, stage 3b (H) 06/12/2024     Cardiac murmur, unspecified 05/29/2019     Hx of osteomyelitis 04/16/2024     Lymphedema, not elsewhere classified 02/09/2024     Constipation 04/16/2024     Other abnormalities of gait and mobility 11/15/2023     Venous stasis 04/16/2024     Acute kidney injury (H24) 07/11/2024     Sepsis, due to unspecified organism, unspecified whether acute organ dysfunction present (H) 07/11/2024     Metabolic encephalopathy 07/11/2024     Pressure injury of deep tissue of sacral region 07/11/2024     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) 07/11/2024     Anemia 07/11/2024     Pulmonary hypertension (H) 07/11/2024     Acute cystitis without hematuria 07/11/2024     Ulcer of right lower extremity (H) 07/12/2024     Resolved Ambulatory Problems     Diagnosis Date Noted     DEPRESSION 05/09/2005     Herpes zoster 06/20/2005     Anxiety 10/20/2011     Chronic kidney disease 10/20/2011     Elevated liver enzymes 10/20/2011     Fracture of fibula, distal, left, closed 10/20/2011     Hypotension 10/27/2011     Abscess of left foot 10/31/2022     Diabetic ulcer of left heel associated with type 2 diabetes mellitus, with muscle involvement without evidence of necrosis (H) 05/17/2022     Major depressive disorder, recurrent episode, moderate (H) 04/08/2008     Mixed hyperlipidemia due to type 2 diabetes mellitus (H) 04/08/2008     Venous stasis ulcers of both lower extremities (H) 01/13/2022     Cellulitis of buttock 05/26/2023     Sepsis without acute organ dysfunction, due to unspecified organism (H)  05/26/2023     Sepsis (H) 05/26/2023     UTI (urinary tract infection) - possible 05/26/2023     GUSTAVO (acute kidney injury) (H24) 05/26/2023     Dehydration 05/26/2023     Cellulitis - bilateral lower extremities 05/27/2023     Decubitus ulcers - 5 present on buttocks/perineal region, numerous scabbed over and unstagable on shin and bilateral feet with some bloody blisters noted on feet 05/27/2023     Warfarin-induced coagulopathy (H24) 05/27/2023     Hypoglycemia 05/27/2023     Supratherapeutic INR 05/27/2023     Non-healing wound of left heel 09/22/2023     Diabetic foot ulcer (H) 09/29/2023     Osteomyelitis (H) 10/24/2023     Pressure injury of right buttock, unstageable (H) 01/03/2024     Skin ulcer of right lower leg with fat layer exposed (H) 02/26/2024     Adverse effect of anticoagulants, initial encounter 04/16/2024     Muscle weakness (generalized) 11/15/2023     Nausea 11/15/2023     Other specified disorders of bone density and structure, unspecified site 05/20/2015     Other injury of unspecified body region, subsequent encounter 11/15/2023     Unspecified open wound, left foot, subsequent encounter 09/22/2023     Past Medical History:   Diagnosis Date     Herpes zoster without mention of complication      Hypercalcemia 02/24/2007     Mild intermittent asthma      Other urinary incontinence      Type II or unspecified type diabetes mellitus with renal manifestations, uncontrolled(250.42) (H)      Type II or unspecified type diabetes mellitus without mention of complication, not stated as uncontrolled      Unspecified essential hypertension      Allergies   Allergen Reactions     Prednisone Nausea and Vomiting     Morphine Nausea and Vomiting     Morphine [Fumaric Acid] Nausea and Vomiting     Nitrofurantoin Unknown     Per nursing home       All Meds and Allergies reviewed in the record at the facility and is the most up-to-date.    Post Discharge Medication Reconciliation Status: discharge medications  reconciled and changed, per note/orders  Current Outpatient Medications   Medication Sig Dispense Refill     acetaminophen (TYLENOL) 500 MG tablet Take 2 tablets (1,000 mg) by mouth every 8 hours as needed for mild pain Pain 1-5/10       amoxicillin-clavulanate (AUGMENTIN) 875-125 MG tablet Take 1 tablet by mouth 2 times daily for 10 doses       atorvastatin (LIPITOR) 40 MG tablet Take 1 tablet (40 mg) by mouth every evening       insulin degludec (TRESIBA) 200 UNIT/ML pen Inject 40 Units subcutaneously every morning Hold for blood glucose less than 100       melatonin 1 MG TABS tablet Take 1 tablet (1 mg) by mouth nightly as needed for sleep       methadone (DOLOPHINE) 5 MG tablet Take 1 tablet (5 mg) by mouth 4 times daily 0700, 1100, 1500, 2000 20 tablet 0     miconazole (MICATIN) 2 % external powder Apply topically 2 times daily       mineral oil-hydrophilic petrolatum (AQUAPHOR) external ointment Apply to left thigh wound BID       Multiple Vitamin (TAB-A-YUN) TABS Take 1 tablet by mouth daily at 2 pm       ondansetron (ZOFRAN ODT) 4 MG ODT tab Take 1 tablet (4 mg) by mouth every 6 hours as needed for nausea or vomiting       polyethylene glycol (MIRALAX) 17 GM/Dose powder Take 17 g by mouth 2 times daily as needed for constipation       potassium chloride ER (K-TAB) 20 MEQ CR tablet Take 20 mEq by mouth daily       senna-docusate (SENOKOT-S/PERICOLACE) 8.6-50 MG tablet Take 1 tablet by mouth 2 times daily as needed for constipation       WARFARIN SODIUM PO Take by mouth daily Dosing in coordination with INR results       Zinc oxide 10 % CREA Externally apply topically 3 times daily Apply to coccyx area once per shift after brief changes       Menthol, Topical Analgesic, (BIOFREEZE EX) Apply to neck and shoulders (Patient not taking: Reported on 7/17/2024)       No current facility-administered medications for this visit.       REVIEW OF SYSTEMS:   10 point review of systems reviewed and pertinent positives in  "the HPI.     PHYSICAL EXAMINATION:  Physical Exam     Vital signs: /72   Pulse 117   Temp 97.5  F (36.4  C)   Resp 20   Ht 1.676 m (5' 6\")   Wt 99.4 kg (219 lb 3.2 oz)   SpO2 98%   BMI 35.38 kg/m    General: Awake, Alert, oriented x3, sitting up in wheelchair, appropriately, follows simple commands, conversant  HEENT:Pink conjunctiva, moist oral mucosa  NECK: Supple  CVS:  S1  S2, without murmur or gallop.   LUNG: Clear to auscultation, No wheezes, rales or rhonci.  BACK: No kyphosis of the thoracic spine  ABDOMEN: Soft, obese, nontender to palpation, with positive bowel sounds  EXTREMITIES: Moves both upper and lower extremities. Left BKA, chronic lymphedema to RLE. 2+ edema to RLE.   SKIN: Coccyx ulcer not observed. RLE with dressing dry and intact.   NEUROLOGIC: Intact, pulses palpable  PSYCHIATRIC: Cognition intact      Labs:  All labs reviewed in the nursing home record and [facility]   @  Lab Results   Component Value Date    WBC 7.3 07/17/2024    WBC 9.4 02/22/2008     Lab Results   Component Value Date    RBC 3.14 07/17/2024    RBC 4.19 02/22/2008     Lab Results   Component Value Date    HGB 9.4 07/17/2024    HGB 12.6 02/22/2008     Lab Results   Component Value Date    HCT 29.5 07/17/2024    HCT 35.6 02/22/2008     Lab Results   Component Value Date    MCV 94 07/17/2024    MCV 85 02/22/2008     Lab Results   Component Value Date    MCH 29.9 07/17/2024    MCH 30.1 02/22/2008     Lab Results   Component Value Date    MCHC 31.9 07/17/2024    MCHC 35.4 02/22/2008     Lab Results   Component Value Date    RDW 16.3 07/17/2024    RDW Test not performed 02/22/2008     Lab Results   Component Value Date     07/17/2024     02/22/2008        @Last Comprehensive Metabolic Panel:  Sodium   Date Value Ref Range Status   07/16/2024 138 135 - 145 mmol/L Final   02/22/2008 140 133 - 144 mmol/L Final     Potassium   Date Value Ref Range Status   07/16/2024 3.5 3.4 - 5.3 mmol/L Final   06/28/2022 4.3 3.5 " - 5.0 mmol/L Final   02/22/2008 4.5 3.4 - 5.3 mmol/L Final     Chloride   Date Value Ref Range Status   07/16/2024 102 98 - 107 mmol/L Final   06/28/2022 97 (L) 98 - 107 mmol/L Final   02/22/2008 100 94 - 109 mmol/L Final     Carbon Dioxide   Date Value Ref Range Status   02/22/2008 28 20 - 32 mmol/L Final     Carbon Dioxide (CO2)   Date Value Ref Range Status   07/16/2024 27 22 - 29 mmol/L Final   06/28/2022 29 22 - 31 mmol/L Final     Anion Gap   Date Value Ref Range Status   07/16/2024 9 7 - 15 mmol/L Final   06/28/2022 13 5 - 18 mmol/L Final   02/22/2008 11 6 - 17 mmol/L Final     Glucose   Date Value Ref Range Status   06/28/2022 73 70 - 125 mg/dL Final   02/22/2008 298 (H) 60 - 99 mg/dL Final     GLUCOSE BY METER POCT   Date Value Ref Range Status   07/16/2024 301 (H) 70 - 99 mg/dL Final     Comment:     Dr/RN Notified     Urea Nitrogen   Date Value Ref Range Status   07/16/2024 19.4 8.0 - 23.0 mg/dL Final   06/28/2022 42 (H) 8 - 28 mg/dL Final   02/22/2008 15 7 - 30 mg/dL Final     Creatinine   Date Value Ref Range Status   07/16/2024 1.03 (H) 0.51 - 0.95 mg/dL Final   02/22/2008 0.82 0.60 - 1.30 mg/dL Final     GFR Estimate   Date Value Ref Range Status   07/16/2024 55 (L) >60 mL/min/1.73m2 Final     Comment:     eGFR calculated using 2021 CKD-EPI equation.   02/22/2008 75 >60 mL/min/1.7m2 Final     Calcium   Date Value Ref Range Status   07/16/2024 8.6 (L) 8.8 - 10.2 mg/dL Final   02/22/2008 10.4 8.5 - 10.4 mg/dL Final     Greater than 45 minutes spent preparing for this visit, reviewing hospitalization records reviewing multiple hospitalizations, medications, labs, imaging as well as face-to-face time spent with patient and collaborating with the nursing staff and wound doctor.    This note has been dictated using voice recognition software. Any grammatical or context distortions are unintentional and inherent to the software    Electronically signed by: Abby Jacinto CNP       Sincerely,        Abby  Ophelia, NP

## 2024-07-18 ENCOUNTER — TRANSITIONAL CARE UNIT VISIT (OUTPATIENT)
Dept: GERIATRICS | Facility: CLINIC | Age: 79
End: 2024-07-18
Payer: COMMERCIAL

## 2024-07-18 ENCOUNTER — LAB REQUISITION (OUTPATIENT)
Dept: LAB | Facility: CLINIC | Age: 79
End: 2024-07-18
Payer: COMMERCIAL

## 2024-07-18 VITALS
SYSTOLIC BLOOD PRESSURE: 123 MMHG | DIASTOLIC BLOOD PRESSURE: 72 MMHG | RESPIRATION RATE: 20 BRPM | OXYGEN SATURATION: 98 % | BODY MASS INDEX: 35.23 KG/M2 | WEIGHT: 219.2 LBS | HEIGHT: 66 IN | HEART RATE: 117 BPM | TEMPERATURE: 97.5 F

## 2024-07-18 DIAGNOSIS — I50.812 CHRONIC RIGHT-SIDED HEART FAILURE (H): ICD-10-CM

## 2024-07-18 DIAGNOSIS — L97.909 VENOUS ULCER (H): ICD-10-CM

## 2024-07-18 DIAGNOSIS — E11.42 TYPE 2 DIABETES MELLITUS WITH DIABETIC POLYNEUROPATHY, WITH LONG-TERM CURRENT USE OF INSULIN (H): ICD-10-CM

## 2024-07-18 DIAGNOSIS — L03.115 CELLULITIS OF RIGHT LOWER EXTREMITY: ICD-10-CM

## 2024-07-18 DIAGNOSIS — G89.4 CHRONIC PAIN SYNDROME: ICD-10-CM

## 2024-07-18 DIAGNOSIS — I50.812 CHRONIC RIGHT HEART FAILURE (H): ICD-10-CM

## 2024-07-18 DIAGNOSIS — I48.20 CHRONIC ATRIAL FIBRILLATION (H): ICD-10-CM

## 2024-07-18 DIAGNOSIS — G47.33 OSA (OBSTRUCTIVE SLEEP APNEA): ICD-10-CM

## 2024-07-18 DIAGNOSIS — I83.009 VENOUS ULCER (H): ICD-10-CM

## 2024-07-18 DIAGNOSIS — F11.20 OPIOID DEPENDENCE, UNCOMPLICATED (H): ICD-10-CM

## 2024-07-18 DIAGNOSIS — Z89.512 STATUS POST BELOW-KNEE AMPUTATION OF LEFT LOWER EXTREMITY (H): ICD-10-CM

## 2024-07-18 DIAGNOSIS — I89.0 LYMPHEDEMA: Primary | ICD-10-CM

## 2024-07-18 DIAGNOSIS — Z79.4 TYPE 2 DIABETES MELLITUS WITH DIABETIC POLYNEUROPATHY, WITH LONG-TERM CURRENT USE OF INSULIN (H): ICD-10-CM

## 2024-07-18 PROCEDURE — 99309 SBSQ NF CARE MODERATE MDM 30: CPT | Performed by: NURSE PRACTITIONER

## 2024-07-18 RX ORDER — TORSEMIDE 20 MG/1
60 TABLET ORAL DAILY
COMMUNITY

## 2024-07-18 NOTE — LETTER
7/18/2024      Linda Loredo  [phone] [address]        [facility]    Code Status:  FULL CODE   Visit Type:   Chief Complaint   Patient presents with     TCU Follow Up     Facility:  [facility] ([facility]) [[id number]]         HPI: Linda Loredo is a 79 year old female who I am seeing today for follow up on the TCU. 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  Patient recently hospitalized with altered mental status.  Patient previously living at [facility].  Patient found to have right lower extremity cellulitis secondary to a large venous stasis ulcer from chronic lymphedema.  Patient treated with IV Zosyn.  Blood cultures x 2 shows no growth.  She did undergo debridement in the OR on 7/14/2024.  CT angiogram of the right lower extremity without evidence for arterial stenosis.  UTI.  Antibiotics complete.    Transitional Care Course: Today patient sitting up in wheelchair. Pt with large wound to RLE. Area observed today with 75% beefy red granulation and 25% slough. Min serosanguinous drainage. Slight red. She has completed her antibiotics. She has hx of chronic lymphedema. Today she has a new fluid filled blister just below the knee on the RLE. Her diuretics were held during hospitalization due to worsening kidney function. She was receiving 60 mg of torsemide daily and once a week metolazone. Follow up BMP WNL. She denies any SOB or CP. She does have underlying congestive heart failure. DM type 2, pt noncompliant with diabetic diet. Her blood sugars are in the 200s. Today ask many questions  about her insulin. At home she took 34 units. She admitted on 40 units. Recent A1 C 9.9. Will need close monitoring.       Assessment/Plan:      -Right lower extremity cellulitis  -Right lower extremity wounds of mixed etiology including venous stasis impressions.  -Coccyx Ulcer  -MRI of the right foot negative for osteomyelitis.  -Patient had been treated outpatient on Keflex 7/3 with no improvement.    -During hospitalization patient treated with IV Zosyn for elevated white count and CRP.   -Continues Augmentin twice daily x 10 days.  Stop date 7/21.  -ABIs attempted on 7/12/2024 -  ALMA cannot be obtained due to noncompressible vessels. Monophasic waveforms on the right suggests arterial disease which could be further evaluated with duplex ultrasound     Right lower extremity arterial duplex with sluggish arterial flow with no focal stenosis.    MRI right foot shows no evidence of osteomyelitis, septic arthropathy, or abscess.  -Blood cultures x 2 from 7/11 shows no growth x 3 days  -Patient status post I&D on 7/14/2024  -CT angiogram right lower extremity without evidence for arterial stenoses  -Continues with topical treatment to both right lower extremity and coccyx.  -Ok for House Wound MD to consult.   -Add calcium alginate with sliver over large fluid filled blister to RLE with dressing changes daily.     Chronic Lymphedema   -Ace wrap to RLE over dressings, change daily.   -Pt has custom compression wraps to be resumed once wounds are healed.  -Resume torsemide 60 mg daily.     GUSTAVO on CKD stage 3  -Baseline Cr 1.0- 1.1. Hospital 2.0< 1.03.   -Diuretics held during hospitalization continue to be held.  -Creatinine 0.89.  -Okay to resume torsemide.     Chronic Right Sided Heart failure'   Pulmonary hypertension by echo and mild to moderate pulmonary hypertension by angiogram 2006  Chronic lower extremities edema  Venous stasis   -Echo 5/2022 [facility] - Left Ventricle Normal left ventricular chamber size. Normal  left ventricular systolic function. Calculated left ventricular ejection  fraction (modified Pan technique) is 69 %. No regional wall motion abnormalities. Mildly increased left ventricular wall thickness. Indeterminate left ventricular diastolic function. Right ventricle was not well visualized. Estimated right ventricular systolic pressure is 34.3 mmHg plus right atrial pressure. Estimated right ventricular systolic pressure is 47.6 mmHg. Normal right ventricular systolic function. Mild mitral valve regurgitation. Mild tricuspid valve regurgitation.   -Increase torsemide 60 mg daily. (Pt was on 60 mg daily at home.)   -Increase potassium to 20 meq BID.   -Previously on metolazone 2.5 mg 3 times weekly.  (Monitor weights, edema and shortness of breath for resuming.)  -Daily weights.  -2 g sodium diet.  -Follow up BMP on Monday.      Urinary tract infection  Recently hospitalized 6/6-10/24 for sepsis secondary to cellulitis of right lower extremity with 1/1 blood cultures positive for pan-sensitive S. dysgalactiae. Repeat BCX 6/7 were negative. She also had a UTI and urine culture grew providencia rettgeri - resistant to zosyn/unasyn,  susceptible to rocephin. She was treated with zosyn and vanco, changed to ceftriaxone and discharged to a TCU with ceftriaxone for 2 weeks.  -UC from 7/11 <10,000 urogenital danny.   -Completed course of Zosyn during hospitalization  -Denies dysuria.      Atrial fibrillation, likely permanent   -New onset during 5/2022 admission   -EKG 7/10/2024 - fib - flutter, rate controlled  -Chronically on warfarin.  -Coumadin clinic to manage INRs.  -INR 1.6.     Anemia, normocytic/chronic   -Admission HGB 10.3. Baseline mostly 10-10.5.  -B12 1349. Ferritin 189. %Fe sat 30   -Repeat CBC  hgb 9.4.      Type 2 Diabetes:   Last A1c 9.9 3/2024  -continueTresiba 40 units Q AM.   -consistent carb diet, 2 gm sodium   -Blood sugar checks QID.        Obstructive sleep apnea  -Sleep study 2004  reportedly showed severe obstructive sleep apnea and recommend CPAP  -Currently not on CPAP.     Hypercalcemia   Primary hyperparathyroidism per problem list  -Work up January 2013 Dr. Villareal, juan. Hypercalcemia with elevated PTH.   -follow up out pt.      S/p left below-knee amputation 10/2023  -has prosthetic but does not wear it. States it is too big and has caused sores.   -consult prosthetic company for adjustments.      Fibromyalgia  Opioid dependence  -Continue with methadone 5 mg QID.   -Continue Biofreeze.      History of hypertension  -resume torsemide.      Hyperlipidemia  -Continue atorvastatin 40 mg daily.    -ok to PT, OT to eval and treat.     Active Ambulatory Problems     Diagnosis Date Noted     Type 2 diabetes mellitus with complication, with long-term current use of insulin (H) 04/18/2005     Essential hypertension with goal blood pressure less than 140/90 05/09/2005     Mixed stress and urge urinary incontinence 05/09/2005     Insomnia 07/15/2005     Mixed hyperlipidemia 12/05/2005     Obesity with BMI >35 and comorbidity 03/13/2006     Diabetic polyneuropathy associated with type 2 diabetes mellitus (H) 04/07/2006     Osteoarthritis 09/18/2006     BERLIN (obstructive sleep apnea) 10/23/2007     Fibromyalgia 10/20/2011     Allergic rhinitis 04/08/2008     Anticoagulation monitoring, INR range 2-3 07/06/2022     Microalbuminuria due to type 2 diabetes mellitus (H) 10/25/2016     Chronic atrial fibrillation (H) 05/30/2022     Osteopenia 05/20/2015     Vitamin D deficiency 09/08/2022     Umbilical hernia without obstruction and without gangrene 12/13/2018     Primary hyperparathyroidism (H24) 01/23/2013     Chronic pain 04/26/2010     Opioid dependence, uncomplicated (H) 04/26/2023     Chronic right-sided heart failure (H) 11/16/2023     Cellulitis of right lower extremity 06/06/2024     Chronic kidney disease, stage 3b (H) 06/12/2024     Cardiac murmur, unspecified 05/29/2019     Hx of  osteomyelitis 04/16/2024     Lymphedema, not elsewhere classified 02/09/2024     Constipation 04/16/2024     Other abnormalities of gait and mobility 11/15/2023     Venous stasis 04/16/2024     Acute kidney injury (H24) 07/11/2024     Sepsis, due to unspecified organism, unspecified whether acute organ dysfunction present (H) 07/11/2024     Metabolic encephalopathy 07/11/2024     Pressure injury of deep tissue of sacral region 07/11/2024     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) 07/11/2024     Anemia 07/11/2024     Pulmonary hypertension (H) 07/11/2024     Acute cystitis without hematuria 07/11/2024     Ulcer of right lower extremity (H) 07/12/2024     Resolved Ambulatory Problems     Diagnosis Date Noted     DEPRESSION 05/09/2005     Herpes zoster 06/20/2005     Anxiety 10/20/2011     Chronic kidney disease 10/20/2011     Elevated liver enzymes 10/20/2011     Fracture of fibula, distal, left, closed 10/20/2011     Hypotension 10/27/2011     Abscess of left foot 10/31/2022     Diabetic ulcer of left heel associated with type 2 diabetes mellitus, with muscle involvement without evidence of necrosis (H) 05/17/2022     Major depressive disorder, recurrent episode, moderate (H) 04/08/2008     Mixed hyperlipidemia due to type 2 diabetes mellitus (H) 04/08/2008     Venous stasis ulcers of both lower extremities (H) 01/13/2022     Cellulitis of buttock 05/26/2023     Sepsis without acute organ dysfunction, due to unspecified organism (H) 05/26/2023     Sepsis (H) 05/26/2023     UTI (urinary tract infection) - possible 05/26/2023     GUSTAVO (acute kidney injury) (H24) 05/26/2023     Dehydration 05/26/2023     Cellulitis - bilateral lower extremities 05/27/2023     Decubitus ulcers - 5 present on buttocks/perineal region, numerous scabbed over and unstagable on shin and bilateral feet with some bloody blisters noted on feet 05/27/2023     Warfarin-induced coagulopathy (H24) 05/27/2023     Hypoglycemia  05/27/2023     Supratherapeutic INR 05/27/2023     Non-healing wound of left heel 09/22/2023     Diabetic foot ulcer (H) 09/29/2023     Osteomyelitis (H) 10/24/2023     Pressure injury of right buttock, unstageable (H) 01/03/2024     Skin ulcer of right lower leg with fat layer exposed (H) 02/26/2024     Adverse effect of anticoagulants, initial encounter 04/16/2024     Muscle weakness (generalized) 11/15/2023     Nausea 11/15/2023     Other specified disorders of bone density and structure, unspecified site 05/20/2015     Other injury of unspecified body region, subsequent encounter 11/15/2023     Unspecified open wound, left foot, subsequent encounter 09/22/2023     Past Medical History:   Diagnosis Date     Herpes zoster without mention of complication      Hypercalcemia 02/24/2007     Mild intermittent asthma      Other urinary incontinence      Type II or unspecified type diabetes mellitus with renal manifestations, uncontrolled(250.42) (H)      Type II or unspecified type diabetes mellitus without mention of complication, not stated as uncontrolled      Unspecified essential hypertension      Allergies   Allergen Reactions     Prednisone Nausea and Vomiting     Morphine Nausea and Vomiting     Morphine [Fumaric Acid] Nausea and Vomiting     Nitrofurantoin Unknown     Per nursing home       All Meds and Allergies reviewed in the record at the facility and is the most up-to-date.    Post Discharge Medication Reconciliation Status: discharge medications reconciled and changed, per note/orders  Current Outpatient Medications   Medication Sig Dispense Refill     acetaminophen (TYLENOL) 500 MG tablet Take 2 tablets (1,000 mg) by mouth every 8 hours as needed for mild pain Pain 1-5/10       amoxicillin-clavulanate (AUGMENTIN) 875-125 MG tablet Take 1 tablet by mouth 2 times daily for 10 doses       atorvastatin (LIPITOR) 40 MG tablet Take 1 tablet (40 mg) by mouth every evening       insulin degludec (TRESIBA) 200  "UNIT/ML pen Inject 40 Units subcutaneously every morning Hold for blood glucose less than 100       melatonin 1 MG TABS tablet Take 1 tablet (1 mg) by mouth nightly as needed for sleep       Menthol, Topical Analgesic, (BIOFREEZE EX) Apply 1 Application topically 3 times daily as needed Apply to neck and shoulders       methadone (DOLOPHINE) 5 MG tablet Take 1 tablet (5 mg) by mouth 4 times daily 0700, 1100, 1500, 2000 20 tablet 0     miconazole (MICATIN) 2 % external powder Apply topically 2 times daily       mineral oil-hydrophilic petrolatum (AQUAPHOR) external ointment Apply to left thigh wound BID       Multiple Vitamin (TAB-A-YUN) TABS Take 1 tablet by mouth daily at 2 pm       ondansetron (ZOFRAN ODT) 4 MG ODT tab Take 1 tablet (4 mg) by mouth every 6 hours as needed for nausea or vomiting       polyethylene glycol (MIRALAX) 17 GM/Dose powder Take 17 g by mouth 2 times daily as needed for constipation       potassium chloride ER (K-TAB) 20 MEQ CR tablet Take 20 mEq by mouth daily       senna-docusate (SENOKOT-S/PERICOLACE) 8.6-50 MG tablet Take 1 tablet by mouth 2 times daily as needed for constipation       WARFARIN SODIUM PO Take by mouth daily Dosing in coordination with INR results       Zinc oxide 10 % CREA Externally apply topically 3 times daily Apply to coccyx area once per shift after brief changes       No current facility-administered medications for this visit.       REVIEW OF SYSTEMS:   10 point review of systems reviewed and pertinent positives in the HPI.     PHYSICAL EXAMINATION:  Physical Exam     Vital signs: /72   Pulse 117   Temp 97.5  F (36.4  C)   Resp 20   Ht 1.676 m (5' 6\")   Wt 99.4 kg (219 lb 3.2 oz)   SpO2 98%   BMI 35.38 kg/m    General: Awake, Alert, oriented x3, sitting up in wheelchair,  conversant  HEENT:Pink conjunctiva, moist oral mucosa  NECK: Supple  BACK: No kyphosis of the thoracic spine  ABDOMEN: Soft, obese.  EXTREMITIES: Moves both upper and lower " extremities. Left BKA, chronic lymphedema to RLE. 2-3+ edema to RLE.   SKIN: RLE with 75% beefy red granulation, 25% slough. Slight redness. Small amount of serosanguineous drainage. Large fluid filled blister just below the right knee.   NEUROLOGIC: Intact, pulses palpable  PSYCHIATRIC: Cognition intact      Labs:  All labs reviewed in the nursing home record and Epic   @  Lab Results   Component Value Date    WBC 7.3 07/17/2024    WBC 9.4 02/22/2008     Lab Results   Component Value Date    RBC 3.14 07/17/2024    RBC 4.19 02/22/2008     Lab Results   Component Value Date    HGB 9.4 07/17/2024    HGB 12.6 02/22/2008     Lab Results   Component Value Date    HCT 29.5 07/17/2024    HCT 35.6 02/22/2008     Lab Results   Component Value Date    MCV 94 07/17/2024    MCV 85 02/22/2008     Lab Results   Component Value Date    MCH 29.9 07/17/2024    MCH 30.1 02/22/2008     Lab Results   Component Value Date    MCHC 31.9 07/17/2024    MCHC 35.4 02/22/2008     Lab Results   Component Value Date    RDW 16.3 07/17/2024    RDW Test not performed 02/22/2008     Lab Results   Component Value Date     07/17/2024     02/22/2008        @Last Comprehensive Metabolic Panel:  Sodium   Date Value Ref Range Status   07/17/2024 138 135 - 145 mmol/L Final   02/22/2008 140 133 - 144 mmol/L Final     Potassium   Date Value Ref Range Status   07/17/2024 4.0 3.4 - 5.3 mmol/L Final   06/28/2022 4.3 3.5 - 5.0 mmol/L Final   02/22/2008 4.5 3.4 - 5.3 mmol/L Final     Chloride   Date Value Ref Range Status   07/17/2024 103 98 - 107 mmol/L Final   06/28/2022 97 (L) 98 - 107 mmol/L Final   02/22/2008 100 94 - 109 mmol/L Final     Carbon Dioxide   Date Value Ref Range Status   02/22/2008 28 20 - 32 mmol/L Final     Carbon Dioxide (CO2)   Date Value Ref Range Status   07/17/2024 29 22 - 29 mmol/L Final   06/28/2022 29 22 - 31 mmol/L Final     Anion Gap   Date Value Ref Range Status   07/17/2024 6 (L) 7 - 15 mmol/L Final   06/28/2022 13 5  - 18 mmol/L Final   02/22/2008 11 6 - 17 mmol/L Final     Glucose   Date Value Ref Range Status   07/17/2024 149 (H) 70 - 99 mg/dL Final   06/28/2022 73 70 - 125 mg/dL Final   02/22/2008 298 (H) 60 - 99 mg/dL Final     GLUCOSE BY METER POCT   Date Value Ref Range Status   07/16/2024 301 (H) 70 - 99 mg/dL Final     Comment:     Dr/RN Notified     Urea Nitrogen   Date Value Ref Range Status   07/17/2024 14.3 8.0 - 23.0 mg/dL Final   06/28/2022 42 (H) 8 - 28 mg/dL Final   02/22/2008 15 7 - 30 mg/dL Final     Creatinine   Date Value Ref Range Status   07/17/2024 0.89 0.51 - 0.95 mg/dL Final   02/22/2008 0.82 0.60 - 1.30 mg/dL Final     GFR Estimate   Date Value Ref Range Status   07/17/2024 66 >60 mL/min/1.73m2 Final   02/22/2008 75 >60 mL/min/1.7m2 Final     Calcium   Date Value Ref Range Status   07/17/2024 9.1 8.8 - 10.4 mg/dL Final     Comment:     Reference intervals for this test were updated on 7/16/2024 to reflect our healthy population more accurately. There may be differences in the flagging of prior results with similar values performed with this method. Those prior results can be interpreted in the context of the updated reference intervals.   02/22/2008 10.4 8.5 - 10.4 mg/dL Final     This note has been dictated using voice recognition software. Any grammatical or context distortions are unintentional and inherent to the software    Electronically signed by: Abby Jacinto, ARNAV       Sincerely,        Abby Jacinto, NP

## 2024-07-19 DIAGNOSIS — F11.20 OPIOID DEPENDENCE, UNCOMPLICATED (H): ICD-10-CM

## 2024-07-19 RX ORDER — METHADONE HYDROCHLORIDE 5 MG/1
5 TABLET ORAL 4 TIMES DAILY
Qty: 120 TABLET | Refills: 0 | Status: ON HOLD | OUTPATIENT
Start: 2024-07-19 | End: 2024-08-07

## 2024-07-19 NOTE — PROGRESS NOTES
[facility] GERIATRIC SERVICES    Code Status:  FULL CODE   Visit Type:   Chief Complaint   Patient presents with    TCU Follow Up     Facility:  [facility] ([facility]) [[id number]]         HPI: Linda Loredo is a 79 year old female who I am seeing today for follow up on the TCU. 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  Patient recently hospitalized with altered mental status.  Patient previously living at [facility].  Patient found to have right lower extremity cellulitis secondary to a large venous stasis ulcer from chronic lymphedema.  Patient treated with IV Zosyn.  Blood cultures x 2 shows no growth.  She did undergo debridement in the OR on 7/14/2024.  CT angiogram of the right lower extremity without evidence for arterial stenosis.  UTI.  Antibiotics complete.    Transitional Care Course: Today patient sitting up in wheelchair. Pt with large wound to RLE. Area observed today with 75% beefy red granulation and 25% slough. Min serosanguinous drainage. Slight red. She has completed her antibiotics. She has hx of chronic lymphedema. Today she has a new fluid filled blister just below the knee on the RLE. Her diuretics were held during hospitalization due to worsening kidney function. She was receiving 60 mg of torsemide daily and once a week metolazone. Follow up BMP WNL. She denies any SOB or CP. She does have underlying congestive heart failure. DM type 2, pt noncompliant with diabetic diet. Her blood sugars are in the 200s. Today ask many questions about her insulin. At home she took 34 units. She admitted on 40 units. Recent A1 C 9.9. Will need  close monitoring.       Assessment/Plan:      -Right lower extremity cellulitis  -Right lower extremity wounds of mixed etiology including venous stasis impressions.  -Coccyx Ulcer  -MRI of the right foot negative for osteomyelitis.  -Patient had been treated outpatient on Keflex 7/3 with no improvement.    -During hospitalization patient treated with IV Zosyn for elevated white count and CRP.   -Continues Augmentin twice daily x 10 days.  Stop date 7/21.  -ABIs attempted on 7/12/2024 -  ALMA cannot be obtained due to noncompressible vessels. Monophasic waveforms on the right suggests arterial disease which could be further evaluated with duplex ultrasound     Right lower extremity arterial duplex with sluggish arterial flow with no focal stenosis.    MRI right foot shows no evidence of osteomyelitis, septic arthropathy, or abscess.  -Blood cultures x 2 from 7/11 shows no growth x 3 days  -Patient status post I&D on 7/14/2024  -CT angiogram right lower extremity without evidence for arterial stenoses  -Continues with topical treatment to both right lower extremity and coccyx.  -Ok for House Wound MD to consult.   -Add calcium alginate with sliver over large fluid filled blister to RLE with dressing changes daily.     Chronic Lymphedema   -Ace wrap to RLE over dressings, change daily.   -Pt has custom compression wraps to be resumed once wounds are healed.  -Resume torsemide 60 mg daily.     GUSTAVO on CKD stage 3  -Baseline Cr 1.0- 1.1. Hospital 2.0< 1.03.   -Diuretics held during hospitalization continue to be held.  -Creatinine 0.89.  -Okay to resume torsemide.     Chronic Right Sided Heart failure'   Pulmonary hypertension by echo and mild to moderate pulmonary hypertension by angiogram 2006  Chronic lower extremities edema  Venous stasis   -Echo 5/2022 [facility] - Left Ventricle Normal left ventricular chamber size. Normal left ventricular systolic function. Calculated left ventricular ejection  fraction (modified  Pan technique) is 69 %. No regional wall motion abnormalities. Mildly increased left ventricular wall thickness. Indeterminate left ventricular diastolic function. Right ventricle was not well visualized. Estimated right ventricular systolic pressure is 34.3 mmHg plus right atrial pressure. Estimated right ventricular systolic pressure is 47.6 mmHg. Normal right ventricular systolic function. Mild mitral valve regurgitation. Mild tricuspid valve regurgitation.   -Increase torsemide 60 mg daily. (Pt was on 60 mg daily at home.)   -Increase potassium to 20 meq BID.   -Previously on metolazone 2.5 mg 3 times weekly.  (Monitor weights, edema and shortness of breath for resuming.)  -Daily weights.  -2 g sodium diet.  -Follow up BMP on Monday.      Urinary tract infection  Recently hospitalized 6/6-10/24 for sepsis secondary to cellulitis of right lower extremity with 1/1 blood cultures positive for pan-sensitive S. dysgalactiae. Repeat BCX 6/7 were negative. She also had a UTI and urine culture grew providencia rettgeri - resistant to zosyn/unasyn,  susceptible to rocephin. She was treated with zosyn and vanco, changed to ceftriaxone and discharged to a TCU with ceftriaxone for 2 weeks.  -UC from 7/11 <10,000 urogenital danny.   -Completed course of Zosyn during hospitalization  -Denies dysuria.      Atrial fibrillation, likely permanent   -New onset during 5/2022 admission   -EKG 7/10/2024 - fib - flutter, rate controlled  -Chronically on warfarin.  -Coumadin clinic to manage INRs.  -INR 1.6.     Anemia, normocytic/chronic   -Admission HGB 10.3. Baseline mostly 10-10.5.  -B12 1349. Ferritin 189. %Fe sat 30   -Repeat CBC  hgb 9.4.      Type 2 Diabetes:   Last A1c 9.9 3/2024  -continueTresiba 40 units Q AM.   -consistent carb diet, 2 gm sodium   -Blood sugar checks QID.        Obstructive sleep apnea  -Sleep study 2004 reportedly showed severe obstructive sleep apnea and recommend CPAP  -Currently not on CPAP.      Hypercalcemia   Primary hyperparathyroidism per problem list  -Work up January 2013 Dr. Villareal, endo. Hypercalcemia with elevated PTH.   -follow up out pt.      S/p left below-knee amputation 10/2023  -has prosthetic but does not wear it. States it is too big and has caused sores.   -consult prosthetic company for adjustments.      Fibromyalgia  Opioid dependence  -Continue with methadone 5 mg QID.   -Continue Biofreeze.      History of hypertension  -resume torsemide.      Hyperlipidemia  -Continue atorvastatin 40 mg daily.    -ok to PT, OT to eval and treat.     Active Ambulatory Problems     Diagnosis Date Noted    Type 2 diabetes mellitus with complication, with long-term current use of insulin (H) 04/18/2005    Essential hypertension with goal blood pressure less than 140/90 05/09/2005    Mixed stress and urge urinary incontinence 05/09/2005    Insomnia 07/15/2005    Mixed hyperlipidemia 12/05/2005    Obesity with BMI >35 and comorbidity 03/13/2006    Diabetic polyneuropathy associated with type 2 diabetes mellitus (H) 04/07/2006    Osteoarthritis 09/18/2006    BERLIN (obstructive sleep apnea) 10/23/2007    Fibromyalgia 10/20/2011    Allergic rhinitis 04/08/2008    Anticoagulation monitoring, INR range 2-3 07/06/2022    Microalbuminuria due to type 2 diabetes mellitus (H) 10/25/2016    Chronic atrial fibrillation (H) 05/30/2022    Osteopenia 05/20/2015    Vitamin D deficiency 09/08/2022    Umbilical hernia without obstruction and without gangrene 12/13/2018    Primary hyperparathyroidism (H24) 01/23/2013    Chronic pain 04/26/2010    Opioid dependence, uncomplicated (H) 04/26/2023    Chronic right-sided heart failure (H) 11/16/2023    Cellulitis of right lower extremity 06/06/2024    Chronic kidney disease, stage 3b (H) 06/12/2024    Cardiac murmur, unspecified 05/29/2019    Hx of osteomyelitis 04/16/2024    Lymphedema, not elsewhere classified 02/09/2024    Constipation 04/16/2024    Other abnormalities of  gait and mobility 11/15/2023    Venous stasis 04/16/2024    Acute kidney injury (H24) 07/11/2024    Sepsis, due to unspecified organism, unspecified whether acute organ dysfunction present (H) 07/11/2024    Metabolic encephalopathy 07/11/2024    Pressure injury of deep tissue of sacral region 07/11/2024    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) 07/11/2024    Anemia 07/11/2024    Pulmonary hypertension (H) 07/11/2024    Acute cystitis without hematuria 07/11/2024    Ulcer of right lower extremity (H) 07/12/2024     Resolved Ambulatory Problems     Diagnosis Date Noted    DEPRESSION 05/09/2005    Herpes zoster 06/20/2005    Anxiety 10/20/2011    Chronic kidney disease 10/20/2011    Elevated liver enzymes 10/20/2011    Fracture of fibula, distal, left, closed 10/20/2011    Hypotension 10/27/2011    Abscess of left foot 10/31/2022    Diabetic ulcer of left heel associated with type 2 diabetes mellitus, with muscle involvement without evidence of necrosis (H) 05/17/2022    Major depressive disorder, recurrent episode, moderate (H) 04/08/2008    Mixed hyperlipidemia due to type 2 diabetes mellitus (H) 04/08/2008    Venous stasis ulcers of both lower extremities (H) 01/13/2022    Cellulitis of buttock 05/26/2023    Sepsis without acute organ dysfunction, due to unspecified organism (H) 05/26/2023    Sepsis (H) 05/26/2023    UTI (urinary tract infection) - possible 05/26/2023    GUSTAVO (acute kidney injury) (H24) 05/26/2023    Dehydration 05/26/2023    Cellulitis - bilateral lower extremities 05/27/2023    Decubitus ulcers - 5 present on buttocks/perineal region, numerous scabbed over and unstagable on shin and bilateral feet with some bloody blisters noted on feet 05/27/2023    Warfarin-induced coagulopathy (H24) 05/27/2023    Hypoglycemia 05/27/2023    Supratherapeutic INR 05/27/2023    Non-healing wound of left heel 09/22/2023    Diabetic foot ulcer (H) 09/29/2023    Osteomyelitis (H) 10/24/2023     Pressure injury of right buttock, unstageable (H) 01/03/2024    Skin ulcer of right lower leg with fat layer exposed (H) 02/26/2024    Adverse effect of anticoagulants, initial encounter 04/16/2024    Muscle weakness (generalized) 11/15/2023    Nausea 11/15/2023    Other specified disorders of bone density and structure, unspecified site 05/20/2015    Other injury of unspecified body region, subsequent encounter 11/15/2023    Unspecified open wound, left foot, subsequent encounter 09/22/2023     Past Medical History:   Diagnosis Date    Herpes zoster without mention of complication     Hypercalcemia 02/24/2007    Mild intermittent asthma     Other urinary incontinence     Type II or unspecified type diabetes mellitus with renal manifestations, uncontrolled(250.42) (H)     Type II or unspecified type diabetes mellitus without mention of complication, not stated as uncontrolled     Unspecified essential hypertension      Allergies   Allergen Reactions    Prednisone Nausea and Vomiting    Morphine Nausea and Vomiting    Morphine [Fumaric Acid] Nausea and Vomiting    Nitrofurantoin Unknown     Per nursing home       All Meds and Allergies reviewed in the record at the facility and is the most up-to-date.    Post Discharge Medication Reconciliation Status: discharge medications reconciled and changed, per note/orders  Current Outpatient Medications   Medication Sig Dispense Refill    torsemide (DEMADEX) 20 MG tablet Take 60 mg by mouth daily      acetaminophen (TYLENOL) 500 MG tablet Take 2 tablets (1,000 mg) by mouth every 8 hours as needed for mild pain Pain 1-5/10      amoxicillin-clavulanate (AUGMENTIN) 875-125 MG tablet Take 1 tablet by mouth 2 times daily for 10 doses      atorvastatin (LIPITOR) 40 MG tablet Take 1 tablet (40 mg) by mouth every evening      insulin degludec (TRESIBA) 200 UNIT/ML pen Inject 40 Units subcutaneously every morning Hold for blood glucose less than 100      melatonin 1 MG TABS tablet  "Take 1 tablet (1 mg) by mouth nightly as needed for sleep      Menthol, Topical Analgesic, (BIOFREEZE EX) Apply 1 Application topically 3 times daily as needed Apply to neck and shoulders      methadone (DOLOPHINE) 5 MG tablet Take 1 tablet (5 mg) by mouth 4 times daily 0700, 1100, 1500, 2000 20 tablet 0    miconazole (MICATIN) 2 % external powder Apply topically 2 times daily      mineral oil-hydrophilic petrolatum (AQUAPHOR) external ointment Apply to left thigh wound BID      Multiple Vitamin (TAB-A-YUN) TABS Take 1 tablet by mouth daily at 2 pm      ondansetron (ZOFRAN ODT) 4 MG ODT tab Take 1 tablet (4 mg) by mouth every 6 hours as needed for nausea or vomiting      polyethylene glycol (MIRALAX) 17 GM/Dose powder Take 17 g by mouth 2 times daily as needed for constipation      potassium chloride ER (K-TAB) 20 MEQ CR tablet Take 20 mEq by mouth 2 times daily      senna-docusate (SENOKOT-S/PERICOLACE) 8.6-50 MG tablet Take 1 tablet by mouth 2 times daily as needed for constipation      WARFARIN SODIUM PO Take by mouth daily Dosing in coordination with INR results      Zinc oxide 10 % CREA Externally apply topically 3 times daily Apply to coccyx area once per shift after brief changes       No current facility-administered medications for this visit.       REVIEW OF SYSTEMS:   10 point review of systems reviewed and pertinent positives in the HPI.     PHYSICAL EXAMINATION:  Physical Exam     Vital signs: /72   Pulse 117   Temp 97.5  F (36.4  C)   Resp 20   Ht 1.676 m (5' 6\")   Wt 99.4 kg (219 lb 3.2 oz)   SpO2 98%   BMI 35.38 kg/m    General: Awake, Alert, oriented x3, sitting up in wheelchair,  conversant  HEENT:Pink conjunctiva, moist oral mucosa  NECK: Supple  BACK: No kyphosis of the thoracic spine  ABDOMEN: Soft, obese.  EXTREMITIES: Moves both upper and lower extremities. Left BKA, chronic lymphedema to RLE. 2-3+ edema to RLE.   SKIN: RLE with 75% beefy red granulation, 25% slough. Slight " redness. Small amount of serosanguineous drainage. Large fluid filled blister just below the right knee.   NEUROLOGIC: Intact, pulses palpable  PSYCHIATRIC: Cognition intact      Labs:  All labs reviewed in the nursing home record and Epic   @  Lab Results   Component Value Date    WBC 7.3 07/17/2024    WBC 9.4 02/22/2008     Lab Results   Component Value Date    RBC 3.14 07/17/2024    RBC 4.19 02/22/2008     Lab Results   Component Value Date    HGB 9.4 07/17/2024    HGB 12.6 02/22/2008     Lab Results   Component Value Date    HCT 29.5 07/17/2024    HCT 35.6 02/22/2008     Lab Results   Component Value Date    MCV 94 07/17/2024    MCV 85 02/22/2008     Lab Results   Component Value Date    MCH 29.9 07/17/2024    MCH 30.1 02/22/2008     Lab Results   Component Value Date    MCHC 31.9 07/17/2024    MCHC 35.4 02/22/2008     Lab Results   Component Value Date    RDW 16.3 07/17/2024    RDW Test not performed 02/22/2008     Lab Results   Component Value Date     07/17/2024     02/22/2008        @Last Comprehensive Metabolic Panel:  Sodium   Date Value Ref Range Status   07/17/2024 138 135 - 145 mmol/L Final   02/22/2008 140 133 - 144 mmol/L Final     Potassium   Date Value Ref Range Status   07/17/2024 4.0 3.4 - 5.3 mmol/L Final   06/28/2022 4.3 3.5 - 5.0 mmol/L Final   02/22/2008 4.5 3.4 - 5.3 mmol/L Final     Chloride   Date Value Ref Range Status   07/17/2024 103 98 - 107 mmol/L Final   06/28/2022 97 (L) 98 - 107 mmol/L Final   02/22/2008 100 94 - 109 mmol/L Final     Carbon Dioxide   Date Value Ref Range Status   02/22/2008 28 20 - 32 mmol/L Final     Carbon Dioxide (CO2)   Date Value Ref Range Status   07/17/2024 29 22 - 29 mmol/L Final   06/28/2022 29 22 - 31 mmol/L Final     Anion Gap   Date Value Ref Range Status   07/17/2024 6 (L) 7 - 15 mmol/L Final   06/28/2022 13 5 - 18 mmol/L Final   02/22/2008 11 6 - 17 mmol/L Final     Glucose   Date Value Ref Range Status   07/17/2024 149 (H) 70 - 99 mg/dL  Final   06/28/2022 73 70 - 125 mg/dL Final   02/22/2008 298 (H) 60 - 99 mg/dL Final     GLUCOSE BY METER POCT   Date Value Ref Range Status   07/16/2024 301 (H) 70 - 99 mg/dL Final     Comment:     /RN Notified     Urea Nitrogen   Date Value Ref Range Status   07/17/2024 14.3 8.0 - 23.0 mg/dL Final   06/28/2022 42 (H) 8 - 28 mg/dL Final   02/22/2008 15 7 - 30 mg/dL Final     Creatinine   Date Value Ref Range Status   07/17/2024 0.89 0.51 - 0.95 mg/dL Final   02/22/2008 0.82 0.60 - 1.30 mg/dL Final     GFR Estimate   Date Value Ref Range Status   07/17/2024 66 >60 mL/min/1.73m2 Final   02/22/2008 75 >60 mL/min/1.7m2 Final     Calcium   Date Value Ref Range Status   07/17/2024 9.1 8.8 - 10.4 mg/dL Final     Comment:     Reference intervals for this test were updated on 7/16/2024 to reflect our healthy population more accurately. There may be differences in the flagging of prior results with similar values performed with this method. Those prior results can be interpreted in the context of the updated reference intervals.   02/22/2008 10.4 8.5 - 10.4 mg/dL Final     This note has been dictated using voice recognition software. Any grammatical or context distortions are unintentional and inherent to the software    Electronically signed by: Abby Jacinto, CNP

## 2024-07-21 ENCOUNTER — TELEPHONE (OUTPATIENT)
Dept: GERIATRICS | Facility: CLINIC | Age: 79
End: 2024-07-21
Payer: COMMERCIAL

## 2024-07-22 ENCOUNTER — TRANSITIONAL CARE UNIT VISIT (OUTPATIENT)
Dept: GERIATRICS | Facility: CLINIC | Age: 79
End: 2024-07-22
Payer: COMMERCIAL

## 2024-07-22 ENCOUNTER — LAB REQUISITION (OUTPATIENT)
Dept: LAB | Facility: CLINIC | Age: 79
End: 2024-07-22
Payer: COMMERCIAL

## 2024-07-22 ENCOUNTER — ANTICOAGULATION THERAPY VISIT (OUTPATIENT)
Dept: ANTICOAGULATION | Facility: CLINIC | Age: 79
End: 2024-07-22

## 2024-07-22 VITALS
WEIGHT: 230.4 LBS | SYSTOLIC BLOOD PRESSURE: 98 MMHG | DIASTOLIC BLOOD PRESSURE: 60 MMHG | BODY MASS INDEX: 37.03 KG/M2 | OXYGEN SATURATION: 100 % | HEART RATE: 114 BPM | RESPIRATION RATE: 16 BRPM | TEMPERATURE: 97.7 F | HEIGHT: 66 IN

## 2024-07-22 DIAGNOSIS — I50.812 CHRONIC RIGHT-SIDED HEART FAILURE (H): ICD-10-CM

## 2024-07-22 DIAGNOSIS — I48.20 CHRONIC ATRIAL FIBRILLATION (H): Primary | Chronic | ICD-10-CM

## 2024-07-22 DIAGNOSIS — G89.29 OTHER CHRONIC PAIN: Chronic | ICD-10-CM

## 2024-07-22 DIAGNOSIS — Z79.4 TYPE 2 DIABETES MELLITUS WITH COMPLICATION, WITH LONG-TERM CURRENT USE OF INSULIN (H): Primary | Chronic | ICD-10-CM

## 2024-07-22 DIAGNOSIS — N18.32 CHRONIC KIDNEY DISEASE, STAGE 3B (H): ICD-10-CM

## 2024-07-22 DIAGNOSIS — E11.8 TYPE 2 DIABETES MELLITUS WITH COMPLICATION, WITH LONG-TERM CURRENT USE OF INSULIN (H): Primary | Chronic | ICD-10-CM

## 2024-07-22 DIAGNOSIS — I87.8 VENOUS STASIS: Chronic | ICD-10-CM

## 2024-07-22 DIAGNOSIS — L97.912 ULCER OF RIGHT LOWER EXTREMITY WITH FAT LAYER EXPOSED (H): ICD-10-CM

## 2024-07-22 LAB
ANION GAP SERPL CALCULATED.3IONS-SCNC: 7 MMOL/L (ref 7–15)
BUN SERPL-MCNC: 36.1 MG/DL (ref 8–23)
CALCIUM SERPL-MCNC: 9.6 MG/DL (ref 8.8–10.4)
CHLORIDE SERPL-SCNC: 99 MMOL/L (ref 98–107)
CREAT SERPL-MCNC: 1.03 MG/DL (ref 0.51–0.95)
EGFRCR SERPLBLD CKD-EPI 2021: 55 ML/MIN/1.73M2
GLUCOSE SERPL-MCNC: 144 MG/DL (ref 70–99)
HCO3 SERPL-SCNC: 28 MMOL/L (ref 22–29)
INR (EXTERNAL): 3.6
POTASSIUM SERPL-SCNC: 5.1 MMOL/L (ref 3.4–5.3)
SODIUM SERPL-SCNC: 134 MMOL/L (ref 135–145)

## 2024-07-22 PROCEDURE — 99310 SBSQ NF CARE HIGH MDM 45: CPT | Performed by: NURSE PRACTITIONER

## 2024-07-22 PROCEDURE — P9604 ONE-WAY ALLOW PRORATED TRIP: HCPCS | Mod: ORL | Performed by: NURSE PRACTITIONER

## 2024-07-22 PROCEDURE — 80048 BASIC METABOLIC PNL TOTAL CA: CPT | Mod: ORL | Performed by: NURSE PRACTITIONER

## 2024-07-22 PROCEDURE — 36415 COLL VENOUS BLD VENIPUNCTURE: CPT | Mod: ORL | Performed by: NURSE PRACTITIONER

## 2024-07-22 NOTE — TELEPHONE ENCOUNTER
[place] GERIATRIC SERVICES TELEPHONE ENCOUNTER    Chief Complaint   Patient presents with    Hypoglycemia       Lindapancho Loredo is a 79 year old  (1945),Nurse called today to report: Blood Glucose this evening has been 54 upon HS. 8oz orange juice was given per Registered Nurse. 15 minutes later Blood Glucose still remain 54, therefore per Registered Nurse, she gave another 8oz orange juice and called provider to update. Patient is lethargic and weak. Last Blood Glucose assessment was about 20 minutes ago.     ASSESSMENT/PLAN    Obtain another Blood Glucose check. If Blood Glucose<70, then give per standing house order of 15gm Glucagon gel. Recheck Blood Glucose Q 15 minutes until Blood Glucose>70. If Blood Glucose still remains low in 15 minutes despite oral glucagon, then call back for further order clarification.     Electronically signed by:   MOE Fitch CNP

## 2024-07-22 NOTE — PROGRESS NOTES
ANTICOAGULATION MANAGEMENT     Linda Loredo 79 year old female is on warfarin with supratherapeutic INR result. (Goal INR 2.0-3.0)    Recent labs: (last 7 days)     07/22/24  0000   INR 3.6       ASSESSMENT     Source(s): Chart Review and Home Care/Facility Nurse     Warfarin doses taken: Warfarin taken as instructed  Diet: No new diet changes identified  Medication/supplement changes:  Augmentin last dose yesterday 7/21/24  Torsemide was held during inpatient - resumed back at 60 mg daily on 7/20/24 - which may be increasing INR today.  New illness, injury, or hospitalization: No  Signs or symptoms of bleeding or clotting: No  Previous result: Subtherapeutic  Additional findings: None       PLAN     Recommended plan for ongoing change(s) affecting INR     Dosing Instructions: decrease your warfarin dose (7.1% change) with next INR in 3 days       Summary  As of 7/22/2024      Full warfarin instructions:  1.5 mg every Mon; 3 mg all other days   Next INR check:  7/25/2024               Telephone call with [facility] nurse (medical care for seniors) who agrees to plan and repeated back plan correctly    Orders given to  Homecare nurse/facility to recheck    Education provided: Please call back if any changes to your diet, medications or how you've been taking warfarin    Plan made per [facility] anticoagulation protocol    Kait Hannah RN  Anticoagulation Clinic  7/22/2024    _______________________________________________________________________     Anticoagulation Episode Summary       Current INR goal:  2.0-3.0   TTR:  --   Target end date:  Indefinite   Send INR reminders to:  [facility] (U/LTC/[facility])    Indications    Chronic atrial fibrillation (H) [I48.20]             Comments:               Anticoagulation Care Providers       Provider Role Specialty Phone number    Abby Jacinto NP Referring Nurse Practitioner [phone]

## 2024-07-22 NOTE — ADDENDUM NOTE
Addendum  created 07/22/24 1417 by Dana Farias APRN CRNA    Intraprocedure Event edited, Intraprocedure Staff edited

## 2024-07-22 NOTE — LETTER
" 7/22/2024      Linda Loredo  [address] [address]        [facility] GERIATRICS    Chief Complaint   Patient presents with     RECHECK     HPI:  Linda Loredo is a 79 year old  (1945), who is being seen today for an episodic care visit at: [facility]) [[id number]]. Today's concern is: CHF, DM2, hypoglycemia, venous stasis ulcer.    Linda has a history of DM2, chronic pain on methadone, chronic right sided CHF and lympehdema, L BKA (10/2023), fibromyalgia, HTN, HLD, anemia, BERLIN, hyperparathyroidism, CKD3,a fib, who is seen today for multiple acute concerns.   She lives in [facility] and was found to have AMS and brought to ED found to have acute RLE cellulitis s/s to chronic venous stasis ulcer. She completed IV zosyn and went to OR on 7/14.     TODAY: Pat is seen today at request of nursing for hypoglycemia occurring over the weekend and last night with a low of 54; she reports eating \"fine\". Per chart review, her tresiba is typically 34 units daily at home, this was increased to 40 units in the hospital due to hyperglycemia and A1c 9.9; she is verbally frustrated that \"all these people who don't know me keep changing my medications\"; She has been asking nursing to only give her 34 units of her long acting insulin. With the low last evening and lower BG over the last 48 hours: 52, 106, 190, 202, 142; Im okay to resume her home dose with close monitoring; dietician is also aware and will be changing her diet to consistent carbohydrate.     She also appears fluid overloaded today with increased edema in L BKA and RLE; increased redness to stump site. Weights 219 -->230lbs.  Diuretics held during hospitalization and were supposed to be resumed on Friday, however worldwide IT outage shut down their system and she received her first torsemide dose of 60mg today. Will await her response to torsemide before considering metolazone resumption. " "    I also discussed her LE wounds. RLE is wrapped in kerlix with yellow drainage to dressing which has not been changed today. Nursing attempted to and she refused x1 while I was here. She reports quite a bit of pain which is chronic. Does not want me to palpate the RLE even lightly.   Her mood is labile; she initially is quite agitated and frustrated, we spoke for quite some time and she then apologized and reports overall frustration with her health and the healthcare system which is understandable.     Allergies, and PMH/PSH reviewed in [facility] today.  REVIEW OF SYSTEMS:  4 point ROS including Respiratory, CV, GI and , other than that noted in the HPI,  is negative    Objective:   BP 98/60   Pulse 114   Temp 97.7  F (36.5  C)   Resp 16   Ht 1.676 m (5' 6\")   Wt 104.5 kg (230 lb 6.4 oz)   SpO2 100%   BMI 37.19 kg/m    General appearance: alert, sometimes uncooperative.   Lungs: respirations unlabored, no wheezing or rales where heard in upper lobes; refused LL auscultation.   Cardiovascular: Regular rate and rhythm.   ABDOMEN: Globular and soft, non tender.    Extremities: extremities: L BKA with stump redness, RLE +3 edema, pitting, firm, red where seen around edges of kerlix.   Neurologic: oriented. No focal deficits.   Psych: Agitated, labile.     Recent labs in [facility] reviewed by me today.     Assessment/Plan:  1. Type 2 diabetes mellitus with complication, with long-term current use of insulin (H)    2. Ulcer of right lower extremity with fat layer exposed (H)    3. Other chronic pain    4. Chronic right-sided heart failure (H)    5. Venous stasis      T2DM: with hyper and hypoglycemia. Pat would like her previous tresiba orders honored here. A1c 9.9. Dietician to see.   RLE venous stasis ulcer: Unable to view entire wound today, however given her pain and history, concern for new infection. Will attempt to view wound when I'm back here tomorrow and order cbc w/diff for Wednesday. No fevers, chills. "   Chronic pain: discussed; on methadone. No changes. Frustrated with timing by nursing staff.  CHF: Toresemide just resumed today; weights 219 --> 230lb. Appears slightly overlaoded today, will await response to resumption of diuretics. Metolazone still on hold. BMP reviewed and stable at Cr 1.03.   Venous stasis ulcers : chronic, as above. Very high risk for recurrent cellulitis given refusal of cares.     MED REC REQUIRED  Post Medication Reconciliation Status:  Discharge medications reconciled, continue medications without change      Orders:  -Reduce tresiba to 34 units once daily.   -ConCarb diet.   -CBC with diff on Wednesday.   -BMP on Wednesday.   -Daily weights: call for >3lbs/24 hours and >5lbs in one week.     >45 minutes of face to face time with patient, discussing care and wounds with nursing staff, reviewing past medical record and current and past labs.     Electronically signed by: Reagan Aparicio CNP       Sincerely,        Reagan Aparicio CNP

## 2024-07-22 NOTE — PROGRESS NOTES
"[facility] GERIATRICS    Chief Complaint   Patient presents with    RECHECK     HPI:  Linda Loredo is a 79 year old  (1945), who is being seen today for an episodic care visit at: [facility] [[id number]]. Today's concern is: CHF, DM2, hypoglycemia, venous stasis ulcer.    Linda has a history of DM2, chronic pain on methadone, chronic right sided CHF and lympehdema, L BKA (10/2023), fibromyalgia, HTN, HLD, anemia, BERLIN, hyperparathyroidism, CKD3,a fib, who is seen today for multiple acute concerns.   She lives in [facility] and was found to have AMS and brought to ED found to have acute RLE cellulitis s/s to chronic venous stasis ulcer. She completed IV zosyn and went to OR on 7/14.     TODAY: Margaret is seen today at request of nursing for hypoglycemia occurring over the weekend and last night with a low of 54; she reports eating \"fine\". Per chart review, her tresiba is typically 34 units daily at home, this was increased to 40 units in the hospital due to hyperglycemia and A1c 9.9; she is verbally frustrated that \"all these people who don't know me keep changing my medications\"; She has been asking nursing to only give her 34 units of her long acting insulin. With the low last evening and lower BG over the last 48 hours: 52, 106, 190, 202, 142; Im okay to resume her home dose with close monitoring; dietician is also aware and will be changing her diet to consistent carbohydrate.     She also appears fluid overloaded today with increased edema in L BKA and RLE; increased redness to stump site. Weights 219 -->230lbs.  Diuretics held during hospitalization and were supposed to be resumed on Friday, however worldwide IT outage shut down their system and she received her first torsemide dose of 60mg today. Will await her response to torsemide before considering metolazone resumption.     I also discussed her LE wounds. RLE is wrapped in kerlix with yellow drainage to dressing which " "has not been changed today. Nursing attempted to and she refused x1 while I was here. She reports quite a bit of pain which is chronic. Does not want me to palpate the RLE even lightly.   Her mood is labile; she initially is quite agitated and frustrated, we spoke for quite some time and she then apologized and reports overall frustration with her health and the healthcare system which is understandable.     Allergies, and PMH/PSH reviewed in [facility] today.  REVIEW OF SYSTEMS:  4 point ROS including Respiratory, CV, GI and , other than that noted in the HPI,  is negative    Objective:   BP 98/60   Pulse 114   Temp 97.7  F (36.5  C)   Resp 16   Ht 1.676 m (5' 6\")   Wt 104.5 kg (230 lb 6.4 oz)   SpO2 100%   BMI 37.19 kg/m    General appearance: alert, sometimes uncooperative.   Lungs: respirations unlabored, no wheezing or rales where heard in upper lobes; refused LL auscultation.   Cardiovascular: Regular rate and rhythm.   ABDOMEN: Globular and soft, non tender.    Extremities: extremities: L BKA with stump redness, RLE +3 edema, pitting, firm, red where seen around edges of kerlix.   Neurologic: oriented. No focal deficits.   Psych: Agitated, labile.     Recent labs in [facility] reviewed by me today.     Assessment/Plan:  1. Type 2 diabetes mellitus with complication, with long-term current use of insulin (H)    2. Ulcer of right lower extremity with fat layer exposed (H)    3. Other chronic pain    4. Chronic right-sided heart failure (H)    5. Venous stasis      T2DM: with hyper and hypoglycemia. Pat would like her previous tresiba orders honored here. A1c 9.9. Dietician to see.   RLE venous stasis ulcer: Unable to view entire wound today, however given her pain and history, concern for new infection. Will attempt to view wound when I'm back here tomorrow and order cbc w/diff for Wednesday. No fevers, chills.   Chronic pain: discussed; on methadone. No changes. Frustrated with timing by nursing staff.  CHF: " Toresemide just resumed today; weights 219 --> 230lb. Appears slightly overlaoded today, will await response to resumption of diuretics. Metolazone still on hold. BMP reviewed and stable at Cr 1.03.   Venous stasis ulcers : chronic, as above. Very high risk for recurrent cellulitis given refusal of cares.     MED REC REQUIRED  Post Medication Reconciliation Status:  Discharge medications reconciled, continue medications without change      Orders:  -Reduce tresiba to 34 units once daily.   -ConCarb diet.   -CBC with diff on Wednesday.   -BMP on Wednesday.   -Daily weights: call for >3lbs/24 hours and >5lbs in one week.     >45 minutes of face to face time with patient, discussing care and wounds with nursing staff, reviewing past medical record and current and past labs.     Electronically signed by: Reagan Aparicio CNP

## 2024-07-23 ENCOUNTER — LAB REQUISITION (OUTPATIENT)
Dept: LAB | Facility: CLINIC | Age: 79
End: 2024-07-23
Payer: COMMERCIAL

## 2024-07-23 ENCOUNTER — TELEPHONE (OUTPATIENT)
Dept: VASCULAR SURGERY | Facility: CLINIC | Age: 79
End: 2024-07-23

## 2024-07-23 ENCOUNTER — TRANSITIONAL CARE UNIT VISIT (OUTPATIENT)
Dept: GERIATRICS | Facility: CLINIC | Age: 79
End: 2024-07-23
Payer: COMMERCIAL

## 2024-07-23 VITALS
RESPIRATION RATE: 20 BRPM | SYSTOLIC BLOOD PRESSURE: 119 MMHG | HEIGHT: 66 IN | TEMPERATURE: 98 F | HEART RATE: 93 BPM | WEIGHT: 230.4 LBS | BODY MASS INDEX: 37.03 KG/M2 | OXYGEN SATURATION: 96 % | DIASTOLIC BLOOD PRESSURE: 75 MMHG

## 2024-07-23 DIAGNOSIS — A41.9 SEPSIS, UNSPECIFIED ORGANISM (H): ICD-10-CM

## 2024-07-23 DIAGNOSIS — L97.909 VENOUS ULCER (H): Primary | ICD-10-CM

## 2024-07-23 DIAGNOSIS — R45.1 AGITATION: ICD-10-CM

## 2024-07-23 DIAGNOSIS — I87.8 VENOUS STASIS: ICD-10-CM

## 2024-07-23 DIAGNOSIS — I83.009 VENOUS ULCER (H): Primary | ICD-10-CM

## 2024-07-23 DIAGNOSIS — E11.8 TYPE 2 DIABETES MELLITUS WITH COMPLICATION, WITH LONG-TERM CURRENT USE OF INSULIN (H): ICD-10-CM

## 2024-07-23 DIAGNOSIS — Z79.4 TYPE 2 DIABETES MELLITUS WITH COMPLICATION, WITH LONG-TERM CURRENT USE OF INSULIN (H): ICD-10-CM

## 2024-07-23 DIAGNOSIS — I50.812 CHRONIC RIGHT-SIDED HEART FAILURE (H): ICD-10-CM

## 2024-07-23 DIAGNOSIS — G89.29 OTHER CHRONIC PAIN: Chronic | ICD-10-CM

## 2024-07-23 PROCEDURE — 99310 SBSQ NF CARE HIGH MDM 45: CPT | Performed by: NURSE PRACTITIONER

## 2024-07-23 NOTE — TELEPHONE ENCOUNTER
JOSE Chang calling from [facility] with update regarding pt's wound. Pt's wound has distinct odor in addition to increased redness, concern for pseudomonas. Pt is reluctant to do anything different until seeing Dr. Morley.     Pt has appointment scheduled on 8/1/24.  No available open clinic spot to move appointment up at this time. Charlene is requesting call back with advice/plan.    JOSE Chang  [phone]

## 2024-07-23 NOTE — PROGRESS NOTES
[facility] GERIATRICS    Chief Complaint   Patient presents with    RECHECK     HPI:  Linda Loredo is a 79 year old  (1945), who is being seen today for an episodic care visit at: [facility]) [[id number]]. Today's concern is: CHF, DM2, hypoglycemia, venous stasis ulcer.    Linda has a history of DM2, chronic pain on methadone, chronic right sided CHF and lympehdema, L BKA (10/2023), fibromyalgia, HTN, HLD, anemia, BERLIN, hyperparathyroidism, CKD3,a fib, who is seen today for multiple acute concerns.   She lives in [facility] and was found to have AMS and brought to ED found to have acute RLE cellulitis s/s to chronic venous stasis ulcer. She completed IV zosyn and went to OR on 7/14.     TODAY: Margaret is seen again today per her request as well as to follow up on fluid status/CHF and wounds. When I enter the room I remind her that I'm here at her request and she then tells me she has nothing to say; we discussed her torsemide and how she has less swelling in the L BKA and RLE however she has not had her weight check today.   Her wound care has not been done for 48 hours as she refused yesterday and she is quite upset about this. I did assist nursing with undoing her dressing to view the ulcers. There is a large area of fatty tissue exposed with yellow slough, the dressings are saturated and the R medial calf has copious blue/green, malodorous drainage, concern for pseudomonas.   She is very agitated today, demanding and raising her voice at staff. Discussed with nurse manager recommendation to enter room 2 persons at a time to reduce risk for accusations and help alleviate 1:1 conflict. ACP would also be helpful if patient willing.     Allergies, and PMH/PSH reviewed in [facility] today.  REVIEW OF SYSTEMS:  4 point ROS including Respiratory, CV, GI and , other than that noted in the HPI,  is negative    Objective:   /75   Pulse 93   Temp 98  F (36.7  C)   Resp 20   Ht  "1.676 m (5' 6\")   Wt 104.5 kg (230 lb 6.4 oz)   SpO2 96%   BMI 37.19 kg/m    General appearance: alert, sometimes uncooperative.   Lungs: respirations unlabored, no wheezing or rales where heard in upper lobes; refused LL auscultation.   Cardiovascular: Regular rate and rhythm.   ABDOMEN: Globular and soft, non tender.    Extremities: extremities: L BKA with stump redness, RLE +3 edema, pitting, firm, ulcer with large area of fatty tissue and purulent drainage.   Neurologic: oriented. No focal deficits.   Psych: Agitated, labile.     Recent labs in [facility] reviewed by me today.     Assessment/Plan:  1. Venous ulcer (H)    2. Type 2 diabetes mellitus with complication, with long-term current use of insulin (H)    3. Other chronic pain    4. Chronic right-sided heart failure (H)    5. Venous stasis    6. Agitation      RLE venous stasis ulcer: Blue/green, malodorous drainage. Suspicious for pseudomonas. Patient was very angry at the idea of changing any orders; due to that, will have nursing update her wound clinic- appt on 7/29/24. I did add 1% acetic acid wash for now. Continue current dressings.   T2DM: with hyper and hypoglycemia. No further hypoglycemia. A1c 9.9. Dietician to see. Consistent Carb diet.   Chronic pain: discussed; on methadone. No changes.Continues to be frustrated with when methadone is given in the TCU.   CHF: Toresemide just resumed yesterday and she looks to have dropped some fluid; weights 219 --> 230lb. No reweigh yet today due to pt refusal.  Metolazone still on hold. Repeat BMP.   Venous stasis ulcers : chronic, as above. Very high risk for recurrent cellulitis given refusal of cares; refused dressing change yesterday so current dressing has been on for >48 hours.   Agitation: Has dx of anxiety and MDD, no current medications. Very difficult to assist with cares. Recommend ACP consult.       Orders:  -Add on CRP tomorrows labs.  -Start acetic acid 1% instead of Vashe wash to RLE, apply " liberally, dab dry, and continue current dressing with ABD pad and kerlix.   -ACP consult.     >45 minutes of face to face time with patient, nursing staff, nurse manager and brief discussion with care coordinator from .     Electronically signed by: Reagan Aparicio CNP

## 2024-07-23 NOTE — LETTER
7/23/2024      Linda Loredo  [address] [address]         [facility] GERIATRICS    Chief Complaint   Patient presents with     RECHECK     HPI:  Linda Loredo is a 79 year old  (1945), who is being seen today for an episodic care visit at: [facility]) [[id number]]. Today's concern is: CHF, DM2, hypoglycemia, venous stasis ulcer.    Linda has a history of DM2, chronic pain on methadone, chronic right sided CHF and lympehdema, L BKA (10/2023), fibromyalgia, HTN, HLD, anemia, BERLIN, hyperparathyroidism, CKD3,a fib, who is seen today for multiple acute concerns.   She lives in [facility] and was found to have AMS and brought to ED found to have acute RLE cellulitis s/s to chronic venous stasis ulcer. She completed IV zosyn and went to OR on 7/14.     TODAY: Pat is seen again today per her request as well as to follow up on fluid status/CHF and wounds. When I enter the room I remind her that I'm here at her request and she then tells me she has nothing to say; we discussed her torsemide and how she has less swelling in the L BKA and RLE however she has not had her weight check today.   Her wound care has not been done for 48 hours as she refused yesterday and she is quite upset about this. I did assist nursing with undoing her dressing to view the ulcers. There is a large area of fatty tissue exposed with yellow slough, the dressings are saturated and the R medial calf has copious blue/green, malodorous drainage, concern for pseudomonas.   She is very agitated today, demanding and raising her voice at staff. Discussed with nurse manager recommendation to enter room 2 persons at a time to reduce risk for accusations and help alleviate 1:1 conflict. ACP would also be helpful if patient willing.     Allergies, and PMH/PSH reviewed in [facility] today.  REVIEW OF SYSTEMS:  4 point ROS including Respiratory, CV, GI and , other than that noted in the HPI,  " is negative    Objective:   /75   Pulse 93   Temp 98  F (36.7  C)   Resp 20   Ht 1.676 m (5' 6\")   Wt 104.5 kg (230 lb 6.4 oz)   SpO2 96%   BMI 37.19 kg/m    General appearance: alert, sometimes uncooperative.   Lungs: respirations unlabored, no wheezing or rales where heard in upper lobes; refused LL auscultation.   Cardiovascular: Regular rate and rhythm.   ABDOMEN: Globular and soft, non tender.    Extremities: extremities: L BKA with stump redness, RLE +3 edema, pitting, firm, ulcer with large area of fatty tissue and purulent drainage.   Neurologic: oriented. No focal deficits.   Psych: Agitated, labile.     Recent labs in [facility] reviewed by me today.     Assessment/Plan:  1. Venous ulcer (H)    2. Type 2 diabetes mellitus with complication, with long-term current use of insulin (H)    3. Other chronic pain    4. Chronic right-sided heart failure (H)    5. Venous stasis    6. Agitation      RLE venous stasis ulcer: Blue/green, malodorous drainage. Suspicious for pseudomonas. Patient was very angry at the idea of changing any orders; due to that, will have nursing update her wound clinic- appt on 7/29/24. I did add 1% acetic acid wash for now. Continue current dressings.   T2DM: with hyper and hypoglycemia. No further hypoglycemia. A1c 9.9. Dietician to see. Consistent Carb diet.   Chronic pain: discussed; on methadone. No changes.Continues to be frustrated with when methadone is given in the TCU.   CHF: Toresemide just resumed yesterday and she looks to have dropped some fluid; weights 219 --> 230lb. No reweigh yet today due to pt refusal.  Metolazone still on hold. Repeat BMP.   Venous stasis ulcers : chronic, as above. Very high risk for recurrent cellulitis given refusal of cares; refused dressing change yesterday so current dressing has been on for >48 hours.   Agitation: Has dx of anxiety and MDD, no current medications. Very difficult to assist with cares. Recommend ACP consult. "       Orders:  -Add on CRP tomorrows labs.  -Start acetic acid 1% instead of Vashe wash to RLE, apply liberally, dab dry, and continue current dressing with ABD pad and kerlix.   -ACP consult.     >45 minutes of face to face time with patient, nursing staff, nurse manager and brief discussion with care coordinator from .     Electronically signed by: Reagan Aparicio CNP       Sincerely,        Reagan Aparicio, CNP

## 2024-07-23 NOTE — TELEPHONE ENCOUNTER
LMTCB. Cannot give any recommendations on wound care as patient has not been seen in clinic since 3/2024 and has since had surgery on that leg. They will need to call and discuss with surgeon who did her debridement.

## 2024-07-24 ENCOUNTER — LAB REQUISITION (OUTPATIENT)
Dept: LAB | Facility: CLINIC | Age: 79
End: 2024-07-24
Payer: COMMERCIAL

## 2024-07-24 ENCOUNTER — TRANSITIONAL CARE UNIT VISIT (OUTPATIENT)
Dept: GERIATRICS | Facility: CLINIC | Age: 79
End: 2024-07-24
Payer: COMMERCIAL

## 2024-07-24 ENCOUNTER — TELEPHONE (OUTPATIENT)
Dept: SURGERY | Facility: CLINIC | Age: 79
End: 2024-07-24

## 2024-07-24 VITALS
HEART RATE: 105 BPM | RESPIRATION RATE: 20 BRPM | BODY MASS INDEX: 36.96 KG/M2 | WEIGHT: 230 LBS | DIASTOLIC BLOOD PRESSURE: 74 MMHG | SYSTOLIC BLOOD PRESSURE: 108 MMHG | HEIGHT: 66 IN | OXYGEN SATURATION: 98 % | TEMPERATURE: 98 F

## 2024-07-24 DIAGNOSIS — Z79.4 TYPE 2 DIABETES MELLITUS WITH COMPLICATION, WITH LONG-TERM CURRENT USE OF INSULIN (H): Chronic | ICD-10-CM

## 2024-07-24 DIAGNOSIS — R46.89 BEHAVIOR CONCERN: ICD-10-CM

## 2024-07-24 DIAGNOSIS — E11.8 TYPE 2 DIABETES MELLITUS WITH COMPLICATION, WITH LONG-TERM CURRENT USE OF INSULIN (H): Chronic | ICD-10-CM

## 2024-07-24 DIAGNOSIS — L97.215: Primary | ICD-10-CM

## 2024-07-24 DIAGNOSIS — I87.2: Primary | ICD-10-CM

## 2024-07-24 DIAGNOSIS — L08.9 LOCAL INFECTION OF THE SKIN AND SUBCUTANEOUS TISSUE, UNSPECIFIED: ICD-10-CM

## 2024-07-24 DIAGNOSIS — G89.29 OTHER CHRONIC PAIN: Chronic | ICD-10-CM

## 2024-07-24 LAB
BASOPHILS # BLD AUTO: 0 10E3/UL (ref 0–0.2)
BASOPHILS NFR BLD AUTO: 0 %
CRP SERPL HS-MCNC: 89 MG/L
CRP SERPL-MCNC: 68.8 MG/L
EOSINOPHIL # BLD AUTO: 0.2 10E3/UL (ref 0–0.7)
EOSINOPHIL NFR BLD AUTO: 3 %
ERYTHROCYTE [DISTWIDTH] IN BLOOD BY AUTOMATED COUNT: 16.1 % (ref 10–15)
HCT VFR BLD AUTO: 33.2 % (ref 35–47)
HGB BLD-MCNC: 10.6 G/DL (ref 11.7–15.7)
IMM GRANULOCYTES # BLD: 0 10E3/UL
IMM GRANULOCYTES NFR BLD: 0 %
LYMPHOCYTES # BLD AUTO: 0.9 10E3/UL (ref 0.8–5.3)
LYMPHOCYTES NFR BLD AUTO: 10 %
MCH RBC QN AUTO: 29.8 PG (ref 26.5–33)
MCHC RBC AUTO-ENTMCNC: 31.9 G/DL (ref 31.5–36.5)
MCV RBC AUTO: 93 FL (ref 78–100)
MONOCYTES # BLD AUTO: 0.6 10E3/UL (ref 0–1.3)
MONOCYTES NFR BLD AUTO: 7 %
NEUTROPHILS # BLD AUTO: 7.4 10E3/UL (ref 1.6–8.3)
NEUTROPHILS NFR BLD AUTO: 80 %
NRBC # BLD AUTO: 0 10E3/UL
NRBC BLD AUTO-RTO: 0 /100
PLATELET # BLD AUTO: 301 10E3/UL (ref 150–450)
RBC # BLD AUTO: 3.56 10E6/UL (ref 3.8–5.2)
WBC # BLD AUTO: 9.2 10E3/UL (ref 4–11)

## 2024-07-24 PROCEDURE — 99309 SBSQ NF CARE MODERATE MDM 30: CPT | Performed by: NURSE PRACTITIONER

## 2024-07-24 PROCEDURE — 36415 COLL VENOUS BLD VENIPUNCTURE: CPT | Mod: ORL | Performed by: NURSE PRACTITIONER

## 2024-07-24 PROCEDURE — 85025 COMPLETE CBC W/AUTO DIFF WBC: CPT | Mod: ORL | Performed by: NURSE PRACTITIONER

## 2024-07-24 PROCEDURE — 86140 C-REACTIVE PROTEIN: CPT | Mod: ORL | Performed by: NURSE PRACTITIONER

## 2024-07-24 PROCEDURE — 86141 C-REACTIVE PROTEIN HS: CPT | Mod: ORL | Performed by: NURSE PRACTITIONER

## 2024-07-24 PROCEDURE — 87186 SC STD MICRODIL/AGAR DIL: CPT | Mod: ORL | Performed by: NURSE PRACTITIONER

## 2024-07-24 PROCEDURE — P9604 ONE-WAY ALLOW PRORATED TRIP: HCPCS | Mod: ORL | Performed by: NURSE PRACTITIONER

## 2024-07-24 NOTE — TELEPHONE ENCOUNTER
Per [facility] covering surg team; Patient is not under Surgery service.  Recommendation to determine if PCP/inpatient/ facility provider or if patient has not transitioned to a managing provider: would then need to consider ER.  Left Message for Charlene to call back and discuss.  Sandra ELLISON   [facility]

## 2024-07-24 NOTE — PROGRESS NOTES
[facility] GERIATRICS      Code Status:  FULL CODE  Visit Type: RECHECK     Facility:   [facility] ([facility]) [[id number]]         History of Present Illness: Linda Loredo is a 79 year old female with a past medical history for CHF, DM2, left AKA, chronic venous stasis ulcer of RLE.      Today, nursing requests a visit due ongoing issues with wound.  Yesterday, a colleague saw the patient and was concerned with green drainage for pseudomonas.  Acetic acid wash was ordered however has not arrived.  Wound care yesterday was done with petroleum guaze and ABD due to high exudate wounds.  Today, patient has lots of pain during dressing removal.  Dressing is dried on and sticking to the wound bed even though increased sero/sang drainage.  Legs appear inflamed but not infected with significant edema.      During the visit she is very demanding and not want to answer my questions.  When I discuss pain control she tends to ruminate however doesn't really make a decision of how to we can treat her pain prior to wound care.  Nursing reports she is noncompliant of treatments and medications and will often refuse methadone (which she does during my visit).  Nursing reports she will have significant incontinence of the bed and won't allow nursing to change the linens timely.     Nursing has a call to surgery without a return call as of yet.     Current Outpatient Medications   Medication Sig Dispense Refill    acetaminophen (TYLENOL) 500 MG tablet Take 2 tablets (1,000 mg) by mouth every 8 hours as needed for mild pain Pain 1-5/10      atorvastatin (LIPITOR) 40 MG tablet Take 1 tablet (40 mg) by mouth every evening      insulin degludec (TRESIBA) 200 UNIT/ML pen Inject 40 Units subcutaneously every morning Hold for blood glucose less than 100      melatonin 1 MG TABS tablet Take 1 tablet (1 mg) by mouth nightly as needed for  "sleep      Menthol, Topical Analgesic, (BIOFREEZE EX) Apply 1 Application topically 3 times daily as needed Apply to neck and shoulders      methadone (DOLOPHINE) 5 MG tablet TAKE 1 TABLET BY MOUTH FOUR TIMES DAILY 120 tablet 0    miconazole (MICATIN) 2 % external powder Apply topically 2 times daily      mineral oil-hydrophilic petrolatum (AQUAPHOR) external ointment Apply to left thigh wound BID      Multiple Vitamin (TAB-A-YUN) TABS Take 1 tablet by mouth daily at 2 pm      ondansetron (ZOFRAN ODT) 4 MG ODT tab Take 1 tablet (4 mg) by mouth every 6 hours as needed for nausea or vomiting      polyethylene glycol (MIRALAX) 17 GM/Dose powder Take 17 g by mouth 2 times daily as needed for constipation      potassium chloride ER (K-TAB) 20 MEQ CR tablet Take 20 mEq by mouth 2 times daily      senna-docusate (SENOKOT-S/PERICOLACE) 8.6-50 MG tablet Take 1 tablet by mouth 2 times daily as needed for constipation      torsemide (DEMADEX) 20 MG tablet Take 60 mg by mouth daily      WARFARIN SODIUM PO Take by mouth daily Dosing in coordination with INR results      Zinc oxide 10 % CREA Externally apply topically 3 times daily Apply to coccyx area once per shift after brief changes         Review of Systems   Very difficult to obtain due to resistant behaviors.  Denies fever or chills.       Physical Exam  Vital signs:/74   Pulse 105   Temp 98  F (36.7  C)   Resp 20   Ht 1.676 m (5' 6\")   Wt 104.3 kg (230 lb)   SpO2 98%   BMI 37.12 kg/m     GENERAL APPEARANCE:obese elderly female, yelling out in pain.   HEENT: normocephalic, atraumatic  sclerae anicteric, conjunctivae clear and moist, EOM intact  LUNGS: respiratory effort normal.  ABD: Soft, nondistended   EXTREMITIES: left AKA, significant hard edema of RLE with venous stasis dermatitis and skin changes.   NEURO: Alert and oriented x 3.  Face is symmetric.  SKIN: wound irregular and large to entire right calf and extending to front distal tibial area. Bumpy " granular wound base and macerated periwound Right anterior LE with unroofed blister and serous drainage.   PSYCH: yelling and avoidant.         Labs:    Last Comprehensive Metabolic Panel:  Lab Results   Component Value Date     (L) 07/22/2024    POTASSIUM 5.1 07/22/2024    CHLORIDE 99 07/22/2024    CO2 28 07/22/2024    ANIONGAP 7 07/22/2024     (H) 07/22/2024    BUN 36.1 (H) 07/22/2024    CR 1.03 (H) 07/22/2024    GFRESTIMATED 55 (L) 07/22/2024    ZAKIA 9.6 07/22/2024       Lab Results   Component Value Date    WBC 9.2 07/24/2024    WBC 9.4 02/22/2008     Lab Results   Component Value Date    RBC 3.56 07/24/2024    RBC 4.19 02/22/2008     Lab Results   Component Value Date    HGB 10.6 07/24/2024    HGB 12.6 02/22/2008     Lab Results   Component Value Date    HCT 33.2 07/24/2024    HCT 35.6 02/22/2008     Lab Results   Component Value Date    MCV 93 07/24/2024    MCV 85 02/22/2008     Lab Results   Component Value Date    MCH 29.8 07/24/2024    MCH 30.1 02/22/2008     Lab Results   Component Value Date    MCHC 31.9 07/24/2024    MCHC 35.4 02/22/2008     Lab Results   Component Value Date    RDW 16.1 07/24/2024    RDW Test not performed 02/22/2008     Lab Results   Component Value Date     07/24/2024     02/22/2008           Assessment/Plan:  Venous stasis ulcer of right calf with muscle involvement without evidence of necrosis without varicose veins (H)  Wound complicated by patient's compliance.  Does not appear infected and drainage yesterday was likely related to wound care not done x 2 days.  Wound culture obtained today.  Santyl and acetic acid dc'd as there is no longer slough and acetic acid will be painful for patient.  Wound care of RLE, vashe soaked gauze set to wound bed for 3 minutes, then apply 2 layers of xeroform to wound bed, myrna to periwound, cover with suprasorb and affix with kerlix.  Compression is ideal however patient states she doesn't tolerate.     Start Methadone  TID prn in addition to scheduled Methodone for chronic pain.      Type 2 diabetes mellitus with complication, with long-term current use of insulin (H)  BS fluctuate, continue with Tresiba,     Other chronic pain  Continue with home Methadone 5mg QID.  Monitor.     Behavior concern  Demanding, refusing and disgruntle behavior towards staff likely related to adjustment disorder.  Continue to monitor.  On no antidepressants at this time.       35 total minutes assessing wounds and coordinating the above plan of care with patient and nursing.     Electronically signed by:Doris Junior NP

## 2024-07-24 NOTE — TELEPHONE ENCOUNTER
Charlene was updated- Cannot give any recommendations on wound care as patient has not been seen in clinic since 3/2024 and has since had surgery on that leg. They will need to call and discuss with surgeon who did her debridement.     She had no further questions.

## 2024-07-24 NOTE — CONFIDENTIAL NOTE
Per Surgery Covering team:  This patient is not appropriate for our service.  She needs wound care.  We do not provide that.  Her surgical intervention was a debridement only. We will not follow or provide wound care.  She will need to follow up with her primary care physician, or wound care.    Thank you,  Cammie

## 2024-07-24 NOTE — LETTER
7/24/2024      Linda Loredo  [address] [address]                                                                                           [facility] GERIATRICS      Code Status:  FULL CODE  Visit Type: RECHECK     Facility:   [facility] () [[id number]]         History of Present Illness: Linda Loredo is a 79 year old female with a past medical history for CHF, DM2, left AKA, chronic venous stasis ulcer of RLE.      Today, nursing requests a visit due ongoing issues with wound.  Yesterday, a colleague saw the patient and was concerned with green drainage for pseudomonas.  Acetic acid wash was ordered however has not arrived.  Wound care yesterday was done with petroleum guaze and ABD due to high exudate wounds.  Today, patient has lots of pain during dressing removal.  Dressing is dried on and sticking to the wound bed even though increased sero/sang drainage.  Legs appear inflamed but not infected with significant edema.      During the visit she is very demanding and not want to answer my questions.  When I discuss pain control she tends to ruminate however doesn't really make a decision of how to we can treat her pain prior to wound care.  Nursing reports she is noncompliant of treatments and medications and will often refuse methadone (which she does during my visit).  Nursing reports she will have significant incontinence of the bed and won't allow nursing to change the linens timely.     Nursing has a call to surgery without a return call as of yet.     Current Outpatient Medications   Medication Sig Dispense Refill     acetaminophen (TYLENOL) 500 MG tablet Take 2 tablets (1,000 mg) by mouth every 8 hours as needed for mild pain Pain 1-5/10       atorvastatin (LIPITOR) 40 MG tablet Take 1 tablet (40 mg) by mouth every evening       insulin degludec (TRESIBA) 200 UNIT/ML pen Inject 40 Units subcutaneously every morning Hold for blood glucose  "less than 100       melatonin 1 MG TABS tablet Take 1 tablet (1 mg) by mouth nightly as needed for sleep       Menthol, Topical Analgesic, (BIOFREEZE EX) Apply 1 Application topically 3 times daily as needed Apply to neck and shoulders       methadone (DOLOPHINE) 5 MG tablet TAKE 1 TABLET BY MOUTH FOUR TIMES DAILY 120 tablet 0     miconazole (MICATIN) 2 % external powder Apply topically 2 times daily       mineral oil-hydrophilic petrolatum (AQUAPHOR) external ointment Apply to left thigh wound BID       Multiple Vitamin (TAB-A-YUN) TABS Take 1 tablet by mouth daily at 2 pm       ondansetron (ZOFRAN ODT) 4 MG ODT tab Take 1 tablet (4 mg) by mouth every 6 hours as needed for nausea or vomiting       polyethylene glycol (MIRALAX) 17 GM/Dose powder Take 17 g by mouth 2 times daily as needed for constipation       potassium chloride ER (K-TAB) 20 MEQ CR tablet Take 20 mEq by mouth 2 times daily       senna-docusate (SENOKOT-S/PERICOLACE) 8.6-50 MG tablet Take 1 tablet by mouth 2 times daily as needed for constipation       torsemide (DEMADEX) 20 MG tablet Take 60 mg by mouth daily       WARFARIN SODIUM PO Take by mouth daily Dosing in coordination with INR results       Zinc oxide 10 % CREA Externally apply topically 3 times daily Apply to coccyx area once per shift after brief changes         Review of Systems   Very difficult to obtain due to resistant behaviors.  Denies fever or chills.       Physical Exam  Vital signs:/74   Pulse 105   Temp 98  F (36.7  C)   Resp 20   Ht 1.676 m (5' 6\")   Wt 104.3 kg (230 lb)   SpO2 98%   BMI 37.12 kg/m     GENERAL APPEARANCE:obese elderly female, yelling out in pain.   HEENT: normocephalic, atraumatic  sclerae anicteric, conjunctivae clear and moist, EOM intact  LUNGS: respiratory effort normal.  ABD: Soft, nondistended   EXTREMITIES: left AKA, significant hard edema of RLE with venous stasis dermatitis and skin changes.   NEURO: Alert and oriented x 3.  Face is " symmetric.  SKIN: wound irregular and large to entire right calf and extending to front distal tibial area. Bumpy granular wound base and macerated periwound Right anterior LE with unroofed blister and serous drainage.   PSYCH: yelling and avoidant.         Labs:    Last Comprehensive Metabolic Panel:  Lab Results   Component Value Date     (L) 07/22/2024    POTASSIUM 5.1 07/22/2024    CHLORIDE 99 07/22/2024    CO2 28 07/22/2024    ANIONGAP 7 07/22/2024     (H) 07/22/2024    BUN 36.1 (H) 07/22/2024    CR 1.03 (H) 07/22/2024    GFRESTIMATED 55 (L) 07/22/2024    ZAKIA 9.6 07/22/2024       Lab Results   Component Value Date    WBC 9.2 07/24/2024    WBC 9.4 02/22/2008     Lab Results   Component Value Date    RBC 3.56 07/24/2024    RBC 4.19 02/22/2008     Lab Results   Component Value Date    HGB 10.6 07/24/2024    HGB 12.6 02/22/2008     Lab Results   Component Value Date    HCT 33.2 07/24/2024    HCT 35.6 02/22/2008     Lab Results   Component Value Date    MCV 93 07/24/2024    MCV 85 02/22/2008     Lab Results   Component Value Date    MCH 29.8 07/24/2024    MCH 30.1 02/22/2008     Lab Results   Component Value Date    MCHC 31.9 07/24/2024    MCHC 35.4 02/22/2008     Lab Results   Component Value Date    RDW 16.1 07/24/2024    RDW Test not performed 02/22/2008     Lab Results   Component Value Date     07/24/2024     02/22/2008           Assessment/Plan:  Venous stasis ulcer of right calf with muscle involvement without evidence of necrosis without varicose veins (H)  Wound complicated by patient's compliance.  Does not appear infected and drainage yesterday was likely related to wound care not done x 2 days.  Wound culture obtained today.  Santyl and acetic acid dc'd as there is no longer slough and acetic acid will be painful for patient.  Wound care of RLE, vashe soaked gauze set to wound bed for 3 minutes, then apply 2 layers of xeroform to wound bed, myrna to periwound, cover with  suprasorb and affix with kerlix.  Compression is ideal however patient states she doesn't tolerate.     Start Methadone TID prn in addition to scheduled Methodone for chronic pain.      Type 2 diabetes mellitus with complication, with long-term current use of insulin (H)  BS fluctuate, continue with Tresiba,     Other chronic pain  Continue with home Methadone 5mg QID.  Monitor.     Behavior concern  Demanding, refusing and disgruntle behavior towards staff likely related to adjustment disorder.  Continue to monitor.  On no antidepressants at this time.       35 total minutes assessing wounds and coordinating the above plan of care with patient and nursing.     Electronically signed by:Doris Junior NP              Sincerely,        Doris Junior, JAN

## 2024-07-24 NOTE — TELEPHONE ENCOUNTER
Did discuss with Charlene.  They will let Vascular provider (who she has a future appt with) know that Surgery Service not following care not writing wound care orders.  and Charlene agrees that Their TCU team did see her today and if pt requires it, they would consider JOSH ELLISON   [facility] RN

## 2024-07-25 ENCOUNTER — TELEPHONE (OUTPATIENT)
Dept: GERIATRICS | Facility: CLINIC | Age: 79
End: 2024-07-25
Payer: COMMERCIAL

## 2024-07-25 ENCOUNTER — LAB REQUISITION (OUTPATIENT)
Dept: LAB | Facility: CLINIC | Age: 79
End: 2024-07-25
Payer: COMMERCIAL

## 2024-07-25 DIAGNOSIS — I50.812 CHRONIC RIGHT HEART FAILURE (H): ICD-10-CM

## 2024-07-25 LAB — INR (EXTERNAL): 5 (ref 0.9–1.1)

## 2024-07-25 NOTE — TELEPHONE ENCOUNTER
Telephone order given to nurse Agustin Alberto RN on 7/25/2024 at 4:57 PM     ----- Message from Doris Junior sent at 7/25/2024  4:42 PM CDT -----  Change wound care to: Daily  Cleanse with vashe and let soaked gauze sit on wound for 5 minutes.    Apply silver sulfadiazine cream 1% to wound bed  Cover with xeroform  Cover with suprasorb and affix with Kerlix.     PICC team to place PICC line  Start Cefepime 2g IV every 12 hours x 7 days.     Check BMP and CBC on 7/30

## 2024-07-25 NOTE — RESULT ENCOUNTER NOTE
Change wound care to: Daily  Cleanse with vashe and let soaked gauze sit on wound for 5 minutes.    Apply silver sulfadiazine cream 1% to wound bed  Cover with xeroform  Cover with suprasorb and affix with Kerlix.     PICC team to place PICC line  Start Cefepime 2g IV every 12 hours x 7 days.     Check BMP and CBC on 7/30

## 2024-07-26 ENCOUNTER — LAB REQUISITION (OUTPATIENT)
Dept: LAB | Facility: CLINIC | Age: 79
End: 2024-07-26
Payer: COMMERCIAL

## 2024-07-26 ENCOUNTER — ANTICOAGULATION THERAPY VISIT (OUTPATIENT)
Dept: ANTICOAGULATION | Facility: CLINIC | Age: 79
End: 2024-07-26

## 2024-07-26 DIAGNOSIS — M79.7 FIBROMYALGIA: ICD-10-CM

## 2024-07-26 DIAGNOSIS — N30.00 ACUTE CYSTITIS WITHOUT HEMATURIA: ICD-10-CM

## 2024-07-26 DIAGNOSIS — L97.919 NON-PRESSURE CHRONIC ULCER OF UNSPECIFIED PART OF RIGHT LOWER LEG WITH UNSPECIFIED SEVERITY (H): ICD-10-CM

## 2024-07-26 DIAGNOSIS — I48.20 CHRONIC ATRIAL FIBRILLATION (H): Primary | Chronic | ICD-10-CM

## 2024-07-26 DIAGNOSIS — G93.41 METABOLIC ENCEPHALOPATHY: ICD-10-CM

## 2024-07-26 DIAGNOSIS — L03.115 CELLULITIS OF RIGHT LOWER LIMB: ICD-10-CM

## 2024-07-26 LAB — INR PPP: 3.7 (ref 0.85–1.15)

## 2024-07-26 PROCEDURE — P9603 ONE-WAY ALLOW PRORATED MILES: HCPCS | Mod: ORL | Performed by: NURSE PRACTITIONER

## 2024-07-26 PROCEDURE — 36415 COLL VENOUS BLD VENIPUNCTURE: CPT | Mod: ORL | Performed by: NURSE PRACTITIONER

## 2024-07-26 PROCEDURE — 85610 PROTHROMBIN TIME: CPT | Mod: ORL | Performed by: NURSE PRACTITIONER

## 2024-07-26 NOTE — PROGRESS NOTES
ANTICOAGULATION MANAGEMENT     Linda Loredo 79 year old female is on warfarin with supratherapeutic INR result. (Goal INR 2.0-3.0)    Recent labs: (last 7 days)     07/26/24  0530   INR 3.70*     Facility checked patient's INR yesterday 7/25 and it was 5, patient refused a venous at the time and held the dose yesterday.  [facility] was not made aware of this result until today.    ASSESSMENT     Source(s): Chart Review and Home Care/Facility Nurse     Warfarin doses taken:  Facility held dose yesterday  Diet: No new diet changes identified  Medication/supplement changes: None noted  New illness, injury, or hospitalization: Yes: patient has a wound on her right calf.  Per chart review patient has been refusing wound cares  Signs or symptoms of bleeding or clotting: No  Previous result: Supratherapeutic  Additional findings: None       PLAN     Recommended plan for no diet, medication or health factor changes affecting INR     Dosing Instructions: hold dose then decrease your warfarin dose (15.4% change) with next INR in 4 days       Summary  As of 7/26/2024      Full warfarin instructions:  7/26: Hold; Otherwise 1.5 mg every Mon, Wed, Fri; 3 mg all other days   Next INR check:  7/30/2024               Telephone call with [facility] nurse (medical care for seniors) who agrees to plan and repeated back plan correctly    Orders given to  Homecare nurse/facility to recheck    Education provided: Please call back if any changes to your diet, medications or how you've been taking warfarin    Plan made with [facility] Pharmacist Capri Mathew, RN  Anticoagulation Clinic  7/26/2024    _______________________________________________________________________     Anticoagulation Episode Summary       Current INR goal:  2.0-3.0   TTR:  --   Target end date:  Indefinite   Send INR reminders to:  [facility] (TCU/LTC/[facility]OMMY)    Indications    Chronic atrial fibrillation  (H) [I48.20]             Comments:               Anticoagulation Care Providers       Provider Role Specialty Phone number    bAby Jacinto, NP Referring Nurse Practitioner [phone]

## 2024-07-28 ENCOUNTER — CARE COORDINATION (OUTPATIENT)
Dept: GERIATRICS | Facility: CLINIC | Age: 79
End: 2024-07-28
Payer: COMMERCIAL

## 2024-07-28 ENCOUNTER — HOSPITAL ENCOUNTER (INPATIENT)
Facility: HOSPITAL | Age: 79
LOS: 10 days | Discharge: NURSING, REHAB OR LONG-TERM CARE | DRG: 603 | End: 2024-08-07
Attending: STUDENT IN AN ORGANIZED HEALTH CARE EDUCATION/TRAINING PROGRAM | Admitting: INTERNAL MEDICINE
Payer: COMMERCIAL

## 2024-07-28 ENCOUNTER — TELEPHONE (OUTPATIENT)
Dept: GERIATRICS | Facility: CLINIC | Age: 79
End: 2024-07-28
Payer: COMMERCIAL

## 2024-07-28 DIAGNOSIS — I48.20 CHRONIC ATRIAL FIBRILLATION (H): Chronic | ICD-10-CM

## 2024-07-28 DIAGNOSIS — T14.8XXA OPEN WOUND OF SKIN: ICD-10-CM

## 2024-07-28 DIAGNOSIS — L97.912 ULCER OF RIGHT LOWER EXTREMITY WITH FAT LAYER EXPOSED (H): Primary | ICD-10-CM

## 2024-07-28 DIAGNOSIS — F11.20 OPIOID DEPENDENCE, UNCOMPLICATED (H): ICD-10-CM

## 2024-07-28 LAB
ALBUMIN SERPL BCG-MCNC: 2.5 G/DL (ref 3.5–5.2)
ALP SERPL-CCNC: 103 U/L (ref 40–150)
ALT SERPL W P-5'-P-CCNC: 17 U/L (ref 0–50)
ANION GAP SERPL CALCULATED.3IONS-SCNC: 9 MMOL/L (ref 7–15)
AST SERPL W P-5'-P-CCNC: 20 U/L (ref 0–45)
BACTERIA WND CULT: ABNORMAL
BASOPHILS # BLD AUTO: 0 10E3/UL (ref 0–0.2)
BASOPHILS NFR BLD AUTO: 0 %
BILIRUB SERPL-MCNC: 0.3 MG/DL
BUN SERPL-MCNC: 30.3 MG/DL (ref 8–23)
CALCIUM SERPL-MCNC: 9.3 MG/DL (ref 8.8–10.4)
CHLORIDE SERPL-SCNC: 93 MMOL/L (ref 98–107)
CREAT SERPL-MCNC: 0.9 MG/DL (ref 0.51–0.95)
CRP SERPL-MCNC: 93.8 MG/L
EGFRCR SERPLBLD CKD-EPI 2021: 65 ML/MIN/1.73M2
EOSINOPHIL # BLD AUTO: 0.3 10E3/UL (ref 0–0.7)
EOSINOPHIL NFR BLD AUTO: 3 %
ERYTHROCYTE [DISTWIDTH] IN BLOOD BY AUTOMATED COUNT: 15.4 % (ref 10–15)
ERYTHROCYTE [SEDIMENTATION RATE] IN BLOOD BY WESTERGREN METHOD: 91 MM/HR (ref 0–30)
GLUCOSE BLDC GLUCOMTR-MCNC: 246 MG/DL (ref 70–99)
GLUCOSE SERPL-MCNC: 323 MG/DL (ref 70–99)
HCO3 SERPL-SCNC: 33 MMOL/L (ref 22–29)
HCT VFR BLD AUTO: 28.8 % (ref 35–47)
HGB BLD-MCNC: 9.5 G/DL (ref 11.7–15.7)
IMM GRANULOCYTES # BLD: 0 10E3/UL
IMM GRANULOCYTES NFR BLD: 0 %
INR PPP: 3.36 (ref 0.85–1.15)
INR PPP: 3.46 (ref 0.85–1.15)
LYMPHOCYTES # BLD AUTO: 1 10E3/UL (ref 0.8–5.3)
LYMPHOCYTES NFR BLD AUTO: 13 %
MCH RBC QN AUTO: 30 PG (ref 26.5–33)
MCHC RBC AUTO-ENTMCNC: 33 G/DL (ref 31.5–36.5)
MCV RBC AUTO: 91 FL (ref 78–100)
MONOCYTES # BLD AUTO: 0.7 10E3/UL (ref 0–1.3)
MONOCYTES NFR BLD AUTO: 8 %
NEUTROPHILS # BLD AUTO: 6.2 10E3/UL (ref 1.6–8.3)
NEUTROPHILS NFR BLD AUTO: 76 %
NRBC # BLD AUTO: 0 10E3/UL
NRBC BLD AUTO-RTO: 0 /100
PLATELET # BLD AUTO: 235 10E3/UL (ref 150–450)
POTASSIUM SERPL-SCNC: 3.7 MMOL/L (ref 3.4–5.3)
PROCALCITONIN SERPL IA-MCNC: 0.11 NG/ML
PROT SERPL-MCNC: 7.4 G/DL (ref 6.4–8.3)
RBC # BLD AUTO: 3.17 10E6/UL (ref 3.8–5.2)
SODIUM SERPL-SCNC: 135 MMOL/L (ref 135–145)
WBC # BLD AUTO: 8.2 10E3/UL (ref 4–11)

## 2024-07-28 PROCEDURE — 999N000157 HC STATISTIC RCP TIME EA 10 MIN

## 2024-07-28 PROCEDURE — 120N000001 HC R&B MED SURG/OB

## 2024-07-28 PROCEDURE — 36415 COLL VENOUS BLD VENIPUNCTURE: CPT | Performed by: STUDENT IN AN ORGANIZED HEALTH CARE EDUCATION/TRAINING PROGRAM

## 2024-07-28 PROCEDURE — 250N000011 HC RX IP 250 OP 636: Performed by: STUDENT IN AN ORGANIZED HEALTH CARE EDUCATION/TRAINING PROGRAM

## 2024-07-28 PROCEDURE — 258N000003 HC RX IP 258 OP 636: Performed by: STUDENT IN AN ORGANIZED HEALTH CARE EDUCATION/TRAINING PROGRAM

## 2024-07-28 PROCEDURE — 85610 PROTHROMBIN TIME: CPT | Performed by: STUDENT IN AN ORGANIZED HEALTH CARE EDUCATION/TRAINING PROGRAM

## 2024-07-28 PROCEDURE — 99223 1ST HOSP IP/OBS HIGH 75: CPT | Mod: AI | Performed by: INTERNAL MEDICINE

## 2024-07-28 PROCEDURE — 250N000013 HC RX MED GY IP 250 OP 250 PS 637: Performed by: INTERNAL MEDICINE

## 2024-07-28 PROCEDURE — 36415 COLL VENOUS BLD VENIPUNCTURE: CPT | Performed by: INTERNAL MEDICINE

## 2024-07-28 PROCEDURE — 96365 THER/PROPH/DIAG IV INF INIT: CPT | Mod: 59

## 2024-07-28 PROCEDURE — 86140 C-REACTIVE PROTEIN: CPT | Performed by: INTERNAL MEDICINE

## 2024-07-28 PROCEDURE — 87040 BLOOD CULTURE FOR BACTERIA: CPT | Performed by: STUDENT IN AN ORGANIZED HEALTH CARE EDUCATION/TRAINING PROGRAM

## 2024-07-28 PROCEDURE — 80053 COMPREHEN METABOLIC PANEL: CPT | Performed by: STUDENT IN AN ORGANIZED HEALTH CARE EDUCATION/TRAINING PROGRAM

## 2024-07-28 PROCEDURE — 85610 PROTHROMBIN TIME: CPT | Performed by: INTERNAL MEDICINE

## 2024-07-28 PROCEDURE — 96366 THER/PROPH/DIAG IV INF ADDON: CPT

## 2024-07-28 PROCEDURE — 84145 PROCALCITONIN (PCT): CPT | Performed by: INTERNAL MEDICINE

## 2024-07-28 PROCEDURE — 99285 EMERGENCY DEPT VISIT HI MDM: CPT | Mod: 25

## 2024-07-28 PROCEDURE — 250N000011 HC RX IP 250 OP 636: Performed by: INTERNAL MEDICINE

## 2024-07-28 PROCEDURE — 85652 RBC SED RATE AUTOMATED: CPT | Performed by: INTERNAL MEDICINE

## 2024-07-28 PROCEDURE — 85025 COMPLETE CBC W/AUTO DIFF WBC: CPT | Performed by: STUDENT IN AN ORGANIZED HEALTH CARE EDUCATION/TRAINING PROGRAM

## 2024-07-28 RX ORDER — HYDROMORPHONE HYDROCHLORIDE 1 MG/ML
0.5 INJECTION, SOLUTION INTRAMUSCULAR; INTRAVENOUS; SUBCUTANEOUS ONCE
Status: COMPLETED | OUTPATIENT
Start: 2024-07-28 | End: 2024-07-28

## 2024-07-28 RX ORDER — LIDOCAINE 40 MG/G
CREAM TOPICAL
Status: DISCONTINUED | OUTPATIENT
Start: 2024-07-28 | End: 2024-08-07 | Stop reason: HOSPADM

## 2024-07-28 RX ORDER — HYDROMORPHONE HYDROCHLORIDE 2 MG/1
2 TABLET ORAL EVERY 4 HOURS PRN
Status: DISCONTINUED | OUTPATIENT
Start: 2024-07-28 | End: 2024-08-07 | Stop reason: HOSPADM

## 2024-07-28 RX ORDER — NICOTINE POLACRILEX 4 MG
15-30 LOZENGE BUCCAL
Status: DISCONTINUED | OUTPATIENT
Start: 2024-07-28 | End: 2024-08-07 | Stop reason: HOSPADM

## 2024-07-28 RX ORDER — AMOXICILLIN 250 MG
1 CAPSULE ORAL 2 TIMES DAILY PRN
Status: DISCONTINUED | OUTPATIENT
Start: 2024-07-28 | End: 2024-08-07 | Stop reason: HOSPADM

## 2024-07-28 RX ORDER — NALOXONE HYDROCHLORIDE 0.4 MG/ML
0.4 INJECTION, SOLUTION INTRAMUSCULAR; INTRAVENOUS; SUBCUTANEOUS
Status: DISCONTINUED | OUTPATIENT
Start: 2024-07-28 | End: 2024-08-07 | Stop reason: HOSPADM

## 2024-07-28 RX ORDER — CEFEPIME HYDROCHLORIDE 2 G/50ML
2 INJECTION, SOLUTION INTRAVENOUS EVERY 12 HOURS
Status: ON HOLD | COMMUNITY
Start: 2024-07-26 | End: 2024-07-31

## 2024-07-28 RX ORDER — POLYETHYLENE GLYCOL 3350 17 G/17G
17 POWDER, FOR SOLUTION ORAL 2 TIMES DAILY PRN
Status: DISCONTINUED | OUTPATIENT
Start: 2024-07-28 | End: 2024-08-07 | Stop reason: HOSPADM

## 2024-07-28 RX ORDER — ONDANSETRON 4 MG/1
4 TABLET, ORALLY DISINTEGRATING ORAL EVERY 6 HOURS PRN
Status: DISCONTINUED | OUTPATIENT
Start: 2024-07-28 | End: 2024-07-28

## 2024-07-28 RX ORDER — ATORVASTATIN CALCIUM 40 MG/1
40 TABLET, FILM COATED ORAL EVERY EVENING
Status: DISCONTINUED | OUTPATIENT
Start: 2024-07-28 | End: 2024-08-07 | Stop reason: HOSPADM

## 2024-07-28 RX ORDER — MEROPENEM 500 MG/1
500 INJECTION, POWDER, FOR SOLUTION INTRAVENOUS EVERY 8 HOURS
Status: DISCONTINUED | OUTPATIENT
Start: 2024-07-29 | End: 2024-08-04

## 2024-07-28 RX ORDER — NUTRITIONAL SUPPLEMENT
1 PACKET (EA) ORAL DAILY
COMMUNITY

## 2024-07-28 RX ORDER — MULTIVITAMIN,THERAPEUTIC
1 TABLET ORAL
Status: DISCONTINUED | OUTPATIENT
Start: 2024-07-29 | End: 2024-08-07 | Stop reason: HOSPADM

## 2024-07-28 RX ORDER — ACETAMINOPHEN 500 MG
1000 TABLET ORAL EVERY 8 HOURS PRN
Status: DISCONTINUED | OUTPATIENT
Start: 2024-07-28 | End: 2024-08-07 | Stop reason: HOSPADM

## 2024-07-28 RX ORDER — ONDANSETRON 2 MG/ML
4 INJECTION INTRAMUSCULAR; INTRAVENOUS EVERY 6 HOURS PRN
Status: DISCONTINUED | OUTPATIENT
Start: 2024-07-28 | End: 2024-08-07 | Stop reason: HOSPADM

## 2024-07-28 RX ORDER — TORSEMIDE 20 MG/1
60 TABLET ORAL DAILY
Status: DISCONTINUED | OUTPATIENT
Start: 2024-07-29 | End: 2024-08-07 | Stop reason: HOSPADM

## 2024-07-28 RX ORDER — METHADONE HYDROCHLORIDE 5 MG/1
5 TABLET ORAL 3 TIMES DAILY PRN
Status: ON HOLD | COMMUNITY
End: 2024-08-07

## 2024-07-28 RX ORDER — METHADONE HYDROCHLORIDE 5 MG/1
5 TABLET ORAL 4 TIMES DAILY
Status: DISCONTINUED | OUTPATIENT
Start: 2024-07-28 | End: 2024-08-07 | Stop reason: HOSPADM

## 2024-07-28 RX ORDER — POTASSIUM CHLORIDE 750 MG/1
20 TABLET, EXTENDED RELEASE ORAL 2 TIMES DAILY
Status: DISCONTINUED | OUTPATIENT
Start: 2024-07-28 | End: 2024-08-07 | Stop reason: HOSPADM

## 2024-07-28 RX ORDER — MORPHINE SULFATE 4 MG/ML
4 INJECTION, SOLUTION INTRAMUSCULAR; INTRAVENOUS ONCE
Status: COMPLETED | OUTPATIENT
Start: 2024-07-28 | End: 2024-07-28

## 2024-07-28 RX ORDER — HYDRALAZINE HYDROCHLORIDE 20 MG/ML
10 INJECTION INTRAMUSCULAR; INTRAVENOUS EVERY 4 HOURS PRN
Status: DISCONTINUED | OUTPATIENT
Start: 2024-07-28 | End: 2024-08-07 | Stop reason: HOSPADM

## 2024-07-28 RX ORDER — HYDRALAZINE HYDROCHLORIDE 10 MG/1
10 TABLET, FILM COATED ORAL EVERY 4 HOURS PRN
Status: DISCONTINUED | OUTPATIENT
Start: 2024-07-28 | End: 2024-08-07 | Stop reason: HOSPADM

## 2024-07-28 RX ORDER — CEFAZOLIN SODIUM 1 G/50ML
2000 SOLUTION INTRAVENOUS ONCE
Status: COMPLETED | OUTPATIENT
Start: 2024-07-28 | End: 2024-07-28

## 2024-07-28 RX ORDER — DEXTROSE MONOHYDRATE 25 G/50ML
25-50 INJECTION, SOLUTION INTRAVENOUS
Status: DISCONTINUED | OUTPATIENT
Start: 2024-07-28 | End: 2024-08-07 | Stop reason: HOSPADM

## 2024-07-28 RX ORDER — CALCIUM CARBONATE 500 MG/1
1000 TABLET, CHEWABLE ORAL 4 TIMES DAILY PRN
Status: DISCONTINUED | OUTPATIENT
Start: 2024-07-28 | End: 2024-08-07 | Stop reason: HOSPADM

## 2024-07-28 RX ORDER — ACETIC ACID 3 %
LIQUID (ML) MISCELLANEOUS DAILY
COMMUNITY

## 2024-07-28 RX ORDER — ONDANSETRON 4 MG/1
4 TABLET, ORALLY DISINTEGRATING ORAL EVERY 6 HOURS PRN
Status: DISCONTINUED | OUTPATIENT
Start: 2024-07-28 | End: 2024-08-07 | Stop reason: HOSPADM

## 2024-07-28 RX ORDER — NALOXONE HYDROCHLORIDE 0.4 MG/ML
0.2 INJECTION, SOLUTION INTRAMUSCULAR; INTRAVENOUS; SUBCUTANEOUS
Status: DISCONTINUED | OUTPATIENT
Start: 2024-07-28 | End: 2024-08-07 | Stop reason: HOSPADM

## 2024-07-28 RX ADMIN — METHADONE HYDROCHLORIDE 5 MG: 5 TABLET ORAL at 20:55

## 2024-07-28 RX ADMIN — HYDROMORPHONE HYDROCHLORIDE 0.5 MG: 1 INJECTION, SOLUTION INTRAMUSCULAR; INTRAVENOUS; SUBCUTANEOUS at 17:43

## 2024-07-28 RX ADMIN — MEROPENEM 500 MG: 500 INJECTION, POWDER, FOR SOLUTION INTRAVENOUS at 23:58

## 2024-07-28 RX ADMIN — MEROPENEM 2 G: 1 INJECTION, POWDER, FOR SOLUTION INTRAVENOUS at 15:30

## 2024-07-28 RX ADMIN — ATORVASTATIN CALCIUM 40 MG: 40 TABLET, FILM COATED ORAL at 20:36

## 2024-07-28 RX ADMIN — POTASSIUM CHLORIDE 20 MEQ: 750 TABLET, EXTENDED RELEASE ORAL at 20:35

## 2024-07-28 RX ADMIN — SODIUM CHLORIDE 2000 MG: 9 INJECTION, SOLUTION INTRAVENOUS at 17:34

## 2024-07-28 ASSESSMENT — ACTIVITIES OF DAILY LIVING (ADL)
ADLS_ACUITY_SCORE: 39
ADLS_ACUITY_SCORE: 45
ADLS_ACUITY_SCORE: 43
ADLS_ACUITY_SCORE: 45
ADLS_ACUITY_SCORE: 43
ADLS_ACUITY_SCORE: 38
ADLS_ACUITY_SCORE: 45
ADLS_ACUITY_SCORE: 45
ADLS_ACUITY_SCORE: 38
ADLS_ACUITY_SCORE: 45

## 2024-07-28 ASSESSMENT — COLUMBIA-SUICIDE SEVERITY RATING SCALE - C-SSRS
6. HAVE YOU EVER DONE ANYTHING, STARTED TO DO ANYTHING, OR PREPARED TO DO ANYTHING TO END YOUR LIFE?: NO
1. IN THE PAST MONTH, HAVE YOU WISHED YOU WERE DEAD OR WISHED YOU COULD GO TO SLEEP AND NOT WAKE UP?: NO
2. HAVE YOU ACTUALLY HAD ANY THOUGHTS OF KILLING YOURSELF IN THE PAST MONTH?: NO

## 2024-07-28 NOTE — PHARMACY-VANCOMYCIN DOSING SERVICE
Pharmacy Vancomycin Initial Note  Date of Service 2024  Patient's  1945  79 year old, female    Indication: Skin and Soft Tissue Infection    Current estimated CrCl = Estimated Creatinine Clearance: 54 mL/min (A) (based on SCr of 1.03 mg/dL (H)).    Creatinine for last 3 days  No results found for requested labs within last 3 days.    Recent Vancomycin Level(s) for last 3 days  No results found for requested labs within last 3 days.      Vancomycin IV Administrations (past 72 hours)        No vancomycin orders with administrations in past 72 hours.                    Nephrotoxins and other renal medications (From now, onward)      None            Contrast Orders - past 72 hours (72h ago, onward)      None                Plan:  Start vancomycin  2000mg (~ 20mg/kg) once  If continuing inpatient, please reorder pharmacy to dose vancomycin consult    Devonte Graves, [facility]

## 2024-07-28 NOTE — PHARMACY-ADMISSION MEDICATION HISTORY
Pharmacist Admission Medication History    Admission medication history is complete. The information provided in this note is only as accurate as the sources available at the time of the update.    Information Source(s): Facility (U/NH/) medication list/MAR via phone and [facility]    Pertinent Information: cefepime - IV abx active at TCU    Changes made to PTA medication list:  Added: cefepime, benzocaine, acetic acid  Deleted: None  Changed: warfarin, methadone    Allergies reviewed with patient and updates made in EHR: unable to assess    Medication History Completed By: Shweta Strong RP 7/28/2024 5:45 PM    PTA Med List   Medication Sig Note Last Dose    acetaminophen (TYLENOL) 500 MG tablet Take 2 tablets (1,000 mg) by mouth every 8 hours as needed for mild pain Pain 1-5/10  Unknown at prn    acetic acid 1 % SOLN Apply topically daily With RLE wound cares  7/28/2024 at am    arginine (ARGINAID) PACK Take 1 packet by mouth daily In OJ  7/28/2024 at am    atorvastatin (LIPITOR) 40 MG tablet Take 1 tablet (40 mg) by mouth every evening  7/27/2024 at pm    Benzocaine (HURRICAINE/TOPEX) 20 % AERO spray daily TOPICAL spray 20% daily with wound care  7/28/2024 at am    ceFEPIme (MAXIPIME) 2-5 GM-%(50ML) infusion Inject 2 g into the vein every 12 hours  7/28/2024 at ?    insulin degludec (TRESIBA) 200 UNIT/ML pen Inject 34 Units subcutaneously every morning  7/28/2024 at am    melatonin 1 MG TABS tablet Take 1 tablet (1 mg) by mouth nightly as needed for sleep  Unknown at prn    Menthol, Topical Analgesic, (BIOFREEZE EX) Apply 1 Application topically 3 times daily as needed Apply to neck and shoulders  Unknown at prn    methadone (DOLOPHINE) 5 MG tablet Take 5 mg by mouth 3 times daily as needed for severe pain  Unknown at prn, also scheduled doses    methadone (DOLOPHINE) 5 MG tablet TAKE 1 TABLET BY MOUTH FOUR TIMES DAILY 7/28/2024: 0700, 1100, 1500, 2000 7/28/2024 at 1130    miconazole (MICATIN) 2  % external powder Apply topically 2 times daily  7/28/2024 at am    mineral oil-hydrophilic petrolatum (AQUAPHOR) external ointment Apply to left thigh wound BID (Patient taking differently: 2 times daily Apply to left thigh wound BID)  7/28/2024 at am    Multiple Vitamin (TAB-A-YUN) TABS Take 1 tablet by mouth daily (with lunch)  7/27 or 7/28/2024 at 1200    ondansetron (ZOFRAN ODT) 4 MG ODT tab Take 1 tablet (4 mg) by mouth every 6 hours as needed for nausea or vomiting  Unknown at prn    polyethylene glycol (MIRALAX) 17 GM/Dose powder Take 17 g by mouth 2 times daily as needed for constipation  Unknown at prn    potassium chloride ER (K-TAB) 20 MEQ CR tablet Take 20 mEq by mouth 2 times daily  7/28/2024 at am    senna-docusate (SENOKOT-S/PERICOLACE) 8.6-50 MG tablet Take 1 tablet by mouth 2 times daily as needed for constipation  Unknown at prn    torsemide (DEMADEX) 20 MG tablet Take 60 mg by mouth daily  7/28/2024 at am    WARFARIN SODIUM PO Take by mouth daily Dosing in coordination with INR results  Full warfarin instructions: 7/26/24: Hold; Otherwise 1.5 mg every Mon, Wed, Fri; 3 mg all other days  Next INR check: 7/30/2024 7/28/2024: Full warfarin instructions: 7/26: Hold; Otherwise 1.5 mg every Mon, Wed, Fri; 3 mg all other days   - this a recently decreased dose, from notes appears 1.5 mg went from once weekly to three times weekly   7/27/2024 at 3 mg    Zinc oxide 10 % CREA Externally apply topically 3 times daily Apply to coccyx area once per shift after brief changes  7/28/2024 at am

## 2024-07-28 NOTE — TELEPHONE ENCOUNTER
7/28/2024  12:30pm      Patient's wound has now cultured pseudomonas, ESBL and MRSA.  She was started on Cefepime on 7/25 for pseudomonas in her wound.      Called [facility] TCU to instruct nursing to send patient to ER and admission as she would need more antibiotic coverage and closer assessment due to ongoing greenish drainage.  Nursing reports she continues to refuse dressing changes intermittently and to change positions or get out of bed.      Nursing states they will send her to [facility].      Electronically signed: Doris Junior NP on 7/28/2024 at 12:48 PM

## 2024-07-28 NOTE — ED NOTES
[facility] ED Handoff Report    ED Chief Complaint: wound infection    ED Diagnosis:  (T14.8XXA) Open wound of skin  Comment:   Plan:        PMH:    Past Medical History:   Diagnosis Date    Abscess of left foot 10/31/2022    Adverse effect of anticoagulants, initial encounter 2024    GUSTAVO (acute kidney injury) (H24) 2023    Anxiety 10/20/2011    Cellulitis - bilateral lower extremities 2023    Cellulitis of buttock 2023    Decubitus ulcers - 5 present on buttocks/perineal region, numerous scabbed over and unstagable on shin and bilateral feet with some bloody blisters noted on feet 2023    Depressive disorder, not elsewhere classified     Diabetic foot ulcer (H) 2023    Elevated liver enzymes 10/20/2011    Fracture of fibula, distal, left, closed 10/20/2011    ORIF 10/13/11      Herpes zoster 2005: On gabapentin and lidoderm. She has been switched from gabapentin to lyrica.       Herpes zoster without mention of complication     Hypercalcemia 2007    Hypoglycemia 2023    Hypotension 10/27/2011    Major depressive disorder, recurrent episode, moderate (H) 2008    Mild intermittent asthma     Mixed hyperlipidemia due to type 2 diabetes mellitus (H) 2008    Formatting of this note is different from the original.     22 ASCVD 10 year risk: 37.9%      Last Lipids:  Last Lipids:  Chol: 2021 130   63  HDL: 2021 46  Non-HDL: 2021 84  Chol/HDL Ratio: 2021 2.83  LDL DIRECT: . No results found in past 5 years   LDL CHOLESTEROL (mg/dL)   Date Value   2021 71      22   The 10-year ASCVD risk score (Teddy SMITH Jr.    Non-healing wound of left heel 2023    Osteomyelitis (H) 10/24/2023    Other urinary incontinence     Sepsis without acute organ dysfunction, due to unspecified organism (H) 2023    Skin ulcer of right lower leg with fat layer exposed (H) 2024     Supratherapeutic INR 05/27/2023    Type II or unspecified type diabetes mellitus with renal manifestations, uncontrolled(250.42) (H)     Type II or unspecified type diabetes mellitus without mention of complication, not stated as uncontrolled     Unspecified essential hypertension     Unspecified open wound, left foot, subsequent encounter 09/22/2023    UTI (urinary tract infection) - possible 05/26/2023    Warfarin-induced coagulopathy (H24) 05/27/2023        Code Status:  Prior     Falls Risk: Yes Band: Applied    Current Living Situation/Residence: lives in a skilled nursing facility     Elimination Status: Continent: purewick     Activity Level: 2 assist    Patients Preferred Language:  English     Needed: No    Vital Signs:  /58   Pulse 98   Temp 100.3  F (37.9  C) (Oral)   Resp 16   Wt 104.3 kg (230 lb)   SpO2 94%   BMI 37.12 kg/m       Cardiac Rhythm:     Pain Score: 8/10    Is the Patient Confused:  No - intermittent     Last Food or Drink: 07/28/24     Focused Assessment:  large weeping wound on R leg. Also has some on coccyx. Non compliant with cares at times, mentation has declined over the last few days.     Tests Performed: Done: Labs and Imaging    Treatments Provided:  IV abx    Family Dynamics/Concerns: No    Family Updated On Visitor Policy: Yes    Plan of Care Communicated to Family: Yes    Who Was Updated about Plan of Care: daughter    Belongings Checklist Done and Signed by Patient: Yes    Medications sent with patient:     Covid: asymptomatic ,     Additional Information: needs MRI still     RN: Maxime Casey RN   7/28/2024 5:59 PM

## 2024-07-28 NOTE — ED NOTES
Bed: [facility]NED[facility]-I  Expected date: 7/28/24  Expected time: 1:56 PM  Means of arrival: Ambulance  Comments:  WBL - leg cellulitis

## 2024-07-28 NOTE — ED NOTES
Spoke to JOSE Valdez, from [facility] where patient resides. Per Courtney, wound culture grew 3 new organisms that they do not have antibiotics to treat with. Patient has other wounds as well, including on coccyx. Patient can be noncompliant with cares and other treatments, refusing dressing changes, refusing sreekanth care after incontinence, and has been known to throw feces. Courtney reports that patient's cognition has slightly declined in recent days, perhaps due to infection.     Patient takes methadone 5 mg scheduled and PRN. Next dose would be at 1500 today. Last dose was administered at 1130 today.     Courtney can be reached at [phone]

## 2024-07-28 NOTE — ED PROVIDER NOTES
EMERGENCY DEPARTMENT ENCOUNTER      NAME: Linda Loredo  AGE: 79 year old female  YOB: 1945  MRN: [id number]  EVALUATION DATE & TIME: 2024  2:04 PM    PCP: Louann Peraza Physician    ED PROVIDER: Varghese Reagan M.D.      Chief Complaint   Patient presents with    Leg Pain         FINAL IMPRESSION:  1. Open wound of skin          ED COURSE & MEDICAL DECISION MAKIN:44 PM I met with the patient, obtained history, performed an initial exam, and discussed options and plan for diagnostics and treatment here in the ED.  5:07 PM PV was paged.    Pertinent Labs & Imaging studies reviewed. (See chart for details)  79 year old female presents to the Emergency Department for evaluation of leg wound.  Patient was being treated in a TCU currently.  Swab there grew out polymicrobial danny including ESBL and MRSA.  Wound looks worse.  Patient is not particularly ill-appearing.  Vital signs are stable.  Blood work is reassuring with no elevated lactic acid.  Considered sepsis.  MRI ordered for the leg to rule out osteomyelitis although this was done couple weeks ago and was normal.  Because of the continued difficulty in healing we will use meropenem and vancomycin and admit to the hospital for further care.    At the conclusion of the encounter I discussed the results of all of the tests and the disposition. The questions were answered. The patient or family acknowledged understanding and was agreeable with the care plan.              Medical Decision Making  Obtained supplemental history:Supplemental history obtained?: Documented in chart and EMS  Reviewed external records: External records reviewed?: Documented in chart and Outpatient Record: Wound aerobic bacterial culture grew on 2024  Care impacted by chronic illness:Other: TIIDM, HTN, HLD, obesity, diabetic polyneuropathy, chronic atrial fibrillation, fibromyalgia, CKDIII, osteomyelitis, S/P below-knee amputation of LLE, OA,  and chronic right-sided HF  Care significantly affected by social determinants of health:Access to Affordable Health Care  Did you consider but not order tests?: Work up considered but not performed and documented in chart, if applicable  Did you interpret images independently?: Independent interpretation of ECG and images noted in documentation, when applicable.  Consultation discussion with other provider:Did you involve another provider (consultant, , pharmacy, etc.)?: I discussed the care with another health care provider, see documentation for details.  Admit.    This patient involved a high degree of complexity in medical decision making, as significant risks were present and assessed. Recent encounters & results in medical record reviewed by me.     All workup (i.e. any EKG/labs/imaging as per charting below) reviewed and independently interpreted by me. See respective sections for details.       minutes of critical care time     MEDICATIONS GIVEN IN THE EMERGENCY:  Medications   acetaminophen (TYLENOL) tablet 1,000 mg (1,000 mg Oral $Given 7/29/24 0055)   atorvastatin (LIPITOR) tablet 40 mg (40 mg Oral $Given 7/29/24 2119)   benzocaine 20% (HURRICAINE/TOPEX) 20 % spray 0.5-1 mL ( Mouth/Throat Not Given 7/30/24 0814)   insulin degludec (TRESIBA) 100 UNIT/ML injection 34 Units (34 Units Subcutaneous Not Given 7/30/24 0805)   methadone (DOLOPHINE) tablet 5 mg (5 mg Oral $Given 7/30/24 1235)   miconazole (MICATIN) 2 % powder (1 applicator Topical $Given 7/30/24 0814)   multivitamin, therapeutic (THERA-VIT) tablet 1 tablet (1 tablet Oral $Given 7/30/24 1234)   polyethylene glycol (MIRALAX) Packet 17 g (has no administration in time range)   potassium chloride aldair ER (KLOR-CON M10) CR tablet 20 mEq (20 mEq Oral $Given 7/30/24 0812)   senna-docusate (SENOKOT-S/PERICOLACE) 8.6-50 MG per tablet 1 tablet (has no administration in time range)   torsemide (DEMADEX) tablet 60 mg (60 mg Oral $Given 7/30/24 0811)    Warfarin Dose Required Daily - Pharmacist Managed (has no administration in time range)   lidocaine 1 % 0.1-1 mL (has no administration in time range)   lidocaine (LMX4) cream (has no administration in time range)   sodium chloride (PF) 0.9% PF flush 3 mL (3 mLs Intracatheter $Given 7/30/24 0446)   sodium chloride (PF) 0.9% PF flush 3 mL (3 mLs Intracatheter $Given 7/30/24 0100)   calcium carbonate (TUMS) chewable tablet 1,000 mg (has no administration in time range)   glucose gel 15-30 g (has no administration in time range)     Or   dextrose 50 % injection 25-50 mL (has no administration in time range)     Or   glucagon injection 1 mg (has no administration in time range)   Patient is already receiving anticoagulation with heparin, enoxaparin (LOVENOX), warfarin (COUMADIN)  or other anticoagulant medication (has no administration in time range)   hydrALAZINE (APRESOLINE) tablet 10 mg (has no administration in time range)     Or   hydrALAZINE (APRESOLINE) injection 10 mg (has no administration in time range)   HYDROmorphone (DILAUDID) tablet 2 mg (2 mg Oral Not Given 7/30/24 1035)   ondansetron (ZOFRAN ODT) ODT tab 4 mg (has no administration in time range)     Or   ondansetron (ZOFRAN) injection 4 mg (has no administration in time range)   insulin aspart (NovoLOG) injection (RAPID ACTING) (1 Units Subcutaneous $Given 7/30/24 1233)   insulin aspart (NovoLOG) injection (RAPID ACTING) (1 Units Subcutaneous $Given 7/29/24 2126)   insulin aspart (NovoLOG) injection (RAPID ACTING) (7 Units Subcutaneous $Given 7/30/24 1234)   meropenem (MERREM) 500 mg vial to attach to  mL bag for ADULTS or 25 mL bag for PEDS (500 mg Intravenous $New Bag 7/30/24 0806)   naloxone (NARCAN) injection 0.2 mg (has no administration in time range)     Or   naloxone (NARCAN) injection 0.4 mg (has no administration in time range)     Or   naloxone (NARCAN) injection 0.2 mg (has no administration in time range)     Or   naloxone (NARCAN)  injection 0.4 mg (has no administration in time range)   warfarin-No DOSE today (has no administration in time range)   vancomycin (VANCOCIN) 1,500 mg in sodium chloride 0.9 % 250 mL intermittent infusion (1,500 mg Intravenous $New Bag 7/29/24 1752)   LORazepam (ATIVAN) injection 0.5 mg (has no administration in time range)   warfarin ANTICOAGULANT (COUMADIN) half-tab 3.5 mg (has no administration in time range)   meropenem (MERREM) 2 g in sodium chloride 0.9 % 100 mL intermittent infusion (0 g Intravenous Stopped 7/28/24 1734)   vancomycin (VANCOCIN) 2,000 mg in sodium chloride 0.9 % 500 mL intermittent infusion (2,000 mg Intravenous $New Bag 7/28/24 1734)   morphine (PF) injection 4 mg (4 mg Intravenous Not Given 7/28/24 1743)   HYDROmorphone (PF) (DILAUDID) injection 0.5 mg (0.5 mg Intravenous $Given 7/28/24 1743)   warfarin ANTICOAGULANT (COUMADIN) tablet 1 mg (1 mg Oral $Given 7/29/24 1808)       NEW PRESCRIPTIONS STARTED AT TODAY'S ER VISIT  Current Discharge Medication List             =================================================================    HPI    Patient information was obtained from: Patient, EMS, and [facility] RN    Use of : N/A        Linda DELGADILLO Awa Loredo is a 79 year old female with a pertinent history of TIIDM, HTN, HLD, obesity, diabetic polyneuropathy, chronic atrial fibrillation, fibromyalgia, CKDIII, osteomyelitis, S/P below-knee amputation of LLE, OA, chronic right-sided HF, cellulitis of RLE, sepsis, and ulcer of RLL, who presents for evaluation of right lag pain and wound check.    Per chart review: Patient had wound aerobic bacterial culture grew on 7/24/2024 at 1150. The culture grew pseudomonas aeruginosa (1), pseudomonas aeruginosa (2), escherichia coli ESBL, etaphylococcus aureus MRSA, and enterococcus faecalis. Pseudomonas aeruginosa (2) is resistant to ceftazidime and piperacillin/tazobactam.    Per RN, she spoke with the nurse from where patient resides  "([facility]). Rn notes they grew three new organisms from culture and report they don't have antibiotics to treat these with. Patient has another wounds, one on her coccyx. Per [facility] R, patient can be noncompliant with cares and other treatments but refuses dressing changes and sreekanth care after incontinence. RN noted patient's cognition has slightly declined in recent days that may be related to infection. Patient rakes 5 mg of methadone and last received this at 1130 today, per facility nurse.    Patient reports she was diagnosed with an infection to her RLL and this infection is currently being treated with IV antibiotics. Infection is wrapped and bandaged. She currently lives at [facility]. Per patient, a culture was done of her RLL wound at her facility and they got a call back today from a CNP stating that there was 3 new \"infections\" found on culture. She currently takes 5 mg of methadone and has been taking this for quite some time. She denies high fevers. Mentions a hx of sepsis. She has been given all her medications through the IV. She endorses constant RLL pain. No other reported complaints or concerns at this time.      REVIEW OF SYSTEMS   Review of Systems - Refer to HPI, otherwise negative    PAST MEDICAL HISTORY:  Past Medical History:   Diagnosis Date    Abscess of left foot 10/31/2022    Adverse effect of anticoagulants, initial encounter 04/16/2024    GUSTAVO (acute kidney injury) (H24) 05/26/2023    Anxiety 10/20/2011    Cellulitis - bilateral lower extremities 05/27/2023    Cellulitis of buttock 05/26/2023    Decubitus ulcers - 5 present on buttocks/perineal region, numerous scabbed over and unstagable on shin and bilateral feet with some bloody blisters noted on feet 05/27/2023    Depressive disorder, not elsewhere classified     Diabetic foot ulcer (H) 09/29/2023    Elevated liver enzymes 10/20/2011    Fracture of fibula, distal, left, closed 10/20/2011    ORIF 10/13/11      " Herpes zoster 2005: On gabapentin and lidoderm. She has been switched from gabapentin to lyrica.       Herpes zoster without mention of complication     Hypercalcemia 2007    Hypoglycemia 2023    Hypotension 10/27/2011    Major depressive disorder, recurrent episode, moderate (H) 2008    Mild intermittent asthma     Mixed hyperlipidemia due to type 2 diabetes mellitus (H) 2008    Formatting of this note is different from the original.     22 ASCVD 10 year risk: 37.9%      Last Lipids:  Last Lipids:  Chol: 2021 130   63  HDL: 2021 46  Non-HDL: 2021 84  Chol/HDL Ratio: 2021 2.83  LDL DIRECT: . No results found in past 5 years   LDL CHOLESTEROL (mg/dL)   Date Value   2021 71      22   The 10-year ASCVD risk score (Lyme LUIS Jr.    Non-healing wound of left heel 2023    Osteomyelitis (H) 10/24/2023    Other urinary incontinence     Sepsis without acute organ dysfunction, due to unspecified organism (H) 2023    Skin ulcer of right lower leg with fat layer exposed (H) 2024    Supratherapeutic INR 2023    Type II or unspecified type diabetes mellitus with renal manifestations, uncontrolled(250.42) (H)     Type II or unspecified type diabetes mellitus without mention of complication, not stated as uncontrolled     Unspecified essential hypertension     Unspecified open wound, left foot, subsequent encounter 2023    UTI (urinary tract infection) - possible 2023    Warfarin-induced coagulopathy (H24) 2023       PAST SURGICAL HISTORY:  Past Surgical History:   Procedure Laterality Date    AMPUTATE LEG BELOW KNEE Left 10/7/2023    Procedure: LEFT GUILLOTINE BELOW KNEE AMPUTATION.;  Surgeon: Lan Otto MD;  Location:  OR    AMPUTATE LEG BELOW KNEE Left 10/14/2023    Procedure: debridement of left below the knee amputation;  Surgeon: Lan Otto MD;  Location:  OR     APPLY WOUND VAC Left 1/2/2024    Procedure: WOUND VAC APPLICATION TO LEFT BELOW KNEE STUMP;  Surgeon: Lan Otto MD;  Location: [facility] OR    CHOLECYSTECTOMY      GRAFT SKIN SPLIT THICKNESS FROM TRUNK TO HEAD Left 1/2/2024    Procedure: APPLICATION OF SPLIT-THICKNESS SKIN GRAFT TO LEFT BELOW KNEE STUMP, GRAFT FROM LEFT THIGH;  Surgeon: Lan Otto MD;  Location: [facility] OR    INCISION AND DRAINAGE FOOT, COMBINED Left 9/26/2023    Procedure: Surgical debridement of all nonviable skin and soft tissue and bone left foot;  Surgeon: Nolberto Thorne DPM;  Location: [facility] OR    IR LOWER EXTREMITY ANGIOGRAM LEFT  10/3/2023    IRRIGATION AND DEBRIDEMENT LOWER EXTREMITY, COMBINED Right 7/14/2024    Procedure: IRRIGATION AND DEBRIDEMENT, LOWER EXTREMITY, RIGHT LOWER LEG;  Surgeon: Phuong Gutierrez MD;  Location: [facility] OR    ligation of fallopian tube      OPEN REDUCTION INTERNAL FIXATION ANKLE  10/13/11    left    pinning of left 2nd toe             CURRENT MEDICATIONS:    No current outpatient medications on file.      ALLERGIES:  Allergies   Allergen Reactions    Prednisone Nausea and Vomiting    Morphine Nausea and Vomiting    Morphine [Fumaric Acid] Nausea and Vomiting    Nitrofurantoin Unknown     Per nursing home       FAMILY HISTORY:  Family History   Problem Relation Age of Onset    Hypertension Mother     Heart Disease Father         heart attack and cardiomegaly    Diabetes Sister         type 2    Hypertension Sister     Respiratory Sister     Psychotic Disorder Sister         bipolar    Cancer Maternal Grandmother         throat    Cancer Paternal Grandmother         gastric       SOCIAL HISTORY:   Social History     Socioeconomic History    Marital status:    Tobacco Use    Smoking status: Never    Smokeless tobacco: Never   Substance and Sexual Activity    Alcohol use: No    Drug use: No    Sexual activity: Never     Social Determinants of Health     Financial Resource Strain: Medium Risk  (3/7/2022)    Received from [facility], [facility]    Financial Resource Strain     Difficulty of Paying Living Expenses: 1     Difficulty of Paying Living Expenses: 2   Food Insecurity: No Food Insecurity (3/7/2022)    Received from [facility], [facility]    Food Insecurity     Worried About Running Out of Food in the Last Year: 1   Transportation Needs: No Transportation Needs (3/7/2022)    Received from [facility], [facility]    Transportation Needs     Lack of Transportation (Medical): 1    Received from [facility], [facility]    Social Connections   Housing Stability: Low Risk  (3/7/2022)    Received from [facility], [facility]    Housing Stability     Unable to Pay for Housing in the Last Year: 1       VITALS:  /53 (BP Location: Left arm)   Pulse 79   Temp 98.3  F (36.8  C) (Oral)   Resp 18   Wt 104.3 kg (230 lb)   SpO2 93%   BMI 37.12 kg/m        PHYSICAL EXAM    Constitutional: Well developed, Well nourished, NAD, GCS 15  HENT: Normocephalic, Atraumatic, Bilateral external ears normal, Oropharynx normal, mucous membranes moist, Nose normal. Neck-  Normal range of motion, No tenderness, Supple, No stridor.  Eyes: PERRL, EOMI, Conjunctiva normal, No discharge.   Respiratory: Normal breath sounds, No respiratory distress, No wheezing, Speaks full sentences easily. No cough.  Cardiovascular: Normal heart rate, Regular rhythm, No murmurs, No rubs, No gallops. Chest wall nontender.  GI:Soft, No tenderness, No masses, No flank tenderness. No rebound or guarding.    Musculoskeletal: 2+ DP pulses. No edema.No cyanosis, No clubbing. Good range of motion in all major  joints. No tenderness to palpation or major deformities noted.   Integument:large ulcer to posterior leg with purulent drainage.  Surrounding erythema and odor noted.  Neurologic: Alert & oriented x 3,  CN 3-12 intact Normal motor function, Normal sensory function, No focal deficits noted. Normal gait. Normal finger to nose bilaterally  Psychiatric: Affect normal, Judgment normal, Mood normal. Cooperative.          LAB:  All pertinent labs reviewed and interpreted.  Labs Ordered and Resulted from Time of ED Arrival to Time of ED Departure   COMPREHENSIVE METABOLIC PANEL - Abnormal       Result Value    Sodium 135      Potassium 3.7      Carbon Dioxide (CO2) 33 (*)     Anion Gap 9      Urea Nitrogen 30.3 (*)     Creatinine 0.90      GFR Estimate 65      Calcium 9.3      Chloride 93 (*)     Glucose 323 (*)     Alkaline Phosphatase 103      AST 20      ALT 17      Protein Total 7.4      Albumin 2.5 (*)     Bilirubin Total 0.3     INR - Abnormal    INR 3.46 (*)    CBC WITH PLATELETS AND DIFFERENTIAL - Abnormal    WBC Count 8.2      RBC Count 3.17 (*)     Hemoglobin 9.5 (*)     Hematocrit 28.8 (*)     MCV 91      MCH 30.0      MCHC 33.0      RDW 15.4 (*)     Platelet Count 235      % Neutrophils 76      % Lymphocytes 13      % Monocytes 8      % Eosinophils 3      % Basophils 0      % Immature Granulocytes 0      NRBCs per 100 WBC 0      Absolute Neutrophils 6.2      Absolute Lymphocytes 1.0      Absolute Monocytes 0.7      Absolute Eosinophils 0.3      Absolute Basophils 0.0      Absolute Immature Granulocytes 0.0      Absolute NRBCs 0.0         RADIOLOGY:  Reviewed all pertinent imaging. Please see official radiology report.  MR Tibia Fibula Lower Leg Right w Contrast    (Results Pending)       EKG:    N/A    PROCEDURES:   N/A      I, Whit Elizabeth, am serving as a scribe to document services personally performed by Dr. Varghese Reagan based on my observation and the provider's statements to me. Varghese CARDENAS MD  attest that Whit Elizabeth is acting in a scribe capacity, has observed my performance of the services and has documented them in accordance with my direction.    Varghese Reagan M.D.  Emergency Medicine  [facility] EMERGENCY DEPARTMENT  [address] [place] [id number]  [phone]  Dept: [phone]       Varghese Reagan MD  07/30/24 0480

## 2024-07-29 LAB
ALBUMIN SERPL BCG-MCNC: 2.5 G/DL (ref 3.5–5.2)
ALP SERPL-CCNC: 94 U/L (ref 40–150)
ALT SERPL W P-5'-P-CCNC: 16 U/L (ref 0–50)
ANION GAP SERPL CALCULATED.3IONS-SCNC: 7 MMOL/L (ref 7–15)
AST SERPL W P-5'-P-CCNC: 16 U/L (ref 0–45)
BILIRUB SERPL-MCNC: 0.3 MG/DL
BUN SERPL-MCNC: 24.2 MG/DL (ref 8–23)
CALCIUM SERPL-MCNC: 9.2 MG/DL (ref 8.8–10.4)
CHLORIDE SERPL-SCNC: 98 MMOL/L (ref 98–107)
CREAT SERPL-MCNC: 0.82 MG/DL (ref 0.51–0.95)
EGFRCR SERPLBLD CKD-EPI 2021: 72 ML/MIN/1.73M2
ERYTHROCYTE [DISTWIDTH] IN BLOOD BY AUTOMATED COUNT: 15.6 % (ref 10–15)
GLUCOSE BLDC GLUCOMTR-MCNC: 105 MG/DL (ref 70–99)
GLUCOSE BLDC GLUCOMTR-MCNC: 114 MG/DL (ref 70–99)
GLUCOSE BLDC GLUCOMTR-MCNC: 157 MG/DL (ref 70–99)
GLUCOSE BLDC GLUCOMTR-MCNC: 215 MG/DL (ref 70–99)
GLUCOSE SERPL-MCNC: 113 MG/DL (ref 70–99)
HCO3 SERPL-SCNC: 34 MMOL/L (ref 22–29)
HCT VFR BLD AUTO: 28.8 % (ref 35–47)
HGB BLD-MCNC: 9.1 G/DL (ref 11.7–15.7)
INR PPP: 2.99 (ref 0.85–1.15)
MCH RBC QN AUTO: 29.1 PG (ref 26.5–33)
MCHC RBC AUTO-ENTMCNC: 31.6 G/DL (ref 31.5–36.5)
MCV RBC AUTO: 92 FL (ref 78–100)
PLATELET # BLD AUTO: 217 10E3/UL (ref 150–450)
POTASSIUM SERPL-SCNC: 3.7 MMOL/L (ref 3.4–5.3)
PROT SERPL-MCNC: 7 G/DL (ref 6.4–8.3)
RBC # BLD AUTO: 3.13 10E6/UL (ref 3.8–5.2)
SODIUM SERPL-SCNC: 139 MMOL/L (ref 135–145)
WBC # BLD AUTO: 6.9 10E3/UL (ref 4–11)

## 2024-07-29 PROCEDURE — 85027 COMPLETE CBC AUTOMATED: CPT | Performed by: INTERNAL MEDICINE

## 2024-07-29 PROCEDURE — 250N000011 HC RX IP 250 OP 636: Performed by: INTERNAL MEDICINE

## 2024-07-29 PROCEDURE — 99222 1ST HOSP IP/OBS MODERATE 55: CPT | Performed by: INTERNAL MEDICINE

## 2024-07-29 PROCEDURE — 250N000012 HC RX MED GY IP 250 OP 636 PS 637: Performed by: INTERNAL MEDICINE

## 2024-07-29 PROCEDURE — 36415 COLL VENOUS BLD VENIPUNCTURE: CPT | Performed by: INTERNAL MEDICINE

## 2024-07-29 PROCEDURE — 120N000001 HC R&B MED SURG/OB

## 2024-07-29 PROCEDURE — 99233 SBSQ HOSP IP/OBS HIGH 50: CPT | Performed by: HOSPITALIST

## 2024-07-29 PROCEDURE — 85610 PROTHROMBIN TIME: CPT | Performed by: INTERNAL MEDICINE

## 2024-07-29 PROCEDURE — 80053 COMPREHEN METABOLIC PANEL: CPT | Performed by: INTERNAL MEDICINE

## 2024-07-29 PROCEDURE — 250N000013 HC RX MED GY IP 250 OP 250 PS 637: Performed by: INTERNAL MEDICINE

## 2024-07-29 PROCEDURE — 258N000003 HC RX IP 258 OP 636: Performed by: INTERNAL MEDICINE

## 2024-07-29 PROCEDURE — 250N000013 HC RX MED GY IP 250 OP 250 PS 637: Performed by: HOSPITALIST

## 2024-07-29 PROCEDURE — G0463 HOSPITAL OUTPT CLINIC VISIT: HCPCS

## 2024-07-29 RX ORDER — WARFARIN SODIUM 1 MG/1
1 TABLET ORAL
Status: COMPLETED | OUTPATIENT
Start: 2024-07-29 | End: 2024-07-29

## 2024-07-29 RX ADMIN — POTASSIUM CHLORIDE 20 MEQ: 750 TABLET, EXTENDED RELEASE ORAL at 09:24

## 2024-07-29 RX ADMIN — WARFARIN SODIUM 1 MG: 1 TABLET ORAL at 18:08

## 2024-07-29 RX ADMIN — POTASSIUM CHLORIDE 20 MEQ: 750 TABLET, EXTENDED RELEASE ORAL at 21:19

## 2024-07-29 RX ADMIN — INSULIN ASPART 6 UNITS: 100 INJECTION, SOLUTION INTRAVENOUS; SUBCUTANEOUS at 11:51

## 2024-07-29 RX ADMIN — HYDROMORPHONE HYDROCHLORIDE 2 MG: 2 TABLET ORAL at 10:40

## 2024-07-29 RX ADMIN — INSULIN ASPART 4 UNITS: 100 INJECTION, SOLUTION INTRAVENOUS; SUBCUTANEOUS at 09:09

## 2024-07-29 RX ADMIN — TORSEMIDE 60 MG: 20 TABLET ORAL at 09:31

## 2024-07-29 RX ADMIN — MEROPENEM 500 MG: 500 INJECTION, POWDER, FOR SOLUTION INTRAVENOUS at 16:37

## 2024-07-29 RX ADMIN — METHADONE HYDROCHLORIDE 5 MG: 5 TABLET ORAL at 21:19

## 2024-07-29 RX ADMIN — THERA TABS 1 TABLET: TAB at 13:21

## 2024-07-29 RX ADMIN — MICONAZOLE NITRATE ANTIFUNGAL POWDER: 2 POWDER TOPICAL at 09:32

## 2024-07-29 RX ADMIN — METHADONE HYDROCHLORIDE 5 MG: 5 TABLET ORAL at 16:36

## 2024-07-29 RX ADMIN — ATORVASTATIN CALCIUM 40 MG: 40 TABLET, FILM COATED ORAL at 21:19

## 2024-07-29 RX ADMIN — METHADONE HYDROCHLORIDE 5 MG: 5 TABLET ORAL at 13:21

## 2024-07-29 RX ADMIN — METHADONE HYDROCHLORIDE 5 MG: 5 TABLET ORAL at 09:24

## 2024-07-29 RX ADMIN — MEROPENEM 500 MG: 500 INJECTION, POWDER, FOR SOLUTION INTRAVENOUS at 09:23

## 2024-07-29 RX ADMIN — SODIUM CHLORIDE 1500 MG: 9 INJECTION, SOLUTION INTRAVENOUS at 17:52

## 2024-07-29 RX ADMIN — INSULIN ASPART 6 UNITS: 100 INJECTION, SOLUTION INTRAVENOUS; SUBCUTANEOUS at 19:05

## 2024-07-29 RX ADMIN — INSULIN DEGLUDEC 34 UNITS: 100 INJECTION, SOLUTION SUBCUTANEOUS at 09:21

## 2024-07-29 RX ADMIN — ACETAMINOPHEN 1000 MG: 500 TABLET ORAL at 00:55

## 2024-07-29 RX ADMIN — MICONAZOLE NITRATE ANTIFUNGAL POWDER: 2 POWDER TOPICAL at 21:21

## 2024-07-29 ASSESSMENT — ACTIVITIES OF DAILY LIVING (ADL)
ADLS_ACUITY_SCORE: 35
ADLS_ACUITY_SCORE: 45
ADLS_ACUITY_SCORE: 45
ADLS_ACUITY_SCORE: 35
ADLS_ACUITY_SCORE: 39
ADLS_ACUITY_SCORE: 35
ADLS_ACUITY_SCORE: 35
ADLS_ACUITY_SCORE: 45
ADLS_ACUITY_SCORE: 39
ADLS_ACUITY_SCORE: 35
ADLS_ACUITY_SCORE: 45
ADLS_ACUITY_SCORE: 45
ADLS_ACUITY_SCORE: 35
ADLS_ACUITY_SCORE: 45
ADLS_ACUITY_SCORE: 35

## 2024-07-29 NOTE — PHARMACY-ANTICOAGULATION SERVICE
Clinical Pharmacy - Warfarin Dosing Consult     Pharmacy has been consulted to manage this patient s warfarin therapy.  Indication: Atrial Fibrillation  Therapy Goal: INR 2-3  Provider/Team: RHINA MART Antico Clinic: [facility]  Warfarin Prior to Admission: Yes  Warfarin PTA Regimen: hold 7/26, otherwise 1.5 mg every Mon, Wed, Fri; 3 mg all other day  Recent documented change in oral intake/nutrition: Unknown  Dose Comments: hold today given supratherapeutic INR despite hold on 7/26    INR   Date Value Ref Range Status   07/28/2024 3.46 (H) 0.85 - 1.15 Final   07/26/2024 3.70 (H) 0.85 - 1.15 Final       Recommend no warfarin today.  Pharmacy will monitor Linda Loredo daily and order warfarin doses to achieve specified goal.      Please contact pharmacy as soon as possible if the warfarin needs to be held for a procedure or if the warfarin goals change.

## 2024-07-29 NOTE — PLAN OF CARE
Problem: Adult Inpatient Plan of Care  Goal: Absence of Hospital-Acquired Illness or Injury  Intervention: Prevent Skin Injury  Recent Flowsheet Documentation  Taken 7/29/2024 0007 by Dieudonne Redmond RN  Body Position:   position changed independently   legs elevated   heels elevated  Skin Protection: adhesive use limited  Device Skin Pressure Protection: absorbent pad utilized/changed     Problem: Skin or Soft Tissue Infection  Goal: Absence of Infection Signs and Symptoms  Intervention: Minimize and Manage Infection Progression  Recent Flowsheet Documentation  Taken 7/29/2024 0007 by Dieudonne Redmond RN  Infection Prevention:   cohorting utilized   personal protective equipment utilized   rest/sleep promoted   hand hygiene promoted   single patient room provided     Problem: Skin Injury Risk Increased  Goal: Skin Health and Integrity  Intervention: Plan: Nurse Driven Intervention: Moisture Management  Recent Flowsheet Documentation  Taken 7/29/2024 0007 by Dieudonne Redmond RN  Moisture Interventions:   Urinary collection device   No brief in bed  Intervention: Plan: Nurse Driven Intervention: Friction and Shear  Recent Flowsheet Documentation  Taken 7/29/2024 0007 by Dieudonne Redmond RN  Friction/Shear Interventions: Silicone foam sacral dressing  Intervention: Optimize Skin Protection  Recent Flowsheet Documentation  Taken 7/29/2024 0007 by Dieudonne Redmond RN  Skin Protection: adhesive use limited  Activity Management: activity adjusted per tolerance  Head of Bed (HOB) Positioning: HOB at 20-30 degrees   Goal Outcome Evaluation:        Pt is A/O X 4, on room air, assist of two, lift device Purewick in place with 550ml output. Pt gets irritable, doesn't want to be touch, on isolation status for MRSA, dressing on the wound is intact.    1255:Am, complained body pain 6/10 PRN tylenol given.  Pt prefers her room door to stay open,keep  light et TV on. Pt's MRI safety questionnaire form  completed and faxed.

## 2024-07-29 NOTE — DISCHARGE INSTRUCTIONS
RLE - Every other day (may do daily if exudate is draining through outer dressing)   Moisten wash cloth or towel with Vashe PS# [id number] and place over RLE from ankle to knee for 10 - 15 minutes.  Place Aquacel Ag PS# [id number] over all open areas.  Cut Exudry 24 x 36 PS# [id number]  in half and wrap around RLE - may use tape to secure in place or wrap roll gauze around the edge.  *When removing old dressing, ok to moisten Aquacel Ag with water or saline to ease removal of dressing.     R buttock/posterior thigh  Cleanse during sreekanth cares, pat dry  Apply a generous layer of TRIAD PASTE to cover entire area; BID and prn with stooling/cleansing  When cleansing, remove soiled paste only, no need to clean off all triad paste  Apply additional TRIAD to keep skin covered and protected

## 2024-07-29 NOTE — H&P
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    RLE wound infection  Pressure ulcer, coccyx   -Apparently getting worse since the discharge from [facility] (7/16/24). Patient is very irritable and unco-operative during my visit, and didn't let me examine the wound. Patient stated \"the ED doc looked at it\"; and is very painful  -Check CRP, ESR and procal  -WOC consult  - Cont IV vancomycin and meropenem started in ED. Consult ID. Recent MRI on 7/13/24 was negative for ostomyelitis. MRI ordered from ED. Result is pending.    Acute on chronic pain  -On methadone 5mg 4 times daily, and TID prn. Patient reports severe pain, and wanting something for it. Daughter states she doesn't do well with morphine. Will order PO dilaudid prn. Consider pain team consult if pain control continues to be an issue    DM-II  -Uncontrolled with A1c of 9.9  -Cont home Tresiba. Add sliding scale insulin and mealtime novolog    Paroxysmal afib  -Rate controlled  -Cont coumadin. INR is therapeutic. Pharmacy to dose    Other chronic issues  Hyperlipidemia  Hypertension  Chronic right sided heart failure  Chronic normocytic anemia  CKD-III  Untreated BERLIN  Obesity  Fibromyalgia  - Stable. 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    Patient is currently very irritated and unco-operative during my visit. History obtained from patient's daughter at bedside. It appears the patient was admitted at [facility] from 7/11/24- 7/16/24 with altered mental status due to infection (UTI and/or leg cellulitis). Encephalopathy improved with antibiotics. She was treated with IV zosyn. MRI showed no e/o osteomyelitis. Blood culture was negative. Discharged to TCU on PO augmentin x 5 days. Per patient/family, the wound in her RLE is getting worse since. Also starting to have some pressure ulcer in the coccyx. The culture from the right leg wound reviewed, and it showed 2 strains of P aeruginosa, ESBL ecoli, MRSA and enterococcus faecalis. Therefore, sent to our ED for further evaluation.    PMH/PSH:  Patient Active Problem List   Diagnosis    Type 2 diabetes mellitus with complication, with long-term current use of insulin (H)    Essential hypertension with goal blood pressure less than 140/90    Mixed stress and urge urinary incontinence    Insomnia    Mixed hyperlipidemia    Obesity with BMI >35 and comorbidity    Diabetic polyneuropathy associated with type 2 diabetes mellitus (H)    Osteoarthritis    BERLIN (obstructive sleep apnea)    Fibromyalgia    Allergic rhinitis    Anticoagulation monitoring, INR range 2-3    Microalbuminuria due to type 2 diabetes mellitus (H)    Chronic atrial fibrillation (H)    Osteopenia    Vitamin D deficiency    Umbilical hernia without obstruction and without gangrene    Primary hyperparathyroidism (H24)    Chronic pain    Opioid dependence, uncomplicated (H)    Chronic right-sided heart failure (H)    Cellulitis of right lower extremity    Chronic kidney disease, stage 3b (H)    Cardiac murmur, unspecified    Hx of osteomyelitis    Lymphedema, not elsewhere  classified    Constipation    Other abnormalities of gait and mobility    Venous stasis    Acute kidney injury (H24)    Sepsis, due to unspecified organism, unspecified whether acute organ dysfunction present (H)    Metabolic encephalopathy    Pressure injury of deep tissue of sacral region    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)    Anemia    Pulmonary hypertension (H)    Acute cystitis without hematuria    Ulcer of right lower extremity (H)    Open wound of skin       Past Medical History:   Diagnosis Date    Abscess of left foot 10/31/2022    Adverse effect of anticoagulants, initial encounter 2024    GUSTAVO (acute kidney injury) (H24) 2023    Anxiety 10/20/2011    Cellulitis - bilateral lower extremities 2023    Cellulitis of buttock 2023    Decubitus ulcers - 5 present on buttocks/perineal region, numerous scabbed over and unstagable on shin and bilateral feet with some bloody blisters noted on feet 2023    Depressive disorder, not elsewhere classified     Diabetic foot ulcer (H) 2023    Elevated liver enzymes 10/20/2011    Fracture of fibula, distal, left, closed 10/20/2011    ORIF 10/13/11      Herpes zoster 2005: On gabapentin and lidoderm. She has been switched from gabapentin to lyrica.       Herpes zoster without mention of complication     Hypercalcemia 2007    Hypoglycemia 2023    Hypotension 10/27/2011    Major depressive disorder, recurrent episode, moderate (H) 2008    Mild intermittent asthma     Mixed hyperlipidemia due to type 2 diabetes mellitus (H) 2008    Formatting of this note is different from the original.     22 ASCVD 10 year risk: 37.9%      Last Lipids:  Last Lipids:  Chol: 2021 130   63  HDL: 2021 46  Non-HDL: 2021 84  Chol/HDL Ratio: 2021 2.83  LDL DIRECT: . No results found in past 5 years   LDL CHOLESTEROL (mg/dL)   Date Value   2021  71      07/20/22   The 10-year ASCVD risk score (Teddy DC Jr.    Non-healing wound of left heel 09/22/2023    Osteomyelitis (H) 10/24/2023    Other urinary incontinence     Sepsis without acute organ dysfunction, due to unspecified organism (H) 05/26/2023    Skin ulcer of right lower leg with fat layer exposed (H) 02/26/2024    Supratherapeutic INR 05/27/2023    Type II or unspecified type diabetes mellitus with renal manifestations, uncontrolled(250.42) (H)     Type II or unspecified type diabetes mellitus without mention of complication, not stated as uncontrolled     Unspecified essential hypertension     Unspecified open wound, left foot, subsequent encounter 09/22/2023    UTI (urinary tract infection) - possible 05/26/2023    Warfarin-induced coagulopathy (H24) 05/27/2023        Past Surgical History:   Procedure Laterality Date    AMPUTATE LEG BELOW KNEE Left 10/7/2023    Procedure: LEFT GUILLOTINE BELOW KNEE AMPUTATION.;  Surgeon: Lan Otto MD;  Location:  OR    AMPUTATE LEG BELOW KNEE Left 10/14/2023    Procedure: debridement of left below the knee amputation;  Surgeon: Lan Otto MD;  Location:  OR    APPLY WOUND VAC Left 1/2/2024    Procedure: WOUND VAC APPLICATION TO LEFT BELOW KNEE STUMP;  Surgeon: Lan Otto MD;  Location:  OR    CHOLECYSTECTOMY      GRAFT SKIN SPLIT THICKNESS FROM TRUNK TO HEAD Left 1/2/2024    Procedure: APPLICATION OF SPLIT-THICKNESS SKIN GRAFT TO LEFT BELOW KNEE STUMP, GRAFT FROM LEFT THIGH;  Surgeon: Lan Otto MD;  Location: [facility] OR    INCISION AND DRAINAGE FOOT, COMBINED Left 9/26/2023    Procedure: Surgical debridement of all nonviable skin and soft tissue and bone left foot;  Surgeon: Nolberto Thorne DPM;  Location: [facility] OR    IR LOWER EXTREMITY ANGIOGRAM LEFT  10/3/2023    IRRIGATION AND DEBRIDEMENT LOWER EXTREMITY, COMBINED Right 7/14/2024    Procedure: IRRIGATION AND DEBRIDEMENT, LOWER EXTREMITY, RIGHT LOWER LEG;   Surgeon: Phuong Gutierrez MD;  Location: [facility] OR    ligation of fallopian tube      OPEN REDUCTION INTERNAL FIXATION ANKLE  10/13/11    left    pinning of left 2nd toe            ALLERGIES:  Allergies   Allergen Reactions    Prednisone Nausea and Vomiting    Morphine Nausea and Vomiting    Morphine [Fumaric Acid] Nausea and Vomiting    Nitrofurantoin Unknown     Per nursing home       MEDICATIONS:  Reviewed.  Current Facility-Administered Medications   Medication Dose Route Frequency Provider Last Rate Last Admin    acetaminophen (TYLENOL) tablet 1,000 mg  1,000 mg Oral Q8H PRN Bairagi, Dain B, MBBS        atorvastatin (LIPITOR) tablet 40 mg  40 mg Oral QPM Bairagi, Dain B, MBBS        Benzocaine (HURRICAINE/TOPEX) 20 % spray 0.5-1 mL  1-2 spray Mouth/Throat Daily IngridiDain B, MBBS        calcium carbonate (TUMS) chewable tablet 1,000 mg  1,000 mg Oral 4x Daily PRN Baitayi, Dain B, MBBS        glucose gel 15-30 g  15-30 g Oral Q15 Min PRN Baitayi, Dain B, MBBS        Or    dextrose 50 % injection 25-50 mL  25-50 mL Intravenous Q15 Min PRN Baitayi, Dain B, MBBS        Or    glucagon injection 1 mg  1 mg Subcutaneous Q15 Min PRN Baitayi, Dain B, MBBS        hydrALAZINE (APRESOLINE) tablet 10 mg  10 mg Oral Q4H PRN Bairagi, Dain B, MBBS        Or    hydrALAZINE (APRESOLINE) injection 10 mg  10 mg Intravenous Q4H PRN Bairagi, Dain B, MBBS        HYDROmorphone (DILAUDID) tablet 2 mg  2 mg Oral Q4H PRN Ingridi, Dain B, MBBS        [START ON 7/29/2024] insulin aspart (NovoLOG) injection (RAPID ACTING)  1-7 Units Subcutaneous TID AC Dain Marie B MBJOHAN        insulin aspart (NovoLOG) injection (RAPID ACTING)  1-5 Units Subcutaneous At Bedtime Dain Marie, MBBS        [START ON 7/29/2024] insulin aspart (NovoLOG) injection (RAPID ACTING)   Subcutaneous TID w/meals Dain Marie, MBBS        [START ON 7/29/2024] insulin degludec (TRESIBA) 200 UNIT/ML injection 34 Units  34 Units Subcutaneous  QAM Bairagi, Dain B, MBBS        lidocaine (LMX4) cream   Topical Q1H PRN Bairagi, Dain B, MBBS        lidocaine 1 % 0.1-1 mL  0.1-1 mL Other Q1H PRN Bairagi, Dain B, MBBS        meropenem (MERREM) 1 g vial to attach to  mL bag  1 g Intravenous Q8H Bairagi, Dain B, MBBS        methadone (DOLOPHINE) tablet 5 mg  5 mg Oral 4x Daily Bairagi, Dain B, MBBS        miconazole (MICATIN) 2 % powder   Topical BID Bairagi, Dain B, MBBS        ondansetron (ZOFRAN ODT) ODT tab 4 mg  4 mg Oral Q6H PRN Bairagi, Dain B, MBBS        ondansetron (ZOFRAN ODT) ODT tab 4 mg  4 mg Oral Q6H PRN Bairagi, Dain B, MBBS        Or    ondansetron (ZOFRAN) injection 4 mg  4 mg Intravenous Q6H PRN Bairagi, Dain B, MBBS        Patient is already receiving anticoagulation with heparin, enoxaparin (LOVENOX), warfarin (COUMADIN)  or other anticoagulant medication   Does not apply Continuous PRN Bairagi, Dain B, MBBS        polyethylene glycol (MIRALAX) powder 17 g  17 g Oral BID PRN Bairagi, Dain B, MBBS        potassium chloride ER (K-TAB) TBCR 20 mEq  20 mEq Oral BID Bairagi, Dain B, MBBS        senna-docusate (SENOKOT-S/PERICOLACE) 8.6-50 MG per tablet 1 tablet  1 tablet Oral BID PRN Bairagi, Dain B, MBBS        sodium chloride (PF) 0.9% PF flush 3 mL  3 mL Intracatheter Q8H Bairagi, Dain B, MBBS        sodium chloride (PF) 0.9% PF flush 3 mL  3 mL Intracatheter q1 min prn Bairagi, Dain B, MBBS        [START ON 7/29/2024] Tab-A-Carey TABS 1 tablet  1 tablet Oral Daily with lunch Bairagi, Dain B, MBBS        torsemide (DEMADEX) tablet 60 mg  60 mg Oral Daily Bairagi, Dain B, MBBS        Warfarin Dose Required Daily - Pharmacist Managed  1 each Oral See Admin Instructions Dain Marie MBBS           Current Facility-Administered Medications:     acetaminophen (TYLENOL) tablet 1,000 mg, 1,000 mg, Oral, Q8H PRN, Dain Marie MBBS    atorvastatin (LIPITOR) tablet 40 mg, 40 mg, Oral, QPM, Cynthia  ARLENE Zuñiga    Benzocaine (HURRICAINE/TOPEX) 20 % spray 0.5-1 mL, 1-2 spray, Mouth/Throat, Daily, Dain Marie MBBS    calcium carbonate (TUMS) chewable tablet 1,000 mg, 1,000 mg, Oral, 4x Daily PRN, Dain Marie MBBS    glucose gel 15-30 g, 15-30 g, Oral, Q15 Min PRN **OR** dextrose 50 % injection 25-50 mL, 25-50 mL, Intravenous, Q15 Min PRN **OR** glucagon injection 1 mg, 1 mg, Subcutaneous, Q15 Min PRN, Dain Marie MBBS    hydrALAZINE (APRESOLINE) tablet 10 mg, 10 mg, Oral, Q4H PRN **OR** hydrALAZINE (APRESOLINE) injection 10 mg, 10 mg, Intravenous, Q4H PRN, Dain Marie MBBS    HYDROmorphone (DILAUDID) tablet 2 mg, 2 mg, Oral, Q4H PRN, Dain Marie MBBS    [START ON 7/29/2024] insulin aspart (NovoLOG) injection (RAPID ACTING), 1-7 Units, Subcutaneous, TID AC, Dain Marie MBBS    insulin aspart (NovoLOG) injection (RAPID ACTING), 1-5 Units, Subcutaneous, At Bedtime, Dain Marie MBBS    [START ON 7/29/2024] insulin aspart (NovoLOG) injection (RAPID ACTING), , Subcutaneous, TID w/meals, Dain Marie MBBS    [START ON 7/29/2024] insulin degludec (TRESIBA) 200 UNIT/ML injection 34 Units, 34 Units, Subcutaneous, QAM, Dain Marie MBBS    lidocaine (LMX4) cream, , Topical, Q1H PRN, Dain Marie MBBS    lidocaine 1 % 0.1-1 mL, 0.1-1 mL, Other, Q1H PRN, Dain Marie MBBS    meropenem (MERREM) 1 g vial to attach to  mL bag, 1 g, Intravenous, Q8H, Dain Marie MBBS    methadone (DOLOPHINE) tablet 5 mg, 5 mg, Oral, 4x Daily, Dain Marie MBBS    miconazole (MICATIN) 2 % powder, , Topical, BID, Dain Marie MBBS    ondansetron (ZOFRAN ODT) ODT tab 4 mg, 4 mg, Oral, Q6H PRN, Dain Marie MBBS    ondansetron (ZOFRAN ODT) ODT tab 4 mg, 4 mg, Oral, Q6H PRN **OR** ondansetron (ZOFRAN) injection 4 mg, 4 mg, Intravenous, Q6H PRN, Dain Marie MBBS    Patient is already receiving anticoagulation with heparin, enoxaparin  (LOVENOX), warfarin (COUMADIN)  or other anticoagulant medication, , Does not apply, Continuous PRN, Dain Marie, MBBS    polyethylene glycol (MIRALAX) powder 17 g, 17 g, Oral, BID PRN, Dain Marie, MBBS    potassium chloride ER (K-TAB) TBCR 20 mEq, 20 mEq, Oral, BID, Dain Marie, MBBS    senna-docusate (SENOKOT-S/PERICOLACE) 8.6-50 MG per tablet 1 tablet, 1 tablet, Oral, BID PRN, Dain Marie B, MBBS    sodium chloride (PF) 0.9% PF flush 3 mL, 3 mL, Intracatheter, Q8H, Dain Marie, MBBS    sodium chloride (PF) 0.9% PF flush 3 mL, 3 mL, Intracatheter, q1 min prn, Dain Marie, MBJOHAN    [START ON 7/29/2024] Tab-A-Carey TABS 1 tablet, 1 tablet, Oral, Daily with lunch, Dain Marie MBBS    torsemide (DEMADEX) tablet 60 mg, 60 mg, Oral, Daily, Dain Marie MBJOHAN    Warfarin Dose Required Daily - Pharmacist Managed, 1 each, Oral, See Admin Instructions, Dain Marie MBBS   Scheduled Meds:  Current Facility-Administered Medications   Medication Dose Route Frequency Provider Last Rate Last Admin    atorvastatin (LIPITOR) tablet 40 mg  40 mg Oral QPM Dain Marie MBBS        Benzocaine (HURRICAINE/TOPEX) 20 % spray 0.5-1 mL  1-2 spray Mouth/Throat Daily Dain Marie MBBS        [START ON 7/29/2024] insulin aspart (NovoLOG) injection (RAPID ACTING)  1-7 Units Subcutaneous TID AC Dain Marie MBBS        insulin aspart (NovoLOG) injection (RAPID ACTING)  1-5 Units Subcutaneous At Bedtime Dain Marie MBBS        [START ON 7/29/2024] insulin aspart (NovoLOG) injection (RAPID ACTING)   Subcutaneous TID w/meals Dain Marie MBBS        [START ON 7/29/2024] insulin degludec (TRESIBA) 200 UNIT/ML injection 34 Units  34 Units Subcutaneous QAM Dain Marie MBBS        meropenem (MERREM) 1 g vial to attach to  mL bag  1 g Intravenous Q8H Dain Marie MBBS        methadone (DOLOPHINE) tablet 5 mg  5 mg Oral 4x Daily Dain Marie MBBS         miconazole (MICATIN) 2 % powder   Topical BID Dain Marie B, MBBS        potassium chloride ER (K-TAB) TBCR 20 mEq  20 mEq Oral BID IngridiZainak B, MBBS        sodium chloride (PF) 0.9% PF flush 3 mL  3 mL Intracatheter Q8H Dain Marie B, MBBS        [START ON 7/29/2024] Tab-A-Carey TABS 1 tablet  1 tablet Oral Daily with lunch Dain Marie, MBJOHAN        torsemide (DEMADEX) tablet 60 mg  60 mg Oral Daily IngridiDain B, MBBS        Warfarin Dose Required Daily - Pharmacist Managed  1 each Oral See Admin Instructions Dain Marie B, MBBS         Continuous Infusions:  Current Facility-Administered Medications   Medication Dose Route Frequency Provider Last Rate Last Admin    Patient is already receiving anticoagulation with heparin, enoxaparin (LOVENOX), warfarin (COUMADIN)  or other anticoagulant medication   Does not apply Continuous PRN Dain Marie B, MBBS         PRN Meds:.  Current Facility-Administered Medications   Medication Dose Route Frequency Provider Last Rate Last Admin    acetaminophen (TYLENOL) tablet 1,000 mg  1,000 mg Oral Q8H PRN Baitayi, Dain B, MBBS        calcium carbonate (TUMS) chewable tablet 1,000 mg  1,000 mg Oral 4x Daily PRN Baitayi, Dain B, MBBS        glucose gel 15-30 g  15-30 g Oral Q15 Min PRN Bairagi, Dain B, MBBS        Or    dextrose 50 % injection 25-50 mL  25-50 mL Intravenous Q15 Min PRN Baitayi, Dain B, MBBS        Or    glucagon injection 1 mg  1 mg Subcutaneous Q15 Min PRN Bairagi, Dain B, MBBS        hydrALAZINE (APRESOLINE) tablet 10 mg  10 mg Oral Q4H PRN Bairagi, Dain B, MBBS        Or    hydrALAZINE (APRESOLINE) injection 10 mg  10 mg Intravenous Q4H PRN Bairagi, Dain B, MBBS        HYDROmorphone (DILAUDID) tablet 2 mg  2 mg Oral Q4H PRN Bairagi, Dain B, MBBS        lidocaine (LMX4) cream   Topical Q1H PRN Ingridi, Dain B, MBBS        lidocaine 1 % 0.1-1 mL  0.1-1 mL Other Q1H PRN Dain Marie, ARLENE        ondansetron  (ZOFRAN ODT) ODT tab 4 mg  4 mg Oral Q6H PRN Dain Marie B, MBJOHAN        ondansetron (ZOFRAN ODT) ODT tab 4 mg  4 mg Oral Q6H PRN Dain Marie, MBJOHAN        Or    ondansetron (ZOFRAN) injection 4 mg  4 mg Intravenous Q6H PRN Dain Marie, MBJOHAN        Patient is already receiving anticoagulation with heparin, enoxaparin (LOVENOX), warfarin (COUMADIN)  or other anticoagulant medication   Does not apply Continuous PRN Dain Marie, ARLENE        polyethylene glycol (MIRALAX) powder 17 g  17 g Oral BID PRN Dain Marie, ARLENE        senna-docusate (SENOKOT-S/PERICOLACE) 8.6-50 MG per tablet 1 tablet  1 tablet Oral BID PRN Dain Marie, ARLENE        sodium chloride (PF) 0.9% PF flush 3 mL  3 mL Intracatheter q1 min prn Dain Marie MBBS           SOCIAL HISTORY:  Social History     Socioeconomic History    Marital status:      Spouse name: Not on file    Number of children: Not on file    Years of education: Not on file    Highest education level: Not on file   Occupational History    Not on file   Tobacco Use    Smoking status: Never    Smokeless tobacco: Never   Substance and Sexual Activity    Alcohol use: No    Drug use: No    Sexual activity: Never   Other Topics Concern    Not on file   Social History Narrative    Not on file     Social Determinants of Health     Financial Resource Strain: Medium Risk (3/7/2022)    Received from [facility], [facility]    Financial Resource Strain     Difficulty of Paying Living Expenses: 1     Difficulty of Paying Living Expenses: 2   Food Insecurity: No Food Insecurity (3/7/2022)    Received from [facility], [facility]    Food Insecurity     Worried About Running Out of Food in the Last Year: 1   Transportation Needs: No Transportation Needs (3/7/2022)    [facility]  [facility]    Transportation Needs     Lack of Transportation (Medical): 1   Physical Activity: Not on file   Stress: Not on file   Social Connections: Unknown (3/9/2023)    [facility]    Social Connections     Frequency of Communication with Friends and Family: Not on file   Interpersonal Safety: Not on file   Housing Stability: Low Risk  (3/7/2022)    [facility] [employer] [facility], [employer] [facility]    Housing Stability     Unable to Pay for Housing in the Last Year: 1       FAMILY HISTORY:  Family History   Problem Relation Age of Onset    Hypertension Mother     Heart Disease Father         heart attack and cardiomegaly    Diabetes Sister         type 2    Hypertension Sister     Respiratory Sister     Psychotic Disorder Sister         bipolar    Cancer Maternal Grandmother         throat    Cancer Paternal Grandmother         gastric        ROS:  12 point review of systems reviewed and is negative except for what has already been mentioned in HPI.       PHYSICAL EXAM:  GENRL:  Not in acute distress   /54 (BP Location: Left arm)   Pulse 94   Temp 98.5  F (36.9  C) (Oral)   Resp 16   Wt 104.3 kg (230 lb)   SpO2 94%   BMI 37.12 kg/m    No intake/output data recorded.  No intake/output data recorded.  HEENT: NC/AT      Eyes-  Pupil round and reactive to light bilaterally       Neck- supple, no JVP elevation,       Sclera- anicteric      Oropharynx- moist and pink  CHEST: Clear to auscultation bilateral anteriorly, no ronchi or wheezing  HEART: S1S2 normal, regular.   ABDMN: Soft. Non-tender, non-distended.  No guarding or rigidity. Bowel sounds- active  EXTRM: left BKA. Couple of scabs at the stump. The wound on the right LE is circumferential. Patient didn't let me examine the wound  NEURO: Awake, irritable.  Cranial nerves II-XII grossly intact. No focal neurological deficit.        DIAGNOSTIC DATA:    Recent Labs   Lab 07/28/24  1527 07/24/24  0530   WBC 8.2 9.2   HGB 9.5* 10.6*   HCT 28.8* 33.2*    301       Recent Labs   Lab 07/28/24  1527 07/22/24  0510    134*   CO2 33* 28   BUN 30.3* 36.1*       Recent Labs   Lab 07/28/24  1527 07/26/24  0530 07/25/24  0000   INR 3.46* 3.70* 5.0*       Recent Labs   Lab 07/28/24  1527 07/22/24  0510    134*   CO2 33* 28   BUN 30.3* 36.1*   ALBUMIN 2.5*  --    ALKPHOS 103  --    ALT 17  --    AST 20  --        [unfilled]    CTA Lower Extremity Right with Contrast    Result Date: 7/14/2024  EXAM: CTA LOWER EXTREMITY RIGHT WITH CONTRAST LOCATION: [facility] DATE: 7/14/2024 INDICATION: Nonhealing right lower extremity ulcer. COMPARISON: None. TECHNIQUE: Helical acquisition through the right lower extremity was performed during the arterial phase of contrast enhancement using IV Contrast. 2D and 3D reconstructions were performed by the CT technologist. Dose reduction techniques were used. CONTRAST: 100mL isovue 370 FINDINGS: ANGIOGRAM: Abdominal Aorta: Mild to moderate calcifications of the visualized abdominal aorta. Right Common Iliac Artery: Patent. Right External Iliac Artery: Patent. Right Internal Iliac Artery: Patent. Right Common Femoral Artery: Patent. Right Superficial Femoral Artery: Patent. Extensive vascular calcifications without significant stenosis. Right Deep Femoral Artery: Patent. Right Popliteal Artery: Patent. Extensive vascular calcifications without significant stenosis. Right Tibioperoneal Trunk: Patent. Right Anterior Tibial Artery: Patent. Right Posterior Tibial Artery: Patent. Right Peroneal Artery: Patent. OTHER FINDINGS: Status post cholecystectomy. Visualized liver and right kidney are grossly unremarkable. Large amount of stool in the rectum, which is distended to 8.8 cm in diameter. Mild rectal wall  thickening and perirectal fat stranding. Large amount of stool in the  visualized colon. Visualized small bowel appears grossly unremarkable. Visualized uterus appears grossly unremarkable. Visualized urinary bladder appears grossly unremarkable. Severe atrophy of the right gastrocnemius muscle and moderate atrophy of the soleus muscle. Atrophy of the intrinsic foot musculature. No acute fracture or dislocation. Multilevel degenerative changes of the visualized spine. Severe tricompartmental degenerative changes of the right knee. Moderate degenerative changes of the midfoot. No cortical erosion, focal osteolysis, or periosteal reaction to suggest CT evidence of osteomyelitis. Status post left below-the-knee amputation seen on  radiograph. Mild skin thickening and subcutaneous edema of the right leg. Moderate subcutaneous edema of the dorsal foot as well as adjacent to the calcaneus. No rim-enhancing fluid collection identified.     IMPRESSION: 1.  No significant arterial stenosis or thrombosis in the right lower extremity. Three-vessel runoff to the foot. 2.  Large amount of stool in the distended rectum, with mild wall thickening and fat stranding. Correlate for fecal impaction and stercoral colitis.    MR Foot Right w/o & w Contrast    Result Date: 7/13/2024  EXAM: MR FOOT RIGHT W/O and W CONTRAST LOCATION: [facility] DATE: 07/13/2024 INDICATION: Ulcer, possible osteomyelitis, pain and swelling. COMPARISON: None. TECHNIQUE: Routine. Additional post-gadolinium T1 sequences were obtained. IV CONTRAST: Gadavist 9.5 mL. FINDINGS: JOINTS AND BONES: -No evidence for fracture. No evidence for osteomyelitis. No effusion to suggest septic arthropathy. Degenerative change 1st MTP joint. Hammertoe deformities. TENDONS: -The flexor and extensor tendons are negative for tendinopathy, tenosynovitis, or tearing. The hallucis tendons are negative. LIGAMENTS: -The ligament of Lisfranc is intact.  The collateral ligaments at the MTP joints are all intact. MUSCLES AND SOFT TISSUES: -Fatty infiltration and atrophy of the plantar and interosseous musculature. Edema or cellulitis along the dorsal aspect of the foot.     IMPRESSION: 1.  No evidence for osteomyelitis, septic arthropathy, or abscess. 2.  No evidence for fracture. 3.  Hammertoe deformities. 4.  Degenerative change 1st MTP joint. 5.  Fatty infiltration and atrophy of the plantar and interosseous musculature. 6.  Edema or cellulitis along the dorsal aspect of the foot.    US Lower Extremity Arterial Duplex Right    Result Date: 7/13/2024  EXAM: US LOWER EXTREMITY ARTERIAL DUPLEX RIGHT LOCATION: [facility] DATE: 7/13/2024 INDICATION: Nonhealing right lower extremity ulcers COMPARISON: 9/30/2023 TECHNIQUE: Duplex utilizing 2D gray-scale imaging, Doppler interrogation with color-flow and spectral waveform analysis. FINDINGS: Diffuse right lower extremity arterial atherosclerosis with no significant focal stenoses. Since 9/30/2023, blood flow has become sluggish with damped monophasic waveforms. RIGHT LOWER EXTREMITY ARTERIAL ASSESSMENT: Common femoral artery: 147  cm/s, biphasic Profunda femoris artery: 111  cm/s, biphasic SFA (proximal): 139  cm/s, monophasic SFA (mid): 165  cm/s, monophasic SFA (distal): 148  cm/s, monophasic Popliteal artery: 152  cm/s, monophasic Anterior tibial artery: 47  cm/s, monophasic Posterior tibial artery: 73  cm/s, monophasic Dorsalis pedis artery: 70  cm/s, monophasic     IMPRESSION: Sluggish right lower extremity arterial blood flow with no focal stenoses     US ALMA with PPG wo Exercise Right    Result Date: 7/12/2024  EXAM: RESTING ANKLE-BRACHIAL INDICES (ABIs) LOCATION: [facility] DATE: 7/12/2024 INDICATION: non healing ulcers COMPARISON: None. ALMA FINDINGS: RIGHT Brachial: 119 Ankle (PT): Noncompressible vessels Ankle (DP): Noncompressible vessels Digit: 122 Index: 1.03  The right ALMA at rest cannot be obtained. WAVEFORMS: The dorsalis pedis and posterior tibial arteries are monophasic.     IMPRESSION: 1.  RIGHT LOWER EXTREMITY: ALMA cannot be obtained due to noncompressible vessels. Monophasic waveforms on the right suggests arterial disease which could be further evaluated with duplex ultrasound as clinically indicated.     XR Foot Right 2 Views    Result Date: 7/12/2024  FOOT TWO VIEWS RIGHT  7/12/2024 12:23 PM HISTORY: ulcer, possible osteomyelitis, lateral mid foot COMPARISON: None.     IMPRESSION: Limited evaluation due to positioning and osteopenia. Diffuse soft tissue swelling. No definite evidence of acute osteomyelitis or fracture. Mild-to-moderate degenerative changes throughout the foot. Small plantar calcaneal enthesophyte. Bony bunion. Vascular calcification. KENNETH DOLL MD   SYSTEM ID:  [id number]      Patient's new lab studies reviewed personally.  Patient's new radiology reports reviewed personally.  I personally viewed and personally interpreted patient's EKG:     Note created using dragon voice recognition software.  Errors in spelling or words which seems out of context are unintentional.  Sounds alike errors may have escaped editing.     07/28/2024  Dain Marie MD  HOSPITALIST, [facility]  PAGER NO. [phone]

## 2024-07-29 NOTE — PHARMACY-VANCOMYCIN DOSING SERVICE
Pharmacy Vancomycin Initial Note  Date of Service 2024  Patient's  1945  79 year old, female    Indication: Skin and Soft Tissue Infection    Current estimated CrCl = Estimated Creatinine Clearance: 61.9 mL/min (based on SCr of 0.9 mg/dL).    Creatinine for last 3 days  2024:  3:27 PM Creatinine 0.90 mg/dL    Recent Vancomycin Level(s) for last 3 days  No results found for requested labs within last 3 days.      Vancomycin IV Administrations (past 72 hours)                     vancomycin (VANCOCIN) 2,000 mg in sodium chloride 0.9 % 500 mL intermittent infusion (mg) 2,000 mg New Bag 24 1734                    Nephrotoxins and other renal medications (From now, onward)      Start     Dose/Rate Route Frequency Ordered Stop    24 1700  vancomycin (VANCOCIN) 1,500 mg in sodium chloride 0.9 % 250 mL intermittent infusion         1,500 mg  over 90 Minutes Intravenous EVERY 24 HOURS 24 0900  torsemide (DEMADEX) tablet 60 mg        Note to Pharmacy: PTA Sig:Take 60 mg by mouth daily      60 mg Oral DAILY 24 1933              Contrast Orders - past 72 hours (72h ago, onward)      None            [employer] Prediction of Planned Initial Vancomycin Regimen  Regimen: 1500 mg IV every 24 hours.  Start time: 05:34 on 2024  Exposure target: AUC24 (range)400-600 mg/L.hr   AUC24,ss: 442 mg/L.hr  Probability of AUC24 > 400: 61 %  Ctrough,ss: 12.9 mg/L  Probability of Ctrough,ss > 20: 15 %  Probability of nephrotoxicity (Lodise COSTA ): 8 %        Plan:  Start vancomycin 1500 mg IV q24h.   Vancomycin monitoring method: AUC  Vancomycin therapeutic monitoring goal: 400-600 mg*h/L  Pharmacy will check vancomycin levels as appropriate in 1-3 Days.    Serum creatinine levels will be ordered daily for the first week of therapy and at least twice weekly for subsequent weeks.      LARS VERAS, [facility]

## 2024-07-29 NOTE — PLAN OF CARE
Problem: Adult Inpatient Plan of Care  Goal: Optimal Comfort and Wellbeing  Intervention: Monitor Pain and Promote Comfort  Recent Flowsheet Documentation  Taken 7/29/2024 1321 by Blanquita Encarnacion, RN  Pain Management Interventions: medication (see MAR)  Taken 7/29/2024 0924 by Blanquita Encarnacion, RN  Pain Management Interventions: medication (see MAR)   Goal Outcome Evaluation:  Pain meds given for wound care interventions. Prior to pain meds being given, was crying in pain and very irritable with staff and verbally rude.  Pt stated that she was more comfortable prior to dressing change but doesn't like to give a number- doesn't like numeric system and being asked all the time.  Continues on IV atbx.

## 2024-07-29 NOTE — PLAN OF CARE
Problem: Adult Inpatient Plan of Care  Goal: Plan of Care Review  Description: The Plan of Care Review/Shift note should be completed every shift.  The Outcome Evaluation is a brief statement about your assessment that the patient is improving, declining, or no change.  This information will be displayed automatically on your shift  note.  Outcome: Not Progressing     Problem: Adult Inpatient Plan of Care  Goal: Absence of Hospital-Acquired Illness or Injury  Intervention: Prevent Skin Injury  Recent Flowsheet Documentation  Taken 7/28/2024 1900 by Jose Alfredo Bedoya RN  Skin Protection: adhesive use limited  Device Skin Pressure Protection: absorbent pad utilized/changed     Problem: Adult Inpatient Plan of Care  Goal: Optimal Comfort and Wellbeing  Intervention: Monitor Pain and Promote Comfort  Recent Flowsheet Documentation  Taken 7/28/2024 2055 by Jose Alfredo Bedoya RN  Pain Management Interventions: medication offered but refused     Problem: Risk for Delirium  Goal: Improved Behavioral Control  Outcome: Not Progressing  Intervention: Minimize Safety Risk  Recent Flowsheet Documentation  Taken 7/28/2024 1900 by Jose Alfredo Bedoya RN  Communication Enhancement Strategies:   extra time allowed for response   call light answered in person  Enhanced Safety Measures:   pain management   patient/family teach back on injury risk     Problem: Skin Injury Risk Increased  Goal: Skin Health and Integrity  Intervention: Plan: Nurse Driven Intervention: Moisture Management  Recent Flowsheet Documentation  Taken 7/28/2024 1900 by Jose Alfredo Bedoya RN  Moisture Interventions:   Urinary collection device   No brief in bed     Problem: Skin Injury Risk Increased  Goal: Skin Health and Integrity  Intervention: Plan: Nurse Driven Intervention: Friction and Shear  Recent Flowsheet Documentation  Taken 7/28/2024 1900 by Jose Alfredo Bedoya RN  Friction/Shear Interventions: Silicone foam sacral dressing    Patient  arrived to the unit around 1900. Patient is very labile and demanding. Wound on left lower extremity noted to be foul smelling, with copious amount of thick drainage noted. Covered with vaseline gauze, non adherent and kerlix. Wound on coccyx, covered with mepilex. WOC consult ordered by MD. Utilizing external catheter. Offered PRN pain medication, which patient declined, despite appearing to be in pain. Patient appears to be unkept. On isolation status for MRSA.     Jose Alfredo Bedoya RN

## 2024-07-29 NOTE — CONSULTS
Infectious Disease Consultation:  Requesting MD:  Reason for consult:      HISTORY:  Pt in from [facility] center for wound infection, new wound coccyx and worsening polymicrobial wound of the RLE.  The RLE wound micro is pasted below.  She is on merrem and vanco.  There is mention of MRI being done in ED, not reported yet.  Recently treated for same at [facility] with negative MRI and given oral abx, this was mid July.            Pertinent past history, past infectious disease history:        Patient Active Problem List   Diagnosis    Type 2 diabetes mellitus with complication, with long-term current use of insulin (H)    Essential hypertension with goal blood pressure less than 140/90    Mixed stress and urge urinary incontinence    Insomnia    Mixed hyperlipidemia    Obesity with BMI >35 and comorbidity    Diabetic polyneuropathy associated with type 2 diabetes mellitus (H)    Osteoarthritis    BERLIN (obstructive sleep apnea)    Fibromyalgia    Allergic rhinitis    Anticoagulation monitoring, INR range 2-3    Microalbuminuria due to type 2 diabetes mellitus (H)    Chronic atrial fibrillation (H)    Osteopenia    Vitamin D deficiency    Umbilical hernia without obstruction and without gangrene    Primary hyperparathyroidism (H24)    Chronic pain    Opioid dependence, uncomplicated (H)    Chronic right-sided heart failure (H)    Cellulitis of right lower extremity    Chronic kidney disease, stage 3b (H)    Cardiac murmur, unspecified    Hx of osteomyelitis    Lymphedema, not elsewhere classified    Constipation    Other abnormalities of gait and mobility    Venous stasis    Acute kidney injury (H24)    Sepsis, due to unspecified organism, unspecified whether acute organ dysfunction present (H)    Metabolic encephalopathy    Pressure injury of deep tissue of sacral region    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)    Anemia    Pulmonary hypertension (H)    Acute cystitis without hematuria    Ulcer of  right lower extremity (H)    Open wound of skin         Past Medical History        Past Medical History:   Diagnosis Date    Abscess of left foot 10/31/2022    Adverse effect of anticoagulants, initial encounter 2024    GUSTAVO (acute kidney injury) (H24) 2023    Anxiety 10/20/2011    Cellulitis - bilateral lower extremities 2023    Cellulitis of buttock 2023    Decubitus ulcers - 5 present on buttocks/perineal region, numerous scabbed over and unstagable on shin and bilateral feet with some bloody blisters noted on feet 2023    Depressive disorder, not elsewhere classified      Diabetic foot ulcer (H) 2023    Elevated liver enzymes 10/20/2011    Fracture of fibula, distal, left, closed 10/20/2011     ORIF 10/13/11      Herpes zoster 2005: On gabapentin and lidoderm. She has been switched from gabapentin to lyrica.       Herpes zoster without mention of complication      Hypercalcemia 2007    Hypoglycemia 2023    Hypotension 10/27/2011    Major depressive disorder, recurrent episode, moderate (H) 2008    Mild intermittent asthma      Mixed hyperlipidemia due to type 2 diabetes mellitus (H) 2008     Formatting of this note is different from the original.     22 ASCVD 10 year risk: 37.9%      Last Lipids:  Last Lipids:  Chol: 2021 130   63  HDL: 2021 46  Non-HDL: 2021 84  Chol/HDL Ratio: 2021 2.83  LDL DIRECT: . No results found in past 5 years   LDL CHOLESTEROL (mg/dL)   Date Value   2021 71      22   The 10-year ASCVD risk score (Teddy SMITH Jr.    Non-healing wound of left heel 2023    Osteomyelitis (H) 10/24/2023    Other urinary incontinence      Sepsis without acute organ dysfunction, due to unspecified organism (H) 2023    Skin ulcer of right lower leg with fat layer exposed (H) 2024    Supratherapeutic INR 2023    Type II or unspecified type diabetes  mellitus with renal manifestations, uncontrolled(250.42) (H)      Type II or unspecified type diabetes mellitus without mention of complication, not stated as uncontrolled      Unspecified essential hypertension      Unspecified open wound, left foot, subsequent encounter 09/22/2023    UTI (urinary tract infection) - possible 05/26/2023    Warfarin-induced coagulopathy (H24) 05/27/2023            Past Surgical History         Past Surgical History:   Procedure Laterality Date    AMPUTATE LEG BELOW KNEE Left 10/7/2023     Procedure: LEFT GUILLOTINE BELOW KNEE AMPUTATION.;  Surgeon: Lan Otto MD;  Location:  OR    AMPUTATE LEG BELOW KNEE Left 10/14/2023     Procedure: debridement of left below the knee amputation;  Surgeon: Lan Otto MD;  Location:  OR    APPLY WOUND VAC Left 1/2/2024     Procedure: WOUND VAC APPLICATION TO LEFT BELOW KNEE STUMP;  Surgeon: Lan Otto MD;  Location:  OR    CHOLECYSTECTOMY        GRAFT SKIN SPLIT THICKNESS FROM TRUNK TO HEAD Left 1/2/2024     Procedure: APPLICATION OF SPLIT-THICKNESS SKIN GRAFT TO LEFT BELOW KNEE STUMP, GRAFT FROM LEFT THIGH;  Surgeon: Lan Otto MD;  Location:  OR    INCISION AND DRAINAGE FOOT, COMBINED Left 9/26/2023     Procedure: Surgical debridement of all nonviable skin and soft tissue and bone left foot;  Surgeon: Nolberto Thorne DPM;  Location: [facility] OR    IR LOWER EXTREMITY ANGIOGRAM LEFT   10/3/2023    IRRIGATION AND DEBRIDEMENT LOWER EXTREMITY, COMBINED Right 7/14/2024     Procedure: IRRIGATION AND DEBRIDEMENT, LOWER EXTREMITY, RIGHT LOWER LEG;  Surgeon: Phuong Gutierrez MD;  Location: [facility] OR    ligation of fallopian tube        OPEN REDUCTION INTERNAL FIXATION ANKLE   10/13/11     left    pinning of left 2nd toe                   ALLERGIES:  Allergies         Allergies   Allergen Reactions    Prednisone Nausea and Vomiting    Morphine Nausea and Vomiting    Morphine [Fumaric Acid] Nausea and Vomiting     Nitrofurantoin Unknown       Per nursing home            MEDICATIONS:  Reviewed.  Current Facility-Administered Medications             Current Facility-Administered Medications   Medication Dose Route Frequency Provider Last Rate Last Admin    acetaminophen (TYLENOL) tablet 1,000 mg  1,000 mg Oral Q8H PRN Dain Marie B, MBJOHAN        atorvastatin (LIPITOR) tablet 40 mg  40 mg Oral QPM Dain Marie B, MBJOHAN        Benzocaine (HURRICAINE/TOPEX) 20 % spray 0.5-1 mL  1-2 spray Mouth/Throat Daily Dain Marie, MBJOHAN        calcium carbonate (TUMS) chewable tablet 1,000 mg  1,000 mg Oral 4x Daily PRN Dain Marie B, MBBS        glucose gel 15-30 g  15-30 g Oral Q15 Min PRN Dain Marie, MBBS         Or    dextrose 50 % injection 25-50 mL  25-50 mL Intravenous Q15 Min PRN Dain Marie B, MBBS         Or    glucagon injection 1 mg  1 mg Subcutaneous Q15 Min PRN Dain Marie B, MBJOHAN        hydrALAZINE (APRESOLINE) tablet 10 mg  10 mg Oral Q4H PRN Dain Marie B, MBJOHAN         Or    hydrALAZINE (APRESOLINE) injection 10 mg  10 mg Intravenous Q4H PRN Dain Marie B, MBJOHAN        HYDROmorphone (DILAUDID) tablet 2 mg  2 mg Oral Q4H PRN Dain Marie B, MBJOHAN        [START ON 7/29/2024] insulin aspart (NovoLOG) injection (RAPID ACTING)  1-7 Units Subcutaneous TID AC Dain Marie MBBS        insulin aspart (NovoLOG) injection (RAPID ACTING)  1-5 Units Subcutaneous At Bedtime Dain Marie MBJOHAN        [START ON 7/29/2024] insulin aspart (NovoLOG) injection (RAPID ACTING)   Subcutaneous TID w/meals Dain Marie MBJOHAN        [START ON 7/29/2024] insulin degludec (TRESIBA) 200 UNIT/ML injection 34 Units  34 Units Subcutaneous QAM Dain Marie MBJOHAN        lidocaine (LMX4) cream   Topical Q1H PRN Dain Marie, MBJOHAN        lidocaine 1 % 0.1-1 mL  0.1-1 mL Other Q1H PRN Dain Marie, MBJOHAN        meropenem (MERREM) 1 g vial to attach to  mL bag  1 g Intravenous Q8H  Bairagi, Dain B, MBBS        methadone (DOLOPHINE) tablet 5 mg  5 mg Oral 4x Daily Bairagi, Dain B, MBBS        miconazole (MICATIN) 2 % powder   Topical BID Bairagi, Dain B, MBBS        ondansetron (ZOFRAN ODT) ODT tab 4 mg  4 mg Oral Q6H PRN Bairagi, Dain B, MBBS        ondansetron (ZOFRAN ODT) ODT tab 4 mg  4 mg Oral Q6H PRN Bairagi, Dain B, MBBS         Or    ondansetron (ZOFRAN) injection 4 mg  4 mg Intravenous Q6H PRN Bairagi, Dain B, MBBS        Patient is already receiving anticoagulation with heparin, enoxaparin (LOVENOX), warfarin (COUMADIN)  or other anticoagulant medication   Does not apply Continuous PRN Bairagi, Dain B, MBBS        polyethylene glycol (MIRALAX) powder 17 g  17 g Oral BID PRN Bairagi, Dain B, MBBS        potassium chloride ER (K-TAB) TBCR 20 mEq  20 mEq Oral BID Bairagi, Dain B, MBBS        senna-docusate (SENOKOT-S/PERICOLACE) 8.6-50 MG per tablet 1 tablet  1 tablet Oral BID PRN Bairagi, Dain B, MBBS        sodium chloride (PF) 0.9% PF flush 3 mL  3 mL Intracatheter Q8H Bairagi, Dain B, MBBS        sodium chloride (PF) 0.9% PF flush 3 mL  3 mL Intracatheter q1 min prn Bairagi, Dain B, MBBS        [START ON 7/29/2024] Tab-A-Carey TABS 1 tablet  1 tablet Oral Daily with lunch Bairagi, Dain B, MBBS        torsemide (DEMADEX) tablet 60 mg  60 mg Oral Daily Bairagi, Dain B, MBBS        Warfarin Dose Required Daily - Pharmacist Managed  1 each Oral See Admin Instructions Bairagi, Dain B, MBBS               Current Medications      Current Facility-Administered Medications:     acetaminophen (TYLENOL) tablet 1,000 mg, 1,000 mg, Oral, Q8H PRN, Bairagi, Dain B, MBBS    atorvastatin (LIPITOR) tablet 40 mg, 40 mg, Oral, QPM, Bairagi, Dain B, MBBS    Benzocaine (HURRICAINE/TOPEX) 20 % spray 0.5-1 mL, 1-2 spray, Mouth/Throat, Daily, Dain Marie MBBS    calcium carbonate (TUMS) chewable tablet 1,000 mg, 1,000 mg, Oral, 4x Daily PRN, Dain Mraie MBBS     glucose gel 15-30 g, 15-30 g, Oral, Q15 Min PRN **OR** dextrose 50 % injection 25-50 mL, 25-50 mL, Intravenous, Q15 Min PRN **OR** glucagon injection 1 mg, 1 mg, Subcutaneous, Q15 Min PRN, Zain Marieak B, MBBS    hydrALAZINE (APRESOLINE) tablet 10 mg, 10 mg, Oral, Q4H PRN **OR** hydrALAZINE (APRESOLINE) injection 10 mg, 10 mg, Intravenous, Q4H PRN, Zain Marieak B, MBBS    HYDROmorphone (DILAUDID) tablet 2 mg, 2 mg, Oral, Q4H PRN, Dain Marie, MBJOHAN    [START ON 7/29/2024] insulin aspart (NovoLOG) injection (RAPID ACTING), 1-7 Units, Subcutaneous, TID AC, Dain Marie, MBJOAHN    insulin aspart (NovoLOG) injection (RAPID ACTING), 1-5 Units, Subcutaneous, At Bedtime, Dain Marie, MBJOHAN    [START ON 7/29/2024] insulin aspart (NovoLOG) injection (RAPID ACTING), , Subcutaneous, TID w/meals, Dain Marie, MBJOHAN    [START ON 7/29/2024] insulin degludec (TRESIBA) 200 UNIT/ML injection 34 Units, 34 Units, Subcutaneous, QAM, Dain Marie B, MBBS    lidocaine (LMX4) cream, , Topical, Q1H PRN, Dain Marie, MBJOHAN    lidocaine 1 % 0.1-1 mL, 0.1-1 mL, Other, Q1H PRN, Dain Marie B, MBBS    meropenem (MERREM) 1 g vial to attach to  mL bag, 1 g, Intravenous, Q8H, Dain Marie, MBJOHAN    methadone (DOLOPHINE) tablet 5 mg, 5 mg, Oral, 4x Daily, Dain Marie, MBJOHAN    miconazole (MICATIN) 2 % powder, , Topical, BID, Dain Marie B, MBJOHAN    ondansetron (ZOFRAN ODT) ODT tab 4 mg, 4 mg, Oral, Q6H PRN, Dain Marie, MBJOHAN    ondansetron (ZOFRAN ODT) ODT tab 4 mg, 4 mg, Oral, Q6H PRN **OR** ondansetron (ZOFRAN) injection 4 mg, 4 mg, Intravenous, Q6H PRN, Dain Marie, MBBS    Patient is already receiving anticoagulation with heparin, enoxaparin (LOVENOX), warfarin (COUMADIN)  or other anticoagulant medication, , Does not apply, Continuous PRN, Dain Marie, MBBS    polyethylene glycol (MIRALAX) powder 17 g, 17 g, Oral, BID PRN, Dain Marie, ARLENE    potassium chloride ER  (K-TAB) TBCR 20 mEq, 20 mEq, Oral, BID, Dain Marie, ARLENE    senna-docusate (SENOKOT-S/PERICOLACE) 8.6-50 MG per tablet 1 tablet, 1 tablet, Oral, BID PRN, Dain Marie, ARLENE    sodium chloride (PF) 0.9% PF flush 3 mL, 3 mL, Intracatheter, Q8H, Dain Marie MBBS    sodium chloride (PF) 0.9% PF flush 3 mL, 3 mL, Intracatheter, q1 min prn, Dain Marie MBBS    [START ON 7/29/2024] Tab-A-Carey TABS 1 tablet, 1 tablet, Oral, Daily with lunch, Dain Marie MBBS    torsemide (DEMADEX) tablet 60 mg, 60 mg, Oral, Daily, Dain Marie MBBS    Warfarin Dose Required Daily - Pharmacist Managed, 1 each, Oral, See Admin Instructions, Dain Marie MBBS      Scheduled Meds:  Inpatient Administered Meds             Current Facility-Administered Medications   Medication Dose Route Frequency Provider Last Rate Last Admin    atorvastatin (LIPITOR) tablet 40 mg  40 mg Oral QPM Dain Marie MBBS        Benzocaine (HURRICAINE/TOPEX) 20 % spray 0.5-1 mL  1-2 spray Mouth/Throat Daily Dain Marie MBBS        [START ON 7/29/2024] insulin aspart (NovoLOG) injection (RAPID ACTING)  1-7 Units Subcutaneous TID AC Dain Marie MBBS        insulin aspart (NovoLOG) injection (RAPID ACTING)  1-5 Units Subcutaneous At Bedtime Dain Marie MBBS        [START ON 7/29/2024] insulin aspart (NovoLOG) injection (RAPID ACTING)   Subcutaneous TID w/meals Dain Marie MBBS        [START ON 7/29/2024] insulin degludec (TRESIBA) 200 UNIT/ML injection 34 Units  34 Units Subcutaneous QAM Dain Marie MBBS        meropenem (MERREM) 1 g vial to attach to  mL bag  1 g Intravenous Q8H Dain Marie MBBS        methadone (DOLOPHINE) tablet 5 mg  5 mg Oral 4x Daily Dain Marie MBBS        miconazole (MICATIN) 2 % powder   Topical BID Dain Marie MBBS        potassium chloride ER (K-TAB) TBCR 20 mEq  20 mEq Oral BID Dain Marie MBBS        sodium chloride (PF) 0.9%  PF flush 3 mL  3 mL Intracatheter Q8H Ingridi, Dain B, MBBS        [START ON 7/29/2024] Tab-A-Carey TABS 1 tablet  1 tablet Oral Daily with lunch Dain Marie B, MBBS        torsemide (DEMADEX) tablet 60 mg  60 mg Oral Daily Paularagi, Dain B, MBBS        Warfarin Dose Required Daily - Pharmacist Managed  1 each Oral See Admin Instructions Dain Marie B, MBBS             Continuous Infusions:  Current Facility-Administered Medications             Current Facility-Administered Medications   Medication Dose Route Frequency Provider Last Rate Last Admin    Patient is already receiving anticoagulation with heparin, enoxaparin (LOVENOX), warfarin (COUMADIN)  or other anticoagulant medication   Does not apply Continuous PRN Dain Marie B, MBBS             PRN Meds:.  Inpatient Meds PRN             Current Facility-Administered Medications   Medication Dose Route Frequency Provider Last Rate Last Admin    acetaminophen (TYLENOL) tablet 1,000 mg  1,000 mg Oral Q8H PRN Ingridi, Dain B, MBBS        calcium carbonate (TUMS) chewable tablet 1,000 mg  1,000 mg Oral 4x Daily PRN Baitayi, Dain B, MBBS        glucose gel 15-30 g  15-30 g Oral Q15 Min PRN Bairagi, Dain B, MBBS         Or    dextrose 50 % injection 25-50 mL  25-50 mL Intravenous Q15 Min PRN Baitayi, Dain B, MBBS         Or    glucagon injection 1 mg  1 mg Subcutaneous Q15 Min PRN Bairagi, Dain B, MBBS        hydrALAZINE (APRESOLINE) tablet 10 mg  10 mg Oral Q4H PRN Baitayi, Dain B, MBBS         Or    hydrALAZINE (APRESOLINE) injection 10 mg  10 mg Intravenous Q4H PRN Bairagi, Dain B, MBBS        HYDROmorphone (DILAUDID) tablet 2 mg  2 mg Oral Q4H PRN Ingridi, Dain B, MBBS        lidocaine (LMX4) cream   Topical Q1H PRN Baitayi, Dain B, MBBS        lidocaine 1 % 0.1-1 mL  0.1-1 mL Other Q1H PRN Bairagi, Dain B, MBBS        ondansetron (ZOFRAN ODT) ODT tab 4 mg  4 mg Oral Q6H PRN Baitayi, Dain B, MBBS        ondansetron (ZOFRAN ODT)  ODT tab 4 mg  4 mg Oral Q6H PRN Dain Marie MBBS         Or    ondansetron (ZOFRAN) injection 4 mg  4 mg Intravenous Q6H ALEXN Dain Marie MBBS        Patient is already receiving anticoagulation with heparin, enoxaparin (LOVENOX), warfarin (COUMADIN)  or other anticoagulant medication   Does not apply Continuous PRN Dain Marie MBBS        polyethylene glycol (MIRALAX) powder 17 g  17 g Oral BID PRN Dain Marie MBBS        senna-docusate (SENOKOT-S/PERICOLACE) 8.6-50 MG per tablet 1 tablet  1 tablet Oral BID Dain Del Valle MBBS        sodium chloride (PF) 0.9% PF flush 3 mL  3 mL Intracatheter q1 min Dain Del Valle MBBS                SOCIAL HISTORY:  Social History   Social History            Socioeconomic History    Marital status:        Spouse name: Not on file    Number of children: Not on file    Years of education: Not on file    Highest education level: Not on file   Occupational History    Not on file   Tobacco Use    Smoking status: Never    Smokeless tobacco: Never   Substance and Sexual Activity    Alcohol use: No    Drug use: No    Sexual activity: Never   Other Topics Concern    Not on file   Social History Narrative    Not on file      Social Determinants of Health           Financial Resource Strain: Medium Risk (3/7/2022)     [facility], [facility]     Financial Resource Strain      Difficulty of Paying Living Expenses: 1      Difficulty of Paying Living Expenses: 2   Food Insecurity: No Food Insecurity (3/7/2022)     [facility] & [facility]     Food Insecurity      Worried About Running Out of Food in the Last Year: 1   Transportation Needs: No Transportation Needs (3/7/2022)     [facility]      Transportation Needs      Lack of Transportation (Medical): 1   Physical Activity: Not on file   Stress: Not on file   Social Connections: Unknown (3/9/2023)     Received from [email or web address] [facility], [email or web address] [facility]     Social Connections      Frequency of Communication with Friends and Family: Not on file   Interpersonal Safety: Not on file   Housing Stability: Low Risk  (3/7/2022)     Received from [email or web address] [facility], [email or web address] [facility]     Housing Stability      Unable to Pay for Housing in the Last Year: 1            FAMILY HISTORY:  Family History         Family History   Problem Relation Age of Onset    Hypertension Mother      Heart Disease Father           heart attack and cardiomegaly    Diabetes Sister           type 2    Hypertension Sister      Respiratory Sister      Psychotic Disorder Sister           bipolar    Cancer Maternal Grandmother           throat    Cancer Paternal Grandmother           gastric           Medications:  Reviewed prior to admission meds as applicable in chart review.  Current meds are reviewed in the EMR listed MAR.     ANTIBIOTICS:    Current:   Prior:   Allergy to:    SH/FH and  travel history(if applicable to consult):    REVIEW OF SYSTEMS:  All other systems negative    EXAMINATION:  /63 (BP Location: Left arm)   Pulse 88   Temp 98.3  F (36.8  C) (Oral)   Resp 18   Wt 104.3 kg (230 lb)   SpO2 96%   BMI 37.12 kg/m    Alert, awake  Vitals tabulated above, reviewed  HEENT:  Neck supple without lymphadenopathy  Sclera clear  CARDIOVASCULAR regular rate and rhythm, no murmur  Lungs CLEAR TO AUSCULTATION   Abdomen soft, NT/ND, absent HEPATOSPLENOMEGALY  Skin normal  Joints normal  Neurologic exam non focal  Wound:  ALL PICTURES are MID JULY:                                CLINICAL DATABASE FOR---LAB/MICRO/CULTURES/IMAGING STUDIES:  Lab Results   Component Value Date  "   WBC 6.9 07/29/2024    WBC 9.4 02/22/2008     Lab Results   Component Value Date    RBC 3.13 07/29/2024    RBC 4.19 02/22/2008     Lab Results   Component Value Date    HGB 9.1 07/29/2024    HGB 12.6 02/22/2008     Lab Results   Component Value Date    HCT 28.8 07/29/2024    HCT 35.6 02/22/2008     No components found for: \"MCT\"  Lab Results   Component Value Date    MCV 92 07/29/2024    MCV 85 02/22/2008     Lab Results   Component Value Date    MCH 29.1 07/29/2024    MCH 30.1 02/22/2008     Lab Results   Component Value Date    MCHC 31.6 07/29/2024    MCHC 35.4 02/22/2008     Lab Results   Component Value Date    RDW 15.6 07/29/2024    RDW Test not performed 02/22/2008     Lab Results   Component Value Date     07/29/2024     02/22/2008       Last Comprehensive Metabolic Panel:  Sodium   Date Value Ref Range Status   07/29/2024 139 135 - 145 mmol/L Final   02/22/2008 140 133 - 144 mmol/L Final     Potassium   Date Value Ref Range Status   07/29/2024 3.7 3.4 - 5.3 mmol/L Final   06/28/2022 4.3 3.5 - 5.0 mmol/L Final   02/22/2008 4.5 3.4 - 5.3 mmol/L Final     Chloride   Date Value Ref Range Status   07/29/2024 98 98 - 107 mmol/L Final   06/28/2022 97 (L) 98 - 107 mmol/L Final   02/22/2008 100 94 - 109 mmol/L Final     Carbon Dioxide   Date Value Ref Range Status   02/22/2008 28 20 - 32 mmol/L Final     Carbon Dioxide (CO2)   Date Value Ref Range Status   07/29/2024 34 (H) 22 - 29 mmol/L Final   06/28/2022 29 22 - 31 mmol/L Final     Anion Gap   Date Value Ref Range Status   07/29/2024 7 7 - 15 mmol/L Final   06/28/2022 13 5 - 18 mmol/L Final   02/22/2008 11 6 - 17 mmol/L Final     Glucose   Date Value Ref Range Status   07/29/2024 113 (H) 70 - 99 mg/dL Final   06/28/2022 73 70 - 125 mg/dL Final   02/22/2008 298 (H) 60 - 99 mg/dL Final     GLUCOSE BY METER POCT   Date Value Ref Range Status   07/29/2024 105 (H) 70 - 99 mg/dL Final     Urea Nitrogen   Date Value Ref Range Status   07/29/2024 24.2 (H) " 8.0 - 23.0 mg/dL Final   06/28/2022 42 (H) 8 - 28 mg/dL Final   02/22/2008 15 7 - 30 mg/dL Final     Creatinine   Date Value Ref Range Status   07/29/2024 0.82 0.51 - 0.95 mg/dL Final   02/22/2008 0.82 0.60 - 1.30 mg/dL Final     GFR Estimate   Date Value Ref Range Status   07/29/2024 72 >60 mL/min/1.73m2 Final     Comment:     eGFR calculated using 2021 CKD-EPI equation.   02/22/2008 75 >60 mL/min/1.7m2 Final     Calcium   Date Value Ref Range Status   07/29/2024 9.2 8.8 - 10.4 mg/dL Final     Comment:     Reference intervals for this test were updated on 7/16/2024 to reflect our healthy population more accurately. There may be differences in the flagging of prior results with similar values performed with this method. Those prior results can be interpreted in the context of the updated reference intervals.   02/22/2008 10.4 8.5 - 10.4 mg/dL Final       Liver Function Studies -   Recent Labs   Lab Test 07/29/24  0732   PROTTOTAL 7.0   ALBUMIN 2.5*   BILITOTAL 0.3   ALKPHOS 94   AST 16   ALT 16       Sed Rate   Date Value Ref Range Status   03/28/2008 32 (H) 0 - 30 mm/h Final     Erythrocyte Sedimentation Rate   Date Value Ref Range Status   07/28/2024 91 (H) 0 - 30 mm/hr Final       CRP Inflammation   Date Value Ref Range Status   03/28/2008 16.0 (H) 0.0 - 8.0 mg/L Final         Culture 1+ Pseudomonas aeruginosa Abnormal       1+ Pseudomonas aeruginosa Abnormal       1+ Escherichia coli ESBL Abnormal       1+ Staphylococcus aureus MRSA Abnormal       1+ Enterococcus faecalis Abnormal       1+ Normal danny           Resulting Agency: [facility]     Susceptibility     Pseudomonas aeruginosa (1) Pseudomonas aeruginosa (2) Escherichia coli ESBL Staphylococcus aureus MRSA     ENDY ENDY ENDY ENDY     Amikacin     4 ug/mL Susceptible       Ampicillin     >=32 ug/mL Resistant       Ampicillin/ Sulbactam     >=32 ug/mL Resistant *       Cefepime <=1 ug/mL Susceptible 16 ug/mL Intermediate  Resistant       Cefotaxime      Resistant *        Cefoxitin     <=4 ug/mL Susceptible *       Ceftazidime 2 ug/mL Susceptible >=64 ug/mL Resistant  Resistant       Ceftriaxone      Resistant       Ciprofloxacin <=0.25 ug/mL Susceptible <=0.25 ug/mL Susceptible 1 ug/mL Resistant >=8 ug/mL Resistant *     Clindamycin       >=4 ug/mL Resistant     Daptomycin       1 ug/mL Susceptible     Doxycycline       <=0.5 ug/mL Susceptible     Erythromycin       >=8 ug/mL Resistant     Extended Spectrum Beta-Lactamase     Positive ug/mL ESBL Positive *       Gentamicin     >=16 ug/mL Resistant <=0.5 ug/mL Susceptible     Inducible macrolide resistance test       Negative ug/mL Negative *     Levofloxacin 0.5 ug/mL Susceptible 0.25 ug/mL Susceptible 1 ug/mL Intermediate >=8 ug/mL Resistant *     Linezolid       2 ug/mL Susceptible     Meropenem <=0.25 ug/mL Susceptible <=0.25 ug/mL Susceptible <=0.25 ug/mL Susceptible 1       Nitrofurantoin     32 ug/mL Susceptible * <=16 ug/mL Susceptible *     Oxacillin       >=4 ug/mL Resistant 2     Piperacillin/Tazobactam <=4 ug/mL Susceptible 96 ug/mL Resistant >=128 ug/mL Resistant       Rifampin       <=0.5 ug/mL Susceptible *     Tetracycline       <=1 ug/mL Susceptible     Tigecycline       <=0.12 ug/mL Susceptible *     Tobramycin <=1 ug/mL Susceptible <=1 ug/mL Susceptible >=16 ug/mL Resistant       Trimethoprim/Sulfamethoxazole     >16/304 ug/mL Resistant <=0.5/9.5 u... Susceptible     Vancomycin       1 ug/mL Susceptible                    * Suppressed Antibiotic   1 Enterobacterales that are susceptible to meropenem are usually susceptible to ertapenem.   2 Oxacillin susceptible isolates are susceptible to cephalosporins (example: cefazolin and cephalexin) and beta lactam combination agents. Oxacillin resistant isolates are resistant to these agents.       Susceptibility     Enterococcus faecalis     ENDY     Ampicillin <=2 ug/mL Susceptible     Ciprofloxacin 1 ug/mL Susceptible *     Daptomycin 2 ug/mL Susceptible *      Doxycycline <=0.5 ug/mL Susceptible *     Gentamicin Synergy Susceptible... Susceptible 1     Levofloxacin 2 ug/mL Susceptible *     Linezolid 2 ug/mL Susceptible *     Nitrofurantoin <=16 ug/mL Susceptible *     Streptomycin Synergy Susceptible... Susceptible *     Tigecycline <=0.12 ug/mL Susceptible *     Vancomycin 2 ug/mL Susceptible                          IMPRESSION:  Multiple SSTI wounds as in pictures above, previously not deep to bone  Micro as above, multiple danny including MRSA, ESBL    PLAN:  Imaging  Already on max abx  Will follow  D/W pt and Dax PARIKH in person      MARGO HERNANDEZ MD  [facility]  Office [phone]

## 2024-07-29 NOTE — PROGRESS NOTES
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     RLE wound infection  Pressure ulcer, coccyx   -Apparently getting worse since the discharge from [facility] (7/16/24).   -CRP, ESR elevated,  Normal procal  -WOC consulted  -ID following  -Pain control  -Continue Cont IV vancomycin and meropenem .  -MRI RLE ordered, pending.       Acute on chronic pain  -On methadone 5mg 4 times daily.   -Prn dilaudid also ordered.   -Consider pain team consult if pain control continues to be an issue     DM-II  -Uncontrolled with A1c of 9.9  -Cont home Tresiba, sliding scale insulin with meals/bedtime, 1:10 CHO ratio added.      Paroxysmal afib  -Rate controlled  -Cont coumadin, pharmacy to dose.      Other chronic issues  Hyperlipidemia  Hypertension  Chronic right sided heart failure  Chronic normocytic anemia  CKD-III  Untreated BERLIN  Obesity  Fibromyalgia  - Stable. Cont home meds          Diet: Combination Diet Regular Diet Adult; Moderate Consistent Carb (60 g CHO per Meal) Diet    DVT Prophylaxis: Warfarin  Braga Catheter: Not present  Lines: None     Cardiac Monitoring: None  Code Status: Full Code      Clinically Significant Risk Factors Present on Admission              # Hypoalbuminemia: Lowest albumin = 2.5 g/dL at 7/29/2024  7:32 AM, will monitor as appropriate  # Drug Induced Coagulation Defect: home medication list includes an anticoagulant medication    # Hypertension: Noted on problem list    # Anemia: based on hgb <11      # DMII: A1C = N/A within past 6 months    # Obesity: Estimated body mass index is 37.12 kg/m  as calculated from the following:    Height as of 7/24/24: 1.676 m (5'  "6\").    Weight as of this encounter: 104.3 kg (230 lb).       # Financial/Environmental Concerns:           Disposition Plan     Medically Ready for Discharge: Anticipated in 2-4 Days             Yaritza Verduzco MD  Hospitalist Service  [facility]  Securely message with [employer] (more info)  Text page via [email or web address] Paging/Directory   ______________________________________________________________________    Interval History   Patient is new to me. Chart reviewed and events noted.  Patient seen and examined.   Reports pain RLE, dressing soaked. No chest pain, SOB. No fever.       Physical Exam   Vital Signs: Temp: 98.3  F (36.8  C) Temp src: Oral BP: 131/63 Pulse: 88   Resp: 18 SpO2: 96 % O2 Device: None (Room air)    Weight: 230 lbs 0 oz    General: Pleasant, NAD  CVS:RRR, no edema  RS:CTAB  Abd: Soft, NT,ND  Neurology:Awake, alert, oriented  Ext:LLE amputation, RLE covered in soaked dressing.       Medical Decision Making       >50 MINUTES SPENT BY ME on the date of service doing chart review, history, exam, documentation & further activities per the note.  MANAGEMENT DISCUSSED with the following over the past 24 hours: Patient, ID, bedside RN, SW/CM       Data     I have personally reviewed the following data over the past 24 hrs:    6.9  \   9.1 (L)   / 217     139 98 24.2 (H) /  157 (H)   3.7 34 (H) 0.82 \     ALT: 16 AST: 16 AP: 94 TBILI: 0.3   ALB: 2.5 (L) TOT PROTEIN: 7.0 LIPASE: N/A     Procal: 0.11 CRP: 93.80 (H) Lactic Acid: N/A       INR:  2.99 (H) PTT:  N/A   D-dimer:  N/A Fibrinogen:  N/A       "

## 2024-07-29 NOTE — CONSULTS
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     RLE wound infection  Pressure ulcer, coccyx   -Apparently getting worse since the discharge from [facility] (7/16/24). Patient is very irritable and unco-operative during my visit, and didn't let me examine the wound. Patient stated \"the ED doc looked at it\"; and is very painful  -Check CRP, ESR and procal  -WOC consult  - Cont IV vancomycin and meropenem started in ED. Consult ID. Recent MRI on 7/13/24 was negative for ostomyelitis. MRI ordered from ED. Result is pending    Assessment:    Wound location: RLE     Last photo: 7/29/24 RLE anterolatera               RLE RLE posterior  Wound due to: Venous Ulcer  Wound history/plan of care: patient cannot tolerate compression due to significant pain which does not relent even after wraps had been in place  Wound base: 50%  shallow, non-viable tissue , 50%  clean, viable tissue  - will use autolytic debridement to hep remove necrotic tissue on calf area     Palpation of the wound bed: normal      Drainage: moderate     Description of drainage: serosanguinous     Measurements (length x width x depth, in cm): 22 cm x circumferential      Tunneling: N/A     Undermining: N/A  Periwound skin: Edematous      Color: pink      Temperature: normal   Odor: none  Pain: score 8/10 , constant and unbearable  Pain interventions prior to dressing change: oral Hydromorphone given, no IV available and slow and gentle cares   Treatment goal: Drainage control and Remove necrotic " tissue  STATUS: initial assessment  Supplies ordered: ordered Vashe, Exudry, Aquacel Ag    R buttock/posterior thigh seen this AM at same time as RLE.     Photos taken 7/29/24  Areas of friction or shear injury noted to R buttock and R posterior thigh each measuring approximately 1 cm x 1 cm with pink viable wound beds. Scant serous drainage.  Skin ridging from injury noted especially on upper most buttock wound.  Continue with Mepilex as started by nursing staff.    Treatment Plan:   RLE - Every other day (may do daily if exudate is draining through outer dressing)   Moisten wash cloth or towel with Vashe PS# [id number] and place over RLE from ankle to knee for 10 - 15 minutes.  Place Aquacel Ag PS# [id number] over all open areas.  Cut Exudry 24 x 36 PS# [id number]  in half and wrap around RLE - may use tape to secure in place or wrap roll gauze around the edge.  *When removing old dressing, ok to moisten Aquacel Ag with water or saline to ease removal of dressing.    R buttock/posterior thigh - Every 3 days  Cleanse with Vashe; gently dry.  Apply Mepilex dressing to open areas.    Orders: Written    RECOMMEND PRIMARY TEAM ORDER: None, at this time  Education provided: plan of care  Discussed plan of care with: Patient  WOC nurse follow-up plan: weekly  Notify WOC if wound(s) deteriorate.  Nursing to notify the Provider(s) and re-consult the WOC Nurse if new skin concern.    DATA:     Current support surface: Standard  Standard Isoflex gel  Containment of urine/stool: Incontinence Protocol and Suction based external urinary catheter   BMI: Body mass index is 37.12 kg/m .   Active diet order: Orders Placed This Encounter      Combination Diet Regular Diet Adult; Moderate Consistent Carb (60 g CHO per Meal) Diet     Output: I/O last 3 completed shifts:  In: -   Out: 1450 [Urine:1450]     Labs:   Recent Labs   Lab 07/29/24  0732   ALBUMIN 2.5*   HGB 9.1*   INR 2.99*   WBC 6.9     Pressure injury risk assessment:   Sensory  Perception: 3-->slightly limited  Moisture: 2-->very moist  Activity: 1-->bedfast  Mobility: 2-->very limited  Nutrition: 3-->adequate  Friction and Shear: 1-->problem  Suresh Score: 12    Nanci Mitchell MSN RN CWOCN  Pager no longer is use, please contact through [employer] group: [facility] [employer]

## 2024-07-30 ENCOUNTER — APPOINTMENT (OUTPATIENT)
Dept: MRI IMAGING | Facility: HOSPITAL | Age: 79
DRG: 603 | End: 2024-07-30
Attending: STUDENT IN AN ORGANIZED HEALTH CARE EDUCATION/TRAINING PROGRAM
Payer: COMMERCIAL

## 2024-07-30 LAB
CREAT SERPL-MCNC: 0.82 MG/DL (ref 0.51–0.95)
EGFRCR SERPLBLD CKD-EPI 2021: 72 ML/MIN/1.73M2
GLUCOSE BLDC GLUCOMTR-MCNC: 139 MG/DL (ref 70–99)
GLUCOSE BLDC GLUCOMTR-MCNC: 139 MG/DL (ref 70–99)
GLUCOSE BLDC GLUCOMTR-MCNC: 148 MG/DL (ref 70–99)
GLUCOSE BLDC GLUCOMTR-MCNC: 157 MG/DL (ref 70–99)
GLUCOSE BLDC GLUCOMTR-MCNC: 205 MG/DL (ref 70–99)
GLUCOSE BLDC GLUCOMTR-MCNC: 88 MG/DL (ref 70–99)
HOLD SPECIMEN: NORMAL
INR PPP: 1.92 (ref 0.85–1.15)
VANCOMYCIN SERPL-MCNC: 16.6 UG/ML

## 2024-07-30 PROCEDURE — 250N000011 HC RX IP 250 OP 636: Performed by: INTERNAL MEDICINE

## 2024-07-30 PROCEDURE — 255N000002 HC RX 255 OP 636: Performed by: HOSPITALIST

## 2024-07-30 PROCEDURE — 250N000011 HC RX IP 250 OP 636: Performed by: HOSPITALIST

## 2024-07-30 PROCEDURE — 73719 MRI LOWER EXTREMITY W/DYE: CPT | Mod: RT

## 2024-07-30 PROCEDURE — 85610 PROTHROMBIN TIME: CPT | Performed by: INTERNAL MEDICINE

## 2024-07-30 PROCEDURE — 99232 SBSQ HOSP IP/OBS MODERATE 35: CPT | Performed by: INTERNAL MEDICINE

## 2024-07-30 PROCEDURE — 250N000013 HC RX MED GY IP 250 OP 250 PS 637: Performed by: INTERNAL MEDICINE

## 2024-07-30 PROCEDURE — 80202 ASSAY OF VANCOMYCIN: CPT | Performed by: HOSPITALIST

## 2024-07-30 PROCEDURE — 36415 COLL VENOUS BLD VENIPUNCTURE: CPT | Performed by: INTERNAL MEDICINE

## 2024-07-30 PROCEDURE — A9585 GADOBUTROL INJECTION: HCPCS | Performed by: HOSPITALIST

## 2024-07-30 PROCEDURE — 99232 SBSQ HOSP IP/OBS MODERATE 35: CPT | Performed by: HOSPITALIST

## 2024-07-30 PROCEDURE — 82565 ASSAY OF CREATININE: CPT | Performed by: INTERNAL MEDICINE

## 2024-07-30 PROCEDURE — 120N000001 HC R&B MED SURG/OB

## 2024-07-30 PROCEDURE — 258N000003 HC RX IP 258 OP 636: Performed by: INTERNAL MEDICINE

## 2024-07-30 PROCEDURE — 250N000013 HC RX MED GY IP 250 OP 250 PS 637: Performed by: HOSPITALIST

## 2024-07-30 RX ORDER — LORAZEPAM 2 MG/ML
0.5 INJECTION INTRAMUSCULAR ONCE
Status: COMPLETED | OUTPATIENT
Start: 2024-07-30 | End: 2024-07-30

## 2024-07-30 RX ORDER — GADOBUTROL 604.72 MG/ML
10 INJECTION INTRAVENOUS ONCE
Status: COMPLETED | OUTPATIENT
Start: 2024-07-30 | End: 2024-07-30

## 2024-07-30 RX ADMIN — METHADONE HYDROCHLORIDE 5 MG: 5 TABLET ORAL at 17:47

## 2024-07-30 RX ADMIN — SODIUM CHLORIDE 1500 MG: 9 INJECTION, SOLUTION INTRAVENOUS at 19:06

## 2024-07-30 RX ADMIN — INSULIN ASPART: 100 INJECTION, SOLUTION INTRAVENOUS; SUBCUTANEOUS at 19:12

## 2024-07-30 RX ADMIN — METHADONE HYDROCHLORIDE 5 MG: 5 TABLET ORAL at 21:50

## 2024-07-30 RX ADMIN — WARFARIN SODIUM 3.5 MG: 3 TABLET ORAL at 17:52

## 2024-07-30 RX ADMIN — METHADONE HYDROCHLORIDE 5 MG: 5 TABLET ORAL at 08:12

## 2024-07-30 RX ADMIN — MICONAZOLE NITRATE ANTIFUNGAL POWDER 1 APPLICATOR: 2 POWDER TOPICAL at 08:14

## 2024-07-30 RX ADMIN — THERA TABS 1 TABLET: TAB at 12:34

## 2024-07-30 RX ADMIN — POTASSIUM CHLORIDE 20 MEQ: 750 TABLET, EXTENDED RELEASE ORAL at 08:12

## 2024-07-30 RX ADMIN — INSULIN ASPART 7 UNITS: 100 INJECTION, SOLUTION INTRAVENOUS; SUBCUTANEOUS at 12:34

## 2024-07-30 RX ADMIN — MEROPENEM 500 MG: 500 INJECTION, POWDER, FOR SOLUTION INTRAVENOUS at 08:06

## 2024-07-30 RX ADMIN — METHADONE HYDROCHLORIDE 5 MG: 5 TABLET ORAL at 12:35

## 2024-07-30 RX ADMIN — TORSEMIDE 60 MG: 20 TABLET ORAL at 08:11

## 2024-07-30 RX ADMIN — ATORVASTATIN CALCIUM 40 MG: 40 TABLET, FILM COATED ORAL at 21:50

## 2024-07-30 RX ADMIN — GADOBUTROL 10 ML: 604.72 INJECTION INTRAVENOUS at 17:04

## 2024-07-30 RX ADMIN — POTASSIUM CHLORIDE 20 MEQ: 750 TABLET, EXTENDED RELEASE ORAL at 21:50

## 2024-07-30 RX ADMIN — LORAZEPAM 0.5 MG: 2 INJECTION INTRAMUSCULAR; INTRAVENOUS at 15:40

## 2024-07-30 RX ADMIN — MEROPENEM 500 MG: 500 INJECTION, POWDER, FOR SOLUTION INTRAVENOUS at 17:47

## 2024-07-30 RX ADMIN — MEROPENEM 500 MG: 500 INJECTION, POWDER, FOR SOLUTION INTRAVENOUS at 00:55

## 2024-07-30 RX ADMIN — MICONAZOLE NITRATE ANTIFUNGAL POWDER: 2 POWDER TOPICAL at 21:49

## 2024-07-30 ASSESSMENT — ACTIVITIES OF DAILY LIVING (ADL)
ADLS_ACUITY_SCORE: 44
ADLS_ACUITY_SCORE: 45
DEPENDENT_IADLS:: CLEANING;COOKING;LAUNDRY;MEAL PREPARATION;SHOPPING;MEDICATION MANAGEMENT;MONEY MANAGEMENT;TRANSPORTATION
ADLS_ACUITY_SCORE: 45
ADLS_ACUITY_SCORE: 44
ADLS_ACUITY_SCORE: 45
ADLS_ACUITY_SCORE: 44
ADLS_ACUITY_SCORE: 45
ADLS_ACUITY_SCORE: 44
ADLS_ACUITY_SCORE: 45
ADLS_ACUITY_SCORE: 44
ADLS_ACUITY_SCORE: 44

## 2024-07-30 NOTE — PHARMACY-VANCOMYCIN DOSING SERVICE
Pharmacy Vancomycin Note  Date of Service 2024  Patient's  1945   79 year old, female    Indication: Skin and Soft Tissue Infection  Day of Therapy: 3  Current vancomycin regimen:  1500 mg IV q24h  Current vancomycin monitoring method: AUC  Current vancomycin therapeutic monitoring goal: 400-600 mg*h/L    Insight[employer] Prediction of Current Vancomycin Regimen  Loading dose: 2000 mg IV once 2024  Regimen: 1500 mg IV every 24 hours.  Start time: 17:52 on 2024  Exposure target: AUC24 (range)400-600 mg/L.hr   AUC24,ss: 509 mg/L.hr  Probability of AUC24 > 400: 95 %  Ctrough,ss: 14.8 mg/L  Probability of Ctrough,ss > 20: 10 %  Probability of nephrotoxicity (Lodise COSTA ): 10 %    Current estimated CrCl = Estimated Creatinine Clearance: 67.9 mL/min (based on SCr of 0.82 mg/dL).    Creatinine for last 3 days  2024:  3:27 PM Creatinine 0.90 mg/dL  2024:  7:32 AM Creatinine 0.82 mg/dL  2024:  9:48 AM Creatinine 0.82 mg/dL    Recent Vancomycin Levels (past 3 days)  2024:  9:21 AM Vancomycin 16.6 ug/mL    Vancomycin IV Administrations (past 72 hours)                     vancomycin (VANCOCIN) 1,500 mg in sodium chloride 0.9 % 250 mL intermittent infusion (mg) 1,500 mg New Bag 24 175    vancomycin (VANCOCIN) 2,000 mg in sodium chloride 0.9 % 500 mL intermittent infusion (mg) 2,000 mg New Bag 24 1734                    Nephrotoxins and other renal medications (From now, onward)      Start     Dose/Rate Route Frequency Ordered Stop    24 1700  vancomycin (VANCOCIN) 1,500 mg in sodium chloride 0.9 % 250 mL intermittent infusion         1,500 mg  over 90 Minutes Intravenous EVERY 24 HOURS 24 0900  torsemide (DEMADEX) tablet 60 mg        Note to Pharmacy: PTA Sig:Take 60 mg by mouth daily      60 mg Oral DAILY 24 1933                 Contrast Orders - past 72 hours (72h ago, onward)      None            Interpretation of levels and current  regimen:  Vancomycin level is reflective of -600    Has serum creatinine changed greater than 50% in last 72 hours: No    Urine output:  unable to determine    Renal Function: Stable    InsightRX Prediction of Planned New Vancomycin Regimen  Loading dose: 2000 mg IV once 7/28/2024  Regimen: 1500 mg IV every 24 hours.  Start time: 17:52 on 07/30/2024  Exposure target: AUC24 (range)400-600 mg/L.hr   AUC24,ss: 509 mg/L.hr  Probability of AUC24 > 400: 95 %  Ctrough,ss: 14.8 mg/L  Probability of Ctrough,ss > 20: 10 %  Probability of nephrotoxicity (Lodise COSTA 2009): 10 %    Plan:  Continue Current Dose  Vancomycin monitoring method: AUC  Vancomycin therapeutic monitoring goal: 400-600 mg*h/L  Pharmacy will check vancomycin levels as appropriate in 1-3 Days.  Serum creatinine levels will be ordered daily for the first week of therapy and at least twice weekly for subsequent weeks.    Terrie Ashley RPH

## 2024-07-30 NOTE — PROGRESS NOTES
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     RLE wound infection  Pressure ulcer, coccyx   -Apparently getting worse since the discharge from [facility] (7/16/24).   -CRP, ESR elevated,  Normal procal  -WOC consulted  -ID following  -Pain control  -Continue Cont IV vancomycin and meropenem .  -MRI RLE ordered, pending. Cancelled today as pt was very claustrophobic. Prn ativan ordered prior to attempting imaging later today       Acute on chronic pain  -On methadone 5mg 4 times daily.   -Prn dilaudid also ordered.   -Consider pain team consult if pain control continues to be an issue     DM-II  -Uncontrolled with A1c of 9.9  -Cont home Tresiba, sliding scale insulin with meals/bedtime, 1:10 CHO ratio added.      Paroxysmal afib  -Rate controlled  -Cont coumadin, pharmacy to dose.      Other chronic issues  Hyperlipidemia  Hypertension  Chronic right sided heart failure  Chronic normocytic anemia  CKD-III  Untreated BERLIN  Obesity  Fibromyalgia  - Stable. Cont home meds          Diet: Combination Diet Regular Diet Adult; Moderate Consistent Carb (60 g CHO per Meal) Diet    DVT Prophylaxis: Warfarin  Braga Catheter: Not present  Lines: None     Cardiac Monitoring: None  Code Status: Full Code      Clinically Significant Risk Factors              # Hypoalbuminemia: Lowest albumin = 2.5 g/dL at 7/29/2024  7:32 AM, will monitor as appropriate       # Hypertension: Noted on problem list           # DMII: A1C = N/A within past 6 months, PRESENT ON ADMISSION  # Obesity: Estimated body mass index is 37.12 kg/m  as calculated from the following:    Height as of 7/24/24: 1.676 m (5' 6\").    " Weight as of this encounter: 104.3 kg (230 lb)., PRESENT ON ADMISSION       # Financial/Environmental Concerns:           Disposition Plan     Medically Ready for Discharge: Anticipated in 2-4 Days             Yaritza Verduzco MD  Hospitalist Service  [facility]  Securely message with [employer] (more info)  Text page via [employer] Paging/Directory   ______________________________________________________________________    Interval History   Chart reviewed and events noted.  Patient seen and examined.   Returned from MRI, couldn't be done as pt was very claustrophobic. Pt very apologetic. Denies any pain, fever currently.      Physical Exam   Vital Signs: Temp: 98.3  F (36.8  C) Temp src: Oral BP: 113/53 Pulse: 79   Resp: 18 SpO2: 93 % O2 Device: None (Room air)    Weight: 230 lbs 0 oz    General: Pleasant female in NAD  CVS:RRR, no edema RLE.   RS:CTAB  Abd: Soft, NT,ND  Neurology:Awake, alert, oriented  Ext:LLE amputation, RLE covered in clean dressing      Medical Decision Making       >45 MINUTES SPENT BY ME on the date of service doing chart review, history, exam, documentation & further activities per the note.  MANAGEMENT DISCUSSED with the following over the past 24 hours: Patient, bedside RN, SW/CM       Data     I have personally reviewed the following data over the past 24 hrs:    N/A  \   N/A   / N/A     N/A N/A N/A /  139 (H)   N/A N/A 0.82 \     INR:  1.92 (H) PTT:  N/A   D-dimer:  N/A Fibrinogen:  N/A

## 2024-07-30 NOTE — PLAN OF CARE
"Pt is drowsy, & oriented x 4. Pt is agitated, refuses pain assessments & will not rate or describe pain, refuses having bed lowered, refused peripheral vascular assessment. Charge nurse notified about refusals.   - vitals stable  - pt prefers to have door open, lights on  - pt was upset after being called \"hun\", or \"my dear\"  - pt slept most of shift.               Problem: Risk for Delirium  Goal: Improved Behavioral Control  Outcome: Not Progressing  Intervention: Prevent and Manage Agitation  Recent Flowsheet Documentation  Taken 7/30/2024 0055 by Luz Mensah RN  Complementary Therapy: (declined) --  Environment Familiarity/Consistency:   daily routine followed   familiar objects from home provided   personal clothing/items utilized  Intervention: Minimize Safety Risk  Recent Flowsheet Documentation  Taken 7/30/2024 0055 by Luz Mensah RN  Communication Enhancement Strategies:   call light answered in person   extra time allowed for response  Enhanced Safety Measures: pain management     Problem: Skin Injury Risk Increased  Goal: Skin Health and Integrity  Outcome: Not Progressing  Intervention: Plan: Nurse Driven Intervention: Moisture Management  Recent Flowsheet Documentation  Taken 7/30/2024 0055 by Luz Mensah RN  Moisture Interventions:   Encourage regular toileting   Incontinence pad   Urinary collection device  Intervention: Plan: Nurse Driven Intervention: Friction and Shear  Recent Flowsheet Documentation  Taken 7/30/2024 0055 by Luz Mensah RN  Friction/Shear Interventions: Silicone foam sacral dressing  Intervention: Optimize Skin Protection  Recent Flowsheet Documentation  Taken 7/30/2024 0055 by Luz Mensah RN  Pressure Reduction Techniques: frequent weight shift encouraged  Pressure Reduction Devices:   positioning supports utilized   heel offloading device utilized  Skin Protection:   adhesive use limited   incontinence pads utilized  Activity Management:   " activity adjusted per tolerance   patient refuses activity  Head of Bed (HOB) Positioning: HOB at 20-30 degrees     Problem: Pain Acute  Goal: Optimal Pain Control and Function  Outcome: Not Progressing  Intervention: Develop Pain Management Plan  Recent Flowsheet Documentation  Taken 7/30/2024 0055 by Luz Mensah RN  Pain Management Interventions: medication (see MAR)  Intervention: Prevent or Manage Pain  Recent Flowsheet Documentation  Taken 7/30/2024 0055 by Luz Mensah RN  Sensory Stimulation Regulation:   television on   care clustered  Sleep/Rest Enhancement: awakenings minimized  Bowel Elimination Promotion: adequate fluid intake promoted  Complementary Therapy: (declined) --  Medication Review/Management: medications reviewed  Intervention: Optimize Psychosocial Wellbeing  Recent Flowsheet Documentation  Taken 7/30/2024 0055 by Luz Mensah RN  Supportive Measures:   active listening utilized   positive reinforcement provided   verbalization of feelings encouraged   Goal Outcome Evaluation:      Plan of Care Reviewed With: patient    Overall Patient Progress: improvingOverall Patient Progress: improving

## 2024-07-30 NOTE — PROGRESS NOTES
Spoke with MRI about time for imaging.  Was advised that they have no openings until this afternoon.  Reports they do have a completed, faxed MRI checklist.

## 2024-07-30 NOTE — CONSULTS
Care Management Initial Consult    General Information  Assessment completed with: Patient,    Type of CM/SW Visit: Initial Assessment    Primary Care Provider verified and updated as needed:     Readmission within the last 30 days:        Reason for Consult: discharge planning  Advance Care Planning:            Communication Assessment  Patient's communication style: spoken language (English or Bilingual)    Hearing Difficulty or Deaf: no   Wear Glasses or Blind: yes    Cognitive  Cognitive/Neuro/Behavioral: .WDL except, mood/behavior (irritabile)  Level of Consciousness: alert  Arousal Level: opens eyes spontaneously  Orientation: oriented x 4  Mood/Behavior: uncooperative     Speech: clear    Living Environment:   People in home: facility resident     Current living Arrangements: assisted living      Able to return to prior arrangements:         Family/Social Support:  Care provided by: child(angy)  Provides care for: no one, unable/limited ability to care for self                Description of Support System:           Current Resources:   Patient receiving home care services:       Community Resources:    Equipment currently used at home: grab bar, toilet, glucometer, raised toilet seat, walker, rolling  Supplies currently used at home:      Employment/Financial:  Employment Status:          Financial Concerns:             Does the patient's insurance plan have a 3 day qualifying hospital stay waiver?  No    Lifestyle & Psychosocial Needs:  Social Determinants of Health     Food Insecurity: No Food Insecurity (3/7/2022)    [facility]    Food Insecurity     Worried About Running Out of Food in the Last Year: 1   Depression: Not at risk (9/12/2023)    Received from [facility], [facility]    PHQ-2     PHQ-2 TOTAL SCORE: 1   Housing Stability: Low Risk   (3/7/2022)    [facility], [facility]    Housing Stability     Unable to Pay for Housing in the Last Year: 1   Tobacco Use: Low Risk  (7/24/2024)    Patient History     Smoking Tobacco Use: Never     Smokeless Tobacco Use: Never     Passive Exposure: Not on file   Financial Resource Strain: Medium Risk (3/7/2022)    [facility], [facility]    Financial Resource Strain     Difficulty of Paying Living Expenses: 1     Difficulty of Paying Living Expenses: 2   Alcohol Use: Not on file   Transportation Needs: No Transportation Needs (3/7/2022)    [facility], [facility]    Transportation Needs     Lack of Transportation (Medical): 1   Physical Activity: Not on file   Interpersonal Safety: Not on file   Stress: Not on file   Social Connections: Unknown (3/9/2023)    [facility], [facility]    Social Connections     Frequency of Communication with Friends and Family: Not on file   Health Literacy: Not on file       Functional Status:  Prior to admission patient needed assistance:   Dependent ADLs:: Wheelchair-independent, Bathing, Dressing  Dependent IADLs:: Cleaning, Cooking, Laundry, Meal Preparation, Shopping, Medication Management, Money Management, Transportation       Mental Health Status:  Mental Health Status: No Current Concerns       Chemical Dependency Status:  Chemical Dependency Status: No Current Concerns             Values/Beliefs:  Spiritual, Cultural Beliefs, [employer]ian Practices, Values that affect care:                 Additional Information:  Patient came to hospital from [facility]. Patient has bed hold. Patient lives at [facility]. Patient reports that she has a  wheelchair at baseline, but that it is not fit properly. She has been working with providers at [facility] to address this, however is still having issues. Patient reports that she gets assist with ADL's as needed and assistance with all IADL's.     CM following for care progression and discharge needs.     ROMULO Lucas

## 2024-07-30 NOTE — PROGRESS NOTES
"[facility] Progress Note    SUBJECTIVE:  grouchy but I have no real problems with her.  Going down to MRI.        REVIEW OF SYSTEMS:  Negative unless as listed above.  Social history, Family history, Medications: reviewed.    OBJECTIVE:  /53 (BP Location: Left arm)   Pulse 79   Temp 98.3  F (36.8  C) (Oral)   Resp 18   Wt 104.3 kg (230 lb)   SpO2 93%   BMI 37.12 kg/m                PHYSICAL EXAM:  Alert, awake  Vitals tabulated above, reviewed  Neck supple without lymphadenopathy  Sclera normal color, not injected  CARDIOVASCULAR regular rate and rhythm, no murmur  Lungs CLEAR TO AUSCULTATION   Abdomen soft, NT/ND, absent HEPATOSPLENOMEGALY  Skin normal  Joints normal  Neurologic exam non focal                  Antibiotics:    Pertinent labs:    Recent Labs   Lab Test 07/29/24  0732 07/28/24  1527 07/24/24  0530   WBC 6.9 8.2 9.2   HGB 9.1* 9.5* 10.6*   HCT 28.8* 28.8* 33.2*   MCV 92 91 93    235 301       Lab Results   Component Value Date    RBC 3.13 07/29/2024    RBC 4.19 02/22/2008     Lab Results   Component Value Date    HGB 9.1 07/29/2024    HGB 12.6 02/22/2008     Lab Results   Component Value Date    HCT 28.8 07/29/2024    HCT 35.6 02/22/2008     No components found for: \"MCT\"  Lab Results   Component Value Date    MCV 92 07/29/2024    MCV 85 02/22/2008     Lab Results   Component Value Date    MCH 29.1 07/29/2024    MCH 30.1 02/22/2008     Lab Results   Component Value Date    MCHC 31.6 07/29/2024    MCHC 35.4 02/22/2008     Lab Results   Component Value Date    RDW 15.6 07/29/2024    RDW Test not performed 02/22/2008     Lab Results   Component Value Date     07/29/2024     02/22/2008       Last Comprehensive Metabolic Panel:  Sodium   Date Value Ref Range Status   07/29/2024 139 135 - 145 mmol/L Final   02/22/2008 140 133 - 144 mmol/L Final     Potassium   Date Value Ref Range Status   07/29/2024 3.7 3.4 - 5.3 mmol/L Final   06/28/2022 4.3 3.5 - 5.0 " mmol/L Final   02/22/2008 4.5 3.4 - 5.3 mmol/L Final     Chloride   Date Value Ref Range Status   07/29/2024 98 98 - 107 mmol/L Final   06/28/2022 97 (L) 98 - 107 mmol/L Final   02/22/2008 100 94 - 109 mmol/L Final     Carbon Dioxide   Date Value Ref Range Status   02/22/2008 28 20 - 32 mmol/L Final     Carbon Dioxide (CO2)   Date Value Ref Range Status   07/29/2024 34 (H) 22 - 29 mmol/L Final   06/28/2022 29 22 - 31 mmol/L Final     Anion Gap   Date Value Ref Range Status   07/29/2024 7 7 - 15 mmol/L Final   06/28/2022 13 5 - 18 mmol/L Final   02/22/2008 11 6 - 17 mmol/L Final     Glucose   Date Value Ref Range Status   06/28/2022 73 70 - 125 mg/dL Final   02/22/2008 298 (H) 60 - 99 mg/dL Final     GLUCOSE BY METER POCT   Date Value Ref Range Status   07/30/2024 139 (H) 70 - 99 mg/dL Final     Urea Nitrogen   Date Value Ref Range Status   07/29/2024 24.2 (H) 8.0 - 23.0 mg/dL Final   06/28/2022 42 (H) 8 - 28 mg/dL Final   02/22/2008 15 7 - 30 mg/dL Final     Creatinine   Date Value Ref Range Status   07/29/2024 0.82 0.51 - 0.95 mg/dL Final   02/22/2008 0.82 0.60 - 1.30 mg/dL Final     GFR Estimate   Date Value Ref Range Status   07/29/2024 72 >60 mL/min/1.73m2 Final     Comment:     eGFR calculated using 2021 CKD-EPI equation.   02/22/2008 75 >60 mL/min/1.7m2 Final     Calcium   Date Value Ref Range Status   07/29/2024 9.2 8.8 - 10.4 mg/dL Final     Comment:     Reference intervals for this test were updated on 7/16/2024 to reflect our healthy population more accurately. There may be differences in the flagging of prior results with similar values performed with this method. Those prior results can be interpreted in the context of the updated reference intervals.   02/22/2008 10.4 8.5 - 10.4 mg/dL Final       Liver Function Studies -   Recent Labs   Lab Test 07/29/24  0732   PROTTOTAL 7.0   ALBUMIN 2.5*   BILITOTAL 0.3   ALKPHOS 94   AST 16   ALT 16       Sed Rate   Date Value Ref Range Status   03/28/2008 32 (H) 0  - 30 mm/h Final     Erythrocyte Sedimentation Rate   Date Value Ref Range Status   07/28/2024 91 (H) 0 - 30 mm/hr Final       CRP Inflammation   Date Value Ref Range Status   03/28/2008 16.0 (H) 0.0 - 8.0 mg/L Final               MICROBIOLOGY DATA:  reviewed      IMAGING/RADIOLOGY:       IMPRESSION:  Multiple SSTI wounds as in pictures above, previously not deep to bone  Micro as above, multiple danny including MRSA, ESBL     PLAN:  Imaging  Already on max abx  Will follow  With her noncompliance (even with really good compliance) this problem tends to never resolve, just will wax/wane over rest of her life.           MARGO Boyd MD  Office [phone] option 2 to desk staff

## 2024-07-30 NOTE — PLAN OF CARE
Problem: Adult Inpatient Plan of Care  Goal: Plan of Care Review  Description: The Plan of Care Review/Shift note should be completed every shift.  The Outcome Evaluation is a brief statement about your assessment that the patient is improving, declining, or no change.  This information will be displayed automatically on your shift  note.  Outcome: Progressing   Goal Outcome Evaluation:  Pt is alert and quite irritable-then will apologize for her behavior. VS are stable and she is on RA. Pt appears painful but has refused any pain meds other than usual methadone. Pt uncooperative with pain scale and assessments also. Dressing to right leg has small amt of sero sang drainage coming from posterior. Pt sent down to MRI at 1000-refused MRI due to extreme claustraphobia-no meds were ordered and pt had refused dilaudid when offered earlier. MRI has been rescheduled for 1630-wth ativan 0.5mg IV ordered  for 1545. Will recheck with MRI staff before giving. Pt aware of new time and ativan order.

## 2024-07-30 NOTE — PLAN OF CARE
Patient irritable at start of shift; mood improved; patient given time to express herself and active listening employed. Patient pain controlled with only scheduled medication. Patient declines (gets irritated with pain reassess.)to rate follow up pain assessments. Patient resistant to repositioning and stated she is repositioning herself. Continue to encourage significant shifts in position,  Problem: Adult Inpatient Plan of Care  Goal: Absence of Hospital-Acquired Illness or Injury  Intervention: Prevent Skin Injury  Recent Flowsheet Documentation  Taken 7/29/2024 1636 by Staci Sousa, RN  Body Position:   position changed independently   refuses positioning  Device Skin Pressure Protection:   tubing/devices free from skin contact   pressure points protected     Problem: Adult Inpatient Plan of Care  Goal: Optimal Comfort and Wellbeing  Outcome: Progressing     Problem: Comorbidity Management  Goal: Blood Glucose Levels Within Targeted Range  Outcome: Progressing  Intervention: Monitor and Manage Glycemia  Recent Flowsheet Documentation  Taken 7/29/2024 1636 by Staci Sousa, RN  Medication Review/Management: medications reviewed     Problem: Comorbidity Management  Goal: Blood Glucose Levels Within Targeted Range  Outcome: Progressing     Problem: Skin Injury Risk Increased  Goal: Skin Health and Integrity  Outcome: Not Progressing     Problem: Pain Acute  Goal: Optimal Pain Control and Function  Intervention: Prevent or Manage Pain  Recent Flowsheet Documentation  Taken 7/29/2024 1636 by Staci Sousa, RN  Medication Review/Management: medications reviewed   Goal Outcome Evaluation:

## 2024-07-31 ENCOUNTER — DOCUMENTATION ONLY (OUTPATIENT)
Dept: ANTICOAGULATION | Facility: CLINIC | Age: 79
End: 2024-07-31
Payer: COMMERCIAL

## 2024-07-31 LAB
CREAT SERPL-MCNC: 0.79 MG/DL (ref 0.51–0.95)
EGFRCR SERPLBLD CKD-EPI 2021: 76 ML/MIN/1.73M2
GLUCOSE BLDC GLUCOMTR-MCNC: 127 MG/DL (ref 70–99)
GLUCOSE BLDC GLUCOMTR-MCNC: 139 MG/DL (ref 70–99)
GLUCOSE BLDC GLUCOMTR-MCNC: 168 MG/DL (ref 70–99)
GLUCOSE BLDC GLUCOMTR-MCNC: 187 MG/DL (ref 70–99)
GLUCOSE BLDC GLUCOMTR-MCNC: 209 MG/DL (ref 70–99)
HOLD SPECIMEN: NORMAL
INR PPP: 1.95 (ref 0.85–1.15)

## 2024-07-31 PROCEDURE — 999N000248 HC STATISTIC IV INSERT WITH US BY RN

## 2024-07-31 PROCEDURE — 250N000013 HC RX MED GY IP 250 OP 250 PS 637: Performed by: HOSPITALIST

## 2024-07-31 PROCEDURE — 258N000003 HC RX IP 258 OP 636: Performed by: INTERNAL MEDICINE

## 2024-07-31 PROCEDURE — 99232 SBSQ HOSP IP/OBS MODERATE 35: CPT | Performed by: HOSPITALIST

## 2024-07-31 PROCEDURE — 250N000013 HC RX MED GY IP 250 OP 250 PS 637: Performed by: INTERNAL MEDICINE

## 2024-07-31 PROCEDURE — 120N000001 HC R&B MED SURG/OB

## 2024-07-31 PROCEDURE — 82565 ASSAY OF CREATININE: CPT | Performed by: INTERNAL MEDICINE

## 2024-07-31 PROCEDURE — 99232 SBSQ HOSP IP/OBS MODERATE 35: CPT | Performed by: INTERNAL MEDICINE

## 2024-07-31 PROCEDURE — 85610 PROTHROMBIN TIME: CPT | Performed by: INTERNAL MEDICINE

## 2024-07-31 PROCEDURE — 250N000011 HC RX IP 250 OP 636: Performed by: INTERNAL MEDICINE

## 2024-07-31 PROCEDURE — 36415 COLL VENOUS BLD VENIPUNCTURE: CPT | Performed by: INTERNAL MEDICINE

## 2024-07-31 RX ORDER — LEVOFLOXACIN 500 MG/1
500 TABLET, FILM COATED ORAL DAILY
Status: SHIPPED
Start: 2024-08-01 | End: 2024-08-07

## 2024-07-31 RX ORDER — DOXYCYCLINE 100 MG/1
100 CAPSULE ORAL 2 TIMES DAILY
Status: SHIPPED
Start: 2024-08-01 | End: 2024-08-07

## 2024-07-31 RX ADMIN — POTASSIUM CHLORIDE 20 MEQ: 750 TABLET, EXTENDED RELEASE ORAL at 08:15

## 2024-07-31 RX ADMIN — METHADONE HYDROCHLORIDE 5 MG: 5 TABLET ORAL at 08:15

## 2024-07-31 RX ADMIN — MEROPENEM 500 MG: 500 INJECTION, POWDER, FOR SOLUTION INTRAVENOUS at 08:24

## 2024-07-31 RX ADMIN — MICONAZOLE NITRATE ANTIFUNGAL POWDER: 2 POWDER TOPICAL at 20:21

## 2024-07-31 RX ADMIN — MEROPENEM 500 MG: 500 INJECTION, POWDER, FOR SOLUTION INTRAVENOUS at 23:45

## 2024-07-31 RX ADMIN — INSULIN ASPART 5 UNITS: 100 INJECTION, SOLUTION INTRAVENOUS; SUBCUTANEOUS at 08:19

## 2024-07-31 RX ADMIN — TORSEMIDE 60 MG: 20 TABLET ORAL at 08:15

## 2024-07-31 RX ADMIN — MEROPENEM 500 MG: 500 INJECTION, POWDER, FOR SOLUTION INTRAVENOUS at 01:20

## 2024-07-31 RX ADMIN — METHADONE HYDROCHLORIDE 5 MG: 5 TABLET ORAL at 16:23

## 2024-07-31 RX ADMIN — METHADONE HYDROCHLORIDE 5 MG: 5 TABLET ORAL at 20:21

## 2024-07-31 RX ADMIN — INSULIN ASPART 4 UNITS: 100 INJECTION, SOLUTION INTRAVENOUS; SUBCUTANEOUS at 16:44

## 2024-07-31 RX ADMIN — MEROPENEM 500 MG: 500 INJECTION, POWDER, FOR SOLUTION INTRAVENOUS at 16:24

## 2024-07-31 RX ADMIN — SODIUM CHLORIDE 1500 MG: 9 INJECTION, SOLUTION INTRAVENOUS at 17:31

## 2024-07-31 RX ADMIN — THERA TABS 1 TABLET: TAB at 12:03

## 2024-07-31 RX ADMIN — INSULIN ASPART 2 UNITS: 100 INJECTION, SOLUTION INTRAVENOUS; SUBCUTANEOUS at 12:04

## 2024-07-31 RX ADMIN — WARFARIN SODIUM 3.5 MG: 3 TABLET ORAL at 17:28

## 2024-07-31 RX ADMIN — ACETAMINOPHEN 1000 MG: 500 TABLET ORAL at 16:42

## 2024-07-31 RX ADMIN — METHADONE HYDROCHLORIDE 5 MG: 5 TABLET ORAL at 12:03

## 2024-07-31 RX ADMIN — MICONAZOLE NITRATE ANTIFUNGAL POWDER: 2 POWDER TOPICAL at 08:23

## 2024-07-31 RX ADMIN — ATORVASTATIN CALCIUM 40 MG: 40 TABLET, FILM COATED ORAL at 20:21

## 2024-07-31 RX ADMIN — POTASSIUM CHLORIDE 20 MEQ: 750 TABLET, EXTENDED RELEASE ORAL at 20:21

## 2024-07-31 RX ADMIN — INSULIN DEGLUDEC 34 UNITS: 100 INJECTION, SOLUTION SUBCUTANEOUS at 08:21

## 2024-07-31 ASSESSMENT — ACTIVITIES OF DAILY LIVING (ADL)
ADLS_ACUITY_SCORE: 44
ADLS_ACUITY_SCORE: 39
ADLS_ACUITY_SCORE: 39
ADLS_ACUITY_SCORE: 44
ADLS_ACUITY_SCORE: 39
ADLS_ACUITY_SCORE: 44
ADLS_ACUITY_SCORE: 44
ADLS_ACUITY_SCORE: 45
ADLS_ACUITY_SCORE: 45
ADLS_ACUITY_SCORE: 39
ADLS_ACUITY_SCORE: 44
ADLS_ACUITY_SCORE: 39
ADLS_ACUITY_SCORE: 44
ADLS_ACUITY_SCORE: 39
ADLS_ACUITY_SCORE: 45
ADLS_ACUITY_SCORE: 45
ADLS_ACUITY_SCORE: 44
ADLS_ACUITY_SCORE: 44
ADLS_ACUITY_SCORE: 39
ADLS_ACUITY_SCORE: 44
ADLS_ACUITY_SCORE: 39

## 2024-07-31 NOTE — PLAN OF CARE
Patient received premedication for MRI and she completed test. Patient pain controlled with scheduled oral medication. Patient shifts weight somewhat in bed and allowed reposition assist times one. Continue to educate  and encourage reposition assist and maintain skin integrity especially with incontinence.   Problem: Adult Inpatient Plan of Care  Goal: Absence of Hospital-Acquired Illness or Injury  Intervention: Prevent Skin Injury  Recent Flowsheet Documentation  Taken 7/30/2024 1600 by Staci Sousa, RN  Body Position: (refuses sidelying)   position changed independently   refuses positioning  Skin Protection: adhesive use limited  Device Skin Pressure Protection:   absorbent pad utilized/changed   positioning supports utilized     Problem: Adult Inpatient Plan of Care  Goal: Optimal Comfort and Wellbeing  Intervention: Monitor Pain and Promote Comfort  Recent Flowsheet Documentation  Taken 7/30/2024 1747 by Staci Sousa RN  Pain Management Interventions: medication (see MAR)     Problem: Skin Injury Risk Increased  Goal: Skin Health and Integrity  Intervention: Optimize Skin Protection  Recent Flowsheet Documentation  Taken 7/30/2024 1600 by Staci Sousa RN  Pressure Reduction Techniques: frequent weight shift encouraged  Pressure Reduction Devices: positioning supports utilized  Skin Protection: adhesive use limited  Activity Management:   activity adjusted per tolerance   activity encouraged   bedrest     Problem: Pain Acute  Goal: Optimal Pain Control and Function  Outcome: Progressing  Intervention: Develop Pain Management Plan  Recent Flowsheet Documentation  Taken 7/30/2024 1747 by Staci Sousa RN  Pain Management Interventions: medication (see MAR)  Intervention: Prevent or Manage Pain  Recent Flowsheet Documentation  Taken 7/30/2024 1600 by Staci Sousa, RN  Bowel Elimination Promotion: adequate fluid intake promoted  Medication Review/Management: medications  reviewed  Intervention: Optimize Psychosocial Wellbeing  Recent Flowsheet Documentation  Taken 7/30/2024 1600 by Staci Sousa, RN  Spiritual Activities Assistance:   affirmation provided   spiritual support provided  Supportive Measures:   active listening utilized   decision-making supported   positive reinforcement provided   relaxation techniques promoted   self-responsibility promoted   Goal Outcome Evaluation:

## 2024-07-31 NOTE — PROGRESS NOTES
"[facility] Progress Note    SUBJECTIVE:  Imaging as expected no osteo.       REVIEW OF SYSTEMS:  Negative unless as listed above.  Social history, Family history, Medications: reviewed.    OBJECTIVE:  /58 (BP Location: Left arm)   Pulse 101   Temp 98.7  F (37.1  C) (Oral)   Resp 20   Wt 104.3 kg (230 lb)   SpO2 96%   BMI 37.12 kg/m                PHYSICAL EXAM:  Alert, awake  Vitals tabulated above, reviewed  Neck supple without lymphadenopathy  Sclera normal color, not injected  CARDIOVASCULAR regular rate and rhythm, no murmur  Lungs CLEAR TO AUSCULTATION   Abdomen soft, NT/ND, absent HEPATOSPLENOMEGALY  Skin normal  Joints normal  Neurologic exam non focal                  Antibiotics:    Pertinent labs:    Recent Labs   Lab Test 07/29/24  0732 07/28/24  1527 07/24/24  0530   WBC 6.9 8.2 9.2   HGB 9.1* 9.5* 10.6*   HCT 28.8* 28.8* 33.2*   MCV 92 91 93    235 301       Lab Results   Component Value Date    RBC 3.13 07/29/2024    RBC 4.19 02/22/2008     Lab Results   Component Value Date    HGB 9.1 07/29/2024    HGB 12.6 02/22/2008     Lab Results   Component Value Date    HCT 28.8 07/29/2024    HCT 35.6 02/22/2008     No components found for: \"MCT\"  Lab Results   Component Value Date    MCV 92 07/29/2024    MCV 85 02/22/2008     Lab Results   Component Value Date    MCH 29.1 07/29/2024    MCH 30.1 02/22/2008     Lab Results   Component Value Date    MCHC 31.6 07/29/2024    MCHC 35.4 02/22/2008     Lab Results   Component Value Date    RDW 15.6 07/29/2024    RDW Test not performed 02/22/2008     Lab Results   Component Value Date     07/29/2024     02/22/2008       Last Comprehensive Metabolic Panel:  Sodium   Date Value Ref Range Status   07/29/2024 139 135 - 145 mmol/L Final   02/22/2008 140 133 - 144 mmol/L Final     Potassium   Date Value Ref Range Status   07/29/2024 3.7 3.4 - 5.3 mmol/L Final   06/28/2022 4.3 3.5 - 5.0 mmol/L Final   02/22/2008 4.5 3.4 - 5.3 " mmol/L Final     Chloride   Date Value Ref Range Status   07/29/2024 98 98 - 107 mmol/L Final   06/28/2022 97 (L) 98 - 107 mmol/L Final   02/22/2008 100 94 - 109 mmol/L Final     Carbon Dioxide   Date Value Ref Range Status   02/22/2008 28 20 - 32 mmol/L Final     Carbon Dioxide (CO2)   Date Value Ref Range Status   07/29/2024 34 (H) 22 - 29 mmol/L Final   06/28/2022 29 22 - 31 mmol/L Final     Anion Gap   Date Value Ref Range Status   07/29/2024 7 7 - 15 mmol/L Final   06/28/2022 13 5 - 18 mmol/L Final   02/22/2008 11 6 - 17 mmol/L Final     Glucose   Date Value Ref Range Status   06/28/2022 73 70 - 125 mg/dL Final   02/22/2008 298 (H) 60 - 99 mg/dL Final     GLUCOSE BY METER POCT   Date Value Ref Range Status   07/31/2024 127 (H) 70 - 99 mg/dL Final     Urea Nitrogen   Date Value Ref Range Status   07/29/2024 24.2 (H) 8.0 - 23.0 mg/dL Final   06/28/2022 42 (H) 8 - 28 mg/dL Final   02/22/2008 15 7 - 30 mg/dL Final     Creatinine   Date Value Ref Range Status   07/31/2024 0.79 0.51 - 0.95 mg/dL Final   02/22/2008 0.82 0.60 - 1.30 mg/dL Final     GFR Estimate   Date Value Ref Range Status   07/31/2024 76 >60 mL/min/1.73m2 Final     Comment:     eGFR calculated using 2021 CKD-EPI equation.   02/22/2008 75 >60 mL/min/1.7m2 Final     Calcium   Date Value Ref Range Status   07/29/2024 9.2 8.8 - 10.4 mg/dL Final     Comment:     Reference intervals for this test were updated on 7/16/2024 to reflect our healthy population more accurately. There may be differences in the flagging of prior results with similar values performed with this method. Those prior results can be interpreted in the context of the updated reference intervals.   02/22/2008 10.4 8.5 - 10.4 mg/dL Final       Liver Function Studies -   Recent Labs   Lab Test 07/29/24  0732   PROTTOTAL 7.0   ALBUMIN 2.5*   BILITOTAL 0.3   ALKPHOS 94   AST 16   ALT 16       Sed Rate   Date Value Ref Range Status   03/28/2008 32 (H) 0 - 30 mm/h Final     Erythrocyte  Sedimentation Rate   Date Value Ref Range Status   07/28/2024 91 (H) 0 - 30 mm/hr Final       CRP Inflammation   Date Value Ref Range Status   03/28/2008 16.0 (H) 0.0 - 8.0 mg/L Final               MICROBIOLOGY DATA:  reviewed      IMAGING/RADIOLOGY:    1.  Moderate to severe multifocal right leg subcutaneous edema mainly mid to distal with skin thickening and enhancement compatible with cellulitis. Reported wound is less conspicuous on MRI. Correlate clinically.  2.  No well-defined drainable subcutaneous fluid collection to suggest abscess.  3.  No MR finding for right tibia or fibula acute osteomyelitis.     IMPRESSION:  Multiple SSTI wounds as in pictures above, previously not deep to bone  Micro as above, multiple danny including MRSA, ESBL     PLAN:  Imaging done, reviewed.   Already on max abx  Will follow  With her noncompliance (even with really good compliance) this problem tends to never resolve, just will wax/wane over rest of her life.    When able to go 5 days levaquin 500 daily and doxy 100 BID.  Abx are not the answer to this, wound care is.  Need receiving care center to know weeping wounds are NOT a reason to send her back here.      Sign off thanks           MARGO Boyd MD  Office [phone] option 2 to desk staff

## 2024-07-31 NOTE — PROGRESS NOTES
ANTICOAGULATION     Linda Loredo is overdue for an INR check.     Patient is currently inpatient at [facility] as of 7/28/24. Patient reminder will be postponed one week and will continue to assess for discharge plan.    Lisa Baez RN

## 2024-07-31 NOTE — PROGRESS NOTES
"Care Management Follow Up    Length of Stay (days): 3    Expected Discharge Date: 08/01/2024    Anticipated Discharge Plan:   TCU     Transportation: TBD    PT Recommendations:  No therapy recs, TCU needs this   OT Recommendations:   No therapy recs, TCU needs this.      Barriers to Discharge: medical stability/     Prior Living Situation: \"Patient has bed hold at [facility]. Lives at [facility]. Wheelchair user at baseline. Gets assist with ADL's as needed, full assist with IADL's.\"       Patient/Spokesperson Updated: No    Additional Information:  Chart reviewed.     Cm updates:  Hosp says pt should be med ready tomorrow.         RNCM spoke to [facility] this afternoon admissions, family got rid of bed hold today. No beds avail and said they cannot take pt back due to noncompliance.         Cm will continue to follow plan of care,review recommendations, and assist with any discharge needs anticipated.       Lyndsey Gillis RN      "

## 2024-07-31 NOTE — PROGRESS NOTES
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     RLE wound infection  Pressure ulcer, coccyx   -Apparently getting worse since the discharge from [facility] (7/16/24).   -CRP, ESR elevated,  Normal procal  -MRI RLE neg for osteomyelitis  -WOC following  -ID following  -Pain control  -Continue Cont IV vancomycin and meropenem , on discharge 5 days of levaquin and doxy.        Acute on chronic pain  -On methadone 5mg 4 times daily.   -Prn dilaudid also ordered.      DM-II  -Uncontrolled with A1c of 9.9  -Cont home Tresiba, sliding scale insulin with meals/bedtime, 1:10 CHO ratio added.      Paroxysmal afib  -Rate controlled  -Cont coumadin, pharmacy to dose.      Other chronic issues  Hyperlipidemia  Hypertension  Chronic right sided heart failure  Chronic normocytic anemia  CKD-III  Untreated BERLIN  Obesity  Fibromyalgia  - Stable. Cont home meds          Diet: Combination Diet Regular Diet Adult; Moderate Consistent Carb (60 g CHO per Meal) Diet    DVT Prophylaxis: Warfarin  Braga Catheter: Not present  Lines: None     Cardiac Monitoring: None  Code Status: Full Code      Clinically Significant Risk Factors              # Hypoalbuminemia: Lowest albumin = 2.5 g/dL at 7/29/2024  7:32 AM, will monitor as appropriate       # Hypertension: Noted on problem list           # DMII: A1C = N/A within past 6 months, PRESENT ON ADMISSION  # Obesity: Estimated body mass index is 37.12 kg/m  as calculated from the following:    Height as of 7/24/24: 1.676 m (5' 6\").    Weight as of this encounter: 104.3 kg (230 lb)., PRESENT ON ADMISSION       # Financial/Environmental Concerns:           Disposition " Plan     Medically Ready for Discharge: Tomorrow             Yaritza Verduzco MD  Hospitalist Service  [facility]  Securely message with [employer] (more info)  Text page via [employer] Paging/Directory   ______________________________________________________________________    Interval History   Chart reviewed and events noted.  Patient seen and examined.   In pain, but ok overall. No fever, chest pain. D/w pt and her daughter about MRI findings.      Physical Exam   Vital Signs: Temp: 98.7  F (37.1  C) Temp src: Oral BP: 111/58 Pulse: 101   Resp: 20 SpO2: 96 % O2 Device: None (Room air)    Weight: 230 lbs 0 oz    General: Pleasant female in NAD  CVS:RRR  RS:CTAB  Neurology:Awake, alert, oriented  Ext:LLE amputation, RLE covered in clean dressing      Medical Decision Making       >45 MINUTES SPENT BY ME on the date of service doing chart review, history, exam, documentation & further activities per the note.  MANAGEMENT DISCUSSED with the following over the past 24 hours: Patient, bedside RN, SW/CM       Data     I have personally reviewed the following data over the past 24 hrs:    N/A  \   N/A   / N/A     N/A N/A N/A /  127 (H)   N/A N/A 0.79 \     INR:  1.95 (H) PTT:  N/A   D-dimer:  N/A Fibrinogen:  N/A

## 2024-07-31 NOTE — PLAN OF CARE
"Pt lethargic but oriented x 4.   - Agitated, frustrated, hostile, and uncooperative.   - vitals stable  - refused positioning other than pillows under heels/knees  - buttocks wound protected  - continues to refuse pain assessment, will not rate or describe pain, declined medication for pain    - pt was very unpleasant, hostile, argumentative, rude and mean. RNs Patience was maintained.           Problem: Adult Inpatient Plan of Care  Goal: Plan of Care Review  Description: The Plan of Care Review/Shift note should be completed every shift.  The Outcome Evaluation is a brief statement about your assessment that the patient is improving, declining, or no change.  This information will be displayed automatically on your shift  note.  Outcome: Progressing  Flowsheets (Taken 7/31/2024 0443)  Plan of Care Reviewed With: patient  Overall Patient Progress: improving  Goal: Patient-Specific Goal (Individualized)  Description: You can add care plan individualizations to a care plan. Examples of Individualization might be:  \"Parent requests to be called daily at 9am for status\", \"I have a hard time hearing out of my right ear\", or \"Do not touch me to wake me up as it startles  me\".  Outcome: Progressing  Goal: Absence of Hospital-Acquired Illness or Injury  Outcome: Progressing  Intervention: Identify and Manage Fall Risk  Recent Flowsheet Documentation  Taken 7/31/2024 0130 by Luz Mensah RN  Safety Promotion/Fall Prevention:   safety round/check completed   activity supervised   patient and family education   nonskid shoes/slippers when out of bed   assistive device/personal items within reach   room door open   lighting adjusted   clutter free environment maintained  Intervention: Prevent Skin Injury  Recent Flowsheet Documentation  Taken 7/31/2024 0130 by Luz Mensah RN  Body Position: position changed independently  Skin Protection:   adhesive use limited   incontinence pads utilized  Device Skin Pressure " Protection:   adhesive use limited   positioning supports utilized  Intervention: Prevent and Manage VTE (Venous Thromboembolism) Risk  Recent Flowsheet Documentation  Taken 7/31/2024 0130 by Luz Mensah RN  VTE Prevention/Management: patient refused intervention  Intervention: Prevent Infection  Recent Flowsheet Documentation  Taken 7/31/2024 0130 by Luz Mensah RN  Infection Prevention:   hand hygiene promoted   personal protective equipment utilized   rest/sleep promoted   single patient room provided   equipment surfaces disinfected  Goal: Optimal Comfort and Wellbeing  Outcome: Progressing  Intervention: Monitor Pain and Promote Comfort  Recent Flowsheet Documentation  Taken 7/31/2024 0130 by Luz Mensah RN  Pain Management Interventions: medication (see MAR)  Goal: Readiness for Transition of Care  Outcome: Progressing     Problem: Risk for Delirium  Goal: Optimal Coping  Outcome: Progressing  Intervention: Optimize Psychosocial Adjustment to Delirium  Recent Flowsheet Documentation  Taken 7/31/2024 0130 by Luz Mensah RN  Supportive Measures:   active listening utilized   positive reinforcement provided   verbalization of feelings encouraged  Goal: Improved Behavioral Control  Outcome: Progressing  Intervention: Prevent and Manage Agitation  Recent Flowsheet Documentation  Taken 7/31/2024 0130 by Luz Mensah RN  Complementary Therapy: (declined) --  Environment Familiarity/Consistency:   daily routine followed   familiar objects from home provided   personal clothing/items utilized  Intervention: Minimize Safety Risk  Recent Flowsheet Documentation  Taken 7/31/2024 0130 by Luz Mensah RN  Communication Enhancement Strategies:   call light answered in person   extra time allowed for response  Enhanced Safety Measures: pain management  Goal: Improved Attention and Thought Clarity  Outcome: Progressing  Intervention: Maximize Cognitive Function  Recent Flowsheet  Documentation  Taken 7/31/2024 0130 by Luz Mensah RN  Sensory Stimulation Regulation:   television on   care clustered  Reorientation Measures:   calendar in view   clock in view  Goal: Improved Sleep  Outcome: Progressing  Intervention: Promote Sleep  Recent Flowsheet Documentation  Taken 7/31/2024 0130 by Luz Mensah RN  Sleep/Rest Enhancement: awakenings minimized     Problem: Comorbidity Management  Goal: Blood Glucose Levels Within Targeted Range  Outcome: Progressing  Intervention: Monitor and Manage Glycemia  Recent Flowsheet Documentation  Taken 7/31/2024 0130 by Luz Mensah RN  Glycemic Management: blood glucose monitored  Medication Review/Management: medications reviewed  Goal: Blood Pressure in Desired Range  Outcome: Progressing  Intervention: Maintain Blood Pressure Management  Recent Flowsheet Documentation  Taken 7/31/2024 0130 by Luz Mensah RN  Medication Review/Management: medications reviewed     Problem: Skin or Soft Tissue Infection  Goal: Absence of Infection Signs and Symptoms  Outcome: Progressing  Intervention: Minimize and Manage Infection Progression  Recent Flowsheet Documentation  Taken 7/31/2024 0130 by Luz Mensah RN  Infection Prevention:   hand hygiene promoted   personal protective equipment utilized   rest/sleep promoted   single patient room provided   equipment surfaces disinfected  Isolation Precautions: contact precautions maintained     Problem: Skin Injury Risk Increased  Goal: Skin Health and Integrity  Outcome: Progressing  Intervention: Plan: Nurse Driven Intervention: Moisture Management  Recent Flowsheet Documentation  Taken 7/31/2024 0130 by Luz Mensah RN  Moisture Interventions:   Urinary collection device   Incontinence pad   Encourage regular toileting  Intervention: Plan: Nurse Driven Intervention: Friction and Shear  Recent Flowsheet Documentation  Taken 7/31/2024 0130 by Luz Mensah RN  Friction/Shear  Interventions: Silicone foam sacral dressing  Intervention: Optimize Skin Protection  Recent Flowsheet Documentation  Taken 7/31/2024 0130 by Luz Mensah RN  Pressure Reduction Techniques: frequent weight shift encouraged  Pressure Reduction Devices:   positioning supports utilized   heel offloading device utilized  Skin Protection:   adhesive use limited   incontinence pads utilized  Activity Management:   activity adjusted per tolerance   patient refuses activity  Head of Bed (HOB) Positioning: HOB at 20-30 degrees     Problem: Dysrhythmia  Goal: Normalized Cardiac Rhythm  Outcome: Progressing  Intervention: Monitor and Manage Cardiac Rhythm Effect  Recent Flowsheet Documentation  Taken 7/31/2024 0130 by Luz Mensah RN  VTE Prevention/Management: patient refused intervention     Problem: Pain Acute  Goal: Optimal Pain Control and Function  Outcome: Progressing  Intervention: Develop Pain Management Plan  Recent Flowsheet Documentation  Taken 7/31/2024 0130 by Luz Mensah RN  Pain Management Interventions: medication (see MAR)  Intervention: Prevent or Manage Pain  Recent Flowsheet Documentation  Taken 7/31/2024 0130 by Luz Mensah RN  Sensory Stimulation Regulation:   television on   care clustered  Sleep/Rest Enhancement: awakenings minimized  Bowel Elimination Promotion: adequate fluid intake promoted  Complementary Therapy: (declined) --  Medication Review/Management: medications reviewed  Intervention: Optimize Psychosocial Wellbeing  Recent Flowsheet Documentation  Taken 7/31/2024 0130 by Luz Mensah RN  Supportive Measures:   active listening utilized   positive reinforcement provided   verbalization of feelings encouraged   Goal Outcome Evaluation:      Plan of Care Reviewed With: patient    Overall Patient Progress: improvingOverall Patient Progress: improving

## 2024-07-31 NOTE — PLAN OF CARE
"  Problem: Adult Inpatient Plan of Care  Goal: Plan of Care Review  Description: The Plan of Care Review/Shift note should be completed every shift.  The Outcome Evaluation is a brief statement about your assessment that the patient is improving, declining, or no change.  This information will be displayed automatically on your shift  note.  Outcome: Progressing     Problem: Adult Inpatient Plan of Care  Goal: Patient-Specific Goal (Individualized)  Description: You can add care plan individualizations to a care plan. Examples of Individualization might be:  \"Parent requests to be called daily at 9am for status\", \"I have a hard time hearing out of my right ear\", or \"Do not touch me to wake me up as it startles  me\".  Outcome: Progressing     Problem: Adult Inpatient Plan of Care  Goal: Absence of Hospital-Acquired Illness or Injury  Intervention: Identify and Manage Fall Risk  Recent Flowsheet Documentation  Taken 7/31/2024 0800 by Milo Canela, RN  Safety Promotion/Fall Prevention: safety round/check completed     Problem: Adult Inpatient Plan of Care  Goal: Absence of Hospital-Acquired Illness or Injury  Intervention: Prevent Skin Injury  Recent Flowsheet Documentation  Taken 7/31/2024 1221 by Milo Canela, RN  Body Position: sitting up in bed  Taken 7/31/2024 1021 by Milo Canela, RN  Body Position: sitting up in bed  Taken 7/31/2024 0800 by Milo Canela, RN  Body Position: log-rolled  Skin Protection: adhesive use limited  Device Skin Pressure Protection: absorbent pad utilized/changed   Goal Outcome Evaluation:       Patient alert oriented x 4, able to verbalize needs, received scheduled methadone for leg wound change. Patient continues to yell at staff, argumentative, demanding. Puresick was dislodged and leaked in the bed. 2 staff answered call light and went to change the bed but patient refused to allow one of the staff members and wanted to wait till  her scheduled NA came back from break. " Nurse educated the patient of not changing the bed right away, but patient still wanted to wait for her scheduled aid.

## 2024-08-01 ENCOUNTER — APPOINTMENT (OUTPATIENT)
Dept: OCCUPATIONAL THERAPY | Facility: HOSPITAL | Age: 79
DRG: 603 | End: 2024-08-01
Attending: HOSPITALIST
Payer: COMMERCIAL

## 2024-08-01 ENCOUNTER — APPOINTMENT (OUTPATIENT)
Dept: PHYSICAL THERAPY | Facility: HOSPITAL | Age: 79
DRG: 603 | End: 2024-08-01
Attending: HOSPITALIST
Payer: COMMERCIAL

## 2024-08-01 LAB
CREAT SERPL-MCNC: 0.81 MG/DL (ref 0.51–0.95)
EGFRCR SERPLBLD CKD-EPI 2021: 73 ML/MIN/1.73M2
GLUCOSE BLDC GLUCOMTR-MCNC: 106 MG/DL (ref 70–99)
GLUCOSE BLDC GLUCOMTR-MCNC: 182 MG/DL (ref 70–99)
GLUCOSE BLDC GLUCOMTR-MCNC: 200 MG/DL (ref 70–99)
GLUCOSE BLDC GLUCOMTR-MCNC: 205 MG/DL (ref 70–99)
GLUCOSE BLDC GLUCOMTR-MCNC: 70 MG/DL (ref 70–99)
HOLD SPECIMEN: NORMAL
INR PPP: 2.23 (ref 0.85–1.15)

## 2024-08-01 PROCEDURE — 36415 COLL VENOUS BLD VENIPUNCTURE: CPT | Performed by: INTERNAL MEDICINE

## 2024-08-01 PROCEDURE — 250N000013 HC RX MED GY IP 250 OP 250 PS 637: Performed by: INTERNAL MEDICINE

## 2024-08-01 PROCEDURE — 97165 OT EVAL LOW COMPLEX 30 MIN: CPT | Mod: GO

## 2024-08-01 PROCEDURE — 97535 SELF CARE MNGMENT TRAINING: CPT | Mod: GO

## 2024-08-01 PROCEDURE — 120N000001 HC R&B MED SURG/OB

## 2024-08-01 PROCEDURE — 250N000013 HC RX MED GY IP 250 OP 250 PS 637: Performed by: HOSPITALIST

## 2024-08-01 PROCEDURE — 82565 ASSAY OF CREATININE: CPT | Performed by: INTERNAL MEDICINE

## 2024-08-01 PROCEDURE — 85610 PROTHROMBIN TIME: CPT | Performed by: INTERNAL MEDICINE

## 2024-08-01 PROCEDURE — 97161 PT EVAL LOW COMPLEX 20 MIN: CPT | Mod: GP

## 2024-08-01 PROCEDURE — 258N000003 HC RX IP 258 OP 636: Performed by: INTERNAL MEDICINE

## 2024-08-01 PROCEDURE — 99232 SBSQ HOSP IP/OBS MODERATE 35: CPT | Performed by: HOSPITALIST

## 2024-08-01 PROCEDURE — 250N000011 HC RX IP 250 OP 636: Performed by: INTERNAL MEDICINE

## 2024-08-01 RX ORDER — WARFARIN SODIUM 3 MG/1
3 TABLET ORAL
Status: COMPLETED | OUTPATIENT
Start: 2024-08-01 | End: 2024-08-01

## 2024-08-01 RX ORDER — DOXYCYCLINE 100 MG/1
100 CAPSULE ORAL EVERY 12 HOURS SCHEDULED
Status: DISCONTINUED | OUTPATIENT
Start: 2024-08-02 | End: 2024-08-07 | Stop reason: HOSPADM

## 2024-08-01 RX ORDER — LEVOFLOXACIN 500 MG/1
500 TABLET, FILM COATED ORAL DAILY
Status: DISCONTINUED | OUTPATIENT
Start: 2024-08-02 | End: 2024-08-07 | Stop reason: HOSPADM

## 2024-08-01 RX ADMIN — METHADONE HYDROCHLORIDE 5 MG: 5 TABLET ORAL at 17:03

## 2024-08-01 RX ADMIN — INSULIN ASPART 6 UNITS: 100 INJECTION, SOLUTION INTRAVENOUS; SUBCUTANEOUS at 13:22

## 2024-08-01 RX ADMIN — INSULIN ASPART 4 UNITS: 100 INJECTION, SOLUTION INTRAVENOUS; SUBCUTANEOUS at 18:50

## 2024-08-01 RX ADMIN — INSULIN ASPART 4 UNITS: 100 INJECTION, SOLUTION INTRAVENOUS; SUBCUTANEOUS at 09:25

## 2024-08-01 RX ADMIN — TORSEMIDE 60 MG: 20 TABLET ORAL at 09:28

## 2024-08-01 RX ADMIN — ATORVASTATIN CALCIUM 40 MG: 40 TABLET, FILM COATED ORAL at 20:33

## 2024-08-01 RX ADMIN — POTASSIUM CHLORIDE 20 MEQ: 750 TABLET, EXTENDED RELEASE ORAL at 20:33

## 2024-08-01 RX ADMIN — MICONAZOLE NITRATE ANTIFUNGAL POWDER: 2 POWDER TOPICAL at 20:33

## 2024-08-01 RX ADMIN — SODIUM CHLORIDE 1500 MG: 9 INJECTION, SOLUTION INTRAVENOUS at 18:46

## 2024-08-01 RX ADMIN — THERA TABS 1 TABLET: TAB at 13:23

## 2024-08-01 RX ADMIN — HYDROMORPHONE HYDROCHLORIDE 2 MG: 2 TABLET ORAL at 00:00

## 2024-08-01 RX ADMIN — METHADONE HYDROCHLORIDE 5 MG: 5 TABLET ORAL at 20:32

## 2024-08-01 RX ADMIN — WARFARIN SODIUM 3 MG: 3 TABLET ORAL at 18:50

## 2024-08-01 RX ADMIN — MEROPENEM 500 MG: 500 INJECTION, POWDER, FOR SOLUTION INTRAVENOUS at 17:25

## 2024-08-01 RX ADMIN — METHADONE HYDROCHLORIDE 5 MG: 5 TABLET ORAL at 13:23

## 2024-08-01 RX ADMIN — INSULIN DEGLUDEC 34 UNITS: 100 INJECTION, SOLUTION SUBCUTANEOUS at 09:26

## 2024-08-01 RX ADMIN — HYDROMORPHONE HYDROCHLORIDE 2 MG: 2 TABLET ORAL at 20:32

## 2024-08-01 RX ADMIN — METHADONE HYDROCHLORIDE 5 MG: 5 TABLET ORAL at 09:27

## 2024-08-01 RX ADMIN — MEROPENEM 500 MG: 500 INJECTION, POWDER, FOR SOLUTION INTRAVENOUS at 09:29

## 2024-08-01 RX ADMIN — MICONAZOLE NITRATE ANTIFUNGAL POWDER: 2 POWDER TOPICAL at 09:29

## 2024-08-01 RX ADMIN — POTASSIUM CHLORIDE 20 MEQ: 750 TABLET, EXTENDED RELEASE ORAL at 09:28

## 2024-08-01 ASSESSMENT — ACTIVITIES OF DAILY LIVING (ADL)
ADLS_ACUITY_SCORE: 45
ADLS_ACUITY_SCORE: 45
ADLS_ACUITY_SCORE: 43
ADLS_ACUITY_SCORE: 45
ADLS_ACUITY_SCORE: 43
ADLS_ACUITY_SCORE: 39
ADLS_ACUITY_SCORE: 45
ADLS_ACUITY_SCORE: 42
ADLS_ACUITY_SCORE: 43
ADLS_ACUITY_SCORE: 40
ADLS_ACUITY_SCORE: 42
ADLS_ACUITY_SCORE: 42
ADLS_ACUITY_SCORE: 45
ADLS_ACUITY_SCORE: 42
ADLS_ACUITY_SCORE: 42
ADLS_ACUITY_SCORE: 43
ADLS_ACUITY_SCORE: 42
ADLS_ACUITY_SCORE: 45
ADLS_ACUITY_SCORE: 42
ADLS_ACUITY_SCORE: 42
ADLS_ACUITY_SCORE: 45
ADLS_ACUITY_SCORE: 45
ADLS_ACUITY_SCORE: 39

## 2024-08-01 NOTE — PLAN OF CARE
"  Problem: Adult Inpatient Plan of Care  Goal: Plan of Care Review  Description: The Plan of Care Review/Shift note should be completed every shift.  The Outcome Evaluation is a brief statement about your assessment that the patient is improving, declining, or no change.  This information will be displayed automatically on your shift  note.  Outcome: Adequate for Care Transition   Goal Outcome Evaluation:       Continued to educate & explain cares/treatments to pt. Pt receptive then will refuse cares. Cont. To reapproach, successful at times, other times not.       Problem: Skin or Soft Tissue Infection  Goal: Absence of Infection Signs and Symptoms  Intervention: Minimize and Manage Infection Progression  Recent Flowsheet Documentation  Taken 8/1/2024 9766 by Celine Brown RN  Isolation Precautions: contact precautions maintained     Continues on IV abx. Writer attempted to change dressings to R side/LE. Pt continued to state, \"later\", \"when pain meds kick in\", \"not now\" etc after frequent attempts. Writer noted RLE dressing had drainage on it at end of shift. Informed pt dressing needed to be changed, pt refused adamantly, stated over & over \"tomorrow, it's due tomorrow\". Writer explained PRN order to change dressing, pt stated, \"you will not touch my leg\". Writer asked pt if she was aware or risks, pt glared at pt and stated \"yes\". Pt A&O & makes own personal medical decisions.               "

## 2024-08-01 NOTE — PLAN OF CARE
Problem: Adult Inpatient Plan of Care  Goal: Plan of Care Review  Description: The Plan of Care Review/Shift note should be completed every shift.  The Outcome Evaluation is a brief statement about your assessment that the patient is improving, declining, or no change.  This information will be displayed automatically on your shift  note.  Outcome: Progressing     Problem: Comorbidity Management  Goal: Blood Pressure in Desired Range  Outcome: Progressing  Intervention: Maintain Blood Pressure Management  Recent Flowsheet Documentation  Taken 7/31/2024 1600 by Tracy Solis RN  Medication Review/Management: medications reviewed     Problem: Pain Acute  Goal: Optimal Pain Control and Function  Outcome: Progressing  Intervention: Develop Pain Management Plan  Recent Flowsheet Documentation  Taken 7/31/2024 1623 by Tracy Solis RN  Pain Management Interventions:   medication (see MAR)   around-the-clock dosing utilized   care clustered   pillow support provided   rest   repositioned  Intervention: Prevent or Manage Pain  Recent Flowsheet Documentation  Taken 7/31/2024 1600 by Tracy Solis RN  Sensory Stimulation Regulation:   television on   care clustered   quiet environment promoted  Bowel Elimination Promotion: adequate fluid intake promoted  Medication Review/Management: medications reviewed  Intervention: Optimize Psychosocial Wellbeing  Recent Flowsheet Documentation  Taken 7/31/2024 1600 by Tracy Solis RN  Supportive Measures:   active listening utilized   self-care encouraged   relaxation techniques promoted   Goal Outcome Evaluation:       AxO4. Vitally stable on RA. RLE sharp pain controlled with sched meds, PRN tylenol given. Mepilex to sacral area. Healed L BKA. RLE wrapped with kerlex, no drainage. Refusing activity. IV abx. Incontinent of urine x1.

## 2024-08-01 NOTE — PROGRESS NOTES
"   08/01/24 0945   Appointment Info   Signing Clinician's Name / Credentials (OT) Courtney William, OTR/L   Living Environment   People in Home alone   Current Living Arrangements assisted living   Home Accessibility no concerns   Self-Care   Activity/Exercise/Self-Care Comment Pt W/C-based, completes squat pivots or lateral transfers independently at baseline, independent with ADLs at baseline W/C-based, squat pivots/laterally transfers to toilet/commode.   Instrumental Activities of Daily Living (IADL)   IADL Comments Pt states she completes most IADLs independently W/C-based.   General Information   Onset of Illness/Injury or Date of Surgery 07/28/24   Referring Physician Yaritza Verduzco MD   Patient/Family Therapy Goal Statement (OT) Pt would like to return to TCU, improve independence and ultimately return to [facility].   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   Pain Assessment   Patient Currently in Pain Yes, see Vital Sign flowsheet   Range of Motion Comprehensive   General Range of Motion bilateral upper extremity ROM WNL   Strength Comprehensive (MMT)   Comment, General Manual Muscle Testing (MMT) Assessment Generalized BUE weakness noted functionally.   Bed Mobility   Bed Mobility supine-sit   Supine-Sit [place] (Bed Mobility) supervision   Assistive Device (Bed Mobility) bed rails   Comment (Bed Mobility) extended time, " increased pain   Transfers   Transfers bed-chair transfer;toilet transfer   Transfer Skill: Bed to Chair/Chair to Bed   Bed-Chair [place] (Transfers) supervision;verbal cues;1 person to manage equipment   Transfer Comments extended time   Toilet Transfer   [place] Level (Toilet Transfer) not tested   Toilet Transfer Comments Anticipate pt's ability to perform safe toilet transfer will be impacted by weakness, pain, and cognition.   Balance   Balance Comments No balance deficits noted with unsupported sitting balance at EOB.   Activities of Daily Living   BADL Assessment/Intervention lower body dressing   Lower Body Dressing Assessment/Training   Comment, (Lower Body Dressing) extended time and increased pain, decreased safety   [place] Level (Lower Body Dressing) supervision;verbal cues   Clinical Impression   Criteria for Skilled Therapeutic Interventions Met (OT) Yes, treatment indicated   OT Diagnosis Impaired ability to perform ADLs, IADLs, and transfers.   OT Problem List-Impairments impacting ADL problems related to;activity tolerance impaired;cognition;mobility;strength;pain   Assessment of Occupational Performance 1-3 Performance Deficits   Identified Performance Deficits toileting, lower body dressing   Planned Therapy Interventions (OT) ADL retraining;IADL retraining;balance training;strengthening;transfer training;home program guidelines;progressive activity/exercise;risk factor education   Clinical Decision Making Complexity (OT) problem focused assessment/low complexity   Risk & Benefits of therapy have been explained evaluation/treatment results reviewed;care plan/treatment goals reviewed;risks/benefits reviewed;current/potential barriers reviewed;participants included;patient   OT Total Evaluation Time   OT Eval, Low Complexity Minutes (22311) 15   OT Goals   Therapy Frequency (OT) 3 times/week   OT Predicted Duration/Target Date for Goal Attainment 08/08/24   OT Goals Lower Body  Dressing;Toilet Transfer/Toileting   OT: Lower Body Dressing Modified independent;using adaptive equipment   OT: Toilet Transfer/Toileting Modified independent;toilet transfer;cleaning and garment management;using adaptive equipment   Self-Care/Home Management   Self-Care/Home Mgmt/ADL, Compensatory, Meal Prep Minutes (24149) 40   Symptoms Noted During/After Treatment (Meal Preparation/Planning Training) fatigue;increased pain   Treatment Detail/Skilled Intervention Extended time needed for all tasks throughout session as patient resistant to cueing, required extended education on reasoning behind therapist recommendations/safety techniques. Pt tolerated EOB sitting for extended time with SBA. Pt removed own purewick with SBA sidelying on L side, increased pain and effort with pt refusing assistance from therapist, required rest break following d/t pain. Pt required safety cuieng during lower body dressing task of donning sock, refused assistance from therapist, able to complete with SBA and Min VC with task taking pt longer than 5 min to complete and reporting increased pain. Extended time to set up lateral transfer between EOB>W/C as pt attempting to direct set up, however initally setting up W/C with large gap between bed and chair and placing pillow between. Pt resistant to education on risks of falling or chair pushing away with this set up, willing to adjust with increased education. Pt ultimately able to perform transfer EOB>W/C with SBA and VC, additional person needed to stabilize W/C for safety. Pt requesting to remain in W/C at end of session, re-iterated with pt need to call for assistance before attempting to return to bed, pt verbalized understanding. Direct verbal handoff to RN regarding pt position in W/C, recommended RN check frequently on pt to ensure pt does not self transfer. RN verbalized understanding.   OT Discharge Planning   OT Plan lower body dressing independence, lateral scoot transfers,  problem-solve transfer to commode   OT Discharge Recommendation (DC Rec) Transitional Care Facility   OT Rationale for DC Rec Pt currently requires Ax1-2 for all ADLs and transfers, decreased safety and limited by pain. Pt very motivated to be independent, would benefit from TCU at this time.   OT Brief overview of current status SBA and very extended time with increased pain and verbal cues for safety needed for bed mobility, bed>chair transfer, and lower body dressing   Total Session Time   Timed Code Treatment Minutes 40   Total Session Time (sum of timed and untimed services) 55

## 2024-08-01 NOTE — CONSULTS
"SPIRITUAL HEALTH SERVICES - Consult Note     [facility] P[facility]     Referral Source/Reason for Visit: Routine Consult/Length of Stay          Summary and Recommendations -     Pt. Margaret told me, \"I just want to go home.\"  We talked about her experience of being hospitalized and her desire to be to able to leave.  She told me about her rosary which is an ongoing source of support to her.   We said the Our Father and I prayed for her health and comfort.  She has many friends who she misses and relies on.       Plan:   [facility] will visit [facility] during Margaret's hospitalization.       No Steiner; [employer]. '25   Intern    [facility] available 24/7 for emergent requests/referrals, either by paging the on-call  or by entering an ASAP/STAT consult in Epic (this will also page the on-call ).        Assessment          Saw pt Linda Loredo per Routine Consult/LOS.       Patient/Family Understanding of Illness and Goals of Care -   Margaret expressed a sense of receiving mixed messages regarding her treatment.  She is very eager to return home to [facility] in [place].       Distress and Loss -   Margaret grimaced frequently and said her pain was constant.       Strengths, Coping, and Resources -   Margaret talked about having close friends who check on her, including one who calls her daily.  Margaret has one daughter, who is a source of support.  Margaret prays frequently alone and with friends.          Meaning, Beliefs, and Spirituality -   Margaret's rosary is very important to her and she takes it with her wherever she goes.  We prayed together before I concluded our visit and said the Our Father.        "

## 2024-08-01 NOTE — PROGRESS NOTES
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 MRI neg for osteo RLE.      RLE wound infection  Pressure ulcer, coccyx   -Apparently getting worse since the discharge from [facility] (7/16/24).   -CRP, ESR elevated,  Normal procal  -MRI RLE neg for osteomyelitis  -WOC following  -Pain control  -Appreciate ID, signed off now.  -Continue Cont IV vancomycin and meropenem while here,, on discharge 5 days of levaquin and doxy.   -Pt somewhat non compliant, will need on going education.        Acute on chronic pain  -On methadone 5mg 4 times daily.   -Prn dilaudid also ordered.      DM-II  -Uncontrolled with A1c of 9.9  -Cont home Tresiba, sliding scale insulin with meals/bedtime, 1:10 CHO ratio added.      Paroxysmal afib  -Rate controlled  -Cont coumadin, pharmacy to dose.      Other chronic issues  Hyperlipidemia  Hypertension  Chronic right sided heart failure  Chronic normocytic anemia  CKD-III  Untreated BERLIN  Obesity  Fibromyalgia  - Stable. Cont home meds          Diet: Combination Diet Regular Diet Adult; Moderate Consistent Carb (60 g CHO per Meal) Diet  Diet    DVT Prophylaxis: Warfarin  Braga Catheter: Not present  Lines: None     Cardiac Monitoring: None  Code Status: Full Code      Clinically Significant Risk Factors              # Hypoalbuminemia: Lowest albumin = 2.5 g/dL at 7/29/2024  7:32 AM, will monitor as appropriate       # Hypertension: Noted on problem list           # DMII: A1C = N/A within past 6 months, PRESENT ON ADMISSION  # Obesity: Estimated body mass index is 37.12 kg/m  as calculated from the following:    Height as of 7/24/24: 1.676 m (5'  "6\").    Weight as of this encounter: 104.3 kg (230 lb)., PRESENT ON ADMISSION       # Financial/Environmental Concerns:           Disposition Plan     Medically Ready for Discharge: Yes             Yaritza Verduzco MD  Hospitalist Service  [facility]  Securely message with [employer] (more info)  Text page via [employer] Paging/Directory   ______________________________________________________________________    Interval History   Chart reviewed and events noted.  Patient seen and examined.   No new issues, wants to return to her care home but they wont take her given her needs. Pain ok. No fever.       Physical Exam   Vital Signs: Temp: 97.7  F (36.5  C) Temp src: Axillary BP: 120/82 Pulse: 112   Resp: 18 SpO2: 95 % O2 Device: None (Room air)    Weight: 230 lbs 0 oz    General: Pleasant female in NAD  CVS:RRR  RS:CTAB  Neurology:Awake, alert, oriented  Ext:LLE amputation, RLE covered in clean dressing      Medical Decision Making       >45 MINUTES SPENT BY ME on the date of service doing chart review, history, exam, documentation & further activities per the note.  MANAGEMENT DISCUSSED with the following over the past 24 hours: Patient, bedside RN, YUDY/SHERYL. Last updated daughter over the phone on 7/31       Data     I have personally reviewed the following data over the past 24 hrs:    N/A  \   N/A   / N/A     N/A N/A N/A /  106 (H)   N/A N/A 0.81 \     INR:  2.23 (H) PTT:  N/A   D-dimer:  N/A Fibrinogen:  N/A       "

## 2024-08-01 NOTE — PROGRESS NOTES
"PT Evaluation   08/01/24 1400   Appointment Info   Signing Clinician's Name / Credentials (PT) Diana Jovel PT, DPT   Living Environment   People in Home alone   Current Living Arrangements assisted living   Home Accessibility no concerns   Self-Care   Current Activity Tolerance poor   Equipment Currently Used at Home grab bar, toilet;glucometer;raised toilet seat;walker, rolling   Fall history within last six months no   Activity/Exercise/Self-Care Comment Pt W/C-based, completes squat pivots or lateral transfers independently at baseline, independent with ADLs at baseline W/C-based, squat pivots/laterally transfers to toilet/commode, primarily using BUEs to transfer.   General Information   Onset of Illness/Injury or Date of Surgery 07/28/24   Referring Physician Yaritza Verduzco MD   Patient/Family Therapy Goals Statement (PT) Go home.   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 MRI neg for osteo RLE.\"   Existing Precautions/Restrictions fall   Weight-Bearing Status - LLE other (see comments)  (L BKA)   Cognition   Affect/Mental Status (Cognition) WFL   Orientation Status (Cognition) oriented x 4   Follows Commands (Cognition) WFL   Safety Deficit (Cognition) awareness of need for assistance;minimal deficit   Pain Assessment   Patient Currently in Pain Yes, see Vital Sign flowsheet  (pain in RLE & in LUE (from IV). Declining wanting to ask RN for pain medicine at this time)   Integumentary/Edema   Integumentary/Edema Comments RLE wrapped; L BKA   Posture    Posture Forward head position   Range of Motion (ROM)   Range of Motion ROM is WFL   Strength (Manual Muscle Testing)   Strength " (Manual Muscle Testing) Deficits observed during functional mobility   Bed Mobility   Comment, (Bed Mobility) Pt up in chair when therapist arrived. Pt declining laying back down in bed after transfer. CNA present and aware, would let RN know. from OT note pt was able to complete supine>sit SBA with extended time, with pain.   Transfers   Transfers wheelchair transfer   Impairments Contributing to Impaired Transfers decreased strength;pain   Wheelchair Transfer   [place] (Wheelchair Transfer) supervision;verbal cues;nonverbal cues (demo/gesture)   Type (Wheelchair Transfer) squat pivot   Assistive Device (Wheelchair Transfer) wheelchair   Comment, (Wheelchair Transfer) Pt completes more of a lateral scoot transfer with close SBA from PT and rehab aide holding manual wc in place with brakes engaged, extended time and visibly effortful and pt reporting pain in LUE. Patient declining cues or hands on assist from PT, PT maintaining close SBA for safety.   Gait/Stairs (Locomotion)   [place] (Gait) unable to assess   Comment, (Gait/Stairs) Pt does not amb.   Balance   Balance other (describe)   Balance Comments IND sitting balance   Clinical Impression   Criteria for Skilled Therapeutic Intervention Yes, treatment indicated   PT Diagnosis (PT) Impaired functional mobility   Influenced by the following impairments Hx L BKA; pain; impaired strength and activity tolerance   Functional limitations due to impairments impaired transfers & endurance;   Clinical Presentation (PT Evaluation Complexity) stable   Clinical Presentation Rationale clinical judgment   Clinical Decision Making (Complexity) low complexity   Planned Therapy Interventions (PT) balance training;bed mobility training;patient/family education;strengthening;transfer training;progressive activity/exercise;risk factor education;home program guidelines;home exercise program;neuromuscular re-education;ROM (range of  motion);stretching;wheelchair management/propulsion training   Risk & Benefits of therapy have been explained evaluation/treatment results reviewed;participants included;patient   Clinical Impression Comments Patient appreciative of session.   PT Total Evaluation Time   PT Eval, Low Complexity Minutes (66226) 21   Physical Therapy Goals   PT Frequency 4x/week   PT Predicted Duration/Target Date for Goal Attainment 08/08/24   PT: Bed Mobility Modified independent;Supine to/from sit   PT: Transfers Independent;Bed to/from chair   PT Discharge Planning   PT Plan seated LE therex; prog bed mob & transfers   PT Discharge Recommendation (DC Rec) Transitional Care Facility   PT Rationale for DC Rec Pt typically IND at baseline, transferring to/from manual wc. At this time patient is below PLOF, requiring close SBA and therapist ensuring safety with transfer to decrease fall risk. Patient is very motivated to be IND with ADLs. Recommend TCU at discharge.   PT Brief overview of current status close SBAx1-2 for all mobility   Total Session Time   Total Session Time (sum of timed and untimed services) 21

## 2024-08-01 NOTE — PROGRESS NOTES
"Care Management Follow Up    Length of Stay (days): 4    Expected Discharge Date: 08/02/2024    Anticipated Discharge Plan:  TBD      Transportation: TBD    PT Recommendations:  No therapy consults placed, paged provider to place, pt will likely need some type of therapy. But pt is from LTC so unclear if pt will participate. So up to provider and what she thinks is appropriate.   OT Recommendations:        Barriers to Discharge: medical stability/ possibly med ready today likely needs TCU or back to LTC. Id signed off.       Prior Living Situation: \"Patient recently at [facility] TCU, does not have a bed hold as of 7/31.  Lives at [facility]. Wheelchair user at baseline. Gets assist with ADL's as needed, full assist with IADL's.\"        Patient/Spokesperson Updated: No    Additional Information:  Chart reviewed.    CM updates:  RNCM paged provider to have her place therapy consults to determine if pt needs TCU at this time. RNCM then realized pt is from LTC. Pt has been refusing cares in hospital unclear if she will work with therapy. [facility] plans to talk with pt today and will update writer with plan.         RNCM called [facility] admissions, pt actually was living in [place], and they said she cannot return to [facility], needs LTC.      RNCM paged hosp regarding. She placed therapy consults and we will determine level of care pt needs.         Cm will continue to follow plan of care,review recommendations, and assist with any discharge needs anticipated.       Lyndsey Gillis RN      "

## 2024-08-01 NOTE — PLAN OF CARE
Problem: Chronic Kidney Disease  Goal: Acceptable Pain Control  Intervention: Prevent or Manage Pain  Recent Flowsheet Documentation  Taken 7/31/2024 2353 by Dieudonne Redmond RN  Pain Management Interventions:   medication (see MAR)   around-the-clock dosing utilized   care clustered   pillow support provided   rest   repositioned     Problem: Pain Acute  Goal: Optimal Pain Control and Function  Intervention: Develop Pain Management Plan  Recent Flowsheet Documentation  Taken 7/31/2024 2353 by Dieudonne Redmond RN  Pain Management Interventions:   medication (see MAR)   around-the-clock dosing utilized   care clustered   pillow support provided   rest   repositioned  Intervention: Prevent or Manage Pain  Recent Flowsheet Documentation  Taken 7/31/2024 2353 by Dieudonne Redmond RN  Sensory Stimulation Regulation:   television on   care clustered   quiet environment promoted  Bowel Elimination Promotion: adequate fluid intake promoted  Medication Review/Management: medications reviewed  Intervention: Optimize Psychosocial Wellbeing  Recent Flowsheet Documentation  Taken 7/31/2024 2353 by Dieudonne Redmond RN  Supportive Measures:   active listening utilized   self-care encouraged   relaxation techniques promoted   Goal Outcome Evaluation:        Pt A/O get irritated fast, complained pain at beginning of the shift PRN oral dilaudid given  and pt slept through the night. Contact precaution precaution maintained.schedule Abx given through IVF, dressing is dry. Clean and intact. Make needs known.

## 2024-08-02 LAB
BACTERIA BLD CULT: NO GROWTH
CREAT SERPL-MCNC: 0.74 MG/DL (ref 0.51–0.95)
EGFRCR SERPLBLD CKD-EPI 2021: 82 ML/MIN/1.73M2
GLUCOSE BLDC GLUCOMTR-MCNC: 104 MG/DL (ref 70–99)
GLUCOSE BLDC GLUCOMTR-MCNC: 106 MG/DL (ref 70–99)
GLUCOSE BLDC GLUCOMTR-MCNC: 147 MG/DL (ref 70–99)
GLUCOSE BLDC GLUCOMTR-MCNC: 157 MG/DL (ref 70–99)
GLUCOSE BLDC GLUCOMTR-MCNC: 91 MG/DL (ref 70–99)
GLUCOSE BLDC GLUCOMTR-MCNC: 99 MG/DL (ref 70–99)
HOLD SPECIMEN: NORMAL
INR PPP: 2.78 (ref 0.85–1.15)

## 2024-08-02 PROCEDURE — 82565 ASSAY OF CREATININE: CPT | Performed by: INTERNAL MEDICINE

## 2024-08-02 PROCEDURE — 99232 SBSQ HOSP IP/OBS MODERATE 35: CPT | Performed by: INTERNAL MEDICINE

## 2024-08-02 PROCEDURE — 250N000013 HC RX MED GY IP 250 OP 250 PS 637: Performed by: INTERNAL MEDICINE

## 2024-08-02 PROCEDURE — 36415 COLL VENOUS BLD VENIPUNCTURE: CPT | Performed by: INTERNAL MEDICINE

## 2024-08-02 PROCEDURE — 120N000001 HC R&B MED SURG/OB

## 2024-08-02 PROCEDURE — 85610 PROTHROMBIN TIME: CPT | Performed by: INTERNAL MEDICINE

## 2024-08-02 PROCEDURE — 250N000013 HC RX MED GY IP 250 OP 250 PS 637: Performed by: STUDENT IN AN ORGANIZED HEALTH CARE EDUCATION/TRAINING PROGRAM

## 2024-08-02 RX ORDER — WARFARIN SODIUM 1 MG/1
1 TABLET ORAL
Status: COMPLETED | OUTPATIENT
Start: 2024-08-02 | End: 2024-08-02

## 2024-08-02 RX ADMIN — INSULIN DEGLUDEC 34 UNITS: 100 INJECTION, SOLUTION SUBCUTANEOUS at 08:56

## 2024-08-02 RX ADMIN — TORSEMIDE 60 MG: 20 TABLET ORAL at 08:39

## 2024-08-02 RX ADMIN — POTASSIUM CHLORIDE 20 MEQ: 750 TABLET, EXTENDED RELEASE ORAL at 08:38

## 2024-08-02 RX ADMIN — ATORVASTATIN CALCIUM 40 MG: 40 TABLET, FILM COATED ORAL at 21:24

## 2024-08-02 RX ADMIN — WARFARIN SODIUM 1 MG: 1 TABLET ORAL at 18:41

## 2024-08-02 RX ADMIN — POTASSIUM CHLORIDE 20 MEQ: 750 TABLET, EXTENDED RELEASE ORAL at 21:24

## 2024-08-02 RX ADMIN — MICONAZOLE NITRATE ANTIFUNGAL POWDER: 2 POWDER TOPICAL at 09:01

## 2024-08-02 RX ADMIN — INSULIN ASPART 2 UNITS: 100 INJECTION, SOLUTION INTRAVENOUS; SUBCUTANEOUS at 18:41

## 2024-08-02 RX ADMIN — LEVOFLOXACIN 500 MG: 500 TABLET, FILM COATED ORAL at 08:37

## 2024-08-02 RX ADMIN — METHADONE HYDROCHLORIDE 5 MG: 5 TABLET ORAL at 08:37

## 2024-08-02 RX ADMIN — INSULIN ASPART 3 UNITS: 100 INJECTION, SOLUTION INTRAVENOUS; SUBCUTANEOUS at 08:57

## 2024-08-02 RX ADMIN — METHADONE HYDROCHLORIDE 5 MG: 5 TABLET ORAL at 23:57

## 2024-08-02 RX ADMIN — DOXYCYCLINE 100 MG: 100 CAPSULE ORAL at 21:24

## 2024-08-02 RX ADMIN — METHADONE HYDROCHLORIDE 5 MG: 5 TABLET ORAL at 18:41

## 2024-08-02 RX ADMIN — INSULIN ASPART 5 UNITS: 100 INJECTION, SOLUTION INTRAVENOUS; SUBCUTANEOUS at 13:18

## 2024-08-02 RX ADMIN — METHADONE HYDROCHLORIDE 5 MG: 5 TABLET ORAL at 13:17

## 2024-08-02 RX ADMIN — THERA TABS 1 TABLET: TAB at 13:17

## 2024-08-02 RX ADMIN — DOXYCYCLINE 100 MG: 100 CAPSULE ORAL at 08:38

## 2024-08-02 ASSESSMENT — ACTIVITIES OF DAILY LIVING (ADL)
ADLS_ACUITY_SCORE: 39
ADLS_ACUITY_SCORE: 43
ADLS_ACUITY_SCORE: 39
ADLS_ACUITY_SCORE: 43
ADLS_ACUITY_SCORE: 39
ADLS_ACUITY_SCORE: 43
ADLS_ACUITY_SCORE: 39
ADLS_ACUITY_SCORE: 39
ADLS_ACUITY_SCORE: 43
ADLS_ACUITY_SCORE: 39

## 2024-08-02 NOTE — PROGRESS NOTES
"Care Management Follow Up    Length of Stay (days): 5    Expected Discharge Date: 08/03/2024    Anticipated Discharge Plan: Transitional care (TCU) is recommended for continued therapy and skilled nursing.    Transportation: Confirmed Wheelchair. Transportation costs discussed? Yes. Discussed with patient    PT Recommendations: Transitional Care Facility  OT Recommendations:  Transitional Care Facility     Barriers to Discharge: placement    Prior Living Situation: assisted living with facility resident     Patient/Spokesperson Updated: Yes. Who? patient    Additional Information:  Patient is a resident of [facility] assisted living ([facility] cannot meet her wound care needs. Transitional care (TCU) is recommended for continued therapy and skilled nursing. Met with patient to discuss; she agrees and would prefer [facility]. She is anxious to go, stating \"get me out of here!\" Although very pleasant. Spoke with admissions who states that they do not have any beds and also cannot meet her wound care needs.  Writer made more referrals and updated patient that the search for TCU will need to be expanded.   She is requesting medical transportation.    Destination    Service Provider Request Status Selected Services Address Phone Fax Patient Preferred   [facility] (SNF)  Pending - Request Sent N/A [address] [address] [phone] --   [facility] - REFERRAL ONLY (SNF)  Declined  Pending - Request Sent N/A [address] [facility] [address] [phone] --   Current Capacity last updated by Sary Barger MA on 2/20/2024  7:46 AM    Admissions [phone]            [facility] [place] (NF)  Pending - Request Sent N/A [address] [facility] [address] [phone] --   [facility] - Referral Only(SNF)(TCU)  Declined  Pending - Request Sent " N/A [address], [place] [address] [phone] --   Current Capacity last updated by Juan Briggs RN on 7/16/2024  3:28 PM    Per [place], they do not accept [employer] [phone] (landline) [phone] (cell)            [facility] - REFERRAL ONLY (SNF)  Pending - Request Sent N/A [address] [facility] [id number] [phone] --   [facility] ([facility])  Pending - Request Sent N/A [address] [facility] [id number] [phone] --   Current Capacity last updated by Juan Briggs RN on 1/30/2024  3:02 PM    Send referrals to [facility] - Hard scripts must be faxed to [phone] ([place])            [facility] ([facility])  Pending - Request Sent N/A [address] [facility] [id number] [phone] --   Current Capacity last updated by Juan Briggs RN on 9/8/2023  2:59 PM    No weekend admits            [facility] (NF)  Declined  Pending - Request Sent N/A [address] [facility] [id number] [phone] --   [facility](TCU)  Declined  No compliant refused cares/ therapy N/A [address] [facility] [id number] [phone] --   Current Capacity last updated by Sary Barger MA on 7/25/2024  1:02 PM    Always send full referral            Internal Comment last updated by Khalif Arango LGSW 7/29/2024  9:19 AM    Patient has a bed hold; confirmed with Mel on 7/29             [facility] (NF)  Declined  Pt lived in [place] , cannot return to [place] N/A [address] [facility] [address] [phone] --   Current Capacity last updated by Sary Barger MA on 8/1/2024  8:58 AM    7/29: We are full until 8/6            Additional referral made to [facility]. Would prefer [facility].  12:03 PM:  Received a call from  patient's daughter asking that a referral also be made to [facility]. Spoke with admissions at [facility] who states that they cannot accept patient back due to behaviors.     Terrie Canales RN

## 2024-08-02 NOTE — PROGRESS NOTES
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 MRI neg for osteo RLE.      RLE wound infection  Pressure ulcer, coccyx   -Apparently getting worse since the discharge from [facility] (7/16/24).   -CRP, ESR elevated,  Normal procal  -MRI RLE neg for osteomyelitis  -WOC following  -Pain control  -Appreciate ID, signed off now.  -Continue Cont IV vancomycin and meropenem while here,, on discharge 5 days of levaquin and doxy.   -Pt somewhat non compliant, will need on going education.      Acute on chronic pain  -On methadone 5mg 4 times daily.   -Prn dilaudid also ordered.      DM-II  -Uncontrolled with A1c of 9.9  -Cont home Tresiba, sliding scale insulin with meals/bedtime, 1:10 CHO ratio added.      Paroxysmal afib  -Rate controlled  -Cont coumadin, pharmacy to dose.      Other chronic issues  Hyperlipidemia  Hypertension  Chronic right sided heart failure  Chronic normocytic anemia  CKD-III  Untreated BERLIN  Obesity  Fibromyalgia  - Stable. Cont home meds          Diet: Combination Diet Regular Diet Adult; Moderate Consistent Carb (60 g CHO per Meal) Diet  Diet    DVT Prophylaxis: Warfarin  Braga Catheter: Not present  Lines: None     Cardiac Monitoring: None  Code Status: Full Code      Clinically Significant Risk Factors              # Hypoalbuminemia: Lowest albumin = 2.5 g/dL at 7/29/2024  7:32 AM, will monitor as appropriate     # Hypertension: Noted on problem list           # DMII: A1C = N/A within past 6 months   # Obesity: Estimated body mass index is 37.12 kg/m  as calculated from the following:    Height as of 7/24/24: 1.676 m (5' 6\").    Weight as of this " encounter: 104.3 kg (230 lb).      # Financial/Environmental Concerns:           Disposition Plan     Medically Ready for Discharge: Ready Now    Await TCU placement         Josi Overton MD  Hospitalist Service  [facility]  Securely message with [employer] (more info)  Text page via [employer] Paging/Directory   ______________________________________________________________________    Interval History   C/o wound pain, no f/c, no n/v, eager to go to TCU    Physical Exam   Vital Signs: Temp: 97.8  F (36.6  C) Temp src: Oral BP: 111/58 Pulse: 79   Resp: 18 SpO2: 98 % O2 Device: None (Room air)    Weight: 230 lbs 0 oz    General.  Awake alert oriented not in acute distress.Obese  HEENT.  Pupils equal round react to light, anicteric, EOM intact.  Neck supple no JVD.  CVS regular rhythm no murmur gallops.  Lungs.  Clear to auscultation bilateral no wheezing or rales.  Abdomen.  Soft nontender bowel sounds present.  Extremities.  S/p left TKA, right leg wound in dressing  Neurological.  Awake and alert. No focal deficit.  Skin no rash. No pallor.  Psych. Normal mood.      Medical Decision Making       46 MINUTES SPENT BY ME on the date of service doing chart review, history, exam, documentation & further activities per the note.      Data     I have personally reviewed the following data over the past 24 hrs:    N/A  \   N/A   / N/A     N/A N/A N/A /  91   N/A N/A 0.74 \     INR:  2.78 (H) PTT:  N/A   D-dimer:  N/A Fibrinogen:  N/A       Imaging results reviewed over the past 24 hrs:   No results found for this or any previous visit (from the past 24 hour(s)).

## 2024-08-02 NOTE — PROGRESS NOTES
Patient's left arm and hand was swollen. The IV was flushed. Flushed without difficulty. The patient denied pain. The provider was notified. The provider stated it will be assessed tomorrow morning.

## 2024-08-02 NOTE — PROGRESS NOTES
"SPIRITUAL HEALTH SERVICES - Progress Note  [facility] P4    Referral Source: Follow-Up per Patient Request    Pt. Margaret talked about her frustration about not having a TCU to go to yet. She told me, \"I just want to get out of here.\" She asked me to pray for her, so I did.    Plan:   [facility] available for follow up.        No Steiner; Mdiv. '25   Intern    [facility] available 24/7 for emergent requests/referrals, either by paging the on-call  or by entering an ASAP/STAT consult in Epic (this will also page the on-call ).        "

## 2024-08-02 NOTE — PLAN OF CARE
"Problem: Adult Inpatient Plan of Care  Goal: Optimal Comfort and Wellbeing  Outcome: Progressing  Problem: Risk for Delirium  Goal: Improved Behavioral Control  Outcome: Progressing  Problem: Comorbidity Management  Goal: Blood Glucose Levels Within Targeted Range  Outcome: Progressing  Problem: Skin Injury Risk Increased  Goal: Skin Health and Integrity  Outcome: Progressing     AO, VSS on RA, generalized pain controlled with scheduled medications. Swelling +1 to MAX, pt agreeable to PIV being removed. Pt participating in cares, asking appropriate questions, etc. Pt verbalizing anxiety surrounding discharge- states \"I want to go home, get me out of here!\" Discharge pending TCU placement, per MD Overton pt is medically ready to discharge. Purewick intact. RLE elevated on pillows, dressing CDI, WDL. No acute events reported at this time.    Jerry Pang RN    "

## 2024-08-02 NOTE — PLAN OF CARE
Problem: Risk for Delirium  Goal: Improved Attention and Thought Clarity  Outcome: Progressing  Intervention: Maximize Cognitive Function  Recent Flowsheet Documentation  Taken 8/2/2024 0023 by Una Foreman RN  Reorientation Measures: clock in view     Problem: Pain Acute  Goal: Optimal Pain Control and Function  Outcome: Progressing  Intervention: Develop Pain Management Plan  Recent Flowsheet Documentation  Taken 8/2/2024 0023 by Una Foreman RN  Pain Management Interventions: medication offered but refused  Intervention: Prevent or Manage Pain  Recent Flowsheet Documentation  Taken 8/2/2024 0023 by Una Foreman RN  Bowel Elimination Promotion: adequate fluid intake promoted  Intervention: Optimize Psychosocial Wellbeing  Recent Flowsheet Documentation  Taken 8/2/2024 0023 by Una Foreman RN  Supportive Measures: active listening utilized     Problem: Chronic Kidney Disease  Goal: Optimal Coping with Chronic Illness  Outcome: Progressing  Intervention: Support Psychosocial Response  Recent Flowsheet Documentation  Taken 8/2/2024 0023 by Una Foreman RN  Supportive Measures: active listening utilized   Goal Outcome Evaluation:       Pt is A/O, with some anxiousness, elevating right leg on a pillow, using pure wick which is draining fine, VSS, denied pain, .

## 2024-08-02 NOTE — PROGRESS NOTES
Brief Progress Note:    Event: Was asked to evaluate left arm swelling at the site of the IV.     HPI/Exam: Nursing stated that the arm was swollen at the start of nursing shift, got slightly more swollen with IV flushes and vancomycin during the shift, and has now decreased in swelling since. Flushes were given without difficulty. No medications currently running. Concern for possible infiltration vs DVT. There is no redness, warmth to touch. One bruise present near IV site, another more proximally, patient denies pain or change in the status of those bruises throughout the day.     Assessment/Plan:  Likely IV infiltration, no meds left to give through IV tonight. Hospitalist was notified, plan to assess and change IV in the AM. Elevate the arm to decrease swelling. Low threshold to page/reassess if redness/pain/warmth to touch develops.    Jl Elizabeth MD, PGY1  [facility]

## 2024-08-02 NOTE — PLAN OF CARE
Notified about loss of IV access, infiltrated IV. Patient is highly resistant to routine medical care such as caring for her wounds or placing an IV. Reviewed Dr. Verduzco's and Dr. Boyd's notes, seems infection is up and down, somewhat chronic.    Didn't place another IV tonight due to patient resistance. Held IV antibiotics and ordered recommended oral antibiotics for the morning. But defer to day team if they prefer to get another IV and continue IV therapy.

## 2024-08-02 NOTE — PLAN OF CARE
The swelling to the left arm slightly increased after IV antibiotics infused. However, the IV flushed without resistance. The nurse explained to the patient the IV should be removed just in case. The patient refused. The patient expressed frustration and stated she did not want another IV placement. The cross cover provider was notified of loss of IV. With notes from the ID doctor, the provider changed her to oral antibiotics until further assessment by the day team. In addition, because the swelling increased in her left arm a provider was notified to assess at bedside. No redness or warmth. No concern for DVT. The edema could be positional. It is now elevated on 3 pillows. Because Vanco was infused, the provider is investigating the risk when infiltration occurs.      Goal Outcome Evaluation:    Problem: Pain Acute  Goal: Optimal Pain Control and Function  Outcome: Progressing  Intervention: Develop Pain Management Plan  Recent Flowsheet Documentation  Taken 8/1/2024 2032 by Adrianne Castro RN  Pain Management Interventions:   medication (see MAR)   pillow support provided   repositioned  Taken 8/1/2024 1703 by Adrianne Castro RN  Pain Management Interventions:   medication (see MAR)   emotional support   pillow support provided   repositioned  Intervention: Prevent or Manage Pain  Recent Flowsheet Documentation  Taken 8/1/2024 1640 by Adrianne Castro RN  Medication Review/Management: medications reviewed

## 2024-08-03 LAB
CREAT SERPL-MCNC: 0.86 MG/DL (ref 0.51–0.95)
EGFRCR SERPLBLD CKD-EPI 2021: 68 ML/MIN/1.73M2
GLUCOSE BLDC GLUCOMTR-MCNC: 121 MG/DL (ref 70–99)
GLUCOSE BLDC GLUCOMTR-MCNC: 130 MG/DL (ref 70–99)
GLUCOSE BLDC GLUCOMTR-MCNC: 148 MG/DL (ref 70–99)
GLUCOSE BLDC GLUCOMTR-MCNC: 158 MG/DL (ref 70–99)
GLUCOSE BLDC GLUCOMTR-MCNC: 79 MG/DL (ref 70–99)
GLUCOSE BLDC GLUCOMTR-MCNC: 90 MG/DL (ref 70–99)
HOLD SPECIMEN: NORMAL
INR PPP: 2.89 (ref 0.85–1.15)

## 2024-08-03 PROCEDURE — 250N000013 HC RX MED GY IP 250 OP 250 PS 637: Performed by: INTERNAL MEDICINE

## 2024-08-03 PROCEDURE — 120N000001 HC R&B MED SURG/OB

## 2024-08-03 PROCEDURE — 250N000013 HC RX MED GY IP 250 OP 250 PS 637: Performed by: STUDENT IN AN ORGANIZED HEALTH CARE EDUCATION/TRAINING PROGRAM

## 2024-08-03 PROCEDURE — 85610 PROTHROMBIN TIME: CPT | Performed by: INTERNAL MEDICINE

## 2024-08-03 PROCEDURE — 36415 COLL VENOUS BLD VENIPUNCTURE: CPT | Performed by: INTERNAL MEDICINE

## 2024-08-03 PROCEDURE — 99231 SBSQ HOSP IP/OBS SF/LOW 25: CPT | Performed by: INTERNAL MEDICINE

## 2024-08-03 PROCEDURE — 82565 ASSAY OF CREATININE: CPT | Performed by: INTERNAL MEDICINE

## 2024-08-03 RX ORDER — WARFARIN SODIUM 1 MG/1
1 TABLET ORAL
Status: COMPLETED | OUTPATIENT
Start: 2024-08-03 | End: 2024-08-03

## 2024-08-03 RX ADMIN — INSULIN ASPART 3 UNITS: 100 INJECTION, SOLUTION INTRAVENOUS; SUBCUTANEOUS at 12:46

## 2024-08-03 RX ADMIN — LEVOFLOXACIN 500 MG: 500 TABLET, FILM COATED ORAL at 08:54

## 2024-08-03 RX ADMIN — METHADONE HYDROCHLORIDE 5 MG: 5 TABLET ORAL at 20:53

## 2024-08-03 RX ADMIN — THERA TABS 1 TABLET: TAB at 11:34

## 2024-08-03 RX ADMIN — POTASSIUM CHLORIDE 20 MEQ: 750 TABLET, EXTENDED RELEASE ORAL at 08:54

## 2024-08-03 RX ADMIN — WARFARIN SODIUM 1 MG: 1 TABLET ORAL at 17:35

## 2024-08-03 RX ADMIN — INSULIN DEGLUDEC 34 UNITS: 100 INJECTION, SOLUTION SUBCUTANEOUS at 08:57

## 2024-08-03 RX ADMIN — METHADONE HYDROCHLORIDE 5 MG: 5 TABLET ORAL at 17:35

## 2024-08-03 RX ADMIN — DOXYCYCLINE 100 MG: 100 CAPSULE ORAL at 08:55

## 2024-08-03 RX ADMIN — INSULIN ASPART 1 UNITS: 100 INJECTION, SOLUTION INTRAVENOUS; SUBCUTANEOUS at 17:34

## 2024-08-03 RX ADMIN — INSULIN ASPART 2 UNITS: 100 INJECTION, SOLUTION INTRAVENOUS; SUBCUTANEOUS at 08:58

## 2024-08-03 RX ADMIN — MICONAZOLE NITRATE ANTIFUNGAL POWDER: 2 POWDER TOPICAL at 08:59

## 2024-08-03 RX ADMIN — METHADONE HYDROCHLORIDE 5 MG: 5 TABLET ORAL at 08:54

## 2024-08-03 RX ADMIN — TORSEMIDE 60 MG: 20 TABLET ORAL at 08:55

## 2024-08-03 RX ADMIN — POTASSIUM CHLORIDE 20 MEQ: 750 TABLET, EXTENDED RELEASE ORAL at 20:52

## 2024-08-03 RX ADMIN — ATORVASTATIN CALCIUM 40 MG: 40 TABLET, FILM COATED ORAL at 20:53

## 2024-08-03 RX ADMIN — METHADONE HYDROCHLORIDE 5 MG: 5 TABLET ORAL at 12:46

## 2024-08-03 RX ADMIN — DOXYCYCLINE 100 MG: 100 CAPSULE ORAL at 20:53

## 2024-08-03 ASSESSMENT — ACTIVITIES OF DAILY LIVING (ADL)
ADLS_ACUITY_SCORE: 39
ADLS_ACUITY_SCORE: 43
ADLS_ACUITY_SCORE: 43
ADLS_ACUITY_SCORE: 39
ADLS_ACUITY_SCORE: 43
ADLS_ACUITY_SCORE: 39
ADLS_ACUITY_SCORE: 43
ADLS_ACUITY_SCORE: 39
ADLS_ACUITY_SCORE: 43
ADLS_ACUITY_SCORE: 43
ADLS_ACUITY_SCORE: 39
ADLS_ACUITY_SCORE: 43
ADLS_ACUITY_SCORE: 39
ADLS_ACUITY_SCORE: 43

## 2024-08-03 NOTE — PROGRESS NOTES
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 MRI neg for osteo RLE.      RLE wound infection  Pressure ulcer, coccyx   -Apparently getting worse since the discharge from [facility] (7/16/24).   -CRP, ESR elevated,  Normal procal  -MRI RLE neg for osteomyelitis  -WOC following  -Pain control  -Appreciate ID, signed off now.  -Continue Cont IV vancomycin and meropenem while here,, on discharge 5 days of levaquin and doxy.   -Pt somewhat non compliant, will need on going education.      Acute on chronic pain  -On methadone 5mg 4 times daily.   -Prn dilaudid also ordered.      DM-II  -Uncontrolled with A1c of 9.9  -Cont home Tresiba, sliding scale insulin with meals/bedtime, 1:10 CHO ratio added.      Paroxysmal afib  -Rate controlled  -Cont coumadin, pharmacy to dose.      Other chronic issues  Hyperlipidemia  Hypertension  Chronic right sided heart failure  Chronic normocytic anemia  CKD-III  Untreated BERLIN  Obesity  Fibromyalgia  - Stable. Cont home meds          Diet: Combination Diet Regular Diet Adult; Moderate Consistent Carb (60 g CHO per Meal) Diet  Diet    DVT Prophylaxis: Warfarin  Braga Catheter: Not present  Lines: None     Cardiac Monitoring: None  Code Status: Full Code      Clinically Significant Risk Factors              # Hypoalbuminemia: Lowest albumin = 2.5 g/dL at 7/29/2024  7:32 AM, will monitor as appropriate     # Hypertension: Noted on problem list           # DMII: A1C = N/A within past 6 months   # Obesity: Estimated body mass index is 37.12 kg/m  as calculated from the following:    Height as of 7/24/24: 1.676 m (5' 6\").    Weight as of this " encounter: 104.3 kg (230 lb).      # Financial/Environmental Concerns:           Disposition Plan     Medically Ready for Discharge: Ready Now    Awaiting TCU placement         Josi Overton MD  Hospitalist Service  [facility]  Securely message with [employer] (more info)  Text page via [employer] Paging/Directory   ______________________________________________________________________    Interval History   Uneventful night; c/o same pain; eager to go to TCU     Physical Exam   Vital Signs: Temp: 98.5  F (36.9  C) Temp src: Oral BP: 124/63 Pulse: 76   Resp: 18 SpO2: 93 % O2 Device: None (Room air)    Weight: 230 lbs 0 oz    General.  Awake alert oriented not in acute distress.  HEENT.  Pupils equal round react to light, anicteric, EOM intact.  Neck supple no JVD.  CVS regular rhythm no murmur gallops.  Lungs.  Clear to auscultation bilateral no wheezing or rales.  Abdomen.  Soft nontender bowel sounds present.  Extremities.  S/p left TKA; right leg wound in dressing.  Neurological.  Awake and alert. No focal deficit.  Skin no rash. No pallor.  Psych. Normal mood.      Medical Decision Making       34 MINUTES SPENT BY ME on the date of service doing chart review, history, exam, documentation & further activities per the note.      Data     I have personally reviewed the following data over the past 24 hrs:    N/A  \   N/A   / N/A     N/A N/A N/A /  158 (H)   N/A N/A 0.86 \     INR:  2.89 (H) PTT:  N/A   D-dimer:  N/A Fibrinogen:  N/A       Imaging results reviewed over the past 24 hrs:   No results found for this or any previous visit (from the past 24 hour(s)).

## 2024-08-03 NOTE — PLAN OF CARE
Problem: Adult Inpatient Plan of Care  Goal: Optimal Comfort and Wellbeing  Outcome: Progressing  Problem: Comorbidity Management  Goal: Blood Glucose Levels Within Targeted Range  Outcome: Progressing  Problem: Skin Injury Risk Increased  Goal: Skin Health and Integrity  Outcome: Progressing  Problem: Pain Acute  Goal: Optimal Pain Control and Function  Outcome: Progressing    AO, VSS on RA, generalized pain controlled with scheduled medications. Wound cares completed this shift. Pt very anxious to leave. Purewick intact. Residual +1 LUE swelling remains. No major changes from previous day shift. No acute events reported at this time.    Jerry Pang RN

## 2024-08-03 NOTE — PLAN OF CARE
"  Problem: Pain Acute  Goal: Optimal Pain Control and Function  Outcome: Progressing  Intervention: Prevent or Manage Pain  Recent Flowsheet Documentation  Taken 8/2/2024 2357 by Mervat Greer, RN  Medication Review/Management: medications reviewed     Problem: Infection  Goal: Absence of Infection Signs and Symptoms  Outcome: Progressing  Intervention: Prevent or Manage Infection  Recent Flowsheet Documentation  Taken 8/2/2024 2357 by Mervat Greer, RN  Isolation Precautions: contact precautions maintained     Goal Outcome Evaluation:           Chronic generalized pain from her neuropathy per patient. On scheduled pain meds. \"Pain always a six, it doesn't change\". No PRN requested tonight. On oral antibiotic for her right leg wound infection. Refused repositioning. \"I do that on my own\". Reminded her that she has a pressure ulcer on her buttocks, \"It's better\". Hoping to discharge to TCU today.                      "

## 2024-08-03 NOTE — PROGRESS NOTES
Chart reviewed. Check referral status for TCU and all current referrals are still pending. CM will continue to follow for coordination of discharge plan.    ROMULO Omer

## 2024-08-03 NOTE — PLAN OF CARE
The patient was pleasant this evening. She is looking forward to discharging. Dressing on right leg is clean, dry, and intact. Left arm swelling had decreased considerably since last evening. Taking oral antibiotics as ordered.     Goal Outcome Evaluation:    Problem: Adult Inpatient Plan of Care  Goal: Optimal Comfort and Wellbeing  Outcome: Progressing  Intervention: Monitor Pain and Promote Comfort  Recent Flowsheet Documentation  Taken 8/2/2024 1841 by Adrianne Castro, RN  Pain Management Interventions:   medication (see MAR)   pillow support provided   repositioned  Taken 8/2/2024 1519 by Adrianne Castro, RN  Pain Management Interventions:   emotional support   repositioned

## 2024-08-04 LAB
CREAT SERPL-MCNC: 0.82 MG/DL (ref 0.51–0.95)
EGFRCR SERPLBLD CKD-EPI 2021: 72 ML/MIN/1.73M2
GLUCOSE BLDC GLUCOMTR-MCNC: 119 MG/DL (ref 70–99)
GLUCOSE BLDC GLUCOMTR-MCNC: 218 MG/DL (ref 70–99)
GLUCOSE BLDC GLUCOMTR-MCNC: 79 MG/DL (ref 70–99)
GLUCOSE BLDC GLUCOMTR-MCNC: 87 MG/DL (ref 70–99)
GLUCOSE BLDC GLUCOMTR-MCNC: 95 MG/DL (ref 70–99)
GLUCOSE BLDC GLUCOMTR-MCNC: 98 MG/DL (ref 70–99)
HOLD SPECIMEN: NORMAL
INR PPP: 2.67 (ref 0.85–1.15)

## 2024-08-04 PROCEDURE — 85610 PROTHROMBIN TIME: CPT | Performed by: INTERNAL MEDICINE

## 2024-08-04 PROCEDURE — 250N000013 HC RX MED GY IP 250 OP 250 PS 637: Performed by: STUDENT IN AN ORGANIZED HEALTH CARE EDUCATION/TRAINING PROGRAM

## 2024-08-04 PROCEDURE — 82565 ASSAY OF CREATININE: CPT | Performed by: INTERNAL MEDICINE

## 2024-08-04 PROCEDURE — 250N000013 HC RX MED GY IP 250 OP 250 PS 637: Performed by: INTERNAL MEDICINE

## 2024-08-04 PROCEDURE — 36415 COLL VENOUS BLD VENIPUNCTURE: CPT | Performed by: INTERNAL MEDICINE

## 2024-08-04 PROCEDURE — 99232 SBSQ HOSP IP/OBS MODERATE 35: CPT | Performed by: INTERNAL MEDICINE

## 2024-08-04 PROCEDURE — 120N000001 HC R&B MED SURG/OB

## 2024-08-04 RX ORDER — WARFARIN SODIUM 2 MG/1
2 TABLET ORAL
Status: COMPLETED | OUTPATIENT
Start: 2024-08-04 | End: 2024-08-04

## 2024-08-04 RX ADMIN — INSULIN ASPART 5 UNITS: 100 INJECTION, SOLUTION INTRAVENOUS; SUBCUTANEOUS at 09:00

## 2024-08-04 RX ADMIN — DOXYCYCLINE 100 MG: 100 CAPSULE ORAL at 08:57

## 2024-08-04 RX ADMIN — INSULIN DEGLUDEC 34 UNITS: 100 INJECTION, SOLUTION SUBCUTANEOUS at 09:00

## 2024-08-04 RX ADMIN — WARFARIN SODIUM 2 MG: 2 TABLET ORAL at 17:28

## 2024-08-04 RX ADMIN — POTASSIUM CHLORIDE 20 MEQ: 750 TABLET, EXTENDED RELEASE ORAL at 21:42

## 2024-08-04 RX ADMIN — MICONAZOLE NITRATE ANTIFUNGAL POWDER: 2 POWDER TOPICAL at 09:03

## 2024-08-04 RX ADMIN — TORSEMIDE 60 MG: 20 TABLET ORAL at 08:57

## 2024-08-04 RX ADMIN — ATORVASTATIN CALCIUM 40 MG: 40 TABLET, FILM COATED ORAL at 21:42

## 2024-08-04 RX ADMIN — METHADONE HYDROCHLORIDE 5 MG: 5 TABLET ORAL at 12:57

## 2024-08-04 RX ADMIN — INSULIN ASPART 3 UNITS: 100 INJECTION, SOLUTION INTRAVENOUS; SUBCUTANEOUS at 13:46

## 2024-08-04 RX ADMIN — POTASSIUM CHLORIDE 20 MEQ: 750 TABLET, EXTENDED RELEASE ORAL at 08:56

## 2024-08-04 RX ADMIN — METHADONE HYDROCHLORIDE 5 MG: 5 TABLET ORAL at 17:28

## 2024-08-04 RX ADMIN — INSULIN ASPART 1 UNITS: 100 INJECTION, SOLUTION INTRAVENOUS; SUBCUTANEOUS at 19:27

## 2024-08-04 RX ADMIN — THERA TABS 1 TABLET: TAB at 12:57

## 2024-08-04 RX ADMIN — METHADONE HYDROCHLORIDE 5 MG: 5 TABLET ORAL at 21:42

## 2024-08-04 RX ADMIN — METHADONE HYDROCHLORIDE 5 MG: 5 TABLET ORAL at 08:56

## 2024-08-04 RX ADMIN — LEVOFLOXACIN 500 MG: 500 TABLET, FILM COATED ORAL at 08:57

## 2024-08-04 RX ADMIN — MICONAZOLE NITRATE ANTIFUNGAL POWDER: 2 POWDER TOPICAL at 21:42

## 2024-08-04 RX ADMIN — DOXYCYCLINE 100 MG: 100 CAPSULE ORAL at 19:29

## 2024-08-04 ASSESSMENT — ACTIVITIES OF DAILY LIVING (ADL)
ADLS_ACUITY_SCORE: 39
ADLS_ACUITY_SCORE: 43
ADLS_ACUITY_SCORE: 39
ADLS_ACUITY_SCORE: 39
ADLS_ACUITY_SCORE: 43
ADLS_ACUITY_SCORE: 39
ADLS_ACUITY_SCORE: 43
ADLS_ACUITY_SCORE: 39
ADLS_ACUITY_SCORE: 43
ADLS_ACUITY_SCORE: 39
ADLS_ACUITY_SCORE: 43

## 2024-08-04 NOTE — PLAN OF CARE
Problem: Adult Inpatient Plan of Care  Goal: Optimal Comfort and Wellbeing  8/4/2024 1425 by Jerry Pang, RN  Outcome: Progressing  Problem: Comorbidity Management  Goal: Blood Glucose Levels Within Targeted Range  Outcome: Progressing  Problem: Pain Acute  Goal: Optimal Pain Control and Function  Outcome: Progressing     AO, VSS on RA. Generalized pain consistent at 5/10, managed with scheduled medications. RLE dressing intact with small amount of dried drainage, WDL. Purewick intact. Continues to be resistant to repositioning, education given. Prefers female staff for incontinence cares. No major changes from previous day shift. No acute events reported at this time.    Jerry Pang, RN

## 2024-08-04 NOTE — PLAN OF CARE
The patient needs encouragement and education with repositioning. She is resistant to most recommendations with turning and repositioning. At 21:00 she log rolled to receive sreekanth care and a Brief and purwick change. Mepilex boarder foam removed to allow skin to dry. Skin was moist and had an incontinent stool. She did decide to get out of bed into a wheelchair at that time. Dressing on right leg is clean, dry, and intact.      Goal Outcome Evaluation:  Problem: Skin Injury Risk Increased  Goal: Skin Health and Integrity  Outcome: Progressing  Intervention: Plan: Nurse Driven Intervention: Moisture Management  Recent Flowsheet Documentation  Taken 8/3/2024 1725 by Adrianne Castro RN  Moisture Interventions: Incontinence pad  Intervention: Plan: Nurse Driven Intervention: Friction and Shear  Recent Flowsheet Documentation  Taken 8/3/2024 1725 by Adrianne Castro RN  Friction/Shear Interventions: Silicone foam sacral dressing  Intervention: Optimize Skin Protection  Recent Flowsheet Documentation  Taken 8/3/2024 2100 by Adrianne Castro RN  Activity Management: (up to wheelchair)   activity adjusted per tolerance   activity encouraged   other (see comments)  Taken 8/3/2024 1725 by Adrianne Castro RN  Pressure Reduction Techniques: (pt refuses)   frequent weight shift encouraged   heels elevated off bed  Pressure Reduction Devices: positioning supports utilized  Skin Protection: adhesive use limited  Activity Management: (pt refuses repositioning)   activity adjusted per tolerance   activity encouraged   patient refuses activity  Head of Bed (HOB) Positioning: HOB at 30 degrees  Taken 8/3/2024 1621 by Adrianne Castro RN  Head of Bed (HOB) Positioning: HOB at 30 degrees

## 2024-08-04 NOTE — PROGRESS NOTES
Seeking TCU, referrals pending. Call from patient asking for update about referrals. SW reviewed facilities and shared weekend delay / referral updates with her. Pt expressed disappointment. SW provided emotional support and validation experienced due to delay. Informed patient that screenings will continue again on Monday. Pt expressed understanding.

## 2024-08-04 NOTE — PLAN OF CARE
Problem: Infection  Goal: Absence of Infection Signs and Symptoms  Outcome: Progressing  Intervention: Prevent or Manage Infection  Recent Flowsheet Documentation  Taken 8/4/2024 0030 by Jose Juan Chavis RN  Isolation Precautions: contact precautions maintained     Problem: Chronic Kidney Disease  Goal: Acceptable Pain Control  Intervention: Prevent or Manage Pain  Recent Flowsheet Documentation  Taken 8/4/2024 0221 by Jose Juan Chavis, RN  Pain Management Interventions: declines   Goal Outcome Evaluation:    Right leg and coccyx dressings are clean, dry and intact. Pt stated that her pain is tolerable and declined pain meds. Pt stated that prefers female nurses and aides. Afebrile.

## 2024-08-04 NOTE — PROGRESS NOTES
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 MRI neg for osteo RLE.      RLE wound infection  Pressure ulcer, coccyx   -Apparently getting worse since the discharge from [facility] (7/16/24).   -CRP, ESR elevated,  Normal procal  -MRI RLE neg for osteomyelitis  -WOC following  -Pain control  -Appreciate ID, signed off now.  -Was on IV vancomycin and meropenem, but lost IV. Soo PARIKH changed to po levaquin and doxycycline based on ID recommendation. Will stop iv abx.  -Pt somewhat non compliant, will need on going education.      Acute on chronic pain  -On methadone 5mg 4 times daily.   -Prn dilaudid also ordered.      DM-II  -Uncontrolled with A1c of 9.9  -Cont home Tresiba, sliding scale insulin with meals/bedtime, 1:10 CHO ratio added.      Paroxysmal afib  -Rate controlled  -Cont coumadin, pharmacy to dose.      Other chronic issues  Hyperlipidemia  Hypertension  Chronic right sided heart failure  Chronic normocytic anemia  CKD-III  Untreated BERLIN  Obesity  Fibromyalgia  - Stable. Cont home meds          Diet: Combination Diet Regular Diet Adult; Moderate Consistent Carb (60 g CHO per Meal) Diet  Diet    DVT Prophylaxis: Warfarin  Braga Catheter: Not present  Lines: None     Cardiac Monitoring: None  Code Status: Full Code      Clinically Significant Risk Factors              # Hypoalbuminemia: Lowest albumin = 2.5 g/dL at 7/29/2024  7:32 AM, will monitor as appropriate     # Hypertension: Noted on problem list           # DMII: A1C = N/A within past 6 months   # Obesity: Estimated body mass index is 37.12 kg/m  as calculated from the following:    Height as of  "7/24/24: 1.676 m (5' 6\").    Weight as of this encounter: 104.3 kg (230 lb).      # Financial/Environmental Concerns:           Disposition Plan     Medically Ready for Discharge: Ready Now    Await TCU         Josi Overton MD  Hospitalist Service  [facility]  Securely message with [employer] (more info)  Text page via [employer] Paging/Directory   ______________________________________________________________________    Interval History   Eager to go to [facility], no new complaints    Physical Exam   Vital Signs: Temp: 97.8  F (36.6  C) Temp src: Oral BP: (!) 145/72 Pulse: 88   Resp: 18 SpO2: 92 % O2 Device: None (Room air)    Weight: 230 lbs 0 oz    General.  Awake alert   Neck supple no JVD.  CVS regular rhythm no murmur gallops.  Lungs.  Clear to auscultation bilateral no wheezing or rales.  Abdomen.  Soft nontender bowel sounds present.  Extremities.  S/p left tka, right leg in dressing  Neurological.  Awake and alert. No focal deficit.  Skin no rash. No pallor.  Psych. Normal mood.      Medical Decision Making       36 MINUTES SPENT BY ME on the date of service doing chart review, history, exam, documentation & further activities per the note.      Data     I have personally reviewed the following data over the past 24 hrs:    N/A  \   N/A   / N/A     N/A N/A N/A /  87   N/A N/A 0.82 \     INR:  2.67 (H) PTT:  N/A   D-dimer:  N/A Fibrinogen:  N/A       Imaging results reviewed over the past 24 hrs:   No results found for this or any previous visit (from the past 24 hour(s)).    "

## 2024-08-04 NOTE — PROGRESS NOTES
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  MRI negative for osteo RLE.  Noted pressure ulcer-coccyx.  Has acute on chronic pain-on methadone 5 mg 4 times daily    NUTRITION HISTORY  Pt states she does not follow any special diet at home ( she also declined education on consistent CHO diet).  She states she's always been a good protein eater and likes katz, turkey, chicken.  Not a big fan of eggs. She states she's mostly eating 75%+ of meals here    CURRENT NUTRITION ORDERS  Diet: Consistent CHO ( 60 g CHO with each meal)  Intake/Tolerance: eating % meals 8/1-8/3 ( mostly %)    LABS  Labs reviewed: Cr 0.82, Glu 79, 87    MEDICATIONS  Medications reviewed: lipitor, monodox, novalog, insulin degludec, levaquin, methadone, multi vitamin, potassium chloride, demadex, warfarin    ANTHROPOMETRICS  Height: 5'6\"  Most Recent Weight: 104.3 kg (230 lb) (historical)    IBW: 55.45 kg  BMI: Obesity Grade II BMI 35-39.9  Weight History:   Wt Readings from Last 10 Encounters:   07/28/24 104.3 kg (230 lb)   07/24/24 104.3 kg (230 lb)   07/23/24 104.5 kg (230 lb 6.4 oz)   07/22/24 104.5 kg (230 lb 6.4 oz)   07/18/24 99.4 kg (219 lb 3.2 oz)   07/17/24 99.4 kg (219 lb 3.2 oz)   07/11/24 99.6 kg (219 lb 9.3 oz)   07/08/24 99 kg (218 lb 3.2 oz)   07/03/24 101.2 kg (223 lb) "   06/24/24 102.9 kg (226 lb 12.8 oz)     Dosing Weight: 67.7 kg/ adjusted weight    ASSESSED NUTRITION NEEDS  Estimated Energy Needs: 6517-9433 kcals/day (25 - 30 kcals/kg)  Justification: Maintenance and Obese  Estimated Protein Needs:  grams protein/day (1.2 - 1.5 grams of pro/kg)  Justification: Wound healing  Estimated Fluid Needs: 2031 mL/day (30 ml/Kg)   Justification: Maintenance and Per provider pending fluid status    PHYSICAL FINDINGS  See malnutrition section below.  Skin: Redness/blanchable  Abrasion/excoriation Buttocks/IT  Scab on left knee  RLE wound  Suresh score=17, nutrition noted as adequate  GI: Gas discomfort/ constipation  Last BM 8/1/2024    MALNUTRITION:  % Weight Loss:  None noted  % Intake:  No decreased intake noted  Subcutaneous Fat Loss:  None observed  Muscle Loss:  None observed  Fluid Retention:  Trace to mild 1+ to 2+ ( left arm, wrist and hand and right leg,ankle and foot)    Malnutrition Diagnosis: Patient does not meet two of the above criteria necessary for diagnosing malnutrition    NUTRITION DIAGNOSIS  Increased nutrient needs  related to wounds as evidenced by need for increased protein for wound healing      INTERVENTIONS  Implementation  Nutrition Education: Patient declined   Medical food supplement therapy -expedite daily    Goals  Patient to consume % of nutritionally adequate meals three times per day, or the equivalent with supplements/snacks.  Wound healing     Monitoring/Evaluation  Progress toward goals will be monitored and evaluated per protocol.

## 2024-08-05 ENCOUNTER — APPOINTMENT (OUTPATIENT)
Dept: OCCUPATIONAL THERAPY | Facility: HOSPITAL | Age: 79
DRG: 603 | End: 2024-08-05
Payer: COMMERCIAL

## 2024-08-05 LAB
CREAT SERPL-MCNC: 0.95 MG/DL (ref 0.51–0.95)
EGFRCR SERPLBLD CKD-EPI 2021: 61 ML/MIN/1.73M2
GLUCOSE BLDC GLUCOMTR-MCNC: 103 MG/DL (ref 70–99)
GLUCOSE BLDC GLUCOMTR-MCNC: 112 MG/DL (ref 70–99)
GLUCOSE BLDC GLUCOMTR-MCNC: 82 MG/DL (ref 70–99)
GLUCOSE BLDC GLUCOMTR-MCNC: 90 MG/DL (ref 70–99)
HOLD SPECIMEN: NORMAL
INR PPP: 3.18 (ref 0.85–1.15)

## 2024-08-05 PROCEDURE — 250N000013 HC RX MED GY IP 250 OP 250 PS 637: Performed by: INTERNAL MEDICINE

## 2024-08-05 PROCEDURE — 97110 THERAPEUTIC EXERCISES: CPT | Mod: GO

## 2024-08-05 PROCEDURE — 82565 ASSAY OF CREATININE: CPT | Performed by: INTERNAL MEDICINE

## 2024-08-05 PROCEDURE — 85610 PROTHROMBIN TIME: CPT | Performed by: INTERNAL MEDICINE

## 2024-08-05 PROCEDURE — 99207 PR NO BILLABLE SERVICE THIS VISIT: CPT | Performed by: INTERNAL MEDICINE

## 2024-08-05 PROCEDURE — 36415 COLL VENOUS BLD VENIPUNCTURE: CPT | Performed by: INTERNAL MEDICINE

## 2024-08-05 PROCEDURE — 120N000001 HC R&B MED SURG/OB

## 2024-08-05 PROCEDURE — G0463 HOSPITAL OUTPT CLINIC VISIT: HCPCS

## 2024-08-05 PROCEDURE — 250N000013 HC RX MED GY IP 250 OP 250 PS 637: Performed by: STUDENT IN AN ORGANIZED HEALTH CARE EDUCATION/TRAINING PROGRAM

## 2024-08-05 RX ORDER — DOXYCYCLINE 100 MG/1
100 CAPSULE ORAL EVERY 12 HOURS
DISCHARGE
Start: 2024-08-05 | End: 2024-08-10

## 2024-08-05 RX ADMIN — MICONAZOLE NITRATE ANTIFUNGAL POWDER: 2 POWDER TOPICAL at 09:08

## 2024-08-05 RX ADMIN — INSULIN ASPART 5 UNITS: 100 INJECTION, SOLUTION INTRAVENOUS; SUBCUTANEOUS at 13:07

## 2024-08-05 RX ADMIN — TORSEMIDE 60 MG: 20 TABLET ORAL at 09:09

## 2024-08-05 RX ADMIN — THERA TABS 1 TABLET: TAB at 13:07

## 2024-08-05 RX ADMIN — INSULIN ASPART 3 UNITS: 100 INJECTION, SOLUTION INTRAVENOUS; SUBCUTANEOUS at 09:11

## 2024-08-05 RX ADMIN — DOXYCYCLINE 100 MG: 100 CAPSULE ORAL at 09:09

## 2024-08-05 RX ADMIN — HYDROMORPHONE HYDROCHLORIDE 2 MG: 2 TABLET ORAL at 10:11

## 2024-08-05 RX ADMIN — LEVOFLOXACIN 500 MG: 500 TABLET, FILM COATED ORAL at 09:09

## 2024-08-05 RX ADMIN — ATORVASTATIN CALCIUM 40 MG: 40 TABLET, FILM COATED ORAL at 21:40

## 2024-08-05 RX ADMIN — METHADONE HYDROCHLORIDE 5 MG: 5 TABLET ORAL at 13:07

## 2024-08-05 RX ADMIN — POTASSIUM CHLORIDE 20 MEQ: 750 TABLET, EXTENDED RELEASE ORAL at 21:40

## 2024-08-05 RX ADMIN — MICONAZOLE NITRATE ANTIFUNGAL POWDER: 2 POWDER TOPICAL at 21:46

## 2024-08-05 RX ADMIN — Medication 60 ML: at 09:10

## 2024-08-05 RX ADMIN — INSULIN DEGLUDEC 34 UNITS: 100 INJECTION, SOLUTION SUBCUTANEOUS at 09:13

## 2024-08-05 RX ADMIN — METHADONE HYDROCHLORIDE 5 MG: 5 TABLET ORAL at 09:08

## 2024-08-05 RX ADMIN — METHADONE HYDROCHLORIDE 5 MG: 5 TABLET ORAL at 21:39

## 2024-08-05 RX ADMIN — POTASSIUM CHLORIDE 20 MEQ: 750 TABLET, EXTENDED RELEASE ORAL at 09:08

## 2024-08-05 RX ADMIN — INSULIN ASPART 6 UNITS: 100 INJECTION, SOLUTION INTRAVENOUS; SUBCUTANEOUS at 18:41

## 2024-08-05 RX ADMIN — METHADONE HYDROCHLORIDE 5 MG: 5 TABLET ORAL at 16:27

## 2024-08-05 RX ADMIN — DOXYCYCLINE 100 MG: 100 CAPSULE ORAL at 21:39

## 2024-08-05 ASSESSMENT — ACTIVITIES OF DAILY LIVING (ADL)
ADLS_ACUITY_SCORE: 50
ADLS_ACUITY_SCORE: 42
ADLS_ACUITY_SCORE: 43
ADLS_ACUITY_SCORE: 42
ADLS_ACUITY_SCORE: 43
ADLS_ACUITY_SCORE: 43
ADLS_ACUITY_SCORE: 42
ADLS_ACUITY_SCORE: 42
ADLS_ACUITY_SCORE: 43
ADLS_ACUITY_SCORE: 42
ADLS_ACUITY_SCORE: 50
ADLS_ACUITY_SCORE: 42
ADLS_ACUITY_SCORE: 43
ADLS_ACUITY_SCORE: 42
ADLS_ACUITY_SCORE: 42
ADLS_ACUITY_SCORE: 43
ADLS_ACUITY_SCORE: 42

## 2024-08-05 NOTE — PLAN OF CARE
Problem: Adult Inpatient Plan of Care  Goal: Optimal Comfort and Wellbeing  Intervention: Monitor Pain and Promote Comfort  Recent Flowsheet Documentation  Taken 8/5/2024 1400 by Miguel Fong RN  Pain Management Interventions:   medication (see MAR)   care clustered   distraction   emotional support   pillow support provided   quiet environment facilitated   relaxation techniques promoted   repositioned   rest  Taken 8/5/2024 1300 by Miguel Fong, RN  Pain Management Interventions:   medication (see MAR)   care clustered   distraction   emotional support   pillow support provided   quiet environment facilitated   relaxation techniques promoted   repositioned   rest  Taken 8/5/2024 1100 by Miguel Fong RN  Pain Management Interventions:   medication (see MAR)   care clustered   distraction   emotional support   pillow support provided   quiet environment facilitated   relaxation techniques promoted   repositioned   rest  Taken 8/5/2024 1000 by Miguel Fong RN  Pain Management Interventions:   medication (see MAR)   care clustered   distraction   emotional support   pillow support provided   quiet environment facilitated   relaxation techniques promoted   repositioned   rest   premedicated for activity  Taken 8/5/2024 0900 by Miguel Fong, RN  Pain Management Interventions:   medication (see MAR)   care clustered   distraction   emotional support   pillow support provided   quiet environment facilitated   relaxation techniques promoted   repositioned   rest     Problem: Adult Inpatient Plan of Care  Goal: Absence of Hospital-Acquired Illness or Injury  Intervention: Prevent Skin Injury  Recent Flowsheet Documentation  Taken 8/5/2024 0900 by Miguel Fong RN  Body Position:   refuses positioning   weight shifting  Device Skin Pressure Protection: absorbent pad utilized/changed   Goal Outcome Evaluation:       VSS on RA. A&Ox4. Refusing repo, educated on pressure injury risk. Wound care on RLE & sacrum done per  WOC, premedicated w/ PRN PO dilaudid. Denies pain at rest.

## 2024-08-05 NOTE — PLAN OF CARE
Problem: Adult Inpatient Plan of Care  Goal: Absence of Hospital-Acquired Illness or Injury  Outcome: Progressing  Intervention: Prevent Skin Injury  Recent Flowsheet Documentation  Taken 8/5/2024 0421 by Tyler Pham RN  Body Position: refuses positioning  Taken 8/5/2024 0221 by Tyler Pham RN  Body Position: sitting up in bed  Taken 8/5/2024 0017 by Tyler Pham RN  Body Position: sitting up in bed  Intervention: Prevent and Manage VTE (Venous Thromboembolism) Risk  Recent Flowsheet Documentation  Taken 8/5/2024 0017 by Tyler Pham RN  VTE Prevention/Management: patient refused intervention  Goal: Optimal Comfort and Wellbeing  Outcome: Progressing  Intervention: Monitor Pain and Promote Comfort  Recent Flowsheet Documentation  Taken 8/5/2024 0017 by Tyler Pham RN  Pain Management Interventions:   relaxation techniques promoted   repositioned   rest  Goal: Readiness for Transition of Care  Outcome: Progressing     Problem: Risk for Delirium  Goal: Improved Behavioral Control  Outcome: Progressing  Goal: Improved Attention and Thought Clarity  Outcome: Progressing     Problem: Comorbidity Management  Goal: Blood Glucose Levels Within Targeted Range  Outcome: Progressing  Goal: Blood Pressure in Desired Range  Outcome: Progressing     Problem: Skin Injury Risk Increased  Goal: Skin Health and Integrity  Outcome: Progressing  Intervention: Plan: Nurse Driven Intervention: Moisture Management  Recent Flowsheet Documentation  Taken 8/5/2024 0017 by Tyler Pham RN  Moisture Interventions:   Urinary collection device   No brief in bed  Intervention: Plan: Nurse Driven Intervention: Friction and Shear  Recent Flowsheet Documentation  Taken 8/5/2024 0017 by Tyler Pham RN  Friction/Shear Interventions:   HOB 30 degrees or less   Silicone foam sacral dressing  Intervention: Optimize Skin Protection  Recent Flowsheet Documentation  Taken 8/5/2024 0421 by Ankit  Tyler DELGADILLO RN  Head of Bed (HOB) Positioning: HOB at 30-45 degrees  Taken 8/5/2024 0221 by Tyler Pham RN  Head of Bed (HOB) Positioning: HOB at 30-45 degrees  Taken 8/5/2024 0017 by Tyler Pham RN  Activity Management: activity adjusted per tolerance  Head of Bed (HOB) Positioning: HOB at 30-45 degrees     Problem: Pain Acute  Goal: Optimal Pain Control and Function  Outcome: Progressing  Intervention: Develop Pain Management Plan  Recent Flowsheet Documentation  Taken 8/5/2024 0017 by Tyler Pham RN  Pain Management Interventions:   relaxation techniques promoted   repositioned   rest     Problem: Chronic Kidney Disease  Goal: Optimal Coping with Chronic Illness  Outcome: Progressing  Goal: Electrolyte Balance  Outcome: Progressing  Goal: Fluid Balance  Outcome: Progressing  Goal: Optimal Functional Ability  Outcome: Progressing  Intervention: Optimize Functional Ability  Recent Flowsheet Documentation  Taken 8/5/2024 0017 by Tyler Pham RN  Activity Management: activity adjusted per tolerance     Problem: Infection  Goal: Absence of Infection Signs and Symptoms  Outcome: Progressing   Goal Outcome Evaluation: pt is alert and oriented. C/o generalized pain. Right leg wound has CDI dressing. No complaints about left leg pain. Purewick in place. Bgs 218.

## 2024-08-05 NOTE — PLAN OF CARE
The patient was cooperative with cares this evening. Repositioned and allowed sreekanth care. Mepilex bordered foam changed on coccyx/buttocks and thigh. Purwick and bedding changed. She is anxiously waiting for discharge.      Goal Outcome Evaluation:    Problem: Adult Inpatient Plan of Care  Goal: Optimal Comfort and Wellbeing  Outcome: Progressing  Intervention: Monitor Pain and Promote Comfort  Recent Flowsheet Documentation  Taken 8/4/2024 2142 by Adrianne Castro, RN  Pain Management Interventions:   medication (see MAR)   environmental changes   emotional support   repositioned   pillow support provided  Taken 8/4/2024 1728 by Adrianne Castro, RN  Pain Management Interventions:   medication (see MAR)   emotional support   pillow support provided

## 2024-08-05 NOTE — PROGRESS NOTES
Care Management Follow Up    Length of Stay (days): 8    Expected Discharge Date: 08/05/2024     Concerns to be Addressed: discharge planning     Patient plan of care discussed at interdisciplinary rounds: Yes    Anticipated Discharge Disposition: Transitional Care     Anticipated Discharge Services: None  Anticipated Discharge DME: None    Patient/family educated on Medicare website which has current facility and service quality ratings: yes  Education Provided on the Discharge Plan: Yes  Patient/Family in Agreement with the Plan: yes    Referrals Placed by CM/SW: Post Acute Facilities  Private pay costs discussed: Not applicable    Additional Information:  10:50 AM   [facility]   YUDY called Trini in admissions at [facility]; Kristian is considering, Trini is waiting to hear back from them.   YUDY updated patient at bedside.     ROMULO Lucas

## 2024-08-05 NOTE — PROGRESS NOTES
SPIRITUAL HEALTH SERVICES - Progress Note  [facility] P4    Referral Source: Follow Up/Patient    Pt. Margaret continues to long for discharge from hospital and transition to further recovery. Margaret talked about her daughter and son-in-law visiting this past weekend, and ongoing conversations with friends as sources of encouragement. We said an Our Father and I prayed for Margaret's encouragement and healing.      Plan:   SHS will continue to follow up, offering prayer and reflective conversation during hospitalization.         No Steiner; [employer]. '25   Intern    SHS available 24/7 for emergent requests/referrals, either by paging the on-call  or by entering an ASAP/STAT consult in Epic (this will also page the on-call ).

## 2024-08-05 NOTE — PROGRESS NOTES
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     RLE wound infection  Pressure ulcer, coccyx   -Apparently getting worse since the discharge from [facility] (7/16/24). Patient is very irritable and unco-operative during my visit, and didn't let me examine the wound. Patient stated \"the ED doc looked at it\"; and is very painful  -Check CRP, ESR and procal  -WOC consult  - Cont IV vancomycin and meropenem started in ED. Consult ID. Recent MRI on 7/13/24 was negative for ostomyelitis. MRI ordered from ED. Result is pending    Assessment:    Wound location: RLE     Last photo: 7/29/24 RLE anterolatera               RLE RLE posterior       8/5 right anterior leg     Right lateral/posterior leg  Wound due to: Venous Ulcer  Wound history/plan of care: patient cannot tolerate compression due to significant pain which does not relent even after wraps had been in place  Wound base: 30%  granular , 70%  thinning slough       Palpation of the wound bed: normal      Drainage: moderate     Description of drainage: serosanguinous     Measurements (length x width x depth, in cm): No longer circumferential, largest area 22 x 18m      Tunneling: N/A     Undermining: N/A  Periwound skin: Edematous and scabbing      Color: pink      Temperature: normal   Odor: none  Pain: score 6/10  Pain interventions prior to dressing change: oral Hydromorphone given and slow and gentle cares   Treatment goal: Drainage control and Remove necrotic tissue  STATUS: improving  Supplies ordered: " gathered    R buttock/posterior thigh seen this AM at same time as RLE.     7/29      8/5  Scattered friction abrasions buttocks and moisture related denudement in gluteal cleft  Stop Mepilex and Start Triad paste    Treatment Plan:   RLE - Every other day (may do daily if exudate is draining through outer dressing)   Moisten wash cloth or towel with Vashe PS# [id number] and place over RLE from ankle to knee for 10 - 15 minutes.  Place Aquacel Ag PS# [id number] over all open areas.  Cut Exudry 24 x 36 PS# [id number]  in half and wrap around RLE - may use tape to secure in place or wrap roll gauze around the edge.  *When removing old dressing, ok to moisten Aquacel Ag with water or saline to ease removal of dressing.    R buttock/posterior thigh  Cleanse during sreekanth cares, pat dry  Apply a generous layer of TRIAD PASTE to cover entire area; BID and prn with stooling/cleansing  When cleansing, remove soiled paste only, no need to clean off all triad paste  Apply additional TRIAD to keep skin covered and protected    Orders: Updated    RECOMMEND PRIMARY TEAM ORDER: None, at this time  Education provided: plan of care  Discussed plan of care with: Patient  WOC nurse follow-up plan: weekly  Notify WOC if wound(s) deteriorate.  Nursing to notify the Provider(s) and re-consult the WOC Nurse if new skin concern.    DATA:     Current support surface: Standard  Standard Isoflex gel  Containment of urine/stool: Incontinence Protocol and Suction based external urinary catheter   BMI: Body mass index is 37.12 kg/m .   Active diet order: Orders Placed This Encounter      Combination Diet Regular Diet Adult; Moderate Consistent Carb (60 g CHO per Meal) Diet      Diet     Output: I/O last 3 completed shifts:  In: 800 [P.O.:800]  Out: 1350 [Urine:1350]     Labs:   Recent Labs   Lab 08/05/24  0552   INR 3.18*     Pressure injury risk assessment:   Sensory Perception: 3-->slightly limited  Moisture: 3-->occasionally moist  Activity:  2-->chairfast  Mobility: 3-->slightly limited  Nutrition: 3-->adequate  Friction and Shear: 1-->problem  Suresh Score: 15    JOHAN SethiN, RN, PHN, HNB-BC, CWOCN  Pager no longer is use, please contact through [employer] group: [facility] [employer]

## 2024-08-05 NOTE — PROGRESS NOTES
Chart reviewed. No acute event.    Patient is medically stable and ready for TCU placement. Await placement.

## 2024-08-06 LAB
GLUCOSE BLDC GLUCOMTR-MCNC: 101 MG/DL (ref 70–99)
GLUCOSE BLDC GLUCOMTR-MCNC: 106 MG/DL (ref 70–99)
GLUCOSE BLDC GLUCOMTR-MCNC: 132 MG/DL (ref 70–99)
GLUCOSE BLDC GLUCOMTR-MCNC: 149 MG/DL (ref 70–99)
GLUCOSE BLDC GLUCOMTR-MCNC: 163 MG/DL (ref 70–99)
GLUCOSE BLDC GLUCOMTR-MCNC: 84 MG/DL (ref 70–99)
INR PPP: 2.52 (ref 0.85–1.15)

## 2024-08-06 PROCEDURE — 36415 COLL VENOUS BLD VENIPUNCTURE: CPT | Performed by: INTERNAL MEDICINE

## 2024-08-06 PROCEDURE — 99232 SBSQ HOSP IP/OBS MODERATE 35: CPT | Performed by: INTERNAL MEDICINE

## 2024-08-06 PROCEDURE — 250N000011 HC RX IP 250 OP 636: Performed by: INTERNAL MEDICINE

## 2024-08-06 PROCEDURE — 120N000001 HC R&B MED SURG/OB

## 2024-08-06 PROCEDURE — 250N000013 HC RX MED GY IP 250 OP 250 PS 637: Performed by: INTERNAL MEDICINE

## 2024-08-06 PROCEDURE — 250N000013 HC RX MED GY IP 250 OP 250 PS 637: Performed by: STUDENT IN AN ORGANIZED HEALTH CARE EDUCATION/TRAINING PROGRAM

## 2024-08-06 PROCEDURE — 85610 PROTHROMBIN TIME: CPT | Performed by: INTERNAL MEDICINE

## 2024-08-06 RX ORDER — WARFARIN SODIUM 2 MG/1
2 TABLET ORAL
Status: COMPLETED | OUTPATIENT
Start: 2024-08-06 | End: 2024-08-06

## 2024-08-06 RX ADMIN — DOXYCYCLINE 100 MG: 100 CAPSULE ORAL at 08:31

## 2024-08-06 RX ADMIN — WARFARIN SODIUM 2 MG: 2 TABLET ORAL at 18:10

## 2024-08-06 RX ADMIN — INSULIN DEGLUDEC 34 UNITS: 100 INJECTION, SOLUTION SUBCUTANEOUS at 08:33

## 2024-08-06 RX ADMIN — METHADONE HYDROCHLORIDE 5 MG: 5 TABLET ORAL at 08:30

## 2024-08-06 RX ADMIN — METHADONE HYDROCHLORIDE 5 MG: 5 TABLET ORAL at 20:53

## 2024-08-06 RX ADMIN — METHADONE HYDROCHLORIDE 5 MG: 5 TABLET ORAL at 13:06

## 2024-08-06 RX ADMIN — TORSEMIDE 60 MG: 20 TABLET ORAL at 08:30

## 2024-08-06 RX ADMIN — LEVOFLOXACIN 500 MG: 500 TABLET, FILM COATED ORAL at 08:31

## 2024-08-06 RX ADMIN — ATORVASTATIN CALCIUM 40 MG: 40 TABLET, FILM COATED ORAL at 20:53

## 2024-08-06 RX ADMIN — THERA TABS 1 TABLET: TAB at 13:06

## 2024-08-06 RX ADMIN — MICONAZOLE NITRATE ANTIFUNGAL POWDER 1 APPLICATOR: 2 POWDER TOPICAL at 08:37

## 2024-08-06 RX ADMIN — DOXYCYCLINE 100 MG: 100 CAPSULE ORAL at 20:53

## 2024-08-06 RX ADMIN — ONDANSETRON 4 MG: 4 TABLET, ORALLY DISINTEGRATING ORAL at 09:40

## 2024-08-06 RX ADMIN — MICONAZOLE NITRATE ANTIFUNGAL POWDER: 2 POWDER TOPICAL at 20:55

## 2024-08-06 RX ADMIN — INSULIN ASPART 3 UNITS: 100 INJECTION, SOLUTION INTRAVENOUS; SUBCUTANEOUS at 14:27

## 2024-08-06 RX ADMIN — POTASSIUM CHLORIDE 20 MEQ: 750 TABLET, EXTENDED RELEASE ORAL at 20:53

## 2024-08-06 RX ADMIN — POTASSIUM CHLORIDE 20 MEQ: 750 TABLET, EXTENDED RELEASE ORAL at 08:31

## 2024-08-06 RX ADMIN — Medication 60 ML: at 08:32

## 2024-08-06 RX ADMIN — METHADONE HYDROCHLORIDE 5 MG: 5 TABLET ORAL at 16:54

## 2024-08-06 RX ADMIN — INSULIN ASPART 8 UNITS: 100 INJECTION, SOLUTION INTRAVENOUS; SUBCUTANEOUS at 18:12

## 2024-08-06 RX ADMIN — INSULIN ASPART 3 UNITS: 100 INJECTION, SOLUTION INTRAVENOUS; SUBCUTANEOUS at 09:52

## 2024-08-06 ASSESSMENT — ACTIVITIES OF DAILY LIVING (ADL)
ADLS_ACUITY_SCORE: 49
ADLS_ACUITY_SCORE: 50
ADLS_ACUITY_SCORE: 51
ADLS_ACUITY_SCORE: 50
ADLS_ACUITY_SCORE: 50
ADLS_ACUITY_SCORE: 49
ADLS_ACUITY_SCORE: 50
ADLS_ACUITY_SCORE: 50
ADLS_ACUITY_SCORE: 49
ADLS_ACUITY_SCORE: 45
ADLS_ACUITY_SCORE: 50
ADLS_ACUITY_SCORE: 50
ADLS_ACUITY_SCORE: 45
ADLS_ACUITY_SCORE: 51
ADLS_ACUITY_SCORE: 51
ADLS_ACUITY_SCORE: 49
ADLS_ACUITY_SCORE: 50
ADLS_ACUITY_SCORE: 51
ADLS_ACUITY_SCORE: 50
ADLS_ACUITY_SCORE: 51

## 2024-08-06 NOTE — PROGRESS NOTES
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 MRI neg for osteo RLE.     Medially ready await TCU placement.      RLE wound infection  Pressure ulcer, coccyx   -Apparently getting worse since the discharge from [facility] (7/16/24).   -CRP, ESR elevated,  Normal procal  -MRI RLE neg for osteomyelitis  -WOC following  -Pain control  -Appreciate ID, signed off now.  -Was on IV vancomycin and meropenem, but lost IV. Soo PARIKH changed to po levaquin and doxycycline based on ID recommendation. Stopped iv abx.  -Pt somewhat non compliant, will need on going education.      Acute on chronic pain  -On methadone 5mg 4 times daily.   -Prn dilaudid also ordered.      DM-II  -Uncontrolled with A1c of 9.9  -Cont home Tresiba, sliding scale insulin with meals/bedtime, 1:10 CHO ratio added.      Paroxysmal afib  -Rate controlled  -Cont coumadin, pharmacy to dose.      Other chronic issues  Hyperlipidemia  Hypertension  Chronic right sided heart failure  Chronic normocytic anemia  CKD-III  Untreated BERLIN  Obesity  Fibromyalgia  - Stable. Cont home meds          Diet: Combination Diet Regular Diet Adult; Moderate Consistent Carb (60 g CHO per Meal) Diet  Diet    DVT Prophylaxis: Warfarin  Braga Catheter: Not present  Lines: None     Cardiac Monitoring: None  Code Status: Full Code      Clinically Significant Risk Factors              # Hypoalbuminemia: Lowest albumin = 2.5 g/dL at 7/29/2024  7:32 AM, will monitor as appropriate     # Hypertension: Noted on problem list           # DMII: A1C = N/A within past 6 months   # Obesity: Estimated body mass index is 37.12 kg/m  as  "calculated from the following:    Height as of 7/24/24: 1.676 m (5' 6\").    Weight as of this encounter: 104.3 kg (230 lb).      # Financial/Environmental Concerns:           Disposition Plan     Medically Ready for Discharge: Ready Now    Await TCU placement         Josi Overton MD  Hospitalist Service  [facility]  Securely message with AIT (more info)  Text page via [facility] [employer] Paging/Directory   ______________________________________________________________________    Interval History   Same pain as before; eager to go to TCU not happy waiting for so long. Otherwise no new complaints. No fever, no cp/sob, no n/v    Physical Exam   Vital Signs: Temp: 97.7  F (36.5  C) Temp src: Oral BP: (!) 140/69 Pulse: 107   Resp: 19 SpO2: 99 % O2 Device: None (Room air)    Weight: 230 lbs 0 oz    General.  Awake alert oriented not in acute distress.  HEENT.  Pupils equal round react to light, anicteric, EOM intact.  Neck supple no JVD.  CVS regular rhythm no murmur gallops.  Lungs.  Clear to auscultation bilateral no wheezing or rales.  Abdomen.  Soft nontender bowel sounds present.  Extremities.  S/p left BKA, right leg in dressing.  Neurological.   No focal deficit.  Skin no rash. No pallor.  Psych. Normal mood.      Medical Decision Making       30 MINUTES SPENT BY ME on the date of service doing chart review, history, exam, documentation & further activities per the note.      Data     I have personally reviewed the following data over the past 24 hrs:    INR:  2.52 (H) PTT:  N/A   D-dimer:  N/A Fibrinogen:  N/A       Imaging results reviewed over the past 24 hrs:   No results found for this or any previous visit (from the past 24 hour(s)).    "

## 2024-08-06 NOTE — PLAN OF CARE
"  Problem: Adult Inpatient Plan of Care  Goal: Patient-Specific Goal (Individualized)  Description: You can add care plan individualizations to a care plan. Examples of Individualization might be:  \"Parent requests to be called daily at 9am for status\", \"I have a hard time hearing out of my right ear\", or \"Do not touch me to wake me up as it startles  me\".  Outcome: Progressing  Goal: Absence of Hospital-Acquired Illness or Injury  Outcome: Progressing  Intervention: Prevent Skin Injury  Recent Flowsheet Documentation  Taken 8/5/2024 1621 by Mervat Park RN  Body Position: refuses positioning  Intervention: Prevent Infection  Recent Flowsheet Documentation  Taken 8/5/2024 1700 by Mervat Park RN  Infection Prevention: hand hygiene promoted  Goal: Optimal Comfort and Wellbeing  Outcome: Progressing  Intervention: Monitor Pain and Promote Comfort  Recent Flowsheet Documentation  Taken 8/5/2024 1700 by Mervat Park RN  Pain Management Interventions:   medication (see MAR)   care clustered   distraction   emotional support   pillow support provided   quiet environment facilitated   relaxation techniques promoted   repositioned   rest  Goal: Readiness for Transition of Care  Outcome: Progressing     Problem: Risk for Delirium  Goal: Optimal Coping  Outcome: Progressing  Goal: Improved Behavioral Control  Outcome: Progressing  Intervention: Minimize Safety Risk  Recent Flowsheet Documentation  Taken 8/5/2024 1700 by Mervat Park RN  Communication Enhancement Strategies: call light answered in person  Goal: Improved Attention and Thought Clarity  Outcome: Progressing  Intervention: Maximize Cognitive Function  Recent Flowsheet Documentation  Taken 8/5/2024 1700 by Mervat Park RN  Sensory Stimulation Regulation: television on  Reorientation Measures: clock in view  Goal: Improved Sleep  Outcome: Progressing     Problem: Comorbidity Management  Goal: Blood Glucose Levels Within Targeted Range  Outcome: " Progressing  Goal: Blood Pressure in Desired Range  Outcome: Progressing  Goal: Maintenance of Heart Failure Symptom Control  Outcome: Progressing     Problem: Skin Injury Risk Increased  Goal: Skin Health and Integrity  Outcome: Progressing  Intervention: Plan: Nurse Driven Intervention: Moisture Management  Recent Flowsheet Documentation  Taken 8/5/2024 2000 by Mervat Park RN  Moisture Interventions:   Incontinence pad   Urinary collection device  Bathing/Skin Care: incontinence care  Taken 8/5/2024 1700 by Mervat Park RN  Moisture Interventions: Urinary collection device  Intervention: Plan: Nurse Driven Intervention: Friction and Shear  Recent Flowsheet Documentation  Taken 8/5/2024 1700 by Mervat Park RN  Friction/Shear Interventions: HOB 30 degrees or less  Intervention: Optimize Skin Protection  Recent Flowsheet Documentation  Taken 8/5/2024 2021 by Mervat Park RN  Head of Bed (HOB) Positioning: HOB at 30 degrees  Taken 8/5/2024 1821 by Mervat Park RN  Head of Bed (HOB) Positioning: HOB at 30 degrees  Intervention: Promote and Optimize Oral Intake  Recent Flowsheet Documentation  Taken 8/5/2024 1700 by Mervat Park RN  Nutrition Interventions: supplemental drinks provided     Problem: Dysrhythmia  Goal: Normalized Cardiac Rhythm  Outcome: Progressing     Problem: Pain Acute  Goal: Optimal Pain Control and Function  Outcome: Progressing  Intervention: Develop Pain Management Plan  Recent Flowsheet Documentation  Taken 8/5/2024 1700 by Mervat Park RN  Pain Management Interventions:   medication (see MAR)   care clustered   distraction   emotional support   pillow support provided   quiet environment facilitated   relaxation techniques promoted   repositioned   rest  Intervention: Prevent or Manage Pain  Recent Flowsheet Documentation  Taken 8/5/2024 1700 by Mervat Park RN  Sensory Stimulation Regulation: television on     Problem: Chronic Kidney Disease  Goal: Optimal  Coping with Chronic Illness  Outcome: Progressing  Goal: Electrolyte Balance  Outcome: Progressing  Goal: Fluid Balance  Outcome: Progressing  Goal: Optimal Functional Ability  Outcome: Progressing  Goal: Absence of Anemia Signs and Symptoms  Outcome: Progressing  Goal: Optimal Oral Intake  Outcome: Progressing  Goal: Acceptable Pain Control  Outcome: Progressing  Intervention: Prevent or Manage Pain  Recent Flowsheet Documentation  Taken 8/5/2024 1700 by Mervat Park RN  Pain Management Interventions:   medication (see MAR)   care clustered   distraction   emotional support   pillow support provided   quiet environment facilitated   relaxation techniques promoted   repositioned   rest  Goal: Minimize Renal Failure Effects  Outcome: Progressing  Intervention: Protect and Monitor Dialysis Access Site  Recent Flowsheet Documentation  Taken 8/5/2024 1700 by Mervat Park RN  Safety Precautions: side rails padded     Problem: Infection  Goal: Absence of Infection Signs and Symptoms  Outcome: Progressing  Intervention: Prevent or Manage Infection  Recent Flowsheet Documentation  Taken 8/5/2024 1700 by Mervat Park RN  Isolation Precautions: contact precautions maintained     Problem: Mobility Impairment  Goal: Optimal Mobility  Outcome: Progressing     Problem: Infection  Goal: Absence of Infection Signs and Symptoms  Outcome: Progressing  Intervention: Prevent or Manage Infection  Recent Flowsheet Documentation  Taken 8/5/2024 1700 by Mervat Park RN  Isolation Precautions: contact precautions maintained     Problem: Mobility Impairment  Goal: Optimal Mobility  Outcome: Progressing     Problem: Skin or Soft Tissue Infection  Goal: Absence of Infection Signs and Symptoms  Intervention: Minimize and Manage Infection Progression  Recent Flowsheet Documentation  Taken 8/5/2024 1700 by Mervat Park RN  Infection Prevention: hand hygiene promoted  Isolation Precautions: contact precautions maintained   Goal  Outcome Evaluation:       Pt alert & oriented X4, C/O generalized pain, control with current pain regiment. Wound to LLE dressin C/D/I and buttock wound dressing intact. BG 90 & 82, VSS on room air. Pt incompliant with turns and repositioning.

## 2024-08-06 NOTE — PLAN OF CARE
"  Problem: Adult Inpatient Plan of Care  Goal: Patient-Specific Goal (Individualized)  Description: You can add care plan individualizations to a care plan. Examples of Individualization might be:  \"Parent requests to be called daily at 9am for status\", \"I have a hard time hearing out of my right ear\", or \"Do not touch me to wake me up as it startles  me\".  Outcome: Progressing  Goal: Absence of Hospital-Acquired Illness or Injury  Intervention: Prevent Skin Injury  Recent Flowsheet Documentation  Taken 8/6/2024 0030 by Patricia Cary RN  Body Position: refuses positioning  Intervention: Prevent Infection  Recent Flowsheet Documentation  Taken 8/6/2024 0030 by Patricia Cary RN  Infection Prevention: hand hygiene promoted  Goal: Optimal Comfort and Wellbeing  Intervention: Monitor Pain and Promote Comfort  Recent Flowsheet Documentation  Taken 8/6/2024 0030 by Patricia Cary RN  Pain Management Interventions: medication offered but refused     Problem: Comorbidity Management  Goal: Blood Glucose Levels Within Targeted Range  Outcome: Progressing  Goal: Blood Pressure in Desired Range  Outcome: Progressing     Problem: Skin Injury Risk Increased  Goal: Skin Health and Integrity  Outcome: Progressing  Intervention: Plan: Nurse Driven Intervention: Moisture Management  Recent Flowsheet Documentation  Taken 8/6/2024 0030 by Patricia Cary RN  Moisture Interventions: Urinary collection device  Intervention: Plan: Nurse Driven Intervention: Friction and Shear  Recent Flowsheet Documentation  Taken 8/6/2024 0030 by Patricia Cary RN  Friction/Shear Interventions: HOB 30 degrees or less  Intervention: Optimize Skin Protection  Recent Flowsheet Documentation  Taken 8/6/2024 0030 by Patricia Cary RN  Activity Management: activity adjusted per tolerance  Head of Bed (HOB) Positioning: HOB at 20-30 degrees     Problem: Pain Acute  Goal: Optimal Pain Control and " Function  Outcome: Progressing  Intervention: Develop Pain Management Plan  Recent Flowsheet Documentation  Taken 8/6/2024 0030 by Patricia Cary RN  Pain Management Interventions: medication offered but refused     Problem: Infection  Goal: Absence of Infection Signs and Symptoms  Outcome: Progressing  Intervention: Prevent or Manage Infection  Recent Flowsheet Documentation  Taken 8/6/2024 0030 by Patricia Cary RN  Isolation Precautions: contact precautions maintained   Goal Outcome Evaluation:       Pt alert & oriented. Dressing Rt lower extremity, clean, dry & intact. Pain managed by sched methadone. Refuses repositioning. On room air, Vital Signs stable. Will continue to monitor.

## 2024-08-06 NOTE — PROGRESS NOTES
Care Management Follow Up    Length of Stay (days): 9    Expected Discharge Date: 08/06/2024    Anticipated Discharge Plan:  Transitional Care    Transportation: Anticipate medical transport    PT Recommendations: Transitional Care Facility  OT Recommendations:  Transitional Care Facility     Barriers to Discharge: placement    Prior Living Situation: assisted living with facility resident     Patient/Spokesperson Updated: Yes. Who? Pt's dtr Lu    Additional Information:  TCU referrals pending.    CM called and followed up on pending TCU referrals:    [facility] (left VM for admissions)  [facility] (Left VM for admissions)  [facility] (Reviewing, [facility] declined)  [place] (reviewing)     CM received call from pt's dtr Lu asking for update on discharge planning. CM provided update and let Lu know that CM will need to expand the search for TCU and send additional referrals, Lu is agreeable and states she will update pt.     3:07 PM  CM met with pt in room. Pt asked for referrals to be sent to facilities in [facility] (preferably not in [facility]).     Maria Isabel Stroud, JOHANW

## 2024-08-06 NOTE — PLAN OF CARE
"[id number]    Pt has chronic generalized pain.  Pain\"5\"  declined PRN pain meds.  Pain is controlled well with scheduled pain meds.  Woc not due today.  Po antibiotics.  Reposition every 2-3 hours. Pt does refuse at times to be turned. Pt can microshift herself.   BG QID  with carb counting.  SW is trying to find placement for TCU    Problem: Adult Inpatient Plan of Care  Goal: Absence of Hospital-Acquired Illness or Injury  Intervention: Identify and Manage Fall Risk  Recent Flowsheet Documentation  Taken 8/6/2024 0830 by Natalia Chapman RN  Safety Promotion/Fall Prevention: activity supervised  Intervention: Prevent Skin Injury  Recent Flowsheet Documentation  Taken 8/6/2024 0830 by Natalia Chapman RN  Body Position: (Pt can shift herself in bed. Staff does offer to reposition.) position changed independently  Skin Protection: adhesive use limited  Device Skin Pressure Protection: absorbent pad utilized/changed  Intervention: Prevent and Manage VTE (Venous Thromboembolism) Risk  Recent Flowsheet Documentation  Taken 8/6/2024 0830 by Natalia Chapman RN  VTE Prevention/Management: SCDs off (sequential compression devices)  Intervention: Prevent Infection  Recent Flowsheet Documentation  Taken 8/6/2024 0830 by Natalia Chapman RN  Infection Prevention: hand hygiene promoted     Problem: Pain Acute  Goal: Optimal Pain Control and Function  Outcome: Progressing  Intervention: Develop Pain Management Plan  Recent Flowsheet Documentation  Taken 8/6/2024 0830 by Natalia Chapman RN  Pain Management Interventions: (states schedule meds are enough for her pain) medication offered but refused  Intervention: Prevent or Manage Pain  Recent Flowsheet Documentation  Taken 8/6/2024 0830 by Natalia Chapman RN  Medication Review/Management: medications reviewed  Intervention: Optimize Psychosocial Wellbeing  Recent Flowsheet Documentation  Taken 8/6/2024 0830 by Natalia Chapman RN  Supportive Measures: active listening utilized   Goal " Outcome Evaluation:

## 2024-08-07 ENCOUNTER — DOCUMENTATION ONLY (OUTPATIENT)
Dept: ANTICOAGULATION | Facility: CLINIC | Age: 79
End: 2024-08-07
Payer: COMMERCIAL

## 2024-08-07 VITALS
BODY MASS INDEX: 37.12 KG/M2 | RESPIRATION RATE: 18 BRPM | SYSTOLIC BLOOD PRESSURE: 128 MMHG | TEMPERATURE: 98 F | DIASTOLIC BLOOD PRESSURE: 61 MMHG | HEART RATE: 69 BPM | WEIGHT: 230 LBS | OXYGEN SATURATION: 99 %

## 2024-08-07 DIAGNOSIS — I48.20 CHRONIC ATRIAL FIBRILLATION (H): Primary | Chronic | ICD-10-CM

## 2024-08-07 LAB
CREAT SERPL-MCNC: 0.97 MG/DL (ref 0.51–0.95)
EGFRCR SERPLBLD CKD-EPI 2021: 59 ML/MIN/1.73M2
GLUCOSE BLDC GLUCOMTR-MCNC: 107 MG/DL (ref 70–99)
GLUCOSE BLDC GLUCOMTR-MCNC: 108 MG/DL (ref 70–99)
GLUCOSE BLDC GLUCOMTR-MCNC: 120 MG/DL (ref 70–99)
GLUCOSE BLDC GLUCOMTR-MCNC: 81 MG/DL (ref 70–99)
HOLD SPECIMEN: NORMAL
INR PPP: 2.28 (ref 0.85–1.15)

## 2024-08-07 PROCEDURE — 99239 HOSP IP/OBS DSCHRG MGMT >30: CPT | Performed by: INTERNAL MEDICINE

## 2024-08-07 PROCEDURE — 250N000011 HC RX IP 250 OP 636: Performed by: INTERNAL MEDICINE

## 2024-08-07 PROCEDURE — 36415 COLL VENOUS BLD VENIPUNCTURE: CPT | Performed by: INTERNAL MEDICINE

## 2024-08-07 PROCEDURE — 82565 ASSAY OF CREATININE: CPT | Performed by: INTERNAL MEDICINE

## 2024-08-07 PROCEDURE — 250N000013 HC RX MED GY IP 250 OP 250 PS 637: Performed by: INTERNAL MEDICINE

## 2024-08-07 PROCEDURE — 85610 PROTHROMBIN TIME: CPT | Performed by: INTERNAL MEDICINE

## 2024-08-07 PROCEDURE — 250N000013 HC RX MED GY IP 250 OP 250 PS 637: Performed by: STUDENT IN AN ORGANIZED HEALTH CARE EDUCATION/TRAINING PROGRAM

## 2024-08-07 RX ORDER — METHADONE HYDROCHLORIDE 5 MG/1
5 TABLET ORAL 4 TIMES DAILY
Qty: 120 TABLET | Refills: 0 | Status: SHIPPED | OUTPATIENT
Start: 2024-08-07

## 2024-08-07 RX ORDER — METHADONE HYDROCHLORIDE 5 MG/1
5 TABLET ORAL 3 TIMES DAILY PRN
Qty: 30 TABLET | Refills: 0 | Status: SHIPPED | OUTPATIENT
Start: 2024-08-07

## 2024-08-07 RX ADMIN — DOXYCYCLINE 100 MG: 100 CAPSULE ORAL at 08:23

## 2024-08-07 RX ADMIN — MICONAZOLE NITRATE ANTIFUNGAL POWDER: 2 POWDER TOPICAL at 08:25

## 2024-08-07 RX ADMIN — THERA TABS 1 TABLET: TAB at 13:00

## 2024-08-07 RX ADMIN — INSULIN DEGLUDEC 34 UNITS: 100 INJECTION, SOLUTION SUBCUTANEOUS at 08:24

## 2024-08-07 RX ADMIN — POTASSIUM CHLORIDE 20 MEQ: 750 TABLET, EXTENDED RELEASE ORAL at 08:24

## 2024-08-07 RX ADMIN — LEVOFLOXACIN 500 MG: 500 TABLET, FILM COATED ORAL at 08:24

## 2024-08-07 RX ADMIN — METHADONE HYDROCHLORIDE 5 MG: 5 TABLET ORAL at 13:00

## 2024-08-07 RX ADMIN — ONDANSETRON 4 MG: 4 TABLET, ORALLY DISINTEGRATING ORAL at 09:46

## 2024-08-07 RX ADMIN — METHADONE HYDROCHLORIDE 5 MG: 5 TABLET ORAL at 08:23

## 2024-08-07 RX ADMIN — Medication 60 ML: at 08:23

## 2024-08-07 RX ADMIN — TORSEMIDE 60 MG: 20 TABLET ORAL at 08:24

## 2024-08-07 RX ADMIN — INSULIN ASPART 3 UNITS: 100 INJECTION, SOLUTION INTRAVENOUS; SUBCUTANEOUS at 09:26

## 2024-08-07 RX ADMIN — INSULIN ASPART 4 UNITS: 100 INJECTION, SOLUTION INTRAVENOUS; SUBCUTANEOUS at 13:01

## 2024-08-07 ASSESSMENT — ACTIVITIES OF DAILY LIVING (ADL)
ADLS_ACUITY_SCORE: 47
ADLS_ACUITY_SCORE: 45
ADLS_ACUITY_SCORE: 47
ADLS_ACUITY_SCORE: 48
ADLS_ACUITY_SCORE: 48
ADLS_ACUITY_SCORE: 45
ADLS_ACUITY_SCORE: 48

## 2024-08-07 NOTE — PLAN OF CARE
"  Problem: Adult Inpatient Plan of Care  Goal: Patient-Specific Goal (Individualized)  Description: You can add care plan individualizations to a care plan. Examples of Individualization might be:  \"Parent requests to be called daily at 9am for status\", \"I have a hard time hearing out of my right ear\", or \"Do not touch me to wake me up as it startles  me\".  Outcome: Progressing  Goal: Absence of Hospital-Acquired Illness or Injury  Intervention: Identify and Manage Fall Risk  Recent Flowsheet Documentation  Taken 8/7/2024 0030 by Patricia Cary RN  Safety Promotion/Fall Prevention:   safety round/check completed   clutter free environment maintained  Taken 8/6/2024 2000 by Patricia Cary RN  Safety Promotion/Fall Prevention:   safety round/check completed   clutter free environment maintained  Intervention: Prevent Skin Injury  Recent Flowsheet Documentation  Taken 8/7/2024 0030 by Patricia Cary RN  Body Position: position changed independently  Taken 8/6/2024 2000 by Patricia Cary RN  Body Position: position changed independently  Goal: Optimal Comfort and Wellbeing  Intervention: Monitor Pain and Promote Comfort  Recent Flowsheet Documentation  Taken 8/7/2024 0030 by Patricia Cary RN  Pain Management Interventions: medication offered but refused  Taken 8/6/2024 2053 by Patricia Cary RN  Pain Management Interventions: medication (see MAR)     Problem: Comorbidity Management  Goal: Blood Glucose Levels Within Targeted Range  Outcome: Progressing  Intervention: Monitor and Manage Glycemia  Recent Flowsheet Documentation  Taken 8/7/2024 0030 by Patricia Cary RN  Medication Review/Management: medications reviewed  Taken 8/6/2024 2000 by Patricia Cary RN  Medication Review/Management: medications reviewed  Goal: Blood Pressure in Desired Range  Outcome: Progressing  Intervention: Maintain Blood Pressure Management  Recent Flowsheet " Documentation  Taken 8/7/2024 0030 by Patricia Cary RN  Medication Review/Management: medications reviewed  Taken 8/6/2024 2000 by Patricia Cary RN  Medication Review/Management: medications reviewed  Goal: Maintenance of Heart Failure Symptom Control  Intervention: Maintain Heart Failure Management  Recent Flowsheet Documentation  Taken 8/7/2024 0030 by Patricia Cary RN  Medication Review/Management: medications reviewed  Taken 8/6/2024 2000 by Patricia Cary RN  Medication Review/Management: medications reviewed     Problem: Pain Acute  Goal: Optimal Pain Control and Function  Outcome: Progressing  Intervention: Develop Pain Management Plan  Recent Flowsheet Documentation  Taken 8/7/2024 0030 by Patricia Cary RN  Pain Management Interventions: medication offered but refused  Taken 8/6/2024 2053 by Patricia Cary RN  Pain Management Interventions: medication (see MAR)  Intervention: Prevent or Manage Pain  Recent Flowsheet Documentation  Taken 8/7/2024 0030 by Patricia Cary RN  Medication Review/Management: medications reviewed  Taken 8/6/2024 2000 by Patricia Cary RN  Medication Review/Management: medications reviewed   Goal Outcome Evaluation:       Pt alert & oriented. Dressing Rt lower extremity, clean, dry & intact. Pain managed by sched methadone. Refuses repositioning. On room air, Vital Signs stable. Will continue to monitor.

## 2024-08-07 NOTE — PROGRESS NOTES
Care Management Discharge Note    Discharge Date: 08/07/2024       Discharge Disposition: Transitional Care- [facility]    Discharge Services: None    Discharge DME: None    Discharge Transportation: [facility]; 1:22-2:07pm  window     Private pay costs discussed: transportation costs    Does the patient's insurance plan have a 3 day qualifying hospital stay waiver?  No    PAS Confirmation Code: [id number]  Patient/family educated on Medicare website which has current facility and service quality ratings: yes    Education Provided on the Discharge Plan: Yes  Persons Notified of Discharge Plans: patient, nursing, hospitalist   Patient/Family in Agreement with the Plan: yes    Handoff Referral Completed: Yes    Additional Information:  8:45 AM   SW received call from [facility] stating they can accept patient, but that they cannot do insulin based on carb count. SW discussed with patient, she accepts the bed and understands re: insulin. YUDY dsicussed transportation options and costs with patient. Patient agreeable to [facility] and costs. YUDY scheduled transport; 1:22-2:07  window. YUDY updated hospitalist and accepting facility.     Patient is discharging to [facility] via [facility] transport.     8:58 AM  SW attempted to notify patient's daughter. No answer and unable to leave .     9:15 AM   SW received return call from patient's daughter. Provided update. All parties understanding and agreeable.     ROMULO Lucas

## 2024-08-07 NOTE — PLAN OF CARE
Problem: Adult Inpatient Plan of Care  Goal: Readiness for Transition of Care  Outcome: Adequate for Care Transition   Goal Outcome Evaluation:       Patient discharging to transitional care. Patient aware of discharge plan. Wound care provided to lower extremities and buttocks. All personal belongings to be sent with patient.

## 2024-08-07 NOTE — PLAN OF CARE
Occupational Therapy Discharge Summary    Reason for therapy discharge:    Discharged to transitional care facility.    Progress towards therapy goal(s). See goals on Care Plan in [facility] electronic health record for goal details.  Goals partially met.  Barriers to achieving goals:   discharge from facility.    Therapy recommendation(s):    Recommend continued OT at TCU

## 2024-08-07 NOTE — DISCHARGE SUMMARY
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 MRI neg for osteo RLE.       RLE wound infection  Pressure ulcer, coccyx   -Apparently getting worse since the discharge from [facility] (7/16/24).   -CRP, ESR elevated, Normal procal  -MRI RLE neg for osteomyelitis  -WOC following  -Pain control  -Appreciate ID, signed off now.  -Was on IV vancomycin and meropenem, but lost IV. Soo PARIKH changed to po levaquin and doxycycline based on ID recommendation. Stopped iv abx.  -Pt with hx of non compliant, will need on going education.     Chronic wound needing care  RLE - Every other day (may do daily if exudate is draining through outer dressing)   Moisten wash cloth or towel with Vashe PS# [id number] and place over RLE from ankle to knee for 10 - 15 minutes.  Place Aquacel Ag PS# [id number] over all open areas.  Cut Exudry 24 x 36 PS# [id number]  in half and wrap around RLE - may use tape to secure in place or wrap roll gauze around the edge.  *When removing old dressing, ok to moisten Aquacel Ag with water or saline to ease removal of dressing.     R buttock/posterior thigh  Cleanse during sreekanth cares, pat dry  Apply a generous layer of TRIAD PASTE to cover entire area; BID and prn with stooling/cleansing  When cleansing, remove soiled paste only, no need to clean off all triad paste  Apply additional TRIAD to keep skin covered and protected    Acute on chronic pain  -On methadone 5mg 4 times daily.   -methadone 5 mg po tid prn breakthrough pain     DM-II  -Uncontrolled  "with A1c of 9.9  -Cont home Tresiba, sliding scale insulin with meals/bedtime  -consider to increase dose of Tresiba  -consider to add mealtime insulin      Paroxysmal afib  -Rate controlled  -Continue coumadin, recheck INR in 2 weeks     Other chronic issues  Hyperlipidemia  Hypertension  Chronic right sided heart failure  Chronic normocytic anemia  CKD-III  Untreated BERLIN  Obesity  Fibromyalgia  - Stable. Cont home meds       Clinically Significant Risk Factors     # DMII: A1C = N/A within past 6 months  # Obesity: Estimated body mass index is 37.12 kg/m  as calculated from the following:    Height as of 7/24/24: 1.676 m (5' 6\").    Weight as of this encounter: 104.3 kg (230 lb).       Follow-ups Needed After Discharge   Follow-up Appointments     Follow Up and recommended labs and tests      Follow up with Nursing home physician.  No follow up labs or test are   needed.  Follow up with specialist, ID in 1-2 weeks            Discharge Disposition   Discharged to rehabilitation facility  Condition at discharge: Stable    Hospital Course   See discharge summary    Consultations This Hospital Stay   PHARMACY TO DOSE VANCO  WOUND OSTOMY CONTINENCE NURSE  IP CONSULT  INFECTIOUS DISEASES IP CONSULT  PHARMACY TO DOSE WARFARIN  PHARMACY TO DOSE VANCO  WOUND OSTOMY CONTINENCE NURSE  IP CONSULT  CARE MANAGEMENT / SOCIAL WORK IP CONSULT  PHYSICAL THERAPY ADULT IP CONSULT  OCCUPATIONAL THERAPY ADULT IP CONSULT  LYMPHEDEMA THERAPY IP CONSULT  OCCUPATIONAL THERAPY ADULT IP CONSULT  PHYSICAL THERAPY ADULT IP CONSULT  SPIRITUAL HEALTH SERVICES IP CONSULT    Code Status   Full Code    Time Spent on this Encounter   I, Salazar Mancini MD, personally saw the patient today and spent greater than 30 minutes discharging this patient.       Salazar Mancini MD  [address] [address]  Phone: [phone]  Fax: " [phone]  ______________________________________________________________________    Physical Exam   Vital Signs: Temp: 98  F (36.7  C) Temp src: Oral BP: 128/61 Pulse: 69   Resp: 18 SpO2: 99 % O2 Device: None (Room air)    Weight: 230 lbs 0 oz    Vitals and nursing note reviewed.   Constitutional:  Well developed, obese elderly female, in no acute distress   HEENT:      Eyes: Extraocular movements intact, Conjunctivae clear without bleeding or jaundice.      Head: Normocephalic and atraumatic.      Right & left Ears: External ears normal.      Nares: without obstruction, bleeding or discharge.  Skin: Skin is warm and well perfused.   Pulmonary: Lungs clear to auscultation bilaterally  Cardiac: RRR, no clinically significant murmur  GI: Soft, NT, BS+  Right leg: chronic edema, kerlex wrap around right shin  Left leg: BKA  Neurological: Face is symmetric. Speech fluent, clear, appropriate. Oriented to self, time and place. Moves all extremities equally, 5/5 strength.        Primary Care Physician   [facility]    Discharge Orders      INR     General info for SNF    Length of Stay Estimate: Short Term Care: Estimated # of Days <30  Condition at Discharge: Stable  Level of care:skilled   Rehabilitation Potential: Good  Admission H&P remains valid and up-to-date: Yes  Recent Chemotherapy: N/A  Use Nursing Home Standing Orders: Yes     Follow Up and recommended labs and tests    Follow up with Nursing home physician.  No follow up labs or test are needed.  Follow up with specialist, ID in 1-2 weeks     Reason for your hospital stay    RLE cellulitis     Activity - Up ad karol     Physical Therapy Adult Consult    Evaluate and treat as clinically indicated.    Reason:  LE wounds, amputation     Occupational Therapy Adult Consult    Evaluate and treat as clinically indicated.    Reason:  RLE cellulitis, s/p LLE amputation     Contact Isolation     Fall precautions     Diet    Follow this diet upon  discharge: Orders Placed This Encounter      Combination Diet Regular Diet Adult; Moderate Consistent Carb (60 g CHO per Meal) Diet       Significant Results and Procedures   Most Recent 3 CBC's:  Recent Labs   Lab Test 07/29/24  0732 07/28/24  1527 07/24/24  0530   WBC 6.9 8.2 9.2   HGB 9.1* 9.5* 10.6*   MCV 92 91 93    235 301     Most Recent 3 BMP's:  Recent Labs   Lab Test 08/07/24  1148 08/07/24  0755 08/07/24  0625 08/07/24  0602 08/05/24  0805 08/05/24  0552 08/04/24  0745 08/04/24  0643 07/29/24  0801 07/29/24  0732 07/28/24  1926 07/28/24  1527 07/22/24  0510   NA  --   --   --   --   --   --   --   --   --  139  --  135 134*   POTASSIUM  --   --   --   --   --   --   --   --   --  3.7  --  3.7 5.1   CHLORIDE  --   --   --   --   --   --   --   --   --  98  --  93* 99   CO2  --   --   --   --   --   --   --   --   --  34*  --  33* 28   BUN  --   --   --   --   --   --   --   --   --  24.2*  --  30.3* 36.1*   CR  --   --   --  0.97*  --  0.95  --  0.82   < > 0.82  --  0.90 1.03*   ANIONGAP  --   --   --   --   --   --   --   --   --  7  --  9 7   ZAKIA  --   --   --   --   --   --   --   --   --  9.2  --  9.3 9.6   * 107* 81  --    < >  --    < >  --    < > 113*   < > 323* 144*    < > = values in this interval not displayed.     Most Recent 2 LFT's:  Recent Labs   Lab Test 07/29/24  0732 07/28/24  1527   AST 16 20   ALT 16 17   ALKPHOS 94 103   BILITOTAL 0.3 0.3   ,   Results for orders placed or performed during the hospital encounter of 07/28/24   MR Tibia Fibula Lower Leg Right w Contrast    Narrative    EXAM: MR TIBIA FIBULA LOWER LEG RIGHT W CONTRAST  LOCATION: [facility]  DATE: 7/30/2024    INDICATION: Large wound infection, rule out osteoarthritis.  COMPARISON: CT right lower extremity angiogram 7/14/2024.  TECHNIQUE: Routine. Additional postgadolinium T1 sequences were obtained.  IV CONTRAST: 10 ml gadavist    FINDINGS:     BONES:   -Anatomic alignment of the  tibia and fibula. No tibia or fibula fracture or MR finding for tibia or fibula acute osteomyelitis. No concerning bone lesion.     SOFT TISSUES:    -Moderate to severe multifocal right leg soft tissue edema with skin thickening and enhancement mainly mid to distal. Findings are compatible with cellulitis. I see no well-defined drainable subcutaneous abscess. The reported wound is less conspicuous on   imaging. Multiple muscle group muscle atrophy right leg particularly the gastrocnemius and to a lesser extent soleus musculature. Patchy nonspecific muscle edema particularly in the anterior and posterior compartments of the leg without significant   myositis. Neurovascular structures are grossly unremarkable on MRI. No full-thickness proximally retracted ankle tendon tear.      Impression    IMPRESSION:  1.  Moderate to severe multifocal right leg subcutaneous edema mainly mid to distal with skin thickening and enhancement compatible with cellulitis. Reported wound is less conspicuous on MRI. Correlate clinically.  2.  No well-defined drainable subcutaneous fluid collection to suggest abscess.  3.  No MR finding for right tibia or fibula acute osteomyelitis.       Discharge Medications   Current Discharge Medication List        START taking these medications    Details   doxycycline monohydrate (MONODOX) 100 MG capsule Take 1 capsule (100 mg) by mouth every 12 hours for 5 days    Associated Diagnoses: Ulcer of right lower extremity with fat layer exposed (H)      wound support modular (EXPEDITE) LIQD bottle Take 60 mLs by mouth daily    Associated Diagnoses: Ulcer of right lower extremity with fat layer exposed (H)           CONTINUE these medications which have CHANGED    Details   !! methadone (DOLOPHINE) 5 MG tablet Take 1 tablet (5 mg) by mouth 3 times daily as needed for severe pain  Qty: 30 tablet, Refills: 0    Associated Diagnoses: Ulcer of right lower extremity with fat layer exposed (H)      !! methadone  (DOLOPHINE) 5 MG tablet Take 1 tablet (5 mg) by mouth 4 times daily  Qty: 120 tablet, Refills: 0    Associated Diagnoses: Opioid dependence, uncomplicated (H)       !! - Potential duplicate medications found. Please discuss with provider.        CONTINUE these medications which have NOT CHANGED    Details   acetaminophen (TYLENOL) 500 MG tablet Take 2 tablets (1,000 mg) by mouth every 8 hours as needed for mild pain Pain 1-5/10    Associated Diagnoses: Ulcer of right lower extremity, limited to breakdown of skin (H)      acetic acid 1 % SOLN Apply topically daily With RLE wound cares      arginine (ARGINAID) PACK Take 1 packet by mouth daily In OJ      atorvastatin (LIPITOR) 40 MG tablet Take 1 tablet (40 mg) by mouth every evening    Associated Diagnoses: Hx of BKA, left (H)      Benzocaine (HURRICAINE/TOPEX) 20 % AERO spray daily TOPICAL spray 20% daily with wound care      insulin degludec (TRESIBA) 200 UNIT/ML pen Inject 34 Units subcutaneously every morning Hold for BG < 100      melatonin 1 MG TABS tablet Take 1 tablet (1 mg) by mouth nightly as needed for sleep    Associated Diagnoses: Insomnia, unspecified type      Menthol, Topical Analgesic, (BIOFREEZE EX) Apply 1 Application topically 3 times daily as needed Apply to neck and shoulders      miconazole (MICATIN) 2 % external powder Apply topically 2 times daily    Associated Diagnoses: Tinea corporis      mineral oil-hydrophilic petrolatum (AQUAPHOR) external ointment Apply to left thigh wound BID    Associated Diagnoses: Skin lesion      Multiple Vitamin (TAB-A-YUN) TABS Take 1 tablet by mouth daily (with lunch)      ondansetron (ZOFRAN ODT) 4 MG ODT tab Take 1 tablet (4 mg) by mouth every 6 hours as needed for nausea or vomiting    Associated Diagnoses: Nausea      polyethylene glycol (MIRALAX) 17 GM/Dose powder Take 17 g by mouth 2 times daily as needed for constipation    Associated Diagnoses: Drug-induced constipation; Slow transit constipation       potassium chloride ER (K-TAB) 20 MEQ CR tablet Take 20 mEq by mouth 2 times daily      senna-docusate (SENOKOT-S/PERICOLACE) 8.6-50 MG tablet Take 1 tablet by mouth 2 times daily as needed for constipation    Associated Diagnoses: Slow transit constipation; Drug-induced constipation      torsemide (DEMADEX) 20 MG tablet Take 60 mg by mouth daily      WARFARIN SODIUM PO Take by mouth daily Dosing in coordination with INR results  Full warfarin instructions: 7/26/24: Hold; Otherwise 1.5 mg every Mon, Wed, Fri; 3 mg all other days  Next INR check: 7/30/2024      Zinc oxide 10 % CREA Externally apply topically 3 times daily Apply to coccyx area once per shift after brief changes           STOP taking these medications       ceFEPIme (MAXIPIME) 2-5 GM-%(50ML) infusion Comments:   Reason for Stopping:             Allergies   Allergies   Allergen Reactions    Prednisone Nausea and Vomiting    Morphine Nausea and Vomiting    Morphine [Fumaric Acid] Nausea and Vomiting    Nitrofurantoin Unknown     Per nursing home

## 2024-08-07 NOTE — PLAN OF CARE
Physical Therapy Discharge Summary    Reason for therapy discharge:    Discharged to transitional care facility.    Progress towards therapy goal(s). See goals on Care Plan in [facility] electronic health record for goal details.  Goals not met.  Barriers to achieving goals:   discharge from facility.    Therapy recommendation(s):    Continued therapy is recommended.  Rationale/Recommendations:  PT goals not met .

## 2024-08-07 NOTE — PROGRESS NOTES
ANTICOAGULATION      Linda Loredo is overdue for an INR check.      Patient is currently inpatient at [facility] as of 7/28/24. Patient reminder will be postponed one week and will continue to assess for discharge plan.     Lisa Baez RN

## 2024-08-07 NOTE — PLAN OF CARE
Problem: Mobility Impairment  Goal: Optimal Mobility  Outcome: Not Progressing  Intervention: Optimize Mobility  Recent Flowsheet Documentation  Taken 8/6/2024 1654 by Katey Coronado RN  Activity Management: activity adjusted per tolerance  Positioning/Transfer Devices:   pillows   in use     Problem: Infection  Goal: Absence of Infection Signs and Symptoms  Outcome: Progressing     Problem: Chronic Kidney Disease  Goal: Acceptable Pain Control  Outcome: Progressing  Intervention: Prevent or Manage Pain  Recent Flowsheet Documentation  Taken 8/6/2024 1654 by Katey Coronado RN  Pain Management Interventions: medication (see MAR)     Goal Outcome Evaluation:       Pt presented to the hospital on 7/28 from a TCU with RLE wounds and cellulitis. Hx of noncompliance with cares. MRI negative for osteomyelitis. WOC consulting. Dressings are clean, dry and intact. Pain has been managed with scheduled Methadone. Hx of chronic pain. Receiving oral abx in the form of Doxycycline and Levaquin. Pressure ulcer on coccyx is covered with sacral Mepilex. Remains on bedrest. Independent with bed mobility. Hx of left BKA. A&O x4. Mood is pleasant. Tolerating a diabetic diet. Appetite is adequate. Blood sugar was 163 before dinner. Receiving s/s and carb coverage insulin for coverage. Using purewick device for urinary incontinence. Discharge pending TCU placement.

## 2024-08-07 NOTE — PROGRESS NOTES
ANTICOAGULATION  MANAGEMENT: Discharge Review    Linda Loredo chart reviewed for anticoagulation continuity of care    Hospital Admission on 7/28-8/7/24 for Ulcer of right lower extremity.    Discharge disposition: TCU [facility]    Results:    Recent labs: (last 7 days)     08/01/24  0649 08/02/24  0539 08/03/24  0938 08/04/24  0643 08/05/24  0552 08/06/24  1018 08/07/24  0602   INR 2.23* 2.78* 2.89* 2.67* 3.18* 2.52* 2.28*     Anticoagulation inpatient management:     anticoagulation calendar updated with inpatient dosing    Anticoagulation discharge instructions:     Warfarin dosing:  TCU will manage anticoagulation   Bridging: No   INR goal change: No      Medication changes affecting anticoagulation: Yes: Doxycyline for 5 days  Was on antibiotic while inpatient    Additional factors affecting anticoagulation: No     PLAN       ACC will resume monitoring upon discharge from TCU    Anticoagulation Calendar updated    Lyndsey Landis RN

## 2024-08-08 ENCOUNTER — PATIENT OUTREACH (OUTPATIENT)
Dept: CARE COORDINATION | Facility: CLINIC | Age: 79
End: 2024-08-08
Payer: COMMERCIAL

## 2024-08-08 NOTE — PROGRESS NOTES
Connected Care Resource Center: Connected [facility]    Background: Transitional Care Management program identified per system criteria and reviewed by Connected Care Resource Center team for possible outreach.    Assessment: Upon chart review, CCRC Team member will not proceed with patient outreach related to this episode of Transitional Care Management program due to reason below:    Non-[facility] TCU: CCRC team member noted patient discharged to TCU/ARU/LTACH. Patient is not established with a [facility] currently supported by Primary Care-Care Coordination therefore handoff to Primary Care-Care Coordination is not appropriate at this time.    Plan: Transitional Care Management episode addressed appropriately per reason noted above.      PAMELA Freeman  [facility], [facility]    *Connected Care Resource Team does NOT follow patient ongoing. Referrals are identified based on internal discharge reports and the outreach is to ensure patient has an understanding of their discharge instructions.

## 2024-08-09 ENCOUNTER — LAB REQUISITION (OUTPATIENT)
Dept: LAB | Facility: CLINIC | Age: 79
End: 2024-08-09
Payer: COMMERCIAL

## 2024-08-09 DIAGNOSIS — I50.9 HEART FAILURE, UNSPECIFIED (H): ICD-10-CM

## 2024-08-09 DIAGNOSIS — R42 DIZZINESS AND GIDDINESS: ICD-10-CM

## 2024-08-12 LAB
ANION GAP SERPL CALCULATED.3IONS-SCNC: 7 MMOL/L (ref 7–15)
BASOPHILS # BLD AUTO: 0 10E3/UL (ref 0–0.2)
BASOPHILS NFR BLD AUTO: 1 %
BUN SERPL-MCNC: 20.4 MG/DL (ref 8–23)
CALCIUM SERPL-MCNC: 10 MG/DL (ref 8.8–10.4)
CHLORIDE SERPL-SCNC: 99 MMOL/L (ref 98–107)
CREAT SERPL-MCNC: 0.83 MG/DL (ref 0.51–0.95)
EGFRCR SERPLBLD CKD-EPI 2021: 71 ML/MIN/1.73M2
EOSINOPHIL # BLD AUTO: 0.3 10E3/UL (ref 0–0.7)
EOSINOPHIL NFR BLD AUTO: 4 %
ERYTHROCYTE [DISTWIDTH] IN BLOOD BY AUTOMATED COUNT: 15.9 % (ref 10–15)
GLUCOSE SERPL-MCNC: 59 MG/DL (ref 70–99)
HCO3 SERPL-SCNC: 36 MMOL/L (ref 22–29)
HCT VFR BLD AUTO: 34.4 % (ref 35–47)
HGB BLD-MCNC: 10.3 G/DL (ref 11.7–15.7)
IMM GRANULOCYTES # BLD: 0 10E3/UL
IMM GRANULOCYTES NFR BLD: 0 %
LYMPHOCYTES # BLD AUTO: 1.8 10E3/UL (ref 0.8–5.3)
LYMPHOCYTES NFR BLD AUTO: 24 %
MCH RBC QN AUTO: 29.5 PG (ref 26.5–33)
MCHC RBC AUTO-ENTMCNC: 29.9 G/DL (ref 31.5–36.5)
MCV RBC AUTO: 99 FL (ref 78–100)
MONOCYTES # BLD AUTO: 0.6 10E3/UL (ref 0–1.3)
MONOCYTES NFR BLD AUTO: 8 %
NEUTROPHILS # BLD AUTO: 4.6 10E3/UL (ref 1.6–8.3)
NEUTROPHILS NFR BLD AUTO: 63 %
NRBC # BLD AUTO: 0 10E3/UL
NRBC BLD AUTO-RTO: 0 /100
PLATELET # BLD AUTO: 302 10E3/UL (ref 150–450)
POTASSIUM SERPL-SCNC: 4.1 MMOL/L (ref 3.4–5.3)
RBC # BLD AUTO: 3.49 10E6/UL (ref 3.8–5.2)
SODIUM SERPL-SCNC: 142 MMOL/L (ref 135–145)
WBC # BLD AUTO: 7.3 10E3/UL (ref 4–11)

## 2024-08-12 PROCEDURE — P9604 ONE-WAY ALLOW PRORATED TRIP: HCPCS | Mod: ORL | Performed by: FAMILY MEDICINE

## 2024-08-12 PROCEDURE — 80048 BASIC METABOLIC PNL TOTAL CA: CPT | Mod: ORL | Performed by: FAMILY MEDICINE

## 2024-08-12 PROCEDURE — 85025 COMPLETE CBC W/AUTO DIFF WBC: CPT | Mod: ORL | Performed by: FAMILY MEDICINE

## 2024-08-12 PROCEDURE — 36415 COLL VENOUS BLD VENIPUNCTURE: CPT | Mod: ORL | Performed by: FAMILY MEDICINE

## 2024-08-16 ENCOUNTER — TELEPHONE (OUTPATIENT)
Dept: ANTICOAGULATION | Facility: CLINIC | Age: 79
End: 2024-08-16
Payer: COMMERCIAL

## 2024-08-16 NOTE — TELEPHONE ENCOUNTER
ANTICOAGULATION     Lindapancho Loredo is overdue for an INR check.     Called [facility] and spoke with a  who notes that the patient is still there. Did ask if she is on the unit with the patient and she noted no but has a list of current residents.    Jessica Espinoza RN

## 2024-08-29 ENCOUNTER — TELEPHONE (OUTPATIENT)
Dept: ANTICOAGULATION | Facility: CLINIC | Age: 79
End: 2024-08-29
Payer: COMMERCIAL

## 2024-08-29 NOTE — TELEPHONE ENCOUNTER
ANTICOAGULATION     Linda Loredo is overdue for an INR check.     Called [facility], spoke with Ratna  who [facility]. Will postpone reminders one week.    Lisa Baez RN

## 2024-09-04 ENCOUNTER — LAB REQUISITION (OUTPATIENT)
Dept: LAB | Facility: CLINIC | Age: 79
End: 2024-09-04
Payer: COMMERCIAL

## 2024-09-04 DIAGNOSIS — N18.9 CHRONIC KIDNEY DISEASE, UNSPECIFIED: ICD-10-CM

## 2024-09-05 ENCOUNTER — TELEPHONE (OUTPATIENT)
Dept: ANTICOAGULATION | Facility: CLINIC | Age: 79
End: 2024-09-05
Payer: COMMERCIAL

## 2024-09-05 NOTE — PATIENT INSTRUCTIONS
"Wound care supplies should be provided by your facility.    Wound Care Instructions    DAILY and as needed, Cleanse your right leg wound(s) with Vashe soak for 5-10 minutes.    Pat Dry with non-sterile gauze    Apply Lotion to the intact skin surrounding your wound and other dry skin locations. Some good lotions include: Remedy Skin Repair Cream, Sarna, Vanicream or Cetaphil    Primary Dressing: Apply Silver alginate into/onto the wounds    Secondary dressing: Cover with super absorbant pad    Secure with non-sterile roll gauze (4\" x 75\" roll) and tape (1\" roll tape) as needed; avoid adhesive directly on the skin    Compression: Tubular compression size G followed by short stretch wrap.     It is ok to get your wound wet in the bath or shower    Please wear your compression to each clinic visit.    It is recommended that you elevate your legs throughout the day: approx 2-3 times each day  Elevate them above the level of your heart for 30 min.   Ways to do this:   Lay on the couch or your bed and prop your legs up on pillows   Recline back as far as you can go in your recliner and prop your legs on pillows.     Doing these things will help reduce the edema in your legs.    High Protein Foods  When you have an open ulcer, your bodies protein needs are much higher, so it is recommended eat good sources of protein or take a protein supplement!    Protein Supplements  -Premier Protein  -Ensure  -Boost  -Glucerna, if diabetic    Chicken  -Chicken breast, 3.5oz.-30 grams protein  -Chicken meat, cooked, 4 oz.    Beef  -Hamburger jonathan, 4 oz-28 grams protein  -Steak, 6 oz-42 grams  -Most cuts of beef- 7 grams of protein per ounce    Fish  -Most fish fillets or steaks are about 22 grams of protein for 3 1/2 oz(100 grams) of cooked fish, or 6 grams per ounce  -Tuna, 6 oz can-40 grams of protein    Pork  -Pork chop, average-22 grams protein  -Pork loin or tenderloin, 4 oz.-29 grams  -Ham, 3oz serving- 19 grams  -Wilkes, 1 " slice-3 grams    Eggs and Dairy  -Egg, large-7 grams  -Milk, 1 cup-8 grams  -Cottage cheese, 1/2 cup-15 grams  -Greek yogurt, 1 cup-usually 8-12 grams, check label    Beans  -Soy milk, 1 cup-6-10 grams  -Most beans(black, harmon, lentils, etc.) about 7-10 grams protein per half cup of cooked beans    Nuts and Seeds  -Peanut butter, 2 Tablespoons- 8 grams protein  -Almonds, 1/4 cup- 8 grams  -Peanuts, 1/4 cup-9 grams  -Sunflower seeds, 1/4 cup- 6 grams        If for some reason you are not able to get your dressing(s) changed as outlined above (due to illness, lack of supplies, lack of help) please do the following: remove old, soiled dressings; wash the wounds with saline; pat dry; apply ABD pad or other absorbant pad and secure with rolled gauze; avoid tape directly on your skin; Call the clinic as soon as possible to let us know what the current issues are in receiving wound care [phone].      SEEK MEDICAL CARE IF:  You have an increase in swelling, pain, or redness around the wound.  You have an increase in the amount of pus coming from the wound.  There is a bad smell coming from the wound.  The wound appears to be worsening/enlarging  You have a fever greater than 101.5 F      It is ok to continue current wound care treatment/products for the next 2-3 days until new wound care supplies are ordered and arrive. If longer than this please contact our office at [phone].    If you have a 2 layer or 4 layer compression wrap on these are safe to have on for ONLY 7 days. If for some reason you are not able to get the wrap(s) changed (due to illness; lack of supplies, lack of help, lack of transportation) please do the following: unwrap the old 2 or 4 layer compression wrap; avoid using scissors as you could cut your skin and cause wounds; use tubular compression when available. Call to reschedule your home care or clinic visit appointment as soon as possible.    Please NOTE: if you are 15 minutes late to your  clinic appointment you will have to be rescheduled. Please call our clinic as soon as possible if you know you will not be able to get to your appointment at [phone].    If you fail to show up to 3 scheduled clinic appointments you will be dismissed from our clinic.              We want to hear from you!  In the next few weeks, you should receive a call or email to complete a survey about your visit at [facility]. Please help us improve your appointment experience by letting us know how we did today. We strive to make your experience good and value any ways in which we could do better.      We value your input and suggestions.    Thank you for choosing the [facility]!

## 2024-09-05 NOTE — TELEPHONE ENCOUNTER
ANTICOAGULATION     Linda Loredo is overdue for an INR check.     Spoke with Ratna, [phone] , patient remains inpatient at their facility. Will postpone reminder one week.    Lisa Baez RN

## 2024-09-09 ENCOUNTER — OFFICE VISIT (OUTPATIENT)
Dept: VASCULAR SURGERY | Facility: CLINIC | Age: 79
End: 2024-09-09
Attending: FAMILY MEDICINE
Payer: COMMERCIAL

## 2024-09-09 VITALS
DIASTOLIC BLOOD PRESSURE: 83 MMHG | HEART RATE: 99 BPM | TEMPERATURE: 98.1 F | SYSTOLIC BLOOD PRESSURE: 134 MMHG | OXYGEN SATURATION: 94 % | RESPIRATION RATE: 12 BRPM

## 2024-09-09 DIAGNOSIS — I89.0 LYMPHEDEMA, NOT ELSEWHERE CLASSIFIED: ICD-10-CM

## 2024-09-09 DIAGNOSIS — L97.912 SKIN ULCER OF RIGHT LOWER LEG WITH FAT LAYER EXPOSED (H): Primary | ICD-10-CM

## 2024-09-09 PROBLEM — L03.115 CELLULITIS OF RIGHT LOWER EXTREMITY: Status: RESOLVED | Noted: 2024-06-06 | Resolved: 2024-09-09

## 2024-09-09 PROBLEM — L97.919 ULCER OF RIGHT LOWER EXTREMITY (H): Status: RESOLVED | Noted: 2024-07-12 | Resolved: 2024-09-09

## 2024-09-09 PROBLEM — T14.8XXA OPEN WOUND OF SKIN: Status: RESOLVED | Noted: 2024-07-28 | Resolved: 2024-09-09

## 2024-09-09 LAB
ANION GAP SERPL CALCULATED.3IONS-SCNC: 11 MMOL/L (ref 7–15)
BUN SERPL-MCNC: 27.4 MG/DL (ref 8–23)
CALCIUM SERPL-MCNC: 8.9 MG/DL (ref 8.8–10.4)
CHLORIDE SERPL-SCNC: 87 MMOL/L (ref 98–107)
CREAT SERPL-MCNC: 0.93 MG/DL (ref 0.51–0.95)
EGFRCR SERPLBLD CKD-EPI 2021: 62 ML/MIN/1.73M2
GLUCOSE SERPL-MCNC: 232 MG/DL (ref 70–99)
HCO3 SERPL-SCNC: 34 MMOL/L (ref 22–29)
POTASSIUM SERPL-SCNC: 3.5 MMOL/L (ref 3.4–5.3)
SODIUM SERPL-SCNC: 132 MMOL/L (ref 135–145)

## 2024-09-09 PROCEDURE — P9604 ONE-WAY ALLOW PRORATED TRIP: HCPCS | Mod: ORL | Performed by: FAMILY MEDICINE

## 2024-09-09 PROCEDURE — G0463 HOSPITAL OUTPT CLINIC VISIT: HCPCS | Performed by: FAMILY MEDICINE

## 2024-09-09 PROCEDURE — 36415 COLL VENOUS BLD VENIPUNCTURE: CPT | Mod: ORL | Performed by: FAMILY MEDICINE

## 2024-09-09 PROCEDURE — 99214 OFFICE O/P EST MOD 30 MIN: CPT | Performed by: FAMILY MEDICINE

## 2024-09-09 PROCEDURE — 80048 BASIC METABOLIC PNL TOTAL CA: CPT | Mod: ORL | Performed by: FAMILY MEDICINE

## 2024-09-09 RX ORDER — LIDOCAINE 50 MG/G
OINTMENT TOPICAL CONTINUOUS PRN
Status: ACTIVE | OUTPATIENT
Start: 2024-09-09

## 2024-09-09 RX ADMIN — LIDOCAINE: 50 OINTMENT TOPICAL at 08:14

## 2024-09-09 ASSESSMENT — PAIN SCALES - GENERAL: PAINLEVEL: MODERATE PAIN (5)

## 2024-09-09 NOTE — PROGRESS NOTES
Compression Applied to the Left Leg: None      Compression Applied to the Right Leg:  tubular compression size G and short strech    Short Stretch was applied from base of toes to just below the bend of knee

## 2024-09-09 NOTE — PROGRESS NOTES
Wound Clinic Note         Visit date: 09/09/2024       Isabella Complaint:     Linda Loredo is a 79 year old  female had concerns including Wound Check (RLE and coccyx ulcer).  The patient has lower extremity edema and right leg ulcers         HISTORY OF PRESENT ILLNESS:    Linda Loredo reports the wound has been present since May 2024.  The wound began as a water blister that ruptured.   She reports she has had a number of wounds open and closed on both lower extremities.    She was accompanied to the wound clinic today by her daughter and they both provide portions of the interval history.    I last saw this patient in the wound clinic on March 18, 2024 at which time her right leg wounds were healed.  The patient reports she was wearing her Velcro compression garments since that appointment but areas of drainage opened up in May without any other clear cause, possibly from water blisters.    She required admission to the hospital on July 10, 2024 and operative debridement of the right leg wound on July 14, 2024.  Since then for the most part she has been in a transitional care unit.    She is currently at a TCU and they have been applying the bandages once a day.  Today the patient has on some type of contact layer followed by superabsorbent pads and roll gauze.  She does not have any form of compression on today.        The pateint denies fevers or chills.  They report the pain from the wound has been 5/10 and has remained about the same recently.      The patient reports they currently do not have any routine for elevating their legs.  For the most part recently she has not had any kind of routine for elevating her legs.  However just in the last 2 days she is gotten a foam wedge which she places in her bed and elevates her legs at night.    Today the patient reports maintaining a regular diet without special attention to protein.        The patient denies a history of smoking or  chronic steroid use.  The patient confirms having diabetes and reports the blood sugars are greater than 200 once every few days.         The patient has not had any symptoms of infection relating to the wound recently and is not currently on antibiotics.       Problem List:   Past Medical History:   Diagnosis Date    Abscess of left foot 10/31/2022    Adverse effect of anticoagulants, initial encounter 2024    GUSTAVO (acute kidney injury) (H24) 2023    Anxiety 10/20/2011    Cellulitis - bilateral lower extremities 2023    Cellulitis of buttock 2023    Decubitus ulcers - 5 present on buttocks/perineal region, numerous scabbed over and unstagable on shin and bilateral feet with some bloody blisters noted on feet 2023    Depressive disorder, not elsewhere classified     Diabetic foot ulcer (H) 2023    Elevated liver enzymes 10/20/2011    Fracture of fibula, distal, left, closed 10/20/2011    ORIF 10/13/11      Herpes zoster 2005: On gabapentin and lidoderm. She has been switched from gabapentin to lyrica.       Herpes zoster without mention of complication     Hypercalcemia 2007    Hypoglycemia 2023    Hypotension 10/27/2011    Major depressive disorder, recurrent episode, moderate (H) 2008    Mild intermittent asthma     Mixed hyperlipidemia due to type 2 diabetes mellitus (H) 2008    Formatting of this note is different from the original.     22 ASCVD 10 year risk: 37.9%      Last Lipids:  Last Lipids:  Chol: 2021 130   63  HDL: 2021 46  Non-HDL: 2021 84  Chol/HDL Ratio: 2021 2.83  LDL DIRECT: . No results found in past 5 years   LDL CHOLESTEROL (mg/dL)   Date Value   2021 71      22   The 10-year ASCVD risk score (Teddy SMITH Jr.    Non-healing wound of left heel 2023    Osteomyelitis (H) 10/24/2023    Other urinary incontinence     Sepsis without acute organ dysfunction, due to  unspecified organism (H) 05/26/2023    Skin ulcer of right lower leg with fat layer exposed (H) 02/26/2024    Supratherapeutic INR 05/27/2023    Type II or unspecified type diabetes mellitus with renal manifestations, uncontrolled(250.42) (H)     Type II or unspecified type diabetes mellitus without mention of complication, not stated as uncontrolled     Unspecified essential hypertension     Unspecified open wound, left foot, subsequent encounter 09/22/2023    UTI (urinary tract infection) - possible 05/26/2023    Warfarin-induced coagulopathy (H24) 05/27/2023             Family Hx: family history includes Cancer in her maternal grandmother and paternal grandmother; Diabetes in her sister; Heart Disease in her father; Hypertension in her mother and sister; Psychotic Disorder in her sister; Respiratory in her sister.       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    Procedure: LEFT GUILLOTINE BELOW KNEE AMPUTATION.;  Surgeon: Lan Otto MD;  Location:  OR    AMPUTATE LEG BELOW KNEE Left 10/14/2023    Procedure: debridement of left below the knee amputation;  Surgeon: Lan Otto MD;  Location:  OR    APPLY WOUND VAC Left 1/2/2024    Procedure: WOUND VAC APPLICATION TO LEFT BELOW KNEE STUMP;  Surgeon: Lan Otto MD;  Location:  OR    CHOLECYSTECTOMY      GRAFT SKIN SPLIT THICKNESS FROM TRUNK TO HEAD Left 1/2/2024    Procedure: APPLICATION OF SPLIT-THICKNESS SKIN GRAFT TO LEFT BELOW KNEE STUMP, GRAFT FROM LEFT THIGH;  Surgeon: Lan Otto MD;  Location: [facility] OR    INCISION AND DRAINAGE FOOT, COMBINED Left 9/26/2023    Procedure: Surgical debridement of all nonviable skin and soft tissue and bone left foot;  Surgeon: Nolberto Thorne DPM;  Location: [facility] OR    IR LOWER EXTREMITY ANGIOGRAM LEFT  10/3/2023    IRRIGATION AND DEBRIDEMENT LOWER EXTREMITY, COMBINED Right 7/14/2024    Procedure: IRRIGATION  AND DEBRIDEMENT, LOWER EXTREMITY, RIGHT LOWER LEG;  Surgeon: Phuong Gutierrez MD;  Location: [facility] OR    ligation of fallopian tube      OPEN REDUCTION INTERNAL FIXATION ANKLE  10/13/11    left    pinning of left 2nd toe            Allergies:    Allergies   Allergen Reactions    Prednisone Nausea and Vomiting    Morphine Nausea and Vomiting    Morphine [Fumaric Acid] Nausea and Vomiting    Nitrofurantoin Unknown     Per nursing home              Medication History:    Current Outpatient Medications   Medication Sig Dispense Refill    acetaminophen (TYLENOL) 500 MG tablet Take 2 tablets (1,000 mg) by mouth every 8 hours as needed for mild pain Pain 1-5/10      acetic acid 1 % SOLN Apply topically daily With RLE wound cares      arginine (ARGINAID) PACK Take 1 packet by mouth daily In OJ      atorvastatin (LIPITOR) 40 MG tablet Take 1 tablet (40 mg) by mouth every evening      Benzocaine (HURRICAINE/TOPEX) 20 % AERO spray daily TOPICAL spray 20% daily with wound care      insulin degludec (TRESIBA) 200 UNIT/ML pen Inject 34 Units subcutaneously every morning Hold for BG < 100      melatonin 1 MG TABS tablet Take 1 tablet (1 mg) by mouth nightly as needed for sleep      Menthol, Topical Analgesic, (BIOFREEZE EX) Apply 1 Application topically 3 times daily as needed Apply to neck and shoulders      methadone (DOLOPHINE) 5 MG tablet Take 1 tablet (5 mg) by mouth 3 times daily as needed for severe pain 30 tablet 0    methadone (DOLOPHINE) 5 MG tablet Take 1 tablet (5 mg) by mouth 4 times daily 120 tablet 0    miconazole (MICATIN) 2 % external powder Apply topically 2 times daily      mineral oil-hydrophilic petrolatum (AQUAPHOR) external ointment Apply to left thigh wound BID (Patient taking differently: 2 times daily Apply to left thigh wound BID)      Multiple Vitamin (TAB-A-YUN) TABS Take 1 tablet by mouth daily (with lunch)      ondansetron (ZOFRAN ODT) 4 MG ODT tab Take 1 tablet (4 mg) by mouth every 6 hours as needed  for nausea or vomiting      polyethylene glycol (MIRALAX) 17 GM/Dose powder Take 17 g by mouth 2 times daily as needed for constipation      potassium chloride ER (K-TAB) 20 MEQ CR tablet Take 20 mEq by mouth 2 times daily      senna-docusate (SENOKOT-S/PERICOLACE) 8.6-50 MG tablet Take 1 tablet by mouth 2 times daily as needed for constipation      torsemide (DEMADEX) 20 MG tablet Take 60 mg by mouth daily      WARFARIN SODIUM PO Take by mouth daily Dosing in coordination with INR results  Full warfarin instructions: 7/26/24: Hold; Otherwise 1.5 mg every Mon, Wed, Fri; 3 mg all other days  Next INR check: 7/30/2024      wound support modular (EXPEDITE) LIQD bottle Take 60 mLs by mouth daily      Zinc oxide 10 % CREA Externally apply topically 3 times daily Apply to coccyx area once per shift after brief changes       Current Facility-Administered Medications   Medication Dose Route Frequency Provider Last Rate Last Admin    lidocaine (XYLOCAINE) 5 % ointment   Topical Continuous PRN Luis E Morley MD   $Started at 09/09/24 0814         Tobacco History:  reports that she has never smoked. She has never used smokeless tobacco.       REVIEW OF SYMPTOMS:   The review of systems was negative except as noted in the HPI.           PHYSICAL EXAMINATION:     /83   Pulse 99   Temp 98.1  F (36.7  C) (Oral)   Resp 12   SpO2 94%            GENERAL: The patient overall appears well and is no acute distress.   HEAD: normocephalic   EYES: Sclera and conjunctiva clear   NECK: no obvious masses   LUNGS: breathing is unlabored.   EXTREMITIES: No clubbing, cyanosis or edema   SKIN: No rashes or other abnormalities except as noted under the Wound section below.   NEUROLOGICAL: normal motor and sensory function   EDEMA: Sever       WOUND: She has multiple open areas on the right leg from just below the knee down to and including the foot.  There are no signs of infection today.  She does have some necrotic material in  the wound bed which I was able to clear away with some gauze, no sharp debridement was required.      Also see below for wound details:       Circumferential volume measures:            9/9/2024     8:00 AM   Circumferential Measures   Right just above MTP 27.5   Right Ankle 32.8   Right Widest Calf 57       Ulceration(s)/Wound(s):   Please see the media tab under the chart review for pictures of the wounds.  Nursing staff removed dressings and cleansed wound.    VASC Wound Right leg (Active)   Pre Size Length 23 09/09/24 0800   Pre Size Width 57 09/09/24 0800   Description scattered 09/09/24 0800       VASC Wound Right heel (Active)   Pre Size Length 2.5 09/09/24 0800   Pre Size Width 3.5 09/09/24 0800   Pre Size Depth 0.3 09/09/24 0800       VASC Wound Right lateral foot (Active)   Pre Size Length 3.5 09/09/24 0800   Pre Size Width 4 09/09/24 0800   Pre Size Depth 0.4 09/09/24 0800               Recent Labs   Lab Test 01/02/24  0804 12/08/23  1226 09/23/23  0533   A1C 8.7* 7.6* 7.0*          Recent Labs   Lab Test 11/17/23  1033 11/01/23  0613 09/22/23  1941   ALBUMIN 3.8 3.2* 3.1*         WBC   Date Value Ref Range Status   02/22/2008 9.4 4.0 - 11.0 10e9/L Final     WBC Count   Date Value Ref Range Status   08/12/2024 7.3 4.0 - 11.0 10e3/uL Final     Albumin   Date Value Ref Range Status   07/29/2024 2.5 (L) 3.5 - 5.2 g/dL Final   02/22/2008 4.1 3.2 - 4.5 g/dL Final           No sharp debridement performed today.                  ASSESSMENT:   This is a 79 year old  female with right leg ulcers, the patient also has lower extremity edema which was also managed during today's clinic visit.          PLAN:   The nurses at the [facility] nursing facility will bandage the area beginning with a Vashe soak followed by alginate, superabsorbent pads, short stretch compression wrap and a Tubigrip stocking all changed once a day.  Once her edema is under better control we can go back to using her Velcro compression  garments.    Separate from the wound care instructions we then discussed management strategies for lower extremity edema.  I explained the keys for managing lower extremity edema are compression and elevation.  I explained to the patient today that controlling the edema is probably the most important thing we can do to help heal the wound.  I have specifically recommended that they lay down with their legs above the level of the heart for 30 minutes at least twice a day.  I have also encouraged the patient to continue to sleep in a bed.     I have explained to the patient the importance of protein intake to wound healing.  I have explained that increasing protein intake will speed wound healing.  We discussed several types of food that are high in protein and the wound care nurse gave the patient a handout that summarizes this information.  In addition to further speed wound healing I have encouraged the patient to take a protein supplement.   The patient will return to the wound clinic in 3 to 4 weeks.        30 minutes spent on the date of the encounter doing chart review, history and exam, documentation and further activities per the note, this time excludes any procedure time      Luis E Morley MD  09/09/2024   8:32 AM   [facility]ound  [phone]    This note was electronically signed by Luis E Morley MD

## 2024-09-09 NOTE — PROGRESS NOTES
Clinic Administered Medication Documentation    Patient was given lidocaine 5% ointment to right leg. Prior to medication administration, verified patient's identity using patient's name and date of birth.    GUILLERMO KENNEDY RN

## 2024-09-12 ENCOUNTER — DOCUMENTATION ONLY (OUTPATIENT)
Dept: ANTICOAGULATION | Facility: CLINIC | Age: 79
End: 2024-09-12
Payer: COMMERCIAL

## 2024-09-12 NOTE — PROGRESS NOTES
ANTICOAGULATION     Linda Loredo is overdue for an INR check.     Patient remains at TCU per chart review, will postpone reminder one week.    Lisa Baez RN  9/12/2024  Anticoagulation Clinic  [facility] for routing messages: [facility] (TCU/LTC/intermediate)  [facility] patient phone line: [phone]

## 2024-09-19 ENCOUNTER — TELEPHONE (OUTPATIENT)
Dept: ANTICOAGULATION | Facility: CLINIC | Age: 79
End: 2024-09-19
Payer: COMMERCIAL

## 2024-09-19 NOTE — TELEPHONE ENCOUNTER
"ANTICOAGULATION     Linda ELIE Awa Loredo is overdue for an INR check.     Spoke with Ratna and the patient's  regarding length of stay.  Per  she will \"be here for a while\".  Advised [facility] will check back in one month    Stephanie Mathew RN  9/19/2024  Anticoagulation Clinic  [facility] for routing messages: [facility]  [facility] patient phone line: [phone]  "

## 2024-09-30 ENCOUNTER — OFFICE VISIT (OUTPATIENT)
Dept: VASCULAR SURGERY | Facility: CLINIC | Age: 79
End: 2024-09-30
Attending: FAMILY MEDICINE
Payer: COMMERCIAL

## 2024-09-30 VITALS — OXYGEN SATURATION: 94 % | SYSTOLIC BLOOD PRESSURE: 138 MMHG | HEART RATE: 94 BPM | DIASTOLIC BLOOD PRESSURE: 71 MMHG

## 2024-09-30 DIAGNOSIS — I89.0 LYMPHEDEMA, NOT ELSEWHERE CLASSIFIED: ICD-10-CM

## 2024-09-30 DIAGNOSIS — L97.912 SKIN ULCER OF RIGHT LOWER LEG WITH FAT LAYER EXPOSED (H): Primary | ICD-10-CM

## 2024-09-30 DIAGNOSIS — M79.89 OTHER SPECIFIED SOFT TISSUE DISORDERS: ICD-10-CM

## 2024-09-30 PROCEDURE — 11042 DBRDMT SUBQ TIS 1ST 20SQCM/<: CPT | Performed by: FAMILY MEDICINE

## 2024-09-30 PROCEDURE — 99214 OFFICE O/P EST MOD 30 MIN: CPT | Mod: 25 | Performed by: FAMILY MEDICINE

## 2024-09-30 PROCEDURE — G0463 HOSPITAL OUTPT CLINIC VISIT: HCPCS | Mod: 25 | Performed by: FAMILY MEDICINE

## 2024-09-30 RX ORDER — METOLAZONE 2.5 MG/1
TABLET ORAL
COMMUNITY
Start: 2024-09-03

## 2024-09-30 RX ORDER — INSULIN ASPART 100 [IU]/ML
INJECTION, SOLUTION INTRAVENOUS; SUBCUTANEOUS
COMMUNITY
Start: 2024-03-06

## 2024-09-30 RX ADMIN — LIDOCAINE: 50 OINTMENT TOPICAL at 08:02

## 2024-09-30 ASSESSMENT — PAIN SCALES - GENERAL: PAINLEVEL: SEVERE PAIN (6)

## 2024-09-30 NOTE — PROGRESS NOTES
Clinic Administered Medication Documentation    Patient was given lidocaine 5% ointment. Prior to medication administration, verified patient's identity using patient's name and date of birth.    Chapis Botello

## 2024-09-30 NOTE — PROGRESS NOTES
Wound Clinic Note         Visit date: 09/30/2024       Isabella Complaint:     Linda Loredo is a 79 year old  female had concerns including Wound Check.  The patient has lower extremity edema and right leg ulcers         HISTORY OF PRESENT ILLNESS:    Linda Loredo reports the wound has been present since May 2024.  The wound began as a water blister that ruptured.   She reports she has had a number of wounds open and closed on both lower extremities.  She required admission to the hospital on July 10, 2024 and operative debridement of the right leg wound on July 14, 2024.  Since then for the most part she has been in a transitional care unit.    She is currently at a TCU and they have been applying the bandages once a day.  Today the patient has on Xeroform followed by superabsorbent pads and roll gauze and Ace wrap.  She reports the facility that she is at were not able to get any of the bandages that we had ordered.  She reports that her daughter had to go out to various medical supply stores to try to get the bandages.        The pateint denies fevers or chills.  They report the pain from the wound has been 6/10 and has remained about the same recently.      The patient reports laying down to elevate their legs above the level of their heart at least twice a day.  The patient confirms they do sleep in a bed.      Today the patient reports maintaining a high protein diet, but has not been taking protein supplements lately.        The patient denies a history of smoking or chronic steroid use.  The patient confirms having diabetes and reports the blood sugars are greater than 200 once every few days.         The patient has not had any symptoms of infection relating to the wound recently and is not currently on antibiotics.       Problem List:   Past Medical History:   Diagnosis Date    Abscess of left foot 10/31/2022    Adverse effect of anticoagulants, initial encounter 04/16/2024     GUSTAVO (acute kidney injury) (H) 2023    Anxiety 10/20/2011    Cellulitis - bilateral lower extremities 2023    Cellulitis of buttock 2023    Decubitus ulcers - 5 present on buttocks/perineal region, numerous scabbed over and unstagable on shin and bilateral feet with some bloody blisters noted on feet 2023    Depressive disorder, not elsewhere classified     Diabetic foot ulcer (H) 2023    Elevated liver enzymes 10/20/2011    Fracture of fibula, distal, left, closed 10/20/2011    ORIF 10/13/11      Herpes zoster 2005: On gabapentin and lidoderm. She has been switched from gabapentin to lyrica.       Herpes zoster without mention of complication     Hypercalcemia 2007    Hypoglycemia 2023    Hypotension 10/27/2011    Major depressive disorder, recurrent episode, moderate (H) 2008    Mild intermittent asthma     Mixed hyperlipidemia due to type 2 diabetes mellitus (H) 2008    Formatting of this note is different from the original.     22 ASCVD 10 year risk: 37.9%      Last Lipids:  Last Lipids:  Chol: 2021 130   63  HDL: 2021 46  Non-HDL: 2021 84  Chol/HDL Ratio: 2021 2.83  LDL DIRECT: . No results found in past 5 years   LDL CHOLESTEROL (mg/dL)   Date Value   2021 71      22   The 10-year ASCVD risk score ([place] SMITH Jr[place]    Non-healing wound of left heel 2023    Osteomyelitis (H) 10/24/2023    Other urinary incontinence     Sepsis without acute organ dysfunction, due to unspecified organism (H) 2023    Skin ulcer of right lower leg with fat layer exposed (H) 2024    Supratherapeutic INR 2023    Type II or unspecified type diabetes mellitus with renal manifestations, uncontrolled(250.42) (H)     Type II or unspecified type diabetes mellitus without mention of complication, not stated as uncontrolled     Unspecified essential hypertension     Unspecified open wound, left  foot, subsequent encounter 09/22/2023    UTI (urinary tract infection) - possible 05/26/2023    Warfarin-induced coagulopathy (H) 05/27/2023             Family Hx: family history includes Cancer in her maternal grandmother and paternal grandmother; Diabetes in her sister; Heart Disease in her father; Hypertension in her mother and sister; Psychotic Disorder in her sister; Respiratory in her sister.       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    Procedure: LEFT GUILLOTINE BELOW KNEE AMPUTATION.;  Surgeon: Lan Otto MD;  Location: [facility] OR    AMPUTATE LEG BELOW KNEE Left 10/14/2023    Procedure: debridement of left below the knee amputation;  Surgeon: Lan Otto MD;  Location:  OR    APPLY WOUND VAC Left 1/2/2024    Procedure: WOUND VAC APPLICATION TO LEFT BELOW KNEE STUMP;  Surgeon: Lan Otto MD;  Location:  OR    CHOLECYSTECTOMY      GRAFT SKIN SPLIT THICKNESS FROM TRUNK TO HEAD Left 1/2/2024    Procedure: APPLICATION OF SPLIT-THICKNESS SKIN GRAFT TO LEFT BELOW KNEE STUMP, GRAFT FROM LEFT THIGH;  Surgeon: Lan Otto MD;  Location:  OR    INCISION AND DRAINAGE FOOT, COMBINED Left 9/26/2023    Procedure: Surgical debridement of all nonviable skin and soft tissue and bone left foot;  Surgeon: Nolberto Thorne DPM;  Location: [facility] OR    IR LOWER EXTREMITY ANGIOGRAM LEFT  10/3/2023    IRRIGATION AND DEBRIDEMENT LOWER EXTREMITY, COMBINED Right 7/14/2024    Procedure: IRRIGATION AND DEBRIDEMENT, LOWER EXTREMITY, RIGHT LOWER LEG;  Surgeon: Phuong Gutierrez MD;  Location: [facility] OR    ligation of fallopian tube      OPEN REDUCTION INTERNAL FIXATION ANKLE  10/13/11    left    pinning of left 2nd toe            Allergies:    Allergies   Allergen Reactions    Prednisone Nausea and Vomiting    Morphine Nausea and Vomiting    Morphine [Fumaric Acid] Nausea and Vomiting    Nitrofurantoin Unknown     Per nursing  home              Medication History:    Current Outpatient Medications   Medication Sig Dispense Refill    acetaminophen (TYLENOL) 500 MG tablet Take 2 tablets (1,000 mg) by mouth every 8 hours as needed for mild pain Pain 1-5/10      acetic acid 1 % SOLN Apply topically daily With RLE wound cares      arginine (ARGINAID) PACK Take 1 packet by mouth daily In OJ      atorvastatin (LIPITOR) 40 MG tablet Take 1 tablet (40 mg) by mouth every evening      Benzocaine (HURRICAINE/TOPEX) 20 % AERO spray daily TOPICAL spray 20% daily with wound care      Insulin Aspart FlexPen 100 UNIT/ML SOPN       insulin degludec (TRESIBA) 200 UNIT/ML pen Inject 34 Units subcutaneously every morning Hold for BG < 100      melatonin 1 MG TABS tablet Take 1 tablet (1 mg) by mouth nightly as needed for sleep      Menthol, Topical Analgesic, (BIOFREEZE EX) Apply 1 Application topically 3 times daily as needed Apply to neck and shoulders      methadone (DOLOPHINE) 5 MG tablet Take 1 tablet (5 mg) by mouth 3 times daily as needed for severe pain 30 tablet 0    methadone (DOLOPHINE) 5 MG tablet Take 1 tablet (5 mg) by mouth 4 times daily 120 tablet 0    metolazone (ZAROXOLYN) 2.5 MG tablet TAKE 1 TAB BY MOUTH ON THURSDAY FOR LYMPHEDEMA      miconazole (MICATIN) 2 % external powder Apply topically 2 times daily      mineral oil-hydrophilic petrolatum (AQUAPHOR) external ointment Apply to left thigh wound BID (Patient taking differently: 2 times daily. Apply to left thigh wound BID)      Multiple Vitamin (TAB-A-YUN) TABS Take 1 tablet by mouth daily (with lunch)      ondansetron (ZOFRAN ODT) 4 MG ODT tab Take 1 tablet (4 mg) by mouth every 6 hours as needed for nausea or vomiting      polyethylene glycol (MIRALAX) 17 GM/Dose powder Take 17 g by mouth 2 times daily as needed for constipation      potassium chloride ER (K-TAB) 20 MEQ CR tablet Take 20 mEq by mouth 2 times daily      senna-docusate (SENOKOT-S/PERICOLACE) 8.6-50 MG tablet Take 1  tablet by mouth 2 times daily as needed for constipation      torsemide (DEMADEX) 20 MG tablet Take 60 mg by mouth daily      WARFARIN SODIUM PO Take by mouth daily Dosing in coordination with INR results  Full warfarin instructions: 7/26/24: Hold; Otherwise 1.5 mg every Mon, Wed, Fri; 3 mg all other days  Next INR check: 7/30/2024      wound support modular (EXPEDITE) LIQD bottle Take 60 mLs by mouth daily      Zinc oxide 10 % CREA Externally apply topically 3 times daily Apply to coccyx area once per shift after brief changes       Current Facility-Administered Medications   Medication Dose Route Frequency Provider Last Rate Last Admin    lidocaine (XYLOCAINE) 5 % ointment   Topical Continuous PRN Luis E Morley MD   New Bag at 09/30/24 0802         Tobacco History:  reports that she has never smoked. She has never used smokeless tobacco.       REVIEW OF SYMPTOMS:   The review of systems was negative except as noted in the HPI.           PHYSICAL EXAMINATION:     /71   Pulse 94   SpO2 94%            GENERAL: The patient overall appears well and is no acute distress.   HEAD: normocephalic   EYES: Sclera and conjunctiva clear   NECK: no obvious masses   LUNGS: breathing is unlabored.   EXTREMITIES: No clubbing, cyanosis or edema   SKIN: No rashes or other abnormalities except as noted under the Wound section below.   NEUROLOGICAL: normal motor and sensory function   EDEMA: Sever       WOUND: She has multiple open areas on the right leg from just below the knee down to and including the foot.  There are no signs of infection today.  She does have some necrotic material in the right foot wounds.  Some of the wound areas are slightly smaller compared with her last clinic visit.  The bandages are completely saturated today.      Also see below for wound details:       Circumferential volume measures:            9/9/2024     8:00 AM 9/30/2024     7:00 AM   Circumferential Measures   Right just above MTP  27.5 28.4   Right Ankle 32.8 33.5   Right Widest Calf 57 60       Ulceration(s)/Wound(s):   Please see the media tab under the chart review for pictures of the wounds.  Nursing staff removed dressings and cleansed wound.    VASC Wound Right leg (Active)   Pre Size Length 23 09/30/24 0700   Pre Size Width 57 09/30/24 0700   Pre Size Depth 0.1 09/30/24 0700   Description scattered 09/30/24 0700       VASC Wound Right heel (Active)   Pre Size Length 2.5 09/30/24 0700   Pre Size Width 3 09/30/24 0700   Pre Size Depth 0.5 09/30/24 0700   Pre Total Sq cm 7.5 09/30/24 0700       VASC Wound Right lateral foot (Active)   Pre Size Length 3 09/30/24 0700   Pre Size Width 3 09/30/24 0700   Pre Size Depth 0.5 09/30/24 0700   Pre Total Sq cm 9 09/30/24 0700                 Recent Labs   Lab Test 01/02/24  0804 12/08/23  1226 09/23/23  0533   A1C 8.7* 7.6* 7.0*          Recent Labs   Lab Test 11/17/23  1033 11/01/23  0613 09/22/23  1941   ALBUMIN 3.8 3.2* 3.1*         WBC   Date Value Ref Range Status   02/22/2008 9.4 4.0 - 11.0 10e9/L Final     WBC Count   Date Value Ref Range Status   08/12/2024 7.3 4.0 - 11.0 10e3/uL Final     Albumin   Date Value Ref Range Status   07/29/2024 2.5 (L) 3.5 - 5.2 g/dL Final   02/22/2008 4.1 3.2 - 4.5 g/dL Final           Procedure note:  Informed Consent:  Patient acknowledges that I have explained the patient's general medical condition to him/her.  Patient has been informed and acknowledges that I have explained the risks or complications of wound debridement including, but not limited to, scarring, damage to blood vessels or surrounding areas such as nerves and organs, allergic reactions to topical and injected anaesthetic and/or skin prep solutions.  Other risks include excessive bleeding, removal of healthy tissue, infection, pain and inflammation, and prolonged or failure to heal.  Patient acknowledges that bleeding after debridement and pain may worsen after debridement and that  dead/necrotic tissue may cause bacteria and toxins to be released into the bloodstream and cause sepsis or shock.  Patient acknowledges that I have explained that the wound may be larger after debridement.  Patient acknowledges that they may need serial debridements while under care in the wound department.  Patient acknowledges that they were given an opportunity to ask questions about treatment and I have answered patient's questions.    Anesthetized as needed with lidocaine.  Using a curette and/or a scalpel I performed an excisional debridement removing all necrotic material at the right foot wound in to the level of viable subcutaneous tissue.  I obtained hemostasis with direct pressure.  The patient tolerated the procedure well.  EBL: <5 ml  Total debridement surface area: Less than 20 cm      The right leg wounds did not require sharp debridement.          ASSESSMENT:   This is a 79 year old  female with right leg ulcers, the patient also has lower extremity edema which was also managed during today's clinic visit.          PLAN:   The nurses at the skilled nursing facility will bandage the area beginning with a Vashe soak followed by alginate, superabsorbent pads, short stretch compression wrap and a Tubigrip stocking all changed once or twice a day depending on the drainage.  Will send new orders back to the skilled nursing facility outlining the bandages that we are recommending.  These are very standard bandages which any skilled nursing facility should be able to obtain.  We will get a right leg venous insufficiency ultrasound to evaluate for areas of superficial venous insufficiency.    Separate from the wound care instructions we then discussed management strategies for lower extremity edema.  I explained the keys for managing lower extremity edema are compression and elevation.  I have encouraged the patient to continue to elevate the legs as they have been doing, including laying down with their legs  above the level of the heart for 30 minutes at least twice a day.  I have also encouraged the patient to continue to sleep in a bed.     I have encouraged the patient to continue on their high protein diet to aid in wound healing.   The patient will return to the wound clinic in 3 to 4 weeks.        30 minutes spent on the date of the encounter doing chart review, history and exam, documentation and further activities per the note, this time excludes any procedure time      Luis E Morley MD  09/30/2024   8:25 AM   [facility] Vascular/Wound  [phone]    This note was electronically signed by Luis E Morley MD

## 2024-09-30 NOTE — PROGRESS NOTES
Compression Applied to the Left Leg: None    Compression Applied to the Right Leg: Short Stretch and size G Dermafit    Short Stretch was applied from base of toes to just below the bend of knee  with Dermafit beneath

## 2024-09-30 NOTE — PATIENT INSTRUCTIONS
"Wound care supplies should be provided by your facility.    Wound Care Instructions    DAILY and as needed (IF DRAINAGE IS LEAKING THROUGH PLEASE CHANGE MORE THAN ONCE DAILY), Cleanse your right leg wound(s) with Vashe soak for 5-10 minutes.    Pat Dry with non-sterile gauze    Apply Lotion to the intact skin surrounding your wound and other dry skin locations. Some good lotions include: Remedy Skin Repair Cream, Sarna, Vanicream or Cetaphil    Primary Dressing: Apply Silver alginate into/onto the wounds    Secondary dressing: Cover with super absorbant pad    Secure with non-sterile roll gauze (4\" x 75\" roll) and tape (1\" roll tape) as needed; avoid adhesive directly on the skin    Compression: Tubular compression size G followed by short stretch wrap.    It is ok to get your wound wet in the bath or shower    Please wear your compression to each clinic visit.      SEEK MEDICAL CARE IF:  You have an increase in swelling, pain, or redness around the wound.  You have an increase in the amount of pus coming from the wound.  There is a bad smell coming from the wound.  The wound appears to be worsening/enlarging  You have a fever greater than 101.5 F    It is recommended that you elevate your legs throughout the day: approx 2-3 times each day  Elevate them above the level of your heart for 30 min.  Ways to do this:  Lay on the couch or your bed and prop your legs up on pillows  Recline back as far as you can go in your recliner and prop your legs on pillows.    Doing these things will help reduce the edema in your legs.    High Protein Foods  When you have an open ulcer, your bodies protein needs are much higher, so it is recommended eat good sources of protein or take a protein supplement!    Protein Supplements  -Premier Protein  -Ensure  -Boost  -Glucerna, if diabetic    Chicken  -Chicken breast, 3.5oz.-30 grams protein  -Chicken meat, cooked, 4 oz.    Beef  -Hamburger jonathan, 4 oz-28 grams protein  -Steak, 6 oz-42 " grams  -Most cuts of beef- 7 grams of protein per ounce    Fish  -Most fish fillets or steaks are about 22 grams of protein for 3 1/2 oz(100 grams) of cooked fish, or 6 grams per ounce  -Tuna, 6 oz can-40 grams of protein    Pork  -Pork chop, average-22 grams protein  -Pork loin or tenderloin, 4 oz.-29 grams  -Ham, 3oz serving- 19 grams  -Wilkes, 1 slice-3 grams    Eggs and Dairy  -Egg, large-7 grams  -Milk, 1 cup-8 grams  -Cottage cheese, 1/2 cup-15 grams  -Greek yogurt, 1 cup-usually 8-12 grams, check label    Beans  -Soy milk, 1 cup-6-10 grams  -Most beans(black, harmon, lentils, etc.) about 7-10 grams protein per half cup of cooked beans    Nuts and Seeds  -Peanut butter, 2 Tablespoons- 8 grams protein  -Almonds, 1/4 cup- 8 grams  -Peanuts, 1/4 cup-9 grams  -Sunflower seeds, 1/4 cup- 6 grams        If for some reason you are not able to get your dressing(s) changed as outlined above (due to illness, lack of supplies, lack of help) please do the following: remove old, soiled dressings; wash the wounds with saline; pat dry; apply ABD pad or other absorbant pad and secure with rolled gauze; avoid tape directly on your skin; Call the clinic as soon as possible to let us know what the current issues are in receiving wound care [phone].

## 2024-10-09 ENCOUNTER — TELEPHONE (OUTPATIENT)
Dept: VASCULAR SURGERY | Facility: CLINIC | Age: 79
End: 2024-10-09
Payer: COMMERCIAL

## 2024-10-09 NOTE — TELEPHONE ENCOUNTER
Spoke with Eleuterio and reports pt was seen by SHANEL Chavez at [facility] and who also works with Dr. Morley at [facility] wound clinic. It was mentioned she should have been healed by now so she ordered an ALMA to be done yesterday (10/8) but they were unable to do due to patient's pain. Pt did have ALMA and arterial duplex RLE 7/2024. Eleuterio explained that Pat refuses to elevate her legs and loves to sit in her chair. Reports she eats like crap and anything with sugar she is on board and BS fasting 218. Pt has been tolerating tubi and comprilan on right leg. He does report that she is now on doxycyline for cellulitis infection until 10/14/24. Venous comp test moved sooner to 10/11/24 and will premedicate prior to appointment. Awaiting note and Eleuterio will resend to vascular email.

## 2024-10-09 NOTE — TELEPHONE ENCOUNTER
Caller: nurse Eleuterio at [facility]    Provider: MD Luis E Morley    Detailed reason for call: anusha Mcclellan, asking for a call back to discuss this patient.  He states that Dr. Cordova saw this patient recently at [facility] and is recommending additional arterial ultrasounds to the venous competency ones that Dr. Morley ordered.  However, he states that this patient will not tolerate the ultrasounds due to her pain levels.  He asked if we can skip to more invasive treatments, maybe an IR order to get working on increasing the blood flow for this patient?  He is going to fax over the notes from the visit with Dr. Cordova and is asking that we review and give him a call back ASAP.      Best phone number to contact: [phone]    Best time to contact: any    Ok to leave a detailed message: Yes    Ok to speak to authorized person if needed: No

## 2024-10-10 NOTE — TELEPHONE ENCOUNTER
Eleuterio called back. Eleuterio will have patient come to US tomorrow, and [facility] he will contact Dr. Otto's office to schedule an appt. At that appt Dr. Otto can decide if they want to order any arterial studies at that time.

## 2024-10-10 NOTE — TELEPHONE ENCOUNTER
Per Dr. Sanches, patient should contact Dr. Otto first about concerns for arterial issues.  If patient cannot see Dr. Otto then Dr. Sesay would be happy to see patient with TCPO2/ALMA first, he did state he thought the imaging from July looked ok.  Left message for Eleuterio nurse manager with update, asked him to call back with any questions.

## 2024-10-11 ENCOUNTER — ANCILLARY PROCEDURE (OUTPATIENT)
Dept: VASCULAR ULTRASOUND | Facility: CLINIC | Age: 79
End: 2024-10-11
Attending: FAMILY MEDICINE
Payer: COMMERCIAL

## 2024-10-11 DIAGNOSIS — L97.912 SKIN ULCER OF RIGHT LOWER LEG WITH FAT LAYER EXPOSED (H): ICD-10-CM

## 2024-10-11 DIAGNOSIS — M79.89 OTHER SPECIFIED SOFT TISSUE DISORDERS: ICD-10-CM

## 2024-10-11 DIAGNOSIS — I89.0 LYMPHEDEMA, NOT ELSEWHERE CLASSIFIED: ICD-10-CM

## 2024-10-11 PROCEDURE — 93971 EXTREMITY STUDY: CPT | Mod: RT

## 2024-10-11 PROCEDURE — 93971 EXTREMITY STUDY: CPT | Mod: 26 | Performed by: STUDENT IN AN ORGANIZED HEALTH CARE EDUCATION/TRAINING PROGRAM

## 2024-10-14 ENCOUNTER — TELEPHONE (OUTPATIENT)
Dept: OTHER | Facility: CLINIC | Age: 79
End: 2024-10-14
Payer: COMMERCIAL

## 2024-10-14 NOTE — TELEPHONE ENCOUNTER
Hx L BKA with 10/07/2023, application of split-thickness skin graft to left below the knee stump - graft from left thigh 01/02/24 - with Dr. Otto.    Writer spoke to Eleuterio, patient's nurse.  Eleuterio reports patient has nonhealing wounds on her RLE.  Eleuterio reports speaking with Dr. Farmer's care team and was told to contact Dr. Otto as he has previously did patient's vascular surgery.    Writer consulted with Dr. Otto.  Dr. Otto reviewed patient's CTA on 07/14/24 and states the patient does not have arterial disease.  Per Dr. Otto, vascular surgery would not have anything to offer her and she should follow up with a wound provider.  This was communicated to Eleuterio.

## 2024-10-14 NOTE — TELEPHONE ENCOUNTER
[facility]    Who is the name of the provider?:  NONE   What is the location you see this provider at/preferred location?: Mell Mcclellan from [facility]   Phone number:  [phone]  Nurse call back needed:  Yes     Reason for call:  Eleuterio from [facility] care called and requested an appointment with Dr. Otto for patient due to existing non healing wounds and formation of new wounds.         Can we leave a detailed message on this number?  YES     10/14/2024, 2:21 PM

## 2024-10-24 NOTE — PLAN OF CARE
DISPLAY PLAN FREE TEXT Goal Outcome Evaluation:      Plan of Care Reviewed With: patient    Overall Patient Progress: improvingOverall Patient Progress: improving    Outcome Evaluation: Pt is A&Ox4. VSS. Pt reports improvement in leg redness. Wound care nurse came in and did wound care, wound assessment and dressing change-writer was in room. Pt was switched to oral bactrim for antibioitc managment. Blood glucose this shift have been 170-202.  Pt is on carb control diet. Pt is on scheduled methadone for pain managment and took one dose of 4mg po Dilaudid previous to wound care/dressing change as this is very painful for patient. Pt has been shifting self and turning self in bed, writer has offered to assist but pt stated she did not want assistance as she had been able to turn self (pt refused help with T/R). Braga in place urine is ramona to tea color. Pt is on Mag protocol and was replaced with oral mag-will be rechecked tomorrow AM.       DISPLAY PLAN FREE TEXT DISPLAY PLAN FREE TEXT DISPLAY PLAN FREE TEXT DISPLAY PLAN FREE TEXT DISPLAY PLAN FREE TEXT DISPLAY PLAN FREE TEXT DISPLAY PLAN FREE TEXT DISPLAY PLAN FREE TEXT DISPLAY PLAN FREE TEXT DISPLAY PLAN FREE TEXT DISPLAY PLAN FREE TEXT DISPLAY PLAN FREE TEXT DISPLAY PLAN FREE TEXT DISPLAY PLAN FREE TEXT DISPLAY PLAN FREE TEXT DISPLAY PLAN FREE TEXT DISPLAY PLAN FREE TEXT DISPLAY PLAN FREE TEXT DISPLAY PLAN FREE TEXT DISPLAY PLAN FREE TEXT

## 2024-10-31 ENCOUNTER — OFFICE VISIT (OUTPATIENT)
Dept: VASCULAR SURGERY | Facility: CLINIC | Age: 79
End: 2024-10-31
Attending: FAMILY MEDICINE
Payer: COMMERCIAL

## 2024-10-31 ENCOUNTER — TELEPHONE (OUTPATIENT)
Dept: VASCULAR SURGERY | Facility: CLINIC | Age: 79
End: 2024-10-31
Payer: COMMERCIAL

## 2024-10-31 VITALS
DIASTOLIC BLOOD PRESSURE: 77 MMHG | TEMPERATURE: 98.1 F | HEART RATE: 94 BPM | OXYGEN SATURATION: 98 % | SYSTOLIC BLOOD PRESSURE: 136 MMHG | RESPIRATION RATE: 16 BRPM

## 2024-10-31 DIAGNOSIS — I89.0 LYMPHEDEMA, NOT ELSEWHERE CLASSIFIED: ICD-10-CM

## 2024-10-31 DIAGNOSIS — L97.912 SKIN ULCER OF RIGHT LOWER LEG WITH FAT LAYER EXPOSED (H): Primary | ICD-10-CM

## 2024-10-31 PROCEDURE — 99214 OFFICE O/P EST MOD 30 MIN: CPT | Performed by: FAMILY MEDICINE

## 2024-10-31 PROCEDURE — G0463 HOSPITAL OUTPT CLINIC VISIT: HCPCS | Performed by: FAMILY MEDICINE

## 2024-10-31 ASSESSMENT — PAIN SCALES - GENERAL: PAINLEVEL_OUTOF10: SEVERE PAIN (6)

## 2024-10-31 NOTE — PROGRESS NOTES
Spoke with Eleuterio from patient's care facility.  Patient notified him that Dr. Morley was asking why the foot and heel wounds are worsening. Eleuterio states that patient is refusing to wear prevalon boot.  She often refuses to let staff push her wheelchair, she then drags her foot on the floor doing it herself.  Dr. Morley updated.

## 2024-10-31 NOTE — TELEPHONE ENCOUNTER
"Patient's daughter Lu called to touch base with staff prior to her mother's appointment with Dr. Morley this morning. She is frustrated because patient has been having \"tremendous pain\" for months with the dressing changes and they feel there has been no improvement in the wound. Patient's legs have also been elevated 22/24 hours a day.  Daughter also has questions about the mass seen on the 10/11 ultrasound.  She would like to be on speakerphone if possible when Dr. Morley sees the patient.   Updated Dr. Morley and team on concerns.  "

## 2024-10-31 NOTE — PROGRESS NOTES
Wound Clinic Note         Visit date: 10/31/2024       Isabella Complaint:     Linda Loredo is a 79 year old  female had concerns including Wound Check (Right foot wound).  The patient has lower extremity edema and right leg ulcers         HISTORY OF PRESENT ILLNESS:    Linda Loredo reports the wound has been present since May 2024.  The wound began as a water blister that ruptured.   She reports she has had a number of wounds open and closed on both lower extremities.  She required admission to the hospital on July 10, 2024 and operative debridement of the right leg wound on July 14, 2024.  She continues to reside at the [facility].  Today we had the patient's daughter on speaker phone and they both provided portions of the interval history.    She is currently at a TCU and they have been applying the bandages once a day.  Today the patient has on alginate followed by superabsorbent pads and roll gauze and Ace wrap.  She reports the alginate has been sticking to the wound beds and is extremely painful to remove.  She does report that there has been less drainage from the wounds recently because she has been elevating her legs consistently.        The pateint denies fevers or chills.  They report the pain from the wound has been 3/10 and has decreased recently.      The patient reports laying down to elevate their legs above the level of their heart at least twice a day.  The patient confirms they do sleep in a bed.      Today the patient reports maintaining a high protein diet, but has not been taking protein supplements lately.        The patient denies a history of smoking or chronic steroid use.  The patient confirms having diabetes and reports the blood sugars are greater than 200 once every few days.         The patient has not had any symptoms of infection relating to the wound recently and is not currently on antibiotics.       Problem List:   Past Medical History:   Diagnosis Date     Abscess of left foot 10/31/2022    Adverse effect of anticoagulants, initial encounter 2024    GUSTAVO (acute kidney injury) (H) 2023    Anxiety 10/20/2011    Cellulitis - bilateral lower extremities 2023    Cellulitis of buttock 2023    Decubitus ulcers - 5 present on buttocks/perineal region, numerous scabbed over and unstagable on shin and bilateral feet with some bloody blisters noted on feet 2023    Depressive disorder, not elsewhere classified     Diabetic foot ulcer (H) 2023    Elevated liver enzymes 10/20/2011    Fracture of fibula, distal, left, closed 10/20/2011    ORIF 10/13/11      Herpes zoster 2005: On gabapentin and lidoderm. She has been switched from gabapentin to lyrica.       Herpes zoster without mention of complication     Hypercalcemia 2007    Hypoglycemia 2023    Hypotension 10/27/2011    Major depressive disorder, recurrent episode, moderate (H) 2008    Mild intermittent asthma     Mixed hyperlipidemia due to type 2 diabetes mellitus (H) 2008    Formatting of this note is different from the original.     22 ASCVD 10 year risk: 37.9%      Last Lipids:  Last Lipids:  Chol: 2021 130   63  HDL: 2021 46  Non-HDL: 2021 84  Chol/HDL Ratio: 2021 2.83  LDL DIRECT: . No results found in past 5 years   LDL CHOLESTEROL (mg/dL)   Date Value   2021 71      22   The 10-year ASCVD risk score ([place] SMITH Jr.    Non-healing wound of left heel 2023    Osteomyelitis (H) 10/24/2023    Other urinary incontinence     Sepsis without acute organ dysfunction, due to unspecified organism (H) 2023    Skin ulcer of right lower leg with fat layer exposed (H) 2024    Supratherapeutic INR 2023    Type II or unspecified type diabetes mellitus with renal manifestations, uncontrolled(250.42) (H)     Type II or unspecified type diabetes mellitus without mention of  complication, not stated as uncontrolled     Unspecified essential hypertension     Unspecified open wound, left foot, subsequent encounter 09/22/2023    UTI (urinary tract infection) - possible 05/26/2023    Warfarin-induced coagulopathy (H) 05/27/2023             Family Hx: family history includes Cancer in her maternal grandmother and paternal grandmother; Diabetes in her sister; Heart Disease in her father; Hypertension in her mother and sister; Psychotic Disorder in her sister; Respiratory in her sister.       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    Procedure: LEFT GUILLOTINE BELOW KNEE AMPUTATION.;  Surgeon: Lan Otto MD;  Location: [facility] OR    AMPUTATE LEG BELOW KNEE Left 10/14/2023    Procedure: debridement of left below the knee amputation;  Surgeon: Lan Otto MD;  Location:  OR    APPLY WOUND VAC Left 1/2/2024    Procedure: WOUND VAC APPLICATION TO LEFT BELOW KNEE STUMP;  Surgeon: Lan Otto MD;  Location:  OR    CHOLECYSTECTOMY      GRAFT SKIN SPLIT THICKNESS FROM TRUNK TO HEAD Left 1/2/2024    Procedure: APPLICATION OF SPLIT-THICKNESS SKIN GRAFT TO LEFT BELOW KNEE STUMP, GRAFT FROM LEFT THIGH;  Surgeon: Lan Otto MD;  Location:  OR    INCISION AND DRAINAGE FOOT, COMBINED Left 9/26/2023    Procedure: Surgical debridement of all nonviable skin and soft tissue and bone left foot;  Surgeon: Nolberto Thorne DPM;  Location: [facility] OR    IR LOWER EXTREMITY ANGIOGRAM LEFT  10/3/2023    IRRIGATION AND DEBRIDEMENT LOWER EXTREMITY, COMBINED Right 7/14/2024    Procedure: IRRIGATION AND DEBRIDEMENT, LOWER EXTREMITY, RIGHT LOWER LEG;  Surgeon: Phuong Gutierrez MD;  Location: [facility] OR    ligation of fallopian tube      OPEN REDUCTION INTERNAL FIXATION ANKLE  10/13/11    left    pinning of left 2nd toe            Allergies:    Allergies   Allergen Reactions    Prednisone Nausea and Vomiting     Morphine Nausea and Vomiting    Morphine [Fumaric Acid] Nausea and Vomiting    Nitrofurantoin Unknown     Per nursing home              Medication History:    Current Outpatient Medications   Medication Sig Dispense Refill    acetaminophen (TYLENOL) 500 MG tablet Take 2 tablets (1,000 mg) by mouth every 8 hours as needed for mild pain Pain 1-5/10      atorvastatin (LIPITOR) 40 MG tablet Take 1 tablet (40 mg) by mouth every evening      Benzocaine (HURRICAINE/TOPEX) 20 % AERO spray daily TOPICAL spray 20% daily with wound care      Insulin Aspart FlexPen 100 UNIT/ML SOPN       insulin degludec (TRESIBA) 200 UNIT/ML pen Inject 34 Units subcutaneously every morning Hold for BG < 100      melatonin 1 MG TABS tablet Take 1 tablet (1 mg) by mouth nightly as needed for sleep      methadone (DOLOPHINE) 5 MG tablet Take 1 tablet (5 mg) by mouth 3 times daily as needed for severe pain 30 tablet 0    methadone (DOLOPHINE) 5 MG tablet Take 1 tablet (5 mg) by mouth 4 times daily 120 tablet 0    metolazone (ZAROXOLYN) 2.5 MG tablet TAKE 1 TAB BY MOUTH ON THURSDAY FOR LYMPHEDEMA      miconazole (MICATIN) 2 % external powder Apply topically 2 times daily      mineral oil-hydrophilic petrolatum (AQUAPHOR) external ointment Apply to left thigh wound BID (Patient taking differently: 2 times daily. Apply to left thigh wound BID)      Multiple Vitamin (TAB-A-YUN) TABS Take 1 tablet by mouth daily (with lunch)      ondansetron (ZOFRAN ODT) 4 MG ODT tab Take 1 tablet (4 mg) by mouth every 6 hours as needed for nausea or vomiting      polyethylene glycol (MIRALAX) 17 GM/Dose powder Take 17 g by mouth 2 times daily as needed for constipation      potassium chloride ER (K-TAB) 20 MEQ CR tablet Take 20 mEq by mouth 2 times daily      senna-docusate (SENOKOT-S/PERICOLACE) 8.6-50 MG tablet Take 1 tablet by mouth 2 times daily as needed for constipation      torsemide (DEMADEX) 20 MG tablet Take 60 mg by mouth daily      WARFARIN SODIUM PO Take  by mouth daily Dosing in coordination with INR results  Full warfarin instructions: 7/26/24: Hold; Otherwise 1.5 mg every Mon, Wed, Fri; 3 mg all other days  Next INR check: 7/30/2024      wound support modular (EXPEDITE) LIQD bottle Take 60 mLs by mouth daily      Zinc oxide 10 % CREA Externally apply topically 3 times daily Apply to coccyx area once per shift after brief changes      acetic acid 1 % SOLN Apply topically daily With RLE wound cares (Patient not taking: Reported on 10/31/2024)      arginine (ARGINAID) PACK Take 1 packet by mouth daily In OJ (Patient not taking: Reported on 10/31/2024)      Menthol, Topical Analgesic, (BIOFREEZE EX) Apply 1 Application topically 3 times daily as needed Apply to neck and shoulders (Patient not taking: Reported on 10/31/2024)       Current Facility-Administered Medications   Medication Dose Route Frequency Provider Last Rate Last Admin    lidocaine (XYLOCAINE) 5 % ointment   Topical Continuous PRN Luis E Morley MD   New Bag at 09/30/24 0802         Tobacco History:  reports that she has never smoked. She has never used smokeless tobacco.       REVIEW OF SYMPTOMS:   The review of systems was negative except as noted in the HPI.           PHYSICAL EXAMINATION:     /77   Pulse 94   Temp 98.1  F (36.7  C)   Resp 16   SpO2 98%            GENERAL: The patient overall appears well and is no acute distress.   HEAD: normocephalic   EYES: Sclera and conjunctiva clear   NECK: no obvious masses   LUNGS: breathing is unlabored.   EXTREMITIES: No clubbing, cyanosis or edema   SKIN: No rashes or other abnormalities except as noted under the Wound section below.   NEUROLOGICAL: normal motor and sensory function   EDEMA: Sever       WOUND:   Today overall her right lower extremity is much improved compared with her last clinic visit with many fewer open areas on the right leg.  The right heel wound is still mostly scabbed over and the wounds on the right lateral foot  are stable.  There are no signs of infection, no periwound maceration and no areas of wet necrosis.        Also see below for wound details:       Circumferential volume measures:            9/9/2024     8:00 AM 9/30/2024     7:00 AM   Circumferential Measures   Right just above MTP 27.5 28.4   Right Ankle 32.8 33.5   Right Widest Calf 57 60       Ulceration(s)/Wound(s):   Please see the media tab under the chart review for pictures of the wounds.  Nursing staff removed dressings and cleansed wound.    VASC Wound Right leg (Active)   Pre Size Length 24 10/31/24 0905   Pre Size Width 57 10/31/24 0905   Pre Size Depth 0.3 10/31/24 0905   Pre Total Sq cm 1368 10/31/24 0905   Description scattered 10/31/24 0905       VASC Wound Right heel (Active)   Pre Size Length 2.5 10/31/24 0905   Pre Size Width 3 10/31/24 0905   Pre Size Depth 0.5 10/31/24 0905   Pre Total Sq cm 7.5 10/31/24 0905       VASC Wound Right lateral foot (Active)   Pre Size Length 3 10/31/24 0905   Pre Size Width 3 10/31/24 0905   Pre Size Depth 0.5 10/31/24 0905   Pre Total Sq cm 9 10/31/24 0905                   Recent Labs   Lab Test 01/02/24  0804 12/08/23  1226 09/23/23  0533   A1C 8.7* 7.6* 7.0*          Recent Labs   Lab Test 11/17/23  1033 11/01/23  0613 09/22/23  1941   ALBUMIN 3.8 3.2* 3.1*         WBC   Date Value Ref Range Status   02/22/2008 9.4 4.0 - 11.0 10e9/L Final     WBC Count   Date Value Ref Range Status   08/12/2024 7.3 4.0 - 11.0 10e3/uL Final     Albumin   Date Value Ref Range Status   07/29/2024 2.5 (L) 3.5 - 5.2 g/dL Final   02/22/2008 4.1 3.2 - 4.5 g/dL Final           No sharp debridement performed today.      The right leg wounds did not require sharp debridement.          ASSESSMENT:   This is a 79 year old  female with right leg ulcers, the patient also has lower extremity edema which was also managed during today's clinic visit.          PLAN:   The nurses at the skilled nursing facility will bandage the area beginning  with a Vashe soak followed by Xeroform, superabsorbent pads, short stretch compression wrap and a Tubigrip stocking all changed once or twice a day depending on the drainage.      I then reviewed the results of the venous insufficiency ultrasound performed on October 11, 2024.  This did show areas of superficial venous insufficiency and I recommended that she meet with a provider to discuss treatment options for her venous insufficiency.  She was in agreement with this and we will help her schedule an appointment with Dr. Palmer.  I then discussed the fact that there was a mass in the right groin noted on the ultrasound.  I discussed that we often see enlarged lymph nodes on these ultrasound studies.  When there is wounds on the legs the lymph nodes often become enlarged.  I also explained that is possible that this could be some type of a cancer.  To look into this more we would need to get a CT scan and possibly have the area biopsied.  The patient prefers to hold off on pursuing this option for now.        Separate from the wound care instructions we then discussed management strategies for lower extremity edema.  I explained the keys for managing lower extremity edema are compression and elevation.  I have encouraged the patient to continue to elevate the legs as they have been doing, including laying down with their legs above the level of the heart for 30 minutes at least twice a day.  I have also encouraged the patient to continue to sleep in a bed.     I have encouraged the patient to continue on their high protein diet to aid in wound healing.   The patient will return to the wound clinic in 4-5 weeks.        30 minutes spent on the date of the encounter doing chart review, history and exam, documentation and further activities per the note, this time excludes any procedure time      Luis E Morley MD  10/31/2024   9:08 AM   [facility]  [phone]    This note was  electronically signed by Luis E Morley MD

## 2024-10-31 NOTE — PATIENT INSTRUCTIONS
"Follow up with Dr. Morley in 4-5 weeks.    You have been scheduled to see Dr. Palmer the vein provider.       Wound Care Instructions      DAILY and as needed (IF DRAINAGE IS LEAKING THROUGH PLEASE CHANGE MORE THAN ONCE DAILY), Cleanse your right leg wound(s) with Vashe soak for 5-10 minutes.   Pat Dry with non-sterile gauze    Apply Lotion to the intact skin surrounding your wound and other dry skin locations. If using an adhesive dressing avoid using lotion near the adhesive.   Some good lotions include: Remedy Skin Repair Cream, Sarna, Vanicream or Cetaphil    Primary Dressing: Apply xeroform into/onto the wounds (If sticking to the wound when you are removing use saline to loosen)    Secondary dressing: Cover with super absorbant pad    Secure with non-sterile roll gauze (4\" x 75\" roll) and tape (1\" roll tape) as needed; avoid adhesive directly on the skin    Compression: NO ACE WRAPS!  Use tubular compression size G followed by Comprilan short stretch.  See below for more information on how to apply.     It is ok to get your wound wet in the bath or shower    SEEK MEDICAL CARE IF:  You have an increase in swelling, pain, or redness around the wound.  You have an increase in the amount of pus coming from the wound.  There is a bad smell coming from the wound.  The wound appears to be worsening/enlarging  You have a fever greater than 101.5 F      It is ok to continue current wound care treatment/products for the next 2-3 days until new wound care supplies are ordered and arrive. If longer than this please contact our office at [phone].    If for some reason you are not able to get your dressing(s) changed as outlined above (due to illness, lack of supplies, lack of help) please do the following: remove old, soiled dressings; wash the wounds with saline; pat dry; apply ABD pad or other absorbant pad and secure with rolled gauze; avoid tape directly on your skin; Call the clinic as soon as possible to " let us know what the current issues are in receiving wound care [phone].    If you have a 2 layer or 4 layer compression wrap on these are safe to have on for ONLY 7 days. If for some reason you are not able to get the wrap(s) changed (due to illness; lack of supplies, lack of help, lack of transportation) please do the following: unwrap the old 2 or 4 layer compression wrap; avoid using scissors as you could cut your skin and cause wounds; use tubular compression when available. Call to reschedule your home care or clinic visit appointment as soon as possible.    It is recommended that you elevate your legs throughout the day: approx 2-3 times each day  Elevate them above the level of your heart for 30 min.   Ways to do this:   Lay on the couch or your bed and prop your legs up on pillows   Recline back as far as you can go in your recliner and prop your legs on pillows.     Doing these things will help reduce the edema in your legs.      High Protein Foods  When you have an open ulcer, your bodies protein needs are much higher, so it is recommended eat good sources of protein or take a protein supplement!    Protein Supplements  -Premier Protein  -Ensure  -Boost  -Glucerna, if diabetic    Chicken  -Chicken breast, 3.5oz.-30 grams protein  -Chicken meat, cooked, 4 oz.    Beef  -Hamburger jonathan, 4 oz-28 grams protein  -Steak, 6 oz-42 grams  -Most cuts of beef- 7 grams of protein per ounce    Fish  -Most fish fillets or steaks are about 22 grams of protein for 3 1/2 oz(100 grams) of cooked fish, or 6 grams per ounce  -Tuna, 6 oz can-40 grams of protein    Pork  -Pork chop, average-22 grams protein  -Pork loin or tenderloin, 4 oz.-29 grams  -Ham, 3oz serving- 19 grams  -Wilkes, 1 slice-3 grams    Eggs and Dairy  -Egg, large-7 grams  -Milk, 1 cup-8 grams  -Cottage cheese, 1/2 cup-15 grams  -Greek yogurt, 1 cup-usually 8-12 grams, check label    Beans  -Soy milk, 1 cup-6-10 grams  -Most beans(black, harmon, lentils,  etc.) about 7-10 grams protein per half cup of cooked beans    Nuts and Seeds  -Peanut butter, 2 Tablespoons- 8 grams protein  -Almonds, 1/4 cup- 8 grams  -Peanuts, 1/4 cup-9 grams  -Sunflower seeds, 1/4 cup- 6 grams      Please NOTE: if you are 15 minutes late to your clinic appointment you will have to be rescheduled. Please call our clinic as soon as possible if you know you will not be able to get to your appointment at [phone].  If you fail to show up to 3 scheduled clinic appointments you will be dismissed from our clinic      We want to hear from you!  In the next few weeks, you should receive a call or email to complete a survey about your visit at [facility]. Please help us improve your appointment experience by letting us know how we did today. We strive to make your experience good and value any ways in which we could do better.      We value your input and suggestions.    Thank you for choosing the [facility]!

## 2024-11-06 ENCOUNTER — TELEPHONE (OUTPATIENT)
Dept: VASCULAR SURGERY | Facility: CLINIC | Age: 79
End: 2024-11-06
Payer: COMMERCIAL

## 2024-11-06 NOTE — TELEPHONE ENCOUNTER
Eleuterio ([phone]) at [facility] called with update on patient and requesting call back.    Patient's right leg wound bed has improved to a beefy red color using the xeroform, however now due to pain patient is refusing to use the xeroform due to pain with removal.   Yesterday's dressing change took 2.5 hours. Saline does not help loosen the xeroform because of the petroleum and warm shower did not help either.  Today, Eleuterio was able to convince patient to let him use an oil emulsion dressing on the wound (patient has used that in the past)    He also needs to know if Santyl should be continued to be used on foot wound on side of foot. She also has a new open area/eschar 2 cm distal to the wound.   He said the foot wounds look arterial although Dr. Otto reviewed the imaging from July and did not order any further testing.

## 2024-11-06 NOTE — TELEPHONE ENCOUNTER
"Left message for Eleuterio regarding Dr. Morley's response: \"The Xeroform is the least adhesive bandage that we have.  If they feel that the oil emulsion bandage that they used previously is less sticky then they are welcome to use that as a first contact layer on her legs.  Regarding using Santyl on the foot wounds I am not opposed to this although the eschar that was on the heel seemed fairly dry and densely adherent when I saw it so I prefer not to use Santyl there.\"  "

## 2024-11-07 ENCOUNTER — TELEPHONE (OUTPATIENT)
Dept: MULTI SPECIALTY CLINIC | Facility: CLINIC | Age: 79
End: 2024-11-07
Payer: COMMERCIAL

## 2024-11-07 NOTE — TELEPHONE ENCOUNTER
Spoke with Eleuterio at TCU. He is concerned that pt is in need of more vascular work-up and did reach out to Dr. Otto, vascular provider who she worked with prior in regards to her prior amputation and he is deferring to Dr. Morley, will not see at this time. He does report that pt has blood glucose running in 300's, not compliant with her diet, unwraps her legs and lets her leg hang down allowing increase in her swelling. She is in terrible pain with this leg and he is concerned she is heading toward amputation. He noted that her leg wound has improved significantly using xeroform which she  is now refusing because it as sticking to her causing pain.  Discussed results of prior CTA,.    Discussed with clinic vascular nurse, at this time to schedule consult with Dr. Strong for foot wound and will determine need for further vascular studies at this consult.  As is end of day and not able to reach Eleuterio for scheduling, routing to scheduling team to schedule visit    Appointment note: BED/ Possible [facility], Right foot non-healing wounds including eschar and necrosis, working with Dr. Morley for leg wounds, history of refusing Prevalon, dragging foot on floor, refusing cares (has extreme pain,) and significantly elevated blood glucose not diet complient, CTA from July on file (no additional testing for this appointment).    Lizabeth Botello RN, CWOCN

## 2024-11-07 NOTE — TELEPHONE ENCOUNTER
Do note pt had CTA in July showing 3 vessel run off to this foot. Discussed with Dr. Morley who reviewed encounter. He is recommending pt be seen in ED if concerns for infection or if facility unable to manage and requesting pt see Dr. Strong for foot wounds.     Call back to aleida Mcclellan message to call back.      Lizabeth Botello RN, CWOCN

## 2024-11-07 NOTE — TELEPHONE ENCOUNTER
Attn Dr. Morley s team  I am very concerned about a possible Avascular necrosis in her heel and side of the foot.  HP s NP citing Skin failure in presence of non-healing and WORSENING wounds on Rt Lateral fore-foot.   The distal area is new, as of this week.  Note: This patient is Diabetic, and not fully compliant with her plan of care as reported on the phone calls to your office.  The Dot in the wound is a 1 CM disc for my wound documentation program    Please advise.    Eleuterio Whatley RN  1st Floor TCU Clinical Manager  [facility]  [email or web address]

## 2024-11-10 ENCOUNTER — HEALTH MAINTENANCE LETTER (OUTPATIENT)
Age: 79
End: 2024-11-10

## 2024-11-11 PROBLEM — L97.912 SKIN ULCER OF RIGHT LOWER LEG WITH FAT LAYER EXPOSED (H): Status: RESOLVED | Noted: 2024-02-26 | Resolved: 2024-11-11

## 2024-11-11 NOTE — TELEPHONE ENCOUNTER
Shell Villegas called back to make sure we were aware of a new blister noted.  He states the pt pushed down the wraps on her leg and at the wrinkle/pressure point a new blister of approximately 3 cm popped up.  It is still in tact today.  He is aware the patient will be seeing Dr. Strong tomorrow for her feet, but wanted to make sure we were aware of the blister in case Dr. Strong wasn't planning on completely unwrapping her leg.  No call back needed, just sending as an FYI.

## 2024-11-12 ENCOUNTER — APPOINTMENT (OUTPATIENT)
Dept: ULTRASOUND IMAGING | Facility: CLINIC | Age: 79
DRG: 616 | End: 2024-11-12
Payer: MEDICARE

## 2024-11-12 ENCOUNTER — HOSPITAL ENCOUNTER (INPATIENT)
Facility: CLINIC | Age: 79
DRG: 616 | End: 2024-11-12
Attending: EMERGENCY MEDICINE
Payer: MEDICARE

## 2024-11-12 ENCOUNTER — PREP FOR PROCEDURE (OUTPATIENT)
Dept: OTHER | Facility: CLINIC | Age: 79
End: 2024-11-12
Payer: COMMERCIAL

## 2024-11-12 ENCOUNTER — HOSPITAL ENCOUNTER (OUTPATIENT)
Facility: CLINIC | Age: 79
End: 2024-11-12
Attending: PODIATRIST | Admitting: PODIATRIST
Payer: COMMERCIAL

## 2024-11-12 ENCOUNTER — OFFICE VISIT (OUTPATIENT)
Dept: VASCULAR SURGERY | Facility: CLINIC | Age: 79
End: 2024-11-12
Attending: PODIATRIST
Payer: COMMERCIAL

## 2024-11-12 ENCOUNTER — APPOINTMENT (OUTPATIENT)
Dept: MRI IMAGING | Facility: CLINIC | Age: 79
DRG: 616 | End: 2024-11-12
Payer: MEDICARE

## 2024-11-12 VITALS
SYSTOLIC BLOOD PRESSURE: 125 MMHG | TEMPERATURE: 98.3 F | HEART RATE: 110 BPM | RESPIRATION RATE: 18 BRPM | OXYGEN SATURATION: 97 % | DIASTOLIC BLOOD PRESSURE: 63 MMHG

## 2024-11-12 DIAGNOSIS — F11.20 OPIOID DEPENDENCE, UNCOMPLICATED (H): ICD-10-CM

## 2024-11-12 DIAGNOSIS — I48.20 CHRONIC ATRIAL FIBRILLATION (H): Chronic | ICD-10-CM

## 2024-11-12 DIAGNOSIS — R23.9 IMPAIRED SKIN INTEGRITY: ICD-10-CM

## 2024-11-12 DIAGNOSIS — L03.119 CELLULITIS AND ABSCESS OF FOOT EXCLUDING TOE: Primary | ICD-10-CM

## 2024-11-12 DIAGNOSIS — R11.0 NAUSEA: ICD-10-CM

## 2024-11-12 DIAGNOSIS — L02.619 CELLULITIS AND ABSCESS OF FOOT EXCLUDING TOE: Primary | ICD-10-CM

## 2024-11-12 DIAGNOSIS — E11.621 DIABETIC ULCER OF OTHER PART OF RIGHT FOOT ASSOCIATED WITH TYPE 2 DIABETES MELLITUS, WITH NECROSIS OF BONE (H): ICD-10-CM

## 2024-11-12 DIAGNOSIS — Z89.512 STATUS POST BELOW-KNEE AMPUTATION OF LEFT LOWER EXTREMITY (H): Chronic | ICD-10-CM

## 2024-11-12 DIAGNOSIS — E55.9 VITAMIN D DEFICIENCY: ICD-10-CM

## 2024-11-12 DIAGNOSIS — L03.115 CELLULITIS OF RIGHT LOWER EXTREMITY: ICD-10-CM

## 2024-11-12 DIAGNOSIS — G89.29 OTHER CHRONIC PAIN: Chronic | ICD-10-CM

## 2024-11-12 DIAGNOSIS — L97.514 DIABETIC ULCER OF OTHER PART OF RIGHT FOOT ASSOCIATED WITH TYPE 2 DIABETES MELLITUS, WITH NECROSIS OF BONE (H): ICD-10-CM

## 2024-11-12 DIAGNOSIS — K59.00 CONSTIPATION, UNSPECIFIED CONSTIPATION TYPE: ICD-10-CM

## 2024-11-12 DIAGNOSIS — L89.614 PRESSURE ULCER OF RIGHT HEEL, STAGE 4 (H): Primary | ICD-10-CM

## 2024-11-12 DIAGNOSIS — L89.614 PRESSURE ULCER OF RIGHT HEEL, STAGE 4 (H): ICD-10-CM

## 2024-11-12 DIAGNOSIS — Z89.511 STATUS POST BELOW-KNEE AMPUTATION OF RIGHT LOWER EXTREMITY (H): ICD-10-CM

## 2024-11-12 LAB
ANION GAP SERPL CALCULATED.3IONS-SCNC: 9 MMOL/L (ref 7–15)
ATRIAL RATE - MUSE: 101 BPM
BUN SERPL-MCNC: 24.5 MG/DL (ref 8–23)
CALCIUM SERPL-MCNC: 10.1 MG/DL (ref 8.8–10.4)
CHLORIDE SERPL-SCNC: 96 MMOL/L (ref 98–107)
CREAT SERPL-MCNC: 0.81 MG/DL (ref 0.51–0.95)
CRP SERPL-MCNC: 61.5 MG/L
DIASTOLIC BLOOD PRESSURE - MUSE: NORMAL MMHG
EGFRCR SERPLBLD CKD-EPI 2021: 73 ML/MIN/1.73M2
ERYTHROCYTE [DISTWIDTH] IN BLOOD BY AUTOMATED COUNT: 16.5 % (ref 10–15)
EST. AVERAGE GLUCOSE BLD GHB EST-MCNC: 189 MG/DL
GLUCOSE BLDC GLUCOMTR-MCNC: 161 MG/DL (ref 70–99)
GLUCOSE BLDC GLUCOMTR-MCNC: 84 MG/DL (ref 70–99)
GLUCOSE SERPL-MCNC: 149 MG/DL (ref 70–99)
HBA1C MFR BLD: 8.2 %
HCO3 SERPL-SCNC: 33 MMOL/L (ref 22–29)
HCT VFR BLD AUTO: 32.2 % (ref 35–47)
HGB BLD-MCNC: 9.8 G/DL (ref 11.7–15.7)
INR PPP: 1.69 (ref 0.85–1.15)
INTERPRETATION ECG - MUSE: NORMAL
LACTATE SERPL-SCNC: 1.5 MMOL/L (ref 0.7–2)
MCH RBC QN AUTO: 27.1 PG (ref 26.5–33)
MCHC RBC AUTO-ENTMCNC: 30.4 G/DL (ref 31.5–36.5)
MCV RBC AUTO: 89 FL (ref 78–100)
P AXIS - MUSE: NORMAL DEGREES
PLATELET # BLD AUTO: 326 10E3/UL (ref 150–450)
POTASSIUM SERPL-SCNC: 4 MMOL/L (ref 3.4–5.3)
PR INTERVAL - MUSE: NORMAL MS
QRS DURATION - MUSE: 78 MS
QT - MUSE: 312 MS
QTC - MUSE: 390 MS
R AXIS - MUSE: 5 DEGREES
RBC # BLD AUTO: 3.61 10E6/UL (ref 3.8–5.2)
SODIUM SERPL-SCNC: 138 MMOL/L (ref 135–145)
SYSTOLIC BLOOD PRESSURE - MUSE: NORMAL MMHG
T AXIS - MUSE: 52 DEGREES
TROPONIN T SERPL HS-MCNC: 38 NG/L
VENTRICULAR RATE- MUSE: 94 BPM
WBC # BLD AUTO: 9.4 10E3/UL (ref 4–11)

## 2024-11-12 PROCEDURE — 85610 PROTHROMBIN TIME: CPT | Performed by: EMERGENCY MEDICINE

## 2024-11-12 PROCEDURE — 87070 CULTURE OTHR SPECIMN AEROBIC: CPT | Performed by: PODIATRIST

## 2024-11-12 PROCEDURE — 87040 BLOOD CULTURE FOR BACTERIA: CPT | Performed by: EMERGENCY MEDICINE

## 2024-11-12 PROCEDURE — 93926 LOWER EXTREMITY STUDY: CPT | Mod: RT

## 2024-11-12 PROCEDURE — 250N000011 HC RX IP 250 OP 636: Performed by: EMERGENCY MEDICINE

## 2024-11-12 PROCEDURE — 83605 ASSAY OF LACTIC ACID: CPT | Performed by: EMERGENCY MEDICINE

## 2024-11-12 PROCEDURE — 86140 C-REACTIVE PROTEIN: CPT | Performed by: EMERGENCY MEDICINE

## 2024-11-12 PROCEDURE — 11044 DBRDMT BONE 1ST 20 SQ CM/<: CPT | Performed by: PODIATRIST

## 2024-11-12 PROCEDURE — 80048 BASIC METABOLIC PNL TOTAL CA: CPT | Performed by: EMERGENCY MEDICINE

## 2024-11-12 PROCEDURE — 11046 DBRDMT MUSC&/FSCA EA ADDL: CPT | Performed by: PODIATRIST

## 2024-11-12 PROCEDURE — 93922 UPR/L XTREMITY ART 2 LEVELS: CPT | Mod: 52

## 2024-11-12 PROCEDURE — 36415 COLL VENOUS BLD VENIPUNCTURE: CPT

## 2024-11-12 PROCEDURE — 87075 CULTR BACTERIA EXCEPT BLOOD: CPT | Performed by: PODIATRIST

## 2024-11-12 PROCEDURE — 96361 HYDRATE IV INFUSION ADD-ON: CPT

## 2024-11-12 PROCEDURE — 36415 COLL VENOUS BLD VENIPUNCTURE: CPT | Performed by: EMERGENCY MEDICINE

## 2024-11-12 PROCEDURE — 99285 EMERGENCY DEPT VISIT HI MDM: CPT | Mod: 25

## 2024-11-12 PROCEDURE — 0QBL0ZZ EXCISION OF RIGHT TARSAL, OPEN APPROACH: ICD-10-PCS | Performed by: PODIATRIST

## 2024-11-12 PROCEDURE — 73718 MRI LOWER EXTREMITY W/O DYE: CPT | Mod: RT

## 2024-11-12 PROCEDURE — G0463 HOSPITAL OUTPT CLINIC VISIT: HCPCS | Performed by: PODIATRIST

## 2024-11-12 PROCEDURE — 85027 COMPLETE CBC AUTOMATED: CPT | Performed by: EMERGENCY MEDICINE

## 2024-11-12 PROCEDURE — 99203 OFFICE O/P NEW LOW 30 MIN: CPT | Mod: 25 | Performed by: PODIATRIST

## 2024-11-12 PROCEDURE — 73721 MRI JNT OF LWR EXTRE W/O DYE: CPT | Mod: RT

## 2024-11-12 PROCEDURE — 93005 ELECTROCARDIOGRAM TRACING: CPT

## 2024-11-12 PROCEDURE — 84484 ASSAY OF TROPONIN QUANT: CPT

## 2024-11-12 PROCEDURE — 82565 ASSAY OF CREATININE: CPT | Performed by: EMERGENCY MEDICINE

## 2024-11-12 PROCEDURE — 83036 HEMOGLOBIN GLYCOSYLATED A1C: CPT

## 2024-11-12 PROCEDURE — 96365 THER/PROPH/DIAG IV INF INIT: CPT | Mod: 59

## 2024-11-12 PROCEDURE — 11043 DBRDMT MUSC&/FSCA 1ST 20/<: CPT | Performed by: PODIATRIST

## 2024-11-12 PROCEDURE — 120N000001 HC R&B MED SURG/OB

## 2024-11-12 PROCEDURE — 250N000011 HC RX IP 250 OP 636

## 2024-11-12 PROCEDURE — 82962 GLUCOSE BLOOD TEST: CPT

## 2024-11-12 RX ORDER — ACETAMINOPHEN 650 MG/1
650 SUPPOSITORY RECTAL EVERY 4 HOURS PRN
Status: DISCONTINUED | OUTPATIENT
Start: 2024-11-12 | End: 2024-11-15

## 2024-11-12 RX ORDER — DEXTROSE MONOHYDRATE 25 G/50ML
25-50 INJECTION, SOLUTION INTRAVENOUS
Status: DISCONTINUED | OUTPATIENT
Start: 2024-11-12 | End: 2024-11-18 | Stop reason: HOSPADM

## 2024-11-12 RX ORDER — PROCHLORPERAZINE 25 MG
12.5 SUPPOSITORY, RECTAL RECTAL EVERY 12 HOURS PRN
Status: DISCONTINUED | OUTPATIENT
Start: 2024-11-12 | End: 2024-11-18 | Stop reason: HOSPADM

## 2024-11-12 RX ORDER — POLYETHYLENE GLYCOL 3350 17 G/17G
17 POWDER, FOR SOLUTION ORAL 2 TIMES DAILY PRN
Status: DISCONTINUED | OUTPATIENT
Start: 2024-11-12 | End: 2024-11-18 | Stop reason: HOSPADM

## 2024-11-12 RX ORDER — PROCHLORPERAZINE MALEATE 5 MG/1
5 TABLET ORAL EVERY 6 HOURS PRN
Status: DISCONTINUED | OUTPATIENT
Start: 2024-11-12 | End: 2024-11-18 | Stop reason: HOSPADM

## 2024-11-12 RX ORDER — AMOXICILLIN 250 MG
1 CAPSULE ORAL 2 TIMES DAILY PRN
Status: DISCONTINUED | OUTPATIENT
Start: 2024-11-12 | End: 2024-11-18 | Stop reason: HOSPADM

## 2024-11-12 RX ORDER — PIPERACILLIN SODIUM, TAZOBACTAM SODIUM 3; .375 G/15ML; G/15ML
3.38 INJECTION, POWDER, LYOPHILIZED, FOR SOLUTION INTRAVENOUS ONCE
Status: COMPLETED | OUTPATIENT
Start: 2024-11-12 | End: 2024-11-12

## 2024-11-12 RX ORDER — POTASSIUM CHLORIDE 1500 MG/1
20 TABLET, EXTENDED RELEASE ORAL 2 TIMES DAILY
Status: DISCONTINUED | OUTPATIENT
Start: 2024-11-12 | End: 2024-11-18 | Stop reason: HOSPADM

## 2024-11-12 RX ORDER — CALCIUM CARBONATE 500 MG/1
1000 TABLET, CHEWABLE ORAL 4 TIMES DAILY PRN
Status: DISCONTINUED | OUTPATIENT
Start: 2024-11-12 | End: 2024-11-18 | Stop reason: HOSPADM

## 2024-11-12 RX ORDER — AMOXICILLIN 250 MG
2 CAPSULE ORAL 2 TIMES DAILY PRN
Status: DISCONTINUED | OUTPATIENT
Start: 2024-11-12 | End: 2024-11-18 | Stop reason: HOSPADM

## 2024-11-12 RX ORDER — NALOXONE HYDROCHLORIDE 0.4 MG/ML
0.4 INJECTION, SOLUTION INTRAMUSCULAR; INTRAVENOUS; SUBCUTANEOUS
Status: DISCONTINUED | OUTPATIENT
Start: 2024-11-12 | End: 2024-11-18 | Stop reason: HOSPADM

## 2024-11-12 RX ORDER — WARFARIN SODIUM 1 MG/1
1-2 TABLET ORAL SEE ADMIN INSTRUCTIONS
Status: DISCONTINUED | OUTPATIENT
Start: 2024-11-12 | End: 2024-11-15

## 2024-11-12 RX ORDER — ACETAMINOPHEN 325 MG/1
650 TABLET ORAL EVERY 4 HOURS PRN
Status: DISCONTINUED | OUTPATIENT
Start: 2024-11-12 | End: 2024-11-15

## 2024-11-12 RX ORDER — ATORVASTATIN CALCIUM 40 MG/1
40 TABLET, FILM COATED ORAL EVERY EVENING
Status: DISCONTINUED | OUTPATIENT
Start: 2024-11-12 | End: 2024-11-18 | Stop reason: HOSPADM

## 2024-11-12 RX ORDER — HYDROMORPHONE HYDROCHLORIDE 1 MG/ML
0.5 INJECTION, SOLUTION INTRAMUSCULAR; INTRAVENOUS; SUBCUTANEOUS
Status: DISCONTINUED | OUTPATIENT
Start: 2024-11-12 | End: 2024-11-14

## 2024-11-12 RX ORDER — GADOBUTROL 604.72 MG/ML
10 INJECTION INTRAVENOUS ONCE
Status: DISCONTINUED | OUTPATIENT
Start: 2024-11-12 | End: 2024-11-13 | Stop reason: DRUGHIGH

## 2024-11-12 RX ORDER — LIDOCAINE 40 MG/G
CREAM TOPICAL
Status: DISCONTINUED | OUTPATIENT
Start: 2024-11-12 | End: 2024-11-18 | Stop reason: HOSPADM

## 2024-11-12 RX ORDER — NALOXONE HYDROCHLORIDE 0.4 MG/ML
0.2 INJECTION, SOLUTION INTRAMUSCULAR; INTRAVENOUS; SUBCUTANEOUS
Status: DISCONTINUED | OUTPATIENT
Start: 2024-11-12 | End: 2024-11-18 | Stop reason: HOSPADM

## 2024-11-12 RX ORDER — LIDOCAINE 4 G/G
1 PATCH TOPICAL DAILY PRN
Status: ON HOLD | COMMUNITY
End: 2024-11-18

## 2024-11-12 RX ORDER — HYDROMORPHONE HYDROCHLORIDE 1 MG/ML
0.5 INJECTION, SOLUTION INTRAMUSCULAR; INTRAVENOUS; SUBCUTANEOUS ONCE
Status: COMPLETED | OUTPATIENT
Start: 2024-11-12 | End: 2024-11-12

## 2024-11-12 RX ORDER — CEFAZOLIN SODIUM/WATER 2 G/20 ML
2 SYRINGE (ML) INTRAVENOUS SEE ADMIN INSTRUCTIONS
Status: CANCELLED | OUTPATIENT
Start: 2024-11-14

## 2024-11-12 RX ORDER — CEFAZOLIN SODIUM/WATER 2 G/20 ML
2 SYRINGE (ML) INTRAVENOUS
Status: CANCELLED | OUTPATIENT
Start: 2024-11-14

## 2024-11-12 RX ORDER — HYDROMORPHONE HYDROCHLORIDE 1 MG/ML
0.3 INJECTION, SOLUTION INTRAMUSCULAR; INTRAVENOUS; SUBCUTANEOUS
Status: DISCONTINUED | OUTPATIENT
Start: 2024-11-12 | End: 2024-11-14

## 2024-11-12 RX ORDER — PIPERACILLIN SODIUM, TAZOBACTAM SODIUM 3; .375 G/15ML; G/15ML
3.38 INJECTION, POWDER, LYOPHILIZED, FOR SOLUTION INTRAVENOUS EVERY 8 HOURS
Status: DISCONTINUED | OUTPATIENT
Start: 2024-11-12 | End: 2024-11-15

## 2024-11-12 RX ORDER — ONDANSETRON 4 MG/1
4 TABLET, ORALLY DISINTEGRATING ORAL EVERY 6 HOURS PRN
Status: DISCONTINUED | OUTPATIENT
Start: 2024-11-12 | End: 2024-11-18 | Stop reason: HOSPADM

## 2024-11-12 RX ORDER — NICOTINE POLACRILEX 4 MG
15-30 LOZENGE BUCCAL
Status: DISCONTINUED | OUTPATIENT
Start: 2024-11-12 | End: 2024-11-18 | Stop reason: HOSPADM

## 2024-11-12 RX ORDER — ONDANSETRON 2 MG/ML
4 INJECTION INTRAMUSCULAR; INTRAVENOUS EVERY 6 HOURS PRN
Status: DISCONTINUED | OUTPATIENT
Start: 2024-11-12 | End: 2024-11-18 | Stop reason: HOSPADM

## 2024-11-12 RX ORDER — TORSEMIDE 20 MG/1
60 TABLET ORAL DAILY
Status: DISCONTINUED | OUTPATIENT
Start: 2024-11-13 | End: 2024-11-18 | Stop reason: HOSPADM

## 2024-11-12 RX ORDER — METHADONE HYDROCHLORIDE 5 MG/1
5 TABLET ORAL 4 TIMES DAILY
Status: DISCONTINUED | OUTPATIENT
Start: 2024-11-12 | End: 2024-11-18 | Stop reason: HOSPADM

## 2024-11-12 RX ORDER — HYDROMORPHONE HYDROCHLORIDE 2 MG/1
4 TABLET ORAL DAILY PRN
Status: ON HOLD | COMMUNITY
End: 2024-11-18

## 2024-11-12 RX ADMIN — HYDROMORPHONE HYDROCHLORIDE 0.5 MG: 1 INJECTION, SOLUTION INTRAMUSCULAR; INTRAVENOUS; SUBCUTANEOUS at 23:08

## 2024-11-12 RX ADMIN — PIPERACILLIN AND TAZOBACTAM 3.38 G: 3; .375 INJECTION, POWDER, LYOPHILIZED, FOR SOLUTION INTRAVENOUS; PARENTERAL at 14:42

## 2024-11-12 RX ADMIN — HYDROMORPHONE HYDROCHLORIDE 0.5 MG: 1 INJECTION, SOLUTION INTRAMUSCULAR; INTRAVENOUS; SUBCUTANEOUS at 19:10

## 2024-11-12 RX ADMIN — Medication 2000 MG: at 15:43

## 2024-11-12 RX ADMIN — HYDROMORPHONE HYDROCHLORIDE 0.5 MG: 1 INJECTION, SOLUTION INTRAMUSCULAR; INTRAVENOUS; SUBCUTANEOUS at 21:53

## 2024-11-12 ASSESSMENT — ACTIVITIES OF DAILY LIVING (ADL)
ADLS_ACUITY_SCORE: 0

## 2024-11-12 ASSESSMENT — COLUMBIA-SUICIDE SEVERITY RATING SCALE - C-SSRS
2. HAVE YOU ACTUALLY HAD ANY THOUGHTS OF KILLING YOURSELF IN THE PAST MONTH?: NO
1. IN THE PAST MONTH, HAVE YOU WISHED YOU WERE DEAD OR WISHED YOU COULD GO TO SLEEP AND NOT WAKE UP?: NO
6. HAVE YOU EVER DONE ANYTHING, STARTED TO DO ANYTHING, OR PREPARED TO DO ANYTHING TO END YOUR LIFE?: NO

## 2024-11-12 NOTE — H&P
[facility]    History and Physical - Hospitalist Service       Date of Admission:  11/12/2024    Assessment & Plan      Linda Loredo is a 79 year old female admitted on 11/12/2024. She has a history of T2DM on Tresiba, paroxysmal A-fib, CHF, fibromyalgia, HLD, HTN, anemia of chronic disease, lymphedema, and is admitted for cellulitis of a diabetic foot ulcer. Plan to bring to the OR tomorrow for debridement per patient's podiatrist Dr. Strong.    Cellulitis of diabetic foot ulcer with necrosis of the bone  Pressure ulcer of the right heel, stage 4  Follows with wound care clinic regularly over the past 2 months, seen in clinic today by Dr. Strong who had concern for cellulitis/osteo given large eschar and purulence, was able to probe to bone. Recommended presentation to the ED, informed patient they may require partial amputation.  Dr. Strong communicated with the ED and recommended Vanc/Zosyn, MRI right foot and ankle, n.p.o. at midnight, scheduled debridement case for tomorrow.  Additionally, placed ID, vascular surgery, podiatry consults per Dr. Strong's request.  Will perform preoperative assessment as able given that many of her chronic conditions have not been appropriately evaluated in some time.  -Podiatry, vascular surgery, ID consulted as requested.  Appreciate expertise  -Vanc/Zosyn IV  -Hold warfarin overnight  -EKG  -A1c  -Trop  -Echo ordered, may need to be performed following the procedure.  Echo not being performed should not preclude her from obtaining her debridement.  -Previously on methadone although awaiting med rec, IV Dilaudid for pain management as ordered.  -Tylenol  -N.p.o. at midnight  -Blood cultures pending    T2DM, uncontrolled  Diabetic neuropathy  CKD 3  Last A1c of 9.9 in  3/1/2024, follows with endo for management last seen 04/2024, follows with wound clinic regularly for right leg diabetic ulcers.  Per med rec taking Tresiba 34 units every  morning, no other agents. Cr 0.81.  -Continue Tresiba 28 units every morning  -Medium sliding scale insulin  -A1c, BMP, microalb    Chronic pain syndrome  Per patient reports she is taking methadone at home, however dispense report does not show any methadone dispensed since 07/2024.  Awaiting med rec prior to restarting methadone.    Paroxysmal atrial fibrillation  Last visit with cardiology was at [facility] on 9/16/2022, placed on warfarin given [id number] = 7 (>75, F, CHF, HTN, vasc dz, DM).  On warfarin.  No rate control medications.  -Hold warfarin, going to OR tomorrow for debridement  -Consider cardiac telemetry should patient become symptomatic with chest pain or shortness of breath or pending EKG results  -AM INR  -Echo    Chronic right-sided heart failure  Diagnosis found in chart on recent admission in 07/28/24, no recent echo, no GDMT medications.  -Echo  -Continue PTA metolazone and torsemide      Chronic conditions:  Chronic normocytic anemia - at baseline of 9-10.  HTN -not on any antihypertensives, /66 in the ED  HLD- PTA statin  Lymphedema - PTA torsemide          Diet: NPO for Medical/Clinical Reasons Except for: Ice Chips  Combination Diet Moderate Consistent Carb (60 g CHO per Meal) Diet; Low Saturated Fat Na <2400mg Diet, No Caffeine Diet  NPO per Anesthesia Guidelines for Procedure/Surgery Except for: Meds    DVT Prophylaxis: None - holding warfarin, going to OR tomorrow  Braga Catheter: Not present  Fluids: PO  Lines: None     Cardiac Monitoring: None  Code Status: Full Code    Clinically Significant Risk Factors Present on Admission          # Hypochloremia: Lowest Cl = 96 mmol/L in last 2 days, will monitor as appropriate         # Drug Induced Coagulation Defect: home medication list includes an anticoagulant medication    # Hypertension: Noted on problem list      # Anemia: based on hgb <11           # Financial/Environmental Concerns:           Disposition Plan      Expected  "Discharge Date: 11/14/2024                The patient's care was discussed with the Attending Physician, Dr. Jimmy Ann .      Milo Sawyer DO  Hospitalist Service  [facility]  Securely message with [employer] (more info)  Text page via [employer] Paging/Directory   ______________________________________________________________________    Chief Complaint   R foot pain    History is obtained from the patient and patient's daughter    History of Present Illness   Linda Loredo is a 79 year old female admitted on 11/12/2024. She has a history of T2DM on Tresiba, paroxysmal A-fib, CHF, fibromyalgia, HLD, HTN, anemia of chronic disease, lymphedema, and is admitted for cellulitis of a diabetic foot ulcer.     She reports that her foot has hurt \" for months\" and was seen in wound care clinic today.  Her podiatrist noted that there was a large eschar on the right heel that had some purulence and he was able to probe to the bone.  Concern for cellulitis and/or osteomyelitis.  He contacted the ED and requested she be admitted for debridement in the morning.  Per his note, he states that he informed the patient that she may need partial amputation.    She denies any fevers or chills.  She denies any chest pain or shortness of breath.  She is unsure when the last time she had atrial fibrillation or heart failure evaluated.  She is not sure when her last A1c was obtained.  Has not checked her sugars.  On Tresiba, states she is compliant.  Followed with endocrinology in April of this year for T2DM.      Unable to obtain more history as patient was sent for MRI.      Past Medical History    Past Medical History:   Diagnosis Date    Abscess of left foot 10/31/2022    Acute cystitis without hematuria 07/11/2024    Acute kidney injury (H) 07/11/2024    Adverse effect of anticoagulants, initial encounter 04/16/2024    GUSTAVO (acute kidney injury) (H) 05/26/2023    Allergic rhinitis 04/08/2008    Anemia " 07/11/2024    Anticoagulation monitoring, INR range 2-3 07/06/2022    Anxiety 10/20/2011    Cardiac murmur, unspecified 05/29/2019    Cellulitis - bilateral lower extremities 05/27/2023    Cellulitis of buttock 05/26/2023    Chronic atrial fibrillation (H) 05/30/2022    New onset during 5/2022 admission      Chronic kidney disease, stage 3b (H) 06/12/2024    Chronic pain 04/26/2010    Chronic right-sided heart failure (H) 11/16/2023    Constipation 04/16/2024    Decubitus ulcers - 5 present on buttocks/perineal region, numerous scabbed over and unstagable on shin and bilateral feet with some bloody blisters noted on feet 05/27/2023    Depressive disorder, not elsewhere classified     Diabetic foot ulcer (H) 09/29/2023    Diabetic polyneuropathy associated with type 2 diabetes mellitus (H) 04/07/2006 February 26, 2007: on gabapentin.  She has been switched from gabapentin to lyrica.       Elevated liver enzymes 10/20/2011    Essential hypertension with goal blood pressure less than 140/90 05/09/2005    Fibromyalgia 10/20/2011    Fracture of fibula, distal, left, closed 10/20/2011    ORIF 10/13/11      Herpes zoster 06/20/2005 February 26, 2007: On gabapentin and lidoderm. She has been switched from gabapentin to lyrica.       Herpes zoster without mention of complication     Hx of osteomyelitis 04/16/2024    S.p left BKA 2023      Hypercalcemia 02/24/2007    Hypoglycemia 05/27/2023    Hypotension 10/27/2011    Insomnia 07/15/2005    Lymphedema, not elsewhere classified 02/09/2024    Major depressive disorder, recurrent episode, moderate (H) 04/08/2008    Metabolic encephalopathy 07/11/2024    Microalbuminuria due to type 2 diabetes mellitus (H) 10/25/2016    Mild intermittent asthma     Mixed hyperlipidemia 12/05/2005    Mixed hyperlipidemia due to type 2 diabetes mellitus (H) 04/08/2008    Formatting of this note is different from the original.     07/20/22 ASCVD 10 year risk: 37.9%      Last Lipids:  Last  Lipids:  Chol: 2021 130   63  HDL: 2021 46  Non-HDL: 2021 84  Chol/HDL Ratio: 2021 2.83  LDL DIRECT: . No results found in past 5 years   LDL CHOLESTEROL (mg/dL)   Date Value   2021 71      22   The 10-year ASCVD risk score (Teddy LUIS Jr.    Mixed stress and urge urinary incontinence 2005: On Detrol LA.      Non-healing wound of left heel 2023    Obesity with BMI >35 and comorbidity 2006: Body mass index is 48.07 kg/(m^2).       Opioid dependence, uncomplicated (H) 2023    BERLIN (obstructive sleep apnea) 10/23/2007    Sleep study 2004 reportedly showing severe OBSTRUCTIVE SLEEP APNEA and recommend CPAP, Not available for review. Patient is untreated       Osteoarthritis 2006    Osteomyelitis (H) 10/24/2023    Osteopenia 2015    Other abnormalities of gait and mobility 11/15/2023    Other urinary incontinence     Pressure injury of deep tissue of sacral region 2024    Primary hyperparathyroidism (H) 2013    Work up 2013 Dr. 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       Supratherapeutic INR 2023    Type 2 diabetes mellitus with complication, with long-term current use of insulin (H) 2005    diagnosed in        Type II or unspecified type diabetes mellitus with renal manifestations, uncontrolled(250.42) (H)     Type II or unspecified  type diabetes mellitus without mention of complication, not stated as uncontrolled     Umbilical hernia without obstruction and without gangrene 12/13/2018    Unspecified essential hypertension     Unspecified open wound, left foot, subsequent encounter 09/22/2023    UTI (urinary tract infection) - possible 05/26/2023    Venous stasis 04/16/2024    Vitamin D deficiency 09/08/2022    Warfarin-induced coagulopathy (H) 05/27/2023       Past Surgical History   Past Surgical History:   Procedure Laterality Date    AMPUTATE LEG BELOW KNEE Left 10/7/2023    Procedure: LEFT GUILLOTINE BELOW KNEE AMPUTATION.;  Surgeon: Lan Otto MD;  Location: [facility] OR    AMPUTATE LEG BELOW KNEE Left 10/14/2023    Procedure: debridement of left below the knee amputation;  Surgeon: Lan Otto MD;  Location:  OR    APPLY WOUND VAC Left 1/2/2024    Procedure: WOUND VAC APPLICATION TO LEFT BELOW KNEE STUMP;  Surgeon: Lan Otto MD;  Location:  OR    CHOLECYSTECTOMY      GRAFT SKIN SPLIT THICKNESS FROM TRUNK TO HEAD Left 1/2/2024    Procedure: APPLICATION OF SPLIT-THICKNESS SKIN GRAFT TO LEFT BELOW KNEE STUMP, GRAFT FROM LEFT THIGH;  Surgeon: Lan Otto MD;  Location:  OR    INCISION AND DRAINAGE FOOT, COMBINED Left 9/26/2023    Procedure: Surgical debridement of all nonviable skin and soft tissue and bone left foot;  Surgeon: Nolberto Thorne DPM;  Location: [facility] OR    IR LOWER EXTREMITY ANGIOGRAM LEFT  10/3/2023    IRRIGATION AND DEBRIDEMENT LOWER EXTREMITY, COMBINED Right 7/14/2024    Procedure: IRRIGATION AND DEBRIDEMENT, LOWER EXTREMITY, RIGHT LOWER LEG;  Surgeon: Phuong Gutierrez MD;  Location: [facility] OR    ligation of fallopian tube      OPEN REDUCTION INTERNAL FIXATION ANKLE  10/13/11    left    pinning of left 2nd toe         Prior to Admission Medications   Prior to Admission Medications   Prescriptions Last Dose Informant Patient Reported? Taking?   Benzocaine (HURRICAINE/TOPEX) 20 %  AERO spray   Yes No   Sig: daily TOPICAL spray 20% daily with wound care   Insulin Aspart FlexPen 100 UNIT/ML SOPN   Yes No   Menthol, Topical Analgesic, (BIOFREEZE EX)  Nursing Home Yes No   Sig: Apply 1 Application topically 3 times daily as needed Apply to neck and shoulders   Patient not taking: Reported on 10/31/2024   Multiple Vitamin (TAB-A-YUN) TABS  Nursing Home Yes No   Sig: Take 1 tablet by mouth daily (with lunch)   WARFARIN SODIUM PO 11/11/2024 Nursing Home Yes Yes   Sig: Take by mouth daily Dosing in coordination with INR results  Full warfarin instructions: 7/26/24: Hold; Otherwise 1.5 mg every Mon, Wed, Fri; 3 mg all other days  Next INR check: 7/30/2024   Zinc oxide 10 % CREA  Nursing Home Yes No   Sig: Externally apply topically 3 times daily Apply to coccyx area once per shift after brief changes   acetaminophen (TYLENOL) 500 MG tablet   No No   Sig: Take 2 tablets (1,000 mg) by mouth every 8 hours as needed for mild pain Pain 1-5/10   acetic acid 1 % SOLN   Yes No   Sig: Apply topically daily With RLE wound cares   Patient not taking: Reported on 11/12/2024   arginine (ARGINAID) PACK   Yes No   Sig: Take 1 packet by mouth daily In OJ   Patient not taking: Reported on 11/12/2024   atorvastatin (LIPITOR) 40 MG tablet  Nursing Home No No   Sig: Take 1 tablet (40 mg) by mouth every evening   insulin degludec (TRESIBA) 200 UNIT/ML pen   Yes No   Sig: Inject 34 Units subcutaneously every morning Hold for BG < 100   melatonin 1 MG TABS tablet  Nursing Home No No   Sig: Take 1 tablet (1 mg) by mouth nightly as needed for sleep   methadone (DOLOPHINE) 5 MG tablet   No No   Sig: Take 1 tablet (5 mg) by mouth 3 times daily as needed for severe pain   methadone (DOLOPHINE) 5 MG tablet   No No   Sig: Take 1 tablet (5 mg) by mouth 4 times daily   metolazone (ZAROXOLYN) 2.5 MG tablet   Yes No   Sig: TAKE 1 TAB BY MOUTH ON THURSDAY FOR LYMPHEDEMA   miconazole (MICATIN) 2 % external powder  Nursing Home No No    Sig: Apply topically 2 times daily   mineral oil-hydrophilic petrolatum (AQUAPHOR) external ointment  Nursing Home No No   Sig: Apply to left thigh wound BID   ondansetron (ZOFRAN ODT) 4 MG ODT tab  Nursing Home No No   Sig: Take 1 tablet (4 mg) by mouth every 6 hours as needed for nausea or vomiting   polyethylene glycol (MIRALAX) 17 GM/Dose powder  Nursing Home No No   Sig: Take 17 g by mouth 2 times daily as needed for constipation   potassium chloride ER (K-TAB) 20 MEQ CR tablet  Nursing Home Yes No   Sig: Take 20 mEq by mouth 2 times daily   senna-docusate (SENOKOT-S/PERICOLACE) 8.6-50 MG tablet  Nursing Home No No   Sig: Take 1 tablet by mouth 2 times daily as needed for constipation   torsemide (DEMADEX) 20 MG tablet   Yes No   Sig: Take 60 mg by mouth daily   wound support modular (EXPEDITE) LIQD bottle   No No   Sig: Take 60 mLs by mouth daily      Facility-Administered Medications Last Administration Doses Remaining   lidocaine (XYLOCAINE) 5 % ointment 9/30/2024  8:02 AM             Physical Exam   Vital Signs: Temp: 97.9  F (36.6  C) Temp src: Oral BP: 125/66 Pulse: 103   Resp: 18 SpO2: 98 % O2 Device: None (Room air)    Weight: 230 lbs 0 oz  Constitutional: Awake, alert, cooperative, no apparent distress.  Somewhat anxious.  Eyes: Lids and lashes normal, extra ocular muscles intact, sclera clear, conjunctiva normal.  ENT: Normocephalic, atraumatic. Moist mucous membranes.  Respiratory: No increased work of breathing, no accessory muscle use, clear to auscultation bilaterally, no crackles or wheezing.  Cardiovascular: Regular rate and rhythm, normal S1 and S2, no S3 or S4, and no murmur noted  GI: Abdomen soft, non-tender, non-distended, no masses palpated.  Musculoskeletal: LLE BKA.  RLE wrapped with gauze, without drainage.  Neurologic: Awake, alert, oriented to name, place and time.      Medical Decision Making   Please see A&P for additional details of medical decision making.      Data    ------------------------- PAST 24 HR DATA REVIEWED -----------------------------------------------    I have personally reviewed the following data over the past 24 hrs:    9.4  \   9.8 (L)   / 326     138 96 (L) 24.5 (H) /  149 (H)   4.0 33 (H) 0.81 \     Procal: N/A CRP: 61.50 (H) Lactic Acid: 1.5       INR:  1.69 (H) PTT:  N/A   D-dimer:  N/A Fibrinogen:  N/A       Imaging results reviewed over the past 24 hrs:   No results found for this or any previous visit (from the past 24 hours).

## 2024-11-12 NOTE — ED NOTES
Expected Patient Referral to ED  1:05 PM    Referring Clinic/Provider:  Podiatry Clinic RN    Reason for referral/Clinical facts:  Dr. Strong referring patient to the ED for a foot infection on the right.  Would appreciate MRI with/without contrast of the right foot and ankle, initiation on antibiotics, likely vancomycin and Zosyn, consultations with ID, vascular, podiatry, and admission.  Keep patient n.p.o. for now.    Recommendations provided:  Send to ED for further evaluation        Jose Kinney MD  [facility] EMERGENCY ROOM  [address] [place] [address] [phone] [phone]       Jose Kinney MD  11/12/24 [id number]

## 2024-11-12 NOTE — PHARMACY-VANCOMYCIN DOSING SERVICE
Pharmacy Vancomycin Initial Note  Date of Service 2024  Patient's  1945  79 year old, female    Indication: Skin and Soft Tissue Infection    Current estimated CrCl = Estimated Creatinine Clearance: 59.9 mL/min (based on SCr of 0.93 mg/dL).    Creatinine for last 3 days  No results found for requested labs within last 3 days.    Recent Vancomycin Level(s) for last 3 days  No results found for requested labs within last 3 days.      Vancomycin IV Administrations (past 72 hours)        No vancomycin orders with administrations in past 72 hours.                    Nephrotoxins and other renal medications (From now, onward)      Start     Dose/Rate Route Frequency Ordered Stop    24 1430  piperacillin-tazobactam (ZOSYN) 3.375 g vial to attach to  mL bag         3.375 g  over 30 Minutes Intravenous ONCE 24 1407      24 1430  vancomycin (VANCOCIN) 2,000 mg in 0.9% NaCl 500 mL intermittent infusion         2,000 mg  over 2 Hours Intravenous ONCE 24 1414              Contrast Orders - past 72 hours (72h ago, onward)      None               Plan:  Start vancomycin  2000 mg IV once  Vancomycin monitoring method: AUC  Vancomycin therapeutic monitoring goal: 400-600 mg*h/L  Please re-consult pharmacy if intent is to continue while inpatient    Courtney Thao [facility]

## 2024-11-12 NOTE — ED NOTES
Patient refusing to have her wounds cleansed and rewrapped.  Eric Castillo RN  11/12/2024  3:38 PM

## 2024-11-12 NOTE — PROGRESS NOTES
FOOT AND ANKLE SURGERY/PODIATRY CONSULT NOTE        ASSESSMENT:   Cellulitis right foot  Diabetic ulceration right foot into bone  Stage IV pressure ulceration right heel        TREATMENT:  -I discussed with the patient and her daughter today that the ulceration along the dorsal lateral right foot has significant amount of nonviable tissue with purulent drainage, increased depth which probes to bone and erythema extending into the rear foot.  Right heel ulceration has a large eschar with underlying nonviable tissue and increased depth.    -Based on the above, I recommend hospitalization at this time for IV antibiotics and surgical debridement.  We discussed that at minimum she would require surgical debridement of nonviable tissue along the lateral right foot and right heel.  However, I would also recommend an MRI for further evaluation of underlying osteomyelitis.  We reviewed possible need for partial foot amputation or loss of limb pending the MRI report.  All questions invited and answered.  Patient agrees with the treatment plan.    -Wound culture obtained today along the lateral right foot ulceration.      -After discussion of risk factors, nursing staff removed dressing, cleansed wound and consent obtained 2% Lidocaine HCL jelly was applied, under clean conditions, the right foot ulceration(s) were debrided using currette.  Devitalized and nonviable tissue, along with any fibrin and slough, was removed to improve granulation tissue formation, stimulate wound healing, decrease overall bacteria load, disrupt biofilm formation and decrease edge senescence. Wound drainage was moderate Purulent. Total excisional debridement was 10.5 sq cm into the bone with a depth of 1.7 cm.   Ulcers were improved afterwards and .  Measures were as noted on the flow sheet.  Gauze dressing applied today.    -After discussion of risk factors, nursing staff removed dressing, cleansed wound and consent obtained 2% Lidocaine  HCL jelly was applied, under clean conditions, the right heel ulceration(s) were debrided using currette.  Devitalized and nonviable tissue, along with any fibrin and slough, was removed to improve granulation tissue formation, stimulate wound healing, decrease overall bacteria load, disrupt biofilm formation and decrease edge senescence. Wound drainage was small Serosanguinous. Total excisional debridement was 28 sq cm into the muscle/fascia with a depth of 0.4 cm.   Ulcers were improved afterwards and .  Measures were as noted on the flow sheet.  Gauze dressing applied today.    -My office has communicated with the emergency department here at [facility] for the following: Hospital admission for IV antibiotics Zosyn/Vanco, MRI right foot and ankle, n.p.o. at midnight with right foot surgery scheduled for tomorrow.  Consults placed for infectious disease, vascular surgery and podiatry.    -I will plan to follow the patient in house including right foot surgery to be scheduled for tomorrow.    Jere Strong DPM  [facility] [place]      HPI: Linda Loredo was seen today for a right foot infection with ulceration.  Patient's daughter is present for today's visit.  Patient reports that she does not know exactly when the lateral right foot ulceration started but has been seen in the recent past by Dr. Morley for this concern.  Patient has a history of left low the knee amputation.  She reports that she recently spoke to Dr. Otto with vascular surgery and he did not recommend any further intervention to the right lower extremity.  Past medical history significant for type 2 diabetes.    Past Medical History:   Diagnosis Date    Abscess of left foot 10/31/2022    Acute cystitis without hematuria 07/11/2024    Acute kidney injury (H) 07/11/2024    Adverse effect of anticoagulants, initial encounter 04/16/2024    GUSTAVO (acute kidney injury) (H) 05/26/2023    Allergic rhinitis  04/08/2008    Anemia 07/11/2024    Anticoagulation monitoring, INR range 2-3 07/06/2022    Anxiety 10/20/2011    Cardiac murmur, unspecified 05/29/2019    Cellulitis - bilateral lower extremities 05/27/2023    Cellulitis of buttock 05/26/2023    Chronic atrial fibrillation (H) 05/30/2022    New onset during 5/2022 admission      Chronic kidney disease, stage 3b (H) 06/12/2024    Chronic pain 04/26/2010    Chronic right-sided heart failure (H) 11/16/2023    Constipation 04/16/2024    Decubitus ulcers - 5 present on buttocks/perineal region, numerous scabbed over and unstagable on shin and bilateral feet with some bloody blisters noted on feet 05/27/2023    Depressive disorder, not elsewhere classified     Diabetic foot ulcer (H) 09/29/2023    Diabetic polyneuropathy associated with type 2 diabetes mellitus (H) 04/07/2006 February 26, 2007: on gabapentin.  She has been switched from gabapentin to lyrica.       Elevated liver enzymes 10/20/2011    Essential hypertension with goal blood pressure less than 140/90 05/09/2005    Fibromyalgia 10/20/2011    Fracture of fibula, distal, left, closed 10/20/2011    ORIF 10/13/11      Herpes zoster 06/20/2005 February 26, 2007: On gabapentin and lidoderm. She has been switched from gabapentin to lyrica.       Herpes zoster without mention of complication     Hx of osteomyelitis 04/16/2024    S.p left BKA 2023      Hypercalcemia 02/24/2007    Hypoglycemia 05/27/2023    Hypotension 10/27/2011    Insomnia 07/15/2005    Lymphedema, not elsewhere classified 02/09/2024    Major depressive disorder, recurrent episode, moderate (H) 04/08/2008    Metabolic encephalopathy 07/11/2024    Microalbuminuria due to type 2 diabetes mellitus (H) 10/25/2016    Mild intermittent asthma     Mixed hyperlipidemia 12/05/2005    Mixed hyperlipidemia due to type 2 diabetes mellitus (H) 04/08/2008    Formatting of this note is different from the original.     07/20/22 ASCVD 10 year risk: 37.9%       Last Lipids:  Last Lipids:  Chol: 2021 130   63  HDL: 2021 46  Non-HDL: 2021 84  Chol/HDL Ratio: 2021 2.83  LDL DIRECT: . No results found in past 5 years   LDL CHOLESTEROL (mg/dL)   Date Value   2021 71      22   The 10-year ASCVD risk score (Teddy LUIS Jr.    Mixed stress and urge urinary incontinence 2005: On Detrol LA.      Non-healing wound of left heel 2023    Obesity with BMI >35 and comorbidity 2006: Body mass index is 48.07 kg/(m^2).       Opioid dependence, uncomplicated (H) 2023    BERLIN (obstructive sleep apnea) 10/23/2007    Sleep study 2004 reportedly showing severe OBSTRUCTIVE SLEEP APNEA and recommend CPAP, Not available for review. Patient is untreated       Osteoarthritis 2006    Osteomyelitis (H) 10/24/2023    Osteopenia 2015    Other abnormalities of gait and mobility 11/15/2023    Other urinary incontinence     Pressure injury of deep tissue of sacral region 2024    Primary hyperparathyroidism (H) 2013    Work up 2013 Dr. Villareal      Pulmonary hypertension (H) 2024    Pulmonary hypertension by echo and mild to moderate pulmonary hypertension by angiogram       Sepsis without acute organ dysfunction, due to unspecified organism (H) 2023    Sepsis, due to unspecified organism, unspecified whether acute organ dysfunction present (H) 2024    Skin ulcer of right lower leg with fat layer exposed (H) 2024    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) 2024    left lower extremity amputation secondary to osteomyelitis 10/2023, bone biopsy grew MRSA.       Supratherapeutic INR 2023    Type 2 diabetes mellitus with complication, with long-term current use of insulin (H) 2005    diagnosed in        Type II or unspecified type diabetes mellitus with renal manifestations, uncontrolled(250.42) (H)      Type II or unspecified type diabetes mellitus without mention of complication, not stated as uncontrolled     Umbilical hernia without obstruction and without gangrene 12/13/2018    Unspecified essential hypertension     Unspecified open wound, left foot, subsequent encounter 09/22/2023    UTI (urinary tract infection) - possible 05/26/2023    Venous stasis 04/16/2024    Vitamin D deficiency 09/08/2022    Warfarin-induced coagulopathy (H) 05/27/2023       Past Surgical History:   Procedure Laterality Date    AMPUTATE LEG BELOW KNEE Left 10/7/2023    Procedure: LEFT GUILLOTINE BELOW KNEE AMPUTATION.;  Surgeon: Lan Otto MD;  Location:  OR    AMPUTATE LEG BELOW KNEE Left 10/14/2023    Procedure: debridement of left below the knee amputation;  Surgeon: Lan Otto MD;  Location:  OR    APPLY WOUND VAC Left 1/2/2024    Procedure: WOUND VAC APPLICATION TO LEFT BELOW KNEE STUMP;  Surgeon: Lan Otto MD;  Location:  OR    CHOLECYSTECTOMY      GRAFT SKIN SPLIT THICKNESS FROM TRUNK TO HEAD Left 1/2/2024    Procedure: APPLICATION OF SPLIT-THICKNESS SKIN GRAFT TO LEFT BELOW KNEE STUMP, GRAFT FROM LEFT THIGH;  Surgeon: Lan Otto MD;  Location:  OR    INCISION AND DRAINAGE FOOT, COMBINED Left 9/26/2023    Procedure: Surgical debridement of all nonviable skin and soft tissue and bone left foot;  Surgeon: Nolberto Thorne DPM;  Location: [facility] OR    IR LOWER EXTREMITY ANGIOGRAM LEFT  10/3/2023    IRRIGATION AND DEBRIDEMENT LOWER EXTREMITY, COMBINED Right 7/14/2024    Procedure: IRRIGATION AND DEBRIDEMENT, LOWER EXTREMITY, RIGHT LOWER LEG;  Surgeon: Phuong Gutierrez MD;  Location: [facility] OR    ligation of fallopian tube      OPEN REDUCTION INTERNAL FIXATION ANKLE  10/13/11    left    pinning of left 2nd toe          Allergies   Allergen Reactions    Prednisone Nausea and Vomiting    Morphine Nausea and Vomiting    Morphine [Fumaric Acid] Nausea and Vomiting    Nitrofurantoin  Unknown     Per nursing home         Current Outpatient Medications:     acetaminophen (TYLENOL) 500 MG tablet, Take 2 tablets (1,000 mg) by mouth every 8 hours as needed for mild pain Pain 1-5/10, Disp: , Rfl:     atorvastatin (LIPITOR) 40 MG tablet, Take 1 tablet (40 mg) by mouth every evening, Disp: , Rfl:     Benzocaine (HURRICAINE/TOPEX) 20 % AERO spray, daily TOPICAL spray 20% daily with wound care, Disp: , Rfl:     Insulin Aspart FlexPen 100 UNIT/ML SOPN, , Disp: , Rfl:     insulin degludec (TRESIBA) 200 UNIT/ML pen, Inject 34 Units subcutaneously every morning Hold for BG < 100, Disp: , Rfl:     melatonin 1 MG TABS tablet, Take 1 tablet (1 mg) by mouth nightly as needed for sleep, Disp: , Rfl:     methadone (DOLOPHINE) 5 MG tablet, Take 1 tablet (5 mg) by mouth 3 times daily as needed for severe pain, Disp: 30 tablet, Rfl: 0    methadone (DOLOPHINE) 5 MG tablet, Take 1 tablet (5 mg) by mouth 4 times daily, Disp: 120 tablet, Rfl: 0    metolazone (ZAROXOLYN) 2.5 MG tablet, TAKE 1 TAB BY MOUTH ON THURSDAY FOR LYMPHEDEMA, Disp: , Rfl:     miconazole (MICATIN) 2 % external powder, Apply topically 2 times daily, Disp: , Rfl:     mineral oil-hydrophilic petrolatum (AQUAPHOR) external ointment, Apply to left thigh wound BID, Disp: , Rfl:     Multiple Vitamin (TAB-A-YUN) TABS, Take 1 tablet by mouth daily (with lunch), Disp: , Rfl:     ondansetron (ZOFRAN ODT) 4 MG ODT tab, Take 1 tablet (4 mg) by mouth every 6 hours as needed for nausea or vomiting, Disp: , Rfl:     polyethylene glycol (MIRALAX) 17 GM/Dose powder, Take 17 g by mouth 2 times daily as needed for constipation, Disp: , Rfl:     potassium chloride ER (K-TAB) 20 MEQ CR tablet, Take 20 mEq by mouth 2 times daily, Disp: , Rfl:     senna-docusate (SENOKOT-S/PERICOLACE) 8.6-50 MG tablet, Take 1 tablet by mouth 2 times daily as needed for constipation, Disp: , Rfl:     torsemide (DEMADEX) 20 MG tablet, Take 60 mg by mouth daily, Disp: , Rfl:     WARFARIN  SODIUM PO, Take by mouth daily Dosing in coordination with INR results Full warfarin instructions: 7/26/24: Hold; Otherwise 1.5 mg every Mon, Wed, Fri; 3 mg all other days Next INR check: 7/30/2024, Disp: , Rfl:     wound support modular (EXPEDITE) LIQD bottle, Take 60 mLs by mouth daily, Disp: , Rfl:     Zinc oxide 10 % CREA, Externally apply topically 3 times daily Apply to coccyx area once per shift after brief changes, Disp: , Rfl:     acetic acid 1 % SOLN, Apply topically daily With RLE wound cares (Patient not taking: Reported on 11/12/2024), Disp: , Rfl:     arginine (ARGINAID) PACK, Take 1 packet by mouth daily In OJ (Patient not taking: Reported on 11/12/2024), Disp: , Rfl:     Menthol, Topical Analgesic, (BIOFREEZE EX), Apply 1 Application topically 3 times daily as needed Apply to neck and shoulders (Patient not taking: Reported on 10/31/2024), Disp: , Rfl:     Current Facility-Administered Medications:     lidocaine (XYLOCAINE) 5 % ointment, , Topical, Continuous PRN, Luis E Morley MD, New Bag at 09/30/24 0802    Social History     Social History Narrative    Not on file       Family History   Problem Relation Age of Onset    Hypertension Mother     Heart Disease Father         heart attack and cardiomegaly    Diabetes Sister         type 2    Hypertension Sister     Respiratory Sister     Psychotic Disorder Sister         bipolar    Cancer Maternal Grandmother         throat    Cancer Paternal Grandmother         gastric       Review of Systems - 10 point Review of Systems is negative except for right foot infection which is noted in HPI.      OBJECTIVE:  Appearance: alert, well appearing, and in no distress.    /63   Pulse 110   Temp 98.3  F (36.8  C)   Resp 18   SpO2 97%     BMI= There is no height or weight on file to calculate BMI.    General appearance: Patient is alert and fully cooperative with history & exam.  No sign of distress is noted during the visit.     Psychiatric:  Affect is pleasant & appropriate.  Patient appears motivated to improve health.     Respiratory: Breathing is regular & unlabored while sitting.     HEENT: Hearing is intact to spoken word.  Speech is clear.  No gross evidence of visual impairment that would impact ambulation.    Vascular: Dorsalis pedis non-palpableRight.  Dermatologic:   Wound Buttocks Pressure injury suspected hospital acquired (Active)       Wound Buttocks Pressure injury suspected hospital acquired (Active)       Wound Thigh Pressure injury suspected hospital acquired (Active)       Wound Thigh Pressure injury suspected hospital acquired (Active)       Wound Leg (Active)       Wound Leg (Active)       Wound Buttocks Pressure injury community acquired (Active)       Wound Elbow Pressure injury community acquired Stage 2 (Active)       Wound Leg Ulceration (Active)       Wound Heel Pressure injury community acquired Deep tissue pressure injury (Active)       Wound Sacrum Other (comment) (Active)       Wound Leg Other (comment);Ulceration;Moisture damage (Active)       Wound Buttocks Pressure injury community acquired (Active)       Wound Leg (Active)       Wound Thigh Pressure injury community acquired (Active)       VASC Wound left BKA (Active)   Description blister 02/26/24 1200       VASC Wound Right leg (Active)   Pre Size Length 24 10/31/24 0905   Pre Size Width 57 10/31/24 0905   Pre Size Depth 0.3 10/31/24 0905   Pre Total Sq cm 1368 10/31/24 0905   Description scattered 10/31/24 0905       VASC Wound Right heel (Active)   Pre Size Length 4 11/12/24 1242   Pre Size Width 7 11/12/24 1242   Pre Size Depth 0.2 11/12/24 1242   Pre Total Sq cm 28 11/12/24 1242       VASC Wound Right lateral foot (Active)   Pre Size Length 3 11/12/24 1242   Pre Size Width 3.5 11/12/24 1242   Pre Size Depth 1.5 11/12/24 1242   Pre Total Sq cm 10.5 11/12/24 1242       VASC Wound right lateral distal foot (Active)   Pre Size Length 2 11/12/24 1242   Pre Size Width  3 11/12/24 1242   Pre Size Depth 1.7 11/12/24 1242   Pre Total Sq cm 6 11/12/24 1242       Incision/Surgical Site 01/02/24 Anterior;Left Thigh (Active)       Incision/Surgical Site 01/02/24 Left (Active)   Ulceration along the dorsal lateral right foot has significant amount of nonviable tissue, purulent drainage with increased depth and probes to bone and erythema extending from the right forefoot into the midfoot/rear foot.  Ulceration along the posterior aspect of the right heel has a large eschar with nonviable tissue and increased depth.  Neurologic: Diminished to light touch Right.  Musculoskeletal: Left BKA.

## 2024-11-12 NOTE — ED PROVIDER NOTES
EMERGENCY DEPARTMENT ENCOUNTER      NAME: Linda Loredo  AGE: 79 year old female  YOB: 1945  MRN: [id number]  EVALUATION DATE & TIME: No admission date for patient encounter.    PCP: Louann Peraza Physician    ED PROVIDER: Girish Young M.D.      Chief Complaint   Patient presents with    Leg Pain    Foot Pain         FINAL IMPRESSION:  1. Cellulitis of right lower extremity          ED COURSE & MEDICAL DECISION MAKING:    Pertinent Labs & Imaging studies reviewed below.  All EKGs below represent my independent interpretation.   ED Course as of 11/12/24 1802 Tue Nov 12, 2024   1421 Patient is a 79-year-old woman with a history of diabetes, peripheral neuropathy, osteomyelitis who presents with cellulitis and infection of a chronic wound on her left leg calling about lower extremity/stump.  She was seen by her podiatry/vascular surgery earlier today, exam is consistent with cellulitis, and there is concern for acute osteomyelitis.  Recommendations were for broad-spectrum antibiotics, MRI, admission for or debridement tomorrow.  I met the patient out in the waiting room, due to bed boarding crisis, we have no beds currently in the department.  She has normal blood pressure, temperature, heart rate mildly elevated to 103.  Lab work ordered, x-ray ordered.  Lab work also includes lactate and blood culture.  Reviewed chart and pictures from clinic visit earlier today.   Exam the patient later on when she was in the room.  Her right lower leg is warm, there are multiple areas of skin breakdown, some pustules.  Certainly concerning for cellulitis.  MRI of the right foot and ankle was ordered.  Broad-spectrum antibiotics were started after blood cultures obtained.  Admitted to the hospitalist.  Dr. Strong mentions that plans are currently to bring patient to the OR sometime tomorrow morning.    Additional ED Course timestamps entered by medical scribe:  2:18 PM I met the patient and  performed my initial interview and exam. Discussed workup in the emergency department, management of symptoms, and likely disposition.    2:47 PM I spoke with Dr. Strong, podiatry.  4:14 PM I spoke with Dr. Higginbotham, hospitalist who accepts the patient for admission.    Medical Decision Making  Obtained supplemental history:Supplemental history obtained?: Family Member/Significant Other  Reviewed external records: External records reviewed?: Outpatient Record: [facility] visit on 11/12/2024  Care impacted by chronic illness:Chronic Pain and Diabetes  Care significantly affected by social determinants of health:N/A  Did you consider but not order tests?: Work up considered but not performed and documented in chart, if applicable  Did you interpret images independently?: Independent interpretation of ECG and images noted in documentation, when applicable.  Consultation discussion with other provider: Phone conversation with consultants will be documented in the ED Course  Admit.    MIPS Criteria Documentation: Not Applicable      MEDICATIONS GIVEN IN THE EMERGENCY:  Medications   lidocaine 1 % 0.1-1 mL (has no administration in time range)   lidocaine (LMX4) cream (has no administration in time range)   sodium chloride (PF) 0.9% PF flush 3 mL (has no administration in time range)   sodium chloride (PF) 0.9% PF flush 3 mL (has no administration in time range)   senna-docusate (SENOKOT-S/PERICOLACE) 8.6-50 MG per tablet 1 tablet (has no administration in time range)     Or   senna-docusate (SENOKOT-S/PERICOLACE) 8.6-50 MG per tablet 2 tablet (has no administration in time range)   calcium carbonate (TUMS) chewable tablet 1,000 mg (has no administration in time range)   Patient is already receiving anticoagulation with heparin, enoxaparin (LOVENOX), warfarin (COUMADIN)  or other anticoagulant medication (has no administration in time range)   acetaminophen (TYLENOL) tablet 650 mg (has no  administration in time range)     Or   acetaminophen (TYLENOL) Suppository 650 mg (has no administration in time range)   HYDROmorphone (PF) (DILAUDID) injection 0.3 mg (has no administration in time range)   HYDROmorphone (PF) (DILAUDID) injection 0.5 mg (has no administration in time range)   polyethylene glycol (MIRALAX) Packet 17 g (has no administration in time range)   ondansetron (ZOFRAN ODT) ODT tab 4 mg (has no administration in time range)     Or   ondansetron (ZOFRAN) injection 4 mg (has no administration in time range)   prochlorperazine (COMPAZINE) injection 5 mg (has no administration in time range)     Or   prochlorperazine (COMPAZINE) tablet 5 mg (has no administration in time range)     Or   prochlorperazine (COMPAZINE) suppository 12.5 mg (has no administration in time range)   piperacillin-tazobactam (ZOSYN) 3.375 g vial to attach to  mL bag (has no administration in time range)   piperacillin-tazobactam (ZOSYN) 3.375 g vial to attach to  mL bag (0 g Intravenous Stopped 11/12/24 1701)   vancomycin (VANCOCIN) 2,000 mg in 0.9% NaCl 500 mL intermittent infusion (2,000 mg Intravenous $New Bag 11/12/24 1543)         NEW PRESCRIPTIONS STARTED AT TODAY'S ER VISIT  New Prescriptions    No medications on file          =================================================================    HPI    Linda Loredo is a 79 year old female with a history of a left below the knee amputation, peripheral neuropathy, osteomyelitis, T2DM, and chronic pain who presents to this ED for evaluation of a right foot ulceration.    Per chart review, the patient was seen in the [facility] earlier today (11/12/2024) for a right foot infection with ulceration. Patient's daughter was also present at the visit. Patient reports that she does not know exactly when the lateral right foot ulceration started but has been seen in the recent past by Dr. Morley for this concern. She  reports that she recently spoke to Dr. Otto with vascular surgery and he did not recommend any further intervention to the right lower extremity. Dr. Strong discussed with the patient and daughter that the ulceration along the dorsal lateral right foot has significant amount of nonviable tissue with purulent drainage, increased depth which probes to bone and erythema extending into the rear foot. Patient was sent to the ED, recommending hospitalization for IV antibiotics, surgical debridement and MRI for further evaluation of underlying osteomyelitis. Wound culture of the lateral right foot ulceration was also obtained.    VITALS:  /66   Pulse 103   Temp 97.9  F (36.6  C) (Oral)   Resp 18   Wt 104.3 kg (230 lb)   SpO2 98%   BMI 37.12 kg/m      PHYSICAL EXAM    Constitutional: Comfortable appearing.  HENT: Atraumatic, mucous membranes moist, nose normal. Neck- Supple, gross ROM intact.   Eyes: Pupils mid-range, conjunctiva without injection, no discharge.   Respiratory: Clear to auscultation bilaterally, no respiratory distress, no wheezing, speaks full sentences easily. No cough.  Cardiovascular: mildly tachycardic heart rate, regular rhythm, no murmurs.   GI: Soft, no tenderness to deep palpation in all quadrants, no masses.  Musculoskeletal: Left lower leg BKA.  Right lower leg and foot wrapped.  Skin: Right lower leg wrapped in gauze, with malodorous, yellow discharge of the dressings.  Erythema and warmth spread proximally up towards the knee.  Decreased sensation in this area, but it is painful.  Neurologic: Alert & oriented x 3, cranial nerves grossly intact.  Psychiatric: Affect normal, cooperative.      I, Jeancarlos Hyde am serving as a scribe to document services personally performed by Dr. Girish Young based on my observation and the provider's statements to me. Girish CARDENAS MD attest that Jeancarlos Hyde is acting in a scribe capacity, has observed my performance of the services and  has documented them in accordance with my direction.    Girish Young M.D.  Emergency Medicine  [facility] EMERGENCY ROOM  [address] [id number] [phone]  Dept: [phone]       Girish Young MD  11/12/24 3983

## 2024-11-12 NOTE — ED TRIAGE NOTES
Pt sent from Dr. Strong's office for R foot ulcer for likely IV antibiotics and admission. Pt also with ulcers to R leg that are improved. Living at TCU currently. Pt alert. No known fevers.     Triage Assessment (Adult)       Row Name 11/12/24 1346          Triage Assessment    Airway WDL WDL        Respiratory WDL    Respiratory WDL WDL        Skin Circulation/Temperature WDL    Skin Circulation/Temperature WDL X  multiple ulcers to R leg and foot        Cardiac WDL    Cardiac WDL WDL        Peripheral/Neurovascular WDL    Peripheral Neurovascular WDL WDL  L below knee amputation     Capillary Refill, General less than/equal to 3 secs        Cognitive/Neuro/Behavioral WDL    Cognitive/Neuro/Behavioral WDL WDL        [place] Coma Scale    Best Eye Response 4-->(E4) spontaneous     Best Motor Response 6-->(M6) obeys commands     Best Verbal Response 5-->(V5) oriented     [place] Coma Scale Score 15

## 2024-11-13 ENCOUNTER — APPOINTMENT (OUTPATIENT)
Dept: CARDIOLOGY | Facility: CLINIC | Age: 79
DRG: 616 | End: 2024-11-13
Payer: MEDICARE

## 2024-11-13 ENCOUNTER — TELEPHONE (OUTPATIENT)
Dept: VASCULAR SURGERY | Facility: CLINIC | Age: 79
End: 2024-11-13

## 2024-11-13 LAB
ANION GAP SERPL CALCULATED.3IONS-SCNC: 8 MMOL/L (ref 7–15)
BUN SERPL-MCNC: 23.3 MG/DL (ref 8–23)
CALCIUM SERPL-MCNC: 9.7 MG/DL (ref 8.8–10.4)
CHLORIDE SERPL-SCNC: 98 MMOL/L (ref 98–107)
CREAT SERPL-MCNC: 0.89 MG/DL (ref 0.51–0.95)
EGFRCR SERPLBLD CKD-EPI 2021: 66 ML/MIN/1.73M2
ERYTHROCYTE [DISTWIDTH] IN BLOOD BY AUTOMATED COUNT: 16.3 % (ref 10–15)
GLUCOSE BLDC GLUCOMTR-MCNC: 115 MG/DL (ref 70–99)
GLUCOSE BLDC GLUCOMTR-MCNC: 145 MG/DL (ref 70–99)
GLUCOSE BLDC GLUCOMTR-MCNC: 195 MG/DL (ref 70–99)
GLUCOSE BLDC GLUCOMTR-MCNC: 196 MG/DL (ref 70–99)
GLUCOSE BLDC GLUCOMTR-MCNC: 90 MG/DL (ref 70–99)
GLUCOSE SERPL-MCNC: 146 MG/DL (ref 70–99)
HCO3 SERPL-SCNC: 33 MMOL/L (ref 22–29)
HCT VFR BLD AUTO: 28.6 % (ref 35–47)
HGB BLD-MCNC: 8.8 G/DL (ref 11.7–15.7)
INR PPP: 1.73 (ref 0.85–1.15)
MCH RBC QN AUTO: 27.5 PG (ref 26.5–33)
MCHC RBC AUTO-ENTMCNC: 30.8 G/DL (ref 31.5–36.5)
MCV RBC AUTO: 89 FL (ref 78–100)
PLATELET # BLD AUTO: 279 10E3/UL (ref 150–450)
POTASSIUM SERPL-SCNC: 4 MMOL/L (ref 3.4–5.3)
RBC # BLD AUTO: 3.2 10E6/UL (ref 3.8–5.2)
SODIUM SERPL-SCNC: 139 MMOL/L (ref 135–145)
TROPONIN T SERPL HS-MCNC: 35 NG/L
WBC # BLD AUTO: 7.5 10E3/UL (ref 4–11)

## 2024-11-13 PROCEDURE — 99233 SBSQ HOSP IP/OBS HIGH 50: CPT | Mod: GC | Performed by: DIETITIAN, REGISTERED

## 2024-11-13 PROCEDURE — 250N000012 HC RX MED GY IP 250 OP 636 PS 637

## 2024-11-13 PROCEDURE — 120N000001 HC R&B MED SURG/OB

## 2024-11-13 PROCEDURE — 999N000208 ECHOCARDIOGRAM COMPLETE

## 2024-11-13 PROCEDURE — 250N000013 HC RX MED GY IP 250 OP 250 PS 637

## 2024-11-13 PROCEDURE — 99221 1ST HOSP IP/OBS SF/LOW 40: CPT | Performed by: PHYSICIAN ASSISTANT

## 2024-11-13 PROCEDURE — 84484 ASSAY OF TROPONIN QUANT: CPT

## 2024-11-13 PROCEDURE — 85610 PROTHROMBIN TIME: CPT

## 2024-11-13 PROCEDURE — 93306 TTE W/DOPPLER COMPLETE: CPT | Mod: 26 | Performed by: INTERNAL MEDICINE

## 2024-11-13 PROCEDURE — 250N000011 HC RX IP 250 OP 636

## 2024-11-13 PROCEDURE — 36415 COLL VENOUS BLD VENIPUNCTURE: CPT

## 2024-11-13 PROCEDURE — 85014 HEMATOCRIT: CPT

## 2024-11-13 PROCEDURE — 80048 BASIC METABOLIC PNL TOTAL CA: CPT

## 2024-11-13 PROCEDURE — 99222 1ST HOSP IP/OBS MODERATE 55: CPT | Performed by: INTERNAL MEDICINE

## 2024-11-13 PROCEDURE — 258N000003 HC RX IP 258 OP 636

## 2024-11-13 PROCEDURE — 255N000002 HC RX 255 OP 636

## 2024-11-13 PROCEDURE — 85041 AUTOMATED RBC COUNT: CPT

## 2024-11-13 PROCEDURE — 82962 GLUCOSE BLOOD TEST: CPT

## 2024-11-13 RX ADMIN — PIPERACILLIN AND TAZOBACTAM 3.38 G: 3; .375 INJECTION, POWDER, FOR SOLUTION INTRAVENOUS at 00:59

## 2024-11-13 RX ADMIN — PERFLUTREN 2 ML: 6.52 INJECTION, SUSPENSION INTRAVENOUS at 10:56

## 2024-11-13 RX ADMIN — VANCOMYCIN HYDROCHLORIDE 1750 MG: 5 INJECTION, POWDER, LYOPHILIZED, FOR SOLUTION INTRAVENOUS at 17:56

## 2024-11-13 RX ADMIN — PIPERACILLIN AND TAZOBACTAM 3.38 G: 3; .375 INJECTION, POWDER, FOR SOLUTION INTRAVENOUS at 09:43

## 2024-11-13 RX ADMIN — METHADONE HYDROCHLORIDE 5 MG: 5 TABLET ORAL at 16:40

## 2024-11-13 RX ADMIN — METHADONE HYDROCHLORIDE 5 MG: 5 TABLET ORAL at 16:30

## 2024-11-13 RX ADMIN — METHADONE HYDROCHLORIDE 5 MG: 5 TABLET ORAL at 09:36

## 2024-11-13 RX ADMIN — MICONAZOLE NITRATE: 2 POWDER TOPICAL at 21:58

## 2024-11-13 RX ADMIN — HYDROMORPHONE HYDROCHLORIDE 0.5 MG: 1 INJECTION, SOLUTION INTRAMUSCULAR; INTRAVENOUS; SUBCUTANEOUS at 03:22

## 2024-11-13 RX ADMIN — INSULIN ASPART 1 UNITS: 100 INJECTION, SOLUTION INTRAVENOUS; SUBCUTANEOUS at 01:12

## 2024-11-13 RX ADMIN — INSULIN ASPART 2 UNITS: 100 INJECTION, SOLUTION INTRAVENOUS; SUBCUTANEOUS at 16:31

## 2024-11-13 RX ADMIN — PIPERACILLIN AND TAZOBACTAM 3.38 G: 3; .375 INJECTION, POWDER, FOR SOLUTION INTRAVENOUS at 16:31

## 2024-11-13 RX ADMIN — POTASSIUM CHLORIDE 20 MEQ: 1500 TABLET, EXTENDED RELEASE ORAL at 09:37

## 2024-11-13 RX ADMIN — TORSEMIDE 60 MG: 20 TABLET ORAL at 09:37

## 2024-11-13 RX ADMIN — INSULIN ASPART 2 UNITS: 100 INJECTION, SOLUTION INTRAVENOUS; SUBCUTANEOUS at 21:02

## 2024-11-13 RX ADMIN — POTASSIUM CHLORIDE 20 MEQ: 1500 TABLET, EXTENDED RELEASE ORAL at 22:19

## 2024-11-13 RX ADMIN — METHADONE HYDROCHLORIDE 5 MG: 5 TABLET ORAL at 00:54

## 2024-11-13 RX ADMIN — METHADONE HYDROCHLORIDE 5 MG: 5 TABLET ORAL at 21:55

## 2024-11-13 NOTE — PROGRESS NOTES
Addendum 11/13/2024 0219- repeat troponin was successfully collected and decreased from 38 to 35.  Stable electrolytes. Repeat hemoglobin 8.8 which appears to be near her baseline.    Brief Progress Note  -I received a page from bedside RN Alyssa Barber at [id number] on 11/12/2024. Patient noted to be refusing repeat troponin check. She is currently completing MRI of Right foot. She was given 0.5 mg IV dilaudid prior to procedure. I will continue to encourage completion of repeat troponin on a timely basis. I informed patient at our meeting earlier in the evening that additional labwork was needed to ensure medical optimization for impending right foot surgery with Dr. Keira Strong and she was at the time agreeable to this. She has additional labwork ordered for 12/13/2024 at 0600, but I do recommend repeat troponin be obtained before this.   -monitor closely for signs of cardiac ischemia.    Flash Blunt, DO

## 2024-11-13 NOTE — PROVIDER NOTIFICATION
Pt refused troponin q2 hr check. Educated. Pt mood labile and presenting with slightly aggressive behavior towards staff. Verbally aggressive & throwing items around room. Pt in severe pain prior to MRI, offered scheduled methadone but pt stated it would not work fast enough. Gave 0.5mg dilaudid. Pt currently at MRI & seems to be tolerating. MD paged regarding updates. Approved medication re timing as pt was not available (in imaging procedures) & refusing.

## 2024-11-13 NOTE — CONSULTS
VASCULAR SURGERY INPATIENT CONSULTATION / Initial In-Patient visit    VASCULAR SURGEON: Tierney Sanches MD    LOCATION: [facility]    Linda Loredo  Medical Record #:  [id number]  YOB: 1945  Age:  79 year old     Date of Service: 11/12/2024    PRIMARY CARE PROVIDER: Louann Peraza Physician    Reason for consultation: Uncontrolled DM w/ hx L BKA, now admitted with R diabetic foot ulcer/cellulitis/?osteo and plan for OR debridement, consult requested by podiatry    IMPRESSION: 79-year-old female with uncontrolled diabetes mellitus who was sent to the ED from podiatry clinic due to a large right heel ulcer and lateral foot wound with concern for active infection.  She was worked up with MRI demonstrating acute osteomyelitis of the calcaneus, metatarsals 3, 4, 5, lateral cuneiform. Given the extend of infection, salvage of the right foot would not a viable option. Podiatry recommending below knee amputation at this time.     Of note, patient was also worked up with arterial tudies demonstrating monophasic flow throughout the right lower extremity.  Unable to obtain an ALMA due to wounds with toe pressures of only 26 mmHg on the right.    RECOMMENDATION: Continue IV antibiotics with vancomycin and Zosyn, appreciate ID input.  Warfarin on hold for upcoming surgery. Recommendations for BKA discussed with patient and her daughter over the phone. Will plan for a below-knee amputation in a staged approach with guillotine amputation scheduled for tomorrow, 11/14.  NPO at midnight.    HPI:  Linda Loredo is a 79 year old female who was seen today in consultation for hypertension, atrial fibrillation on anticoagulation with Coumadin, congestive heart failure, fibromyalgia, anemia of chronic disease, lymphedema, and type 2 diabetes mellitus.  Patient has a history of a left below-knee amputation done last year due to a left heel wound with osteomyelitis.  She was seen  by Dr. Strong in the podiatry clinic yesterday for evaluation of the right lateral foot wound and right heel wound.  From the podiatry clinic she was recommended to go to the ED for admission, IV antibiotics, and further workup with MRI.  MRI demonstrated acute osteomyelitis of the calcaneus, metatarsals, and lateral cuneiform.  Given the extent of infection, BKA was recommended and vascular surgery was consulted.    Today, Mrs. Loredo was seen and evaluated bedside.  She notes having wounds to the right foot for over a year. She has [facility] residing at a [facility] where she has assistance with wound care. Patient has been experiencing pain in the right leg and foot due to her wounds. She is nonambulatory given her history of a left below-knee amputation.  Denies any fevers, chills, or other systemic signs of infection.  She has been started on IV antibiotics with blood cultures pending.  Warfarin has been put on hold with an INR this morning of 1.7 in preparation for surgery.    All questions answered. NO other concerns.    [facility]:    Past Medical History:   Diagnosis Date    Abscess of left foot 10/31/2022    Acute cystitis without hematuria 07/11/2024    Acute kidney injury (H) 07/11/2024    Adverse effect of anticoagulants, initial encounter 04/16/2024    GUSTAVO (acute kidney injury) (H) 05/26/2023    Allergic rhinitis 04/08/2008    Anemia 07/11/2024    Anticoagulation monitoring, INR range 2-3 07/06/2022    Anxiety 10/20/2011    Cardiac murmur, unspecified 05/29/2019    Cellulitis - bilateral lower extremities 05/27/2023    Cellulitis of buttock 05/26/2023    Chronic atrial fibrillation (H) 05/30/2022    New onset during 5/2022 admission      Chronic kidney disease, stage 3b (H) 06/12/2024    Chronic pain 04/26/2010    Chronic right-sided heart failure (H) 11/16/2023    Constipation 04/16/2024    Decubitus ulcers - 5 present on buttocks/perineal region, numerous scabbed over and unstagable on shin and bilateral feet with  some bloody blisters noted on feet 2023    Depressive disorder, not elsewhere classified     Diabetic foot ulcer (H) 2023    Diabetic polyneuropathy associated with type 2 diabetes mellitus (H) 2006: on gabapentin.  She has been switched from gabapentin to lyrica.       Elevated liver enzymes 10/20/2011    Essential hypertension with goal blood pressure less than 140/90 2005    Fibromyalgia 10/20/2011    Fracture of fibula, distal, left, closed 10/20/2011    ORIF 10/13/11      Herpes zoster 2005: On gabapentin and lidoderm. She has been switched from gabapentin to lyrica.       Herpes zoster without mention of complication     Hx of osteomyelitis 2024    S.p left BKA       Hypercalcemia 2007    Hypoglycemia 2023    Hypotension 10/27/2011    Insomnia 07/15/2005    Lymphedema, not elsewhere classified 2024    Major depressive disorder, recurrent episode, moderate (H) 2008    Metabolic encephalopathy 2024    Microalbuminuria due to type 2 diabetes mellitus (H) 10/25/2016    Mild intermittent asthma     Mixed hyperlipidemia 2005    Mixed hyperlipidemia due to type 2 diabetes mellitus (H) 2008    Formatting of this note is different from the original.     22 ASCVD 10 year risk: 37.9%      Last Lipids:  Last Lipids:  Chol: 2021 130   63  HDL: 2021 46  Non-HDL: 2021 84  Chol/HDL Ratio: 2021 2.83  LDL DIRECT: . No results found in past 5 years   LDL CHOLESTEROL (mg/dL)   Date Value   2021 71      22   The 10-year ASCVD risk score ([employer] SMITH Jr.    Mixed stress and urge urinary incontinence 2005: On Detrol LA.      Non-healing wound of left heel 2023    Obesity with BMI >35 and comorbidity 2006: Body mass index is 48.07 kg/(m^2).       Opioid dependence, uncomplicated (H) 2023    BERLIN  (obstructive sleep apnea) 10/23/2007    Sleep study 2004 reportedly showing severe OBSTRUCTIVE SLEEP APNEA and recommend CPAP, Not available for review. Patient is untreated       Osteoarthritis 09/18/2006    Osteomyelitis (H) 10/24/2023    Osteopenia 05/20/2015    Other abnormalities of gait and mobility 11/15/2023    Other urinary incontinence     Pressure injury of deep tissue of sacral region 07/11/2024    Primary hyperparathyroidism (H) 01/23/2013    Work up January 2013 Dr. 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       Supratherapeutic INR 05/27/2023    Type 2 diabetes mellitus with complication, with long-term current use of insulin (H) 04/18/2005    diagnosed in 2001       Type II or unspecified type diabetes mellitus with renal manifestations, uncontrolled(250.42) (H)     Type II or unspecified type diabetes mellitus without mention of complication, not stated as uncontrolled     Umbilical hernia without obstruction and without gangrene 12/13/2018    Unspecified essential hypertension     Unspecified open wound, left foot, subsequent encounter 09/22/2023    UTI (urinary tract infection) - possible 05/26/2023    Venous stasis 04/16/2024    Vitamin D deficiency 09/08/2022    Warfarin-induced coagulopathy (H) 05/27/2023       Past Surgical History:   Procedure Laterality Date    AMPUTATE LEG BELOW KNEE Left 10/7/2023    Procedure: LEFT GUILLOTINE BELOW KNEE AMPUTATION.;   Surgeon: Lan Otto MD;  Location: [facility] OR    AMPUTATE LEG BELOW KNEE Left 10/14/2023    Procedure: debridement of left below the knee amputation;  Surgeon: Lan Otto MD;  Location: [facility] OR    APPLY WOUND VAC Left 1/2/2024    Procedure: WOUND VAC APPLICATION TO LEFT BELOW KNEE STUMP;  Surgeon: Lan Otto MD;  Location: [facility] OR    CHOLECYSTECTOMY      GRAFT SKIN SPLIT THICKNESS FROM TRUNK TO HEAD Left 1/2/2024    Procedure: APPLICATION OF SPLIT-THICKNESS SKIN GRAFT TO LEFT BELOW KNEE STUMP, GRAFT FROM LEFT THIGH;  Surgeon: Lan Otto MD;  Location: [facility] OR    INCISION AND DRAINAGE FOOT, COMBINED Left 9/26/2023    Procedure: Surgical debridement of all nonviable skin and soft tissue and bone left foot;  Surgeon: Nolberto Thorne DPM;  Location: [facility] OR    IR LOWER EXTREMITY ANGIOGRAM LEFT  10/3/2023    IRRIGATION AND DEBRIDEMENT LOWER EXTREMITY, COMBINED Right 7/14/2024    Procedure: IRRIGATION AND DEBRIDEMENT, LOWER EXTREMITY, RIGHT LOWER LEG;  Surgeon: Phuong Gutierrez MD;  Location: [facility] OR    ligation of fallopian tube      OPEN REDUCTION INTERNAL FIXATION ANKLE  10/13/11    left    pinning of left 2nd toe        ALLERGIES:     Allergies   Allergen Reactions    Prednisone Nausea and Vomiting    Morphine Nausea and Vomiting    Morphine [Fumaric Acid] Nausea and Vomiting    Nitrofurantoin Unknown     Per nursing home      MEDS:    Current Facility-Administered Medications:     acetaminophen (TYLENOL) tablet 650 mg, 650 mg, Oral, Q4H PRN **OR** acetaminophen (TYLENOL) Suppository 650 mg, 650 mg, Rectal, Q4H PRN, Milo Sawyer DO    [Held by provider] atorvastatin (LIPITOR) tablet 40 mg, 40 mg, Oral, QPM, Flash Blunt DO    calcium carbonate (TUMS) chewable tablet 1,000 mg, 1,000 mg, Oral, 4x Daily PRN, Milo Sawyer DO    glucose gel 15-30 g, 15-30 g, Oral, Q15 Min PRN **OR** dextrose 50 % injection 25-50 mL, 25-50 mL, Intravenous, Q15 Min PRN **OR** glucagon injection 1  mg, 1 mg, Subcutaneous, Q15 Min PRN, Flash Blunt DO    HYDROmorphone (PF) (DILAUDID) injection 0.3 mg, 0.3 mg, Intravenous, Q2H PRN, Milo Sawyer DO    HYDROmorphone (PF) (DILAUDID) injection 0.5 mg, 0.5 mg, Intravenous, Q2H PRN, Milo Sawyer DO, 0.5 mg at 11/13/24 0322    insulin aspart (NovoLOG) injection (RAPID ACTING), 1-7 Units, Subcutaneous, Q4H, Flash Blunt DO, 1 Units at 11/13/24 0112    lidocaine (LMX4) cream, , Topical, Q1H PRN, Milo Sawyer DO    lidocaine (XYLOCAINE) 5 % ointment, , Topical, Continuous PRN, Luis E Morley MD, New Bag at 09/30/24 0802    lidocaine 1 % 0.1-1 mL, 0.1-1 mL, Other, Q1H PRN, Milo Sawyer DO    methadone (DOLOPHINE) tablet 5 mg, 5 mg, Oral, 4x Daily, Flash Blunt DO, 5 mg at 11/13/24 0054    miconazole (MICATIN) 2 % powder, , Topical, BID, Flash Blunt DO    naloxone (NARCAN) injection 0.2 mg, 0.2 mg, Intravenous, Q2 Min PRN **OR** naloxone (NARCAN) injection 0.4 mg, 0.4 mg, Intravenous, Q2 Min PRN **OR** naloxone (NARCAN) injection 0.2 mg, 0.2 mg, Intramuscular, Q2 Min PRN **OR** naloxone (NARCAN) injection 0.4 mg, 0.4 mg, Intramuscular, Q2 Min PRN, Flash Blunt DO    ondansetron (ZOFRAN ODT) ODT tab 4 mg, 4 mg, Oral, Q6H PRN **OR** ondansetron (ZOFRAN) injection 4 mg, 4 mg, Intravenous, Q6H PRN, Milo Sawyer DO    Patient is already receiving anticoagulation with heparin, enoxaparin (LOVENOX), warfarin (COUMADIN)  or other anticoagulant medication, , Does not apply, Continuous PRN, Milo Sawyer DO    piperacillin-tazobactam (ZOSYN) 3.375 g vial to attach to  mL bag, 3.375 g, Intravenous, Q8H, Flash Blunt DO, 3.375 g at 11/13/24 0059    polyethylene glycol (MIRALAX) Packet 17 g, 17 g, Oral, BID PRN, Milo Sawyer DO    potassium chloride aldair ER (KLOR-CON M20) CR tablet 20 mEq, 20 mEq, Oral, BID, Flash Blunt DO    prochlorperazine (COMPAZINE) injection 5 mg, 5 mg, Intravenous, Q6H PRN **OR** prochlorperazine  (COMPAZINE) tablet 5 mg, 5 mg, Oral, Q6H PRN **OR** prochlorperazine (COMPAZINE) suppository 12.5 mg, 12.5 mg, Rectal, Q12H PRN, Milo Sawyer DO    senna-docusate (SENOKOT-S/PERICOLACE) 8.6-50 MG per tablet 1 tablet, 1 tablet, Oral, BID PRN **OR** senna-docusate (SENOKOT-S/PERICOLACE) 8.6-50 MG per tablet 2 tablet, 2 tablet, Oral, BID PRN, Milo Sawyer DO    sodium chloride (PF) 0.9% PF flush 3 mL, 3 mL, Intracatheter, Q8H, Milo Sawyer DO    sodium chloride (PF) 0.9% PF flush 3 mL, 3 mL, Intracatheter, q1 min prn, Milo Sawyer DO    torsemide (DEMADEX) tablet 60 mg, 60 mg, Oral, Daily, Flash Blunt DO    vancomycin (VANCOCIN) 1,750 mg in 0.9% NaCl 517.5 mL intermittent infusion, 1,750 mg, Intravenous, Q24H, Flash Blunt DO    [Held by provider] warfarin ANTICOAGULANT (COUMADIN) tablet 1-2 mg, 1-2 mg, Oral, See Admin Instructions, Flash Blunt DO    Current Outpatient Medications:     acetaminophen (TYLENOL) 500 MG tablet, Take 2 tablets (1,000 mg) by mouth every 8 hours as needed for mild pain Pain 1-5/10, Disp: , Rfl:     atorvastatin (LIPITOR) 40 MG tablet, Take 1 tablet (40 mg) by mouth every evening, Disp: , Rfl:     Benzocaine (HURRICAINE/TOPEX) 20 % AERO spray, 1-2 sprays daily. TOPICAL spray 20% daily with wound care, Disp: , Rfl:     collagenase (SANTYL) 250 UNIT/GM external ointment, Apply topically daily. Apply to heel and lateral foot, Disp: , Rfl:     HYDROmorphone (DILAUDID) 2 MG tablet, Take 4 mg by mouth daily as needed for severe pain (take 2 tablets by mouth once daily as needed)., Disp: , Rfl:     insulin degludec (TRESIBA) 200 UNIT/ML pen, Inject 34 Units subcutaneously every morning Hold for BG < 100, Disp: , Rfl:     Lidocaine (LIDOCARE) 4 % Patch, Place 1 patch onto the skin daily as needed for moderate pain (To prevent lidocaine toxicity, patient should be patch free for 12 hrs daily.)., Disp: , Rfl:     melatonin 1 MG TABS tablet, Take 1 tablet (1 mg) by mouth nightly as  needed for sleep, Disp: , Rfl:     Menthol, Topical Analgesic, (BIOFREEZE EX), Apply 1 Application topically 3 times daily as needed. Apply to neck and shoulders, Disp: , Rfl:     methadone (DOLOPHINE) 5 MG tablet, Take 1 tablet (5 mg) by mouth 3 times daily as needed for severe pain, Disp: 30 tablet, Rfl: 0    methadone (DOLOPHINE) 5 MG tablet, Take 1 tablet (5 mg) by mouth 4 times daily, Disp: 120 tablet, Rfl: 0    miconazole (MICATIN) 2 % external powder, Apply topically 2 times daily, Disp: , Rfl:     mineral oil-hydrophilic petrolatum (AQUAPHOR) external ointment, Apply to left thigh wound BID, Disp: , Rfl:     Multiple Vitamin (TAB-A-YUN) TABS, Take 1 tablet by mouth daily (with lunch), Disp: , Rfl:     ondansetron (ZOFRAN ODT) 4 MG ODT tab, Take 1 tablet (4 mg) by mouth every 6 hours as needed for nausea or vomiting, Disp: , Rfl:     polyethylene glycol (MIRALAX) 17 GM/Dose powder, Take 17 g by mouth 2 times daily as needed for constipation, Disp: , Rfl:     potassium chloride ER (K-TAB) 20 MEQ CR tablet, Take 20 mEq by mouth 2 times daily, Disp: , Rfl:     senna-docusate (SENOKOT-S/PERICOLACE) 8.6-50 MG tablet, Take 1 tablet by mouth 2 times daily as needed for constipation, Disp: , Rfl:     torsemide (DEMADEX) 20 MG tablet, Take 60 mg by mouth daily, Disp: , Rfl:     WARFARIN SODIUM PO, Take 1-2 mg by mouth See Admin Instructions. 2 mg: Mon, Wed, Fri, Sat 1 mg: Tue, Thur, Sun (As of 11/12/24), Disp: , Rfl:     wound support modular (EXPEDITE) LIQD bottle, Take 60 mLs by mouth daily, Disp: , Rfl:     Zinc oxide 10 % CREA, Externally apply topically 3 times daily Apply to coccyx area once per shift after brief changes, Disp: , Rfl:     metolazone (ZAROXOLYN) 2.5 MG tablet, TAKE 1 TAB BY MOUTH ON THURSDAY FOR LYMPHEDEMA (Patient not taking: Reported on 11/12/2024), Disp: , Rfl:      SOCIAL HABITS:    Tobacco Use      Smoking status: Never      Smokeless tobacco: Never     Social History    Substance and  "Sexual Activity      Alcohol use: No       History   Drug Use No      FAMILY HISTORY:    Family History   Problem Relation Age of Onset    Hypertension Mother     Heart Disease Father         heart attack and cardiomegaly    Diabetes Sister         type 2    Hypertension Sister     Respiratory Sister     Psychotic Disorder Sister         bipolar    Cancer Maternal Grandmother         throat    Cancer Paternal Grandmother         gastric     REVIEW OF SYSTEMS:    A 12 point ROS was reviewed and except for what is listed in the HPI above, all others are negative    PE:    Vital signs:  Temp: 98.3  F (36.8  C) Temp src: Oral BP: 111/65 Pulse: 83   Resp: 18 SpO2: 97 % O2 Device: None (Room air)     Weight: 104.3 kg (230 lb)  Estimated body mass index is 37.12 kg/m  as calculated from the following:    Height as of 7/24/24: 1.676 m (5' 6\").    Weight as of this encounter: 104.3 kg (230 lb).       Wt Readings from Last 1 Encounters:   11/12/24 104.3 kg (230 lb)     Body mass index is 37.12 kg/m .    EXAM:  GENERAL: This is a well-developed 79 year old female who appears her stated age  CHEST/LUNG: Normal respiratory effort   MUSCULOSKELETAL: S/P L BKA  HEME/LYMPH: No lymphedema  NEUROLOGIC: Focally intact, Alert and oriented x 3.   PSYCH: appropriate affect  VASCULAR: clean and dry dressing in place to right foot, uploaded images of right foot reviewed    DIAGNOSTIC STUDIES:     Images:  MR Ankle Right w/o Contrast    Result Date: 11/13/2024  EXAM: MR ANKLE RIGHT W/O CONTRAST, MR FOOT RIGHT W/O CONTRAST LOCATION: [facility] DATE: 11/13/2024 INDICATION: Right foot and ankle infection. COMPARISON: None available. TECHNIQUE: Unenhanced. FINDINGS: Study degraded by motion, particularly patient pain. IV access additionally failed, precluding the administration of intravenous contrast. There is a large ulceration at the posterior inferior aspect of the heel. This extends deep to abut the " posteroinferior calcaneus, which demonstrates a shallow cortical erosion. Marrow edema extends anteriorly from this erosion into the mid calcaneus and  is compatible with acute osteomyelitis. There is an additional ulceration along the lateral aspect of the forefoot, which extends deep to abut the fifth metatarsal base.. There is associated cortical erosion of the adjacent fifth metatarsal base, with diffuse marrow edema of the fifth metatarsal compatible with acute osteomyelitis. Additional marrow edema within the cuboid, lateral cuneiform, and the third and fourth metatarsal bases is also consistent with acute osteomyelitis. There is also the appearance of marrow edema within the fifth toe, though this may be artifactual. No evidence of osteonecrosis or abscess formation, within limitation of the noncontrast technique. There is some notable undermining associated with the distal ulceration at the lateral forefoot, which extends dorsally along the distal fifth metatarsal. There is diffuse, superficial soft tissue swelling throughout the foot and ankle, which could be a chewable to a combination of bland edema and cellulitis. No evidence of fracture, stress fracture, or avascular necrosis. No evidence of abscess. Subacute on chronic denervation of the intrinsic foot musculature, though some degree of edema within the intrinsic musculature at the lateral forefoot/midfoot could be secondary to a superimposed myositis. Foot and ankle tendons appear normal in course and caliber. Plantar fascia appears intact.     IMPRESSION: Acute osteomyelitis of the posterior calcaneus, fifth metatarsal, third and fourth metatarsal bases, cuboid, and lateral cuneiform, as detailed above. No evidence of abscess or osteonecrosis.     MR Foot Right w/o Contrast    Result Date: 11/13/2024  EXAM: MR ANKLE RIGHT W/O CONTRAST, MR FOOT RIGHT W/O CONTRAST LOCATION: [facility] DATE: 11/13/2024 INDICATION: Right foot  and ankle infection. COMPARISON: None available. TECHNIQUE: Unenhanced. FINDINGS: Study degraded by motion, particularly patient pain. IV access additionally failed, precluding the administration of intravenous contrast. There is a large ulceration at the posterior inferior aspect of the heel. This extends deep to abut the posteroinferior calcaneus, which demonstrates a shallow cortical erosion. Marrow edema extends anteriorly from this erosion into the mid calcaneus and  is compatible with acute osteomyelitis. There is an additional ulceration along the lateral aspect of the forefoot, which extends deep to abut the fifth metatarsal base.. There is associated cortical erosion of the adjacent fifth metatarsal base, with diffuse marrow edema of the fifth metatarsal compatible with acute osteomyelitis. Additional marrow edema within the cuboid, lateral cuneiform, and the third and fourth metatarsal bases is also consistent with acute osteomyelitis. There is also the appearance of marrow edema within the fifth toe, though this may be artifactual. No evidence of osteonecrosis or abscess formation, within limitation of the noncontrast technique. There is some notable undermining associated with the distal ulceration at the lateral forefoot, which extends dorsally along the distal fifth metatarsal. There is diffuse, superficial soft tissue swelling throughout the foot and ankle, which could be a chewable to a combination of bland edema and cellulitis. No evidence of fracture, stress fracture, or avascular necrosis. No evidence of abscess. Subacute on chronic denervation of the intrinsic foot musculature, though some degree of edema within the intrinsic musculature at the lateral forefoot/midfoot could be secondary to a superimposed myositis. Foot and ankle tendons appear normal in course and caliber. Plantar fascia appears intact.     IMPRESSION: Acute osteomyelitis of the posterior calcaneus, fifth metatarsal, third and  fourth metatarsal bases, cuboid, and lateral cuneiform, as detailed above. No evidence of abscess or osteonecrosis.     US ALMA Doppler No Exercise 1-2 Levels Right    Result Date: 11/12/2024  EXAM: US LOWER EXTREMITY ARTERIAL RT, US ALMA DOPPLER NO EXERCISE, 1-2 LEVELS, RIGHT LOCATION: [facility] DATE: 11/12/2024 INDICATION: Eval PAD COMPARISON: None. TECHNIQUE: Duplex utilizing 2D gray-scale imaging, Doppler interrogation with color-flow and spectral waveform analysis. ALMA FINDINGS: RIGHT                                               Brachial: -- Ankle (PT): Unable to be obtained due to open wounds. Ankle (DP): Unable to be obtained due to open wounds. Digit: 26 Index: 0.21   LEFT Brachial: 123 Ankle (PT): Amputation. Ankle (DP): Amputation. Digit: Amputation. RIGHT LOWER EXTREMITY ARTERIAL ASSESSMENT: External iliac artery: 135 cm/s Common femoral artery: 129 cm/s Profunda femoris artery: 80 cm/s SFA (proximal): 130 cm/s SFA (mid): 105 cm/s SFA (distal): 195 cm/s Popliteal artery: 181-182 cm/s Anterior tibial artery: Unable to be obtained due to open wounds. Posterior tibial artery: Unable to be obtained due to open wounds. Dorsalis pedis artery: Unable to be obtained due to open wounds.      IMPRESSION: 1.  RIGHT LOWER EXTREMITY: ALMA is unable to be obtained due to wounds. The digital pressure is 26 mmHg which suggests poor wound healing potential. The duplex study reveals a large amount of arterial calcification. Monophasic waveforms are seen within the external iliac and common femoral artery suggesting a pelvic inflow stenosis. Brisk monophasic waveforms to the level of the popliteal artery without an additional identified high-grade stenosis. The below-knee arteries are not evaluated due to open wounds. 2.  LEFT LOWER EXTREMITY: Amputation.    US Lower Extremity Arterial rt    Result Date: 11/12/2024  EXAM: US LOWER EXTREMITY ARTERIAL RT, US ALMA DOPPLER NO EXERCISE, 1-2 LEVELS, RIGHT  LOCATION: [facility] DATE: 11/12/2024 INDICATION: Eval PAD COMPARISON: None. TECHNIQUE: Duplex utilizing 2D gray-scale imaging, Doppler interrogation with color-flow and spectral waveform analysis. ALMA FINDINGS: RIGHT                                               Brachial: -- Ankle (PT): Unable to be obtained due to open wounds. Ankle (DP): Unable to be obtained due to open wounds. Digit: 26 Index: 0.21   LEFT Brachial: 123 Ankle (PT): Amputation. Ankle (DP): Amputation. Digit: Amputation. RIGHT LOWER EXTREMITY ARTERIAL ASSESSMENT: External iliac artery: 135 cm/s Common femoral artery: 129 cm/s Profunda femoris artery: 80 cm/s SFA (proximal): 130 cm/s SFA (mid): 105 cm/s SFA (distal): 195 cm/s Popliteal artery: 181-182 cm/s Anterior tibial artery: Unable to be obtained due to open wounds. Posterior tibial artery: Unable to be obtained due to open wounds. Dorsalis pedis artery: Unable to be obtained due to open wounds.      IMPRESSION: 1.  RIGHT LOWER EXTREMITY: ALMA is unable to be obtained due to wounds. The digital pressure is 26 mmHg which suggests poor wound healing potential. The duplex study reveals a large amount of arterial calcification. Monophasic waveforms are seen within the external iliac and common femoral artery suggesting a pelvic inflow stenosis. Brisk monophasic waveforms to the level of the popliteal artery without an additional identified high-grade stenosis. The below-knee arteries are not evaluated due to open wounds. 2.  LEFT LOWER EXTREMITY: Amputation.    LABS:      Sodium   Date Value Ref Range Status   11/13/2024 139 135 - 145 mmol/L Final   11/12/2024 138 135 - 145 mmol/L Final   09/09/2024 132 (L) 135 - 145 mmol/L Final   02/22/2008 140 133 - 144 mmol/L Final   10/15/2007 141 133 - 144 mmol/L Final   09/18/2007 142 133 - 144 mmol/L Final     Urea Nitrogen   Date Value Ref Range Status   11/13/2024 23.3 (H) 8.0 - 23.0 mg/dL Final   11/12/2024 24.5 (H) 8.0 - 23.0  mg/dL Final   09/09/2024 27.4 (H) 8.0 - 23.0 mg/dL Final   06/28/2022 42 (H) 8 - 28 mg/dL Final   02/22/2008 15 7 - 30 mg/dL Final   10/15/2007 17 7 - 30 mg/dL Final   09/18/2007 14 7 - 30 mg/dL Final     Hemoglobin   Date Value Ref Range Status   11/13/2024 8.8 (L) 11.7 - 15.7 g/dL Final   11/12/2024 9.8 (L) 11.7 - 15.7 g/dL Final   08/12/2024 10.3 (L) 11.7 - 15.7 g/dL Final   02/22/2008 12.6 11.7 - 15.7 g/dL Final   09/18/2007 14.3 11.7 - 15.7 g/dL Final   06/16/2006 13.1 11.7 - 15.7 g/dL Final     Platelet Count   Date Value Ref Range Status   11/13/2024 279 150 - 450 10e3/uL Final   11/12/2024 326 150 - 450 10e3/uL Final   08/12/2024 302 150 - 450 10e3/uL Final   02/22/2008 177 150 - 450 10e9/L Final   09/18/2007 220 150 - 450 10e9/L Final   06/16/2006 207 150 - 450 10e9/L Final     BNP   Date Value Ref Range Status   02/24/2006 21 5 - 100 pg/mL Final   12/07/2005 28 5 - 100 pg/mL Final     INR   Date Value Ref Range Status   11/13/2024 1.73 (H) 0.85 - 1.15 Final   11/12/2024 1.69 (H) 0.85 - 1.15 Final   08/07/2024 2.28 (H) 0.85 - 1.15 Final   06/16/2006 0.98 0.86 - 1.14 Final     INR (External)   Date Value Ref Range Status   07/25/2024 5.0 (A) 0.9 - 1.1 Final   07/22/2024 3.6  Final   07/17/2024 1.6 (A) 0.9 - 1.1 Final     Total time spent 30 minutes face to face with patient with more than 50% time spent in counseling and coordination of care.    Bettye L. Felipe, PA-C  [facility]

## 2024-11-13 NOTE — MEDICATION SCRIBE - ADMISSION MEDICATION HISTORY
Medication Scribe Admission Medication History    Admission medication history is complete. The information provided in this note is only as accurate as the sources available at the time of the update.    Information Source(s): Facility (TCU/NH/) medication list/MAR and CareEverywhere/SureScripts via in-person    Pertinent Information: Patient is coming from [place]. Received MAR and updated PTA list accordingly.     Confirmed methadone and hydromorphone in PDMP with the pharmacist on duty.     Has taken 2 of 4 scheduled methadone doses, last dose is 1300 today.     Patient is taking 34 unit(s) of insulin degludec QAM. MAR recorded that she refused this morning's dose.     Metolazone is on hold for now until reevaluated for how she is responding to torsemide (per prior note in chart).     Changes made to PTA medication list:  Added:   Collagenase 250 unit(s)/g  Hydromorphone 2 mg (confirmed via PDMP)  Lidocaine patch 4%   Deleted:   Acetic acid 1% solution  Arginine pack  Insulin aspart 100 unit(s)/mL  Changed:   Warfarin instructions updated to most recent MAR's SIG. 2 mg M,W,F,Sat and 1 mg Tu,Th,Sun    Allergies reviewed with patient and updates made in EHR: yes    Medication History Completed By: Ovidio Richmond 11/12/2024 6:35 PM    Current Facility-Administered Medications for the 11/12/24 encounter (Hospital Encounter)   Medication    lidocaine (XYLOCAINE) 5 % ointment     PTA Med List   Medication Sig Last Dose/Taking    acetaminophen (TYLENOL) 500 MG tablet Take 2 tablets (1,000 mg) by mouth every 8 hours as needed for mild pain Pain 1-5/10 Unknown    atorvastatin (LIPITOR) 40 MG tablet Take 1 tablet (40 mg) by mouth every evening 11/11/2024 at  8:00 PM    Benzocaine (HURRICAINE/TOPEX) 20 % AERO spray 1-2 sprays daily. TOPICAL spray 20% daily with wound care Unknown    collagenase (SANTYL) 250 UNIT/GM external ointment Apply topically daily. Apply to heel and lateral foot 11/11/2024 Noon     HYDROmorphone (DILAUDID) 2 MG tablet Take 4 mg by mouth daily as needed for severe pain (take 2 tablets by mouth once daily as needed). 11/11/2024 at 11:35 AM    insulin degludec (TRESIBA) 200 UNIT/ML pen Inject 34 Units subcutaneously every morning Hold for BG < 100 11/11/2024 at  9:00 AM    Lidocaine (LIDOCARE) 4 % Patch Place 1 patch onto the skin daily as needed for moderate pain (To prevent lidocaine toxicity, patient should be patch free for 12 hrs daily.). Unknown    melatonin 1 MG TABS tablet Take 1 tablet (1 mg) by mouth nightly as needed for sleep Unknown    Menthol, Topical Analgesic, (BIOFREEZE EX) Apply 1 Application topically 3 times daily as needed. Apply to neck and shoulders Unknown    methadone (DOLOPHINE) 5 MG tablet Take 1 tablet (5 mg) by mouth 3 times daily as needed for severe pain 11/11/2024 at 11:34 AM    methadone (DOLOPHINE) 5 MG tablet Take 1 tablet (5 mg) by mouth 4 times daily 11/12/2024 at  1:00 PM    miconazole (MICATIN) 2 % external powder Apply topically 2 times daily 11/12/2024 at  9:00 AM    mineral oil-hydrophilic petrolatum (AQUAPHOR) external ointment Apply to left thigh wound BID 11/12/2024 at  9:00 AM    Multiple Vitamin (TAB-A-YUN) TABS Take 1 tablet by mouth daily (with lunch) 11/11/2024 at 10:30 AM    ondansetron (ZOFRAN ODT) 4 MG ODT tab Take 1 tablet (4 mg) by mouth every 6 hours as needed for nausea or vomiting Unknown    polyethylene glycol (MIRALAX) 17 GM/Dose powder Take 17 g by mouth 2 times daily as needed for constipation Unknown    potassium chloride ER (K-TAB) 20 MEQ CR tablet Take 20 mEq by mouth 2 times daily 11/12/2024 at  9:00 AM    senna-docusate (SENOKOT-S/PERICOLACE) 8.6-50 MG tablet Take 1 tablet by mouth 2 times daily as needed for constipation Unknown    torsemide (DEMADEX) 20 MG tablet Take 60 mg by mouth daily 11/12/2024 at  9:00 AM    WARFARIN SODIUM PO Take 1-2 mg by mouth See Admin Instructions. 2 mg: Mon, Wed, Fri, Sat  1 mg: Tue, Thur,  Sun  (As of 11/12/24) 11/11/2024 at  8:00 PM    wound support modular (EXPEDITE) LIQD bottle Take 60 mLs by mouth daily Past Month    Zinc oxide 10 % CREA Externally apply topically 3 times daily Apply to coccyx area once per shift after brief changes 11/12/2024 at  2:00 PM

## 2024-11-13 NOTE — PLAN OF CARE
Pt arrived to floor from ED at 1400. Pt NPO at midnight for surgery tomorrow 11/14. AC/HS checks. Perwic in place. Dressing done earlier in today on 11/13. Receiving IV antibiotics.   Anastasia Gardner, RN on 11/13/2024 at 2:17 PM   Problem: Skin Injury Risk Increased  Goal: Skin Health and Integrity  Outcome: Not Progressing  Intervention: Plan: Nurse Driven Intervention: Moisture Management  Recent Flowsheet Documentation  Taken 11/13/2024 1407 by Anastasia Gardner, RN  Moisture Interventions: Urinary collection device  Intervention: Plan: Nurse Driven Intervention: Friction and Shear  Recent Flowsheet Documentation  Taken 11/13/2024 1407 by Anastasia Gardner, RN  Friction/Shear Interventions: HOB 30 degrees or less   Goal Outcome Evaluation:

## 2024-11-13 NOTE — PROGRESS NOTES
Brief Progress Note  -PTA medication reconciliation completed.  -Given patient is going to be n.p.o. overnight, prior to anticipated surgery, will hold off on a.m. Tresiba for now.  -Every 4 hour glucose checks with moderate sliding scale insulin ordered  -Awaiting MRI  -Hemoglobin A1c 8.2 which is improved from 9.9 - 8 months ago  -High-sensitivity troponin evaded at 38, repeat ordered and pending  -INR 1.69, given upcoming surgery will hold off on warfarin dosing for now  -EKG reviewed: Rhythm irregular with ventricular rate of 94.  Likely represents atrial fibrillation, clear P wave morphology is not easily identifiable, low voltage noted on EKG.  QTc reassuring at 390.  -When compared to previous EKG, EKG from today is very similar in morphology, Jan -2 -2024 EKG showing ventricular rate of 97 atrial fibrillation with low voltage and QTc of 403.  -Could consider possibility of further rate control, pulse is not tachycardic today at 90  Flash Blunt, DO

## 2024-11-13 NOTE — PROGRESS NOTES
Update/Progress:    11:02 PM I was called by MRI, and the patient in the machine right now and she has quite a bit of pain.  They are asking if we are able to do any different imaging or assist with pain control.  She has already had half milligram of dilaudid.  The patient is under the care of residency service at this time, I had already signed her out.  Because of the time sensitive nature of this I ordered half milligram of Dilaudid myself, to see if this will assist with imaging compliance.

## 2024-11-13 NOTE — PHARMACY-VANCOMYCIN DOSING SERVICE
"Pharmacy Vancomycin Initial Note  Date of Service 2024  Patient's  1945  79 year old, female    Indication: Bone and Joint Infection and Skin and Soft Tissue Infection    Current estimated CrCl = Estimated Creatinine Clearance: 68.7 mL/min (based on SCr of 0.81 mg/dL).    Creatinine for last 3 days  2024:  2:38 PM Creatinine 0.81 mg/dL    Recent Vancomycin Level(s) for last 3 days  No results found for requested labs within last 3 days.      Vancomycin IV Administrations (past 72 hours)                     vancomycin (VANCOCIN) 2,000 mg in 0.9% NaCl 500 mL intermittent infusion (mg) 2,000 mg New Bag 24 1543                    Nephrotoxins and other renal medications (From now, onward)      Start     Dose/Rate Route Frequency Ordered Stop    24  piperacillin-tazobactam (ZOSYN) 3.375 g vial to attach to  mL bag        Note to Pharmacy: For [facility], [facility] and [facility]: For Zosyn-naive patients, use the \"Zosyn initial dose + extended infusion\" order panel.    3.375 g  over 240 Minutes Intravenous EVERY 8 HOURS 24 1756              Contrast Orders - past 72 hours (72h ago, onward)      None            [employer] Prediction of Planned Initial Vancomycin Regimen  Loading dose: 2,000 mg IV x 1  24 @ 1543  Regimen: 1750 mg IV every 24 hours.  Start time: 15:43 on 2024  Exposure target: AUC24 (range)400-600 mg/L.hr   AUC24,ss: 468 mg/L.hr  Probability of AUC24 > 400: 68 %  Ctrough,ss: 13.1 mg/L  Probability of Ctrough,ss > 20: 18 %  Probability of nephrotoxicity (Lodise COSTA ): 8 %          Plan:  Start vancomycin 1,750 mg IV q24h.   Vancomycin monitoring method: AUC  Vancomycin therapeutic monitoring goal: 400-600 mg*h/L  Pharmacy will check vancomycin levels as appropriate in 1-3 Days.    Serum creatinine levels will be ordered daily for the first week of therapy and at least twice weekly for subsequent weeks.      Thank you for the consult.   Terrie Steel, " PharmD, BCPS

## 2024-11-13 NOTE — TELEPHONE ENCOUNTER
Surgery Cancellation    Per Dr. Strong, cancel procedure today at [facility]. Pt will need a BKA with vascular.     Writer called [facility] OR, spoke to [facility]. Surgery Cancelled.     Oni added to case cancellations tracking board.    PO appointments cancelled.,

## 2024-11-13 NOTE — CONSULTS
Consultation - Infectious Disease  [facility]  Linda Loredo,  1945, MRN [id number]    Admitting Dx: Cellulitis of right lower extremity [L03.115]  Pressure ulcer of right heel, stage 4 (H) [L89.614]    PCP: Louann Peraza Physician, None       ASSESSMENT   79-year-old woman with a history of diabetes, A-fib, CHF, fibromyalgia, hyperlipidemia, hypertension who is admitted with right foot infection.    Right foot osteomyelitis.  Presented to podiatry clinic for the first time yesterday with chronic right lower extremity wounds.  Admitted to the hospital for IV antibiotics.  MRI showing osteomyelitis in the posterior calcaneus, fifth metatarsal, and third and fourth metatarsal base.  Patient's foot is felt to be nonsalvageable.  Wound cultures with GPC's on Gram stain; culture pending.  Vascular surgery has been consulted for BKA.  History of left BKA    Active Problems:    Cellulitis of right lower extremity       PLAN   -Continue vancomycin and Zosyn  -Agree with planned amputation  -Final antibiotic plan to depend on cultures and extent of amputation    Thank you for this consult. Will follow.    Jez Cage MD  [facility]  Direct messaging: [employer] Paging  On-Call ID provider: [phone], option: [phone]      ===========================================      Chief Complaint   <principal problem not specified>       HPI     We have been requested by Jimmy Ann MD to evaluate Linda Loredo for the above.    History obtained by patient and patient's daughter    Linda Loredo is a 79 year old woman with a history of diabetes, A-fib, CHF, fibromyalgia, hyperlipidemia, hypertension who is admitted from podiatry clinic with right foot infection.  The patient has chronic right lower extremity wounds.  She is followed in assisted living where there was concern for worsening foot wounds.  She had noticed more purulence lately.  She was  seen in podiatry clinic yesterday where foot wounds probed to bone.  Foul smell was noted in clinic.  She was directed to the ER for IV antibiotics.  She underwent an MRI showing extensive osteomyelitis.  Has been recommended that she undergo amputation rather than local debridement.  She tells me that she is scheduled for surgery tomorrow.  She was not on antibiotics prior to admission.  She denies any fevers prior to admission.          Review of Systems   Ten systems reviewed and negative except for what is noted in the HPI       Medical History  Past Medical History:   Diagnosis Date    Abscess of left foot 10/31/2022    Acute cystitis without hematuria 07/11/2024    Acute kidney injury (H) 07/11/2024    Adverse effect of anticoagulants, initial encounter 04/16/2024    GUSTAVO (acute kidney injury) (H) 05/26/2023    Allergic rhinitis 04/08/2008    Anemia 07/11/2024    Anticoagulation monitoring, INR range 2-3 07/06/2022    Anxiety 10/20/2011    Cardiac murmur, unspecified 05/29/2019    Cellulitis - bilateral lower extremities 05/27/2023    Cellulitis of buttock 05/26/2023    Chronic atrial fibrillation (H) 05/30/2022    New onset during 5/2022 admission      Chronic kidney disease, stage 3b (H) 06/12/2024    Chronic pain 04/26/2010    Chronic right-sided heart failure (H) 11/16/2023    Constipation 04/16/2024    Decubitus ulcers - 5 present on buttocks/perineal region, numerous scabbed over and unstagable on shin and bilateral feet with some bloody blisters noted on feet 05/27/2023    Depressive disorder, not elsewhere classified     Diabetic foot ulcer (H) 09/29/2023    Diabetic polyneuropathy associated with type 2 diabetes mellitus (H) 04/07/2006 February 26, 2007: on gabapentin.  She has been switched from gabapentin to lyrica.       Elevated liver enzymes 10/20/2011    Essential hypertension with goal blood pressure less than 140/90 05/09/2005    Fibromyalgia 10/20/2011    Fracture of fibula, distal, left,  closed 10/20/2011    ORIF 10/13/11      Herpes zoster 2005: On gabapentin and lidoderm. She has been switched from gabapentin to lyrica.       Herpes zoster without mention of complication     Hx of osteomyelitis 2024    S.p left BKA       Hypercalcemia 2007    Hypoglycemia 2023    Hypotension 10/27/2011    Insomnia 07/15/2005    Lymphedema, not elsewhere classified 2024    Major depressive disorder, recurrent episode, moderate (H) 2008    Metabolic encephalopathy 2024    Microalbuminuria due to type 2 diabetes mellitus (H) 10/25/2016    Mild intermittent asthma     Mixed hyperlipidemia 2005    Mixed hyperlipidemia due to type 2 diabetes mellitus (H) 2008    Formatting of this note is different from the original.     22 ASCVD 10 year risk: 37.9%      Last Lipids:  Last Lipids:  Chol: 2021 130   63  HDL: 2021 46  Non-HDL: 2021 84  Chol/HDL Ratio: 2021 2.83  LDL DIRECT: . No results found in past 5 years   LDL CHOLESTEROL (mg/dL)   Date Value   2021 71      22   The 10-year ASCVD risk score (Teddy LUIS Jr.    Mixed stress and urge urinary incontinence 2005: On Detrol LA.      Non-healing wound of left heel 2023    Obesity with BMI >35 and comorbidity 2006: Body mass index is 48.07 kg/(m^2).       Opioid dependence, uncomplicated (H) 2023    BERLIN (obstructive sleep apnea) 10/23/2007    Sleep study 2004 reportedly showing severe OBSTRUCTIVE SLEEP APNEA and recommend CPAP, Not available for review. Patient is untreated       Osteoarthritis 2006    Osteomyelitis (H) 10/24/2023    Osteopenia 2015    Other abnormalities of gait and mobility 11/15/2023    Other urinary incontinence     Pressure injury of deep tissue of sacral region 2024    Primary hyperparathyroidism (H) 2013    Work up 2013 Dr. Villareal       Pulmonary hypertension (H) 07/11/2024    Pulmonary hypertension by echo and mild to moderate pulmonary hypertension by angiogram 2006      Sepsis without acute organ dysfunction, due to unspecified organism (H) 05/26/2023    Sepsis, due to unspecified organism, unspecified whether acute organ dysfunction present (H) 07/11/2024    Skin ulcer of right lower leg with fat layer exposed (H) 02/26/2024    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) 07/11/2024    left lower extremity amputation secondary to osteomyelitis 10/2023, bone biopsy grew MRSA.       Supratherapeutic INR 05/27/2023    Type 2 diabetes mellitus with complication, with long-term current use of insulin (H) 04/18/2005    diagnosed in 2001       Type II or unspecified type diabetes mellitus with renal manifestations, uncontrolled(250.42) (H)     Type II or unspecified type diabetes mellitus without mention of complication, not stated as uncontrolled     Umbilical hernia without obstruction and without gangrene 12/13/2018    Unspecified essential hypertension     Unspecified open wound, left foot, subsequent encounter 09/22/2023    UTI (urinary tract infection) - possible 05/26/2023    Venous stasis 04/16/2024    Vitamin D deficiency 09/08/2022    Warfarin-induced coagulopathy (H) 05/27/2023    Surgical History  She  has a past surgical history that includes Cholecystectomy; ligation of fallopian tube; Open reduction internal fixation ankle (10/13/11); pinning of left 2nd toe; Incision and drainage foot, combined (Left, 9/26/2023); IR Lower Extremity Angiogram Left (10/3/2023); Amputate leg below knee (Left, 10/7/2023); Amputate leg below knee (Left, 10/14/2023); Graft skin split thickness from trunk to head (Left, 1/2/2024); Apply Wound Vac (Left, 1/2/2024); and Irrigation and debridement lower extremity, combined (Right, 7/14/2024).     Social History  Reviewed, and she  reports that she has never smoked. She has never used  "smokeless tobacco. She reports that she does not drink alcohol and does not use drugs.  Social History     Social History Narrative    Not on file     Family History  family history includes Cancer in her maternal grandmother and paternal grandmother; Diabetes in her sister; Heart Disease in her father; Hypertension in her mother and sister; Psychotic Disorder in her sister; Respiratory in her sister.  family history reviewed and is not pertinent to the presenting problem.            Allergies     Allergies   Allergen Reactions    Prednisone Nausea and Vomiting    Morphine Nausea and Vomiting    Morphine [Fumaric Acid] Nausea and Vomiting    Nitrofurantoin Unknown     Per nursing home         Antibiotics   Vancomycin 11/12-  Zosyn 11/12-    Previous:  None      Physical Exam     Temp:  [97.4  F (36.3  C)-98.3  F (36.8  C)] 97.4  F (36.3  C)  Pulse:  [] 89  Resp:  [18-20] 18  BP: (103-137)/(58-91) 119/60  SpO2:  [93 %-100 %] 96 %    /60   Pulse 89   Temp 97.4  F (36.3  C)   Resp 18   Ht 1.676 m (5' 6\")   Wt 96 kg (211 lb 10.3 oz)   SpO2 96%   BMI 34.16 kg/m      GENERAL:  well-developed, well-nourished, sitting in bed in no acute distress.   HENT:  Head is normocephalic, atraumatic. Oropharynx is moist without exudates or ulcers.  EYES:  Eyes have anicteric sclerae without conjunctival injection or stigmata of endocarditis.   NECK:  Supple.  LUNGS:  Clear to auscultation.  CARDIOVASCULAR:  Regular rate and rhythm with no murmurs, gallops or rubs.  ABDOMEN:  Normal bowel sounds, soft, nontender. No appreciable hepatosplenomegaly  EXT: Status post left BKA.  Right leg and foot pictures reviewed on cell phone.  Patient has chronic venous stasis color changes with venous stasis wounds.  Her right foot is wrapped, but foul smell is noted from the wounds.  SKIN:  No acute rashes. No stigmata of endocarditis.  NEUROLOGIC:  Grossly nonfocal.      Cultures   11/12 right foot wound: GPC's; culture " pending   blood culture: No growth to date    No results found for the last 90 days.       Laboratory results     Recent Labs   Lab 24  0150 24  1438   WBC 7.5 9.4   HGB 8.8* 9.8*    326       Recent Labs   Lab 24  0150 24  1438    138   CO2 33* 33*   BUN 23.3* 24.5*       Recent Labs   Lab 24  1438   CRPI 61.50*           Imaging   Radiology results reviewed    Echocardiogram Complete    Result Date: 2024  [id number]^[place] [address]  Name: ALEXIA BURNS MRN: [id number] : 1945 Study Date: 2024 09:58 AM Age: 79 yrs Gender: Female Patient Location: [facility] Reason For Study: Atrial Fibrillation Ordering Physician: RANDAL ALDANA Performed By: ARTHUR  BSA: 2.1 m2 Height: 66 in Weight: 230 lb HR: 74 BP: 103/58 mmHg ______________________________________________________________________________ Procedure Complete Portable Echo Adult. Definity (NDC #46923-908) given intravenously. Technically difficult study. ______________________________________________________________________________ Interpretation Summary  1. Normal left ventricular size and systolic performance with a visually estimated ejection fraction of 60%. 2. No significant valvular heart disease is identified on this study. 3. Mild right ventricular enlargement with normal right ventricular systolic performance. 4. There is mild biatrial enlargement. ______________________________________________________________________________ Left ventricle: Normal left ventricular size and systolic performance with a visually estimated ejection fraction of 60%. There is normal regional wall motion. There is borderline concentric increase in left ventricular wall thickness.  Assessment of LV Diastolic Function: Patient appears to be in atrial fibrillation which limits assessment of diastolic filling.  Right ventricle:  Mild right ventricular enlargement with normal right ventricular systolic performance.  Left atrium: There is mild left atrial enlargement.  Right atrium: There is mild right atrial enlargement.  IVC: The IVC is borderline dilated.  Aortic valve: The aortic valve is comprised of three cusps. No significant aortic stenosis or aortic insufficiency is detected on this study.  Mitral valve: The mitral valve appears morphologically normal. There is mild mitral insufficiency.  Tricuspid valve: The tricuspid valve is grossly morphologically normal. There is mild tricuspid insufficiency.  Pulmonic valve: The pulmonic valve is grossly morphologically normal.  Thoracic aorta: The aortic root and proximal ascending aorta are of normal dimension.  Pericardium: There is no significant pericardial effusion. ______________________________________________________________________________ ______________________________________________________________________________ MMode/2D Measurements & Calculations IVSd: 1.2 cm LVIDd: 4.6 cm LVIDs: 3.4 cm LVPWd: 1.2 cm FS: 26.3 % LV mass(C)d: 200.8 grams LV mass(C)dI: 94.6 grams/m2 Ao root diam: 3.0 cm asc Aorta Diam: 3.6 cm LVOT diam: 2.0 cm LVOT area: 3.2 cm2 Ao root diam index Ht(cm/m): 1.8 Ao root diam index BSA (cm/m2): 1.4 Asc Ao diam index BSA (cm/m2): 1.7 Asc Ao diam index Ht(cm/m): 2.2 RWT: 0.52  Doppler Measurements & Calculations TR max ray: 213.1 cm/sec TR max P.2 mmHg RV S Ray: 13.5 cm/sec  ______________________________________________________________________________ Report approved by: Giovana Rodríguez 2024 11:04 AM       MR Ankle Right w/o Contrast    Result Date: 2024  EXAM: MR ANKLE RIGHT W/O CONTRAST, MR FOOT RIGHT W/O CONTRAST LOCATION: [facility] DATE: 2024 INDICATION: Right foot and ankle infection. COMPARISON: None available. TECHNIQUE: Unenhanced. FINDINGS: Study degraded by motion, particularly patient pain. IV access  additionally failed, precluding the administration of intravenous contrast. There is a large ulceration at the posterior inferior aspect of the heel. This extends deep to abut the posteroinferior calcaneus, which demonstrates a shallow cortical erosion. Marrow edema extends anteriorly from this erosion into the mid calcaneus and  is compatible with acute osteomyelitis. There is an additional ulceration along the lateral aspect of the forefoot, which extends deep to abut the fifth metatarsal base.. There is associated cortical erosion of the adjacent fifth metatarsal base, with diffuse marrow edema of the fifth metatarsal compatible with acute osteomyelitis. Additional marrow edema within the cuboid, lateral cuneiform, and the third and fourth metatarsal bases is also consistent with acute osteomyelitis. There is also the appearance of marrow edema within the fifth toe, though this may be artifactual. No evidence of osteonecrosis or abscess formation, within limitation of the noncontrast technique. There is some notable undermining associated with the distal ulceration at the lateral forefoot, which extends dorsally along the distal fifth metatarsal. There is diffuse, superficial soft tissue swelling throughout the foot and ankle, which could be a chewable to a combination of bland edema and cellulitis. No evidence of fracture, stress fracture, or avascular necrosis. No evidence of abscess. Subacute on chronic denervation of the intrinsic foot musculature, though some degree of edema within the intrinsic musculature at the lateral forefoot/midfoot could be secondary to a superimposed myositis. Foot and ankle tendons appear normal in course and caliber. Plantar fascia appears intact.     IMPRESSION: Acute osteomyelitis of the posterior calcaneus, fifth metatarsal, third and fourth metatarsal bases, cuboid, and lateral cuneiform, as detailed above. No evidence of abscess or osteonecrosis.     MR Foot Right w/o  Contrast    Result Date: 11/13/2024  EXAM: MR ANKLE RIGHT W/O CONTRAST, MR FOOT RIGHT W/O CONTRAST LOCATION: [facility] DATE: 11/13/2024 INDICATION: Right foot and ankle infection. COMPARISON: None available. TECHNIQUE: Unenhanced. FINDINGS: Study degraded by motion, particularly patient pain. IV access additionally failed, precluding the administration of intravenous contrast. There is a large ulceration at the posterior inferior aspect of the heel. This extends deep to abut the posteroinferior calcaneus, which demonstrates a shallow cortical erosion. Marrow edema extends anteriorly from this erosion into the mid calcaneus and  is compatible with acute osteomyelitis. There is an additional ulceration along the lateral aspect of the forefoot, which extends deep to abut the fifth metatarsal base.. There is associated cortical erosion of the adjacent fifth metatarsal base, with diffuse marrow edema of the fifth metatarsal compatible with acute osteomyelitis. Additional marrow edema within the cuboid, lateral cuneiform, and the third and fourth metatarsal bases is also consistent with acute osteomyelitis. There is also the appearance of marrow edema within the fifth toe, though this may be artifactual. No evidence of osteonecrosis or abscess formation, within limitation of the noncontrast technique. There is some notable undermining associated with the distal ulceration at the lateral forefoot, which extends dorsally along the distal fifth metatarsal. There is diffuse, superficial soft tissue swelling throughout the foot and ankle, which could be a chewable to a combination of bland edema and cellulitis. No evidence of fracture, stress fracture, or avascular necrosis. No evidence of abscess. Subacute on chronic denervation of the intrinsic foot musculature, though some degree of edema within the intrinsic musculature at the lateral forefoot/midfoot could be secondary to a superimposed  myositis. Foot and ankle tendons appear normal in course and caliber. Plantar fascia appears intact.     IMPRESSION: Acute osteomyelitis of the posterior calcaneus, fifth metatarsal, third and fourth metatarsal bases, cuboid, and lateral cuneiform, as detailed above. No evidence of abscess or osteonecrosis.     US ALMA Doppler No Exercise 1-2 Levels Right    Result Date: 11/12/2024  EXAM: US LOWER EXTREMITY ARTERIAL RT, US ALMA DOPPLER NO EXERCISE, 1-2 LEVELS, RIGHT LOCATION: [facility] DATE: 11/12/2024 INDICATION: Eval PAD COMPARISON: None. TECHNIQUE: Duplex utilizing 2D gray-scale imaging, Doppler interrogation with color-flow and spectral waveform analysis. ALMA FINDINGS: RIGHT                                               Brachial: -- Ankle (PT): Unable to be obtained due to open wounds. Ankle (DP): Unable to be obtained due to open wounds. Digit: 26 Index: 0.21   LEFT Brachial: 123 Ankle (PT): Amputation. Ankle (DP): Amputation. Digit: Amputation. RIGHT LOWER EXTREMITY ARTERIAL ASSESSMENT: External iliac artery: 135 cm/s Common femoral artery: 129 cm/s Profunda femoris artery: 80 cm/s SFA (proximal): 130 cm/s SFA (mid): 105 cm/s SFA (distal): 195 cm/s Popliteal artery: 181-182 cm/s Anterior tibial artery: Unable to be obtained due to open wounds. Posterior tibial artery: Unable to be obtained due to open wounds. Dorsalis pedis artery: Unable to be obtained due to open wounds.      IMPRESSION: 1.  RIGHT LOWER EXTREMITY: ALMA is unable to be obtained due to wounds. The digital pressure is 26 mmHg which suggests poor wound healing potential. The duplex study reveals a large amount of arterial calcification. Monophasic waveforms are seen within the external iliac and common femoral artery suggesting a pelvic inflow stenosis. Brisk monophasic waveforms to the level of the popliteal artery without an additional identified high-grade stenosis. The below-knee arteries are not evaluated due to open  wounds. 2.  LEFT LOWER EXTREMITY: Amputation.    US Lower Extremity Arterial rt    Result Date: 11/12/2024  EXAM: US LOWER EXTREMITY ARTERIAL RT, US ALMA DOPPLER NO EXERCISE, 1-2 LEVELS, RIGHT LOCATION: [facility] DATE: 11/12/2024 INDICATION: Eval PAD COMPARISON: None. TECHNIQUE: Duplex utilizing 2D gray-scale imaging, Doppler interrogation with color-flow and spectral waveform analysis. ALMA FINDINGS: RIGHT                                               Brachial: -- Ankle (PT): Unable to be obtained due to open wounds. Ankle (DP): Unable to be obtained due to open wounds. Digit: 26 Index: 0.21   LEFT Brachial: 123 Ankle (PT): Amputation. Ankle (DP): Amputation. Digit: Amputation. RIGHT LOWER EXTREMITY ARTERIAL ASSESSMENT: External iliac artery: 135 cm/s Common femoral artery: 129 cm/s Profunda femoris artery: 80 cm/s SFA (proximal): 130 cm/s SFA (mid): 105 cm/s SFA (distal): 195 cm/s Popliteal artery: 181-182 cm/s Anterior tibial artery: Unable to be obtained due to open wounds. Posterior tibial artery: Unable to be obtained due to open wounds. Dorsalis pedis artery: Unable to be obtained due to open wounds.      IMPRESSION: 1.  RIGHT LOWER EXTREMITY: ALMA is unable to be obtained due to wounds. The digital pressure is 26 mmHg which suggests poor wound healing potential. The duplex study reveals a large amount of arterial calcification. Monophasic waveforms are seen within the external iliac and common femoral artery suggesting a pelvic inflow stenosis. Brisk monophasic waveforms to the level of the popliteal artery without an additional identified high-grade stenosis. The below-knee arteries are not evaluated due to open wounds. 2.  LEFT LOWER EXTREMITY: Amputation.      Data reviewed today: I reviewed all medications, new labs and imaging results over the last 24 hours. I personally reviewed the right foot MR image(s) showing osteomyelitis  .  The patient's care was discussed with the  Patient and Patient's Family.

## 2024-11-13 NOTE — PLAN OF CARE
Problem: Adult Inpatient Plan of Care  Goal: Readiness for Transition of Care  Outcome: Progressing     AO x4, VSS on RA. Pt angry and rude and verbally aggressive towards staff. IV Zosyn given. NPO. Blood culture pending. New dressing on RLE. Purewick in place. Makes needs known. Call light within reach.

## 2024-11-13 NOTE — PROGRESS NOTES
[facility]    Progress Note - Hospitalist Service       Date of Admission:  11/12/2024    Assessment & Plan   Linda Loredo is a 79 year old female admitted on 11/12/2024. She has a history of T2DM on Tresiba, paroxysmal A-fib, CHF, fibromyalgia, HLD, HTN, anemia of chronic disease, lymphedema, and is admitted for cellulitis of a diabetic foot ulcer. Anticipating OR for R BKA on 11/14     Cellulitis of diabetic foot ulcer with necrosis of the bone and underlying osteomyelitis  Pressure ulcer of the right heel, stage 4  Follows with wound care clinic regularly over the past 2 months, seen in clinic on the day of admission by Dr. Strong who had concern for cellulitis/osteo given large eschar and purulence, was able to probe to bone. Per Dr. Strong, patient was started on Vanc/Zosyn upon admission. MRI right foot and ankle showed osteomyelitis involving the calcaneus, metatarsals 3, 4, 5, and lateral cuneiform.  ABIs unable to be obtained due to wounds, however digital pressure suggests poor wound healing potential. Per podiatry and vascular, BKA is recommended and patient is in agreement with this plan. Scheduled for OR on 11/14. Regarding pre-operative assessment: EKG with afib, stable since Jan 2024. Trop 38 -> 35, no concern for acute ischemia. Echo on 11/13 with EF 60%, no significant valvular disease, and mild biatrial enlargement.  -Podiatry, vascular surgery consults   - BKA by vascular scheduled for 11/14   - Podiatry signed off  -ID consult  -Vanc/Zosyn IV (11/12- )  -Agree with planned amputation  -Final antibiotic plan to depend on cultures and extent of amputation  -Hold warfarin for OR tomorrow  -PTA methadone 5mg QID  -Dilaudid 0.3-0.5mg PRN for breakthrough pain  -Tylenol  -NPO at midnight  -Blood cultures pending     T2DM, uncontrolled  Diabetic neuropathy  CKD 3  Last A1c of 9.9 in  3/1/2024, 8.2 on admission. Patient follows with endo for management, last  seen 04/2024. Follows with wound clinic regularly for right leg diabetic ulcers.  Per med rec, taking Tresiba 34 units every morning, no other agents. Cr 0.81.  -Continue Tresiba 28 units every morning  -Medium sliding scale insulin     Chronic pain syndrome  Reports she is taking methadone at home, however dispense report does not show any methadone dispensed since 07/2024.  -PTA methadone 5mg QID     HFpEF  Paroxysmal atrial fibrillation  Last visit with cardiology was at A-fib clinic on 9/16/2022, placed on warfarin given [id number] = 7 (>75, F, CHF, HTN, vasc dz, DM). INR 1.73 on admission. No rate control medications. CHF found in chart on recent admission in 07/28/24, no GDMT medications. Echo on 11/13 with EF 60%, no severe valvular disease, mild biatrial enlargement.  -Hold warfarin for OR tomorrow  -Continue PTA metolazone and torsemide     Chronic conditions:  Chronic normocytic anemia: at baseline of 9-10 on admission.  HTN: normotensive off antihypertensives  HLD: PTA statin  Lymphedema: PTA torsemide        Diet: NPO per Anesthesia Guidelines for Procedure/Surgery Except for: Meds    DVT Prophylaxis: Warfarin  Braga Catheter: Not present  Fluids: PO  Lines: None     Cardiac Monitoring: None  Code Status: Full Code      Disposition Plan      Expected Discharge Date: 11/14/2024                The patient's care was discussed with the Attending Physician, Dr. Jimmy Ann .    Dana Ann MD  Hospitalist Service  [facility]  Securely message with [employer] (more info)  Text page via [employer] Paging/Directory   ______________________________________________________________________    Interval History   No acute events overnight.  Patient was quite shocked and upset by the news this morning that she will need her right leg amputated.  She was feeling upset that the news was not delivered more compassionately.  She is coming around to the reality now however.  Denies chest pain, shortness  of breath, abdominal pain, nausea/vomiting.    Physical Exam   Vital Signs: Temp: 98.3  F (36.8  C) Temp src: Oral BP: 111/65 Pulse: 83   Resp: 18 SpO2: 97 % O2 Device: None (Room air)    Weight: 230 lbs 0 oz    Constitutional: Awake, alert, cooperative, no apparent distress. Somewhat anxious.  Eyes: Lids and lashes normal, extra ocular muscles intact, sclera clear, conjunctiva normal.  ENT: Normocephalic, atraumatic. Moist mucous membranes.  Respiratory: No increased work of breathing, no accessory muscle use, clear to auscultation bilaterally in anterior lung fields, no crackles or wheezing.  Cardiovascular: Regular rate and irregularly irregular rhythm, normal S1 and S2, no S3 or S4, and no murmur noted  GI: Abdomen soft, non-tender, non-distended.  Musculoskeletal: LLE BKA.  RLE wrapped with gauze, no drainage.  Neurologic: Awake, alert, oriented to name, place and time.    Data     I have personally reviewed the following data over the past 24 hrs:    7.5  \   8.8 (L)   / 279     139 98 23.3 (H) /  195 (H)   4.0 33 (H) 0.89 \     Trop: 35 (H) BNP: N/A     TSH: N/A T4: N/A A1C: N/A     INR:  1.73 (H) PTT:  N/A   D-dimer:  N/A Fibrinogen:  N/A       Imaging results reviewed over the past 24 hrs:   Recent Results (from the past 24 hours)   US Lower Extremity Arterial rt    Narrative    EXAM: US LOWER EXTREMITY ARTERIAL RT, US ALMA DOPPLER NO EXERCISE, 1-2 LEVELS, RIGHT  LOCATION: [facility]  DATE: 11/12/2024    INDICATION: Eval PAD  COMPARISON: None.  TECHNIQUE: Duplex utilizing 2D gray-scale imaging, Doppler interrogation with color-flow and spectral waveform analysis.    ALMA FINDINGS:     RIGHT                                                 Brachial: --  Ankle (PT): Unable to be obtained due to open wounds.   Ankle (DP): Unable to be obtained due to open wounds.   Digit: 26 Index: 0.21       LEFT  Brachial: 123  Ankle (PT): Amputation.  Ankle (DP): Amputation.   Digit: Amputation.      RIGHT LOWER EXTREMITY ARTERIAL ASSESSMENT:  External iliac artery: 135 cm/s  Common femoral artery: 129 cm/s  Profunda femoris artery: 80 cm/s  SFA (proximal): 130 cm/s  SFA (mid): 105 cm/s  SFA (distal): 195 cm/s  Popliteal artery: 181-182 cm/s  Anterior tibial artery: Unable to be obtained due to open wounds.   Posterior tibial artery: Unable to be obtained due to open wounds.   Dorsalis pedis artery: Unable to be obtained due to open wounds.        Impression    IMPRESSION:  1.  RIGHT LOWER EXTREMITY: ALMA is unable to be obtained due to wounds. The digital pressure is 26 mmHg which suggests poor wound healing potential. The duplex study reveals a large amount of arterial calcification. Monophasic waveforms are seen within   the external iliac and common femoral artery suggesting a pelvic inflow stenosis. Brisk monophasic waveforms to the level of the popliteal artery without an additional identified high-grade stenosis. The below-knee arteries are not evaluated due to open   wounds.    2.  LEFT LOWER EXTREMITY: Amputation.   US ALMA Doppler No Exercise 1-2 Levels Right    Narrative    EXAM: US LOWER EXTREMITY ARTERIAL RT, US ALMA DOPPLER NO EXERCISE, 1-2 LEVELS, RIGHT  LOCATION: [facility]  DATE: 11/12/2024    INDICATION: Eval PAD  COMPARISON: None.  TECHNIQUE: Duplex utilizing 2D gray-scale imaging, Doppler interrogation with color-flow and spectral waveform analysis.    ALMA FINDINGS:     RIGHT                                                 Brachial: --  Ankle (PT): Unable to be obtained due to open wounds.   Ankle (DP): Unable to be obtained due to open wounds.   Digit: 26 Index: 0.21       LEFT  Brachial: 123  Ankle (PT): Amputation.  Ankle (DP): Amputation.   Digit: Amputation.     RIGHT LOWER EXTREMITY ARTERIAL ASSESSMENT:  External iliac artery: 135 cm/s  Common femoral artery: 129 cm/s  Profunda femoris artery: 80 cm/s  SFA (proximal): 130 cm/s  SFA (mid): 105 cm/s  SFA  (distal): 195 cm/s  Popliteal artery: 181-182 cm/s  Anterior tibial artery: Unable to be obtained due to open wounds.   Posterior tibial artery: Unable to be obtained due to open wounds.   Dorsalis pedis artery: Unable to be obtained due to open wounds.        Impression    IMPRESSION:  1.  RIGHT LOWER EXTREMITY: ALMA is unable to be obtained due to wounds. The digital pressure is 26 mmHg which suggests poor wound healing potential. The duplex study reveals a large amount of arterial calcification. Monophasic waveforms are seen within   the external iliac and common femoral artery suggesting a pelvic inflow stenosis. Brisk monophasic waveforms to the level of the popliteal artery without an additional identified high-grade stenosis. The below-knee arteries are not evaluated due to open   wounds.    2.  LEFT LOWER EXTREMITY: Amputation.   MR Ankle Right w/o Contrast    Narrative    EXAM: MR ANKLE RIGHT W/O CONTRAST, MR FOOT RIGHT W/O CONTRAST  LOCATION: [facility]  DATE: 11/13/2024    INDICATION: Right foot and ankle infection.  COMPARISON: None available.  TECHNIQUE: Unenhanced.    FINDINGS:     Study degraded by motion, particularly patient pain. IV access additionally failed, precluding the administration of intravenous contrast.    There is a large ulceration at the posterior inferior aspect of the heel. This extends deep to abut the posteroinferior calcaneus, which demonstrates a shallow cortical erosion. Marrow edema extends anteriorly from this erosion into the mid calcaneus and   is compatible with acute osteomyelitis.    There is an additional ulceration along the lateral aspect of the forefoot, which extends deep to abut the fifth metatarsal base.. There is associated cortical erosion of the adjacent fifth metatarsal base, with diffuse marrow edema of the fifth   metatarsal compatible with acute osteomyelitis. Additional marrow edema within the cuboid, lateral cuneiform, and the  third and fourth metatarsal bases is also consistent with acute osteomyelitis. There is also the appearance of marrow edema within the   fifth toe, though this may be artifactual.    No evidence of osteonecrosis or abscess formation, within limitation of the noncontrast technique. There is some notable undermining associated with the distal ulceration at the lateral forefoot, which extends dorsally along the distal fifth metatarsal.    There is diffuse, superficial soft tissue swelling throughout the foot and ankle, which could be a chewable to a combination of bland edema and cellulitis.    No evidence of fracture, stress fracture, or avascular necrosis. No evidence of abscess.    Subacute on chronic denervation of the intrinsic foot musculature, though some degree of edema within the intrinsic musculature at the lateral forefoot/midfoot could be secondary to a superimposed myositis.    Foot and ankle tendons appear normal in course and caliber. Plantar fascia appears intact.      Impression    IMPRESSION:    Acute osteomyelitis of the posterior calcaneus, fifth metatarsal, third and fourth metatarsal bases, cuboid, and lateral cuneiform, as detailed above. No evidence of abscess or osteonecrosis.      MR Foot Right w/o Contrast    Narrative    EXAM: MR ANKLE RIGHT W/O CONTRAST, MR FOOT RIGHT W/O CONTRAST  LOCATION: [facility]  DATE: 11/13/2024    INDICATION: Right foot and ankle infection.  COMPARISON: None available.  TECHNIQUE: Unenhanced.    FINDINGS:     Study degraded by motion, particularly patient pain. IV access additionally failed, precluding the administration of intravenous contrast.    There is a large ulceration at the posterior inferior aspect of the heel. This extends deep to abut the posteroinferior calcaneus, which demonstrates a shallow cortical erosion. Marrow edema extends anteriorly from this erosion into the mid calcaneus and   is compatible with acute  osteomyelitis.    There is an additional ulceration along the lateral aspect of the forefoot, which extends deep to abut the fifth metatarsal base.. There is associated cortical erosion of the adjacent fifth metatarsal base, with diffuse marrow edema of the fifth   metatarsal compatible with acute osteomyelitis. Additional marrow edema within the cuboid, lateral cuneiform, and the third and fourth metatarsal bases is also consistent with acute osteomyelitis. There is also the appearance of marrow edema within the   fifth toe, though this may be artifactual.    No evidence of osteonecrosis or abscess formation, within limitation of the noncontrast technique. There is some notable undermining associated with the distal ulceration at the lateral forefoot, which extends dorsally along the distal fifth metatarsal.    There is diffuse, superficial soft tissue swelling throughout the foot and ankle, which could be a chewable to a combination of bland edema and cellulitis.    No evidence of fracture, stress fracture, or avascular necrosis. No evidence of abscess.    Subacute on chronic denervation of the intrinsic foot musculature, though some degree of edema within the intrinsic musculature at the lateral forefoot/midfoot could be secondary to a superimposed myositis.    Foot and ankle tendons appear normal in course and caliber. Plantar fascia appears intact.      Impression    IMPRESSION:    Acute osteomyelitis of the posterior calcaneus, fifth metatarsal, third and fourth metatarsal bases, cuboid, and lateral cuneiform, as detailed above. No evidence of abscess or osteonecrosis.      Echocardiogram Complete    Narrative    [id number]  [id number]  [id number]  [id number]^[place]ATYA^[place] [address]     Name: ALEXIA BURNS  MRN: [id number]  : 1945  Study Date: 2024 09:58 AM  Age: 79 yrs  Gender: Female  Patient Location: [facility]  Reason For Study: Atrial  Fibrillation  Ordering Physician: RANDAL ALDANA  Performed By: ARTHUR     BSA: 2.1 m2  Height: 66 in  Weight: 230 lb  HR: 74  BP: 103/58 mmHg  ______________________________________________________________________________  Procedure  Complete Portable Echo Adult. Definity (NDC #87866-313) given intravenously.  Technically difficult study.  ______________________________________________________________________________  Interpretation Summary     1. Normal left ventricular size and systolic performance with a visually  estimated ejection fraction of 60%.  2. No significant valvular heart disease is identified on this study.  3. Mild right ventricular enlargement with normal right ventricular systolic  performance.  4. There is mild biatrial enlargement.  ______________________________________________________________________________  Left ventricle:  Normal left ventricular size and systolic performance with a visually  estimated ejection fraction of 60%. There is normal regional wall motion.  There is borderline concentric increase in left ventricular wall thickness.     Assessment of LV Diastolic Function: Patient appears to be in atrial  fibrillation which limits assessment of diastolic filling.     Right ventricle:  Mild right ventricular enlargement with normal right ventricular systolic  performance.     Left atrium:  There is mild left atrial enlargement.     Right atrium:  There is mild right atrial enlargement.     IVC:  The IVC is borderline dilated.     Aortic valve:  The aortic valve is comprised of three cusps. No significant aortic stenosis  or aortic insufficiency is detected on this study.     Mitral valve:  The mitral valve appears morphologically normal. There is mild mitral  insufficiency.     Tricuspid valve:  The tricuspid valve is grossly morphologically normal. There is mild tricuspid  insufficiency.     Pulmonic valve:  The pulmonic valve is grossly morphologically normal.     Thoracic aorta:  The  aortic root and proximal ascending aorta are of normal dimension.     Pericardium:  There is no significant pericardial effusion.  ______________________________________________________________________________  ______________________________________________________________________________  MMode/2D Measurements & Calculations  IVSd: 1.2 cm  LVIDd: 4.6 cm  LVIDs: 3.4 cm  LVPWd: 1.2 cm  FS: 26.3 %  LV mass(C)d: 200.8 grams  LV mass(C)dI: 94.6 grams/m2  Ao root diam: 3.0 cm  asc Aorta Diam: 3.6 cm  LVOT diam: 2.0 cm  LVOT area: 3.2 cm2  Ao root diam index Ht(cm/m): 1.8  Ao root diam index BSA (cm/m2): 1.4  Asc Ao diam index BSA (cm/m2): 1.7  Asc Ao diam index Ht(cm/m): 2.2  RWT: 0.52     Doppler Measurements & Calculations  TR max ray: 213.1 cm/sec  TR max P.2 mmHg  RV S Ray: 13.5 cm/sec     ______________________________________________________________________________  Report approved by: Giovana Rodríguez 2024 11:04 AM

## 2024-11-13 NOTE — TELEPHONE ENCOUNTER
Surgery Scheduled      Surgery/Procedure: AMPUTATION, LOWER EXTREMITY, USING GUILLOTINE TECHNIQUE     Location: [facility]:  [address] [id number] (Phone: [phone], Fax: [phone])    Please enter through the main entrance. Check in at the Welcome Desk and you will be directed to the surgery unit.     Date: 11/14/2024    Time: 1230PM    Admission Type: Inpatient    Surgeon: Dr. Tierney Sanches    OR Confirmed & :  Yes with Susanna on 11/13/2024    Entered on provider calendar:  Yes    Post Op: see appt desk    Wound Vac Needed:  TBD    Home Care Needed:  TBD

## 2024-11-13 NOTE — PROGRESS NOTES
I reviewed the patient's foot and ankle MRI reports with the patient and her daughter today via phone: Multiple bones infected including the calcaneus, metatarsals 3, 4, 5, lateral cuneiform.  Based on the amount of bone infection present we discussed that salvage of the right foot would not be a viable option.  I recommend below the knee amputation at this time.  Patient and her daughter are agreeable to this recommendation.  Patient was also seen by vascular surgery this morning and discussed BKA.  Vascular surgery to plan next steps including likely guillotine amputation for source control followed by formal BKA.  Medical management per hospitalist.  Continue Zosyn/Vanco.  I will sign off at this time.  Please contact me with questions.

## 2024-11-14 ENCOUNTER — ANESTHESIA EVENT (OUTPATIENT)
Dept: SURGERY | Facility: CLINIC | Age: 79
End: 2024-11-14
Payer: COMMERCIAL

## 2024-11-14 ENCOUNTER — ANESTHESIA (OUTPATIENT)
Dept: SURGERY | Facility: CLINIC | Age: 79
End: 2024-11-14
Payer: COMMERCIAL

## 2024-11-14 LAB
ANION GAP SERPL CALCULATED.3IONS-SCNC: 9 MMOL/L (ref 7–15)
BACTERIA WND CULT: ABNORMAL
BACTERIA WND CULT: ABNORMAL
BUN SERPL-MCNC: 21.3 MG/DL (ref 8–23)
CALCIUM SERPL-MCNC: 9.4 MG/DL (ref 8.8–10.4)
CHLORIDE SERPL-SCNC: 100 MMOL/L (ref 98–107)
CREAT SERPL-MCNC: 0.86 MG/DL (ref 0.51–0.95)
EGFRCR SERPLBLD CKD-EPI 2021: 68 ML/MIN/1.73M2
ERYTHROCYTE [DISTWIDTH] IN BLOOD BY AUTOMATED COUNT: 16.5 % (ref 10–15)
GLUCOSE BLDC GLUCOMTR-MCNC: 101 MG/DL (ref 70–99)
GLUCOSE BLDC GLUCOMTR-MCNC: 116 MG/DL (ref 70–99)
GLUCOSE BLDC GLUCOMTR-MCNC: 127 MG/DL (ref 70–99)
GLUCOSE BLDC GLUCOMTR-MCNC: 138 MG/DL (ref 70–99)
GLUCOSE BLDC GLUCOMTR-MCNC: 181 MG/DL (ref 70–99)
GLUCOSE BLDC GLUCOMTR-MCNC: 212 MG/DL (ref 70–99)
GLUCOSE BLDC GLUCOMTR-MCNC: 86 MG/DL (ref 70–99)
GLUCOSE SERPL-MCNC: 116 MG/DL (ref 70–99)
HCO3 SERPL-SCNC: 30 MMOL/L (ref 22–29)
HCT VFR BLD AUTO: 29.4 % (ref 35–47)
HGB BLD-MCNC: 9 G/DL (ref 11.7–15.7)
MAGNESIUM SERPL-MCNC: 2.1 MG/DL (ref 1.7–2.3)
MCH RBC QN AUTO: 27.4 PG (ref 26.5–33)
MCHC RBC AUTO-ENTMCNC: 30.6 G/DL (ref 31.5–36.5)
MCV RBC AUTO: 90 FL (ref 78–100)
PHOSPHATE SERPL-MCNC: 2.6 MG/DL (ref 2.5–4.5)
PLATELET # BLD AUTO: 278 10E3/UL (ref 150–450)
POTASSIUM SERPL-SCNC: 3.6 MMOL/L (ref 3.4–5.3)
RBC # BLD AUTO: 3.28 10E6/UL (ref 3.8–5.2)
SODIUM SERPL-SCNC: 139 MMOL/L (ref 135–145)
WBC # BLD AUTO: 5.4 10E3/UL (ref 4–11)

## 2024-11-14 PROCEDURE — 370N000017 HC ANESTHESIA TECHNICAL FEE, PER MIN: Performed by: SURGERY

## 2024-11-14 PROCEDURE — 999N000141 HC STATISTIC PRE-PROCEDURE NURSING ASSESSMENT: Performed by: SURGERY

## 2024-11-14 PROCEDURE — 85041 AUTOMATED RBC COUNT: CPT

## 2024-11-14 PROCEDURE — 36415 COLL VENOUS BLD VENIPUNCTURE: CPT

## 2024-11-14 PROCEDURE — 83735 ASSAY OF MAGNESIUM: CPT

## 2024-11-14 PROCEDURE — 250N000011 HC RX IP 250 OP 636: Performed by: PHYSICIAN ASSISTANT

## 2024-11-14 PROCEDURE — 258N000003 HC RX IP 258 OP 636: Performed by: NURSE ANESTHETIST, CERTIFIED REGISTERED

## 2024-11-14 PROCEDURE — 85018 HEMOGLOBIN: CPT

## 2024-11-14 PROCEDURE — 250N000011 HC RX IP 250 OP 636: Performed by: NURSE ANESTHETIST, CERTIFIED REGISTERED

## 2024-11-14 PROCEDURE — 120N000001 HC R&B MED SURG/OB

## 2024-11-14 PROCEDURE — 250N000011 HC RX IP 250 OP 636

## 2024-11-14 PROCEDURE — 710N000010 HC RECOVERY PHASE 1, LEVEL 2, PER MIN: Performed by: SURGERY

## 2024-11-14 PROCEDURE — 88307 TISSUE EXAM BY PATHOLOGIST: CPT | Mod: TC | Performed by: SURGERY

## 2024-11-14 PROCEDURE — 250N000013 HC RX MED GY IP 250 OP 250 PS 637

## 2024-11-14 PROCEDURE — 360N000076 HC SURGERY LEVEL 3, PER MIN: Performed by: SURGERY

## 2024-11-14 PROCEDURE — 84100 ASSAY OF PHOSPHORUS: CPT

## 2024-11-14 PROCEDURE — 272N000001 HC OR GENERAL SUPPLY STERILE: Performed by: SURGERY

## 2024-11-14 PROCEDURE — 250N000025 HC SEVOFLURANE, PER MIN: Performed by: SURGERY

## 2024-11-14 PROCEDURE — 99207 PR NO CHARGE LOS: CPT | Performed by: INTERNAL MEDICINE

## 2024-11-14 PROCEDURE — 0Y6M0Z0 DETACHMENT AT RIGHT FOOT, COMPLETE, OPEN APPROACH: ICD-10-PCS | Performed by: SURGERY

## 2024-11-14 PROCEDURE — 250N000009 HC RX 250: Performed by: NURSE ANESTHETIST, CERTIFIED REGISTERED

## 2024-11-14 PROCEDURE — 258N000003 HC RX IP 258 OP 636

## 2024-11-14 PROCEDURE — 250N000011 HC RX IP 250 OP 636: Performed by: STUDENT IN AN ORGANIZED HEALTH CARE EDUCATION/TRAINING PROGRAM

## 2024-11-14 PROCEDURE — 250N000013 HC RX MED GY IP 250 OP 250 PS 637: Performed by: STUDENT IN AN ORGANIZED HEALTH CARE EDUCATION/TRAINING PROGRAM

## 2024-11-14 PROCEDURE — 250N000009 HC RX 250: Performed by: SURGERY

## 2024-11-14 PROCEDURE — 80048 BASIC METABOLIC PNL TOTAL CA: CPT

## 2024-11-14 PROCEDURE — 27882 AMPUTATION OF LOWER LEG: CPT | Mod: RT | Performed by: SURGERY

## 2024-11-14 PROCEDURE — 99232 SBSQ HOSP IP/OBS MODERATE 35: CPT | Mod: GC

## 2024-11-14 RX ORDER — ONDANSETRON 2 MG/ML
4 INJECTION INTRAMUSCULAR; INTRAVENOUS EVERY 30 MIN PRN
Status: DISCONTINUED | OUTPATIENT
Start: 2024-11-14 | End: 2024-11-14 | Stop reason: HOSPADM

## 2024-11-14 RX ORDER — OXYCODONE HYDROCHLORIDE 5 MG/1
10 TABLET ORAL
Status: DISCONTINUED | OUTPATIENT
Start: 2024-11-14 | End: 2024-11-14

## 2024-11-14 RX ORDER — ONDANSETRON 4 MG/1
4 TABLET, ORALLY DISINTEGRATING ORAL EVERY 30 MIN PRN
Status: DISCONTINUED | OUTPATIENT
Start: 2024-11-14 | End: 2024-11-14 | Stop reason: HOSPADM

## 2024-11-14 RX ORDER — HYDROMORPHONE HCL IN WATER/PF 6 MG/30 ML
0.2 PATIENT CONTROLLED ANALGESIA SYRINGE INTRAVENOUS EVERY 5 MIN PRN
Status: DISCONTINUED | OUTPATIENT
Start: 2024-11-14 | End: 2024-11-14 | Stop reason: HOSPADM

## 2024-11-14 RX ORDER — CEFAZOLIN SODIUM/WATER 2 G/20 ML
2 SYRINGE (ML) INTRAVENOUS
Status: COMPLETED | OUTPATIENT
Start: 2024-11-14 | End: 2024-11-14

## 2024-11-14 RX ORDER — LIDOCAINE HYDROCHLORIDE 10 MG/ML
INJECTION, SOLUTION INFILTRATION; PERINEURAL PRN
Status: DISCONTINUED | OUTPATIENT
Start: 2024-11-14 | End: 2024-11-14

## 2024-11-14 RX ORDER — FENTANYL CITRATE 50 UG/ML
INJECTION, SOLUTION INTRAMUSCULAR; INTRAVENOUS PRN
Status: DISCONTINUED | OUTPATIENT
Start: 2024-11-14 | End: 2024-11-14

## 2024-11-14 RX ORDER — NALOXONE HYDROCHLORIDE 0.4 MG/ML
0.1 INJECTION, SOLUTION INTRAMUSCULAR; INTRAVENOUS; SUBCUTANEOUS
Status: DISCONTINUED | OUTPATIENT
Start: 2024-11-14 | End: 2024-11-14 | Stop reason: HOSPADM

## 2024-11-14 RX ORDER — HYDROMORPHONE HYDROCHLORIDE 1 MG/ML
0.5 INJECTION, SOLUTION INTRAMUSCULAR; INTRAVENOUS; SUBCUTANEOUS
Status: DISCONTINUED | OUTPATIENT
Start: 2024-11-14 | End: 2024-11-15

## 2024-11-14 RX ORDER — METHADONE HYDROCHLORIDE 5 MG/1
5 TABLET ORAL ONCE
Status: COMPLETED | OUTPATIENT
Start: 2024-11-14 | End: 2024-11-14

## 2024-11-14 RX ORDER — ONDANSETRON 4 MG/1
4 TABLET, ORALLY DISINTEGRATING ORAL EVERY 30 MIN PRN
Status: DISCONTINUED | OUTPATIENT
Start: 2024-11-14 | End: 2024-11-14

## 2024-11-14 RX ORDER — KETAMINE HYDROCHLORIDE 10 MG/ML
INJECTION INTRAMUSCULAR; INTRAVENOUS PRN
Status: DISCONTINUED | OUTPATIENT
Start: 2024-11-14 | End: 2024-11-14

## 2024-11-14 RX ORDER — FENTANYL CITRATE 50 UG/ML
25 INJECTION, SOLUTION INTRAMUSCULAR; INTRAVENOUS EVERY 5 MIN PRN
Status: DISCONTINUED | OUTPATIENT
Start: 2024-11-14 | End: 2024-11-14 | Stop reason: HOSPADM

## 2024-11-14 RX ORDER — HYDROMORPHONE HCL IN WATER/PF 6 MG/30 ML
0.4 PATIENT CONTROLLED ANALGESIA SYRINGE INTRAVENOUS EVERY 5 MIN PRN
Status: DISCONTINUED | OUTPATIENT
Start: 2024-11-14 | End: 2024-11-14 | Stop reason: HOSPADM

## 2024-11-14 RX ORDER — NALOXONE HYDROCHLORIDE 0.4 MG/ML
0.1 INJECTION, SOLUTION INTRAMUSCULAR; INTRAVENOUS; SUBCUTANEOUS
Status: DISCONTINUED | OUTPATIENT
Start: 2024-11-14 | End: 2024-11-14

## 2024-11-14 RX ORDER — MAGNESIUM HYDROXIDE 1200 MG/15ML
LIQUID ORAL PRN
Status: DISCONTINUED | OUTPATIENT
Start: 2024-11-14 | End: 2024-11-14 | Stop reason: HOSPADM

## 2024-11-14 RX ORDER — GLYCOPYRROLATE 0.2 MG/ML
INJECTION, SOLUTION INTRAMUSCULAR; INTRAVENOUS PRN
Status: DISCONTINUED | OUTPATIENT
Start: 2024-11-14 | End: 2024-11-14

## 2024-11-14 RX ORDER — OXYCODONE HYDROCHLORIDE 5 MG/1
5 TABLET ORAL
Status: DISCONTINUED | OUTPATIENT
Start: 2024-11-14 | End: 2024-11-14

## 2024-11-14 RX ORDER — ONDANSETRON 2 MG/ML
4 INJECTION INTRAMUSCULAR; INTRAVENOUS EVERY 30 MIN PRN
Status: DISCONTINUED | OUTPATIENT
Start: 2024-11-14 | End: 2024-11-14

## 2024-11-14 RX ORDER — LORAZEPAM 2 MG/ML
.5-1 INJECTION INTRAMUSCULAR
Status: DISCONTINUED | OUTPATIENT
Start: 2024-11-14 | End: 2024-11-14 | Stop reason: HOSPADM

## 2024-11-14 RX ORDER — DEXAMETHASONE SODIUM PHOSPHATE 10 MG/ML
4 INJECTION, SOLUTION INTRAMUSCULAR; INTRAVENOUS
Status: DISCONTINUED | OUTPATIENT
Start: 2024-11-14 | End: 2024-11-14

## 2024-11-14 RX ORDER — FENTANYL CITRATE 50 UG/ML
50 INJECTION, SOLUTION INTRAMUSCULAR; INTRAVENOUS EVERY 5 MIN PRN
Status: DISCONTINUED | OUTPATIENT
Start: 2024-11-14 | End: 2024-11-14 | Stop reason: HOSPADM

## 2024-11-14 RX ORDER — KETOROLAC TROMETHAMINE 15 MG/ML
15 INJECTION, SOLUTION INTRAMUSCULAR; INTRAVENOUS ONCE
Status: DISCONTINUED | OUTPATIENT
Start: 2024-11-14 | End: 2024-11-14

## 2024-11-14 RX ORDER — CEFAZOLIN SODIUM/WATER 2 G/20 ML
2 SYRINGE (ML) INTRAVENOUS SEE ADMIN INSTRUCTIONS
Status: DISCONTINUED | OUTPATIENT
Start: 2024-11-14 | End: 2024-11-14 | Stop reason: HOSPADM

## 2024-11-14 RX ORDER — METHADONE HYDROCHLORIDE 5 MG/1
5 TABLET ORAL EVERY 8 HOURS
Status: DISCONTINUED | OUTPATIENT
Start: 2024-11-14 | End: 2024-11-14

## 2024-11-14 RX ORDER — PROPOFOL 10 MG/ML
INJECTION, EMULSION INTRAVENOUS PRN
Status: DISCONTINUED | OUTPATIENT
Start: 2024-11-14 | End: 2024-11-14

## 2024-11-14 RX ORDER — DEXAMETHASONE SODIUM PHOSPHATE 4 MG/ML
4 INJECTION, SOLUTION INTRA-ARTICULAR; INTRALESIONAL; INTRAMUSCULAR; INTRAVENOUS; SOFT TISSUE
Status: DISCONTINUED | OUTPATIENT
Start: 2024-11-14 | End: 2024-11-14 | Stop reason: HOSPADM

## 2024-11-14 RX ADMIN — HYDROMORPHONE HYDROCHLORIDE 1 MG: 1 INJECTION, SOLUTION INTRAMUSCULAR; INTRAVENOUS; SUBCUTANEOUS at 18:06

## 2024-11-14 RX ADMIN — INSULIN ASPART 1 UNITS: 100 INJECTION, SOLUTION INTRAVENOUS; SUBCUTANEOUS at 20:15

## 2024-11-14 RX ADMIN — METHADONE HYDROCHLORIDE 5 MG: 5 TABLET ORAL at 12:53

## 2024-11-14 RX ADMIN — FENTANYL CITRATE 50 MCG: 50 INJECTION INTRAMUSCULAR; INTRAVENOUS at 13:08

## 2024-11-14 RX ADMIN — ATORVASTATIN CALCIUM 40 MG: 40 TABLET, FILM COATED ORAL at 20:03

## 2024-11-14 RX ADMIN — Medication 2 G: at 13:12

## 2024-11-14 RX ADMIN — METHADONE HYDROCHLORIDE 5 MG: 5 TABLET ORAL at 20:02

## 2024-11-14 RX ADMIN — HYDROMORPHONE HYDROCHLORIDE 0.5 MG: 1 INJECTION, SOLUTION INTRAMUSCULAR; INTRAVENOUS; SUBCUTANEOUS at 16:09

## 2024-11-14 RX ADMIN — HYDROMORPHONE HYDROCHLORIDE 0.4 MG: 0.2 INJECTION, SOLUTION INTRAMUSCULAR; INTRAVENOUS; SUBCUTANEOUS at 14:41

## 2024-11-14 RX ADMIN — DEXMEDETOMIDINE HYDROCHLORIDE 20 MCG: 100 INJECTION, SOLUTION INTRAVENOUS at 14:21

## 2024-11-14 RX ADMIN — PROPOFOL 150 MG: 10 INJECTION, EMULSION INTRAVENOUS at 13:09

## 2024-11-14 RX ADMIN — METHADONE HYDROCHLORIDE 5 MG: 5 TABLET ORAL at 15:31

## 2024-11-14 RX ADMIN — DEXMEDETOMIDINE HYDROCHLORIDE 12 MCG: 100 INJECTION, SOLUTION INTRAVENOUS at 13:47

## 2024-11-14 RX ADMIN — POTASSIUM CHLORIDE 20 MEQ: 1500 TABLET, EXTENDED RELEASE ORAL at 20:03

## 2024-11-14 RX ADMIN — HYDROMORPHONE HYDROCHLORIDE 0.4 MG: 0.2 INJECTION, SOLUTION INTRAMUSCULAR; INTRAVENOUS; SUBCUTANEOUS at 14:56

## 2024-11-14 RX ADMIN — KETAMINE HYDROCHLORIDE 50 MG: 10 INJECTION INTRAMUSCULAR; INTRAVENOUS at 13:25

## 2024-11-14 RX ADMIN — HYDROMORPHONE HYDROCHLORIDE 0.4 MG: 0.2 INJECTION, SOLUTION INTRAMUSCULAR; INTRAVENOUS; SUBCUTANEOUS at 14:48

## 2024-11-14 RX ADMIN — VANCOMYCIN HYDROCHLORIDE 1750 MG: 5 INJECTION, POWDER, LYOPHILIZED, FOR SOLUTION INTRAVENOUS at 16:23

## 2024-11-14 RX ADMIN — FENTANYL CITRATE 50 MCG: 50 INJECTION INTRAMUSCULAR; INTRAVENOUS at 13:02

## 2024-11-14 RX ADMIN — PIPERACILLIN AND TAZOBACTAM 3.38 G: 3; .375 INJECTION, POWDER, FOR SOLUTION INTRAVENOUS at 00:32

## 2024-11-14 RX ADMIN — HYDROMORPHONE HYDROCHLORIDE 1 MG: 1 INJECTION, SOLUTION INTRAMUSCULAR; INTRAVENOUS; SUBCUTANEOUS at 20:02

## 2024-11-14 RX ADMIN — DEXMEDETOMIDINE HYDROCHLORIDE 20 MCG: 100 INJECTION, SOLUTION INTRAVENOUS at 14:35

## 2024-11-14 RX ADMIN — HYDROMORPHONE HYDROCHLORIDE 1 MG: 1 INJECTION, SOLUTION INTRAMUSCULAR; INTRAVENOUS; SUBCUTANEOUS at 14:19

## 2024-11-14 RX ADMIN — TORSEMIDE 60 MG: 20 TABLET ORAL at 08:44

## 2024-11-14 RX ADMIN — GLYCOPYRROLATE 0.1 MG: 0.2 INJECTION INTRAMUSCULAR; INTRAVENOUS at 13:25

## 2024-11-14 RX ADMIN — PIPERACILLIN AND TAZOBACTAM 3.38 G: 3; .375 INJECTION, POWDER, FOR SOLUTION INTRAVENOUS at 16:23

## 2024-11-14 RX ADMIN — PROPOFOL 50 MG: 10 INJECTION, EMULSION INTRAVENOUS at 13:17

## 2024-11-14 RX ADMIN — PIPERACILLIN AND TAZOBACTAM 3.38 G: 3; .375 INJECTION, POWDER, FOR SOLUTION INTRAVENOUS at 08:44

## 2024-11-14 RX ADMIN — POTASSIUM CHLORIDE 20 MEQ: 1500 TABLET, EXTENDED RELEASE ORAL at 08:44

## 2024-11-14 RX ADMIN — LIDOCAINE HYDROCHLORIDE 20 MG: 10 INJECTION, SOLUTION INFILTRATION; PERINEURAL at 13:09

## 2024-11-14 RX ADMIN — HYDROMORPHONE HYDROCHLORIDE 0.4 MG: 0.2 INJECTION, SOLUTION INTRAMUSCULAR; INTRAVENOUS; SUBCUTANEOUS at 14:36

## 2024-11-14 RX ADMIN — PROPOFOL 50 MG: 10 INJECTION, EMULSION INTRAVENOUS at 13:43

## 2024-11-14 RX ADMIN — HYDROMORPHONE HYDROCHLORIDE 0.4 MG: 0.2 INJECTION, SOLUTION INTRAMUSCULAR; INTRAVENOUS; SUBCUTANEOUS at 15:05

## 2024-11-14 RX ADMIN — METHADONE HYDROCHLORIDE 5 MG: 5 TABLET ORAL at 08:44

## 2024-11-14 ASSESSMENT — ACTIVITIES OF DAILY LIVING (ADL)
ADLS_ACUITY_SCORE: 0
DEPENDENT_IADLS:: CLEANING;COOKING;LAUNDRY;SHOPPING;MEAL PREPARATION;MEDICATION MANAGEMENT;MONEY MANAGEMENT;TRANSPORTATION
ADLS_ACUITY_SCORE: 0

## 2024-11-14 ASSESSMENT — ENCOUNTER SYMPTOMS: DYSRHYTHMIAS: 1

## 2024-11-14 NOTE — PROGRESS NOTES
[facility]    Progress Note and Post op check - Hospitalist Service       Date of Admission:  11/12/2024    Assessment & Plan   Linda Loredo is a 79 year old female admitted on 11/12/2024. She has a history of T2DM on Tresiba, paroxysmal A-fib, CHF, fibromyalgia, HLD, HTN, anemia of chronic disease, lymphedema, and is admitted for cellulitis of a diabetic foot ulcer. Anticipating OR for R BKA on 11/14     Cellulitis of diabetic foot ulcer with necrosis of the bone and underlying osteomyelitis  Pressure ulcer of the right heel, stage 4  Follows with wound care clinic regularly.Per Dr. Strong, patient was started on Vanc/Zosyn upon admission. MRI right foot and ankle showed osteomyelitis involving the calcaneus, metatarsals 3, 4, 5, and lateral cuneiform.  ABIs unable to be obtained due to wounds, however digital pressure suggests poor wound healing potential.Regarding pre-operative assessment: EKG with afib, stable since Jan 2024. Trop 38 -> 35, no concern for acute ischemia. Echo on 11/13 with EF 60%, no significant valvular disease, and mild biatrial enlargement.  -Podiatry, vascular surgery consults   - BKA by vascular scheduled for 11/14   - Podiatry signed off  -ID following  -Vanc/Zosyn IV (11/12- )  -Agree with planned amputation  -Final antibiotic plan to depend on cultures and extent of amputation  -Hold warfarin for OR today, will resume on 11/15/24 per surgery  -PTA methadone 5mg QID  -Tylenol PRN, Dilaudid 0.3-0.5mg PRN for breakthrough pain  -Blood cultures reveal providencia rettgeri, pseudomonas, staph, bacilli suspicious for diphtheroids, enterococcus faecalis     T2DM, uncontrolled  Diabetic neuropathy  CKD 3  Last A1c of 9.9 in  3/1/2024, 8.2 on admission. Patient follows with endo for management, last seen 04/2024. Follows with wound clinic regularly for right leg diabetic ulcers.   -Continue Tresiba 28 units every morning (PTA dose is 34 units)  -Medium  sliding scale insulin     Chronic pain syndrome  Reports she is taking methadone at home, however dispense report does not show any methadone dispensed since 07/2024.  -PTA methadone 5mg QID     HFpEF  Paroxysmal atrial fibrillation  Last visit with cardiology was at A-fib clinic on 9/16/2022, placed on warfarin given [id number] = 7 (>75, F, CHF, HTN, vasc dz, DM). INR 1.73 on admission. No rate control medications. CHF found in chart on recent admission in 07/28/24, no GDMT medications. Echo on 11/13 with EF 60%, no severe valvular disease, mild biatrial enlargement.  -Hold warfarin for OR today, per surgery's discretion on when to resume  -Continue PTA metolazone and torsemide     Chronic conditions:  Chronic normocytic anemia: at baseline of 9-10 on admission.  HTN: normotensive off antihypertensives  HLD: PTA statin  Lymphedema: PTA torsemide        Diet:      DVT Prophylaxis: Warfarin  Braga Catheter: Not present  Fluids: PO  Lines: None     Cardiac Monitoring: None  Code Status: Full Code      Disposition Plan     Expected Discharge Date: 11/15/2024      Destination: nursing home          The patient's care was discussed with the Attending Physician, Dr. Jimmy Ann .    Rose Cristina, DO  Hospitalist Service  [facility]  Securely message with [employer] (more info)  Text page via [employer] Paging/Directory   ______________________________________________________________________    Interval History   No acute events overnight. Daughter was driving in to see her. Is tearful but accepting of necessary procedure. Using tamiko to help her through this time.     Physical Exam   Vital Signs: Temp: 97.3  F (36.3  C) Temp src: Oral BP: 121/63 Pulse: 95   Resp: 20 SpO2: 95 % O2 Device: None (Room air)    Weight: 212 lbs 4.85 oz    POST-OP CHECK    Linda Billings Gertrude was seen post-operatively after Right BKA. Doing fine overall. Endorses pain at RLE that is not completely controlled on current  "pain medication regimen. Denies any shortness of breath, chest pain/discomfort, nausea, vomiting. All questions were answered.     /88   Pulse (!) 140   Temp 97.2  F (36.2  C) (Temporal)   Resp 27   Ht 1.676 m (5' 6\")   Wt 96.3 kg (212 lb 4.9 oz)   SpO2 94%   BMI 34.27 kg/m    GENERAL: No acute distress. Hopeful.   HEENT: No scleral icterus or conjunctival injection. Oral cavity moist and pink with no ulcers, exudate, or thrush present.   SKIN: Warm and dry. No bruises, rashes, or skin lesions.  LUNGS: Normal work of breathing with no use of accessory muscles. Clear breath sounds in all lung fields bilaterally with no wheezes or crackles appreciated.  CARDIAC: Regular rate and irregularly irregular rhythm, normal S1 and S2, no S3 or S4, and no murmur noted  ABDOMEN: Non-distended. Bowel sounds present in 4 quadrants. Soft and non-tender throughout with no masses or organomegaly.  EXTREMITIES: LLE and new RLE BKA, wrapped in coban.        Data     I have personally reviewed the following data over the past 24 hrs:    5.4  \   9.0 (L)   / 278     139 100 21.3 /  101 (H)   3.6 30 (H) 0.86 \       Imaging results reviewed over the past 24 hrs:   No results found for this or any previous visit (from the past 24 hours).    "

## 2024-11-14 NOTE — PROGRESS NOTES
Brief ID Follow-up Note    Chart reviewed. Not in room during rounds. Getting right leg guillotine amputation.   Culture reviewed, polymicrobial.    Continue vancomycin and pipercillin/tazobactam    Will follow    Jez Cage MD  [facility]  Direct messaging: [facility] Paging   On-Call ID provider: [phone], option: 9

## 2024-11-14 NOTE — ANESTHESIA POSTPROCEDURE EVALUATION
Patient: Linda Loredo    Procedure: Procedure(s):  AMPUTATION, LOWER EXTREMITY, USING GUILLOTINE TECHNIQUE       Anesthesia Type:  General    Note:     Postop Pain Control: Uneventful            Sign Out: Well controlled pain   PONV: No   Neuro/Psych: Uneventful            Sign Out: Acceptable/Baseline neuro status   Airway/Respiratory: Uneventful            Sign Out: Acceptable/Baseline resp. status   CV/Hemodynamics: Uneventful            Sign Out: Acceptable CV status; No obvious hypovolemia; No obvious fluid overload   Other NRE: NONE   DID A NON-ROUTINE EVENT OCCUR? No           Last vitals:  Vitals Value Taken Time   /54 11/14/24 1540   Temp 36  C (96.8  F) 11/14/24 1530   Pulse 97 11/14/24 1544   Resp 17 11/14/24 1540   SpO2 97 % 11/14/24 1544   Vitals shown include unfiled device data.    Electronically Signed By: Ziyad Khan MD  November 14, 2024  4:02 PM

## 2024-11-14 NOTE — OP NOTE
VASCULAR SURGERY OPERATIVE REPORT        LOCATION:    [facility]    Linda Loredo   Medical Record #:  [id number]  YOB: 1945  Age:  79 year old     Date of Service: November 14, 2024    PRIMARY CARE PROVIDER: Services, [facility]      Procedure: right below the knee amputation guillotine    Anesthesia:    General    Preprocedure diagnosis: Gangrene, right foot  Postprocedural diagnosis: Same    Surgeon: Tierney Sanches MD  Assistant: None    Findings: Difficult hyperemia involving all 4 compartments probably related to ongoing infection.  Significant mount of.  Material in posterior superficial compartment.    Estimated blood loss: 20 cc    Specimens: Right foot and ankle    Brief history: 79-year-old female who is scheduled for guillotine amputation of the right foot.  Patient was seen by podiatry service and considered to be not a good candidate for foot salvage.  Patient was scheduled for guillotine amputation.    Operative details:     The procedure was performed under general anesthesia.  The patient was placed supine.  The right lower extremity was prepped and draped in usual sterile fashion.  An occlusive dressing was applied to the top of the residual limp.  A tourniquet was applied to the thigh as well, and the cuff pressure was raised to 300 mmHg.  Then, using the Gigli saw, the right foot was amputated right above the ankle.  The anterior tibial artery and posterior tibial artery were suture-ligated using 0 silk stick ties.  Hemostasis was achieved using electrocautery.  Wet-to-dry dressing was applied.  Patient tolerated procedure well and was transferred to the PACU in stable condition    Tierney Sanches MD, [facility]  Vascular and Endovascular surgery

## 2024-11-14 NOTE — PLAN OF CARE
Goal Outcome Evaluation:       Patient alert and oriented x4. Room air.   Reported RLE pain 6/10. Declined pain.   Voiding. Purewick in place.   PIV patent. Flushed.   Bed rest. Wheelchair at baseline.  Dressing on right lower extremity weeping. Pt requested staff not to touch it.   1st CHG bath done.    VSS. Afebrile.   NPO.  Makes needs known. Call light within reach. Bed at lowest position. Bed alarm on.         HLM Admission: 1- Lying in Bed  HLM Daily1- Lying in bed          Problem: Skin or Soft Tissue Infection  Goal: Absence of Infection Signs and Symptoms  Outcome: Progressing     Problem: Pain Acute  Goal: Optimal Pain Control and Function  Outcome: Progressing  Intervention: Develop Pain Management Plan  Recent Flowsheet Documentation  Taken 11/14/2024 0046 by Janie Hardwick, RN  Pain Management Interventions: declines

## 2024-11-14 NOTE — ANESTHESIA PREPROCEDURE EVALUATION
Anesthesia Pre-Procedure Evaluation    Patient: Linda Loredo   MRN: [id number] : 1945        Procedure : Procedure(s):  IRRIGATION AND DEBRIDEMENT, LOWER EXTREMITY, RIGHT LOWER LEG          Past Medical History:   Diagnosis Date    Abscess of left foot 10/31/2022    Acute cystitis without hematuria 2024    Acute kidney injury (H) 2024    Adverse effect of anticoagulants, initial encounter 2024    GUSTAVO (acute kidney injury) (H) 2023    Allergic rhinitis 2008    Anemia 2024    Anticoagulation monitoring, INR range 2-3 2022    Anxiety 10/20/2011    Cardiac murmur, unspecified 2019    Cellulitis - bilateral lower extremities 2023    Cellulitis of buttock 2023    Chronic atrial fibrillation (H) 2022    New onset during 2022 admission      Chronic kidney disease, stage 3b (H) 2024    Chronic pain 2010    Chronic right-sided heart failure (H) 2023    Constipation 2024    Decubitus ulcers - 5 present on buttocks/perineal region, numerous scabbed over and unstagable on shin and bilateral feet with some bloody blisters noted on feet 2023    Depressive disorder, not elsewhere classified     Diabetic foot ulcer (H) 2023    Diabetic polyneuropathy associated with type 2 diabetes mellitus (H) 2006: on gabapentin.  She has been switched from gabapentin to lyrica.       Elevated liver enzymes 10/20/2011    Essential hypertension with goal blood pressure less than 140/90 2005    Fibromyalgia 10/20/2011    Fracture of fibula, distal, left, closed 10/20/2011    ORIF 10/13/11      Herpes zoster 2005: On gabapentin and lidoderm. She has been switched from gabapentin to lyrica.       Herpes zoster without mention of complication     Hx of osteomyelitis 2024    S.p left BKA       Hypercalcemia 2007    Hypoglycemia 2023    Hypotension  10/27/2011    Insomnia 07/15/2005    Lymphedema, not elsewhere classified 2024    Major depressive disorder, recurrent episode, moderate (H) 2008    Metabolic encephalopathy 2024    Microalbuminuria due to type 2 diabetes mellitus (H) 10/25/2016    Mild intermittent asthma     Mixed hyperlipidemia 2005    Mixed hyperlipidemia due to type 2 diabetes mellitus (H) 2008    Formatting of this note is different from the original.     22 ASCVD 10 year risk: 37.9%      Last Lipids:  Last Lipids:  Chol: 2021 130   63  HDL: 2021 46  Non-HDL: 2021 84  Chol/HDL Ratio: 2021 2.83  LDL DIRECT: . No results found in past 5 years   LDL CHOLESTEROL (mg/dL)   Date Value   2021 71      22   The 10-year ASCVD risk score (Teddy SMITH Jr.    Mixed stress and urge urinary incontinence 2005: On Detrol LA.      Non-healing wound of left heel 2023    Obesity with BMI >35 and comorbidity 2006: Body mass index is 48.07 kg/(m^2).       Opioid dependence, uncomplicated (H) 2023    BERLIN (obstructive sleep apnea) 10/23/2007    Sleep study 2004 reportedly showing severe OBSTRUCTIVE SLEEP APNEA and recommend CPAP, Not available for review. Patient is untreated       Osteoarthritis 2006    Osteomyelitis (H) 10/24/2023    Osteopenia 2015    Other abnormalities of gait and mobility 11/15/2023    Other urinary incontinence     Pressure injury of deep tissue of sacral region 2024    Primary hyperparathyroidism (H) 2013    Work up 2013 Dr. 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       Supratherapeutic INR 05/27/2023    Type 2 diabetes mellitus with complication, with long-term current use of insulin (H) 04/18/2005    diagnosed in 2001       Type II or unspecified type diabetes mellitus with renal manifestations, uncontrolled(250.42) (H)     Type II or unspecified type diabetes mellitus without mention of complication, not stated as uncontrolled     Umbilical hernia without obstruction and without gangrene 12/13/2018    Unspecified essential hypertension     Unspecified open wound, left foot, subsequent encounter 09/22/2023    UTI (urinary tract infection) - possible 05/26/2023    Venous stasis 04/16/2024    Vitamin D deficiency 09/08/2022    Warfarin-induced coagulopathy (H) 05/27/2023      Past Surgical History:   Procedure Laterality Date    AMPUTATE LEG BELOW KNEE Left 10/7/2023    Procedure: LEFT GUILLOTINE BELOW KNEE AMPUTATION.;  Surgeon: Lan Otto MD;  Location:  OR    AMPUTATE LEG BELOW KNEE Left 10/14/2023    Procedure: debridement of left below the knee amputation;  Surgeon: Lan Otto MD;  Location:  OR    APPLY WOUND VAC Left 1/2/2024    Procedure: WOUND VAC APPLICATION TO LEFT BELOW KNEE STUMP;  Surgeon: Lan Otto MD;  Location:  OR    CHOLECYSTECTOMY      GRAFT SKIN SPLIT THICKNESS FROM TRUNK TO HEAD Left 1/2/2024    Procedure: APPLICATION OF SPLIT-THICKNESS SKIN GRAFT TO LEFT BELOW KNEE STUMP, GRAFT FROM LEFT THIGH;  Surgeon: Lan Otto MD;  Location:  OR    INCISION AND DRAINAGE FOOT, COMBINED Left 9/26/2023    Procedure: Surgical debridement of all nonviable skin and soft tissue and bone left foot;  Surgeon: Nolberto Thorne DPM;  Location: [facility] OR    IR LOWER EXTREMITY ANGIOGRAM LEFT   10/3/2023    IRRIGATION AND DEBRIDEMENT LOWER EXTREMITY, COMBINED Right 7/14/2024    Procedure: IRRIGATION AND DEBRIDEMENT, LOWER EXTREMITY, RIGHT LOWER LEG;  Surgeon: Phuong Gutierrez MD;  Location: [facility] OR    ligation of fallopian tube      OPEN REDUCTION INTERNAL FIXATION ANKLE  10/13/11    left    pinning of left 2nd toe        Allergies   Allergen Reactions    Prednisone Nausea and Vomiting    Morphine Nausea and Vomiting    Morphine [Fumaric Acid] Nausea and Vomiting    Nitrofurantoin Unknown     Per nursing home      Social History     Tobacco Use    Smoking status: Never    Smokeless tobacco: Never   Substance Use Topics    Alcohol use: No      Wt Readings from Last 1 Encounters:   11/14/24 96.3 kg (212 lb 4.9 oz)        Anesthesia Evaluation   Pt has had prior anesthetic.         ROS/MED HX  ENT/Pulmonary:     (+) sleep apnea,          allergic rhinitis,           asthma                  Neurologic: Comment: Metabolic encephalopathy       Cardiovascular:     (+) Dyslipidemia hypertension- Peripheral Vascular Disease-   -  - -   Taking blood thinners   CHF (Right sided)  Last EF: 60 date: 05/2022               dysrhythmias, a-fib,  valvular problems/murmurs type: MR TR.   pulmonary hypertension, Previous cardiac testing   Echo: Date: 05/31/2022 Results:  Echo 11/2024  1. Normal left ventricular size and systolic performance with a visually  estimated ejection fraction of 60%.  2. No significant valvular heart disease is identified on this study.  3. Mild right ventricular enlargement with normal right ventricular systolic  performance.  4. There is mild biatrial enlargement.       Stress Test:  Date: Results:    ECG Reviewed:  Date: 07/11/2024 Results:  Atrial fibrillation   -Inferior infarct -age undetermined -Old anterior infarct.    ABNORMAL  Cath:  Date: Results:      METS/Exercise Tolerance:     Hematologic: Comments: INR 1.7    (+)      anemia,          Musculoskeletal: Comment: Osteoarthritis  Left  BKA  Pressure ulcers  (+)  arthritis,             GI/Hepatic:       Renal/Genitourinary: Comment: Mixed stress and urge urinary incontinence   Microalbinuria      (+) renal disease, type: ARF and CRI,            Endo: Comment: hyperparathyroidism    (+)  type II DM, Last HgA1c: 9.9, date: 03/2024, Using insulin,          Obesity,    Type I DM: 9.9.   Psychiatric/Substance Use:     (+) psychiatric history depression and anxiety  H/O chronic opiod use .     Infectious Disease: Comment: R foot infection, MRSA    (+)   MRSA (09/23/23),         Malignancy:       Other: Comment: Lymphedema     (+)  , H/O Chronic Pain (fibromyalgia),         Physical Exam    Airway  airway exam normal      Mallampati: II   TM distance: > 3 FB   Neck ROM: full   Mouth opening: > 3 cm    Respiratory Devices and Support         Dental       (+) Multiple visibly decayed, broken teeth      Cardiovascular   cardiovascular exam normal          Pulmonary   pulmonary exam normal        breath sounds clear to auscultation         OUTSIDE LABS:  CBC:   Lab Results   Component Value Date    WBC 5.4 11/14/2024    WBC 7.5 11/13/2024    HGB 9.0 (L) 11/14/2024    HGB 8.8 (L) 11/13/2024    HCT 29.4 (L) 11/14/2024    HCT 28.6 (L) 11/13/2024     11/14/2024     11/13/2024     BMP:   Lab Results   Component Value Date     11/14/2024     11/13/2024    POTASSIUM 3.6 11/14/2024    POTASSIUM 4.0 11/13/2024    CHLORIDE 100 11/14/2024    CHLORIDE 98 11/13/2024    CO2 30 (H) 11/14/2024    CO2 33 (H) 11/13/2024    BUN 21.3 11/14/2024    BUN 23.3 (H) 11/13/2024    CR 0.86 11/14/2024    CR 0.89 11/13/2024     (H) 11/14/2024     (H) 11/14/2024     COAGS:   Lab Results   Component Value Date    PTT 23 06/16/2006    INR 1.73 (H) 11/13/2024     POC:   Lab Results   Component Value Date     (H) 04/27/2006     HEPATIC:   Lab Results   Component Value Date    ALBUMIN 2.5 (L) 07/29/2024    PROTTOTAL 7.0 07/29/2024    ALT 16  07/29/2024    AST 16 07/29/2024    ALKPHOS 94 07/29/2024    BILITOTAL 0.3 07/29/2024     OTHER:   Lab Results   Component Value Date    LACT 1.5 11/12/2024    A1C 8.2 (H) 11/12/2024    ZAKIA 9.4 11/14/2024    PHOS 2.6 11/14/2024    MAG 2.1 11/14/2024    TSH 3.46 11/24/2023    T4 1.20 11/24/2023    CRP 16.0 (H) 03/28/2008    SED 91 (H) 07/28/2024       Anesthesia Plan    ASA Status:  3    NPO Status:  NPO Appropriate    Anesthesia Type: General.     - Airway: LMA   Induction: Intravenous.   Maintenance: TIVA.        Consents    Anesthesia Plan(s) and associated risks, benefits, and realistic alternatives discussed. Questions answered and patient/representative(s) expressed understanding.     - Discussed: Risks, Benefits and Alternatives for BOTH SEDATION and the PROCEDURE were discussed     - Discussed with:  Patient            Postoperative Care       PONV prophylaxis: Ondansetron (or other 5HT-3), Dexamethasone or Solumedrol     Comments:    Other Comments: No nerve block given INR 1.7    Methadone patient. Takes 5mg 4x per day. Will take 1pm dose now.     Multimodal pain management   .25mcg/kg precedex, 50mg of ketamine at incision, dilaudid for pain               Ziyad Khan MD    I have reviewed the pertinent notes and labs in the chart from the past 30 days and (re)examined the patient.  Any updates or changes from those notes are reflected in this note.

## 2024-11-14 NOTE — PROGRESS NOTES
Resident team at bedside w/ Dr Khan. Pt requesting to go upstairs to see daughter. Plan to give pts 1600 5mg methadone dose in PACU and transfer to floor. Pt and team is in agreement with this plan.

## 2024-11-14 NOTE — ANESTHESIA CARE TRANSFER NOTE
Patient: Linda Loredo    Procedure: Procedure(s):  AMPUTATION, LOWER EXTREMITY, USING GUILLOTINE TECHNIQUE       Diagnosis: Pressure ulcer of right heel, stage 4 (H) [L89.614]  Diagnosis Additional Information: No value filed.    Anesthesia Type:   General     Note:    Oropharynx: oropharynx clear of all foreign objects  Level of Consciousness: awake  Oxygen Supplementation: face mask  Level of Supplemental Oxygen (L/min / FiO2): 6  Independent Airway: airway patency satisfactory and stable  Dentition: dentition unchanged  Vital Signs Stable: post-procedure vital signs reviewed and stable  Report to RN Given: handoff report given  Patient transferred to: PACU    Handoff Report: Identifed the Patient, Identified the Reponsible Provider, Reviewed the pertinent medical history, Discussed the surgical course, Reviewed Intra-OP anesthesia mangement and issues during anesthesia, Set expectations for post-procedure period and Allowed opportunity for questions and acknowledgement of understanding      Vitals:  Vitals Value Taken Time   /89 11/14/24 1416   Temp 35.7  C (96.26  F) 11/14/24 1420   Pulse 140 11/14/24 1420   Resp 27 11/14/24 1420   SpO2 94 % 11/14/24 1420   Vitals shown include unfiled device data.    Electronically Signed By: MOE Sim CRNA  November 14, 2024  2:21 PM

## 2024-11-14 NOTE — PLAN OF CARE
Goal Outcome Evaluation:      Plan of Care Reviewed With: patient          Outcome Evaluation: Anticipate DC back to [facility].

## 2024-11-14 NOTE — CONSULTS
Care Management Initial Consult    General Information  Assessment completed with: Patient,    Type of CM/SW Visit: Initial Assessment    Primary Care Provider verified and updated as needed: Yes   Reason for Consult: discharge planning       Communication Assessment  Patient's communication style: spoken language (English or Bilingual)    Hearing Difficulty or Deaf: no   Wear Glasses or Blind: yes    Cognitive  Cognitive/Neuro/Behavioral: WDL                      Living Environment:   People in home: facility resident     Current living Arrangements: other (see comments) (From [facility])  Name of Facility: [facility]        Family/Social Support:  Care provided by: self, other (see comments) (staff at [facility])  Provides care for: no one, unable/limited ability to care for self     Support system: Children          Description of Support System: Supportive, Involved         Current Resources:   Patient receiving home care services: No        Community Resources: Transitional Care  Equipment currently used at home: walker, standard  Supplies currently used at home: Diabetic Supplies         Does the patient's insurance plan have a 3 day qualifying hospital stay waiver?  No    Lifestyle & Psychosocial Needs:  Social Drivers of Health     Food Insecurity: Low Risk  (11/13/2024)    Food Insecurity     Within the past 12 months, did you worry that your food would run out before you got money to buy more?: No     Within the past 12 months, did the food you bought just not last and you didn t have money to get more?: No   Depression: Not at risk (9/12/2023)    [facility]    PHQ-2     PHQ-2 TOTAL SCORE: 1   Housing Stability: Low Risk  (11/13/2024)    Housing Stability     Do you have housing? : Yes     Are you worried about losing your housing?: No   Tobacco Use: Low Risk  (11/12/2024)    Patient History     Smoking  Tobacco Use: Never     Smokeless Tobacco Use: Never     Passive Exposure: Not on file   Financial Resource Strain: Low Risk  (11/13/2024)    Financial Resource Strain     Within the past 12 months, have you or your family members you live with been unable to get utilities (heat, electricity) when it was really needed?: No   Alcohol Use: Not on file   Transportation Needs: Low Risk  (11/13/2024)    Transportation Needs     Within the past 12 months, has lack of transportation kept you from medical appointments, getting your medicines, non-medical meetings or appointments, work, or from getting things that you need?: No   Physical Activity: Not on file   Interpersonal Safety: Low Risk  (11/13/2024)    Interpersonal Safety     Do you feel physically and emotionally safe where you currently live?: Yes     Within the past 12 months, have you been hit, slapped, kicked or otherwise physically hurt by someone?: No     Within the past 12 months, have you been humiliated or emotionally abused in other ways by your partner or ex-partner?: No   Stress: Not on file   Social Connections: Unknown (3/9/2023)    Received from [facility], [facility]    Social Connections     Frequency of Communication with Friends and Family: Not on file   Health Literacy: Not on file       Functional Status:  Prior to admission patient needed assistance:   Dependent ADLs:: Bathing, Dressing, Wheelchair-with assist, Toileting  Dependent IADLs:: Cleaning, Cooking, Laundry, Shopping, Meal Preparation, Medication Management, Money Management, Transportation       Mental Health Status:  Mental Health Status: No Current Concerns       Chemical Dependency Status:  Chemical Dependency Status: No Current Concerns               Additional Information:  12:00 PM  Assessment: Follow   Last Note:11/14/24  Plan: Assessed.  From [facility] (has bed hold).    Needs: Anticipate return to  [facility].  No PAS needed  Hand off sent: No  Transport: Medical:  Wheelchair (pt's WC is here)    Pt was living at [facility] in [place] prior to TCU admit.  Bed was given up.    JAVIER Funes

## 2024-11-14 NOTE — PROVIDER NOTIFICATION
Dr. Sanches aware of INR level and last coumadin dose on Monday. No need to redraw level before surgery per MD. No need to draw other labs (CBC, A1C, lipid panel) pre-op since patient had labs this morning and will stay inpatient.

## 2024-11-14 NOTE — PLAN OF CARE
Problem: Skin or Soft Tissue Infection  Goal: Absence of Infection Signs and Symptoms  11/13/2024 2329 by Rodriguez Dewitt, RN  Outcome: Not Progressing     Problem: Pain Acute  Goal: Optimal Pain Control and Function  Intervention: Develop Pain Management Plan  Recent Flowsheet Documentation  Taken 11/13/2024 2155 by Rodriguez Dewitt, RN  Pain Management Interventions: medication (see MAR)     Goal Outcome Evaluation:       Pt complains of pain to RLE. Methadone given. Incontinent of urine, pure wick in place. Brief changed. Glucose was 196, insulin coverage used. NPO at 0000 for surgery tomorrow. Contact precautions in place.

## 2024-11-15 LAB
ALBUMIN SERPL BCG-MCNC: 2.8 G/DL (ref 3.5–5.2)
ANION GAP SERPL CALCULATED.3IONS-SCNC: 10 MMOL/L (ref 7–15)
BUN SERPL-MCNC: 16.8 MG/DL (ref 8–23)
CALCIUM SERPL-MCNC: 8.8 MG/DL (ref 8.8–10.4)
CHLORIDE SERPL-SCNC: 97 MMOL/L (ref 98–107)
CREAT SERPL-MCNC: 0.87 MG/DL (ref 0.51–0.95)
EGFRCR SERPLBLD CKD-EPI 2021: 67 ML/MIN/1.73M2
ERYTHROCYTE [DISTWIDTH] IN BLOOD BY AUTOMATED COUNT: 16.6 % (ref 10–15)
GLUCOSE BLDC GLUCOMTR-MCNC: 132 MG/DL (ref 70–99)
GLUCOSE BLDC GLUCOMTR-MCNC: 151 MG/DL (ref 70–99)
GLUCOSE BLDC GLUCOMTR-MCNC: 165 MG/DL (ref 70–99)
GLUCOSE BLDC GLUCOMTR-MCNC: 176 MG/DL (ref 70–99)
GLUCOSE BLDC GLUCOMTR-MCNC: 206 MG/DL (ref 70–99)
GLUCOSE BLDC GLUCOMTR-MCNC: 302 MG/DL (ref 70–99)
GLUCOSE SERPL-MCNC: 132 MG/DL (ref 70–99)
HCO3 SERPL-SCNC: 29 MMOL/L (ref 22–29)
HCT VFR BLD AUTO: 28.8 % (ref 35–47)
HGB BLD-MCNC: 8.9 G/DL (ref 11.7–15.7)
MAGNESIUM SERPL-MCNC: 2 MG/DL (ref 1.7–2.3)
MCH RBC QN AUTO: 28 PG (ref 26.5–33)
MCHC RBC AUTO-ENTMCNC: 30.9 G/DL (ref 31.5–36.5)
MCV RBC AUTO: 91 FL (ref 78–100)
PHOSPHATE SERPL-MCNC: 2.7 MG/DL (ref 2.5–4.5)
PLATELET # BLD AUTO: 299 10E3/UL (ref 150–450)
POTASSIUM SERPL-SCNC: 4 MMOL/L (ref 3.4–5.3)
RBC # BLD AUTO: 3.18 10E6/UL (ref 3.8–5.2)
SODIUM SERPL-SCNC: 136 MMOL/L (ref 135–145)
VANCOMYCIN SERPL-MCNC: 22.3 UG/ML
WBC # BLD AUTO: 5.9 10E3/UL (ref 4–11)

## 2024-11-15 PROCEDURE — 250N000011 HC RX IP 250 OP 636: Performed by: FAMILY MEDICINE

## 2024-11-15 PROCEDURE — 97606 NEG PRS WND THER DME>50 SQCM: CPT

## 2024-11-15 PROCEDURE — 85027 COMPLETE CBC AUTOMATED: CPT

## 2024-11-15 PROCEDURE — 250N000013 HC RX MED GY IP 250 OP 250 PS 637

## 2024-11-15 PROCEDURE — 250N000013 HC RX MED GY IP 250 OP 250 PS 637: Performed by: PAIN MEDICINE

## 2024-11-15 PROCEDURE — 250N000011 HC RX IP 250 OP 636

## 2024-11-15 PROCEDURE — 250N000011 HC RX IP 250 OP 636: Performed by: INTERNAL MEDICINE

## 2024-11-15 PROCEDURE — 80202 ASSAY OF VANCOMYCIN: CPT | Performed by: FAMILY MEDICINE

## 2024-11-15 PROCEDURE — 84100 ASSAY OF PHOSPHORUS: CPT

## 2024-11-15 PROCEDURE — G0463 HOSPITAL OUTPT CLINIC VISIT: HCPCS | Mod: 25

## 2024-11-15 PROCEDURE — 83735 ASSAY OF MAGNESIUM: CPT

## 2024-11-15 PROCEDURE — 99232 SBSQ HOSP IP/OBS MODERATE 35: CPT | Performed by: INTERNAL MEDICINE

## 2024-11-15 PROCEDURE — 99223 1ST HOSP IP/OBS HIGH 75: CPT | Performed by: PAIN MEDICINE

## 2024-11-15 PROCEDURE — 120N000001 HC R&B MED SURG/OB

## 2024-11-15 PROCEDURE — 36415 COLL VENOUS BLD VENIPUNCTURE: CPT

## 2024-11-15 PROCEDURE — 82374 ASSAY BLOOD CARBON DIOXIDE: CPT

## 2024-11-15 PROCEDURE — 250N000013 HC RX MED GY IP 250 OP 250 PS 637: Performed by: INTERNAL MEDICINE

## 2024-11-15 PROCEDURE — 99232 SBSQ HOSP IP/OBS MODERATE 35: CPT | Mod: GC

## 2024-11-15 PROCEDURE — 80048 BASIC METABOLIC PNL TOTAL CA: CPT

## 2024-11-15 PROCEDURE — F08G5YZ WOUND MANAGEMENT TREATMENT OF INTEGUMENTARY SYSTEM - LOWER BACK / LOWER EXTREMITY USING OTHER EQUIPMENT: ICD-10-PCS | Performed by: FAMILY MEDICINE

## 2024-11-15 RX ORDER — METRONIDAZOLE 500 MG/1
500 TABLET ORAL 3 TIMES DAILY
Status: DISCONTINUED | OUTPATIENT
Start: 2024-11-15 | End: 2024-11-18 | Stop reason: HOSPADM

## 2024-11-15 RX ORDER — SENNOSIDES 8.6 MG
2 TABLET ORAL 2 TIMES DAILY
Status: DISCONTINUED | OUTPATIENT
Start: 2024-11-15 | End: 2024-11-18 | Stop reason: HOSPADM

## 2024-11-15 RX ORDER — HYDROMORPHONE HYDROCHLORIDE 2 MG/1
2-4 TABLET ORAL
Status: DISCONTINUED | OUTPATIENT
Start: 2024-11-15 | End: 2024-11-18 | Stop reason: HOSPADM

## 2024-11-15 RX ORDER — ACETAMINOPHEN 325 MG/1
975 TABLET ORAL 3 TIMES DAILY
Status: DISCONTINUED | OUTPATIENT
Start: 2024-11-15 | End: 2024-11-18 | Stop reason: HOSPADM

## 2024-11-15 RX ORDER — HYDROMORPHONE HYDROCHLORIDE 1 MG/ML
0.5 INJECTION, SOLUTION INTRAMUSCULAR; INTRAVENOUS; SUBCUTANEOUS
Status: DISCONTINUED | OUTPATIENT
Start: 2024-11-15 | End: 2024-11-15

## 2024-11-15 RX ORDER — CEFEPIME HYDROCHLORIDE 2 G/1
2 INJECTION, POWDER, FOR SOLUTION INTRAVENOUS EVERY 8 HOURS
Status: DISCONTINUED | OUTPATIENT
Start: 2024-11-15 | End: 2024-11-18 | Stop reason: HOSPADM

## 2024-11-15 RX ORDER — KETAMINE HYDROCHLORIDE 10 MG/ML
15 INJECTION INTRAMUSCULAR; INTRAVENOUS DAILY PRN
Status: DISCONTINUED | OUTPATIENT
Start: 2024-11-15 | End: 2024-11-18 | Stop reason: HOSPADM

## 2024-11-15 RX ORDER — PREGABALIN 25 MG/1
25 CAPSULE ORAL AT BEDTIME
Status: DISCONTINUED | OUTPATIENT
Start: 2024-11-15 | End: 2024-11-18 | Stop reason: HOSPADM

## 2024-11-15 RX ORDER — HYDROMORPHONE HYDROCHLORIDE 2 MG/1
2 TABLET ORAL
Status: COMPLETED | OUTPATIENT
Start: 2024-11-15 | End: 2024-11-15

## 2024-11-15 RX ORDER — HYDROMORPHONE HYDROCHLORIDE 2 MG/1
2 TABLET ORAL
Status: DISCONTINUED | OUTPATIENT
Start: 2024-11-15 | End: 2024-11-15

## 2024-11-15 RX ORDER — HYDROMORPHONE HYDROCHLORIDE 1 MG/ML
0.3 INJECTION, SOLUTION INTRAMUSCULAR; INTRAVENOUS; SUBCUTANEOUS
Status: DISCONTINUED | OUTPATIENT
Start: 2024-11-15 | End: 2024-11-16

## 2024-11-15 RX ADMIN — HYDROMORPHONE HYDROCHLORIDE 1 MG: 1 INJECTION, SOLUTION INTRAMUSCULAR; INTRAVENOUS; SUBCUTANEOUS at 00:04

## 2024-11-15 RX ADMIN — CEFEPIME 2 G: 2 INJECTION, POWDER, FOR SOLUTION INTRAVENOUS at 17:27

## 2024-11-15 RX ADMIN — PIPERACILLIN AND TAZOBACTAM 3.38 G: 3; .375 INJECTION, POWDER, FOR SOLUTION INTRAVENOUS at 00:07

## 2024-11-15 RX ADMIN — METRONIDAZOLE 500 MG: 500 TABLET, FILM COATED ORAL at 13:18

## 2024-11-15 RX ADMIN — METRONIDAZOLE 500 MG: 500 TABLET, FILM COATED ORAL at 11:37

## 2024-11-15 RX ADMIN — HYDROMORPHONE HYDROCHLORIDE 2 MG: 2 TABLET ORAL at 05:25

## 2024-11-15 RX ADMIN — PIPERACILLIN AND TAZOBACTAM 3.38 G: 3; .375 INJECTION, POWDER, FOR SOLUTION INTRAVENOUS at 08:28

## 2024-11-15 RX ADMIN — PREGABALIN 25 MG: 25 CAPSULE ORAL at 21:29

## 2024-11-15 RX ADMIN — METHADONE HYDROCHLORIDE 5 MG: 5 TABLET ORAL at 16:34

## 2024-11-15 RX ADMIN — HYDROMORPHONE HYDROCHLORIDE 2 MG: 1 INJECTION, SOLUTION INTRAMUSCULAR; INTRAVENOUS; SUBCUTANEOUS at 13:18

## 2024-11-15 RX ADMIN — INSULIN ASPART 1 UNITS: 100 INJECTION, SOLUTION INTRAVENOUS; SUBCUTANEOUS at 05:04

## 2024-11-15 RX ADMIN — METRONIDAZOLE 500 MG: 500 TABLET, FILM COATED ORAL at 21:28

## 2024-11-15 RX ADMIN — Medication 1250 MG: at 16:37

## 2024-11-15 RX ADMIN — HYDROMORPHONE HYDROCHLORIDE 2 MG: 2 TABLET ORAL at 01:50

## 2024-11-15 RX ADMIN — INSULIN ASPART 1 UNITS: 100 INJECTION, SOLUTION INTRAVENOUS; SUBCUTANEOUS at 01:23

## 2024-11-15 RX ADMIN — ACETAMINOPHEN 975 MG: 325 TABLET ORAL at 21:25

## 2024-11-15 RX ADMIN — HYDROMORPHONE HYDROCHLORIDE 2 MG: 2 TABLET ORAL at 11:37

## 2024-11-15 RX ADMIN — ACETAMINOPHEN 975 MG: 325 TABLET ORAL at 16:34

## 2024-11-15 RX ADMIN — METHADONE HYDROCHLORIDE 5 MG: 5 TABLET ORAL at 21:30

## 2024-11-15 RX ADMIN — SENNOSIDES AND DOCUSATE SODIUM 2 TABLET: 50; 8.6 TABLET ORAL at 05:26

## 2024-11-15 RX ADMIN — METHADONE HYDROCHLORIDE 5 MG: 5 TABLET ORAL at 11:37

## 2024-11-15 RX ADMIN — SENNOSIDES 2 TABLET: 8.6 TABLET, FILM COATED ORAL at 21:29

## 2024-11-15 RX ADMIN — METHADONE HYDROCHLORIDE 5 MG: 5 TABLET ORAL at 08:28

## 2024-11-15 RX ADMIN — POTASSIUM CHLORIDE 20 MEQ: 1500 TABLET, EXTENDED RELEASE ORAL at 08:28

## 2024-11-15 RX ADMIN — APIXABAN 5 MG: 5 TABLET, FILM COATED ORAL at 21:30

## 2024-11-15 RX ADMIN — ATORVASTATIN CALCIUM 40 MG: 40 TABLET, FILM COATED ORAL at 21:30

## 2024-11-15 RX ADMIN — TORSEMIDE 60 MG: 20 TABLET ORAL at 08:28

## 2024-11-15 RX ADMIN — POTASSIUM CHLORIDE 20 MEQ: 1500 TABLET, EXTENDED RELEASE ORAL at 21:27

## 2024-11-15 RX ADMIN — HYDROMORPHONE HYDROCHLORIDE 2 MG: 2 TABLET ORAL at 08:28

## 2024-11-15 RX ADMIN — HYDROMORPHONE HYDROCHLORIDE 1 MG: 1 INJECTION, SOLUTION INTRAMUSCULAR; INTRAVENOUS; SUBCUTANEOUS at 11:55

## 2024-11-15 RX ADMIN — CEFEPIME 2 G: 2 INJECTION, POWDER, FOR SOLUTION INTRAVENOUS at 11:36

## 2024-11-15 NOTE — CONSULTS
[facility] Nurse Inpatient Assessment     Consulted for: R bka vac placement    Summary: patient has severe pain. Wound wrapping from OR had to be removed, then a 45 minute break for additional pain medication, then the vac was able to be placed.     Patient History (according to provider note(s):      Linda Loredo is a 79 year old female who was admitted on 11/12/2024.  I was asked by Dr. Nicholson to see the patient for pain secondary to amputation, in the setting of methadone use. Admitted for cellulitis with diabetic foot ulcer and necrosis of the bone and stage IV pressure ulcer. History of DM2, chronic neuropathy, CKD, chronic pain, heart failure.    shows Dilaudid refills over the last 2 months, routinely filling 90 tablets of methadone 5 mg. Describes pain as 5/10 and worse with dressing changes.  Allergy noted to morphine. In the last 24 hours Pat utilized 20mg methadone, 6.5mg IV dilaudid and 4mg po dilaudid.      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   Given allodynia suggest adding nerve membrane stabilizing agent Lyrica.  Also could try 1 dose of ketamine.  Although use all agents with great caution given advanced age and high baseline MME.  Multimodal Medication Therapy:   Adjuvants: add scheduled tylenol, add lyrica 25mg at HS (increase to BID on Sunday)  Opioids: Continue PRN dilaudid 2mg PO Q3h PRN (may require 4mg dose)  PRN IV dilaudid 0.2mg IV prn   Methadone 5mg QID per home dose - QtC 390  Non-medication interventions- Ice   Constipation Prophylaxis-add senna  Follow up /Discharge Recommendations - We recommend prescribing the following at the time of discharge:  likely qid methadone and PO dilaudid.   Follow up with pain specialist in 6-9 weeks Capri Cabrera NP     Assessment:      Negative pressure wound therapy applied to: RLE        11/15    Last photo: 11/15   Wound due to: Surgical Wound   Wound history/plan of care:    Surgical date: 11/14   Service following: Vascular  Date Negative Pressure Wound Therapy initiated: 11/15   Interventions in place: offloading  Is patient s nutritional status compromised? no   If yes, what interventions are in place? Protein supplements  Reason for initiating vac therapy? High risk of infections  Which?of?the?following?co-morbidities?apply? Diabetes  If diabetic is patient on a diabetic management program? Yes   Is osteomyelitis present in wound? no   If yes what treatments are in place? N/A    Wound base: Calf 100 % Dermis, BKA 10% bone; 90 % Adipose tissue/muscle     Palpation of the wound bed: normal       Drainage: small      Volume in cannister: 0     Last cannister change date: 11/15     Description of drainage: bloody      Measurements (length x width x depth, in cm) Calf 22  x 15  x  0.2 cm  ; BKA 10 x 9 x 1 cm     Tunneling N/A      Undermining N/A   Periwound skin: Intact       Color: pink       Temperature: warm   Odor: mild   Pain: severe, Irritable or crying out at intervals, crying inconsolably, tension to hands, feet and body, facial expression of distress, and during dressing change, shooting and constant   Pain intervention prior to dressing change: slow and gentle cares ; break in middle of treatment; IV and PO pain medications, see MAR  Treatment goal: Infection control/prevention, Protection, and Promote epidermal migration  STATUS: initial assessment   Supplies ordered: gathered    Number of foam pieces removed from a wound (excluding foam for bridge) :  0 GranuFoam Black and 0 urgotul    Verified this matched the number of foam pieces applied last dressing change: Yes   Number of foam pieces packed into wound (excluding foam for bridge) :  1 GranuFoam Black and 1 urgotul  to BKA; 1 large peel and place vac dressing to calf       Treatment Plan:     Negative pressure wound therapy  "plan:  Wound location: R calf and BKA   Change Days:  Every 5 days  by WOC RN    Supplies (including all accessories) used:  Black foam , Cavilon no sting barrier film, and mepitel or urgotul  Cleanse with Vashe prior to replacing NPWT  Suction setting: -100   Methods used: Window paned all periwound skin with vac drape prior to applying sponge    Staff RN to assess integrity of dressing and ensure suction is set at appropriate level every shift.   Date canister. Chart canister output every shift. Change cannister weekly and PRN if full/occluded     Remove foam dressing and replace with BID normal saline moist gauze dressing if:   -a dressing failure which cannot be repaired within 2 hours   -patient is discharging to home without a home pump   -patient is discharging to a facility outside the local area   -if a dressing is a \"Silver Foam\", remove before Radiation Therapy or MRI     The hospital VAC pump is not to be discharged with the patient. Please disconnect the patient from the machine prior to discharge.  If a home VAC pump has been delivered, connect the home cannister to dressing tubing and the cannister to the home pump, turn on home pump  If the patient is transferring to a nearby facility with a VAC, the tubing can be disconnected, clamp tubing and cover the end with a glove, then can be reconnected if within 2 hours  If transfer will be longer than 2 hours, dressing must be removed and placed with a wet to moist gauze dressing for transfer        Orders: Written    RECOMMEND PRIMARY TEAM ORDER: None, at this time  Education provided: plan of care, wound progress, and Off-loading pressure  Discussed plan of care with: Patient, Family, and Nurse  WO nurse follow-up plan: twice weekly  [facility] if wound(s) deteriorate.  Nursing to notify the Provider(s) and re-consult the WO Nurse if new skin concern.    DATA:     Current support surface: Standard  Standard gel mattress (Isoflex)  Containment of " urine/stool: Incontinence Protocol  BMI: Body mass index is 34.28 kg/m .   Active diet order: Orders Placed This Encounter      Advance Diet as Tolerated: Regular Diet Adult; Moderate Consistent Carb (60 g CHO per Meal) Diet; 2 gm NA Diet     Output: I/O last 3 completed shifts:  In: -   Out: 1000 [Urine:1000]     Labs:   Recent Labs   Lab 11/15/24  0812 11/14/24  0748 11/13/24  0150 11/12/24  1438   ALBUMIN 2.8*  --   --   --    HGB 8.9*   < > 8.8* 9.8*   INR  --   --  1.73* 1.69*   WBC 5.9   < > 7.5 9.4   A1C  --   --   --  8.2*    < > = values in this interval not displayed.     Pressure injury risk assessment:   Sensory Perception: 3-->slightly limited  Moisture: 3-->occasionally moist  Activity: 2-->chairfast  Mobility: 2-->very limited  Nutrition: 2-->probably inadequate  Friction and Shear: 2-->potential problem  Suresh Score: 14    JOHAN LindsayN RN CWOCN  Pager no longer in use, please contact through [employer] group: [facility]

## 2024-11-15 NOTE — PLAN OF CARE
Goal Outcome Evaluation:  Patient alert and oriented x4. Room air.   Right surgery/amputation site pain 9/10. Pageflo PARIKH for additional pain medication. 2 doses of oral dilaudid ordered, both give.   Right stump dressed. Small serosanguinous drainage.   Voiding. Purewick in place.  No BM since admission. PRN senna administered.  PIV patent. Flushed.   Activity: wheel chair at baseline. Transfers independently. Has been bedrest since admission.   VSS. Afebrile.     Makes needs known. Call light within reach. Bed at lowest position. Bed alarm on.    Pt request: keep lights on, door, and curtains open at all times.          HLM Admission: 4- Move to Chair  HL Daily1- Lying in bed                        Problem: Pain Acute  Goal: Optimal Pain Control and Function  Outcome: Not Progressing  Intervention: Develop Pain Management Plan  Recent Flowsheet Documentation  Taken 11/15/2024 0150 by Janie Hardwick, RN  Pain Management Interventions: medication (see MAR)     Problem: Skin or Soft Tissue Infection  Goal: Absence of Infection Signs and Symptoms  Outcome: Progressing

## 2024-11-15 NOTE — PROGRESS NOTES
CLINICAL NUTRITION SERVICES - ASSESSMENT NOTE     Nutrition Prescription    RECOMMENDATIONS FOR MDs/PROVIDERS TO ORDER:  None     Malnutrition Status:    Patient does not meet two of the above criteria necessary for diagnosing malnutrition    Recommendations already ordered by Registered Dietitian (RD):  Nutrition education for nutrition relationship to health/disease - protein and carb counting  Medical food supplement therapy - Glucerna BID + Expedite daily    Future/Additional Recommendations:  Monitor po intake, protein intake, wound healing, ONS tolerance     REASON FOR ASSESSMENT  Linda Loredo is a/an 79 year old female assessed by the dietitian for Admission Nutrition Risk Screen for positive: unsure wt loss, no decreased appetite    NUTRITION HISTORY  Dx: Cellulitis of diabetic foot ulcer with necrosis of the bone and underlying osteomyelitis  Pressure ulcer of the right heel, stage 4  - R BKA on   PMH: T2DM on Tresiba, paroxysmal A-fib, CHF, fibromyalgia, HLD, HTN, anemia of chronic disease, lymphedema     Met with pt in room this morning. We discussed pt's high protein needs for wound healing with new R BKA, and carb counting for BG control. Pt reports she already eats a very high protein diet most days, and really enjoys eating meat. Pt also has Glucerna on hand where she lives, and drinks them occasionally. Pt lives at [facility], and is able to get meals there, but her dtr and family also bring her meals from home.     We discussed the supplements Expedite and Dave for wound healing: pt was advised to get Dave for home use as it's cheaper, and to take Expedite while admitted, as this only needs to be taken once daily.     CURRENT NUTRITION ORDERS  Diet: Moderate Consistent Carbohydrate, 2g Na diet  Intake/Tolerance: Pt ordering meals at regular meal times (when not NPO). 75% intake noted x1 in flow sheets.    LABS  Labs reviewed  A1C: 8.2 (H)  B-212 (H) past 24  "hours    MEDICATIONS  Medications reviewed  Novolog  Kcl    ANTHROPOMETRICS  Height: 167.6 cm (5' 6\")  Most Recent Weight: 96.3 kg (212 lb 6.4 oz)    IBW: 59.3 kg  BMI: Obesity Grade I BMI 30-34.9  Weight History:   11/14/24 : 96.3 kg (212 lb 4.9 oz)  07/22/24 : 104.5 kg (230 lb 6.4 oz) - ? Accuracy of this wt  07/18/24 : 99.4 kg (219 lb 3.2 oz)  07/08/24 : 99 kg (218 lb 3.2 oz)  06/24/24 : 102.9 kg (226 lb 12.8 oz)  06/12/24 : 111.4 kg (245 lb 8 oz)  06/10/24 : 115.2 kg (253 lb 15.5 oz)  04/01/24 : 104.4 kg (230 lb 3.2 oz)  03/08/24 : 107.6 kg (237 lb 3.2 oz)  Pt's wt fluctuations over short periods of time are likely related to hx of edema/lymphedema.    Dosing Weight: 68.5 kg adjusted body wt    ASSESSED NUTRITION NEEDS  Estimated Energy Needs: 5029-4674 kcals/day (25 - 30 kcals/kg)  Justification: Maintenance  Estimated Protein Needs:  grams protein/day (1.3 - 1.5 grams of pro/kg)  Justification: Wound healing  Estimated Fluid Needs: 8250-9886 mL/day (1 mL/kcal)   Justification: Maintenance    PHYSICAL FINDINGS  Edema: 3+ Moderate in R leg, R knee, R ankle, R foot    MALNUTRITION:  % Weight Loss:  Weight loss does not meet criteria for malnutrition   % Intake:  No decreased intake noted  Subcutaneous Fat Loss:  None observed  Muscle Loss:  None observed  Fluid Retention:  Moderate in R leg, R knee, R ankle, R foot    Malnutrition Diagnosis: Patient does not meet two of the above criteria necessary for diagnosing malnutrition    NUTRITION DIAGNOSIS  Increased nutrient needs (protein) related to R BKA wound healing as evidenced by 1.3-1.5 g/kg/day protein intake      INTERVENTIONS  Implementation  Nutrition education for nutrition relationship to health/disease - protein and carb counting  Medical food supplement therapy - Glucerna BID + Expedite daily    Goals  Total avg nutritional intake to meet a minimum of 25 kcal/kg and 1.3 g PRO/kg daily (per dosing wt 68.5 kg).     Monitoring/Evaluation  Progress " toward goals will be monitored and evaluated per protocol.

## 2024-11-15 NOTE — PLAN OF CARE
Patient alert and orientated. Pain finally under control with another dose of IV dilaudid given 2000. Patient resting and sleeping on and off. Dressing intact to lower extremity. Minimal drainage noted. See vascular note regarding reinforcing dressing. They will round on her in the AM. Utilizing purewick, awaiting void-informed patient to void as will have to straight cath if unable. Blood sugar at 2000 181- one unit coverage given.     Problem: Adult Inpatient Plan of Care  Goal: Optimal Comfort and Wellbeing  Outcome: Progressing     Problem: Adult Inpatient Plan of Care  Goal: Readiness for Transition of Care  Outcome: Progressing   Goal Outcome Evaluation:

## 2024-11-15 NOTE — PROGRESS NOTES
"Patient has not voided. No urine in the purewick. Woke patient around 2230 to encourage to void but patient falling asleep quickly.   Back to assess at 2300 and checked the brief and is wet but patient not allowing us to change and see how wet.   Bladder scan was >533 however was not after a void.   Educated on the importance of changing brief, checking skin, voiding after procedure, and repositioning.   Patient became verbally abusive calling staff \"a bitch\" and verbalizing \"I've been through this, no one touches this leg, you cannot wake someone up and just do this\".   Patient refusing all cares even after education. She states \"I will pee just give me time\"   Will let patient have time and inform on coming.   " 02/28/19 1407   Pain Assessment   Pain Assessment 0-10   Pain Score 4   Pain Type Surgical pain   Pain Location Knee   Pain Orientation Right   Pain Descriptors Burning   Pain Frequency Constant/continuous   Pain Onset Ongoing   Clinical Progression Gradually improving   Patient's Stated Pain Goal No pain   Hospital Pain Intervention(s) Medication (See MAR); Ambulation/increased activity   Restrictions/Precautions   Weight Bearing Precautions Per Order Yes   RLE Weight Bearing Per Order WBAT   Other Precautions WBS; Multiple lines; Fall Risk;Pain   General   Chart Reviewed Yes   Family/Caregiver Present No   Cognition   Overall Cognitive Status WFL   Arousal/Participation Alert; Cooperative   Attention Within functional limits   Orientation Level Oriented X4   Memory Within functional limits   Following Commands Follows all commands and directions without difficulty  (pt cont's to be resistant to suggestions & physical assistan)   Comments pt agreed to PT treatment   Subjective   Subjective "I'll get up to walk but I need the urinal first  I don't want you to touch you to touch me when I walk  It throws off my balance "   Bed Mobility   Supine to Sit 5  Supervision   Additional items Assist x 1;Bedrails; Increased time required;Verbal cues   Transfers   Sit to Stand 3  Moderate assistance   Additional items Assist x 2;Bedrails; Increased time required;Verbal cues   Stand to Sit 4  Minimal assistance   Additional items Assist x 1; Armrests; Increased time required;Verbal cues   Additional Comments stood x 2-3 mins to urinate in urinal at RW with mod x 2 assist   Ambulation/Elevation   Gait pattern Improper Weight shift; Wide BLAKE; Decreased foot clearance;Decreased R stance; Antalgic; Short stride; Step to   Gait Assistance 4  Minimal assist   Additional items Assist x 2;Verbal cues; Tactile cues   Assistive Device Rolling walker   Distance 52 feet x 1   Stair Management Assistance Not tested   Balance   Static Sitting Good Dynamic Sitting Fair   Static Standing Fair -   Dynamic Standing Poor +   Ambulatory Poor +   Endurance Deficit   Endurance Deficit Yes   Endurance Deficit Description fatigues quickly with activity   Activity Tolerance   Activity Tolerance Patient limited by fatigue;Patient limited by pain   Assessment   Prognosis Good   Problem List Decreased strength;Decreased range of motion;Decreased endurance; Impaired balance;Decreased mobility; Decreased coordination;Decreased safety awareness; Impaired sensation;Decreased skin integrity;Orthopedic restrictions;Pain   Assessment Pt seen for PT treatment session this date with interventions consisting of gait training w/ emphasis on improving pt's ability to ambulate level surfaces x 52 feet x 1 with min A of 2 provided by therapist with RW  Pt agreeable to PT treatment session upon arrival, pt found supine in bed w/ HOB elevated, in no apparent distress  In comparison to previous session, pt with improvements in amb distance  Post session: all needs in reach OOB in bedside chair, SCDs active  Continue to recommend Home PT at time of d/c in order to maximize pt's functional independence and safety w/ mobility  Pt continues to be functioning below baseline level, and remains limited 2* factors listed above and including decreased strength, endurance & safe functional mobility  PT will continue to see pt while here in order to address the deficits listed above and provide interventions consistent w/ POC in effort to achieve STGs  Barriers to Discharge Inaccessible home environment;Decreased caregiver support   Goals   Patient Goals to go home tomorrow, I'm not going anywhere else   Treatment Day 3   Plan   Treatment/Interventions Functional transfer training;LE strengthening/ROM; Therapeutic exercise; Endurance training;Patient/family training;Equipment eval/education; Bed mobility;Gait training;Spoke to nursing;Spoke to case management   Progress Progressing toward goals   PT Frequency 7x/wk; Twice a day   Recommendation   Recommendation Home PT   Equipment Recommended Walker   PT - OK to Discharge No   Additional Comments pt OOB in chair, all needs in reach, SCDs active post session   Ruddy Davis, PTA

## 2024-11-15 NOTE — CONSULTS
11/15- test claim for DOAC- This patient has a special needs plan with Medicare/MA- both Eliquis and Xarelto are on the formulary and both should be covered 100% however and meds will need to be sent to her regular pharmacy for proper and accurate billing. Thank you for allowing me to help with your patient  Capri Thao [facility]  Pharmacy Discharge Liaison [facility]

## 2024-11-15 NOTE — PROGRESS NOTES
S: Pt is a 79 yof seen at [facility], room 228, for a prosthetic consult regarding a right BK amputation, requested by Dr. Myron MD. Pt was accompanied by two family members today.  O: S/p right BK guillotine amputation. Completion surgery tbd.  A: I consulted with with Linda today regarding her recent amputation. Pt and family members had no questions at this time. I did provide the pt with an amputee bag including written educational materials for her and her family to review, as well as our clinic's contact information.  P: Pt to follow-up up as needed. Pt will need a limb protector once completion surgery is done.    Electronically signed by Yael Orlando CPO, NAYANO

## 2024-11-15 NOTE — PHARMACY-VANCOMYCIN DOSING SERVICE
Pharmacy Vancomycin Note  Date of Service November 15, 2024  Patient's  1945   79 year old, female    Indication: Bone and Joint Infection and Skin and Soft Tissue Infection  Day of Therapy: 4  Current vancomycin regimen:  1750 mg IV q24h  Current vancomycin monitoring method: AUC  Current vancomycin therapeutic monitoring goal: 400-600 mg*h/L    InsightRX Prediction of Current Vancomycin Regimen    Regimen: 1750 mg IV every 24 hours.  Start time: 16:23 on 11/15/2024  Exposure target: AUC24 (range)400-600 mg/L.hr   AUC24,ss: 652 mg/L.hr  Probability of AUC24 > 400: 100 %  Ctrough,ss: 19.7 mg/L  Probability of Ctrough,ss > 20: 47 %  Probability of nephrotoxicity (Lodise COSTA ): 17 %    Current estimated CrCl = Estimated Creatinine Clearance: 61.3 mL/min (based on SCr of 0.87 mg/dL).    Creatinine for last 3 days  2024:  2:38 PM Creatinine 0.81 mg/dL  2024:  1:50 AM Creatinine 0.89 mg/dL  2024:  7:48 AM Creatinine 0.86 mg/dL  11/15/2024:  8:12 AM Creatinine 0.87 mg/dL    Recent Vancomycin Levels (past 3 days)  11/15/2024:  8:12 AM Vancomycin 22.3 ug/mL    Vancomycin IV Administrations (past 72 hours)                     vancomycin (VANCOCIN) 1,750 mg in 0.9% NaCl 517.5 mL intermittent infusion (mg) 1,750 mg New Bag 24 1623     1,750 mg New Bag 24 1756    vancomycin (VANCOCIN) 2,000 mg in 0.9% NaCl 500 mL intermittent infusion (mg) 2,000 mg New Bag 24 1543                    Nephrotoxins and other renal medications (From now, onward)      Start     Dose/Rate Route Frequency Ordered Stop    11/15/24 1600  vancomycin (VANCOCIN) 1,250 mg in 0.9% NaCl 250 mL intermittent infusion         1,250 mg  over 90 Minutes Intravenous EVERY 24 HOURS 11/15/24 1058      24 0800  torsemide (DEMADEX) tablet 60 mg        Note to Pharmacy: PTA Sig:Take 60 mg by mouth daily      60 mg Oral DAILY [id number]                 Contrast Orders - past 72 hours (72h ago, onward)      Start      Dose/Rate Route Frequency Stop    11/13/24 1100  perflutren lipid microsphere (DEFINITY) injection SUSP 2 mL         2 mL Intravenous ONCE 11/13/24 1056    11/12/24 2300  gadobutrol (GADAVIST) injection 10 mL  Status:  Discontinued         10 mL Intravenous ONCE 11/13/24 0009            Interpretation of levels and current regimen:  Vancomycin level is reflective of AUC greater than 600  Has serum creatinine changed greater than 50% in last 72 hours: No  Renal Function: Stable    InsightRX Prediction of Planned New Vancomycin Regimen    Regimen: 1250 mg IV every 24 hours.  Start time: 16:23 on 11/15/2024  Exposure target: AUC24 (range)400-600 mg/L.hr   AUC24,ss: 475 mg/L.hr  Probability of AUC24 > 400: 87 %  Ctrough,ss: 14.3 mg/L  Probability of Ctrough,ss > 20: 8 %  Probability of nephrotoxicity (Lodise COSTA 2009): 9 %      Plan:  Decrease Dose to 1250mg q24h   Vancomycin monitoring method: AUC  Vancomycin therapeutic monitoring goal: 400-600 mg*h/L  Pharmacy will check vancomycin levels as appropriate in 1-3 Days.  Serum creatinine levels will be ordered daily for the first week of therapy and at least twice weekly for subsequent weeks.    [facility], located within [facility]

## 2024-11-15 NOTE — PROGRESS NOTES
Received phone call from Vascular surgery for update.   Informed of pain control and dressing drainage onto pillow grapefruit size in serosanguinous in color. Placed chux underneath to continue to monitor.   MD stated ok to reinforce with ABDs as needed. If moderate to significant drainage please update using pager number [phone].  Please see MD previous note for more details.

## 2024-11-15 NOTE — CONSULTS
SPIRITUAL HEALTH SERVICES ([facility])  SPIRITUAL ASSESSMENT Progress Note  [facility]. [address] Med Surgery     REFERRAL SOURCE: Consults - Thank you for the referrals.      I met with Margaret, her daughter Lu and her son-in-law. Her bedside nurse had informed me of the difficulties with pain that Margaret was having today, post-surgery.      I facilitated a short communion service for Margaret and her family which was her priority at the time of the visit.      PLAN: I will followup when next on site. .      Jeanne Solares, Ph.D., [facility]      [facility] available 24/7 for emergency requests/referrals, either by having the on-call  paged or by entering an ASAP/STAT consult in Epic (this will also page the on-call ).

## 2024-11-15 NOTE — PLAN OF CARE
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 Post surgery her pain has been difficult to control but the increase in pain medication appears to be helping. Will continue to monitor effectiveness. Dressing is CDI and having her leg elevated has helped with pain. She is A&Ox4 and was able to be advanced to a full liquid diet. Bowel sounds normal but she denies passing gas at this time. Daughter at bedside. Bed in lowest position, call light within reach, and bed alarm armed. Pt likes her lights and her TV on.     HLM Admission: Sat on edge on bed/wheelchair  HLM Daily1- Lying in bed - amputation            Goal Outcome Evaluation:      Plan of Care Reviewed With: patient    Overall Patient Progress: improvingOverall Patient Progress: improving       Problem: Adult Inpatient Plan of Care  Goal: Plan of Care Review  Description: The Plan of Care Review/Shift note should be completed every shift.  The Outcome Evaluation is a brief statement about your assessment that the patient is improving, declining, or no change.  This information will be displayed automatically on your shift  note.  Outcome: Progressing  Flowsheets (Taken 11/14/2024 1824)  Plan of Care Reviewed With: patient  Overall Patient Progress: improving  Goal: Absence of Hospital-Acquired Illness or Injury  Intervention: Identify and Manage Fall Risk  Recent Flowsheet Documentation  Taken 11/14/2024 1820 by Charlotte Ruiz RN  Safety Promotion/Fall Prevention: safety round/check completed  Taken 11/14/2024 0813 by Charlotte Ruiz RN  Safety Promotion/Fall Prevention: safety round/check completed  Intervention: Prevent Skin Injury  Recent Flowsheet Documentation  Taken 11/14/2024 1820 by Charlotte Ruiz RN  Body Position: supine, head elevated  Intervention: Prevent Infection  Recent Flowsheet Documentation  Taken 11/14/2024 1820 by Charlotte Ruiz RN  Infection Prevention: single patient room provided  Taken 11/14/2024 0813 by Charlotte Ruiz RN  Infection Prevention: single  patient room provided  Goal: Optimal Comfort and Wellbeing  Outcome: Progressing  Intervention: Monitor Pain and Promote Comfort  Recent Flowsheet Documentation  Taken 11/14/2024 1818 by Charlotte Ruiz RN  Pain Management Interventions: (Will continue to monitor. Pt more calm and able to eat.) repositioned  Taken 11/14/2024 1613 by Charlotte Ruiz RN  Pain Management Interventions: (MD paged. No other options.) --  Taken 11/14/2024 1609 by Charlotte Ruiz RN  Pain Management Interventions: repositioned  Taken 11/14/2024 1015 by Charlotte Ruiz RN  Pain Management Interventions: declines  Taken 11/14/2024 0844 by Charlotte Ruiz RN  Pain Management Interventions: medication (see MAR)  Goal: Readiness for Transition of Care  Outcome: Progressing

## 2024-11-15 NOTE — CONSULTS
[facility] ACUTE INPATIENT PAIN SERVICE    [facility], [facility], [facility], [facility], [place]   PAIN Consult       Assessment/Plan:  Linda Loredo is a 79 year old female who was admitted on 11/12/2024.  I was asked by Dr. Nicholson to see the patient for pain secondary to amputation, in the setting of methadone use. Admitted for cellulitis with diabetic foot ulcer and necrosis of the bone and stage IV pressure ulcer. History of DM2, chronic neuropathy, CKD, chronic pain, heart failure.    shows Dilaudid refills over the last 2 months, routinely filling 90 tablets of methadone 5 mg. Describes pain as 5/10 and worse with dressing changes.  Allergy noted to morphine. In the last 24 hours Pat utilized 20mg methadone, 6.5mg IV dilaudid and 4mg po dilaudid.     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   Given allodynia suggest adding nerve membrane stabilizing agent Lyrica.  Also could try 1 dose of ketamine.  Although use all agents with great caution given advanced age and high baseline MME.  Multimodal Medication Therapy:   Adjuvants: add scheduled tylenol, add lyrica 25mg at HS (increase to BID on Sunday)  Opioids: Continue PRN dilaudid 2mg PO Q3h PRN (may require 4mg dose)  PRN IV dilaudid 0.2mg IV prn   Methadone 5mg QID per home dose - QtC 390  Non-medication interventions- Ice   Constipation Prophylaxis-add senna  Follow up /Discharge Recommendations - We recommend prescribing the following at the time of discharge:  likely qid methadone and PO dilaudid.   Follow up with pain specialist in 6-9 weeks aCpri Cabrera NP           Subjective:    Met with patient and her daughter.  Patient notes extreme pain after dressing change.  The skin in the calf are the most painful.  Cannot tolerate even a sheet on her right leg.  No phantom pain.  No phantom sensation.    Did have a very  "small BM 2 days ago.    We talked about trying Lyrica.  In 2007 she was on Lyrica and apparently fell backwards at [employer] (and hit her head).  Although per chart review it sounds like she was on a much higher dose (100 mg 3 times daily) and also on tizanidine and other sedatives at that time.    The patient, her daughter, and son-in-law-I talked about multiple options for pain including Lyrica, Dilaudid.  Discussed optimizing oral regimen.  We talked about the risk of overdose with high MME.  Patient and daughter feel that pain was initially very poorly controlled coming out of surgery and that things should be, \"settling down now\".     Discussed pain management with attending physician.  Patient has had significant pain control issues over the last few days.  Unable to place wound VAC due to pain.      History   Drug Use No         Tobacco Use      Smoking status: Never      Smokeless tobacco: Never        Objective:  Vital signs in last 24 hours:  B/P: 119/72, T: 97.2, P: 98, R: 22   Blood pressure 119/72, pulse 98, temperature 97.2  F (36.2  C), temperature source Oral, resp. rate 22, height 1.676 m (5' 6\"), weight 96.3 kg (212 lb 6.4 oz), SpO2 98%.        Review of Systems:   As per subjective, all others negative.    Physical Exam    General: in no apparent distress and non-toxic  - grimacing at times   HEENT: very poor dentician   CV:  no chest pain   Resp: No cough   GI: distended   Skin: No rashes or lesions  Extremities: No peripheral edema  Psych: Normal affect, mood euthymic  Neuro: Grossly normal          Imaging:  Personally Reviewed.    Results for orders placed or performed during the hospital encounter of 11/12/24   US ALMA Doppler No Exercise 1-2 Levels Right    Impression    IMPRESSION:  1.  RIGHT LOWER EXTREMITY: ALMA is unable to be obtained due to wounds. The digital pressure is 26 mmHg which suggests poor wound healing potential. The duplex study reveals a large amount of arterial calcification. " Monophasic waveforms are seen within   the external iliac and common femoral artery suggesting a pelvic inflow stenosis. Brisk monophasic waveforms to the level of the popliteal artery without an additional identified high-grade stenosis. The below-knee arteries are not evaluated due to open   wounds.    2.  LEFT LOWER EXTREMITY: Amputation.   US Lower Extremity Arterial rt    Impression    IMPRESSION:  1.  RIGHT LOWER EXTREMITY: ALMA is unable to be obtained due to wounds. The digital pressure is 26 mmHg which suggests poor wound healing potential. The duplex study reveals a large amount of arterial calcification. Monophasic waveforms are seen within   the external iliac and common femoral artery suggesting a pelvic inflow stenosis. Brisk monophasic waveforms to the level of the popliteal artery without an additional identified high-grade stenosis. The below-knee arteries are not evaluated due to open   wounds.    2.  LEFT LOWER EXTREMITY: Amputation.   MR Ankle Right w/o Contrast    Impression    IMPRESSION:    Acute osteomyelitis of the posterior calcaneus, fifth metatarsal, third and fourth metatarsal bases, cuboid, and lateral cuneiform, as detailed above. No evidence of abscess or osteonecrosis.      MR Foot Right w/o Contrast    Impression    IMPRESSION:    Acute osteomyelitis of the posterior calcaneus, fifth metatarsal, third and fourth metatarsal bases, cuboid, and lateral cuneiform, as detailed above. No evidence of abscess or osteonecrosis.           Lab Results:  Personally Reviewed.   Last Comprehensive Metabolic Panel:  Sodium   Date Value Ref Range Status   11/15/2024 136 135 - 145 mmol/L Final   02/22/2008 140 133 - 144 mmol/L Final     Potassium   Date Value Ref Range Status   11/15/2024 4.0 3.4 - 5.3 mmol/L Final   06/28/2022 4.3 3.5 - 5.0 mmol/L Final   02/22/2008 4.5 3.4 - 5.3 mmol/L Final     Chloride   Date Value Ref Range Status   11/15/2024 97 (L) 98 - 107 mmol/L Final   06/28/2022 97 (L) 98  "- 107 mmol/L Final   02/22/2008 100 94 - 109 mmol/L Final     Carbon Dioxide   Date Value Ref Range Status   02/22/2008 28 20 - 32 mmol/L Final     Carbon Dioxide (CO2)   Date Value Ref Range Status   11/15/2024 29 22 - 29 mmol/L Final   06/28/2022 29 22 - 31 mmol/L Final     Anion Gap   Date Value Ref Range Status   11/15/2024 10 7 - 15 mmol/L Final   06/28/2022 13 5 - 18 mmol/L Final   02/22/2008 11 6 - 17 mmol/L Final     Glucose   Date Value Ref Range Status   11/15/2024 132 (H) 70 - 99 mg/dL Final   06/28/2022 73 70 - 125 mg/dL Final   02/22/2008 298 (H) 60 - 99 mg/dL Final     GLUCOSE BY METER POCT   Date Value Ref Range Status   11/15/2024 165 (H) 70 - 99 mg/dL Final     Urea Nitrogen   Date Value Ref Range Status   11/15/2024 16.8 8.0 - 23.0 mg/dL Final   06/28/2022 42 (H) 8 - 28 mg/dL Final   02/22/2008 15 7 - 30 mg/dL Final     Creatinine   Date Value Ref Range Status   11/15/2024 0.87 0.51 - 0.95 mg/dL Final   02/22/2008 0.82 0.60 - 1.30 mg/dL Final     GFR Estimate   Date Value Ref Range Status   11/15/2024 67 >60 mL/min/1.73m2 Final     Comment:     eGFR calculated using 2021 CKD-EPI equation.   02/22/2008 75 >60 mL/min/1.7m2 Final     Calcium   Date Value Ref Range Status   11/15/2024 8.8 8.8 - 10.4 mg/dL Final     Comment:     Reference intervals for this test were updated on 7/16/2024 to reflect our healthy population more accurately. There may be differences in the flagging of prior results with similar values performed with this method. Those prior results can be interpreted in the context of the updated reference intervals.   02/22/2008 10.4 8.5 - 10.4 mg/dL Final        UA: No results found for: \"UAMP\", \"UBARB\", \"BENZODIAZEUR\", \"UCANN\", \"UCOC\", \"OPIT\", \"UPCP\"           Please see A&P for additional details of medical decision making.  MANAGEMENT DISCUSSED with the following over the past 24 hours: patient, daughter, MD   NOTE(S)/MEDICAL RECORDS REVIEWED over the past 24 hours: medicine, nursing, " vascular, past PCP notes  Tests personally interpreted in the past 24 hours:  - MRI  showing acute osteomyelitis   Tests ORDERED & REVIEWED in the past 24 hours:  - UDS  SUPPLEMENTAL HISTORY, in addition to the patient's history, over the past 24 hours obtained from:   - daughter  Medical complexity over the past 24 hours:  -------------------------- HIGH RISK FOR MORBIDITY -------------------------------------------------------------  - Parenteral (IV) CONTROLLED SUBSTANCES ordered           Tasia ROSA, CNS, CNP  Acute Care Pain Management  Team  Hours of pain coverage Mon-Fri 8-1600  After hours contact the primary team  [facility] (RASHID, Lacey, SD, RH)   Page via [employer] web console -Click for [employer]

## 2024-11-15 NOTE — CONSULTS
SPIRITUAL HEALTH SERVICES ([facility])  SPIRITUAL ASSESSMENT Progress Note  [facility]. Unit Med Surgery 2N     REFERRAL SOURCE: Consults - Thank your for the referrals.      Margaret was having a difficult day post surgery and experiencing a lot of pain according to her bedside nurse. It was difficult to get in to see her given the number of providers also working with her today. She was not open to a conversation at the time but only wanted to receive communion. I facilitated a brief communion service with her, her daughter Lu and son-in law.       PLAN: I will plan to followup when next on site.      Jeanne Solares, Ph.D., [facility]      [facility] available 24/7 for emergency requests/referrals, either by having the on-call  paged or by entering an ASAP/STAT consult in Epic (this will also page the on-call ).

## 2024-11-15 NOTE — PROGRESS NOTES
"Infectious Diseases Progress Note  [facility]    Date of visit: 11/15/2024       ASSESSMENT   79-year-old woman with a history of diabetes, A-fib, CHF, fibromyalgia, hyperlipidemia, hypertension who is admitted with right foot infection.    Right foot osteomyelitis.  Presented to podiatry clinic for the first time yesterday with chronic right lower extremity wounds.  Admitted to the hospital for IV antibiotics.  MRI showing osteomyelitis in the posterior calcaneus, fifth metatarsal, and third and fourth metatarsal base.  Patient's foot is felt to be nonsalvageable.  Wound cultures are polymicrobial, with anaerobic growth. Vascular surgery performed guillotine amputation on 11/14.  History of left BKA  Chronic lymphedema and venostasis ulcers on lower ext     Active Problems:    Cellulitis of right lower extremity       PLAN   -Continue vancomycin   -discontinue pipercillin/tazobactam  -start cefepime and metronidazole based on susceptibilities      Jez Cage MD  [facility]  Direct messaging: [employer] Paging  On-Call ID provider: [phone], option: 9      ===========================================      SUBJECTIVE / INTERVAL HISTORY:     Surgery yesterday on right leg. Pain overnight, but overall better this morning.     Daughter on phone during visit.    Antibiotics   Vancomycin 11/12-  Zosyn 11/12-    Previous:  None      Physical Exam     Temp:  [96.8  F (36  C)-97.4  F (36.3  C)] 97.2  F (36.2  C)  Pulse:  [] 92  Resp:  [10-27] 20  BP: ()/(54-95) 103/60  SpO2:  [90 %-98 %] 95 %    /60 (BP Location: Right arm, Patient Position: Semi-Munguia's, Cuff Size: Adult Regular)   Pulse 92   Temp 97.2  F (36.2  C) (Oral)   Resp 20   Ht 1.676 m (5' 6\")   Wt 96.3 kg (212 lb 6.4 oz)   SpO2 95%   BMI 34.28 kg/m      GENERAL:  well-developed, well-nourished, lying in bed in no acute distress.   HENT:  Head is normocephalic, atraumatic.   EYES:  Eyes have " anicteric sclerae without conjunctival injection   LUNGS:  normal respiratory pattern   EXT: Status post left BKA.  Right leg wrapped, s/p foot amputation.  SKIN:  No acute rashes.   NEUROLOGIC:  Grossly nonfocal.      Cultures    right foot wound: GPC's; culture: Providencia spp, pseudomonas, S.aureus, E.faecalis, and P.mirabilis   blood culture: No growth to date    Susceptibility data from last 90 days.  Collected Specimen Info Organism Ampicillin Ampicillin/Sulbactam Cefepime Ceftazidime Ceftriaxone Ciprofloxacin Gentamicin Gentamicin Synergy Levofloxacin Meropenem Piperacillin/Tazobactam Tobramycin   24 Wound from Foot, Right Pseudomonas aeruginosa    S  S   R    R  S   S     Providencia rettgeri R  I  S  S  S  S  S   I  S  R  S     Proteus mirabilis  S  S  S  S  S  S  S   S  S  S  S     Enterococcus faecalis  S        R         Staphylococcus aureus               24 Wound from Foot, Right Mixed Aerobic and Anaerobic danny                 Bacteroides ovatus/xylanisolvens                 Collected Specimen Info Organism Trimethoprim/Sulfamethoxazole  Vancomycin   24 Wound from Foot, Right Pseudomonas aeruginosa       Providencia rettgeri  S      Proteus mirabilis  S      Enterococcus faecalis   S     Staphylococcus aureus     24 Wound from Foot, Right Mixed Aerobic and Anaerobic danny       Bacteroides ovatus/xylanisolvens          Pertinent Labs:     Recent Labs   Lab 11/15/24  0812 24  0748 24  0150   WBC 5.9 5.4 7.5   HGB 8.9* 9.0* 8.8*    278 279       Recent Labs   Lab 11/15/24  0812 24  0748 24  0150    139 139   CO2 29 30* 33*   BUN 16.8 21.3 23.3*       Recent Labs   Lab 24  1438   CRPI 61.50*           Imaging:     Echocardiogram Complete    Result Date: 2024  [id number]^KATYA^[place] [address]  Name: ALEXIA BURNS MRN: [id number] :  1945 Study Date: 11/13/2024 09:58 AM Age: 79 yrs Gender: Female Patient Location: [facility] Reason For Study: Atrial Fibrillation Ordering Physician: RANDAL ALDANA Performed By: ARTHUR  BSA: 2.1 m2 Height: 66 in Weight: 230 lb HR: 74 BP: 103/58 mmHg ______________________________________________________________________________ Procedure Complete Portable Echo Adult. Definity (NDC #17059-293) given intravenously. Technically difficult study. ______________________________________________________________________________ Interpretation Summary  1. Normal left ventricular size and systolic performance with a visually estimated ejection fraction of 60%. 2. No significant valvular heart disease is identified on this study. 3. Mild right ventricular enlargement with normal right ventricular systolic performance. 4. There is mild biatrial enlargement. ______________________________________________________________________________ Left ventricle: Normal left ventricular size and systolic performance with a visually estimated ejection fraction of 60%. There is normal regional wall motion. There is borderline concentric increase in left ventricular wall thickness.  Assessment of LV Diastolic Function: Patient appears to be in atrial fibrillation which limits assessment of diastolic filling.  Right ventricle: Mild right ventricular enlargement with normal right ventricular systolic performance.  Left atrium: There is mild left atrial enlargement.  Right atrium: There is mild right atrial enlargement.  IVC: The IVC is borderline dilated.  Aortic valve: The aortic valve is comprised of three cusps. No significant aortic stenosis or aortic insufficiency is detected on this study.  Mitral valve: The mitral valve appears morphologically normal. There is mild mitral insufficiency.  Tricuspid valve: The tricuspid valve is grossly morphologically normal. There is mild tricuspid insufficiency.  Pulmonic valve: The pulmonic valve is  grossly morphologically normal.  Thoracic aorta: The aortic root and proximal ascending aorta are of normal dimension.  Pericardium: There is no significant pericardial effusion. ______________________________________________________________________________ ______________________________________________________________________________ MMode/2D Measurements & Calculations IVSd: 1.2 cm LVIDd: 4.6 cm LVIDs: 3.4 cm LVPWd: 1.2 cm FS: 26.3 % LV mass(C)d: 200.8 grams LV mass(C)dI: 94.6 grams/m2 Ao root diam: 3.0 cm asc Aorta Diam: 3.6 cm LVOT diam: 2.0 cm LVOT area: 3.2 cm2 Ao root diam index Ht(cm/m): 1.8 Ao root diam index BSA (cm/m2): 1.4 Asc Ao diam index BSA (cm/m2): 1.7 Asc Ao diam index Ht(cm/m): 2.2 RWT: 0.52  Doppler Measurements & Calculations TR max ray: 213.1 cm/sec TR max P.2 mmHg RV S Ray: 13.5 cm/sec  ______________________________________________________________________________ Report approved by: Giovana Rodríguez 2024 11:04 AM       MR Ankle Right w/o Contrast    Result Date: 2024  EXAM: MR ANKLE RIGHT W/O CONTRAST, MR FOOT RIGHT W/O CONTRAST LOCATION: [facility] DATE: 2024 INDICATION: Right foot and ankle infection. COMPARISON: None available. TECHNIQUE: Unenhanced. FINDINGS: Study degraded by motion, particularly patient pain. IV access additionally failed, precluding the administration of intravenous contrast. There is a large ulceration at the posterior inferior aspect of the heel. This extends deep to abut the posteroinferior calcaneus, which demonstrates a shallow cortical erosion. Marrow edema extends anteriorly from this erosion into the mid calcaneus and  is compatible with acute osteomyelitis. There is an additional ulceration along the lateral aspect of the forefoot, which extends deep to abut the fifth metatarsal base.. There is associated cortical erosion of the adjacent fifth metatarsal base, with diffuse marrow edema of the fifth  metatarsal compatible with acute osteomyelitis. Additional marrow edema within the cuboid, lateral cuneiform, and the third and fourth metatarsal bases is also consistent with acute osteomyelitis. There is also the appearance of marrow edema within the fifth toe, though this may be artifactual. No evidence of osteonecrosis or abscess formation, within limitation of the noncontrast technique. There is some notable undermining associated with the distal ulceration at the lateral forefoot, which extends dorsally along the distal fifth metatarsal. There is diffuse, superficial soft tissue swelling throughout the foot and ankle, which could be a chewable to a combination of bland edema and cellulitis. No evidence of fracture, stress fracture, or avascular necrosis. No evidence of abscess. Subacute on chronic denervation of the intrinsic foot musculature, though some degree of edema within the intrinsic musculature at the lateral forefoot/midfoot could be secondary to a superimposed myositis. Foot and ankle tendons appear normal in course and caliber. Plantar fascia appears intact.     IMPRESSION: Acute osteomyelitis of the posterior calcaneus, fifth metatarsal, third and fourth metatarsal bases, cuboid, and lateral cuneiform, as detailed above. No evidence of abscess or osteonecrosis.     MR Foot Right w/o Contrast    Result Date: 11/13/2024  EXAM: MR ANKLE RIGHT W/O CONTRAST, MR FOOT RIGHT W/O CONTRAST LOCATION: [facility] DATE: 11/13/2024 INDICATION: Right foot and ankle infection. COMPARISON: None available. TECHNIQUE: Unenhanced. FINDINGS: Study degraded by motion, particularly patient pain. IV access additionally failed, precluding the administration of intravenous contrast. There is a large ulceration at the posterior inferior aspect of the heel. This extends deep to abut the posteroinferior calcaneus, which demonstrates a shallow cortical erosion. Marrow edema extends anteriorly from this  erosion into the mid calcaneus and  is compatible with acute osteomyelitis. There is an additional ulceration along the lateral aspect of the forefoot, which extends deep to abut the fifth metatarsal base.. There is associated cortical erosion of the adjacent fifth metatarsal base, with diffuse marrow edema of the fifth metatarsal compatible with acute osteomyelitis. Additional marrow edema within the cuboid, lateral cuneiform, and the third and fourth metatarsal bases is also consistent with acute osteomyelitis. There is also the appearance of marrow edema within the fifth toe, though this may be artifactual. No evidence of osteonecrosis or abscess formation, within limitation of the noncontrast technique. There is some notable undermining associated with the distal ulceration at the lateral forefoot, which extends dorsally along the distal fifth metatarsal. There is diffuse, superficial soft tissue swelling throughout the foot and ankle, which could be a chewable to a combination of bland edema and cellulitis. No evidence of fracture, stress fracture, or avascular necrosis. No evidence of abscess. Subacute on chronic denervation of the intrinsic foot musculature, though some degree of edema within the intrinsic musculature at the lateral forefoot/midfoot could be secondary to a superimposed myositis. Foot and ankle tendons appear normal in course and caliber. Plantar fascia appears intact.     IMPRESSION: Acute osteomyelitis of the posterior calcaneus, fifth metatarsal, third and fourth metatarsal bases, cuboid, and lateral cuneiform, as detailed above. No evidence of abscess or osteonecrosis.     US ALMA Doppler No Exercise 1-2 Levels Right    Result Date: 11/12/2024  EXAM: US LOWER EXTREMITY ARTERIAL RT, US ALMA DOPPLER NO EXERCISE, 1-2 LEVELS, RIGHT LOCATION: [facility] DATE: 11/12/2024 INDICATION: Eval PAD COMPARISON: None. TECHNIQUE: Duplex utilizing 2D gray-scale imaging, Doppler  interrogation with color-flow and spectral waveform analysis. ALMA FINDINGS: RIGHT                                               Brachial: -- Ankle (PT): Unable to be obtained due to open wounds. Ankle (DP): Unable to be obtained due to open wounds. Digit: 26 Index: 0.21   LEFT Brachial: 123 Ankle (PT): Amputation. Ankle (DP): Amputation. Digit: Amputation. RIGHT LOWER EXTREMITY ARTERIAL ASSESSMENT: External iliac artery: 135 cm/s Common femoral artery: 129 cm/s Profunda femoris artery: 80 cm/s SFA (proximal): 130 cm/s SFA (mid): 105 cm/s SFA (distal): 195 cm/s Popliteal artery: 181-182 cm/s Anterior tibial artery: Unable to be obtained due to open wounds. Posterior tibial artery: Unable to be obtained due to open wounds. Dorsalis pedis artery: Unable to be obtained due to open wounds.      IMPRESSION: 1.  RIGHT LOWER EXTREMITY: ALMA is unable to be obtained due to wounds. The digital pressure is 26 mmHg which suggests poor wound healing potential. The duplex study reveals a large amount of arterial calcification. Monophasic waveforms are seen within the external iliac and common femoral artery suggesting a pelvic inflow stenosis. Brisk monophasic waveforms to the level of the popliteal artery without an additional identified high-grade stenosis. The below-knee arteries are not evaluated due to open wounds. 2.  LEFT LOWER EXTREMITY: Amputation.    US Lower Extremity Arterial rt    Result Date: 11/12/2024  EXAM: US LOWER EXTREMITY ARTERIAL RT, US ALMA DOPPLER NO EXERCISE, 1-2 LEVELS, RIGHT LOCATION: [facility] DATE: 11/12/2024 INDICATION: Eval PAD COMPARISON: None. TECHNIQUE: Duplex utilizing 2D gray-scale imaging, Doppler interrogation with color-flow and spectral waveform analysis. ALMA FINDINGS: RIGHT                                               Brachial: -- Ankle (PT): Unable to be obtained due to open wounds. Ankle (DP): Unable to be obtained due to open wounds. Digit: 26 Index: 0.21    LEFT Brachial: 123 Ankle (PT): Amputation. Ankle (DP): Amputation. Digit: Amputation. RIGHT LOWER EXTREMITY ARTERIAL ASSESSMENT: External iliac artery: 135 cm/s Common femoral artery: 129 cm/s Profunda femoris artery: 80 cm/s SFA (proximal): 130 cm/s SFA (mid): 105 cm/s SFA (distal): 195 cm/s Popliteal artery: 181-182 cm/s Anterior tibial artery: Unable to be obtained due to open wounds. Posterior tibial artery: Unable to be obtained due to open wounds. Dorsalis pedis artery: Unable to be obtained due to open wounds.      IMPRESSION: 1.  RIGHT LOWER EXTREMITY: ALMA is unable to be obtained due to wounds. The digital pressure is 26 mmHg which suggests poor wound healing potential. The duplex study reveals a large amount of arterial calcification. Monophasic waveforms are seen within the external iliac and common femoral artery suggesting a pelvic inflow stenosis. Brisk monophasic waveforms to the level of the popliteal artery without an additional identified high-grade stenosis. The below-knee arteries are not evaluated due to open wounds. 2.  LEFT LOWER EXTREMITY: Amputation.         Data reviewed today: I reviewed all medications, new labs and imaging results over the last 24 hours. I personally reviewed no images or EKG's today.  The patient's care was discussed with the Patient and Patient's Family.

## 2024-11-15 NOTE — PROGRESS NOTES
[facility]    Progress Note and Post op check - Hospitalist Service       Date of Admission:  11/12/2024    Assessment & Plan   Linda DELGADILLO Awa Loredo is a 79 year old female admitted on 11/12/2024, for cellulitis of a R-sided diabetic foot ulcer with BKA performed on 11/14. PMHx of T2DM on Tresiba, paroxysmal A-fib, CHF, fibromyalgia, HLD, HTN, anemia of chronic disease, and lymphedema.     Cellulitis of diabetic foot ulcer with necrosis of the bone and underlying osteomyelitis  Pressure ulcer of the right heel, stage 4  S/p BKA, right leg   Regularly follows with wound care clinic. Vanc/Zosyn started on admission. MRI right foot and ankle revealed osteomyelitis of the calcaneus, metatarsals 3, 4, 5, and lateral cuneiform.  ABIs not obtained due to wounds; however, digital pressure suggested poor wound healing potential. Right BKA performed on 11/14. BMP largely normal and Hgb 8.9. Multiple bacteria present in wound culture, and antibiotics adjusted based on same. Acute pain managed with PO and IV dilaudid.   - Vascular surgery consult, appreciate expertise   - Podiatry signed off    -ID consult, appreciate expertise    - Zosyn discontinued (rec'd 11/12 - 11/15)  - Continue vancomycin (11/12 - )   - Start cefepime and oral metronidazole based on susceptibilities    -Hold warfarin for OR today, will resume on 11/15/24 per surgery  -PTA methadone 5mg QID for chronic pain  - Acute pain mgmt:   - Acetaminophen PRN   - IV Dilaudid 0.5 - 1.0 mg q2h PRN for breakthrough/severe pain   - PO Dilaudid 2 mg q3h PRN   - Spiritual consult      Type II diabetes mellitus, insulin-dependent  Diabetic neuropathy  CKD 3  Last A1c 9.9 in  3/1/2024, 8.2 on admission. Managed by outpatient endocrinology, last seen 4/2024. Follows with wound clinic regularly for RLE diabetic ulcers. BG  with limited PO intake s/p amputation.    - Hold Tresiba (PTA dose in 34) and consider restarting at 28 units  every morning as PO intake increases   - Medium sliding scale insulin     Chronic pain syndrome  Reports she is taking methadone at home, however dispense report does not show any methadone dispensed since 07/2024.  - PTA methadone 5mg QID     HFpEF  Paroxysmal atrial fibrillation  Last visit with cardiology was at A-fib clinic on 9/16/2022, placed on warfarin given [id number] = 7 (>75, F, CHF, HTN, vasc dz, DM). INR 1.73 on admission. No rate control medications. CHF found in chart on recent admission in 07/28/24, no GDMT medications. Echo on 11/13 with EF 60%, no severe valvular disease, mild biatrial enlargement.  - Resume warfarin on 11/15  - Continue PTA metolazone and torsemide  - Test claim for DOAC      Chronic conditions:  Chronic normocytic anemia: at baseline of 9-10 on admission.  HTN: normotensive off antihypertensives  HLD: PTA statin  Lymphedema: PTA torsemide        Diet: Advance Diet as Tolerated: Regular Diet Adult; Moderate Consistent Carb (60 g CHO per Meal) Diet; 2 gm NA Diet    DVT Prophylaxis: Warfarin  Braga Catheter: Not present  Fluids: PO  Lines: None     Cardiac Monitoring: None  Code Status: Full Code      Disposition Plan      Expected Discharge Date: 11/17/2024      Destination: nursing home          The patient's care was discussed with the Attending Physician, Dr. Jimmy Ann MD .    GAMA VENCES MD JD. PGY-2   Hospitalist Service  [facility]  Securely message with [employer] (more info)  Text page via [employer] Paging/Directory   ______________________________________________________________________    Interval History   Overnight, pt required add'l acute pain management and responded well to PO dilaudid 2 mg q3h. This morning, pt in good spirits and reported pain well-controlled. Pt's daughter, Lu, called while in the room and placed on speaker-phone. Answered their questions. Pt interested in spiritual health services consult, as she identifies as  Rastafari.     Physical Exam   Vital Signs: Temp: 97.2  F (36.2  C) Temp src: Oral BP: 103/60 Pulse: 92   Resp: 20 SpO2: 95 % O2 Device: None (Room air) Oxygen Delivery: 2 LPM  Weight: 212 lbs 6.4 oz    GENERAL: No acute distress. Hopeful.   HEENT: No scleral icterus or conjunctival injection.    SKIN: Warm and dry. No bruises, rashes, or skin lesions on exposed skin.  LUNGS: No increased work of breathing, symmetrical chest rise during respiration, able to speak in full sentences without resting   CARDIAC: No cyanosis  ABDOMEN: Non-distended and soft  EXTREMITIES: LLE amputated mid-leg and new RLE BKA dressed with small serosanguinous drainage, moves all limbs spontaneously        Data     I have personally reviewed the following data over the past 24 hrs:    5.9  \   8.9 (L)   / 299     136 97 (L) 16.8 /  132 (H)   4.0 29 0.87 \       Imaging results reviewed over the past 24 hrs:   No results found for this or any previous visit (from the past 24 hours).

## 2024-11-15 NOTE — PROGRESS NOTES
Vascular Surgery Brief Note    Called to discuss patient with bedside nurse.   Reports patient had uncontrolled pain at start of shift, medicine team increased dilaudid dosing with improvement.   Noted to have some mild strikethrough on dressing per nurse, no new stikethrough after placement of chux underneath leg.   Denies chest pain, shortness of breath, no acute complaints.     - OK to reinforce dressing for mild strikethrough with ABD and ACE.   - check am labs  - please do not hesitate to reach out if questions or concerns.     Brennon Nicholson MD   Vascular Surgery PGY4  Pager [phone]  To reach [facility] vascular surgery team on call, please go to [facility], and then find   the below in the drop down menu. Please page fellow first.

## 2024-11-16 VITALS
OXYGEN SATURATION: 96 % | BODY MASS INDEX: 35.4 KG/M2 | RESPIRATION RATE: 18 BRPM | TEMPERATURE: 97.6 F | HEART RATE: 75 BPM | HEIGHT: 66 IN | WEIGHT: 220.24 LBS | DIASTOLIC BLOOD PRESSURE: 67 MMHG | SYSTOLIC BLOOD PRESSURE: 120 MMHG

## 2024-11-16 LAB
BACTERIA WND CULT: ABNORMAL
CREAT SERPL-MCNC: 0.85 MG/DL (ref 0.51–0.95)
EGFRCR SERPLBLD CKD-EPI 2021: 69 ML/MIN/1.73M2
GLUCOSE BLDC GLUCOMTR-MCNC: 137 MG/DL (ref 70–99)
GLUCOSE BLDC GLUCOMTR-MCNC: 177 MG/DL (ref 70–99)
GLUCOSE BLDC GLUCOMTR-MCNC: 257 MG/DL (ref 70–99)
GLUCOSE BLDC GLUCOMTR-MCNC: 279 MG/DL (ref 70–99)
GRAM STAIN RESULT: ABNORMAL
HOLD SPECIMEN: NORMAL

## 2024-11-16 PROCEDURE — 250N000013 HC RX MED GY IP 250 OP 250 PS 637: Performed by: PAIN MEDICINE

## 2024-11-16 PROCEDURE — 250N000011 HC RX IP 250 OP 636: Performed by: INTERNAL MEDICINE

## 2024-11-16 PROCEDURE — 250N000013 HC RX MED GY IP 250 OP 250 PS 637

## 2024-11-16 PROCEDURE — 999N000127 HC STATISTIC PERIPHERAL IV START W US GUIDANCE

## 2024-11-16 PROCEDURE — 82565 ASSAY OF CREATININE: CPT | Performed by: FAMILY MEDICINE

## 2024-11-16 PROCEDURE — 120N000001 HC R&B MED SURG/OB

## 2024-11-16 PROCEDURE — 250N000013 HC RX MED GY IP 250 OP 250 PS 637: Performed by: INTERNAL MEDICINE

## 2024-11-16 PROCEDURE — 250N000011 HC RX IP 250 OP 636: Performed by: FAMILY MEDICINE

## 2024-11-16 PROCEDURE — 99232 SBSQ HOSP IP/OBS MODERATE 35: CPT | Performed by: INTERNAL MEDICINE

## 2024-11-16 PROCEDURE — 36415 COLL VENOUS BLD VENIPUNCTURE: CPT | Performed by: FAMILY MEDICINE

## 2024-11-16 PROCEDURE — 99232 SBSQ HOSP IP/OBS MODERATE 35: CPT | Mod: GC | Performed by: FAMILY MEDICINE

## 2024-11-16 RX ADMIN — ACETAMINOPHEN 975 MG: 325 TABLET ORAL at 21:42

## 2024-11-16 RX ADMIN — HYDROMORPHONE HYDROCHLORIDE 4 MG: 2 TABLET ORAL at 02:55

## 2024-11-16 RX ADMIN — PREGABALIN 25 MG: 25 CAPSULE ORAL at 21:41

## 2024-11-16 RX ADMIN — METRONIDAZOLE 500 MG: 500 TABLET, FILM COATED ORAL at 16:06

## 2024-11-16 RX ADMIN — ATORVASTATIN CALCIUM 40 MG: 40 TABLET, FILM COATED ORAL at 21:40

## 2024-11-16 RX ADMIN — POTASSIUM CHLORIDE 20 MEQ: 1500 TABLET, EXTENDED RELEASE ORAL at 21:39

## 2024-11-16 RX ADMIN — HYDROMORPHONE HYDROCHLORIDE 2 MG: 2 TABLET ORAL at 16:06

## 2024-11-16 RX ADMIN — CEFEPIME 2 G: 2 INJECTION, POWDER, FOR SOLUTION INTRAVENOUS at 17:17

## 2024-11-16 RX ADMIN — APIXABAN 5 MG: 5 TABLET, FILM COATED ORAL at 09:26

## 2024-11-16 RX ADMIN — ACETAMINOPHEN 975 MG: 325 TABLET ORAL at 16:05

## 2024-11-16 RX ADMIN — METHADONE HYDROCHLORIDE 5 MG: 5 TABLET ORAL at 16:06

## 2024-11-16 RX ADMIN — TORSEMIDE 60 MG: 20 TABLET ORAL at 09:25

## 2024-11-16 RX ADMIN — HYDROMORPHONE HYDROCHLORIDE 2 MG: 2 TABLET ORAL at 09:26

## 2024-11-16 RX ADMIN — Medication 1250 MG: at 17:18

## 2024-11-16 RX ADMIN — METHADONE HYDROCHLORIDE 5 MG: 5 TABLET ORAL at 09:25

## 2024-11-16 RX ADMIN — CEFEPIME 2 G: 2 INJECTION, POWDER, FOR SOLUTION INTRAVENOUS at 09:27

## 2024-11-16 RX ADMIN — SENNOSIDES 2 TABLET: 8.6 TABLET, FILM COATED ORAL at 09:26

## 2024-11-16 RX ADMIN — METHADONE HYDROCHLORIDE 5 MG: 5 TABLET ORAL at 21:41

## 2024-11-16 RX ADMIN — APIXABAN 5 MG: 5 TABLET, FILM COATED ORAL at 21:43

## 2024-11-16 RX ADMIN — METRONIDAZOLE 500 MG: 500 TABLET, FILM COATED ORAL at 09:25

## 2024-11-16 RX ADMIN — CEFEPIME 2 G: 2 INJECTION, POWDER, FOR SOLUTION INTRAVENOUS at 02:02

## 2024-11-16 RX ADMIN — SENNOSIDES 2 TABLET: 8.6 TABLET, FILM COATED ORAL at 21:42

## 2024-11-16 RX ADMIN — ACETAMINOPHEN 975 MG: 325 TABLET ORAL at 09:26

## 2024-11-16 RX ADMIN — METRONIDAZOLE 500 MG: 500 TABLET, FILM COATED ORAL at 21:40

## 2024-11-16 RX ADMIN — POTASSIUM CHLORIDE 20 MEQ: 1500 TABLET, EXTENDED RELEASE ORAL at 09:25

## 2024-11-16 NOTE — PLAN OF CARE
Goal Outcome Evaluation:    Problem: Adult Inpatient Plan of Care  Goal: Plan of Care Review  Description: The Plan of Care Review/Shift note should be completed every shift.  The Outcome Evaluation is a brief statement about your assessment that the patient is improving, declining, or no change.  This information will be displayed automatically on your shift  note.  Outcome: Progressing  Flowsheets (Taken 11/15/2024 1801)  Plan of Care Reviewed With: patient  Overall Patient Progress: improving   VSS on RA. Pt is alert and oriented. Calls appropriately for needs. Not oob, refuses repositioning, turns assist of 1.  Pt tolerating 60 grab carb diet. IV saline locked. ACHS glucose checks. Voids spontaneously via purewick. Pain controlled with oral and IV medications. Alarms on.

## 2024-11-16 NOTE — DISCHARGE INSTRUCTIONS
"WOC DISCHARGE INSTRUCTIONS:  Negative pressure wound therapy plan:  Wound location: R calf and BKA   Change Days: Every 5 days by WOC RN  (M/F one week, W the next - alternate)  Supplies (including all accessories) used:  Black foam , Cavilon no sting barrier film, and mepitel or urgotul - optimal dressing is the peel and stick dressing from solventum in L for the calf.   Cleanse with Vashe prior to replacing NPWT  Suction setting: -125mmHg  Methods used: Window paned all periwound skin with vac drape prior to applying sponge    Staff RN to assess integrity of dressing and ensure suction is set at appropriate level every shift.   Date canister. Chart canister output every shift. Change cannister weekly and PRN if full/occluded     Remove foam dressing and replace with BID normal saline moist gauze dressing if:   -a dressing failure which cannot be repaired within 2 hours   -patient is discharging to home without a home pump   -patient is discharging to a facility outside the local area   -if a dressing is a \"Silver Foam\", remove before Radiation Therapy or MRI     The hospital VAC pump is not to be discharged with the patient. Please disconnect the patient from the machine prior to discharge.  If a home VAC pump has been delivered, connect the home cannister to dressing tubing and the cannister to the home pump, turn on home pump  If the patient is transferring to a nearby facility with a VAC, the tubing can be disconnected, clamp tubing and cover the end with a glove, then can be reconnected if within 2 hours  If transfer will be longer than 2 hours, dressing must be removed and placed with a wet to moist gauze dressing for transfer    Buttocks  Apply critic aid barrier cream (black top) to open skin around coccyx  Once soiled, gently wipe away soiled critic aid barrier cream (black top) only, do not scrub all off  Apply additional critic aid barrier cream (black top) to keep skin covered and protected  Apply BID + " PRN after each sreekanth cares

## 2024-11-16 NOTE — PLAN OF CARE
Goal Outcome Evaluation:    Problem: Adult Inpatient Plan of Care  Goal: Plan of Care Review  Description: The Plan of Care Review/Shift note should be completed every shift.  The Outcome Evaluation is a brief statement about your assessment that the patient is improving, declining, or no change.  This information will be displayed automatically on your shift  note.  Outcome: Progressing  Flowsheets (Taken 11/16/2024 9089)  Plan of Care Reviewed With: patient  Overall Patient Progress: improving   VSS on RA. Pt is alert and oriented. Calls appropriately for needs. Assist of 1 turning in bed but not oob.  Pt tolerating 60 g carb diet. IV abx running. ACHS glucose checks. Voids spontaneously via purewick. Pain is much better today and controlled with tylenol and prn oral dilaudid. Alarms on. Contact precautions maintained.

## 2024-11-16 NOTE — PROGRESS NOTES
[facility]    Progress Note - Hospitalist Service       Date of Admission:  11/12/2024    Assessment & Plan   Linda Loredo is a 79 year old female admitted on 11/12/2024, for cellulitis of a R-sided diabetic foot ulcer with BKA performed on 11/14. PMHx of T2DM on Tresiba, paroxysmal A-fib, CHF, fibromyalgia, HLD, HTN, anemia of chronic disease, and lymphedema.     Cellulitis of diabetic foot ulcer with necrosis of the bone and underlying osteomyelitis  Pressure ulcer of the right heel, stage 4  S/p BKA, right leg   Regularly follows with wound care clinic. Vanc/Zosyn started on admission. MRI right foot and ankle revealed osteomyelitis of the calcaneus, metatarsals 3, 4, 5, and lateral cuneiform.  ABIs not obtained due to wounds; however, digital pressure suggested poor wound healing potential. Right BKA performed on 11/14. BMP largely normal and Hgb 8.9. Multiple bacteria present in wound culture, and antibiotics adjusted based on same. S/p zosyn 11/12-11/15. Acute pain managed with PO and IV dilaudid.   - Vascular surgery consult, appreciate expertise   - Podiatry signed off    -ID consult, appreciate expertise   - Continue vancomycin (11/12 - )   - Continue cefepime and oral metronidazole based on susceptibilities (11/15- )  - will follow cultures  - continue these through weekend then reassess abx plan next week  -PTA methadone 5mg QID for chronic pain  -Acute pain mgmt:              - Acetaminophen PRN   - Lyrica 25mg at bedtime (increase to BID on 11/17)              - Discontinued IV Dilaudid 0.2 mg PRN              - PO Dilaudid 2 mg q3h PRN    - Follow up with pain specialist in 6-9 weeks Capri Cabrera NP   - Spiritual consult      Type II diabetes mellitus, insulin-dependent  Diabetic neuropathy  CKD 3  Last A1c 9.9 in  3/1/2024, 8.2 on admission. Managed by outpatient endocrinology, last seen 4/2024. Home regimen is Tresiba 34U QAM. PO intake is now  increasing s/p R BKA, and BG has been 130's-200's.  - Resume PTA Tresiba 10U QAM  - Medium sliding scale insulin     HFpEF  Paroxysmal atrial fibrillation  Last visit with cardiology was at A-fib clinic on 9/16/2022, placed on warfarin given [id number] = 7 (>75, F, CHF, HTN, vasc dz, DM). INR 1.73 on admission. No rate control medications. CHF found in chart on recent admission in 07/28/24, no GDMT medications. Echo on 11/13 with EF 60%, no severe valvular disease, mild biatrial enlargement. DOAC is covered 100% on her insurance thus warfarin was discontinued on 11/15.  - Apixaban 5mg BID  - Continue PTA metolazone and torsemide     Chronic conditions:  Chronic normocytic anemia: at baseline of 9-10 on admission. Post-op hgb 8.9.  Chronic pain syndrome: PTA methadone 5mg QID  HTN: normotensive off antihypertensives  HLD: PTA statin  Lymphedema: PTA torsemide        Diet: Advance Diet as Tolerated: Regular Diet Adult; Moderate Consistent Carb (60 g CHO per Meal) Diet; 2 gm NA Diet  Snacks/Supplements Adult: Glucerna; Between Meals  Snacks/Supplements Adult: Expedite Bottle; With Meals    DVT Prophylaxis: DOAC  Braga Catheter: Not present  Fluids: PO  Lines: None     Cardiac Monitoring: None  Code Status: Full Code         Disposition Plan     Medically Ready for Discharge: Anticipated in 2-4 Days     The patient's care was discussed with the Attending Physician, Dr. Finley .    Dana Ann MD  Hospitalist Service  [facility]  Securely message with [employer] (more info)  Text page via [employer] Paging/Directory   ______________________________________________________________________    Interval History   No acute events overnight.  Patient had very severe pain with recent dressing changes and difficulty tolerating wound VAC, however she states that her pain is much better today.  She is tearful when talking about her loss to limb, but very grateful for the care she has received here and for the  support of her daughter and son-in-law.    Physical Exam   Vital Signs: Temp: 97.2  F (36.2  C) Temp src: Oral BP: 122/87 Pulse: 63   Resp: 18 SpO2: 95 % O2 Device: None (Room air)    Weight: 220 lbs 3.83 oz    GENERAL: No acute distress.  Tearful.  HEENT: No scleral icterus or conjunctival injection. Oral cavity moist and pink.  SKIN: Warm and dry. No bruises, rashes, or skin lesions.  LUNGS: Normal work of breathing with no use of accessory muscles. Clear breath sounds in all lung fields bilaterally with no wheezes or crackles appreciated.  CARDIAC: Regular rate and irregularly irregular rhythm, normal S1 and S2, no S3 or S4, and no murmur noted  ABDOMEN: Non-distended. Soft and non-tender throughout with no masses or organomegaly.  EXTREMITIES: LLE and new RLE BKA, wound VAC in place.    Data     I have personally reviewed the following data over the past 24 hrs:    N/A  \   N/A   / N/A     N/A N/A N/A /  137 (H)   N/A N/A 0.85 \

## 2024-11-16 NOTE — PROGRESS NOTES
"Infectious Diseases Progress Note  [facility]    Date of visit: 11/16/2024       ASSESSMENT   79-year-old woman with a history of diabetes, A-fib, CHF, fibromyalgia, hyperlipidemia, hypertension who is admitted with right foot infection.    Right foot osteomyelitis.  Presented to podiatry clinic for the first time yesterday with chronic right lower extremity wounds.  Admitted to the hospital for IV antibiotics.  MRI showing osteomyelitis in the posterior calcaneus, fifth metatarsal, and third and fourth metatarsal base.  Patient's foot is felt to be nonsalvageable.  Wound cultures are polymicrobial, with anaerobic growth. Vascular surgery performed guillotine amputation on 11/14. Active issue.   History of left BKA  Chronic lymphedema and venostasis ulcers on lower ext     Active Problems:    Cellulitis of right lower extremity       PLAN   -continue vancomycin   -continue cefepime and metronidazole based on susceptibilities  -will follow cultures  -continue these through weekend then reassess abx plan next week  -ID will follow intermittently, please call with questions or change in clinical status.       Cammie Nixon MD  [facility]  Direct messaging: [email or web address] Paging  On-Call ID provider: [phone], option: [phone]      ===========================================      SUBJECTIVE / INTERVAL HISTORY:     First visit by me. Tolerating antibiotics. Discussed micro. Leg painful.     Antibiotics   Vancomycin 11/12-  Zosyn 11/12-    Previous:  None      Physical Exam     Temp:  [97.2  F (36.2  C)-97.4  F (36.3  C)] 97.2  F (36.2  C)  Pulse:  [87-98] 87  Resp:  [18-22] 18  BP: (103-123)/(60-72) 123/70  SpO2:  [95 %-99 %] 99 %    /70 (BP Location: Right arm)   Pulse 87   Temp 97.2  F (36.2  C) (Oral)   Resp 18   Ht 1.676 m (5' 6\")   Wt 99.9 kg (220 lb 3.8 oz)   SpO2 99%   BMI 35.55 kg/m      GENERAL:  well-developed, well-nourished, lying in bed in no acute distress.   HENT:  " Head is normocephalic, atraumatic.   EYES:  Eyes have anicteric sclerae without conjunctival injection   LUNGS:  normal respiratory pattern   EXT: Status post left BKA.  Right leg wrapped, s/p foot amputation. Vac in place.   SKIN:  No acute rashes.   NEUROLOGIC:  Grossly nonfocal.      Cultures   11/12 right foot wound: GPC's; culture: Providencia spp, pseudomonas, S.aureus, E.faecalis, and P.mirabilis  11/12 blood culture: No growth to date    Susceptibility data from last 90 days.  Collected Specimen Info Organism Ampicillin Ampicillin/Sulbactam Cefepime Ceftazidime Ceftriaxone Ciprofloxacin Clindamycin Daptomycin Doxycycline Erythromycin Gentamicin Gentamicin Synergy Levofloxacin Linezolid   11/12/24 Wound from Foot, Right Pseudomonas aeruginosa    S  S   R        R      Providencia rettgeri R  I  S  S  S  S      S   I      Proteus mirabilis  S  S  S  S  S  S      S   S      Enterococcus faecalis  S            R       Staphylococcus aureus MRSA       R  S  I  I  S    S     Schaalia (Actinomyces) species                 11/12/24 Wound from Foot, Right Mixed Aerobic and Anaerobic danny                   Bacteroides ovatus/xylanisolvens                   Collected Specimen Info Organism Meropenem Oxacillin Piperacillin/Tazobactam Tetracycline Tobramycin Trimethoprim/Sulfamethoxazole  Vancomycin   11/12/24 Wound from Foot, Right Pseudomonas aeruginosa  S     S       Providencia rettgeri  S   R   S  S      Proteus mirabilis  S   S   S  S      Enterococcus faecalis        S     Staphylococcus aureus MRSA   R   R   S  S     Schaalia (Actinomyces) species          11/12/24 Wound from Foot, Right Mixed Aerobic and Anaerobic danny            Bacteroides ovatus/xylanisolvens               Pertinent Labs:     Recent Labs   Lab 11/15/24  0812 11/14/24  0748 11/13/24  0150   WBC 5.9 5.4 7.5   HGB 8.9* 9.0* 8.8*    278 279       Recent Labs   Lab 11/15/24  0812 11/14/24  0748 11/13/24  0150    139 139   CO2 29 30*  33*   BUN 16.8 21.3 23.3*   ALBUMIN 2.8*  --   --        Recent Labs   Lab 24  1438   CRPI 61.50*           Imaging:     Echocardiogram Complete    Result Date: 2024  [id number]^KATYA^[place] [address]  Name: ALEXIA BURNS MRN: [id number] : 1945 Study Date: 2024 09:58 AM Age: 79 yrs Gender: Female Patient Location: [facility] Reason For Study: Atrial Fibrillation Ordering Physician: RANDAL ALDANA Performed By: ARTHUR  BSA: 2.1 m2 Height: 66 in Weight: 230 lb HR: 74 BP: 103/58 mmHg ______________________________________________________________________________ Procedure Complete Portable Echo Adult. Definity (NDC #91136-540) given intravenously. Technically difficult study. ______________________________________________________________________________ Interpretation Summary  1. Normal left ventricular size and systolic performance with a visually estimated ejection fraction of 60%. 2. No significant valvular heart disease is identified on this study. 3. Mild right ventricular enlargement with normal right ventricular systolic performance. 4. There is mild biatrial enlargement. ______________________________________________________________________________ Left ventricle: Normal left ventricular size and systolic performance with a visually estimated ejection fraction of 60%. There is normal regional wall motion. There is borderline concentric increase in left ventricular wall thickness.  Assessment of LV Diastolic Function: Patient appears to be in atrial fibrillation which limits assessment of diastolic filling.  Right ventricle: Mild right ventricular enlargement with normal right ventricular systolic performance.  Left atrium: There is mild left atrial enlargement.  Right atrium: There is mild right atrial enlargement.  IVC: The IVC is borderline dilated.  Aortic valve: The aortic valve is comprised of three  cusps. No significant aortic stenosis or aortic insufficiency is detected on this study.  Mitral valve: The mitral valve appears morphologically normal. There is mild mitral insufficiency.  Tricuspid valve: The tricuspid valve is grossly morphologically normal. There is mild tricuspid insufficiency.  Pulmonic valve: The pulmonic valve is grossly morphologically normal.  Thoracic aorta: The aortic root and proximal ascending aorta are of normal dimension.  Pericardium: There is no significant pericardial effusion. ______________________________________________________________________________ ______________________________________________________________________________ MMode/2D Measurements & Calculations IVSd: 1.2 cm LVIDd: 4.6 cm LVIDs: 3.4 cm LVPWd: 1.2 cm FS: 26.3 % LV mass(C)d: 200.8 grams LV mass(C)dI: 94.6 grams/m2 Ao root diam: 3.0 cm asc Aorta Diam: 3.6 cm LVOT diam: 2.0 cm LVOT area: 3.2 cm2 Ao root diam index Ht(cm/m): 1.8 Ao root diam index BSA (cm/m2): 1.4 Asc Ao diam index BSA (cm/m2): 1.7 Asc Ao diam index Ht(cm/m): 2.2 RWT: 0.52  Doppler Measurements & Calculations TR max ray: 213.1 cm/sec TR max P.2 mmHg RV S Ray: 13.5 cm/sec  ______________________________________________________________________________ Report approved by: Giovana Rodríguez 2024 11:04 AM       MR Ankle Right w/o Contrast    Result Date: 2024  EXAM: MR ANKLE RIGHT W/O CONTRAST, MR FOOT RIGHT W/O CONTRAST LOCATION: [facility] DATE: 2024 INDICATION: Right foot and ankle infection. COMPARISON: None available. TECHNIQUE: Unenhanced. FINDINGS: Study degraded by motion, particularly patient pain. IV access additionally failed, precluding the administration of intravenous contrast. There is a large ulceration at the posterior inferior aspect of the heel. This extends deep to abut the posteroinferior calcaneus, which demonstrates a shallow cortical erosion. Marrow edema extends anteriorly  from this erosion into the mid calcaneus and  is compatible with acute osteomyelitis. There is an additional ulceration along the lateral aspect of the forefoot, which extends deep to abut the fifth metatarsal base.. There is associated cortical erosion of the adjacent fifth metatarsal base, with diffuse marrow edema of the fifth metatarsal compatible with acute osteomyelitis. Additional marrow edema within the cuboid, lateral cuneiform, and the third and fourth metatarsal bases is also consistent with acute osteomyelitis. There is also the appearance of marrow edema within the fifth toe, though this may be artifactual. No evidence of osteonecrosis or abscess formation, within limitation of the noncontrast technique. There is some notable undermining associated with the distal ulceration at the lateral forefoot, which extends dorsally along the distal fifth metatarsal. There is diffuse, superficial soft tissue swelling throughout the foot and ankle, which could be a chewable to a combination of bland edema and cellulitis. No evidence of fracture, stress fracture, or avascular necrosis. No evidence of abscess. Subacute on chronic denervation of the intrinsic foot musculature, though some degree of edema within the intrinsic musculature at the lateral forefoot/midfoot could be secondary to a superimposed myositis. Foot and ankle tendons appear normal in course and caliber. Plantar fascia appears intact.     IMPRESSION: Acute osteomyelitis of the posterior calcaneus, fifth metatarsal, third and fourth metatarsal bases, cuboid, and lateral cuneiform, as detailed above. No evidence of abscess or osteonecrosis.     MR Foot Right w/o Contrast    Result Date: 11/13/2024  EXAM: MR ANKLE RIGHT W/O CONTRAST, MR FOOT RIGHT W/O CONTRAST LOCATION: [facility] DATE: 11/13/2024 INDICATION: Right foot and ankle infection. COMPARISON: None available. TECHNIQUE: Unenhanced. FINDINGS: Study degraded by motion,  particularly patient pain. IV access additionally failed, precluding the administration of intravenous contrast. There is a large ulceration at the posterior inferior aspect of the heel. This extends deep to abut the posteroinferior calcaneus, which demonstrates a shallow cortical erosion. Marrow edema extends anteriorly from this erosion into the mid calcaneus and  is compatible with acute osteomyelitis. There is an additional ulceration along the lateral aspect of the forefoot, which extends deep to abut the fifth metatarsal base.. There is associated cortical erosion of the adjacent fifth metatarsal base, with diffuse marrow edema of the fifth metatarsal compatible with acute osteomyelitis. Additional marrow edema within the cuboid, lateral cuneiform, and the third and fourth metatarsal bases is also consistent with acute osteomyelitis. There is also the appearance of marrow edema within the fifth toe, though this may be artifactual. No evidence of osteonecrosis or abscess formation, within limitation of the noncontrast technique. There is some notable undermining associated with the distal ulceration at the lateral forefoot, which extends dorsally along the distal fifth metatarsal. There is diffuse, superficial soft tissue swelling throughout the foot and ankle, which could be a chewable to a combination of bland edema and cellulitis. No evidence of fracture, stress fracture, or avascular necrosis. No evidence of abscess. Subacute on chronic denervation of the intrinsic foot musculature, though some degree of edema within the intrinsic musculature at the lateral forefoot/midfoot could be secondary to a superimposed myositis. Foot and ankle tendons appear normal in course and caliber. Plantar fascia appears intact.     IMPRESSION: Acute osteomyelitis of the posterior calcaneus, fifth metatarsal, third and fourth metatarsal bases, cuboid, and lateral cuneiform, as detailed above. No evidence of abscess or  osteonecrosis.     US ALMA Doppler No Exercise 1-2 Levels Right    Result Date: 11/12/2024  EXAM: US LOWER EXTREMITY ARTERIAL RT, US ALMA DOPPLER NO EXERCISE, 1-2 LEVELS, RIGHT LOCATION: [facility] DATE: 11/12/2024 INDICATION: Eval PAD COMPARISON: None. TECHNIQUE: Duplex utilizing 2D gray-scale imaging, Doppler interrogation with color-flow and spectral waveform analysis. ALMA FINDINGS: RIGHT                                               Brachial: -- Ankle (PT): Unable to be obtained due to open wounds. Ankle (DP): Unable to be obtained due to open wounds. Digit: 26 Index: 0.21   LEFT Brachial: 123 Ankle (PT): Amputation. Ankle (DP): Amputation. Digit: Amputation. RIGHT LOWER EXTREMITY ARTERIAL ASSESSMENT: External iliac artery: 135 cm/s Common femoral artery: 129 cm/s Profunda femoris artery: 80 cm/s SFA (proximal): 130 cm/s SFA (mid): 105 cm/s SFA (distal): 195 cm/s Popliteal artery: 181-182 cm/s Anterior tibial artery: Unable to be obtained due to open wounds. Posterior tibial artery: Unable to be obtained due to open wounds. Dorsalis pedis artery: Unable to be obtained due to open wounds.      IMPRESSION: 1.  RIGHT LOWER EXTREMITY: ALMA is unable to be obtained due to wounds. The digital pressure is 26 mmHg which suggests poor wound healing potential. The duplex study reveals a large amount of arterial calcification. Monophasic waveforms are seen within the external iliac and common femoral artery suggesting a pelvic inflow stenosis. Brisk monophasic waveforms to the level of the popliteal artery without an additional identified high-grade stenosis. The below-knee arteries are not evaluated due to open wounds. 2.  LEFT LOWER EXTREMITY: Amputation.    US Lower Extremity Arterial rt    Result Date: 11/12/2024  EXAM: US LOWER EXTREMITY ARTERIAL RT, US ALMA DOPPLER NO EXERCISE, 1-2 LEVELS, RIGHT LOCATION: [facility] DATE: 11/12/2024 INDICATION: Eval PAD COMPARISON:  None. TECHNIQUE: Duplex utilizing 2D gray-scale imaging, Doppler interrogation with color-flow and spectral waveform analysis. ALMA FINDINGS: RIGHT                                               Brachial: -- Ankle (PT): Unable to be obtained due to open wounds. Ankle (DP): Unable to be obtained due to open wounds. Digit: 26 Index: 0.21   LEFT Brachial: 123 Ankle (PT): Amputation. Ankle (DP): Amputation. Digit: Amputation. RIGHT LOWER EXTREMITY ARTERIAL ASSESSMENT: External iliac artery: 135 cm/s Common femoral artery: 129 cm/s Profunda femoris artery: 80 cm/s SFA (proximal): 130 cm/s SFA (mid): 105 cm/s SFA (distal): 195 cm/s Popliteal artery: 181-182 cm/s Anterior tibial artery: Unable to be obtained due to open wounds. Posterior tibial artery: Unable to be obtained due to open wounds. Dorsalis pedis artery: Unable to be obtained due to open wounds.      IMPRESSION: 1.  RIGHT LOWER EXTREMITY: ALMA is unable to be obtained due to wounds. The digital pressure is 26 mmHg which suggests poor wound healing potential. The duplex study reveals a large amount of arterial calcification. Monophasic waveforms are seen within the external iliac and common femoral artery suggesting a pelvic inflow stenosis. Brisk monophasic waveforms to the level of the popliteal artery without an additional identified high-grade stenosis. The below-knee arteries are not evaluated due to open wounds. 2.  LEFT LOWER EXTREMITY: Amputation.         Data reviewed today: I reviewed all medications, new labs and imaging results over the last 24 hours. I personally reviewed no images or EKG's today.  The patient's care was discussed with the Patient and Patient's Family.

## 2024-11-16 NOTE — PLAN OF CARE
"Problem: Adult Inpatient Plan of Care  Goal: Plan of Care Review  Description: The Plan of Care Review/Shift note should be completed every shift.  The Outcome Evaluation is a brief statement about your assessment that the patient is improving, declining, or no change.  This information will be displayed automatically on your shift  note.  Outcome: Progressing  Goal: Patient-Specific Goal (Individualized)  Description: You can add care plan individualizations to a care plan. Examples of Individualization might be:  \"Parent requests to be called daily at 9am for status\", \"I have a hard time hearing out of my right ear\", or \"Do not touch me to wake me up as it startles  me\".  Outcome: Progressing  Goal: Absence of Hospital-Acquired Illness or Injury  Outcome: Progressing  Intervention: Identify and Manage Fall Risk  Recent Flowsheet Documentation  Taken 11/15/2024 2100 by Leni Concepcion RN  Safety Promotion/Fall Prevention: safety round/check completed  Intervention: Prevent Skin Injury  Recent Flowsheet Documentation  Taken 11/15/2024 2100 by Leni Concepcion RN  Body Position: position maintained  Goal: Optimal Comfort and Wellbeing  Outcome: Progressing  Goal: Readiness for Transition of Care  Outcome: Progressing     Problem: Skin Injury Risk Increased  Goal: Skin Health and Integrity  Outcome: Progressing  Intervention: Optimize Skin Protection  Recent Flowsheet Documentation  Taken 11/16/2024 0023 by Leni Concepcion RN  Activity Management: bedrest  Taken 11/15/2024 2100 by Leni Concepcion RN  Activity Management: bedrest     Problem: Skin or Soft Tissue Infection  Goal: Absence of Infection Signs and Symptoms  Outcome: Progressing     Problem: Pain Acute  Goal: Optimal Pain Control and Function  Outcome: Progressing  Intervention: Prevent or Manage Pain  Recent Flowsheet Documentation  Taken 11/15/2024 2100 by Leni Concepcion RN  Bowel Elimination Promotion: adequate fluid intake promoted  Medication " "Review/Management: medications reviewed     Goal Outcome Evaluation:  Pt A&O x4; RA; Pt was mostly calm and cooperative but at times becomes agitated; Pain ranged from 6-9 out of 10, provided PRN pain meds; IV access became infiltrated with some redness and leakage, IV was removed and new one was placed at the right antecubital fossa; Purewick in place; No BM since admission, Pt stated last BM was a \"few days ago;\" PRN senna administered; Activity: wheel chair at baseline. Transfers independently. Has been bedrest since admission; VSS & Afebrile.      Makes needs known. Call light within reach. Bed at lowest position. Bed alarm on.     Pt request: keep lights on, door, and curtains open at all times.     -Leni BARROS RN     "

## 2024-11-17 LAB
BACTERIA BLD CULT: NO GROWTH
CREAT SERPL-MCNC: 0.76 MG/DL (ref 0.51–0.95)
EGFRCR SERPLBLD CKD-EPI 2021: 79 ML/MIN/1.73M2
GLUCOSE BLDC GLUCOMTR-MCNC: 166 MG/DL (ref 70–99)
GLUCOSE BLDC GLUCOMTR-MCNC: 214 MG/DL (ref 70–99)
GLUCOSE BLDC GLUCOMTR-MCNC: 220 MG/DL (ref 70–99)
GLUCOSE BLDC GLUCOMTR-MCNC: 239 MG/DL (ref 70–99)
GLUCOSE BLDC GLUCOMTR-MCNC: 247 MG/DL (ref 70–99)
HOLD SPECIMEN: NORMAL

## 2024-11-17 PROCEDURE — 250N000013 HC RX MED GY IP 250 OP 250 PS 637: Performed by: PAIN MEDICINE

## 2024-11-17 PROCEDURE — 250N000011 HC RX IP 250 OP 636: Performed by: FAMILY MEDICINE

## 2024-11-17 PROCEDURE — 250N000013 HC RX MED GY IP 250 OP 250 PS 637

## 2024-11-17 PROCEDURE — 36415 COLL VENOUS BLD VENIPUNCTURE: CPT | Performed by: FAMILY MEDICINE

## 2024-11-17 PROCEDURE — 250N000013 HC RX MED GY IP 250 OP 250 PS 637: Performed by: INTERNAL MEDICINE

## 2024-11-17 PROCEDURE — 82565 ASSAY OF CREATININE: CPT | Performed by: FAMILY MEDICINE

## 2024-11-17 PROCEDURE — 120N000001 HC R&B MED SURG/OB

## 2024-11-17 PROCEDURE — 99232 SBSQ HOSP IP/OBS MODERATE 35: CPT | Mod: GC

## 2024-11-17 PROCEDURE — 250N000013 HC RX MED GY IP 250 OP 250 PS 637: Performed by: DIETITIAN, REGISTERED

## 2024-11-17 PROCEDURE — 250N000011 HC RX IP 250 OP 636: Performed by: INTERNAL MEDICINE

## 2024-11-17 RX ADMIN — INSULIN DEGLUDEC 10 UNITS: 100 INJECTION, SOLUTION SUBCUTANEOUS at 08:50

## 2024-11-17 RX ADMIN — ACETAMINOPHEN 975 MG: 325 TABLET ORAL at 13:45

## 2024-11-17 RX ADMIN — APIXABAN 5 MG: 5 TABLET, FILM COATED ORAL at 21:34

## 2024-11-17 RX ADMIN — CEFEPIME 2 G: 2 INJECTION, POWDER, FOR SOLUTION INTRAVENOUS at 01:50

## 2024-11-17 RX ADMIN — CEFEPIME 2 G: 2 INJECTION, POWDER, FOR SOLUTION INTRAVENOUS at 17:42

## 2024-11-17 RX ADMIN — METHADONE HYDROCHLORIDE 5 MG: 5 TABLET ORAL at 15:56

## 2024-11-17 RX ADMIN — TORSEMIDE 60 MG: 20 TABLET ORAL at 08:48

## 2024-11-17 RX ADMIN — METHADONE HYDROCHLORIDE 5 MG: 5 TABLET ORAL at 12:17

## 2024-11-17 RX ADMIN — POTASSIUM CHLORIDE 20 MEQ: 1500 TABLET, EXTENDED RELEASE ORAL at 08:48

## 2024-11-17 RX ADMIN — PREGABALIN 25 MG: 25 CAPSULE ORAL at 21:33

## 2024-11-17 RX ADMIN — CEFEPIME 2 G: 2 INJECTION, POWDER, FOR SOLUTION INTRAVENOUS at 09:57

## 2024-11-17 RX ADMIN — METHADONE HYDROCHLORIDE 5 MG: 5 TABLET ORAL at 21:34

## 2024-11-17 RX ADMIN — METRONIDAZOLE 500 MG: 500 TABLET, FILM COATED ORAL at 13:45

## 2024-11-17 RX ADMIN — ACETAMINOPHEN 975 MG: 325 TABLET ORAL at 21:33

## 2024-11-17 RX ADMIN — APIXABAN 5 MG: 5 TABLET, FILM COATED ORAL at 08:48

## 2024-11-17 RX ADMIN — METRONIDAZOLE 500 MG: 500 TABLET, FILM COATED ORAL at 21:33

## 2024-11-17 RX ADMIN — METRONIDAZOLE 500 MG: 500 TABLET, FILM COATED ORAL at 08:48

## 2024-11-17 RX ADMIN — HYDROMORPHONE HYDROCHLORIDE 4 MG: 2 TABLET ORAL at 19:37

## 2024-11-17 RX ADMIN — METHADONE HYDROCHLORIDE 5 MG: 5 TABLET ORAL at 08:48

## 2024-11-17 RX ADMIN — ATORVASTATIN CALCIUM 40 MG: 40 TABLET, FILM COATED ORAL at 21:34

## 2024-11-17 RX ADMIN — SENNOSIDES 2 TABLET: 8.6 TABLET, FILM COATED ORAL at 21:33

## 2024-11-17 RX ADMIN — ACETAMINOPHEN 975 MG: 325 TABLET ORAL at 08:48

## 2024-11-17 RX ADMIN — SENNOSIDES 2 TABLET: 8.6 TABLET, FILM COATED ORAL at 08:48

## 2024-11-17 RX ADMIN — Medication 1250 MG: at 15:57

## 2024-11-17 RX ADMIN — POTASSIUM CHLORIDE 20 MEQ: 1500 TABLET, EXTENDED RELEASE ORAL at 21:33

## 2024-11-17 NOTE — PLAN OF CARE
"Problem: Adult Inpatient Plan of Care  Goal: Plan of Care Review  Description: The Plan of Care Review/Shift note should be completed every shift.  The Outcome Evaluation is a brief statement about your assessment that the patient is improving, declining, or no change.  This information will be displayed automatically on your shift  note.  Outcome: Progressing  Goal: Patient-Specific Goal (Individualized)  Description: You can add care plan individualizations to a care plan. Examples of Individualization might be:  \"Parent requests to be called daily at 9am for status\", \"I have a hard time hearing out of my right ear\", or \"Do not touch me to wake me up as it startles  me\".  Outcome: Progressing  Goal: Absence of Hospital-Acquired Illness or Injury  Outcome: Progressing  Intervention: Identify and Manage Fall Risk  Recent Flowsheet Documentation  Taken 11/16/2024 2140 by Leni Concepcion RN  Safety Promotion/Fall Prevention: safety round/check completed  Intervention: Prevent Skin Injury  Recent Flowsheet Documentation  Taken 11/16/2024 2140 by Leni Concepcion RN  Body Position: position maintained  Intervention: Prevent Infection  Recent Flowsheet Documentation  Taken 11/16/2024 2140 by Leni Concepcion RN  Infection Prevention:   rest/sleep promoted   single patient room provided   hand hygiene promoted  Goal: Optimal Comfort and Wellbeing  Outcome: Progressing  Goal: Readiness for Transition of Care  Outcome: Progressing     Problem: Skin Injury Risk Increased  Goal: Skin Health and Integrity  Outcome: Progressing  Intervention: Optimize Skin Protection  Recent Flowsheet Documentation  Taken 11/16/2024 2140 by Leni Concepcion RN  Head of Bed (HOB) Positioning: HOB at 45 degrees     Problem: Skin or Soft Tissue Infection  Goal: Absence of Infection Signs and Symptoms  Outcome: Progressing  Intervention: Minimize and Manage Infection Progression  Recent Flowsheet Documentation  Taken 11/16/2024 2140 by Jamey" Leni CAMPO RN  Infection Prevention:   rest/sleep promoted   single patient room provided   hand hygiene promoted  Isolation Precautions: contact precautions maintained     Problem: Pain Acute  Goal: Optimal Pain Control and Function  Outcome: Progressing  Intervention: Optimize Psychosocial Wellbeing  Recent Flowsheet Documentation  Taken 11/16/2024 2140 by Leni Concepcion RN  Diversional Activities:   reading   television  Intervention: Prevent or Manage Pain  Recent Flowsheet Documentation  Taken 11/16/2024 2140 by Leni Concepcion RN  Bowel Elimination Promotion: adequate fluid intake promoted  Medication Review/Management: medications reviewed     Goal Outcome Evaluation:   Pt A&O x4; Pt is calm and cooperative; sat with pt when she was tearful and talking about her frustration with constantly having pain; voids spontaneously with purewick; pain has been kept minimal with meds; contact precautions maintained - Leni BARROS RN

## 2024-11-17 NOTE — PLAN OF CARE
Problem: Skin or Soft Tissue Infection  Goal: Absence of Infection Signs and Symptoms  Outcome: Progressing  Intervention: Minimize and Manage Infection Progression  Recent Flowsheet Documentation  Taken 11/17/2024 1000 by Jessica Marks RN  Infection Prevention:   rest/sleep promoted   single patient room provided   hand hygiene promoted  Isolation Precautions: contact precautions maintained     Problem: Pain Acute  Goal: Optimal Pain Control and Function  Outcome: Progressing  Intervention: Optimize Psychosocial Wellbeing  Recent Flowsheet Documentation  Taken 11/17/2024 1000 by Jessica Marks RN  Diversional Activities:   reading   television  Intervention: Prevent or Manage Pain  Recent Flowsheet Documentation  Taken 11/17/2024 1000 by Jessica Marks, RN  Bowel Elimination Promotion: adequate fluid intake promoted  Medication Review/Management: medications reviewed   Goal Outcome Evaluation:  Pain controlled as evidence by pt refusing pain medications. Education provided about oral pain meds.   Pt states pain is tolerable and she will notify when pain medication is needed.     Pt turns independently.     Purwick in place.     Pt only has one prosthetic, does not wear it.     Pt coordinating with friends and family.

## 2024-11-17 NOTE — PLAN OF CARE
Goal Outcome Evaluation:       Pt A&OX4. Pain managed with scheduled medications. Wound vac was leaking, re-secured dressing. Pt weight shifting in bed. Plan for discharge to TCU tomorrow W/C ride [phone].

## 2024-11-17 NOTE — PROGRESS NOTES
Care Management Follow Up    Length of Stay (days): 5    Expected Discharge Date: 11/18/2024     Concerns to be Addressed:       Patient plan of care discussed at interdisciplinary rounds: Yes    Anticipated Discharge Disposition: Transitional Care    Anticipated Discharge Services: Transportation Services  Anticipated Discharge DME: None    Patient/family educated on Medicare website which has current facility and service quality ratings: no  Education Provided on the Discharge Plan: Yes  Patient/Family in Agreement with the Plan: yes    Referrals Placed by CM/SW:    Private pay costs discussed: Not applicable    Handoff Completed: No, handoff not indicated or clinically appropriate    Additional Information:  [phone]    New PAS Completed: [id number]    TCU updated.     Next Steps: Plan to discharge to TCU on 11/18    Amy Chaudhry, GRACESW

## 2024-11-18 ENCOUNTER — LAB REQUISITION (OUTPATIENT)
Dept: LAB | Facility: CLINIC | Age: 79
End: 2024-11-18
Payer: MEDICARE

## 2024-11-18 ENCOUNTER — DOCUMENTATION ONLY (OUTPATIENT)
Dept: ANTICOAGULATION | Facility: CLINIC | Age: 79
End: 2024-11-18
Payer: MEDICARE

## 2024-11-18 ENCOUNTER — APPOINTMENT (OUTPATIENT)
Dept: RADIOLOGY | Facility: CLINIC | Age: 79
DRG: 616 | End: 2024-11-18
Payer: MEDICARE

## 2024-11-18 VITALS
SYSTOLIC BLOOD PRESSURE: 124 MMHG | RESPIRATION RATE: 18 BRPM | TEMPERATURE: 97.4 F | HEART RATE: 93 BPM | HEIGHT: 66 IN | DIASTOLIC BLOOD PRESSURE: 64 MMHG | OXYGEN SATURATION: 97 % | WEIGHT: 226.85 LBS | BODY MASS INDEX: 36.46 KG/M2

## 2024-11-18 DIAGNOSIS — I48.20 CHRONIC ATRIAL FIBRILLATION (H): Primary | Chronic | ICD-10-CM

## 2024-11-18 DIAGNOSIS — E11.29 TYPE 2 DIABETES MELLITUS WITH OTHER DIABETIC KIDNEY COMPLICATION (H): ICD-10-CM

## 2024-11-18 LAB
CREAT SERPL-MCNC: 0.69 MG/DL (ref 0.51–0.95)
EGFRCR SERPLBLD CKD-EPI 2021: 88 ML/MIN/1.73M2
GLUCOSE BLDC GLUCOMTR-MCNC: 206 MG/DL (ref 70–99)
GLUCOSE BLDC GLUCOMTR-MCNC: 222 MG/DL (ref 70–99)
GLUCOSE BLDC GLUCOMTR-MCNC: 260 MG/DL (ref 70–99)
HOLD SPECIMEN: NORMAL
VANCOMYCIN SERPL-MCNC: 20 UG/ML

## 2024-11-18 PROCEDURE — 250N000013 HC RX MED GY IP 250 OP 250 PS 637: Performed by: INTERNAL MEDICINE

## 2024-11-18 PROCEDURE — 82565 ASSAY OF CREATININE: CPT | Performed by: FAMILY MEDICINE

## 2024-11-18 PROCEDURE — 272N000450 HC KIT 4FR POWER PICC SINGLE LUMEN

## 2024-11-18 PROCEDURE — 99232 SBSQ HOSP IP/OBS MODERATE 35: CPT | Performed by: PAIN MEDICINE

## 2024-11-18 PROCEDURE — 250N000013 HC RX MED GY IP 250 OP 250 PS 637

## 2024-11-18 PROCEDURE — 250N000011 HC RX IP 250 OP 636: Performed by: INTERNAL MEDICINE

## 2024-11-18 PROCEDURE — 36415 COLL VENOUS BLD VENIPUNCTURE: CPT | Performed by: FAMILY MEDICINE

## 2024-11-18 PROCEDURE — 36415 COLL VENOUS BLD VENIPUNCTURE: CPT | Performed by: STUDENT IN AN ORGANIZED HEALTH CARE EDUCATION/TRAINING PROGRAM

## 2024-11-18 PROCEDURE — G0463 HOSPITAL OUTPT CLINIC VISIT: HCPCS

## 2024-11-18 PROCEDURE — 80202 ASSAY OF VANCOMYCIN: CPT | Performed by: STUDENT IN AN ORGANIZED HEALTH CARE EDUCATION/TRAINING PROGRAM

## 2024-11-18 PROCEDURE — 99239 HOSP IP/OBS DSCHRG MGMT >30: CPT | Mod: 24 | Performed by: STUDENT IN AN ORGANIZED HEALTH CARE EDUCATION/TRAINING PROGRAM

## 2024-11-18 PROCEDURE — 99232 SBSQ HOSP IP/OBS MODERATE 35: CPT | Performed by: STUDENT IN AN ORGANIZED HEALTH CARE EDUCATION/TRAINING PROGRAM

## 2024-11-18 PROCEDURE — 250N000009 HC RX 250

## 2024-11-18 PROCEDURE — 36569 INSJ PICC 5 YR+ W/O IMAGING: CPT

## 2024-11-18 PROCEDURE — 272N000277 HC DEVICE 4FR SECURACATH

## 2024-11-18 PROCEDURE — 999N000065 XR CHEST PICC PLACEMENT PORT 1 VIEW

## 2024-11-18 PROCEDURE — 250N000013 HC RX MED GY IP 250 OP 250 PS 637: Performed by: PAIN MEDICINE

## 2024-11-18 RX ORDER — HYDROMORPHONE HYDROCHLORIDE 2 MG/1
2-4 TABLET ORAL
Qty: 6 TABLET | Refills: 0 | Status: SHIPPED | OUTPATIENT
Start: 2024-11-18 | End: 2024-11-18

## 2024-11-18 RX ORDER — CALCIUM CARBONATE 500 MG/1
1 TABLET, CHEWABLE ORAL 4 TIMES DAILY PRN
Qty: 90 TABLET | Refills: 0 | Status: ON HOLD | OUTPATIENT
Start: 2024-11-18

## 2024-11-18 RX ORDER — ACETAMINOPHEN 325 MG/1
975 TABLET ORAL 3 TIMES DAILY
Qty: 180 TABLET | Refills: 0 | Status: ON HOLD | OUTPATIENT
Start: 2024-11-18

## 2024-11-18 RX ORDER — CEFEPIME HYDROCHLORIDE 2 G/1
2 INJECTION, POWDER, FOR SOLUTION INTRAVENOUS EVERY 8 HOURS
Qty: 525 ML | Refills: 0 | Status: SHIPPED | OUTPATIENT
Start: 2024-11-18 | End: 2024-11-18

## 2024-11-18 RX ORDER — POLYETHYLENE GLYCOL 3350 17 G/17G
17 POWDER, FOR SOLUTION ORAL 2 TIMES DAILY PRN
Qty: 30 PACKET | Refills: 3 | Status: ON HOLD | OUTPATIENT
Start: 2024-11-18

## 2024-11-18 RX ORDER — SENNOSIDES 8.6 MG
2 TABLET ORAL 2 TIMES DAILY
Qty: 180 TABLET | Refills: 0 | Status: ON HOLD | OUTPATIENT
Start: 2024-11-18

## 2024-11-18 RX ORDER — LIDOCAINE 50 MG/G
OINTMENT TOPICAL 3 TIMES DAILY
Qty: 100 G | Refills: 0 | DISCHARGE
Start: 2024-11-18 | End: 2024-11-25

## 2024-11-18 RX ORDER — HYDROMORPHONE HYDROCHLORIDE 2 MG/1
2-4 TABLET ORAL
Qty: 20 TABLET | Refills: 0 | Status: SHIPPED | OUTPATIENT
Start: 2024-11-18 | End: 2024-11-18

## 2024-11-18 RX ORDER — AMOXICILLIN 250 MG
1 CAPSULE ORAL 2 TIMES DAILY PRN
Qty: 60 TABLET | Refills: 0 | Status: ON HOLD | OUTPATIENT
Start: 2024-11-18

## 2024-11-18 RX ORDER — METRONIDAZOLE 500 MG/1
500 TABLET ORAL 3 TIMES DAILY
Qty: 42 TABLET | Refills: 0 | Status: SHIPPED | OUTPATIENT
Start: 2024-11-18 | End: 2024-11-18

## 2024-11-18 RX ORDER — HYDROMORPHONE HYDROCHLORIDE 4 MG/1
4 TABLET ORAL ONCE
Status: COMPLETED | OUTPATIENT
Start: 2024-11-18 | End: 2024-11-18

## 2024-11-18 RX ORDER — CEFEPIME HYDROCHLORIDE 2 G/1
2 INJECTION, POWDER, FOR SOLUTION INTRAVENOUS EVERY 8 HOURS
Status: ON HOLD | COMMUNITY
Start: 2024-11-18 | End: 2024-12-09

## 2024-11-18 RX ORDER — ONDANSETRON 4 MG/1
4 TABLET, ORALLY DISINTEGRATING ORAL EVERY 6 HOURS PRN
Qty: 30 TABLET | Refills: 0 | Status: ON HOLD | OUTPATIENT
Start: 2024-11-18

## 2024-11-18 RX ORDER — METRONIDAZOLE 500 MG/1
500 TABLET ORAL 3 TIMES DAILY
Qty: 42 TABLET | Refills: 0 | Status: ON HOLD | OUTPATIENT
Start: 2024-11-18

## 2024-11-18 RX ORDER — LIDOCAINE 50 MG/G
OINTMENT TOPICAL 3 TIMES DAILY
Status: DISCONTINUED | OUTPATIENT
Start: 2024-11-18 | End: 2024-11-18 | Stop reason: HOSPADM

## 2024-11-18 RX ORDER — PREGABALIN 25 MG/1
25 CAPSULE ORAL 2 TIMES DAILY
Qty: 60 CAPSULE | Refills: 0 | Status: ON HOLD | OUTPATIENT
Start: 2024-11-18

## 2024-11-18 RX ORDER — METHADONE HYDROCHLORIDE 5 MG/1
5 TABLET ORAL 4 TIMES DAILY
Qty: 120 TABLET | Refills: 0 | Status: ON HOLD | OUTPATIENT
Start: 2024-11-18

## 2024-11-18 RX ORDER — HYDROMORPHONE HYDROCHLORIDE 2 MG/1
2-4 TABLET ORAL
Status: ON HOLD | DISCHARGE
Start: 2024-11-18

## 2024-11-18 RX ORDER — PREGABALIN 25 MG/1
25 CAPSULE ORAL 2 TIMES DAILY
Qty: 60 CAPSULE | Refills: 0 | Status: SHIPPED | OUTPATIENT
Start: 2024-11-18 | End: 2024-11-18

## 2024-11-18 RX ADMIN — ACETAMINOPHEN 975 MG: 325 TABLET ORAL at 08:42

## 2024-11-18 RX ADMIN — METRONIDAZOLE 500 MG: 500 TABLET, FILM COATED ORAL at 08:42

## 2024-11-18 RX ADMIN — POTASSIUM CHLORIDE 20 MEQ: 1500 TABLET, EXTENDED RELEASE ORAL at 08:42

## 2024-11-18 RX ADMIN — CEFEPIME 2 G: 2 INJECTION, POWDER, FOR SOLUTION INTRAVENOUS at 10:18

## 2024-11-18 RX ADMIN — CEFEPIME 2 G: 2 INJECTION, POWDER, FOR SOLUTION INTRAVENOUS at 02:12

## 2024-11-18 RX ADMIN — LIDOCAINE HYDROCHLORIDE 2.5 ML: 10 INJECTION, SOLUTION INFILTRATION; PERINEURAL at 12:55

## 2024-11-18 RX ADMIN — MICONAZOLE NITRATE: 2 POWDER TOPICAL at 08:47

## 2024-11-18 RX ADMIN — METHADONE HYDROCHLORIDE 5 MG: 5 TABLET ORAL at 12:25

## 2024-11-18 RX ADMIN — TORSEMIDE 60 MG: 20 TABLET ORAL at 08:41

## 2024-11-18 RX ADMIN — HYDROMORPHONE HYDROCHLORIDE 4 MG: 4 TABLET ORAL at 14:20

## 2024-11-18 RX ADMIN — METHADONE HYDROCHLORIDE 5 MG: 5 TABLET ORAL at 08:42

## 2024-11-18 RX ADMIN — APIXABAN 5 MG: 5 TABLET, FILM COATED ORAL at 08:42

## 2024-11-18 RX ADMIN — INSULIN DEGLUDEC 10 UNITS: 100 INJECTION, SOLUTION SUBCUTANEOUS at 08:44

## 2024-11-18 ASSESSMENT — ACTIVITIES OF DAILY LIVING (ADL)
ADLS_ACUITY_SCORE: 0

## 2024-11-18 NOTE — CONSULTS
[facility] Nurse Inpatient Assessment     Consulted for: R bka vac placement; new consult 11/18 for buttocks    Summary: patient has severe pain. Wound wrapping from OR had to be removed, then a 45 minute break for additional pain medication, then the vac was able to be placed.     Patient History (according to provider note(s):      Linda Loredo is a 79 year old female who was admitted on 11/12/2024.  I was asked by Dr. Nicholson to see the patient for pain secondary to amputation, in the setting of methadone use. Admitted for cellulitis with diabetic foot ulcer and necrosis of the bone and stage IV pressure ulcer. History of DM2, chronic neuropathy, CKD, chronic pain, heart failure.    shows Dilaudid refills over the last 2 months, routinely filling 90 tablets of methadone 5 mg. Describes pain as 5/10 and worse with dressing changes.  Allergy noted to morphine. In the last 24 hours Pat utilized 20mg methadone, 6.5mg IV dilaudid and 4mg po dilaudid.      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   Given allodynia suggest adding nerve membrane stabilizing agent Lyrica.  Also could try 1 dose of ketamine.  Although use all agents with great caution given advanced age and high baseline MME.  Multimodal Medication Therapy:   Adjuvants: add scheduled tylenol, add lyrica 25mg at HS (increase to BID on Sunday)  Opioids: Continue PRN dilaudid 2mg PO Q3h PRN (may require 4mg dose)  PRN IV dilaudid 0.2mg IV prn   Methadone 5mg QID per home dose - QtC 390  Non-medication interventions- Ice   Constipation Prophylaxis-add senna  Follow up /Discharge Recommendations - We recommend prescribing the following at the time of discharge:  likely qid methadone and PO dilaudid.   Follow up with pain specialist in 6-9 weeks Capri Cabrera NP     Assessment:      11/18: vac still  intact and functioning, to be changed W. Some fibrinous liquid noted under one area of drape.   Negative pressure wound therapy applied to: NELDA       11/15    Last photo: 11/15   Wound due to: Surgical Wound   Wound history/plan of care:    Surgical date: 11/14   Service following: Vascular  Date Negative Pressure Wound Therapy initiated: 11/15   Interventions in place: offloading  Is patient s nutritional status compromised? no   If yes, what interventions are in place? Protein supplements  Reason for initiating vac therapy? High risk of infections  Which?of?the?following?co-morbidities?apply? Diabetes  If diabetic is patient on a diabetic management program? Yes   Is osteomyelitis present in wound? no   If yes what treatments are in place? N/A    Wound base: Calf 100 % Dermis, BKA 10% bone; 90 % Adipose tissue/muscle     Palpation of the wound bed: normal       Drainage: small      Volume in cannister: 0     Last cannister change date: 11/15     Description of drainage: bloody      Measurements (length x width x depth, in cm) Calf 22  x 15  x  0.2 cm  ; BKA 10 x 9 x 1 cm     Tunneling N/A      Undermining N/A   Periwound skin: Intact       Color: pink       Temperature: warm   Odor: mild   Pain: severe, Irritable or crying out at intervals, crying inconsolably, tension to hands, feet and body, facial expression of distress, and during dressing change, shooting and constant   Pain intervention prior to dressing change: slow and gentle cares ; break in middle of treatment; IV and PO pain medications, see MAR  Treatment goal: Infection control/prevention, Protection, and Promote epidermal migration  STATUS: initial assessment   Supplies ordered: gathered    Number of foam pieces removed from a wound (excluding foam for bridge) :  0 GranuFoam Black and 0 urgotul    Verified this matched the number of foam pieces applied last dressing change: Yes   Number of foam pieces packed into wound (excluding foam for bridge) :   1 GranuFoam Black and 1 urgotul  to BKA; 1 large peel and place vac dressing to calf  ____________________________________________________________________________________________________________________________________________________________________________________________________________________________________    Skin Injury Location: buttocks    11/18    Last photo: 11/18  Skin injury due to: Moisture associated skin damage (MASD)  Skin history and plan of care:   ***  Affected area:      Skin assessment: {skin injury assessment:869595}     Measurements (length x width x depth, in cm) ***  x ***  x  *** cm      Color: {PERIWOUND COLOR:590944}     Temperature  {WOUND TEMPERATURE:602951}     Drainage: {DRAINAGE AMOUNT:866539} .      Color: {DRAINAGE DESCRIPTION:854523}      Odor: {ODOR:356199}  Pain: {PAIN LEVEL WOC:243408}, {pain assessment wo:028843}  Pain interventions prior to dressing change: {[facility] pain intervention:403653}  Treatment goal: {[facility] treatment goals:059635}  STATUS: {WOUND STATUS:298036}  Supplies ordered: {[facility] supplies ordered:912770}      Treatment Plan:       Buttocks  Apply critic aid barrier cream (black top) to open skin around coccyx  Once soiled, gently wipe away soiled critic aid barrier cream (black top) only, do not scrub all off  Apply additional critic aid barrier cream (black top) to keep skin covered and protected  Apply BID + PRN after each sreekanth cares    Negative pressure wound therapy plan:  Wound location: R calf and BKA   Change Days:  Every 5 days  by WOC RN    Supplies (including all accessories) used:  Black foam , Cavilon no sting barrier film, and mepitel or urgotul  Cleanse with Vashe prior to replacing NPWT  Suction setting: -100   Methods used: Window paned all periwound skin with vac drape prior to applying sponge    Staff RN to assess integrity of dressing and ensure suction is set at appropriate level every shift.   Date canister. Chart canister output every shift.  "Change cannister weekly and PRN if full/occluded     Remove foam dressing and replace with BID normal saline moist gauze dressing if:   -a dressing failure which cannot be repaired within 2 hours   -patient is discharging to home without a home pump   -patient is discharging to a facility outside the local area   -if a dressing is a \"Silver Foam\", remove before Radiation Therapy or MRI     The hospital VAC pump is not to be discharged with the patient. Please disconnect the patient from the machine prior to discharge.  If a home VAC pump has been delivered, connect the home cannister to dressing tubing and the cannister to the home pump, turn on home pump  If the patient is transferring to a nearby facility with a VAC, the tubing can be disconnected, clamp tubing and cover the end with a glove, then can be reconnected if within 2 hours  If transfer will be longer than 2 hours, dressing must be removed and placed with a wet to moist gauze dressing for transfer        Orders: Written    RECOMMEND PRIMARY TEAM ORDER: None, at this time  Education provided: plan of care, wound progress, and Off-loading pressure  Discussed plan of care with: Patient, Family, and Nurse  WOC nurse follow-up plan: twice weekly  Notify WOC if wound(s) deteriorate.  Nursing to notify the Provider(s) and re-consult the WOC Nurse if new skin concern.    DATA:     Current support surface: Standard  Standard gel mattress (Isoflex)  Containment of urine/stool: Incontinence Protocol  BMI: Body mass index is 36.62 kg/m .   Active diet order: Orders Placed This Encounter      Regular Diet Adult      Diet     Output: I/O last 3 completed shifts:  In: 1560 [P.O.:1460; I.V.:100]  Out: 4425 [Urine:4250; Drains:175]     Labs:   Recent Labs   Lab 11/15/24  0812 11/14/24  0748 11/13/24  0150 11/12/24  1438   ALBUMIN 2.8*  --   --   --    HGB 8.9*   < > 8.8* 9.8*   INR  --   --  1.73* 1.69*   WBC 5.9   < > 7.5 9.4   A1C  --   --   --  8.2*    < > = values in " this interval not displayed.     Pressure injury risk assessment:   Sensory Perception: 2-->very limited  Moisture: 3-->occasionally moist  Activity: 2-->chairfast  Mobility: 2-->very limited  Nutrition: 3-->adequate  Friction and Shear: 2-->potential problem  Suresh Score: 14    ZAMZAM Lindsay RN CWOCN  Pager no longer in use, please contact through [employer] group: [facility] [employer] [facility]

## 2024-11-18 NOTE — DISCHARGE SUMMARY
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  Type 2 diabetes mellitus, insulin-dependent  Diabetic neuropathy  CKD 3  Paroxysmal atrial fibrillation  HFpEF    Clinically Significant Risk Factors     # DMII: A1C = 8.2 % (Ref range: <5.7 %) within past 6 months  # Obesity: Estimated body mass index is 36.62 kg/m  as calculated from the following:    Height as of this encounter: 1.676 m (5' 6\").    Weight as of this encounter: 102.9 kg (226 lb 13.7 oz).       Follow-ups Needed After Discharge   Follow-up Appointments       Follow Up and recommended labs and tests      Follow up with Nursing home physician.            Ensure adequate pain control with wound dressing changes.     Unresulted Labs Ordered in the Past 30 Days of this Admission       Date and Time Order Name Status Description    11/14/2024  1:45 PM Surgical Pathology Exam In process         These results will be followed up by PCP.    Discharge Disposition   Discharged to short-term care facility  Condition at discharge: Stable    Hospital Course   Linda Loredo was admitted on 11/12/2024 for cellulitis of a R-sided diabetic foot ulcer with BKA performed on 11/14.  The following problems were addressed during her hospitalization:    Cellulitis of diabetic foot ulcer with necrosis of the bone and underlying osteomyelitis  Pressure ulcer of the right heel, stage 4  S/p BKA, right leg   MRI right foot and ankle revealed osteomyelitis of the calcaneus, metatarsals 3, 4, 5, and lateral cuneiform. ABIs not obtained due to wounds; however, digital pressure suggested poor wound healing potential. Right BKA performed on 11/14. Multiple " bacteria present in wound culture, and antibiotics adjusted.  Acute pain managed with PO dilaudid, PRN Tylenol, BID Lyrica. Chronic pain methadone continued throughout admission. Antibiotics on discharge to include IV cefepime, IV vancomycin, and PO metronidazole for 14 days or until surgery is able to close BKA wound site.      Type II diabetes mellitus, insulin-dependent  Diabetic neuropathy  Last A1c 9.9 in 3/1/2024, 8.2 on admission. Managed by outpatient endocrinology, last seen 4/2024. Home regimen is Tresiba 34U QAM. PO intake is now increasing s/p R BKA, and BG have been 160s-260s following surgery. Discussed need for optimal glucose control for proper wound healing, will restart home Tresiba dose upon discharge.     HFpEF  Paroxysmal atrial fibrillation  Last visit with cardiology was at [facility]-fib clinic on 9/16/2022, placed on warfarin given [id number] = 7 (>75, F, CHF, HTN, vasc dz, DM). INR 1.73 on admission. No rate control medications. CHF found in chart on recent admission in 07/28/24, no GDMT medications. Echo on 11/13 with EF 60%, no severe valvular disease, mild biatrial enlargement. DOAC is covered 100% on her insurance thus warfarin was discontinued on 11/15. Began apixaban 5 mg BID during this stay without difficulty.  Of note, apixaban can increase the metabolism of methadone, and patient may require an increase in methadone dosage for continued chronic pain control.     Consultations This Hospital Stay   PHARMACY TO DOSE VANCO  PHARMACY TO DOSE VANCO  VASCULAR SURGERY IP CONSULT  INFECTIOUS DISEASES IP CONSULT  PODIATRY IP CONSULT  CARE MANAGEMENT / SOCIAL WORK IP CONSULT  SPIRITUAL HEALTH SERVICES IP CONSULT  WOUND OSTOMY CONTINENCE NURSE  IP CONSULT  SPIRITUAL HEALTH SERVICES IP CONSULT  PHARMACY TEST CLAIM IP CONSULT  PAIN MANAGEMENT ADULT IP CONSULT  PAIN MANAGEMENT ADULT IP CONSULT  CARE MANAGEMENT / SOCIAL WORK IP CONSULT  NUTRITION SERVICES ADULT IP CONSULT  WOUND OSTOMY CONTINENCE NURSE   IP CONSULT  VASCULAR ACCESS ADULT IP CONSULT  PHYSICAL THERAPY ADULT IP CONSULT  OCCUPATIONAL THERAPY ADULT IP CONSULT    Code Status   Full Code       The patient was discussed with Dr. Sofia Hogan MD.    Elsa Lopes MD  Hospitalist Service  [address] [address]  Phone: [phone]  Fax: [phone]  ______________________________________________________________________    Physical Exam   Vital Signs: Temp: 97.4  F (36.3  C) Temp src: Oral BP: 124/64 Pulse: 93   Resp: 18 SpO2: 97 % O2 Device: None (Room air)    Weight: 226 lbs 13.65 oz    Physical Exam  Constitutional:       Appearance: Normal appearance.      Comments: Intermittently tearful when discussing pain associated with dressing changes.   Cardiovascular:      Rate and Rhythm: Normal rate. Rhythm irregular.      Pulses: Normal pulses.      Heart sounds: Normal heart sounds. No murmur heard.  Pulmonary:      Effort: Pulmonary effort is normal.      Breath sounds: Normal breath sounds.   Abdominal:      General: Abdomen is flat.      Palpations: Abdomen is soft.      Tenderness: There is no guarding or rebound.   Musculoskeletal:         General: Tenderness present. No swelling.      Comments: Right BKA site tender to palpation, no other musculoskeletal tenderness.   Skin:     Capillary Refill: Capillary refill takes less than 2 seconds.      Findings: Erythema present. No bruising.      Comments: Wound VAC present and intact at right BKA site.  No excess warmth noted.   Neurological:      Mental Status: She is alert.   Psychiatric:         Thought Content: Thought content normal.         Judgment: Judgment normal.         Primary Care Physician   [facility]    Discharge Orders      Hemoglobin A1c     General info for SNF    Length of Stay Estimate: Short Term Care: Estimated # of Days <30  Condition at Discharge: Improving  Level of care:skilled    Rehabilitation Potential: Good  Admission H&P remains valid and up-to-date: Yes  Recent Chemotherapy: N/A  Use Nursing Home Standing Orders: Yes     Mantoux instructions    Give two-step Mantoux (PPD) Per Facility Policy Yes     Follow Up and recommended labs and tests    Follow up with Nursing home physician.     Reason for your hospital stay    Diabetic foot infection and osteomyelitis R below the knee amputation     Wound care (specify)    Site:   R BKA with wound vac  Instructions:  Wound dressing changes per WOC recommendations     Glucose monitor nursing POCT    Before meals and at bedtime     Activity - Up with nursing assistance     Physical Therapy Adult Consult    Evaluate and treat as clinically indicated.    Reason:  s/p R BKA     Occupational Therapy Adult Consult    Evaluate and treat as clinically indicated.    Reason:  s/p R BKA     Fall precautions     Diet    Follow this diet upon discharge:   Orders Placed This Encounter      Snacks/Supplements Adult: Glucerna; Between Meals      Snacks/Supplements Adult: Expedite Bottle; With Meals      Regular Diet Adult       Significant Results and Procedures   Results for orders placed or performed during the hospital encounter of 11/12/24   US ALMA Doppler No Exercise 1-2 Levels Right    Narrative    EXAM: US LOWER EXTREMITY ARTERIAL RT, US ALMA DOPPLER NO EXERCISE, 1-2 LEVELS, RIGHT  LOCATION: [facility]  DATE: 11/12/2024    INDICATION: Eval PAD  COMPARISON: None.  TECHNIQUE: Duplex utilizing 2D gray-scale imaging, Doppler interrogation with color-flow and spectral waveform analysis.    ALMA FINDINGS:     RIGHT                                                 Brachial: --  Ankle (PT): Unable to be obtained due to open wounds.   Ankle (DP): Unable to be obtained due to open wounds.   Digit: 26 Index: 0.21       LEFT  Brachial: 123  Ankle (PT): Amputation.  Ankle (DP): Amputation.   Digit: Amputation.     RIGHT LOWER EXTREMITY ARTERIAL  ASSESSMENT:  External iliac artery: 135 cm/s  Common femoral artery: 129 cm/s  Profunda femoris artery: 80 cm/s  SFA (proximal): 130 cm/s  SFA (mid): 105 cm/s  SFA (distal): 195 cm/s  Popliteal artery: 181-182 cm/s  Anterior tibial artery: Unable to be obtained due to open wounds.   Posterior tibial artery: Unable to be obtained due to open wounds.   Dorsalis pedis artery: Unable to be obtained due to open wounds.        Impression    IMPRESSION:  1.  RIGHT LOWER EXTREMITY: ALMA is unable to be obtained due to wounds. The digital pressure is 26 mmHg which suggests poor wound healing potential. The duplex study reveals a large amount of arterial calcification. Monophasic waveforms are seen within   the external iliac and common femoral artery suggesting a pelvic inflow stenosis. Brisk monophasic waveforms to the level of the popliteal artery without an additional identified high-grade stenosis. The below-knee arteries are not evaluated due to open   wounds.    2.  LEFT LOWER EXTREMITY: Amputation.   US Lower Extremity Arterial rt    Narrative    EXAM: US LOWER EXTREMITY ARTERIAL RT, US ALMA DOPPLER NO EXERCISE, 1-2 LEVELS, RIGHT  LOCATION: [facility]  DATE: 11/12/2024    INDICATION: Eval PAD  COMPARISON: None.  TECHNIQUE: Duplex utilizing 2D gray-scale imaging, Doppler interrogation with color-flow and spectral waveform analysis.    ALMA FINDINGS:     RIGHT                                                 Brachial: --  Ankle (PT): Unable to be obtained due to open wounds.   Ankle (DP): Unable to be obtained due to open wounds.   Digit: 26 Index: 0.21       LEFT  Brachial: 123  Ankle (PT): Amputation.  Ankle (DP): Amputation.   Digit: Amputation.     RIGHT LOWER EXTREMITY ARTERIAL ASSESSMENT:  External iliac artery: 135 cm/s  Common femoral artery: 129 cm/s  Profunda femoris artery: 80 cm/s  SFA (proximal): 130 cm/s  SFA (mid): 105 cm/s  SFA (distal): 195 cm/s  Popliteal artery: 181-182  cm/s  Anterior tibial artery: Unable to be obtained due to open wounds.   Posterior tibial artery: Unable to be obtained due to open wounds.   Dorsalis pedis artery: Unable to be obtained due to open wounds.        Impression    IMPRESSION:  1.  RIGHT LOWER EXTREMITY: ALMA is unable to be obtained due to wounds. The digital pressure is 26 mmHg which suggests poor wound healing potential. The duplex study reveals a large amount of arterial calcification. Monophasic waveforms are seen within   the external iliac and common femoral artery suggesting a pelvic inflow stenosis. Brisk monophasic waveforms to the level of the popliteal artery without an additional identified high-grade stenosis. The below-knee arteries are not evaluated due to open   wounds.    2.  LEFT LOWER EXTREMITY: Amputation.   MR Ankle Right w/o Contrast    Narrative    EXAM: MR ANKLE RIGHT W/O CONTRAST, MR FOOT RIGHT W/O CONTRAST  LOCATION: [facility]  DATE: 11/13/2024    INDICATION: Right foot and ankle infection.  COMPARISON: None available.  TECHNIQUE: Unenhanced.    FINDINGS:     Study degraded by motion, particularly patient pain. IV access additionally failed, precluding the administration of intravenous contrast.    There is a large ulceration at the posterior inferior aspect of the heel. This extends deep to abut the posteroinferior calcaneus, which demonstrates a shallow cortical erosion. Marrow edema extends anteriorly from this erosion into the mid calcaneus and   is compatible with acute osteomyelitis.    There is an additional ulceration along the lateral aspect of the forefoot, which extends deep to abut the fifth metatarsal base.. There is associated cortical erosion of the adjacent fifth metatarsal base, with diffuse marrow edema of the fifth   metatarsal compatible with acute osteomyelitis. Additional marrow edema within the cuboid, lateral cuneiform, and the third and fourth metatarsal bases is also  consistent with acute osteomyelitis. There is also the appearance of marrow edema within the   fifth toe, though this may be artifactual.    No evidence of osteonecrosis or abscess formation, within limitation of the noncontrast technique. There is some notable undermining associated with the distal ulceration at the lateral forefoot, which extends dorsally along the distal fifth metatarsal.    There is diffuse, superficial soft tissue swelling throughout the foot and ankle, which could be a chewable to a combination of bland edema and cellulitis.    No evidence of fracture, stress fracture, or avascular necrosis. No evidence of abscess.    Subacute on chronic denervation of the intrinsic foot musculature, though some degree of edema within the intrinsic musculature at the lateral forefoot/midfoot could be secondary to a superimposed myositis.    Foot and ankle tendons appear normal in course and caliber. Plantar fascia appears intact.      Impression    IMPRESSION:    Acute osteomyelitis of the posterior calcaneus, fifth metatarsal, third and fourth metatarsal bases, cuboid, and lateral cuneiform, as detailed above. No evidence of abscess or osteonecrosis.      MR Foot Right w/o Contrast    Narrative    EXAM: MR ANKLE RIGHT W/O CONTRAST, MR FOOT RIGHT W/O CONTRAST  LOCATION: [facility]  DATE: 11/13/2024    INDICATION: Right foot and ankle infection.  COMPARISON: None available.  TECHNIQUE: Unenhanced.    FINDINGS:     Study degraded by motion, particularly patient pain. IV access additionally failed, precluding the administration of intravenous contrast.    There is a large ulceration at the posterior inferior aspect of the heel. This extends deep to abut the posteroinferior calcaneus, which demonstrates a shallow cortical erosion. Marrow edema extends anteriorly from this erosion into the mid calcaneus and   is compatible with acute osteomyelitis.    There is an additional ulceration  along the lateral aspect of the forefoot, which extends deep to abut the fifth metatarsal base.. There is associated cortical erosion of the adjacent fifth metatarsal base, with diffuse marrow edema of the fifth   metatarsal compatible with acute osteomyelitis. Additional marrow edema within the cuboid, lateral cuneiform, and the third and fourth metatarsal bases is also consistent with acute osteomyelitis. There is also the appearance of marrow edema within the   fifth toe, though this may be artifactual.    No evidence of osteonecrosis or abscess formation, within limitation of the noncontrast technique. There is some notable undermining associated with the distal ulceration at the lateral forefoot, which extends dorsally along the distal fifth metatarsal.    There is diffuse, superficial soft tissue swelling throughout the foot and ankle, which could be a chewable to a combination of bland edema and cellulitis.    No evidence of fracture, stress fracture, or avascular necrosis. No evidence of abscess.    Subacute on chronic denervation of the intrinsic foot musculature, though some degree of edema within the intrinsic musculature at the lateral forefoot/midfoot could be secondary to a superimposed myositis.    Foot and ankle tendons appear normal in course and caliber. Plantar fascia appears intact.      Impression    IMPRESSION:    Acute osteomyelitis of the posterior calcaneus, fifth metatarsal, third and fourth metatarsal bases, cuboid, and lateral cuneiform, as detailed above. No evidence of abscess or osteonecrosis.      XR PICC Chest Placement Port 1vw    Narrative    EXAM: XR CHEST PICC PLACEMENT PORT 1 VIEW  LOCATION: [facility]  DATE: 11/18/2024    INDICATION: Hx Afib. RN inserted PICC   tip verification by XR  COMPARISON: 6/10/2024      Impression    IMPRESSION: Right upper extremity PICC tip in the upper SVC. Lungs and pleural spaces are clear. Unchanged enlarged cardiac  silhouette.     Echocardiogram Complete    Narrative    [id number]  [id number]  [id number]  [id number]^KATYA^[place] [address]     Name: ALEXIA BURNS  MRN: [id number]  : 1945  Study Date: 2024 09:58 AM  Age: 79 yrs  Gender: Female  Patient Location: [facility]  Reason For Study: Atrial Fibrillation  Ordering Physician: RANDAL ALDANA  Performed By: ARTHUR     BSA: 2.1 m2  Height: 66 in  Weight: 230 lb  HR: 74  BP: 103/58 mmHg  ______________________________________________________________________________  Procedure  Complete Portable Echo Adult. Definity (NDC #72817-417) given intravenously.  Technically difficult study.  ______________________________________________________________________________  Interpretation Summary     1. Normal left ventricular size and systolic performance with a visually  estimated ejection fraction of 60%.  2. No significant valvular heart disease is identified on this study.  3. Mild right ventricular enlargement with normal right ventricular systolic  performance.  4. There is mild biatrial enlargement.  ______________________________________________________________________________  Left ventricle:  Normal left ventricular size and systolic performance with a visually  estimated ejection fraction of 60%. There is normal regional wall motion.  There is borderline concentric increase in left ventricular wall thickness.     Assessment of LV Diastolic Function: Patient appears to be in atrial  fibrillation which limits assessment of diastolic filling.     Right ventricle:  Mild right ventricular enlargement with normal right ventricular systolic  performance.     Left atrium:  There is mild left atrial enlargement.     Right atrium:  There is mild right atrial enlargement.     IVC:  The IVC is borderline dilated.     Aortic valve:  The aortic valve is comprised of three cusps. No significant aortic stenosis  or aortic  insufficiency is detected on this study.     Mitral valve:  The mitral valve appears morphologically normal. There is mild mitral  insufficiency.     Tricuspid valve:  The tricuspid valve is grossly morphologically normal. There is mild tricuspid  insufficiency.     Pulmonic valve:  The pulmonic valve is grossly morphologically normal.     Thoracic aorta:  The aortic root and proximal ascending aorta are of normal dimension.     Pericardium:  There is no significant pericardial effusion.  ______________________________________________________________________________  ______________________________________________________________________________  MMode/2D Measurements & Calculations  IVSd: 1.2 cm  LVIDd: 4.6 cm  LVIDs: 3.4 cm  LVPWd: 1.2 cm  FS: 26.3 %  LV mass(C)d: 200.8 grams  LV mass(C)dI: 94.6 grams/m2  Ao root diam: 3.0 cm  asc Aorta Diam: 3.6 cm  LVOT diam: 2.0 cm  LVOT area: 3.2 cm2  Ao root diam index Ht(cm/m): 1.8  Ao root diam index BSA (cm/m2): 1.4  Asc Ao diam index BSA (cm/m2): 1.7  Asc Ao diam index Ht(cm/m): 2.2  RWT: 0.52     Doppler Measurements & Calculations  TR max ray: 213.1 cm/sec  TR max P.2 mmHg  RV S Ray: 13.5 cm/sec     ______________________________________________________________________________  Report approved by: Giovana Rodríguez 2024 11:04 AM             Discharge Medications   Discharge Medication List as of 2024 12:34 PM        START taking these medications    Details   calcium carbonate (TUMS) 500 MG chewable tablet Take 1 tablet (500 mg) by mouth 4 times daily as needed for heartburn., Disp-90 tablet, R-0, E-Prescribe      sennosides (SENOKOT) 8.6 MG tablet Take 2 tablets by mouth 2 times daily., Disp-180 tablet, R-0, E-Prescribe      apixaban ANTICOAGULANT (ELIQUIS) 5 MG tablet Take 1 tablet (5 mg) by mouth 2 times daily., Disp-60 tablet, R-0, E-Prescribe      ceFEPIme (MAXIPIME) 2 g vial Inject 2 g over 30 minutes into the vein every 8 hours for 14 days.,  Disp-525 mL, R-0, E-Prescribe      metroNIDAZOLE (FLAGYL) 500 MG tablet Take 1 tablet (500 mg) by mouth 3 times daily for 14 days., Disp-42 tablet, R-0, E-Prescribe      pregabalin (LYRICA) 25 MG capsule Take 1 capsule (25 mg) by mouth 2 times daily., Disp-60 capsule, R-0, E-Prescribe      vancomycin (VANCOCIN) 1250 mg/250 mL IVPB Inject 1,250 mg over 90 minutes into the vein every 24 hours for 14 days.E-Prescribe, Disp-3500 mL, R-0           CONTINUE these medications which have CHANGED    Details   acetaminophen (TYLENOL) 325 MG tablet Take 3 tablets (975 mg) by mouth 3 times daily., Disp-180 tablet, R-0, E-Prescribe      ondansetron (ZOFRAN ODT) 4 MG ODT tab Take 1 tablet (4 mg) by mouth every 6 hours as needed for nausea or vomiting., Disp-30 tablet, R-0, E-Prescribe      polyethylene glycol (MIRALAX) 17 g packet Take 17 g by mouth 2 times daily as needed for constipation., Disp-30 packet, R-3, E-Prescribe      senna-docusate (SENOKOT-S/PERICOLACE) 8.6-50 MG tablet Take 1 tablet by mouth 2 times daily as needed for constipation., Disp-60 tablet, R-0, E-Prescribe      HYDROmorphone (DILAUDID) 2 MG tablet Take 1-2 tablets (2-4 mg) by mouth every 3 hours as needed for breakthrough pain., Disp-6 tablet, R-0, E-Prescribe           CONTINUE these medications which have NOT CHANGED    Details   atorvastatin (LIPITOR) 40 MG tablet Take 1 tablet (40 mg) by mouth every evening, Transitional      insulin degludec (TRESIBA) 200 UNIT/ML pen Inject 34 Units subcutaneously every morning Hold for BG < 100, Historical      melatonin 1 MG TABS tablet Take 1 tablet (1 mg) by mouth nightly as needed for sleep, Transitional      mineral oil-hydrophilic petrolatum (AQUAPHOR) external ointment Apply to left thigh wound BIDNo Print Out      torsemide (DEMADEX) 20 MG tablet Take 60 mg by mouth daily, Historical      wound support modular (EXPEDITE) LIQD bottle Take 60 mLs by mouth daily, Transitional      Zinc oxide 10 % CREA Externally  apply topically 3 times daily Apply to coccyx area once per shift after brief changesHistorical      methadone (DOLOPHINE) 5 MG tablet Take 1 tablet (5 mg) by mouth 4 times daily, Disp-120 tablet, R-0, Local Print           STOP taking these medications       Benzocaine (HURRICAINE/TOPEX) 20 % AERO spray Comments:   Reason for Stopping:         collagenase (SANTYL) 250 UNIT/GM external ointment Comments:   Reason for Stopping:         Lidocaine (LIDOCARE) 4 % Patch Comments:   Reason for Stopping:         Menthol, Topical Analgesic, (BIOFREEZE EX) Comments:   Reason for Stopping:         metolazone (ZAROXOLYN) 2.5 MG tablet Comments:   Reason for Stopping:         miconazole (MICATIN) 2 % external powder Comments:   Reason for Stopping:         Multiple Vitamin (TAB-A-YUN) TABS Comments:   Reason for Stopping:         potassium chloride ER (K-TAB) 20 MEQ CR tablet Comments:   Reason for Stopping:         WARFARIN SODIUM PO Comments:   Reason for Stopping:             Allergies   Allergies   Allergen Reactions    Prednisone Nausea and Vomiting    Morphine Nausea and Vomiting    Morphine [Fumaric Acid] Nausea and Vomiting    Nitrofurantoin Unknown     Per nursing home

## 2024-11-18 NOTE — PROGRESS NOTES
Infectious Diseases Progress Note  [facility]    Date of visit: 11/18/2024       ASSESSMENT   79-year-old woman with a history of diabetes, A-fib, CHF, fibromyalgia, hyperlipidemia, hypertension who is admitted with right foot infection.    Right foot osteomyelitis.  Presented to podiatry clinic for the first time yesterday with chronic right lower extremity wounds.  Admitted to the hospital for IV antibiotics.  MRI showing osteomyelitis in the posterior calcaneus, fifth metatarsal, and third and fourth metatarsal base.  Patient's foot is felt to be nonsalvageable.  Wound cultures are polymicrobial, with anaerobic growth. Vascular surgery performed guillotine amputation on 11/14.  Closure surgery to be completed in 2 weeks.  Given the aspect of the wound, we will continue on IV antibiotic until next surgery.  Active issue.   History of left BKA  Chronic lymphedema and venostasis ulcers on lower ext     Active Problems:    Cellulitis of right lower extremity       PLAN     Can discharge on IV vancomycin and cefepime plus p.o. Flagyl until next surgery in 2 weeks.  ID discharge orders placed for 3 weeks.  Weekly CBC with differential, BMP, CRP, vancomycin trough/AUC.  Okay to place single-lumen PICC line today.      Discussed with the patient, daughter, primary hospital medicine doctor, and nursing staff.    ID will sign off.    Franki Collins MD  [facility]  Direct messaging: [employer] Paging  On-Call ID provider: [phone], option: 9      ===========================================      SUBJECTIVE / INTERVAL HISTORY:     First visit by me. Tolerating antibiotics. Discussed micro. Leg painful.     Antibiotics   Vancomycin 11/12-  Cefepime  Flagyl    Previous:  None      Physical Exam     Temp:  [97.3  F (36.3  C)-97.5  F (36.4  C)] 97.4  F (36.3  C)  Pulse:  [] 93  Resp:  [18] 18  BP: (123-128)/(58-66) 124/64  SpO2:  [96 %-97 %] 97 %    /64 (BP Location: Right  "arm)   Pulse 93   Temp 97.4  F (36.3  C) (Oral)   Resp 18   Ht 1.676 m (5' 6\")   Wt 102.9 kg (226 lb 13.7 oz)   SpO2 97%   BMI 36.62 kg/m      GENERAL:  well-developed, well-nourished, lying in bed in no acute distress.   HENT:  Head is normocephalic, atraumatic.   EYES:  Eyes have anicteric sclerae without conjunctival injection   LUNGS:  normal respiratory pattern   EXT: Status post left BKA.  Right leg wrapped, s/p foot amputation. Vac in place.   SKIN:  No acute rashes.   NEUROLOGIC:  Grossly nonfocal.    Media Information    Cultures   11/12 right foot wound: GPC's; culture: Providencia spp, pseudomonas, S.aureus, E.faecalis, and P.mirabilis  11/12 blood culture: No growth to date    Susceptibility data from last 90 days.  Collected Specimen Info Organism Ampicillin Ampicillin/Sulbactam Cefepime Ceftazidime Ceftriaxone Ciprofloxacin Clindamycin Daptomycin Doxycycline Erythromycin Gentamicin Gentamicin Synergy Levofloxacin Linezolid   11/12/24 Wound from Foot, Right Pseudomonas aeruginosa    S  S   R        R      Providencia rettgeri R  I  S  S  S  S      S   I      Proteus mirabilis  S  S  S  S  S  S      S   S      Enterococcus faecalis  S            R       Staphylococcus aureus MRSA       R  S  I  I  S    S     Schaalia (Actinomyces) species                 11/12/24 Wound from Foot, Right Mixed Aerobic and Anaerobic danny                   Bacteroides ovatus/xylanisolvens                   Collected Specimen Info Organism Meropenem Oxacillin Piperacillin/Tazobactam Tetracycline Tobramycin Trimethoprim/Sulfamethoxazole  Vancomycin   11/12/24 Wound from Foot, Right Pseudomonas aeruginosa  S   S   S       Providencia rettgeri  S   R   S  S      Proteus mirabilis  S   S   S  S      Enterococcus faecalis        S     Staphylococcus aureus MRSA   R   R   S  S     Schaalia (Actinomyces) species          11/12/24 Wound from Foot, Right Mixed Aerobic and Anaerobic danny            Bacteroides " ovatus/xylanisolvens               Pertinent Labs:     Recent Labs   Lab 11/15/24  0812 24  0748 24  0150   WBC 5.9 5.4 7.5   HGB 8.9* 9.0* 8.8*    278 279       Recent Labs   Lab 11/15/24  0812 24  0748 24  0150    139 139   CO2 29 30* 33*   BUN 16.8 21.3 23.3*   ALBUMIN 2.8*  --   --        Recent Labs   Lab 24  1438   CRPI 61.50*           Imaging:     Echocardiogram Complete    Result Date: 2024  [id number]^KATYA^[place] [address]  Name: ALEXIA BURNS MRN: [id number] : 1945 Study Date: 2024 09:58 AM Age: 79 yrs Gender: Female Patient Location: [facility] Reason For Study: Atrial Fibrillation Ordering Physician: RANDAL ALDANA Performed By: ARTHUR  BSA: 2.1 m2 Height: 66 in Weight: 230 lb HR: 74 BP: 103/58 mmHg ______________________________________________________________________________ Procedure Complete Portable Echo Adult. Definity (NDC #42041-264) given intravenously. Technically difficult study. ______________________________________________________________________________ Interpretation Summary  1. Normal left ventricular size and systolic performance with a visually estimated ejection fraction of 60%. 2. No significant valvular heart disease is identified on this study. 3. Mild right ventricular enlargement with normal right ventricular systolic performance. 4. There is mild biatrial enlargement. ______________________________________________________________________________ Left ventricle: Normal left ventricular size and systolic performance with a visually estimated ejection fraction of 60%. There is normal regional wall motion. There is borderline concentric increase in left ventricular wall thickness.  Assessment of LV Diastolic Function: Patient appears to be in atrial fibrillation which limits assessment of diastolic filling.  Right ventricle: Mild right  ventricular enlargement with normal right ventricular systolic performance.  Left atrium: There is mild left atrial enlargement.  Right atrium: There is mild right atrial enlargement.  IVC: The IVC is borderline dilated.  Aortic valve: The aortic valve is comprised of three cusps. No significant aortic stenosis or aortic insufficiency is detected on this study.  Mitral valve: The mitral valve appears morphologically normal. There is mild mitral insufficiency.  Tricuspid valve: The tricuspid valve is grossly morphologically normal. There is mild tricuspid insufficiency.  Pulmonic valve: The pulmonic valve is grossly morphologically normal.  Thoracic aorta: The aortic root and proximal ascending aorta are of normal dimension.  Pericardium: There is no significant pericardial effusion. ______________________________________________________________________________ ______________________________________________________________________________ MMode/2D Measurements & Calculations IVSd: 1.2 cm LVIDd: 4.6 cm LVIDs: 3.4 cm LVPWd: 1.2 cm FS: 26.3 % LV mass(C)d: 200.8 grams LV mass(C)dI: 94.6 grams/m2 Ao root diam: 3.0 cm asc Aorta Diam: 3.6 cm LVOT diam: 2.0 cm LVOT area: 3.2 cm2 Ao root diam index Ht(cm/m): 1.8 Ao root diam index BSA (cm/m2): 1.4 Asc Ao diam index BSA (cm/m2): 1.7 Asc Ao diam index Ht(cm/m): 2.2 RWT: 0.52  Doppler Measurements & Calculations TR max ray: 213.1 cm/sec TR max P.2 mmHg RV S Ray: 13.5 cm/sec  ______________________________________________________________________________ Report approved by: Giovana Rodríguez 2024 11:04 AM       MR Ankle Right w/o Contrast    Result Date: 2024  EXAM: MR ANKLE RIGHT W/O CONTRAST, MR FOOT RIGHT W/O CONTRAST LOCATION: [facility] DATE: 2024 INDICATION: Right foot and ankle infection. COMPARISON: None available. TECHNIQUE: Unenhanced. FINDINGS: Study degraded by motion, particularly patient pain. IV access  additionally failed, precluding the administration of intravenous contrast. There is a large ulceration at the posterior inferior aspect of the heel. This extends deep to abut the posteroinferior calcaneus, which demonstrates a shallow cortical erosion. Marrow edema extends anteriorly from this erosion into the mid calcaneus and  is compatible with acute osteomyelitis. There is an additional ulceration along the lateral aspect of the forefoot, which extends deep to abut the fifth metatarsal base.. There is associated cortical erosion of the adjacent fifth metatarsal base, with diffuse marrow edema of the fifth metatarsal compatible with acute osteomyelitis. Additional marrow edema within the cuboid, lateral cuneiform, and the third and fourth metatarsal bases is also consistent with acute osteomyelitis. There is also the appearance of marrow edema within the fifth toe, though this may be artifactual. No evidence of osteonecrosis or abscess formation, within limitation of the noncontrast technique. There is some notable undermining associated with the distal ulceration at the lateral forefoot, which extends dorsally along the distal fifth metatarsal. There is diffuse, superficial soft tissue swelling throughout the foot and ankle, which could be a chewable to a combination of bland edema and cellulitis. No evidence of fracture, stress fracture, or avascular necrosis. No evidence of abscess. Subacute on chronic denervation of the intrinsic foot musculature, though some degree of edema within the intrinsic musculature at the lateral forefoot/midfoot could be secondary to a superimposed myositis. Foot and ankle tendons appear normal in course and caliber. Plantar fascia appears intact.     IMPRESSION: Acute osteomyelitis of the posterior calcaneus, fifth metatarsal, third and fourth metatarsal bases, cuboid, and lateral cuneiform, as detailed above. No evidence of abscess or osteonecrosis.     MR Foot Right w/o  Contrast    Result Date: 11/13/2024  EXAM: MR ANKLE RIGHT W/O CONTRAST, MR FOOT RIGHT W/O CONTRAST LOCATION: [facility] DATE: 11/13/2024 INDICATION: Right foot and ankle infection. COMPARISON: None available. TECHNIQUE: Unenhanced. FINDINGS: Study degraded by motion, particularly patient pain. IV access additionally failed, precluding the administration of intravenous contrast. There is a large ulceration at the posterior inferior aspect of the heel. This extends deep to abut the posteroinferior calcaneus, which demonstrates a shallow cortical erosion. Marrow edema extends anteriorly from this erosion into the mid calcaneus and  is compatible with acute osteomyelitis. There is an additional ulceration along the lateral aspect of the forefoot, which extends deep to abut the fifth metatarsal base.. There is associated cortical erosion of the adjacent fifth metatarsal base, with diffuse marrow edema of the fifth metatarsal compatible with acute osteomyelitis. Additional marrow edema within the cuboid, lateral cuneiform, and the third and fourth metatarsal bases is also consistent with acute osteomyelitis. There is also the appearance of marrow edema within the fifth toe, though this may be artifactual. No evidence of osteonecrosis or abscess formation, within limitation of the noncontrast technique. There is some notable undermining associated with the distal ulceration at the lateral forefoot, which extends dorsally along the distal fifth metatarsal. There is diffuse, superficial soft tissue swelling throughout the foot and ankle, which could be a chewable to a combination of bland edema and cellulitis. No evidence of fracture, stress fracture, or avascular necrosis. No evidence of abscess. Subacute on chronic denervation of the intrinsic foot musculature, though some degree of edema within the intrinsic musculature at the lateral forefoot/midfoot could be secondary to a superimposed  myositis. Foot and ankle tendons appear normal in course and caliber. Plantar fascia appears intact.     IMPRESSION: Acute osteomyelitis of the posterior calcaneus, fifth metatarsal, third and fourth metatarsal bases, cuboid, and lateral cuneiform, as detailed above. No evidence of abscess or osteonecrosis.     US ALMA Doppler No Exercise 1-2 Levels Right    Result Date: 11/12/2024  EXAM: US LOWER EXTREMITY ARTERIAL RT, US ALMA DOPPLER NO EXERCISE, 1-2 LEVELS, RIGHT LOCATION: [facility] DATE: 11/12/2024 INDICATION: Eval PAD COMPARISON: None. TECHNIQUE: Duplex utilizing 2D gray-scale imaging, Doppler interrogation with color-flow and spectral waveform analysis. ALMA FINDINGS: RIGHT                                               Brachial: -- Ankle (PT): Unable to be obtained due to open wounds. Ankle (DP): Unable to be obtained due to open wounds. Digit: 26 Index: 0.21   LEFT Brachial: 123 Ankle (PT): Amputation. Ankle (DP): Amputation. Digit: Amputation. RIGHT LOWER EXTREMITY ARTERIAL ASSESSMENT: External iliac artery: 135 cm/s Common femoral artery: 129 cm/s Profunda femoris artery: 80 cm/s SFA (proximal): 130 cm/s SFA (mid): 105 cm/s SFA (distal): 195 cm/s Popliteal artery: 181-182 cm/s Anterior tibial artery: Unable to be obtained due to open wounds. Posterior tibial artery: Unable to be obtained due to open wounds. Dorsalis pedis artery: Unable to be obtained due to open wounds.      IMPRESSION: 1.  RIGHT LOWER EXTREMITY: ALMA is unable to be obtained due to wounds. The digital pressure is 26 mmHg which suggests poor wound healing potential. The duplex study reveals a large amount of arterial calcification. Monophasic waveforms are seen within the external iliac and common femoral artery suggesting a pelvic inflow stenosis. Brisk monophasic waveforms to the level of the popliteal artery without an additional identified high-grade stenosis. The below-knee arteries are not evaluated due to open  wounds. 2.  LEFT LOWER EXTREMITY: Amputation.    US Lower Extremity Arterial rt    Result Date: 11/12/2024  EXAM: US LOWER EXTREMITY ARTERIAL RT, US ALMA DOPPLER NO EXERCISE, 1-2 LEVELS, RIGHT LOCATION: [facility] DATE: 11/12/2024 INDICATION: Eval PAD COMPARISON: None. TECHNIQUE: Duplex utilizing 2D gray-scale imaging, Doppler interrogation with color-flow and spectral waveform analysis. ALMA FINDINGS: RIGHT                                               Brachial: -- Ankle (PT): Unable to be obtained due to open wounds. Ankle (DP): Unable to be obtained due to open wounds. Digit: 26 Index: 0.21   LEFT Brachial: 123 Ankle (PT): Amputation. Ankle (DP): Amputation. Digit: Amputation. RIGHT LOWER EXTREMITY ARTERIAL ASSESSMENT: External iliac artery: 135 cm/s Common femoral artery: 129 cm/s Profunda femoris artery: 80 cm/s SFA (proximal): 130 cm/s SFA (mid): 105 cm/s SFA (distal): 195 cm/s Popliteal artery: 181-182 cm/s Anterior tibial artery: Unable to be obtained due to open wounds. Posterior tibial artery: Unable to be obtained due to open wounds. Dorsalis pedis artery: Unable to be obtained due to open wounds.      IMPRESSION: 1.  RIGHT LOWER EXTREMITY: ALMA is unable to be obtained due to wounds. The digital pressure is 26 mmHg which suggests poor wound healing potential. The duplex study reveals a large amount of arterial calcification. Monophasic waveforms are seen within the external iliac and common femoral artery suggesting a pelvic inflow stenosis. Brisk monophasic waveforms to the level of the popliteal artery without an additional identified high-grade stenosis. The below-knee arteries are not evaluated due to open wounds. 2.  LEFT LOWER EXTREMITY: Amputation.         Data reviewed today: I reviewed all medications, new labs and imaging results over the last 24 hours. I personally reviewed no images or EKG's today.  The patient's care was discussed with the Patient and Patient's  Family.

## 2024-11-18 NOTE — PROCEDURES
"PICC Line Insertion Procedure Note    Pt. Name:   Linda Loredo  MRN:          [id number]    SITE: [facility]    Procedure:     Insertion of a  SINGLE Lumen  4 fr  Bard SOLO (valved) Power PICC, Lot number [id number]    Indications: Antibiotic(s) (TCU)    Contraindications : None    Procedure Details:    Patient identified with 2 identifiers and \"Time Out\" conducted.    Central line insertion bundle followed: Hand hygiene performed prior to procedure, site cleansed with Cholraprep (CHG), hat, mask, sterile gloves, sterile gown worn, patient draped with maximum barrier head to toe drape, sterile field maintained.    The right upper arm BASILIC vein was assessed by ultrasound and found to be anechoic, compressible, patent, and of adequate size.     Lidocaine 1% 2.5 ml administered SQ to the insertion site.     Modified Seldinger Technique (MST) used for insertion, ONE attempt(s) required to access vein. Imaging during access shows the needle within the vein.     PICC was advanced through peel-away sheath.    Catheter threaded without difficulty. The tip of the catheter was positioned in the SVC. Good blood return noted.    Catheter was flushed with 20 cc normal saline.     Catheter secured with Statlock/SecurAcath, tissue adhesive (on insertion site only), Biopatch (CHG), and Tegaderm dressing applied.    The sharps that are included in the PICC insertion kit were accounted for and disposed of in the sharps container prior to breakdown of the sterile field.    CLABSI prevention brochure left at bedside.    Patient  tolerated procedure well.     Patient's primary RN notified PICC is ready for use.      Findings:    Total catheter length  43 cm, with 2 cm exposed. Mid upper arm circumference is 35 cm.       Tip placement verified by xray due to AFIB. Xray read by Dr. Melendez . Tip placement: \" Right upper extremity PICC tip in the upper SVC.\"    Comments:  None        Tyler Oreilly, MSN, RN, [facility]   Vascular " Access - [place]

## 2024-11-18 NOTE — PROGRESS NOTES
[facility]    Progress Note - Hospitalist Service       Date of Admission:  11/12/2024    Assessment & Plan   Linda Loredo is a 79 year old female admitted on 11/12/2024, for cellulitis of a R-sided diabetic foot ulcer with BKA performed on 11/14. PMHx of T2DM on Tresiba, paroxysmal A-fib, CHF, fibromyalgia, HLD, HTN, anemia of chronic disease, and lymphedema.     Cellulitis of diabetic foot ulcer with necrosis of the bone and underlying osteomyelitis  Pressure ulcer of the right heel, stage 4  S/p BKA, right leg   Regularly follows with wound care clinic. Vanc/Zosyn started on admission. MRI right foot and ankle revealed osteomyelitis of the calcaneus, metatarsals 3, 4, 5, and lateral cuneiform.  ABIs not obtained due to wounds; however, digital pressure suggested poor wound healing potential. Right BKA performed on 11/14. BMP largely normal and Hgb 8.9. Multiple bacteria present in wound culture, and antibiotics adjusted based on same. S/p zosyn 11/12-11/15. Acute pain managed with PO and IV dilaudid.   - Vascular surgery consult, appreciate expertise   - Podiatry signed off    -ID consult, appreciate expertise   - Continue vancomycin (11/12 - )   - Continue cefepime and oral metronidazole based on susceptibilities (11/15- )  - Following cultures  - Continue these through weekend then reassess abx plan next week  -PTA methadone 5mg QID for chronic pain  -Acute pain mgmt:              - Acetaminophen PRN   - Lyrica 25mg at bedtime (increase to BID on 11/17)              - Discontinued IV Dilaudid 0.2 mg PRN              - PO Dilaudid 2 mg q3h PRN    - Follow up with pain specialist in 6-9 weeks Capri Cabrera NP   - Spiritual consult      Type II diabetes mellitus, insulin-dependent  Diabetic neuropathy  CKD 3  Last A1c 9.9 in  3/1/2024, 8.2 on admission. Managed by outpatient endocrinology, last seen 4/2024. Home regimen is Tresiba 34U QAM. PO intake is now  increasing s/p R BKA, and BG has been 130's-200's.  - Resume PTA Tresiba 10U QAM  - Medium sliding scale insulin     HFpEF  Paroxysmal atrial fibrillation  Last visit with cardiology was at A-fib clinic on 9/16/2022, placed on warfarin given [id number] = 7 (>75, F, CHF, HTN, vasc dz, DM). INR 1.73 on admission. No rate control medications. CHF found in chart on recent admission in 07/28/24, no GDMT medications. Echo on 11/13 with EF 60%, no severe valvular disease, mild biatrial enlargement. DOAC is covered 100% on her insurance thus warfarin was discontinued on 11/15.  - Apixaban 5mg BID  - Continue PTA metolazone and torsemide     Chronic conditions:  Chronic normocytic anemia: at baseline of 9-10 on admission. Post-op hgb 8.9.  Chronic pain syndrome: PTA methadone 5mg QID  HTN: normotensive off antihypertensives  HLD: PTA statin  Lymphedema: PTA torsemide        Diet: Snacks/Supplements Adult: Glucerna; Between Meals  Snacks/Supplements Adult: Expedite Bottle; With Meals  Regular Diet Adult    DVT Prophylaxis: DOAC  Braga Catheter: Not present  Fluids: PO  Lines: None     Cardiac Monitoring: None  Code Status: Full Code         Disposition Plan     Medically Ready for Discharge: Anticipate 11/18/2024, to previous placement.      The patient's care was discussed with the Attending Physician, Dr. Eleuterio Finley MD .    GAMA VENCES MD MICHAELA, PGY-2   Hospitalist Service  [facility]  Securely message with [facility] (more info)  Text page via [employer] Paging/Directory   ______________________________________________________________________    Interval History   No acute events overnight. Patient in good spirits and reports pain is much more manageable now. Oral pain medications working well. Wound vac in place.     Physical Exam   Vital Signs: Temp: 97.3  F (36.3  C) Temp src: Oral BP: 123/66 Pulse: 102   Resp: 18 SpO2: 96 % O2 Device: None (Room air)    Weight: 216 lbs 11.39 oz    GENERAL:  No acute distress.  Bright/warm affect.  Easy to engage.   HEENT: No scleral icterus or conjunctival injection. Oral cavity moist and pink.  SKIN: Warm and dry. No bruises, rashes, or skin lesions.  LUNGS: Normal work of breathing with no use of accessory muscles. Clear breath sounds in all lung fields bilaterally with no wheezes or crackles appreciated.  CARDIAC: Regular rate and irregularly irregular rhythm, normal S1 and S2, no S3 or S4, and no murmur noted  ABDOMEN: Non-distended. Soft and non-tender throughout with no masses or organomegaly.  EXTREMITIES: LLE and new RLE BKA, wound VAC in place.    Data     I have personally reviewed the following data over the past 24 hrs:    N/A  \   N/A   / N/A     N/A N/A N/A /  239 (H)   N/A N/A 0.76 \

## 2024-11-18 NOTE — PROGRESS NOTES
ANTICOAGULATION  MANAGEMENT    Linda Loredo is being discharged from the [facility] Anticoagulation Management Program ([facility]).    Reason for discharge: warfarin replaced by alternate therapy, Apixaban    Anticoagulation episode resolved, ACC referral closed, and Standing order discontinued    If patient needs warfarin management in the future, please send a new referral    Lyndsey Landis RN

## 2024-11-18 NOTE — PROGRESS NOTES
ANTICOAGULATION  MANAGEMENT: Discharge Review    Linda Loredo chart reviewed for anticoagulation continuity of care    Hospital Admission on 11/12-11/18 for Pressure Ulcer right heel stage 4; 11/14/24 Amputation of right lower ext,.    Discharge disposition: TCU    [facility]  Services: Skilled Nursing  [address] [place] [address] [phone]    Results:    Recent labs: (last 7 days)     11/12/24  1438 11/13/24  0150   INR 1.69* 1.73*     Anticoagulation inpatient management:     Switched to Apixaban on 11/15/24    Anticoagulation discharge instructions:     Warfarin dosing: warfarin discontinued and Switched to Apixaban  since 11/15/24            PLAN         Will discharge patient from [facility]        Lyndsey Landis RN  11/18/2024  Anticoagulation Clinic  [facility] for routing messages: g[facility] (TCU/LTC/halfway)  [facility] patient phone line: [phone]

## 2024-11-18 NOTE — PLAN OF CARE
"  Problem: Adult Inpatient Plan of Care  Goal: Plan of Care Review  Description: The Plan of Care Review/Shift note should be completed every shift.  The Outcome Evaluation is a brief statement about your assessment that the patient is improving, declining, or no change.  This information will be displayed automatically on your shift  note.  Outcome: Progressing     Problem: Adult Inpatient Plan of Care  Goal: Patient-Specific Goal (Individualized)  Description: You can add care plan individualizations to a care plan. Examples of Individualization might be:  \"Parent requests to be called daily at 9am for status\", \"I have a hard time hearing out of my right ear\", or \"Do not touch me to wake me up as it startles  me\".  Outcome: Progressing   Goal Outcome Evaluation:       VSS on RA. Discharge to TCU to continue ABX. PICC line placed. Wound vac in place. Discharge at 1330                 "

## 2024-11-18 NOTE — PLAN OF CARE
Problem: Adult Inpatient Plan of Care  Goal: Absence of Hospital-Acquired Illness or Injury  Intervention: Prevent Skin Injury  Recent Flowsheet Documentation  Taken 11/17/2024 2133 by Radhika Marcum RN  Body Position: position changed independently     Problem: Adult Inpatient Plan of Care  Goal: Optimal Comfort and Wellbeing  Outcome: Progressing  Intervention: Monitor Pain and Promote Comfort  Recent Flowsheet Documentation  Taken 11/17/2024 2133 by Radhika Marcum, RN  Pain Management Interventions: medication (see MAR)   Goal Outcome Evaluation:         Pt is alert and oriented x4. C/o RLE pain. Dilaudid 4mg given at start of shift with good relief. 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 Requested BFM to place order for WOC consult re buttocks red and peeling. Pt refusing repositioning and skin assessment. CNA just happen to see pt's buttocks during incontinence care. Pt refused Miconazole powder. Pt stayed awake until 0230am. Potential discharge today to TCU. VSS.

## 2024-11-18 NOTE — PROGRESS NOTES
Vascular Surgery Progress Note    Patient not seen, chart evaluated this am, WOC applied vac Friday evening.   Reviewed images of wound - well appearing.     - Continue vac changes MWF   - patient will discharge with vac with plan for outpt follow-up in 2 weeks with Dr. Sanches for wound evaluation and consideration of planning for closure at that time, with A1c, will order.     Vascular surgery will sign off at this time, please do not hesitate to reach out with questions or concerns.       Brennon Nicholson MD   Vascular Surgery PGY4  To reach [facility] vascular surgery team on call, please go to [facility], and then find   the below in the drop down menu. Please page fellow first.

## 2024-11-18 NOTE — PHARMACY-VANCOMYCIN DOSING SERVICE
Pharmacy Vancomycin Note  Date of Service 2024  Patient's  1945   79 year old, female    Indication: Bone and Joint Infection and Skin and Soft Tissue Infection  Day of Therapy: 7  Current vancomycin regimen: 1250 mg IV q24h  Current vancomycin monitoring method: AUC  Current vancomycin therapeutic monitoring goal: 400-600 mg*h/L    InsightRX Prediction of Current Vancomycin Regimen    Regimen: 1250 mg IV every 24 hours.  Start time: 15:57 on 2024  Exposure target: AUC24 (range)400-600 mg/L.hr   AUC24,ss: 439 mg/L.hr  Probability of AUC24 > 400: 81 %  Ctrough,ss: 13.8 mg/L  Probability of Ctrough,ss > 20: 1 %  Probability of nephrotoxicity (Lodise COSTA ): 9 %    Current estimated CrCl = Estimated Creatinine Clearance: 80.1 mL/min (based on SCr of 0.69 mg/dL).    Creatinine for last 3 days  2024:  7:29 AM Creatinine 0.85 mg/dL  2024:  9:07 AM Creatinine 0.76 mg/dL  2024:  8:18 AM Creatinine 0.69 mg/dL    Recent Vancomycin Levels (past 3 days)  2024:  9:51 AM Vancomycin 20.0 ug/mL    Vancomycin IV Administrations (past 72 hours)                     vancomycin (VANCOCIN) 1,250 mg in 0.9% NaCl 250 mL intermittent infusion (mg) 1,250 mg New Bag 24 1557     1,250 mg New Bag 24 1718     1,250 mg New Bag 11/15/24 1637                    Nephrotoxins and other renal medications (From now, onward)      Start     Dose/Rate Route Frequency Ordered Stop    11/15/24 1600  vancomycin (VANCOCIN) 1,250 mg in 0.9% NaCl 250 mL intermittent infusion         1,250 mg  over 90 Minutes Intravenous EVERY 24 HOURS 11/15/24 1058      24 0800  torsemide (DEMADEX) tablet 60 mg        Note to Pharmacy: PTA Sig:Take 60 mg by mouth daily      60 mg Oral DAILY 24 192                 Contrast Orders - past 72 hours (72h ago, onward)      None          Interpretation of levels and current regimen:  Vancomycin level is reflective of -600  Has serum creatinine  changed greater than 50% in last 72 hours: No  Renal Function: Stable    Plan:  Continue Current Dose  Vancomycin monitoring method: AUC  Vancomycin therapeutic monitoring goal: 400-600 mg*h/L  Pharmacy will check vancomycin levels as appropriate in 1-3 Days.  Serum creatinine levels will be ordered daily for the first week of therapy and at least twice weekly for subsequent weeks.    Angela Leo RPH

## 2024-11-18 NOTE — PROGRESS NOTES
[facility] ACUTE INPATIENT PAIN SERVICE    [facility], [facility], [facility], [facility], [place]   PAIN follow up      Assessment/Plan:  Linda Loredo is a 79 year old female who was admitted on 11/12/2024.  I was asked by Dr. Nicholson to see the patient for pain secondary to amputation, in the setting of methadone use. Admitted for cellulitis with diabetic foot ulcer and necrosis of the bone and stage IV pressure ulcer. History of DM2, chronic neuropathy, CKD, chronic pain, heart failure.    shows Dilaudid refills over the last 2 months, routinely filling 90 tablets of methadone 5 mg. Describes pain as 5/10 and worse with dressing changes.  Allergy noted to morphine.  Pain controlled.      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   Given allodynia suggest adding nerve membrane stabilizing agent Lyrica.  Also could try 1 dose of ketamine.  Although use all agents with great caution given advanced age and high baseline MME.  Multimodal Medication Therapy:   Adjuvants: add scheduled tylenol, add lyrica 25mg at HS (increase to BID on Sunday)  Opioids: Continue PRN dilaudid 2mg PO Q3h PRN (may require 4mg dose)  Discontinue iv dilaudid   Methadone 5mg QID per home dose - QtC 390  Non-medication interventions- Ice   Constipation Prophylaxis-add senna  Follow up /Discharge Recommendations - We recommend prescribing the following at the time of discharge:  likely qid methadone and PO dilaudid (20 tabs).   Follow up with pain specialist in 6-9 weeks Capri Cabrera NP               Subjective:    Met with patient and daughter. Pain well controlled over the weekend. She worries about increase in pain during dressing change.  We talked about pain pre-medication prior to dressing changes. Added lidocaine cream. Discussed constipation management. Pat and her daughter recall a situation (earlier  "in November) in the TCU- where she was without pain medication (and methadone) for 22 hours. Discussed follow up with pain specialist - Viridiana at [facility].          History   Drug Use No         Tobacco Use      Smoking status: Never      Smokeless tobacco: Never        Objective:  Vital signs in last 24 hours:  B/P: 128/58, T: 97.5, P: 69, R: 18   Blood pressure 128/58, pulse 69, temperature 97.5  F (36.4  C), temperature source Oral, resp. rate 18, height 1.676 m (5' 6\"), weight 102.9 kg (226 lb 13.7 oz), SpO2 97%.        Review of Systems:   As per subjective, all others negative.    Physical Exam    General: elderly female   HEENT: Head normocephalic atraumatic, oral mucosa moist. Sclerae anicteric  CV: No chest pain  Resp: No Cough   GI:  no complaints   Skin: VAC in place   Extremities: amputations noted           Imaging:  Personally Reviewed.    Results for orders placed or performed during the hospital encounter of 11/12/24   US ALMA Doppler No Exercise 1-2 Levels Right    Impression    IMPRESSION:  1.  RIGHT LOWER EXTREMITY: ALMA is unable to be obtained due to wounds. The digital pressure is 26 mmHg which suggests poor wound healing potential. The duplex study reveals a large amount of arterial calcification. Monophasic waveforms are seen within   the external iliac and common femoral artery suggesting a pelvic inflow stenosis. Brisk monophasic waveforms to the level of the popliteal artery without an additional identified high-grade stenosis. The below-knee arteries are not evaluated due to open   wounds.    2.  LEFT LOWER EXTREMITY: Amputation.   US Lower Extremity Arterial rt    Impression    IMPRESSION:  1.  RIGHT LOWER EXTREMITY: ALMA is unable to be obtained due to wounds. The digital pressure is 26 mmHg which suggests poor wound healing potential. The duplex study reveals a large amount of arterial calcification. Monophasic waveforms are seen within   the external iliac and common femoral artery suggesting a " pelvic inflow stenosis. Brisk monophasic waveforms to the level of the popliteal artery without an additional identified high-grade stenosis. The below-knee arteries are not evaluated due to open   wounds.    2.  LEFT LOWER EXTREMITY: Amputation.   MR Ankle Right w/o Contrast    Impression    IMPRESSION:    Acute osteomyelitis of the posterior calcaneus, fifth metatarsal, third and fourth metatarsal bases, cuboid, and lateral cuneiform, as detailed above. No evidence of abscess or osteonecrosis.      MR Foot Right w/o Contrast    Impression    IMPRESSION:    Acute osteomyelitis of the posterior calcaneus, fifth metatarsal, third and fourth metatarsal bases, cuboid, and lateral cuneiform, as detailed above. No evidence of abscess or osteonecrosis.           Lab Results:  Personally Reviewed.   Last Comprehensive Metabolic Panel:  Sodium   Date Value Ref Range Status   11/15/2024 136 135 - 145 mmol/L Final   02/22/2008 140 133 - 144 mmol/L Final     Potassium   Date Value Ref Range Status   11/15/2024 4.0 3.4 - 5.3 mmol/L Final   06/28/2022 4.3 3.5 - 5.0 mmol/L Final   02/22/2008 4.5 3.4 - 5.3 mmol/L Final     Chloride   Date Value Ref Range Status   11/15/2024 97 (L) 98 - 107 mmol/L Final   06/28/2022 97 (L) 98 - 107 mmol/L Final   02/22/2008 100 94 - 109 mmol/L Final     Carbon Dioxide   Date Value Ref Range Status   02/22/2008 28 20 - 32 mmol/L Final     Carbon Dioxide (CO2)   Date Value Ref Range Status   11/15/2024 29 22 - 29 mmol/L Final   06/28/2022 29 22 - 31 mmol/L Final     Anion Gap   Date Value Ref Range Status   11/15/2024 10 7 - 15 mmol/L Final   06/28/2022 13 5 - 18 mmol/L Final   02/22/2008 11 6 - 17 mmol/L Final     Glucose   Date Value Ref Range Status   06/28/2022 73 70 - 125 mg/dL Final   02/22/2008 298 (H) 60 - 99 mg/dL Final     GLUCOSE BY METER POCT   Date Value Ref Range Status   11/18/2024 222 (H) 70 - 99 mg/dL Final     Comment:     Dr/RN Notified     Urea Nitrogen   Date Value Ref Range  "Status   11/15/2024 16.8 8.0 - 23.0 mg/dL Final   06/28/2022 42 (H) 8 - 28 mg/dL Final   02/22/2008 15 7 - 30 mg/dL Final     Creatinine   Date Value Ref Range Status   11/17/2024 0.76 0.51 - 0.95 mg/dL Final   02/22/2008 0.82 0.60 - 1.30 mg/dL Final     GFR Estimate   Date Value Ref Range Status   11/17/2024 79 >60 mL/min/1.73m2 Final     Comment:     eGFR calculated using 2021 CKD-EPI equation.   02/22/2008 75 >60 mL/min/1.7m2 Final     Calcium   Date Value Ref Range Status   11/15/2024 8.8 8.8 - 10.4 mg/dL Final     Comment:     Reference intervals for this test were updated on 7/16/2024 to reflect our healthy population more accurately. There may be differences in the flagging of prior results with similar values performed with this method. Those prior results can be interpreted in the context of the updated reference intervals.   02/22/2008 10.4 8.5 - 10.4 mg/dL Final        UA: No results found for: \"UAMP\", \"UBARB\", \"BENZODIAZEUR\", \"UCANN\", \"UCOC\", \"OPIT\", \"UPCP\"           Please see A&P for additional details of medical decision making.  MANAGEMENT DISCUSSED with the following over the past 24 hours: patient and daughter   NOTE(S)/MEDICAL RECORDS REVIEWED over the past 24 hours: medicine, WOC, nursing, and ID  Tests personally interpreted in the past 24 hours:  - xray showing picc in place  Tests REVIEWED in the past 24 hours:  - CrtCl  SUPPLEMENTAL HISTORY, in addition to the patient's history, over the past 24 hours obtained from:   - daughter  Medical complexity over the past 24 hours:  -------------------------- MODERATE RISK FOR MORBIDITY --------------------------------------------------  - Prescription DRUG MANAGEMENT performed           Tasia Perez APRN, CNS, CNP  Acute Care Pain Management  Team  Hours of pain coverage Mon-Fri 8-1600  After hours contact the primary team  [facility] (RASHID, Lacey, [place], RH)   Page via PSafe web console -Click for Vocera            "

## 2024-11-18 NOTE — PROGRESS NOTES
Care Management Discharge Note    Discharge Date: 11/18/2024       Discharge Disposition: Transitional Care    Discharge Services: Transportation Services    Discharge DME: None    Discharge Transportation: agency    Private pay costs discussed: Not applicable    Does the patient's insurance plan have a 3 day qualifying hospital stay waiver?  No    PAS Confirmation Code: [id number]  Patient/family educated on Medicare website which has current facility and service quality ratings: no    Education Provided on the Discharge Plan: Yes  Persons Notified of Discharge Plans: pt and TCU  Patient/Family in Agreement with the Plan: yes    Handoff Referral Completed: No, handoff not indicated or clinically appropriate    Additional Information:  Pt discharge to [facility] with [facility] to transport.     Pt has a ride time of [phone].     Discharge orders sent to [facility] [place].     Jayme Zazueta RN

## 2024-11-19 ENCOUNTER — MEDICAL CORRESPONDENCE (OUTPATIENT)
Dept: HEALTH INFORMATION MANAGEMENT | Facility: CLINIC | Age: 79
End: 2024-11-19

## 2024-11-19 ENCOUNTER — TELEPHONE (OUTPATIENT)
Dept: VASCULAR SURGERY | Facility: CLINIC | Age: 79
End: 2024-11-19
Payer: MEDICARE

## 2024-11-19 LAB
EST. AVERAGE GLUCOSE BLD GHB EST-MCNC: 194 MG/DL
HBA1C MFR BLD: 8.4 %

## 2024-11-19 NOTE — TELEPHONE ENCOUNTER
Spoke with Shell Villegas they are having the [facility] call us back to schedule this follow up.

## 2024-11-19 NOTE — TELEPHONE ENCOUNTER
"Patient scheduled with Dr. Sanches on 11/25 at 9am to discuss closure.     Eleuterio from [facility] asking for a call back to discuss the compression for this patient.  Dr. Morley had recommended compression at all times but the patient came back from the hospital without any compression orders.  She has since \"blown up like a balloon\".  He would like to know what Dr. Sanches and Dr. Morley would like this patient to be doing with the compression.  He plans to see the patient again first thing in the morning and is hoping for someone to advise prior to seeing her again.     [phone] direct number to Eleuterio.  Confidential voicemail, okay to leave a detailed message.    "

## 2024-11-19 NOTE — TELEPHONE ENCOUNTER
----- Message from Trini KITCHEN sent at 11/19/2024  1:54 PM CST -----  Regarding: RE: Follow Up  Yes! This is needed to discuss closure  ----- Message -----  From: Stephanie Yates  Sent: 11/19/2024   1:35 PM CST  To: [facility] Support Pool  Subject: FW: Follow Up                                    Please review and advise if this follow up is needed? Pt has a post op on 12/23 with RW that was scheduled by the surgery schedulers. Thank you Liliana  ----- Message -----  From: Trini Santos RN  Sent: 11/18/2024  12:36 PM CST  To: #  Subject: FW: Follow Up                                      ----- Message -----  From: Clover Nicholson MD  Sent: 11/18/2024  12:34 PM CST  To: [facility] Surgery Scheduling Pool; #  Subject: Follow Up                                        Hi,     Could we please set this pt up for follow up with Dr. Sanches in 2 weeks to eval for wound closure?   Will need A1c prior, placing order now.    Thanks,   Brennon

## 2024-11-20 ENCOUNTER — TELEPHONE (OUTPATIENT)
Dept: WOUND CARE | Facility: HOSPITAL | Age: 79
End: 2024-11-20
Payer: MEDICARE

## 2024-11-21 NOTE — TELEPHONE ENCOUNTER
Eleuterio from [facility]. They were able to inject 2% lidocaine into the tube. This helped to be able to change the dressing, is this okay to continue?  Also wondering if okay to remove the wound vac on Monday and come to the appointment without it for ease of transport.  Is this okay for them to do when they send her for the Monday appointment with Dr. Sanches?      Eleuterio: [phone]

## 2024-11-25 ENCOUNTER — TELEPHONE (OUTPATIENT)
Dept: VASCULAR SURGERY | Facility: CLINIC | Age: 79
End: 2024-11-25

## 2024-11-25 ENCOUNTER — LAB REQUISITION (OUTPATIENT)
Dept: LAB | Facility: CLINIC | Age: 79
End: 2024-11-25
Payer: MEDICARE

## 2024-11-25 ENCOUNTER — OFFICE VISIT (OUTPATIENT)
Dept: VASCULAR SURGERY | Facility: CLINIC | Age: 79
End: 2024-11-25
Attending: SURGERY
Payer: MEDICARE

## 2024-11-25 ENCOUNTER — HOSPITAL ENCOUNTER (INPATIENT)
Facility: HOSPITAL | Age: 79
Setting detail: SURGERY ADMIT
End: 2024-11-25
Attending: SURGERY | Admitting: SURGERY
Payer: MEDICARE

## 2024-11-25 ENCOUNTER — HOSPITAL ENCOUNTER (INPATIENT)
Facility: CLINIC | Age: 79
DRG: 593 | End: 2024-11-25
Attending: EMERGENCY MEDICINE
Payer: MEDICARE

## 2024-11-25 VITALS
TEMPERATURE: 98 F | OXYGEN SATURATION: 99 % | DIASTOLIC BLOOD PRESSURE: 93 MMHG | SYSTOLIC BLOOD PRESSURE: 133 MMHG | HEART RATE: 89 BPM

## 2024-11-25 DIAGNOSIS — R79.82 ELEVATED C-REACTIVE PROTEIN (CRP): ICD-10-CM

## 2024-11-25 DIAGNOSIS — I73.9 PAD (PERIPHERAL ARTERY DISEASE) (H): ICD-10-CM

## 2024-11-25 DIAGNOSIS — Z79.4 TYPE 2 DIABETES MELLITUS WITH COMPLICATION, WITH LONG-TERM CURRENT USE OF INSULIN (H): Chronic | ICD-10-CM

## 2024-11-25 DIAGNOSIS — L03.115 CELLULITIS OF RIGHT LOWER LIMB: ICD-10-CM

## 2024-11-25 DIAGNOSIS — Z89.512 STATUS POST BELOW-KNEE AMPUTATION OF LEFT LOWER EXTREMITY (H): Chronic | ICD-10-CM

## 2024-11-25 DIAGNOSIS — I10 ESSENTIAL HYPERTENSION WITH GOAL BLOOD PRESSURE LESS THAN 140/90: Primary | Chronic | ICD-10-CM

## 2024-11-25 DIAGNOSIS — E11.8 TYPE 2 DIABETES MELLITUS WITH COMPLICATION, WITH LONG-TERM CURRENT USE OF INSULIN (H): Chronic | ICD-10-CM

## 2024-11-25 DIAGNOSIS — L08.9 LOCAL INFECTION OF THE SKIN AND SUBCUTANEOUS TISSUE, UNSPECIFIED: ICD-10-CM

## 2024-11-25 DIAGNOSIS — F11.20 OPIOID DEPENDENCE, UNCOMPLICATED (H): ICD-10-CM

## 2024-11-25 DIAGNOSIS — L03.115 CELLULITIS OF RIGHT LOWER EXTREMITY: Primary | ICD-10-CM

## 2024-11-25 DIAGNOSIS — G89.29 OTHER CHRONIC PAIN: Chronic | ICD-10-CM

## 2024-11-25 DIAGNOSIS — L97.219 VENOUS STASIS ULCER OF RIGHT CALF, UNSPECIFIED ULCER STAGE, UNSPECIFIED WHETHER VARICOSE VEINS PRESENT (H): ICD-10-CM

## 2024-11-25 DIAGNOSIS — Z16.22 RESISTANCE TO VANCOMYCIN RELATED ANTIBIOTICS: ICD-10-CM

## 2024-11-25 DIAGNOSIS — I83.012 VENOUS STASIS ULCER OF RIGHT CALF, UNSPECIFIED ULCER STAGE, UNSPECIFIED WHETHER VARICOSE VEINS PRESENT (H): ICD-10-CM

## 2024-11-25 LAB
ANION GAP SERPL CALCULATED.3IONS-SCNC: 11 MMOL/L (ref 7–15)
BASOPHILS # BLD AUTO: 0.1 10E3/UL (ref 0–0.2)
BASOPHILS NFR BLD AUTO: 1 %
BUN SERPL-MCNC: 32.3 MG/DL (ref 8–23)
CALCIUM SERPL-MCNC: 10.1 MG/DL (ref 8.8–10.4)
CHLORIDE SERPL-SCNC: 96 MMOL/L (ref 98–107)
CREAT SERPL-MCNC: 0.88 MG/DL (ref 0.51–0.95)
CRP SERPL-MCNC: <3 MG/L
EGFRCR SERPLBLD CKD-EPI 2021: 66 ML/MIN/1.73M2
EOSINOPHIL # BLD AUTO: 0.4 10E3/UL (ref 0–0.7)
EOSINOPHIL NFR BLD AUTO: 4 %
ERYTHROCYTE [DISTWIDTH] IN BLOOD BY AUTOMATED COUNT: 17.2 % (ref 10–15)
GLUCOSE SERPL-MCNC: 205 MG/DL (ref 70–99)
HCO3 SERPL-SCNC: 31 MMOL/L (ref 22–29)
HCT VFR BLD AUTO: 33.3 % (ref 35–47)
HGB BLD-MCNC: 10.4 G/DL (ref 11.7–15.7)
IMM GRANULOCYTES # BLD: 0 10E3/UL
IMM GRANULOCYTES NFR BLD: 0 %
LYMPHOCYTES # BLD AUTO: 1.5 10E3/UL (ref 0.8–5.3)
LYMPHOCYTES NFR BLD AUTO: 13 %
MCH RBC QN AUTO: 27.8 PG (ref 26.5–33)
MCHC RBC AUTO-ENTMCNC: 31.2 G/DL (ref 31.5–36.5)
MCV RBC AUTO: 89 FL (ref 78–100)
MONOCYTES # BLD AUTO: 0.7 10E3/UL (ref 0–1.3)
MONOCYTES NFR BLD AUTO: 6 %
NEUTROPHILS # BLD AUTO: 8.5 10E3/UL (ref 1.6–8.3)
NEUTROPHILS NFR BLD AUTO: 76 %
NRBC # BLD AUTO: 0 10E3/UL
NRBC BLD AUTO-RTO: 0 /100
PLATELET # BLD AUTO: 239 10E3/UL (ref 150–450)
POTASSIUM SERPL-SCNC: 3.4 MMOL/L (ref 3.4–5.3)
PROCALCITONIN SERPL IA-MCNC: 0.14 NG/ML
RBC # BLD AUTO: 3.74 10E6/UL (ref 3.8–5.2)
SODIUM SERPL-SCNC: 138 MMOL/L (ref 135–145)
WBC # BLD AUTO: 11.2 10E3/UL (ref 4–11)

## 2024-11-25 PROCEDURE — 250N000011 HC RX IP 250 OP 636: Performed by: STUDENT IN AN ORGANIZED HEALTH CARE EDUCATION/TRAINING PROGRAM

## 2024-11-25 PROCEDURE — 250N000013 HC RX MED GY IP 250 OP 250 PS 637: Performed by: STUDENT IN AN ORGANIZED HEALTH CARE EDUCATION/TRAINING PROGRAM

## 2024-11-25 PROCEDURE — 120N000004 HC R&B MS OVERFLOW

## 2024-11-25 PROCEDURE — 250N000011 HC RX IP 250 OP 636

## 2024-11-25 PROCEDURE — 84145 PROCALCITONIN (PCT): CPT | Performed by: STUDENT IN AN ORGANIZED HEALTH CARE EDUCATION/TRAINING PROGRAM

## 2024-11-25 PROCEDURE — 96374 THER/PROPH/DIAG INJ IV PUSH: CPT

## 2024-11-25 PROCEDURE — 85041 AUTOMATED RBC COUNT: CPT | Performed by: STUDENT IN AN ORGANIZED HEALTH CARE EDUCATION/TRAINING PROGRAM

## 2024-11-25 PROCEDURE — 99285 EMERGENCY DEPT VISIT HI MDM: CPT

## 2024-11-25 PROCEDURE — G0463 HOSPITAL OUTPT CLINIC VISIT: HCPCS | Performed by: SURGERY

## 2024-11-25 PROCEDURE — 99024 POSTOP FOLLOW-UP VISIT: CPT | Performed by: SURGERY

## 2024-11-25 PROCEDURE — 82310 ASSAY OF CALCIUM: CPT | Performed by: STUDENT IN AN ORGANIZED HEALTH CARE EDUCATION/TRAINING PROGRAM

## 2024-11-25 PROCEDURE — 82374 ASSAY BLOOD CARBON DIOXIDE: CPT | Performed by: STUDENT IN AN ORGANIZED HEALTH CARE EDUCATION/TRAINING PROGRAM

## 2024-11-25 PROCEDURE — 36415 COLL VENOUS BLD VENIPUNCTURE: CPT | Performed by: STUDENT IN AN ORGANIZED HEALTH CARE EDUCATION/TRAINING PROGRAM

## 2024-11-25 PROCEDURE — 85025 COMPLETE CBC W/AUTO DIFF WBC: CPT | Performed by: STUDENT IN AN ORGANIZED HEALTH CARE EDUCATION/TRAINING PROGRAM

## 2024-11-25 PROCEDURE — 80048 BASIC METABOLIC PNL TOTAL CA: CPT | Performed by: STUDENT IN AN ORGANIZED HEALTH CARE EDUCATION/TRAINING PROGRAM

## 2024-11-25 PROCEDURE — 86140 C-REACTIVE PROTEIN: CPT | Performed by: STUDENT IN AN ORGANIZED HEALTH CARE EDUCATION/TRAINING PROGRAM

## 2024-11-25 PROCEDURE — 258N000003 HC RX IP 258 OP 636

## 2024-11-25 PROCEDURE — 250N000013 HC RX MED GY IP 250 OP 250 PS 637

## 2024-11-25 RX ORDER — LIDOCAINE 40 MG/G
CREAM TOPICAL
Status: DISCONTINUED | OUTPATIENT
Start: 2024-11-25 | End: 2024-12-04 | Stop reason: HOSPADM

## 2024-11-25 RX ORDER — SENNOSIDES 8.6 MG
2 TABLET ORAL 2 TIMES DAILY
Status: DISCONTINUED | OUTPATIENT
Start: 2024-11-25 | End: 2024-12-02

## 2024-11-25 RX ORDER — HYDROMORPHONE HYDROCHLORIDE 1 MG/ML
0.5 INJECTION, SOLUTION INTRAMUSCULAR; INTRAVENOUS; SUBCUTANEOUS ONCE
Status: COMPLETED | OUTPATIENT
Start: 2024-11-25 | End: 2024-11-25

## 2024-11-25 RX ORDER — METRONIDAZOLE 500 MG/1
500 TABLET ORAL 3 TIMES DAILY
Status: DISCONTINUED | OUTPATIENT
Start: 2024-11-25 | End: 2024-12-04 | Stop reason: HOSPADM

## 2024-11-25 RX ORDER — PREGABALIN 25 MG/1
25 CAPSULE ORAL 2 TIMES DAILY
Status: DISCONTINUED | OUTPATIENT
Start: 2024-11-25 | End: 2024-12-04 | Stop reason: HOSPADM

## 2024-11-25 RX ORDER — CEFEPIME HYDROCHLORIDE 2 G/1
2 INJECTION, POWDER, FOR SOLUTION INTRAVENOUS EVERY 8 HOURS
Status: DISCONTINUED | OUTPATIENT
Start: 2024-11-25 | End: 2024-12-04 | Stop reason: HOSPADM

## 2024-11-25 RX ORDER — SODIUM CHLORIDE 9 MG/ML
INJECTION, SOLUTION INTRAVENOUS CONTINUOUS
Status: DISCONTINUED | OUTPATIENT
Start: 2024-11-25 | End: 2024-11-27

## 2024-11-25 RX ORDER — ONDANSETRON 4 MG/1
4 TABLET, ORALLY DISINTEGRATING ORAL EVERY 6 HOURS PRN
Status: DISCONTINUED | OUTPATIENT
Start: 2024-11-25 | End: 2024-11-25

## 2024-11-25 RX ORDER — METHADONE HYDROCHLORIDE 5 MG/1
5 TABLET ORAL 4 TIMES DAILY
Status: DISCONTINUED | OUTPATIENT
Start: 2024-11-26 | End: 2024-12-04 | Stop reason: HOSPADM

## 2024-11-25 RX ORDER — ACETAMINOPHEN 325 MG/1
975 TABLET ORAL 3 TIMES DAILY
Status: DISCONTINUED | OUTPATIENT
Start: 2024-11-26 | End: 2024-12-04 | Stop reason: HOSPADM

## 2024-11-25 RX ORDER — POLYETHYLENE GLYCOL 3350 17 G/17G
17 POWDER, FOR SOLUTION ORAL 2 TIMES DAILY PRN
Status: DISCONTINUED | OUTPATIENT
Start: 2024-11-25 | End: 2024-12-04 | Stop reason: HOSPADM

## 2024-11-25 RX ORDER — METHADONE HYDROCHLORIDE 5 MG/1
5 TABLET ORAL ONCE
Status: COMPLETED | OUTPATIENT
Start: 2024-11-25 | End: 2024-11-25

## 2024-11-25 RX ORDER — HYDROMORPHONE HCL IN WATER/PF 6 MG/30 ML
0.2 PATIENT CONTROLLED ANALGESIA SYRINGE INTRAVENOUS
Status: DISCONTINUED | OUTPATIENT
Start: 2024-11-25 | End: 2024-11-26

## 2024-11-25 RX ORDER — AMOXICILLIN 250 MG
1 CAPSULE ORAL 2 TIMES DAILY PRN
Status: DISCONTINUED | OUTPATIENT
Start: 2024-11-25 | End: 2024-11-26

## 2024-11-25 RX ORDER — ONDANSETRON 4 MG/1
4 TABLET, ORALLY DISINTEGRATING ORAL EVERY 6 HOURS PRN
Status: DISCONTINUED | OUTPATIENT
Start: 2024-11-25 | End: 2024-12-04 | Stop reason: HOSPADM

## 2024-11-25 RX ORDER — PROCHLORPERAZINE MALEATE 5 MG/1
5 TABLET ORAL EVERY 6 HOURS PRN
Status: DISCONTINUED | OUTPATIENT
Start: 2024-11-25 | End: 2024-12-04 | Stop reason: HOSPADM

## 2024-11-25 RX ORDER — HYDROMORPHONE HYDROCHLORIDE 2 MG/1
2-4 TABLET ORAL
Status: DISCONTINUED | OUTPATIENT
Start: 2024-11-25 | End: 2024-11-26

## 2024-11-25 RX ORDER — INSULIN ASPART 100 [IU]/ML
5 INJECTION, SOLUTION INTRAVENOUS; SUBCUTANEOUS
COMMUNITY
Start: 2024-11-22

## 2024-11-25 RX ORDER — CALCIUM CARBONATE 500 MG/1
1000 TABLET, CHEWABLE ORAL 4 TIMES DAILY PRN
Status: DISCONTINUED | OUTPATIENT
Start: 2024-11-25 | End: 2024-12-04 | Stop reason: HOSPADM

## 2024-11-25 RX ORDER — ATORVASTATIN CALCIUM 40 MG/1
40 TABLET, FILM COATED ORAL EVERY EVENING
Status: DISCONTINUED | OUTPATIENT
Start: 2024-11-25 | End: 2024-12-04 | Stop reason: HOSPADM

## 2024-11-25 RX ORDER — TORSEMIDE 20 MG/1
60 TABLET ORAL DAILY
Status: DISCONTINUED | OUTPATIENT
Start: 2024-11-26 | End: 2024-12-04 | Stop reason: HOSPADM

## 2024-11-25 RX ORDER — ONDANSETRON 2 MG/ML
4 INJECTION INTRAMUSCULAR; INTRAVENOUS EVERY 6 HOURS PRN
Status: DISCONTINUED | OUTPATIENT
Start: 2024-11-25 | End: 2024-12-04 | Stop reason: HOSPADM

## 2024-11-25 RX ADMIN — METRONIDAZOLE 500 MG: 500 TABLET, FILM COATED ORAL at 23:06

## 2024-11-25 RX ADMIN — HYDROMORPHONE HYDROCHLORIDE 2 MG: 2 TABLET ORAL at 23:39

## 2024-11-25 RX ADMIN — APIXABAN 5 MG: 5 TABLET, FILM COATED ORAL at 23:00

## 2024-11-25 RX ADMIN — PREGABALIN 25 MG: 25 CAPSULE ORAL at 23:00

## 2024-11-25 RX ADMIN — CEFEPIME 2 G: 2 INJECTION, POWDER, FOR SOLUTION INTRAVENOUS at 23:06

## 2024-11-25 RX ADMIN — HYDROMORPHONE HYDROCHLORIDE 0.5 MG: 1 INJECTION, SOLUTION INTRAMUSCULAR; INTRAVENOUS; SUBCUTANEOUS at 17:21

## 2024-11-25 RX ADMIN — ATORVASTATIN CALCIUM 40 MG: 40 TABLET, FILM COATED ORAL at 23:01

## 2024-11-25 RX ADMIN — SODIUM CHLORIDE: 9 INJECTION, SOLUTION INTRAVENOUS at 23:56

## 2024-11-25 RX ADMIN — METHADONE HYDROCHLORIDE 5 MG: 5 TABLET ORAL at 18:34

## 2024-11-25 ASSESSMENT — COLUMBIA-SUICIDE SEVERITY RATING SCALE - C-SSRS
1. IN THE PAST MONTH, HAVE YOU WISHED YOU WERE DEAD OR WISHED YOU COULD GO TO SLEEP AND NOT WAKE UP?: NO
2. HAVE YOU ACTUALLY HAD ANY THOUGHTS OF KILLING YOURSELF IN THE PAST MONTH?: NO
6. HAVE YOU EVER DONE ANYTHING, STARTED TO DO ANYTHING, OR PREPARED TO DO ANYTHING TO END YOUR LIFE?: NO

## 2024-11-25 ASSESSMENT — ACTIVITIES OF DAILY LIVING (ADL)
ADLS_ACUITY_SCORE: 59

## 2024-11-25 ASSESSMENT — PAIN SCALES - GENERAL: PAINLEVEL_OUTOF10: SEVERE PAIN (6)

## 2024-11-25 NOTE — TELEPHONE ENCOUNTER
Need to review eliquis hold with PCP Dr. Feldman. Call placed to clinic and message left.     Reviewed Blood Thinners and plan for holding, continuing and/or bridging: Eliquis: hold x 3 days    Reviewed Diabetic medications that are GLP-1 agonists: NA  Please discuss with your primary doctor and follow the hold instructions:   []  Hold seven (7) days prior for once weekly injectable doses [semaglutide (Ozempic, Wegovy), dulaglutide (Trulicity), exenatide ER (Bydureon), tirzepatide (Mounjaro)]  []  Hold the day before and day of for once daily injectable GLP-1 agonists [exenatide (Byetta), liraglutide (Saxenda, Victoza)]  []  Hold seven (7) days for oral semaglutide (Rybelsus)

## 2024-11-25 NOTE — ED PROVIDER NOTES
Emergency Department Midlevel Supervisory Note    Date/Time:11/25/2024 5:47 PM    I personally saw the patient and performed a substantive portion of the visit including all aspects of the medical decision making.  I am seeing this patient along with Lor Can PA-C.  I, Milo Courtney D.O., have reviewed the documentation, personally taken the patient's history, performed an exam and agree with the physical finds, diagnosis and management plan.    Prior records were reviewed.  I personally performed history and physical.  I personally reviewed lab, EKG, and radiology results as indicated.  Care was discussed with mid-level provider.  Diagnosis and disposition were discussed.  Final disposition will be per the TONIO depending diagnostic studies and patient's clinical trajectory.      ED Course:          DIAGNOSIS:  No diagnosis found.      Medications:  New Prescriptions    No medications on file         Labs and Imaging:           Milo Courtney D.O.  Emergency Medicine  [facility] EMERGENCY ROOM  [facility] [place] [address] [phone]  Dept: [phone]

## 2024-11-25 NOTE — TELEPHONE ENCOUNTER
Eleuterio at [facility] called, patient is on her way to [facility] ED due to excessive bleeding from the calf wound. Eleuterio states there is also adaptic embedded in the calf wound which they were unable to remove.  The stump wound for the BKA looks good.    Discussed with kesha Iglesias to make appointment with him to manage the calf wound.

## 2024-11-25 NOTE — ED TRIAGE NOTES
"Pt presents to the ED via EMS from TCU at [facility]. Pt recently had R blow the knee amputation. Sent to clinic for dressing change. Per EMS, unable to change the dressing as it was adhered to the wound. At the clinic they attempted ot removed the wound vac but could not remove the dressing. Sent with paperwork that states pt needs \"removal of foreign body in venous wound\".     Pt has a PICC line for vancomycin infusions.     Triage Assessment (Adult)       Row Name 11/25/24 4802          Triage Assessment    Airway WDL WDL        Respiratory WDL    Respiratory WDL WDL        Skin Circulation/Temperature WDL    Skin Circulation/Temperature WDL X        Cardiac WDL    Cardiac WDL WDL        Peripheral/Neurovascular WDL    Peripheral Neurovascular WDL X;neurovascular assessment lower        Cognitive/Neuro/Behavioral WDL    Cognitive/Neuro/Behavioral WDL WDL                     "

## 2024-11-25 NOTE — PATIENT INSTRUCTIONS
This is the plan that was discussed at your appointment.    Recheck hemoglobin A1C lab in 3 weeks  We will contact you to schedule your completion surgery, to be done end of December or January        I am including additional information on these things and our contact information if you have any questions or concerns.   Please do not hesitate to reach out to us if you felt we did not answer your questions or you are unsure of the treatment plan after your visit today. Our number is [phone].  Thank you for trusting us with your care.         Again thank you for your time.

## 2024-11-25 NOTE — TELEPHONE ENCOUNTER
Surgery Scheduled    Routing message to nursing to address Eliquis instructions.  Pt will need A1c tested prior to surgery.    Surgery/Procedure: AMPUTATION, BELOW KNEE, completion, RIGHT leg    Location: [facility]:  [facility] [place] [address] (phone: [phone], Fax: [phone])    Please park in [address]. Enter through the main entrance. Check in at the Welcome Desk and you will be directed to the surgery unit.     Date: 1/7/2024    Time: 720 AM    Admission Type: Inpatient    Surgeon: Dr. Tierney Sanches    OR Confirmed & :  Yes with Lizbet on 11/25/2024    Entered on provider calendar:  Yes    Post Op: TBD    Wound Vac Needed: No    Home Care Needed: No    Blood Thinners Addressed:  Eliquis    Stress Test Clearance: NO

## 2024-11-25 NOTE — PROGRESS NOTES
VASCULAR SURGERY PROGRESS NOTE   VASCULAR SURGEON: Tierney Sanches MD, RPVI     LOCATION:  [facility]     Linda Loredo   Medical Record #:  [id number]  YOB: 1945  Age:  79 year old     Date of Service: 11/25/2024    PRIMARY CARE PROVIDER: Louann Peraza Physician      Reason for visit: Follow-up    IMPRESSION: 79-year-old female who comes to vascular surgical for follow-up.  Patient recently underwent right guillotine below-knee amputation.  Doing well.  On systemic antibiotics    RECOMMENDATION/RISKS: We will proceed with completion sometime in December or early January, per patient request.    HPI:  Linda Loredo is a 79 year old female who was seen today for follow-up  REVIEW OF SYSTEMS:    A 12 point ROS was reviewed and is negative      PHH:    Past Medical History:   Diagnosis Date    Abscess of left foot 10/31/2022    Acute cystitis without hematuria 07/11/2024    Acute kidney injury (H) 07/11/2024    Adverse effect of anticoagulants, initial encounter 04/16/2024    GUSTAVO (acute kidney injury) (H) 05/26/2023    Allergic rhinitis 04/08/2008    Anemia 07/11/2024    Anticoagulation monitoring, INR range 2-3 07/06/2022    Anxiety 10/20/2011    Cardiac murmur, unspecified 05/29/2019    Cellulitis - bilateral lower extremities 05/27/2023    Cellulitis of buttock 05/26/2023    Chronic atrial fibrillation (H) 05/30/2022    New onset during 5/2022 admission      Chronic kidney disease, stage 3b (H) 06/12/2024    Chronic pain 04/26/2010    Chronic right-sided heart failure (H) 11/16/2023    Constipation 04/16/2024    Decubitus ulcers - 5 present on buttocks/perineal region, numerous scabbed over and unstagable on shin and bilateral feet with some bloody blisters noted on feet 05/27/2023    Depressive disorder, not elsewhere classified     Diabetic foot ulcer (H) 09/29/2023    Diabetic polyneuropathy associated with type 2 diabetes mellitus (H) 04/07/2006     2007: on gabapentin.  She has been switched from gabapentin to lyrica.       Elevated liver enzymes 10/20/2011    Essential hypertension with goal blood pressure less than 140/90 2005    Fibromyalgia 10/20/2011    Fracture of fibula, distal, left, closed 10/20/2011    ORIF 10/13/11      Herpes zoster 2005: On gabapentin and lidoderm. She has been switched from gabapentin to lyrica.       Herpes zoster without mention of complication     Hx of osteomyelitis 2024    S.p left BKA       Hypercalcemia 2007    Hypoglycemia 2023    Hypotension 10/27/2011    Insomnia 07/15/2005    Lymphedema, not elsewhere classified 2024    Major depressive disorder, recurrent episode, moderate (H) 2008    Metabolic encephalopathy 2024    Microalbuminuria due to type 2 diabetes mellitus (H) 10/25/2016    Mild intermittent asthma     Mixed hyperlipidemia 2005    Mixed hyperlipidemia due to type 2 diabetes mellitus (H) 2008    Formatting of this note is different from the original.     22 ASCVD 10 year risk: 37.9%      Last Lipids:  Last Lipids:  Chol: 2021 130   63  HDL: 2021 46  Non-HDL: 2021 84  Chol/HDL Ratio: 2021 2.83  LDL DIRECT: . No results found in past 5 years   LDL CHOLESTEROL (mg/dL)   Date Value   2021 71      22   The 10-year ASCVD risk score (Teddy LUIS Jr.    Mixed stress and urge urinary incontinence 2005: On Detrol LA.      Non-healing wound of left heel 2023    Obesity with BMI >35 and comorbidity 2006: Body mass index is 48.07 kg/(m^2).       Opioid dependence, uncomplicated (H) 2023    BERLIN (obstructive sleep apnea) 10/23/2007    Sleep study 2004 reportedly showing severe OBSTRUCTIVE SLEEP APNEA and recommend CPAP, Not available for review. Patient is untreated       Osteoarthritis 2006    Osteomyelitis  (H) 10/24/2023    Osteopenia 05/20/2015    Other abnormalities of gait and mobility 11/15/2023    Other urinary incontinence     Pressure injury of deep tissue of sacral region 07/11/2024    Primary hyperparathyroidism (H) 01/23/2013    Work up January 2013 Dr. 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       Supratherapeutic INR 05/27/2023    Type 2 diabetes mellitus with complication, with long-term current use of insulin (H) 04/18/2005    diagnosed in 2001       Type II or unspecified type diabetes mellitus with renal manifestations, uncontrolled(250.42) (H)     Type II or unspecified type diabetes mellitus without mention of complication, not stated as uncontrolled     Umbilical hernia without obstruction and without gangrene 12/13/2018    Unspecified essential hypertension     Unspecified open wound, left foot, subsequent encounter 09/22/2023    UTI (urinary tract infection) - possible 05/26/2023    Venous stasis 04/16/2024    Vitamin D deficiency 09/08/2022    Warfarin-induced coagulopathy (H) 05/27/2023          Past Surgical History:   Procedure Laterality Date    AMPUTATE LEG BELOW KNEE Left 10/7/2023    Procedure: LEFT GUILLOTINE BELOW KNEE AMPUTATION.;  Surgeon: Lan Otto MD;  Location:  OR    AMPUTATE LEG BELOW KNEE Left 10/14/2023    Procedure: debridement of left below the knee amputation;  Surgeon: Lan Otto MD;  Location:  OR    AMPUTATION,  LOWER EXTREMITY, USING GUILLOTINE TECHNIQUE Right 11/14/2024    Procedure: AMPUTATION, RIGHT LOWER EXTREMITY, USING GUILLOTINE TECHNIQUE;  Surgeon: Tierney Sanches MD;  Location: [facility]    APPLY WOUND VAC Left 1/2/2024    Procedure: WOUND VAC APPLICATION TO LEFT BELOW KNEE STUMP;  Surgeon: Lan Otto MD;  Location: [facility] OR    CHOLECYSTECTOMY      GRAFT SKIN SPLIT THICKNESS FROM TRUNK TO HEAD Left 1/2/2024    Procedure: APPLICATION OF SPLIT-THICKNESS SKIN GRAFT TO LEFT BELOW KNEE STUMP, GRAFT FROM LEFT THIGH;  Surgeon: Lan Otto MD;  Location: [facility] OR    INCISION AND DRAINAGE FOOT, COMBINED Left 9/26/2023    Procedure: Surgical debridement of all nonviable skin and soft tissue and bone left foot;  Surgeon: Nolberto Thorne DPM;  Location: [facility]    IR LOWER EXTREMITY ANGIOGRAM LEFT  10/3/2023    IRRIGATION AND DEBRIDEMENT LOWER EXTREMITY, COMBINED Right 7/14/2024    Procedure: IRRIGATION AND DEBRIDEMENT, LOWER EXTREMITY, RIGHT LOWER LEG;  Surgeon: Phuong Gutierrez MD;  Location: [facility] OR    ligation of fallopian tube      OPEN REDUCTION INTERNAL FIXATION ANKLE  10/13/11    left    PICC SINGLE LUMEN PLACEMENT  11/18/2024    pinning of left 2nd toe         ALLERGIES:  Prednisone, Morphine, Morphine [fumaric acid], and Nitrofurantoin    MEDS:    Current Outpatient Medications:     acetaminophen (TYLENOL) 325 MG tablet, Take 3 tablets (975 mg) by mouth 3 times daily., Disp: 180 tablet, Rfl: 0    apixaban ANTICOAGULANT (ELIQUIS) 5 MG tablet, Take 1 tablet (5 mg) by mouth 2 times daily., Disp: 60 tablet, Rfl: 0    atorvastatin (LIPITOR) 40 MG tablet, Take 1 tablet (40 mg) by mouth every evening, Disp: , Rfl:     calcium carbonate (TUMS) 500 MG chewable tablet, Take 1 tablet (500 mg) by mouth 4 times daily as needed for heartburn., Disp: 90 tablet, Rfl: 0    ceFEPIme (MAXIPIME) 2 g vial, Inject 2 g over 30 minutes into the vein every 8 hours for 21 days., Disp: , Rfl:      HYDROmorphone (DILAUDID) 2 MG tablet, Take 1-2 tablets (2-4 mg) by mouth every 3 hours as needed for breakthrough pain., Disp: , Rfl:     insulin degludec (TRESIBA) 200 UNIT/ML pen, Inject 34 Units subcutaneously every morning Hold for BG < 100, Disp: , Rfl:     lidocaine (XYLOCAINE) 5 % external ointment, Apply topically 3 times daily., Disp: 100 g, Rfl: 0    melatonin 1 MG TABS tablet, Take 1 tablet (1 mg) by mouth nightly as needed for sleep, Disp: , Rfl:     methadone (DOLOPHINE) 5 MG tablet, Take 1 tablet (5 mg) by mouth 4 times daily., Disp: 120 tablet, Rfl: 0    metroNIDAZOLE (FLAGYL) 500 MG tablet, Take 1 tablet (500 mg) by mouth 3 times daily., Disp: 42 tablet, Rfl: 0    mineral oil-hydrophilic petrolatum (AQUAPHOR) external ointment, Apply to left thigh wound BID, Disp: , Rfl:     ondansetron (ZOFRAN ODT) 4 MG ODT tab, Take 1 tablet (4 mg) by mouth every 6 hours as needed for nausea or vomiting., Disp: 30 tablet, Rfl: 0    polyethylene glycol (MIRALAX) 17 g packet, Take 17 g by mouth 2 times daily as needed for constipation., Disp: 30 packet, Rfl: 3    pregabalin (LYRICA) 25 MG capsule, Take 1 capsule (25 mg) by mouth 2 times daily., Disp: 60 capsule, Rfl: 0    senna-docusate (SENOKOT-S/PERICOLACE) 8.6-50 MG tablet, Take 1 tablet by mouth 2 times daily as needed for constipation., Disp: 60 tablet, Rfl: 0    sennosides (SENOKOT) 8.6 MG tablet, Take 2 tablets by mouth 2 times daily., Disp: 180 tablet, Rfl: 0    torsemide (DEMADEX) 20 MG tablet, Take 60 mg by mouth daily, Disp: , Rfl:     vancomycin (VANCOCIN) 1250 mg/250 mL IVPB, Inject 1,250 mg over 90 minutes into the vein every 24 hours for 21 days. Weekly CBC with differential, BMP, CRP vancomycin trough/AUC, Disp: , Rfl:     wound support modular (EXPEDITE) LIQD bottle, Take 60 mLs by mouth daily, Disp: , Rfl:     Zinc oxide 10 % CREA, Externally apply topically 3 times daily Apply to coccyx area once per shift after brief changes, Disp: , Rfl:      SOCIAL HABITS:    History   Smoking Status    Never   Smokeless Tobacco    Never     Social History    Substance and Sexual Activity      Alcohol use: No      History   Drug Use No       FAMILY HISTORY:    Family History   Problem Relation Age of Onset    Hypertension Mother     Heart Disease Father         heart attack and cardiomegaly    Diabetes Sister         type 2    Hypertension Sister     Respiratory Sister     Psychotic Disorder Sister         bipolar    Cancer Maternal Grandmother         throat    Cancer Paternal Grandmother         gastric       PE:  BP (!) 133/93   Pulse 89   Temp 98  F (36.7  C)   SpO2 99%   Wt Readings from Last 1 Encounters:   24 102.9 kg (226 lb 13.7 oz)     There is no height or weight on file to calculate BMI.    EXAM:  GENERAL: This is a well-developed 79 year old female who appears her stated age  EYES: Grossly normal.  MOUTH: Buccal mucosa normal   CARDIAC: Normal   CHEST/LUNG: Clear to auscultation bilaterally  GASTROINTESINAL soft nontender nondistended  MUSCULOSKELETAL: Grossly normal and both lower extremities are intact.  HEME/LYMPH: No lymphedema  NEUROLOGIC: Focally intact, Alert and oriented x 3.   PSYCH: appropriate affect            DIAGNOSTIC STUDIES:     Images:  XR PICC Chest Placement Port 1vw    Result Date: 2024  EXAM: XR CHEST PICC PLACEMENT PORT 1 VIEW LOCATION: [facility] DATE: 2024 INDICATION: Hx Afib. RN inserted PICC   tip verification by XR COMPARISON: 6/10/2024     IMPRESSION: Right upper extremity PICC tip in the upper SVC. Lungs and pleural spaces are clear. Unchanged enlarged cardiac silhouette.     Echocardiogram Complete    Result Date: 2024  [id number]^KATYA^[place] [address]  Name: ALEXIA BURNS MRN: [id number] : 1945 Study Date: 2024 09:58 AM Age: 79 yrs Gender: Female Patient Location:  [facility] Reason For Study: Atrial Fibrillation Ordering Physician: RANDAL ALDANA Performed By: ARTHUR  BSA: 2.1 m2 Height: 66 in Weight: 230 lb HR: 74 BP: 103/58 mmHg ______________________________________________________________________________ Procedure Complete Portable Echo Adult. Definity (NDC #24361-726) given intravenously. Technically difficult study. ______________________________________________________________________________ Interpretation Summary  1. Normal left ventricular size and systolic performance with a visually estimated ejection fraction of 60%. 2. No significant valvular heart disease is identified on this study. 3. Mild right ventricular enlargement with normal right ventricular systolic performance. 4. There is mild biatrial enlargement. ______________________________________________________________________________ Left ventricle: Normal left ventricular size and systolic performance with a visually estimated ejection fraction of 60%. There is normal regional wall motion. There is borderline concentric increase in left ventricular wall thickness.  Assessment of LV Diastolic Function: Patient appears to be in atrial fibrillation which limits assessment of diastolic filling.  Right ventricle: Mild right ventricular enlargement with normal right ventricular systolic performance.  Left atrium: There is mild left atrial enlargement.  Right atrium: There is mild right atrial enlargement.  IVC: The IVC is borderline dilated.  Aortic valve: The aortic valve is comprised of three cusps. No significant aortic stenosis or aortic insufficiency is detected on this study.  Mitral valve: The mitral valve appears morphologically normal. There is mild mitral insufficiency.  Tricuspid valve: The tricuspid valve is grossly morphologically normal. There is mild tricuspid insufficiency.  Pulmonic valve: The pulmonic valve is grossly morphologically normal.  Thoracic aorta: The aortic root and proximal ascending  aorta are of normal dimension.  Pericardium: There is no significant pericardial effusion. ______________________________________________________________________________ ______________________________________________________________________________ MMode/2D Measurements & Calculations IVSd: 1.2 cm LVIDd: 4.6 cm LVIDs: 3.4 cm LVPWd: 1.2 cm FS: 26.3 % LV mass(C)d: 200.8 grams LV mass(C)dI: 94.6 grams/m2 Ao root diam: 3.0 cm asc Aorta Diam: 3.6 cm LVOT diam: 2.0 cm LVOT area: 3.2 cm2 Ao root diam index Ht(cm/m): 1.8 Ao root diam index BSA (cm/m2): 1.4 Asc Ao diam index BSA (cm/m2): 1.7 Asc Ao diam index Ht(cm/m): 2.2 RWT: 0.52  Doppler Measurements & Calculations TR max ray: 213.1 cm/sec TR max P.2 mmHg RV S Ray: 13.5 cm/sec  ______________________________________________________________________________ Report approved by: Giovana Rodríguez 2024 11:04 AM       MR Ankle Right w/o Contrast    Result Date: 2024  EXAM: MR ANKLE RIGHT W/O CONTRAST, MR FOOT RIGHT W/O CONTRAST LOCATION: [facility] DATE: 2024 INDICATION: Right foot and ankle infection. COMPARISON: None available. TECHNIQUE: Unenhanced. FINDINGS: Study degraded by motion, particularly patient pain. IV access additionally failed, precluding the administration of intravenous contrast. There is a large ulceration at the posterior inferior aspect of the heel. This extends deep to abut the posteroinferior calcaneus, which demonstrates a shallow cortical erosion. Marrow edema extends anteriorly from this erosion into the mid calcaneus and  is compatible with acute osteomyelitis. There is an additional ulceration along the lateral aspect of the forefoot, which extends deep to abut the fifth metatarsal base.. There is associated cortical erosion of the adjacent fifth metatarsal base, with diffuse marrow edema of the fifth metatarsal compatible with acute osteomyelitis. Additional marrow edema within the cuboid,  lateral cuneiform, and the third and fourth metatarsal bases is also consistent with acute osteomyelitis. There is also the appearance of marrow edema within the fifth toe, though this may be artifactual. No evidence of osteonecrosis or abscess formation, within limitation of the noncontrast technique. There is some notable undermining associated with the distal ulceration at the lateral forefoot, which extends dorsally along the distal fifth metatarsal. There is diffuse, superficial soft tissue swelling throughout the foot and ankle, which could be a chewable to a combination of bland edema and cellulitis. No evidence of fracture, stress fracture, or avascular necrosis. No evidence of abscess. Subacute on chronic denervation of the intrinsic foot musculature, though some degree of edema within the intrinsic musculature at the lateral forefoot/midfoot could be secondary to a superimposed myositis. Foot and ankle tendons appear normal in course and caliber. Plantar fascia appears intact.     IMPRESSION: Acute osteomyelitis of the posterior calcaneus, fifth metatarsal, third and fourth metatarsal bases, cuboid, and lateral cuneiform, as detailed above. No evidence of abscess or osteonecrosis.     MR Foot Right w/o Contrast    Result Date: 11/13/2024  EXAM: MR ANKLE RIGHT W/O CONTRAST, MR FOOT RIGHT W/O CONTRAST LOCATION: [facility] DATE: 11/13/2024 INDICATION: Right foot and ankle infection. COMPARISON: None available. TECHNIQUE: Unenhanced. FINDINGS: Study degraded by motion, particularly patient pain. IV access additionally failed, precluding the administration of intravenous contrast. There is a large ulceration at the posterior inferior aspect of the heel. This extends deep to abut the posteroinferior calcaneus, which demonstrates a shallow cortical erosion. Marrow edema extends anteriorly from this erosion into the mid calcaneus and  is compatible with acute osteomyelitis. There is an  additional ulceration along the lateral aspect of the forefoot, which extends deep to abut the fifth metatarsal base.. There is associated cortical erosion of the adjacent fifth metatarsal base, with diffuse marrow edema of the fifth metatarsal compatible with acute osteomyelitis. Additional marrow edema within the cuboid, lateral cuneiform, and the third and fourth metatarsal bases is also consistent with acute osteomyelitis. There is also the appearance of marrow edema within the fifth toe, though this may be artifactual. No evidence of osteonecrosis or abscess formation, within limitation of the noncontrast technique. There is some notable undermining associated with the distal ulceration at the lateral forefoot, which extends dorsally along the distal fifth metatarsal. There is diffuse, superficial soft tissue swelling throughout the foot and ankle, which could be a chewable to a combination of bland edema and cellulitis. No evidence of fracture, stress fracture, or avascular necrosis. No evidence of abscess. Subacute on chronic denervation of the intrinsic foot musculature, though some degree of edema within the intrinsic musculature at the lateral forefoot/midfoot could be secondary to a superimposed myositis. Foot and ankle tendons appear normal in course and caliber. Plantar fascia appears intact.     IMPRESSION: Acute osteomyelitis of the posterior calcaneus, fifth metatarsal, third and fourth metatarsal bases, cuboid, and lateral cuneiform, as detailed above. No evidence of abscess or osteonecrosis.     US ALMA Doppler No Exercise 1-2 Levels Right    Result Date: 11/12/2024  EXAM: US LOWER EXTREMITY ARTERIAL RT, US ALMA DOPPLER NO EXERCISE, 1-2 LEVELS, RIGHT LOCATION: [facility] DATE: 11/12/2024 INDICATION: Eval PAD COMPARISON: None. TECHNIQUE: Duplex utilizing 2D gray-scale imaging, Doppler interrogation with color-flow and spectral waveform analysis. ALMA FINDINGS: RIGHT                                                Brachial: -- Ankle (PT): Unable to be obtained due to open wounds. Ankle (DP): Unable to be obtained due to open wounds. Digit: 26 Index: 0.21   LEFT Brachial: 123 Ankle (PT): Amputation. Ankle (DP): Amputation. Digit: Amputation. RIGHT LOWER EXTREMITY ARTERIAL ASSESSMENT: External iliac artery: 135 cm/s Common femoral artery: 129 cm/s Profunda femoris artery: 80 cm/s SFA (proximal): 130 cm/s SFA (mid): 105 cm/s SFA (distal): 195 cm/s Popliteal artery: 181-182 cm/s Anterior tibial artery: Unable to be obtained due to open wounds. Posterior tibial artery: Unable to be obtained due to open wounds. Dorsalis pedis artery: Unable to be obtained due to open wounds.      IMPRESSION: 1.  RIGHT LOWER EXTREMITY: ALMA is unable to be obtained due to wounds. The digital pressure is 26 mmHg which suggests poor wound healing potential. The duplex study reveals a large amount of arterial calcification. Monophasic waveforms are seen within the external iliac and common femoral artery suggesting a pelvic inflow stenosis. Brisk monophasic waveforms to the level of the popliteal artery without an additional identified high-grade stenosis. The below-knee arteries are not evaluated due to open wounds. 2.  LEFT LOWER EXTREMITY: Amputation.    US Lower Extremity Arterial rt    Result Date: 11/12/2024  EXAM: US LOWER EXTREMITY ARTERIAL RT, US ALMA DOPPLER NO EXERCISE, 1-2 LEVELS, RIGHT LOCATION: [facility] DATE: 11/12/2024 INDICATION: Eval PAD COMPARISON: None. TECHNIQUE: Duplex utilizing 2D gray-scale imaging, Doppler interrogation with color-flow and spectral waveform analysis. ALMA FINDINGS: RIGHT                                               Brachial: -- Ankle (PT): Unable to be obtained due to open wounds. Ankle (DP): Unable to be obtained due to open wounds. Digit: 26 Index: 0.21   LEFT Brachial: 123 Ankle (PT): Amputation. Ankle (DP): Amputation. Digit: Amputation. RIGHT  LOWER EXTREMITY ARTERIAL ASSESSMENT: External iliac artery: 135 cm/s Common femoral artery: 129 cm/s Profunda femoris artery: 80 cm/s SFA (proximal): 130 cm/s SFA (mid): 105 cm/s SFA (distal): 195 cm/s Popliteal artery: 181-182 cm/s Anterior tibial artery: Unable to be obtained due to open wounds. Posterior tibial artery: Unable to be obtained due to open wounds. Dorsalis pedis artery: Unable to be obtained due to open wounds.      IMPRESSION: 1.  RIGHT LOWER EXTREMITY: ALMA is unable to be obtained due to wounds. The digital pressure is 26 mmHg which suggests poor wound healing potential. The duplex study reveals a large amount of arterial calcification. Monophasic waveforms are seen within the external iliac and common femoral artery suggesting a pelvic inflow stenosis. Brisk monophasic waveforms to the level of the popliteal artery without an additional identified high-grade stenosis. The below-knee arteries are not evaluated due to open wounds. 2.  LEFT LOWER EXTREMITY: Amputation.        LABS:      Sodium   Date Value Ref Range Status   11/15/2024 136 135 - 145 mmol/L Final   11/14/2024 139 135 - 145 mmol/L Final   11/13/2024 139 135 - 145 mmol/L Final   02/22/2008 140 133 - 144 mmol/L Final   10/15/2007 141 133 - 144 mmol/L Final   09/18/2007 142 133 - 144 mmol/L Final     Urea Nitrogen   Date Value Ref Range Status   11/15/2024 16.8 8.0 - 23.0 mg/dL Final   11/14/2024 21.3 8.0 - 23.0 mg/dL Final   11/13/2024 23.3 (H) 8.0 - 23.0 mg/dL Final   06/28/2022 42 (H) 8 - 28 mg/dL Final   02/22/2008 15 7 - 30 mg/dL Final   10/15/2007 17 7 - 30 mg/dL Final   09/18/2007 14 7 - 30 mg/dL Final     Hemoglobin   Date Value Ref Range Status   11/15/2024 8.9 (L) 11.7 - 15.7 g/dL Final   11/14/2024 9.0 (L) 11.7 - 15.7 g/dL Final   11/13/2024 8.8 (L) 11.7 - 15.7 g/dL Final   02/22/2008 12.6 11.7 - 15.7 g/dL Final   09/18/2007 14.3 11.7 - 15.7 g/dL Final   06/16/2006 13.1 11.7 - 15.7 g/dL Final     Platelet Count   Date Value Ref  Range Status   11/15/2024 299 150 - 450 10e3/uL Final   11/14/2024 278 150 - 450 10e3/uL Final   11/13/2024 279 150 - 450 10e3/uL Final   02/22/2008 177 150 - 450 10e9/L Final   09/18/2007 220 150 - 450 10e9/L Final   06/16/2006 207 150 - 450 10e9/L Final     BNP   Date Value Ref Range Status   02/24/2006 21 5 - 100 pg/mL Final   12/07/2005 28 5 - 100 pg/mL Final     INR   Date Value Ref Range Status   11/13/2024 1.73 (H) 0.85 - 1.15 Final   11/12/2024 1.69 (H) 0.85 - 1.15 Final   08/07/2024 2.28 (H) 0.85 - 1.15 Final   06/16/2006 0.98 0.86 - 1.14 Final     INR (External)   Date Value Ref Range Status   07/25/2024 5.0 (A) 0.9 - 1.1 Final   07/22/2024 3.6  Final   07/17/2024 1.6 (A) 0.9 - 1.1 Final       30 minutes spent on the day of encounter doing chart review, history and exam, documentation, and further activities as noted.         Tierney Sanches MD, [facility]  VASCULAR SURGERY

## 2024-11-25 NOTE — ED NOTES
Expected Patient Referral to ED  2:43 PM    Referring Clinic/Provider:  NICOLAS Mcclellan    Reason for referral/Clinical facts:  Recent right leg amputation above the ankle, wound vac in place and a venous stasis ulcer to back of leg with wound vac.     Amputation stump looks good, but venous stasis ulcer on back of leg and started bleeding when vac removed (bleeding was stopped)  Now adaptic bandage looks embedded at venous stasis ulcer and needs to be removed by someone per visiting nurse.     Dr. Sanches is her surgeon but they couldn't get a hold of him today.    Recommendations provided:  Send to ED for further evaluation    Caller was informed that this institution does possess the capabilities and/or resources to provide for patient and should be transferred to our facility.    Discussed that if direct admit is sought and any hurdles are encountered, this ED would be happy to see the patient and evaluate.    Informed caller that recommendations provided are recommendations based only on the facts provided and that they responsible to accept or reject the advice, or to seek a formal in person consultation as needed and that this ED will see/treat patient should they arrive.      Stephanie Gonzalez MD  [facility] EMERGENCY ROOM  [address] [id number]  [phone]       Stephanie Gonzalez MD  11/25/24 8530

## 2024-11-25 NOTE — PROGRESS NOTES
Vascular Clinic Rooming Questions      Patient is here for post-op for  BKA post op .      BP (!) 133/93   Pulse 89   Temp 98  F (36.7  C)   SpO2 99%     The provider has been notified that the patient is wondering if she can have a PRN for pain medication    Questions patient would like addressed today are: N/A.    Refills are needed: N/A    Has homecare services and agency name:  No

## 2024-11-25 NOTE — ED PROVIDER NOTES
Emergency Department Encounter   NAME: Linda Loredo ; AGE: 79 year old female ; YOB: 1945 ; MRN: [id number] ; PCP: Louann Peraza Physician   ED PROVIDER: Lor Can PA-C    Evaluation Date & Time:   11/25/2024  3:22 PM    CHIEF COMPLAINT:  Wound Infection        Impression and Plan   FINAL IMPRESSION:    ICD-10-CM    1. Venous stasis ulcer of right calf, unspecified ulcer stage, unspecified whether varicose veins present (H)  I83.012     L97.219           ED Course and Medical Decision Making  Margaret is a 79 year old female presenting to the ED from [facility] ([facility]) for evaluation of increased right leg pain and wound check.  Patient underwent right leg above the ankle amputation with Dr. Sanches on 11/14/24. Patient has had a wound VAC in place over the amputation site as well as for chronic venous stasis ulcer on the back of her right calf.  Patient had follow-up in clinic earlier today with Dr. Sanches. Wound VACs and dressings were not removed as they did not have materials to replace it in clinic.  Patient is on vancomycin and cefepime through her PICC line in her right UE. I called [facility] to gain the rest of the history and spoke with Margaret's nurse for today. On returning to [facility] it was time for a dressing change so they removed both of her wound VACs that were placed 5 days ago. The wound on the stump came off quite easily and was well appearing. When they removed the wound VAC from her venous stasis ulcer on the back of the right leg there were adaptic dressings under the grey foam of the wound VAC. Per TCU staff they removed a layer of this and she began to bleed heavily from an area in the wound. They held pressure and were able to get the area to stop bleeding. They thought there was additional Adaptic gauze laying in the wound and sent her to the ER for removal and evaluation of this.  They also were able to confirm patient's methadone dose, she takes 5 mg 4 times per  day for pain. Patient endorses increased pain at the back of the leg the past day or so.  Denies any fevers, chills, nausea, or vomiting.     Vitals reviewed and unremarkable, she is afebrile temp 97.9 F is very anxious and is tearful due to pain. Differential diagnosis/emergent conditions considered and evaluated for includes but not limited to healing ulcer, cellulitis, postop infection, retained foreign body, bacteremia. The right lower extremity was initially wrapped in Kerlix.  This was removed and patient had severe pain with this. I squirted saline in and around the dressing to help with removal but the dressing still stuck to the wound and she was still yelling in pain.  Patient was given a dose of IV Dilaudid for pain relief following with minimal relief so I ordered her home dose of methadone.    The skin of the distal aspect of the leg is erythematous. Patient and her daughter note this appears more red than 5 days ago and patient is endorsing increased pain. There is a very large wound present on the backside of the right leg, this encompasses almost the entire posterior calf.  The center of the wound is dark red in color.  No drainage or visible abscess.  It is difficult to discern if there is Adaptic dressing present in the wound patient will hardly let me touch it without screaming in pain.  There is Adaptic dressing still present in the wound, patient will likely need sedation for debridement and removal.    She has a very mild leukocytosis of 11.2.  CRP is within normal range.  Patient does not meet SIRS criteria and I do not suspect sepsis.  BMP is without significant electrolyte abnormality.  Glucose 205. Given the increased the right lower extremity and patient's increased tenderness as well as concern for retained dressing will plan to admit patient at this point for pain management and for wound care and vascular surgery to see inpatient.  Patient and her daughter are understanding and  agreeable to this plan.        Supplemental history:  Obtained supplemental history:Supplemental history obtained?: Documented in chart and Family Member/Significant Other  Reviewed external records: External records reviewed?: No  Patient information was obtained from: patient  Use of Intrepreter: N/A     Complicating Factors:  Care impacted by chronic illness:Documented in Chart  Care significantly affected by social determinants of health:N/A    Work Up:  Did you consider but not order tests?: In addition to work-up documented, I considered the following work up: MR leg  Did you interpret images independently?: Independent interpretation of ECG and images noted in documentation, when applicable.    External Consults:  Consultation discussion with other provider: Dr. Sherwin Johnston.        ED COURSE:  1634 I met and introduced myself to the patient. I gathered initial history and performed my physical exam. We discussed plan for initial workup.   1747 I have staffed the patient with Dr. Courtney, ED MD, who has evaluated the patient and agrees with all aspects of today's care.   7:57 PM I spoke with Dr. Paniagua and she was admitted to medicine    At the conclusion of the encounter I discussed the results of all the tests and the disposition. The questions were answered. The patient or family acknowledged understanding and was agreeable with the care plan.          MEDICATIONS GIVEN IN THE EMERGENCY DEPARTMENT:  Medications   HYDROmorphone (PF) (DILAUDID) injection 0.5 mg (0.5 mg Intravenous $Given 11/25/24 1721)   methadone (DOLOPHINE) tablet 5 mg (5 mg Oral $Given 11/25/24 1834)         NEW PRESCRIPTIONS STARTED AT TODAY'S ED VISIT:  New Prescriptions    No medications on file         HPI       Pat is a 79 year old female presenting to the ED from [facility] for evaluation of increased right leg pain and wound check.  Patient underwent  right leg above the ankle amputation with Dr. Sanches on 11/14/24.  Patient has had a wound VAC in place over the amputation site as well as for chronic venous stasis ulcer on the back of her right calf.  Patient had follow-up in clinic earlier today with Dr. Sanches. Wound VACs and dressings were not removed as they did not have materials to replace it in clinic.  Patient is on vancomycin and cefepime through her PICC line in her right UE. I called [facility] to gain the rest of the history and spoke with Pat's nurse for today. On returning to TCU it was time for a dressing change so they removed both of her wound vacs that were placed 5 days ago. The wound on the stump came off quite easily and was well appearing. When they removed the wound VAC from her venous stasis ulcer on the back of the right leg there were adaptic dressings under the grey foam of the wound VAC. Per TCU staff they removed a layer of this and she began to bleed heavily from an area in the wound. They held pressure and were able to get the area to stop bleeding. They thought there was additional Adaptic gauze laying in the wound and sent her to the ER for removal and evaluation of this.  They also were able to confirm patient's methadone dose, she takes 5 mg 4 times per day for pain. Patient endorses increased pain at the back of the leg the past day or so.  Denies any fevers, chills, nausea, or vomiting.       Physical Exam     First Vitals:  Patient Vitals for the past 24 hrs:   BP Temp Temp src Pulse Resp SpO2 Weight   11/25/24 1900 134/64 -- -- -- -- -- --   11/25/24 1756 (!) 141/86 -- -- -- -- -- --   11/25/24 1707 -- -- -- -- -- 97 % --   11/25/24 1706 -- -- -- 113 -- 95 % --   11/25/24 1703 136/85 -- -- 98 -- 90 % --   11/25/24 1509 (!) 140/85 97.9  F (36.6  C) Oral 95 18 97 % 98.4 kg (217 lb)         PHYSICAL EXAM    General Appearance:  Alert, cooperative, no distress, appears stated age  Musculoskeletal: Moving all extremities. No gross deformities  Integument: Warm, dry.  Right lower extremity is her  third erythematous just below the knee joint.  She has significant tenderness to the posterior aspect of the right calf where there is a large ulcer that expands almost the entire posterior calf.  No visible abscess.  No purulent drainage.  Neurologic: Alert and orientated x3. No focal deficits.  Psych: Normal mood and affect                  Results     LAB:  All pertinent labs reviewed and interpreted  Labs Ordered and Resulted from Time of ED Arrival to Time of ED Departure   BASIC METABOLIC PANEL - Abnormal       Result Value    Sodium 138      Potassium 3.4      Chloride 96 (*)     Carbon Dioxide (CO2) 31 (*)     Anion Gap 11      Urea Nitrogen 32.3 (*)     Creatinine 0.88      GFR Estimate 66      Calcium 10.1      Glucose 205 (*)    CBC WITH PLATELETS AND DIFFERENTIAL - Abnormal    WBC Count 11.2 (*)     RBC Count 3.74 (*)     Hemoglobin 10.4 (*)     Hematocrit 33.3 (*)     MCV 89      MCH 27.8      MCHC 31.2 (*)     RDW 17.2 (*)     Platelet Count 239      % Neutrophils 76      % Lymphocytes 13      % Monocytes 6      % Eosinophils 4      % Basophils 1      % Immature Granulocytes 0      NRBCs per 100 WBC 0      Absolute Neutrophils 8.5 (*)     Absolute Lymphocytes 1.5      Absolute Monocytes 0.7      Absolute Eosinophils 0.4      Absolute Basophils 0.1      Absolute Immature Granulocytes 0.0      Absolute NRBCs 0.0     CRP INFLAMMATION - Normal    CRP Inflammation <3.00     PROCALCITONIN - Normal    Procalcitonin 0.14         RADIOLOGY:  No orders to display         ECG:  N/A      PROCEDURES:  N/A        Lor Can PA-C   Emergency Medicine   [facility] EMERGENCY ROOM       Lor Can PA-C  11/25/24 2004

## 2024-11-26 LAB
CREAT SERPL-MCNC: 0.78 MG/DL (ref 0.51–0.95)
EGFRCR SERPLBLD CKD-EPI 2021: 77 ML/MIN/1.73M2
ERYTHROCYTE [DISTWIDTH] IN BLOOD BY AUTOMATED COUNT: 17.2 % (ref 10–15)
GLUCOSE BLDC GLUCOMTR-MCNC: 143 MG/DL (ref 70–99)
GLUCOSE BLDC GLUCOMTR-MCNC: 143 MG/DL (ref 70–99)
GLUCOSE BLDC GLUCOMTR-MCNC: 162 MG/DL (ref 70–99)
GLUCOSE BLDC GLUCOMTR-MCNC: 184 MG/DL (ref 70–99)
GLUCOSE BLDC GLUCOMTR-MCNC: 240 MG/DL (ref 70–99)
GLUCOSE BLDC GLUCOMTR-MCNC: 87 MG/DL (ref 70–99)
HCT VFR BLD AUTO: 30.8 % (ref 35–47)
HGB BLD-MCNC: 9.7 G/DL (ref 11.7–15.7)
MCH RBC QN AUTO: 28 PG (ref 26.5–33)
MCHC RBC AUTO-ENTMCNC: 31.5 G/DL (ref 31.5–36.5)
MCV RBC AUTO: 89 FL (ref 78–100)
PLATELET # BLD AUTO: 211 10E3/UL (ref 150–450)
RBC # BLD AUTO: 3.46 10E6/UL (ref 3.8–5.2)
VANCOMYCIN SERPL-MCNC: 20.9 UG/ML
WBC # BLD AUTO: 6.9 10E3/UL (ref 4–11)

## 2024-11-26 PROCEDURE — 250N000011 HC RX IP 250 OP 636: Performed by: FAMILY MEDICINE

## 2024-11-26 PROCEDURE — 82565 ASSAY OF CREATININE: CPT | Performed by: FAMILY MEDICINE

## 2024-11-26 PROCEDURE — 99223 1ST HOSP IP/OBS HIGH 75: CPT | Performed by: PAIN MEDICINE

## 2024-11-26 PROCEDURE — 80202 ASSAY OF VANCOMYCIN: CPT | Performed by: FAMILY MEDICINE

## 2024-11-26 PROCEDURE — 250N000012 HC RX MED GY IP 250 OP 636 PS 637

## 2024-11-26 PROCEDURE — 250N000013 HC RX MED GY IP 250 OP 250 PS 637: Performed by: PAIN MEDICINE

## 2024-11-26 PROCEDURE — 99223 1ST HOSP IP/OBS HIGH 75: CPT | Mod: AI

## 2024-11-26 PROCEDURE — 250N000011 HC RX IP 250 OP 636

## 2024-11-26 PROCEDURE — 250N000009 HC RX 250: Performed by: PAIN MEDICINE

## 2024-11-26 PROCEDURE — 120N000004 HC R&B MS OVERFLOW

## 2024-11-26 PROCEDURE — 258N000003 HC RX IP 258 OP 636

## 2024-11-26 PROCEDURE — 999N000157 HC STATISTIC RCP TIME EA 10 MIN

## 2024-11-26 PROCEDURE — 85027 COMPLETE CBC AUTOMATED: CPT

## 2024-11-26 PROCEDURE — 250N000013 HC RX MED GY IP 250 OP 250 PS 637

## 2024-11-26 PROCEDURE — 36415 COLL VENOUS BLD VENIPUNCTURE: CPT | Performed by: FAMILY MEDICINE

## 2024-11-26 PROCEDURE — 82962 GLUCOSE BLOOD TEST: CPT

## 2024-11-26 PROCEDURE — G0463 HOSPITAL OUTPT CLINIC VISIT: HCPCS

## 2024-11-26 RX ORDER — HYDROMORPHONE HYDROCHLORIDE 2 MG/1
2-4 TABLET ORAL
Status: DISCONTINUED | OUTPATIENT
Start: 2024-11-26 | End: 2024-12-04 | Stop reason: HOSPADM

## 2024-11-26 RX ORDER — HYDROMORPHONE HCL IN WATER/PF 6 MG/30 ML
0.2 PATIENT CONTROLLED ANALGESIA SYRINGE INTRAVENOUS EVERY 8 HOURS PRN
Status: DISCONTINUED | OUTPATIENT
Start: 2024-11-26 | End: 2024-11-29

## 2024-11-26 RX ORDER — VANCOMYCIN HYDROCHLORIDE 1 G/200ML
1000 INJECTION, SOLUTION INTRAVENOUS EVERY 24 HOURS
Status: DISCONTINUED | OUTPATIENT
Start: 2024-11-27 | End: 2024-12-04 | Stop reason: HOSPADM

## 2024-11-26 RX ORDER — NALOXONE HYDROCHLORIDE 0.4 MG/ML
0.2 INJECTION, SOLUTION INTRAMUSCULAR; INTRAVENOUS; SUBCUTANEOUS
Status: DISCONTINUED | OUTPATIENT
Start: 2024-11-26 | End: 2024-12-04 | Stop reason: HOSPADM

## 2024-11-26 RX ORDER — POLYETHYLENE GLYCOL 3350 17 G/17G
17 POWDER, FOR SOLUTION ORAL DAILY
Status: DISCONTINUED | OUTPATIENT
Start: 2024-11-26 | End: 2024-12-04 | Stop reason: HOSPADM

## 2024-11-26 RX ORDER — NALOXONE HYDROCHLORIDE 0.4 MG/ML
0.4 INJECTION, SOLUTION INTRAMUSCULAR; INTRAVENOUS; SUBCUTANEOUS
Status: DISCONTINUED | OUTPATIENT
Start: 2024-11-26 | End: 2024-12-04 | Stop reason: HOSPADM

## 2024-11-26 RX ORDER — DEXTROSE MONOHYDRATE 25 G/50ML
25-50 INJECTION, SOLUTION INTRAVENOUS
Status: DISCONTINUED | OUTPATIENT
Start: 2024-11-26 | End: 2024-12-04 | Stop reason: HOSPADM

## 2024-11-26 RX ORDER — VANCOMYCIN HYDROCHLORIDE 1 G/200ML
1000 INJECTION, SOLUTION INTRAVENOUS EVERY 12 HOURS
Status: DISCONTINUED | OUTPATIENT
Start: 2024-11-26 | End: 2024-11-26

## 2024-11-26 RX ORDER — NICOTINE POLACRILEX 4 MG
15-30 LOZENGE BUCCAL
Status: DISCONTINUED | OUTPATIENT
Start: 2024-11-26 | End: 2024-12-04 | Stop reason: HOSPADM

## 2024-11-26 RX ORDER — LIDOCAINE 50 MG/G
OINTMENT TOPICAL 3 TIMES DAILY
Status: DISCONTINUED | OUTPATIENT
Start: 2024-11-26 | End: 2024-12-04 | Stop reason: HOSPADM

## 2024-11-26 RX ORDER — DOCUSATE SODIUM 100 MG/1
100 CAPSULE, LIQUID FILLED ORAL 2 TIMES DAILY
Status: DISCONTINUED | OUTPATIENT
Start: 2024-11-26 | End: 2024-12-04 | Stop reason: HOSPADM

## 2024-11-26 RX ORDER — HYDROMORPHONE HYDROCHLORIDE 2 MG/1
4 TABLET ORAL
Status: DISCONTINUED | OUTPATIENT
Start: 2024-11-26 | End: 2024-11-26

## 2024-11-26 RX ADMIN — METHADONE HYDROCHLORIDE 5 MG: 5 TABLET ORAL at 08:12

## 2024-11-26 RX ADMIN — METRONIDAZOLE 500 MG: 500 TABLET, FILM COATED ORAL at 08:12

## 2024-11-26 RX ADMIN — METRONIDAZOLE 500 MG: 500 TABLET, FILM COATED ORAL at 14:46

## 2024-11-26 RX ADMIN — Medication 60 ML: at 11:49

## 2024-11-26 RX ADMIN — METRONIDAZOLE 500 MG: 500 TABLET, FILM COATED ORAL at 21:39

## 2024-11-26 RX ADMIN — TORSEMIDE 60 MG: 20 TABLET ORAL at 09:14

## 2024-11-26 RX ADMIN — METHADONE HYDROCHLORIDE 5 MG: 5 TABLET ORAL at 21:38

## 2024-11-26 RX ADMIN — INSULIN ASPART 1 UNITS: 100 INJECTION, SOLUTION INTRAVENOUS; SUBCUTANEOUS at 21:48

## 2024-11-26 RX ADMIN — HYDROMORPHONE HYDROCHLORIDE 4 MG: 2 TABLET ORAL at 18:57

## 2024-11-26 RX ADMIN — DOCUSATE SODIUM 100 MG: 100 CAPSULE, LIQUID FILLED ORAL at 12:12

## 2024-11-26 RX ADMIN — APIXABAN 5 MG: 5 TABLET, FILM COATED ORAL at 21:38

## 2024-11-26 RX ADMIN — CEFEPIME 2 G: 2 INJECTION, POWDER, FOR SOLUTION INTRAVENOUS at 14:45

## 2024-11-26 RX ADMIN — ACETAMINOPHEN 975 MG: 325 TABLET ORAL at 08:11

## 2024-11-26 RX ADMIN — DOCUSATE SODIUM 100 MG: 100 CAPSULE, LIQUID FILLED ORAL at 21:39

## 2024-11-26 RX ADMIN — INSULIN DEGLUDEC 28 UNITS: 100 INJECTION, SOLUTION SUBCUTANEOUS at 09:13

## 2024-11-26 RX ADMIN — INSULIN ASPART 1 UNITS: 100 INJECTION, SOLUTION INTRAVENOUS; SUBCUTANEOUS at 12:15

## 2024-11-26 RX ADMIN — ACETAMINOPHEN 975 MG: 325 TABLET ORAL at 14:46

## 2024-11-26 RX ADMIN — HYDROMORPHONE HYDROCHLORIDE 4 MG: 2 TABLET ORAL at 14:45

## 2024-11-26 RX ADMIN — INSULIN ASPART 3 UNITS: 100 INJECTION, SOLUTION INTRAVENOUS; SUBCUTANEOUS at 00:44

## 2024-11-26 RX ADMIN — INSULIN ASPART 1 UNITS: 100 INJECTION, SOLUTION INTRAVENOUS; SUBCUTANEOUS at 09:13

## 2024-11-26 RX ADMIN — HYDROMORPHONE HYDROCHLORIDE 0.2 MG: 0.2 INJECTION, SOLUTION INTRAMUSCULAR; INTRAVENOUS; SUBCUTANEOUS at 09:08

## 2024-11-26 RX ADMIN — POLYETHYLENE GLYCOL 3350 17 G: 17 POWDER, FOR SOLUTION ORAL at 12:13

## 2024-11-26 RX ADMIN — VANCOMYCIN HYDROCHLORIDE 1000 MG: 1 INJECTION, SOLUTION INTRAVENOUS at 08:16

## 2024-11-26 RX ADMIN — SENNOSIDES 2 TABLET: 8.6 TABLET, FILM COATED ORAL at 09:13

## 2024-11-26 RX ADMIN — LIDOCAINE: 50 OINTMENT TOPICAL at 21:48

## 2024-11-26 RX ADMIN — METHADONE HYDROCHLORIDE 5 MG: 5 TABLET ORAL at 12:12

## 2024-11-26 RX ADMIN — PREGABALIN 25 MG: 25 CAPSULE ORAL at 08:12

## 2024-11-26 RX ADMIN — CEFEPIME 2 G: 2 INJECTION, POWDER, FOR SOLUTION INTRAVENOUS at 06:36

## 2024-11-26 RX ADMIN — SODIUM CHLORIDE: 9 INJECTION, SOLUTION INTRAVENOUS at 18:03

## 2024-11-26 RX ADMIN — METHADONE HYDROCHLORIDE 5 MG: 5 TABLET ORAL at 17:59

## 2024-11-26 RX ADMIN — CEFEPIME 2 G: 2 INJECTION, POWDER, FOR SOLUTION INTRAVENOUS at 22:14

## 2024-11-26 RX ADMIN — APIXABAN 5 MG: 5 TABLET, FILM COATED ORAL at 08:12

## 2024-11-26 RX ADMIN — ACETAMINOPHEN 975 MG: 325 TABLET ORAL at 21:38

## 2024-11-26 RX ADMIN — PREGABALIN 25 MG: 25 CAPSULE ORAL at 21:39

## 2024-11-26 RX ADMIN — HYDROMORPHONE HYDROCHLORIDE 4 MG: 2 TABLET ORAL at 08:12

## 2024-11-26 RX ADMIN — ATORVASTATIN CALCIUM 40 MG: 40 TABLET, FILM COATED ORAL at 21:40

## 2024-11-26 ASSESSMENT — ACTIVITIES OF DAILY LIVING (ADL)
ADLS_ACUITY_SCORE: 67
ADLS_ACUITY_SCORE: 59
ADLS_ACUITY_SCORE: 67
DEPENDENT_IADLS:: CLEANING;COOKING;LAUNDRY;SHOPPING;MEAL PREPARATION;MEDICATION MANAGEMENT;MONEY MANAGEMENT;TRANSPORTATION
ADLS_ACUITY_SCORE: 67
ADLS_ACUITY_SCORE: 67
ADLS_ACUITY_SCORE: 55
ADLS_ACUITY_SCORE: 67
ADLS_ACUITY_SCORE: 67
ADLS_ACUITY_SCORE: 55
ADLS_ACUITY_SCORE: 55
ADLS_ACUITY_SCORE: 67
ADLS_ACUITY_SCORE: 67
ADLS_ACUITY_SCORE: 55
ADLS_ACUITY_SCORE: 55
ADLS_ACUITY_SCORE: 67
ADLS_ACUITY_SCORE: 55
ADLS_ACUITY_SCORE: 55
ADLS_ACUITY_SCORE: 67
ADLS_ACUITY_SCORE: 55
ADLS_ACUITY_SCORE: 55
ADLS_ACUITY_SCORE: 67
ADLS_ACUITY_SCORE: 67
ADLS_ACUITY_SCORE: 55

## 2024-11-26 NOTE — PLAN OF CARE
Patient admitted today 11/26/24 arrived from the ED about 1240. Complained of pain in RLE amputee. Available pain medication given. Cold therapy applied. VSS. LS clear. Remained NPO. Voiding with pure wick in place. Plan to see Vascular surgery. Will monitor and continue plan of care.     Problem: Pain Acute  Goal: Optimal Pain Control and Function  Outcome: Progressing     Problem: Skin Injury Risk Increased  Goal: Skin Health and Integrity  Outcome: Progressing     Problem: Adult Inpatient Plan of Care  Goal: Absence of Hospital-Acquired Illness or Injury  Intervention: Identify and Manage Fall Risk  Recent Flowsheet Documentation  Taken 11/26/2024 1247 by Sudha Starr, RN  Safety Promotion/Fall Prevention:   safety round/check completed   room near nurse's station   nonskid shoes/slippers when out of bed   mobility aid in reach   lighting adjusted   increase visualization of patient   increased rounding and observation   clutter free environment maintained

## 2024-11-26 NOTE — PHARMACY-VANCOMYCIN DOSING SERVICE
Pharmacy Vancomycin Note  Date of Service 2024  Patient's  1945   79 year old, female    Indication: Skin and Soft Tissue Infection        Insight[employer] Prediction of Current Vancomycin Regimen    Regimen: 1250 mg IV every 24 hours.  Start time: 08:16 on 2024  Exposure target: AUC24 (range)400-600 mg/L.hr   AUC24,ss: 719 mg/L.hr  Probability of AUC24 > 400: 100 %  Ctrough,ss: 18.1 mg/L  Probability of Ctrough,ss > 20: 35 %  Probability of nephrotoxicity (Lodise COSTA ): 14 %      Current estimated CrCl = Estimated Creatinine Clearance: 69.2 mL/min (based on SCr of 0.78 mg/dL).    Creatinine for last 3 days  2024:  6:32 PM Creatinine 0.88 mg/dL  2024:  4:47 AM Creatinine 0.78 mg/dL    Recent Vancomycin Levels (past 3 days)  2024:  4:47 AM Vancomycin 20.9 ug/mL    Vancomycin IV Administrations (past 72 hours)        No vancomycin orders with administrations in past 72 hours.                    Nephrotoxins and other renal medications (From now, onward)      Start     Dose/Rate Route Frequency Ordered Stop    24 0830  vancomycin (VANCOCIN) 1,000 mg in NaCl 0.9% 200 mL intermittent infusion         1,000 mg  over 60 Minutes Intravenous EVERY 12 HOURS 24 0804      24 0800  torsemide (DEMADEX) tablet 60 mg        Note to Pharmacy: PTA Sig:Take 60 mg by mouth daily      60 mg Oral DAILY 24 4625                 Contrast Orders - past 72 hours (72h ago, onward)      None            Interpretation of levels and current regimen      Vanco started  - while in house at  then continued  -  at [facility] and resumed back at [facility] on 2024-    Has serum creatinine changed greater than 50% in last 72 hours: No    Urine output: good  Renal Function: Stable    InsightRX Prediction of Planned New Vancomycin Regimen    Regimen: 1000 mg IV every 24 hours.  Start time: 08:16 on 2024  Exposure target: AUC24 (range)400-600 mg/L.hr   AUC24,ss: 582  mg/L.hr  Probability of AUC24 > 400: 99 %  Ctrough,ss: 14.5 mg/L  Probability of Ctrough,ss > 20: 11 %  Probability of nephrotoxicity (Lodise COSTA 2009): 10 %        Plan    Increase vancomycin to 1000mg iv q24h  Vancomycin monitoring method: AUC  Vancomycin therapeutic monitoring goal: 400-600 mg*h/L  Pharmacy will check vancomycin levels as appropriate in 1-3 Days.  Serum creatinine levels will be ordered daily for the first week of therapy and at least twice weekly for subsequent weeks.    Elbert Yarbruogh [facility]

## 2024-11-26 NOTE — PHARMACY-VANCOMYCIN DOSING SERVICE
Pharmacy Vancomycin Initial Note  Date of Service 2024  Patient's  1945  79 year old, female    Indication: Skin and Soft Tissue Infection    Current estimated CrCl = Estimated Creatinine Clearance: 61.3 mL/min (based on SCr of 0.88 mg/dL).    Creatinine for last 3 days  2024:  6:32 PM Creatinine 0.88 mg/dL    Recent Vancomycin Level(s) for last 3 days  No results found for requested labs within last 3 days.      Vancomycin IV Administrations (past 72 hours)        No vancomycin orders with administrations in past 72 hours.                    Nephrotoxins and other renal medications (From now, onward)      Start     Dose/Rate Route Frequency Ordered Stop    24 1500  vancomycin (VANCOCIN) 1,250 mg in 0.9% NaCl 250 mL intermittent infusion        Note to Pharmacy: PTA Sig:Inject 1,250 mg over 90 minutes into the vein every 24 hours for 21 days. Weekly CBC with differential, BMP, CRP vancomycin trough/AUC      1,250 mg  over 90 Minutes Intravenous EVERY 24 HOURS 24 0800  torsemide (DEMADEX) tablet 60 mg        Note to Pharmacy: PTA Sig:Take 60 mg by mouth daily      60 mg Oral DAILY 24              Contrast Orders - past 72 hours (72h ago, onward)      None                Plan:  Continue vancomycin  1250 mg IV q24h.  Patient on prior to admit.  Will draw a level in AM for adjustment if needed.  Vancomycin monitoring method: AUC  Vancomycin therapeutic monitoring goal: 400-600 mg*h/L  Pharmacy will check vancomycin levels as appropriate in 1-3 Days.    Serum creatinine levels will be ordered daily for the first week of therapy and at least twice weekly for subsequent weeks.      Claude Harmon, [facility]

## 2024-11-26 NOTE — PROGRESS NOTES
[facility]    Progress Note - Hospitalist Service       Date of Admission:  11/25/2024    Assessment & Plan   Linda Loredo is a 79 year old female who has a history of poorly controlled DM2, HFpEF, paroxysmal atrial fibrillation, HTN, HLD, chronic lymphedema and venous stasis ulcers, and is admitted for pain management and complex wound care.     Venous stasis ulcer  Follows with [facility] wound care, pain is much worse now.  Ulcer of RLE has been present for 15 months (see photo of right calf dated 9/25/2023).  An excisional debridement of the area was performed in July 2024.  Low suspicion for acute infection given patient is on broad-spectrum antibiotic regimen and is afebrile with barely elevated WBCs, normal range procalcitonin and CRP, and absence of purulent or foul-smelling exudates. Patient remains in exquisite pain particularly related to dressing changes.  Patient has expressed desire to get surgery that allows her to be in less pain and address infection.  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  Cultures were polymicrobial and patient has documented history of ESBL and MRSA.  Continue PTA vancomycin, cefepime, metronidazole until stump closure per ID  Routine cares per Vascular, WOC instructions     Type 2 diabetes mellitus  A1c 8.4%.   Very long history of above goal A1c. Sees [facility].  Previously was taking Ozempic, but now only takes  "insulin; endocrinology notes reviewed but reasoning behind this was not clear.  Patient reports being overdue for endocrine follow-up.  Will continue same insulin regimen has previous admissions.  Tresiba 28 units every morning  Medium sliding scale insulin      Chronic stable problems  Paroxysmal atrial fibrillation: Continue PTA apixaban  Anemia of chronic disease: Noted  Hyperlipidemia: Continue PTA atorvastatin  Lymphedema, HFpEF: Continue PTA torsemide  CKD 3: Noted, trend creatinine            Diet: NPO for Medical/Clinical Reasons Except for: Meds, Ice Chips    DVT Prophylaxis: DOAC  Braga Catheter: Not present  Fluids: IV NS @ 125 mL/hr   Lines: PRESENT      PICC 11/18/24 Single Lumen Right Basilic Antibiotic-Site Assessment: WDL      Cardiac Monitoring: None  Code Status: Full Code      Clinically Significant Risk Factors Present on Admission          # Hypochloremia: Lowest Cl = 96 mmol/L in last 2 days, will monitor as appropriate         # Drug Induced Coagulation Defect: home medication list includes an anticoagulant medication    # Hypertension: Noted on problem list      # Anemia: based on hgb <11      # DMII: A1C = 8.4 % (Ref range: <5.7 %) within past 6 months   # Obesity: Estimated body mass index is 35.02 kg/m  as calculated from the following:    Height as of 11/13/24: 1.676 m (5' 6\").    Weight as of this encounter: 98.4 kg (217 lb).         # Financial/Environmental Concerns: none         Social Drivers of Health    Received from [facility], [facility]    Social Connections         Disposition Plan     Medically Ready for Discharge: Anticipated in 2-4 Days         The patient's care was discussed with the Attending Physician, Dr. Mariana Tran .    Burt Gibson DO  Hospitalist Service  [facility]  Securely message with [employer] (more info)  Text page via [employer] Paging/Directory "   ______________________________________________________________________    Interval History   No acute overnight events.  Patient is on the phone with her daughter, Lu.  Patient is in exquisite amount of pain, particularly when dressings are removed.  She has expressed that she can no longer deal with the pain.  Lengthy discussion with wound care nurse at bedside.  Patient relayed understanding of her situation, and would like to proceed with any surgery if that would allow her to be in less pain and control infection.    Physical Exam   Vital Signs: Temp: 97.8  F (36.6  C) Temp src: Oral BP: 136/65 Pulse: 77   Resp: 18 SpO2: 100 % O2 Device: None (Room air)    Weight: 217 lbs 0 oz    Constitutional: awake, alert, cooperative, tearful, intermittent episodes of acute distress due to pain, and appears stated age  ENT: Normocephalic, without obvious abnormality, atraumatic, sinuses nontender on palpation, external ears without lesions, oral pharynx with moist mucous membranes, poor dentition, tonsils without erythema or exudates, gums normal and good dentition.  Respiratory: No increased work of breathing, good air exchange, clear to auscultation bilaterally, no crackles or wheezing  Cardiovascular: Normal apical impulse, irregular rate and rhythm, normal S1 and S2, no S3 or S4, and no murmur noted  GI: No scars, normal bowel sounds, soft, non-distended, non-tender, no masses palpated, no hepatosplenomegally  Musculoskeletal: Left BKA.  Right BKA with posterolateral venous stasis ulcer, anterior superficial skin necrosis, and open distal wound.  Wet bandages applied.    Data     I have personally reviewed the following data over the past 24 hrs:    6.9  \   9.7 (L)   / 211     138 96 (L) 32.3 (H) /  143 (H)   3.4 31 (H) 0.78 \     Procal: 0.14 CRP: <3.00 Lactic Acid: N/A

## 2024-11-26 NOTE — PLAN OF CARE
Goal Outcome Evaluation:      Plan of Care Reviewed With: patient, child, family    Overall Patient Progress: no changeOverall Patient Progress: no change    Outcome Evaluation: return to [facility] when medically ready

## 2024-11-26 NOTE — CONSULTS
Care Management Initial Consult    General Information  Assessment completed with: -chart review, Family, Patient,    Type of CM/SW Visit: Initial Assessment    Primary Care Provider verified and updated as needed: Yes (Uses PCP at facility, wants to establish provider in community)   Readmission within the last 30 days: previous discharge plan unsuccessful   Return Category: Exacerbation of disease  Reason for Consult: discharge planning  Advance Care Planning: Advance Care Planning Reviewed: present on chart          Communication Assessment  Patient's communication style: spoken language (English or Bilingual)             Cognitive  Cognitive/Neuro/Behavioral: mood/behavior, .WDL except                      Living Environment:   People in home: facility resident     Current living Arrangements: other (see comments) (From [facility])  Name of Facility: [facility]   Able to return to prior arrangements: yes       Family/Social Support:  Care provided by: self, other (see comments) (staff at [facility])  Provides care for: no one, unable/limited ability to care for self  Marital Status:   Support system: Children          Description of Support System: Supportive, Involved         Current Resources:   Patient receiving home care services: No        Community Resources: Transitional Care  Equipment currently used at home: wheelchair, manual  Supplies currently used at home: Diabetic Supplies, Wound Care Supplies    Employment/Financial:  Employment Status: retired        Financial Concerns: none   Referral to Financial Worker: No       Does the patient's insurance plan have a 3 day qualifying hospital stay waiver?  No    Lifestyle & Psychosocial Needs:  Social Drivers of Health     Food Insecurity: Low Risk  (11/13/2024)    Food Insecurity     Within the past 12 months, did you worry that your food would run out before you got money to buy more?: No     Within the past 12 months, did the food you  bought just not last and you didn t have money to get more?: No   Depression: Not at risk (9/12/2023)    Received from [facility], [facility]    PHQ-2     PHQ-2 TOTAL SCORE: 1   Housing Stability: Low Risk  (11/13/2024)    Housing Stability     Do you have housing? : Yes     Are you worried about losing your housing?: No   Tobacco Use: Low Risk  (11/14/2024)    Patient History     Smoking Tobacco Use: Never     Smokeless Tobacco Use: Never     Passive Exposure: Not on file   Financial Resource Strain: Low Risk  (11/13/2024)    Financial Resource Strain     Within the past 12 months, have you or your family members you live with been unable to get utilities (heat, electricity) when it was really needed?: No   Alcohol Use: Not on file   Transportation Needs: Low Risk  (11/13/2024)    Transportation Needs     Within the past 12 months, has lack of transportation kept you from medical appointments, getting your medicines, non-medical meetings or appointments, work, or from getting things that you need?: No   Physical Activity: Not on file   Interpersonal Safety: Low Risk  (11/13/2024)    Interpersonal Safety     Do you feel physically and emotionally safe where you currently live?: Yes     Within the past 12 months, have you been hit, slapped, kicked or otherwise physically hurt by someone?: No     Within the past 12 months, have you been humiliated or emotionally abused in other ways by your partner or ex-partner?: No   Stress: Not on file   Social Connections: Unknown (3/9/2023)    Received from [facility], [facility]    Social Connections     Frequency of Communication with Friends and Family: Not on file   Health Literacy: Not on file       Functional Status:  Prior to admission patient needed assistance:   Dependent ADLs:: Bathing, Dressing, Wheelchair-with assist,  Toileting  Dependent IADLs:: Cleaning, Cooking, Laundry, Shopping, Meal Preparation, Medication Management, Money Management, Transportation  Assesssment of Functional Status: At functional baseline    Mental Health Status:  Mental Health Status: No Current Concerns       Chemical Dependency Status:  Chemical Dependency Status: No Current Concerns             Values/Beliefs:  Spiritual, Cultural Beliefs, Mandaeism Practices, Values that affect care: no               Discussed  Partnership in Safe Discharge Planning  document with patient/family: Yes    Additional Information:  SW met with pt at bedside along with dtr by phone to complete consult for discharge planning. Pt reports currently having bedhold at [facility] while in hospital. Pt plans to return at discharge. Pt previously living at [facility] but lost her apt and waiver due to extended hospitalizations. Pt has all cares provided by TCU staff including wound care. Pt has customized wheelchair that supports LLE amputation but will need to transition to a new chair due to additional medical interventions that require more support than current wheelchair. Pt would eventually like to return to [facility] setting with [facility] support if appropriate. Pt uses agency transportation for discharge return to TCU. Pt reports wants to eventually establish care with a community PCP when she achives lower level of care.    SW will continue to follow for discharge planning    Next Steps: complete consults, discharge when clinically ready    LIZY Sanon

## 2024-11-26 NOTE — CONSULTS
[facility]  WO Nurse Inpatient Assessment     Consulted for: R BKA/Calf wound    Summary: patient is well known to woc team    Patient History (according to provider note(s):      Assessment & Plan  Linda Loredo is a 79 year old female who has a history of poorly controlled DM2, HFpEF, paroxysmal atrial fibrillation, HTN, HLD, chronic lymphedema and venous stasis ulcers, and is admitted for pain management and complex wound care.     Venous stasis ulcer  Longstanding problem for which patient follows with [facility] wound care, however patient reports pain is worse now.  The ulcer, which is on her right calf, has been present to some degree for at least 1 year and 3 months (see photo of right calf dated 9/25/2023).  An excisional debridement of the area was performed in July 2024.  Patient is admitted for pain control due to inability to tolerate dressing changes and concern for retained gauze in the wound.  Low suspicion for acute infection given patient is on broad-spectrum antibiotic regimen and is afebrile with barely elevated WBCs, normal range procalcitonin and CRP, and absence of purulent or foul-smelling exudates.  Vascular surgery and WOC consult, assistance and recommendations appreciated  Acetaminophen scheduled 975 mg 3 times daily  PTA methadone 5 mg 4 times daily  PTA pregabalin 25 mg twice daily  PTA Dilaudid 2-4 mg every 3 hours as needed for severe pain  0.2 mg IV Dilaudid every 2 hours as needed for breakthrough pain, reserved for dressing changes     NELDA CRUZ 11/14/24  Seen by vascular surgery in clinic morning day of admission and was assessed to be doing well.  Cultures were polymicrobial and patient has documented history of ESBL and MRSA.  Continue PTA vancomycin, cefepime, metronidazole until stump closure per ID  Routine cares per Vascular, WOC instructions     Type 2 diabetes mellitus  A1c 8.4%.   Very long history of above goal A1c. Sees [facility]  endocrinology.  Previously was taking Ozempic, but now only takes insulin; endocrinology notes reviewed but reasoning behind this was not clear.  Possibly this was held during a previous hospital admission and inadvertently never resumed.  Patient reports being overdue for endocrine follow-up.  Sugars were reasonably well-controlled during previous admissions so we will start with same insulin regimen.  Tresiba 28 units every morning  Medium sliding scale insulin      Chronic stable problems  Paroxysmal atrial fibrillation: Continue PTA apixaban  Anemia of chronic disease: Noted  Hyperlipidemia: Continue PTA atorvastatin  Lymphedema, HFpEF: Continue PTA torsemide  CKD 3: Noted, trend creatinine    Assessment:      No pictures taken and only a quick assessment was done as the patient was having significant pain. Calf wound rigo wit wallace glassy appearance in muc hof the wound bed. 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 Patient awaiting for vascular team to assess wound; IM team was in room as well. Patient agreed to having a vac placed tomorrow on a W - M - Th schedule (about every 5 days) to reduce pain for patient. We are awaiting pain team as well for creative solutions. Patient's daughter was on phone as well. We discussed state of the wound; some cotton webbing debris noted in calf wound, some purpura noted to calf as well. We discussed blood flow and healing, daughter asked questions about BKA vs AKA and we discussed as much as woc could and advised to ask these questions to the vascular team. Patient is very focused on ending the pain first and foremost.     More complete assessment will be done tomorrow once pain is more controlled and patient has a better plan for care.        Treatment Plan:     RLE  1. Soak leg with sterile water to saturate dressings and help with removal. Remove old dressings and place a new dry chux.   2. moisten 4x4 gauze with vashe and lay them out on new chux pad to the  size of the wound so patient can set the calf wound down on the moist dressings. Cover bka with moist vashe gauze as well  3. cover by gently folding chux around leg  4. Change BID til vac placement. [facility] will place vac wed in the afternoon.  5. Call MD for wound decline      Orders: Written    RECOMMEND PRIMARY TEAM ORDER: Vascular consult  Education provided: importance of repositioning, plan of care, and Off-loading pressure  Discussed plan of care with: Patient, Family, Nurse, and Physician  [facility] nurse follow-up plan: twice weekly  Notify WOC if wound(s) deteriorate.  Nursing to notify the Provider(s) and re-consult the WO Nurse if new skin concern.    DATA:     Current support surface: Standard  ED cart  Containment of urine/stool: Incontinence Protocol  BMI: Body mass index is 35.02 kg/m .   Active diet order: Orders Placed This Encounter      NPO for Medical/Clinical Reasons Except for: Meds, Ice Chips     Output: I/O last 3 completed shifts:  In: 240 [P.O.:240]  Out: 775 [Urine:775]     Labs:   Recent Labs   Lab 11/26/24  0447 11/25/24  1832 11/19/24  1003   HGB 9.7*   < >  --    WBC 6.9   < >  --    A1C  --   --  8.4*    < > = values in this interval not displayed.     Pressure injury risk assessment:   Sensory Perception: 3-->slightly limited  Moisture: 3-->occasionally moist  Activity: 2-->chairfast  Mobility: 2-->very limited  Nutrition: 3-->adequate  Friction and Shear: 1-->problem  Suresh Score: 14    ZAMZAM Lindsay RN CWOCN  Pager no longer in use, please contact through [employer] group: [facility] [employer] [facility]

## 2024-11-26 NOTE — CONSULTS
"Margaret requested to see a  when she was in ED. She was admitted to [facility].    When I arrived she was sitting up in bed, talking to a friend on the phone. She finished the call and expressed her appreciation for my  visit. Margaret's Muslim tamiko is a source of strength and comfort. She is a former member of [employer] in [place]. Margaret now lives in [place]. Her only child, Lu, lives in [place]. Margaret understands tamiko to be the bedrock of a meaningful life.     She seemed to have a need to show and tell me about her wounds and pain. There are several pictures on her phone. I believe Margaret has a curiosity and wonderment about the body. She is ready to \"be done with pain.\" The vascular doctor is to come today and give his opinion. Margaret would like her infected leg amputated measuring even with her healed stump.    Her dtr called and our visit ended. I offered a short blessing. I will follow as able.     ALBER [facility] Div.    "

## 2024-11-26 NOTE — ED NOTES
Pt changed in bed, was incontinent of urine. Purewick in place. Pain medicine administered as well.

## 2024-11-26 NOTE — PLAN OF CARE
Patient refusing to have wound bandaged.  We were allowed to cover the wounds with clean pillow cases as there is still serosanguinous drainage.  Q4 BS checks while NPO, she has been NPO since midnight.  She becomes easily confused so she writes everything down in her tablet she keeps bedside.  Pain is currently being controlled with ordered medications.  She is very guarded when trying to assess her wounds.  Purewick in place with good output.

## 2024-11-26 NOTE — MEDICATION SCRIBE - ADMISSION MEDICATION HISTORY
Medication Scribe Admission Medication History    Admission medication history is complete. The information provided in this note is only as accurate as the sources available at the time of the update.    Information Source(s): Facility ([facility]/) medication list/MAR via in-person    Pertinent Information: MAR received from [facility].     Currently on IV cefepime and vancomycin. Last cefepime dose was at 0800. Last vancomycin dose at 1435 today. Also currently taking metronidazole 500 mg TID.     Changes made to PTA medication list:  Added:   Insulin aspart pen  Deleted:   Lidocaine 5% ointment  Changed:   Hydromorphone 2-4 mg per dose --> 2 mg per dose (frequency remains the same)  Expedite liquid once daily --> TID with meals    Allergies reviewed with patient and updates made in EHR: yes    Medication History Completed By: Ovidio Richmond 11/25/2024 7:21 PM    PTA Med List   Medication Sig Note Last Dose/Taking    acetaminophen (TYLENOL) 325 MG tablet Take 3 tablets (975 mg) by mouth 3 times daily.  11/25/2024 at  8:00 AM    apixaban ANTICOAGULANT (ELIQUIS) 5 MG tablet Take 1 tablet (5 mg) by mouth 2 times daily.  11/25/2024 at  8:00 AM    atorvastatin (LIPITOR) 40 MG tablet Take 1 tablet (40 mg) by mouth every evening  11/24/2024 at  8:00 PM    calcium carbonate (TUMS) 500 MG chewable tablet Take 1 tablet (500 mg) by mouth 4 times daily as needed for heartburn.  Unknown    ceFEPIme (MAXIPIME) 2 g vial Inject 2 g over 30 minutes into the vein every 8 hours for 21 days. 11/25/2024: 0000, 0800, and 1600 11/25/2024 at  8:00 AM    HYDROmorphone (DILAUDID) 2 MG tablet Take 1-2 tablets (2-4 mg) by mouth every 3 hours as needed for breakthrough pain. (Patient taking differently: Take 2 mg by mouth every 3 hours as needed for breakthrough pain.)  11/25/2024 at  7:04 AM    Insulin Aspart FlexPen 100 UNIT/ML SOPN Inject 5 Units subcutaneously 3 times daily (before meals).  11/24/2024 at  9:00 PM    insulin  degludec (TRESIBA) 200 UNIT/ML pen Inject 34 Units subcutaneously every morning Hold for BG < 100  11/25/2024 at  7:00 AM    melatonin 1 MG TABS tablet Take 1 tablet (1 mg) by mouth nightly as needed for sleep  Unknown    methadone (DOLOPHINE) 5 MG tablet Take 1 tablet (5 mg) by mouth 4 times daily. 11/25/2024: 0800, 1200, 1600, 2000 11/25/2024 at 12:00 PM    metroNIDAZOLE (FLAGYL) 500 MG tablet Take 1 tablet (500 mg) by mouth 3 times daily. 11/25/2024: 0800, 1300, 2000 11/25/2024 at  8:00 AM    mineral oil-hydrophilic petrolatum (AQUAPHOR) external ointment Apply to left thigh wound BID  11/24/2024 Evening    ondansetron (ZOFRAN ODT) 4 MG ODT tab Take 1 tablet (4 mg) by mouth every 6 hours as needed for nausea or vomiting.  Unknown    polyethylene glycol (MIRALAX) 17 g packet Take 17 g by mouth 2 times daily as needed for constipation.  Unknown    pregabalin (LYRICA) 25 MG capsule Take 1 capsule (25 mg) by mouth 2 times daily.  11/25/2024 at  8:00 AM    senna-docusate (SENOKOT-S/PERICOLACE) 8.6-50 MG tablet Take 1 tablet by mouth 2 times daily as needed for constipation.  Unknown    sennosides (SENOKOT) 8.6 MG tablet Take 2 tablets by mouth 2 times daily.  11/25/2024 at  8:00 AM    torsemide (DEMADEX) 20 MG tablet Take 60 mg by mouth daily  11/25/2024 at  8:00 AM    vancomycin (VANCOCIN) 1250 mg/250 mL IVPB Inject 1,250 mg over 90 minutes into the vein every 24 hours for 21 days. Weekly CBC with differential, BMP, CRP vancomycin trough/AUC  11/25/2024 at  2:35 PM    wound support modular (EXPEDITE) LIQD bottle Take 60 mLs by mouth daily (Patient taking differently: Take 60 mLs by mouth 3 times daily. With meals)  11/25/2024 at 10:30 AM    Zinc oxide 10 % CREA Externally apply topically 3 times daily Apply to coccyx area once per shift after brief changes  11/25/2024 Morning

## 2024-11-26 NOTE — ED NOTES
Pt and family updated with the plan of care regarding admission. Denied further needs at this time.

## 2024-11-26 NOTE — H&P
[facility]    History and Physical - Hospitalist Service       Date of Admission:  11/25/2024    Assessment & Plan      Linda Loredo is a 79 year old female who has a history of poorly controlled DM2, HFpEF, paroxysmal atrial fibrillation, HTN, HLD, chronic lymphedema and venous stasis ulcers, and is admitted for pain management and complex wound care.    Venous stasis ulcer  Longstanding problem for which patient follows with [facility] wound care, however patient reports pain is worse now.  The ulcer, which is on her right calf, has been present to some degree for at least 1 year and 3 months (see photo of right calf dated 9/25/2023).  An excisional debridement of the area was performed in July 2024.  Patient is admitted for pain control due to inability to tolerate dressing changes and concern for retained gauze in the wound.  Low suspicion for acute infection given patient is on broad-spectrum antibiotic regimen and is afebrile with barely elevated WBCs, normal range procalcitonin and CRP, and absence of purulent or foul-smelling exudates.  Vascular surgery and WOC consult, assistance and recommendations appreciated  Acetaminophen scheduled 975 mg 3 times daily  PTA methadone 5 mg 4 times daily  PTA pregabalin 25 mg twice daily  PTA Dilaudid 2-4 mg every 3 hours as needed for severe pain  0.2 mg IV Dilaudid every 2 hours as needed for breakthrough pain, reserved for dressing changes    NELDA CRUZ 11/14/24  Seen by vascular surgery in clinic morning day of admission and was assessed to be doing well.  Cultures were polymicrobial and patient has documented history of ESBL and MRSA.  Continue PTA vancomycin, cefepime, metronidazole until stump closure per ID  Routine cares per Vascular, WOC instructions    Type 2 diabetes mellitus  A1c 8.4%.   Very long history of above goal A1c. Sees [facility].  Previously was taking Ozempic, but now only takes insulin;  "endocrinology notes reviewed but reasoning behind this was not clear.  Possibly this was held during a previous hospital admission and inadvertently never resumed.  Patient reports being overdue for endocrine follow-up.  Sugars were reasonably well-controlled during previous admissions so we will start with same insulin regimen.  Tresiba 28 units every morning  Medium sliding scale insulin     Chronic stable problems  Paroxysmal atrial fibrillation: Continue PTA apixaban  Anemia of chronic disease: Noted  Hyperlipidemia: Continue PTA atorvastatin  Lymphedema, HFpEF: Continue PTA torsemide  CKD 3: Noted, trend creatinine        Diet: NPO for Medical/Clinical Reasons Except for: Meds, Ice Chips  DVT Prophylaxis: DOAC  Braga Catheter: Not present  Fluids: IV NS @ 125ml/hr  Lines: PRESENT         Cardiac Monitoring: None  Code Status: Full Code    Clinically Significant Risk Factors Present on Admission          # Hypochloremia: Lowest Cl = 96 mmol/L in last 2 days, will monitor as appropriate         # Drug Induced Coagulation Defect: home medication list includes an anticoagulant medication    # Hypertension: Noted on problem list      # Anemia: based on hgb <11      # DMII: A1C = 8.4 % (Ref range: <5.7 %) within past 6 months   # Obesity: Estimated body mass index is 35.02 kg/m  as calculated from the following:    Height as of 11/13/24: 1.676 m (5' 6\").    Weight as of this encounter: 98.4 kg (217 lb).         # Financial/Environmental Concerns:           Disposition Plan      Expected Discharge Date: 11/26/2024                Patient discussed with overnight attending Dr. Tracy Tomlin.  Patient to be seen in the morning by daytime attending Dr. Mariana Tran.    Lavon Pittman MD, MEd  Resident Physician (PGY-2)  [facility] - [facility]  Securely message with [facility] (more info)  Text page via [facility] Paging/Directory " "  ______________________________________________________________________    Chief Complaint   Right lower extremity venous stasis ulcer    History of Present Illness   Linda Loredo is a 79 year old female who has a history of poorly controlled DM2, HFpEF, paroxysmal atrial fibrillation, HTN, HLD, chronic lymphedema and venous stasis ulcers, and is admitted for pain management and complex wound care.    Patient was previously admitted to the service from 11/12/2024 through 11/18/2024 for a complicated right foot ulcer with delayed presentation to care involving underlying necrosis and osteomyelitis of multiple bones, for which a BKA was performed.  Closure surgery had been planned for 2 weeks after the amputation.  She was discharged to TCU on IV vancomycin and cefepime and p.o. Flagyl with a single-lumen PICC line.    Earlier on the day of admission she presented for follow-up at vascular surgery clinic with  for routine postop care and was assessed to be doing well.  The wound vac and dressing were not removed at this appointment. In the afternoon she had a dressing change, at which time the TCU sent the patient to the ED out of concern for a bleeding venous stasis ulcer on the right lower extremity.  The dressing could not be changed nor the wound VAC removed at the facility so patient was referred to ED for \"removal of foreign body and venous wound\".    In the ED, the wound was not actively bleeding, but was surrounded by an erythematous margin that appeared to be worsening. There was concern that the wound was impacted with some dressing debris.  Patient was not able to tolerate any sort of dressing and asked that the wound be left covered only with a pillowcase.  She usually gets as needed Dilaudid for dressing changes but says the last few times this has been unhelpful.    Past Medical History    Past Medical History:   Diagnosis Date    Abscess of left foot 10/31/2022    Acute cystitis " without hematuria 07/11/2024    Acute kidney injury (H) 07/11/2024    Adverse effect of anticoagulants, initial encounter 04/16/2024    GUSTAVO (acute kidney injury) (H) 05/26/2023    Allergic rhinitis 04/08/2008    Anemia 07/11/2024    Anticoagulation monitoring, INR range 2-3 07/06/2022    Anxiety 10/20/2011    Cardiac murmur, unspecified 05/29/2019    Cellulitis - bilateral lower extremities 05/27/2023    Cellulitis of buttock 05/26/2023    Chronic atrial fibrillation (H) 05/30/2022    New onset during 5/2022 admission      Chronic kidney disease, stage 3b (H) 06/12/2024    Chronic pain 04/26/2010    Chronic right-sided heart failure (H) 11/16/2023    Constipation 04/16/2024    Decubitus ulcers - 5 present on buttocks/perineal region, numerous scabbed over and unstagable on shin and bilateral feet with some bloody blisters noted on feet 05/27/2023    Depressive disorder, not elsewhere classified     Diabetic foot ulcer (H) 09/29/2023    Diabetic polyneuropathy associated with type 2 diabetes mellitus (H) 04/07/2006 February 26, 2007: on gabapentin.  She has been switched from gabapentin to lyrica.       Elevated liver enzymes 10/20/2011    Essential hypertension with goal blood pressure less than 140/90 05/09/2005    Fibromyalgia 10/20/2011    Fracture of fibula, distal, left, closed 10/20/2011    ORIF 10/13/11      Herpes zoster 06/20/2005 February 26, 2007: On gabapentin and lidoderm. She has been switched from gabapentin to lyrica.       Herpes zoster without mention of complication     Hx of osteomyelitis 04/16/2024    S.p left BKA 2023      Hypercalcemia 02/24/2007    Hypoglycemia 05/27/2023    Hypotension 10/27/2011    Insomnia 07/15/2005    Lymphedema, not elsewhere classified 02/09/2024    Major depressive disorder, recurrent episode, moderate (H) 04/08/2008    Metabolic encephalopathy 07/11/2024    Microalbuminuria due to type 2 diabetes mellitus (H) 10/25/2016    Mild intermittent asthma     Mixed  hyperlipidemia 2005    Mixed hyperlipidemia due to type 2 diabetes mellitus (H) 2008    Formatting of this note is different from the original.     22 ASCVD 10 year risk: 37.9%      Last Lipids:  Last Lipids:  Chol: 2021 130   63  HDL: 2021 46  Non-HDL: 2021 84  Chol/HDL Ratio: 2021 2.83  LDL DIRECT: . No results found in past 5 years   LDL CHOLESTEROL (mg/dL)   Date Value   2021 71      22   The 10-year ASCVD risk score (Whitefieldjaney SMITH Jr.    Mixed stress and urge urinary incontinence 2005: On Detrol LA.      Non-healing wound of left heel 2023    Obesity with BMI >35 and comorbidity 2006: Body mass index is 48.07 kg/(m^2).       Opioid dependence, uncomplicated (H) 2023    BERLIN (obstructive sleep apnea) 10/23/2007    Sleep study 2004 reportedly showing severe OBSTRUCTIVE SLEEP APNEA and recommend CPAP, Not available for review. Patient is untreated       Osteoarthritis 2006    Osteomyelitis (H) 10/24/2023    Osteopenia 2015    Other abnormalities of gait and mobility 11/15/2023    Other urinary incontinence     Pressure injury of deep tissue of sacral region 2024    Primary hyperparathyroidism (H) 2013    Work up 2013 Dr. Villareal      Pulmonary hypertension (H) 2024    Pulmonary hypertension by echo and mild to moderate pulmonary hypertension by angiogram       Sepsis without acute organ dysfunction, due to unspecified organism (H) 2023    Sepsis, due to unspecified organism, unspecified whether acute organ dysfunction present (H) 2024    Skin ulcer of right lower leg with fat layer exposed (H) 2024    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) 2024    left lower extremity amputation secondary to osteomyelitis 10/2023, bone biopsy grew MRSA.       Supratherapeutic INR 2023    Type 2 diabetes mellitus with  complication, with long-term current use of insulin (H) 04/18/2005    diagnosed in 2001       Type II or unspecified type diabetes mellitus with renal manifestations, uncontrolled(250.42) (H)     Type II or unspecified type diabetes mellitus without mention of complication, not stated as uncontrolled     Umbilical hernia without obstruction and without gangrene 12/13/2018    Unspecified essential hypertension     Unspecified open wound, left foot, subsequent encounter 09/22/2023    UTI (urinary tract infection) - possible 05/26/2023    Venous stasis 04/16/2024    Vitamin D deficiency 09/08/2022    Warfarin-induced coagulopathy (H) 05/27/2023       Past Surgical History   Past Surgical History:   Procedure Laterality Date    AMPUTATE LEG BELOW KNEE Left 10/7/2023    Procedure: LEFT GUILLOTINE BELOW KNEE AMPUTATION.;  Surgeon: Lan Otto MD;  Location: [facility] OR    AMPUTATE LEG BELOW KNEE Left 10/14/2023    Procedure: debridement of left below the knee amputation;  Surgeon: Lan Otto MD;  Location: [facility] OR    AMPUTATION, LOWER EXTREMITY, USING GUILLOTINE TECHNIQUE Right 11/14/2024    Procedure: AMPUTATION, RIGHT LOWER EXTREMITY, USING GUILLOTINE TECHNIQUE;  Surgeon: Tierney Sanches MD;  Location: [facility] OR    APPLY WOUND VAC Left 1/2/2024    Procedure: WOUND VAC APPLICATION TO LEFT BELOW KNEE STUMP;  Surgeon: Lan Otto MD;  Location:  OR    CHOLECYSTECTOMY      GRAFT SKIN SPLIT THICKNESS FROM TRUNK TO HEAD Left 1/2/2024    Procedure: APPLICATION OF SPLIT-THICKNESS SKIN GRAFT TO LEFT BELOW KNEE STUMP, GRAFT FROM LEFT THIGH;  Surgeon: Lan Otto MD;  Location: [facility] OR    INCISION AND DRAINAGE FOOT, COMBINED Left 9/26/2023    Procedure: Surgical debridement of all nonviable skin and soft tissue and bone left foot;  Surgeon: Nolberto Thorne DPM;  Location: [facility] OR    IR LOWER EXTREMITY ANGIOGRAM LEFT  10/3/2023    IRRIGATION AND DEBRIDEMENT LOWER  EXTREMITY, COMBINED Right 7/14/2024    Procedure: IRRIGATION AND DEBRIDEMENT, LOWER EXTREMITY, RIGHT LOWER LEG;  Surgeon: Phuong Gutierrez MD;  Location: [facility] OR    ligation of fallopian tube      OPEN REDUCTION INTERNAL FIXATION ANKLE  10/13/11    left    PICC SINGLE LUMEN PLACEMENT  11/18/2024    pinning of left 2nd toe         Prior to Admission Medications   Prior to Admission Medications   Prescriptions Last Dose Informant Patient Reported? Taking?   HYDROmorphone (DILAUDID) 2 MG tablet 11/25/2024 at  7:04 AM  No Yes   Sig: Take 1-2 tablets (2-4 mg) by mouth every 3 hours as needed for breakthrough pain.   Patient taking differently: Take 2 mg by mouth every 3 hours as needed for breakthrough pain.   Insulin Aspart FlexPen 100 UNIT/ML SOPN 11/24/2024 at  9:00 PM  Yes Yes   Sig: Inject 5 Units subcutaneously 3 times daily (before meals).   Zinc oxide 10 % CREA 11/25/2024 Morning Nursing Home Yes Yes   Sig: Externally apply topically 3 times daily Apply to coccyx area once per shift after brief changes   acetaminophen (TYLENOL) 325 MG tablet 11/25/2024 at  8:00 AM  No Yes   Sig: Take 3 tablets (975 mg) by mouth 3 times daily.   apixaban ANTICOAGULANT (ELIQUIS) 5 MG tablet 11/25/2024 at  8:00 AM  No Yes   Sig: Take 1 tablet (5 mg) by mouth 2 times daily.   atorvastatin (LIPITOR) 40 MG tablet 11/24/2024 at  8:00 PM Nursing Home No Yes   Sig: Take 1 tablet (40 mg) by mouth every evening   calcium carbonate (TUMS) 500 MG chewable tablet Unknown  No Yes   Sig: Take 1 tablet (500 mg) by mouth 4 times daily as needed for heartburn.   ceFEPIme (MAXIPIME) 2 g vial 11/25/2024 at  8:00 AM  No Yes   Sig: Inject 2 g over 30 minutes into the vein every 8 hours for 21 days.   insulin degludec (TRESIBA) 200 UNIT/ML pen 11/25/2024 at  7:00 AM  Yes Yes   Sig: Inject 34 Units subcutaneously every morning Hold for BG < 100   melatonin 1 MG TABS tablet Unknown Nursing Home No Yes   Sig: Take 1 tablet (1 mg) by mouth nightly as needed for  sleep   methadone (DOLOPHINE) 5 MG tablet 11/25/2024 at 12:00 PM  No Yes   Sig: Take 1 tablet (5 mg) by mouth 4 times daily.   metroNIDAZOLE (FLAGYL) 500 MG tablet 11/25/2024 at  8:00 AM  No Yes   Sig: Take 1 tablet (500 mg) by mouth 3 times daily.   mineral oil-hydrophilic petrolatum (AQUAPHOR) external ointment 11/24/2024 Evening Nursing Home No Yes   Sig: Apply to left thigh wound BID   ondansetron (ZOFRAN ODT) 4 MG ODT tab Unknown  No Yes   Sig: Take 1 tablet (4 mg) by mouth every 6 hours as needed for nausea or vomiting.   polyethylene glycol (MIRALAX) 17 g packet Unknown  No Yes   Sig: Take 17 g by mouth 2 times daily as needed for constipation.   pregabalin (LYRICA) 25 MG capsule 11/25/2024 at  8:00 AM  No Yes   Sig: Take 1 capsule (25 mg) by mouth 2 times daily.   senna-docusate (SENOKOT-S/PERICOLACE) 8.6-50 MG tablet Unknown  No Yes   Sig: Take 1 tablet by mouth 2 times daily as needed for constipation.   sennosides (SENOKOT) 8.6 MG tablet 11/25/2024 at  8:00 AM  No Yes   Sig: Take 2 tablets by mouth 2 times daily.   torsemide (DEMADEX) 20 MG tablet 11/25/2024 at  8:00 AM  Yes Yes   Sig: Take 60 mg by mouth daily   vancomycin (VANCOCIN) 1250 mg/250 mL IVPB 11/25/2024 at  2:35 PM  No Yes   Sig: Inject 1,250 mg over 90 minutes into the vein every 24 hours for 21 days. Weekly CBC with differential, BMP, CRP vancomycin trough/AUC   wound support modular (EXPEDITE) LIQD bottle 11/25/2024 at 10:30 AM  No Yes   Sig: Take 60 mLs by mouth daily   Patient taking differently: Take 60 mLs by mouth 3 times daily. With meals      Facility-Administered Medications: None         Physical Exam   Vital Signs: Temp: 97.9  F (36.6  C) Temp src: Oral BP: 126/82 Pulse: 113   Resp: 18 SpO2: 97 % O2 Device: None (Room air)    Weight: 217 lbs 0 oz    Constitutional: alert, no acute distress, pleasant and cooperative  Cardiovascular: regular rate and rhythm, no murmurs/rubs/gallops  Respiratory: normal respiratory  rate/rhythm/effort, all lung fields CTAB, speaks in complete sentences, no wheezing or stridor  Neck: no adenopathy, thyroid symmetric and normal size, trachea midline, and no jugular venous distension  Eyes: conjunctivae normal and sclera anicteric  ENT: Throat normal without erythema or exudate   Abdomen: Abdomen soft and without distension or tenderness.  Exam limited by habitus.  Neuro: Alert and oriented, no focal deficits  Skin: Large ulcer involving much of the posterior right leg, oozing blood, and numerous other smaller ulcers in various stages of healing.  These appear unchanged since photos obtained in the emergency department.  MSK: bilateral BKA  Psychiatric: judgment and insight intact; tearful affect    Medical Decision Making   Please see A&P for additional details of medical decision making.      Data     I have personally reviewed the following data over the past 24 hrs:    11.2 (H)  \   10.4 (L)   / 239     138 96 (L) 32.3 (H) /  205 (H)   3.4 31 (H) 0.88 \     Procal: 0.14 CRP: <3.00 Lactic Acid: N/A         Imaging results reviewed over the past 24 hrs:   No results found for this or any previous visit (from the past 24 hours).

## 2024-11-26 NOTE — CONSULTS
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 She is now readmitted with concerns for adaptic dressing being stuck in wound, severe pain with dressing changes. On exam, she is afebrile, RLE guillotine wound does not appear infected, and her posterior calf wound has healthy granulation appearing tissue with no dressings clinging to wound at this time. Patient is tearful, tells me she just wants to be done with the pain of dressing changes, not sure she can wait at a TCU for several more weeks. Given her current posterior calf wound as it is we would not be able to perform a BKA this admission, not until  this ulcer is healing, which it has not done since July. We could consider performing an AKA and close this wound which eliminates the need for dressing changes of both the calf wounds and the amputation stump, however of course comes with significant morbidity, makes  working with a prosthesis after healing require significantly more expenditure. Patient wishes to think about this and discuss with her daughter.     - No indication for urgent vascular surgery intervention at this time, no plan for intervention tomorrow.  - OK for diet from vascular perspective  - Appreciate [facility] assistance with dressing changes.  - Vascular surgery will continue to follow    Discussed with Dr. Sanches, who is in agreement with the above    Brennon Nicholson MD  Surgery PGY4    History of Present Illness:    Linda Loredo is a 79 year old female that presents with  [facility] controlled T2DM, HFpEF, paroxysmal afib (on apixaban), HTN, HLD, chronic lymphedema and venous stasis ulcers who is POD11 s/p RLE saritha CRUZ for diabetic foot infection. . She is now readmitted with severe pain in her RLE and concerns regarding possible adaptic stuck in wound.     Reports she saw Dr. Sanches yesterday in clinic and after returning to TCU afterwards with plan for dressing change, when dressing was changed there was a piece of adaptic stuck in the wound, which when removed caused significant bleeding. She also had significant pain with this.   States dressing changes at TCU are the most difficult and more difficult than in hospital because the supplies at TCU Are not the same as here and the dressings stick more frequently to the wounds. This cause her a lot of pain.     She is tearful during my exam, with balbiren'ts daughter, Lu, on the phone as well. States she does not want to keep having this pian. She expresses concern that she cannot return to TCU given the significant pain associated with dressing changes there and the waiting while there.     No fever, chills, chest pain, shortness of breath, nausea, vomiting.     Past Medical History:  Past Medical History:   Diagnosis Date    Abscess of left foot 10/31/2022    Acute cystitis without hematuria 07/11/2024    Acute kidney injury (H) 07/11/2024    Adverse effect of anticoagulants, initial encounter 04/16/2024    GUSTAVO (acute kidney injury) (H) 05/26/2023    Allergic rhinitis 04/08/2008    Anemia 07/11/2024    Anticoagulation monitoring, INR range 2-3 07/06/2022    Anxiety 10/20/2011    Cardiac murmur, unspecified 05/29/2019    Cellulitis - bilateral lower extremities 05/27/2023    Cellulitis of buttock 05/26/2023    Chronic atrial fibrillation (H) 05/30/2022    New onset during 5/2022 admission      Chronic kidney disease, stage 3b (H) 06/12/2024    Chronic pain 04/26/2010    Chronic right-sided heart failure (H) 11/16/2023     Constipation 2024    Decubitus ulcers - 5 present on buttocks/perineal region, numerous scabbed over and unstagable on shin and bilateral feet with some bloody blisters noted on feet 2023    Depressive disorder, not elsewhere classified     Diabetic foot ulcer (H) 2023    Diabetic polyneuropathy associated with type 2 diabetes mellitus (H) 2006: on gabapentin.  She has been switched from gabapentin to lyrica.       Elevated liver enzymes 10/20/2011    Essential hypertension with goal blood pressure less than 140/90 2005    Fibromyalgia 10/20/2011    Fracture of fibula, distal, left, closed 10/20/2011    ORIF 10/13/11      Herpes zoster 2005: On gabapentin and lidoderm. She has been switched from gabapentin to lyrica.       Herpes zoster without mention of complication     Hx of osteomyelitis 2024    S.p left BKA       Hypercalcemia 2007    Hypoglycemia 2023    Hypotension 10/27/2011    Insomnia 07/15/2005    Lymphedema, not elsewhere classified 2024    Major depressive disorder, recurrent episode, moderate (H) 2008    Metabolic encephalopathy 2024    Microalbuminuria due to type 2 diabetes mellitus (H) 10/25/2016    Mild intermittent asthma     Mixed hyperlipidemia 2005    Mixed hyperlipidemia due to type 2 diabetes mellitus (H) 2008    Formatting of this note is different from the original.     22 ASCVD 10 year risk: 37.9%      Last Lipids:  Last Lipids:  Chol: 2021 130   63  HDL: 2021 46  Non-HDL: 2021 84  Chol/HDL Ratio: 2021 2.83  LDL DIRECT: . No results found in past 5 years   LDL CHOLESTEROL (mg/dL)   Date Value   2021 71      22   The 10-year ASCVD risk score (Teddy SMITH Jr.    Mixed stress and urge urinary incontinence 2005: On Detrol LA.      Non-healing wound of left heel 2023    Obesity with BMI  >35 and comorbidity 03/13/2006 February 26, 2007: Body mass index is 48.07 kg/(m^2).       Opioid dependence, uncomplicated (H) 04/26/2023    BERLIN (obstructive sleep apnea) 10/23/2007    Sleep study 2004 reportedly showing severe OBSTRUCTIVE SLEEP APNEA and recommend CPAP, Not available for review. Patient is untreated       Osteoarthritis 09/18/2006    Osteomyelitis (H) 10/24/2023    Osteopenia 05/20/2015    Other abnormalities of gait and mobility 11/15/2023    Other urinary incontinence     Pressure injury of deep tissue of sacral region 07/11/2024    Primary hyperparathyroidism (H) 01/23/2013    Work up January 2013 Dr. 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       Supratherapeutic INR 05/27/2023    Type 2 diabetes mellitus with complication, with long-term current use of insulin (H) 04/18/2005    diagnosed in 2001       Type II or unspecified type diabetes mellitus with renal manifestations, uncontrolled(250.42) (H)     Type II or unspecified type diabetes mellitus without mention of complication, not stated as uncontrolled     Umbilical hernia without obstruction and without gangrene 12/13/2018    Unspecified essential hypertension     Unspecified open wound, left foot, subsequent encounter 09/22/2023    UTI (urinary tract infection) - possible 05/26/2023    Venous stasis 04/16/2024    Vitamin D deficiency 09/08/2022    Warfarin-induced coagulopathy (H) 05/27/2023       Past  Surgical History  Guillotine BKA RLE 11/14/24   Guillotine BKA LLE 10/7/23   10/14  - LLE tibial guillotine amputation with subsequent closure of the wound with resection of tibia and fibula and closure of muscle around this   1/2/24 - STSG to LLE amputation stump      Medications:  No current outpatient medications on file.       Allergies:     Allergies   Allergen Reactions    Prednisone Nausea and Vomiting    Morphine Nausea and Vomiting    Morphine [Fumaric Acid] Nausea and Vomiting    Nitrofurantoin Unknown     Per nursing home       Review of Symptoms:  A 10 point review of symptoms has been conducted and is negative except for that mentioned in the above HPI.    Physical Exam:    Blood pressure 120/64, pulse 75, temperature 97.7  F (36.5  C), temperature source Oral, resp. rate 16, weight 92.9 kg (204 lb 12.9 oz), SpO2 94%.  Gen:    Lying in bed in NAD, A&OX3  HEENT: Normocephalic and atraumatic  CV:  RRR  Pulm:  Non-labored breathing on room air  Abd:  Soft, non-tender, non-distended  Ext:  Warm and well perfused, LLE BKA, healed. RLE with guillotine BKA with healthy fat tissue, posterior calf wound with healthy granulation tissue. Anterior shin with scattered areas purple/dusky blistering.   Vascular:  Strong biphasic popliteal artery doppler signal           Lateral leg        Posterior leg         Labs:  CBC RESULTS:   Recent Labs   Lab Test 11/26/24  0447   WBC 6.9   HGB 9.7*        Last Basic Metabolic Panel:  Lab Results   Component Value Date    POTASSIUM 3.4 11/25/2024    POTASSIUM 4.3 06/28/2022    POTASSIUM 4.5 02/22/2008     Lab Results   Component Value Date    CR 0.78 11/26/2024    CR 0.82 02/22/2008       Imaging:  No imaging reviewed.

## 2024-11-26 NOTE — CONSULTS
[facility] ACUTE INPATIENT PAIN SERVICE    [facility], [facility], [facility], [facility], [place]   PAIN Consult      Assessment/Plan:  Linda Loredo is a 79 year old female who was admitted on 11/25/2024.  I was asked by Dr. Morin, Curtis BURROUGHS MD  to see the patient for painful wounds. Admitted for complex wound care. History of RLE BKA, venous wounds, DM2, chronic pain, CKD..    shows lyrica, dilaudid, methadone. Describes pain as not present.      PLAN:  Chronic wound pain with opioid tolerance.  Recommend continuous pulse oximetry today due to sedation.  Multimodal Medication Therapy:   Adjuvants: continue lyrica 25mg BID - con optimize to TID if sedation allows for it  Opioids: Methadone 5mg QID- Qtc was 390 recently   Dilaudid PO 2-4mg PRN   IV dilaudid only for dressing changes   Non-medication interventions- Ice   Constipation Prophylaxis- discontinue senna and try colace   Follow up /Discharge Recommendations - We recommend prescribing the following at the time of discharge:  follow up with Capri Cabrera NP           Subjective:    Today I met with Margaret in the ER.  She reports that she has no pain.  She does report that some of the dressing was adhered/stuck to her stump.  This is what prompted her admission per her report.  She states that she has continued on methadone 4 times daily and Dilaudid 2 to 4 mg in the facility.  Of note when I enter the room I called her name several times and she is very slow to wake and her head is slumped down and she appears to be sleeping. Very small BMs lately.   Call placed to Lu (daughter). Discussed adding lidocaine.     History   Drug Use No         Tobacco Use      Smoking status: Never      Smokeless tobacco: Never        Objective:  Vital signs in last 24 hours:  B/P: 136/65, T: 97.8, P: 77, R: 18   Blood pressure 136/65, pulse 77, temperature 97.8  F (36.6  C), temperature source Oral, resp. rate 18, weight 98.4 kg (217 lb), SpO2  100%.        Review of Systems:   As per subjective, all others negative.    Physical Exam    General: sleepy   HEENT: Head normocephalic atraumatic, oral mucosa moist. Sclerae anicteric  CV: no cough   Resp: no chest pain   GI: distended   Skin: left leg wounds   Extremities: amputation           Imaging:  Personally Reviewed.    No results found for this visit on 11/25/24.     Lab Results:  Personally Reviewed.   Last Comprehensive Metabolic Panel:  Sodium   Date Value Ref Range Status   11/25/2024 138 135 - 145 mmol/L Final   02/22/2008 140 133 - 144 mmol/L Final     Potassium   Date Value Ref Range Status   11/25/2024 3.4 3.4 - 5.3 mmol/L Final   06/28/2022 4.3 3.5 - 5.0 mmol/L Final   02/22/2008 4.5 3.4 - 5.3 mmol/L Final     Chloride   Date Value Ref Range Status   11/25/2024 96 (L) 98 - 107 mmol/L Final   06/28/2022 97 (L) 98 - 107 mmol/L Final   02/22/2008 100 94 - 109 mmol/L Final     Carbon Dioxide   Date Value Ref Range Status   02/22/2008 28 20 - 32 mmol/L Final     Carbon Dioxide (CO2)   Date Value Ref Range Status   11/25/2024 31 (H) 22 - 29 mmol/L Final   06/28/2022 29 22 - 31 mmol/L Final     Anion Gap   Date Value Ref Range Status   11/25/2024 11 7 - 15 mmol/L Final   06/28/2022 13 5 - 18 mmol/L Final   02/22/2008 11 6 - 17 mmol/L Final     Glucose   Date Value Ref Range Status   06/28/2022 73 70 - 125 mg/dL Final   02/22/2008 298 (H) 60 - 99 mg/dL Final     GLUCOSE BY METER POCT   Date Value Ref Range Status   11/26/2024 143 (H) 70 - 99 mg/dL Final     Urea Nitrogen   Date Value Ref Range Status   11/25/2024 32.3 (H) 8.0 - 23.0 mg/dL Final   06/28/2022 42 (H) 8 - 28 mg/dL Final   02/22/2008 15 7 - 30 mg/dL Final     Creatinine   Date Value Ref Range Status   11/26/2024 0.78 0.51 - 0.95 mg/dL Final   02/22/2008 0.82 0.60 - 1.30 mg/dL Final     GFR Estimate   Date Value Ref Range Status   11/26/2024 77 >60 mL/min/1.73m2 Final     Comment:     eGFR calculated using 2021 CKD-EPI equation.   02/22/2008  "75 >60 mL/min/1.7m2 Final     Calcium   Date Value Ref Range Status   11/25/2024 10.1 8.8 - 10.4 mg/dL Final     Comment:     Reference intervals for this test were updated on 7/16/2024 to reflect our healthy population more accurately. There may be differences in the flagging of prior results with similar values performed with this method. Those prior results can be interpreted in the context of the updated reference intervals.   02/22/2008 10.4 8.5 - 10.4 mg/dL Final        UA: No results found for: \"UAMP\", \"UBARB\", \"BENZODIAZEUR\", \"UCANN\", \"UCOC\", \"OPIT\", \"UPCP\"           Please see A&P for additional details of medical decision making.  MANAGEMENT DISCUSSED with the following over the past 24 hours: daughter, RN   NOTE(S)/MEDICAL RECORDS REVIEWED over the past 24 hours: medicine, nursing  Tests personally interpreted in the past 24 hours:  - chest xray showing clear  Tests REVIEWED in the past 24 hours:  - CrtCl  SUPPLEMENTAL HISTORY, in addition to the patient's history, over the past 24 hours obtained from:   - daughter  Medical complexity over the past 24 hours:  -------------------------- HIGH RISK FOR MORBIDITY -------------------------------------------------------------  - Parenteral (IV) CONTROLLED SUBSTANCES ordered           Tasia Perez APRN, CNS, CNP  Acute Care Pain Management  Team  Hours of pain coverage Mon-Fri 8-1600  After hours contact the primary team  [facility] (RASHID, TERAs, SD, RH)   Page via [employer] web console -Click for [employer]            "

## 2024-11-27 ENCOUNTER — APPOINTMENT (OUTPATIENT)
Dept: ULTRASOUND IMAGING | Facility: CLINIC | Age: 79
DRG: 593 | End: 2024-11-27
Payer: MEDICARE

## 2024-11-27 LAB
CREAT SERPL-MCNC: 0.86 MG/DL (ref 0.51–0.95)
EGFRCR SERPLBLD CKD-EPI 2021: 68 ML/MIN/1.73M2
GLUCOSE BLDC GLUCOMTR-MCNC: 134 MG/DL (ref 70–99)
GLUCOSE BLDC GLUCOMTR-MCNC: 173 MG/DL (ref 70–99)
GLUCOSE BLDC GLUCOMTR-MCNC: 209 MG/DL (ref 70–99)
GLUCOSE BLDC GLUCOMTR-MCNC: 246 MG/DL (ref 70–99)
GLUCOSE BLDC GLUCOMTR-MCNC: 253 MG/DL (ref 70–99)
GLUCOSE BLDC GLUCOMTR-MCNC: 259 MG/DL (ref 70–99)

## 2024-11-27 PROCEDURE — 250N000013 HC RX MED GY IP 250 OP 250 PS 637

## 2024-11-27 PROCEDURE — 250N000013 HC RX MED GY IP 250 OP 250 PS 637: Performed by: PAIN MEDICINE

## 2024-11-27 PROCEDURE — 250N000011 HC RX IP 250 OP 636: Performed by: FAMILY MEDICINE

## 2024-11-27 PROCEDURE — 99233 SBSQ HOSP IP/OBS HIGH 50: CPT | Mod: GC

## 2024-11-27 PROCEDURE — 250N000011 HC RX IP 250 OP 636

## 2024-11-27 PROCEDURE — 250N000011 HC RX IP 250 OP 636: Performed by: PAIN MEDICINE

## 2024-11-27 PROCEDURE — 97606 NEG PRS WND THER DME>50 SQCM: CPT

## 2024-11-27 PROCEDURE — 258N000003 HC RX IP 258 OP 636

## 2024-11-27 PROCEDURE — 120N000004 HC R&B MS OVERFLOW

## 2024-11-27 PROCEDURE — 82565 ASSAY OF CREATININE: CPT | Performed by: FAMILY MEDICINE

## 2024-11-27 PROCEDURE — 93971 EXTREMITY STUDY: CPT | Mod: RT

## 2024-11-27 RX ADMIN — ACETAMINOPHEN 975 MG: 325 TABLET ORAL at 14:13

## 2024-11-27 RX ADMIN — INSULIN ASPART 3 UNITS: 100 INJECTION, SOLUTION INTRAVENOUS; SUBCUTANEOUS at 14:12

## 2024-11-27 RX ADMIN — ACETAMINOPHEN 975 MG: 325 TABLET ORAL at 20:55

## 2024-11-27 RX ADMIN — SODIUM CHLORIDE: 9 INJECTION, SOLUTION INTRAVENOUS at 02:51

## 2024-11-27 RX ADMIN — INSULIN DEGLUDEC 28 UNITS: 100 INJECTION, SOLUTION SUBCUTANEOUS at 10:30

## 2024-11-27 RX ADMIN — LIDOCAINE: 50 OINTMENT TOPICAL at 10:25

## 2024-11-27 RX ADMIN — LIDOCAINE: 50 OINTMENT TOPICAL at 14:26

## 2024-11-27 RX ADMIN — METHADONE HYDROCHLORIDE 5 MG: 5 TABLET ORAL at 22:49

## 2024-11-27 RX ADMIN — METRONIDAZOLE 500 MG: 500 TABLET, FILM COATED ORAL at 20:55

## 2024-11-27 RX ADMIN — METHADONE HYDROCHLORIDE 5 MG: 5 TABLET ORAL at 14:13

## 2024-11-27 RX ADMIN — METHADONE HYDROCHLORIDE 5 MG: 5 TABLET ORAL at 18:12

## 2024-11-27 RX ADMIN — VANCOMYCIN HYDROCHLORIDE 1000 MG: 1 INJECTION, SOLUTION INTRAVENOUS at 10:25

## 2024-11-27 RX ADMIN — HYDROMORPHONE HYDROCHLORIDE 0.2 MG: 0.2 INJECTION, SOLUTION INTRAMUSCULAR; INTRAVENOUS; SUBCUTANEOUS at 13:23

## 2024-11-27 RX ADMIN — INSULIN ASPART 3 UNITS: 100 INJECTION, SOLUTION INTRAVENOUS; SUBCUTANEOUS at 20:59

## 2024-11-27 RX ADMIN — INSULIN ASPART 1 UNITS: 100 INJECTION, SOLUTION INTRAVENOUS; SUBCUTANEOUS at 04:44

## 2024-11-27 RX ADMIN — LIDOCAINE: 50 OINTMENT TOPICAL at 20:57

## 2024-11-27 RX ADMIN — INSULIN ASPART 3 UNITS: 100 INJECTION, SOLUTION INTRAVENOUS; SUBCUTANEOUS at 17:37

## 2024-11-27 RX ADMIN — ATORVASTATIN CALCIUM 40 MG: 40 TABLET, FILM COATED ORAL at 20:55

## 2024-11-27 RX ADMIN — CEFEPIME 2 G: 2 INJECTION, POWDER, FOR SOLUTION INTRAVENOUS at 06:36

## 2024-11-27 RX ADMIN — TORSEMIDE 60 MG: 20 TABLET ORAL at 10:34

## 2024-11-27 RX ADMIN — METRONIDAZOLE 500 MG: 500 TABLET, FILM COATED ORAL at 10:55

## 2024-11-27 RX ADMIN — ACETAMINOPHEN 975 MG: 325 TABLET ORAL at 10:33

## 2024-11-27 RX ADMIN — HYDROMORPHONE HYDROCHLORIDE 2 MG: 2 TABLET ORAL at 04:50

## 2024-11-27 RX ADMIN — DOCUSATE SODIUM 100 MG: 100 CAPSULE, LIQUID FILLED ORAL at 10:32

## 2024-11-27 RX ADMIN — APIXABAN 5 MG: 5 TABLET, FILM COATED ORAL at 20:55

## 2024-11-27 RX ADMIN — PREGABALIN 25 MG: 25 CAPSULE ORAL at 20:55

## 2024-11-27 RX ADMIN — PREGABALIN 25 MG: 25 CAPSULE ORAL at 10:33

## 2024-11-27 RX ADMIN — METRONIDAZOLE 500 MG: 500 TABLET, FILM COATED ORAL at 14:12

## 2024-11-27 RX ADMIN — HYDROMORPHONE HYDROCHLORIDE 4 MG: 2 TABLET ORAL at 12:11

## 2024-11-27 RX ADMIN — SODIUM CHLORIDE: 9 INJECTION, SOLUTION INTRAVENOUS at 04:43

## 2024-11-27 RX ADMIN — CEFEPIME 2 G: 2 INJECTION, POWDER, FOR SOLUTION INTRAVENOUS at 22:49

## 2024-11-27 RX ADMIN — INSULIN ASPART 2 UNITS: 100 INJECTION, SOLUTION INTRAVENOUS; SUBCUTANEOUS at 01:06

## 2024-11-27 RX ADMIN — METHADONE HYDROCHLORIDE 5 MG: 5 TABLET ORAL at 10:35

## 2024-11-27 RX ADMIN — CEFEPIME 2 G: 2 INJECTION, POWDER, FOR SOLUTION INTRAVENOUS at 14:12

## 2024-11-27 ASSESSMENT — ACTIVITIES OF DAILY LIVING (ADL)
ADLS_ACUITY_SCORE: 58
ADLS_ACUITY_SCORE: 55
ADLS_ACUITY_SCORE: 58
ADLS_ACUITY_SCORE: 55
ADLS_ACUITY_SCORE: 56
ADLS_ACUITY_SCORE: 55
ADLS_ACUITY_SCORE: 58
ADLS_ACUITY_SCORE: 55
ADLS_ACUITY_SCORE: 58
ADLS_ACUITY_SCORE: 58
ADLS_ACUITY_SCORE: 55
ADLS_ACUITY_SCORE: 56
ADLS_ACUITY_SCORE: 55
ADLS_ACUITY_SCORE: 58
ADLS_ACUITY_SCORE: 56
ADLS_ACUITY_SCORE: 56
ADLS_ACUITY_SCORE: 55
ADLS_ACUITY_SCORE: 58

## 2024-11-27 NOTE — CONSULTS
[facility] Nurse Inpatient Assessment     Consulted for: R BKA/Calf wound    Summary: patient is well known to woc team. Daughter at bedside. Had a long talk about frustration as both daughter and patient were in tears. The pain and the waiting is weighing on both of them. Daughter and patient want another opinion on the AKA possibility vs waiting for a BKA. Alerted primary care team. Social work at bedside to discuss TCU options as well, will reach out to [facility] to see which facilities may use vac dressings and have confident wound care nursing. Daughter was given the number for the patient advocate.     Patient History (according to provider note(s):      Assessment & Plan  Linda Loredo is a 79 year old female who has a history of poorly controlled DM2, HFpEF, paroxysmal atrial fibrillation, HTN, HLD, chronic lymphedema and venous stasis ulcers, and is admitted for pain management and complex wound care.     Venous stasis ulcer  Longstanding problem for which patient follows with [facility] wound care, however patient reports pain is worse now.  The ulcer, which is on her right calf, has been present to some degree for at least 1 year and 3 months (see photo of right calf dated 9/25/2023).  An excisional debridement of the area was performed in July 2024.  Patient is admitted for pain control due to inability to tolerate dressing changes and concern for retained gauze in the wound.  Low suspicion for acute infection given patient is on broad-spectrum antibiotic regimen and is afebrile with barely elevated WBCs, normal range procalcitonin and CRP, and absence of purulent or foul-smelling exudates.  Vascular surgery and [facility] consult, assistance and recommendations appreciated  Acetaminophen scheduled 975 mg 3 times daily  PTA methadone 5 mg 4 times daily  PTA pregabalin 25 mg twice daily  PTA Dilaudid 2-4 mg every 3 hours as needed for severe pain  0.2 mg IV  Dilaudid every 2 hours as needed for breakthrough pain, reserved for dressing changes     RLEMILY CRUZ 11/14/24  Seen by vascular surgery in clinic morning day of admission and was assessed to be doing well.  Cultures were polymicrobial and patient has documented history of ESBL and MRSA.  Continue PTA vancomycin, cefepime, metronidazole until stump closure per ID  Routine cares per Vascular, WOC instructions     Type 2 diabetes mellitus  A1c 8.4%.   Very long history of above goal A1c. Sees [facility] endocrinology.  Previously was taking Ozempic, but now only takes insulin; endocrinology notes reviewed but reasoning behind this was not clear.  Possibly this was held during a previous hospital admission and inadvertently never resumed.  Patient reports being overdue for endocrine follow-up.  Sugars were reasonably well-controlled during previous admissions so we will start with same insulin regimen.  Tresiba 28 units every morning  Medium sliding scale insulin      Chronic stable problems  Paroxysmal atrial fibrillation: Continue PTA apixaban  Anemia of chronic disease: Noted  Hyperlipidemia: Continue PTA atorvastatin  Lymphedema, HFpEF: Continue PTA torsemide  CKD 3: Noted, trend creatinine    Assessment:      Negative pressure wound therapy applied to: RLE       11/15 (from previous assessment/admission)          11/27     Last photo: 11/27   Wound due to: Surgical Wound   Wound history/plan of care:    Surgical date: 11/14   Service following: Vascular  Date Negative Pressure Wound Therapy initiated: 11/15   Interventions in place: offloading  Is patient s nutritional status compromised? no   If yes, what interventions are in place? Protein supplements  Reason for initiating vac therapy? High risk of infections  Which?of?the?following?co-morbidities?apply? Diabetes  If diabetic is patient on a diabetic management program? Yes   Is osteomyelitis present in wound? no   If yes what treatments are in place? N/A     Wound  base: Calf 100 % Dermis with some purpura at edges and maceration, BKA 10% bone; 90 % Adipose tissue/muscle     Palpation of the wound bed: normal       Drainage: small      Volume in cannister: 0     Last cannister change date: 11/27     Description of drainage: bloody      Measurements (length x width x depth, in cm) Calf 22  x 15  x  0.2 cm  ; BKA 10 x 9 x 1 cm - patient with additional purpura to shin with several intact blood blisters, not pressure related but likely more vascularly related     Tunneling N/A      Undermining N/A   Periwound skin: Intact       Color: pink       Temperature: warm   Odor: mild   Pain: severe, Irritable or crying out at intervals, crying inconsolably, tension to hands, feet and body, facial expression of distress, and during dressing change, shooting and constant   Pain intervention prior to dressing change: slow and gentle cares ; break in middle of treatment; IV and PO pain medications, see MAR  Treatment goal: Infection control/prevention, Protection, and Promote epidermal migration  STATUS: initial assessment   Supplies ordered: gathered     Number of foam pieces removed from a wound (excluding foam for bridge) :  0 GranuFoam Black and 0 urgotul    Verified this matched the number of foam pieces applied last dressing change: Yes   Number of foam pieces packed into wound (excluding foam for bridge) :  1 GranuFoam Black medium and 1 urgotul ag  to BKA; 1 large peel and place vac dressing to calf       Treatment Plan:     Negative pressure wound therapy plan:  Wound location: R BKA x2 wounds   Change Days:  Every 5 days (W-M-F-W)  by WOC RN    Supplies (including all accessories) used: large Black foam  (or peel and stick) + 1 medium foam + oil emulsion gauze (or mepitel or urgotul) - do not use xeroform under vac  Cleanse with Vashe prior to replacing NPWT  Suction setting: -125   Methods used: Window paned all periwound skin with vac drape prior to applying sponge    Staff RN to  "assess integrity of dressing and ensure suction is set at appropriate level every shift.   Date canister. Chart canister output every shift. Change cannister weekly and PRN if full/occluded     Remove foam dressing and replace with BID normal saline moist gauze dressing if:   -a dressing failure which cannot be repaired within 2 hours   -patient is discharging to home without a home pump   -patient is discharging to a facility outside the local area   -if a dressing is a \"Silver Foam\", remove before Radiation Therapy or MRI     The hospital VAC pump is not to be discharged with the patient. Please disconnect the patient from the machine prior to discharge.  If a home VAC pump has been delivered, connect the home cannister to dressing tubing and the cannister to the home pump, turn on home pump  If the patient is transferring to a nearby facility with a VAC, the tubing can be disconnected, clamp tubing and cover the end with a glove, then can be reconnected if within 2 hours  If transfer will be longer than 2 hours, dressing must be removed and placed with a wet to moist gauze dressing for transfer         RLE (IF VAC FAILS)  1. Soak leg with sterile water to saturate dressings and help with removal. Remove old dressings and place a new dry chux.   2. moisten 4x4 gauze with vashe and lay them out on new chux pad to the size of the wound so patient can set the calf wound down on the moist dressings. Cover bka with moist vashe gauze as well  3. cover by gently folding chux around leg  4. Change BID til vac placement. WOC will place vac wed in the afternoon.  5. Call MD for wound decline      Orders: Written    RECOMMEND PRIMARY TEAM ORDER: Vascular consult  Education provided: importance of repositioning, plan of care, and Off-loading pressure  Discussed plan of care with: Patient, Family, Nurse, and Physician  WOC nurse follow-up plan: twice weekly  Notify WOC if wound(s) deteriorate.  Nursing to notify the " Provider(s) and re-consult the [facility] Nurse if new skin concern.    DATA:     Current support surface: Standard  ED cart  Containment of urine/stool: Incontinence Protocol  BMI: Body mass index is 33.91 kg/m .   Active diet order: Orders Placed This Encounter      Regular Diet Adult     Output: I/O last 3 completed shifts:  In: -   Out: 1150 [Urine:1150]     Labs:   Recent Labs   Lab 11/26/24  0447   HGB 9.7*   WBC 6.9     Pressure injury risk assessment:   Sensory Perception: 3-->slightly limited  Moisture: 3-->occasionally moist  Activity: 1-->bedfast  Mobility: 2-->very limited  Nutrition: 3-->adequate  Friction and Shear: 1-->problem  Suresh Score: 13    ZAMZAM Lindsay RN CWOCN  Pager no longer in use, please contact through "[employer] group: [facility] [employer]

## 2024-11-27 NOTE — PROGRESS NOTES
Vascular Surgery Progress Note    After discussion with vascular surgery staff, we would not recommend an above knee amputation at this time for this patient for several reasons. There is significant associated morbidity with an above knee amputation, and unlikely patient would walk again, she did express understanding of this however there are other major concerns - when amputations are done for pain, they often can actually exacerbate the pain and the pain then becomes worse. The other concern is that with her edema and swelling of her RLE as it is, including of her thigh, that we may not be able to close an above knee amputation either, which creates the same problem as we have now, but higher up with fewer options for moving forward.     I called patient and her daughter, Lu, to discuss this recommendation. They were understandably frustrated with lack of options moving forward to help relieve the pain that Pat is experiencing. It certainly is a significant emotional and mental strain on patient and she experiences significant anxiety surrounding these dressing changes.     We will plan a referral for regional anesthesia team to consider placing a nerve block to help with pain relief.     Also will defer to [facility] team on recommendations for dressing changes moving forward and optimizing options for pain control with these changes.     Lu and Pat express that the TCU Pat is living in is not able to pay for the dressings that work for her to have less pain, and they are curious if there are other options for TCUs which may be able to access those dressings. I recommended they speak with a  regarding these considerations.     Of note, for Ms. Awa Loredo's LLE she had a difficult time healing and she actually did not have a BKA formalization but rather a tibia and fibula resection with partial closure of tissue, with granulation tissue allowed to develop over wound and then skin grafting  several months later, this was due to the significant edema of the wound and concerns regarding her skin integrity.     Overall - Ms. Awa Loredo is in a difficult situation and we do not have a clear surgical answer to assist with her pain. At this time, we would recommend continued dressing changes and wound care with ongoing compression and elevation of RLE to alleviate edema and will plan to see her in clinic on 12/23 as scheduled  to evaluate wound and progress and discuss further options for surgery pending progress.     They did wish me to re-emphasize to staff the dire situation of Ms. Awa Loredo's mental state and distress surrounding her leg. She expressed to me that she does not feel she is living much of a life,and does not wish to go on this way. I did relay these concerns, and overall the consensus is that at this point we cannot perform a BKA formalization, and cannot perform an AKA with closure due to significant wound on posterior flap for BKA and significant edema of the upper thigh which would preclude successful closure of AKA. We are highly concerned we would just worsen her problem, and likely create further dressing changes with any further intervention at this point in her healing.     Given the above, vascular surgery will sign off at this time, she has follow up with us 12/23. Please do not hesitate to reach out with questions or concerns.     Discussed with Dr. Sanches, vascular surgery staff, who is in agreement with the above.       Brennon Nicholson MD   Vascular Surgery PGY4  To reach [facility] vascular surgery team on call, please go to [facility], and then find   the below in the drop down menu. Please page fellow first.

## 2024-11-27 NOTE — PLAN OF CARE
Problem: Adult Inpatient Plan of Care  Goal: Absence of Hospital-Acquired Illness or Injury  Intervention: Identify and Manage Fall Risk     Problem: Adult Inpatient Plan of Care  Goal: Absence of Hospital-Acquired Illness or Injury  Intervention: Prevent Skin Injury     Problem: Adult Inpatient Plan of Care  Goal: Absence of Hospital-Acquired Illness or Injury  Intervention: Prevent Infection    Problem: Adult Inpatient Plan of Care  Goal: Optimal Comfort and Wellbeing  Intervention: Monitor Pain and Promote Comfort    Problem: Pain Acute  Goal: Optimal Pain Control and Function  Intervention: Prevent or Manage Pain     Problem: Infection  Goal: Absence of Infection Signs and Symptoms  Intervention: Prevent or Manage Infection    Pt is A&O. Prn PO Dilaudid for pain. Purewick in place. IV fluids and antibiotics infused into PICC per order. Dressing change to PICC completed. R BKA wound. Pt refusing to be repositioned by staff overnight. States she shifts herself in bed.

## 2024-11-27 NOTE — PROGRESS NOTES
Care Management Follow Up    Length of Stay (days): 2    Expected Discharge Date: 12/02/2024     Concerns to be Addressed:       Patient plan of care discussed at interdisciplinary rounds: Yes    Anticipated Discharge Disposition:     Return to [facility]           Anticipated Discharge Services:    Anticipated Discharge DME: None    Patient/family educated on Medicare website which has current facility and service quality ratings: no  Education Provided on the Discharge Plan:    Patient/Family in Agreement with the Plan: yes    Referrals Placed by CM/SW: Post Acute Facilities  Private pay costs discussed: Not applicable    Discussed  Partnership in Safe Discharge Planning  document with patient/family: No     Handoff Completed: No, handoff not indicated or clinically appropriate    Additional Information:  Met with pt and daughter. Pt and daughter would like a second opinion regarding her RLE. They were told that pt has 3 options regarding her RLE, one of the options being AKA. Pt and daughter stated that the pt has thought hard about what decision she wants to make. Pt states that she would like to do AKA and now she is being told that that is no longer an option.     Daughter has called Patient Relations and would like a second opinion regarding care for her RLE.     Next Steps: follow for medical readiness.     Jayme Zazueta RN

## 2024-11-27 NOTE — PROGRESS NOTES
[facility]    Progress Note - Hospitalist Service       Date of Admission:  11/25/2024    Assessment & Plan   Linda Loredo is a 79 year old female who has a history of poorly controlled DM2, HFpEF, paroxysmal atrial fibrillation, HTN, HLD, chronic lymphedema and venous stasis ulcers, and is admitted for pain management and complex wound care.     Venous stasis ulcer  Follows with [facility] wound care, pain is much worse now.  Ulcer of RLE has been present for 15 months (see photo of right calf dated 9/25/2023).  An excisional debridement of the area was performed in July 2024.  Low suspicion for acute infection given patient is on broad-spectrum antibiotic regimen and is afebrile with barely elevated WBCs, normal range procalcitonin and CRP, and absence of purulent or foul-smelling exudates. Patient remains in exquisite pain particularly related to dressing changes.  Patient has expressed desire and made decision to get any surgery that allows for less pain.  Vascular surgery and WOC consult, assistance and recs appreciated  Discussion with vascular surgery resident yielded that they were unlikely to do any surgical intervention at this time. Patient will be evaluated later today for a plan.  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  Cultures were polymicrobial and patient has documented history of ESBL and MRSA.  Continue PTA vancomycin, cefepime, metronidazole until stump closure per ID  Routine cares per Vascular, WOC  "instructions     Type 2 diabetes mellitus  A1c 8.4%.   Very long history of above goal A1c. Sees [facility] endocrinology.  Previously was taking Ozempic, but now only takes insulin. Will continue same insulin regimen has previous admissions.  Tresiba 28 units every morning  Medium sliding scale insulin      Chronic stable problems  Paroxysmal atrial fibrillation: Continue PTA apixaban  Anemia of chronic disease: Noted  Hyperlipidemia: Continue PTA atorvastatin  Lymphedema, HFpEF: Continue PTA torsemide  CKD 3: Noted, trend creatinine           Diet: Regular Diet Adult    DVT Prophylaxis: DOAC  Braga Catheter: Not present  Fluids: PO  Lines: PRESENT      PICC 11/18/24 Single Lumen Right Basilic Antibiotic-Site Assessment: WDL      Cardiac Monitoring: None  Code Status: Full Code      Clinically Significant Risk Factors          # Hypochloremia: Lowest Cl = 96 mmol/L in last 2 days, will monitor as appropriate          # Hypertension: Noted on problem list           # DMII: A1C = 8.4 % (Ref range: <5.7 %) within past 6 months   # Obesity: Estimated body mass index is 33.91 kg/m  as calculated from the following:    Height as of 11/13/24: 1.676 m (5' 6\").    Weight as of this encounter: 95.3 kg (210 lb 1.6 oz)., PRESENT ON ADMISSION       # Financial/Environmental Concerns: none         Social Drivers of Health    Received from [facility] [employer] [facility], [facility] [employer] [facility]    Social Connections         Disposition Plan     Medically Ready for Discharge: Anticipated in 2-4 Days         The patient's care was discussed with the Attending Physician, Dr. Mariana Tran .    Burt Gibson DO  Hospitalist Service  [facility]  Securely message with [employer] (more info)  Text page via [employer] Paging/Directory   ______________________________________________________________________    Interval History   No acute overnight events. Slept on and off last night. Very " sensitive to any changes in her room. Made note of some mildly elevated blood pressures and wanted to know if this was a problem. Answered all questions at bedside. Daughter, Lu, able to help her to make a decision about how to proceed in regard to her RLE venous stasis ulceration s/p BKA.    Physical Exam   Vital Signs: Temp: 97.3  F (36.3  C) Temp src: Oral BP: (!) 142/71 Pulse: 69   Resp: 16 SpO2: 97 % O2 Device: None (Room air)    Weight: 210 lbs 1.57 oz    Constitutional: awake, alert, tearful, cooperative, intermittent acute distress due to pain, and appears stated age  ENT: Normocephalic, without obvious abnormality, atraumatic, oral pharynx with moist mucous membranes, tonsils without erythema or exudates, gums normal and poor dentition  Respiratory: No increased work of breathing, good air exchange, clear to auscultation bilaterally, no crackles or wheezing  Cardiovascular: Normal apical impulse, regular rate and rhythm, normal S1 and S2, no S3 or S4, and no murmur noted  GI: No scars, normal bowel sounds, soft, non-distended, non-tender, no masses palpated, no hepatosplenomegally  Musculoskeletal: Left BKA. Right BKA with posterolateral venous stasis ulcer, anterior superficial skin necrosis, and open distal wound. Wet bandages applied.    Data     I have personally reviewed the following data over the past 24 hrs:    N/A  \   N/A   / N/A     N/A N/A N/A /  134 (H)   N/A N/A 0.86 \

## 2024-11-27 NOTE — TELEPHONE ENCOUNTER
Dr braga office called and stated she has not seen the patient in over year so she would not know about the medication and not sure who seen her for it last.

## 2024-11-27 NOTE — DISCHARGE INSTRUCTIONS
"[facility] DISCHARGE INSTRUCTIONS:  Negative pressure wound therapy plan:  Wound location: R BKA x2 wounds   Change Days: Every 5 days (W-M-F-W) by [facility] RN    Supplies (including all accessories) used: large Black foam (or peel and stick) + 1 medium foam + Y site connector + oil emulsion gauze (or mepitel or urgotul) - do not use xeroform under vac  Cleanse with Vashe prior to replacing NPWT  Suction setting: -125   Methods used: Window paned all periwound skin with vac drape prior to applying sponge    Staff RN to assess integrity of dressing and ensure suction is set at appropriate level every shift.   Date canister. Chart canister output every shift. Change cannister weekly and PRN if full/occluded     Remove foam dressing and replace with BID normal saline moist gauze dressing if:   -a dressing failure which cannot be repaired within 2 hours   -patient is discharging to home without a home pump   -patient is discharging to a facility outside the local area   -if a dressing is a \"Silver Foam\", remove before Radiation Therapy or MRI     The hospital VAC pump is not to be discharged with the patient. Please disconnect the patient from the machine prior to discharge.  If a home VAC pump has been delivered, connect the home cannister to dressing tubing and the cannister to the home pump, turn on home pump  If the patient is transferring to a nearby facility with a VAC, the tubing can be disconnected, clamp tubing and cover the end with a glove, then can be reconnected if within 2 hours  If transfer will be longer than 2 hours, dressing must be removed and placed with a wet to moist gauze dressing for transfer         RLE (IF VAC FAILS)  1. Soak leg with sterile water to saturate dressings and help with removal. Remove old dressings and place a new dry chux.   2. moisten 4x4 gauze with vashe and lay them out on new chux pad to the size of the wound so patient can set the calf wound down on the moist dressings. Cover bka with " moist vashe gauze as well  3. cover by gently folding chux around leg  4. Change BID til vac placement. WOC will place vac wed in the afternoon.  5. Call MD for wound decline

## 2024-11-27 NOTE — PROGRESS NOTES
Respiratory Note    Patient seen for CPAP order. Pt states she does not use CPAP at home and that she does not want hospital CPAP. RT to follow if needed.     /62 (BP Location: Left arm)   Pulse 82   Temp 97.7  F (36.5  C) (Oral)   Resp 16   Wt 92.9 kg (204 lb 12.9 oz)   SpO2 100%   BMI 33.06 kg/m      Song Mukherjee, RT on 11/26/2024 at 10:15 PM

## 2024-11-27 NOTE — PROGRESS NOTES
[facility] ACUTE INPATIENT PAIN SERVICE    [facility], [facility], [facility], [facility], [place]   PAIN Progress Note      Assessment/Plan:  Linda Loredo is a 79 year old female who was admitted on 11/25/2024. Acute Pain Management Team was asked by Dr. Morin, Curtis BURROUGHS MD  to see the patient for painful wounds. Admitted for complex wound care. History of RLE BKA, venous wounds, DM2, chronic pain, CKD..    shows lyrica, dilaudid, methadone.       Subjective:  Patient reporting constant pain RLE, feels the Dilaudid is very helpful. Patient reports pain is 10/10 at times, has constant pain, deep aching,  and just wants the pain to be over, patient is tearful about decision she has made, but feels it is the only solution. She is appreciative of all the staff here.  Patient reports pain medication working well for right LE pain.  Patient continues on methadone 5 mg qid, and has utilized 8 mg oral Dilaudid in the last 24 hours, one dose of IV Dilaudid 0.2 mg with dressing change yesterday am.   Patient denies constipation, eating well. Patient is not sedated or confused.     No changes to current regimen.     PLAN:  Chronic wound pain with opioid tolerance.  Recommend continuous pulse oximetry today due to sedation.  Multimodal Medication Therapy:   Adjuvants: continue Lyrica 25mg BID - con optimize to TID if sedation allows for it  Opioids: Methadone 5mg QID- Qtc was 390 recently   Dilaudid PO 2-4 mg q 3 h PRN   IV dilaudid only for dressing changes -Dilaudid 0.2 mg IV q 8 h prn  Non-medication interventions- Ice   Constipation Prophylaxis- senna and docusate scheduled, Miralax daily   Follow up /Discharge Recommendations - We recommend prescribing the following at the time of discharge:  follow up with Capri Cabrera, JAN Vazquez, [facility]  Acute Care Pain Management  Team  Hours of pain coverage Mon-Fri 8-1600  After hours contact the primary team  [facility] (RASHID, Lacey,  SD, RH)   Page via [phone]

## 2024-11-27 NOTE — PLAN OF CARE
Patient alert and oriented. Anxious and emotional at times. Denies chest pain or shortness of breath. Complain of RLE pain, available pain medication given.   VSS. LS clear. Cares provided as allowed. Had US RLE today. IVF discontinued. WOC RN placed negative pressure wound vac this evening. Family at bedside visiting. Will monitor and continue plan of care.     Problem: Pain Acute  Goal: Optimal Pain Control and Function  Outcome: Progressing     Problem: Infection  Goal: Absence of Infection Signs and Symptoms  Outcome: Progressing     Problem: Skin Injury Risk Increased  Goal: Skin Health and Integrity  Outcome: Progressing     Problem: Adult Inpatient Plan of Care  Goal: Absence of Hospital-Acquired Illness or Injury  Intervention: Identify and Manage Fall Risk  Recent Flowsheet Documentation  Taken 11/27/2024 0800 by Sudha Starr, RN  Safety Promotion/Fall Prevention:   safety round/check completed   room near nurse's station   nonskid shoes/slippers when out of bed   mobility aid in reach   lighting adjusted   increase visualization of patient   increased rounding and observation   clutter free environment maintained

## 2024-11-27 NOTE — PROGRESS NOTES
SPIRITUAL HEALTH SERVICES Progress Note    Saw pt Linda Loredo and offered Religious sacrament of anointing for the healing of the sick.     Fr. Rupesh Estrada

## 2024-11-27 NOTE — PLAN OF CARE
Problem: Adult Inpatient Plan of Care  Goal: Plan of Care Review  Description: The Plan of Care Review/Shift note should be completed every shift.  The Outcome Evaluation is a brief statement about your assessment that the patient is improving, declining, or no change.  This information will be displayed automatically on your shift  note.  Outcome: Progressing     Problem: Adult Inpatient Plan of Care  Goal: Optimal Comfort and Wellbeing  Intervention: Monitor Pain and Promote Comfort  Recent Flowsheet Documentation  Taken 11/27/2024 1600 by Tanya Downey, RN  Pain Management Interventions: medication (see MAR)     Problem: Adult Inpatient Plan of Care  Goal: Optimal Comfort and Wellbeing  Intervention: Provide Person-Centered Care  Recent Flowsheet Documentation  Taken 11/27/2024 1600 by Tanya Downey, RN  Trust Relationship/Rapport:   care explained   questions encouraged   questions answered   Goal Outcome Evaluation:    Kept comfortable on bed. Dinner served with good appetite. With PICC line right upper arm, on VAC machine to her right lower leg, for continuous nursing care.

## 2024-11-28 VITALS
OXYGEN SATURATION: 97 % | TEMPERATURE: 97.9 F | BODY MASS INDEX: 33.91 KG/M2 | DIASTOLIC BLOOD PRESSURE: 64 MMHG | HEART RATE: 78 BPM | SYSTOLIC BLOOD PRESSURE: 137 MMHG | RESPIRATION RATE: 18 BRPM | WEIGHT: 210.1 LBS

## 2024-11-28 LAB
CREAT SERPL-MCNC: 0.87 MG/DL (ref 0.51–0.95)
EGFRCR SERPLBLD CKD-EPI 2021: 67 ML/MIN/1.73M2
GLUCOSE BLDC GLUCOMTR-MCNC: 185 MG/DL (ref 70–99)
GLUCOSE BLDC GLUCOMTR-MCNC: 194 MG/DL (ref 70–99)
GLUCOSE BLDC GLUCOMTR-MCNC: 206 MG/DL (ref 70–99)
GLUCOSE BLDC GLUCOMTR-MCNC: 224 MG/DL (ref 70–99)
GLUCOSE BLDC GLUCOMTR-MCNC: 239 MG/DL (ref 70–99)

## 2024-11-28 PROCEDURE — 250N000013 HC RX MED GY IP 250 OP 250 PS 637

## 2024-11-28 PROCEDURE — 120N000004 HC R&B MS OVERFLOW

## 2024-11-28 PROCEDURE — 99233 SBSQ HOSP IP/OBS HIGH 50: CPT | Mod: GC

## 2024-11-28 PROCEDURE — 250N000011 HC RX IP 250 OP 636: Performed by: FAMILY MEDICINE

## 2024-11-28 PROCEDURE — 250N000011 HC RX IP 250 OP 636

## 2024-11-28 PROCEDURE — 250N000013 HC RX MED GY IP 250 OP 250 PS 637: Performed by: PAIN MEDICINE

## 2024-11-28 PROCEDURE — 82565 ASSAY OF CREATININE: CPT | Performed by: FAMILY MEDICINE

## 2024-11-28 RX ORDER — LOPERAMIDE HYDROCHLORIDE 2 MG/1
2 CAPSULE ORAL 4 TIMES DAILY PRN
Status: DISCONTINUED | OUTPATIENT
Start: 2024-11-28 | End: 2024-12-04 | Stop reason: HOSPADM

## 2024-11-28 RX ADMIN — METRONIDAZOLE 500 MG: 500 TABLET, FILM COATED ORAL at 08:43

## 2024-11-28 RX ADMIN — ATORVASTATIN CALCIUM 40 MG: 40 TABLET, FILM COATED ORAL at 20:23

## 2024-11-28 RX ADMIN — APIXABAN 5 MG: 5 TABLET, FILM COATED ORAL at 08:43

## 2024-11-28 RX ADMIN — PREGABALIN 25 MG: 25 CAPSULE ORAL at 20:23

## 2024-11-28 RX ADMIN — METHADONE HYDROCHLORIDE 5 MG: 5 TABLET ORAL at 08:45

## 2024-11-28 RX ADMIN — LIDOCAINE: 50 OINTMENT TOPICAL at 20:35

## 2024-11-28 RX ADMIN — METHADONE HYDROCHLORIDE 5 MG: 5 TABLET ORAL at 20:23

## 2024-11-28 RX ADMIN — ACETAMINOPHEN 975 MG: 325 TABLET ORAL at 13:29

## 2024-11-28 RX ADMIN — CEFEPIME 2 G: 2 INJECTION, POWDER, FOR SOLUTION INTRAVENOUS at 06:33

## 2024-11-28 RX ADMIN — VANCOMYCIN HYDROCHLORIDE 1000 MG: 1 INJECTION, SOLUTION INTRAVENOUS at 08:50

## 2024-11-28 RX ADMIN — PREGABALIN 25 MG: 25 CAPSULE ORAL at 08:43

## 2024-11-28 RX ADMIN — METRONIDAZOLE 500 MG: 500 TABLET, FILM COATED ORAL at 20:23

## 2024-11-28 RX ADMIN — METRONIDAZOLE 500 MG: 500 TABLET, FILM COATED ORAL at 13:28

## 2024-11-28 RX ADMIN — TORSEMIDE 60 MG: 20 TABLET ORAL at 08:42

## 2024-11-28 RX ADMIN — CEFEPIME 2 G: 2 INJECTION, POWDER, FOR SOLUTION INTRAVENOUS at 17:23

## 2024-11-28 RX ADMIN — APIXABAN 5 MG: 5 TABLET, FILM COATED ORAL at 20:23

## 2024-11-28 RX ADMIN — LIDOCAINE: 50 OINTMENT TOPICAL at 08:47

## 2024-11-28 RX ADMIN — LOPERAMIDE HYDROCHLORIDE 2 MG: 2 CAPSULE ORAL at 17:38

## 2024-11-28 RX ADMIN — METHADONE HYDROCHLORIDE 5 MG: 5 TABLET ORAL at 17:22

## 2024-11-28 RX ADMIN — DOCUSATE SODIUM 100 MG: 100 CAPSULE, LIQUID FILLED ORAL at 08:42

## 2024-11-28 RX ADMIN — METHADONE HYDROCHLORIDE 5 MG: 5 TABLET ORAL at 13:28

## 2024-11-28 RX ADMIN — ACETAMINOPHEN 975 MG: 325 TABLET ORAL at 20:23

## 2024-11-28 RX ADMIN — LIDOCAINE: 50 OINTMENT TOPICAL at 13:30

## 2024-11-28 RX ADMIN — ACETAMINOPHEN 975 MG: 325 TABLET ORAL at 08:44

## 2024-11-28 RX ADMIN — INSULIN DEGLUDEC 28 UNITS: 100 INJECTION, SOLUTION SUBCUTANEOUS at 08:39

## 2024-11-28 ASSESSMENT — ACTIVITIES OF DAILY LIVING (ADL)
ADLS_ACUITY_SCORE: 58
ADLS_ACUITY_SCORE: 67
ADLS_ACUITY_SCORE: 58
ADLS_ACUITY_SCORE: 58
ADLS_ACUITY_SCORE: 67
ADLS_ACUITY_SCORE: 58
ADLS_ACUITY_SCORE: 58
ADLS_ACUITY_SCORE: 67
ADLS_ACUITY_SCORE: 67
ADLS_ACUITY_SCORE: 58
ADLS_ACUITY_SCORE: 58
ADLS_ACUITY_SCORE: 67
ADLS_ACUITY_SCORE: 67
ADLS_ACUITY_SCORE: 58
ADLS_ACUITY_SCORE: 58
ADLS_ACUITY_SCORE: 67
ADLS_ACUITY_SCORE: 58
ADLS_ACUITY_SCORE: 67

## 2024-11-28 NOTE — PROGRESS NOTES
[facility]    Progress Note - Hospitalist Service       Date of Admission:  11/25/2024    Assessment & Plan   Linda Loredo is a 79 year old female who has a history of poorly controlled DM2, HFpEF, paroxysmal atrial fibrillation, HTN, HLD, chronic lymphedema and venous stasis ulcers, and is admitted for pain management and complex wound care.     Venous stasis ulcer  Follows with [facility] wound care, pain is much worse now.  Ulcer of RLE has been present for 15 months (see photo of right calf dated 9/25/2023).  An excisional debridement of the area was performed in July 2024.  Low suspicion for acute infection given patient is on broad-spectrum antibiotic regimen and is afebrile with barely elevated WBCs, normal range procalcitonin and CRP, and absence of purulent or foul-smelling exudates. Patient remains in exquisite pain particularly related to dressing changes.  I had at length discussion about interventional options, as well as where to go from here with patient and daughter, Lu.  Clarified vascular surgery note for patient understanding.  Orthopedic surgery consult for second opinion, appreciate expertise  Vascular surgery and [facility] consult, assistance and recs appreciated  Vascular surgery unable to do AKA, as patient has swelling and inflammation that would prevent closure of the wound, and would likely result in the same issue the patient is having now.  Patient has concerns about TCU and quality of care.  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  Cultures were polymicrobial and patient has documented history of ESBL and MRSA.  Continue PTA vancomycin, cefepime, metronidazole until stump closure per ID  Routine cares per Vascular, WOC instructions     Type 2 diabetes mellitus  A1c 8.4%.   Very long history of above goal A1c. Sees [facility] endocrinology.  Previously was taking Ozempic, but now only takes insulin. Will continue same insulin regimen has previous admissions.  Tresiba 28 units every morning  Medium sliding scale insulin      Chronic stable problems  Paroxysmal atrial fibrillation: Continue PTA apixaban  Anemia of chronic disease: Noted  Hyperlipidemia: Continue PTA atorvastatin  Lymphedema, HFpEF: Continue PTA torsemide  CKD 3: Noted, trend creatinine           Diet: Regular Diet Adult  Snacks/Supplements Adult: Glucerna; With Meals    DVT Prophylaxis: DOAC  Braga Catheter: Not present  Fluids: PO  Lines: PRESENT      PICC 11/18/24 Single Lumen Right Basilic Antibiotic-Site Assessment: WDL      Cardiac Monitoring: None  Code Status: Full Code      Clinically Significant Risk Factors                   # Hypertension: Noted on problem list           # DMII: A1C = 8.4 % (Ref range: <5.7 %) within past 6 months   # Obesity: Estimated body mass index is 33.91 kg/m  as calculated from the following:    Height as of 11/13/24: 1.676 m (5' 6\").    Weight as of this encounter: 95.3 kg (210 lb 1.6 oz)., PRESENT ON ADMISSION     # Financial/Environmental Concerns: none         Social Drivers of Health    Received from [facility], [facility]    Social Connections         Disposition Plan     Medically Ready for Discharge: Anticipated in 2-4 Days         The patient's care was discussed with the Attending Physician, Dr. Mariana Tran .    Burt Gibson DO  Hospitalist Service  [facility]  Securely message with [employer] (more info)  Text page " via [employer] Paging/Directory   ______________________________________________________________________    Interval History   No acute overnight events.  Spoke with patient and daughter, Lu.  They wanted a better understanding of why vascular was unable to do an AKA.  Answered all questions to the best of my ability.  Patient's pain seems to be under control with current pain regimen.  She has no new complaints at this time.    Physical Exam   Vital Signs: Temp: 98.6  F (37  C) Temp src: Oral BP: 114/55 Pulse: 84   Resp: 16 SpO2: 94 % O2 Device: None (Room air)    Weight: 210 lbs 1.57 oz    Constitutional: awake, alert, cooperative, no apparent distress, and appears stated age  ENT: Normocephalic, without obvious abnormality, atraumatic, oral pharynx with moist mucous membranes, tonsils without erythema or exudates, gums normal and poor dentition.  Respiratory: No increased work of breathing, good air exchange, clear to auscultation bilaterally, no crackles or wheezing  Cardiovascular: Normal apical impulse, regular rate and rhythm, normal S1 and S2, no S3 or S4, and no murmur noted  GI: No scars, normal bowel sounds, soft, non-distended, non-tender, no masses palpated, no hepatosplenomegally  Musculoskeletal: Left BKA. Right BKA with posterolateral venous stasis ulcer, anterior superficial skin necrosis, and open distal wound. Wet bandages applied.     Data     I have personally reviewed the following data over the past 24 hrs:    N/A  \   N/A   / N/A     N/A N/A N/A /  194 (H)   N/A N/A 0.87 \       Imaging results reviewed over the past 24 hrs:   Recent Results (from the past 24 hours)   US Lower Extremity Venous Duplex Right    Narrative    EXAM: US LOWER EXTREMITY VENOUS DUPLEX RIGHT  LOCATION: [facility]  DATE: 11/27/2024    INDICATION: Evaluate possible DVT. Right lower extremity swelling. History of below-the-knee amputation.  COMPARISON: None.  TECHNIQUE: Venous Duplex ultrasound  of the right lower extremity with and without compression, augmentation and duplex. Color flow and spectral Doppler with waveform analysis performed.    FINDINGS: Exam includes the common femoral, femoral, popliteal, and contralateral common femoral veins as well as segmentally visualized deep calf veins and greater saphenous vein.     RIGHT: No deep vein thrombosis. No superficial thrombophlebitis. No popliteal cyst.      Impression    IMPRESSION:  1.  No deep venous thrombosis in the right lower extremity.

## 2024-11-28 NOTE — PROGRESS NOTES
Care Management Follow Up    Length of Stay (days): 3    Expected Discharge Date: 12/02/2024     Concerns to be Addressed:       Patient plan of care discussed at interdisciplinary rounds: Yes    Anticipated Discharge Disposition:     Return to [facility]           Anticipated Discharge Services:    Anticipated Discharge DME: None    Patient/family educated on Medicare website which has current facility and service quality ratings: no  Education Provided on the Discharge Plan:    Patient/Family in Agreement with the Plan: yes    Referrals Placed by CM/SW: Post Acute Facilities  Private pay costs discussed: Not applicable      Handoff Completed: No, handoff not indicated or clinically appropriate    Additional Information:  Received a call from [facility]. [facility] stated that there is a shortage of Hydromorphone and they are unable to get more, so something else will have to be ordered for pt's pain management. Resident Team is aware.    Next Steps: continue to follow for medical readiness.     Jayme Zazueta RN

## 2024-11-28 NOTE — PLAN OF CARE
Goal Outcome Evaluation:      Plan of Care Reviewed With: patient  Patient is alert and oriented and able to communicate her needs to staff. She received Methadone and Tylenol for right BKA pain. Wound Vac is intact with serosangous liquid. Patient received Colace this morning and has had 8 loose stools. MD updated and will be ordering Imodium prn. Mepilex on inner right thigh and buttocks . Patient is incontinent of bowel and bladder. Blood sugars were 185 ad 194.  Patient will have an Ortho Consult tomorrow as she and her family want a second opinion if a AKA is needed. Will, continue to monitor.

## 2024-11-28 NOTE — PLAN OF CARE
VSS on RA. A&OX4. Pain controlled with scheduled lyrica & methadone. Refuses repositioning- able to weight shift self but needs encouragement. 1 R buttocks & 2 R posterior thigh shear injury discovered upon assessment- mepis applied. Educated on repositioning and prevention. Purewick placed with appropriate output. Insulin changed to ACHS sugar checks- BG elevated in 200s. PICC dressing changed. Scant output in wound vac. No other acute changes.   Problem: Adult Inpatient Plan of Care  Goal: Optimal Comfort and Wellbeing  Outcome: Progressing  Intervention: Monitor Pain and Promote Comfort  Recent Flowsheet Documentation  Taken 11/27/2024 2055 by Alyssa Barber RN  Pain Management Interventions: care clustered  Intervention: Provide Person-Centered Care  Recent Flowsheet Documentation  Taken 11/28/2024 0000 by Alyssa Barber RN  Trust Relationship/Rapport:   care explained   questions encouraged   questions answered  Taken 11/27/2024 2000 by Alyssa Barber RN  Trust Relationship/Rapport:   care explained   questions encouraged   questions answered  Goal: Readiness for Transition of Care  Outcome: Progressing     Problem: Skin Injury Risk Increased  Goal: Skin Health and Integrity  Outcome: Progressing  Intervention: Plan: Nurse Driven Intervention: Moisture Management  Recent Flowsheet Documentation  Taken 11/28/2024 0000 by Alyssa Barber RN  Moisture Interventions: Urinary collection device  Taken 11/27/2024 2055 by Alyssa Barber RN  Bathing/Skin Care:   incontinence care   linen changed  Taken 11/27/2024 2000 by Alyssa Barber RN  Moisture Interventions: Urinary collection device  Intervention: Plan: Nurse Driven Intervention: Friction and Shear  Recent Flowsheet Documentation  Taken 11/28/2024 0000 by Alyssa Barber RN  Friction/Shear Interventions: HOB 30 degrees or less  Taken 11/27/2024 2000 by Alyssa Barber RN  Friction/Shear Interventions: HOB 30 degrees or  less  Intervention: Optimize Skin Protection  Recent Flowsheet Documentation  Taken 11/28/2024 0000 by Alyssa Barber RN  Skin Protection: adhesive use limited  Activity Management: activity adjusted per tolerance  Head of Bed (HOB) Positioning: HOB at 45 degrees  Taken 11/27/2024 2055 by Alyssa Barber RN  Activity Management: activity adjusted per tolerance  Head of Bed (HOB) Positioning: HOB at 45 degrees  Taken 11/27/2024 2000 by Alyssa Barber RN  Skin Protection: adhesive use limited  Activity Management: activity adjusted per tolerance  Head of Bed (HOB) Positioning: HOB at 45 degrees     Problem: Pain Acute  Goal: Optimal Pain Control and Function  Outcome: Progressing  Intervention: Optimize Psychosocial Wellbeing  Recent Flowsheet Documentation  Taken 11/28/2024 0000 by Alyssa Barber RN  Supportive Measures:   active listening utilized   positive reinforcement provided  Taken 11/27/2024 2000 by Alyssa Barber RN  Supportive Measures:   active listening utilized   positive reinforcement provided  Intervention: Develop Pain Management Plan  Recent Flowsheet Documentation  Taken 11/27/2024 2055 by Alyssa Barber RN  Pain Management Interventions: care clustered  Intervention: Prevent or Manage Pain  Recent Flowsheet Documentation  Taken 11/28/2024 0000 by Alyssa Barber RN  Sensory Stimulation Regulation: television on  Sleep/Rest Enhancement: comfort measures  Medication Review/Management: medications reviewed  Taken 11/27/2024 2000 by Alyssa Barber RN  Sensory Stimulation Regulation: television on  Sleep/Rest Enhancement: comfort measures  Medication Review/Management: medications reviewed     Problem: Infection  Goal: Absence of Infection Signs and Symptoms  Outcome: Progressing  Intervention: Prevent or Manage Infection  Recent Flowsheet Documentation  Taken 11/28/2024 0000 by Alyssa Barber RN  Isolation Precautions: contact precautions  maintained  Taken 11/27/2024 2000 by Alyssa Barber, RN  Isolation Precautions: contact precautions maintained   Goal Outcome Evaluation:

## 2024-11-28 NOTE — CONSULTS
"ORTHOPEDIC CONSULTATION    Consultation  Linda Loredo,  1945, MRN [id number]    @[facility] @  Venous stasis ulcer of right calf, unspecified ulcer stage, unspecified whether varicose veins present (H) [I83.012, L97219]    PCP: Louann Peraza Physician, None   Code status:  Full Code       Extended Emergency Contact Information  Primary Emergency Contact: Lu Majano  Address: [address] [place] [address]  Mobile Phone: [phone]  Relation: Daughter  Secondary Emergency Contact: [facility]  Address: [facility]  Home Phone: [phone]  Relation: Other         ASSESSMENT: S/P right BKA with post operative delayed wound healing, severe pain and lymphedema    PLAN:  I had a lengthy conversation with Awa and her daughter Julieta by phone. There was quite a bit to discuss and we went through things together. Awa is quite frustrated it seems as the wound cleaning and dressing changing process at her TCU has been extraordinarily painful. The pain she feels during this process appears to have driven a large amount of fear and anxiety over the dressing changes. It sounds as if the possibility of an AKA was initially offered during this in patient visit but that was then removed as a possibility due to the following:       \"we would not recommend an above knee amputation at this time for this patient for several reasons. There is significant associated morbidity with an above knee amputation, and unlikely patient would walk again, she did express understanding of this however there are other major concerns - when amputations are done for pain, they often can actually exacerbate the pain and the pain then becomes worse. The other concern is that with her edema and swelling of her RLE as it is, including of her thigh, that we may not be able to close an above knee amputation either, which creates the same problem as we " "have now, but higher up with fewer options for moving forward.\"          She continues to be in pain and along with that, there is some confusion about what could be done as initial and current offers have changed.  I suggested an approach that would be very conservative in light of the fact that she has a chronic pain history, history of poorly healing wounds and chronic lymphedema in the setting of obesity and poorly controlled diabetes. For example, I suggested that they wait until they have spoken to and seen their primary surgeon in person before they make any decisions about moving forward with any plan at this time. I reminded them of the reasons as listed above by their treatment team as to why doing an AKA or any surgery at this time would be risky and unlikely to improve things.  Furthermore, I think the wound is complex enough as is the wound vac process that it merits a wound clinic dressing change rather than TCU. With that in mind, I suggested a visit to the wound clinic, however difficult to achieve, would likely result in far less painful dressing changes. Awa and Lu seemed to agree. In fact, if the wound changes didn't hurt as much, Awa might be far more likely to hold off on any surgical decision about a revision amputation. She may not even need it at all which is what I suspect will be the case. For today, I suggested they hold off on any decisions and schedule formal wound vac dressing changes with the wound clinic and contact their vascular surgeon to arrange a follow up visit to discuss things in person.        Thank you for including [facility] in the care of Linda Loredo. It has been a pleasure participating in her care.      DATE OF SERVICE: 11/28/2024    CHIEF COMPLAINT: Venous stasis ulcer of right calf, unspecified ulcer stage, unspecified whether varicose veins present (H) [I83.012, L97.219]    CONSULTING PHYSICIAN: No admitting provider for patient " encounter.    HPI: Lidna Loredo is a 79 year old female who has a history of poorly controlled DM2, HFpEF, paroxysmal atrial fibrillation, HTN, HLD, chronic lymphedema and venous stasis ulcers, who is s/p RBKA 11/14/24 for gangrene of the RLE foot ulcer with osteomyelitis. She is now readmitted with concerns for adaptic dressing being stuck in wound, severe pain with dressing changes.       PAST MEDICAL HISTORY:  Past Medical History:   Diagnosis Date    Abscess of left foot 10/31/2022    Acute cystitis without hematuria 07/11/2024    Acute kidney injury (H) 07/11/2024    Adverse effect of anticoagulants, initial encounter 04/16/2024    GUSTAVO (acute kidney injury) (H) 05/26/2023    Allergic rhinitis 04/08/2008    Anemia 07/11/2024    Anticoagulation monitoring, INR range 2-3 07/06/2022    Anxiety 10/20/2011    Cardiac murmur, unspecified 05/29/2019    Cellulitis - bilateral lower extremities 05/27/2023    Cellulitis of buttock 05/26/2023    Chronic atrial fibrillation (H) 05/30/2022    New onset during 5/2022 admission      Chronic kidney disease, stage 3b (H) 06/12/2024    Chronic pain 04/26/2010    Chronic right-sided heart failure (H) 11/16/2023    Constipation 04/16/2024    Decubitus ulcers - 5 present on buttocks/perineal region, numerous scabbed over and unstagable on shin and bilateral feet with some bloody blisters noted on feet 05/27/2023    Depressive disorder, not elsewhere classified     Diabetic foot ulcer (H) 09/29/2023    Diabetic polyneuropathy associated with type 2 diabetes mellitus (H) 04/07/2006 February 26, 2007: on gabapentin.  She has been switched from gabapentin to lyrica.       Elevated liver enzymes 10/20/2011    Essential hypertension with goal blood pressure less than 140/90 05/09/2005    Fibromyalgia 10/20/2011    Fracture of fibula, distal, left, closed 10/20/2011    ORIF 10/13/11      Herpes zoster 06/20/2005 February 26, 2007: On gabapentin and lidoderm. She has been  switched from gabapentin to lyrica.       Herpes zoster without mention of complication     Hx of osteomyelitis 2024    S.p left BKA       Hypercalcemia 2007    Hypoglycemia 2023    Hypotension 10/27/2011    Insomnia 07/15/2005    Lymphedema, not elsewhere classified 2024    Major depressive disorder, recurrent episode, moderate (H) 2008    Metabolic encephalopathy 2024    Microalbuminuria due to type 2 diabetes mellitus (H) 10/25/2016    Mild intermittent asthma     Mixed hyperlipidemia 2005    Mixed hyperlipidemia due to type 2 diabetes mellitus (H) 2008    Formatting of this note is different from the original.     22 ASCVD 10 year risk: 37.9%      Last Lipids:  Last Lipids:  Chol: 2021 130   63  HDL: 2021 46  Non-HDL: 2021 84  Chol/HDL Ratio: 2021 2.83  LDL DIRECT: . No results found in past 5 years   LDL CHOLESTEROL (mg/dL)   Date Value   2021 71      22   The 10-year ASCVD risk score ([place] LUIS Jr.    Mixed stress and urge urinary incontinence 2005: On Detrol LA.      Non-healing wound of left heel 2023    Obesity with BMI >35 and comorbidity 2006: Body mass index is 48.07 kg/(m^2).       Opioid dependence, uncomplicated (H) 2023    BERLIN (obstructive sleep apnea) 10/23/2007    Sleep study 2004 reportedly showing severe OBSTRUCTIVE SLEEP APNEA and recommend CPAP, Not available for review. Patient is untreated       Osteoarthritis 2006    Osteomyelitis (H) 10/24/2023    Osteopenia 2015    Other abnormalities of gait and mobility 11/15/2023    Other urinary incontinence     Pressure injury of deep tissue of sacral region 2024    Primary hyperparathyroidism (H) 2013    Work up 2013 Dr. Villareal      Pulmonary hypertension (H) 2024    Pulmonary hypertension by echo and mild to moderate pulmonary hypertension by  angiogram 2006      Sepsis without acute organ dysfunction, due to unspecified organism (H) 05/26/2023    Sepsis, due to unspecified organism, unspecified whether acute organ dysfunction present (H) 07/11/2024    Skin ulcer of right lower leg with fat layer exposed (H) 02/26/2024    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) 07/11/2024    left lower extremity amputation secondary to osteomyelitis 10/2023, bone biopsy grew MRSA.       Supratherapeutic INR 05/27/2023    Type 2 diabetes mellitus with complication, with long-term current use of insulin (H) 04/18/2005    diagnosed in 2001       Type II or unspecified type diabetes mellitus with renal manifestations, uncontrolled(250.42) (H)     Type II or unspecified type diabetes mellitus without mention of complication, not stated as uncontrolled     Umbilical hernia without obstruction and without gangrene 12/13/2018    Unspecified essential hypertension     Unspecified open wound, left foot, subsequent encounter 09/22/2023    UTI (urinary tract infection) - possible 05/26/2023    Venous stasis 04/16/2024    Vitamin D deficiency 09/08/2022    Warfarin-induced coagulopathy (H) 05/27/2023           ALLERGIES:   Review of patient's allergies indicates   Allergies   Allergen Reactions    Prednisone Nausea and Vomiting    Morphine Nausea and Vomiting    Morphine [Fumaric Acid] Nausea and Vomiting    Nitrofurantoin Unknown     Per nursing home         MEDICATIONS UPON ADMISSION:  Medications were reviewed.  They include:   Medications Prior to Admission   Medication Sig Dispense Refill Last Dose/Taking    acetaminophen (TYLENOL) 325 MG tablet Take 3 tablets (975 mg) by mouth 3 times daily. 180 tablet 0 11/25/2024 at  8:00 AM    apixaban ANTICOAGULANT (ELIQUIS) 5 MG tablet Take 1 tablet (5 mg) by mouth 2 times daily. 60 tablet 0 11/25/2024 at  8:00 AM    atorvastatin (LIPITOR) 40 MG tablet Take 1 tablet (40 mg) by mouth every evening   11/24/2024 at  8:00  PM    calcium carbonate (TUMS) 500 MG chewable tablet Take 1 tablet (500 mg) by mouth 4 times daily as needed for heartburn. 90 tablet 0 Unknown    ceFEPIme (MAXIPIME) 2 g vial Inject 2 g over 30 minutes into the vein every 8 hours for 21 days.   11/25/2024 at  8:00 AM    HYDROmorphone (DILAUDID) 2 MG tablet Take 1-2 tablets (2-4 mg) by mouth every 3 hours as needed for breakthrough pain. (Patient taking differently: Take 2 mg by mouth every 3 hours as needed for breakthrough pain.)   11/25/2024 at  7:04 AM    Insulin Aspart FlexPen 100 UNIT/ML SOPN Inject 5 Units subcutaneously 3 times daily (before meals).   11/24/2024 at  9:00 PM    insulin degludec (TRESIBA) 200 UNIT/ML pen Inject 34 Units subcutaneously every morning Hold for BG < 100   11/25/2024 at  7:00 AM    melatonin 1 MG TABS tablet Take 1 tablet (1 mg) by mouth nightly as needed for sleep   Unknown    methadone (DOLOPHINE) 5 MG tablet Take 1 tablet (5 mg) by mouth 4 times daily. 120 tablet 0 11/25/2024 at 12:00 PM    metroNIDAZOLE (FLAGYL) 500 MG tablet Take 1 tablet (500 mg) by mouth 3 times daily. 42 tablet 0 11/25/2024 at  8:00 AM    mineral oil-hydrophilic petrolatum (AQUAPHOR) external ointment Apply to left thigh wound BID   11/24/2024 Evening    ondansetron (ZOFRAN ODT) 4 MG ODT tab Take 1 tablet (4 mg) by mouth every 6 hours as needed for nausea or vomiting. 30 tablet 0 Unknown    polyethylene glycol (MIRALAX) 17 g packet Take 17 g by mouth 2 times daily as needed for constipation. 30 packet 3 Unknown    pregabalin (LYRICA) 25 MG capsule Take 1 capsule (25 mg) by mouth 2 times daily. 60 capsule 0 11/25/2024 at  8:00 AM    senna-docusate (SENOKOT-S/PERICOLACE) 8.6-50 MG tablet Take 1 tablet by mouth 2 times daily as needed for constipation. 60 tablet 0 Unknown    sennosides (SENOKOT) 8.6 MG tablet Take 2 tablets by mouth 2 times daily. 180 tablet 0 11/25/2024 at  8:00 AM    torsemide (DEMADEX) 20 MG tablet Take 60 mg by mouth daily   11/25/2024 at   8:00 AM    vancomycin (VANCOCIN) 1250 mg/250 mL IVPB Inject 1,250 mg over 90 minutes into the vein every 24 hours for 21 days. Weekly CBC with differential, BMP, CRP vancomycin trough/AUC   11/25/2024 at  2:35 PM    wound support modular (EXPEDITE) LIQD bottle Take 60 mLs by mouth daily (Patient taking differently: Take 60 mLs by mouth 3 times daily. With meals)   11/25/2024 at 10:30 AM    Zinc oxide 10 % CREA Externally apply topically 3 times daily Apply to coccyx area once per shift after brief changes   11/25/2024 Morning         SOCIAL HISTORY:   she  reports that she has never smoked. She has never used smokeless tobacco. She reports that she does not drink alcohol and does not use drugs.      FAMILY HISTORY:  family history includes Cancer in her maternal grandmother and paternal grandmother; Diabetes in her sister; Heart Disease in her father; Hypertension in her mother and sister; Psychotic Disorder in her sister; Respiratory in her sister.      REVIEW OF SYSTEMS:   Reviewed with patient. See HPI, otherwise negative       PHYSICAL EXAMINATION:  Vitals: Temp:  [98.6  F (37  C)] 98.6  F (37  C)  Pulse:  [84] 84  Resp:  [16] 16  BP: (114)/(55) 114/55  SpO2:  [94 %] 94 %  /55 (BP Location: Left arm)   Pulse 84   Temp 98.6  F (37  C) (Oral)   Resp 16   Wt 95.3 kg (210 lb 1.6 oz)   SpO2 94%   BMI 33.91 kg/m    Body mass index is 33.91 kg/m .    Mental status exam; she is alert, orient to time, person and place. Normal thought content, speech, affect, mood and dress are noted.    Exam of the right lower extremity shows the amputation stump with no active drainage. Wound vac to suction and without drainage. The soft tissue and skin around the knee and distal is boggy but not warm and appears well perfused. Appropriate amount of reactive erythema around sponge edges.     Recent Labs   Lab Test 11/26/24  0447 11/25/24  1832 11/15/24  0812 11/14/24  0748 11/13/24  0150 11/12/24  1438 08/12/24  0708  08/07/24  0602   WBC 6.9 11.2* 5.9   < > 7.5 9.4   < >  --    HGB 9.7* 10.4* 8.9*   < > 8.8* 9.8*   < >  --    INR  --   --   --   --  1.73* 1.69*  --  2.28*    239 299   < > 279 326   < >  --     < > = values in this interval not displayed.               KENNETH BATISTA MD, PA-C  Date: 11/28/2024  Time: 4:08 PM    CC1:   Mariana Tran MD    CC2:   [facility], [facility]

## 2024-11-29 ENCOUNTER — LAB REQUISITION (OUTPATIENT)
Dept: LAB | Facility: CLINIC | Age: 79
End: 2024-11-29
Payer: MEDICARE

## 2024-11-29 DIAGNOSIS — L08.9 LOCAL INFECTION OF THE SKIN AND SUBCUTANEOUS TISSUE, UNSPECIFIED: ICD-10-CM

## 2024-11-29 DIAGNOSIS — Z16.22 RESISTANCE TO VANCOMYCIN RELATED ANTIBIOTICS: ICD-10-CM

## 2024-11-29 DIAGNOSIS — R79.82 ELEVATED C-REACTIVE PROTEIN (CRP): ICD-10-CM

## 2024-11-29 DIAGNOSIS — L03.115 CELLULITIS OF RIGHT LOWER LIMB: ICD-10-CM

## 2024-11-29 LAB
CREAT SERPL-MCNC: 0.59 MG/DL (ref 0.51–0.95)
EGFRCR SERPLBLD CKD-EPI 2021: >90 ML/MIN/1.73M2
GLUCOSE BLDC GLUCOMTR-MCNC: 185 MG/DL (ref 70–99)
GLUCOSE BLDC GLUCOMTR-MCNC: 207 MG/DL (ref 70–99)
GLUCOSE BLDC GLUCOMTR-MCNC: 212 MG/DL (ref 70–99)
GLUCOSE BLDC GLUCOMTR-MCNC: 213 MG/DL (ref 70–99)

## 2024-11-29 PROCEDURE — 250N000011 HC RX IP 250 OP 636

## 2024-11-29 PROCEDURE — 99232 SBSQ HOSP IP/OBS MODERATE 35: CPT | Mod: GC

## 2024-11-29 PROCEDURE — 99233 SBSQ HOSP IP/OBS HIGH 50: CPT | Performed by: PAIN MEDICINE

## 2024-11-29 PROCEDURE — 250N000013 HC RX MED GY IP 250 OP 250 PS 637: Performed by: PAIN MEDICINE

## 2024-11-29 PROCEDURE — 250N000013 HC RX MED GY IP 250 OP 250 PS 637

## 2024-11-29 PROCEDURE — 82565 ASSAY OF CREATININE: CPT | Performed by: FAMILY MEDICINE

## 2024-11-29 PROCEDURE — 120N000004 HC R&B MS OVERFLOW

## 2024-11-29 PROCEDURE — 99222 1ST HOSP IP/OBS MODERATE 55: CPT | Mod: 95 | Performed by: REGISTERED NURSE

## 2024-11-29 PROCEDURE — 250N000011 HC RX IP 250 OP 636: Performed by: FAMILY MEDICINE

## 2024-11-29 RX ORDER — HYDROMORPHONE HYDROCHLORIDE 1 MG/ML
0.5 INJECTION, SOLUTION INTRAMUSCULAR; INTRAVENOUS; SUBCUTANEOUS DAILY PRN
Status: DISCONTINUED | OUTPATIENT
Start: 2024-11-29 | End: 2024-12-02

## 2024-11-29 RX ADMIN — ATORVASTATIN CALCIUM 40 MG: 40 TABLET, FILM COATED ORAL at 19:07

## 2024-11-29 RX ADMIN — APIXABAN 5 MG: 5 TABLET, FILM COATED ORAL at 07:44

## 2024-11-29 RX ADMIN — PREGABALIN 25 MG: 25 CAPSULE ORAL at 19:08

## 2024-11-29 RX ADMIN — METRONIDAZOLE 500 MG: 500 TABLET, FILM COATED ORAL at 13:53

## 2024-11-29 RX ADMIN — APIXABAN 5 MG: 5 TABLET, FILM COATED ORAL at 19:08

## 2024-11-29 RX ADMIN — PREGABALIN 25 MG: 25 CAPSULE ORAL at 07:45

## 2024-11-29 RX ADMIN — TORSEMIDE 60 MG: 20 TABLET ORAL at 07:45

## 2024-11-29 RX ADMIN — VANCOMYCIN HYDROCHLORIDE 1000 MG: 1 INJECTION, SOLUTION INTRAVENOUS at 08:52

## 2024-11-29 RX ADMIN — INSULIN DEGLUDEC 28 UNITS: 100 INJECTION, SOLUTION SUBCUTANEOUS at 07:21

## 2024-11-29 RX ADMIN — LIDOCAINE: 50 OINTMENT TOPICAL at 07:47

## 2024-11-29 RX ADMIN — CEFEPIME 2 G: 2 INJECTION, POWDER, FOR SOLUTION INTRAVENOUS at 00:37

## 2024-11-29 RX ADMIN — METHADONE HYDROCHLORIDE 5 MG: 5 TABLET ORAL at 07:44

## 2024-11-29 RX ADMIN — Medication 60 ML: at 09:12

## 2024-11-29 RX ADMIN — METHADONE HYDROCHLORIDE 5 MG: 5 TABLET ORAL at 19:06

## 2024-11-29 RX ADMIN — METRONIDAZOLE 500 MG: 500 TABLET, FILM COATED ORAL at 07:44

## 2024-11-29 RX ADMIN — ACETAMINOPHEN 975 MG: 325 TABLET ORAL at 19:07

## 2024-11-29 RX ADMIN — CEFEPIME 2 G: 2 INJECTION, POWDER, FOR SOLUTION INTRAVENOUS at 17:12

## 2024-11-29 RX ADMIN — METRONIDAZOLE 500 MG: 500 TABLET, FILM COATED ORAL at 19:07

## 2024-11-29 RX ADMIN — METHADONE HYDROCHLORIDE 5 MG: 5 TABLET ORAL at 17:12

## 2024-11-29 RX ADMIN — HYDROMORPHONE HYDROCHLORIDE 4 MG: 2 TABLET ORAL at 00:48

## 2024-11-29 RX ADMIN — METHADONE HYDROCHLORIDE 5 MG: 5 TABLET ORAL at 11:36

## 2024-11-29 RX ADMIN — CEFEPIME 2 G: 2 INJECTION, POWDER, FOR SOLUTION INTRAVENOUS at 07:42

## 2024-11-29 RX ADMIN — LIDOCAINE: 50 OINTMENT TOPICAL at 19:11

## 2024-11-29 RX ADMIN — ACETAMINOPHEN 975 MG: 325 TABLET ORAL at 07:44

## 2024-11-29 RX ADMIN — ACETAMINOPHEN 975 MG: 325 TABLET ORAL at 13:53

## 2024-11-29 ASSESSMENT — ACTIVITIES OF DAILY LIVING (ADL)
ADLS_ACUITY_SCORE: 69
ADLS_ACUITY_SCORE: 68
ADLS_ACUITY_SCORE: 70
ADLS_ACUITY_SCORE: 68
ADLS_ACUITY_SCORE: 68
ADLS_ACUITY_SCORE: 69
ADLS_ACUITY_SCORE: 68
ADLS_ACUITY_SCORE: 70
ADLS_ACUITY_SCORE: 68
ADLS_ACUITY_SCORE: 70
ADLS_ACUITY_SCORE: 68
ADLS_ACUITY_SCORE: 69
ADLS_ACUITY_SCORE: 68

## 2024-11-29 NOTE — PLAN OF CARE
Problem: Skin Injury Risk Increased  Goal: Skin Health and Integrity  Outcome: Progressing    Problem: Pain Acute  Goal: Optimal Pain Control and Function  Outcome: Progressing    Pt in good spirits today, as she is realizing she has good supports around her in hospital. Psych consulted for ongoing mental health needs. Wound vac at -125 continuous w/ scant output. Reporting good pain control w/ scheduled analgesics. Purewick in place, no BM. Plan to discharge to TCU in upcoming days.

## 2024-11-29 NOTE — PROGRESS NOTES
[facility] ACUTE INPATIENT PAIN SERVICE    [facility], [facility], [facility], [facility], [place]   PAIN follow up       Assessment/Plan:  Linda Loredo is a 79 year old female who was admitted on 11/25/2024.  I was asked to see the patient for pain secondary to wounds. Admitted for difficulty with dressing change, now has VAC in place and will have Q5 day changes. History of obesity, BKACKD, DM2.    shows ongoing use of methadone. Describes pain as improved and still fears return of pain.        PLAN:  Chronic wound pain with opioid tolerance.   Continue plan as current.   Multimodal Medication Therapy:   Adjuvants: continue Lyrica 25mg BID - con optimize to TID if sedation allows for it  Opioids: Methadone 5mg QID- Qtc was 390 recently   Dilaudid PO 2-4 mg q 3 h PRN   IV dilaudid only for dressing changes -Dilaudid 0.2 mg IV q 5 days  Non-medication interventions- Ice   Constipation Prophylaxis- senna and docusate scheduled, Miralax daily   Follow up /Discharge Recommendations - We recommend prescribing the following at the time of discharge:  follow up with Capri Cabrera NP         Subjective:    Pain is improved. She is happy with the wound and pain plan.  Great fear about pain returning. Discussed distraction from pain. Discussed anti-inflammatory pain diet. Discussed with patient and daughter Julieta.     History   Drug Use No         Tobacco Use      Smoking status: Never      Smokeless tobacco: Never        Objective:  Vital signs in last 24 hours:  B/P: 114/59, T: 97.8, P: 72, R: 18   Blood pressure 114/59, pulse 72, temperature 97.8  F (36.6  C), temperature source Oral, resp. rate 18, weight 95.3 kg (210 lb 1.6 oz), SpO2 99%.        Review of Systems:   As per subjective, all others negative.    Physical Exam    General: in no apparent distress and non-toxic    HEENT:  poor dentica   CV: no chest pain  Resp: no cough   GI: no complaints   Skin: No rashes or lesions  Extremities:  bilateral amputation and VAC in place          Imaging:  Personally Reviewed.    Results for orders placed or performed during the hospital encounter of 11/25/24   US Lower Extremity Venous Duplex Right    Impression    IMPRESSION:  1.  No deep venous thrombosis in the right lower extremity.        Lab Results:  Personally Reviewed.   Last Comprehensive Metabolic Panel:  Sodium   Date Value Ref Range Status   11/25/2024 138 135 - 145 mmol/L Final   02/22/2008 140 133 - 144 mmol/L Final     Potassium   Date Value Ref Range Status   11/25/2024 3.4 3.4 - 5.3 mmol/L Final   06/28/2022 4.3 3.5 - 5.0 mmol/L Final   02/22/2008 4.5 3.4 - 5.3 mmol/L Final     Chloride   Date Value Ref Range Status   11/25/2024 96 (L) 98 - 107 mmol/L Final   06/28/2022 97 (L) 98 - 107 mmol/L Final   02/22/2008 100 94 - 109 mmol/L Final     Carbon Dioxide   Date Value Ref Range Status   02/22/2008 28 20 - 32 mmol/L Final     Carbon Dioxide (CO2)   Date Value Ref Range Status   11/25/2024 31 (H) 22 - 29 mmol/L Final   06/28/2022 29 22 - 31 mmol/L Final     Anion Gap   Date Value Ref Range Status   11/25/2024 11 7 - 15 mmol/L Final   06/28/2022 13 5 - 18 mmol/L Final   02/22/2008 11 6 - 17 mmol/L Final     Glucose   Date Value Ref Range Status   06/28/2022 73 70 - 125 mg/dL Final   02/22/2008 298 (H) 60 - 99 mg/dL Final     GLUCOSE BY METER POCT   Date Value Ref Range Status   11/29/2024 185 (H) 70 - 99 mg/dL Final     Urea Nitrogen   Date Value Ref Range Status   11/25/2024 32.3 (H) 8.0 - 23.0 mg/dL Final   06/28/2022 42 (H) 8 - 28 mg/dL Final   02/22/2008 15 7 - 30 mg/dL Final     Creatinine   Date Value Ref Range Status   11/29/2024 0.59 0.51 - 0.95 mg/dL Final   02/22/2008 0.82 0.60 - 1.30 mg/dL Final     GFR Estimate   Date Value Ref Range Status   11/29/2024 >90 >60 mL/min/1.73m2 Final     Comment:     eGFR calculated using 2021 CKD-EPI equation.   02/22/2008 75 >60 mL/min/1.7m2 Final     Calcium   Date Value Ref Range Status   11/25/2024  "10.1 8.8 - 10.4 mg/dL Final     Comment:     Reference intervals for this test were updated on 7/16/2024 to reflect our healthy population more accurately. There may be differences in the flagging of prior results with similar values performed with this method. Those prior results can be interpreted in the context of the updated reference intervals.   02/22/2008 10.4 8.5 - 10.4 mg/dL Final        UA: No results found for: \"UAMP\", \"UBARB\", \"BENZODIAZEUR\", \"UCANN\", \"UCOC\", \"OPIT\", \"UPCP\"           Please see A&P for additional details of medical decision making.  MANAGEMENT DISCUSSED with the following over the past 24 hours: patient and daughter   NOTE(S)/MEDICAL RECORDS REVIEWED over the past 24 hours: medicine, ortho, nursing and care coordination   Tests personally interpreted in the past 24 hours:  - ultrasound showing no DVT  Tests REVIEWED in the past 24 hours:  - CrtCl  SUPPLEMENTAL HISTORY, in addition to the patient's history, over the past 24 hours obtained from:   - daughter Lu  Medical complexity over the past 24 hours:  -------------------------- HIGH RISK FOR MORBIDITY -------------------------------------------------------------  - Parenteral (IV) CONTROLLED SUBSTANCES ordered           Tasia ROSA, CNS, CNP  Acute Care Pain Management  Team  Hours of pain coverage Mon-Fri 8-1600  After hours contact the primary team  [facility] (RASHID, L[place], [place], RH)   Page via [employer] web console -Click for [employer]            "

## 2024-11-29 NOTE — PROGRESS NOTES
Care Management Follow Up    Length of Stay (days): 4    Expected Discharge Date: 12/02/2024     Concerns to be Addressed:       Patient plan of care discussed at interdisciplinary rounds: Yes    Anticipated Discharge Disposition: Transitional Care      Anticipated Discharge Services:   TCU   Anticipated Discharge DME: None    Patient/family educated on Medicare website which has current facility and service quality ratings: no  Education Provided on the Discharge Plan:  Yes   Patient/Family in Agreement with the Plan: yes    Referrals Placed by CM/SW: Post Acute Facilities  Private pay costs discussed: transportation costs    Discussed  Partnership in Safe Discharge Planning  document with patient/family: No     Handoff Completed: No, handoff not indicated or clinically appropriate    Additional Information:  JAYESH met with Pt and she called her daughter on the phone- Lu and discussed discharge planning.  Pt currently has bedhold at [facility].  Pt would like to see if there are TCU's closer to the [facility] to be closer to daughter.  Referrals sent. No PAS needed.      Pt has a wound vac. Daughter states that coordination plan is to have wound vac changed every 5 days at wound clinic and is in the works.     Pt and daughter are requesting for Pt to talk with Psych as Pt is anxious, down about her leg and pain.      11:15 AM  JAYESH called and left  for [facility] Liaison.     2:59 PM  Trini called back and [facility] cannot admit.  JAYESH talked with Kathleen at [facility] and has reached out to staff as Pt has been there in the past,     Next Steps: seeing if there is a closer TCU.     LIZY Ac

## 2024-11-29 NOTE — PROGRESS NOTES
[facility]    Progress Note - Hospitalist Service       Date of Admission:  11/25/2024    Assessment & Plan   Linda Loredo is a 79 year old female who has a history of poorly controlled DM2, HFpEF, paroxysmal atrial fibrillation, HTN, HLD, chronic lymphedema and venous stasis ulcers, and is admitted for pain management and complex wound care.       Venous stasis ulcer  Follows with [facility] wound care, pain is much worse now.  Ulcer of RLE has been present for 15 months (see photo of right calf dated 9/25/2023).  An excisional debridement of the area was performed in July 2024.  Low suspicion for acute infection given patient is on broad-spectrum antibiotic regimen and is afebrile with barely elevated WBCs, normal range procalcitonin and CRP, and absence of purulent or foul-smelling exudates. Patient remains in exquisite pain particularly related to dressing changes.  I had at length discussion about interventional options, as well as where to go from here with patient and daughter, Lu.  Clarified vascular surgery note for patient understanding.  Orthopedic surgery consult for second opinion, appreciate expertise  No surgical intervention planned this admission, team has signed off   Vascular surgery and WOC consult, assistance and recs appreciated  No surgical intervention planned this admission, vascular surgery team has signed off   WOC providing excellent cares with plan to develop formal wound care plan on 12/2  Patient considering return to prior TCU with new care recommendations vs pursuing a different facility   Of note, prior TCU has no access to hydromorphone on discharge, will need to alter pain regimen prior to discharge   Pain management team consulted, appreciate recs  Acetaminophen scheduled 975 mg 3 times daily  PTA methadone 5 mg 4 times daily  PTA pregabalin 25 mg twice daily; may optimize to 3 times daily if sedation allows  0.2 mg IV  "Dilaudid every 2 hours as needed for breakthrough pain, reserved for dressing changes  Docusate, Senna, Miralax for constipation prophylaxis     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  Cultures were polymicrobial and patient has documented history of ESBL and MRSA.  Continue PTA vancomycin, cefepime, metronidazole until stump closure per ID  Routine cares per Vascular, WOC instructions     Type 2 diabetes mellitus  A1c 8.4%.   Very long history of above goal A1c. Sees [facility] endocrinology.  Previously was taking Ozempic, but now only takes insulin. Will continue same insulin regimen has previous admissions.  Tresiba 28 units every morning  Medium sliding scale insulin      Chronic stable problems  Paroxysmal atrial fibrillation: Continue PTA apixaban  Anemia of chronic disease: Noted  Hyperlipidemia: Continue PTA atorvastatin  Lymphedema, HFpEF: Continue PTA torsemide  CKD 3: Noted, trend creatinine           Diet: Regular Diet Adult  Snacks/Supplements Adult: Glucerna; With Meals  Snacks/Supplements Adult: Expedite Bottle; With Meals    DVT Prophylaxis: DOAC  Braga Catheter: Not present  Fluids: PO  Lines: PRESENT      PICC 11/18/24 Single Lumen Right Basilic Antibiotic-Site Assessment: WDL      Cardiac Monitoring: None  Code Status: Full Code      Clinically Significant Risk Factors                   # Hypertension: Noted on problem list           # DMII: A1C = 8.4 % (Ref range: <5.7 %) within past 6 months   # Obesity: Estimated body mass index is 33.91 kg/m  as calculated from the following:    Height as of 11/13/24: 1.676 m (5' 6\").    Weight as of this encounter: 95.3 kg (210 lb 1.6 oz)., PRESENT ON ADMISSION       # Financial/Environmental Concerns: none         Social Drivers of Health    [facility] [employer] [facility], [employer] [facility]    Social Connections         Disposition " Plan     Medically Ready for Discharge: Anticipated in 2-4 Days         The patient's care was discussed with the Attending Physician, Dr. Mariana Tran .    Meghana Webb DO  Hospitalist Service  [facility]  Securely message with [employer] (more info)  Text page via [facility] Paging/Directory   ______________________________________________________________________    Interval History   No acute overnight events. Patient is doing well, pain is well controlled with current regimen. Wound cares to be done today. No fevers/chills, worsening of wound or new discharge. No new complaints today.       Physical Exam   Vital Signs: Temp: 97.8  F (36.6  C) Temp src: Oral BP: 114/59 Pulse: 72   Resp: 18 SpO2: 99 % O2 Device: None (Room air)    Weight: 210 lbs 1.57 oz    Constitutional: awake, alert, cooperative, no apparent distress, and appears stated age  ENT: Normocephalic, without obvious abnormality, atraumatic, oral pharynx with moist mucous membranes, tonsils without erythema or exudates, gums normal and poor dentition.  Respiratory: No increased work of breathing, good air exchange, clear to auscultation bilaterally, no crackles or wheezing  Cardiovascular: Normal apical impulse, regular rate and rhythm, normal S1 and S2, no S3 or S4, and no murmur noted  GI: No scars, normal bowel sounds, soft, non-distended, non-tender, no masses palpated, no hepatosplenomegally  Musculoskeletal: Left BKA. Right BKA with posterolateral venous stasis ulcer, anterior superficial skin necrosis, and open distal wound. Wet bandages applied.     Data     I have personally reviewed the following data over the past 24 hrs:    N/A  \   N/A   / N/A     N/A N/A N/A /  185 (H)   N/A N/A 0.59 \       Imaging results reviewed over the past 24 hrs:   No results found for this or any previous visit (from the past 24 hours).

## 2024-11-29 NOTE — CONSULTS
Initial Psychiatric Consult   Consult date: November 29, 2024         Reason for Consult, requesting source:    Illness related anxiety, patient/family request    Requesting source: Mariana Tran    Labs and imaging reviewed.  Provider contacted with recommendations.    Telemedicine Visit: The patient was seen for a visit utilizing the [employer] system. Permission from the patient to conduct the exam by telemedicine was obtained prior to proceeding.  Pat was also informed that insurance will be billed for this contact.   Patient Location):  [facility]   Provider Location: [facility]/remote  As the provider I attest to compliance with applicable laws and regulations related to telemedicine          HPI:   Psychiatry seeing patient today regarding illness related anxiety.    Linda Loredo is a 79 year old female who has a history of poorly controlled DM2, HFpEF, paroxysmal atrial fibrillation, HTN, HLD, chronic lymphedema and venous stasis ulcers, and is admitted for pain management and complex wound care.     BKA on 11/14/24 for gangrene of RLE with osteomyelitis with nonsalvageable foot.      Today, patient reports that anticipating her dressing changes as well as pain and discomfort is a great source of anxiety.   Patient denies thoughts of harming herself or wishing to end her life, but does feel overwhelmed. However, she is hopeful for her overall recovery.         Past Psychiatric History:   No current outpatient therapy or psychiatry interventions.        Substance Use and History:       Tobacco Use    Smoking status: Never    Smokeless tobacco: Never   Substance Use Topics    Alcohol use: No           Past Medical History:   PAST MEDICAL HISTORY:   Past Medical History:   Diagnosis Date    Abscess of left foot 10/31/2022    Acute cystitis without hematuria 07/11/2024    Acute kidney injury (H) 07/11/2024    Adverse effect of anticoagulants, initial encounter  04/16/2024    GUSTAVO (acute kidney injury) (H) 05/26/2023    Allergic rhinitis 04/08/2008    Anemia 07/11/2024    Anticoagulation monitoring, INR range 2-3 07/06/2022    Anxiety 10/20/2011    Cardiac murmur, unspecified 05/29/2019    Cellulitis - bilateral lower extremities 05/27/2023    Cellulitis of buttock 05/26/2023    Chronic atrial fibrillation (H) 05/30/2022    New onset during 5/2022 admission      Chronic kidney disease, stage 3b (H) 06/12/2024    Chronic pain 04/26/2010    Chronic right-sided heart failure (H) 11/16/2023    Constipation 04/16/2024    Decubitus ulcers - 5 present on buttocks/perineal region, numerous scabbed over and unstagable on shin and bilateral feet with some bloody blisters noted on feet 05/27/2023    Depressive disorder, not elsewhere classified     Diabetic foot ulcer (H) 09/29/2023    Diabetic polyneuropathy associated with type 2 diabetes mellitus (H) 04/07/2006 February 26, 2007: on gabapentin.  She has been switched from gabapentin to lyrica.       Elevated liver enzymes 10/20/2011    Essential hypertension with goal blood pressure less than 140/90 05/09/2005    Fibromyalgia 10/20/2011    Fracture of fibula, distal, left, closed 10/20/2011    ORIF 10/13/11      Herpes zoster 06/20/2005 February 26, 2007: On gabapentin and lidoderm. She has been switched from gabapentin to lyrica.       Herpes zoster without mention of complication     Hx of osteomyelitis 04/16/2024    S.p left BKA 2023      Hypercalcemia 02/24/2007    Hypoglycemia 05/27/2023    Hypotension 10/27/2011    Insomnia 07/15/2005    Lymphedema, not elsewhere classified 02/09/2024    Major depressive disorder, recurrent episode, moderate (H) 04/08/2008    Metabolic encephalopathy 07/11/2024    Microalbuminuria due to type 2 diabetes mellitus (H) 10/25/2016    Mild intermittent asthma     Mixed hyperlipidemia 12/05/2005    Mixed hyperlipidemia due to type 2 diabetes mellitus (H) 04/08/2008    Formatting of this note  is different from the original.     22 ASCVD 10 year risk: 37.9%      Last Lipids:  Last Lipids:  Chol: 2021 130   63  HDL: 2021 46  Non-HDL: 2021 84  Chol/HDL Ratio: 2021 2.83  LDL DIRECT: . No results found in past 5 years   LDL CHOLESTEROL (mg/dL)   Date Value   2021 71      22   The 10-year ASCVD risk score (Teddy LUIS Jr.    Mixed stress and urge urinary incontinence 2005: On Detrol LA.      Non-healing wound of left heel 2023    Obesity with BMI >35 and comorbidity 2006: Body mass index is 48.07 kg/(m^2).       Opioid dependence, uncomplicated (H) 2023    BERLIN (obstructive sleep apnea) 10/23/2007    Sleep study 2004 reportedly showing severe OBSTRUCTIVE SLEEP APNEA and recommend CPAP, Not available for review. Patient is untreated       Osteoarthritis 2006    Osteomyelitis (H) 10/24/2023    Osteopenia 2015    Other abnormalities of gait and mobility 11/15/2023    Other urinary incontinence     Pressure injury of deep tissue of sacral region 2024    Primary hyperparathyroidism (H) 2013    Work up 2013 Dr. Villareal      Pulmonary hypertension (H) 2024    Pulmonary hypertension by echo and mild to moderate pulmonary hypertension by angiogram       Sepsis without acute organ dysfunction, due to unspecified organism (H) 2023    Sepsis, due to unspecified organism, unspecified whether acute organ dysfunction present (H) 2024    Skin ulcer of right lower leg with fat layer exposed (H) 2024    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) 2024    left lower extremity amputation secondary to osteomyelitis 10/2023, bone biopsy grew MRSA.       Supratherapeutic INR 2023    Type 2 diabetes mellitus with complication, with long-term current use of insulin (H) 2005    diagnosed in        Type II or unspecified type  diabetes mellitus with renal manifestations, uncontrolled(250.42) (H)     Type II or unspecified type diabetes mellitus without mention of complication, not stated as uncontrolled     Umbilical hernia without obstruction and without gangrene 12/13/2018    Unspecified essential hypertension     Unspecified open wound, left foot, subsequent encounter 09/22/2023    UTI (urinary tract infection) - possible 05/26/2023    Venous stasis 04/16/2024    Vitamin D deficiency 09/08/2022    Warfarin-induced coagulopathy (H) 05/27/2023       PAST SURGICAL HISTORY:   Past Surgical History:   Procedure Laterality Date    AMPUTATE LEG BELOW KNEE Left 10/7/2023    Procedure: LEFT GUILLOTINE BELOW KNEE AMPUTATION.;  Surgeon: Lan Otto MD;  Location: [facility] OR    AMPUTATE LEG BELOW KNEE Left 10/14/2023    Procedure: debridement of left below the knee amputation;  Surgeon: Lan Otto MD;  Location: [facility] OR    AMPUTATION, LOWER EXTREMITY, USING GUILLOTINE TECHNIQUE Right 11/14/2024    Procedure: AMPUTATION, RIGHT LOWER EXTREMITY, USING GUILLOTINE TECHNIQUE;  Surgeon: Tierney Sanches MD;  Location: [facility] OR    APPLY WOUND VAC Left 1/2/2024    Procedure: WOUND VAC APPLICATION TO LEFT BELOW KNEE STUMP;  Surgeon: Lan Otto MD;  Location:  OR    CHOLECYSTECTOMY      GRAFT SKIN SPLIT THICKNESS FROM TRUNK TO HEAD Left 1/2/2024    Procedure: APPLICATION OF SPLIT-THICKNESS SKIN GRAFT TO LEFT BELOW KNEE STUMP, GRAFT FROM LEFT THIGH;  Surgeon: Lan Otto MD;  Location: [facility] OR    INCISION AND DRAINAGE FOOT, COMBINED Left 9/26/2023    Procedure: Surgical debridement of all nonviable skin and soft tissue and bone left foot;  Surgeon: Nolberto Thorne DPM;  Location: [facility] OR    IR LOWER EXTREMITY ANGIOGRAM LEFT  10/3/2023    IRRIGATION AND DEBRIDEMENT LOWER EXTREMITY, COMBINED Right 7/14/2024    Procedure: IRRIGATION AND DEBRIDEMENT, LOWER EXTREMITY, RIGHT LOWER LEG;  Surgeon: Brenda  MD Phuong;  Location: [facility] OR    ligation of fallopian tube      OPEN REDUCTION INTERNAL FIXATION ANKLE  10/13/11    left    PICC SINGLE LUMEN PLACEMENT  11/18/2024    pinning of left 2nd toe               Family History:   FAMILY HISTORY:   Family History   Problem Relation Age of Onset    Hypertension Mother     Heart Disease Father         heart attack and cardiomegaly    Diabetes Sister         type 2    Hypertension Sister     Respiratory Sister     Psychotic Disorder Sister         bipolar    Cancer Maternal Grandmother         throat    Cancer Paternal Grandmother         gastric           Social History:   Patient has 1 daughter who lives in [place].          Physical ROS:   The 10 point Review of Systems is negative other than noted in the HPI or here.           Medications:     Current Facility-Administered Medications   Medication Dose Route Frequency Provider Last Rate Last Admin    acetaminophen (TYLENOL) tablet 975 mg  975 mg Oral TID Lavon Pittman MD   975 mg at 11/29/24 0744    apixaban ANTICOAGULANT (ELIQUIS) tablet 5 mg  5 mg Oral BID Lavon Pittman MD   5 mg at 11/29/24 0744    atorvastatin (LIPITOR) tablet 40 mg  40 mg Oral QPM Lavon Pittman MD   40 mg at 11/28/24 2023    ceFEPIme (MAXIPIME) 2 g vial to attach to  mL bag for ADULTS or NS 50 mL bag for PEDS  2 g Intravenous Q8H Lavon Pittman MD 0 mL/hr at 11/26/24 0900 2 g at 11/29/24 0742    docusate sodium (COLACE) capsule 100 mg  100 mg Oral BID Tasia Perez APRN CNP   100 mg at 11/28/24 0842    insulin aspart (NovoLOG) injection (RAPID ACTING)  1-7 Units Subcutaneous TID AC Lavon Pittman MD   2 Units at 11/29/24 1136    insulin aspart (NovoLOG) injection (RAPID ACTING)  1-5 Units Subcutaneous At Bedtime Lavon Pittman MD   1 Units at 11/28/24 2146    insulin degludec (TRESIBA) 100 UNIT/ML injection 28 Units  28 Units Subcutaneous QA AC  Lavon Pittman MD   28 Units at 11/29/24 0721    lidocaine (XYLOCAINE) 5 % ointment   Topical TID Tasia Perez APRN CNP   Given at 11/29/24 0747    methadone (DOLOPHINE) tablet 5 mg  5 mg Oral 4x Daily Lavon Pittman MD   5 mg at 11/29/24 1136    metroNIDAZOLE (FLAGYL) tablet 500 mg  500 mg Oral TID Lavon Pittman MD   500 mg at 11/29/24 0744    polyethylene glycol (MIRALAX) Packet 17 g  17 g Oral Daily Tasia Perez APRN CNP   17 g at 11/26/24 1213    pregabalin (LYRICA) capsule 25 mg  25 mg Oral BID Lavon Pittman MD   25 mg at 11/29/24 0745    sennosides (SENOKOT) tablet 2 tablet  2 tablet Oral BID Lavon Pittman MD   2 tablet at 11/26/24 0913    sodium chloride (PF) 0.9% PF flush 3 mL  3 mL Intracatheter Q8H Lavon Pittman MD   3 mL at 11/29/24 0745    torsemide (DEMADEX) tablet 60 mg  60 mg Oral Daily Lavon Pittman MD   60 mg at 11/29/24 0745    vancomycin (VANCOCIN) 1,000 mg in NaCl 0.9% 200 mL intermittent infusion  1,000 mg Intravenous Q24H Mariana Tran MD   1,000 mg at 11/29/24 0852    wound support modular (EXPEDITE) bottle 60 mL  60 mL Oral Daily Mariana Tran MD   60 mL at 11/29/24 0912    wound support modular (EXPEDITE) bottle 60 mL  60 mL Oral Daily Lavon Pittman MD   60 mL at 11/29/24 0912              Allergies:     Allergies   Allergen Reactions    Prednisone Nausea and Vomiting    Morphine Nausea and Vomiting    Morphine [Fumaric Acid] Nausea and Vomiting    Nitrofurantoin Unknown     Per nursing home          Labs:     Recent Results (from the past 48 hours)   Glucose by meter    Collection Time: 11/27/24  1:58 PM   Result Value Ref Range    GLUCOSE BY METER POCT 246 (H) 70 - 99 mg/dL   Glucose by meter    Collection Time: 11/27/24  5:07 PM   Result Value Ref Range    GLUCOSE BY METER POCT 253 (H) 70 - 99 mg/dL   Glucose by meter    Collection Time:  11/27/24  8:57 PM   Result Value Ref Range    GLUCOSE BY METER POCT 259 (H) 70 - 99 mg/dL   Glucose by meter    Collection Time: 11/28/24  3:44 AM   Result Value Ref Range    GLUCOSE BY METER POCT 206 (H) 70 - 99 mg/dL   Creatinine    Collection Time: 11/28/24  6:33 AM   Result Value Ref Range    Creatinine 0.87 0.51 - 0.95 mg/dL    GFR Estimate 67 >60 mL/min/1.73m2   Glucose by meter    Collection Time: 11/28/24  7:24 AM   Result Value Ref Range    GLUCOSE BY METER POCT 185 (H) 70 - 99 mg/dL   Glucose by meter    Collection Time: 11/28/24 11:55 AM   Result Value Ref Range    GLUCOSE BY METER POCT 194 (H) 70 - 99 mg/dL   Glucose by meter    Collection Time: 11/28/24  5:26 PM   Result Value Ref Range    GLUCOSE BY METER POCT 224 (H) 70 - 99 mg/dL   Glucose by meter    Collection Time: 11/28/24  9:14 PM   Result Value Ref Range    GLUCOSE BY METER POCT 239 (H) 70 - 99 mg/dL   Creatinine    Collection Time: 11/29/24  4:11 AM   Result Value Ref Range    Creatinine 0.59 0.51 - 0.95 mg/dL    GFR Estimate >90 >60 mL/min/1.73m2   Glucose by meter    Collection Time: 11/29/24  6:42 AM   Result Value Ref Range    GLUCOSE BY METER POCT 185 (H) 70 - 99 mg/dL          Physical and Psychiatric Examination:     /59 (BP Location: Left arm)   Pulse 72   Temp 97.8  F (36.6  C) (Oral)   Resp 18   Wt 95.3 kg (210 lb 1.6 oz)   SpO2 99%   BMI 33.91 kg/m    Weight is 210 lbs 1.57 oz  Body mass index is 33.91 kg/m .    Physical Exam:  I have reviewed the physical exam as documented by by the medical team and agree with findings and assessment and have no additional findings to add at this time.         MSE:   Calm and cooperative.  Intermittently tearful throughout meeting.  Denies SI and SIB as well as auditory and visual hallucinations.  Insight and judgment are fair.           DSM-5 Diagnosis:   Major depressive disorder, by history  Generalized anxiety, by history; due to chronic medical condition, current           Assessment:   Psychiatry seeing patient today regarding illness related anxiety.  Patient does have historical diagnoses of major depressive disorder as well as generalized anxiety disorder, but does not have current outpatient treatment.  Her anxiety appears to be greatly impacted by her anticipation of wound dressing changes and associated pain, is a general sense of overwhelm regarding her continued need for medical intervention..  She may benefit from further guidance on a long-term plan for wound care, as well as pain management.    Given acute stress related to BKA on 11/14, and need for continued medical intervention, patient may benefit from outpatient therapy supports, as opposed to medications at this time.            Summary of Recommendations:   1) No new medications will be initiated at this time, per patient request    2) Appointments made for outpatient therapy and will appear in patient's AVS    3) Additional non-pharmacological interventions include, but are not limited to:   Lavender   Warm Blankets and/or weighted blankets   Activities of interest currently or in the past   Calming music   5,4,3,2,1    Grounding exercise: 5 things you see, 4 things you feel, 3 things you hear, 2 things you smell, 1 thing you taste         4) Today Pat does not show any symptoms of acute psychosis, nor suicidal or homicidal ideations. Therefore, based on all available evidence including the factors cited above, he does not appear to be at imminent risk for self-harm, does not meet criteria for a 72-hr hold, and therefore remains appropriate for ongoing outpatient level of care.     Additional steps taken to minimize risk include: close psychiatric follow up and crisis resources. Voluntary referral for outpatient care was offered and patient accepted these offers.      Resources:   Crisis Intervention: 674.974.9046 or 050-768-9608 (TTY: 153.162.7376).  Call anytime for help.  National [place] on Mental Illness  "(www.[place].org): 327.535.6333 or 416-640-2164.  Suicide Awareness Voices of Education (SAVE) (www.save.org): 617-151-ACAK (6449)  National Suicide Prevention Line (www.mentalhealthmn.org): 930-237-BGXP (5789)  Mental Health Consumer/Survivor Network of [place] (www.mhcsn.net): 616.110.1854 or 852-996-6846  [facility] Crisis Response 959 171-2072  Text 4 Life: txt \"LIFE\" to 95072 for immediate support and crisis intervention  Crisis text line: Text \"[place]\" to 589667. Free, confidential, 24/7.        [facility] (National [place] on Mental Illness) improves the lives of children and adults with mental illnesses and their families by providing free classes on mental illnesses and support groups for adults with mental illnesses, parents and family members. For more information: Phone: 545.788.7409 Toll free: 5-061-WQHN-HELPS Website: www.namihelps.orghttp://www.namihelps.org/        Page me or re-consult psychiatry as needed (psychiatry signing off).       Katey Viera, MIGUEL, MOE  Consult/Liaison Psychiatry  [facility]   Contact information available via [facility] Paging/Directory.  If I am not available, please call [facility] intake ([phone])              "

## 2024-11-29 NOTE — PROGRESS NOTES
Orthopedic Progress Note      Assessment:    S/P right BKA with post operative delayed wound healing, severe pain and lymphedema     Plan:   - Continued nonoperative treatment opted for at this time  - Recommend wound care evaluation following discharge  - Vascular surgery recommends continued dressing changes and wound care with ongoing compression and elevation of RLE   - Follow up scheduled with vascular surgery on 12/23       Subjective:  Patient is sitting up in bed. She is quite distressed at this time with her current situation. No change to her pain level at the RLE. No drainage reported to be in canister at wound vac. No other acute concerns at this time.     Objective:  /64 (BP Location: Left arm)   Pulse 78   Temp 97.9  F (36.6  C) (Oral)   Resp 18   Wt 95.3 kg (210 lb 1.6 oz)   SpO2 97%   BMI 33.91 kg/m    The patient is A&Ox3. Appears comfortable.   Sensation is intact.  Wound vac at stump with good suction. Scant drainage within tubing, no drainage in canister.   Soft compartments.     Pertinent Labs   Lab Results: personally reviewed.   Lab Results   Component Value Date    INR 1.73 (H) 11/13/2024    INR 1.69 (H) 11/12/2024    INR 2.28 (H) 08/07/2024     Lab Results   Component Value Date    WBC 6.9 11/26/2024    HGB 9.7 (L) 11/26/2024    HCT 30.8 (L) 11/26/2024    MCV 89 11/26/2024     11/26/2024     Lab Results   Component Value Date     11/25/2024    CO2 31 (H) 11/25/2024         Report completed by:  AMANDA BEVERLY PA-C  [facility]    Date: 11/29/2024  Time: 7:41 AM

## 2024-11-29 NOTE — PROGRESS NOTES
Respiratory Note     Patient seen for CPAP order. Pt states she does not use CPAP at home and that she does not want hospital CPAP. RT to follow if needed.     /64 (BP Location: Left arm)   Pulse 78   Temp 97.9  F (36.6  C) (Oral)   Resp 18   Wt 95.3 kg (210 lb 1.6 oz)   SpO2 97%   BMI 33.91 kg/m      Song Mukherjee RT on 11/29/2024 at 6:11 AM

## 2024-11-29 NOTE — PLAN OF CARE
Goal Outcome Evaluation:    Note from 2460-5817. VSS on RA. Denies shortness of breath. A&Ox4. Pt rated pain 7/10 in right leg, 4mg PO dilaudid given. No new skin issues noted. Dressings on RLE extremity are CDI. Full sensation per pt. Assist of 1 in bed, pt able to weight shift. Right single lumen PICC SL. Voiding adequately via purewick. Education of medication administration and use of call-light to reduce risk for falls and injury. No further issues noted.     Problem: Skin Injury Risk Increased  Goal: Skin Health and Integrity  Outcome: Progressing  Intervention: Plan: Nurse Driven Intervention: Moisture Management  Recent Flowsheet Documentation  Taken 11/29/2024 0000 by Leni Soto RN  Moisture Interventions:   Incontinence pad   Urinary collection device  Intervention: Plan: Nurse Driven Intervention: Friction and Shear  Recent Flowsheet Documentation  Taken 11/29/2024 0000 by Leni Soto RN  Friction/Shear Interventions: Silicone foam sacral dressing  Intervention: Optimize Skin Protection  Recent Flowsheet Documentation  Taken 11/29/2024 0000 by Leni Soto RN  Pressure Reduction Techniques: frequent weight shift encouraged  Activity Management: activity adjusted per tolerance  Head of Bed (HOB) Positioning: HOB at 60-90 degrees     Problem: Pain Acute  Goal: Optimal Pain Control and Function  Outcome: Progressing  Intervention: Develop Pain Management Plan  Recent Flowsheet Documentation  Taken 11/29/2024 0000 by Leni Soto RN  Pain Management Interventions:   medication (see MAR)   emotional support   pain management plan reviewed with patient/caregiver   repositioned   rest  Intervention: Prevent or Manage Pain  Recent Flowsheet Documentation  Taken 11/29/2024 0000 by Leni Soto RN  Medication Review/Management: medications reviewed     Problem: Adult Inpatient Plan of Care  Goal: Readiness for Transition of Care  Outcome: Progressing

## 2024-11-30 LAB
ANION GAP SERPL CALCULATED.3IONS-SCNC: 9 MMOL/L (ref 7–15)
BUN SERPL-MCNC: 48.7 MG/DL (ref 8–23)
CALCIUM SERPL-MCNC: 9.6 MG/DL (ref 8.8–10.4)
CHLORIDE SERPL-SCNC: 101 MMOL/L (ref 98–107)
CREAT SERPL-MCNC: 0.82 MG/DL (ref 0.51–0.95)
EGFRCR SERPLBLD CKD-EPI 2021: 72 ML/MIN/1.73M2
ERYTHROCYTE [DISTWIDTH] IN BLOOD BY AUTOMATED COUNT: 17.2 % (ref 10–15)
GLUCOSE BLDC GLUCOMTR-MCNC: 170 MG/DL (ref 70–99)
GLUCOSE BLDC GLUCOMTR-MCNC: 194 MG/DL (ref 70–99)
GLUCOSE BLDC GLUCOMTR-MCNC: 207 MG/DL (ref 70–99)
GLUCOSE BLDC GLUCOMTR-MCNC: 252 MG/DL (ref 70–99)
GLUCOSE BLDC GLUCOMTR-MCNC: 256 MG/DL (ref 70–99)
GLUCOSE SERPL-MCNC: 181 MG/DL (ref 70–99)
HCO3 SERPL-SCNC: 31 MMOL/L (ref 22–29)
HCT VFR BLD AUTO: 29.3 % (ref 35–47)
HGB BLD-MCNC: 9 G/DL (ref 11.7–15.7)
MCH RBC QN AUTO: 28 PG (ref 26.5–33)
MCHC RBC AUTO-ENTMCNC: 30.7 G/DL (ref 31.5–36.5)
MCV RBC AUTO: 91 FL (ref 78–100)
PLATELET # BLD AUTO: 188 10E3/UL (ref 150–450)
POTASSIUM SERPL-SCNC: 3.1 MMOL/L (ref 3.4–5.3)
POTASSIUM SERPL-SCNC: 3.5 MMOL/L (ref 3.4–5.3)
RBC # BLD AUTO: 3.22 10E6/UL (ref 3.8–5.2)
SODIUM SERPL-SCNC: 141 MMOL/L (ref 135–145)
VANCOMYCIN SERPL-MCNC: 16.8 UG/ML
WBC # BLD AUTO: 5.8 10E3/UL (ref 4–11)

## 2024-11-30 PROCEDURE — 80202 ASSAY OF VANCOMYCIN: CPT | Performed by: FAMILY MEDICINE

## 2024-11-30 PROCEDURE — 250N000011 HC RX IP 250 OP 636: Mod: JZ

## 2024-11-30 PROCEDURE — 250N000013 HC RX MED GY IP 250 OP 250 PS 637: Performed by: FAMILY MEDICINE

## 2024-11-30 PROCEDURE — 250N000013 HC RX MED GY IP 250 OP 250 PS 637

## 2024-11-30 PROCEDURE — 120N000004 HC R&B MS OVERFLOW

## 2024-11-30 PROCEDURE — 85014 HEMATOCRIT: CPT

## 2024-11-30 PROCEDURE — 250N000011 HC RX IP 250 OP 636: Performed by: FAMILY MEDICINE

## 2024-11-30 PROCEDURE — 250N000011 HC RX IP 250 OP 636

## 2024-11-30 PROCEDURE — 99232 SBSQ HOSP IP/OBS MODERATE 35: CPT | Mod: GC

## 2024-11-30 PROCEDURE — 80048 BASIC METABOLIC PNL TOTAL CA: CPT

## 2024-11-30 PROCEDURE — 84132 ASSAY OF SERUM POTASSIUM: CPT | Performed by: FAMILY MEDICINE

## 2024-11-30 RX ORDER — POTASSIUM CHLORIDE 1500 MG/1
40 TABLET, EXTENDED RELEASE ORAL ONCE
Status: COMPLETED | OUTPATIENT
Start: 2024-11-30 | End: 2024-11-30

## 2024-11-30 RX ADMIN — METRONIDAZOLE 500 MG: 500 TABLET, FILM COATED ORAL at 13:19

## 2024-11-30 RX ADMIN — LIDOCAINE: 50 OINTMENT TOPICAL at 22:05

## 2024-11-30 RX ADMIN — METRONIDAZOLE 500 MG: 500 TABLET, FILM COATED ORAL at 19:00

## 2024-11-30 RX ADMIN — ALTEPLASE 2 MG: 2.2 INJECTION, POWDER, LYOPHILIZED, FOR SOLUTION INTRAVENOUS at 05:29

## 2024-11-30 RX ADMIN — PREGABALIN 25 MG: 25 CAPSULE ORAL at 19:00

## 2024-11-30 RX ADMIN — POTASSIUM CHLORIDE 40 MEQ: 1500 TABLET, EXTENDED RELEASE ORAL at 15:52

## 2024-11-30 RX ADMIN — METHADONE HYDROCHLORIDE 5 MG: 5 TABLET ORAL at 09:06

## 2024-11-30 RX ADMIN — ACETAMINOPHEN 975 MG: 325 TABLET ORAL at 19:00

## 2024-11-30 RX ADMIN — METHADONE HYDROCHLORIDE 5 MG: 5 TABLET ORAL at 15:52

## 2024-11-30 RX ADMIN — INSULIN DEGLUDEC 28 UNITS: 100 INJECTION, SOLUTION SUBCUTANEOUS at 08:58

## 2024-11-30 RX ADMIN — ACETAMINOPHEN 975 MG: 325 TABLET ORAL at 13:18

## 2024-11-30 RX ADMIN — LIDOCAINE: 50 OINTMENT TOPICAL at 13:20

## 2024-11-30 RX ADMIN — VANCOMYCIN HYDROCHLORIDE 1000 MG: 1 INJECTION, SOLUTION INTRAVENOUS at 10:02

## 2024-11-30 RX ADMIN — Medication 60 ML: at 09:12

## 2024-11-30 RX ADMIN — CEFEPIME 2 G: 2 INJECTION, POWDER, FOR SOLUTION INTRAVENOUS at 00:32

## 2024-11-30 RX ADMIN — METRONIDAZOLE 500 MG: 500 TABLET, FILM COATED ORAL at 09:06

## 2024-11-30 RX ADMIN — ACETAMINOPHEN 975 MG: 325 TABLET ORAL at 09:04

## 2024-11-30 RX ADMIN — TORSEMIDE 60 MG: 20 TABLET ORAL at 09:32

## 2024-11-30 RX ADMIN — CEFEPIME 2 G: 2 INJECTION, POWDER, FOR SOLUTION INTRAVENOUS at 09:11

## 2024-11-30 RX ADMIN — PREGABALIN 25 MG: 25 CAPSULE ORAL at 09:05

## 2024-11-30 RX ADMIN — APIXABAN 5 MG: 5 TABLET, FILM COATED ORAL at 09:03

## 2024-11-30 RX ADMIN — Medication 60 ML: at 09:18

## 2024-11-30 RX ADMIN — ATORVASTATIN CALCIUM 40 MG: 40 TABLET, FILM COATED ORAL at 19:02

## 2024-11-30 RX ADMIN — LIDOCAINE: 50 OINTMENT TOPICAL at 09:00

## 2024-11-30 RX ADMIN — METHADONE HYDROCHLORIDE 5 MG: 5 TABLET ORAL at 13:19

## 2024-11-30 RX ADMIN — CEFEPIME 2 G: 2 INJECTION, POWDER, FOR SOLUTION INTRAVENOUS at 15:53

## 2024-11-30 RX ADMIN — APIXABAN 5 MG: 5 TABLET, FILM COATED ORAL at 19:00

## 2024-11-30 RX ADMIN — METHADONE HYDROCHLORIDE 5 MG: 5 TABLET ORAL at 19:00

## 2024-11-30 ASSESSMENT — ACTIVITIES OF DAILY LIVING (ADL)
ADLS_ACUITY_SCORE: 71
ADLS_ACUITY_SCORE: 71
ADLS_ACUITY_SCORE: 68
ADLS_ACUITY_SCORE: 71
ADLS_ACUITY_SCORE: 68
ADLS_ACUITY_SCORE: 66
ADLS_ACUITY_SCORE: 66
ADLS_ACUITY_SCORE: 69
ADLS_ACUITY_SCORE: 71
ADLS_ACUITY_SCORE: 69
ADLS_ACUITY_SCORE: 66
ADLS_ACUITY_SCORE: 68
ADLS_ACUITY_SCORE: 66
ADLS_ACUITY_SCORE: 68
ADLS_ACUITY_SCORE: 71
ADLS_ACUITY_SCORE: 66

## 2024-11-30 NOTE — PHARMACY-VANCOMYCIN DOSING SERVICE
Pharmacy Vancomycin Note  Date of Service 2024  Patient's  1945   79 year old, female    Indication: Skin and Soft Tissue Infection  Day of Therapy: came in on PTA vancomycin   Current vancomycin regimen:  1000 mg IV q24h  Current vancomycin monitoring method: AUC  Current vancomycin therapeutic monitoring goal: 400-600 mg*h/L    [employer] Prediction of Current Vancomycin Regimen    Regimen: 1000 mg IV every 24 hours.  Start time: 08:52 on 2024  Exposure target: AUC24 (range)400-600 mg/L.hr   AUC24,ss: 504 mg/L.hr  Probability of AUC24 > 400: 99 %  Ctrough,ss: 15.3 mg/L  Probability of Ctrough,ss > 20: 2 %  Probability of nephrotoxicity (Lodise COSTA ): 11 %    Current estimated CrCl = Estimated Creatinine Clearance: 64.6 mL/min (based on SCr of 0.82 mg/dL).    Creatinine for last 3 days  2024:  6:33 AM Creatinine 0.87 mg/dL  2024:  4:11 AM Creatinine 0.59 mg/dL  2024:  4:22 AM Creatinine 0.82 mg/dL    Recent Vancomycin Levels (past 3 days)  2024:  4:22 AM Vancomycin 16.8 ug/mL    Vancomycin IV Administrations (past 72 hours)                     vancomycin (VANCOCIN) 1,000 mg in NaCl 0.9% 200 mL intermittent infusion (mg) 1,000 mg Given 24 0852     1,000 mg Given 24 0850     1,000 mg Given 24 1025                    Nephrotoxins and other renal medications (From now, onward)      Start     Dose/Rate Route Frequency Ordered Stop    24 0830  vancomycin (VANCOCIN) 1,000 mg in NaCl 0.9% 200 mL intermittent infusion         1,000 mg  over 60 Minutes Intravenous EVERY 24 HOURS 24 0919      24 0800  torsemide (DEMADEX) tablet 60 mg        Note to Pharmacy: PTA Sig:Take 60 mg by mouth daily      60 mg Oral DAILY 24 3652                 Contrast Orders - past 72 hours (72h ago, onward)      None            Interpretation of levels and current regimen:  Vancomycin level is reflective of -600  Has serum creatinine changed  greater than 50% in last 72 hours: No  Renal Function: Stable    Plan:  Continue Current Dose  Vancomycin monitoring method: AUC  Vancomycin therapeutic monitoring goal: 400-600 mg*h/L  Pharmacy will check vancomycin levels as appropriate in 1-3 Days.  Serum creatinine levels will be ordered daily for the first week of therapy and at least twice weekly for subsequent weeks.    Angela Leo RPH

## 2024-11-30 NOTE — PLAN OF CARE
Pt A&O X4, calm and cooperative. VSS on RA. No TELE. No protocols. Pain, being managed with scheduled analgesics.PICC is patent and SL. Wound vac at -125 continuous with minimum output. Tolerating oral intake and voiding appropriately. Last BM was today. Pt is resting in bed.     Mel Alexander RN   Problem: Pain Acute  Goal: Optimal Pain Control and Function  Outcome: Progressing  Intervention: Optimize Psychosocial Wellbeing  Recent Flowsheet Documentation  Taken 11/29/2024 1721 by Mel Alexander RN  Diversional Activities:   reading   television  Intervention: Prevent or Manage Pain  Recent Flowsheet Documentation  Taken 11/29/2024 1721 by Mel Alexander RN  Sensory Stimulation Regulation:   care clustered   lighting decreased   television on  Medication Review/Management: medications reviewed     Problem: Infection  Goal: Absence of Infection Signs and Symptoms  Outcome: Progressing  Intervention: Prevent or Manage Infection  Recent Flowsheet Documentation  Taken 11/29/2024 1721 by Mel Alexander RN  Infection Management: aseptic technique maintained  Isolation Precautions: contact precautions maintained     Problem: Skin Injury Risk Increased  Goal: Skin Health and Integrity  Outcome: Progressing  Intervention: Plan: Nurse Driven Intervention: Moisture Management  Recent Flowsheet Documentation  Taken 11/29/2024 1721 by Mel Alexander RN  Moisture Interventions: Incontinence pad  Intervention: Plan: Nurse Driven Intervention: Friction and Shear  Recent Flowsheet Documentation  Taken 11/29/2024 1721 by Mel Alexander RN  Friction/Shear Interventions: Silicone foam sacral dressing   Goal Outcome Evaluation:

## 2024-11-30 NOTE — PLAN OF CARE
Goal Outcome Evaluation:      Plan of Care Reviewed With: patient, child  Patient is alert and oriented and able to communicate her needs to staff. She likes to write every thing that occurs during the day in her notebook. She complains of pain in her right leg behind the knee. Lidocaine ointment applied to the right knee and behind it and patient stated her leg felt better. Patient had 2 soft bowel movements today. Ceiling lift used with the assistance of 2 staff to transfer patient into the lounge chair. Patient was so happy to be out of bed, she had the CNA take a picture of her in the chair, so she could send to her family. Purewick in place and patient has had over 1000 cc of ramona colored urine. Wound Vac in place and serosangious liquid present in canister.Plans are for discharge to a TCU where they can change her Wound Vac per POC or to be able to go to a Wound Care Clinic Will continue to monitor.

## 2024-11-30 NOTE — PLAN OF CARE
Pt A&O X4, calm and cooperative. VSS on RA. On Potassium protocol 3.1 was replaced and was 3.5. BG at dinner was 252 and at HS was 256, correctly appropriately. Pain, being managed with scheduled medication with good relief. Pt up in chair for dinner. Independent to Ax1 with turns and Ax2 julio for transferring PICC is patent, CDI, and SL. Wound vac in place and draining serosanguinous liquid in canister. Tolerating oral intake and voiding appropriately with external catheter. Last BM was today. Pt resting in recliner with call light within reach for safety    Mel Alexander RN 15:00-23:30    Problem: Adult Inpatient Plan of Care  Goal: Absence of Hospital-Acquired Illness or Injury  Intervention: Identify and Manage Fall Risk  Recent Flowsheet Documentation  Taken 11/30/2024 1732 by Mel Alexander RN  Safety Promotion/Fall Prevention:   safety round/check completed   room door open   room near nurse's station  Taken 11/30/2024 1628 by Mel Alexander RN  Safety Promotion/Fall Prevention:   safety round/check completed   room door open   room near nurse's station  Taken 11/30/2024 1559 by Mel Alexander RN  Safety Promotion/Fall Prevention:   safety round/check completed   room door open   room near nurse's station  Intervention: Prevent Skin Injury  Recent Flowsheet Documentation  Taken 11/30/2024 1559 by Mel Alexander RN  Skin Protection: adhesive use limited  Intervention: Prevent Infection  Recent Flowsheet Documentation  Taken 11/30/2024 1559 by Mel Alexander RN  Infection Prevention:   cohorting utilized   personal protective equipment utilized   rest/sleep promoted   single patient room provided   hand hygiene promoted  Goal: Optimal Comfort and Wellbeing  Intervention: Provide Person-Centered Care  Recent Flowsheet Documentation  Taken 11/30/2024 1559 by Mel Alexander RN  Trust Relationship/Rapport:   care explained   questions encouraged   questions answered      Problem: Infection  Goal: Absence of Infection Signs and Symptoms  Outcome: Progressing  Intervention: Prevent or Manage Infection  Recent Flowsheet Documentation  Taken 11/30/2024 1559 by Mel Alexander RN  Isolation Precautions: contact precautions maintained   Goal Outcome Evaluation:

## 2024-11-30 NOTE — PLAN OF CARE
Problem: Pain Acute  Goal: Optimal Pain Control and Function  Outcome: Progressing     Writer had pt at 0400. Writer attempted to draw labs from PICC line but could not get blood return. Tpa given and was able to draw AM lab. Makes needs known. Call light within reach.

## 2024-12-01 LAB
GLUCOSE BLDC GLUCOMTR-MCNC: 152 MG/DL (ref 70–99)
GLUCOSE BLDC GLUCOMTR-MCNC: 189 MG/DL (ref 70–99)
GLUCOSE BLDC GLUCOMTR-MCNC: 223 MG/DL (ref 70–99)
GLUCOSE BLDC GLUCOMTR-MCNC: 280 MG/DL (ref 70–99)
GLUCOSE BLDC GLUCOMTR-MCNC: 321 MG/DL (ref 70–99)
POTASSIUM SERPL-SCNC: 3.5 MMOL/L (ref 3.4–5.3)

## 2024-12-01 PROCEDURE — 84132 ASSAY OF SERUM POTASSIUM: CPT | Performed by: FAMILY MEDICINE

## 2024-12-01 PROCEDURE — 99232 SBSQ HOSP IP/OBS MODERATE 35: CPT | Mod: GC

## 2024-12-01 PROCEDURE — 120N000004 HC R&B MS OVERFLOW

## 2024-12-01 PROCEDURE — 250N000013 HC RX MED GY IP 250 OP 250 PS 637

## 2024-12-01 PROCEDURE — 250N000011 HC RX IP 250 OP 636

## 2024-12-01 PROCEDURE — 250N000011 HC RX IP 250 OP 636: Performed by: FAMILY MEDICINE

## 2024-12-01 RX ADMIN — VANCOMYCIN HYDROCHLORIDE 1000 MG: 1 INJECTION, SOLUTION INTRAVENOUS at 10:40

## 2024-12-01 RX ADMIN — METRONIDAZOLE 500 MG: 500 TABLET, FILM COATED ORAL at 09:41

## 2024-12-01 RX ADMIN — TORSEMIDE 60 MG: 20 TABLET ORAL at 09:42

## 2024-12-01 RX ADMIN — CEFEPIME 2 G: 2 INJECTION, POWDER, FOR SOLUTION INTRAVENOUS at 09:44

## 2024-12-01 RX ADMIN — METHADONE HYDROCHLORIDE 5 MG: 5 TABLET ORAL at 09:41

## 2024-12-01 RX ADMIN — METHADONE HYDROCHLORIDE 5 MG: 5 TABLET ORAL at 16:25

## 2024-12-01 RX ADMIN — METHADONE HYDROCHLORIDE 5 MG: 5 TABLET ORAL at 21:13

## 2024-12-01 RX ADMIN — Medication 60 ML: at 09:43

## 2024-12-01 RX ADMIN — PREGABALIN 25 MG: 25 CAPSULE ORAL at 21:24

## 2024-12-01 RX ADMIN — ACETAMINOPHEN 975 MG: 325 TABLET ORAL at 21:13

## 2024-12-01 RX ADMIN — LIDOCAINE: 50 OINTMENT TOPICAL at 21:18

## 2024-12-01 RX ADMIN — METRONIDAZOLE 500 MG: 500 TABLET, FILM COATED ORAL at 21:14

## 2024-12-01 RX ADMIN — ACETAMINOPHEN 975 MG: 325 TABLET ORAL at 09:41

## 2024-12-01 RX ADMIN — PREGABALIN 25 MG: 25 CAPSULE ORAL at 09:41

## 2024-12-01 RX ADMIN — METRONIDAZOLE 500 MG: 500 TABLET, FILM COATED ORAL at 14:35

## 2024-12-01 RX ADMIN — CEFEPIME 2 G: 2 INJECTION, POWDER, FOR SOLUTION INTRAVENOUS at 00:01

## 2024-12-01 RX ADMIN — METHADONE HYDROCHLORIDE 5 MG: 5 TABLET ORAL at 12:41

## 2024-12-01 RX ADMIN — ATORVASTATIN CALCIUM 40 MG: 40 TABLET, FILM COATED ORAL at 21:13

## 2024-12-01 RX ADMIN — CEFEPIME 2 G: 2 INJECTION, POWDER, FOR SOLUTION INTRAVENOUS at 16:25

## 2024-12-01 RX ADMIN — INSULIN DEGLUDEC 28 UNITS: 100 INJECTION, SOLUTION SUBCUTANEOUS at 09:33

## 2024-12-01 RX ADMIN — APIXABAN 5 MG: 5 TABLET, FILM COATED ORAL at 21:14

## 2024-12-01 RX ADMIN — APIXABAN 5 MG: 5 TABLET, FILM COATED ORAL at 09:41

## 2024-12-01 RX ADMIN — LIDOCAINE: 50 OINTMENT TOPICAL at 14:36

## 2024-12-01 RX ADMIN — LIDOCAINE: 50 OINTMENT TOPICAL at 09:52

## 2024-12-01 RX ADMIN — ACETAMINOPHEN 975 MG: 325 TABLET ORAL at 14:35

## 2024-12-01 ASSESSMENT — ACTIVITIES OF DAILY LIVING (ADL)
ADLS_ACUITY_SCORE: 65
ADLS_ACUITY_SCORE: 71
ADLS_ACUITY_SCORE: 66
ADLS_ACUITY_SCORE: 71
ADLS_ACUITY_SCORE: 65
ADLS_ACUITY_SCORE: 71
ADLS_ACUITY_SCORE: 66
ADLS_ACUITY_SCORE: 71
ADLS_ACUITY_SCORE: 66
ADLS_ACUITY_SCORE: 71
ADLS_ACUITY_SCORE: 66
ADLS_ACUITY_SCORE: 71
ADLS_ACUITY_SCORE: 65
ADLS_ACUITY_SCORE: 66
ADLS_ACUITY_SCORE: 71
ADLS_ACUITY_SCORE: 65
ADLS_ACUITY_SCORE: 71
ADLS_ACUITY_SCORE: 65
ADLS_ACUITY_SCORE: 65

## 2024-12-01 NOTE — PROGRESS NOTES
[facility]    Progress Note - Hospitalist Service       Date of Admission:  11/25/2024    Assessment & Plan   Linda Loredo is a 79 year old female who has a history of poorly controlled DM2, HFpEF, paroxysmal atrial fibrillation, HTN, HLD, chronic lymphedema and venous stasis ulcers, and is admitted for pain management and complex wound care.       Venous stasis ulcer  Follows with [facility] wound care, pain is much worse now.  Ulcer of RLE has been present for 15 months (see photo of right calf dated 9/25/2023).  An excisional debridement of the area was performed in July 2024.  Low suspicion for acute infection given patient is on broad-spectrum antibiotic regimen and is afebrile with barely elevated WBCs, normal range procalcitonin and CRP, and absence of purulent or foul-smelling exudates. Patient remains in exquisite pain particularly related to dressing changes. Likely discharge to TCU and then go to wound care for outpatient management and dressing changes. Will work with CM to get this set up.   Orthopedic surgery consult for second opinion, appreciate expertise  No surgical intervention planned this admission, team has signed off   Vascular surgery and WOC consult, assistance and recs appreciated  No surgical intervention planned this admission, vascular surgery team has signed off   WOC providing excellent cares with plan to develop formal wound care plan on 12/2  Patient considering return to prior TCU with new care recommendations vs pursuing a different facility   Of note, prior TCU has no access to hydromorphone on discharge, will need to alter pain regimen prior to discharge   Pain management team consulted, appreciate recs  Acetaminophen scheduled 975 mg 3 times daily  PTA methadone 5 mg 4 times daily  PTA pregabalin 25 mg twice daily; may optimize to 3 times daily if sedation allows  0.2 mg IV Dilaudid every 2 hours as needed for breakthrough pain,  "reserved for dressing changes  Docusate, Senna, Miralax for constipation prophylaxis     RLEMILY CRUZ 11/14/24  Seen by vascular surgery in clinic morning day of admission and was assessed to be doing well.  Cultures were polymicrobial and patient has documented history of ESBL and MRSA.  Continue PTA vancomycin, cefepime, metronidazole until stump closure per ID  Routine cares per Vascular, WOC instructions     Type 2 diabetes mellitus  A1c 8.4%.   Very long history of above goal A1c. Sees [facility] endocrinology.  Previously was taking Ozempic, but now only takes insulin. Will continue same insulin regimen has previous admissions.  Tresiba 28 units every morning  Medium sliding scale insulin      Chronic stable problems  Paroxysmal atrial fibrillation: Continue PTA apixaban  Anemia of chronic disease: Noted  Hyperlipidemia: Continue PTA atorvastatin  Lymphedema, HFpEF: Continue PTA torsemide  CKD 3: Noted, trend creatinine           Diet: Regular Diet Adult  Snacks/Supplements Adult: Glucerna; With Meals  Snacks/Supplements Adult: Expedite Bottle; With Meals    DVT Prophylaxis: DOAC  Braga Catheter: Not present  Fluids: PO  Lines: PRESENT      PICC 11/18/24 Single Lumen Right Basilic Antibiotic-Site Assessment: WDL      Cardiac Monitoring: None  Code Status: Full Code      Clinically Significant Risk Factors        # Hypokalemia: Lowest K = 3.1 mmol/L in last 2 days, will replace as needed            # Hypertension: Noted on problem list           # DMII: A1C = 8.4 % (Ref range: <5.7 %) within past 6 months   # Obesity: Estimated body mass index is 33.8 kg/m  as calculated from the following:    Height as of 11/13/24: 1.676 m (5' 6\").    Weight as of this encounter: 95 kg (209 lb 7 oz).        # Financial/Environmental Concerns: none         Social Drivers of Health    [facility] [employer] [facility], [employer] [facility]    Social Connections         Disposition Plan "     Medically Ready for Discharge: Anticipated in 2-4 Days         The patient's care was discussed with the Attending Physician, Dr. Mariana Tran .    Curtis Morin MD  Hospitalist Service  [facility]  Securely message with [employer] Share Your Passion! (more info)  Text page via [facility] Paging/Directory   ______________________________________________________________________    Interval History   No acute overnight events. Patient having better pain control with the wound vac on. Discussed options today about where to go for dressing changes and what leaving the hospital looks like. Patient would prefer to go to a wound clinic for outpatient management of her wounds and dressing changes while she is at the TCU.     Physical Exam   Vital Signs: Temp: 97.6  F (36.4  C) Temp src: Oral BP: 117/59 Pulse: 76   Resp: 18 SpO2: 100 % O2 Device: None (Room air)    Weight: 209 lbs 6.99 oz    Constitutional: awake, alert, cooperative, no apparent distress, and appears stated age  ENT: Normocephalic, without obvious abnormality, atraumatic, oral pharynx with moist mucous membranes, tonsils without erythema or exudates, gums normal and poor dentition.  Respiratory: No increased work of breathing, good air exchange, clear to auscultation bilaterally, no crackles or wheezing  Cardiovascular: Normal apical impulse, regular rate and rhythm, normal S1 and S2, no S3 or S4, and no murmur noted  GI: No scars, normal bowel sounds, soft, non-distended, non-tender, no masses palpated, no hepatosplenomegally  Musculoskeletal: Left BKA. Right BKA with posterolateral venous stasis ulcer, anterior superficial skin necrosis, and open distal wound. Patient is here with wound vac in place.     Data     I have personally reviewed the following data over the past 24 hrs:    5.8  \   9.0 (L)   / 188     141 101 48.7 (H) /  252 (H)   3.1 (L) 31 (H) 0.82 \       Imaging results reviewed over the past 24 hrs:   No results found for this or  any previous visit (from the past 24 hours).

## 2024-12-01 NOTE — PROGRESS NOTES
Care Management Follow Up    Length of Stay (days): 6    Expected Discharge Date: 12/02/2024     Concerns to be Addressed:       Patient plan of care discussed at interdisciplinary rounds: Yes    Anticipated Discharge Disposition: Transitional Care              Anticipated Discharge Services:    Anticipated Discharge DME: None    Patient/family educated on Medicare website which has current facility and service quality ratings: no  Education Provided on the Discharge Plan:    Patient/Family in Agreement with the Plan: yes    Referrals Placed by CM/SW: Post Acute Facilities  Private pay costs discussed: Not applicable    Handoff Completed: No, handoff not indicated or clinically appropriate    Additional Information:  Met with pt to talk about the discharge plan. Pt is to return to [facility] TCU and go to the clinic to have the wound vac dressing changed every 5 days. Pt is happy with and agreeable to the plan    Next Steps: Schedule appt at wound clinic.     Jayme Zazueta RN

## 2024-12-01 NOTE — PROGRESS NOTES
[facility]    Progress Note - Hospitalist Service       Date of Admission:  11/25/2024    Assessment & Plan   Linda Loredo is a 79 year old female who has a history of poorly controlled DM2, HFpEF, paroxysmal atrial fibrillation, HTN, HLD, chronic lymphedema and venous stasis ulcers, and is admitted for pain management and complex wound care.     Dispo Plan  The patient would like a TCU closer to her home in [place] and to see if there is a wound care clinic that could manage her wound VAC changes every 5 days in [place].  She was primarily hospitalized for pain related to this unhealing ulcer.  We now have her pain under control on her current pain regimen and things have been going well with the wound VAC.  I think her best course of action would be TCU closer to home setting up with a wound care clinic near her home.  This was unable to get done over the weekend for these wound care clinics would be closed over the weekend.    Venous stasis ulcer  Follows with [facility] wound care, pain is much worse now.  Ulcer of RLE has been present for 15 months (see photo of right calf dated 9/25/2023).  An excisional debridement of the area was performed in July 2024.  Low suspicion for acute infection given patient is on broad-spectrum antibiotic regimen and is afebrile with barely elevated WBCs, normal range procalcitonin and CRP, and absence of purulent or foul-smelling exudates. Patient remains in exquisite pain particularly related to dressing changes. Likely discharge to TCU and then go to wound care for outpatient management and dressing changes. Will work with CM to get this set up.   Orthopedic surgery consult for second opinion, they won't do the surgery either.  No surgical intervention planned this admission, team has signed off   Vascular surgery and WOC consult, assistance and recs appreciated  No surgical intervention planned this admission,  vascular surgery team has signed off   WOC providing excellent cares with plan to develop formal wound care plan on 12/2  Patient considering return to prior TCU with new care recommendations vs pursuing a different facility   Of note, prior TCU has no access to hydromorphone on discharge, will need to alter pain regimen prior to discharge   Pain management team consulted, appreciate recs  Acetaminophen scheduled 975 mg 3 times daily  PTA methadone 5 mg 4 times daily  PTA pregabalin 25 mg twice daily; may optimize to 3 times daily if sedation allows  0.2 mg IV Dilaudid every 2 hours as needed for breakthrough pain, reserved for dressing changes  Docusate, Senna, Miralax for constipation prophylaxis     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  Cultures were polymicrobial and patient has documented history of ESBL and MRSA.  Continue PTA vancomycin, cefepime, metronidazole until stump closure per ID  Routine cares per Vascular, WOC instructions     Type 2 diabetes mellitus  A1c 8.4%.   Very long history of above goal A1c. Sees [facility] endocrinology.  Previously was taking Ozempic, but now only takes insulin. Will continue same insulin regimen has previous admissions.  Tresiba 28 units every morning  Medium sliding scale insulin      Chronic stable problems  Paroxysmal atrial fibrillation: Continue PTA apixaban  Anemia of chronic disease: Noted  Hyperlipidemia: Continue PTA atorvastatin  Lymphedema, HFpEF: Continue PTA torsemide  CKD 3: Noted, trend creatinine           Diet: Regular Diet Adult  Snacks/Supplements Adult: Glucerna; With Meals  Snacks/Supplements Adult: Expedite Bottle; With Meals    DVT Prophylaxis: DOAC  Braga Catheter: Not present  Fluids: PO  Lines: PRESENT      PICC 11/18/24 Single Lumen Right Basilic Antibiotic-Site Assessment: WDL      Cardiac Monitoring: None  Code Status: Full Code      Clinically Significant Risk Factors     "    # Hypokalemia: Lowest K = 3.1 mmol/L in last 2 days, will replace as needed            # Hypertension: Noted on problem list           # DMII: A1C = 8.4 % (Ref range: <5.7 %) within past 6 months   # Obesity: Estimated body mass index is 33.8 kg/m  as calculated from the following:    Height as of 11/13/24: 1.676 m (5' 6\").    Weight as of this encounter: 95 kg (209 lb 7 oz).        # Financial/Environmental Concerns: none         Social Drivers of Health    Received from [employer]    Social Connections         Disposition Plan     Medically Ready for Discharge: Anticipated in 2-4 Days         The patient's care was discussed with the Attending Physician, Dr. Mariana Tran .    Curtis Morin MD  Hospitalist Service  [facility]  Securely message with [employer] (more info)  Text page via [employer] Paging/Directory   ______________________________________________________________________    Interval History   No acute overnight events. Patient having better pain control with the wound vac on. Discussed options today about where to go for dressing changes and what leaving the hospital looks like. Patient would prefer to go to a wound clinic for outpatient management of her wounds and dressing changes while she is at the TCU.     Physical Exam   Vital Signs: Temp: 97.8  F (36.6  C) Temp src: Oral BP: 125/66 Pulse: 74   Resp: 18 SpO2: 97 % O2 Device: None (Room air)    Weight: 209 lbs 6.99 oz    Constitutional: awake, alert, cooperative, no apparent distress, and appears stated age  ENT: Normocephalic, without obvious abnormality, atraumatic, oral pharynx with moist mucous membranes, tonsils without erythema or exudates, gums normal and poor dentition.  Respiratory: No increased work of breathing, good air exchange, clear to auscultation bilaterally, no crackles or wheezing  Cardiovascular: Normal apical " impulse, regular rate and rhythm, normal S1 and S2, no S3 or S4, and no murmur noted  GI: No scars, normal bowel sounds, soft, non-distended, non-tender, no masses palpated, no hepatosplenomegally  Musculoskeletal: Left BKA. Right BKA with posterolateral venous stasis ulcer, anterior superficial skin necrosis, and open distal wound. Patient is here with wound vac in place.     Data     I have personally reviewed the following data over the past 24 hrs:    N/A  \   N/A   / N/A     N/A N/A N/A /  280 (H)   3.5 N/A N/A \       Imaging results reviewed over the past 24 hrs:   No results found for this or any previous visit (from the past 24 hours).

## 2024-12-01 NOTE — PLAN OF CARE
Problem: Adult Inpatient Plan of Care  Goal: Absence of Hospital-Acquired Illness or Injury  Intervention: Identify and Manage Fall Risk  Recent Flowsheet Documentation  Taken 12/1/2024 0000 by Lilliana Blackman RN  Safety Promotion/Fall Prevention:   safety round/check completed   room door open   room near nurse's station     Problem: Adult Inpatient Plan of Care  Goal: Absence of Hospital-Acquired Illness or Injury  Intervention: Prevent Skin Injury  Recent Flowsheet Documentation  Taken 12/1/2024 0000 by Lilliana Blackman RN  Skin Protection: adhesive use limited   Goal Outcome Evaluation:      Plan of Care Reviewed With: patient    Overall Patient Progress: improvingOverall Patient Progress: improving  Pt alert and oriented, vss on room air, can voice needs, pain is mild, she is a hoya pt, has a wound vac and a purewick, has a Picc line  which is saline locked, slept in the chair but encouraged to shift weight every 2 hours, bottom  checked. redish and blanchable, awaiting TCU

## 2024-12-02 LAB
GLUCOSE BLDC GLUCOMTR-MCNC: 103 MG/DL (ref 70–99)
GLUCOSE BLDC GLUCOMTR-MCNC: 144 MG/DL (ref 70–99)
GLUCOSE BLDC GLUCOMTR-MCNC: 222 MG/DL (ref 70–99)
GLUCOSE BLDC GLUCOMTR-MCNC: 226 MG/DL (ref 70–99)
POTASSIUM SERPL-SCNC: 3.4 MMOL/L (ref 3.4–5.3)
POTASSIUM SERPL-SCNC: 3.8 MMOL/L (ref 3.4–5.3)

## 2024-12-02 PROCEDURE — 250N000011 HC RX IP 250 OP 636: Performed by: STUDENT IN AN ORGANIZED HEALTH CARE EDUCATION/TRAINING PROGRAM

## 2024-12-02 PROCEDURE — 250N000013 HC RX MED GY IP 250 OP 250 PS 637: Performed by: PAIN MEDICINE

## 2024-12-02 PROCEDURE — 99233 SBSQ HOSP IP/OBS HIGH 50: CPT | Mod: GC

## 2024-12-02 PROCEDURE — 99232 SBSQ HOSP IP/OBS MODERATE 35: CPT | Performed by: PAIN MEDICINE

## 2024-12-02 PROCEDURE — 250N000011 HC RX IP 250 OP 636

## 2024-12-02 PROCEDURE — 250N000011 HC RX IP 250 OP 636: Performed by: FAMILY MEDICINE

## 2024-12-02 PROCEDURE — 84132 ASSAY OF SERUM POTASSIUM: CPT | Performed by: FAMILY MEDICINE

## 2024-12-02 PROCEDURE — 250N000013 HC RX MED GY IP 250 OP 250 PS 637: Performed by: FAMILY MEDICINE

## 2024-12-02 PROCEDURE — 120N000004 HC R&B MS OVERFLOW

## 2024-12-02 PROCEDURE — 250N000013 HC RX MED GY IP 250 OP 250 PS 637

## 2024-12-02 RX ORDER — SENNOSIDES 8.6 MG
1 TABLET ORAL AT BEDTIME
Status: DISCONTINUED | OUTPATIENT
Start: 2024-12-02 | End: 2024-12-04 | Stop reason: HOSPADM

## 2024-12-02 RX ORDER — HYDROMORPHONE HYDROCHLORIDE 1 MG/ML
0.3 INJECTION, SOLUTION INTRAMUSCULAR; INTRAVENOUS; SUBCUTANEOUS ONCE
Status: COMPLETED | OUTPATIENT
Start: 2024-12-02 | End: 2024-12-02

## 2024-12-02 RX ORDER — POTASSIUM CHLORIDE 1500 MG/1
40 TABLET, EXTENDED RELEASE ORAL ONCE
Status: COMPLETED | OUTPATIENT
Start: 2024-12-02 | End: 2024-12-02

## 2024-12-02 RX ADMIN — INSULIN DEGLUDEC 28 UNITS: 100 INJECTION, SOLUTION SUBCUTANEOUS at 09:40

## 2024-12-02 RX ADMIN — ACETAMINOPHEN 975 MG: 325 TABLET ORAL at 09:35

## 2024-12-02 RX ADMIN — Medication 60 ML: at 10:06

## 2024-12-02 RX ADMIN — TORSEMIDE 60 MG: 20 TABLET ORAL at 09:35

## 2024-12-02 RX ADMIN — CEFEPIME 2 G: 2 INJECTION, POWDER, FOR SOLUTION INTRAVENOUS at 18:12

## 2024-12-02 RX ADMIN — HYDROMORPHONE HYDROCHLORIDE 0.3 MG: 1 INJECTION, SOLUTION INTRAMUSCULAR; INTRAVENOUS; SUBCUTANEOUS at 11:01

## 2024-12-02 RX ADMIN — APIXABAN 5 MG: 5 TABLET, FILM COATED ORAL at 09:35

## 2024-12-02 RX ADMIN — METRONIDAZOLE 500 MG: 500 TABLET, FILM COATED ORAL at 09:35

## 2024-12-02 RX ADMIN — CEFEPIME 2 G: 2 INJECTION, POWDER, FOR SOLUTION INTRAVENOUS at 09:37

## 2024-12-02 RX ADMIN — ATORVASTATIN CALCIUM 40 MG: 40 TABLET, FILM COATED ORAL at 20:42

## 2024-12-02 RX ADMIN — LIDOCAINE: 50 OINTMENT TOPICAL at 20:44

## 2024-12-02 RX ADMIN — LIDOCAINE: 50 OINTMENT TOPICAL at 09:39

## 2024-12-02 RX ADMIN — ACETAMINOPHEN 975 MG: 325 TABLET ORAL at 20:41

## 2024-12-02 RX ADMIN — METHADONE HYDROCHLORIDE 5 MG: 5 TABLET ORAL at 12:08

## 2024-12-02 RX ADMIN — CEFEPIME 2 G: 2 INJECTION, POWDER, FOR SOLUTION INTRAVENOUS at 00:02

## 2024-12-02 RX ADMIN — METRONIDAZOLE 500 MG: 500 TABLET, FILM COATED ORAL at 13:09

## 2024-12-02 RX ADMIN — LIDOCAINE: 50 OINTMENT TOPICAL at 13:09

## 2024-12-02 RX ADMIN — VANCOMYCIN HYDROCHLORIDE 1000 MG: 1 INJECTION, SOLUTION INTRAVENOUS at 10:07

## 2024-12-02 RX ADMIN — METRONIDAZOLE 500 MG: 500 TABLET, FILM COATED ORAL at 20:42

## 2024-12-02 RX ADMIN — METHADONE HYDROCHLORIDE 5 MG: 5 TABLET ORAL at 18:12

## 2024-12-02 RX ADMIN — HYDROMORPHONE HYDROCHLORIDE 4 MG: 2 TABLET ORAL at 10:09

## 2024-12-02 RX ADMIN — METHADONE HYDROCHLORIDE 5 MG: 5 TABLET ORAL at 09:35

## 2024-12-02 RX ADMIN — PREGABALIN 25 MG: 25 CAPSULE ORAL at 20:41

## 2024-12-02 RX ADMIN — POTASSIUM CHLORIDE 40 MEQ: 1500 TABLET, EXTENDED RELEASE ORAL at 09:35

## 2024-12-02 RX ADMIN — ACETAMINOPHEN 975 MG: 325 TABLET ORAL at 13:09

## 2024-12-02 RX ADMIN — PREGABALIN 25 MG: 25 CAPSULE ORAL at 09:35

## 2024-12-02 RX ADMIN — APIXABAN 5 MG: 5 TABLET, FILM COATED ORAL at 20:42

## 2024-12-02 RX ADMIN — METHADONE HYDROCHLORIDE 5 MG: 5 TABLET ORAL at 22:13

## 2024-12-02 ASSESSMENT — ACTIVITIES OF DAILY LIVING (ADL)
ADLS_ACUITY_SCORE: 66
ADLS_ACUITY_SCORE: 65
ADLS_ACUITY_SCORE: 66
ADLS_ACUITY_SCORE: 65
ADLS_ACUITY_SCORE: 65
ADLS_ACUITY_SCORE: 66
ADLS_ACUITY_SCORE: 65
ADLS_ACUITY_SCORE: 66
ADLS_ACUITY_SCORE: 65
ADLS_ACUITY_SCORE: 66
ADLS_ACUITY_SCORE: 65
ADLS_ACUITY_SCORE: 66
ADLS_ACUITY_SCORE: 65
ADLS_ACUITY_SCORE: 65
ADLS_ACUITY_SCORE: 66

## 2024-12-02 NOTE — PLAN OF CARE
Goal Outcome Evaluation:      Plan of Care Reviewed With: patient             I Illness Severity: Stable    PPatient Summary:          Dyspnea improved and O2 sats >88% at RA or at prior home O2 therapy level:  Pat remains on room air     Last Bowel Movement: 12/1/2024    Shift Summary: Pat tolerated transfer with the Valentín lift well. Pain managed with scheduled medications. Wound vac remains intact.      AActions:    Diagnostic testing and/or procedures completed, and results are available for discharge:  Met    SSituational Awareness:      Anticipated post discharge treatment plan formulated and the following needs have been identified:   Patient will need to have dressing changes arrange at a wound care clinic and she will discharge to the TCU she came from.     SSynthesis:     Was bedside rounding completed on your shift? Yes     What team members present during bedside rounds?  RN

## 2024-12-02 NOTE — PROGRESS NOTES
"[facility] ACUTE INPATIENT PAIN SERVICE    [facility], [facility], [facility], [facility], [place]   PAIN follow up      Assessment/Plan:  Linda Loredo is a 79 year old female who was admitted on 11/25/2024.  I was asked to see the patient for wound pain. Admitted for poorly controlled pain with dressing changes (gauze stuck on wound). History of CAD, obesity, BKA, DM2, obesity.    shows methadone ongoing. Describes pain as 0/10.  In the last 24 hours Pat utilized 20mg methadone (has not required PRN dilaudid since 11/28).    PLAN:  Chronic LE wound pain, in the setting of opioid tolerance.   Multimodal Medication Therapy:   Adjuvants: continue Lyrica 25mg BID - con optimize to TID if sedation allows for it  Opioids: Methadone 5mg QID- Qtc was 390 recently   Dilaudid PO 2-4 mg q 3 h PRN   IV dilaudid discontinued   Non-medication interventions- Ice   Constipation Prophylaxis- change senna to HS only  Follow up /Discharge Recommendations - We recommend prescribing the following at the time of discharge:  follow up with Capri Cabrera NP           Subjective:    No pain. Reviewed anticipatory pain. Distraction. Pt was worried about future dressing changes. As in the past she was denied pain medications at her facility. Somewhat tangential.  Reports \"12 BMS\" the \"other day\" and confirms 2 BMs on Sunday.         History   Drug Use No         Tobacco Use      Smoking status: Never      Smokeless tobacco: Never        Objective:  Vital signs in last 24 hours:  B/P: 128/59, T: 97.8, P: 73, R: 16   Blood pressure 128/59, pulse 73, temperature 97.8  F (36.6  C), temperature source Oral, resp. rate 16, weight 95 kg (209 lb 7 oz), SpO2 95%.        Review of Systems:   As per subjective, all others negative.    Physical Exam    General: in no apparent distress and non-toxic    HEENT: Head normocephalic atraumatic, oral mucosa moist. Sclerae anicteric  CV: Regular rhythm, normal rate, no murmurs  Resp: No " wheezes, no rales or rhonchi, no focal consolidations  GI:  reports 12 BMS on Saturday   Skin: bilateral amputations and vac in place           Imaging:  Personally Reviewed.    Results for orders placed or performed during the hospital encounter of 11/25/24   US Lower Extremity Venous Duplex Right    Impression    IMPRESSION:  1.  No deep venous thrombosis in the right lower extremity.        Lab Results:  Personally Reviewed.   Last Comprehensive Metabolic Panel:  Sodium   Date Value Ref Range Status   11/30/2024 141 135 - 145 mmol/L Final   02/22/2008 140 133 - 144 mmol/L Final     Potassium   Date Value Ref Range Status   12/02/2024 3.4 3.4 - 5.3 mmol/L Final   06/28/2022 4.3 3.5 - 5.0 mmol/L Final   02/22/2008 4.5 3.4 - 5.3 mmol/L Final     Chloride   Date Value Ref Range Status   11/30/2024 101 98 - 107 mmol/L Final   06/28/2022 97 (L) 98 - 107 mmol/L Final   02/22/2008 100 94 - 109 mmol/L Final     Carbon Dioxide   Date Value Ref Range Status   02/22/2008 28 20 - 32 mmol/L Final     Carbon Dioxide (CO2)   Date Value Ref Range Status   11/30/2024 31 (H) 22 - 29 mmol/L Final   06/28/2022 29 22 - 31 mmol/L Final     Anion Gap   Date Value Ref Range Status   11/30/2024 9 7 - 15 mmol/L Final   06/28/2022 13 5 - 18 mmol/L Final   02/22/2008 11 6 - 17 mmol/L Final     Glucose   Date Value Ref Range Status   06/28/2022 73 70 - 125 mg/dL Final   02/22/2008 298 (H) 60 - 99 mg/dL Final     GLUCOSE BY METER POCT   Date Value Ref Range Status   12/01/2024 321 (H) 70 - 99 mg/dL Final     Urea Nitrogen   Date Value Ref Range Status   11/30/2024 48.7 (H) 8.0 - 23.0 mg/dL Final   06/28/2022 42 (H) 8 - 28 mg/dL Final   02/22/2008 15 7 - 30 mg/dL Final     Creatinine   Date Value Ref Range Status   11/30/2024 0.82 0.51 - 0.95 mg/dL Final   02/22/2008 0.82 0.60 - 1.30 mg/dL Final     GFR Estimate   Date Value Ref Range Status   11/30/2024 72 >60 mL/min/1.73m2 Final     Comment:     eGFR calculated using 2021 CKD-EPI equation.  "  02/22/2008 75 >60 mL/min/1.7m2 Final     Calcium   Date Value Ref Range Status   11/30/2024 9.6 8.8 - 10.4 mg/dL Final     Comment:     Reference intervals for this test were updated on 7/16/2024 to reflect our healthy population more accurately. There may be differences in the flagging of prior results with similar values performed with this method. Those prior results can be interpreted in the context of the updated reference intervals.   02/22/2008 10.4 8.5 - 10.4 mg/dL Final        UA: No results found for: \"UAMP\", \"UBARB\", \"BENZODIAZEUR\", \"UCANN\", \"UCOC\", \"OPIT\", \"UPCP\"           Please see A&P for additional details of medical decision making.  MANAGEMENT DISCUSSED with the following over the past 24 hours: patient, MD, call to [facility]   NOTE(S)/MEDICAL RECORDS REVIEWED over the past 24 hours:  medicine and nursing   Tests personally interpreted in the past 24 hours:  - ultrasound showing no DVT  Tests REVIEWED in the past 24 hours:  - CrtCl  SUPPLEMENTAL HISTORY, in addition to the patient's history, over the past 24 hours obtained from:   - MD  Medical complexity over the past 24 hours:  -------------------------- MODERATE RISK FOR MORBIDITY --------------------------------------------------  - Prescription DRUG MANAGEMENT performed           Tasia ROSA, CNS, CNP  Acute Care Pain Management  Team  Hours of pain coverage Mon-Fri 8-1600  After hours contact the primary team  [facility] (RASHID, TERAs, SD, RH)   Page via [facility] web console -Click for [employer]asc[employer]            "

## 2024-12-02 NOTE — PROGRESS NOTES
"CLINICAL NUTRITION SERVICES - ASSESSMENT NOTE     Nutrition Prescription    RECOMMENDATIONS FOR MDs/PROVIDERS TO ORDER:  none    Malnutrition Status:    Pt does not meet 2 criteria     Recommendations already ordered by Registered Dietitian (RD):  Add consistent carbohydrate restrictions due to elevated sugars to promote healing  Continue with glucerna and Expedite    Future/Additional Recommendations:  Will monitor progress towards goals     REASON FOR ASSESSMENT  Linda Loredo is a/an 79 year old female assessed by the dietitian for LOS    Pertinent Medical Admission/History: venous stasis  ulcer, RLE BKA on 11/14/27 with wound vac, DM, CKD3, lymphedema, hx of left BKA    NUTRITION HISTORY  Allergies: NKFA  Pt resides in [facility] care, plan to discharge to TCU  Pt states she likes the Glucerna in strawberry and is drinking it 3 times a day. She doesn't care for the Expedite but she is still drinking it. She is trying to focus on getting in more protein. We discussed monitoring her carbohydrates closer to promote healing. She is open to a diabetic diet modification.     CURRENT NUTRITION ORDERS  Diet: Regular with Glucerna tid and Expedite daily   Intake/Tolerance: Pt ordered 4811kcal and 183g protein on 12/1 which included supplements, only one meal documented at 100% intake     PHYSICAL FINDINGS  See malnutrition section below.  Per flowsheet:  Edema- left leg +1-+2   GI- WDL  Skin- surgical wound on R BKA site with wound vac per WOC   Per Nsg flowsheet: buttocks friction wound  Pain- denies pain   Oral- no oral concerns noted     LABS  Labs reviewed, meter glucose-223, 321    MEDICATIONS  Medications reviewed, Colace, Novolog, Tresiba, Miralax,     ANTHROPOMETRICS  Height: 5'6\"  Most Recent Weight: 95 kg (209 lb 7 oz)    IBW: 51 kg adjusted for bilateral BKA  BMI: Obesity Grade I BMI 30-34.9  Weight History:   Wt Readings from Last 20 Encounters:   11/30/24 95 kg (209 lb 7 oz)   11/18/24 102.9 kg (226 " lb 13.7 oz)   07/28/24 104.3 kg (230 lb)   07/24/24 104.3 kg (230 lb)   07/23/24 104.5 kg (230 lb 6.4 oz)   07/22/24 104.5 kg (230 lb 6.4 oz)   07/18/24 99.4 kg (219 lb 3.2 oz)   07/17/24 99.4 kg (219 lb 3.2 oz)   07/11/24 99.6 kg (219 lb 9.3 oz)   07/08/24 99 kg (218 lb 3.2 oz)   07/03/24 101.2 kg (223 lb)   06/24/24 102.9 kg (226 lb 12.8 oz)   06/20/24 105.2 kg (232 lb)   06/17/24 104.3 kg (230 lb)   06/12/24 111.4 kg (245 lb 8 oz)   06/10/24 115.2 kg (253 lb 15.5 oz)   04/01/24 104.4 kg (230 lb 3.2 oz)   03/29/24 105.3 kg (232 lb 3.2 oz)   03/22/24 104.1 kg (229 lb 9.6 oz)   03/18/24 103.3 kg (227 lb 12.8 oz)   Pt down 17# in the past 12 days but had a BKA       ASSESSED NUTRITION NEEDS  Dosing Weight: 62 kg adjusted BW   Estimated Energy Needs: 1550-1860kcals/day (25 - 30 kcals/kg)  Justification: Obese and Wound healing  Estimated Protein Needs: 74-93 grams protein/day (1.2 - 1.5 grams of pro/kg)  Justification: Obesity guidelines and Wound healing  Estimated Fluid Needs: 4163-5087 mL/day (25 - 30 mL/kg)   Justification: Maintenance      MALNUTRITION:  % Weight Loss:  None noted, suspect wt loss related to BKA  % Intake:  No decreased intake noted  Subcutaneous Fat Loss:  None observed  Muscle Loss:  None observed  Fluid Retention:  Mild left leg +1-+2     Malnutrition Diagnosis: Patient does not meet two of the above criteria necessary for diagnosing malnutrition      NUTRITION DIAGNOSIS  Increased nutrient needs  related to new BKA as evidenced by nonhealing with wound vac    Altered nutrition related labs r/t DM aeb elevated sugars       INTERVENTIONS  Implementation  Start a moderate consistent carbohydrate diet  Continue Glucerna bid as pt likes in strawberry, please don't count carbohydrate in supplements toward meal goals   Continue Expedite daily   Education Needs- briefly discussed DM diet and protein. Pt familiar with both from her previous BKA.     Goals  Total avg nutritional intake to meet a  minimum of 25-30 kcal/kg and 1.2-1.5 g PRO/kg daily (per dosing wt 51 kg) to promote healing  Continue to drink nutrition supplements to promote healing   Blood sugar goals between      Monitoring/Evaluation  Will monitor progress towards goals including po, labs, healing

## 2024-12-02 NOTE — CONSULTS
[facility] Nurse Inpatient Assessment     Consulted for: R BKA/Calf wound    Summary: patient is well known to woc team. Daughter at bedside. Had a long talk about frustration as both daughter and patient were in tears. The pain and the waiting is weighing on both of them. Daughter and patient want another opinion on the AKA possibility vs waiting for a BKA. Alerted primary care team. Social work at bedside to discuss TCU options as well, will reach out to [facility] to see which facilities may use vac dressings and have confident wound care nursing. Daughter was given the number for the patient advocate.     Patient History (according to provider note(s):      Assessment & Plan  Linda Loredo is a 79 year old female who has a history of poorly controlled DM2, HFpEF, paroxysmal atrial fibrillation, HTN, HLD, chronic lymphedema and venous stasis ulcers, and is admitted for pain management and complex wound care.     Venous stasis ulcer  Longstanding problem for which patient follows with [facility] wound care, however patient reports pain is worse now.  The ulcer, which is on her right calf, has been present to some degree for at least 1 year and 3 months (see photo of right calf dated 9/25/2023).  An excisional debridement of the area was performed in July 2024.  Patient is admitted for pain control due to inability to tolerate dressing changes and concern for retained gauze in the wound.  Low suspicion for acute infection given patient is on broad-spectrum antibiotic regimen and is afebrile with barely elevated WBCs, normal range procalcitonin and CRP, and absence of purulent or foul-smelling exudates.  Vascular surgery and [facility] consult, assistance and recommendations appreciated  Acetaminophen scheduled 975 mg 3 times daily  PTA methadone 5 mg 4 times daily  PTA pregabalin 25 mg twice daily  PTA Dilaudid 2-4 mg every 3 hours as needed for severe pain  0.2 mg IV  Dilaudid every 2 hours as needed for breakthrough pain, reserved for dressing changes     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  Cultures were polymicrobial and patient has documented history of ESBL and MRSA.  Continue PTA vancomycin, cefepime, metronidazole until stump closure per ID  Routine cares per Vascular, WOC instructions     Type 2 diabetes mellitus  A1c 8.4%.   Very long history of above goal A1c. Sees [facility] endocrinology.  Previously was taking Ozempic, but now only takes insulin; endocrinology notes reviewed but reasoning behind this was not clear.  Possibly this was held during a previous hospital admission and inadvertently never resumed.  Patient reports being overdue for endocrine follow-up.  Sugars were reasonably well-controlled during previous admissions so we will start with same insulin regimen.  Tresiba 28 units every morning  Medium sliding scale insulin      Chronic stable problems  Paroxysmal atrial fibrillation: Continue PTA apixaban  Anemia of chronic disease: Noted  Hyperlipidemia: Continue PTA atorvastatin  Lymphedema, HFpEF: Continue PTA torsemide  CKD 3: Noted, trend creatinine    Assessment:      Negative pressure wound therapy applied to: RLE       11/15 (from previous assessment/admission)          11/27 12/2     Last photo: 12/2  Wound due to: Surgical Wound   Wound history/plan of care:    Surgical date: 11/14   Service following: Vascular  Date Negative Pressure Wound Therapy initiated: 11/15   Interventions in place: offloading  Is patient s nutritional status compromised? no   If yes, what interventions are in place? Protein supplements  Reason for initiating vac therapy? High risk of infections  Which?of?the?following?co-morbidities?apply? Diabetes  If diabetic is patient on a diabetic management program? Yes   Is osteomyelitis present in wound? no   If yes what treatments are in place? N/A      Wound base: Calf 100 % Dermis with some purpura at edges and maceration; good granulation noted, BKA 20% bone; 80 % Adipose tissue/muscle - some granulation tissue started     Palpation of the wound bed: normal       Drainage: small      Volume in cannister: 100     Last cannister change date: 12/2     Description of drainage: bloody      Measurements (length x width x depth, in cm) Calf 21  x 14  x  0.2 cm  ; BKA 10 x 9 x 1 cm - patient with additional purpura to shin with several intact blood blisters, not pressure related but likely more vascularly related     Tunneling N/A      Undermining N/A   Periwound skin: Intact       Color: pink       Temperature: warm   Odor: mild   Pain: moderate, tension to hands, feet and body, facial expression of distress, and during dressing change, shooting and constant - patient was premedicated with Oral and IV pain medication, tolerated well with no tears this vac change  Pain intervention prior to dressing change: slow and gentle cares ; IV and PO pain medications, see MAR  Treatment goal: Infection control/prevention, Protection, and Promote epidermal migration  STATUS: evolving  Supplies ordered: gathered     Number of foam pieces removed from a wound (excluding foam for bridge) : 2 GranuFoam Black and 2 urgotul    Verified this matched the number of foam pieces applied last dressing change: Yes   Number of foam pieces packed into wound (excluding foam for bridge) :  2 GranuFoam Black medium and 2 urgotul ag        Treatment Plan:     Negative pressure wound therapy plan: (see note with VAC INSTRUCTIONS)  Wound location: R BKA x2 wounds   Change Days:  Every 5 days (W-M-F-W)  by WOC RN    Supplies (including all accessories) used: 2 medium foam + several oil emulsion gauze (or mepitel or urgotul) - do not use xeroform under vac  Cleanse with Vashe prior to replacing NPWT  Suction setting: -125   Methods used: Window paned all periwound skin with vac drape prior to applying  "sponge    Staff RN to assess integrity of dressing and ensure suction is set at appropriate level every shift.   Date canister. Chart canister output every shift. Change cannister weekly and PRN if full/occluded     Remove foam dressing and replace with BID normal saline moist gauze dressing if:   -a dressing failure which cannot be repaired within 2 hours   -patient is discharging to home without a home pump   -patient is discharging to a facility outside the local area   -if a dressing is a \"Silver Foam\", remove before Radiation Therapy or MRI     The hospital VAC pump is not to be discharged with the patient. Please disconnect the patient from the machine prior to discharge.  If a home VAC pump has been delivered, connect the home cannister to dressing tubing and the cannister to the home pump, turn on home pump  If the patient is transferring to a nearby facility with a VAC, the tubing can be disconnected, clamp tubing and cover the end with a glove, then can be reconnected if within 2 hours  If transfer will be longer than 2 hours, dressing must be removed and placed with a wet to moist gauze dressing for transfer         RLE (IF VAC FAILS)  1. Soak leg with sterile water to saturate dressings and help with removal. Remove old dressings and place a new dry chux.   2. moisten 4x4 gauze with vashe and lay them out on new chux pad to the size of the wound so patient can set the calf wound down on the moist dressings. Cover bka with moist vashe gauze as well  3. cover by gently folding chux around leg  4. Change BID til vac placement. WOC will place vac wed in the afternoon.  5. Call MD for wound decline      Orders: Written    RECOMMEND PRIMARY TEAM ORDER: Vascular consult  Education provided: importance of repositioning, plan of care, and Off-loading pressure  Discussed plan of care with: Patient, Family, Nurse, and Physician  WOC nurse follow-up plan: twice weekly  Notify WOC if wound(s) deteriorate.  Nursing to " notify the Provider(s) and re-consult the [facility] Nurse if new skin concern.    DATA:     Current support surface: Standard  ED cart  Containment of urine/stool: Incontinence Protocol  BMI: Body mass index is 33.8 kg/m .   Active diet order: Orders Placed This Encounter      Moderate Consistent Carb (60 g CHO per Meal) Diet     Output: I/O last 3 completed shifts:  In: 240 [P.O.:240]  Out: 3400 [Urine:3400]     Labs:   Recent Labs   Lab 11/30/24  0422   HGB 9.0*   WBC 5.8     Pressure injury risk assessment:   Sensory Perception: 3-->slightly limited  Moisture: 3-->occasionally moist  Activity: 2-->chairfast  Mobility: 2-->very limited  Nutrition: 3-->adequate  Friction and Shear: 3-->no apparent problem  Suresh Score: 16    JOHAN LindsayN RN CWOCN  Pager no longer in use, please contact through BBE group: [facility] [employer] [facility]

## 2024-12-02 NOTE — PROGRESS NOTES
Care Management Follow Up    Length of Stay (days): 7    Expected Discharge Date: 12/02/2024     Concerns to be Addressed:       Patient plan of care discussed at interdisciplinary rounds: Yes    Anticipated Discharge Disposition: Transitional Care     Anticipated Discharge Services:    Anticipated Discharge DME: None    Patient/family educated on Medicare website which has current facility and service quality ratings: no  Education Provided on the Discharge Plan:    Patient/Family in Agreement with the Plan: yes    Referrals Placed by CM/SW: Post Acute Facilities  Private pay costs discussed: transportation costs    Discussed  Partnership in Safe Discharge Planning  document with patient/family: Yes     Handoff Completed: yes    Additional Information:  10:35 AM  CM working on setting up wound care at a wound clinic. CM in the midst of navigating with [facility] specialist regarding billing concerns. CM explained why pt is needing wound care specifically at a clinic rather then at the TCU. Specialist will get back to CM once she looks talks to her supervisor.    10:56 AM  CM confirmed with [facility] that Pt can be on PO dilaudid. There will need to be a hard script for that. TCU would like patient to return tomorrow 12/3     3:02 PM  [facility] states that they wouldn't be able to take pt until Thursday 12/5 as they want a full 48 hours on the PO dilaudid before discharge. CM also sent over WOC nurse notes over for [facility] to review. Pt cannot go to a outpatient clinic for wound care along with going to TCU. CM to follow.    JOHAN MarroquinW

## 2024-12-02 NOTE — PROGRESS NOTES
[facility]    Progress Note - Hospitalist Service       Date of Admission:  11/25/2024    Assessment & Plan   Linda Loredo is a 79 year old female who has a history of poorly controlled DM2, HFpEF, paroxysmal atrial fibrillation, HTN, HLD, chronic lymphedema and venous stasis ulcers, and is admitted for pain management and complex wound care.     Dispo Plan  The patient would like a TCU closer to her home in [place] and to see if there is a wound care clinic that could manage her wound VAC changes every 5 days in [place].  She was primarily hospitalized for pain related to this unhealing ulcer.  We now have her pain under control on her current pain regimen and things have been going well with the wound VAC.  I think her best course of action would be TCU closer to home setting up with a wound care clinic near her home.   contacted the TCU, they mentioned patient could not go to wound care while staying at the TCU and they need the patient to be on p.o. Dilaudid for more than 48 hours before discharge.   will contact the TCU for possible discharge.      Venous stasis ulcer  Follows with [facility] wound care, pain is much worse now.  Ulcer of RLE has been present for 15 months (see photo of right calf dated 9/25/2023).  An excisional debridement of the area was performed in July 2024.  Low suspicion for acute infection given patient is on broad-spectrum antibiotic regimen and is afebrile with barely elevated WBCs, normal range procalcitonin and CRP, and absence of purulent or foul-smelling exudates. Patient remains in exquisite pain particularly related to dressing changes. Likely discharge to TCU and then go to wound care for outpatient management and dressing changes. Will work with CM to get this set up.   Orthopedic surgery consult for second opinion, they won't do the surgery either.  No surgical intervention planned this  admission, team has signed off   Vascular surgery and WOC consult, assistance and recs appreciated  No surgical intervention planned this admission, vascular surgery team has signed off   WOC providing excellent cares with plan to develop formal wound care plan on 12/2  Patient considering return to prior TCU with new care recommendations vs pursuing a different facility   Of note, prior TCU has no access to hydromorphone on discharge, will need to alter pain regimen prior to discharge   Pain management team consulted, appreciate recs  Continue acetaminophen scheduled 975 mg 3 times daily  Continue PTA methadone 5 mg 4 times daily  Continue continue PTA pregabalin 25 mg twice daily; may optimize to 3 times daily if sedation allows  0.2 mg IV Dilaudid every 2 hours as needed for breakthrough pain, reserved for dressing changes  Continue docusate, Senna, Miralax for constipation prophylaxis     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  Cultures were polymicrobial and patient has documented history of ESBL and MRSA.  Continue PTA vancomycin, cefepime, metronidazole until stump closure per ID  Routine cares per Vascular, WOC instructions  Wound care team has instruction for wound dressing change for the patient.     Type 2 diabetes mellitus  A1c 8.4%.   Very long history of above goal A1c. Sees [facility] endocrinology.  Previously was taking Ozempic, but now only takes insulin. Will continue same insulin regimen has previous admissions.  Continue Tresiba 28 units every morning  Continue medium sliding scale insulin      Chronic stable problems  Paroxysmal atrial fibrillation: Continue PTA apixaban  Anemia of chronic disease: Noted  Hyperlipidemia: Continue PTA atorvastatin  Lymphedema, HFpEF: Continue PTA torsemide  CKD 3: Noted, trend creatinine           Diet: Regular Diet Adult  Snacks/Supplements Adult: Glucerna; With Meals  Snacks/Supplements  "Adult: Expedite Bottle; With Meals    DVT Prophylaxis: DOAC  Braga Catheter: Not present  Fluids: PO  Lines: PRESENT      PICC 11/18/24 Single Lumen Right Basilic Antibiotic-Site Assessment: WDL      Cardiac Monitoring: None  Code Status: Full Code      Clinically Significant Risk Factors                   # Hypertension: Noted on problem list           # DMII: A1C = 8.4 % (Ref range: <5.7 %) within past 6 months   # Obesity: Estimated body mass index is 33.8 kg/m  as calculated from the following:    Height as of 11/13/24: 1.676 m (5' 6\").    Weight as of this encounter: 95 kg (209 lb 7 oz).        # Financial/Environmental Concerns: none         Social Drivers of Health    Received from [facility], [facility]    Social Connections         Disposition Plan     Medically Ready for Discharge: Anticipated in 2-4 Days         The patient's care was discussed with the Attending Physician, Dr. Ann .    Wes Cobos MD  Hospitalist Service  [facility]  Securely message with [employer] (more info)  Text page via [employer] Paging/Directory   ______________________________________________________________________    Interval History   No acute overnight events. Patient having better pain control with the wound vac on. Patient would prefer to go to a wound clinic for outpatient management of her wounds and dressing changes while she is at the TCU.     Physical Exam   Vital Signs: Temp: 97.8  F (36.6  C) Temp src: Oral BP: 128/59 Pulse: 73   Resp: 16 SpO2: 95 % O2 Device: None (Room air)    Weight: 209 lbs 6.99 oz    Constitutional: awake, alert, cooperative, no apparent distress, and appears stated age  ENT: Normocephalic, without obvious abnormality, atraumatic, oral pharynx with moist mucous membranes, tonsils without erythema or exudates, gums normal and poor dentition.  Respiratory: No increased work of breathing, good air exchange, " clear to auscultation bilaterally, no crackles or wheezing  Cardiovascular: Normal apical impulse, regular rate and rhythm, normal S1 and S2, no S3 or S4, and no murmur noted  GI: No scars, normal bowel sounds, soft, non-distended, non-tender, no masses palpated, no hepatosplenomegally  Musculoskeletal: Left BKA. Right BKA with posterolateral venous stasis ulcer, anterior superficial skin necrosis, and open distal wound. Patient is here with wound vac in place.     Data     I have personally reviewed the following data over the past 24 hrs:    N/A  \   N/A   / N/A     N/A N/A N/A /  321 (H)   3.4 N/A N/A \       Imaging results reviewed over the past 24 hrs:   No results found for this or any previous visit (from the past 24 hours).

## 2024-12-02 NOTE — PROGRESS NOTES
WOUND VAC CHANGE INSTRUCTIONS (FOR TCU)    Step 1: Premedicate with oral pain medication 1hr before procedure, turn off wound vac to allow foam to loosen. May moisten vac foam with saline to assist in gentle removal.       Step 2: Use adhesive remover to gently remove vac dressings.            Step 3: Soak both wounds with moist vashe gauze for 5-10 minutes, pat dry intact skin.                  Step 4: Use skin prep, or barrier film around entire periwound to help protect skin. Allow to fully dry for 60 seconds.             Step 5: Cover both wounds with a nonadherent layer - Urgotul [employer] used (can use Oil emulsion gauze or mepitel, do not use xeroform). You do not need to predrape as long as the nonadherent layer covers a wider area than the foam.              Step 6: Place vac foam over area, to the size of the wound and cover with drape.               Step 7: Cut 2 holes, 1 in each dressing for the trac pads, Y site the tubing together.             Step 8: begin vac therapy at -125mmHg. Patch any leaks noted.

## 2024-12-02 NOTE — PLAN OF CARE
Problem: Infection  Goal: Absence of Infection Signs and Symptoms  Outcome: Progressing  Intervention: Prevent or Manage Infection  Recent Flowsheet Documentation  Taken 12/2/2024 0005 by José Antonio Mcwilliams RN  Isolation Precautions: contact precautions maintained     AO x4. VSS on RA. Denies pain. Woundvac patent and draining. IV Cefepime given. K protocol. No major events overnight. Makes needs known. Call light within reach.

## 2024-12-03 ENCOUNTER — HOSPITAL ENCOUNTER (INPATIENT)
Facility: HOSPITAL | Age: 79
Setting detail: SURGERY ADMIT
End: 2024-12-03
Attending: SURGERY | Admitting: SURGERY
Payer: COMMERCIAL

## 2024-12-03 LAB
CREAT SERPL-MCNC: 0.69 MG/DL (ref 0.51–0.95)
EGFRCR SERPLBLD CKD-EPI 2021: 88 ML/MIN/1.73M2
GLUCOSE BLDC GLUCOMTR-MCNC: 115 MG/DL (ref 70–99)
GLUCOSE BLDC GLUCOMTR-MCNC: 208 MG/DL (ref 70–99)
GLUCOSE BLDC GLUCOMTR-MCNC: 213 MG/DL (ref 70–99)
GLUCOSE BLDC GLUCOMTR-MCNC: 214 MG/DL (ref 70–99)
GLUCOSE BLDC GLUCOMTR-MCNC: 94 MG/DL (ref 70–99)
POTASSIUM SERPL-SCNC: 3.9 MMOL/L (ref 3.4–5.3)

## 2024-12-03 PROCEDURE — 250N000013 HC RX MED GY IP 250 OP 250 PS 637

## 2024-12-03 PROCEDURE — 250N000011 HC RX IP 250 OP 636

## 2024-12-03 PROCEDURE — 120N000004 HC R&B MS OVERFLOW

## 2024-12-03 PROCEDURE — 250N000011 HC RX IP 250 OP 636: Performed by: FAMILY MEDICINE

## 2024-12-03 PROCEDURE — 99232 SBSQ HOSP IP/OBS MODERATE 35: CPT | Performed by: PAIN MEDICINE

## 2024-12-03 PROCEDURE — 250N000009 HC RX 250: Performed by: PAIN MEDICINE

## 2024-12-03 PROCEDURE — 84132 ASSAY OF SERUM POTASSIUM: CPT | Performed by: FAMILY MEDICINE

## 2024-12-03 PROCEDURE — 82565 ASSAY OF CREATININE: CPT | Performed by: FAMILY MEDICINE

## 2024-12-03 PROCEDURE — 250N000013 HC RX MED GY IP 250 OP 250 PS 637: Performed by: PAIN MEDICINE

## 2024-12-03 PROCEDURE — 99233 SBSQ HOSP IP/OBS HIGH 50: CPT | Mod: GC

## 2024-12-03 RX ORDER — HYDROMORPHONE HYDROCHLORIDE 2 MG/1
4 TABLET ORAL EVERY 4 HOURS PRN
Qty: 30 TABLET | Refills: 0 | Status: SHIPPED | OUTPATIENT
Start: 2024-12-03 | End: 2024-12-04

## 2024-12-03 RX ORDER — METHADONE HYDROCHLORIDE 5 MG/1
5 TABLET ORAL 4 TIMES DAILY
Qty: 42 TABLET | Refills: 0 | Status: SHIPPED | OUTPATIENT
Start: 2024-12-03 | End: 2024-12-04

## 2024-12-03 RX ORDER — PREGABALIN 25 MG/1
25 CAPSULE ORAL 2 TIMES DAILY
Qty: 42 CAPSULE | Refills: 0 | Status: SHIPPED | OUTPATIENT
Start: 2024-12-03 | End: 2024-12-04

## 2024-12-03 RX ADMIN — CEFEPIME 2 G: 2 INJECTION, POWDER, FOR SOLUTION INTRAVENOUS at 16:13

## 2024-12-03 RX ADMIN — PREGABALIN 25 MG: 25 CAPSULE ORAL at 20:39

## 2024-12-03 RX ADMIN — ACETAMINOPHEN 975 MG: 325 TABLET ORAL at 08:03

## 2024-12-03 RX ADMIN — METHADONE HYDROCHLORIDE 5 MG: 5 TABLET ORAL at 11:55

## 2024-12-03 RX ADMIN — CEFEPIME 2 G: 2 INJECTION, POWDER, FOR SOLUTION INTRAVENOUS at 08:03

## 2024-12-03 RX ADMIN — LIDOCAINE: 50 OINTMENT TOPICAL at 16:09

## 2024-12-03 RX ADMIN — ACETAMINOPHEN 975 MG: 325 TABLET ORAL at 20:41

## 2024-12-03 RX ADMIN — CEFEPIME 2 G: 2 INJECTION, POWDER, FOR SOLUTION INTRAVENOUS at 00:52

## 2024-12-03 RX ADMIN — APIXABAN 5 MG: 5 TABLET, FILM COATED ORAL at 20:42

## 2024-12-03 RX ADMIN — VANCOMYCIN HYDROCHLORIDE 1000 MG: 1 INJECTION, SOLUTION INTRAVENOUS at 09:56

## 2024-12-03 RX ADMIN — PREGABALIN 25 MG: 25 CAPSULE ORAL at 08:03

## 2024-12-03 RX ADMIN — METHADONE HYDROCHLORIDE 5 MG: 5 TABLET ORAL at 08:03

## 2024-12-03 RX ADMIN — ATORVASTATIN CALCIUM 40 MG: 40 TABLET, FILM COATED ORAL at 20:42

## 2024-12-03 RX ADMIN — INSULIN DEGLUDEC 28 UNITS: 100 INJECTION, SOLUTION SUBCUTANEOUS at 08:04

## 2024-12-03 RX ADMIN — METRONIDAZOLE 500 MG: 500 TABLET, FILM COATED ORAL at 08:03

## 2024-12-03 RX ADMIN — LIDOCAINE: 50 OINTMENT TOPICAL at 20:46

## 2024-12-03 RX ADMIN — LIDOCAINE: 50 OINTMENT TOPICAL at 08:04

## 2024-12-03 RX ADMIN — TORSEMIDE 60 MG: 20 TABLET ORAL at 08:03

## 2024-12-03 RX ADMIN — METRONIDAZOLE 500 MG: 500 TABLET, FILM COATED ORAL at 20:42

## 2024-12-03 RX ADMIN — ACETAMINOPHEN 975 MG: 325 TABLET ORAL at 16:08

## 2024-12-03 RX ADMIN — Medication 60 ML: at 08:05

## 2024-12-03 RX ADMIN — HYDROMORPHONE HYDROCHLORIDE 2 MG: 2 TABLET ORAL at 01:47

## 2024-12-03 RX ADMIN — APIXABAN 5 MG: 5 TABLET, FILM COATED ORAL at 08:03

## 2024-12-03 RX ADMIN — METRONIDAZOLE 500 MG: 500 TABLET, FILM COATED ORAL at 16:09

## 2024-12-03 RX ADMIN — METHADONE HYDROCHLORIDE 5 MG: 5 TABLET ORAL at 20:42

## 2024-12-03 RX ADMIN — METHADONE HYDROCHLORIDE 5 MG: 5 TABLET ORAL at 16:09

## 2024-12-03 ASSESSMENT — ACTIVITIES OF DAILY LIVING (ADL)
ADLS_ACUITY_SCORE: 61
ADLS_ACUITY_SCORE: 62
ADLS_ACUITY_SCORE: 65
ADLS_ACUITY_SCORE: 62
ADLS_ACUITY_SCORE: 61
ADLS_ACUITY_SCORE: 62
ADLS_ACUITY_SCORE: 62
ADLS_ACUITY_SCORE: 61
ADLS_ACUITY_SCORE: 66
ADLS_ACUITY_SCORE: 61
ADLS_ACUITY_SCORE: 61
ADLS_ACUITY_SCORE: 62
ADLS_ACUITY_SCORE: 66
ADLS_ACUITY_SCORE: 61
ADLS_ACUITY_SCORE: 62
ADLS_ACUITY_SCORE: 61

## 2024-12-03 NOTE — PROGRESS NOTES
Care Management Follow Up    Length of Stay (days): 8    Expected Discharge Date: 12/04/2024     Concerns to be Addressed: discharge planning     Patient plan of care discussed at interdisciplinary rounds: Yes    Anticipated Discharge Disposition: Transitional Care      Anticipated Discharge Services:    Anticipated Discharge DME: None    Patient/family educated on Medicare website which has current facility and service quality ratings: no  Education Provided on the Discharge Plan:    Patient/Family in Agreement with the Plan: yes    Referrals Placed by CM/SW: Post Acute Facilities  Private pay costs discussed: Not applicable    Discussed  Partnership in Safe Discharge Planning  document with patient/family: Yes     Handoff Completed: No, handoff not indicated or clinically appropriate    Additional Information:    10:10 AM  [facility] TCU - CM left a voicemail with Claudette to confirm they can meet pt's needs (updated WOCN notes/instructions sent to TCU yesterday).    [facility] - Updated referral sent.  Requested TCU admissions staff to review.    Daughter Lu Shashank Leigh states [facility] bed [facility].  Update given to Lu on discharge plans.    12:40 PM  [facility] - Declined    [facility] - Per Claudette, their clinical team is still reviewing to see if they can meet pt's needs.  CM awaiting return call from Claudette to confirm TCU can meet pt's needs.    Update given to pt and daughter Lu.  Pt and Lu made aware TCU is to perform wound cares, and pt would not be going to wound care clinic for wound cares while at TCU.      Lu wants additional TCU referrals to be sent.  Pt reports she is accepting of returning to [facility] if no other TCU's can meet her needs.    Additional TCU referrals sent.    2:06 PM  [facility] TCU - Per Yesenia: Would need clarification on IV abx plan and end date.  Pt must tolerate PO meds only during wound vac change. Need  PT/OT evals to see if pt needs therapies.    [facility] TCU - Per Vickie, will review again tomorrow.    Next Steps:   PAS - not needed if return to [facility] transport    CM will continue to follow.     Juan Briggs RN

## 2024-12-03 NOTE — PROGRESS NOTES
[facility]    Progress Note - Hospitalist Service       Date of Admission:  11/25/2024    Assessment & Plan   Linda Loredo is a 79 year old female who has a history of poorly controlled DM2, HFpEF, paroxysmal atrial fibrillation, HTN, HLD, chronic lymphedema and venous stasis ulcers, and is admitted for pain management and complex wound care.     Dispo Plan  Working on transition to facility that can manage her wound VAC changes every 5 days in [place].  She was primarily hospitalized for pain related to this unhealing ulcer.  We now have her pain under control on her current pain regimen and things have been going well with the wound VAC. TCU has been located, but prefers Patient to be on oral Dilaudid only; Last IV dose to be more than 48 hours before transition.    Venous stasis ulcer  Follows with [facility] wound care, pain is much worse now.  Ulcer of RLE has been present for 15 months (see photo of right calf dated 9/25/2023).  An excisional debridement of the area was performed in July 2024.  Low suspicion for acute infection given patient is on broad-spectrum antibiotic regimen and is afebrile with barely elevated WBCs, normal range procalcitonin and CRP, and absence of purulent or foul-smelling exudates. Patient remains in exquisite pain particularly related to dressing changes. Likely discharge to TCU and then go to wound care for outpatient management and dressing changes. Will work with CM to get this set up.   Orthopedic surgery consult for second opinion, they won't do the surgery either.  No surgical intervention planned this admission, team has signed off   Vascular surgery and WOC consult, assistance and recs appreciated  No surgical intervention planned this admission, vascular surgery team has signed off   Patient considering return to prior TCU with new care recommendations vs pursuing a different facility   Of note, prior TCU has no access  to hydromorphone on discharge, will need to alter pain regimen prior to discharge   Pain management team consulted, appreciate recs  Continue acetaminophen scheduled 975 mg 3 times daily  Continue PTA methadone 5 mg 4 times daily  Continue continue PTA pregabalin 25 mg twice daily; may optimize to 3 times daily if sedation allows  0.2 mg IV Dilaudid every 2 hours as needed for breakthrough pain, reserved for dressing changes  Continue docusate, Senna, Miralax for constipation prophylaxis     RLEMILY CRUZ 11/14/24  Seen by vascular surgery in clinic morning day of admission and was assessed to be doing well.  Cultures were polymicrobial and patient has documented history of ESBL and MRSA.  Continue PTA vancomycin, cefepime, metronidazole until stump closure per ID  Routine cares per Vascular, WOC instructions  Wound care team has instruction for wound dressing change for the patient.     Type 2 diabetes mellitus  A1c 8.4%.   Very long history of above goal A1c. Sees [facility] endocrinology.  Previously was taking Ozempic, but now only takes insulin. Will continue same insulin regimen has previous admissions.  Continue Tresiba 28 units every morning  Continue medium sliding scale insulin      Chronic stable problems  Paroxysmal atrial fibrillation: Continue PTA apixaban  Anemia of chronic disease: Noted  Hyperlipidemia: Continue PTA atorvastatin  Lymphedema, HFpEF: Continue PTA torsemide  CKD 3: Noted, trend creatinine           Diet: Snacks/Supplements Adult: Glucerna; With Meals  Snacks/Supplements Adult: Expedite Bottle; With Meals  Moderate Consistent Carb (60 g CHO per Meal) Diet    DVT Prophylaxis: DOAC  Braga Catheter: Not present  Fluids: PO  Lines: PRESENT      PICC 11/18/24 Single Lumen Right Basilic Antibiotic-Site Assessment: WDL      Cardiac Monitoring: None  Code Status: Full Code      Clinically Significant Risk Factors                   # Hypertension: Noted on problem list           # DMII: A1C = 8.4 % (Ref  "range: <5.7 %) within past 6 months   # Obesity: Estimated body mass index is 33.98 kg/m  as calculated from the following:    Height as of 11/13/24: 1.676 m (5' 6\").    Weight as of this encounter: 95.5 kg (210 lb 8.6 oz).        # Financial/Environmental Concerns: none         Social Drivers of Health    Received from [facility], [facility] [facility]    Social Connections         Disposition Plan     Medically Ready for Discharge: Anticipated in 2-4 Days         The patient's care was discussed with the Attending Physician, Dr. Ann .    Wes Cobos MD  Hospitalist Service  [facility]  Securely message with [employer] (more info)  Text page via [employer] Paging/Directory   ______________________________________________________________________    Interval History   No new acute event overnight.  Patient is informed about pain medication.    Physical Exam   Vital Signs: Temp: 97.8  F (36.6  C) Temp src: Oral BP: 113/62 Pulse: 88   Resp: 18 SpO2: 100 % O2 Device: None (Room air)    Weight: 210 lbs 8.63 oz    Constitutional: awake, alert, cooperative, no apparent distress, and appears stated age  ENT: Normocephalic, without obvious abnormality, atraumatic, oral pharynx with moist mucous membranes, tonsils without erythema or exudates, gums normal and poor dentition.  Respiratory: No increased work of breathing, good air exchange, clear to auscultation bilaterally, no crackles or wheezing  Cardiovascular: Normal apical impulse, regular rate and rhythm, normal S1 and S2, no S3 or S4, and no murmur noted  GI: No scars, normal bowel sounds, soft, non-distended, non-tender, no masses palpated, no hepatosplenomegally  Musculoskeletal: Left BKA. Right BKA with posterolateral venous stasis ulcer, anterior superficial skin necrosis, and open distal wound. Patient is here with wound vac in place.     Data     I have personally reviewed the " following data over the past 24 hrs:    N/A  \   N/A   / N/A     N/A N/A N/A /  214 (H)   3.9 N/A 0.69 \       Imaging results reviewed over the past 24 hrs:   No results found for this or any previous visit (from the past 24 hours).

## 2024-12-03 NOTE — PLAN OF CARE
Goal Outcome Evaluation:    Pt reports right leg pain 3-7/10 controlled with scheduled pain medication and Dilaudid prn x 1.   Pt using purewick, leaking at times.   Right leg appears to be slightly pink above the dressing of the wound vac.  Pt alert and oriented.  Moves well in bed.  Tearful at times during the night.      Problem: Adult Inpatient Plan of Care  Goal: Absence of Hospital-Acquired Illness or Injury  Intervention: Identify and Manage Fall Risk        Problem: Adult Inpatient Plan of Care  Goal: Optimal Comfort and Wellbeing  Intervention: Monitor Pain and Promote Comfort  Recent Flowsheet Documentation  Taken 12/2/2024 2050 by Kathleen Mckeon, RN  Pain Management Interventions: medication (see MAR)     Problem: Skin Injury Risk Increased  Goal: Skin Health and Integrity

## 2024-12-03 NOTE — PROGRESS NOTES
[facility] ACUTE INPATIENT PAIN SERVICE    [facility], [facility], [facility], [facility], [place]   PAIN follow up      Assessment/Plan:  Linda Loredo is a 79 year old female who was admitted on 11/25/2024.  I was asked to see the patient for wound pain. Admitted for poorly controlled pain with dressing changes (gauze stuck on wound). History of CAD, obesity, BKA, DM2, obesity.    shows methadone ongoing. Describes pain as 0/10.   Utilized 6mg po dilaudid yesterday (along with 0.3mg IV for dressing changes)     PLAN:  Chronic LE wound pain, in the setting of opioid tolerance. Increases in pain with VAC changes. Continue on with scheduled methadone and may only need po dilaudid every 5 days (when VAC is changed).   Multimodal Medication Therapy:   Adjuvants: continue Lyrica 25mg BID -   Opioids: Methadone 5mg QID- Qtc was 390 recently   Dilaudid PO 2-4 mg q 3 h PRN   Please stop IV dilaudid - recommend PO for dressing changes.   Non-medication interventions- Ice   Constipation Prophylaxis- change senna to HS only  Follow up /Discharge Recommendations - We recommend prescribing the following at the time of discharge:  follow up with Capri Cabrera NP - scripts in the chart for TCU          Subjective:    Pt did utilize IV dilaudid for dressing change. Pain is controlled today. ON the phone with her insurance company. Wrote scripts and placed in the chart.       History   Drug Use No         Tobacco Use      Smoking status: Never      Smokeless tobacco: Never        Objective:  Vital signs in last 24 hours:  B/P: 128/59, T: 97.8, P: 73, R: 16   Blood pressure (!) 138/90, pulse (P) 76, temperature (P) 97.6  F (36.4  C), temperature source (P) Oral, resp. rate (P) 18, weight 95.5 kg (210 lb 8.6 oz), SpO2 (P) 96%.        Review of Systems:   As per subjective, all others negative.    Physical Exam    General: in no apparent distress and non-toxic    HEENT: Head normocephalic atraumatic, oral  mucosa moist. Sclerae anicteric  CV: Regular rhythm, normal rate, no murmurs  Resp: No wheezes, no rales or rhonchi, no focal consolidations  GI:  reports 12 BMS on Saturday   Skin: bilateral amputations and vac in place           Imaging:  Personally Reviewed.    Results for orders placed or performed during the hospital encounter of 11/25/24   US Lower Extremity Venous Duplex Right    Impression    IMPRESSION:  1.  No deep venous thrombosis in the right lower extremity.        Lab Results:  Personally Reviewed.   Last Comprehensive Metabolic Panel:  Sodium   Date Value Ref Range Status   11/30/2024 141 135 - 145 mmol/L Final   02/22/2008 140 133 - 144 mmol/L Final     Potassium   Date Value Ref Range Status   12/03/2024 3.9 3.4 - 5.3 mmol/L Final   06/28/2022 4.3 3.5 - 5.0 mmol/L Final   02/22/2008 4.5 3.4 - 5.3 mmol/L Final     Chloride   Date Value Ref Range Status   11/30/2024 101 98 - 107 mmol/L Final   06/28/2022 97 (L) 98 - 107 mmol/L Final   02/22/2008 100 94 - 109 mmol/L Final     Carbon Dioxide   Date Value Ref Range Status   02/22/2008 28 20 - 32 mmol/L Final     Carbon Dioxide (CO2)   Date Value Ref Range Status   11/30/2024 31 (H) 22 - 29 mmol/L Final   06/28/2022 29 22 - 31 mmol/L Final     Anion Gap   Date Value Ref Range Status   11/30/2024 9 7 - 15 mmol/L Final   06/28/2022 13 5 - 18 mmol/L Final   02/22/2008 11 6 - 17 mmol/L Final     Glucose   Date Value Ref Range Status   06/28/2022 73 70 - 125 mg/dL Final   02/22/2008 298 (H) 60 - 99 mg/dL Final     GLUCOSE BY METER POCT   Date Value Ref Range Status   12/03/2024 115 (H) 70 - 99 mg/dL Final     Urea Nitrogen   Date Value Ref Range Status   11/30/2024 48.7 (H) 8.0 - 23.0 mg/dL Final   06/28/2022 42 (H) 8 - 28 mg/dL Final   02/22/2008 15 7 - 30 mg/dL Final     Creatinine   Date Value Ref Range Status   12/03/2024 0.69 0.51 - 0.95 mg/dL Final   02/22/2008 0.82 0.60 - 1.30 mg/dL Final     GFR Estimate   Date Value Ref Range Status   12/03/2024 88  ">60 mL/min/1.73m2 Final     Comment:     eGFR calculated using 2021 CKD-EPI equation.   02/22/2008 75 >60 mL/min/1.7m2 Final     Calcium   Date Value Ref Range Status   11/30/2024 9.6 8.8 - 10.4 mg/dL Final     Comment:     Reference intervals for this test were updated on 7/16/2024 to reflect our healthy population more accurately. There may be differences in the flagging of prior results with similar values performed with this method. Those prior results can be interpreted in the context of the updated reference intervals.   02/22/2008 10.4 8.5 - 10.4 mg/dL Final        UA: No results found for: \"UAMP\", \"UBARB\", \"BENZODIAZEUR\", \"UCANN\", \"UCOC\", \"OPIT\", \"UPCP\"           Please see A&P for additional details of medical decision making.  MANAGEMENT DISCUSSED with the following over the past 24 hours: patient, MD, call to Lu   NOTE(S)/MEDICAL RECORDS REVIEWED over the past 24 hours:  medicine and nursing   Tests personally interpreted in the past 24 hours:  - ultrasound showing no DVT  Tests REVIEWED in the past 24 hours:  - CrtCl  SUPPLEMENTAL HISTORY, in addition to the patient's history, over the past 24 hours obtained from:   - MD  Medical complexity over the past 24 hours:  -------------------------- MODERATE RISK FOR MORBIDITY --------------------------------------------------  - Prescription DRUG MANAGEMENT performed           Tasia Perez APRN, CNS, CNP  Acute Care Pain Management  Team  Hours of pain coverage Mon-Fri 8-1600  After hours contact the primary team  [facility] (RASHID, Lacey, [place], RH)   Page via [employer] web console -Click for RESAAS            "

## 2024-12-04 VITALS
WEIGHT: 225.75 LBS | HEART RATE: 87 BPM | BODY MASS INDEX: 36.44 KG/M2 | SYSTOLIC BLOOD PRESSURE: 123 MMHG | TEMPERATURE: 97.8 F | RESPIRATION RATE: 18 BRPM | DIASTOLIC BLOOD PRESSURE: 79 MMHG | OXYGEN SATURATION: 98 %

## 2024-12-04 LAB
GLUCOSE BLDC GLUCOMTR-MCNC: 147 MG/DL (ref 70–99)
GLUCOSE BLDC GLUCOMTR-MCNC: 225 MG/DL (ref 70–99)
GLUCOSE BLDC GLUCOMTR-MCNC: 240 MG/DL (ref 70–99)
POTASSIUM SERPL-SCNC: 3.8 MMOL/L (ref 3.4–5.3)

## 2024-12-04 PROCEDURE — 99238 HOSP IP/OBS DSCHRG MGMT 30/<: CPT | Mod: GC

## 2024-12-04 PROCEDURE — 250N000011 HC RX IP 250 OP 636: Performed by: FAMILY MEDICINE

## 2024-12-04 PROCEDURE — 84132 ASSAY OF SERUM POTASSIUM: CPT | Performed by: FAMILY MEDICINE

## 2024-12-04 PROCEDURE — 250N000011 HC RX IP 250 OP 636

## 2024-12-04 PROCEDURE — 250N000013 HC RX MED GY IP 250 OP 250 PS 637

## 2024-12-04 RX ORDER — METHADONE HYDROCHLORIDE 5 MG/1
5 TABLET ORAL 4 TIMES DAILY
Qty: 42 TABLET | Refills: 0 | Status: SHIPPED | OUTPATIENT
Start: 2024-12-04

## 2024-12-04 RX ORDER — HYDROMORPHONE HYDROCHLORIDE 2 MG/1
4 TABLET ORAL EVERY 4 HOURS PRN
Qty: 30 TABLET | Refills: 0 | Status: SHIPPED | OUTPATIENT
Start: 2024-12-04

## 2024-12-04 RX ORDER — PREGABALIN 25 MG/1
25 CAPSULE ORAL 2 TIMES DAILY
Qty: 42 CAPSULE | Refills: 0 | Status: SHIPPED | OUTPATIENT
Start: 2024-12-04

## 2024-12-04 RX ORDER — HYDROMORPHONE HYDROCHLORIDE 2 MG/1
4 TABLET ORAL EVERY 4 HOURS PRN
Qty: 30 TABLET | Refills: 0 | Status: CANCELLED | OUTPATIENT
Start: 2024-12-04

## 2024-12-04 RX ADMIN — METHADONE HYDROCHLORIDE 5 MG: 5 TABLET ORAL at 13:23

## 2024-12-04 RX ADMIN — PREGABALIN 25 MG: 25 CAPSULE ORAL at 10:29

## 2024-12-04 RX ADMIN — ACETAMINOPHEN 975 MG: 325 TABLET ORAL at 10:28

## 2024-12-04 RX ADMIN — ACETAMINOPHEN 975 MG: 325 TABLET ORAL at 13:23

## 2024-12-04 RX ADMIN — Medication 60 ML: at 10:32

## 2024-12-04 RX ADMIN — METHADONE HYDROCHLORIDE 5 MG: 5 TABLET ORAL at 10:29

## 2024-12-04 RX ADMIN — METRONIDAZOLE 500 MG: 500 TABLET, FILM COATED ORAL at 13:23

## 2024-12-04 RX ADMIN — TORSEMIDE 60 MG: 20 TABLET ORAL at 10:29

## 2024-12-04 RX ADMIN — CEFEPIME 2 G: 2 INJECTION, POWDER, FOR SOLUTION INTRAVENOUS at 10:28

## 2024-12-04 RX ADMIN — METRONIDAZOLE 500 MG: 500 TABLET, FILM COATED ORAL at 10:29

## 2024-12-04 RX ADMIN — CEFEPIME 2 G: 2 INJECTION, POWDER, FOR SOLUTION INTRAVENOUS at 00:05

## 2024-12-04 RX ADMIN — LIDOCAINE: 50 OINTMENT TOPICAL at 10:33

## 2024-12-04 RX ADMIN — METHADONE HYDROCHLORIDE 5 MG: 5 TABLET ORAL at 17:01

## 2024-12-04 RX ADMIN — INSULIN DEGLUDEC 28 UNITS: 100 INJECTION, SOLUTION SUBCUTANEOUS at 10:30

## 2024-12-04 RX ADMIN — APIXABAN 5 MG: 5 TABLET, FILM COATED ORAL at 10:29

## 2024-12-04 RX ADMIN — VANCOMYCIN HYDROCHLORIDE 1000 MG: 1 INJECTION, SOLUTION INTRAVENOUS at 11:16

## 2024-12-04 ASSESSMENT — ACTIVITIES OF DAILY LIVING (ADL)
ADLS_ACUITY_SCORE: 61
ADLS_ACUITY_SCORE: 62
ADLS_ACUITY_SCORE: 61
ADLS_ACUITY_SCORE: 62
ADLS_ACUITY_SCORE: 61
ADLS_ACUITY_SCORE: 62
ADLS_ACUITY_SCORE: 61
ADLS_ACUITY_SCORE: 62
ADLS_ACUITY_SCORE: 61
ADLS_ACUITY_SCORE: 61

## 2024-12-04 NOTE — PLAN OF CARE
"Arrangements were made for patient to return to [facility].  Patient was tearful about this and scared that she will not get the care she needs based on her previous experience.  Reassurance provided to advocate for herself.     Problem: Adult Inpatient Plan of Care  Goal: Plan of Care Review  Description: The Plan of Care Review/Shift note should be completed every shift.  The Outcome Evaluation is a brief statement about your assessment that the patient is improving, declining, or no change.  This information will be displayed automatically on your shift  note.  Outcome: Adequate for Care Transition  Goal: Patient-Specific Goal (Individualized)  Description: You can add care plan individualizations to a care plan. Examples of Individualization might be:  \"Parent requests to be called daily at 9am for status\", \"I have a hard time hearing out of my right ear\", or \"Do not touch me to wake me up as it startles  me\".  Outcome: Adequate for Care Transition  Goal: Absence of Hospital-Acquired Illness or Injury  Outcome: Adequate for Care Transition  Intervention: Identify and Manage Fall Risk  Recent Flowsheet Documentation  Taken 12/4/2024 0900 by Blanquita Linda RN  Safety Promotion/Fall Prevention:   assistive device/personal items within reach   safety round/check completed  Intervention: Prevent Skin Injury  Recent Flowsheet Documentation  Taken 12/4/2024 0900 by Blanquita Linda, RN  Skin Protection: silicone foam dressing in place  Goal: Optimal Comfort and Wellbeing  Outcome: Adequate for Care Transition  Intervention: Monitor Pain and Promote Comfort  Recent Flowsheet Documentation  Taken 12/4/2024 1323 by Blanquita Linda RN  Pain Management Interventions: medication (see MAR)  Goal: Readiness for Transition of Care  Outcome: Adequate for Care Transition     Problem: Skin Injury Risk Increased  Goal: Skin Health and Integrity  Outcome: Adequate for Care Transition  Intervention: Plan: Nurse Driven " Intervention: Moisture Management  Recent Flowsheet Documentation  Taken 12/4/2024 0900 by Blanquita Linda RN  Moisture Interventions:   No brief in bed   Urinary collection device  Intervention: Plan: Nurse Driven Intervention: Friction and Shear  Recent Flowsheet Documentation  Taken 12/4/2024 0900 by Blanquita Linda RN  Friction/Shear Interventions: Silicone foam sacral dressing  Intervention: Optimize Skin Protection  Recent Flowsheet Documentation  Taken 12/4/2024 0900 by Blanquita Linda RN  Pressure Reduction Techniques: positioned off wounds  Skin Protection: silicone foam dressing in place     Problem: Pain Acute  Goal: Optimal Pain Control and Function  Outcome: Adequate for Care Transition  Intervention: Optimize Psychosocial Wellbeing  Recent Flowsheet Documentation  Taken 12/4/2024 0900 by Blanquita Linda RN  Supportive Measures:   active listening utilized   verbalization of feelings encouraged  Intervention: Develop Pain Management Plan  Recent Flowsheet Documentation  Taken 12/4/2024 1323 by Blanquita Linda RN  Pain Management Interventions: medication (see MAR)     Problem: Infection  Goal: Absence of Infection Signs and Symptoms  Outcome: Adequate for Care Transition   Goal Outcome Evaluation:

## 2024-12-04 NOTE — PROGRESS NOTES
BRIEF SOCIAL WORK NOTE:    0908: YUDY called [facility]. LVM with Claudette requesting call back.     0929: YUDY called [facility], [phone], [facility] requesting call back.     0932: YUDY received call from Claudette. Claudette shared her concerns are with dilaudid and wound cares. YUDY explained that these are not new and the last time patient was discharge she had oral dilaudid. Claudette expressed concerns with IV dilaudid need. YUDY explained pt has rarely needed IV and even oral. Clauedtte shared their MAR is showing IV. YUDY agreed to send over new MAR and Claudette will talk with DON.    Received vm from [facility] RN requesting call back.    2467: YUDY called [facility], [phone], [facility] with call back number.     Amy Chaudhry, [facility] HospitalSW

## 2024-12-04 NOTE — PLAN OF CARE
Problem: Skin Injury Risk Increased  Goal: Skin Health and Integrity  Outcome: Progressing  Intervention: Plan: Nurse Driven Intervention: Moisture Management  Recent Flowsheet Documentation  Taken 12/4/2024 0000 by Reyes, Dora, RN  Moisture Interventions:   Incontinence pad   No brief in bed   Urinary collection device  Intervention: Plan: Nurse Driven Intervention: Friction and Shear  Recent Flowsheet Documentation  Taken 12/4/2024 0000 by Reyes, Dora, RN  Friction/Shear Interventions: Silicone foam sacral dressing  Intervention: Optimize Skin Protection  Recent Flowsheet Documentation  Taken 12/4/2024 0000 by Reyes, Dora, RN  Pressure Reduction Techniques:   frequent weight shift encouraged   positioned off wounds  Pressure Reduction Devices: foam padding utilized  Skin Protection: adhesive use limited  Head of Bed (HOB) Positioning: HOB at 45 degrees     Problem: Pain Acute  Goal: Optimal Pain Control and Function  Outcome: Progressing   Goal Outcome Evaluation:    Pt AxO, no acute changes this shift. VSS on ra. Reports 3/10 pain scale, no prn pain meds administered. Wound vac patent and draining. IV abx cefepime administered this shift. RN attempted to lower bed to lower bed to lowest setting, pt refused and was specific on having bed at higher height, education given r/t risk for falls. Call light within reach.

## 2024-12-04 NOTE — PROGRESS NOTES
Care Management Discharge Note    Discharge Date: 12/04/2024       Discharge Disposition: Transitional Care    Discharge Services:      Discharge DME: None    Discharge Transportation: agency    Private pay costs discussed: transportation costs    Does the patient's insurance plan have a 3 day qualifying hospital stay waiver?  No    PAS Confirmation Code: [id number]  Patient/family educated on Medicare website which has current facility and service quality ratings: no    Education Provided on the Discharge Plan:  Yes  Persons Notified of Discharge Plans:  patient, daughter Lu  Patient/Family in Agreement with the Plan: yes    Handoff Referral Completed: No, handoff not indicated or clinically appropriate    Additional Information:    [facility] stretcher transport secured to [facility] today 12/4, pickup window 1540-1630pm.    Discharge plans confirmed with Claudette at TCU.    Discharge orders sent to TCU via [employer].  HUC to fax to facility/send with patient: discharge orders, hard scripts, additional discharge paperwork, as per protocol.      PAS - not needed d/t return to [place].    PCS done.    No further care management intervention anticipated at this time.  Please re-consult if further needs arise.  Care management signing off.     See also CM progress note 12/4/24.     Juan Briggs RN

## 2024-12-04 NOTE — DISCHARGE SUMMARY
"[facility]  Discharge Summary - Medicine & Pediatrics       Date of Admission:  11/25/2024  Date of Discharge:  12/4/2024  Discharging Provider: Wes Cobos MD PGY-1 Attending physician: Dr Ann  Discharge Service: Hospitalist Service    Discharge Diagnoses   Venous stasis ulcer   RLE BKA   LLE BKA    Clinically Significant Risk Factors     # DMII: A1C = 8.4 % (Ref range: <5.7 %) within past 6 months  # Obesity: Estimated body mass index is 36.44 kg/m  as calculated from the following:    Height as of 11/13/24: 1.676 m (5' 6\").    Weight as of this encounter: 102.4 kg (225 lb 12 oz).       Follow-ups Needed After Discharge   Follow-up Appointments       Follow Up and recommended labs and tests      Follow up with Nursing home physician.        Follow Up and recommended labs and tests      Follow up with senior living physician.  The following labs/tests are recommended: weekly cbc, Bmp, crp and vancomycin level through AUC. Send to infectious disease. Patient will be on antibiotics until BKA closure, currently, that is scheduled for 1/7/25.                Unresulted Labs Ordered in the Past 30 Days of this Admission       Date and Time Order Name Status Description    11/14/2024  1:45 PM Surgical Pathology Exam In process         These results will be followed up by PCP.    Discharge Disposition   Discharged to assisted living  Condition at discharge: Stable    Hospital Course   Linda Loredo was admitted on 11/25/2024 for Wound care.  The following problems were addressed during her hospitalization:    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  Cultures were polymicrobial and patient has documented history of ESBL and MRSA.  Continue vancomycin, cefepime, metronidazole until stump closure per ID  Weekly CBC, BMP, CRP, and vancomycin level  Wound care team has instruction for wound dressing change for the " patient please follow the specific instructions during wound dressing change.  Type 2 diabetes mellitus  A1c 8.4%.   Very long history of above goal A1c. Sees [facility] endocrinology.  Previously was taking Ozempic, but now only takes insulin. Will continue same insulin regimen has previous admissions.  Continue Tresiba 28 units every morning    Consultations This Hospital Stay   VASCULAR SURGERY IP CONSULT  VASCULAR SURGERY IP CONSULT  CARE MANAGEMENT / SOCIAL WORK IP CONSULT  WOUND OSTOMY CONTINENCE NURSE  IP CONSULT  PHARMACY TO DOSE VANCO  VASCULAR SURGERY IP CONSULT  PAIN MANAGEMENT ADULT IP CONSULT  SPIRITUAL HEALTH SERVICES IP CONSULT  ORTHOPEDIC SURGERY IP CONSULT  PSYCHIATRY IP CONSULT  OCCUPATIONAL THERAPY ADULT IP CONSULT  PHYSICAL THERAPY ADULT IP CONSULT    Code Status   Full Code       The patient was discussed with Dr. Seth Cobos MD  [facility]  [address] [place] [id number]  Phone: [phone]  Fax: [phone]  ______________________________________________________________________    Physical Exam   Vital Signs: Temp: 97.8  F (36.6  C) Temp src: Oral BP: 123/79 Pulse: 87   Resp: 18 SpO2: 98 % O2 Device: None (Room air)    Weight: 225 lbs 12.02 oz  Constitutional: awake, alert, cooperative, no apparent distress, and appears stated age  ENT: Normocephalic, without obvious abnormality, atraumatic, oral pharynx with moist mucous membranes, tonsils without erythema or exudates, gums normal and poor dentition.  Respiratory: No increased work of breathing, good air exchange, clear to auscultation bilaterally, no crackles or wheezing  Cardiovascular: Normal apical impulse, regular rate and rhythm, normal S1 and S2, no S3 or S4, and no murmur noted  GI: No scars, normal bowel sounds, soft, non-distended, non-tender, no masses palpated, no hepatosplenomegally  Musculoskeletal: Left BKA. Right BKA with posterolateral venous stasis ulcer, anterior  superficial skin necrosis, and open distal wound. Patient has wound vac in place.       Primary Care Physician   [facility]    Discharge Orders      Follow Up and recommended labs and tests    Follow up with Nursing home physician.     Reason for your hospital stay    Wound care     Activity - Up with nursing assistance     General info for SNF    Length of Stay Estimate: Short Term Care: Estimated # of Days <30  Condition at Discharge: Stable  Level of care:skilled   Rehabilitation Potential: Fair  Admission H&P remains valid and up-to-date: Yes  Recent Chemotherapy: N/A  Use Nursing Home Standing Orders: Yes     Wound care    Site:   Right lower extremity  Instructions:         Summary: WOUND VAC CHANGE INSTRUCTIONS      WOUND VAC CHANGE INSTRUCTIONS (FOR TCU)     Step 1: Premedicate with oral pain medication 1hr before procedure, turn off wound vac to allow foam to loosen. May moisten vac foam with saline to assist in gentle removal.         Step 2: Use adhesive remover to gently remove vac dressings.                Step 3: Soak both wounds with moist vashe gauze for 5-10 minutes, pat dry intact skin.                       Step 4: Use skin prep, or barrier film around entire periwound to help protect skin. Allow to fully dry for 60 seconds.                 Step 5: Cover both wounds with a nonadherent layer - Urgotul AG used (can use Oil emulsion gauze or mepitel, do not use xeroform). You do not need to predrape as long as the nonadherent layer covers a wider area than the foam.                  Step 6: Place vac foam over area, to the size of the wound and cover with drape.                   Step 7: Cut 2 holes, 1 in each dressing for the trac pads, Y site the tubing together.                 Step 8: begin vac therapy at -125mmHg. Patch any leaks noted.     Follow Up and recommended labs and tests    Follow up with correction physician.  The following labs/tests are recommended: weekly cbc,  Bmp, crp and vancomycin level through AUC. Send to infectious disease. Patient will be on antibiotics until BKA closure, currently, that is scheduled for 1/7/25.     Occupational Therapy Adult Consult    Evaluate and treat as clinically indicated.    Reason:  deconditioning     Physical Therapy Adult Consult    Evaluate and treat as clinically indicated.    Reason:  deconditioning     Diet    Follow this diet upon discharge: Current Diet:Orders Placed This Encounter      Snacks/Supplements Adult: Glucerna; With Meals      Snacks/Supplements Adult: Expedite Bottle; With Meals      Moderate Consistent Carb (60 g CHO per Meal) Diet       Significant Results and Procedures   Results for orders placed or performed during the hospital encounter of 11/25/24   US Lower Extremity Venous Duplex Right    Narrative    EXAM: US LOWER EXTREMITY VENOUS DUPLEX RIGHT  LOCATION: [facility]  DATE: 11/27/2024    INDICATION: Evaluate possible DVT. Right lower extremity swelling. History of below-the-knee amputation.  COMPARISON: None.  TECHNIQUE: Venous Duplex ultrasound of the right lower extremity with and without compression, augmentation and duplex. Color flow and spectral Doppler with waveform analysis performed.    FINDINGS: Exam includes the common femoral, femoral, popliteal, and contralateral common femoral veins as well as segmentally visualized deep calf veins and greater saphenous vein.     RIGHT: No deep vein thrombosis. No superficial thrombophlebitis. No popliteal cyst.      Impression    IMPRESSION:  1.  No deep venous thrombosis in the right lower extremity.       Discharge Medications   Current Discharge Medication List        CONTINUE these medications which have CHANGED    Details   HYDROmorphone (DILAUDID) 2 MG tablet Take 2 tablets (4 mg) by mouth every 4 hours as needed for breakthrough pain (please give 1 hour before dressing change every 5 days).  Qty: 30 tablet, Refills: 0     Associated Diagnoses: 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)      methadone (DOLOPHINE) 5 MG tablet Take 1 tablet (5 mg) by mouth 4 times daily.  Qty: 42 tablet, Refills: 0    Associated Diagnoses: Opioid dependence, uncomplicated (H)      pregabalin (LYRICA) 25 MG capsule Take 1 capsule (25 mg) by mouth 2 times daily.  Qty: 42 capsule, Refills: 0    Associated Diagnoses: Other chronic pain           CONTINUE these medications which have NOT CHANGED    Details   acetaminophen (TYLENOL) 325 MG tablet Take 3 tablets (975 mg) by mouth 3 times daily.  Qty: 180 tablet, Refills: 0    Associated Diagnoses: 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)      apixaban ANTICOAGULANT (ELIQUIS) 5 MG tablet Take 1 tablet (5 mg) by mouth 2 times daily.  Qty: 60 tablet, Refills: 0    Associated Diagnoses: Chronic atrial fibrillation (H)      atorvastatin (LIPITOR) 40 MG tablet Take 1 tablet (40 mg) by mouth every evening    Associated Diagnoses: Hx of BKA, left (H)      calcium carbonate (TUMS) 500 MG chewable tablet Take 1 tablet (500 mg) by mouth 4 times daily as needed for heartburn.  Qty: 90 tablet, Refills: 0    Associated Diagnoses: Vitamin D deficiency      ceFEPIme (MAXIPIME) 2 g vial Inject 2 g over 30 minutes into the vein every 8 hours for 21 days.    Comments: Weekly CBC with differential, BMP, CRP, vancomycin trough/AUC  Associated Diagnoses: 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)      Insulin Aspart FlexPen 100 UNIT/ML SOPN Inject 5 Units subcutaneously 3 times daily (before meals).      insulin degludec (TRESIBA) 200 UNIT/ML pen Inject 34 Units subcutaneously every morning Hold for BG < 100      melatonin 1 MG TABS tablet Take 1 tablet (1 mg) by mouth nightly as needed for sleep    Associated Diagnoses: Insomnia, unspecified type      metroNIDAZOLE (FLAGYL) 500 MG tablet Take 1 tablet (500 mg) by mouth 3 times daily.  Qty: 42 tablet, Refills: 0     Associated Diagnoses: 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)      mineral oil-hydrophilic petrolatum (AQUAPHOR) external ointment Apply to left thigh wound BID    Associated Diagnoses: Skin lesion      ondansetron (ZOFRAN ODT) 4 MG ODT tab Take 1 tablet (4 mg) by mouth every 6 hours as needed for nausea or vomiting.  Qty: 30 tablet, Refills: 0    Associated Diagnoses: Nausea      polyethylene glycol (MIRALAX) 17 g packet Take 17 g by mouth 2 times daily as needed for constipation.  Qty: 30 packet, Refills: 3    Associated Diagnoses: Constipation, unspecified constipation type      senna-docusate (SENOKOT-S/PERICOLACE) 8.6-50 MG tablet Take 1 tablet by mouth 2 times daily as needed for constipation.  Qty: 60 tablet, Refills: 0    Associated Diagnoses: Constipation, unspecified constipation type      sennosides (SENOKOT) 8.6 MG tablet Take 2 tablets by mouth 2 times daily.  Qty: 180 tablet, Refills: 0    Associated Diagnoses: Constipation, unspecified constipation type      torsemide (DEMADEX) 20 MG tablet Take 60 mg by mouth daily      vancomycin (VANCOCIN) 1250 mg/250 mL IVPB Inject 1,250 mg over 90 minutes into the vein every 24 hours for 21 days. Weekly CBC with differential, BMP, CRP vancomycin trough/AUC    Associated Diagnoses: 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)      wound support modular (EXPEDITE) LIQD bottle Take 60 mLs by mouth daily    Associated Diagnoses: Ulcer of right lower extremity with fat layer exposed (H)      Zinc oxide 10 % CREA Externally apply topically 3 times daily Apply to coccyx area once per shift after brief changes           Allergies   Allergies   Allergen Reactions    Prednisone Nausea and Vomiting    Morphine Nausea and Vomiting    Morphine [Fumaric Acid] Nausea and Vomiting    Nitrofurantoin Unknown     Per nursing home

## 2024-12-04 NOTE — PROGRESS NOTES
[id number]    Patient is alert and oriented. VSS. Endorses adequate pain control with schedule tylenol and lidocaine ointment. Wound vac maintaining seal and therapy. Voiding per purewick. Encourage to continue to reposition self in bed frequently to prevent further skin breakdown. Plan of care ongoing.

## 2024-12-04 NOTE — PROGRESS NOTES
Will not see today:  PAIN MANAGEMENT SERVICE CHART CHECK  This patient's chart has been reviewed by the Pain Service. It has been determined that no change is necessary to the pain management regimen at this time. The chart will be reviewed regularly and the patient will be seen if necessary. If you would like the patient to be seen, please place a new consult or page via [employer].     Thank you!        Tasia ROSA, CNS-BC, CNP   Acute Care Pain Management Program Mon-Fri 7a-1700  [facility] (Acute Pain)  Page via [employer] - Click here

## 2024-12-04 NOTE — PROGRESS NOTES
Care Management Follow Up    Length of Stay (days): 9    Expected Discharge Date: 12/05/2024     Concerns to be Addressed: discharge planning     Patient plan of care discussed at interdisciplinary rounds: Yes    Anticipated Discharge Disposition: Transitional Care     Anticipated Discharge Services:    Anticipated Discharge DME: None    Patient/family educated on Medicare website which has current facility and service quality ratings: no  Education Provided on the Discharge Plan:  Yes  Patient/Family in Agreement with the Plan: yes    Referrals Placed by CM/SW: Post Acute Facilities  Private pay costs discussed: Transportation - discussed with pt and daughter Lu; pt has [place] secondary insurance    Discussed  Partnership in Safe Discharge Planning  document with patient/family: No     Handoff Completed: No, handoff not indicated or clinically appropriate    Additional Information:    [phone] CM [facility]; left voicemail with Claudette in admissions.    9:07 AM  [facility] - Pt has a bed hold here.  Per Claudette; this TCU states they can no longer meet pt's needs and declines to accept pt back.  Update given to CM supervisor, Silke.    9:32 AM   Per Dr. Wes Cobos of BFM team, anticipate IV abx needed through 1/7.    CM notified Dr. Cobos:  Need PT/OT evals to clarify if pt will require therapies while at TCU.  Some TCU's cannot consider pt for acceptance until pt can tolerate wound vac change on oral pain meds only.    Next wound vac change is Friday per WOCN note.    [place] [facility] - Update given to Yesenia Villarreal TCU's - Update given to Trini    12:21 PM  [facility] TCU - Per Claudette, after reviewing pt's updated notes and MAR again: pt welcome to return to this TCU with a ride time no later than 1730pm.    [facility] stretcher transport secured to [facility] today 12/4, pickup window 1540-1630pm.    Update given to bedside RN, Charge RN, ZIA.  Update  given to pt, daughter Lu.  Update given to Dr. Cobos of [facility] team.    PAS - not needed d/t return to [place]    1:18 PM  PCS done.    1:19 PM  [facility] [phone] - Left voicemail on Mist[facility]'s confidential VM line.     CM will continue to follow.    Juan Briggs RN

## 2024-12-05 ENCOUNTER — TELEPHONE (OUTPATIENT)
Dept: VASCULAR SURGERY | Facility: CLINIC | Age: 79
End: 2024-12-05
Payer: MEDICARE

## 2024-12-05 ENCOUNTER — PATIENT OUTREACH (OUTPATIENT)
Dept: CARE COORDINATION | Facility: CLINIC | Age: 79
End: 2024-12-05
Payer: MEDICARE

## 2024-12-05 NOTE — TELEPHONE ENCOUNTER
Call back to [facility], advised that pt cannot see Dr. Strong as he is a podiatrist and she had an amputation. She did request that I speak with pt. Call to pt, she is refusing to see Dr. Farmer, wants to see someone else. Pt was scheduled with MOE Lee, CNP    Lizabeth Botello, RN, CWOCN

## 2024-12-05 NOTE — TELEPHONE ENCOUNTER
Caller: Shell Rey     Provider: MD Luis E Rogers MD Yauhen Tarbunou    Detailed reason for call: Patient is scheduled to see Dr. Morley on Monday, but is requesting to see Dr. Strong if appropriate. Please advise.     Best phone number to contact: [phone]    Best time to contact: Any time    Ok to leave a detailed message: Yes    Ok to speak to authorized person if needed: No      (Noted to patient if reason is related to wound or incision, to please send a photo via email or [employer].)

## 2024-12-05 NOTE — PROGRESS NOTES
Connected Care Resource Center: Connected Care Resource [place]    Background: Transitional Care Management program identified per system criteria and reviewed by [facility] Care Resource Center team for possible outreach.    Assessment: Upon chart review, CCRC Team member will not proceed with patient outreach related to this episode of Transitional Care Management program due to reason below:    Non-[facility] TCU: CCRC team member noted patient discharged to TCU/ARU/LTACH. Patient is not established with a [facility] currently supported by Primary Care-Care Coordination therefore handoff to Primary Care-Care Coordination is not appropriate at this time.    Plan: Transitional Care Management episode addressed appropriately per reason noted above.      Katie Villarreal  Community Health Worker  [facility], [facility]  [phone]      *Connected Care Resource Team does NOT follow patient ongoing. Referrals are identified based on internal discharge reports and the outreach is to ensure patient has an understanding of their discharge instructions.

## 2024-12-06 ENCOUNTER — LAB REQUISITION (OUTPATIENT)
Dept: LAB | Facility: CLINIC | Age: 79
End: 2024-12-06
Payer: MEDICARE

## 2024-12-06 DIAGNOSIS — L08.9 LOCAL INFECTION OF THE SKIN AND SUBCUTANEOUS TISSUE, UNSPECIFIED: ICD-10-CM

## 2024-12-09 ENCOUNTER — DOCUMENTATION ONLY (OUTPATIENT)
Dept: ORTHOPEDICS | Facility: CLINIC | Age: 79
End: 2024-12-09
Payer: MEDICARE

## 2024-12-09 ENCOUNTER — LAB REQUISITION (OUTPATIENT)
Dept: LAB | Facility: CLINIC | Age: 79
End: 2024-12-09
Payer: MEDICARE

## 2024-12-09 ENCOUNTER — TELEPHONE (OUTPATIENT)
Dept: VASCULAR SURGERY | Facility: CLINIC | Age: 79
End: 2024-12-09
Payer: MEDICARE

## 2024-12-09 DIAGNOSIS — L08.9 LOCAL INFECTION OF THE SKIN AND SUBCUTANEOUS TISSUE, UNSPECIFIED: ICD-10-CM

## 2024-12-09 NOTE — PROGRESS NOTES
[facility]        Patient is here for a post-op for right saritha YOUNGA. Completion surgery scheduled for 1/9/25.    Pt is currently taking Statin and Eliquis.    /76   Pulse 82   Temp 97.5  F (36.4  C)   Resp 16     The provider has been notified that the patient has no concerns.     Questions patient would like addressed today are: N/A.    Refills are needed: No    Has homecare services and agency name:  No. At [facility]

## 2024-12-09 NOTE — TELEPHONE ENCOUNTER
Caller: [facility]     Provider: MD Tierney Sanches    Detailed reason for call: Requesting clarification on IV abx and how long patient should continue until 1/9 and/or if there is an end date. TCU states they don't have any orders or notes from hospital or ID.    Best phone number to contact: [phone]    Best time to contact: Any time    Ok to leave a detailed message: Yes    Ok to speak to authorized person if needed: No      (Noted to patient if reason is related to wound or incision, to please send a photo via email or [employer].)

## 2024-12-09 NOTE — PATIENT INSTRUCTIONS
"Caitlin Mckeon,    Thank you for entrusting your care with us today. After your visit today with SHANEL Felipe this is the plan that was discussed at your appointment.    Continue with current wound cares. Please replace dressings when she returns to unit.     Follow up with Rosario Lawler NP on 1/2/25 as scheduled.    3. Surgery for completion of Right BKA is going to be cancelled at this time to allow for further healing of right posterior leg wound.       I am including additional information on these things and our contact information if you have any questions or concerns.   Please do not hesitate to reach out to us if you felt we did not answer your questions or you are unsure of the treatment plan after your visit today. Our number is [phone].Thank you for trusting us with your care.         Again thank you for your time.     Wound care supplies were not ordered or needed today.           Wound Care Instructions     2 TIMES PER WEEK and as needed, Cleanse your R) posterior thigh wound(s) with Wound cleanser.     Pat Dry with non-sterile gauze     Apply Lotion to the intact skin surrounding your wound and other dry skin locations. Some good lotions include: Remedy Skin Repair Cream, Sarna, Vanicream or Cetaphil     Primary Dressing: Apply hydrogel on adaptic then into/onto the wounds     Secondary dressing: Cover with hydrofera blue, ABD      Secure with non-sterile roll gauze (4\" x 75\" roll) and tape (1\" roll tape) as needed; avoid adhesive directly on the skin     R) stump- continue with wound vac twice a week. Use white foam and black foam. Ensure she gets pain medication and pain is control prior to a change.     BL groin, perineum, inner thighs wound : BID for two weeks  Cleanse the area with Marisol cleanse and protect, very gently with soft cloth.  Apply BAZAantifungal twice a day. Use Triad paste in between if pt needs cleaning.  With repeat application, do not scrub the paste, only remove " soiled paste and reapply.  If complete removal of paste is necessary use baby oil/mineral oil and soft wash cloth.  Ensure pt has cushion while sitting up in the chair.  Use only one incontinence pad in between mattress and pt. No diaper while in bed.      Continue with low air loss mattress.  Patient's pain needs to be control for wound changes.      It is not ok to get your wound wet in the bath or shower        If for some reason you are not able to get your dressing(s) changed as outlined above (due to illness, lack of supplies, lack of help) please do the following: remove old, soiled dressings; wash the wounds with saline; pat dry; apply ABD pad or other absorbant pad and secure with rolled gauze; avoid tape directly on your skin; Call the clinic as soon as possible to let us know what the current issues are in receiving wound care [phone].        SEEK MEDICAL CARE IF:  You have an increase in swelling, pain, or redness around the wound.  You have an increase in the amount of pus coming from the wound.  There is a bad smell coming from the wound.  The wound appears to be worsening/enlarging  You have a fever greater than 101.5 F        It is ok to continue current wound care treatment/products for the next 2-3 days until new wound care supplies are ordered and arrive. If longer than this please contact our office at [phone].        Please NOTE: if you are 15 minutes late to your arrival time, you will have to be rescheduled. Please call our clinic as soon as possible if you know you will not be able to get to your appointment at [phone]. If you fail to show up to 3 scheduled clinic appointments you will be dismissed from our clinic.     We want to hear from you!  In the next few weeks, you should receive a call or email to complete a survey about your visit at [facility]. Please help us improve your appointment experience by letting us know how we did today. We strive to make  your experience good and value any ways in which we could do better.       We value your input and suggestions.     Thank you for choosing the [facility]!      It is recommended that you do not get your ulcer wet when showering.  Listed below are several ways of keeping it dry when you shower.      1. Wrap it with Press and Seal plastic wrap.  It can be found in the stores where the plastic wraps or tin foil is kept.                  2.  Some people take a bath and hang their leg/foot out of the tub.                             3  Put your leg in a plastic bag and tape it on.             4. You can purchase a shower cover for casts at some pharmacies and through the Internet.                       5. Take a Bed Bath or wash up at the sink

## 2024-12-09 NOTE — TELEPHONE ENCOUNTER
Left a message for Eleuterio.     Per chart IV ABX end today per ID. No future follow-up with ID. Pt will see Rosario tomorrow. If concerns of infection, will be evaluated then. Told to call back with any furthers questions.

## 2024-12-09 NOTE — PROGRESS NOTES
I spoke with Pat on the phone today. After being discharged from the hospital on 11/18/24, she was readmitted on 11/25/24 for increased leg pain and to perform a wound check. She was discharged to a TCU on 12/4/24. She is currently using a wound vac. Pt is scheduled for completion surgery of right BKA with Dr. Sanches on 1/9/25. Pat stated she would like to discuss prosthetic care further after she has had her completion surgery and is a little closer to being healed. I will plan to reach out to her after her completion surgery in January. Pt has our contact information and was invited to call with any questions she has regarding prosthetic care if she would like.    Electronically signed by Yael Orlando CPO, LPO

## 2024-12-10 ENCOUNTER — OFFICE VISIT (OUTPATIENT)
Dept: VASCULAR SURGERY | Facility: CLINIC | Age: 79
End: 2024-12-10
Payer: MEDICARE

## 2024-12-10 VITALS — OXYGEN SATURATION: 100 % | HEART RATE: 91 BPM | SYSTOLIC BLOOD PRESSURE: 132 MMHG | DIASTOLIC BLOOD PRESSURE: 80 MMHG

## 2024-12-10 DIAGNOSIS — R32 DERMATITIS ASSOCIATED WITH INCONTINENCE: ICD-10-CM

## 2024-12-10 DIAGNOSIS — E11.8 TYPE 2 DIABETES MELLITUS WITH COMPLICATION, WITH LONG-TERM CURRENT USE OF INSULIN (H): ICD-10-CM

## 2024-12-10 DIAGNOSIS — Z79.4 TYPE 2 DIABETES MELLITUS WITH COMPLICATION, WITH LONG-TERM CURRENT USE OF INSULIN (H): ICD-10-CM

## 2024-12-10 DIAGNOSIS — B49 FUNGAL INFECTION: Primary | ICD-10-CM

## 2024-12-10 DIAGNOSIS — L25.8 DERMATITIS ASSOCIATED WITH INCONTINENCE: ICD-10-CM

## 2024-12-10 DIAGNOSIS — Z89.512 STATUS POST BELOW-KNEE AMPUTATION OF LEFT LOWER EXTREMITY (H): Chronic | ICD-10-CM

## 2024-12-10 DIAGNOSIS — I73.9 PAD (PERIPHERAL ARTERY DISEASE) (H): ICD-10-CM

## 2024-12-10 LAB
ANION GAP SERPL CALCULATED.3IONS-SCNC: 11 MMOL/L (ref 7–15)
BASOPHILS # BLD AUTO: 0.1 10E3/UL (ref 0–0.2)
BASOPHILS NFR BLD AUTO: 1 %
BUN SERPL-MCNC: 33.1 MG/DL (ref 8–23)
CALCIUM SERPL-MCNC: 11 MG/DL (ref 8.8–10.4)
CHLORIDE SERPL-SCNC: 95 MMOL/L (ref 98–107)
CREAT SERPL-MCNC: 0.76 MG/DL (ref 0.51–0.95)
CRP SERPL-MCNC: <3 MG/L
EGFRCR SERPLBLD CKD-EPI 2021: 79 ML/MIN/1.73M2
EOSINOPHIL # BLD AUTO: 0.5 10E3/UL (ref 0–0.7)
EOSINOPHIL NFR BLD AUTO: 7 %
ERYTHROCYTE [DISTWIDTH] IN BLOOD BY AUTOMATED COUNT: 16.6 % (ref 10–15)
EST. AVERAGE GLUCOSE BLD GHB EST-MCNC: 189 MG/DL
GLUCOSE SERPL-MCNC: 229 MG/DL (ref 70–99)
HBA1C MFR BLD: 8.2 %
HCO3 SERPL-SCNC: 30 MMOL/L (ref 22–29)
HCT VFR BLD AUTO: 36.7 % (ref 35–47)
HGB BLD-MCNC: 11.1 G/DL (ref 11.7–15.7)
IMM GRANULOCYTES # BLD: 0 10E3/UL
IMM GRANULOCYTES NFR BLD: 0 %
LYMPHOCYTES # BLD AUTO: 1.3 10E3/UL (ref 0.8–5.3)
LYMPHOCYTES NFR BLD AUTO: 18 %
MCH RBC QN AUTO: 27.9 PG (ref 26.5–33)
MCHC RBC AUTO-ENTMCNC: 30.2 G/DL (ref 31.5–36.5)
MCV RBC AUTO: 92 FL (ref 78–100)
MONOCYTES # BLD AUTO: 0.6 10E3/UL (ref 0–1.3)
MONOCYTES NFR BLD AUTO: 8 %
NEUTROPHILS # BLD AUTO: 4.8 10E3/UL (ref 1.6–8.3)
NEUTROPHILS NFR BLD AUTO: 66 %
NRBC # BLD AUTO: 0 10E3/UL
NRBC BLD AUTO-RTO: 0 /100
PLATELET # BLD AUTO: 226 10E3/UL (ref 150–450)
POTASSIUM SERPL-SCNC: 4.6 MMOL/L (ref 3.4–5.3)
RBC # BLD AUTO: 3.98 10E6/UL (ref 3.8–5.2)
SODIUM SERPL-SCNC: 136 MMOL/L (ref 135–145)
VANCOMYCIN SERPL-MCNC: 18.9 UG/ML
WBC # BLD AUTO: 7.3 10E3/UL (ref 4–11)

## 2024-12-10 PROCEDURE — P9604 ONE-WAY ALLOW PRORATED TRIP: HCPCS | Mod: ORL | Performed by: FAMILY MEDICINE

## 2024-12-10 PROCEDURE — 36415 COLL VENOUS BLD VENIPUNCTURE: CPT | Mod: ORL | Performed by: FAMILY MEDICINE

## 2024-12-10 PROCEDURE — 86140 C-REACTIVE PROTEIN: CPT | Mod: ORL | Performed by: FAMILY MEDICINE

## 2024-12-10 PROCEDURE — 80202 ASSAY OF VANCOMYCIN: CPT | Mod: ORL | Performed by: FAMILY MEDICINE

## 2024-12-10 PROCEDURE — 80048 BASIC METABOLIC PNL TOTAL CA: CPT | Mod: ORL | Performed by: FAMILY MEDICINE

## 2024-12-10 PROCEDURE — G0463 HOSPITAL OUTPT CLINIC VISIT: HCPCS

## 2024-12-10 PROCEDURE — 85025 COMPLETE CBC W/AUTO DIFF WBC: CPT | Mod: ORL | Performed by: FAMILY MEDICINE

## 2024-12-10 PROCEDURE — 83036 HEMOGLOBIN GLYCOSYLATED A1C: CPT | Mod: ORL | Performed by: FAMILY MEDICINE

## 2024-12-10 RX ORDER — MICONAZOLE NITRATE 20 MG/G
CREAM TOPICAL 2 TIMES DAILY
Qty: 35 G | Refills: 1 | Status: SHIPPED | OUTPATIENT
Start: 2024-12-10 | End: 2024-12-31

## 2024-12-10 NOTE — PROGRESS NOTES
Wound Clinic Note          Visit date: 12/10/2024       Cheif Complaint:     Linda Loredo is a 79 year old  female had concerns including Wound Check (JERMAIN versus remain in chair (BKA wound)? 11/14 Right BKA guillotine (being vac'd). Still has right calf wounds. At [facility]. BKA completion surgery is set for 1/7/25. Prior Dr. Morley pt, refuses to see Dr. Morley).      Treatment Plan:    1. Treatment:  wound treatment will include irrigation and dressings to promote autolytic debridement which will include: R) stump- continue with wound vac twice a week with white foam and black foam. Pt needs to be premedicated prior to the appointment for dressing changes. Recommend to turn off the vac completely before leaving the facility to allow easy removal.     R) posterior leg- NS, hydrogel, adaptic, hydrofera blue, ABD and rolled gauze.    BL buttocks, thigh, intergluteal cleft- miconazole cream BID for 2 weeks and triad paste inbetween.     If for some reason the patient is not able to get your dressing(s) changed as outlined above (due to illness, lack of supplies, lack of help) please do the following: remove old, soiled dressings; wash the ulcers with saline; pat dry; apply ABD pad or other absorbant pad and secure with rolled gauze; avoid tape directly on your skin; Patient instructed to call the clinic as soon as possible to let us know what the current issues are in receiving ulcer care.    2. Edema. Elevate your legs above your heart for 20-30 minutes twice daily.     3. Compression- continue with short stretch or ace wrap    4. Offloading: no direct pressure over the wound.    5. Nutrition: Focus on Protein diet unless on renal diet. Consider protein supplements. Monitor blood glucose and diabetic diet.    6. Imaging: NA    7. Referral- continue to follow up with Dr. Quezada.     8. Wound Etiology: R) stump - Surgical, BL buttocks intergluteal cleft- fungal infection in the  setting of moisture associated skin damage   R) posterior thigh- venous stasis ulcer.    9.  Education: importance of air mattress, keeping the skin dry, pain management, repositioning, moisture management, blood glucose management.    I have explained to the patient the importance of protein intake to wound healing.  I have explained that increasing protein intake will speed wound healing.  We discussed several types of food that are high in protein and the wound care nurse gave the patient a handout that summarizes this information.  In addition to further speed wound healing I have encouraged the patient to take a protein supplement.     Patient to return to clinic in 3 week(s) for re-evaluation. They were instructed to call the clinic sooner with any further questions or concerns. Answered all questions.      HISTORY OF PRESENT ILLNESS:    Linda is being seen at [facility] today regarding BL amputation site. They arrive to the clinic today with her daughter. The patient reports that the ulcer has been present since 11/2024.  The wound began after a recent surgery and the incision did not heal normally.   Was currently/previously using wound vac on R) stump. There has been Moderate drainage from the wound. Reports pain of 10/10;  and has increased recently. Currently using dilaudid for pain. Has currently using short stretch as compression. Denies any fevers, chills, or generalized ill feeling. Sleeps in a air mattress bed. I personally reviewed outside imaging, lab work, and progress noted through Care Everywhere and outside records.    Venous stasis ulcer    Right calf- Chronic ulcer, which is on her , has been present for at least 1 year and 3 months. An excisional debridement of the area was performed in July 2024.    11/18- went back to [facility]. Wound vac has been only changed every 5 days.    11/14- right below the knee amputation saritha by Dr. Mansfield. Next  surgery scheduled on 1/9.    Will discuss nutrition during next visit        The patient reports taking their diabetes medications as ordered.  The patient is working with a provider who manages the diabetes to better control the blood sugars. and needs to also work on pain management.        The patient has recently had some symptoms of wound infection and is currently taking antibiotics which they have been tolerating well with no symptoms of an adverse reaction.       Previous imagings: NA    Problem List:   Past Medical History:   Diagnosis Date    Abscess of left foot 10/31/2022    Acute cystitis without hematuria 07/11/2024    Acute kidney injury (H) 07/11/2024    Adverse effect of anticoagulants, initial encounter 04/16/2024    GUSTAVO (acute kidney injury) (H) 05/26/2023    Allergic rhinitis 04/08/2008    Anemia 07/11/2024    Anticoagulation monitoring, INR range 2-3 07/06/2022    Anxiety 10/20/2011    Cardiac murmur, unspecified 05/29/2019    Cellulitis - bilateral lower extremities 05/27/2023    Cellulitis of buttock 05/26/2023    Chronic atrial fibrillation (H) 05/30/2022    New onset during 5/2022 admission      Chronic kidney disease, stage 3b (H) 06/12/2024    Chronic pain 04/26/2010    Chronic right-sided heart failure (H) 11/16/2023    Constipation 04/16/2024    Decubitus ulcers - 5 present on buttocks/perineal region, numerous scabbed over and unstagable on shin and bilateral feet with some bloody blisters noted on feet 05/27/2023    Depressive disorder, not elsewhere classified     Diabetic foot ulcer (H) 09/29/2023    Diabetic polyneuropathy associated with type 2 diabetes mellitus (H) 04/07/2006 February 26, 2007: on gabapentin.  She has been switched from gabapentin to lyrica.       Elevated liver enzymes 10/20/2011    Essential hypertension with goal blood pressure less than 140/90 05/09/2005    Fibromyalgia 10/20/2011    Fracture of fibula, distal, left, closed 10/20/2011    ORIF 10/13/11       Herpes zoster 2005: On gabapentin and lidoderm. She has been switched from gabapentin to lyrica.       Herpes zoster without mention of complication     Hx of osteomyelitis 2024    S.p left BKA       Hypercalcemia 2007    Hypoglycemia 2023    Hypotension 10/27/2011    Insomnia 07/15/2005    Lymphedema, not elsewhere classified 2024    Major depressive disorder, recurrent episode, moderate (H) 2008    Metabolic encephalopathy 2024    Microalbuminuria due to type 2 diabetes mellitus (H) 10/25/2016    Mild intermittent asthma     Mixed hyperlipidemia 2005    Mixed hyperlipidemia due to type 2 diabetes mellitus (H) 2008    Formatting of this note is different from the original.     22 ASCVD 10 year risk: 37.9%      Last Lipids:  Last Lipids:  Chol: 2021 130   63  HDL: 2021 46  Non-HDL: 2021 84  Chol/HDL Ratio: 2021 2.83  LDL DIRECT: . No results found in past 5 years   LDL CHOLESTEROL (mg/dL)   Date Value   2021 71      22   The 10-year ASCVD risk score (Flippin LUIS Jr.    Mixed stress and urge urinary incontinence 2005: On Detrol LA.      Non-healing wound of left heel 2023    Obesity with BMI >35 and comorbidity 2006: Body mass index is 48.07 kg/(m^2).       Opioid dependence, uncomplicated (H) 2023    BERLIN (obstructive sleep apnea) 10/23/2007    Sleep study 2004 reportedly showing severe OBSTRUCTIVE SLEEP APNEA and recommend CPAP, Not available for review. Patient is untreated       Osteoarthritis 2006    Osteomyelitis (H) 10/24/2023    Osteopenia 2015    Other abnormalities of gait and mobility 11/15/2023    Other urinary incontinence     Pressure injury of deep tissue of sacral region 2024    Primary hyperparathyroidism (H) 2013    Work up 2013 Dr. 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       Supratherapeutic INR 05/27/2023    Type 2 diabetes mellitus with complication, with long-term current use of insulin (H) 04/18/2005    diagnosed in 2001       Type II or unspecified type diabetes mellitus with renal manifestations, uncontrolled(250.42) (H)     Type II or unspecified type diabetes mellitus without mention of complication, not stated as uncontrolled     Umbilical hernia without obstruction and without gangrene 12/13/2018    Unspecified essential hypertension     Unspecified open wound, left foot, subsequent encounter 09/22/2023    UTI (urinary tract infection) - possible 05/26/2023    Venous stasis 04/16/2024    Vitamin D deficiency 09/08/2022    Warfarin-induced coagulopathy (H) 05/27/2023              Family Hx: family history includes Cancer in her maternal grandmother and paternal grandmother; Diabetes in her sister; Heart Disease in her father; Hypertension in her mother and sister; Psychotic Disorder in her sister; Respiratory in her sister.       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    Procedure: LEFT GUILLOTINE BELOW KNEE AMPUTATION.;  Surgeon: Lan Otto MD;  Location:  OR    AMPUTATE LEG BELOW KNEE Left 10/14/2023    Procedure: debridement of left below the knee amputation;  Surgeon: Lan Otto MD;  Location: [facility] OR    AMPUTATION, LOWER EXTREMITY, USING  GUILLOTINE TECHNIQUE Right 11/14/2024    Procedure: AMPUTATION, RIGHT LOWER EXTREMITY, USING GUILLOTINE TECHNIQUE;  Surgeon: Tierney Sanches MD;  Location: [facility]    APPLY WOUND VAC Left 1/2/2024    Procedure: WOUND VAC APPLICATION TO LEFT BELOW KNEE STUMP;  Surgeon: Lan Otto MD;  Location: [facility] OR    CHOLECYSTECTOMY      GRAFT SKIN SPLIT THICKNESS FROM TRUNK TO HEAD Left 1/2/2024    Procedure: APPLICATION OF SPLIT-THICKNESS SKIN GRAFT TO LEFT BELOW KNEE STUMP, GRAFT FROM LEFT THIGH;  Surgeon: Lan Otto MD;  Location: [facility] OR    INCISION AND DRAINAGE FOOT, COMBINED Left 9/26/2023    Procedure: Surgical debridement of all nonviable skin and soft tissue and bone left foot;  Surgeon: Nolberto Thorne DPM;  Location: [facility] OR    IR LOWER EXTREMITY ANGIOGRAM LEFT  10/3/2023    IRRIGATION AND DEBRIDEMENT LOWER EXTREMITY, COMBINED Right 7/14/2024    Procedure: IRRIGATION AND DEBRIDEMENT, LOWER EXTREMITY, RIGHT LOWER LEG;  Surgeon: Phuong Gutierrez MD;  Location: [facility] OR    ligation of fallopian tube      OPEN REDUCTION INTERNAL FIXATION ANKLE  10/13/11    left    PICC SINGLE LUMEN PLACEMENT  11/18/2024    pinning of left 2nd toe            Allergies:    Allergies   Allergen Reactions    Prednisone Nausea and Vomiting    Morphine Nausea and Vomiting    Morphine [Fumaric Acid] Nausea and Vomiting    Nitrofurantoin Unknown     Per nursing home              Medication History:    Current Outpatient Medications   Medication Sig Dispense Refill    acetaminophen (TYLENOL) 325 MG tablet Take 3 tablets (975 mg) by mouth 3 times daily. 180 tablet 0    apixaban ANTICOAGULANT (ELIQUIS) 5 MG tablet Take 1 tablet (5 mg) by mouth 2 times daily. 60 tablet 0    atorvastatin (LIPITOR) 40 MG tablet Take 1 tablet (40 mg) by mouth every evening      calcium carbonate (TUMS) 500 MG chewable tablet Take 1 tablet (500 mg) by mouth 4 times daily as needed for heartburn. 90 tablet 0    HYDROmorphone  (DILAUDID) 2 MG tablet Take 2 tablets (4 mg) by mouth every 4 hours as needed for breakthrough pain (please give 1 hour before dressing change every 5 days). 30 tablet 0    Insulin Aspart FlexPen 100 UNIT/ML SOPN Inject 5 Units subcutaneously 3 times daily (before meals).      insulin degludec (TRESIBA) 200 UNIT/ML pen Inject 34 Units subcutaneously every morning Hold for BG < 100      melatonin 1 MG TABS tablet Take 1 tablet (1 mg) by mouth nightly as needed for sleep      methadone (DOLOPHINE) 5 MG tablet Take 1 tablet (5 mg) by mouth 4 times daily. 42 tablet 0    metroNIDAZOLE (FLAGYL) 500 MG tablet Take 1 tablet (500 mg) by mouth 3 times daily. 42 tablet 0    miconazole (MICATIN) 2 % external cream Apply topically 2 times daily for 21 days. 35 g 1    mineral oil-hydrophilic petrolatum (AQUAPHOR) external ointment Apply to left thigh wound BID      ondansetron (ZOFRAN ODT) 4 MG ODT tab Take 1 tablet (4 mg) by mouth every 6 hours as needed for nausea or vomiting. 30 tablet 0    polyethylene glycol (MIRALAX) 17 g packet Take 17 g by mouth 2 times daily as needed for constipation. 30 packet 3    pregabalin (LYRICA) 25 MG capsule Take 1 capsule (25 mg) by mouth 2 times daily. 42 capsule 0    senna-docusate (SENOKOT-S/PERICOLACE) 8.6-50 MG tablet Take 1 tablet by mouth 2 times daily as needed for constipation. 60 tablet 0    sennosides (SENOKOT) 8.6 MG tablet Take 2 tablets by mouth 2 times daily. 180 tablet 0    torsemide (DEMADEX) 20 MG tablet Take 60 mg by mouth daily      wound support modular (EXPEDITE) LIQD bottle Take 60 mLs by mouth daily      Zinc oxide 10 % CREA Externally apply topically 3 times daily Apply to coccyx area once per shift after brief changes       No current facility-administered medications for this visit.         Tobacco History:  reports that she has never smoked. She has never used smokeless tobacco.       REVIEW OF SYMPTOMS:   The review of systems was negative except as noted in the  HPI.           PHYSICAL EXAMINATION:     /80   Pulse 91   SpO2 100%            GENERAL: The patient overall appears well and is no acute distress.   HEAD: normocephalic   EYES: Sclera and conjunctiva clear   NECK: no obvious masses   LUNGS: breathing is unlabored.   EXTREMITIES: No clubbing, cyanosis, edema   SKIN: No rashes or other abnormalities except as noted under the Wound section below.   NEUROLOGICAL: normal motor and sensory function          Also see below for wound details:     Circumferential volume measures:            9/9/2024     8:00 AM 9/30/2024     7:00 AM   Circumferential Measures   Right just above MTP 27.5 28.4   Right Ankle 32.8 33.5   Right Widest Calf 57 60         Impression:  Encounter Diagnoses   Name Primary?    Fungal infection Yes    Type 2 diabetes mellitus with complication, with long-term current use of insulin (H)     PAD (peripheral artery disease) (H)     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)     Dermatitis associated with incontinence       Wound location: Buttocks and intergluteal cleft        Last photo: 12/10  Wound due to: Incontinence Associated Dermatitis (IAD) and Moisture Associated Skin Damage (MASD) with fungal infection  Wound history/plan of care: incontinence of bowel and bladder  Wound base: 100 % blanchable  and epidermis, with couple of scattered partial thickness open areas and satellite lesions       2. Wound location: R) stump and posterior leg        12/10  Wound due to:Surgical    Wound base: 100 % granular tissue on R) posterior thigh [id number]     50/20/30%- pink nongranular tissue, bone, and fibrinous/slough  Palpation of the wound bed: normal      Drainage: moderate     Description of drainage: serosanguinous     Measurements (length x width x depth, in cm): 9  x 9  x  0.1 cm      Tunneling: N/A     Undermining: N/A  Periwound skin: moist and denuded      Color: pink      Temperature: normal   Odor: none  Pain: severe,  score 10/10, Irritable or crying out at intervals, tension to hands, feet and body, and facial expression of distress, shooting  Pain interventions prior to dressing change: topical lidocaine, soaking, slow and gentle cares , and distraction  STATUS: initial assessment      Ulceration(s)/Wound(s):   Please see the media tab under the chart review for pictures of the wounds.  Nursing staff removed dressings and cleansed wound.    VASC Wound Right posterior thigh (Active)   Pre Size Length 20 12/10/24 1200   Pre Size Width 11 12/10/24 1200   Pre Size Depth 0.1 12/10/24 1200   Pre Total Sq cm 220 12/10/24 1200       VASC Wound right BKA (Active)   Pre Size Length 9 12/10/24 1200   Pre Size Width 9 12/10/24 1200   Pre Size Depth 0.3 12/10/24 1200   Pre Total Sq cm 81 12/10/24 1200             Recent Labs   Lab Test 11/19/24  1003 11/12/24  1438 03/01/24  1319   A1C 8.4* 8.2* 9.9*          Recent Labs   Lab Test 11/15/24  0812 07/29/24  0732 07/28/24  1527   ALBUMIN 2.8* 2.5* 2.5*         WBC   Date Value Ref Range Status   02/22/2008 9.4 4.0 - 11.0 10e9/L Final     WBC Count   Date Value Ref Range Status   12/10/2024 7.3 4.0 - 11.0 10e3/uL Final     Albumin   Date Value Ref Range Status   11/15/2024 2.8 (L) 3.5 - 5.2 g/dL Final   02/22/2008 4.1 3.2 - 4.5 g/dL Final                ASSESSMENT:   This is a 79 year old  female with significant R) stump/thigh surgical wound. Extremely painful, did not receive any pain medication prior to leaving the facility.   Fungal infection on BL buttocks and intergluteal cleft in the setting of moisture associated skin damage.          60 minutes spent on the date of the encounter doing chart review, history and exam, documentation and further activities per the note, this time excludes any procedure time        MOE Fowler, FNP-C, CWOCN  12/10/2024   3:40 PM   [facility]  [phone]

## 2024-12-10 NOTE — PATIENT INSTRUCTIONS
"Wound care supplies were not ordered or needed today.        Wound Care Instructions    2 TIMES PER WEEK and as needed, Cleanse your R) posterior thigh wound(s) with Wound cleanser.    Pat Dry with non-sterile gauze    Apply Lotion to the intact skin surrounding your wound and other dry skin locations. Some good lotions include: Remedy Skin Repair Cream, Sarna, Vanicream or Cetaphil    Primary Dressing: Apply hydrogel on adaptic then into/onto the wounds    Secondary dressing: Cover with hydrofera blue, ABD     Secure with non-sterile roll gauze (4\" x 75\" roll) and tape (1\" roll tape) as needed; avoid adhesive directly on the skin    R) stump- continue with wound vac twice a week. Use white foam and black foam. Ensure she gets pain medication and pain is control prior to a change.    BL groin, perineum, inner thighs wound : BID for two weeks  Cleanse the area with Marisol cleanse and protect, very gently with soft cloth.  Apply BAZAantifungal twice a day. Use Triad paste in between if pt needs cleaning.  With repeat application, do not scrub the paste, only remove soiled paste and reapply.  If complete removal of paste is necessary use baby oil/mineral oil and soft wash cloth.  Ensure pt has cushion while sitting up in the chair.  Use only one incontinence pad in between mattress and pt. No diaper while in bed.     Continue with low air loss mattress.  Patient's pain needs to be control for wound changes.     It is not ok to get your wound wet in the bath or shower      If for some reason you are not able to get your dressing(s) changed as outlined above (due to illness, lack of supplies, lack of help) please do the following: remove old, soiled dressings; wash the wounds with saline; pat dry; apply ABD pad or other absorbant pad and secure with rolled gauze; avoid tape directly on your skin; Call the clinic as soon as possible to let us know what the current issues are in receiving wound care [phone].      SEEK " MEDICAL CARE IF:  You have an increase in swelling, pain, or redness around the wound.  You have an increase in the amount of pus coming from the wound.  There is a bad smell coming from the wound.  The wound appears to be worsening/enlarging  You have a fever greater than 101.5 F      It is ok to continue current wound care treatment/products for the next 2-3 days until new wound care supplies are ordered and arrive. If longer than this please contact our office at [phone].      Please NOTE: if you are 15 minutes late to your arrival time, you will have to be rescheduled. Please call our clinic as soon as possible if you know you will not be able to get to your appointment at [phone]. If you fail to show up to 3 scheduled clinic appointments you will be dismissed from our clinic.    We want to hear from you!  In the next few weeks, you should receive a call or email to complete a survey about your visit at [facility]. Please help us improve your appointment experience by letting us know how we did today. We strive to make your experience good and value any ways in which we could do better.      We value your input and suggestions.    Thank you for choosing the [facility]!     It is recommended that you do not get your ulcer wet when showering.  Listed below are several ways of keeping it dry when you shower.     1. Wrap it with Press and Seal plastic wrap.  It can be found in the stores where the plastic wraps or tin foil is kept.               2.  Some people take a bath and hang their leg/foot out of the tub.                        3  Put your leg in a plastic bag and tape it on.           4. You can purchase a shower cover for casts at some pharmacies and through the Internet.            5. Take a Bed Bath or wash up at the sink

## 2024-12-11 NOTE — TELEPHONE ENCOUNTER
Delivery Note:  Normal Spontaneous Vaginal Delivery    Delivery Information:        Information for the patient's :  [facility] [[id number]]   Birth Information  YOB: 2022  Time of birth: 3:55 PM  Delivering clinician: Eda Tejeda  Sex: male  Delivery type: Vaginal, Spontaneous  Presentation: Vertex  Gestational Age: 39w3d    APGARS:    One Minute: 9   Five Minutes: 9    Ten Minutes:          Vaginal Delivery Procedure    Cervical Ripening Type: None  Pitocin: Used for augmentation and Used postpartum     Anesthesia    Method: Epidural    NICU Staff Present at Delivery?: Yes    Labor Complications: None    Shoulder Dystocia Details:  Shoulder Dystocia Present:   None    Cord Information    Vessels:  3 Vessels    Complications: None    Delayed cord clamping? Yes    Blood gases sent? Yes    Stem cells collected by MD? No       Placenta Information  Delivered: 2022  3:59 PM   Removal: Spontaneous       Lacerations  Episiotomy:     Laceration Type Repaired?   Perineal: 1st Degree  Yes    Periurethral:       Labial:       Sulcus:       Vaginal:       Cervical:           Repair suture:     Total estimated blood loss (mL): 100        Uterus Explored: No    Antibiotics Received During Labor: None    Head presented in JANKI position.  No nuchal cord. Shoulders delivered atraumatically and body followed.  Bulb suction was used to suction the nose and mouth. Cord clamping was delayed >30 seconds. Infant was vigorous and placed on mom's chest. Placenta delivered spontaneously and intact. The 1st degree laceration was repaired with 3-0 vicryl  - one stich. No cervical or vaginal lacerations noted. Mom and baby stable.       Vaginal Counts:    Accurate Final Count? Yes        Review the Delivery Report for details     Attending: Dr. Tejeda  Resident: Tena Hayes DO  OB Service,  PGY-1      Addendum:  I agree with the above resident note.  I arrived just after the resident had  Left detailed message with infectious disease requesting call back or secure chat on if patient should continue I.V. antibiotics until amputation completion on 01/09/2025 with Dr. Sanches due to conflicting orders TCU has about when to stop them.   checked the patient and determined that she was completely dilated I said hello to the patient and her spouse.  The patient was resting comfortably in her bed and fetal heart rate tracing was reactive and reassuring.  I explained that I would go change and that we would proceed to set the patient up for delivery.  While I was changing the resident elevated the sheet and noticed that the patient was .  Patient was totally unaware.  With no pushing effort the infant was delivered spontaneously at the residents.  The  was vigorous and crying and placed on the maternal abdomen.  Dr. Pennington and  I arrived   shortly(less than 60seconds thereafter.  Eda Tejeda MD

## 2024-12-11 NOTE — TELEPHONE ENCOUNTER
Eleuterio with [facility] requesting further clarification on abx as he states he has conflicting orders. States there are notes/orders that say patient should stop using abx yesterday, then hospital orders that state to stop 12/25, and also to use through the 8th in preparation for 1/9 procedure.  PH: [phone] OK to MO

## 2024-12-12 ENCOUNTER — LAB REQUISITION (OUTPATIENT)
Dept: LAB | Facility: CLINIC | Age: 79
End: 2024-12-12
Payer: MEDICARE

## 2024-12-12 DIAGNOSIS — L08.9 LOCAL INFECTION OF THE SKIN AND SUBCUTANEOUS TISSUE, UNSPECIFIED: ICD-10-CM

## 2024-12-16 LAB
ANION GAP SERPL CALCULATED.3IONS-SCNC: 12 MMOL/L (ref 7–15)
BASOPHILS # BLD AUTO: 0.1 10E3/UL (ref 0–0.2)
BASOPHILS NFR BLD AUTO: 1 %
BUN SERPL-MCNC: 36.7 MG/DL (ref 8–23)
CALCIUM SERPL-MCNC: 10.7 MG/DL (ref 8.8–10.4)
CHLORIDE SERPL-SCNC: 96 MMOL/L (ref 98–107)
CREAT SERPL-MCNC: 0.84 MG/DL (ref 0.51–0.95)
CRP SERPL-MCNC: <3 MG/L
EGFRCR SERPLBLD CKD-EPI 2021: 70 ML/MIN/1.73M2
EOSINOPHIL # BLD AUTO: 0.4 10E3/UL (ref 0–0.7)
EOSINOPHIL NFR BLD AUTO: 5 %
ERYTHROCYTE [DISTWIDTH] IN BLOOD BY AUTOMATED COUNT: 16.5 % (ref 10–15)
GLUCOSE SERPL-MCNC: 239 MG/DL (ref 70–99)
HCO3 SERPL-SCNC: 31 MMOL/L (ref 22–29)
HCT VFR BLD AUTO: 37.9 % (ref 35–47)
HGB BLD-MCNC: 11 G/DL (ref 11.7–15.7)
IMM GRANULOCYTES # BLD: 0 10E3/UL
IMM GRANULOCYTES NFR BLD: 0 %
LYMPHOCYTES # BLD AUTO: 1.3 10E3/UL (ref 0.8–5.3)
LYMPHOCYTES NFR BLD AUTO: 20 %
MCH RBC QN AUTO: 27.1 PG (ref 26.5–33)
MCHC RBC AUTO-ENTMCNC: 29 G/DL (ref 31.5–36.5)
MCV RBC AUTO: 93 FL (ref 78–100)
MONOCYTES # BLD AUTO: 0.7 10E3/UL (ref 0–1.3)
MONOCYTES NFR BLD AUTO: 11 %
NEUTROPHILS # BLD AUTO: 4 10E3/UL (ref 1.6–8.3)
NEUTROPHILS NFR BLD AUTO: 63 %
NRBC # BLD AUTO: 0 10E3/UL
NRBC BLD AUTO-RTO: 0 /100
PLATELET # BLD AUTO: 222 10E3/UL (ref 150–450)
POTASSIUM SERPL-SCNC: 4.8 MMOL/L (ref 3.4–5.3)
RBC # BLD AUTO: 4.06 10E6/UL (ref 3.8–5.2)
SODIUM SERPL-SCNC: 139 MMOL/L (ref 135–145)
VANCOMYCIN SERPL-MCNC: 24.2 UG/ML
WBC # BLD AUTO: 6.4 10E3/UL (ref 4–11)

## 2024-12-16 PROCEDURE — 36415 COLL VENOUS BLD VENIPUNCTURE: CPT | Mod: ORL | Performed by: FAMILY MEDICINE

## 2024-12-16 PROCEDURE — 86140 C-REACTIVE PROTEIN: CPT | Mod: ORL | Performed by: FAMILY MEDICINE

## 2024-12-16 PROCEDURE — P9604 ONE-WAY ALLOW PRORATED TRIP: HCPCS | Mod: ORL | Performed by: FAMILY MEDICINE

## 2024-12-16 PROCEDURE — 85025 COMPLETE CBC W/AUTO DIFF WBC: CPT | Mod: ORL | Performed by: FAMILY MEDICINE

## 2024-12-16 PROCEDURE — 80048 BASIC METABOLIC PNL TOTAL CA: CPT | Mod: ORL | Performed by: FAMILY MEDICINE

## 2024-12-16 PROCEDURE — 80202 ASSAY OF VANCOMYCIN: CPT | Mod: ORL | Performed by: FAMILY MEDICINE

## 2024-12-19 ENCOUNTER — LAB REQUISITION (OUTPATIENT)
Dept: LAB | Facility: CLINIC | Age: 79
End: 2024-12-19
Payer: MEDICARE

## 2024-12-19 DIAGNOSIS — L08.9 LOCAL INFECTION OF THE SKIN AND SUBCUTANEOUS TISSUE, UNSPECIFIED: ICD-10-CM

## 2024-12-22 ENCOUNTER — LAB REQUISITION (OUTPATIENT)
Dept: LAB | Facility: CLINIC | Age: 79
End: 2024-12-22
Payer: MEDICARE

## 2024-12-22 DIAGNOSIS — I10 ESSENTIAL (PRIMARY) HYPERTENSION: ICD-10-CM

## 2024-12-22 DIAGNOSIS — D64.9 ANEMIA, UNSPECIFIED: ICD-10-CM

## 2024-12-23 ENCOUNTER — OFFICE VISIT (OUTPATIENT)
Dept: VASCULAR SURGERY | Facility: CLINIC | Age: 79
End: 2024-12-23
Attending: PHYSICIAN ASSISTANT
Payer: MEDICARE

## 2024-12-23 ENCOUNTER — LAB REQUISITION (OUTPATIENT)
Dept: LAB | Facility: CLINIC | Age: 79
End: 2024-12-23
Payer: MEDICARE

## 2024-12-23 VITALS
HEART RATE: 82 BPM | SYSTOLIC BLOOD PRESSURE: 128 MMHG | TEMPERATURE: 97.5 F | DIASTOLIC BLOOD PRESSURE: 76 MMHG | RESPIRATION RATE: 16 BRPM

## 2024-12-23 DIAGNOSIS — L08.9 LOCAL INFECTION OF THE SKIN AND SUBCUTANEOUS TISSUE, UNSPECIFIED: ICD-10-CM

## 2024-12-23 DIAGNOSIS — Z89.511 STATUS POST BELOW KNEE AMPUTATION OF RIGHT LOWER EXTREMITY (H): Primary | ICD-10-CM

## 2024-12-23 LAB
ANION GAP SERPL CALCULATED.3IONS-SCNC: 10 MMOL/L (ref 7–15)
BASOPHILS # BLD AUTO: 0 10E3/UL (ref 0–0.2)
BASOPHILS NFR BLD AUTO: 1 %
BUN SERPL-MCNC: 31.9 MG/DL (ref 8–23)
CALCIUM SERPL-MCNC: 10.9 MG/DL (ref 8.8–10.4)
CHLORIDE SERPL-SCNC: 96 MMOL/L (ref 98–107)
CREAT SERPL-MCNC: 0.8 MG/DL (ref 0.51–0.95)
CRP SERPL-MCNC: <3 MG/L
EGFRCR SERPLBLD CKD-EPI 2021: 75 ML/MIN/1.73M2
EOSINOPHIL # BLD AUTO: 0.4 10E3/UL (ref 0–0.7)
EOSINOPHIL NFR BLD AUTO: 5 %
ERYTHROCYTE [DISTWIDTH] IN BLOOD BY AUTOMATED COUNT: 16.3 % (ref 10–15)
GLUCOSE SERPL-MCNC: 118 MG/DL (ref 70–99)
HCO3 SERPL-SCNC: 35 MMOL/L (ref 22–29)
HCT VFR BLD AUTO: 39 % (ref 35–47)
HGB BLD-MCNC: 11.8 G/DL (ref 11.7–15.7)
IMM GRANULOCYTES # BLD: 0 10E3/UL
IMM GRANULOCYTES NFR BLD: 0 %
LYMPHOCYTES # BLD AUTO: 1 10E3/UL (ref 0.8–5.3)
LYMPHOCYTES NFR BLD AUTO: 12 %
MCH RBC QN AUTO: 27.8 PG (ref 26.5–33)
MCHC RBC AUTO-ENTMCNC: 30.3 G/DL (ref 31.5–36.5)
MCV RBC AUTO: 92 FL (ref 78–100)
MONOCYTES # BLD AUTO: 0.5 10E3/UL (ref 0–1.3)
MONOCYTES NFR BLD AUTO: 6 %
NEUTROPHILS # BLD AUTO: 6.4 10E3/UL (ref 1.6–8.3)
NEUTROPHILS NFR BLD AUTO: 77 %
NRBC # BLD AUTO: 0 10E3/UL
NRBC BLD AUTO-RTO: 0 /100
PLATELET # BLD AUTO: 228 10E3/UL (ref 150–450)
POTASSIUM SERPL-SCNC: 4.2 MMOL/L (ref 3.4–5.3)
RBC # BLD AUTO: 4.25 10E6/UL (ref 3.8–5.2)
SODIUM SERPL-SCNC: 141 MMOL/L (ref 135–145)
VANCOMYCIN SERPL-MCNC: 9.2 UG/ML
WBC # BLD AUTO: 8.4 10E3/UL (ref 4–11)

## 2024-12-23 PROCEDURE — 80048 BASIC METABOLIC PNL TOTAL CA: CPT | Mod: ORL | Performed by: FAMILY MEDICINE

## 2024-12-23 PROCEDURE — 86140 C-REACTIVE PROTEIN: CPT | Mod: ORL | Performed by: FAMILY MEDICINE

## 2024-12-23 PROCEDURE — 85025 COMPLETE CBC W/AUTO DIFF WBC: CPT | Mod: ORL | Performed by: FAMILY MEDICINE

## 2024-12-23 PROCEDURE — G0463 HOSPITAL OUTPT CLINIC VISIT: HCPCS | Performed by: PHYSICIAN ASSISTANT

## 2024-12-23 PROCEDURE — P9604 ONE-WAY ALLOW PRORATED TRIP: HCPCS | Mod: ORL | Performed by: FAMILY MEDICINE

## 2024-12-23 PROCEDURE — 80202 ASSAY OF VANCOMYCIN: CPT | Mod: ORL | Performed by: FAMILY MEDICINE

## 2024-12-23 PROCEDURE — 36415 COLL VENOUS BLD VENIPUNCTURE: CPT | Mod: ORL | Performed by: FAMILY MEDICINE

## 2024-12-23 ASSESSMENT — PAIN SCALES - GENERAL: PAINLEVEL_OUTOF10: MODERATE PAIN (4)

## 2024-12-23 NOTE — PROGRESS NOTES
VASCULAR SURGERY PROGRESS NOTE    LOCATION:  [facility]     Linda Loredo  Medical Record #: [id number]  YOB: 1945  Age: 79 year old     Date of Service: 12/23/2024    PRIMARY CARE PROVIDER: [facility], [facility]    Reason for visit: Wound check     IMPRESSION: 79-year-old female presenting for a right lower extremity wound check status post guillotine amputation. Guillotine site and posterior calf wounds appear healthy and without signs of infection.  She continues with local wound care to these areas. Continue to have significant pain with dressing changes, though slowly improving with time.    RECOMMENDATION/RISKS: Continue with local wound care as directed by our wound care team. Discussed options for further amputation via BKA closure if posterior calf wound continue to heal, tibia and fibula resection with partial closure followed skin grafting several months later, versus above knee amputation. Will cancel upcoming BKA formalization and re-evaluate in clinic in 2-3 weeks to discuss next steps.    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 She has been followed by our wound care team for assistance with both her guillotine site as well as chronic right calf wounds. Of note, for Ms. Awa Loredo's LLE she had a difficult time healing and she actually did not have a BKA formalization but rather a tibia and fibula resection with partial closure of tissue, with granulation tissue allowed to develop over wound and then skin grafting several months later, this was due to the significant edema of the wound and concerns regarding her skin integrity.     Today, Ms. Awa Loredo presents for follow up.  Her wounds appear healing and she denies any recent fevers or chills. She continues with local wound care with a VAC as directed by our wound care team. Patient continues to experience significant pain during dressing changes, tolerates well today with pre medication. She has previous et with our team who have discussed as length potential options for next surgical steps with consideration of her ongoing wounds, significant edema, pain, etc. All questions answered. No other concerns.    REVIEW OF SYSTEMS:    A 12 point ROS was reviewed and is negative except for what is listed above in HPI.    PHH:    Past Medical History:   Diagnosis Date    Abscess of left foot 10/31/2022    Acute cystitis without hematuria 07/11/2024    Acute kidney injury (H) 07/11/2024    Adverse effect of anticoagulants, initial encounter 04/16/2024    GUSTAVO (acute kidney injury) (H) 05/26/2023    Allergic rhinitis 04/08/2008    Anemia 07/11/2024    Anticoagulation monitoring, INR range 2-3 07/06/2022    Anxiety 10/20/2011    Cardiac murmur, unspecified 05/29/2019    Cellulitis - bilateral lower extremities 05/27/2023    Cellulitis of buttock 05/26/2023    Chronic atrial fibrillation (H) 05/30/2022    New onset during 5/2022 admission      Chronic kidney disease, stage 3b (H) 06/12/2024    Chronic pain 04/26/2010    Chronic right-sided heart failure (H) 11/16/2023    Constipation 04/16/2024    Decubitus ulcers - 5 present on buttocks/perineal region, numerous scabbed over and unstagable on shin and bilateral feet with some bloody blisters noted on feet 05/27/2023    Depressive disorder, not elsewhere classified     Diabetic foot ulcer (H) 09/29/2023    Diabetic polyneuropathy associated with type 2 diabetes mellitus (H) 04/07/2006 February 26, 2007: on gabapentin.  She has been switched from gabapentin to lyrica.       Elevated liver enzymes 10/20/2011    Essential hypertension with goal blood pressure less than 140/90 05/09/2005     Fibromyalgia 10/20/2011    Fracture of fibula, distal, left, closed 10/20/2011    ORIF 10/13/11      Herpes zoster 2005: On gabapentin and lidoderm. She has been switched from gabapentin to lyrica.       Herpes zoster without mention of complication     Hx of osteomyelitis 2024    S.p left BKA       Hypercalcemia 2007    Hypoglycemia 2023    Hypotension 10/27/2011    Insomnia 07/15/2005    Lymphedema, not elsewhere classified 2024    Major depressive disorder, recurrent episode, moderate (H) 2008    Metabolic encephalopathy 2024    Microalbuminuria due to type 2 diabetes mellitus (H) 10/25/2016    Mild intermittent asthma     Mixed hyperlipidemia 2005    Mixed hyperlipidemia due to type 2 diabetes mellitus (H) 2008    Formatting of this note is different from the original.     22 ASCVD 10 year risk: 37.9%      Last Lipids:  Last Lipids:  Chol: 2021 130   63  HDL: 2021 46  Non-HDL: 2021 84  Chol/HDL Ratio: 2021 2.83  LDL DIRECT: . No results found in past 5 years   LDL CHOLESTEROL (mg/dL)   Date Value   2021 71      22   The 10-year ASCVD risk score (Teddyjaney SMITH Jr.    Mixed stress and urge urinary incontinence 2005: On Detrol LA.      Non-healing wound of left heel 2023    Obesity with BMI >35 and comorbidity 2006: Body mass index is 48.07 kg/(m^2).       Opioid dependence, uncomplicated (H) 2023    BERLIN (obstructive sleep apnea) 10/23/2007    Sleep study 2004 reportedly showing severe OBSTRUCTIVE SLEEP APNEA and recommend CPAP, Not available for review. Patient is untreated       Osteoarthritis 2006    Osteomyelitis (H) 10/24/2023    Osteopenia 2015    Other abnormalities of gait and mobility 11/15/2023    Other urinary incontinence     Pressure injury of deep tissue of sacral region 2024    Primary  hyperparathyroidism (H) 01/23/2013    Work up January 2013 Dr. 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       Supratherapeutic INR 05/27/2023    Type 2 diabetes mellitus with complication, with long-term current use of insulin (H) 04/18/2005    diagnosed in 2001       Type II or unspecified type diabetes mellitus with renal manifestations, uncontrolled(250.42) (H)     Type II or unspecified type diabetes mellitus without mention of complication, not stated as uncontrolled     Umbilical hernia without obstruction and without gangrene 12/13/2018    Unspecified essential hypertension     Unspecified open wound, left foot, subsequent encounter 09/22/2023    UTI (urinary tract infection) - possible 05/26/2023    Venous stasis 04/16/2024    Vitamin D deficiency 09/08/2022    Warfarin-induced coagulopathy (H) 05/27/2023     Past Surgical History:   Procedure Laterality Date    AMPUTATE LEG BELOW KNEE Left 10/7/2023    Procedure: LEFT GUILLOTINE BELOW KNEE AMPUTATION.;  Surgeon: Lan Otto MD;  Location:  OR    AMPUTATE LEG BELOW KNEE Left 10/14/2023    Procedure: debridement of left below the knee amputation;  Surgeon: Lan Otto MD;  Location:  OR    AMPUTATION, LOWER EXTREMITY, USING GUILLOTINE TECHNIQUE Right 11/14/2024    Procedure: AMPUTATION, RIGHT LOWER EXTREMITY, USING GUILLOTINE TECHNIQUE;  Surgeon: Tierney Sanches MD;  Location: [facility]  Main OR    APPLY WOUND VAC Left 1/2/2024    Procedure: WOUND VAC APPLICATION TO LEFT BELOW KNEE STUMP;  Surgeon: Lan Otto MD;  Location: [facility] OR    CHOLECYSTECTOMY      GRAFT SKIN SPLIT THICKNESS FROM TRUNK TO HEAD Left 1/2/2024    Procedure: APPLICATION OF SPLIT-THICKNESS SKIN GRAFT TO LEFT BELOW KNEE STUMP, GRAFT FROM LEFT THIGH;  Surgeon: Lan Otto MD;  Location: [facility] OR    INCISION AND DRAINAGE FOOT, COMBINED Left 9/26/2023    Procedure: Surgical debridement of all nonviable skin and soft tissue and bone left foot;  Surgeon: Nolberto Thorne DPM;  Location: [facility] OR    IR LOWER EXTREMITY ANGIOGRAM LEFT  10/3/2023    IRRIGATION AND DEBRIDEMENT LOWER EXTREMITY, COMBINED Right 7/14/2024    Procedure: IRRIGATION AND DEBRIDEMENT, LOWER EXTREMITY, RIGHT LOWER LEG;  Surgeon: Phuong Gutierrez MD;  Location: [facility] OR    ligation of fallopian tube      OPEN REDUCTION INTERNAL FIXATION ANKLE  10/13/11    left    PICC SINGLE LUMEN PLACEMENT  11/18/2024    pinning of left 2nd toe       ALLERGIES:  Prednisone, Morphine, Morphine [fumaric acid], and Nitrofurantoin    MEDS:    Current Outpatient Medications:     acetaminophen (TYLENOL) 325 MG tablet, Take 3 tablets (975 mg) by mouth 3 times daily., Disp: 180 tablet, Rfl: 0    apixaban ANTICOAGULANT (ELIQUIS) 5 MG tablet, Take 1 tablet (5 mg) by mouth 2 times daily., Disp: 60 tablet, Rfl: 0    atorvastatin (LIPITOR) 40 MG tablet, Take 1 tablet (40 mg) by mouth every evening, Disp: , Rfl:     calcium carbonate (TUMS) 500 MG chewable tablet, Take 1 tablet (500 mg) by mouth 4 times daily as needed for heartburn., Disp: 90 tablet, Rfl: 0    HYDROmorphone (DILAUDID) 2 MG tablet, Take 2 tablets (4 mg) by mouth every 4 hours as needed for breakthrough pain (please give 1 hour before dressing change every 5 days)., Disp: 30 tablet, Rfl: 0    Insulin Aspart FlexPen 100 UNIT/ML SOPN, Inject 5 Units subcutaneously 3 times daily (before meals)., Disp: , Rfl:     insulin  degludec (TRESIBA) 200 UNIT/ML pen, Inject 34 Units subcutaneously every morning Hold for BG < 100, Disp: , Rfl:     melatonin 1 MG TABS tablet, Take 1 tablet (1 mg) by mouth nightly as needed for sleep, Disp: , Rfl:     methadone (DOLOPHINE) 5 MG tablet, Take 1 tablet (5 mg) by mouth 4 times daily., Disp: 42 tablet, Rfl: 0    metroNIDAZOLE (FLAGYL) 500 MG tablet, Take 1 tablet (500 mg) by mouth 3 times daily. (Patient not taking: Reported on 12/23/2024), Disp: 42 tablet, Rfl: 0    miconazole (MICATIN) 2 % external cream, Apply topically 2 times daily for 21 days., Disp: 35 g, Rfl: 1    mineral oil-hydrophilic petrolatum (AQUAPHOR) external ointment, Apply to left thigh wound BID, Disp: , Rfl:     ondansetron (ZOFRAN ODT) 4 MG ODT tab, Take 1 tablet (4 mg) by mouth every 6 hours as needed for nausea or vomiting., Disp: 30 tablet, Rfl: 0    polyethylene glycol (MIRALAX) 17 g packet, Take 17 g by mouth 2 times daily as needed for constipation., Disp: 30 packet, Rfl: 3    pregabalin (LYRICA) 25 MG capsule, Take 1 capsule (25 mg) by mouth 2 times daily., Disp: 42 capsule, Rfl: 0    senna-docusate (SENOKOT-S/PERICOLACE) 8.6-50 MG tablet, Take 1 tablet by mouth 2 times daily as needed for constipation., Disp: 60 tablet, Rfl: 0    sennosides (SENOKOT) 8.6 MG tablet, Take 2 tablets by mouth 2 times daily., Disp: 180 tablet, Rfl: 0    torsemide (DEMADEX) 20 MG tablet, Take 60 mg by mouth daily, Disp: , Rfl:     wound support modular (EXPEDITE) LIQD bottle, Take 60 mLs by mouth daily, Disp: , Rfl:     Zinc oxide 10 % CREA, Externally apply topically 3 times daily Apply to coccyx area once per shift after brief changes, Disp: , Rfl:     SOCIAL HABITS:    History   Smoking Status    Never   Smokeless Tobacco    Never     Social History    Substance and Sexual Activity      Alcohol use: No      History   Drug Use No     FAMILY HISTORY:    Family History   Problem Relation Age of Onset    Hypertension Mother     Heart Disease  Father         heart attack and cardiomegaly    Diabetes Sister         type 2    Hypertension Sister     Respiratory Sister     Psychotic Disorder Sister         bipolar    Cancer Maternal Grandmother         throat    Cancer Paternal Grandmother         gastric     PE:  /76   Pulse 82   Temp 97.5  F (36.4  C)   Resp 16   Wt Readings from Last 1 Encounters:   12/04/24 102.4 kg (225 lb 12 oz)     There is no height or weight on file to calculate BMI.    EXAM:  GENERAL: well-developed 79 year old female who appears her stated age  CARDIAC: normal   CHEST/LUNG: normal respiratory effort   MUSCULOSKELETAL: grossly normal and both lower extremities are intact, no lower extremity edema  NEUROLOGIC: focally intact, alert and oriented x 3  PSYCH: appropriate affect  VASCULAR: images uploaded to chart    DIAGNOSTIC STUDIES:     Images:  Pertinent imaging reviewed    LABS:      Sodium   Date Value Ref Range Status   12/23/2024 141 135 - 145 mmol/L Final   12/16/2024 139 135 - 145 mmol/L Final   12/10/2024 136 135 - 145 mmol/L Final   02/22/2008 140 133 - 144 mmol/L Final   10/15/2007 141 133 - 144 mmol/L Final   09/18/2007 142 133 - 144 mmol/L Final     Urea Nitrogen   Date Value Ref Range Status   12/23/2024 31.9 (H) 8.0 - 23.0 mg/dL Final   12/16/2024 36.7 (H) 8.0 - 23.0 mg/dL Final   12/10/2024 33.1 (H) 8.0 - 23.0 mg/dL Final   06/28/2022 42 (H) 8 - 28 mg/dL Final   02/22/2008 15 7 - 30 mg/dL Final   10/15/2007 17 7 - 30 mg/dL Final   09/18/2007 14 7 - 30 mg/dL Final     Hemoglobin   Date Value Ref Range Status   12/23/2024 11.8 11.7 - 15.7 g/dL Final   12/16/2024 11.0 (L) 11.7 - 15.7 g/dL Final   12/10/2024 11.1 (L) 11.7 - 15.7 g/dL Final   02/22/2008 12.6 11.7 - 15.7 g/dL Final   09/18/2007 14.3 11.7 - 15.7 g/dL Final   06/16/2006 13.1 11.7 - 15.7 g/dL Final     Platelet Count   Date Value Ref Range Status   12/23/2024 228 150 - 450 10e3/uL Final   12/16/2024 222 150 - 450 10e3/uL Final   12/10/2024 226 150 -  450 10e3/uL Final   02/22/2008 177 150 - 450 10e9/L Final   09/18/2007 220 150 - 450 10e9/L Final   06/16/2006 207 150 - 450 10e9/L Final     BNP   Date Value Ref Range Status   02/24/2006 21 5 - 100 pg/mL Final   12/07/2005 28 5 - 100 pg/mL Final     INR   Date Value Ref Range Status   11/13/2024 1.73 (H) 0.85 - 1.15 Final   11/12/2024 1.69 (H) 0.85 - 1.15 Final   08/07/2024 2.28 (H) 0.85 - 1.15 Final   06/16/2006 0.98 0.86 - 1.14 Final     INR (External)   Date Value Ref Range Status   07/25/2024 5.0 (A) 0.9 - 1.1 Final   07/22/2024 3.6  Final   07/17/2024 1.6 (A) 0.9 - 1.1 Final     30 minutes spent on the day of encounter doing chart review, history and exam, documentation, and further activities as noted.     Bettye Felipe PA-C  VASCULAR SURGERY

## 2024-12-27 ENCOUNTER — PREP FOR PROCEDURE (OUTPATIENT)
Dept: SURGERY | Facility: CLINIC | Age: 79
End: 2024-12-27
Payer: MEDICARE

## 2024-12-27 PROCEDURE — 88307 TISSUE EXAM BY PATHOLOGIST: CPT | Mod: 26 | Performed by: PATHOLOGY

## 2024-12-27 PROCEDURE — 88311 DECALCIFY TISSUE: CPT | Mod: 26 | Performed by: PATHOLOGY

## 2025-01-01 ENCOUNTER — LAB REQUISITION (OUTPATIENT)
Dept: LAB | Facility: CLINIC | Age: 80
End: 2025-01-01
Payer: COMMERCIAL

## 2025-01-01 DIAGNOSIS — D64.9 ANEMIA, UNSPECIFIED: ICD-10-CM

## 2025-01-01 DIAGNOSIS — I10 ESSENTIAL (PRIMARY) HYPERTENSION: ICD-10-CM

## 2025-01-02 ENCOUNTER — OFFICE VISIT (OUTPATIENT)
Dept: VASCULAR SURGERY | Facility: CLINIC | Age: 80
End: 2025-01-02
Payer: COMMERCIAL

## 2025-01-02 VITALS — SYSTOLIC BLOOD PRESSURE: 117 MMHG | DIASTOLIC BLOOD PRESSURE: 78 MMHG | OXYGEN SATURATION: 98 % | HEART RATE: 94 BPM

## 2025-01-02 DIAGNOSIS — R32 DERMATITIS ASSOCIATED WITH INCONTINENCE: ICD-10-CM

## 2025-01-02 DIAGNOSIS — B49 FUNGAL INFECTION: ICD-10-CM

## 2025-01-02 DIAGNOSIS — Z89.511 STATUS POST BELOW KNEE AMPUTATION OF RIGHT LOWER EXTREMITY (H): Primary | ICD-10-CM

## 2025-01-02 DIAGNOSIS — I73.9 PAD (PERIPHERAL ARTERY DISEASE) (H): ICD-10-CM

## 2025-01-02 DIAGNOSIS — Z89.512 STATUS POST BELOW-KNEE AMPUTATION OF LEFT LOWER EXTREMITY (H): ICD-10-CM

## 2025-01-02 DIAGNOSIS — L25.8 DERMATITIS ASSOCIATED WITH INCONTINENCE: ICD-10-CM

## 2025-01-02 LAB
ANION GAP SERPL CALCULATED.3IONS-SCNC: 14 MMOL/L (ref 7–15)
BUN SERPL-MCNC: 21.5 MG/DL (ref 8–23)
CALCIUM SERPL-MCNC: 10.5 MG/DL (ref 8.8–10.4)
CHLORIDE SERPL-SCNC: 95 MMOL/L (ref 98–107)
CREAT SERPL-MCNC: 0.72 MG/DL (ref 0.51–0.95)
EGFRCR SERPLBLD CKD-EPI 2021: 85 ML/MIN/1.73M2
ERYTHROCYTE [DISTWIDTH] IN BLOOD BY AUTOMATED COUNT: 17.2 % (ref 10–15)
GLUCOSE SERPL-MCNC: 218 MG/DL (ref 70–99)
HCO3 SERPL-SCNC: 31 MMOL/L (ref 22–29)
HCT VFR BLD AUTO: 38.7 % (ref 35–47)
HGB BLD-MCNC: 11.6 G/DL (ref 11.7–15.7)
MCH RBC QN AUTO: 27.9 PG (ref 26.5–33)
MCHC RBC AUTO-ENTMCNC: 30 G/DL (ref 31.5–36.5)
MCV RBC AUTO: 93 FL (ref 78–100)
PLATELET # BLD AUTO: 230 10E3/UL (ref 150–450)
POTASSIUM SERPL-SCNC: 4.2 MMOL/L (ref 3.4–5.3)
RBC # BLD AUTO: 4.16 10E6/UL (ref 3.8–5.2)
SODIUM SERPL-SCNC: 140 MMOL/L (ref 135–145)
WBC # BLD AUTO: 6.3 10E3/UL (ref 4–11)

## 2025-01-02 PROCEDURE — P9604 ONE-WAY ALLOW PRORATED TRIP: HCPCS | Mod: ORL | Performed by: FAMILY MEDICINE

## 2025-01-02 PROCEDURE — 11042 DBRDMT SUBQ TIS 1ST 20SQCM/<: CPT

## 2025-01-02 PROCEDURE — 36415 COLL VENOUS BLD VENIPUNCTURE: CPT | Mod: ORL | Performed by: FAMILY MEDICINE

## 2025-01-02 PROCEDURE — G0463 HOSPITAL OUTPT CLINIC VISIT: HCPCS

## 2025-01-02 PROCEDURE — 80048 BASIC METABOLIC PNL TOTAL CA: CPT | Mod: ORL | Performed by: FAMILY MEDICINE

## 2025-01-02 PROCEDURE — 85027 COMPLETE CBC AUTOMATED: CPT | Mod: ORL | Performed by: FAMILY MEDICINE

## 2025-01-02 RX ORDER — LIDOCAINE 50 MG/G
OINTMENT TOPICAL DAILY PRN
Status: ACTIVE | OUTPATIENT
Start: 2025-01-02

## 2025-01-02 RX ORDER — MICONAZOLE NITRATE 20 MG/G
CREAM TOPICAL 2 TIMES DAILY
Qty: 141.7 G | Refills: 2 | Status: SHIPPED | OUTPATIENT
Start: 2025-01-02 | End: 2025-01-16

## 2025-01-02 RX ADMIN — LIDOCAINE: 50 OINTMENT TOPICAL at 12:40

## 2025-01-02 NOTE — PROGRESS NOTES
Clinic Administered Medication Documentation    Patient was given Lidocaine 5% prior to wound debridement. Prior to medication administration, verified patient's identity using patient's name and date of birth.    Larissa Nascimento RN      Compression Applied to Right  Short Stretch

## 2025-01-02 NOTE — PATIENT INSTRUCTIONS
"Wound care supplies were not ordered or needed today.        Wound Care Instructions    3 TIMES PER WEEK and as needed, Cleanse your R) posterior thigh wound(s) with Wound cleanser.    Pat Dry with non-sterile gauze    Apply Lotion to the intact skin surrounding your wound and other dry skin locations. Some good lotions include: Remedy Skin Repair Cream, Sarna, Vanicream or Cetaphil    Primary Dressing: Apply hydrogel on adaptic then into/onto the wounds    Secondary dressing: Cover with hydrofera blue, ABD     Secure with non-sterile roll gauze (4\" x 75\" roll) and tape (1\" roll tape) as needed; avoid adhesive directly on the skin    R) stump- continue with wound vac twice a week. Use white foam and black foam. Ensure she gets pain medication and pain is control prior to a change.      BL groin, perineum, inner thighs wound : BID for two weeks  Cleanse the area with Marisol cleanse and protect, very gently with soft cloth.  Apply BAZAantifungal twice a day. Use Triad paste in between if pt needs cleaning.  With repeat application, do not scrub the paste, only remove soiled paste and reapply.  If complete removal of paste is necessary use baby oil/mineral oil and soft wash cloth.  Ensure pt has cushion while sitting up in the chair.  Use only one incontinence pad in between mattress and pt. No diaper while in bed.     Continue with low air loss mattress.    Patient's pain needs to be control for wound changes.     It is not ok to get your wound wet in the bath or shower      If for some reason you are not able to get your dressing(s) changed as outlined above (due to illness, lack of supplies, lack of help) please do the following: remove old, soiled dressings; wash the wounds with saline; pat dry; apply ABD pad or other absorbant pad and secure with rolled gauze; avoid tape directly on your skin; Call the clinic as soon as possible to let us know what the current issues are in receiving wound care " [phone].      SEEK MEDICAL CARE IF:  You have an increase in swelling, pain, or redness around the wound.  You have an increase in the amount of pus coming from the wound.  There is a bad smell coming from the wound.  The wound appears to be worsening/enlarging  You have a fever greater than 101.5 F      It is ok to continue current wound care treatment/products for the next 2-3 days until new wound care supplies are ordered and arrive. If longer than this please contact our office at [phone].      Please NOTE: if you are 15 minutes late to your arrival time, you will have to be rescheduled. Please call our clinic as soon as possible if you know you will not be able to get to your appointment at [phone]. If you fail to show up to 3 scheduled clinic appointments you will be dismissed from our clinic.    We want to hear from you!  In the next few weeks, you should receive a call or email to complete a survey about your visit at [facility]. Please help us improve your appointment experience by letting us know how we did today. We strive to make your experience good and value any ways in which we could do better.      We value your input and suggestions.    Thank you for choosing the [facility]!     It is recommended that you do not get your ulcer wet when showering.  Listed below are several ways of keeping it dry when you shower.     1. Wrap it with Press and Seal plastic wrap.  It can be found in the stores where the plastic wraps or tin foil is kept.               2.  Some people take a bath and hang their leg/foot out of the tub.                        3  Put your leg in a plastic bag and tape it on.           4. You can purchase a shower cover for casts at some pharmacies and through the Internet.            5. Take a Bed Bath or wash up at the sink

## 2025-01-02 NOTE — PROGRESS NOTES
Wound Clinic Note          Visit date: 01/02/2025       Isabella Complaint:     Linda Loredo is a 79 year old  female had concerns including Wound Check.      Treatment Plan:    1. Treatment:  wound treatment will include irrigation and dressings to promote autolytic debridement which will include: R) stump- continue with wound vac twice a week with white foam and black foam. Pt needs to be premedicated prior to the appointment for dressing changes. Recommend to turn off the vac completely before leaving the facility to allow easy removal.   Needs to be premedicated.    R) posterior leg- NS, hydrogel, adaptic, hydrofera blue, ABD and rolled gauze change 3 times a week    BL buttocks, thigh, intergluteal cleft- WOJCIECH antifungal for 2 weeks and triad paste inbetween. They have had hard time getting the antifungal cream.    If for some reason the patient is not able to get your dressing(s) changed as outlined above (due to illness, lack of supplies, lack of help) please do the following: remove old, soiled dressings; wash the ulcers with saline; pat dry; apply ABD pad or other absorbant pad and secure with rolled gauze; avoid tape directly on your skin; Patient instructed to call the clinic as soon as possible to let us know what the current issues are in receiving ulcer care.    2. Edema. Elevate your legs above your heart for 20-30 minutes twice daily.     3. Compression- continue with short stretch    4. Offloading: no direct pressure over the wound. Continue with air mattress, only one layer between mattress and pt.    5. Nutrition: Focus on Protein diet unless on renal diet. . Monitor blood glucose and diabetic diet. Continue with supplement.    6. Imaging: NA    7. Referral- continue to follow up with Dr. Quezada. AKA scheduled on 1/9. Pt and daughter has more questions about the surgery, sent message to Bettye UGARTE.     8. Wound Etiology: R) stump - Surgical, BL buttocks intergluteal cleft- fungal  infection in the setting of moisture associated skin damage     R) posterior thigh- venous stasis ulcer.    9.  Education: importance of air mattress, keeping the skin dry, pain management, repositioning, moisture management, blood glucose management.    I have explained to the patient the importance of protein intake to wound healing.  I have explained that increasing protein intake will speed wound healing.  We discussed several types of food that are high in protein and the wound care nurse gave the patient a handout that summarizes this information.  In addition to further speed wound healing I have encouraged the patient to take a protein supplement.     Patient to return to clinic PRN for re-evaluation. They were instructed to call the clinic sooner with any further questions or concerns. Answered all questions.      HISTORY OF PRESENT ILLNESS:    Linda is being seen at [facility] today regarding BL amputation site. They arrive to the clinic today with her daughter. The patient reports that the ulcer has been present since 11/2024.  The wound began after a recent surgery and the incision did not heal normally.   Was currently/previously using wound vac on R) stump and hydrofera blue on posterior leg. There has been Moderate drainage from the wound. Pain has slightly improved compared to before. Currently using oral dilaudid for pain. Has currently using short stretch as compression. Denies any fevers, chills, or generalized ill feeling. Continue to sleeps in a air mattress bed with multiple layer of incontinence pads.    Pt reports urinary incontinence if mostly due to urgency and not having staff assistance immediately to use the bathroom. Tries to keep skin dry by leaving the brief open at night. Dressing changes gets complicated due to pain and not having same staffs with similar skill sets. Also had issue with getting antifungal cream.    Venous stasis ulcer    Right calf- Chronic ulcer,  which is on her , has been present for at least 1 year and 3 months. An excisional debridement of the area was performed in July 2024.    [facility] Wound vac has been only changed every 5 days.    11/14- right below the knee amputation guillotine by Dr. Mansfield. Next surgery scheduled on 1/9 for AKA.        Previous imagings: NA    Problem List:   Past Medical History:   Diagnosis Date    Abscess of left foot 10/31/2022    Acute cystitis without hematuria 07/11/2024    Acute kidney injury (H) 07/11/2024    Adverse effect of anticoagulants, initial encounter 04/16/2024    GUSTAVO (acute kidney injury) (H) 05/26/2023    Allergic rhinitis 04/08/2008    Anemia 07/11/2024    Anticoagulation monitoring, INR range 2-3 07/06/2022    Anxiety 10/20/2011    Cardiac murmur, unspecified 05/29/2019    Cellulitis - bilateral lower extremities 05/27/2023    Cellulitis of buttock 05/26/2023    Chronic atrial fibrillation (H) 05/30/2022    New onset during 5/2022 admission      Chronic kidney disease, stage 3b (H) 06/12/2024    Chronic pain 04/26/2010    Chronic right-sided heart failure (H) 11/16/2023    Constipation 04/16/2024    Decubitus ulcers - 5 present on buttocks/perineal region, numerous scabbed over and unstagable on shin and bilateral feet with some bloody blisters noted on feet 05/27/2023    Depressive disorder, not elsewhere classified     Diabetic foot ulcer (H) 09/29/2023    Diabetic polyneuropathy associated with type 2 diabetes mellitus (H) 04/07/2006 February 26, 2007: on gabapentin.  She has been switched from gabapentin to lyrica.       Elevated liver enzymes 10/20/2011    Essential hypertension with goal blood pressure less than 140/90 05/09/2005    Fibromyalgia 10/20/2011    Fracture of fibula, distal, left, closed 10/20/2011    ORIF 10/13/11      Herpes zoster 06/20/2005 February 26, 2007: On gabapentin and lidoderm. She has been switched from gabapentin to lyrica.        Herpes zoster without mention of complication     Hx of osteomyelitis 2024    S.p left BKA       Hypercalcemia 2007    Hypoglycemia 2023    Hypotension 10/27/2011    Insomnia 07/15/2005    Lymphedema, not elsewhere classified 2024    Major depressive disorder, recurrent episode, moderate (H) 2008    Metabolic encephalopathy 2024    Microalbuminuria due to type 2 diabetes mellitus (H) 10/25/2016    Mild intermittent asthma     Mixed hyperlipidemia 2005    Mixed hyperlipidemia due to type 2 diabetes mellitus (H) 2008    Formatting of this note is different from the original.     22 ASCVD 10 year risk: 37.9%      Last Lipids:  Last Lipids:  Chol: 2021 130   63  HDL: 2021 46  Non-HDL: 2021 84  Chol/HDL Ratio: 2021 2.83  LDL DIRECT: . No results found in past 5 years   LDL CHOLESTEROL (mg/dL)   Date Value   2021 71      22   The 10-year ASCVD risk score (Davidsonjaney SMITH Jr.    Mixed stress and urge urinary incontinence 2005: On Detrol LA.      Non-healing wound of left heel 2023    Obesity with BMI >35 and comorbidity 2006: Body mass index is 48.07 kg/(m^2).       Opioid dependence, uncomplicated (H) 2023    BERLIN (obstructive sleep apnea) 10/23/2007    Sleep study 2004 reportedly showing severe OBSTRUCTIVE SLEEP APNEA and recommend CPAP, Not available for review. Patient is untreated       Osteoarthritis 2006    Osteomyelitis (H) 10/24/2023    Osteopenia 2015    Other abnormalities of gait and mobility 11/15/2023    Other urinary incontinence     Pressure injury of deep tissue of sacral region 2024    Primary hyperparathyroidism (H) 2013    Work up 2013 Dr. 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       Supratherapeutic INR 05/27/2023    Type 2 diabetes mellitus with complication, with long-term current use of insulin (H) 04/18/2005    diagnosed in 2001       Type II or unspecified type diabetes mellitus with renal manifestations, uncontrolled(250.42) (H)     Type II or unspecified type diabetes mellitus without mention of complication, not stated as uncontrolled     Umbilical hernia without obstruction and without gangrene 12/13/2018    Unspecified essential hypertension     Unspecified open wound, left foot, subsequent encounter 09/22/2023    UTI (urinary tract infection) - possible 05/26/2023    Venous stasis 04/16/2024    Vitamin D deficiency 09/08/2022    Warfarin-induced coagulopathy (H) 05/27/2023              Family Hx: family history includes Cancer in her maternal grandmother and paternal grandmother; Diabetes in her sister; Heart Disease in her father; Hypertension in her mother and sister; Psychotic Disorder in her sister; Respiratory in her sister.       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    Procedure: LEFT GUILLOTINE BELOW KNEE AMPUTATION.;  Surgeon: Lan Otto MD;  Location: [facility] OR    AMPUTATE LEG BELOW KNEE Left 10/14/2023    Procedure: debridement of left below the knee amputation;  Surgeon: Lan Otto MD;  Location: [facility] OR    AMPUTATION, LOWER EXTREMITY, USING GUILLOTINE TECHNIQUE Right 11/14/2024    Procedure: AMPUTATION, RIGHT LOWER EXTREMITY, USING GUILLOTINE TECHNIQUE;  Surgeon: Myron  Tierney Vázquez MD;  Location: [facility] OR    APPLY WOUND VAC Left 1/2/2024    Procedure: WOUND VAC APPLICATION TO LEFT BELOW KNEE STUMP;  Surgeon: Lan Otto MD;  Location: [facility] OR    CHOLECYSTECTOMY      GRAFT SKIN SPLIT THICKNESS FROM TRUNK TO HEAD Left 1/2/2024    Procedure: APPLICATION OF SPLIT-THICKNESS SKIN GRAFT TO LEFT BELOW KNEE STUMP, GRAFT FROM LEFT THIGH;  Surgeon: Lan Otto MD;  Location: [facility] OR    INCISION AND DRAINAGE FOOT, COMBINED Left 9/26/2023    Procedure: Surgical debridement of all nonviable skin and soft tissue and bone left foot;  Surgeon: Nolberto Thorne DPM;  Location: [facility] OR    IR LOWER EXTREMITY ANGIOGRAM LEFT  10/3/2023    IRRIGATION AND DEBRIDEMENT LOWER EXTREMITY, COMBINED Right 7/14/2024    Procedure: IRRIGATION AND DEBRIDEMENT, LOWER EXTREMITY, RIGHT LOWER LEG;  Surgeon: Phuong Gutierrez MD;  Location: [facility] OR    ligation of fallopian tube      OPEN REDUCTION INTERNAL FIXATION ANKLE  10/13/11    left    PICC SINGLE LUMEN PLACEMENT  11/18/2024    pinning of left 2nd toe            Allergies:    Allergies   Allergen Reactions    Prednisone Nausea and Vomiting    Morphine Nausea and Vomiting    Morphine [Fumaric Acid] Nausea and Vomiting    Nitrofurantoin Unknown     Per nursing home              Medication History:    Current Outpatient Medications   Medication Sig Dispense Refill    acetaminophen (TYLENOL) 325 MG tablet Take 3 tablets (975 mg) by mouth 3 times daily. 180 tablet 0    apixaban ANTICOAGULANT (ELIQUIS) 5 MG tablet Take 1 tablet (5 mg) by mouth 2 times daily. 60 tablet 0    atorvastatin (LIPITOR) 40 MG tablet Take 1 tablet (40 mg) by mouth every evening      calcium carbonate (TUMS) 500 MG chewable tablet Take 1 tablet (500 mg) by mouth 4 times daily as needed for heartburn. 90 tablet 0    HYDROmorphone (DILAUDID) 2 MG tablet Take 2 tablets (4 mg) by mouth every 4 hours as needed for breakthrough pain (please give 1 hour before dressing  change every 5 days). 30 tablet 0    Insulin Aspart FlexPen 100 UNIT/ML SOPN Inject 5 Units subcutaneously 3 times daily (before meals).      insulin degludec (TRESIBA) 200 UNIT/ML pen Inject 34 Units subcutaneously every morning Hold for BG < 100      melatonin 1 MG TABS tablet Take 1 tablet (1 mg) by mouth nightly as needed for sleep      methadone (DOLOPHINE) 5 MG tablet Take 1 tablet (5 mg) by mouth 4 times daily. 42 tablet 0    miconazole with skin protectant (WOJCIECH ANTIFUNGAL) 2 % CREA cream Apply topically 2 times daily for 14 days. 141.7 g 2    mineral oil-hydrophilic petrolatum (AQUAPHOR) external ointment Apply to left thigh wound BID      ondansetron (ZOFRAN ODT) 4 MG ODT tab Take 1 tablet (4 mg) by mouth every 6 hours as needed for nausea or vomiting. 30 tablet 0    polyethylene glycol (MIRALAX) 17 g packet Take 17 g by mouth 2 times daily as needed for constipation. 30 packet 3    pregabalin (LYRICA) 25 MG capsule Take 1 capsule (25 mg) by mouth 2 times daily. 42 capsule 0    senna-docusate (SENOKOT-S/PERICOLACE) 8.6-50 MG tablet Take 1 tablet by mouth 2 times daily as needed for constipation. 60 tablet 0    sennosides (SENOKOT) 8.6 MG tablet Take 2 tablets by mouth 2 times daily. 180 tablet 0    torsemide (DEMADEX) 20 MG tablet Take 60 mg by mouth daily      wound support modular (EXPEDITE) LIQD bottle Take 60 mLs by mouth daily      Zinc oxide 10 % CREA Externally apply topically 3 times daily Apply to coccyx area once per shift after brief changes      metroNIDAZOLE (FLAGYL) 500 MG tablet Take 1 tablet (500 mg) by mouth 3 times daily. (Patient not taking: Reported on 1/2/2025) 42 tablet 0     Current Facility-Administered Medications   Medication Dose Route Frequency Provider Last Rate Last Admin    lidocaine (XYLOCAINE) 5 % ointment   Topical Daily PRN Rosario Rodgers APRN CNP   Given at 01/02/25 1240         Tobacco History:  reports that she has never smoked. She has never used smokeless  tobacco.       REVIEW OF SYMPTOMS:   The review of systems was negative except as noted in the HPI.           PHYSICAL EXAMINATION:     /78   Pulse 94   SpO2 98%            GENERAL: The patient overall appears well and is no acute distress.   HEAD: normocephalic   EYES: Sclera and conjunctiva clear   NECK: no obvious masses   LUNGS: breathing is unlabored.   EXTREMITIES: No clubbing, cyanosis, edema   SKIN: No rashes or other abnormalities except as noted under the Wound section below.   NEUROLOGICAL: normal motor and sensory function          Also see below for wound details:     Circumferential volume measures:            9/9/2024     8:00 AM 9/30/2024     7:00 AM   Circumferential Measures   Right just above MTP 27.5 28.4   Right Ankle 32.8 33.5   Right Widest Calf 57 60         Impression:  Encounter Diagnoses   Name Primary?    Status post below knee amputation of right lower extremity (H) Yes    Fungal infection     PAD (peripheral artery disease) (H)     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)     Dermatitis associated with incontinence         Wound location: Buttocks and intergluteal cleft          12/10                                                                         1/2  Last photo: 1/2  Wound due to: Incontinence Associated Dermatitis (IAD) and Moisture Associated Skin Damage (MASD) with fungal infection  Wound history/plan of care: incontinence of bowel and bladder  Wound base: 100 % blanchable  and epidermis, with couple of scattered recently healed partial thickness open areas and satellite lesions       2. Wound location: R) stump        12/10                                                                 1/2                                                                   Wound due to:Surgical    Wound base: 50/30/20%- red granular tissue, bone, and fibrinous/slough  Palpation of the wound bed: normal      Drainage: moderate     Description of drainage:  serosanguinous     Measurements (length x width x depth, in cm): 9  x 9.5  x  0.8 cm      Tunneling: N/A     Undermining: N/A  Periwound skin: moist      Color: pink      Temperature: normal   Odor: none        Wound location: posterior leg      12/10                                                                     1/2  Last photo: 1/2  Wound due to: Surgical Wound  Wound history/plan of care: Chronic has been present for more than 6 months.  Wound base: 100 % Granulation tissue,     Palpation of the wound bed: normal      Drainage: moderate     Description of drainage: serosanguinous     Measurements (length x width x depth, in cm): 17 x 10 x 0.1 cm      Tunneling: N/A     Undermining: N/A  Periwound skin: Intact      Color: pink      Temperature: normal   Odor: none  Pain: severe, score 5/10, tension to hands, feet and body, and facial expression of distress, shooting  Pain interventions prior to dressing change: topical lidocaine, soaking, slow and gentle cares , and distraction      Ulceration(s)/Wound(s):   Please see the media tab under the chart review for pictures of the wounds.  Nursing staff removed dressings and cleansed wound.    VASC Wound Right posterior thigh (Active)   Pre Size Length 17 01/02/25 1200   Pre Size Width 10 01/02/25 1200   Pre Size Depth 0.1 01/02/25 1200   Pre Total Sq cm 170 01/02/25 1200       VASC Wound right BKA (Active)   Pre Size Length 9 01/02/25 1200   Pre Size Width 9.5 01/02/25 1200   Pre Size Depth 0.8 01/02/25 1200   Pre Total Sq cm 85.5 01/02/25 1200               Recent Labs   Lab Test 11/19/24  1003 11/12/24  1438 03/01/24  1319   A1C 8.4* 8.2* 9.9*          Recent Labs   Lab Test 11/15/24  0812 07/29/24  0732 07/28/24  1527   ALBUMIN 2.8* 2.5* 2.5*         WBC   Date Value Ref Range Status   02/22/2008 9.4 4.0 - 11.0 10e9/L Final     WBC Count   Date Value Ref Range Status   01/02/2025 6.3 4.0 - 11.0 10e3/uL Final     Albumin   Date Value Ref Range Status    11/15/2024 2.8 (L) 3.5 - 5.2 g/dL Final   02/22/2008 4.1 3.2 - 4.5 g/dL Final                ASSESSMENT:     This is a 79 year old  female with significant R) stump surgical wound. R) posterior leg venous stasis ulcer. Extremely painful, slightly better than last visit.  Fungal infection on BL buttocks and intergluteal cleft in the setting of moisture associated skin damage.        1. Debridement: Nursing staff remove the old dressing and cleanse the wound and surrounding skin with recommended solution. After discussion of risk factors and verbal consent was obtained 2% Lidocaine HCL jelly was applied, under clean conditions, the R) stump ulceration(s) were debrided using currette. Devitalized and nonviable tissue, along with any fibrin and slough, was removed to improve granulation tissue formation, stimulate wound healing, decrease overall bacteria load, disrupt biofilm formation and decrease edge senescence.  Total excisional debridement was 20 sq cm into the subcutaneous tissue with a depth of 0.3 cm.   Ulcers were improved afterwards and .  Measures were as noted on the flow sheet. Only part  of the wounds were debrided.      MOE Fowler, FNP-C, CWOCN  01/02/2025   2:11 PM   [facility]  [phone]

## 2025-01-02 NOTE — PLAN OF CARE
1/2/2025: Based on the severity of his myelofibrosis, workup for stem cell transplant is probably the most appropriate step - however, I am concern that he may have significant barriers to successful transplant, including his comorbid conditions, as well as potential social barriers.      He has an appointment with a primary program next week, I noted that today he actually arrived wearing oxygen, which I think is a significant concern if he actually needs this on a daily basis.  Plan:  -To see Dr. Vargas next week   Pt is AOx4, anxious and uncooperative at times, CMS intact, L popliteal pulse +1, neuropathy baseline, pain is managed with dilaudid, refuses repositioning at times, edu provided, heparin running, assist x2 with lift, pt requested purewick when voiding, dressing LLE intact, PICC patent, bariatric BSC ordered. ADLs encouraged. Pt sleeping in between cares, plan for dressing change, and pending completion of BKA.

## 2025-01-03 ENCOUNTER — MEDICAL CORRESPONDENCE (OUTPATIENT)
Dept: HEALTH INFORMATION MANAGEMENT | Facility: CLINIC | Age: 80
End: 2025-01-03
Payer: COMMERCIAL

## 2025-01-06 ENCOUNTER — TELEPHONE (OUTPATIENT)
Dept: VASCULAR SURGERY | Facility: CLINIC | Age: 80
End: 2025-01-06
Payer: COMMERCIAL

## 2025-01-06 DIAGNOSIS — R29.898 OTHER SYMPTOMS AND SIGNS INVOLVING THE MUSCULOSKELETAL SYSTEM: ICD-10-CM

## 2025-01-06 DIAGNOSIS — R19.09 RIGHT GROIN MASS: Primary | ICD-10-CM

## 2025-01-06 NOTE — TELEPHONE ENCOUNTER
LB LM with patient- Per Dr. ZAMORA would like to postpone surgery and need US/in-clinic follow up with Dr. Sanches. [phone]  _________________________________________  Blanquita Knight RN Radzwon, Emily; [facility];  Vascular Surgery Schedulers [facility]  Cc: Trini Santos, RN  So Dr. Sanches wants to post pone Thursday's surgery, and see patient in clinic to talk about surgery and ultrasound of right groin mass, can you please help her schedule? The nurse manager said her daughter schedules the appointments, I know she is dealing with car break down so she might not be currently available

## 2025-01-06 NOTE — TELEPHONE ENCOUNTER
Multiple phone calls with patient and her facility's nurse manager Eleuterio today.     In October, patient had a venous ultrasound ordered by Dr. Morley which showed a mass in the right groin. Dr. Morley's note from 10/31 states he discussed with patient that she may need to have CT scan and have the area biopsied, but patient declined at that time.    On 11/14, patient had saritha YOUNGA with Dr. Sanches, and saw him in clinic for follow up on 11/25. Note states per patient preference, completion to be done in January per patient request.     Completion surgery was cancelled to allow for further healing of right calf wound after patient saw Bettye Felipe in clinic on 12/23 and scheduled to see Dr. Sanches on 1/13 to be re-evaluated and talk about the next steps.     On 12/26, facility nurse manager Eleuterio called the clinic and spoke with triage nurse who relayed to Dr. Sanches that patient would like to schedule an AKA instead. Dr. Sanches placed the orders.    12/30, message from the nurse who spoke with Eleuterio stated that patient wanted to move forward with the surgery rather than waiting until 1/13 to see Dr. Sanches in clinic. AKA surgery was scheduled for 1/9.     Patient then saw Rosario for wound care appointment on 1/2. Patient and daughter had many questions about the surgery with Dr. Sanches, and were informed that he was on vacation until 1/6.    The morning of 1/6, writer called patient and added her on Dr. Sanches's schedule for a telephone visit to discuss her concerns. Patient had to cancel due to her daughter not being able to be present due car problems.    Nurse manager then called and spoke with writer with update of the patient and daughters questions:  When will groin mass be addressed?  Instead of AKA, can a BKA be done using intact skin or to preserve the end of the femur?    Writer discussed the groin mass with Dr. Sanches, who requested that AKA surgery be postponed, and  ultrasound be ordered and he see patient in clinic to discuss next steps.    Writer called back to inform Eleuterio, and left message on patient's phone.

## 2025-01-06 NOTE — TELEPHONE ENCOUNTER
----- Message from Courtney HARPER sent at 1/6/2025 11:32 AM CST -----  Regarding: FW: reschedule to tomorrow  Are we still calling to r/s this? I know you had just talked w the facility  ----- Message -----  From: Blanquita Knight RN  Sent: 1/6/2025  11:10 AM CST  To: Trini Santos RN; #  Subject: reschedule to tomorrow                           HiDr. Sanches would like her phone visit rescheduled to tomorrow instead of this afternoon. She is scheduled for aka on Thursday with Dr. ZAMORA but has quesitons

## 2025-01-13 ENCOUNTER — TELEPHONE (OUTPATIENT)
Dept: VASCULAR SURGERY | Facility: CLINIC | Age: 80
End: 2025-01-13
Payer: COMMERCIAL

## 2025-01-13 ENCOUNTER — DOCUMENTATION ONLY (OUTPATIENT)
Dept: ORTHOPEDICS | Facility: CLINIC | Age: 80
End: 2025-01-13

## 2025-01-13 ENCOUNTER — ANCILLARY PROCEDURE (OUTPATIENT)
Dept: VASCULAR ULTRASOUND | Facility: CLINIC | Age: 80
End: 2025-01-13
Attending: SURGERY
Payer: COMMERCIAL

## 2025-01-13 ENCOUNTER — OFFICE VISIT (OUTPATIENT)
Dept: VASCULAR SURGERY | Facility: CLINIC | Age: 80
End: 2025-01-13
Attending: SURGERY
Payer: COMMERCIAL

## 2025-01-13 VITALS — RESPIRATION RATE: 16 BRPM | HEART RATE: 70 BPM | SYSTOLIC BLOOD PRESSURE: 127 MMHG | DIASTOLIC BLOOD PRESSURE: 64 MMHG

## 2025-01-13 DIAGNOSIS — E11.621 DIABETIC ULCER OF OTHER PART OF RIGHT FOOT ASSOCIATED WITH TYPE 2 DIABETES MELLITUS, WITH NECROSIS OF BONE (H): Primary | ICD-10-CM

## 2025-01-13 DIAGNOSIS — R19.09 RIGHT GROIN MASS: ICD-10-CM

## 2025-01-13 DIAGNOSIS — L97.514 DIABETIC ULCER OF OTHER PART OF RIGHT FOOT ASSOCIATED WITH TYPE 2 DIABETES MELLITUS, WITH NECROSIS OF BONE (H): Primary | ICD-10-CM

## 2025-01-13 DIAGNOSIS — R29.898 OTHER SYMPTOMS AND SIGNS INVOLVING THE MUSCULOSKELETAL SYSTEM: ICD-10-CM

## 2025-01-13 PROCEDURE — 76882 US LMTD JT/FCL EVL NVASC XTR: CPT | Mod: RT

## 2025-01-13 PROCEDURE — G0463 HOSPITAL OUTPT CLINIC VISIT: HCPCS | Performed by: SURGERY

## 2025-01-13 PROCEDURE — 76882 US LMTD JT/FCL EVL NVASC XTR: CPT | Mod: 26 | Performed by: SURGERY

## 2025-01-13 NOTE — PROGRESS NOTES
VASCULAR SURGERY PROGRESS NOTE   VASCULAR SURGEON: Tierney Sanches MD, RPVI     LOCATION:  [facility], [place]     Linda Loredo   Medical Record #:  [id number]  YOB: 1945  Age:  79 year old     Date of Service: 1/13/2025    PRIMARY CARE PROVIDER: Louann Peraza Physician      Reason for visit: Follow-up    IMPRESSION: 79-year-old female with recent history of guillotine below-knee amputation due to uncontrolled diabetes and PAD.  Patient would like to discuss the next step.    RECOMMENDATION/RISKS: There is a persistent wound present on the posterior surface of the calf.  It is improving but still present.  I told the patient that we will try to stay below the knee, however, there is a high possibility of above-knee amputation, and it will be an intraoperative decision to choose 1 versus another.  Patient agrees to proceed.    Also, ultrasound was performed today to look for a small lump in the right groin.  The ultrasound is consistent with enlarged lymph node.  I also advised patient if she is really concerned to go forward with a biopsy if necessary, however, I think it would be an overkill given that there is no constitutional or focal symptoms.    HPI:  Linda Loredo is a 79 year old female who was seen today for follow-up  REVIEW OF SYSTEMS:    A 12 point ROS was reviewed and is negative .     PHH:    Past Medical History:   Diagnosis Date    Abscess of left foot 10/31/2022    Acute cystitis without hematuria 07/11/2024    Acute kidney injury 07/11/2024    Adverse effect of anticoagulants, initial encounter 04/16/2024    GUSTAVO (acute kidney injury) 05/26/2023    Allergic rhinitis 04/08/2008    Anemia 07/11/2024    Anticoagulation monitoring, INR range 2-3 07/06/2022    Anxiety 10/20/2011    Cardiac murmur, unspecified 05/29/2019    Cellulitis - bilateral lower extremities 05/27/2023    Cellulitis of buttock 05/26/2023    Chronic atrial fibrillation (H)  2022    New onset during 2022 admission      Chronic kidney disease, stage 3b (H) 2024    Chronic pain 2010    Chronic right-sided heart failure (H) 2023    Constipation 2024    Decubitus ulcers - 5 present on buttocks/perineal region, numerous scabbed over and unstagable on shin and bilateral feet with some bloody blisters noted on feet 2023    Depressive disorder, not elsewhere classified     Diabetic foot ulcer (H) 2023    Diabetic polyneuropathy associated with type 2 diabetes mellitus (H) 2006: on gabapentin.  She has been switched from gabapentin to lyrica.       Elevated liver enzymes 10/20/2011    Essential hypertension with goal blood pressure less than 140/90 2005    Fibromyalgia 10/20/2011    Fracture of fibula, distal, left, closed 10/20/2011    ORIF 10/13/11      Herpes zoster 2005: On gabapentin and lidoderm. She has been switched from gabapentin to lyrica.       Herpes zoster without mention of complication     Hx of osteomyelitis 2024    S.p left BKA       Hypercalcemia 2007    Hypoglycemia 2023    Hypotension 10/27/2011    Insomnia 07/15/2005    Lymphedema, not elsewhere classified 2024    Major depressive disorder, recurrent episode, moderate (H) 2008    Metabolic encephalopathy 2024    Microalbuminuria due to type 2 diabetes mellitus (H) 10/25/2016    Mild intermittent asthma     Mixed hyperlipidemia 2005    Mixed hyperlipidemia due to type 2 diabetes mellitus (H) 2008    Formatting of this note is different from the original.     22 ASCVD 10 year risk: 37.9%      Last Lipids:  Last Lipids:  Chol: 2021 130   63  HDL: 2021 46  Non-HDL: 2021 84  Chol/HDL Ratio: 2021 2.83  LDL DIRECT: . No results found in past 5 years   LDL CHOLESTEROL (mg/dL)   Date Value   2021 71      22   The 10-year ASCVD  risk score ([place] LUIS Jr.    Mixed stress and urge urinary incontinence 05/09/2005 February 26, 2007: On Detrol LA.      Non-healing wound of left heel 09/22/2023    Obesity with BMI >35 and comorbidity 03/13/2006 February 26, 2007: Body mass index is 48.07 kg/(m^2).       Opioid dependence, uncomplicated (H) 04/26/2023    BERLIN (obstructive sleep apnea) 10/23/2007    Sleep study 2004 reportedly showing severe OBSTRUCTIVE SLEEP APNEA and recommend CPAP, Not available for review. Patient is untreated       Osteoarthritis 09/18/2006    Osteomyelitis (H) 10/24/2023    Osteopenia 05/20/2015    Other abnormalities of gait and mobility 11/15/2023    Other urinary incontinence     Pressure injury of deep tissue of sacral region 07/11/2024    Primary hyperparathyroidism 01/23/2013    Work up January 2013 Dr. 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       Supratherapeutic INR 05/27/2023    Type 2 diabetes mellitus with complication, with long-term current use of insulin (H) 04/18/2005    diagnosed in 2001       Type II or unspecified type diabetes mellitus with renal manifestations, uncontrolled(250.42) (H)     Type II or unspecified type diabetes mellitus without mention of complication, not stated as uncontrolled     Umbilical hernia without obstruction and without gangrene 12/13/2018    Unspecified essential hypertension     Unspecified open wound, left foot, subsequent encounter  09/22/2023    UTI (urinary tract infection) - possible 05/26/2023    Venous stasis 04/16/2024    Vitamin D deficiency 09/08/2022    Warfarin-induced coagulopathy 05/27/2023          Past Surgical History:   Procedure Laterality Date    AMPUTATE LEG BELOW KNEE Left 10/7/2023    Procedure: LEFT GUILLOTINE BELOW KNEE AMPUTATION.;  Surgeon: Lan Otto MD;  Location: [facility] OR    AMPUTATE LEG BELOW KNEE Left 10/14/2023    Procedure: debridement of left below the knee amputation;  Surgeon: Lan Otto MD;  Location: [facility] OR    AMPUTATION, LOWER EXTREMITY, USING GUILLOTINE TECHNIQUE Right 11/14/2024    Procedure: AMPUTATION, RIGHT LOWER EXTREMITY, USING GUILLOTINE TECHNIQUE;  Surgeon: Tierney Sanches MD;  Location: [facility] OR    APPLY WOUND VAC Left 1/2/2024    Procedure: WOUND VAC APPLICATION TO LEFT BELOW KNEE STUMP;  Surgeon: Lan Otto MD;  Location:  OR    CHOLECYSTECTOMY      GRAFT SKIN SPLIT THICKNESS FROM TRUNK TO HEAD Left 1/2/2024    Procedure: APPLICATION OF SPLIT-THICKNESS SKIN GRAFT TO LEFT BELOW KNEE STUMP, GRAFT FROM LEFT THIGH;  Surgeon: Lan Otto MD;  Location:  OR    INCISION AND DRAINAGE FOOT, COMBINED Left 9/26/2023    Procedure: Surgical debridement of all nonviable skin and soft tissue and bone left foot;  Surgeon: Nolberto Thorne DPM;  Location: [facility] OR    IR LOWER EXTREMITY ANGIOGRAM LEFT  10/3/2023    IRRIGATION AND DEBRIDEMENT LOWER EXTREMITY, COMBINED Right 7/14/2024    Procedure: IRRIGATION AND DEBRIDEMENT, LOWER EXTREMITY, RIGHT LOWER LEG;  Surgeon: Phuong Gutierrez MD;  Location: [facility] OR    ligation of fallopian tube      OPEN REDUCTION INTERNAL FIXATION ANKLE  10/13/11    left    PICC SINGLE LUMEN PLACEMENT  11/18/2024    pinning of left 2nd toe         ALLERGIES:  Prednisone, Morphine, Morphine [fumaric acid], and Nitrofurantoin    MEDS:    Current Outpatient Medications:     acetaminophen (TYLENOL) 325 MG tablet, Take 3  tablets (975 mg) by mouth 3 times daily., Disp: 180 tablet, Rfl: 0    apixaban ANTICOAGULANT (ELIQUIS) 5 MG tablet, Take 1 tablet (5 mg) by mouth 2 times daily., Disp: 60 tablet, Rfl: 0    atorvastatin (LIPITOR) 40 MG tablet, Take 1 tablet (40 mg) by mouth every evening, Disp: , Rfl:     calcium carbonate (TUMS) 500 MG chewable tablet, Take 1 tablet (500 mg) by mouth 4 times daily as needed for heartburn., Disp: 90 tablet, Rfl: 0    HYDROmorphone (DILAUDID) 2 MG tablet, Take 2 tablets (4 mg) by mouth every 4 hours as needed for breakthrough pain (please give 1 hour before dressing change every 5 days)., Disp: 30 tablet, Rfl: 0    Insulin Aspart FlexPen 100 UNIT/ML SOPN, Inject 5 Units subcutaneously 3 times daily (before meals)., Disp: , Rfl:     insulin degludec (TRESIBA) 200 UNIT/ML pen, Inject 34 Units subcutaneously every morning Hold for BG < 100, Disp: , Rfl:     melatonin 1 MG TABS tablet, Take 1 tablet (1 mg) by mouth nightly as needed for sleep, Disp: , Rfl:     methadone (DOLOPHINE) 5 MG tablet, Take 1 tablet (5 mg) by mouth 4 times daily., Disp: 42 tablet, Rfl: 0    metroNIDAZOLE (FLAGYL) 500 MG tablet, Take 1 tablet (500 mg) by mouth 3 times daily., Disp: 42 tablet, Rfl: 0    miconazole with skin protectant (WOJCIECH ANTIFUNGAL) 2 % CREA cream, Apply topically 2 times daily for 14 days., Disp: 141.7 g, Rfl: 2    mineral oil-hydrophilic petrolatum (AQUAPHOR) external ointment, Apply to left thigh wound BID, Disp: , Rfl:     ondansetron (ZOFRAN ODT) 4 MG ODT tab, Take 1 tablet (4 mg) by mouth every 6 hours as needed for nausea or vomiting., Disp: 30 tablet, Rfl: 0    polyethylene glycol (MIRALAX) 17 g packet, Take 17 g by mouth 2 times daily as needed for constipation., Disp: 30 packet, Rfl: 3    pregabalin (LYRICA) 25 MG capsule, Take 1 capsule (25 mg) by mouth 2 times daily., Disp: 42 capsule, Rfl: 0    senna-docusate (SENOKOT-S/PERICOLACE) 8.6-50 MG tablet, Take 1 tablet by mouth 2 times daily as needed for  constipation., Disp: 60 tablet, Rfl: 0    sennosides (SENOKOT) 8.6 MG tablet, Take 2 tablets by mouth 2 times daily., Disp: 180 tablet, Rfl: 0    torsemide (DEMADEX) 20 MG tablet, Take 60 mg by mouth daily, Disp: , Rfl:     wound support modular (EXPEDITE) LIQD bottle, Take 60 mLs by mouth daily, Disp: , Rfl:     Zinc oxide 10 % CREA, Externally apply topically 3 times daily Apply to coccyx area once per shift after brief changes, Disp: , Rfl:     Current Facility-Administered Medications:     lidocaine (XYLOCAINE) 5 % ointment, , Topical, Daily PRN, Giovanny Dangol, Rosario, APRN CNP, Given at 01/02/25 1240    SOCIAL HABITS:    History   Smoking Status    Never   Smokeless Tobacco    Never     Social History    Substance and Sexual Activity      Alcohol use: No      History   Drug Use No       FAMILY HISTORY:    Family History   Problem Relation Age of Onset    Hypertension Mother     Heart Disease Father         heart attack and cardiomegaly    Diabetes Sister         type 2    Hypertension Sister     Respiratory Sister     Psychotic Disorder Sister         bipolar    Cancer Maternal Grandmother         throat    Cancer Paternal Grandmother         gastric       PE:  /64   Pulse 70   Resp 16   Wt Readings from Last 1 Encounters:   12/04/24 102.4 kg (225 lb 12 oz)     There is no height or weight on file to calculate BMI.    EXAM:  GENERAL: This is a well-developed 79 year old female who appears her stated age  EYES: Grossly normal.  MOUTH: Buccal mucosa normal   CARDIAC: Normal   CHEST/LUNG: Clear to auscultation bilaterally  GASTROINTESINAL soft nontender nondistended  MUSCULOSKELETAL: Grossly normal and both lower extremities are intact.  HEME/LYMPH: No lymphedema  NEUROLOGIC: Focally intact, Alert and oriented x 3.   PSYCH: appropriate affect            DIAGNOSTIC STUDIES:     Images:  No results found.    I personally     LABS:      Sodium   Date Value Ref Range Status   01/02/2025 140 135 - 145 mmol/L  Final   12/23/2024 141 135 - 145 mmol/L Final   12/16/2024 139 135 - 145 mmol/L Final   02/22/2008 140 133 - 144 mmol/L Final   10/15/2007 141 133 - 144 mmol/L Final   09/18/2007 142 133 - 144 mmol/L Final     Urea Nitrogen   Date Value Ref Range Status   01/02/2025 21.5 8.0 - 23.0 mg/dL Final   12/23/2024 31.9 (H) 8.0 - 23.0 mg/dL Final   12/16/2024 36.7 (H) 8.0 - 23.0 mg/dL Final   06/28/2022 42 (H) 8 - 28 mg/dL Final   02/22/2008 15 7 - 30 mg/dL Final   10/15/2007 17 7 - 30 mg/dL Final   09/18/2007 14 7 - 30 mg/dL Final     Hemoglobin   Date Value Ref Range Status   01/02/2025 11.6 (L) 11.7 - 15.7 g/dL Final   12/23/2024 11.8 11.7 - 15.7 g/dL Final   12/16/2024 11.0 (L) 11.7 - 15.7 g/dL Final   02/22/2008 12.6 11.7 - 15.7 g/dL Final   09/18/2007 14.3 11.7 - 15.7 g/dL Final   06/16/2006 13.1 11.7 - 15.7 g/dL Final     Platelet Count   Date Value Ref Range Status   01/02/2025 230 150 - 450 10e3/uL Final   12/23/2024 228 150 - 450 10e3/uL Final   12/16/2024 222 150 - 450 10e3/uL Final   02/22/2008 177 150 - 450 10e9/L Final   09/18/2007 220 150 - 450 10e9/L Final   06/16/2006 207 150 - 450 10e9/L Final     BNP   Date Value Ref Range Status   02/24/2006 21 5 - 100 pg/mL Final   12/07/2005 28 5 - 100 pg/mL Final     INR   Date Value Ref Range Status   11/13/2024 1.73 (H) 0.85 - 1.15 Final   11/12/2024 1.69 (H) 0.85 - 1.15 Final   08/07/2024 2.28 (H) 0.85 - 1.15 Final   06/16/2006 0.98 0.86 - 1.14 Final     INR (External)   Date Value Ref Range Status   07/25/2024 5.0 (A) 0.9 - 1.1 Final   07/22/2024 3.6  Final   07/17/2024 1.6 (A) 0.9 - 1.1 Final       30 minutes spent on the day of encounter doing chart review, history and exam, documentation, and further activities as noted.         Tierney Sanches MD, [facility]  VASCULAR SURGERY

## 2025-01-13 NOTE — PROGRESS NOTES
[facility]        Patient is here for a  follow up  to discuss RLE mass - AKA was postponed     Pt is currently taking Statin and Eliquis.    /64   Pulse 70   Resp 16     The provider has been notified that the patient has no concerns.     Questions patient would like addressed today are: N/A.    Refills are needed: No    Has homecare services and agency name:  at [facility]

## 2025-01-13 NOTE — PATIENT INSTRUCTIONS
Linda    Your visit to [facility] for your surgery is coming soon and we look forward to seeing you! This friendly reminder and pre-procedure checklist will help to ensure your surgery goes smoothly and meets your expectations. At [facility], our goal is to provide you with a great patient experience and to deliver genuine, professional care to every patient.     Please complete all the steps in advance of your visit. If you have any questions about the items listed below, please give our office a call. We can be reached at [phone] or visit our website at [email or web address]  for more information.     Procedure: Right above the knee amputation with possible below the knee amputation    Procedure Date :  2/11/25    Arrival Time:  Tentative time and subject to change. The pre-admission nurse will call you 1-2 days before surgery to tell you time of arrival.     Procedure Location: [facility]:  [address] [place] [address] (Phone: [phone], Fax: [phone])    Please enter through the main entrance. Check in at the Welcome Desk and you will be directed to the surgery unit.     Surgeon: Dr. Tierney Sanches    Admission Type: Inpatient    COVID-19 Test: is no longer required. If you are experiencing COVID symptoms or have tested positive for COVID-19 within 14 days of procedure date, reach out to the care team to reschedule please.     Post Operative Appointment: Amputation: 4 weeks with PA/NP. Staples to stay intact until post-op visit.       If you take blood thinners:   PLEASE DO NOT STOP YOUR ASPIRIN OR PLAVIX UNLESS SPECIFICALLY DIRECTED BY THE VASCULAR SURGEON TO STOP!  In most cases Vascular surgeons want you to continue these. This is different from most NON vascular surgeries and may not be well known by your Primary Care Provider    Eliquis: Hold starting 3 days before surgery    If  you take these diabetic medications, please discuss with your primary doctor and follow the hold instructions:   []  Hold seven (7) days prior for once weekly injectable doses [semaglutide (Ozempic, Wegovy), dulaglutide (Trulicity), exenatide ER (Bydureon), tirzepatide (Mounjaro)]  []  Hold the day before and day of for once daily injectable GLP-1 agonists [exenatide (Byetta), liraglutide (Saxenda, Victoza)]  []  Hold seven (7) days for oral semaglutide (Rybelsus)       Notify our office right away if you have any changes in your health status or if you develop a cold, flu, diarrhea, infection, fever or sore throat before your scheduled surgery date.   We can be reached at [phone] Monday-Friday 8 am-4:30 pm if you have any questions.       Complete the following checklist before your surgery    []  You will need a Pre-op Physical within 30 days before surgery with your primary care provider. This exam is required by the anesthesiologist to ensure a safe surgical experience.    Failure  to obtain your pre-op physical will lead to cancellation of your surgery  Call them right away to schedule this. Please ensure your Preoperative Physical is faxed to the Hospital if done outside of [facility] system.     [] Preoperative Medication Instructions  Contact your prescribing provider and/or vascular surgeon for instructions before discontinuing any medications especially anticoagulants. (Examples: Coumadin, Plavix, Xarelto, Eliquis, Pradaxa, Effient, Brilinta)   Hold Ibuprofen 24 hours prior.   Hold Herbal Supplements and vitamins 14 days prior.   Stop Cialis, Levitra and Viagra 2-3 days before surgery.   Please discuss with your primary care provider if you need to hold any blood pressure medications or others.     [] Fasting Requirements  Nothing to eat for 8 hours before surgery unless instructed differently by the surgery nurse.   You may have clear liquids up to two hours before your arrival time (coffee,  tea, water, or Gatorade. No cream or milk)  If your primary care provider has instructed you to continue oral medications, you may take them on the morning of surgery with a small sip of water.    No alcohol or smoking 24 hours before surgery.     [] Contact your insurance regarding coverage  If you would like a [place] Estimate for your upcoming procedure at [facility] Location, contact Cost of Care Estimates   Advocates are available Monday through Friday 8am - 5pm   [phone]  You may also submit a request online through your [facility] [employer] account.   If your procedure is at [facility] please contact the numbers below for Cost of Care Estimates.   - Facility Charge: [phone]    Anesthesiology charge:  [phone]      [] DO NOT BRING FMLA WITH TO SURGERY.  These should be sent to the provider's office by fax to [phone].     [] Day of Surgery  Medications - Take as indicated with sips of water.   Wear comfortable loose fitting clothes. Wear your glasses-Not contacts. Do not wear jewelry including rings and body piercings.   You may have 1 family member wait in the lobby during your surgery. Visitor restrictions are subject to change. Please verify with the surgery nurse when they call.   If same day surgery-Have an adult  come with you to surgery that can help you understand the surgeon's instructions, drive you home and stay with you overnight the first night.    [] You will receive a call from a surgery nurse 1-3 days prior to surgery. They will go over more details with you.     [] If the hospital is at max capacity and does not have available inpatient beds, your procedure may need to be postponed. We will contact you if this happens.      Use Chlorhexidine Gluconate 4% soap to help prevent surgical site infections    You play an important part in helping to prevent a surgical site infection. Let s review what you will need to do.    Chlorhexidine Gluconate 4% is a  powerful antiseptic that will help make sure your skin is free of germs. It looks and feels just like liquid soap and should be used liked a shower gel.    **[facility] patients can receive free Chlorhexidine Gluconate 4% soap for surgery at any outpatient [facility] pharmacy. You can also buy this over the counter at any pharmacy.**    Do not shave within 12 inches of your incision (surgical cut) area for at least 3 days before surgery. Shaving can make small cuts in the skin. This puts you at a higher risk of infection.    Items you will need for each shower:   1 newly washed towel   4 ounces of one of the above soaps   Clean pajamas or clothes to change into    Follow these steps the evening before surgery and the morning of surgery.  Remove all body piercings and jewelry, and leave them off until after your surgery.  Wash your hair and body with your regular shampoo and soap. Make sure you rinse the shampoo and soap from your hair and body.  Using clean hands, apply about 2 ounces of soap gently on your skin from your ear lobes to your toes. You don t need to scrub your skin, but make sure you liberally wash the area where your surgery will be done. Use on your groin area last. Do not use this soap on your face or head. If you get any soap in your eyes, ears or mouth, rinse right away. It is very important to let the soap stay on your skin for at least 1 minute.  Repeat step 3.   Rinse well and dry off using a clean towel. If you feel any tingling, itching or other irritation, rinse right away. It is normal to feel some coolness on the skin after using the antiseptic soap. Your skin may feel a bit dry after the shower, but do not use any lotions, creams or moisturizers. Do not use hair spray or other products in your hair. Also, do not wash with your regular soap after using this.  Dress in freshly washed clothes or pajamas. Use fresh pillowcases and sheets on your bed.  Repeat these steps the morning of  surgery.    If you are allergic or sensitive to Chlorhexidine Gluconate, use an antibacterial soap instead, following the same steps.    If you cannot use the shower, wash yourself with this soap at the sink. Make sure the sink is clean before you begin, and do the best you can to clean your entire body.

## 2025-01-13 NOTE — TELEPHONE ENCOUNTER
Surgery Scheduled    Pt sent with surgery instructions/orders to be given to TCU.  TCU will need to complete preop exam and address Eliquis med hold.    Surgery/Procedure: Right, Above knee amputation, possible below knee amputation    Location: [facility]:  [address] [facility] [address] [address] (Phone: [phone], Fax: [phone])    Please enter through the main entrance. Check in at the Welcome Desk and you will be directed to the surgery unit.     Date: 2/11/2025    Time: 11:05 AM    Admission Type: Inpatient    Surgeon: Dr. Tierney Sanches    OR Confirmed & :  Yes with Lizbet on 1/13/2025    Entered on provider calendar:  Yes    Post Op: See appt desk    Wound Vac Needed:  TBD    Home Care Needed:  TBD    Blood Thinners Addressed:  Pt on eliquis  - message sent to TCU regarding Eliquis hold.    Stress Test Clearance: NO

## 2025-01-14 NOTE — PROGRESS NOTES
We had a phone call scheduled with Margaret today, but after reviewing her chart, there have been some changes to her plan of care. She is scheduled for a BKA revision/AKA surgery with Dr. Sanches on 2/11/25. There have been some complications and delays with her BKA healing. She is currently at [facility].    Margaret had previously told me she would like to discuss prosthetic care further after she has had her final surgery and is a little closer to being healed. I will plan to reach out to her after this planned surgery in February to see if she still plans to pursue prosthetic care.    Electronically signed by Yael Orlando CPO, NAYANO

## 2025-01-24 ENCOUNTER — LAB REQUISITION (OUTPATIENT)
Dept: LAB | Facility: CLINIC | Age: 80
End: 2025-01-24
Payer: COMMERCIAL

## 2025-01-24 DIAGNOSIS — D64.9 ANEMIA, UNSPECIFIED: ICD-10-CM

## 2025-01-24 DIAGNOSIS — I10 ESSENTIAL (PRIMARY) HYPERTENSION: ICD-10-CM

## 2025-01-27 LAB
ANION GAP SERPL CALCULATED.3IONS-SCNC: 7 MMOL/L (ref 7–15)
BUN SERPL-MCNC: 18.1 MG/DL (ref 8–23)
CALCIUM SERPL-MCNC: 10.3 MG/DL (ref 8.8–10.4)
CHLORIDE SERPL-SCNC: 99 MMOL/L (ref 98–107)
CREAT SERPL-MCNC: 0.68 MG/DL (ref 0.51–0.95)
EGFRCR SERPLBLD CKD-EPI 2021: 88 ML/MIN/1.73M2
GLUCOSE SERPL-MCNC: 102 MG/DL (ref 70–99)
HCO3 SERPL-SCNC: 34 MMOL/L (ref 22–29)
HGB BLD-MCNC: 10.6 G/DL (ref 11.7–15.7)
POTASSIUM SERPL-SCNC: 3.8 MMOL/L (ref 3.4–5.3)
SODIUM SERPL-SCNC: 140 MMOL/L (ref 135–145)

## 2025-01-27 PROCEDURE — 85018 HEMOGLOBIN: CPT | Mod: ORL | Performed by: FAMILY MEDICINE

## 2025-01-27 PROCEDURE — 80048 BASIC METABOLIC PNL TOTAL CA: CPT | Mod: ORL | Performed by: FAMILY MEDICINE

## 2025-01-27 PROCEDURE — P9603 ONE-WAY ALLOW PRORATED MILES: HCPCS | Mod: ORL | Performed by: FAMILY MEDICINE

## 2025-01-27 PROCEDURE — 36415 COLL VENOUS BLD VENIPUNCTURE: CPT | Mod: ORL | Performed by: FAMILY MEDICINE

## 2025-02-07 ENCOUNTER — ANESTHESIA EVENT (OUTPATIENT)
Dept: SURGERY | Facility: CLINIC | Age: 80
End: 2025-02-07
Payer: COMMERCIAL

## 2025-02-10 ENCOUNTER — MEDICAL CORRESPONDENCE (OUTPATIENT)
Dept: HEALTH INFORMATION MANAGEMENT | Facility: CLINIC | Age: 80
End: 2025-02-10
Payer: COMMERCIAL

## 2025-02-11 ENCOUNTER — ANESTHESIA (OUTPATIENT)
Dept: SURGERY | Facility: CLINIC | Age: 80
End: 2025-02-11
Payer: COMMERCIAL

## 2025-02-11 ENCOUNTER — HOSPITAL ENCOUNTER (INPATIENT)
Facility: CLINIC | Age: 80
DRG: 240 | End: 2025-02-11
Attending: STUDENT IN AN ORGANIZED HEALTH CARE EDUCATION/TRAINING PROGRAM | Admitting: STUDENT IN AN ORGANIZED HEALTH CARE EDUCATION/TRAINING PROGRAM
Payer: COMMERCIAL

## 2025-02-11 ENCOUNTER — ANCILLARY PROCEDURE (OUTPATIENT)
Dept: ULTRASOUND IMAGING | Facility: CLINIC | Age: 80
End: 2025-02-11
Attending: STUDENT IN AN ORGANIZED HEALTH CARE EDUCATION/TRAINING PROGRAM
Payer: COMMERCIAL

## 2025-02-11 DIAGNOSIS — I48.20 CHRONIC ATRIAL FIBRILLATION (H): Chronic | ICD-10-CM

## 2025-02-11 DIAGNOSIS — L24.A2 IRRITANT CONTACT DERMATITIS DUE TO INCONTINENCE OF BOTH FECES AND URINE: Primary | ICD-10-CM

## 2025-02-11 DIAGNOSIS — S78.111A ABOVE KNEE AMPUTATION OF RIGHT LOWER EXTREMITY (H): ICD-10-CM

## 2025-02-11 LAB
ANION GAP SERPL CALCULATED.3IONS-SCNC: 8 MMOL/L (ref 7–15)
BACTERIA SPEC CULT: NORMAL
BACTERIA SPEC CULT: NORMAL
BASOPHILS # BLD AUTO: 0 10E3/UL (ref 0–0.2)
BASOPHILS NFR BLD AUTO: 0 %
BUN SERPL-MCNC: 21.9 MG/DL (ref 8–23)
CALCIUM SERPL-MCNC: 9.9 MG/DL (ref 8.8–10.4)
CHLORIDE SERPL-SCNC: 95 MMOL/L (ref 98–107)
CHOLEST SERPL-MCNC: 75 MG/DL
CREAT SERPL-MCNC: 0.74 MG/DL (ref 0.51–0.95)
EGFRCR SERPLBLD CKD-EPI 2021: 82 ML/MIN/1.73M2
EOSINOPHIL # BLD AUTO: 0.5 10E3/UL (ref 0–0.7)
EOSINOPHIL NFR BLD AUTO: 6 %
ERYTHROCYTE [DISTWIDTH] IN BLOOD BY AUTOMATED COUNT: 17.3 % (ref 10–15)
EST. AVERAGE GLUCOSE BLD GHB EST-MCNC: 160 MG/DL
GLUCOSE BLDC GLUCOMTR-MCNC: 152 MG/DL (ref 70–99)
GLUCOSE BLDC GLUCOMTR-MCNC: 166 MG/DL (ref 70–99)
GLUCOSE BLDC GLUCOMTR-MCNC: 287 MG/DL (ref 70–99)
GLUCOSE BLDC GLUCOMTR-MCNC: 345 MG/DL (ref 70–99)
GLUCOSE SERPL-MCNC: 159 MG/DL (ref 70–99)
GRAM STAIN RESULT: NORMAL
HBA1C MFR BLD: 7.2 %
HCO3 SERPL-SCNC: 34 MMOL/L (ref 22–29)
HCT VFR BLD AUTO: 33.4 % (ref 35–47)
HDLC SERPL-MCNC: 37 MG/DL
HGB BLD-MCNC: 10.6 G/DL (ref 11.7–15.7)
HOLD SPECIMEN: NORMAL
IMM GRANULOCYTES # BLD: 0 10E3/UL
IMM GRANULOCYTES NFR BLD: 1 %
LDLC SERPL CALC-MCNC: 29 MG/DL
LYMPHOCYTES # BLD AUTO: 1.3 10E3/UL (ref 0.8–5.3)
LYMPHOCYTES NFR BLD AUTO: 16 %
MCH RBC QN AUTO: 29 PG (ref 26.5–33)
MCHC RBC AUTO-ENTMCNC: 31.7 G/DL (ref 31.5–36.5)
MCV RBC AUTO: 91 FL (ref 78–100)
MONOCYTES # BLD AUTO: 0.6 10E3/UL (ref 0–1.3)
MONOCYTES NFR BLD AUTO: 8 %
NEUTROPHILS # BLD AUTO: 5.6 10E3/UL (ref 1.6–8.3)
NEUTROPHILS NFR BLD AUTO: 69 %
NONHDLC SERPL-MCNC: 38 MG/DL
NRBC # BLD AUTO: 0 10E3/UL
NRBC BLD AUTO-RTO: 0 /100
PLATELET # BLD AUTO: 171 10E3/UL (ref 150–450)
POTASSIUM SERPL-SCNC: 3.8 MMOL/L (ref 3.4–5.3)
POTASSIUM SERPL-SCNC: 5.9 MMOL/L (ref 3.4–5.3)
RBC # BLD AUTO: 3.66 10E6/UL (ref 3.8–5.2)
SODIUM SERPL-SCNC: 137 MMOL/L (ref 135–145)
TRIGL SERPL-MCNC: 47 MG/DL
WBC # BLD AUTO: 8.2 10E3/UL (ref 4–11)

## 2025-02-11 PROCEDURE — 88307 TISSUE EXAM BY PATHOLOGIST: CPT | Mod: TC | Performed by: STUDENT IN AN ORGANIZED HEALTH CARE EDUCATION/TRAINING PROGRAM

## 2025-02-11 PROCEDURE — 85041 AUTOMATED RBC COUNT: CPT | Performed by: SURGERY

## 2025-02-11 PROCEDURE — 258N000003 HC RX IP 258 OP 636: Performed by: STUDENT IN AN ORGANIZED HEALTH CARE EDUCATION/TRAINING PROGRAM

## 2025-02-11 PROCEDURE — 85004 AUTOMATED DIFF WBC COUNT: CPT | Performed by: SURGERY

## 2025-02-11 PROCEDURE — 250N000011 HC RX IP 250 OP 636: Performed by: NURSE ANESTHETIST, CERTIFIED REGISTERED

## 2025-02-11 PROCEDURE — 250N000011 HC RX IP 250 OP 636: Performed by: STUDENT IN AN ORGANIZED HEALTH CARE EDUCATION/TRAINING PROGRAM

## 2025-02-11 PROCEDURE — 0Y6C0Z3 DETACHMENT AT RIGHT UPPER LEG, LOW, OPEN APPROACH: ICD-10-PCS | Performed by: STUDENT IN AN ORGANIZED HEALTH CARE EDUCATION/TRAINING PROGRAM

## 2025-02-11 PROCEDURE — 120N000001 HC R&B MED SURG/OB

## 2025-02-11 PROCEDURE — 250N000009 HC RX 250: Performed by: STUDENT IN AN ORGANIZED HEALTH CARE EDUCATION/TRAINING PROGRAM

## 2025-02-11 PROCEDURE — 84132 ASSAY OF SERUM POTASSIUM: CPT | Performed by: STUDENT IN AN ORGANIZED HEALTH CARE EDUCATION/TRAINING PROGRAM

## 2025-02-11 PROCEDURE — 87103 BLOOD FUNGUS CULTURE: CPT | Performed by: STUDENT IN AN ORGANIZED HEALTH CARE EDUCATION/TRAINING PROGRAM

## 2025-02-11 PROCEDURE — 370N000017 HC ANESTHESIA TECHNICAL FEE, PER MIN: Performed by: STUDENT IN AN ORGANIZED HEALTH CARE EDUCATION/TRAINING PROGRAM

## 2025-02-11 PROCEDURE — 87101 SKIN FUNGI CULTURE: CPT | Performed by: STUDENT IN AN ORGANIZED HEALTH CARE EDUCATION/TRAINING PROGRAM

## 2025-02-11 PROCEDURE — 87205 SMEAR GRAM STAIN: CPT | Performed by: STUDENT IN AN ORGANIZED HEALTH CARE EDUCATION/TRAINING PROGRAM

## 2025-02-11 PROCEDURE — 360N000076 HC SURGERY LEVEL 3, PER MIN: Performed by: STUDENT IN AN ORGANIZED HEALTH CARE EDUCATION/TRAINING PROGRAM

## 2025-02-11 PROCEDURE — 87070 CULTURE OTHR SPECIMN AEROBIC: CPT | Performed by: STUDENT IN AN ORGANIZED HEALTH CARE EDUCATION/TRAINING PROGRAM

## 2025-02-11 PROCEDURE — 999N000141 HC STATISTIC PRE-PROCEDURE NURSING ASSESSMENT: Performed by: STUDENT IN AN ORGANIZED HEALTH CARE EDUCATION/TRAINING PROGRAM

## 2025-02-11 PROCEDURE — 36415 COLL VENOUS BLD VENIPUNCTURE: CPT | Performed by: STUDENT IN AN ORGANIZED HEALTH CARE EDUCATION/TRAINING PROGRAM

## 2025-02-11 PROCEDURE — 87075 CULTR BACTERIA EXCEPT BLOOD: CPT | Performed by: STUDENT IN AN ORGANIZED HEALTH CARE EDUCATION/TRAINING PROGRAM

## 2025-02-11 PROCEDURE — 250N000009 HC RX 250: Performed by: NURSE ANESTHETIST, CERTIFIED REGISTERED

## 2025-02-11 PROCEDURE — 80048 BASIC METABOLIC PNL TOTAL CA: CPT | Performed by: SURGERY

## 2025-02-11 PROCEDURE — 85018 HEMOGLOBIN: CPT | Performed by: SURGERY

## 2025-02-11 PROCEDURE — 36415 COLL VENOUS BLD VENIPUNCTURE: CPT | Performed by: SURGERY

## 2025-02-11 PROCEDURE — 250N000012 HC RX MED GY IP 250 OP 636 PS 637: Performed by: STUDENT IN AN ORGANIZED HEALTH CARE EDUCATION/TRAINING PROGRAM

## 2025-02-11 PROCEDURE — 27590 AMPUTATE LEG AT THIGH: CPT | Mod: 58 | Performed by: STUDENT IN AN ORGANIZED HEALTH CARE EDUCATION/TRAINING PROGRAM

## 2025-02-11 PROCEDURE — 258N000003 HC RX IP 258 OP 636: Performed by: NURSE ANESTHETIST, CERTIFIED REGISTERED

## 2025-02-11 PROCEDURE — 250N000025 HC SEVOFLURANE, PER MIN: Performed by: STUDENT IN AN ORGANIZED HEALTH CARE EDUCATION/TRAINING PROGRAM

## 2025-02-11 PROCEDURE — 82465 ASSAY BLD/SERUM CHOLESTEROL: CPT | Performed by: STUDENT IN AN ORGANIZED HEALTH CARE EDUCATION/TRAINING PROGRAM

## 2025-02-11 PROCEDURE — 99222 1ST HOSP IP/OBS MODERATE 55: CPT | Performed by: STUDENT IN AN ORGANIZED HEALTH CARE EDUCATION/TRAINING PROGRAM

## 2025-02-11 PROCEDURE — 250N000011 HC RX IP 250 OP 636: Mod: JZ | Performed by: ANESTHESIOLOGY

## 2025-02-11 PROCEDURE — 710N000010 HC RECOVERY PHASE 1, LEVEL 2, PER MIN: Performed by: STUDENT IN AN ORGANIZED HEALTH CARE EDUCATION/TRAINING PROGRAM

## 2025-02-11 PROCEDURE — 250N000013 HC RX MED GY IP 250 OP 250 PS 637: Performed by: STUDENT IN AN ORGANIZED HEALTH CARE EDUCATION/TRAINING PROGRAM

## 2025-02-11 PROCEDURE — P9045 ALBUMIN (HUMAN), 5%, 250 ML: HCPCS | Performed by: STUDENT IN AN ORGANIZED HEALTH CARE EDUCATION/TRAINING PROGRAM

## 2025-02-11 PROCEDURE — 272N000001 HC OR GENERAL SUPPLY STERILE: Performed by: STUDENT IN AN ORGANIZED HEALTH CARE EDUCATION/TRAINING PROGRAM

## 2025-02-11 PROCEDURE — 258N000001 HC RX 258: Performed by: STUDENT IN AN ORGANIZED HEALTH CARE EDUCATION/TRAINING PROGRAM

## 2025-02-11 PROCEDURE — 250N000011 HC RX IP 250 OP 636: Performed by: SURGERY

## 2025-02-11 PROCEDURE — 250N000011 HC RX IP 250 OP 636: Mod: JZ | Performed by: STUDENT IN AN ORGANIZED HEALTH CARE EDUCATION/TRAINING PROGRAM

## 2025-02-11 PROCEDURE — 82565 ASSAY OF CREATININE: CPT | Performed by: SURGERY

## 2025-02-11 PROCEDURE — 83036 HEMOGLOBIN GLYCOSYLATED A1C: CPT | Performed by: STUDENT IN AN ORGANIZED HEALTH CARE EDUCATION/TRAINING PROGRAM

## 2025-02-11 RX ORDER — SODIUM CHLORIDE, SODIUM LACTATE, POTASSIUM CHLORIDE, CALCIUM CHLORIDE 600; 310; 30; 20 MG/100ML; MG/100ML; MG/100ML; MG/100ML
INJECTION, SOLUTION INTRAVENOUS CONTINUOUS
Status: DISCONTINUED | OUTPATIENT
Start: 2025-02-11 | End: 2025-02-11 | Stop reason: HOSPADM

## 2025-02-11 RX ORDER — PROPOFOL 10 MG/ML
INJECTION, EMULSION INTRAVENOUS PRN
Status: DISCONTINUED | OUTPATIENT
Start: 2025-02-11 | End: 2025-02-11

## 2025-02-11 RX ORDER — BISACODYL 10 MG
10 SUPPOSITORY, RECTAL RECTAL DAILY PRN
Status: DISCONTINUED | OUTPATIENT
Start: 2025-02-14 | End: 2025-02-17 | Stop reason: HOSPADM

## 2025-02-11 RX ORDER — MAGNESIUM HYDROXIDE 1200 MG/15ML
LIQUID ORAL PRN
Status: DISCONTINUED | OUTPATIENT
Start: 2025-02-11 | End: 2025-02-11 | Stop reason: HOSPADM

## 2025-02-11 RX ORDER — ONDANSETRON 2 MG/ML
4 INJECTION INTRAMUSCULAR; INTRAVENOUS EVERY 6 HOURS PRN
Status: DISCONTINUED | OUTPATIENT
Start: 2025-02-11 | End: 2025-02-17 | Stop reason: HOSPADM

## 2025-02-11 RX ORDER — CALCIUM CARBONATE 500 MG/1
500 TABLET, CHEWABLE ORAL 4 TIMES DAILY PRN
Status: DISCONTINUED | OUTPATIENT
Start: 2025-02-11 | End: 2025-02-17 | Stop reason: HOSPADM

## 2025-02-11 RX ORDER — ONDANSETRON 2 MG/ML
4 INJECTION INTRAMUSCULAR; INTRAVENOUS EVERY 30 MIN PRN
Status: DISCONTINUED | OUTPATIENT
Start: 2025-02-11 | End: 2025-02-11 | Stop reason: HOSPADM

## 2025-02-11 RX ORDER — ONDANSETRON 2 MG/ML
INJECTION INTRAMUSCULAR; INTRAVENOUS PRN
Status: DISCONTINUED | OUTPATIENT
Start: 2025-02-11 | End: 2025-02-11

## 2025-02-11 RX ORDER — DEXTROSE MONOHYDRATE 25 G/50ML
25-50 INJECTION, SOLUTION INTRAVENOUS
Status: DISCONTINUED | OUTPATIENT
Start: 2025-02-11 | End: 2025-02-12

## 2025-02-11 RX ORDER — OXYCODONE HYDROCHLORIDE 5 MG/1
5 TABLET ORAL EVERY 4 HOURS PRN
Status: DISCONTINUED | OUTPATIENT
Start: 2025-02-11 | End: 2025-02-11

## 2025-02-11 RX ORDER — BUPIVACAINE HYDROCHLORIDE 5 MG/ML
INJECTION, SOLUTION EPIDURAL; INTRACAUDAL
Status: COMPLETED | OUTPATIENT
Start: 2025-02-11 | End: 2025-02-11

## 2025-02-11 RX ORDER — HYDROMORPHONE HYDROCHLORIDE 4 MG/1
4 TABLET ORAL
Status: DISCONTINUED | OUTPATIENT
Start: 2025-02-11 | End: 2025-02-12

## 2025-02-11 RX ORDER — ACETAMINOPHEN 325 MG/1
975 TABLET ORAL EVERY 8 HOURS
Status: DISCONTINUED | OUTPATIENT
Start: 2025-02-11 | End: 2025-02-17 | Stop reason: HOSPADM

## 2025-02-11 RX ORDER — OXYCODONE HYDROCHLORIDE 5 MG/1
10 TABLET ORAL EVERY 4 HOURS PRN
Status: DISCONTINUED | OUTPATIENT
Start: 2025-02-11 | End: 2025-02-11

## 2025-02-11 RX ORDER — HYDROMORPHONE HCL IN WATER/PF 6 MG/30 ML
0.2 PATIENT CONTROLLED ANALGESIA SYRINGE INTRAVENOUS
Status: DISCONTINUED | OUTPATIENT
Start: 2025-02-11 | End: 2025-02-11

## 2025-02-11 RX ORDER — ONDANSETRON 4 MG/1
4 TABLET, ORALLY DISINTEGRATING ORAL EVERY 6 HOURS PRN
Status: DISCONTINUED | OUTPATIENT
Start: 2025-02-11 | End: 2025-02-11

## 2025-02-11 RX ORDER — SODIUM CHLORIDE 9 MG/ML
INJECTION, SOLUTION INTRAVENOUS CONTINUOUS
Status: DISCONTINUED | OUTPATIENT
Start: 2025-02-11 | End: 2025-02-11

## 2025-02-11 RX ORDER — HYDROMORPHONE HCL IN WATER/PF 6 MG/30 ML
0.4 PATIENT CONTROLLED ANALGESIA SYRINGE INTRAVENOUS
Status: DISCONTINUED | OUTPATIENT
Start: 2025-02-11 | End: 2025-02-11

## 2025-02-11 RX ORDER — LIDOCAINE 40 MG/G
CREAM TOPICAL
Status: DISCONTINUED | OUTPATIENT
Start: 2025-02-11 | End: 2025-02-11 | Stop reason: HOSPADM

## 2025-02-11 RX ORDER — POLYETHYLENE GLYCOL 3350 17 G/17G
17 POWDER, FOR SOLUTION ORAL 2 TIMES DAILY PRN
Status: DISCONTINUED | OUTPATIENT
Start: 2025-02-11 | End: 2025-02-17 | Stop reason: HOSPADM

## 2025-02-11 RX ORDER — ATORVASTATIN CALCIUM 40 MG/1
40 TABLET, FILM COATED ORAL EVERY EVENING
Status: DISCONTINUED | OUTPATIENT
Start: 2025-02-11 | End: 2025-02-17 | Stop reason: HOSPADM

## 2025-02-11 RX ORDER — PROCHLORPERAZINE MALEATE 5 MG/1
5 TABLET ORAL EVERY 6 HOURS PRN
Status: DISCONTINUED | OUTPATIENT
Start: 2025-02-11 | End: 2025-02-17 | Stop reason: HOSPADM

## 2025-02-11 RX ORDER — FENTANYL CITRATE 50 UG/ML
25-100 INJECTION, SOLUTION INTRAMUSCULAR; INTRAVENOUS
Status: DISCONTINUED | OUTPATIENT
Start: 2025-02-11 | End: 2025-02-11 | Stop reason: HOSPADM

## 2025-02-11 RX ORDER — TORSEMIDE 20 MG/1
60 TABLET ORAL DAILY
Status: DISCONTINUED | OUTPATIENT
Start: 2025-02-12 | End: 2025-02-17 | Stop reason: HOSPADM

## 2025-02-11 RX ORDER — CEFAZOLIN SODIUM/WATER 2 G/20 ML
2 SYRINGE (ML) INTRAVENOUS SEE ADMIN INSTRUCTIONS
Status: DISCONTINUED | OUTPATIENT
Start: 2025-02-11 | End: 2025-02-11 | Stop reason: HOSPADM

## 2025-02-11 RX ORDER — FLUMAZENIL 0.1 MG/ML
0.2 INJECTION, SOLUTION INTRAVENOUS
Status: DISCONTINUED | OUTPATIENT
Start: 2025-02-11 | End: 2025-02-11 | Stop reason: HOSPADM

## 2025-02-11 RX ORDER — ONDANSETRON 4 MG/1
4 TABLET, ORALLY DISINTEGRATING ORAL EVERY 6 HOURS PRN
Status: DISCONTINUED | OUTPATIENT
Start: 2025-02-11 | End: 2025-02-17 | Stop reason: HOSPADM

## 2025-02-11 RX ORDER — OXYCODONE HYDROCHLORIDE 5 MG/1
5 TABLET ORAL
Status: DISCONTINUED | OUTPATIENT
Start: 2025-02-11 | End: 2025-02-11

## 2025-02-11 RX ORDER — EPHEDRINE SULFATE 50 MG/ML
INJECTION, SOLUTION INTRAMUSCULAR; INTRAVENOUS; SUBCUTANEOUS PRN
Status: DISCONTINUED | OUTPATIENT
Start: 2025-02-11 | End: 2025-02-11

## 2025-02-11 RX ORDER — HYDROMORPHONE HCL IN WATER/PF 6 MG/30 ML
0.4 PATIENT CONTROLLED ANALGESIA SYRINGE INTRAVENOUS EVERY 5 MIN PRN
Status: DISCONTINUED | OUTPATIENT
Start: 2025-02-11 | End: 2025-02-11 | Stop reason: HOSPADM

## 2025-02-11 RX ORDER — NICOTINE POLACRILEX 4 MG
15-30 LOZENGE BUCCAL
Status: DISCONTINUED | OUTPATIENT
Start: 2025-02-11 | End: 2025-02-12

## 2025-02-11 RX ORDER — MICONAZOLE NITRATE 20 MG/G
CREAM TOPICAL EVERY 12 HOURS PRN
COMMUNITY

## 2025-02-11 RX ORDER — NALOXONE HYDROCHLORIDE 0.4 MG/ML
0.1 INJECTION, SOLUTION INTRAMUSCULAR; INTRAVENOUS; SUBCUTANEOUS
Status: DISCONTINUED | OUTPATIENT
Start: 2025-02-11 | End: 2025-02-11 | Stop reason: HOSPADM

## 2025-02-11 RX ORDER — METHADONE HYDROCHLORIDE 5 MG/1
5 TABLET ORAL 4 TIMES DAILY
Status: DISCONTINUED | OUTPATIENT
Start: 2025-02-11 | End: 2025-02-13

## 2025-02-11 RX ORDER — CEFAZOLIN SODIUM 2 G/100ML
2 INJECTION, SOLUTION INTRAVENOUS EVERY 8 HOURS
Status: COMPLETED | OUTPATIENT
Start: 2025-02-11 | End: 2025-02-12

## 2025-02-11 RX ORDER — NYSTATIN 100000 [USP'U]/G
POWDER TOPICAL 2 TIMES DAILY
COMMUNITY

## 2025-02-11 RX ORDER — POLYETHYLENE GLYCOL 3350 17 G/17G
17 POWDER, FOR SOLUTION ORAL DAILY
Status: DISCONTINUED | OUTPATIENT
Start: 2025-02-12 | End: 2025-02-11

## 2025-02-11 RX ORDER — FENTANYL CITRATE 50 UG/ML
50 INJECTION, SOLUTION INTRAMUSCULAR; INTRAVENOUS EVERY 5 MIN PRN
Status: DISCONTINUED | OUTPATIENT
Start: 2025-02-11 | End: 2025-02-11 | Stop reason: HOSPADM

## 2025-02-11 RX ORDER — PROPOFOL 10 MG/ML
INJECTION, EMULSION INTRAVENOUS CONTINUOUS PRN
Status: DISCONTINUED | OUTPATIENT
Start: 2025-02-11 | End: 2025-02-11

## 2025-02-11 RX ORDER — ZINC SULFATE 50(220)MG
220 CAPSULE ORAL DAILY
Status: DISCONTINUED | OUTPATIENT
Start: 2025-02-12 | End: 2025-02-17 | Stop reason: HOSPADM

## 2025-02-11 RX ORDER — LIDOCAINE 40 MG/G
CREAM TOPICAL
Status: DISCONTINUED | OUTPATIENT
Start: 2025-02-11 | End: 2025-02-17 | Stop reason: HOSPADM

## 2025-02-11 RX ORDER — KETAMINE HYDROCHLORIDE 10 MG/ML
INJECTION INTRAMUSCULAR; INTRAVENOUS PRN
Status: DISCONTINUED | OUTPATIENT
Start: 2025-02-11 | End: 2025-02-11

## 2025-02-11 RX ORDER — FENTANYL CITRATE 50 UG/ML
25 INJECTION, SOLUTION INTRAMUSCULAR; INTRAVENOUS EVERY 5 MIN PRN
Status: DISCONTINUED | OUTPATIENT
Start: 2025-02-11 | End: 2025-02-11 | Stop reason: HOSPADM

## 2025-02-11 RX ORDER — DEXAMETHASONE SODIUM PHOSPHATE 4 MG/ML
4 INJECTION, SOLUTION INTRA-ARTICULAR; INTRALESIONAL; INTRAMUSCULAR; INTRAVENOUS; SOFT TISSUE
Status: DISCONTINUED | OUTPATIENT
Start: 2025-02-11 | End: 2025-02-11 | Stop reason: HOSPADM

## 2025-02-11 RX ORDER — LIDOCAINE 40 MG/G
CREAM TOPICAL 3 TIMES DAILY
COMMUNITY

## 2025-02-11 RX ORDER — ONDANSETRON 4 MG/1
4 TABLET, ORALLY DISINTEGRATING ORAL EVERY 30 MIN PRN
Status: DISCONTINUED | OUTPATIENT
Start: 2025-02-11 | End: 2025-02-11 | Stop reason: HOSPADM

## 2025-02-11 RX ORDER — DEXAMETHASONE SODIUM PHOSPHATE 4 MG/ML
INJECTION, SOLUTION INTRA-ARTICULAR; INTRALESIONAL; INTRAMUSCULAR; INTRAVENOUS; SOFT TISSUE PRN
Status: DISCONTINUED | OUTPATIENT
Start: 2025-02-11 | End: 2025-02-11

## 2025-02-11 RX ORDER — GABAPENTIN 300 MG/1
300 CAPSULE ORAL 3 TIMES DAILY
Status: DISCONTINUED | OUTPATIENT
Start: 2025-02-11 | End: 2025-02-17

## 2025-02-11 RX ORDER — HYDROMORPHONE HYDROCHLORIDE 2 MG/1
2 TABLET ORAL
Status: DISCONTINUED | OUTPATIENT
Start: 2025-02-11 | End: 2025-02-12

## 2025-02-11 RX ORDER — CEFAZOLIN SODIUM/WATER 2 G/20 ML
2 SYRINGE (ML) INTRAVENOUS
Status: COMPLETED | OUTPATIENT
Start: 2025-02-11 | End: 2025-02-11

## 2025-02-11 RX ORDER — HYDROMORPHONE HYDROCHLORIDE 2 MG/1
4 TABLET ORAL EVERY 4 HOURS PRN
Status: DISCONTINUED | OUTPATIENT
Start: 2025-02-11 | End: 2025-02-11

## 2025-02-11 RX ORDER — MICONAZOLE NITRATE 20 MG/G
CREAM TOPICAL 2 TIMES DAILY
COMMUNITY

## 2025-02-11 RX ORDER — ENOXAPARIN SODIUM 100 MG/ML
40 INJECTION SUBCUTANEOUS EVERY 24 HOURS
Status: DISCONTINUED | OUTPATIENT
Start: 2025-02-12 | End: 2025-02-13

## 2025-02-11 RX ORDER — ZINC SULFATE 50(220)MG
220 CAPSULE ORAL DAILY
COMMUNITY

## 2025-02-11 RX ORDER — HYDROMORPHONE HCL IN WATER/PF 6 MG/30 ML
0.2 PATIENT CONTROLLED ANALGESIA SYRINGE INTRAVENOUS EVERY 5 MIN PRN
Status: DISCONTINUED | OUTPATIENT
Start: 2025-02-11 | End: 2025-02-11 | Stop reason: HOSPADM

## 2025-02-11 RX ORDER — MICONAZOLE NITRATE 20 MG/G
CREAM TOPICAL 2 TIMES DAILY
Status: DISCONTINUED | OUTPATIENT
Start: 2025-02-11 | End: 2025-02-17 | Stop reason: HOSPADM

## 2025-02-11 RX ORDER — OXYCODONE HYDROCHLORIDE 5 MG/1
10 TABLET ORAL
Status: DISCONTINUED | OUTPATIENT
Start: 2025-02-11 | End: 2025-02-11

## 2025-02-11 RX ORDER — METHOCARBAMOL 500 MG/1
250 TABLET ORAL EVERY 6 HOURS PRN
Status: DISCONTINUED | OUTPATIENT
Start: 2025-02-11 | End: 2025-02-17 | Stop reason: HOSPADM

## 2025-02-11 RX ORDER — ACETAMINOPHEN 325 MG/1
650 TABLET ORAL EVERY 4 HOURS PRN
COMMUNITY

## 2025-02-11 RX ORDER — ACETAMINOPHEN 325 MG/1
975 TABLET ORAL ONCE
Status: COMPLETED | OUTPATIENT
Start: 2025-02-11 | End: 2025-02-11

## 2025-02-11 RX ADMIN — Medication 20 MG: at 10:09

## 2025-02-11 RX ADMIN — PHENYLEPHRINE HYDROCHLORIDE 0.3 MCG/KG/MIN: 10 INJECTION INTRAVENOUS at 10:19

## 2025-02-11 RX ADMIN — INSULIN ASPART 3 UNITS: 100 INJECTION, SOLUTION INTRAVENOUS; SUBCUTANEOUS at 18:24

## 2025-02-11 RX ADMIN — MIDAZOLAM HYDROCHLORIDE 1 MG: 1 INJECTION, SOLUTION INTRAMUSCULAR; INTRAVENOUS at 09:45

## 2025-02-11 RX ADMIN — GABAPENTIN 300 MG: 300 CAPSULE ORAL at 15:19

## 2025-02-11 RX ADMIN — FENTANYL CITRATE 50 MCG: 50 INJECTION INTRAMUSCULAR; INTRAVENOUS at 11:50

## 2025-02-11 RX ADMIN — LIDOCAINE HYDROCHLORIDE 5 ML: 10 INJECTION, SOLUTION EPIDURAL; INFILTRATION; INTRACAUDAL; PERINEURAL at 10:09

## 2025-02-11 RX ADMIN — Medication 5 MG: at 10:26

## 2025-02-11 RX ADMIN — BUPIVACAINE HYDROCHLORIDE 18 ML: 5 INJECTION, SOLUTION EPIDURAL; INTRACAUDAL at 09:54

## 2025-02-11 RX ADMIN — ONDANSETRON 4 MG: 2 INJECTION INTRAMUSCULAR; INTRAVENOUS at 10:09

## 2025-02-11 RX ADMIN — Medication 5 MG: at 12:40

## 2025-02-11 RX ADMIN — GABAPENTIN 300 MG: 300 CAPSULE ORAL at 19:44

## 2025-02-11 RX ADMIN — HYDROMORPHONE HYDROCHLORIDE 0.4 MG: 0.2 INJECTION, SOLUTION INTRAMUSCULAR; INTRAVENOUS; SUBCUTANEOUS at 14:39

## 2025-02-11 RX ADMIN — METHADONE HYDROCHLORIDE 5 MG: 5 TABLET ORAL at 19:44

## 2025-02-11 RX ADMIN — Medication 5 MG: at 11:19

## 2025-02-11 RX ADMIN — PROPOFOL 170 MG: 10 INJECTION, EMULSION INTRAVENOUS at 10:09

## 2025-02-11 RX ADMIN — Medication 5 MG: at 12:06

## 2025-02-11 RX ADMIN — PROPOFOL 150 MCG/KG/MIN: 10 INJECTION, EMULSION INTRAVENOUS at 10:09

## 2025-02-11 RX ADMIN — CEFAZOLIN SODIUM 2 G: 2 INJECTION, SOLUTION INTRAVENOUS at 18:22

## 2025-02-11 RX ADMIN — MICONAZOLE NITRATE: 20 CREAM TOPICAL at 19:43

## 2025-02-11 RX ADMIN — ACETAMINOPHEN 975 MG: 325 TABLET ORAL at 15:19

## 2025-02-11 RX ADMIN — BUPIVACAINE HYDROCHLORIDE 12 ML: 5 INJECTION, SOLUTION EPIDURAL; INTRACAUDAL at 09:46

## 2025-02-11 RX ADMIN — DEXMEDETOMIDINE HYDROCHLORIDE 8 MCG: 100 INJECTION, SOLUTION INTRAVENOUS at 11:56

## 2025-02-11 RX ADMIN — Medication 5 MG: at 12:31

## 2025-02-11 RX ADMIN — DEXAMETHASONE SODIUM PHOSPHATE 4 MG: 4 INJECTION, SOLUTION INTRA-ARTICULAR; INTRALESIONAL; INTRAMUSCULAR; INTRAVENOUS; SOFT TISSUE at 10:09

## 2025-02-11 RX ADMIN — DEXMEDETOMIDINE HYDROCHLORIDE 4 MCG: 100 INJECTION, SOLUTION INTRAVENOUS at 12:17

## 2025-02-11 RX ADMIN — ACETAMINOPHEN 975 MG: 325 TABLET ORAL at 07:59

## 2025-02-11 RX ADMIN — Medication 2 G: at 10:00

## 2025-02-11 RX ADMIN — HYDROMORPHONE HYDROCHLORIDE 4 MG: 4 TABLET ORAL at 18:24

## 2025-02-11 RX ADMIN — METHADONE HYDROCHLORIDE 5 MG: 5 TABLET ORAL at 14:59

## 2025-02-11 RX ADMIN — HYDROMORPHONE HYDROCHLORIDE 2 MG: 2 TABLET ORAL at 15:18

## 2025-02-11 RX ADMIN — ALBUMIN (HUMAN): 12.5 SOLUTION INTRAVENOUS at 12:37

## 2025-02-11 RX ADMIN — FENTANYL CITRATE 50 MCG: 50 INJECTION INTRAMUSCULAR; INTRAVENOUS at 09:45

## 2025-02-11 RX ADMIN — ACETAMINOPHEN 975 MG: 325 TABLET ORAL at 23:58

## 2025-02-11 RX ADMIN — DEXMEDETOMIDINE HYDROCHLORIDE 12 MCG: 100 INJECTION, SOLUTION INTRAVENOUS at 11:45

## 2025-02-11 RX ADMIN — ATORVASTATIN CALCIUM 40 MG: 40 TABLET, FILM COATED ORAL at 19:43

## 2025-02-11 RX ADMIN — SODIUM CHLORIDE, POTASSIUM CHLORIDE, SODIUM LACTATE AND CALCIUM CHLORIDE: 600; 310; 30; 20 INJECTION, SOLUTION INTRAVENOUS at 08:32

## 2025-02-11 RX ADMIN — FENTANYL CITRATE 50 MCG: 50 INJECTION INTRAMUSCULAR; INTRAVENOUS at 10:09

## 2025-02-11 RX ADMIN — SODIUM CHLORIDE, POTASSIUM CHLORIDE, SODIUM LACTATE AND CALCIUM CHLORIDE: 600; 310; 30; 20 INJECTION, SOLUTION INTRAVENOUS at 12:20

## 2025-02-11 ASSESSMENT — ACTIVITIES OF DAILY LIVING (ADL)
ADLS_ACUITY_SCORE: 66
ADLS_ACUITY_SCORE: 59
ADLS_ACUITY_SCORE: 66
ADLS_ACUITY_SCORE: 59
ADLS_ACUITY_SCORE: 59
ADLS_ACUITY_SCORE: 67
ADLS_ACUITY_SCORE: 59
ADLS_ACUITY_SCORE: 59
ADLS_ACUITY_SCORE: 66
ADLS_ACUITY_SCORE: 59
ADLS_ACUITY_SCORE: 67
ADLS_ACUITY_SCORE: 59
ADLS_ACUITY_SCORE: 66
ADLS_ACUITY_SCORE: 59
ADLS_ACUITY_SCORE: 59

## 2025-02-11 ASSESSMENT — ENCOUNTER SYMPTOMS
DYSRHYTHMIAS: 1
ORTHOPNEA: 0

## 2025-02-11 NOTE — BRIEF OP NOTE
[facility]    Brief Operative Note    Pre-operative diagnosis: PAD (peripheral artery disease) [I73.9]  Post-operative diagnosis Same as pre-operative diagnosis    Procedure: RIGHT ABOVE KNEE AMPUTATION, Right - Knee    Surgeon: Surgeons and Role:     * Clover Dorman DO - Primary  Anesthesia: General   Estimated Blood Loss: 100 mL from 2/11/2025 10:03 AM to 2/11/2025  1:20 PM      Drains: Lawrence-Mckinney  Specimens:   ID Type Source Tests Collected by Time Destination   1 : right above knee amputation Amputation, Non-Traumatic Leg, Above Knee, Right SURGICAL PATHOLOGY EXAM Clover Dorman DO 2/11/2025 11:51 AM    A : right thigh Tissue Leg, Right ANAEROBIC BACTERIAL CULTURE ROUTINE, GRAM STAIN, AEROBIC BACTERIAL CULTURE ROUTINE Clover Dorman DO 2/11/2025 11:41 AM    B : right proximal above knee Swab Knee, Right ANAEROBIC BACTERIAL CULTURE ROUTINE, GRAM STAIN, FUNGAL OR YEAST CULTURE ROUTINE, AEROBIC BACTERIAL CULTURE ROUTINE Clover Dorman DO 2/11/2025 11:44 AM      Findings:   No signs of ascending infection, poor tissue quality with muscle atrophy but viable muscle noted in thigh . Right AKA performed.   Complications: None.  Implants: * No implants in log *

## 2025-02-11 NOTE — H&P
Interval History & Physical:    Chief complaint: RLE wound    Patient s/e this AM, denies any changes to H&P since last performed.   Has been NPO since midnight.   Last dose of Eliquis on Friday 2/7/25.   Denies F/C/N/V/D/C/CP/SOB.  AFVSS.    RLE marked.  Consented for procedure and blood products.    All questions and concerns addressed and patient demonstrates understanding.  To OR today for R BKA revision, possible AKA.   Dispo plan: admit post-op.      Hilda Aceves MD  PGY-6  Vascular Surgery  Pager: [phone]    Please page me directly with any questions/concerns during regular weekday hours before 5PM. If there is no response, if it is a weekend, or if it is during evening hours, then please use the [employer] system to page the first-call resident on call for vascular surgery.   2/11/2025  9:07 AM

## 2025-02-11 NOTE — PROGRESS NOTES
[facility] ACUTE INPATIENT PAIN SERVICE    [facility], [facility], [facility], [facility], [place]   PAINConsult      Assessment/Plan:  Linda Loredo is a 79 year old female who was admitted on 2/11/2025.  I was asked by Navid Caceres MD  to see the patient for pain post R AKA. Admitted for planned amputation. History of CKD 3, constipation, osteomyelitis, depression, DM 2, fibromyalgia, insomnia, HTN, obesity, COPD, hyperlipidemia, opioid dependence, afib, Sees Viridiana Daly at [facility]. Has not been to in patient appt with Viridiana for a few months due to being in TCU and unable to get to appt. Hopes to get back soon to clinic.   shows: methadone 5mg qid (last 2/8/25--is only for BID but per MAR at TCU/nursing home, was taking QID recently), Dilaudid 2mg #38 for 4 d (last 1/18/25), Lyrica 25mg #60 for 30 (12/10/24): per patient not taking this anymore. Describes pain as aching and better than it was pre op.      PLAN:  Acute pain secondary to surgery, in the setting of chronic pain.     Multimodal Medication Therapy:   Adjuvants: APAP 975mg q8h, gabapentin 300mg TID: watch for sedation, robaxin 250mg q6hprn  Opioids: Dilaudid 0.2-0.4mg severe pain, Dilaudid 4mg q4hprn severe pain, oxycodone 5mg q4hprn moderate pain, : change to Dilaudid 2-4mg q3hprn for moderate to severe pain.   methadone 5mg qid  Non-medication interventions- Ice  Constipation Prophylaxis- miralax 17g BIDPRN  Follow up /Discharge Recommendations - We recommend prescribing the following at the time of discharge: TBD      Subjective:  Seen in PACU with visitor .   She is alert, in discomfort. Reports, however, that pain is better than it was pre op (as it was severe 10/10 over the past  few weeks with open wound).   Does not want to utilize oxycodone. Has had 'bad experience'. Will update post op opioid orders.     Has taken gabapentin and Lyrica in the past, but nothing currently , though she sounds confused about  this.   Will start oral dosing now, in PACU  History   Drug Use No         Tobacco Use      Smoking status: Never      Smokeless tobacco: Never        Objective:  Vital signs in last 24 hours:  B/P: 125/72, T: 97.5, P: 98, R: 11   Blood pressure 125/72, pulse 98, temperature 97.5  F (36.4  C), temperature source Temporal, resp. rate 11, weight 102.1 kg (225 lb), SpO2 99%.      Amrita Calloway, PharmD  Acute Care Pain Management  Team  Hours of pain coverage Mon-Fri 8-1600  After hours contact the primary team  [facility] (RASHID, [place], [place], RH)   Page via [employer] web console -Click for [employer]

## 2025-02-11 NOTE — ANESTHESIA POSTPROCEDURE EVALUATION
Patient: Linda Loredo    Procedure: Procedure(s):  RIGHT ABOVE KNEE AMPUTATION       Anesthesia Type:  General    Note:  Disposition: Admission   Postop Pain Control: Uneventful            Sign Out: Well controlled pain   PONV: No   Neuro/Psych: Uneventful            Sign Out: Acceptable/Baseline neuro status   Airway/Respiratory: Uneventful            Sign Out: Acceptable/Baseline resp. status   CV/Hemodynamics: Uneventful            Sign Out: Acceptable CV status; No obvious hypovolemia; No obvious fluid overload   Other NRE: NONE   DID A NON-ROUTINE EVENT OCCUR? No           Last vitals:  Vitals Value Taken Time   /72 02/11/25 1400   Temp 36.4  C (97.5  F) 02/11/25 1400   Pulse 101 02/11/25 1404   Resp 6 02/11/25 1404   SpO2 99 % 02/11/25 1404   Vitals shown include unfiled device data.    Electronically Signed By: Khalida Gaona MD  February 11, 2025  2:08 PM

## 2025-02-11 NOTE — ANESTHESIA PROCEDURE NOTES
Femoral Procedure Note    Pre-Procedure   Staff -        Anesthesiologist:  Ish Botello MD       Performed By: anesthesiologist       Location: pre-op       Procedure Start/Stop Times: 2/11/2025 9:46 AM and 2/11/2025 9:49 AM       Pre-Anesthestic Checklist: patient identified, IV checked, site marked, risks and benefits discussed, informed consent, monitors and equipment checked, pre-op evaluation, at physician/surgeon's request and post-op pain management  Timeout:       Correct Patient: Yes        Correct Procedure: Yes        Correct Site: Yes        Correct Position: Yes        Correct Laterality: Yes        Site Marked: Yes  Procedure Documentation  Procedure: Femoral         Laterality: right       Patient Position: supine       Patient Prep/Sterile Barriers: sterile gloves, mask       Skin prep: Chloraprep       Needle Type: short bevel       Needle Gauge: 20.        Needle Length (Inches): 4        Ultrasound guided       1. Ultrasound was used to identify targeted nerve, plexus, vascular marker, or fascial plane and place a needle adjacent to it in real-time.       2. Ultrasound was used to visualize the spread of anesthetic in close proximity to the above referenced structure.       3. A permanent image is entered into the patient's record.       4. The visualized anatomic structures appeared normal.       5. There were no apparent abnormal pathologic findings.    Assessment/Narrative         The placement was negative for: blood aspirated, painful injection and site bleeding       Paresthesias: No.       Bolus given via needle..        Secured via.        Insertion/Infusion Method: Single Shot       Complications: none       Injection made incrementally with aspirations every 5 mL.    Medication(s) Administered   Bupivacaine 0.5% PF (Infiltration) - Infiltration   12 mL - 2/11/2025 9:46:00 AM  Medication Administration Time: 2/11/2025 9:46 AM      FOR [facility] ([facility]) ONLY:   Pain Team  "Contact information: please page the Pain Team Via [facility]. Search \"Pain\". During daytime hours, please page the attending first. At night please page the resident first.      "

## 2025-02-11 NOTE — CONSULTS
[facility]  Consult Note - Hospitalist Service  Date of Admission:  2/11/2025  Consult Requested by: Vascular surgery  Reason for Consult: Comanagement of medical comorbidities.  Patient    Assessment & Plan   Linda Loredo is a 79 year old female admitted on 2/11/2025. She has a past medical history significant for type 2 diabetes, CKD stage III, chronic pain syndrome, paroxysmal A-fib, chronic right-sided heart failure, peripheral arterial disease, and BERLIN who had a recent guillotine below-knee amputation complicated by poor wound healing admitted for right above-knee amputation.  Medicine was consulted for medical comanagement.    PAD  S/P R. AKA  -Analgesics, antiemetics, DVT prophylaxis, and therapies per surgical team   -PTA statin    History of A-fib  -PTA apixaban once safe to do so from surgical standpoint  -Is not on any rate controlling therapy or antiarrhythmics  -Monitor for any signs of tachycardia    Type 2 diabetes  -Medium dose sliding scale insulin will titrate as needed    HFpEF  -PTA torsemide holding parameters    Chronic pain  -Agree with pain team consult, pain otherwise per primary team    CKD stage III  -Avoid nephrotoxins trend    Hx of BERLIN  -Does not use a CPAP but she cannot tolerate it, consider pulse ox if patient heavily sedated at risk for postop hypoventilation     Clinically Significant Risk Factors Present on Admission        # Hyperkalemia: Highest K = 5.9 mmol/L in last 2 days, will monitor as appropriate   # Hypochloremia: Lowest Cl = 95 mmol/L in last 2 days, will monitor as appropriate       # Drug Induced Coagulation Defect: home medication list includes an anticoagulant medication    # Hypertension: Noted on problem list      # Anemia: based on hgb <11      # DMII: A1C = 7.2 % (Ref range: <5.7 %) within past 6 months       # Financial/Environmental Concerns:           Tre Perez MD  Hospitalist Service  Securely message with [email or web address]  (more info)  Text page via [facility] Paging/Directory   ______________________________________________________________________    Chief Complaint   Post-op    History is obtained from the patient    History of Present Illness   Linda Loredo is a 79 year old female admitted on 2/11/2025. She has a past medical history significant for type 2 diabetes, CKD stage III, chronic pain syndrome, paroxysmal A-fib, chronic right-sided heart failure, peripheral arterial disease, and BERLIN who had a recent guillotine below-knee amputation complicated by poor wound healing admitted for right above-knee amputation.  Medicine was consulted for medical comanagement. Evaluated patient at bedside, introduced myself, explained the roll of hospital medicine, medically co-managing along side the primary team. Explained that the primary team is usually managing everything regarding the surgery, including any pain medications, anti nausea, as well as therapies and anything else related to the procedure. Explained that we as hospitalists manage any medical conditions that already exist or may arise during your stay. Explained any and all questions to the best of my ability. Post operatively she is doing well aside from some pain which nursing is working on, family is also bedside and both them and patient denies any other questions or concerns.        Past Medical History    Past Medical History:   Diagnosis Date    Abscess of left foot 10/31/2022    Acute cystitis without hematuria 07/11/2024    Acute kidney injury 07/11/2024    Adverse effect of anticoagulants, initial encounter 04/16/2024    GUSTAVO (acute kidney injury) 05/26/2023    Allergic rhinitis 04/08/2008    Anemia 07/11/2024    Anticoagulation monitoring, INR range 2-3 07/06/2022    Anxiety 10/20/2011    Cardiac murmur, unspecified 05/29/2019    Cellulitis - bilateral lower extremities 05/27/2023    Cellulitis of buttock 05/26/2023    Chronic atrial fibrillation (H)  2022    New onset during 2022 admission      Chronic kidney disease, stage 3b (H) 2024    Chronic pain 2010    Chronic right-sided heart failure (H) 2023    Constipation 2024    Decubitus ulcers - 5 present on buttocks/perineal region, numerous scabbed over and unstagable on shin and bilateral feet with some bloody blisters noted on feet 2023    Depressive disorder, not elsewhere classified     Diabetic foot ulcer (H) 2023    Diabetic polyneuropathy associated with type 2 diabetes mellitus (H) 2006: on gabapentin.  She has been switched from gabapentin to lyrica.       Elevated liver enzymes 10/20/2011    Essential hypertension with goal blood pressure less than 140/90 2005    Fibromyalgia 10/20/2011    Fracture of fibula, distal, left, closed 10/20/2011    ORIF 10/13/11      Herpes zoster 2005: On gabapentin and lidoderm. She has been switched from gabapentin to lyrica.       Herpes zoster without mention of complication     Hx of osteomyelitis 2024    S.p left BKA       Hypercalcemia 2007    Hypoglycemia 2023    Hypotension 10/27/2011    Insomnia 07/15/2005    Lymphedema, not elsewhere classified 2024    Major depressive disorder, recurrent episode, moderate (H) 2008    Metabolic encephalopathy 2024    Microalbuminuria due to type 2 diabetes mellitus (H) 10/25/2016    Mild intermittent asthma     Mixed hyperlipidemia 2005    Mixed hyperlipidemia due to type 2 diabetes mellitus (H) 2008    Formatting of this note is different from the original.     22 ASCVD 10 year risk: 37.9%      Last Lipids:  Last Lipids:  Chol: 2021 130   63  HDL: 2021 46  Non-HDL: 2021 84  Chol/HDL Ratio: 2021 2.83  LDL DIRECT: . No results found in past 5 years   LDL CHOLESTEROL (mg/dL)   Date Value   2021 71      22   The 10-year ASCVD  risk score (Waverly LUIS Jr.    Mixed stress and urge urinary incontinence 05/09/2005 February 26, 2007: On Detrol LA.      Non-healing wound of left heel 09/22/2023    Obesity with BMI >35 and comorbidity 03/13/2006 February 26, 2007: Body mass index is 48.07 kg/(m^2).       Opioid dependence, uncomplicated (H) 04/26/2023    BERLIN (obstructive sleep apnea) 10/23/2007    Sleep study 2004 reportedly showing severe OBSTRUCTIVE SLEEP APNEA and recommend CPAP, Not available for review. Patient is untreated       Osteoarthritis 09/18/2006    Osteomyelitis (H) 10/24/2023    Osteopenia 05/20/2015    Other abnormalities of gait and mobility 11/15/2023    Other urinary incontinence     Pressure injury of deep tissue of sacral region 07/11/2024    Primary hyperparathyroidism 01/23/2013    Work up January 2013 Dr. 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       Supratherapeutic INR 05/27/2023    Type 2 diabetes mellitus with complication, with long-term current use of insulin (H) 04/18/2005    diagnosed in 2001       Type II or unspecified type diabetes mellitus with renal manifestations, uncontrolled(250.42) (H)     Type II or unspecified type diabetes mellitus without mention of complication, not stated as uncontrolled     Umbilical hernia without obstruction and without gangrene 12/13/2018    Unspecified essential hypertension     Unspecified open wound, left foot, subsequent encounter  09/22/2023    UTI (urinary tract infection) - possible 05/26/2023    Venous stasis 04/16/2024    Vitamin D deficiency 09/08/2022    Warfarin-induced coagulopathy 05/27/2023       Past Surgical History   Past Surgical History:   Procedure Laterality Date    AMPUTATE LEG BELOW KNEE Left 10/7/2023    Procedure: LEFT GUILLOTINE BELOW KNEE AMPUTATION.;  Surgeon: Lan Otto MD;  Location: [facility] OR    AMPUTATE LEG BELOW KNEE Left 10/14/2023    Procedure: debridement of left below the knee amputation;  Surgeon: Lan Otto MD;  Location: [facility] OR    AMPUTATION, LOWER EXTREMITY, USING GUILLOTINE TECHNIQUE Right 11/14/2024    Procedure: AMPUTATION, RIGHT LOWER EXTREMITY, USING GUILLOTINE TECHNIQUE;  Surgeon: Tierney Sanches MD;  Location: [facility] OR    APPLY WOUND VAC Left 1/2/2024    Procedure: WOUND VAC APPLICATION TO LEFT BELOW KNEE STUMP;  Surgeon: Lan Otto MD;  Location:  OR    CHOLECYSTECTOMY      GRAFT SKIN SPLIT THICKNESS FROM TRUNK TO HEAD Left 1/2/2024    Procedure: APPLICATION OF SPLIT-THICKNESS SKIN GRAFT TO LEFT BELOW KNEE STUMP, GRAFT FROM LEFT THIGH;  Surgeon: Lan Otto MD;  Location: [facility] OR    INCISION AND DRAINAGE FOOT, COMBINED Left 9/26/2023    Procedure: Surgical debridement of all nonviable skin and soft tissue and bone left foot;  Surgeon: Nolberto Thorne DPM;  Location: [facility] OR    IR LOWER EXTREMITY ANGIOGRAM LEFT  10/3/2023    IRRIGATION AND DEBRIDEMENT LOWER EXTREMITY, COMBINED Right 7/14/2024    Procedure: IRRIGATION AND DEBRIDEMENT, LOWER EXTREMITY, RIGHT LOWER LEG;  Surgeon: Phuong Gutierrez MD;  Location: [facility] OR    ligation of fallopian tube      OPEN REDUCTION INTERNAL FIXATION ANKLE  10/13/11    left    PICC SINGLE LUMEN PLACEMENT  11/18/2024    pinning of left 2nd toe         Medications   I have reviewed this patient's current medications          Physical Exam   Vital Signs: Temp: 97.5  F (36.4  C) Temp src: Temporal BP: (!)  "144/72 Pulse: 102   Resp: 11 SpO2: 100 % O2 Device: Nasal cannula Oxygen Delivery: 2 LPM  Weight: 225 lbs 0 oz    Gen: Appears uncomfortable in pain, but otherwise no acute distress  Card:Warm well perfused, pulses assumed patient talking  Pulm: Normal I/E effort on nasal cannula  Abd:Non distended  Skin:No obvious rashes or lesions on exposed areas of skin  Neuro AxOx4, S/S grossly intact, no obvious FND,   Psych:Pleasant, answering questions appropriately, insight good, judgement good, does not appear to be responding to I/E stimuli     Medical Decision Making       60 MINUTES SPENT BY ME on the date of service doing chart review, history, exam, documentation & further activities per the note.      Data   ------------------------- PAST 24 HR DATA REVIEWED -----------------------------------------------    I have personally reviewed the following data over the past 24 hrs:    8.2  \   10.6 (L)   / 171     137 95 (L) 21.9 /  166 (H)   3.8 34 (H) 0.74 \     TSH: N/A T4: N/A A1C: 7.2 (H)       Imaging results reviewed over the past 24 hrs:   Recent Results (from the past 24 hours)   POC US Guidance Needle Placement    Narrative    Ultrasound was performed as guidance to an anesthesia procedure.  Click   \"PACS images\" hyperlink below to view any stored images.  For specific   procedure details, view procedure note authored by anesthesia.   POC US Guidance Needle Placement    Narrative    Ultrasound was performed as guidance to an anesthesia procedure.  Click   \"PACS images\" hyperlink below to view any stored images.  For specific   procedure details, view procedure note authored by anesthesia.     "

## 2025-02-11 NOTE — ANESTHESIA PREPROCEDURE EVALUATION
Anesthesia Pre-Procedure Evaluation    Patient: iLnda Loredo   MRN: [id number] : 1945        Procedure : Procedure(s):  RIGHT ABOVE KNEE AMPUTATION  POSSIBLE BELOW KNEE AMPUTATION          Past Medical History:   Diagnosis Date    Abscess of left foot 10/31/2022    Acute cystitis without hematuria 2024    Acute kidney injury 2024    Adverse effect of anticoagulants, initial encounter 2024    GUSTAVO (acute kidney injury) 2023    Allergic rhinitis 2008    Anemia 2024    Anticoagulation monitoring, INR range 2-3 2022    Anxiety 10/20/2011    Cardiac murmur, unspecified 2019    Cellulitis - bilateral lower extremities 2023    Cellulitis of buttock 2023    Chronic atrial fibrillation (H) 2022    New onset during 2022 admission      Chronic kidney disease, stage 3b (H) 2024    Chronic pain 2010    Chronic right-sided heart failure (H) 2023    Constipation 2024    Decubitus ulcers - 5 present on buttocks/perineal region, numerous scabbed over and unstagable on shin and bilateral feet with some bloody blisters noted on feet 2023    Depressive disorder, not elsewhere classified     Diabetic foot ulcer (H) 2023    Diabetic polyneuropathy associated with type 2 diabetes mellitus (H) 2006: on gabapentin.  She has been switched from gabapentin to lyrica.       Elevated liver enzymes 10/20/2011    Essential hypertension with goal blood pressure less than 140/90 2005    Fibromyalgia 10/20/2011    Fracture of fibula, distal, left, closed 10/20/2011    ORIF 10/13/11      Herpes zoster 2005: On gabapentin and lidoderm. She has been switched from gabapentin to lyrica.       Herpes zoster without mention of complication     Hx of osteomyelitis 2024    S.p left BKA       Hypercalcemia 2007    Hypoglycemia 2023    Hypotension 10/27/2011     Insomnia 07/15/2005    Lymphedema, not elsewhere classified 2024    Major depressive disorder, recurrent episode, moderate (H) 2008    Metabolic encephalopathy 2024    Microalbuminuria due to type 2 diabetes mellitus (H) 10/25/2016    Mild intermittent asthma     Mixed hyperlipidemia 2005    Mixed hyperlipidemia due to type 2 diabetes mellitus (H) 2008    Formatting of this note is different from the original.     22 ASCVD 10 year risk: 37.9%      Last Lipids:  Last Lipids:  Chol: 2021 130   63  HDL: 2021 46  Non-HDL: 2021 84  Chol/HDL Ratio: 2021 2.83  LDL DIRECT: . No results found in past 5 years   LDL CHOLESTEROL (mg/dL)   Date Value   2021 71      22   The 10-year ASCVD risk score (Teddy SMITH Jr.    Mixed stress and urge urinary incontinence 2005: On Detrol LA.      Non-healing wound of left heel 2023    Obesity with BMI >35 and comorbidity 2006: Body mass index is 48.07 kg/(m^2).       Opioid dependence, uncomplicated (H) 2023    BERLIN (obstructive sleep apnea) 10/23/2007    Sleep study 2004 reportedly showing severe OBSTRUCTIVE SLEEP APNEA and recommend CPAP, Not available for review. Patient is untreated       Osteoarthritis 2006    Osteomyelitis (H) 10/24/2023    Osteopenia 2015    Other abnormalities of gait and mobility 11/15/2023    Other urinary incontinence     Pressure injury of deep tissue of sacral region 2024    Primary hyperparathyroidism 2013    Work up 2013 Dr. Villareal      Pulmonary hypertension (H) 2024    Pulmonary hypertension by echo and mild to moderate pulmonary hypertension by angiogram       Sepsis without acute organ dysfunction, due to unspecified organism (H) 2023    Sepsis, due to unspecified organism, unspecified whether acute organ dysfunction present (H) 2024    Skin ulcer of right  lower leg with fat layer exposed (H) 02/26/2024    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) 07/11/2024    left lower extremity amputation secondary to osteomyelitis 10/2023, bone biopsy grew MRSA.       Supratherapeutic INR 05/27/2023    Type 2 diabetes mellitus with complication, with long-term current use of insulin (H) 04/18/2005    diagnosed in 2001       Type II or unspecified type diabetes mellitus with renal manifestations, uncontrolled(250.42) (H)     Type II or unspecified type diabetes mellitus without mention of complication, not stated as uncontrolled     Umbilical hernia without obstruction and without gangrene 12/13/2018    Unspecified essential hypertension     Unspecified open wound, left foot, subsequent encounter 09/22/2023    UTI (urinary tract infection) - possible 05/26/2023    Venous stasis 04/16/2024    Vitamin D deficiency 09/08/2022    Warfarin-induced coagulopathy 05/27/2023      Past Surgical History:   Procedure Laterality Date    AMPUTATE LEG BELOW KNEE Left 10/7/2023    Procedure: LEFT GUILLOTINE BELOW KNEE AMPUTATION.;  Surgeon: Lan Otto MD;  Location:  OR    AMPUTATE LEG BELOW KNEE Left 10/14/2023    Procedure: debridement of left below the knee amputation;  Surgeon: Lan Otto MD;  Location:  OR    AMPUTATION, LOWER EXTREMITY, USING GUILLOTINE TECHNIQUE Right 11/14/2024    Procedure: AMPUTATION, RIGHT LOWER EXTREMITY, USING GUILLOTINE TECHNIQUE;  Surgeon: Tierney Sanches MD;  Location: [facility]    APPLY WOUND VAC Left 1/2/2024    Procedure: WOUND VAC APPLICATION TO LEFT BELOW KNEE STUMP;  Surgeon: Lan Otto MD;  Location:  OR    CHOLECYSTECTOMY      GRAFT SKIN SPLIT THICKNESS FROM TRUNK TO HEAD Left 1/2/2024    Procedure: APPLICATION OF SPLIT-THICKNESS SKIN GRAFT TO LEFT BELOW KNEE STUMP, GRAFT FROM LEFT THIGH;  Surgeon: Lan Otto MD;  Location:  OR    INCISION AND DRAINAGE FOOT,  COMBINED Left 9/26/2023    Procedure: Surgical debridement of all nonviable skin and soft tissue and bone left foot;  Surgeon: Nolberto Thorne DPM;  Location: [facility] OR    IR LOWER EXTREMITY ANGIOGRAM LEFT  10/3/2023    IRRIGATION AND DEBRIDEMENT LOWER EXTREMITY, COMBINED Right 7/14/2024    Procedure: IRRIGATION AND DEBRIDEMENT, LOWER EXTREMITY, RIGHT LOWER LEG;  Surgeon: Phuong Gutierrez MD;  Location: [facility] OR    ligation of fallopian tube      OPEN REDUCTION INTERNAL FIXATION ANKLE  10/13/11    left    PICC SINGLE LUMEN PLACEMENT  11/18/2024    pinning of left 2nd toe        Allergies   Allergen Reactions    Prednisone Nausea and Vomiting    Morphine Nausea and Vomiting    Morphine [Fumaric Acid] Nausea and Vomiting    Nitrofurantoin Unknown     Per nursing home      Social History     Tobacco Use    Smoking status: Never    Smokeless tobacco: Never   Substance Use Topics    Alcohol use: No      Wt Readings from Last 1 Encounters:   02/11/25 102.1 kg (225 lb)        Anesthesia Evaluation   Pt has had prior anesthetic.     No history of anesthetic complications       ROS/MED HX  ENT/Pulmonary:     (+) sleep apnea,          allergic rhinitis,           asthma                  Neurologic: Comment: Metabolic encephalopathy       Cardiovascular: Comment: Denies CP and SOB    TTE 11/2024:  Interpretation Summary     1. Normal left ventricular size and systolic performance with a visually  estimated ejection fraction of 60%.  2. No significant valvular heart disease is identified on this study.  3. Mild right ventricular enlargement with normal right ventricular systolic  performance.  4. There is mild biatrial enlargement.      (+) Dyslipidemia hypertension- Peripheral Vascular Disease-   -  - -   Taking blood thinners   CHF (Right sided)  Last EF: 60 date: 05/2022               dysrhythmias, a-fib,  valvular problems/murmurs type: MR TR.   pulmonary hypertension, Previous cardiac testing   Echo: Date: 05/31/2022  Results:  Echo 11/2024  1. Normal left ventricular size and systolic performance with a visually  estimated ejection fraction of 60%.  2. No significant valvular heart disease is identified on this study.  3. Mild right ventricular enlargement with normal right ventricular systolic  performance.  4. There is mild biatrial enlargement.       Stress Test:  Date: Results:    ECG Reviewed:  Date: 07/11/2024 Results:  Atrial fibrillation   -Inferior infarct -age undetermined -Old anterior infarct.    ABNORMAL  Cath:  Date: Results:   (-) CAD, orthopnea/PND and murmur   METS/Exercise Tolerance:     Hematologic: Comments: INR 1.7    (+)      anemia,          Musculoskeletal: Comment: Osteoarthritis  Left BKA  Pressure ulcers  (+)  arthritis,             GI/Hepatic:       Renal/Genitourinary: Comment: Mixed stress and urge urinary incontinence   Microalbinuria      (+) renal disease, type: ARF and CRI,            Endo: Comment: hyperparathyroidism    (+)  type II DM, Last HgA1c: 9.9, date: 03/2024, Using insulin,          Obesity,    Type I DM: 9.9.   Psychiatric/Substance Use:     (+) psychiatric history depression and anxiety  H/O chronic opiod use .     Infectious Disease: Comment: R foot infection, MRSA    (+)   MRSA (09/23/23),         Malignancy:       Other: Comment: Lymphedema     (+)  , H/O Chronic Pain (fibromyalgia),         Physical Exam    Airway        Mallampati: I   TM distance: > 3 FB   Neck ROM: full   Mouth opening: > 3 cm    Respiratory Devices and Support         Dental       (+) Multiple visibly decayed, broken teeth      Cardiovascular          Rhythm and rate: regular and normal (-) no murmur    Pulmonary           breath sounds clear to auscultation           OUTSIDE LABS:  CBC:   Lab Results   Component Value Date    WBC 8.2 02/11/2025    WBC 6.3 01/02/2025    HGB 10.6 (L) 02/11/2025    HGB 10.6 (L) 01/27/2025    HCT 33.4 (L) 02/11/2025    HCT 38.7 01/02/2025     02/11/2025      01/02/2025     BMP:   Lab Results   Component Value Date     02/11/2025     01/27/2025    POTASSIUM 5.9 (H) 02/11/2025    POTASSIUM 3.8 01/27/2025    CHLORIDE 95 (L) 02/11/2025    CHLORIDE 99 01/27/2025    CO2 34 (H) 02/11/2025    CO2 34 (H) 01/27/2025    BUN 21.9 02/11/2025    BUN 18.1 01/27/2025    CR 0.74 02/11/2025    CR 0.68 01/27/2025     (H) 02/11/2025     (H) 02/11/2025     COAGS:   Lab Results   Component Value Date    PTT 23 06/16/2006    INR 1.73 (H) 11/13/2024     POC:   Lab Results   Component Value Date     (H) 04/27/2006     HEPATIC:   Lab Results   Component Value Date    ALBUMIN 2.8 (L) 11/15/2024    PROTTOTAL 7.0 07/29/2024    ALT 16 07/29/2024    AST 16 07/29/2024    ALKPHOS 94 07/29/2024    BILITOTAL 0.3 07/29/2024     OTHER:   Lab Results   Component Value Date    LACT 1.5 11/12/2024    A1C 8.2 (H) 12/10/2024    ZAKIA 9.9 02/11/2025    PHOS 2.7 11/15/2024    MAG 2.0 11/15/2024    TSH 3.46 11/24/2023    T4 1.20 11/24/2023    CRP 16.0 (H) 03/28/2008    SED 91 (H) 07/28/2024       Anesthesia Plan    ASA Status:  3    NPO Status:  NPO Appropriate    Anesthesia Type: General.     - Airway: LMA   Induction: Intravenous.   Maintenance: Balanced.        Consents    Anesthesia Plan(s) and associated risks, benefits, and realistic alternatives discussed. Questions answered and patient/representative(s) expressed understanding.     - Discussed:     - Discussed with:  Patient      - Extended Intubation/Ventilatory Support Discussed: No.      - Patient is DNR/DNI Status: No          Postoperative Care    Pain management: IV analgesics, Oral pain medications, Peripheral nerve block (Single Shot), Multi-modal analgesia.   PONV prophylaxis: Ondansetron (or other 5HT-3), Background Propofol Infusion     Comments:    Other Comments: Preop glucose 152.    Plan: GA with LMA, preop tylenol and preop Fem/Sciatic block. Ketamine and precedex. Taking home methadone as scheduled. PONV ppx,  fentanyl/dilaudid PRN.           Khalida Gaona MD    I have reviewed the pertinent notes and labs in the chart from the past 30 days and (re)examined the patient.  Any updates or changes from those notes are reflected in this note.    Clinically Significant Risk Factors Present on Admission        # Hyperkalemia: Highest K = 5.9 mmol/L in last 2 days, will monitor as appropriate   # Hypochloremia: Lowest Cl = 95 mmol/L in last 2 days, will monitor as appropriate       # Drug Induced Coagulation Defect: home medication list includes an anticoagulant medication    # Hypertension: Noted on problem list      # Anemia: based on hgb <11      # DMII: A1C = 8.2 % (Ref range: <5.7 %) within past 6 months       # Financial/Environmental Concerns:

## 2025-02-11 NOTE — PHARMACY-ADMISSION MEDICATION HISTORY
Pharmacist Admission Medication History    Admission medication history is complete. The information provided in this note is only as accurate as the sources available at the time of the update.    Information Source(s): Facility (U/NH/) medication list/MAR via N/A    Pertinent Information: [facility] MAR    Pt verbally states she had one dose of methadone this am, MAR does not reflect this    Changes made to PTA medication list:  Added: zinc po, miconazole topical, nystatin powder  Deleted: melatonin, flagyl, lyrica, senna-s  Changed: tresiba dosing; tylenol to prn    Allergies reviewed with patient and updates made in EHR: yes    Medication History Completed By: Courtney Thao [facility] 2/11/2025 9:26 AM    PTA Med List   Medication Sig Last Dose/Taking    acetaminophen (TYLENOL) 325 MG tablet Take 650 mg by mouth every 4 hours as needed for mild pain. Unknown    apixaban ANTICOAGULANT (ELIQUIS) 5 MG tablet Take 1 tablet (5 mg) by mouth 2 times daily. 2/7/2025 Evening    atorvastatin (LIPITOR) 40 MG tablet Take 1 tablet (40 mg) by mouth every evening 2/10/2025 at  8:00 PM    calcium carbonate (TUMS) 500 MG chewable tablet Take 1 tablet (500 mg) by mouth 4 times daily as needed for heartburn. Unknown    HYDROmorphone (DILAUDID) 2 MG tablet Take 2 tablets (4 mg) by mouth every 4 hours as needed for breakthrough pain (please give 1 hour before dressing change every 5 days). 2/7/2025    Insulin Aspart FlexPen 100 UNIT/ML SOPN Inject 5 Units subcutaneously 3 times daily (before meals). 2/10/2025 Evening    insulin degludec (TRESIBA) 200 UNIT/ML pen Inject 30 Units subcutaneously every morning. Hold for BG < 100 2/10/2025 Morning    lidocaine (LMX4) 4 % external cream Apply topically 3 times daily. Apply to lower extremity for pain 2/10/2025    methadone (DOLOPHINE) 5 MG tablet Take 1 tablet (5 mg) by mouth 4 times daily. 2/11/2025 Morning    miconazole (MICATIN) 2 % external cream Apply topically 2 times daily.  Apply to buttock, groin, thigh for redness until healed 2/10/2025 Bedtime    miconazole (MICATIN) 2 % external cream Apply topically every 12 hours as needed for other (to buttock, groin, thigh as needed for redness). Unknown    mineral oil-hydrophilic petrolatum (AQUAPHOR) external ointment Apply to left thigh wound BID 2/10/2025 Bedtime    nystatin (MYCOSTATIN) 621691 UNIT/GM external powder Apply topically 2 times daily. Apply to affected area for redness under left breast 2/10/2025 Evening    ondansetron (ZOFRAN ODT) 4 MG ODT tab Take 1 tablet (4 mg) by mouth every 6 hours as needed for nausea or vomiting. Unknown    polyethylene glycol (MIRALAX) 17 g packet Take 17 g by mouth 2 times daily as needed for constipation. Unknown    torsemide (DEMADEX) 20 MG tablet Take 60 mg by mouth daily 2/10/2025 Morning    wound support modular (EXPEDITE) LIQD bottle Take 60 mLs by mouth daily Past Week    Zinc oxide 10 % CREA Externally apply topically 3 times daily Apply to coccyx area once per shift after brief changes 2/10/2025    zinc sulfate (ZINCATE) 220 (50 Zn) MG capsule Take 220 mg by mouth daily. 2/10/2025 Morning

## 2025-02-11 NOTE — ANESTHESIA CARE TRANSFER NOTE
Patient: Linda Loredo    Procedure: Procedure(s):  RIGHT ABOVE KNEE AMPUTATION       Diagnosis: PAD (peripheral artery disease) [I73.9]  Diagnosis Additional Information: No value filed.    Anesthesia Type:   General     Note:    Oropharynx: oropharynx clear of all foreign objects  Level of Consciousness: drowsy  Oxygen Supplementation: face mask  Level of Supplemental Oxygen (L/min / FiO2): 8  Independent Airway: airway patency satisfactory and stable    Vital Signs Stable: post-procedure vital signs reviewed and stable  Report to RN Given: handoff report given  Patient transferred to: PACU    Handoff Report: Identifed the Patient, Identified the Reponsible Provider, Reviewed the pertinent medical history, Discussed the surgical course, Reviewed Intra-OP anesthesia mangement and issues during anesthesia, Set expectations for post-procedure period and Allowed opportunity for questions and acknowledgement of understanding      Vitals:  Vitals Value Taken Time   /76 02/11/25 1326   Temp 37  C (98.6  F) 02/11/25 1325   Pulse 105 02/11/25 1326   Resp 14 02/11/25 1325   SpO2 100 % 02/11/25 1326   Vitals shown include unfiled device data.    Electronically Signed By: MOE LORENZO CRNA  February 11, 2025  1:27 PM

## 2025-02-11 NOTE — ANESTHESIA PROCEDURE NOTES
Airway       Patient location during procedure: OR  Staff -        Anesthesiologist:  Khalida Gaona MD       CRNA: Elda Kaur APRN CRNA       Other Anesthesia Staff: Susi Salomon       Performed By: SRNA  Consent for Airway        Urgency: elective  Indications and Patient Condition       Indications for airway management: sreekanth-procedural       Induction type:intravenous       Mask difficulty assessment: 0 - not attempted    Final Airway Details       Final airway type: supraglottic airway    Supraglottic Airway Details        Type: LMA       Brand: [id number] AuraGain       LMA size: 5    Post intubation assessment        Placement verified by: capnometry, equal breath sounds and chest rise        Number of attempts at approach: 1       Number of other approaches attempted: 0       Secured with: tape and commercial tube frerer       Ease of procedure: easy       Dentition: Intact and Unchanged

## 2025-02-11 NOTE — BRIEF OP NOTE
[facility]    Brief Operative Note    Pre-operative diagnosis: PAD (peripheral artery disease) [I73.9]  Post-operative diagnosis Non-healing right calf wound    Procedure: RIGHT ABOVE KNEE AMPUTATION, Right - Knee    Surgeon: Surgeons and Role:     * Clover Dorman DO - Primary  Hilda Aceves MD - Fellow, PGY-6    Anesthesia: General   Estimated Blood Loss: 100 mL from 2/11/2025 10:03 AM to 2/11/2025  1:20 PM    Drains:   19 flat Jordon drain to subfascial right lower extremity    Specimens:   ID Type Source Tests Collected by Time Destination   1 : right above knee amputation Amputation, Non-Traumatic Leg, Above Knee, Right SURGICAL PATHOLOGY EXAM Clover Dorman DO 2/11/2025 11:51 AM    A : right thigh Tissue Leg, Right ANAEROBIC BACTERIAL CULTURE ROUTINE, GRAM STAIN, AEROBIC BACTERIAL CULTURE ROUTINE Clover Dorman DO 2/11/2025 11:41 AM    B : right proximal above knee Swab Knee, Right ANAEROBIC BACTERIAL CULTURE ROUTINE, GRAM STAIN, FUNGAL OR YEAST CULTURE ROUTINE, AEROBIC BACTERIAL CULTURE ROUTINE Clover Dorman DO 2/11/2025 11:44 AM      Findings: Below-knee leg wound too extensive to create below-knee flap. Furthermore, patient already had an early contracture at the knee, and is non-ambulatory. Decision made to proceed with AKA. Right thigh edematous with some venous congestion. Tourniquet used during case. Tension-free multi-layer closure.     Complications: None.    Implants: * No implants in log *      Hilda Aceves MD  PGY-6  Vascular Surgery  Pager: [phone]    Please page me directly with any questions/concerns during regular weekday hours before 5PM. If there is no response, if it is a weekend, or if it is during evening hours, then please use the [facility] system to page the first-call resident on call for vascular surgery.

## 2025-02-11 NOTE — OP NOTE
Spouse states she and pt both have different plane and train tickets over the next couple months and she is requesting a note advising against travel for both of them, as she is his primary caregiver. She is requesting the note mention his upcoming nephrectomy and the fact that he was admitted yesterday. Letter drafted and sent to portal. Also stamped copies were placed by the  for spouse to .    VASCULAR SURGERY OPERATIVE REPORT        LOCATION:    [facility]    Linda Loredo   Medical Record #:  [id number]  YOB: 1945  Age:  79 year old     Date of Service: 2/11/2015    PRIMARY CARE PROVIDER: Services, [facility]      Procedure: right above the knee amputation completion    Anesthesia:    General with block    Preprocedure diagnosis:   Osteomyelitis right lower extremity  Type 2 diabetes  Hypertension  Hyperlipidemia  Previous left below knee amputation  Chronic lymphedema bilateral legs  Chronic skin ulceration right leg  Atrial fibrillation  History of right guillotine amputation    Postprocedural diagnosis: Same    Surgeon: Clover STANTON  Assistant: Hilda Aceves MD PGY 6 vascular surgery fellow    A first assistant actively participated and was necessary for one or more of the following: opening, exposure and visualization during the case, maintaining hemostasis, wound closure resulting in its safe and expeditious completion.      Findings: Viable muscle in right thigh without evidence of ascending infection, no purulent drainage. Punctate bleeding from muscle surfaces and subcutaneous tissue. Poor tissue quality with friable adipose and muscular tissue, degeneration of muscle tissue noted likely secondary to immobility with flexion contracture noted in right knee. No gross signs of osteomyelitis.    Estimated blood loss: 100cc    Specimens: Right leg    Brief history: Patient is a 79-year-old female with past medical history as noted above who had previous evidence of osteomyelitis and nonsalvageable right foot resulting in right guillotine below-knee amputation for source control on 11/14/24.  Postoperatively she developed continued lymphedema of her right lower extremity with right posterior leg ulceration and chronic superficial wounds.  Due to the degree of edema her completion amputation was delayed to provide the highest chance of adequate  wound healing without risk of dehiscence and failure secondary to severe edema.  Patient presents today for completion amputation of right lower extremity.  Continues to have severe skin ulceration along posterior right leg in area extending from knee to distal stump.  She does describe unbearable and extreme pain with dressing changes.  She is not currently using a prosthetic on her left lower extremity and is nonambulatory.  She does have evidence of contracture in her right lower extremity.    We did discuss the option for attempted below-knee amputation although this would likely result in continued wound care as these areas of ulceration would not be fully debrided due to need for posterior flap.  We also discussed option for above-knee amputation and removal of skin wounds for therapeutic and functional benefit.  Patient and her daughter do wish to proceed with above-knee amputation if she does feel she can no longer tolerate wound care to her right lower extremity wounds and does not plan to attempt ambulation in the future and would rather just have improved quality of life from decreased pain and improved overall comfort.  Risk, benefits and alternatives of above-knee amputation including but not limited to death, anesthetic or cardiopulmonary complications, bleeding, infection, lymphatic leak, vascular injury resulting in higher level limb loss, acute renal insufficiency due to contrast exposure requiring temporary or permanent dialysis, failure of the amputation to heal, phantom pain, and mobility difficulties after the operation.  We discussed operative conduct including team approach, potential need for blood transfusion, and possibility of performing medical photography.  The patient and her daughter understand the risks and would like to proceed with right above-knee amputation    .   Operative details:     The procedure was performed under general anesthesia.  The patient was placed supine.  The  right lower extremity was prepped and draped in usual sterile fashion.  An occlusive dressing was applied to the top of the residual limp.  A tourniquet was applied to the thigh as well, and the cuff pressure was raised to 250 mmHg after exsanguination of the right lower extremity with Esmarch wrap.  Anterior and posterior skin incisions were outlined with a marking pen ensuring incision was 15 to 25 cm distal to right greater trochanter as well as 10 cm above tibial plateau.  Timeout was performed.  The anterior skin incision was made just above the knee joint and extended medially and laterally towards the edges of the in an area just posterior to femur.  The skin incision was then extended distally on either side parallel to the femur for approximately 15 cm creating a posterior flap.  The skin and subcutaneous tissue were incised using electrocautery, down to the fascia.  The greater saphenous vein was ligated using 3-0 silk.  The fascia and the muscles were then divided with the electrocautery at the level of the anterior skin incision.  The muscles in the thigh were divided, exposing the femur. The medial muscles were divided including the sartorius to reveal with superficial artery and vein which were ligated with multiple silk 2-0 and 3-0 stitches placed in suture ligature fashion. Of note there was significant volume of soft tissue and adipose tissue which required extensive dissection. Muscle did appear to have poor tissue quality although viable with punctate bleeding noted throughout all muscle groups.   The periosteum along the femur was incised circumferentially with electrocautery at the same level of the skin and muscle division.  Using a periosteal elevator, the periosteum of the femur was stripped proximally for about 2 cm.  The femur was then transected with electric saw approximate 2 cm proximally to the skin incision.   The amputation was then completed with the amputation knife, transecting the  posterior muscles at the same level as the posterior flap.  Hemostasis was achieved using several stick ties 3-0 silk, 2-0 silk, electrocautery, and multiple 3-0 silk free ties. The sciatic nerve was identified and dissected then transected sharply at proximal portion allowing it to retract away from skin edges and into wound.  Sharp bony edges were then filed, eliminating any bone prominences over the anterior aspect of the femur and bone wax was placed into the bone marrow due to continued oozing. Hemostasis was then secured with electorcautery and addiontal 3-0 silk stick ties. Wound was irrigated with pulse  with total of 2L normal saline. Hemostasis was again then confirmed. Fascia was reaproximated using 0 vicryl sutures in interrupted fashion. Soft tissue and superficial fascia was reaproximated with 2-0 and 3-0 vicryl. The wound was closed in layers due to sigifnicant soft tissue >8cm from skin edge to fascia. A 19f round drain was then left in wound just deep to fascia due to assist with fluid removal in remaining large cavity in right above knee amputation site.  The skin was approximated using skin staples.  Dressing was applied using xeroform, 4 x 4 gauze, Kerlix, and Ace bandage.    Patient tolerated procedure well.  Patient was extubated and transferred to postoperative care unit in stable condition.    [facility]  Vascular and Endovascular surgery

## 2025-02-11 NOTE — ANESTHESIA PROCEDURE NOTES
Sciatic Procedure Note    Pre-Procedure   Staff -        Anesthesiologist:  Ish Botello MD       Performed By: anesthesiologist       Location: pre-op       Procedure Start/Stop Times: 2/11/2025 9:49 AM and 2/11/2025 9:54 AM       Pre-Anesthestic Checklist: patient identified, IV checked, site marked, risks and benefits discussed, informed consent, monitors and equipment checked, pre-op evaluation, at physician/surgeon's request and post-op pain management  Timeout:       Correct Patient: Yes        Correct Procedure: Yes        Correct Site: Yes        Correct Position: Yes        Correct Laterality: Yes        Site Marked: Yes  Procedure Documentation  Procedure: Sciatic         Laterality: right       Patient Position: supine (leg in bolster)       Patient Prep/Sterile Barriers: sterile gloves, mask       Skin prep: Chloraprep (gluteal (subgluteal) approach).       Needle Type: short bevel       Needle Gauge: 20.        Needle Length (Inches): 4        Ultrasound guided       1. Ultrasound was used to identify targeted nerve, plexus, vascular marker, or fascial plane and place a needle adjacent to it in real-time.       2. Ultrasound was used to visualize the spread of anesthetic in close proximity to the above referenced structure.       3. A permanent image is entered into the patient's record.       4. The visualized anatomic structures appeared normal.       5. There were no apparent abnormal pathologic findings.    Assessment/Narrative         The placement was negative for: blood aspirated, painful injection and site bleeding       Paresthesias: No.       Bolus given via needle..        Secured via.        Insertion/Infusion Method: Single Shot       Complications: none       Injection made incrementally with aspirations every 5 mL.    Medication(s) Administered   Bupivacaine 0.5% PF (Infiltration) - Infiltration   18 mL - 2/11/2025 9:54:00 AM  Medication Administration Time: 2/11/2025 9:49  "AM      FOR [facility]) ONLY:   Pain Team Contact information: please page the Pain Team Via [employer]. Search \"Pain\". During daytime hours, please page the attending first. At night please page the resident first.      "

## 2025-02-12 ENCOUNTER — APPOINTMENT (OUTPATIENT)
Dept: PHYSICAL THERAPY | Facility: CLINIC | Age: 80
DRG: 240 | End: 2025-02-12
Attending: STUDENT IN AN ORGANIZED HEALTH CARE EDUCATION/TRAINING PROGRAM
Payer: COMMERCIAL

## 2025-02-12 LAB
ANION GAP SERPL CALCULATED.3IONS-SCNC: 11 MMOL/L (ref 7–15)
ATRIAL RATE - MUSE: NORMAL BPM
BUN SERPL-MCNC: 26.2 MG/DL (ref 8–23)
CALCIUM SERPL-MCNC: 10.2 MG/DL (ref 8.8–10.4)
CHLORIDE SERPL-SCNC: 96 MMOL/L (ref 98–107)
CREAT SERPL-MCNC: 0.77 MG/DL (ref 0.51–0.95)
DIASTOLIC BLOOD PRESSURE - MUSE: NORMAL MMHG
EGFRCR SERPLBLD CKD-EPI 2021: 78 ML/MIN/1.73M2
ERYTHROCYTE [DISTWIDTH] IN BLOOD BY AUTOMATED COUNT: 17 % (ref 10–15)
GLUCOSE BLDC GLUCOMTR-MCNC: 235 MG/DL (ref 70–99)
GLUCOSE BLDC GLUCOMTR-MCNC: 238 MG/DL (ref 70–99)
GLUCOSE BLDC GLUCOMTR-MCNC: 242 MG/DL (ref 70–99)
GLUCOSE BLDC GLUCOMTR-MCNC: 276 MG/DL (ref 70–99)
GLUCOSE BLDC GLUCOMTR-MCNC: 280 MG/DL (ref 70–99)
GLUCOSE SERPL-MCNC: 315 MG/DL (ref 70–99)
HCO3 SERPL-SCNC: 31 MMOL/L (ref 22–29)
HCT VFR BLD AUTO: 32.4 % (ref 35–47)
HGB BLD-MCNC: 10.2 G/DL (ref 11.7–15.7)
INTERPRETATION ECG - MUSE: NORMAL
MAGNESIUM SERPL-MCNC: 2 MG/DL (ref 1.7–2.3)
MCH RBC QN AUTO: 28.9 PG (ref 26.5–33)
MCHC RBC AUTO-ENTMCNC: 31.5 G/DL (ref 31.5–36.5)
MCV RBC AUTO: 92 FL (ref 78–100)
P AXIS - MUSE: NORMAL DEGREES
PHOSPHATE SERPL-MCNC: 2.9 MG/DL (ref 2.5–4.5)
PLATELET # BLD AUTO: 156 10E3/UL (ref 150–450)
POTASSIUM SERPL-SCNC: 4.3 MMOL/L (ref 3.4–5.3)
PR INTERVAL - MUSE: NORMAL MS
QRS DURATION - MUSE: 84 MS
QT - MUSE: 396 MS
QTC - MUSE: 421 MS
R AXIS - MUSE: 117 DEGREES
RBC # BLD AUTO: 3.53 10E6/UL (ref 3.8–5.2)
SODIUM SERPL-SCNC: 138 MMOL/L (ref 135–145)
SYSTOLIC BLOOD PRESSURE - MUSE: NORMAL MMHG
T AXIS - MUSE: 102 DEGREES
VENTRICULAR RATE- MUSE: 68 BPM
WBC # BLD AUTO: 10.7 10E3/UL (ref 4–11)

## 2025-02-12 PROCEDURE — 250N000013 HC RX MED GY IP 250 OP 250 PS 637: Performed by: STUDENT IN AN ORGANIZED HEALTH CARE EDUCATION/TRAINING PROGRAM

## 2025-02-12 PROCEDURE — 93005 ELECTROCARDIOGRAM TRACING: CPT

## 2025-02-12 PROCEDURE — 97162 PT EVAL MOD COMPLEX 30 MIN: CPT | Mod: GP

## 2025-02-12 PROCEDURE — 83735 ASSAY OF MAGNESIUM: CPT | Performed by: STUDENT IN AN ORGANIZED HEALTH CARE EDUCATION/TRAINING PROGRAM

## 2025-02-12 PROCEDURE — 93010 ELECTROCARDIOGRAM REPORT: CPT | Performed by: INTERNAL MEDICINE

## 2025-02-12 PROCEDURE — 80048 BASIC METABOLIC PNL TOTAL CA: CPT | Performed by: STUDENT IN AN ORGANIZED HEALTH CARE EDUCATION/TRAINING PROGRAM

## 2025-02-12 PROCEDURE — G0463 HOSPITAL OUTPT CLINIC VISIT: HCPCS

## 2025-02-12 PROCEDURE — 97530 THERAPEUTIC ACTIVITIES: CPT | Mod: GP

## 2025-02-12 PROCEDURE — 120N000001 HC R&B MED SURG/OB

## 2025-02-12 PROCEDURE — 99223 1ST HOSP IP/OBS HIGH 75: CPT | Performed by: PAIN MEDICINE

## 2025-02-12 PROCEDURE — 85014 HEMATOCRIT: CPT | Performed by: STUDENT IN AN ORGANIZED HEALTH CARE EDUCATION/TRAINING PROGRAM

## 2025-02-12 PROCEDURE — 250N000013 HC RX MED GY IP 250 OP 250 PS 637: Performed by: PAIN MEDICINE

## 2025-02-12 PROCEDURE — 250N000011 HC RX IP 250 OP 636: Mod: JZ | Performed by: STUDENT IN AN ORGANIZED HEALTH CARE EDUCATION/TRAINING PROGRAM

## 2025-02-12 PROCEDURE — 36415 COLL VENOUS BLD VENIPUNCTURE: CPT | Performed by: STUDENT IN AN ORGANIZED HEALTH CARE EDUCATION/TRAINING PROGRAM

## 2025-02-12 PROCEDURE — 84100 ASSAY OF PHOSPHORUS: CPT | Performed by: STUDENT IN AN ORGANIZED HEALTH CARE EDUCATION/TRAINING PROGRAM

## 2025-02-12 PROCEDURE — 97542 WHEELCHAIR MNGMENT TRAINING: CPT | Mod: GP

## 2025-02-12 PROCEDURE — 99233 SBSQ HOSP IP/OBS HIGH 50: CPT | Performed by: STUDENT IN AN ORGANIZED HEALTH CARE EDUCATION/TRAINING PROGRAM

## 2025-02-12 RX ORDER — NALOXONE HYDROCHLORIDE 0.4 MG/ML
0.2 INJECTION, SOLUTION INTRAMUSCULAR; INTRAVENOUS; SUBCUTANEOUS
Status: DISCONTINUED | OUTPATIENT
Start: 2025-02-12 | End: 2025-02-17

## 2025-02-12 RX ORDER — NALOXONE HYDROCHLORIDE 0.4 MG/ML
0.4 INJECTION, SOLUTION INTRAMUSCULAR; INTRAVENOUS; SUBCUTANEOUS
Status: DISCONTINUED | OUTPATIENT
Start: 2025-02-12 | End: 2025-02-17

## 2025-02-12 RX ORDER — DEXTROSE MONOHYDRATE 25 G/50ML
25-50 INJECTION, SOLUTION INTRAVENOUS
Status: DISCONTINUED | OUTPATIENT
Start: 2025-02-12 | End: 2025-02-17 | Stop reason: HOSPADM

## 2025-02-12 RX ORDER — HYDROMORPHONE HYDROCHLORIDE 2 MG/1
2 TABLET ORAL
Status: DISCONTINUED | OUTPATIENT
Start: 2025-02-12 | End: 2025-02-17 | Stop reason: HOSPADM

## 2025-02-12 RX ORDER — HYDROMORPHONE HCL IN WATER/PF 6 MG/30 ML
0.1 PATIENT CONTROLLED ANALGESIA SYRINGE INTRAVENOUS EVERY 6 HOURS PRN
Status: DISCONTINUED | OUTPATIENT
Start: 2025-02-12 | End: 2025-02-14

## 2025-02-12 RX ORDER — NICOTINE POLACRILEX 4 MG
15-30 LOZENGE BUCCAL
Status: DISCONTINUED | OUTPATIENT
Start: 2025-02-12 | End: 2025-02-17 | Stop reason: HOSPADM

## 2025-02-12 RX ADMIN — GABAPENTIN 300 MG: 300 CAPSULE ORAL at 20:58

## 2025-02-12 RX ADMIN — MICONAZOLE NITRATE: 2 POWDER TOPICAL at 09:08

## 2025-02-12 RX ADMIN — METHADONE HYDROCHLORIDE 5 MG: 5 TABLET ORAL at 15:47

## 2025-02-12 RX ADMIN — INSULIN DEGLUDEC 30 UNITS: 100 INJECTION, SOLUTION SUBCUTANEOUS at 10:49

## 2025-02-12 RX ADMIN — HYDROMORPHONE HYDROCHLORIDE 2 MG: 2 TABLET ORAL at 15:47

## 2025-02-12 RX ADMIN — TORSEMIDE 60 MG: 20 TABLET ORAL at 09:07

## 2025-02-12 RX ADMIN — MICONAZOLE NITRATE: 20 CREAM TOPICAL at 09:08

## 2025-02-12 RX ADMIN — CEFAZOLIN SODIUM 2 G: 2 INJECTION, SOLUTION INTRAVENOUS at 02:30

## 2025-02-12 RX ADMIN — METHADONE HYDROCHLORIDE 5 MG: 5 TABLET ORAL at 09:07

## 2025-02-12 RX ADMIN — HYDROMORPHONE HYDROCHLORIDE 2 MG: 2 TABLET ORAL at 11:00

## 2025-02-12 RX ADMIN — METHADONE HYDROCHLORIDE 5 MG: 5 TABLET ORAL at 12:40

## 2025-02-12 RX ADMIN — ACETAMINOPHEN 975 MG: 325 TABLET ORAL at 22:35

## 2025-02-12 RX ADMIN — ACETAMINOPHEN 975 MG: 325 TABLET ORAL at 06:34

## 2025-02-12 RX ADMIN — ZINC SULFATE 220 MG (50 MG) CAPSULE 220 MG: CAPSULE at 09:08

## 2025-02-12 RX ADMIN — METHADONE HYDROCHLORIDE 5 MG: 5 TABLET ORAL at 23:27

## 2025-02-12 RX ADMIN — ACETAMINOPHEN 975 MG: 325 TABLET ORAL at 14:14

## 2025-02-12 RX ADMIN — ATORVASTATIN CALCIUM 40 MG: 40 TABLET, FILM COATED ORAL at 20:58

## 2025-02-12 RX ADMIN — MICONAZOLE NITRATE: 20 CREAM TOPICAL at 20:46

## 2025-02-12 RX ADMIN — ENOXAPARIN SODIUM 40 MG: 40 INJECTION SUBCUTANEOUS at 09:07

## 2025-02-12 RX ADMIN — GABAPENTIN 300 MG: 300 CAPSULE ORAL at 14:13

## 2025-02-12 RX ADMIN — GABAPENTIN 300 MG: 300 CAPSULE ORAL at 09:07

## 2025-02-12 ASSESSMENT — ACTIVITIES OF DAILY LIVING (ADL)
ADLS_ACUITY_SCORE: 60
DEPENDENT_IADLS:: CLEANING;COOKING;LAUNDRY;SHOPPING;MEAL PREPARATION;MEDICATION MANAGEMENT;MONEY MANAGEMENT;TRANSPORTATION
ADLS_ACUITY_SCORE: 60
ADLS_ACUITY_SCORE: 62
ADLS_ACUITY_SCORE: 62
ADLS_ACUITY_SCORE: 60
ADLS_ACUITY_SCORE: 62
ADLS_ACUITY_SCORE: 60
ADLS_ACUITY_SCORE: 60
ADLS_ACUITY_SCORE: 66
ADLS_ACUITY_SCORE: 62
ADLS_ACUITY_SCORE: 60
ADLS_ACUITY_SCORE: 60
ADLS_ACUITY_SCORE: 62
ADLS_ACUITY_SCORE: 60
ADLS_ACUITY_SCORE: 60
ADLS_ACUITY_SCORE: 66

## 2025-02-12 NOTE — CONSULTS
[facility]  WO Nurse Inpatient Assessment     Consulted for: sacrum, buttocks, back of thigh, sreekanth area    Summary: patient is well known to the WO team    WO nurse follow-up plan: weekly    Patient History (according to provider note(s):      Linda Loredo is a 79 year old female admitted on 2/11/2025. She has a past medical history significant for type 2 diabetes, CKD stage III, chronic pain syndrome, paroxysmal A-fib, chronic right-sided heart failure, peripheral arterial disease, and [place] who had a recent guillotine below-knee amputation complicated by poor wound healing admitted for right above-knee amputation.  Medicine was consulted for medical comanagement. Evaluated patient at bedside, introduced myself, explained the roll of hospital medicine, medically co-managing along side the primary team. Explained that the primary team is usually managing everything regarding the surgery, including any pain medications, anti nausea, as well as therapies and anything else related to the procedure. Explained that we as hospitalists manage any medical conditions that already exist or may arise during your stay. Explained any and all questions to the best of my ability. Post operatively she is doing well aside from some pain which nursing is working on, family is also bedside and both them and patient denies any other questions or concerns.      Assessment:      Skin Injury Location: buttocks, perineum    2/12    Last photo: 2/12  Skin injury due to: Chronic skin discoloration and Irritant contact dermatitis due to fecal, urinary or dual incontinence   Skin history and plan of care:   chronic incontinence, bilateral BKAs, mobility and moisture issues  Affected area:      Skin assessment: Erythema and purple hue from chronic incontinence; all redness is blanchable at this time     Measurements (length x width x depth, in cm) no wound noted, some flaking skin      Color: dusky      "Temperature  warm     Drainage: none .      Color: none      Odor: none  Pain: mild, tender  Pain interventions prior to dressing change: slow and gentle cares   Treatment goal: Protection  STATUS: initial assessment  Supplies ordered: supplies stored on unit       Treatment Plan:     Sacrum wound: Every 3 days   Cleanse the area with NS and pat dry.  Apply No sting film barrier to periwound skin.  Cover wound with Sacral Mepilex (#123992)  Change dressing Q 3 days.  Turn and reposition Q 2hrs side to side only.  Ensure pt has Shaan-cushion while sitting up in the chair.  FYI- If pt has constant incontinent loose stools needing dressing changes Q shift please discontinue the Mepilex dressing and apply criticaid barrier paste BID and PRN.    Pressure Injury Prevention (PIP) Plan:  If patient is declining pressure injury prevention interventions: Explore reason why and address patient's concerns, Educate on pressure injury risk and prevention intervention(s), If patient is still declining, document \"informed refusal\" , and Ensure Care team is aware ( provider, charge nurse, etc)  Mattress: Follow bed algorithm, add Low Air Loss (Air+) mattress pump if skin is very moist or constantly moist.   HOB: Maintain at or below 30 degrees, unless contraindicated  Repositioning in bed: Every 1-2 hours , Left/right positioning; avoid supine, and Raise foot of bed prior to raising head of bed, to reduce patient sliding down (shear)  Heels:  Bilateral BKAs  Protective Dressing: Sacral Mepilex for prevention (#545430),  especially for the agitated patient   Positioning Equipment:Positioning wedges (#288331) to help maintain 30 degree side lying position   Chair positioning: Chair cushion (#307691)  and Assist patient to reposition hourly   If patient has a buttock pressure injury, or high risk for PI use chair cushion or SPS.  Moisture Management: Perineal cleansing /protection: Follow Incontinence Protocol, Avoid brief in bed, Clean " and dry skin folds with bathing , Consider InterDry (#470257) between folds, and Moisturize dry skin  Under Devices: Inspect skin under all medical devices during skin inspection , Ensure tubes are stabilized without tension, and Ensure patient is not lying on medical devices or equipment when repositioned  Ask provider to discontinue device when no longer needed.          Orders: Written    RECOMMEND PRIMARY TEAM ORDER: None, at this time  Education provided: plan of care  Discussed plan of care with: Patient  Notify WOC if wound(s) deteriorate.  Nursing to notify the Provider(s) and re-consult the WO Nurse if new skin concern.    DATA:     Current support surface: Standard  Standard gel mattress (Isoflex)  Containment of urine/stool: Incontinence Protocol  BMI: Body mass index is 29.25 kg/m .   Active diet order: Orders Placed This Encounter      Moderate Consistent Carb (60 g CHO per Meal) Diet     Output: I/O last 3 completed shifts:  In: 1764.79 [P.O.:120; I.V.:1392.79; IV Piggyback:2]  Out: 215 [Drains:115; Blood:100]     Labs:   Recent Labs   Lab 02/12/25  0643 02/11/25  0755   HGB 10.2* 10.6*   WBC 10.7 8.2   A1C  --  7.2*     Pressure injury risk assessment:   Sensory Perception: 4-->no impairment  Moisture: 3-->occasionally moist  Activity: 2-->chairfast  Mobility: 2-->very limited  Nutrition: 3-->adequate  Friction and Shear: 1-->problem  Suresh Score: 15    ZAMZAM Lindsay RN CWOCN  Pager no longer in use, please contact through [employer] group: [facility]

## 2025-02-12 NOTE — PLAN OF CARE
Goal Outcome Evaluation:      Plan of Care Reviewed With: patient, child          Outcome Evaluation: Anticipate DC back to [facility]

## 2025-02-12 NOTE — CONSULTS
Care Management Initial Consult    General Information  Assessment completed with: Patient,    Type of CM/SW Visit: Initial Assessment    Primary Care Provider verified and updated as needed: Yes   Reason for Consult: discharge planning       Communication Assessment  Patient's communication style: spoken language (English or Bilingual)    Hearing Difficulty or Deaf: no   Wear Glasses or Blind: no    Cognitive  Cognitive/Neuro/Behavioral: WDL  Level of Consciousness: alert  Arousal Level: opens eyes spontaneously  Orientation: oriented x 4  Mood/Behavior: calm, cooperative     Speech: clear, spontaneous, logical    Living Environment:   People in home: facility resident     Current living Arrangements: other (see comments) (LT)  Name of Facility: [facility]   Able to return to prior arrangements: yes       Family/Social Support:  Care provided by: other (see comments) (staff at LT)  Provides care for: no one  Marital Status:   Support system: Children          Description of Support System: Supportive, Involved         Current Resources:   Patient receiving home care services: No        Community Resources: None  Equipment currently used at home: wheelchair, manual, commode chair, grab bar, toilet, grab bar, tub/shower, shower chair  Supplies currently used at home: Diabetic Supplies    Employment/Financial:  Employment Status: retired        Financial Concerns: none   Referral to Financial Worker: No       Does the patient's insurance plan have a 3 day qualifying hospital stay waiver?  No    Lifestyle & Psychosocial Needs:  Social Drivers of Health     Food Insecurity: Low Risk  (11/13/2024)    Food Insecurity     Within the past 12 months, did you worry that your food would run out before you got money to buy more?: No     Within the past 12 months, did the food you bought just not last and you didn t have money to get more?: No   Depression: Not at risk (9/12/2023)    Received from [facility], [facility]    PHQ-2     PHQ-2 TOTAL SCORE: 1   Housing Stability: Low Risk  (11/13/2024)    Housing Stability     Do you have housing? : Yes     Are you worried about losing your housing?: No   Tobacco Use: Low Risk  (2/11/2025)    Patient History     Smoking Tobacco Use: Never     Smokeless Tobacco Use: Never     Passive Exposure: Not on file   Financial Resource Strain: Low Risk  (11/13/2024)    Financial Resource Strain     Within the past 12 months, have you or your family members you live with been unable to get utilities (heat, electricity) when it was really needed?: No   Alcohol Use: Not on file   Transportation Needs: Low Risk  (11/13/2024)    Transportation Needs     Within the past 12 months, has lack of transportation kept you from medical appointments, getting your medicines, non-medical meetings or appointments, work, or from getting things that you need?: No   Physical Activity: Not on file   Interpersonal Safety: Low Risk  (2/11/2025)    Interpersonal Safety     Do you feel physically and emotionally safe where you currently live?: Yes     Within the past 12 months, have you been hit, slapped, kicked or otherwise physically hurt by someone?: No     Within the past 12 months, have you been humiliated or emotionally abused in other ways by your partner or ex-partner?: No   Stress: Not on file   Social Connections: Unknown (3/9/2023)    Received from [facility], [facility]    Social Connections     Frequency of Communication with Friends and Family: Not on file   Health Literacy: Not on file       Functional Status:  Prior to admission patient needed assistance:   Dependent ADLs:: Bathing, Dressing, Grooming, Wheelchair-with assist  Dependent IADLs:: Cleaning, Cooking, Laundry, Shopping, Meal Preparation, Medication Management, Money Management, Transportation    "    Mental Health Status:  Mental Health Status: No Current Concerns       Chemical Dependency Status:  Chemical Dependency Status: No Current Concerns             Discussed  Partnership in Safe Discharge Planning  document with patient/family: No    Additional Information:  1:13 PM  Assessment: Follow   Last Note:02/12/25  Plan: Assessed.  From [facility] (has bed hold).  Uses a WC (in pt's room).  Open to Elderly Waiver.  SHERYL Chappell.    Needs: Will need discharge order to state \"PT/OT to eval and treat\"  Hand off sent: No  Transport: Medical:  Wheelchair (pt has WC in room)    Spoke with pt and daughter around discharge plans.  They would like Pt to move closer to [place].  SW directed them to speak with  at LTC and/or Elderly Waiver CM to assist with transferring to a different LTC.  They acknowledged information.        JAVIER Funes      "

## 2025-02-12 NOTE — PROGRESS NOTES
[facility]    Medicine Progress Note - Hospitalist Service    Date of Admission:  2/11/2025    Assessment & Plan   Linda Loredo is a 79 year old female admitted on 2/11/2025. She has a past medical history significant for type 2 diabetes, CKD stage III, chronic pain syndrome, paroxysmal A-fib, chronic right-sided heart failure, peripheral arterial disease, and BERLIN who had a recent guillotine below-knee amputation complicated by poor wound healing admitted for right above-knee amputation.  Medicine was consulted for medical comanagement.    Changes today:  -Resumed home Tresiba 30 units every morning, increase sliding scale insulin to high-dose sliding scale insulin  -Hemoglobin stable, pain under reasonable control continue all other current management  -Discharge plans per vascular    PAD  S/P R. AKA  -Analgesics, antiemetics, DVT prophylaxis, and therapies per surgical team   -PTA statin    History of A-fib  -PTA apixaban once safe to do so from surgical standpoint  -Is not on any rate controlling therapy or antiarrhythmics  -Monitor for any signs of tachycardia    Type 2 diabetes  -Resume home Tresiba now on high-dose sliding scale insulin    HFpEF  -PTA torsemide holding parameters    Chronic pain  -Agree with pain team consult, pain otherwise per primary team    CKD stage III  -Avoid nephrotoxins trend    Hx of BERLIN  -Does not use a CPAP but she cannot tolerate it, consider pulse ox if patient heavily sedated at risk for postop hypoventilation          Diet: Moderate Consistent Carb (60 g CHO per Meal) Diet    DVT Prophylaxis: Defer to primary service  Braga Catheter: Not present  Lines: None     Cardiac Monitoring: None  Code Status: Full Code      Clinically Significant Risk Factors        # Hyperkalemia: Highest K = 5.9 mmol/L in last 2 days, will monitor as appropriate   # Hypochloremia: Lowest Cl = 95 mmol/L in last 2 days, will monitor as appropriate          #  Hypertension: Noted on problem list           # DMII: A1C = 7.2 % (Ref range: <5.7 %) within past 6 months       # Financial/Environmental Concerns: none         Social Drivers of Health     [facility]    Social Connections          Disposition Plan     Medically Ready for Discharge: Anticipated in 2-4 Days             Tre Perez MD  Hospitalist Service  [facility]  Securely message with [employer] (more info)  Text page via [employer] Paging/Directory   ______________________________________________________________________    Interval History   No acute events overnight, today reports pain is well-controlled denied any questions or concerns wanted to be covered with a blanket this was provided.    Physical Exam   Vital Signs: Temp: 97.9  F (36.6  C) Temp src: Oral BP: 116/56 Pulse: 73   Resp: 14 SpO2: 98 % O2 Device: None (Room air) Oxygen Delivery: 1 LPM  Weight: 181 lbs 3.49 oz    Gen: Appears well, NAD, on RA  Card: Warm well perfused, pulses assumed patient talking  Pulm: Normal I/E effort on RA  Abd:Non distended  Skin:No obvious rashes or lesions on exposed areas of skin  Neuro AxOx4, S/S grossly intact, no obvious FND,   Psych:Pleasant, answering questions appropriately, insight good, judgement good, does not appear to be responding to I/E stimuli     Medical Decision Making       60 MINUTES SPENT BY ME on the date of service doing chart review, history, exam, documentation & further activities per the note.      Data   ------------------------- PAST 24 HR DATA REVIEWED -----------------------------------------------    I have personally reviewed the following data over the past 24 hrs:    10.7  \   10.2 (L)   / 156     138 96 (L) 26.2 (H) /  242 (H)   4.3 31 (H) 0.77 \       Imaging results reviewed over the past 24 hrs:   No results found for this or any previous visit (from the past 24  hours).

## 2025-02-12 NOTE — PLAN OF CARE
Goal Outcome Evaluation:      Plan of Care Reviewed With: patient    Overall Patient Progress: improvingOverall Patient Progress: improving    Outcome Evaluation: Pt alert and oriented x 4. Up to her home wheelchair today with two assist. Able to slide from bed to chair with assistance. Has been up most of the day. Dressing intact. But LEILA drain no longer compressing. Vascular PA notified and she said to leave the LEILA drain in place overnight, she does not want the dressing on the stump to be removed until 2/13. Pain managed with PRN dilaudid and tylenol. Blood sugars in the 200's this shift. Tearful on and off, requested a someone from [facility] care to see her. I put in the order. Discharge plan pending.      Problem: Pain Acute  Goal: Optimal Pain Control and Function  Outcome: Progressing  Intervention: Develop Pain Management Plan  Recent Flowsheet Documentation  Taken 2/12/2025 1413 by Louie Doherty RN  Pain Management Interventions: medication (see MAR)  Taken 2/12/2025 1100 by Louie Doherty RN  Pain Management Interventions: medication (see MAR)  Taken 2/12/2025 0907 by Louie Doherty RN  Pain Management Interventions: medication offered but refused  Intervention: Prevent or Manage Pain  Recent Flowsheet Documentation  Taken 2/12/2025 0932 by Louie Doherty RN  Medication Review/Management: medications reviewed     Problem: Comorbidity Management  Goal: Blood Glucose Levels Within Targeted Range  Outcome: Progressing  Intervention: Monitor and Manage Glycemia  Recent Flowsheet Documentation  Taken 2/12/2025 0932 by Louie Doherty RN  Medication Review/Management: medications reviewed     Problem: Lower Extremity Amputation  Goal: Optimal Adjustment to Amputation  Outcome: Progressing     Louie Doherty RN, ONC

## 2025-02-12 NOTE — PLAN OF CARE
"Problem: Adult Inpatient Plan of Care  Goal: Absence of Hospital-Acquired Illness or Injury  Intervention: Identify and Manage Fall Risk  Problem: Pain Acute  Goal: Optimal Pain Control and Function  Intervention: Prevent or Manage Pain   Problem: Comorbidity Management  Goal: Maintenance of Asthma Control  Intervention: Maintain Asthma Symptom Control    Goal Outcome Evaluation:  Patient vital signs are at baseline: Yes  Patient able to ambulate as they were prior to admission or with assist devices provided by therapies during their stay:  No,  Reason:  Wheelchair bound  Patient MUST void prior to discharge:  Yes. Utilized purewick  Patient able to tolerate oral intake:  Yes  Pain has adequate pain control using Oral analgesics:  Yes  Does patient have an identified :  Yes  Has goal D/C date and time been discussed with patient:  Yes   Patient is alert and oriented X 4. Patient was teary and stated that \"I can't put on my shoes anymore\". Emotional support provided. Denied pain but was given scheduled Tylenol. IV saline locked. Oxygen at 1L via NC. VSS. Bed alarms activated for safety.                          "

## 2025-02-12 NOTE — PROGRESS NOTES
02/12/25 1000   Appointment Info   Signing Clinician's Name / Credentials (PT) Yael Lakhani, PT, DPT   Living Environment   People in Home facility resident   Current Living Arrangements other (see comments)  (long term care)   Home Accessibility no concerns   Self-Care   Usual Activity Tolerance fair   Current Activity Tolerance fair   Equipment Currently Used at Home wheelchair, manual   Fall history within last six months no   General Information   Onset of Illness/Injury or Date of Surgery 02/11/25   Referring Physician Clover Dorman,    Pertinent History of Current Problem (include personal factors and/or comorbidities that impact the POC) s/p AKA R LE, h/o L BKA, R BKA 11/2024   Existing Precautions/Restrictions no known precautions/restrictions   Weight-Bearing Status - LLE   (BKA)   Weight-Bearing Status - RLE nonweight-bearing  (AKA)   Range of Motion (ROM)   ROM Comment WFL   Strength (Manual Muscle Testing)   Strength Comments generalized weakness   Bed Mobility   Bed Mobility supine-sit   Supine-Sit Harmon (Bed Mobility) supervision;verbal cues  (to long sitting)   Transfers   Transfers wheelchair transfer   Maintains Weight-bearing Status (Transfers) able to maintain   Wheelchair Transfer   Harmon Level (Wheelchair Transfer) supervision;verbal cues   Type (Wheelchair Transfer) lateral   Assistive Device (Wheelchair Transfer) wheelchair   Clinical Impression   Criteria for Skilled Therapeutic Intervention Yes, treatment indicated   PT Diagnosis (PT) impaired functional mobility   Influenced by the following impairments weakness, pain, impaired balance   Functional limitations due to impairments gait, stairs, transfers   Clinical Presentation (PT Evaluation Complexity) stable   Clinical Presentation Rationale pt presents as medically diagnosed   Clinical Decision Making (Complexity) moderate complexity   Planned Therapy Interventions (PT) balance training;bed mobility training;gait  training;home exercise program;patient/family education;ROM (range of motion);stair training;strengthening;transfer training   Risk & Benefits of therapy have been explained care plan/treatment goals reviewed;patient   PT Total Evaluation Time   PT Eval, Moderate Complexity Minutes (64441) 10   Physical Therapy Goals   PT Frequency One time eval and treatment only   PT Predicted Duration/Target Date for Goal Attainment 02/12/25   PT Goals Transfers;Wheelchair Mobility   PT: Transfers Bed to/from chair;Supervision/stand-by assist;Assistive device;Goal Met  (manual w/c)   PT: Wheelchair Mobility 50 feet;Caregiver SBA;manual wheelchair;Goal Met   Interventions   Interventions Quick Adds Therapeutic Activity;Wheelchair Mgmt/Training   Therapeutic Activity   Therapeutic Activities: dynamic activities to improve functional performance Minutes (48005) 25   Treatment Detail/Skilled Intervention supine to long sitting with HOB slightly elevated x3, tolerates well, mod I, pt states she has an adjustable bed at home, w/c setup next to bed, educated on NWB R LE, cueing for technique, pt uses arms and laterally scoots in bed and into chair, increased time for setup with towels to pad prominent pieces on w/c, increased effort but able to maintain R LE in air, posterior seated scoot in w/c mod I   Wheelchair Managment/Training   Wheelchair Mgmt/Training Minutes (31952) 20   Treatment Detail/Skilled Intervention pt propels with bilat UEs, able to navigate obstacles, door thresholds and reverse as needed, pt demonstrates safety with maneuvering, has comfort with positioning in chair, SBA/Mod I, increased time due to slow pace, 120' on flat ground except door threshold x2   PT Discharge Planning   PT Plan d/c PT   PT Discharge Recommendation (DC Rec) Long term care facility;home with home care physical therapy   PT Rationale for DC Rec home to LTC facility with 24 hour assist with transfers, Home PT to continue strengthening and  "efficiency of transfers   PT Brief overview of current status SBA with lateral transfer from bed to w/c, pt states has help with transfers at home to \"hold the chair steady\", mod I with maneuvering w/c with bilat UEs   PT Total Distance Amb During Session (feet) 120  (w/c propelling with bilat UEs)   PT Equipment Needed at Discharge wheelchair   Physical Therapy Time and Intention   Timed Code Treatment Minutes 45   Total Session Time (sum of timed and untimed services) 55       Physical Therapy Discharge Summary    Reason for therapy discharge:    All goals and outcomes met, no further needs identified.    Progress towards therapy goal(s). See goals on Care Plan in [facility] electronic health record for goal details.  Goals met    Therapy recommendation(s):    Continued therapy is recommended.  Rationale/Recommendations:  Home PT.      "

## 2025-02-12 NOTE — CONSULTS
[facility] ACUTE INPATIENT PAIN SERVICE    [facility], [facility], [facility], [facility], [place]   PAIN Consult      Assessment/Plan:  Linda Loredo is a 79 year old female who was admitted on 2/11/2025.  I was asked by Navid Caceres MD  to see the patient for pain post R AKA. Admitted for planned amputation. History of CKD 3, constipation, osteomyelitis, depression, DM 2, fibromyalgia, insomnia, HTN, obesity, COPD, hyperlipidemia, opioid dependence, afib, Sees Viridiana Daly at [facility]. Has not been to in patient appt with Viridiana for a few months due to being in TCU and unable to get to appt. Hopes to get back soon to clinic.   shows: methadone 5mg qid (last 2/8/25--is only for BID but per MAR at TCU/nursing home, was taking QID recently), Dilaudid 2mg #38 for 4 d (last 1/18/25), Lyrica 25mg #60 for 30 (12/10/24). Pain is well controlled, but sleepy today.      PLAN:  Acute pain secondary to surgery, in the setting of chronic pain.   Takes methadone QID baseline, check QtC today.   Multimodal Medication Therapy:   Adjuvants: APAP 975mg q8h, gabapentin 300mg TID: watch for sedation, robaxin 250mg q6hprn  Opioids: methadone QID 5mg  Dilaudid po-  decrease to 2mg PRN  IV dilaudid for BTP pain  Non-medication interventions- Ice  Constipation Prophylaxis- miralax 17g BIDPRN  Follow up /Discharge Recommendations - We recommend prescribing the following at the time of discharge: TBD          Subjective:       Pain is mostly in the right (amputated) shin. No incisional pain. Pinpoint pupils. Poor historian. Worried that shin pain will not go away.     History   Drug Use No         Tobacco Use      Smoking status: Never      Smokeless tobacco: Never        Objective:  Vital signs in last 24 hours:  B/P: 116/56, T: 97.9, P: 73, R: 14   Blood pressure 116/56, pulse 73, temperature 97.9  F (36.6  C), temperature source Oral, resp. rate 14, weight 82.2 kg (181 lb 3.5 oz), SpO2  98%.        Review of Systems:   As per subjective, all others negative.    Physical Exam    General: sleepy  Pupils pinpoint   Amputation noted  LEILA drain in place           Imaging:  Personally Reviewed.    No results found for this visit on 02/11/25.     Lab Results:  Personally Reviewed.   Last Comprehensive Metabolic Panel:  Sodium   Date Value Ref Range Status   02/12/2025 138 135 - 145 mmol/L Final   02/22/2008 140 133 - 144 mmol/L Final     Potassium   Date Value Ref Range Status   02/12/2025 4.3 3.4 - 5.3 mmol/L Final   06/28/2022 4.3 3.5 - 5.0 mmol/L Final   02/22/2008 4.5 3.4 - 5.3 mmol/L Final     Chloride   Date Value Ref Range Status   02/12/2025 96 (L) 98 - 107 mmol/L Final   06/28/2022 97 (L) 98 - 107 mmol/L Final   02/22/2008 100 94 - 109 mmol/L Final     Carbon Dioxide   Date Value Ref Range Status   02/22/2008 28 20 - 32 mmol/L Final     Carbon Dioxide (CO2)   Date Value Ref Range Status   02/12/2025 31 (H) 22 - 29 mmol/L Final   06/28/2022 29 22 - 31 mmol/L Final     Anion Gap   Date Value Ref Range Status   02/12/2025 11 7 - 15 mmol/L Final   06/28/2022 13 5 - 18 mmol/L Final   02/22/2008 11 6 - 17 mmol/L Final     Glucose   Date Value Ref Range Status   02/12/2025 315 (H) 70 - 99 mg/dL Final   06/28/2022 73 70 - 125 mg/dL Final   02/22/2008 298 (H) 60 - 99 mg/dL Final     GLUCOSE BY METER POCT   Date Value Ref Range Status   02/12/2025 276 (H) 70 - 99 mg/dL Final     Urea Nitrogen   Date Value Ref Range Status   02/12/2025 26.2 (H) 8.0 - 23.0 mg/dL Final   06/28/2022 42 (H) 8 - 28 mg/dL Final   02/22/2008 15 7 - 30 mg/dL Final     Creatinine   Date Value Ref Range Status   02/12/2025 0.77 0.51 - 0.95 mg/dL Final   02/22/2008 0.82 0.60 - 1.30 mg/dL Final     GFR Estimate   Date Value Ref Range Status   02/12/2025 78 >60 mL/min/1.73m2 Final     Comment:     eGFR calculated using 2021 CKD-EPI equation.   02/22/2008 75 >60 mL/min/1.7m2 Final     Calcium   Date Value Ref Range Status   02/12/2025  "10.2 8.8 - 10.4 mg/dL Final   02/22/2008 10.4 8.5 - 10.4 mg/dL Final        UA: No results found for: \"UAMP\", \"UBARB\", \"BENZODIAZEUR\", \"UCANN\", \"UCOC\", \"OPIT\", \"UPCP\"           Please see A&P for additional details of medical decision making.  MANAGEMENT DISCUSSED with the following over the past 24 hours: patient and MD   NOTE(S)/MEDICAL RECORDS REVIEWED over the past 24 hours: medicine and vascular   Tests personally interpreted in the past 24 hours:  - ultrasound showing no dvt in [place]   Tests ORDERED & REVIEWED in the past 24 hours:  - UDS  SUPPLEMENTAL HISTORY, in addition to the patient's history, over the past 24 hours obtained from:   - kimberly gill  Medical complexity over the past 24 hours:  -------------------------- HIGH RISK FOR MORBIDITY -------------------------------------------------------------  - Parenteral (IV) CONTROLLED SUBSTANCES ordered           Tasia ROSA, CNS, CNP  Acute Care Pain Management  Team  Hours of pain coverage Mon-Fri 8-1600  After hours contact the primary team  [facility] (RASHID, Lacey, SD, RH)   Page via [employer]vy web console -Click for [employer]            "

## 2025-02-12 NOTE — PROGRESS NOTES
Patient vital signs are at baseline: No,  Reason:  needs o2 when sleeping  Patient able to ambulate as they were prior to admission or with assist devices provided by therapies during their stay:  Yes, wheelchair bound  Patient MUST void prior to discharge:  Yes  Voided  Incontinent, utilizing a purewick  Patient able to tolerate oral intake:  Yes  Pain has adequate pain control using Oral analgesics:  Yes  Does patient have an identified :  Yes  Has goal D/C date and time been discussed with patient:  Yes

## 2025-02-12 NOTE — PROGRESS NOTES
VASCULAR SURGERY PROGRESS NOTE    Subjective:  Patient was seen and evaluated at the bedside for surgical follow up. Pain is controlled. Upset that her phone has not been working to order breakfast this morning. No events overnight or new concerns.    Objective:  Intake/Output Summary (Last 24 hours) at 2/12/2025 0809  Last data filed at 2/12/2025 0638  Gross per 24 hour   Intake 1764.79 ml   Output 215 ml   Net 1549.79 ml     PHYSICAL EXAM:  /60 (BP Location: Left arm)   Pulse 75   Temp 97.9  F (36.6  C) (Oral)   Resp 14   Wt 82.2 kg (181 lb 3.5 oz)   SpO2 98%   BMI 29.25 kg/m    General: The patient is alert and oriented. Appropriate. No acute distress  Psych: Pleasant affect, answers questions appropriately  Skin: Color appropriate for race, warm, dry.  Respiratory: Normal respiratory effort   Extremities: AKA dressing clean, dry, and intact. Minimal sero-sanguinous output in LEILA      Imaging:   Pertinent imaging reviewed    ASSESSMENT:  79-year-old female with past medical history as noted above who had previous evidence of osteomyelitis and nonsalvageable right foot resulting in right guillotine below-knee amputation for source control on 11/14/24.  Postoperatively she developed continued lymphedema of her right lower extremity with right posterior leg ulceration and chronic superficial wounds.  Due to the degree of edema her completion amputation was delayed to provide the highest chance of adequate wound healing without risk of dehiscence and failure secondary to severe edema.     Options were discussed and ultimately decision was made to proceed with above knee amputation. Now POD #1      PLAN:  OK to resume PTA eliquis, ordered to start tonight  Dressing change to AKA tomorrow, 2/13  Continue LEILA drain  Multimodal pain control, pain team following  Activity as tolerated  Appreciate hospitalist    Discussed pt history, exam, assessment and plan with Dr. Dorman of the vascular surgery service, who  is in agreement with the above.    Bettye Felipe PA-C  VASCULAR SURGERY

## 2025-02-13 ENCOUNTER — APPOINTMENT (OUTPATIENT)
Dept: OCCUPATIONAL THERAPY | Facility: CLINIC | Age: 80
DRG: 240 | End: 2025-02-13
Attending: STUDENT IN AN ORGANIZED HEALTH CARE EDUCATION/TRAINING PROGRAM
Payer: COMMERCIAL

## 2025-02-13 LAB
ANION GAP SERPL CALCULATED.3IONS-SCNC: 10 MMOL/L (ref 7–15)
BACTERIA SPEC CULT: NO GROWTH
BACTERIA SPEC CULT: NORMAL
BACTERIA SPEC CULT: NORMAL
BACTERIA TISS BX CULT: ABNORMAL
BACTERIA TISS BX CULT: ABNORMAL
BACTERIA TISS BX CULT: NORMAL
BUN SERPL-MCNC: 30.4 MG/DL (ref 8–23)
CALCIUM SERPL-MCNC: 9.8 MG/DL (ref 8.8–10.4)
CHLORIDE SERPL-SCNC: 98 MMOL/L (ref 98–107)
CREAT SERPL-MCNC: 0.7 MG/DL (ref 0.51–0.95)
EGFRCR SERPLBLD CKD-EPI 2021: 87 ML/MIN/1.73M2
ERYTHROCYTE [DISTWIDTH] IN BLOOD BY AUTOMATED COUNT: 17 % (ref 10–15)
ERYTHROCYTE [DISTWIDTH] IN BLOOD BY AUTOMATED COUNT: 17.1 % (ref 10–15)
GLUCOSE BLDC GLUCOMTR-MCNC: 150 MG/DL (ref 70–99)
GLUCOSE BLDC GLUCOMTR-MCNC: 160 MG/DL (ref 70–99)
GLUCOSE BLDC GLUCOMTR-MCNC: 162 MG/DL (ref 70–99)
GLUCOSE BLDC GLUCOMTR-MCNC: 182 MG/DL (ref 70–99)
GLUCOSE BLDC GLUCOMTR-MCNC: 204 MG/DL (ref 70–99)
GLUCOSE SERPL-MCNC: 181 MG/DL (ref 70–99)
HCO3 SERPL-SCNC: 30 MMOL/L (ref 22–29)
HCT VFR BLD AUTO: 29.3 % (ref 35–47)
HCT VFR BLD AUTO: 30 % (ref 35–47)
HGB BLD-MCNC: 9.4 G/DL (ref 11.7–15.7)
HGB BLD-MCNC: 9.4 G/DL (ref 11.7–15.7)
MCH RBC QN AUTO: 28.7 PG (ref 26.5–33)
MCH RBC QN AUTO: 29.1 PG (ref 26.5–33)
MCHC RBC AUTO-ENTMCNC: 31.3 G/DL (ref 31.5–36.5)
MCHC RBC AUTO-ENTMCNC: 32.1 G/DL (ref 31.5–36.5)
MCV RBC AUTO: 91 FL (ref 78–100)
MCV RBC AUTO: 92 FL (ref 78–100)
PLATELET # BLD AUTO: 144 10E3/UL (ref 150–450)
PLATELET # BLD AUTO: 166 10E3/UL (ref 150–450)
POTASSIUM SERPL-SCNC: 3.9 MMOL/L (ref 3.4–5.3)
RBC # BLD AUTO: 3.23 10E6/UL (ref 3.8–5.2)
RBC # BLD AUTO: 3.27 10E6/UL (ref 3.8–5.2)
SODIUM SERPL-SCNC: 138 MMOL/L (ref 135–145)
WBC # BLD AUTO: 10.4 10E3/UL (ref 4–11)
WBC # BLD AUTO: 8.5 10E3/UL (ref 4–11)

## 2025-02-13 PROCEDURE — 97535 SELF CARE MNGMENT TRAINING: CPT | Mod: GO

## 2025-02-13 PROCEDURE — 250N000011 HC RX IP 250 OP 636: Performed by: STUDENT IN AN ORGANIZED HEALTH CARE EDUCATION/TRAINING PROGRAM

## 2025-02-13 PROCEDURE — 250N000013 HC RX MED GY IP 250 OP 250 PS 637: Performed by: PAIN MEDICINE

## 2025-02-13 PROCEDURE — 250N000011 HC RX IP 250 OP 636: Performed by: PAIN MEDICINE

## 2025-02-13 PROCEDURE — 82374 ASSAY BLOOD CARBON DIOXIDE: CPT | Performed by: STUDENT IN AN ORGANIZED HEALTH CARE EDUCATION/TRAINING PROGRAM

## 2025-02-13 PROCEDURE — 80048 BASIC METABOLIC PNL TOTAL CA: CPT | Performed by: STUDENT IN AN ORGANIZED HEALTH CARE EDUCATION/TRAINING PROGRAM

## 2025-02-13 PROCEDURE — 99232 SBSQ HOSP IP/OBS MODERATE 35: CPT | Performed by: HOSPITALIST

## 2025-02-13 PROCEDURE — 85027 COMPLETE CBC AUTOMATED: CPT | Performed by: STUDENT IN AN ORGANIZED HEALTH CARE EDUCATION/TRAINING PROGRAM

## 2025-02-13 PROCEDURE — 99232 SBSQ HOSP IP/OBS MODERATE 35: CPT | Performed by: PAIN MEDICINE

## 2025-02-13 PROCEDURE — 120N000001 HC R&B MED SURG/OB

## 2025-02-13 PROCEDURE — 36415 COLL VENOUS BLD VENIPUNCTURE: CPT | Performed by: STUDENT IN AN ORGANIZED HEALTH CARE EDUCATION/TRAINING PROGRAM

## 2025-02-13 PROCEDURE — 85041 AUTOMATED RBC COUNT: CPT | Performed by: STUDENT IN AN ORGANIZED HEALTH CARE EDUCATION/TRAINING PROGRAM

## 2025-02-13 PROCEDURE — 85014 HEMATOCRIT: CPT | Performed by: STUDENT IN AN ORGANIZED HEALTH CARE EDUCATION/TRAINING PROGRAM

## 2025-02-13 PROCEDURE — 250N000013 HC RX MED GY IP 250 OP 250 PS 637: Performed by: STUDENT IN AN ORGANIZED HEALTH CARE EDUCATION/TRAINING PROGRAM

## 2025-02-13 PROCEDURE — 97166 OT EVAL MOD COMPLEX 45 MIN: CPT | Mod: GO

## 2025-02-13 RX ORDER — METHADONE HYDROCHLORIDE 5 MG/1
5 TABLET ORAL EVERY 8 HOURS SCHEDULED
Status: DISCONTINUED | OUTPATIENT
Start: 2025-02-13 | End: 2025-02-17 | Stop reason: HOSPADM

## 2025-02-13 RX ORDER — ENOXAPARIN SODIUM 100 MG/ML
1 INJECTION SUBCUTANEOUS EVERY 12 HOURS
Status: DISCONTINUED | OUTPATIENT
Start: 2025-02-14 | End: 2025-02-13

## 2025-02-13 RX ORDER — HEPARIN SODIUM 10000 [USP'U]/100ML
0-5000 INJECTION, SOLUTION INTRAVENOUS CONTINUOUS
Status: DISCONTINUED | OUTPATIENT
Start: 2025-02-13 | End: 2025-02-14

## 2025-02-13 RX ADMIN — ACETAMINOPHEN 975 MG: 325 TABLET ORAL at 13:59

## 2025-02-13 RX ADMIN — ATORVASTATIN CALCIUM 40 MG: 40 TABLET, FILM COATED ORAL at 20:08

## 2025-02-13 RX ADMIN — METHADONE HYDROCHLORIDE 5 MG: 5 TABLET ORAL at 18:44

## 2025-02-13 RX ADMIN — MICONAZOLE NITRATE: 20 CREAM TOPICAL at 08:19

## 2025-02-13 RX ADMIN — METHADONE HYDROCHLORIDE 5 MG: 5 TABLET ORAL at 08:16

## 2025-02-13 RX ADMIN — HEPARIN SODIUM 1000 UNITS/HR: 10000 INJECTION, SOLUTION INTRAVENOUS at 23:45

## 2025-02-13 RX ADMIN — ZINC SULFATE 220 MG (50 MG) CAPSULE 220 MG: CAPSULE at 08:11

## 2025-02-13 RX ADMIN — ENOXAPARIN SODIUM 40 MG: 40 INJECTION SUBCUTANEOUS at 08:11

## 2025-02-13 RX ADMIN — ACETAMINOPHEN 975 MG: 325 TABLET ORAL at 08:11

## 2025-02-13 RX ADMIN — INSULIN DEGLUDEC 30 UNITS: 100 INJECTION, SOLUTION SUBCUTANEOUS at 07:46

## 2025-02-13 RX ADMIN — HYDROMORPHONE HYDROCHLORIDE 2 MG: 2 TABLET ORAL at 11:19

## 2025-02-13 RX ADMIN — MICONAZOLE NITRATE: 20 CREAM TOPICAL at 20:07

## 2025-02-13 RX ADMIN — HYDROMORPHONE HYDROCHLORIDE 0.1 MG: 0.2 INJECTION, SOLUTION INTRAMUSCULAR; INTRAVENOUS; SUBCUTANEOUS at 12:20

## 2025-02-13 RX ADMIN — HYDROMORPHONE HYDROCHLORIDE 0.1 MG: 0.2 INJECTION, SOLUTION INTRAMUSCULAR; INTRAVENOUS; SUBCUTANEOUS at 18:10

## 2025-02-13 RX ADMIN — METHOCARBAMOL 250 MG: 500 TABLET ORAL at 13:58

## 2025-02-13 RX ADMIN — TORSEMIDE 60 MG: 20 TABLET ORAL at 08:11

## 2025-02-13 RX ADMIN — HYDROMORPHONE HYDROCHLORIDE 2 MG: 2 TABLET ORAL at 15:04

## 2025-02-13 ASSESSMENT — ACTIVITIES OF DAILY LIVING (ADL)
ADLS_ACUITY_SCORE: 64
ADLS_ACUITY_SCORE: 60
ADLS_ACUITY_SCORE: 64
ADLS_ACUITY_SCORE: 64
ADLS_ACUITY_SCORE: 60
ADLS_ACUITY_SCORE: 64
ADLS_ACUITY_SCORE: 60
ADLS_ACUITY_SCORE: 64

## 2025-02-13 NOTE — PROGRESS NOTES
[facility] ACUTE INPATIENT PAIN SERVICE    [facility], [facility], [facility], [facility], [place]   PAIN follow up      Assessment/Plan:  Linda Loredo is a 79 year old female who was admitted on 2/11/2025.  I was asked by Navid Caceres MD  to see the patient for pain post R AKA. Admitted for planned amputation. History of CKD 3, constipation, osteomyelitis, depression, DM 2, fibromyalgia, insomnia, HTN, obesity, COPD, hyperlipidemia, opioid dependence, afib, Sees Viridiana Daly at [facility]. Has not been to in patient appt with Viridiana for a few months due to being in TCU and unable to get to appt. Hopes to get back soon to clinic.   Pain control extremely challenging      PLAN:  Acute pain secondary to surgery, in the setting of chronic pain.   Untreated BERLIN, hypoventilation, anxiety & existing opioid tolerance make this a very challenging situation.   Multimodal Medication Therapy:   Adjuvants: APAP 975mg q8h, gabapentin - hold- watch for sedation, robaxin 250mg q6hprn  Opioids: methadone QID 5mg- decrease to TID   Dilaudid po-  decreased to 2mg PRN yesterday   IV dilaudid for BTP pain  Non-medication interventions- Ice  Constipation Prophylaxis- miralax 17g BIDPRN  Follow up /Discharge Recommendations - We recommend prescribing the following at the time of discharge: TBD                Subjective:    Met with patient and daughter. Patient is sedated, head slumped down, but interacts when prompted. Either sleeping or crying.     History   Drug Use No         Tobacco Use      Smoking status: Never      Smokeless tobacco: Never        Objective:  Vital signs in last 24 hours:  B/P: 136/68, T: 98.4, P: 79, R: 19   Blood pressure 136/68, pulse 79, temperature 98.4  F (36.9  C), temperature source Oral, resp. rate 19, weight 83.6 kg (184 lb 4.9 oz), SpO2 100%.        Review of Systems:   As per subjective, all others negative.    Physical Exam    General: sleepy   HEENT: Head  normocephalic atraumatic, oral mucosa moist. Sclerae anicteric  CV: Regular rhythm, normal rate, no murmurs  Resp: diminished   GI:  hernia visible   Skin: bilateral amputation- acre wrap still in place           Imaging:  Personally Reviewed.    No results found for this visit on 02/11/25.     Lab Results:  Personally Reviewed.   Last Comprehensive Metabolic Panel:  Sodium   Date Value Ref Range Status   02/13/2025 138 135 - 145 mmol/L Final   02/22/2008 140 133 - 144 mmol/L Final     Potassium   Date Value Ref Range Status   02/13/2025 3.9 3.4 - 5.3 mmol/L Final   06/28/2022 4.3 3.5 - 5.0 mmol/L Final   02/22/2008 4.5 3.4 - 5.3 mmol/L Final     Chloride   Date Value Ref Range Status   02/13/2025 98 98 - 107 mmol/L Final   06/28/2022 97 (L) 98 - 107 mmol/L Final   02/22/2008 100 94 - 109 mmol/L Final     Carbon Dioxide   Date Value Ref Range Status   02/22/2008 28 20 - 32 mmol/L Final     Carbon Dioxide (CO2)   Date Value Ref Range Status   02/13/2025 30 (H) 22 - 29 mmol/L Final   06/28/2022 29 22 - 31 mmol/L Final     Anion Gap   Date Value Ref Range Status   02/13/2025 10 7 - 15 mmol/L Final   06/28/2022 13 5 - 18 mmol/L Final   02/22/2008 11 6 - 17 mmol/L Final     Glucose   Date Value Ref Range Status   02/13/2025 181 (H) 70 - 99 mg/dL Final   06/28/2022 73 70 - 125 mg/dL Final   02/22/2008 298 (H) 60 - 99 mg/dL Final     GLUCOSE BY METER POCT   Date Value Ref Range Status   02/13/2025 162 (H) 70 - 99 mg/dL Final     Comment:     /RN Notified     Urea Nitrogen   Date Value Ref Range Status   02/13/2025 30.4 (H) 8.0 - 23.0 mg/dL Final   06/28/2022 42 (H) 8 - 28 mg/dL Final   02/22/2008 15 7 - 30 mg/dL Final     Creatinine   Date Value Ref Range Status   02/13/2025 0.70 0.51 - 0.95 mg/dL Final   02/22/2008 0.82 0.60 - 1.30 mg/dL Final     GFR Estimate   Date Value Ref Range Status   02/13/2025 87 >60 mL/min/1.73m2 Final     Comment:     eGFR calculated using 2021 CKD-EPI equation.   02/22/2008 75 >60  "mL/min/1.7m2 Final     Calcium   Date Value Ref Range Status   02/13/2025 9.8 8.8 - 10.4 mg/dL Final   02/22/2008 10.4 8.5 - 10.4 mg/dL Final        UA: No results found for: \"UAMP\", \"UBARB\", \"BENZODIAZEUR\", \"UCANN\", \"UCOC\", \"OPIT\", \"UPCP\"           Please see A&P for additional details of medical decision making.  MANAGEMENT DISCUSSED with the following over the past 24 hours: patient, RN, and daughter   NOTE(S)/MEDICAL RECORDS REVIEWED over the past 24 hours: medicine and vascular  Tests personally interpreted in the past 24 hours:  - nov showing no dvt  Tests ORDERED & REVIEWED in the past 24 hours:  - mag  SUPPLEMENTAL HISTORY, in addition to the patient's history, over the past 24 hours obtained from:   - daughter  Medical complexity over the past 24 hours:  -------------------------- HIGH RISK FOR MORBIDITY -------------------------------------------------------------  - Parenteral (IV) CONTROLLED SUBSTANCES ordered           Tasia ROSA, CNS, CNP  Acute Care Pain Management  Team  Hours of pain coverage Mon-Fri 8-1600  After hours contact the primary team  [facility] (RASHID, Lacey, SD, RH)   Page via [employer] text web console -Click for [employer]            "

## 2025-02-13 NOTE — CONSULTS
SPIRITUAL HEALTH SERVICES Consult Note                Saw pt Linda DELGADILLO Awa Loredo per consult order.     Patient/Family Understanding of Illness and Goals of Care - Dtr shared Margaret has had two amputations and other surgeries over the last year that have impacted her physical, spiritual and emotional wellbeing.  Currently Margaret is experiencing terrible pain, weeping/crying through majority of visit.  She showed signs of exhaustion and fatigue having difficulty staying awake through visit.  Dtr shared they are have had difficulty managing Pats pain    Distress and Loss - Margaret expressed deep spiritual distress and named feeling abandoned and forgotten by God.  She lamented feeling like her prayers have gone unanswered. Because of her complicated health she has been isolated and expressed feeling deeply lonely.    Strengths, Coping, and Resources - Not explored    Meaning, Beliefs, and Spirituality - Margaret is Quaker and is part of many prayer groups, she attempts to pray the rosary but her pain prevents this.  Writer suggested Dtr play an audio version to provide both comfort and distraction from pain.  Writer shared prayer incorporating themes.     Plan of Care - [facility] will cont to assess and support through PAULA Franklin M.Div., [facility]  Staff    [phone]   Spiritual Health Services is available 24/7 for emergent requests and consults, either by paging the on-call  or by entering an ASAP/STAT consult in [employer], which will also page the on-call .

## 2025-02-13 NOTE — PROVIDER NOTIFICATION
Notified cross cover Monty Andrews and hospitalist Lefty Rosa there, pt is here for a R above knee amputation done on 2/11. Has a hx of chronic pain and also a L BKA. Takes 5mg methadone QID, and also has prn 2mg dilaudid on home list. It looks like she received both of these around 3:45 pm. When I got here she was pretty sedated, but after some movement was able to arouse her. She has been much more alert since. I hooked her back up to capnography, and now have her on a forehead pulse ox. Capnography is measuring in the high 40s up to mid 50s, highest was 56. Respirations have been about 12-14. O2 sats fluctuate, they drop and will come back up to mid-upper 90s. I did not give her evening methadone or any narcotic pain meds to her yet. She is very painful but I was not comfortable giving anything before touching base with you. Please advise, thank you! Lissa #[id number]      Dr. Andrews response:    Challenging. I'd say ok to the methadone, but I'd hold off on anything additional.  Methadone as the long acting analgesic will hopefully cut down on additional needs/wants

## 2025-02-13 NOTE — PROGRESS NOTES
Care Management Follow Up    Length of Stay (days): 2    Expected Discharge Date: 02/14/2025     Concerns to be Addressed:       Patient plan of care discussed at interdisciplinary rounds: Yes    Anticipated Discharge Disposition: Long Term Care        Anticipated Discharge Services: Transportation Services  Anticipated Discharge DME: None    Patient/family educated on Medicare website which has current facility and service quality ratings: no  Education Provided on the Discharge Plan: Yes  Patient/Family in Agreement with the Plan: yes    Referrals Placed by CM/SW:    Private pay costs discussed: Not applicable    Discussed  Partnership in Safe Discharge Planning  document with patient/family: No     Handoff Completed: No, handoff not indicated or clinically appropriate    Additional Information:  11:09 AM  YUDY updated [place] that pt is not medically ready to discharge today.      GRACE FunesSW

## 2025-02-13 NOTE — PLAN OF CARE
Problem: Adult Inpatient Plan of Care  Goal: Absence of Hospital-Acquired Illness or Injury  Intervention: Identify and Manage Fall Risk  Recent Flowsheet Documentation  Taken 2/13/2025 0352 by Lissa Strong RN  Safety Promotion/Fall Prevention: safety round/check completed  Taken 2/13/2025 0300 by Lissa Strong RN  Safety Promotion/Fall Prevention: safety round/check completed  Taken 2/12/2025 2327 by Lissa Strong RN  Safety Promotion/Fall Prevention: safety round/check completed  Taken 2/12/2025 2235 by Lissa Strong RN  Safety Promotion/Fall Prevention: safety round/check completed  Taken 2/12/2025 2045 by Lissa Strong RN  Safety Promotion/Fall Prevention: safety round/check completed   Goal Outcome Evaluation:    Pt very painful, does not tolerate turns or movement. Becomes very tearful. Pt also difficult to arouse at times (see previous note). Capnography applied, forehead pulse ox applied. Held off on prn dilaudid per MD advice. Daughter updated. A&O x4. VSS on RA. ACE in place to RLE. Purewick in place due to incontinence. Still no IV access. Pt reeducated about getting IV today.

## 2025-02-13 NOTE — PLAN OF CARE
Problem: Adult Inpatient Plan of Care  Goal: Absence of Hospital-Acquired Illness or Injury  Intervention: Identify and Manage Fall Risk  Recent Flowsheet Documentation  Taken 2/12/2025 5110 by Antione Sepulveda RN  Safety Promotion/Fall Prevention: safety round/check completed     Problem: Adult Inpatient Plan of Care  Goal: Optimal Comfort and Wellbeing  Outcome: Progressing   Goal Outcome Evaluation:       Patient vital signs are at baseline: Yes  Patient able to ambulate as they were prior to admission or with assist devices provided by therapies during their stay:  No,  Reason:  Wheelchair bound. BKA and AKA   Patient MUST void prior to discharge:  Yes, purewick in use   Patient able to tolerate oral intake:  Yes  Pain has adequate pain control using Oral analgesics:  Yes  Does patient have an identified :  Yes  Has goal D/C date and time been discussed with patient:  Yes        Pt alert and oriented times 4. VSS-RA. A2 to transfer to wheelchair and bed. No IV, pt is refusing at the moment but is willing to consider tomorrow. Abdulaziz drain not in suction. Purewick in place. ACE wrap over right LE. Placement pending

## 2025-02-13 NOTE — PLAN OF CARE
Goal Outcome Evaluation:      Plan of Care Reviewed With: patient    Overall Patient Progress: improvingOverall Patient Progress: improving    Outcome Evaluation: Pt drowsy this morning. Capno on while sleeping, POX WNL. Pain team made some changes to her methadone to get every 8 hours instead of four times a day. Gabapentin also held due to drowsiness. Conservative with PO dilaudid this shift, not giving at the same time as methadone. Trying to coordinate with vascular in order to give the dilaudid prior to a dressing change. LEILA drain not compressing. Pt is incont of urine, external catheter in place but not always functioning. Pt's buttocks is red, some open areas noted. Mepi applied to sacrum. Pt is resistent to repositions in bed but is able to help with rolling during bed changes. Discharge plan pending.      Problem: Adult Inpatient Plan of Care  Goal: Optimal Comfort and Wellbeing  Intervention: Monitor Pain and Promote Comfort  Recent Flowsheet Documentation  Taken 2/13/2025 0858 by Louie Doherty RN  Pain Management Interventions:   MD notified (comment)   diversional activity provided   emotional support   pain management plan reviewed with patient/caregiver  Taken 2/13/2025 0811 by Louie Doherty RN  Pain Management Interventions:   medication (see MAR)   pain management plan reviewed with patient/caregiver     Problem: Pain Acute  Goal: Optimal Pain Control and Function  Intervention: Develop Pain Management Plan  Recent Flowsheet Documentation  Taken 2/13/2025 0858 by Louie Doherty RN  Pain Management Interventions:   MD notified (comment)   diversional activity provided   emotional support   pain management plan reviewed with patient/caregiver  Taken 2/13/2025 0811 by Louie Doherty RN  Pain Management Interventions:   medication (see MAR)   pain management plan reviewed with patient/caregiver  Intervention: Prevent or Manage Pain  Recent Flowsheet Documentation  Taken 2/13/2025 0811 by  Louie Doherty, RN  Medication Review/Management:   medications reviewed   high-risk medications identified     Problem: Comorbidity Management  Goal: Blood Glucose Levels Within Targeted Range  Intervention: Monitor and Manage Glycemia  Recent Flowsheet Documentation  Taken 2/13/2025 0811 by Louie Doherty, RN  Medication Review/Management:   medications reviewed   high-risk medications identified     Louie Doherty RN, ONC

## 2025-02-13 NOTE — PROGRESS NOTES
Vascular Surgery Note    Patient s/e today. Worked with therapies yesterday and today. Reports good pain control today. AKA dressings soaked with urine. LEILA drain with scant serosanguinous output, no longer holding seal.     Dressings taken down, incision healing well with no drainage or dehiscence, staples in place. No erythema, fluctuance, or other SOI. New island dressings placed over incision for wound protection.     - Please change dressings whenever soiled or if patient voids onto dressings.   - Continue PT/OT  - Medicine following, will discuss transfer to Medicine now that dressings have been removed, drain is out, and patient does not require any further surgical intervention.   - Vascular Surgery clinic follow up 3/10/25      Hilda Aceves MD  PGY-6  Vascular Surgery  Pager: [phone]    Please page me directly with any questions/concerns during regular weekday hours before 5PM. If there is no response, if it is a weekend, or if it is during evening hours, then please use the [employer] system to page the first-call resident on call for vascular surgery.

## 2025-02-13 NOTE — PROGRESS NOTES
SPIRITUAL HEALTH SERVICES ATTEMPTED VISIT          reviewed patient chart to assess emotional/spiritual wellbeing due POC for follow up visit.    Writer attempted to visit multiple times each time pt was sleeping, unable to assess emotional/spiritual wellbeing.        PLAN: [facility]S will continue to monitor and visit.    Tyler Franklin M.Div., [facility]  Staff    [phone]   Spiritual Health Services is available 24/7 for emergent requests and consults, either by paging the on-call  or by entering an ASAP/STAT consult in [employer], which will also page the on-call .

## 2025-02-13 NOTE — PROGRESS NOTES
[facility]    Medicine Progress Note - Hospitalist Service    Date of Admission:  2/11/2025    Assessment & Plan   Linda ELIE Loredo is a 79 year old female admitted on 2/11/2025. She has a past medical history significant for type 2 diabetes, CKD stage III, chronic pain syndrome, paroxysmal A-fib, chronic right-sided heart failure, peripheral arterial disease, and BERLIN who had a recent guillotine below-knee amputation complicated by poor wound healing admitted for right above-knee amputation.  Medicine was consulted for medical comanagement.    Today:  Pain control, glycemic control improving, new mild thrombocytopenia  No growth on wound culture  -A.m. CBC  -BMP  -check UA  *Consider enema if no bowel movement  *Consider VBG and stat glucose if increasingly somnolent  *Discussed with RN    Normocytic anemia: Baseline hemoglobin is around 11  Thrombocytopenia    Somnolent    PAD  S/P R. AKA  -Analgesics, antiemetics, DVT prophylaxis, and therapies per surgical team   -PTA statin    History of A-fib  -PTA apixaban once safe to do so from surgical standpoint  -Is not on any rate controlling therapy or antiarrhythmics  -Monitor for any signs of tachycardia    Insulin-dependent type 2 diabetes  -Resumed home Tresiba on high-dose sliding scale insulin    HFpEF  -PTA torsemide holding parameters    Chronic pain  -Agree with pain team consult, pain otherwise per primary team    CKD stage III  -Avoid nephrotoxins trend    Hx of BERLIN  -Does not use a CPAP but she cannot tolerate it, consider pulse ox if patient heavily sedated at risk for postop hypoventilation          Diet: Moderate Consistent Carb (60 g CHO per Meal) Diet    DVT Prophylaxis: Enoxaparin (Lovenox) SQ  Braga Catheter: Not present  Lines: None     Cardiac Monitoring: None  Code Status: Full Code      Clinically Significant Risk Factors          # Hypochloremia: Lowest Cl = 96 mmol/L in last 2 days, will monitor as appropriate           # Hypertension: Noted on problem list           # DMII: A1C = 7.2 % (Ref range: <5.7 %) within past 6 months       # Financial/Environmental Concerns: none         Social Drivers of Health     [facility]    Social Connections          Disposition Plan     Medically Ready for Discharge: Anticipated in 2-4 Days             Miguel Carter MD  Hospitalist Service  [facility]  Securely message with Where I've Been (more info)  Text page via [employer] Paging/Directory   ______________________________________________________________________    Interval History   Nursing has had to hold methadone due to sleepiness  She is not a very thorough historian with me  Says that her pain is similar to her chronic neuropathy which she thinks is secondary to diabetes  Afebrile, hemodynamically stable  No bowel movement 3 days    Physical Exam   Vital Signs: Temp: 98.4  F (36.9  C) Temp src: Oral BP: 136/68 Pulse: 79   Resp: 19 SpO2: 100 % O2 Device: None (Room air)    Weight: 184 lbs 4.87 oz    Bilateral amputee somewhat sleepy in the bed  Does awaken to voice  Answers questions with few words but generally appropriate  Accompanied by her daughter  Regular rate and rhythm  Lungs clear to auscultation bilaterally  Abdomen minimally distended    Medical Decision Making       42 MINUTES SPENT BY ME on the date of service doing chart review, history, exam, documentation & further activities per the note.      Data   ------------------------- PAST 24 HR DATA REVIEWED -----------------------------------------------    I have personally reviewed the following data over the past 24 hrs:    10.4  \   9.4 (L)   / 144 (L)     138 98 30.4 (H) /  150 (H)   3.9 30 (H) 0.70 \       Imaging results reviewed over the past 24 hrs:   No results found for this or any previous visit (from the past 24 hours).

## 2025-02-14 VITALS
OXYGEN SATURATION: 100 % | SYSTOLIC BLOOD PRESSURE: 141 MMHG | TEMPERATURE: 97.4 F | HEART RATE: 80 BPM | BODY MASS INDEX: 29.11 KG/M2 | WEIGHT: 180.34 LBS | DIASTOLIC BLOOD PRESSURE: 67 MMHG | RESPIRATION RATE: 17 BRPM

## 2025-02-14 LAB
ALBUMIN UR-MCNC: NEGATIVE MG/DL
ANION GAP SERPL CALCULATED.3IONS-SCNC: 9 MMOL/L (ref 7–15)
APPEARANCE UR: CLEAR
BACTERIA #/AREA URNS HPF: ABNORMAL /HPF
BILIRUB UR QL STRIP: NEGATIVE
BUN SERPL-MCNC: 21.2 MG/DL (ref 8–23)
CALCIUM SERPL-MCNC: 9.6 MG/DL (ref 8.8–10.4)
CHLORIDE SERPL-SCNC: 98 MMOL/L (ref 98–107)
COLOR UR AUTO: ABNORMAL
CREAT SERPL-MCNC: 0.54 MG/DL (ref 0.51–0.95)
EGFRCR SERPLBLD CKD-EPI 2021: >90 ML/MIN/1.73M2
ERYTHROCYTE [DISTWIDTH] IN BLOOD BY AUTOMATED COUNT: 17.2 % (ref 10–15)
GLUCOSE BLDC GLUCOMTR-MCNC: 144 MG/DL (ref 70–99)
GLUCOSE BLDC GLUCOMTR-MCNC: 146 MG/DL (ref 70–99)
GLUCOSE BLDC GLUCOMTR-MCNC: 157 MG/DL (ref 70–99)
GLUCOSE BLDC GLUCOMTR-MCNC: 173 MG/DL (ref 70–99)
GLUCOSE SERPL-MCNC: 171 MG/DL (ref 70–99)
GLUCOSE UR STRIP-MCNC: NEGATIVE MG/DL
HCO3 SERPL-SCNC: 33 MMOL/L (ref 22–29)
HCT VFR BLD AUTO: 30.4 % (ref 35–47)
HGB BLD-MCNC: 9.6 G/DL (ref 11.7–15.7)
HGB UR QL STRIP: ABNORMAL
KETONES UR STRIP-MCNC: NEGATIVE MG/DL
LEUKOCYTE ESTERASE UR QL STRIP: ABNORMAL
MCH RBC QN AUTO: 28.7 PG (ref 26.5–33)
MCHC RBC AUTO-ENTMCNC: 31.6 G/DL (ref 31.5–36.5)
MCV RBC AUTO: 91 FL (ref 78–100)
NITRATE UR QL: NEGATIVE
PH UR STRIP: 7.5 [PH] (ref 5–7)
PLATELET # BLD AUTO: 179 10E3/UL (ref 150–450)
POTASSIUM SERPL-SCNC: 3.4 MMOL/L (ref 3.4–5.3)
RBC # BLD AUTO: 3.34 10E6/UL (ref 3.8–5.2)
RBC URINE: 8 /HPF
SODIUM SERPL-SCNC: 140 MMOL/L (ref 135–145)
SP GR UR STRIP: 1.01 (ref 1–1.03)
SQUAMOUS EPITHELIAL: 3 /HPF
UFH PPP CHRO-ACNC: 0.53 IU/ML
UROBILINOGEN UR STRIP-MCNC: <2 MG/DL
WBC # BLD AUTO: 8.3 10E3/UL (ref 4–11)
WBC URINE: 30 /HPF

## 2025-02-14 PROCEDURE — 250N000013 HC RX MED GY IP 250 OP 250 PS 637: Performed by: HOSPITALIST

## 2025-02-14 PROCEDURE — 99233 SBSQ HOSP IP/OBS HIGH 50: CPT | Performed by: HOSPITALIST

## 2025-02-14 PROCEDURE — 120N000001 HC R&B MED SURG/OB

## 2025-02-14 PROCEDURE — 250N000013 HC RX MED GY IP 250 OP 250 PS 637: Performed by: STUDENT IN AN ORGANIZED HEALTH CARE EDUCATION/TRAINING PROGRAM

## 2025-02-14 PROCEDURE — 84520 ASSAY OF UREA NITROGEN: CPT | Performed by: HOSPITALIST

## 2025-02-14 PROCEDURE — 250N000011 HC RX IP 250 OP 636: Performed by: STUDENT IN AN ORGANIZED HEALTH CARE EDUCATION/TRAINING PROGRAM

## 2025-02-14 PROCEDURE — 250N000009 HC RX 250: Performed by: PAIN MEDICINE

## 2025-02-14 PROCEDURE — 82306 VITAMIN D 25 HYDROXY: CPT | Performed by: HOSPITALIST

## 2025-02-14 PROCEDURE — 36415 COLL VENOUS BLD VENIPUNCTURE: CPT | Performed by: HOSPITALIST

## 2025-02-14 PROCEDURE — 85520 HEPARIN ASSAY: CPT | Performed by: STUDENT IN AN ORGANIZED HEALTH CARE EDUCATION/TRAINING PROGRAM

## 2025-02-14 PROCEDURE — 80048 BASIC METABOLIC PNL TOTAL CA: CPT | Performed by: HOSPITALIST

## 2025-02-14 PROCEDURE — 250N000011 HC RX IP 250 OP 636: Performed by: PHYSICIAN ASSISTANT

## 2025-02-14 PROCEDURE — 87086 URINE CULTURE/COLONY COUNT: CPT | Performed by: HOSPITALIST

## 2025-02-14 PROCEDURE — 250N000013 HC RX MED GY IP 250 OP 250 PS 637: Performed by: PAIN MEDICINE

## 2025-02-14 PROCEDURE — 85027 COMPLETE CBC AUTOMATED: CPT | Performed by: HOSPITALIST

## 2025-02-14 PROCEDURE — 81003 URINALYSIS AUTO W/O SCOPE: CPT | Performed by: HOSPITALIST

## 2025-02-14 PROCEDURE — 250N000011 HC RX IP 250 OP 636: Mod: JZ | Performed by: PAIN MEDICINE

## 2025-02-14 PROCEDURE — 99232 SBSQ HOSP IP/OBS MODERATE 35: CPT | Performed by: PAIN MEDICINE

## 2025-02-14 PROCEDURE — 82310 ASSAY OF CALCIUM: CPT | Performed by: HOSPITALIST

## 2025-02-14 RX ORDER — ENOXAPARIN SODIUM 100 MG/ML
1 INJECTION SUBCUTANEOUS EVERY 12 HOURS
Status: DISCONTINUED | OUTPATIENT
Start: 2025-02-14 | End: 2025-02-14

## 2025-02-14 RX ORDER — MAGNESIUM OXIDE 400 MG/1
400 TABLET ORAL
Status: DISCONTINUED | OUTPATIENT
Start: 2025-02-14 | End: 2025-02-17 | Stop reason: HOSPADM

## 2025-02-14 RX ORDER — MAGNESIUM OXIDE 400 MG/1
400 TABLET ORAL DAILY
Status: DISCONTINUED | OUTPATIENT
Start: 2025-02-14 | End: 2025-02-14

## 2025-02-14 RX ORDER — SENNOSIDES 8.6 MG
2 TABLET ORAL 2 TIMES DAILY
Status: DISCONTINUED | OUTPATIENT
Start: 2025-02-14 | End: 2025-02-17 | Stop reason: HOSPADM

## 2025-02-14 RX ORDER — LIDOCAINE 50 MG/G
OINTMENT TOPICAL 3 TIMES DAILY
Status: DISCONTINUED | OUTPATIENT
Start: 2025-02-14 | End: 2025-02-17 | Stop reason: HOSPADM

## 2025-02-14 RX ORDER — HYDROMORPHONE HCL IN WATER/PF 6 MG/30 ML
0.2 PATIENT CONTROLLED ANALGESIA SYRINGE INTRAVENOUS EVERY 6 HOURS PRN
Status: DISCONTINUED | OUTPATIENT
Start: 2025-02-14 | End: 2025-02-17

## 2025-02-14 RX ADMIN — METHADONE HYDROCHLORIDE 5 MG: 5 TABLET ORAL at 02:43

## 2025-02-14 RX ADMIN — APIXABAN 5 MG: 5 TABLET, FILM COATED ORAL at 21:51

## 2025-02-14 RX ADMIN — ACETAMINOPHEN 975 MG: 325 TABLET ORAL at 06:37

## 2025-02-14 RX ADMIN — HYDROMORPHONE HYDROCHLORIDE 2 MG: 2 TABLET ORAL at 23:55

## 2025-02-14 RX ADMIN — ATORVASTATIN CALCIUM 40 MG: 40 TABLET, FILM COATED ORAL at 20:14

## 2025-02-14 RX ADMIN — METHOCARBAMOL 250 MG: 500 TABLET ORAL at 08:01

## 2025-02-14 RX ADMIN — HYDROMORPHONE HYDROCHLORIDE 2 MG: 2 TABLET ORAL at 06:04

## 2025-02-14 RX ADMIN — HYDROMORPHONE HYDROCHLORIDE 0.1 MG: 0.2 INJECTION, SOLUTION INTRAMUSCULAR; INTRAVENOUS; SUBCUTANEOUS at 11:50

## 2025-02-14 RX ADMIN — METHADONE HYDROCHLORIDE 5 MG: 5 TABLET ORAL at 14:00

## 2025-02-14 RX ADMIN — METHADONE HYDROCHLORIDE 5 MG: 5 TABLET ORAL at 21:51

## 2025-02-14 RX ADMIN — INSULIN DEGLUDEC 30 UNITS: 100 INJECTION, SOLUTION SUBCUTANEOUS at 07:26

## 2025-02-14 RX ADMIN — TORSEMIDE 60 MG: 20 TABLET ORAL at 07:49

## 2025-02-14 RX ADMIN — ACETAMINOPHEN 975 MG: 325 TABLET ORAL at 23:54

## 2025-02-14 RX ADMIN — ACETAMINOPHEN 975 MG: 325 TABLET ORAL at 14:00

## 2025-02-14 RX ADMIN — Medication 400 MG: at 14:00

## 2025-02-14 RX ADMIN — HYDROMORPHONE HYDROCHLORIDE 2 MG: 2 TABLET ORAL at 15:55

## 2025-02-14 RX ADMIN — ZINC SULFATE 220 MG (50 MG) CAPSULE 220 MG: CAPSULE at 07:49

## 2025-02-14 RX ADMIN — LIDOCAINE: 50 OINTMENT TOPICAL at 11:08

## 2025-02-14 RX ADMIN — HYDROMORPHONE HYDROCHLORIDE 0.2 MG: 0.2 INJECTION, SOLUTION INTRAMUSCULAR; INTRAVENOUS; SUBCUTANEOUS at 17:54

## 2025-02-14 RX ADMIN — HYDROMORPHONE HYDROCHLORIDE 2 MG: 2 TABLET ORAL at 10:56

## 2025-02-14 RX ADMIN — HEPARIN SODIUM 800 UNITS/HR: 10000 INJECTION, SOLUTION INTRAVENOUS at 07:32

## 2025-02-14 RX ADMIN — ENOXAPARIN SODIUM 80 MG: 100 INJECTION SUBCUTANEOUS at 11:50

## 2025-02-14 ASSESSMENT — ACTIVITIES OF DAILY LIVING (ADL)
ADLS_ACUITY_SCORE: 64
ADLS_ACUITY_SCORE: 62
ADLS_ACUITY_SCORE: 66
ADLS_ACUITY_SCORE: 62
ADLS_ACUITY_SCORE: 66
ADLS_ACUITY_SCORE: 64
ADLS_ACUITY_SCORE: 66
ADLS_ACUITY_SCORE: 64
ADLS_ACUITY_SCORE: 64
ADLS_ACUITY_SCORE: 62
ADLS_ACUITY_SCORE: 62
ADLS_ACUITY_SCORE: 66
ADLS_ACUITY_SCORE: 64
ADLS_ACUITY_SCORE: 62
ADLS_ACUITY_SCORE: 64
ADLS_ACUITY_SCORE: 62
ADLS_ACUITY_SCORE: 62
ADLS_ACUITY_SCORE: 66
ADLS_ACUITY_SCORE: 64
ADLS_ACUITY_SCORE: 66
ADLS_ACUITY_SCORE: 64
ADLS_ACUITY_SCORE: 62
ADLS_ACUITY_SCORE: 64

## 2025-02-14 NOTE — PROGRESS NOTES
[facility] ACUTE INPATIENT PAIN SERVICE    [facility], [facility], [facility], [facility], [place]   PAIN follow up      Assessment/Plan:  Linda Loredo is a 79 year old female who was admitted on 2/11/2025.  I was asked by Navid Caceres MD  to see the patient for pain post R AKA. Admitted for planned amputation. History of CKD 3, constipation, osteomyelitis, depression, DM 2, fibromyalgia, insomnia, HTN, obesity, COPD, hyperlipidemia, opioid dependence, afib, Sees Viridiana Daly at [facility]. Pain control has been challenging (either somnolent or crying out). In the 24 hours utilized 15mg of methadonr and 6mg po dilaudid 1 dose of IV dilaudid.      PLAN:  Acute pain secondary to surgery, in the setting of chronic pain.   Untreated BERLIN, hypoventilation, anxiety & existing opioid tolerance make this a very challenging situation.   Multimodal Medication Therapy:   Adjuvants: APAP 975mg q8h, gabapentin - hold- watch for sedation, robaxin 250mg q6hprn, add lidocaine   Opioids: methadone QID 5mg- decreased to TID given somnolence  Dilaudid po-  2mg PRN  IV dilaudid PRN   Non-medication interventions- Ice  Constipation Prophylaxis- senna BID & miralax 17g BIDPRN- no BM yet  Follow up /Discharge Recommendations - We recommend prescribing the following at the time of discharge: TBD                Subjective:    Met with patient- very sleepy but conversant.     History   Drug Use No         Tobacco Use      Smoking status: Never      Smokeless tobacco: Never        Objective:  Vital signs in last 24 hours:  B/P: 136/68, T: 98.4, P: 79, R: 19   Blood pressure 131/64, pulse 71, temperature 98.1  F (36.7  C), temperature source Oral, resp. rate 16, weight 81.8 kg (180 lb 5.4 oz), SpO2 99%.        Review of Systems:   As per subjective, all others negative.    Physical Exam    General: sleepy - but talkative   HEENT: Head normocephalic atraumatic, oral mucosa moist. Sclerae anicteric  CV: Regular  rhythm, normal rate, no murmurs  Resp: diminished   GI:  hernia visible   Skin: bilateral amputation- dressing in place, ace wrap off now           Imaging:  Personally Reviewed.    No results found for this visit on 02/11/25.     Lab Results:  Personally Reviewed.   Last Comprehensive Metabolic Panel:  Sodium   Date Value Ref Range Status   02/14/2025 140 135 - 145 mmol/L Final   02/22/2008 140 133 - 144 mmol/L Final     Potassium   Date Value Ref Range Status   02/14/2025 3.4 3.4 - 5.3 mmol/L Final   06/28/2022 4.3 3.5 - 5.0 mmol/L Final   02/22/2008 4.5 3.4 - 5.3 mmol/L Final     Chloride   Date Value Ref Range Status   02/14/2025 98 98 - 107 mmol/L Final   06/28/2022 97 (L) 98 - 107 mmol/L Final   02/22/2008 100 94 - 109 mmol/L Final     Carbon Dioxide   Date Value Ref Range Status   02/22/2008 28 20 - 32 mmol/L Final     Carbon Dioxide (CO2)   Date Value Ref Range Status   02/14/2025 33 (H) 22 - 29 mmol/L Final   06/28/2022 29 22 - 31 mmol/L Final     Anion Gap   Date Value Ref Range Status   02/14/2025 9 7 - 15 mmol/L Final   06/28/2022 13 5 - 18 mmol/L Final   02/22/2008 11 6 - 17 mmol/L Final     Glucose   Date Value Ref Range Status   02/14/2025 171 (H) 70 - 99 mg/dL Final   06/28/2022 73 70 - 125 mg/dL Final   02/22/2008 298 (H) 60 - 99 mg/dL Final     GLUCOSE BY METER POCT   Date Value Ref Range Status   02/14/2025 173 (H) 70 - 99 mg/dL Final     Urea Nitrogen   Date Value Ref Range Status   02/14/2025 21.2 8.0 - 23.0 mg/dL Final   06/28/2022 42 (H) 8 - 28 mg/dL Final   02/22/2008 15 7 - 30 mg/dL Final     Creatinine   Date Value Ref Range Status   02/14/2025 0.54 0.51 - 0.95 mg/dL Final   02/22/2008 0.82 0.60 - 1.30 mg/dL Final     GFR Estimate   Date Value Ref Range Status   02/14/2025 >90 >60 mL/min/1.73m2 Final     Comment:     eGFR calculated using 2021 CKD-EPI equation.   02/22/2008 75 >60 mL/min/1.7m2 Final     Calcium   Date Value Ref Range Status   02/14/2025 9.6 8.8 - 10.4 mg/dL Final  "  02/22/2008 10.4 8.5 - 10.4 mg/dL Final        UA: No results found for: \"UAMP\", \"UBARB\", \"BENZODIAZEUR\", \"UCANN\", \"UCOC\", \"OPIT\", \"UPCP\"           Please see A&P for additional details of medical decision making.  MANAGEMENT DISCUSSED with the following over the past 24 hours: patient, RN, and daughter   NOTE(S)/MEDICAL RECORDS REVIEWED over the past 24 hours: medicine and vascular  Tests personally interpreted in the past 24 hours:  - nov showing no dvt  Tests ORDERED & REVIEWED in the past 24 hours:  - mag  SUPPLEMENTAL HISTORY, in addition to the patient's history, over the past 24 hours obtained from:   - daughter  Medical complexity over the past 24 hours:  -------------------------- HIGH RISK FOR MORBIDITY -------------------------------------------------------------  - Parenteral (IV) CONTROLLED SUBSTANCES ordered           Tasia ROSA, CNS, CNP  Acute Care Pain Management  Team  Hours of pain coverage Mon-Fri 8-1600  After hours contact the primary team  [facility] (RASHID, Lacey, SD, RH)   Page via [employer] web console -Click for [employer]            "

## 2025-02-14 NOTE — PLAN OF CARE
Patient is on 2L oxygen via nasal cannula. Patient has a purewick in place. Bed was changed this shift. Pain was elevated at a rating of 9 or 10/10 this shift. Patient refused position change until her pain was better managed. PRNs were given (see MAR). Orthotics came and fitted patient for a limb protector. Vascular MD changed surgical dressing and removed LEILA drain. ACHS continued. 60 gm carb diet continued.     China Kowalski RN on 2/13/2025 at 10:15 PM

## 2025-02-14 NOTE — PLAN OF CARE
Patients nasal cannula removed d/t oxygen saturation remaining at 97%. Patient moaning in between care though denies pain medication at this time, except for her scheduled methadone. Alert and Oriented x 4. She uses a wheelchair at baseline and is on Q2 turns as well as contact precautions. Patient refuses positioning by staff but will move independently somewhat per request. Patient was given heparin starting at 1130 pm 2/13 per new orders and is scheduled to be lab drawn for Xa at 5am 2/14. Purwick is in place and assessed, urine was noted to be present but patient refused changing till 0530. Plan is to go to a long term care facility with home care and OT. Per  note plan is to discharge today 2/14 to [facility], unsure if this is current plan as heparin was started.

## 2025-02-14 NOTE — PROVIDER NOTIFICATION
RN paged Vascular surgery regarding reviewing anticoagulation therapies for pt.    No response.       Kaylee Bueno RN on 2/13/2025 at 5:00 PM

## 2025-02-14 NOTE — PROGRESS NOTES
SPIRITUAL HEALTH SERVICES Progress Note                Saw pt Linda ELIE Loredo per follow up visit to provide emotional support.       Patient/Family Understanding of Illness and Goals of Care - Margaret was more alert and aware from last visit,  She continue to show signs of pain, grimacing and pausing to breath through the visit.  She named a desire to return to her AL in [place]    Distress and Loss - Margaret was distressed by the idea of having to return to LTC verses a TCU.  She engaged in grief work reflecting in the themes of personal dignity, a need to feel respected and processing how her amputations have impacted these.     Strengths, Coping, and Resources - She continues to be supported by her dtr Julieta who is a good advocate and support.  Writer spoke with Julieta over the phone updating her of care provided    Meaning, Beliefs, and Spirituality - Not explored this visit    Plan of Care - [facility] will cont to visit through PAULA Franklin M.Div., [facility]  Staff    [phone]   Spiritual Health Services is available 24/7 for emergent requests and consults, either by paging the on-call  or by entering an ASAP/STAT consult in [employer], which will also page the on-call .

## 2025-02-14 NOTE — PROGRESS NOTES
Care Management Follow Up    Length of Stay (days): 3    Expected Discharge Date: 02/15/2025     Concerns to be Addressed:       Patient plan of care discussed at interdisciplinary rounds: Yes    Anticipated Discharge Disposition: Long Term Care         Anticipated Discharge Services: Transportation Services  Anticipated Discharge DME: None    Patient/family educated on Medicare website which has current facility and service quality ratings: no  Education Provided on the Discharge Plan: Yes  Patient/Family in Agreement with the Plan: yes    Referrals Placed by CM/SW:    Private pay costs discussed: Not applicable    Discussed  Partnership in Safe Discharge Planning  document with patient/family: No     Handoff Completed: No, handoff not indicated or clinically appropriate    Additional Information:  2:39 PM  YUDY spoke with Claudette at [facility] [place] [facility].  Inquired if pt can return to LTC over the weekend.  Claudette will discuss with staff and get back to .    3:56 PM  VM received from Claudette at [facility] [place] [facility].  Clinical team states pt can't return over the weekend due to complexity.  Pt can return first thing on Monday morning.          JAVIER Funes

## 2025-02-14 NOTE — PROGRESS NOTES
"S: Pat is a 79 yof who was seen today at [facility], Room 356, for fitting and delivery of an AK limb protector for her right residual limb. Pt is s/p right transfemoral amputation performed by Dr. Dorman on 2/11/25. Pt has a healed left BKA as well. Contact precautions in place at time of visit.  DX: Z89.611 (Right AKA)  O: 5' 6\", 180 lbs. Right residual limb incision site was covered in bandaging. Short residual limb.    A: The patient was fit and provided with:  1 Rooke AK limb protector - protect residual limb from mild impacts  1 Waist Belt - to suspend the limb protector  's written instructions were provided. Pt's nurse was shown how to don and doff the protector as well. Waist belt to be used with the protector when pt is transferring.    P: Pt is to wear the limb protector according to physician's instructions. Pt is unsure at this time if she would like to pursue prosthetic care in the future. Unclear if she will be a good prosthetic candidate. I will be in touch with Pat on a weekly basis to check in on her and see how healing is going.    G: Provide protection to newly amputated residual limb to promote healing and prevent injury to the amputation site.     Electronically signed by Yael Orlando CPO, NAYANO  "

## 2025-02-14 NOTE — PROGRESS NOTES
[facility]    Medicine Progress Note - Hospitalist Service    Date of Admission:  2/11/2025    Assessment & Plan   Identification: Linda Loredo is a 79 year old female retired   PMHx: CKD 3, chronic pain (neuropathy), insulin-dependent type 2 diabetes, paroxysmal atrial fibrillation, right-sided heart failure, PAD, BERLIN, osteomyelitis    On February 11, she was admitted under vascular surgery. 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  After the BKA, she developed lymphedema and chronic wounds.  She therefore underwent right above-knee amputation with 100 mL EBL with LEILA drain.  [facility] was consulted for medical comanagement.  She struggled with pain control postoperatively, and pain team was consulted to assist.  She was fairly somnolent postop day 1 and 2, likely due to pain medication titration.  UA was obtained due to somnolence, with some mild abnormality and pyuria.  Wound culture grew 1+ Corynebacterium striated.    Today:  Much more awake, fairly asymptomatic in the bed, thrombocytopenia resolved, hemoglobin stabilized  Heparin drip converted to Lovenox, still using IV Dilaudid glycemic control improved  Wound culture with 1+ Corynebacterium stratum  -A.m. BMP, might need diuretic held if alkalosis worsening  -Start magnesium supplementation, check vitamin D  *If no BM, probably needs enema    Constipation: Needs to have a bowel movement    Normocytic anemia: Baseline hemoglobin is around 11    Abnormal urinalysis: Will watch the culture    PAD S/P Right AKA  -Analgesics, antiemetics, DVT prophylaxis, and therapies per surgical team   -PTA atorvastatin 40 mg daily    History of A-fib  -PTA apixaban once safe to do so from surgical standpoint  -Is not on any rate controlling therapy or antiarrhythmics  -Monitor for any signs of tachycardia    Insulin-dependent type 2 diabetes: A1c 7.2%  -Tresiba 30 units  daily  -High intensity sliding scale insulin  -Magnesium oxide 400 mg every 48    HFpEF  Metabolic alkalosis  -PTA torsemide 60 mg daily    Chronic pain  -Agree with pain team consult, pain otherwise per primary team    CKD stage III  -Avoid nephrotoxins trend    Hx of BERLIN: Poor tolerance of CPAP        Diet: Moderate Consistent Carb (60 g CHO per Meal) Diet    DVT Prophylaxis: Enoxaparin (Lovenox) SQ  Braga Catheter: Not present  Lines: None     Cardiac Monitoring: None  Code Status: Full Code      Clinically Significant Risk Factors                   # Hypertension: Noted on problem list           # DMII: A1C = 7.2 % (Ref range: <5.7 %) within past 6 months       # Financial/Environmental Concerns: none         Social Drivers of Health     Received from [employer]    Social Connections          Disposition Plan     Medically Ready for Discharge: Anticipated Tomorrow             Miguel Carter MD  Hospitalist Service  [facility]  Securely message with [employer]" (more info)  Text page via [employer] Paging/Directory   ______________________________________________________________________    Interval History   Pain reasonably controlled, hide poor sleep overnight  Off supplemental O2, was started on heparin gtt.  Vital signs fairly controlled, afebrile  Bicarbonate 33  Hemoglobin 9.6  Urinalysis with 30 WBC, 3 squamous cells, nitrite negative    Physical Exam   Vital Signs: Temp: 98.1  F (36.7  C) Temp src: Oral BP: 131/64 Pulse: 71   Resp: 16 SpO2: 99 % O2 Device: None (Room air) Oxygen Delivery: 2 LPM  Weight: 180 lbs 5.38 oz    Awake sitting in the bed, somnolence resolved  Chronically ill-appearing, no acute distress  In conversation with her daughter on speaker phone  Regular rate and rhythm without murmur  Lungs clear to auscultation bilaterally, breathing comfortably on room air  Mildly distended abdomen,  nontender  Status post bilateral lower extremity amputations, right lower extremity wrapping has been removed, minimal saturation of the dressing  She can move the right stump, reports numbness equivalent to her baseline    Medical Decision Making       51 MINUTES SPENT BY ME on the date of service doing chart review, history, exam, documentation & further activities per the note.      Data   ------------------------- PAST 24 HR DATA REVIEWED -----------------------------------------------    I have personally reviewed the following data over the past 24 hrs:    8.3  \   9.6 (L)   / 179     140 98 21.2 /  144 (H)   3.4 33 (H) 0.54 \       Imaging results reviewed over the past 24 hrs:   No results found for this or any previous visit (from the past 24 hours).

## 2025-02-14 NOTE — PLAN OF CARE
Note from [id number]. Pt rated pain 7-10/10 during shift thus far. No new skin issues noted. Attempting to turn and reposition every 2 hours. Dressing is intact to R AKA. Full sensation per pt. Saline locked. Voiding adequately with the purewick, incontinent. Education on medication administration and use of call-light to reduce risk for falls and injury. Heparin drip discontinued. Alarms in place. No further issues noted. VSS, denies shortness of breath.    Meghana Flores RN

## 2025-02-14 NOTE — PROGRESS NOTES
VASCULAR SURGERY PROGRESS NOTE    Subjective:  Patient was seen and evaluated at the bedside for surgical follow up. Notes not getting any sleep overnight due to frequent interruptions. Pain is controlled. Off supplemental O2. Planning to discharge back LTC.     Objective:  Intake/Output Summary (Last 24 hours) at 2/14/2025 0743  Last data filed at 2/14/2025 0300  Gross per 24 hour   Intake 90 ml   Output 2350 ml   Net -2260 ml     PHYSICAL EXAM:  BP (!) 141/67 (BP Location: Left arm, Cuff Size: Adult Regular)   Pulse 80   Temp 97.4  F (36.3  C) (Oral)   Resp 17   Wt 81.8 kg (180 lb 5.4 oz)   SpO2 98%   BMI 29.11 kg/m    General: The patient is alert and oriented. Appropriate. No acute distress  Psych: pleasant affect, answers questions appropriately  Skin: color appropriate for race, warm, dry  Respiratory: normal respiratory effort   Extremities: clean and dry dressing intact over AKA incision, mild sero-sanguinous output when LEILA drain was removed        Imaging:   Pertinent imaging reviewed    ASSESSMENT:  79-year-old female with past medical history as noted above who had previous evidence of osteomyelitis and nonsalvageable right foot resulting in right guillotine below-knee amputation for source control on 11/14/24.  Postoperatively she developed continued lymphedema of her right lower extremity with right posterior leg ulceration and chronic superficial wounds.  Due to the degree of edema her completion amputation was delayed to provide the highest chance of adequate wound healing without risk of dehiscence and failure secondary to severe edema.      Options were discussed and ultimately decision was made to proceed with above knee amputation. Now POD #1      PLAN:  Dressing change to AKA every other day and PRN if soiled or if patient voids onto dressings   Continue PT/OT   Multimodal pain control  Will discontinue heparin gtt and start therapeutic lovenox today. Transition back to PTA eliquis at time  of discharge  Medicine following, will discuss transfer to Medicine now that dressings have been removed, drain is out, and patient does not require any further surgical intervention  Follow up with vascular surgery as previously scheduled on 3/10      Bettye Felipe PA-C  VASCULAR SURGERY

## 2025-02-15 LAB
ANION GAP SERPL CALCULATED.3IONS-SCNC: 10 MMOL/L (ref 7–15)
BACTERIA UR CULT: NORMAL
BUN SERPL-MCNC: 20.3 MG/DL (ref 8–23)
CALCIUM SERPL-MCNC: 9.4 MG/DL (ref 8.8–10.4)
CHLORIDE SERPL-SCNC: 95 MMOL/L (ref 98–107)
CREAT SERPL-MCNC: 0.56 MG/DL (ref 0.51–0.95)
EGFRCR SERPLBLD CKD-EPI 2021: >90 ML/MIN/1.73M2
GLUCOSE BLDC GLUCOMTR-MCNC: 129 MG/DL (ref 70–99)
GLUCOSE BLDC GLUCOMTR-MCNC: 146 MG/DL (ref 70–99)
GLUCOSE BLDC GLUCOMTR-MCNC: 168 MG/DL (ref 70–99)
GLUCOSE BLDC GLUCOMTR-MCNC: 176 MG/DL (ref 70–99)
GLUCOSE SERPL-MCNC: 104 MG/DL (ref 70–99)
HCO3 SERPL-SCNC: 32 MMOL/L (ref 22–29)
HOLD SPECIMEN: NORMAL
POTASSIUM SERPL-SCNC: 3.9 MMOL/L (ref 3.4–5.3)
SODIUM SERPL-SCNC: 137 MMOL/L (ref 135–145)
VIT D+METAB SERPL-MCNC: 22 NG/ML (ref 20–50)

## 2025-02-15 PROCEDURE — 250N000013 HC RX MED GY IP 250 OP 250 PS 637: Performed by: PAIN MEDICINE

## 2025-02-15 PROCEDURE — 250N000013 HC RX MED GY IP 250 OP 250 PS 637: Performed by: HOSPITALIST

## 2025-02-15 PROCEDURE — 99232 SBSQ HOSP IP/OBS MODERATE 35: CPT | Performed by: HOSPITALIST

## 2025-02-15 PROCEDURE — 36415 COLL VENOUS BLD VENIPUNCTURE: CPT | Performed by: HOSPITALIST

## 2025-02-15 PROCEDURE — 250N000013 HC RX MED GY IP 250 OP 250 PS 637: Performed by: STUDENT IN AN ORGANIZED HEALTH CARE EDUCATION/TRAINING PROGRAM

## 2025-02-15 PROCEDURE — 80048 BASIC METABOLIC PNL TOTAL CA: CPT | Performed by: HOSPITALIST

## 2025-02-15 PROCEDURE — 120N000001 HC R&B MED SURG/OB

## 2025-02-15 PROCEDURE — 82565 ASSAY OF CREATININE: CPT | Performed by: HOSPITALIST

## 2025-02-15 RX ORDER — VITAMIN B COMPLEX
50 TABLET ORAL DAILY
Status: DISCONTINUED | OUTPATIENT
Start: 2025-02-15 | End: 2025-02-17 | Stop reason: HOSPADM

## 2025-02-15 RX ORDER — DOCUSATE SODIUM 100 MG/1
100 CAPSULE, LIQUID FILLED ORAL 2 TIMES DAILY
Status: DISCONTINUED | OUTPATIENT
Start: 2025-02-15 | End: 2025-02-17 | Stop reason: HOSPADM

## 2025-02-15 RX ADMIN — METHADONE HYDROCHLORIDE 5 MG: 5 TABLET ORAL at 14:07

## 2025-02-15 RX ADMIN — MICONAZOLE NITRATE: 20 CREAM TOPICAL at 21:02

## 2025-02-15 RX ADMIN — METHOCARBAMOL 250 MG: 500 TABLET ORAL at 19:18

## 2025-02-15 RX ADMIN — HYDROMORPHONE HYDROCHLORIDE 2 MG: 2 TABLET ORAL at 17:49

## 2025-02-15 RX ADMIN — APIXABAN 5 MG: 5 TABLET, FILM COATED ORAL at 21:02

## 2025-02-15 RX ADMIN — METHADONE HYDROCHLORIDE 5 MG: 5 TABLET ORAL at 21:55

## 2025-02-15 RX ADMIN — DOCUSATE SODIUM 100 MG: 100 CAPSULE, LIQUID FILLED ORAL at 10:17

## 2025-02-15 RX ADMIN — Medication 50 MCG: at 14:41

## 2025-02-15 RX ADMIN — LIDOCAINE: 50 OINTMENT TOPICAL at 20:31

## 2025-02-15 RX ADMIN — TORSEMIDE 60 MG: 20 TABLET ORAL at 09:41

## 2025-02-15 RX ADMIN — HYDROMORPHONE HYDROCHLORIDE 2 MG: 2 TABLET ORAL at 21:02

## 2025-02-15 RX ADMIN — ACETAMINOPHEN 975 MG: 325 TABLET ORAL at 09:41

## 2025-02-15 RX ADMIN — METHADONE HYDROCHLORIDE 5 MG: 5 TABLET ORAL at 06:12

## 2025-02-15 RX ADMIN — ZINC SULFATE 220 MG (50 MG) CAPSULE 220 MG: CAPSULE at 09:42

## 2025-02-15 RX ADMIN — ATORVASTATIN CALCIUM 40 MG: 40 TABLET, FILM COATED ORAL at 21:02

## 2025-02-15 RX ADMIN — APIXABAN 5 MG: 5 TABLET, FILM COATED ORAL at 09:42

## 2025-02-15 ASSESSMENT — ACTIVITIES OF DAILY LIVING (ADL)
ADLS_ACUITY_SCORE: 62
ADLS_ACUITY_SCORE: 66
ADLS_ACUITY_SCORE: 62
ADLS_ACUITY_SCORE: 64
ADLS_ACUITY_SCORE: 66
ADLS_ACUITY_SCORE: 62
ADLS_ACUITY_SCORE: 62
ADLS_ACUITY_SCORE: 64
ADLS_ACUITY_SCORE: 62
ADLS_ACUITY_SCORE: 64
ADLS_ACUITY_SCORE: 62
ADLS_ACUITY_SCORE: 66
ADLS_ACUITY_SCORE: 64
ADLS_ACUITY_SCORE: 62
ADLS_ACUITY_SCORE: 66
ADLS_ACUITY_SCORE: 62
ADLS_ACUITY_SCORE: 66
ADLS_ACUITY_SCORE: 62
ADLS_ACUITY_SCORE: 62

## 2025-02-15 NOTE — PROGRESS NOTES
[facility]    Medicine Progress Note - Hospitalist Service    Date of Admission:  2/11/2025    Assessment & Plan   Identification: Linda Loredo is a 79 year old female retired   PMHx: CKD 3, chronic pain (neuropathy), insulin-dependent type 2 diabetes, paroxysmal atrial fibrillation, right-sided heart failure, PAD, BERLIN, osteomyelitis    On February 11, she was admitted under vascular surgery. 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  After the BKA, she developed lymphedema and chronic wounds.  She therefore underwent right above-knee amputation with 100 mL EBL with LEILA drain.  [facility] was consulted for medical comanagement.  She struggled with pain control postoperatively, and pain team was consulted to assist.  She was fairly somnolent postop day 1 and 2, likely due to pain medication titration.  UA was obtained due to somnolence, with some mild abnormality and pyuria ultimately growing mixed urogenital danny.  Wound culture grew 1+ Corynebacterium striated.  Magnesium and vitamin D were added given her history of neuropathy.     Today:  Stable clinically, still hasn't had BM, alkalosis stable, weight trend up slightly  Resumed on DOAC (apixaban)  -colace ordered, I recommended enema, but she declined  -Vitamin D supplementation initiated  *discussed with her daughter by phone    Constipation: Needs to have a bowel movement, enema if Colace and magnesium ineffective    Normocytic anemia: Baseline hemoglobin is around 11, continue clinical monitoring    PAD S/P Right AKA  -Analgesics, antiemetics, DVT prophylaxis, and therapies per surgical team   -PTA atorvastatin 40 mg daily    Paroxysmal A-fib  -PTA apixaban  -Is not on any rate controlling therapy or antiarrhythmics  -Monitor for any signs of tachycardia    Insulin-dependent type 2 diabetes with diabetic neuropathy: A1c 7.2%  -Tresiba 30 units daily  -High  "intensity sliding scale insulin  -Magnesium oxide 400 mg every 48    HFpEF  Metabolic alkalosis  -PTA torsemide 60 mg daily    Chronic pain  -Agree with pain team consult, pain otherwise per primary team    CKD stage III  -Avoid nephrotoxins trend    Hx of BERLIN: Poor tolerance of CPAP          Diet: Moderate Consistent Carb (60 g CHO per Meal) Diet    DVT Prophylaxis: Defer to primary service  Braga Catheter: Not present  Lines: None     Cardiac Monitoring: None  Code Status: Full Code      Clinically Significant Risk Factors          # Hypochloremia: Lowest Cl = 95 mmol/L in last 2 days, will monitor as appropriate          # Hypertension: Noted on problem list           # DMII: A1C = 7.2 % (Ref range: <5.7 %) within past 6 months       # Financial/Environmental Concerns: none         Social Drivers of Health     Received from [employer]    Social Connections          Disposition Plan     Medically Ready for Discharge: Anticipated Tomorrow         Miguel Carter MD  Hospitalist Service  [facility]  Securely message with [employer] (more info)  Text page via [employer] Paging/Directory   ______________________________________________________________________    Interval History   No bowel movement, last 1 was on Monday  Does not want to take senna because she had \"11 bowel movements\" last time she took senna  Not interested in enema    Physical Exam   Vital Signs: Temp: 97.5  F (36.4  C) Temp src: Oral BP: 126/59 Pulse: 97   Resp: 18 SpO2: 94 % O2 Device: None (Room air)    Weight: 184 lbs 8.4 oz    Clinically unchanged from yesterday, awake, alert, appropriate conversation  She is able to crack a smile at times  Pupils symmetric  Poor dentition  Regular rate and rhythm  Lungs clear bilaterally, breathing comfortably on room air  Abdominal distention that is nonacute, fairly soft, nontender  Bilateral amputations  A little " more soaking in the dressing today on right stump, mild erythema of the posterior portion of the stump    Medical Decision Making       42 MINUTES SPENT BY ME on the date of service doing chart review, history, exam, documentation & further activities per the note.      Data   ------------------------- PAST 24 HR DATA REVIEWED -----------------------------------------------    I have personally reviewed the following data over the past 24 hrs:    N/A  \   N/A   / N/A     137 95 (L) 20.3 /  129 (H)   3.9 32 (H) 0.56 \       Imaging results reviewed over the past 24 hrs:   No results found for this or any previous visit (from the past 24 hours).

## 2025-02-15 NOTE — PLAN OF CARE
Problem: Adult Inpatient Plan of Care  Goal: Absence of Hospital-Acquired Illness or Injury  Intervention: Identify and Manage Fall Risk  Problem: Comorbidity Management  Goal: Blood Pressure in Desired Range  Intervention: Maintain Blood Pressure Management   Problem: Pain Acute  Goal: Optimal Pain Control and Function  Intervention: Prevent or Manage Pain    Goal Outcome Evaluation:  Patient vital signs are at baseline: Yes  Patient able to ambulate as they were prior to admission or with assist devices provided by therapies during their stay:  No,  Reason: Patient initially refused skin assessment but she was later complaint but she refused repositioning even after providing education.   Patient MUST void prior to discharge:  Yes. Utilized pure wick  Patient able to tolerate oral intake:  Yes  Pain has adequate pain control using Oral analgesics:  Yes  Does patient have an identified :  Yes  Has goal D/C date and time been discussed with patient:  Yes

## 2025-02-15 NOTE — PLAN OF CARE
Goal Outcome Evaluation:      Problem: Skin Injury Risk Increased  Goal: Skin Health and Integrity  Outcome: Not Progressing  Intervention: Plan: Nurse Driven Intervention: Moisture Management  Recent Flowsheet Documentation  Taken 2/15/2025 0945 by Jessica Kerr, RN  Moisture Interventions: Urinary collection device  Intervention: Plan: Nurse Driven Intervention: Friction and Shear  Recent Flowsheet Documentation  Taken 2/15/2025 0945 by Jessica Kerr, RN  Friction/Shear Interventions: HOB 30 degrees or less     Patient refused turns, wound care and brief checks/changes. Colace given, no bm.

## 2025-02-15 NOTE — PROGRESS NOTES
Vascular surgery progress note    Doing well.  No acute pain.    On exam,  NAD  Warm and well-perfused  Breathing well on room air  Right AKA stump and Mepilex dressing with no signs of infection, soft touch no hematoma    Impression: Postop day 4 from right above-knee amputation, doing well.    I started Eliquis last night.  Continue.  Pain control.  Discharge pending Monday.    Discussed this with the patient and daughter    Elle Eli MD  Vascular Surgery Fellow

## 2025-02-15 NOTE — PLAN OF CARE
Problem: Adult Inpatient Plan of Care  Goal: Absence of Hospital-Acquired Illness or Injury  Intervention: Prevent Skin Injury  Recent Flowsheet Documentation  Taken 2/14/2025 2155 by Stephanie Martinez RN  Body Position: refuses positioning  Taken 2/14/2025 1955 by Stephanie Martinez RN  Body Position: refuses positioning  Taken 2/14/2025 1755 by Stephanie Martinez RN  Body Position: refuses positioning  Taken 2/14/2025 1555 by Stephanie Martinez RN  Body Position: refuses positioning  Skin Protection:   tubing/devices free from skin contact   adhesive use limited   incontinence pads utilized     Problem: Skin Injury Risk Increased  Goal: Skin Health and Integrity  Outcome: Not Progressing  Intervention: Plan: Nurse Driven Intervention: Positioning  Recent Flowsheet Documentation  Taken 2/14/2025 1555 by Stephanie Martinez RN  Plan: Positioning Interventions: HOB 30 degrees or less  Intervention: Plan: Nurse Driven Intervention: Moisture Management  Recent Flowsheet Documentation  Taken 2/14/2025 2155 by Stephanie Martinez RN  Bathing/Skin Care: (pt refused all cares offerred this shift.) patient refused  Taken 2/14/2025 1555 by Stephanie Martinez RN  Moisture Interventions:   Urinary collection device   Incontinence pad  Intervention: Plan: Nurse Driven Intervention: Friction and Shear  Recent Flowsheet Documentation  Taken 2/14/2025 1555 by Stephanie Martinez RN  Friction/Shear Interventions:   HOB 30 degrees or less   Silicone foam sacral dressing  Intervention: Optimize Skin Protection  Recent Flowsheet Documentation  Taken 2/14/2025 1555 by Stephanie Martinez RN  Pressure Reduction Techniques: frequent weight shift encouraged  Pressure Reduction Devices: foam padding utilized  Skin Protection:   tubing/devices free from skin contact   adhesive use limited   incontinence pads utilized  Activity Management: activity encouraged     Pt refused all repositioning in bed, despite education  "frequently throughout shift. Pt declined all offers of sreekanth care, oral cares, general hygiene despite frequent offers during shift. Pt sitting upright in bed utilizing purewick declines use of keisha pad but utilizing open brief that is slightly moistened with incontinent urine, pt declines the pad and purewick to be changed, pt stated \"if the bed isn't soaking wet I'm not changing out my pad.\"    Patient vital signs are at baseline: Yes  Patient able to ambulate as they were prior to admission or with assist devices provided by therapies during their stay:  No,  Reason:  Pt declined to be repositioned in bed, pt declined to be OOB or dangle at EOB w/ assist despite frequent education.  Patient MUST void prior to discharge:  Yes  Patient able to tolerate oral intake:  Yes  Pain has adequate pain control using Oral analgesics:  No,  Reason:  Gave IV dilaudid x 1 for 10/10 pain for severe breakthrough pain.  Pt declined PRN methocarbamol despite education pt states \"that doesn't help my pain at all.\"   Does patient have an identified :  Yes  Has goal D/C date and time been discussed with patient:  Yes        "

## 2025-02-16 ENCOUNTER — APPOINTMENT (OUTPATIENT)
Dept: PHYSICAL THERAPY | Facility: CLINIC | Age: 80
DRG: 240 | End: 2025-02-16
Attending: STUDENT IN AN ORGANIZED HEALTH CARE EDUCATION/TRAINING PROGRAM
Payer: COMMERCIAL

## 2025-02-16 LAB
ALBUMIN SERPL BCG-MCNC: 3.4 G/DL (ref 3.5–5.2)
ALP SERPL-CCNC: 92 U/L (ref 40–150)
ALT SERPL W P-5'-P-CCNC: 5 U/L (ref 0–50)
ANION GAP SERPL CALCULATED.3IONS-SCNC: 11 MMOL/L (ref 7–15)
AST SERPL W P-5'-P-CCNC: 25 U/L (ref 0–45)
BACTERIA TISS BX CULT: ABNORMAL
BILIRUB SERPL-MCNC: 0.4 MG/DL
BUN SERPL-MCNC: 24.3 MG/DL (ref 8–23)
CALCIUM SERPL-MCNC: 10.1 MG/DL (ref 8.8–10.4)
CHLORIDE SERPL-SCNC: 94 MMOL/L (ref 98–107)
CREAT SERPL-MCNC: 0.55 MG/DL (ref 0.51–0.95)
CRP SERPL-MCNC: 28.1 MG/L
EGFRCR SERPLBLD CKD-EPI 2021: >90 ML/MIN/1.73M2
ERYTHROCYTE [DISTWIDTH] IN BLOOD BY AUTOMATED COUNT: 16.7 % (ref 10–15)
GLUCOSE BLDC GLUCOMTR-MCNC: 119 MG/DL (ref 70–99)
GLUCOSE BLDC GLUCOMTR-MCNC: 131 MG/DL (ref 70–99)
GLUCOSE BLDC GLUCOMTR-MCNC: 158 MG/DL (ref 70–99)
GLUCOSE BLDC GLUCOMTR-MCNC: 160 MG/DL (ref 70–99)
GLUCOSE SERPL-MCNC: 177 MG/DL (ref 70–99)
HCO3 SERPL-SCNC: 30 MMOL/L (ref 22–29)
HCT VFR BLD AUTO: 32.7 % (ref 35–47)
HGB BLD-MCNC: 10.8 G/DL (ref 11.7–15.7)
LIPASE SERPL-CCNC: 12 U/L (ref 13–60)
MCH RBC QN AUTO: 29 PG (ref 26.5–33)
MCHC RBC AUTO-ENTMCNC: 33 G/DL (ref 31.5–36.5)
MCV RBC AUTO: 88 FL (ref 78–100)
PLATELET # BLD AUTO: 232 10E3/UL (ref 150–450)
POTASSIUM SERPL-SCNC: 3.4 MMOL/L (ref 3.4–5.3)
PROT SERPL-MCNC: 7.9 G/DL (ref 6.4–8.3)
RBC # BLD AUTO: 3.72 10E6/UL (ref 3.8–5.2)
SODIUM SERPL-SCNC: 135 MMOL/L (ref 135–145)
WBC # BLD AUTO: 13.4 10E3/UL (ref 4–11)

## 2025-02-16 PROCEDURE — 250N000013 HC RX MED GY IP 250 OP 250 PS 637: Performed by: STUDENT IN AN ORGANIZED HEALTH CARE EDUCATION/TRAINING PROGRAM

## 2025-02-16 PROCEDURE — 83690 ASSAY OF LIPASE: CPT | Performed by: HOSPITALIST

## 2025-02-16 PROCEDURE — 84460 ALANINE AMINO (ALT) (SGPT): CPT | Performed by: HOSPITALIST

## 2025-02-16 PROCEDURE — 82310 ASSAY OF CALCIUM: CPT | Performed by: HOSPITALIST

## 2025-02-16 PROCEDURE — 250N000013 HC RX MED GY IP 250 OP 250 PS 637: Performed by: HOSPITALIST

## 2025-02-16 PROCEDURE — 120N000001 HC R&B MED SURG/OB

## 2025-02-16 PROCEDURE — 86140 C-REACTIVE PROTEIN: CPT | Performed by: HOSPITALIST

## 2025-02-16 PROCEDURE — 999N000147 HC STATISTIC PT IP EVAL DEFER

## 2025-02-16 PROCEDURE — 99232 SBSQ HOSP IP/OBS MODERATE 35: CPT | Performed by: HOSPITALIST

## 2025-02-16 PROCEDURE — 82247 BILIRUBIN TOTAL: CPT | Performed by: HOSPITALIST

## 2025-02-16 PROCEDURE — 36415 COLL VENOUS BLD VENIPUNCTURE: CPT | Performed by: HOSPITALIST

## 2025-02-16 PROCEDURE — 85014 HEMATOCRIT: CPT | Performed by: HOSPITALIST

## 2025-02-16 PROCEDURE — 250N000011 HC RX IP 250 OP 636: Performed by: STUDENT IN AN ORGANIZED HEALTH CARE EDUCATION/TRAINING PROGRAM

## 2025-02-16 PROCEDURE — 250N000013 HC RX MED GY IP 250 OP 250 PS 637: Performed by: PAIN MEDICINE

## 2025-02-16 RX ADMIN — MICONAZOLE NITRATE: 2 POWDER TOPICAL at 12:58

## 2025-02-16 RX ADMIN — TORSEMIDE 60 MG: 20 TABLET ORAL at 10:23

## 2025-02-16 RX ADMIN — ZINC SULFATE 220 MG (50 MG) CAPSULE 220 MG: CAPSULE at 10:23

## 2025-02-16 RX ADMIN — ACETAMINOPHEN 975 MG: 325 TABLET ORAL at 18:03

## 2025-02-16 RX ADMIN — HYDROMORPHONE HYDROCHLORIDE 2 MG: 2 TABLET ORAL at 03:59

## 2025-02-16 RX ADMIN — Medication 400 MG: at 14:10

## 2025-02-16 RX ADMIN — ACETAMINOPHEN 975 MG: 325 TABLET ORAL at 00:32

## 2025-02-16 RX ADMIN — Medication 50 MCG: at 10:16

## 2025-02-16 RX ADMIN — APIXABAN 5 MG: 5 TABLET, FILM COATED ORAL at 10:16

## 2025-02-16 RX ADMIN — INSULIN DEGLUDEC 30 UNITS: 100 INJECTION, SOLUTION SUBCUTANEOUS at 10:25

## 2025-02-16 RX ADMIN — HYDROMORPHONE HYDROCHLORIDE 2 MG: 2 TABLET ORAL at 10:16

## 2025-02-16 RX ADMIN — METHADONE HYDROCHLORIDE 5 MG: 5 TABLET ORAL at 14:10

## 2025-02-16 RX ADMIN — METHADONE HYDROCHLORIDE 5 MG: 5 TABLET ORAL at 22:43

## 2025-02-16 RX ADMIN — ONDANSETRON 4 MG: 4 TABLET, ORALLY DISINTEGRATING ORAL at 12:57

## 2025-02-16 RX ADMIN — HYDROMORPHONE HYDROCHLORIDE 2 MG: 2 TABLET ORAL at 00:33

## 2025-02-16 RX ADMIN — ATORVASTATIN CALCIUM 40 MG: 40 TABLET, FILM COATED ORAL at 20:50

## 2025-02-16 RX ADMIN — LIDOCAINE: 50 OINTMENT TOPICAL at 10:29

## 2025-02-16 RX ADMIN — METHOCARBAMOL 250 MG: 500 TABLET ORAL at 04:54

## 2025-02-16 RX ADMIN — METHADONE HYDROCHLORIDE 5 MG: 5 TABLET ORAL at 06:58

## 2025-02-16 RX ADMIN — APIXABAN 5 MG: 5 TABLET, FILM COATED ORAL at 20:50

## 2025-02-16 ASSESSMENT — ACTIVITIES OF DAILY LIVING (ADL)
ADLS_ACUITY_SCORE: 65
ADLS_ACUITY_SCORE: 61
ADLS_ACUITY_SCORE: 70
ADLS_ACUITY_SCORE: 66
ADLS_ACUITY_SCORE: 63
ADLS_ACUITY_SCORE: 61
ADLS_ACUITY_SCORE: 61
ADLS_ACUITY_SCORE: 69
ADLS_ACUITY_SCORE: 69
ADLS_ACUITY_SCORE: 63
ADLS_ACUITY_SCORE: 65
ADLS_ACUITY_SCORE: 61
ADLS_ACUITY_SCORE: 63
ADLS_ACUITY_SCORE: 69
ADLS_ACUITY_SCORE: 61
ADLS_ACUITY_SCORE: 65
ADLS_ACUITY_SCORE: 63
ADLS_ACUITY_SCORE: 63
ADLS_ACUITY_SCORE: 69
ADLS_ACUITY_SCORE: 69
ADLS_ACUITY_SCORE: 61

## 2025-02-16 NOTE — PROGRESS NOTES
Pt is not appropriate for skilled PT services at this time due to being already evaluated by PT on 2-12-25.    Plan was discussed with nursing and patient.  Will D/C current PT orders.  Please reorder if status changes.   Thank you.    2/16/2025 by Eleuterio Monte, PT

## 2025-02-16 NOTE — PROGRESS NOTES
Patient vital signs are at baseline: Yes  Patient able to ambulate as they were prior to admission or with assist devices provided by therapies during their stay:  Yes, wheelchair bound  Patient MUST void prior to discharge:  Yes  Voided  Incontinent, utilizing a purewick  Patient able to tolerate oral intake:  Yes  Pain has adequate pain control using Oral analgesics:  Yes  Does patient have an identified :  Yes  Has goal D/C date and time been discussed with patient:  Yes

## 2025-02-16 NOTE — PLAN OF CARE
Problem: Adult Inpatient Plan of Care  Goal: Absence of Hospital-Acquired Illness or Injury  Intervention: Identify and Manage Fall Risk  Problem: Pain Acute  Goal: Optimal Pain Control and Function  Intervention: Prevent or Manage Pain   Problem: Comorbidity Management  Goal: Blood Pressure in Desired Range  Intervention: Maintain Blood Pressure Management    Goal Outcome Evaluation:  Patient is alert and oriented X 4. C/O 10/10 right stump pain. Scheduled Tylenol, PRN oral Dilaudid and PRN Robaxin administered for pain control. Loose stools X 3 this shift. Utilized pure wick. IV saline locked. VSS on RA. Bed alarms activated for safety.

## 2025-02-16 NOTE — PLAN OF CARE
Goal Outcome Evaluation:      Problem: Adult Inpatient Plan of Care  Goal: Plan of Care Review  Description: The Plan of Care Review/Shift note should be completed every shift.  The Outcome Evaluation is a brief statement about your assessment that the patient is improving, declining, or no change.  This information will be displayed automatically on your shift  note.  Outcome: Not Progressing       Loose stools in AM. Patient transferred to wheelchair, SBA. PRN PO dilaudid given for pain. PRN zofran given for nausea, effective. Refused to go down for CT due to not feeling well enough to transfer from chair to bed.

## 2025-02-16 NOTE — PROGRESS NOTES
Vascular surgery progress note    Doing well.  No acute pain.    On exam,  NAD  Warm and well-perfused  Breathing well on room air  Right AKA stump and Mepilex dressing with no signs of infection, soft touch no hematoma, drain site dressing changed    Impression: Postop day 5 from right above-knee amputation, doing well.    Continue AC  Pain control.  Discharge pending Monday.    Elle Eli MD  Vascular Surgery Fellow

## 2025-02-16 NOTE — PROGRESS NOTES
[facility]    Medicine Progress Note - Hospitalist Service    Date of Admission:  2/11/2025    Assessment & Plan   Identification: Linda Loredo is a 79 year old female retired   PMHx: CKD 3, chronic pain (neuropathy), insulin-dependent type 2 diabetes, paroxysmal atrial fibrillation, right-sided heart failure, PAD, BERLIN, osteomyelitis    On February 11, she was admitted under vascular surgery. 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  After the BKA, she developed lymphedema and chronic wounds.  She therefore underwent right above-knee amputation with 100 mL EBL with LEILA drain.  [facility] [place] [facility].  She struggled with pain control postoperatively, and pain team was consulted to assist.  She was fairly somnolent postop day 1 and 2, likely due to pain medication titration.  UA was obtained due to somnolence, with some mild abnormality and pyuria ultimately growing mixed urogenital danny.  Wound culture grew 1+ Corynebacterium striated.  Magnesium and vitamin D were added given her history of neuropathy.  On February 16 she felt unwell and had retching.  CT of the abdomen was ordered.    Today:  Retching into a trash can, unwell appearing, developed loose stools.  Glycemic control acceptable  -Check CMP and lipase  -Check CBC, CRP  -Check CT abdomen pelvis  *Discussed with charge nurse that she needs antiemetics    Vomiting: May have picked up viral illness while inpatient.  May have overflow incontinence and still be constipated.  May have acute abdominal process.    Normocytic anemia: Baseline hemoglobin is around 11, continue clinical monitoring    PAD S/P Right AKA  -Analgesics, antiemetics, DVT prophylaxis, and therapies per surgical team   -PTA atorvastatin 40 mg daily    Paroxysmal A-fib: Not on rate controllers or rhythm control. 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  Sees [facility].  Chronically on methadone  -Methadone 5 mg 3 times daily  -As needed Dilaudid IV and p.o.    CKD stage III: Avoid nephrotoxins trend    Hx of BERLIN: Poor tolerance of CPAP          Diet: Moderate Consistent Carb (60 g CHO per Meal) Diet    DVT Prophylaxis: DOAC and Defer to primary service  Braga Catheter: Not present  Lines: None     Cardiac Monitoring: None  Code Status: Full Code      Clinically Significant Risk Factors          # Hypochloremia: Lowest Cl = 95 mmol/L in last 2 days, will monitor as appropriate          # Hypertension: Noted on problem list           # DMII: A1C = 7.2 % (Ref range: <5.7 %) within past 6 months       # Financial/Environmental Concerns: none         Social Drivers of Health     [facility] [employer] [employer]    Social Connections          Disposition Plan     Medically Ready for Discharge: Anticipated Tomorrow         Miguel Carter MD  Hospitalist Service  [facility]  Securely message with [employer] (more info)  Text page via [employer] Paging/Directory   ______________________________________________________________________    Interval History   \" I do not feel good\"  Says she had 3 bowel movements, watery, loose  Weight is stable  Afebrile, mildly tachycardic this morning, mildly hypertensive, stable on room air  Has not used IV Dilaudid for about 48 hours    Physical Exam   Vital Signs: Temp: 97.5  F (36.4  C) Temp src: Oral BP: (!) 141/83 Pulse: 105   Resp: 18 SpO2: 98 % O2 Device: None (Room air)    Weight: 177 lbs " 11.05 oz    Sitting in wheelchair retching into a trash can  Somewhat ill-appearing, nondiaphoretic  Awake, alert, appropriate conversation  Poor dentition  Regular rate in the 90s  Lungs clear to auscultation  Abdomen obese, soft, no focal tenderness, no rebound tenderness, nonacute  Postop right AKA, left BKA    Medical Decision Making             Data

## 2025-02-17 ENCOUNTER — APPOINTMENT (OUTPATIENT)
Dept: OCCUPATIONAL THERAPY | Facility: CLINIC | Age: 80
DRG: 240 | End: 2025-02-17
Attending: STUDENT IN AN ORGANIZED HEALTH CARE EDUCATION/TRAINING PROGRAM
Payer: COMMERCIAL

## 2025-02-17 VITALS
DIASTOLIC BLOOD PRESSURE: 74 MMHG | OXYGEN SATURATION: 96 % | BODY MASS INDEX: 28.89 KG/M2 | TEMPERATURE: 97.3 F | HEART RATE: 95 BPM | WEIGHT: 179.01 LBS | RESPIRATION RATE: 16 BRPM | SYSTOLIC BLOOD PRESSURE: 125 MMHG

## 2025-02-17 LAB
ERYTHROCYTE [DISTWIDTH] IN BLOOD BY AUTOMATED COUNT: 16.7 % (ref 10–15)
GLUCOSE BLDC GLUCOMTR-MCNC: 149 MG/DL (ref 70–99)
GLUCOSE BLDC GLUCOMTR-MCNC: 150 MG/DL (ref 70–99)
HCT VFR BLD AUTO: 29.6 % (ref 35–47)
HGB BLD-MCNC: 9.5 G/DL (ref 11.7–15.7)
HOLD SPECIMEN: NORMAL
MCH RBC QN AUTO: 28.9 PG (ref 26.5–33)
MCHC RBC AUTO-ENTMCNC: 32.1 G/DL (ref 31.5–36.5)
MCV RBC AUTO: 90 FL (ref 78–100)
PLATELET # BLD AUTO: 207 10E3/UL (ref 150–450)
PLATELET # BLD AUTO: 208 10E3/UL (ref 150–450)
RBC # BLD AUTO: 3.29 10E6/UL (ref 3.8–5.2)
WBC # BLD AUTO: 13 10E3/UL (ref 4–11)

## 2025-02-17 PROCEDURE — 250N000013 HC RX MED GY IP 250 OP 250 PS 637: Performed by: PAIN MEDICINE

## 2025-02-17 PROCEDURE — 250N000013 HC RX MED GY IP 250 OP 250 PS 637: Performed by: STUDENT IN AN ORGANIZED HEALTH CARE EDUCATION/TRAINING PROGRAM

## 2025-02-17 PROCEDURE — 85049 AUTOMATED PLATELET COUNT: CPT | Performed by: STUDENT IN AN ORGANIZED HEALTH CARE EDUCATION/TRAINING PROGRAM

## 2025-02-17 PROCEDURE — 85027 COMPLETE CBC AUTOMATED: CPT | Performed by: STUDENT IN AN ORGANIZED HEALTH CARE EDUCATION/TRAINING PROGRAM

## 2025-02-17 PROCEDURE — 99232 SBSQ HOSP IP/OBS MODERATE 35: CPT | Performed by: NURSE PRACTITIONER

## 2025-02-17 PROCEDURE — 97535 SELF CARE MNGMENT TRAINING: CPT | Mod: GO

## 2025-02-17 PROCEDURE — 250N000013 HC RX MED GY IP 250 OP 250 PS 637: Performed by: HOSPITALIST

## 2025-02-17 PROCEDURE — 36415 COLL VENOUS BLD VENIPUNCTURE: CPT | Performed by: STUDENT IN AN ORGANIZED HEALTH CARE EDUCATION/TRAINING PROGRAM

## 2025-02-17 RX ORDER — BISACODYL 10 MG
10 SUPPOSITORY, RECTAL RECTAL DAILY PRN
Qty: 30 SUPPOSITORY | Refills: 0 | Status: SHIPPED | OUTPATIENT
Start: 2025-02-17 | End: 2025-02-17

## 2025-02-17 RX ORDER — MAGNESIUM OXIDE 400 MG/1
400 TABLET ORAL
Qty: 15 TABLET | Refills: 0 | Status: SHIPPED | OUTPATIENT
Start: 2025-02-18

## 2025-02-17 RX ORDER — METHOCARBAMOL 500 MG/1
250 TABLET, FILM COATED ORAL EVERY 6 HOURS PRN
Qty: 20 TABLET | Refills: 0 | Status: SHIPPED | OUTPATIENT
Start: 2025-02-17 | End: 2025-02-27

## 2025-02-17 RX ORDER — ONDANSETRON 4 MG/1
4 TABLET, ORALLY DISINTEGRATING ORAL EVERY 6 HOURS PRN
Qty: 30 TABLET | Refills: 0 | Status: SHIPPED | OUTPATIENT
Start: 2025-02-17

## 2025-02-17 RX ORDER — DOCUSATE SODIUM 100 MG/1
100 CAPSULE, LIQUID FILLED ORAL 2 TIMES DAILY
Qty: 30 CAPSULE | Refills: 0 | Status: SHIPPED | OUTPATIENT
Start: 2025-02-17 | End: 2025-02-17

## 2025-02-17 RX ORDER — BISACODYL 10 MG
10 SUPPOSITORY, RECTAL RECTAL DAILY PRN
Qty: 30 SUPPOSITORY | Refills: 0 | Status: SHIPPED | OUTPATIENT
Start: 2025-02-17

## 2025-02-17 RX ORDER — MAGNESIUM OXIDE 400 MG/1
400 TABLET ORAL
Qty: 15 TABLET | Refills: 0 | Status: SHIPPED | OUTPATIENT
Start: 2025-02-18 | End: 2025-02-17

## 2025-02-17 RX ORDER — VITAMIN B COMPLEX
50 TABLET ORAL DAILY
Qty: 60 TABLET | Refills: 0 | Status: SHIPPED | OUTPATIENT
Start: 2025-02-17 | End: 2025-03-19

## 2025-02-17 RX ORDER — DOCUSATE SODIUM 100 MG/1
100 CAPSULE, LIQUID FILLED ORAL 2 TIMES DAILY
Qty: 30 CAPSULE | Refills: 0 | Status: SHIPPED | OUTPATIENT
Start: 2025-02-17

## 2025-02-17 RX ORDER — PROCHLORPERAZINE MALEATE 5 MG/1
5 TABLET ORAL EVERY 6 HOURS PRN
Qty: 20 TABLET | Refills: 0 | Status: SHIPPED | OUTPATIENT
Start: 2025-02-17

## 2025-02-17 RX ORDER — ONDANSETRON 4 MG/1
4 TABLET, ORALLY DISINTEGRATING ORAL EVERY 6 HOURS PRN
Qty: 30 TABLET | Refills: 0 | Status: SHIPPED | OUTPATIENT
Start: 2025-02-17 | End: 2025-02-17

## 2025-02-17 RX ORDER — ACETAMINOPHEN 325 MG/1
975 TABLET ORAL EVERY 8 HOURS
Qty: 90 TABLET | Refills: 0 | Status: SHIPPED | OUTPATIENT
Start: 2025-02-17 | End: 2025-02-27

## 2025-02-17 RX ORDER — HYDROMORPHONE HYDROCHLORIDE 2 MG/1
2 TABLET ORAL
Qty: 10 TABLET | Refills: 0 | Status: SHIPPED | OUTPATIENT
Start: 2025-02-17

## 2025-02-17 RX ORDER — METHADONE HYDROCHLORIDE 5 MG/1
5 TABLET ORAL EVERY 8 HOURS
Qty: 42 TABLET | Refills: 0 | Status: SHIPPED | OUTPATIENT
Start: 2025-02-17 | End: 2025-03-03

## 2025-02-17 RX ADMIN — HYDROMORPHONE HYDROCHLORIDE 2 MG: 2 TABLET ORAL at 13:05

## 2025-02-17 RX ADMIN — HYDROMORPHONE HYDROCHLORIDE 2 MG: 2 TABLET ORAL at 16:17

## 2025-02-17 RX ADMIN — HYDROMORPHONE HYDROCHLORIDE 2 MG: 2 TABLET ORAL at 02:18

## 2025-02-17 RX ADMIN — METHADONE HYDROCHLORIDE 5 MG: 5 TABLET ORAL at 06:53

## 2025-02-17 RX ADMIN — ZINC SULFATE 220 MG (50 MG) CAPSULE 220 MG: CAPSULE at 08:05

## 2025-02-17 RX ADMIN — ACETAMINOPHEN 975 MG: 325 TABLET ORAL at 08:05

## 2025-02-17 RX ADMIN — Medication 50 MCG: at 08:06

## 2025-02-17 RX ADMIN — ACETAMINOPHEN 975 MG: 325 TABLET ORAL at 01:56

## 2025-02-17 RX ADMIN — METHOCARBAMOL 250 MG: 500 TABLET ORAL at 01:56

## 2025-02-17 RX ADMIN — INSULIN DEGLUDEC 30 UNITS: 100 INJECTION, SOLUTION SUBCUTANEOUS at 08:17

## 2025-02-17 RX ADMIN — METHOCARBAMOL 250 MG: 500 TABLET ORAL at 08:05

## 2025-02-17 RX ADMIN — APIXABAN 5 MG: 5 TABLET, FILM COATED ORAL at 08:06

## 2025-02-17 RX ADMIN — ACETAMINOPHEN 975 MG: 325 TABLET ORAL at 16:17

## 2025-02-17 RX ADMIN — METHADONE HYDROCHLORIDE 5 MG: 5 TABLET ORAL at 14:05

## 2025-02-17 RX ADMIN — TORSEMIDE 60 MG: 20 TABLET ORAL at 08:05

## 2025-02-17 RX ADMIN — MICONAZOLE NITRATE: 20 CREAM TOPICAL at 08:22

## 2025-02-17 RX ADMIN — METHOCARBAMOL 250 MG: 500 TABLET ORAL at 17:20

## 2025-02-17 ASSESSMENT — ACTIVITIES OF DAILY LIVING (ADL)
ADLS_ACUITY_SCORE: 74
ADLS_ACUITY_SCORE: 63
ADLS_ACUITY_SCORE: 74
ADLS_ACUITY_SCORE: 74
ADLS_ACUITY_SCORE: 63
ADLS_ACUITY_SCORE: 74
ADLS_ACUITY_SCORE: 63
ADLS_ACUITY_SCORE: 74

## 2025-02-17 NOTE — PLAN OF CARE
Patient WBC 13.0. D/w staff, okay for discharge today with plan to follow up this week with PCP for repeat labs. Plan discussed with patient's daughter as well who will arrange PCP visit for repeat labs this week vs labs at [facility].       Hilda Aceves MD  [id number]  Vascular Surgery  Pager: [phone]    Please page me directly with any questions/concerns during regular weekday hours before 5PM. If there is no response, if it is a weekend, or if it is during evening hours, then please use the Overwatch system to page the first-call resident on call for vascular surgery.

## 2025-02-17 NOTE — DISCHARGE SUMMARY
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   ***         Procedures:   ***  Procedure(s):  RIGHT ABOVE KNEE AMPUTATION          Consultations:   OCCUPATIONAL THERAPY ADULT IP CONSULT  PHYSICAL THERAPY ADULT IP CONSULT  HOSPITALIST IP CONSULT  PAIN MANAGEMENT ADULT IP CONSULT  CARE MANAGEMENT / SOCIAL WORK IP CONSULT  CARE MANAGEMENT / SOCIAL WORK IP CONSULT  WOUND OSTOMY CONTINENCE NURSE  IP CONSULT  CARE MANAGEMENT / SOCIAL WORK IP CONSULT  SPIRITUAL HEALTH SERVICES IP CONSULT  PHARMACY IP CONSULT  PHYSICAL THERAPY ADULT IP CONSULT  PHYSICAL THERAPY ADULT IP CONSULT  OCCUPATIONAL THERAPY ADULT IP CONSULT          Brief History of Illness:   ***           Hospital Course:   ***    The patient underwent *** on ***, which was she tolerated well without immediate complications. She was transferred to the PACU and then floor for routine postoperative care. The remainder of her postoperative course was unremarkable. Braga was removed on POD#***. She had return of bowel function on POD#*** and her diet was slowly advanced. On the day of discharge, she was tolerating ***regular diet, her pain was well controlled with oral pain medications, she was voiding spontaneously, and ambulating independently. Patient with follow up with *** in *** clinic in  "*** weeks.           Imaging Studies:     Results for orders placed or performed during the hospital encounter of 02/11/25   POC US Guidance Needle Placement    Narrative    Ultrasound was performed as guidance to an anesthesia procedure.  Click   \"PACS images\" hyperlink below to view any stored images.  For specific   procedure details, view procedure note authored by anesthesia.   POC US Guidance Needle Placement    Narrative    Ultrasound was performed as guidance to an anesthesia procedure.  Click   \"PACS images\" hyperlink below to view any stored images.  For specific   procedure details, view procedure note authored by anesthesia.              Medications Prior to Admission:     Facility-Administered Medications Prior to Admission   Medication Dose Route Frequency Provider Last Rate Last Admin    [DISCONTINUED] lidocaine (XYLOCAINE) 5 % ointment   Topical Daily PRN Rosario Rodgers APRN CNP   Given at 01/02/25 1240     Medications Prior to Admission   Medication Sig Dispense Refill Last Dose/Taking    acetaminophen (TYLENOL) 325 MG tablet Take 650 mg by mouth every 4 hours as needed for mild pain.   Unknown    atorvastatin (LIPITOR) 40 MG tablet Take 1 tablet (40 mg) by mouth every evening   2/10/2025 at  8:00 PM    calcium carbonate (TUMS) 500 MG chewable tablet Take 1 tablet (500 mg) by mouth 4 times daily as needed for heartburn. 90 tablet 0 Unknown    HYDROmorphone (DILAUDID) 2 MG tablet Take 2 tablets (4 mg) by mouth every 4 hours as needed for breakthrough pain (please give 1 hour before dressing change every 5 days). 30 tablet 0 2/7/2025    Insulin Aspart FlexPen 100 UNIT/ML SOPN Inject 5 Units subcutaneously 3 times daily (before meals).   2/10/2025 Evening    insulin degludec (TRESIBA) 200 UNIT/ML pen Inject 30 Units subcutaneously every morning. Hold for BG < 100   2/10/2025 Morning    lidocaine (LMX4) 4 % external cream Apply topically 3 times daily. Apply to lower extremity for pain   " 2/10/2025    methadone (DOLOPHINE) 5 MG tablet Take 1 tablet (5 mg) by mouth 4 times daily. 42 tablet 0 2/11/2025 Morning    miconazole (MICATIN) 2 % external cream Apply topically 2 times daily. Apply to buttock, groin, thigh for redness until healed   2/10/2025 Bedtime    miconazole (MICATIN) 2 % external cream Apply topically every 12 hours as needed for other (to buttock, groin, thigh as needed for redness).   Unknown    mineral oil-hydrophilic petrolatum (AQUAPHOR) external ointment Apply to left thigh wound BID   2/10/2025 Bedtime    nystatin (MYCOSTATIN) 288068 UNIT/GM external powder Apply topically 2 times daily. Apply to affected area for redness under left breast   2/10/2025 Evening    ondansetron (ZOFRAN ODT) 4 MG ODT tab Take 1 tablet (4 mg) by mouth every 6 hours as needed for nausea or vomiting. 30 tablet 0 Unknown    polyethylene glycol (MIRALAX) 17 g packet Take 17 g by mouth 2 times daily as needed for constipation. 30 packet 3 Unknown    torsemide (DEMADEX) 20 MG tablet Take 60 mg by mouth daily   2/10/2025 Morning    wound support modular (EXPEDITE) LIQD bottle Take 60 mLs by mouth daily   Past Week    Zinc oxide 10 % CREA Externally apply topically 3 times daily Apply to coccyx area once per shift after brief changes   2/10/2025    zinc sulfate (ZINCATE) 220 (50 Zn) MG capsule Take 220 mg by mouth daily.   2/10/2025 Morning    [DISCONTINUED] apixaban ANTICOAGULANT (ELIQUIS) 5 MG tablet Take 1 tablet (5 mg) by mouth 2 times daily. 60 tablet 0 2/7/2025 Evening              Discharge Medications:     Current Discharge Medication List        START taking these medications    Details   !! acetaminophen (TYLENOL) 325 MG tablet Take 3 tablets (975 mg) by mouth every 8 hours for 10 days.  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 May discontinue if patient having bowel movement.).  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 HYDROmorphone (DILAUDID) 2 MG tablet Take 1 tablet (2 mg) by mouth every 3 hours as needed for moderate pain or severe pain.  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 ondansetron (ZOFRAN ODT) 4 MG ODT tab Take 1 tablet (4 mg) by mouth every 6 hours as needed for nausea or vomiting.  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 - Potential duplicate medications found. Please discuss with provider.        CONTINUE these medications which have CHANGED    Details   apixaban ANTICOAGULANT (ELIQUIS) 5 MG tablet  Take 1 tablet (5 mg) by mouth 2 times daily.  Qty: 60 tablet, Refills: 0    Associated Diagnoses: Chronic atrial fibrillation (H)      !! methadone (DOLOPHINE) 5 MG tablet Take 1 tablet (5 mg) by mouth every 8 hours for 14 days.  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 - Potential duplicate medications found. Please discuss with provider.        CONTINUE these medications which have NOT CHANGED    Details   !! acetaminophen (TYLENOL) 325 MG tablet Take 650 mg by mouth every 4 hours as needed for mild pain.      atorvastatin (LIPITOR) 40 MG tablet Take 1 tablet (40 mg) by mouth every evening    Associated Diagnoses: Hx of BKA, left (H)      calcium carbonate (TUMS) 500 MG chewable tablet Take 1 tablet (500 mg) by mouth 4 times daily as needed for heartburn.  Qty: 90 tablet, Refills: 0    Associated Diagnoses: Vitamin D deficiency      !! HYDROmorphone (DILAUDID) 2 MG tablet Take 2 tablets (4 mg) by mouth every 4 hours as needed for breakthrough pain (please give 1 hour before dressing change every 5 days).  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      insulin degludec (TRESIBA) 200 UNIT/ML pen Inject 30 Units subcutaneously every morning. Hold for BG < 100      lidocaine (LMX4) 4 % external cream Apply topically 3 times daily. Apply to lower extremity for pain      !! methadone (DOLOPHINE) 5 MG tablet Take 1 tablet (5 mg) by mouth 4 times daily.  Qty: 42 tablet, Refills: 0    Associated Diagnoses: Opioid dependence, uncomplicated (H)      !! miconazole (MICATIN) 2 % external cream Apply topically 2 times daily. Apply to buttock, groin, thigh for redness until healed      !! miconazole (MICATIN) 2 % external cream Apply topically every 12 hours as needed for other (to  buttock, groin, thigh as needed for redness).      mineral oil-hydrophilic petrolatum (AQUAPHOR) external ointment Apply to left thigh wound BID    Associated Diagnoses: Skin lesion      nystatin (MYCOSTATIN) 100965 UNIT/GM external powder Apply topically 2 times daily. Apply to affected area for redness under left breast      !! ondansetron (ZOFRAN ODT) 4 MG ODT tab Take 1 tablet (4 mg) by mouth every 6 hours as needed for nausea or vomiting.  Qty: 30 tablet, Refills: 0    Associated Diagnoses: Nausea      polyethylene glycol (MIRALAX) 17 g packet Take 17 g by mouth 2 times daily as needed for constipation.  Qty: 30 packet, Refills: 3    Associated Diagnoses: Constipation, unspecified constipation type      torsemide (DEMADEX) 20 MG tablet Take 60 mg by mouth daily      wound support modular (EXPEDITE) LIQD bottle Take 60 mLs by mouth daily    Associated Diagnoses: Ulcer of right lower extremity with fat layer exposed (H)      Zinc oxide 10 % CREA Externally apply topically 3 times daily Apply to coccyx area once per shift after brief changes      zinc sulfate (ZINCATE) 220 (50 Zn) MG capsule Take 220 mg by mouth daily.       !! - Potential duplicate medications found. Please discuss with provider.                  Antibiotics Prescribed at Discharge:   {:978494}           Day of Discharge Physical Exam:   Temp:  [97.2  F (36.2  C)-97.6  F (36.4  C)] 97.6  F (36.4  C)  Pulse:  [98] 98  BP: (126-137)/(64-72) 126/64  SpO2:  [95 %-96 %] 95 %    General: awake, alert, no acute distress, laying comfortably in bed   CV: warm, well perfused   Pulm: breathing comfortably on room air   Abdomen: soft, non-distended, appropriately tender, no rebound or guarding;  Incision***   Extremities: no edema, moving all extremities spontaneously and without apparent deficit            Final Pathology Result:   ***           Discharge Instructions and Follow-Up:     Discharge Procedure Orders   General info for SNF   Order Comments:  "Length of Stay Estimate: Short Term Care: Estimated # of Days <30  Condition at Discharge: Improving  Level of care:board and care  Rehabilitation Potential: Good  Admission H&P remains valid and up-to-date: Yes  Recent Chemotherapy: N/A  Use Nursing Home Standing Orders: Yes     Follow Up and recommended labs and tests   Order Comments: Follow up in vascular PA clinic on 3/10/25 for wound check, staple removal.     Reason for your hospital stay   Order Comments: Hospitalized post-amputation for pain control, PT/OT, and management of medical comorbidities. Worked well with therapies. Ready for discharge 2/17/25.     Wound care (specify)   Order Comments: Site:   Right AKA amputation site  Instructions:  Dressings should be changed at least once a day or whenever soiled. Remove old dressings and discard. Clean area with CHG scrubs gently. Replace with island dressings (Mepilex, Silverlon, Primapore, etc.) overlying staple line.     Activity - Up with nursing assistance     Order Specific Question Answer Comments   Is discharge order? Yes      Weight bearing status   Order Comments: NWB RLE - above-knee amputation     Physical Therapy Adult Consult   Order Comments: Evaluate and treat as clinically indicated.    Reason:  R AKA     Occupational Therapy Adult Consult   Order Comments: Evaluate and treat as clinically indicated.    Reason:  R AKA     Contact Isolation     Fall precautions     Diet   Order Comments: Follow this diet upon discharge: low sodium diet     Order Specific Question Answer Comments   Is discharge order? Yes              Discharge Disposition:     { :[id number]::\"Discharged to home\"}      Condition at discharge: {:412799}    Patient was discussed with staff,  ***, on the day of discharge.    Torey Alvarado MD          " change every 5 days).  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      insulin degludec (TRESIBA) 200 UNIT/ML pen Inject 30 Units subcutaneously every morning. Hold for BG < 100      lidocaine (LMX4) 4 % external cream Apply topically 3 times daily. Apply to lower extremity for pain      !! methadone (DOLOPHINE) 5 MG tablet Take 1 tablet (5 mg) by mouth 4 times daily.  Qty: 42 tablet, Refills: 0    Associated Diagnoses: Opioid dependence, uncomplicated (H)      !! miconazole (MICATIN) 2 % external cream Apply topically 2 times daily. Apply to buttock, groin, thigh for redness until healed      !! miconazole (MICATIN) 2 % external cream Apply topically every 12 hours as needed for other (to buttock, groin, thigh as needed for redness).      mineral oil-hydrophilic petrolatum (AQUAPHOR) external ointment Apply to left thigh wound BID    Associated Diagnoses: Skin lesion      nystatin (MYCOSTATIN) 179742 UNIT/GM external powder Apply topically 2 times daily. Apply to affected area for redness under left breast      !! ondansetron (ZOFRAN ODT) 4 MG ODT tab Take 1 tablet (4 mg) by mouth every 6 hours as needed for nausea or vomiting.  Qty: 30 tablet, Refills: 0    Associated Diagnoses: Nausea      polyethylene glycol (MIRALAX) 17 g packet Take 17 g by mouth 2 times daily as needed for constipation.  Qty: 30 packet, Refills: 3    Associated Diagnoses: Constipation, unspecified constipation type      torsemide (DEMADEX) 20 MG tablet Take 60 mg by mouth daily      wound support modular (EXPEDITE) LIQD bottle Take 60 mLs by mouth daily    Associated Diagnoses: Ulcer of right lower extremity with fat layer exposed (H)      Zinc oxide 10 % CREA Externally apply topically 3 times daily Apply to coccyx area once per shift after brief changes      zinc  sulfate (ZINCATE) 220 (50 Zn) MG capsule Take 220 mg by mouth daily.       !! - Potential duplicate medications found. Please discuss with provider.             Day of Discharge Physical Exam:   Temp:  [97.2  F (36.2  C)-97.6  F (36.4  C)] 97.6  F (36.4  C)  Pulse:  [98] 98  BP: (126-137)/(64-72) 126/64  SpO2:  [95 %-96 %] 95 %    General: awake, alert, no acute distress, laying comfortably in bed   CV: warm, well perfused   Pulm: breathing comfortably on room air   Abdomen: soft, non-distended, appropriately tender, no rebound or guarding;  Incision of R AKA wound healing well, staples in place. CDI, no SOI.    Extremities: no edema, moving all extremities spontaneously and without apparent deficit            Final Pathology Result:     Final Diagnosis   RIGHT LEG, ABOVE-KNEE AMPUTATION:  -SKIN WITH ULCERATION, ACUTE AND CHRONIC CELLULITIS, WET GANGRENE (DISTAL ASPECT OF SPECIMEN)  -SKIN, LATERAL LEG, WITH SEVERE EROSION, PATCHY CHRONIC AND ACUTE INFLAMMATION, AND DERMAL VASCULAR CONGESTION  -ATROPHY OF SKELETAL MUSCLE  -SEVERE ATHEROSCLEROSIS  -VIABLE BONE MARGIN; NO EVIDENCE OF OSTEOMYELITIS  -SLIGHTLY HYPERCELLULAR MARROW FOR AGE (40%) WITH TRILINEAGE MATURATION; FAVOR REACTIVE HEMATOPOIESIS  -NO TUMOR SEEN            Discharge Instructions and Follow-Up:     Discharge Procedure Orders   General info for SNF   Order Comments: Length of Stay Estimate: Short Term Care: Estimated # of Days <30  Condition at Discharge: Improving  Level of care:board and care  Rehabilitation Potential: Good  Admission H&P remains valid and up-to-date: Yes  Recent Chemotherapy: N/A  Use Nursing Home Standing Orders: Yes     Follow Up and recommended labs and tests   Order Comments: Follow up in vascular PA clinic on 3/10/25 for wound check, staple removal.     Reason for your hospital stay   Order Comments: Hospitalized post-amputation for pain control, PT/OT, and management of medical comorbidities. Worked well with therapies. Ready  for discharge 2/17/25.     Wound care (specify)   Order Comments: Site:   Right AKA amputation site  Instructions:  Dressings should be changed at least once a day or whenever soiled. Remove old dressings and discard. Clean area with CHG scrubs gently. Replace with island dressings (Mepilex, Silverlon, Primapore, etc.) overlying staple line.     Activity - Up with nursing assistance     Order Specific Question Answer Comments   Is discharge order? Yes      Weight bearing status   Order Comments: NWB RLE - above-knee amputation     Physical Therapy Adult Consult   Order Comments: Evaluate and treat as clinically indicated.    Reason:  R AKA     Occupational Therapy Adult Consult   Order Comments: Evaluate and treat as clinically indicated.    Reason:  R AKA     Contact Isolation     Fall precautions     Diet   Order Comments: Follow this diet upon discharge: low sodium diet     Order Specific Question Answer Comments   Is discharge order? Yes              Discharge Disposition:     Discharged to long-term care facility      Condition at discharge: Stable    Patient was discussed with staff, Dr. Dorman, on the day of discharge.      Hilda Aceves MD  PGY-6  Vascular Surgery  Pager: [phone]    Please page me directly with any questions/concerns during regular weekday hours before 5PM. If there is no response, if it is a weekend, or if it is during evening hours, then please use the [facility] system to page the first-call resident on call for vascular surgery.

## 2025-02-17 NOTE — PROGRESS NOTES
Care Management Follow Up    Length of Stay (days): 6    Expected Discharge Date: 02/17/2025     Concerns to be Addressed:       Patient plan of care discussed at interdisciplinary rounds: Yes    Anticipated Discharge Disposition: Long Term Care     Anticipated Discharge Services: Transportation Services  Anticipated Discharge DME: None    Patient/family educated on Medicare website which has current facility and service quality ratings: no  Education Provided on the Discharge Plan: Yes  Patient/Family in Agreement with the Plan: yes    Referrals Placed by CM/SW:    Private pay costs discussed: transportation costs    Discussed  Partnership in Safe Discharge Planning  document with patient/family: Yes     Handoff Completed: No, handoff not indicated or clinically appropriate    Additional Information:  10:57 AM  Discharge plan is for pt to go back to [facility] today 2/17. W/c ride set up between 2:40-3:20pm.    Pharmacy: [facility]  Phone # [phone]  Fax # [phone]     1:28 PM  [facility] w/c ride set between 4:53-5:38pm.     Next Steps: Orders/scripts. Needs everything sent by noon. [id number]    DOYLE Marroquin

## 2025-02-17 NOTE — PROGRESS NOTES
[facility] ACUTE PAIN SERVICE    [facility], [facility], [facility], [place]   PAIN Progress Note    Assessment/Plan:  Linda Loredo is a 80 year old female who was admitted on 2/11/2025. She was admitted under vascular surgery. 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  After the BKA, she developed lymphedema and chronic wounds.  She therefore underwent right above-knee amputation. [facility] was consulted for medical comanagement.  She struggled with pain control postoperatively, and pain team was consulted. She was somnolent postop day 1 and 2. UA was obtained due to somnolence, with some mild abnormality and pyuria ultimately growing mixed urogenital danny.  Wound culture grew 1+ Corynebacterium striated.  Magnesium and vitamin D were added given her history of neuropathy.  On February 16 she felt unwell and had retching and CT of the abdomen was ordered.  History of CKD 3, chronic pain (neuropathy), insulin-dependent type 2 diabetes, paroxysmal atrial fibrillation, right-sided heart failure, PAD, BERLIN, osteomyelitis. Sees Viridiana Daly at [facility]     Post op day: 6 Days Post-Op. RIGHT ABOVE KNEE AMPUTATION, Right - Knee     Home medications include: Acetaminophen 650 mg every 4 hours as needed, Eliquis 5 mg twice daily, Dilaudid 4 mg every 4 hours as needed for breakthrough pain, methadone 5 mg 4 times daily    PLAN:   1) Pain is consistent with post op pain in the setting of chronic pain. Pain control has been challenging (either somnolent or crying out)    Multimodal Medication Therapy  Topical: Lidocaine ointment 3 times daily  NSAID'S: Estimated Creatinine Clearance: 87.7 mL/min (based on SCr of 0.55 mg/dL).  On Eliquis.  Steroids: None  Muscle Relaxants: Robaxin to 50 mg every 6 hours as needed  Adjuvants: Acetaminophen 975 mg every 8 hours   Antidepressants/anxiolytics:none  Opioids: Methadone 5 mg every 8 hours (home  med is methadone 5 mg 4 times daily however this was decreased due to somnolence), Dilaudid 2 mg every 3 hours as needed   IV Pain medication:  none  Non-medication interventions:  Ice, Rest, PT, OT, Distraction (TV, Music, Reading), and Meditation  Constipation Prophylaxis: Scheduled and prn: Senokot scheduled, bisacodyl suppository as needed    -Opioid prescriber has been [facility]   [place]ed from [place] on 2/17/25. This indicates ongoing fills for methadone, Lyrica, hydromorphone  2/8/2025 methadone 5 mg #60 for 30 days  1/18/2025 Dilaudid 2 mg #28 for 4 days  1/11/2025 methadone 5 mg #90 for 23 days  1/6/2025 methadone 5 mg #30 for 7 days  1/1/2025 methadone 5 mg #30 for 7 days  12/24/2024 methadone 5 mg #30 for 8 days  12/10/2024 Lyrica 25 mg #60 for 30 days  Discharge Recommendations - We recommend prescribing the following at the time of discharge: per surgery     Subjective:  Describes pain as 5-6/10 and aching, sharp in the RLE. The patient denies nausea, vomiting, constipation, shortness of breath, dizziness, fever, and chills.     Principal Problem:  <principal problem not specified>     Patient Active Problem List   Diagnosis    Type 2 diabetes mellitus with complication, with long-term current use of insulin (H)    Essential hypertension with goal blood pressure less than 140/90    Mixed stress and urge urinary incontinence    Insomnia    Mixed hyperlipidemia    Obesity with BMI >35 and comorbidity    Diabetic polyneuropathy associated with type 2 diabetes mellitus (H)    Osteoarthritis    BERLIN (obstructive sleep apnea)    Fibromyalgia    Allergic rhinitis    Anticoagulation monitoring, INR range 2-3    Microalbuminuria due to type 2 diabetes mellitus (H)    Chronic atrial fibrillation (H)    Osteopenia    Vitamin D deficiency    Umbilical hernia without obstruction and without gangrene    Primary hyperparathyroidism    Chronic pain    Opioid dependence, uncomplicated (H)    Chronic  right-sided heart failure (H)    Chronic kidney disease, stage 3b (H)    Cardiac murmur, unspecified    Hx of osteomyelitis    Lymphedema, not elsewhere classified    Constipation    Other abnormalities of gait and mobility    Venous stasis    Acute kidney injury    Sepsis, due to unspecified organism, unspecified whether acute organ dysfunction present (H)    Metabolic encephalopathy    Pressure injury of deep tissue of sacral region    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)    Anemia    Pulmonary hypertension (H)    Acute cystitis without hematuria    Cellulitis and abscess of foot excluding toe    Diabetic ulcer of other part of right foot associated with type 2 diabetes mellitus, with necrosis of bone (H)    Pressure ulcer of right heel, stage 4 (H)    Cellulitis of right lower extremity    Venous stasis ulcer of right calf, unspecified ulcer stage, unspecified whether varicose veins present (H)    Fungal infection    Dermatitis associated with incontinence    Above knee amputation of right lower extremity (H)        Objective:    History   Drug Use No          Tobacco Use      Smoking status: Never      Smokeless tobacco: Never      Vital signs in last 24 hours:  /64 (BP Location: Left arm, Patient Position: Sitting)   Pulse 98   Temp 97.6  F (36.4  C) (Oral)   Resp 18   Wt 81.2 kg (179 lb 0.2 oz)   SpO2 95%   BMI 28.89 kg/m      Weight:     Vitals:    02/13/25 0620 02/13/25 2322 02/15/25 0400 02/16/25 0500   Weight: 83.6 kg (184 lb 4.9 oz) 81.8 kg (180 lb 5.4 oz) 83.7 kg (184 lb 8.4 oz) 80.6 kg (177 lb 11.1 oz)    02/17/25 0500   Weight: 81.2 kg (179 lb 0.2 oz)      Weight change: 0.6 kg (1 lb 5.2 oz)  Body mass index is 28.89 kg/m .    Intake/Output last 3 shifts:  No intake/output data recorded.  Intake/Output this shift:  No intake/output data recorded.    Review of Systems:   As per subjective, all others negative.    Physical Exam:  General Appearance:  Alert,  cooperative, no distress, sitting up in bed    Head:  Normocephalic, without obvious abnormality, atraumatic   Eyes:  PERRL, conjunctiva/corneas clear, EOM's intact   Nose: Nares normal, septum midline   Throat: Lips, mucosa, and tongue normal; poor dentition   Neck: Supple, symmetrical, trachea midline   Back:   Symmetric, no curvature, ROM normal   Lungs:   Clear to auscultation bilaterally, respirations unlabored, room air   Chest Wall:  No tenderness or deformity   Heart:  Regular rate and rhythm, S1, S2 normal    Abdomen:   Soft, non-tender, bowel sounds active all four quadrants, obese abdomen   Extremities: Right AKA, left BKA   Skin: Skin pale, warm, dry   Neurologic: Alert and oriented X 3, Moves all 4 extremities   Psych: Affect is appropriate     Imaging: Reviewed I have personally reviewed pertinent notes, labs, tests, and radiologic imaging in patient's chart.  Labs: Reviewed I have personally reviewed pertinent notes, labs, tests, and radiologic imaging in patient's chart.  Notes: Reviewed I have personally reviewed pertinent notes, labs, tests, and radiologic imaging in patient's chart.    Total time spent 35 minutes with greater than 50% in consultation, education and coordination of care.   Also discussed with RN.   Treatment plan includes: multimodal pain approach, Hospital Medicine Service for medical management, vascular surgery, PT, OT, social work.   Patient educated regarding: multimodal pain approach and medications as listed above.   Elements of Medical Decision Making as described above. Acute or chronic illness or injury or surgery. High risk therapy including opioids, high risk drug therapy including oral and/or parenteral controlled substances.    Patient is understanding of the plan. All questions and concerns addressed to patient's satisfaction.     SAEID Dorsey-C  Acute Care Inpatient Pain Management Program  [facility] ([facility])  Hours  of coverage Monday-Friday [phone]. After hours please contact Primary team.   Page via Epic chat or [facility]

## 2025-02-17 NOTE — PROGRESS NOTES
Dr. Aceves placed discharge orders. Afterwards Dr. Vargas placed orders for a UA and chest xray. Spoke with Dr. Vargas and she gave the ok to discharge the patient without completing the UA and chest xray. Transport picked up the patient via wheelchair and patient was discharged to [facility].

## 2025-02-17 NOTE — PLAN OF CARE
"  Problem: Delirium  Goal: Optimal Coping  Outcome: Not Progressing   Goal Outcome Evaluation:    Pt had been snoozing in her W/C, able to aroused by voice. Pt had been A/Ox4. Able to let her needs known and able to use call light. Staff and writer had multiple offered a complete bed bath, to have lotion applied, assisted with oral care, and hair washed, and pt had refused cares and would state that \" I have the God given right to refused and if you try I will hit you.\" Pt refused to be checked for incontinents, and staff have offered every two hours. Pt had refused medications, VS, BS, at times. Pt refused to eat, Pt had agreed to have orange juices.     Pt had agreed at 0300 hours for a completed Bed bath with her hair washed with an Ax2. Two staff members had to actively washed pt for 1 hour and forty minutes.                            "

## 2025-02-17 NOTE — PROGRESS NOTES
Occupational Therapy Discharge Summary    Reason for therapy discharge:    Discharged to long term care facility.    Progress towards therapy goal(s). See goals on Care Plan in [facility] electronic health record for goal details.  Goals partially met.  Barriers to achieving goals:   discharge from facility.    Therapy recommendation(s):    Continued therapy is recommended.  Rationale/Recommendations:  Pt would benefit from additional OT to increase independence in ADLs and improve functional mobility.

## 2025-02-17 NOTE — PROGRESS NOTES
SPIRITUAL HEALTH SERVICES Progress Note          Referral Source/Reason for Visit: Follow up support              Summary and Recommendations -   Pat is discharging this afternoon and it [birth year] [age].  Margaret was in good spirits and reflected on working to get better and joked about her fighting spirit.  She named feeling her pain has been better managed.       PLAN:  No plans to follow due to pending discharge     Tyler Franklin M.Div., [facility]  Staff    [phone]   Spiritual Health Services is available 24/7 for emergent requests and consults, either by paging the on-call  or by entering an ASAP/STAT consult in [facility], which will also page the on-call .

## 2025-02-17 NOTE — PLAN OF CARE
Goal Outcome Evaluation:      Plan of Care Reviewed With: patient    Overall Patient Progress: improvingOverall Patient Progress: improving    Outcome Evaluation: Pt alert and oriented x 4. Pain controlled with PRN dilaudid and scheduled methadone. Dressing changed by vascular. Denies N/V. LBM 2/16.  Pt often refuses re postioning but is able to help move herself in bed. WBC elevated this morning, the plan is to discharge back to [facility] pending stat CBC to recheck WBC.     Problem: Pain Acute  Goal: Optimal Pain Control and Function  Outcome: Progressing  Intervention: Develop Pain Management Plan  Recent Flowsheet Documentation  Taken 2/17/2025 0855 by Louie Doherty, RN  Pain Management Interventions: medication offered but refused  Taken 2/17/2025 0805 by Louie Doherty RN  Pain Management Interventions:   medication (see MAR)   back rub   repositioned  Intervention: Prevent or Manage Pain  Recent Flowsheet Documentation  Taken 2/17/2025 0805 by Louie Doherty RN  Medication Review/Management: medications reviewed     Problem: Skin Injury Risk Increased  Goal: Skin Health and Integrity  Outcome: Progressing  Intervention: Plan: Nurse Driven Intervention: Moisture Management  Recent Flowsheet Documentation  Taken 2/17/2025 0805 by Louie Doherty RN  Moisture Interventions: Urinary collection device  Intervention: Plan: Nurse Driven Intervention: Friction and Shear  Recent Flowsheet Documentation  Taken 2/17/2025 0805 by Louie Doherty RN  Friction/Shear Interventions: HOB 30 degrees or less  Intervention: Optimize Skin Protection  Recent Flowsheet Documentation  Taken 2/17/2025 0805 by Louie Doherty, RN  Activity Management: activity adjusted per tolerance  Head of Bed (HOB) Positioning: HOB at 45 degrees     Louie Doherty RN, ONC

## 2025-02-17 NOTE — PROGRESS NOTES
Vascular Surgery Note    Patient had nausea with episode of dry heaving without emesis yesterday. Reports that she had felt constipated, but after having being able to get up into the BSC, had 3 BMs in succession and felt much better. Today, she reports feeling well, ate breakfast and lunch without issue.     WBC 13.4 today from 8.3, but patient remains afebrile. Denies any further nausea, also denies abdominal pain, CP, SOB, or significant pain at surgical site.     On exam, R AKA incision is healing well, staples in place. No drainage or cellulitis. Stump is soft without evidence of hematoma.     A/P: 6 Days Post-Op s/p formalization of R BKA to AKA. Mild leukocytosis with some nausea yesterday which resolved once patient had bowel movements. No further diarrhea or N/V today. Incision is healing well without evidence of infection, but she had an increase in her WBC without any discernable etiology today. Will plan to obtain repeat CBC this afternoon; if WBC still elevated, will keep patient in-house for one more night prior to discharge. Spoke with the patient and her daughter about the plan, both are amenable.       Hilda Aceves MD  PGY-6  Vascular Surgery  Pager: [phone]    Please page me directly with any questions/concerns during regular weekday hours before 5PM. If there is no response, if it is a weekend, or if it is during evening hours, then please use the [facility] system to page the first-call resident on call for vascular surgery.

## 2025-02-18 ENCOUNTER — PATIENT OUTREACH (OUTPATIENT)
Dept: CARE COORDINATION | Facility: CLINIC | Age: 80
End: 2025-02-18
Payer: COMMERCIAL

## 2025-02-18 LAB
BACTERIA SPEC CULT: NORMAL
BACTERIA TISS BX CULT: NORMAL

## 2025-02-18 NOTE — PROGRESS NOTES
Connected Care Resource Center    Background: Transitional Care Management program identified per system criteria and reviewed by Connected Care Resource Center team for possible outreach.    Assessment: Upon chart review, CCRC Team member will not proceed with patient outreach related to this episode of Transitional Care Management program due to reason below:    Non-[facility] TCU: CCRC team member noted patient discharged to TCU/ARU/LTACH. Patient is not established with a [facility] currently supported by Primary Care-Care Coordination therefore handoff to Primary Care-Care Coordination is not appropriate at this time.    Plan: Transitional Care Management episode addressed appropriately per reason noted above.      PAMELA Escalante  Connected Care Resource [place], [facility]    *Connected Care Resource Team does NOT follow patient ongoing. Referrals are identified based on internal discharge reports and the outreach is to ensure patient has an understanding of their discharge instructions.

## 2025-02-19 ENCOUNTER — DOCUMENTATION ONLY (OUTPATIENT)
Dept: ORTHOPEDICS | Facility: CLINIC | Age: 80
End: 2025-02-19
Payer: COMMERCIAL

## 2025-02-19 LAB
PATH REPORT.COMMENTS IMP SPEC: NORMAL
PATH REPORT.COMMENTS IMP SPEC: NORMAL
PATH REPORT.FINAL DX SPEC: NORMAL
PATH REPORT.GROSS SPEC: NORMAL
PATH REPORT.MICROSCOPIC SPEC OTHER STN: NORMAL
PATH REPORT.RELEVANT HX SPEC: NORMAL
PHOTO IMAGE: NORMAL

## 2025-02-19 PROCEDURE — 88307 TISSUE EXAM BY PATHOLOGIST: CPT | Mod: 26 | Performed by: PATHOLOGY

## 2025-02-19 NOTE — PROGRESS NOTES
After reviewing Pat's chart, it appears she was discharged from [facility]. Pt is a recent right completed AKA (2/11/25, Dr. Sanches) and left BKA. Pat has a follow-up scheduled with Bettye Felipe PA-C on 3/10/25. Pt is unsure at this time if she would like to pursue prosthetic care in the future. Unclear if she will be a good prosthetic candidate. I will reach out to Pat before her vascular follow-up to see how things are going.    Electronically signed by Yael Orlando CPO, NAYANO

## 2025-02-20 LAB — BACTERIA SPEC CULT: NORMAL

## 2025-02-27 LAB — BACTERIA SPEC CULT: NORMAL

## 2025-03-05 ENCOUNTER — DOCUMENTATION ONLY (OUTPATIENT)
Dept: ORTHOPEDICS | Facility: CLINIC | Age: 80
End: 2025-03-05
Payer: COMMERCIAL

## 2025-03-05 NOTE — PROGRESS NOTES
I spoke with Margaret on the phone today to see how she was doing. She is currently at [facility]. Recent R AKA, L BKA. She is doing well and is just focusing on healing. She has been working on transferring in and out of bed and onto the commode. She was able to give me more details regarding her current prosthesis for her left BKA. She has not used the prosthesis since the surgery on her right leg. She couldn't remember what company provided her with her prosthesis, but she was able to tell me that someone came to see her at [facility] in [place], [place], where she is from, to measure her. It was likely someone from [facility] in [place], [place] that saw her and provided her left BK prosthesis.    Margaret has a follow-up scheduled with Bettye Felipe PA-C on 3/10/25. Margaret is unsure at this time if she would like to pursue prosthetic care in the future or where that might be. She may want to continue seeing [facility] for her prosthetic care. I will reach out to Margaret after her vascular follow-up to get an updated status.    Electronically signed by Yael Orlando CPO, NAYANO

## 2025-03-06 LAB — BACTERIA SPEC CULT: NORMAL

## 2025-03-11 LAB — BACTERIA SPEC CULT: NO GROWTH

## (undated) DEVICE — LINEN TOWEL PACK X5 5464

## (undated) DEVICE — BNDG ELASTIC 4"X5YDS UNSTERILE 6611-40

## (undated) DEVICE — GLOVE SURG GAMMEX PI POLYISOPRENE WHITE SZ 8 LF 20685780

## (undated) DEVICE — ESU PENCIL SMOKE EVAC W/ROCKER SWITCH 0703-047-000

## (undated) DEVICE — GOWN SURGICAL SMARTGOWN 2XL 89075

## (undated) DEVICE — PACK EXTREMITY LATEX FREE SOP32HFFCS

## (undated) DEVICE — DRSG ADAPTIC 3X8" 6113

## (undated) DEVICE — SUTURE VICRYL+ 2-0 CT-2 CR 8X18" VCP726D

## (undated) DEVICE — GLOVE BIOGEL PI MICRO SZ 5.5 48555

## (undated) DEVICE — SYR BULB IRRIG 50ML LATEX FREE 0035280

## (undated) DEVICE — SYR 30ML LL W/O NDL 302832

## (undated) DEVICE — CLIP APPLIER 11" MED LIGACLIP MCM20

## (undated) DEVICE — GLOVE BIOGEL PI MICRO SZ 8.0 48580

## (undated) DEVICE — ESU GROUND PAD UNIVERSAL W/O CORD

## (undated) DEVICE — DRAPE LAP W/TROUGHS

## (undated) DEVICE — DRAIN RND 3/16 15FR SIL 0070220

## (undated) DEVICE — CUFF TOURN 34IN STRL DISP

## (undated) DEVICE — SUCTION MANIFOLD NEPTUNE 2 SYS 4 PORT 0702-020-000

## (undated) DEVICE — ELECTRODE PATIENT RETURN ADULT L10 FT 2 PLATE CORD 0855C

## (undated) DEVICE — DRAPE EXTREMITY W/ARMBOARD 29405

## (undated) DEVICE — SU VICRYL+ 0 8-18 CT1/CR UND VCP840D

## (undated) DEVICE — DECANTER BAG 2002S

## (undated) DEVICE — BONE WAX 2.5GM W31G

## (undated) DEVICE — PREP SKIN SCRUB TRAY 4461A

## (undated) DEVICE — SUTURE SILK 3-0 TIES 30IN SA84H

## (undated) DEVICE — DRSG ABDOMINAL 07 1/2X8" 7197D

## (undated) DEVICE — PREP CHLORAPREP 26ML TINTED ORANGE  260815

## (undated) DEVICE — SUCTION TIP FLEXI CLEAR TIP DISP K62

## (undated) DEVICE — DRSG TEGADERM 4X4 3/4" 1626

## (undated) DEVICE — RULER SURGICAL PLASTIC STRL LF CS628

## (undated) DEVICE — STPL SKIN 35W 6.9MM  PXW35

## (undated) DEVICE — DRSG ABD TNDRSRB WET PRUF 8IN X 10IN STRL  9194A

## (undated) DEVICE — BLADE SAW SAGITTAL STRK 25X90X1.37MM 4H SYS 6 6125-137-090

## (undated) DEVICE — SOL NACL 0.9% IRRIG 1000ML BOTTLE 2F7124

## (undated) DEVICE — BLADE DERMATOME ZIMMER  00-8800-000-10

## (undated) DEVICE — TAPE MEASURE PAPER 36" LF NI14-1300

## (undated) DEVICE — CUFF TOURN 18IN STRL DISP

## (undated) DEVICE — GOWN IMPERVIOUS SPECIALTY XLG/XLONG 32474

## (undated) DEVICE — SU SILK 0 24" TIE SA76G

## (undated) DEVICE — CAST PADDING 4" STERILE 9044S

## (undated) DEVICE — DRSG WOUND VAC SPONGE MED BLACK M8275052/5

## (undated) DEVICE — SU SILK 2-0 SH 30" K833H

## (undated) DEVICE — SUTURE VICRYL+ 2-0 18 CT1/CR VLT VCP839D

## (undated) DEVICE — DRAIN RESERVOIR 100ML JP 0070740

## (undated) DEVICE — DRSG XEROFORM 5X9" 8884431605

## (undated) DEVICE — BLADE GIGLI 12" 00-2808-001-00

## (undated) DEVICE — SU VICRYL 2-0 TIE 12X18" J905T

## (undated) DEVICE — ESU ELEC BLADE 2.75" COATED/INSULATED E1455

## (undated) DEVICE — GLOVE BIOGEL PI MICRO INDICATOR UNDERGLOVE SZ 8.0 48980

## (undated) DEVICE — DRAPE SHEET REV FOLD 3/4 9349

## (undated) DEVICE — PREP CHLORAPREP 26ML TINTED HI-LITE ORANGE 930815

## (undated) DEVICE — PACK MINOR SBA15MIFSE

## (undated) DEVICE — CLIP APPLIER 13" LG LIGACLIP MCL20

## (undated) DEVICE — TUBING SUCTION 12"X1/4" N612

## (undated) DEVICE — GLOVE BIOGEL INDICATOR 7.5 LF 41675

## (undated) DEVICE — SOL WATER IRRIG 1000ML BOTTLE 2F7114

## (undated) DEVICE — SOL WATER IRRIG 1000ML BOTTLE 07139-09

## (undated) DEVICE — BLADE CLIPPER 4406

## (undated) DEVICE — COUNTER NEEDLE ADH & FOAM 20CT 9106

## (undated) DEVICE — DRSG GAUZE 4X4" TRAY

## (undated) DEVICE — PACK EXTREMITY SOP15EXFSD

## (undated) DEVICE — CLIP HORIZON MED BLUE 002200

## (undated) DEVICE — MANIFOLD NEPTUNE 4 PORT 700-20

## (undated) DEVICE — SUCTION TIP YANKAUER STR K87

## (undated) DEVICE — SUTURE SILK 3-0 TIES 18IN A184H

## (undated) DEVICE — NDL ANGIOCATH 18GA 1.25" 4055

## (undated) DEVICE — DRSG KERLIX 4 1/2"X4YDS ROLL 6730

## (undated) DEVICE — DRESSING XEROFORM PETROLATUM 5X9 33605

## (undated) DEVICE — SU CHROMIC 3-0 SH 27" G122H

## (undated) DEVICE — LINEN LEG ROLL 5489

## (undated) DEVICE — Device

## (undated) DEVICE — CONNECTOR BLAKE DRAIN SGL BCC1

## (undated) DEVICE — WIRE SAW GIGL 20" LOOP END SS NL851

## (undated) DEVICE — CUFF TOURN 30IN STRL DISP 5921030235

## (undated) DEVICE — DRSG KERLIX FLUFFS X5

## (undated) DEVICE — BLADE SAGITTAL (SO-609) 2108-109

## (undated) DEVICE — BLADE KNIFE SURG 10 371110

## (undated) DEVICE — SUCTION MANIFOLD NEPTUNE 2 SYS 1 PORT 702-025-000

## (undated) DEVICE — SU VICRYL 2-0 CT-1 18' J739D

## (undated) DEVICE — SU SILK 2-0 SH CR 8X18" C012D

## (undated) DEVICE — SU VICRYL+ 3-0 CT1 36IN UND VCP944H

## (undated) DEVICE — DERMACARRIER 1.5:1 ZIMMER 00-2195-012-00

## (undated) DEVICE — SU SILK 2-0 FS-1 18" 685G

## (undated) DEVICE — PACK LAPAROTOMY CUSTOM LAKES

## (undated) DEVICE — ESU PENCIL W/SMOKE EVAC NEPTUNE STRYKER 0703-046-000

## (undated) DEVICE — CANISTER WOUND VAC W/GEL 500ML M8275063/5

## (undated) DEVICE — GLOVE BIOGEL PI ULTRATOUCH G SZ 6.0 42160

## (undated) DEVICE — DRSG KERLIX 4 1/2"X4YDS ROLL 6715

## (undated) DEVICE — SUTURE VICRYL+ 3-0 18 SH/CR UND VCP864

## (undated) DEVICE — SU ETHIBOND 0 CT-2 CR 8X18" CX27D

## (undated) DEVICE — CUSTOM PACK TOTAL KNEE SOP5BTKHEC

## (undated) DEVICE — BLADE KNIFE SURG 15 371115

## (undated) DEVICE — SOL NACL 0.9% IRRIG 1000ML BOTTLE 07138-09

## (undated) DEVICE — SU SILK 3-0 SH CR 8X18" C013D

## (undated) DEVICE — CANISTER WOUND VAC W/GEL 1000ML M8275093/5

## (undated) DEVICE — SU VICRYL 0 TIE 12X18" J906G

## (undated) DEVICE — DECANTER VIAL 2006S

## (undated) DEVICE — SU SILK 0 SH 30" K834H

## (undated) DEVICE — LIGACLIP MEDIUM AESCULAP B2180-1

## (undated) DEVICE — SPONGE LAP 18X18" X8435

## (undated) RX ORDER — LIDOCAINE HYDROCHLORIDE 10 MG/ML
INJECTION, SOLUTION INFILTRATION; PERINEURAL
Status: DISPENSED
Start: 2023-10-03

## (undated) RX ORDER — DEXAMETHASONE SODIUM PHOSPHATE 4 MG/ML
INJECTION, SOLUTION INTRA-ARTICULAR; INTRALESIONAL; INTRAMUSCULAR; INTRAVENOUS; SOFT TISSUE
Status: DISPENSED
Start: 2024-11-14

## (undated) RX ORDER — LIDOCAINE HYDROCHLORIDE 10 MG/ML
INJECTION, SOLUTION EPIDURAL; INFILTRATION; INTRACAUDAL; PERINEURAL
Status: DISPENSED
Start: 2025-02-11

## (undated) RX ORDER — FENTANYL CITRATE-0.9 % NACL/PF 10 MCG/ML
PLASTIC BAG, INJECTION (ML) INTRAVENOUS
Status: DISPENSED
Start: 2025-02-11

## (undated) RX ORDER — GLYCOPYRROLATE 0.2 MG/ML
INJECTION, SOLUTION INTRAMUSCULAR; INTRAVENOUS
Status: DISPENSED
Start: 2024-11-14

## (undated) RX ORDER — FENTANYL CITRATE 0.05 MG/ML
INJECTION, SOLUTION INTRAMUSCULAR; INTRAVENOUS
Status: DISPENSED
Start: 2023-10-07

## (undated) RX ORDER — HEPARIN SODIUM 200 [USP'U]/100ML
INJECTION, SOLUTION INTRAVENOUS
Status: DISPENSED
Start: 2023-10-03

## (undated) RX ORDER — GABAPENTIN 300 MG/1
CAPSULE ORAL
Status: DISPENSED
Start: 2023-09-26

## (undated) RX ORDER — HYDROMORPHONE HCL IN WATER/PF 6 MG/30 ML
PATIENT CONTROLLED ANALGESIA SYRINGE INTRAVENOUS
Status: DISPENSED
Start: 2023-10-14

## (undated) RX ORDER — FENTANYL CITRATE 50 UG/ML
INJECTION, SOLUTION INTRAMUSCULAR; INTRAVENOUS
Status: DISPENSED
Start: 2023-10-03

## (undated) RX ORDER — FENTANYL CITRATE 50 UG/ML
INJECTION, SOLUTION INTRAMUSCULAR; INTRAVENOUS
Status: DISPENSED
Start: 2023-10-07

## (undated) RX ORDER — MINERAL OIL
OIL (ML) MISCELLANEOUS
Status: DISPENSED
Start: 2024-01-02

## (undated) RX ORDER — DEXAMETHASONE SODIUM PHOSPHATE 4 MG/ML
INJECTION, SOLUTION INTRA-ARTICULAR; INTRALESIONAL; INTRAMUSCULAR; INTRAVENOUS; SOFT TISSUE
Status: DISPENSED
Start: 2023-10-07

## (undated) RX ORDER — FENTANYL CITRATE 50 UG/ML
INJECTION, SOLUTION INTRAMUSCULAR; INTRAVENOUS
Status: DISPENSED
Start: 2024-01-02

## (undated) RX ORDER — EPHEDRINE SULFATE 50 MG/ML
INJECTION, SOLUTION INTRAMUSCULAR; INTRAVENOUS; SUBCUTANEOUS
Status: DISPENSED
Start: 2025-02-11

## (undated) RX ORDER — HEPARIN SODIUM 1000 [USP'U]/ML
INJECTION, SOLUTION INTRAVENOUS; SUBCUTANEOUS
Status: DISPENSED
Start: 2024-01-02

## (undated) RX ORDER — ONDANSETRON 2 MG/ML
INJECTION INTRAMUSCULAR; INTRAVENOUS
Status: DISPENSED
Start: 2025-02-11

## (undated) RX ORDER — DEXAMETHASONE SODIUM PHOSPHATE 4 MG/ML
INJECTION, SOLUTION INTRA-ARTICULAR; INTRALESIONAL; INTRAMUSCULAR; INTRAVENOUS; SOFT TISSUE
Status: DISPENSED
Start: 2025-02-11

## (undated) RX ORDER — ONDANSETRON 2 MG/ML
INJECTION INTRAMUSCULAR; INTRAVENOUS
Status: DISPENSED
Start: 2024-01-02

## (undated) RX ORDER — FENTANYL CITRATE 0.05 MG/ML
INJECTION, SOLUTION INTRAMUSCULAR; INTRAVENOUS
Status: DISPENSED
Start: 2023-10-14

## (undated) RX ORDER — PROPOFOL 10 MG/ML
INJECTION, EMULSION INTRAVENOUS
Status: DISPENSED
Start: 2025-02-11

## (undated) RX ORDER — FENTANYL CITRATE 50 UG/ML
INJECTION, SOLUTION INTRAMUSCULAR; INTRAVENOUS
Status: DISPENSED
Start: 2025-02-11

## (undated) RX ORDER — GABAPENTIN 100 MG/1
CAPSULE ORAL
Status: DISPENSED
Start: 2023-09-26

## (undated) RX ORDER — ONDANSETRON 2 MG/ML
INJECTION INTRAMUSCULAR; INTRAVENOUS
Status: DISPENSED
Start: 2024-07-14

## (undated) RX ORDER — CEFAZOLIN SODIUM 1 G/3ML
INJECTION, POWDER, FOR SOLUTION INTRAMUSCULAR; INTRAVENOUS
Status: DISPENSED
Start: 2023-10-07

## (undated) RX ORDER — PROPOFOL 10 MG/ML
INJECTION, EMULSION INTRAVENOUS
Status: DISPENSED
Start: 2024-01-02

## (undated) RX ORDER — PROPOFOL 10 MG/ML
INJECTION, EMULSION INTRAVENOUS
Status: DISPENSED
Start: 2023-09-26

## (undated) RX ORDER — GLYCOPYRROLATE 0.2 MG/ML
INJECTION, SOLUTION INTRAMUSCULAR; INTRAVENOUS
Status: DISPENSED
Start: 2024-07-14

## (undated) RX ORDER — ONDANSETRON 2 MG/ML
INJECTION INTRAMUSCULAR; INTRAVENOUS
Status: DISPENSED
Start: 2023-09-26

## (undated) RX ORDER — CEFAZOLIN SODIUM/WATER 2 G/20 ML
SYRINGE (ML) INTRAVENOUS
Status: DISPENSED
Start: 2023-10-14

## (undated) RX ORDER — ONDANSETRON 2 MG/ML
INJECTION INTRAMUSCULAR; INTRAVENOUS
Status: DISPENSED
Start: 2023-10-07

## (undated) RX ORDER — SILVER SULFADIAZINE 10 MG/G
CREAM TOPICAL
Status: DISPENSED
Start: 2023-09-26

## (undated) RX ORDER — FENTANYL CITRATE 50 UG/ML
INJECTION, SOLUTION INTRAMUSCULAR; INTRAVENOUS
Status: DISPENSED
Start: 2023-10-14

## (undated) RX ORDER — PROPOFOL 10 MG/ML
INJECTION, EMULSION INTRAVENOUS
Status: DISPENSED
Start: 2024-11-14

## (undated) RX ORDER — PROPOFOL 10 MG/ML
INJECTION, EMULSION INTRAVENOUS
Status: DISPENSED
Start: 2023-10-14

## (undated) RX ORDER — ACETAMINOPHEN 325 MG/1
TABLET ORAL
Status: DISPENSED
Start: 2023-09-26

## (undated) RX ORDER — LIDOCAINE HYDROCHLORIDE AND EPINEPHRINE 10; 10 MG/ML; UG/ML
INJECTION, SOLUTION INFILTRATION; PERINEURAL
Status: DISPENSED
Start: 2024-01-02

## (undated) RX ORDER — CEFAZOLIN SODIUM/WATER 2 G/20 ML
SYRINGE (ML) INTRAVENOUS
Status: DISPENSED
Start: 2024-01-02

## (undated) RX ORDER — FENTANYL CITRATE 50 UG/ML
INJECTION, SOLUTION INTRAMUSCULAR; INTRAVENOUS
Status: DISPENSED
Start: 2024-11-14